# Patient Record
Sex: MALE | Race: WHITE | NOT HISPANIC OR LATINO
[De-identification: names, ages, dates, MRNs, and addresses within clinical notes are randomized per-mention and may not be internally consistent; named-entity substitution may affect disease eponyms.]

---

## 2019-12-05 ENCOUNTER — TRANSCRIPTION ENCOUNTER (OUTPATIENT)
Age: 74
End: 2019-12-05

## 2019-12-06 ENCOUNTER — INPATIENT (INPATIENT)
Facility: HOSPITAL | Age: 74
LOS: 82 days | Discharge: HOME | DRG: 853 | End: 2020-02-27
Attending: SURGERY | Admitting: SURGERY
Payer: MEDICARE

## 2019-12-06 VITALS
SYSTOLIC BLOOD PRESSURE: 75 MMHG | OXYGEN SATURATION: 96 % | TEMPERATURE: 98 F | DIASTOLIC BLOOD PRESSURE: 57 MMHG | HEART RATE: 116 BPM | RESPIRATION RATE: 32 BRPM

## 2019-12-06 DIAGNOSIS — K92.2 GASTROINTESTINAL HEMORRHAGE, UNSPECIFIED: ICD-10-CM

## 2019-12-06 DIAGNOSIS — Z98.890 OTHER SPECIFIED POSTPROCEDURAL STATES: Chronic | ICD-10-CM

## 2019-12-06 DIAGNOSIS — Z90.49 ACQUIRED ABSENCE OF OTHER SPECIFIED PARTS OF DIGESTIVE TRACT: Chronic | ICD-10-CM

## 2019-12-06 LAB
ALBUMIN SERPL ELPH-MCNC: 4.8 G/DL — SIGNIFICANT CHANGE UP (ref 3.3–5)
ALP SERPL-CCNC: 98 U/L — SIGNIFICANT CHANGE UP (ref 40–120)
ALT FLD-CCNC: 16 U/L — SIGNIFICANT CHANGE UP (ref 10–45)
ANION GAP SERPL CALC-SCNC: 18 MMOL/L — HIGH (ref 5–17)
ANION GAP SERPL CALC-SCNC: 33 MMOL/L — HIGH (ref 5–17)
APTT BLD: 30 SEC — SIGNIFICANT CHANGE UP (ref 27.5–36.3)
APTT BLD: 30.5 SEC — SIGNIFICANT CHANGE UP (ref 27.5–36.3)
AST SERPL-CCNC: 27 U/L — SIGNIFICANT CHANGE UP (ref 10–40)
BASE EXCESS BLDV CALC-SCNC: 0.4 MMOL/L — SIGNIFICANT CHANGE UP (ref -2–2)
BASOPHILS # BLD AUTO: 0.03 K/UL — SIGNIFICANT CHANGE UP (ref 0–0.2)
BASOPHILS NFR BLD AUTO: 0.1 % — SIGNIFICANT CHANGE UP (ref 0–2)
BILIRUB SERPL-MCNC: 0.6 MG/DL — SIGNIFICANT CHANGE UP (ref 0.2–1.2)
BLD GP AB SCN SERPL QL: NEGATIVE — SIGNIFICANT CHANGE UP
BLD GP AB SCN SERPL QL: NEGATIVE — SIGNIFICANT CHANGE UP
BUN SERPL-MCNC: 37 MG/DL — HIGH (ref 7–23)
BUN SERPL-MCNC: 41 MG/DL — HIGH (ref 7–23)
CA-I SERPL-SCNC: 1.08 MMOL/L — LOW (ref 1.12–1.3)
CALCIUM SERPL-MCNC: 8.3 MG/DL — LOW (ref 8.4–10.5)
CALCIUM SERPL-MCNC: 9.5 MG/DL — SIGNIFICANT CHANGE UP (ref 8.4–10.5)
CHLORIDE BLDV-SCNC: 94 MMOL/L — LOW (ref 96–108)
CHLORIDE SERPL-SCNC: 101 MMOL/L — SIGNIFICANT CHANGE UP (ref 96–108)
CHLORIDE SERPL-SCNC: 87 MMOL/L — LOW (ref 96–108)
CO2 BLDV-SCNC: 31 MMOL/L — HIGH (ref 22–30)
CO2 SERPL-SCNC: 22 MMOL/L — SIGNIFICANT CHANGE UP (ref 22–31)
CO2 SERPL-SCNC: 22 MMOL/L — SIGNIFICANT CHANGE UP (ref 22–31)
CREAT SERPL-MCNC: 2.36 MG/DL — HIGH (ref 0.5–1.3)
CREAT SERPL-MCNC: 2.74 MG/DL — HIGH (ref 0.5–1.3)
EOSINOPHIL # BLD AUTO: 0.01 K/UL — SIGNIFICANT CHANGE UP (ref 0–0.5)
EOSINOPHIL NFR BLD AUTO: 0 % — SIGNIFICANT CHANGE UP (ref 0–6)
GAS PNL BLDA: SIGNIFICANT CHANGE UP
GAS PNL BLDA: SIGNIFICANT CHANGE UP
GAS PNL BLDV: 135 MMOL/L — SIGNIFICANT CHANGE UP (ref 135–145)
GAS PNL BLDV: SIGNIFICANT CHANGE UP
GAS PNL BLDV: SIGNIFICANT CHANGE UP
GLUCOSE BLDV-MCNC: 208 MG/DL — HIGH (ref 70–99)
GLUCOSE SERPL-MCNC: 198 MG/DL — HIGH (ref 70–99)
GLUCOSE SERPL-MCNC: 75 MG/DL — SIGNIFICANT CHANGE UP (ref 70–99)
HCO3 BLDV-SCNC: 29 MMOL/L — SIGNIFICANT CHANGE UP (ref 21–29)
HCT VFR BLD CALC: 44.5 % — SIGNIFICANT CHANGE UP (ref 39–50)
HCT VFR BLD CALC: 46.2 % — SIGNIFICANT CHANGE UP (ref 39–50)
HCT VFR BLDA CALC: 50 % — SIGNIFICANT CHANGE UP (ref 39–50)
HGB BLD CALC-MCNC: 16.5 G/DL — SIGNIFICANT CHANGE UP (ref 13–17)
HGB BLD-MCNC: 15.1 G/DL — SIGNIFICANT CHANGE UP (ref 13–17)
HGB BLD-MCNC: 15.9 G/DL — SIGNIFICANT CHANGE UP (ref 13–17)
IMM GRANULOCYTES NFR BLD AUTO: 0.8 % — SIGNIFICANT CHANGE UP (ref 0–1.5)
INR BLD: 1.07 RATIO — SIGNIFICANT CHANGE UP (ref 0.88–1.16)
INR BLD: 1.21 RATIO — HIGH (ref 0.88–1.16)
LACTATE BLDV-MCNC: 9.4 MMOL/L — CRITICAL HIGH (ref 0.7–2)
LYMPHOCYTES # BLD AUTO: 1.32 K/UL — SIGNIFICANT CHANGE UP (ref 1–3.3)
LYMPHOCYTES # BLD AUTO: 5.7 % — LOW (ref 13–44)
MAGNESIUM SERPL-MCNC: 1.9 MG/DL — SIGNIFICANT CHANGE UP (ref 1.6–2.6)
MANUAL SMEAR VERIFICATION: SIGNIFICANT CHANGE UP
MCHC RBC-ENTMCNC: 30.3 PG — SIGNIFICANT CHANGE UP (ref 27–34)
MCHC RBC-ENTMCNC: 30.7 PG — SIGNIFICANT CHANGE UP (ref 27–34)
MCHC RBC-ENTMCNC: 33.9 GM/DL — SIGNIFICANT CHANGE UP (ref 32–36)
MCHC RBC-ENTMCNC: 34.4 GM/DL — SIGNIFICANT CHANGE UP (ref 32–36)
MCV RBC AUTO: 89.2 FL — SIGNIFICANT CHANGE UP (ref 80–100)
MCV RBC AUTO: 89.2 FL — SIGNIFICANT CHANGE UP (ref 80–100)
MONOCYTES # BLD AUTO: 1.06 K/UL — HIGH (ref 0–0.9)
MONOCYTES NFR BLD AUTO: 4.6 % — SIGNIFICANT CHANGE UP (ref 2–14)
NEUTROPHILS # BLD AUTO: 20.67 K/UL — HIGH (ref 1.8–7.4)
NEUTROPHILS NFR BLD AUTO: 88.8 % — HIGH (ref 43–77)
NRBC # BLD: 0 /100 WBCS — SIGNIFICANT CHANGE UP (ref 0–0)
NRBC # BLD: 0 /100 WBCS — SIGNIFICANT CHANGE UP (ref 0–0)
PCO2 BLDV: 67 MMHG — HIGH (ref 35–50)
PH BLDV: 7.27 — LOW (ref 7.35–7.45)
PHOSPHATE SERPL-MCNC: 6.2 MG/DL — HIGH (ref 2.5–4.5)
PLAT MORPH BLD: NORMAL — SIGNIFICANT CHANGE UP
PLATELET # BLD AUTO: 230 K/UL — SIGNIFICANT CHANGE UP (ref 150–400)
PLATELET # BLD AUTO: 352 K/UL — SIGNIFICANT CHANGE UP (ref 150–400)
PO2 BLDV: <20 MMHG — LOW (ref 25–45)
POTASSIUM BLDV-SCNC: 4.4 MMOL/L — SIGNIFICANT CHANGE UP (ref 3.5–5.3)
POTASSIUM SERPL-MCNC: 3.4 MMOL/L — LOW (ref 3.5–5.3)
POTASSIUM SERPL-MCNC: 3.5 MMOL/L — SIGNIFICANT CHANGE UP (ref 3.5–5.3)
POTASSIUM SERPL-SCNC: 3.4 MMOL/L — LOW (ref 3.5–5.3)
POTASSIUM SERPL-SCNC: 3.5 MMOL/L — SIGNIFICANT CHANGE UP (ref 3.5–5.3)
PROT SERPL-MCNC: 8.5 G/DL — HIGH (ref 6–8.3)
PROTHROM AB SERPL-ACNC: 12.3 SEC — SIGNIFICANT CHANGE UP (ref 10–12.9)
PROTHROM AB SERPL-ACNC: 14 SEC — HIGH (ref 10–12.9)
RBC # BLD: 4.99 M/UL — SIGNIFICANT CHANGE UP (ref 4.2–5.8)
RBC # BLD: 5.18 M/UL — SIGNIFICANT CHANGE UP (ref 4.2–5.8)
RBC # FLD: 12.5 % — SIGNIFICANT CHANGE UP (ref 10.3–14.5)
RBC # FLD: 12.9 % — SIGNIFICANT CHANGE UP (ref 10.3–14.5)
RBC BLD AUTO: NORMAL — SIGNIFICANT CHANGE UP
RH IG SCN BLD-IMP: POSITIVE — SIGNIFICANT CHANGE UP
RH IG SCN BLD-IMP: POSITIVE — SIGNIFICANT CHANGE UP
SAO2 % BLDV: 13 % — LOW (ref 67–88)
SODIUM SERPL-SCNC: 141 MMOL/L — SIGNIFICANT CHANGE UP (ref 135–145)
SODIUM SERPL-SCNC: 142 MMOL/L — SIGNIFICANT CHANGE UP (ref 135–145)
WBC # BLD: 14.16 K/UL — HIGH (ref 3.8–10.5)
WBC # BLD: 23.28 K/UL — HIGH (ref 3.8–10.5)
WBC # FLD AUTO: 14.16 K/UL — HIGH (ref 3.8–10.5)
WBC # FLD AUTO: 23.28 K/UL — HIGH (ref 3.8–10.5)

## 2019-12-06 PROCEDURE — 99291 CRITICAL CARE FIRST HOUR: CPT | Mod: GC

## 2019-12-06 PROCEDURE — 99292 CRITICAL CARE ADDL 30 MIN: CPT | Mod: GC

## 2019-12-06 PROCEDURE — 71045 X-RAY EXAM CHEST 1 VIEW: CPT | Mod: 26,76

## 2019-12-06 PROCEDURE — 99291 CRITICAL CARE FIRST HOUR: CPT

## 2019-12-06 PROCEDURE — 74176 CT ABD & PELVIS W/O CONTRAST: CPT | Mod: 26

## 2019-12-06 PROCEDURE — 99223 1ST HOSP IP/OBS HIGH 75: CPT | Mod: GC

## 2019-12-06 PROCEDURE — 74018 RADEX ABDOMEN 1 VIEW: CPT | Mod: 26

## 2019-12-06 RX ORDER — POTASSIUM CHLORIDE 20 MEQ
20 PACKET (EA) ORAL
Refills: 0 | Status: COMPLETED | OUTPATIENT
Start: 2019-12-06 | End: 2019-12-07

## 2019-12-06 RX ORDER — HEPARIN SODIUM 5000 [USP'U]/ML
5000 INJECTION INTRAVENOUS; SUBCUTANEOUS EVERY 8 HOURS
Refills: 0 | Status: DISCONTINUED | OUTPATIENT
Start: 2019-12-06 | End: 2019-12-06

## 2019-12-06 RX ORDER — CALCIUM GLUCONATE 100 MG/ML
2 VIAL (ML) INTRAVENOUS ONCE
Refills: 0 | Status: DISCONTINUED | OUTPATIENT
Start: 2019-12-06 | End: 2019-12-06

## 2019-12-06 RX ORDER — CHLORHEXIDINE GLUCONATE 213 G/1000ML
1 SOLUTION TOPICAL
Refills: 0 | Status: DISCONTINUED | OUTPATIENT
Start: 2019-12-06 | End: 2019-12-08

## 2019-12-06 RX ORDER — MAGNESIUM SULFATE 500 MG/ML
2 VIAL (ML) INJECTION ONCE
Refills: 0 | Status: COMPLETED | OUTPATIENT
Start: 2019-12-06 | End: 2019-12-06

## 2019-12-06 RX ORDER — SODIUM CHLORIDE 9 MG/ML
1000 INJECTION INTRAMUSCULAR; INTRAVENOUS; SUBCUTANEOUS
Refills: 0 | Status: DISCONTINUED | OUTPATIENT
Start: 2019-12-06 | End: 2019-12-06

## 2019-12-06 RX ORDER — ONDANSETRON 8 MG/1
4 TABLET, FILM COATED ORAL ONCE
Refills: 0 | Status: COMPLETED | OUTPATIENT
Start: 2019-12-06 | End: 2019-12-06

## 2019-12-06 RX ORDER — VASOPRESSIN 20 [USP'U]/ML
0.03 INJECTION INTRAVENOUS
Qty: 50 | Refills: 0 | Status: DISCONTINUED | OUTPATIENT
Start: 2019-12-06 | End: 2019-12-08

## 2019-12-06 RX ORDER — CHLORHEXIDINE GLUCONATE 213 G/1000ML
15 SOLUTION TOPICAL
Refills: 0 | Status: DISCONTINUED | OUTPATIENT
Start: 2019-12-06 | End: 2019-12-08

## 2019-12-06 RX ORDER — SODIUM CHLORIDE 9 MG/ML
1000 INJECTION INTRAMUSCULAR; INTRAVENOUS; SUBCUTANEOUS ONCE
Refills: 0 | Status: DISCONTINUED | OUTPATIENT
Start: 2019-12-06 | End: 2019-12-06

## 2019-12-06 RX ORDER — IPRATROPIUM/ALBUTEROL SULFATE 18-103MCG
3 AEROSOL WITH ADAPTER (GRAM) INHALATION EVERY 6 HOURS
Refills: 0 | Status: DISCONTINUED | OUTPATIENT
Start: 2019-12-06 | End: 2019-12-08

## 2019-12-06 RX ORDER — PIPERACILLIN AND TAZOBACTAM 4; .5 G/20ML; G/20ML
3.38 INJECTION, POWDER, LYOPHILIZED, FOR SOLUTION INTRAVENOUS EVERY 12 HOURS
Refills: 0 | Status: DISCONTINUED | OUTPATIENT
Start: 2019-12-07 | End: 2019-12-08

## 2019-12-06 RX ORDER — SODIUM CHLORIDE 9 MG/ML
1000 INJECTION, SOLUTION INTRAVENOUS
Refills: 0 | Status: DISCONTINUED | OUTPATIENT
Start: 2019-12-06 | End: 2019-12-06

## 2019-12-06 RX ORDER — SODIUM CHLORIDE 9 MG/ML
1000 INJECTION INTRAMUSCULAR; INTRAVENOUS; SUBCUTANEOUS ONCE
Refills: 0 | Status: COMPLETED | OUTPATIENT
Start: 2019-12-06 | End: 2019-12-06

## 2019-12-06 RX ORDER — SODIUM CHLORIDE 9 MG/ML
500 INJECTION INTRAMUSCULAR; INTRAVENOUS; SUBCUTANEOUS ONCE
Refills: 0 | Status: DISCONTINUED | OUTPATIENT
Start: 2019-12-06 | End: 2019-12-06

## 2019-12-06 RX ORDER — CEFTRIAXONE 500 MG/1
2000 INJECTION, POWDER, FOR SOLUTION INTRAMUSCULAR; INTRAVENOUS ONCE
Refills: 0 | Status: COMPLETED | OUTPATIENT
Start: 2019-12-06 | End: 2019-12-06

## 2019-12-06 RX ORDER — FENTANYL CITRATE 50 UG/ML
1 INJECTION INTRAVENOUS
Qty: 5000 | Refills: 0 | Status: DISCONTINUED | OUTPATIENT
Start: 2019-12-06 | End: 2019-12-08

## 2019-12-06 RX ORDER — OCTREOTIDE ACETATE 200 UG/ML
50 INJECTION, SOLUTION INTRAVENOUS; SUBCUTANEOUS
Qty: 500 | Refills: 0 | Status: DISCONTINUED | OUTPATIENT
Start: 2019-12-06 | End: 2019-12-06

## 2019-12-06 RX ORDER — SODIUM CHLORIDE 9 MG/ML
1000 INJECTION, SOLUTION INTRAVENOUS
Refills: 0 | Status: DISCONTINUED | OUTPATIENT
Start: 2019-12-06 | End: 2019-12-07

## 2019-12-06 RX ORDER — SODIUM CHLORIDE 9 MG/ML
1000 INJECTION, SOLUTION INTRAVENOUS ONCE
Refills: 0 | Status: COMPLETED | OUTPATIENT
Start: 2019-12-06 | End: 2019-12-06

## 2019-12-06 RX ORDER — PANTOPRAZOLE SODIUM 20 MG/1
40 TABLET, DELAYED RELEASE ORAL EVERY 12 HOURS
Refills: 0 | Status: DISCONTINUED | OUTPATIENT
Start: 2019-12-06 | End: 2019-12-08

## 2019-12-06 RX ORDER — DEXMEDETOMIDINE HYDROCHLORIDE IN 0.9% SODIUM CHLORIDE 4 UG/ML
0.2 INJECTION INTRAVENOUS
Qty: 200 | Refills: 0 | Status: DISCONTINUED | OUTPATIENT
Start: 2019-12-06 | End: 2019-12-08

## 2019-12-06 RX ORDER — METOCLOPRAMIDE HCL 10 MG
10 TABLET ORAL ONCE
Refills: 0 | Status: COMPLETED | OUTPATIENT
Start: 2019-12-06 | End: 2019-12-06

## 2019-12-06 RX ORDER — PANTOPRAZOLE SODIUM 20 MG/1
40 TABLET, DELAYED RELEASE ORAL EVERY 24 HOURS
Refills: 0 | Status: DISCONTINUED | OUTPATIENT
Start: 2019-12-06 | End: 2019-12-06

## 2019-12-06 RX ORDER — SODIUM CHLORIDE 9 MG/ML
1500 INJECTION INTRAMUSCULAR; INTRAVENOUS; SUBCUTANEOUS ONCE
Refills: 0 | Status: DISCONTINUED | OUTPATIENT
Start: 2019-12-06 | End: 2019-12-06

## 2019-12-06 RX ORDER — PANTOPRAZOLE SODIUM 20 MG/1
80 TABLET, DELAYED RELEASE ORAL ONCE
Refills: 0 | Status: COMPLETED | OUTPATIENT
Start: 2019-12-06 | End: 2019-12-06

## 2019-12-06 RX ORDER — OCTREOTIDE ACETATE 200 UG/ML
50 INJECTION, SOLUTION INTRAVENOUS; SUBCUTANEOUS ONCE
Refills: 0 | Status: COMPLETED | OUTPATIENT
Start: 2019-12-06 | End: 2019-12-06

## 2019-12-06 RX ORDER — PANTOPRAZOLE SODIUM 20 MG/1
8 TABLET, DELAYED RELEASE ORAL
Qty: 80 | Refills: 0 | Status: DISCONTINUED | OUTPATIENT
Start: 2019-12-06 | End: 2019-12-06

## 2019-12-06 RX ORDER — PIPERACILLIN AND TAZOBACTAM 4; .5 G/20ML; G/20ML
3.38 INJECTION, POWDER, LYOPHILIZED, FOR SOLUTION INTRAVENOUS ONCE
Refills: 0 | Status: COMPLETED | OUTPATIENT
Start: 2019-12-06 | End: 2019-12-06

## 2019-12-06 RX ORDER — ENOXAPARIN SODIUM 100 MG/ML
30 INJECTION SUBCUTANEOUS EVERY 24 HOURS
Refills: 0 | Status: DISCONTINUED | OUTPATIENT
Start: 2019-12-06 | End: 2019-12-08

## 2019-12-06 RX ADMIN — Medication 50 MILLIEQUIVALENT(S): at 23:50

## 2019-12-06 RX ADMIN — OCTREOTIDE ACETATE 50 MICROGRAM(S): 200 INJECTION, SOLUTION INTRAVENOUS; SUBCUTANEOUS at 16:27

## 2019-12-06 RX ADMIN — SODIUM CHLORIDE 1000 MILLILITER(S): 9 INJECTION INTRAMUSCULAR; INTRAVENOUS; SUBCUTANEOUS at 16:58

## 2019-12-06 RX ADMIN — ONDANSETRON 4 MILLIGRAM(S): 8 TABLET, FILM COATED ORAL at 15:39

## 2019-12-06 RX ADMIN — Medication 10 MILLIGRAM(S): at 18:03

## 2019-12-06 RX ADMIN — DEXMEDETOMIDINE HYDROCHLORIDE IN 0.9% SODIUM CHLORIDE 2.71 MICROGRAM(S)/KG/HR: 4 INJECTION INTRAVENOUS at 23:23

## 2019-12-06 RX ADMIN — PIPERACILLIN AND TAZOBACTAM 200 GRAM(S): 4; .5 INJECTION, POWDER, LYOPHILIZED, FOR SOLUTION INTRAVENOUS at 18:08

## 2019-12-06 RX ADMIN — FENTANYL CITRATE 2.71 MICROGRAM(S)/KG/HR: 50 INJECTION INTRAVENOUS at 23:21

## 2019-12-06 RX ADMIN — CEFTRIAXONE 100 MILLIGRAM(S): 500 INJECTION, POWDER, FOR SOLUTION INTRAMUSCULAR; INTRAVENOUS at 16:26

## 2019-12-06 RX ADMIN — SODIUM CHLORIDE 4000 MILLILITER(S): 9 INJECTION INTRAMUSCULAR; INTRAVENOUS; SUBCUTANEOUS at 23:22

## 2019-12-06 RX ADMIN — PANTOPRAZOLE SODIUM 10 MG/HR: 20 TABLET, DELAYED RELEASE ORAL at 16:55

## 2019-12-06 RX ADMIN — SODIUM CHLORIDE 2000 MILLILITER(S): 9 INJECTION, SOLUTION INTRAVENOUS at 17:56

## 2019-12-06 RX ADMIN — VASOPRESSIN 1.8 UNIT(S)/MIN: 20 INJECTION INTRAVENOUS at 23:23

## 2019-12-06 RX ADMIN — PANTOPRAZOLE SODIUM 80 MILLIGRAM(S): 20 TABLET, DELAYED RELEASE ORAL at 15:38

## 2019-12-06 RX ADMIN — Medication 50 GRAM(S): at 23:49

## 2019-12-06 RX ADMIN — OCTREOTIDE ACETATE 10 MICROGRAM(S)/HR: 200 INJECTION, SOLUTION INTRAVENOUS; SUBCUTANEOUS at 16:58

## 2019-12-06 RX ADMIN — ONDANSETRON 4 MILLIGRAM(S): 8 TABLET, FILM COATED ORAL at 15:47

## 2019-12-06 NOTE — CONSULT NOTE ADULT - ASSESSMENT
75 y/o M presenting with septic shock and high grade SBO s/p exploratory laparotomy, lysis of adhesions, decompression of bowel via enterotomy w/ primary repair, and Abthera VAC placement.    PLAN:    Neuro:    Resp:    CV:    GI:    Renal:    Heme:    ID:    Endo:    Disposition:  - Full code  - [facility]    Janelle Bah PA-C     [id number] 73 y/o M presenting with septic shock and high grade SBO s/p exploratory laparotomy, lysis of adhesions, decompression of bowel via enterotomy w/ primary repair, and Abthera VAC placement on 12/6    PLAN:    Neuro: acute pain  - Precedex for sedation while intubated  - Fentanyl drip for analgesia    Resp: acute respiratory failure  - Continue mechanical ventilatory support  - Trend ABG and lactate q4 hours  - Daily CXR while intubated  - Duonebs    CV: septic shock requiring vasopressor support  - Wean Levophed as tolerated for MAP >65  - Vasopressin at 0.03 units/min  - Naeem Trac, CVP, trend lactate  - Critical care echocardiography and fluid resuscitation    GI: SBO s/p exploratory laparotomy, lysis of adhesions, decompression of bowel via enterotomy w/ primary repair, and Abthera VAC placement  - NPO/ NGT  - Protonix for stress ulcer prophylaxis    Renal: CHI   - NS @ 75 ml/hr  - Martinez, strict I&Os    Heme: no acute issues  - Lovenox for VTE prophylaxis  - Venodynes    ID: septic shock 2/2 intra abdominal infection  - Continue Zosyn    Endo: no acute issues  - Monitor glucose on VBMP    Disposition:  - Full code  - Will admit to SICU    Janelle Bah PA-C     [id number]

## 2019-12-06 NOTE — ED ADULT NURSE NOTE - NSIMPLEMENTINTERV_GEN_ALL_ED
Implemented All Fall with Harm Risk Interventions:  Sells to call system. Call bell, personal items and telephone within reach. Instruct patient to call for assistance. Room bathroom lighting operational. Non-slip footwear when patient is off stretcher. Physically safe environment: no spills, clutter or unnecessary equipment. Stretcher in lowest position, wheels locked, appropriate side rails in place. Provide visual cue, wrist band, yellow gown, etc. Monitor gait and stability. Monitor for mental status changes and reorient to person, place, and time. Review medications for side effects contributing to fall risk. Reinforce activity limits and safety measures with patient and family. Provide visual clues: red socks.

## 2019-12-06 NOTE — H&P ADULT - ASSESSMENT
75 y/o with PMHx of COPD and ETOH abuse presenting with high grade SBO and peritoneal signs    - admit to Dr. TING siegel   - f/u  CXR for NGT placement   - NPO  -monitor NGT output  - IVF  - OR for emergent ex. lap     patient seen and examined with attending Dr. Siegel     [facility] [phone]

## 2019-12-06 NOTE — ED PROVIDER NOTE - ATTENDING CONTRIBUTION TO CARE
I, Estrada Martinez, performed a history and physical exam of the patient and discussed their management with the resident and /or advanced care provider. I reviewed the resident and /or ACP's note and agree with the documented findings and plan of care. I was present and available for all procedures.  Patient with upper GI bleed and tense abdomen.  GI called immediately along with 2 units pRBC considering hypotension and recent GI bleed.  Will also evaluate CT and discuss with surgery considering abdominal exam.  Patient improved in ED for CT evaluation.  Patient signed-out to Dr. Aguirre to f/u surgery consult, CT, and GI recommendations.

## 2019-12-06 NOTE — CONSULT NOTE ADULT - ASSESSMENT
Impression:  # Abdominal pain/vomiting: Concern for perforated abdomen. Pt clinically hypotensive, septic, lactate of 10 and without BM for 3 days. Pt possible having vomiting with kartik parikh tear vs perforation as well. Currently appears resuscitated but concern for sepsis given leukocytosis and clinical findings.  # COPD  # Prior alcohol use: liver enzymes wnl, platelets wnl, not concerning for cirrhosis or varices    Recommendation:  - check state CT Abdomen/pelvis  - surgical consult stat for possible OR given clinical findings  - monitor CBC, CMP, INR  - trend lactate  - can trial PPI IV BID  - no role for endoscopy given high risk with concern for perforation and EGD would be contraindicated as it can worsen perforation  - supportive care, consider ICU consult Impression:  # Abdominal pain/vomiting: Concern for intestinal obstruction vs perforated abdomen. Pt clinically hypotensive, septic, lactate of 10 and without BM for 3 days. Pt possible having vomiting with kartik parikh tear vs perforation as well. Currently appears resuscitated but concern for sepsis given leukocytosis and clinical findings.  # COPD  # Prior alcohol use: liver enzymes wnl, platelets wnl, not concerning for cirrhosis or varices    Recommendation:  - check state CT Abdomen/pelvis  - Place NGT to suction  - surgical consult stat for possible OR given clinical findings  - Would start board spectrum antibiotics pending imaging  - monitor CBC, CMP, INR  - trend lactate  - can trial PPI IV BID  - no role for endoscopy given high risk with concern for perforation and EGD would be contraindicated as it can worsen perforation  - supportive care, consider ICU consult

## 2019-12-06 NOTE — ED PROVIDER NOTE - CLINICAL SUMMARY MEDICAL DECISION MAKING FREE TEXT BOX
73yo M h/o COPD, former alcoholic (has not seen doctor in years) p/w coffee ground emesis since yesterday. ill appearing on exam, tachy, tachypneic, hypoxic 2/2 likely variceal bleed given pt's h/o former alcohol abuse. will give emergent blood, ceftriaxone, protonix, labs, GI consult, admission. 75yo M h/o COPD, former alcoholic (has not seen doctor in years) p/w coffee ground emesis since yesterday. ill appearing on exam, tachy, tachypneic, hypoxic 2/2 likely variceal bleed given pt's h/o former alcohol abuse. pt with rigid abdomen, concern for acute abdomen. will c/s surgery. will give emergent blood, ceftriaxone, protonix, labs, GI consult, admission.

## 2019-12-06 NOTE — CONSULT NOTE ADULT - SUBJECTIVE AND OBJECTIVE BOX
HISTORY OF PRESENT ILLNESS:  74 year old male with a past medical history of COPD and EtOH dependence who presented today with abdominal pain, N/V, and poor PO intake for ~2-3 days. Imaging revealed high grade SBO in the RLQ. Labs significant for an CHI w/ Cr 2.74 and lactate of 9.4. He was also notably tachycardic to the 110s and hypotensive w/ SBP in the 70s. He was given 2 L of LR in the ED. Patient was taken to the OR emergently for an exploratory laparotomy HISTORY OF PRESENT ILLNESS:  74 year old male with a past medical history of COPD and EtOH dependence who presented today with abdominal pain, nausea, hematemesis, and poor PO intake for ~2-3 days. Labs significant for an CHI w/ Cr 2.74 and lactate of 9.4. He was also notably tachycardic to the 110s and hypotensive w/ SBP in the 70s. He was given 2 units of PRBCs and 2 L of LR in the ED due to concern for upper GI bleeding. Imaging revealed high grade SBO in the RLQ. Patient was taken to the OR emergently for an exploratory laparotomy, lysis of adhesions, decompression of bowel via enterotomy w/ primary repair, and Abthera VAC placement. Of note, the distal 50% of the bowel appeared dusky but viable. He required vasopressor support with phenylephrine and vasopressin infusions. He received 3000 mL of crystalloid w/ EBL of 10 mL and UOP of 25 mL. Patient was left intubated at the end of the case so SICU was consulted for hemodynamic monitoring.    PAST MEDICAL HISTORY:  - COPD with hypoxia  - ETOH abuse    PAST SURGICAL HISTORY:  - History of lumbosacral spine surgery  - History of prostate surgery  - History of appendectomy    HOME MEDICATIONS: None    ALLERGIES: No Known Allergies    FAMILY HISTORY: Non-contributory    SOCIAL HISTORY: Former EtOH abuse w/ last drink ~3 years ago, current smoker of 1 PPD w/ 30 pack-years, denies illicit substance use    CODE STATUS: Full code    VITAL SIGNS:  T(C): 37.1 (07 Dec 2019 01:00), Max: 37.1 (07 Dec 2019 01:00)  T(F): 98.8 (07 Dec 2019 01:00), Max: 98.8 (07 Dec 2019 01:00)  HR: 104 (07 Dec 2019 02:15) (93 - 121)  BP: 82/54 (07 Dec 2019 00:15) (75/57 - 142/79)  BP(mean): 63 (07 Dec 2019 00:15) (63 - 104)  ABP: 100/55 (07 Dec 2019 02:15) (75/51 - 150/77)  ABP(mean): 71 (07 Dec 2019 02:15) (61 - 107)  RR: 20 (06 Dec 2019 22:30) (15 - 32)  SpO2: 100% (07 Dec 2019 02:15) (93% - 100%)    PHYSICAL EXAMINATION:  General - cachectic, no acute distress  Neuro - sedated, easily arousable, follows commands, no focal deficits  HEENT - normocephalic, PERRL, EOMI, moist mucous membranes  Lungs - clear to auscultation bilaterally  Heart - regular rhythm, tachycardic  Abdomen - soft, nondistended, sawyer-incisional tenderness, Abthera VAC to suction w/ good seal and moderate serosanguineous output   - Martinez to gravity draining clear & yellow urine  Extremities - all four extremities are warm & pink with 2+ pulses, strength 5/5, intact sensation    LABS:                        15.1   14.16 )-----------( 230      ( 06 Dec 2019 22:27 )             44.5     141  |  101  |  41<H>  ----------------------------<  75  3.4<L>   |  22  |  2.36<H>    Ca    8.3<L>      06 Dec 2019 22:27  Phos  6.2  Mg     1.9  TPro  8.5<H>  /  Alb  4.8  /  TBili  0.6  /  DBili  x   /  AST  27  /  ALT  16  /  AlkPhos  98    PT/INR - ( 06 Dec 2019 22:27 )   PT: 14.0 sec;   INR: 1.21 ratio    PTT - ( 06 Dec 2019 22:27 )  PTT:30.5 sec    ABG - ( 06 Dec 2019 22:14 )  pH, Arterial: 7.30    /  pCO2: 52    /  pO2: 334   / HCO3: 25    / Base Excess: -2.0  /  SaO2: 100   /   Lactate: 2.7    IMAGING:  < from: CT Abdomen and Pelvis No Cont (12.06.19 @ 16:48) >  FINDINGS:  - LOWER CHEST: Within normal limits.  - LIVER: Within normal limits.  - BILE DUCTS: Normal caliber.  - GALLBLADDER: Within normal limits.  - SPLEEN: Within normal limits.  - PANCREAS: Within normal limits.  - ADRENALS: Within normal limits.  - KIDNEYS/URETERS: No hydronephrosis. Nonobstructing bilateral renal calculi.  - BLADDER: Within normal limits.  - REPRODUCTIVE ORGANS: Prostatectomy.  - BOWEL: High-grade small bowel obstruction with transition point in the right lower abdomen (series 602, image 34).  - PERITONEUM: No ascites.  - VESSELS: Atherosclerotic changes.  - RETROPERITONEUM/LYMPH NODES: No lymphadenopathy.  - ABDOMINAL WALL: Within normal limits.  - BONES: Degenerative changes.  IMPRESSION:  - High-grade small bowel obstruction with transition point in the right lower quadrant. HISTORY OF PRESENT ILLNESS:  74 year old male with a past medical history of COPD and EtOH dependence who presented today with abdominal pain, nausea, hematemesis, and poor PO intake for ~2-3 days. Labs significant for an CHI w/ Cr 2.74 and lactate of 9.4. He was also notably tachycardic to the 110s and hypotensive w/ SBP in the 70s. He was given 2 units of PRBCs and 2 L of LR in the ED due to concern for upper GI bleeding. Imaging revealed high grade SBO in the RLQ. Patient was taken to the OR emergently for an exploratory laparotomy, lysis of adhesions, decompression of bowel via enterotomy w/ primary repair, and Abthera VAC placement. Of note, the distal 50% of the bowel appeared dusky but viable. He required vasopressor support with phenylephrine and vasopressin infusions. He received 3000 mL of crystalloid w/ EBL of 10 mL and UOP of 25 mL. Patient was left intubated at the end of the case so SICU was consulted for hemodynamic monitoring.    Of note, patient does not seem to have sought medical care in some time.    Unable to obtain HPI, history, or ROS from patient as he is sedated and intubated. History obtained from chart.    PAST MEDICAL HISTORY:  - COPD with hypoxia  - ETOH abuse (sober for 3 years)    PAST SURGICAL HISTORY:  - History of lumbosacral spine surgery  - History of prostate surgery  - History of appendectomy    HOME MEDICATIONS: None    ALLERGIES: No Known Allergies    FAMILY HISTORY: Non-contributory for small bowel obstruction    SOCIAL HISTORY: Former EtOH abuse w/ last drink ~3 years ago, current smoker of 1 PPD w/ 30 pack-years, denies illicit substance use    CODE STATUS: Full code    VITAL SIGNS:  T(C): 37.1 (07 Dec 2019 01:00), Max: 37.1 (07 Dec 2019 01:00)  T(F): 98.8 (07 Dec 2019 01:00), Max: 98.8 (07 Dec 2019 01:00)  HR: 104 (07 Dec 2019 02:15) (93 - 121)  BP: 82/54 (07 Dec 2019 00:15) (75/57 - 142/79)  BP(mean): 63 (07 Dec 2019 00:15) (63 - 104)  ABP: 100/55 (07 Dec 2019 02:15) (75/51 - 150/77)  ABP(mean): 71 (07 Dec 2019 02:15) (61 - 107)  RR: 20 (06 Dec 2019 22:30) (15 - 32)  SpO2: 100% (07 Dec 2019 02:15) (93% - 100%)    PHYSICAL EXAMINATION:  General - cachectic, no acute distress  Neuro - sedated, easily arousable, follows commands, no focal deficits  HEENT - normocephalic, PERRL, EOMI, moist mucous membranes  Lungs - clear to auscultation bilaterally  Heart - regular rhythm, tachycardic  Abdomen - soft, nondistended, sawyer-incisional tenderness, Abthera VAC to suction w/ good seal and moderate serosanguineous output   - Martinez to gravity draining clear & yellow urine  Extremities - all four extremities are warm & pink with 2+ pulses, strength 5/5, intact sensation    LABS:                        15.1   14.16 )-----------( 230      ( 06 Dec 2019 22:27 )             44.5     141  |  101  |  41<H>  ----------------------------<  75  3.4<L>   |  22  |  2.36<H>    Ca    8.3<L>      06 Dec 2019 22:27  Phos  6.2  Mg     1.9  TPro  8.5<H>  /  Alb  4.8  /  TBili  0.6  /  DBili  x   /  AST  27  /  ALT  16  /  AlkPhos  98    PT/INR - ( 06 Dec 2019 22:27 )   PT: 14.0 sec;   INR: 1.21 ratio    PTT - ( 06 Dec 2019 22:27 )  PTT:30.5 sec    ABG - ( 06 Dec 2019 22:14 )  pH, Arterial: 7.30    /  pCO2: 52    /  pO2: 334   / HCO3: 25    / Base Excess: -2.0  /  SaO2: 100   /   Lactate: 2.7    IMAGING:  < from: CT Abdomen and Pelvis No Cont (12.06.19 @ 16:48) >  FINDINGS:  - LOWER CHEST: Within normal limits.  - LIVER: Within normal limits.  - BILE DUCTS: Normal caliber.  - GALLBLADDER: Within normal limits.  - SPLEEN: Within normal limits.  - PANCREAS: Within normal limits.  - ADRENALS: Within normal limits.  - KIDNEYS/URETERS: No hydronephrosis. Nonobstructing bilateral renal calculi.  - BLADDER: Within normal limits.  - REPRODUCTIVE ORGANS: Prostatectomy.  - BOWEL: High-grade small bowel obstruction with transition point in the right lower abdomen (series 602, image 34).  - PERITONEUM: No ascites.  - VESSELS: Atherosclerotic changes.  - RETROPERITONEUM/LYMPH NODES: No lymphadenopathy.  - ABDOMINAL WALL: Within normal limits.  - BONES: Degenerative changes.  IMPRESSION:  - High-grade small bowel obstruction with transition point in the right lower quadrant.

## 2019-12-06 NOTE — CONSULT NOTE ADULT - ATTENDING COMMENTS
Patient seen and examined. Agree with above. Patient's abdomen is rigid and distended. With a high lactate and WBC, concerning for intestinal obstruction/perforation. Would obtain surgical evaluation. Start board spectrum antibiotics and start fluid resuscitation. There is no signs of active GI bleeding, would not pursue endoscopic evaluation at this time. Give PPI daily.

## 2019-12-06 NOTE — H&P ADULT - NSHPPHYSICALEXAM_GEN_ALL_CORE
Physical Exam  T(C): 36.6  HR: 121 (116 - 121)  BP: 121/78 (75/57 - 121/78)  RR: 29 (29 - 32)  SpO2: 100% (96% - 100%)  Tmax: T(C): , Max: 36.6 (12-06-19 @ 16:00)    General: well developed, well nourished, NAD  Neuro: alert and oriented, no focal deficits, moves all extremities spontaneously  HEENT: NCAT, EOMI, anicteric, mucosa moist  Respiratory: airway patent, respirations unlabored  CVS: regular rate and rhythm  Abdomen: rigid, diffusely TTP, distended, +guarding   Extremities: no edema, sensation and movement grossly intact  Skin: warm, dry, appropriate color

## 2019-12-06 NOTE — H&P ADULT - NSHPLABSRESULTS_GEN_ALL_CORE
Labs:                        15.9   23.28 )-----------( 352      ( 06 Dec 2019 15:29 )             46.2     PT/INR - ( 06 Dec 2019 16:46 )   PT: 12.3 sec;   INR: 1.07 ratio         PTT - ( 06 Dec 2019 16:46 )  PTT:30.0 sec  12-06    142  |  87<L>  |  37<H>  ----------------------------<  198<H>  3.5   |  22  |  2.74<H>    Ca    9.5      06 Dec 2019 15:29    TPro  8.5<H>  /  Alb  4.8  /  TBili  0.6  /  DBili  x   /  AST  27  /  ALT  16  /  AlkPhos  98  12-06            Imaging and other studies:  < from: CT Abdomen and Pelvis No Cont (12.06.19 @ 16:48) >      PROCEDURE:   CT of the Abdomen and Pelvis was performed without intravenous contrast.   Intravenous contrast: None.  Oral contrast: None.  Sagittal and coronal reformats were performed.    FINDINGS:    LOWER CHEST: Within normal limits.    LIVER: Within normal limits.  BILE DUCTS: Normal caliber.  GALLBLADDER: Within normal limits.  SPLEEN: Within normal limits.  PANCREAS: Within normal limits.  ADRENALS: Within normal limits.  KIDNEYS/URETERS: No hydronephrosis. Nonobstructing bilateral renal   calculi.    BLADDER: Within normal limits.  REPRODUCTIVE ORGANS: Prostatectomy.    BOWEL: High-grade small bowel obstruction with transition point in the   right lower abdomen (series 602, image 34).  PERITONEUM: No ascites.  VESSELS: Atherosclerotic changes.  RETROPERITONEUM/LYMPH NODES: No lymphadenopathy.  ABDOMINAL WALL: Within normal limits.  BONES: Degenerative changes.    IMPRESSION:     High-grade small bowel obstruction with transition point in the right   lower quadrant.

## 2019-12-06 NOTE — ED PROVIDER NOTE - NS ED ROS FT
Gen: +weak,  No fever  Eyes: No eye irritation or discharge  ENT: No earpain, congestion, sore throat  Resp: No cough or trouble breathing  Cardiovascular: No chest pain or palpitation  Gastroenteric: +coffee ground emesis  : No dysuria  MS: No joint or muscle pain  Skin: No rashes  Neuro: No headache  Remainder negative, except as per the HPI

## 2019-12-06 NOTE — ED PROVIDER NOTE - PHYSICAL EXAMINATION
Vitals: tachy, hypotensive, tachypneic  Gen: pale and weak appearing, frail  Head: NCAT  ENT: sclerae white, anicterus, dry mucous membranes. coffee ground emesis stained on mouth  CV: RRR. Audible S1 and S2.   Resp: poor respiratory effort  Abd: soft, normoactive BS x4, NTND, no rebound, no guarding, no rashes  Musculoskeletal:  No peripheral edema  Skin: no lesions or scars noted  Neurologic: AAOx3  : no CVA tenderness  Psych: normal affect Vitals: tachy, hypotensive, tachypneic  Gen: pale and weak appearing, frail  Head: NCAT  ENT: sclerae white, anicterus, dry mucous membranes. coffee ground emesis stained on mouth  CV: RRR. Audible S1 and S2.   Resp: poor respiratory effort  Abd: rigid abdomen, generalized ttp  Musculoskeletal:  No peripheral edema  Skin: no lesions or scars noted  Neurologic: AAOx3  : no CVA tenderness  Psych: normal affect

## 2019-12-06 NOTE — ED ADULT NURSE NOTE - OBJECTIVE STATEMENT
Pt BIBA from home c/o weakness since yesterday, 2 episodes of syncope and coffee ground emesis since today.  Pt with distant history of alcohol abuse (last drink 7 years ago), denies any hx of GIB, no blood thinners or NSAID use.  Pt is vomiting moderate amount of coffee ground emesis, hypotensive to 70s systolic and tachycardic to 120s. Pt BIBA from home c/o weakness since yesterday, 2 episodes of syncope and coffee ground emesis since today.  Pt with distant history of alcohol abuse (last drink 7 years ago), denies any hx of GIB, no blood thinners or NSAID use.  Pt is vomiting moderate amount of coffee ground emesis, hypotensive to 70s systolic and tachycardic to 120s, reports 2 episodes of syncope (near syncope?) today related to feeling weak and lightheaded, denies hitting his head, hx of COPD but feels that he is more SOB than usual, abd rigid, diffusely ttp, denies cp, black stool, fevers, urinary symptoms.  Was trying to drink water and ginger ale today but could not keep it down.  Current smoker.

## 2019-12-06 NOTE — CONSULT NOTE ADULT - ATTENDING COMMENTS
Pt seen and examined on admission to SICU from OR with residents and PA, agree with above. Chart reviewed, including old records from 2013 admission for EtOH detox, at which time his creatinine was 0.7.    1. High-grade adhesive SBO presenting with likely CHI (see below) and lactic acidosis, with hematemesis and abdominal pain, s/p ex lap, lysis of adhesion, decompression of very dilated small bowel through enterotomy, repair of enterotomy, with finding of distal 50% small bowel duskiness without necrosis or perforation:  - Labs reviewed  - CT report reviewed  - ABThera in place, plan to return to OR on Sunday  - D/w Chief Resident  - Will keep intubated, check CXR in AM  - CXR postop image personally reviewed: TLC, ETT, and NGT in place, lungs clear  - Monitor lactate until clearance  - If acutely decompensates, will need to return to OR sooner  - Check CVP, SVV    2. CHI versus CKD 3 (or combination):  - Last creatinine in [place] from 2013, was 0.7  - Unclear whether patient has since developed CKD or whether this is CHI, although given acute illness with lactic acidosis and hypotension, favor CHI. Unable to determine stage 1 or 2 since baseline Cr unknown.  - Will attempt to find out if patient has seen any other physicians in the past few years for clarification  - Avoid nephrotoxic medications  - Hydration  - Monitor creatinine    3. Respiratory acidosis:  - Minute ventilation increased by increasing respiratory rate  - Repeat ABG improved, maintain vent settings  - Daily CXR    Critical care time: 65 minutes Pt seen and examined on admission to SICU from OR with residents and PA, agree with above. Chart reviewed, including old records from 2013 admission for EtOH detox, at which time his creatinine was 0.7.    1. High-grade adhesive SBO presenting with likely HCI (see below) and lactic acidosis, with hematemesis and abdominal pain, s/p ex lap, lysis of adhesion, decompression of very dilated small bowel through enterotomy, repair of enterotomy, with finding of distal 50% small bowel duskiness without necrosis or perforation; on and off pressors (likely mixed distributive and hypovolemic shock):  - Labs reviewed  - CT report reviewed  - ABThera in place, plan to return to OR on Sunday  - D/w Chief Resident  - Will keep intubated, check CXR in AM  - CXR postop image personally reviewed: TLC, ETT, and NGT in place, lungs clear  - Monitor lactate until clearance  - If acutely decompensates, will need to return to OR sooner  - Check CVP, SVV    2. CHI versus CKD 3 (or combination):  - Last creatinine in [place] from 2013, was 0.7  - Unclear whether patient has since developed CKD or whether this is CHI, although given acute illness with lactic acidosis and hypotension, favor CHI. Unable to determine stage 1 or 2 since baseline Cr unknown.  - Will attempt to find out if patient has seen any other physicians in the past few years for clarification  - Avoid nephrotoxic medications  - Hydration  - Monitor creatinine    3. Respiratory acidosis:  - Minute ventilation increased by increasing respiratory rate  - Repeat ABG improved, maintain vent settings  - Daily CXR    Critical care time: 65 minutes

## 2019-12-06 NOTE — CONSULT NOTE ADULT - SUBJECTIVE AND OBJECTIVE BOX
Chief Complaint:  Patient is a 74y old  Male who presents with a chief complaint of     HPI:    Allergies:  No Known Allergies      Home Medications:    Hospital Medications:  cefTRIAXone   IVPB 2000 milliGRAM(s) IV Intermittent Once  octreotide  Infusion 50 MICROgram(s)/Hr IV Continuous <Continuous>  octreotide  Injectable 50 MICROGram(s) IV Push Once  pantoprazole Infusion 8 mG/Hr IV Continuous <Continuous>  sodium chloride 0.9% Bolus 500 milliLiter(s) IV Bolus once      PMHX/PSHX:  ETOHism  ETOH abuse      Family history:      Social History:     ROS:     General:  No wt loss, fevers, chills, night sweats, fatigue,   Eyes:  Good vision, no reported pain  ENT:  No sore throat, pain, runny nose, dysphagia  CV:  No pain, palpitations, hypo/hypertension  Resp:  No dyspnea, cough, tachypnea, wheezing  GI:  See HPI  :  No pain, bleeding, incontinence, nocturia  Muscle:  No pain, weakness  Neuro:  No weakness, tingling, memory problems  Psych:  No fatigue, insomnia, mood problems, depression  Endocrine:  No polyuria, polydipsia, cold/heat intolerance  Heme:  No petechiae, ecchymosis, easy bruisability  Skin:  No rash, edema      PHYSICAL EXAM:     GENERAL:  Appears stated age, well-groomed, well-nourished, no distress  HEENT:  NC/AT,  conjunctivae clear and pink,  no JVD  CHEST:  Full & symmetric excursion, no increased effort, breath sounds clear  HEART:  Regular rhythm, S1, S2, no murmur/rub/S3/S4, no abdominal bruit, no edema  ABDOMEN:  Soft, non-tender, non-distended, normoactive bowel sounds,  no masses ,  EXTREMITIES:  no cyanosis,clubbing or edema  SKIN:  No rash/erythema/ecchymoses/petechiae/wounds/abscess/warm/dry  NEURO:  Alert, oriented    Vital Signs:  Vital Signs Last 24 Hrs  T(C): --  T(F): --  HR: --  BP: --  BP(mean): --  RR: --  SpO2: --  Daily     Daily     LABS:                        15.9   23.28 )-----------( 352      ( 06 Dec 2019 15:29 )             46.2                     Imaging: Chief Complaint:  Patient is a 74y old  Male who presents with a chief complaint of     HPI: Pt is a 75yo M with PMH COPD (not on home O2) and prior alcoholism (quit years ago) presents with trouble breathing, vomiting, and abdominal pain. Symptoms began 3 days ago when he felt a gas bubble in his stomach and his stomach expanding and feeling hard. He states he tried drinking soda to help with the gas. He had his last BM wednesday which was brown. Denies melena or hematochezia at any time. States he was having increase abdominal stiffness and then began vomiting every 1-2x every hour since yesterday evening. Vomit became coffee ground in appearance. States he feels short of breath additionally. States in the past had issues with swallowing at time but never had symptoms like these. Denies fever or chills, chest pain, headaches.    Allergies:  No Known Allergies      Home Medications:    Hospital Medications:  cefTRIAXone   IVPB 2000 milliGRAM(s) IV Intermittent Once  octreotide  Infusion 50 MICROgram(s)/Hr IV Continuous <Continuous>  octreotide  Injectable 50 MICROGram(s) IV Push Once  pantoprazole Infusion 8 mG/Hr IV Continuous <Continuous>  sodium chloride 0.9% Bolus 500 milliLiter(s) IV Bolus once      PMHX/PSHX:  ETOHism  ETOH abuse      Family history:      Social History:     ROS:     General:  No wt loss, fevers, chills, night sweats, fatigue,   Eyes:  Good vision, no reported pain  ENT:  No sore throat, pain, runny nose, dysphagia  CV:  No pain, palpitations, hypo/hypertension  Resp:  No dyspnea, cough, tachypnea, wheezing  GI:  See HPI  :  No pain, bleeding, incontinence, nocturia  Muscle:  No pain, weakness  Neuro:  No weakness, tingling, memory problems  Psych:  No fatigue, insomnia, mood problems, depression  Endocrine:  No polyuria, polydipsia, cold/heat intolerance  Heme:  No petechiae, ecchymosis, easy bruisability  Skin:  No rash, edema      PHYSICAL EXAM:     GENERAL:  Appears stated age, tachypneic uncomfortable but speaking in full sentences  HEENT:  NC/AT,  conjunctivae clear and pink,  no JVD  CHEST:  tachypneic  HEART: tachycardic  ABDOMEN:  rigid, TTP throughout, distended, hypoactive bowel sounds  EXTREMITIES:  no cyanosis,clubbing or edema  SKIN:  No rash/erythema/ecchymoses/petechiae/wounds/abscess/warm/dry  NEURO:  Alert, oriented x3    Vital Signs:  Vital Signs Last 24 Hrs  T(C): --  T(F): --  HR: --  BP: --  BP(mean): --  RR: --  SpO2: --  Daily     Daily     LABS:                        15.9   23.28 )-----------( 352      ( 06 Dec 2019 15:29 )             46.2                     Imaging: Chief Complaint:  Patient is a 74y old  Male who presents with a chief complaint of     HPI: Pt is a 75yo M with PMH COPD (not on home O2) and prior alcoholism (quit years ago) presents with trouble breathing, vomiting, and abdominal pain. Symptoms began 3 days ago when he felt a gas bubble in his stomach and his stomach expanding and feeling hard. He states he tried drinking soda to help with the gas. He had his last BM wednesday which was brown. He reports continuous diffuse pain that is preventing him from sleeping. Denies melena or hematochezia at any time. States he was having increase abdominal stiffness and then began vomiting every 1-2x every hour since yesterday evening. Vomit became coffee ground in appearance. States he feels short of breath additionally. States in the past had issues with swallowing at time but never had symptoms like these. Denies fever or chills, chest pain, headaches. He denies taking NSAIDs.     Allergies:  No Known Allergies      Home Medications:    Hospital Medications:  cefTRIAXone   IVPB 2000 milliGRAM(s) IV Intermittent Once  octreotide  Infusion 50 MICROgram(s)/Hr IV Continuous <Continuous>  octreotide  Injectable 50 MICROGram(s) IV Push Once  pantoprazole Infusion 8 mG/Hr IV Continuous <Continuous>  sodium chloride 0.9% Bolus 500 milliLiter(s) IV Bolus once      PMHX/PSHX:  ETOHism  Prostate CA  Previous ETOH abuse  COPD      Family history:  No IBD or colon cancer in family    Social History: Previous alcoholic, has not drank in a few years; no illicit drugs or smoking    ROS:     General:  No wt loss, fevers, chills, night sweats, fatigue,   Eyes:  Good vision, no reported pain  ENT:  No sore throat, pain, runny nose, dysphagia  CV:  No pain, palpitations, hypo/hypertension  Resp:  No dyspnea, cough, tachypnea, wheezing  GI:  See HPI  :  No pain, bleeding, incontinence, nocturia  Muscle:  No pain, weakness  Neuro:  No weakness, tingling, memory problems  Psych:  No fatigue, insomnia, mood problems, depression  Endocrine:  No polyuria, polydipsia, cold/heat intolerance  Heme:  No petechiae, ecchymosis, easy bruisability  Skin:  No rash, edema      PHYSICAL EXAM:     GENERAL:  Appears stated age, tachypneic uncomfortable but speaking in full sentences  HEENT:  NC/AT,  conjunctivae clear and pink,  no JVD; No crepitus  CHEST:  tachypneic  HEART: tachycardic  ABDOMEN:  rigid, TTP throughout, distended, hypoactive bowel sounds; no rebound/guarding  EXTREMITIES:  no cyanosis,clubbing or edema  SKIN:  No rash/erythema/ecchymoses/petechiae/wounds/abscess/warm/dry  NEURO:  Alert, oriented x3    Vital Signs:  Vital Signs Last 24 Hrs  T(C): -- 36.6  T(F): --  HR: -- 121  BP: -- 121/78  BP(mean): --  RR: -- 29  SpO2: -- 100% on O2 nasal cannula  Daily     Daily     LABS:                        15.9   23.28 )-----------( 352      ( 06 Dec 2019 15:29 )             46.2     Comprehensive Metabolic Panel (12.06.19 @ 15:29)    Sodium, Serum: 142 mmol/L    Potassium, Serum: 3.5 mmol/L    Chloride, Serum: 87 mmol/L    Carbon Dioxide, Serum: 22 mmol/L    Anion Gap, Serum: 33 mmol/L    Blood Urea Nitrogen, Serum: 37 mg/dL    Creatinine, Serum: 2.74 mg/dL    Glucose, Serum: 198 mg/dL    Calcium, Total Serum: 9.5 mg/dL    Protein Total, Serum: 8.5 g/dL    Albumin, Serum: 4.8 g/dL    Bilirubin Total, Serum: 0.6 mg/dL    Alkaline Phosphatase, Serum: 98 U/L    Aspartate Aminotransferase (AST/SGOT): 27: Mild hemolysis results may be falsely elevated U/L    Alanine Aminotransferase (ALT/SGPT): 16 U/L    eGFR if Non : 22: Interpretative comment  The units for eGFR are mL/min/1.73M2 (normalized body surface area). The  eGFR is calculated from a serum creatinine using the CKD-EPI equation.  Other variables required for calculation are race, age and sex. Among  patients with chronic kidney disease (CKD), the eGFR is useful in  determining the stage of disease according to KDOQI CKD classification.  All eGFR results are reported numerically with the following  interpretation.          GFR                    With                 Without     (ml/min/1.73 m2)    Kidney Damage       Kidney Damage        >= 90                    Stage 1                     Normal        60-89                    Stage 2                     Decreased GFR        30-59     Stage 3                     Stage 3        15-29                    Stage 4                     Stage 4        < 15                      Stage 5                     Stage 5  Each stage of CKD assumes that the associated GFR level has been in  effect for at least 3 months. Determination of stages one and two (with  eGFR > 59 ml/min/m2) requires estimation of kidney damage for at least 3  months as defined by structural or functional abnormalities.  Limitations: All estimates of GFR will be less accurate for patients at  extremes of muscle mass (including but not limited to frail elderly,  critically ill, or cancer patients), those with unusual diets, and those  with conditions associated with reduced secretion or extrarenal  elimination of creatinine. The eGFR equation is not recommended for use  in patients with unstable creatinine levels. mL/min/1.73M2    eGFR if African American: 25 mL/min/1.73M2        Blood Gas Venous - Hemoglobin/Hematocrit (12.06.19 @ 15:29)    Total Hemoglobin, Calculated: 16.5 g/dL    Hematocrit, Calculated: 50 %      Imaging:  CT imaging review, report pending - likely SBO with fluid air level and distended small bowel throughout

## 2019-12-06 NOTE — ED PROVIDER NOTE - OBJECTIVE STATEMENT
75yo M h/o COPD, former alcoholic (has not seen doctor in years) p/w coffee ground emesis since yesterday. Today was weaker and confused, family concerned he he had a stroke so called EMS. has had previous episode of coffee ground emesis but never saw a doctor for it. found by EMS to be tachy, mentating well but unable to obtain BP. Denies dark stools. does not take nsaids, last drink years ago.

## 2019-12-06 NOTE — H&P ADULT - HISTORY OF PRESENT ILLNESS
75 y/o with PMhx of COPD and ETOH abuse presenting with abdominal pain x2 days. Reports that pain felt more like gas pain, and had similar episodes in the past. Felt nauseous and couldn't vomit. Patient forced himself to vomit had hematemesis. Unable to tolerate PO in last day. Reports unable to pass gas or have BM in 3 days. Patient reports that he does not follow up with a PMD regularly, and can't recall last time he saw doctor. Reports he had colonoscopy recently with no significant findings. Last drink was 3 years ago. Reports some difficulty breathing. Denies fevers, chills, chest pain, or urinary changes.

## 2019-12-06 NOTE — H&P ADULT - NSICDXPASTSURGICALHX_GEN_ALL_CORE_FT
PAST SURGICAL HISTORY:  History of appendectomy     History of lumbosacral spine surgery     History of prostate surgery

## 2019-12-07 ENCOUNTER — TRANSCRIPTION ENCOUNTER (OUTPATIENT)
Age: 74
End: 2019-12-07

## 2019-12-07 LAB
ALBUMIN SERPL ELPH-MCNC: 2.8 G/DL — LOW (ref 3.3–5)
ALBUMIN SERPL ELPH-MCNC: 3.3 G/DL — SIGNIFICANT CHANGE UP (ref 3.3–5)
ALP SERPL-CCNC: 31 U/L — LOW (ref 40–120)
ALP SERPL-CCNC: 33 U/L — LOW (ref 40–120)
ALP SERPL-CCNC: 38 U/L — LOW (ref 40–120)
ALP SERPL-CCNC: 43 U/L — SIGNIFICANT CHANGE UP (ref 40–120)
ALT FLD-CCNC: 24 U/L — SIGNIFICANT CHANGE UP (ref 10–45)
ANION GAP SERPL CALC-SCNC: 14 MMOL/L — SIGNIFICANT CHANGE UP (ref 5–17)
ANION GAP SERPL CALC-SCNC: 14 MMOL/L — SIGNIFICANT CHANGE UP (ref 5–17)
ANION GAP SERPL CALC-SCNC: 15 MMOL/L — SIGNIFICANT CHANGE UP (ref 5–17)
ANION GAP SERPL CALC-SCNC: 16 MMOL/L — SIGNIFICANT CHANGE UP (ref 5–17)
ANION GAP SERPL CALC-SCNC: 16 MMOL/L — SIGNIFICANT CHANGE UP (ref 5–17)
ANION GAP SERPL CALC-SCNC: 17 MMOL/L — SIGNIFICANT CHANGE UP (ref 5–17)
APTT BLD: 31.1 SEC — SIGNIFICANT CHANGE UP (ref 27.5–36.3)
AST SERPL-CCNC: 52 U/L — HIGH (ref 10–40)
AST SERPL-CCNC: 56 U/L — HIGH (ref 10–40)
AST SERPL-CCNC: 56 U/L — HIGH (ref 10–40)
AST SERPL-CCNC: 58 U/L — HIGH (ref 10–40)
BILIRUB DIRECT SERPL-MCNC: 0.3 MG/DL — HIGH (ref 0–0.2)
BILIRUB INDIRECT FLD-MCNC: 0.2 MG/DL — SIGNIFICANT CHANGE UP (ref 0.2–1)
BILIRUB INDIRECT FLD-MCNC: 0.3 MG/DL — SIGNIFICANT CHANGE UP (ref 0.2–1)
BILIRUB INDIRECT FLD-MCNC: 0.4 MG/DL — SIGNIFICANT CHANGE UP (ref 0.2–1)
BILIRUB INDIRECT FLD-MCNC: 0.5 MG/DL — SIGNIFICANT CHANGE UP (ref 0.2–1)
BILIRUB SERPL-MCNC: 0.5 MG/DL — SIGNIFICANT CHANGE UP (ref 0.2–1.2)
BILIRUB SERPL-MCNC: 0.6 MG/DL — SIGNIFICANT CHANGE UP (ref 0.2–1.2)
BILIRUB SERPL-MCNC: 0.7 MG/DL — SIGNIFICANT CHANGE UP (ref 0.2–1.2)
BILIRUB SERPL-MCNC: 0.8 MG/DL — SIGNIFICANT CHANGE UP (ref 0.2–1.2)
BUN SERPL-MCNC: 41 MG/DL — HIGH (ref 7–23)
BUN SERPL-MCNC: 42 MG/DL — HIGH (ref 7–23)
BUN SERPL-MCNC: 43 MG/DL — HIGH (ref 7–23)
BUN SERPL-MCNC: 43 MG/DL — HIGH (ref 7–23)
BUN SERPL-MCNC: 44 MG/DL — HIGH (ref 7–23)
BUN SERPL-MCNC: 44 MG/DL — HIGH (ref 7–23)
CALCIUM SERPL-MCNC: 7 MG/DL — LOW (ref 8.4–10.5)
CALCIUM SERPL-MCNC: 7.6 MG/DL — LOW (ref 8.4–10.5)
CALCIUM SERPL-MCNC: 7.7 MG/DL — LOW (ref 8.4–10.5)
CALCIUM SERPL-MCNC: 7.9 MG/DL — LOW (ref 8.4–10.5)
CALCIUM SERPL-MCNC: 8 MG/DL — LOW (ref 8.4–10.5)
CALCIUM SERPL-MCNC: 8.4 MG/DL — SIGNIFICANT CHANGE UP (ref 8.4–10.5)
CHLORIDE SERPL-SCNC: 104 MMOL/L — SIGNIFICANT CHANGE UP (ref 96–108)
CHLORIDE SERPL-SCNC: 105 MMOL/L — SIGNIFICANT CHANGE UP (ref 96–108)
CHLORIDE SERPL-SCNC: 105 MMOL/L — SIGNIFICANT CHANGE UP (ref 96–108)
CHLORIDE SERPL-SCNC: 106 MMOL/L — SIGNIFICANT CHANGE UP (ref 96–108)
CHLORIDE SERPL-SCNC: 106 MMOL/L — SIGNIFICANT CHANGE UP (ref 96–108)
CHLORIDE SERPL-SCNC: 108 MMOL/L — SIGNIFICANT CHANGE UP (ref 96–108)
CO2 SERPL-SCNC: 19 MMOL/L — LOW (ref 22–31)
CO2 SERPL-SCNC: 20 MMOL/L — LOW (ref 22–31)
CO2 SERPL-SCNC: 20 MMOL/L — LOW (ref 22–31)
CREAT SERPL-MCNC: 1.78 MG/DL — HIGH (ref 0.5–1.3)
CREAT SERPL-MCNC: 1.88 MG/DL — HIGH (ref 0.5–1.3)
CREAT SERPL-MCNC: 2.14 MG/DL — HIGH (ref 0.5–1.3)
CREAT SERPL-MCNC: 2.19 MG/DL — HIGH (ref 0.5–1.3)
CREAT SERPL-MCNC: 2.24 MG/DL — HIGH (ref 0.5–1.3)
CREAT SERPL-MCNC: 2.33 MG/DL — HIGH (ref 0.5–1.3)
GAS PNL BLDA: SIGNIFICANT CHANGE UP
GLUCOSE SERPL-MCNC: 101 MG/DL — HIGH (ref 70–99)
GLUCOSE SERPL-MCNC: 103 MG/DL — HIGH (ref 70–99)
GLUCOSE SERPL-MCNC: 79 MG/DL — SIGNIFICANT CHANGE UP (ref 70–99)
GLUCOSE SERPL-MCNC: 79 MG/DL — SIGNIFICANT CHANGE UP (ref 70–99)
GLUCOSE SERPL-MCNC: 97 MG/DL — SIGNIFICANT CHANGE UP (ref 70–99)
GLUCOSE SERPL-MCNC: 99 MG/DL — SIGNIFICANT CHANGE UP (ref 70–99)
HCT VFR BLD CALC: 40.6 % — SIGNIFICANT CHANGE UP (ref 39–50)
HCT VFR BLD CALC: 41.3 % — SIGNIFICANT CHANGE UP (ref 39–50)
HCT VFR BLD CALC: 41.3 % — SIGNIFICANT CHANGE UP (ref 39–50)
HCT VFR BLD CALC: 44.1 % — SIGNIFICANT CHANGE UP (ref 39–50)
HGB BLD-MCNC: 13.8 G/DL — SIGNIFICANT CHANGE UP (ref 13–17)
HGB BLD-MCNC: 14.1 G/DL — SIGNIFICANT CHANGE UP (ref 13–17)
HGB BLD-MCNC: 14.6 G/DL — SIGNIFICANT CHANGE UP (ref 13–17)
HGB BLD-MCNC: 15.1 G/DL — SIGNIFICANT CHANGE UP (ref 13–17)
INR BLD: 1.22 RATIO — HIGH (ref 0.88–1.16)
MAGNESIUM SERPL-MCNC: 2.2 MG/DL — SIGNIFICANT CHANGE UP (ref 1.6–2.6)
MAGNESIUM SERPL-MCNC: 2.3 MG/DL — SIGNIFICANT CHANGE UP (ref 1.6–2.6)
MAGNESIUM SERPL-MCNC: 2.4 MG/DL — SIGNIFICANT CHANGE UP (ref 1.6–2.6)
MAGNESIUM SERPL-MCNC: 2.6 MG/DL — SIGNIFICANT CHANGE UP (ref 1.6–2.6)
MCHC RBC-ENTMCNC: 30.4 PG — SIGNIFICANT CHANGE UP (ref 27–34)
MCHC RBC-ENTMCNC: 30.6 PG — SIGNIFICANT CHANGE UP (ref 27–34)
MCHC RBC-ENTMCNC: 30.8 PG — SIGNIFICANT CHANGE UP (ref 27–34)
MCHC RBC-ENTMCNC: 31.4 PG — SIGNIFICANT CHANGE UP (ref 27–34)
MCHC RBC-ENTMCNC: 33.4 GM/DL — SIGNIFICANT CHANGE UP (ref 32–36)
MCHC RBC-ENTMCNC: 34.2 GM/DL — SIGNIFICANT CHANGE UP (ref 32–36)
MCHC RBC-ENTMCNC: 34.7 GM/DL — SIGNIFICANT CHANGE UP (ref 32–36)
MCHC RBC-ENTMCNC: 35.4 GM/DL — SIGNIFICANT CHANGE UP (ref 32–36)
MCV RBC AUTO: 88.6 FL — SIGNIFICANT CHANGE UP (ref 80–100)
MCV RBC AUTO: 88.8 FL — SIGNIFICANT CHANGE UP (ref 80–100)
MCV RBC AUTO: 89.5 FL — SIGNIFICANT CHANGE UP (ref 80–100)
MCV RBC AUTO: 91 FL — SIGNIFICANT CHANGE UP (ref 80–100)
NRBC # BLD: 0 /100 WBCS — SIGNIFICANT CHANGE UP (ref 0–0)
PHOSPHATE SERPL-MCNC: 3.3 MG/DL — SIGNIFICANT CHANGE UP (ref 2.5–4.5)
PHOSPHATE SERPL-MCNC: 3.6 MG/DL — SIGNIFICANT CHANGE UP (ref 2.5–4.5)
PHOSPHATE SERPL-MCNC: 3.9 MG/DL — SIGNIFICANT CHANGE UP (ref 2.5–4.5)
PHOSPHATE SERPL-MCNC: 4.8 MG/DL — HIGH (ref 2.5–4.5)
PLATELET # BLD AUTO: 185 K/UL — SIGNIFICANT CHANGE UP (ref 150–400)
PLATELET # BLD AUTO: 187 K/UL — SIGNIFICANT CHANGE UP (ref 150–400)
PLATELET # BLD AUTO: 230 K/UL — SIGNIFICANT CHANGE UP (ref 150–400)
PLATELET # BLD AUTO: 233 K/UL — SIGNIFICANT CHANGE UP (ref 150–400)
POTASSIUM SERPL-MCNC: 4.5 MMOL/L — SIGNIFICANT CHANGE UP (ref 3.5–5.3)
POTASSIUM SERPL-MCNC: 4.7 MMOL/L — SIGNIFICANT CHANGE UP (ref 3.5–5.3)
POTASSIUM SERPL-MCNC: 4.9 MMOL/L — SIGNIFICANT CHANGE UP (ref 3.5–5.3)
POTASSIUM SERPL-MCNC: 5.3 MMOL/L — SIGNIFICANT CHANGE UP (ref 3.5–5.3)
POTASSIUM SERPL-MCNC: 5.6 MMOL/L — HIGH (ref 3.5–5.3)
POTASSIUM SERPL-MCNC: 5.9 MMOL/L — HIGH (ref 3.5–5.3)
POTASSIUM SERPL-SCNC: 4.5 MMOL/L — SIGNIFICANT CHANGE UP (ref 3.5–5.3)
POTASSIUM SERPL-SCNC: 4.7 MMOL/L — SIGNIFICANT CHANGE UP (ref 3.5–5.3)
POTASSIUM SERPL-SCNC: 4.9 MMOL/L — SIGNIFICANT CHANGE UP (ref 3.5–5.3)
POTASSIUM SERPL-SCNC: 5.3 MMOL/L — SIGNIFICANT CHANGE UP (ref 3.5–5.3)
POTASSIUM SERPL-SCNC: 5.6 MMOL/L — HIGH (ref 3.5–5.3)
POTASSIUM SERPL-SCNC: 5.9 MMOL/L — HIGH (ref 3.5–5.3)
PROT SERPL-MCNC: 4.5 G/DL — LOW (ref 6–8.3)
PROT SERPL-MCNC: 5.1 G/DL — LOW (ref 6–8.3)
PROT SERPL-MCNC: 5.2 G/DL — LOW (ref 6–8.3)
PROT SERPL-MCNC: 5.3 G/DL — LOW (ref 6–8.3)
PROTHROM AB SERPL-ACNC: 14.1 SEC — HIGH (ref 10–12.9)
RBC # BLD: 4.54 M/UL — SIGNIFICANT CHANGE UP (ref 4.2–5.8)
RBC # BLD: 4.58 M/UL — SIGNIFICANT CHANGE UP (ref 4.2–5.8)
RBC # BLD: 4.65 M/UL — SIGNIFICANT CHANGE UP (ref 4.2–5.8)
RBC # BLD: 4.93 M/UL — SIGNIFICANT CHANGE UP (ref 4.2–5.8)
RBC # FLD: 13.3 % — SIGNIFICANT CHANGE UP (ref 10.3–14.5)
RBC # FLD: 13.6 % — SIGNIFICANT CHANGE UP (ref 10.3–14.5)
RBC # FLD: 13.7 % — SIGNIFICANT CHANGE UP (ref 10.3–14.5)
RBC # FLD: 13.8 % — SIGNIFICANT CHANGE UP (ref 10.3–14.5)
SODIUM SERPL-SCNC: 139 MMOL/L — SIGNIFICANT CHANGE UP (ref 135–145)
SODIUM SERPL-SCNC: 140 MMOL/L — SIGNIFICANT CHANGE UP (ref 135–145)
SODIUM SERPL-SCNC: 141 MMOL/L — SIGNIFICANT CHANGE UP (ref 135–145)
SODIUM SERPL-SCNC: 142 MMOL/L — SIGNIFICANT CHANGE UP (ref 135–145)
WBC # BLD: 11.56 K/UL — HIGH (ref 3.8–10.5)
WBC # BLD: 12.41 K/UL — HIGH (ref 3.8–10.5)
WBC # BLD: 12.78 K/UL — HIGH (ref 3.8–10.5)
WBC # BLD: 12.99 K/UL — HIGH (ref 3.8–10.5)
WBC # FLD AUTO: 11.56 K/UL — HIGH (ref 3.8–10.5)
WBC # FLD AUTO: 12.41 K/UL — HIGH (ref 3.8–10.5)
WBC # FLD AUTO: 12.78 K/UL — HIGH (ref 3.8–10.5)
WBC # FLD AUTO: 12.99 K/UL — HIGH (ref 3.8–10.5)

## 2019-12-07 PROCEDURE — 71045 X-RAY EXAM CHEST 1 VIEW: CPT | Mod: 26

## 2019-12-07 PROCEDURE — 93306 TTE W/DOPPLER COMPLETE: CPT | Mod: 26

## 2019-12-07 PROCEDURE — 99291 CRITICAL CARE FIRST HOUR: CPT

## 2019-12-07 RX ORDER — SODIUM CHLORIDE 9 MG/ML
500 INJECTION INTRAMUSCULAR; INTRAVENOUS; SUBCUTANEOUS ONCE
Refills: 0 | Status: COMPLETED | OUTPATIENT
Start: 2019-12-07 | End: 2019-12-07

## 2019-12-07 RX ORDER — SODIUM CHLORIDE 9 MG/ML
1000 INJECTION INTRAMUSCULAR; INTRAVENOUS; SUBCUTANEOUS
Refills: 0 | Status: DISCONTINUED | OUTPATIENT
Start: 2019-12-07 | End: 2019-12-08

## 2019-12-07 RX ORDER — CALCIUM GLUCONATE 100 MG/ML
2 VIAL (ML) INTRAVENOUS ONCE
Refills: 0 | Status: COMPLETED | OUTPATIENT
Start: 2019-12-07 | End: 2019-12-07

## 2019-12-07 RX ORDER — ALBUMIN HUMAN 25 %
250 VIAL (ML) INTRAVENOUS ONCE
Refills: 0 | Status: COMPLETED | OUTPATIENT
Start: 2019-12-07 | End: 2019-12-07

## 2019-12-07 RX ORDER — SODIUM CHLORIDE 9 MG/ML
1000 INJECTION INTRAMUSCULAR; INTRAVENOUS; SUBCUTANEOUS ONCE
Refills: 0 | Status: COMPLETED | OUTPATIENT
Start: 2019-12-07 | End: 2019-12-07

## 2019-12-07 RX ORDER — MAGNESIUM SULFATE 500 MG/ML
2 VIAL (ML) INJECTION ONCE
Refills: 0 | Status: COMPLETED | OUTPATIENT
Start: 2019-12-07 | End: 2019-12-07

## 2019-12-07 RX ORDER — NOREPINEPHRINE BITARTRATE/D5W 8 MG/250ML
0.05 PLASTIC BAG, INJECTION (ML) INTRAVENOUS
Qty: 8 | Refills: 0 | Status: DISCONTINUED | OUTPATIENT
Start: 2019-12-07 | End: 2019-12-08

## 2019-12-07 RX ORDER — NOREPINEPHRINE BITARTRATE/D5W 8 MG/250ML
0.05 PLASTIC BAG, INJECTION (ML) INTRAVENOUS
Qty: 8 | Refills: 0 | Status: DISCONTINUED | OUTPATIENT
Start: 2019-12-07 | End: 2019-12-07

## 2019-12-07 RX ADMIN — SODIUM CHLORIDE 1000 MILLILITER(S): 9 INJECTION INTRAMUSCULAR; INTRAVENOUS; SUBCUTANEOUS at 12:35

## 2019-12-07 RX ADMIN — SODIUM CHLORIDE 100 MILLILITER(S): 9 INJECTION, SOLUTION INTRAVENOUS at 04:27

## 2019-12-07 RX ADMIN — ENOXAPARIN SODIUM 30 MILLIGRAM(S): 100 INJECTION SUBCUTANEOUS at 05:23

## 2019-12-07 RX ADMIN — Medication 5.08 MICROGRAM(S)/KG/MIN: at 07:21

## 2019-12-07 RX ADMIN — DEXMEDETOMIDINE HYDROCHLORIDE IN 0.9% SODIUM CHLORIDE 2.71 MICROGRAM(S)/KG/HR: 4 INJECTION INTRAVENOUS at 18:27

## 2019-12-07 RX ADMIN — Medication 1 MILLIGRAM(S): at 04:27

## 2019-12-07 RX ADMIN — PIPERACILLIN AND TAZOBACTAM 25 GRAM(S): 4; .5 INJECTION, POWDER, LYOPHILIZED, FOR SOLUTION INTRAVENOUS at 17:37

## 2019-12-07 RX ADMIN — Medication 50 GRAM(S): at 18:28

## 2019-12-07 RX ADMIN — FENTANYL CITRATE 2.71 MICROGRAM(S)/KG/HR: 50 INJECTION INTRAVENOUS at 18:27

## 2019-12-07 RX ADMIN — Medication 50 MILLIEQUIVALENT(S): at 01:11

## 2019-12-07 RX ADMIN — SODIUM CHLORIDE 75 MILLILITER(S): 9 INJECTION INTRAMUSCULAR; INTRAVENOUS; SUBCUTANEOUS at 10:31

## 2019-12-07 RX ADMIN — Medication 750 MILLILITER(S): at 01:11

## 2019-12-07 RX ADMIN — Medication 5.08 MICROGRAM(S)/KG/MIN: at 01:19

## 2019-12-07 RX ADMIN — Medication 3 MILLILITER(S): at 12:01

## 2019-12-07 RX ADMIN — SODIUM CHLORIDE 75 MILLILITER(S): 9 INJECTION INTRAMUSCULAR; INTRAVENOUS; SUBCUTANEOUS at 18:27

## 2019-12-07 RX ADMIN — PIPERACILLIN AND TAZOBACTAM 25 GRAM(S): 4; .5 INJECTION, POWDER, LYOPHILIZED, FOR SOLUTION INTRAVENOUS at 05:23

## 2019-12-07 RX ADMIN — Medication 3 MILLILITER(S): at 17:34

## 2019-12-07 RX ADMIN — Medication 5.08 MICROGRAM(S)/KG/MIN: at 18:26

## 2019-12-07 RX ADMIN — VASOPRESSIN 1.8 UNIT(S)/MIN: 20 INJECTION INTRAVENOUS at 18:27

## 2019-12-07 RX ADMIN — CHLORHEXIDINE GLUCONATE 15 MILLILITER(S): 213 SOLUTION TOPICAL at 15:31

## 2019-12-07 RX ADMIN — CHLORHEXIDINE GLUCONATE 15 MILLILITER(S): 213 SOLUTION TOPICAL at 05:23

## 2019-12-07 RX ADMIN — VASOPRESSIN 1.8 UNIT(S)/MIN: 20 INJECTION INTRAVENOUS at 07:22

## 2019-12-07 RX ADMIN — PANTOPRAZOLE SODIUM 40 MILLIGRAM(S): 20 TABLET, DELAYED RELEASE ORAL at 05:23

## 2019-12-07 RX ADMIN — Medication 200 GRAM(S): at 15:31

## 2019-12-07 RX ADMIN — Medication 3 MILLILITER(S): at 05:05

## 2019-12-07 RX ADMIN — Medication 50 MILLIEQUIVALENT(S): at 03:08

## 2019-12-07 RX ADMIN — CHLORHEXIDINE GLUCONATE 1 APPLICATION(S): 213 SOLUTION TOPICAL at 05:42

## 2019-12-07 RX ADMIN — FENTANYL CITRATE 2.71 MICROGRAM(S)/KG/HR: 50 INJECTION INTRAVENOUS at 07:22

## 2019-12-07 RX ADMIN — Medication 750 MILLILITER(S): at 06:32

## 2019-12-07 RX ADMIN — PANTOPRAZOLE SODIUM 40 MILLIGRAM(S): 20 TABLET, DELAYED RELEASE ORAL at 17:52

## 2019-12-07 RX ADMIN — DEXMEDETOMIDINE HYDROCHLORIDE IN 0.9% SODIUM CHLORIDE 2.71 MICROGRAM(S)/KG/HR: 4 INJECTION INTRAVENOUS at 07:20

## 2019-12-07 RX ADMIN — SODIUM CHLORIDE 3000 MILLILITER(S): 9 INJECTION INTRAMUSCULAR; INTRAVENOUS; SUBCUTANEOUS at 10:31

## 2019-12-07 RX ADMIN — SODIUM CHLORIDE 75 MILLILITER(S): 9 INJECTION INTRAMUSCULAR; INTRAVENOUS; SUBCUTANEOUS at 07:22

## 2019-12-07 RX ADMIN — Medication 3 MILLILITER(S): at 00:50

## 2019-12-07 NOTE — PROGRESS NOTE ADULT - SUBJECTIVE AND OBJECTIVE BOX
HISTORY  74 year old male with a past medical history of COPD and EtOH dependence who presented today with abdominal pain, nausea, hematemesis, and poor PO intake for ~2-3 days. Labs significant for an CHI w/ Cr 2.74 and lactate of 9.4. He was also notably tachycardic to the 110s and hypotensive w/ SBP in the 70s. He was given 2 units of PRBCs and 2 L of LR in the ED due to concern for upper GI bleeding. Imaging revealed high grade SBO in the RLQ. Patient was taken to the OR emergently for an exploratory laparotomy, lysis of adhesions, decompression of bowel via enterotomy w/ primary repair, and Abthera VAC placement. Of note, the distal 50% of the bowel appeared dusky but viable. He required vasopressor support with phenylephrine and vasopressin infusions. He received 3000 mL of crystalloid w/ EBL of 10 mL and UOP of 25 mL. Patient was left intubated at the end of the case so SICU was consulted for hemodynamic monitoring.    Of note, patient does not seem to have sought medical care in some time.    Unable to obtain HPI, history, or ROS from patient as he is sedated and intubated. History obtained from chart.    24 HOUR EVENTS:  -s/p exploratory laparotomy w/ decompression of bowel and abthera vac placement  -continued on pressors overnight, requring levo and vaso. precedex for sedation  -WBC improved  -hyperkalemic after repletion however improved during morning  -lactate improved at 3  -s/p 500cc albumin overnight    SUBJECTIVE/ROS:  [ ] A ten-point review of systems was otherwise negative except as noted.  [ ] Due to altered mental status/intubation, subjective information were not able to be obtained from the patient. History was obtained, to the extent possible, from review of the chart and collateral sources of information.      NEURO  Exam: intubated on precedex, following commands  Meds: dexMEDEtomidine Infusion 0.2 MICROgram(s)/kG/Hr IV Continuous <Continuous>  fentaNYL   Infusion. 1 MICROgram(s)/kG/Hr IV Continuous <Continuous>    [x] Adequacy of sedation and pain control has been assessed and adjusted      RESPIRATORY  RR: 22 (12-07-19 @ 07:00) (15 - 32)  SpO2: 100% (12-07-19 @ 10:00) (93% - 100%)  Wt(kg): --  Exam: unlabored, clear to auscultation bilaterally  Mechanical Ventilation: Mode: AC/ CMV (Assist Control/ Continuous Mandatory Ventilation), RR (machine): 22, RR (patient): 22, TV (machine): 450, FiO2: 40, PEEP: 5, ITime: 1, MAP: 0.9, PIP: 20  ABG - ( 07 Dec 2019 08:37 )  pH: 7.36  /  pCO2: 39    /  pO2: 83    / HCO3: 21    / Base Excess: -3.3  /  SaO2: 97      Lactate: x                [N/A] Extubation Readiness Assessed  Meds: albuterol/ipratropium for Nebulization 3 milliLiter(s) Nebulizer every 6 hours        CARDIOVASCULAR  HR: 92 (12-07-19 @ 10:00) (88 - 121)  BP: 113/58 (12-07-19 @ 07:15) (75/57 - 142/79)  BP(mean): 79 (12-07-19 @ 07:15) (63 - 104)  ABP: 115/59 (12-07-19 @ 10:00) (75/51 - 150/77)  ABP(mean): 78 (12-07-19 @ 10:00) (61 - 107)  Wt(kg): --  CVP(cm H2O): --  VBG - ( 06 Dec 2019 15:29 )  pH: 7.27  /  pCO2: 67    /  pO2: <20   / HCO3: 29    / Base Excess: 0.4   /  SaO2: 13     Lactate: 9.4                Exam: regular rate and rhythm  Cardiac Rhythm: sinus  Perfusion     [x]Adequate   [ ]Inadequate  Mentation   [x]Normal       [ ]Reduced  Extremities  [x]Warm         [ ]Cool  Volume Status [ ]Hypervolemic [x]Euvolemic [ ]Hypovolemic  Meds: norepinephrine Infusion 0.05 MICROgram(s)/kG/Min IV Continuous <Continuous>        GI/NUTRITION  Exam: soft, appropriately tender, nondistended, abtehra vac in place w/ ss output  Diet: NPO  Meds: pantoprazole  Injectable 40 milliGRAM(s) IV Push every 12 hours      GENITOURINARY  I&O's Detail    12-06 @ 07:01  -  12-07 @ 07:00  --------------------------------------------------------  IN:    Albumin 5%  - 250 mL: 500 mL    dexmedetomidine Infusion: 73.1 mL    fentaNYL Infusion.: 24.3 mL    IV PiggyBack: 350 mL    norepinephrine Infusion: 126 mL    sodium chloride 0.9%: 800 mL    Sodium Chloride 0.9% IV Bolus: 1000 mL    sodium chloride 0.9%.: 75 mL    Solution: 50 mL    vasopressin Infusion: 16.2 mL  Total IN: 3014.6 mL    OUT:    Indwelling Catheter - Urethral: 325 mL    Nasoenteral Tube: 200 mL    VAC (Vacuum Assisted Closure) System: 500 mL  Total OUT: 1025 mL    Total NET: 1989.6 mL      12-07 @ 07:01  -  12-07 @ 10:14  --------------------------------------------------------  IN:  Total IN: 0 mL    OUT:    Indwelling Catheter - Urethral: 130 mL  Total OUT: 130 mL    Total NET: -130 mL        Weight (kg): 54.2 (12-06 @ 22:12)  12-07    141  |  104  |  44<H>  ----------------------------<  101<H>  4.9   |  20<L>  |  2.14<H>    Ca    7.6<L>      07 Dec 2019 08:40  Phos  3.9     12-07  Mg     2.4     12-07    TPro  5.2<L>  /  Alb  3.3  /  TBili  0.7  /  DBili  0.3<H>  /  AST  58<H>  /  ALT  24  /  AlkPhos  38<L>  12-07    [ ] Martinez catheter, indication: N/A  Meds: sodium chloride 0.9%. 1000 milliLiter(s) IV Continuous <Continuous>        HEMATOLOGIC  Meds: enoxaparin Injectable 30 milliGRAM(s) SubCutaneous every 24 hours    [x] VTE Prophylaxis                        14.6   12.78 )-----------( 230      ( 07 Dec 2019 08:40 )             41.3     PT/INR - ( 07 Dec 2019 04:39 )   PT: 14.1 sec;   INR: 1.22 ratio         PTT - ( 07 Dec 2019 04:39 )  PTT:31.1 sec  Transfusion     [ ] PRBC   [ ] Platelets   [ ] FFP   [ ] Cryoprecipitate      INFECTIOUS DISEASES  WBC Count: 12.78 K/uL (12-07 @ 08:40)  WBC Count: 11.56 K/uL (12-07 @ 04:39)  WBC Count: 14.16 K/uL (12-06 @ 22:27)  WBC Count: 23.28 K/uL (12-06 @ 15:29)    RECENT CULTURES:    Meds: piperacillin/tazobactam IVPB.. 3.375 Gram(s) IV Intermittent every 12 hours        ENDOCRINE  CAPILLARY BLOOD GLUCOSE        Meds: vasopressin Infusion 0.03 Unit(s)/Min IV Continuous <Continuous>        ACCESS DEVICES:  [ ] Peripheral IV  [x] Central Venous Line	[ ] R	[ ] L	[ ] IJ	[ ] Fem	[ ] SC	Placed:   [ ] Arterial Line		[ ] R	[ ] L	[ ] Fem	[ ] Rad	[ ] Ax	Placed:   [ ] PICC:					[ ] Mediport  [ ] Urinary Catheter, Date Placed:   [x] Necessity of urinary, arterial, and venous catheters discussed    OTHER MEDICATIONS:  chlorhexidine 0.12% Liquid 15 milliLiter(s) Oral Mucosa <User Schedule>  chlorhexidine 2% Cloths 1 Application(s) Topical <User Schedule>      CODE STATUS: full code      IMAGING:

## 2019-12-07 NOTE — PROGRESS NOTE ADULT - ASSESSMENT
73 y/o M presenting with septic shock and high grade SBO s/p exploratory laparotomy, lysis of adhesions, decompression of bowel via enterotomy w/ primary repair, and Abthera VAC placement POD1.     PLAN:    Neuro:  -continue precedex for sedation, currently following commands  -fentanyl for pain    Resp:  -daily am CXR  -continue vent, adjust settings as needed  -hx of COPD, continue duoenbs    CV:  -trend lactate - improved  -continue to wean pressors as tolerated  -strict Is/Os  -U/s today    GI:  -NPO/NGT  -Abthera vac, monitor output  -likely RTOR Sunday 12/8    Renal:  -CHI - Cr 2.74 on presentation, now 2.14  -monitor urine output 30-50 cc/hr overnight  -s/p 500cc albumin overnight  -strict Is/Os    Heme:  -Lovenox renally dosed    ID:  -continue zosyn  -WBC improved from preop    Endo:  -no active issues, will monitor glucose on BMP    Disposition:  - Full code  - SICU    SICU, [id number]

## 2019-12-07 NOTE — PROGRESS NOTE ADULT - SUBJECTIVE AND OBJECTIVE BOX
GENERAL SURGERY DAILY PROGRESS NOTE:    Interval:  24 HOUR EVENTS:  -s/p exploratory laparotomy w/ decompression of bowel and abthera vac placement  -continued on pressors overnight, requring levo and vaso. precedex for sedation  -WBC improved  -hyperkalemic after repletion however improved during morning  -lactate improved at 3  -s/p 500cc albumin overnight    Subjective:  Patient seen and examined this am.     Vital Signs Last 24 Hrs  T(C): 37.1 (07 Dec 2019 07:00), Max: 37.5 (07 Dec 2019 03:00)  T(F): 98.8 (07 Dec 2019 07:00), Max: 99.5 (07 Dec 2019 03:00)  HR: 90 (07 Dec 2019 10:45) (88 - 121)  BP: 113/58 (07 Dec 2019 07:15) (75/57 - 142/79)  BP(mean): 79 (07 Dec 2019 07:15) (63 - 104)  RR: 22 (07 Dec 2019 07:00) (15 - 32)  SpO2: 100% (07 Dec 2019 10:45) (93% - 100%)    Exam:  Gen: NAD, resting in bed  Abd: Soft, ND, NTTP x 4 quadrants, no rebound or guarding. VAC  Ext: No edema, WWP    I&O's Detail    06 Dec 2019 07:01  -  07 Dec 2019 07:00  --------------------------------------------------------  IN:    Albumin 5%  - 250 mL: 500 mL    dexmedetomidine Infusion: 73.1 mL    fentaNYL Infusion.: 24.3 mL    IV PiggyBack: 350 mL    norepinephrine Infusion: 126 mL    sodium chloride 0.9%: 800 mL    Sodium Chloride 0.9% IV Bolus: 1000 mL    sodium chloride 0.9%.: 75 mL    Solution: 50 mL    vasopressin Infusion: 16.2 mL  Total IN: 3014.6 mL    OUT:    Indwelling Catheter - Urethral: 325 mL    Nasoenteral Tube: 200 mL    VAC (Vacuum Assisted Closure) System: 500 mL  Total OUT: 1025 mL    Total NET: 1989.6 mL      07 Dec 2019 07:01  -  07 Dec 2019 11:16  --------------------------------------------------------  IN:    dexmedetomidine Infusion: 36.6 mL    Enteral Tube Flush: 40 mL    fentaNYL Infusion.: 8.1 mL    IV PiggyBack: 50 mL    norepinephrine Infusion: 53.9 mL    Sodium Chloride 0.9% IV Bolus: 500 mL    sodium chloride 0.9%.: 225 mL    vasopressin Infusion: 5.4 mL  Total IN: 919 mL    OUT:    Indwelling Catheter - Urethral: 130 mL  Total OUT: 130 mL    Total NET: 789 mL          Daily Height in cm: 144.78 (06 Dec 2019 22:12)    Daily Weight in k.5 (07 Dec 2019 00:57)    MEDICATIONS  (STANDING):  albuterol/ipratropium for Nebulization 3 milliLiter(s) Nebulizer every 6 hours  chlorhexidine 0.12% Liquid 15 milliLiter(s) Oral Mucosa <User Schedule>  chlorhexidine 2% Cloths 1 Application(s) Topical <User Schedule>  dexMEDEtomidine Infusion 0.2 MICROgram(s)/kG/Hr (2.71 mL/Hr) IV Continuous <Continuous>  enoxaparin Injectable 30 milliGRAM(s) SubCutaneous every 24 hours  fentaNYL   Infusion. 1 MICROgram(s)/kG/Hr (2.71 mL/Hr) IV Continuous <Continuous>  norepinephrine Infusion 0.05 MICROgram(s)/kG/Min (5.081 mL/Hr) IV Continuous <Continuous>  pantoprazole  Injectable 40 milliGRAM(s) IV Push every 12 hours  piperacillin/tazobactam IVPB.. 3.375 Gram(s) IV Intermittent every 12 hours  sodium chloride 0.9%. 1000 milliLiter(s) (75 mL/Hr) IV Continuous <Continuous>  vasopressin Infusion 0.03 Unit(s)/Min (1.8 mL/Hr) IV Continuous <Continuous>    MEDICATIONS  (PRN):      LABS:                        14.6   12 )-----------( 230      ( 07 Dec 2019 08:40 )             41.3     12    141  |  104  |  44<H>  ----------------------------<  101<H>  4.9   |  20<L>  |  2.14<H>    Ca    7.6<L>      07 Dec 2019 08:40  Phos  3.9       Mg     2.4         TPro  5.2<L>  /  Alb  3.3  /  TBili  0.7  /  DBili  0.3<H>  /  AST  58<H>  /  ALT  24  /  AlkPhos  38<L>      PT/INR - ( 07 Dec 2019 04:39 )   PT: 14.1 sec;   INR: 1.22 ratio         PTT - ( 07 Dec 2019 04:39 )  PTT:31.1 sec      YAMILET Appiah, PGY-1  Red Team Surgery  [id number] with any questions

## 2019-12-07 NOTE — CHART NOTE - NSCHARTNOTEFT_GEN_A_CORE
Surgery Preop Note    Patient is a 74y old  Male who presents with a chief complaint of abd. pain (07 Dec 2019 11:16)    Diagnosis: High grade SBO  Procedure: Ex lap wound washout possible bowel resection possible ostomy possible closure  Surgeon: Christal    Pt seen examined on levo vaso     Vital Signs Last 24 Hrs  T(C): 37.2 (07 Dec 2019 15:00), Max: 37.5 (07 Dec 2019 03:00)  T(F): 99 (07 Dec 2019 15:00), Max: 99.5 (07 Dec 2019 03:00)  HR: 90 (07 Dec 2019 20:15) (84 - 106)  BP: 90/54 (07 Dec 2019 15:45) (82/54 - 142/79)  BP(mean): 66 (07 Dec 2019 15:45) (63 - 104)  RR: 22 (07 Dec 2019 20:15) (15 - 26)  SpO2: 98% (07 Dec 2019 20:15) (93% - 100%)    Exam:  Gen: NAD, resting in bed  Resp: Intubated  Abd: Soft, ND, no rebound or guarding. NGT VAC  Ext: No edema, WWP restraints ICDs  : [place]  Neuro: AAOx3, no focal deficits                          14.1   12.41 )-----------( 185      ( 07 Dec 2019 16:55 )             40.6     12-07    142  |  108  |  41<H>  ----------------------------<  79  4.7   |  19<L>  |  1.78<H>    Ca    8.4      07 Dec 2019 16:55  Phos  3.6     12-07  Mg     2.3     12-07    TPro  5.1<L>  /  Alb  2.8<L>  /  TBili  0.5  /  DBili  0.3<H>  /  AST  56<H>  /  ALT  24  /  AlkPhos  33<L>  12-07    PT/INR - ( 07 Dec 2019 04:39 )   PT: 14.1 sec;   INR: 1.22 ratio         PTT - ( 07 Dec 2019 04:39 )  PTT:31.1 sec  ABO Interpretation: O (12-06 @ 17:51)  ABO Interpretation: O (12-06 @ 15:31)      Chest X RAY 12/7 clear  EKG 12/6 sinus tach to 122     MEDICATIONS  (STANDING):  albuterol/ipratropium for Nebulization 3 milliLiter(s) Nebulizer every 6 hours  chlorhexidine 0.12% Liquid 15 milliLiter(s) Oral Mucosa <User Schedule>  chlorhexidine 2% Cloths 1 Application(s) Topical <User Schedule>  dexMEDEtomidine Infusion 0.2 MICROgram(s)/kG/Hr (2.71 mL/Hr) IV Continuous <Continuous>  enoxaparin Injectable 30 milliGRAM(s) SubCutaneous every 24 hours  fentaNYL   Infusion. 1 MICROgram(s)/kG/Hr (2.71 mL/Hr) IV Continuous <Continuous>  norepinephrine Infusion 0.05 MICROgram(s)/kG/Min (5.081 mL/Hr) IV Continuous <Continuous>  pantoprazole  Injectable 40 milliGRAM(s) IV Push every 12 hours  piperacillin/tazobactam IVPB.. 3.375 Gram(s) IV Intermittent every 12 hours  sodium chloride 0.9%. 1000 milliLiter(s) (75 mL/Hr) IV Continuous <Continuous>  vasopressin Infusion 0.03 Unit(s)/Min (1.8 mL/Hr) IV Continuous <Continuous>    MEDICATIONS  (PRN):      Assessment:  74M POD1 sp ex lap MIRYAM abthera placement for high grade SBO preop for ex lap wound washout possible bowel resection possible ostomy possible closure    Plan:  NPO   IVF  Pain Control  DVT prophylaxis  CBC BMP Mg Phos Coags TS as above  PreOp labs ordered  CXR EKG as above  Pt consented consent in chart    YAMILET Appiah, PGY-1  Red Team Surgery  [id number]

## 2019-12-07 NOTE — PATIENT PROFILE ADULT - LAST ORAL INTAKE
Depth Of Biopsy: dermis Lab: [id number] [facility] [address] Billing Type: [employer] Biopsy Type: H and E Bill 75374 For Specimen Handling/Conveyance To Laboratory?: no Curettage Text: The wound bed was treated with curettage after the biopsy was performed. Consent: Written consent was obtained and risks were reviewed including but not limited to scarring, infection, bleeding, scabbing, incomplete removal, nerve damage and allergy to anesthesia. Cryotherapy Text: The wound bed was treated with cryotherapy after the biopsy was performed. Anesthesia Volume In Cc (Will Not Render If 0): 0.5 Wound Care: Bacitracin Dressing: bandage Render Post-Care Instructions In Note?: yes Post-Care Instructions: I reviewed with the patient in detail post-care instructions. Patient is to keep the biopsy site dry overnight, and then apply bacitracin twice daily until healed. Patient may apply hydrogen peroxide soaks to remove any crusting. Additional Anesthesia Volume In Cc (Will Not Render If 0): 0 Anesthesia Type: 1% lidocaine with epinephrine Hemostasis: Drysol Lab Facility: [facility] Notification Instructions: Patient will be notified of biopsy results. However, patient instructed to call the office if not contacted within 2 weeks. Biopsy Method: 15 blade Detail Level: Detailed Electrodesiccation Text: The wound bed was treated with electrodesiccation after the biopsy was performed. Type Of Destruction Used: Electrodesiccation 06-Dec-2019 15:19

## 2019-12-07 NOTE — PROGRESS NOTE ADULT - ASSESSMENT
74M POD1 s/p ex lap, MIRYAM, closure of enterotomy, abthera vac placement    Plan:  - Diet: NPO  - Activity: bed rest  - Labs: f/u post op AM labs  - Monitor K Cr and lactate  - Pain medication  - DVT ppx  - c/w zosyn q12  - CIWA  - abthera  - Prep for OR tomorrow 12/8    Red Team Surg  [id number] 74M POD1 s/p ex lap, MIRYAM, closure of enterotomy, abthera vac placement    Plan:  - Diet: NPO  - Activity: bed rest  - Labs: f/u post op AM labs  - Monitor K Cr and lactate  - Pain medication  - DVT ppx  - c/w zosyn q12  - CIWA  - wean off pressors  - supportive care per SICU  - abthera  - Prep for OR tomorrow 12/8 for washout    Red Team Surg  [id number]

## 2019-12-07 NOTE — PROGRESS NOTE ADULT - ATTENDING COMMENTS
Patient seen and examined and agree with above.   Patient remains hypotensive due to hypovolemia and septic shock.  Current management includes:  1) Acute respiratory insufficiency- continue mechanical ventilation at this time.  Oxygenation and ventilation appropriate  CXR - clear lungs  Will proceed with spontaneous breathing trial when clinically improved  Continue oxygen supplementation with goal SpO2> 92%    2) Hypotension- secondary to hypovolemia  -on levophed 0.17 mcg/kg/min and vasopressin  -MAP goal > 65 mmHg    3) Acute kidney injury - improving  -adequate urine output  -will monitor very closely    4) H/H stable     5) Septic shock secondary to high grade small bowel obstruction - continue zosyn  -will monitor neutrophilic leukocytosis     CC time: 55 minutes

## 2019-12-08 DIAGNOSIS — R00.0 TACHYCARDIA, UNSPECIFIED: ICD-10-CM

## 2019-12-08 DIAGNOSIS — K56.609 UNSPECIFIED INTESTINAL OBSTRUCTION, UNSPECIFIED AS TO PARTIAL VERSUS COMPLETE OBSTRUCTION: ICD-10-CM

## 2019-12-08 LAB
ALBUMIN SERPL ELPH-MCNC: 2.4 G/DL — LOW (ref 3.3–5)
ALBUMIN SERPL ELPH-MCNC: 2.6 G/DL — LOW (ref 3.3–5)
ALBUMIN SERPL ELPH-MCNC: 2.7 G/DL — LOW (ref 3.3–5)
ALP SERPL-CCNC: 32 U/L — LOW (ref 40–120)
ALP SERPL-CCNC: 40 U/L — SIGNIFICANT CHANGE UP (ref 40–120)
ALP SERPL-CCNC: 41 U/L — SIGNIFICANT CHANGE UP (ref 40–120)
ALT FLD-CCNC: 26 U/L — SIGNIFICANT CHANGE UP (ref 10–45)
ALT FLD-CCNC: 26 U/L — SIGNIFICANT CHANGE UP (ref 10–45)
ALT FLD-CCNC: 28 U/L — SIGNIFICANT CHANGE UP (ref 10–45)
ANION GAP SERPL CALC-SCNC: 10 MMOL/L — SIGNIFICANT CHANGE UP (ref 5–17)
ANION GAP SERPL CALC-SCNC: 10 MMOL/L — SIGNIFICANT CHANGE UP (ref 5–17)
ANION GAP SERPL CALC-SCNC: 17 MMOL/L — SIGNIFICANT CHANGE UP (ref 5–17)
APTT BLD: 39.6 SEC — HIGH (ref 27.5–36.3)
APTT BLD: 47.9 SEC — HIGH (ref 27.5–36.3)
AST SERPL-CCNC: 48 U/L — HIGH (ref 10–40)
AST SERPL-CCNC: 51 U/L — HIGH (ref 10–40)
AST SERPL-CCNC: 53 U/L — HIGH (ref 10–40)
BILIRUB DIRECT SERPL-MCNC: 0.3 MG/DL — HIGH (ref 0–0.2)
BILIRUB INDIRECT FLD-MCNC: 0.3 MG/DL — SIGNIFICANT CHANGE UP (ref 0.2–1)
BILIRUB INDIRECT FLD-MCNC: 0.4 MG/DL — SIGNIFICANT CHANGE UP (ref 0.2–1)
BILIRUB INDIRECT FLD-MCNC: 0.4 MG/DL — SIGNIFICANT CHANGE UP (ref 0.2–1)
BILIRUB SERPL-MCNC: 0.6 MG/DL — SIGNIFICANT CHANGE UP (ref 0.2–1.2)
BILIRUB SERPL-MCNC: 0.7 MG/DL — SIGNIFICANT CHANGE UP (ref 0.2–1.2)
BILIRUB SERPL-MCNC: 0.7 MG/DL — SIGNIFICANT CHANGE UP (ref 0.2–1.2)
BLD GP AB SCN SERPL QL: NEGATIVE — SIGNIFICANT CHANGE UP
BUN SERPL-MCNC: 34 MG/DL — HIGH (ref 7–23)
BUN SERPL-MCNC: 36 MG/DL — HIGH (ref 7–23)
BUN SERPL-MCNC: 43 MG/DL — HIGH (ref 7–23)
CALCIUM SERPL-MCNC: 7.2 MG/DL — LOW (ref 8.4–10.5)
CALCIUM SERPL-MCNC: 7.8 MG/DL — LOW (ref 8.4–10.5)
CALCIUM SERPL-MCNC: 7.9 MG/DL — LOW (ref 8.4–10.5)
CHLORIDE SERPL-SCNC: 107 MMOL/L — SIGNIFICANT CHANGE UP (ref 96–108)
CHLORIDE SERPL-SCNC: 109 MMOL/L — HIGH (ref 96–108)
CHLORIDE SERPL-SCNC: 112 MMOL/L — HIGH (ref 96–108)
CK MB BLD-MCNC: 2.2 % — SIGNIFICANT CHANGE UP (ref 0–3.5)
CK MB CFR SERPL CALC: 23.1 NG/ML — HIGH (ref 0–6.7)
CK SERPL-CCNC: 1037 U/L — HIGH (ref 30–200)
CO2 SERPL-SCNC: 16 MMOL/L — LOW (ref 22–31)
CO2 SERPL-SCNC: 20 MMOL/L — LOW (ref 22–31)
CO2 SERPL-SCNC: 21 MMOL/L — LOW (ref 22–31)
CREAT SERPL-MCNC: 1.16 MG/DL — SIGNIFICANT CHANGE UP (ref 0.5–1.3)
CREAT SERPL-MCNC: 1.29 MG/DL — SIGNIFICANT CHANGE UP (ref 0.5–1.3)
CREAT SERPL-MCNC: 1.61 MG/DL — HIGH (ref 0.5–1.3)
GAS PNL BLDA: SIGNIFICANT CHANGE UP
GLUCOSE BLDC GLUCOMTR-MCNC: 110 MG/DL — HIGH (ref 70–99)
GLUCOSE SERPL-MCNC: 148 MG/DL — HIGH (ref 70–99)
GLUCOSE SERPL-MCNC: 152 MG/DL — HIGH (ref 70–99)
GLUCOSE SERPL-MCNC: 69 MG/DL — LOW (ref 70–99)
HCT VFR BLD CALC: 31.7 % — LOW (ref 39–50)
HCT VFR BLD CALC: 35 % — LOW (ref 39–50)
HCT VFR BLD CALC: 39.6 % — SIGNIFICANT CHANGE UP (ref 39–50)
HGB BLD-MCNC: 10.8 G/DL — LOW (ref 13–17)
HGB BLD-MCNC: 11.6 G/DL — LOW (ref 13–17)
HGB BLD-MCNC: 13.1 G/DL — SIGNIFICANT CHANGE UP (ref 13–17)
INR BLD: 1.53 RATIO — HIGH (ref 0.88–1.16)
INR BLD: 2.05 RATIO — HIGH (ref 0.88–1.16)
MAGNESIUM SERPL-MCNC: 3 MG/DL — HIGH (ref 1.6–2.6)
MCHC RBC-ENTMCNC: 30.4 PG — SIGNIFICANT CHANGE UP (ref 27–34)
MCHC RBC-ENTMCNC: 30.4 PG — SIGNIFICANT CHANGE UP (ref 27–34)
MCHC RBC-ENTMCNC: 30.5 PG — SIGNIFICANT CHANGE UP (ref 27–34)
MCHC RBC-ENTMCNC: 33.1 GM/DL — SIGNIFICANT CHANGE UP (ref 32–36)
MCHC RBC-ENTMCNC: 33.1 GM/DL — SIGNIFICANT CHANGE UP (ref 32–36)
MCHC RBC-ENTMCNC: 34.1 GM/DL — SIGNIFICANT CHANGE UP (ref 32–36)
MCV RBC AUTO: 89.3 FL — SIGNIFICANT CHANGE UP (ref 80–100)
MCV RBC AUTO: 91.9 FL — SIGNIFICANT CHANGE UP (ref 80–100)
MCV RBC AUTO: 92.1 FL — SIGNIFICANT CHANGE UP (ref 80–100)
NRBC # BLD: 0 /100 WBCS — SIGNIFICANT CHANGE UP (ref 0–0)
PHOSPHATE SERPL-MCNC: 2.9 MG/DL — SIGNIFICANT CHANGE UP (ref 2.5–4.5)
PHOSPHATE SERPL-MCNC: 3 MG/DL — SIGNIFICANT CHANGE UP (ref 2.5–4.5)
PHOSPHATE SERPL-MCNC: 3.9 MG/DL — SIGNIFICANT CHANGE UP (ref 2.5–4.5)
PLATELET # BLD AUTO: 116 K/UL — LOW (ref 150–400)
PLATELET # BLD AUTO: 127 K/UL — LOW (ref 150–400)
PLATELET # BLD AUTO: 168 K/UL — SIGNIFICANT CHANGE UP (ref 150–400)
POTASSIUM SERPL-MCNC: 3.7 MMOL/L — SIGNIFICANT CHANGE UP (ref 3.5–5.3)
POTASSIUM SERPL-MCNC: 4 MMOL/L — SIGNIFICANT CHANGE UP (ref 3.5–5.3)
POTASSIUM SERPL-MCNC: 4.5 MMOL/L — SIGNIFICANT CHANGE UP (ref 3.5–5.3)
POTASSIUM SERPL-SCNC: 3.7 MMOL/L — SIGNIFICANT CHANGE UP (ref 3.5–5.3)
POTASSIUM SERPL-SCNC: 4 MMOL/L — SIGNIFICANT CHANGE UP (ref 3.5–5.3)
POTASSIUM SERPL-SCNC: 4.5 MMOL/L — SIGNIFICANT CHANGE UP (ref 3.5–5.3)
PROT SERPL-MCNC: 5.1 G/DL — LOW (ref 6–8.3)
PROT SERPL-MCNC: 5.1 G/DL — LOW (ref 6–8.3)
PROT SERPL-MCNC: 5.3 G/DL — LOW (ref 6–8.3)
PROTHROM AB SERPL-ACNC: 17.8 SEC — HIGH (ref 10–12.9)
PROTHROM AB SERPL-ACNC: 23.9 SEC — HIGH (ref 10–12.9)
RBC # BLD: 3.55 M/UL — LOW (ref 4.2–5.8)
RBC # BLD: 3.8 M/UL — LOW (ref 4.2–5.8)
RBC # BLD: 4.31 M/UL — SIGNIFICANT CHANGE UP (ref 4.2–5.8)
RBC # FLD: 14.2 % — SIGNIFICANT CHANGE UP (ref 10.3–14.5)
RBC # FLD: 14.2 % — SIGNIFICANT CHANGE UP (ref 10.3–14.5)
RBC # FLD: 14.6 % — HIGH (ref 10.3–14.5)
RH IG SCN BLD-IMP: POSITIVE — SIGNIFICANT CHANGE UP
SODIUM SERPL-SCNC: 138 MMOL/L — SIGNIFICANT CHANGE UP (ref 135–145)
SODIUM SERPL-SCNC: 142 MMOL/L — SIGNIFICANT CHANGE UP (ref 135–145)
SODIUM SERPL-SCNC: 142 MMOL/L — SIGNIFICANT CHANGE UP (ref 135–145)
TROPONIN T, HIGH SENSITIVITY RESULT: 48 NG/L — SIGNIFICANT CHANGE UP (ref 0–51)
WBC # BLD: 14.21 K/UL — HIGH (ref 3.8–10.5)
WBC # BLD: 7.15 K/UL — SIGNIFICANT CHANGE UP (ref 3.8–10.5)
WBC # BLD: 8.66 K/UL — SIGNIFICANT CHANGE UP (ref 3.8–10.5)
WBC # FLD AUTO: 14.21 K/UL — HIGH (ref 3.8–10.5)
WBC # FLD AUTO: 7.15 K/UL — SIGNIFICANT CHANGE UP (ref 3.8–10.5)
WBC # FLD AUTO: 8.66 K/UL — SIGNIFICANT CHANGE UP (ref 3.8–10.5)

## 2019-12-08 PROCEDURE — 93010 ELECTROCARDIOGRAM REPORT: CPT

## 2019-12-08 PROCEDURE — 71045 X-RAY EXAM CHEST 1 VIEW: CPT | Mod: 26

## 2019-12-08 PROCEDURE — 99291 CRITICAL CARE FIRST HOUR: CPT

## 2019-12-08 RX ORDER — PANTOPRAZOLE SODIUM 20 MG/1
40 TABLET, DELAYED RELEASE ORAL EVERY 12 HOURS
Refills: 0 | Status: DISCONTINUED | OUTPATIENT
Start: 2019-12-08 | End: 2019-12-10

## 2019-12-08 RX ORDER — PHYTONADIONE (VIT K1) 5 MG
10 TABLET ORAL ONCE
Refills: 0 | Status: COMPLETED | OUTPATIENT
Start: 2019-12-08 | End: 2019-12-08

## 2019-12-08 RX ORDER — CALCIUM GLUCONATE 100 MG/ML
2 VIAL (ML) INTRAVENOUS ONCE
Refills: 0 | Status: COMPLETED | OUTPATIENT
Start: 2019-12-08 | End: 2019-12-08

## 2019-12-08 RX ORDER — SODIUM CHLORIDE 9 MG/ML
1000 INJECTION, SOLUTION INTRAVENOUS
Refills: 0 | Status: DISCONTINUED | OUTPATIENT
Start: 2019-12-08 | End: 2019-12-09

## 2019-12-08 RX ORDER — DEXTROSE 50 % IN WATER 50 %
50 SYRINGE (ML) INTRAVENOUS ONCE
Refills: 0 | Status: COMPLETED | OUTPATIENT
Start: 2019-12-08 | End: 2019-12-08

## 2019-12-08 RX ORDER — PANTOPRAZOLE SODIUM 20 MG/1
40 TABLET, DELAYED RELEASE ORAL DAILY
Refills: 0 | Status: DISCONTINUED | OUTPATIENT
Start: 2019-12-08 | End: 2019-12-08

## 2019-12-08 RX ORDER — PHENYLEPHRINE HYDROCHLORIDE 10 MG/ML
0.1 INJECTION INTRAVENOUS
Qty: 40 | Refills: 0 | Status: DISCONTINUED | OUTPATIENT
Start: 2019-12-08 | End: 2019-12-08

## 2019-12-08 RX ORDER — CHLORHEXIDINE GLUCONATE 213 G/1000ML
15 SOLUTION TOPICAL EVERY 12 HOURS
Refills: 0 | Status: DISCONTINUED | OUTPATIENT
Start: 2019-12-08 | End: 2019-12-09

## 2019-12-08 RX ORDER — VASOPRESSIN 20 [USP'U]/ML
0.03 INJECTION INTRAVENOUS
Qty: 50 | Refills: 0 | Status: DISCONTINUED | OUTPATIENT
Start: 2019-12-08 | End: 2019-12-10

## 2019-12-08 RX ORDER — ENOXAPARIN SODIUM 100 MG/ML
40 INJECTION SUBCUTANEOUS EVERY 24 HOURS
Refills: 0 | Status: DISCONTINUED | OUTPATIENT
Start: 2019-12-08 | End: 2019-12-10

## 2019-12-08 RX ORDER — DEXMEDETOMIDINE HYDROCHLORIDE IN 0.9% SODIUM CHLORIDE 4 UG/ML
0.2 INJECTION INTRAVENOUS
Qty: 200 | Refills: 0 | Status: DISCONTINUED | OUTPATIENT
Start: 2019-12-08 | End: 2019-12-10

## 2019-12-08 RX ORDER — SODIUM CHLORIDE 9 MG/ML
1000 INJECTION, SOLUTION INTRAVENOUS ONCE
Refills: 0 | Status: COMPLETED | OUTPATIENT
Start: 2019-12-08 | End: 2019-12-08

## 2019-12-08 RX ORDER — ALBUMIN HUMAN 25 %
250 VIAL (ML) INTRAVENOUS ONCE
Refills: 0 | Status: COMPLETED | OUTPATIENT
Start: 2019-12-08 | End: 2019-12-08

## 2019-12-08 RX ORDER — PHENYLEPHRINE HYDROCHLORIDE 10 MG/ML
0.1 INJECTION INTRAVENOUS
Qty: 40 | Refills: 0 | Status: DISCONTINUED | OUTPATIENT
Start: 2019-12-08 | End: 2019-12-09

## 2019-12-08 RX ORDER — FENTANYL CITRATE 50 UG/ML
1 INJECTION INTRAVENOUS
Qty: 5000 | Refills: 0 | Status: DISCONTINUED | OUTPATIENT
Start: 2019-12-08 | End: 2019-12-10

## 2019-12-08 RX ORDER — SODIUM CHLORIDE 9 MG/ML
1000 INJECTION, SOLUTION INTRAVENOUS
Refills: 0 | Status: DISCONTINUED | OUTPATIENT
Start: 2019-12-08 | End: 2019-12-08

## 2019-12-08 RX ORDER — PHENYLEPHRINE HYDROCHLORIDE 10 MG/ML
0.5 INJECTION INTRAVENOUS
Qty: 40 | Refills: 0 | Status: DISCONTINUED | OUTPATIENT
Start: 2019-12-08 | End: 2019-12-08

## 2019-12-08 RX ORDER — POTASSIUM CHLORIDE 20 MEQ
10 PACKET (EA) ORAL
Refills: 0 | Status: COMPLETED | OUTPATIENT
Start: 2019-12-08 | End: 2019-12-08

## 2019-12-08 RX ORDER — HYDROMORPHONE HYDROCHLORIDE 2 MG/ML
0.5 INJECTION INTRAMUSCULAR; INTRAVENOUS; SUBCUTANEOUS
Refills: 0 | Status: DISCONTINUED | OUTPATIENT
Start: 2019-12-08 | End: 2019-12-09

## 2019-12-08 RX ORDER — PIPERACILLIN AND TAZOBACTAM 4; .5 G/20ML; G/20ML
3.38 INJECTION, POWDER, LYOPHILIZED, FOR SOLUTION INTRAVENOUS EVERY 8 HOURS
Refills: 0 | Status: DISCONTINUED | OUTPATIENT
Start: 2019-12-08 | End: 2019-12-10

## 2019-12-08 RX ADMIN — Medication 3 MILLILITER(S): at 11:53

## 2019-12-08 RX ADMIN — VASOPRESSIN 1.8 UNIT(S)/MIN: 20 INJECTION INTRAVENOUS at 20:24

## 2019-12-08 RX ADMIN — PHENYLEPHRINE HYDROCHLORIDE 2.03 MICROGRAM(S)/KG/MIN: 10 INJECTION INTRAVENOUS at 20:25

## 2019-12-08 RX ADMIN — CHLORHEXIDINE GLUCONATE 15 MILLILITER(S): 213 SOLUTION TOPICAL at 03:00

## 2019-12-08 RX ADMIN — CHLORHEXIDINE GLUCONATE 15 MILLILITER(S): 213 SOLUTION TOPICAL at 05:55

## 2019-12-08 RX ADMIN — SODIUM CHLORIDE 4000 MILLILITER(S): 9 INJECTION, SOLUTION INTRAVENOUS at 05:55

## 2019-12-08 RX ADMIN — PANTOPRAZOLE SODIUM 40 MILLIGRAM(S): 20 TABLET, DELAYED RELEASE ORAL at 06:50

## 2019-12-08 RX ADMIN — Medication 3 MILLILITER(S): at 00:00

## 2019-12-08 RX ADMIN — PIPERACILLIN AND TAZOBACTAM 25 GRAM(S): 4; .5 INJECTION, POWDER, LYOPHILIZED, FOR SOLUTION INTRAVENOUS at 22:35

## 2019-12-08 RX ADMIN — DEXMEDETOMIDINE HYDROCHLORIDE IN 0.9% SODIUM CHLORIDE 2.71 MICROGRAM(S)/KG/HR: 4 INJECTION INTRAVENOUS at 20:25

## 2019-12-08 RX ADMIN — DEXMEDETOMIDINE HYDROCHLORIDE IN 0.9% SODIUM CHLORIDE 2.71 MICROGRAM(S)/KG/HR: 4 INJECTION INTRAVENOUS at 08:28

## 2019-12-08 RX ADMIN — Medication 3 MILLILITER(S): at 06:08

## 2019-12-08 RX ADMIN — Medication 100 MILLIEQUIVALENT(S): at 15:31

## 2019-12-08 RX ADMIN — CHLORHEXIDINE GLUCONATE 15 MILLILITER(S): 213 SOLUTION TOPICAL at 15:15

## 2019-12-08 RX ADMIN — HYDROMORPHONE HYDROCHLORIDE 0.5 MILLIGRAM(S): 2 INJECTION INTRAMUSCULAR; INTRAVENOUS; SUBCUTANEOUS at 21:21

## 2019-12-08 RX ADMIN — Medication 750 MILLILITER(S): at 03:15

## 2019-12-08 RX ADMIN — PHENYLEPHRINE HYDROCHLORIDE 10.16 MICROGRAM(S)/KG/MIN: 10 INJECTION INTRAVENOUS at 08:29

## 2019-12-08 RX ADMIN — FENTANYL CITRATE 2.71 MICROGRAM(S)/KG/HR: 50 INJECTION INTRAVENOUS at 20:25

## 2019-12-08 RX ADMIN — PIPERACILLIN AND TAZOBACTAM 25 GRAM(S): 4; .5 INJECTION, POWDER, LYOPHILIZED, FOR SOLUTION INTRAVENOUS at 06:50

## 2019-12-08 RX ADMIN — HYDROMORPHONE HYDROCHLORIDE 0.5 MILLIGRAM(S): 2 INJECTION INTRAMUSCULAR; INTRAVENOUS; SUBCUTANEOUS at 21:40

## 2019-12-08 RX ADMIN — VASOPRESSIN 1.8 UNIT(S)/MIN: 20 INJECTION INTRAVENOUS at 08:29

## 2019-12-08 RX ADMIN — Medication 102 MILLIGRAM(S): at 08:55

## 2019-12-08 RX ADMIN — FENTANYL CITRATE 2.71 MICROGRAM(S)/KG/HR: 50 INJECTION INTRAVENOUS at 08:29

## 2019-12-08 RX ADMIN — Medication 1 MILLIGRAM(S): at 03:15

## 2019-12-08 RX ADMIN — PANTOPRAZOLE SODIUM 40 MILLIGRAM(S): 20 TABLET, DELAYED RELEASE ORAL at 20:24

## 2019-12-08 RX ADMIN — CHLORHEXIDINE GLUCONATE 1 APPLICATION(S): 213 SOLUTION TOPICAL at 05:55

## 2019-12-08 RX ADMIN — Medication 200 GRAM(S): at 15:31

## 2019-12-08 RX ADMIN — PANTOPRAZOLE SODIUM 40 MILLIGRAM(S): 20 TABLET, DELAYED RELEASE ORAL at 11:44

## 2019-12-08 RX ADMIN — SODIUM CHLORIDE 100 MILLILITER(S): 9 INJECTION, SOLUTION INTRAVENOUS at 20:26

## 2019-12-08 RX ADMIN — Medication 50 MILLILITER(S): at 03:15

## 2019-12-08 RX ADMIN — SODIUM CHLORIDE 75 MILLILITER(S): 9 INJECTION, SOLUTION INTRAVENOUS at 08:29

## 2019-12-08 NOTE — PROGRESS NOTE ADULT - SUBJECTIVE AND OBJECTIVE BOX
GENERAL SURGERY DAILY PROGRESS NOTE:    Interval:  24 HOUR EVENTS:  -pressor requirements labile overnight  -tachycardic to 120s-140s overnight. Towards end of night went down to 80s.   -cardiac workup sent which showed trop of 48  -received total of 1.5L normal saline during day, 250cc 5% albumin overnight and 1L NS overnight  -lactate cleared, most recently 2  -Cr improving  -plan for OR today for possible bowel resection, possible closure    Subjective:  Incomplete note  Patient seen and examined this am.     ICU Vital Signs Last 24 Hrs  T(C): 37.3 (08 Dec 2019 03:00), Max: 37.3 (07 Dec 2019 11:00)  T(F): 99.1 (08 Dec 2019 03:00), Max: 99.1 (07 Dec 2019 11:00)  HR: 83 (08 Dec 2019 07:00) (77 - 143)  BP: 95/55 (08 Dec 2019 03:00) (88/52 - 132/66)  BP(mean): 69 (08 Dec 2019 03:00) (64 - 93)  ABP: 121/53 (08 Dec 2019 07:00) (77/45 - 148/62)  ABP(mean): 79 (08 Dec 2019 07:00) (57 - 94)  RR: 25 (08 Dec 2019 07:00) (22 - 29)  SpO2: 99% (08 Dec 2019 07:00) (96% - 100%)    Exam:  Incomplete note  Gen: NAD, resting in bed  Abd: Soft, ND, NTTP x 4 quadrants, no rebound or guarding. VAC  Ext: No edema, WWP    LABS:                        13.1   14.21 )-----------( 168      ( 08 Dec 2019 02:36 )             39.6     12-08    142  |  109<H>  |  43<H>  ----------------------------<  69<L>  4.5   |  16<L>  |  1.61<H>    Ca    7.8<L>      08 Dec 2019 02:46  Phos  3.9     12-08  Mg     3.0     12-08    TPro  5.3<L>  /  Alb  2.6<L>  /  TBili  0.6  /  DBili  0.3<H>  /  AST  53<H>  /  ALT  26  /  AlkPhos  32<L>  12-08    YAMILET Appiah, PGY-1  Red Team Surgery  [id number] with any questions GENERAL SURGERY DAILY PROGRESS NOTE:    Interval:  24 HOUR EVENTS:  -pressor requirements labile overnight  -tachycardic to 120s-140s overnight. Towards end of night went down to 80s.   -cardiac workup sent which showed trop of 48  -received total of 1.5L normal saline during day, 250cc 5% albumin overnight and 1L NS overnight  -lactate cleared, most recently 2  - Now weaned off pressors.  -Cr improving  -plan for OR today for possible bowel resection, possible closure    Subjective:  No acute events overnight  Patient seen and examined this am.   Intubated    ICU Vital Signs Last 24 Hrs  T(C): 37.3 (08 Dec 2019 07:00), Max: 37.3 (08 Dec 2019 03:00)  T(F): 99.1 (08 Dec 2019 07:00), Max: 99.1 (08 Dec 2019 03:00)  HR: 74 (08 Dec 2019 10:30) (74 - 143)  BP: 99/55 (08 Dec 2019 08:15) (88/52 - 133/67)  BP(mean): 73 (08 Dec 2019 08:15) (64 - 94)  ABP: 110/52 (08 Dec 2019 10:30) (77/45 - 148/62)  ABP(mean): 75 (08 Dec 2019 10:30) (57 - 94)  RR: 22 (08 Dec 2019 10:30) (22 - 29)  SpO2: 99% (08 Dec 2019 10:30) (94% - 100%)      Exam:  Gen: NAD, resting in bed  Abd: Soft, ND, TTP x 4 quadrants, Rebound tenderness. VAC in place. No erythema/induration.   Ext: No edema, WWP    LABS:                        13.1   14.21 )-----------( 168      ( 08 Dec 2019 02:36 )             39.6     12-08    142  |  109<H>  |  43<H>  ----------------------------<  69<L>  4.5   |  16<L>  |  1.61<H>    Ca    7.8<L>      08 Dec 2019 02:46  Phos  3.9     12-08  Mg     3.0     12-08    TPro  5.3<L>  /  Alb  2.6<L>  /  TBili  0.6  /  DBili  0.3<H>  /  AST  53<H>  /  ALT  26  /  AlkPhos  32<L>  12-08    YAMILET Appiah, PGY-1  Red Team Surgery  [id number] with any questions GENERAL SURGERY DAILY PROGRESS NOTE:    Interval:  24 HOUR EVENTS:  -pressor requirements labile overnight  -tachycardic to 120s-140s overnight. Towards end of night went down to 80s.   -cardiac workup sent which showed trop of 48  -received total of 1.5L normal saline during day, 250cc 5% albumin overnight and 1L NS overnight  -lactate cleared, most recently 2  - Now weaned off pressors.  -Cr improving  -plan for OR today for xlap, washout, possible bowel resection, possible closure    Subjective:  No acute events overnight  Patient seen and examined this am.   Intubated    ICU Vital Signs Last 24 Hrs  T(C): 37.3 (08 Dec 2019 07:00), Max: 37.3 (08 Dec 2019 03:00)  T(F): 99.1 (08 Dec 2019 07:00), Max: 99.1 (08 Dec 2019 03:00)  HR: 74 (08 Dec 2019 10:30) (74 - 143)  BP: 99/55 (08 Dec 2019 08:15) (88/52 - 133/67)  BP(mean): 73 (08 Dec 2019 08:15) (64 - 94)  ABP: 110/52 (08 Dec 2019 10:30) (77/45 - 148/62)  ABP(mean): 75 (08 Dec 2019 10:30) (57 - 94)  RR: 22 (08 Dec 2019 10:30) (22 - 29)  SpO2: 99% (08 Dec 2019 10:30) (94% - 100%)      Exam:  Gen: NAD, resting in bed  Abd: Soft, ND, TTP x 4 quadrants, Rebound tenderness. VAC in place. No erythema/induration.   Ext: No edema, WWP    LABS:                        13.1   14.21 )-----------( 168      ( 08 Dec 2019 02:36 )             39.6     12-08    142  |  109<H>  |  43<H>  ----------------------------<  69<L>  4.5   |  16<L>  |  1.61<H>    Ca    7.8<L>      08 Dec 2019 02:46  Phos  3.9     12-08  Mg     3.0     12-08    TPro  5.3<L>  /  Alb  2.6<L>  /  TBili  0.6  /  DBili  0.3<H>  /  AST  53<H>  /  ALT  26  /  AlkPhos  32<L>  12-08    YAMILET Appiah, PGY-1  Red Team Surgery  [id number] with any questions

## 2019-12-08 NOTE — CONSULT NOTE ADULT - PROBLEM SELECTOR RECOMMENDATION 9
POD2 s/p ex lap, MIRYAM, closure of enterotomy, abthera vac placement  plan for RTOR today for washout   Orders per SICU and surgery team

## 2019-12-08 NOTE — PROVIDER CONTACT NOTE (CHANGE IN STATUS NOTIFICATION) - ASSESSMENT
sinus tachy. hypotensive (on norepinephrine and vasopressin) intubated and sedated. afebrile. calm. urine output WDL.

## 2019-12-08 NOTE — PROGRESS NOTE ADULT - ATTENDING COMMENTS
Pt seen and examined with residents and PA, agree with above.    1. Septic shock from adhesive SBO s/p ex lap, MIRYAM, intentional enterotomy for drainage of 1L succus, ABThera, with CHI stage 3 versus CKD of unknown severity (baseline creatinine unknown):  - Went back to OR today for second look  - Patchy areas of ischemia, ABThera replaced, will need to return to OR in several days  - Off juan carlos this morning (switched from levo due to tachycardia), back on postop, continues to require vasopressin as well  - Will continue to monitor hemodynamic status, MAP goal >65  - Urine output acceptable (30-40/hr)  - Arterial lactate slightly elevated to 2.2 postop, will recheck  - SVV 11, CVP 8  - Due to risks associated with fluid overload, including inability to close the abdomen and wean from the vent, will hold off on fluid bolus for now, will try to minimize additional fluids unless there is a change in the clinical picture  - Follow up with surgical team regarding need for antibiotics?  - CHI is resolving, avoid nephrotoxic medications, monitor creatinine daily    2. Coagulopathy:  - Likely due to sepsis and malnutrition  - INR decreased to 1.5 preop after FFP and vitamin K  - Start VTE prophylaxis    3. Severe protein-calorie malnutrition:  - Would not wait full 7 days before considering TPN given underlying malnutrition present on admission  - TPN    Critical care time: 45 minutes

## 2019-12-08 NOTE — CONSULT NOTE ADULT - SUBJECTIVE AND OBJECTIVE BOX
CHIEF COMPLAINT:    HISTORY OF PRESENT ILLNESS: 74 year old male with a past medical history of COPD and EtOH dependence who presented today with abdominal pain, nausea, hematemesis, and poor PO intake for ~2-3 days. Labs significant for an CHI w/ Cr 2.74 and lactate of 9.4. He was also notably tachycardic to the 110s and hypotensive w/ SBP in the 70s. He was given 2 units of PRBCs and 2 L of LR in the ED due to concern for upper GI bleeding. Imaging revealed high grade SBO in the RLQ. Patient was taken to the OR emergently for an exploratory laparotomy, lysis of adhesions, decompression of bowel via enterotomy w/ primary repair, and Abthera VAC placement. Of note, the distal 50% of the bowel appeared dusky but viable. He required vasopressor support with phenylephrine and vasopressin infusions. He received 3000 mL of crystalloid w/ EBL of 10 mL and UOP of 25 mL. Patient was left intubated  and brought to ICU.   Plan for RTOR today 12/8 for washout.       PAST MEDICAL & SURGICAL HISTORY:  COPD with hypoxia  ETOHism  ETOH abuse  History of lumbosacral spine surgery  History of prostate surgery  History of appendectomy          MEDICATIONS:  phenylephrine    Infusion 0.1 MICROgram(s)/kG/Min IV Continuous <Continuous>    piperacillin/tazobactam IVPB.. 3.375 Gram(s) IV Intermittent every 12 hours    albuterol/ipratropium for Nebulization 3 milliLiter(s) Nebulizer every 6 hours    dexMEDEtomidine Infusion 0.2 MICROgram(s)/kG/Hr IV Continuous <Continuous>  fentaNYL   Infusion. 1 MICROgram(s)/kG/Hr IV Continuous <Continuous>    pantoprazole  Injectable 40 milliGRAM(s) IV Push daily    vasopressin Infusion 0.03 Unit(s)/Min IV Continuous <Continuous>    chlorhexidine 0.12% Liquid 15 milliLiter(s) Oral Mucosa <User Schedule>  chlorhexidine 2% Cloths 1 Application(s) Topical <User Schedule>  dextrose 5% + lactated ringers. 1000 milliLiter(s) IV Continuous <Continuous>      FAMILY HISTORY:      SOCIAL HISTORY:    [ ] Non-smoker  [ ] Smoker  [ ] Alcohol    Allergies    No Known Allergies    Intolerances    	    REVIEW OF SYSTEMS:  CONSTITUTIONAL: No fever, weight loss, +fatigue  EYES: No eye pain, visual disturbances, or discharge  ENMT:  No difficulty hearing, tinnitus, vertigo; No sinus or throat pain  NECK: No pain or stiffness  RESPIRATORY: No cough, wheezing, chills or hemoptysis; No Shortness of Breath  CARDIOVASCULAR: No chest pain, palpitations, passing out, dizziness, or leg swelling  GASTROINTESTINAL: +abdominal or epigastric pain. No nausea, vomiting, or hematemesis; No diarrhea or constipation. No melena or hematochezia.  GENITOURINARY: No dysuria, frequency, hematuria, or incontinence  NEUROLOGICAL: No headaches, memory loss, loss of strength, numbness, or tremors  SKIN: No itching, burning, rashes, or lesions   LYMPH Nodes: No enlarged glands  ENDOCRINE: No heat or cold intolerance; No hair loss  MUSCULOSKELETAL: No joint pain or swelling; No muscle, back, or extremity pain  PSYCHIATRIC: No depression, anxiety, mood swings, or difficulty sleeping  HEME/LYMPH: No easy bruising, or bleeding gums  ALLERY AND IMMUNOLOGIC: No hives or eczema	    [ ] All others negative	  [ ] Unable to obtain    PHYSICAL EXAM:  T(C): 37.3 (12-08-19 @ 15:00), Max: 37.3 (12-08-19 @ 03:00)  HR: 70 (12-08-19 @ 16:45) (70 - 143)  BP: 124/69 (12-08-19 @ 16:00) (88/55 - 133/67)  RR: 22 (12-08-19 @ 16:45) (22 - 29)  SpO2: 99% (12-08-19 @ 16:45) (93% - 100%)  Wt(kg): --  I&O's Summary    07 Dec 2019 07:01  -  08 Dec 2019 07:00  --------------------------------------------------------  IN: 4322 mL / OUT: 2205 mL / NET: 2117 mL    08 Dec 2019 07:01  -  08 Dec 2019 17:17  --------------------------------------------------------  IN: 1820.6 mL / OUT: 1170 mL / NET: 650.6 mL        Appearance: NAD  HEENT:   Normal oral mucosa, PERRL, EOMI	  Lymphatic: No lymphadenopathy  Cardiovascular: Normal S1 S2, No JVD, No murmurs, No edema  Respiratory: Lungs clear to auscultation	  Psychiatry: A & O x 3, Mood & affect appropriate  Gastrointestinal:  Soft, ND, TTP x 4 quadrants, Rebound tenderness. VAC in place. No erythema/induration.   Skin: No rashes, No ecchymoses, No cyanosis	  Neurologic: Non-focal  Extremities: Normal range of motion, No clubbing, cyanosis or edema  Vascular: Peripheral pulses palpable 2+ bilaterally    TELEMETRY: 	SR    ECG:  Sinus tach, PVCs, LAFB    < from: Transthoracic Echocardiogram (12.07.19 @ 06:40) >    Patient name: GAURAV WILLIS  YOB: 1945   Age: 74 (M)   MR#: [id number]  Study Date: 12/7/2019  Location: [address] [place], [place] [id number]: Brooklynn Pike RDCS  Study quality: Technically difficult due to p  Referring Physician: Irwin Siegel MD  Blood Pressure: 118/61 mmHg  Height: 175 cm  Weight: 54 kg  BSA: 1.7 m2  ------------------------------------------------------------------------  PROCEDURE: Transthoracic echocardiogram with 2-D, M-Mode  and complete spectral and color flow Doppler.  INDICATION: Shock, unspecified (R57.9)  ------------------------------------------------------------------------  Dimensions:    Normal Values:  LA:     2.3    2.0 - 4.0 cm  Ao:            2.0 - 3.8 cm  SEPTUM: 1.0    0.6 - 1.2 cm  PWT:    0.8 0.6 - 1.1 cm  LVIDd:  4.4    3.0 - 5.6 cm  LVIDs:  3.1    1.8 - 4.0 cm  Derived variables:  LVMI: 77 g/m2  RWT: 0.36  Fractional short: 30 %  ------------------------------------------------------------------------  Observations:  Mitral Valve: Normal mitral valve.  Aortic Valve/Aorta: Aortic valve not well visualized;  appears calcified.  Normal aortic root.  Left Atrium: Normal left atrium.  Left Ventricle: Endocardium not well visualized; at least  mild to moderate left ventricular dysfunction. Grossly  normal left ventricular internal dimensions and wall  thicknesses.  Right Heart: Right atrial enlargement. Right ventricular  enlargement with decreased right ventricular systolic  function. Normal tricuspid valve.  Pericardium/Pleura: Normal pericardium with no pericardial  effusion.  ------------------------------------------------------------------------  Conclusions:  1. Grossly normal left ventricular internal dimensions and  wall thicknesses.  2. Endocardium not well visualized; at least mild to  moderate left ventricular dysfunction.  3. Right atrial enlargement.  4. Right ventricular enlargement with decreased right  ventricular systolic function.  All imaging performed from subcostal views.  No parasternal  or apical imaging.  Doppler exam very limited.  *** No previous Echo exam.  ------------------------------------------------------------------------  Confirmed on  12/7/2019 - 13:45:06 by Jya Robbins M.D.  ------------------------------------------------------------------------    < end of copied text >  	  RADIOLOGY:  < from: CT Abdomen and Pelvis No Cont (12.06.19 @ 16:48) >    EXAM:  CT ABDOMEN AND PELVIS                            PROCEDURE DATE:  12/06/2019            INTERPRETATION:  CLINICAL INFORMATION: Abdominal pain. Upper GI bleed.    COMPARISON: None.    PROCEDURE:   CT of the Abdomen and Pelvis was performed without intravenous contrast.   Intravenous contrast: None.  Oral contrast: None.  Sagittal and coronal reformats were performed.    FINDINGS:    LOWER CHEST: Within normal limits.    LIVER: Within normal limits.  BILE DUCTS: Normal caliber.  GALLBLADDER: Within normal limits.  SPLEEN: Within normal limits.  PANCREAS: Within normal limits.  ADRENALS: Within normal limits.  KIDNEYS/URETERS: No hydronephrosis. Nonobstructing bilateral renal   calculi.    BLADDER: Within normal limits.  REPRODUCTIVE ORGANS: Prostatectomy.    BOWEL: High-grade small bowel obstruction with transition point in the   right lower abdomen (series 602, image 34).  PERITONEUM: No ascites.  VESSELS: Atherosclerotic changes.  RETROPERITONEUM/LYMPH NODES: No lymphadenopathy.  ABDOMINAL WALL: Within normal limits.  BONES: Degenerative changes.    IMPRESSION:     High-grade small bowel obstruction with transition point in the right   lower quadrant.                    NAYA TONG M.D., ATTENDING RADIOLOGIST  This documenthas been electronically signed. Dec  6 2019  5:00PM                < end of copied text >    OTHER: 	  	  LABS:	 	    CARDIAC MARKERS:      Troponin T, High Sensitivity (12.08.19 @ 02:46)    Troponin T, High Sensitivity Result: 48: Rapid upward or downward changes in high-sensitivity troponin levels  suggest acute myocardial injury. Renal impairment may cause sustained  troponin elevations.  Normal: <6 - 14 ng/L  Indeterminate: 15-51 ng/L  Elevated: > 51 ng/L  See [email or web address]/test/TROPTHS on the [employer] for more  information ng/L                                10.8   7.15  )-----------( 116      ( 08 Dec 2019 10:52 )             31.7     12-08    138  |  107  |  36<H>  ----------------------------<  148<H>  3.7   |  21<L>  |  1.29    Ca    7.2<L>      08 Dec 2019 10:52  Phos  2.9     12-08  Mg     3.0     12-08    TPro  5.1<L>  /  Alb  2.7<L>  /  TBili  0.7  /  DBili  0.3<H>  /  AST  51<H>  /  ALT  26  /  AlkPhos  41  12-08    proBNP:   Lipid Profile:   HgA1c:   TSH:

## 2019-12-08 NOTE — PROGRESS NOTE ADULT - SUBJECTIVE AND OBJECTIVE BOX
HISTORY  74 year old male with a past medical history of COPD and EtOH dependence who presented today with abdominal pain, nausea, hematemesis, and poor PO intake for ~2-3 days. Labs significant for an CHI w/ Cr 2.74 and lactate of 9.4. He was also notably tachycardic to the 110s and hypotensive w/ SBP in the 70s. He was given 2 units of PRBCs and 2 L of LR in the ED due to concern for upper GI bleeding. Imaging revealed high grade SBO in the RLQ. Patient was taken to the OR emergently for an exploratory laparotomy, lysis of adhesions, decompression of bowel via enterotomy w/ primary repair, and Abthera VAC placement. Of note, the distal 50% of the bowel appeared dusky but viable. He required vasopressor support with phenylephrine and vasopressin infusions. He received 3000 mL of crystalloid w/ EBL of 10 mL and UOP of 25 mL. Patient was left intubated at the end of the case so SICU was consulted for hemodynamic monitoring.    Of note, patient does not seem to have sought medical care in some time.    Unable to obtain HPI, history, or ROS from patient as he is sedated and intubated. History obtained from chart.    24 HOUR EVENTS:  -pressor requirements labile overnight  -tachycardic to 120  -received total of 1.5L normal saline during day, 250cc 5% albumin overnight  -lactate cleared, most recently 2  -Cr improving  -plan for OR today for possible bowel resection, possible closure    SUBJECTIVE/ROS:  [ ] A ten-point review of systems was otherwise negative except as noted.  [ ] Due to altered mental status/intubation, subjective information were not able to be obtained from the patient. History was obtained, to the extent possible, from review of the chart and collateral sources of information.      NEURO  Exam: intubated, sedated, responding to commands  Meds: dexMEDEtomidine Infusion 0.2 MICROgram(s)/kG/Hr IV Continuous <Continuous>  fentaNYL   Infusion. 1 MICROgram(s)/kG/Hr IV Continuous <Continuous>  LORazepam   Injectable 1 milliGRAM(s) IV Push once    [x] Adequacy of sedation and pain control has been assessed and adjusted      RESPIRATORY  RR: 23 (12-07-19 @ 22:00) (22 - 26)  SpO2: 97% (12-08-19 @ 00:04) (97% - 100%)  Wt(kg): --  Exam: unlabored, clear to auscultation bilaterally  Mechanical Ventilation: Mode: AC/ CMV (Assist Control/ Continuous Mandatory Ventilation), RR (machine): 22, RR (patient): 28, TV (machine): 450, FiO2: 40, PEEP: 5, ITime: 1, MAP: 9, PIP: 16  ABG - ( 07 Dec 2019 18:47 )  pH: 7.35  /  pCO2: 38    /  pO2: 118   / HCO3: 20    / Base Excess: -4.6  /  SaO2: 99      Lactate: x                [N/A] Extubation Readiness Assessed  Meds: albuterol/ipratropium for Nebulization 3 milliLiter(s) Nebulizer every 6 hours        CARDIOVASCULAR  HR: 97 (12-08-19 @ 00:04) (84 - 106)  BP: 90/54 (12-07-19 @ 15:45) (88/52 - 132/66)  BP(mean): 66 (12-07-19 @ 15:45) (64 - 93)  ABP: 102/51 (12-07-19 @ 22:00) (80/47 - 132/62)  ABP(mean): 69 (12-07-19 @ 22:00) (60 - 88)  Wt(kg): --  CVP(cm H2O): --  VBG - ( 06 Dec 2019 15:29 )  pH: 7.27  /  pCO2: 67    /  pO2: <20   / HCO3: 29    / Base Excess: 0.4   /  SaO2: 13     Lactate: 9.4                Exam: regular rate and rhythm  Cardiac Rhythm: sinus  Perfusion     [x]Adequate   [ ]Inadequate  Mentation   [x]Normal       [ ]Reduced  Extremities  [x]Warm         [ ]Cool  Volume Status [ ]Hypervolemic [x]Euvolemic [ ]Hypovolemic  Meds: norepinephrine Infusion 0.05 MICROgram(s)/kG/Min IV Continuous <Continuous>        GI/NUTRITION  Exam: soft, nontender, nondistended, incision C/D/I  Diet: NPO/NGT  Meds: pantoprazole  Injectable 40 milliGRAM(s) IV Push every 12 hours      GENITOURINARY  I&O's Detail    12-06 @ 07:01  -  12-07 @ 07:00  --------------------------------------------------------  IN:    Albumin 5%  - 250 mL: 500 mL    dexmedetomidine Infusion: 73.1 mL    fentaNYL Infusion.: 24.3 mL    IV PiggyBack: 350 mL    norepinephrine Infusion: 126 mL    sodium chloride 0.9%: 800 mL    Sodium Chloride 0.9% IV Bolus: 1000 mL    sodium chloride 0.9%.: 75 mL    Solution: 50 mL    vasopressin Infusion: 16.2 mL  Total IN: 3014.6 mL    OUT:    Indwelling Catheter - Urethral: 325 mL    Nasoenteral Tube: 200 mL    VAC (Vacuum Assisted Closure) System: 500 mL  Total OUT: 1025 mL    Total NET: 1989.6 mL      12-07 @ 07:01  -  12-08 @ 01:32  --------------------------------------------------------  IN:    dexmedetomidine Infusion: 183 mL    Enteral Tube Flush: 80 mL    fentaNYL Infusion.: 40.5 mL    IV PiggyBack: 50 mL    norepinephrine Infusion: 246 mL    Sodium Chloride 0.9% IV Bolus: 1500 mL    sodium chloride 0.9%.: 1125 mL    vasopressin Infusion: 21.6 mL  Total IN: 3246.1 mL    OUT:    Indwelling Catheter - Urethral: 705 mL    Nasoenteral Tube: 350 mL    VAC (Vacuum Assisted Closure) System: 350 mL  Total OUT: 1405 mL    Total NET: 1841.1 mL          12-07    142  |  108  |  41<H>  ----------------------------<  79  4.7   |  19<L>  |  1.78<H>    Ca    8.4      07 Dec 2019 16:55  Phos  3.6     12-07  Mg     2.3     12-07    TPro  5.1<L>  /  Alb  2.8<L>  /  TBili  0.5  /  DBili  0.3<H>  /  AST  56<H>  /  ALT  24  /  AlkPhos  33<L>  12-07    [ ] Martinez catheter, indication: N/A  Meds: sodium chloride 0.9%. 1000 milliLiter(s) IV Continuous <Continuous>        HEMATOLOGIC  Meds: enoxaparin Injectable 30 milliGRAM(s) SubCutaneous every 24 hours    [x] VTE Prophylaxis                        14.1   12.41 )-----------( 185      ( 07 Dec 2019 16:55 )             40.6     PT/INR - ( 07 Dec 2019 04:39 )   PT: 14.1 sec;   INR: 1.22 ratio         PTT - ( 07 Dec 2019 04:39 )  PTT:31.1 sec  Transfusion     [ ] PRBC   [ ] Platelets   [ ] FFP   [ ] Cryoprecipitate      INFECTIOUS DISEASES  WBC Count: 12.41 K/uL (12-07 @ 16:55)  WBC Count: 12.99 K/uL (12-07 @ 13:22)  WBC Count: 12.78 K/uL (12-07 @ 08:40)  WBC Count: 11.56 K/uL (12-07 @ 04:39)    RECENT CULTURES:  Specimen Source: .Blood Blood  Date/Time: 12-06 @ 22:20  Culture Results:   No growth to date.  Gram Stain: --  Organism: --    Meds: piperacillin/tazobactam IVPB.. 3.375 Gram(s) IV Intermittent every 12 hours        ENDOCRINE  CAPILLARY BLOOD GLUCOSE        Meds: vasopressin Infusion 0.03 Unit(s)/Min IV Continuous <Continuous>        ACCESS DEVICES:  [ ] Peripheral IV  [x] Central Venous Line	[ ] R	[ ] L	[ ] IJ	[ ] Fem	[ ] SC	Placed:   [x] Arterial Line		[ ] R	[ ] L	[ ] Fem	[ ] Rad	[ ] Ax	Placed:   [ ] PICC:					[ ] Mediport  [ ] Urinary Catheter, Date Placed:   [x] Necessity of urinary, arterial, and venous catheters discussed    OTHER MEDICATIONS:  chlorhexidine 0.12% Liquid 15 milliLiter(s) Oral Mucosa <User Schedule>  chlorhexidine 2% Cloths 1 Application(s) Topical <User Schedule>      CODE STATUS: full code      IMAGING: HISTORY  74 year old male with a past medical history of COPD and EtOH dependence who presented today with abdominal pain, nausea, hematemesis, and poor PO intake for ~2-3 days. Labs significant for an CHI w/ Cr 2.74 and lactate of 9.4. He was also notably tachycardic to the 110s and hypotensive w/ SBP in the 70s. He was given 2 units of PRBCs and 2 L of LR in the ED due to concern for upper GI bleeding. Imaging revealed high grade SBO in the RLQ. Patient was taken to the OR emergently for an exploratory laparotomy, lysis of adhesions, decompression of bowel via enterotomy w/ primary repair, and Abthera VAC placement. Of note, the distal 50% of the bowel appeared dusky but viable. He required vasopressor support with phenylephrine and vasopressin infusions. He received 3000 mL of crystalloid w/ EBL of 10 mL and UOP of 25 mL. Patient was left intubated at the end of the case so SICU was consulted for hemodynamic monitoring.    Of note, patient does not seem to have sought medical care in some time.    Unable to obtain HPI, history, or ROS from patient as he is sedated and intubated. History obtained from chart.    24 HOUR EVENTS:  -pressor requirements labile overnight  -tachycardic to 120s-140s overnight. Towards end of night went down to 80s.   -cardiac workup sent which showed trop of 48  -received total of 1.5L normal saline during day, 250cc 5% albumin overnight and 1L NS overnight  -lactate cleared, most recently 2  -Cr improving  -plan for OR today for possible bowel resection, possible closure    SUBJECTIVE/ROS:  [ ] A ten-point review of systems was otherwise negative except as noted.  [ ] Due to altered mental status/intubation, subjective information were not able to be obtained from the patient. History was obtained, to the extent possible, from review of the chart and collateral sources of information.      NEURO  Exam: intubated, sedated, responding to commands  Meds: dexMEDEtomidine Infusion 0.2 MICROgram(s)/kG/Hr IV Continuous <Continuous>  fentaNYL   Infusion. 1 MICROgram(s)/kG/Hr IV Continuous <Continuous>  LORazepam   Injectable 1 milliGRAM(s) IV Push once    [x] Adequacy of sedation and pain control has been assessed and adjusted      RESPIRATORY  RR: 23 (12-07-19 @ 22:00) (22 - 26)  SpO2: 97% (12-08-19 @ 00:04) (97% - 100%)  Wt(kg): --  Exam: unlabored, clear to auscultation bilaterally  Mechanical Ventilation: Mode: AC/ CMV (Assist Control/ Continuous Mandatory Ventilation), RR (machine): 22, RR (patient): 28, TV (machine): 450, FiO2: 40, PEEP: 5, ITime: 1, MAP: 9, PIP: 16  ABG - ( 07 Dec 2019 18:47 )  pH: 7.35  /  pCO2: 38    /  pO2: 118   / HCO3: 20    / Base Excess: -4.6  /  SaO2: 99      Lactate: x                [N/A] Extubation Readiness Assessed  Meds: albuterol/ipratropium for Nebulization 3 milliLiter(s) Nebulizer every 6 hours        CARDIOVASCULAR  HR: 97 (12-08-19 @ 00:04) (84 - 106)  BP: 90/54 (12-07-19 @ 15:45) (88/52 - 132/66)  BP(mean): 66 (12-07-19 @ 15:45) (64 - 93)  ABP: 102/51 (12-07-19 @ 22:00) (80/47 - 132/62)  ABP(mean): 69 (12-07-19 @ 22:00) (60 - 88)  Wt(kg): --  CVP(cm H2O): --  VBG - ( 06 Dec 2019 15:29 )  pH: 7.27  /  pCO2: 67    /  pO2: <20   / HCO3: 29    / Base Excess: 0.4   /  SaO2: 13     Lactate: 9.4                Exam: regular rate and rhythm  Cardiac Rhythm: sinus  Perfusion     [x]Adequate   [ ]Inadequate  Mentation   [x]Normal       [ ]Reduced  Extremities  [x]Warm         [ ]Cool  Volume Status [ ]Hypervolemic [x]Euvolemic [ ]Hypovolemic  Meds: norepinephrine Infusion 0.05 MICROgram(s)/kG/Min IV Continuous <Continuous>        GI/NUTRITION  Exam: soft, nontender, nondistended, incision C/D/I  Diet: NPO/NGT  Meds: pantoprazole  Injectable 40 milliGRAM(s) IV Push every 12 hours      GENITOURINARY  I&O's Detail    12-06 @ 07:01  -  12-07 @ 07:00  --------------------------------------------------------  IN:    Albumin 5%  - 250 mL: 500 mL    dexmedetomidine Infusion: 73.1 mL    fentaNYL Infusion.: 24.3 mL    IV PiggyBack: 350 mL    norepinephrine Infusion: 126 mL    sodium chloride 0.9%: 800 mL    Sodium Chloride 0.9% IV Bolus: 1000 mL    sodium chloride 0.9%.: 75 mL    Solution: 50 mL    vasopressin Infusion: 16.2 mL  Total IN: 3014.6 mL    OUT:    Indwelling Catheter - Urethral: 325 mL    Nasoenteral Tube: 200 mL    VAC (Vacuum Assisted Closure) System: 500 mL  Total OUT: 1025 mL    Total NET: 1989.6 mL      12-07 @ 07:01  -  12-08 @ 01:32  --------------------------------------------------------  IN:    dexmedetomidine Infusion: 183 mL    Enteral Tube Flush: 80 mL    fentaNYL Infusion.: 40.5 mL    IV PiggyBack: 50 mL    norepinephrine Infusion: 246 mL    Sodium Chloride 0.9% IV Bolus: 1500 mL    sodium chloride 0.9%.: 1125 mL    vasopressin Infusion: 21.6 mL  Total IN: 3246.1 mL    OUT:    Indwelling Catheter - Urethral: 705 mL    Nasoenteral Tube: 350 mL    VAC (Vacuum Assisted Closure) System: 350 mL  Total OUT: 1405 mL    Total NET: 1841.1 mL          12-07    142  |  108  |  41<H>  ----------------------------<  79  4.7   |  19<L>  |  1.78<H>    Ca    8.4      07 Dec 2019 16:55  Phos  3.6     12-07  Mg     2.3     12-07    TPro  5.1<L>  /  Alb  2.8<L>  /  TBili  0.5  /  DBili  0.3<H>  /  AST  56<H>  /  ALT  24  /  AlkPhos  33<L>  12-07    [ ] Martinez catheter, indication: N/A  Meds: sodium chloride 0.9%. 1000 milliLiter(s) IV Continuous <Continuous>        HEMATOLOGIC  Meds: enoxaparin Injectable 30 milliGRAM(s) SubCutaneous every 24 hours    [x] VTE Prophylaxis                        14.1   12.41 )-----------( 185      ( 07 Dec 2019 16:55 )             40.6     PT/INR - ( 07 Dec 2019 04:39 )   PT: 14.1 sec;   INR: 1.22 ratio         PTT - ( 07 Dec 2019 04:39 )  PTT:31.1 sec  Transfusion     [ ] PRBC   [ ] Platelets   [ ] FFP   [ ] Cryoprecipitate      INFECTIOUS DISEASES  WBC Count: 12.41 K/uL (12-07 @ 16:55)  WBC Count: 12.99 K/uL (12-07 @ 13:22)  WBC Count: 12.78 K/uL (12-07 @ 08:40)  WBC Count: 11.56 K/uL (12-07 @ 04:39)    RECENT CULTURES:  Specimen Source: .Blood Blood  Date/Time: 12-06 @ 22:20  Culture Results:   No growth to date.  Gram Stain: --  Organism: --    Meds: piperacillin/tazobactam IVPB.. 3.375 Gram(s) IV Intermittent every 12 hours        ENDOCRINE  CAPILLARY BLOOD GLUCOSE        Meds: vasopressin Infusion 0.03 Unit(s)/Min IV Continuous <Continuous>        ACCESS DEVICES:  [ ] Peripheral IV  [x] Central Venous Line	[ ] R	[ ] L	[ ] IJ	[ ] Fem	[ ] SC	Placed:   [x] Arterial Line		[ ] R	[ ] L	[ ] Fem	[ ] Rad	[ ] Ax	Placed:   [ ] PICC:					[ ] Mediport  [ ] Urinary Catheter, Date Placed:   [x] Necessity of urinary, arterial, and venous catheters discussed    OTHER MEDICATIONS:  chlorhexidine 0.12% Liquid 15 milliLiter(s) Oral Mucosa <User Schedule>  chlorhexidine 2% Cloths 1 Application(s) Topical <User Schedule>      CODE STATUS: full code      IMAGING:

## 2019-12-08 NOTE — PROGRESS NOTE ADULT - ASSESSMENT
75 y/o M presenting with septic shock and high grade SBO s/p exploratory laparotomy, lysis of adhesions, decompression of bowel via enterotomy w/ primary repair, and Abthera VAC placement on 12/6    PLAN:    Neuro: acute pain  - Precedex for sedation while intubated  - Fentanyl drip for analgesia    Resp: acute respiratory failure  - Continue mechanical ventilatory support  - Trend ABG and lactate q4 hours  - Daily CXR while intubated  - Duonebs    CV: septic shock requiring vasopressor support  - Wean Levophed as tolerated for MAP >65  - Vasopressin at 0.03 units/min  - Naeem Trac, CVP, trend lactate  - given 250cc albumin overnight  - Critical care echocardiography and fluid resuscitation    GI: SBO s/p exploratory laparotomy, lysis of adhesions, decompression of bowel via enterotomy w/ primary repair, and Abthera VAC placement  - NPO/ NGT  - OR today  - Protonix for stress ulcer prophylaxis    Renal: CHI   - NS @ 75 ml/hr  - Martinez, strict I&Os  - s/p 250cc albumin overnight    Heme: no acute issues  - Lovenox for VTE prophylaxis  - Vengeorge    ID: septic shock 2/2 intra abdominal infection  - Continue Zosyn    Endo: no acute issues  - Monitor glucose on BMP    Disposition:  - Full code  - Will admit to SICU    SICU, [id number]

## 2019-12-08 NOTE — PROGRESS NOTE ADULT - ASSESSMENT
74M POD2 s/p ex lap, MIRYAM, closure of enterotomy, abthera vac placement    Plan:  Incomplete note  - Diet: NPO  - Activity: bed rest  - Labs: f/u post op AM labs  - Monitor K Cr and lactate  - Pain medication  - DVT ppx  - c/w zosyn q12  - CIWA  - wean off pressors  - supportive care per SICU  - abthera  - OR today 12/8 for washout    [facility] S[facility]  [id number] 74M POD2 s/p ex lap, MIRYAM, closure of enterotomy, abthera vac placement    Plan:  - Diet: NPO  - Labs: f/u post op AM labs  - Monitor K Cr and lactate  - c/w zosyn q12  - Pain medication  - Pressor support  - DVT ppx  - CIWA  - Activity: bed rest  - supportive care per SICU  - RTOR today 12/8 for washout    [facility] S[facility]  [id number]

## 2019-12-09 ENCOUNTER — TRANSCRIPTION ENCOUNTER (OUTPATIENT)
Age: 74
End: 2019-12-09

## 2019-12-09 LAB
ALBUMIN SERPL ELPH-MCNC: 2.2 G/DL — LOW (ref 3.3–5)
ALBUMIN SERPL ELPH-MCNC: 2.3 G/DL — LOW (ref 3.3–5)
ALP SERPL-CCNC: 38 U/L — LOW (ref 40–120)
ALP SERPL-CCNC: 39 U/L — LOW (ref 40–120)
ALT FLD-CCNC: 26 U/L — SIGNIFICANT CHANGE UP (ref 10–45)
ALT FLD-CCNC: 28 U/L — SIGNIFICANT CHANGE UP (ref 10–45)
ANION GAP SERPL CALC-SCNC: 12 MMOL/L — SIGNIFICANT CHANGE UP (ref 5–17)
ANION GAP SERPL CALC-SCNC: 14 MMOL/L — SIGNIFICANT CHANGE UP (ref 5–17)
AST SERPL-CCNC: 34 U/L — SIGNIFICANT CHANGE UP (ref 10–40)
AST SERPL-CCNC: 40 U/L — SIGNIFICANT CHANGE UP (ref 10–40)
BILIRUB DIRECT SERPL-MCNC: 0.3 MG/DL — HIGH (ref 0–0.2)
BILIRUB DIRECT SERPL-MCNC: 0.4 MG/DL — HIGH (ref 0–0.2)
BILIRUB INDIRECT FLD-MCNC: 0.3 MG/DL — SIGNIFICANT CHANGE UP (ref 0.2–1)
BILIRUB INDIRECT FLD-MCNC: 0.4 MG/DL — SIGNIFICANT CHANGE UP (ref 0.2–1)
BILIRUB SERPL-MCNC: 0.7 MG/DL — SIGNIFICANT CHANGE UP (ref 0.2–1.2)
BILIRUB SERPL-MCNC: 0.7 MG/DL — SIGNIFICANT CHANGE UP (ref 0.2–1.2)
BUN SERPL-MCNC: 36 MG/DL — HIGH (ref 7–23)
BUN SERPL-MCNC: 36 MG/DL — HIGH (ref 7–23)
CALCIUM SERPL-MCNC: 7.1 MG/DL — LOW (ref 8.4–10.5)
CALCIUM SERPL-MCNC: 7.7 MG/DL — LOW (ref 8.4–10.5)
CHLORIDE SERPL-SCNC: 107 MMOL/L — SIGNIFICANT CHANGE UP (ref 96–108)
CHLORIDE SERPL-SCNC: 108 MMOL/L — SIGNIFICANT CHANGE UP (ref 96–108)
CO2 SERPL-SCNC: 20 MMOL/L — LOW (ref 22–31)
CO2 SERPL-SCNC: 22 MMOL/L — SIGNIFICANT CHANGE UP (ref 22–31)
CREAT SERPL-MCNC: 1.02 MG/DL — SIGNIFICANT CHANGE UP (ref 0.5–1.3)
CREAT SERPL-MCNC: 1.03 MG/DL — SIGNIFICANT CHANGE UP (ref 0.5–1.3)
GAS PNL BLDA: SIGNIFICANT CHANGE UP
GLUCOSE BLDC GLUCOMTR-MCNC: 105 MG/DL — HIGH (ref 70–99)
GLUCOSE BLDC GLUCOMTR-MCNC: 106 MG/DL — HIGH (ref 70–99)
GLUCOSE BLDC GLUCOMTR-MCNC: 117 MG/DL — HIGH (ref 70–99)
GLUCOSE BLDC GLUCOMTR-MCNC: 119 MG/DL — HIGH (ref 70–99)
GLUCOSE SERPL-MCNC: 131 MG/DL — HIGH (ref 70–99)
GLUCOSE SERPL-MCNC: 154 MG/DL — HIGH (ref 70–99)
HCT VFR BLD CALC: 31.6 % — LOW (ref 39–50)
HCT VFR BLD CALC: 33.4 % — LOW (ref 39–50)
HGB BLD-MCNC: 10.6 G/DL — LOW (ref 13–17)
HGB BLD-MCNC: 10.9 G/DL — LOW (ref 13–17)
MAGNESIUM SERPL-MCNC: 2.8 MG/DL — HIGH (ref 1.6–2.6)
MAGNESIUM SERPL-MCNC: 2.9 MG/DL — HIGH (ref 1.6–2.6)
MCHC RBC-ENTMCNC: 30.4 PG — SIGNIFICANT CHANGE UP (ref 27–34)
MCHC RBC-ENTMCNC: 30.5 PG — SIGNIFICANT CHANGE UP (ref 27–34)
MCHC RBC-ENTMCNC: 32.6 GM/DL — SIGNIFICANT CHANGE UP (ref 32–36)
MCHC RBC-ENTMCNC: 33.5 GM/DL — SIGNIFICANT CHANGE UP (ref 32–36)
MCV RBC AUTO: 91.1 FL — SIGNIFICANT CHANGE UP (ref 80–100)
MCV RBC AUTO: 93 FL — SIGNIFICANT CHANGE UP (ref 80–100)
NRBC # BLD: 0 /100 WBCS — SIGNIFICANT CHANGE UP (ref 0–0)
NRBC # BLD: 0 /100 WBCS — SIGNIFICANT CHANGE UP (ref 0–0)
PHOSPHATE SERPL-MCNC: 3.3 MG/DL — SIGNIFICANT CHANGE UP (ref 2.5–4.5)
PHOSPHATE SERPL-MCNC: 4.6 MG/DL — HIGH (ref 2.5–4.5)
PLATELET # BLD AUTO: 109 K/UL — LOW (ref 150–400)
PLATELET # BLD AUTO: 110 K/UL — LOW (ref 150–400)
POTASSIUM SERPL-MCNC: 3.9 MMOL/L — SIGNIFICANT CHANGE UP (ref 3.5–5.3)
POTASSIUM SERPL-MCNC: 4 MMOL/L — SIGNIFICANT CHANGE UP (ref 3.5–5.3)
POTASSIUM SERPL-SCNC: 3.9 MMOL/L — SIGNIFICANT CHANGE UP (ref 3.5–5.3)
POTASSIUM SERPL-SCNC: 4 MMOL/L — SIGNIFICANT CHANGE UP (ref 3.5–5.3)
PROT SERPL-MCNC: 5 G/DL — LOW (ref 6–8.3)
PROT SERPL-MCNC: 5.1 G/DL — LOW (ref 6–8.3)
RBC # BLD: 3.47 M/UL — LOW (ref 4.2–5.8)
RBC # BLD: 3.59 M/UL — LOW (ref 4.2–5.8)
RBC # FLD: 14.5 % — SIGNIFICANT CHANGE UP (ref 10.3–14.5)
RBC # FLD: 14.6 % — HIGH (ref 10.3–14.5)
SODIUM SERPL-SCNC: 141 MMOL/L — SIGNIFICANT CHANGE UP (ref 135–145)
SODIUM SERPL-SCNC: 142 MMOL/L — SIGNIFICANT CHANGE UP (ref 135–145)
WBC # BLD: 8.26 K/UL — SIGNIFICANT CHANGE UP (ref 3.8–10.5)
WBC # BLD: 8.28 K/UL — SIGNIFICANT CHANGE UP (ref 3.8–10.5)
WBC # FLD AUTO: 8.26 K/UL — SIGNIFICANT CHANGE UP (ref 3.8–10.5)
WBC # FLD AUTO: 8.28 K/UL — SIGNIFICANT CHANGE UP (ref 3.8–10.5)

## 2019-12-09 PROCEDURE — 99233 SBSQ HOSP IP/OBS HIGH 50: CPT

## 2019-12-09 PROCEDURE — 71045 X-RAY EXAM CHEST 1 VIEW: CPT | Mod: 26

## 2019-12-09 RX ORDER — SODIUM CHLORIDE 9 MG/ML
1000 INJECTION, SOLUTION INTRAVENOUS
Refills: 0 | Status: DISCONTINUED | OUTPATIENT
Start: 2019-12-09 | End: 2019-12-10

## 2019-12-09 RX ORDER — CHLORHEXIDINE GLUCONATE 213 G/1000ML
1 SOLUTION TOPICAL
Refills: 0 | Status: DISCONTINUED | OUTPATIENT
Start: 2019-12-09 | End: 2019-12-10

## 2019-12-09 RX ORDER — HYDROMORPHONE HYDROCHLORIDE 2 MG/ML
0.5 INJECTION INTRAMUSCULAR; INTRAVENOUS; SUBCUTANEOUS
Refills: 0 | Status: DISCONTINUED | OUTPATIENT
Start: 2019-12-09 | End: 2019-12-10

## 2019-12-09 RX ORDER — CHLORHEXIDINE GLUCONATE 213 G/1000ML
15 SOLUTION TOPICAL
Refills: 0 | Status: DISCONTINUED | OUTPATIENT
Start: 2019-12-09 | End: 2019-12-10

## 2019-12-09 RX ADMIN — CHLORHEXIDINE GLUCONATE 15 MILLILITER(S): 213 SOLUTION TOPICAL at 06:00

## 2019-12-09 RX ADMIN — PIPERACILLIN AND TAZOBACTAM 25 GRAM(S): 4; .5 INJECTION, POWDER, LYOPHILIZED, FOR SOLUTION INTRAVENOUS at 13:56

## 2019-12-09 RX ADMIN — PANTOPRAZOLE SODIUM 40 MILLIGRAM(S): 20 TABLET, DELAYED RELEASE ORAL at 05:57

## 2019-12-09 RX ADMIN — SODIUM CHLORIDE 125 MILLILITER(S): 9 INJECTION, SOLUTION INTRAVENOUS at 10:30

## 2019-12-09 RX ADMIN — HYDROMORPHONE HYDROCHLORIDE 0.5 MILLIGRAM(S): 2 INJECTION INTRAMUSCULAR; INTRAVENOUS; SUBCUTANEOUS at 01:39

## 2019-12-09 RX ADMIN — PIPERACILLIN AND TAZOBACTAM 25 GRAM(S): 4; .5 INJECTION, POWDER, LYOPHILIZED, FOR SOLUTION INTRAVENOUS at 05:58

## 2019-12-09 RX ADMIN — PANTOPRAZOLE SODIUM 40 MILLIGRAM(S): 20 TABLET, DELAYED RELEASE ORAL at 18:27

## 2019-12-09 RX ADMIN — HYDROMORPHONE HYDROCHLORIDE 0.5 MILLIGRAM(S): 2 INJECTION INTRAMUSCULAR; INTRAVENOUS; SUBCUTANEOUS at 12:10

## 2019-12-09 RX ADMIN — HYDROMORPHONE HYDROCHLORIDE 0.5 MILLIGRAM(S): 2 INJECTION INTRAMUSCULAR; INTRAVENOUS; SUBCUTANEOUS at 13:57

## 2019-12-09 RX ADMIN — HYDROMORPHONE HYDROCHLORIDE 0.5 MILLIGRAM(S): 2 INJECTION INTRAMUSCULAR; INTRAVENOUS; SUBCUTANEOUS at 06:21

## 2019-12-09 RX ADMIN — PIPERACILLIN AND TAZOBACTAM 25 GRAM(S): 4; .5 INJECTION, POWDER, LYOPHILIZED, FOR SOLUTION INTRAVENOUS at 21:49

## 2019-12-09 RX ADMIN — CHLORHEXIDINE GLUCONATE 15 MILLILITER(S): 213 SOLUTION TOPICAL at 13:57

## 2019-12-09 RX ADMIN — ENOXAPARIN SODIUM 40 MILLIGRAM(S): 100 INJECTION SUBCUTANEOUS at 05:58

## 2019-12-09 RX ADMIN — Medication 250 MILLIMOLE(S): at 05:59

## 2019-12-09 RX ADMIN — Medication 250 MILLIMOLE(S): at 08:48

## 2019-12-09 RX ADMIN — VASOPRESSIN 1.8 UNIT(S)/MIN: 20 INJECTION INTRAVENOUS at 05:59

## 2019-12-09 RX ADMIN — HYDROMORPHONE HYDROCHLORIDE 0.5 MILLIGRAM(S): 2 INJECTION INTRAMUSCULAR; INTRAVENOUS; SUBCUTANEOUS at 14:15

## 2019-12-09 RX ADMIN — HYDROMORPHONE HYDROCHLORIDE 0.5 MILLIGRAM(S): 2 INJECTION INTRAMUSCULAR; INTRAVENOUS; SUBCUTANEOUS at 11:55

## 2019-12-09 RX ADMIN — HYDROMORPHONE HYDROCHLORIDE 0.5 MILLIGRAM(S): 2 INJECTION INTRAMUSCULAR; INTRAVENOUS; SUBCUTANEOUS at 01:55

## 2019-12-09 RX ADMIN — SODIUM CHLORIDE 125 MILLILITER(S): 9 INJECTION, SOLUTION INTRAVENOUS at 18:34

## 2019-12-09 RX ADMIN — CHLORHEXIDINE GLUCONATE 15 MILLILITER(S): 213 SOLUTION TOPICAL at 21:49

## 2019-12-09 RX ADMIN — SODIUM CHLORIDE 100 MILLILITER(S): 9 INJECTION, SOLUTION INTRAVENOUS at 05:59

## 2019-12-09 RX ADMIN — DEXMEDETOMIDINE HYDROCHLORIDE IN 0.9% SODIUM CHLORIDE 2.71 MICROGRAM(S)/KG/HR: 4 INJECTION INTRAVENOUS at 01:46

## 2019-12-09 RX ADMIN — CHLORHEXIDINE GLUCONATE 1 APPLICATION(S): 213 SOLUTION TOPICAL at 06:00

## 2019-12-09 RX ADMIN — HYDROMORPHONE HYDROCHLORIDE 0.5 MILLIGRAM(S): 2 INJECTION INTRAMUSCULAR; INTRAVENOUS; SUBCUTANEOUS at 06:35

## 2019-12-09 NOTE — PROGRESS NOTE ADULT - ATTENDING COMMENTS
75 y/o M presenting with septic shock and high grade SBO s/p exploratory laparotomy, lysis of adhesions, decompression of bowel via enterotomy w/ primary repair, and Abthera VAC placement on 12/6; s/p take down of Abthera, washout and re-application of the Abthera vac on 12/8 sec to patchy ischemic small bowel  Sedated with precedex drip gtt, pain control with fentanyl drip  Vent adjustment  Titrate Vasopressin off, tachycardia resolved with weaning off levophed to Neosynephrine, off Alvaro now  Change IVF LR to plasmalyte sec to hyperchloremia  Continue Zosyn for 7 day course for bowel ischemia  Possible OR in AM for re exlap    Discussed with Dr Siegel

## 2019-12-09 NOTE — PROGRESS NOTE ADULT - ASSESSMENT
ASSESSMENT  73 y/o M presenting with septic shock and high grade SBO s/p exploratory laparotomy, lysis of adhesions, decompression of bowel via enterotomy w/ primary repair, and Abthera VAC placement on 12/6; s/p take down of Abthera, washout and re-application of the Abthera vac on 12/8.     PLAN:    Neuro: acute pain  - Precedex for sedation while intubated  - Fentanyl drip for analgesia    Resp: acute respiratory failure  - Continue mechanical ventilatory support  - Wean mechanical vent, SBT this AM  - Trend ABG and lactate q4 hours  - Daily CXR while intubated  - Duonebs    CV: septic shock requiring vasopressor support  - Wean vasopressin as tolerated for MAP >65  - Naeem Trac, CVP, trend lactate - 1.8 most recently    GI: SBO s/p exploratory laparotomy, lysis of adhesions, decompression of bowel via enterotomy w/ primary repair, and Abthera VAC placement  - NPO/ NGT  - TPN consultation  - Protonix for stress ulcer prophylaxis  - PPI BID  - Plan for RTOR on Tuesday    Renal: CHI   - LR @ 100   - Martinez, strict I&Os    Heme: no acute issues  - Lovenox for VTE prophylaxis  - Venodynes    ID: septic shock 2/2 intra abdominal infection  - Continue Zosyn    Endo: no acute issues  - Monitor glucose on BMP

## 2019-12-09 NOTE — DIETITIAN INITIAL EVALUATION ADULT. - ENTERAL
RD remains available for EN recommendations if needed. If EN is warranted, initiate Vital1.2 at 10ml/hr, increase as tolerated to goal rate 50ml/hour x 24 hours to provide 1200ml formula, 1440cal/day, 90Gm protein/day, 973ml free water; meets 27cal/Kg and 1.7Gm protein/Kg per dosing wt 54.2Kg.

## 2019-12-09 NOTE — OCCUPATIONAL THERAPY INITIAL EVALUATION ADULT - PERTINENT HX OF CURRENT PROBLEM, REHAB EVAL
73 y/o with PMhx of COPD and ETOH abuse p/w abdominal pain x2 days. Reports that pain felt more like gas pain, and had similar episodes in the past. Felt nauseous and couldn't vomit. Pt forced himself to vomit had hematemesis. Unable to tolerate PO in last day. Reports unable to pass gas or have BM in 3 days. See below

## 2019-12-09 NOTE — OCCUPATIONAL THERAPY INITIAL EVALUATION ADULT - LIVES WITH, PROFILE
Pt lives alone in private home with flight, tub in bathroom. Pt I in ADLs and ambulation prior to admission

## 2019-12-09 NOTE — DIETITIAN INITIAL EVALUATION ADULT. - PERTINENT LABORATORY DATA
12-09 @ 02:24: Sodium 141, Potassium 4.0, Chloride 111<H>, Calcium 7.8<L>, Magnesium 2.9<H>, Phosphorus 2.3<L>, BUN 33<H>, Creatinine 1.08, <H>, Alk Phos 38<L>, ALT/SGPT 27, AST/SGOT 44<H>, Total Protein 5.0<L>, Albumin 2.2<L>, Total Bilirubin 0.7, Direct Bilirubin 0.3<H>, Hemoglobin 10.9<L>, Hematocrit 32.8<L>  Phosphorus 3.0, BUN 34<H>, Creatinine 1.16, <H>, Alk Phos 40, ALT/SGPT 28, AST/SGOT 48<H>, Total Protein 5.1<L>, Albumin 2.4<L>, Total Bilirubin 0.7, Direct Bilirubin 0.3<H>, Hemoglobin 11.6<L>, Hematocrit 35.0<L>

## 2019-12-09 NOTE — DIETITIAN INITIAL EVALUATION ADULT. - REASON INDICATOR FOR ASSESSMENT
Nutrition Assessment warranted for length of stay on 8ICU.  Information obtained from: RN, medical record. Pt intubated, sedated. No family present.  Per chart: "75 y/o M presenting with septic shock and high grade SBO s/p exploratory laparotomy, lysis of adhesions, decompression of bowel via enterotomy w/ primary repair, and Abthera VAC placement on 12/6; s/p take down of Abthera, washout and re-application of the Abthera vac on 12/8."

## 2019-12-09 NOTE — CHART NOTE - NSCHARTNOTEFT_GEN_A_CORE
Surgery Preop Note    Patient is a 74y old  Male who presents with a chief complaint of abd. pain (07 Dec 2019 11:16)    Diagnosis: High grade SBO  Procedure: Ex lap wound washout possible ostomy creation possible abthera placement possible abd closure  Surgeon: Christal    Patient seen and examined this am.   Intubated on fentanyl     ICU Vital Signs Last 24 Hrs  T(C): 36.5 (09 Dec 2019 07:00), Max: 37.3 (08 Dec 2019 11:00)  T(F): 97.7 (09 Dec 2019 07:00), Max: 99.1 (08 Dec 2019 11:00)  HR: 58 (09 Dec 2019 08:15) (58 - 82)  BP: 95/54 (09 Dec 2019 07:00) (88/55 - 141/73)  BP(mean): 67 (09 Dec 2019 07:00) (67 - 99)  ABP: 113/53 (09 Dec 2019 08:15) (78/44 - 146/56)  ABP(mean): 74 (09 Dec 2019 08:15) (60 - 94)  RR: 22 (09 Dec 2019 08:15) (22 - 32)  SpO2: 100% (09 Dec 2019 08:15) (93% - 100%)    Exam:  Gen: NAD, resting in bed  Resp: Intubated  Abd: Soft, ND, TTP x 4 quadrants, Rebound tenderness. VAC in place. No erythema/induration. NGT  : Martinez  Ext: No edema, WWP wrist restraints ICDs    LABS:                        10.9   7.17  )-----------( 109      ( 09 Dec 2019 02:24 )             32.8     12-09    141  |  111<H>  |  33<H>  ----------------------------<  133<H>  4.0   |  20<L>  |  1.08    Ca    7.8<L>      09 Dec 2019 02:24  Phos  2.3     12-09  Mg     2.9     12-09    TPro  5.0<L>  /  Alb  2.2<L>  /  TBili  0.7  /  DBili  0.3<H>  /  AST  44<H>  /  ALT  27  /  AlkPhos  38<L>  12-09    PT/INR - ( 09 Dec 2019 02:24 )   PT: 12.8 sec;   INR: 1.11 ratio         PTT - ( 09 Dec 2019 02:24 )  PTT:37.2 sec    ABO Interpretation: O (12-06 @ 17:51)  ABO Interpretation: O (12-06 @ 15:31)      Chest X RAY 12/7 clear  EKG 12/6 sinus tach to 122    Assessment:  74M POD3 sp ex lap MIRYAM abthera placement POD1 ex lap abd washout abthera placement for high grade SBO preop for ex lap wound washout possible ostomy creation possible abthera placement possible abd closure     Plan:  NPO   IVF  Pain Control  DVT prophylaxis  CBC BMP Mg Phos Coags TS as above  PreOp labs ordered  CXR EKG as above  Pt consented consent in chart    YAMILET Appiah, PGY-1  [facility]  [id number]

## 2019-12-09 NOTE — OCCUPATIONAL THERAPY INITIAL EVALUATION ADULT - SIT-TO-STAND BALANCE
Patient states readiness to go home and verbalizes understanding of discharge instructions.   fair minus

## 2019-12-09 NOTE — PROGRESS NOTE ADULT - ASSESSMENT
74M POD3 s/p ex lap, MIRYAM, closure of enterotomy, abthera vac placement POD1 ex lap abd washout abthera placement    Plan:  - Diet: NPO  - Labs: f/u post op AM labs  - Monitor K Cr and lactate  - c/w zosyn  - Pain medication  - Pressor support, wean  - DVT ppx  - CIWA  - Activity: bed rest  - supportive care per SICU  - RTOR tomorrow 12/10   - Will preop consent    [facility] S[facility]  [id number] 74M POD3 s/p ex lap, MIRYAM, closure of enterotomy, abthera vac placement POD1 ex lap abd washout abthera placement    Plan:  - Diet: NPO  - Labs: f/u post op AM labs  - Monitor K Cr and lactate  - c/w zosyn  - Pain medication  - Pressor support, wean  - DVT ppx  - CIWA  - Activity: bed rest  - supportive care per SICU  - TPN per SICU  - RTOR tomorrow 12/10   - Will preop consent    [facility] Team Surg  [id number] 74M POD3 s/p ex lap, MIRYAM, closure of enterotomy, abthera vac placement POD1 ex lap abd washout abthera placement    Plan:  - Diet: NPO  - Labs: f/u post op AM labs  - Monitor K Cr and lactate  - c/w zosyn  - Pain medication  - Pressor support, wean  - DVT ppx  - CIWA  - Activity: bed rest  - supportive care per SICU  - TPN per SICU  - RTOR tomorrow 12/10 for definitive procedure,  - Will preop consent    [facility]  [id number]

## 2019-12-09 NOTE — DIETITIAN INITIAL EVALUATION ADULT. - ENERGY NEEDS
The Michael State Equation (PSU) 2003b was used to calculate resting energy expenditure: 1547 calories (29 gregorio/Kg per dosing wt 54.2Kg)

## 2019-12-09 NOTE — DIETITIAN INITIAL EVALUATION ADULT. - PHYSICAL APPEARANCE
underweight/emaciated/other (specify)/estimated ht: 5 feet 9 inches, admit wt: 119 pounds, BMI: 17.7 Kg/m2, IBW: 160 pounds (+/- 10%), 74% IBW Edema: 2+ right arm, wrist, hand  Skin: no pressure injuries noted per nursing flowsheet  Nutrition Focused Physical Exam: Visual Nutrition Focused Physical Assessment performed: pt noted with severe muscle wasting of temples, clavicle, deltoid, moderate depletion of quadriceps region and calf; severe fat depletion of orbital and buccal region, and rib cage. ht: 5 feet 9 inches, admit wt: 119 pounds, BMI: 17.6 Kg/m2, IBW: 160 pounds (+/- 10%), 74% IBW/underweight/emaciated/other (specify)

## 2019-12-09 NOTE — PROGRESS NOTE ADULT - SUBJECTIVE AND OBJECTIVE BOX
GENERAL SURGERY DAILY PROGRESS NOTE:    Interval:  24 HOUR EVENTS:  - Given 2x FFP and vitamin K 10  - Repeat INR 1.53 and 1.11  - Taken back to OR and underwent wash out, still residual ischemic bowel so decided not to close, remaining 165 cm of viable bowel, 500 IVF, UOP 50, EBL 5  - Brought back intubated, on alvaro and vaso  - Off Alvaro since 10 PM    Subjective:  No acute events overnight  Patient seen and examined this am.   Intubated on fentanyl     ICU Vital Signs Last 24 Hrs  T(C): 36.5 (09 Dec 2019 07:00), Max: 37.3 (08 Dec 2019 11:00)  T(F): 97.7 (09 Dec 2019 07:00), Max: 99.1 (08 Dec 2019 11:00)  HR: 58 (09 Dec 2019 08:15) (58 - 82)  BP: 95/54 (09 Dec 2019 07:00) (88/55 - 141/73)  BP(mean): 67 (09 Dec 2019 07:00) (67 - 99)  ABP: 113/53 (09 Dec 2019 08:15) (78/44 - 146/56)  ABP(mean): 74 (09 Dec 2019 08:15) (60 - 94)  RR: 22 (09 Dec 2019 08:15) (22 - 32)  SpO2: 100% (09 Dec 2019 08:15) (93% - 100%)    Exam:  Gen: NAD, resting in bed  Resp: Intubated  Abd: Soft, ND, TTP x 4 quadrants, Rebound tenderness. VAC in place. No erythema/induration. NGT  : Martinez  Ext: No edema, WWP wrist restraints ICDs    LABS:                        10.9   7.17  )-----------( 109      ( 09 Dec 2019 02:24 )             32.8     12-09    141  |  111<H>  |  33<H>  ----------------------------<  133<H>  4.0   |  20<L>  |  1.08    Ca    7.8<L>      09 Dec 2019 02:24  Phos  2.3     12-09  Mg     2.9     12-09    TPro  5.0<L>  /  Alb  2.2<L>  /  TBili  0.7  /  DBili  0.3<H>  /  AST  44<H>  /  ALT  27  /  AlkPhos  38<L>  12-09    YAMILET Appiah, PGY-1  Red Team Surgery  [id number] with any questions GENERAL SURGERY DAILY PROGRESS NOTE:    Interval:  24 HOUR EVENTS:  - Given 2x FFP and vitamin K 10  - Repeat INR 1.53 and 1.11  - Taken back to OR and underwent wash out, still areas of possible patchy ischemic bowel so decided not to close, remaining proximal 165 cm of viable bowel, 500 IVF, UOP 50, EBL 5  - Brought back intubated, on alvaro and vaso  - Off Alvaro since 10 PM    Subjective:  No acute events overnight  Patient seen and examined this am.   Intubated on fentanyl     ICU Vital Signs Last 24 Hrs  T(C): 36.5 (09 Dec 2019 07:00), Max: 37.3 (08 Dec 2019 11:00)  T(F): 97.7 (09 Dec 2019 07:00), Max: 99.1 (08 Dec 2019 11:00)  HR: 58 (09 Dec 2019 08:15) (58 - 82)  BP: 95/54 (09 Dec 2019 07:00) (88/55 - 141/73)  BP(mean): 67 (09 Dec 2019 07:00) (67 - 99)  ABP: 113/53 (09 Dec 2019 08:15) (78/44 - 146/56)  ABP(mean): 74 (09 Dec 2019 08:15) (60 - 94)  RR: 22 (09 Dec 2019 08:15) (22 - 32)  SpO2: 100% (09 Dec 2019 08:15) (93% - 100%)    Exam:  Gen: NAD, resting in bed  Resp: Intubated  Abd: Soft, ND, TTP x 4 quadrants, Rebound tenderness. VAC in place. No erythema/induration. NGT  : Martinez  Ext: No edema, WWP wrist restraints ICDs    LABS:                        10.9   7.17  )-----------( 109      ( 09 Dec 2019 02:24 )             32.8     12-09    141  |  111<H>  |  33<H>  ----------------------------<  133<H>  4.0   |  20<L>  |  1.08    Ca    7.8<L>      09 Dec 2019 02:24  Phos  2.3     12-09  Mg     2.9     12-09    TPro  5.0<L>  /  Alb  2.2<L>  /  TBili  0.7  /  DBili  0.3<H>  /  AST  44<H>  /  ALT  27  /  AlkPhos  38<L>  12-09    YAMILET Appiah, PGY-1  Red Team Surgery  [id number] with any questions

## 2019-12-09 NOTE — PROGRESS NOTE ADULT - SUBJECTIVE AND OBJECTIVE BOX
SICU DAILY PROGRESS NOTE    74 year old male with a past medical history of COPD and EtOH dependence who presented today with abdominal pain, nausea, hematemesis, and poor PO intake for ~2-3 days. Labs significant for an CHI w/ Cr 2.74 and lactate of 9.4. He was also notably tachycardic to the 110s and hypotensive w/ SBP in the 70s. He was given 2 units of PRBCs and 2 L of LR in the ED due to concern for upper GI bleeding. Imaging revealed high grade SBO in the RLQ. Patient was taken to the OR emergently for an exploratory laparotomy, lysis of adhesions, decompression of bowel via enterotomy w/ primary repair, and Abthera VAC placement. Of note, the distal 50% of the bowel appeared dusky but viable. He required vasopressor support with phenylephrine and vasopressin infusions. He received 3000 mL of crystalloid w/ EBL of 10 mL and UOP of 25 mL. Patient was left intubated at the end of the case so SICU was consulted for hemodynamic monitoring.  Taken to the OR on 12/6 and underwent Ex-lap with MIRYAM, enterotomy for bowel decompression and Abthera VAC placement.   Taken back to the OR on 12/8 and underwent take down of Abthera, washout and re-application of the Abthera vac.     24 HOUR EVENTS:  - Given 2x FFP and vitamin K 10  - Repeat INR 1.53 and 1.11  - Taken back to OR and underwent wash out, still residual ischemic bowel so decided not to close, remaining 165 cm of viable bowel, 500 IVF, UOP 50, EBL 5  - Brought back intubated, on alvaro and vaso  - Off Alvaro since 10 PM    SUBJECTIVE/ROS:  [ ] A ten-point review of systems was otherwise negative except as noted.  [X] Due to altered mental status/intubation, subjective information were not able to be obtained from the patient. History was obtained, to the extent possible, from review of the chart and collateral sources of information.    NEURO  RASS: 0    Exam: sedated but arousable, not following commands  Meds: dexMEDEtomidine Infusion 0.2 MICROgram(s)/kG/Hr IV Continuous <Continuous>  fentaNYL   Infusion. 1 MICROgram(s)/kG/Hr IV Continuous <Continuous>  HYDROmorphone  Injectable 0.5 milliGRAM(s) IV Push every 3 hours PRN Severe Pain (7 - 10)    [x] Adequacy of sedation and pain control has been assessed and adjusted    RESPIRATORY  RR: 22 (12-09-19 @ 04:45) (22 - 32)  SpO2: 100% (12-09-19 @ 04:45) (93% - 100%)  Exam: nonlabored breathing on mechanical ventilation, clear to auscultation bilaterally,  equal chest rise bilaterally   Mechanical Ventilation: Mode: AC/ CMV (Assist Control/ Continuous Mandatory Ventilation), RR (machine): 22, RR (patient): 22, TV (machine): 450, FiO2: 30, PEEP: 5, ITime: 1, MAP: 10, PIP: 21  ABG - ( 09 Dec 2019 02:15 )  pH: 7.41  /  pCO2: 36    /  pO2: 100   / HCO3: 22    / Base Excess: -1.2  /  SaO2: 98        [N/A] Extubation Readiness Assessed    CARDIOVASCULAR  HR: 64 (12-09-19 @ 04:45) (62 - 87)  BP: 112/65 (12-09-19 @ 04:00) (88/55 - 141/73)  BP(mean): 83 (12-09-19 @ 04:00) (67 - 99)  ABP: 130/59 (12-09-19 @ 04:45) (77/45 - 148/62)  ABP(mean): 85 (12-09-19 @ 04:45) (57 - 94)    Exam: regular rate and rhythm  Cardiac Rhythm: sinus  Perfusion     [x]Adequate   [ ]Inadequate  Mentation   [x]Normal       [ ]Reduced  Extremities  [x]Warm         [ ]Cool  Volume Status [ ]Hypervolemic [x]Euvolemic [ ]Hypovolemic  Meds: phenylephrine    Infusion 0.1 MICROgram(s)/kG/Min IV Continuous <Continuous>    GI/NUTRITION  Exam: soft, nontender, nondistended, incision C/D/I  Diet: NPO/NGT  Meds: pantoprazole  Injectable 40 milliGRAM(s) IV Push every 12 hours    GENITOURINARY  I&O's Detail    12-07 @ 07:01  -  12-08 @ 07:00  --------------------------------------------------------  IN:    dexmedetomidine Infusion: 327 mL    dextrose 5% + lactated ringers.: 150 mL    Enteral Tube Flush: 80 mL    fentaNYL Infusion.: 64.8 mL    IV PiggyBack: 75 mL    norepinephrine Infusion: 422 mL    phenylephrine   Infusion: 10 mL    sodium chloride 0.9%: 1650 mL    Sodium Chloride 0.9% IV Bolus: 1500 mL    vasopressin Infusion: 43.2 mL  Total IN: 4322 mL    OUT:    Indwelling Catheter - Urethral: 1255 mL    Nasoenteral Tube: 600 mL    VAC (Vacuum Assisted Closure) System: 350 mL  Total OUT: 2205 mL    Total NET: 2117 mL    12-08 @ 07:01  -  12-09 @ 05:27  --------------------------------------------------------  IN:    dexmedetomidine Infusion: 126.2 mL    dexmedetomidine Infusion: 98.6 mL    dextrose 5% + lactated ringers.: 750 mL    Enteral Tube Flush: 120 mL    fentaNYL Infusion.: 27 mL    fentaNYL Infusion.: 27 mL    IV PiggyBack: 175 mL    lactated ringers.: 900 mL    phenylephrine   Infusion: 18.3 mL    phenylephrine   Infusion: 18.1 mL    phenylephrine   Infusion: 4.8 mL    Plasma: 500 mL    Solution: 250 mL    vasopressin Infusion: 18 mL    vasopressin Infusion: 16.2 mL  Total IN: 3049.2 mL    OUT:    Indwelling Catheter - Urethral: 605 mL    Nasoenteral Tube: 100 mL    VAC (Vacuum Assisted Closure) System: 800 mL  Total OUT: 1505 mL    Total NET: 1544.2 mL    12-09    141  |  111<H>  |  33<H>  ----------------------------<  133<H>  4.0   |  20<L>  |  1.08    Ca    7.8<L>      09 Dec 2019 02:24  Phos  2.3     12-09  Mg     2.9     12-09    TPro  5.0<L>  /  Alb  2.2<L>  /  TBili  0.7  /  DBili  0.3<H>  /  AST  44<H>  /  ALT  27  /  AlkPhos  38<L>  12-09    [X] Martinez catheter, indication: N/A  Meds: lactated ringers. 1000 milliLiter(s) IV Continuous <Continuous>  sodium phosphate IVPB 15 milliMole(s) IV Intermittent once    HEMATOLOGIC  Meds: enoxaparin Injectable 40 milliGRAM(s) SubCutaneous every 24 hours    [x] VTE Prophylaxis                        10.9   7.17  )-----------( 109      ( 09 Dec 2019 02:24 )             32.8     PT/INR - ( 09 Dec 2019 02:24 )   PT: 12.8 sec;   INR: 1.11 ratio         PTT - ( 09 Dec 2019 02:24 )  PTT:37.2 sec  Transfusion     [ ] PRBC   [ ] Platelets   [ ] FFP   [ ] Cryoprecipitate      INFECTIOUS DISEASES  WBC Count: 7.17 K/uL (12-09 @ 02:24)  WBC Count: 8.66 K/uL (12-08 @ 19:41)  WBC Count: 7.15 K/uL (12-08 @ 10:52)    RECENT CULTURES:  Specimen Source: .Blood Blood  Date/Time: 12-06 @ 22:20  Culture Results:   No growth to date.  Meds: piperacillin/tazobactam IVPB.. 3.375 Gram(s) IV Intermittent every 8 hours    ENDOCRINE  CAPILLARY BLOOD GLUCOSE    POCT Blood Glucose.: 110 mg/dL (08 Dec 2019 23:19)    Meds: vasopressin Infusion 0.03 Unit(s)/Min IV Continuous <Continuous>    ACCESS DEVICES:  [X] Peripheral IV  [X] Central Venous Line	[ ] R	[ ] L	[ ] IJ	[ ] Fem	[ ] SC	Placed:   [X] Arterial Line		[ ] R	[ ] L	[ ] Fem	[ ] Rad	[ ] Ax	Placed:   [ ] PICC:					[ ] Mediport  [X] Urinary Catheter, Date Placed:   [x] Necessity of urinary, arterial, and venous catheters discussed    OTHER MEDICATIONS:  chlorhexidine 0.12% Liquid 15 milliLiter(s) Oral Mucosa every 12 hours

## 2019-12-09 NOTE — DIETITIAN INITIAL EVALUATION ADULT. - ADD RECOMMEND
1) Pending extubation and tolerance to clear liquids, advance to Low Fiber diet as tolerated. 2) Add Ensure Enlive when diet is advanced. 3) Pending need for EN, initiate trophic feeds, Vital1.2 @ 10ml/hr. RD remains available for EN recommendations as needed. 1) Pending extubation and tolerance to clear liquids, advance to Low Fiber diet as tolerated. 2) Add Ensure Enlive when diet is advanced. 3) If EN is warranted, see recommendations above.

## 2019-12-09 NOTE — PROGRESS NOTE ADULT - SUBJECTIVE AND OBJECTIVE BOX
Subjective: Patient seen and examined. No new events except as noted.   Remains intubated in ICU   On vasopressin   UO has dropped   awake, attempting  to speak   2 point restraints       REVIEW OF SYSTEMS:  Unable to obtain       MEDICATIONS:  MEDICATIONS  (STANDING):  chlorhexidine 0.12% Liquid 15 milliLiter(s) Oral Mucosa <User Schedule>  chlorhexidine 2% Cloths 1 Application(s) Topical <User Schedule>  dexMEDEtomidine Infusion 0.2 MICROgram(s)/kG/Hr (2.71 mL/Hr) IV Continuous <Continuous>  enoxaparin Injectable 40 milliGRAM(s) SubCutaneous every 24 hours  fentaNYL   Infusion. 1 MICROgram(s)/kG/Hr (2.71 mL/Hr) IV Continuous <Continuous>  lactated ringers. 1000 milliLiter(s) (100 mL/Hr) IV Continuous <Continuous>  pantoprazole  Injectable 40 milliGRAM(s) IV Push every 12 hours  piperacillin/tazobactam IVPB.. 3.375 Gram(s) IV Intermittent every 8 hours  vasopressin Infusion 0.03 Unit(s)/Min (1.8 mL/Hr) IV Continuous <Continuous>      PHYSICAL EXAM:  T(C): 36.5 (12-09-19 @ 07:00), Max: 37.3 (12-08-19 @ 11:00)  HR: 60 (12-09-19 @ 09:15) (58 - 82)  BP: 95/54 (12-09-19 @ 07:00) (88/55 - 141/73)  RR: 22 (12-09-19 @ 09:15) (22 - 32)  SpO2: 100% (12-09-19 @ 09:15) (93% - 100%)  Wt(kg): --  I&O's Summary    08 Dec 2019 07:01  -  09 Dec 2019 07:00  --------------------------------------------------------  IN: 3723.6 mL / OUT: 2110 mL / NET: 1613.6 mL    09 Dec 2019 07:01  -  09 Dec 2019 09:19  --------------------------------------------------------  IN: 528 mL / OUT: 30 mL / NET: 498 mL      Height (cm): 175.3 (12-09 @ 09:00)    Appearance: Intubated, awake   HEENT:   Dry oral mucosa   Lymphatic: No lymphadenopathy  Cardiovascular: Normal S1 S2, No JVD, No murmurs , Peripheral pulses palpable 2+ bilaterally  Respiratory: ventilated   Gastrointestinal:  Appropriately tender, +VAC  Skin: No rashes, No ecchymoses, No cyanosis, warm to touch  Musculoskeletal: Decreased  range of motion and strength  Psychiatry:  mildly sedated   Ext: No edema      LABS:    CARDIAC MARKERS:  CARDIAC MARKERS ( 08 Dec 2019 02:46 )  x     / x     / 1037 U/L / x     / 23.1 ng/mL                                10.9   7.17  )-----------( 109      ( 09 Dec 2019 02:24 )             32.8     12-09    141  |  111<H>  |  33<H>  ----------------------------<  133<H>  4.0   |  20<L>  |  1.08    Ca    7.8<L>      09 Dec 2019 02:24  Phos  2.3     12-09  Mg     2.9     12-09    TPro  5.0<L>  /  Alb  2.2<L>  /  TBili  0.7  /  DBili  0.3<H>  /  AST  44<H>  /  ALT  27  /  AlkPhos  38<L>  12-09    proBNP:   Lipid Profile:   HgA1c:   TSH:           Blood Gas Profile - Arterial (12.09.19 @ 02:15)    pH, Arterial: 7.41    pCO2, Arterial: 36 mmHg    pO2, Arterial: 100 mmHg    HCO3, Arterial: 22 mmol/L    Base Excess, Arterial: -1.2 mmol/L    Oxygen Saturation, Arterial: 98 %    Total CO2, Arterial: 24 mmoL/L    FIO2, Arterial: 30    Blood Gas Source Arterial: Arterial      TELEMETRY: 	 SR   ECG:  	  RADIOLOGY:   < from: Xray Chest 1 View- PORTABLE-Routine (12.08.19 @ 06:56) >    EXAM:  XR CHEST PORTABLE ROUTINE 1V                            PROCEDURE DATE:  12/08/2019            INTERPRETATION:  Indication: Respiratory failure.    Technique: Single portable view of the chest.    Comparison: 12/7/2018    Findings: The cardiac silhouette is normal in size. There is an   endotracheal tube tip above the milo. There is a nasogastric tube with   its tip below the level of this film. There is a right IJ central venous   catheter with tip overlying the superior vena cava. There are no focal   consolidations or pleural effusions.    Impression: Lines and tubes described above. Clear lungs.                    JOSE FORD M.D., ATTENDING RADIOLOGIST  This document has been electronically signed. Dec  8 2019 11:12AM                < end of copied text >    DIAGNOSTIC TESTING:  [ ] Echocardiogram:  [ ]  Catheterization:  [ ] Stress Test:    OTHER:

## 2019-12-09 NOTE — OCCUPATIONAL THERAPY INITIAL EVALUATION ADULT - PRECAUTIONS/LIMITATIONS, REHAB EVAL
Pt reports that he does not follow up with a PMD regularly, and can't recall last time he saw doctor. Reports he had colonoscopy recently with no significant findings. Last drink was 3 years ago. Reports some difficulty breathing. Denies fevers, chills, chest pain, or urinary changes. Pt reports that he does not follow up with a PMD regularly, and can't recall last time he saw doctor. Reports he had colonoscopy recently with no significant findings. Last drink was 3 years ago. Reports some difficulty breathing. Denies fevers, chills, chest pain, or urinary changes. Pt s/p abdominal washout, ex lap fall precautions/Pt reports that he does not follow up with a PMD regularly, and can't recall last time he saw doctor. Reports he had colonoscopy recently with no significant findings. Last drink was 3 years ago. Reports some difficulty breathing. Denies fevers, chills, chest pain, or urinary changes. Pt s/p 12/6 abdominal washout, 12/8 ex lap, 12/10 Pt s/p Colectomy, partial, with mucous fistula and colostomy or ileostomy creation, End ileostomy

## 2019-12-09 NOTE — OCCUPATIONAL THERAPY INITIAL EVALUATION ADULT - GENERAL OBSERVATIONS, REHAB EVAL
Pt received seated in bedside chair, +ICU monitoring, +chest tube, +JOVANNA drain, +condom cath, +ostomy, +o2 via NC, +IVL, +TPN

## 2019-12-09 NOTE — CHART NOTE - NSCHARTNOTEFT_GEN_A_CORE
Upon Nutritional Assessment by the Registered Dietitian your patient was determined to meet criteria / has evidence of the following diagnosis/diagnoses:          [ ]  Mild Protein Calorie Malnutrition        [ ]  Moderate Protein Calorie Malnutrition        [X ] Severe Protein Calorie Malnutrition        [ ] Unspecified Protein Calorie Malnutrition        [X ] Underweight / BMI <19        [ ] Morbid Obesity / BMI > 40      Findings as based on:  [X ] Comprehensive nutrition assessment   [X ] Nutrition Focused Physical Exam  [X ] Other: pt NPO > 5 days, severe muscle and fat depletion, underweight BMI 17.6 Kg/m2      Nutrition Plan/Recommendations:    1) Pending extubation and tolerance to clear liquids, advance to Low Fiber diet as tolerated.   2) Add Ensure Enlive tid when diet is advanced.   3) If EN is warranted, initiate Vital1.2 at 10ml/hr, increase as tolerated to goal rate 50ml/hour x 24 hours to provide 1200ml formula, 1440cal/day, 90Gm protein/day, 973ml free water; meets 27cal/Kg and 1.7Gm protein/Kg per dosing wt 54.2Kg.      PROVIDER Section:     By signing this assessment you are acknowledging and agree with the diagnosis/diagnoses assigned by the Registered Dietitian    Comments:

## 2019-12-09 NOTE — OCCUPATIONAL THERAPY INITIAL EVALUATION ADULT - ADDITIONAL COMMENTS
CT Abdomen/Pelvis: High-grade small bowel obstruction with transition point in the right lower quadrant.

## 2019-12-09 NOTE — DIETITIAN INITIAL EVALUATION ADULT. - OTHER INFO
INFORMATION PTA  Diet PTA: Unable to assess, h/o EtOH abuse and COPD noted in chart  Nutrition status PTA: Pt appears cachectic, malnourished  Nutrition Supplements PTA: folic acid, vit D3 per H&P  Food Allergies: shellfish allergy  Weight History PTA: unavailable  Other Information: Estimated height = 69 inches, (not 57 inches entered in chart; communicated with RN). Estimated BMI = 17.7 Kg/m2    INFORMATION THIS ADMISSION  NGT output x 24-hours: 100ml  VAC output x 24-hours: 1350ml  Last BM: none noted, per H&P, last BM 3 days PTA  Therapeutic Diet Education Provided: not applicable INFORMATION PTA  Diet PTA: Unable to assess, h/o EtOH abuse and COPD noted in chart  Nutrition status PTA: Pt appears cachectic, malnourished  Nutrition Supplements PTA: folic acid, vit D3 per H&P  Food Allergies: shellfish allergy  Weight History PTA: unavailable    INFORMATION THIS ADMISSION  NGT output x 24-hours: 100ml  VAC output x 24-hours: 1350ml  Last BM: none noted, per H&P, last BM 3 days PTA  Therapeutic Diet Education Provided: not applicable

## 2019-12-10 ENCOUNTER — RESULT REVIEW (OUTPATIENT)
Age: 74
End: 2019-12-10

## 2019-12-10 LAB
ALBUMIN SERPL ELPH-MCNC: 2 G/DL — LOW (ref 3.3–5)
ALBUMIN SERPL ELPH-MCNC: 2.1 G/DL — LOW (ref 3.3–5)
ALP SERPL-CCNC: 37 U/L — LOW (ref 40–120)
ALP SERPL-CCNC: 41 U/L — SIGNIFICANT CHANGE UP (ref 40–120)
ALT FLD-CCNC: 25 U/L — SIGNIFICANT CHANGE UP (ref 10–45)
ALT FLD-CCNC: 26 U/L — SIGNIFICANT CHANGE UP (ref 10–45)
ANION GAP SERPL CALC-SCNC: 12 MMOL/L — SIGNIFICANT CHANGE UP (ref 5–17)
ANION GAP SERPL CALC-SCNC: 13 MMOL/L — SIGNIFICANT CHANGE UP (ref 5–17)
APTT BLD: 24.2 SEC — LOW (ref 27.5–36.3)
AST SERPL-CCNC: 31 U/L — SIGNIFICANT CHANGE UP (ref 10–40)
AST SERPL-CCNC: 33 U/L — SIGNIFICANT CHANGE UP (ref 10–40)
BILIRUB DIRECT SERPL-MCNC: 0.3 MG/DL — HIGH (ref 0–0.2)
BILIRUB DIRECT SERPL-MCNC: 0.4 MG/DL — HIGH (ref 0–0.2)
BILIRUB INDIRECT FLD-MCNC: 0.3 MG/DL — SIGNIFICANT CHANGE UP (ref 0.2–1)
BILIRUB INDIRECT FLD-MCNC: 0.4 MG/DL — SIGNIFICANT CHANGE UP (ref 0.2–1)
BILIRUB SERPL-MCNC: 0.7 MG/DL — SIGNIFICANT CHANGE UP (ref 0.2–1.2)
BILIRUB SERPL-MCNC: 0.7 MG/DL — SIGNIFICANT CHANGE UP (ref 0.2–1.2)
BLD GP AB SCN SERPL QL: NEGATIVE — SIGNIFICANT CHANGE UP
BUN SERPL-MCNC: 30 MG/DL — HIGH (ref 7–23)
BUN SERPL-MCNC: 35 MG/DL — HIGH (ref 7–23)
CALCIUM SERPL-MCNC: 7.3 MG/DL — LOW (ref 8.4–10.5)
CALCIUM SERPL-MCNC: 7.4 MG/DL — LOW (ref 8.4–10.5)
CHLORIDE SERPL-SCNC: 108 MMOL/L — SIGNIFICANT CHANGE UP (ref 96–108)
CHLORIDE SERPL-SCNC: 109 MMOL/L — HIGH (ref 96–108)
CO2 SERPL-SCNC: 21 MMOL/L — LOW (ref 22–31)
CO2 SERPL-SCNC: 22 MMOL/L — SIGNIFICANT CHANGE UP (ref 22–31)
CREAT SERPL-MCNC: 0.83 MG/DL — SIGNIFICANT CHANGE UP (ref 0.5–1.3)
CREAT SERPL-MCNC: 0.95 MG/DL — SIGNIFICANT CHANGE UP (ref 0.5–1.3)
GAS PNL BLDA: SIGNIFICANT CHANGE UP
GLUCOSE SERPL-MCNC: 108 MG/DL — HIGH (ref 70–99)
GLUCOSE SERPL-MCNC: 94 MG/DL — SIGNIFICANT CHANGE UP (ref 70–99)
HCT VFR BLD CALC: 32.7 % — LOW (ref 39–50)
HCT VFR BLD CALC: 32.7 % — LOW (ref 39–50)
HGB BLD-MCNC: 10.6 G/DL — LOW (ref 13–17)
HGB BLD-MCNC: 10.7 G/DL — LOW (ref 13–17)
INR BLD: 0.92 RATIO — SIGNIFICANT CHANGE UP (ref 0.88–1.16)
MAGNESIUM SERPL-MCNC: 2.9 MG/DL — HIGH (ref 1.6–2.6)
MAGNESIUM SERPL-MCNC: 3 MG/DL — HIGH (ref 1.6–2.6)
MCHC RBC-ENTMCNC: 30.5 PG — SIGNIFICANT CHANGE UP (ref 27–34)
MCHC RBC-ENTMCNC: 30.9 PG — SIGNIFICANT CHANGE UP (ref 27–34)
MCHC RBC-ENTMCNC: 32.4 GM/DL — SIGNIFICANT CHANGE UP (ref 32–36)
MCHC RBC-ENTMCNC: 32.7 GM/DL — SIGNIFICANT CHANGE UP (ref 32–36)
MCV RBC AUTO: 94 FL — SIGNIFICANT CHANGE UP (ref 80–100)
MCV RBC AUTO: 94.5 FL — SIGNIFICANT CHANGE UP (ref 80–100)
NRBC # BLD: 0 /100 WBCS — SIGNIFICANT CHANGE UP (ref 0–0)
NRBC # BLD: 0 /100 WBCS — SIGNIFICANT CHANGE UP (ref 0–0)
PHOSPHATE SERPL-MCNC: 2.8 MG/DL — SIGNIFICANT CHANGE UP (ref 2.5–4.5)
PHOSPHATE SERPL-MCNC: 2.8 MG/DL — SIGNIFICANT CHANGE UP (ref 2.5–4.5)
PLATELET # BLD AUTO: 101 K/UL — LOW (ref 150–400)
PLATELET # BLD AUTO: 95 K/UL — LOW (ref 150–400)
POTASSIUM SERPL-MCNC: 3.7 MMOL/L — SIGNIFICANT CHANGE UP (ref 3.5–5.3)
POTASSIUM SERPL-MCNC: 3.8 MMOL/L — SIGNIFICANT CHANGE UP (ref 3.5–5.3)
POTASSIUM SERPL-SCNC: 3.7 MMOL/L — SIGNIFICANT CHANGE UP (ref 3.5–5.3)
POTASSIUM SERPL-SCNC: 3.8 MMOL/L — SIGNIFICANT CHANGE UP (ref 3.5–5.3)
PROT SERPL-MCNC: 4.6 G/DL — LOW (ref 6–8.3)
PROT SERPL-MCNC: 5.1 G/DL — LOW (ref 6–8.3)
PROTHROM AB SERPL-ACNC: 10.5 SEC — SIGNIFICANT CHANGE UP (ref 10–12.9)
RBC # BLD: 3.46 M/UL — LOW (ref 4.2–5.8)
RBC # BLD: 3.48 M/UL — LOW (ref 4.2–5.8)
RBC # FLD: 14.5 % — SIGNIFICANT CHANGE UP (ref 10.3–14.5)
RBC # FLD: 14.6 % — HIGH (ref 10.3–14.5)
RH IG SCN BLD-IMP: POSITIVE — SIGNIFICANT CHANGE UP
SODIUM SERPL-SCNC: 142 MMOL/L — SIGNIFICANT CHANGE UP (ref 135–145)
SODIUM SERPL-SCNC: 143 MMOL/L — SIGNIFICANT CHANGE UP (ref 135–145)
WBC # BLD: 7.46 K/UL — SIGNIFICANT CHANGE UP (ref 3.8–10.5)
WBC # BLD: 8.34 K/UL — SIGNIFICANT CHANGE UP (ref 3.8–10.5)
WBC # FLD AUTO: 7.46 K/UL — SIGNIFICANT CHANGE UP (ref 3.8–10.5)
WBC # FLD AUTO: 8.34 K/UL — SIGNIFICANT CHANGE UP (ref 3.8–10.5)

## 2019-12-10 PROCEDURE — 71045 X-RAY EXAM CHEST 1 VIEW: CPT | Mod: 26

## 2019-12-10 PROCEDURE — 88307 TISSUE EXAM BY PATHOLOGIST: CPT | Mod: 26

## 2019-12-10 PROCEDURE — 99233 SBSQ HOSP IP/OBS HIGH 50: CPT

## 2019-12-10 PROCEDURE — 71045 X-RAY EXAM CHEST 1 VIEW: CPT | Mod: 26,77

## 2019-12-10 RX ORDER — VASOPRESSIN 20 [USP'U]/ML
0.03 INJECTION INTRAVENOUS
Qty: 50 | Refills: 0 | Status: DISCONTINUED | OUTPATIENT
Start: 2019-12-10 | End: 2019-12-10

## 2019-12-10 RX ORDER — POTASSIUM CHLORIDE 20 MEQ
20 PACKET (EA) ORAL ONCE
Refills: 0 | Status: COMPLETED | OUTPATIENT
Start: 2019-12-10 | End: 2019-12-10

## 2019-12-10 RX ADMIN — DEXMEDETOMIDINE HYDROCHLORIDE IN 0.9% SODIUM CHLORIDE 2.71 MICROGRAM(S)/KG/HR: 4 INJECTION INTRAVENOUS at 06:44

## 2019-12-10 RX ADMIN — HYDROMORPHONE HYDROCHLORIDE 0.5 MILLIGRAM(S): 2 INJECTION INTRAMUSCULAR; INTRAVENOUS; SUBCUTANEOUS at 15:20

## 2019-12-10 RX ADMIN — PIPERACILLIN AND TAZOBACTAM 25 GRAM(S): 4; .5 INJECTION, POWDER, LYOPHILIZED, FOR SOLUTION INTRAVENOUS at 05:25

## 2019-12-10 RX ADMIN — DEXMEDETOMIDINE HYDROCHLORIDE IN 0.9% SODIUM CHLORIDE 2.71 MICROGRAM(S)/KG/HR: 4 INJECTION INTRAVENOUS at 13:07

## 2019-12-10 RX ADMIN — Medication 1 MILLIGRAM(S): at 10:36

## 2019-12-10 RX ADMIN — SODIUM CHLORIDE 125 MILLILITER(S): 9 INJECTION, SOLUTION INTRAVENOUS at 10:36

## 2019-12-10 RX ADMIN — PIPERACILLIN AND TAZOBACTAM 25 GRAM(S): 4; .5 INJECTION, POWDER, LYOPHILIZED, FOR SOLUTION INTRAVENOUS at 13:06

## 2019-12-10 RX ADMIN — DEXMEDETOMIDINE HYDROCHLORIDE IN 0.9% SODIUM CHLORIDE 2.71 MICROGRAM(S)/KG/HR: 4 INJECTION INTRAVENOUS at 01:44

## 2019-12-10 RX ADMIN — SODIUM CHLORIDE 125 MILLILITER(S): 9 INJECTION, SOLUTION INTRAVENOUS at 01:44

## 2019-12-10 RX ADMIN — PANTOPRAZOLE SODIUM 40 MILLIGRAM(S): 20 TABLET, DELAYED RELEASE ORAL at 05:23

## 2019-12-10 RX ADMIN — CHLORHEXIDINE GLUCONATE 1 APPLICATION(S): 213 SOLUTION TOPICAL at 05:24

## 2019-12-10 RX ADMIN — HYDROMORPHONE HYDROCHLORIDE 0.5 MILLIGRAM(S): 2 INJECTION INTRAMUSCULAR; INTRAVENOUS; SUBCUTANEOUS at 08:26

## 2019-12-10 RX ADMIN — CHLORHEXIDINE GLUCONATE 15 MILLILITER(S): 213 SOLUTION TOPICAL at 05:24

## 2019-12-10 RX ADMIN — VASOPRESSIN 1.8 UNIT(S)/MIN: 20 INJECTION INTRAVENOUS at 01:44

## 2019-12-10 RX ADMIN — CHLORHEXIDINE GLUCONATE 15 MILLILITER(S): 213 SOLUTION TOPICAL at 13:06

## 2019-12-10 RX ADMIN — FENTANYL CITRATE 2.71 MICROGRAM(S)/KG/HR: 50 INJECTION INTRAVENOUS at 01:44

## 2019-12-10 RX ADMIN — HYDROMORPHONE HYDROCHLORIDE 0.5 MILLIGRAM(S): 2 INJECTION INTRAMUSCULAR; INTRAVENOUS; SUBCUTANEOUS at 08:41

## 2019-12-10 RX ADMIN — HYDROMORPHONE HYDROCHLORIDE 0.5 MILLIGRAM(S): 2 INJECTION INTRAMUSCULAR; INTRAVENOUS; SUBCUTANEOUS at 02:00

## 2019-12-10 RX ADMIN — DEXMEDETOMIDINE HYDROCHLORIDE IN 0.9% SODIUM CHLORIDE 2.71 MICROGRAM(S)/KG/HR: 4 INJECTION INTRAVENOUS at 10:36

## 2019-12-10 RX ADMIN — Medication 50 MILLIEQUIVALENT(S): at 18:07

## 2019-12-10 RX ADMIN — PANTOPRAZOLE SODIUM 40 MILLIGRAM(S): 20 TABLET, DELAYED RELEASE ORAL at 17:05

## 2019-12-10 RX ADMIN — HYDROMORPHONE HYDROCHLORIDE 0.5 MILLIGRAM(S): 2 INJECTION INTRAMUSCULAR; INTRAVENOUS; SUBCUTANEOUS at 15:05

## 2019-12-10 RX ADMIN — VASOPRESSIN 1.8 UNIT(S)/MIN: 20 INJECTION INTRAVENOUS at 18:07

## 2019-12-10 RX ADMIN — HYDROMORPHONE HYDROCHLORIDE 0.5 MILLIGRAM(S): 2 INJECTION INTRAMUSCULAR; INTRAVENOUS; SUBCUTANEOUS at 01:43

## 2019-12-10 RX ADMIN — ENOXAPARIN SODIUM 40 MILLIGRAM(S): 100 INJECTION SUBCUTANEOUS at 05:24

## 2019-12-10 NOTE — PROGRESS NOTE ADULT - SUBJECTIVE AND OBJECTIVE BOX
Subjective: Patient seen and examined. No new events except as noted.   Remains intubated in ICU.  Plan to go back to OR today for definitive procedure  off pressors   Bradycardic on Tele in the 50s    REVIEW OF SYSTEMS:  Unable to obtain     MEDICATIONS:  MEDICATIONS  (STANDING):  chlorhexidine 0.12% Liquid 15 milliLiter(s) Oral Mucosa <User Schedule>  chlorhexidine 2% Cloths 1 Application(s) Topical <User Schedule>  dexMEDEtomidine Infusion 0.2 MICROgram(s)/kG/Hr (2.71 mL/Hr) IV Continuous <Continuous>  enoxaparin Injectable 40 milliGRAM(s) SubCutaneous every 24 hours  fentaNYL   Infusion. 1 MICROgram(s)/kG/Hr (2.71 mL/Hr) IV Continuous <Continuous>  multiple electrolytes Injection Type 1 1000 milliLiter(s) (125 mL/Hr) IV Continuous <Continuous>  pantoprazole  Injectable 40 milliGRAM(s) IV Push every 12 hours  piperacillin/tazobactam IVPB.. 3.375 Gram(s) IV Intermittent every 8 hours      PHYSICAL EXAM:  T(C): 36.9 (12-10-19 @ 07:00), Max: 37 (12-09-19 @ 19:00)  HR: 59 (12-10-19 @ 12:01) (58 - 99)  BP: 98/59 (12-10-19 @ 12:00) (77/51 - 115/58)  RR: 23 (12-10-19 @ 12:00) (17 - 31)  SpO2: 96% (12-10-19 @ 12:01) (75% - 100%)  Wt(kg): --  I&O's Summary    09 Dec 2019 07:01  -  10 Dec 2019 07:00  --------------------------------------------------------  IN: 3974.6 mL / OUT: 1520 mL / NET: 2454.6 mL    10 Dec 2019 07:01  -  10 Dec 2019 12:26  --------------------------------------------------------  IN: 832.8 mL / OUT: 215 mL / NET: 617.8 mL        Appearance: Intubated, sedated  HEENT:   Dry oral mucosa   Lymphatic: No lymphadenopathy  Cardiovascular: Normal S1 S2, No JVD, No murmurs , Peripheral pulses palpable 2+ bilaterally  Respiratory: ventilated   Gastrointestinal:  Appropriately tender, +VAC  Skin: No rashes, No ecchymoses, No cyanosis, warm to touch  Musculoskeletal: Decreased  range of motion and strength  Psychiatry:  mildly sedated   Ext: No edema      LABS:    CARDIAC MARKERS:  CARDIAC MARKERS ( 08 Dec 2019 02:46 )  x     / x     / 1037 U/L / x     / 23.1 ng/mL                                10.6   8.34  )-----------( 101      ( 10 Dec 2019 03:33 )             32.7     12-10    142  |  109<H>  |  35<H>  ----------------------------<  108<H>  3.8   |  21<L>  |  0.95    Ca    7.4<L>      10 Dec 2019 03:33  Phos  2.8     12-10  Mg     2.9     12-10    TPro  5.1<L>  /  Alb  2.1<L>  /  TBili  0.7  /  DBili  0.3<H>  /  AST  33  /  ALT  25  /  AlkPhos  41  12-10    proBNP:   Lipid Profile:   HgA1c:   TSH:             TELEMETRY: 	Sinus bradycardia     ECG:  	  RADIOLOGY: < from: Xray Chest 1 View- PORTABLE-Routine (12.10.19 @ 07:05) >    EXAM:  XR CHEST PORTABLE ROUTINE 1V                            PROCEDURE DATE:  12/10/2019            INTERPRETATION:  AP chest x-ray at 0703 hours on 12/10/2019.    Clinical information: Hematemesis, atelectasis.    Comparison: Chest x-ray dated 12/9/2019.    Findings: Endotracheal tube, enteric tube, and right internal jugular   vein central venous catheter persist unchanged.    Heart size is normal. Thoracic aorta is calcified. Pulmonary vascularity   appears normal. No consolidation or pleural effusion is present.    Impression: Clear lungs.      ANNETTA FERRIERA M.D., ATTENDING RADIOLOGIST  This document has been electronically signed. Dec 10 2019  9:28AM           DIAGNOSTIC TESTING:  [ ] Echocardiogram:  [ ]  Catheterization:  [ ] Stress Test:    OTHER:

## 2019-12-10 NOTE — PROGRESS NOTE ADULT - SUBJECTIVE AND OBJECTIVE BOX
GENERAL SURGERY DAILY PROGRESS NOTE:    Interval:  24 HOUR EVENTS:  - IV fluids changed to plasmalyte, running at 125   - Extubation held off, plan to go back to OR today 12/10    Subjective:  No acute events overnight  Patient seen and examined this am.     ICU Vital Signs Last 24 Hrs  T(C): 36.9 (10 Dec 2019 07:00), Max: 37 (09 Dec 2019 19:00)  T(F): 98.4 (10 Dec 2019 07:00), Max: 98.6 (09 Dec 2019 19:00)  HR: 64 (10 Dec 2019 10:00) (58 - 99)  BP: 77/51 (10 Dec 2019 05:00) (77/51 - 115/58)  BP(mean): 60 (10 Dec 2019 05:00) (60 - 81)  ABP: 102/78 (10 Dec 2019 10:00) (71/55 - 129/62)  ABP(mean): 89 (10 Dec 2019 10:00) (57 - 125)  RR: 23 (10 Dec 2019 10:00) (17 - 31)  SpO2: 93% (10 Dec 2019 10:00) (75% - 100%)    Exam:  Gen: NAD, resting in bed  Resp: Intubated  Abd: Soft, ND, VAC in place. No erythema/induration. NGT  : Martinez  Ext: No edema, WWP wrist restraints ICDs    LABS:                        10.6   8.34  )-----------( 101      ( 10 Dec 2019 03:33 )             32.7     12-10    142  |  109<H>  |  35<H>  ----------------------------<  108<H>  3.8   |  21<L>  |  0.95    Ca    7.4<L>      10 Dec 2019 03:33  Phos  2.8     12-10  Mg     2.9     12-10    TPro  5.1<L>  /  Alb  2.1<L>  /  TBili  0.7  /  DBili  0.3<H>  /  AST  33  /  ALT  25  /  AlkPhos  41  12-10    YAMILET Appiah, PGY-1  Red Team Surgery  [id number] with any questions

## 2019-12-10 NOTE — PROGRESS NOTE ADULT - ASSESSMENT
75 y/o M presenting with septic shock and high grade SBO s/p exploratory laparotomy, lysis of adhesions, decompression of bowel via enterotomy w/ primary repair, and Abthera VAC placement on 12/6; s/p take down of Abthera, washout and re-application of the Abthera vac on 12/8.     PLAN:    Neuro: acute pain  - Precedex for sedation while intubated  - Fentanyl drip for analgesia    Resp: acute respiratory failure  - Continue mechanical ventilatory support  - Wean mechanical vent, SBT this AM but not to be extubated till after OR on 12/10   - Trend ABG and lactate q4 hours  - Daily CXR while intubated  - Duonebs    CV: septic shock requiring vasopressor support  - Wean vasopressin as tolerated for MAP >65  - Naeem Trac, CVP, trend lactate - 1.8 most recently    GI: SBO s/p exploratory laparotomy, lysis of adhesions, decompression of bowel via enterotomy w/ primary repair, and Abthera VAC placement  - NPO/ NGT  - TPN consultation  - Protonix for stress ulcer prophylaxis  - PPI BID  - Plan for RTOR today, 12/10    Renal: CHI   - Plasmalyte @125  - Martinez, strict I&Os    Heme: no acute issues  - Lovenox for VTE prophylaxis  - Venodynes    ID: septic shock 2/2 intra abdominal infection  - Continue Zosyn    Endo: no acute issues  - Monitor glucose on BMP

## 2019-12-10 NOTE — PROGRESS NOTE ADULT - PROBLEM SELECTOR PLAN 1
s/p ex lap, MIRYAM, closure of enterotomy, abthera vac placement  s/p washout  plan for RTOR today for definitive procedure

## 2019-12-10 NOTE — PROGRESS NOTE ADULT - SUBJECTIVE AND OBJECTIVE BOX
HISTORY  SICU DAILY PROGRESS NOTE    74 year old male with a past medical history of COPD and EtOH dependence who presented today with abdominal pain, nausea, hematemesis, and poor PO intake for ~2-3 days. Labs significant for an CHI w/ Cr 2.74 and lactate of 9.4. He was also notably tachycardic to the 110s and hypotensive w/ SBP in the 70s. He was given 2 units of PRBCs and 2 L of LR in the ED due to concern for upper GI bleeding. Imaging revealed high grade SBO in the RLQ. Patient was taken to the OR emergently for an exploratory laparotomy, lysis of adhesions, decompression of bowel via enterotomy w/ primary repair, and Abthera VAC placement. Of note, the distal 50% of the bowel appeared dusky but viable. He required vasopressor support with phenylephrine and vasopressin infusions. He received 3000 mL of crystalloid w/ EBL of 10 mL and UOP of 25 mL. Patient was left intubated at the end of the case so SICU was consulted for hemodynamic monitoring.  Taken to the OR on 12/6 and underwent Ex-lap with MIRYAM, enterotomy for bowel decompression and Abthera VAC placement.   Taken back to the OR on 12/8 and underwent take down of Abthera, washout and re-application of the Abthera vac.   Plan to go to OR again on 12/10.       24 HOUR EVENTS:  - IV fluids changed to plasmalyte, running at 125   - Extubation held off, plan to go back to OR tomorrow    SUBJECTIVE/ROS:    [ ] A ten-point review of systems was otherwise negative except as noted.  [x] Due to altered mental status/intubation, subjective information were not able to be obtained from the patient. History was obtained, to the extent possible, from review of the chart and collateral sources of information.      NEURO  Exam: intubated/sedated but alert and able to follow commands   Meds: dexMEDEtomidine Infusion 0.2 MICROgram(s)/kG/Hr IV Continuous <Continuous>  fentaNYL   Infusion. 1 MICROgram(s)/kG/Hr IV Continuous <Continuous>  HYDROmorphone  Injectable 0.5 milliGRAM(s) IV Push every 3 hours PRN Pain    [x] Adequacy of sedation and pain control has been assessed and adjusted      RESPIRATORY  RR: 22 (12-10-19 @ 02:30) (18 - 28)  SpO2: 95% (12-10-19 @ 02:30) (75% - 100%)  Wt(kg): --  Exam: unlabored, clear to auscultation bilaterally  Mechanical Ventilation: Mode: AC/ CMV (Assist Control/ Continuous Mandatory Ventilation), RR (machine): 22, RR (patient): 24, TV (machine): 450, FiO2: 30, PEEP: 5, ITime: 1, MAP: 9, PIP: 18  ABG - ( 09 Dec 2019 02:15 )  pH: 7.41  /  pCO2: 36    /  pO2: 100   / HCO3: 22    / Base Excess: -1.2  /  SaO2: 98      Lactate: x                [ ] Extubation Readiness Assessed  Meds:       CARDIOVASCULAR  HR: 63 (12-10-19 @ 02:30) (58 - 79)  BP: 115/58 (12-10-19 @ 00:30) (95/54 - 121/62)  BP(mean): 81 (12-10-19 @ 00:30) (67 - 86)  ABP: 118/56 (12-10-19 @ 02:30) (77/43 - 134/62)  ABP(mean): 80 (12-10-19 @ 02:30) (57 - 125)  Wt(kg): --  CVP(cm H2O): --      Exam: RRR, s1s2 present   Perfusion     [x]Adequate   [ ]Inadequate  Mentation   [x]Normal       [ ]Reduced  Extremities  [x]Warm         [ ]Cool    Meds:       GI/NUTRITION  Exam: non distended, diffusely tender   Diet: NPO  Meds: pantoprazole  Injectable 40 milliGRAM(s) IV Push every 12 hours      GENITOURINARY  I&O's Detail    12-08 @ 07:01  -  12-09 @ 07:00  --------------------------------------------------------  IN:    dexmedetomidine Infusion: 126.2 mL    dexmedetomidine Infusion: 127.1 mL    dextrose 5% + lactated ringers.: 750 mL    Enteral Tube Flush: 150 mL    fentaNYL Infusion.: 35.1 mL    fentaNYL Infusion.: 27 mL    IV PiggyBack: 225 mL    lactated ringers.: 1200 mL    phenylephrine   Infusion: 18.1 mL    phenylephrine   Infusion: 7.2 mL    phenylephrine   Infusion: 18.3 mL    Plasma: 500 mL    Solution: 500 mL    vasopressin Infusion: 18 mL    vasopressin Infusion: 21.6 mL  Total IN: 3723.6 mL    OUT:    Indwelling Catheter - Urethral: 660 mL    Nasoenteral Tube: 100 mL    VAC (Vacuum Assisted Closure) System: 1350 mL  Total OUT: 2110 mL    Total NET: 1613.6 mL      12-09 @ 07:01  -  12-10 @ 02:58  --------------------------------------------------------  IN:    dexmedetomidine Infusion: 205.6 mL    Enteral Tube Flush: 70 mL    fentaNYL Infusion.: 51.3 mL    IV PiggyBack: 250 mL    lactated ringers.: 300 mL    multiple electrolytes Injection Type 1: 2000 mL    Solution: 250 mL    vasopressin Infusion: 34.2 mL  Total IN: 3161.1 mL    OUT:    Indwelling Catheter - Urethral: 430 mL    Nasoenteral Tube: 100 mL    VAC (Vacuum Assisted Closure) System: 300 mL  Total OUT: 830 mL    Total NET: 2331.1 mL          12-09    142  |  108  |  36<H>  ----------------------------<  131<H>  4.0   |  20<L>  |  1.03    Ca    7.7<L>      09 Dec 2019 18:40  Phos  3.3     12-09  Mg     2.9     12-09    TPro  5.1<L>  /  Alb  2.2<L>  /  TBili  0.7  /  DBili  0.4<H>  /  AST  34  /  ALT  28  /  AlkPhos  39<L>  12-09    [ ] Martinez catheter, indication: N/A  Meds: multiple electrolytes Injection Type 1 1000 milliLiter(s) IV Continuous <Continuous>        HEMATOLOGIC  Meds: enoxaparin Injectable 40 milliGRAM(s) SubCutaneous every 24 hours    [x] VTE Prophylaxis                        10.6   8.26  )-----------( 110      ( 09 Dec 2019 18:40 )             31.6     PT/INR - ( 09 Dec 2019 02:24 )   PT: 12.8 sec;   INR: 1.11 ratio         PTT - ( 09 Dec 2019 02:24 )  PTT:37.2 sec  Transfusion     [ ] PRBC   [ ] Platelets   [ ] FFP   [ ] Cryoprecipitate      INFECTIOUS DISEASES  T(C): 36.8 (12-09-19 @ 23:00), Max: 37 (12-09-19 @ 19:00)  Wt(kg): --  WBC Count: 8.26 K/uL (12-09 @ 18:40)  WBC Count: 8.28 K/uL (12-09 @ 10:50)    Recent Cultures:  Specimen Source: .Blood Blood, 12-06 @ 22:20; Results   No growth to date.; Gram Stain: --; Organism: --    Meds: piperacillin/tazobactam IVPB.. 3.375 Gram(s) IV Intermittent every 8 hours        ENDOCRINE  Capillary Blood Glucose    Meds: vasopressin Infusion 0.03 Unit(s)/Min IV Continuous <Continuous>        ACCESS DEVICES:  [ ] Peripheral IV  [ ] Central Venous Line	[ ] R	[ ] L	[ ] IJ	[ ] Fem	[ ] SC	Placed:   [ ] Arterial Line		[ ] R	[ ] L	[ ] Fem	[ ] Rad	[ ] Ax	Placed:   [ ] PICC:					[ ] Mediport  [ ] Urinary Catheter, Date Placed:   [ ] Necessity of urinary, arterial, and venous catheters discussed    OTHER MEDICATIONS:  chlorhexidine 0.12% Liquid 15 milliLiter(s) Oral Mucosa <User Schedule>  chlorhexidine 2% Cloths 1 Application(s) Topical <User Schedule>

## 2019-12-10 NOTE — PROGRESS NOTE ADULT - ATTENDING COMMENTS
73 y/o M presenting with septic shock and high grade SBO s/p exploratory laparotomy, lysis of adhesions, decompression of bowel via enterotomy w/ primary repair, and Abthera VAC placement on 12/6; s/p take down of Abthera, washout and re-application of the Abthera vac on 12/8 sec to patchy ischemic small bowel  Continue to be sedated with precedex drip gtt, pain control with fentanyl drip  Vent setting appropriate  Off neosynephrine, MAP > 70, can wean off vasopressin Vasopressin  IVF plasmalyte   Continue Zosyn for 7 day course for bowel ischemia  Possible OR today  Discussed with Dr Siegel    Discussed with Dr Siegel

## 2019-12-10 NOTE — PROGRESS NOTE ADULT - ASSESSMENT
74M POD4 s/p ex lap, MIRYAM, closure of enterotomy, abthera vac placement POD2 ex lap abd washout abthera placement    Plan:  - Diet: NPO  - Labs: f/u post op AM labs  - Monitor K Cr and lactate  - c/w zosyn  - Pain medication  - Pressor support, wean  - DVT ppx  - CIWA  - Activity: bed rest  - supportive care per SICU  - TPN per SICU  - RTOR today 12/10 for definitive procedure,    Red Team Surg  [phone]

## 2019-12-11 LAB
ALBUMIN SERPL ELPH-MCNC: 1.9 G/DL — LOW (ref 3.3–5)
ALBUMIN SERPL ELPH-MCNC: 2.2 G/DL — LOW (ref 3.3–5)
ALP SERPL-CCNC: 41 U/L — SIGNIFICANT CHANGE UP (ref 40–120)
ALP SERPL-CCNC: 44 U/L — SIGNIFICANT CHANGE UP (ref 40–120)
ALT FLD-CCNC: 27 U/L — SIGNIFICANT CHANGE UP (ref 10–45)
ALT FLD-CCNC: 30 U/L — SIGNIFICANT CHANGE UP (ref 10–45)
ANION GAP SERPL CALC-SCNC: 16 MMOL/L — SIGNIFICANT CHANGE UP (ref 5–17)
ANION GAP SERPL CALC-SCNC: 18 MMOL/L — HIGH (ref 5–17)
APTT BLD: 22.5 SEC — LOW (ref 27.5–36.3)
AST SERPL-CCNC: 37 U/L — SIGNIFICANT CHANGE UP (ref 10–40)
AST SERPL-CCNC: 39 U/L — SIGNIFICANT CHANGE UP (ref 10–40)
BILIRUB DIRECT SERPL-MCNC: 0.3 MG/DL — HIGH (ref 0–0.2)
BILIRUB DIRECT SERPL-MCNC: 0.4 MG/DL — HIGH (ref 0–0.2)
BILIRUB INDIRECT FLD-MCNC: 0.3 MG/DL — SIGNIFICANT CHANGE UP (ref 0.2–1)
BILIRUB INDIRECT FLD-MCNC: 0.4 MG/DL — SIGNIFICANT CHANGE UP (ref 0.2–1)
BILIRUB SERPL-MCNC: 0.6 MG/DL — SIGNIFICANT CHANGE UP (ref 0.2–1.2)
BILIRUB SERPL-MCNC: 0.8 MG/DL — SIGNIFICANT CHANGE UP (ref 0.2–1.2)
BUN SERPL-MCNC: 22 MG/DL — SIGNIFICANT CHANGE UP (ref 7–23)
BUN SERPL-MCNC: 29 MG/DL — HIGH (ref 7–23)
CALCIUM SERPL-MCNC: 6.9 MG/DL — LOW (ref 8.4–10.5)
CALCIUM SERPL-MCNC: 7.2 MG/DL — LOW (ref 8.4–10.5)
CHLORIDE SERPL-SCNC: 105 MMOL/L — SIGNIFICANT CHANGE UP (ref 96–108)
CHLORIDE SERPL-SCNC: 107 MMOL/L — SIGNIFICANT CHANGE UP (ref 96–108)
CO2 SERPL-SCNC: 19 MMOL/L — LOW (ref 22–31)
CO2 SERPL-SCNC: 20 MMOL/L — LOW (ref 22–31)
CREAT SERPL-MCNC: 0.67 MG/DL — SIGNIFICANT CHANGE UP (ref 0.5–1.3)
CREAT SERPL-MCNC: 0.76 MG/DL — SIGNIFICANT CHANGE UP (ref 0.5–1.3)
CULTURE RESULTS: SIGNIFICANT CHANGE UP
CULTURE RESULTS: SIGNIFICANT CHANGE UP
GAS PNL BLDA: SIGNIFICANT CHANGE UP
GLUCOSE SERPL-MCNC: 105 MG/DL — HIGH (ref 70–99)
GLUCOSE SERPL-MCNC: 92 MG/DL — SIGNIFICANT CHANGE UP (ref 70–99)
HCT VFR BLD CALC: 33.7 % — LOW (ref 39–50)
HCT VFR BLD CALC: 35.3 % — LOW (ref 39–50)
HGB BLD-MCNC: 10.6 G/DL — LOW (ref 13–17)
HGB BLD-MCNC: 11.6 G/DL — LOW (ref 13–17)
INR BLD: 0.91 RATIO — SIGNIFICANT CHANGE UP (ref 0.88–1.16)
MAGNESIUM SERPL-MCNC: 2.7 MG/DL — HIGH (ref 1.6–2.6)
MAGNESIUM SERPL-MCNC: 3 MG/DL — HIGH (ref 1.6–2.6)
MCHC RBC-ENTMCNC: 30 PG — SIGNIFICANT CHANGE UP (ref 27–34)
MCHC RBC-ENTMCNC: 30.4 PG — SIGNIFICANT CHANGE UP (ref 27–34)
MCHC RBC-ENTMCNC: 31.5 GM/DL — LOW (ref 32–36)
MCHC RBC-ENTMCNC: 32.9 GM/DL — SIGNIFICANT CHANGE UP (ref 32–36)
MCV RBC AUTO: 92.4 FL — SIGNIFICANT CHANGE UP (ref 80–100)
MCV RBC AUTO: 95.5 FL — SIGNIFICANT CHANGE UP (ref 80–100)
NRBC # BLD: 0 /100 WBCS — SIGNIFICANT CHANGE UP (ref 0–0)
NRBC # BLD: 0 /100 WBCS — SIGNIFICANT CHANGE UP (ref 0–0)
PHOSPHATE SERPL-MCNC: 2.3 MG/DL — LOW (ref 2.5–4.5)
PHOSPHATE SERPL-MCNC: 3.4 MG/DL — SIGNIFICANT CHANGE UP (ref 2.5–4.5)
PLATELET # BLD AUTO: 126 K/UL — LOW (ref 150–400)
PLATELET # BLD AUTO: 152 K/UL — SIGNIFICANT CHANGE UP (ref 150–400)
POTASSIUM SERPL-MCNC: 3.8 MMOL/L — SIGNIFICANT CHANGE UP (ref 3.5–5.3)
POTASSIUM SERPL-MCNC: 4.1 MMOL/L — SIGNIFICANT CHANGE UP (ref 3.5–5.3)
POTASSIUM SERPL-SCNC: 3.8 MMOL/L — SIGNIFICANT CHANGE UP (ref 3.5–5.3)
POTASSIUM SERPL-SCNC: 4.1 MMOL/L — SIGNIFICANT CHANGE UP (ref 3.5–5.3)
PROT SERPL-MCNC: 4.5 G/DL — LOW (ref 6–8.3)
PROT SERPL-MCNC: 4.9 G/DL — LOW (ref 6–8.3)
PROTHROM AB SERPL-ACNC: 10.4 SEC — SIGNIFICANT CHANGE UP (ref 10–12.9)
RBC # BLD: 3.53 M/UL — LOW (ref 4.2–5.8)
RBC # BLD: 3.82 M/UL — LOW (ref 4.2–5.8)
RBC # FLD: 14.5 % — SIGNIFICANT CHANGE UP (ref 10.3–14.5)
RBC # FLD: 14.6 % — HIGH (ref 10.3–14.5)
SODIUM SERPL-SCNC: 142 MMOL/L — SIGNIFICANT CHANGE UP (ref 135–145)
SODIUM SERPL-SCNC: 143 MMOL/L — SIGNIFICANT CHANGE UP (ref 135–145)
SPECIMEN SOURCE: SIGNIFICANT CHANGE UP
SPECIMEN SOURCE: SIGNIFICANT CHANGE UP
WBC # BLD: 11.89 K/UL — HIGH (ref 3.8–10.5)
WBC # BLD: 14.63 K/UL — HIGH (ref 3.8–10.5)
WBC # FLD AUTO: 11.89 K/UL — HIGH (ref 3.8–10.5)
WBC # FLD AUTO: 14.63 K/UL — HIGH (ref 3.8–10.5)

## 2019-12-11 PROCEDURE — 71045 X-RAY EXAM CHEST 1 VIEW: CPT | Mod: 26,76

## 2019-12-11 PROCEDURE — 93010 ELECTROCARDIOGRAM REPORT: CPT | Mod: 76

## 2019-12-11 PROCEDURE — 99233 SBSQ HOSP IP/OBS HIGH 50: CPT

## 2019-12-11 RX ORDER — POTASSIUM CHLORIDE 20 MEQ
20 PACKET (EA) ORAL ONCE
Refills: 0 | Status: COMPLETED | OUTPATIENT
Start: 2019-12-11 | End: 2019-12-11

## 2019-12-11 RX ORDER — CHLORHEXIDINE GLUCONATE 213 G/1000ML
1 SOLUTION TOPICAL DAILY
Refills: 0 | Status: DISCONTINUED | OUTPATIENT
Start: 2019-12-11 | End: 2019-12-12

## 2019-12-11 RX ORDER — DEXMEDETOMIDINE HYDROCHLORIDE IN 0.9% SODIUM CHLORIDE 4 UG/ML
0.2 INJECTION INTRAVENOUS
Qty: 200 | Refills: 0 | Status: DISCONTINUED | OUTPATIENT
Start: 2019-12-11 | End: 2019-12-11

## 2019-12-11 RX ORDER — SODIUM CHLORIDE 9 MG/ML
1000 INJECTION, SOLUTION INTRAVENOUS
Refills: 0 | Status: DISCONTINUED | OUTPATIENT
Start: 2019-12-11 | End: 2019-12-11

## 2019-12-11 RX ORDER — METOPROLOL TARTRATE 50 MG
5 TABLET ORAL ONCE
Refills: 0 | Status: COMPLETED | OUTPATIENT
Start: 2019-12-11 | End: 2019-12-11

## 2019-12-11 RX ORDER — CALCIUM GLUCONATE 100 MG/ML
2 VIAL (ML) INTRAVENOUS ONCE
Refills: 0 | Status: COMPLETED | OUTPATIENT
Start: 2019-12-11 | End: 2019-12-11

## 2019-12-11 RX ORDER — ENOXAPARIN SODIUM 100 MG/ML
40 INJECTION SUBCUTANEOUS DAILY
Refills: 0 | Status: DISCONTINUED | OUTPATIENT
Start: 2019-12-11 | End: 2019-12-24

## 2019-12-11 RX ORDER — ACETAMINOPHEN 500 MG
1000 TABLET ORAL EVERY 6 HOURS
Refills: 0 | Status: DISCONTINUED | OUTPATIENT
Start: 2019-12-11 | End: 2019-12-16

## 2019-12-11 RX ORDER — NOREPINEPHRINE BITARTRATE/D5W 8 MG/250ML
0.15 PLASTIC BAG, INJECTION (ML) INTRAVENOUS
Qty: 8 | Refills: 0 | Status: DISCONTINUED | OUTPATIENT
Start: 2019-12-11 | End: 2019-12-12

## 2019-12-11 RX ORDER — IPRATROPIUM/ALBUTEROL SULFATE 18-103MCG
3 AEROSOL WITH ADAPTER (GRAM) INHALATION EVERY 6 HOURS
Refills: 0 | Status: DISCONTINUED | OUTPATIENT
Start: 2019-12-11 | End: 2020-01-10

## 2019-12-11 RX ORDER — PANTOPRAZOLE SODIUM 20 MG/1
40 TABLET, DELAYED RELEASE ORAL DAILY
Refills: 0 | Status: DISCONTINUED | OUTPATIENT
Start: 2019-12-11 | End: 2019-12-12

## 2019-12-11 RX ORDER — SODIUM CHLORIDE 9 MG/ML
1000 INJECTION, SOLUTION INTRAVENOUS
Refills: 0 | Status: DISCONTINUED | OUTPATIENT
Start: 2019-12-11 | End: 2019-12-14

## 2019-12-11 RX ORDER — VASOPRESSIN 20 [USP'U]/ML
0.03 INJECTION INTRAVENOUS
Qty: 50 | Refills: 0 | Status: DISCONTINUED | OUTPATIENT
Start: 2019-12-11 | End: 2019-12-12

## 2019-12-11 RX ORDER — KETOROLAC TROMETHAMINE 30 MG/ML
15 SYRINGE (ML) INJECTION EVERY 6 HOURS
Refills: 0 | Status: DISCONTINUED | OUTPATIENT
Start: 2019-12-11 | End: 2019-12-11

## 2019-12-11 RX ORDER — PIPERACILLIN AND TAZOBACTAM 4; .5 G/20ML; G/20ML
3.38 INJECTION, POWDER, LYOPHILIZED, FOR SOLUTION INTRAVENOUS EVERY 8 HOURS
Refills: 0 | Status: DISCONTINUED | OUTPATIENT
Start: 2019-12-11 | End: 2019-12-15

## 2019-12-11 RX ORDER — FENTANYL CITRATE 50 UG/ML
1 INJECTION INTRAVENOUS
Qty: 2500 | Refills: 0 | Status: DISCONTINUED | OUTPATIENT
Start: 2019-12-11 | End: 2019-12-11

## 2019-12-11 RX ORDER — NOREPINEPHRINE BITARTRATE/D5W 8 MG/250ML
1.5 PLASTIC BAG, INJECTION (ML) INTRAVENOUS
Qty: 8 | Refills: 0 | Status: DISCONTINUED | OUTPATIENT
Start: 2019-12-11 | End: 2019-12-11

## 2019-12-11 RX ORDER — CHLORHEXIDINE GLUCONATE 213 G/1000ML
15 SOLUTION TOPICAL EVERY 12 HOURS
Refills: 0 | Status: DISCONTINUED | OUTPATIENT
Start: 2019-12-11 | End: 2019-12-11

## 2019-12-11 RX ORDER — BUDESONIDE, MICRONIZED 100 %
0.5 POWDER (GRAM) MISCELLANEOUS EVERY 12 HOURS
Refills: 0 | Status: DISCONTINUED | OUTPATIENT
Start: 2019-12-11 | End: 2020-01-10

## 2019-12-11 RX ORDER — FENTANYL CITRATE 50 UG/ML
50 INJECTION INTRAVENOUS
Refills: 0 | Status: DISCONTINUED | OUTPATIENT
Start: 2019-12-11 | End: 2019-12-11

## 2019-12-11 RX ORDER — FENTANYL CITRATE 50 UG/ML
25 INJECTION INTRAVENOUS
Refills: 0 | Status: DISCONTINUED | OUTPATIENT
Start: 2019-12-11 | End: 2019-12-11

## 2019-12-11 RX ORDER — METOPROLOL TARTRATE 50 MG
5 TABLET ORAL EVERY 6 HOURS
Refills: 0 | Status: DISCONTINUED | OUTPATIENT
Start: 2019-12-12 | End: 2019-12-12

## 2019-12-11 RX ORDER — VASOPRESSIN 20 [USP'U]/ML
0.03 INJECTION INTRAVENOUS
Qty: 50 | Refills: 0 | Status: DISCONTINUED | OUTPATIENT
Start: 2019-12-11 | End: 2019-12-11

## 2019-12-11 RX ORDER — HYDROMORPHONE HYDROCHLORIDE 2 MG/ML
0.5 INJECTION INTRAMUSCULAR; INTRAVENOUS; SUBCUTANEOUS
Refills: 0 | Status: DISCONTINUED | OUTPATIENT
Start: 2019-12-11 | End: 2019-12-11

## 2019-12-11 RX ADMIN — VASOPRESSIN 1.8 UNIT(S)/MIN: 20 INJECTION INTRAVENOUS at 07:53

## 2019-12-11 RX ADMIN — FENTANYL CITRATE 25 MICROGRAM(S): 50 INJECTION INTRAVENOUS at 06:17

## 2019-12-11 RX ADMIN — PIPERACILLIN AND TAZOBACTAM 25 GRAM(S): 4; .5 INJECTION, POWDER, LYOPHILIZED, FOR SOLUTION INTRAVENOUS at 22:13

## 2019-12-11 RX ADMIN — HYDROMORPHONE HYDROCHLORIDE 0.5 MILLIGRAM(S): 2 INJECTION INTRAMUSCULAR; INTRAVENOUS; SUBCUTANEOUS at 07:39

## 2019-12-11 RX ADMIN — Medication 5 MILLIGRAM(S): at 18:37

## 2019-12-11 RX ADMIN — HYDROMORPHONE HYDROCHLORIDE 0.5 MILLIGRAM(S): 2 INJECTION INTRAMUSCULAR; INTRAVENOUS; SUBCUTANEOUS at 07:55

## 2019-12-11 RX ADMIN — HYDROMORPHONE HYDROCHLORIDE 0.5 MILLIGRAM(S): 2 INJECTION INTRAMUSCULAR; INTRAVENOUS; SUBCUTANEOUS at 14:14

## 2019-12-11 RX ADMIN — CHLORHEXIDINE GLUCONATE 15 MILLILITER(S): 213 SOLUTION TOPICAL at 06:18

## 2019-12-11 RX ADMIN — FENTANYL CITRATE 25 MICROGRAM(S): 50 INJECTION INTRAVENOUS at 06:30

## 2019-12-11 RX ADMIN — SODIUM CHLORIDE 125 MILLILITER(S): 9 INJECTION, SOLUTION INTRAVENOUS at 07:53

## 2019-12-11 RX ADMIN — PANTOPRAZOLE SODIUM 40 MILLIGRAM(S): 20 TABLET, DELAYED RELEASE ORAL at 11:46

## 2019-12-11 RX ADMIN — PIPERACILLIN AND TAZOBACTAM 25 GRAM(S): 4; .5 INJECTION, POWDER, LYOPHILIZED, FOR SOLUTION INTRAVENOUS at 14:15

## 2019-12-11 RX ADMIN — DEXMEDETOMIDINE HYDROCHLORIDE IN 0.9% SODIUM CHLORIDE 2.71 MICROGRAM(S)/KG/HR: 4 INJECTION INTRAVENOUS at 07:53

## 2019-12-11 RX ADMIN — HYDROMORPHONE HYDROCHLORIDE 0.5 MILLIGRAM(S): 2 INJECTION INTRAMUSCULAR; INTRAVENOUS; SUBCUTANEOUS at 14:30

## 2019-12-11 RX ADMIN — Medication 15.24 MICROGRAM(S)/KG/MIN: at 07:53

## 2019-12-11 RX ADMIN — Medication 0.5 MILLIGRAM(S): at 17:53

## 2019-12-11 RX ADMIN — Medication 5 MILLIGRAM(S): at 23:55

## 2019-12-11 RX ADMIN — Medication 200 GRAM(S): at 11:46

## 2019-12-11 RX ADMIN — PIPERACILLIN AND TAZOBACTAM 25 GRAM(S): 4; .5 INJECTION, POWDER, LYOPHILIZED, FOR SOLUTION INTRAVENOUS at 06:17

## 2019-12-11 RX ADMIN — Medication 100 MILLIEQUIVALENT(S): at 06:18

## 2019-12-11 RX ADMIN — ENOXAPARIN SODIUM 40 MILLIGRAM(S): 100 INJECTION SUBCUTANEOUS at 11:46

## 2019-12-11 RX ADMIN — Medication 3 MILLILITER(S): at 17:53

## 2019-12-11 NOTE — PROGRESS NOTE ADULT - ATTENDING COMMENTS
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

## 2019-12-11 NOTE — PROGRESS NOTE ADULT - SUBJECTIVE AND OBJECTIVE BOX
Subjective: Patient seen and examined. No new events except as noted.   remains in ICU   24 HOUR EVENTS:  - Went back to the OR with Dr. GISELE Guerra on 12/10: Resected about 150cm of small bowel, 150cm of small bowel remaining, all pink/healthy/peristalsing. Patchy areas of ischemia on cecum, so proximal ascending colon resected. +End ileostomy, JOVANNA drain placed - travels across lower quadrants and ends in right upper quadrant. EBL was 50ml, given 1L crystalloid intraop, 150 cc urine output.   - Brought back from the OR on same amount of vaso(.03) and levo (.16) as prior to surgery.   - A line from right radial artery removed, A line place in left radial artery.   - Right IJ Triple lumen accidentally pulled out partially in OR, CXR obtained and is out 2 cm, but still functional. Not replaced.     REVIEW OF SYSTEMS:  Unable to obtain         MEDICATIONS:  MEDICATIONS  (STANDING):  chlorhexidine 0.12% Liquid 15 milliLiter(s) Oral Mucosa every 12 hours  chlorhexidine 2% Cloths 1 Application(s) Topical daily  dextrose 5% + lactated ringers. 1000 milliLiter(s) (100 mL/Hr) IV Continuous <Continuous>  enoxaparin Injectable 40 milliGRAM(s) SubCutaneous daily  norepinephrine Infusion 0.15 MICROgram(s)/kG/Min (15.244 mL/Hr) IV Continuous <Continuous>  pantoprazole  Injectable 40 milliGRAM(s) IV Push daily  piperacillin/tazobactam IVPB.. 3.375 Gram(s) IV Intermittent every 8 hours  vasopressin Infusion 0.03 Unit(s)/Min (1.8 mL/Hr) IV Continuous <Continuous>      PHYSICAL EXAM:  T(C): 37.5 (12-11-19 @ 11:00), Max: 37.5 (12-11-19 @ 11:00)  HR: 106 (12-11-19 @ 12:36) (53 - 126)  BP: 91/40 (12-11-19 @ 06:45) (80/51 - 133/63)  RR: 29 (12-11-19 @ 12:30) (18 - 32)  SpO2: 94% (12-11-19 @ 12:36) (93% - 100%)  Wt(kg): --  I&O's Summary    10 Dec 2019 07:01  -  11 Dec 2019 07:00  --------------------------------------------------------  IN: 3470.3 mL / OUT: 1825 mL / NET: 1645.3 mL    11 Dec 2019 07:01  -  11 Dec 2019 12:39  --------------------------------------------------------  IN: 750 mL / OUT: 190 mL / NET: 560 mL        Appearance: Intubated, sedated  HEENT:   Dry oral mucosa   Lymphatic: No lymphadenopathy  Cardiovascular: Normal S1 S2, No JVD, No murmurs , Peripheral pulses palpable 2+ bilaterally  Respiratory: ventilated   Gastrointestinal:  Appropriately tender, +VAC  Skin: No rashes, No ecchymoses, No cyanosis, warm to touch  Musculoskeletal: Decreased  range of motion and strength  Psychiatry:  mildly sedated   Ext: No edema        LABS:    CARDIAC MARKERS:                                10.6   11.89 )-----------( 126      ( 11 Dec 2019 11:58 )             33.7     12-11    143  |  107  |  22  ----------------------------<  105<H>  4.1   |  20<L>  |  0.67    Ca    6.9<L>      11 Dec 2019 11:58  Phos  2.3     12-11  Mg     2.7     12-11    TPro  4.5<L>  /  Alb  1.9<L>  /  TBili  0.6  /  DBili  0.3<H>  /  AST  37  /  ALT  27  /  AlkPhos  41  12-11    proBNP:   Lipid Profile:   HgA1c:   TSH:             TELEMETRY: SR	    ECG:  	  RADIOLOGY:  < from: Xray Chest 1 View- PORTABLE-Urgent (12.11.19 @ 06:54) >    EXAM:  XR CHEST PORTABLE URGENT 1V                            PROCEDURE DATE:  12/11/2019            INTERPRETATION:  A single chest x-ray was obtained on December 11, 2019.    Indication: Intubated.    Impression:     Suboptimal study. Both costophrenic angles are not included in this   study. The heart is normal in size. Mild pulmonary vascular congestion.   Endotracheal tube is in good position. NG tube is in the stomach however   its tip is not seen on the current study. A central line seenon the   right and the tip is either in the brachiocephalic veins or superior vena   cava.                    TRUDI DIETZ M.D., ATTENDING RADIOLOGIST  This document has been electronically signed. Dec 11 2019 12:19PM                < end of copied text >    DIAGNOSTIC TESTING:  [ ] Echocardiogram:  [ ]  Catheterization:  [ ] Stress Test:    OTHER:

## 2019-12-11 NOTE — PROGRESS NOTE ADULT - SUBJECTIVE AND OBJECTIVE BOX
GENERAL SURGERY DAILY PROGRESS NOTE:    Interval:  24 HOUR EVENTS:  - Went back to the OR with Dr. GISELE Guerra on 12/10: Resected about 150cm of small bowel, 150cm of small bowel remaining, all pink/healthy/peristalsing. Patchy areas of ischemia on cecum, so proximal ascending colon resected. +End ileostomy, JOVANNA drain placed - travels across lower quadrants and ends in right upper quadrant. EBL was 50ml, given 1L crystalloid intraop, 150 cc urine output.   - Brought back from the OR on same amount of vaso(.03) and levo (.16) as prior to surgery.   - A line from right radial artery removed, A line place in left radial artery.   - Right IJ Triple lumen accidentally pulled out partially in OR, CXR obtained and is out 2 cm, but still functional. Not replaced.     Subjective:  Patient seen and examined this am.   Can follow commands, squeezes hand when asked  Awake and following with eyes.   Intubated.    OBJECTIVE:  Exam:  Gen: NAD, resting in bed  Resp: Intubated  Abd: NGT. Soft, ND. Markedly decreased tenderness. VAC in place. No erythema/induration.  : Martinez   Ext: No edema, WWP wrist restraints ICDs    T(C): 37.1 (12-11-19 @ 07:00), Max: 37.2 (12-11-19 @ 03:00)  HR: 67 (12-11-19 @ 08:40) (53 - 115)  BP: 91/40 (12-11-19 @ 06:45) (80/51 - 133/63)  RR: 27 (12-11-19 @ 08:15) (18 - 31)  SpO2: 97% (12-11-19 @ 08:40) (93% - 100%)  Wt(kg): --    LABS:                        11.6   14.63 )-----------( 152      ( 11 Dec 2019 00:55 )             35.3     12-11    142  |  105  |  29<H>  ----------------------------<  92  3.8   |  19<L>  |  0.76    Ca    7.2<L>      11 Dec 2019 00:55  Phos  3.4     12-11  Mg     3.0     12-11    TPro  4.9<L>  /  Alb  2.2<L>  /  TBili  0.8  /  DBili  0.4<H>  /  AST  39  /  ALT  30  /  AlkPhos  44  12-11    PT/INR - ( 11 Dec 2019 00:55 )   PT: 10.4 sec;   INR: 0.91 ratio         PTT - ( 11 Dec 2019 00:55 )  PTT:22.5 sec      Is&O's  12-10-19 @ 07:01  -  12-11-19 @ 07:00  --------------------------------------------------------  IN: 3470.3 mL / OUT: 1825 mL / NET: 1645.3 mL    12-11-19 @ 07:01  -  12-11-19 @ 08:44  --------------------------------------------------------  IN: 175.1 mL / OUT: 100 mL / NET: 75.1 mL GENERAL SURGERY DAILY PROGRESS NOTE:    Interval:  24 HOUR EVENTS:  - Went back to the OR with Dr. GISELE Guerra on 12/10: Resected about 150cm of small bowel, 150cm of small bowel remaining, all pink/healthy/peristalsing. Patchy areas of ischemia on cecum, so proximal ascending colon resected. +End ileostomy + mucus fistula, JOVANNA drain placed - travels across lower quadrants and ends in right upper quadrant. EBL was 50ml, given 1L crystalloid intraop, 150 cc urine output.   - Brought back from the OR on same amount of vaso(.03) and levo (.16) as prior to surgery.   - A line from right radial artery removed, A line place in left radial artery.   - Right IJ Triple lumen accidentally pulled out partially in OR, CXR obtained and is out 2 cm, but still functional. Not replaced.     Subjective:  Patient seen and examined this am.   Can follow commands, squeezes hand when asked  Awake and following with eyes.   Intubated.    OBJECTIVE:  Exam:  Gen: NAD, resting in bed  Resp: Intubated  Abd: NGT. Soft, ND. Markedly decreased tenderness. VAC in place. No erythema/induration.  : Martinez   Ext: No edema, WWP wrist restraints ICDs    T(C): 37.1 (12-11-19 @ 07:00), Max: 37.2 (12-11-19 @ 03:00)  HR: 67 (12-11-19 @ 08:40) (53 - 115)  BP: 91/40 (12-11-19 @ 06:45) (80/51 - 133/63)  RR: 27 (12-11-19 @ 08:15) (18 - 31)  SpO2: 97% (12-11-19 @ 08:40) (93% - 100%)  Wt(kg): --    LABS:                        11.6   14.63 )-----------( 152      ( 11 Dec 2019 00:55 )             35.3     12-11    142  |  105  |  29<H>  ----------------------------<  92  3.8   |  19<L>  |  0.76    Ca    7.2<L>      11 Dec 2019 00:55  Phos  3.4     12-11  Mg     3.0     12-11    TPro  4.9<L>  /  Alb  2.2<L>  /  TBili  0.8  /  DBili  0.4<H>  /  AST  39  /  ALT  30  /  AlkPhos  44  12-11    PT/INR - ( 11 Dec 2019 00:55 )   PT: 10.4 sec;   INR: 0.91 ratio         PTT - ( 11 Dec 2019 00:55 )  PTT:22.5 sec      Is&O's  12-10-19 @ 07:01  -  12-11-19 @ 07:00  --------------------------------------------------------  IN: 3470.3 mL / OUT: 1825 mL / NET: 1645.3 mL    12-11-19 @ 07:01  -  12-11-19 @ 08:44  --------------------------------------------------------  IN: 175.1 mL / OUT: 100 mL / NET: 75.1 mL

## 2019-12-11 NOTE — PROGRESS NOTE ADULT - SUBJECTIVE AND OBJECTIVE BOX
HISTORY  74 year old male with a past medical history of COPD and EtOH dependence who presented today with abdominal pain, nausea, hematemesis, and poor PO intake for ~2-3 days. Labs significant for an CHI w/ Cr 2.74 and lactate of 9.4. He was also notably tachycardic to the 110s and hypotensive w/ SBP in the 70s. He was given 2 units of PRBCs and 2 L of LR in the ED due to concern for upper GI bleeding. Imaging revealed high grade SBO in the RLQ. Patient was taken to the OR emergently for an exploratory laparotomy, lysis of adhesions, decompression of bowel via enterotomy w/ primary repair, and Abthera VAC placement. Of note, the distal 50% of the bowel appeared dusky but viable. He required vasopressor support with phenylephrine and vasopressin infusions. He received 3000 mL of crystalloid w/ EBL of 10 mL and UOP of 25 mL. Patient was left intubated at the end of the case so SICU was consulted for hemodynamic monitoring. Taken to the OR on 12/6 and underwent Ex-lap with MIRYAM, enterotomy for bowel decompression and Abthera VAC placement. Taken back to the OR on 12/8 and underwent take down of Abthera, washout and re-application of the Abthera vac. Went back to OR on 12/10 night for colectomy.     24 HOUR EVENTS:  - Went back to the OR with Dr. GISELE Guerra on 12/10: Resected about 150cm of small bowel, 150cm of small bowel remaining, all pink/healthy/peristalsing. Patchy areas of ischemia on cecum, so proximal ascending colon resected. +End ileostomy, JOVANNA drain placed - travels across lower quadrants and ends in right upper quadrant. EBL was 50ml, given 1L crystalloid intraop.     SUBJECTIVE/ROS: Unable to obtain, patient intubated and sedated.   [x] A ten-point review of systems was otherwise negative except as noted.  [ ] Due to altered mental status/intubation, subjective information were not able to be obtained from the patient. History was obtained, to the extent possible, from review of the chart and collateral sources of information.      NEURO  Exam: Patient intubated, sedated and paralyzed   Meds: dexMEDEtomidine Infusion 0.2 MICROgram(s)/kG/Hr IV Continuous <Continuous>  fentaNYL    Injectable 25 MICROGram(s) IV Push every 2 hours PRN Moderate Pain (4 - 6)  fentaNYL    Injectable 50 MICROGram(s) IV Push every 2 hours PRN Severe Pain (7 - 10)    [x] Adequacy of sedation and pain control has been assessed and adjusted      RESPIRATORY  RR: 22 (12-11-19 @ 02:30) (17 - 31)  SpO2: 97% (12-11-19 @ 02:30) (91% - 100%)  Wt(kg): --  Exam: unlabored, clear to auscultation bilaterally  Mechanical Ventilation: Mode: AC/ CMV (Assist Control/ Continuous Mandatory Ventilation), RR (machine): 22, RR (patient): 24, TV (machine): 450, FiO2: 30, PEEP: 5, ITime: 0.9, MAP: 14, PIP: 30  ABG - ( 11 Dec 2019 00:52 )  pH: 7.34  /  pCO2: 39    /  pO2: 82    / HCO3: 21    / Base Excess: -4.3  /  SaO2: 95      Lactate: x                [ ] Extubation Readiness Assessed  Meds:       CARDIOVASCULAR  HR: 75 (12-11-19 @ 02:30) (53 - 99)  BP: 115/47 (12-11-19 @ 02:30) (77/51 - 133/63)  BP(mean): 68 (12-11-19 @ 02:30) (60 - 104)  ABP: 49/43 (12-11-19 @ 02:30) (48/42 - 329/324)  ABP(mean): 45 (12-11-19 @ 02:30) (44 - 325)  Wt(kg): --  CVP(cm H2O): --      Exam:  Cardiac Rhythm:  Perfusion     [ ]Adequate   [ ]Inadequate  Mentation   [ ]Normal       [ ]Reduced  Extremities  [ ]Warm         [ ]Cool  Volume Status [ ]Hypervolemic [ ]Euvolemic [ ]Hypovolemic  Meds: norepinephrine Infusion 0.15 MICROgram(s)/kG/Min IV Continuous <Continuous>        GI/NUTRITION  Exam:  Diet:  Meds: pantoprazole  Injectable 40 milliGRAM(s) IV Push daily      GENITOURINARY  I&O's Detail    12-09 @ 07:01  -  12-10 @ 07:00  --------------------------------------------------------  IN:    dexmedetomidine Infusion: 266.6 mL    Enteral Tube Flush: 150 mL    fentaNYL Infusion.: 64.8 mL    IV PiggyBack: 275 mL    lactated ringers.: 300 mL    multiple electrolytes Injection Type 1multiple electrolytes Injection Type 1: 2625 mL    Solution: 250 mL    vasopressin Infusion: 43.2 mL  Total IN: 3974.6 mL    OUT:    Indwelling Catheter - Urethral: 620 mL    Nasoenteral Tube: 200 mL    VAC (Vacuum Assisted Closure) System: 700 mL  Total OUT: 1520 mL    Total NET: 2454.6 mL      12-10 @ 07:01 - 12-11 @ 02:51  --------------------------------------------------------  IN:    dexmedetomidine Infusion: 16.3 mL    dexmedetomidine Infusion: 89.7 mL    Enteral Tube Flush: 100 mL    fentaNYL Infusion.: 32.4 mL    multiple electrolytes Injection Type 1: 375 mL    multiple electrolytes Injection Type 1multiple electrolytes Injection Type 1: 1625 mL    norepinephrine Infusion: 52.7 mL    Solution: 200 mL    vasopressin Infusion: 5.4 mL    vasopressin Infusion: 5.4 mL    vasopressin Infusion: 5.4 mL  Total IN: 2507.3 mL    OUT:    Indwelling Catheter - Urethral: 775 mL    Nasoenteral Tube: 75 mL    VAC (Vacuum Assisted Closure) System: 450 mL  Total OUT: 1300 mL    Total NET: 1207.3 mL          12-11    142  |  105  |  29<H>  ----------------------------<  92  3.8   |  19<L>  |  0.76    Ca    7.2<L>      11 Dec 2019 00:55  Phos  3.4     12-11  Mg     3.0     12-11    TPro  4.9<L>  /  Alb  2.2<L>  /  TBili  0.8  /  DBili  0.4<H>  /  AST  39  /  ALT  30  /  AlkPhos  44  12-11    [ ] Martinez catheter, indication: N/A  Meds: multiple electrolytes Injection Type 1 1000 milliLiter(s) IV Continuous <Continuous>  potassium chloride  20 mEq/100 mL IVPB 20 milliEquivalent(s) IV Intermittent once        HEMATOLOGIC  Meds: enoxaparin Injectable 40 milliGRAM(s) SubCutaneous daily    [x] VTE Prophylaxis                        11.6   14.63 )-----------( 152      ( 11 Dec 2019 00:55 )             35.3     PT/INR - ( 11 Dec 2019 00:55 )   PT: 10.4 sec;   INR: 0.91 ratio         PTT - ( 11 Dec 2019 00:55 )  PTT:22.5 sec  Transfusion     [ ] PRBC   [ ] Platelets   [ ] FFP   [ ] Cryoprecipitate      INFECTIOUS DISEASES  T(C): 36.3 (12-11-19 @ 00:00), Max: 36.9 (12-10-19 @ 07:00)  Wt(kg): --  WBC Count: 14.63 K/uL (12-11 @ 00:55)  WBC Count: 7.46 K/uL (12-10 @ 16:04)  WBC Count: 8.34 K/uL (12-10 @ 03:33)    Recent Cultures:  Specimen Source: .Blood Blood, 12-06 @ 22:20; Results   No growth to date.; Gram Stain: --; Organism: --    Meds: piperacillin/tazobactam IVPB.. 3.375 Gram(s) IV Intermittent every 8 hours        ENDOCRINE  Capillary Blood Glucose    Meds: vasopressin Infusion 0.03 Unit(s)/Min IV Continuous <Continuous>        ACCESS DEVICES:  [ ] Peripheral IV  [ ] Central Venous Line	[ ] R	[ ] L	[ ] IJ	[ ] Fem	[ ] SC	Placed:   [ ] Arterial Line		[ ] R	[ ] L	[ ] Fem	[ ] Rad	[ ] Ax	Placed:   [ ] PICC:					[ ] Mediport  [ ] Urinary Catheter, Date Placed:   [ ] Necessity of urinary, arterial, and venous catheters discussed    OTHER MEDICATIONS:  chlorhexidine 0.12% Liquid 15 milliLiter(s) Oral Mucosa every 12 hours  chlorhexidine 2% Cloths 1 Application(s) Topical daily      CODE STATUS:     IMAGING: HISTORY  74 year old male with a past medical history of COPD and EtOH dependence who presented today with abdominal pain, nausea, hematemesis, and poor PO intake for ~2-3 days. Labs significant for an CHI w/ Cr 2.74 and lactate of 9.4. He was also notably tachycardic to the 110s and hypotensive w/ SBP in the 70s. He was given 2 units of PRBCs and 2 L of LR in the ED due to concern for upper GI bleeding. Imaging revealed high grade SBO in the RLQ. Patient was taken to the OR emergently for an exploratory laparotomy, lysis of adhesions, decompression of bowel via enterotomy w/ primary repair, and Abthera VAC placement. Of note, the distal 50% of the bowel appeared dusky but viable. He required vasopressor support with phenylephrine and vasopressin infusions. He received 3000 mL of crystalloid w/ EBL of 10 mL and UOP of 25 mL. Patient was left intubated at the end of the case so SICU was consulted for hemodynamic monitoring. Taken to the OR on 12/6 and underwent Ex-lap with MIRYAM, enterotomy for bowel decompression and Abthera VAC placement. Taken back to the OR on 12/8 and underwent take down of Abthera, washout and re-application of the Abthera vac. Went back to OR on 12/10 night for colectomy.     24 HOUR EVENTS:  - Went back to the OR with Dr. GISELE Guerra on 12/10: Resected about 150cm of small bowel, 150cm of small bowel remaining, all pink/healthy/peristalsing. Patchy areas of ischemia on cecum, so proximal ascending colon resected. +End ileostomy, JOVANNA drain placed - travels across lower quadrants and ends in right upper quadrant. EBL was 50ml, given 1L crystalloid intraop, 150 cc urine output.   - Brought back from the OR on same amount of vaso(.03) and levo (.16) as prior to surgery.   - A line from right radial artery removed, A line place in left radial artery.   - Right IJ Triple lumen accidentally pulled out partially in OR, CXR obtained and is out 2 cm, but still functional. Not replaced.     SUBJECTIVE/ROS: Unable to obtain, patient intubated and sedated.   [x] A ten-point review of systems was otherwise negative except as noted.  [ ] Due to altered mental status/intubation, subjective information were not able to be obtained from the patient. History was obtained, to the extent possible, from review of the chart and collateral sources of information.      NEURO  Exam: Patient intubated, sedated and paralyzed   Meds: dexMEDEtomidine Infusion 0.2 MICROgram(s)/kG/Hr IV Continuous <Continuous>  fentaNYL    Injectable 25 MICROGram(s) IV Push every 2 hours PRN Moderate Pain (4 - 6)  fentaNYL    Injectable 50 MICROGram(s) IV Push every 2 hours PRN Severe Pain (7 - 10)    [x] Adequacy of sedation and pain control has been assessed and adjusted      RESPIRATORY  RR: 22 (12-11-19 @ 02:30) (17 - 31)  SpO2: 97% (12-11-19 @ 02:30) (91% - 100%)  Wt(kg): --  Exam: unlabored, clear to auscultation bilaterally  Mechanical Ventilation: Mode: AC/ CMV (Assist Control/ Continuous Mandatory Ventilation), RR (machine): 22, RR (patient): 24, TV (machine): 450, FiO2: 30, PEEP: 5, ITime: 0.9, MAP: 14, PIP: 30  ABG - ( 11 Dec 2019 00:52 )  pH: 7.34  /  pCO2: 39    /  pO2: 82    / HCO3: 21    / Base Excess: -4.3  /  SaO2: 95      Lactate: x                [ ] Extubation Readiness Assessed  Meds:       CARDIOVASCULAR  HR: 75 (12-11-19 @ 02:30) (53 - 99)  BP: 115/47 (12-11-19 @ 02:30) (77/51 - 133/63)  BP(mean): 68 (12-11-19 @ 02:30) (60 - 104)  ABP: 49/43 (12-11-19 @ 02:30) (48/42 - 329/324)  ABP(mean): 45 (12-11-19 @ 02:30) (44 - 325)  Wt(kg): --  CVP(cm H2O): --      Exam:  Cardiac Rhythm:  Perfusion     [ ]Adequate   [ ]Inadequate  Mentation   [ ]Normal       [ ]Reduced  Extremities  [ ]Warm         [ ]Cool  Volume Status [ ]Hypervolemic [ ]Euvolemic [ ]Hypovolemic  Meds: norepinephrine Infusion 0.15 MICROgram(s)/kG/Min IV Continuous <Continuous>        GI/NUTRITION  Exam:  Diet:  Meds: pantoprazole  Injectable 40 milliGRAM(s) IV Push daily      GENITOURINARY  I&O's Detail    12-09 @ 07:01  -  12-10 @ 07:00  --------------------------------------------------------  IN:    dexmedetomidine Infusion: 266.6 mL    Enteral Tube Flush: 150 mL    fentaNYL Infusion.: 64.8 mL    IV PiggyBack: 275 mL    lactated ringers.: 300 mL    multiple electrolytes Injection Type 1multiple electrolytes Injection Type 1: 2625 mL    Solution: 250 mL    vasopressin Infusion: 43.2 mL  Total IN: 3974.6 mL    OUT:    Indwelling Catheter - Urethral: 620 mL    Nasoenteral Tube: 200 mL    VAC (Vacuum Assisted Closure) System: 700 mL  Total OUT: 1520 mL    Total NET: 2454.6 mL      12-10 @ 07:01 - 12-11 @ 02:51  --------------------------------------------------------  IN:    dexmedetomidine Infusion: 16.3 mL    dexmedetomidine Infusion: 89.7 mL    Enteral Tube Flush: 100 mL    fentaNYL Infusion.: 32.4 mL    multiple electrolytes Injection Type 1: 375 mL    multiple electrolytes Injection Type 1multiple electrolytes Injection Type 1: 1625 mL    norepinephrine Infusion: 52.7 mL    Solution: 200 mL    vasopressin Infusion: 5.4 mL    vasopressin Infusion: 5.4 mL    vasopressin Infusion: 5.4 mL  Total IN: 2507.3 mL    OUT:    Indwelling Catheter - Urethral: 775 mL    Nasoenteral Tube: 75 mL    VAC (Vacuum Assisted Closure) System: 450 mL  Total OUT: 1300 mL    Total NET: 1207.3 mL          12-11    142  |  105  |  29<H>  ----------------------------<  92  3.8   |  19<L>  |  0.76    Ca    7.2<L>      11 Dec 2019 00:55  Phos  3.4     12-11  Mg     3.0     12-11    TPro  4.9<L>  /  Alb  2.2<L>  /  TBili  0.8  /  DBili  0.4<H>  /  AST  39  /  ALT  30  /  AlkPhos  44  12-11    [ ] Martinez catheter, indication: N/A  Meds: multiple electrolytes Injection Type 1 1000 milliLiter(s) IV Continuous <Continuous>  potassium chloride  20 mEq/100 mL IVPB 20 milliEquivalent(s) IV Intermittent once        HEMATOLOGIC  Meds: enoxaparin Injectable 40 milliGRAM(s) SubCutaneous daily    [x] VTE Prophylaxis                        11.6   14.63 )-----------( 152      ( 11 Dec 2019 00:55 )             35.3     PT/INR - ( 11 Dec 2019 00:55 )   PT: 10.4 sec;   INR: 0.91 ratio         PTT - ( 11 Dec 2019 00:55 )  PTT:22.5 sec  Transfusion     [ ] PRBC   [ ] Platelets   [ ] FFP   [ ] Cryoprecipitate      INFECTIOUS DISEASES  T(C): 36.3 (12-11-19 @ 00:00), Max: 36.9 (12-10-19 @ 07:00)  Wt(kg): --  WBC Count: 14.63 K/uL (12-11 @ 00:55)  WBC Count: 7.46 K/uL (12-10 @ 16:04)  WBC Count: 8.34 K/uL (12-10 @ 03:33)    Recent Cultures:  Specimen Source: .Blood Blood, 12-06 @ 22:20; Results   No growth to date.; Gram Stain: --; Organism: --    Meds: piperacillin/tazobactam IVPB.. 3.375 Gram(s) IV Intermittent every 8 hours        ENDOCRINE  Capillary Blood Glucose    Meds: vasopressin Infusion 0.03 Unit(s)/Min IV Continuous <Continuous>        ACCESS DEVICES:  [ ] Peripheral IV  [x] Central Venous Line	[x] R	[ ] L	[x] IJ	[ ] Fem	[ ] SC	Placed:   [x] Arterial Line		[ ] R	[x] L	[ ] Fem	[x] Rad	[ ] Ax	Placed: 12/11/19    [ ] PICC:					[ ] Mediport  [ ] Urinary Catheter, Date Placed:   [ ] Necessity of urinary, arterial, and venous catheters discussed    OTHER MEDICATIONS:  chlorhexidine 0.12% Liquid 15 milliLiter(s) Oral Mucosa every 12 hours  chlorhexidine 2% Cloths 1 Application(s) Topical daily      CODE STATUS:     IMAGING: HISTORY  74 year old male with a past medical history of COPD and EtOH dependence who presented today with abdominal pain, nausea, hematemesis, and poor PO intake for ~2-3 days. Labs significant for an CHI w/ Cr 2.74 and lactate of 9.4. He was also notably tachycardic to the 110s and hypotensive w/ SBP in the 70s. He was given 2 units of PRBCs and 2 L of LR in the ED due to concern for upper GI bleeding. Imaging revealed high grade SBO in the RLQ. Patient was taken to the OR emergently for an exploratory laparotomy, lysis of adhesions, decompression of bowel via enterotomy w/ primary repair, and Abthera VAC placement. Of note, the distal 50% of the bowel appeared dusky but viable. He required vasopressor support with phenylephrine and vasopressin infusions. He received 3000 mL of crystalloid w/ EBL of 10 mL and UOP of 25 mL. Patient was left intubated at the end of the case so SICU was consulted for hemodynamic monitoring. Taken to the OR on 12/6 and underwent Ex-lap with MIRYAM, enterotomy for bowel decompression and Abthera VAC placement. Taken back to the OR on 12/8 and underwent take down of Abthera, washout and re-application of the Abthera vac. Went back to OR on 12/10 night for colectomy.     24 HOUR EVENTS:  - Went back to the OR with Dr. GISELE Guerra on 12/10: Resected about 150cm of small bowel, 150cm of small bowel remaining, all pink/healthy/peristalsing. Patchy areas of ischemia on cecum, so proximal ascending colon resected. +End ileostomy, JOVANNA drain placed - travels across lower quadrants and ends in right upper quadrant. EBL was 50ml, given 1L crystalloid intraop, 150 cc urine output.   - Brought back from the OR on same amount of vaso(.03) and levo (.16) as prior to surgery.   - A line from right radial artery removed, A line place in left radial artery.   - Right IJ Triple lumen accidentally pulled out partially in OR, CXR obtained and is out 2 cm, but still functional. Not replaced.     SUBJECTIVE/ROS: Unable to obtain, patient intubated and sedated.   [x] A ten-point review of systems was otherwise negative except as noted.  [ ] Due to altered mental status/intubation, subjective information were not able to be obtained from the patient. History was obtained, to the extent possible, from review of the chart and collateral sources of information.      NEURO  Exam: Patient intubated, sedated and paralyzed   Meds: dexMEDEtomidine Infusion 0.2 MICROgram(s)/kG/Hr IV Continuous <Continuous>  fentaNYL    Injectable 25 MICROGram(s) IV Push every 2 hours PRN Moderate Pain (4 - 6)  fentaNYL    Injectable 50 MICROGram(s) IV Push every 2 hours PRN Severe Pain (7 - 10)    [x] Adequacy of sedation and pain control has been assessed and adjusted      RESPIRATORY  RR: 22 (12-11-19 @ 02:30) (17 - 31)  SpO2: 97% (12-11-19 @ 02:30) (91% - 100%)  Wt(kg): --  Exam: unlabored, clear to auscultation bilaterally  Mechanical Ventilation: Mode: AC/ CMV (Assist Control/ Continuous Mandatory Ventilation), RR (machine): 22, RR (patient): 24, TV (machine): 450, FiO2: 30, PEEP: 5, ITime: 0.9, MAP: 14, PIP: 30  ABG - ( 11 Dec 2019 00:52 )  pH: 7.34  /  pCO2: 39    /  pO2: 82    / HCO3: 21    / Base Excess: -4.3  /  SaO2: 95      Lactate: x                [ ] Extubation Readiness Assessed  Meds:       CARDIOVASCULAR  HR: 75 (12-11-19 @ 02:30) (53 - 99)  BP: 115/47 (12-11-19 @ 02:30) (77/51 - 133/63)  BP(mean): 68 (12-11-19 @ 02:30) (60 - 104)  ABP: 49/43 (12-11-19 @ 02:30) (48/42 - 329/324)  ABP(mean): 45 (12-11-19 @ 02:30) (44 - 325)  Wt(kg): --  CVP(cm H2O): --      Exam:  Cardiac Rhythm:  Perfusion     [ ]Adequate   [ ]Inadequate  Mentation   [ ]Normal       [ ]Reduced  Extremities  [ ]Warm         [ ]Cool  Volume Status [ ]Hypervolemic [ ]Euvolemic [ ]Hypovolemic  Meds: norepinephrine Infusion 0.15 MICROgram(s)/kG/Min IV Continuous <Continuous>        GI/NUTRITION  Exam:  Diet:  Meds: pantoprazole  Injectable 40 milliGRAM(s) IV Push daily      GENITOURINARY  I&O's Detail    12-09 @ 07:01  -  12-10 @ 07:00  --------------------------------------------------------  IN:    dexmedetomidine Infusion: 266.6 mL    Enteral Tube Flush: 150 mL    fentaNYL Infusion.: 64.8 mL    IV PiggyBack: 275 mL    lactated ringers.: 300 mL    multiple electrolytes Injection Type 1multiple electrolytes Injection Type 1: 2625 mL    Solution: 250 mL    vasopressin Infusion: 43.2 mL  Total IN: 3974.6 mL    OUT:    Indwelling Catheter - Urethral: 620 mL    Nasoenteral Tube: 200 mL    VAC (Vacuum Assisted Closure) System: 700 mL  Total OUT: 1520 mL    Total NET: 2454.6 mL      12-10 @ 07:01 - 12-11 @ 02:51  --------------------------------------------------------  IN:    dexmedetomidine Infusion: 16.3 mL    dexmedetomidine Infusion: 89.7 mL    Enteral Tube Flush: 100 mL    fentaNYL Infusion.: 32.4 mL    multiple electrolytes Injection Type 1: 375 mL    multiple electrolytes Injection Type 1multiple electrolytes Injection Type 1: 1625 mL    norepinephrine Infusion: 52.7 mL    Solution: 200 mL    vasopressin Infusion: 5.4 mL    vasopressin Infusion: 5.4 mL    vasopressin Infusion: 5.4 mL  Total IN: 2507.3 mL    OUT:    Indwelling Catheter - Urethral: 775 mL    Nasoenteral Tube: 75 mL    VAC (Vacuum Assisted Closure) System: 450 mL  Total OUT: 1300 mL    Total NET: 1207.3 mL          12-11    142  |  105  |  29<H>  ----------------------------<  92  3.8   |  19<L>  |  0.76    Ca    7.2<L>      11 Dec 2019 00:55  Phos  3.4     12-11  Mg     3.0     12-11    TPro  4.9<L>  /  Alb  2.2<L>  /  TBili  0.8  /  DBili  0.4<H>  /  AST  39  /  ALT  30  /  AlkPhos  44  12-11    [ ] Martinez catheter, indication: N/A  Meds: multiple electrolytes Injection Type 1 1000 milliLiter(s) IV Continuous <Continuous>  potassium chloride  20 mEq/100 mL IVPB 20 milliEquivalent(s) IV Intermittent once        HEMATOLOGIC  Meds: enoxaparin Injectable 40 milliGRAM(s) SubCutaneous daily    [x] VTE Prophylaxis                        11.6   14.63 )-----------( 152      ( 11 Dec 2019 00:55 )             35.3     PT/INR - ( 11 Dec 2019 00:55 )   PT: 10.4 sec;   INR: 0.91 ratio         PTT - ( 11 Dec 2019 00:55 )  PTT:22.5 sec  Transfusion     [ ] PRBC   [ ] Platelets   [ ] FFP   [ ] Cryoprecipitate      INFECTIOUS DISEASES  T(C): 36.3 (12-11-19 @ 00:00), Max: 36.9 (12-10-19 @ 07:00)  Wt(kg): --  WBC Count: 14.63 K/uL (12-11 @ 00:55)  WBC Count: 7.46 K/uL (12-10 @ 16:04)  WBC Count: 8.34 K/uL (12-10 @ 03:33)    Recent Cultures:  Specimen Source: .Blood Blood, 12-06 @ 22:20; Results   No growth to date.; Gram Stain: --; Organism: --    Meds: piperacillin/tazobactam IVPB.. 3.375 Gram(s) IV Intermittent every 8 hours        ENDOCRINE  Capillary Blood Glucose    Meds: vasopressin Infusion 0.03 Unit(s)/Min IV Continuous <Continuous>        ACCESS DEVICES:  [ ] Peripheral IV  [x] Central Venous Line	[x] R	[ ] L	[x] IJ	[ ] Fem	[ ] SC	Placed:   [x] Arterial Line		[ ] R	[x] L	[ ] Fem	[x] Rad	[ ] Ax	Placed: 12/11/19    [ ] PICC:					[ ] Mediport  [ ] Urinary Catheter, Date Placed:   [ ] Necessity of urinary, arterial, and venous catheters discussed    OTHER MEDICATIONS:  chlorhexidine 0.12% Liquid 15 milliLiter(s) Oral Mucosa every 12 hours  chlorhexidine 2% Cloths 1 Application(s) Topical daily

## 2019-12-11 NOTE — PROGRESS NOTE ADULT - ASSESSMENT
75 y/o M presenting with septic shock and high grade SBO s/p exploratory laparotomy, lysis of adhesions, decompression of bowel via enterotomy w/ primary repair, and Abthera VAC placement on 12/6; s/p take down of Abthera, washout and re-application of the Abthera vac on 12/8. Now s/p colectomy/ileostomy on 12/10.     PLAN:    Neuro: patient intubated/sedated but alert to stimuli/acute pain  - Precedex for sedation while intubated  - Dilaudid pushes for analgesia     Resp: acute respiratory failure upon initial presentation  - Brought back from the OR still intubated, continue mechanical ventilatory support  - On minimal vent setting, SBT in the morning to assess for extubation  - Trend ABG and lactate q4 hours   - Daily CXR while intubated  - Vania    CV: septic shock requiring vasopressor support  - Wean levophed first and then vasopressin as tolerated while maintaining MAP >65  - New a line placed in left radial artery   - Naeem Trac, CVP, trend lactate - 1.3 most recently    GI: SBO s/p exploratory laparotomy, lysis of adhesions, decompression of bowel via enterotomy w/ primary repair, and Abthera VAC placement  - NPO/ NGT  - TPN consultation  - Protonix for stress ulcer prophylaxis  - PPI BID  - Plan for RTOR today, 12/10    Renal: CHI   - Plasmalyte @125  - Martinez, strict I&Os    Heme: no acute issues  - Lovenox for VTE prophylaxis  - Venodynes    ID: septic shock 2/2 intra abdominal infection  - Continue Zosyn    Endo: no acute issues  - Monitor glucose on BMP 73 y/o M presenting with septic shock and high grade SBO s/p exploratory laparotomy, lysis of adhesions, decompression of bowel via enterotomy w/ primary repair, and Abthera VAC placement on 12/6; s/p take down of Abthera, washout and re-application of the Abthera vac on 12/8. Now s/p colectomy/ileostomy on 12/10.     PLAN:    Neuro: patient intubated/sedated but alert to stimuli/acute pain  - Precedex for sedation while intubated  - Dilaudid pushes for analgesia     Resp: acute respiratory failure upon initial presentation  - Brought back from the OR still intubated, continue mechanical ventilatory support  - On minimal vent setting, SBT in the morning to assess for extubation  - Trend ABG and lactate q4 hours   - Daily CXR while intubated  - Vania    CV: septic shock requiring vasopressor support  - Wean levophed first and then vasopressin as tolerated while maintaining MAP >65  - New a line placed in left radial artery   - Naeem Trac, CVP, trend lactate - 1.3 most recently    GI: SBO s/p exploratory laparotomy, lysis of adhesions, decompression of bowel via enterotomy w/ primary repair, and Abthera VAC placement. \  - Patient now s/p OR for colectomy and ileostomy. Abdomen closed.   - NPO/ NGT to suction   - Protonix for stress ulcer prophylaxis      Renal: CHI   - Plasmalyte @125  - Martinez, strict I&Os    Heme: no acute issues  - Lovenox for VTE prophylaxis  - Venodynes    ID: septic shock 2/2 intra abdominal infection  - Continue Zosyn    Endo: no acute issues  - Monitor glucose on BMP 75 y/o M presenting with septic shock and high grade SBO s/p exploratory laparotomy, lysis of adhesions, decompression of bowel via enterotomy w/ primary repair, and Abthera VAC placement on 12/6; s/p take down of Abthera, washout and re-application of the Abthera vac on 12/8. Now s/p SBR and end ileostomy on 12/10.     PLAN:    Neuro: patient intubated/sedated but alert to stimuli/acute pain  - Precedex for sedation while intubated  - Dilaudid pushes for analgesia     Resp: acute respiratory failure upon initial presentation  - Brought back from the OR still intubated, continue mechanical ventilatory support  - On minimal vent setting, SBT in the morning to assess for extubation  - Trend ABG and lactate q4 hours   - Daily CXR while intubated  - Vania    CV: septic shock requiring vasopressor support  - Wean levophed first and then vasopressin as tolerated while maintaining MAP >65  - New a line placed in left radial artery   - Naeem Trac, CVP, trend lactate - 1.3 most recently    GI: SBO s/p exploratory laparotomy, lysis of adhesions, decompression of bowel via enterotomy w/ primary repair, and Abthera VAC placement. \  - Patient now s/p OR  for SBR with end ileostomy on 12/10. Abdomen closed.   - NPO/ NGT to suction   - Protonix for stress ulcer prophylaxis      Renal: CHI   - Plasmalyte @125  - Martinez, strict I&Os    Heme: no acute issues  - Lovenox for VTE prophylaxis  - Venodynes    ID: septic shock 2/2 intra abdominal infection  - Continue Zosyn    Endo: no acute issues  - Monitor glucose on BMP

## 2019-12-11 NOTE — CHART NOTE - NSCHARTNOTEFT_GEN_A_CORE
POST-OPERATIVE NOTE    Subjective:  Patient is s/p Ex lap abdominal washout SBR and creation of end ileostomy . Intubated and sedated in SICU. IS responsive when asked questions with nodding. Denies pain n/v. Remains on pressor support. Ileostomy with minimal liquid output. Incisions c/d/i. NGT in place. Martinez with urine    Vital Signs Last 24 Hrs  T(C): 36.3 (11 Dec 2019 00:00), Max: 36.9 (10 Dec 2019 07:00)  T(F): 97.3 (11 Dec 2019 00:00), Max: 98.4 (10 Dec 2019 07:00)  HR: 92 (11 Dec 2019 04:17) (53 - 99)  BP: 115/47 (11 Dec 2019 02:30) (80/51 - 133/63)  BP(mean): 68 (11 Dec 2019 02:30) (60 - 104)  RR: 22 (11 Dec 2019 02:30) (17 - 31)  SpO2: 93% (11 Dec 2019 04:17) (93% - 100%)  I&O's Detail    09 Dec 2019 07:01  -  10 Dec 2019 07:00  --------------------------------------------------------  IN:    dexmedetomidine Infusion: 266.6 mL    Enteral Tube Flush: 150 mL    fentaNYL Infusion.: 64.8 mL    IV PiggyBack: 275 mL    lactated ringers.: 300 mL    multiple electrolytes Injection Type 1multiple electrolytes Injection Type 1: 2625 mL    Solution: 250 mL    vasopressin Infusion: 43.2 mL  Total IN: 3974.6 mL    OUT:    Indwelling Catheter - Urethral: 620 mL    Nasoenteral Tube: 200 mL    VAC (Vacuum Assisted Closure) System: 700 mL  Total OUT: 1520 mL    Total NET: 2454.6 mL      10 Dec 2019 07:01  -  11 Dec 2019 05:03  --------------------------------------------------------  IN:    dexmedetomidine Infusion: 16.3 mL    dexmedetomidine Infusion: 89.7 mL    Enteral Tube Flush: 100 mL    fentaNYL Infusion.: 32.4 mL    multiple electrolytes Injection Type 1: 375 mL    multiple electrolytes Injection Type 1multiple electrolytes Injection Type 1: 1625 mL    norepinephrine Infusion: 52.7 mL    Solution: 200 mL    vasopressin Infusion: 5.4 mL    vasopressin Infusion: 5.4 mL    vasopressin Infusion: 5.4 mL  Total IN: 2507.3 mL    OUT:    Indwelling Catheter - Urethral: 775 mL    Nasoenteral Tube: 75 mL    VAC (Vacuum Assisted Closure) System: 450 mL  Total OUT: 1300 mL    Total NET: 1207.3 mL        piperacillin/tazobactam IVPB.. 3.375  enoxaparin Injectable 40  norepinephrine Infusion 0.15  piperacillin/tazobactam IVPB.. 3.375    PAST MEDICAL & SURGICAL HISTORY:  COPD with hypoxia  ETOHism  ETOH abuse  History of lumbosacral spine surgery  History of prostate surgery  History of appendectomy        Physical Exam:  General: NAD, resting comfortably in bed  Pulmonary: Nonlabored breathing, no respiratory distress  Cardiovascular: NSR  Abdominal: soft, NT/ND, Ilesostomy, NGT  : Martinez  Extremities: WWP      LABS:                        11.6   14.63 )-----------( 152      ( 11 Dec 2019 00:55 )             35.3     12-11    142  |  105  |  29<H>  ----------------------------<  92  3.8   |  19<L>  |  0.76    Ca    7.2<L>      11 Dec 2019 00:55  Phos  3.4     12-11  Mg     3.0     12-11    TPro  4.9<L>  /  Alb  2.2<L>  /  TBili  0.8  /  DBili  0.4<H>  /  AST  39  /  ALT  30  /  AlkPhos  44  12-11    PT/INR - ( 11 Dec 2019 00:55 )   PT: 10.4 sec;   INR: 0.91 ratio         PTT - ( 11 Dec 2019 00:55 )  PTT:22.5 sec  CAPILLARY BLOOD GLUCOSE          Radiology and Additional Studies:    Assessment:  The patient is a 74y Male who is now several hours post-op from an  Ex lap abdominal washout SBR and creation of end ileostomy    Plan:  - Pressor support  - Pain control as needed  - DVT ppx  - F/u AM labs  - Care per SICU

## 2019-12-11 NOTE — PROGRESS NOTE ADULT - ATTENDING COMMENTS
pt seen and examined.  agree with above.  now extubated.  wean off pressors as tolerated.  cont abx  await ostomy fxn.  oob with assistance  pt consult  enterostomal nurse f/u  condition guarded  d/w pt/family at bedside in detail.

## 2019-12-11 NOTE — PROGRESS NOTE ADULT - ASSESSMENT
74M s/p ex lap, MIRYAM, closure of enterotomy, abthera vac placement 12/8 early morning. RTOR for exlap/washout 12/9. s/p RTOR ex lap, resection of 150cm bowel, creation of colostomy.     Plan:  - Diet: NPO  - Labs: f/u post op AM labs  - c/w zosyn  - Pain medication  - Pressor support (vaso, levo), wean  - DVT ppx: Lovenox  - Activity: bed rest  - supportive care per SICU  - TPN per SICU    Red Team Surgery// [phone]

## 2019-12-12 LAB
ALBUMIN SERPL ELPH-MCNC: 1.8 G/DL — LOW (ref 3.3–5)
ALBUMIN SERPL ELPH-MCNC: 2 G/DL — LOW (ref 3.3–5)
ALBUMIN SERPL ELPH-MCNC: 2.6 G/DL — LOW (ref 3.3–5)
ALP SERPL-CCNC: 42 U/L — SIGNIFICANT CHANGE UP (ref 40–120)
ALP SERPL-CCNC: 43 U/L — SIGNIFICANT CHANGE UP (ref 40–120)
ALP SERPL-CCNC: 50 U/L — SIGNIFICANT CHANGE UP (ref 40–120)
ALT FLD-CCNC: 32 U/L — SIGNIFICANT CHANGE UP (ref 10–45)
ALT FLD-CCNC: 32 U/L — SIGNIFICANT CHANGE UP (ref 10–45)
ALT FLD-CCNC: 33 U/L — SIGNIFICANT CHANGE UP (ref 10–45)
ANION GAP SERPL CALC-SCNC: 11 MMOL/L — SIGNIFICANT CHANGE UP (ref 5–17)
ANION GAP SERPL CALC-SCNC: 12 MMOL/L — SIGNIFICANT CHANGE UP (ref 5–17)
ANION GAP SERPL CALC-SCNC: 13 MMOL/L — SIGNIFICANT CHANGE UP (ref 5–17)
APTT BLD: 29.3 SEC — SIGNIFICANT CHANGE UP (ref 27.5–36.3)
AST SERPL-CCNC: 39 U/L — SIGNIFICANT CHANGE UP (ref 10–40)
AST SERPL-CCNC: 44 U/L — HIGH (ref 10–40)
AST SERPL-CCNC: 66 U/L — HIGH (ref 10–40)
BILIRUB DIRECT SERPL-MCNC: 0.2 MG/DL — SIGNIFICANT CHANGE UP (ref 0–0.2)
BILIRUB DIRECT SERPL-MCNC: 0.2 MG/DL — SIGNIFICANT CHANGE UP (ref 0–0.2)
BILIRUB DIRECT SERPL-MCNC: 0.3 MG/DL — HIGH (ref 0–0.2)
BILIRUB INDIRECT FLD-MCNC: 0.4 MG/DL — SIGNIFICANT CHANGE UP (ref 0.2–1)
BILIRUB SERPL-MCNC: 0.6 MG/DL — SIGNIFICANT CHANGE UP (ref 0.2–1.2)
BILIRUB SERPL-MCNC: 0.6 MG/DL — SIGNIFICANT CHANGE UP (ref 0.2–1.2)
BILIRUB SERPL-MCNC: 0.7 MG/DL — SIGNIFICANT CHANGE UP (ref 0.2–1.2)
BUN SERPL-MCNC: 10 MG/DL — SIGNIFICANT CHANGE UP (ref 7–23)
BUN SERPL-MCNC: 14 MG/DL — SIGNIFICANT CHANGE UP (ref 7–23)
BUN SERPL-MCNC: 14 MG/DL — SIGNIFICANT CHANGE UP (ref 7–23)
CALCIUM SERPL-MCNC: 7.5 MG/DL — LOW (ref 8.4–10.5)
CALCIUM SERPL-MCNC: 7.6 MG/DL — LOW (ref 8.4–10.5)
CALCIUM SERPL-MCNC: 7.7 MG/DL — LOW (ref 8.4–10.5)
CHLORIDE SERPL-SCNC: 106 MMOL/L — SIGNIFICANT CHANGE UP (ref 96–108)
CHLORIDE SERPL-SCNC: 107 MMOL/L — SIGNIFICANT CHANGE UP (ref 96–108)
CHLORIDE SERPL-SCNC: 107 MMOL/L — SIGNIFICANT CHANGE UP (ref 96–108)
CO2 SERPL-SCNC: 22 MMOL/L — SIGNIFICANT CHANGE UP (ref 22–31)
CO2 SERPL-SCNC: 24 MMOL/L — SIGNIFICANT CHANGE UP (ref 22–31)
CO2 SERPL-SCNC: 28 MMOL/L — SIGNIFICANT CHANGE UP (ref 22–31)
CREAT SERPL-MCNC: 0.52 MG/DL — SIGNIFICANT CHANGE UP (ref 0.5–1.3)
CREAT SERPL-MCNC: 0.6 MG/DL — SIGNIFICANT CHANGE UP (ref 0.5–1.3)
CREAT SERPL-MCNC: 0.61 MG/DL — SIGNIFICANT CHANGE UP (ref 0.5–1.3)
GAS PNL BLDA: SIGNIFICANT CHANGE UP
GAS PNL BLDA: SIGNIFICANT CHANGE UP
GLUCOSE SERPL-MCNC: 145 MG/DL — HIGH (ref 70–99)
GLUCOSE SERPL-MCNC: 146 MG/DL — HIGH (ref 70–99)
GLUCOSE SERPL-MCNC: 150 MG/DL — HIGH (ref 70–99)
HCT VFR BLD CALC: 31.7 % — LOW (ref 39–50)
HCT VFR BLD CALC: 31.7 % — LOW (ref 39–50)
HCT VFR BLD CALC: 34.8 % — LOW (ref 39–50)
HGB BLD-MCNC: 10.4 G/DL — LOW (ref 13–17)
HGB BLD-MCNC: 10.5 G/DL — LOW (ref 13–17)
HGB BLD-MCNC: 11 G/DL — LOW (ref 13–17)
INR BLD: 1.02 RATIO — SIGNIFICANT CHANGE UP (ref 0.88–1.16)
MAGNESIUM SERPL-MCNC: 2.3 MG/DL — SIGNIFICANT CHANGE UP (ref 1.6–2.6)
MAGNESIUM SERPL-MCNC: 2.4 MG/DL — SIGNIFICANT CHANGE UP (ref 1.6–2.6)
MAGNESIUM SERPL-MCNC: 2.4 MG/DL — SIGNIFICANT CHANGE UP (ref 1.6–2.6)
MCHC RBC-ENTMCNC: 30 PG — SIGNIFICANT CHANGE UP (ref 27–34)
MCHC RBC-ENTMCNC: 30.9 PG — SIGNIFICANT CHANGE UP (ref 27–34)
MCHC RBC-ENTMCNC: 31.1 PG — SIGNIFICANT CHANGE UP (ref 27–34)
MCHC RBC-ENTMCNC: 31.6 GM/DL — LOW (ref 32–36)
MCHC RBC-ENTMCNC: 32.8 GM/DL — SIGNIFICANT CHANGE UP (ref 32–36)
MCHC RBC-ENTMCNC: 33.1 GM/DL — SIGNIFICANT CHANGE UP (ref 32–36)
MCV RBC AUTO: 93.8 FL — SIGNIFICANT CHANGE UP (ref 80–100)
MCV RBC AUTO: 94.1 FL — SIGNIFICANT CHANGE UP (ref 80–100)
MCV RBC AUTO: 94.8 FL — SIGNIFICANT CHANGE UP (ref 80–100)
NRBC # BLD: 0 /100 WBCS — SIGNIFICANT CHANGE UP (ref 0–0)
PHOSPHATE SERPL-MCNC: 1.8 MG/DL — LOW (ref 2.5–4.5)
PHOSPHATE SERPL-MCNC: 2 MG/DL — LOW (ref 2.5–4.5)
PHOSPHATE SERPL-MCNC: 3.2 MG/DL — SIGNIFICANT CHANGE UP (ref 2.5–4.5)
PLATELET # BLD AUTO: 132 K/UL — LOW (ref 150–400)
PLATELET # BLD AUTO: 142 K/UL — LOW (ref 150–400)
PLATELET # BLD AUTO: 154 K/UL — SIGNIFICANT CHANGE UP (ref 150–400)
POTASSIUM SERPL-MCNC: 3.4 MMOL/L — LOW (ref 3.5–5.3)
POTASSIUM SERPL-MCNC: 3.8 MMOL/L — SIGNIFICANT CHANGE UP (ref 3.5–5.3)
POTASSIUM SERPL-MCNC: 5.6 MMOL/L — HIGH (ref 3.5–5.3)
POTASSIUM SERPL-SCNC: 3.4 MMOL/L — LOW (ref 3.5–5.3)
POTASSIUM SERPL-SCNC: 3.8 MMOL/L — SIGNIFICANT CHANGE UP (ref 3.5–5.3)
POTASSIUM SERPL-SCNC: 5.6 MMOL/L — HIGH (ref 3.5–5.3)
PROT SERPL-MCNC: 4.8 G/DL — LOW (ref 6–8.3)
PROT SERPL-MCNC: 5.1 G/DL — LOW (ref 6–8.3)
PROT SERPL-MCNC: 5.2 G/DL — LOW (ref 6–8.3)
PROTHROM AB SERPL-ACNC: 11.6 SEC — SIGNIFICANT CHANGE UP (ref 10–12.9)
RBC # BLD: 3.37 M/UL — LOW (ref 4.2–5.8)
RBC # BLD: 3.38 M/UL — LOW (ref 4.2–5.8)
RBC # BLD: 3.67 M/UL — LOW (ref 4.2–5.8)
RBC # FLD: 14.3 % — SIGNIFICANT CHANGE UP (ref 10.3–14.5)
RBC # FLD: 14.4 % — SIGNIFICANT CHANGE UP (ref 10.3–14.5)
RBC # FLD: 14.7 % — HIGH (ref 10.3–14.5)
SODIUM SERPL-SCNC: 139 MMOL/L — SIGNIFICANT CHANGE UP (ref 135–145)
SODIUM SERPL-SCNC: 144 MMOL/L — SIGNIFICANT CHANGE UP (ref 135–145)
SODIUM SERPL-SCNC: 147 MMOL/L — HIGH (ref 135–145)
WBC # BLD: 12.66 K/UL — HIGH (ref 3.8–10.5)
WBC # BLD: 13.7 K/UL — HIGH (ref 3.8–10.5)
WBC # BLD: 15.09 K/UL — HIGH (ref 3.8–10.5)
WBC # FLD AUTO: 12.66 K/UL — HIGH (ref 3.8–10.5)
WBC # FLD AUTO: 13.7 K/UL — HIGH (ref 3.8–10.5)
WBC # FLD AUTO: 15.09 K/UL — HIGH (ref 3.8–10.5)

## 2019-12-12 PROCEDURE — 71045 X-RAY EXAM CHEST 1 VIEW: CPT | Mod: 26

## 2019-12-12 PROCEDURE — 99233 SBSQ HOSP IP/OBS HIGH 50: CPT

## 2019-12-12 RX ORDER — METOPROLOL TARTRATE 50 MG
5 TABLET ORAL EVERY 4 HOURS
Refills: 0 | Status: DISCONTINUED | OUTPATIENT
Start: 2019-12-13 | End: 2019-12-15

## 2019-12-12 RX ORDER — POTASSIUM PHOSPHATE, MONOBASIC POTASSIUM PHOSPHATE, DIBASIC 236; 224 MG/ML; MG/ML
15 INJECTION, SOLUTION INTRAVENOUS ONCE
Refills: 0 | Status: COMPLETED | OUTPATIENT
Start: 2019-12-12 | End: 2019-12-12

## 2019-12-12 RX ORDER — POTASSIUM CHLORIDE 20 MEQ
20 PACKET (EA) ORAL
Refills: 0 | Status: COMPLETED | OUTPATIENT
Start: 2019-12-12 | End: 2019-12-12

## 2019-12-12 RX ORDER — POTASSIUM CHLORIDE 20 MEQ
20 PACKET (EA) ORAL ONCE
Refills: 0 | Status: COMPLETED | OUTPATIENT
Start: 2019-12-12 | End: 2019-12-12

## 2019-12-12 RX ORDER — ALBUMIN HUMAN 25 %
250 VIAL (ML) INTRAVENOUS ONCE
Refills: 0 | Status: COMPLETED | OUTPATIENT
Start: 2019-12-12 | End: 2019-12-12

## 2019-12-12 RX ORDER — CHLORHEXIDINE GLUCONATE 213 G/1000ML
1 SOLUTION TOPICAL
Refills: 0 | Status: DISCONTINUED | OUTPATIENT
Start: 2019-12-12 | End: 2019-12-20

## 2019-12-12 RX ORDER — FUROSEMIDE 40 MG
20 TABLET ORAL ONCE
Refills: 0 | Status: COMPLETED | OUTPATIENT
Start: 2019-12-12 | End: 2019-12-12

## 2019-12-12 RX ORDER — METOPROLOL TARTRATE 50 MG
5 TABLET ORAL EVERY 6 HOURS
Refills: 0 | Status: DISCONTINUED | OUTPATIENT
Start: 2019-12-12 | End: 2019-12-12

## 2019-12-12 RX ORDER — ACETYLCYSTEINE 200 MG/ML
4 VIAL (ML) MISCELLANEOUS EVERY 6 HOURS
Refills: 0 | Status: COMPLETED | OUTPATIENT
Start: 2019-12-12 | End: 2019-12-15

## 2019-12-12 RX ADMIN — SODIUM CHLORIDE 50 MILLILITER(S): 9 INJECTION, SOLUTION INTRAVENOUS at 17:41

## 2019-12-12 RX ADMIN — PIPERACILLIN AND TAZOBACTAM 25 GRAM(S): 4; .5 INJECTION, POWDER, LYOPHILIZED, FOR SOLUTION INTRAVENOUS at 21:16

## 2019-12-12 RX ADMIN — Medication 5 MILLIGRAM(S): at 05:04

## 2019-12-12 RX ADMIN — Medication 62.5 MILLIMOLE(S): at 09:10

## 2019-12-12 RX ADMIN — Medication 4 MILLILITER(S): at 11:50

## 2019-12-12 RX ADMIN — POTASSIUM PHOSPHATE, MONOBASIC POTASSIUM PHOSPHATE, DIBASIC 62.5 MILLIMOLE(S): 236; 224 INJECTION, SOLUTION INTRAVENOUS at 05:06

## 2019-12-12 RX ADMIN — Medication 4 MILLILITER(S): at 17:14

## 2019-12-12 RX ADMIN — PIPERACILLIN AND TAZOBACTAM 25 GRAM(S): 4; .5 INJECTION, POWDER, LYOPHILIZED, FOR SOLUTION INTRAVENOUS at 05:06

## 2019-12-12 RX ADMIN — Medication 1000 MILLIGRAM(S): at 04:30

## 2019-12-12 RX ADMIN — Medication 5 MILLIGRAM(S): at 11:36

## 2019-12-12 RX ADMIN — Medication 250 MILLIMOLE(S): at 04:05

## 2019-12-12 RX ADMIN — Medication 400 MILLIGRAM(S): at 04:09

## 2019-12-12 RX ADMIN — Medication 3 MILLILITER(S): at 05:20

## 2019-12-12 RX ADMIN — Medication 4 MILLILITER(S): at 05:19

## 2019-12-12 RX ADMIN — SODIUM CHLORIDE 30 MILLILITER(S): 9 INJECTION, SOLUTION INTRAVENOUS at 11:34

## 2019-12-12 RX ADMIN — PIPERACILLIN AND TAZOBACTAM 25 GRAM(S): 4; .5 INJECTION, POWDER, LYOPHILIZED, FOR SOLUTION INTRAVENOUS at 14:02

## 2019-12-12 RX ADMIN — SODIUM CHLORIDE 50 MILLILITER(S): 9 INJECTION, SOLUTION INTRAVENOUS at 21:16

## 2019-12-12 RX ADMIN — Medication 3 MILLILITER(S): at 11:50

## 2019-12-12 RX ADMIN — Medication 5 MILLIGRAM(S): at 17:41

## 2019-12-12 RX ADMIN — Medication 5 MILLIGRAM(S): at 23:45

## 2019-12-12 RX ADMIN — CHLORHEXIDINE GLUCONATE 1 APPLICATION(S): 213 SOLUTION TOPICAL at 05:04

## 2019-12-12 RX ADMIN — Medication 50 MILLIEQUIVALENT(S): at 10:04

## 2019-12-12 RX ADMIN — Medication 0.5 MILLIGRAM(S): at 05:20

## 2019-12-12 RX ADMIN — Medication 20 MILLIGRAM(S): at 11:36

## 2019-12-12 RX ADMIN — SODIUM CHLORIDE 100 MILLILITER(S): 9 INJECTION, SOLUTION INTRAVENOUS at 07:40

## 2019-12-12 RX ADMIN — Medication 3 MILLILITER(S): at 17:13

## 2019-12-12 RX ADMIN — Medication 50 MILLIEQUIVALENT(S): at 14:02

## 2019-12-12 RX ADMIN — Medication 3 MILLILITER(S): at 00:25

## 2019-12-12 RX ADMIN — Medication 50 MILLIEQUIVALENT(S): at 17:41

## 2019-12-12 RX ADMIN — Medication 750 MILLILITER(S): at 05:23

## 2019-12-12 RX ADMIN — Medication 0.5 MILLIGRAM(S): at 17:14

## 2019-12-12 RX ADMIN — Medication 50 MILLIEQUIVALENT(S): at 17:07

## 2019-12-12 RX ADMIN — ENOXAPARIN SODIUM 40 MILLIGRAM(S): 100 INJECTION SUBCUTANEOUS at 11:36

## 2019-12-12 NOTE — PROVIDER CONTACT NOTE (OTHER) - SITUATION
pt had an episode of emesis while BiPAP mask was on. RN was in the room and able to remove mask quickly but it is unclear if pt aspirated

## 2019-12-12 NOTE — PROGRESS NOTE ADULT - ASSESSMENT
74M s/p ex lap, MIRYAM, closure of enterotomy, abthera vac placement 12/8 early morning. RTOR for exlap/washout 12/9. s/p RTOR ex lap, resection of 150cm bowel, creation of colostomy.     Plan:  - Diet: NPO  - Labs: f/u AM labs  - c/w zosyn  - Pain medication  - DVT ppx: Lovenox  - Activity: bed rest  - supportive care per SICU  - TPN per SICU  - Monitor ostomy output    [employer] Team Surgery  [id number] 74M s/p ex lap, MIRYAM, closure of enterotomy, abthera vac placement 12/8 early morning. RTOR for exlap/washout 12/9. s/p RTOR ex lap, resection of 150cm bowel, creation of colostomy.     Plan:  - Diet: NPO  - Labs: f/u AM labs  - c/w zosyn  - Pain medication  - DVT ppx: Lovenox  - Activity: bed rest  - supportive care per SICU  - TPN per SICU  - Monitor ostomy output  - Potential dc rosas    [employer] Team Surgery  [id number]

## 2019-12-12 NOTE — PROGRESS NOTE ADULT - SUBJECTIVE AND OBJECTIVE BOX
Subjective: Patient seen and examined. No new events except as noted.   Remains in ICU   Extubated at 4pm on 12/11  Tachycardic overnight. Found to be in SVT on EKG. Controlled with lopressor push x1    REVIEW OF SYSTEMS:    CONSTITUTIONAL:+ weakness, fevers or chills  EYES/ENT: No visual changes;  No vertigo or throat pain   NECK: No pain or stiffness  RESPIRATORY: No cough, wheezing, hemoptysis; No shortness of breath  CARDIOVASCULAR: No chest pain or palpitations  GASTROINTESTINAL:+ abdominal or epigastric pain. No nausea, vomiting, or hematemesis; No diarrhea or constipation. No melena or hematochezia.  GENITOURINARY: No dysuria, frequency or hematuria  NEUROLOGICAL: No numbness or weakness  SKIN: No itching, burning, rashes, or lesions   All other review of systems is negative unless indicated above.    MEDICATIONS:  MEDICATIONS  (STANDING):  acetylcysteine 20%  Inhalation 4 milliLiter(s) Inhalation every 6 hours  albuterol/ipratropium for Nebulization 3 milliLiter(s) Nebulizer every 6 hours  buDESOnide    Inhalation Suspension 0.5 milliGRAM(s) Inhalation every 12 hours  chlorhexidine 2% Cloths 1 Application(s) Topical <User Schedule>  dextrose 5% + lactated ringers. 1000 milliLiter(s) (30 mL/Hr) IV Continuous <Continuous>  enoxaparin Injectable 40 milliGRAM(s) SubCutaneous daily  furosemide   Injectable 20 milliGRAM(s) IV Push once  metoprolol tartrate Injectable 5 milliGRAM(s) IV Push every 6 hours  piperacillin/tazobactam IVPB.. 3.375 Gram(s) IV Intermittent every 8 hours      PHYSICAL EXAM:  T(C): 37.4 (12-12-19 @ 07:00), Max: 37.4 (12-12-19 @ 07:00)  HR: 107 (12-12-19 @ 11:11) (81 - 139)  BP: 127/59 (12-12-19 @ 10:00) (92/50 - 150/76)  RR: 21 (12-12-19 @ 10:00) (20 - 34)  SpO2: 100% (12-12-19 @ 11:11) (83% - 100%)  Wt(kg): --  I&O's Summary    11 Dec 2019 07:01  -  12 Dec 2019 07:00  --------------------------------------------------------  IN: 3786 mL / OUT: 3530 mL / NET: 256 mL    12 Dec 2019 07:01  -  12 Dec 2019 11:31  --------------------------------------------------------  IN: 325 mL / OUT: 60 mL / NET: 265 mL            Appearance: Extubated, Lethargic   HEENT:   Dry oral mucosa   Lymphatic: No lymphadenopathy  Cardiovascular: Normal S1 S2, No JVD, No murmurs , Peripheral pulses palpable 2+ bilaterally  Respiratory: Decreased bs   Gastrointestinal:  Appropriately tender, +VAC  Skin: No rashes, No ecchymoses, No cyanosis, warm to touch  Musculoskeletal: Decreased  range of motion and strength  Psychiatry:  mildly sedated   Ext: No edema        LABS:    CARDIAC MARKERS:                                10.4   13.70 )-----------( 132      ( 12 Dec 2019 01:27 )             31.7     12-12    144  |  107  |  14  ----------------------------<  150<H>  3.8   |  24  |  0.61    Ca    7.6<L>      12 Dec 2019 01:27  Phos  1.8     12-12  Mg     2.4     12-12    TPro  4.8<L>  /  Alb  2.0<L>  /  TBili  0.6  /  DBili  0.2  /  AST  39  /  ALT  32  /  AlkPhos  43  12-12    proBNP:   Lipid Profile:   HgA1c:   TSH:             TELEMETRY: 	 SR   ECG:  	  RADIOLOGY: < from: Xray Chest 1 View- PORTABLE-Urgent (12.12.19 @ 05:29) >    EXAM:  XR CHEST PORTABLE URGENT 1V                            PROCEDURE DATE:  12/12/2019            INTERPRETATION:  A single chest x-ray was obtained on December 12, 2019.    Indication: NG tube placement.    Impression:    Suboptimal study. The left costophrenic angle is not included in this   study. Right pleural effusion. Bibasilar pneumonia cannot be ruled out   entirely. NG tube is in the stomach. A central line is seen on the right   and the tip is in the superior vena cava. No pneumothorax.                    TRUDI DIETZ M.D., ATTENDING RADIOLOGIST  This document has been electronically signed. Dec 12 2019 10:15AM                < end of copied text >    DIAGNOSTIC TESTING:  [ ] Echocardiogram:  [ ]  Catheterization:  [ ] Stress Test:    OTHER:

## 2019-12-12 NOTE — PROGRESS NOTE ADULT - SUBJECTIVE AND OBJECTIVE BOX
GENERAL SURGERY DAILY PROGRESS NOTE:    Interval:  24 HOUR EVENTS:  - Extubated at 4pm on 12/11.   - Acidotic after extubation, placed on Bipap 12/5 initially without much improvement, increased to 15/5.   - Started on Duonebs and Pulmicort   - Tachycardic overnight. Found to be in SVT on EKG. Controlled with lopressor push x1.   - An episode of emesis reported ovn    Subjective:  Patient seen and examined this am.   Exhibits a cough  Counselled NGT OOB    OBJECTIVE:    ICU Vital Signs Last 24 Hrs  T(C): 37.4 (12 Dec 2019 07:00), Max: 37.5 (11 Dec 2019 11:00)  T(F): 99.3 (12 Dec 2019 07:00), Max: 99.5 (11 Dec 2019 11:00)  HR: 96 (12 Dec 2019 10:00) (81 - 139)  BP: 127/59 (12 Dec 2019 10:00) (92/50 - 150/76)  BP(mean): 85 (12 Dec 2019 10:00) (63 - 107)  ABP: 123/76 (12 Dec 2019 06:00) (77/60 - 201/152)  ABP(mean): 92 (12 Dec 2019 06:00) (58 - 185)  RR: 21 (12 Dec 2019 10:00) (20 - 34)  SpO2: 100% (12 Dec 2019 10:00) (83% - 100%)    Exam:  Gen: NAD, resting in bed  Abd: NGT. Soft, ND. Markedly decreased tenderness. VAC in place. No erythema/induration. Dressing changed  : Martinez clear yellow  Ext: No edema, WWP wrist restraints ICDs    LABS:                        10.4   13.70 )-----------( 132      ( 12 Dec 2019 01:27 )             31.7     12-12    144  |  107  |  14  ----------------------------<  150<H>  3.8   |  24  |  0.61    Ca    7.6<L>      12 Dec 2019 01:27  Phos  1.8     12-12  Mg     2.4     12-12    TPro  4.8<L>  /  Alb  2.0<L>  /  TBili  0.6  /  DBili  0.2  /  AST  39  /  ALT  32  /  AlkPhos  43  12-12

## 2019-12-12 NOTE — PROGRESS NOTE ADULT - ATTENDING COMMENTS
Remains extubated  Off BiPAP, saturating well, hypercapnia resolved  CXR reviewed, has pulmonary vascular congestion, decrease IVF rate, one dose Lasix  Add end tidal to assess for any developing hypercapnia  OOB mobilization

## 2019-12-12 NOTE — PROGRESS NOTE ADULT - SUBJECTIVE AND OBJECTIVE BOX
HISTORY  74 year old male with a past medical history of COPD and EtOH dependence who presented today with abdominal pain, nausea, hematemesis, and poor PO intake for ~2-3 days. Labs significant for an CHI w/ Cr 2.74 and lactate of 9.4. He was also notably tachycardic to the 110s and hypotensive w/ SBP in the 70s. He was given 2 units of PRBCs and 2 L of LR in the ED due to concern for upper GI bleeding. Imaging revealed high grade SBO in the RLQ. Patient was taken to the OR emergently for an exploratory laparotomy, lysis of adhesions, decompression of bowel via enterotomy w/ primary repair, and Abthera VAC placement. Of note, the distal 50% of the bowel appeared dusky but viable. He required vasopressor support with phenylephrine and vasopressin infusions. He received 3000 mL of crystalloid w/ EBL of 10 mL and UOP of 25 mL. Patient was left intubated at the end of the case so SICU was consulted for hemodynamic monitoring. Taken to the OR on 12/6 and underwent Ex-lap with MIRYAM, enterotomy for bowel decompression and Abthera VAC placement. Taken back to the OR on 12/8 and underwent take down of Abthera, washout and re-application of the Abthera vac. Went back to OR on 12/10 night for colectomy.    24 HOUR EVENTS:  - Extubated at 4pm on 12/11.   - Acidotic after extubation, placed on Bipap 12/5 initially without much improvement, increased to 15/5.   - Started on Duonebs and Pulmicort   - Tachycardic overnight. Found to be in SVT on EKG. Controlled with lopressor push x1.     SUBJECTIVE/ROS: On bipap, no issues.   [x] A ten-point review of systems was otherwise negative except as noted.  [ ] Due to altered mental status/intubation, subjective information were not able to be obtained from the patient. History was obtained, to the extent possible, from review of the chart and collateral sources of information.      NEURO  Exam: Alert to stimuli.   Meds: acetaminophen  IVPB .. 1000 milliGRAM(s) IV Intermittent every 6 hours PRN Moderate Pain (4 - 6)  ketorolac   Injectable 15 milliGRAM(s) IV Push every 6 hours PRN Severe Pain (7 - 10)    [x] Adequacy of sedation and pain control has been assessed and adjusted      RESPIRATORY  RR: 31 (12-12-19 @ 02:00) (20 - 34)  SpO2: 100% (12-12-19 @ 02:00) (83% - 100%)  Wt(kg): --  Exam: unlabored, clear to auscultation bilaterally  Mechanical Ventilation: Mode: CPAP with PS, RR (patient): 24, FiO2: 30, PEEP: 5, PS: 0.5, MAP: 6, PIP: 14  ABG - ( 12 Dec 2019 01:27 )  pH: 7.39  /  pCO2: 46    /  pO2: 138   / HCO3: 27    / Base Excess: 2.1   /  SaO2: 99      Lactate: x                [ ] Extubation Readiness Assessed  Meds: acetylcysteine 20%  Inhalation 4 milliLiter(s) Inhalation every 6 hours  albuterol/ipratropium for Nebulization 3 milliLiter(s) Nebulizer every 6 hours  buDESOnide    Inhalation Suspension 0.5 milliGRAM(s) Inhalation every 12 hours        CARDIOVASCULAR  HR: 107 (12-12-19 @ 02:00) (67 - 139)  BP: 108/71 (12-12-19 @ 02:00) (91/40 - 150/76)  BP(mean): 81 (12-12-19 @ 02:00) (58 - 107)  ABP: 143/62 (12-12-19 @ 02:00) (44/38 - 201/152)  ABP(mean): 92 (12-12-19 @ 02:00) (40 - 185)  Wt(kg): --  CVP(cm H2O): --      Exam: RRR, s1s2   Meds: metoprolol tartrate Injectable 5 milliGRAM(s) IV Push every 6 hours  norepinephrine Infusion 0.15 MICROgram(s)/kG/Min IV Continuous <Continuous>        GI/NUTRITION  Exam: Soft, post op   Diet: NPO on BiPAP  Meds: pantoprazole  Injectable 40 milliGRAM(s) IV Push daily      GENITOURINARY  I&O's Detail    12-10 @ 07:01  -  12-11 @ 07:00  --------------------------------------------------------  IN:    dexmedetomidine Infusion: 75.8 mL    dexmedetomidine Infusion: 89.7 mL    Enteral Tube Flush: 150 mL    fentaNYL Infusion.: 32.4 mL    IV PiggyBack: 25 mL    multiple electrolytes Injection Type 1multiple electrolytes Injection Type 1: 1000 mL    multiple electrolytes Injection Type 1multiple electrolytes Injection Type 1: 1625 mL    norepinephrine Infusion: 147.2 mL    Solution: 300 mL    vasopressin Infusion: 5.4 mL    vasopressin Infusion: 5.4 mL    vasopressin Infusion: 12.6 mL    vasopressin Infusion: 1.8 mL  Total IN: 3470.3 mL    OUT:    Bulb: 20 mL    Indwelling Catheter - Urethral: 1030 mL    Nasoenteral Tube: 325 mL    VAC (Vacuum Assisted Closure) System: 450 mL  Total OUT: 1825 mL    Total NET: 1645.3 mL      12-11 @ 07:01  -  12-12 @ 02:29  --------------------------------------------------------  IN:    dexmedetomidine Infusion: 36.5 mL    dextrose 5% + lactated ringers.: 1700 mL    Enteral Tube Flush: 30 mL    IV PiggyBack: 325 mL    multiple electrolytes Injection Type 1multiple electrolytes Injection Type 1: 250 mL    norepinephrine Infusion: 68.2 mL    vasopressin Infusion: 28.8 mL  Total IN: 2438.5 mL    OUT:    Bulb: 480 mL    Ileostomy: 1245 mL    Indwelling Catheter - Urethral: 840 mL  Total OUT: 2565 mL    Total NET: -126.5 mL          12-12    144  |  107  |  14  ----------------------------<  150<H>  3.8   |  24  |  0.61    Ca    7.6<L>      12 Dec 2019 01:27  Phos  1.8     12-12  Mg     2.4     12-12    TPro  4.8<L>  /  Alb  2.0<L>  /  TBili  0.6  /  DBili  0.2  /  AST  39  /  ALT  32  /  AlkPhos  43  12-12    [ ] Martinez catheter, indication: N/A  Meds: dextrose 5% + lactated ringers. 1000 milliLiter(s) IV Continuous <Continuous>  potassium phosphate IVPB 15 milliMole(s) IV Intermittent once  sodium phosphate IVPB 15 milliMole(s) IV Intermittent once        HEMATOLOGIC  Meds: enoxaparin Injectable 40 milliGRAM(s) SubCutaneous daily    [x] VTE Prophylaxis                        10.4   13.70 )-----------( 132      ( 12 Dec 2019 01:27 )             31.7     PT/INR - ( 12 Dec 2019 00:17 )   PT: 11.6 sec;   INR: 1.02 ratio         PTT - ( 12 Dec 2019 00:17 )  PTT:29.3 sec  Transfusion     [ ] PRBC   [ ] Platelets   [ ] FFP   [ ] Cryoprecipitate      INFECTIOUS DISEASES  T(C): 37 (12-11-19 @ 23:00), Max: 37.5 (12-11-19 @ 11:00)  Wt(kg): --  WBC Count: 13.70 K/uL (12-12 @ 01:27)  WBC Count: 12.66 K/uL (12-12 @ 00:17)  WBC Count: 11.89 K/uL (12-11 @ 11:58)    Recent Cultures:  Specimen Source: .Blood Blood, 12-06 @ 22:20; Results   No growth at 5 days.; Gram Stain: --; Organism: --    Meds: piperacillin/tazobactam IVPB.. 3.375 Gram(s) IV Intermittent every 8 hours        ENDOCRINE  Capillary Blood Glucose    Meds: vasopressin Infusion 0.03 Unit(s)/Min IV Continuous <Continuous>        ACCESS DEVICES:  [ ] Peripheral IV  [x] Central Venous Line	[x] R	[ ] L	[x] IJ	[ ] Fem	[ ] SC	Placed:   [x] Arterial Line		[ ] R	[x] L	[ ] Fem	[x] Rad	[ ] Ax	Placed:   [ ] PICC:					[ ] Mediport  [ ] Urinary Catheter, Date Placed:   [ ] Necessity of urinary, arterial, and venous catheters discussed    OTHER MEDICATIONS:  chlorhexidine 2% Cloths 1 Application(s) Topical daily

## 2019-12-12 NOTE — PROGRESS NOTE ADULT - ASSESSMENT
75 y/o M presenting with septic shock and high grade SBO s/p exploratory laparotomy, lysis of adhesions, decompression of bowel via enterotomy w/ primary repair, and Abthera VAC placement on 12/6; s/p take down of Abthera, washout and re-application of the Abthera vac on 12/8. Now s/p SBR and end ileostomy on 12/10.     PLAN:    Neuro: Patient extubated on 12/11, off sedation now.   - Pain control with toradol     Resp: acute respiratory failure upon initial presentation  - Extubated to Bipap, on 15/5. Continue to assess for reintubation.   - Trend ABG and lactate q4 hours   - Daily CXR while intubated  - Vania    CV: septic shock requiring vasopressor support  - On minimal requirements of vaso and levo, maintain MAP >65  - Tachy overnight, in SVT. Controlled with lopressor push.   - Lactate 1.1    GI: SBO s/p exploratory laparotomy, lysis of adhesions, decompression of bowel via enterotomy w/ primary repair, and Abthera VAC placement. \  - Patient now s/p OR  for SBR with end ileostomy on 12/10. Abdomen closed.   - Protonix for stress ulcer prophylaxis      Renal: CHI   - D5/LR @ 100   - Martinez, strict I&Os    Heme: no acute issues  - Lovenox for VTE prophylaxis  - Venodynes    ID: septic shock 2/2 intra abdominal infection  - Continue Zosyn    Endo: no acute issues  - Monitor glucose on BMP

## 2019-12-12 NOTE — CHART NOTE - NSCHARTNOTEFT_GEN_A_CORE
Nutrition Follow Up Note    Patient seen for: malnutrition follow up on 8ICU    Source: RN, medical record    Chart reviewed, events noted. "75 y/o M presenting with septic shock and high grade SBO s/p exploratory laparotomy, lysis of adhesions, decompression of bowel via enterotomy w/ primary repair, and Abthera VAC placement on ; s/p take down of Abthera, washout and re-application of the Abthera vac on . Now s/p SBR and end ileostomy on 12/10."    Nutrition Status: Severe Malnutrition. Pt NPO x 7 days S/P GI surgery x 3; 150 cm small bowel remaining. Pt extubated , with episode of emesis . NGT self-removed .    Diet : NPO    NGT output x 24-hours: 300ml; discontinued     Last emesis:     Ileostomy output x 24-hours: 1515ml    Daily Weight in k.4 (-), Weight in k.6 (), Weight in k.3 (12-10), Weight in k.2 (), Weight in k.1 (), Weight in k.5 (), Weight in k.5 (); weight gain likely reflects fluid shifts/edema.    Drug Dosing Weight  Weight (kg): 54.2 (06 Dec 2019 22:12)  BMI (kg/m2): 17.6 (09 Dec 2019 09:00)    Pertinent Medications: MEDICATIONS  (STANDING):  acetylcysteine 20%  Inhalation 4 milliLiter(s) Inhalation every 6 hours  albuterol/ipratropium for Nebulization 3 milliLiter(s) Nebulizer every 6 hours  buDESOnide    Inhalation Suspension 0.5 milliGRAM(s) Inhalation every 12 hours  chlorhexidine 2% Cloths 1 Application(s) Topical <User Schedule>  dextrose 5% + lactated ringers. 1000 milliLiter(s) (100 mL/Hr) IV Continuous <Continuous>  enoxaparin Injectable 40 milliGRAM(s) SubCutaneous daily  metoprolol tartrate Injectable 5 milliGRAM(s) IV Push every 6 hours  piperacillin/tazobactam IVPB.. 3.375 Gram(s) IV Intermittent every 8 hours  potassium chloride  20 mEq/100 mL IVPB 20 milliEquivalent(s) IV Intermittent once  sodium phosphate IVPB 15 milliMole(s) IV Intermittent once    MEDICATIONS  (PRN):  acetaminophen  IVPB .. 1000 milliGRAM(s) IV Intermittent every 6 hours PRN Moderate Pain (4 - 6)  ketorolac   Injectable 15 milliGRAM(s) IV Push every 6 hours PRN Severe Pain (7 - 10)    LABS:    @ 01:27: Sodium 144, Potassium 3.8, Chloride 107, Calcium 7.6<L>, Magnesium 2.4, Phosphorus 1.8<L>, BUN 14, Creatinine 0.61, <H>, Alk Phos 43, ALT/SGPT 32, AST/SGOT 39, Total Protein 4.8<L>, Albumin 2.0<L>, Total Bilirubin 0.6, Direct Bilirubin 0.2, Hemoglobin 10.4<L>, Hematocrit 31.7<L>   @ 00:17: Sodium 139, Potassium 5.6<H>, Chloride 106, Calcium 7.5<L>, Magnesium 2.4, Phosphorus 2.0<L>, BUN 14, Creatinine 0.52, <H>, Alk Phos 42, ALT/SGPT 32, AST/SGOT 66<H>, Total Protein 5.1<L>, Albumin 1.8<L>, Total Bilirubin 0.6, Direct Bilirubin 0.2, Hemoglobin 10.5<L>, Hematocrit 31.7<L>    Skin per nursing documentation: no pressure injuries noted  Edema: 2+ generalized    Estimated Needs: based on dosing wt 54.2Kg  5525-2595 gregorio/day (25-30cal/Kg)  76-87 Gm protein/day (1.4-1.6Gm/Kg)     Previous Nutrition Diagnosis: Severe malnutrition  Nutrition Diagnosis is: ongoing    New Nutrition Diagnosis: none     Interventions: initiate nutrition via tolerated route as medically feasible    Recommend  1) Pending tolerance to clear liquids, advance to Low Fiber diet  2) When diet is advanced, add 3 servings Ensure Enlive (350cal, 20Gm protein per 8oz serving)   3) If EN is warranted, initiate Vital1.2 at 10ml/hr, increase as tolerated to goal rate 50ml/hour x 24 hours to provide 1200ml formula, 1440cal/day, 90Gm protein/day, 973ml free water; meets 27cal/Kg and 1.7Gm protein/Kg per dosing wt 54.2Kg.    Monitoring and Evaluation:     Continue to monitor nutritional intake, tolerance to diet prescription, weights, labs, skin integrity.    RD remains available upon request and will follow up per protocol.    Sowmya Graham MS RD CDN [facility], Pager # [phone]

## 2019-12-13 LAB
ALBUMIN SERPL ELPH-MCNC: 2.1 G/DL — LOW (ref 3.3–5)
ALBUMIN SERPL ELPH-MCNC: 2.5 G/DL — LOW (ref 3.3–5)
ALP SERPL-CCNC: 48 U/L — SIGNIFICANT CHANGE UP (ref 40–120)
ALP SERPL-CCNC: 57 U/L — SIGNIFICANT CHANGE UP (ref 40–120)
ALT FLD-CCNC: 30 U/L — SIGNIFICANT CHANGE UP (ref 10–45)
ALT FLD-CCNC: 31 U/L — SIGNIFICANT CHANGE UP (ref 10–45)
ANION GAP SERPL CALC-SCNC: 11 MMOL/L — SIGNIFICANT CHANGE UP (ref 5–17)
ANION GAP SERPL CALC-SCNC: 7 MMOL/L — SIGNIFICANT CHANGE UP (ref 5–17)
APTT BLD: 30 SEC — SIGNIFICANT CHANGE UP (ref 27.5–36.3)
AST SERPL-CCNC: 29 U/L — SIGNIFICANT CHANGE UP (ref 10–40)
AST SERPL-CCNC: 30 U/L — SIGNIFICANT CHANGE UP (ref 10–40)
BASE EXCESS BLDV CALC-SCNC: 10.6 MMOL/L — HIGH (ref -2–2)
BILIRUB DIRECT SERPL-MCNC: 0.2 MG/DL — SIGNIFICANT CHANGE UP (ref 0–0.2)
BILIRUB DIRECT SERPL-MCNC: 0.2 MG/DL — SIGNIFICANT CHANGE UP (ref 0–0.2)
BILIRUB INDIRECT FLD-MCNC: 0.4 MG/DL — SIGNIFICANT CHANGE UP (ref 0.2–1)
BILIRUB INDIRECT FLD-MCNC: 0.5 MG/DL — SIGNIFICANT CHANGE UP (ref 0.2–1)
BILIRUB SERPL-MCNC: 0.6 MG/DL — SIGNIFICANT CHANGE UP (ref 0.2–1.2)
BILIRUB SERPL-MCNC: 0.7 MG/DL — SIGNIFICANT CHANGE UP (ref 0.2–1.2)
BUN SERPL-MCNC: 6 MG/DL — LOW (ref 7–23)
BUN SERPL-MCNC: 7 MG/DL — SIGNIFICANT CHANGE UP (ref 7–23)
CA-I SERPL-SCNC: 1.02 MMOL/L — LOW (ref 1.12–1.3)
CALCIUM SERPL-MCNC: 7.6 MG/DL — LOW (ref 8.4–10.5)
CALCIUM SERPL-MCNC: 7.6 MG/DL — LOW (ref 8.4–10.5)
CHLORIDE BLDV-SCNC: 107 MMOL/L — SIGNIFICANT CHANGE UP (ref 96–108)
CHLORIDE SERPL-SCNC: 104 MMOL/L — SIGNIFICANT CHANGE UP (ref 96–108)
CHLORIDE SERPL-SCNC: 108 MMOL/L — SIGNIFICANT CHANGE UP (ref 96–108)
CHOLEST SERPL-MCNC: 62 MG/DL — SIGNIFICANT CHANGE UP (ref 10–199)
CO2 BLDV-SCNC: 37 MMOL/L — HIGH (ref 22–30)
CO2 SERPL-SCNC: 30 MMOL/L — SIGNIFICANT CHANGE UP (ref 22–31)
CO2 SERPL-SCNC: 33 MMOL/L — HIGH (ref 22–31)
CREAT SERPL-MCNC: 0.52 MG/DL — SIGNIFICANT CHANGE UP (ref 0.5–1.3)
CREAT SERPL-MCNC: 0.56 MG/DL — SIGNIFICANT CHANGE UP (ref 0.5–1.3)
GAS PNL BLDV: 140 MMOL/L — SIGNIFICANT CHANGE UP (ref 135–145)
GAS PNL BLDV: SIGNIFICANT CHANGE UP
GLUCOSE BLDC GLUCOMTR-MCNC: 110 MG/DL — HIGH (ref 70–99)
GLUCOSE BLDC GLUCOMTR-MCNC: 92 MG/DL — SIGNIFICANT CHANGE UP (ref 70–99)
GLUCOSE BLDV-MCNC: 113 MG/DL — HIGH (ref 70–99)
GLUCOSE SERPL-MCNC: 109 MG/DL — HIGH (ref 70–99)
GLUCOSE SERPL-MCNC: 116 MG/DL — HIGH (ref 70–99)
HCO3 BLDV-SCNC: 36 MMOL/L — HIGH (ref 21–29)
HCT VFR BLD CALC: 31.6 % — LOW (ref 39–50)
HCT VFR BLD CALC: 35.7 % — LOW (ref 39–50)
HCT VFR BLDA CALC: 35 % — LOW (ref 39–50)
HDLC SERPL-MCNC: 18 MG/DL — LOW
HGB BLD CALC-MCNC: 11.5 G/DL — LOW (ref 13–17)
HGB BLD-MCNC: 10.3 G/DL — LOW (ref 13–17)
HGB BLD-MCNC: 11.9 G/DL — LOW (ref 13–17)
HOROWITZ INDEX BLDV+IHG-RTO: 28 — SIGNIFICANT CHANGE UP
INR BLD: 1.13 RATIO — SIGNIFICANT CHANGE UP (ref 0.88–1.16)
LACTATE BLDV-MCNC: 1.2 MMOL/L — SIGNIFICANT CHANGE UP (ref 0.7–2)
LIPID PNL WITH DIRECT LDL SERPL: 25 MG/DL — SIGNIFICANT CHANGE UP
MAGNESIUM SERPL-MCNC: 1.9 MG/DL — SIGNIFICANT CHANGE UP (ref 1.6–2.6)
MAGNESIUM SERPL-MCNC: 2 MG/DL — SIGNIFICANT CHANGE UP (ref 1.6–2.6)
MCHC RBC-ENTMCNC: 30.7 PG — SIGNIFICANT CHANGE UP (ref 27–34)
MCHC RBC-ENTMCNC: 30.9 PG — SIGNIFICANT CHANGE UP (ref 27–34)
MCHC RBC-ENTMCNC: 32.6 GM/DL — SIGNIFICANT CHANGE UP (ref 32–36)
MCHC RBC-ENTMCNC: 33.3 GM/DL — SIGNIFICANT CHANGE UP (ref 32–36)
MCV RBC AUTO: 92.7 FL — SIGNIFICANT CHANGE UP (ref 80–100)
MCV RBC AUTO: 94 FL — SIGNIFICANT CHANGE UP (ref 80–100)
NRBC # BLD: 0 /100 WBCS — SIGNIFICANT CHANGE UP (ref 0–0)
NRBC # BLD: 0 /100 WBCS — SIGNIFICANT CHANGE UP (ref 0–0)
OTHER CELLS CSF MANUAL: 8 ML/DL — LOW (ref 18–22)
PCO2 BLDV: 49 MMHG — SIGNIFICANT CHANGE UP (ref 35–50)
PH BLDV: 7.47 — HIGH (ref 7.35–7.45)
PHOSPHATE SERPL-MCNC: 1.9 MG/DL — LOW (ref 2.5–4.5)
PHOSPHATE SERPL-MCNC: 3.1 MG/DL — SIGNIFICANT CHANGE UP (ref 2.5–4.5)
PLATELET # BLD AUTO: 147 K/UL — LOW (ref 150–400)
PLATELET # BLD AUTO: 186 K/UL — SIGNIFICANT CHANGE UP (ref 150–400)
PO2 BLDV: 28 MMHG — SIGNIFICANT CHANGE UP (ref 25–45)
POTASSIUM BLDV-SCNC: 3.2 MMOL/L — LOW (ref 3.5–5.3)
POTASSIUM SERPL-MCNC: 3.6 MMOL/L — SIGNIFICANT CHANGE UP (ref 3.5–5.3)
POTASSIUM SERPL-MCNC: 3.6 MMOL/L — SIGNIFICANT CHANGE UP (ref 3.5–5.3)
POTASSIUM SERPL-SCNC: 3.6 MMOL/L — SIGNIFICANT CHANGE UP (ref 3.5–5.3)
POTASSIUM SERPL-SCNC: 3.6 MMOL/L — SIGNIFICANT CHANGE UP (ref 3.5–5.3)
PREALB SERPL-MCNC: 5 MG/DL — LOW (ref 20–40)
PROT SERPL-MCNC: 4.7 G/DL — LOW (ref 6–8.3)
PROT SERPL-MCNC: 5.2 G/DL — LOW (ref 6–8.3)
PROTHROM AB SERPL-ACNC: 13.1 SEC — HIGH (ref 10–12.9)
RBC # BLD: 3.36 M/UL — LOW (ref 4.2–5.8)
RBC # BLD: 3.85 M/UL — LOW (ref 4.2–5.8)
RBC # FLD: 14.3 % — SIGNIFICANT CHANGE UP (ref 10.3–14.5)
RBC # FLD: 14.4 % — SIGNIFICANT CHANGE UP (ref 10.3–14.5)
SAO2 % BLDV: 51 % — LOW (ref 67–88)
SODIUM SERPL-SCNC: 145 MMOL/L — SIGNIFICANT CHANGE UP (ref 135–145)
SODIUM SERPL-SCNC: 148 MMOL/L — HIGH (ref 135–145)
TOTAL CHOLESTEROL/HDL RATIO MEASUREMENT: 3.4 RATIO — SIGNIFICANT CHANGE UP (ref 3.4–9.6)
TRIGL SERPL-MCNC: 93 MG/DL — SIGNIFICANT CHANGE UP (ref 10–149)
WBC # BLD: 15.99 K/UL — HIGH (ref 3.8–10.5)
WBC # BLD: 18.37 K/UL — HIGH (ref 3.8–10.5)
WBC # FLD AUTO: 15.99 K/UL — HIGH (ref 3.8–10.5)
WBC # FLD AUTO: 18.37 K/UL — HIGH (ref 3.8–10.5)

## 2019-12-13 PROCEDURE — 99222 1ST HOSP IP/OBS MODERATE 55: CPT

## 2019-12-13 PROCEDURE — 71045 X-RAY EXAM CHEST 1 VIEW: CPT | Mod: 26

## 2019-12-13 PROCEDURE — 74018 RADEX ABDOMEN 1 VIEW: CPT | Mod: 26

## 2019-12-13 PROCEDURE — 99232 SBSQ HOSP IP/OBS MODERATE 35: CPT

## 2019-12-13 RX ORDER — MAGNESIUM SULFATE 500 MG/ML
1 VIAL (ML) INJECTION ONCE
Refills: 0 | Status: COMPLETED | OUTPATIENT
Start: 2019-12-13 | End: 2019-12-13

## 2019-12-13 RX ORDER — ACETAZOLAMIDE 250 MG/1
250 TABLET ORAL EVERY 12 HOURS
Refills: 0 | Status: DISCONTINUED | OUTPATIENT
Start: 2019-12-13 | End: 2019-12-14

## 2019-12-13 RX ORDER — FUROSEMIDE 40 MG
20 TABLET ORAL ONCE
Refills: 0 | Status: COMPLETED | OUTPATIENT
Start: 2019-12-13 | End: 2019-12-13

## 2019-12-13 RX ORDER — POTASSIUM CHLORIDE 20 MEQ
20 PACKET (EA) ORAL
Refills: 0 | Status: COMPLETED | OUTPATIENT
Start: 2019-12-13 | End: 2019-12-13

## 2019-12-13 RX ORDER — MAGNESIUM SULFATE 500 MG/ML
2 VIAL (ML) INJECTION ONCE
Refills: 0 | Status: COMPLETED | OUTPATIENT
Start: 2019-12-13 | End: 2019-12-13

## 2019-12-13 RX ORDER — ELECTROLYTE SOLUTION,INJ
1 VIAL (ML) INTRAVENOUS
Refills: 0 | Status: DISCONTINUED | OUTPATIENT
Start: 2019-12-13 | End: 2019-12-13

## 2019-12-13 RX ORDER — POTASSIUM PHOSPHATE, MONOBASIC POTASSIUM PHOSPHATE, DIBASIC 236; 224 MG/ML; MG/ML
30 INJECTION, SOLUTION INTRAVENOUS ONCE
Refills: 0 | Status: COMPLETED | OUTPATIENT
Start: 2019-12-13 | End: 2019-12-13

## 2019-12-13 RX ORDER — POTASSIUM CHLORIDE 20 MEQ
10 PACKET (EA) ORAL
Refills: 0 | Status: COMPLETED | OUTPATIENT
Start: 2019-12-13 | End: 2019-12-13

## 2019-12-13 RX ORDER — CALCIUM GLUCONATE 100 MG/ML
2 VIAL (ML) INTRAVENOUS ONCE
Refills: 0 | Status: COMPLETED | OUTPATIENT
Start: 2019-12-13 | End: 2019-12-13

## 2019-12-13 RX ORDER — ACETAZOLAMIDE 250 MG/1
250 TABLET ORAL
Refills: 0 | Status: DISCONTINUED | OUTPATIENT
Start: 2019-12-13 | End: 2019-12-13

## 2019-12-13 RX ORDER — I.V. FAT EMULSION 20 G/100ML
10.4 EMULSION INTRAVENOUS
Qty: 25 | Refills: 0 | Status: DISCONTINUED | OUTPATIENT
Start: 2019-12-13 | End: 2019-12-14

## 2019-12-13 RX ADMIN — I.V. FAT EMULSION 10.4 ML/HR: 20 EMULSION INTRAVENOUS at 17:58

## 2019-12-13 RX ADMIN — POTASSIUM PHOSPHATE, MONOBASIC POTASSIUM PHOSPHATE, DIBASIC 83.33 MILLIMOLE(S): 236; 224 INJECTION, SOLUTION INTRAVENOUS at 06:09

## 2019-12-13 RX ADMIN — Medication 1 EACH: at 17:58

## 2019-12-13 RX ADMIN — PIPERACILLIN AND TAZOBACTAM 25 GRAM(S): 4; .5 INJECTION, POWDER, LYOPHILIZED, FOR SOLUTION INTRAVENOUS at 21:21

## 2019-12-13 RX ADMIN — Medication 5 MILLIGRAM(S): at 12:21

## 2019-12-13 RX ADMIN — SODIUM CHLORIDE 50 MILLILITER(S): 9 INJECTION, SOLUTION INTRAVENOUS at 06:09

## 2019-12-13 RX ADMIN — Medication 100 GRAM(S): at 20:04

## 2019-12-13 RX ADMIN — Medication 3 MILLILITER(S): at 12:34

## 2019-12-13 RX ADMIN — Medication 4 MILLILITER(S): at 12:34

## 2019-12-13 RX ADMIN — Medication 5 MILLIGRAM(S): at 23:41

## 2019-12-13 RX ADMIN — Medication 200 GRAM(S): at 21:21

## 2019-12-13 RX ADMIN — Medication 3 MILLILITER(S): at 17:21

## 2019-12-13 RX ADMIN — Medication 0.5 MILLIGRAM(S): at 17:21

## 2019-12-13 RX ADMIN — Medication 50 MILLIEQUIVALENT(S): at 06:09

## 2019-12-13 RX ADMIN — Medication 5 MILLIGRAM(S): at 08:40

## 2019-12-13 RX ADMIN — Medication 100 MILLIEQUIVALENT(S): at 17:59

## 2019-12-13 RX ADMIN — ENOXAPARIN SODIUM 40 MILLIGRAM(S): 100 INJECTION SUBCUTANEOUS at 12:21

## 2019-12-13 RX ADMIN — PIPERACILLIN AND TAZOBACTAM 25 GRAM(S): 4; .5 INJECTION, POWDER, LYOPHILIZED, FOR SOLUTION INTRAVENOUS at 05:17

## 2019-12-13 RX ADMIN — CHLORHEXIDINE GLUCONATE 1 APPLICATION(S): 213 SOLUTION TOPICAL at 05:17

## 2019-12-13 RX ADMIN — SODIUM CHLORIDE 50 MILLILITER(S): 9 INJECTION, SOLUTION INTRAVENOUS at 20:04

## 2019-12-13 RX ADMIN — Medication 4 MILLILITER(S): at 17:21

## 2019-12-13 RX ADMIN — Medication 100 MILLIEQUIVALENT(S): at 15:41

## 2019-12-13 RX ADMIN — Medication 5 MILLIGRAM(S): at 20:38

## 2019-12-13 RX ADMIN — Medication 100 MILLIEQUIVALENT(S): at 16:42

## 2019-12-13 RX ADMIN — Medication 20 MILLIGRAM(S): at 11:14

## 2019-12-13 RX ADMIN — Medication 5 MILLIGRAM(S): at 16:42

## 2019-12-13 RX ADMIN — PIPERACILLIN AND TAZOBACTAM 25 GRAM(S): 4; .5 INJECTION, POWDER, LYOPHILIZED, FOR SOLUTION INTRAVENOUS at 13:45

## 2019-12-13 RX ADMIN — Medication 3 MILLILITER(S): at 05:49

## 2019-12-13 RX ADMIN — ACETAZOLAMIDE 250 MILLIGRAM(S): 250 TABLET ORAL at 17:58

## 2019-12-13 RX ADMIN — Medication 50 MILLIEQUIVALENT(S): at 08:40

## 2019-12-13 RX ADMIN — Medication 4 MILLILITER(S): at 05:49

## 2019-12-13 RX ADMIN — Medication 50 GRAM(S): at 14:45

## 2019-12-13 RX ADMIN — Medication 0.5 MILLIGRAM(S): at 05:49

## 2019-12-13 RX ADMIN — SODIUM CHLORIDE 50 MILLILITER(S): 9 INJECTION, SOLUTION INTRAVENOUS at 21:21

## 2019-12-13 RX ADMIN — SODIUM CHLORIDE 50 MILLILITER(S): 9 INJECTION, SOLUTION INTRAVENOUS at 05:16

## 2019-12-13 RX ADMIN — Medication 5 MILLIGRAM(S): at 03:11

## 2019-12-13 NOTE — PROGRESS NOTE ADULT - ASSESSMENT
73 y/o M presenting with septic shock and high grade SBO s/p exploratory laparotomy, lysis of adhesions, decompression of bowel via enterotomy w/ primary repair, and Abthera VAC placement on 12/6; s/p take down of Abthera, washout and re-application of the Abthera vac on 12/8. Now s/p SBR and end ileostomy on 12/10.     PLAN:    Neuro: Patient extubated on 12/11, off sedation now.   - Pain control with IV Tylenol    Resp: acute respiratory failure upon initial presentation  - Trend ABG and lactate q12  - Duonebs    CV: septic shock requiring vasopressor support  - Off vasopressor  - Persistently tachycardic to 100~120's, metoprolol frequency increased  - Lactate 1.1    GI: SBO s/p exploratory laparotomy, lysis of adhesions, decompression of bowel via enterotomy w/ primary repair, and Abthera VAC placement. \  - Patient now s/p OR  for SBR with end ileostomy on 12/10. Abdomen closed.   - Monitor NGT output  - Monitor ostomy output  - Protonix for stress ulcer prophylaxis    Renal: CHI   - D5/LR @ 100   - Martinez, strict I&Os    Heme: no acute issues  - Lovenox for VTE prophylaxis  - Venodynes    ID: septic shock 2/2 intra abdominal infection  - Continue Zosyn    Endo: no acute issues  - Monitor glucose on BMP

## 2019-12-13 NOTE — PROGRESS NOTE ADULT - ATTENDING COMMENTS
Awake and alert  CXR b/l effusion congestion, will decrease IVF, one dose Lasix, replace K  Abd XRA has ileus, stoma not functioning, pt mal nourished, TPNB consult called  COPD meds  OOB mobilization

## 2019-12-13 NOTE — PROVIDER CONTACT NOTE (OTHER) - SITUATION
Patient sustaining tachycardia to 140s. Resting comfortably in bed. Normotensive. Denies chest pain, dizziness.

## 2019-12-13 NOTE — PROGRESS NOTE ADULT - SUBJECTIVE AND OBJECTIVE BOX
GENERAL SURGERY DAILY PROGRESS NOTE:    24 HOUR EVENTS:  - discontinued rosas, passed trial of void  - diuresed w/ 20 of lasix  - pt pulled out NGT in the AM, reinserted in PM w/ immediate output of about 600cc  - persistently tachycardic to 100~110's, metoprolol increased from 5mg q8 to q6    Subjective:  Patient seen and examined this am.   Denies pain  Counselled NGT     OBJECTIVE:    ICU Vital Signs Last 24 Hrs  T(C): 36.7 (13 Dec 2019 07:00), Max: 37.7 (13 Dec 2019 03:00)  T(F): 98.1 (13 Dec 2019 07:00), Max: 99.9 (13 Dec 2019 03:00)  HR: 84 (13 Dec 2019 09:00) (81 - 135)  BP: 131/63 (13 Dec 2019 09:00) (96/54 - 153/78)  BP(mean): 90 (13 Dec 2019 09:00) (71 - 110)  RR: 33 (13 Dec 2019 09:00) (14 - 35)  SpO2: 100% (13 Dec 2019 09:00) (77% - 100%)    Exam:  Gen: NAD, sitting in chair  Resp: Mask  Abd: NGT. Soft, ND. Markedly decreased tenderness. VAC in place. No erythema/induration. Dressing changed Ostomy  : Rosas clear yellow  Ext: No edema, WWP wrist restraints ICDs    LABS:                         10.3   15.99 )-----------( 147      ( 13 Dec 2019 01:44 )             31.6     12-13    145  |  108  |  6<L>  ----------------------------<  116<H>  3.6   |  30  |  0.56    Ca    7.6<L>      13 Dec 2019 01:44  Phos  1.9     12-13  Mg     2.0     12-13    TPro  4.7<L>  /  Alb  2.1<L>  /  TBili  0.6  /  DBili  0.2  /  AST  30  /  ALT  30  /  AlkPhos  48  12-13

## 2019-12-13 NOTE — PROGRESS NOTE ADULT - ASSESSMENT
74M s/p ex lap, MIRYAM, closure of enterotomy, abthera vac placement 12/8 early morning. RTOR for exlap/washout 12/9. s/p RTOR ex lap, resection of 150cm bowel, creation of colostomy.     Plan:  - Diet: NPO  - Labs: f/u AM labs  - c/w zosyn  - Pain medication  - DVT ppx: Lovenox  - Activity: OOB  - supportive care per SICU  - TPN per SICU  - Monitor NGT and ostomy output    Red Team Surgery  [id number]

## 2019-12-13 NOTE — CHART NOTE - NSCHARTNOTEFT_GEN_A_CORE
Nutrition Follow Up Note    Patient seen for: TPN Team consult     Source: medical record, TPN Team rounds    Chart reviewed, events noted. "73 y/o M presenting with septic shock and high grade SBO s/p exploratory laparotomy, lysis of adhesions, decompression of bowel via enterotomy w/ primary repair, and Abthera VAC placement on ; s/p take down of Abthera, washout and re-application of the Abthera vac on . Now s/p SBR and end ileostomy on 12/10."    Nutrition Status: Severe Malnutrition. Pt NPO > 7 days S/P GI surgery x 3; 150 cm small bowel remaining. Pt extubated , with episode of emesis . NGT self-removed then replaced on . TPN Team consulted ; PICC placement ordered.    Recommend 1/2 dose TPN on day 1; monitor labs/lytes and advance as tolerated. Trend BG; monitor need for insulin in TPN. Recommend continue D5 until TPN is initiated.    TPN recommendations as appropriate:   120 grams amino acids (800ml 15% a.a.)  210 grams dextrose (300ml 70% dex)  50 grams SMOFlipid (250ml 20%IVFE @ 20.8ml/hr x 12 hours)    Total Calories: 1694 gregorio/day (31 gregorio/Kg per dosing wt 54.2Kg)  Total Protein: 120 Gm/day (2.2 Gm/Kg per dosing wt 54.2Kg)  Micronutrients: 10ml MVI, 3ml MTE-5    Non-Protein Calories: 1214 gregorio/day (22 gregorio/Kg)  Dextrose Infusion Rate: 2.7 mg/Kg/min  Lipid Infusion Rate: 0.92 Gm/Kg/day; 0.08 Gm/Kg/hr     Diet : NPO    NGT output x 24-hours: 1150ml    Last emesis:  (200ml)    Ileostomy output x 24-hours: 350ml    Daily Weight in k.2 (), Weight in k.4 (), Weight in k.6 (), Weight in k.3 (12-10), Weight in k.2 (), Weight in k.1 (), Weight in k.5 (12-08)    IBW 72.7Kg    Drug Dosing Weight  Weight (kg): 54.2 (06 Dec 2019 22:12)  BMI (kg/m2): 17.6 (09 Dec 2019 09:00)    Pertinent Medications: MEDICATIONS  (STANDING):  acetylcysteine 20%  Inhalation 4 milliLiter(s) Inhalation every 6 hours  albuterol/ipratropium for Nebulization 3 milliLiter(s) Nebulizer every 6 hours  buDESOnide    Inhalation Suspension 0.5 milliGRAM(s) Inhalation every 12 hours  chlorhexidine 2% Cloths 1 Application(s) Topical <User Schedule>  dextrose 5% + lactated ringers. 1000 milliLiter(s) (50 mL/Hr) IV Continuous <Continuous>  enoxaparin Injectable 40 milliGRAM(s) SubCutaneous daily  metoprolol tartrate Injectable 5 milliGRAM(s) IV Push every 4 hours  piperacillin/tazobactam IVPB.. 3.375 Gram(s) IV Intermittent every 8 hours    MEDICATIONS  (PRN):  acetaminophen  IVPB .. 1000 milliGRAM(s) IV Intermittent every 6 hours PRN Moderate Pain (4 - 6)    LABS:    @ 07:46:  Prealbumin 5<L>,   @ 01:44: Sodium 145, Potassium 3.6, Chloride 108, Calcium 7.6<L>, Magnesium 2.0, Phosphorus 1.9<L>, BUN 6<L>, Creatinine 0.56, <H>, Alk Phos 48, ALT/SGPT 30, AST/SGOT 30, Total Protein 4.7<L>, Albumin 2.1<L>,  Total Bilirubin 0.6, Direct Bilirubin 0.2, Hemoglobin 10.3<L>, Hematocrit 31.6<L>   @ 12:41: Sodium 147<H>, Potassium 3.4<L>, Chloride 107, Calcium 7.7<L>, Magnesium 2.3, Phosphorus 3.2, BUN 10, Creatinine 0.60, <H>, Alk Phos 50, ALT/SGPT 33, AST/SGOT 44<H>, Total Protein 5.2<L>, Albumin 2.6<L>, Total Bilirubin 0.7, Direct Bilirubin 0.3<H>, Hemoglobin 11.0<L>, Hematocrit 34.8<L>    Triglycerides, Serum: 93 mg/dL (12-13-19 @ 07:45)    Skin per nursing documentation: no pressure injuries noted  Edema: 2+ generalized    Estimated Needs: based on dosing wt 54.2Kg, with consideration for TPN, malnutrition  4472-9198 gregorio/day (25-30cal/Kg)   Gm protein/day (1.8-2.2Gm/Kg)     Previous Nutrition Diagnosis: Severe malnutrition  Nutrition Diagnosis is: ongoing, being addressed with TPN pending PICC placement    New Nutrition Diagnosis: none     Interventions: initiate TPN as medically feasible    Recommend  1) TPN per TPN Team/Nutrition assessment  2) Monitor GI function, ability to advance diet    Monitoring and Evaluation:     Continue to monitor nutritional intake, tolerance to diet prescription, weights, labs, skin integrity.    RD remains available upon request and will follow up per protocol.    Sowmya Graham MS RD CDN [facility], Pager # [phone]. Nutrition Follow Up Note    Patient seen for: TPN Team consult     Source: medical record, TPN Team rounds    Chart reviewed, events noted. "73 y/o M presenting with septic shock and high grade SBO s/p exploratory laparotomy, lysis of adhesions, decompression of bowel via enterotomy w/ primary repair, and Abthera VAC placement on ; s/p take down of Abthera, washout and re-application of the Abthera vac on . Now s/p SBR and end ileostomy on 12/10."    Nutrition Status: Severe Malnutrition. Pt NPO > 7 days S/P GI surgery x 3; 150 cm small bowel remaining. Pt extubated , with episode of emesis . NGT self-removed then replaced on . TPN Team consulted ; PICC placement ordered.    Recommend 1/2 dose TPN on day 1; monitor labs/lytes and advance as tolerated. Trend BG; monitor need for insulin in TPN. Recommend continue D5 until TPN is initiated. Pt at high risk for refeeding syndrome; monitor potassium, magnesium, phosphorus, glucose and fluid balance closely. Replete lytes as appropriate prior to initiating/advancing tube feeds.     TPN recommendations as appropriate:   120 grams amino acids (800ml 15% a.a.)  210 grams dextrose (300ml 70% dex)  50 grams SMOFlipid (250ml 20%IVFE @ 20.8ml/hr x 12 hours)    Total Calories: 1694 gregorio/day (31 gregorio/Kg per dosing wt 54.2Kg)  Total Protein: 120 Gm/day (2.2 Gm/Kg per dosing wt 54.2Kg)  Micronutrients: 10ml MVI, 3ml MTE-5    Non-Protein Calories: 1214 gregorio/day (22 gregorio/Kg)  Dextrose Infusion Rate: 2.7 mg/Kg/min  Lipid Infusion Rate: 0.92 Gm/Kg/day; 0.08 Gm/Kg/hr     Diet : NPO    NGT output x 24-hours: 1150ml    Last emesis:  (200ml)    Ileostomy output x 24-hours: 350ml    Daily Weight in k.2 (-), Weight in k.4 (), Weight in k.6 (-), Weight in k.3 (12-10), Weight in k.2 (-), Weight in k.1 (), Weight in k.5 ()    IBW 72.7Kg    Drug Dosing Weight  Weight (kg): 54.2 (06 Dec 2019 22:12)  BMI (kg/m2): 17.6 (09 Dec 2019 09:00)    Pertinent Medications: MEDICATIONS  (STANDING):  acetylcysteine 20%  Inhalation 4 milliLiter(s) Inhalation every 6 hours  albuterol/ipratropium for Nebulization 3 milliLiter(s) Nebulizer every 6 hours  buDESOnide    Inhalation Suspension 0.5 milliGRAM(s) Inhalation every 12 hours  chlorhexidine 2% Cloths 1 Application(s) Topical <User Schedule>  dextrose 5% + lactated ringers. 1000 milliLiter(s) (50 mL/Hr) IV Continuous <Continuous>  enoxaparin Injectable 40 milliGRAM(s) SubCutaneous daily  metoprolol tartrate Injectable 5 milliGRAM(s) IV Push every 4 hours  piperacillin/tazobactam IVPB.. 3.375 Gram(s) IV Intermittent every 8 hours    MEDICATIONS  (PRN):  acetaminophen  IVPB .. 1000 milliGRAM(s) IV Intermittent every 6 hours PRN Moderate Pain (4 - 6)    LABS:    @ 07:46:  Prealbumin 5<L>,   @ 01:44: Sodium 145, Potassium 3.6, Chloride 108, Calcium 7.6<L>, Magnesium 2.0, Phosphorus 1.9<L>, BUN 6<L>, Creatinine 0.56, <H>, Alk Phos 48, ALT/SGPT 30, AST/SGOT 30, Total Protein 4.7<L>, Albumin 2.1<L>,  Total Bilirubin 0.6, Direct Bilirubin 0.2, Hemoglobin 10.3<L>, Hematocrit 31.6<L>   @ 12:41: Sodium 147<H>, Potassium 3.4<L>, Chloride 107, Calcium 7.7<L>, Magnesium 2.3, Phosphorus 3.2, BUN 10, Creatinine 0.60, <H>, Alk Phos 50, ALT/SGPT 33, AST/SGOT 44<H>, Total Protein 5.2<L>, Albumin 2.6<L>, Total Bilirubin 0.7, Direct Bilirubin 0.3<H>, Hemoglobin 11.0<L>, Hematocrit 34.8<L>    Triglycerides, Serum: 93 mg/dL (12-13-19 @ 07:45)    Skin per nursing documentation: no pressure injuries noted  Edema: 2+ generalized    Estimated Needs: based on dosing wt 54.2Kg, with consideration for TPN, malnutrition  9993-4689 gregorio/day (25-30cal/Kg)   Gm protein/day (1.8-2.2Gm/Kg)     Previous Nutrition Diagnosis: Severe malnutrition  Nutrition Diagnosis is: ongoing, being addressed with TPN pending PICC placement    New Nutrition Diagnosis: none     Interventions: initiate TPN as medically feasible    Recommend  1) TPN per TPN Team/Nutrition assessment  2) Monitor GI function, ability to advance diet    Monitoring and Evaluation:     Continue to monitor nutritional intake, tolerance to diet prescription, weights, labs, skin integrity.    RD remains available upon request and will follow up per protocol.    Sowmya Graham MS RD CDN [facility], Pager # [phone].

## 2019-12-13 NOTE — CONSULT NOTE ADULT - SUBJECTIVE AND OBJECTIVE BOX
NUTRITION SUPPORT / TPN CONSULT NOTE    HPI:  73 y/o with PMhx of COPD and ETOH abuse presenting with abdominal pain x2 days. Reports that pain felt more like gas pain, and had similar episodes in the past. Felt nauseous and couldn't vomit. Patient forced himself to vomit had hematemesis. Unable to tolerate PO in last day. Reports unable to pass gas or have BM in 3 days. Patient reports that he does not follow up with a PMD regularly, and can't recall last time he saw doctor. Reports he had colonoscopy recently with no significant findings. Last drink was 3 years ago. Reports some difficulty breathing. Denies fevers, chills, chest pain, or urinary changes. (06 Dec 2019 18:27)    74 year old male w/ PMH of COPD and EtOH dependence presented to ER w/ abdominal pain, nausea, hematemesis, and poor PO intake for ~2-3 days.  Labs significant for an CHI w/ Cr 2.74 and lactate of 9.4. He was also notably tachycardic to the 110s and hypotensive w/ SBP in the 70s. He was given 2 units of PRBCs and 2 L of LR in the ED due to concern for upper GI bleeding. Imaging revealed high grade SBO in the RLQ. Patient was taken to the OR emergently on 12/6 for an exploratory laparotomy, lysis of adhesions, decompression of bowel via enterotomy w/ primary repair, and Abthera VAC placement. Of note, the distal 50% of the bowel appeared dusky but viable. He required vasopressor support with phenylephrine and vasopressin infusions. He received 3000 mL of crystalloid w/ EBL of 10 mL and UOP of 25 mL. Patient was left intubated at the end of the case so SICU was consulted for hemodynamic monitoring.  Pt was taken back to the OR on 12/8 and underwent take down of Abthera, washout and re-application of the Abthera vac.  Pt doing well in SICU. Extubation was held off as plan to RTOR on 12/10.   On 12/10 Pt was taken again to OR for 2nd look/washout/possible definitive procedure.  Pt underwent a Exploratory laparotomy, Abdominal washout, Rt partial Colectomy, with mucous fistula and end ileostomy creation.  In the OR they resected  about 150cm of small bowel, with 150cm of small bowel remaining, all pink/healthy/peristalsing. They noted patchy areas of ischemia on cecum, so proximal ascending colon resected and created an  End ileostomy and mucous fistula matured through one aperture.   Post op pt was extubated on 12/11.  Pt noted to be acidotic after extubation, placed on Bipap 12/5 initially without much improvement, increased to 15/5, pt then started on Duonebs and Pulmicort.   Pt was tachycardic & found to be in SVT on EKG- controlled with lopressor push x1.       N/V/D,  BM/ Flatus,   NGT,     Papl/ sob/dyspnea/ cp,       F/C/S  TPN consult requested to assist w/ management of pt's nutrition while being NPO for a prolonged period of time.  All questions asked and answered to pt's and family's satisfaction.    Current Diet: Diet, NPO (12-11-19 @ 00:11)    Appetite: [ x ]Poor [  ]Adequate [  ]Good  Caloric intake:  [   ]  Adequate   [ x  ] Inadequate      MEDICATIONS  (STANDING):  acetylcysteine 20%  Inhalation 4 milliLiter(s) Inhalation every 6 hours  albuterol/ipratropium for Nebulization 3 milliLiter(s) Nebulizer every 6 hours  buDESOnide    Inhalation Suspension 0.5 milliGRAM(s) Inhalation every 12 hours  chlorhexidine 2% Cloths 1 Application(s) Topical <User Schedule>  dextrose 5% + lactated ringers. 1000 milliLiter(s) (50 mL/Hr) IV Continuous <Continuous>  enoxaparin Injectable 40 milliGRAM(s) SubCutaneous daily  metoprolol tartrate Injectable 5 milliGRAM(s) IV Push every 4 hours  piperacillin/tazobactam IVPB.. 3.375 Gram(s) IV Intermittent every 8 hours  potassium chloride  20 mEq/100 mL IVPB 20 milliEquivalent(s) IV Intermittent every 2 hours    MEDICATIONS  (PRN):  acetaminophen  IVPB .. 1000 milliGRAM(s) IV Intermittent every 6 hours PRN Moderate Pain (4 - 6)      PAST MEDICAL & SURGICAL HISTORY:  COPD with hypoxia  ETOH abuse  s/p lumbosacral spine surgery  s/p prostate surgery  s/p appendectomy      FAMILY HISTORY:      Social History:  single/ / / / lives alone/ SNF/ assisted living; Denies current smoking, ETOH, drugs    ALLERGIES  No Known Allergies      REVIEW OF SYSTEMS  --------------------------------------------------------------------------------  General/ GI see HPI  all other systems negative    VITALS  T(C): 37.7 (12-13-19 @ 03:00), Max: 37.7 (12-13-19 @ 03:00)  HR: 134 (12-13-19 @ 07:00) (81 - 135)  BP: 127/62 (12-13-19 @ 07:00) (96/54 - 153/78)  RR: 27 (12-13-19 @ 07:00) (14 - 35)  SpO2: 90% (12-13-19 @ 07:00) (77% - 100%)  I&O's Detail    12 Dec 2019 07:01  -  13 Dec 2019 07:00  --------------------------------------------------------  IN:    dextrose 5% + lactated ringers.: 1210 mL    IV PiggyBack: 695.8 mL    Solution: 475 mL  Total IN: 2380.8 mL    OUT:    Bulb: 570 mL    Emesis: 200 mL    Ileostomy: 350 mL    Indwelling Catheter - Urethral: 1260 mL    Nasoenteral Tube: 1150 mL    Voided: 700 mL  Total OUT: 4230 mL    Total NET: -1849.2 mL            PHYSICAL EXAM  --------------------------------------------------------------------------------  	Gen: guarded but stable,   	HEENT: NC/AT, PERRL, supple neck, clear oropharynx, mucosa dry  	Pulm: decreased BS B/L  	CV: RRR, S1S2; no rub  	GI:  softly distended, appropriately tender, VAC              MSK: decreased FROM, no contractures nor deformities  	Vascular: Equally Warm, no clubbing, cyanosis, nor edema  	Neuro: No focal deficits, intact sensation, weakened strength  	Psych: Normal affect and mood  	Skin: Warm, without rashes, good turgor  	      LABS:                        10.3   15.99 )-----------( 147      ( 13 Dec 2019 01:44 )             31.6     12-13    145  |  108  |  6<L>  ----------------------------<  116<H>  3.6   |  30  |  0.56    Ca    7.6<L>      13 Dec 2019 01:44  Phos  1.9     12-13  Mg     2.0     12-13    TPro  4.7<L>  /  Alb  2.1<L>  /  TBili  0.6  /  DBili  0.2  /  AST  30  /  ALT  30  /  AlkPhos  48  12-13    Ionized Calcium Levels:   Blood Gas Arterial - Calcium, Ionized: 1.20 mmoL/L (12.12.19 @ 01:27)    PT/INR - ( 13 Dec 2019 01:44 )   PT: 13.1 sec;   INR: 1.13 ratio    PTT - ( 13 Dec 2019 01:44 )  PTT:30.0 sec    ABG - ( 12 Dec 2019 01:27 )  pH, Arterial: 7.39  pH, Blood: x     /  pCO2: 46    /  pO2: 138   / HCO3: 27    / Base Excess: 2.1   /  SaO2: 99                CULTURES:        RADIOLOGY:    < from: Xray Chest 1 View- PORTABLE-Urgent (12.12.19 @ 05:29) >    Impression:    Suboptimal study. The left costophrenic angle is not included in this   study. Right pleural effusion. Bibasilar pneumonia cannot be ruled out   entirely. NG tube is in the stomach. A central line is seen on the right   and the tip is in the superior vena cava. No pneumothorax.      < from: CT Abdomen and Pelvis No Cont (12.06.19 @ 16:48) >  FINDINGS:    LOWER CHEST: Within normal limits.    LIVER: Within normal limits.  BILE DUCTS: Normal caliber.  GALLBLADDER: Within normal limits.  SPLEEN: Within normal limits.  PANCREAS: Within normal limits.  ADRENALS: Within normal limits.  KIDNEYS/URETERS: No hydronephrosis. Nonobstructing bilateral renal   calculi.    BLADDER: Within normal limits.  REPRODUCTIVE ORGANS: Prostatectomy.    BOWEL: High-grade small bowel obstruction with transition point in the   right lower abdomen (series 602, image 34).  PERITONEUM: No ascites.  VESSELS: Atherosclerotic changes.  RETROPERITONEUM/LYMPH NODES: No lymphadenopathy.  ABDOMINAL WALL: Within normal limits.  BONES: Degenerative changes.    IMPRESSION:     High-grade small bowel obstruction with transition point in the right   lower quadrant. NUTRITION SUPPORT / TPN CONSULT NOTE    HPI:  74 year old male w/ PMH of COPD and EtOH dependence with PSH/o appy, prostate surgery, & spine surgery presented to ER w/ abdominal pain, nausea, hematemesis, and poor PO intake for ~2-3 days.  Labs significant for an CHI w/ Cr 2.74 and lactate of 9.4. He was also notably tachycardic to the 110s and hypotensive w/ SBP in the 70s. He was given 2 units of PRBCs and 2 L of LR in the ED due to concern for upper GI bleeding. Imaging revealed high grade SBO in the RLQ. Patient was taken to the OR emergently on 12/6 for an exploratory laparotomy, lysis of adhesions, decompression of bowel via enterotomy w/ primary repair, and Abthera VAC placement. Of note, the distal 50% of the bowel appeared dusky but viable. He required vasopressor support with phenylephrine and vasopressin infusions. He received 3000 mL of crystalloid w/ EBL of 10 mL and UOP of 25 mL. Patient was left intubated at the end of the case so SICU was consulted for hemodynamic monitoring.  Pt was taken back to the OR on 12/8 and underwent take down of Abthera, washout and re-application of the Abthera vac.  Pt doing well in SICU. Extubation was held off as plan to RTOR on 12/10.   On 12/10 Pt was taken again to OR for 2nd look/washout/possible definitive procedure.  Pt underwent a Exploratory laparotomy, Abdominal washout, Rt partial Colectomy, with mucous fistula and end ileostomy creation.  In the OR they resected  about 150cm of small bowel, with 150cm of small bowel remaining, all pink/healthy/peristalsing. They noted patchy areas of ischemia on cecum, so proximal ascending colon resected and created an  End ileostomy and mucous fistula matured through one aperture.   Post op pt was extubated on 12/11.  Pt noted to be acidotic after extubation, placed on Bipap 12/5 initially without much improvement, increased to 15/5, pt then started on Duonebs and Pulmicort.   Pt was tachycardic & found to be in SVT on EKG- controlled with lopressor push x1.  Pt's ostomy started to function and decreased NGt outpt.   Pt then vomited bilious material.  NGT was replaced and 600cc came out immediately.  Pt has been NPO/IVF w/ NGT since then.  Afebrile, No f/c/s.  No cp/palp/sob/ dyspnea/ cough noted.  TPN consult requested to assist w/ management of pt's nutrition while being NPO for a prolonged period of time.  Pt was doing well s/p All questions asked and answered to pt's satisfaction.    Current Diet: Diet, NPO (12-11-19 @ 00:11)    Appetite: [ x ]Poor [  ]Adequate [  ]Good  Caloric intake:  [   ]  Adequate   [ x  ] Inadequate      MEDICATIONS  (STANDING):  acetylcysteine 20%  Inhalation 4 milliLiter(s) Inhalation every 6 hours  albuterol/ipratropium for Nebulization 3 milliLiter(s) Nebulizer every 6 hours  buDESOnide    Inhalation Suspension 0.5 milliGRAM(s) Inhalation every 12 hours  chlorhexidine 2% Cloths 1 Application(s) Topical <User Schedule>  dextrose 5% + lactated ringers. 1000 milliLiter(s) (50 mL/Hr) IV Continuous <Continuous>  enoxaparin Injectable 40 milliGRAM(s) SubCutaneous daily  metoprolol tartrate Injectable 5 milliGRAM(s) IV Push every 4 hours  piperacillin/tazobactam IVPB.. 3.375 Gram(s) IV Intermittent every 8 hours  potassium chloride  20 mEq/100 mL IVPB 20 milliEquivalent(s) IV Intermittent every 2 hours    MEDICATIONS  (PRN):  acetaminophen  IVPB .. 1000 milliGRAM(s) IV Intermittent every 6 hours PRN Moderate Pain (4 - 6)      PAST MEDICAL & SURGICAL HISTORY:  COPD with hypoxia  ETOH abuse  s/p lumbosacral spine surgery  s/p prostate surgery  s/p appendectomy      FAMILY HISTORY:  no significant      Social History: ; (+)smoking  Denies current ETOH abuse (last drink 3yr ago, Denies drugs      No Known Allergies      REVIEW OF SYSTEMS  --------------------------------------------------------------------------------  General/ GI see HPI  all other systems negative    VITALS  T(C): 37.7 (12-13-19 @ 03:00), Max: 37.7 (12-13-19 @ 03:00)  HR: 134 (12-13-19 @ 07:00) (81 - 135)  BP: 127/62 (12-13-19 @ 07:00) (96/54 - 153/78)  RR: 27 (12-13-19 @ 07:00) (14 - 35)  SpO2: 90% (12-13-19 @ 07:00) (77% - 100%)  I&O's Detail    12 Dec 2019 07:01  -  13 Dec 2019 07:00  --------------------------------------------------------  IN:    dextrose 5% + lactated ringers.: 1210 mL    IV PiggyBack: 695.8 mL    Solution: 475 mL  Total IN: 2380.8 mL    OUT:    Bulb: 570 mL    Emesis: 200 mL    Ileostomy: 350 mL    Indwelling Catheter - Urethral: 1260 mL    Nasoenteral Tube: 1150 mL    Voided: 700 mL  Total OUT: 4230 mL    Total NET: -1849.2 mL            PHYSICAL EXAM  --------------------------------------------------------------------------------  	Gen: guarded but stable,   	HEENT: NC/AT, PERRL, supple neck, clear oropharynx, mucosa dry  	Pulm: decreased BS B/L  	CV: RRR, S1S2; no rub  	GI:  softly distended, appropriately tender, VAC midline incision w/ good seal                        (+)ostomy pink viable- serous/bilious drainage                        LLQ JOVANNA w/ serosanguinous drainage              MSK: FROM, no contractures nor deformities  	Vascular: Equally Warm, no clubbing, cyanosis, nor edema  	Neuro: No focal deficits, intact sensation, weakened strength  	Psych: Normal affect and mood  	Skin: Warm, without rashes, good turgor  	    LABS:                        10.3   15.99 )-----------( 147      ( 13 Dec 2019 01:44 )             31.6     12-13    145  |  108  |  6<L>  ----------------------------<  116<H>  3.6   |  30  |  0.56    Ca    7.6<L>      13 Dec 2019 01:44  Phos  1.9     12-13  Mg     2.0     12-13    TPro  4.7<L>  /  Alb  2.1<L>  /  TBili  0.6  /  DBili  0.2  /  AST  30  /  ALT  30  /  AlkPhos  48  12-13    Ionized Calcium Levels:   Blood Gas Arterial - Calcium, Ionized: 1.20 mmoL/L (12.12.19 @ 01:27)    PT/INR - ( 13 Dec 2019 01:44 )   PT: 13.1 sec;   INR: 1.13 ratio    PTT - ( 13 Dec 2019 01:44 )  PTT:30.0 sec    ABG - ( 12 Dec 2019 01:27 )  pH, Arterial: 7.39  pH, Blood: x     /  pCO2: 46    /  pO2: 138   / HCO3: 27    / Base Excess: 2.1   /  SaO2: 99            CULTURES:        RADIOLOGY:    < from: Xray Chest 1 View- PORTABLE-Urgent (12.12.19 @ 05:29) >    Impression:    Suboptimal study. The left costophrenic angle is not included in this   study. Right pleural effusion. Bibasilar pneumonia cannot be ruled out   entirely. NG tube is in the stomach. A central line is seen on the right   and the tip is in the superior vena cava. No pneumothorax.      < from: CT Abdomen and Pelvis No Cont (12.06.19 @ 16:48) >  FINDINGS:    LOWER CHEST: Within normal limits.    LIVER: Within normal limits.  BILE DUCTS: Normal caliber.  GALLBLADDER: Within normal limits.  SPLEEN: Within normal limits.  PANCREAS: Within normal limits.  ADRENALS: Within normal limits.  KIDNEYS/URETERS: No hydronephrosis. Nonobstructing bilateral renal   calculi.    BLADDER: Within normal limits.  REPRODUCTIVE ORGANS: Prostatectomy.    BOWEL: High-grade small bowel obstruction with transition point in the   right lower abdomen (series 602, image 34).  PERITONEUM: No ascites.  VESSELS: Atherosclerotic changes.  RETROPERITONEUM/LYMPH NODES: No lymphadenopathy.  ABDOMINAL WALL: Within normal limits.  BONES: Degenerative changes.    IMPRESSION:     High-grade small bowel obstruction with transition point in the right   lower quadrant. NUTRITION SUPPORT / TPN CONSULT NOTE    HPI:  74 year old male w/ PMH of COPD and EtOH dependence with PSH/o appy, prostate surgery, & spine surgery presented to ER w/ abdominal pain, nausea, hematemesis, and poor PO intake for ~2-3 days.  Labs significant for an CHI w/ Cr 2.74 and lactate of 9.4. He was also notably tachycardic to the 110s and hypotensive w/ SBP in the 70s. He was given 2 units of PRBCs and 2 L of LR in the ED due to concern for upper GI bleeding. Imaging revealed high grade SBO in the RLQ. Patient was taken to the OR emergently on 12/6 for an exploratory laparotomy, lysis of adhesions, decompression of bowel via enterotomy w/ primary repair, and Abthera VAC placement. Of note, the distal 50% of the bowel appeared dusky but viable. He required vasopressor support with phenylephrine and vasopressin infusions. He received 3000 mL of crystalloid w/ EBL of 10 mL and UOP of 25 mL. Patient was left intubated at the end of the case so SICU was consulted for hemodynamic monitoring.  Pt was taken back to the OR on 12/8 and underwent take down of Abthera, washout and re-application of the Abthera vac.  Pt doing well in SICU. Extubation was held off as plan to RTOR on 12/10.   On 12/10 Pt was taken again to OR for 2nd look/washout/possible definitive procedure.  Pt underwent a Exploratory laparotomy, Abdominal washout, Rt partial Colectomy, with mucous fistula and end ileostomy creation.  In the OR they resected  about 150cm of small bowel, with 150cm of small bowel remaining, all pink/healthy/peristalsing. They noted patchy areas of ischemia on cecum, so proximal ascending colon resected and created an  End ileostomy and mucous fistula matured through one aperture.   Post op pt was extubated on 12/11.  Pt noted to be acidotic after extubation, placed on Bipap 12/5 initially without much improvement, increased to 15/5, pt then started on Duonebs and Pulmicort.   Pt was tachycardic & found to be in SVT on EKG- controlled with lopressor push x1.  Pt's ostomy started to function and decreased NGt outpt.   Pt then vomited bilious material.  NGT was replaced and 600cc came out immediately.  Pt has been NPO/IVF w/ NGT since then.  Afebrile, No f/c/s.  No cp/palp/sob/ dyspnea/ cough noted.  TPN consult requested to assist w/ management of pt's nutrition while being NPO for a prolonged period of time.  Pt was doing well s/p All questions asked and answered to pt's satisfaction.    Current Diet: Diet, NPO (12-11-19 @ 00:11)    Appetite: [ x ]Poor [  ]Adequate [  ]Good  Caloric intake:  [   ]  Adequate   [ x  ] Inadequate      MEDICATIONS  (STANDING):  acetylcysteine 20%  Inhalation 4 milliLiter(s) Inhalation every 6 hours  albuterol/ipratropium for Nebulization 3 milliLiter(s) Nebulizer every 6 hours  buDESOnide    Inhalation Suspension 0.5 milliGRAM(s) Inhalation every 12 hours  chlorhexidine 2% Cloths 1 Application(s) Topical <User Schedule>  dextrose 5% + lactated ringers. 1000 milliLiter(s) (50 mL/Hr) IV Continuous <Continuous>  enoxaparin Injectable 40 milliGRAM(s) SubCutaneous daily  metoprolol tartrate Injectable 5 milliGRAM(s) IV Push every 4 hours  piperacillin/tazobactam IVPB.. 3.375 Gram(s) IV Intermittent every 8 hours  potassium chloride  20 mEq/100 mL IVPB 20 milliEquivalent(s) IV Intermittent every 2 hours    MEDICATIONS  (PRN):  acetaminophen  IVPB .. 1000 milliGRAM(s) IV Intermittent every 6 hours PRN Moderate Pain (4 - 6)      PAST MEDICAL & SURGICAL HISTORY:  COPD with hypoxia  ETOH abuse  s/p lumbosacral spine surgery  s/p prostate surgery  s/p appendectomy      FAMILY HISTORY:  no significant      Social History: ; (+)smoking  Denies current ETOH abuse (last drink 3yr ago, Denies drugs      No Known Allergies      REVIEW OF SYSTEMS  --------------------------------------------------------------------------------  General/ GI see HPI  all other systems negative    VITALS  T(C): 37.7 (12-13-19 @ 03:00), Max: 37.7 (12-13-19 @ 03:00)  HR: 134 (12-13-19 @ 07:00) (81 - 135)  BP: 127/62 (12-13-19 @ 07:00) (96/54 - 153/78)  RR: 27 (12-13-19 @ 07:00) (14 - 35)  SpO2: 90% (12-13-19 @ 07:00) (77% - 100%)  I&O's Detail    12 Dec 2019 07:01  -  13 Dec 2019 07:00  --------------------------------------------------------  IN:    dextrose 5% + lactated ringers.: 1210 mL    IV PiggyBack: 695.8 mL    Solution: 475 mL  Total IN: 2380.8 mL    OUT:    Bulb: 570 mL    Emesis: 200 mL    Ileostomy: 350 mL    Indwelling Catheter - Urethral: 1260 mL    Nasoenteral Tube: 1150 mL    Voided: 700 mL  Total OUT: 4230 mL    Total NET: -1849.2 mL            PHYSICAL EXAM  --------------------------------------------------------------------------------  	Gen: guarded but stable, A&Ox3, NC O2 (+)NGT  	HEENT: NC/AT, PERRL, supple neck, clear oropharynx, mucosa dry  	Pulm: decreased BS B/L  	CV: RRR, S1S2; no rub  	GI:  softly distended, appropriately tender, VAC midline incision w/ good seal                        (+)ostomy pink viable- serous/bilious drainage                        LLQ JOVANNA w/ serosanguinous drainage              MSK: FROM, no contractures nor deformities  	Vascular: Equally Warm, no clubbing, cyanosis, nor edema                        Rt IJ TLC dressing c/d/I   	Neuro: No focal deficits, intact sensation, weakened strength  	Psych: Normal affect and mood  	Skin: Warm, without rashes, good turgor  	    LABS:                        10.3   15.99 )-----------( 147      ( 13 Dec 2019 01:44 )             31.6     12-13    145  |  108  |  6<L>  ----------------------------<  116<H>  3.6   |  30  |  0.56    Ca    7.6<L>      13 Dec 2019 01:44  Phos  1.9     12-13  Mg     2.0     12-13    TPro  4.7<L>  /  Alb  2.1<L>  /  TBili  0.6  /  DBili  0.2  /  AST  30  /  ALT  30  /  AlkPhos  48  12-13    Ionized Calcium Levels:   Blood Gas Arterial - Calcium, Ionized: 1.20 mmoL/L (12.12.19 @ 01:27)    PT/INR - ( 13 Dec 2019 01:44 )   PT: 13.1 sec;   INR: 1.13 ratio    PTT - ( 13 Dec 2019 01:44 )  PTT:30.0 sec    ABG - ( 12 Dec 2019 01:27 )  pH, Arterial: 7.39  pH, Blood: x     /  pCO2: 46    /  pO2: 138   / HCO3: 27    / Base Excess: 2.1   /  SaO2: 99            CULTURES:        RADIOLOGY:    < from: Xray Chest 1 View- PORTABLE-Urgent (12.12.19 @ 05:29) >    Impression:    Suboptimal study. The left costophrenic angle is not included in this   study. Right pleural effusion. Bibasilar pneumonia cannot be ruled out   entirely. NG tube is in the stomach. A central line is seen on the right   and the tip is in the superior vena cava. No pneumothorax.      < from: CT Abdomen and Pelvis No Cont (12.06.19 @ 16:48) >  FINDINGS:    LOWER CHEST: Within normal limits.    LIVER: Within normal limits.  BILE DUCTS: Normal caliber.  GALLBLADDER: Within normal limits.  SPLEEN: Within normal limits.  PANCREAS: Within normal limits.  ADRENALS: Within normal limits.  KIDNEYS/URETERS: No hydronephrosis. Nonobstructing bilateral renal   calculi.    BLADDER: Within normal limits.  REPRODUCTIVE ORGANS: Prostatectomy.    BOWEL: High-grade small bowel obstruction with transition point in the   right lower abdomen (series 602, image 34).  PERITONEUM: No ascites.  VESSELS: Atherosclerotic changes.  RETROPERITONEUM/LYMPH NODES: No lymphadenopathy.  ABDOMINAL WALL: Within normal limits.  BONES: Degenerative changes.    IMPRESSION:     High-grade small bowel obstruction with transition point in the right   lower quadrant.

## 2019-12-13 NOTE — CONSULT NOTE ADULT - ASSESSMENT
A/P:        [  ] Initiate TPN formula:  Carbohydrates:______grams, Amino Acid:______grams, Lipids:_______ grams  for Protein-Calorie Malnutrition    1. Dedicated Central line must be placed for TPN, no TPN will be ordered until line is placed  2. Strict Intake and Output.  3. Weights three times a week  4. Monitor BMP, Mg, Ionized Ca, Phosphorus daily  5. Check Triglycerides daily for first 3 days of TPN, then monthly   6. Pre-albumin weekly.  7. Fingersticks to monitor glucose every 6 hours until stable then may be decreased to twice a day, coverage with ISS - to be ordered by primary team  8. Continue as per Medicine/ Surgery, will follow with you  D/w primary team    Andreina Hubbard PA-C  TPN team, pager [phone]  D/w Ana M Siegel A/P: 74 year old male w/ PMH of COPD and EtOH dependence with PSH/o appy, prostate surgery, & spine surgery  septic shock and high grade SBO s/p exploratory laparotomy, lysis of adhesions, decompression of bowel via enterotomy w/ primary repair, and Abthera VAC placement on 12/6; s/p take down of Abthera, washout and re-application of the Abthera vac on 12/8. Now s/p SBR and end ileostomy on 12/10.   TPN consulted to assist w/ management of pt's nutrition in pt w/ prolonged hospital course who remains NPO w/NGT      TPN to be initiated today in pt w/ Protein-Calorie Malnutrition  Amino Acids 60g, Dextrose 140g, Lipids 25g in 2400mL  Micronutrients: 10ml MVI, 3ml MTE-5  NaCl 40mEq, NaAce 60mEq, NaPhos, 60mMol, KCl 80mEq, Ca 10mEq, Mg 12mEq       TPN recommendations from RD  120 grams amino acids (800ml 15% a.a.)  210 grams dextrose (300ml 70% dex)  50 grams SMOFlipid (250ml 20%IVFE @ 20.8ml/hr x 12 hours)    Total Calories: 1694 gregorio/day (31 gregorio/Kg per dosing wt 54.2Kg)  Total Protein: 120 Gm/day (2.2 Gm/Kg per dosing wt 54.2Kg)  Non-Protein Calories: 1214 gregorio/day (22 gregorio/Kg)  Dextrose Infusion Rate: 2.7 mg/Kg/min  Lipid Infusion Rate: 0.92 Gm/Kg/day; 0.08 Gm/Kg/hr         1. Pt will need a Central line w/dedicated port for only TPN  2. Strict Intake and Output.  3. Weights three times a week  4. Monitor BMP, Mg, Ionized Ca, Phosphorus daily  5. Triglycerides daily for first 3 days of TPN or until stable, then weekly   6. Pre-albumin weekly  7. Hyperglycemia - consider HgA1c       TPN initiated w/  Insulin 5U to manage hyperglycemia      Fingersticks to monitor glucose every 6 hours until stable, may be decreased to twice a day      ISS coverage - to be ordered by primary team  8. HypoPhos & Low K initiate TPN with 60mEq NaPhos & 80mEqKCl to compound concern for Refeeding Syndrome  Continue as per SICU / Surgery, will follow with you, D/w primary team    Andreina Hubbard PA-C  TPN team, pager [phone]  D/w Ana M Siegel

## 2019-12-13 NOTE — PROGRESS NOTE ADULT - SUBJECTIVE AND OBJECTIVE BOX
Subjective: Patient seen and examined. No new events except as noted.     24 HOUR EVENTS:  - discontinued rosas, passed trial of void  - diuresed w/ 20 of lasix  - pt pulled out NGT in the AM, reinserted in PM w/ immediate output of about 600cc  - persistently tachycardic to 100~110's, metoprolol increased from 5mg q8 to q6    REVIEW OF SYSTEMS:    CONSTITUTIONAL:+ weakness, fevers or chills  EYES/ENT: No visual changes;  No vertigo or throat pain   NECK: No pain or stiffness  RESPIRATORY: No cough, wheezing, hemoptysis; No shortness of breath  CARDIOVASCULAR: No chest pain or palpitations  GASTROINTESTINAL:+ abdominal or epigastric pain. No nausea, vomiting, or hematemesis; No diarrhea or constipation. No melena or hematochezia.  GENITOURINARY: No dysuria, frequency or hematuria  NEUROLOGICAL: No numbness or weakness  SKIN: No itching, burning, rashes, or lesions   All other review of systems is negative unless indicated above.          MEDICATIONS:  MEDICATIONS  (STANDING):  acetylcysteine 20%  Inhalation 4 milliLiter(s) Inhalation every 6 hours  albuterol/ipratropium for Nebulization 3 milliLiter(s) Nebulizer every 6 hours  buDESOnide    Inhalation Suspension 0.5 milliGRAM(s) Inhalation every 12 hours  chlorhexidine 2% Cloths 1 Application(s) Topical <User Schedule>  dextrose 5% + lactated ringers. 1000 milliLiter(s) (50 mL/Hr) IV Continuous <Continuous>  enoxaparin Injectable 40 milliGRAM(s) SubCutaneous daily  fat emulsion (Fish Oil and Plant Based) 20% Infusion 10.4 mL/Hr (10.4 mL/Hr) IV Continuous <Continuous>  metoprolol tartrate Injectable 5 milliGRAM(s) IV Push every 4 hours  Parenteral Nutrition - Adult 1 Each (100 mL/Hr) TPN Continuous <Continuous>  piperacillin/tazobactam IVPB.. 3.375 Gram(s) IV Intermittent every 8 hours      PHYSICAL EXAM:  T(C): 37.1 (12-13-19 @ 11:00), Max: 37.7 (12-13-19 @ 03:00)  HR: 87 (12-13-19 @ 13:00) (79 - 135)  BP: 162/77 (12-13-19 @ 13:00) (116/58 - 162/77)  RR: 34 (12-13-19 @ 13:00) (13 - 39)  SpO2: 95% (12-13-19 @ 13:00) (90% - 100%)  Wt(kg): --  I&O's Summary    12 Dec 2019 07:01  -  13 Dec 2019 07:00  --------------------------------------------------------  IN: 2380.8 mL / OUT: 4230 mL / NET: -1849.2 mL    13 Dec 2019 07:01  -  13 Dec 2019 13:46  --------------------------------------------------------  IN: 841.5 mL / OUT: 870 mL / NET: -28.5 mL          Appearance: Lethargic   HEENT:   Dry oral mucosa RIJ line   Lymphatic: No lymphadenopathy  Cardiovascular: Normal S1 S2, No JVD, No murmurs , Peripheral pulses palpable 2+ bilaterally  Respiratory: Decreased bs   Gastrointestinal:  Appropriately tender, +VAC  Skin: No rashes, No ecchymoses, No cyanosis, warm to touch  Musculoskeletal: Decreased  range of motion and strength  Psychiatry:  mildly sedated   Ext: No edema      LABS:    CARDIAC MARKERS:                                10.3   15.99 )-----------( 147      ( 13 Dec 2019 01:44 )             31.6     12-13    145  |  108  |  6<L>  ----------------------------<  116<H>  3.6   |  30  |  0.56    Ca    7.6<L>      13 Dec 2019 01:44  Phos  1.9     12-13  Mg     2.0     12-13    TPro  4.7<L>  /  Alb  2.1<L>  /  TBili  0.6  /  DBili  0.2  /  AST  30  /  ALT  30  /  AlkPhos  48  12-13    proBNP:   Lipid Profile:   HgA1c:   TSH:             TELEMETRY: SR ST	    ECG:  	  RADIOLOGY:   < from: Xray Chest 1 View- PORTABLE-Routine (12.13.19 @ 07:08) >    EXAM:  XR CHEST PORTABLE ROUTINE 1V                            PROCEDURE DATE:  12/13/2019            INTERPRETATION:  A single chest x-ray was obtained on 7/13/2019.    Indication: Redness of breath.    Impression:    The heart is normal in size. Left pleural effusion. Bibasilar pneumonia   and/or atelectasis cannot be ruled out. NG tube is in the stomach. A   central line is seen on the right and the tip is in the superior vena   cava. No pneumothorax. Calcified aortic knob.                    TRUDI DIETZ M.D., ATTENDING RADIOLOGIST  This document has been electronically signed. Dec 13 2019 11:43AM                < end of copied text >    DIAGNOSTIC TESTING:  [ ] Echocardiogram:  [ ]  Catheterization:  [ ] Stress Test:    OTHER:

## 2019-12-13 NOTE — PROGRESS NOTE ADULT - SUBJECTIVE AND OBJECTIVE BOX
SICU DAILY PROGRESS NOTE    74 year old male with a past medical history of COPD and EtOH dependence who presented today with abdominal pain, nausea, hematemesis, and poor PO intake for ~2-3 days. Labs significant for an CHI w/ Cr 2.74 and lactate of 9.4. He was also notably tachycardic to the 110s and hypotensive w/ SBP in the 70s. He was given 2 units of PRBCs and 2 L of LR in the ED due to concern for upper GI bleeding. Imaging revealed high grade SBO in the RLQ. Patient was taken to the OR emergently for an exploratory laparotomy, lysis of adhesions, decompression of bowel via enterotomy w/ primary repair, and Abthera VAC placement. Of note, the distal 50% of the bowel appeared dusky but viable. He required vasopressor support with phenylephrine and vasopressin infusions. He received 3000 mL of crystalloid w/ EBL of 10 mL and UOP of 25 mL. Patient was left intubated at the end of the case so SICU was consulted for hemodynamic monitoring. Taken to the OR on 12/6 and underwent Ex-lap with MIRYAM, enterotomy for bowel decompression and Abthera VAC placement. Taken back to the OR on 12/8 and underwent take down of Abthera, washout and re-application of the Abthera vac. Went back to OR on 12/10 night for colectomy.      24 HOUR EVENTS:  - discontinued rosas, passed trial of void  - diuresed w/ 20 of lasix  - pt pulled out NGT in the AM, reinserted in PM w/ immediate output of about 600cc  - persistently tachycardic to 100~110's, metoprolol increased from 5mg q8 to q6    SUBJECTIVE/ROS:  [x] A ten-point review of systems was otherwise negative except as noted.  [ ] Due to altered mental status/intubation, subjective information were not able to be obtained from the patient. History was obtained, to the extent possible, from review of the chart and collateral sources of information.      NEURO  AOx3  Exam: awake, alert, oriented  Meds: acetaminophen  IVPB .. 1000 milliGRAM(s) IV Intermittent every 6 hours PRN Moderate Pain (4 - 6)    [x] Adequacy of sedation and pain control has been assessed and adjusted      RESPIRATORY  RR: 28 (12-13-19 @ 03:00) (14 - 35)  SpO2: 97% (12-13-19 @ 03:00) (77% - 100%)  Wt(kg): --  Exam: unlabored, clear to auscultation bilaterally  Mechanical Ventilation:   ABG - ( 12 Dec 2019 01:27 )  pH: 7.39  /  pCO2: 46    /  pO2: 138   / HCO3: 27    / Base Excess: 2.1   /  SaO2: 99      Lactate: x                [N/A] Extubation Readiness Assessed  Meds: acetylcysteine 20%  Inhalation 4 milliLiter(s) Inhalation every 6 hours  albuterol/ipratropium for Nebulization 3 milliLiter(s) Nebulizer every 6 hours  buDESOnide    Inhalation Suspension 0.5 milliGRAM(s) Inhalation every 12 hours        CARDIOVASCULAR  HR: 126 (12-13-19 @ 03:00) (81 - 135)  BP: 153/78 (12-13-19 @ 03:00) (92/50 - 153/78)  BP(mean): 110 (12-13-19 @ 03:00) (63 - 110)  ABP: 123/76 (12-12-19 @ 06:00) (111/62 - 123/76)  ABP(mean): 92 (12-12-19 @ 06:00) (76 - 92)  Wt(kg): --  CVP(cm H2O): --  VBG - ( 13 Dec 2019 01:37 )  pH: 7.44  /  pCO2: 52    /  pO2: 48    / HCO3: 35    / Base Excess: 9.7   /  SaO2: 84     Lactate: 0.9                Exam: regular rate and rhythm  Cardiac Rhythm: sinus  Perfusion     [x]Adequate   [ ]Inadequate  Mentation   [x]Normal       [ ]Reduced  Extremities  [x]Warm         [ ]Cool  Volume Status [ ]Hypervolemic [x]Euvolemic [ ]Hypovolemic  Meds: metoprolol tartrate Injectable 5 milliGRAM(s) IV Push every 4 hours        GI/NUTRITION  Exam: soft, nontender, nondistended, incision C/D/I  Diet: NPO    GENITOURINARY  I&O's Detail    12-11 @ 07:01  -  12-12 @ 07:00  --------------------------------------------------------  IN:    Albumin 5%  - 250 mL: 250 mL    dexmedetomidine Infusion: 36.5 mL    dextrose 5% + lactated ringers.: 2200 mL    Enteral Tube Flush: 90 mL    IV PiggyBack: 612.5 mL    multiple electrolytes Injection Type 1multiple electrolytes Injection Type 1: 250 mL    norepinephrine Infusion: 68.2 mL    Solution: 250 mL    vasopressin Infusion: 28.8 mL  Total IN: 3786 mL    OUT:    Bulb: 635 mL    Ileostomy: 1515 mL    Indwelling Catheter - Urethral: 1080 mL    Nasoenteral Tube: 300 mL  Total OUT: 3530 mL    Total NET: 256 mL      12-12 @ 07:01  -  12-13 @ 03:14  --------------------------------------------------------  IN:    dextrose 5% + lactated ringers.: 1010 mL    IV PiggyBack: 512.5 mL    Solution: 400 mL  Total IN: 1922.5 mL    OUT:    Bulb: 370 mL    Emesis: 200 mL    Ileostomy: 150 mL    Indwelling Catheter - Urethral: 1260 mL    Nasoenteral Tube: 650 mL    Voided: 100 mL  Total OUT: 2730 mL    Total NET: -807.5 mL          12-13    145  |  108  |  6<L>  ----------------------------<  116<H>  3.6   |  30  |  0.56    Ca    7.6<L>      13 Dec 2019 01:44  Phos  1.9     12-13  Mg     2.0     12-13    TPro  4.7<L>  /  Alb  2.1<L>  /  TBili  0.6  /  DBili  0.2  /  AST  30  /  ALT  30  /  AlkPhos  48  12-13    [ ] Rosas catheter, indication: N/A  Meds: dextrose 5% + lactated ringers. 1000 milliLiter(s) IV Continuous <Continuous>        HEMATOLOGIC  Meds: enoxaparin Injectable 40 milliGRAM(s) SubCutaneous daily    [x] VTE Prophylaxis                        10.3   15.99 )-----------( 147      ( 13 Dec 2019 01:44 )             31.6     PT/INR - ( 13 Dec 2019 01:44 )   PT: 13.1 sec;   INR: 1.13 ratio         PTT - ( 13 Dec 2019 01:44 )  PTT:30.0 sec  Transfusion     [ ] PRBC   [ ] Platelets   [ ] FFP   [ ] Cryoprecipitate      INFECTIOUS DISEASES  WBC Count: 15.99 K/uL (12-13 @ 01:44)  WBC Count: 15.09 K/uL (12-12 @ 12:41)    RECENT CULTURES:    Meds: piperacillin/tazobactam IVPB.. 3.375 Gram(s) IV Intermittent every 8 hours      ENDOCRINE  CAPILLARY BLOOD GLUCOSE      Meds:       ACCESS DEVICES:  [x] Peripheral IV  [ ] Central Venous Line	[ ] R	[ ] L	[ ] IJ	[ ] Fem	[ ] SC	Placed:   [ ] Arterial Line		[ ] R	[ ] L	[ ] Fem	[ ] Rad	[ ] Ax	Placed:   [ ] PICC:					[ ] Mediport  [ ] Urinary Catheter, Date Placed:   [x] Necessity of urinary, arterial, and venous catheters discussed    OTHER MEDICATIONS:  chlorhexidine 2% Cloths 1 Application(s) Topical <User Schedule>      CODE STATUS: full

## 2019-12-14 LAB
ALBUMIN SERPL ELPH-MCNC: 2.3 G/DL — LOW (ref 3.3–5)
ALBUMIN SERPL ELPH-MCNC: 2.4 G/DL — LOW (ref 3.3–5)
ALP SERPL-CCNC: 52 U/L — SIGNIFICANT CHANGE UP (ref 40–120)
ALP SERPL-CCNC: 54 U/L — SIGNIFICANT CHANGE UP (ref 40–120)
ALT FLD-CCNC: 25 U/L — SIGNIFICANT CHANGE UP (ref 10–45)
ALT FLD-CCNC: 30 U/L — SIGNIFICANT CHANGE UP (ref 10–45)
ANION GAP SERPL CALC-SCNC: 10 MMOL/L — SIGNIFICANT CHANGE UP (ref 5–17)
ANION GAP SERPL CALC-SCNC: 9 MMOL/L — SIGNIFICANT CHANGE UP (ref 5–17)
APTT BLD: 28.4 SEC — SIGNIFICANT CHANGE UP (ref 27.5–36.3)
AST SERPL-CCNC: 22 U/L — SIGNIFICANT CHANGE UP (ref 10–40)
AST SERPL-CCNC: 22 U/L — SIGNIFICANT CHANGE UP (ref 10–40)
BASE EXCESS BLDV CALC-SCNC: 6.6 MMOL/L — HIGH (ref -2–2)
BILIRUB DIRECT SERPL-MCNC: 0.3 MG/DL — HIGH (ref 0–0.2)
BILIRUB DIRECT SERPL-MCNC: 0.3 MG/DL — HIGH (ref 0–0.2)
BILIRUB INDIRECT FLD-MCNC: 0.5 MG/DL — SIGNIFICANT CHANGE UP (ref 0.2–1)
BILIRUB INDIRECT FLD-MCNC: 0.5 MG/DL — SIGNIFICANT CHANGE UP (ref 0.2–1)
BILIRUB SERPL-MCNC: 0.8 MG/DL — SIGNIFICANT CHANGE UP (ref 0.2–1.2)
BILIRUB SERPL-MCNC: 0.8 MG/DL — SIGNIFICANT CHANGE UP (ref 0.2–1.2)
BUN SERPL-MCNC: 6 MG/DL — LOW (ref 7–23)
BUN SERPL-MCNC: 8 MG/DL — SIGNIFICANT CHANGE UP (ref 7–23)
CA-I SERPL-SCNC: 1.12 MMOL/L — SIGNIFICANT CHANGE UP (ref 1.12–1.3)
CALCIUM SERPL-MCNC: 7.6 MG/DL — LOW (ref 8.4–10.5)
CALCIUM SERPL-MCNC: 8 MG/DL — LOW (ref 8.4–10.5)
CHLORIDE BLDV-SCNC: 108 MMOL/L — SIGNIFICANT CHANGE UP (ref 96–108)
CHLORIDE SERPL-SCNC: 104 MMOL/L — SIGNIFICANT CHANGE UP (ref 96–108)
CHLORIDE SERPL-SCNC: 106 MMOL/L — SIGNIFICANT CHANGE UP (ref 96–108)
CO2 BLDV-SCNC: 32 MMOL/L — HIGH (ref 22–30)
CO2 SERPL-SCNC: 27 MMOL/L — SIGNIFICANT CHANGE UP (ref 22–31)
CO2 SERPL-SCNC: 28 MMOL/L — SIGNIFICANT CHANGE UP (ref 22–31)
CREAT SERPL-MCNC: 0.61 MG/DL — SIGNIFICANT CHANGE UP (ref 0.5–1.3)
CREAT SERPL-MCNC: 0.62 MG/DL — SIGNIFICANT CHANGE UP (ref 0.5–1.3)
GAS PNL BLDV: 139 MMOL/L — SIGNIFICANT CHANGE UP (ref 135–145)
GAS PNL BLDV: SIGNIFICANT CHANGE UP
GAS PNL BLDV: SIGNIFICANT CHANGE UP
GLUCOSE BLDC GLUCOMTR-MCNC: 110 MG/DL — HIGH (ref 70–99)
GLUCOSE BLDV-MCNC: 109 MG/DL — HIGH (ref 70–99)
GLUCOSE SERPL-MCNC: 113 MG/DL — HIGH (ref 70–99)
GLUCOSE SERPL-MCNC: 93 MG/DL — SIGNIFICANT CHANGE UP (ref 70–99)
HBA1C BLD-MCNC: 5.6 % — SIGNIFICANT CHANGE UP (ref 4–5.6)
HCO3 BLDV-SCNC: 31 MMOL/L — HIGH (ref 21–29)
HCT VFR BLD CALC: 33.2 % — LOW (ref 39–50)
HCT VFR BLD CALC: 35.7 % — LOW (ref 39–50)
HCT VFR BLDA CALC: 36 % — LOW (ref 39–50)
HGB BLD CALC-MCNC: 11.6 G/DL — LOW (ref 13–17)
HGB BLD-MCNC: 11 G/DL — LOW (ref 13–17)
HGB BLD-MCNC: 11.6 G/DL — LOW (ref 13–17)
HOROWITZ INDEX BLDV+IHG-RTO: 28 — SIGNIFICANT CHANGE UP
INR BLD: 1.09 RATIO — SIGNIFICANT CHANGE UP (ref 0.88–1.16)
LACTATE BLDV-MCNC: 1.1 MMOL/L — SIGNIFICANT CHANGE UP (ref 0.7–2)
MAGNESIUM SERPL-MCNC: 1.9 MG/DL — SIGNIFICANT CHANGE UP (ref 1.6–2.6)
MAGNESIUM SERPL-MCNC: 2.3 MG/DL — SIGNIFICANT CHANGE UP (ref 1.6–2.6)
MCHC RBC-ENTMCNC: 30.6 PG — SIGNIFICANT CHANGE UP (ref 27–34)
MCHC RBC-ENTMCNC: 30.6 PG — SIGNIFICANT CHANGE UP (ref 27–34)
MCHC RBC-ENTMCNC: 32.5 GM/DL — SIGNIFICANT CHANGE UP (ref 32–36)
MCHC RBC-ENTMCNC: 33.1 GM/DL — SIGNIFICANT CHANGE UP (ref 32–36)
MCV RBC AUTO: 92.2 FL — SIGNIFICANT CHANGE UP (ref 80–100)
MCV RBC AUTO: 94.2 FL — SIGNIFICANT CHANGE UP (ref 80–100)
NRBC # BLD: 0 /100 WBCS — SIGNIFICANT CHANGE UP (ref 0–0)
NRBC # BLD: 0 /100 WBCS — SIGNIFICANT CHANGE UP (ref 0–0)
OTHER CELLS CSF MANUAL: 12 ML/DL — LOW (ref 18–22)
PCO2 BLDV: 45 MMHG — SIGNIFICANT CHANGE UP (ref 35–50)
PH BLDV: 7.45 — SIGNIFICANT CHANGE UP (ref 7.35–7.45)
PHOSPHATE SERPL-MCNC: 2.4 MG/DL — LOW (ref 2.5–4.5)
PHOSPHATE SERPL-MCNC: 3.2 MG/DL — SIGNIFICANT CHANGE UP (ref 2.5–4.5)
PLATELET # BLD AUTO: 203 K/UL — SIGNIFICANT CHANGE UP (ref 150–400)
PLATELET # BLD AUTO: 235 K/UL — SIGNIFICANT CHANGE UP (ref 150–400)
PO2 BLDV: 40 MMHG — SIGNIFICANT CHANGE UP (ref 25–45)
POTASSIUM BLDV-SCNC: 3 MMOL/L — LOW (ref 3.5–5.3)
POTASSIUM SERPL-MCNC: 3.3 MMOL/L — LOW (ref 3.5–5.3)
POTASSIUM SERPL-MCNC: 3.5 MMOL/L — SIGNIFICANT CHANGE UP (ref 3.5–5.3)
POTASSIUM SERPL-SCNC: 3.3 MMOL/L — LOW (ref 3.5–5.3)
POTASSIUM SERPL-SCNC: 3.5 MMOL/L — SIGNIFICANT CHANGE UP (ref 3.5–5.3)
PROT SERPL-MCNC: 5 G/DL — LOW (ref 6–8.3)
PROT SERPL-MCNC: 5.1 G/DL — LOW (ref 6–8.3)
PROTHROM AB SERPL-ACNC: 12.5 SEC — SIGNIFICANT CHANGE UP (ref 10–12.9)
RBC # BLD: 3.6 M/UL — LOW (ref 4.2–5.8)
RBC # BLD: 3.79 M/UL — LOW (ref 4.2–5.8)
RBC # FLD: 14 % — SIGNIFICANT CHANGE UP (ref 10.3–14.5)
RBC # FLD: 14.3 % — SIGNIFICANT CHANGE UP (ref 10.3–14.5)
SAO2 % BLDV: 74 % — SIGNIFICANT CHANGE UP (ref 67–88)
SODIUM SERPL-SCNC: 142 MMOL/L — SIGNIFICANT CHANGE UP (ref 135–145)
SODIUM SERPL-SCNC: 142 MMOL/L — SIGNIFICANT CHANGE UP (ref 135–145)
WBC # BLD: 14.6 K/UL — HIGH (ref 3.8–10.5)
WBC # BLD: 16.08 K/UL — HIGH (ref 3.8–10.5)
WBC # FLD AUTO: 14.6 K/UL — HIGH (ref 3.8–10.5)
WBC # FLD AUTO: 16.08 K/UL — HIGH (ref 3.8–10.5)

## 2019-12-14 PROCEDURE — 99233 SBSQ HOSP IP/OBS HIGH 50: CPT

## 2019-12-14 PROCEDURE — 71045 X-RAY EXAM CHEST 1 VIEW: CPT | Mod: 26,76

## 2019-12-14 PROCEDURE — 99232 SBSQ HOSP IP/OBS MODERATE 35: CPT

## 2019-12-14 RX ORDER — ACETAZOLAMIDE 250 MG/1
250 TABLET ORAL ONCE
Refills: 0 | Status: COMPLETED | OUTPATIENT
Start: 2019-12-14 | End: 2019-12-14

## 2019-12-14 RX ORDER — FUROSEMIDE 40 MG
20 TABLET ORAL ONCE
Refills: 0 | Status: COMPLETED | OUTPATIENT
Start: 2019-12-14 | End: 2019-12-14

## 2019-12-14 RX ORDER — CHLORHEXIDINE GLUCONATE 213 G/1000ML
1 SOLUTION TOPICAL
Refills: 0 | Status: DISCONTINUED | OUTPATIENT
Start: 2019-12-14 | End: 2019-12-19

## 2019-12-14 RX ORDER — INSULIN LISPRO 100/ML
VIAL (ML) SUBCUTANEOUS EVERY 4 HOURS
Refills: 0 | Status: DISCONTINUED | OUTPATIENT
Start: 2019-12-14 | End: 2019-12-18

## 2019-12-14 RX ORDER — POTASSIUM PHOSPHATE, MONOBASIC POTASSIUM PHOSPHATE, DIBASIC 236; 224 MG/ML; MG/ML
15 INJECTION, SOLUTION INTRAVENOUS ONCE
Refills: 0 | Status: COMPLETED | OUTPATIENT
Start: 2019-12-14 | End: 2019-12-14

## 2019-12-14 RX ORDER — POTASSIUM CHLORIDE 20 MEQ
20 PACKET (EA) ORAL
Refills: 0 | Status: COMPLETED | OUTPATIENT
Start: 2019-12-14 | End: 2019-12-14

## 2019-12-14 RX ORDER — MAGNESIUM SULFATE 500 MG/ML
1 VIAL (ML) INJECTION ONCE
Refills: 0 | Status: COMPLETED | OUTPATIENT
Start: 2019-12-14 | End: 2019-12-14

## 2019-12-14 RX ORDER — SODIUM CHLORIDE 9 MG/ML
1000 INJECTION, SOLUTION INTRAVENOUS
Refills: 0 | Status: DISCONTINUED | OUTPATIENT
Start: 2019-12-14 | End: 2019-12-14

## 2019-12-14 RX ORDER — POTASSIUM CHLORIDE 20 MEQ
10 PACKET (EA) ORAL
Refills: 0 | Status: COMPLETED | OUTPATIENT
Start: 2019-12-14 | End: 2019-12-14

## 2019-12-14 RX ORDER — ELECTROLYTE SOLUTION,INJ
1 VIAL (ML) INTRAVENOUS
Refills: 0 | Status: DISCONTINUED | OUTPATIENT
Start: 2019-12-14 | End: 2019-12-14

## 2019-12-14 RX ORDER — SODIUM CHLORIDE 9 MG/ML
10 INJECTION INTRAMUSCULAR; INTRAVENOUS; SUBCUTANEOUS
Refills: 0 | Status: DISCONTINUED | OUTPATIENT
Start: 2019-12-14 | End: 2019-12-19

## 2019-12-14 RX ORDER — I.V. FAT EMULSION 20 G/100ML
10.4 EMULSION INTRAVENOUS
Qty: 25 | Refills: 0 | Status: DISCONTINUED | OUTPATIENT
Start: 2019-12-14 | End: 2019-12-15

## 2019-12-14 RX ADMIN — Medication 100 MILLIEQUIVALENT(S): at 09:42

## 2019-12-14 RX ADMIN — Medication 50 MILLIEQUIVALENT(S): at 18:39

## 2019-12-14 RX ADMIN — Medication 1 EACH: at 19:21

## 2019-12-14 RX ADMIN — Medication 5 MILLIGRAM(S): at 16:19

## 2019-12-14 RX ADMIN — Medication 5 MILLIGRAM(S): at 08:10

## 2019-12-14 RX ADMIN — ACETAZOLAMIDE 250 MILLIGRAM(S): 250 TABLET ORAL at 05:02

## 2019-12-14 RX ADMIN — Medication 5 MILLIGRAM(S): at 19:22

## 2019-12-14 RX ADMIN — Medication 100 MILLIEQUIVALENT(S): at 03:53

## 2019-12-14 RX ADMIN — PIPERACILLIN AND TAZOBACTAM 25 GRAM(S): 4; .5 INJECTION, POWDER, LYOPHILIZED, FOR SOLUTION INTRAVENOUS at 05:01

## 2019-12-14 RX ADMIN — CHLORHEXIDINE GLUCONATE 1 APPLICATION(S): 213 SOLUTION TOPICAL at 05:01

## 2019-12-14 RX ADMIN — Medication 20 MILLIGRAM(S): at 17:08

## 2019-12-14 RX ADMIN — Medication 100 MILLIEQUIVALENT(S): at 12:12

## 2019-12-14 RX ADMIN — PIPERACILLIN AND TAZOBACTAM 25 GRAM(S): 4; .5 INJECTION, POWDER, LYOPHILIZED, FOR SOLUTION INTRAVENOUS at 21:40

## 2019-12-14 RX ADMIN — POTASSIUM PHOSPHATE, MONOBASIC POTASSIUM PHOSPHATE, DIBASIC 62.5 MILLIMOLE(S): 236; 224 INJECTION, SOLUTION INTRAVENOUS at 14:34

## 2019-12-14 RX ADMIN — SODIUM CHLORIDE 50 MILLILITER(S): 9 INJECTION, SOLUTION INTRAVENOUS at 08:10

## 2019-12-14 RX ADMIN — Medication 4 MILLILITER(S): at 11:33

## 2019-12-14 RX ADMIN — Medication 100 GRAM(S): at 18:39

## 2019-12-14 RX ADMIN — Medication 5 MILLIGRAM(S): at 03:24

## 2019-12-14 RX ADMIN — SODIUM CHLORIDE 50 MILLILITER(S): 9 INJECTION, SOLUTION INTRAVENOUS at 05:01

## 2019-12-14 RX ADMIN — Medication 1 EACH: at 17:07

## 2019-12-14 RX ADMIN — Medication 100 MILLIEQUIVALENT(S): at 05:01

## 2019-12-14 RX ADMIN — SODIUM CHLORIDE 30 MILLILITER(S): 9 INJECTION, SOLUTION INTRAVENOUS at 16:19

## 2019-12-14 RX ADMIN — Medication 3 MILLILITER(S): at 11:33

## 2019-12-14 RX ADMIN — Medication 5 MILLIGRAM(S): at 12:12

## 2019-12-14 RX ADMIN — ENOXAPARIN SODIUM 40 MILLIGRAM(S): 100 INJECTION SUBCUTANEOUS at 17:08

## 2019-12-14 RX ADMIN — Medication 50 MILLIEQUIVALENT(S): at 17:08

## 2019-12-14 RX ADMIN — Medication 0.5 MILLIGRAM(S): at 17:22

## 2019-12-14 RX ADMIN — I.V. FAT EMULSION 10.4 ML/HR: 20 EMULSION INTRAVENOUS at 17:07

## 2019-12-14 RX ADMIN — Medication 100 MILLIEQUIVALENT(S): at 03:24

## 2019-12-14 RX ADMIN — Medication 4 MILLILITER(S): at 17:22

## 2019-12-14 RX ADMIN — I.V. FAT EMULSION 10.4 ML/HR: 20 EMULSION INTRAVENOUS at 19:21

## 2019-12-14 RX ADMIN — POTASSIUM PHOSPHATE, MONOBASIC POTASSIUM PHOSPHATE, DIBASIC 62.5 MILLIMOLE(S): 236; 224 INJECTION, SOLUTION INTRAVENOUS at 03:24

## 2019-12-14 RX ADMIN — PIPERACILLIN AND TAZOBACTAM 25 GRAM(S): 4; .5 INJECTION, POWDER, LYOPHILIZED, FOR SOLUTION INTRAVENOUS at 14:34

## 2019-12-14 RX ADMIN — SODIUM CHLORIDE 10 MILLILITER(S): 9 INJECTION, SOLUTION INTRAVENOUS at 19:22

## 2019-12-14 RX ADMIN — SODIUM CHLORIDE 30 MILLILITER(S): 9 INJECTION, SOLUTION INTRAVENOUS at 09:42

## 2019-12-14 RX ADMIN — Medication 4 MILLILITER(S): at 00:11

## 2019-12-14 RX ADMIN — Medication 20 MILLIGRAM(S): at 09:41

## 2019-12-14 RX ADMIN — Medication 3 MILLILITER(S): at 00:11

## 2019-12-14 RX ADMIN — ACETAZOLAMIDE 250 MILLIGRAM(S): 250 TABLET ORAL at 17:08

## 2019-12-14 RX ADMIN — Medication 3 MILLILITER(S): at 17:22

## 2019-12-14 NOTE — PROGRESS NOTE ADULT - SUBJECTIVE AND OBJECTIVE BOX
[facility] NUTRITION SUPPORT--  Attending/ PA FOLLOW UP NOTE      24 hour events/subjective: Denies Palpitations, chest pain, shortness of breath. Denies nausea nor vomiting nor abdominal pain.        PAST HISTORY  --------------------------------------------------------------------------------  No significant changes to PMH, PSH, FHx, SHx, unless otherwise noted    ALLERGIES & MEDICATIONS  --------------------------------------------------------------------------------  Allergies    No Known Allergies    Intolerances      Standing Inpatient Medications  acetaZOLAMIDE Injectable 250 milliGRAM(s) IV Push once  acetylcysteine 20%  Inhalation 4 milliLiter(s) Inhalation every 6 hours  albuterol/ipratropium for Nebulization 3 milliLiter(s) Nebulizer every 6 hours  buDESOnide    Inhalation Suspension 0.5 milliGRAM(s) Inhalation every 12 hours  chlorhexidine 2% Cloths 1 Application(s) Topical <User Schedule>  dextrose 5% + lactated ringers. 1000 milliLiter(s) IV Continuous <Continuous>  enoxaparin Injectable 40 milliGRAM(s) SubCutaneous daily  fat emulsion (Fish Oil and Plant Based) 20% Infusion 10.4 mL/Hr IV Continuous <Continuous>  furosemide   Injectable 20 milliGRAM(s) IV Push once  furosemide   Injectable 20 milliGRAM(s) IV Push once  metoprolol tartrate Injectable 5 milliGRAM(s) IV Push every 4 hours  Parenteral Nutrition - Adult 1 Each TPN Continuous <Continuous>  piperacillin/tazobactam IVPB.. 3.375 Gram(s) IV Intermittent every 8 hours  potassium chloride  10 mEq/100 mL IVPB 10 milliEquivalent(s) IV Intermittent every 1 hour  potassium phosphate IVPB 15 milliMole(s) IV Intermittent once    PRN Inpatient Medications  acetaminophen  IVPB .. 1000 milliGRAM(s) IV Intermittent every 6 hours PRN      REVIEW OF SYSTEMS  --------------------------------------------------------------------------------  Gen: as per HPI  Skin: No rashes  Head/Eyes/Ears/Mouth: No headache;No sore throat  Respiratory: No dyspnea, cough,   CV: No chest pain, PND, orthopnea  GI: as per HPI  : No increased frequency, dysuria, hematuria, nocturia  MSK: No joint pain/swelling; no back pain; no edema  Neuro: No dizziness/lightheadedness, weakness, seizures, numbness, tingling  Psych: No significant nervousness, anxiety, stress, depression    All other systems were reviewed and are negative, except as noted.      LABS/STUDIES  --------------------------------------------------------------------------------              11.6   16.08 >-----------<  203      [12-14-19 @ 02:00]              35.7     142  |  104  |  6   ----------------------------<  113      [12-14-19 @ 02:00]  3.3   |  28  |  0.62        Ca     8.0     [12-14-19 @ 02:00]      Mg     2.3     [12-14-19 @ 02:00]      Phos  2.4     [12-14-19 @ 02:00]    TPro  5.0  /  Alb  2.3  /  TBili  0.8  /  DBili  0.3  /  AST  22  /  ALT  30  /  AlkPhos  52  [12-14-19 @ 02:00]    PT/INR: PT 12.5 , INR 1.09       [12-14-19 @ 02:00]  PTT: 28.4       [12-14-19 @ 02:00]      Blood Gas Calcium, Ionized - Venous: 1.12 mmoL/L (12-14-19 @ 01:43)  Blood Gas Calcium, Ionized - Venous: 1.02 mmoL/L (12-13-19 @ 20:11)  Blood Gas Calcium, Ionized - Venous: 1.12 mmoL/L (12-13-19 @ 01:37)    Glucose, Serum: 113 mg/dL (12-14-19 @ 02:00)  Glucose, Serum: 109 mg/dL (12-13-19 @ 13:55)    Prealbumin, Serum: 5 mg/dL (12-13-19 @ 07:46)    Triglycerides      12-13-19 @ 07:01  -  12-14-19 @ 07:00  --------------------------------------------------------  IN: 3091.5 mL / OUT: 5920 mL / NET: -2828.5 mL    12-14-19 @ 07:01  -  12-14-19 @ 09:39  --------------------------------------------------------  IN: 100 mL / OUT: 1280 mL / NET: -1180 mL        VITALS/PHYSICAL EXAM  --------------------------------------------------------------------------------  T(C): 37.4 (12-14-19 @ 07:00), Max: 37.4 (12-14-19 @ 07:00)  HR: 89 (12-14-19 @ 08:00) (75 - 95)  BP: 141/76 (12-14-19 @ 08:00) (119/59 - 167/66)  RR: 33 (12-14-19 @ 08:00) (13 - 39)  SpO2: 96% (12-14-19 @ 08:00) (90% - 100%)  Wt(kg): --    PHYSICAL EXAM  --------------------------------------------------------------------------------  	Gen: guarded but stable, A&Ox3, NC O2 (+)NGT  	HEENT: NC/AT, PERRL,              Neck: trachea midline  	Chest: non labored breathing. Rt IJ TLC dressing c/d/I   	Abd: softly distended, appropriately tender, VAC midline incision w/ good seal                        (+)ostomy pink viable- serous/bilious drainage                        LLQ JOVANNA w/ serosanguinous drainage              Extrem: WWP  	Skin: Warm, without rashes, good turgor [facility] NUTRITION SUPPORT--  Attending/ PA FOLLOW UP NOTE      24 hour events/subjective: Unable to start TPN yesterday even though ordered. Line was not placed. Pt denies nausea nor vomiting nor abdominal pain. Plan for PICC later today.        PAST HISTORY  --------------------------------------------------------------------------------  No significant changes to PMH, PSH, FHx, SHx, unless otherwise noted    ALLERGIES & MEDICATIONS  --------------------------------------------------------------------------------  Allergies    No Known Allergies    Intolerances      Standing Inpatient Medications  acetaZOLAMIDE Injectable 250 milliGRAM(s) IV Push once  acetylcysteine 20%  Inhalation 4 milliLiter(s) Inhalation every 6 hours  albuterol/ipratropium for Nebulization 3 milliLiter(s) Nebulizer every 6 hours  buDESOnide    Inhalation Suspension 0.5 milliGRAM(s) Inhalation every 12 hours  chlorhexidine 2% Cloths 1 Application(s) Topical <User Schedule>  dextrose 5% + lactated ringers. 1000 milliLiter(s) IV Continuous <Continuous>  enoxaparin Injectable 40 milliGRAM(s) SubCutaneous daily  fat emulsion (Fish Oil and Plant Based) 20% Infusion 10.4 mL/Hr IV Continuous <Continuous>  furosemide   Injectable 20 milliGRAM(s) IV Push once  furosemide   Injectable 20 milliGRAM(s) IV Push once  metoprolol tartrate Injectable 5 milliGRAM(s) IV Push every 4 hours  Parenteral Nutrition - Adult 1 Each TPN Continuous <Continuous>  piperacillin/tazobactam IVPB.. 3.375 Gram(s) IV Intermittent every 8 hours  potassium chloride  10 mEq/100 mL IVPB 10 milliEquivalent(s) IV Intermittent every 1 hour  potassium phosphate IVPB 15 milliMole(s) IV Intermittent once    PRN Inpatient Medications  acetaminophen  IVPB .. 1000 milliGRAM(s) IV Intermittent every 6 hours PRN      REVIEW OF SYSTEMS  --------------------------------------------------------------------------------  Gen: as per HPI  Skin: No rashes  Head/Eyes/Ears/Mouth: No headache;No sore throat  Respiratory: No dyspnea, cough,   CV: No chest pain, PND, orthopnea  GI: as per HPI  : No increased frequency, dysuria, hematuria, nocturia  MSK: No joint pain/swelling; no back pain; no edema  Neuro: No dizziness/lightheadedness, weakness, seizures, numbness, tingling  Psych: No significant nervousness, anxiety, stress, depression    All other systems were reviewed and are negative, except as noted.      LABS/STUDIES  --------------------------------------------------------------------------------              11.6   16.08 >-----------<  203      [12-14-19 @ 02:00]              35.7     142  |  104  |  6   ----------------------------<  113      [12-14-19 @ 02:00]  3.3   |  28  |  0.62        Ca     8.0     [12-14-19 @ 02:00]      Mg     2.3     [12-14-19 @ 02:00]      Phos  2.4     [12-14-19 @ 02:00]    TPro  5.0  /  Alb  2.3  /  TBili  0.8  /  DBili  0.3  /  AST  22  /  ALT  30  /  AlkPhos  52  [12-14-19 @ 02:00]    PT/INR: PT 12.5 , INR 1.09       [12-14-19 @ 02:00]  PTT: 28.4       [12-14-19 @ 02:00]      Blood Gas Calcium, Ionized - Venous: 1.12 mmoL/L (12-14-19 @ 01:43)  Blood Gas Calcium, Ionized - Venous: 1.02 mmoL/L (12-13-19 @ 20:11)  Blood Gas Calcium, Ionized - Venous: 1.12 mmoL/L (12-13-19 @ 01:37)    Glucose, Serum: 113 mg/dL (12-14-19 @ 02:00)  Glucose, Serum: 109 mg/dL (12-13-19 @ 13:55)    Prealbumin, Serum: 5 mg/dL (12-13-19 @ 07:46)    Triglycerides      12-13-19 @ 07:01  -  12-14-19 @ 07:00  --------------------------------------------------------  IN: 3091.5 mL / OUT: 5920 mL / NET: -2828.5 mL    12-14-19 @ 07:01  -  12-14-19 @ 09:39  --------------------------------------------------------  IN: 100 mL / OUT: 1280 mL / NET: -1180 mL        VITALS/PHYSICAL EXAM  --------------------------------------------------------------------------------  T(C): 37.4 (12-14-19 @ 07:00), Max: 37.4 (12-14-19 @ 07:00)  HR: 89 (12-14-19 @ 08:00) (75 - 95)  BP: 141/76 (12-14-19 @ 08:00) (119/59 - 167/66)  RR: 33 (12-14-19 @ 08:00) (13 - 39)  SpO2: 96% (12-14-19 @ 08:00) (90% - 100%)  Wt(kg): --    PHYSICAL EXAM  --------------------------------------------------------------------------------  	Gen: guarded but stable, A&Ox3, NC O2 (+)NGT  	HEENT: NC/AT, PERRL,              Neck: trachea midline  	Chest: non labored breathing. Rt IJ TLC dressing c/d/I   	Abd: softly distended, appropriately tender, VAC midline incision w/ good seal                        (+)ostomy pink viable- serous/bilious drainage                        LLQ JOVANNA w/ serosanguinous drainage              Extrem: WWP  	Skin: Warm, without rashes, good turgor [facility] NUTRITION SUPPORT--  Attending/ PA FOLLOW UP NOTE      24 hour events/subjective: TPN started yesterday, tolerated without issues, pt denied any palpitations, CP nor SOB. Pt denies any nausea, vomiting or abdominal pain. Plan for NGT out later this AM then sips. Pt only received a few hours of trickle TFs yesterday.        PAST HISTORY  --------------------------------------------------------------------------------  No significant changes to PMH, PSH, FHx, SHx, unless otherwise noted    ALLERGIES & MEDICATIONS  --------------------------------------------------------------------------------  Allergies    No Known Allergies    Intolerances      Standing Inpatient Medications  acetaZOLAMIDE Injectable 250 milliGRAM(s) IV Push once  acetylcysteine 20%  Inhalation 4 milliLiter(s) Inhalation every 6 hours  albuterol/ipratropium for Nebulization 3 milliLiter(s) Nebulizer every 6 hours  buDESOnide    Inhalation Suspension 0.5 milliGRAM(s) Inhalation every 12 hours  chlorhexidine 2% Cloths 1 Application(s) Topical <User Schedule>  dextrose 5% + lactated ringers. 1000 milliLiter(s) IV Continuous <Continuous>  enoxaparin Injectable 40 milliGRAM(s) SubCutaneous daily  fat emulsion (Fish Oil and Plant Based) 20% Infusion 10.4 mL/Hr IV Continuous <Continuous>  furosemide   Injectable 20 milliGRAM(s) IV Push once  furosemide   Injectable 20 milliGRAM(s) IV Push once  metoprolol tartrate Injectable 5 milliGRAM(s) IV Push every 4 hours  Parenteral Nutrition - Adult 1 Each TPN Continuous <Continuous>  piperacillin/tazobactam IVPB.. 3.375 Gram(s) IV Intermittent every 8 hours  potassium chloride  10 mEq/100 mL IVPB 10 milliEquivalent(s) IV Intermittent every 1 hour  potassium phosphate IVPB 15 milliMole(s) IV Intermittent once    PRN Inpatient Medications  acetaminophen  IVPB .. 1000 milliGRAM(s) IV Intermittent every 6 hours PRN      REVIEW OF SYSTEMS  --------------------------------------------------------------------------------  Gen: as per HPI  Skin: No rashes  Head/Eyes/Ears/Mouth: No headache;No sore throat  Respiratory: No dyspnea, cough,   CV: No chest pain, PND, orthopnea  GI: as per HPI  : No increased frequency, dysuria, hematuria, nocturia  MSK: No joint pain/swelling; no back pain; no edema  Neuro: No dizziness/lightheadedness, weakness, seizures, numbness, tingling  Psych: No significant nervousness, anxiety, stress, depression    All other systems were reviewed and are negative, except as noted.      LABS/STUDIES  --------------------------------------------------------------------------------              11.6   16.08 >-----------<  203      [12-14-19 @ 02:00]              35.7     142  |  104  |  6   ----------------------------<  113      [12-14-19 @ 02:00]  3.3   |  28  |  0.62        Ca     8.0     [12-14-19 @ 02:00]      Mg     2.3     [12-14-19 @ 02:00]      Phos  2.4     [12-14-19 @ 02:00]    TPro  5.0  /  Alb  2.3  /  TBili  0.8  /  DBili  0.3  /  AST  22  /  ALT  30  /  AlkPhos  52  [12-14-19 @ 02:00]    PT/INR: PT 12.5 , INR 1.09       [12-14-19 @ 02:00]  PTT: 28.4       [12-14-19 @ 02:00]      Blood Gas Calcium, Ionized - Venous: 1.12 mmoL/L (12-14-19 @ 01:43)  Blood Gas Calcium, Ionized - Venous: 1.02 mmoL/L (12-13-19 @ 20:11)  Blood Gas Calcium, Ionized - Venous: 1.12 mmoL/L (12-13-19 @ 01:37)    Glucose, Serum: 113 mg/dL (12-14-19 @ 02:00)  Glucose, Serum: 109 mg/dL (12-13-19 @ 13:55)    Prealbumin, Serum: 5 mg/dL (12-13-19 @ 07:46)    Triglycerides      12-13-19 @ 07:01  -  12-14-19 @ 07:00  --------------------------------------------------------  IN: 3091.5 mL / OUT: 5920 mL / NET: -2828.5 mL    12-14-19 @ 07:01  -  12-14-19 @ 09:39  --------------------------------------------------------  IN: 100 mL / OUT: 1280 mL / NET: -1180 mL        VITALS/PHYSICAL EXAM  --------------------------------------------------------------------------------  T(C): 37.4 (12-14-19 @ 07:00), Max: 37.4 (12-14-19 @ 07:00)  HR: 89 (12-14-19 @ 08:00) (75 - 95)  BP: 141/76 (12-14-19 @ 08:00) (119/59 - 167/66)  RR: 33 (12-14-19 @ 08:00) (13 - 39)  SpO2: 96% (12-14-19 @ 08:00) (90% - 100%)  Wt(kg): --    PHYSICAL EXAM  --------------------------------------------------------------------------------  	Gen: guarded but stable, A&Ox3, NC O2 (+)NGT  	HEENT: NC/AT, PERRL,              Neck: trachea midline. Rt IJ  removed dressing c/d/I  	Chest: non labored breathing.   	Abd: softly distended, appropriately tender, midline incisions clean and dry                       (+)ostomy pink viable- mustard yellow drainage, no flatus                        LLQ JOVANNA w/ serosanguinous drainage              Extrem: WWP  	Skin: Warm, without rashes, good turgor [facility] NUTRITION SUPPORT--  Attending/ PA FOLLOW UP NOTE      24 hour events/subjective: TPN started yesterday, tolerated without issues, pt denied any palpitations, CP nor SOB. Pt denies any nausea, vomiting or abdominal pain. Plan for trickle TF later today.        PAST HISTORY  --------------------------------------------------------------------------------  No significant changes to PMH, PSH, FHx, SHx, unless otherwise noted    ALLERGIES & MEDICATIONS  --------------------------------------------------------------------------------  Allergies    No Known Allergies    Intolerances      Standing Inpatient Medications  acetaZOLAMIDE Injectable 250 milliGRAM(s) IV Push once  acetylcysteine 20%  Inhalation 4 milliLiter(s) Inhalation every 6 hours  albuterol/ipratropium for Nebulization 3 milliLiter(s) Nebulizer every 6 hours  buDESOnide    Inhalation Suspension 0.5 milliGRAM(s) Inhalation every 12 hours  chlorhexidine 2% Cloths 1 Application(s) Topical <User Schedule>  dextrose 5% + lactated ringers. 1000 milliLiter(s) IV Continuous <Continuous>  enoxaparin Injectable 40 milliGRAM(s) SubCutaneous daily  fat emulsion (Fish Oil and Plant Based) 20% Infusion 10.4 mL/Hr IV Continuous <Continuous>  furosemide   Injectable 20 milliGRAM(s) IV Push once  furosemide   Injectable 20 milliGRAM(s) IV Push once  metoprolol tartrate Injectable 5 milliGRAM(s) IV Push every 4 hours  Parenteral Nutrition - Adult 1 Each TPN Continuous <Continuous>  piperacillin/tazobactam IVPB.. 3.375 Gram(s) IV Intermittent every 8 hours  potassium chloride  10 mEq/100 mL IVPB 10 milliEquivalent(s) IV Intermittent every 1 hour  potassium phosphate IVPB 15 milliMole(s) IV Intermittent once    PRN Inpatient Medications  acetaminophen  IVPB .. 1000 milliGRAM(s) IV Intermittent every 6 hours PRN      REVIEW OF SYSTEMS  --------------------------------------------------------------------------------  Gen: as per HPI  Skin: No rashes  Head/Eyes/Ears/Mouth: No headache;No sore throat  Respiratory: No dyspnea, cough,   CV: No chest pain, PND, orthopnea  GI: as per HPI  : No increased frequency, dysuria, hematuria, nocturia  MSK: No joint pain/swelling; no back pain; no edema  Neuro: No dizziness/lightheadedness, weakness, seizures, numbness, tingling  Psych: No significant nervousness, anxiety, stress, depression    All other systems were reviewed and are negative, except as noted.      LABS/STUDIES  --------------------------------------------------------------------------------              11.6   16.08 >-----------<  203      [12-14-19 @ 02:00]              35.7     142  |  104  |  6   ----------------------------<  113      [12-14-19 @ 02:00]  3.3   |  28  |  0.62        Ca     8.0     [12-14-19 @ 02:00]      Mg     2.3     [12-14-19 @ 02:00]      Phos  2.4     [12-14-19 @ 02:00]    TPro  5.0  /  Alb  2.3  /  TBili  0.8  /  DBili  0.3  /  AST  22  /  ALT  30  /  AlkPhos  52  [12-14-19 @ 02:00]    PT/INR: PT 12.5 , INR 1.09       [12-14-19 @ 02:00]  PTT: 28.4       [12-14-19 @ 02:00]      Blood Gas Calcium, Ionized - Venous: 1.12 mmoL/L (12-14-19 @ 01:43)  Blood Gas Calcium, Ionized - Venous: 1.02 mmoL/L (12-13-19 @ 20:11)  Blood Gas Calcium, Ionized - Venous: 1.12 mmoL/L (12-13-19 @ 01:37)    Glucose, Serum: 113 mg/dL (12-14-19 @ 02:00)  Glucose, Serum: 109 mg/dL (12-13-19 @ 13:55)    Prealbumin, Serum: 5 mg/dL (12-13-19 @ 07:46)    Triglycerides      12-13-19 @ 07:01  -  12-14-19 @ 07:00  --------------------------------------------------------  IN: 3091.5 mL / OUT: 5920 mL / NET: -2828.5 mL    12-14-19 @ 07:01  -  12-14-19 @ 09:39  --------------------------------------------------------  IN: 100 mL / OUT: 1280 mL / NET: -1180 mL        VITALS/PHYSICAL EXAM  --------------------------------------------------------------------------------  T(C): 37.4 (12-14-19 @ 07:00), Max: 37.4 (12-14-19 @ 07:00)  HR: 89 (12-14-19 @ 08:00) (75 - 95)  BP: 141/76 (12-14-19 @ 08:00) (119/59 - 167/66)  RR: 33 (12-14-19 @ 08:00) (13 - 39)  SpO2: 96% (12-14-19 @ 08:00) (90% - 100%)  Wt(kg): --    PHYSICAL EXAM  --------------------------------------------------------------------------------  	Gen: guarded but stable, A&Ox3, NC O2 (+)NGT  	HEENT: NC/AT, PERRL,              Neck: trachea midline  	Chest: non labored breathing.   	Abd: softly distended, appropriately tender, midline incisions clean and dry                       (+)ostomy pink viable- mustard yellow drainage, no flatus                        LLQ JOVANNA w/ serosanguinous drainage              Extrem: WWP  	Skin: Warm, without rashes, good turgor [facility] NUTRITION SUPPORT--  Attending/ PA FOLLOW UP NOTE      24 hour events/subjective: TPN started yesterday, tolerated without issues, pt denied any palpitations, CP nor SOB. Pt denies any nausea, vomiting or abdominal pain. Plan for trickle TF later today.        PAST HISTORY  --------------------------------------------------------------------------------  No significant changes to PMH, PSH, FHx, SHx, unless otherwise noted    ALLERGIES & MEDICATIONS  --------------------------------------------------------------------------------  Allergies    No Known Allergies    Intolerances      Standing Inpatient Medications  acetaZOLAMIDE Injectable 250 milliGRAM(s) IV Push once  acetylcysteine 20%  Inhalation 4 milliLiter(s) Inhalation every 6 hours  albuterol/ipratropium for Nebulization 3 milliLiter(s) Nebulizer every 6 hours  buDESOnide    Inhalation Suspension 0.5 milliGRAM(s) Inhalation every 12 hours  chlorhexidine 2% Cloths 1 Application(s) Topical <User Schedule>  dextrose 5% + lactated ringers. 1000 milliLiter(s) IV Continuous <Continuous>  enoxaparin Injectable 40 milliGRAM(s) SubCutaneous daily  fat emulsion (Fish Oil and Plant Based) 20% Infusion 10.4 mL/Hr IV Continuous <Continuous>  furosemide   Injectable 20 milliGRAM(s) IV Push once  furosemide   Injectable 20 milliGRAM(s) IV Push once  metoprolol tartrate Injectable 5 milliGRAM(s) IV Push every 4 hours  Parenteral Nutrition - Adult 1 Each TPN Continuous <Continuous>  piperacillin/tazobactam IVPB.. 3.375 Gram(s) IV Intermittent every 8 hours  potassium chloride  10 mEq/100 mL IVPB 10 milliEquivalent(s) IV Intermittent every 1 hour  potassium phosphate IVPB 15 milliMole(s) IV Intermittent once    PRN Inpatient Medications  acetaminophen  IVPB .. 1000 milliGRAM(s) IV Intermittent every 6 hours PRN      REVIEW OF SYSTEMS  --------------------------------------------------------------------------------  Gen: as per HPI  Skin: No rashes  Head/Eyes/Ears/Mouth: No headache;No sore throat  Respiratory: No dyspnea, cough,   CV: No chest pain, PND, orthopnea  GI: as per HPI  : No increased frequency, dysuria, hematuria, nocturia  MSK: No joint pain/swelling; no back pain; no edema  Neuro: No dizziness/lightheadedness, weakness, seizures, numbness, tingling  Psych: No significant nervousness, anxiety, stress, depression    All other systems were reviewed and are negative, except as noted.      LABS/STUDIES  --------------------------------------------------------------------------------              11.6   16.08 >-----------<  203      [12-14-19 @ 02:00]              35.7     142  |  104  |  6   ----------------------------<  113      [12-14-19 @ 02:00]  3.3   |  28  |  0.62        Ca     8.0     [12-14-19 @ 02:00]      Mg     2.3     [12-14-19 @ 02:00]      Phos  2.4     [12-14-19 @ 02:00]    TPro  5.0  /  Alb  2.3  /  TBili  0.8  /  DBili  0.3  /  AST  22  /  ALT  30  /  AlkPhos  52  [12-14-19 @ 02:00]    PT/INR: PT 12.5 , INR 1.09       [12-14-19 @ 02:00]  PTT: 28.4       [12-14-19 @ 02:00]      Blood Gas Calcium, Ionized - Venous: 1.12 mmoL/L (12-14-19 @ 01:43)  Blood Gas Calcium, Ionized - Venous: 1.02 mmoL/L (12-13-19 @ 20:11)  Blood Gas Calcium, Ionized - Venous: 1.12 mmoL/L (12-13-19 @ 01:37)    Glucose, Serum: 113 mg/dL (12-14-19 @ 02:00)  Glucose, Serum: 109 mg/dL (12-13-19 @ 13:55)    Prealbumin, Serum: 5 mg/dL (12-13-19 @ 07:46)    Triglycerides      12-13-19 @ 07:01  -  12-14-19 @ 07:00  --------------------------------------------------------  IN: 3091.5 mL / OUT: 5920 mL / NET: -2828.5 mL    12-14-19 @ 07:01  -  12-14-19 @ 09:39  --------------------------------------------------------  IN: 100 mL / OUT: 1280 mL / NET: -1180 mL        VITALS/PHYSICAL EXAM  --------------------------------------------------------------------------------  T(C): 37.4 (12-14-19 @ 07:00), Max: 37.4 (12-14-19 @ 07:00)  HR: 89 (12-14-19 @ 08:00) (75 - 95)  BP: 141/76 (12-14-19 @ 08:00) (119/59 - 167/66)  RR: 33 (12-14-19 @ 08:00) (13 - 39)  SpO2: 96% (12-14-19 @ 08:00) (90% - 100%)  Wt(kg): --    PHYSICAL EXAM  --------------------------------------------------------------------------------  	Gen: guarded but stable, A&Ox3, NC O2 (+)NGT  	HEENT: NC/AT, PERRL,              Neck: trachea midline  	Chest: non labored breathing.   	Abd: softly distended, appropriately tender, midline incisions clean and dry                       (+)ostomy pink viable- mustard yellow drainage, no flatus                        LLQ JOVANNA w/ serosanguinous drainage              Extrem: WWP  	Skin: Warm, without rashes, good turgor  .  .  .  .

## 2019-12-14 NOTE — PROGRESS NOTE ADULT - SUBJECTIVE AND OBJECTIVE BOX
HISTORY    74 year old male with a past medical history of COPD and EtOH dependence who presented today with abdominal pain, nausea, hematemesis, and poor PO intake for ~2-3 days. Labs significant for an CHI w/ Cr 2.74 and lactate of 9.4. He was also notably tachycardic to the 110s and hypotensive w/ SBP in the 70s. He was given 2 units of PRBCs and 2 L of LR in the ED due to concern for upper GI bleeding. Imaging revealed high grade SBO in the RLQ. Patient was taken to the OR emergently for an exploratory laparotomy, lysis of adhesions, decompression of bowel via enterotomy w/ primary repair, and Abthera VAC placement. Of note, the distal 50% of the bowel appeared dusky but viable. He required vasopressor support with phenylephrine and vasopressin infusions. He received 3000 mL of crystalloid w/ EBL of 10 mL and UOP of 25 mL. Patient was left intubated at the end of the case so SICU was consulted for hemodynamic monitoring. Taken to the OR on 12/6 and underwent Ex-lap with MIRYAM, enterotomy for bowel decompression and Abthera VAC placement. Taken back to the OR on 12/8 and underwent take down of Abthera, washout and re-application of the Abthera vac. Went back to OR on 12/10 night for colectomy.    24 HOUR EVENTS:  - Central line was DC'ed, now has two peripheral IVs  - PICC line to be placed tomorrow for TPN   - Got Diamox x1 during the day     SUBJECTIVE/ROS: Endorsing some pain at the site of previous central line, no other complaints at this time,   [x] A ten-point review of systems was otherwise negative except as noted.  [ ] Due to altered mental status/intubation, subjective information were not able to be obtained from the patient. History was obtained, to the extent possible, from review of the chart and collateral sources of information.      NEURO  RASS:     GCS:     CAM ICU:  Exam: A&Ox3, at baseline mentation   Meds: acetaminophen  IVPB .. 1000 milliGRAM(s) IV Intermittent every 6 hours PRN Moderate Pain (4 - 6)    [x] Adequacy of sedation and pain control has been assessed and adjusted      RESPIRATORY  RR: 28 (12-14-19 @ 00:00) (13 - 39)  SpO2: 96% (12-14-19 @ 00:15) (90% - 100%)  Wt(kg): --  Exam: unlabored, clear to auscultation bilaterally  Mechanical Ventilation:   ABG - ( 12 Dec 2019 01:27 )  pH: 7.39  /  pCO2: 46    /  pO2: 138   / HCO3: 27    / Base Excess: 2.1   /  SaO2: 99      Lactate: x                [ ] Extubation Readiness Assessed  Meds: acetylcysteine 20%  Inhalation 4 milliLiter(s) Inhalation every 6 hours  albuterol/ipratropium for Nebulization 3 milliLiter(s) Nebulizer every 6 hours  buDESOnide    Inhalation Suspension 0.5 milliGRAM(s) Inhalation every 12 hours        CARDIOVASCULAR  HR: 75 (12-14-19 @ 00:15) (75 - 134)  BP: 147/65 (12-14-19 @ 00:00) (119/59 - 167/66)  BP(mean): 93 (12-14-19 @ 00:00) (84 - 110)  ABP: --  ABP(mean): --  Wt(kg): --  CVP(cm H2O): --  VBG - ( 13 Dec 2019 20:11 )  pH: 7.47  /  pCO2: 49    /  pO2: 28    / HCO3: 36    / Base Excess: 10.6  /  SaO2: 51     Lactate: 1.2                Exam: RRR, s1s2   Perfusion     [x]Adequate   [ ]Inadequate  Mentation   [x]Normal       [ ]Reduced  Extremities  [x]Warm         [ ]Cool  Volume Status [ ]Hypervolemic [x]Euvolemic [ ]Hypovolemic  Meds: acetaZOLAMIDE Injectable 250 milliGRAM(s) IV Push every 12 hours  metoprolol tartrate Injectable 5 milliGRAM(s) IV Push every 4 hours        GI/NUTRITION  Exam: Some ostomy output, rest to the abd soft non tender    GENITOURINARY  I&O's Detail    12-12 @ 07:01  -  12-13 @ 07:00  --------------------------------------------------------  IN:    dextrose 5% + lactated ringers.: 1210 mL    IV PiggyBack: 695.8 mL    Solution: 475 mL  Total IN: 2380.8 mL    OUT:    Bulb: 570 mL    Emesis: 200 mL    Ileostomy: 350 mL    Indwelling Catheter - Urethral: 1260 mL    Nasoenteral Tube: 1150 mL    Voided: 700 mL  Total OUT: 4230 mL    Total NET: -1849.2 mL      12-13 @ 07:01  -  12-14 @ 00:36  --------------------------------------------------------  IN:    dextrose 5% + lactated ringers.: 850 mL    IV PiggyBack: 716.5 mL    Solution: 350 mL    Solution: 200 mL  Total IN: 2116.5 mL    OUT:    Bulb: 270 mL    Ileostomy: 650 mL    Incontinent per Condom Catheter: 2400 mL    Nasoenteral Tube: 400 mL  Total OUT: 3720 mL    Total NET: -1603.5 mL          12-13    148<H>  |  104  |  7   ----------------------------<  109<H>  3.6   |  33<H>  |  0.52    Ca    7.6<L>      13 Dec 2019 13:55  Phos  3.1     12-13  Mg     1.9     12-13    TPro  5.2<L>  /  Alb  2.5<L>  /  TBili  0.7  /  DBili  0.2  /  AST  29  /  ALT  31  /  AlkPhos  57  12-13    [ ] Martinez catheter, indication: N/A  Meds: dextrose 5% + lactated ringers. 1000 milliLiter(s) IV Continuous <Continuous>  fat emulsion (Fish Oil and Plant Based) 20% Infusion 10.4 mL/Hr IV Continuous <Continuous>  Parenteral Nutrition - Adult 1 Each TPN Continuous <Continuous>        HEMATOLOGIC  Meds: enoxaparin Injectable 40 milliGRAM(s) SubCutaneous daily    [x] VTE Prophylaxis                        11.9   18.37 )-----------( 186      ( 13 Dec 2019 13:55 )             35.7     PT/INR - ( 13 Dec 2019 01:44 )   PT: 13.1 sec;   INR: 1.13 ratio         PTT - ( 13 Dec 2019 01:44 )  PTT:30.0 sec  Transfusion     [ ] PRBC   [ ] Platelets   [ ] FFP   [ ] Cryoprecipitate      INFECTIOUS DISEASES  T(C): 37 (12-13-19 @ 23:00), Max: 37.7 (12-13-19 @ 03:00)  Wt(kg): --  WBC Count: 18.37 K/uL (12-13 @ 13:55)  WBC Count: 15.99 K/uL (12-13 @ 01:44)    Recent Cultures:    Meds: piperacillin/tazobactam IVPB.. 3.375 Gram(s) IV Intermittent every 8 hours        ENDOCRINE  Capillary Blood Glucose    Meds:       ACCESS DEVICES:  [ ] Peripheral IV  [ ] Central Venous Line	[ ] R	[ ] L	[ ] IJ	[ ] Fem	[ ] SC	Placed:   [x] Arterial Line		[ ] R	[x] L	[ ] Fem	[x] Rad	[ ] Ax	Placed:   [ ] PICC:					[ ] Mediport  [ ] Urinary Catheter, Date Placed:   [ ] Necessity of urinary, arterial, and venous catheters discussed    OTHER MEDICATIONS:  chlorhexidine 2% Cloths 1 Application(s) Topical <User Schedule>

## 2019-12-14 NOTE — PROGRESS NOTE ADULT - SUBJECTIVE AND OBJECTIVE BOX
GENERAL SURGERY DAILY PROGRESS NOTE:    24 HOUR EVENTS:  - Central line was DC'ed, now has two peripheral IVs  - PICC line to be placed tomorrow for TPN   - Got Diamox x1 during the day     Subjective:  Patient seen and examined this am.   On room air  Denies pain  OOB in chair  Ostomy with gas and stool in bag  Counselled NGT     OBJECTIVE:    Exam:  Gen: NAD, sitting in chair  Resp: Mask  Abd: NGT. Soft, ND. Markedly decreased tenderness. VAC in place. No erythema/induration. Dressing changed Ostomy  : Martinez clear yellow  Ext: No edema, WWP wrist restraints ICDs    T(C): 37 (12-14-19 @ 11:00), Max: 37.4 (12-14-19 @ 07:00)  HR: 86 (12-14-19 @ 14:00) (74 - 94)  BP: 117/63 (12-14-19 @ 14:00) (110/55 - 167/66)  RR: 29 (12-14-19 @ 14:00) (22 - 36)  SpO2: 94% (12-14-19 @ 14:00) (90% - 98%)  Wt(kg): --    LABS:                        11.6   16.08 )-----------( 203      ( 14 Dec 2019 02:00 )             35.7     12-14    142  |  104  |  6<L>  ----------------------------<  113<H>  3.3<L>   |  28  |  0.62    Ca    8.0<L>      14 Dec 2019 02:00  Phos  2.4     12-14  Mg     2.3     12-14    TPro  5.0<L>  /  Alb  2.3<L>  /  TBili  0.8  /  DBili  0.3<H>  /  AST  22  /  ALT  30  /  AlkPhos  52  12-14    PT/INR - ( 14 Dec 2019 02:00 )   PT: 12.5 sec;   INR: 1.09 ratio         PTT - ( 14 Dec 2019 02:00 )  PTT:28.4 sec        CAPILLARY BLOOD GLUCOSE      POCT Blood Glucose.: 110 mg/dL (14 Dec 2019 05:48)  POCT Blood Glucose.: 92 mg/dL (13 Dec 2019 23:23)  POCT Blood Glucose.: 110 mg/dL (13 Dec 2019 18:27)      Is&O's    12-13-19 @ 07:01  -  12-14-19 @ 07:00  --------------------------------------------------------  IN: 3091.5 mL / OUT: 5920 mL / NET: -2828.5 mL    12-14-19 @ 07:01  -  12-14-19 @ 14:10  --------------------------------------------------------  IN: 460 mL / OUT: 2520 mL / NET: -2060 mL

## 2019-12-14 NOTE — PROGRESS NOTE ADULT - ATTENDING COMMENTS
Patient seen and examined  He is awake, and alert  His abdomen is soft, minimal distension, appropriately tender  Ileostomy edematous, and viable, with >1000mL of output over 24 hours    - Appreciate continued SICU care  - Trickle feeds through NGT

## 2019-12-14 NOTE — PROGRESS NOTE ADULT - PROBLEM SELECTOR PLAN 1
s/p ex lap, MIRYAM, closure of enterotomy, abthera vac placement  s/p washout  For PICC line placement   Stable

## 2019-12-14 NOTE — PROGRESS NOTE ADULT - ATTENDING COMMENTS
Awake and alert  Resolving effusion congestion, continue diuresis with Lasix and addition of Diamox for metabolic alkalosis, replace K agressively, will repeak lab to assess  PICC line, TPN   Good ileostomy output, surgery agree for trophic feed via NGT with checking frequent residual  COPD meds  OOB mobilization

## 2019-12-14 NOTE — PROGRESS NOTE ADULT - ASSESSMENT
75 y/o M presenting with septic shock and high grade SBO s/p exploratory laparotomy, lysis of adhesions, decompression of bowel via enterotomy w/ primary repair, and Abthera VAC placement on 12/6; s/p take down of Abthera, washout and re-application of the Abthera vac on 12/8. Now s/p SBR and end ileostomy on 12/10.     PLAN:    Neuro: Patient extubated on 12/11, off sedation now.   - Pain control with IV Tylenol    Resp: acute respiratory failure upon initial presentation  - Trend ABG and lactate q12  - Duonebs    CV: septic shock requiring vasopressor support  - Off vasopressor  - Persistently tachycardic to 100~120's, metoprolol frequency increased  - Lactate 1.1    GI: SBO s/p exploratory laparotomy, lysis of adhesions, decompression of bowel via enterotomy w/ primary repair, and Abthera VAC placement. \  - Patient now s/p OR  for SBR with end ileostomy on 12/10. Abdomen closed.   - Monitor NGT output, not tolerating feeds at this time   - Plan for TPN through the PICC line tomorrow   - Monitor ostomy output  - Protonix for stress ulcer prophylaxis    Renal: CHI   - D5/LR @ 100, DC after starting tube feeds    - Martinez, strict I&Os    Heme: no acute issues  - Lovenox for VTE prophylaxis  - Venodynes    ID: septic shock 2/2 intra abdominal infection  - Continue Zosyn    Endo: no acute issues  - Monitor glucose on BMP

## 2019-12-14 NOTE — PROGRESS NOTE ADULT - SUBJECTIVE AND OBJECTIVE BOX
Subjective: Patient seen and examined. No new events except as noted.   remains in ICU   Central line was DC'ed  PICC line to be placed tomorrow for TPN       REVIEW OF SYSTEMS:    CONSTITUTIONAL: + weakness, fevers or chills  EYES/ENT: No visual changes;  No vertigo or throat pain   NECK: No pain or stiffness  RESPIRATORY: No cough, wheezing, hemoptysis; No shortness of breath  CARDIOVASCULAR: No chest pain or palpitations  GASTROINTESTINAL: No abdominal or epigastric pain. No nausea, vomiting, or hematemesis; No diarrhea or constipation. No melena or hematochezia.  GENITOURINARY: No dysuria, frequency or hematuria  NEUROLOGICAL: No numbness or weakness  SKIN: No itching, burning, rashes, or lesions   All other review of systems is negative unless indicated above.    MEDICATIONS:  MEDICATIONS  (STANDING):  acetylcysteine 20%  Inhalation 4 milliLiter(s) Inhalation every 6 hours  albuterol/ipratropium for Nebulization 3 milliLiter(s) Nebulizer every 6 hours  buDESOnide    Inhalation Suspension 0.5 milliGRAM(s) Inhalation every 12 hours  chlorhexidine 2% Cloths 1 Application(s) Topical <User Schedule>  chlorhexidine 4% Liquid 1 Application(s) Topical <User Schedule>  dextrose 5% + lactated ringers. 1000 milliLiter(s) (10 mL/Hr) IV Continuous <Continuous>  enoxaparin Injectable 40 milliGRAM(s) SubCutaneous daily  fat emulsion (Fish Oil and Plant Based) 20% Infusion 10.4 mL/Hr (10.4 mL/Hr) IV Continuous <Continuous>  metoprolol tartrate Injectable 5 milliGRAM(s) IV Push every 4 hours  Parenteral Nutrition - Adult 1 Each (100 mL/Hr) TPN Continuous <Continuous>  Parenteral Nutrition - Adult 1 Each (100 mL/Hr) TPN Continuous <Continuous>  piperacillin/tazobactam IVPB.. 3.375 Gram(s) IV Intermittent every 8 hours      PHYSICAL EXAM:  T(C): 37.2 (12-14-19 @ 15:00), Max: 37.4 (12-14-19 @ 07:00)  HR: 95 (12-14-19 @ 18:00) (74 - 95)  BP: 144/67 (12-14-19 @ 18:00) (110/55 - 167/66)  RR: 22 (12-14-19 @ 18:00) (22 - 33)  SpO2: 98% (12-14-19 @ 18:00) (92% - 98%)  Wt(kg): --  I&O's Summary    13 Dec 2019 07:01  -  14 Dec 2019 07:00  --------------------------------------------------------  IN: 3091.5 mL / OUT: 5920 mL / NET: -2828.5 mL    14 Dec 2019 07:01  -  14 Dec 2019 19:42  --------------------------------------------------------  IN: 1610.8 mL / OUT: 5110 mL / NET: -3499.2 mL          Appearance: Lethargic   HEENT:   Dry oral mucosa RIJ line   Lymphatic: No lymphadenopathy  Cardiovascular: Normal S1 S2, No JVD, No murmurs , Peripheral pulses palpable 2+ bilaterally  Respiratory: Decreased bs   Gastrointestinal:  Appropriately tender, +VAC  Skin: No rashes, No ecchymoses, No cyanosis, warm to touch  Musculoskeletal: Decreased  range of motion and strength  Psychiatry:  mildly sedated   Ext: No edema      LABS:    CARDIAC MARKERS:                                11.0   14.60 )-----------( 235      ( 14 Dec 2019 14:53 )             33.2     12-14    142  |  106  |  8   ----------------------------<  93  3.5   |  27  |  0.61    Ca    7.6<L>      14 Dec 2019 14:53  Phos  3.2     12-14  Mg     1.9     12-14    TPro  5.1<L>  /  Alb  2.4<L>  /  TBili  0.8  /  DBili  0.3<H>  /  AST  22  /  ALT  25  /  AlkPhos  54  12-14    proBNP:   Lipid Profile:   HgA1c: Hemoglobin A1C, Whole Blood: 5.6 % (12-14 @ 05:37)                TELEMETRY: 	  SR  ECG:  	  RADIOLOGY:  < from: Xray Chest 1 View- PORTABLE-Urgent (12.14.19 @ 14:28) >    EXAM:  XR CHEST PORTABLE URGENT 1V                            PROCEDURE DATE:  12/14/2019            INTERPRETATION:  A single chest x-ray was obtained on December 14, 2019.    Indication: Status post PICC line placement.    Impression:    The heart is normal in size. Small left pleural effusion. The right lung   appears to be clear. A right PICC line is in place and the tip is in   superior vena cava. No pneumothorax. NG tube is in the stomach.                    TRUDI DIETZ M.D., ATTENDING RADIOLOGIST  This document has been electronically signed. Dec 14 2019  2:34PM                < end of copied text >    DIAGNOSTIC TESTING:  [ ] Echocardiogram:  [ ]  Catheterization:  [ ] Stress Test:    OTHER:

## 2019-12-14 NOTE — PROGRESS NOTE ADULT - ASSESSMENT
A/P: 74 year old male w/ PMH of COPD and EtOH dependence with PSH/o appy, prostate surgery, & spine surgery  septic shock and high grade SBO s/p exploratory laparotomy, lysis of adhesions, decompression of bowel via enterotomy w/ primary repair, and Abthera VAC placement on 12/6; s/p take down of Abthera, washout and re-application of the Abthera vac on 12/8. Now s/p SBR and end ileostomy on 12/10.   TPN consulted to assist w/ management of pt's nutrition in pt w/ prolonged hospital course who remains NPO w/NGT      TPN to be initiated today in pt w/ Protein-Calorie Malnutrition  Amino Acids 60g, Dextrose 140g, Lipids 25g in 2400mL  Micronutrients: 10ml MVI, 3ml MTE-5  NaCl 40mEq, NaAce 60mEq, NaPhos, 60mMol, KCl 80mEq, Ca 10mEq, Mg 12mEq       TPN recommendations from RD  120 grams amino acids (800ml 15% a.a.)  210 grams dextrose (300ml 70% dex)  50 grams SMOFlipid (250ml 20%IVFE @ 20.8ml/hr x 12 hours)    Total Calories: 1694 gregorio/day (31 gregorio/Kg per dosing wt 54.2Kg)  Total Protein: 120 Gm/day (2.2 Gm/Kg per dosing wt 54.2Kg)  Non-Protein Calories: 1214 gregorio/day (22 gregorio/Kg)  Dextrose Infusion Rate: 2.7 mg/Kg/min  Lipid Infusion Rate: 0.92 Gm/Kg/day; 0.08 Gm/Kg/hr         1. Pt will need a Central line w/dedicated port for only TPN  2. Strict Intake and Output.  3. Weights three times a week  4. Monitor BMP, Mg, Ionized Ca, Phosphorus daily  5. Triglycerides daily for first 3 days of TPN or until stable, then weekly   6. Pre-albumin weekly  7. Hyperglycemia - consider HgA1c       TPN initiated w/  Insulin 5U to manage hyperglycemia      Fingersticks to monitor glucose every 6 hours until stable, may be decreased to twice a day      ISS coverage - to be ordered by primary team  8. HypoPhos & Low K initiate TPN with 60mEq NaPhos & 80mEqKCl to compound concern for Refeeding Syndrome  Continue as per SICU / Surgery, will follow with you, D/w primary team      TPN team, pager [phone]  D/w Ana M Siegel A/P: 74 year old male w/ PMH of COPD and EtOH dependence with PSH/o appy, prostate surgery, & spine surgery  septic shock and high grade SBO s/p exploratory laparotomy, lysis of adhesions, decompression of bowel via enterotomy w/ primary repair, and Abthera VAC placement on 12/6; s/p take down of Abthera, washout and re-application of the Abthera vac on 12/8. Now s/p SBR and end ileostomy on 12/10.   TPN consulted to assist w/ management of pt's nutrition in pt w/ prolonged hospital course who remains NPO w/NGT        1. TPN to be initiated today at 1/2 goal with lipids if PICC placed.  2. Strict Intake and Output.  3. Weights three times a week  4. Monitor BMP, Mg, Ionized Ca, Phosphorus daily  5. Triglycerides daily for first 3 days of TPN or until stable, then weekly   6. Pre-albumin weekly  7. Hyperglycemia - consider HgA1c  TPN initiated w/  Insulin 5U to manage hyperglycemia      Fingersticks to monitor glucose every 6 hours until stable, may be decreased to twice a day      ISS coverage - to be ordered by primary team  8. HypoPhos & Low K initiate TPN with 60mEq NaPhos & 80mEqKCl to compound concern for Refeeding Syndrome  Continue as per SICU / Surgery, will follow with you, D/w primary team, PGy-2  Marlene Mcmullen [id number]      TPN team, pager [phone]  D/w Ana M Siegel A/P: 74 year old male w/ PMH of COPD and EtOH dependence with PSH/o appy, prostate surgery, & spine surgery  septic shock and high grade SBO s/p exploratory laparotomy, lysis of adhesions, decompression of bowel via enterotomy w/ primary repair, and Abthera VAC placement on 12/6; s/p take down of Abthera, washout and re-application of the Abthera vac on 12/8. Now s/p SBR and end ileostomy on 12/10.   TPN consulted to assist w/ management of pt's nutrition in pt w/ prolonged hospital course who remains NPO w/NGT. NGT planned for removal later this AM and will start sips.        1. TPN to goal today, lipids to goal. Pt to start sips later today, after NGT is removed.  Will continue TPN at goal.  2. Strict Intake and Output.  3. Weights three times a week  4. Monitor BMP, Mg, Ionized Ca, Phosphorus daily  5. Triglycerides daily for first 3 days of TPN or until stable, then weekly   6. Pre-albumin weekly  7. Hyperglycemia - consider HgA1c . Keep Insulin at 5U, will check FS to see if necessary to increase tomorrow.  8. HypoPhos - improved  9. Hypokalemia- increase K to 120Meq.      Continue as per SICU / Surgery, will follow with you, D/w primary team, Simran BERMUDEZ.    TPN team, pager [phone]  D/w Ana M Siegel A/P: 74 year old male w/ PMH of COPD and EtOH dependence with PSH/o appy, prostate surgery, & spine surgery  septic shock and high grade SBO s/p exploratory laparotomy, lysis of adhesions, decompression of bowel via enterotomy w/ primary repair, and Abthera VAC placement on 12/6; s/p take down of Abthera, washout and re-application of the Abthera vac on 12/8. Now s/p SBR and end ileostomy on 12/10.   TPN consulted to assist w/ management of pt's nutrition in pt w/ prolonged hospital course who remains NPO w/NGT.     1. TPN at 1/2 today with 1/2 lipids, once PICC placed  2. Strict Intake and Output.  3. Weights three times a week  4. Monitor BMP, Mg, Ionized Ca, Phosphorus daily  5. Triglycerides daily for first 3 days of TPN or until stable, then weekly   6. Pre-albumin weekly  7. Hyperglycemia - consider HgA1c . Keep Insulin at 5U, will check FS to see if necessary to increase tomorrow.  8. HypoPhos - Na Phos at 60mmol  9. Hypokalemia- increase K at 80 Meq.      Continue as per SICU / Surgery, will follow with you,     TPN team, pager [phone]  D/w Ana M Chacko and MU Siegel A/P: 74 year old male w/ PMH of COPD and EtOH dependence with PSH/o appy, prostate surgery, & spine surgery  septic shock and high grade SBO s/p exploratory laparotomy, lysis of adhesions, decompression of bowel via enterotomy w/ primary repair, and Abthera VAC placement on 12/6; s/p take down of Abthera, washout and re-application of the Abthera vac on 12/8. Now s/p SBR and end ileostomy on 12/10.  TPN consulted to assist w/ management of pt's nutrition in pt w/ prolonged hospital course who remains NPO w/NGT.     1. TPN at 1/2 today with 1/2 lipids, once PICC placed  2. Strict Intake and Output.  3. Weights three times a week  4. Monitor BMP, Mg, Ionized Ca, Phosphorus daily  5. Triglycerides daily for first 3 days of TPN or until stable, then weekly   6. Pre-albumin weekly  7. Hyperglycemia - consider HgA1c . Keep Insulin at 5U, will check FS to see if necessary to increase tomorrow.  8. HypoPhos - Na Phos at 60mmol  9. Hypokalemia- increase K at 80 Meq.      Continue as per SICU / Surgery, will follow with you,     TPN team, pager [phone]  D/w Ana M Chacko and MU Siegel

## 2019-12-15 LAB
ALBUMIN SERPL ELPH-MCNC: 2.4 G/DL — LOW (ref 3.3–5)
ALBUMIN SERPL ELPH-MCNC: 3 G/DL — LOW (ref 3.3–5)
ALP SERPL-CCNC: 51 U/L — SIGNIFICANT CHANGE UP (ref 40–120)
ALP SERPL-CCNC: 65 U/L — SIGNIFICANT CHANGE UP (ref 40–120)
ALT FLD-CCNC: 24 U/L — SIGNIFICANT CHANGE UP (ref 10–45)
ALT FLD-CCNC: 30 U/L — SIGNIFICANT CHANGE UP (ref 10–45)
ANION GAP SERPL CALC-SCNC: 10 MMOL/L — SIGNIFICANT CHANGE UP (ref 5–17)
ANION GAP SERPL CALC-SCNC: 12 MMOL/L — SIGNIFICANT CHANGE UP (ref 5–17)
ANION GAP SERPL CALC-SCNC: 13 MMOL/L — SIGNIFICANT CHANGE UP (ref 5–17)
ANION GAP SERPL CALC-SCNC: 16 MMOL/L — SIGNIFICANT CHANGE UP (ref 5–17)
APTT BLD: 29.4 SEC — SIGNIFICANT CHANGE UP (ref 27.5–36.3)
AST SERPL-CCNC: 18 U/L — SIGNIFICANT CHANGE UP (ref 10–40)
AST SERPL-CCNC: 23 U/L — SIGNIFICANT CHANGE UP (ref 10–40)
BILIRUB DIRECT SERPL-MCNC: 0.3 MG/DL — HIGH (ref 0–0.2)
BILIRUB DIRECT SERPL-MCNC: 0.4 MG/DL — HIGH (ref 0–0.2)
BILIRUB INDIRECT FLD-MCNC: 0.4 MG/DL — SIGNIFICANT CHANGE UP (ref 0.2–1)
BILIRUB INDIRECT FLD-MCNC: 0.5 MG/DL — SIGNIFICANT CHANGE UP (ref 0.2–1)
BILIRUB SERPL-MCNC: 0.7 MG/DL — SIGNIFICANT CHANGE UP (ref 0.2–1.2)
BILIRUB SERPL-MCNC: 0.9 MG/DL — SIGNIFICANT CHANGE UP (ref 0.2–1.2)
BUN SERPL-MCNC: 10 MG/DL — SIGNIFICANT CHANGE UP (ref 7–23)
BUN SERPL-MCNC: 13 MG/DL — SIGNIFICANT CHANGE UP (ref 7–23)
BUN SERPL-MCNC: 15 MG/DL — SIGNIFICANT CHANGE UP (ref 7–23)
BUN SERPL-MCNC: 9 MG/DL — SIGNIFICANT CHANGE UP (ref 7–23)
CALCIUM SERPL-MCNC: 7.7 MG/DL — LOW (ref 8.4–10.5)
CALCIUM SERPL-MCNC: 8.4 MG/DL — SIGNIFICANT CHANGE UP (ref 8.4–10.5)
CALCIUM SERPL-MCNC: 8.4 MG/DL — SIGNIFICANT CHANGE UP (ref 8.4–10.5)
CALCIUM SERPL-MCNC: 8.5 MG/DL — SIGNIFICANT CHANGE UP (ref 8.4–10.5)
CHLORIDE SERPL-SCNC: 101 MMOL/L — SIGNIFICANT CHANGE UP (ref 96–108)
CHLORIDE SERPL-SCNC: 102 MMOL/L — SIGNIFICANT CHANGE UP (ref 96–108)
CHLORIDE SERPL-SCNC: 104 MMOL/L — SIGNIFICANT CHANGE UP (ref 96–108)
CHLORIDE SERPL-SCNC: 94 MMOL/L — LOW (ref 96–108)
CK MB BLD-MCNC: 3.7 % — HIGH (ref 0–3.5)
CK MB CFR SERPL CALC: 3.1 NG/ML — SIGNIFICANT CHANGE UP (ref 0–6.7)
CK SERPL-CCNC: 83 U/L — SIGNIFICANT CHANGE UP (ref 30–200)
CO2 SERPL-SCNC: 22 MMOL/L — SIGNIFICANT CHANGE UP (ref 22–31)
CO2 SERPL-SCNC: 25 MMOL/L — SIGNIFICANT CHANGE UP (ref 22–31)
CO2 SERPL-SCNC: 25 MMOL/L — SIGNIFICANT CHANGE UP (ref 22–31)
CO2 SERPL-SCNC: 26 MMOL/L — SIGNIFICANT CHANGE UP (ref 22–31)
CREAT SERPL-MCNC: 0.63 MG/DL — SIGNIFICANT CHANGE UP (ref 0.5–1.3)
CREAT SERPL-MCNC: 0.67 MG/DL — SIGNIFICANT CHANGE UP (ref 0.5–1.3)
CREAT SERPL-MCNC: 0.73 MG/DL — SIGNIFICANT CHANGE UP (ref 0.5–1.3)
CREAT SERPL-MCNC: 0.79 MG/DL — SIGNIFICANT CHANGE UP (ref 0.5–1.3)
GAS PNL BLDA: SIGNIFICANT CHANGE UP
GLUCOSE BLDC GLUCOMTR-MCNC: 109 MG/DL — HIGH (ref 70–99)
GLUCOSE BLDC GLUCOMTR-MCNC: 119 MG/DL — HIGH (ref 70–99)
GLUCOSE BLDC GLUCOMTR-MCNC: 120 MG/DL — HIGH (ref 70–99)
GLUCOSE BLDC GLUCOMTR-MCNC: 134 MG/DL — HIGH (ref 70–99)
GLUCOSE BLDC GLUCOMTR-MCNC: 135 MG/DL — HIGH (ref 70–99)
GLUCOSE BLDC GLUCOMTR-MCNC: 145 MG/DL — HIGH (ref 70–99)
GLUCOSE BLDC GLUCOMTR-MCNC: 153 MG/DL — HIGH (ref 70–99)
GLUCOSE SERPL-MCNC: 114 MG/DL — HIGH (ref 70–99)
GLUCOSE SERPL-MCNC: 141 MG/DL — HIGH (ref 70–99)
GLUCOSE SERPL-MCNC: 143 MG/DL — HIGH (ref 70–99)
GLUCOSE SERPL-MCNC: 579 MG/DL — CRITICAL HIGH (ref 70–99)
HCT VFR BLD CALC: 34.4 % — LOW (ref 39–50)
HCT VFR BLD CALC: 42.2 % — SIGNIFICANT CHANGE UP (ref 39–50)
HGB BLD-MCNC: 10.9 G/DL — LOW (ref 13–17)
HGB BLD-MCNC: 13.4 G/DL — SIGNIFICANT CHANGE UP (ref 13–17)
INR BLD: 1.11 RATIO — SIGNIFICANT CHANGE UP (ref 0.88–1.16)
MAGNESIUM SERPL-MCNC: 2 MG/DL — SIGNIFICANT CHANGE UP (ref 1.6–2.6)
MAGNESIUM SERPL-MCNC: 2.6 MG/DL — SIGNIFICANT CHANGE UP (ref 1.6–2.6)
MCHC RBC-ENTMCNC: 29.5 PG — SIGNIFICANT CHANGE UP (ref 27–34)
MCHC RBC-ENTMCNC: 29.7 PG — SIGNIFICANT CHANGE UP (ref 27–34)
MCHC RBC-ENTMCNC: 31.7 GM/DL — LOW (ref 32–36)
MCHC RBC-ENTMCNC: 31.8 GM/DL — LOW (ref 32–36)
MCV RBC AUTO: 93 FL — SIGNIFICANT CHANGE UP (ref 80–100)
MCV RBC AUTO: 93.7 FL — SIGNIFICANT CHANGE UP (ref 80–100)
NRBC # BLD: 0 /100 WBCS — SIGNIFICANT CHANGE UP (ref 0–0)
NRBC # BLD: 0 /100 WBCS — SIGNIFICANT CHANGE UP (ref 0–0)
PHOSPHATE SERPL-MCNC: 14.7 MG/DL — HIGH (ref 2.5–4.5)
PHOSPHATE SERPL-MCNC: 3.9 MG/DL — SIGNIFICANT CHANGE UP (ref 2.5–4.5)
PHOSPHATE SERPL-MCNC: 4.3 MG/DL — SIGNIFICANT CHANGE UP (ref 2.5–4.5)
PHOSPHATE SERPL-MCNC: 6.6 MG/DL — HIGH (ref 2.5–4.5)
PLATELET # BLD AUTO: 248 K/UL — SIGNIFICANT CHANGE UP (ref 150–400)
PLATELET # BLD AUTO: 333 K/UL — SIGNIFICANT CHANGE UP (ref 150–400)
POTASSIUM SERPL-MCNC: 3.4 MMOL/L — LOW (ref 3.5–5.3)
POTASSIUM SERPL-MCNC: 3.8 MMOL/L — SIGNIFICANT CHANGE UP (ref 3.5–5.3)
POTASSIUM SERPL-MCNC: 4.2 MMOL/L — SIGNIFICANT CHANGE UP (ref 3.5–5.3)
POTASSIUM SERPL-MCNC: 7.5 MMOL/L — CRITICAL HIGH (ref 3.5–5.3)
POTASSIUM SERPL-SCNC: 3.4 MMOL/L — LOW (ref 3.5–5.3)
POTASSIUM SERPL-SCNC: 3.8 MMOL/L — SIGNIFICANT CHANGE UP (ref 3.5–5.3)
POTASSIUM SERPL-SCNC: 4.2 MMOL/L — SIGNIFICANT CHANGE UP (ref 3.5–5.3)
POTASSIUM SERPL-SCNC: 7.5 MMOL/L — CRITICAL HIGH (ref 3.5–5.3)
PROT SERPL-MCNC: 5.1 G/DL — LOW (ref 6–8.3)
PROT SERPL-MCNC: 6.3 G/DL — SIGNIFICANT CHANGE UP (ref 6–8.3)
PROTHROM AB SERPL-ACNC: 12.8 SEC — SIGNIFICANT CHANGE UP (ref 10–12.9)
RBC # BLD: 3.67 M/UL — LOW (ref 4.2–5.8)
RBC # BLD: 4.54 M/UL — SIGNIFICANT CHANGE UP (ref 4.2–5.8)
RBC # FLD: 13.7 % — SIGNIFICANT CHANGE UP (ref 10.3–14.5)
RBC # FLD: 13.9 % — SIGNIFICANT CHANGE UP (ref 10.3–14.5)
SODIUM SERPL-SCNC: 132 MMOL/L — LOW (ref 135–145)
SODIUM SERPL-SCNC: 139 MMOL/L — SIGNIFICANT CHANGE UP (ref 135–145)
SODIUM SERPL-SCNC: 139 MMOL/L — SIGNIFICANT CHANGE UP (ref 135–145)
SODIUM SERPL-SCNC: 140 MMOL/L — SIGNIFICANT CHANGE UP (ref 135–145)
TRIGL SERPL-MCNC: 122 MG/DL — SIGNIFICANT CHANGE UP (ref 10–149)
TROPONIN T, HIGH SENSITIVITY RESULT: 27 NG/L — SIGNIFICANT CHANGE UP (ref 0–51)
WBC # BLD: 14.3 K/UL — HIGH (ref 3.8–10.5)
WBC # BLD: 15.23 K/UL — HIGH (ref 3.8–10.5)
WBC # FLD AUTO: 14.3 K/UL — HIGH (ref 3.8–10.5)
WBC # FLD AUTO: 15.23 K/UL — HIGH (ref 3.8–10.5)

## 2019-12-15 PROCEDURE — 99291 CRITICAL CARE FIRST HOUR: CPT | Mod: 25

## 2019-12-15 PROCEDURE — 99232 SBSQ HOSP IP/OBS MODERATE 35: CPT

## 2019-12-15 PROCEDURE — 74018 RADEX ABDOMEN 1 VIEW: CPT | Mod: 26

## 2019-12-15 PROCEDURE — 71045 X-RAY EXAM CHEST 1 VIEW: CPT | Mod: 26,77

## 2019-12-15 PROCEDURE — 71045 X-RAY EXAM CHEST 1 VIEW: CPT | Mod: 26

## 2019-12-15 PROCEDURE — 32551 INSERTION OF CHEST TUBE: CPT

## 2019-12-15 RX ORDER — AMIODARONE HYDROCHLORIDE 400 MG/1
1 TABLET ORAL
Qty: 900 | Refills: 0 | Status: DISCONTINUED | OUTPATIENT
Start: 2019-12-15 | End: 2019-12-16

## 2019-12-15 RX ORDER — FUROSEMIDE 40 MG
20 TABLET ORAL ONCE
Refills: 0 | Status: COMPLETED | OUTPATIENT
Start: 2019-12-15 | End: 2019-12-15

## 2019-12-15 RX ORDER — POTASSIUM CHLORIDE 20 MEQ
20 PACKET (EA) ORAL ONCE
Refills: 0 | Status: COMPLETED | OUTPATIENT
Start: 2019-12-15 | End: 2019-12-15

## 2019-12-15 RX ORDER — HALOPERIDOL DECANOATE 100 MG/ML
2.5 INJECTION INTRAMUSCULAR ONCE
Refills: 0 | Status: COMPLETED | OUTPATIENT
Start: 2019-12-15 | End: 2019-12-15

## 2019-12-15 RX ORDER — I.V. FAT EMULSION 20 G/100ML
20.8 EMULSION INTRAVENOUS
Qty: 50 | Refills: 0 | Status: DISCONTINUED | OUTPATIENT
Start: 2019-12-15 | End: 2019-12-15

## 2019-12-15 RX ORDER — ALBUMIN HUMAN 25 %
250 VIAL (ML) INTRAVENOUS ONCE
Refills: 0 | Status: COMPLETED | OUTPATIENT
Start: 2019-12-15 | End: 2019-12-15

## 2019-12-15 RX ORDER — ELECTROLYTE SOLUTION,INJ
1 VIAL (ML) INTRAVENOUS
Refills: 0 | Status: DISCONTINUED | OUTPATIENT
Start: 2019-12-15 | End: 2019-12-15

## 2019-12-15 RX ORDER — I.V. FAT EMULSION 20 G/100ML
20.8 EMULSION INTRAVENOUS
Qty: 50 | Refills: 0 | Status: DISCONTINUED | OUTPATIENT
Start: 2019-12-15 | End: 2019-12-16

## 2019-12-15 RX ORDER — FENTANYL CITRATE 50 UG/ML
50 INJECTION INTRAVENOUS ONCE
Refills: 0 | Status: DISCONTINUED | OUTPATIENT
Start: 2019-12-15 | End: 2019-12-15

## 2019-12-15 RX ORDER — POTASSIUM CHLORIDE 20 MEQ
20 PACKET (EA) ORAL
Refills: 0 | Status: COMPLETED | OUTPATIENT
Start: 2019-12-15 | End: 2019-12-15

## 2019-12-15 RX ORDER — PHENYLEPHRINE HYDROCHLORIDE 10 MG/ML
0.5 INJECTION INTRAVENOUS
Qty: 40 | Refills: 0 | Status: DISCONTINUED | OUTPATIENT
Start: 2019-12-15 | End: 2019-12-16

## 2019-12-15 RX ORDER — AMIODARONE HYDROCHLORIDE 400 MG/1
150 TABLET ORAL ONCE
Refills: 0 | Status: COMPLETED | OUTPATIENT
Start: 2019-12-15 | End: 2019-12-15

## 2019-12-15 RX ADMIN — Medication 3 MILLILITER(S): at 11:10

## 2019-12-15 RX ADMIN — Medication 5 MILLIGRAM(S): at 00:09

## 2019-12-15 RX ADMIN — HALOPERIDOL DECANOATE 2.5 MILLIGRAM(S): 100 INJECTION INTRAMUSCULAR at 21:21

## 2019-12-15 RX ADMIN — Medication 5 MILLIGRAM(S): at 08:18

## 2019-12-15 RX ADMIN — Medication 400 MILLIGRAM(S): at 22:48

## 2019-12-15 RX ADMIN — ENOXAPARIN SODIUM 40 MILLIGRAM(S): 100 INJECTION SUBCUTANEOUS at 13:49

## 2019-12-15 RX ADMIN — Medication 5 MILLIGRAM(S): at 16:29

## 2019-12-15 RX ADMIN — I.V. FAT EMULSION 20.8 ML/HR: 20 EMULSION INTRAVENOUS at 19:54

## 2019-12-15 RX ADMIN — AMIODARONE HYDROCHLORIDE 600 MILLIGRAM(S): 400 TABLET ORAL at 21:30

## 2019-12-15 RX ADMIN — Medication 4 MILLILITER(S): at 01:03

## 2019-12-15 RX ADMIN — CHLORHEXIDINE GLUCONATE 1 APPLICATION(S): 213 SOLUTION TOPICAL at 05:57

## 2019-12-15 RX ADMIN — Medication 1 EACH: at 19:54

## 2019-12-15 RX ADMIN — Medication 3 MILLILITER(S): at 23:49

## 2019-12-15 RX ADMIN — Medication 125 MILLILITER(S): at 21:42

## 2019-12-15 RX ADMIN — AMIODARONE HYDROCHLORIDE 33.33 MG/MIN: 400 TABLET ORAL at 22:44

## 2019-12-15 RX ADMIN — PHENYLEPHRINE HYDROCHLORIDE 10.16 MICROGRAM(S)/KG/MIN: 10 INJECTION INTRAVENOUS at 19:55

## 2019-12-15 RX ADMIN — Medication 1 EACH: at 17:45

## 2019-12-15 RX ADMIN — Medication 3 MILLILITER(S): at 01:03

## 2019-12-15 RX ADMIN — Medication 0.5 MILLIGRAM(S): at 17:36

## 2019-12-15 RX ADMIN — Medication 5 MILLIGRAM(S): at 04:25

## 2019-12-15 RX ADMIN — CHLORHEXIDINE GLUCONATE 1 APPLICATION(S): 213 SOLUTION TOPICAL at 05:58

## 2019-12-15 RX ADMIN — Medication 20 MILLIGRAM(S): at 15:55

## 2019-12-15 RX ADMIN — Medication 3 MILLILITER(S): at 05:21

## 2019-12-15 RX ADMIN — Medication 100 MILLIEQUIVALENT(S): at 04:25

## 2019-12-15 RX ADMIN — Medication 3 MILLILITER(S): at 17:36

## 2019-12-15 RX ADMIN — Medication 100 MILLIEQUIVALENT(S): at 01:23

## 2019-12-15 RX ADMIN — Medication 0.5 MILLIGRAM(S): at 05:21

## 2019-12-15 RX ADMIN — I.V. FAT EMULSION 20.8 ML/HR: 20 EMULSION INTRAVENOUS at 17:44

## 2019-12-15 RX ADMIN — PHENYLEPHRINE HYDROCHLORIDE 10.16 MICROGRAM(S)/KG/MIN: 10 INJECTION INTRAVENOUS at 17:20

## 2019-12-15 RX ADMIN — Medication 1000 MILLIGRAM(S): at 23:03

## 2019-12-15 RX ADMIN — FENTANYL CITRATE 50 MICROGRAM(S): 50 INJECTION INTRAVENOUS at 19:54

## 2019-12-15 RX ADMIN — Medication 20 MILLIEQUIVALENT(S): at 13:52

## 2019-12-15 RX ADMIN — PIPERACILLIN AND TAZOBACTAM 25 GRAM(S): 4; .5 INJECTION, POWDER, LYOPHILIZED, FOR SOLUTION INTRAVENOUS at 05:57

## 2019-12-15 RX ADMIN — Medication 20 MILLIGRAM(S): at 10:25

## 2019-12-15 RX ADMIN — Medication 1: at 21:35

## 2019-12-15 NOTE — PROGRESS NOTE ADULT - SUBJECTIVE AND OBJECTIVE BOX
[facility] NUTRITION SUPPORT--  Attending/ PA FOLLOW UP NOTE      24 hour events/subjective: Denies Palpitations, chest pain, shortness of breath. Denies nausea nor vomiting nor abdominal pain.        PAST HISTORY  --------------------------------------------------------------------------------  No significant changes to PMH, PSH, FHx, SHx, unless otherwise noted    ALLERGIES & MEDICATIONS  --------------------------------------------------------------------------------  Allergies    No Known Allergies    Intolerances      Standing Inpatient Medications  albuterol/ipratropium for Nebulization 3 milliLiter(s) Nebulizer every 6 hours  buDESOnide    Inhalation Suspension 0.5 milliGRAM(s) Inhalation every 12 hours  chlorhexidine 2% Cloths 1 Application(s) Topical <User Schedule>  chlorhexidine 4% Liquid 1 Application(s) Topical <User Schedule>  enoxaparin Injectable 40 milliGRAM(s) SubCutaneous daily  fat emulsion (Fish Oil and Plant Based) 20% Infusion 10.4 mL/Hr IV Continuous <Continuous>  furosemide   Injectable 20 milliGRAM(s) IV Push once  insulin lispro (HumaLOG) corrective regimen sliding scale   SubCutaneous every 4 hours  metoprolol tartrate Injectable 5 milliGRAM(s) IV Push every 4 hours  Parenteral Nutrition - Adult 1 Each TPN Continuous <Continuous>    PRN Inpatient Medications  acetaminophen  IVPB .. 1000 milliGRAM(s) IV Intermittent every 6 hours PRN  sodium chloride 0.9% lock flush 10 milliLiter(s) IV Push every 1 hour PRN      REVIEW OF SYSTEMS  --------------------------------------------------------------------------------  Gen: as per HPI  Skin: No rashes  Head/Eyes/Ears/Mouth: No headache;No sore throat  Respiratory: No dyspnea, cough,   CV: No chest pain, PND, orthopnea  GI: as per HPI  : No increased frequency, dysuria, hematuria, nocturia  MSK: No joint pain/swelling; no back pain; no edema  Neuro: No dizziness/lightheadedness, weakness, seizures, numbness, tingling  Psych: No significant nervousness, anxiety, stress, depression    All other systems were reviewed and are negative, except as noted.      LABS/STUDIES  --------------------------------------------------------------------------------              10.9   14.30 >-----------<  248      [12-15-19 @ 00:26]              34.4     140  |  104  |  9   ----------------------------<  143      [12-15-19 @ 00:26]  3.4   |  26  |  0.63        Ca     7.7     [12-15-19 @ 00:26]      Mg     2.0     [12-15-19 @ 00:26]      Phos  3.9     [12-15-19 @ 00:26]    TPro  5.1  /  Alb  2.4  /  TBili  0.7  /  DBili  0.3  /  AST  18  /  ALT  24  /  AlkPhos  51  [12-15-19 @ 00:26]    PT/INR: PT 12.8 , INR 1.11       [12-15-19 @ 00:26]  PTT: 29.4       [12-15-19 @ 00:26]      Blood Gas Calcium, Ionized - Venous: 1.12 mmoL/L (12-14-19 @ 01:43)  Blood Gas Calcium, Ionized - Venous: 1.02 mmoL/L (12-13-19 @ 20:11)    Glucose, Serum: 143 mg/dL (12-15-19 @ 00:26)  Glucose, Serum: 93 mg/dL (12-14-19 @ 14:53)    PrealbuminPrealbumin, Serum: 5 mg/dL (12-13-19 @ 07:46)    Triglycerides      12-14-19 @ 07:01  -  12-15-19 @ 07:00  --------------------------------------------------------  IN: 3605.2 mL / OUT: 7360 mL / NET: -3754.8 mL        VITALS/PHYSICAL EXAM  --------------------------------------------------------------------------------  T(C): 37.7 (12-15-19 @ 08:00), Max: 37.7 (12-15-19 @ 03:00)  HR: 85 (12-15-19 @ 08:20) (75 - 101)  BP: 111/59 (12-15-19 @ 08:20) (103/54 - 160/78)  RR: 26 (12-15-19 @ 08:20) (22 - 30)  SpO2: 97% (12-15-19 @ 08:20) (90% - 100%)  Wt(kg): --    PHYSICAL EXAM  --------------------------------------------------------------------------------  	Gen: guarded but stable, A&Ox3, NC O2 (+)NGT  	HEENT: NC/AT, PERRL,              Neck: trachea midline  	Chest: non labored breathing. Rt IJ TLC dressing c/d/I   	Abd: softly distended, appropriately tender, VAC midline incision w/ good seal                        (+)ostomy pink viable- serous/bilious drainage                        LLQ JOVANNA w/ serosanguinous drainage              Extrem: WWP  	Skin: Warm, without rashes, good turgor  	  .  .  .  . [facility] NUTRITION SUPPORT--  Attending/ PA FOLLOW UP NOTE      24 hour events/subjective: PICC placed yesterday, pt started on TPN 12/14. Pt tolerating without issues,  denies palpitations, chest pain, shortness of breath. Pt tolerated a few hours of trickle feeds with noted Ostomy function, however NGT coming out this AM, and TFs have already been stopped.  Pt due to start sips once NGT removed later this AM. Denies nausea nor vomiting nor abdominal pain.        PAST HISTORY  --------------------------------------------------------------------------------  No significant changes to PMH, PSH, FHx, SHx, unless otherwise noted    ALLERGIES & MEDICATIONS  --------------------------------------------------------------------------------  Allergies    No Known Allergies    Intolerances      Standing Inpatient Medications  albuterol/ipratropium for Nebulization 3 milliLiter(s) Nebulizer every 6 hours  buDESOnide    Inhalation Suspension 0.5 milliGRAM(s) Inhalation every 12 hours  chlorhexidine 2% Cloths 1 Application(s) Topical <User Schedule>  chlorhexidine 4% Liquid 1 Application(s) Topical <User Schedule>  enoxaparin Injectable 40 milliGRAM(s) SubCutaneous daily  fat emulsion (Fish Oil and Plant Based) 20% Infusion 10.4 mL/Hr IV Continuous <Continuous>  furosemide   Injectable 20 milliGRAM(s) IV Push once  insulin lispro (HumaLOG) corrective regimen sliding scale   SubCutaneous every 4 hours  metoprolol tartrate Injectable 5 milliGRAM(s) IV Push every 4 hours  Parenteral Nutrition - Adult 1 Each TPN Continuous <Continuous>    PRN Inpatient Medications  acetaminophen  IVPB .. 1000 milliGRAM(s) IV Intermittent every 6 hours PRN  sodium chloride 0.9% lock flush 10 milliLiter(s) IV Push every 1 hour PRN      REVIEW OF SYSTEMS  --------------------------------------------------------------------------------  Gen: as per HPI  Skin: No rashes  Head/Eyes/Ears/Mouth: No headache;No sore throat  Respiratory: No dyspnea, cough,   CV: No chest pain, PND, orthopnea  GI: as per HPI  : No increased frequency, dysuria, hematuria, nocturia  MSK: No joint pain/swelling; no back pain; no edema  Neuro: No dizziness/lightheadedness, weakness, seizures, numbness, tingling  Psych: No significant nervousness, anxiety, stress, depression    All other systems were reviewed and are negative, except as noted.      LABS/STUDIES  --------------------------------------------------------------------------------              10.9   14.30 >-----------<  248      [12-15-19 @ 00:26]              34.4     140  |  104  |  9   ----------------------------<  143      [12-15-19 @ 00:26]  3.4   |  26  |  0.63        Ca     7.7     [12-15-19 @ 00:26]      Mg     2.0     [12-15-19 @ 00:26]      Phos  3.9     [12-15-19 @ 00:26]    TPro  5.1  /  Alb  2.4  /  TBili  0.7  /  DBili  0.3  /  AST  18  /  ALT  24  /  AlkPhos  51  [12-15-19 @ 00:26]    PT/INR: PT 12.8 , INR 1.11       [12-15-19 @ 00:26]  PTT: 29.4       [12-15-19 @ 00:26]      Blood Gas Calcium, Ionized - Venous: 1.12 mmoL/L (12-14-19 @ 01:43)  Blood Gas Calcium, Ionized - Venous: 1.02 mmoL/L (12-13-19 @ 20:11)    Glucose, Serum: 143 mg/dL (12-15-19 @ 00:26)  Glucose, Serum: 93 mg/dL (12-14-19 @ 14:53)    Prealbumin, Serum: 5 mg/dL (12-13-19 @ 07:46)          12-14-19 @ 07:01  -  12-15-19 @ 07:00  --------------------------------------------------------  IN: 3605.2 mL / OUT: 7360 mL / NET: -3754.8 mL        VITALS/PHYSICAL EXAM  --------------------------------------------------------------------------------  T(C): 37.7 (12-15-19 @ 08:00), Max: 37.7 (12-15-19 @ 03:00)  HR: 85 (12-15-19 @ 08:20) (75 - 101)  BP: 111/59 (12-15-19 @ 08:20) (103/54 - 160/78)  RR: 26 (12-15-19 @ 08:20) (22 - 30)  SpO2: 97% (12-15-19 @ 08:20) (90% - 100%)  Wt(kg): --    PHYSICAL EXAM  --------------------------------------------------------------------------------  	Gen: guarded but stable, A&Ox3, NC O2 (+)NGT  	HEENT: NC/AT, PERRL,              Neck: trachea midline  	Chest: non labored breathing.   	Abd: softly distended, appropriately tender, abd midline incision CDI                       (+)ostomy pink viable- serous/bilious drainage                        LLQ JOVANNA w/ serosanguinous drainage              Extrem: WWP. RUE PICC in place dressing CDI  	Skin: Warm, without rashes, good turgor  	  .  .  .  .

## 2019-12-15 NOTE — PROGRESS NOTE ADULT - ASSESSMENT
74M s/p ex lap, MIRYAM, closure of enterotomy, abthera vac placement 12/8 early morning. RTOR for exlap/washout 12/9. s/p RTOR ex lap, resection of 150cm bowel, creation of colostomy.     Plan:  - Diet: NPO w/ tube feeds at trickle rate  - c/w zosyn  - Pain medication: IV tylenol  - DVT ppx: Lovenox  - Activity: OOB  - supportive care per SICU  - TPN per SICU  - Monitor NGT and ostomy output. Will discuss with SICU possibility of clamp trial or SBFT to assess for ability to start PO feeds.    Red Team Surgery  [id number]

## 2019-12-15 NOTE — PROGRESS NOTE ADULT - ASSESSMENT
75 y/o M presenting with septic shock and high grade SBO s/p exploratory laparotomy, lysis of adhesions, decompression of bowel via enterotomy w/ primary repair, and Abthera VAC placement on 12/6; s/p take down of Abthera, washout and re-application of the Abthera vac on 12/8. Now s/p SBR and end ileostomy on 12/10.     PLAN:    Neuro: A&Ox3   - off sedation and standing pain meds.     Resp: acute respiratory failure upon initial presentation, likely undiagnosed COPD at baseline   - Now doing well on RA.   - Continue Duonebs    CV: septic shock requiring vasopressor support, now hemodynamically stable   - Off pressors   - metoprolol 5mg q4 for rate control     GI: SBO s/p exploratory laparotomy, lysis of adhesions, decompression of bowel via enterotomy w/ primary repair, and Abthera VAC placement and removal with 150 cm of small bowel removed and end ileostomy.   - Tolerating trickle feeds at 20 through ng tube.   - Started on TPN through PICC line  - Monitor ostomy output  - Protonix for stress ulcer prophylaxis    Renal: CHI, resolved   - Heavy diuresis during the day yesterday with Diamox 250 x2 and lasix 20mg x2 for pleural effusions on CXR.   - Patient now 3L negative, and euvolemic on exam. DC diuresis.   - Replete electrolytes as needed.   - Martinez, strict I&Os    Heme: no acute issues  - Lovenox for VTE prophylaxis  - SCDs     ID: septic shock 2/2 intra abdominal infection  - Continue Zosyn per surgery team     Endo: no acute issues  - Started on sliding scale with initiation of TPN, q4 fingersticks.

## 2019-12-15 NOTE — PROGRESS NOTE ADULT - ATTENDING COMMENTS
Awake and alert  Resolving effusion congestion, one dose Lasix, replace K aggressively, metabolic alkalosis resolved  TPN, adjustment of Insulin and K in TPN  No residual on trophic feed, will DC NGT, clears  COPD meds  OOB mobilization  DC all abx today Awake and alert  Resolving effusion congestion, one dose Lasix, replace K aggressively, metabolic alkalosis resolved  TPN, adjustment of Insulin and K in TPN  No residual on trophic feed, will DC NGT, clears  COPD meds  OOB mobilization  DC all abx today    Addendum  Became hypoxemic tachypneic with cold clammy skin, mentating well  ABG consistent with acute hypercapnic resp failure, placed on BiPAP, will start IV solumedrol to treat possible COPD exacerbation  Cardiac enzymes sent to r/o MI  Became hypotensive once received small dose Lasix for pulmonary vascular congestion, will Rx with vasopressor support with neosynephrine  Duplex of LE, pt on pharmacologic DVT prophylaxis with Lovenox, PE unlikely  Wife updated

## 2019-12-15 NOTE — PROCEDURAL SAFETY CHECKLIST WITH OR WITHOUT SEDATION - NSPOSTCOMMENTFT_GEN_ALL_CORE
Patient safety maintained throughout the case. MD Smith and MD wilson and GONZALO Lewis at bedside throughou

## 2019-12-15 NOTE — PROGRESS NOTE ADULT - PROBLEM SELECTOR PLAN 1
s/p ex lap, MIRYAM, closure of enterotomy, abthera vac placement  s/p washout  s/p PICC line placement . TPN started   Stable

## 2019-12-15 NOTE — PROGRESS NOTE ADULT - SUBJECTIVE AND OBJECTIVE BOX
Subjective: Patient seen and examined. No new events except as noted.   remains in ICU   feels ok   s/p PICC line. TPN started   no cp or sob     REVIEW OF SYSTEMS:    CONSTITUTIONAL:+ weakness, fevers or chills  EYES/ENT: No visual changes;  No vertigo or throat pain   NECK: No pain or stiffness  RESPIRATORY: No cough, wheezing, hemoptysis; No shortness of breath  CARDIOVASCULAR: No chest pain or palpitations  GASTROINTESTINAL: + abdominal or epigastric pain. No nausea, vomiting, or hematemesis; No diarrhea or constipation. No melena or hematochezia.  GENITOURINARY: No dysuria, frequency or hematuria  NEUROLOGICAL: No numbness or weakness  SKIN: No itching, burning, rashes, or lesions   All other review of systems is negative unless indicated above.    MEDICATIONS:  MEDICATIONS  (STANDING):  albuterol/ipratropium for Nebulization 3 milliLiter(s) Nebulizer every 6 hours  buDESOnide    Inhalation Suspension 0.5 milliGRAM(s) Inhalation every 12 hours  chlorhexidine 2% Cloths 1 Application(s) Topical <User Schedule>  chlorhexidine 4% Liquid 1 Application(s) Topical <User Schedule>  enoxaparin Injectable 40 milliGRAM(s) SubCutaneous daily  fat emulsion (Fish Oil and Plant Based) 20% Infusion 10.4 mL/Hr (10.4 mL/Hr) IV Continuous <Continuous>  insulin lispro (HumaLOG) corrective regimen sliding scale   SubCutaneous every 4 hours  metoprolol tartrate Injectable 5 milliGRAM(s) IV Push every 4 hours  Parenteral Nutrition - Adult 1 Each (100 mL/Hr) TPN Continuous <Continuous>      PHYSICAL EXAM:  T(C): 37.7 (12-15-19 @ 08:00), Max: 37.7 (12-15-19 @ 03:00)  HR: 76 (12-15-19 @ 10:00) (75 - 101)  BP: 120/57 (12-15-19 @ 10:00) (103/54 - 160/78)  RR: 34 (12-15-19 @ 10:00) (22 - 38)  SpO2: 99% (12-15-19 @ 10:00) (90% - 100%)  Wt(kg): --  I&O's Summary    14 Dec 2019 07:01  -  15 Dec 2019 07:00  --------------------------------------------------------  IN: 3605.2 mL / OUT: 7360 mL / NET: -3754.8 mL    15 Dec 2019 07:01  -  15 Dec 2019 10:37  --------------------------------------------------------  IN: 300 mL / OUT: 50 mL / NET: 250 mL          Appearance: Lethargic   HEENT:   Dry oral mucosa RIJ line   Lymphatic: No lymphadenopathy +NGT   Cardiovascular: Normal S1 S2, No JVD, No murmurs , Peripheral pulses palpable 2+ bilaterally  Respiratory: Decreased bs   Gastrointestinal:  Appropriately tender, +VAC  Skin: No rashes, No ecchymoses, No cyanosis, warm to touch  Musculoskeletal: Decreased  range of motion and strength  Psychiatry:  mildly sedated   Ext: No edema +PICC line   +rosas     LABS:    CARDIAC MARKERS:                                10.9   14.30 )-----------( 248      ( 15 Dec 2019 00:26 )             34.4     12-15    140  |  104  |  9   ----------------------------<  143<H>  3.4<L>   |  26  |  0.63    Ca    7.7<L>      15 Dec 2019 00:26  Phos  3.9     12-15  Mg     2.0     12-15    TPro  5.1<L>  /  Alb  2.4<L>  /  TBili  0.7  /  DBili  0.3<H>  /  AST  18  /  ALT  24  /  AlkPhos  51  12-15    proBNP:   Lipid Profile:   HgA1c:   TSH:             TELEMETRY: 	SR    ECG:  	  RADIOLOGY:  < from: Xray Chest 1 View- PORTABLE-Routine (12.15.19 @ 07:20) >    EXAM:  XR CHEST PORTABLE ROUTINE 1V                            PROCEDURE DATE:  12/15/2019            INTERPRETATION:  CLINICAL INFORMATION: Pleural effusions.    EXAM: AP chest radiograph.    COMPARISON: Chest radiograph from 12/14/2019    FINDINGS AND   IMPRESSION:  Enteric tube tip below diaphragm. Right-sided PICC with tip in SVC.  Heart size is not enlarged. Aortic knob calcifications.  Bilateral lower lobe opacities, which could represent pneumonia and/or   atelectasis. Trace right pleural effusion. Left costophrenic angle is not   included in this study. No pneumothorax.  Degenerative changes of the spine.                ANNETTA ASIF M.D., RADIOLOGY RESIDENT  This document has been electronically signed.  JOSE FORD M.D., ATTENDING RADIOLOGIST  This document has been electronically signed. Dec 15 2019 10:23AM                < end of copied text >    DIAGNOSTIC TESTING:  [ ] Echocardiogram:  [ ]  Catheterization:  [ ] Stress Test:    OTHER:

## 2019-12-15 NOTE — PROGRESS NOTE ADULT - ATTENDING COMMENTS
Patient seen and examined  Significant ileostomy output overnight 2500mL    On exam: awake, alert  Breathing comfortably  Abd is soft, not distended, appropriately tender  Ostomy pink, edematous, viable with enteric output    - Appreciate continued SICU care  - Clear liquid diet  - Continue TPN

## 2019-12-15 NOTE — PROGRESS NOTE ADULT - ASSESSMENT
74 year old male w/ PMH of COPD and EtOH dependence with PSH/o appy, prostate surgery, & spine surgery  septic shock and high grade SBO s/p exploratory laparotomy, lysis of adhesions, decompression of bowel via enterotomy w/ primary repair, and Abthera VAC placement on 12/6; s/p take down of Abthera, washout and re-application of the Abthera vac on 12/8. Now s/p SBR and end ileostomy on 12/10.   TPN consulted to assist w/ management of pt's nutrition in pt w/ prolonged hospital course who remains NPO w/NGT        1. TPN to goal today with lipids  2. Strict Intake and Output.  3. Weights three times a week  4. Monitor BMP, Mg, Ionized Ca, Phosphorus daily  5. Triglycerides daily for first 3 days of TPN or until stable, then weekly   6. Pre-albumin weekly  7. Hyperglycemia - consider HgA1c  TPN initiated w/  Insulin 5U to manage hyperglycemia      Fingersticks to monitor glucose every 6 hours until stable, may be decreased to twice a day      ISS coverage - to be ordered by primary team  8. HypoPhos & Low K initiate TPN with 60mEq NaPhos & 80mEqKCl to compound concern for Refeeding Syndrome  Continue as per SICU / Surgery, will follow with you, D/w primary team, PGy-2  Marlene Mcmullen [phone]      TPN team, pager [phone]  D/w Ana M Siegel 74 year old male w/ PMH of COPD and EtOH dependence with PSH/o appy, prostate surgery, & spine surgery  septic shock and high grade SBO s/p exploratory laparotomy, lysis of adhesions, decompression of bowel via enterotomy w/ primary repair, and Abthera VAC placement on 12/6; s/p take down of Abthera, washout and re-application of the Abthera vac on 12/8. Now s/p SBR and end ileostomy on 12/10.  TPN consulted to assist w/ management of pt's nutrition in pt w/ prolonged hospital course who remained NPO w/NGT, NGT coming out today and pt being advanced to sips. Pt started TPN on 12/14.    - TPN to goal today with lipids  - Strict Intake and Output.  - Weights three times a week  - Monitor BMP, Mg, Ionized Ca, Phosphorus daily  - Triglycerides daily for first 3 days of TPN or until stable, then weekly   - Pre-albumin weekly  - Hyperglycemia- consider HgA1c  TPN initiated w/  Insulin 5U to manage hyperglycemia, Fingersticks to monitor glucose every 6 hours until stable, may be decreased to twice a day. ISS coverage - to be ordered by primary team.  - HypoPhos- improved  - hypokalemia- improved.    Continue as per SICU / Surgery, will follow with you.  D/w primary team, PGy-2  Marlene Mcmullen [phone]      TPN team, pager [phone]  D/w Ana M Siegel 74 year old male w/ PMH of COPD and EtOH dependence with PSH/o appy, prostate surgery, & spine surgery  septic shock and high grade SBO s/p exploratory laparotomy, lysis of adhesions, decompression of bowel via enterotomy w/ primary repair, and Abthera VAC placement on 12/6; s/p take down of Abthera, washout and re-application of the Abthera vac on 12/8. Now s/p SBR and end ileostomy on 12/10.  TPN consulted to assist w/ management of pt's nutrition in pt w/ prolonged hospital course who remained NPO w/NGT, NGT coming out today and pt being advanced to sips. Pt started TPN on 12/14.    - TPN to goal today with lipids  - Strict Intake and Output.  - Weights three times a week  - Monitor BMP, Mg, Ionized Ca, Phosphorus daily  - Triglycerides daily for first 3 days of TPN or until stable, then weekly   - Pre-albumin weekly  - Hyperglycemia- consider HgA1c  TPN initiated w/  Insulin 5U to manage hyperglycemia, Fingersticks to monitor glucose every 6 hours until stable, may be decreased to twice a day. ISS coverage - to be ordered by primary team.  - HypoPhos- improved  - hypokalemia- increased K to 120Meq    Continue as per SICU / Surgery, will follow with you.  D/w primary team,    TPN team, pager [phone]  D/w Ana M Chacko and MU Siegel

## 2019-12-15 NOTE — PROGRESS NOTE ADULT - SUBJECTIVE AND OBJECTIVE BOX
HISTORY    74 year old male with a past medical history of COPD and EtOH dependence who presented today with abdominal pain, nausea, hematemesis, and poor PO intake for ~2-3 days. Labs significant for an CHI w/ Cr 2.74 and lactate of 9.4. He was also notably tachycardic to the 110s and hypotensive w/ SBP in the 70s. He was given 2 units of PRBCs and 2 L of LR in the ED due to concern for upper GI bleeding. Imaging revealed high grade SBO in the RLQ. Patient was taken to the OR emergently for an exploratory laparotomy, lysis of adhesions, decompression of bowel via enterotomy w/ primary repair, and Abthera VAC placement. Of note, the distal 50% of the bowel appeared dusky but viable. He required vasopressor support with phenylephrine and vasopressin infusions. He received 3000 mL of crystalloid w/ EBL of 10 mL and UOP of 25 mL. Patient was left intubated at the end of the case so SICU was consulted for hemodynamic monitoring. Taken to the OR on 12/6 and underwent Ex-lap with MIRYAM, enterotomy for bowel decompression and Abthera VAC placement. Taken back to the OR on 12/8 and underwent take down of Abthera, washout and re-application of the Abthera vac. Went back to OR on 12/10 night for colectomy.    24 HOUR EVENTS:  - PICC line placed, TPN started and fluids DC'ed. Started on sliding scale with q4 finger stick.   - Started on trickle feeds at 10 initially tolerating well, upped to 20.   - Diuresed with diamox 250 (x2) and lasix 20 (x2), -3L in last 24 hours.   - hypokalemic, potassium repleted     SUBJECTIVE/ROS: Resting comfortably, no complaints.   [x] A ten-point review of systems was otherwise negative except as noted.  [ ] Due to altered mental status/intubation, subjective information were not able to be obtained from the patient. History was obtained, to the extent possible, from review of the chart and collateral sources of information.      NEURO  Exam: A&Ox3   Meds: acetaminophen  IVPB .. 1000 milliGRAM(s) IV Intermittent every 6 hours PRN Moderate Pain (4 - 6)    [x] Adequacy of sedation and pain control has been assessed and adjusted      RESPIRATORY  RR: 26 (12-15-19 @ 00:00) (22 - 33)  SpO2: 99% (12-15-19 @ 00:00) (92% - 100%)  Wt(kg): --  Exam: unlabored, clear to auscultation bilaterally  Mechanical Ventilation:     [ ] Extubation Readiness Assessed  Meds: acetylcysteine 20%  Inhalation 4 milliLiter(s) Inhalation every 6 hours  albuterol/ipratropium for Nebulization 3 milliLiter(s) Nebulizer every 6 hours  buDESOnide    Inhalation Suspension 0.5 milliGRAM(s) Inhalation every 12 hours        CARDIOVASCULAR  HR: 96 (12-15-19 @ 00:00) (74 - 100)  BP: 109/60 (12-15-19 @ 00:00) (103/54 - 160/78)  BP(mean): 78 (12-15-19 @ 00:00) (74 - 113)  ABP: --  ABP(mean): --  Wt(kg): --  CVP(cm H2O): --  VBG - ( 14 Dec 2019 01:43 )  pH: 7.45  /  pCO2: 45    /  pO2: 40    / HCO3: 31    / Base Excess: 6.6   /  SaO2: 74     Lactate: 1.1                Exam: RRR, s1s2   Perfusion     [x]Adequate   [ ]Inadequate  Mentation   [x]Normal       [ ]Reduced  Extremities  [x]Warm         [ ]Cool  Volume Status [ ]Hypervolemic [x]Euvolemic [ ]Hypovolemic  Meds: metoprolol tartrate Injectable 5 milliGRAM(s) IV Push every 4 hours        GI/NUTRITION  Exam: soft non tender, good ostomy output   Diet: Trickle feeds at 20     GENITOURINARY  I&O's Detail    12-13 @ 07:01  -  12-14 @ 07:00  --------------------------------------------------------  IN:    dextrose 5% + lactated ringers.: 1200 mL    IV PiggyBack: 716.5 mL    Solution: 575 mL    Solution: 600 mL  Total IN: 3091.5 mL    OUT:    Bulb: 520 mL    Ileostomy: 1250 mL    Incontinent per Condom Catheter: 3500 mL    Nasoenteral Tube: 650 mL  Total OUT: 5920 mL    Total NET: -2828.5 mL      12-14 @ 07:01  -  12-15 @ 00:57  --------------------------------------------------------  IN:    dextrose 5% + lactated ringers.: 100 mL    dextrose 5% + lactated ringers.: 300 mL    dextrose 5% + lactated ringers.: 40 mL    Enteral Tube Flush: 20 mL    fat emulsion (Fish Oil and Plant Based) 20% Infusion: 72.8 mL    IV PiggyBack: 350 mL    ns in tub fed  [id number]: 220 mL    Solution: 100 mL    Solution: 400 mL    TPN (Total Parenteral Nutrition): 700 mL  Total IN: 2302.8 mL    OUT:    Bulb: 310 mL    Ileostomy: 1800 mL    Incontinent per Condom Catheter: 3500 mL    Nasoenteral Tube: 250 mL  Total OUT: 5860 mL    Total NET: -3557.2 mL          12-14    142  |  106  |  8   ----------------------------<  93  3.5   |  27  |  0.61    Ca    7.6<L>      14 Dec 2019 14:53  Phos  3.2     12-14  Mg     1.9     12-14    TPro  5.1<L>  /  Alb  2.4<L>  /  TBili  0.8  /  DBili  0.3<H>  /  AST  22  /  ALT  25  /  AlkPhos  54  12-14    [ ] Martinez catheter, indication: N/A  Meds: fat emulsion (Fish Oil and Plant Based) 20% Infusion 10.4 mL/Hr IV Continuous <Continuous>  Parenteral Nutrition - Adult 1 Each TPN Continuous <Continuous>  sodium chloride 0.9% lock flush 10 milliLiter(s) IV Push every 1 hour PRN Pre/post blood products, medications, blood draw, and to maintain line patency        HEMATOLOGIC  Meds: enoxaparin Injectable 40 milliGRAM(s) SubCutaneous daily    [x] VTE Prophylaxis                        10.9   14.30 )-----------( 248      ( 15 Dec 2019 00:26 )             34.4     PT/INR - ( 15 Dec 2019 00:26 )   PT: 12.8 sec;   INR: 1.11 ratio         PTT - ( 15 Dec 2019 00:26 )  PTT:29.4 sec  Transfusion     [ ] PRBC   [ ] Platelets   [ ] FFP   [ ] Cryoprecipitate      INFECTIOUS DISEASES  T(C): 37.1 (12-14-19 @ 23:00), Max: 37.4 (12-14-19 @ 07:00)  Wt(kg): --  WBC Count: 14.30 K/uL (12-15 @ 00:26)  WBC Count: 14.60 K/uL (12-14 @ 14:53)  WBC Count: 16.08 K/uL (12-14 @ 02:00)    Recent Cultures:    Meds: piperacillin/tazobactam IVPB.. 3.375 Gram(s) IV Intermittent every 8 hours        ENDOCRINE  Capillary Blood Glucose    Meds: insulin lispro (HumaLOG) corrective regimen sliding scale   SubCutaneous every 4 hours        ACCESS DEVICES:  [x] Peripheral IV  [ ] Central Venous Line	[ ] R	[ ] L	[ ] IJ	[ ] Fem	[ ] SC	Placed:   [ ] Arterial Line		[ ] R	[ ] L	[ ] Fem	[ ] Rad	[ ] Ax	Placed:   [x] PICC:	 placed on 12/14				[ ] Mediport  [ ] Urinary Catheter, Date Placed:   [ ] Necessity of urinary, arterial, and venous catheters discussed    OTHER MEDICATIONS:  chlorhexidine 2% Cloths 1 Application(s) Topical <User Schedule>  chlorhexidine 4% Liquid 1 Application(s) Topical <User Schedule>

## 2019-12-15 NOTE — PHYSICAL THERAPY INITIAL EVALUATION ADULT - GENERAL OBSERVATIONS, REHAB EVAL
Pt found semi supine +tele +pulse ox +BP cuff +TPN +IVs +ostomy +condom cath +b/l LE compression cuffs. Pt found semi supine +tele +pulse ox +BP cuff +TPN +IVs +JOVANNA drain +ostomy +condom cath +L pigtail +b/l LE compression cuffs.

## 2019-12-15 NOTE — PROGRESS NOTE ADULT - SUBJECTIVE AND OBJECTIVE BOX
POD #:     No acute events overnight    SUBJECTIVE:  Reports pain  Denies nausea, vomiting, diarrhea  Is passing gas and having bowel movements  Tolerating diet  Ambulating independently    OBJECTIVE:  Vital Signs Last 24 Hrs  T(C): 37.7 (15 Dec 2019 03:00), Max: 37.7 (15 Dec 2019 03:00)  T(F): 99.9 (15 Dec 2019 03:00), Max: 99.9 (15 Dec 2019 03:00)  HR: 97 (15 Dec 2019 06:00) (74 - 101)  BP: 126/97 (15 Dec 2019 06:00) (103/54 - 160/78)  BP(mean): 107 (15 Dec 2019 06:00) (74 - 113)  RR: 25 (15 Dec 2019 06:00) (22 - 33)  SpO2: 90% (15 Dec 2019 06:00) (90% - 100%)      Physical Examination:  GEN: NAD, resting quietly  NEURO: AAOx3, CN II-XII grossly intact, no focal deficits  PULM: symmetric chest rise bilaterally, no increased WOB  ABD: soft, nontender, nondistended, incision CDI  EXTR: no cyanosis or edema, moving all extremities    LABS:                        10.9   14.30 )-----------( 248      ( 15 Dec 2019 00:26 )             34.4       12-15    140  |  104  |  9   ----------------------------<  143<H>  3.4<L>   |  26  |  0.63    Ca    7.7<L>      15 Dec 2019 00:26  Phos  3.9     12-15  Mg     2.0     12-15    TPro  5.1<L>  /  Alb  2.4<L>  /  TBili  0.7  /  DBili  0.3<H>  /  AST  18  /  ALT  24  /  AlkPhos  51  12-15        IMAGING:  [] No acute events overnight    SUBJECTIVE/24 HOUR EVENTS:  - PICC line placed, TPN started and fluids DC'ed. Started on sliding scale with q4 finger stick.   - Started on trickle feeds at 10 initially tolerating well, upped to 20.   - Diuresed with diamox 250 (x2) and lasix 20 (x2), -3L in last 24 hours.   - hypokalemic, potassium repleted   - patient reports feeling well this AM, stronger every day. He is having ostomy function but is continuing to feel some gas pain.       OBJECTIVE:  Vital Signs Last 24 Hrs  T(C): 37.7 (15 Dec 2019 03:00), Max: 37.7 (15 Dec 2019 03:00)  T(F): 99.9 (15 Dec 2019 03:00), Max: 99.9 (15 Dec 2019 03:00)  HR: 97 (15 Dec 2019 06:00) (74 - 101)  BP: 126/97 (15 Dec 2019 06:00) (103/54 - 160/78)  BP(mean): 107 (15 Dec 2019 06:00) (74 - 113)  RR: 25 (15 Dec 2019 06:00) (22 - 33)  SpO2: 90% (15 Dec 2019 06:00) (90% - 100%)      Physical Examination:  GEN: NAD, resting quietly  NEURO: AAOx3, CN II-XII grossly intact, no focal deficits  PULM: symmetric chest rise bilaterally, no increased WOB  ABD: soft, nontender, nondistended, incision CDI  EXTR: no cyanosis or edema, moving all extremities    LABS:                        10.9   14.30 )-----------( 248      ( 15 Dec 2019 00:26 )             34.4       12-15    140  |  104  |  9   ----------------------------<  143<H>  3.4<L>   |  26  |  0.63    Ca    7.7<L>      15 Dec 2019 00:26  Phos  3.9     12-15  Mg     2.0     12-15    TPro  5.1<L>  /  Alb  2.4<L>  /  TBili  0.7  /  DBili  0.3<H>  /  AST  18  /  ALT  24  /  AlkPhos  51  12-15        IMAGING:  []

## 2019-12-16 LAB
ALBUMIN SERPL ELPH-MCNC: 2.8 G/DL — LOW (ref 3.3–5)
ALP SERPL-CCNC: 55 U/L — SIGNIFICANT CHANGE UP (ref 40–120)
ALT FLD-CCNC: 24 U/L — SIGNIFICANT CHANGE UP (ref 10–45)
ANION GAP SERPL CALC-SCNC: 10 MMOL/L — SIGNIFICANT CHANGE UP (ref 5–17)
ANION GAP SERPL CALC-SCNC: 13 MMOL/L — SIGNIFICANT CHANGE UP (ref 5–17)
AST SERPL-CCNC: 16 U/L — SIGNIFICANT CHANGE UP (ref 10–40)
BILIRUB DIRECT SERPL-MCNC: 0.3 MG/DL — HIGH (ref 0–0.2)
BILIRUB INDIRECT FLD-MCNC: 0.4 MG/DL — SIGNIFICANT CHANGE UP (ref 0.2–1)
BILIRUB SERPL-MCNC: 0.7 MG/DL — SIGNIFICANT CHANGE UP (ref 0.2–1.2)
BUN SERPL-MCNC: 19 MG/DL — SIGNIFICANT CHANGE UP (ref 7–23)
BUN SERPL-MCNC: 25 MG/DL — HIGH (ref 7–23)
CALCIUM SERPL-MCNC: 8.3 MG/DL — LOW (ref 8.4–10.5)
CALCIUM SERPL-MCNC: 8.8 MG/DL — SIGNIFICANT CHANGE UP (ref 8.4–10.5)
CHLORIDE SERPL-SCNC: 101 MMOL/L — SIGNIFICANT CHANGE UP (ref 96–108)
CHLORIDE SERPL-SCNC: 95 MMOL/L — LOW (ref 96–108)
CO2 SERPL-SCNC: 25 MMOL/L — SIGNIFICANT CHANGE UP (ref 22–31)
CO2 SERPL-SCNC: 29 MMOL/L — SIGNIFICANT CHANGE UP (ref 22–31)
CREAT SERPL-MCNC: 0.59 MG/DL — SIGNIFICANT CHANGE UP (ref 0.5–1.3)
CREAT SERPL-MCNC: 0.6 MG/DL — SIGNIFICANT CHANGE UP (ref 0.5–1.3)
GLUCOSE BLDC GLUCOMTR-MCNC: 116 MG/DL — HIGH (ref 70–99)
GLUCOSE BLDC GLUCOMTR-MCNC: 125 MG/DL — HIGH (ref 70–99)
GLUCOSE BLDC GLUCOMTR-MCNC: 126 MG/DL — HIGH (ref 70–99)
GLUCOSE BLDC GLUCOMTR-MCNC: 160 MG/DL — HIGH (ref 70–99)
GLUCOSE BLDC GLUCOMTR-MCNC: 191 MG/DL — HIGH (ref 70–99)
GLUCOSE BLDC GLUCOMTR-MCNC: 221 MG/DL — HIGH (ref 70–99)
GLUCOSE SERPL-MCNC: 132 MG/DL — HIGH (ref 70–99)
GLUCOSE SERPL-MCNC: 181 MG/DL — HIGH (ref 70–99)
HCT VFR BLD CALC: 32.2 % — LOW (ref 39–50)
HGB BLD-MCNC: 10.4 G/DL — LOW (ref 13–17)
MAGNESIUM SERPL-MCNC: 1.9 MG/DL — SIGNIFICANT CHANGE UP (ref 1.6–2.6)
MAGNESIUM SERPL-MCNC: 2.5 MG/DL — SIGNIFICANT CHANGE UP (ref 1.6–2.6)
MCHC RBC-ENTMCNC: 30.3 PG — SIGNIFICANT CHANGE UP (ref 27–34)
MCHC RBC-ENTMCNC: 32.3 GM/DL — SIGNIFICANT CHANGE UP (ref 32–36)
MCV RBC AUTO: 93.9 FL — SIGNIFICANT CHANGE UP (ref 80–100)
NRBC # BLD: 0 /100 WBCS — SIGNIFICANT CHANGE UP (ref 0–0)
PHOSPHATE SERPL-MCNC: 4 MG/DL — SIGNIFICANT CHANGE UP (ref 2.5–4.5)
PHOSPHATE SERPL-MCNC: 4 MG/DL — SIGNIFICANT CHANGE UP (ref 2.5–4.5)
PLATELET # BLD AUTO: 311 K/UL — SIGNIFICANT CHANGE UP (ref 150–400)
POTASSIUM SERPL-MCNC: 4.2 MMOL/L — SIGNIFICANT CHANGE UP (ref 3.5–5.3)
POTASSIUM SERPL-MCNC: 4.3 MMOL/L — SIGNIFICANT CHANGE UP (ref 3.5–5.3)
POTASSIUM SERPL-SCNC: 4.2 MMOL/L — SIGNIFICANT CHANGE UP (ref 3.5–5.3)
POTASSIUM SERPL-SCNC: 4.3 MMOL/L — SIGNIFICANT CHANGE UP (ref 3.5–5.3)
PROT SERPL-MCNC: 5.6 G/DL — LOW (ref 6–8.3)
RBC # BLD: 3.43 M/UL — LOW (ref 4.2–5.8)
RBC # FLD: 13.8 % — SIGNIFICANT CHANGE UP (ref 10.3–14.5)
SODIUM SERPL-SCNC: 136 MMOL/L — SIGNIFICANT CHANGE UP (ref 135–145)
SODIUM SERPL-SCNC: 137 MMOL/L — SIGNIFICANT CHANGE UP (ref 135–145)
SURGICAL PATHOLOGY STUDY: SIGNIFICANT CHANGE UP
TRIGL SERPL-MCNC: 107 MG/DL — SIGNIFICANT CHANGE UP (ref 10–149)
WBC # BLD: 19.99 K/UL — HIGH (ref 3.8–10.5)
WBC # FLD AUTO: 19.99 K/UL — HIGH (ref 3.8–10.5)

## 2019-12-16 PROCEDURE — 99232 SBSQ HOSP IP/OBS MODERATE 35: CPT | Mod: GC

## 2019-12-16 PROCEDURE — 99232 SBSQ HOSP IP/OBS MODERATE 35: CPT

## 2019-12-16 PROCEDURE — 71045 X-RAY EXAM CHEST 1 VIEW: CPT | Mod: 26

## 2019-12-16 RX ORDER — MAGNESIUM SULFATE 500 MG/ML
1 VIAL (ML) INJECTION ONCE
Refills: 0 | Status: COMPLETED | OUTPATIENT
Start: 2019-12-16 | End: 2019-12-16

## 2019-12-16 RX ORDER — I.V. FAT EMULSION 20 G/100ML
20.8 EMULSION INTRAVENOUS
Qty: 50 | Refills: 0 | Status: DISCONTINUED | OUTPATIENT
Start: 2019-12-16 | End: 2019-12-17

## 2019-12-16 RX ORDER — AMIODARONE HYDROCHLORIDE 400 MG/1
0.5 TABLET ORAL
Qty: 900 | Refills: 0 | Status: DISCONTINUED | OUTPATIENT
Start: 2019-12-16 | End: 2019-12-17

## 2019-12-16 RX ORDER — ELECTROLYTE SOLUTION,INJ
1 VIAL (ML) INTRAVENOUS
Refills: 0 | Status: DISCONTINUED | OUTPATIENT
Start: 2019-12-16 | End: 2019-12-16

## 2019-12-16 RX ORDER — AMIODARONE HYDROCHLORIDE 400 MG/1
0.5 TABLET ORAL
Qty: 900 | Refills: 0 | Status: DISCONTINUED | OUTPATIENT
Start: 2019-12-16 | End: 2019-12-16

## 2019-12-16 RX ORDER — ONDANSETRON 8 MG/1
4 TABLET, FILM COATED ORAL ONCE
Refills: 0 | Status: DISCONTINUED | OUTPATIENT
Start: 2019-12-15 | End: 2019-12-16

## 2019-12-16 RX ORDER — ONDANSETRON 8 MG/1
4 TABLET, FILM COATED ORAL EVERY 6 HOURS
Refills: 0 | Status: DISCONTINUED | OUTPATIENT
Start: 2019-12-16 | End: 2020-01-10

## 2019-12-16 RX ORDER — METOPROLOL TARTRATE 50 MG
12.5 TABLET ORAL EVERY 12 HOURS
Refills: 0 | Status: DISCONTINUED | OUTPATIENT
Start: 2019-12-16 | End: 2019-12-17

## 2019-12-16 RX ADMIN — Medication 3 MILLILITER(S): at 23:15

## 2019-12-16 RX ADMIN — Medication 0.5 MILLIGRAM(S): at 17:44

## 2019-12-16 RX ADMIN — Medication 0.5 MILLIGRAM(S): at 05:14

## 2019-12-16 RX ADMIN — Medication 1: at 05:39

## 2019-12-16 RX ADMIN — Medication 1 EACH: at 17:15

## 2019-12-16 RX ADMIN — CHLORHEXIDINE GLUCONATE 1 APPLICATION(S): 213 SOLUTION TOPICAL at 05:40

## 2019-12-16 RX ADMIN — Medication 3 MILLILITER(S): at 11:49

## 2019-12-16 RX ADMIN — Medication 3 MILLILITER(S): at 17:44

## 2019-12-16 RX ADMIN — Medication 1 EACH: at 22:01

## 2019-12-16 RX ADMIN — I.V. FAT EMULSION 20.8 ML/HR: 20 EMULSION INTRAVENOUS at 22:01

## 2019-12-16 RX ADMIN — Medication 1: at 03:34

## 2019-12-16 RX ADMIN — CHLORHEXIDINE GLUCONATE 1 APPLICATION(S): 213 SOLUTION TOPICAL at 05:39

## 2019-12-16 RX ADMIN — I.V. FAT EMULSION 20.8 ML/HR: 20 EMULSION INTRAVENOUS at 17:15

## 2019-12-16 RX ADMIN — Medication 3 MILLILITER(S): at 05:14

## 2019-12-16 RX ADMIN — AMIODARONE HYDROCHLORIDE 16.67 MG/MIN: 400 TABLET ORAL at 22:00

## 2019-12-16 RX ADMIN — Medication 100 GRAM(S): at 05:39

## 2019-12-16 RX ADMIN — ENOXAPARIN SODIUM 40 MILLIGRAM(S): 100 INJECTION SUBCUTANEOUS at 12:09

## 2019-12-16 NOTE — PROGRESS NOTE ADULT - ASSESSMENT
· Assessment	  74M s/p ex lap, MIRYAM, closure of enterotomy, abthera vac placement 12/8 early morning. RTOR for exlap/washout 12/9. s/p RTOR ex lap, resection of 150cm bowel, creation of colostomy.     Plan:  - Diet: LRD w/ tube feeds at trickle rate  - c/w zosyn  - Pain medication: IV tylenol  - DVT ppx: Lovenox  - Activity: OOB  - supportive care per SICU  - TPN per SICU  - Monitor NGT and ostomy output. Will discuss with SICU possibility of clamp trial or SBFT to assess for ability to start PO feeds.    Red Team Surgery  [id number]

## 2019-12-16 NOTE — PROGRESS NOTE ADULT - ATTENDING COMMENTS
Patient seen and examined  He had a left pigtail catheter placed yesterday for pneumothorax  He is comfortable  Denies any nausea, or vomiting  Denies any chest pain or shortness of breath at this time    On exam: he is awake, alert and oriented currently on Amiodarone gtt  He is breathing comfortably with supplemental oxygen via NC  Left pigtail catheter in place to suction  Abd is soft, not distended, appropriately tender at incision site  Staple line is clean ,dry, and intact without erythema  Ileostomy is pink, viable, edematous with output 1550mL over 24 hours which is decreased from the day prior    - Appreciate continued SICU care  - Regular diet

## 2019-12-16 NOTE — PROGRESS NOTE ADULT - ATTENDING COMMENTS
ON: on bipap developed PTX left sided pigtail cath placed with immediate improvement. diuresis yesterday, continued COPD meds.   today, ileostomy output of 2.4 liters.  net neg 1.4 liters  CT 55cc output  JOVANNA serous 190 cc  staples intact  on TPN and started on feeds  lactate cleared to 1, off pressors   on amiodarone  zosyn stopped yesterday  afebrile, however leukocytosis up to 19, Bcx completed yesterday. leukocytosis thought to be due to resp distress yesterday  AM CXR stable, left lung expanded fully  alb5% yesterday as bolus    - given improved output of ostomy and abd Xray, will slowly advance diet  - will hold of restart antibiotics  - cont TPN  - chest tube to remain for today  - net goal of even, hold of diuresis for now  - ambulate, PT/OT  - remains in SICU

## 2019-12-16 NOTE — PROGRESS NOTE ADULT - SUBJECTIVE AND OBJECTIVE BOX
[facility] NUTRITION SUPPORT / TPN -- FOLLOW UP NOTE  --------------------------------------------------------------------------------    24 hour events/subjective:    74y Male with a past medical history of COPD and EtOH dependence who presented 12/6 with abdominal pain, nausea, hematemesis, and poor PO intake for ~2-3 days. Labs significant for an CHI w/ Cr 2.74 and lactate of 9.4. He was also notably tachycardic to the 110s and hypotensive w/ SBP in the 70s. He was given 2 units of PRBCs and 2 L of LR in the ED due to concern for upper GI bleeding. Imaging revealed high grade SBO in the RLQ. Patient was taken to the OR emergently for an exploratory laparotomy, lysis of adhesions, decompression of bowel via enterotomy w/ primary repair, and Abthera VAC placement. Of note, the distal 50% of the bowel appeared dusky but viable. He required vasopressor support with phenylephrine and vasopressin infusions. He received 3000 mL of crystalloid w/ EBL of 10 mL and UOP of 25 mL. Patient was left intubated at the end of the case so SICU was consulted for hemodynamic monitoring. Taken back to the OR on 12/8 and underwent take down of Abthera, washout and re-application of the Abthera vac. Went back to OR on 12/10 night for colectomy with end ileostomy/ mucous fistula.    Lt PTX while on Bipap- now w/pigtail catheter placed improved oxygenation, comfort & aeration on 2L NC  Haldol x 1 given overnight for psychomotor agitation  Tachycardia, given amiodarone bolus with amiodarone infusion  Leukocytosis- bld cx sent  improved lactate & off pressors  (+) ostomy with liquid stool no gas noted-  ileostomy output of 2.4 liters.  no n/v after removal of NGT   CLD and possible advance  No f/c/s  no cp/ palp/ sob/ dyspnea/ abdominal pain        Diet:  Diet, Regular (12-16-19 @ 10:06)      Appetite: [  ]Poor [  ]Adequate [  ]Good  Caloric intake:  [   ]  Adequate   [   ] Inadequate    ROS: General/ GI see HPI  all other systems negative  pt unable to offer    ALLERGIES & MEDICATIONS  --------------------------------------------------------------------------------  No Known Allergies      STANDING INPATIENT MEDICATIONS    albuterol/ipratropium for Nebulization 3 milliLiter(s) Nebulizer every 6 hours  aMIOdarone Infusion 0.5 mG/Min IV Continuous <Continuous>  buDESOnide    Inhalation Suspension 0.5 milliGRAM(s) Inhalation every 12 hours  chlorhexidine 2% Cloths 1 Application(s) Topical <User Schedule>  chlorhexidine 4% Liquid 1 Application(s) Topical <User Schedule>  enoxaparin Injectable 40 milliGRAM(s) SubCutaneous daily  fat emulsion (Fish Oil and Plant Based) 20% Infusion 20.8 mL/Hr IV Continuous <Continuous>  insulin lispro (HumaLOG) corrective regimen sliding scale   SubCutaneous every 4 hours  Parenteral Nutrition - Adult 1 Each TPN Continuous <Continuous>  Parenteral Nutrition - Adult 1 Each TPN Continuous <Continuous>      PRN INPATIENT MEDICATION  ondansetron Injectable 4 milliGRAM(s) IV Push every 6 hours PRN  sodium chloride 0.9% lock flush 10 milliLiter(s) IV Push every 1 hour PRN        VITALS/PHYSICAL EXAM  --------------------------------------------------------------------------------  T(C): 36 (12-16-19 @ 11:00), Max: 37.2 (12-16-19 @ 03:00)  HR: 87 (12-16-19 @ 15:00) (80 - 138)  BP: 97/53 (12-16-19 @ 15:00) (63/40 - 143/68)  RR: 46 (12-16-19 @ 15:00) (19 - 47)  SpO2: 98% (12-16-19 @ 15:00) (94% - 100%)  Wt(kg): --        12-15-19 @ 07:01  -  12-16-19 @ 07:00  --------------------------------------------------------  IN: 3711.3 mL / OUT: 5145 mL / NET: -1433.7 mL    12-16-19 @ 07:01  -  12-16-19 @ 16:05  --------------------------------------------------------  IN: 1530.3 mL / OUT: 1670 mL / NET: -139.7 mL    PHYSICAL EXAM  --------------------------------------------------------------------------------  	Gen: guarded but stable, A&Ox3, NC O2   	HEENT: NC/AT, PERRL, supple neck, clear oropharynx, mucosa dry  	Pulm: decreased BS B/L, Lt sided pigtail  	GI: no BS, softly distended, appropriately tender,                    midline incision w/staples c/d/i w/o s/sx infection                   (+)ostomy pink viable- bilious liquid stool like material                   LLQ JOVANNA w/ serosanguinous drainage              MSK: FROM, no contractures nor deformities  	Vascular: Equally Warm, no clubbing, cyanosis, nor edema                        Rt PICC dressing c/d/I   	Neuro: No focal deficits, intact sensation, weakened strength  	Psych: Normal affect and mood  	Skin: Warm, without rashes, good turgor        LABS/ CULTURES/ RADIOLOGY:              10.4   19.99 >-----------<  311      [12-16-19 @ 00:35]              32.2     137  |  95  |  25  ----------------------------<  132      [12-16-19 @ 12:31]  4.2   |  29  |  0.59        Ca     8.8     [12-16-19 @ 12:31]      Mg     2.5     [12-16-19 @ 12:31]      Phos  4.0     [12-16-19 @ 12:31]    TPro  5.6  /  Alb  2.8  /  TBili  0.7  /  DBili  0.3  /  AST  16  /  ALT  24  /  AlkPhos  55  [12-16-19 @ 00:35]    PT/INR: PT 12.8 , INR 1.11       [12-15-19 @ 00:26]  PTT: 29.4       [12-15-19 @ 00:26]    CK 83      [12-15-19 @ 17:40]    Blood Gas Arterial - Calcium, Ionized: 1.10 mmoL/L (12-15-19 @ 22:22)  Blood Gas Arterial - Calcium, Ionized: 1.18 mmoL/L (12-15-19 @ 20:53)      CAPILLARY BLOOD GLUCOSE  POCT Blood Glucose.: 126 mg/dL (16 Dec 2019 15:50)  POCT Blood Glucose.: 160 mg/dL (16 Dec 2019 05:38)  POCT Blood Glucose.: 191 mg/dL (16 Dec 2019 03:33)  POCT Blood Glucose.: 221 mg/dL (16 Dec 2019 03:31)  POCT Blood Glucose.: 153 mg/dL (15 Dec 2019 21:33)  POCT Blood Glucose.: 109 mg/dL (15 Dec 2019 17:52)    Prealbumin, Serum: 5 mg/dL (12-13-19 @ 07:46)    Triglycerides, Serum: 107 mg/dL (12.16.19 @ 00:35)  Triglycerides, Serum: 122 mg/dL (12.15.19 @ 00:26)  Triglycerides, Serum: 93 mg/dL (12.13.19 @ 07:45)      Blood Gas Arterial, Lactate: 1.0 mmol/L (12.15.19 @ 22:22)    Hemoglobin A1C, Whole Blood (12.14.19 @ 05:37)    Hemoglobin A1C, Whole Blood: 5.6: Method: Immunoassay       Reference Range                4.0-5.6%       High risk (prediabetic)        5.7-6.4%       Diabetic, diagnostic             >=6.5%       ADA diabetic treatment goal       <7.0%  The Hemoglobin A1c testing is NGSP-certified.Reference ranges are based  upon the 2010 recommendations of  the American Diabetes Association.  Interpretation may vary for children  and adolescents. %         < from: Xray Chest 1 View- PORTABLE-Routine (12.16.19 @ 07:33) >  FINDINGS:     Right PICC line with the tip in the SVC. Left chest tube is unchanged in   position. The lungs are clear. There is residual small left   hydropneumothorax. Cardiac size within normal limits.    IMPRESSION:   Small left hydropneumothorax.    < end of copied text >

## 2019-12-16 NOTE — PROGRESS NOTE ADULT - SUBJECTIVE AND OBJECTIVE BOX
Subjective: Patient seen and examined. No new events except as noted.   24 HOUR EVENTS:  - Left sided spontaneous Pneumothorax, left sided pigtail catheter placed and noted improvement in oxygenation, patient comfort and aeration on CXR.  - Weaned off bipap with improved p02.  - Solumedrol discontinued, presumed COPD exacerbation, but in setting of pneumothorax with rapid improvement, not likely.   - Haldol x 1 given overnight for psychomotor agitation  - Tachycardia, given amiodarone bolus with amiodarone infusion    REVIEW OF SYSTEMS:    CONSTITUTIONAL: + weakness, fevers or chills  EYES/ENT: No visual changes;  No vertigo or throat pain   NECK: No pain or stiffness  RESPIRATORY: No cough, wheezing, hemoptysis; No shortness of breath  CARDIOVASCULAR: No chest pain or palpitations  GASTROINTESTINAL: No abdominal or epigastric pain. No nausea, vomiting, or hematemesis; No diarrhea or constipation. No melena or hematochezia.  GENITOURINARY: No dysuria, frequency or hematuria  NEUROLOGICAL: No numbness or weakness  SKIN: No itching, burning, rashes, or lesions   All other review of systems is negative unless indicated above.    MEDICATIONS:  MEDICATIONS  (STANDING):  albuterol/ipratropium for Nebulization 3 milliLiter(s) Nebulizer every 6 hours  aMIOdarone Infusion 0.5 mG/Min (16.667 mL/Hr) IV Continuous <Continuous>  buDESOnide    Inhalation Suspension 0.5 milliGRAM(s) Inhalation every 12 hours  chlorhexidine 2% Cloths 1 Application(s) Topical <User Schedule>  chlorhexidine 4% Liquid 1 Application(s) Topical <User Schedule>  enoxaparin Injectable 40 milliGRAM(s) SubCutaneous daily  fat emulsion (Fish Oil and Plant Based) 20% Infusion 20.8 mL/Hr (20.8 mL/Hr) IV Continuous <Continuous>  insulin lispro (HumaLOG) corrective regimen sliding scale   SubCutaneous every 4 hours  Parenteral Nutrition - Adult 1 Each (100 mL/Hr) TPN Continuous <Continuous>  Parenteral Nutrition - Adult 1 Each (100 mL/Hr) TPN Continuous <Continuous>      PHYSICAL EXAM:  T(C): 37.2 (12-16-19 @ 03:00), Max: 37.2 (12-16-19 @ 03:00)  HR: 88 (12-16-19 @ 10:00) (83 - 138)  BP: 109/56 (12-16-19 @ 10:00) (63/40 - 171/87)  RR: 30 (12-16-19 @ 10:00) (19 - 48)  SpO2: 98% (12-16-19 @ 10:00) (86% - 100%)  Wt(kg): --  I&O's Summary    15 Dec 2019 07:01  -  16 Dec 2019 07:00  --------------------------------------------------------  IN: 3711.3 mL / OUT: 5145 mL / NET: -1433.7 mL    16 Dec 2019 07:01  -  16 Dec 2019 11:23  --------------------------------------------------------  IN: 350.1 mL / OUT: 45 mL / NET: 305.1 mL          Appearance: Lethargic   HEENT:   Dry oral mucosa RIJ line   Lymphatic: No lymphadenopathy   Cardiovascular: Normal S1 S2, No JVD, No murmurs , Peripheral pulses palpable 2+ bilaterally  Respiratory: Decreased bs   Gastrointestinal:  Appropriately tender, +VAC  Skin: No rashes, No ecchymoses, No cyanosis, warm to touch  Musculoskeletal: Decreased  range of motion and strength  Psychiatry:  mildly sedated   Ext: No edema +PICC line   +rosas         LABS:    CARDIAC MARKERS:  CARDIAC MARKERS ( 15 Dec 2019 17:40 )  x     / x     / 83 U/L / x     / 3.1 ng/mL                                10.4   19.99 )-----------( 311      ( 16 Dec 2019 00:35 )             32.2     12-16    136  |  101  |  19  ----------------------------<  181<H>  4.3   |  25  |  0.60    Ca    8.3<L>      16 Dec 2019 00:35  Phos  4.0     12-16  Mg     1.9     12-16    TPro  5.6<L>  /  Alb  2.8<L>  /  TBili  0.7  /  DBili  0.3<H>  /  AST  16  /  ALT  24  /  AlkPhos  55  12-16    proBNP:   Lipid Profile:   HgA1c:   TSH:             TELEMETRY: 	  SR  ECG:  	  RADIOLOGY: < from: Xray Chest 1 View- PORTABLE-Routine (12.16.19 @ 07:33) >    EXAM:  XR CHEST PORTABLE ROUTINE 1V                            PROCEDURE DATE:  12/16/2019            INTERPRETATION:  CLINICAL INFORMATION: Follow-up pneumothorax.    TECHNIQUE: AP view of the chest 12/16/2019 7:29 AM.    COMPARISON: Chest radiograph 12/15/2019 7:55 PM.     FINDINGS:     Right PICC line with the tip in the SVC. Left chest tube is unchanged in   position. The lungs are clear. There is residual small left   hydropneumothorax. Cardiac size within normal limits.    IMPRESSION:   Small left hydropneumothorax.                GRICEL HERNÁNDEZ M.D., RADIOLOGY RESIDENT  This document has been electronically signed.  TRUDI DIETZ M.D., ATTENDING RADIOLOGIST  This document has been electronically signed. Dec 16 2019 10:44AM                < end of copied text >    DIAGNOSTIC TESTING:  [ ] Echocardiogram:  [ ]  Catheterization:  [ ] Stress Test:    OTHER:

## 2019-12-16 NOTE — PROGRESS NOTE ADULT - SUBJECTIVE AND OBJECTIVE BOX
HISTORY  74y Male with a past medical history of COPD and EtOH dependence who presented 12/6 with abdominal pain, nausea, hematemesis, and poor PO intake for ~2-3 days. Labs significant for an CHI w/ Cr 2.74 and lactate of 9.4. He was also notably tachycardic to the 110s and hypotensive w/ SBP in the 70s. He was given 2 units of PRBCs and 2 L of LR in the ED due to concern for upper GI bleeding. Imaging revealed high grade SBO in the RLQ. Patient was taken to the OR emergently for an exploratory laparotomy, lysis of adhesions, decompression of bowel via enterotomy w/ primary repair, and Abthera VAC placement. Of note, the distal 50% of the bowel appeared dusky but viable. He required vasopressor support with phenylephrine and vasopressin infusions. He received 3000 mL of crystalloid w/ EBL of 10 mL and UOP of 25 mL. Patient was left intubated at the end of the case so SICU was consulted for hemodynamic monitoring. Taken back to the OR on 12/8 and underwent take down of Abthera, washout and re-application of the Abthera vac. Went back to OR on 12/10 night for colectomy.    24 HOUR EVENTS:  - Left sided spontaneous Pneumothorax, left sided pigtail catheter placed and noted improvement in oxygenation, patient comfort and aeration on CXR.  - Weaned off bipap with improved p02.  - Solumedrol discontinued, presumed COPD exacerbation, but in setting of pneumothorax with rapid improvement, not likely.   - Haldol x 1 given overnight for psychomotor agitation  - Tachycardia, given amiodarone bolus with amiodarone infusion    SUBJECTIVE/ROS:  [x] A ten-point review of systems was otherwise negative except as noted.  [ ] Due to altered mental status/intubation, subjective information were not able to be obtained from the patient. History was obtained, to the extent possible, from review of the chart and collateral sources of information.      NEURO  RASS:     GCS:     CAM ICU:  Exam: A and O x 2, WILEY, FC  Meds: acetaminophen  IVPB .. 1000 milliGRAM(s) IV Intermittent every 6 hours PRN Moderate Pain (4 - 6)  ondansetron Injectable 4 milliGRAM(s) IV Push once  ondansetron Injectable 4 milliGRAM(s) IV Push every 6 hours PRN Nausea and/or Vomiting    [x] Adequacy of sedation and pain control has been assessed and adjusted      RESPIRATORY  RR: 23 (12-16-19 @ 02:00) (19 - 48)  SpO2: 100% (12-16-19 @ 02:00) (86% - 100%)  Wt(kg): --  Exam: unlabored, clear to auscultation bilaterally though overall diminished on the left  Mechanical Ventilation: N/A  ABG - ( 15 Dec 2019 22:22 )  pH: 7.42  /  pCO2: 42    /  pO2: 118   / HCO3: 27    / Base Excess: 2.4   /  SaO2: 99      Lactate: x                [ ] Extubation Readiness Assessed  Meds: albuterol/ipratropium for Nebulization 3 milliLiter(s) Nebulizer every 6 hours  buDESOnide    Inhalation Suspension 0.5 milliGRAM(s) Inhalation every 12 hours        CARDIOVASCULAR  HR: 90 (12-16-19 @ 02:00) (75 - 138)  BP: 99/52 (12-16-19 @ 02:00) (63/40 - 171/87)  BP(mean): 74 (12-16-19 @ 02:00) (48 - 121)  ABP: --  ABP(mean): --  Wt(kg): --  CVP(cm H2O): --      Exam: regular rate and rhythm, normal s1, s2  Cardiac Rhythm: Sinus tachycardia <> NSR  Perfusion     [x]Adequate   [ ]Inadequate  Mentation   [x]Normal       [ ]Reduced  Extremities  [x]Warm         [ ]Cool  Volume Status [ ]Hypervolemic [x]Euvolemic [ ]Hypovolemic  Meds: aMIOdarone Infusion 1 mG/Min IV Continuous <Continuous>  phenylephrine    Infusion 0.5 MICROgram(s)/kG/Min IV Continuous <Continuous>        GI/NUTRITION  Exam: Soft, NT, ND. Ostomy pink with output. Midline staples D/C/I  Diet: Clears  Meds:     GENITOURINARY  I&O's Detail    12-14 @ 07:01  -  12-15 @ 07:00  --------------------------------------------------------  IN:    dextrose 5% + lactated ringers.: 100 mL    dextrose 5% + lactated ringers.: 300 mL    dextrose 5% + lactated ringers.: 40 mL    Enteral Tube Flush: 20 mL    fat emulsion (Fish Oil and Plant Based) 20% Infusion: 135.2 mL    IV PiggyBack: 550 mL    ns in tub fed  [id number]: 360 mL    Solution: 100 mL    Solution: 600 mL    TPN (Total Parenteral Nutrition): 1400 mL  Total IN: 3605.2 mL    OUT:    Bulb: 410 mL    Ileostomy: 2500 mL    Incontinent per Condom Catheter: 4200 mL    Nasoenteral Tube: 250 mL  Total OUT: 7360 mL    Total NET: -3754.8 mL      12-15 @ 07:01  -  12-16 @ 02:25  --------------------------------------------------------  IN:    Albumin 5%  - 250 mL: 250 mL    amiodarone Infusion: 133.2 mL    fat emulsion (Fish Oil and Plant Based) 20% Infusion: 208 mL    IV PiggyBack: 100 mL    ns in tub fed  [id number]: 60 mL    phenylephrine   Infusion: 15.6 mL    Solution: 100 mL    Solution: 100 mL    TPN (Total Parenteral Nutrition): 1900 mL  Total IN: 2866.8 mL    OUT:    Bulb: 100 mL    Ileostomy: 1550 mL    Incontinent per Condom Catheter: 2050 mL  Total OUT: 3700 mL    Total NET: -833.2 mL          12-16    136  |  101  |  19  ----------------------------<  181<H>  4.3   |  25  |  0.60    Ca    8.3<L>      16 Dec 2019 00:35  Phos  4.0     12-16  Mg     1.9     12-16    TPro  5.6<L>  /  Alb  2.8<L>  /  TBili  0.7  /  DBili  0.3<H>  /  AST  16  /  ALT  24  /  AlkPhos  55  12-16    [ ] Martinez catheter, indication: N/A  Meds: fat emulsion (Fish Oil and Plant Based) 20% Infusion 20.8 mL/Hr IV Continuous <Continuous>  Parenteral Nutrition - Adult 1 Each TPN Continuous <Continuous>  sodium chloride 0.9% lock flush 10 milliLiter(s) IV Push every 1 hour PRN Pre/post blood products, medications, blood draw, and to maintain line patency        HEMATOLOGIC  Meds: enoxaparin Injectable 40 milliGRAM(s) SubCutaneous daily    [x] VTE Prophylaxis                        10.4   19.99 )-----------( 311      ( 16 Dec 2019 00:35 )             32.2     PT/INR - ( 15 Dec 2019 00:26 )   PT: 12.8 sec;   INR: 1.11 ratio         PTT - ( 15 Dec 2019 00:26 )  PTT:29.4 sec  Transfusion     [ ] PRBC   [ ] Platelets   [ ] FFP   [ ] Cryoprecipitate      INFECTIOUS DISEASES  T(C): 36.9 (12-15-19 @ 23:00), Max: 37.7 (12-15-19 @ 03:00)  Wt(kg): --  WBC Count: 19.99 K/uL (12-16 @ 00:35)  WBC Count: 15.23 K/uL (12-15 @ 12:42)    Recent Cultures:    Meds:       ENDOCRINE  Capillary Blood Glucose    Meds: insulin lispro (HumaLOG) corrective regimen sliding scale   SubCutaneous every 4 hours        ACCESS DEVICES:  [ ] Peripheral IV  [ ] Central Venous Line	[ ] R	[ ] L	[ ] IJ	[ ] Fem	[ ] SC	Placed:   [ ] Arterial Line		[ ] R	[ ] L	[ ] Fem	[ ] Rad	[ ] Ax	Placed:   [x] PICC:	 RUE 12/14				[ ] Mediport  [ ] Urinary Catheter, Date Placed:   [x] Necessity of urinary, arterial, and venous catheters discussed    OTHER MEDICATIONS:  chlorhexidine 2% Cloths 1 Application(s) Topical <User Schedule>  chlorhexidine 4% Liquid 1 Application(s) Topical <User Schedule>      CODE STATUS:     IMAGING:

## 2019-12-16 NOTE — PROGRESS NOTE ADULT - ASSESSMENT
75 y/o M presenting with septic shock and high grade SBO s/p exploratory laparotomy, lysis of adhesions, decompression of bowel via enterotomy w/ primary repair, and Abthera VAC placement on 12/6; s/p take down of Abthera, washout and re-application of the Abthera vac on 12/8. Now s/p SBR and end ileostomy on 12/10 acute respiratory distress now improving, Pneumothorax, hyperglycemia, delirium.    PLAN:    Neuro: A&Ox3   - off sedation and standing pain meds.   - Haldol x 1 overnight for delirium    Resp: acute respiratory failure upon initial presentation, likely undiagnosed COPD at baseline   - Now doing well on NC, weaned off Bi PAP  - Continue Duonebs  - Left sided pigtail to suction    CV: septic shock requiring vasopressor support, now hemodynamically stable   - Neosynephrine at low dose, wean to off.  - metoprolol 5mg q4 for rate control     GI: SBO s/p exploratory laparotomy, lysis of adhesions, decompression of bowel via enterotomy w/ primary repair, and Abthera VAC placement and removal with 150 cm of small bowel removed and end ileostomy.   - Started on TPN through PICC line  - Monitor ostomy output, tolerating clears  - Protonix for stress ulcer prophylaxis    Renal: CHI, resolved   - Replete electrolytes as needed.   - Voiding, no rosas    Heme: no acute issues  - Lovenox for VTE prophylaxis  - SCDs     ID: septic shock 2/2 intra abdominal infection  - Continue Zosyn per surgery team     Endo: no acute issues  - Started on sliding scale with initiation of TPN, q4 fingersticks.     Dispo: SICU level of care

## 2019-12-16 NOTE — CHART NOTE - NSCHARTNOTEFT_GEN_A_CORE
Nutrition Follow Up Note    Patient seen for: malnutrition/TPN follow up on 8ICU    Source: medical record, TPN Team rounds    Chart reviewed, events noted. "73 y/o M presenting with septic shock and high grade SBO s/p exploratory laparotomy, lysis of adhesions, decompression of bowel via enterotomy w/ primary repair, and Abthera VAC placement on ; s/p take down of Abthera, washout and re-application of the Abthera vac on . Now s/p SBR and end ileostomy on 12/10."    Nutrition Status: Severe Malnutrition. Pt NPO > 7 days S/P GI surgery x 3; 150 cm small bowel remaining. Pt extubated , with episode of emesis . NGT self-removed then replaced on . TPN Team consulted ; TPN initiated . Pt now advanced to Regular diet on , requests Ensure Enlive supplement. Pt noted with hyperglycemia; insulin in TPN increased to 10 units.    Diet (): Regular    Parenteral Nutrition: TPN (ordered 12/15) infusing at 100ml/hr (120Gm amino acids, 210Gm dextrose, 50Gm SMOF lipids) to provide 1694cal/day (31cal/Kg and 2.2Gm protein/Kg per dosing wt 54.2Kg); with 10ml MVI, 3ml MTE-5.     Non-Protein Calories: 1214 gregorio/day (22 gregorio/Kg)  Dextrose Infusion Rate: 2.7 mg/Kg/min  Lipid Infusion Rate: 0.92 Gm/Kg/day; 0.08 Gm/Kg/hr     Ileostomy output: 2400ml    Daily Weight in k.2 (12-13), Weight in k.4 (12-12), Weight in k.6 (12-11), Weight in k.3 (12-10)    Drug Dosing Weight  Weight (kg): 54.2 (06 Dec 2019 22:12)  BMI (kg/m2): 17.6 (09 Dec 2019 09:00)    Pertinent Medications: MEDICATIONS  (STANDING):  albuterol/ipratropium for Nebulization 3 milliLiter(s) Nebulizer every 6 hours  aMIOdarone Infusion 0.5 mG/Min (16.667 mL/Hr) IV Continuous <Continuous>  buDESOnide    Inhalation Suspension 0.5 milliGRAM(s) Inhalation every 12 hours  chlorhexidine 2% Cloths 1 Application(s) Topical <User Schedule>  chlorhexidine 4% Liquid 1 Application(s) Topical <User Schedule>  enoxaparin Injectable 40 milliGRAM(s) SubCutaneous daily  fat emulsion (Fish Oil and Plant Based) 20% Infusion 20.8 mL/Hr (20.8 mL/Hr) IV Continuous <Continuous>  insulin lispro (HumaLOG) corrective regimen sliding scale   SubCutaneous every 4 hours  Parenteral Nutrition - Adult 1 Each (100 mL/Hr) TPN Continuous <Continuous>  Parenteral Nutrition - Adult 1 Each (100 mL/Hr) TPN Continuous <Continuous>    MEDICATIONS  (PRN):  ondansetron Injectable 4 milliGRAM(s) IV Push every 6 hours PRN Nausea and/or Vomiting  sodium chloride 0.9% lock flush 10 milliLiter(s) IV Push every 1 hour PRN Pre/post blood products, medications, blood draw, and to maintain line patency    LABS:    @ 12:31: Sodium 137, Potassium 4.2, Chloride 95<L>, Calcium 8.8, Magnesium 2.5, Phosphorus 4.0, BUN 25<H>, Creatinine 0.59, <H>,    @ 00:35: Sodium 136, Potassium 4.3, Chloride 101, Calcium 8.3<L>, Magnesium 1.9, Phosphorus 4.0, BUN 19, Creatinine 0.60, <H>, Alk Phos 55, ALT/SGPT 24, AST/SGOT 16, , Total Protein 5.6<L>, Albumin 2.8<L>, Total Bilirubin 0.7, Direct Bilirubin 0.3<H>, Hemoglobin 10.4<L>, Hematocrit 32.2<L>    Triglycerides, Serum: 107 mg/dL (19 @ 00:35)  Triglycerides, Serum: 122 mg/dL (12-15-19 @ 00:26)  Triglycerides, Serum: 93 mg/dL (19 @ 07:45)    POCT Blood Glucose.: 160 mg/dL (16 Dec 2019 05:38)  POCT Blood Glucose.: 191 mg/dL (16 Dec 2019 03:33)  POCT Blood Glucose.: 221 mg/dL (16 Dec 2019 03:31)  POCT Blood Glucose.: 153 mg/dL (15 Dec 2019 21:33)  POCT Blood Glucose.: 109 mg/dL (15 Dec 2019 17:52)    Skin per nursing documentation: no pressure injuries noted  Edema: 1+ generalized    Estimated Needs: based on dosing wt 54.2Kg, with consideration for TPN, malnutrition  8125-6852 gregorio/day (25-30cal/Kg)   Gm protein/day (1.8-2.2Gm/Kg)     Previous Nutrition Diagnosis: Severe malnutrition  Nutrition Diagnosis is: ongoing, being addressed with TPN and diet adancement    New Nutrition Diagnosis: none     Interventions: monitor ability to reduce/discontinue TPN when po intake meets >75% of needs    Recommend  1) TPN per TPN Team/Nutrition assessment  2) Continue Regular diet  3) Add 3 servings chocolate Ensure Enlive (350cal, 20Gm protein per 8oz serving)     Monitoring and Evaluation:     Continue to monitor nutritional intake, tolerance to diet prescription, weights, labs, skin integrity.    RD remains available upon request and will follow up per protocol.    Sowmya Graham MS RD CDN [facility], Pager # [phone].

## 2019-12-16 NOTE — PROGRESS NOTE ADULT - ASSESSMENT
A/P: 74 year old male w/ PMH of COPD and EtOH dependence with PSH/o appy, prostate surgery, & spine surgery  septic shock and high grade SBO s/p exploratory laparotomy, lysis of adhesions, decompression of bowel via enterotomy w/ primary repair, and Abthera VAC placement on 12/6; s/p take down of Abthera, washout and re-application of the Abthera vac on 12/8. Now s/p SBR and end ileostomy on 12/10.   TPN consulted to assist w/ management of pt's nutrition in pt w/ prolonged hospital course who remains NPO w/NGT      TPN at full strength in pt w/ Protein-Calorie Malnutrition-      Pt with improved ostomy function & CLD tolerating - advance to LRD as tolerated    TPN recommendations from RD  120 grams amino acids (800ml 15% a.a.)  210 grams dextrose (300ml 70% dex)  50 grams SMOFlipid (250ml 20%IVFE @ 20.8ml/hr x 12 hours)      Total Calories: 1694 gregorio/day (31 gregorio/Kg per dosing wt 54.2Kg)  Total Protein: 120 Gm/day (2.2 Gm/Kg per dosing wt 54.2Kg)  Non-Protein Calories: 1214 gregorio/day (22 gregorio/Kg)  Dextrose Infusion Rate: 2.7 mg/Kg/min  Lipid Infusion Rate: 0.92 Gm/Kg/day; 0.08 Gm/Kg/hr   Micronutrients: 10ml MVI, 3ml MTE-5      PICC w/dedicated port for only TPN - maintenance as per protocol  Strict Intake and Output.  Weights three times a week  Monitor BMP, Mg, Ionized Ca, Phosphorus daily  Continue to monitor Triglycerides daily for first 3 days of TPN and can then monitor weekly when stable  Pre-albumin weekly  Hyperglycemia - HgA1c noted      TPN initiated w/  Insulin 5U and increased to 10U for elevated FS      Fingersticks to monitor glucose every 6 hours until stable, may be decreased to twice a day      ISS coverage - to be ordered by primary team  HypoK improving with KCl increased to 120mEq   Continue as per SICU / Surgery, will follow with you, D/w primary team    Andreina Hubbard PA-C  TPN team, pager [phone]  D/w Ana M Siegel

## 2019-12-16 NOTE — PROGRESS NOTE ADULT - SUBJECTIVE AND OBJECTIVE BOX
Surgery Progress Note  Patient is a 74y old  Male who presents with a chief complaint of abd. pain (16 Dec 2019 02:24)      SUBJECTIVE: Patient seen and examined at bedside with surgical team, patient without complaints. Moving OOB to chair. Has force expiratory air leak.     24 Hour Events Per SICU:  - Left sided spontaneous Pneumothorax, left sided pigtail catheter placed and noted improvement in oxygenation, patient comfort and aeration on CXR.  - Weaned off bipap with improved p02.  - Solumedrol discontinued, presumed COPD exacerbation, but in setting of pneumothorax with rapid improvement, not likely.   - Haldol x 1 given overnight for psychomotor agitation  - Tachycardia, given amiodarone bolus with amiodarone infusion    Vital Signs Last 24 Hrs  T(C): 37.2 (16 Dec 2019 03:00), Max: 37.7 (15 Dec 2019 08:00)  T(F): 99 (16 Dec 2019 03:00), Max: 99.9 (15 Dec 2019 08:00)  HR: 102 (16 Dec 2019 07:00) (76 - 138)  BP: 96/54 (16 Dec 2019 07:00) (63/40 - 171/87)  BP(mean): 71 (16 Dec 2019 07:00) (48 - 121)  RR: 25 (16 Dec 2019 07:00) (19 - 48)  SpO2: 97% (16 Dec 2019 07:00) (86% - 100%)    Physical Exam  Constitutional: NAD  Respiratory: left sided pigtail catheter in place, tachypnea on RA  Abd: Soft, NT, ND. Ostomy pink with output. Midline staples in place, midline incision site is c/d/i   Ext: moving all 4 ext spontaneously     I&O's Detail    15 Dec 2019 07:01  -  16 Dec 2019 07:00  --------------------------------------------------------  IN:    Albumin 5%  - 250 mL: 250 mL    amiodarone Infusion: 199.8 mL    amiodarone Infusion: 50.1 mL    fat emulsion (Fish Oil and Plant Based) 20% Infusion: 291.2 mL    IV PiggyBack: 200 mL    ns in tub fed  [id number]: 60 mL    phenylephrine   Infusion: 60.2 mL    Solution: 100 mL    Solution: 100 mL    TPN (Total Parenteral Nutrition): 2400 mL  Total IN: 3711.3 mL    OUT:    Bulb: 190 mL    Chest Tube: 55 mL    Ileostomy: 2400 mL    Incontinent per Condom Catheter: 2500 mL  Total OUT: 5145 mL    Total NET: -1433.7 mL      MEDICATIONS  (STANDING):  albuterol/ipratropium for Nebulization 3 milliLiter(s) Nebulizer every 6 hours  aMIOdarone Infusion 0.5 mG/Min (16.667 mL/Hr) IV Continuous <Continuous>  buDESOnide    Inhalation Suspension 0.5 milliGRAM(s) Inhalation every 12 hours  chlorhexidine 2% Cloths 1 Application(s) Topical <User Schedule>  chlorhexidine 4% Liquid 1 Application(s) Topical <User Schedule>  enoxaparin Injectable 40 milliGRAM(s) SubCutaneous daily  fat emulsion (Fish Oil and Plant Based) 20% Infusion 20.8 mL/Hr (20.8 mL/Hr) IV Continuous <Continuous>  insulin lispro (HumaLOG) corrective regimen sliding scale   SubCutaneous every 4 hours  ondansetron Injectable 4 milliGRAM(s) IV Push once  Parenteral Nutrition - Adult 1 Each (100 mL/Hr) TPN Continuous <Continuous>  phenylephrine    Infusion 0.5 MICROgram(s)/kG/Min (10.162 mL/Hr) IV Continuous <Continuous>    MEDICATIONS  (PRN):  acetaminophen  IVPB .. 1000 milliGRAM(s) IV Intermittent every 6 hours PRN Moderate Pain (4 - 6)  ondansetron Injectable 4 milliGRAM(s) IV Push every 6 hours PRN Nausea and/or Vomiting  sodium chloride 0.9% lock flush 10 milliLiter(s) IV Push every 1 hour PRN Pre/post blood products, medications, blood draw, and to maintain line patency      LABS:                        10.4   19.99 )-----------( 311      ( 16 Dec 2019 00:35 )             32.2     12-16    136  |  101  |  19  ----------------------------<  181<H>  4.3   |  25  |  0.60    Ca    8.3<L>      16 Dec 2019 00:35  Phos  4.0     12-16  Mg     1.9     12-16    TPro  5.6<L>  /  Alb  2.8<L>  /  TBili  0.7  /  DBili  0.3<H>  /  AST  16  /  ALT  24  /  AlkPhos  55  12-16    PT/INR - ( 15 Dec 2019 00:26 )   PT: 12.8 sec;   INR: 1.11 ratio         PTT - ( 15 Dec 2019 00:26 )  PTT:29.4 sec  LIVER FUNCTIONS - ( 16 Dec 2019 00:35 )  Alb: 2.8 g/dL / Pro: 5.6 g/dL / ALK PHOS: 55 U/L / ALT: 24 U/L / AST: 16 U/L / GGT: x

## 2019-12-17 LAB
ALBUMIN SERPL ELPH-MCNC: 2.9 G/DL — LOW (ref 3.3–5)
ALP SERPL-CCNC: 65 U/L — SIGNIFICANT CHANGE UP (ref 40–120)
ALT FLD-CCNC: 21 U/L — SIGNIFICANT CHANGE UP (ref 10–45)
ANION GAP SERPL CALC-SCNC: 12 MMOL/L — SIGNIFICANT CHANGE UP (ref 5–17)
ANION GAP SERPL CALC-SCNC: 17 MMOL/L — SIGNIFICANT CHANGE UP (ref 5–17)
AST SERPL-CCNC: 19 U/L — SIGNIFICANT CHANGE UP (ref 10–40)
BILIRUB DIRECT SERPL-MCNC: 0.2 MG/DL — SIGNIFICANT CHANGE UP (ref 0–0.2)
BILIRUB INDIRECT FLD-MCNC: 0.4 MG/DL — SIGNIFICANT CHANGE UP (ref 0.2–1)
BILIRUB SERPL-MCNC: 0.6 MG/DL — SIGNIFICANT CHANGE UP (ref 0.2–1.2)
BUN SERPL-MCNC: 34 MG/DL — HIGH (ref 7–23)
BUN SERPL-MCNC: 44 MG/DL — HIGH (ref 7–23)
CALCIUM SERPL-MCNC: 8.4 MG/DL — SIGNIFICANT CHANGE UP (ref 8.4–10.5)
CALCIUM SERPL-MCNC: 8.9 MG/DL — SIGNIFICANT CHANGE UP (ref 8.4–10.5)
CHLORIDE SERPL-SCNC: 88 MMOL/L — LOW (ref 96–108)
CHLORIDE SERPL-SCNC: 91 MMOL/L — LOW (ref 96–108)
CO2 SERPL-SCNC: 31 MMOL/L — SIGNIFICANT CHANGE UP (ref 22–31)
CO2 SERPL-SCNC: 32 MMOL/L — HIGH (ref 22–31)
CREAT SERPL-MCNC: 0.66 MG/DL — SIGNIFICANT CHANGE UP (ref 0.5–1.3)
CREAT SERPL-MCNC: 0.69 MG/DL — SIGNIFICANT CHANGE UP (ref 0.5–1.3)
GLUCOSE BLDC GLUCOMTR-MCNC: 113 MG/DL — HIGH (ref 70–99)
GLUCOSE BLDC GLUCOMTR-MCNC: 113 MG/DL — HIGH (ref 70–99)
GLUCOSE BLDC GLUCOMTR-MCNC: 120 MG/DL — HIGH (ref 70–99)
GLUCOSE BLDC GLUCOMTR-MCNC: 122 MG/DL — HIGH (ref 70–99)
GLUCOSE BLDC GLUCOMTR-MCNC: 137 MG/DL — HIGH (ref 70–99)
GLUCOSE SERPL-MCNC: 123 MG/DL — HIGH (ref 70–99)
GLUCOSE SERPL-MCNC: 127 MG/DL — HIGH (ref 70–99)
HCT VFR BLD CALC: 32.8 % — LOW (ref 39–50)
HGB BLD-MCNC: 11 G/DL — LOW (ref 13–17)
MAGNESIUM SERPL-MCNC: 2.3 MG/DL — SIGNIFICANT CHANGE UP (ref 1.6–2.6)
MAGNESIUM SERPL-MCNC: 2.5 MG/DL — SIGNIFICANT CHANGE UP (ref 1.6–2.6)
MCHC RBC-ENTMCNC: 30.5 PG — SIGNIFICANT CHANGE UP (ref 27–34)
MCHC RBC-ENTMCNC: 33.5 GM/DL — SIGNIFICANT CHANGE UP (ref 32–36)
MCV RBC AUTO: 90.9 FL — SIGNIFICANT CHANGE UP (ref 80–100)
NRBC # BLD: 0 /100 WBCS — SIGNIFICANT CHANGE UP (ref 0–0)
PHOSPHATE SERPL-MCNC: 4.1 MG/DL — SIGNIFICANT CHANGE UP (ref 2.5–4.5)
PHOSPHATE SERPL-MCNC: 5.7 MG/DL — HIGH (ref 2.5–4.5)
PLATELET # BLD AUTO: 375 K/UL — SIGNIFICANT CHANGE UP (ref 150–400)
POTASSIUM SERPL-MCNC: 4 MMOL/L — SIGNIFICANT CHANGE UP (ref 3.5–5.3)
POTASSIUM SERPL-MCNC: 4.2 MMOL/L — SIGNIFICANT CHANGE UP (ref 3.5–5.3)
POTASSIUM SERPL-SCNC: 4 MMOL/L — SIGNIFICANT CHANGE UP (ref 3.5–5.3)
POTASSIUM SERPL-SCNC: 4.2 MMOL/L — SIGNIFICANT CHANGE UP (ref 3.5–5.3)
PROT SERPL-MCNC: 5.9 G/DL — LOW (ref 6–8.3)
RBC # BLD: 3.61 M/UL — LOW (ref 4.2–5.8)
RBC # FLD: 13.9 % — SIGNIFICANT CHANGE UP (ref 10.3–14.5)
SODIUM SERPL-SCNC: 134 MMOL/L — LOW (ref 135–145)
SODIUM SERPL-SCNC: 137 MMOL/L — SIGNIFICANT CHANGE UP (ref 135–145)
TRIGL SERPL-MCNC: 131 MG/DL — SIGNIFICANT CHANGE UP (ref 10–149)
WBC # BLD: 18.85 K/UL — HIGH (ref 3.8–10.5)
WBC # FLD AUTO: 18.85 K/UL — HIGH (ref 3.8–10.5)

## 2019-12-17 PROCEDURE — 93010 ELECTROCARDIOGRAM REPORT: CPT

## 2019-12-17 PROCEDURE — 99232 SBSQ HOSP IP/OBS MODERATE 35: CPT

## 2019-12-17 PROCEDURE — 99232 SBSQ HOSP IP/OBS MODERATE 35: CPT | Mod: GC

## 2019-12-17 PROCEDURE — 71045 X-RAY EXAM CHEST 1 VIEW: CPT | Mod: 26

## 2019-12-17 RX ORDER — SODIUM CHLORIDE 9 MG/ML
1000 INJECTION, SOLUTION INTRAVENOUS
Refills: 0 | Status: DISCONTINUED | OUTPATIENT
Start: 2019-12-17 | End: 2019-12-18

## 2019-12-17 RX ORDER — METOPROLOL TARTRATE 50 MG
12.5 TABLET ORAL EVERY 12 HOURS
Refills: 0 | Status: DISCONTINUED | OUTPATIENT
Start: 2019-12-17 | End: 2019-12-17

## 2019-12-17 RX ORDER — ELECTROLYTE SOLUTION,INJ
1 VIAL (ML) INTRAVENOUS
Refills: 0 | Status: DISCONTINUED | OUTPATIENT
Start: 2019-12-17 | End: 2019-12-17

## 2019-12-17 RX ORDER — METOPROLOL TARTRATE 50 MG
12.5 TABLET ORAL EVERY 6 HOURS
Refills: 0 | Status: DISCONTINUED | OUTPATIENT
Start: 2019-12-17 | End: 2019-12-19

## 2019-12-17 RX ORDER — I.V. FAT EMULSION 20 G/100ML
20.8 EMULSION INTRAVENOUS
Qty: 50 | Refills: 0 | Status: DISCONTINUED | OUTPATIENT
Start: 2019-12-17 | End: 2019-12-18

## 2019-12-17 RX ORDER — METOPROLOL TARTRATE 50 MG
5 TABLET ORAL ONCE
Refills: 0 | Status: COMPLETED | OUTPATIENT
Start: 2019-12-17 | End: 2019-12-17

## 2019-12-17 RX ORDER — METOPROLOL TARTRATE 50 MG
25 TABLET ORAL EVERY 12 HOURS
Refills: 0 | Status: DISCONTINUED | OUTPATIENT
Start: 2019-12-17 | End: 2019-12-17

## 2019-12-17 RX ORDER — SODIUM CHLORIDE 9 MG/ML
500 INJECTION, SOLUTION INTRAVENOUS ONCE
Refills: 0 | Status: COMPLETED | OUTPATIENT
Start: 2019-12-17 | End: 2019-12-17

## 2019-12-17 RX ADMIN — Medication 3 MILLILITER(S): at 05:45

## 2019-12-17 RX ADMIN — Medication 3 MILLILITER(S): at 18:43

## 2019-12-17 RX ADMIN — CHLORHEXIDINE GLUCONATE 1 APPLICATION(S): 213 SOLUTION TOPICAL at 05:21

## 2019-12-17 RX ADMIN — Medication 1 EACH: at 22:19

## 2019-12-17 RX ADMIN — Medication 0.5 MILLIGRAM(S): at 05:45

## 2019-12-17 RX ADMIN — SODIUM CHLORIDE 30 MILLILITER(S): 9 INJECTION, SOLUTION INTRAVENOUS at 22:18

## 2019-12-17 RX ADMIN — Medication 0.5 MILLIGRAM(S): at 19:44

## 2019-12-17 RX ADMIN — Medication 3 MILLILITER(S): at 12:55

## 2019-12-17 RX ADMIN — SODIUM CHLORIDE 2000 MILLILITER(S): 9 INJECTION, SOLUTION INTRAVENOUS at 22:18

## 2019-12-17 RX ADMIN — ENOXAPARIN SODIUM 40 MILLIGRAM(S): 100 INJECTION SUBCUTANEOUS at 12:29

## 2019-12-17 RX ADMIN — I.V. FAT EMULSION 20.8 ML/HR: 20 EMULSION INTRAVENOUS at 22:19

## 2019-12-17 RX ADMIN — Medication 5 MILLIGRAM(S): at 06:57

## 2019-12-17 NOTE — PROGRESS NOTE ADULT - ATTENDING COMMENTS
ON: metop added doses for HTN, neg 2.4 liter neg mainly for 4.1 liter of ostomy  continues to have air leak and AM CXR same pneumohemothorax      - monitor HR, metop adjustment as needed  - monitor ostomy output, to give IV fluids if volume depleted  - TPN for one more day  - AM CXR  - OOB and ambulate as tolerated  - remains in SICU  - full code

## 2019-12-17 NOTE — PROGRESS NOTE ADULT - PROBLEM SELECTOR PLAN 1
s/p ex lap, MIRYAM, closure of enterotomy, abthera vac placement  s/p washout  s/p PICC line placement . On TPN    Stable

## 2019-12-17 NOTE — PROGRESS NOTE ADULT - ASSESSMENT
· Assessment	  74M s/p ex lap, MIRYAM, closure of enterotomy, abthera vac placement 12/8 early morning. RTOR for exlap/washout 12/9. s/p RTOR ex lap, resection of 150cm bowel, creation of colostomy. Course c/b spontaneous PTX s/p pigtail 12/15, pleuravac with known air leak.     Plan:  - Diet: LRD  - Pain medication: IV tylenol  - DVT ppx: Lovenox  - Activity: OOB  - supportive care per SICU  - TPN per SICU    Red Team Surgery  [id number] · Assessment	  74M s/p ex lap, MIRYAM, closure of enterotomy, abthera vac placement 12/8 early morning. RTOR for exlap/washout 12/9. s/p RTOR ex lap, resection of 150cm bowel, creation of colostomy. Course c/b spontaneous PTX s/p pigtail 12/15, pleuravac with known air leak.     Plan:  - Diet: LRD  - monitor ostomy output may need to slow down with lomotil  - Pain medication: IV tylenol  - DVT ppx: Lovenox  - Activity: OOB  - supportive care per SICU  - TPN per SICU    Red Team Surgery  [id number]

## 2019-12-17 NOTE — PROGRESS NOTE ADULT - SUBJECTIVE AND OBJECTIVE BOX
[facility] NUTRITION SUPPORT / TPN -- FOLLOW UP NOTE  --------------------------------------------------------------------------------    24 hour events/subjective:  74y Male with a past medical history of COPD and EtOH dependence who presented 12/6 with abdominal pain, nausea, hematemesis, and poor PO intake for ~2-3 days. Labs significant for an CHI w/ Cr 2.74 and lactate of 9.4. He was also notably tachycardic to the 110s and hypotensive w/ SBP in the 70s. He was given 2 units of PRBCs and 2 L of LR in the ED due to concern for upper GI bleeding. Imaging revealed high grade SBO in the RLQ. Patient was taken to the OR emergently for an exploratory laparotomy, lysis of adhesions, decompression of bowel via enterotomy w/ primary repair, and Abthera VAC placement. Of note, the distal 50% of the bowel appeared dusky but viable. He required vasopressor support with phenylephrine and vasopressin infusions. He received 3000 mL of crystalloid w/ EBL of 10 mL and UOP of 25 mL. Patient was left intubated at the end of the case so SICU was consulted for hemodynamic monitoring. Taken back to the OR on 12/8 and underwent take down of Abthera, washout and re-application of the Abthera vac. Went back to OR on 12/10 night for colectomy with end ileostomy/ mucous fistula.    Pt w/ Lt PTX - 12/15 - pigtail cath placed- pt doing well on RA      continues to have air leak and AM CXR shows PTX      no c/o sob/ palp/ dyspnea/ cough/ cp    Tolerating diet  no n/v   no abdominal pain  High ostomy output  monitor HR, metop adjustment as needed  OOB to chair      Diet:  Diet, Regular:   Supplement Feeding Modality:  Oral  Ensure Clear Cans or Servings Per Day:  1       Frequency:  Three Times a day (12-16-19 @ 16:14)      Appetite: [  ]Poor [ x ]Adequate [  ]Good  Caloric intake:  [  x ]  Adequate   [   ] Inadequate    ROS: General/ GI see HPI  all other systems negative      ALLERGIES & MEDICATIONS  --------------------------------------------------------------------------------  No Known Allergies    STANDING INPATIENT MEDICATIONS    albuterol/ipratropium for Nebulization 3 milliLiter(s) Nebulizer every 6 hours  buDESOnide    Inhalation Suspension 0.5 milliGRAM(s) Inhalation every 12 hours  chlorhexidine 2% Cloths 1 Application(s) Topical <User Schedule>  chlorhexidine 4% Liquid 1 Application(s) Topical <User Schedule>  enoxaparin Injectable 40 milliGRAM(s) SubCutaneous daily  fat emulsion (Fish Oil and Plant Based) 20% Infusion 20.8 mL/Hr IV Continuous <Continuous>  insulin lispro (HumaLOG) corrective regimen sliding scale   SubCutaneous every 4 hours  metoprolol tartrate 25 milliGRAM(s) Oral every 12 hours  Parenteral Nutrition - Adult 1 Each TPN Continuous <Continuous>  Parenteral Nutrition - Adult 1 Each TPN Continuous <Continuous>      PRN INPATIENT MEDICATION  ondansetron Injectable 4 milliGRAM(s) IV Push every 6 hours PRN  sodium chloride 0.9% lock flush 10 milliLiter(s) IV Push every 1 hour PRN        VITALS/PHYSICAL EXAM  --------------------------------------------------------------------------------  T(C): 36.9 (12-17-19 @ 11:00), Max: 36.9 (12-16-19 @ 15:00)  HR: 89 (12-17-19 @ 12:55) (82 - 109)  BP: 112/63 (12-17-19 @ 12:00) (85/50 - 112/63)  RR: 33 (12-17-19 @ 12:00) (24 - 46)  SpO2: 100% (12-17-19 @ 12:55) (95% - 100%)  Wt(kg): --        12-16-19 @ 07:01  -  12-17-19 @ 07:00  --------------------------------------------------------  IN: 3412.4 mL / OUT: 6690 mL / NET: -3277.6 mL    12-17-19 @ 07:01  -  12-17-19 @ 13:23  --------------------------------------------------------  IN: 500 mL / OUT: 0 mL / NET: 500 mL      PHYSICAL EXAM  --------------------------------------------------------------------------------  	Gen: guarded but stable, A&Ox3, NC O2   	HEENT: NC/AT, PERRL, supple neck, clear oropharynx, mucosa dry  	Pulm: decreased BS B/L, Lt sided pigtail  	GI: (+) BS, softly distended, non tender,                    midline incision w/staples c/d/i w/o s/sx infection                   (+)ostomy pink viable- thick bilious liquid stool like material                   LLQ JOVANNA w/ serosanguinous drainage              MSK: FROM, no contractures nor deformities  	Vascular: Equally Warm, no clubbing, cyanosis, nor edema                        Rt PICC dressing c/d/I   	Neuro: No focal deficits, intact sensation, weakened strength  	Psych: Normal affect and mood  	Skin: Warm, without rashes, good turgor      LABS/ CULTURES/ RADIOLOGY:              11.0   18.85 >-----------<  375      [12-17-19 @ 00:47]              32.8     134  |  91  |  34  ----------------------------<  123      [12-17-19 @ 00:47]  4.0   |  31  |  0.66        Ca     8.4     [12-17-19 @ 00:47]      Mg     2.3     [12-17-19 @ 00:47]      Phos  4.1     [12-17-19 @ 00:47]    TPro  5.9  /  Alb  2.9  /  TBili  0.6  /  DBili  0.2  /  AST  19  /  ALT  21  /  AlkPhos  65  [12-17-19 @ 00:47]    CK 83      [12-15-19 @ 17:40]    Blood Gas Arterial - Calcium, Ionized: 1.10 mmoL/L (12-15-19 @ 22:22)  Blood Gas Arterial - Calcium, Ionized: 1.18 mmoL/L (12-15-19 @ 20:53)    CAPILLARY BLOOD GLUCOSE  POCT Blood Glucose.: 113 mg/dL (17 Dec 2019 12:33)  POCT Blood Glucose.: 113 mg/dL (17 Dec 2019 05:20)  POCT Blood Glucose.: 122 mg/dL (17 Dec 2019 02:40)  POCT Blood Glucose.: 116 mg/dL (16 Dec 2019 21:58)  POCT Blood Glucose.: 125 mg/dL (16 Dec 2019 17:28)  POCT Blood Glucose.: 126 mg/dL (16 Dec 2019 15:50)    Prealbumin, Serum: 5 mg/dL (12-13-19 @ 07:46)    Triglycerides, Serum: 131 mg/dL (12.17.19 @ 00:47)  Triglycerides, Serum: 107 mg/dL (12.16.19 @ 00:35)  Triglycerides, Serum: 122 mg/dL (12.15.19 @ 00:26)  Triglycerides, Serum: 93 mg/dL (12.13.19 @ 07:45)    < from: Xray Chest 1 View- PORTABLE-Routine (12.17.19 @ 06:58) >  Impression:    The heart is normal in size. A left pigtail catheter is present and a   small  left hydropneumothorax not be ruled out entirely. The right lung   appears to be clear. A PICC line catheter is seen on the right and the   tip is in the superior vena cava.    < end of copied text >

## 2019-12-17 NOTE — PROGRESS NOTE ADULT - SUBJECTIVE AND OBJECTIVE BOX
GENERAL SURGERY PROGRESS NOTE    SUBJECTIVE  Patient seen and examined. No acute events overnight. Reports tolerating diet without nausea, vomiting, passing flatus, having bowel movements, voiding without issues, have been ambulating and out of bed. Denies fever, chills, SOB, chest pain.         OBJECTIVE    PHYSICAL EXAM  General: Appears well, NAD  CHEST: breathing comfortably  CV: appears well perfused  Abdomen: soft, nontender, nondistended, no rebound or guarding  Extremities: Grossly symmetric    T(C): 36.9 (12-17-19 @ 11:00), Max: 36.9 (12-16-19 @ 15:00)  HR: 104 (12-17-19 @ 14:00) (82 - 109)  BP: 103/57 (12-17-19 @ 14:00) (86/51 - 112/63)  RR: 34 (12-17-19 @ 14:00) (24 - 46)  SpO2: 97% (12-17-19 @ 14:00) (95% - 100%)    12-16-19 @ 07:01  -  12-17-19 @ 07:00  --------------------------------------------------------  IN: 3412.4 mL / OUT: 6690 mL / NET: -3277.6 mL    12-17-19 @ 07:01  -  12-17-19 @ 14:19  --------------------------------------------------------  IN: 700 mL / OUT: 30 mL / NET: 670 mL        MEDICATIONS  albuterol/ipratropium for Nebulization 3 milliLiter(s) Nebulizer every 6 hours  buDESOnide    Inhalation Suspension 0.5 milliGRAM(s) Inhalation every 12 hours  chlorhexidine 2% Cloths 1 Application(s) Topical <User Schedule>  chlorhexidine 4% Liquid 1 Application(s) Topical <User Schedule>  enoxaparin Injectable 40 milliGRAM(s) SubCutaneous daily  fat emulsion (Fish Oil and Plant Based) 20% Infusion 20.8 mL/Hr IV Continuous <Continuous>  insulin lispro (HumaLOG) corrective regimen sliding scale   SubCutaneous every 4 hours  metoprolol tartrate 25 milliGRAM(s) Oral every 12 hours  ondansetron Injectable 4 milliGRAM(s) IV Push every 6 hours PRN  Parenteral Nutrition - Adult 1 Each TPN Continuous <Continuous>  Parenteral Nutrition - Adult 1 Each TPN Continuous <Continuous>  sodium chloride 0.9% lock flush 10 milliLiter(s) IV Push every 1 hour PRN      LABS                        11.0   18.85 )-----------( 375      ( 17 Dec 2019 00:47 )             32.8     12-17    137  |  88<L>  |  44<H>  ----------------------------<  127<H>  4.2   |  32<H>  |  0.69    Ca    8.9      17 Dec 2019 12:59  Phos  5.7     12-17  Mg     2.5     12-17    TPro  5.9<L>  /  Alb  2.9<L>  /  TBili  0.6  /  DBili  0.2  /  AST  19  /  ALT  21  /  AlkPhos  65  12-17          RADIOLOGY & ADDITIONAL STUDIES GENERAL SURGERY PROGRESS NOTE    SUBJECTIVE  Patient seen and examined. No acute events overnight. Reports tolerating diet without nausea, vomiting, +/+ ostomy, CT with expiratory air leak. Denies fever, chills, SOB, chest pain.         OBJECTIVE    PHYSICAL EXAM  General: Appears well, NAD  CHEST: breathing comfortably, pigtail +air leak  CV: appears well perfused  Abdomen: soft, nontender, nondistended, no rebound or guarding, +/+ ostomy,   Extremities: Grossly symmetric    T(C): 36.9 (12-17-19 @ 11:00), Max: 36.9 (12-16-19 @ 15:00)  HR: 104 (12-17-19 @ 14:00) (82 - 109)  BP: 103/57 (12-17-19 @ 14:00) (86/51 - 112/63)  RR: 34 (12-17-19 @ 14:00) (24 - 46)  SpO2: 97% (12-17-19 @ 14:00) (95% - 100%)    12-16-19 @ 07:01  -  12-17-19 @ 07:00  --------------------------------------------------------  IN: 3412.4 mL / OUT: 6690 mL / NET: -3277.6 mL    12-17-19 @ 07:01  -  12-17-19 @ 14:19  --------------------------------------------------------  IN: 700 mL / OUT: 30 mL / NET: 670 mL        MEDICATIONS  albuterol/ipratropium for Nebulization 3 milliLiter(s) Nebulizer every 6 hours  buDESOnide    Inhalation Suspension 0.5 milliGRAM(s) Inhalation every 12 hours  chlorhexidine 2% Cloths 1 Application(s) Topical <User Schedule>  chlorhexidine 4% Liquid 1 Application(s) Topical <User Schedule>  enoxaparin Injectable 40 milliGRAM(s) SubCutaneous daily  fat emulsion (Fish Oil and Plant Based) 20% Infusion 20.8 mL/Hr IV Continuous <Continuous>  insulin lispro (HumaLOG) corrective regimen sliding scale   SubCutaneous every 4 hours  metoprolol tartrate 25 milliGRAM(s) Oral every 12 hours  ondansetron Injectable 4 milliGRAM(s) IV Push every 6 hours PRN  Parenteral Nutrition - Adult 1 Each TPN Continuous <Continuous>  Parenteral Nutrition - Adult 1 Each TPN Continuous <Continuous>  sodium chloride 0.9% lock flush 10 milliLiter(s) IV Push every 1 hour PRN      LABS                        11.0   18.85 )-----------( 375      ( 17 Dec 2019 00:47 )             32.8     12-17    137  |  88<L>  |  44<H>  ----------------------------<  127<H>  4.2   |  32<H>  |  0.69    Ca    8.9      17 Dec 2019 12:59  Phos  5.7     12-17  Mg     2.5     12-17    TPro  5.9<L>  /  Alb  2.9<L>  /  TBili  0.6  /  DBili  0.2  /  AST  19  /  ALT  21  /  AlkPhos  65  12-17          RADIOLOGY & ADDITIONAL STUDIES

## 2019-12-17 NOTE — PROGRESS NOTE ADULT - ASSESSMENT
73 y/o M presenting with septic shock and high grade SBO s/p exploratory laparotomy, lysis of adhesions, decompression of bowel via enterotomy w/ primary repair, and Abthera VAC placement on 12/6; s/p take down of Abthera, washout and re-application of the Abthera vac on 12/8. Now s/p SBR and end ileostomy on 12/10 acute respiratory distress now improving, Pneumothorax, hyperglycemia, delirium.    PLAN:    Neuro: A&Ox3   - off sedation and standing pain meds.     Resp: acute respiratory failure upon initial presentation, COPD at baseline, spontaneous left sided pneumothorax s/p pig-tail catheter placement.   - Now doing well on NC, weaned off Bi PAP  - Continue Duonebs  - Left sided pigtail to suction    CV: septic shock requiring vasopressor support, now hemodynamically stable   - metoprolol 5mg q4 for rate control     GI: SBO s/p exploratory laparotomy, lysis of adhesions, decompression of bowel via enterotomy w/ primary repair, and Abthera VAC placement and removal with 150 cm of small bowel removed and end ileostomy.   - Started on TPN through PICC line  - Monitor ostomy output, tolerating clears  - Protonix for stress ulcer prophylaxis    Renal: CHI, resolved   - Replete electrolytes as needed.   - Voiding, no rosas    Heme: no acute issues  - Lovenox for VTE prophylaxis  - SCDs     ID: No active issues       Endo: no acute issues  - Started on sliding scale with initiation of TPN, q4 fingersticks.     Dispo: SICU level of care    - Buster Bishop PA-C

## 2019-12-17 NOTE — PROGRESS NOTE ADULT - SUBJECTIVE AND OBJECTIVE BOX
HISTORY  74y Male past medical history of COPD and EtOH dependence who presented 12/6 with abdominal pain, nausea, hematemesis, and poor PO intake for ~2-3 days. Labs significant for an CHI w/ Cr 2.74 and lactate of 9.4. He was also notably tachycardic to the 110s and hypotensive w/ SBP in the 70s. He was given 2 units of PRBCs and 2 L of LR in the ED due to concern for upper GI bleeding. Imaging revealed high grade SBO in the RLQ. Patient was taken to the OR emergently for an exploratory laparotomy, lysis of adhesions, decompression of bowel via enterotomy w/ primary repair, and Abthera VAC placement. Of note, the distal 50% of the bowel appeared dusky but viable. He required vasopressor support with phenylephrine and vasopressin infusions. He received 3000 mL of crystalloid w/ EBL of 10 mL and UOP of 25 mL. Patient was left intubated at the end of the case so SICU was consulted for hemodynamic monitoring. Taken back to the OR on 12/8 and underwent take down of Abthera, washout and re-application of the Abthera vac. Went back to OR on 12/10 night for colectomy.      24 HOUR EVENTS:  - Amiodarone infusion discontinued  - Pig-tail remains to suction  - 12.5 BID lopressor started     SUBJECTIVE/ROS:  [ ] A ten-point review of systems was otherwise negative except as noted.  [ ] Due to altered mental status/intubation, subjective information were not able to be obtained from the patient. History was obtained, to the extent possible, from review of the chart and collateral sources of information.      NEURO  RASS:     GCS:     CAM ICU:  Exam: awake, alert, oriented  Meds: ondansetron Injectable 4 milliGRAM(s) IV Push every 6 hours PRN Nausea and/or Vomiting    [x] Adequacy of sedation and pain control has been assessed and adjusted      RESPIRATORY  RR: 25 (12-17-19 @ 01:00) (23 - 46)  SpO2: 96% (12-17-19 @ 01:00) (95% - 100%)  Wt(kg): --  Exam: unlabored, clear to auscultation bilaterally  Mechanical Ventilation:   ABG - ( 15 Dec 2019 22:22 )  pH: 7.42  /  pCO2: 42    /  pO2: 118   / HCO3: 27    / Base Excess: 2.4   /  SaO2: 99      Lactate: x                [N/A] Extubation Readiness Assessed  Meds: albuterol/ipratropium for Nebulization 3 milliLiter(s) Nebulizer every 6 hours  buDESOnide    Inhalation Suspension 0.5 milliGRAM(s) Inhalation every 12 hours        CARDIOVASCULAR  HR: 96 (12-17-19 @ 01:00) (80 - 117)  BP: 94/53 (12-17-19 @ 01:00) (85/50 - 132/60)  BP(mean): 69 (12-17-19 @ 01:00) (63 - 86)  ABP: --  ABP(mean): --  Wt(kg): --  CVP(cm H2O): --      Exam: regular rate and rhythm  Cardiac Rhythm: sinus  Perfusion     [x]Adequate   [ ]Inadequate  Mentation   [x]Normal       [ ]Reduced  Extremities  [x]Warm         [ ]Cool  Volume Status [ ]Hypervolemic [x]Euvolemic [ ]Hypovolemic  Meds: metoprolol tartrate 12.5 milliGRAM(s) Oral every 12 hours        GI/NUTRITION  Exam: soft, nontender, nondistended, incision C/D/I  Diet:  Meds:     GENITOURINARY  I&O's Detail    12-15 @ 07:01  -  12-16 @ 07:00  --------------------------------------------------------  IN:    Albumin 5%  - 250 mL: 250 mL    amiodarone Infusion: 199.8 mL    amiodarone Infusion: 50.1 mL    fat emulsion (Fish Oil and Plant Based) 20% Infusion: 291.2 mL    IV PiggyBack: 200 mL    ns in tub fed  [id number]: 60 mL    phenylephrine   Infusion: 60.2 mL    Solution: 100 mL    Solution: 100 mL    TPN (Total Parenteral Nutrition): 2400 mL  Total IN: 3711.3 mL    OUT:    Bulb: 190 mL    Chest Tube: 55 mL    Ileostomy: 2400 mL    Incontinent per Condom Catheter: 2500 mL  Total OUT: 5145 mL    Total NET: -1433.7 mL      12-16 @ 07:01  -  12-17 @ 01:52  --------------------------------------------------------  IN:    amiodarone Infusion: 183.7 mL    amiodarone Infusion: 83.5 mL    fat emulsion (Fish Oil and Plant Based) 20% Infusion: 161.2 mL    Oral Fluid: 480 mL    TPN (Total Parenteral Nutrition): 1800 mL  Total IN: 2708.4 mL    OUT:    Bulb: 45 mL    Chest Tube: 65 mL    Ileostomy: 3550 mL    Incontinent per Condom Catheter: 1300 mL  Total OUT: 4960 mL    Total NET: -2251.6 mL          12-17    134<L>  |  91<L>  |  34<H>  ----------------------------<  123<H>  4.0   |  31  |  0.66    Ca    8.4      17 Dec 2019 00:47  Phos  4.1     12-17  Mg     2.3     12-17    TPro  5.9<L>  /  Alb  2.9<L>  /  TBili  0.6  /  DBili  0.2  /  AST  19  /  ALT  21  /  AlkPhos  65  12-17    [ ] Martinez catheter, indication: N/A  Meds: fat emulsion (Fish Oil and Plant Based) 20% Infusion 20.8 mL/Hr IV Continuous <Continuous>  Parenteral Nutrition - Adult 1 Each TPN Continuous <Continuous>  sodium chloride 0.9% lock flush 10 milliLiter(s) IV Push every 1 hour PRN Pre/post blood products, medications, blood draw, and to maintain line patency        HEMATOLOGIC  Meds: enoxaparin Injectable 40 milliGRAM(s) SubCutaneous daily    [x] VTE Prophylaxis                        11.0   18.85 )-----------( 375      ( 17 Dec 2019 00:47 )             32.8       Transfusion     [ ] PRBC   [ ] Platelets   [ ] FFP   [ ] Cryoprecipitate      INFECTIOUS DISEASES  WBC Count: 18.85 K/uL (12-17 @ 00:47)    RECENT CULTURES:    Meds:       ENDOCRINE  CAPILLARY BLOOD GLUCOSE      POCT Blood Glucose.: 116 mg/dL (16 Dec 2019 21:58)  POCT Blood Glucose.: 125 mg/dL (16 Dec 2019 17:28)  POCT Blood Glucose.: 126 mg/dL (16 Dec 2019 15:50)  POCT Blood Glucose.: 160 mg/dL (16 Dec 2019 05:38)  POCT Blood Glucose.: 191 mg/dL (16 Dec 2019 03:33)  POCT Blood Glucose.: 221 mg/dL (16 Dec 2019 03:31)    Meds: insulin lispro (HumaLOG) corrective regimen sliding scale   SubCutaneous every 4 hours        ACCESS DEVICES:  [ ] Peripheral IV  [ ] Central Venous Line	[ ] R	[ ] L	[ ] IJ	[ ] Fem	[ ] SC	Placed:   [ ] Arterial Line		[ ] R	[ ] L	[ ] Fem	[ ] Rad	[ ] Ax	Placed:   [ ] PICC:					[ ] Mediport  [ ] Urinary Catheter, Date Placed:   [x] Necessity of urinary, arterial, and venous catheters discussed    OTHER MEDICATIONS:  chlorhexidine 2% Cloths 1 Application(s) Topical <User Schedule>  chlorhexidine 4% Liquid 1 Application(s) Topical <User Schedule>      CODE STATUS:      IMAGING: HISTORY  74y Male past medical history of COPD and EtOH dependence who presented 12/6 with abdominal pain, nausea, hematemesis, and poor PO intake for ~2-3 days. Labs significant for an CHI w/ Cr 2.74 and lactate of 9.4. He was also notably tachycardic to the 110s and hypotensive w/ SBP in the 70s. He was given 2 units of PRBCs and 2 L of LR in the ED due to concern for upper GI bleeding. Imaging revealed high grade SBO in the RLQ. Patient was taken to the OR emergently for an exploratory laparotomy, lysis of adhesions, decompression of bowel via enterotomy w/ primary repair, and Abthera VAC placement. Of note, the distal 50% of the bowel appeared dusky but viable. He required vasopressor support with phenylephrine and vasopressin infusions. He received 3000 mL of crystalloid w/ EBL of 10 mL and UOP of 25 mL. Patient was left intubated at the end of the case so SICU was consulted for hemodynamic monitoring. Taken back to the OR on 12/8 and underwent take down of Abthera, washout and re-application of the Abthera vac. Went back to OR on 12/10 night for colectomy.      24 HOUR EVENTS:  - Amiodarone infusion discontinued  - Pig-tail remains to suction  - 12.5 BID lopressor started     SUBJECTIVE/ROS:  [ ] A ten-point review of systems was otherwise negative except as noted.  [ ] Due to altered mental status/intubation, subjective information were not able to be obtained from the patient. History was obtained, to the extent possible, from review of the chart and collateral sources of information.      NEURO  Exam: awake, alert, oriented  Meds: ondansetron Injectable 4 milliGRAM(s) IV Push every 6 hours PRN Nausea and/or Vomiting    [x] Adequacy of sedation and pain control has been assessed and adjusted      RESPIRATORY  RR: 25 (12-17-19 @ 01:00) (23 - 46)  SpO2: 96% (12-17-19 @ 01:00) (95% - 100%)  Exam: unlabored, clear to auscultation bilaterally  Mechanical Ventilation: none  ABG - ( 15 Dec 2019 22:22 )  pH: 7.42  /  pCO2: 42    /  pO2: 118   / HCO3: 27    / Base Excess: 2.4   /  SaO2: 99      Lactate: x      [N/A] Extubation Readiness Assessed  Meds: albuterol/ipratropium for Nebulization 3 milliLiter(s) Nebulizer every 6 hours  buDESOnide    Inhalation Suspension 0.5 milliGRAM(s) Inhalation every 12 hours        CARDIOVASCULAR  HR: 96 (12-17-19 @ 01:00) (80 - 117)  BP: 94/53 (12-17-19 @ 01:00) (85/50 - 132/60)  BP(mean): 69 (12-17-19 @ 01:00) (63 - 86)  Exam: regular rate and rhythm  Cardiac Rhythm: sinus  Perfusion     [x]Adequate   [ ]Inadequate  Mentation   [x]Normal       [ ]Reduced  Extremities  [x]Warm         [ ]Cool  Volume Status [ ]Hypervolemic [x]Euvolemic [ ]Hypovolemic  Meds: metoprolol tartrate 12.5 milliGRAM(s) Oral every 12 hours        GI/NUTRITION  Exam: soft, nontender, nondistended  Diet: Regular diet   Meds: none    GENITOURINARY  I&O's Detail    12-15 @ 07:01  -  12-16 @ 07:00  --------------------------------------------------------  IN:    Albumin 5%  - 250 mL: 250 mL    amiodarone Infusion: 199.8 mL    amiodarone Infusion: 50.1 mL    fat emulsion (Fish Oil and Plant Based) 20% Infusion: 291.2 mL    IV PiggyBack: 200 mL    ns in tub fed  [id number]: 60 mL    phenylephrine   Infusion: 60.2 mL    Solution: 100 mL    Solution: 100 mL    TPN (Total Parenteral Nutrition): 2400 mL  Total IN: 3711.3 mL    OUT:    Bulb: 190 mL    Chest Tube: 55 mL    Ileostomy: 2400 mL    Incontinent per Condom Catheter: 2500 mL  Total OUT: 5145 mL    Total NET: -1433.7 mL      12-16 @ 07:01  -  12-17 @ 01:52  --------------------------------------------------------  IN:    amiodarone Infusion: 183.7 mL    amiodarone Infusion: 83.5 mL    fat emulsion (Fish Oil and Plant Based) 20% Infusion: 161.2 mL    Oral Fluid: 480 mL    TPN (Total Parenteral Nutrition): 1800 mL  Total IN: 2708.4 mL    OUT:    Bulb: 45 mL    Chest Tube: 65 mL    Ileostomy: 3550 mL    Incontinent per Condom Catheter: 1300 mL  Total OUT: 4960 mL    Total NET: -2251.6 mL          12-17    134<L>  |  91<L>  |  34<H>  ----------------------------<  123<H>  4.0   |  31  |  0.66    Ca    8.4      17 Dec 2019 00:47  Phos  4.1     12-17  Mg     2.3     12-17    TPro  5.9<L>  /  Alb  2.9<L>  /  TBili  0.6  /  DBili  0.2  /  AST  19  /  ALT  21  /  AlkPhos  65  12-17    [ ] Martinez catheter, indication: N/A  Meds: fat emulsion (Fish Oil and Plant Based) 20% Infusion 20.8 mL/Hr IV Continuous <Continuous>  Parenteral Nutrition - Adult 1 Each TPN Continuous <Continuous>  sodium chloride 0.9% lock flush 10 milliLiter(s) IV Push every 1 hour PRN Pre/post blood products, medications, blood draw, and to maintain line patency        HEMATOLOGIC  Meds: enoxaparin Injectable 40 milliGRAM(s) SubCutaneous daily    [x] VTE Prophylaxis                        11.0   18.85 )-----------( 375      ( 17 Dec 2019 00:47 )             32.8       Transfusion     [ ] PRBC   [ ] Platelets   [ ] FFP   [ ] Cryoprecipitate      INFECTIOUS DISEASES  WBC Count: 18.85 K/uL (12-17 @ 00:47)    RECENT CULTURES: none    Meds: none      ENDOCRINE  CAPILLARY BLOOD GLUCOSE      POCT Blood Glucose.: 116 mg/dL (16 Dec 2019 21:58)  POCT Blood Glucose.: 125 mg/dL (16 Dec 2019 17:28)  POCT Blood Glucose.: 126 mg/dL (16 Dec 2019 15:50)  POCT Blood Glucose.: 160 mg/dL (16 Dec 2019 05:38)  POCT Blood Glucose.: 191 mg/dL (16 Dec 2019 03:33)  POCT Blood Glucose.: 221 mg/dL (16 Dec 2019 03:31)    Meds: insulin lispro (HumaLOG) corrective regimen sliding scale   SubCutaneous every 4 hours        ACCESS DEVICES:  [x] Peripheral IV  [ ] Central Venous Line	[ ] R	[ ] L	[ ] IJ	[ ] Fem	[ ] SC	Placed:   [ ] Arterial Line		[ ] R	[ ] L	[ ] Fem	[ ] Rad	[ ] Ax	Placed:   [ ] PICC:					[ ] Mediport  [ ] Urinary Catheter, Date Placed:   [x] Necessity of urinary, arterial, and venous catheters discussed    OTHER MEDICATIONS:  chlorhexidine 2% Cloths 1 Application(s) Topical <User Schedule>  chlorhexidine 4% Liquid 1 Application(s) Topical <User Schedule>      CODE STATUS: full code      IMAGING: < from: CT Abdomen and Pelvis No Cont (12.06.19 @ 16:48) >  FINDINGS:    LOWER CHEST: Within normal limits.    LIVER: Within normal limits.  BILE DUCTS: Normal caliber.  GALLBLADDER: Within normal limits.  SPLEEN: Within normal limits.  PANCREAS: Within normal limits.  ADRENALS: Within normal limits.  KIDNEYS/URETERS: No hydronephrosis. Nonobstructing bilateral renal   calculi.    BLADDER: Within normal limits.  REPRODUCTIVE ORGANS: Prostatectomy.    BOWEL: High-grade small bowel obstruction with transition point in the   right lower abdomen (series 602, image 34).  PERITONEUM: No ascites.  VESSELS: Atherosclerotic changes.  RETROPERITONEUM/LYMPH NODES: No lymphadenopathy.  ABDOMINAL WALL: Within normal limits.  BONES: Degenerative changes.    IMPRESSION:     High-grade small bowel obstruction with transition point in the right   lower quadrant.            < end of copied text >

## 2019-12-17 NOTE — PROGRESS NOTE ADULT - ASSESSMENT
A/P: 74 year old male w/ PMH of COPD and EtOH dependence with PSH/o appy, prostate surgery, & spine surgery  septic shock and high grade SBO s/p exploratory laparotomy, lysis of adhesions, decompression of bowel via enterotomy w/ primary repair, and Abthera VAC placement on 12/6; s/p take down of Abthera, washout and re-application of the Abthera vac on 12/8. Now s/p SBR and end ileostomy on 12/10.   TPN consulted to assist w/ management of pt's nutrition in pt w/ prolonged hospital course now tolerating diet but has high Ileostomy output       TPN at full strength in pt w/ severe Protein-Calorie Malnutrition-      diet advanced  &  tolerating LRD - high ostomy output noted    TPN recommendations from RD  120 grams amino acids (800ml 15% a.a.)  210 grams dextrose (300ml 70% dex)  50 grams SMOFlipid (250ml 20%IVFE @ 20.8ml/hr x 12 hours)      Total Calories: 1694 gregorio/day (31 gregorio/Kg per dosing wt 54.2Kg)  Total Protein: 120 Gm/day (2.2 Gm/Kg per dosing wt 54.2Kg)  Non-Protein Calories: 1214 gregorio/day (22 gregoroi/Kg)  Dextrose Infusion Rate: 2.7 mg/Kg/min  Lipid Infusion Rate: 0.92 Gm/Kg/day; 0.08 Gm/Kg/hr   Micronutrients: 10ml MVI, 3ml MTE-5      PICC w/dedicated port for only TPN - maintenance as per protocol  Strict Intake and Output- high ileostomy output -     will continue to monitor- prior to initiating medications to decrease diarrhea/ increase fluid reabsorption     diet just advanced - and output may slow down     TPN to help manage fluid losses while providing needed nutrition in this malnourished pt  Weights three times a week  Monitor BMP, Mg, Ionized Ca, Phosphorus daily  Continue to monitor Triglycerides daily for first 3 days of TPN and can then monitor weekly when stable  Pre-albumin weekly  Hyperglycemia - stable with 10U Insulin for elevated FS      HgA1c noted      Fingersticks to monitor glucose every 6 hours until stable, may be decreased to twice a day      ISS coverage - to be ordered by primary team  HypoK improving with KCl increased to 120mEq   Continue as per SICU / Surgery, will follow with you, D/w primary team    Andreina Hubbard PA-C  TPN team, pager [phone]  D/w Ana M Siegel

## 2019-12-17 NOTE — PROGRESS NOTE ADULT - SUBJECTIVE AND OBJECTIVE BOX
Subjective: Patient seen and examined. No new events except as noted.   remains in ICU   feels ok   24 HOUR EVENTS:  - Amiodarone infusion discontinued  - Pig-tail remains to suction  - 12.5 BID lopressor started     REVIEW OF SYSTEMS:    CONSTITUTIONAL:+ weakness, fevers or chills  EYES/ENT: No visual changes;  No vertigo or throat pain   NECK: No pain or stiffness  RESPIRATORY: No cough, wheezing, hemoptysis; No shortness of breath  CARDIOVASCULAR: No chest pain or palpitations  GASTROINTESTINAL: No abdominal or epigastric pain. No nausea, vomiting, or hematemesis; No diarrhea or constipation. No melena or hematochezia.  GENITOURINARY: No dysuria, frequency or hematuria  NEUROLOGICAL: No numbness or weakness  SKIN: No itching, burning, rashes, or lesions   All other review of systems is negative unless indicated above.    MEDICATIONS:  MEDICATIONS  (STANDING):  albuterol/ipratropium for Nebulization 3 milliLiter(s) Nebulizer every 6 hours  buDESOnide    Inhalation Suspension 0.5 milliGRAM(s) Inhalation every 12 hours  chlorhexidine 2% Cloths 1 Application(s) Topical <User Schedule>  chlorhexidine 4% Liquid 1 Application(s) Topical <User Schedule>  enoxaparin Injectable 40 milliGRAM(s) SubCutaneous daily  fat emulsion (Fish Oil and Plant Based) 20% Infusion 20.8 mL/Hr (20.8 mL/Hr) IV Continuous <Continuous>  insulin lispro (HumaLOG) corrective regimen sliding scale   SubCutaneous every 4 hours  metoprolol tartrate 25 milliGRAM(s) Oral every 12 hours  Parenteral Nutrition - Adult 1 Each (100 mL/Hr) TPN Continuous <Continuous>  Parenteral Nutrition - Adult 1 Each (100 mL/Hr) TPN Continuous <Continuous>      PHYSICAL EXAM:  T(C): 36.9 (12-17-19 @ 11:00), Max: 36.9 (12-16-19 @ 15:00)  HR: 99 (12-17-19 @ 11:00) (80 - 107)  BP: 87/56 (12-17-19 @ 11:00) (85/50 - 106/57)  RR: 27 (12-17-19 @ 11:00) (24 - 46)  SpO2: 100% (12-17-19 @ 11:00) (95% - 100%)  Wt(kg): --  I&O's Summary    16 Dec 2019 07:01  -  17 Dec 2019 07:00  --------------------------------------------------------  IN: 3412.4 mL / OUT: 6690 mL / NET: -3277.6 mL    17 Dec 2019 07:01  -  17 Dec 2019 11:43  --------------------------------------------------------  IN: 400 mL / OUT: 0 mL / NET: 400 mL          Appearance: Lethargic   HEENT:   Dry oral mucosa RIJ line   Lymphatic: No lymphadenopathy   Cardiovascular: Normal S1 S2, No JVD, No murmurs , Peripheral pulses palpable 2+ bilaterally  Respiratory: Decreased bs   Gastrointestinal:  Soft, NT, ND. Ostomy pink with output. Midline staples in place, midline incision site is c/d/i   Skin: No rashes, No ecchymoses, No cyanosis, warm to touch  Musculoskeletal: Decreased  range of motion and strength  Psychiatry:  mildly sedated   Ext: No edema +PICC line   +rosas       LABS:    CARDIAC MARKERS:  CARDIAC MARKERS ( 15 Dec 2019 17:40 )  x     / x     / 83 U/L / x     / 3.1 ng/mL                                11.0   18.85 )-----------( 375      ( 17 Dec 2019 00:47 )             32.8     12-17    134<L>  |  91<L>  |  34<H>  ----------------------------<  123<H>  4.0   |  31  |  0.66    Ca    8.4      17 Dec 2019 00:47  Phos  4.1     12-17  Mg     2.3     12-17    TPro  5.9<L>  /  Alb  2.9<L>  /  TBili  0.6  /  DBili  0.2  /  AST  19  /  ALT  21  /  AlkPhos  65  12-17    proBNP:   Lipid Profile:   HgA1c:   TSH:             TELEMETRY: 	SR    ECG:  	  RADIOLOGY:  < from: Xray Chest 1 View- PORTABLE-Routine (12.17.19 @ 06:58) >    EXAM:  XR CHEST PORTABLE ROUTINE 1V                            PROCEDURE DATE:  12/17/2019            INTERPRETATION:  A single chest x-ray was obtained on December 17, 2019.    Indication: Position of left pigtail catheter.    Impression:    The heart is normal in size. A left pigtail catheter is present and a   small  left hydropneumothorax not be ruled out entirely. The right lung   appears to be clear. A PICC line catheter is seen on the right and the   tip is in the superior vena cava.                    TRUDI DIETZ M.D., ATTENDING RADIOLOGIST  This document has been electronically signed. Dec 17 2019  9:12AM                < end of copied text >    DIAGNOSTIC TESTING:  [ ] Echocardiogram:  [ ]  Catheterization:  [ ] Stress Test:    OTHER:

## 2019-12-18 LAB
ALBUMIN SERPL ELPH-MCNC: 2.8 G/DL — LOW (ref 3.3–5)
ALP SERPL-CCNC: 86 U/L — SIGNIFICANT CHANGE UP (ref 40–120)
ALT FLD-CCNC: 23 U/L — SIGNIFICANT CHANGE UP (ref 10–45)
ANION GAP SERPL CALC-SCNC: 10 MMOL/L — SIGNIFICANT CHANGE UP (ref 5–17)
ANION GAP SERPL CALC-SCNC: 11 MMOL/L — SIGNIFICANT CHANGE UP (ref 5–17)
AST SERPL-CCNC: 21 U/L — SIGNIFICANT CHANGE UP (ref 10–40)
BILIRUB DIRECT SERPL-MCNC: 0.4 MG/DL — HIGH (ref 0–0.2)
BILIRUB INDIRECT FLD-MCNC: 0.5 MG/DL — SIGNIFICANT CHANGE UP (ref 0.2–1)
BILIRUB SERPL-MCNC: 0.9 MG/DL — SIGNIFICANT CHANGE UP (ref 0.2–1.2)
BUN SERPL-MCNC: 52 MG/DL — HIGH (ref 7–23)
BUN SERPL-MCNC: 59 MG/DL — HIGH (ref 7–23)
CALCIUM SERPL-MCNC: 8.1 MG/DL — LOW (ref 8.4–10.5)
CALCIUM SERPL-MCNC: 8.2 MG/DL — LOW (ref 8.4–10.5)
CHLORIDE SERPL-SCNC: 84 MMOL/L — LOW (ref 96–108)
CHLORIDE SERPL-SCNC: 87 MMOL/L — LOW (ref 96–108)
CO2 SERPL-SCNC: 34 MMOL/L — HIGH (ref 22–31)
CO2 SERPL-SCNC: 34 MMOL/L — HIGH (ref 22–31)
CREAT SERPL-MCNC: 0.67 MG/DL — SIGNIFICANT CHANGE UP (ref 0.5–1.3)
CREAT SERPL-MCNC: 0.84 MG/DL — SIGNIFICANT CHANGE UP (ref 0.5–1.3)
GLUCOSE BLDC GLUCOMTR-MCNC: 112 MG/DL — HIGH (ref 70–99)
GLUCOSE BLDC GLUCOMTR-MCNC: 132 MG/DL — HIGH (ref 70–99)
GLUCOSE BLDC GLUCOMTR-MCNC: 147 MG/DL — HIGH (ref 70–99)
GLUCOSE SERPL-MCNC: 134 MG/DL — HIGH (ref 70–99)
GLUCOSE SERPL-MCNC: 139 MG/DL — HIGH (ref 70–99)
HCT VFR BLD CALC: 32.9 % — LOW (ref 39–50)
HGB BLD-MCNC: 11.1 G/DL — LOW (ref 13–17)
MAGNESIUM SERPL-MCNC: 2.4 MG/DL — SIGNIFICANT CHANGE UP (ref 1.6–2.6)
MAGNESIUM SERPL-MCNC: 2.5 MG/DL — SIGNIFICANT CHANGE UP (ref 1.6–2.6)
MCHC RBC-ENTMCNC: 30.2 PG — SIGNIFICANT CHANGE UP (ref 27–34)
MCHC RBC-ENTMCNC: 33.7 GM/DL — SIGNIFICANT CHANGE UP (ref 32–36)
MCV RBC AUTO: 89.4 FL — SIGNIFICANT CHANGE UP (ref 80–100)
NRBC # BLD: 0 /100 WBCS — SIGNIFICANT CHANGE UP (ref 0–0)
PHOSPHATE SERPL-MCNC: 4.7 MG/DL — HIGH (ref 2.5–4.5)
PHOSPHATE SERPL-MCNC: 5.2 MG/DL — HIGH (ref 2.5–4.5)
PLATELET # BLD AUTO: 429 K/UL — HIGH (ref 150–400)
POTASSIUM SERPL-MCNC: 3.6 MMOL/L — SIGNIFICANT CHANGE UP (ref 3.5–5.3)
POTASSIUM SERPL-MCNC: 4.3 MMOL/L — SIGNIFICANT CHANGE UP (ref 3.5–5.3)
POTASSIUM SERPL-SCNC: 3.6 MMOL/L — SIGNIFICANT CHANGE UP (ref 3.5–5.3)
POTASSIUM SERPL-SCNC: 4.3 MMOL/L — SIGNIFICANT CHANGE UP (ref 3.5–5.3)
PROT SERPL-MCNC: 6.1 G/DL — SIGNIFICANT CHANGE UP (ref 6–8.3)
RBC # BLD: 3.68 M/UL — LOW (ref 4.2–5.8)
RBC # FLD: 13.6 % — SIGNIFICANT CHANGE UP (ref 10.3–14.5)
SODIUM SERPL-SCNC: 128 MMOL/L — LOW (ref 135–145)
SODIUM SERPL-SCNC: 132 MMOL/L — LOW (ref 135–145)
WBC # BLD: 19.29 K/UL — HIGH (ref 3.8–10.5)
WBC # FLD AUTO: 19.29 K/UL — HIGH (ref 3.8–10.5)

## 2019-12-18 PROCEDURE — 99232 SBSQ HOSP IP/OBS MODERATE 35: CPT | Mod: GC

## 2019-12-18 PROCEDURE — 71045 X-RAY EXAM CHEST 1 VIEW: CPT | Mod: 26

## 2019-12-18 PROCEDURE — 99232 SBSQ HOSP IP/OBS MODERATE 35: CPT

## 2019-12-18 PROCEDURE — 71045 X-RAY EXAM CHEST 1 VIEW: CPT | Mod: 26,77,76

## 2019-12-18 RX ORDER — ELECTROLYTE SOLUTION,INJ
1 VIAL (ML) INTRAVENOUS
Refills: 0 | Status: DISCONTINUED | OUTPATIENT
Start: 2019-12-18 | End: 2019-12-18

## 2019-12-18 RX ORDER — POTASSIUM CHLORIDE 20 MEQ
20 PACKET (EA) ORAL
Refills: 0 | Status: DISCONTINUED | OUTPATIENT
Start: 2019-12-18 | End: 2019-12-18

## 2019-12-18 RX ORDER — SODIUM CHLORIDE 9 MG/ML
1000 INJECTION INTRAMUSCULAR; INTRAVENOUS; SUBCUTANEOUS
Refills: 0 | Status: DISCONTINUED | OUTPATIENT
Start: 2019-12-18 | End: 2019-12-19

## 2019-12-18 RX ORDER — I.V. FAT EMULSION 20 G/100ML
10.3 EMULSION INTRAVENOUS
Qty: 25 | Refills: 0 | Status: DISCONTINUED | OUTPATIENT
Start: 2019-12-18 | End: 2019-12-19

## 2019-12-18 RX ORDER — INSULIN LISPRO 100/ML
VIAL (ML) SUBCUTANEOUS EVERY 6 HOURS
Refills: 0 | Status: DISCONTINUED | OUTPATIENT
Start: 2019-12-18 | End: 2019-12-24

## 2019-12-18 RX ORDER — DIPHENOXYLATE HCL/ATROPINE 2.5-.025MG
1 TABLET ORAL EVERY 6 HOURS
Refills: 0 | Status: DISCONTINUED | OUTPATIENT
Start: 2019-12-18 | End: 2019-12-19

## 2019-12-18 RX ORDER — POTASSIUM CHLORIDE 20 MEQ
20 PACKET (EA) ORAL
Refills: 0 | Status: COMPLETED | OUTPATIENT
Start: 2019-12-18 | End: 2019-12-18

## 2019-12-18 RX ADMIN — Medication 50 MILLIEQUIVALENT(S): at 01:54

## 2019-12-18 RX ADMIN — Medication 0.5 MILLIGRAM(S): at 17:02

## 2019-12-18 RX ADMIN — Medication 3 MILLILITER(S): at 00:39

## 2019-12-18 RX ADMIN — Medication 1 TABLET(S): at 12:07

## 2019-12-18 RX ADMIN — I.V. FAT EMULSION 10.3 ML/HR: 20 EMULSION INTRAVENOUS at 17:09

## 2019-12-18 RX ADMIN — CHLORHEXIDINE GLUCONATE 1 APPLICATION(S): 213 SOLUTION TOPICAL at 05:56

## 2019-12-18 RX ADMIN — Medication 3 MILLILITER(S): at 17:02

## 2019-12-18 RX ADMIN — Medication 3 MILLILITER(S): at 11:25

## 2019-12-18 RX ADMIN — Medication 1 TABLET(S): at 17:28

## 2019-12-18 RX ADMIN — Medication 1 EACH: at 17:09

## 2019-12-18 RX ADMIN — Medication 50 MILLIEQUIVALENT(S): at 03:45

## 2019-12-18 RX ADMIN — ENOXAPARIN SODIUM 40 MILLIGRAM(S): 100 INJECTION SUBCUTANEOUS at 12:00

## 2019-12-18 RX ADMIN — Medication 50 MILLIEQUIVALENT(S): at 01:22

## 2019-12-18 RX ADMIN — Medication 3 MILLILITER(S): at 05:43

## 2019-12-18 RX ADMIN — Medication 12.5 MILLIGRAM(S): at 05:55

## 2019-12-18 RX ADMIN — Medication 1 EACH: at 19:53

## 2019-12-18 RX ADMIN — Medication 0.5 MILLIGRAM(S): at 05:43

## 2019-12-18 RX ADMIN — I.V. FAT EMULSION 10.3 ML/HR: 20 EMULSION INTRAVENOUS at 19:53

## 2019-12-18 RX ADMIN — Medication 12.5 MILLIGRAM(S): at 18:46

## 2019-12-18 RX ADMIN — CHLORHEXIDINE GLUCONATE 1 APPLICATION(S): 213 SOLUTION TOPICAL at 05:55

## 2019-12-18 NOTE — PROGRESS NOTE ADULT - ATTENDING COMMENTS
pt seen and examined.  agree with note above.  pt resting comfortably in bed, nad  tolerating diet, ostomy still with elevated output greater than target 1-1.5 liters/day  cont antidiarrheal agent to slow motility  f/u labs in am  if wbc remains elevated consider ct C/A/P in am  pigtail management per SICU   continue supportive care per SICU  d/w pt and SICU team in detail   pt remains in guarded condition.

## 2019-12-18 NOTE — PROGRESS NOTE ADULT - SUBJECTIVE AND OBJECTIVE BOX
Subjective: Patient seen and examined. No new events except as noted.   remains in ICU   feels ok   no cp or sob   24 HOUR EVENTS:  - Received 500ml bolus of plasma-lyte in the evening to replete high ostomy output. Plasma-lyte @ 30ml/hr started as well.   - Lopressor adjusted to 12.5 q6 hrs    REVIEW OF SYSTEMS:    CONSTITUTIONAL: + weakness, fevers or chills  EYES/ENT: No visual changes;  No vertigo or throat pain   NECK: No pain or stiffness  RESPIRATORY: No cough, wheezing, hemoptysis; No shortness of breath  CARDIOVASCULAR: No chest pain or palpitations  GASTROINTESTINAL: No abdominal or epigastric pain. No nausea, vomiting, or hematemesis; No diarrhea or constipation. No melena or hematochezia.  GENITOURINARY: No dysuria, frequency or hematuria  NEUROLOGICAL: No numbness or weakness  SKIN: No itching, burning, rashes, or lesions   All other review of systems is negative unless indicated above.    MEDICATIONS:  MEDICATIONS  (STANDING):  albuterol/ipratropium for Nebulization 3 milliLiter(s) Nebulizer every 6 hours  buDESOnide    Inhalation Suspension 0.5 milliGRAM(s) Inhalation every 12 hours  chlorhexidine 2% Cloths 1 Application(s) Topical <User Schedule>  chlorhexidine 4% Liquid 1 Application(s) Topical <User Schedule>  enoxaparin Injectable 40 milliGRAM(s) SubCutaneous daily  fat emulsion (Fish Oil and Plant Based) 20% Infusion 20.8 mL/Hr (20.8 mL/Hr) IV Continuous <Continuous>  insulin lispro (HumaLOG) corrective regimen sliding scale   SubCutaneous every 6 hours  metoprolol tartrate 12.5 milliGRAM(s) Oral every 6 hours  multiple electrolytes Injection Type 1 1000 milliLiter(s) (30 mL/Hr) IV Continuous <Continuous>  Parenteral Nutrition - Adult 1 Each (100 mL/Hr) TPN Continuous <Continuous>      PHYSICAL EXAM:  T(C): 37.2 (12-18-19 @ 11:00), Max: 37.2 (12-17-19 @ 23:00)  HR: 100 (12-18-19 @ 11:00) (89 - 116)  BP: 93/54 (12-18-19 @ 11:00) (86/57 - 113/58)  RR: 36 (12-18-19 @ 11:00) (22 - 42)  SpO2: 95% (12-18-19 @ 11:00) (93% - 100%)  Wt(kg): --  I&O's Summary    17 Dec 2019 07:01  -  18 Dec 2019 07:00  --------------------------------------------------------  IN: 3661.2 mL / OUT: 3375 mL / NET: 286.2 mL    18 Dec 2019 07:01  -  18 Dec 2019 11:12  --------------------------------------------------------  IN: 520 mL / OUT: 750 mL / NET: -230 mL      Appearance: Lethargic   HEENT:   Dry oral mucosa RIJ line   Lymphatic: No lymphadenopathy   Cardiovascular: Normal S1 S2, No JVD, No murmurs , Peripheral pulses palpable 2+ bilaterally  Respiratory: Decreased bs   Gastrointestinal:  Soft, NT, ND. Ostomy pink with output. Midline staples in place, midline incision site is c/d/i   Skin: No rashes, No ecchymoses, No cyanosis, warm to touch  Musculoskeletal: Decreased  range of motion and strength  Psychiatry:  mildly sedated   Ext: No edema +PICC line   +rosas       LABS:    CARDIAC MARKERS:  CARDIAC MARKERS ( 15 Dec 2019 17:40 )  x     / x     / 83 U/L / x     / 3.1 ng/mL                                11.1   19.29 )-----------( 429      ( 18 Dec 2019 00:24 )             32.9     12-18    132<L>  |  87<L>  |  52<H>  ----------------------------<  134<H>  3.6   |  34<H>  |  0.67    Ca    8.2<L>      18 Dec 2019 00:24  Phos  4.7     12-18  Mg     2.4     12-18    TPro  6.1  /  Alb  2.8<L>  /  TBili  0.9  /  DBili  0.4<H>  /  AST  21  /  ALT  23  /  AlkPhos  86  12-18    proBNP:   Lipid Profile:   HgA1c:   TSH:             TELEMETRY: 	 SR   ECG:  	  RADIOLOGY:   < from: Xray Chest 1 View- PORTABLE-Routine (12.18.19 @ 06:56) >    EXAM:  XR CHEST PORTABLE ROUTINE 1V                            PROCEDURE DATE:  12/18/2019            INTERPRETATION:  CLINICAL INFORMATION: Follow-up left chest tube.    TECHNIQUE: AP view of the chest 12/18/2019 6:52 AM.    COMPARISON: Chest radiograph 12/17/2019 6:53 AM.    FINDINGS:     Right PICC line with the tip in the SVC. Left chest tube is unchanged in   position.  The lungs are clear. There is a small left pneumothorax. There are no   pleural effusions.  Cardiac size is within normallimits.  The visualized osseous structures demonstrate no acute abnormality.    IMPRESSION:   Small left pneumothorax.                GRICEL HERNÁNDEZ M.D., RADIOLOGY RESIDENT  This document has been electronically signed.  TRUDI DIETZ M.D., ATTENDING RADIOLOGIST  This document has been electronically signed. Dec 18 2019 10:23AM                < end of copied text >    DIAGNOSTIC TESTING:  [ ] Echocardiogram:  [ ]  Catheterization:  [ ] Stress Test:    OTHER:

## 2019-12-18 NOTE — PROGRESS NOTE ADULT - ASSESSMENT
75 y/o M presenting with septic shock and high grade SBO s/p exploratory laparotomy, lysis of adhesions, decompression of bowel via enterotomy w/ primary repair, and Abthera VAC placement on 12/6; s/p take down of Abthera, washout and re-application of the Abthera vac on 12/8. Now s/p SBR and end ileostomy on 12/10 acute respiratory distress now improving, Pneumothorax, hyperglycemia, delirium.    PLAN:    Neuro: A&Ox3   - off sedation and standing pain meds.     Resp: acute respiratory failure upon initial presentation, COPD at baseline, spontaneous left sided pneumothorax s/p pig-tail catheter placement.   - Now doing well on NC, weaned off Bi PAP  - Continue Duonebs  - Left sided pigtail to suction    CV: septic shock requiring vasopressor support, now hemodynamically stable   - metoprolol 12.5 q6 hrs     GI: SBO s/p exploratory laparotomy, lysis of adhesions, decompression of bowel via enterotomy w/ primary repair, and Abthera VAC placement and removal with 150 cm of small bowel removed and end ileostomy.   - Started on TPN through PICC line  - Monitor ostomy output, tolerating clears  - Protonix for stress ulcer prophylaxis    Renal: CHI, resolved   - Replete electrolytes as needed.   - Voiding, no rosas    Heme: no acute issues  - Lovenox for VTE prophylaxis  - SCDs     ID: No active issues       Endo: no acute issues  - Started on sliding scale with initiation of TPN, q6 fingersticks     Dispo: SICU level of care    - Buster Bishop PA-C

## 2019-12-18 NOTE — PROGRESS NOTE ADULT - ATTENDING COMMENTS
ON:  ileostomy 2.4 lit, net pos after IV bolus as well as maintenance to compensate for output  chest tube air leak persists, output only 10cc    - start antimotility  - cont IV fluids to keep even  - cont TPN  - trial of CT to suction, repeat XRay in 4 hours  - cont resp toilet  - cont antibiotics to complete course  - remains in SICU

## 2019-12-18 NOTE — PROGRESS NOTE ADULT - SUBJECTIVE AND OBJECTIVE BOX
RED SURGERY DAILY PROGRESS NOTE:       SUBJECTIVE/ROS: Patient seen and examined at bedside.  Patient states he feels well, and reports his breathing is much better.  He is tolerating a regular diet but having increased ileostomy output (24oocc/24hr).       24 HOUR EVENTS:  - Received 500ml bolus of plasma-lyte in the evening to replete high ostomy output. Plasma-lyte @ 30ml/hr started as well.   - Lopressor adjusted to 12.5 q6 hrs     MEDICATIONS  (STANDING):  albuterol/ipratropium for Nebulization 3 milliLiter(s) Nebulizer every 6 hours  buDESOnide    Inhalation Suspension 0.5 milliGRAM(s) Inhalation every 12 hours  chlorhexidine 2% Cloths 1 Application(s) Topical <User Schedule>  chlorhexidine 4% Liquid 1 Application(s) Topical <User Schedule>  enoxaparin Injectable 40 milliGRAM(s) SubCutaneous daily  fat emulsion (Fish Oil and Plant Based) 20% Infusion 20.8 mL/Hr (20.8 mL/Hr) IV Continuous <Continuous>  insulin lispro (HumaLOG) corrective regimen sliding scale   SubCutaneous every 6 hours  metoprolol tartrate 12.5 milliGRAM(s) Oral every 6 hours  multiple electrolytes Injection Type 1 1000 milliLiter(s) (30 mL/Hr) IV Continuous <Continuous>  Parenteral Nutrition - Adult 1 Each (100 mL/Hr) TPN Continuous <Continuous>    MEDICATIONS  (PRN):  ondansetron Injectable 4 milliGRAM(s) IV Push every 6 hours PRN Nausea and/or Vomiting  sodium chloride 0.9% lock flush 10 milliLiter(s) IV Push every 1 hour PRN Pre/post blood products, medications, blood draw, and to maintain line patency      OBJECTIVE:    Vital Signs Last 24 Hrs  T(C): 36.6 (18 Dec 2019 07:00), Max: 37.2 (17 Dec 2019 23:00)  T(F): 97.9 (18 Dec 2019 07:00), Max: 99 (17 Dec 2019 23:00)  HR: 108 (18 Dec 2019 09:00) (89 - 116)  BP: 95/58 (18 Dec 2019 09:00) (86/57 - 113/58)  BP(mean): 71 (18 Dec 2019 09:00) (64 - 81)  RR: 38 (18 Dec 2019 09:00) (22 - 42)  SpO2: 96% (18 Dec 2019 09:00) (93% - 100%)        I&O's Detail    17 Dec 2019 07:01  -  18 Dec 2019 07:00  --------------------------------------------------------  IN:    fat emulsion (Fish Oil and Plant Based) 20% Infusion: 291.2 mL    IV PiggyBack: 200 mL    multiple electrolytes Injection Type 1: 270 mL    multiple electrolytes Injection Type 1 Bolus: 500 mL    TPN (Total Parenteral Nutrition): 2400 mL  Total IN: 3661.2 mL    OUT:    Bulb: 65 mL    Chest Tube: 10 mL    Ileostomy: 2400 mL    Incontinent per Condom Catheter: 900 mL  Total OUT: 3375 mL    Total NET: 286.2 mL      18 Dec 2019 07:01  -  18 Dec 2019 09:59  --------------------------------------------------------  IN:    multiple electrolytes Injection Type 1: 60 mL    TPN (Total Parenteral Nutrition): 200 mL  Total IN: 260 mL    OUT:    Ileostomy: 750 mL  Total OUT: 750 mL    Total NET: -490 mL          Daily     Daily Weight in k.3 (18 Dec 2019 00:00)    LABS:                        11.1   19.29 )-----------( 429      ( 18 Dec 2019 00:24 )             32.9     18    132<L>  |  87<L>  |  52<H>  ----------------------------<  134<H>  3.6   |  34<H>  |  0.67    Ca    8.2<L>      18 Dec 2019 00:24  Phos  4.7     18  Mg     2.4         TPro  6.1  /  Alb  2.8<L>  /  TBili  0.9  /  DBili  0.4<H>  /  AST  21  /  ALT  23  /  AlkPhos  86  18                  PHYSICAL EXAM:  General: Appears well, NAD  CHEST: breathing comfortably, pigtail in place, small air leak noted  CV: appears well perfused  Abdomen: soft, nontender, nondistended, no rebound or guarding, +/+ ostomy, Brayden in place with s/s drainage  Extremities: Grossly symmetric

## 2019-12-18 NOTE — PROGRESS NOTE ADULT - SUBJECTIVE AND OBJECTIVE BOX
[facility] NUTRITION SUPPORT / TPN -- FOLLOW UP NOTE  --------------------------------------------------------------------------------    24 hour events/subjective:  74y Male with a past medical history of COPD and EtOH dependence who presented 12/6 with abdominal pain, nausea, hematemesis, and poor PO intake for ~2-3 days. Labs significant for an CHI w/ Cr 2.74 and lactate of 9.4. He was also notably tachycardic to the 110s and hypotensive w/ SBP in the 70s. He was given 2 units of PRBCs and 2 L of LR in the ED due to concern for upper GI bleeding. Imaging revealed high grade SBO in the RLQ. Patient was taken to the OR emergently for an exploratory laparotomy, lysis of adhesions, decompression of bowel via enterotomy w/ primary repair, and Abthera VAC placement. Of note, the distal 50% of the bowel appeared dusky but viable. He required vasopressor support with phenylephrine and vasopressin infusions. He received 3000 mL of crystalloid w/ EBL of 10 mL and UOP of 25 mL. Patient was left intubated at the end of the case so SICU was consulted for hemodynamic monitoring. Taken back to the OR on 12/8 and underwent take down of Abthera, washout and re-application of the Abthera vac. Went back to OR on 12/10 night for colectomy with end ileostomy/ mucous fistula.    Pt w/ Lt PTX - 12/15 - pigtail cath placed- pt doing well on RA      continues to have air leak       no c/o sob/ palp/ dyspnea/ cough/ cp      Trial of CT to suction, repeat XRay in 4 hours  start antimotility  Tolerating diet  no n/v   no abdominal pain  High ostomy output improving down to 2.4L with more formed stool  monitor HR, metop adjustment as needed  cont antibiotics to complete course        Diet:  Diet, Regular:   Supplement Feeding Modality:  Oral  Ensure Clear Cans or Servings Per Day:  1       Frequency:  Three Times a day (12-16-19 @ 16:14)      Appetite: [  ]Poor [ x ]Adequate [  ]Good  Caloric intake:  [ x  ]  Adequate   [   ] Inadequate    ROS: General/ GI see HPI  all other systems negative      ALLERGIES & MEDICATIONS  --------------------------------------------------------------------------------  No Known Allergies      STANDING INPATIENT MEDICATIONS    albuterol/ipratropium for Nebulization 3 milliLiter(s) Nebulizer every 6 hours  buDESOnide    Inhalation Suspension 0.5 milliGRAM(s) Inhalation every 12 hours  chlorhexidine 2% Cloths 1 Application(s) Topical <User Schedule>  chlorhexidine 4% Liquid 1 Application(s) Topical <User Schedule>  diphenoxylate/atropine 1 Tablet(s) Oral every 6 hours  enoxaparin Injectable 40 milliGRAM(s) SubCutaneous daily  fat emulsion (Fish Oil and Plant Based) 20% Infusion 10.3 mL/Hr IV Continuous <Continuous>  insulin lispro (HumaLOG) corrective regimen sliding scale   SubCutaneous every 6 hours  metoprolol tartrate 12.5 milliGRAM(s) Oral every 6 hours  multiple electrolytes Injection Type 1 1000 milliLiter(s) IV Continuous <Continuous>  Parenteral Nutrition - Adult 1 Each TPN Continuous <Continuous>  Parenteral Nutrition - Adult 1 Each TPN Continuous <Continuous>      PRN INPATIENT MEDICATION  ondansetron Injectable 4 milliGRAM(s) IV Push every 6 hours PRN  sodium chloride 0.9% lock flush 10 milliLiter(s) IV Push every 1 hour PRN        VITALS/PHYSICAL EXAM  --------------------------------------------------------------------------------  T(C): 37.2 (12-18-19 @ 15:00), Max: 37.2 (12-17-19 @ 23:00)  HR: 105 (12-18-19 @ 17:06) (94 - 116)  BP: 96/59 (12-18-19 @ 17:00) (85/54 - 113/58)  RR: 38 (12-18-19 @ 17:00) (22 - 39)  SpO2: 100% (12-18-19 @ 17:06) (93% - 100%)  Wt(kg): --        12-17-19 @ 07:01  -  12-18-19 @ 07:00  --------------------------------------------------------  IN: 3661.2 mL / OUT: 3375 mL / NET: 286.2 mL    12-18-19 @ 07:01  -  12-18-19 @ 17:28  --------------------------------------------------------  IN: 1310.3 mL / OUT: 1670 mL / NET: -359.7 mL    PHYSICAL EXAM  --------------------------------------------------------------------------------  	Gen: guarded but stable, A&Ox3, NC O2   	HEENT: NC/AT, PERRL, supple neck, clear oropharynx, mucosa dry  	Pulm: decreased BS B/L, Lt sided pigtail  	GI: (+) BS, softly distended, non tender,                    midline incision w/staples c/d/i w/o s/sx infection                   (+)ostomy pink viable- thick bilious liquid stool like material                   LLQ JOVANNA w/ serosanguinous drainage              MSK: FROM, no contractures nor deformities  	Vascular: Equally Warm, no clubbing, cyanosis, nor edema                        Rt PICC dressing c/d/I   	Neuro: No focal deficits, intact sensation, weakened strength  	Psych: Normal affect and mood  	Skin: Warm, without rashes, good turgor        LABS/ CULTURES/ RADIOLOGY:              11.1   19.29 >-----------<  429      [12-18-19 @ 00:24]              32.9     128  |  84  |  59  ----------------------------<  139      [12-18-19 @ 12:10]  4.3   |  34  |  0.84        Ca     8.1     [12-18-19 @ 12:10]      Mg     2.5     [12-18-19 @ 12:10]      Phos  5.2     [12-18-19 @ 12:10]    TPro  6.1  /  Alb  2.8  /  TBili  0.9  /  DBili  0.4  /  AST  21  /  ALT  23  /  AlkPhos  86  [12-18-19 @ 00:24]    CAPILLARY BLOOD GLUCOSE  POCT Blood Glucose.: 147 mg/dL (18 Dec 2019 06:01)  POCT Blood Glucose.: 137 mg/dL (17 Dec 2019 22:15)    Prealbumin, Serum: 5 mg/dL (12-13-19 @ 07:46)    Triglycerides, Serum: 131 mg/dL (12.17.19 @ 00:47)  Triglycerides, Serum: 131 mg/dL (12.17.19 @ 00:47)  Triglycerides, Serum: 107 mg/dL (12.16.19 @ 00:35)  Triglycerides, Serum: 122 mg/dL (12.15.19 @ 00:26)  Triglycerides, Serum: 93 mg/dL (12.13.19 @ 07:45)

## 2019-12-18 NOTE — PROGRESS NOTE ADULT - ASSESSMENT
A/P: 74 year old male w/ PMH of COPD and EtOH dependence with PSH/o appy, prostate surgery, & spine surgery  septic shock and high grade SBO s/p exploratory laparotomy, lysis of adhesions, decompression of bowel via enterotomy w/ primary repair, and Abthera VAC placement on 12/6; s/p take down of Abthera, washout and re-application of the Abthera vac on 12/8. Now s/p SBR and end ileostomy on 12/10.   TPN consulted to assist w/ management of pt's nutrition in pt w/ prolonged hospital course now tolerating diet but has high Ileostomy output       TPN at full strength to decrease by 1/2 as pt tolerating diet and has decreased ostomy output  pt w/ severe Protein-Calorie Malnutrition-      TPN GOAL:  120 grams amino acids (800ml 15% a.a.)--> 60g AA today  210 grams dextrose (300ml 70% dex)--> 105g Dextrose today  50 grams SMOFlipid (250ml 20%IVFE @ 20.8ml/hr x 12 hours)--> 25g Lipids today  in 1200mL volume  Micronutrients: 10ml MVI, 3ml MTE-5      PICC w/dedicated port for only TPN - maintenance as per protocol  Strict Intake and Output- high ileostomy output -     will continue to monitor- prior to initiating medications to decrease diarrhea/ increase fluid reabsorption     diet advanced - and output slowing  down     TPN to help manage fluid losses while providing needed nutrition in this malnourished pt  Weights three times a week  Monitor BMP, Mg, Ionized Ca, Phosphorus daily  Continue to monitor Triglycerides weekly and Pre-albumin weekly  Hyperglycemia - stable with 10U - decreased dextrose so cut Insulin to 5U  for elevated FS      HgA1c noted      Fingersticks to monitor glucose every 6 hours until stable, may be decreased to twice a day      ISS coverage - to be ordered by primary team  HypoK improving   Continue as per SICU / Surgery, will follow with you, D/w primary team    Andreina Hubbard PA-C  TPN team, pager [phone]  D/w Ana M Siegel

## 2019-12-18 NOTE — PROGRESS NOTE ADULT - SUBJECTIVE AND OBJECTIVE BOX
HISTORY  74y Male past medical history of COPD and EtOH dependence who presented 12/6 with abdominal pain, nausea, hematemesis, and poor PO intake for ~2-3 days. Labs significant for an CHI w/ Cr 2.74 and lactate of 9.4. He was also notably tachycardic to the 110s and hypotensive w/ SBP in the 70s. He was given 2 units of PRBCs and 2 L of LR in the ED due to concern for upper GI bleeding. Imaging revealed high grade SBO in the RLQ. Patient was taken to the OR emergently for an exploratory laparotomy, lysis of adhesions, decompression of bowel via enterotomy w/ primary repair, and Abthera VAC placement. Of note, the distal 50% of the bowel appeared dusky but viable. He required vasopressor support with phenylephrine and vasopressin infusions. He received 3000 mL of crystalloid w/ EBL of 10 mL and UOP of 25 mL. Patient was left intubated at the end of the case so SICU was consulted for hemodynamic monitoring. Taken back to the OR on 12/8 and underwent take down of Abthera, washout and re-application of the Abthera vac. Went back to OR on 12/10 night for colectomy.        24 HOUR EVENTS:  - Received 500ml bolus of plasma-lyte in the evening to replete high ostomy output. Plasma-lyte @ 30ml/hr started as well.   - Lopressor adjusted to 12.5 q6 hrs     SUBJECTIVE/ROS:  [ ] A ten-point review of systems was otherwise negative except as noted.  [ ] Due to altered mental status/intubation, subjective information were not able to be obtained from the patient. History was obtained, to the extent possible, from review of the chart and collateral sources of information.      NEURO  Exam: awake, alert, oriented  Meds: ondansetron Injectable 4 milliGRAM(s) IV Push every 6 hours PRN Nausea and/or Vomiting    [x] Adequacy of sedation and pain control has been assessed and adjusted      RESPIRATORY  RR: 24 (12-18-19 @ 01:00) (22 - 42)  SpO2: 100% (12-18-19 @ 01:00) (93% - 100%)  Exam: unlabored, clear to auscultation bilaterally  Mechanical Ventilation: none  [N/A] Extubation Readiness Assessed  Meds: albuterol/ipratropium for Nebulization 3 milliLiter(s) Nebulizer every 6 hours  buDESOnide    Inhalation Suspension 0.5 milliGRAM(s) Inhalation every 12 hours        CARDIOVASCULAR  HR: 99 (12-18-19 @ 01:00) (86 - 110)  BP: 91/55 (12-18-19 @ 01:00) (86/51 - 112/63)  BP(mean): 68 (12-18-19 @ 01:00) (64 - 81)  Exam: regular rate and rhythm  Cardiac Rhythm: sinus  Perfusion     [x]Adequate   [ ]Inadequate  Mentation   [x]Normal       [ ]Reduced  Extremities  [x]Warm         [ ]Cool  Volume Status [ ]Hypervolemic [x]Euvolemic [ ]Hypovolemic  Meds: metoprolol tartrate 12.5 milliGRAM(s) Oral every 6 hours        GI/NUTRITION  Exam: soft, nontender, nondistended  Diet: Regular diet   Meds: none    GENITOURINARY  I&O's Detail    12-16 @ 07:01  -  12-17 @ 07:00  --------------------------------------------------------  IN:    amiodarone Infusion: 183.7 mL    amiodarone Infusion: 83.5 mL    fat emulsion (Fish Oil and Plant Based) 20% Infusion: 265.2 mL    Oral Fluid: 480 mL    TPN (Total Parenteral Nutrition): 2400 mL  Total IN: 3412.4 mL    OUT:    Bulb: 75 mL    Chest Tube: 65 mL    Ileostomy: 4950 mL    Incontinent per Condom Catheter: 1600 mL  Total OUT: 6690 mL    Total NET: -3277.6 mL      12-17 @ 07:01  -  12-18 @ 01:06  --------------------------------------------------------  IN:    fat emulsion (Fish Oil and Plant Based) 20% Infusion: 187.2 mL    multiple electrolytes Injection Type 1: 90 mL    multiple electrolytes Injection Type 1 Bolus: 500 mL    TPN (Total Parenteral Nutrition): 1800 mL  Total IN: 2577.2 mL    OUT:    Bulb: 40 mL    Chest Tube: 10 mL    Ileostomy: 1500 mL    Incontinent per Condom Catheter: 500 mL  Total OUT: 2050 mL    Total NET: 527.2 mL          12-18    132<L>  |  87<L>  |  52<H>  ----------------------------<  134<H>  3.6   |  34<H>  |  0.67    Ca    8.2<L>      18 Dec 2019 00:24  Phos  4.7     12-18  Mg     2.4     12-18    TPro  6.1  /  Alb  2.8<L>  /  TBili  0.9  /  DBili  0.4<H>  /  AST  21  /  ALT  23  /  AlkPhos  86  12-18    [ ] Martinez catheter, indication: N/A  Meds: fat emulsion (Fish Oil and Plant Based) 20% Infusion 20.8 mL/Hr IV Continuous <Continuous>  multiple electrolytes Injection Type 1 1000 milliLiter(s) IV Continuous <Continuous>  Parenteral Nutrition - Adult 1 Each TPN Continuous <Continuous>  sodium chloride 0.9% lock flush 10 milliLiter(s) IV Push every 1 hour PRN Pre/post blood products, medications, blood draw, and to maintain line patency        HEMATOLOGIC  Meds: enoxaparin Injectable 40 milliGRAM(s) SubCutaneous daily    [x] VTE Prophylaxis                        11.1   19.29 )-----------( 429      ( 18 Dec 2019 00:24 )             32.9       Transfusion     [ ] PRBC   [ ] Platelets   [ ] FFP   [ ] Cryoprecipitate      INFECTIOUS DISEASES  WBC Count: 19.29 K/uL (12-18 @ 00:24)    RECENT CULTURES: none    Meds: none      ENDOCRINE  CAPILLARY BLOOD GLUCOSE      POCT Blood Glucose.: 137 mg/dL (17 Dec 2019 22:15)  POCT Blood Glucose.: 120 mg/dL (17 Dec 2019 17:16)  POCT Blood Glucose.: 113 mg/dL (17 Dec 2019 12:33)  POCT Blood Glucose.: 113 mg/dL (17 Dec 2019 05:20)  POCT Blood Glucose.: 122 mg/dL (17 Dec 2019 02:40)    Meds: insulin lispro (HumaLOG) corrective regimen sliding scale   SubCutaneous every 6 hours        ACCESS DEVICES:  [x] Peripheral IV  [ ] Central Venous Line	[ ] R	[ ] L	[ ] IJ	[ ] Fem	[ ] SC	Placed:   [ ] Arterial Line		[ ] R	[ ] L	[ ] Fem	[ ] Rad	[ ] Ax	Placed:   [ ] PICC:					[ ] Mediport  [ ] Urinary Catheter, Date Placed:   [x] Necessity of urinary, arterial, and venous catheters discussed    OTHER MEDICATIONS:  chlorhexidine 2% Cloths 1 Application(s) Topical <User Schedule>  chlorhexidine 4% Liquid 1 Application(s) Topical <User Schedule>      CODE STATUS: full code       IMAGING: < from: CT Abdomen and Pelvis No Cont (12.06.19 @ 16:48) >  FINDINGS:    LOWER CHEST: Within normal limits.    LIVER: Within normal limits.  BILE DUCTS: Normal caliber.  GALLBLADDER: Within normal limits.  SPLEEN: Within normal limits.  PANCREAS: Within normal limits.  ADRENALS: Within normal limits.  KIDNEYS/URETERS: No hydronephrosis. Nonobstructing bilateral renal   calculi.    BLADDER: Within normal limits.  REPRODUCTIVE ORGANS: Prostatectomy.    BOWEL: High-grade small bowel obstruction with transition point in the   right lower abdomen (series 602, image 34).  PERITONEUM: No ascites.  VESSELS: Atherosclerotic changes.  RETROPERITONEUM/LYMPH NODES: No lymphadenopathy.  ABDOMINAL WALL: Within normal limits.  BONES: Degenerative changes.    IMPRESSION:     High-grade small bowel obstruction with transition point in the right   lower quadrant.          < end of copied text >

## 2019-12-18 NOTE — PROGRESS NOTE ADULT - ASSESSMENT
74M s/p ex lap, MIRYAM, closure of enterotomy, abthera vac placement 12/8 early morning. RTOR for exlap/washout 12/9. s/p RTOR ex lap, resection of 150cm bowel, creation of colostomy. Course c/b spontaneous PTX s/p pigtail 12/15, pleuravac with known air leak.     Plan:    - Diet: LRD  - Please start imodium for increased ileostomy output  - Pain medication: IV tylenol  - Monitor HR  - Pigtail to suction  - DVT ppx: Lovenox  - Activity: OOB  - supportive care per SICU  - TPN per nutrition team    Red Team Surgery  [id number]

## 2019-12-19 LAB
ALBUMIN SERPL ELPH-MCNC: 2.8 G/DL — LOW (ref 3.3–5)
ALP SERPL-CCNC: 102 U/L — SIGNIFICANT CHANGE UP (ref 40–120)
ALT FLD-CCNC: 30 U/L — SIGNIFICANT CHANGE UP (ref 10–45)
ANION GAP SERPL CALC-SCNC: 12 MMOL/L — SIGNIFICANT CHANGE UP (ref 5–17)
ANION GAP SERPL CALC-SCNC: 13 MMOL/L — SIGNIFICANT CHANGE UP (ref 5–17)
AST SERPL-CCNC: 28 U/L — SIGNIFICANT CHANGE UP (ref 10–40)
BILIRUB DIRECT SERPL-MCNC: 0.9 MG/DL — HIGH (ref 0–0.2)
BILIRUB INDIRECT FLD-MCNC: 0.5 MG/DL — SIGNIFICANT CHANGE UP (ref 0.2–1)
BILIRUB SERPL-MCNC: 1.4 MG/DL — HIGH (ref 0.2–1.2)
BUN SERPL-MCNC: 65 MG/DL — HIGH (ref 7–23)
BUN SERPL-MCNC: 69 MG/DL — HIGH (ref 7–23)
CALCIUM SERPL-MCNC: 8.4 MG/DL — SIGNIFICANT CHANGE UP (ref 8.4–10.5)
CALCIUM SERPL-MCNC: 8.6 MG/DL — SIGNIFICANT CHANGE UP (ref 8.4–10.5)
CHLORIDE SERPL-SCNC: 83 MMOL/L — LOW (ref 96–108)
CHLORIDE SERPL-SCNC: 85 MMOL/L — LOW (ref 96–108)
CO2 SERPL-SCNC: 37 MMOL/L — HIGH (ref 22–31)
CO2 SERPL-SCNC: 37 MMOL/L — HIGH (ref 22–31)
CREAT SERPL-MCNC: 0.91 MG/DL — SIGNIFICANT CHANGE UP (ref 0.5–1.3)
CREAT SERPL-MCNC: 0.93 MG/DL — SIGNIFICANT CHANGE UP (ref 0.5–1.3)
GLUCOSE BLDC GLUCOMTR-MCNC: 106 MG/DL — HIGH (ref 70–99)
GLUCOSE BLDC GLUCOMTR-MCNC: 121 MG/DL — HIGH (ref 70–99)
GLUCOSE BLDC GLUCOMTR-MCNC: 139 MG/DL — HIGH (ref 70–99)
GLUCOSE BLDC GLUCOMTR-MCNC: 139 MG/DL — HIGH (ref 70–99)
GLUCOSE SERPL-MCNC: 117 MG/DL — HIGH (ref 70–99)
GLUCOSE SERPL-MCNC: 150 MG/DL — HIGH (ref 70–99)
HCT VFR BLD CALC: 33.9 % — LOW (ref 39–50)
HGB BLD-MCNC: 11 G/DL — LOW (ref 13–17)
MAGNESIUM SERPL-MCNC: 2.7 MG/DL — HIGH (ref 1.6–2.6)
MAGNESIUM SERPL-MCNC: 2.9 MG/DL — HIGH (ref 1.6–2.6)
MCHC RBC-ENTMCNC: 29.5 PG — SIGNIFICANT CHANGE UP (ref 27–34)
MCHC RBC-ENTMCNC: 32.4 GM/DL — SIGNIFICANT CHANGE UP (ref 32–36)
MCV RBC AUTO: 90.9 FL — SIGNIFICANT CHANGE UP (ref 80–100)
NRBC # BLD: 0 /100 WBCS — SIGNIFICANT CHANGE UP (ref 0–0)
PHOSPHATE SERPL-MCNC: 4.3 MG/DL — SIGNIFICANT CHANGE UP (ref 2.5–4.5)
PHOSPHATE SERPL-MCNC: 4.4 MG/DL — SIGNIFICANT CHANGE UP (ref 2.5–4.5)
PLATELET # BLD AUTO: 479 K/UL — HIGH (ref 150–400)
POTASSIUM SERPL-MCNC: 4.1 MMOL/L — SIGNIFICANT CHANGE UP (ref 3.5–5.3)
POTASSIUM SERPL-MCNC: 4.1 MMOL/L — SIGNIFICANT CHANGE UP (ref 3.5–5.3)
POTASSIUM SERPL-SCNC: 4.1 MMOL/L — SIGNIFICANT CHANGE UP (ref 3.5–5.3)
POTASSIUM SERPL-SCNC: 4.1 MMOL/L — SIGNIFICANT CHANGE UP (ref 3.5–5.3)
PROT SERPL-MCNC: 6.1 G/DL — SIGNIFICANT CHANGE UP (ref 6–8.3)
RBC # BLD: 3.73 M/UL — LOW (ref 4.2–5.8)
RBC # FLD: 13.6 % — SIGNIFICANT CHANGE UP (ref 10.3–14.5)
SODIUM SERPL-SCNC: 132 MMOL/L — LOW (ref 135–145)
SODIUM SERPL-SCNC: 135 MMOL/L — SIGNIFICANT CHANGE UP (ref 135–145)
WBC # BLD: 17.32 K/UL — HIGH (ref 3.8–10.5)
WBC # FLD AUTO: 17.32 K/UL — HIGH (ref 3.8–10.5)

## 2019-12-19 PROCEDURE — 99233 SBSQ HOSP IP/OBS HIGH 50: CPT | Mod: GC

## 2019-12-19 PROCEDURE — 99232 SBSQ HOSP IP/OBS MODERATE 35: CPT

## 2019-12-19 RX ORDER — LOPERAMIDE HCL 2 MG
4 TABLET ORAL EVERY 6 HOURS
Refills: 0 | Status: DISCONTINUED | OUTPATIENT
Start: 2019-12-19 | End: 2019-12-21

## 2019-12-19 RX ORDER — ELECTROLYTE SOLUTION,INJ
1 VIAL (ML) INTRAVENOUS
Refills: 0 | Status: DISCONTINUED | OUTPATIENT
Start: 2019-12-19 | End: 2019-12-19

## 2019-12-19 RX ORDER — I.V. FAT EMULSION 20 G/100ML
10.3 EMULSION INTRAVENOUS
Qty: 25 | Refills: 0 | Status: DISCONTINUED | OUTPATIENT
Start: 2019-12-19 | End: 2019-12-20

## 2019-12-19 RX ORDER — SODIUM CHLORIDE 9 MG/ML
1000 INJECTION INTRAMUSCULAR; INTRAVENOUS; SUBCUTANEOUS
Refills: 0 | Status: DISCONTINUED | OUTPATIENT
Start: 2019-12-19 | End: 2019-12-19

## 2019-12-19 RX ORDER — METOPROLOL TARTRATE 50 MG
12.5 TABLET ORAL EVERY 6 HOURS
Refills: 0 | Status: DISCONTINUED | OUTPATIENT
Start: 2019-12-19 | End: 2019-12-29

## 2019-12-19 RX ORDER — SODIUM CHLORIDE 9 MG/ML
500 INJECTION INTRAMUSCULAR; INTRAVENOUS; SUBCUTANEOUS ONCE
Refills: 0 | Status: COMPLETED | OUTPATIENT
Start: 2019-12-19 | End: 2019-12-19

## 2019-12-19 RX ORDER — DIPHENOXYLATE HCL/ATROPINE 2.5-.025MG
2 TABLET ORAL EVERY 6 HOURS
Refills: 0 | Status: DISCONTINUED | OUTPATIENT
Start: 2019-12-19 | End: 2019-12-21

## 2019-12-19 RX ORDER — SODIUM CHLORIDE 9 MG/ML
1000 INJECTION INTRAMUSCULAR; INTRAVENOUS; SUBCUTANEOUS
Refills: 0 | Status: DISCONTINUED | OUTPATIENT
Start: 2019-12-19 | End: 2019-12-23

## 2019-12-19 RX ADMIN — Medication 3 MILLILITER(S): at 00:27

## 2019-12-19 RX ADMIN — Medication 0.5 MILLIGRAM(S): at 17:48

## 2019-12-19 RX ADMIN — Medication 2 TABLET(S): at 17:12

## 2019-12-19 RX ADMIN — Medication 1 EACH: at 17:41

## 2019-12-19 RX ADMIN — Medication 1 TABLET(S): at 06:37

## 2019-12-19 RX ADMIN — Medication 3 MILLILITER(S): at 17:47

## 2019-12-19 RX ADMIN — Medication 4 MILLIGRAM(S): at 17:13

## 2019-12-19 RX ADMIN — Medication 3 MILLILITER(S): at 11:47

## 2019-12-19 RX ADMIN — SODIUM CHLORIDE 3000 MILLILITER(S): 9 INJECTION INTRAMUSCULAR; INTRAVENOUS; SUBCUTANEOUS at 10:11

## 2019-12-19 RX ADMIN — Medication 0.5 MILLIGRAM(S): at 05:33

## 2019-12-19 RX ADMIN — CHLORHEXIDINE GLUCONATE 1 APPLICATION(S): 213 SOLUTION TOPICAL at 06:37

## 2019-12-19 RX ADMIN — CHLORHEXIDINE GLUCONATE 1 APPLICATION(S): 213 SOLUTION TOPICAL at 06:38

## 2019-12-19 RX ADMIN — Medication 4 MILLIGRAM(S): at 12:02

## 2019-12-19 RX ADMIN — Medication 3 MILLILITER(S): at 05:33

## 2019-12-19 RX ADMIN — I.V. FAT EMULSION 10.3 ML/HR: 20 EMULSION INTRAVENOUS at 22:52

## 2019-12-19 RX ADMIN — Medication 2 TABLET(S): at 23:00

## 2019-12-19 RX ADMIN — Medication 4 MILLIGRAM(S): at 23:00

## 2019-12-19 RX ADMIN — ENOXAPARIN SODIUM 40 MILLIGRAM(S): 100 INJECTION SUBCUTANEOUS at 11:15

## 2019-12-19 RX ADMIN — Medication 1 EACH: at 22:52

## 2019-12-19 RX ADMIN — Medication 2 TABLET(S): at 11:15

## 2019-12-19 RX ADMIN — I.V. FAT EMULSION 10.3 ML/HR: 20 EMULSION INTRAVENOUS at 17:41

## 2019-12-19 NOTE — PROGRESS NOTE ADULT - SUBJECTIVE AND OBJECTIVE BOX
RED SURGERY DAILY PROGRESS NOTE:       SUBJECTIVE/ROS: Patient seen and examined at bedside.  Patient denies any pain at this time.  He is tolerating PO, still with increased ostomy output.       24 HR EVENTS:  - Chest pigtail placed to water seal  - Lomotil 1 mg q6h initiated      MEDICATIONS  (STANDING):  albuterol/ipratropium for Nebulization 3 milliLiter(s) Nebulizer every 6 hours  buDESOnide    Inhalation Suspension 0.5 milliGRAM(s) Inhalation every 12 hours  chlorhexidine 2% Cloths 1 Application(s) Topical <User Schedule>  diphenoxylate/atropine 2 Tablet(s) Oral every 6 hours  enoxaparin Injectable 40 milliGRAM(s) SubCutaneous daily  fat emulsion (Fish Oil and Plant Based) 20% Infusion 10.3 mL/Hr (10.3 mL/Hr) IV Continuous <Continuous>  insulin lispro (HumaLOG) corrective regimen sliding scale   SubCutaneous every 6 hours  loperamide 4 milliGRAM(s) Oral every 6 hours  metoprolol tartrate 12.5 milliGRAM(s) Oral every 6 hours  Parenteral Nutrition - Adult 1 Each (50 mL/Hr) TPN Continuous <Continuous>  Parenteral Nutrition - Adult 1 Each (50 mL/Hr) TPN Continuous <Continuous>  sodium chloride 0.9%. 1000 milliLiter(s) (10 mL/Hr) IV Continuous <Continuous>    MEDICATIONS  (PRN):  ondansetron Injectable 4 milliGRAM(s) IV Push every 6 hours PRN Nausea and/or Vomiting      OBJECTIVE:    Vital Signs Last 24 Hrs  T(C): 36.6 (19 Dec 2019 11:00), Max: 37.6 (18 Dec 2019 23:00)  T(F): 97.9 (19 Dec 2019 11:00), Max: 99.7 (18 Dec 2019 23:00)  HR: 95 (19 Dec 2019 12:00) (89 - 116)  BP: 88/53 (19 Dec 2019 12:00) (84/52 - 122/63)  BP(mean): 64 (19 Dec 2019 12:00) (60 - 86)  RR: 29 (19 Dec 2019 12:00) (20 - 74)  SpO2: 98% (19 Dec 2019 12:00) (92% - 100%)        I&O's Detail    18 Dec 2019 07:01  -  19 Dec 2019 07:00  --------------------------------------------------------  IN:    fat emulsion (Fish Oil and Plant Based) 20% Infusion: 133.9 mL    multiple electrolytes Injection Type 1multiple electrolytes Injection Type 1: 330 mL    sodium chloride 0.9%: 1500 mL    TPN (Total Parenteral Nutrition): 1800 mL  Total IN: 3763.9 mL    OUT:    Chest Tube: 20 mL    Ileostomy: 3750 mL    Incontinent per Condom Catheter: 1050 mL  Total OUT: 4820 mL    Total NET: -1056.1 mL      19 Dec 2019 07:01  -  19 Dec 2019 12:15  --------------------------------------------------------  IN:    TPN (Total Parenteral Nutrition): 200 mL  Total IN: 200 mL    OUT:  Total OUT: 0 mL    Total NET: 200 mL          Daily     Daily Weight in k.7 (19 Dec 2019 01:00)    LABS:                        11.0   17.32 )-----------( 479      ( 19 Dec 2019 00:34 )             33.9         135  |  85<L>  |  69<H>  ----------------------------<  150<H>  4.1   |  37<H>  |  0.93    Ca    8.6      19 Dec 2019 11:39  Phos  4.4       Mg     2.9         TPro  6.1  /  Alb  2.8<L>  /  TBili  1.4<H>  /  DBili  0.9<H>  /  AST  28  /  ALT  30  /  AlkPhos  102                    PHYSICAL EXAM:  General: Appears well, NAD  CHEST: breathing comfortably, pigtail in place, small air leak noted  CV: appears well perfused  Abdomen: soft, nontender, nondistended, no rebound or guarding, +/+ ostomy, Brayden in place with s/s drainage  Extremities: Grossly symmetric

## 2019-12-19 NOTE — PROGRESS NOTE ADULT - ASSESSMENT
74M s/p ex lap, MIRYAM, closure of enterotomy, abthera vac placement 12/8 early morning. RTOR for exlap/washout 12/9. s/p RTOR ex lap, resection of 150cm bowel, creation of colostomy. Course c/b spontaneous PTX s/p pigtail 12/15, pleuravac with known air leak.     Plan:  - Diet: LRD  - imodium for increased ileostomy output  - Pain medication: IV tylenol  - Monitor HR  - Pigtail to water seal  - DVT ppx: Lovenox  - Activity: OOB  - supportive care per SICU  - TPN per nutrition team    Red Team Surgery  [id number]

## 2019-12-19 NOTE — PROGRESS NOTE ADULT - SUBJECTIVE AND OBJECTIVE BOX
SICU DAILY PROGRESS NOTE    74y Male past medical history of COPD and EtOH dependence who presented 12/6 with abdominal pain, nausea, hematemesis, and poor PO intake for ~2-3 days. Labs significant for an CHI w/ Cr 2.74 and lactate of 9.4. He was also notably tachycardic to the 110s and hypotensive w/ SBP in the 70s. He was given 2 units of PRBCs and 2 L of LR in the ED due to concern for upper GI bleeding. Imaging revealed high grade SBO in the RLQ. Patient was taken to the OR emergently for an exploratory laparotomy, lysis of adhesions, decompression of bowel via enterotomy w/ primary repair, and Abthera VAC placement. Of note, the distal 50% of the bowel appeared dusky but viable. He required vasopressor support with phenylephrine and vasopressin infusions. He received 3000 mL of crystalloid w/ EBL of 10 mL and UOP of 25 mL. Patient was left intubated at the end of the case so SICU was consulted for hemodynamic monitoring. Taken back to the OR on 12/8 and underwent take down of Abthera, washout and re-application of the Abthera vac. Went back to OR on 12/10 night for colectomy.    24 HOUR EVENTS:  - Chest pigtail placed to water seal  - Lomotil 1 mg q6h initiated    SUBJECTIVE/ROS:  [ ] A ten-point review of systems was otherwise negative except as noted.  [ ] Due to altered mental status/intubation, subjective information were not able to be obtained from the patient. History was obtained, to the extent possible, from review of the chart and collateral sources of information.      NEURO  RASS:     GCS:     CAM ICU:  Exam: awake, alert, oriented  Meds: ondansetron Injectable 4 milliGRAM(s) IV Push every 6 hours PRN Nausea and/or Vomiting    [x] Adequacy of sedation and pain control has been assessed and adjusted      RESPIRATORY  RR: 25 (12-19-19 @ 03:00) (20 - 74)  SpO2: 97% (12-19-19 @ 03:00) (92% - 100%)  Wt(kg): --  Exam: unlabored, clear to auscultation bilaterally  Mechanical Ventilation:     [N/A] Extubation Readiness Assessed  Meds: albuterol/ipratropium for Nebulization 3 milliLiter(s) Nebulizer every 6 hours  buDESOnide    Inhalation Suspension 0.5 milliGRAM(s) Inhalation every 12 hours        CARDIOVASCULAR  HR: 94 (12-19-19 @ 03:00) (92 - 116)  BP: 110/59 (12-19-19 @ 03:00) (85/54 - 122/63)  BP(mean): 80 (12-19-19 @ 03:00) (64 - 86)  ABP: --  ABP(mean): --  Wt(kg): --  CVP(cm H2O): --      Exam: regular rate and rhythm  Cardiac Rhythm: sinus  Perfusion     [x]Adequate   [ ]Inadequate  Mentation   [x]Normal       [ ]Reduced  Extremities  [x]Warm         [ ]Cool  Volume Status [ ]Hypervolemic [x]Euvolemic [ ]Hypovolemic  Meds: metoprolol tartrate 12.5 milliGRAM(s) Oral every 6 hours        GI/NUTRITION  Exam: soft, nontender, nondistended, ileostomy viable with output  Diet: Regular w/ Ensure  Meds: diphenoxylate/atropine 1 Tablet(s) Oral every 6 hours      GENITOURINARY  I&O's Detail    12-17 @ 07:01  -  12-18 @ 07:00  --------------------------------------------------------  IN:    fat emulsion (Fish Oil and Plant Based) 20% Infusion: 291.2 mL    IV PiggyBack: 200 mL    multiple electrolytes Injection Type 1 Bolus: 500 mL    multiple electrolytes Injection Type 1multiple electrolytes Injection Type 1: 270 mL    TPN (Total Parenteral Nutrition): 2400 mL  Total IN: 3661.2 mL    OUT:    Bulb: 65 mL    Chest Tube: 10 mL    Ileostomy: 2400 mL    Incontinent per Condom Catheter: 900 mL  Total OUT: 3375 mL    Total NET: 286.2 mL      12-18 @ 07:01  -  12-19 @ 03:52  --------------------------------------------------------  IN:    fat emulsion (Fish Oil and Plant Based) 20% Infusion: 103 mL    multiple electrolytes Injection Type 1multiple electrolytes Injection Type 1: 330 mL    sodium chloride 0.9%.: 900 mL    TPN (Total Parenteral Nutrition): 1600 mL  Total IN: 2933 mL    OUT:    Chest Tube: 20 mL    Ileostomy: 2550 mL    Incontinent per Condom Catheter: 300 mL  Total OUT: 2870 mL    Total NET: 63 mL          12-19    132<L>  |  83<L>  |  65<H>  ----------------------------<  117<H>  4.1   |  37<H>  |  0.91    Ca    8.4      19 Dec 2019 00:34  Phos  4.3     12-19  Mg     2.7     12-19    TPro  6.1  /  Alb  2.8<L>  /  TBili  1.4<H>  /  DBili  0.9<H>  /  AST  28  /  ALT  30  /  AlkPhos  102  12-19    [ ] Martinez catheter, indication: N/A  Meds: fat emulsion (Fish Oil and Plant Based) 20% Infusion 10.3 mL/Hr IV Continuous <Continuous>  Parenteral Nutrition - Adult 1 Each TPN Continuous <Continuous>  sodium chloride 0.9% lock flush 10 milliLiter(s) IV Push every 1 hour PRN Pre/post blood products, medications, blood draw, and to maintain line patency  sodium chloride 0.9%. 1000 milliLiter(s) IV Continuous <Continuous>        HEMATOLOGIC  Meds: enoxaparin Injectable 40 milliGRAM(s) SubCutaneous daily    [x] VTE Prophylaxis                        11.0   17.32 )-----------( 479      ( 19 Dec 2019 00:34 )             33.9       Transfusion     [ ] PRBC   [ ] Platelets   [ ] FFP   [ ] Cryoprecipitate      INFECTIOUS DISEASES  WBC Count: 17.32 K/uL (12-19 @ 00:34)    RECENT CULTURES:    Meds:       ENDOCRINE  CAPILLARY BLOOD GLUCOSE      POCT Blood Glucose.: 112 mg/dL (18 Dec 2019 22:06)  POCT Blood Glucose.: 132 mg/dL (18 Dec 2019 17:33)  POCT Blood Glucose.: 147 mg/dL (18 Dec 2019 06:01)    Meds: insulin lispro (HumaLOG) corrective regimen sliding scale   SubCutaneous every 6 hours        ACCESS DEVICES:  [ ] Peripheral IV  [ ] Central Venous Line	[ ] R	[ ] L	[ ] IJ	[ ] Fem	[ ] SC	Placed:   [ ] Arterial Line		[ ] R	[ ] L	[ ] Fem	[ ] Rad	[ ] Ax	Placed:   [ ] PICC:					[ ] Mediport  [ ] Urinary Catheter, Date Placed:   [x] Necessity of urinary, arterial, and venous catheters discussed    OTHER MEDICATIONS:  chlorhexidine 2% Cloths 1 Application(s) Topical <User Schedule>  chlorhexidine 4% Liquid 1 Application(s) Topical <User Schedule>      CODE STATUS:      IMAGING:

## 2019-12-19 NOTE — PROGRESS NOTE ADULT - ATTENDING COMMENTS
ON: had 3.7 liter ostomy output, repeated half to one  mid night CXR questionable increased PTX  Cr increased to 0.91  improved hyponatremia with NS replacement  hyperbilirubinemia likely in setting of TPN    - AM CXR  - increase lomotil, add imodium  - 500 cc NS for hyponatremia and element of mild CHI  - metop q6 with goal of sys   - cont half strength TPN  - replace 1 to 1 with NS  - cont VTE PPX  - cons ins sliding scale  - remains in SICU

## 2019-12-19 NOTE — PROGRESS NOTE ADULT - ASSESSMENT
75 y/o M presenting with septic shock and high grade SBO s/p exploratory laparotomy, lysis of adhesions, decompression of bowel via enterotomy w/ primary repair, and Abthera VAC placement on 12/6; s/p take down of Abthera, washout and re-application of the Abthera vac on 12/8. Now s/p SBR and end ileostomy on 12/10 acute respiratory distress now improving, Pneumothorax, hyperglycemia, delirium.    PLAN:    Neuro: A&Ox3   - off sedation and standing pain meds.     Resp: acute respiratory failure upon initial presentation, COPD at baseline, spontaneous left sided pneumothorax s/p pig-tail catheter placement.   - Continue to monitor on RA  - Continue Duonebs, budesonide  - Left sided pigtail to water seal, f/u AM CXR    CV: septic shock requiring vasopressor support, now hemodynamically stable   - metoprolol 12.5 q6 hrs     GI: SBO s/p exploratory laparotomy, lysis of adhesions, decompression of bowel via enterotomy w/ primary repair, and Abthera VAC placement and removal with 150 cm of small bowel removed and end ileostomy.   - Regular diet w/ Ensure and TPN (calorie count)  - Monitor ostomy output, 0.5 : 1 repletions, lomotil 1 mg q6h    Renal: CHI, resolved   - Replete electrolytes as needed  - Monitor I/Os    Heme: no acute issues  - Lovenox for VTE prophylaxis  - SCDs     ID: No active issues   - Trend WBC, monitor fever curve    Endo: no acute issues  - Started on sliding scale with initiation of TPN, q6 fingersticks

## 2019-12-19 NOTE — PROGRESS NOTE ADULT - ASSESSMENT
A/P: 74 year old male w/ PMH of COPD and EtOH dependence with PSH/o appy, prostate surgery, & spine surgery  septic shock and high grade SBO s/p exploratory laparotomy, lysis of adhesions, decompression of bowel via enterotomy w/ primary repair, and Abthera VAC placement on 12/6; s/p take down of Abthera, washout and re-application of the Abthera vac on 12/8. Now s/p SBR and end ileostomy on 12/10.   TPN consulted to assist w/ management of pt's nutrition in pt w/ prolonged hospital course now tolerating diet but has high Ileostomy output     TPN at full strength to decrease by 1/2 as pt tolerating diet and has decreased ostomy output  pt w/ severe Protein-Calorie Malnutrition-  This afternoon it was noted that pt was coughing when eating --> aspirating PO and made NPO      D/w team too late to change for tonight, but will return to full strength TPN tomorrow    TPN GOAL:  120 grams amino acids (800ml 15% a.a.)--> 60g AA today  210 grams dextrose (300ml 70% dex)--> 105g Dextrose today  50 grams SMOFlipid (250ml 20%IVFE @ 20.8ml/hr x 12 hours)--> 25g Lipids today  in 1200mL volume  Micronutrients: 10ml MVI, 3ml MTE-5    Possible Contraction (metabolic) Alkolosis - with HypoNa & ELevated Bicarb- continue to monitor &         replete as per SICU        Continue to monitor for CHI -BUN/ Crt increasing        HypoK improving with increased KCl in TPN  PICC w/dedicated port for only TPN - maintenance as per protocol  Strict Intake and Output- high ileostomy output - replete with NS as per SICU continue to monitor  Weights three times a week  Monitor BMP, Mg, Ionized Ca, Phosphorus daily  Continue to monitor Triglycerides weekly and Pre-albumin weekly  Hyperglycemia - stable with 5U  for elevated FS      Fingersticks to monitor glucose every 6 hours until stable, may be decreased to twice a day      ISS coverage - to be ordered by primary team  Continue as per SICU / Surgery, will follow with you, D/w primary team    Andreina Hubbard PA-C  TPN team, pager [phone]  D/w Ana M Siegel

## 2019-12-19 NOTE — PROGRESS NOTE ADULT - SUBJECTIVE AND OBJECTIVE BOX
[facility] NUTRITION SUPPORT / TPN -- FOLLOW UP NOTE  --------------------------------------------------------------------------------    24 hour events/subjective:  74y Male with a past medical history of COPD and EtOH dependence who presented 12/6 with abdominal pain, nausea, hematemesis, and poor PO intake for ~2-3 days. Labs significant for an CHI w/ Cr 2.74 and lactate of 9.4. He was also notably tachycardic to the 110s and hypotensive w/ SBP in the 70s. He was given 2 units of PRBCs and 2 L of LR in the ED due to concern for upper GI bleeding. Imaging revealed high grade SBO in the RLQ. Patient was taken to the OR emergently for an exploratory laparotomy, lysis of adhesions, decompression of bowel via enterotomy w/ primary repair, and Abthera VAC placement. Of note, the distal 50% of the bowel appeared dusky but viable. He required vasopressor support with phenylephrine and vasopressin infusions. He received 3000 mL of crystalloid w/ EBL of 10 mL and UOP of 25 mL. Patient was left intubated at the end of the case so SICU was consulted for hemodynamic monitoring. Taken back to the OR on 12/8 and underwent take down of Abthera, washout and re-application of the Abthera vac. Went back to OR on 12/10 night for colectomy with end ileostomy/ mucous fistula.    Pt w/ Lt PTX - 12/15 - pigtail cath placed- pt doing well on RA      continue mngt as per SICU  RN noted pt coughing while eating- pt aspirating   made NPO- for further work up  no n/v   no abdominal pain  High ostomy output increased to 3.7L - SICU to replace volume      at time of rounds pt was tolerating diet and imodium & lomotil were initiated hoping to decr output      now pt NPO and pt to return to Full strength TPN tomorrow  monitor HR, metop adjustment as needed  cont antibiotics to complete course      Diet:  Diet, NPO:   Except Medications (12-19-19 @ 14:51)      Appetite: [ ]Poor [x  ]Adequate [  ]Good  Caloric intake:  [  x ]  Adequate   [   ] Inadequate    ROS: General/ GI see HPI  all other systems negative      ALLERGIES & MEDICATIONS  --------------------------------------------------------------------------------  No Known Allergies    STANDING INPATIENT MEDICATIONS    albuterol/ipratropium for Nebulization 3 milliLiter(s) Nebulizer every 6 hours  buDESOnide    Inhalation Suspension 0.5 milliGRAM(s) Inhalation every 12 hours  chlorhexidine 2% Cloths 1 Application(s) Topical <User Schedule>  diphenoxylate/atropine 2 Tablet(s) Oral every 6 hours  enoxaparin Injectable 40 milliGRAM(s) SubCutaneous daily  fat emulsion (Fish Oil and Plant Based) 20% Infusion 10.3 mL/Hr IV Continuous <Continuous>  insulin lispro (HumaLOG) corrective regimen sliding scale   SubCutaneous every 6 hours  loperamide 4 milliGRAM(s) Oral every 6 hours  metoprolol tartrate 12.5 milliGRAM(s) Oral every 6 hours  Parenteral Nutrition - Adult 1 Each TPN Continuous <Continuous>  Parenteral Nutrition - Adult 1 Each TPN Continuous <Continuous>  sodium chloride 0.9%. 1000 milliLiter(s) IV Continuous <Continuous>      PRN INPATIENT MEDICATION  ondansetron Injectable 4 milliGRAM(s) IV Push every 6 hours PRN        VITALS/PHYSICAL EXAM  --------------------------------------------------------------------------------  T(C): 37.1 (12-19-19 @ 15:00), Max: 37.6 (12-18-19 @ 23:00)  HR: 96 (12-19-19 @ 17:00) (89 - 116)  BP: 91/52 (12-19-19 @ 17:00) (82/53 - 122/63)  RR: 21 (12-19-19 @ 17:00) (20 - 74)  SpO2: 95% (12-19-19 @ 17:00) (92% - 99%)  Wt(kg): --        12-18-19 @ 07:01  -  12-19-19 @ 07:00  --------------------------------------------------------  IN: 3763.9 mL / OUT: 4820 mL / NET: -1056.1 mL    12-19-19 @ 07:01  -  12-19-19 @ 17:38  --------------------------------------------------------  IN: 825 mL / OUT: 1070 mL / NET: -245 mL    PHYSICAL EXAM  --------------------------------------------------------------------------------  	Gen: guarded but stable, A&Ox3, NC O2   	HEENT: NC/AT, PERRL, supple neck, clear oropharynx, mucosa moist  	Pulm: decreased BS B/L, Lt sided pigtail  	GI: (+) BS, softly distended, non tender,                    midline incision w/staples c/d/i w/o s/sx infection                   (+)ostomy pink viable- thick bilious liquid stool like material                   LLQ JOVANNA w/ serosanguinous drainage              MSK: FROM, no contractures nor deformities  	Vascular: Equally Warm, no clubbing, cyanosis, nor edema                        Rt PICC dressing c/d/I   	Neuro: No focal deficits, intact sensation, weakened strength  	Psych: Normal affect and mood  	Skin: Warm, without rashes, good turgor          LABS/ CULTURES/ RADIOLOGY:              11.0   17.32 >-----------<  479      [12-19-19 @ 00:34]              33.9     135  |  85  |  69  ----------------------------<  150      [12-19-19 @ 11:39]  4.1   |  37  |  0.93        Ca     8.6     [12-19-19 @ 11:39]      Mg     2.9     [12-19-19 @ 11:39]      Phos  4.4     [12-19-19 @ 11:39]    TPro  6.1  /  Alb  2.8  /  TBili  1.4  /  DBili  0.9  /  AST  28  /  ALT  30  /  AlkPhos  102  [12-19-19 @ 00:34]    CAPILLARY BLOOD GLUCOSE  POCT Blood Glucose.: 139 mg/dL (19 Dec 2019 17:11)  POCT Blood Glucose.: 139 mg/dL (19 Dec 2019 11:26)  POCT Blood Glucose.: 106 mg/dL (19 Dec 2019 06:36)  POCT Blood Glucose.: 112 mg/dL (18 Dec 2019 22:06)    Prealbumin, Serum: 5 mg/dL (12-13-19 @ 07:46)    Triglycerides, Serum: 131 mg/dL (12.17.19 @ 00:47)  Triglycerides, Serum: 107 mg/dL (12.16.19 @ 00:35)  Triglycerides, Serum: 122 mg/dL (12.15.19 @ 00:26)

## 2019-12-19 NOTE — PROGRESS NOTE ADULT - SUBJECTIVE AND OBJECTIVE BOX
Subjective: Patient seen and examined. No new events except as noted.   remains in ICU   sitting in chair   feels ok   no cp or sob       REVIEW OF SYSTEMS:    CONSTITUTIONAL:+ weakness, fevers or chills  EYES/ENT: No visual changes;  No vertigo or throat pain   NECK: No pain or stiffness  RESPIRATORY: No cough, wheezing, hemoptysis; No shortness of breath  CARDIOVASCULAR: No chest pain or palpitations  GASTROINTESTINAL: No abdominal or epigastric pain. No nausea, vomiting, or hematemesis; No diarrhea or constipation. No melena or hematochezia.  GENITOURINARY: No dysuria, frequency or hematuria  NEUROLOGICAL: No numbness or weakness  SKIN: No itching, burning, rashes, or lesions   All other review of systems is negative unless indicated above.    MEDICATIONS:  MEDICATIONS  (STANDING):  albuterol/ipratropium for Nebulization 3 milliLiter(s) Nebulizer every 6 hours  buDESOnide    Inhalation Suspension 0.5 milliGRAM(s) Inhalation every 12 hours  chlorhexidine 2% Cloths 1 Application(s) Topical <User Schedule>  diphenoxylate/atropine 2 Tablet(s) Oral every 6 hours  enoxaparin Injectable 40 milliGRAM(s) SubCutaneous daily  fat emulsion (Fish Oil and Plant Based) 20% Infusion 10.3 mL/Hr (10.3 mL/Hr) IV Continuous <Continuous>  insulin lispro (HumaLOG) corrective regimen sliding scale   SubCutaneous every 6 hours  loperamide 4 milliGRAM(s) Oral every 6 hours  metoprolol tartrate 12.5 milliGRAM(s) Oral every 6 hours  Parenteral Nutrition - Adult 1 Each (50 mL/Hr) TPN Continuous <Continuous>  Parenteral Nutrition - Adult 1 Each (50 mL/Hr) TPN Continuous <Continuous>  sodium chloride 0.9%. 1000 milliLiter(s) (10 mL/Hr) IV Continuous <Continuous>      PHYSICAL EXAM:  T(C): 36.6 (12-19-19 @ 11:00), Max: 37.6 (12-18-19 @ 23:00)  HR: 89 (12-19-19 @ 11:48) (89 - 116)  BP: 97/57 (12-19-19 @ 11:00) (84/52 - 122/63)  RR: 36 (12-19-19 @ 11:00) (20 - 74)  SpO2: 97% (12-19-19 @ 11:48) (92% - 100%)  Wt(kg): --  I&O's Summary    18 Dec 2019 07:01  -  19 Dec 2019 07:00  --------------------------------------------------------  IN: 3763.9 mL / OUT: 4820 mL / NET: -1056.1 mL    19 Dec 2019 07:01  -  19 Dec 2019 11:57  --------------------------------------------------------  IN: 200 mL / OUT: 0 mL / NET: 200 mL          Appearance: Lethargic   HEENT:   Dry oral mucosa RIJ line   Lymphatic: No lymphadenopathy   Cardiovascular: Normal S1 S2, No JVD, No murmurs , Peripheral pulses palpable 2+ bilaterally  Respiratory: Decreased bs   Gastrointestinal:  Soft, NT, ND. Ostomy pink with output. Midline staples in place, midline incision site is c/d/i   Skin: No rashes, No ecchymoses, No cyanosis, warm to touch  Musculoskeletal: Decreased  range of motion and strength  Psychiatry:  mildly sedated   Ext: No edema +PICC line   +rosas         LABS:    CARDIAC MARKERS:                                11.0   17.32 )-----------( 479      ( 19 Dec 2019 00:34 )             33.9     12-19    132<L>  |  83<L>  |  65<H>  ----------------------------<  117<H>  4.1   |  37<H>  |  0.91    Ca    8.4      19 Dec 2019 00:34  Phos  4.3     12-19  Mg     2.7     12-19    TPro  6.1  /  Alb  2.8<L>  /  TBili  1.4<H>  /  DBili  0.9<H>  /  AST  28  /  ALT  30  /  AlkPhos  102  12-19    proBNP:   Lipid Profile:   HgA1c:   TSH:             TELEMETRY: SR, PACs	    ECG:  	   RADIOLOGY:   < from: Xray Chest 1 View- PORTABLE-Urgent (12.18.19 @ 23:45) >    EXAM:  XR CHEST PORTABLE URGENT 1V                            PROCEDURE DATE:  12/18/2019            INTERPRETATION:  Chest one view    HISTORY: Left pneumothorax    Comparison: Earlier the same day    Radiographic examination shows the heart to benormal in size. Left pigtail catheter remains present. Small left pneumothorax is present in the lung apex and along the left lateral lung. Right PICC line is again identified.     IMPRESSION: Small left pneumothorax.       Thank you for this referral.                    AMBROCIO OSBORNE M.D., ATTENDING RADIOLOGIST  This document has been electronically signed. Dec 19 2019 10:53AM                < end of copied text >    DIAGNOSTIC TESTING:  [ ] Echocardiogram:  [ ]  Catheterization:  [ ] Stress Test:    OTHER:

## 2019-12-20 LAB
ALBUMIN SERPL ELPH-MCNC: 2.2 G/DL — LOW (ref 3.3–5)
ALP SERPL-CCNC: 107 U/L — SIGNIFICANT CHANGE UP (ref 40–120)
ALT FLD-CCNC: 27 U/L — SIGNIFICANT CHANGE UP (ref 10–45)
ANION GAP SERPL CALC-SCNC: 10 MMOL/L — SIGNIFICANT CHANGE UP (ref 5–17)
ANION GAP SERPL CALC-SCNC: 12 MMOL/L — SIGNIFICANT CHANGE UP (ref 5–17)
AST SERPL-CCNC: 22 U/L — SIGNIFICANT CHANGE UP (ref 10–40)
BILIRUB DIRECT SERPL-MCNC: 0.7 MG/DL — HIGH (ref 0–0.2)
BILIRUB INDIRECT FLD-MCNC: 0.4 MG/DL — SIGNIFICANT CHANGE UP (ref 0.2–1)
BILIRUB SERPL-MCNC: 1.1 MG/DL — SIGNIFICANT CHANGE UP (ref 0.2–1.2)
BUN SERPL-MCNC: 33 MG/DL — HIGH (ref 7–23)
BUN SERPL-MCNC: 54 MG/DL — HIGH (ref 7–23)
CALCIUM SERPL-MCNC: 7.9 MG/DL — LOW (ref 8.4–10.5)
CALCIUM SERPL-MCNC: 8.2 MG/DL — LOW (ref 8.4–10.5)
CHLORIDE SERPL-SCNC: 88 MMOL/L — LOW (ref 96–108)
CHLORIDE SERPL-SCNC: 91 MMOL/L — LOW (ref 96–108)
CO2 SERPL-SCNC: 33 MMOL/L — HIGH (ref 22–31)
CO2 SERPL-SCNC: 33 MMOL/L — HIGH (ref 22–31)
CREAT SERPL-MCNC: 0.69 MG/DL — SIGNIFICANT CHANGE UP (ref 0.5–1.3)
CREAT SERPL-MCNC: 0.83 MG/DL — SIGNIFICANT CHANGE UP (ref 0.5–1.3)
GLUCOSE BLDC GLUCOMTR-MCNC: 102 MG/DL — HIGH (ref 70–99)
GLUCOSE BLDC GLUCOMTR-MCNC: 106 MG/DL — HIGH (ref 70–99)
GLUCOSE BLDC GLUCOMTR-MCNC: 95 MG/DL — SIGNIFICANT CHANGE UP (ref 70–99)
GLUCOSE SERPL-MCNC: 115 MG/DL — HIGH (ref 70–99)
GLUCOSE SERPL-MCNC: 137 MG/DL — HIGH (ref 70–99)
HCT VFR BLD CALC: 29.4 % — LOW (ref 39–50)
HGB BLD-MCNC: 9.8 G/DL — LOW (ref 13–17)
MAGNESIUM SERPL-MCNC: 2.4 MG/DL — SIGNIFICANT CHANGE UP (ref 1.6–2.6)
MAGNESIUM SERPL-MCNC: 2.7 MG/DL — HIGH (ref 1.6–2.6)
MCHC RBC-ENTMCNC: 30.4 PG — SIGNIFICANT CHANGE UP (ref 27–34)
MCHC RBC-ENTMCNC: 33.3 GM/DL — SIGNIFICANT CHANGE UP (ref 32–36)
MCV RBC AUTO: 91.3 FL — SIGNIFICANT CHANGE UP (ref 80–100)
NRBC # BLD: 0 /100 WBCS — SIGNIFICANT CHANGE UP (ref 0–0)
PHOSPHATE SERPL-MCNC: 2.7 MG/DL — SIGNIFICANT CHANGE UP (ref 2.5–4.5)
PHOSPHATE SERPL-MCNC: 3 MG/DL — SIGNIFICANT CHANGE UP (ref 2.5–4.5)
PLATELET # BLD AUTO: 451 K/UL — HIGH (ref 150–400)
POTASSIUM SERPL-MCNC: 3.7 MMOL/L — SIGNIFICANT CHANGE UP (ref 3.5–5.3)
POTASSIUM SERPL-MCNC: 4 MMOL/L — SIGNIFICANT CHANGE UP (ref 3.5–5.3)
POTASSIUM SERPL-SCNC: 3.7 MMOL/L — SIGNIFICANT CHANGE UP (ref 3.5–5.3)
POTASSIUM SERPL-SCNC: 4 MMOL/L — SIGNIFICANT CHANGE UP (ref 3.5–5.3)
PREALB SERPL-MCNC: 15 MG/DL — LOW (ref 20–40)
PROT SERPL-MCNC: 5.8 G/DL — LOW (ref 6–8.3)
RBC # BLD: 3.22 M/UL — LOW (ref 4.2–5.8)
RBC # FLD: 13.5 % — SIGNIFICANT CHANGE UP (ref 10.3–14.5)
SODIUM SERPL-SCNC: 133 MMOL/L — LOW (ref 135–145)
SODIUM SERPL-SCNC: 134 MMOL/L — LOW (ref 135–145)
TRIGL SERPL-MCNC: 107 MG/DL — SIGNIFICANT CHANGE UP (ref 10–149)
WBC # BLD: 15.17 K/UL — HIGH (ref 3.8–10.5)
WBC # FLD AUTO: 15.17 K/UL — HIGH (ref 3.8–10.5)

## 2019-12-20 PROCEDURE — 71045 X-RAY EXAM CHEST 1 VIEW: CPT | Mod: 26,77

## 2019-12-20 PROCEDURE — 99233 SBSQ HOSP IP/OBS HIGH 50: CPT | Mod: GC

## 2019-12-20 PROCEDURE — 71045 X-RAY EXAM CHEST 1 VIEW: CPT | Mod: 26

## 2019-12-20 PROCEDURE — 99232 SBSQ HOSP IP/OBS MODERATE 35: CPT

## 2019-12-20 RX ORDER — ACETAMINOPHEN 500 MG
750 TABLET ORAL ONCE
Refills: 0 | Status: DISCONTINUED | OUTPATIENT
Start: 2019-12-20 | End: 2019-12-27

## 2019-12-20 RX ORDER — POTASSIUM CHLORIDE 20 MEQ
10 PACKET (EA) ORAL
Refills: 0 | Status: DISCONTINUED | OUTPATIENT
Start: 2019-12-20 | End: 2019-12-20

## 2019-12-20 RX ORDER — MORPHINE 10 MG/ML
6 SOLUTION ORAL
Refills: 0 | Status: DISCONTINUED | OUTPATIENT
Start: 2019-12-20 | End: 2019-12-21

## 2019-12-20 RX ORDER — CHLORHEXIDINE GLUCONATE 213 G/1000ML
1 SOLUTION TOPICAL
Refills: 0 | Status: DISCONTINUED | OUTPATIENT
Start: 2019-12-20 | End: 2020-01-10

## 2019-12-20 RX ORDER — I.V. FAT EMULSION 20 G/100ML
20.8 EMULSION INTRAVENOUS
Qty: 50 | Refills: 0 | Status: DISCONTINUED | OUTPATIENT
Start: 2019-12-20 | End: 2019-12-21

## 2019-12-20 RX ORDER — ELECTROLYTE SOLUTION,INJ
1 VIAL (ML) INTRAVENOUS
Refills: 0 | Status: DISCONTINUED | OUTPATIENT
Start: 2019-12-20 | End: 2019-12-20

## 2019-12-20 RX ORDER — POTASSIUM CHLORIDE 20 MEQ
20 PACKET (EA) ORAL
Refills: 0 | Status: COMPLETED | OUTPATIENT
Start: 2019-12-20 | End: 2019-12-20

## 2019-12-20 RX ADMIN — Medication 4 MILLIGRAM(S): at 05:28

## 2019-12-20 RX ADMIN — CHLORHEXIDINE GLUCONATE 1 APPLICATION(S): 213 SOLUTION TOPICAL at 05:28

## 2019-12-20 RX ADMIN — Medication 12.5 MILLIGRAM(S): at 18:14

## 2019-12-20 RX ADMIN — Medication 2 TABLET(S): at 05:28

## 2019-12-20 RX ADMIN — Medication 50 MILLIEQUIVALENT(S): at 04:33

## 2019-12-20 RX ADMIN — MORPHINE 6 MILLIGRAM(S): 10 SOLUTION ORAL at 12:32

## 2019-12-20 RX ADMIN — ENOXAPARIN SODIUM 40 MILLIGRAM(S): 100 INJECTION SUBCUTANEOUS at 12:32

## 2019-12-20 RX ADMIN — Medication 2 TABLET(S): at 18:14

## 2019-12-20 RX ADMIN — Medication 3 MILLILITER(S): at 00:38

## 2019-12-20 RX ADMIN — Medication 2 TABLET(S): at 23:38

## 2019-12-20 RX ADMIN — Medication 50 MILLIEQUIVALENT(S): at 05:45

## 2019-12-20 RX ADMIN — MORPHINE 6 MILLIGRAM(S): 10 SOLUTION ORAL at 23:38

## 2019-12-20 RX ADMIN — Medication 0.5 MILLIGRAM(S): at 17:22

## 2019-12-20 RX ADMIN — Medication 2 TABLET(S): at 12:32

## 2019-12-20 RX ADMIN — Medication 3 MILLILITER(S): at 17:22

## 2019-12-20 RX ADMIN — Medication 1 EACH: at 18:15

## 2019-12-20 RX ADMIN — Medication 4 MILLIGRAM(S): at 12:33

## 2019-12-20 RX ADMIN — Medication 4 MILLIGRAM(S): at 18:14

## 2019-12-20 RX ADMIN — Medication 12.5 MILLIGRAM(S): at 13:59

## 2019-12-20 RX ADMIN — Medication 4 MILLIGRAM(S): at 23:38

## 2019-12-20 RX ADMIN — MORPHINE 6 MILLIGRAM(S): 10 SOLUTION ORAL at 18:14

## 2019-12-20 RX ADMIN — I.V. FAT EMULSION 20.8 ML/HR: 20 EMULSION INTRAVENOUS at 18:15

## 2019-12-20 RX ADMIN — Medication 3 MILLILITER(S): at 11:30

## 2019-12-20 NOTE — PROGRESS NOTE ADULT - ASSESSMENT
75 y/o M presenting with septic shock and high grade SBO s/p exploratory laparotomy, lysis of adhesions, decompression of bowel via enterotomy w/ primary repair, and Abthera VAC placement on 12/6; s/p take down of Abthera, washout and re-application of the Abthera vac on 12/8. Now s/p SBR and end ileostomy on 12/10 acute respiratory distress now improving, Pneumothorax, hyperglycemia, delirium.    PLAN:    Neuro: A&Ox3   - off sedation and standing pain meds    Resp: acute respiratory failure upon initial presentation, COPD at baseline, spontaneous left sided pneumothorax s/p pig-tail catheter placement.   - Continue to monitor on RA  - Continue Duonebs, budesonide  - Left sided pigtail to water seal, f/u AM CXR    CV: septic shock requiring vasopressor support, now hemodynamically stable   - metoprolol 12.5 q6 hrs    GI: SBO s/p exploratory laparotomy, lysis of adhesions, decompression of bowel via enterotomy w/ primary repair, and Abthera VAC placement and removal with 150 cm of small bowel removed and end ileostomy.   - Regular diet w/ Ensure and TPN (calorie count)  - Monitor ostomy output, 1 : 1 repletions, lomotil 2 tabs q6hrs & Imodium 4 mg q6hrs    Renal: CHI, resolved   - Replete electrolytes as needed  - Monitor I/Os    Heme: no acute issues  - Lovenox for VTE prophylaxis  - SCDs     ID: No active issues   - Trend WBC, monitor fever curve    Endo: no acute issues  - Started on sliding scale with initiation of TPN, q6 fingersticks

## 2019-12-20 NOTE — PROGRESS NOTE ADULT - SUBJECTIVE AND OBJECTIVE BOX
HISTORY:  74y Male past medical history of COPD and EtOH dependence who presented 12/6 with abdominal pain, nausea, hematemesis, and poor PO intake for ~2-3 days. Labs significant for an CHI w/ Cr 2.74 and lactate of 9.4. He was also notably tachycardic to the 110s and hypotensive w/ SBP in the 70s. He was given 2 units of PRBCs and 2 L of LR in the ED due to concern for upper GI bleeding. Imaging revealed high grade SBO in the RLQ. Patient was taken to the OR emergently for an exploratory laparotomy, lysis of adhesions, decompression of bowel via enterotomy w/ primary repair, and Abthera VAC placement. Of note, the distal 50% of the bowel appeared dusky but viable. He required vasopressor support with phenylephrine and vasopressin infusions. He received 3000 mL of crystalloid w/ EBL of 10 mL and UOP of 25 mL. Patient was left intubated at the end of the case so SICU was consulted for hemodynamic monitoring. Taken back to the OR on 12/8 and underwent take down of Abthera, washout and re-application of the Abthera vac. Went back to OR on 12/10 night for colectomy.    24 HOUR EVENTS:  - Increased Lomotil from 1 tab q6hrs to 2 tabs PO q6hrs and added Imodium 4 mg PO q6hrs  - 4750 mL of ileostomy output over 24 hours  - Changed ostomy output repletions w/ NS from 0.5:1 to 1:1, required additional 500 mL bolus of NS for hypotension w/ SBP in the 90s  - Made NPO due to concern for aspiration    SUBJECTIVE/ROS:  [x] A ten-point review of systems was otherwise negative except as noted.  [ ] Due to altered mental status/intubation, subjective information were not able to be obtained from the patient. History was obtained, to the extent possible, from review of the chart and collateral sources of information.    NEURO  Exam: awake, alert, oriented x4, no acute distress, no focal deficits  Meds: acetaminophen  IVPB .. 750 milliGRAM(s) IV Intermittent once PRN Mild Pain (1 - 3)  [x] Adequacy of sedation and pain control has been assessed and adjusted    RESPIRATORY  RR: 24 (12-20-19 @ 06:00) (18 - 37)  SpO2: 96% (12-20-19 @ 06:00) (91% - 100%)  Exam: clear to auscultation bilaterally  Mechanical Ventilation: no  [N/A] Extubation Readiness Assessed  Meds:  - albuterol/ipratropium for Nebulization 3 milliLiter(s) Nebulizer every 6 hours  - buDESOnide    Inhalation Suspension 0.5 milliGRAM(s) Inhalation every 12 hours    CARDIOVASCULAR  HR: 86 (12-20-19 @ 06:00) (84 - 109)  BP: 93/54 (12-20-19 @ 06:00) (82/53 - 101/54)  BP(mean): 69 (12-20-19 @ 06:00) (60 - 73)  Exam: regular rate and rhythm, S1S2  Cardiac Rhythm: sinus  Perfusion    [x]Adequate    [ ]Inadequate  Mentation   [x]Normal       [ ]Reduced  Extremities  [x]Warm         [ ]Cool  Volume Status [ ]Hypervolemic [x]Euvolemic [ ]Hypovolemic  Meds: metoprolol tartrate 12.5 milliGRAM(s) Oral every 6 hours    GI/NUTRITION  Exam: soft, nondistended, mild sawyer-incisional tenderness, ileostomy pink & viable with bilious output  Diet: dysphagia 2  Meds:  - diphenoxylate/atropine 2 Tablet(s) Oral every 6 hours  - fat emulsion (Fish Oil and Plant Based) 20% Infusion 10.3 mL/Hr IV Continuous <Continuous>  - loperamide 4 milliGRAM(s) Oral every 6 hours  - ondansetron Injectable 4 milliGRAM(s) IV Push every 6 hours PRN Nausea and/or Vomiting  - Parenteral Nutrition - Adult 1 Each TPN Continuous <Continuous>    GENITOURINARY  I&O's Detail  12-19 @ 07:01  -  12-20 @ 06:52  --------------------------------------------------------  IN:    fat emulsion (Fish Oil and Plant Based) 20% Infusion: 154.5 mL    IV PiggyBack: 200 mL    sodium chloride 0.9%: 325 mL    sodium chloride 0.9%.: 4100 mL    TPN (Total Parenteral Nutrition): 1200 mL  Total IN: 5979.5 mL    OUT:    Bulb: 20 mL    Ileostomy: 4750 mL    Incontinent per Condom Catheter: 1050 mL  Total OUT: 5820 mL    Total NET: 159.5 mL    133<L>  |  88<L>  |  54<H>  ----------------------------<  115<H>  3.7   |  33<H>  |  0.83    Ca    8.2<L>      20 Dec 2019 01:04  Phos  3.0  Mg     2.7  TPro  5.8<L>  /  Alb  2.2<L>  /  TBili  1.1  /  DBili  0.7<H>  /  AST  22  /  ALT  27  /  AlkPhos  107    [ ] Mratinez catheter, indication: N/A  Meds: sodium chloride 0.9% for 1:1 repletions of ileostomy output    HEMATOLOGIC  Meds: enoxaparin Injectable 40 milliGRAM(s) SubCutaneous daily  [x] VTE Prophylaxis                        9.8    15.17 )-----------( 451      ( 20 Dec 2019 00:57 )             29.4     INFECTIOUS DISEASES  T(C): 36.8 (12-20-19 @ 03:00), Max: 37.2 (12-19-19 @ 19:00)  WBC Count: 15.17 K/uL (12-20 @ 00:57)  Recent Cultures: none  Meds: none    ENDOCRINE  Capillary Blood Glucose:  - 102 mg/dL (20 Dec 2019 05:38)  - 121 mg/dL (19 Dec 2019 22:59)  - 139 mg/dL (19 Dec 2019 17:11)  - 139 mg/dL (19 Dec 2019 11:26)  Meds: insulin lispro (HumaLOG) corrective regimen sliding scale   SubCutaneous every 6 hours    ACCESS DEVICES:  [x] Peripheral IV  [ ] Central Venous Line	[ ] R	[ ] L	[ ] IJ	[ ] Fem	[ ] SC	Placed:   [ ] Arterial Line		[ ] R	[ ] L	[ ] Fem	[ ] Rad	[ ] Ax	Placed:   [x] PICC:	RUE PICC placed on 12/14			[ ] Mediport  [ ] Urinary Catheter, Date Placed:   [x] Necessity of urinary, arterial, and venous catheters discussed    OTHER MEDICATIONS: chlorhexidine 2% Cloths 1 Application(s) Topical <User Schedule>    CODE STATUS: Full code    IMAGING:

## 2019-12-20 NOTE — CHART NOTE - NSCHARTNOTEFT_GEN_A_CORE
Nutrition Follow Up Note  Patient seen for: Nutrition follow up    Chart reviewed, events noted. "73 y/o M presenting with septic shock and high grade SBO s/p exploratory laparotomy, lysis of adhesions, decompression of bowel via enterotomy w/ primary repair, and Abthera VAC placement on ; s/p take down of Abthera, washout and re-application of the Abthera vac on . Now s/p SBR and end ileostomy on 12/10." Hospital course c/b high ostomy output. See below. Started on imodium as ordered. TPN changed to 1/2 strength on .     Source: TPN team, RN, patient    Diet: Dysphagia 2 mechanical soft diet with nectar consistency fluids.   Per TPN team, pt noted with coughing while eating with concern for aspiration; diet ultimately changed from Regular with Ensure Enlive three times daily (ordered on ).     Patient reports:     PO intake :     Source for PO intake:     Enteral /Parenteral Nutrition:       Daily Weight in k.1 (-20), Weight in k.7 (-), Weight in k.3 (-18), Weight in k.1 (-17)  % Weight Change    Pertinent Medications: MEDICATIONS  (STANDING):  albuterol/ipratropium for Nebulization 3 milliLiter(s) Nebulizer every 6 hours  buDESOnide    Inhalation Suspension 0.5 milliGRAM(s) Inhalation every 12 hours  chlorhexidine 2% Cloths 1 Application(s) Topical <User Schedule>  diphenoxylate/atropine 2 Tablet(s) Oral every 6 hours  enoxaparin Injectable 40 milliGRAM(s) SubCutaneous daily  fat emulsion (Fish Oil and Plant Based) 20% Infusion 20.8 mL/Hr (20.8 mL/Hr) IV Continuous <Continuous>  insulin lispro (HumaLOG) corrective regimen sliding scale   SubCutaneous every 6 hours  loperamide 4 milliGRAM(s) Oral every 6 hours  metoprolol tartrate 12.5 milliGRAM(s) Oral every 6 hours  opium Tincture 6 milliGRAM(s) Oral four times a day  Parenteral Nutrition - Adult 1 Each (100 mL/Hr) TPN Continuous <Continuous>  Parenteral Nutrition - Adult 1 Each (50 mL/Hr) TPN Continuous <Continuous>  sodium chloride 0.9%. 1000 milliLiter(s) (10 mL/Hr) IV Continuous <Continuous>    MEDICATIONS  (PRN):  acetaminophen  IVPB .. 750 milliGRAM(s) IV Intermittent once PRN Mild Pain (1 - 3)  ondansetron Injectable 4 milliGRAM(s) IV Push every 6 hours PRN Nausea and/or Vomiting    Pertinent Labs:  @ 01:04: Na 133<L>, BUN 54<H>, Cr 0.83, <H>, K+ 3.7, Phos 3.0, Mg 2.7<H>, Alk Phos 107, ALT/SGPT 27, AST/SGOT 22, HbA1c --    Finger Sticks:  POCT Blood Glucose.: 106 mg/dL ( @ 13:04)  POCT Blood Glucose.: 102 mg/dL ( @ 05:38)  POCT Blood Glucose.: 121 mg/dL ( @ 22:59)  POCT Blood Glucose.: 139 mg/dL ( @ 17:11)      Skin per nursing documentation:   Edema:    Estimated Needs:   [ ] no change since previous assessment  [ ] recalculated:     Previous Nutrition Diagnosis:   Nutrition Diagnosis is:    New Nutrition Diagnosis:  Related to:    As evidenced by:      Interventions:     Recommend  1)    Monitoring and Evaluation:     Continue to monitor Nutritional intake, Tolerance to diet prescription, weights, labs, skin integrity    RD remains available upon request and will follow up per protocol Nutrition Follow Up Note  Patient seen for: Nutrition follow up    Chart reviewed, events noted. "73 y/o M presenting with septic shock and high grade SBO s/p exploratory laparotomy, lysis of adhesions, decompression of bowel via enterotomy w/ primary repair, and Abthera VAC placement on ; s/p take down of Abthera, washout and re-application of the Abthera vac on . Now s/p SBR and end ileostomy on 12/10." Hospital course c/b high ostomy output. See below. Started on imodium as ordered. TPN changed to 1/2 strength on .     Source: TPN team, RN, patient    Nutrition Status: Severe Malnutrition. S/P GI surgery x 3; 150 cm small bowel remaining. Pt extubated , with episode of emesis . NGT self-removed then replaced on . TPN Team consulted ; TPN initiated . TPN changed to 1/2 strength on , now with plan to resume at full-strength this PM (). Pt noted with hx of hyperglycemia; insulin in TPN (10 units).     Diet: Dysphagia 2 mechanical soft diet with nectar consistency fluids.   Per TPN team, pt noted with coughing while eating with concern for aspiration; diet ultimately changed from Regular () to current consistency ().     Per discussion with pt, reports consuming "most" of breakfast tray this AM (). Endorsed consistency change and appeared aware of need for thickened liquids as ordered at this time. Pt amenable to receive health shakes three times daily (chocolate). Denies abdominal pain, nausea/vomiting at this time.     Parental Nutrition: presumed TPN (ordered ): To infuse at 100ml/hr (120Gm amino acids, 210Gm dextrose, 50Gm SMOF lipids) to provide: 1694kcal/day (31kcal/Kg and 2.2Gm protein/Kg per dosing wt 54.2Kg); with 10ml MVI and 3ml MTE-5.     Non-Protein Calories: 1214 gregorio/day (22 gregorio/Kg)  Dextrose Infusion Rate: 2.7 mg/Kg/min  Lipid Infusion Rate: 0.92 Gm/Kg/day; 0.08 Gm/Kg/hr     Ostomy output (per nursing flow sheet):  (): 3150ml   (): 4600ml  (): 3540ml  (): 2900ml     Daily Weight in k.1 (-), Weight in k.7 (-), Weight in k.3 (-), Weight in k.1 (-)  % Weight Change    Pertinent Medications: MEDICATIONS  (STANDING):  albuterol/ipratropium for Nebulization 3 milliLiter(s) Nebulizer every 6 hours  buDESOnide    Inhalation Suspension 0.5 milliGRAM(s) Inhalation every 12 hours  chlorhexidine 2% Cloths 1 Application(s) Topical <User Schedule>  diphenoxylate/atropine 2 Tablet(s) Oral every 6 hours  enoxaparin Injectable 40 milliGRAM(s) SubCutaneous daily  fat emulsion (Fish Oil and Plant Based) 20% Infusion 20.8 mL/Hr (20.8 mL/Hr) IV Continuous <Continuous>  insulin lispro (HumaLOG) corrective regimen sliding scale   SubCutaneous every 6 hours  loperamide 4 milliGRAM(s) Oral every 6 hours  metoprolol tartrate 12.5 milliGRAM(s) Oral every 6 hours  opium Tincture 6 milliGRAM(s) Oral four times a day  Parenteral Nutrition - Adult 1 Each (100 mL/Hr) TPN Continuous <Continuous>  Parenteral Nutrition - Adult 1 Each (50 mL/Hr) TPN Continuous <Continuous>  sodium chloride 0.9%. 1000 milliLiter(s) (10 mL/Hr) IV Continuous <Continuous>    MEDICATIONS  (PRN):  acetaminophen  IVPB .. 750 milliGRAM(s) IV Intermittent once PRN Mild Pain (1 - 3)  ondansetron Injectable 4 milliGRAM(s) IV Push every 6 hours PRN Nausea and/or Vomiting    Pertinent Labs:  @ 01:04: Na 133<L>, BUN 54<H>, Cr 0.83, <H>, K+ 3.7, Phos 3.0, Mg 2.7<H>, Alk Phos 107, ALT/SGPT 27, AST/SGOT 22, HbA1c --    Finger Sticks:  POCT Blood Glucose.: 106 mg/dL ( @ 13:04)  POCT Blood Glucose.: 102 mg/dL ( @ 05:38)  POCT Blood Glucose.: 121 mg/dL ( @ 22:59)  POCT Blood Glucose.: 139 mg/dL ( @ 17:11)      Skin per nursing documentation: surgical incision midline abdominal  Edema: 1+ generalized;  dependent    Estimated Needs:   [x] no change since previous assessment  based on dosing wt 54.2Kg, with consideration for TPN, malnutrition  9525-0133 gregorio/day (25-30cal/Kg)   Gm protein/day (1.8-2.2Gm/Kg  [ ] recalculated:     Previous Nutrition Diagnosis: severe malnutrition   Nutrition Diagnosis is: ongoing, being addressed with TPN and PO diet    Interventions:   1) TPN per TPN Team/Nutrition assessment  2) PO diet as per discretion of medical team; consistency as per medical team, SLP.   3) RD to add Mighty Shakes (chocolate) TID (+200kcal, +7g protein/serving).   Recommend  1)    Monitoring and Evaluation:     Continue to monitor Nutritional intake, Tolerance to diet prescription, weights, labs, skin integrity    RD remains available upon request and will follow up per protocol Nutrition Follow Up Note  Patient seen for: Nutrition follow up    Chart reviewed, events noted. "75 y/o M presenting with septic shock and high grade SBO s/p exploratory laparotomy, lysis of adhesions, decompression of bowel via enterotomy w/ primary repair, and Abthera VAC placement on ; s/p take down of Abthera, washout and re-application of the Abthera vac on . Now s/p SBR and end ileostomy on 12/10." Hospital course c/b high ostomy output. See below. Started on imodium as ordered. TPN changed to 1/2 strength on .     Source: TPN team, RN, patient    Nutrition Status: Severe Malnutrition. S/P GI surgery x 3; 150 cm small bowel remaining. Pt extubated , with episode of emesis . NGT self-removed then replaced on . TPN Team consulted ; TPN initiated . TPN changed to 1/2 strength on , now with plan to resume at full-strength this PM (). Pt noted with hx of hyperglycemia; insulin in TPN (10 units).     Diet: Dysphagia 2 mechanical soft diet with nectar consistency fluids.   Per TPN team, pt noted with coughing while eating with concern for aspiration; diet ultimately changed from Regular () to current consistency ().     Per discussion with pt, reports consuming "most" of breakfast tray this AM (). Endorsed consistency change and appeared aware of need for thickened liquids as ordered at this time. Pt amenable to receive health shakes three times daily (chocolate). Denies abdominal pain, nausea/vomiting at this time.     Parental Nutrition: presumed TPN (ordered ): To infuse at 100ml/hr (120Gm amino acids, 210Gm dextrose, 50Gm SMOF lipids) to provide: 1694kcal/day (31kcal/Kg and 2.2Gm protein/Kg per dosing wt 54.2Kg); with 10ml MVI and 3ml MTE-5.     Non-Protein Calories: 1214 gregorio/day (22 gregorio/Kg)  Dextrose Infusion Rate: 2.7 mg/Kg/min  Lipid Infusion Rate: 0.92 Gm/Kg/day; 0.08 Gm/Kg/hr     Ostomy output (per nursing flow sheet):  (): 3150ml   (): 4600ml  (): 3540ml  (): 2900ml     Daily Weight in k.1 (-), Weight in k.7 (-), Weight in k.3 (-), Weight in k.1 (-)  % Weight Change    Pertinent Medications: MEDICATIONS  (STANDING):  albuterol/ipratropium for Nebulization 3 milliLiter(s) Nebulizer every 6 hours  buDESOnide    Inhalation Suspension 0.5 milliGRAM(s) Inhalation every 12 hours  chlorhexidine 2% Cloths 1 Application(s) Topical <User Schedule>  diphenoxylate/atropine 2 Tablet(s) Oral every 6 hours  enoxaparin Injectable 40 milliGRAM(s) SubCutaneous daily  fat emulsion (Fish Oil and Plant Based) 20% Infusion 20.8 mL/Hr (20.8 mL/Hr) IV Continuous <Continuous>  insulin lispro (HumaLOG) corrective regimen sliding scale   SubCutaneous every 6 hours  loperamide 4 milliGRAM(s) Oral every 6 hours  metoprolol tartrate 12.5 milliGRAM(s) Oral every 6 hours  opium Tincture 6 milliGRAM(s) Oral four times a day  Parenteral Nutrition - Adult 1 Each (100 mL/Hr) TPN Continuous <Continuous>  Parenteral Nutrition - Adult 1 Each (50 mL/Hr) TPN Continuous <Continuous>  sodium chloride 0.9%. 1000 milliLiter(s) (10 mL/Hr) IV Continuous <Continuous>    MEDICATIONS  (PRN):  acetaminophen  IVPB .. 750 milliGRAM(s) IV Intermittent once PRN Mild Pain (1 - 3)  ondansetron Injectable 4 milliGRAM(s) IV Push every 6 hours PRN Nausea and/or Vomiting    Pertinent Labs:  @ 01:04: Na 133<L>, BUN 54<H>, Cr 0.83, <H>, K+ 3.7, Phos 3.0, Mg 2.7<H>, Alk Phos 107, ALT/SGPT 27, AST/SGOT 22, HbA1c --    Finger Sticks:  POCT Blood Glucose.: 106 mg/dL ( @ 13:04)  POCT Blood Glucose.: 102 mg/dL ( @ 05:38)  POCT Blood Glucose.: 121 mg/dL ( @ 22:59)  POCT Blood Glucose.: 139 mg/dL ( @ 17:11)      Skin per nursing documentation: surgical incision midline abdominal  Edema: 1+ generalized;  dependent    Estimated Needs:   [x] no change since previous assessment  based on dosing wt 54.2Kg, with consideration for TPN, malnutrition  7386-7715 gregorio/day (25-30cal/Kg)   Gm protein/day (1.8-2.2Gm/Kg  [ ] recalculated:     Previous Nutrition Diagnosis: severe malnutrition   Nutrition Diagnosis is: ongoing, being addressed with TPN and PO diet    Interventions:   1) TPN per TPN Team/Nutrition assessment  2) PO diet as per discretion of medical team; consistency as per medical team, SLP.   3) RD to add Mighty Shakes (chocolate) TID (+200kcal, +7g protein/serving).     Recommend    Monitoring and Evaluation:     Continue to monitor Nutritional intake, Tolerance to diet prescription, weights, labs, skin integrity    RD remains available upon request and will follow up per protocol

## 2019-12-20 NOTE — PROGRESS NOTE ADULT - ASSESSMENT
A/P: 74 year old male w/ PMH of COPD and EtOH dependence with PSH/o appy, prostate surgery, & spine surgery  septic shock and high grade SBO s/p exploratory laparotomy, lysis of adhesions, decompression of bowel via enterotomy w/ primary repair, and Abthera VAC placement on 12/6; s/p take down of Abthera, washout and re-application of the Abthera vac on 12/8. Now s/p SBR and end ileostomy on 12/10.   TPN consulted to assist w/ management of pt's nutrition in pt w/ prolonged hospital course now tolerating diet but has high Ileostomy output     TPN at full strength today  pt w/ severe Protein-Calorie Malnutrition-  Pt on dysphagia diet, but plan only to allow to eat to do 'bedside' swallow study  Awaiting official speech and swallow     TPN GOAL:  120 grams amino acids (800ml 15% a.a.)  210 grams dextrose (300ml 70% dex)  50 grams SMOFlipid (250ml 20%IVFE @ 20.8ml/hr x 12 hours)  in 2400mL volume  Micronutrients: 10ml MVI, 3ml MTE-5      HypoNa & ELevated Bicarb- continue to monitor- improving with increased hydration         replete as per SICU        Continue to monitor for CHI -BUN/ Crt - improving        HypoK improving with increased KCl in TPN  PICC w/dedicated port for only TPN - maintenance as per protocol  Strict Intake and Output- high ileostomy output - replete with NS as per SICU continue to monitor      pt now on lomotil, imodium, and tincture of opium  Weights three times a week  Monitor BMP, Mg, Ionized Ca, Phosphorus daily  Continue to monitor Triglycerides weekly and Pre-albumin weekly  Hyperglycemia - Insulin increased to 10U  for elevated FS as now back to full strength TPN      Fingersticks to monitor glucose every 6 hours until stable, may be decreased to twice a day      ISS coverage - to be ordered by primary team  Continue as per SICU / Surgery, will follow with you, D/w primary team    Andreina Hubbard PA-C  TPN team, pager [phone]  D/w Ana M Siegel

## 2019-12-20 NOTE — PROGRESS NOTE ADULT - ATTENDING COMMENTS
pt seen and examined.  agree with note above.  f/u CT surgery consult for persistent air leak.  cont tpn, replacement fluid for ongoing high output  please make sure to give antidiarrheal agents preferably 30 min prior to meals  strict i/os  oob with PT  continue supportive care per SICU  patient remains in guarded condition

## 2019-12-20 NOTE — PROGRESS NOTE ADULT - SUBJECTIVE AND OBJECTIVE BOX
Surgery Progress Note  Patient is a 74y old  Male who presents with a chief complaint of abd. pain (20 Dec 2019 10:44)      SUBJECTIVE: Patient seen and examined at bedside with surgical team.     24 HOUR EVENTS:  - Increased Lomotil from 1 tab q6hrs to 2 tabs PO q6hrs and added Imodium 4 mg PO q6hrs  - 4750 mL of ileostomy output over 24 hours  - Changed ostomy output repletions w/ NS from 0.5:1 to 1:1, required additional 500 mL bolus of NS for hypotension w/ SBP in the 90s  - Made NPO due to concern for aspiration    Vital Signs Last 24 Hrs  T(C): 37 (20 Dec 2019 11:00), Max: 37.2 (19 Dec 2019 19:00)  T(F): 98.6 (20 Dec 2019 11:00), Max: 99 (19 Dec 2019 19:00)  HR: 97 (20 Dec 2019 13:00) (81 - 99)  BP: 118/57 (20 Dec 2019 13:00) (80/48 - 131/60)  BP(mean): 81 (20 Dec 2019 13:00) (59 - 87)  RR: 42 (20 Dec 2019 13:00) (18 - 42)  SpO2: 91% (20 Dec 2019 13:00) (91% - 100%)    Physical Exam  Constitutional: NAD  Respiratory: pig tail in pig with very mild forced expiratory leak   Cardio: normotensive   Abd: ostomy is pink with gas and stool in bag     I&O's Detail    19 Dec 2019 07:01  -  20 Dec 2019 07:00  --------------------------------------------------------  IN:    fat emulsion (Fish Oil and Plant Based) 20% Infusion: 144.2 mL    IV PiggyBack: 200 mL    sodium chloride 0.9%: 325 mL    sodium chloride 0.9%.: 4100 mL    TPN (Total Parenteral Nutrition): 1200 mL  Total IN: 5969.2 mL    OUT:    Bulb: 20 mL    Ileostomy: 4750 mL    Incontinent per Condom Catheter: 1050 mL  Total OUT: 5820 mL    Total NET: 149.2 mL      20 Dec 2019 07:01  -  20 Dec 2019 15:48  --------------------------------------------------------  IN:    TPN (Total Parenteral Nutrition): 300 mL  Total IN: 300 mL    OUT:    Ileostomy: 1800 mL  Total OUT: 1800 mL    Total NET: -1500 mL      MEDICATIONS  (STANDING):  albuterol/ipratropium for Nebulization 3 milliLiter(s) Nebulizer every 6 hours  buDESOnide    Inhalation Suspension 0.5 milliGRAM(s) Inhalation every 12 hours  chlorhexidine 2% Cloths 1 Application(s) Topical <User Schedule>  diphenoxylate/atropine 2 Tablet(s) Oral every 6 hours  enoxaparin Injectable 40 milliGRAM(s) SubCutaneous daily  fat emulsion (Fish Oil and Plant Based) 20% Infusion 20.8 mL/Hr (20.8 mL/Hr) IV Continuous <Continuous>  insulin lispro (HumaLOG) corrective regimen sliding scale   SubCutaneous every 6 hours  loperamide 4 milliGRAM(s) Oral every 6 hours  metoprolol tartrate 12.5 milliGRAM(s) Oral every 6 hours  opium Tincture 6 milliGRAM(s) Oral four times a day  Parenteral Nutrition - Adult 1 Each (100 mL/Hr) TPN Continuous <Continuous>  Parenteral Nutrition - Adult 1 Each (50 mL/Hr) TPN Continuous <Continuous>  sodium chloride 0.9%. 1000 milliLiter(s) (10 mL/Hr) IV Continuous <Continuous>    MEDICATIONS  (PRN):  acetaminophen  IVPB .. 750 milliGRAM(s) IV Intermittent once PRN Mild Pain (1 - 3)  ondansetron Injectable 4 milliGRAM(s) IV Push every 6 hours PRN Nausea and/or Vomiting      LABS:                        9.8    15.17 )-----------( 451      ( 20 Dec 2019 00:57 )             29.4     12-20    133<L>  |  88<L>  |  54<H>  ----------------------------<  115<H>  3.7   |  33<H>  |  0.83    Ca    8.2<L>      20 Dec 2019 01:04  Phos  3.0     12-20  Mg     2.7     12-20    TPro  5.8<L>  /  Alb  2.2<L>  /  TBili  1.1  /  DBili  0.7<H>  /  AST  22  /  ALT  27  /  AlkPhos  107  12-20      LIVER FUNCTIONS - ( 20 Dec 2019 01:04 )  Alb: 2.2 g/dL / Pro: 5.8 g/dL / ALK PHOS: 107 U/L / ALT: 27 U/L / AST: 22 U/L / GGT: x

## 2019-12-20 NOTE — PROGRESS NOTE ADULT - SUBJECTIVE AND OBJECTIVE BOX
[facility] NUTRITION SUPPORT / TPN -- FOLLOW UP NOTE  --------------------------------------------------------------------------------    24 hour events/subjective:  74y Male with a past medical history of COPD and EtOH dependence who presented 12/6 with abdominal pain, nausea, hematemesis, and poor PO intake for ~2-3 days. Labs significant for an CHI w/ Cr 2.74 and lactate of 9.4. He was also notably tachycardic to the 110s and hypotensive w/ SBP in the 70s. He was given 2 units of PRBCs and 2 L of LR in the ED due to concern for upper GI bleeding. Imaging revealed high grade SBO in the RLQ. Patient was taken to the OR emergently for an exploratory laparotomy, lysis of adhesions, decompression of bowel via enterotomy w/ primary repair, and Abthera VAC placement. Of note, the distal 50% of the bowel appeared dusky but viable. He required vasopressor support with phenylephrine and vasopressin infusions. He received 3000 mL of crystalloid w/ EBL of 10 mL and UOP of 25 mL. Patient was left intubated at the end of the case so SICU was consulted for hemodynamic monitoring. Taken back to the OR on 12/8 and underwent take down of Abthera, washout and re-application of the Abthera vac. Went back to OR on 12/10 night for colectomy with end ileostomy/ mucous fistula.    Pt w/ Lt PTX - 12/15 - pigtail cath placed- pt doing well on RA      continue mngt as per SICU      consult thoracic for possible pleurodesis  no n/v   no abdominal pain  High ostomy output increased to 4.7L - SICU to replace volume prn       lomotil and immodium w/ tincture of opium added  limited pt diet to when he's observed to so bedside swallow study-      awaiting official eval concern for aspiration  Resume TPN  monitor HR, metop adjustment as needed  cont antibiotics to complete course       leukocytosis improving off abx    Diet:  Diet, Dysphagia 2 Mechanical Soft-Nectar Consistency Fluid (12-20-19 @ 06:05)      Appetite: [  ]Poor [ x ]Adequate [  ]Good  Caloric intake:  [  x ]  Adequate   [   ] Inadequate    ROS: General/ GI see HPI  all other systems negative      ALLERGIES & MEDICATIONS  --------------------------------------------------------------------------------  No Known Allergies    STANDING INPATIENT MEDICATIONS    albuterol/ipratropium for Nebulization 3 milliLiter(s) Nebulizer every 6 hours  buDESOnide    Inhalation Suspension 0.5 milliGRAM(s) Inhalation every 12 hours  chlorhexidine 2% Cloths 1 Application(s) Topical <User Schedule>  diphenoxylate/atropine 2 Tablet(s) Oral every 6 hours  enoxaparin Injectable 40 milliGRAM(s) SubCutaneous daily  fat emulsion (Fish Oil and Plant Based) 20% Infusion 20.8 mL/Hr IV Continuous <Continuous>  insulin lispro (HumaLOG) corrective regimen sliding scale   SubCutaneous every 6 hours  loperamide 4 milliGRAM(s) Oral every 6 hours  metoprolol tartrate 12.5 milliGRAM(s) Oral every 6 hours  opium Tincture 6 milliGRAM(s) Oral four times a day  Parenteral Nutrition - Adult 1 Each TPN Continuous <Continuous>  Parenteral Nutrition - Adult 1 Each TPN Continuous <Continuous>  sodium chloride 0.9%. 1000 milliLiter(s) IV Continuous <Continuous>      PRN INPATIENT MEDICATION  acetaminophen  IVPB .. 750 milliGRAM(s) IV Intermittent once PRN  ondansetron Injectable 4 milliGRAM(s) IV Push every 6 hours PRN        VITALS/PHYSICAL EXAM  --------------------------------------------------------------------------------  T(C): 37 (12-20-19 @ 11:00), Max: 37.2 (12-19-19 @ 19:00)  HR: 93 (12-20-19 @ 16:00) (81 - 98)  BP: 93/52 (12-20-19 @ 16:00) (80/48 - 131/60)  RR: 28 (12-20-19 @ 16:00) (18 - 42)  SpO2: 96% (12-20-19 @ 16:00) (91% - 100%)  Wt(kg): --        12-19-19 @ 07:01  -  12-20-19 @ 07:00  --------------------------------------------------------  IN: 5969.2 mL / OUT: 5820 mL / NET: 149.2 mL    12-20-19 @ 07:01  -  12-20-19 @ 17:23  --------------------------------------------------------  IN: 450 mL / OUT: 1800 mL / NET: -1350 mL        PHYSICAL EXAM  --------------------------------------------------------------------------------  	Gen: guarded but stable, A&Ox3,   	HEENT: NC/AT, PERRL, supple neck, clear oropharynx, mucosa moist  	Pulm: decreased BS B/L, Lt sided pigtail  	GI: (+) BS, softly distended, non tender,                    midline incision w/staples c/d/i w/o s/sx infection                   (+)ostomy pink viable- thick bilious liquid stool like material                   LLQ JOVANNA w/ serosanguinous drainage              MSK: FROM, no contractures nor deformities  	Vascular: Equally Warm, no clubbing, cyanosis, nor edema                        Rt PICC dressing c/d/I   	Neuro: No focal deficits, intact sensation, weakened strength  	Psych: Normal affect and mood  	Skin: Warm, without rashes, good turgor    LABS/ CULTURES/ RADIOLOGY:              9.8    15.17 >-----------<  451      [12-20-19 @ 00:57]              29.4     133  |  88  |  54  ----------------------------<  115      [12-20-19 @ 01:04]  3.7   |  33  |  0.83        Ca     8.2     [12-20-19 @ 01:04]      Mg     2.7     [12-20-19 @ 01:04]      Phos  3.0     [12-20-19 @ 01:04]    TPro  5.8  /  Alb  2.2  /  TBili  1.1  /  DBili  0.7  /  AST  22  /  ALT  27  /  AlkPhos  107  [12-20-19 @ 01:04]    CAPILLARY BLOOD GLUCOSE  POCT Blood Glucose.: 106 mg/dL (20 Dec 2019 13:04)  POCT Blood Glucose.: 102 mg/dL (20 Dec 2019 05:38)  POCT Blood Glucose.: 121 mg/dL (19 Dec 2019 22:59)    Prealbumin, Serum: 15 mg/dL (12-20-19 @ 02:49)  Prealbumin, Serum: 5 mg/dL (12-13-19 @ 07:46)      Triglycerides, Serum: 107 mg/dL (12.20.19 @ 01:04)  Triglycerides, Serum: 131 mg/dL (12.17.19 @ 00:47)  Triglycerides, Serum: 107 mg/dL (12.16.19 @ 00:35)  Triglycerides, Serum: 122 mg/dL (12.15.19 @ 00:26)      < from: Xray Chest 1 View- PORTABLE-Routine (12.20.19 @ 07:05) >  FINDINGS:     The left costophrenic angle is excluded from the radiograph.  Right PICC line with tip overlying the SVC. Left chest tube is unchanged in position.   There is a small left pneumothorax, unchanged. The lungs are clear.  Heart size is within normal limits.    IMPRESSION:   Trace left pneumothorax is smaller in size since 12/20/2019..    < end of copied text >

## 2019-12-20 NOTE — PROGRESS NOTE ADULT - ATTENDING COMMENTS
ON:   increased antimotility  ileostomy 4.7 liters, net even after bolus fluids (alb 5%)  AM CXR stable  leukocytosis improving    - dc chest tube and repeat CXR 4hrs  - scale up the TPN to full dose  - add opium tincture  - close monitoring of ostomy output and balance  - cont with replacement of 1 to 1 NS  - cont w SSI   - off antibiotics  - remains in SICU for high output ON:   increased antimotility  ileostomy 4.7 liters, net even after bolus fluids (alb 5%)  AM CXR stable  leukocytosis improving    - clamp chest tube and repeat CXR 6hrs (has persistent airleak) if fail, will consult thoracic for possible pleurodesis  - scale up the TPN to full dose  - add opium tincture  - close monitoring of ostomy output and balance  - cont with replacement of 1 to 1 NS  - cont w SSI   - off antibiotics  - remains in SICU for high output

## 2019-12-20 NOTE — PROGRESS NOTE ADULT - SUBJECTIVE AND OBJECTIVE BOX
Subjective: Patient seen and examined. No new events except as noted.   remains in ICU   feels ok   no cp or sob   24 HOUR EVENTS:  - Increased Lomotil from 1 tab q6hrs to 2 tabs PO q6hrs and added Imodium 4 mg PO q6hrs  - 4750 mL of ileostomy output over 24 hours  - Changed ostomy output repletions w/ NS from 0.5:1 to 1:1, required additional 500 mL bolus of NS for hypotension w/ SBP in the 90s  - Made NPO due to concern for aspiration      REVIEW OF SYSTEMS:    CONSTITUTIONAL: + weakness, fevers or chills  EYES/ENT: No visual changes;  No vertigo or throat pain   NECK: No pain or stiffness  RESPIRATORY: No cough, wheezing, hemoptysis; No shortness of breath  CARDIOVASCULAR: No chest pain or palpitations  GASTROINTESTINAL: +abdominal or epigastric pain. No nausea, vomiting, or hematemesis; No diarrhea or constipation. No melena or hematochezia.  GENITOURINARY: No dysuria, frequency or hematuria  NEUROLOGICAL: No numbness or weakness  SKIN: No itching, burning, rashes, or lesions   All other review of systems is negative unless indicated above.    MEDICATIONS:  MEDICATIONS  (STANDING):  albuterol/ipratropium for Nebulization 3 milliLiter(s) Nebulizer every 6 hours  buDESOnide    Inhalation Suspension 0.5 milliGRAM(s) Inhalation every 12 hours  chlorhexidine 2% Cloths 1 Application(s) Topical <User Schedule>  diphenoxylate/atropine 2 Tablet(s) Oral every 6 hours  enoxaparin Injectable 40 milliGRAM(s) SubCutaneous daily  fat emulsion (Fish Oil and Plant Based) 20% Infusion 10.3 mL/Hr (10.3 mL/Hr) IV Continuous <Continuous>  insulin lispro (HumaLOG) corrective regimen sliding scale   SubCutaneous every 6 hours  loperamide 4 milliGRAM(s) Oral every 6 hours  metoprolol tartrate 12.5 milliGRAM(s) Oral every 6 hours  opium Tincture 6 milliGRAM(s) Oral four times a day  Parenteral Nutrition - Adult 1 Each (50 mL/Hr) TPN Continuous <Continuous>  sodium chloride 0.9%. 1000 milliLiter(s) (10 mL/Hr) IV Continuous <Continuous>      PHYSICAL EXAM:  T(C): 37.1 (12-20-19 @ 07:00), Max: 37.2 (12-19-19 @ 19:00)  HR: 90 (12-20-19 @ 10:00) (81 - 101)  BP: 84/46 (12-20-19 @ 10:00) (80/48 - 102/52)  RR: 32 (12-20-19 @ 10:00) (18 - 40)  SpO2: 95% (12-20-19 @ 10:00) (91% - 100%)  Wt(kg): --  I&O's Summary    19 Dec 2019 07:01  -  20 Dec 2019 07:00  --------------------------------------------------------  IN: 5969.2 mL / OUT: 5820 mL / NET: 149.2 mL        Appearance: Lethargic   HEENT:   Dry oral mucosa RIJ line   Lymphatic: No lymphadenopathy   Cardiovascular: Normal S1 S2, No JVD, No murmurs , Peripheral pulses palpable 2+ bilaterally  Respiratory: Decreased bs   Gastrointestinal:  Soft, NT, ND. Ostomy pink with output. Midline staples in place, midline incision site is c/d/i   Skin: No rashes, No ecchymoses, No cyanosis, warm to touch  Musculoskeletal: Decreased  range of motion and strength  Psychiatry:  mildly sedated   Ext: No edema +PICC line   +rosas   +rectal tube     LABS:    CARDIAC MARKERS:                                9.8    15.17 )-----------( 451      ( 20 Dec 2019 00:57 )             29.4     12-20    133<L>  |  88<L>  |  54<H>  ----------------------------<  115<H>  3.7   |  33<H>  |  0.83    Ca    8.2<L>      20 Dec 2019 01:04  Phos  3.0     12-20  Mg     2.7     12-20    TPro  5.8<L>  /  Alb  2.2<L>  /  TBili  1.1  /  DBili  0.7<H>  /  AST  22  /  ALT  27  /  AlkPhos  107  12-20    proBNP:   Lipid Profile:   HgA1c:   TSH:             TELEMETRY: 	  SR  ECG:  	  RADIOLOGY:   DIAGNOSTIC TESTING:  [ ] Echocardiogram:  [ ]  Catheterization:  [ ] Stress Test:    OTHER:

## 2019-12-20 NOTE — PROGRESS NOTE ADULT - ASSESSMENT
Assessment	  74M s/p ex lap, MIRYAM, closure of enterotomy, abthera vac placement 12/8 early morning. RTOR for exlap/washout 12/9. s/p RTOR ex lap, resection of 150cm bowel, creation of colostomy. Course c/b spontaneous PTX s/p pigtail 12/15, pleuravac with known air leak.     Plan:  - Diet: LRD  - imodium and lomotil for increased ileostomy output  - Pain medication: IV tylenol  - Monitor HR  - Pigtail to water seal  - DVT ppx: Lovenox  - Activity: OOB  - supportive care per SICU  - TPN per nutrition team    Red Team Surgery  [id number]

## 2019-12-21 DIAGNOSIS — J93.9 PNEUMOTHORAX, UNSPECIFIED: ICD-10-CM

## 2019-12-21 LAB
ALBUMIN SERPL ELPH-MCNC: 2.2 G/DL — LOW (ref 3.3–5)
ALP SERPL-CCNC: 123 U/L — HIGH (ref 40–120)
ALT FLD-CCNC: 27 U/L — SIGNIFICANT CHANGE UP (ref 10–45)
ANION GAP SERPL CALC-SCNC: 8 MMOL/L — SIGNIFICANT CHANGE UP (ref 5–17)
ANION GAP SERPL CALC-SCNC: 8 MMOL/L — SIGNIFICANT CHANGE UP (ref 5–17)
AST SERPL-CCNC: 20 U/L — SIGNIFICANT CHANGE UP (ref 10–40)
BASE EXCESS BLDV CALC-SCNC: 7.6 MMOL/L — HIGH (ref -2–2)
BILIRUB DIRECT SERPL-MCNC: 0.4 MG/DL — HIGH (ref 0–0.2)
BILIRUB INDIRECT FLD-MCNC: 0.4 MG/DL — SIGNIFICANT CHANGE UP (ref 0.2–1)
BILIRUB SERPL-MCNC: 0.8 MG/DL — SIGNIFICANT CHANGE UP (ref 0.2–1.2)
BUN SERPL-MCNC: 24 MG/DL — HIGH (ref 7–23)
BUN SERPL-MCNC: 31 MG/DL — HIGH (ref 7–23)
CA-I SERPL-SCNC: 1.1 MMOL/L — LOW (ref 1.12–1.3)
CALCIUM SERPL-MCNC: 7.8 MG/DL — LOW (ref 8.4–10.5)
CALCIUM SERPL-MCNC: 8.2 MG/DL — LOW (ref 8.4–10.5)
CHLORIDE BLDV-SCNC: 99 MMOL/L — SIGNIFICANT CHANGE UP (ref 96–108)
CHLORIDE SERPL-SCNC: 90 MMOL/L — LOW (ref 96–108)
CHLORIDE SERPL-SCNC: 96 MMOL/L — SIGNIFICANT CHANGE UP (ref 96–108)
CO2 BLDV-SCNC: 34 MMOL/L — HIGH (ref 22–30)
CO2 SERPL-SCNC: 31 MMOL/L — SIGNIFICANT CHANGE UP (ref 22–31)
CO2 SERPL-SCNC: 32 MMOL/L — HIGH (ref 22–31)
CREAT ?TM UR-MCNC: 68 MG/DL — SIGNIFICANT CHANGE UP
CREAT SERPL-MCNC: 0.61 MG/DL — SIGNIFICANT CHANGE UP (ref 0.5–1.3)
CREAT SERPL-MCNC: 0.72 MG/DL — SIGNIFICANT CHANGE UP (ref 0.5–1.3)
GAS PNL BLDV: 134 MMOL/L — LOW (ref 135–145)
GAS PNL BLDV: SIGNIFICANT CHANGE UP
GAS PNL BLDV: SIGNIFICANT CHANGE UP
GLUCOSE BLDC GLUCOMTR-MCNC: 120 MG/DL — HIGH (ref 70–99)
GLUCOSE BLDC GLUCOMTR-MCNC: 129 MG/DL — HIGH (ref 70–99)
GLUCOSE BLDC GLUCOMTR-MCNC: 78 MG/DL — SIGNIFICANT CHANGE UP (ref 70–99)
GLUCOSE BLDC GLUCOMTR-MCNC: 95 MG/DL — SIGNIFICANT CHANGE UP (ref 70–99)
GLUCOSE BLDV-MCNC: 127 MG/DL — HIGH (ref 70–99)
GLUCOSE SERPL-MCNC: 105 MG/DL — HIGH (ref 70–99)
GLUCOSE SERPL-MCNC: 135 MG/DL — HIGH (ref 70–99)
HCO3 BLDV-SCNC: 32 MMOL/L — HIGH (ref 21–29)
HCT VFR BLD CALC: 26.9 % — LOW (ref 39–50)
HCT VFR BLDA CALC: 28 % — LOW (ref 39–50)
HGB BLD CALC-MCNC: 9.2 G/DL — LOW (ref 13–17)
HGB BLD-MCNC: 8.9 G/DL — LOW (ref 13–17)
HOROWITZ INDEX BLDV+IHG-RTO: 21 — SIGNIFICANT CHANGE UP
LACTATE BLDV-MCNC: 0.7 MMOL/L — SIGNIFICANT CHANGE UP (ref 0.7–2)
MAGNESIUM SERPL-MCNC: 2.2 MG/DL — SIGNIFICANT CHANGE UP (ref 1.6–2.6)
MAGNESIUM SERPL-MCNC: 2.4 MG/DL — SIGNIFICANT CHANGE UP (ref 1.6–2.6)
MCHC RBC-ENTMCNC: 30.8 PG — SIGNIFICANT CHANGE UP (ref 27–34)
MCHC RBC-ENTMCNC: 33.1 GM/DL — SIGNIFICANT CHANGE UP (ref 32–36)
MCV RBC AUTO: 93.1 FL — SIGNIFICANT CHANGE UP (ref 80–100)
NRBC # BLD: 0 /100 WBCS — SIGNIFICANT CHANGE UP (ref 0–0)
OSMOLALITY UR: 647 MOS/KG — SIGNIFICANT CHANGE UP (ref 300–900)
OTHER CELLS CSF MANUAL: 10 ML/DL — LOW (ref 18–22)
PCO2 BLDV: 49 MMHG — SIGNIFICANT CHANGE UP (ref 35–50)
PH BLDV: 7.43 — SIGNIFICANT CHANGE UP (ref 7.35–7.45)
PHOSPHATE SERPL-MCNC: 3.2 MG/DL — SIGNIFICANT CHANGE UP (ref 2.5–4.5)
PHOSPHATE SERPL-MCNC: 3.3 MG/DL — SIGNIFICANT CHANGE UP (ref 2.5–4.5)
PLATELET # BLD AUTO: 417 K/UL — HIGH (ref 150–400)
PO2 BLDV: 46 MMHG — HIGH (ref 25–45)
POTASSIUM BLDV-SCNC: 4 MMOL/L — SIGNIFICANT CHANGE UP (ref 3.5–5.3)
POTASSIUM SERPL-MCNC: 3.8 MMOL/L — SIGNIFICANT CHANGE UP (ref 3.5–5.3)
POTASSIUM SERPL-MCNC: 4.2 MMOL/L — SIGNIFICANT CHANGE UP (ref 3.5–5.3)
POTASSIUM SERPL-SCNC: 3.8 MMOL/L — SIGNIFICANT CHANGE UP (ref 3.5–5.3)
POTASSIUM SERPL-SCNC: 4.2 MMOL/L — SIGNIFICANT CHANGE UP (ref 3.5–5.3)
PROT SERPL-MCNC: 5.7 G/DL — LOW (ref 6–8.3)
RBC # BLD: 2.89 M/UL — LOW (ref 4.2–5.8)
RBC # FLD: 13.4 % — SIGNIFICANT CHANGE UP (ref 10.3–14.5)
SAO2 % BLDV: 75 % — SIGNIFICANT CHANGE UP (ref 67–88)
SODIUM SERPL-SCNC: 130 MMOL/L — LOW (ref 135–145)
SODIUM SERPL-SCNC: 135 MMOL/L — SIGNIFICANT CHANGE UP (ref 135–145)
SODIUM UR-SCNC: 57 MMOL/L — SIGNIFICANT CHANGE UP
WBC # BLD: 9.97 K/UL — SIGNIFICANT CHANGE UP (ref 3.8–10.5)
WBC # FLD AUTO: 9.97 K/UL — SIGNIFICANT CHANGE UP (ref 3.8–10.5)

## 2019-12-21 PROCEDURE — 71045 X-RAY EXAM CHEST 1 VIEW: CPT | Mod: 26

## 2019-12-21 PROCEDURE — 71250 CT THORAX DX C-: CPT | Mod: 26

## 2019-12-21 PROCEDURE — 99232 SBSQ HOSP IP/OBS MODERATE 35: CPT

## 2019-12-21 PROCEDURE — 99233 SBSQ HOSP IP/OBS HIGH 50: CPT | Mod: GC

## 2019-12-21 RX ORDER — MORPHINE 10 MG/ML
6 SOLUTION ORAL
Refills: 0 | Status: DISCONTINUED | OUTPATIENT
Start: 2019-12-21 | End: 2019-12-25

## 2019-12-21 RX ORDER — I.V. FAT EMULSION 20 G/100ML
20.8 EMULSION INTRAVENOUS
Qty: 50 | Refills: 0 | Status: DISCONTINUED | OUTPATIENT
Start: 2019-12-21 | End: 2019-12-22

## 2019-12-21 RX ORDER — ELECTROLYTE SOLUTION,INJ
1 VIAL (ML) INTRAVENOUS
Refills: 0 | Status: DISCONTINUED | OUTPATIENT
Start: 2019-12-21 | End: 2019-12-21

## 2019-12-21 RX ORDER — POTASSIUM CHLORIDE 20 MEQ
20 PACKET (EA) ORAL ONCE
Refills: 0 | Status: COMPLETED | OUTPATIENT
Start: 2019-12-21 | End: 2019-12-21

## 2019-12-21 RX ORDER — LOPERAMIDE HCL 2 MG
4 TABLET ORAL
Refills: 0 | Status: DISCONTINUED | OUTPATIENT
Start: 2019-12-21 | End: 2019-12-21

## 2019-12-21 RX ORDER — LOPERAMIDE HCL 2 MG
8 TABLET ORAL
Refills: 0 | Status: DISCONTINUED | OUTPATIENT
Start: 2019-12-21 | End: 2019-12-22

## 2019-12-21 RX ORDER — SODIUM CHLORIDE 9 MG/ML
2 INJECTION INTRAMUSCULAR; INTRAVENOUS; SUBCUTANEOUS EVERY 12 HOURS
Refills: 0 | Status: DISCONTINUED | OUTPATIENT
Start: 2019-12-21 | End: 2019-12-22

## 2019-12-21 RX ORDER — DIPHENOXYLATE HCL/ATROPINE 2.5-.025MG
2 TABLET ORAL
Refills: 0 | Status: DISCONTINUED | OUTPATIENT
Start: 2019-12-21 | End: 2019-12-25

## 2019-12-21 RX ORDER — DEXTROSE 50 % IN WATER 50 %
25 SYRINGE (ML) INTRAVENOUS ONCE
Refills: 0 | Status: COMPLETED | OUTPATIENT
Start: 2019-12-21 | End: 2019-12-21

## 2019-12-21 RX ADMIN — Medication 2 TABLET(S): at 18:04

## 2019-12-21 RX ADMIN — Medication 3 MILLILITER(S): at 05:14

## 2019-12-21 RX ADMIN — MORPHINE 6 MILLIGRAM(S): 10 SOLUTION ORAL at 12:38

## 2019-12-21 RX ADMIN — MORPHINE 6 MILLIGRAM(S): 10 SOLUTION ORAL at 21:40

## 2019-12-21 RX ADMIN — Medication 12.5 MILLIGRAM(S): at 18:04

## 2019-12-21 RX ADMIN — Medication 2 TABLET(S): at 08:17

## 2019-12-21 RX ADMIN — Medication 0.5 MILLIGRAM(S): at 17:06

## 2019-12-21 RX ADMIN — Medication 25 MILLILITER(S): at 06:10

## 2019-12-21 RX ADMIN — Medication 4 MILLIGRAM(S): at 12:43

## 2019-12-21 RX ADMIN — Medication 2 TABLET(S): at 21:40

## 2019-12-21 RX ADMIN — Medication 2 TABLET(S): at 12:43

## 2019-12-21 RX ADMIN — Medication 3 MILLILITER(S): at 11:26

## 2019-12-21 RX ADMIN — SODIUM CHLORIDE 2 GRAM(S): 9 INJECTION INTRAMUSCULAR; INTRAVENOUS; SUBCUTANEOUS at 18:05

## 2019-12-21 RX ADMIN — SODIUM CHLORIDE 2 GRAM(S): 9 INJECTION INTRAMUSCULAR; INTRAVENOUS; SUBCUTANEOUS at 12:38

## 2019-12-21 RX ADMIN — ENOXAPARIN SODIUM 40 MILLIGRAM(S): 100 INJECTION SUBCUTANEOUS at 12:38

## 2019-12-21 RX ADMIN — SODIUM CHLORIDE 10 MILLILITER(S): 9 INJECTION INTRAMUSCULAR; INTRAVENOUS; SUBCUTANEOUS at 06:10

## 2019-12-21 RX ADMIN — Medication 0.5 MILLIGRAM(S): at 05:14

## 2019-12-21 RX ADMIN — Medication 12.5 MILLIGRAM(S): at 12:38

## 2019-12-21 RX ADMIN — Medication 50 MILLIEQUIVALENT(S): at 03:15

## 2019-12-21 RX ADMIN — Medication 8 MILLIGRAM(S): at 22:57

## 2019-12-21 RX ADMIN — MORPHINE 6 MILLIGRAM(S): 10 SOLUTION ORAL at 08:17

## 2019-12-21 RX ADMIN — Medication 4 MILLIGRAM(S): at 08:17

## 2019-12-21 RX ADMIN — Medication 3 MILLILITER(S): at 17:06

## 2019-12-21 RX ADMIN — MORPHINE 6 MILLIGRAM(S): 10 SOLUTION ORAL at 18:05

## 2019-12-21 RX ADMIN — Medication 4 MILLIGRAM(S): at 18:04

## 2019-12-21 NOTE — SWALLOW BEDSIDE ASSESSMENT ADULT - SWALLOW EVAL: RECOMMENDED FEEDING/EATING TECHNIQUES
maintain upright posture during/after eating for 30 mins/oral hygiene/small sips/bites/EAT SLOWLY/crush medication (when feasible)/no straws/alternate food with liquid/position upright (90 degrees)

## 2019-12-21 NOTE — PROGRESS NOTE ADULT - SUBJECTIVE AND OBJECTIVE BOX
[facility] NUTRITION SUPPORT / TPN -- FOLLOW UP NOTE  --------------------------------------------------------------------------------    24 hour events/subjective:  Hyponatremic overnight.  Hypoglycemic to 78 - given amp of D50.  Tincture of opium added.  L pigtail placed back to suction.  Pt tolerating some of his meals (dysphagia diet) and remains on full dose TPN.    Diet:  Diet, Dysphagia 2 Mechanical Soft-Nectar Consistency Fluid (12-20-19 @ 06:05)      PAST HISTORY  --------------------------------------------------------------------------------  No significant changes to PMH, PSH, FHx, SHx, unless otherwise noted    ALLERGIES & MEDICATIONS  --------------------------------------------------------------------------------  Allergies    No Known Allergies    Intolerances      Standing Inpatient Medications  albuterol/ipratropium for Nebulization 3 milliLiter(s) Nebulizer every 6 hours  buDESOnide    Inhalation Suspension 0.5 milliGRAM(s) Inhalation every 12 hours  chlorhexidine 2% Cloths 1 Application(s) Topical <User Schedule>  diphenoxylate/atropine 2 Tablet(s) Oral <User Schedule>  enoxaparin Injectable 40 milliGRAM(s) SubCutaneous daily  fat emulsion (Fish Oil and Plant Based) 20% Infusion 20.8 mL/Hr IV Continuous <Continuous>  insulin lispro (HumaLOG) corrective regimen sliding scale   SubCutaneous every 6 hours  loperamide 4 milliGRAM(s) Oral <User Schedule>  metoprolol tartrate 12.5 milliGRAM(s) Oral every 6 hours  opium Tincture 6 milliGRAM(s) Oral <User Schedule>  Parenteral Nutrition - Adult 1 Each TPN Continuous <Continuous>  Parenteral Nutrition - Adult 1 Each TPN Continuous <Continuous>  sodium chloride 2 Gram(s) Oral every 12 hours  sodium chloride 0.9%. 1000 milliLiter(s) IV Continuous <Continuous>    PRN Inpatient Medications  acetaminophen  IVPB .. 750 milliGRAM(s) IV Intermittent once PRN  ondansetron Injectable 4 milliGRAM(s) IV Push every 6 hours PRN      VITALS/PHYSICAL EXAM  --------------------------------------------------------------------------------  T(C): 36.7 (12-21-19 @ 11:00), Max: 37.4 (12-20-19 @ 23:00)  HR: 86 (12-21-19 @ 11:27) (77 - 97)  BP: 104/51 (12-21-19 @ 11:00) (76/44 - 131/60)  RR: 23 (12-21-19 @ 11:00) (19 - 42)  SpO2: 99% (12-21-19 @ 11:27) (91% - 100%)  Wt(kg): --        12-20-19 @ 07:01  -  12-21-19 @ 07:00  --------------------------------------------------------  IN: 6791.2 mL / OUT: 6540 mL / NET: 251.2 mL      Physical Exam:    Gen:  A&Ox3, sitting in bed eating breakfast           HEENT: NC/AT, PERRL, supple neck, clear oropharynx, mucosa moist  	Pulm: decreased BS B/L, Lt sided pigtail to suction  	Abd: (+) BS, softly distended, non tender,                    midline incision c/d/i                    ostomy with gas and stool, connected to rosas bag                   LLQ JOVANNA w/ serosanguinous drainage                 PICC dressing c/d/I   	Skin: Warm, without rashes, good turgor        LABS/STUDIES  --------------------------------------------------------------------------------              8.9    9.97  >-----------<  417      [12-21-19 @ 01:00]              26.9     130  |  90  |  31  ----------------------------<  105      [12-21-19 @ 01:00]  3.8   |  32  |  0.72        Ca     8.2     [12-21-19 @ 01:00]      Mg     2.4     [12-21-19 @ 01:00]      Phos  3.2     [12-21-19 @ 01:00]    TPro  5.7  /  Alb  2.2  /  TBili  0.8  /  DBili  0.4  /  AST  20  /  ALT  27  /  AlkPhos  123  [12-21-19 @ 01:00]          Ca ionized  Creatinine Trend:  POC glucoseGlucose, Serum: 105 mg/dL (12-21-19 @ 01:00)  Glucose, Serum: 137 mg/dL (12-20-19 @ 20:57)  CAPILLARY BLOOD GLUCOSE      POCT Blood Glucose.: 95 mg/dL (21 Dec 2019 06:35)  POCT Blood Glucose.: 78 mg/dL (21 Dec 2019 05:51)  POCT Blood Glucose.: 95 mg/dL (20 Dec 2019 19:02)  POCT Blood Glucose.: 106 mg/dL (20 Dec 2019 13:04)    PrealbuminPrealbumin, Serum: 15 mg/dL (12-20-19 @ 02:49)  Prealbumin, Serum: 5 mg/dL (12-13-19 @ 07:46)    Triglycerides

## 2019-12-21 NOTE — PROGRESS NOTE ADULT - ASSESSMENT
75 y/o M presenting with septic shock and high grade SBO s/p exploratory laparotomy, lysis of adhesions, decompression of bowel via enterotomy w/ primary repair, and Abthera VAC placement on 12/6; s/p take down of Abthera, washout and re-application of the Abthera vac on 12/8. Now s/p SBR and end ileostomy on 12/10 acute respiratory distress now improving, Pneumothorax, hyperglycemia, delirium.    PLAN:    Neuro: A&Ox3   - off sedation and standing pain meds    Resp: acute respiratory failure upon initial presentation, COPD at baseline, spontaneous left sided pneumothorax s/p pig-tail catheter placement.   - Continue to monitor on RA  - Continue Duonebs, budesonide  - Left sided pigtail to suction, thoracic consult today for possible pleurodesis    CV: septic shock requiring vasopressor support, now hemodynamically stable   - metoprolol 12.5 q6 hrs    GI: SBO s/p exploratory laparotomy, lysis of adhesions, decompression of bowel via enterotomy w/ primary repair, and Abthera VAC placement and removal with 150 cm of small bowel removed and end ileostomy.   - Regular diet w/ Ensure and TPN (calorie count)  - Monitor ostomy output, 1 : 1 repletions, lomotil 2 tabs q6hrs & Imodium 4 mg q6hrs & tincture of opium 6 mg q6hrs    Renal: CHI, resolved   - Replete electrolytes as needed  - Monitor I/Os    Heme: no acute issues  - Lovenox for VTE prophylaxis  - SCDs     ID: No active issues   - Trend WBC, monitor fever curve    Endo: no acute issues  - Started on sliding scale with initiation of TPN, q6 fingersticks    [id number]   /    [id number]

## 2019-12-21 NOTE — SWALLOW BEDSIDE ASSESSMENT ADULT - PHARYNGEAL PHASE
palpable hyolaryngeal elevation/excursion; no overt s/s laryngeal penetration/aspiration Multiple swallows/audible swallow, reduced coordination for respiration and deglutition; slight wet vocal quality post trials palpable hyolaryngeal elevation/excursion

## 2019-12-21 NOTE — SWALLOW BEDSIDE ASSESSMENT ADULT - ASR SWALLOW ASPIRATION MONITOR
upper respiratory infection/change of breathing pattern/gurgly voice/cough/fever/throat clearing/Monitor for s/s aspiration/laryngeal penetration. If noted:  D/C p.o. intake, provide non-oral nutrition/hydration/meds, and contact this service @ [phone]/pneumonia

## 2019-12-21 NOTE — PROGRESS NOTE ADULT - ATTENDING COMMENTS
ON:  increased antimotility agents  hypoglycemic briefly, improved after D50  hyponatremia now  5 liter ostomy output, good urine output, even 250 positive  TPN was planned to be full dose but was given half dose yesterday  AM CXR no pneumothorax    - to discuss with pharmacy regarding tincture of opium  - cont dysphagia diet   - full dose TPN today  - salt tabs  - daily CXR  - consult thoracic for possible pleurodesis  - on metop with hold parameters  - Q12hr BMP  - remains in SICU

## 2019-12-21 NOTE — SWALLOW BEDSIDE ASSESSMENT ADULT - SLP PERTINENT HISTORY OF CURRENT PROBLEM
74y Male PMHx of COPD and EtOH dependence who presented 12/6 with abdominal pain, nausea, hematemesis, poor PO intake for ~2-3 days. Labs significant for CHI w/ Cr 2.74 and lactate of 9.4. Also tachycardic to 110s, hypotensive w/ SBP in 70s. He was given 2 units of PRBCs and 2 L of LR in ED due to concern for upper GI bleeding. Imaging revealed high grade SBO in RLQ. Pt was taken to OR emergently for  exploratory laparotomy, lysis of adhesions, decompression of bowel via enterotomy w/ primary repair, Abthera VAC placement. Required vasopressor support, remained intubated, admitted to SICU for hemodynamic monitoring. Back to OR 12/8 for take down of Abthera, washout and re-application of Abthera vac. Back to OR 12/10 night for end ileostomy. acute respiratory failure upon initial presentation, now improving, spontaneous left sided Pneumothorax s/p pig-tail catheter placement. hyperglycemia, delirium. septic shock requiring vasopressor support, now hemodynamically stable. CHI, resolved.

## 2019-12-21 NOTE — SWALLOW BEDSIDE ASSESSMENT ADULT - SWALLOW EVAL: DIAGNOSIS
Pt presents with evidence of 1) oropharyngeal dysphagia notable for impaired oral management of chewables, reduced coordination of respiration for deglutition, and s/s laryngeal penetration/aspiration with thin liquids, 2) dysphonia c/w recent prolonged intubation.

## 2019-12-21 NOTE — CONSULT NOTE ADULT - ASSESSMENT
75 y/o M presenting with septic shock and high grade SBO s/p exploratory laparotomy, lysis of adhesions, decompression of bowel via enterotomy w/ primary repair, and Abthera VAC placement on 12/6; s/p take down of Abthera, washout and re-application of the Abthera vac on 12/8. Now s/p SBR and end ileostomy on 12/10 acute respiratory distress now improving, Pneumothorax, hyperglycemia, delirium. Now still with persistent pneumothorax when chest pigtail clamped    - Please obtain non-con CT chest to evaluate lung parenchyma  - Dr. Marte to evaluate pt tomorrow with further recs    DAIANA Myers, PGY-2  Thoracic Surgery  Call [phone] with questions

## 2019-12-21 NOTE — PROGRESS NOTE ADULT - SUBJECTIVE AND OBJECTIVE BOX
Subjective: Patient seen and examined. No new events except as noted.   Remains in ICU   resting comfortably   no cp or sob   still with persistent pneumothorax when chest pigtail clamped          REVIEW OF SYSTEMS:    CONSTITUTIONAL:+ weakness, fevers or chills  EYES/ENT: No visual changes;  No vertigo or throat pain   NECK: No pain or stiffness  RESPIRATORY: No cough, wheezing, hemoptysis; No shortness of breath  CARDIOVASCULAR: No chest pain or palpitations  GASTROINTESTINAL: No abdominal or epigastric pain. No nausea, vomiting, or hematemesis; No diarrhea or constipation. No melena or hematochezia.  GENITOURINARY: No dysuria, frequency or hematuria  NEUROLOGICAL: No numbness or weakness  SKIN: No itching, burning, rashes, or lesions   All other review of systems is negative unless indicated above.    MEDICATIONS:  MEDICATIONS  (STANDING):  albuterol/ipratropium for Nebulization 3 milliLiter(s) Nebulizer every 6 hours  buDESOnide    Inhalation Suspension 0.5 milliGRAM(s) Inhalation every 12 hours  chlorhexidine 2% Cloths 1 Application(s) Topical <User Schedule>  diphenoxylate/atropine 2 Tablet(s) Oral <User Schedule>  enoxaparin Injectable 40 milliGRAM(s) SubCutaneous daily  fat emulsion (Fish Oil and Plant Based) 20% Infusion 20.8 mL/Hr (20.8 mL/Hr) IV Continuous <Continuous>  insulin lispro (HumaLOG) corrective regimen sliding scale   SubCutaneous every 6 hours  loperamide 8 milliGRAM(s) Oral <User Schedule>  metoprolol tartrate 12.5 milliGRAM(s) Oral every 6 hours  opium Tincture 6 milliGRAM(s) Oral <User Schedule>  Parenteral Nutrition - Adult 1 Each (100 mL/Hr) TPN Continuous <Continuous>  sodium chloride 2 Gram(s) Oral every 12 hours  sodium chloride 0.9%. 1000 milliLiter(s) (10 mL/Hr) IV Continuous <Continuous>      PHYSICAL EXAM:  T(C): 37.1 (12-21-19 @ 19:00), Max: 37.4 (12-20-19 @ 23:00)  HR: 97 (12-21-19 @ 19:00) (77 - 103)  BP: 89/53 (12-21-19 @ 19:00) (76/44 - 116/56)  RR: 22 (12-21-19 @ 19:00) (19 - 36)  SpO2: 98% (12-21-19 @ 19:00) (95% - 100%)  Wt(kg): --  I&O's Summary    20 Dec 2019 07:01  -  21 Dec 2019 07:00  --------------------------------------------------------  IN: 6791.2 mL / OUT: 6540 mL / NET: 251.2 mL    21 Dec 2019 07:01  -  21 Dec 2019 19:31  --------------------------------------------------------  IN: 3962.4 mL / OUT: 3050 mL / NET: 912.4 mL            Appearance: NAD   HEENT:   Dry oral mucosa RIJ line   Lymphatic: No lymphadenopathy   Cardiovascular: Normal S1 S2, No JVD, No murmurs , Peripheral pulses palpable 2+ bilaterally  Respiratory: Decreased bs   Gastrointestinal:  Soft, NT, ND. Ostomy pink with output. Midline staples in place, midline incision site is c/d/i   Skin: No rashes, No ecchymoses, No cyanosis, warm to touch  Musculoskeletal: Decreased  range of motion and strength  Psychiatry:  mildly sedated   Ext: No edema +PICC line   +rosas   +rectal tube       LABS:    CARDIAC MARKERS:                                8.9    9.97  )-----------( 417      ( 21 Dec 2019 01:00 )             26.9     12-21    135  |  96  |  24<H>  ----------------------------<  135<H>  4.2   |  31  |  0.61    Ca    7.8<L>      21 Dec 2019 13:44  Phos  3.3     12-21  Mg     2.2     12-21    TPro  5.7<L>  /  Alb  2.2<L>  /  TBili  0.8  /  DBili  0.4<H>  /  AST  20  /  ALT  27  /  AlkPhos  123<H>  12-21    proBNP:   Lipid Profile:   HgA1c:   TSH:             TELEMETRY: SR	    ECG:  	  RADIOLOGY:   DIAGNOSTIC TESTING:  [ ] Echocardiogram:  [ ]  Catheterization:  [ ] Stress Test:    OTHER:

## 2019-12-21 NOTE — PROGRESS NOTE ADULT - ASSESSMENT
Assessment	  74M s/p ex lap, MIRYAM, closure of enterotomy, abthera vac placement 12/8 early morning. RTOR for exlap/washout 12/9. s/p RTOR ex lap, resection of 150cm bowel, creation of colostomy. Course c/b spontaneous PTX s/p pigtail 12/15, pleuravac with known air leak.     Plan:  - Diet: LRD  - imodium and lomotil and  tincture for increased ileostomy output  - Pain medication: IV tylenol  - Monitor HR  - Pigtail to suction  - DVT ppx: Lovenox  - Activity: OOB  - supportive care per SICU  - TPN per nutrition team     Red Team Surgery  [id number]

## 2019-12-21 NOTE — PROGRESS NOTE ADULT - SUBJECTIVE AND OBJECTIVE BOX
HISTORY:  74y Male past medical history of COPD and EtOH dependence who presented 12/6 with abdominal pain, nausea, hematemesis, and poor PO intake for ~2-3 days. Labs significant for an CHI w/ Cr 2.74 and lactate of 9.4. He was also notably tachycardic to the 110s and hypotensive w/ SBP in the 70s. He was given 2 units of PRBCs and 2 L of LR in the ED due to concern for upper GI bleeding. Imaging revealed high grade SBO in the RLQ. Patient was taken to the OR emergently for an exploratory laparotomy, lysis of adhesions, decompression of bowel via enterotomy w/ primary repair, and Abthera VAC placement. Of note, the distal 50% of the bowel appeared dusky but viable. He required vasopressor support with phenylephrine and vasopressin infusions. He received 3000 mL of crystalloid w/ EBL of 10 mL and UOP of 25 mL. Patient was left intubated at the end of the case so SICU was consulted for hemodynamic monitoring. Taken back to the OR on 12/8 and underwent take down of Abthera, washout and re-application of the Abthera vac. Went back to OR on 12/10 night for colectomy.    24 HOUR EVENTS:  - Added tincture of opium 6 mg q6hrs  - Clamped left pigtail catheter and left pneumothorax enlarged so pigtail placed back to suction, follow-up CXR w/ no pneumothorax  - Hyponatremic overnight to 130  - Hypoglycemic to 78 so given 1/2 amp of D50  -     SUBJECTIVE/ROS:  [x] A ten-point review of systems was otherwise negative except as noted.  [ ] Due to altered mental status/intubation, subjective information were not able to be obtained from the patient. History was obtained, to the extent possible, from review of the chart and collateral sources of information.    NEURO  Exam: awake, alert, oriented x4, no acute distress, no focal deficits  Meds: acetaminophen  IVPB .. 750 milliGRAM(s) IV Intermittent once PRN Mild Pain (1 - 3)  [x] Adequacy of sedation and pain control has been assessed and adjusted    RESPIRATORY  RR: 25 (12-21-19 @ 07:00) (18 - 42)  SpO2: 99% (12-21-19 @ 07:00) (91% - 100%)  Exam: clear to auscultation bilaterally  Mechanical Ventilation: no  [N/A] Extubation Readiness Assessed  Meds:  - albuterol/ipratropium for Nebulization 3 milliLiter(s) Nebulizer every 6 hours  - buDESOnide    Inhalation Suspension 0.5 milliGRAM(s) Inhalation every 12 hours    CARDIOVASCULAR  HR: 83 (12-21-19 @ 07:00) (77 - 97)  BP: 103/50 (12-21-19 @ 07:00) (80/48 - 131/60)  BP(mean): 72 (12-21-19 @ 07:00) (59 - 87)  Exam: regular rate and rhythm, S1S2  Cardiac Rhythm: sinus  Perfusion    [x]Adequate    [ ]Inadequate  Mentation   [x]Normal       [ ]Reduced  Extremities  [x]Warm         [ ]Cool  Volume Status [ ]Hypervolemic [x]Euvolemic [ ]Hypovolemic  Meds: metoprolol tartrate 12.5 milliGRAM(s) Oral every 6 hours    GI/NUTRITION  Exam: soft, nondistended, mild sawyer-incisional tenderness, ileostomy pink & viable with bilious output  Diet: dysphagia 2  Meds:  - diphenoxylate/atropine 2 Tablet(s) Oral every 6 hours  - fat emulsion (Fish Oil and Plant Based) 20% Infusion 10.3 mL/Hr IV Continuous <Continuous>  - loperamide 4 milliGRAM(s) Oral every 6 hours  - ondansetron Injectable 4 milliGRAM(s) IV Push every 6 hours PRN Nausea and/or Vomiting  - opium Tincture 6 milliGRAM(s) Oral <User Schedule>  - Parenteral Nutrition - Adult 1 Each TPN Continuous <Continuous>    GENITOURINARY  I&O's Detail    12-20 @ 07:01  -  12-21 @ 07:00  --------------------------------------------------------  IN:    fat emulsion (Fish Oil and Plant Based) 20% Infusion: 291.2 mL    IV PiggyBack: 100 mL    sodium chloride 0.9%.: 5200 mL    TPN (Total Parenteral Nutrition): 1200 mL  Total IN: 6791.2 mL    OUT:    Chest Tube: 40 mL    Ileostomy: 5000 mL    Incontinent per Condom Catheter: 1500 mL  Total OUT: 6540 mL    Total NET: 251.2 mL    130<L>  |  90<L>  |  31<H>  ----------------------------<  105<H>  3.8   |  32<H>  |  0.72    Ca    8.2<L>      21 Dec 2019 01:00  Phos  3.2  Mg     2.4  TPro  5.7<L>  /  Alb  2.2<L>  /  TBili  0.8  /  DBili  0.4<H>  /  AST  20  /  ALT  27  /  AlkPhos  123<H>    [ ] Martinez catheter, indication: N/A  Meds: sodium chloride 0.9% for 1:1 repletions of ileostomy output    HEMATOLOGIC  Meds: enoxaparin Injectable 40 milliGRAM(s) SubCutaneous daily  [x] VTE Prophylaxis                        8.9    9.97  )-----------( 417      ( 21 Dec 2019 01:00 )             26.9     INFECTIOUS DISEASES  T(C): 37.2 (12-21-19 @ 03:00), Max: 37.4 (12-20-19 @ 23:00)  WBC Count: 9.97 K/uL (12-21 @ 01:00)  Recent Cultures: none  Meds: none    ENDOCRINE  Capillary Blood Glucose:  - 95 mg/dL (21 Dec 2019 06:35)  - 78 mg/dL (21 Dec 2019 05:51)  - 95 mg/dL (20 Dec 2019 19:02)  - 106 mg/dL (20 Dec 2019 13:04)  Meds: insulin lispro (HumaLOG) corrective regimen sliding scale   SubCutaneous every 6 hours    ACCESS DEVICES:  [x] Peripheral IV  [ ] Central Venous Line	[ ] R	[ ] L	[ ] IJ	[ ] Fem	[ ] SC	Placed:   [ ] Arterial Line		[ ] R	[ ] L	[ ] Fem	[ ] Rad	[ ] Ax	Placed:   [x] PICC:	[place] PICC placed on 12/14			[ ] Mediport  [ ] Urinary Catheter, Date Placed:   [x] Necessity of urinary, arterial, and venous catheters discussed    OTHER MEDICATIONS: chlorhexidine 2% Cloths 1 Application(s) Topical <User Schedule>    CODE STATUS: Full code    IMAGING:

## 2019-12-21 NOTE — SWALLOW BEDSIDE ASSESSMENT ADULT - SLP GENERAL OBSERVATIONS
Pt seen at bedside, awake and alert, cooperative, verbally responsive, oriented. Pt following directions for the purposes of the exam. Pt with known h/o COPD with fluctuating RR of 20s up to low 30s, which he states feels close to his baseline.

## 2019-12-21 NOTE — SWALLOW BEDSIDE ASSESSMENT ADULT - COMMENTS
Nutrition consulted for TPN to assist w/ management of pt's nutrition in pt w/ prolonged hospital course now tolerating diet but has high Ileostomy output Pt with more labored breathing during chewing, reports, that is fatiguing and more difficult to coordinate breathing when chewing.

## 2019-12-21 NOTE — PROGRESS NOTE ADULT - SUBJECTIVE AND OBJECTIVE BOX
Surgery Progress Note  Patient is a 74y old  Male who presents with a chief complaint of abd. pain (21 Dec 2019 07:30)      SUBJECTIVE: Patient seen and examined at bedside with surgical team.   24 HOUR EVENTS:  - Added tincture of opium 6 mg q6hrs  - Clamped left pigtail catheter and left pneumothorax enlarged so pigtail placed back to suction, follow-up CXR w/ no pneumothorax  - Hyponatremic overnight to 130  - Hypoglycemic to 78 so given 1/2 amp of D50    Vital Signs Last 24 Hrs  T(C): 36.7 (21 Dec 2019 11:00), Max: 37.4 (20 Dec 2019 23:00)  T(F): 98.1 (21 Dec 2019 11:00), Max: 99.3 (20 Dec 2019 23:00)  HR: 84 (21 Dec 2019 11:00) (77 - 97)  BP: 104/51 (21 Dec 2019 11:00) (76/44 - 131/60)  BP(mean): 74 (21 Dec 2019 11:00) (55 - 87)  RR: 23 (21 Dec 2019 11:00) (19 - 42)  SpO2: 98% (21 Dec 2019 11:00) (91% - 100%)    Physical Exam  Constitutional: NAD  Respiratory: breathing comfortably on RA  Abd:  soft, nondistended, mild sawyer-incisional tenderness, ileostomy pink & viable with bilious  Ext: moving all 4 ext spontaneously     I&O's Detail    20 Dec 2019 07:01  -  21 Dec 2019 07:00  --------------------------------------------------------  IN:    fat emulsion (Fish Oil and Plant Based) 20% Infusion: 291.2 mL    IV PiggyBack: 100 mL    sodium chloride 0.9%.: 5200 mL    TPN (Total Parenteral Nutrition): 1200 mL  Total IN: 6791.2 mL    OUT:    Chest Tube: 40 mL    Ileostomy: 5000 mL    Incontinent per Condom Catheter: 1500 mL  Total OUT: 6540 mL    Total NET: 251.2 mL      MEDICATIONS  (STANDING):  albuterol/ipratropium for Nebulization 3 milliLiter(s) Nebulizer every 6 hours  buDESOnide    Inhalation Suspension 0.5 milliGRAM(s) Inhalation every 12 hours  chlorhexidine 2% Cloths 1 Application(s) Topical <User Schedule>  diphenoxylate/atropine 2 Tablet(s) Oral <User Schedule>  enoxaparin Injectable 40 milliGRAM(s) SubCutaneous daily  fat emulsion (Fish Oil and Plant Based) 20% Infusion 20.8 mL/Hr (20.8 mL/Hr) IV Continuous <Continuous>  insulin lispro (HumaLOG) corrective regimen sliding scale   SubCutaneous every 6 hours  loperamide 4 milliGRAM(s) Oral <User Schedule>  metoprolol tartrate 12.5 milliGRAM(s) Oral every 6 hours  opium Tincture 6 milliGRAM(s) Oral <User Schedule>  Parenteral Nutrition - Adult 1 Each (100 mL/Hr) TPN Continuous <Continuous>  Parenteral Nutrition - Adult 1 Each (100 mL/Hr) TPN Continuous <Continuous>  sodium chloride 2 Gram(s) Oral every 12 hours  sodium chloride 0.9%. 1000 milliLiter(s) (10 mL/Hr) IV Continuous <Continuous>    MEDICATIONS  (PRN):  acetaminophen  IVPB .. 750 milliGRAM(s) IV Intermittent once PRN Mild Pain (1 - 3)  ondansetron Injectable 4 milliGRAM(s) IV Push every 6 hours PRN Nausea and/or Vomiting      LABS:                        8.9    9.97  )-----------( 417      ( 21 Dec 2019 01:00 )             26.9     12-21    130<L>  |  90<L>  |  31<H>  ----------------------------<  105<H>  3.8   |  32<H>  |  0.72    Ca    8.2<L>      21 Dec 2019 01:00  Phos  3.2     12-21  Mg     2.4     12-21    TPro  5.7<L>  /  Alb  2.2<L>  /  TBili  0.8  /  DBili  0.4<H>  /  AST  20  /  ALT  27  /  AlkPhos  123<H>  12-21      LIVER FUNCTIONS - ( 21 Dec 2019 01:00 )  Alb: 2.2 g/dL / Pro: 5.7 g/dL / ALK PHOS: 123 U/L / ALT: 27 U/L / AST: 20 U/L / GGT: x

## 2019-12-21 NOTE — SWALLOW BEDSIDE ASSESSMENT ADULT - ORAL PHASE
Delayed oral transit time suspect premature spillage Delayed oral transit time/Decreased anterior-posterior movement of the bolus

## 2019-12-21 NOTE — CONSULT NOTE ADULT - SUBJECTIVE AND OBJECTIVE BOX
General Surgery Consult  Consulting surgical team: Thoracic Surgery  Consulting attending: Juan M Marte    HPI:  74y Male past medical history of COPD and EtOH dependence who presented 12/6 with abdominal pain, nausea, hematemesis, and poor PO intake for ~2-3 days. Labs significant for an CHI w/ Cr 2.74 and lactate of 9.4. He was also notably tachycardic to the 110s and hypotensive w/ SBP in the 70s. He was given 2 units of PRBCs and 2 L of LR in the ED due to concern for upper GI bleeding. Imaging revealed high grade SBO in the RLQ. Patient was taken to the OR emergently for an exploratory laparotomy, lysis of adhesions, decompression of bowel via enterotomy w/ primary repair, and Abthera VAC placement. Of note, the distal 50% of the bowel appeared dusky but viable. He required vasopressor support with phenylephrine and vasopressin infusions. He received 3000 mL of crystalloid w/ EBL of 10 mL and UOP of 25 mL. Patient was left intubated at the end of the case so SICU was consulted for hemodynamic monitoring. Taken back to the OR on 12/8 and underwent take down of Abthera, washout and re-application of the Abthera vac. Went back to OR on 12/10 night for SBR and partial ascending colon resection with end ileostomy.     12/15 acutely hypoxic, placed on BIPAP. Hours later, CXR showing PTX. Chest pigtail catheter placed  12/18 chest pigtail catheter placed on water seal, slight increase in pneumothorax  12/19 continues on water seal  12/20 chest pigtail clamped, f/u CXR demonstrating pneumothorax, pigtail placed back on suction  12/21 thoracic surgery consulted for further recommendations      PAST MEDICAL HISTORY:  COPD with hypoxia  ETOHism  ETOH abuse      PAST SURGICAL HISTORY:  History of lumbosacral spine surgery  History of prostate surgery  History of appendectomy  S/P appendectomy      MEDICATIONS:  acetaminophen  IVPB .. 750 milliGRAM(s) IV Intermittent once PRN  albuterol/ipratropium for Nebulization 3 milliLiter(s) Nebulizer every 6 hours  buDESOnide    Inhalation Suspension 0.5 milliGRAM(s) Inhalation every 12 hours  chlorhexidine 2% Cloths 1 Application(s) Topical <User Schedule>  diphenoxylate/atropine 2 Tablet(s) Oral <User Schedule>  enoxaparin Injectable 40 milliGRAM(s) SubCutaneous daily  fat emulsion (Fish Oil and Plant Based) 20% Infusion 20.8 mL/Hr IV Continuous <Continuous>  insulin lispro (HumaLOG) corrective regimen sliding scale   SubCutaneous every 6 hours  loperamide 4 milliGRAM(s) Oral <User Schedule>  metoprolol tartrate 12.5 milliGRAM(s) Oral every 6 hours  ondansetron Injectable 4 milliGRAM(s) IV Push every 6 hours PRN  opium Tincture 6 milliGRAM(s) Oral <User Schedule>  Parenteral Nutrition - Adult 1 Each TPN Continuous <Continuous>  Parenteral Nutrition - Adult 1 Each TPN Continuous <Continuous>  sodium chloride 2 Gram(s) Oral every 12 hours  sodium chloride 0.9%. 1000 milliLiter(s) IV Continuous <Continuous>      ALLERGIES:  No Known Allergies      VITALS & I/Os:  Vital Signs Last 24 Hrs  T(C): 36.7 (21 Dec 2019 11:00), Max: 37.4 (20 Dec 2019 23:00)  T(F): 98.1 (21 Dec 2019 11:00), Max: 99.3 (20 Dec 2019 23:00)  HR: 92 (21 Dec 2019 13:00) (77 - 100)  BP: 93/55 (21 Dec 2019 13:00) (76/44 - 110/59)  BP(mean): 70 (21 Dec 2019 13:00) (55 - 80)  RR: 32 (21 Dec 2019 13:00) (19 - 32)  SpO2: 98% (21 Dec 2019 13:00) (95% - 100%)    I&O's Summary    20 Dec 2019 07:01  -  21 Dec 2019 07:00  --------------------------------------------------------  IN: 6791.2 mL / OUT: 6540 mL / NET: 251.2 mL    21 Dec 2019 07:01  -  21 Dec 2019 15:51  --------------------------------------------------------  IN: 1430 mL / OUT: 1000 mL / NET: 430 mL        PHYSICAL EXAM:  General: No acute distress  Respiratory: Nonlabored, chest pigtail in place with air leak  Cardiovascular: RRR  Abdominal: Soft, nondistended, nontender, ileostomy functioning  Extremities: Warm    LABS:                        8.9    9.97  )-----------( 417      ( 21 Dec 2019 01:00 )             26.9     12-21    135  |  96  |  24<H>  ----------------------------<  135<H>  4.2   |  31  |  0.61    Ca    7.8<L>      21 Dec 2019 13:44  Phos  3.3     12-21  Mg     2.2     12-21    TPro  5.7<L>  /  Alb  2.2<L>  /  TBili  0.8  /  DBili  0.4<H>  /  AST  20  /  ALT  27  /  AlkPhos  123<H>  12-21    Lactate:  12-21 @ 13:33  0.7    IMAGING:  CT chest pending

## 2019-12-21 NOTE — PROGRESS NOTE ADULT - ASSESSMENT
A/P: 74 year old male w/ PMH of COPD and EtOH dependence with PSH/o appy, prostate surgery, & spine surgery  septic shock and high grade SBO s/p exploratory laparotomy, lysis of adhesions, decompression of bowel via enterotomy w/ primary repair, and Abthera VAC placement on 12/6; s/p take down of Abthera, washout and re-application of the Abthera vac on 12/8, s/p SBR and end ileostomy on 12/10.   TPN consulted to assist w/ management of pt's nutrition in pt w/ prolonged hospital course now tolerating diet but has high Ileostomy output   -cont TPN at goal:  210g dextrose, 120g AA, 50g lipid in 2400cc total volume  -hyponatremia - salt tabs added by ICU team today.  Cont NaPhos 60 mmol, NaAce 60 mmol, NaCl 40meq  -please obtain urine lytes to help evaluate hyponatremia  -elevated bicarb - please obtain ABG or VBG to help evaluate alkalosis  -hypoglycemia - insulin removed from today's formula.  Please continue to check FS and cover with ISS  -daily BMP, Mg, Phos  -continue to monitor ostomy output and provide anti-motility agents    MARTINA King PA-C   TPN pager [phone], spectra [phone], discussed with Dr. Chacko and Dr. MELISSA Siegel

## 2019-12-22 DIAGNOSIS — J93.9 PNEUMOTHORAX, UNSPECIFIED: ICD-10-CM

## 2019-12-22 LAB
ALBUMIN SERPL ELPH-MCNC: 2 G/DL — LOW (ref 3.3–5)
ALP SERPL-CCNC: 114 U/L — SIGNIFICANT CHANGE UP (ref 40–120)
ALT FLD-CCNC: 27 U/L — SIGNIFICANT CHANGE UP (ref 10–45)
ANION GAP SERPL CALC-SCNC: 6 MMOL/L — SIGNIFICANT CHANGE UP (ref 5–17)
ANION GAP SERPL CALC-SCNC: 8 MMOL/L — SIGNIFICANT CHANGE UP (ref 5–17)
AST SERPL-CCNC: 18 U/L — SIGNIFICANT CHANGE UP (ref 10–40)
BILIRUB DIRECT SERPL-MCNC: 0.3 MG/DL — HIGH (ref 0–0.2)
BILIRUB INDIRECT FLD-MCNC: 0.3 MG/DL — SIGNIFICANT CHANGE UP (ref 0.2–1)
BILIRUB SERPL-MCNC: 0.6 MG/DL — SIGNIFICANT CHANGE UP (ref 0.2–1.2)
BUN SERPL-MCNC: 22 MG/DL — SIGNIFICANT CHANGE UP (ref 7–23)
BUN SERPL-MCNC: 23 MG/DL — SIGNIFICANT CHANGE UP (ref 7–23)
CALCIUM SERPL-MCNC: 7.8 MG/DL — LOW (ref 8.4–10.5)
CALCIUM SERPL-MCNC: 7.9 MG/DL — LOW (ref 8.4–10.5)
CHLORIDE SERPL-SCNC: 102 MMOL/L — SIGNIFICANT CHANGE UP (ref 96–108)
CHLORIDE SERPL-SCNC: 99 MMOL/L — SIGNIFICANT CHANGE UP (ref 96–108)
CO2 SERPL-SCNC: 28 MMOL/L — SIGNIFICANT CHANGE UP (ref 22–31)
CO2 SERPL-SCNC: 29 MMOL/L — SIGNIFICANT CHANGE UP (ref 22–31)
CREAT SERPL-MCNC: 0.52 MG/DL — SIGNIFICANT CHANGE UP (ref 0.5–1.3)
CREAT SERPL-MCNC: 0.58 MG/DL — SIGNIFICANT CHANGE UP (ref 0.5–1.3)
GLUCOSE BLDC GLUCOMTR-MCNC: 116 MG/DL — HIGH (ref 70–99)
GLUCOSE BLDC GLUCOMTR-MCNC: 126 MG/DL — HIGH (ref 70–99)
GLUCOSE BLDC GLUCOMTR-MCNC: 129 MG/DL — HIGH (ref 70–99)
GLUCOSE SERPL-MCNC: 137 MG/DL — HIGH (ref 70–99)
GLUCOSE SERPL-MCNC: 138 MG/DL — HIGH (ref 70–99)
HCT VFR BLD CALC: 24.8 % — LOW (ref 39–50)
HGB BLD-MCNC: 8.1 G/DL — LOW (ref 13–17)
MAGNESIUM SERPL-MCNC: 1.9 MG/DL — SIGNIFICANT CHANGE UP (ref 1.6–2.6)
MAGNESIUM SERPL-MCNC: 2 MG/DL — SIGNIFICANT CHANGE UP (ref 1.6–2.6)
MCHC RBC-ENTMCNC: 30.6 PG — SIGNIFICANT CHANGE UP (ref 27–34)
MCHC RBC-ENTMCNC: 32.7 GM/DL — SIGNIFICANT CHANGE UP (ref 32–36)
MCV RBC AUTO: 93.6 FL — SIGNIFICANT CHANGE UP (ref 80–100)
NRBC # BLD: 0 /100 WBCS — SIGNIFICANT CHANGE UP (ref 0–0)
OSMOLALITY SERPL: 288 MOSMOL/KG — SIGNIFICANT CHANGE UP (ref 280–301)
PHOSPHATE SERPL-MCNC: 3 MG/DL — SIGNIFICANT CHANGE UP (ref 2.5–4.5)
PHOSPHATE SERPL-MCNC: 3.1 MG/DL — SIGNIFICANT CHANGE UP (ref 2.5–4.5)
PLATELET # BLD AUTO: 444 K/UL — HIGH (ref 150–400)
POTASSIUM SERPL-MCNC: 4.4 MMOL/L — SIGNIFICANT CHANGE UP (ref 3.5–5.3)
POTASSIUM SERPL-MCNC: 4.4 MMOL/L — SIGNIFICANT CHANGE UP (ref 3.5–5.3)
POTASSIUM SERPL-SCNC: 4.4 MMOL/L — SIGNIFICANT CHANGE UP (ref 3.5–5.3)
POTASSIUM SERPL-SCNC: 4.4 MMOL/L — SIGNIFICANT CHANGE UP (ref 3.5–5.3)
PROT SERPL-MCNC: 5.4 G/DL — LOW (ref 6–8.3)
RBC # BLD: 2.65 M/UL — LOW (ref 4.2–5.8)
RBC # FLD: 13.4 % — SIGNIFICANT CHANGE UP (ref 10.3–14.5)
SODIUM SERPL-SCNC: 134 MMOL/L — LOW (ref 135–145)
SODIUM SERPL-SCNC: 138 MMOL/L — SIGNIFICANT CHANGE UP (ref 135–145)
WBC # BLD: 9.42 K/UL — SIGNIFICANT CHANGE UP (ref 3.8–10.5)
WBC # FLD AUTO: 9.42 K/UL — SIGNIFICANT CHANGE UP (ref 3.8–10.5)

## 2019-12-22 PROCEDURE — 71045 X-RAY EXAM CHEST 1 VIEW: CPT | Mod: 26,77

## 2019-12-22 PROCEDURE — 99232 SBSQ HOSP IP/OBS MODERATE 35: CPT

## 2019-12-22 PROCEDURE — 71045 X-RAY EXAM CHEST 1 VIEW: CPT | Mod: 26

## 2019-12-22 PROCEDURE — 99233 SBSQ HOSP IP/OBS HIGH 50: CPT | Mod: GC

## 2019-12-22 RX ORDER — MAGNESIUM SULFATE 500 MG/ML
2 VIAL (ML) INJECTION ONCE
Refills: 0 | Status: COMPLETED | OUTPATIENT
Start: 2019-12-22 | End: 2019-12-22

## 2019-12-22 RX ORDER — I.V. FAT EMULSION 20 G/100ML
20.8 EMULSION INTRAVENOUS
Qty: 50 | Refills: 0 | Status: DISCONTINUED | OUTPATIENT
Start: 2019-12-22 | End: 2019-12-23

## 2019-12-22 RX ORDER — ELECTROLYTE SOLUTION,INJ
1 VIAL (ML) INTRAVENOUS
Refills: 0 | Status: DISCONTINUED | OUTPATIENT
Start: 2019-12-22 | End: 2019-12-22

## 2019-12-22 RX ORDER — LOPERAMIDE HCL 2 MG
4 TABLET ORAL
Refills: 0 | Status: DISCONTINUED | OUTPATIENT
Start: 2019-12-22 | End: 2019-12-24

## 2019-12-22 RX ADMIN — Medication 1 EACH: at 17:25

## 2019-12-22 RX ADMIN — Medication 8 MILLIGRAM(S): at 22:28

## 2019-12-22 RX ADMIN — SODIUM CHLORIDE 2 GRAM(S): 9 INJECTION INTRAMUSCULAR; INTRAVENOUS; SUBCUTANEOUS at 17:26

## 2019-12-22 RX ADMIN — SODIUM CHLORIDE 10 MILLILITER(S): 9 INJECTION INTRAMUSCULAR; INTRAVENOUS; SUBCUTANEOUS at 06:16

## 2019-12-22 RX ADMIN — I.V. FAT EMULSION 20.8 ML/HR: 20 EMULSION INTRAVENOUS at 17:25

## 2019-12-22 RX ADMIN — Medication 8 MILLIGRAM(S): at 08:58

## 2019-12-22 RX ADMIN — Medication 8 MILLIGRAM(S): at 12:35

## 2019-12-22 RX ADMIN — MORPHINE 6 MILLIGRAM(S): 10 SOLUTION ORAL at 22:28

## 2019-12-22 RX ADMIN — I.V. FAT EMULSION 20.8 ML/HR: 20 EMULSION INTRAVENOUS at 22:29

## 2019-12-22 RX ADMIN — Medication 0.5 MILLIGRAM(S): at 05:10

## 2019-12-22 RX ADMIN — MORPHINE 6 MILLIGRAM(S): 10 SOLUTION ORAL at 12:34

## 2019-12-22 RX ADMIN — Medication 2 TABLET(S): at 17:26

## 2019-12-22 RX ADMIN — Medication 50 GRAM(S): at 17:25

## 2019-12-22 RX ADMIN — Medication 3 MILLILITER(S): at 00:22

## 2019-12-22 RX ADMIN — Medication 12.5 MILLIGRAM(S): at 06:16

## 2019-12-22 RX ADMIN — Medication 2 TABLET(S): at 22:28

## 2019-12-22 RX ADMIN — MORPHINE 6 MILLIGRAM(S): 10 SOLUTION ORAL at 08:58

## 2019-12-22 RX ADMIN — MORPHINE 6 MILLIGRAM(S): 10 SOLUTION ORAL at 17:25

## 2019-12-22 RX ADMIN — Medication 0.5 MILLIGRAM(S): at 17:29

## 2019-12-22 RX ADMIN — SODIUM CHLORIDE 2 GRAM(S): 9 INJECTION INTRAMUSCULAR; INTRAVENOUS; SUBCUTANEOUS at 06:16

## 2019-12-22 RX ADMIN — Medication 3 MILLILITER(S): at 17:29

## 2019-12-22 RX ADMIN — Medication 3 MILLILITER(S): at 05:10

## 2019-12-22 RX ADMIN — Medication 8 MILLIGRAM(S): at 17:26

## 2019-12-22 RX ADMIN — Medication 2 TABLET(S): at 12:35

## 2019-12-22 RX ADMIN — Medication 12.5 MILLIGRAM(S): at 18:09

## 2019-12-22 RX ADMIN — ENOXAPARIN SODIUM 40 MILLIGRAM(S): 100 INJECTION SUBCUTANEOUS at 12:35

## 2019-12-22 RX ADMIN — SODIUM CHLORIDE 10 MILLILITER(S): 9 INJECTION INTRAMUSCULAR; INTRAVENOUS; SUBCUTANEOUS at 22:28

## 2019-12-22 RX ADMIN — Medication 3 MILLILITER(S): at 23:39

## 2019-12-22 RX ADMIN — Medication 1 EACH: at 22:29

## 2019-12-22 RX ADMIN — Medication 2 TABLET(S): at 08:58

## 2019-12-22 RX ADMIN — CHLORHEXIDINE GLUCONATE 1 APPLICATION(S): 213 SOLUTION TOPICAL at 06:15

## 2019-12-22 NOTE — PROGRESS NOTE ADULT - SUBJECTIVE AND OBJECTIVE BOX
Surgery Progress Note  Patient is a 74y old  Male who presents with a chief complaint of abd. pain (22 Dec 2019 00:30)      SUBJECTIVE: Patient seen and examined at bedside with surgical team.     24 HOUR EVENTS:  - Loperamide dose increased to 8mg. q6 hrs  - CT scan non-non of chest obtained as per thoracic to assess persistent air leak.     Vital Signs Last 24 Hrs  T(C): 37.1 (22 Dec 2019 03:00), Max: 37.3 (21 Dec 2019 23:00)  T(F): 98.8 (22 Dec 2019 03:00), Max: 99.1 (21 Dec 2019 23:00)  HR: 98 (22 Dec 2019 07:00) (83 - 103)  BP: 124/62 (22 Dec 2019 07:00) (87/51 - 139/63)  BP(mean): 87 (22 Dec 2019 07:00) (64 - 90)  RR: 33 (22 Dec 2019 07:00) (18 - 36)  SpO2: 99% (22 Dec 2019 07:00) (96% - 100%)    Physical Exam  Constitutional: NAD  Respiratory: increased WOB noted on RA - accessory neck muscle use   Abd: soft, NT, ND, ostomy is pink with stool in bag   Ext: moving all 4 limbs spontaneously     I&O's Detail    21 Dec 2019 07:01  -  22 Dec 2019 07:00  --------------------------------------------------------  IN:    fat emulsion (Fish Oil and Plant Based) 20% Infusion: 270.4 mL    sodium chloride 0.9%.: 3899 mL    TPN (Total Parenteral Nutrition): 2400 mL  Total IN: 6569.4 mL    OUT:    Ileostomy: 4500 mL    Incontinent per Condom Catheter: 1200 mL  Total OUT: 5700 mL    Total NET: 869.4 mL      MEDICATIONS  (STANDING):  albuterol/ipratropium for Nebulization 3 milliLiter(s) Nebulizer every 6 hours  buDESOnide    Inhalation Suspension 0.5 milliGRAM(s) Inhalation every 12 hours  chlorhexidine 2% Cloths 1 Application(s) Topical <User Schedule>  diphenoxylate/atropine 2 Tablet(s) Oral <User Schedule>  enoxaparin Injectable 40 milliGRAM(s) SubCutaneous daily  fat emulsion (Fish Oil and Plant Based) 20% Infusion 20.8 mL/Hr (20.8 mL/Hr) IV Continuous <Continuous>  insulin lispro (HumaLOG) corrective regimen sliding scale   SubCutaneous every 6 hours  loperamide 8 milliGRAM(s) Oral <User Schedule>  metoprolol tartrate 12.5 milliGRAM(s) Oral every 6 hours  opium Tincture 6 milliGRAM(s) Oral <User Schedule>  Parenteral Nutrition - Adult 1 Each (100 mL/Hr) TPN Continuous <Continuous>  sodium chloride 2 Gram(s) Oral every 12 hours  sodium chloride 0.9%. 1000 milliLiter(s) (10 mL/Hr) IV Continuous <Continuous>    MEDICATIONS  (PRN):  acetaminophen  IVPB .. 750 milliGRAM(s) IV Intermittent once PRN Mild Pain (1 - 3)  ondansetron Injectable 4 milliGRAM(s) IV Push every 6 hours PRN Nausea and/or Vomiting      LABS:                        8.1    9.42  )-----------( 444      ( 22 Dec 2019 00:35 )             24.8     12-22    134<L>  |  99  |  23  ----------------------------<  137<H>  4.4   |  29  |  0.58    Ca    7.8<L>      22 Dec 2019 00:35  Phos  3.1     12-22  Mg     2.0     12-22    TPro  5.4<L>  /  Alb  2.0<L>  /  TBili  0.6  /  DBili  0.3<H>  /  AST  18  /  ALT  27  /  AlkPhos  114  12-22      LIVER FUNCTIONS - ( 22 Dec 2019 00:35 )  Alb: 2.0 g/dL / Pro: 5.4 g/dL / ALK PHOS: 114 U/L / ALT: 27 U/L / AST: 18 U/L / GGT: x

## 2019-12-22 NOTE — PROGRESS NOTE ADULT - ASSESSMENT
Plan   - evaluated by speech and swallow who suggested dysphagia 1 necator diet and fibro-optic swallow study (monday)   - continue to increase imodium and Lomotil until maxed out or ileostomy output is within normal limits  - c/w tincture   - c/w 1:1 ostomy repletions

## 2019-12-22 NOTE — PROGRESS NOTE ADULT - SUBJECTIVE AND OBJECTIVE BOX
Subjective: Pt states" " Denies any CP, SOB, palpitations. No acute events overnight.    Vital Signs:  Vital Signs Last 24 Hrs  T(C): 37.3 (12-22-19 @ 07:00), Max: 37.3 (12-21-19 @ 23:00)  T(F): 99.1 (12-22-19 @ 07:00), Max: 99.1 (12-21-19 @ 23:00)  HR: 99 (12-22-19 @ 11:00) (83 - 103)  BP: 98/50 (12-22-19 @ 11:00) (87/51 - 139/63)  RR: 33 (12-22-19 @ 11:00) (18 - 44)  SpO2: 99% (12-22-19 @ 11:00) (96% - 100%) on (O2)        Relevant labs, radiology and Medications reviewed                        8.1    9.42  )-----------( 444      ( 22 Dec 2019 00:35 )             24.8     12-22    134<L>  |  99  |  23  ----------------------------<  137<H>  4.4   |  29  |  0.58    Ca    7.8<L>      22 Dec 2019 00:35  Phos  3.1     12-22  Mg     2.0     12-22    TPro  5.4<L>  /  Alb  2.0<L>  /  TBili  0.6  /  DBili  0.3<H>  /  AST  18  /  ALT  27  /  AlkPhos  114  12-22      MEDICATIONS  (STANDING):  albuterol/ipratropium for Nebulization 3 milliLiter(s) Nebulizer every 6 hours  buDESOnide    Inhalation Suspension 0.5 milliGRAM(s) Inhalation every 12 hours  chlorhexidine 2% Cloths 1 Application(s) Topical <User Schedule>  diphenoxylate/atropine 2 Tablet(s) Oral <User Schedule>  enoxaparin Injectable 40 milliGRAM(s) SubCutaneous daily  fat emulsion (Fish Oil and Plant Based) 20% Infusion 20.8 mL/Hr (20.8 mL/Hr) IV Continuous <Continuous>  insulin lispro (HumaLOG) corrective regimen sliding scale   SubCutaneous every 6 hours  loperamide 8 milliGRAM(s) Oral <User Schedule>  metoprolol tartrate 12.5 milliGRAM(s) Oral every 6 hours  opium Tincture 6 milliGRAM(s) Oral <User Schedule>  Parenteral Nutrition - Adult 1 Each (100 mL/Hr) TPN Continuous <Continuous>  Parenteral Nutrition - Adult 1 Each (100 mL/Hr) TPN Continuous <Continuous>  sodium chloride 2 Gram(s) Oral every 12 hours  sodium chloride 0.9%. 1000 milliLiter(s) (10 mL/Hr) IV Continuous <Continuous>    MEDICATIONS  (PRN):  acetaminophen  IVPB .. 750 milliGRAM(s) IV Intermittent once PRN Mild Pain (1 - 3)  ondansetron Injectable 4 milliGRAM(s) IV Push every 6 hours PRN Nausea and/or Vomiting      I&O's Summary    21 Dec 2019 07:01  -  22 Dec 2019 07:00  --------------------------------------------------------  IN: 6569.4 mL / OUT: 5700 mL / NET: 869.4 mL        IMAGING: reviewed    CXR:    CT Chest:    PAST MEDICAL & SURGICAL HISTORY:  COPD with hypoxia  ETOHism  ETOH abuse  History of lumbosacral spine surgery  History of prostate surgery  History of appendectomy       Physical Exam:  Neurology: A&Ox3, nonfocal, WILEY x 4, NAD  Respiratory: B/L BS CTA, diminished at bases, No wheezing, rales, rhonchi  CV: RRR, S1S2

## 2019-12-22 NOTE — PROGRESS NOTE ADULT - ASSESSMENT
73 y/o M presenting with septic shock and high grade SBO s/p exploratory laparotomy, lysis of adhesions, decompression of bowel via enterotomy w/ primary repair, and Abthera VAC placement on 12/6; s/p take down of Abthera, washout and re-application of the Abthera vac on 12/8. Now s/p SBR and end ileostomy on 12/10 acute respiratory distress now improving, Pneumothorax, hyperglycemia, delirium. Now still with persistent pneumothorax when chest pigtail clamped.

## 2019-12-22 NOTE — PROGRESS NOTE ADULT - SUBJECTIVE AND OBJECTIVE BOX
HISTORY  74y Male COPD and EtOH dependence who presented 12/6 with abdominal pain, nausea, hematemesis, and poor PO intake for ~2-3 days. Labs significant for an CHI w/ Cr 2.74 and lactate of 9.4. He was also notably tachycardic to the 110s and hypotensive w/ SBP in the 70s. He was given 2 units of PRBCs and 2 L of LR in the ED due to concern for upper GI bleeding. Imaging revealed high grade SBO in the RLQ. Patient was taken to the OR emergently for an exploratory laparotomy, lysis of adhesions, decompression of bowel via enterotomy w/ primary repair, and Abthera VAC placement. Of note, the distal 50% of the bowel appeared dusky but viable. He required vasopressor support with phenylephrine and vasopressin infusions. He received 3000 mL of crystalloid w/ EBL of 10 mL and UOP of 25 mL. Patient was left intubated at the end of the case so SICU was consulted for hemodynamic monitoring. Taken back to the OR on 12/8 and underwent take down of Abthera, washout and re-application of the Abthera vac. Went back to OR on 12/10 night for colectomy.        24 HOUR EVENTS:  - Loperamide dose increased to 8mg. q6 hrs  - CT scan non-non of chest obtained as per thoracic to asess persistent air leak.     SUBJECTIVE/ROS:  [ ] A ten-point review of systems was otherwise negative except as noted.  [ ] Due to altered mental status/intubation, subjective information were not able to be obtained from the patient. History was obtained, to the extent possible, from review of the chart and collateral sources of information.      NEURO  RASS:     GCS:     [facility] ICU:  Exam: awake, alert, oriented  Meds: acetaminophen  IVPB .. 750 milliGRAM(s) IV Intermittent once PRN Mild Pain (1 - 3)  ondansetron Injectable 4 milliGRAM(s) IV Push every 6 hours PRN Nausea and/or Vomiting  opium Tincture 6 milliGRAM(s) Oral <User Schedule>    [x] Adequacy of sedation and pain control has been assessed and adjusted      RESPIRATORY  RR: 23 (12-22-19 @ 00:00) (18 - 36)  SpO2: 100% (12-22-19 @ 00:24) (95% - 100%)  Wt(kg): --  Exam: unlabored, clear to auscultation bilaterally  Mechanical Ventilation:     [N/A] Extubation Readiness Assessed  Meds: albuterol/ipratropium for Nebulization 3 milliLiter(s) Nebulizer every 6 hours  buDESOnide    Inhalation Suspension 0.5 milliGRAM(s) Inhalation every 12 hours        CARDIOVASCULAR  HR: 88 (12-22-19 @ 00:24) (77 - 103)  BP: 95/50 (12-22-19 @ 00:00) (76/44 - 116/56)  BP(mean): 69 (12-22-19 @ 00:00) (55 - 80)  ABP: --  ABP(mean): --  Wt(kg): --  CVP(cm H2O): --  VBG - ( 21 Dec 2019 13:33 )  pH: 7.43  /  pCO2: 49    /  pO2: 46    / HCO3: 32    / Base Excess: 7.6   /  SaO2: 75     Lactate: 0.7                Exam: regular rate and rhythm  Cardiac Rhythm: sinus  Perfusion     [x]Adequate   [ ]Inadequate  Mentation   [x]Normal       [ ]Reduced  Extremities  [x]Warm         [ ]Cool  Volume Status [ ]Hypervolemic [x]Euvolemic [ ]Hypovolemic  Meds: metoprolol tartrate 12.5 milliGRAM(s) Oral every 6 hours        GI/NUTRITION  Exam: soft, nontender, nondistended, incision C/D/I  Diet:  Meds: diphenoxylate/atropine 2 Tablet(s) Oral <User Schedule>  loperamide 8 milliGRAM(s) Oral <User Schedule>      GENITOURINARY  I&O's Detail    12-20 @ 07:01  -  12-21 @ 07:00  --------------------------------------------------------  IN:    fat emulsion (Fish Oil and Plant Based) 20% Infusion: 291.2 mL    IV PiggyBack: 100 mL    sodium chloride 0.9%.: 5200 mL    TPN (Total Parenteral Nutrition): 1200 mL  Total IN: 6791.2 mL    OUT:    Chest Tube: 40 mL    Ileostomy: 5000 mL    Incontinent per Condom Catheter: 1500 mL  Total OUT: 6540 mL    Total NET: 251.2 mL      12-21 @ 07:01  -  12-22 @ 00:30  --------------------------------------------------------  IN:    fat emulsion (Fish Oil and Plant Based) 20% Infusion: 166.4 mL    sodium chloride 0.9%.: 2750 mL    TPN (Total Parenteral Nutrition): 1700 mL  Total IN: 4616.4 mL    OUT:    Ileostomy: 2600 mL    Incontinent per Condom Catheter: 450 mL  Total OUT: 3050 mL    Total NET: 1566.4 mL          12-21    135  |  96  |  24<H>  ----------------------------<  135<H>  4.2   |  31  |  0.61    Ca    7.8<L>      21 Dec 2019 13:44  Phos  3.3     12-21  Mg     2.2     12-21    TPro  5.7<L>  /  Alb  2.2<L>  /  TBili  0.8  /  DBili  0.4<H>  /  AST  20  /  ALT  27  /  AlkPhos  123<H>  12-21    [ ] Martinez catheter, indication: N/A  Meds: fat emulsion (Fish Oil and Plant Based) 20% Infusion 20.8 mL/Hr IV Continuous <Continuous>  Parenteral Nutrition - Adult 1 Each TPN Continuous <Continuous>  sodium chloride 2 Gram(s) Oral every 12 hours  sodium chloride 0.9%. 1000 milliLiter(s) IV Continuous <Continuous>        HEMATOLOGIC  Meds: enoxaparin Injectable 40 milliGRAM(s) SubCutaneous daily    [x] VTE Prophylaxis                        8.9    9.97  )-----------( 417      ( 21 Dec 2019 01:00 )             26.9       Transfusion     [ ] PRBC   [ ] Platelets   [ ] FFP   [ ] Cryoprecipitate      INFECTIOUS DISEASES  WBC Count: 9.97 K/uL (12-21 @ 01:00)    RECENT CULTURES:    Meds:       ENDOCRINE  CAPILLARY BLOOD GLUCOSE      POCT Blood Glucose.: 129 mg/dL (21 Dec 2019 18:01)  POCT Blood Glucose.: 120 mg/dL (21 Dec 2019 12:50)  POCT Blood Glucose.: 95 mg/dL (21 Dec 2019 06:35)  POCT Blood Glucose.: 78 mg/dL (21 Dec 2019 05:51)    Meds: insulin lispro (HumaLOG) corrective regimen sliding scale   SubCutaneous every 6 hours        ACCESS DEVICES:  [ ] Peripheral IV  [ ] Central Venous Line	[ ] R	[ ] L	[ ] IJ	[ ] Fem	[ ] SC	Placed:   [ ] Arterial Line		[ ] R	[ ] L	[ ] Fem	[ ] Rad	[ ] Ax	Placed:   [ ] PICC:					[ ] Mediport  [ ] Urinary Catheter, Date Placed:   [x] Necessity of urinary, arterial, and venous catheters discussed    OTHER MEDICATIONS:  chlorhexidine 2% Cloths 1 Application(s) Topical <User Schedule>      CODE STATUS:      IMAGING: HISTORY  74y Male COPD and EtOH dependence who presented 12/6 with abdominal pain, nausea, hematemesis, and poor PO intake for ~2-3 days. Labs significant for an CHI w/ Cr 2.74 and lactate of 9.4. He was also notably tachycardic to the 110s and hypotensive w/ SBP in the 70s. He was given 2 units of PRBCs and 2 L of LR in the ED due to concern for upper GI bleeding. Imaging revealed high grade SBO in the RLQ. Patient was taken to the OR emergently for an exploratory laparotomy, lysis of adhesions, decompression of bowel via enterotomy w/ primary repair, and Abthera VAC placement. Of note, the distal 50% of the bowel appeared dusky but viable. He required vasopressor support with phenylephrine and vasopressin infusions. He received 3000 mL of crystalloid w/ EBL of 10 mL and UOP of 25 mL. Patient was left intubated at the end of the case so SICU was consulted for hemodynamic monitoring. Taken back to the OR on 12/8 and underwent take down of Abthera, washout and re-application of the Abthera vac. Went back to OR on 12/10 night for colectomy.        24 HOUR EVENTS:  - Loperamide dose increased to 8mg. q6 hrs  - CT scan non-non of chest obtained as per thoracic to asess persistent air leak.     SUBJECTIVE/ROS:  [ ] A ten-point review of systems was otherwise negative except as noted.  [ ] Due to altered mental status/intubation, subjective information were not able to be obtained from the patient. History was obtained, to the extent possible, from review of the chart and collateral sources of information.      NEURO  Exam: awake, alert, oriented  Meds: acetaminophen  IVPB .. 750 milliGRAM(s) IV Intermittent once PRN Mild Pain (1 - 3)  ondansetron Injectable 4 milliGRAM(s) IV Push every 6 hours PRN Nausea and/or Vomiting  opium Tincture 6 milliGRAM(s) Oral <User Schedule>    [x] Adequacy of sedation and pain control has been assessed and adjusted      RESPIRATORY  RR: 23 (12-22-19 @ 00:00) (18 - 36)  SpO2: 100% (12-22-19 @ 00:24) (95% - 100%)  Exam: unlabored, clear to auscultation bilaterally  Mechanical Ventilation: none  [N/A] Extubation Readiness Assessed  Meds: albuterol/ipratropium for Nebulization 3 milliLiter(s) Nebulizer every 6 hours  buDESOnide    Inhalation Suspension 0.5 milliGRAM(s) Inhalation every 12 hours        CARDIOVASCULAR  HR: 88 (12-22-19 @ 00:24) (77 - 103)  BP: 95/50 (12-22-19 @ 00:00) (76/44 - 116/56)  BP(mean): 69 (12-22-19 @ 00:00) (55 - 80)  VBG - ( 21 Dec 2019 13:33 )  pH: 7.43  /  pCO2: 49    /  pO2: 46    / HCO3: 32    / Base Excess: 7.6   /  SaO2: 75     Lactate: 0.7    Exam: regular rate and rhythm  Cardiac Rhythm: sinus  Perfusion     [x]Adequate   [ ]Inadequate  Mentation   [x]Normal       [ ]Reduced  Extremities  [x]Warm         [ ]Cool  Volume Status [ ]Hypervolemic [x]Euvolemic [ ]Hypovolemic  Meds: metoprolol tartrate 12.5 milliGRAM(s) Oral every 6 hours        GI/NUTRITION  Exam: soft, nontender, nondistended, incision C/D/I  Diet: dsyphagia diet   Meds: diphenoxylate/atropine 2 Tablet(s) Oral <User Schedule>  loperamide 8 milliGRAM(s) Oral <User Schedule>      GENITOURINARY  I&O's Detail    12-20 @ 07:01  -  12-21 @ 07:00  --------------------------------------------------------  IN:    fat emulsion (Fish Oil and Plant Based) 20% Infusion: 291.2 mL    IV PiggyBack: 100 mL    sodium chloride 0.9%.: 5200 mL    TPN (Total Parenteral Nutrition): 1200 mL  Total IN: 6791.2 mL    OUT:    Chest Tube: 40 mL    Ileostomy: 5000 mL    Incontinent per Condom Catheter: 1500 mL  Total OUT: 6540 mL    Total NET: 251.2 mL      12-21 @ 07:01  -  12-22 @ 00:30  --------------------------------------------------------  IN:    fat emulsion (Fish Oil and Plant Based) 20% Infusion: 166.4 mL    sodium chloride 0.9%.: 2750 mL    TPN (Total Parenteral Nutrition): 1700 mL  Total IN: 4616.4 mL    OUT:    Ileostomy: 2600 mL    Incontinent per Condom Catheter: 450 mL  Total OUT: 3050 mL    Total NET: 1566.4 mL          12-21    135  |  96  |  24<H>  ----------------------------<  135<H>  4.2   |  31  |  0.61    Ca    7.8<L>      21 Dec 2019 13:44  Phos  3.3     12-21  Mg     2.2     12-21    TPro  5.7<L>  /  Alb  2.2<L>  /  TBili  0.8  /  DBili  0.4<H>  /  AST  20  /  ALT  27  /  AlkPhos  123<H>  12-21    [ ] Martinez catheter, indication: N/A  Meds: fat emulsion (Fish Oil and Plant Based) 20% Infusion 20.8 mL/Hr IV Continuous <Continuous>  Parenteral Nutrition - Adult 1 Each TPN Continuous <Continuous>  sodium chloride 2 Gram(s) Oral every 12 hours  sodium chloride 0.9%. 1000 milliLiter(s) IV Continuous <Continuous>        HEMATOLOGIC  Meds: enoxaparin Injectable 40 milliGRAM(s) SubCutaneous daily    [x] VTE Prophylaxis                        8.9    9.97  )-----------( 417      ( 21 Dec 2019 01:00 )             26.9       Transfusion     [ ] PRBC   [ ] Platelets   [ ] FFP   [ ] Cryoprecipitate      INFECTIOUS DISEASES  WBC Count: 9.97 K/uL (12-21 @ 01:00)    RECENT CULTURES: none    Meds: none      ENDOCRINE  CAPILLARY BLOOD GLUCOSE      POCT Blood Glucose.: 129 mg/dL (21 Dec 2019 18:01)  POCT Blood Glucose.: 120 mg/dL (21 Dec 2019 12:50)  POCT Blood Glucose.: 95 mg/dL (21 Dec 2019 06:35)  POCT Blood Glucose.: 78 mg/dL (21 Dec 2019 05:51)    Meds: insulin lispro (HumaLOG) corrective regimen sliding scale   SubCutaneous every 6 hours        ACCESS DEVICES:  [x] Peripheral IV  [ ] Central Venous Line	[ ] R	[ ] L	[ ] IJ	[ ] Fem	[ ] SC	Placed:   [ ] Arterial Line		[ ] R	[ ] L	[ ] Fem	[ ] Rad	[ ] Ax	Placed:   [ ] PICC:					[ ] Mediport  [ ] Urinary Catheter, Date Placed:   [x] Necessity of urinary, arterial, and venous catheters discussed    OTHER MEDICATIONS:  chlorhexidine 2% Cloths 1 Application(s) Topical <User Schedule>      CODE STATUS: full code       IMAGING: < from: CT Abdomen and Pelvis No Cont (12.06.19 @ 16:48) >  FINDINGS:    LOWER CHEST: Within normal limits.    LIVER: Within normal limits.  BILE DUCTS: Normal caliber.  GALLBLADDER: Within normal limits.  SPLEEN: Within normal limits.  PANCREAS: Within normal limits.  ADRENALS: Within normal limits.  KIDNEYS/URETERS: No hydronephrosis. Nonobstructing bilateral renal   calculi.    BLADDER: Within normal limits.  REPRODUCTIVE ORGANS: Prostatectomy.    BOWEL: High-grade small bowel obstruction with transition point in the   right lower abdomen (series 602, image 34).  PERITONEUM: No ascites.  VESSELS: Atherosclerotic changes.  RETROPERITONEUM/LYMPH NODES: No lymphadenopathy.  ABDOMINAL WALL: Within normal limits.  BONES: Degenerative changes.    IMPRESSION:     High-grade small bowel obstruction with transition point in the right   lower quadrant.      < end of copied text >

## 2019-12-22 NOTE — PROGRESS NOTE ADULT - ATTENDING COMMENTS
pt seen and examined.  agree with note above.  still with extremely high ostomy output requiring aggressive replacement - not stable for tx to floor.  maximize antidiarrheal rx to slow motility  oob with PT  f/u CTSx re left ptx  continue supportive care per SICU  d/w pt/family/Sicu team in detail.

## 2019-12-22 NOTE — PROGRESS NOTE ADULT - ASSESSMENT
A/P: 74 year old male w/ PMH of COPD and EtOH dependence with PSH/o appy, prostate surgery, & spine surgery  septic shock and high grade SBO s/p exploratory laparotomy, lysis of adhesions, decompression of bowel via enterotomy w/ primary repair, and Abthera VAC placement on 12/6; s/p take down of Abthera, washout and re-application of the Abthera vac on 12/8, s/p SBR and end ileostomy on 12/10.   TPN consulted to assist w/ management of pt's nutrition in pt w/ prolonged hospital course now tolerating diet but has high Ileostomy output   -cont TPN at goal:  210g dextrose, 120g AA, 50g lipid in 2400cc total volume  -hyponatremia improving.  Cont NaPhos 60 mmol, NaAce 60 mmol, NaCl 40meq  -hypoglycemia - insulin removed from formula.  Please continue to check FS and cover with ISS  -daily BMP, Mg, Phos  -continue to monitor ostomy output and provide anti-motility agents    MARTINA King PA-C   TPN pager [phone], spectra [phone]  discussed with Dr. Chacko and Dr. MELISSA Siegel

## 2019-12-22 NOTE — PROGRESS NOTE ADULT - SUBJECTIVE AND OBJECTIVE BOX
[facility] NUTRITION SUPPORT / TPN -- FOLLOW UP NOTE  --------------------------------------------------------------------------------    24 hour events/subjective:  No acute overnight events.  Loperamide was increased to 8mg q8 hours.  Tolerating about half of each meal without N/V.  Denies SOB/CP/dizziness/palpitations.    Diet:  Diet, Dysphagia 1 Pureed-Nectar Consistency Fluid (12-21-19 @ 15:44)      PAST HISTORY  --------------------------------------------------------------------------------  No significant changes to PMH, PSH, FHx, SHx, unless otherwise noted    ALLERGIES & MEDICATIONS  --------------------------------------------------------------------------------  Allergies    No Known Allergies    Intolerances      Standing Inpatient Medications  albuterol/ipratropium for Nebulization 3 milliLiter(s) Nebulizer every 6 hours  buDESOnide    Inhalation Suspension 0.5 milliGRAM(s) Inhalation every 12 hours  chlorhexidine 2% Cloths 1 Application(s) Topical <User Schedule>  diphenoxylate/atropine 2 Tablet(s) Oral <User Schedule>  enoxaparin Injectable 40 milliGRAM(s) SubCutaneous daily  fat emulsion (Fish Oil and Plant Based) 20% Infusion 20.8 mL/Hr IV Continuous <Continuous>  insulin lispro (HumaLOG) corrective regimen sliding scale   SubCutaneous every 6 hours  loperamide 8 milliGRAM(s) Oral <User Schedule>  metoprolol tartrate 12.5 milliGRAM(s) Oral every 6 hours  opium Tincture 6 milliGRAM(s) Oral <User Schedule>  Parenteral Nutrition - Adult 1 Each TPN Continuous <Continuous>  Parenteral Nutrition - Adult 1 Each TPN Continuous <Continuous>  sodium chloride 2 Gram(s) Oral every 12 hours  sodium chloride 0.9%. 1000 milliLiter(s) IV Continuous <Continuous>    PRN Inpatient Medications  acetaminophen  IVPB .. 750 milliGRAM(s) IV Intermittent once PRN  ondansetron Injectable 4 milliGRAM(s) IV Push every 6 hours PRN      VITALS/PHYSICAL EXAM  --------------------------------------------------------------------------------  T(C): 37.1 (12-22-19 @ 03:00), Max: 37.3 (12-21-19 @ 23:00)  HR: 99 (12-22-19 @ 11:00) (83 - 103)  BP: 98/50 (12-22-19 @ 11:00) (87/51 - 139/63)  RR: 33 (12-22-19 @ 11:00) (18 - 44)  SpO2: 99% (12-22-19 @ 11:00) (96% - 100%)  Wt(kg): --        12-21-19 @ 07:01  -  12-22-19 @ 07:00  --------------------------------------------------------  IN: 6569.4 mL / OUT: 5700 mL / NET: 869.4 mL      Physical Exam:  	    Gen:  A&Ox3, sitting in bed            HEENT: NC/AT, PERRL, supple neck, clear oropharynx, mucosa moist  	Pulm: decreased BS B/L, Lt sided pigtail to suction  	Abd: (+) BS, softly distended, non tender,                    midline incision c/d/i                    ostomy with gas and stool, connected to rosas bag                   LLQ JOVANNA w/ serosanguinous drainage                 PICC dressing c/d/I   	Skin: Warm, without rashes, good turgor          LABS/STUDIES  --------------------------------------------------------------------------------              8.1    9.42  >-----------<  444      [12-22-19 @ 00:35]              24.8     134  |  99  |  23  ----------------------------<  137      [12-22-19 @ 00:35]  4.4   |  29  |  0.58        Ca     7.8     [12-22-19 @ 00:35]      Mg     2.0     [12-22-19 @ 00:35]      Phos  3.1     [12-22-19 @ 00:35]    TPro  5.4  /  Alb  2.0  /  TBili  0.6  /  DBili  0.3  /  AST  18  /  ALT  27  /  AlkPhos  114  [12-22-19 @ 00:35]        Serum Osmolality 288      [12-22-19 @ 00:35]    Ca ionizedBlood Gas Calcium, Ionized - Venous: 1.10 mmoL/L (12-21-19 @ 13:33)    Creatinine Trend:  POC glucoseGlucose, Serum: 137 mg/dL (12-22-19 @ 00:35)  Glucose, Serum: 135 mg/dL (12-21-19 @ 13:44)  CAPILLARY BLOOD GLUCOSE      POCT Blood Glucose.: 116 mg/dL (22 Dec 2019 06:26)  POCT Blood Glucose.: 129 mg/dL (21 Dec 2019 18:01)  POCT Blood Glucose.: 120 mg/dL (21 Dec 2019 12:50)    PrealbuminPrealbumin, Serum: 15 mg/dL (12-20-19 @ 02:49)  Prealbumin, Serum: 5 mg/dL (12-13-19 @ 07:46)    Triglycerides

## 2019-12-22 NOTE — PROGRESS NOTE ADULT - PROBLEM SELECTOR PLAN 1
As D/w Dr. Marte: CT imaging reviewed still moderate fluid to left lung and recommends asking IR to tap for drainage.  Will continue to follow

## 2019-12-22 NOTE — PROGRESS NOTE ADULT - SUBJECTIVE AND OBJECTIVE BOX
Subjective: Patient seen and examined. No new events except as noted.   remains in ICU   no cp or sob     REVIEW OF SYSTEMS:    CONSTITUTIONAL:+ weakness, fevers or chills  EYES/ENT: No visual changes;  No vertigo or throat pain   NECK: No pain or stiffness  RESPIRATORY: No cough, wheezing, hemoptysis; No shortness of breath  CARDIOVASCULAR: No chest pain or palpitations  GASTROINTESTINAL: No abdominal or epigastric pain. No nausea, vomiting, or hematemesis; No diarrhea or constipation. No melena or hematochezia.  GENITOURINARY: No dysuria, frequency or hematuria  NEUROLOGICAL: No numbness or weakness  SKIN: No itching, burning, rashes, or lesions   All other review of systems is negative unless indicated above.    MEDICATIONS:  MEDICATIONS  (STANDING):  albuterol/ipratropium for Nebulization 3 milliLiter(s) Nebulizer every 6 hours  buDESOnide    Inhalation Suspension 0.5 milliGRAM(s) Inhalation every 12 hours  chlorhexidine 2% Cloths 1 Application(s) Topical <User Schedule>  diphenoxylate/atropine 2 Tablet(s) Oral <User Schedule>  enoxaparin Injectable 40 milliGRAM(s) SubCutaneous daily  fat emulsion (Fish Oil and Plant Based) 20% Infusion 20.8 mL/Hr (20.8 mL/Hr) IV Continuous <Continuous>  insulin lispro (HumaLOG) corrective regimen sliding scale   SubCutaneous every 6 hours  loperamide 8 milliGRAM(s) Oral <User Schedule>  metoprolol tartrate 12.5 milliGRAM(s) Oral every 6 hours  opium Tincture 6 milliGRAM(s) Oral <User Schedule>  Parenteral Nutrition - Adult 1 Each (100 mL/Hr) TPN Continuous <Continuous>  Parenteral Nutrition - Adult 1 Each (100 mL/Hr) TPN Continuous <Continuous>  sodium chloride 2 Gram(s) Oral every 12 hours  sodium chloride 0.9%. 1000 milliLiter(s) (10 mL/Hr) IV Continuous <Continuous>      PHYSICAL EXAM:  T(C): 37.3 (12-22-19 @ 07:00), Max: 37.3 (12-21-19 @ 23:00)  HR: 99 (12-22-19 @ 11:00) (83 - 103)  BP: 98/50 (12-22-19 @ 11:00) (87/51 - 139/63)  RR: 33 (12-22-19 @ 11:00) (18 - 44)  SpO2: 99% (12-22-19 @ 11:00) (96% - 100%)  Wt(kg): --  I&O's Summary    21 Dec 2019 07:01  -  22 Dec 2019 07:00  --------------------------------------------------------  IN: 6569.4 mL / OUT: 5700 mL / NET: 869.4 mL          Appearance: NAD   HEENT:   Dry oral mucosa RIJ line   Lymphatic: No lymphadenopathy   Cardiovascular: Normal S1 S2, No JVD, No murmurs , Peripheral pulses palpable 2+ bilaterally  Respiratory: Decreased bs   Gastrointestinal:  Soft, NT, ND. Ostomy pink with output. Midline staples in place, midline incision site is c/d/i   Skin: No rashes, No ecchymoses, No cyanosis, warm to touch  Musculoskeletal: Decreased  range of motion and strength  Psychiatry:  mildly sedated   Ext: No edema +PICC line   +rosas   +rectal tube       LABS:    CARDIAC MARKERS:                                8.1    9.42  )-----------( 444      ( 22 Dec 2019 00:35 )             24.8     12-22    134<L>  |  99  |  23  ----------------------------<  137<H>  4.4   |  29  |  0.58    Ca    7.8<L>      22 Dec 2019 00:35  Phos  3.1     12-22  Mg     2.0     12-22    TPro  5.4<L>  /  Alb  2.0<L>  /  TBili  0.6  /  DBili  0.3<H>  /  AST  18  /  ALT  27  /  AlkPhos  114  12-22    proBNP:   Lipid Profile:   HgA1c:   TSH:             TELEMETRY: SR	    ECG:  	  RADIOLOGY: < from: CT Chest No Cont (12.21.19 @ 19:39) >    EXAM:  CT CHEST                            PROCEDURE DATE:  12/21/2019            INTERPRETATION:  CLINICAL INFORMATION: Persistent pneumothorax, evaluate.Status post enterotomy for small bowel obstruction, small bowel resection,  colectomy, and end ileostomy.    COMPARISON: Chest x-ray from 12/21/2019..    PROCEDURE:   CT of the Chest was performed without intravenous contrast.  Sagittal and coronal reformats were performed.    FINDINGS:    LUNGS AND AIRWAYS: Debris in the trachea. Patchy airspace opacities in the right lower lobe. Partial compressive atelectasis of the left lower lobe, and mild subsegmental atelectasis of the right lower lobe. Emphysema.     PLEURA: Left-sided chest tube in place. Trace left apical pneumothorax. Moderate left and small right pleural effusions.     MEDIASTINUM AND MIKI: No lymphadenopathy.    VESSELS: Atherosclerotic calcifications.    HEART: Heart size is normal. No pericardial effusion.    CHEST WALL AND LOWER NECK: Within normal limits.    VISUALIZED UPPER ABDOMEN: Within normal limits.    BONES: Degenerative changes.     IMPRESSION:   Left-sided chest tube in place with trace left apical pneumothorax.   Patchy airspace opacities in the right lower lobe, differential favors distal mucoid impacted airways versus pneumonia.  Moderate left and small right pleural effusions with mild subsegmental atelectasis of the right lower lobe.                    LATONIA GARCIA M.D., RADIOLOGY RESIDENT  This document has been electronically signed.  BAKARI GREENWOOD M.D., ATTENDING RADIOLOGIST  This document has been electronically signed. Dec 22 2019 10:25AM                < end of copied text >    DIAGNOSTIC TESTING:  [ ] Echocardiogram:  [ ]  Catheterization:  [ ] Stress Test:    OTHER:

## 2019-12-22 NOTE — PROGRESS NOTE ADULT - ATTENDING COMMENTS
ON:  4.5 liter ostomy , positive 800 cc with 1 to 1 NS repletion, concern for short [place] functional vs inadequate therapy for stan output  increased antimotility  CT non con chest per thoracic request  remained to suction for chest tube  AM CXR, no large PTX    - cont dysphagia diet  - cont 1 to 1 repletion with NS  - Q12 hr labs  - cont vte PPX  - off antibiotics  - goal even to slightly positive given high output ostomy  - place chest tube to water seal  - repeat CXR 6hrs  - PT OT  - TPN to cont  - remains in SICU for management of fluid loss

## 2019-12-22 NOTE — PROGRESS NOTE ADULT - ASSESSMENT
75 y/o M presenting with septic shock and high grade SBO s/p exploratory laparotomy, lysis of adhesions, decompression of bowel via enterotomy w/ primary repair, and Abthera VAC placement on 12/6; s/p take down of Abthera, washout and re-application of the Abthera vac on 12/8. Now s/p SBR and end ileostomy on 12/10 acute respiratory distress now improving, Pneumothorax, hyperglycemia, delirium.    PLAN:    Neuro: A&Ox3   - off sedation and standing pain meds    Resp: acute respiratory failure upon initial presentation, COPD at baseline, spontaneous left sided pneumothorax s/p pig-tail catheter placement.   - Continue to monitor on RA  - Continue Duonebs, budesonide  - Left sided pigtail to suction, thoracic surgery consulted, f/u on CT scan and thoracic surgery recs    CV: septic shock requiring vasopressor support, now hemodynamically stable   - metoprolol 12.5 q6 hrs    GI: SBO s/p exploratory laparotomy, lysis of adhesions, decompression of bowel via enterotomy w/ primary repair, and Abthera VAC placement and removal with 150 cm of small bowel removed and end ileostomy.   - Regular diet w/ Ensure and TPN (calorie count)  - Monitor ostomy output, 1 : 1 repletions, lomotil 2 tabs q6hrs & Imodium 8 mg q6hrs & tincture of opium 6 mg q6hrs    Renal: CHI, resolved   - Replete electrolytes as needed  - Monitor I/Os    Heme: no acute issues  - Lovenox for VTE prophylaxis  - SCDs     ID: No active issues   - Trend WBC, monitor fever curve    Endo: no acute issues  - Started on sliding scale with initiation of TPN, q6 fingersticks    Dispo: SICU full code    - Buster Bishop PA-C

## 2019-12-23 DIAGNOSIS — R21 RASH AND OTHER NONSPECIFIC SKIN ERUPTION: ICD-10-CM

## 2019-12-23 LAB
ALBUMIN SERPL ELPH-MCNC: 2 G/DL — LOW (ref 3.3–5)
ALP SERPL-CCNC: 124 U/L — HIGH (ref 40–120)
ALT FLD-CCNC: 37 U/L — SIGNIFICANT CHANGE UP (ref 10–45)
ANION GAP SERPL CALC-SCNC: 8 MMOL/L — SIGNIFICANT CHANGE UP (ref 5–17)
ANION GAP SERPL CALC-SCNC: 8 MMOL/L — SIGNIFICANT CHANGE UP (ref 5–17)
APTT BLD: 29.3 SEC — SIGNIFICANT CHANGE UP (ref 27.5–36.3)
AST SERPL-CCNC: 26 U/L — SIGNIFICANT CHANGE UP (ref 10–40)
BILIRUB DIRECT SERPL-MCNC: 0.2 MG/DL — SIGNIFICANT CHANGE UP (ref 0–0.2)
BILIRUB INDIRECT FLD-MCNC: 0.2 MG/DL — SIGNIFICANT CHANGE UP (ref 0.2–1)
BILIRUB SERPL-MCNC: 0.4 MG/DL — SIGNIFICANT CHANGE UP (ref 0.2–1.2)
BUN SERPL-MCNC: 18 MG/DL — SIGNIFICANT CHANGE UP (ref 7–23)
BUN SERPL-MCNC: 19 MG/DL — SIGNIFICANT CHANGE UP (ref 7–23)
CALCIUM SERPL-MCNC: 7.7 MG/DL — LOW (ref 8.4–10.5)
CALCIUM SERPL-MCNC: 7.9 MG/DL — LOW (ref 8.4–10.5)
CHLORIDE SERPL-SCNC: 102 MMOL/L — SIGNIFICANT CHANGE UP (ref 96–108)
CHLORIDE SERPL-SCNC: 104 MMOL/L — SIGNIFICANT CHANGE UP (ref 96–108)
CO2 SERPL-SCNC: 24 MMOL/L — SIGNIFICANT CHANGE UP (ref 22–31)
CO2 SERPL-SCNC: 26 MMOL/L — SIGNIFICANT CHANGE UP (ref 22–31)
CREAT SERPL-MCNC: 0.48 MG/DL — LOW (ref 0.5–1.3)
CREAT SERPL-MCNC: 0.52 MG/DL — SIGNIFICANT CHANGE UP (ref 0.5–1.3)
GLUCOSE BLDC GLUCOMTR-MCNC: 101 MG/DL — HIGH (ref 70–99)
GLUCOSE BLDC GLUCOMTR-MCNC: 104 MG/DL — HIGH (ref 70–99)
GLUCOSE BLDC GLUCOMTR-MCNC: 150 MG/DL — HIGH (ref 70–99)
GLUCOSE BLDC GLUCOMTR-MCNC: 36 MG/DL — CRITICAL LOW (ref 70–99)
GLUCOSE BLDC GLUCOMTR-MCNC: 95 MG/DL — SIGNIFICANT CHANGE UP (ref 70–99)
GLUCOSE SERPL-MCNC: 128 MG/DL — HIGH (ref 70–99)
GLUCOSE SERPL-MCNC: 93 MG/DL — SIGNIFICANT CHANGE UP (ref 70–99)
HCT VFR BLD CALC: 25 % — LOW (ref 39–50)
HGB BLD-MCNC: 8.1 G/DL — LOW (ref 13–17)
INR BLD: 1.08 RATIO — SIGNIFICANT CHANGE UP (ref 0.88–1.16)
MAGNESIUM SERPL-MCNC: 2 MG/DL — SIGNIFICANT CHANGE UP (ref 1.6–2.6)
MAGNESIUM SERPL-MCNC: 2.2 MG/DL — SIGNIFICANT CHANGE UP (ref 1.6–2.6)
MCHC RBC-ENTMCNC: 30.2 PG — SIGNIFICANT CHANGE UP (ref 27–34)
MCHC RBC-ENTMCNC: 32.4 GM/DL — SIGNIFICANT CHANGE UP (ref 32–36)
MCV RBC AUTO: 93.3 FL — SIGNIFICANT CHANGE UP (ref 80–100)
NRBC # BLD: 0 /100 WBCS — SIGNIFICANT CHANGE UP (ref 0–0)
PHOSPHATE SERPL-MCNC: 3 MG/DL — SIGNIFICANT CHANGE UP (ref 2.5–4.5)
PHOSPHATE SERPL-MCNC: 3.5 MG/DL — SIGNIFICANT CHANGE UP (ref 2.5–4.5)
PLATELET # BLD AUTO: 411 K/UL — HIGH (ref 150–400)
POTASSIUM SERPL-MCNC: 4.7 MMOL/L — SIGNIFICANT CHANGE UP (ref 3.5–5.3)
POTASSIUM SERPL-MCNC: 4.8 MMOL/L — SIGNIFICANT CHANGE UP (ref 3.5–5.3)
POTASSIUM SERPL-SCNC: 4.7 MMOL/L — SIGNIFICANT CHANGE UP (ref 3.5–5.3)
POTASSIUM SERPL-SCNC: 4.8 MMOL/L — SIGNIFICANT CHANGE UP (ref 3.5–5.3)
PREALB SERPL-MCNC: 14 MG/DL — LOW (ref 20–40)
PROT SERPL-MCNC: 5.4 G/DL — LOW (ref 6–8.3)
PROTHROM AB SERPL-ACNC: 12.4 SEC — SIGNIFICANT CHANGE UP (ref 10–12.9)
RBC # BLD: 2.68 M/UL — LOW (ref 4.2–5.8)
RBC # FLD: 13.5 % — SIGNIFICANT CHANGE UP (ref 10.3–14.5)
SODIUM SERPL-SCNC: 136 MMOL/L — SIGNIFICANT CHANGE UP (ref 135–145)
SODIUM SERPL-SCNC: 136 MMOL/L — SIGNIFICANT CHANGE UP (ref 135–145)
TRIGL SERPL-MCNC: 73 MG/DL — SIGNIFICANT CHANGE UP (ref 10–149)
WBC # BLD: 9.44 K/UL — SIGNIFICANT CHANGE UP (ref 3.8–10.5)
WBC # FLD AUTO: 9.44 K/UL — SIGNIFICANT CHANGE UP (ref 3.8–10.5)

## 2019-12-23 PROCEDURE — 71045 X-RAY EXAM CHEST 1 VIEW: CPT | Mod: 26

## 2019-12-23 PROCEDURE — 99232 SBSQ HOSP IP/OBS MODERATE 35: CPT

## 2019-12-23 RX ORDER — ACYCLOVIR SODIUM 500 MG
400 VIAL (EA) INTRAVENOUS THREE TIMES A DAY
Refills: 0 | Status: COMPLETED | OUTPATIENT
Start: 2019-12-23 | End: 2020-01-02

## 2019-12-23 RX ORDER — ELECTROLYTE SOLUTION,INJ
1 VIAL (ML) INTRAVENOUS
Refills: 0 | Status: DISCONTINUED | OUTPATIENT
Start: 2019-12-23 | End: 2019-12-23

## 2019-12-23 RX ORDER — PANTOPRAZOLE SODIUM 20 MG/1
40 TABLET, DELAYED RELEASE ORAL ONCE
Refills: 0 | Status: COMPLETED | OUTPATIENT
Start: 2019-12-23 | End: 2019-12-23

## 2019-12-23 RX ORDER — I.V. FAT EMULSION 20 G/100ML
20.8 EMULSION INTRAVENOUS
Qty: 50 | Refills: 0 | Status: DISCONTINUED | OUTPATIENT
Start: 2019-12-23 | End: 2019-12-24

## 2019-12-23 RX ORDER — SODIUM CHLORIDE 9 MG/ML
1000 INJECTION, SOLUTION INTRAVENOUS
Refills: 0 | Status: DISCONTINUED | OUTPATIENT
Start: 2019-12-23 | End: 2019-12-24

## 2019-12-23 RX ADMIN — Medication 12.5 MILLIGRAM(S): at 17:45

## 2019-12-23 RX ADMIN — SODIUM CHLORIDE 10 MILLILITER(S): 9 INJECTION, SOLUTION INTRAVENOUS at 08:01

## 2019-12-23 RX ADMIN — Medication 2 TABLET(S): at 21:25

## 2019-12-23 RX ADMIN — MORPHINE 6 MILLIGRAM(S): 10 SOLUTION ORAL at 17:45

## 2019-12-23 RX ADMIN — Medication 2 TABLET(S): at 17:45

## 2019-12-23 RX ADMIN — MORPHINE 6 MILLIGRAM(S): 10 SOLUTION ORAL at 11:26

## 2019-12-23 RX ADMIN — Medication 4 MILLIGRAM(S): at 11:27

## 2019-12-23 RX ADMIN — SODIUM CHLORIDE 10 MILLILITER(S): 9 INJECTION, SOLUTION INTRAVENOUS at 21:25

## 2019-12-23 RX ADMIN — I.V. FAT EMULSION 20.8 ML/HR: 20 EMULSION INTRAVENOUS at 17:45

## 2019-12-23 RX ADMIN — SODIUM CHLORIDE 10 MILLILITER(S): 9 INJECTION, SOLUTION INTRAVENOUS at 06:00

## 2019-12-23 RX ADMIN — Medication 400 MILLIGRAM(S): at 21:25

## 2019-12-23 RX ADMIN — Medication 4 MILLIGRAM(S): at 21:25

## 2019-12-23 RX ADMIN — Medication 12.5 MILLIGRAM(S): at 06:00

## 2019-12-23 RX ADMIN — Medication 4 MILLIGRAM(S): at 08:01

## 2019-12-23 RX ADMIN — Medication 2 TABLET(S): at 08:01

## 2019-12-23 RX ADMIN — CHLORHEXIDINE GLUCONATE 1 APPLICATION(S): 213 SOLUTION TOPICAL at 06:00

## 2019-12-23 RX ADMIN — Medication 1 EACH: at 17:45

## 2019-12-23 RX ADMIN — Medication 4 MILLIGRAM(S): at 17:45

## 2019-12-23 RX ADMIN — Medication 3 MILLILITER(S): at 06:10

## 2019-12-23 RX ADMIN — Medication 400 MILLIGRAM(S): at 13:51

## 2019-12-23 RX ADMIN — Medication 3 MILLILITER(S): at 17:54

## 2019-12-23 RX ADMIN — Medication 0.5 MILLIGRAM(S): at 06:10

## 2019-12-23 RX ADMIN — MORPHINE 6 MILLIGRAM(S): 10 SOLUTION ORAL at 08:01

## 2019-12-23 RX ADMIN — Medication 3 MILLILITER(S): at 12:14

## 2019-12-23 RX ADMIN — Medication 2 TABLET(S): at 11:26

## 2019-12-23 RX ADMIN — ENOXAPARIN SODIUM 40 MILLIGRAM(S): 100 INJECTION SUBCUTANEOUS at 11:26

## 2019-12-23 RX ADMIN — MORPHINE 6 MILLIGRAM(S): 10 SOLUTION ORAL at 21:25

## 2019-12-23 RX ADMIN — Medication 12.5 MILLIGRAM(S): at 02:04

## 2019-12-23 RX ADMIN — Medication 0.5 MILLIGRAM(S): at 17:54

## 2019-12-23 RX ADMIN — PANTOPRAZOLE SODIUM 40 MILLIGRAM(S): 20 TABLET, DELAYED RELEASE ORAL at 13:51

## 2019-12-23 NOTE — PROGRESS NOTE ADULT - ASSESSMENT
A/P: 74 year old male w/ PMH of COPD and EtOH dependence with PSH/o appy, prostate surgery, & spine surgery  septic shock and high grade SBO s/p exploratory laparotomy, lysis of adhesions, decompression of bowel via enterotomy w/ primary repair, and Abthera VAC placement on 12/6; s/p take down of Abthera, washout and re-application of the Abthera vac on 12/8. Now s/p SBR and end ileostomy on 12/10.   TPN consulted to assist w/ management of pt's nutrition in pt w/ prolonged hospital course now tolerating diet but has high Ileostomy output     Continue TPN at full strength  pt w/ severe Protein-Calorie Malnutrition-  Pt on dysphagia diet, awaiting complete eval by speech & swallow     TPN GOAL:  120 grams amino acids (800ml 15% a.a.)  210 grams dextrose (300ml 70% dex)  50 grams SMOFlipid (250ml 20%IVFE @ 20.8ml/hr x 12 hours)  in 2400mL volume  Micronutrients: 10ml MVI, 3ml MTE-5      HypoNa & ELevated Bicarb- improving & will continue to monitor-  High Ileostomy output- On TPN and replete as per SICU        Continue to monitor for CHI -BUN/ Crt - improving  HypoK improving with increased KCl in TPN  PICC w/dedicated port for only TPN - maintenance as per protocol  Strict Intake and Output- high ileostomy output - replete with NS as per SICU continue to monitor      pt now on lomotil, imodium, and tincture of opium  Weights three times a week  Monitor BMP, Mg, Ionized Ca, Phosphorus daily  Continue to monitor Triglycerides weekly and Pre-albumin weekly  Hyperglycemia - improved - Insulin removed from TPN      Fingersticks to monitor glucose every 6 hours until stable, may be decreased to twice a day      ISS coverage - to be ordered by primary team  Continue as per SICU / Surgery, will follow with you, D/w primary team    Andreina Hubbard PA-C  TPN team, pager [phone]  D/w Ana M Siegel

## 2019-12-23 NOTE — SWALLOW FEES ASSESSMENT ADULT - DIAGNOSTIC IMPRESSIONS
SLP attempted to see Pt for FEES exam today however Pt is currently NPO for a procedure.  Therefore, per d/w Dr. Chapa, FEES to be rescheduled for tomorrow in conjunction with Pulmonologist.

## 2019-12-23 NOTE — PROGRESS NOTE ADULT - SUBJECTIVE AND OBJECTIVE BOX
Subjective " I feel ok"    Vital Signs Last 24 Hrs  T(C): 37.3 (19 @ 07:00), Max: 37.7 (19 @ 15:00)  T(F): 99.1 (19 @ 07:00), Max: 99.9 (19 @ 15:00)  HR: 93 (19 @ 10:00) (86 - 104)  BP: 96/55 (19 @ 10:00) (94/50 - 166/74)  RR: 32 (19 @ 10:00) (19 - 36)  SpO2: 99% (19 @ 10:00) (91% - 100%)           136  |  104  |  19  ----------------------------<  128<H>  4.8   |  24  |  0.52    Ca    7.7<L>      23 Dec 2019 00:50  Phos  3.0       Mg     2.2         TPro  5.4<L>  /  Alb  2.0<L>  /  TBili  0.4  /  DBili  0.2  /  AST  26  /  ALT  37  /  AlkPhos  124<H>   @ 07:  -   @ 07:00  --------------------------------------------------------  IN: 7838.8 mL / OUT: 6000 mL / NET: 1838.8 mL     @ 07:01  -   @ 11:36  --------------------------------------------------------  IN: 330 mL / OUT: 0 mL / NET: 330 mL    Daily Weight in k.6 (23 Dec 2019 00:21      POCT Blood Glucose.: 104 mg/dL (23 Dec 2019 11:29)  POCT Blood Glucose.: 95 mg/dL (23 Dec 2019 05:55  )MEDICATIONS  (STANDING):  acyclovir   Oral Tab/Cap 400 milliGRAM(s) Oral three times a day  albuterol/ipratropium for Nebulization 3 milliLiter(s) Nebulizer every 6 hours  buDESOnide    Inhalation Suspension 0.5 milliGRAM(s) Inhalation every 12 hours  chlorhexidine 2% Cloths 1 Application(s) Topical <User Schedule>  diphenoxylate/atropine 2 Tablet(s) Oral <User Schedule>  enoxaparin Injectable 40 milliGRAM(s) SubCutaneous daily  fat emulsion (Fish Oil and Plant Based) 20% Infusion 20.8 mL/Hr (20.8 mL/Hr) IV Continuous <Continuous>  insulin lispro (HumaLOG) corrective regimen sliding scale   SubCutaneous every 6 hours  lactated ringers. 1000 milliLiter(s) (10 mL/Hr) IV Continuous <Continuous>  loperamide 4 milliGRAM(s) Oral <User Schedule>  metoprolol tartrate 12.5 milliGRAM(s) Oral every 6 hours  opium Tincture 6 milliGRAM(s) Oral <User Schedule>  pantoprazole  Injectable 40 milliGRAM(s) IV Push once  Parenteral Nutrition - Adult 1 Each (100 mL/Hr) TPN Continuous <Continuous>    MEDICATIONS  (PRN):  acetaminophen  IVPB .. 750 milliGRAM(s) IV Intermittent once PRN Mild Pain (1 - 3)  ondansetron Injectable 4 milliGRAM(s) IV Push every 6 hours PRN Nausea and/or Vomiting        Drains:     MS    bulb 0/0                L Pleural  [ x ]  Drainage:  0/0        illeostomy 500  PHYSICAL EXAM  Neurology: alert and oriented x 3, nonfocal, no gross deficits  CV : S1S2 rrr  Lungs:  b/l breath sounds on room air  left pleural tube in lace to water-seal     Abdomen: soft, nontender, nondistended,staple illeostomy    :  voidscondom cath              Extremities:  b/lle warm well perfused no calf tenderness                                           Discussed with Cardiothoracic Team at AM rounds.

## 2019-12-23 NOTE — PROGRESS NOTE ADULT - ASSESSMENT
75 y/o M presenting with septic shock and high grade SBO s/p exploratory laparotomy, lysis of adhesions, decompression of bowel via enterotomy w/ primary repair, and Abthera VAC placement on 12/6; s/p take down of Abthera, washout and re-application of the Abthera vac on 12/8. Now s/p SBR and end ileostomy on 12/10 acute respiratory distress now improving, Pneumothorax, hyperglycemia, delirium. Now still with persistent pneumothorax when chest pigtail clamped.   12/23 VSS on room air recommending IR drainage of left chest

## 2019-12-23 NOTE — PROGRESS NOTE ADULT - ATTENDING COMMENTS
pt seen and examined.  agree with note above.  still with extremely high ostomy output requiring aggressive replacement - not stable for tx to floor.  maximize antidiarrheal rx to slow motility  oob with PT  f/u CTSx/IR re left ptx  continue supportive care per SICU  d/w pt/Sicu team in detail.

## 2019-12-23 NOTE — PROGRESS NOTE ADULT - PROBLEM SELECTOR PLAN 1
As D/w Dr. Marte: CT imaging reviewed still moderate fluid to left lung and recommends asking IR to tap for drainage.  Will continue to follow  Care as per primary team

## 2019-12-23 NOTE — PROGRESS NOTE ADULT - ASSESSMENT
75 y/o M presenting with septic shock and high grade SBO s/p exploratory laparotomy, lysis of adhesions, decompression of bowel via enterotomy w/ primary repair, and Abthera VAC placement on 12/6; s/p take down of Abthera, washout and re-application of the Abthera vac on 12/8. Now s/p SBR and end ileostomy on 12/10 acute respiratory distress now improving, Pneumothorax, hyperglycemia, delirium.    PLAN:    Neuro: A&Ox3   - Off sedation and standing pain meds    Resp: acute respiratory failure upon initial presentation, COPD at baseline, spontaneous left sided pneumothorax s/p pig-tail catheter placement.   - Continue to monitor on RA  - Continue Duonebs, budesonide  - Left sided pigtail to suction, thoracic surgery recommending IR L chest tap   - Consult IR for L chest tap  - F/u AM CXR for evaluation of PTX    CV: septic shock requiring vasopressor support, now hemodynamically stable   - Metoprolol 12.5 q6 hrs    GI: SBO s/p exploratory laparotomy, lysis of adhesions, decompression of bowel via enterotomy w/ primary repair, and Abthera VAC placement and removal with 150 cm of small bowel removed and end ileostomy.   - Regular diet w/ Ensure and TPN (calorie count) / NPO at MN for possible IR chest drainage  - Monitor ostomy output, LR 1 : 1 repletions, lomotil 2 tabs q6hrs & Imodium 4 mg q6hrs & tincture of opium 6 mg q6hrs    Renal: CHI, resolved   - Replete electrolytes as needed  - Monitor I/Os    Heme: no acute issues  - Lovenox for VTE prophylaxis  - SCDs     ID: No active issues   - Trend WBC, monitor fever curve    Endo: no acute issues  - SSI, q6 fingersticks    Dispo: SICU full code

## 2019-12-23 NOTE — PROGRESS NOTE ADULT - SUBJECTIVE AND OBJECTIVE BOX
GENERAL SURGERY PROGRESS NOTE    SUBJECTIVE  Patient seen and examined. high ileostomy output  imodium 4mg q6, opium tincture, lomotil  CT on waterseal  CTS 12/22 recs: IR tap for L lower lobe fluid  Reports tolerating diet without nausea, vomiting  Denies fever, chills, SOB, chest pain.   JOVANNA low output      OBJECTIVE    PHYSICAL EXAM  General: Appears well, NAD  CHEST: breathing comfortably, CT SS waterseal  CV: appears well perfused  Abdomen: soft, nontender, nondistended, no rebound or guarding, JOVANNA SS, ostomy +/+, incision c/d/i  Extremities: Grossly symmetric    T(C): 37.3 (12-23-19 @ 07:00), Max: 37.7 (12-22-19 @ 15:00)  HR: 100 (12-23-19 @ 07:00) (86 - 104)  BP: 155/74 (12-23-19 @ 07:00) (93/53 - 166/74)  RR: 31 (12-23-19 @ 07:00) (19 - 36)  SpO2: 96% (12-23-19 @ 07:00) (91% - 100%)    12-22-19 @ 07:01  -  12-23-19 @ 07:00  --------------------------------------------------------  IN: 7838.8 mL / OUT: 6000 mL / NET: 1838.8 mL        MEDICATIONS  acetaminophen  IVPB .. 750 milliGRAM(s) IV Intermittent once PRN  albuterol/ipratropium for Nebulization 3 milliLiter(s) Nebulizer every 6 hours  buDESOnide    Inhalation Suspension 0.5 milliGRAM(s) Inhalation every 12 hours  chlorhexidine 2% Cloths 1 Application(s) Topical <User Schedule>  diphenoxylate/atropine 2 Tablet(s) Oral <User Schedule>  enoxaparin Injectable 40 milliGRAM(s) SubCutaneous daily  fat emulsion (Fish Oil and Plant Based) 20% Infusion 20.8 mL/Hr IV Continuous <Continuous>  insulin lispro (HumaLOG) corrective regimen sliding scale   SubCutaneous every 6 hours  lactated ringers. 1000 milliLiter(s) IV Continuous <Continuous>  loperamide 4 milliGRAM(s) Oral <User Schedule>  metoprolol tartrate 12.5 milliGRAM(s) Oral every 6 hours  ondansetron Injectable 4 milliGRAM(s) IV Push every 6 hours PRN  opium Tincture 6 milliGRAM(s) Oral <User Schedule>  Parenteral Nutrition - Adult 1 Each TPN Continuous <Continuous>      LABS                        8.1    9.44  )-----------( 411      ( 23 Dec 2019 00:50 )             25.0     12-23    136  |  104  |  19  ----------------------------<  128<H>  4.8   |  24  |  0.52    Ca    7.7<L>      23 Dec 2019 00:50  Phos  3.0     12-23  Mg     2.2     12-23    TPro  5.4<L>  /  Alb  2.0<L>  /  TBili  0.4  /  DBili  0.2  /  AST  26  /  ALT  37  /  AlkPhos  124<H>  12-23    PT/INR - ( 23 Dec 2019 02:31 )   PT: 12.4 sec;   INR: 1.08 ratio         PTT - ( 23 Dec 2019 00:50 )  PTT:29.3 sec      RADIOLOGY & ADDITIONAL STUDIES

## 2019-12-23 NOTE — PROGRESS NOTE ADULT - SUBJECTIVE AND OBJECTIVE BOX
[facility] NUTRITION SUPPORT / TPN -- FOLLOW UP NOTE  --------------------------------------------------------------------------------    24 hour events/subjective:  74y Male with a past medical history of COPD and EtOH dependence who presented 12/6 with abdominal pain, nausea, hematemesis, and poor PO intake for ~2-3 days. Labs significant for an CHI w/ Cr 2.74 and lactate of 9.4. He was also notably tachycardic to the 110s and hypotensive w/ SBP in the 70s. He was given 2 units of PRBCs and 2 L of LR in the ED due to concern for upper GI bleeding. Imaging revealed high grade SBO in the RLQ. Patient was taken to the OR emergently for an exploratory laparotomy, lysis of adhesions, decompression of bowel via enterotomy w/ primary repair, and Abthera VAC placement. Of note, the distal 50% of the bowel appeared dusky but viable. He required vasopressor support with phenylephrine and vasopressin infusions. He received 3000 mL of crystalloid w/ EBL of 10 mL and UOP of 25 mL. Patient was left intubated at the end of the case so SICU was consulted for hemodynamic monitoring. Taken back to the OR on 12/8 and underwent take down of Abthera, washout and re-application of the Abthera vac. Went back to OR on 12/10 night for colectomy with end ileostomy/ mucous fistula.    Pt w/ Lt PTX - 12/15 - pigtail cath placed- Chest tube on waterseal with no evidence of leak      CT surgery recommending IR L chest tap   no n/v   no abdominal pain  High ostomy output - SICU to replace volume prn       Pt on lomotil, Imodium, & tincture of opium added  Awaiting Speech & Swallow eval  No f/c/s  no cp/palp/ sob/dyspnea/ cough/ abdominal pain  Tolerating TPN  monitor HR, metop adjustment as needed  cont antibiotics to complete course       leukocytosis improving off abx        Diet:  Diet, Dysphagia 1 Pureed-Nectar Consistency Fluid (12-21-19 @ 15:44)  Diet, NPO after Midnight:      NPO Start Date: 22-Dec-2019,   NPO Start Time: 23:59 (12-22-19 @ 19:00)      Appetite: [x  ]Poor [  ]Adequate [  ]Good  Caloric intake:  [   ]  Adequate   [ x] Inadequate    ROS: General/ GI see HPI  all other systems negative      ALLERGIES & MEDICATIONS  --------------------------------------------------------------------------------  No Known Allergies    STANDING INPATIENT MEDICATIONS    acyclovir   Oral Tab/Cap 400 milliGRAM(s) Oral three times a day  albuterol/ipratropium for Nebulization 3 milliLiter(s) Nebulizer every 6 hours  buDESOnide    Inhalation Suspension 0.5 milliGRAM(s) Inhalation every 12 hours  chlorhexidine 2% Cloths 1 Application(s) Topical <User Schedule>  diphenoxylate/atropine 2 Tablet(s) Oral <User Schedule>  enoxaparin Injectable 40 milliGRAM(s) SubCutaneous daily  fat emulsion (Fish Oil and Plant Based) 20% Infusion 20.8 mL/Hr IV Continuous <Continuous>  insulin lispro (HumaLOG) corrective regimen sliding scale   SubCutaneous every 6 hours  lactated ringers. 1000 milliLiter(s) IV Continuous <Continuous>  loperamide 4 milliGRAM(s) Oral <User Schedule>  metoprolol tartrate 12.5 milliGRAM(s) Oral every 6 hours  opium Tincture 6 milliGRAM(s) Oral <User Schedule>  Parenteral Nutrition - Adult 1 Each TPN Continuous <Continuous>  Parenteral Nutrition - Adult 1 Each TPN Continuous <Continuous>      PRN INPATIENT MEDICATION  acetaminophen  IVPB .. 750 milliGRAM(s) IV Intermittent once PRN  ondansetron Injectable 4 milliGRAM(s) IV Push every 6 hours PRN        VITALS/PHYSICAL EXAM  --------------------------------------------------------------------------------  T(C): 37.2 (12-23-19 @ 11:00), Max: 37.7 (12-22-19 @ 15:00)  HR: 90 (12-23-19 @ 13:00) (86 - 909)  BP: 88/53 (12-23-19 @ 13:00) (88/53 - 166/74)  RR: 21 (12-23-19 @ 13:00) (19 - 34)  SpO2: 97% (12-23-19 @ 13:00) (91% - 100%)  Wt(kg): --        12-22-19 @ 07:01  -  12-23-19 @ 07:00  --------------------------------------------------------  IN: 7838.8 mL / OUT: 6000 mL / NET: 1838.8 mL    12-23-19 @ 07:01  -  12-23-19 @ 14:54  --------------------------------------------------------  IN: 1250 mL / OUT: 1200 mL / NET: 50 mL      PHYSICAL EXAM  --------------------------------------------------------------------------------  	Gen: guarded but stable, A&Ox3  	HEENT: NC/AT, PERRL, supple neck, clear oropharynx, painful oral blisters  	Pulm: decreased BS B/L, Lt sided pigtail  	GI: (+) BS, softly distended, non tender,                    midline incision w/staples c/d/i w/o s/sx infection                   (+)ostomy pink viable- thick bilious liquid stool like material                   LLQ JOVANNA w/ serosanguinous drainage              MSK: FROM, no contractures nor deformities  	Vascular: Equally Warm, no clubbing, cyanosis, nor edema                        Rt PICC dressing c/d/I   	Neuro: No focal deficits, intact sensation, weakened strength  	Psych: Normal affect and mood  	Skin: Warm, without rashes, good turgor        LABS/ CULTURES/ RADIOLOGY:              8.1    9.44  >-----------<  411      [12-23-19 @ 00:50]              25.0     136  |  102  |  18  ----------------------------<  93      [12-23-19 @ 12:20]  4.7   |  26  |  0.48        Ca     7.9     [12-23-19 @ 12:20]      Mg     2.0     [12-23-19 @ 12:20]      Phos  3.5     [12-23-19 @ 12:20]    Blood Gas Calcium, Ionized - Venous: 1.10 mmoL/L (12.21.19 @ 13:33)  Blood Gas Arterial - Calcium, Ionized: 1.10 mmoL/L (12.15.19 @ 22:22)      TPro  5.4  /  Alb  2.0  /  TBili  0.4  /  DBili  0.2  /  AST  26  /  ALT  37  /  AlkPhos  124  [12-23-19 @ 00:50]    PT/INR: PT 12.4 , INR 1.08       [12-23-19 @ 02:31]  PTT: 29.3       [12-23-19 @ 00:50]    Serum Osmolality 288      [12-22-19 @ 00:35]    CAPILLARY BLOOD GLUCOSE  POCT Blood Glucose.: 104 mg/dL (23 Dec 2019 11:29)  POCT Blood Glucose.: 95 mg/dL (23 Dec 2019 05:55)  POCT Blood Glucose.: 36 mg/dL (23 Dec 2019 05:54)  POCT Blood Glucose.: 129 mg/dL (22 Dec 2019 18:25)    Prealbumin, Serum: 14 mg/dL (12-23-19 @ 06:28)  Prealbumin, Serum: 15 mg/dL (12-20-19 @ 02:49)  Prealbumin, Serum: 5 mg/dL (12-13-19 @ 07:46)    Triglycerides, Serum: 73 mg/dL (12.23.19 @ 00:50)  Triglycerides, Serum: 107 mg/dL (12.20.19 @ 01:04)  Triglycerides, Serum: 131 mg/dL (12.17.19 @ 00:47)      < from: Xray Chest 1 View- PORTABLE-Routine (12.23.19 @ 07:07) >  FINDINGS:    Right-sided PICC tip remains in the SVC. Left-sided chest tube remains.    The heart size is normal.    Left basilar hazy opacity representing effusion with atelectasis as seen in previous CT chest dated 12/21/2019. No pneumothorax.    IMPRESSION:    Left basilar hazy opacity representing effusion with atelectasis as seen in previous CT chest dated 12/21/2019.

## 2019-12-23 NOTE — PROGRESS NOTE ADULT - ASSESSMENT
Body Location Override (Optional - Billing Will Still Be Based On Selected Body Map Location If Applicable): Right Superior Preauricular Cheek Detail Level: Detailed 74M s/p ex lap, MIRYAM, closure of enterotomy, abthera vac placement 12/8 early morning. RTOR for exlap/washout 12/9. s/p RTOR ex lap, resection of 150cm bowel, creation of colostomy. Course c/b spontaneous PTX s/p pigtail 12/15, pleuravac with known air leak.     Plan   - evaluated by speech and swallow who suggested dysphagia 1 necator diet and FEES today   - c/w imodium, lomotil, and opium tincture, monitor ostomy output   - c/w 1:1 ostomy repletions    - TPN  - d/c JOVANNA drain today  - appreciate excellent SICU care    RED SURGERY  [id number] Add [id number] Cpt? (Important Note: In 2017 The Use Of 52981 Is Being Tracked By Cms To Determine Future Global Period Reimbursement For Global Periods): yes

## 2019-12-23 NOTE — PROGRESS NOTE ADULT - ATTENDING COMMENTS
Pt seen and examined with residents and PA, agree with above.    1. High-output ileostomy due to short gut syndrome:  - Continue lomotil, imodium, and tincture of opium  - Ostomy output decreased when NPO  - Continue TPN  - PPI to decrease gastric secretions  - Continue 1/1 replacements of ostomy output to prevent hypovolemia and CHI    2. L PTX s/p pleural pigtail catheter, air leak resolved, now with L pleural effusion with LLL consolidation on my personal review of CT chest and today's CXR:  - Appreciate Thoracic input, will try to arrange IR for image-guided drain placement to re-expand LLL    3. Oral HSV lesions:  - Acyclovir

## 2019-12-23 NOTE — PROGRESS NOTE ADULT - SUBJECTIVE AND OBJECTIVE BOX
Subjective: Patient seen and examined. No new events except as noted.   Remains in ICU   No cp or sob   Lip is hurting and dry       REVIEW OF SYSTEMS:    CONSTITUTIONAL: +weakness, fevers or chills  EYES/ENT: No visual changes;  No vertigo or throat pain   NECK: No pain or stiffness  RESPIRATORY: No cough, wheezing, hemoptysis; No shortness of breath  CARDIOVASCULAR: No chest pain or palpitations  GASTROINTESTINAL: No abdominal or epigastric pain. No nausea, vomiting, or hematemesis; No diarrhea or constipation. No melena or hematochezia.  GENITOURINARY: No dysuria, frequency or hematuria  NEUROLOGICAL: No numbness or weakness  SKIN:+ itching, burning, rashes, or lesions   All other review of systems is negative unless indicated above.    MEDICATIONS:  MEDICATIONS  (STANDING):  acyclovir   Oral Tab/Cap 400 milliGRAM(s) Oral three times a day  albuterol/ipratropium for Nebulization 3 milliLiter(s) Nebulizer every 6 hours  buDESOnide    Inhalation Suspension 0.5 milliGRAM(s) Inhalation every 12 hours  chlorhexidine 2% Cloths 1 Application(s) Topical <User Schedule>  diphenoxylate/atropine 2 Tablet(s) Oral <User Schedule>  enoxaparin Injectable 40 milliGRAM(s) SubCutaneous daily  fat emulsion (Fish Oil and Plant Based) 20% Infusion 20.8 mL/Hr (20.8 mL/Hr) IV Continuous <Continuous>  insulin lispro (HumaLOG) corrective regimen sliding scale   SubCutaneous every 6 hours  lactated ringers. 1000 milliLiter(s) (10 mL/Hr) IV Continuous <Continuous>  loperamide 4 milliGRAM(s) Oral <User Schedule>  metoprolol tartrate 12.5 milliGRAM(s) Oral every 6 hours  opium Tincture 6 milliGRAM(s) Oral <User Schedule>  pantoprazole  Injectable 40 milliGRAM(s) IV Push once  Parenteral Nutrition - Adult 1 Each (100 mL/Hr) TPN Continuous <Continuous>      PHYSICAL EXAM:  T(C): 37.2 (12-23-19 @ 11:00), Max: 37.7 (12-22-19 @ 15:00)  HR: 96 (12-23-19 @ 11:00) (86 - 104)  BP: 104/55 (12-23-19 @ 11:00) (94/50 - 166/74)  RR: 30 (12-23-19 @ 11:00) (19 - 36)  SpO2: 99% (12-23-19 @ 11:00) (91% - 100%)  Wt(kg): --  I&O's Summary    22 Dec 2019 07:01  -  23 Dec 2019 07:00  --------------------------------------------------------  IN: 7838.8 mL / OUT: 6000 mL / NET: 1838.8 mL    23 Dec 2019 07:01  -  23 Dec 2019 11:44  --------------------------------------------------------  IN: 440 mL / OUT: 1200 mL / NET: -760 mL            Appearance: NAD   HEENT:   Dry oral mucosa, crusted lips   Lymphatic: No lymphadenopathy   Cardiovascular: Normal S1 S2, No JVD, No murmurs , Peripheral pulses palpable 2+ bilaterally  Respiratory: Decreased bs   Gastrointestinal:  Soft, NT, ND. Ostomy pink with output. Midline staples in place, midline incision site is c/d/i   Skin: No rashes, No ecchymoses, No cyanosis, warm to touch  Musculoskeletal: Decreased  range of motion and strength  Psychiatry:  mildly sedated   Ext: No edema +PICC line   +rosas   +rectal tube       LABS:    CARDIAC MARKERS:                                8.1    9.44  )-----------( 411      ( 23 Dec 2019 00:50 )             25.0     12-23    136  |  104  |  19  ----------------------------<  128<H>  4.8   |  24  |  0.52    Ca    7.7<L>      23 Dec 2019 00:50  Phos  3.0     12-23  Mg     2.2     12-23    TPro  5.4<L>  /  Alb  2.0<L>  /  TBili  0.4  /  DBili  0.2  /  AST  26  /  ALT  37  /  AlkPhos  124<H>  12-23    proBNP:   Lipid Profile:   HgA1c:   TSH:             TELEMETRY: 	 SR   ECG:  	  RADIOLOGY: < from: Xray Chest 1 View- PORTABLE-Routine (12.23.19 @ 07:07) >    EXAM:  XR CHEST PORTABLE ROUTINE 1V                            PROCEDURE DATE:  12/23/2019            INTERPRETATION:  CLINICAL INFORMATION: Left chest tube placement.    EXAM: AP portable chest     COMPARISON: Chest radiograph from 12/22/2019.    FINDINGS:    Right-sided PICC tip remains in the SVC. Left-sided chest tube remains.    The heart size is normal.    Left basilar hazy opacity representing effusion with atelectasis as seen in previous CT chest dated 12/21/2019. No pneumothorax.    IMPRESSION:    Left basilar hazy opacity representing effusion with atelectasis as seen in previous CT chest dated 12/21/2019.                     CUCO STEINBERG M.D., RADIOLOGY RESIDENT  This document has been electronically signed.  TRUDI DIETZ M.D., ATTENDING RADIOLOGIST  This document has been electronically signed. Dec 23 2019  9:31AM                < end of copied text >    DIAGNOSTIC TESTING:  [ ] Echocardiogram:  [ ]  Catheterization:  [ ] Stress Test:    OTHER:

## 2019-12-23 NOTE — PROGRESS NOTE ADULT - SUBJECTIVE AND OBJECTIVE BOX
HISTORY  74y Male COPD and EtOH dependence who presented 12/6 with abdominal pain, nausea, hematemesis, and poor PO intake for ~2-3 days. Labs significant for an CHI w/ Cr 2.74 and lactate of 9.4. He was also notably tachycardic to the 110s and hypotensive w/ SBP in the 70s. He was given 2 units of PRBCs and 2 L of LR in the ED due to concern for upper GI bleeding. Imaging revealed high grade SBO in the RLQ. Patient was taken to the OR emergently for an exploratory laparotomy, lysis of adhesions, decompression of bowel via enterotomy w/ primary repair, and Abthera VAC placement. Of note, the distal 50% of the bowel appeared dusky but viable. He required vasopressor support with phenylephrine and vasopressin infusions. He received 3000 mL of crystalloid w/ EBL of 10 mL and UOP of 25 mL. Patient was left intubated at the end of the case so SICU was consulted for hemodynamic monitoring. Taken back to the OR on 12/8 and underwent take down of Abthera, washout and re-application of the Abthera vac. Went back to OR on 12/10 night for colectomy.        24 HOUR EVENTS:  - Continues to have high ileostomy output  - Loperamide dose currently 4mg Q6  - Hyponatremia improved, fluid replacements changed from NS to LR  - Chest tube on waterseal with no evidence of leak  - CT surgery recommending IR L chest tap     SUBJECTIVE/ROS:  [x] A ten-point review of systems was otherwise negative except as noted.  [ ] Due to altered mental status/intubation, subjective information were not able to be obtained from the patient. History was obtained, to the extent possible, from review of the chart and collateral sources of information.      NEURO  Exam: awake, alert, oriented  Meds: acetaminophen  IVPB .. 750 milliGRAM(s) IV Intermittent once PRN Mild Pain (1 - 3)  ondansetron Injectable 4 milliGRAM(s) IV Push every 6 hours PRN Nausea and/or Vomiting  opium Tincture 6 milliGRAM(s) Oral <User Schedule>    [x] Adequacy of sedation and pain control has been assessed and adjusted      RESPIRATORY  RR: 23 (12-23-19 @ 00:00) (21 - 44)  SpO2: 97% (12-23-19 @ 00:00) (96% - 100%)  Wt(kg): --  Exam: unlabored, clear to auscultation bilaterally      Meds: albuterol/ipratropium for Nebulization 3 milliLiter(s) Nebulizer every 6 hours  buDESOnide    Inhalation Suspension 0.5 milliGRAM(s) Inhalation every 12 hours        CARDIOVASCULAR  HR: 92 (12-23-19 @ 00:00) (88 - 104)  BP: 102/55 (12-23-19 @ 00:00) (90/54 - 139/63)  BP(mean): 74 (12-23-19 @ 00:00) (66 - 90)  ABP: --  ABP(mean): --  Wt(kg): --  CVP(cm H2O): --  VBG - ( 21 Dec 2019 13:33 )  pH: 7.43  /  pCO2: 49    /  pO2: 46    / HCO3: 32    / Base Excess: 7.6   /  SaO2: 75     Lactate: 0.7                Exam: regular rate and rhythm  Cardiac Rhythm: sinus  Perfusion     [x]Adequate   [ ]Inadequate  Mentation   [x]Normal       [ ]Reduced  Extremities  [x]Warm         [ ]Cool  Volume Status [ ]Hypervolemic [x]Euvolemic [ ]Hypovolemic  Meds: metoprolol tartrate 12.5 milliGRAM(s) Oral every 6 hours        GI/NUTRITION  Exam: soft, nontender, nondistended, incision C/D/I  Diet: Dysphagia 1 diet and TPN  Meds: diphenoxylate/atropine 2 Tablet(s) Oral <User Schedule>  loperamide 4 milliGRAM(s) Oral <User Schedule>      GENITOURINARY  I&O's Detail    12-21 @ 07:01  -  12-22 @ 07:00  --------------------------------------------------------  IN:    fat emulsion (Fish Oil and Plant Based) 20% Infusion: 270.4 mL    sodium chloride 0.9%: 3899 mL    TPN (Total Parenteral Nutrition): 2400 mL  Total IN: 6569.4 mL    OUT:    Ileostomy: 4500 mL    Incontinent per Condom Catheter: 1200 mL  Total OUT: 5700 mL    Total NET: 869.4 mL      12-22 @ 07:01  -  12-23 @ 01:46  --------------------------------------------------------  IN:    fat emulsion (Fish Oil and Plant Based) 20% Infusion: 124.8 mL    Oral Fluid: 1060 mL    sodium chloride 0.9%: 3490 mL    TPN (Total Parenteral Nutrition): 1700 mL  Total IN: 6374.8 mL    OUT:    Ileostomy: 3700 mL    Incontinent per Condom Catheter: 1000 mL  Total OUT: 4700 mL    Total NET: 1674.8 mL          12-23    136  |  104  |  19  ----------------------------<  128<H>  4.8   |  24  |  0.52    Ca    7.7<L>      23 Dec 2019 00:50  Phos  3.0     12-23  Mg     2.2     12-23    TPro  5.4<L>  /  Alb  2.0<L>  /  TBili  0.4  /  DBili  0.2  /  AST  26  /  ALT  37  /  AlkPhos  124<H>  12-23    [ ] Martinez catheter, indication: N/A  Meds: fat emulsion (Fish Oil and Plant Based) 20% Infusion 20.8 mL/Hr IV Continuous <Continuous>  lactated ringers. 1000 milliLiter(s) IV Continuous <Continuous>  Parenteral Nutrition - Adult 1 Each TPN Continuous <Continuous>        HEMATOLOGIC  Meds: enoxaparin Injectable 40 milliGRAM(s) SubCutaneous daily    [x] VTE Prophylaxis                        8.1    9.44  )-----------( 411      ( 23 Dec 2019 00:50 )             25.0     PTT - ( 23 Dec 2019 00:50 )  PTT:29.3 sec  Transfusion     [ ] PRBC   [ ] Platelets   [ ] FFP   [ ] Cryoprecipitate      INFECTIOUS DISEASES  WBC Count: 9.44 K/uL (12-23 @ 00:50)    RECENT CULTURES:    Meds:       ENDOCRINE  CAPILLARY BLOOD GLUCOSE      POCT Blood Glucose.: 129 mg/dL (22 Dec 2019 18:25)  POCT Blood Glucose.: 126 mg/dL (22 Dec 2019 12:33)  POCT Blood Glucose.: 116 mg/dL (22 Dec 2019 06:26)    Meds: insulin lispro (HumaLOG) corrective regimen sliding scale   SubCutaneous every 6 hours        ACCESS DEVICES:  [x Peripheral IV  [ ] Central Venous Line	[ ] R	[ ] L	[ ] IJ	[ ] Fem	[ ] SC	Placed:   [ ] Arterial Line		[ ] R	[ ] L	[ ] Fem	[ ] Rad	[ ] Ax	Placed:   [ ] PICC:					[ ] Mediport  [ ] Urinary Catheter, Date Placed:   [x] Necessity of urinary, arterial, and venous catheters discussed    OTHER MEDICATIONS:  chlorhexidine 2% Cloths 1 Application(s) Topical <User Schedule>      CODE STATUS:      IMAGING:      EXAM:  CT CHEST                            PROCEDURE DATE:  12/21/2019            INTERPRETATION:  CLINICAL INFORMATION: Persistent pneumothorax, evaluate.Status post enterotomy for small bowel obstruction, small bowel resection,  colectomy, and end ileostomy.    COMPARISON: Chest x-ray from 12/21/2019..    PROCEDURE:   CT of the Chest was performed without intravenous contrast.  Sagittal and coronal reformats were performed.    FINDINGS:    LUNGS AND AIRWAYS: Debris in the trachea. Patchy airspace opacities in the right lower lobe. Partial compressive atelectasis of the left lower lobe, and mild subsegmental atelectasis of the right lower lobe. Emphysema.     PLEURA: Left-sided chest tube in place. Trace left apical pneumothorax. Moderate left and small right pleural effusions.     MEDIASTINUM AND MIKI: No lymphadenopathy.    VESSELS: Atherosclerotic calcifications.    HEART: Heart size is normal. No pericardial effusion.    CHEST WALL AND LOWER NECK: Within normal limits.    VISUALIZED UPPER ABDOMEN: Within normal limits.    BONES: Degenerative changes.     IMPRESSION:   Left-sided chest tube in place with trace left apical pneumothorax.   Patchy airspace opacities in the right lower lobe, differential favors distal mucoid impacted airways versus pneumonia.  Moderate left and small right pleural effusions with mild subsegmental atelectasis of the right lower lobe.                    LATONIA GARCIA M.D., RADIOLOGY RESIDENT  This document has been electronically signed.  BAKARI GREENWOOD M.D., ATTENDING RADIOLOGIST  This document has been electronically signed. Dec 22 2019 10:25AM HISTORY  74y Male COPD and EtOH dependence who presented 12/6 with abdominal pain, nausea, hematemesis, and poor PO intake for ~2-3 days. Labs significant for an CHI w/ Cr 2.74 and lactate of 9.4. He was also notably tachycardic to the 110s and hypotensive w/ SBP in the 70s. He was given 2 units of PRBCs and 2 L of LR in the ED due to concern for upper GI bleeding. Imaging revealed high grade SBO in the RLQ. Patient was taken to the OR emergently for an exploratory laparotomy, lysis of adhesions, decompression of bowel via enterotomy w/ primary repair, and Abthera VAC placement. Of note, the distal 50% of the bowel appeared dusky but viable. He required vasopressor support with phenylephrine and vasopressin infusions. He received 3000 mL of crystalloid w/ EBL of 10 mL and UOP of 25 mL. Patient was left intubated at the end of the case so SICU was consulted for hemodynamic monitoring. Taken back to the OR on 12/8 and underwent take down of Abthera, washout and re-application of the Abthera vac. Went back to OR on 12/10 night for colectomy.        24 HOUR EVENTS:  - Continues to have high ileostomy output  - Loperamide dose currently 4mg Q6  - Hyponatremia improved, fluid replacements changed from NS to LR  - Chest tube on waterseal with no evidence of leak  - CT surgery recommending IR L chest tap     SUBJECTIVE/ROS:  [x] A ten-point review of systems was otherwise negative except as noted.  [ ] Due to altered mental status/intubation, subjective information were not able to be obtained from the patient. History was obtained, to the extent possible, from review of the chart and collateral sources of information.    GENERAL: no distress    NEURO  Exam: awake, alert, oriented  Meds: acetaminophen  IVPB .. 750 milliGRAM(s) IV Intermittent once PRN Mild Pain (1 - 3)  ondansetron Injectable 4 milliGRAM(s) IV Push every 6 hours PRN Nausea and/or Vomiting  opium Tincture 6 milliGRAM(s) Oral <User Schedule>    [x] Adequacy of sedation and pain control has been assessed and adjusted      RESPIRATORY  RR: 23 (12-23-19 @ 00:00) (21 - 44)  SpO2: 97% (12-23-19 @ 00:00) (96% - 100%)  Wt(kg): --  Exam: unlabored, clear to auscultation bilaterally      Meds: albuterol/ipratropium for Nebulization 3 milliLiter(s) Nebulizer every 6 hours  buDESOnide    Inhalation Suspension 0.5 milliGRAM(s) Inhalation every 12 hours        CARDIOVASCULAR  HR: 92 (12-23-19 @ 00:00) (88 - 104)  BP: 102/55 (12-23-19 @ 00:00) (90/54 - 139/63)  BP(mean): 74 (12-23-19 @ 00:00) (66 - 90)  ABP: --  ABP(mean): --  Wt(kg): --  CVP(cm H2O): --  VBG - ( 21 Dec 2019 13:33 )  pH: 7.43  /  pCO2: 49    /  pO2: 46    / HCO3: 32    / Base Excess: 7.6   /  SaO2: 75     Lactate: 0.7      Exam: regular rate and rhythm  Cardiac Rhythm: sinus  Perfusion     [x]Adequate   [ ]Inadequate  Mentation   [x]Normal       [ ]Reduced  Extremities  [x]Warm         [ ]Cool  Volume Status [ ]Hypervolemic [x]Euvolemic [ ]Hypovolemic  Meds: metoprolol tartrate 12.5 milliGRAM(s) Oral every 6 hours        GI/NUTRITION  Exam: soft, nontender, nondistended, incision C/D/I  Diet: Dysphagia 1 diet and TPN  Meds: diphenoxylate/atropine 2 Tablet(s) Oral <User Schedule>  loperamide 4 milliGRAM(s) Oral <User Schedule>      GENITOURINARY  I&O's Detail    12-21 @ 07:01  -  12-22 @ 07:00  --------------------------------------------------------  IN:    fat emulsion (Fish Oil and Plant Based) 20% Infusion: 270.4 mL    sodium chloride 0.9%: 3899 mL    TPN (Total Parenteral Nutrition): 2400 mL  Total IN: 6569.4 mL    OUT:    Ileostomy: 4500 mL    Incontinent per Condom Catheter: 1200 mL  Total OUT: 5700 mL    Total NET: 869.4 mL      12-22 @ 07:01  -  12-23 @ 01:46  --------------------------------------------------------  IN:    fat emulsion (Fish Oil and Plant Based) 20% Infusion: 124.8 mL    Oral Fluid: 1060 mL    sodium chloride 0.9%: 3490 mL    TPN (Total Parenteral Nutrition): 1700 mL  Total IN: 6374.8 mL    OUT:    Ileostomy: 3700 mL    Incontinent per Condom Catheter: 1000 mL  Total OUT: 4700 mL    Total NET: 1674.8 mL          12-23    136  |  104  |  19  ----------------------------<  128<H>  4.8   |  24  |  0.52    Ca    7.7<L>      23 Dec 2019 00:50  Phos  3.0     12-23  Mg     2.2     12-23    TPro  5.4<L>  /  Alb  2.0<L>  /  TBili  0.4  /  DBili  0.2  /  AST  26  /  ALT  37  /  AlkPhos  124<H>  12-23    [ ] Martinez catheter, indication: N/A  Meds: fat emulsion (Fish Oil and Plant Based) 20% Infusion 20.8 mL/Hr IV Continuous <Continuous>  lactated ringers. 1000 milliLiter(s) IV Continuous <Continuous>  Parenteral Nutrition - Adult 1 Each TPN Continuous <Continuous>        HEMATOLOGIC  Meds: enoxaparin Injectable 40 milliGRAM(s) SubCutaneous daily    [x] VTE Prophylaxis                        8.1    9.44  )-----------( 411      ( 23 Dec 2019 00:50 )             25.0     PTT - ( 23 Dec 2019 00:50 )  PTT:29.3 sec  Transfusion     [ ] PRBC   [ ] Platelets   [ ] FFP   [ ] Cryoprecipitate      INFECTIOUS DISEASES  WBC Count: 9.44 K/uL (12-23 @ 00:50)    RECENT CULTURES:    Meds:       ENDOCRINE  CAPILLARY BLOOD GLUCOSE      POCT Blood Glucose.: 129 mg/dL (22 Dec 2019 18:25)  POCT Blood Glucose.: 126 mg/dL (22 Dec 2019 12:33)  POCT Blood Glucose.: 116 mg/dL (22 Dec 2019 06:26)    Meds: insulin lispro (HumaLOG) corrective regimen sliding scale   SubCutaneous every 6 hours        ACCESS DEVICES:  [x Peripheral IV  [ ] Central Venous Line	[ ] R	[ ] L	[ ] IJ	[ ] Fem	[ ] SC	Placed:   [ ] Arterial Line		[ ] R	[ ] L	[ ] Fem	[ ] Rad	[ ] Ax	Placed:   [ ] PICC:					[ ] Mediport  [ ] Urinary Catheter, Date Placed:   [x] Necessity of urinary, arterial, and venous catheters discussed    OTHER MEDICATIONS:  chlorhexidine 2% Cloths 1 Application(s) Topical <User Schedule>      CODE STATUS:      IMAGING:      EXAM:  CT CHEST                            PROCEDURE DATE:  12/21/2019            INTERPRETATION:  CLINICAL INFORMATION: Persistent pneumothorax, evaluate.Status post enterotomy for small bowel obstruction, small bowel resection,  colectomy, and end ileostomy.    COMPARISON: Chest x-ray from 12/21/2019..    PROCEDURE:   CT of the Chest was performed without intravenous contrast.  Sagittal and coronal reformats were performed.    FINDINGS:    LUNGS AND AIRWAYS: Debris in the trachea. Patchy airspace opacities in the right lower lobe. Partial compressive atelectasis of the left lower lobe, and mild subsegmental atelectasis of the right lower lobe. Emphysema.     PLEURA: Left-sided chest tube in place. Trace left apical pneumothorax. Moderate left and small right pleural effusions.     MEDIASTINUM AND MIKI: No lymphadenopathy.    VESSELS: Atherosclerotic calcifications.    HEART: Heart size is normal. No pericardial effusion.    CHEST WALL AND LOWER NECK: Within normal limits.    VISUALIZED UPPER ABDOMEN: Within normal limits.    BONES: Degenerative changes.     IMPRESSION:   Left-sided chest tube in place with trace left apical pneumothorax.   Patchy airspace opacities in the right lower lobe, differential favors distal mucoid impacted airways versus pneumonia.  Moderate left and small right pleural effusions with mild subsegmental atelectasis of the right lower lobe.                    LATONIA GARCIA M.D., RADIOLOGY RESIDENT  This document has been electronically signed.  BAKARI GREENWOOD M.D., ATTENDING RADIOLOGIST  This document has been electronically signed. Dec 22 2019 10:25AM

## 2019-12-24 LAB
ANION GAP SERPL CALC-SCNC: 8 MMOL/L — SIGNIFICANT CHANGE UP (ref 5–17)
ANION GAP SERPL CALC-SCNC: 8 MMOL/L — SIGNIFICANT CHANGE UP (ref 5–17)
BUN SERPL-MCNC: 17 MG/DL — SIGNIFICANT CHANGE UP (ref 7–23)
BUN SERPL-MCNC: 18 MG/DL — SIGNIFICANT CHANGE UP (ref 7–23)
CALCIUM SERPL-MCNC: 7.9 MG/DL — LOW (ref 8.4–10.5)
CALCIUM SERPL-MCNC: 8 MG/DL — LOW (ref 8.4–10.5)
CHLORIDE SERPL-SCNC: 100 MMOL/L — SIGNIFICANT CHANGE UP (ref 96–108)
CHLORIDE SERPL-SCNC: 98 MMOL/L — SIGNIFICANT CHANGE UP (ref 96–108)
CO2 SERPL-SCNC: 25 MMOL/L — SIGNIFICANT CHANGE UP (ref 22–31)
CO2 SERPL-SCNC: 26 MMOL/L — SIGNIFICANT CHANGE UP (ref 22–31)
CREAT SERPL-MCNC: 0.5 MG/DL — SIGNIFICANT CHANGE UP (ref 0.5–1.3)
CREAT SERPL-MCNC: 0.55 MG/DL — SIGNIFICANT CHANGE UP (ref 0.5–1.3)
GLUCOSE BLDC GLUCOMTR-MCNC: 108 MG/DL — HIGH (ref 70–99)
GLUCOSE BLDC GLUCOMTR-MCNC: 114 MG/DL — HIGH (ref 70–99)
GLUCOSE BLDC GLUCOMTR-MCNC: 118 MG/DL — HIGH (ref 70–99)
GLUCOSE BLDC GLUCOMTR-MCNC: 118 MG/DL — HIGH (ref 70–99)
GLUCOSE SERPL-MCNC: 109 MG/DL — HIGH (ref 70–99)
GLUCOSE SERPL-MCNC: 122 MG/DL — HIGH (ref 70–99)
HCT VFR BLD CALC: 25.3 % — LOW (ref 39–50)
HGB BLD-MCNC: 8.1 G/DL — LOW (ref 13–17)
MAGNESIUM SERPL-MCNC: 1.8 MG/DL — SIGNIFICANT CHANGE UP (ref 1.6–2.6)
MAGNESIUM SERPL-MCNC: 1.9 MG/DL — SIGNIFICANT CHANGE UP (ref 1.6–2.6)
MCHC RBC-ENTMCNC: 30 PG — SIGNIFICANT CHANGE UP (ref 27–34)
MCHC RBC-ENTMCNC: 32 GM/DL — SIGNIFICANT CHANGE UP (ref 32–36)
MCV RBC AUTO: 93.7 FL — SIGNIFICANT CHANGE UP (ref 80–100)
NRBC # BLD: 0 /100 WBCS — SIGNIFICANT CHANGE UP (ref 0–0)
PHOSPHATE SERPL-MCNC: 3.7 MG/DL — SIGNIFICANT CHANGE UP (ref 2.5–4.5)
PHOSPHATE SERPL-MCNC: 4.1 MG/DL — SIGNIFICANT CHANGE UP (ref 2.5–4.5)
PLATELET # BLD AUTO: 412 K/UL — HIGH (ref 150–400)
POTASSIUM SERPL-MCNC: 4.7 MMOL/L — SIGNIFICANT CHANGE UP (ref 3.5–5.3)
POTASSIUM SERPL-MCNC: 4.8 MMOL/L — SIGNIFICANT CHANGE UP (ref 3.5–5.3)
POTASSIUM SERPL-SCNC: 4.7 MMOL/L — SIGNIFICANT CHANGE UP (ref 3.5–5.3)
POTASSIUM SERPL-SCNC: 4.8 MMOL/L — SIGNIFICANT CHANGE UP (ref 3.5–5.3)
PREALB SERPL-MCNC: 14 MG/DL — LOW (ref 20–40)
RBC # BLD: 2.7 M/UL — LOW (ref 4.2–5.8)
RBC # FLD: 13.5 % — SIGNIFICANT CHANGE UP (ref 10.3–14.5)
SODIUM SERPL-SCNC: 132 MMOL/L — LOW (ref 135–145)
SODIUM SERPL-SCNC: 133 MMOL/L — LOW (ref 135–145)
TRIGL SERPL-MCNC: 69 MG/DL — SIGNIFICANT CHANGE UP (ref 10–149)
WBC # BLD: 9.4 K/UL — SIGNIFICANT CHANGE UP (ref 3.8–10.5)
WBC # FLD AUTO: 9.4 K/UL — SIGNIFICANT CHANGE UP (ref 3.8–10.5)

## 2019-12-24 PROCEDURE — 93010 ELECTROCARDIOGRAM REPORT: CPT

## 2019-12-24 PROCEDURE — 99232 SBSQ HOSP IP/OBS MODERATE 35: CPT

## 2019-12-24 PROCEDURE — 99231 SBSQ HOSP IP/OBS SF/LOW 25: CPT

## 2019-12-24 PROCEDURE — 71045 X-RAY EXAM CHEST 1 VIEW: CPT | Mod: 26

## 2019-12-24 PROCEDURE — 71045 X-RAY EXAM CHEST 1 VIEW: CPT | Mod: 26,77

## 2019-12-24 RX ORDER — INSULIN LISPRO 100/ML
VIAL (ML) SUBCUTANEOUS EVERY 6 HOURS
Refills: 0 | Status: DISCONTINUED | OUTPATIENT
Start: 2019-12-24 | End: 2019-12-26

## 2019-12-24 RX ORDER — MAGNESIUM SULFATE 500 MG/ML
2 VIAL (ML) INJECTION ONCE
Refills: 0 | Status: COMPLETED | OUTPATIENT
Start: 2019-12-24 | End: 2019-12-24

## 2019-12-24 RX ORDER — SODIUM CHLORIDE 9 MG/ML
1000 INJECTION INTRAMUSCULAR; INTRAVENOUS; SUBCUTANEOUS
Refills: 0 | Status: DISCONTINUED | OUTPATIENT
Start: 2019-12-24 | End: 2019-12-26

## 2019-12-24 RX ORDER — ELECTROLYTE SOLUTION,INJ
1 VIAL (ML) INTRAVENOUS
Refills: 0 | Status: DISCONTINUED | OUTPATIENT
Start: 2019-12-24 | End: 2019-12-24

## 2019-12-24 RX ORDER — OCTREOTIDE ACETATE 200 UG/ML
100 INJECTION, SOLUTION INTRAVENOUS; SUBCUTANEOUS THREE TIMES A DAY
Refills: 0 | Status: DISCONTINUED | OUTPATIENT
Start: 2019-12-24 | End: 2020-01-10

## 2019-12-24 RX ORDER — INSULIN LISPRO 100/ML
VIAL (ML) SUBCUTANEOUS AT BEDTIME
Refills: 0 | Status: DISCONTINUED | OUTPATIENT
Start: 2019-12-24 | End: 2019-12-25

## 2019-12-24 RX ORDER — I.V. FAT EMULSION 20 G/100ML
20.8 EMULSION INTRAVENOUS
Qty: 50 | Refills: 0 | Status: DISCONTINUED | OUTPATIENT
Start: 2019-12-24 | End: 2019-12-25

## 2019-12-24 RX ORDER — PANTOPRAZOLE SODIUM 20 MG/1
40 TABLET, DELAYED RELEASE ORAL DAILY
Refills: 0 | Status: DISCONTINUED | OUTPATIENT
Start: 2019-12-24 | End: 2020-01-10

## 2019-12-24 RX ORDER — LOPERAMIDE HCL 2 MG
8 TABLET ORAL
Refills: 0 | Status: DISCONTINUED | OUTPATIENT
Start: 2019-12-24 | End: 2019-12-25

## 2019-12-24 RX ADMIN — Medication 8 MILLIGRAM(S): at 17:19

## 2019-12-24 RX ADMIN — Medication 2 TABLET(S): at 12:24

## 2019-12-24 RX ADMIN — Medication 50 GRAM(S): at 14:31

## 2019-12-24 RX ADMIN — OCTREOTIDE ACETATE 100 MICROGRAM(S): 200 INJECTION, SOLUTION INTRAVENOUS; SUBCUTANEOUS at 22:48

## 2019-12-24 RX ADMIN — Medication 3 MILLILITER(S): at 17:29

## 2019-12-24 RX ADMIN — SODIUM CHLORIDE 10 MILLILITER(S): 9 INJECTION, SOLUTION INTRAVENOUS at 05:03

## 2019-12-24 RX ADMIN — SODIUM CHLORIDE 10 MILLILITER(S): 9 INJECTION, SOLUTION INTRAVENOUS at 08:14

## 2019-12-24 RX ADMIN — Medication 400 MILLIGRAM(S): at 22:48

## 2019-12-24 RX ADMIN — MORPHINE 6 MILLIGRAM(S): 10 SOLUTION ORAL at 22:48

## 2019-12-24 RX ADMIN — Medication 4 MILLIGRAM(S): at 12:24

## 2019-12-24 RX ADMIN — Medication 2 TABLET(S): at 22:48

## 2019-12-24 RX ADMIN — Medication 400 MILLIGRAM(S): at 05:04

## 2019-12-24 RX ADMIN — Medication 0.5 MILLIGRAM(S): at 05:52

## 2019-12-24 RX ADMIN — MORPHINE 6 MILLIGRAM(S): 10 SOLUTION ORAL at 12:24

## 2019-12-24 RX ADMIN — Medication 50 GRAM(S): at 05:03

## 2019-12-24 RX ADMIN — MORPHINE 6 MILLIGRAM(S): 10 SOLUTION ORAL at 08:14

## 2019-12-24 RX ADMIN — Medication 4 MILLIGRAM(S): at 08:14

## 2019-12-24 RX ADMIN — Medication 3 MILLILITER(S): at 05:50

## 2019-12-24 RX ADMIN — Medication 3 MILLILITER(S): at 11:18

## 2019-12-24 RX ADMIN — Medication 2 TABLET(S): at 17:24

## 2019-12-24 RX ADMIN — CHLORHEXIDINE GLUCONATE 1 APPLICATION(S): 213 SOLUTION TOPICAL at 05:05

## 2019-12-24 RX ADMIN — Medication 3 MILLILITER(S): at 01:02

## 2019-12-24 RX ADMIN — MORPHINE 6 MILLIGRAM(S): 10 SOLUTION ORAL at 17:53

## 2019-12-24 RX ADMIN — Medication 0.5 MILLIGRAM(S): at 17:29

## 2019-12-24 RX ADMIN — I.V. FAT EMULSION 20.8 ML/HR: 20 EMULSION INTRAVENOUS at 17:19

## 2019-12-24 RX ADMIN — PANTOPRAZOLE SODIUM 40 MILLIGRAM(S): 20 TABLET, DELAYED RELEASE ORAL at 12:24

## 2019-12-24 RX ADMIN — Medication 2 TABLET(S): at 08:14

## 2019-12-24 RX ADMIN — Medication 1 EACH: at 17:19

## 2019-12-24 RX ADMIN — Medication 400 MILLIGRAM(S): at 12:24

## 2019-12-24 RX ADMIN — Medication 8 MILLIGRAM(S): at 22:48

## 2019-12-24 NOTE — PROGRESS NOTE ADULT - PROBLEM SELECTOR PLAN 1
Dr Mayes CT imaging reviewed still moderate fluid to left lung and recommends asking IR to tap for drainage.  Will continue to follow  Care as per primary team

## 2019-12-24 NOTE — PROGRESS NOTE ADULT - SUBJECTIVE AND OBJECTIVE BOX
Subjective: Patient seen and examined. No new events except as noted.   remains in ICU   resting comfortably         REVIEW OF SYSTEMS:    CONSTITUTIONAL:+ weakness, fevers or chills  EYES/ENT: No visual changes;  No vertigo or throat pain   NECK: No pain or stiffness  RESPIRATORY: No cough, wheezing, hemoptysis; No shortness of breath  CARDIOVASCULAR: No chest pain or palpitations  GASTROINTESTINAL: No abdominal or epigastric pain. No nausea, vomiting, or hematemesis; No diarrhea or constipation. No melena or hematochezia.  GENITOURINARY: No dysuria, frequency or hematuria  NEUROLOGICAL: No numbness or weakness  SKIN: No itching, burning, rashes, or lesions   All other review of systems is negative unless indicated above.    MEDICATIONS:  MEDICATIONS  (STANDING):  acyclovir   Oral Tab/Cap 400 milliGRAM(s) Oral three times a day  albuterol/ipratropium for Nebulization 3 milliLiter(s) Nebulizer every 6 hours  buDESOnide    Inhalation Suspension 0.5 milliGRAM(s) Inhalation every 12 hours  chlorhexidine 2% Cloths 1 Application(s) Topical <User Schedule>  diphenoxylate/atropine 2 Tablet(s) Oral <User Schedule>  fat emulsion (Fish Oil and Plant Based) 20% Infusion 20.8 mL/Hr (20.8 mL/Hr) IV Continuous <Continuous>  insulin lispro (HumaLOG) corrective regimen sliding scale   SubCutaneous every 6 hours  insulin lispro (HumaLOG) corrective regimen sliding scale   SubCutaneous at bedtime  loperamide 4 milliGRAM(s) Oral <User Schedule>  metoprolol tartrate 12.5 milliGRAM(s) Oral every 6 hours  opium Tincture 6 milliGRAM(s) Oral <User Schedule>  pantoprazole  Injectable 40 milliGRAM(s) IV Push daily  Parenteral Nutrition - Adult 1 Each (100 mL/Hr) TPN Continuous <Continuous>  sodium chloride 0.9%. 1000 milliLiter(s) (10 mL/Hr) IV Continuous <Continuous>      PHYSICAL EXAM:  T(C): 37.4 (12-24-19 @ 07:00), Max: 37.4 (12-24-19 @ 07:00)  HR: 101 (12-24-19 @ 11:20) (90 - 109)  BP: 89/54 (12-24-19 @ 11:00) (88/53 - 136/69)  RR: 23 (12-24-19 @ 11:00) (21 - 41)  SpO2: 98% (12-24-19 @ 11:20) (94% - 100%)  Wt(kg): --  I&O's Summary    23 Dec 2019 07:01  -  24 Dec 2019 07:00  --------------------------------------------------------  IN: 5228.8 mL / OUT: 4400 mL / NET: 828.8 mL    24 Dec 2019 07:01  -  24 Dec 2019 12:03  --------------------------------------------------------  IN: 440 mL / OUT: 0 mL / NET: 440 mL            Appearance: NAD   HEENT:   Dry oral mucosa, crusted lips   Lymphatic: No lymphadenopathy   Cardiovascular: Normal S1 S2, No JVD, No murmurs , Peripheral pulses palpable 2+ bilaterally  Respiratory: Decreased bs   Gastrointestinal:  Soft, NT, ND. Ostomy pink with output. Midline staples in place, midline incision site is c/d/i   Skin: No rashes, No ecchymoses, No cyanosis, warm to touch  Musculoskeletal: Decreased  range of motion and strength  Psychiatry:  mildly sedated   Ext: No edema +PICC line   +rosas   +rectal tube           LABS:    CARDIAC MARKERS:                                8.1    9.40  )-----------( 412      ( 24 Dec 2019 01:10 )             25.3     12-24    133<L>  |  100  |  17  ----------------------------<  122<H>  4.8   |  25  |  0.55    Ca    8.0<L>      24 Dec 2019 01:10  Phos  3.7     12-24  Mg     1.8     12-24    TPro  5.4<L>  /  Alb  2.0<L>  /  TBili  0.4  /  DBili  0.2  /  AST  26  /  ALT  37  /  AlkPhos  124<H>  12-23    proBNP:   Lipid Profile:   HgA1c:   TSH:             TELEMETRY: 	SR    ECG:  	  RADIOLOGY: < from: CT Chest No Cont (12.21.19 @ 19:39) >    EXAM:  CT CHEST                            PROCEDURE DATE:  12/21/2019            INTERPRETATION:  CLINICAL INFORMATION: Persistent pneumothorax, evaluate.Status post enterotomy for small bowel obstruction, small bowel resection,  colectomy, and end ileostomy.    COMPARISON: Chest x-ray from 12/21/2019..    PROCEDURE:   CT of the Chest was performed without intravenous contrast.  Sagittal and coronal reformats were performed.    FINDINGS:    LUNGS AND AIRWAYS: Debris in the trachea. Patchy airspace opacities in the right lower lobe. Partial compressive atelectasis of the left lower lobe, and mild subsegmental atelectasis of the right lower lobe. Emphysema.     PLEURA: Left-sided chest tube in place. Trace left apical pneumothorax. Moderate left and small right pleural effusions.     MEDIASTINUM AND MIKI: No lymphadenopathy.    VESSELS: Atherosclerotic calcifications.    HEART: Heart size is normal. No pericardial effusion.    CHEST WALL AND LOWER NECK: Within normal limits.    VISUALIZED UPPER ABDOMEN: Within normal limits.    BONES: Degenerative changes.     IMPRESSION:   Left-sided chest tube in place with trace left apical pneumothorax.   Patchy airspace opacities in the right lower lobe, differential favors distal mucoid impacted airways versus pneumonia.  Moderate left and small right pleural effusions with mild subsegmental atelectasis of the right lower lobe.                    LATONIA GARCIA M.D., RADIOLOGY RESIDENT  This document has been electronically signed.  BAKARI GREENWOOD M.D., ATTENDING RADIOLOGIST  This document has been electronically signed. Dec 22 2019 10:25AM                < end of copied text >    DIAGNOSTIC TESTING:  [ ] Echocardiogram:  [ ]  Catheterization:  [ ] Stress Test:    OTHER:

## 2019-12-24 NOTE — PROGRESS NOTE ADULT - ASSESSMENT
73 y/o M presenting with septic shock and high grade SBO s/p exploratory laparotomy, lysis of adhesions, decompression of bowel via enterotomy w/ primary repair, and Abthera VAC placement on 12/6; s/p take down of Abthera, washout and re-application of the Abthera vac on 12/8. Now s/p SBR and end ileostomy on 12/10 acute respiratory distress now improving, Pneumothorax, hyperglycemia, delirium.    PLAN:    Neuro: A&Ox3   - tylenol PRN for pain control     Resp: acute respiratory failure upon initial presentation, COPD at baseline, spontaneous left sided pneumothorax s/p pig-tail catheter placement.   - Continue to monitor on RA  - Continue Duonebs, budesonide  - Left sided pigtail to suction, thoracic surgery recommending IR L chest tap   - Consult IR for L chest tap  - F/u AM CXR for evaluation of PTX  - f.u w/ thoracic if okay to remove left pigtail     CV: septic shock requiring vasopressor support, now hemodynamically stable   - Metoprolol 12.5 q6 hrs    GI: SBO s/p exploratory laparotomy, lysis of adhesions, decompression of bowel via enterotomy w/ primary repair, and Abthera VAC placement and removal with 150 cm of small bowel removed and end ileostomy.   - Regular diet w/ Ensure and TPN (calorie count) / NPO at MN for possible IR chest drainage  - Monitor ostomy output, LR 1 : 1 repletions, lomotil 2 tabs q6hrs & Imodium 4 mg q6hrs & tincture of opium 6 mg q6hrs    Renal: CHI, resolved   - Replete electrolytes as needed  - Monitor I/Os    Heme: no acute issues  - Lovenox for VTE prophylaxis  - SCDs     ID: No active issues   - Trend WBC, monitor fever curve    Endo: no acute issues  - SSI, q6 fingersticks    Dispo: SICU full code

## 2019-12-24 NOTE — PROGRESS NOTE ADULT - SUBJECTIVE AND OBJECTIVE BOX
[facility] NUTRITION SUPPORT / TPN -- FOLLOW UP NOTE  --------------------------------------------------------------------------------    24 hour events/subjective:  74y Male with a past medical history of COPD and EtOH dependence who presented 12/6 with abdominal pain, nausea, hematemesis, and poor PO intake for ~2-3 days. Labs significant for an CHI w/ Cr 2.74 and lactate of 9.4. He was also notably tachycardic to the 110s and hypotensive w/ SBP in the 70s. He was given 2 units of PRBCs and 2 L of LR in the ED due to concern for upper GI bleeding. Imaging revealed high grade SBO in the RLQ. Patient was taken to the OR emergently for an exploratory laparotomy, lysis of adhesions, decompression of bowel via enterotomy w/ primary repair, and Abthera VAC placement. Of note, the distal 50% of the bowel appeared dusky but viable. He required vasopressor support with phenylephrine and vasopressin infusions. He received 3000 mL of crystalloid w/ EBL of 10 mL and UOP of 25 mL. Patient was left intubated at the end of the case so SICU was consulted for hemodynamic monitoring. Taken back to the OR on 12/8 and underwent take down of Abthera, washout and re-application of the Abthera vac. Went back to OR on 12/10 night for colectomy with end ileostomy/ mucous fistula.    Pt w/ Lt PTX - had persistent air leak  12/24 No evidence of air leak to left chest tube  Tube clamped, w/ Repeat chest cxr in 4 hours  Anticipate chest tube removal if lung remains expanded  To follow up w/ IR for drainage of loculated left effusion once initial tube removed.    Pt w/o pain  Blisters on lips less painful/ dry  no n/v   Pt NPO on TPN w/ High ostomy output -but lessening- 2.35L  SICU to replace volume prn       Increased doses of lomotil, Imodium, & tincture of opium   Awaiting Speech & Swallow eval/ FEES  No f/c/s  no cp/palp/ sob/dyspnea/ cough/ abdominal pain  monitor HR, metop adjustment as needed        Diet:  Diet, Dysphagia 1 Pureed-Nectar Consistency Fluid (12-21-19 @ 15:44)  Diet, NPO:   NPO for Procedure/Test     NPO Start Date: 24-Dec-2019,   NPO Start Time: 10:30  Except Medications (12-24-19 @ 10:31)      Appetite: [ x ]Poor [  ]Adequate [  ]Good  Caloric intake:  [ x  ]  Adequate   [   ] Inadequate    ROS: General/ GI see HPI  all other systems negative      ALLERGIES & MEDICATIONS  --------------------------------------------------------------------------------  No Known Allergies      STANDING INPATIENT MEDICATIONS    acyclovir   Oral Tab/Cap 400 milliGRAM(s) Oral three times a day  albuterol/ipratropium for Nebulization 3 milliLiter(s) Nebulizer every 6 hours  buDESOnide    Inhalation Suspension 0.5 milliGRAM(s) Inhalation every 12 hours  chlorhexidine 2% Cloths 1 Application(s) Topical <User Schedule>  diphenoxylate/atropine 2 Tablet(s) Oral <User Schedule>  fat emulsion (Fish Oil and Plant Based) 20% Infusion 20.8 mL/Hr IV Continuous <Continuous>  insulin lispro (HumaLOG) corrective regimen sliding scale   SubCutaneous every 6 hours  insulin lispro (HumaLOG) corrective regimen sliding scale   SubCutaneous at bedtime  loperamide 8 milliGRAM(s) Oral <User Schedule>  metoprolol tartrate 12.5 milliGRAM(s) Oral every 6 hours  octreotide  Injectable 100 MICROGram(s) SubCutaneous three times a day  opium Tincture 6 milliGRAM(s) Oral <User Schedule>  pantoprazole  Injectable 40 milliGRAM(s) IV Push daily  Parenteral Nutrition - Adult 1 Each TPN Continuous <Continuous>  Parenteral Nutrition - Adult 1 Each TPN Continuous <Continuous>  sodium chloride 0.9%. 1000 milliLiter(s) IV Continuous <Continuous>      PRN INPATIENT MEDICATION  acetaminophen  IVPB .. 750 milliGRAM(s) IV Intermittent once PRN  ondansetron Injectable 4 milliGRAM(s) IV Push every 6 hours PRN        VITALS/PHYSICAL EXAM  --------------------------------------------------------------------------------  T(C): 37.5 (12-24-19 @ 11:00), Max: 37.5 (12-24-19 @ 11:00)  HR: 96 (12-24-19 @ 14:00) (94 - 109)  BP: 96/53 (12-24-19 @ 14:00) (87/53 - 136/69)  RR: 24 (12-24-19 @ 14:00) (21 - 41)  SpO2: 96% (12-24-19 @ 14:00) (93% - 100%)  Wt(kg): --        12-23-19 @ 07:01  -  12-24-19 @ 07:00  --------------------------------------------------------  IN: 5228.8 mL / OUT: 4400 mL / NET: 828.8 mL    12-24-19 @ 07:01  -  12-24-19 @ 15:17  --------------------------------------------------------  IN: 1460 mL / OUT: 1250 mL / NET: 210 mL    PHYSICAL EXAM  --------------------------------------------------------------------------------  	Gen: guarded but stable, A&Ox3  	HEENT: NC/AT, PERRL, supple neck, clear oropharynx, dried ruptured blisters  	Pulm: decreased BS B/L, Lt sided pigtail  	GI: (+) BS, softly distended, non tender,                    midline incision w/staples c/d/i w/o s/sx infection                   (+)ostomy pink viable- thick bilious liquid stool like material                   LLQ JOVANNA w/ serosanguinous drainage              MSK: FROM, no contractures nor deformities  	Vascular: Equally Warm, no clubbing, cyanosis, nor edema                        Rt PICC dressing c/d/I   	Neuro: No focal deficits, intact sensation, weakened strength  	Psych: Normal affect and mood  	Skin: Warm, without rashes, good turgor          LABS/ CULTURES/ RADIOLOGY:              8.1    9.40  >-----------<  412      [12-24-19 @ 01:10]              25.3     132  |  98  |  18  ----------------------------<  109      [12-24-19 @ 13:16]  4.7   |  26  |  0.50        Ca     7.9     [12-24-19 @ 13:16]      Mg     1.9     [12-24-19 @ 13:16]      Phos  4.1     [12-24-19 @ 13:16]    TPro  5.4  /  Alb  2.0  /  TBili  0.4  /  DBili  0.2  /  AST  26  /  ALT  37  /  AlkPhos  124  [12-23-19 @ 00:50]    PT/INR: PT 12.4 , INR 1.08       [12-23-19 @ 02:31]  PTT: 29.3       [12-23-19 @ 00:50]      CAPILLARY BLOOD GLUCOSE  POCT Blood Glucose.: 118 mg/dL (24 Dec 2019 12:31)  POCT Blood Glucose.: 108 mg/dL (24 Dec 2019 05:06)  POCT Blood Glucose.: 150 mg/dL (23 Dec 2019 23:27)  POCT Blood Glucose.: 101 mg/dL (23 Dec 2019 17:57)    Prealbumin, Serum: 14 mg/dL (12-24-19 @ 02:35)  Prealbumin, Serum: 14 mg/dL (12-23-19 @ 06:28)  Prealbumin, Serum: 15 mg/dL (12-20-19 @ 02:49)  Prealbumin, Serum: 5 mg/dL (12-13-19 @ 07:46)    Triglycerides, Serum: 69 mg/dL (12.24.19 @ 01:10)  Triglycerides, Serum: 73 mg/dL (12.23.19 @ 00:50)  Triglycerides, Serum: 107 mg/dL (12.20.19 @ 01:04)  Triglycerides, Serum: 131 mg/dL (12.17.19 @ 00:47)        < from: Xray Chest 1 View- PORTABLE-Routine (12.24.19 @ 14:22) >  INTERPRETATION:  A single chest x-ray was obtained on December 24, 2019.    Impression:    Follow-up left pneumothorax.    Impression:    The heart is normal in size. Right lower lobe pneumonia. Left lower lobe pneumonia. Small left pleural effusion. Pigtail catheter is seen left hemithorax. No pneumothorax. A PICC catheter is seen on the right and the tip is in the superior vena cava.    < end of copied text >

## 2019-12-24 NOTE — SWALLOW FEES ASSESSMENT ADULT - DIAGNOSTIC IMPRESSIONS
Chart reviewed and case d/w MD Flores. FEES to be deferred at this time. As per team, patient is tentatively going to IR for procedure and requires NPO. This service will follow up as appropriate. FEES tentatively re-scheduled for 12/26.

## 2019-12-24 NOTE — PROGRESS NOTE ADULT - SUBJECTIVE AND OBJECTIVE BOX
GENERAL SURGERY PROGRESS NOTE    SUBJECTIVE  Patient seen and examined. No acute events overnight. Reports tolerating diet without nausea, vomiting, +/+ ostomy, voiding without issues. Denies fever, chills, SOB, chest pain.     JOVANNA removed 12/23  IR consulted to tap L chest per CT surgery, IR declined. CT surgery aware. Plan pending.    OBJECTIVE    PHYSICAL EXAM  General: Appears well, NAD  CHEST: breathing comfortably, CT to waterseal SS drainage  CV: appears well perfused  Abdomen: soft, nontender, nondistended, no rebound or guarding, midline incision c/d/i, L JOVANNA site s/p removal with minimal drainage dressing c/d/i, ostomy +/+  Extremities: Grossly symmetric    T(C): 37.4 (12-24-19 @ 07:00), Max: 37.4 (12-24-19 @ 07:00)  HR: 109 (12-24-19 @ 09:00) (90 - 109)  BP: 116/60 (12-24-19 @ 09:00) (88/53 - 136/69)  RR: 41 (12-24-19 @ 09:00) (21 - 41)  SpO2: 96% (12-24-19 @ 09:00) (94% - 100%)    12-23-19 @ 07:01  -  12-24-19 @ 07:00  --------------------------------------------------------  IN: 5228.8 mL / OUT: 4400 mL / NET: 828.8 mL    12-24-19 @ 07:01  -  12-24-19 @ 09:54  --------------------------------------------------------  IN: 220 mL / OUT: 0 mL / NET: 220 mL        MEDICATIONS  acetaminophen  IVPB .. 750 milliGRAM(s) IV Intermittent once PRN  acyclovir   Oral Tab/Cap 400 milliGRAM(s) Oral three times a day  albuterol/ipratropium for Nebulization 3 milliLiter(s) Nebulizer every 6 hours  buDESOnide    Inhalation Suspension 0.5 milliGRAM(s) Inhalation every 12 hours  chlorhexidine 2% Cloths 1 Application(s) Topical <User Schedule>  diphenoxylate/atropine 2 Tablet(s) Oral <User Schedule>  enoxaparin Injectable 40 milliGRAM(s) SubCutaneous daily  fat emulsion (Fish Oil and Plant Based) 20% Infusion 20.8 mL/Hr IV Continuous <Continuous>  insulin lispro (HumaLOG) corrective regimen sliding scale   SubCutaneous every 6 hours  insulin lispro (HumaLOG) corrective regimen sliding scale   SubCutaneous at bedtime  lactated ringers. 1000 milliLiter(s) IV Continuous <Continuous>  loperamide 4 milliGRAM(s) Oral <User Schedule>  metoprolol tartrate 12.5 milliGRAM(s) Oral every 6 hours  ondansetron Injectable 4 milliGRAM(s) IV Push every 6 hours PRN  opium Tincture 6 milliGRAM(s) Oral <User Schedule>  Parenteral Nutrition - Adult 1 Each TPN Continuous <Continuous>      LABS                        8.1    9.40  )-----------( 412      ( 24 Dec 2019 01:10 )             25.3     12-24    133<L>  |  100  |  17  ----------------------------<  122<H>  4.8   |  25  |  0.55    Ca    8.0<L>      24 Dec 2019 01:10  Phos  3.7     12-24  Mg     1.8     12-24    TPro  5.4<L>  /  Alb  2.0<L>  /  TBili  0.4  /  DBili  0.2  /  AST  26  /  ALT  37  /  AlkPhos  124<H>  12-23    PT/INR - ( 23 Dec 2019 02:31 )   PT: 12.4 sec;   INR: 1.08 ratio         PTT - ( 23 Dec 2019 00:50 )  PTT:29.3 sec      RADIOLOGY & ADDITIONAL STUDIES

## 2019-12-24 NOTE — PROGRESS NOTE ADULT - SUBJECTIVE AND OBJECTIVE BOX
SICU DAILY PROGRESS NOTE    74y Male COPD and EtOH dependence who presented 12/6 with abdominal pain, nausea, hematemesis, and poor PO intake for ~2-3 days. Labs significant for an CHI w/ Cr 2.74 and lactate of 9.4. He was also notably tachycardic to the 110s and hypotensive w/ SBP in the 70s. He was given 2 units of PRBCs and 2 L of LR in the ED due to concern for upper GI bleeding. Imaging revealed high grade SBO in the RLQ. Patient was taken to the OR emergently for an exploratory laparotomy, lysis of adhesions, decompression of bowel via enterotomy w/ primary repair, and Abthera VAC placement. Of note, the distal 50% of the bowel appeared dusky but viable. He required vasopressor support with phenylephrine and vasopressin infusions. He received 3000 mL of crystalloid w/ EBL of 10 mL and UOP of 25 mL. Patient was left intubated at the end of the case so SICU was consulted for hemodynamic monitoring. Taken back to the OR on 12/8 and underwent take down of Abthera, washout and re-application of the Abthera vac. Went back to OR on 12/10 night for colectomy.    24 HOUR EVENTS:  - started on acyclovir for concern for herpetic rash around mouth   - ileostomy output downtrending however patient NPO   - possible drainage of left pleural effusion     SUBJECTIVE/ROS:  [x ] A ten-point review of systems was otherwise negative except as noted.  [ ] Due to altered mental status/intubation, subjective information were not able to be obtained from the patient. History was obtained, to the extent possible, from review of the chart and collateral sources of information.      NEURO  Exam: awake, alert, oriented  Meds: acetaminophen  IVPB .. 750 milliGRAM(s) IV Intermittent once PRN Mild Pain (1 - 3)  ondansetron Injectable 4 milliGRAM(s) IV Push every 6 hours PRN Nausea and/or Vomiting  opium Tincture 6 milliGRAM(s) Oral <User Schedule>    [x] Adequacy of sedation and pain control has been assessed and adjusted      RESPIRATORY  RR: 29 (12-24-19 @ 02:00) (19 - 35)  SpO2: 96% (12-24-19 @ 02:00) (96% - 100%)  Wt(kg): --  Exam: unlabored, clear to auscultation bilaterally  Mechanical Ventilation:     [N/A] Extubation Readiness Assessed  Meds: albuterol/ipratropium for Nebulization 3 milliLiter(s) Nebulizer every 6 hours  buDESOnide    Inhalation Suspension 0.5 milliGRAM(s) Inhalation every 12 hours        CARDIOVASCULAR  HR: 107 (12-24-19 @ 02:00) (86 - 107)  BP: 104/61 (12-24-19 @ 02:00) (88/53 - 155/74)  BP(mean): 77 (12-24-19 @ 02:00) (64 - 107)  ABP: --  ABP(mean): --  Wt(kg): --  CVP(cm H2O): --      Exam: regular rate and rhythm  Cardiac Rhythm: sinus  Perfusion     [x]Adequate   [ ]Inadequate  Mentation   [x]Normal       [ ]Reduced  Extremities  [x]Warm         [ ]Cool  Volume Status [ ]Hypervolemic [x]Euvolemic [ ]Hypovolemic  Meds: metoprolol tartrate 12.5 milliGRAM(s) Oral every 6 hours        GI/NUTRITION  Exam: soft, nontender, nondistended, incision c/d/i, osotmy pink/viable   Diet: NPO   Meds: diphenoxylate/atropine 2 Tablet(s) Oral <User Schedule>  loperamide 4 milliGRAM(s) Oral <User Schedule>      GENITOURINARY  I&O's Detail    12-22 @ 07:01  -  12-23 @ 07:00  --------------------------------------------------------  IN:    fat emulsion (Fish Oil and Plant Based) 20% Infusion: 228.8 mL    lactated ringers.: 660 mL    Oral Fluid: 1060 mL    sodium chloride 0.9%: 3490 mL    TPN (Total Parenteral Nutrition): 2400 mL  Total IN: 7838.8 mL    OUT:    Ileostomy: 4300 mL    Incontinent per Condom Catheter: 1700 mL  Total OUT: 6000 mL    Total NET: 1838.8 mL      12-23 @ 07:01  -  12-24 @ 03:00  --------------------------------------------------------  IN:    fat emulsion (Fish Oil and Plant Based) 20% Infusion: 166.4 mL    lactated ringers.: 2010 mL    TPN (Total Parenteral Nutrition): 1900 mL  Total IN: 4076.4 mL    OUT:    Ileostomy: 1850 mL    Incontinent per Condom Catheter: 600 mL    Voided: 450 mL  Total OUT: 2900 mL    Total NET: 1176.4 mL          12-24    133<L>  |  100  |  17  ----------------------------<  122<H>  4.8   |  25  |  0.55    Ca    8.0<L>      24 Dec 2019 01:10  Phos  3.7     12-24  Mg     1.8     12-24    TPro  5.4<L>  /  Alb  2.0<L>  /  TBili  0.4  /  DBili  0.2  /  AST  26  /  ALT  37  /  AlkPhos  124<H>  12-23    [ ] Martinez catheter, indication: N/A  Meds: fat emulsion (Fish Oil and Plant Based) 20% Infusion 20.8 mL/Hr IV Continuous <Continuous>  lactated ringers. 1000 milliLiter(s) IV Continuous <Continuous>  Parenteral Nutrition - Adult 1 Each TPN Continuous <Continuous>        HEMATOLOGIC  Meds: enoxaparin Injectable 40 milliGRAM(s) SubCutaneous daily    [x] VTE Prophylaxis                        8.1    9.40  )-----------( 412      ( 24 Dec 2019 01:10 )             25.3     PT/INR - ( 23 Dec 2019 02:31 )   PT: 12.4 sec;   INR: 1.08 ratio         PTT - ( 23 Dec 2019 00:50 )  PTT:29.3 sec  Transfusion     [ ] PRBC   [ ] Platelets   [ ] FFP   [ ] Cryoprecipitate      INFECTIOUS DISEASES  WBC Count: 9.40 K/uL (12-24 @ 01:10)    RECENT CULTURES:    Meds: acyclovir   Oral Tab/Cap 400 milliGRAM(s) Oral three times a day        ENDOCRINE  CAPILLARY BLOOD GLUCOSE      POCT Blood Glucose.: 150 mg/dL (23 Dec 2019 23:27)  POCT Blood Glucose.: 101 mg/dL (23 Dec 2019 17:57)  POCT Blood Glucose.: 104 mg/dL (23 Dec 2019 11:29)  POCT Blood Glucose.: 95 mg/dL (23 Dec 2019 05:55)  POCT Blood Glucose.: 36 mg/dL (23 Dec 2019 05:54)    Meds: insulin lispro (HumaLOG) corrective regimen sliding scale   SubCutaneous every 6 hours        ACCESS DEVICES:  [ ] Peripheral IV  [ ] Central Venous Line	[ ] R	[ ] L	[ ] IJ	[ ] Fem	[ ] SC	Placed:   [ ] Arterial Line		[ ] R	[ ] L	[ ] Fem	[ ] Rad	[ ] Ax	Placed:   [ ] PICC:					[ ] Mediport  [ ] Urinary Catheter, Date Placed:   [x] Necessity of urinary, arterial, and venous catheters discussed    OTHER MEDICATIONS:  chlorhexidine 2% Cloths 1 Application(s) Topical <User Schedule>

## 2019-12-24 NOTE — PROGRESS NOTE ADULT - SUBJECTIVE AND OBJECTIVE BOX
VITAL SIGNS    Telemetry:  ST 90's  Vital Signs Last 24 Hrs  T(C): 37.4 (19 @ 07:00), Max: 37.4 (19 @ 07:00)  T(F): 99.3 (19 @ 07:00), Max: 99.3 (19 @ 07:00)  HR: 109 (19 @ 09:00) (90 - 109)  BP: 116/60 (19 @ 09:00) (88/53 - 136/69)  RR: 41 (19 @ 09:00) (21 - 41)  SpO2: 96% (19 @ 09:00) (94% - 100%)             @ 07:01  -   @ 07:00  --------------------------------------------------------  IN: 5228.8 mL / OUT: 4400 mL / NET: 828.8 mL     @ 07:01  -   @ 11:16  --------------------------------------------------------  IN: 220 mL / OUT: 0 mL / NET: 220 mL       Daily     Daily Weight in k.4 (24 Dec 2019 00:29)  Admit Wt: Drug Dosing Weight  Height (cm): 175.3 (09 Dec 2019 09:00)  Weight (kg): 54.2 (06 Dec 2019 22:12)  BMI (kg/m2): 17.6 (09 Dec 2019 09:00)  BSA (m2): 1.66 (09 Dec 2019 09:00)      CAPILLARY BLOOD GLUCOSE      POCT Blood Glucose.: 108 mg/dL (24 Dec 2019 05:06)  POCT Blood Glucose.: 150 mg/dL (23 Dec 2019 23:27)  POCT Blood Glucose.: 101 mg/dL (23 Dec 2019 17:57)  POCT Blood Glucose.: 104 mg/dL (23 Dec 2019 11:29)          MEDICATIONS  acetaminophen  IVPB .. 750 milliGRAM(s) IV Intermittent once PRN  acyclovir   Oral Tab/Cap 400 milliGRAM(s) Oral three times a day  albuterol/ipratropium for Nebulization 3 milliLiter(s) Nebulizer every 6 hours  buDESOnide    Inhalation Suspension 0.5 milliGRAM(s) Inhalation every 12 hours  chlorhexidine 2% Cloths 1 Application(s) Topical <User Schedule>  diphenoxylate/atropine 2 Tablet(s) Oral <User Schedule>  fat emulsion (Fish Oil and Plant Based) 20% Infusion 20.8 mL/Hr IV Continuous <Continuous>  insulin lispro (HumaLOG) corrective regimen sliding scale   SubCutaneous every 6 hours  insulin lispro (HumaLOG) corrective regimen sliding scale   SubCutaneous at bedtime  loperamide 4 milliGRAM(s) Oral <User Schedule>  metoprolol tartrate 12.5 milliGRAM(s) Oral every 6 hours  ondansetron Injectable 4 milliGRAM(s) IV Push every 6 hours PRN  opium Tincture 6 milliGRAM(s) Oral <User Schedule>  pantoprazole  Injectable 40 milliGRAM(s) IV Push daily  Parenteral Nutrition - Adult 1 Each TPN Continuous <Continuous>  sodium chloride 0.9%. 1000 milliLiter(s) IV Continuous <Continuous>      >>> <<<  PHYSICAL EXAM  Subjective: NAD  Neurology: alert and oriented x 3, nonfocal, no gross deficits  CV : s1s2  Lungs: Left chest tube to water seal, no airleak  Abdomen: soft, NT,ND, ( -)BM  :  alison  Extremities:   -c/c/e    LABS  12-24    133<L>  |  100  |  17  ----------------------------<  122<H>  4.8   |  25  |  0.55    Ca    8.0<L>      24 Dec 2019 01:10  Phos  3.7     12-24  Mg     1.8     12-24    TPro  5.4<L>  /  Alb  2.0<L>  /  TBili  0.4  /  DBili  0.2  /  AST  26  /  ALT  37  /  AlkPhos  124<H>                                   8.1    9.40  )-----------( 412      ( 24 Dec 2019 01:10 )             25.3          PT/INR - ( 23 Dec 2019 02:31 )   PT: 12.4 sec;   INR: 1.08 ratio         PTT - ( 23 Dec 2019 00:50 )  PTT:29.3 sec       PAST MEDICAL & SURGICAL HISTORY:  COPD with hypoxia  ETOHism  ETOH abuse  History of lumbosacral spine surgery  History of prostate surgery  History of appendectomy

## 2019-12-24 NOTE — CHART NOTE - NSCHARTNOTEFT_GEN_A_CORE
Nutrition Follow Up Note  Patient seen for: TPN follow up    Chart reviewed, events noted. "75 y/o M presenting with septic shock and high grade SBO s/p exploratory laparotomy, lysis of adhesions, decompression of bowel via enterotomy w/ primary repair, and Abthera VAC placement on ; s/p take down of Abthera, washout and re-application of the Abthera vac on . Now s/p SBR and end ileostomy on 12/10." Hospital course c/b high ostomy output. Continues on anti-motility agents (Lomotil, Imodium and Tincture of Opium) as ordered. Pt seen by SLP for swallow evaluation on ; recommended dysphagia 1 puree diet with nectar thick liquids. Was scheduled for FEES on  and , however deferred due to NPO status. Noted with pneumothorax; IR consulted for drainage.     Source: TPN team, SICU medical rounds, pt, chart review    Nutrition Status: Severe malnutrition. S/P GI surgery x 3 with 150cm small bowel remaining. TPN initiated on . Infusing at full-strength at this time. Insulin now removed from TPN bag due to improvement in hyperglycemia.     Diet: Pt now NPO (since ). Was previously receiving: Dysphagia 1 Pureed-Nectar Consistency Fluid (); Dysphagia 2 Mechanical Soft-Nectar Consistency Fluid (-).     Parenteral Nutrition: (ordered ): To infuse at 100ml/hr (120Gm amino acids, 210Gm dextrose, 50Gm SMOF lipids). To provide: 1694kcal (31Kcal/Kg and 2.2Gm protein/Kg dosing wt 54.2Kg); with 10ml MVI and 3ml MTE-5.     Non-Protein Calories: 1214kcal/day (22kcal/Kg)  Dextrose Infusion Rate: 2.7 mg/Kg/min  Lipid Infusion Rate: 0.92 Gm/Kg/day; .08 Gm/Kg/hr    Ostomy Output (per nursing flow sheet):   (): 1750ml  (): 2530ml  (): 5100ml   (): 4200ml     Daily Weight in k.4 (-), Weight in k.6 (12-23), Weight in k.3 (12-22), Weight in k.9 (12-21), Weight in k.1 (12-20), Weight in k.7 (12-19), Weight in k.3 (12-18)  % Weight Change    Pertinent Medications: MEDICATIONS  (STANDING):  acyclovir   Oral Tab/Cap 400 milliGRAM(s) Oral three times a day  albuterol/ipratropium for Nebulization 3 milliLiter(s) Nebulizer every 6 hours  buDESOnide    Inhalation Suspension 0.5 milliGRAM(s) Inhalation every 12 hours  chlorhexidine 2% Cloths 1 Application(s) Topical <User Schedule>  diphenoxylate/atropine 2 Tablet(s) Oral <User Schedule>  fat emulsion (Fish Oil and Plant Based) 20% Infusion 20.8 mL/Hr (20.8 mL/Hr) IV Continuous <Continuous>  insulin lispro (HumaLOG) corrective regimen sliding scale   SubCutaneous every 6 hours  insulin lispro (HumaLOG) corrective regimen sliding scale   SubCutaneous at bedtime  loperamide 4 milliGRAM(s) Oral <User Schedule>  magnesium sulfate  IVPB 2 Gram(s) IV Intermittent once  metoprolol tartrate 12.5 milliGRAM(s) Oral every 6 hours  opium Tincture 6 milliGRAM(s) Oral <User Schedule>  pantoprazole  Injectable 40 milliGRAM(s) IV Push daily  Parenteral Nutrition - Adult 1 Each (100 mL/Hr) TPN Continuous <Continuous>  Parenteral Nutrition - Adult 1 Each (100 mL/Hr) TPN Continuous <Continuous>  sodium chloride 0.9%. 1000 milliLiter(s) (10 mL/Hr) IV Continuous <Continuous>    MEDICATIONS  (PRN):  acetaminophen  IVPB .. 750 milliGRAM(s) IV Intermittent once PRN Mild Pain (1 - 3)  ondansetron Injectable 4 milliGRAM(s) IV Push every 6 hours PRN Nausea and/or Vomiting    Pertinent Labs:  @ 13:16: Na 132<L>, BUN 18, Cr 0.50, <H>, K+ 4.7, Phos 4.1, Mg 1.9, Alk Phos --, ALT/SGPT --, AST/SGOT --, HbA1c --   @ 01:10: Na 133<L>, BUN 17, Cr 0.55, <H>, K+ 4.8, Phos 3.7, Mg 1.8, Alk Phos --, ALT/SGPT --, AST/SGOT --, HbA1c --    Finger Sticks:  POCT Blood Glucose.: 118 mg/dL ( @ 12:31)  POCT Blood Glucose.: 108 mg/dL ( @ 05:06)  POCT Blood Glucose.: 150 mg/dL ( @ 23:27)  POCT Blood Glucose.: 101 mg/dL ( @ 17:57)      Skin per nursing documentation: surgical incision midline abdomen   Edema: none noted    Estimated Needs:   [x] no change since previous assessment  based on dosing wt 54.2Kg, with consideration for TPN, malnutrition  1503-4409 gregorio/day (25-30cal/Kg)   Gm protein/day (1.8-2.2Gm/Kg)  [ ] recalculated:     Previous Nutrition Diagnosis: severe malnutrition  Nutrition Diagnosis is: remains appropriate with provision of TPN    Interventions:     Recommend  1) TPN per TPN Team/Nutrition assessment  2) PO diet as per discretion of medical team; consistency as per medical team, SLP.     Monitoring and Evaluation:     Continue to monitor Nutritional intake, Tolerance to diet prescription, weights, labs, skin integrity    RD remains available upon request and will follow up per protocol

## 2019-12-24 NOTE — PROGRESS NOTE ADULT - ASSESSMENT
74M s/p ex lap, MIRYAM, closure of enterotomy, abthera vac placement 12/8 early morning. RTOR for exlap/washout 12/9. s/p RTOR ex lap, resection of 150cm bowel, creation of colostomy. Course c/b spontaneous PTX s/p pigtail 12/15, pleuravac.    Plan   - evaluated by speech and swallow who suggested dysphagia 1 necator diet and FEES when not NPO  - appreciate CT surgery consult  - c/w chest tube to waterseal  - c/w imodium, lomotil, and opium tincture, monitor ostomy output   - c/w 1:1 ostomy repletions    - TPN  - appreciate excellent SICU care    [facility]  [id number]

## 2019-12-24 NOTE — PROGRESS NOTE ADULT - ASSESSMENT
75 y/o M presenting with septic shock and high grade SBO s/p exploratory laparotomy, lysis of adhesions, decompression of bowel via enterotomy w/ primary repair, and Abthera VAC placement on 12/6; s/p take down of Abthera, washout and re-application of the Abthera vac on 12/8. Now s/p SBR and end ileostomy on 12/10 acute respiratory distress now improving, Pneumothorax, hyperglycemia, delirium. Now still with persistent pneumothorax when chest pigtail clamped.   12/23 VSS on room air recommending IR drainage of left chest  12/24 No evidence of air leak to left chest tube. Tube clamped. Repeat chest cxr in 4 hours. Anticipate chest tube removal if lung remains expanded. Drainage of loculated left effusion discussed with IR. IR to reconsult for drainage once initial tube removed. Discussed plan with Dr Mayes and IR Fellow

## 2019-12-24 NOTE — PROGRESS NOTE ADULT - ATTENDING COMMENTS
Pt seen and examined with residents and PA, agree with above.    1. High-output ileostomy due to short gut syndrome:  - Continue lomotil, imodium, and tincture of opium: will increase dose of lomotil and imodium (usual dose parameters can be exceeded per expert guidelines in short gut syndrome for lomotil and imodium)  - Continue TPN  - PPI to decrease gastric secretions  - Continue 1/1 replacements of ostomy output to prevent hypovolemia and CHI  - Will discuss possibility of octreotide and Gadex with Dr. Siegel    2. L PTX s/p pleural pigtail catheter, air leak resolved, now with L pleural effusion with LLL consolidation on my personal review of CT chest and today's CXR:  - Thoracic to discuss with IR    3. Oral lesions:  - Acyclovir for possible HSV    4. Mild hyponatremia:  - Change repletion fluid to NS, recheck BMP in AM

## 2019-12-24 NOTE — PROGRESS NOTE ADULT - ASSESSMENT
A/P: 74 year old male w/ PMH of COPD and EtOH dependence with PSH/o appy, prostate surgery, & spine surgery  septic shock and high grade SBO s/p exploratory laparotomy, lysis of adhesions, decompression of bowel via enterotomy w/ primary repair, and Abthera VAC placement on 12/6; s/p take down of Abthera, washout and re-application of the Abthera vac on 12/8. Now s/p SBR and end ileostomy on 12/10.   TPN consulted to assist w/ management of pt's nutrition in pt w/ prolonged hospital course now tolerating diet but has high Ileostomy output     Continue TPN at full strength  pt w/ severe Protein-Calorie Malnutrition-  NPO awaiting FEES with Speech Tx    TPN GOAL:  120 grams amino acids (800ml 15% a.a.)  210 grams dextrose (300ml 70% dex)  50 grams SMOFlipid (250ml 20%IVFE @ 20.8ml/hr x 12 hours)  in 2400mL volume  Micronutrients: 10ml MVI, 3ml MTE-5      HypoNa & ELevated Bicarb- improving & will continue to monitor-  HypoK improving with increased KCl in TPN  PICC w/dedicated port for only TPN - maintenance as per protocol  Strict Intake and Output- high ileostomy output -On TPN and replete as per SICU       replete with NS as per SICU continue to monitor       pt now on lomotil, imodium, and tincture of opium       Continue to monitor for CHI -BUN/ Crt - improving  Weights three times a week  Monitor BMP, Mg, Ionized Ca, Phosphorus daily  Continue to monitor Triglycerides weekly and Pre-albumin weekly  Hyperglycemia - improved - Insulin removed from TPN      Fingersticks to monitor glucose every 6 hours until stable, may be decreased to twice a day      ISS coverage - to be ordered by primary team  For IR drainage of Loculated Lt chest pleural effusion  Continue as per SICU / Surgery, will follow with you, D/w primary team    Andreina Hubbard PA-C  TPN team, pager [phone]  D/w Ana M Siegel

## 2019-12-25 LAB
ALBUMIN SERPL ELPH-MCNC: 2 G/DL — LOW (ref 3.3–5)
ALP SERPL-CCNC: 124 U/L — HIGH (ref 40–120)
ALT FLD-CCNC: 36 U/L — SIGNIFICANT CHANGE UP (ref 10–45)
ANION GAP SERPL CALC-SCNC: 8 MMOL/L — SIGNIFICANT CHANGE UP (ref 5–17)
ANION GAP SERPL CALC-SCNC: 9 MMOL/L — SIGNIFICANT CHANGE UP (ref 5–17)
AST SERPL-CCNC: 25 U/L — SIGNIFICANT CHANGE UP (ref 10–40)
BILIRUB DIRECT SERPL-MCNC: 0.2 MG/DL — SIGNIFICANT CHANGE UP (ref 0–0.2)
BILIRUB INDIRECT FLD-MCNC: 0.1 MG/DL — LOW (ref 0.2–1)
BILIRUB SERPL-MCNC: 0.3 MG/DL — SIGNIFICANT CHANGE UP (ref 0.2–1.2)
BUN SERPL-MCNC: 17 MG/DL — SIGNIFICANT CHANGE UP (ref 7–23)
BUN SERPL-MCNC: 18 MG/DL — SIGNIFICANT CHANGE UP (ref 7–23)
CALCIUM SERPL-MCNC: 7.5 MG/DL — LOW (ref 8.4–10.5)
CALCIUM SERPL-MCNC: 7.6 MG/DL — LOW (ref 8.4–10.5)
CHLORIDE SERPL-SCNC: 100 MMOL/L — SIGNIFICANT CHANGE UP (ref 96–108)
CHLORIDE SERPL-SCNC: 101 MMOL/L — SIGNIFICANT CHANGE UP (ref 96–108)
CO2 SERPL-SCNC: 23 MMOL/L — SIGNIFICANT CHANGE UP (ref 22–31)
CO2 SERPL-SCNC: 25 MMOL/L — SIGNIFICANT CHANGE UP (ref 22–31)
CREAT SERPL-MCNC: 0.48 MG/DL — LOW (ref 0.5–1.3)
CREAT SERPL-MCNC: 0.48 MG/DL — LOW (ref 0.5–1.3)
GLUCOSE BLDC GLUCOMTR-MCNC: 108 MG/DL — HIGH (ref 70–99)
GLUCOSE BLDC GLUCOMTR-MCNC: 130 MG/DL — HIGH (ref 70–99)
GLUCOSE BLDC GLUCOMTR-MCNC: 134 MG/DL — HIGH (ref 70–99)
GLUCOSE BLDC GLUCOMTR-MCNC: 147 MG/DL — HIGH (ref 70–99)
GLUCOSE SERPL-MCNC: 116 MG/DL — HIGH (ref 70–99)
GLUCOSE SERPL-MCNC: 180 MG/DL — HIGH (ref 70–99)
HCT VFR BLD CALC: 23.9 % — LOW (ref 39–50)
HGB BLD-MCNC: 7.8 G/DL — LOW (ref 13–17)
MAGNESIUM SERPL-MCNC: 2 MG/DL — SIGNIFICANT CHANGE UP (ref 1.6–2.6)
MAGNESIUM SERPL-MCNC: 2.2 MG/DL — SIGNIFICANT CHANGE UP (ref 1.6–2.6)
MCHC RBC-ENTMCNC: 31 PG — SIGNIFICANT CHANGE UP (ref 27–34)
MCHC RBC-ENTMCNC: 32.6 GM/DL — SIGNIFICANT CHANGE UP (ref 32–36)
MCV RBC AUTO: 94.8 FL — SIGNIFICANT CHANGE UP (ref 80–100)
NRBC # BLD: 0 /100 WBCS — SIGNIFICANT CHANGE UP (ref 0–0)
PHOSPHATE SERPL-MCNC: 3.7 MG/DL — SIGNIFICANT CHANGE UP (ref 2.5–4.5)
PHOSPHATE SERPL-MCNC: 4.2 MG/DL — SIGNIFICANT CHANGE UP (ref 2.5–4.5)
PLATELET # BLD AUTO: 384 K/UL — SIGNIFICANT CHANGE UP (ref 150–400)
POTASSIUM SERPL-MCNC: 4.7 MMOL/L — SIGNIFICANT CHANGE UP (ref 3.5–5.3)
POTASSIUM SERPL-MCNC: 5.4 MMOL/L — HIGH (ref 3.5–5.3)
POTASSIUM SERPL-SCNC: 4.7 MMOL/L — SIGNIFICANT CHANGE UP (ref 3.5–5.3)
POTASSIUM SERPL-SCNC: 5.4 MMOL/L — HIGH (ref 3.5–5.3)
PREALB SERPL-MCNC: 13 MG/DL — LOW (ref 20–40)
PROT SERPL-MCNC: 5.3 G/DL — LOW (ref 6–8.3)
RBC # BLD: 2.52 M/UL — LOW (ref 4.2–5.8)
RBC # FLD: 13.5 % — SIGNIFICANT CHANGE UP (ref 10.3–14.5)
SODIUM SERPL-SCNC: 132 MMOL/L — LOW (ref 135–145)
SODIUM SERPL-SCNC: 134 MMOL/L — LOW (ref 135–145)
TRIGL SERPL-MCNC: 75 MG/DL — SIGNIFICANT CHANGE UP (ref 10–149)
WBC # BLD: 9.4 K/UL — SIGNIFICANT CHANGE UP (ref 3.8–10.5)
WBC # FLD AUTO: 9.4 K/UL — SIGNIFICANT CHANGE UP (ref 3.8–10.5)

## 2019-12-25 PROCEDURE — 71045 X-RAY EXAM CHEST 1 VIEW: CPT | Mod: 26

## 2019-12-25 PROCEDURE — 99232 SBSQ HOSP IP/OBS MODERATE 35: CPT

## 2019-12-25 PROCEDURE — 99231 SBSQ HOSP IP/OBS SF/LOW 25: CPT

## 2019-12-25 RX ORDER — LOPERAMIDE HCL 2 MG
8 TABLET ORAL
Refills: 0 | Status: DISCONTINUED | OUTPATIENT
Start: 2019-12-25 | End: 2019-12-26

## 2019-12-25 RX ORDER — ELECTROLYTE SOLUTION,INJ
1 VIAL (ML) INTRAVENOUS
Refills: 0 | Status: DISCONTINUED | OUTPATIENT
Start: 2019-12-25 | End: 2019-12-25

## 2019-12-25 RX ORDER — DIPHENOXYLATE HCL/ATROPINE 2.5-.025MG
2 TABLET ORAL
Refills: 0 | Status: DISCONTINUED | OUTPATIENT
Start: 2019-12-25 | End: 2019-12-30

## 2019-12-25 RX ORDER — SODIUM CHLORIDE 9 MG/ML
500 INJECTION INTRAMUSCULAR; INTRAVENOUS; SUBCUTANEOUS ONCE
Refills: 0 | Status: COMPLETED | OUTPATIENT
Start: 2019-12-25 | End: 2019-12-25

## 2019-12-25 RX ORDER — ENOXAPARIN SODIUM 100 MG/ML
40 INJECTION SUBCUTANEOUS DAILY
Refills: 0 | Status: DISCONTINUED | OUTPATIENT
Start: 2019-12-25 | End: 2019-12-29

## 2019-12-25 RX ORDER — MORPHINE 10 MG/ML
6 SOLUTION ORAL
Refills: 0 | Status: DISCONTINUED | OUTPATIENT
Start: 2019-12-25 | End: 2019-12-30

## 2019-12-25 RX ORDER — I.V. FAT EMULSION 20 G/100ML
20.8 EMULSION INTRAVENOUS
Qty: 50 | Refills: 0 | Status: DISCONTINUED | OUTPATIENT
Start: 2019-12-25 | End: 2019-12-26

## 2019-12-25 RX ADMIN — SODIUM CHLORIDE 500 MILLILITER(S): 9 INJECTION INTRAMUSCULAR; INTRAVENOUS; SUBCUTANEOUS at 15:45

## 2019-12-25 RX ADMIN — SODIUM CHLORIDE 10 MILLILITER(S): 9 INJECTION INTRAMUSCULAR; INTRAVENOUS; SUBCUTANEOUS at 06:00

## 2019-12-25 RX ADMIN — Medication 8 MILLIGRAM(S): at 14:34

## 2019-12-25 RX ADMIN — OCTREOTIDE ACETATE 100 MICROGRAM(S): 200 INJECTION, SOLUTION INTRAVENOUS; SUBCUTANEOUS at 22:56

## 2019-12-25 RX ADMIN — MORPHINE 6 MILLIGRAM(S): 10 SOLUTION ORAL at 07:38

## 2019-12-25 RX ADMIN — Medication 3 MILLILITER(S): at 05:14

## 2019-12-25 RX ADMIN — OCTREOTIDE ACETATE 100 MICROGRAM(S): 200 INJECTION, SOLUTION INTRAVENOUS; SUBCUTANEOUS at 06:04

## 2019-12-25 RX ADMIN — MORPHINE 6 MILLIGRAM(S): 10 SOLUTION ORAL at 17:38

## 2019-12-25 RX ADMIN — Medication 2 TABLET(S): at 07:38

## 2019-12-25 RX ADMIN — OCTREOTIDE ACETATE 100 MICROGRAM(S): 200 INJECTION, SOLUTION INTRAVENOUS; SUBCUTANEOUS at 14:38

## 2019-12-25 RX ADMIN — Medication 12.5 MILLIGRAM(S): at 05:19

## 2019-12-25 RX ADMIN — Medication 8 MILLIGRAM(S): at 11:46

## 2019-12-25 RX ADMIN — MORPHINE 6 MILLIGRAM(S): 10 SOLUTION ORAL at 11:46

## 2019-12-25 RX ADMIN — Medication 2 TABLET(S): at 14:36

## 2019-12-25 RX ADMIN — Medication 3 MILLILITER(S): at 11:17

## 2019-12-25 RX ADMIN — Medication 8 MILLIGRAM(S): at 22:57

## 2019-12-25 RX ADMIN — SODIUM CHLORIDE 10 MILLILITER(S): 9 INJECTION INTRAMUSCULAR; INTRAVENOUS; SUBCUTANEOUS at 22:58

## 2019-12-25 RX ADMIN — PANTOPRAZOLE SODIUM 40 MILLIGRAM(S): 20 TABLET, DELAYED RELEASE ORAL at 11:47

## 2019-12-25 RX ADMIN — Medication 1 EACH: at 16:39

## 2019-12-25 RX ADMIN — ENOXAPARIN SODIUM 40 MILLIGRAM(S): 100 INJECTION SUBCUTANEOUS at 17:38

## 2019-12-25 RX ADMIN — Medication 0.5 MILLIGRAM(S): at 05:14

## 2019-12-25 RX ADMIN — CHLORHEXIDINE GLUCONATE 1 APPLICATION(S): 213 SOLUTION TOPICAL at 05:18

## 2019-12-25 RX ADMIN — Medication 12.5 MILLIGRAM(S): at 01:24

## 2019-12-25 RX ADMIN — Medication 400 MILLIGRAM(S): at 14:36

## 2019-12-25 RX ADMIN — Medication 2 TABLET(S): at 17:38

## 2019-12-25 RX ADMIN — MORPHINE 6 MILLIGRAM(S): 10 SOLUTION ORAL at 22:56

## 2019-12-25 RX ADMIN — Medication 400 MILLIGRAM(S): at 22:57

## 2019-12-25 RX ADMIN — MORPHINE 6 MILLIGRAM(S): 10 SOLUTION ORAL at 14:37

## 2019-12-25 RX ADMIN — Medication 8 MILLIGRAM(S): at 17:38

## 2019-12-25 RX ADMIN — Medication 2 TABLET(S): at 11:47

## 2019-12-25 RX ADMIN — SODIUM CHLORIDE 10 MILLILITER(S): 9 INJECTION INTRAMUSCULAR; INTRAVENOUS; SUBCUTANEOUS at 00:30

## 2019-12-25 RX ADMIN — Medication 2 TABLET(S): at 22:57

## 2019-12-25 RX ADMIN — I.V. FAT EMULSION 20.8 ML/HR: 20 EMULSION INTRAVENOUS at 16:39

## 2019-12-25 RX ADMIN — Medication 400 MILLIGRAM(S): at 05:18

## 2019-12-25 RX ADMIN — Medication 3 MILLILITER(S): at 00:38

## 2019-12-25 RX ADMIN — Medication 8 MILLIGRAM(S): at 07:38

## 2019-12-25 NOTE — PROGRESS NOTE ADULT - SUBJECTIVE AND OBJECTIVE BOX
VITAL SIGN    Vital Signs Last 24 Hrs  T(C): 37.5 (19 @ 07:00), Max: 37.5 (19 @ 07:00)  T(F): 99.5 (19 @ 07:00), Max: 99.5 (19 @ 07:00)  HR: 95 (19 @ 13:00) (85 - 105)  BP: 88/51 (19 @ 13:00) (76/52 - 136/64)  RR: 25 (19 @ 13:00) (17 - 35)  SpO2: 99% (19 @ 13:00) (79% - 100%)                   Daily     Daily Weight in k.3 (25 Dec 2019 04:00)      Bilirubin Direct, Serum: 0.2 mg/dL ( @ 02:29)  Bilirubin Total, Serum: 0.3 mg/dL ( @ 02:29)    CAPILLARY BLOOD GLUCOSE      POCT Blood Glucose.: 134 mg/dL (25 Dec 2019 12:04)  POCT Blood Glucose.: 130 mg/dL (25 Dec 2019 05:34)  POCT Blood Glucose.: 118 mg/dL (24 Dec 2019 22:56)  POCT Blood Glucose.: 114 mg/dL (24 Dec 2019 17:21)                          PHYSICAL EXAM  s   some  sob  no fever or chills"  Neurology: alert and oriented x 3, moves all extremities with no defecits  CV :  RRR  Lungs:   CTA B/L  Abdomen: soft, nontender, nondistended, positive bowel sounds,

## 2019-12-25 NOTE — PROGRESS NOTE ADULT - SUBJECTIVE AND OBJECTIVE BOX
GENERAL SURGERY PROGRESS NOTE    SUBJECTIVE  Patient seen and examined. L chest pigtail removed 12/24, denies SOB, on 3L NC this AM. NPO/IVF without nausea, vomiting, +/+ ostomy, sitting in chair. Denies fever, chills, SOB, chest pain.     ostomy output decreased while NPO    OBJECTIVE    PHYSICAL EXAM  General: Appears well, NAD  CHEST: breathing comfortably on 3L NC  CV: appears well perfused  Abdomen: soft, nontender, nondistended, incision c/d/i, ostomy +/+  Extremities: Grossly symmetric    T(C): 37.5 (12-25-19 @ 07:00), Max: 37.5 (12-25-19 @ 07:00)  HR: 87 (12-25-19 @ 11:19) (85 - 105)  BP: 78/52 (12-25-19 @ 10:00) (78/52 - 136/64)  RR: 18 (12-25-19 @ 10:00) (17 - 35)  SpO2: 100% (12-25-19 @ 11:19) (79% - 100%)    12-24-19 @ 07:01  -  12-25-19 @ 07:00  --------------------------------------------------------  IN: 4878.8 mL / OUT: 4350 mL / NET: 528.8 mL    12-25-19 @ 07:01  -  12-25-19 @ 12:51  --------------------------------------------------------  IN: 440 mL / OUT: 0 mL / NET: 440 mL        MEDICATIONS  acetaminophen  IVPB .. 750 milliGRAM(s) IV Intermittent once PRN  acyclovir   Oral Tab/Cap 400 milliGRAM(s) Oral three times a day  albuterol/ipratropium for Nebulization 3 milliLiter(s) Nebulizer every 6 hours  buDESOnide    Inhalation Suspension 0.5 milliGRAM(s) Inhalation every 12 hours  chlorhexidine 2% Cloths 1 Application(s) Topical <User Schedule>  diphenoxylate/atropine 2 Tablet(s) Oral <User Schedule>  fat emulsion (Fish Oil and Plant Based) 20% Infusion 20.8 mL/Hr IV Continuous <Continuous>  insulin lispro (HumaLOG) corrective regimen sliding scale   SubCutaneous every 6 hours  loperamide 8 milliGRAM(s) Oral <User Schedule>  metoprolol tartrate 12.5 milliGRAM(s) Oral every 6 hours  octreotide  Injectable 100 MICROGram(s) SubCutaneous three times a day  ondansetron Injectable 4 milliGRAM(s) IV Push every 6 hours PRN  opium Tincture 6 milliGRAM(s) Oral <User Schedule>  pantoprazole  Injectable 40 milliGRAM(s) IV Push daily  Parenteral Nutrition - Adult 1 Each TPN Continuous <Continuous>  Parenteral Nutrition - Adult 1 Each TPN Continuous <Continuous>  sodium chloride 0.9%. 1000 milliLiter(s) IV Continuous <Continuous>      LABS                        7.8    9.40  )-----------( 384      ( 25 Dec 2019 02:29 )             23.9     12-25    132<L>  |  101  |  17  ----------------------------<  180<H>  4.7   |  23  |  0.48<L>    Ca    7.5<L>      25 Dec 2019 02:29  Phos  4.2     12-25  Mg     2.2     12-25    TPro  5.3<L>  /  Alb  2.0<L>  /  TBili  0.3  /  DBili  0.2  /  AST  25  /  ALT  36  /  AlkPhos  124<H>  12-25          RADIOLOGY & ADDITIONAL STUDIES

## 2019-12-25 NOTE — PROGRESS NOTE ADULT - SUBJECTIVE AND OBJECTIVE BOX
HISTORY  74y Male presented 12/6 with abdominal pain, nausea, hematemesis, and poor PO intake for ~2-3 days. Labs significant for an CHI w/ Cr 2.74 and lactate of 9.4. He was also notably tachycardic to the 110s and hypotensive w/ SBP in the 70s. He was given 2 units of PRBCs and 2 L of LR in the ED due to concern for upper GI bleeding. Imaging revealed high grade SBO in the RLQ. Patient was taken to the OR emergently for an exploratory laparotomy, lysis of adhesions, decompression of bowel via enterotomy w/ primary repair, and Abthera VAC placement. Of note, the distal 50% of the bowel appeared dusky but viable. He required vasopressor support with phenylephrine and vasopressin infusions. He received 3000 mL of crystalloid w/ EBL of 10 mL and UOP of 25 mL. Patient was left intubated at the end of the case so SICU was consulted for hemodynamic monitoring. Taken back to the OR on 12/8 and underwent take down of Abthera, washout and re-application of the Abthera vac. Went back to OR on 12/10 night for colectomy.        24 HOUR EVENTS:  - Pig-tail catheter discontinued by thoracic surgery.  - Keeping patient NPO for 24 hrs to reasess ostomy output    SUBJECTIVE/ROS:  [ ] A ten-point review of systems was otherwise negative except as noted.  [ ] Due to altered mental status/intubation, subjective information were not able to be obtained from the patient. History was obtained, to the extent possible, from review of the chart and collateral sources of information.      NEURO  Exam: awake, alert, oriented  Meds: acetaminophen  IVPB .. 750 milliGRAM(s) IV Intermittent once PRN Mild Pain (1 - 3)  ondansetron Injectable 4 milliGRAM(s) IV Push every 6 hours PRN Nausea and/or Vomiting  opium Tincture 6 milliGRAM(s) Oral <User Schedule>    [x] Adequacy of sedation and pain control has been assessed and adjusted      RESPIRATORY  RR: 22 (12-25-19 @ 06:00) (20 - 41)  SpO2: 94% (12-25-19 @ 06:00) (79% - 100%)  Exam: unlabored, clear to auscultation bilaterally  Mechanical Ventilation: none  [N/A] Extubation Readiness Assessed  Meds: albuterol/ipratropium for Nebulization 3 milliLiter(s) Nebulizer every 6 hours  buDESOnide    Inhalation Suspension 0.5 milliGRAM(s) Inhalation every 12 hours        CARDIOVASCULAR  HR: 86 (12-25-19 @ 06:00) (86 - 109)  BP: 90/51 (12-25-19 @ 06:00) (84/49 - 136/64)  BP(mean): 65 (12-25-19 @ 06:00) (61 - 93)  Exam: regular rate and rhythm  Cardiac Rhythm: sinus  Perfusion     [x]Adequate   [ ]Inadequate  Mentation   [x]Normal       [ ]Reduced  Extremities  [x]Warm         [ ]Cool  Volume Status [ ]Hypervolemic [x]Euvolemic [ ]Hypovolemic  Meds: metoprolol tartrate 12.5 milliGRAM(s) Oral every 6 hours        GI/NUTRITION  Exam: soft, nontender, nondistended, incision C/D/I  Diet: NPO   Meds: diphenoxylate/atropine 2 Tablet(s) Oral <User Schedule>  loperamide 8 milliGRAM(s) Oral <User Schedule>  pantoprazole  Injectable 40 milliGRAM(s) IV Push daily      GENITOURINARY  I&O's Detail    12-24 @ 07:01  -  12-25 @ 07:00  --------------------------------------------------------  IN:    fat emulsion (Fish Oil and Plant Based) 20% Infusion: 228.8 mL    IV PiggyBack: 50 mL    lactated ringers.: 30 mL    sodium chloride 0.9%.: 2170 mL    TPN (Total Parenteral Nutrition): 2400 mL  Total IN: 4878.8 mL    OUT:    Ileostomy: 2000 mL    Incontinent per Condom Catheter: 2350 mL  Total OUT: 4350 mL    Total NET: 528.8 mL          12-25    132<L>  |  101  |  17  ----------------------------<  180<H>  4.7   |  23  |  0.48<L>    Ca    7.5<L>      25 Dec 2019 02:29  Phos  4.2     12-25  Mg     2.2     12-25    TPro  5.3<L>  /  Alb  2.0<L>  /  TBili  0.3  /  DBili  0.2  /  AST  25  /  ALT  36  /  AlkPhos  124<H>  12-25    [ ] Martinez catheter, indication: N/A  Meds: fat emulsion (Fish Oil and Plant Based) 20% Infusion 20.8 mL/Hr IV Continuous <Continuous>  Parenteral Nutrition - Adult 1 Each TPN Continuous <Continuous>  sodium chloride 0.9%. 1000 milliLiter(s) IV Continuous <Continuous>        HEMATOLOGIC  Meds:   [x] VTE Prophylaxis                        7.8    9.40  )-----------( 384      ( 25 Dec 2019 02:29 )             23.9       Transfusion     [ ] PRBC   [ ] Platelets   [ ] FFP   [ ] Cryoprecipitate      INFECTIOUS DISEASES  WBC Count: 9.40 K/uL (12-25 @ 02:29)    RECENT CULTURES:    Meds: acyclovir   Oral Tab/Cap 400 milliGRAM(s) Oral three times a day        ENDOCRINE  CAPILLARY BLOOD GLUCOSE      POCT Blood Glucose.: 130 mg/dL (25 Dec 2019 05:34)  POCT Blood Glucose.: 118 mg/dL (24 Dec 2019 22:56)  POCT Blood Glucose.: 114 mg/dL (24 Dec 2019 17:21)  POCT Blood Glucose.: 118 mg/dL (24 Dec 2019 12:31)    Meds: insulin lispro (HumaLOG) corrective regimen sliding scale   SubCutaneous every 6 hours  octreotide  Injectable 100 MICROGram(s) SubCutaneous three times a day        ACCESS DEVICES:  [x] Peripheral IV  [ ] Central Venous Line	[ ] R	[ ] L	[ ] IJ	[ ] Fem	[ ] SC	Placed:   [ ] Arterial Line		[ ] R	[ ] L	[ ] Fem	[ ] Rad	[ ] Ax	Placed:   [ ] PICC:					[ ] Mediport  [ ] Urinary Catheter, Date Placed:   [x] Necessity of urinary, arterial, and venous catheters discussed    OTHER MEDICATIONS:  chlorhexidine 2% Cloths 1 Application(s) Topical <User Schedule>      CODE STATUS: full code       IMAGING: < from: CT Chest No Cont (12.21.19 @ 19:39) >      < end of copied text >

## 2019-12-25 NOTE — PROGRESS NOTE ADULT - ATTENDING COMMENTS
Pt seen and examined with residents and PA on 12/25, agree with above.    1. 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 L PTX s/p pleural pigtail catheter, air leak resolved, now with L pleural effusion with LLL consolidation on my personal review of CT chest and today's CXR:  - Thoracic to discuss with IR    3. Oral lesions:  - Acyclovir for possible HSV    4. Mild hyponatremia:  - Stable

## 2019-12-25 NOTE — PROGRESS NOTE ADULT - ASSESSMENT
A/P: 74 year old male w/ PMH of COPD and EtOH dependence with PSH/o appy, prostate surgery, & spine surgery  septic shock and high grade SBO s/p exploratory laparotomy, lysis of adhesions, decompression of bowel via enterotomy w/ primary repair, and Abthera VAC placement on 12/6; s/p take down of Abthera, washout and re-application of the Abthera vac on 12/8, s/p SBR and end ileostomy on 12/10.   TPN consulted to assist w/ management of pt's nutrition in pt w/ prolonged hospital course now tolerating diet but has high Ileostomy output   -cont TPN at goal:  210g dextrose, 120g AA, 50g lipid in 2400cc total volume  -hyponatremia improving.  NaCl increased to 80 meq today  -uptrending Phos - NaPhos decreased to 45mmol today  -hypoglycemia - insulin removed from formula.  Please continue to check FS and cover with ISS  -daily BMP, Mg, Phos  -would check electrolytes from ostomy output if possible  -continue to monitor ostomy output and provide anti-motility agents    MARTINA King PA-C   TPN pager [phone], spectra [phone]  discussed with Dr. Chacko and Dr. MELISSA Siegel

## 2019-12-25 NOTE — PROGRESS NOTE ADULT - ASSESSMENT
74M s/p ex lap, MIRYAM, closure of enterotomy, abthera vac placement 12/8 early morning. RTOR for exlap/washout 12/9. s/p RTOR ex lap, resection of 150cm bowel, creation of colostomy. Course c/b spontaneous PTX s/p pigtail 12/15, pleuravac, pigtail removed 12/24.    Plan   - evaluated by speech and swallow who suggested dysphagia 1 necator diet and FEES when not NPO  - appreciate CT surgery consult  - c/w imodium, lomotil, and opium tincture, monitor ostomy output   - c/w 1:1 ostomy repletions    - TPN  - appreciate excellent SICU care    RED SURGERY  [id number] 74M s/p ex lap, MIRYAM, closure of enterotomy, abthera vac placement 12/8 early morning. RTOR for exlap/washout 12/9. s/p RTOR ex lap, resection of 150cm bowel, creation of colostomy. Course c/b spontaneous PTX s/p pigtail 12/15, pleuravac, pigtail removed 12/24.    Plan   - evaluated by speech and swallow who suggested dysphagia 1 necator diet and FEES when not NPO  - appreciate CT surgery consult f/u possible IR drainage   - c/w imodium, lomotil, and opium tincture, monitor ostomy output   - c/w 1:1 ostomy repletions    - TPN  - appreciate excellent SICU care    RED SURGERY  [id number]

## 2019-12-25 NOTE — PROGRESS NOTE ADULT - SUBJECTIVE AND OBJECTIVE BOX
[facility] NUTRITION SUPPORT / TPN -- FOLLOW UP NOTE  --------------------------------------------------------------------------------    24 hour events/subjective:  L sided pigtail removed.  Started on octreotide.  Made NPO to assess ostomy output without eating.  Denies N/V.  Pain controlled.  Eager to eat yet understands why he is currently NPO.    Diet:  Diet, NPO (12-24-19 @ 18:32)      PAST HISTORY  --------------------------------------------------------------------------------  No significant changes to PMH, PSH, FHx, SHx, unless otherwise noted    ALLERGIES & MEDICATIONS  --------------------------------------------------------------------------------  Allergies    No Known Allergies    Intolerances      Standing Inpatient Medications  acyclovir   Oral Tab/Cap 400 milliGRAM(s) Oral three times a day  albuterol/ipratropium for Nebulization 3 milliLiter(s) Nebulizer every 6 hours  buDESOnide    Inhalation Suspension 0.5 milliGRAM(s) Inhalation every 12 hours  chlorhexidine 2% Cloths 1 Application(s) Topical <User Schedule>  diphenoxylate/atropine 2 Tablet(s) Oral <User Schedule>  fat emulsion (Fish Oil and Plant Based) 20% Infusion 20.8 mL/Hr IV Continuous <Continuous>  insulin lispro (HumaLOG) corrective regimen sliding scale   SubCutaneous every 6 hours  loperamide 8 milliGRAM(s) Oral <User Schedule>  metoprolol tartrate 12.5 milliGRAM(s) Oral every 6 hours  octreotide  Injectable 100 MICROGram(s) SubCutaneous three times a day  opium Tincture 6 milliGRAM(s) Oral <User Schedule>  pantoprazole  Injectable 40 milliGRAM(s) IV Push daily  Parenteral Nutrition - Adult 1 Each TPN Continuous <Continuous>  Parenteral Nutrition - Adult 1 Each TPN Continuous <Continuous>  sodium chloride 0.9%. 1000 milliLiter(s) IV Continuous <Continuous>    PRN Inpatient Medications  acetaminophen  IVPB .. 750 milliGRAM(s) IV Intermittent once PRN  ondansetron Injectable 4 milliGRAM(s) IV Push every 6 hours PRN      VITALS/PHYSICAL EXAM  --------------------------------------------------------------------------------  T(C): 37.5 (12-25-19 @ 07:00), Max: 37.5 (12-25-19 @ 07:00)  HR: 87 (12-25-19 @ 11:19) (85 - 105)  BP: 78/52 (12-25-19 @ 10:00) (78/52 - 136/64)  RR: 18 (12-25-19 @ 10:00) (17 - 35)  SpO2: 100% (12-25-19 @ 11:19) (79% - 100%)  Wt(kg): --        12-24-19 @ 07:01  -  12-25-19 @ 07:00  --------------------------------------------------------  IN: 4878.8 mL / OUT: 4350 mL / NET: 528.8 mL    12-25-19 @ 07:01  -  12-25-19 @ 11:31  --------------------------------------------------------  IN: 440 mL / OUT: 0 mL / NET: 440 mL      Physical Exam:  	    Gen:  A&Ox3, lying comfortably in bed            HEENT: NC/AT, PERRL, supple neck, clear oropharynx, mucosa moist  	Pulm: decreased BS B/L  	Abd: (+) BS, softly distended, non tender,                    midline incision c/d/i                    ostomy with gas and stool, connected to rosas bag                 PICC dressing c/d/I   	Skin: Warm, without rashes, good turgor          LABS/STUDIES  --------------------------------------------------------------------------------              7.8    9.40  >-----------<  384      [12-25-19 @ 02:29]              23.9     132  |  101  |  17  ----------------------------<  180      [12-25-19 @ 02:29]  4.7   |  23  |  0.48        Ca     7.5     [12-25-19 @ 02:29]      Mg     2.2     [12-25-19 @ 02:29]      Phos  4.2     [12-25-19 @ 02:29]    TPro  5.3  /  Alb  2.0  /  TBili  0.3  /  DBili  0.2  /  AST  25  /  ALT  36  /  AlkPhos  124  [12-25-19 @ 02:29]          Ca ionized  Creatinine Trend:  POC glucoseGlucose, Serum: 180 mg/dL (12-25-19 @ 02:29)  Glucose, Serum: 109 mg/dL (12-24-19 @ 13:16)  CAPILLARY BLOOD GLUCOSE      POCT Blood Glucose.: 130 mg/dL (25 Dec 2019 05:34)  POCT Blood Glucose.: 118 mg/dL (24 Dec 2019 22:56)  POCT Blood Glucose.: 114 mg/dL (24 Dec 2019 17:21)  POCT Blood Glucose.: 118 mg/dL (24 Dec 2019 12:31)    PrealbuminPrealbumin, Serum: 13 mg/dL (12-25-19 @ 04:24)  Prealbumin, Serum: 14 mg/dL (12-24-19 @ 02:35)  Prealbumin, Serum: 14 mg/dL (12-23-19 @ 06:28)  Prealbumin, Serum: 15 mg/dL (12-20-19 @ 02:49)  Prealbumin, Serum: 5 mg/dL (12-13-19 @ 07:46)    Triglycerides

## 2019-12-25 NOTE — PROGRESS NOTE ADULT - SUBJECTIVE AND OBJECTIVE BOX
Subjective: Patient seen and examined. No new events except as noted.   Remains in ICU   Feels ok   24 HOUR EVENTS:  - Pig-tail catheter discontinued by thoracic surgery.  - Keeping patient NPO for 24 hrs to reasess ostomy output    REVIEW OF SYSTEMS:    CONSTITUTIONAL:+ weakness, fevers or chills  EYES/ENT: No visual changes;  No vertigo or throat pain   NECK: No pain or stiffness  RESPIRATORY: No cough, wheezing, hemoptysis; No shortness of breath  CARDIOVASCULAR: No chest pain or palpitations  GASTROINTESTINAL: No abdominal or epigastric pain. No nausea, vomiting, or hematemesis; No diarrhea or constipation. No melena or hematochezia.  GENITOURINARY: No dysuria, frequency or hematuria  NEUROLOGICAL: No numbness or weakness  SKIN: No itching, burning, rashes, or lesions   All other review of systems is negative unless indicated above.    MEDICATIONS:  MEDICATIONS  (STANDING):  acyclovir   Oral Tab/Cap 400 milliGRAM(s) Oral three times a day  albuterol/ipratropium for Nebulization 3 milliLiter(s) Nebulizer every 6 hours  buDESOnide    Inhalation Suspension 0.5 milliGRAM(s) Inhalation every 12 hours  chlorhexidine 2% Cloths 1 Application(s) Topical <User Schedule>  diphenoxylate/atropine 2 Tablet(s) Oral <User Schedule>  fat emulsion (Fish Oil and Plant Based) 20% Infusion 20.8 mL/Hr (20.8 mL/Hr) IV Continuous <Continuous>  insulin lispro (HumaLOG) corrective regimen sliding scale   SubCutaneous every 6 hours  loperamide 8 milliGRAM(s) Oral <User Schedule>  metoprolol tartrate 12.5 milliGRAM(s) Oral every 6 hours  octreotide  Injectable 100 MICROGram(s) SubCutaneous three times a day  opium Tincture 6 milliGRAM(s) Oral <User Schedule>  pantoprazole  Injectable 40 milliGRAM(s) IV Push daily  Parenteral Nutrition - Adult 1 Each (100 mL/Hr) TPN Continuous <Continuous>  Parenteral Nutrition - Adult 1 Each (100 mL/Hr) TPN Continuous <Continuous>  sodium chloride 0.9%. 1000 milliLiter(s) (10 mL/Hr) IV Continuous <Continuous>      PHYSICAL EXAM:  T(C): 37.5 (12-25-19 @ 07:00), Max: 37.5 (12-25-19 @ 07:00)  HR: 87 (12-25-19 @ 11:19) (85 - 105)  BP: 78/52 (12-25-19 @ 10:00) (78/52 - 136/64)  RR: 18 (12-25-19 @ 10:00) (17 - 35)  SpO2: 100% (12-25-19 @ 11:19) (79% - 100%)  Wt(kg): --  I&O's Summary    24 Dec 2019 07:01  -  25 Dec 2019 07:00  --------------------------------------------------------  IN: 4878.8 mL / OUT: 4350 mL / NET: 528.8 mL    25 Dec 2019 07:01  -  25 Dec 2019 12:26  --------------------------------------------------------  IN: 440 mL / OUT: 0 mL / NET: 440 mL            Appearance: NAD   HEENT:   Dry oral mucosa, crusted lips   Lymphatic: No lymphadenopathy   Cardiovascular: Normal S1 S2, No JVD, No murmurs , Peripheral pulses palpable 2+ bilaterally  Respiratory: Decreased bs   Gastrointestinal:  Soft, NT, ND. Ostomy pink with output. Midline staples in place, midline incision site is c/d/i   Skin: No rashes, No ecchymoses, No cyanosis, warm to touch  Musculoskeletal: Decreased  range of motion and strength  Psychiatry:  mildly sedated   Ext: No edema +PICC line   +rosas   +rectal tube       LABS:    CARDIAC MARKERS:                                7.8    9.40  )-----------( 384      ( 25 Dec 2019 02:29 )             23.9     12-25    132<L>  |  101  |  17  ----------------------------<  180<H>  4.7   |  23  |  0.48<L>    Ca    7.5<L>      25 Dec 2019 02:29  Phos  4.2     12-25  Mg     2.2     12-25    TPro  5.3<L>  /  Alb  2.0<L>  /  TBili  0.3  /  DBili  0.2  /  AST  25  /  ALT  36  /  AlkPhos  124<H>  12-25    proBNP:   Lipid Profile:   HgA1c:   TSH:             TELEMETRY: 	SR    ECG:  	  RADIOLOGY:   < from: Xray Chest 1 View- PORTABLE-Routine (12.25.19 @ 06:54) >    EXAM:  XR CHEST PORTABLE ROUTINE 1V                            PROCEDURE DATE:  12/25/2019            INTERPRETATION:  CLINICAL INFORMATION: Follow up left pleural effusion.    TECHNIQUE: Portable AP radiograph of the chest.    COMPARISON: PortableAP chest radiograph from 12/24/2019.    FINDINGS/  IMPRESSION:    Right upper extremity PICC with tip in the SVC.    Slightly increased left pleural effusion. Redemonstrated patchy right lower lung opacities may represent pneumonia. No pneumothorax.    Heart size cannot be accurately evaluated on this projection.                 MORGAN SANTIZO M.D., RESIDENT RADIOLOGIST  This document has been electronically signed.  RISHI TRIPLETT M.D. ATTENDING RADIOLOGIST  This document has been electronically signed. Dec 25 2019  9:36AM                < end of copied text >    DIAGNOSTIC TESTING:  [ ] Echocardiogram:  [ ]  Catheterization:  [ ] Stress Test:    OTHER:

## 2019-12-25 NOTE — PROGRESS NOTE ADULT - ASSESSMENT
75 y/o M presenting with septic shock and high grade SBO s/p exploratory laparotomy, lysis of adhesions, decompression of bowel via enterotomy w/ primary repair, and Abthera VAC placement on 12/6; s/p take down of Abthera, washout and re-application of the Abthera vac on 12/8. Now s/p SBR and end ileostomy on 12/10 acute respiratory distress now improving, Pneumothorax, hyperglycemia, delirium.    PLAN:    Neuro: A&Ox3   - tylenol PRN for pain control     Resp: acute respiratory failure upon initial presentation, COPD at baseline, spontaneous left sided pneumothorax s/p pig-tail catheter placement.   - Continue to monitor on RA  - Continue Duonebs, budesonide  - Consult IR for L chest tap  - F/u AM CXR for evaluation of PTX    CV: septic shock requiring vasopressor support, now hemodynamically stable   - Metoprolol 12.5 q6 hrs    GI: SBO s/p exploratory laparotomy, lysis of adhesions, decompression of bowel via enterotomy w/ primary repair, and Abthera VAC placement and removal with 150 cm of small bowel removed and end ileostomy.   - NPO with TPN (calorie count) /  - Monitor ostomy output, LR 1 : 1 repletions, lomotil 2 tabs q6hrs & Imodium 4 mg q6hrs & tincture of opium 6 mg q6hrs    Renal: CHI, resolved   - Replete electrolytes as needed  - Monitor I/Os    Heme: no acute issues  - Lovenox for VTE prophylaxis  - SCDs     ID: No active issues   - Trend WBC, monitor fever curve    Endo: no acute issues  - SSI, q6 fingersticks    Dispo: SICU full code    - Buster Bishop PA-C

## 2019-12-25 NOTE — PROGRESS NOTE ADULT - ASSESSMENT
75 y/o M presenting with septic shock and high grade SBO s/p exploratory laparotomy, lysis of adhesions, decompression of bowel via enterotomy w/ primary repair, and Abthera VAC placement on 12/6; s/p take down of Abthera, washout and re-application of the Abthera vac on 12/8. Now s/p SBR and end ileostomy on 12/10 acute respiratory distress now improving, Pneumothorax, hyperglycemia, delirium. Now still with persistent pneumothorax when chest pigtail clamped.   12/23 VSS on room air recommending IR drainage of left chest  12/24 No evidence of air leak to left chest tube. Tube clamped. Repeat chest cxr in 4 hours. Anticipate chest tube removal if lung remains expanded. Drainage of loculated left effusion discussed with IR. IR to reconsult for drainage once initial tube removed. Discussed plan with Dr Mayes and IR Fellow  12/24  On NC ,    VSSchest xray sm lt pl eff

## 2019-12-26 LAB
ALBUMIN SERPL ELPH-MCNC: 2.3 G/DL — LOW (ref 3.3–5)
ALP SERPL-CCNC: 130 U/L — HIGH (ref 40–120)
ALT FLD-CCNC: 34 U/L — SIGNIFICANT CHANGE UP (ref 10–45)
ANION GAP SERPL CALC-SCNC: 5 MMOL/L — SIGNIFICANT CHANGE UP (ref 5–17)
AST SERPL-CCNC: 19 U/L — SIGNIFICANT CHANGE UP (ref 10–40)
BILIRUB DIRECT SERPL-MCNC: 0.2 MG/DL — SIGNIFICANT CHANGE UP (ref 0–0.2)
BILIRUB INDIRECT FLD-MCNC: 0.1 MG/DL — LOW (ref 0.2–1)
BILIRUB SERPL-MCNC: 0.3 MG/DL — SIGNIFICANT CHANGE UP (ref 0.2–1.2)
BUN SERPL-MCNC: 17 MG/DL — SIGNIFICANT CHANGE UP (ref 7–23)
CALCIUM SERPL-MCNC: 8.2 MG/DL — LOW (ref 8.4–10.5)
CHLORIDE SERPL-SCNC: 100 MMOL/L — SIGNIFICANT CHANGE UP (ref 96–108)
CO2 SERPL-SCNC: 28 MMOL/L — SIGNIFICANT CHANGE UP (ref 22–31)
CREAT SERPL-MCNC: 0.54 MG/DL — SIGNIFICANT CHANGE UP (ref 0.5–1.3)
GLUCOSE BLDC GLUCOMTR-MCNC: 124 MG/DL — HIGH (ref 70–99)
GLUCOSE BLDC GLUCOMTR-MCNC: 132 MG/DL — HIGH (ref 70–99)
GLUCOSE BLDC GLUCOMTR-MCNC: 149 MG/DL — HIGH (ref 70–99)
GLUCOSE BLDC GLUCOMTR-MCNC: 150 MG/DL — HIGH (ref 70–99)
GLUCOSE SERPL-MCNC: 138 MG/DL — HIGH (ref 70–99)
HCT VFR BLD CALC: 26.8 % — LOW (ref 39–50)
HGB BLD-MCNC: 8.4 G/DL — LOW (ref 13–17)
MAGNESIUM SERPL-MCNC: 1.9 MG/DL — SIGNIFICANT CHANGE UP (ref 1.6–2.6)
MCHC RBC-ENTMCNC: 29.8 PG — SIGNIFICANT CHANGE UP (ref 27–34)
MCHC RBC-ENTMCNC: 31.3 GM/DL — LOW (ref 32–36)
MCV RBC AUTO: 95 FL — SIGNIFICANT CHANGE UP (ref 80–100)
NRBC # BLD: 0 /100 WBCS — SIGNIFICANT CHANGE UP (ref 0–0)
PHOSPHATE SERPL-MCNC: 3.8 MG/DL — SIGNIFICANT CHANGE UP (ref 2.5–4.5)
PLATELET # BLD AUTO: 410 K/UL — HIGH (ref 150–400)
POTASSIUM SERPL-MCNC: 5.3 MMOL/L — SIGNIFICANT CHANGE UP (ref 3.5–5.3)
POTASSIUM SERPL-SCNC: 5.3 MMOL/L — SIGNIFICANT CHANGE UP (ref 3.5–5.3)
PROT SERPL-MCNC: 6.1 G/DL — SIGNIFICANT CHANGE UP (ref 6–8.3)
RBC # BLD: 2.82 M/UL — LOW (ref 4.2–5.8)
RBC # FLD: 13.3 % — SIGNIFICANT CHANGE UP (ref 10.3–14.5)
SODIUM SERPL-SCNC: 133 MMOL/L — LOW (ref 135–145)
WBC # BLD: 9.9 K/UL — SIGNIFICANT CHANGE UP (ref 3.8–10.5)
WBC # FLD AUTO: 9.9 K/UL — SIGNIFICANT CHANGE UP (ref 3.8–10.5)

## 2019-12-26 PROCEDURE — 99232 SBSQ HOSP IP/OBS MODERATE 35: CPT

## 2019-12-26 PROCEDURE — 99231 SBSQ HOSP IP/OBS SF/LOW 25: CPT

## 2019-12-26 RX ORDER — INSULIN LISPRO 100/ML
VIAL (ML) SUBCUTANEOUS AT BEDTIME
Refills: 0 | Status: DISCONTINUED | OUTPATIENT
Start: 2019-12-26 | End: 2020-01-10

## 2019-12-26 RX ORDER — LOPERAMIDE HCL 2 MG
16 TABLET ORAL
Refills: 0 | Status: DISCONTINUED | OUTPATIENT
Start: 2019-12-26 | End: 2020-01-10

## 2019-12-26 RX ORDER — MAGNESIUM SULFATE 500 MG/ML
2 VIAL (ML) INJECTION ONCE
Refills: 0 | Status: COMPLETED | OUTPATIENT
Start: 2019-12-26 | End: 2019-12-26

## 2019-12-26 RX ORDER — I.V. FAT EMULSION 20 G/100ML
20.8 EMULSION INTRAVENOUS
Qty: 50 | Refills: 0 | Status: DISCONTINUED | OUTPATIENT
Start: 2019-12-26 | End: 2019-12-27

## 2019-12-26 RX ORDER — ELECTROLYTE SOLUTION,INJ
1 VIAL (ML) INTRAVENOUS
Refills: 0 | Status: DISCONTINUED | OUTPATIENT
Start: 2019-12-26 | End: 2019-12-26

## 2019-12-26 RX ORDER — SODIUM CHLORIDE 9 MG/ML
1000 INJECTION INTRAMUSCULAR; INTRAVENOUS; SUBCUTANEOUS
Refills: 0 | Status: DISCONTINUED | OUTPATIENT
Start: 2019-12-26 | End: 2019-12-27

## 2019-12-26 RX ORDER — INSULIN LISPRO 100/ML
VIAL (ML) SUBCUTANEOUS
Refills: 0 | Status: DISCONTINUED | OUTPATIENT
Start: 2019-12-26 | End: 2020-01-10

## 2019-12-26 RX ADMIN — OCTREOTIDE ACETATE 100 MICROGRAM(S): 200 INJECTION, SOLUTION INTRAVENOUS; SUBCUTANEOUS at 15:00

## 2019-12-26 RX ADMIN — Medication 400 MILLIGRAM(S): at 22:07

## 2019-12-26 RX ADMIN — MORPHINE 6 MILLIGRAM(S): 10 SOLUTION ORAL at 12:20

## 2019-12-26 RX ADMIN — PANTOPRAZOLE SODIUM 40 MILLIGRAM(S): 20 TABLET, DELAYED RELEASE ORAL at 12:16

## 2019-12-26 RX ADMIN — MORPHINE 6 MILLIGRAM(S): 10 SOLUTION ORAL at 22:07

## 2019-12-26 RX ADMIN — Medication 3 MILLILITER(S): at 17:38

## 2019-12-26 RX ADMIN — Medication 0.5 MILLIGRAM(S): at 17:38

## 2019-12-26 RX ADMIN — Medication 400 MILLIGRAM(S): at 05:38

## 2019-12-26 RX ADMIN — Medication 8 MILLIGRAM(S): at 09:01

## 2019-12-26 RX ADMIN — Medication 16 MILLIGRAM(S): at 22:08

## 2019-12-26 RX ADMIN — Medication 2 TABLET(S): at 17:04

## 2019-12-26 RX ADMIN — Medication 16 MILLIGRAM(S): at 12:07

## 2019-12-26 RX ADMIN — Medication 50 GRAM(S): at 04:00

## 2019-12-26 RX ADMIN — Medication 0.5 MILLIGRAM(S): at 04:20

## 2019-12-26 RX ADMIN — SODIUM CHLORIDE 10 MILLILITER(S): 9 INJECTION INTRAMUSCULAR; INTRAVENOUS; SUBCUTANEOUS at 05:39

## 2019-12-26 RX ADMIN — Medication 3 MILLILITER(S): at 04:20

## 2019-12-26 RX ADMIN — Medication 400 MILLIGRAM(S): at 15:00

## 2019-12-26 RX ADMIN — Medication 3 MILLILITER(S): at 12:53

## 2019-12-26 RX ADMIN — MORPHINE 6 MILLIGRAM(S): 10 SOLUTION ORAL at 17:04

## 2019-12-26 RX ADMIN — Medication 12.5 MILLIGRAM(S): at 00:19

## 2019-12-26 RX ADMIN — Medication 2 TABLET(S): at 12:20

## 2019-12-26 RX ADMIN — MORPHINE 6 MILLIGRAM(S): 10 SOLUTION ORAL at 09:01

## 2019-12-26 RX ADMIN — OCTREOTIDE ACETATE 100 MICROGRAM(S): 200 INJECTION, SOLUTION INTRAVENOUS; SUBCUTANEOUS at 05:39

## 2019-12-26 RX ADMIN — CHLORHEXIDINE GLUCONATE 1 APPLICATION(S): 213 SOLUTION TOPICAL at 05:39

## 2019-12-26 RX ADMIN — Medication 1 EACH: at 17:08

## 2019-12-26 RX ADMIN — ENOXAPARIN SODIUM 40 MILLIGRAM(S): 100 INJECTION SUBCUTANEOUS at 12:06

## 2019-12-26 RX ADMIN — Medication 12.5 MILLIGRAM(S): at 05:38

## 2019-12-26 RX ADMIN — Medication 12.5 MILLIGRAM(S): at 23:25

## 2019-12-26 RX ADMIN — SODIUM CHLORIDE 10 MILLILITER(S): 9 INJECTION INTRAMUSCULAR; INTRAVENOUS; SUBCUTANEOUS at 22:10

## 2019-12-26 RX ADMIN — Medication 12.5 MILLIGRAM(S): at 12:15

## 2019-12-26 RX ADMIN — OCTREOTIDE ACETATE 100 MICROGRAM(S): 200 INJECTION, SOLUTION INTRAVENOUS; SUBCUTANEOUS at 22:08

## 2019-12-26 RX ADMIN — Medication 2 TABLET(S): at 22:07

## 2019-12-26 RX ADMIN — Medication 16 MILLIGRAM(S): at 17:04

## 2019-12-26 RX ADMIN — I.V. FAT EMULSION 20.8 ML/HR: 20 EMULSION INTRAVENOUS at 17:07

## 2019-12-26 RX ADMIN — Medication 2 TABLET(S): at 09:01

## 2019-12-26 NOTE — PROGRESS NOTE ADULT - ASSESSMENT
A/P: 74 year old male w/ PMH of COPD and EtOH dependence with PSH/o appy, prostate surgery, & spine surgery  septic shock and high grade SBO s/p exploratory laparotomy, lysis of adhesions, decompression of bowel via enterotomy w/ primary repair, and Abthera VAC placement on 12/6; s/p take down of Abthera, washout and re-application of the Abthera vac on 12/8. Now s/p SBR and end ileostomy on 12/10.   TPN consulted to assist w/ management of pt's nutrition in pt w/ prolonged hospital course now tolerating diet but has high Ileostomy output     Continue TPN at full strength  pt w/ severe Protein-Calorie Malnutrition-  Started on Dysphagia 1 Pureed-Nectar Consistency Fluid     TPN GOAL:  120 grams amino acids (800ml 15% a.a.)  210 grams dextrose (300ml 70% dex)  50 grams SMOFlipid (250ml 20%IVFE @ 20.8ml/hr x 12 hours)  in 2400mL volume  Micronutrients: 10ml MVI, 3ml MTE-5    HypoNa & ELevated Bicarb- improving & will continue to monitor-         NaCl increased to 80mEq  Elevated K - decrease KCl from 120 to 60 mEq KCl in TPN          possibly due to Octreotide  HypoPhos resolving, now with rising Phos-  improving - with decrease of NaPHos to 45mMol   PICC w/dedicated port for only TPN - maintenance as per protocol  Strict Intake and Output- high ileostomy output -On TPN and replete as per SICU       replete with NS as per SICU continue to monitor       pt now on lomotil, imodium, tincture of opium, & Octreotide   Weights three times a week  Monitor BMP, Mg, Ionized Ca, Phosphorus daily  Continue to monitor Triglycerides weekly and Pre-albumin weekly  Hyperglycemia - improved - Insulin removed from TPN      Fingersticks to monitor glucose every 6 hours until stable, may be decreased to twice a day      ISS coverage - to be ordered by primary team  For IR drainage of Loculated Lt chest pleural effusion  Continue as per SICU / Surgery, will follow with you, D/w primary team    Andreina Hubbard PA-C  TPN team, pager [phone]  D/w Ana M Siegel A/P: 74 year old male w/ PMH of COPD and EtOH dependence with PSH/o appy, prostate surgery, & spine surgery  septic shock and high grade SBO s/p exploratory laparotomy, lysis of adhesions, decompression of bowel via enterotomy w/ primary repair, and Abthera VAC placement on 12/6; s/p take down of Abthera, washout and re-application of the Abthera vac on 12/8. Now s/p SBR and end ileostomy on 12/10.   TPN consulted to assist w/ management of pt's nutrition in pt w/ prolonged hospital course now tolerating diet but has high Ileostomy output     Continue TPN at full strength  pt w/ severe Protein-Calorie Malnutrition-  Started on Dysphagia 1 Pureed-Nectar Consistency Fluid     TPN GOAL:  120 grams amino acids (800ml 15% a.a.)  210 grams dextrose (300ml 70% dex)  50 grams SMOFlipid (250ml 20%IVFE @ 20.8ml/hr x 12 hours)  in 2400mL volume  Micronutrients: 10ml MVI, 3ml MTE-5    HypoNa & ELevated Bicarb- improving & will continue to monitor-         NaCl increased to 80mEq  Elevated K - decrease KCl from 120 to 60 mEq KCl in TPN          possibly due to Octreotide  HypoPhos resolving, now with rising Phos-  improving - with decrease of NaPHos to 45mMol   PICC w/dedicated port for only TPN - maintenance as per protocol  Strict Intake and Output- high ileostomy output -On TPN and replete as per SICU       replete with NS as per SICU continue to monitor       pt now on lomotil, imodium, tincture of opium, & Octreotide   Consider sending ostomy output for electrolyte evaluation 	  Weights three times a week  Monitor BMP, Mg, Ionized Ca, Phosphorus daily  Continue to monitor Triglycerides weekly and Pre-albumin weekly  Hyperglycemia - improved - Insulin removed from TPN      Fingersticks to monitor glucose every 6 hours until stable, may be decreased to twice a day      ISS coverage - to be ordered by primary team  For IR drainage of Loculated Lt chest pleural effusion  Continue as per SICU / Surgery, will follow with you, D/w primary team    Andreina Hubbard PA-C  TPN team, pager [phone]  D/w Ana M Siegel

## 2019-12-26 NOTE — PROGRESS NOTE ADULT - ASSESSMENT
73 y/o M presenting with septic shock and high grade SBO s/p exploratory laparotomy, lysis of adhesions, decompression of bowel via enterotomy w/ primary repair, and Abthera VAC placement on 12/6; s/p take down of Abthera, washout and re-application of the Abthera vac on 12/8. Now s/p SBR and end ileostomy on 12/10 acute respiratory distress now improving, Pneumothorax, hyperglycemia, delirium.    PLAN:    Neuro: A&Ox3   - tylenol PRN for pain control     Resp: acute respiratory failure upon initial presentation, COPD at baseline, spontaneous left sided pneumothorax s/p pig-tail catheter placement.   - Continue to monitor on RA  - Continue Duonebs, budesonide  - Consult IR for L chest tap as per thoracic  - F/u AM CXR for evaluation of PTX    CV: septic shock requiring vasopressor support, now hemodynamically stable   - Metoprolol 12.5 q6 hrs    GI: SBO s/p exploratory laparotomy, lysis of adhesions, decompression of bowel via enterotomy w/ primary repair, and Abthera VAC placement and removal with 150 cm of small bowel removed and end ileostomy.   - Regular diet with TPN (calorie count)  - Monitor ostomy output, LR 1 : 1 repletions, lomotil 2 tabs q6hrs & Imodium 4 mg q6hrs & tincture of opium 6 mg q6hrs    Renal: CHI, resolved   - Replete electrolytes as needed  - Monitor I/Os    Heme: no acute issues  - Lovenox for VTE prophylaxis  - SCDs     ID: No active issues   - Trend WBC, monitor fever curve    Endo: no acute issues  - SSI, q6 fingersticks    Dispo: SICU full code    Janelle Bah PA-C     [id number]

## 2019-12-26 NOTE — PROGRESS NOTE ADULT - SUBJECTIVE AND OBJECTIVE BOX
HISTORY:  74y Male presented 12/6 with abdominal pain, nausea, hematemesis, and poor PO intake for ~2-3 days. Labs significant for an CHI w/ Cr 2.74 and lactate of 9.4. He was also notably tachycardic to the 110s and hypotensive w/ SBP in the 70s. He was given 2 units of PRBCs and 2 L of LR in the ED due to concern for upper GI bleeding. Imaging revealed high grade SBO in the RLQ. Patient was taken to the OR emergently for an exploratory laparotomy, lysis of adhesions, decompression of bowel via enterotomy w/ primary repair, and Abthera VAC placement. Of note, the distal 50% of the bowel appeared dusky but viable. He required vasopressor support with phenylephrine and vasopressin infusions. He received 3000 mL of crystalloid w/ EBL of 10 mL and UOP of 25 mL. Patient was left intubated at the end of the case so SICU was consulted for hemodynamic monitoring. Taken back to the OR on 12/8 and underwent take down of Abthera, washout and re-application of the Abthera vac. Went back to OR on 12/10 night for colectomy. Post-op course complicated by short gut syndrome    24 HOUR EVENTS:  - Advanced to regular diet  - 500 mL bolus in the morning for SBP in the 70s with good response  - Potassium noted to be 5.4 in the afternoon, did not treat and potassium on repeat electrolytes was 5.3    SUBJECTIVE/ROS:  [x] A ten-point review of systems was otherwise negative except as noted.  [ ] Due to altered mental status/intubation, subjective information were not able to be obtained from the patient. History was obtained, to the extent possible, from review of the chart and collateral sources of information.    NEURO  Exam: awake, alert, oriented x4, no acute distress, no focal deficits  Meds: acetaminophen  IVPB .. 750 milliGRAM(s) IV Intermittent once PRN Mild Pain (1 - 3)  [x] Adequacy of sedation and pain control has been assessed and adjusted    RESPIRATORY  RR: 20 (12-26-19 @ 07:00) (17 - 34)  SpO2: 99% (12-26-19 @ 07:00) (91% - 100%)  Exam: clear to auscultation bilaterally  Mechanical Ventilation: no  [N/A] Extubation Readiness Assessed  Meds:  - albuterol/ipratropium for Nebulization 3 milliLiter(s) Nebulizer every 6 hours  - buDESOnide    Inhalation Suspension 0.5 milliGRAM(s) Inhalation every 12 hours    CARDIOVASCULAR  HR: 81 (12-26-19 @ 07:00) (81 - 104)  BP: 103/57 (12-26-19 @ 07:00) (74/43 - 162/79)  BP(mean): 74 (12-26-19 @ 07:00) (54 - 113)  Exam: regular rate and rhythm  Cardiac Rhythm: sinus  Perfusion     [x]Adequate   [ ]Inadequate  Mentation   [x]Normal       [ ]Reduced  Extremities  [x]Warm         [ ]Cool  Volume Status [ ]Hypervolemic [x]Euvolemic [ ]Hypovolemic  Meds: metoprolol tartrate 12.5 milliGRAM(s) Oral every 6 hours    GI/NUTRITION  Exam: soft, nontender, nondistended, incision C/D/I  Diet: dysphagia 1  Meds:  - diphenoxylate/atropine 2 Tablet(s) Oral <User Schedule>  - fat emulsion (Fish Oil and Plant Based) 20% Infusion 20.8 mL/Hr IV Continuous <Continuous>  - loperamide 8 milliGRAM(s) Oral <User Schedule>  - octreotide  Injectable 100 MICROGram(s) SubCutaneous three times a day  - ondansetron Injectable 4 milliGRAM(s) IV Push every 6 hours PRN Nausea and/or Vomiting  - opium Tincture 6 milliGRAM(s) Oral <User Schedule>  - pantoprazole  Injectable 40 milliGRAM(s) IV Push daily  - Parenteral Nutrition - Adult 1 Each TPN Continuous <Continuous>    GENITOURINARY  I&O's Detail    12-25 @ 07:01  -  12-26 @ 07:00  --------------------------------------------------------  IN:    fat emulsion (Fish Oil and Plant Based) 20% Infusion: 270.4 mL    Sodium Chloride 0.9% IV Bolus: 500 mL    sodium chloride 0.9%.: 230 mL    Solution: 1950 mL    Solution: 50 mL    TPN (Total Parenteral Nutrition): 2400 mL  Total IN: 5400.4 mL    OUT:    Ileostomy: 1950 mL    Incontinent per Condom Catheter: 1901 mL    Voided: 1250 mL  Total OUT: 5101 mL    Total NET: 299.4 mL    133<L>  |  100  |  17  ----------------------------<  138<H>  5.3   |  28  |  0.54    Ca    8.2<L>      26 Dec 2019 02:23  Phos  3.8  Mg     1.9  TPro  6.1  /  Alb  2.3<L>  /  TBili  0.3  /  DBili  0.2  /  AST  19  /  ALT  34  /  AlkPhos  130<H>    [ ] Martinez catheter, indication: N/A  Meds: sodium chloride 0.9% with 1:1 ostomy repletions    HEMATOLOGIC  Meds: enoxaparin Injectable 40 milliGRAM(s) SubCutaneous daily  [x] VTE Prophylaxis                        8.4    9.90  )-----------( 410      ( 26 Dec 2019 02:23 )             26.8     INFECTIOUS DISEASES  T(C): 37.3 (12-26-19 @ 03:00), Max: 37.5 (12-25-19 @ 11:00)  WBC Count: 9.90 K/uL (12-26 @ 02:23)  Recent Cultures: none  Meds: acyclovir   Oral Tab/Cap 400 milliGRAM(s) Oral three times a day    ENDOCRINE  Capillary Blood Glucose:  - 150 mg/dL (26 Dec 2019 05:35)  - 147 mg/dL (25 Dec 2019 23:04)  - 108 mg/dL (25 Dec 2019 17:37)  - 134 mg/dL (25 Dec 2019 12:04)  Meds: insulin lispro (HumaLOG) corrective regimen sliding scale   SubCutaneous every 6 hours    ACCESS DEVICES:  [x] Peripheral IV  [ ] Central Venous Line	[ ] R	[ ] L	[ ] IJ	[ ] Fem	[ ] SC	Placed:   [ ] Arterial Line		[ ] R	[ ] L	[ ] Fem	[ ] Rad	[ ] Ax	Placed:   [x] PICC:	RUE placed on 12/14			[ ] Mediport  [ ] Urinary Catheter, Date Placed:   [x] Necessity of urinary, arterial, and venous catheters discussed    OTHER MEDICATIONS: chlorhexidine 2% Cloths 1 Application(s) Topical <User Schedule>    CODE STATUS: Full code    IMAGING: HISTORY:  74y Male presented 12/6 with abdominal pain, nausea, hematemesis, and poor PO intake for ~2-3 days. Labs significant for an CHI w/ Cr 2.74 and lactate of 9.4. He was also notably tachycardic to the 110s and hypotensive w/ SBP in the 70s. He was given 2 units of PRBCs and 2 L of LR in the ED due to concern for upper GI bleeding. Imaging revealed high grade SBO in the RLQ. Patient was taken to the OR emergently for an exploratory laparotomy, lysis of adhesions, decompression of bowel via enterotomy w/ primary repair, and Abthera VAC placement. Of note, the distal 50% of the bowel appeared dusky but viable. He required vasopressor support with phenylephrine and vasopressin infusions. He received 3000 mL of crystalloid w/ EBL of 10 mL and UOP of 25 mL. Patient was left intubated at the end of the case so SICU was consulted for hemodynamic monitoring. Taken back to the OR on 12/8 and underwent take down of Abthera, washout and re-application of the Abthera vac. Went back to OR on 12/10 night for colectomy. Post-op course complicated by short gut syndrome    24 HOUR EVENTS:  - Advanced to regular diet  - 500 mL bolus in the morning for SBP in the 70s with good response  - Potassium noted to be 5.4 in the afternoon, did not treat and potassium on repeat electrolytes was 5.3    SUBJECTIVE/ROS:  [x] A ten-point review of systems was otherwise negative except as noted.  [ ] Due to altered mental status/intubation, subjective information were not able to be obtained from the patient. History was obtained, to the extent possible, from review of the chart and collateral sources of information.    GENERAL: no distress, cachectic    NEURO  Exam: awake, alert, oriented x4, no acute distress, no focal deficits  Meds: acetaminophen  IVPB .. 750 milliGRAM(s) IV Intermittent once PRN Mild Pain (1 - 3)  [x] Adequacy of sedation and pain control has been assessed and adjusted    RESPIRATORY  RR: 20 (12-26-19 @ 07:00) (17 - 34)  SpO2: 99% (12-26-19 @ 07:00) (91% - 100%)  Exam: clear to auscultation bilaterally  Mechanical Ventilation: no  [N/A] Extubation Readiness Assessed  Meds:  - albuterol/ipratropium for Nebulization 3 milliLiter(s) Nebulizer every 6 hours  - buDESOnide    Inhalation Suspension 0.5 milliGRAM(s) Inhalation every 12 hours    CARDIOVASCULAR  HR: 81 (12-26-19 @ 07:00) (81 - 104)  BP: 103/57 (12-26-19 @ 07:00) (74/43 - 162/79)  BP(mean): 74 (12-26-19 @ 07:00) (54 - 113)  Exam: regular rate and rhythm  Cardiac Rhythm: sinus  Perfusion     [x]Adequate   [ ]Inadequate  Mentation   [x]Normal       [ ]Reduced  Extremities  [x]Warm         [ ]Cool  Volume Status [ ]Hypervolemic [x]Euvolemic [ ]Hypovolemic  Meds: metoprolol tartrate 12.5 milliGRAM(s) Oral every 6 hours    GI/NUTRITION  Exam: soft, nontender, nondistended, incision C/D/I  Diet: dysphagia 1  Meds:  - diphenoxylate/atropine 2 Tablet(s) Oral <User Schedule>  - fat emulsion (Fish Oil and Plant Based) 20% Infusion 20.8 mL/Hr IV Continuous <Continuous>  - loperamide 8 milliGRAM(s) Oral <User Schedule>  - octreotide  Injectable 100 MICROGram(s) SubCutaneous three times a day  - ondansetron Injectable 4 milliGRAM(s) IV Push every 6 hours PRN Nausea and/or Vomiting  - opium Tincture 6 milliGRAM(s) Oral <User Schedule>  - pantoprazole  Injectable 40 milliGRAM(s) IV Push daily  - Parenteral Nutrition - Adult 1 Each TPN Continuous <Continuous>    GENITOURINARY  I&O's Detail    12-25 @ 07:01  -  12-26 @ 07:00  --------------------------------------------------------  IN:    fat emulsion (Fish Oil and Plant Based) 20% Infusion: 270.4 mL    Sodium Chloride 0.9% IV Bolus: 500 mL    sodium chloride 0.9%.: 230 mL    Solution: 1950 mL    Solution: 50 mL    TPN (Total Parenteral Nutrition): 2400 mL  Total IN: 5400.4 mL    OUT:    Ileostomy: 1950 mL    Incontinent per Condom Catheter: 1901 mL    Voided: 1250 mL  Total OUT: 5101 mL    Total NET: 299.4 mL    133<L>  |  100  |  17  ----------------------------<  138<H>  5.3   |  28  |  0.54    Ca    8.2<L>      26 Dec 2019 02:23  Phos  3.8  Mg     1.9  TPro  6.1  /  Alb  2.3<L>  /  TBili  0.3  /  DBili  0.2  /  AST  19  /  ALT  34  /  AlkPhos  130<H>    [ ] Martinez catheter, indication: N/A  Meds: sodium chloride 0.9% with 1:1 ostomy repletions    HEMATOLOGIC  Meds: enoxaparin Injectable 40 milliGRAM(s) SubCutaneous daily  [x] VTE Prophylaxis                        8.4    9.90  )-----------( 410      ( 26 Dec 2019 02:23 )             26.8     INFECTIOUS DISEASES  T(C): 37.3 (12-26-19 @ 03:00), Max: 37.5 (12-25-19 @ 11:00)  WBC Count: 9.90 K/uL (12-26 @ 02:23)  Recent Cultures: none  Meds: acyclovir   Oral Tab/Cap 400 milliGRAM(s) Oral three times a day    ENDOCRINE  Capillary Blood Glucose:  - 150 mg/dL (26 Dec 2019 05:35)  - 147 mg/dL (25 Dec 2019 23:04)  - 108 mg/dL (25 Dec 2019 17:37)  - 134 mg/dL (25 Dec 2019 12:04)  Meds: insulin lispro (HumaLOG) corrective regimen sliding scale   SubCutaneous every 6 hours    ACCESS DEVICES:  [x] Peripheral IV  [ ] Central Venous Line	[ ] R	[ ] L	[ ] IJ	[ ] Fem	[ ] SC	Placed:   [ ] Arterial Line		[ ] R	[ ] L	[ ] Fem	[ ] Rad	[ ] Ax	Placed:   [x] PICC:	RUE placed on 12/14			[ ] Mediport  [ ] Urinary Catheter, Date Placed:   [x] Necessity of urinary, arterial, and venous catheters discussed    OTHER MEDICATIONS: chlorhexidine 2% Cloths 1 Application(s) Topical <User Schedule>    CODE STATUS: Full code    IMAGING:

## 2019-12-26 NOTE — PROGRESS NOTE ADULT - SUBJECTIVE AND OBJECTIVE BOX
Surgery Progress Note  Patient is a 74y old  Male who presents with a chief complaint of abd. pain (26 Dec 2019 11:19)      SUBJECTIVE: Patient seen and examined at bedside with surgical team.  Denies nausea, vomiting, and abdominal pain   Continue to require NC      24 HOUR EVENTS:  - Advanced to regular diet  - 500 mL bolus in the morning for SBP in the 70s with good response  - Potassium noted to be 5.4 in the afternoon, did not treat and potassium on repeat electrolytes was 5.3    Vital Signs Last 24 Hrs  T(C): 36.9 (26 Dec 2019 08:00), Max: 37.5 (25 Dec 2019 15:00)  T(F): 98.4 (26 Dec 2019 08:00), Max: 99.5 (25 Dec 2019 15:00)  HR: 83 (26 Dec 2019 11:00) (80 - 104)  BP: 83/53 (26 Dec 2019 11:00) (74/43 - 162/79)  BP(mean): 63 (26 Dec 2019 11:00) (54 - 113)  RR: 21 (26 Dec 2019 11:00) (20 - 35)  SpO2: 99% (26 Dec 2019 11:00) (91% - 100%)    Physical Exam  Constitutional: NAD  Respiratory: breathing comfortably on NC   Abd: soft, nontender, nondistended, incision c/d/i, ostomy +/+  Ext: moving all 4 ext spontaneously     I&O's Detail    25 Dec 2019 07:01  -  26 Dec 2019 07:00  --------------------------------------------------------  IN:    fat emulsion (Fish Oil and Plant Based) 20% Infusion: 270.4 mL    sodium chloride 0.9%: 230 mL    Sodium Chloride 0.9% IV Bolus: 500 mL    Solution: 1950 mL    Solution: 50 mL    TPN (Total Parenteral Nutrition): 2400 mL  Total IN: 5400.4 mL    OUT:    Ileostomy: 1950 mL    Incontinent per Condom Catheter: 1901 mL    Voided: 1250 mL  Total OUT: 5101 mL    Total NET: 299.4 mL      26 Dec 2019 07:01  -  26 Dec 2019 12:03  --------------------------------------------------------  IN:    sodium chloride 0.9%: 40 mL    TPN (Total Parenteral Nutrition): 400 mL  Total IN: 440 mL    OUT:    Ileostomy: 750 mL    Incontinent per Condom Catheter: 700 mL  Total OUT: 1450 mL    Total NET: -1010 mL      MEDICATIONS  (STANDING):  acyclovir   Oral Tab/Cap 400 milliGRAM(s) Oral three times a day  albuterol/ipratropium for Nebulization 3 milliLiter(s) Nebulizer every 6 hours  buDESOnide    Inhalation Suspension 0.5 milliGRAM(s) Inhalation every 12 hours  chlorhexidine 2% Cloths 1 Application(s) Topical <User Schedule>  diphenoxylate/atropine 2 Tablet(s) Oral <User Schedule>  enoxaparin Injectable 40 milliGRAM(s) SubCutaneous daily  fat emulsion (Fish Oil and Plant Based) 20% Infusion 20.8 mL/Hr (20.8 mL/Hr) IV Continuous <Continuous>  insulin lispro (HumaLOG) corrective regimen sliding scale   SubCutaneous every 6 hours  loperamide 16 milliGRAM(s) Oral <User Schedule>  metoprolol tartrate 12.5 milliGRAM(s) Oral every 6 hours  octreotide  Injectable 100 MICROGram(s) SubCutaneous three times a day  opium Tincture 6 milliGRAM(s) Oral <User Schedule>  pantoprazole  Injectable 40 milliGRAM(s) IV Push daily  Parenteral Nutrition - Adult 1 Each (100 mL/Hr) TPN Continuous <Continuous>  sodium chloride 0.9%. 1000 milliLiter(s) (10 mL/Hr) IV Continuous <Continuous>    MEDICATIONS  (PRN):  acetaminophen  IVPB .. 750 milliGRAM(s) IV Intermittent once PRN Mild Pain (1 - 3)  ondansetron Injectable 4 milliGRAM(s) IV Push every 6 hours PRN Nausea and/or Vomiting      LABS:                        8.4    9.90  )-----------( 410      ( 26 Dec 2019 02:23 )             26.8     12-26    133<L>  |  100  |  17  ----------------------------<  138<H>  5.3   |  28  |  0.54    Ca    8.2<L>      26 Dec 2019 02:23  Phos  3.8     12-26  Mg     1.9     12-26    TPro  6.1  /  Alb  2.3<L>  /  TBili  0.3  /  DBili  0.2  /  AST  19  /  ALT  34  /  AlkPhos  130<H>  12-26      LIVER FUNCTIONS - ( 26 Dec 2019 02:23 )  Alb: 2.3 g/dL / Pro: 6.1 g/dL / ALK PHOS: 130 U/L / ALT: 34 U/L / AST: 19 U/L / GGT: x Surgery Progress Note  Patient is a 74y old  Male who presents with a chief complaint of abd. pain (26 Dec 2019 11:19)      SUBJECTIVE: Patient seen and examined at bedside with surgical team.  Tachycardic overnight   Denies nausea, vomiting, and abdominal pain   Continues to require NC      24 HOUR EVENTS:  - Advanced to regular diet  - 500 mL bolus in the morning for SBP in the 70s with good response  - Potassium noted to be 5.4 in the afternoon, did not treat and potassium on repeat electrolytes was 5.3    Vital Signs Last 24 Hrs  T(C): 36.9 (26 Dec 2019 08:00), Max: 37.5 (25 Dec 2019 15:00)  T(F): 98.4 (26 Dec 2019 08:00), Max: 99.5 (25 Dec 2019 15:00)  HR: 83 (26 Dec 2019 11:00) (80 - 104)  BP: 83/53 (26 Dec 2019 11:00) (74/43 - 162/79)  BP(mean): 63 (26 Dec 2019 11:00) (54 - 113)  RR: 21 (26 Dec 2019 11:00) (20 - 35)  SpO2: 99% (26 Dec 2019 11:00) (91% - 100%)    Physical Exam  Constitutional: NAD  Respiratory: breathing comfortably on NC   Abd: soft, nontender, nondistended, incision c/d/i, ostomy +/+  Ext: moving all 4 ext spontaneously     I&O's Detail    25 Dec 2019 07:01  -  26 Dec 2019 07:00  --------------------------------------------------------  IN:    fat emulsion (Fish Oil and Plant Based) 20% Infusion: 270.4 mL    sodium chloride 0.9%: 230 mL    Sodium Chloride 0.9% IV Bolus: 500 mL    Solution: 1950 mL    Solution: 50 mL    TPN (Total Parenteral Nutrition): 2400 mL  Total IN: 5400.4 mL    OUT:    Ileostomy: 1950 mL    Incontinent per Condom Catheter: 1901 mL    Voided: 1250 mL  Total OUT: 5101 mL    Total NET: 299.4 mL      26 Dec 2019 07:01  -  26 Dec 2019 12:03  --------------------------------------------------------  IN:    sodium chloride 0.9%: 40 mL    TPN (Total Parenteral Nutrition): 400 mL  Total IN: 440 mL    OUT:    Ileostomy: 750 mL    Incontinent per Condom Catheter: 700 mL  Total OUT: 1450 mL    Total NET: -1010 mL      MEDICATIONS  (STANDING):  acyclovir   Oral Tab/Cap 400 milliGRAM(s) Oral three times a day  albuterol/ipratropium for Nebulization 3 milliLiter(s) Nebulizer every 6 hours  buDESOnide    Inhalation Suspension 0.5 milliGRAM(s) Inhalation every 12 hours  chlorhexidine 2% Cloths 1 Application(s) Topical <User Schedule>  diphenoxylate/atropine 2 Tablet(s) Oral <User Schedule>  enoxaparin Injectable 40 milliGRAM(s) SubCutaneous daily  fat emulsion (Fish Oil and Plant Based) 20% Infusion 20.8 mL/Hr (20.8 mL/Hr) IV Continuous <Continuous>  insulin lispro (HumaLOG) corrective regimen sliding scale   SubCutaneous every 6 hours  loperamide 16 milliGRAM(s) Oral <User Schedule>  metoprolol tartrate 12.5 milliGRAM(s) Oral every 6 hours  octreotide  Injectable 100 MICROGram(s) SubCutaneous three times a day  opium Tincture 6 milliGRAM(s) Oral <User Schedule>  pantoprazole  Injectable 40 milliGRAM(s) IV Push daily  Parenteral Nutrition - Adult 1 Each (100 mL/Hr) TPN Continuous <Continuous>  sodium chloride 0.9%. 1000 milliLiter(s) (10 mL/Hr) IV Continuous <Continuous>    MEDICATIONS  (PRN):  acetaminophen  IVPB .. 750 milliGRAM(s) IV Intermittent once PRN Mild Pain (1 - 3)  ondansetron Injectable 4 milliGRAM(s) IV Push every 6 hours PRN Nausea and/or Vomiting      LABS:                        8.4    9.90  )-----------( 410      ( 26 Dec 2019 02:23 )             26.8     12-26    133<L>  |  100  |  17  ----------------------------<  138<H>  5.3   |  28  |  0.54    Ca    8.2<L>      26 Dec 2019 02:23  Phos  3.8     12-26  Mg     1.9     12-26    TPro  6.1  /  Alb  2.3<L>  /  TBili  0.3  /  DBili  0.2  /  AST  19  /  ALT  34  /  AlkPhos  130<H>  12-26      LIVER FUNCTIONS - ( 26 Dec 2019 02:23 )  Alb: 2.3 g/dL / Pro: 6.1 g/dL / ALK PHOS: 130 U/L / ALT: 34 U/L / AST: 19 U/L / GGT: x

## 2019-12-26 NOTE — PROGRESS NOTE ADULT - ASSESSMENT
73 y/o M presenting with septic shock and high grade SBO s/p exploratory laparotomy, lysis of adhesions, decompression of bowel via enterotomy w/ primary repair, and Abthera VAC placement on 12/6; s/p take down of Abthera, washout and re-application of the Abthera vac on 12/8. Now s/p SBR and end ileostomy on 12/10 acute respiratory distress now improving, Pneumothorax, hyperglycemia, delirium. Now still with persistent pneumothorax when chest pigtail clamped.   12/23 VSS on room air recommending IR drainage of left chest  12/24 No evidence of air leak to left chest tube. Tube clamped. Repeat chest cxr in 4 hours. Anticipate chest tube removal if lung remains expanded. Drainage of loculated left effusion discussed with IR. IR to reconsult for drainage once initial tube removed. Discussed plan with Dr Mayes and IR Fellow  12/24  On NC ,    VSSchest xray sm lt pl eff  12/26    2l NC   co  sob,  lt side diminshed

## 2019-12-26 NOTE — PROGRESS NOTE ADULT - ATTENDING COMMENTS
Pt seen and examined with residents and PA, agree with above.    1. High-output ileostomy due to short gut syndrome:  - Continue lomotil, imodium, and tincture of opium: increased dose of lomotil and imodium (usual dose parameters can be exceeded per expert guidelines in short gut syndrome for lomotil and imodium)  - Continue TPN  - PPI to decrease gastric secretions  - Decrease fluid replacements given high urine output  - Octreotide started, may take 5-7 days to see effect    2. L PTX s/p pleural pigtail catheter, air leak resolved, now with L pleural effusion with LLL consolidation on my personal review of CT chest and today's CXR:  - Thoracic to discuss with IR    3. Oral lesions:  - Acyclovir for possible HSV    4. Mild hyponatremia:  - Stable, check BMP daily

## 2019-12-26 NOTE — PROGRESS NOTE ADULT - SUBJECTIVE AND OBJECTIVE BOX
VITAL SIGNS      Vital Signs Last 24 Hrs  T(C): 36.9 (19 @ 08:00), Max: 37.5 (19 @ 15:00)  T(F): 98.4 (19 @ 08:00), Max: 99.5 (19 @ 15:00)  HR: 86 (19 @ 13:00) (80 - 104)  BP: 83/53 (19 @ 11:00) (83/53 - 162/79)  RR: 21 (19 @ 11:00) (20 - 35)  SpO2: 100% (19 @ 13:00) (91% - 100%)                   Daily     Daily Weight in k.6 (26 Dec 2019 00:18)      Bilirubin Direct, Serum: 0.2 mg/dL ( @ 02:23)  Bilirubin Total, Serum: 0.3 mg/dL ( @ 02:23)    CAPILLARY BLOOD GLUCOSE      POCT Blood Glucose.: 124 mg/dL (26 Dec 2019 12:03)  POCT Blood Glucose.: 150 mg/dL (26 Dec 2019 05:35)  POCT Blood Glucose.: 147 mg/dL (25 Dec 2019 23:04)  POCT Blood Glucose.: 108 mg/dL (25 Dec 2019 17:37)                        PHYSICAL EXAM    Neurology: alert and oriented x 3, moves all extremities with no defecits  CV :  RRR    Lungs: lt side diminshed  Abdomen: soft, nontender, nondistended, positive bowel sounds, + ostomy  Extremities:       rosas   cachetic

## 2019-12-26 NOTE — PROGRESS NOTE ADULT - SUBJECTIVE AND OBJECTIVE BOX
[facility] NUTRITION SUPPORT / TPN -- FOLLOW UP NOTE  --------------------------------------------------------------------------------    24 hour events/subjective:  74y Male with a past medical history of COPD and EtOH dependence who presented 12/6 with abdominal pain, nausea, hematemesis, and poor PO intake for ~2-3 days. Labs significant for an CHI w/ Cr 2.74 and lactate of 9.4. He was also notably tachycardic to the 110s and hypotensive w/ SBP in the 70s. He was given 2 units of PRBCs and 2 L of LR in the ED due to concern for upper GI bleeding. Imaging revealed high grade SBO in the RLQ. Patient was taken to the OR emergently for an exploratory laparotomy, lysis of adhesions, decompression of bowel via enterotomy w/ primary repair, and Abthera VAC placement. Of note, the distal 50% of the bowel appeared dusky but viable. He required vasopressor support with phenylephrine and vasopressin infusions. He received 3000 mL of crystalloid w/ EBL of 10 mL and UOP of 25 mL. Patient was left intubated at the end of the case so SICU was consulted for hemodynamic monitoring. Taken back to the OR on 12/8 and underwent take down of Abthera, washout and re-application of the Abthera vac. Went back to OR on 12/10 night for colectomy with end ileostomy/ mucous fistula.    Pt w/ Lt PTX - w/ resolution of air leak         CTS to follow up w/ IR re drainage of loculated left effusion  Pt w/o pain  Blisters on lips less painful/ dry  no n/v   Trial of dysphagia diet - see if helps with w/ High ostomy output -        Increased doses of lomotil, Imodium, & tincture of opium        Continue to monitor for aspiration- defer FEES  No f/c/s  no cp/palp/ sob/dyspnea/ cough/ abdominal pain      Diet:  Diet, Dysphagia 1 Pureed-Nectar Consistency Fluid (12-25-19 @ 15:39)      Appetite: [  ]Poor [ x ]Adequate [  ]Good  Caloric intake:  [ x  ]  Adequate   [   ] Inadequate    ROS: General/ GI see HPI  all other systems negative      ALLERGIES & MEDICATIONS  --------------------------------------------------------------------------------  No Known Allergies      STANDING INPATIENT MEDICATIONS    acyclovir   Oral Tab/Cap 400 milliGRAM(s) Oral three times a day  albuterol/ipratropium for Nebulization 3 milliLiter(s) Nebulizer every 6 hours  buDESOnide    Inhalation Suspension 0.5 milliGRAM(s) Inhalation every 12 hours  chlorhexidine 2% Cloths 1 Application(s) Topical <User Schedule>  diphenoxylate/atropine 2 Tablet(s) Oral <User Schedule>  enoxaparin Injectable 40 milliGRAM(s) SubCutaneous daily  fat emulsion (Fish Oil and Plant Based) 20% Infusion 20.8 mL/Hr IV Continuous <Continuous>  insulin lispro (HumaLOG) corrective regimen sliding scale   SubCutaneous every 6 hours  loperamide 16 milliGRAM(s) Oral <User Schedule>  metoprolol tartrate 12.5 milliGRAM(s) Oral every 6 hours  octreotide  Injectable 100 MICROGram(s) SubCutaneous three times a day  opium Tincture 6 milliGRAM(s) Oral <User Schedule>  pantoprazole  Injectable 40 milliGRAM(s) IV Push daily  Parenteral Nutrition - Adult 1 Each TPN Continuous <Continuous>  Parenteral Nutrition - Adult 1 Each TPN Continuous <Continuous>  sodium chloride 0.9%. 1000 milliLiter(s) IV Continuous <Continuous>      PRN INPATIENT MEDICATION  acetaminophen  IVPB .. 750 milliGRAM(s) IV Intermittent once PRN  ondansetron Injectable 4 milliGRAM(s) IV Push every 6 hours PRN        VITALS/PHYSICAL EXAM  --------------------------------------------------------------------------------  T(C): 36.9 (12-26-19 @ 14:00), Max: 37.4 (12-25-19 @ 19:00)  HR: 80 (12-26-19 @ 17:39) (80 - 104)  BP: 90/50 (12-26-19 @ 17:00) (83/53 - 162/79)  RR: 23 (12-26-19 @ 17:00) (20 - 35)  SpO2: 100% (12-26-19 @ 17:39) (86% - 100%)  Wt(kg): --        12-25-19 @ 07:01  -  12-26-19 @ 07:00  --------------------------------------------------------  IN: 5400.4 mL / OUT: 5101 mL / NET: 299.4 mL    12-26-19 @ 07:01  -  12-26-19 @ 17:46  --------------------------------------------------------  IN: 2005.8 mL / OUT: 1900 mL / NET: 105.8 mL      PHYSICAL EXAM  --------------------------------------------------------------------------------  	Gen: guarded but stable, A&Ox3  	HEENT: NC/AT, PERRL, supple neck, clear oropharynx, dried ruptured blisters  	GI: (+) BS, softly distended, non tender,                    midline incision w/staples c/d/i w/o s/sx infection                   (+)ostomy pink viable- thick bilious liquid stool like material              MSK: FROM, no contractures nor deformities  	Vascular: Equally Warm, no clubbing, cyanosis, nor edema                        Rt PICC dressing c/d/I   	Neuro: No focal deficits, intact sensation, weakened strength  	Psych: Normal affect and mood  	Skin: Warm, without rashes, good turgor      LABS/ CULTURES/ RADIOLOGY:              8.4    9.90  >-----------<  410      [12-26-19 @ 02:23]              26.8     133  |  100  |  17  ----------------------------<  138      [12-26-19 @ 02:23]  5.3   |  28  |  0.54        Ca     8.2     [12-26-19 @ 02:23]      Mg     1.9     [12-26-19 @ 02:23]      Phos  3.8     [12-26-19 @ 02:23]    TPro  6.1  /  Alb  2.3  /  TBili  0.3  /  DBili  0.2  /  AST  19  /  ALT  34  /  AlkPhos  130  [12-26-19 @ 02:23]    Blood Gas Calcium, Ionized - Venous: 1.10 mmoL/L (12.21.19 @ 13:33)      CAPILLARY BLOOD GLUCOSE  POCT Blood Glucose.: 132 mg/dL (26 Dec 2019 17:00)  POCT Blood Glucose.: 124 mg/dL (26 Dec 2019 12:03)  POCT Blood Glucose.: 150 mg/dL (26 Dec 2019 05:35)  POCT Blood Glucose.: 147 mg/dL (25 Dec 2019 23:04)    Prealbumin, Serum: 13 mg/dL (12-25-19 @ 04:24)  Prealbumin, Serum: 14 mg/dL (12-24-19 @ 02:35)  Prealbumin, Serum: 14 mg/dL (12-23-19 @ 06:28)  Prealbumin, Serum: 15 mg/dL (12-20-19 @ 02:49)  Prealbumin, Serum: 5 mg/dL (12-13-19 @ 07:46)      Triglycerides, Serum: 75 mg/dL (12.25.19 @ 02:29)  Triglycerides, Serum: 69 mg/dL (12.24.19 @ 01:10)  Triglycerides, Serum: 73 mg/dL (12.23.19 @ 00:50)

## 2019-12-26 NOTE — PROGRESS NOTE ADULT - ASSESSMENT
Assessment	  74M s/p ex lap, MIRYAM, closure of enterotomy, abthera vac placement 12/8 early morning. RTOR for exlap/washout 12/9. s/p RTOR ex lap, resection of 150cm bowel, creation of colostomy. Course c/b spontaneous PTX s/p pigtail 12/15, pleuravac, pigtail removed 12/24.    Plan   - evaluated by speech and swallow who suggested dysphagia 1 necator diet and FEES when not NPO  - f/u CT surgery consult with IR for possible drainage   - c/w imodium, lomotil, and opium tincture, monitor ostomy output   - c/w 1:1 ostomy repletions    - TPN  - appreciate excellent SICU care    RED SURGERY  [id number] Assessment	  74M s/p ex lap, MIRYAM, closure of enterotomy, abthera vac placement 12/8 early morning. RTOR for exlap/washout 12/9. s/p RTOR ex lap, resection of 150cm bowel, creation of colostomy. Course c/b spontaneous PTX s/p pigtail 12/15, pleuravac, pigtail removed 12/24.    Plan   - continue to monitor HR and ileostomy op   - evaluated by speech and swallow who suggested dysphagia 1 necator diet and FEES when not NPO  - f/u CT surgery consult with IR for possible drainage   - c/w imodium, lomotil, and opium tincture, monitor ostomy output   - c/w 1:1 ostomy repletions    - TPN  - appreciate excellent SICU care    RED SURGERY  [id number]

## 2019-12-26 NOTE — PROGRESS NOTE ADULT - SUBJECTIVE AND OBJECTIVE BOX
Subjective: Patient seen and examined. No new events except as noted.   remains in ICU   feels ok   no cp or sob   24 HOUR EVENTS:  - Advanced to regular diet  - 500 mL bolus in the morning for SBP in the 70s with good response  - Potassium noted to be 5.4 in the afternoon, did not treat and potassium on repeat electrolytes was 5.3    REVIEW OF SYSTEMS:    CONSTITUTIONAL: + weakness, fevers or chills  EYES/ENT: No visual changes;  No vertigo or throat pain   NECK: No pain or stiffness  RESPIRATORY: No cough, wheezing, hemoptysis; No shortness of breath  CARDIOVASCULAR: No chest pain or palpitations  GASTROINTESTINAL: No abdominal or epigastric pain. No nausea, vomiting, or hematemesis; No diarrhea or constipation. No melena or hematochezia.  GENITOURINARY: No dysuria, frequency or hematuria  NEUROLOGICAL: No numbness or weakness  SKIN: No itching, burning, rashes, or lesions   All other review of systems is negative unless indicated above.    MEDICATIONS:  MEDICATIONS  (STANDING):  acyclovir   Oral Tab/Cap 400 milliGRAM(s) Oral three times a day  albuterol/ipratropium for Nebulization 3 milliLiter(s) Nebulizer every 6 hours  buDESOnide    Inhalation Suspension 0.5 milliGRAM(s) Inhalation every 12 hours  chlorhexidine 2% Cloths 1 Application(s) Topical <User Schedule>  diphenoxylate/atropine 2 Tablet(s) Oral <User Schedule>  enoxaparin Injectable 40 milliGRAM(s) SubCutaneous daily  fat emulsion (Fish Oil and Plant Based) 20% Infusion 20.8 mL/Hr (20.8 mL/Hr) IV Continuous <Continuous>  insulin lispro (HumaLOG) corrective regimen sliding scale   SubCutaneous every 6 hours  metoprolol tartrate 12.5 milliGRAM(s) Oral every 6 hours  octreotide  Injectable 100 MICROGram(s) SubCutaneous three times a day  opium Tincture 6 milliGRAM(s) Oral <User Schedule>  pantoprazole  Injectable 40 milliGRAM(s) IV Push daily  Parenteral Nutrition - Adult 1 Each (100 mL/Hr) TPN Continuous <Continuous>  sodium chloride 0.9%. 1000 milliLiter(s) (10 mL/Hr) IV Continuous <Continuous>      PHYSICAL EXAM:  T(C): 36.9 (12-26-19 @ 08:00), Max: 37.5 (12-25-19 @ 15:00)  HR: 83 (12-26-19 @ 11:00) (80 - 104)  BP: 83/53 (12-26-19 @ 11:00) (74/43 - 162/79)  RR: 21 (12-26-19 @ 11:00) (20 - 35)  SpO2: 99% (12-26-19 @ 11:00) (91% - 100%)  Wt(kg): --  I&O's Summary    25 Dec 2019 07:01  -  26 Dec 2019 07:00  --------------------------------------------------------  IN: 5400.4 mL / OUT: 5101 mL / NET: 299.4 mL    26 Dec 2019 07:01  -  26 Dec 2019 11:20  --------------------------------------------------------  IN: 440 mL / OUT: 1450 mL / NET: -1010 mL          Appearance: NAD   HEENT:   Dry oral mucosa, crusted lips   Lymphatic: No lymphadenopathy   Cardiovascular: Normal S1 S2, No JVD, No murmurs , Peripheral pulses palpable 2+ bilaterally  Respiratory: Decreased bs   Gastrointestinal:  Soft, NT, ND. Ostomy pink with output. Midline staples in place, midline incision site is c/d/i   Skin: No rashes, No ecchymoses, No cyanosis, warm to touch  Musculoskeletal: Decreased  range of motion and strength  Psychiatry:  mildly sedated   Ext: No edema +PICC line   +rosas   +rectal tube     LABS:    CARDIAC MARKERS:                                8.4    9.90  )-----------( 410      ( 26 Dec 2019 02:23 )             26.8     12-26    133<L>  |  100  |  17  ----------------------------<  138<H>  5.3   |  28  |  0.54    Ca    8.2<L>      26 Dec 2019 02:23  Phos  3.8     12-26  Mg     1.9     12-26    TPro  6.1  /  Alb  2.3<L>  /  TBili  0.3  /  DBili  0.2  /  AST  19  /  ALT  34  /  AlkPhos  130<H>  12-26    proBNP:   Lipid Profile:   HgA1c:   TSH:             TELEMETRY: 	SR    ECG:  	  RADIOLOGY:   DIAGNOSTIC TESTING:  [ ] Echocardiogram:  [ ]  Catheterization:  [ ] Stress Test:    OTHER:

## 2019-12-26 NOTE — PROGRESS NOTE ADULT - PROBLEM SELECTOR PLAN 1
moderate fluid to left lung and recommend   IR to tap for drainage.  call made to IR this am  Will continue to follow  Care as per primary team

## 2019-12-27 LAB
ALBUMIN SERPL ELPH-MCNC: 2.1 G/DL — LOW (ref 3.3–5)
ALP SERPL-CCNC: 106 U/L — SIGNIFICANT CHANGE UP (ref 40–120)
ALT FLD-CCNC: 25 U/L — SIGNIFICANT CHANGE UP (ref 10–45)
ANION GAP SERPL CALC-SCNC: 8 MMOL/L — SIGNIFICANT CHANGE UP (ref 5–17)
AST SERPL-CCNC: 13 U/L — SIGNIFICANT CHANGE UP (ref 10–40)
BILIRUB DIRECT SERPL-MCNC: 0.2 MG/DL — SIGNIFICANT CHANGE UP (ref 0–0.2)
BILIRUB INDIRECT FLD-MCNC: 0.1 MG/DL — LOW (ref 0.2–1)
BILIRUB SERPL-MCNC: 0.3 MG/DL — SIGNIFICANT CHANGE UP (ref 0.2–1.2)
BUN SERPL-MCNC: 15 MG/DL — SIGNIFICANT CHANGE UP (ref 7–23)
CALCIUM SERPL-MCNC: 7.5 MG/DL — LOW (ref 8.4–10.5)
CHLORIDE SERPL-SCNC: 103 MMOL/L — SIGNIFICANT CHANGE UP (ref 96–108)
CO2 SERPL-SCNC: 24 MMOL/L — SIGNIFICANT CHANGE UP (ref 22–31)
CREAT SERPL-MCNC: 0.48 MG/DL — LOW (ref 0.5–1.3)
GLUCOSE BLDC GLUCOMTR-MCNC: 118 MG/DL — HIGH (ref 70–99)
GLUCOSE BLDC GLUCOMTR-MCNC: 131 MG/DL — HIGH (ref 70–99)
GLUCOSE BLDC GLUCOMTR-MCNC: 132 MG/DL — HIGH (ref 70–99)
GLUCOSE BLDC GLUCOMTR-MCNC: 145 MG/DL — HIGH (ref 70–99)
GLUCOSE SERPL-MCNC: 152 MG/DL — HIGH (ref 70–99)
HCT VFR BLD CALC: 23.1 % — LOW (ref 39–50)
HGB BLD-MCNC: 7.3 G/DL — LOW (ref 13–17)
MAGNESIUM SERPL-MCNC: 1.7 MG/DL — SIGNIFICANT CHANGE UP (ref 1.6–2.6)
MCHC RBC-ENTMCNC: 29.9 PG — SIGNIFICANT CHANGE UP (ref 27–34)
MCHC RBC-ENTMCNC: 31.6 GM/DL — LOW (ref 32–36)
MCV RBC AUTO: 94.7 FL — SIGNIFICANT CHANGE UP (ref 80–100)
NRBC # BLD: 0 /100 WBCS — SIGNIFICANT CHANGE UP (ref 0–0)
PHOSPHATE SERPL-MCNC: 3 MG/DL — SIGNIFICANT CHANGE UP (ref 2.5–4.5)
PLATELET # BLD AUTO: 363 K/UL — SIGNIFICANT CHANGE UP (ref 150–400)
POTASSIUM SERPL-MCNC: 4.4 MMOL/L — SIGNIFICANT CHANGE UP (ref 3.5–5.3)
POTASSIUM SERPL-SCNC: 4.4 MMOL/L — SIGNIFICANT CHANGE UP (ref 3.5–5.3)
PROT SERPL-MCNC: 5.4 G/DL — LOW (ref 6–8.3)
RBC # BLD: 2.44 M/UL — LOW (ref 4.2–5.8)
RBC # FLD: 13.4 % — SIGNIFICANT CHANGE UP (ref 10.3–14.5)
SODIUM SERPL-SCNC: 135 MMOL/L — SIGNIFICANT CHANGE UP (ref 135–145)
WBC # BLD: 9.23 K/UL — SIGNIFICANT CHANGE UP (ref 3.8–10.5)
WBC # FLD AUTO: 9.23 K/UL — SIGNIFICANT CHANGE UP (ref 3.8–10.5)

## 2019-12-27 PROCEDURE — 99231 SBSQ HOSP IP/OBS SF/LOW 25: CPT

## 2019-12-27 PROCEDURE — 99232 SBSQ HOSP IP/OBS MODERATE 35: CPT

## 2019-12-27 PROCEDURE — 71045 X-RAY EXAM CHEST 1 VIEW: CPT | Mod: 26

## 2019-12-27 RX ORDER — ELECTROLYTE SOLUTION,INJ
1 VIAL (ML) INTRAVENOUS
Refills: 0 | Status: DISCONTINUED | OUTPATIENT
Start: 2019-12-27 | End: 2019-12-27

## 2019-12-27 RX ORDER — I.V. FAT EMULSION 20 G/100ML
20.8 EMULSION INTRAVENOUS
Qty: 50 | Refills: 0 | Status: DISCONTINUED | OUTPATIENT
Start: 2019-12-27 | End: 2019-12-28

## 2019-12-27 RX ORDER — MAGNESIUM SULFATE 500 MG/ML
2 VIAL (ML) INJECTION ONCE
Refills: 0 | Status: COMPLETED | OUTPATIENT
Start: 2019-12-27 | End: 2019-12-27

## 2019-12-27 RX ADMIN — Medication 400 MILLIGRAM(S): at 22:02

## 2019-12-27 RX ADMIN — CHLORHEXIDINE GLUCONATE 1 APPLICATION(S): 213 SOLUTION TOPICAL at 05:25

## 2019-12-27 RX ADMIN — MORPHINE 6 MILLIGRAM(S): 10 SOLUTION ORAL at 17:23

## 2019-12-27 RX ADMIN — MORPHINE 6 MILLIGRAM(S): 10 SOLUTION ORAL at 22:01

## 2019-12-27 RX ADMIN — Medication 12.5 MILLIGRAM(S): at 17:24

## 2019-12-27 RX ADMIN — Medication 3 MILLILITER(S): at 05:06

## 2019-12-27 RX ADMIN — Medication 0.5 MILLIGRAM(S): at 17:44

## 2019-12-27 RX ADMIN — OCTREOTIDE ACETATE 100 MICROGRAM(S): 200 INJECTION, SOLUTION INTRAVENOUS; SUBCUTANEOUS at 15:18

## 2019-12-27 RX ADMIN — SODIUM CHLORIDE 10 MILLILITER(S): 9 INJECTION INTRAMUSCULAR; INTRAVENOUS; SUBCUTANEOUS at 05:25

## 2019-12-27 RX ADMIN — PANTOPRAZOLE SODIUM 40 MILLIGRAM(S): 20 TABLET, DELAYED RELEASE ORAL at 12:58

## 2019-12-27 RX ADMIN — Medication 12.5 MILLIGRAM(S): at 23:09

## 2019-12-27 RX ADMIN — Medication 16 MILLIGRAM(S): at 22:02

## 2019-12-27 RX ADMIN — Medication 50 GRAM(S): at 03:38

## 2019-12-27 RX ADMIN — Medication 12.5 MILLIGRAM(S): at 12:58

## 2019-12-27 RX ADMIN — Medication 1 EACH: at 22:03

## 2019-12-27 RX ADMIN — Medication 2 TABLET(S): at 12:59

## 2019-12-27 RX ADMIN — Medication 0.5 MILLIGRAM(S): at 05:06

## 2019-12-27 RX ADMIN — Medication 3 MILLILITER(S): at 00:29

## 2019-12-27 RX ADMIN — MORPHINE 6 MILLIGRAM(S): 10 SOLUTION ORAL at 13:00

## 2019-12-27 RX ADMIN — Medication 3 MILLILITER(S): at 17:44

## 2019-12-27 RX ADMIN — MORPHINE 6 MILLIGRAM(S): 10 SOLUTION ORAL at 08:33

## 2019-12-27 RX ADMIN — OCTREOTIDE ACETATE 100 MICROGRAM(S): 200 INJECTION, SOLUTION INTRAVENOUS; SUBCUTANEOUS at 05:24

## 2019-12-27 RX ADMIN — ENOXAPARIN SODIUM 40 MILLIGRAM(S): 100 INJECTION SUBCUTANEOUS at 12:58

## 2019-12-27 RX ADMIN — Medication 16 MILLIGRAM(S): at 08:34

## 2019-12-27 RX ADMIN — Medication 2 TABLET(S): at 08:34

## 2019-12-27 RX ADMIN — OCTREOTIDE ACETATE 100 MICROGRAM(S): 200 INJECTION, SOLUTION INTRAVENOUS; SUBCUTANEOUS at 22:02

## 2019-12-27 RX ADMIN — Medication 12.5 MILLIGRAM(S): at 05:25

## 2019-12-27 RX ADMIN — Medication 400 MILLIGRAM(S): at 05:25

## 2019-12-27 RX ADMIN — Medication 400 MILLIGRAM(S): at 15:17

## 2019-12-27 RX ADMIN — Medication 2 TABLET(S): at 17:24

## 2019-12-27 RX ADMIN — Medication 3 MILLILITER(S): at 11:18

## 2019-12-27 RX ADMIN — Medication 16 MILLIGRAM(S): at 17:24

## 2019-12-27 RX ADMIN — Medication 2 TABLET(S): at 22:02

## 2019-12-27 RX ADMIN — Medication 16 MILLIGRAM(S): at 12:59

## 2019-12-27 NOTE — PROGRESS NOTE ADULT - PROBLEM SELECTOR PLAN 1
moderate fluid to left lung and recommend   IR to tap for drainage.  call made to IR this am  Will continue to follow  Care as per primary team 12/24 left chest tube for ptx dc'd  Left loculated pleural effusion and recommend IR to place pigtail for drainage.    Will continue to follow  Care as per primary team 12/24 left chest tube for ptx dc'd  Left loculated pleural effusion and recommending IR to place pigtail for drainage.    Will continue to follow  Care as per primary team

## 2019-12-27 NOTE — PROGRESS NOTE ADULT - SUBJECTIVE AND OBJECTIVE BOX
HISTORY  74y Male presented 12/6 with abdominal pain, nausea, hematemesis, and poor PO intake for ~2-3 days. Labs significant for an CHI w/ Cr 2.74 and lactate of 9.4. He was also notably tachycardic to the 110s and hypotensive w/ SBP in the 70s. He was given 2 units of PRBCs and 2 L of LR in the ED due to concern for upper GI bleeding. Imaging revealed high grade SBO in the RLQ. Patient was taken to the OR emergently for an exploratory laparotomy, lysis of adhesions, decompression of bowel via enterotomy w/ primary repair, and Abthera VAC placement. Of note, the distal 50% of the bowel appeared dusky but viable. He required vasopressor support with phenylephrine and vasopressin infusions. He received 3000 mL of crystalloid w/ EBL of 10 mL and UOP of 25 mL. Patient was left intubated at the end of the case so SICU was consulted for hemodynamic monitoring. Taken back to the OR on 12/8 and underwent take down of Abthera, washout and re-application of the Abthera vac. Went back to OR on 12/10 night for colectomy. Post-op course complicated by short gut syndrome      24 HOUR EVENTS:  - changed ostomy output repletion to 0.5:1 with NS Q6h  - pending thoracic/IR decision    SUBJECTIVE/ROS:  [x] A ten-point review of systems was otherwise negative except as noted.  [ ] Due to altered mental status/intubation, subjective information were not able to be obtained from the patient. History was obtained, to the extent possible, from review of the chart and collateral sources of information.      NEURO    Exam: awake, alert, oriented x4, no acute distress, no focal deficits  Meds: acetaminophen  IVPB .. 750 milliGRAM(s) IV Intermittent once PRN Mild Pain (1 - 3)  ondansetron Injectable 4 milliGRAM(s) IV Push every 6 hours PRN Nausea and/or Vomiting  opium Tincture 6 milliGRAM(s) Oral <User Schedule>    [x] Adequacy of sedation and pain control has been assessed and adjusted      RESPIRATORY  RR: 27 (12-27-19 @ 01:00) (20 - 35)  SpO2: 97% (12-27-19 @ 01:00) (86% - 100%)  Wt(kg): --  Exam: unlabored, clear to auscultation bilaterally  Mechanical Ventilation:     [ ] Extubation Readiness Assessed  Meds: albuterol/ipratropium for Nebulization 3 milliLiter(s) Nebulizer every 6 hours  buDESOnide    Inhalation Suspension 0.5 milliGRAM(s) Inhalation every 12 hours        CARDIOVASCULAR  HR: 91 (12-27-19 @ 01:00) (80 - 104)  BP: 111/59 (12-27-19 @ 01:00) (83/53 - 151/74)  BP(mean): 80 (12-27-19 @ 01:00) (63 - 105)  ABP: --  ABP(mean): --  Wt(kg): --  CVP(cm H2O): --  Exam: regular rate and rhythm  Cardiac Rhythm: sinus  Perfusion     [x]Adequate   [ ]Inadequate  Mentation   [x]Normal       [ ]Reduced  Extremities  [x]Warm         [ ]Cool  Volume Status [ ]Hypervolemic [x]Euvolemic [ ]Hypovolemic  Meds: metoprolol tartrate 12.5 milliGRAM(s) Oral every 6 hours        GI/NUTRITION  Exam: soft, nontender, nondistended, incision C/D/I  Diet: dysphagia 1  Meds: diphenoxylate/atropine 2 Tablet(s) Oral <User Schedule>  loperamide 16 milliGRAM(s) Oral <User Schedule>  pantoprazole  Injectable 40 milliGRAM(s) IV Push daily      GENITOURINARY  I&O's Detail    12-25 @ 07:01  -  12-26 @ 07:00  --------------------------------------------------------  IN:    fat emulsion (Fish Oil and Plant Based) 20% Infusion: 270.4 mL    sodium chloride 0.9%: 230 mL    Sodium Chloride 0.9% IV Bolus: 500 mL    Solution: 1950 mL    Solution: 50 mL    TPN (Total Parenteral Nutrition): 2400 mL  Total IN: 5400.4 mL    OUT:    Ileostomy: 1950 mL    Incontinent per Condom Catheter: 1901 mL    Voided: 1250 mL  Total OUT: 5101 mL    Total NET: 299.4 mL      12-26 @ 07:01  -  12-27 @ 02:00  --------------------------------------------------------  IN:    fat emulsion (Fish Oil and Plant Based) 20% Infusion: 145.6 mL    Oral Fluid: 250 mL    sodium chloride 0.9%: 40 mL    sodium chloride 0.9%.: 130 mL    Solution: 1300 mL    TPN (Total Parenteral Nutrition): 1700 mL  Total IN: 3565.6 mL    OUT:    Ileostomy: 1800 mL    Incontinent per Condom Catheter: 1850 mL  Total OUT: 3650 mL    Total NET: -84.4 mL          12-27    135  |  103  |  15  ----------------------------<  152<H>  4.4   |  24  |  0.48<L>    Ca    7.5<L>      27 Dec 2019 00:29  Phos  3.0     12-27  Mg     1.7     12-27    TPro  5.4<L>  /  Alb  2.1<L>  /  TBili  0.3  /  DBili  0.2  /  AST  13  /  ALT  25  /  AlkPhos  106  12-27    [ ] Martinez catheter, indication: N/A  Meds: fat emulsion (Fish Oil and Plant Based) 20% Infusion 20.8 mL/Hr IV Continuous <Continuous>  Parenteral Nutrition - Adult 1 Each TPN Continuous <Continuous>  sodium chloride 0.9%. 1000 milliLiter(s) IV Continuous <Continuous>        HEMATOLOGIC  Meds: enoxaparin Injectable 40 milliGRAM(s) SubCutaneous daily    [x] VTE Prophylaxis                        7.3    9.23  )-----------( 363      ( 27 Dec 2019 00:29 )             23.1       Transfusion     [ ] PRBC   [ ] Platelets   [ ] FFP   [ ] Cryoprecipitate      INFECTIOUS DISEASES  T(C): 37.3 (12-26-19 @ 23:00), Max: 37.3 (12-26-19 @ 03:00)  Wt(kg): --  WBC Count: 9.23 K/uL (12-27 @ 00:29)  WBC Count: 9.90 K/uL (12-26 @ 02:23)    Recent Cultures:    Meds: acyclovir   Oral Tab/Cap 400 milliGRAM(s) Oral three times a day        ENDOCRINE  Capillary Blood Glucose    Meds: insulin lispro (HumaLOG) corrective regimen sliding scale   SubCutaneous three times a day before meals  insulin lispro (HumaLOG) corrective regimen sliding scale   SubCutaneous at bedtime  octreotide  Injectable 100 MICROGram(s) SubCutaneous three times a day        ACCESS DEVICES:  [x] Peripheral IV  [ ] Central Venous Line	[ ] R	[ ] L	[ ] IJ	[ ] Fem	[ ] SC	Placed:   [ ] Arterial Line		[ ] R	[ ] L	[ ] Fem	[ ] Rad	[ ] Ax	Placed:   [x] PICC:	RUE placed on 12/14			[ ] Mediport  [ ] Urinary Catheter, Date Placed:   [x] Necessity of urinary, arterial, and venous catheters discussed      OTHER MEDICATIONS:  chlorhexidine 2% Cloths 1 Application(s) Topical <User Schedule>      CODE STATUS:     IMAGING: HISTORY  74y Male presented 12/6 with abdominal pain, nausea, hematemesis, and poor PO intake for ~2-3 days. Labs significant for an CHI w/ Cr 2.74 and lactate of 9.4. He was also notably tachycardic to the 110s and hypotensive w/ SBP in the 70s. He was given 2 units of PRBCs and 2 L of LR in the ED due to concern for upper GI bleeding. Imaging revealed high grade SBO in the RLQ. Patient was taken to the OR emergently for an exploratory laparotomy, lysis of adhesions, decompression of bowel via enterotomy w/ primary repair, and Abthera VAC placement. Of note, the distal 50% of the bowel appeared dusky but viable. He required vasopressor support with phenylephrine and vasopressin infusions. He received 3000 mL of crystalloid w/ EBL of 10 mL and UOP of 25 mL. Patient was left intubated at the end of the case so SICU was consulted for hemodynamic monitoring. Taken back to the OR on 12/8 and underwent take down of Abthera, washout and re-application of the Abthera vac. Went back to OR on 12/10 night for colectomy. Post-op course complicated by short gut syndrome      24 HOUR EVENTS:  - changed ostomy output repletion to 0.5:1 with NS Q6h  - pending thoracic/IR decision    SUBJECTIVE/ROS:  [x] A ten-point review of systems was otherwise negative except as noted.  [ ] Due to altered mental status/intubation, subjective information were not able to be obtained from the patient. History was obtained, to the extent possible, from review of the chart and collateral sources of information.    GENERAL: no distress    NEURO    Exam: awake, alert, oriented x4, no acute distress, no focal deficits  Meds: acetaminophen  IVPB .. 750 milliGRAM(s) IV Intermittent once PRN Mild Pain (1 - 3)  ondansetron Injectable 4 milliGRAM(s) IV Push every 6 hours PRN Nausea and/or Vomiting  opium Tincture 6 milliGRAM(s) Oral <User Schedule>    [x] Adequacy of sedation and pain control has been assessed and adjusted      RESPIRATORY  RR: 27 (12-27-19 @ 01:00) (20 - 35)  SpO2: 97% (12-27-19 @ 01:00) (86% - 100%)  Wt(kg): --  Exam: unlabored, clear to auscultation bilaterally  Mechanical Ventilation:     [ ] Extubation Readiness Assessed  Meds: albuterol/ipratropium for Nebulization 3 milliLiter(s) Nebulizer every 6 hours  buDESOnide    Inhalation Suspension 0.5 milliGRAM(s) Inhalation every 12 hours        CARDIOVASCULAR  HR: 91 (12-27-19 @ 01:00) (80 - 104)  BP: 111/59 (12-27-19 @ 01:00) (83/53 - 151/74)  BP(mean): 80 (12-27-19 @ 01:00) (63 - 105)  ABP: --  ABP(mean): --  Wt(kg): --  CVP(cm H2O): --  Exam: regular rate and rhythm  Cardiac Rhythm: sinus  Perfusion     [x]Adequate   [ ]Inadequate  Mentation   [x]Normal       [ ]Reduced  Extremities  [x]Warm         [ ]Cool  Volume Status [ ]Hypervolemic [x]Euvolemic [ ]Hypovolemic  Meds: metoprolol tartrate 12.5 milliGRAM(s) Oral every 6 hours        GI/NUTRITION  Exam: soft, nontender, nondistended, incision C/D/I  Diet: dysphagia 1  Meds: diphenoxylate/atropine 2 Tablet(s) Oral <User Schedule>  loperamide 16 milliGRAM(s) Oral <User Schedule>  pantoprazole  Injectable 40 milliGRAM(s) IV Push daily      GENITOURINARY  I&O's Detail    12-25 @ 07:01  -  12-26 @ 07:00  --------------------------------------------------------  IN:    fat emulsion (Fish Oil and Plant Based) 20% Infusion: 270.4 mL    sodium chloride 0.9%: 230 mL    Sodium Chloride 0.9% IV Bolus: 500 mL    Solution: 1950 mL    Solution: 50 mL    TPN (Total Parenteral Nutrition): 2400 mL  Total IN: 5400.4 mL    OUT:    Ileostomy: 1950 mL    Incontinent per Condom Catheter: 1901 mL    Voided: 1250 mL  Total OUT: 5101 mL    Total NET: 299.4 mL      12-26 @ 07:01 - 12-27 @ 02:00  --------------------------------------------------------  IN:    fat emulsion (Fish Oil and Plant Based) 20% Infusion: 145.6 mL    Oral Fluid: 250 mL    sodium chloride 0.9%: 40 mL    sodium chloride 0.9%.: 130 mL    Solution: 1300 mL    TPN (Total Parenteral Nutrition): 1700 mL  Total IN: 3565.6 mL    OUT:    Ileostomy: 1800 mL    Incontinent per Condom Catheter: 1850 mL  Total OUT: 3650 mL    Total NET: -84.4 mL          12-27    135  |  103  |  15  ----------------------------<  152<H>  4.4   |  24  |  0.48<L>    Ca    7.5<L>      27 Dec 2019 00:29  Phos  3.0     12-27  Mg     1.7     12-27    TPro  5.4<L>  /  Alb  2.1<L>  /  TBili  0.3  /  DBili  0.2  /  AST  13  /  ALT  25  /  AlkPhos  106  12-27    [ ] Martinez catheter, indication: N/A  Meds: fat emulsion (Fish Oil and Plant Based) 20% Infusion 20.8 mL/Hr IV Continuous <Continuous>  Parenteral Nutrition - Adult 1 Each TPN Continuous <Continuous>  sodium chloride 0.9%. 1000 milliLiter(s) IV Continuous <Continuous>        HEMATOLOGIC  Meds: enoxaparin Injectable 40 milliGRAM(s) SubCutaneous daily    [x] VTE Prophylaxis                        7.3    9.23  )-----------( 363      ( 27 Dec 2019 00:29 )             23.1       Transfusion     [ ] PRBC   [ ] Platelets   [ ] FFP   [ ] Cryoprecipitate      INFECTIOUS DISEASES  T(C): 37.3 (12-26-19 @ 23:00), Max: 37.3 (12-26-19 @ 03:00)  Wt(kg): --  WBC Count: 9.23 K/uL (12-27 @ 00:29)  WBC Count: 9.90 K/uL (12-26 @ 02:23)    Recent Cultures:    Meds: acyclovir   Oral Tab/Cap 400 milliGRAM(s) Oral three times a day        ENDOCRINE  Capillary Blood Glucose    Meds: insulin lispro (HumaLOG) corrective regimen sliding scale   SubCutaneous three times a day before meals  insulin lispro (HumaLOG) corrective regimen sliding scale   SubCutaneous at bedtime  octreotide  Injectable 100 MICROGram(s) SubCutaneous three times a day        ACCESS DEVICES:  [x] Peripheral IV  [ ] Central Venous Line	[ ] R	[ ] L	[ ] IJ	[ ] Fem	[ ] SC	Placed:   [ ] Arterial Line		[ ] R	[ ] L	[ ] Fem	[ ] Rad	[ ] Ax	Placed:   [x] PICC:	RUE placed on 12/14			[ ] Mediport  [ ] Urinary Catheter, Date Placed:   [x] Necessity of urinary, arterial, and venous catheters discussed      OTHER MEDICATIONS:  chlorhexidine 2% Cloths 1 Application(s) Topical <User Schedule>      CODE STATUS:     IMAGING:

## 2019-12-27 NOTE — PROGRESS NOTE ADULT - SUBJECTIVE AND OBJECTIVE BOX
Patient is a 74y old  Male who presents with a chief complaint of abd. pain (27 Dec 2019 02:00)      Vital Signs Last 24 Hrs  T(C): 37.1 (19 @ 07:00), Max: 37.3 (19 @ 23:00)  T(F): 98.8 (19 @ 07:00), Max: 99.1 (19 @ 23:00)  HR: 86 (19 @ 08:00) (80 - 96)  BP: 114/60 (19 @ 08:00) (83/53 - 129/58)  RR: 27 (19 @ 08:00) (19 - 35)  SpO2: 95% (19 @ 08:00) (86% - 100%)              19 @ 07:01  -  19 @ 07:00  --------------------------------------------------------  IN: 5318.8 mL / OUT: 5000 mL / NET: 318.8 mL        Daily Weight in k.3 (27 Dec 2019 00:51)                          7.3    9.23  )-----------( 363      ( 27 Dec 2019 00:29 )             23.1       135  |  103  |  15  ----------------------------<  152<H>  4.4   |  24  |  0.48<L>        PHYSICAL EXAM  Neurology: A&Ox3, NAD  CV : RRR+S1S2  Lungs: Respirations non-labored, B/L BS CTA  Abdomen: Soft, NT/ND, +BSx4Q  Extremities: B/L LE warm, no edema, +PP             MEDICATIONS  acyclovir   Oral Tab/Cap 400 milliGRAM(s) Oral three times a day  albuterol/ipratropium for Nebulization 3 milliLiter(s) Nebulizer every 6 hours  buDESOnide    Inhalation Suspension 0.5 milliGRAM(s) Inhalation every 12 hours  chlorhexidine 2% Cloths 1 Application(s) Topical <User Schedule>  diphenoxylate/atropine 2 Tablet(s) Oral <User Schedule>  enoxaparin Injectable 40 milliGRAM(s) SubCutaneous daily  fat emulsion (Fish Oil and Plant Based) 20% Infusion 20.8 mL/Hr IV Continuous <Continuous>  insulin lispro (HumaLOG) corrective regimen sliding scale   SubCutaneous three times a day before meals  insulin lispro (HumaLOG) corrective regimen sliding scale   SubCutaneous at bedtime  loperamide 16 milliGRAM(s) Oral <User Schedule>  metoprolol tartrate 12.5 milliGRAM(s) Oral every 6 hours  octreotide  Injectable 100 MICROGram(s) SubCutaneous three times a day  ondansetron Injectable 4 milliGRAM(s) IV Push every 6 hours PRN  opium Tincture 6 milliGRAM(s) Oral <User Schedule>  pantoprazole  Injectable 40 milliGRAM(s) IV Push daily  Parenteral Nutrition - Adult 1 Each TPN Continuous <Continuous>  sodium chloride 0.9%. 1000 milliLiter(s) IV Continuous <Continuous> Patient is a 74y old  Male who presents with a chief complaint of abd. pain (27 Dec 2019 02:00)      Vital Signs Last 24 Hrs  T(C): 37.1 (19 @ 07:00), Max: 37.3 (19 @ 23:00)  T(F): 98.8 (19 @ 07:00), Max: 99.1 (19 @ 23:00)  HR: 86 (19 @ 08:00) (80 - 96)  BP: 114/60 (19 @ 08:00) (83/53 - 129/58)  RR: 27 (19 @ 08:00) (19 - 35)  SpO2: 95% (19 @ 08:00) (86% - 100%)              19 @ 07:01  -  19 @ 07:00  --------------------------------------------------------  IN: 5318.8 mL / OUT: 5000 mL / NET: 318.8 mL        Daily Weight in k.3 (27 Dec 2019 00:51)                          7.3    9.23  )-----------( 363      ( 27 Dec 2019 00:29 )             23.1       135  |  103  |  15  ----------------------------<  152<H>  4.4   |  24  |  0.48<L>        PHYSICAL EXAM  Neurology: A&Ox3, NAD  CV : RRR+S1S2  Lungs: Respirations non-labored, B/L BS clear, diminished at bases  Abdomen: Soft, NT/ND, +BSx4Q +midline ABD incision with staples CDI PETROS,                  Ostomy pink with output  Extremities: B/L LE warm, no edema, +PP       +PICC      MEDICATIONS  acyclovir   Oral Tab/Cap 400 milliGRAM(s) Oral three times a day  albuterol/ipratropium for Nebulization 3 milliLiter(s) Nebulizer every 6 hours  buDESOnide    Inhalation Suspension 0.5 milliGRAM(s) Inhalation every 12 hours  chlorhexidine 2% Cloths 1 Application(s) Topical <User Schedule>  diphenoxylate/atropine 2 Tablet(s) Oral <User Schedule>  enoxaparin Injectable 40 milliGRAM(s) SubCutaneous daily  fat emulsion (Fish Oil and Plant Based) 20% Infusion 20.8 mL/Hr IV Continuous <Continuous>  insulin lispro (HumaLOG) corrective regimen sliding scale   SubCutaneous three times a day before meals  insulin lispro (HumaLOG) corrective regimen sliding scale   SubCutaneous at bedtime  loperamide 16 milliGRAM(s) Oral <User Schedule>  metoprolol tartrate 12.5 milliGRAM(s) Oral every 6 hours  octreotide  Injectable 100 MICROGram(s) SubCutaneous three times a day  ondansetron Injectable 4 milliGRAM(s) IV Push every 6 hours PRN  opium Tincture 6 milliGRAM(s) Oral <User Schedule>  pantoprazole  Injectable 40 milliGRAM(s) IV Push daily  Parenteral Nutrition - Adult 1 Each TPN Continuous <Continuous>  sodium chloride 0.9%. 1000 milliLiter(s) IV Continuous <Continuous>

## 2019-12-27 NOTE — PROGRESS NOTE ADULT - SUBJECTIVE AND OBJECTIVE BOX
Subjective: Patient seen and examined. No new events except as noted.   remains in ICU   resting comfortably    REVIEW OF SYSTEMS:    CONSTITUTIONAL:+ weakness, fevers or chills  EYES/ENT: No visual changes;  No vertigo or throat pain   NECK: No pain or stiffness  RESPIRATORY: No cough, wheezing, hemoptysis; No shortness of breath  CARDIOVASCULAR: No chest pain or palpitations  GASTROINTESTINAL: No abdominal or epigastric pain. No nausea, vomiting, or hematemesis; No diarrhea or constipation. No melena or hematochezia.  GENITOURINARY: No dysuria, frequency or hematuria  NEUROLOGICAL: No numbness or weakness  SKIN: No itching, burning, rashes, or lesions   All other review of systems is negative unless indicated above.    MEDICATIONS:  MEDICATIONS  (STANDING):  acyclovir   Oral Tab/Cap 400 milliGRAM(s) Oral three times a day  albuterol/ipratropium for Nebulization 3 milliLiter(s) Nebulizer every 6 hours  buDESOnide    Inhalation Suspension 0.5 milliGRAM(s) Inhalation every 12 hours  chlorhexidine 2% Cloths 1 Application(s) Topical <User Schedule>  diphenoxylate/atropine 2 Tablet(s) Oral <User Schedule>  enoxaparin Injectable 40 milliGRAM(s) SubCutaneous daily  fat emulsion (Fish Oil and Plant Based) 20% Infusion 20.8 mL/Hr (20.8 mL/Hr) IV Continuous <Continuous>  insulin lispro (HumaLOG) corrective regimen sliding scale   SubCutaneous three times a day before meals  insulin lispro (HumaLOG) corrective regimen sliding scale   SubCutaneous at bedtime  loperamide 16 milliGRAM(s) Oral <User Schedule>  metoprolol tartrate 12.5 milliGRAM(s) Oral every 6 hours  octreotide  Injectable 100 MICROGram(s) SubCutaneous three times a day  opium Tincture 6 milliGRAM(s) Oral <User Schedule>  pantoprazole  Injectable 40 milliGRAM(s) IV Push daily  Parenteral Nutrition - Adult 1 Each (100 mL/Hr) TPN Continuous <Continuous>  sodium chloride 0.9%. 1000 milliLiter(s) (10 mL/Hr) IV Continuous <Continuous>      PHYSICAL EXAM:  T(C): 37.1 (12-27-19 @ 07:00), Max: 37.3 (12-26-19 @ 23:00)  HR: 89 (12-27-19 @ 09:00) (80 - 96)  BP: 142/66 (12-27-19 @ 09:00) (83/53 - 142/66)  RR: 27 (12-27-19 @ 09:00) (19 - 34)  SpO2: 95% (12-27-19 @ 09:00) (86% - 100%)  Wt(kg): --  I&O's Summary    26 Dec 2019 07:01  -  27 Dec 2019 07:00  --------------------------------------------------------  IN: 5318.8 mL / OUT: 5000 mL / NET: 318.8 mL            Appearance: NAD   HEENT:   Dry oral mucosa, crusted lips   Lymphatic: No lymphadenopathy   Cardiovascular: Normal S1 S2, No JVD, No murmurs , Peripheral pulses palpable 2+ bilaterally  Respiratory: Decreased bs   Gastrointestinal:  Soft, NT, ND. Ostomy pink with output. Midline staples in place, midline incision site is c/d/i   Skin: No rashes, No ecchymoses, No cyanosis, warm to touch  Musculoskeletal: Decreased  range of motion and strength  Psychiatry:  mildly sedated   Ext: No edema +PICC line   +rosas   +rectal tube     LABS:    CARDIAC MARKERS:                                7.3    9.23  )-----------( 363      ( 27 Dec 2019 00:29 )             23.1     12-27    135  |  103  |  15  ----------------------------<  152<H>  4.4   |  24  |  0.48<L>    Ca    7.5<L>      27 Dec 2019 00:29  Phos  3.0     12-27  Mg     1.7     12-27    TPro  5.4<L>  /  Alb  2.1<L>  /  TBili  0.3  /  DBili  0.2  /  AST  13  /  ALT  25  /  AlkPhos  106  12-27    proBNP:   Lipid Profile:   HgA1c:   TSH:             TELEMETRY: 	 SR   ECG:  	  RADIOLOGY:  < from: Xray Chest 1 View- PORTABLE-Urgent (12.27.19 @ 07:38) >    EXAM:  XR CHEST PORTABLE URGENT 1V                            PROCEDURE DATE:  12/27/2019            INTERPRETATION:  CLINICAL INFORMATION: Left pleural effusion..    A single portable view of the chest was obtained.     Comparison: 12/25/2019.     The mediastinal cardiac silhouette is unremarkable. Right PICC line unchanged.    Small left effusion unchanged. Trace right effusion.    No acute osseous finding.     IMPRESSION:    As above.                    CALLY TEMPLE M.D., ATTENDING RADIOLOGIST  This document has been electronically signed. Dec 27 2019  8:40AM                < end of copied text >    DIAGNOSTIC TESTING:  [ ] Echocardiogram:  [ ]  Catheterization:  [ ] Stress Test:    OTHER:

## 2019-12-27 NOTE — PROGRESS NOTE ADULT - ATTENDING COMMENTS
Pt seen and examined with residents and PA, agree with above.    1. High-output ileostomy due to short gut syndrome:  - Continue lomotil, imodium, and tincture of opium: increased dose of lomotil and imodium (usual dose parameters can be exceeded per expert guidelines in short gut syndrome for lomotil and imodium)  - Continue TPN  - PPI to decrease gastric secretions  - D/c fluid replacements given high urine output  - Octreotide started, may take 5-7 days to see effect    2. L PTX s/p pleural pigtail catheter, air leak resolved, now with L pleural effusion with LLL consolidation on my personal review of CT chest and today's CXR:  - Thoracic to discuss with IR    3. Oral lesions:  - Acyclovir for possible HSV    4. Mild hyponatremia:  - Stable, check BMP daily    5. Dysphagia:   - Follow up FEES

## 2019-12-27 NOTE — PROGRESS NOTE ADULT - ASSESSMENT
Assessment	  74M s/p ex lap, MIRYAM, closure of enterotomy, abthera vac placement 12/8 early morning. RTOR for exlap/washout 12/9. s/p RTOR ex lap, resection of 150cm bowel, creation of colostomy. Course c/b spontaneous PTX s/p pigtail 12/15, pleuravac, pigtail removed 12/24.    Plan   - continue to monitor HR and ileostomy op   - evaluated by speech and swallow who suggested dysphagia 1 necator diet and FEES when not NPO  - f/u CT surgery consult with IR for possible drainage   - c/w imodium, lomotil, and opium tincture, monitor ostomy output   - c/w 0.5:1 ostomy repletions    - TPN  - appreciate excellent SICU care    [facility]  [id number]

## 2019-12-27 NOTE — PROGRESS NOTE ADULT - ASSESSMENT
A/P: 74 year old male w/ PMH of COPD and EtOH dependence with PSH/o appy, prostate surgery, & spine surgery  septic shock and high grade SBO s/p exploratory laparotomy, lysis of adhesions, decompression of bowel via enterotomy w/ primary repair, and Abthera VAC placement on 12/6; s/p take down of Abthera, washout and re-application of the Abthera vac on 12/8. Now s/p SBR and end ileostomy on 12/10.   TPN consulted to assist w/ management of pt's nutrition in pt w/ prolonged hospital course now tolerating diet but has high Ileostomy output     Continue TPN at full strength in pt w/ severe Protein-Calorie Malnutrition- and high ostomy output  Started on Dysphagia 1 Pureed-Nectar Consistency Fluid     TPN GOAL:  120 grams amino acids (800ml 15% a.a.)  210 grams dextrose (300ml 70% dex)  50 grams SMOFlipid (250ml 20%IVFE @ 20.8ml/hr x 12 hours)  in 2400mL volume  Micronutrients: 10ml MVI, 3ml MTE-5    HypoNa & ELevated Bicarb- improving & will continue to monitor-         NaCl increased to 80mEq  Elevated K - improving w/ decreased KCl at 60 mEq KCl in TPN          possibly due to Octreotide  HypoPhos resolving, now with rising Phos-  improving - with decrease of NaPHos to 45mMol   Strict Intake and Output- high ileostomy output -continue with       SICU to replete prn - pt started on Octreotide - will take 5-7d to see full benefit       pt now on increased lomotil, imodium, tincture of opium, & Octreotide   Consider sending ostomy output for electrolyte evaluation or fecal fat testing to see what pt is absorbing  Hyperglycemia - improved - Insulin removed from TPN      Fingersticks & ISS coverage - to be ordered by primary team  For IR drainage of Loculated Lt chest pleural effusion  PICC w/dedicated port for only TPN - maintenance as per protocol  Weights three times a week  Monitor BMP, Mg, Ionized Ca, Phosphorus daily  Continue to monitor Triglycerides weekly and Pre-albumin weekly  Continue as per SICU / Surgery, will follow with you, D/w primary team    Andreina Hubbard PA-C  TPN team, pager [phone]  D/w Ana M Siegel

## 2019-12-27 NOTE — PROGRESS NOTE ADULT - ASSESSMENT
75 y/o M presenting with septic shock and high grade SBO s/p exploratory laparotomy, lysis of adhesions, decompression of bowel via enterotomy w/ primary repair, and Abthera VAC placement on 12/6; s/p take down of Abthera, washout and re-application of the Abthera vac on 12/8. Now s/p SBR and end ileostomy on 12/10 acute respiratory distress now improving, Pneumothorax, hyperglycemia, delirium. Now still with persistent pneumothorax when chest pigtail clamped.   12/23 VSS on room air recommending IR drainage of left chest  12/24 No evidence of air leak to left chest tube. Tube clamped. Repeat chest cxr in 4 hours. Anticipate chest tube removal if lung remains expanded. Drainage of loculated left effusion discussed with IR. IR to reconsult for drainage once initial tube removed. Discussed plan with Dr Mayes and IR Fellow  12/24  On NC ,    VSSchest xray sm lt pl eff  12/26    2l NC   co  sob,  lt side diminshed  12/27 VSS, IR consulted for left pleural effusion. 73 y/o M presenting with septic shock and high grade SBO s/p exploratory laparotomy, lysis of adhesions, decompression of bowel via enterotomy w/ primary repair, and Abthera VAC placement on 12/6; s/p take down of Abthera, washout and re-application of the Abthera vac on 12/8. Now s/p SBR and end ileostomy on 12/10 acute respiratory distress now improving, Pneumothorax, hyperglycemia, delirium. Now still with persistent pneumothorax when chest pigtail clamped.   12/23 VSS on room air recommending IR drainage of left chest  12/24 No evidence of air leak to left chest tube. Tube clamped. Repeat chest cxr in 4 hours. Anticipate chest tube removal if lung remains expanded. Drainage of loculated left effusion discussed with IR. IR to reconsult for drainage once initial tube removed. Discussed plan with Dr Mayes and IR Fellow  12/24  On NC ,    VSSchest xray sm lt pl eff, left chest tube dc'd.  12/26    2l NC   co  sob,  lt side diminshed  12/27 VSS, CXR with unchanged loculated left pleural effusion- IR consulted for pigtail placement, states effusion can be drained via thoracentesis, pigtail placement not indicated at this time. 75 y/o M presenting with septic shock and high grade SBO s/p exploratory laparotomy, lysis of adhesions, decompression of bowel via enterotomy w/ primary repair, and Abthera VAC placement on 12/6; s/p take down of Abthera, washout and re-application of the Abthera vac on 12/8. Now s/p SBR and end ileostomy on 12/10 acute respiratory distress now improving, Pneumothorax, hyperglycemia, delirium. Now still with persistent pneumothorax when chest pigtail clamped.   12/23 VSS on room air recommending IR drainage of left chest  12/24 No evidence of air leak to left chest tube. Tube clamped. Repeat chest cxr in 4 hours. Anticipate chest tube removal if lung remains expanded. Drainage of loculated left effusion discussed with IR. IR to reconsult for drainage once initial tube removed. Discussed plan with Dr Mayes and IR Fellow  12/24  On NC ,    VSSchest xray sm lt pl eff, left chest tube dc'd.  12/26    2l NC   co  sob,  lt side diminshed  12/27 VSS, CXR with unchanged loculated left pleural effusion- IR consulted for pigtail placement, states effusion can be drained via thoracentesis, pigtail placement not indicated at this time - Dr. Mayes to speak with IR attending.

## 2019-12-27 NOTE — PROGRESS NOTE ADULT - ASSESSMENT
75 y/o M presenting with septic shock and high grade SBO s/p exploratory laparotomy, lysis of adhesions, decompression of bowel via enterotomy w/ primary repair, and Abthera VAC placement on 12/6; s/p take down of Abthera, washout and re-application of the Abthera vac on 12/8. Now s/p SBR and end ileostomy on 12/10 acute respiratory distress now improving, Pneumothorax, hyperglycemia, delirium.    PLAN:    Neuro: A&Ox3   - Tylenol if needed for pain    Resp: acute respiratory failure upon initial presentation, COPD at baseline, spontaneous left sided pneumothorax s/p pig-tail catheter placement.   - Continue to monitor on RA  - Continue Duonebs, budesonide  - Consult IR for L chest tap as per thoracic - pending decision making  - F/u AM CXR for evaluation of PTX    CV: septic shock requiring vasopressor support, now hemodynamically stable   - Metoprolol 12.5 q6 hrs    GI: SBO s/p exploratory laparotomy, lysis of adhesions, decompression of bowel via enterotomy w/ primary repair, and Abthera VAC placement and removal with 150 cm of small bowel removed and end ileostomy.   - Regular diet with TPN (calorie count)  - Monitor ostomy output, LR 0.5 : 1 repletions, lomotil 2 tabs q6hrs & Imodium 16 mg q6hrs & tincture of opium 6 mg q6hrs    Renal: CHI, resolved   - Replete electrolytes as needed  - Monitor I/Os    Heme: no acute issues  - Lovenox for VTE prophylaxis  - SCDs     ID: No active issues   - Trend WBC, monitor fever curve    Endo: no acute issues  - SSI, q6 fingersticks    Dispo: SICU full code

## 2019-12-27 NOTE — SWALLOW FEES ASSESSMENT ADULT - DIAGNOSTIC IMPRESSIONS
Order received for FEES. Initially discontinued, now requested by team. Per d/w MD Myers, patient is pending possible IR procedure today. Will plan for FEES to be completed 12/30 in conjunction with Pulmonary or ENT.

## 2019-12-27 NOTE — PROGRESS NOTE ADULT - SUBJECTIVE AND OBJECTIVE BOX
Surgery Progress Note  Patient is a 74y old  Male who presents with a chief complaint of abd. pain (27 Dec 2019 10:25)    SUBJECTIVE: Patient seen and examined at bedside with surgical team  Patient no longer on NC. But state he is having trouble breathing. \    Vital Signs Last 24 Hrs  T(C): 37.1 (27 Dec 2019 07:00), Max: 37.3 (26 Dec 2019 23:00)  T(F): 98.8 (27 Dec 2019 07:00), Max: 99.1 (26 Dec 2019 23:00)  HR: 90 (27 Dec 2019 11:22) (80 - 96)  BP: 96/51 (27 Dec 2019 10:00) (89/51 - 142/66)  BP(mean): 68 (27 Dec 2019 10:00) (63 - 95)  RR: 21 (27 Dec 2019 10:00) (19 - 34)  SpO2: 100% (27 Dec 2019 11:22) (86% - 100%)    24 HOUR EVENTS:  - changed ostomy output repletion to 0.5:1 with NS Q6h  - pending thoracic/IR decision    Physical Exam  Constitutional: NAD  Respiratory: breathing comfortably on RA  Abd: soft, non-tender, ND, well approximated midline abdominal incision with staples in place  skin: stable midline erythema around staple sites   Ext: moving all 4 ext spontaneously     I&O's Detail    26 Dec 2019 07:01  -  27 Dec 2019 07:00  --------------------------------------------------------  IN:    fat emulsion (Fish Oil and Plant Based) 20% Infusion: 228.8 mL    Oral Fluid: 750 mL    sodium chloride 0.9%: 40 mL    sodium chloride 0.9%.: 200 mL    Solution: 50 mL    Solution: 1650 mL    TPN (Total Parenteral Nutrition): 2400 mL  Total IN: 5318.8 mL    OUT:    Ileostomy: 2500 mL    Incontinent per Condom Catheter: 2500 mL  Total OUT: 5000 mL    Total NET: 318.8 mL      27 Dec 2019 07:01  -  27 Dec 2019 11:39  --------------------------------------------------------  IN:    TPN (Total Parenteral Nutrition): 400 mL  Total IN: 400 mL    OUT:  Total OUT: 0 mL    Total NET: 400 mL      MEDICATIONS  (STANDING):  acyclovir   Oral Tab/Cap 400 milliGRAM(s) Oral three times a day  albuterol/ipratropium for Nebulization 3 milliLiter(s) Nebulizer every 6 hours  buDESOnide    Inhalation Suspension 0.5 milliGRAM(s) Inhalation every 12 hours  chlorhexidine 2% Cloths 1 Application(s) Topical <User Schedule>  diphenoxylate/atropine 2 Tablet(s) Oral <User Schedule>  enoxaparin Injectable 40 milliGRAM(s) SubCutaneous daily  fat emulsion (Fish Oil and Plant Based) 20% Infusion 20.8 mL/Hr (20.8 mL/Hr) IV Continuous <Continuous>  insulin lispro (HumaLOG) corrective regimen sliding scale   SubCutaneous three times a day before meals  insulin lispro (HumaLOG) corrective regimen sliding scale   SubCutaneous at bedtime  loperamide 16 milliGRAM(s) Oral <User Schedule>  metoprolol tartrate 12.5 milliGRAM(s) Oral every 6 hours  octreotide  Injectable 100 MICROGram(s) SubCutaneous three times a day  opium Tincture 6 milliGRAM(s) Oral <User Schedule>  pantoprazole  Injectable 40 milliGRAM(s) IV Push daily  Parenteral Nutrition - Adult 1 Each (100 mL/Hr) TPN Continuous <Continuous>  Parenteral Nutrition - Adult 1 Each (100 mL/Hr) TPN Continuous <Continuous>  sodium chloride 0.9%. 1000 milliLiter(s) (10 mL/Hr) IV Continuous <Continuous>    MEDICATIONS  (PRN):  ondansetron Injectable 4 milliGRAM(s) IV Push every 6 hours PRN Nausea and/or Vomiting      LABS:                        7.3    9.23  )-----------( 363      ( 27 Dec 2019 00:29 )             23.1     12-27    135  |  103  |  15  ----------------------------<  152<H>  4.4   |  24  |  0.48<L>    Ca    7.5<L>      27 Dec 2019 00:29  Phos  3.0     12-27  Mg     1.7     12-27    TPro  5.4<L>  /  Alb  2.1<L>  /  TBili  0.3  /  DBili  0.2  /  AST  13  /  ALT  25  /  AlkPhos  106  12-27      LIVER FUNCTIONS - ( 27 Dec 2019 00:29 )  Alb: 2.1 g/dL / Pro: 5.4 g/dL / ALK PHOS: 106 U/L / ALT: 25 U/L / AST: 13 U/L / GGT: x

## 2019-12-27 NOTE — PROGRESS NOTE ADULT - SUBJECTIVE AND OBJECTIVE BOX
[facility] NUTRITION SUPPORT / TPN -- FOLLOW UP NOTE  --------------------------------------------------------------------------------    24 hour events/subjective:  74y Male with a past medical history of COPD and EtOH dependence who presented 12/6 with abdominal pain, nausea, hematemesis, and poor PO intake for ~2-3 days. Labs significant for an CHI w/ Cr 2.74 and lactate of 9.4. He was also notably tachycardic to the 110s and hypotensive w/ SBP in the 70s. He was given 2 units of PRBCs and 2 L of LR in the ED due to concern for upper GI bleeding. Imaging revealed high grade SBO in the RLQ. Patient was taken to the OR emergently for an exploratory laparotomy, lysis of adhesions, decompression of bowel via enterotomy w/ primary repair, and Abthera VAC placement. Of note, the distal 50% of the bowel appeared dusky but viable. He required vasopressor support with phenylephrine and vasopressin infusions. He received 3000 mL of crystalloid w/ EBL of 10 mL and UOP of 25 mL. Patient was left intubated at the end of the case so SICU was consulted for hemodynamic monitoring. Taken back to the OR on 12/8 and underwent take down of Abthera, washout and re-application of the Abthera vac. Went back to OR on 12/10 night for colectomy with end ileostomy/ mucous fistula.    Pt w/ Lt PTX - w/ resolution of air leak         CTS to follow up w/ IR re drainage of loculated left effusion  Pt w/o pain  Blisters on lips less bothersome- peeling  no n/v   Trial of dysphagia diet - see if helps with w/ High ostomy output -        Increased doses of lomotil, Imodium, & tincture of opium        added Octreotide but need 5-7d to see improvement       PPI to help decrease gastric secretions   Continue to monitor for aspiration- f/u FEES  No f/c/s  no cp/palp/ sob/dyspnea/ cough/ abdominal pain      Diet:  Diet, Dysphagia 1 Pureed-Nectar Consistency Fluid (12-25-19 @ 15:39)      Appetite: [  ]Poor [x  ]Adequate [  ]Good  Caloric intake:  [  x ]  Adequate   [   ] Inadequate    ROS: General/ GI see HPI  all other systems negative      ALLERGIES & MEDICATIONS  --------------------------------------------------------------------------------  No Known Allergies      STANDING INPATIENT MEDICATIONS    acyclovir   Oral Tab/Cap 400 milliGRAM(s) Oral three times a day  albuterol/ipratropium for Nebulization 3 milliLiter(s) Nebulizer every 6 hours  buDESOnide    Inhalation Suspension 0.5 milliGRAM(s) Inhalation every 12 hours  chlorhexidine 2% Cloths 1 Application(s) Topical <User Schedule>  diphenoxylate/atropine 2 Tablet(s) Oral <User Schedule>  enoxaparin Injectable 40 milliGRAM(s) SubCutaneous daily  fat emulsion (Fish Oil and Plant Based) 20% Infusion 20.8 mL/Hr IV Continuous <Continuous>  insulin lispro (HumaLOG) corrective regimen sliding scale   SubCutaneous three times a day before meals  insulin lispro (HumaLOG) corrective regimen sliding scale   SubCutaneous at bedtime  loperamide 16 milliGRAM(s) Oral <User Schedule>  metoprolol tartrate 12.5 milliGRAM(s) Oral every 6 hours  octreotide  Injectable 100 MICROGram(s) SubCutaneous three times a day  opium Tincture 6 milliGRAM(s) Oral <User Schedule>  pantoprazole  Injectable 40 milliGRAM(s) IV Push daily  Parenteral Nutrition - Adult 1 Each TPN Continuous <Continuous>  Parenteral Nutrition - Adult 1 Each TPN Continuous <Continuous>      PRN INPATIENT MEDICATION  ondansetron Injectable 4 milliGRAM(s) IV Push every 6 hours PRN        VITALS/PHYSICAL EXAM  --------------------------------------------------------------------------------  T(C): 37 (12-27-19 @ 11:00), Max: 37.3 (12-26-19 @ 23:00)  HR: 95 (12-27-19 @ 12:00) (80 - 96)  BP: 101/55 (12-27-19 @ 12:00) (90/50 - 142/66)  RR: 25 (12-27-19 @ 12:00) (19 - 34)  SpO2: 94% (12-27-19 @ 12:00) (93% - 100%)  Wt(kg): --        12-26-19 @ 07:01  -  12-27-19 @ 07:00  --------------------------------------------------------  IN: 5318.8 mL / OUT: 5000 mL / NET: 318.8 mL    12-27-19 @ 07:01  -  12-27-19 @ 15:17  --------------------------------------------------------  IN: 400 mL / OUT: 0 mL / NET: 400 mL    PHYSICAL EXAM  --------------------------------------------------------------------------------  	Gen: guarded but stable, A&Ox3  	HEENT: NC/AT, PERRL, supple neck, clear oropharynx, dried ruptured blisters  	GI: (+) BS, softly distended, non tender,                    midline incision w/staples c/d/i w/o s/sx infection                   (+)ostomy pink viable- thick bilious liquid stool like material              MSK: FROM, no contractures nor deformities  	Vascular: Equally Warm, no clubbing, cyanosis, nor edema                        Rt PICC dressing c/d/I   	Neuro: No focal deficits, intact sensation, weakened strength  	Psych: Normal affect and mood  	Skin: Warm, without rashes, good turgor        LABS/ CULTURES/ RADIOLOGY:              7.3    9.23  >-----------<  363      [12-27-19 @ 00:29]              23.1     135  |  103  |  15  ----------------------------<  152      [12-27-19 @ 00:29]  4.4   |  24  |  0.48        Ca     7.5     [12-27-19 @ 00:29]      Mg     1.7     [12-27-19 @ 00:29]      Phos  3.0     [12-27-19 @ 00:29]    TPro  5.4  /  Alb  2.1  /  TBili  0.3  /  DBili  0.2  /  AST  13  /  ALT  25  /  AlkPhos  106  [12-27-19 @ 00:29]      CAPILLARY BLOOD GLUCOSE  POCT Blood Glucose.: 131 mg/dL (27 Dec 2019 11:54)  POCT Blood Glucose.: 145 mg/dL (27 Dec 2019 08:30)  POCT Blood Glucose.: 149 mg/dL (26 Dec 2019 22:06)  POCT Blood Glucose.: 132 mg/dL (26 Dec 2019 17:00)    Prealbumin, Serum: 13 mg/dL (12-25-19 @ 04:24)  Prealbumin, Serum: 14 mg/dL (12-24-19 @ 02:35)  Prealbumin, Serum: 14 mg/dL (12-23-19 @ 06:28)  Prealbumin, Serum: 15 mg/dL (12-20-19 @ 02:49)  Prealbumin, Serum: 5 mg/dL (12-13-19 @ 07:46)    Triglycerides, Serum: 75 mg/dL (12.25.19 @ 02:29)  Triglycerides, Serum: 69 mg/dL (12.24.19 @ 01:10)  Triglycerides, Serum: 73 mg/dL (12.23.19 @ 00:50)

## 2019-12-28 LAB
ALBUMIN SERPL ELPH-MCNC: 2.2 G/DL — LOW (ref 3.3–5)
ALP SERPL-CCNC: 116 U/L — SIGNIFICANT CHANGE UP (ref 40–120)
ALT FLD-CCNC: 26 U/L — SIGNIFICANT CHANGE UP (ref 10–45)
ANION GAP SERPL CALC-SCNC: 8 MMOL/L — SIGNIFICANT CHANGE UP (ref 5–17)
AST SERPL-CCNC: 18 U/L — SIGNIFICANT CHANGE UP (ref 10–40)
BILIRUB DIRECT SERPL-MCNC: 0.2 MG/DL — SIGNIFICANT CHANGE UP (ref 0–0.2)
BILIRUB INDIRECT FLD-MCNC: 0.1 MG/DL — LOW (ref 0.2–1)
BILIRUB SERPL-MCNC: 0.3 MG/DL — SIGNIFICANT CHANGE UP (ref 0.2–1.2)
BUN SERPL-MCNC: 16 MG/DL — SIGNIFICANT CHANGE UP (ref 7–23)
CALCIUM SERPL-MCNC: 8 MG/DL — LOW (ref 8.4–10.5)
CHLORIDE SERPL-SCNC: 102 MMOL/L — SIGNIFICANT CHANGE UP (ref 96–108)
CO2 SERPL-SCNC: 27 MMOL/L — SIGNIFICANT CHANGE UP (ref 22–31)
CREAT SERPL-MCNC: 0.49 MG/DL — LOW (ref 0.5–1.3)
GLUCOSE BLDC GLUCOMTR-MCNC: 113 MG/DL — HIGH (ref 70–99)
GLUCOSE BLDC GLUCOMTR-MCNC: 113 MG/DL — HIGH (ref 70–99)
GLUCOSE BLDC GLUCOMTR-MCNC: 118 MG/DL — HIGH (ref 70–99)
GLUCOSE BLDC GLUCOMTR-MCNC: 85 MG/DL — SIGNIFICANT CHANGE UP (ref 70–99)
GLUCOSE SERPL-MCNC: 135 MG/DL — HIGH (ref 70–99)
HCT VFR BLD CALC: 25.6 % — LOW (ref 39–50)
HGB BLD-MCNC: 7.9 G/DL — LOW (ref 13–17)
MAGNESIUM SERPL-MCNC: 1.7 MG/DL — SIGNIFICANT CHANGE UP (ref 1.6–2.6)
MCHC RBC-ENTMCNC: 29.3 PG — SIGNIFICANT CHANGE UP (ref 27–34)
MCHC RBC-ENTMCNC: 30.9 GM/DL — LOW (ref 32–36)
MCV RBC AUTO: 94.8 FL — SIGNIFICANT CHANGE UP (ref 80–100)
NRBC # BLD: 0 /100 WBCS — SIGNIFICANT CHANGE UP (ref 0–0)
OSMOLALITY UR: 396 MOS/KG — SIGNIFICANT CHANGE UP (ref 300–900)
PHOSPHATE SERPL-MCNC: 3.3 MG/DL — SIGNIFICANT CHANGE UP (ref 2.5–4.5)
PLATELET # BLD AUTO: 374 K/UL — SIGNIFICANT CHANGE UP (ref 150–400)
POTASSIUM SERPL-MCNC: 4.4 MMOL/L — SIGNIFICANT CHANGE UP (ref 3.5–5.3)
POTASSIUM SERPL-SCNC: 4.4 MMOL/L — SIGNIFICANT CHANGE UP (ref 3.5–5.3)
PROT SERPL-MCNC: 5.9 G/DL — LOW (ref 6–8.3)
RBC # BLD: 2.7 M/UL — LOW (ref 4.2–5.8)
RBC # FLD: 13.6 % — SIGNIFICANT CHANGE UP (ref 10.3–14.5)
SODIUM SERPL-SCNC: 137 MMOL/L — SIGNIFICANT CHANGE UP (ref 135–145)
SODIUM UR-SCNC: 82 MMOL/L — SIGNIFICANT CHANGE UP
WBC # BLD: 11.85 K/UL — HIGH (ref 3.8–10.5)
WBC # FLD AUTO: 11.85 K/UL — HIGH (ref 3.8–10.5)

## 2019-12-28 PROCEDURE — 99232 SBSQ HOSP IP/OBS MODERATE 35: CPT

## 2019-12-28 PROCEDURE — 71045 X-RAY EXAM CHEST 1 VIEW: CPT | Mod: 26

## 2019-12-28 PROCEDURE — 99231 SBSQ HOSP IP/OBS SF/LOW 25: CPT

## 2019-12-28 RX ORDER — CEFAZOLIN SODIUM 1 G
1000 VIAL (EA) INJECTION EVERY 8 HOURS
Refills: 0 | Status: COMPLETED | OUTPATIENT
Start: 2019-12-28 | End: 2020-01-02

## 2019-12-28 RX ORDER — I.V. FAT EMULSION 20 G/100ML
20.8 EMULSION INTRAVENOUS
Qty: 50 | Refills: 0 | Status: DISCONTINUED | OUTPATIENT
Start: 2019-12-28 | End: 2019-12-29

## 2019-12-28 RX ORDER — ELECTROLYTE SOLUTION,INJ
1 VIAL (ML) INTRAVENOUS
Refills: 0 | Status: DISCONTINUED | OUTPATIENT
Start: 2019-12-28 | End: 2019-12-28

## 2019-12-28 RX ORDER — CEFAZOLIN SODIUM 1 G
VIAL (EA) INJECTION
Refills: 0 | Status: COMPLETED | OUTPATIENT
Start: 2019-12-28 | End: 2020-01-02

## 2019-12-28 RX ORDER — CEFAZOLIN SODIUM 1 G
1000 VIAL (EA) INJECTION ONCE
Refills: 0 | Status: COMPLETED | OUTPATIENT
Start: 2019-12-28 | End: 2019-12-28

## 2019-12-28 RX ORDER — MAGNESIUM SULFATE 500 MG/ML
2 VIAL (ML) INJECTION ONCE
Refills: 0 | Status: COMPLETED | OUTPATIENT
Start: 2019-12-28 | End: 2019-12-28

## 2019-12-28 RX ORDER — SODIUM CHLORIDE 9 MG/ML
500 INJECTION, SOLUTION INTRAVENOUS ONCE
Refills: 0 | Status: COMPLETED | OUTPATIENT
Start: 2019-12-28 | End: 2019-12-28

## 2019-12-28 RX ADMIN — Medication 16 MILLIGRAM(S): at 17:35

## 2019-12-28 RX ADMIN — OCTREOTIDE ACETATE 100 MICROGRAM(S): 200 INJECTION, SOLUTION INTRAVENOUS; SUBCUTANEOUS at 23:12

## 2019-12-28 RX ADMIN — Medication 3 MILLILITER(S): at 17:41

## 2019-12-28 RX ADMIN — Medication 400 MILLIGRAM(S): at 05:19

## 2019-12-28 RX ADMIN — Medication 16 MILLIGRAM(S): at 22:03

## 2019-12-28 RX ADMIN — Medication 2 TABLET(S): at 22:03

## 2019-12-28 RX ADMIN — ENOXAPARIN SODIUM 40 MILLIGRAM(S): 100 INJECTION SUBCUTANEOUS at 12:54

## 2019-12-28 RX ADMIN — Medication 3 MILLILITER(S): at 00:51

## 2019-12-28 RX ADMIN — Medication 400 MILLIGRAM(S): at 22:03

## 2019-12-28 RX ADMIN — Medication 16 MILLIGRAM(S): at 11:48

## 2019-12-28 RX ADMIN — Medication 50 GRAM(S): at 02:17

## 2019-12-28 RX ADMIN — Medication 100 MILLIGRAM(S): at 22:02

## 2019-12-28 RX ADMIN — MORPHINE 6 MILLIGRAM(S): 10 SOLUTION ORAL at 12:55

## 2019-12-28 RX ADMIN — Medication 12.5 MILLIGRAM(S): at 12:56

## 2019-12-28 RX ADMIN — OCTREOTIDE ACETATE 100 MICROGRAM(S): 200 INJECTION, SOLUTION INTRAVENOUS; SUBCUTANEOUS at 14:32

## 2019-12-28 RX ADMIN — MORPHINE 6 MILLIGRAM(S): 10 SOLUTION ORAL at 11:48

## 2019-12-28 RX ADMIN — Medication 12.5 MILLIGRAM(S): at 17:34

## 2019-12-28 RX ADMIN — Medication 1 EACH: at 22:03

## 2019-12-28 RX ADMIN — Medication 0.5 MILLIGRAM(S): at 17:40

## 2019-12-28 RX ADMIN — Medication 16 MILLIGRAM(S): at 12:55

## 2019-12-28 RX ADMIN — Medication 400 MILLIGRAM(S): at 14:33

## 2019-12-28 RX ADMIN — Medication 2 TABLET(S): at 17:34

## 2019-12-28 RX ADMIN — OCTREOTIDE ACETATE 100 MICROGRAM(S): 200 INJECTION, SOLUTION INTRAVENOUS; SUBCUTANEOUS at 05:19

## 2019-12-28 RX ADMIN — Medication 2 TABLET(S): at 12:54

## 2019-12-28 RX ADMIN — Medication 0.5 MILLIGRAM(S): at 05:35

## 2019-12-28 RX ADMIN — I.V. FAT EMULSION 20.8 ML/HR: 20 EMULSION INTRAVENOUS at 22:02

## 2019-12-28 RX ADMIN — MORPHINE 6 MILLIGRAM(S): 10 SOLUTION ORAL at 22:03

## 2019-12-28 RX ADMIN — MORPHINE 6 MILLIGRAM(S): 10 SOLUTION ORAL at 17:35

## 2019-12-28 RX ADMIN — PANTOPRAZOLE SODIUM 40 MILLIGRAM(S): 20 TABLET, DELAYED RELEASE ORAL at 12:54

## 2019-12-28 RX ADMIN — Medication 2 TABLET(S): at 11:49

## 2019-12-28 RX ADMIN — Medication 12.5 MILLIGRAM(S): at 05:19

## 2019-12-28 RX ADMIN — SODIUM CHLORIDE 3000 MILLILITER(S): 9 INJECTION, SOLUTION INTRAVENOUS at 20:05

## 2019-12-28 RX ADMIN — Medication 3 MILLILITER(S): at 05:35

## 2019-12-28 RX ADMIN — Medication 100 MILLIGRAM(S): at 12:54

## 2019-12-28 RX ADMIN — Medication 3 MILLILITER(S): at 11:12

## 2019-12-28 RX ADMIN — Medication 12.5 MILLIGRAM(S): at 23:51

## 2019-12-28 RX ADMIN — CHLORHEXIDINE GLUCONATE 1 APPLICATION(S): 213 SOLUTION TOPICAL at 05:19

## 2019-12-28 NOTE — PROGRESS NOTE ADULT - SUBJECTIVE AND OBJECTIVE BOX
HISTORY  74y Male presented 12/6 with abdominal pain, nausea, hematemesis, and poor PO intake for ~2-3 days. Labs significant for an CHI w/ Cr 2.74 and lactate of 9.4. He was also notably tachycardic to the 110s and hypotensive w/ SBP in the 70s. He was given 2 units of PRBCs and 2 L of LR in the ED due to concern for upper GI bleeding. Imaging revealed high grade SBO in the RLQ. Patient was taken to the OR emergently for an exploratory laparotomy, lysis of adhesions, decompression of bowel via enterotomy w/ primary repair, and Abthera VAC placement. Of note, the distal 50% of the bowel appeared dusky but viable. He required vasopressor support with phenylephrine and vasopressin infusions. He received 3000 mL of crystalloid w/ EBL of 10 mL and UOP of 25 mL. Patient was left intubated at the end of the case so SICU was consulted for hemodynamic monitoring. Taken back to the OR on 12/8 and underwent take down of Abthera, washout and re-application of the Abthera vac. Went back to OR on 12/10 night for colectomy. Post-op course complicated by short gut syndrome    24 HOUR EVENTS:  - Discontinued ostomy output repletion   - IR plan to do procedure on Monday      SUBJECTIVE/ROS:  [x] A ten-point review of systems was otherwise negative except as noted.  [ ] Due to altered mental status/intubation, subjective information were not able to be obtained from the patient. History was obtained, to the extent possible, from review of the chart and collateral sources of information.      NEURO  Exam: awake, alert, oriented x4, no acute distress, no focal deficits  Meds: ondansetron Injectable 4 milliGRAM(s) IV Push every 6 hours PRN Nausea and/or Vomiting  opium Tincture 6 milliGRAM(s) Oral <User Schedule>    [x] Adequacy of sedation and pain control has been assessed and adjusted      RESPIRATORY  RR: 39 (12-28-19 @ 00:00) (19 - 39)  SpO2: 93% (12-28-19 @ 00:52) (90% - 100%)  Wt(kg): --  Exam: unlabored, clear to auscultation bilaterally  Mechanical Ventilation:     [ ] Extubation Readiness Assessed  Meds: albuterol/ipratropium for Nebulization 3 milliLiter(s) Nebulizer every 6 hours  buDESOnide    Inhalation Suspension 0.5 milliGRAM(s) Inhalation every 12 hours        CARDIOVASCULAR  HR: 90 (12-28-19 @ 00:52) (83 - 100)  BP: 146/77 (12-28-19 @ 00:00) (90/53 - 146/77)  BP(mean): 105 (12-28-19 @ 00:00) (67 - 105)  ABP: --  ABP(mean): --  Wt(kg): --  CVP(cm H2O): --      Exam: regular rate and rhythm  Cardiac Rhythm: sinus  Perfusion     [x]Adequate   [ ]Inadequate  Mentation   [x]Normal       [ ]Reduced  Extremities  [x]Warm         [ ]Cool  Volume Status [ ]Hypervolemic [x]Euvolemic [ ]Hypovolemic  Meds: metoprolol tartrate 12.5 milliGRAM(s) Oral every 6 hours        GI/NUTRITION  Exam: soft, nontender, nondistended  Diet: dysphagia 1  Meds: diphenoxylate/atropine 2 Tablet(s) Oral <User Schedule>  loperamide 16 milliGRAM(s) Oral <User Schedule>  pantoprazole  Injectable 40 milliGRAM(s) IV Push daily      GENITOURINARY  I&O's Detail    12-26 @ 07:01  -  12-27 @ 07:00  --------------------------------------------------------  IN:    fat emulsion (Fish Oil and Plant Based) 20% Infusion: 228.8 mL    Oral Fluid: 750 mL    sodium chloride 0.9%: 200 mL    sodium chloride 0.9%: 40 mL    Solution: 50 mL    Solution: 1650 mL    TPN (Total Parenteral Nutrition): 2400 mL  Total IN: 5318.8 mL    OUT:    Ileostomy: 2500 mL    Incontinent per Condom Catheter: 2500 mL  Total OUT: 5000 mL    Total NET: 318.8 mL      12-27 @ 07:01  -  12-28 @ 01:03  --------------------------------------------------------  IN:    fat emulsion (Fish Oil and Plant Based) 20% Infusion: 166.4 mL    TPN (Total Parenteral Nutrition): 1899 mL  Total IN: 2065.4 mL    OUT:    Ileostomy: 2200 mL    Incontinent per Condom Catheter: 1200 mL    Voided: 400 mL  Total OUT: 3800 mL    Total NET: -1734.6 mL          12-28    137  |  102  |  16  ----------------------------<  135<H>  4.4   |  27  |  0.49<L>    Ca    8.0<L>      28 Dec 2019 00:12  Phos  3.3     12-28  Mg     1.7     12-28    TPro  5.9<L>  /  Alb  2.2<L>  /  TBili  0.3  /  DBili  0.2  /  AST  18  /  ALT  26  /  AlkPhos  116  12-28    [ ] Martinez catheter, indication: N/A  Meds: fat emulsion (Fish Oil and Plant Based) 20% Infusion 20.8 mL/Hr IV Continuous <Continuous>  Parenteral Nutrition - Adult 1 Each TPN Continuous <Continuous>        HEMATOLOGIC  Meds: enoxaparin Injectable 40 milliGRAM(s) SubCutaneous daily    [x] VTE Prophylaxis                        7.9    11.85 )-----------( 374      ( 28 Dec 2019 00:12 )             25.6       Transfusion     [ ] PRBC   [ ] Platelets   [ ] FFP   [ ] Cryoprecipitate      INFECTIOUS DISEASES  T(C): 36.7 (12-27-19 @ 19:00), Max: 37.2 (12-27-19 @ 03:00)  Wt(kg): --  WBC Count: 11.85 K/uL (12-28 @ 00:12)    Recent Cultures:    Meds: acyclovir   Oral Tab/Cap 400 milliGRAM(s) Oral three times a day        ENDOCRINE  Capillary Blood Glucose    Meds: insulin lispro (HumaLOG) corrective regimen sliding scale   SubCutaneous three times a day before meals  insulin lispro (HumaLOG) corrective regimen sliding scale   SubCutaneous at bedtime  octreotide  Injectable 100 MICROGram(s) SubCutaneous three times a day        ACCESS DEVICES:  [x] Peripheral IV  [ ] Central Venous Line	[ ] R	[ ] L	[ ] IJ	[ ] Fem	[ ] SC	Placed:   [ ] Arterial Line		[ ] R	[ ] L	[ ] Fem	[ ] Rad	[ ] Ax	Placed:   [x] PICC:	RUE placed on 12/14			[ ] Mediport  [ ] Urinary Catheter, Date Placed:   [x] Necessity of urinary, arterial, and venous catheters discussed    OTHER MEDICATIONS:  chlorhexidine 2% Cloths 1 Application(s) Topical <User Schedule>      CODE STATUS:     IMAGING:

## 2019-12-28 NOTE — PROGRESS NOTE ADULT - ASSESSMENT
74 year old male w/ PMH of COPD and EtOH dependence with PSH/o appy, prostate surgery, & spine surgery  septic shock and high grade SBO s/p exploratory laparotomy, lysis of adhesions, decompression of bowel via enterotomy w/ primary repair, and Abthera VAC placement on 12/6; s/p take down of Abthera, washout and re-application of the Abthera vac on 12/8. Now s/p SBR and end ileostomy on 12/10.   TPN consulted to assist w/ management of pt's nutrition in pt w/ prolonged hospital course now tolerating diet but has high Ileostomy output.    - Continue TPN at  goal in 2.4L in pt w/ severe Protein-Calorie Malnutrition w/ high ostomy output. Pt started on Dysphagia 1 Pureed-Nectar Consistency Fluid   - HypoNa & Elevated Bicarb- improving & will continue to monitor-  NaCl increased to 80mEq  - Hyperkalemia - improving w/ decreased KCl at 60 mEq KCl in TPN,possibly due to Octreotide  - HypoPhos resolving, now with rising Phos-  improving - with decrease of NaPHos to 45mMol   - Strict Intake and Output- high ileostomy output -continue with SICU to replete prn - pt started on Octreotide - will take 5-7d to see full benefit. Pt now on increased lomotil, imodium, tincture of opium, & Octreotide   - Consider sending ostomy output for electrolyte evaluation for fecal fat testing to see what pt is absorbing  - Hyperglycemia - improved - Insulin removed from TPN. Fingersticks & ISS coverage - to be ordered by primary team  - For IR drainage of Loculated Lt chest pleural effusion  PICC w/dedicated port for only TPN - maintenance as per protocol  - Weights three times a week  - Monitor BMP, Mg, Ionized Ca, Phosphorus daily  - Continue to monitor Triglycerides weekly and Pre-albumin weekly.    Continue as per SICU / Surgery, will follow with you, D/w primary team  TPN team, pager [phone]  D/w Ana M Siegel 74 year old male w/ PMH of COPD and EtOH dependence with PSH/o appy, prostate surgery, & spine surgery  septic shock and high grade SBO s/p exploratory laparotomy, lysis of adhesions, decompression of bowel via enterotomy w/ primary repair, and Abthera VAC placement on 12/6; s/p take down of Abthera, washout and re-application of the Abthera vac on 12/8. Now s/p SBR and end ileostomy on 12/10.   TPN consulted to assist w/ management of pt's nutrition in pt w/ prolonged hospital course now tolerating diet but has high Ileostomy output.    - Continue TPN at  goal in 2.4L in pt w/ severe Protein-Calorie Malnutrition w/ high ostomy output. Pt started on Dysphagia 1 Pureed-Nectar Consistency Fluid and tolerating  - HypoNa & Elevated Bicarb- improving & will continue to monitor-  NaCl @ 80mEq  - Hyperkalemia - improving w/ decreased KCl at 60 mEq KCl in TPN, possibly due to Octreotide  - HypoPhos resolving, now with rising Phos-  improving - with decrease of NaPHos to 45mMol   - Strict Intake and Output- high ileostomy output -as per SICU to replete prn - pt started on Octreotide - will take 5-7d to see full benefit. Pt now on increased lomotil, imodium, tincture of opium, & Octreotide   - Consider sending ostomy output for electrolyte evaluation for fecal fat testing to see what pt is absorbing  - Hyperglycemia - improved - Insulin removed from TPN. Fingersticks & ISS coverage - to be ordered by primary team  - For IR drainage MOnday 12/30 of Loculated Lt chest pleural effusion  PICC w/dedicated port for only TPN - maintenance as per protocol  - Weights three times a week  - Monitor BMP, Mg, Ionized Ca, Phosphorus daily  - Continue to monitor Triglycerides weekly and Pre-albumin weekly.    Continue as per SICU / Surgery, will follow with you, D/w primary team  TPN team, pager [phone]  D/w Ana M Siegel 74 year old male w/ PMH of COPD and EtOH dependence with PSH/o appy, prostate surgery, & spine surgery  septic shock and high grade SBO s/p exploratory laparotomy, lysis of adhesions, decompression of bowel via enterotomy w/ primary repair, and Abthera VAC placement on 12/6; s/p take down of Abthera, washout and re-application of the Abthera vac on 12/8. Now s/p SBR and end ileostomy on 12/10.   TPN consulted to assist w/ management of pt's nutrition in pt w/ prolonged hospital course now tolerating diet but has high Ileostomy output.    - Continue TPN at  goal in 2.4L in pt w/ severe Protein-Calorie Malnutrition w/ high ostomy output. Pt started on Dysphagia 1 Pureed-Nectar Consistency Fluid and tolerating  - HypoNa & Elevated Bicarb- improving & will continue to monitor-  NaCl @ 80mEq  - Hyperkalemia - improving w/ decreased KCl at 60 mEq KCl in TPN, possibly due to Octreotide  - HypoPhos resolving, now with rising Phos-  improving - with decrease of NaPHos to 45mMol   - Strict Intake and Output- high ileostomy output -as per SICU to replete prn - pt started on Octreotide - will take 5-7d to see full benefit. Pt now on increased lomotil, imodium, tincture of opium, & Octreotide   - Send ostomy output for electrolyte evaluation for fecal fat testing to see what pt is absorbing  - Hyperglycemia - improved - Insulin removed from TPN. Fingersticks & ISS coverage - to be ordered by primary team  - For IR drainage MOnday 12/30 of Loculated Lt chest pleural effusion  PICC w/dedicated port for only TPN - maintenance as per protocol  - Weights three times a week  - Monitor BMP, Mg, Ionized Ca, Phosphorus daily  - Continue to monitor Triglycerides weekly and Pre-albumin weekly.    Continue as per SICU / Surgery, will follow with you, D/w primary team  TPN team, pager [phone]  D/w Ana M Siegel

## 2019-12-28 NOTE — PROGRESS NOTE ADULT - ASSESSMENT
Assessment	  74M s/p ex lap, MIRYAM, closure of enterotomy, abthera vac placement 12/8 early morning. RTOR for exlap/washout 12/9. s/p RTOR ex lap, resection of 150cm bowel, creation of colostomy. Course c/b spontaneous PTX s/p pigtail 12/15, pleuravac, pigtail removed 12/24.    Plan   - evaluated by speech and swallow who suggested dysphagia 1 necator diet, FEES planned 12/30  - f/u CT surgery consult with IR for drainage 12/30  - c/w imodium, lomotil, and opium tincture, monitor ostomy output    - TPN  - appreciate excellent SICU care    [facility]  [id number]

## 2019-12-28 NOTE — PROGRESS NOTE ADULT - SUBJECTIVE AND OBJECTIVE BOX
GENERAL SURGERY PROGRESS NOTE    SUBJECTIVE  Patient seen and examined. No acute events overnight. Reports tolerating diet without nausea, vomiting, +/+ ostomy. Denies fever, chills, SOB, chest pain.     per CTS, IR to tap L chest 12/30  s/s plans for FEES 12/30    OBJECTIVE    PHYSICAL EXAM  General: Appears well, NAD  CHEST: breathing comfortably  CV: appears well perfused  Abdomen: soft, nontender, nondistended, no rebound or guarding, +/+ ostomy, incision erythema unchanged with no induration or fluctuance  Extremities: Grossly symmetric    T(C): 37.3 (12-28-19 @ 07:00), Max: 37.3 (12-28-19 @ 07:00)  HR: 96 (12-28-19 @ 11:13) (80 - 100)  BP: 135/63 (12-28-19 @ 10:00) (92/54 - 147/70)  RR: 29 (12-28-19 @ 10:00) (21 - 39)  SpO2: 98% (12-28-19 @ 11:13) (90% - 100%)    12-27-19 @ 07:01  -  12-28-19 @ 07:00  --------------------------------------------------------  IN: 2798.6 mL / OUT: 5650 mL / NET: -2851.4 mL    12-28-19 @ 07:01  -  12-28-19 @ 11:31  --------------------------------------------------------  IN: 300 mL / OUT: 600 mL / NET: -300 mL        MEDICATIONS  acyclovir   Oral Tab/Cap 400 milliGRAM(s) Oral three times a day  albuterol/ipratropium for Nebulization 3 milliLiter(s) Nebulizer every 6 hours  buDESOnide    Inhalation Suspension 0.5 milliGRAM(s) Inhalation every 12 hours  ceFAZolin   IVPB      chlorhexidine 2% Cloths 1 Application(s) Topical <User Schedule>  diphenoxylate/atropine 2 Tablet(s) Oral <User Schedule>  enoxaparin Injectable 40 milliGRAM(s) SubCutaneous daily  fat emulsion (Fish Oil and Plant Based) 20% Infusion 20.8 mL/Hr IV Continuous <Continuous>  insulin lispro (HumaLOG) corrective regimen sliding scale   SubCutaneous three times a day before meals  insulin lispro (HumaLOG) corrective regimen sliding scale   SubCutaneous at bedtime  loperamide 16 milliGRAM(s) Oral <User Schedule>  metoprolol tartrate 12.5 milliGRAM(s) Oral every 6 hours  octreotide  Injectable 100 MICROGram(s) SubCutaneous three times a day  ondansetron Injectable 4 milliGRAM(s) IV Push every 6 hours PRN  opium Tincture 6 milliGRAM(s) Oral <User Schedule>  pantoprazole  Injectable 40 milliGRAM(s) IV Push daily  Parenteral Nutrition - Adult 1 Each TPN Continuous <Continuous>  Parenteral Nutrition - Adult 1 Each TPN Continuous <Continuous>      LABS                        7.9    11.85 )-----------( 374      ( 28 Dec 2019 00:12 )             25.6     12-28    137  |  102  |  16  ----------------------------<  135<H>  4.4   |  27  |  0.49<L>    Ca    8.0<L>      28 Dec 2019 00:12  Phos  3.3     12-28  Mg     1.7     12-28    TPro  5.9<L>  /  Alb  2.2<L>  /  TBili  0.3  /  DBili  0.2  /  AST  18  /  ALT  26  /  AlkPhos  116  12-28          RADIOLOGY & ADDITIONAL STUDIES

## 2019-12-28 NOTE — PROGRESS NOTE ADULT - ATTENDING COMMENTS
Patient seen  and examined  States he is doing better  Denies nausea or vomiting  States he feels like he has difficulty catching his breath, but 93% on room air  Denies any chest pain    On exam: awake, alert and oriented  Appears to be breathing comfortably on room air, O2 sat is 93%  Abd is soft, not distended, midline incision with some fibrinous exudate  Ileostomy is pink, viable, and functioning >3000mL over last 24 hours    - Appreciate continued SICU care  - Continue Lomotil, loperamide, and tincture of opium  - Continue TPN  - FU CT surgery

## 2019-12-28 NOTE — PROGRESS NOTE ADULT - ASSESSMENT
75 y/o M presenting with septic shock and high grade SBO s/p exploratory laparotomy, lysis of adhesions, decompression of bowel via enterotomy w/ primary repair, and Abthera VAC placement on 12/6; s/p take down of Abthera, washout and re-application of the Abthera vac on 12/8. Now s/p SBR and end ileostomy on 12/10 acute respiratory distress now improving, Pneumothorax, hyperglycemia, delirium.    PLAN:    Neuro: A&Ox3   - Tylenol if needed for pain    Resp: acute respiratory failure upon initial presentation, COPD at baseline, spontaneous left sided pneumothorax s/p pig-tail catheter placement.   - Continue to monitor on RA  - Continue Duonebs, budesonide  - Consult IR for L chest tap as per thoracic - possibly on Monday  - F/u AM CXR for evaluation of PTX    CV: septic shock requiring vasopressor support, now hemodynamically stable   - Metoprolol 12.5 q6 hrs    GI: SBO s/p exploratory laparotomy, lysis of adhesions, decompression of bowel via enterotomy w/ primary repair, and Abthera VAC placement and removal with 150 cm of small bowel removed and end ileostomy.   - Regular diet with TPN (calorie count)  - Monitor ostomy output, lomotil 2 tabs q6hrs & Imodium 16 mg q6hrs & tincture of opium 6 mg q6hrs, octreotide 100mcg SQ TID    Renal: CHI, resolved   - Replete electrolytes as needed  - Monitor I/Os    Heme: no acute issues  - Lovenox for VTE prophylaxis  - SCDs     ID: No active issues   - Trend WBC, monitor fever curve    Endo: no acute issues  - SSI, q6 fingersticks    Dispo: SICU full code

## 2019-12-28 NOTE — PROGRESS NOTE ADULT - SUBJECTIVE AND OBJECTIVE BOX
[facility] NUTRITION SUPPORT--  Attending/ PA FOLLOW UP NOTE      24 hour events/subjective: Denies Palpitations, chest pain, shortness of breath. Denies nausea nor vomiting nor abdominal pain.      24 HOUR EVENTS:  - Discontinued ostomy output repletion   - IR plan to do procedure on Monday    PAST HISTORY  --------------------------------------------------------------------------------  No significant changes to PMH, PSH, FHx, SHx, unless otherwise noted    ALLERGIES & MEDICATIONS  --------------------------------------------------------------------------------  Allergies    No Known Allergies    Intolerances      Standing Inpatient Medications  acyclovir   Oral Tab/Cap 400 milliGRAM(s) Oral three times a day  albuterol/ipratropium for Nebulization 3 milliLiter(s) Nebulizer every 6 hours  buDESOnide    Inhalation Suspension 0.5 milliGRAM(s) Inhalation every 12 hours  chlorhexidine 2% Cloths 1 Application(s) Topical <User Schedule>  diphenoxylate/atropine 2 Tablet(s) Oral <User Schedule>  enoxaparin Injectable 40 milliGRAM(s) SubCutaneous daily  fat emulsion (Fish Oil and Plant Based) 20% Infusion 20.8 mL/Hr IV Continuous <Continuous>  insulin lispro (HumaLOG) corrective regimen sliding scale   SubCutaneous three times a day before meals  insulin lispro (HumaLOG) corrective regimen sliding scale   SubCutaneous at bedtime  loperamide 16 milliGRAM(s) Oral <User Schedule>  metoprolol tartrate 12.5 milliGRAM(s) Oral every 6 hours  octreotide  Injectable 100 MICROGram(s) SubCutaneous three times a day  opium Tincture 6 milliGRAM(s) Oral <User Schedule>  pantoprazole  Injectable 40 milliGRAM(s) IV Push daily  Parenteral Nutrition - Adult 1 Each TPN Continuous <Continuous>    PRN Inpatient Medications  ondansetron Injectable 4 milliGRAM(s) IV Push every 6 hours PRN      REVIEW OF SYSTEMS  --------------------------------------------------------------------------------  Gen: as per HPI  Skin: No rashes  Head/Eyes/Ears/Mouth: No headache;No sore throat  Respiratory: No dyspnea, cough,   CV: No chest pain, PND, orthopnea  GI: as per HPI  : No increased frequency, dysuria, hematuria, nocturia  MSK: No joint pain/swelling; no back pain; no edema  Neuro: No dizziness/lightheadedness, weakness, seizures, numbness, tingling  Psych: No significant nervousness, anxiety, stress, depression    All other systems were reviewed and are negative, except as noted.      LABS/STUDIES  --------------------------------------------------------------------------------              7.9    11.85 >-----------<  374      [12-28-19 @ 00:12]              25.6     137  |  102  |  16  ----------------------------<  135      [12-28-19 @ 00:12]  4.4   |  27  |  0.49        Ca     8.0     [12-28-19 @ 00:12]      Mg     1.7     [12-28-19 @ 00:12]      Phos  3.3     [12-28-19 @ 00:12]    TPro  5.9  /  Alb  2.2  /  TBili  0.3  /  DBili  0.2  /  AST  18  /  ALT  26  /  AlkPhos  116  [12-28-19 @ 00:12]            Glucose, Serum: 135 mg/dL (12-28-19 @ 00:12)    Prealbumin, Serum: 13 mg/dL (12-25-19 @ 04:24)  Prealbumin, Serum: 14 mg/dL (12-24-19 @ 02:35)  Prealbumin, Serum: 14 mg/dL (12-23-19 @ 06:28)  Prealbumin, Serum: 15 mg/dL (12-20-19 @ 02:49)  Prealbumin, Serum: 5 mg/dL (12-13-19 @ 07:46)        12-27-19 @ 07:01  -  12-28-19 @ 07:00  --------------------------------------------------------  IN: 2798.6 mL / OUT: 5650 mL / NET: -2851.4 mL        VITALS/PHYSICAL EXAM  --------------------------------------------------------------------------------  T(C): 37.3 (12-28-19 @ 07:00), Max: 37.3 (12-28-19 @ 07:00)  HR: 83 (12-28-19 @ 08:00) (80 - 100)  BP: 105/59 (12-28-19 @ 08:00) (90/53 - 146/77)  RR: 22 (12-28-19 @ 08:00) (21 - 39)  SpO2: 94% (12-28-19 @ 08:00) (90% - 100%)  Wt(kg): --    Physical Exam:    	Gen: guarded but stable, A&Ox3  	HEENT: NC/AT, PERRL, supple neck, clear oropharynx, dried ruptured blisters  	GI: (+) BS, softly distended, non tender,                    midline incision w/staples c/d/i w/o s/sx infection                   (+)ostomy pink viable- thick bilious liquid stool like material              MSK: FROM, no contractures nor deformities  	Vascular: Equally Warm, no clubbing, cyanosis, nor edema                        Rt PICC dressing c/d/I   	Neuro: No focal deficits, intact sensation, weakened strength  	Psych: Normal affect and mood  	Skin: Warm, without rashes, good turgor [facility] NUTRITION SUPPORT--  Attending/ PA FOLLOW UP NOTE      24 hour events/subjective: Pt reports eating 100% of all his meal trays. Denies nausea nor vomiting nor abdominal pain. Denies Palpitations, chest pain, shortness of breath.     24 HOUR EVENTS:  - Discontinued ostomy output repletion   - IR plan to do procedure on Monday    PAST HISTORY  --------------------------------------------------------------------------------  No significant changes to PMH, PSH, FHx, SHx, unless otherwise noted    ALLERGIES & MEDICATIONS  --------------------------------------------------------------------------------  Allergies    No Known Allergies    Intolerances      Standing Inpatient Medications  acyclovir   Oral Tab/Cap 400 milliGRAM(s) Oral three times a day  albuterol/ipratropium for Nebulization 3 milliLiter(s) Nebulizer every 6 hours  buDESOnide    Inhalation Suspension 0.5 milliGRAM(s) Inhalation every 12 hours  chlorhexidine 2% Cloths 1 Application(s) Topical <User Schedule>  diphenoxylate/atropine 2 Tablet(s) Oral <User Schedule>  enoxaparin Injectable 40 milliGRAM(s) SubCutaneous daily  fat emulsion (Fish Oil and Plant Based) 20% Infusion 20.8 mL/Hr IV Continuous <Continuous>  insulin lispro (HumaLOG) corrective regimen sliding scale   SubCutaneous three times a day before meals  insulin lispro (HumaLOG) corrective regimen sliding scale   SubCutaneous at bedtime  loperamide 16 milliGRAM(s) Oral <User Schedule>  metoprolol tartrate 12.5 milliGRAM(s) Oral every 6 hours  octreotide  Injectable 100 MICROGram(s) SubCutaneous three times a day  opium Tincture 6 milliGRAM(s) Oral <User Schedule>  pantoprazole  Injectable 40 milliGRAM(s) IV Push daily  Parenteral Nutrition - Adult 1 Each TPN Continuous <Continuous>    PRN Inpatient Medications  ondansetron Injectable 4 milliGRAM(s) IV Push every 6 hours PRN      REVIEW OF SYSTEMS  --------------------------------------------------------------------------------  Gen: as per HPI  Skin: No rashes  Head/Eyes/Ears/Mouth: No headache;No sore throat  Respiratory: No dyspnea, cough,   CV: No chest pain, PND, orthopnea  GI: as per HPI  : No increased frequency, dysuria, hematuria, nocturia  MSK: No joint pain/swelling; no back pain; no edema  Neuro: No dizziness/lightheadedness, weakness, seizures, numbness, tingling  Psych: No significant nervousness, anxiety, stress, depression    All other systems were reviewed and are negative, except as noted.      LABS/STUDIES  --------------------------------------------------------------------------------              7.9    11.85 >-----------<  374      [12-28-19 @ 00:12]              25.6     137  |  102  |  16  ----------------------------<  135      [12-28-19 @ 00:12]  4.4   |  27  |  0.49        Ca     8.0     [12-28-19 @ 00:12]      Mg     1.7     [12-28-19 @ 00:12]      Phos  3.3     [12-28-19 @ 00:12]    TPro  5.9  /  Alb  2.2  /  TBili  0.3  /  DBili  0.2  /  AST  18  /  ALT  26  /  AlkPhos  116  [12-28-19 @ 00:12]            Glucose, Serum: 135 mg/dL (12-28-19 @ 00:12)    Prealbumin, Serum: 13 mg/dL (12-25-19 @ 04:24)  Prealbumin, Serum: 14 mg/dL (12-24-19 @ 02:35)  Prealbumin, Serum: 14 mg/dL (12-23-19 @ 06:28)  Prealbumin, Serum: 15 mg/dL (12-20-19 @ 02:49)  Prealbumin, Serum: 5 mg/dL (12-13-19 @ 07:46)        12-27-19 @ 07:01  -  12-28-19 @ 07:00  --------------------------------------------------------  IN: 2798.6 mL / OUT: 5650 mL / NET: -2851.4 mL        VITALS/PHYSICAL EXAM  --------------------------------------------------------------------------------  T(C): 37.3 (12-28-19 @ 07:00), Max: 37.3 (12-28-19 @ 07:00)  HR: 83 (12-28-19 @ 08:00) (80 - 100)  BP: 105/59 (12-28-19 @ 08:00) (90/53 - 146/77)  RR: 22 (12-28-19 @ 08:00) (21 - 39)  SpO2: 94% (12-28-19 @ 08:00) (90% - 100%)  Wt(kg): --    Physical Exam:    	Gen: guarded but stable, A&Ox3  	HEENT: NC/AT, PERRL, supple neck, clear oropharynx, dried ruptured blisters  	GI:  softly distended, non tender,                    midline incision w/staples yellowish exudate and erythemaous along midline open to air, non      smelly, (+)ostomy pink viable- thick bilious liquid stool like material, flatus present              MSK: WWP no edema noted in upper nor lower extrem                        Rt PICC dressing c/d/I   	Neuro: No focal deficits, intact sensation, weakened strength  	Psych: Normal affect and mood  	Skin: Warm, without rashes, good turgor [facility] NUTRITION SUPPORT--  Attending/ PA FOLLOW UP NOTE      24 hour events/subjective: Pt reports eating 100% of all his meal trays. Denies nausea nor vomiting nor abdominal pain. Denies Palpitations, chest pain, shortness of breath.     24 HOUR EVENTS:  - Discontinued ostomy output repletion   - IR plan to do procedure on Monday    PAST HISTORY  --------------------------------------------------------------------------------  No significant changes to PMH, PSH, FHx, SHx, unless otherwise noted    ALLERGIES & MEDICATIONS  --------------------------------------------------------------------------------  Allergies    No Known Allergies    Intolerances      Standing Inpatient Medications  acyclovir   Oral Tab/Cap 400 milliGRAM(s) Oral three times a day  albuterol/ipratropium for Nebulization 3 milliLiter(s) Nebulizer every 6 hours  buDESOnide    Inhalation Suspension 0.5 milliGRAM(s) Inhalation every 12 hours  chlorhexidine 2% Cloths 1 Application(s) Topical <User Schedule>  diphenoxylate/atropine 2 Tablet(s) Oral <User Schedule>  enoxaparin Injectable 40 milliGRAM(s) SubCutaneous daily  fat emulsion (Fish Oil and Plant Based) 20% Infusion 20.8 mL/Hr IV Continuous <Continuous>  insulin lispro (HumaLOG) corrective regimen sliding scale   SubCutaneous three times a day before meals  insulin lispro (HumaLOG) corrective regimen sliding scale   SubCutaneous at bedtime  loperamide 16 milliGRAM(s) Oral <User Schedule>  metoprolol tartrate 12.5 milliGRAM(s) Oral every 6 hours  octreotide  Injectable 100 MICROGram(s) SubCutaneous three times a day  opium Tincture 6 milliGRAM(s) Oral <User Schedule>  pantoprazole  Injectable 40 milliGRAM(s) IV Push daily  Parenteral Nutrition - Adult 1 Each TPN Continuous <Continuous>    PRN Inpatient Medications  ondansetron Injectable 4 milliGRAM(s) IV Push every 6 hours PRN      REVIEW OF SYSTEMS  --------------------------------------------------------------------------------  Gen: as per HPI  Skin: No rashes  Head/Eyes/Ears/Mouth: No headache;No sore throat  Respiratory: No dyspnea, cough,   CV: No chest pain, PND, orthopnea  GI: as per HPI  : No increased frequency, dysuria, hematuria, nocturia  MSK: No joint pain/swelling; no back pain; no edema  Neuro: No dizziness/lightheadedness, weakness, seizures, numbness, tingling  Psych: No significant nervousness, anxiety, stress, depression    All other systems were reviewed and are negative, except as noted.      LABS/STUDIES  --------------------------------------------------------------------------------              7.9    11.85 >-----------<  374      [12-28-19 @ 00:12]              25.6     137  |  102  |  16  ----------------------------<  135      [12-28-19 @ 00:12]  4.4   |  27  |  0.49        Ca     8.0     [12-28-19 @ 00:12]      Mg     1.7     [12-28-19 @ 00:12]      Phos  3.3     [12-28-19 @ 00:12]    TPro  5.9  /  Alb  2.2  /  TBili  0.3  /  DBili  0.2  /  AST  18  /  ALT  26  /  AlkPhos  116  [12-28-19 @ 00:12]            Glucose, Serum: 135 mg/dL (12-28-19 @ 00:12)    Prealbumin, Serum: 13 mg/dL (12-25-19 @ 04:24)  Prealbumin, Serum: 14 mg/dL (12-24-19 @ 02:35)  Prealbumin, Serum: 14 mg/dL (12-23-19 @ 06:28)  Prealbumin, Serum: 15 mg/dL (12-20-19 @ 02:49)  Prealbumin, Serum: 5 mg/dL (12-13-19 @ 07:46)        12-27-19 @ 07:01  -  12-28-19 @ 07:00  --------------------------------------------------------  IN: 2798.6 mL / OUT: 5650 mL / NET: -2851.4 mL        VITALS/PHYSICAL EXAM  --------------------------------------------------------------------------------  T(C): 37.3 (12-28-19 @ 07:00), Max: 37.3 (12-28-19 @ 07:00)  HR: 83 (12-28-19 @ 08:00) (80 - 100)  BP: 105/59 (12-28-19 @ 08:00) (90/53 - 146/77)  RR: 22 (12-28-19 @ 08:00) (21 - 39)  SpO2: 94% (12-28-19 @ 08:00) (90% - 100%)  Wt(kg): --    Physical Exam:    	Gen: guarded but stable, A&Ox3  	HEENT: NC/AT, PERRL,               Neck: Trachea midline              Abd:  softly distended, non tender,                    midline incision w/staples yellowish exudate and erythematous along midline open to air, non      smelly, (+)ostomy pink viable- thick bilious liquid stool like material, flatus present              MSK: WWP no edema noted in upper nor lower extrem                        Rt PICC dressing c/d/I   	Neuro: No focal deficits, intact sensation, weakened strength  	Psych: Normal affect and mood  	Skin: Warm, without rashes, good turgor

## 2019-12-28 NOTE — PROGRESS NOTE ADULT - ATTENDING COMMENTS
Pt seen and examined with residents and PA, agree with above.    1. 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 L PTX s/p pleural pigtail catheter, air leak resolved, now with L pleural effusion with LLL consolidation on my personal review of CT chest and today's CXR:  - IR for drainage on Monday    3. Oral lesions:  - Acyclovir for possible HSV    4. Mild hyponatremia:  - Resolved    5. Dysphagia:   - Follow up FEES on Monday    6. Mild cellulitis around abdominal wound:  - Ancef for five days

## 2019-12-29 LAB
ALBUMIN SERPL ELPH-MCNC: 2.2 G/DL — LOW (ref 3.3–5)
ALP SERPL-CCNC: 115 U/L — SIGNIFICANT CHANGE UP (ref 40–120)
ALT FLD-CCNC: 27 U/L — SIGNIFICANT CHANGE UP (ref 10–45)
ANION GAP SERPL CALC-SCNC: 6 MMOL/L — SIGNIFICANT CHANGE UP (ref 5–17)
ANION GAP SERPL CALC-SCNC: 9 MMOL/L — SIGNIFICANT CHANGE UP (ref 5–17)
AST SERPL-CCNC: 15 U/L — SIGNIFICANT CHANGE UP (ref 10–40)
BILIRUB DIRECT SERPL-MCNC: 0.2 MG/DL — SIGNIFICANT CHANGE UP (ref 0–0.2)
BILIRUB INDIRECT FLD-MCNC: 0.2 MG/DL — SIGNIFICANT CHANGE UP (ref 0.2–1)
BILIRUB SERPL-MCNC: 0.4 MG/DL — SIGNIFICANT CHANGE UP (ref 0.2–1.2)
BUN SERPL-MCNC: 16 MG/DL — SIGNIFICANT CHANGE UP (ref 7–23)
BUN SERPL-MCNC: 16 MG/DL — SIGNIFICANT CHANGE UP (ref 7–23)
CALCIUM SERPL-MCNC: 7.7 MG/DL — LOW (ref 8.4–10.5)
CALCIUM SERPL-MCNC: 7.9 MG/DL — LOW (ref 8.4–10.5)
CHLORIDE SERPL-SCNC: 96 MMOL/L — SIGNIFICANT CHANGE UP (ref 96–108)
CHLORIDE SERPL-SCNC: 97 MMOL/L — SIGNIFICANT CHANGE UP (ref 96–108)
CO2 SERPL-SCNC: 26 MMOL/L — SIGNIFICANT CHANGE UP (ref 22–31)
CO2 SERPL-SCNC: 31 MMOL/L — SIGNIFICANT CHANGE UP (ref 22–31)
CREAT SERPL-MCNC: 0.45 MG/DL — LOW (ref 0.5–1.3)
CREAT SERPL-MCNC: 0.47 MG/DL — LOW (ref 0.5–1.3)
GLUCOSE BLDC GLUCOMTR-MCNC: 125 MG/DL — HIGH (ref 70–99)
GLUCOSE BLDC GLUCOMTR-MCNC: 127 MG/DL — HIGH (ref 70–99)
GLUCOSE BLDC GLUCOMTR-MCNC: 128 MG/DL — HIGH (ref 70–99)
GLUCOSE BLDC GLUCOMTR-MCNC: 172 MG/DL — HIGH (ref 70–99)
GLUCOSE SERPL-MCNC: 115 MG/DL — HIGH (ref 70–99)
GLUCOSE SERPL-MCNC: 123 MG/DL — HIGH (ref 70–99)
HCT VFR BLD CALC: 26.3 % — LOW (ref 39–50)
HGB BLD-MCNC: 8.3 G/DL — LOW (ref 13–17)
MAGNESIUM SERPL-MCNC: 1.8 MG/DL — SIGNIFICANT CHANGE UP (ref 1.6–2.6)
MAGNESIUM SERPL-MCNC: 2 MG/DL — SIGNIFICANT CHANGE UP (ref 1.6–2.6)
MCHC RBC-ENTMCNC: 29.9 PG — SIGNIFICANT CHANGE UP (ref 27–34)
MCHC RBC-ENTMCNC: 31.6 GM/DL — LOW (ref 32–36)
MCV RBC AUTO: 94.6 FL — SIGNIFICANT CHANGE UP (ref 80–100)
NRBC # BLD: 0 /100 WBCS — SIGNIFICANT CHANGE UP (ref 0–0)
OSMOLALITY SERPL: 285 MOSMOL/KG — SIGNIFICANT CHANGE UP (ref 280–301)
OSMOLALITY UR: 436 MOS/KG — SIGNIFICANT CHANGE UP (ref 300–900)
PHOSPHATE SERPL-MCNC: 3.3 MG/DL — SIGNIFICANT CHANGE UP (ref 2.5–4.5)
PHOSPHATE SERPL-MCNC: 4 MG/DL — SIGNIFICANT CHANGE UP (ref 2.5–4.5)
PLATELET # BLD AUTO: 384 K/UL — SIGNIFICANT CHANGE UP (ref 150–400)
POTASSIUM SERPL-MCNC: 4.1 MMOL/L — SIGNIFICANT CHANGE UP (ref 3.5–5.3)
POTASSIUM SERPL-MCNC: 4.2 MMOL/L — SIGNIFICANT CHANGE UP (ref 3.5–5.3)
POTASSIUM SERPL-SCNC: 4.1 MMOL/L — SIGNIFICANT CHANGE UP (ref 3.5–5.3)
POTASSIUM SERPL-SCNC: 4.2 MMOL/L — SIGNIFICANT CHANGE UP (ref 3.5–5.3)
PROT SERPL-MCNC: 5.8 G/DL — LOW (ref 6–8.3)
RBC # BLD: 2.78 M/UL — LOW (ref 4.2–5.8)
RBC # FLD: 13.6 % — SIGNIFICANT CHANGE UP (ref 10.3–14.5)
SODIUM SERPL-SCNC: 131 MMOL/L — LOW (ref 135–145)
SODIUM SERPL-SCNC: 134 MMOL/L — LOW (ref 135–145)
SODIUM UR-SCNC: 75 MMOL/L — SIGNIFICANT CHANGE UP
WBC # BLD: 14.66 K/UL — HIGH (ref 3.8–10.5)
WBC # FLD AUTO: 14.66 K/UL — HIGH (ref 3.8–10.5)

## 2019-12-29 PROCEDURE — 99232 SBSQ HOSP IP/OBS MODERATE 35: CPT

## 2019-12-29 RX ORDER — SODIUM CHLORIDE 9 MG/ML
500 INJECTION, SOLUTION INTRAVENOUS ONCE
Refills: 0 | Status: COMPLETED | OUTPATIENT
Start: 2019-12-29 | End: 2019-12-29

## 2019-12-29 RX ORDER — ELECTROLYTE SOLUTION,INJ
1 VIAL (ML) INTRAVENOUS
Refills: 0 | Status: DISCONTINUED | OUTPATIENT
Start: 2019-12-29 | End: 2019-12-29

## 2019-12-29 RX ORDER — SODIUM CHLORIDE 9 MG/ML
1000 INJECTION INTRAMUSCULAR; INTRAVENOUS; SUBCUTANEOUS
Refills: 0 | Status: DISCONTINUED | OUTPATIENT
Start: 2019-12-29 | End: 2019-12-31

## 2019-12-29 RX ORDER — DIPHENHYDRAMINE HCL 50 MG
50 CAPSULE ORAL ONCE
Refills: 0 | Status: COMPLETED | OUTPATIENT
Start: 2019-12-30 | End: 2019-12-30

## 2019-12-29 RX ORDER — I.V. FAT EMULSION 20 G/100ML
20.8 EMULSION INTRAVENOUS
Qty: 50 | Refills: 0 | Status: DISCONTINUED | OUTPATIENT
Start: 2019-12-29 | End: 2019-12-30

## 2019-12-29 RX ORDER — MAGNESIUM SULFATE 500 MG/ML
2 VIAL (ML) INJECTION ONCE
Refills: 0 | Status: COMPLETED | OUTPATIENT
Start: 2019-12-29 | End: 2019-12-29

## 2019-12-29 RX ADMIN — Medication 100 MILLIGRAM(S): at 05:00

## 2019-12-29 RX ADMIN — Medication 16 MILLIGRAM(S): at 14:38

## 2019-12-29 RX ADMIN — Medication 16 MILLIGRAM(S): at 23:14

## 2019-12-29 RX ADMIN — Medication 2 TABLET(S): at 17:45

## 2019-12-29 RX ADMIN — SODIUM CHLORIDE 10 MILLILITER(S): 9 INJECTION INTRAMUSCULAR; INTRAVENOUS; SUBCUTANEOUS at 23:06

## 2019-12-29 RX ADMIN — ENOXAPARIN SODIUM 40 MILLIGRAM(S): 100 INJECTION SUBCUTANEOUS at 12:16

## 2019-12-29 RX ADMIN — Medication 16 MILLIGRAM(S): at 08:31

## 2019-12-29 RX ADMIN — SODIUM CHLORIDE 2000 MILLILITER(S): 9 INJECTION, SOLUTION INTRAVENOUS at 06:34

## 2019-12-29 RX ADMIN — CHLORHEXIDINE GLUCONATE 1 APPLICATION(S): 213 SOLUTION TOPICAL at 05:00

## 2019-12-29 RX ADMIN — PANTOPRAZOLE SODIUM 40 MILLIGRAM(S): 20 TABLET, DELAYED RELEASE ORAL at 12:16

## 2019-12-29 RX ADMIN — Medication 400 MILLIGRAM(S): at 23:08

## 2019-12-29 RX ADMIN — Medication 2 TABLET(S): at 08:32

## 2019-12-29 RX ADMIN — MORPHINE 6 MILLIGRAM(S): 10 SOLUTION ORAL at 17:45

## 2019-12-29 RX ADMIN — Medication 1: at 06:29

## 2019-12-29 RX ADMIN — OCTREOTIDE ACETATE 100 MICROGRAM(S): 200 INJECTION, SOLUTION INTRAVENOUS; SUBCUTANEOUS at 14:38

## 2019-12-29 RX ADMIN — OCTREOTIDE ACETATE 100 MICROGRAM(S): 200 INJECTION, SOLUTION INTRAVENOUS; SUBCUTANEOUS at 05:00

## 2019-12-29 RX ADMIN — Medication 3 MILLILITER(S): at 00:36

## 2019-12-29 RX ADMIN — Medication 2 TABLET(S): at 12:16

## 2019-12-29 RX ADMIN — Medication 0.5 MILLIGRAM(S): at 05:06

## 2019-12-29 RX ADMIN — Medication 3 MILLILITER(S): at 11:13

## 2019-12-29 RX ADMIN — Medication 400 MILLIGRAM(S): at 05:00

## 2019-12-29 RX ADMIN — MORPHINE 6 MILLIGRAM(S): 10 SOLUTION ORAL at 12:16

## 2019-12-29 RX ADMIN — Medication 100 MILLIGRAM(S): at 23:06

## 2019-12-29 RX ADMIN — MORPHINE 6 MILLIGRAM(S): 10 SOLUTION ORAL at 23:12

## 2019-12-29 RX ADMIN — Medication 16 MILLIGRAM(S): at 17:45

## 2019-12-29 RX ADMIN — MORPHINE 6 MILLIGRAM(S): 10 SOLUTION ORAL at 08:32

## 2019-12-29 RX ADMIN — Medication 400 MILLIGRAM(S): at 14:38

## 2019-12-29 RX ADMIN — OCTREOTIDE ACETATE 100 MICROGRAM(S): 200 INJECTION, SOLUTION INTRAVENOUS; SUBCUTANEOUS at 23:11

## 2019-12-29 RX ADMIN — Medication 12.5 MILLIGRAM(S): at 05:00

## 2019-12-29 RX ADMIN — Medication 3 MILLILITER(S): at 05:06

## 2019-12-29 RX ADMIN — Medication 50 GRAM(S): at 01:32

## 2019-12-29 RX ADMIN — Medication 100 MILLIGRAM(S): at 14:39

## 2019-12-29 RX ADMIN — Medication 32 MILLIGRAM(S): at 18:02

## 2019-12-29 RX ADMIN — Medication 2 TABLET(S): at 23:11

## 2019-12-29 NOTE — PROGRESS NOTE ADULT - SUBJECTIVE AND OBJECTIVE BOX
Surgery Progress Note  Patient is a 74y old  Male who presents with a chief complaint of abd. pain (29 Dec 2019 01:04)      SUBJECTIVE: Patient seen and examined at bedside with surgical team, patient without complaints.     24 HOUR EVENTS:  - Restarted Ancef x 5 days for erythematous midline wound   - 500ml of fluid administered as a bolus for hypotension to 80s to good effect.     Vital Signs Last 24 Hrs  T(C): 37 (29 Dec 2019 03:00), Max: 37.1 (28 Dec 2019 11:00)  T(F): 98.6 (29 Dec 2019 03:00), Max: 98.8 (28 Dec 2019 11:00)  HR: 90 (29 Dec 2019 07:00) (83 - 108)  BP: 105/53 (29 Dec 2019 07:00) (82/47 - 148/72)  BP(mean): 77 (29 Dec 2019 07:00) (59 - 102)  RR: 22 (29 Dec 2019 07:00) (22 - 40)  SpO2: 79% (29 Dec 2019 07:00) (79% - 100%)    Physical Exam  Constitutional: NAD  Respiratory: breathing comfortably on RA  Abd:   Ext: moving all 4 ext spontaneously     I&O's Detail    28 Dec 2019 07:01  -  29 Dec 2019 07:00  --------------------------------------------------------  IN:    fat emulsion (Fish Oil and Plant Based) 20% Infusion: 291.2 mL    Solution: 1250 mL    TPN (Total Parenteral Nutrition): 2400 mL  Total IN: 3941.2 mL    OUT:    Ileostomy: 2550 mL    Voided: 2300 mL  Total OUT: 4850 mL    Total NET: -908.8 mL      MEDICATIONS  (STANDING):  acyclovir   Oral Tab/Cap 400 milliGRAM(s) Oral three times a day  albuterol/ipratropium for Nebulization 3 milliLiter(s) Nebulizer every 6 hours  buDESOnide    Inhalation Suspension 0.5 milliGRAM(s) Inhalation every 12 hours  ceFAZolin   IVPB      ceFAZolin   IVPB 1000 milliGRAM(s) IV Intermittent every 8 hours  chlorhexidine 2% Cloths 1 Application(s) Topical <User Schedule>  diphenoxylate/atropine 2 Tablet(s) Oral <User Schedule>  enoxaparin Injectable 40 milliGRAM(s) SubCutaneous daily  fat emulsion (Fish Oil and Plant Based) 20% Infusion 20.8 mL/Hr (20.8 mL/Hr) IV Continuous <Continuous>  insulin lispro (HumaLOG) corrective regimen sliding scale   SubCutaneous three times a day before meals  insulin lispro (HumaLOG) corrective regimen sliding scale   SubCutaneous at bedtime  loperamide 16 milliGRAM(s) Oral <User Schedule>  metoprolol tartrate 12.5 milliGRAM(s) Oral every 6 hours  octreotide  Injectable 100 MICROGram(s) SubCutaneous three times a day  opium Tincture 6 milliGRAM(s) Oral <User Schedule>  pantoprazole  Injectable 40 milliGRAM(s) IV Push daily  Parenteral Nutrition - Adult 1 Each (100 mL/Hr) TPN Continuous <Continuous>    MEDICATIONS  (PRN):  ondansetron Injectable 4 milliGRAM(s) IV Push every 6 hours PRN Nausea and/or Vomiting      LABS:                        8.3    14.66 )-----------( 384      ( 29 Dec 2019 00:00 )             26.3     12-29    131<L>  |  96  |  16  ----------------------------<  115<H>  4.1   |  26  |  0.47<L>    Ca    7.7<L>      29 Dec 2019 00:00  Phos  3.3     12-29  Mg     1.8     12-29    TPro  5.8<L>  /  Alb  2.2<L>  /  TBili  0.4  /  DBili  0.2  /  AST  15  /  ALT  27  /  AlkPhos  115  12-29      LIVER FUNCTIONS - ( 29 Dec 2019 00:00 )  Alb: 2.2 g/dL / Pro: 5.8 g/dL / ALK PHOS: 115 U/L / ALT: 27 U/L / AST: 15 U/L / GGT: x

## 2019-12-29 NOTE — PROGRESS NOTE ADULT - ASSESSMENT
Assessment   75 y/o M presenting with septic shock and high grade SBO s/p exploratory laparotomy, lysis of adhesions, decompression of bowel via enterotomy w/ primary repair, and Abthera VAC placement on 12/6; s/p take down of Abthera, washout and re-application of the Abthera vac on 12/8. Now s/p SBR and end ileostomy on 12/10 acute respiratory distress now improving, Pneumothorax, hyperglycemia, delirium.    Plan   - IR drainage Monday   - FEE monday   - c/w abx for wound infection   - continued care per SICU

## 2019-12-29 NOTE — PROGRESS NOTE ADULT - SUBJECTIVE AND OBJECTIVE BOX
[facility] NUTRITION SUPPORT--  Attending/ PA FOLLOW UP NOTE      24 hour events/subjective: Denies Palpitations, chest pain, shortness of breath. Denies nausea nor vomiting nor abdominal pain.    24 HOUR EVENTS:  - Restarted Ancef x 5 days for erythematous midline wound   - 500ml of fluid administered as a bolus for hypotension to 80s to good effect.     PAST HISTORY  --------------------------------------------------------------------------------  No significant changes to PMH, PSH, FHx, SHx, unless otherwise noted    ALLERGIES & MEDICATIONS  --------------------------------------------------------------------------------  Allergies    No Known Allergies    Intolerances      Standing Inpatient Medications  acyclovir   Oral Tab/Cap 400 milliGRAM(s) Oral three times a day  albuterol/ipratropium for Nebulization 3 milliLiter(s) Nebulizer every 6 hours  buDESOnide    Inhalation Suspension 0.5 milliGRAM(s) Inhalation every 12 hours  ceFAZolin   IVPB      ceFAZolin   IVPB 1000 milliGRAM(s) IV Intermittent every 8 hours  chlorhexidine 2% Cloths 1 Application(s) Topical <User Schedule>  diphenoxylate/atropine 2 Tablet(s) Oral <User Schedule>  enoxaparin Injectable 40 milliGRAM(s) SubCutaneous daily  fat emulsion (Fish Oil and Plant Based) 20% Infusion 20.8 mL/Hr IV Continuous <Continuous>  insulin lispro (HumaLOG) corrective regimen sliding scale   SubCutaneous three times a day before meals  insulin lispro (HumaLOG) corrective regimen sliding scale   SubCutaneous at bedtime  loperamide 16 milliGRAM(s) Oral <User Schedule>  metoprolol tartrate 12.5 milliGRAM(s) Oral every 6 hours  octreotide  Injectable 100 MICROGram(s) SubCutaneous three times a day  opium Tincture 6 milliGRAM(s) Oral <User Schedule>  pantoprazole  Injectable 40 milliGRAM(s) IV Push daily  Parenteral Nutrition - Adult 1 Each TPN Continuous <Continuous>    PRN Inpatient Medications  ondansetron Injectable 4 milliGRAM(s) IV Push every 6 hours PRN      REVIEW OF SYSTEMS  --------------------------------------------------------------------------------  Gen: as per HPI  Skin: No rashes  Head/Eyes/Ears/Mouth: No headache;No sore throat  Respiratory: No dyspnea, cough,   CV: No chest pain, PND, orthopnea  GI: as per HPI  : No increased frequency, dysuria, hematuria, nocturia  MSK: No joint pain/swelling; no back pain; no edema  Neuro: No dizziness/lightheadedness, weakness, seizures, numbness, tingling  Psych: No significant nervousness, anxiety, stress, depression    All other systems were reviewed and are negative, except as noted.      LABS/STUDIES  --------------------------------------------------------------------------------              8.3    14.66 >-----------<  384      [12-29-19 @ 00:00]              26.3     131  |  96  |  16  ----------------------------<  115      [12-29-19 @ 00:00]  4.1   |  26  |  0.47        Ca     7.7     [12-29-19 @ 00:00]      Mg     1.8     [12-29-19 @ 00:00]      Phos  3.3     [12-29-19 @ 00:00]    TPro  5.8  /  Alb  2.2  /  TBili  0.4  /  DBili  0.2  /  AST  15  /  ALT  27  /  AlkPhos  115  [12-29-19 @ 00:00]            Glucose, Serum: 115 mg/dL (12-29-19 @ 00:00)    PrealbuminPrealbumin, Serum: 13 mg/dL (12-25-19 @ 04:24)  Prealbumin, Serum: 14 mg/dL (12-24-19 @ 02:35)  Prealbumin, Serum: 14 mg/dL (12-23-19 @ 06:28)  Prealbumin, Serum: 15 mg/dL (12-20-19 @ 02:49)  Prealbumin, Serum: 5 mg/dL (12-13-19 @ 07:46)    Triglycerides      12-28-19 @ 07:01  -  12-29-19 @ 07:00  --------------------------------------------------------  IN: 3941.2 mL / OUT: 4850 mL / NET: -908.8 mL        VITALS/PHYSICAL EXAM  --------------------------------------------------------------------------------  T(C): 37 (12-29-19 @ 03:00), Max: 37.1 (12-28-19 @ 11:00)  HR: 90 (12-29-19 @ 07:00) (83 - 108)  BP: 105/53 (12-29-19 @ 07:00) (82/47 - 148/72)  RR: 22 (12-29-19 @ 07:00) (22 - 40)  SpO2: 79% (12-29-19 @ 07:00) (79% - 100%)  Wt(kg): --    Physical Exam:    	Gen: guarded but stable, A&Ox3  	HEENT: NC/AT, PERRL, supple neck, clear oropharynx, dried ruptured blisters  	GI:  softly distended, non tender,                    midline incision w/staples yellowish exudate and erythematous along midline open to air, non      smelly, (+)ostomy pink viable- thick bilious liquid stool like material, flatus present              MSK: WWP no edema noted in upper nor lower extrem                        Rt PICC dressing c/d/I   	Neuro: No focal deficits, intact sensation, weakened strength  	Psych: Normal affect and mood  	Skin: Warm, without rashes, good turgor    .  .  .  . [facility] NUTRITION SUPPORT--  Attending/ PA FOLLOW UP NOTE    24 hour events/subjective: Pt on TPN and toleratingDenies Palpitations, chest pain, shortness of breath. Denies nausea nor vomiting nor abdominal pain.    24 HOUR EVENTS:  - Restarted Ancef x 5 days for erythematous midline wound   - 500ml of fluid administered as a bolus for hypotension to 80s to good effect.     PAST HISTORY  --------------------------------------------------------------------------------  No significant changes to PMH, PSH, FHx, SHx, unless otherwise noted    ALLERGIES & MEDICATIONS  --------------------------------------------------------------------------------  Allergies    No Known Allergies    Intolerances      Standing Inpatient Medications  acyclovir   Oral Tab/Cap 400 milliGRAM(s) Oral three times a day  albuterol/ipratropium for Nebulization 3 milliLiter(s) Nebulizer every 6 hours  buDESOnide    Inhalation Suspension 0.5 milliGRAM(s) Inhalation every 12 hours  ceFAZolin   IVPB      ceFAZolin   IVPB 1000 milliGRAM(s) IV Intermittent every 8 hours  chlorhexidine 2% Cloths 1 Application(s) Topical <User Schedule>  diphenoxylate/atropine 2 Tablet(s) Oral <User Schedule>  enoxaparin Injectable 40 milliGRAM(s) SubCutaneous daily  fat emulsion (Fish Oil and Plant Based) 20% Infusion 20.8 mL/Hr IV Continuous <Continuous>  insulin lispro (HumaLOG) corrective regimen sliding scale   SubCutaneous three times a day before meals  insulin lispro (HumaLOG) corrective regimen sliding scale   SubCutaneous at bedtime  loperamide 16 milliGRAM(s) Oral <User Schedule>  metoprolol tartrate 12.5 milliGRAM(s) Oral every 6 hours  octreotide  Injectable 100 MICROGram(s) SubCutaneous three times a day  opium Tincture 6 milliGRAM(s) Oral <User Schedule>  pantoprazole  Injectable 40 milliGRAM(s) IV Push daily  Parenteral Nutrition - Adult 1 Each TPN Continuous <Continuous>    PRN Inpatient Medications  ondansetron Injectable 4 milliGRAM(s) IV Push every 6 hours PRN      REVIEW OF SYSTEMS  --------------------------------------------------------------------------------  Gen: as per HPI  Skin: No rashes  Head/Eyes/Ears/Mouth: No headache;No sore throat  Respiratory: No dyspnea, cough,   CV: No chest pain, PND, orthopnea  GI: as per HPI  : No increased frequency, dysuria, hematuria, nocturia  MSK: No joint pain/swelling; no back pain; no edema  Neuro: No dizziness/lightheadedness, weakness, seizures, numbness, tingling  Psych: No significant nervousness, anxiety, stress, depression    All other systems were reviewed and are negative, except as noted.      LABS/STUDIES  --------------------------------------------------------------------------------              8.3    14.66 >-----------<  384      [12-29-19 @ 00:00]              26.3     131  |  96  |  16  ----------------------------<  115      [12-29-19 @ 00:00]  4.1   |  26  |  0.47        Ca     7.7     [12-29-19 @ 00:00]      Mg     1.8     [12-29-19 @ 00:00]      Phos  3.3     [12-29-19 @ 00:00]    TPro  5.8  /  Alb  2.2  /  TBili  0.4  /  DBili  0.2  /  AST  15  /  ALT  27  /  AlkPhos  115  [12-29-19 @ 00:00]            Glucose, Serum: 115 mg/dL (12-29-19 @ 00:00)    PrealbuminPrealbumin, Serum: 13 mg/dL (12-25-19 @ 04:24)  Prealbumin, Serum: 14 mg/dL (12-24-19 @ 02:35)  Prealbumin, Serum: 14 mg/dL (12-23-19 @ 06:28)  Prealbumin, Serum: 15 mg/dL (12-20-19 @ 02:49)  Prealbumin, Serum: 5 mg/dL (12-13-19 @ 07:46)    Triglycerides      12-28-19 @ 07:01  -  12-29-19 @ 07:00  --------------------------------------------------------  IN: 3941.2 mL / OUT: 4850 mL / NET: -908.8 mL        VITALS/PHYSICAL EXAM  --------------------------------------------------------------------------------  T(C): 37 (12-29-19 @ 03:00), Max: 37.1 (12-28-19 @ 11:00)  HR: 90 (12-29-19 @ 07:00) (83 - 108)  BP: 105/53 (12-29-19 @ 07:00) (82/47 - 148/72)  RR: 22 (12-29-19 @ 07:00) (22 - 40)  SpO2: 79% (12-29-19 @ 07:00) (79% - 100%)  Wt(kg): --    Physical Exam:    	Gen: guarded but stable, A&Ox3  	HEENT: NC/AT, PERRL, supple neck, clear oropharynx, dried ruptured blisters  	GI:  softly distended, non tender, midline incision w/staples yellowish exudate and erythematous along midline open to air, non smelly, (+)ostomy pink viable- thick bilious liquid stool like material, flatus present.              MSK: WWP no edema noted in upper nor lower extrem.  Rt PICC dressing c/d/I   	Neuro: No focal deficits, intact sensation, weakened strength  	Psych: Normal affect and mood  	Skin: Warm, without rashes, good turgor    .  .  .  . [facility] NUTRITION SUPPORT--  Attending/ PA FOLLOW UP NOTE    24 hour events/subjective: Pt on TPN and tolerating his diet without issues. Denies palpitations, chest pain, shortness of breath. Denies nausea nor vomiting nor abdominal pain. Denies f/c/NS.     24 HOUR EVENTS:  - Restarted Ancef x 5 days for erythematous midline wound   - 500ml of fluid administered as a bolus for hypotension to 80s to good effect.     PAST HISTORY  --------------------------------------------------------------------------------  No significant changes to PMH, PSH, FHx, SHx, unless otherwise noted    ALLERGIES & MEDICATIONS  --------------------------------------------------------------------------------  Allergies    No Known Allergies    Intolerances      Standing Inpatient Medications  acyclovir   Oral Tab/Cap 400 milliGRAM(s) Oral three times a day  albuterol/ipratropium for Nebulization 3 milliLiter(s) Nebulizer every 6 hours  buDESOnide    Inhalation Suspension 0.5 milliGRAM(s) Inhalation every 12 hours  ceFAZolin   IVPB      ceFAZolin   IVPB 1000 milliGRAM(s) IV Intermittent every 8 hours  chlorhexidine 2% Cloths 1 Application(s) Topical <User Schedule>  diphenoxylate/atropine 2 Tablet(s) Oral <User Schedule>  enoxaparin Injectable 40 milliGRAM(s) SubCutaneous daily  fat emulsion (Fish Oil and Plant Based) 20% Infusion 20.8 mL/Hr IV Continuous <Continuous>  insulin lispro (HumaLOG) corrective regimen sliding scale   SubCutaneous three times a day before meals  insulin lispro (HumaLOG) corrective regimen sliding scale   SubCutaneous at bedtime  loperamide 16 milliGRAM(s) Oral <User Schedule>  metoprolol tartrate 12.5 milliGRAM(s) Oral every 6 hours  octreotide  Injectable 100 MICROGram(s) SubCutaneous three times a day  opium Tincture 6 milliGRAM(s) Oral <User Schedule>  pantoprazole  Injectable 40 milliGRAM(s) IV Push daily  Parenteral Nutrition - Adult 1 Each TPN Continuous <Continuous>    PRN Inpatient Medications  ondansetron Injectable 4 milliGRAM(s) IV Push every 6 hours PRN      REVIEW OF SYSTEMS  --------------------------------------------------------------------------------  Gen: as per HPI  Skin: No rashes  Head/Eyes/Ears/Mouth: No headache;No sore throat  Respiratory: No dyspnea, cough,   CV: No chest pain, PND, orthopnea  GI: as per HPI  : No increased frequency, dysuria, hematuria, nocturia  MSK: No joint pain/swelling; no back pain; no edema  Neuro: No dizziness/lightheadedness, weakness, seizures, numbness, tingling  Psych: No significant nervousness, anxiety, stress, depression    All other systems were reviewed and are negative, except as noted.      LABS/STUDIES  --------------------------------------------------------------------------------              8.3    14.66 >-----------<  384      [12-29-19 @ 00:00]              26.3     131  |  96  |  16  ----------------------------<  115      [12-29-19 @ 00:00]  4.1   |  26  |  0.47        Ca     7.7     [12-29-19 @ 00:00]      Mg     1.8     [12-29-19 @ 00:00]      Phos  3.3     [12-29-19 @ 00:00]    TPro  5.8  /  Alb  2.2  /  TBili  0.4  /  DBili  0.2  /  AST  15  /  ALT  27  /  AlkPhos  115  [12-29-19 @ 00:00]            Glucose, Serum: 115 mg/dL (12-29-19 @ 00:00)    PrealbuminPrealbumin, Serum: 13 mg/dL (12-25-19 @ 04:24)  Prealbumin, Serum: 14 mg/dL (12-24-19 @ 02:35)  Prealbumin, Serum: 14 mg/dL (12-23-19 @ 06:28)  Prealbumin, Serum: 15 mg/dL (12-20-19 @ 02:49)  Prealbumin, Serum: 5 mg/dL (12-13-19 @ 07:46)    Triglycerides      12-28-19 @ 07:01  -  12-29-19 @ 07:00  --------------------------------------------------------  IN: 3941.2 mL / OUT: 4850 mL / NET: -908.8 mL        VITALS/PHYSICAL EXAM  --------------------------------------------------------------------------------  T(C): 37 (12-29-19 @ 03:00), Max: 37.1 (12-28-19 @ 11:00)  HR: 90 (12-29-19 @ 07:00) (83 - 108)  BP: 105/53 (12-29-19 @ 07:00) (82/47 - 148/72)  RR: 22 (12-29-19 @ 07:00) (22 - 40)  SpO2: 79% (12-29-19 @ 07:00) (79% - 100%)  Wt(kg): --    Physical Exam:    	Gen: guarded but stable, A&Ox3  	HEENT: NC/AT, PERRL, supple neck, clear oropharynx, dried ruptured blisters  	GI:  softly distended, non tender, midline incision w/staples yellowish exudate and erythematous along midline open to air, non smelly, (+)ostomy pink viable- thick bilious liquid stool like material, flatus present.              MSK: WWP no edema noted in upper nor lower extrem.  Rt PICC dressing c/d/I   	Neuro: No focal deficits, intact sensation, weakened strength  	Psych: Normal affect and mood  	Skin: Warm, without rashes, good turgor    .  .  .  .

## 2019-12-29 NOTE — PROGRESS NOTE ADULT - SUBJECTIVE AND OBJECTIVE BOX
HISTORY  74y Male presented 12/6 with abdominal pain, nausea, hematemesis, and poor PO intake for ~2-3 days. Labs significant for an CHI w/ Cr 2.74 and lactate of 9.4. He was also notably tachycardic to the 110s and hypotensive w/ SBP in the 70s. He was given 2 units of PRBCs and 2 L of LR in the ED due to concern for upper GI bleeding. Imaging revealed high grade SBO in the RLQ. Patient was taken to the OR emergently for an exploratory laparotomy, lysis of adhesions, decompression of bowel via enterotomy w/ primary repair, and Abthera VAC placement. Of note, the distal 50% of the bowel appeared dusky but viable. He required vasopressor support with phenylephrine and vasopressin infusions. He received 3000 mL of crystalloid w/ EBL of 10 mL and UOP of 25 mL. Patient was left intubated at the end of the case so SICU was consulted for hemodynamic monitoring. Taken back to the OR on 12/8 and underwent take down of Abthera, washout and re-application of the Abthera vac. Went back to OR on 12/10 night for colectomy. Post-op course complicated by short gut syndrome      24 HOUR EVENTS:  - Restarted Ancef x 5 days for erythematous midline wound   - 500ml of fluid administered as a bolus for hypotension to 80s to good effect.     SUBJECTIVE/ROS:  [ ] A ten-point review of systems was otherwise negative except as noted.  [ ] Due to altered mental status/intubation, subjective information were not able to be obtained from the patient. History was obtained, to the extent possible, from review of the chart and collateral sources of information.      NEURO  Exam: awake, alert, oriented  Meds: ondansetron Injectable 4 milliGRAM(s) IV Push every 6 hours PRN Nausea and/or Vomiting  opium Tincture 6 milliGRAM(s) Oral <User Schedule>    [x] Adequacy of sedation and pain control has been assessed and adjusted      RESPIRATORY  RR: 26 (12-29-19 @ 00:00) (21 - 38)  SpO2: 96% (12-29-19 @ 00:37) (90% - 100%)  Exam: unlabored, clear to auscultation bilaterally  Mechanical Ventilation: none  [N/A] Extubation Readiness Assessed  Meds: albuterol/ipratropium for Nebulization 3 milliLiter(s) Nebulizer every 6 hours  buDESOnide    Inhalation Suspension 0.5 milliGRAM(s) Inhalation every 12 hours        CARDIOVASCULAR  HR: 99 (12-29-19 @ 00:37) (80 - 103)  BP: 148/72 (12-29-19 @ 00:00) (82/47 - 148/72)  BP(mean): 102 (12-29-19 @ 00:00) (59 - 102)  Exam: regular rate and rhythm  Cardiac Rhythm: sinus  Perfusion     [x]Adequate   [ ]Inadequate  Mentation   [x]Normal       [ ]Reduced  Extremities  [x]Warm         [ ]Cool  Volume Status [ ]Hypervolemic [x]Euvolemic [ ]Hypovolemic  Meds: metoprolol tartrate 12.5 milliGRAM(s) Oral every 6 hours        GI/NUTRITION  Exam: soft, nontender, nondistended  Diet: dsyphagia diet   Meds: diphenoxylate/atropine 2 Tablet(s) Oral <User Schedule>  loperamide 16 milliGRAM(s) Oral <User Schedule>  pantoprazole  Injectable 40 milliGRAM(s) IV Push daily      GENITOURINARY  I&O's Detail    12-27 @ 07:01  -  12-28 @ 07:00  --------------------------------------------------------  IN:    fat emulsion (Fish Oil and Plant Based) 20% Infusion: 249.6 mL    Solution: 50 mL    TPN (Total Parenteral Nutrition): 2499 mL  Total IN: 2798.6 mL    OUT:    Ileostomy: 3050 mL    Incontinent per Condom Catheter: 1200 mL    Voided: 1400 mL  Total OUT: 5650 mL    Total NET: -2851.4 mL      12-28 @ 07:01  -  12-29 @ 01:04  --------------------------------------------------------  IN:    fat emulsion (Fish Oil and Plant Based) 20% Infusion: 187.2 mL    Solution: 600 mL    TPN (Total Parenteral Nutrition): 1700 mL  Total IN: 2487.2 mL    OUT:    Ileostomy: 2150 mL    Voided: 1600 mL  Total OUT: 3750 mL    Total NET: -1262.8 mL          12-29    131<L>  |  96  |  16  ----------------------------<  115<H>  4.1   |  26  |  0.47<L>    Ca    7.7<L>      29 Dec 2019 00:00  Phos  3.3     12-29  Mg     1.8     12-29    TPro  5.8<L>  /  Alb  2.2<L>  /  TBili  0.4  /  DBili  0.2  /  AST  15  /  ALT  27  /  AlkPhos  115  12-29    [ ] Martinez catheter, indication: N/A  Meds: fat emulsion (Fish Oil and Plant Based) 20% Infusion 20.8 mL/Hr IV Continuous <Continuous>  magnesium sulfate  IVPB 2 Gram(s) IV Intermittent once  Parenteral Nutrition - Adult 1 Each TPN Continuous <Continuous>        HEMATOLOGIC  Meds: enoxaparin Injectable 40 milliGRAM(s) SubCutaneous daily    [x] VTE Prophylaxis                        8.3    14.66 )-----------( 384      ( 29 Dec 2019 00:00 )             26.3       Transfusion     [ ] PRBC   [ ] Platelets   [ ] FFP   [ ] Cryoprecipitate      INFECTIOUS DISEASES  WBC Count: 14.66 K/uL (12-29 @ 00:00)    RECENT CULTURES:    Meds: acyclovir   Oral Tab/Cap 400 milliGRAM(s) Oral three times a day  ceFAZolin   IVPB      ceFAZolin   IVPB 1000 milliGRAM(s) IV Intermittent every 8 hours        ENDOCRINE  CAPILLARY BLOOD GLUCOSE      POCT Blood Glucose.: 113 mg/dL (28 Dec 2019 22:01)  POCT Blood Glucose.: 113 mg/dL (28 Dec 2019 16:44)  POCT Blood Glucose.: 118 mg/dL (28 Dec 2019 11:52)  POCT Blood Glucose.: 85 mg/dL (28 Dec 2019 06:22)    Meds: insulin lispro (HumaLOG) corrective regimen sliding scale   SubCutaneous three times a day before meals  insulin lispro (HumaLOG) corrective regimen sliding scale   SubCutaneous at bedtime  octreotide  Injectable 100 MICROGram(s) SubCutaneous three times a day        ACCESS DEVICES:  [x] Peripheral IV  [ ] Central Venous Line	[ ] R	[ ] L	[ ] IJ	[ ] Fem	[ ] SC	Placed:   [ ] Arterial Line		[ ] R	[ ] L	[ ] Fem	[ ] Rad	[ ] Ax	Placed:   [ ] PICC:					[ ] Mediport  [ ] Urinary Catheter, Date Placed:   [x] Necessity of urinary, arterial, and venous catheters discussed    OTHER MEDICATIONS:  chlorhexidine 2% Cloths 1 Application(s) Topical <User Schedule>      CODE STATUS: full code HISTORY  74y Male presented 12/6 with abdominal pain, nausea, hematemesis, and poor PO intake for ~2-3 days. Labs significant for an CHI w/ Cr 2.74 and lactate of 9.4. He was also notably tachycardic to the 110s and hypotensive w/ SBP in the 70s. He was given 2 units of PRBCs and 2 L of LR in the ED due to concern for upper GI bleeding. Imaging revealed high grade SBO in the RLQ. Patient was taken to the OR emergently for an exploratory laparotomy, lysis of adhesions, decompression of bowel via enterotomy w/ primary repair, and Abthera VAC placement. Of note, the distal 50% of the bowel appeared dusky but viable. He required vasopressor support with phenylephrine and vasopressin infusions. He received 3000 mL of crystalloid w/ EBL of 10 mL and UOP of 25 mL. Patient was left intubated at the end of the case so SICU was consulted for hemodynamic monitoring. Taken back to the OR on 12/8 and underwent take down of Abthera, washout and re-application of the Abthera vac. Went back to OR on 12/10 night for colectomy. Post-op course complicated by short gut syndrome      24 HOUR EVENTS:  - Restarted Ancef x 5 days for erythematous midline wound   - 500ml of fluid administered as a bolus for hypotension to 80s to good effect.     SUBJECTIVE/ROS:  [ x] A ten-point review of systems was otherwise negative except as noted.  [ ] Due to altered mental status/intubation, subjective information were not able to be obtained from the patient. History was obtained, to the extent possible, from review of the chart and collateral sources of information.    GENERAL: no distress    NEURO  Exam: awake, alert, oriented  Meds: ondansetron Injectable 4 milliGRAM(s) IV Push every 6 hours PRN Nausea and/or Vomiting  opium Tincture 6 milliGRAM(s) Oral <User Schedule>    [x] Adequacy of sedation and pain control has been assessed and adjusted      RESPIRATORY  RR: 26 (12-29-19 @ 00:00) (21 - 38)  SpO2: 96% (12-29-19 @ 00:37) (90% - 100%)  Exam: unlabored, clear to auscultation bilaterally  Mechanical Ventilation: none  [N/A] Extubation Readiness Assessed  Meds: albuterol/ipratropium for Nebulization 3 milliLiter(s) Nebulizer every 6 hours  buDESOnide    Inhalation Suspension 0.5 milliGRAM(s) Inhalation every 12 hours        CARDIOVASCULAR  HR: 99 (12-29-19 @ 00:37) (80 - 103)  BP: 148/72 (12-29-19 @ 00:00) (82/47 - 148/72)  BP(mean): 102 (12-29-19 @ 00:00) (59 - 102)  Exam: regular rate and rhythm  Cardiac Rhythm: sinus  Perfusion     [x]Adequate   [ ]Inadequate  Mentation   [x]Normal       [ ]Reduced  Extremities  [x]Warm         [ ]Cool  Volume Status [ ]Hypervolemic [x]Euvolemic [ ]Hypovolemic  Meds: metoprolol tartrate 12.5 milliGRAM(s) Oral every 6 hours        GI/NUTRITION  Exam: soft, nontender, nondistended  Diet: dsyphagia diet   Meds: diphenoxylate/atropine 2 Tablet(s) Oral <User Schedule>  loperamide 16 milliGRAM(s) Oral <User Schedule>  pantoprazole  Injectable 40 milliGRAM(s) IV Push daily      GENITOURINARY  I&O's Detail    12-27 @ 07:01  -  12-28 @ 07:00  --------------------------------------------------------  IN:    fat emulsion (Fish Oil and Plant Based) 20% Infusion: 249.6 mL    Solution: 50 mL    TPN (Total Parenteral Nutrition): 2499 mL  Total IN: 2798.6 mL    OUT:    Ileostomy: 3050 mL    Incontinent per Condom Catheter: 1200 mL    Voided: 1400 mL  Total OUT: 5650 mL    Total NET: -2851.4 mL      12-28 @ 07:01  -  12-29 @ 01:04  --------------------------------------------------------  IN:    fat emulsion (Fish Oil and Plant Based) 20% Infusion: 187.2 mL    Solution: 600 mL    TPN (Total Parenteral Nutrition): 1700 mL  Total IN: 2487.2 mL    OUT:    Ileostomy: 2150 mL    Voided: 1600 mL  Total OUT: 3750 mL    Total NET: -1262.8 mL          12-29    131<L>  |  96  |  16  ----------------------------<  115<H>  4.1   |  26  |  0.47<L>    Ca    7.7<L>      29 Dec 2019 00:00  Phos  3.3     12-29  Mg     1.8     12-29    TPro  5.8<L>  /  Alb  2.2<L>  /  TBili  0.4  /  DBili  0.2  /  AST  15  /  ALT  27  /  AlkPhos  115  12-29    [ ] Martinez catheter, indication: N/A  Meds: fat emulsion (Fish Oil and Plant Based) 20% Infusion 20.8 mL/Hr IV Continuous <Continuous>  magnesium sulfate  IVPB 2 Gram(s) IV Intermittent once  Parenteral Nutrition - Adult 1 Each TPN Continuous <Continuous>        HEMATOLOGIC  Meds: enoxaparin Injectable 40 milliGRAM(s) SubCutaneous daily    [x] VTE Prophylaxis                        8.3    14.66 )-----------( 384      ( 29 Dec 2019 00:00 )             26.3       Transfusion     [ ] PRBC   [ ] Platelets   [ ] FFP   [ ] Cryoprecipitate      INFECTIOUS DISEASES  WBC Count: 14.66 K/uL (12-29 @ 00:00)    RECENT CULTURES:    Meds: acyclovir   Oral Tab/Cap 400 milliGRAM(s) Oral three times a day  ceFAZolin   IVPB      ceFAZolin   IVPB 1000 milliGRAM(s) IV Intermittent every 8 hours        ENDOCRINE  CAPILLARY BLOOD GLUCOSE      POCT Blood Glucose.: 113 mg/dL (28 Dec 2019 22:01)  POCT Blood Glucose.: 113 mg/dL (28 Dec 2019 16:44)  POCT Blood Glucose.: 118 mg/dL (28 Dec 2019 11:52)  POCT Blood Glucose.: 85 mg/dL (28 Dec 2019 06:22)    Meds: insulin lispro (HumaLOG) corrective regimen sliding scale   SubCutaneous three times a day before meals  insulin lispro (HumaLOG) corrective regimen sliding scale   SubCutaneous at bedtime  octreotide  Injectable 100 MICROGram(s) SubCutaneous three times a day        ACCESS DEVICES:  [x] Peripheral IV  [ ] Central Venous Line	[ ] R	[ ] L	[ ] IJ	[ ] Fem	[ ] SC	Placed:   [ ] Arterial Line		[ ] R	[ ] L	[ ] Fem	[ ] Rad	[ ] Ax	Placed:   [ ] PICC:					[ ] Mediport  [ ] Urinary Catheter, Date Placed:   [x] Necessity of urinary, arterial, and venous catheters discussed    OTHER MEDICATIONS:  chlorhexidine 2% Cloths 1 Application(s) Topical <User Schedule>      CODE STATUS: full code

## 2019-12-29 NOTE — PROGRESS NOTE ADULT - ATTENDING COMMENTS
Patient seen and examined  He states he feels better, states his breathing has slightly improved  He denies any nausea or vomiting  Denies any abdominal pain    On exam: awake, alert and oriented  Breathing comfortably on room air, O2 sat 93%  Abd is soft, not tender and not distended  Some exudate, and scant erythema, no fluctuance  Ileostomy is pink, viable and 2550mL of output over 24 hours    - Appreciate continued SICU care  - Continue TPN  - Continue loperamide, Lomotil, tincture of opium, and octreotide  - CT chest/abd/pelvis

## 2019-12-29 NOTE — PROGRESS NOTE ADULT - ATTENDING COMMENTS
Pt seen and examined with residents and PA on 12/29, agree with above.    1. High-output ileostomy due to short gut syndrome:  - Continue lomotil, imodium, and tincture of opium: increased dose of lomotil and imodium (usual dose parameters can be exceeded per expert guidelines in short gut syndrome for lomotil and imodium)  - Continue TPN  - PPI to decrease gastric secretions  - Octreotide started, may take 5-7 days to see effect  - Pt has had a few episodes of hypotension which resolved with fluids, so will restart fluid replacements (1/2 cc/ cc ileostomy loss)    2. L PTX s/p pleural pigtail catheter, air leak resolved, now with L pleural effusion with ongoing LLL consolidation on my personal review of today's CXR:  - IR for drainage on Monday    3. Oral lesions:  - Acyclovir for possible HSV for 10 days    4. Mild hyponatremia:  - Resolved and recurred  - Likely hypovolemic hyponatremia or SIADH  - Urine output remains high, so favor SIADH    5. Dysphagia:   - Follow up FEES on Monday    6. Mild cellulitis around abdominal wound and increasing leukocytosis:  - Ancef for five days  - CT c/a/p  - Pt is allergic to IV contrast, so will give contrast allergy protocol since rim enhancement of any fluid collections may determine management    - D/w Dr. Smith Pt seen and examined with residents and PA on 12/29, agree with above.    1. High-output ileostomy due to short gut syndrome:  - Continue lomotil, imodium, and tincture of opium: increased dose of lomotil and imodium (usual dose parameters can be exceeded per expert guidelines in short gut syndrome for lomotil and imodium)  - Continue TPN  - PPI to decrease gastric secretions  - Octreotide started, may take 5-7 days to see effect  - Pt has had a few episodes of hypotension which resolved with fluids, so will restart fluid replacements (1/2 cc/ cc ileostomy loss)    2. L PTX s/p pleural pigtail catheter, air leak resolved, now with L pleural effusion with ongoing LLL consolidation on my personal review of today's CXR:  - IR for drainage on Monday    3. Oral lesions:  - Acyclovir for possible HSV for 10 days    4. Mild hyponatremia:  - Resolved and recurred  - Likely hypovolemic hyponatremia  - Pt has had hypotensive episodes despite high urine output, responsive to fluid boluses, so doubt hypervolemic or euvolemic hyponatremia  - Urine output remains high, suggestive of a renal cause    5. Dysphagia:   - Follow up FEES on Monday    6. Mild cellulitis around abdominal wound and increasing leukocytosis:  - Ancef for five days  - CT c/a/p  - Pt is allergic to IV contrast, so will give contrast allergy protocol since rim enhancement of any fluid collections may determine management    - D/w Dr. Smith

## 2019-12-29 NOTE — PROGRESS NOTE ADULT - ASSESSMENT
74 year old male w/ PMH of COPD and EtOH dependence with PSH/o appy, prostate surgery, & spine surgery  septic shock and high grade SBO s/p exploratory laparotomy, lysis of adhesions, decompression of bowel via enterotomy w/ primary repair, and Abthera VAC placement on 12/6; s/p take down of Abthera, washout and re-application of the Abthera vac on 12/8. Now s/p SBR and end ileostomy on 12/10.   TPN consulted to assist w/ management of pt's nutrition in pt w/ prolonged hospital course now tolerating diet but has high Ileostomy output.    - Continue TPN at  goal in 2.4L in pt w/ severe Protein-Calorie Malnutrition w/ high ostomy output. Pt started on Dysphagia 1 Pureed-Nectar Consistency Fluid and tolerating  - HypoNa & Elevated Bicarb- improving & will continue to monitor-  NaCl @ 80mEq  - Hyperkalemia - improving w/ decreased KCl at 60 mEq KCl in TPN, possibly due to Octreotide  - HypoPhos resolving, now with rising Phos-  improving - with decrease of NaPHos to 45mMol   - Strict Intake and Output- high ileostomy output -as per SICU to replete prn - pt started on Octreotide - will take 5-7d to see full benefit. Pt now on increased lomotil, imodium, tincture of opium, & Octreotide   - Consider sending ostomy output for electrolyte evaluation for fecal fat testing to see what pt is absorbing  - Hyperglycemia - improved - Insulin removed from TPN. Fingersticks & ISS coverage - to be ordered by primary team  - For IR drainage MOnday 12/30 of Loculated Lt chest pleural effusion  PICC w/dedicated port for only TPN - maintenance as per protocol  - Weights three times a week  - Monitor BMP, Mg, Ionized Ca, Phosphorus daily  - Continue to monitor Triglycerides weekly and Pre-albumin weekly.    Continue as per SICU / Surgery, will follow with you, D/w primary team  TPN team, pager [phone]  D/w Ana M Siegel 74 year old male w/ PMH of COPD and EtOH dependence with PSH/o appy, prostate surgery, & spine surgery  septic shock and high grade SBO s/p exploratory laparotomy, lysis of adhesions, decompression of bowel via enterotomy w/ primary repair, and Abthera VAC placement on 12/6; s/p take down of Abthera, washout and re-application of the Abthera vac on 12/8. Now s/p SBR and end ileostomy on 12/10.   TPN consulted to assist w/ management of pt's nutrition in pt w/ prolonged hospital course now tolerating diet but has high Ileostomy output.    - Continue TPN at  goal in 2.4L in pt w/ severe Protein-Calorie Malnutrition w/ high ostomy output. Pt started on Dysphagia 1 Pureed-Nectar Consistency Fluid and tolerating  - HypoNa & Elevated Bicarb- improving & will continue to monitor-  NaCl @ 80mEq  - Hyperkalemia - improving w/ decreased KCl at 60 mEq KCl in TPN, possibly due to Octreotide  - HypoPhos resolving, now with rising Phos-  improving - with decrease of NaPHos to 45mMol   - Strict Intake and Output- high ileostomy output -as per SICU to replete prn - pt started on Octreotide - will take 5-7d to see full benefit. Pt now on increased lomotil, imodium, tincture of opium, & Octreotide   - Consider sending ostomy output for electrolyte evaluation for fecal fat testing to see what pt is absorbing, asked SICU team to order fecal electrolytes content, and fat content.  - Hyperglycemia - improved - Insulin removed from TPN. Fingersticks & ISS coverage - to be ordered by primary team  - WBC elevated, continue to trend, pt afebrile, pt on Ancef for midline incision around erythema and exudate.  - For IR drainage Monday 12/30 of Loculated Lt chest pleural effusion  PICC w/dedicated port for only TPN - maintenance as per protocol  - Weights three times a week  - Monitor BMP, Mg, Ionized Ca, Phosphorus daily  - Continue to monitor Triglycerides weekly and Pre-albumin weekly.    Continue as per SICU / Surgery, will follow with you, D/w primary team    TPN team, pager [phone]  D/w Ana M Siegel 74 year old male w/ PMH of COPD and EtOH dependence with PSH/o appy, prostate surgery, & spine surgery  septic shock and high grade SBO s/p exploratory laparotomy, lysis of adhesions, decompression of bowel via enterotomy w/ primary repair, and Abthera VAC placement on 12/6; s/p take down of Abthera, washout and re-application of the Abthera vac on 12/8. Now s/p SBR and end ileostomy on 12/10.   TPN consulted to assist w/ management of pt's nutrition in pt w/ prolonged hospital course now tolerating diet but continues to have sigmificant Ileostomy output.    - Continue TPN at  goal in 1.8 in pt w/ severe Protein-Calorie Malnutrition w/ high ostomy output. Pt started on Dysphagia 1 Pureed-Nectar Consistency Fluid and tolerating  - HypoNa, fluid restricted to 1.8l checked with pharmacy. total [Na] kept the same at 200  - Hyperkalemia - improving w/ decreased KCl at 60 mEq KCl in TPN, possibly due to Octreotide  - HypoPhos improving- with NaPHos to 45mMol   - Strict Intake and Output- high ileostomy output -as per SICU to replete prn, and dose adjust Octreotide, lomotil, imodium, and tincture of opium.  - Requested of SICU team to send ostomy output for electrolyte evaluation for fecal fat testing to see what pt is absorbing, fecal electrolytes content, and fat content.  - Hyperglycemia -insulin put back in TPN bag at 5U. May also be related to recent possible wound infection.  - WBC elevated, continue to trend, pt afebrile, pt placed on Ancef yesterday for midline incision around erythema and exudate.  - For IR drainage Monday 12/30 of Loculated Lt chest pleural effusion  - PICC w/dedicated port for only TPN - maintenance as per protocol  - Weights three times a week  - Monitor BMP, Mg, Ionized Ca, Phosphorus daily  - Continue to monitor Triglycerides weekly and Pre-albumin weekly.    Continue as per SICU / Surgery, will follow with you, D/w primary team    TPN team, pager [phone]  D/w Ana M Siegel

## 2019-12-29 NOTE — PROGRESS NOTE ADULT - SUBJECTIVE AND OBJECTIVE BOX
Subjective: Patient seen and examined. No new events except as noted.   remains in ICU   no cp or sob   was hypotensive s/p IV bolus with good response  started on IV abx for arm cellulitis     REVIEW OF SYSTEMS:    CONSTITUTIONAL:+ weakness, fevers or chills  EYES/ENT: No visual changes;  No vertigo or throat pain   NECK: No pain or stiffness  RESPIRATORY: No cough, wheezing, hemoptysis; No shortness of breath  CARDIOVASCULAR: No chest pain or palpitations  GASTROINTESTINAL: No abdominal or epigastric pain. No nausea, vomiting, or hematemesis; No diarrhea or constipation. No melena or hematochezia.  GENITOURINARY: No dysuria, frequency or hematuria  NEUROLOGICAL: No numbness or weakness  SKIN: No itching, burning, rashes, or lesions   All other review of systems is negative unless indicated above.    MEDICATIONS:  MEDICATIONS  (STANDING):  acyclovir   Oral Tab/Cap 400 milliGRAM(s) Oral three times a day  albuterol/ipratropium for Nebulization 3 milliLiter(s) Nebulizer every 6 hours  buDESOnide    Inhalation Suspension 0.5 milliGRAM(s) Inhalation every 12 hours  ceFAZolin   IVPB      ceFAZolin   IVPB 1000 milliGRAM(s) IV Intermittent every 8 hours  chlorhexidine 2% Cloths 1 Application(s) Topical <User Schedule>  diphenoxylate/atropine 2 Tablet(s) Oral <User Schedule>  enoxaparin Injectable 40 milliGRAM(s) SubCutaneous daily  fat emulsion (Fish Oil and Plant Based) 20% Infusion 20.8 mL/Hr (20.8 mL/Hr) IV Continuous <Continuous>  insulin lispro (HumaLOG) corrective regimen sliding scale   SubCutaneous three times a day before meals  insulin lispro (HumaLOG) corrective regimen sliding scale   SubCutaneous at bedtime  loperamide 16 milliGRAM(s) Oral <User Schedule>  octreotide  Injectable 100 MICROGram(s) SubCutaneous three times a day  opium Tincture 6 milliGRAM(s) Oral <User Schedule>  pantoprazole  Injectable 40 milliGRAM(s) IV Push daily  Parenteral Nutrition - Adult 1 Each (100 mL/Hr) TPN Continuous <Continuous>  Parenteral Nutrition - Adult 1 Each (75 mL/Hr) TPN Continuous <Continuous>  sodium chloride 0.9%. 1000 milliLiter(s) (10 mL/Hr) IV Continuous <Continuous>      PHYSICAL EXAM:  T(C): 37.1 (12-29-19 @ 07:00), Max: 37.1 (12-29-19 @ 07:00)  HR: 90 (12-29-19 @ 11:13) (81 - 108)  BP: 82/52 (12-29-19 @ 11:00) (82/47 - 148/72)  RR: 22 (12-29-19 @ 11:00) (22 - 40)  SpO2: 96% (12-29-19 @ 11:13) (79% - 100%)  Wt(kg): --  I&O's Summary    28 Dec 2019 07:01  -  29 Dec 2019 07:00  --------------------------------------------------------  IN: 3941.2 mL / OUT: 4850 mL / NET: -908.8 mL    29 Dec 2019 07:01  -  29 Dec 2019 11:51  --------------------------------------------------------  IN: 400 mL / OUT: 0 mL / NET: 400 mL            Appearance: NAD   HEENT:   Dry oral mucosa, crusted lips   Lymphatic: No lymphadenopathy   Cardiovascular: Normal S1 S2, No JVD, No murmurs , Peripheral pulses palpable 2+ bilaterally  Respiratory: Decreased bs   Gastrointestinal:  Soft, NT, ND. Ostomy pink with output. Midline staples in place, midline incision site is c/d/i   Skin: No rashes, No ecchymoses, No cyanosis, warm to touch  Musculoskeletal: Decreased  range of motion and strength  Psychiatry:  mildly sedated   Ext: No edema +PICC line   +rosas   +rectal tube       LABS:    CARDIAC MARKERS:                                8.3    14.66 )-----------( 384      ( 29 Dec 2019 00:00 )             26.3     12-29    131<L>  |  96  |  16  ----------------------------<  115<H>  4.1   |  26  |  0.47<L>    Ca    7.7<L>      29 Dec 2019 00:00  Phos  3.3     12-29  Mg     1.8     12-29    TPro  5.8<L>  /  Alb  2.2<L>  /  TBili  0.4  /  DBili  0.2  /  AST  15  /  ALT  27  /  AlkPhos  115  12-29    proBNP:   Lipid Profile:   HgA1c:   TSH:             TELEMETRY: SR	    ECG:  	  RADIOLOGY:   < from: Xray Chest 1 View- PORTABLE-Routine (12.28.19 @ 07:17) >    EXAM:  XR CHEST PORTABLE ROUTINE 1V                            PROCEDURE DATE:  12/28/2019            INTERPRETATION:  CLINICAL INFORMATION: Evaluate left pleural effusion    COMPARISON:  Chest radiograph from 12/27/2019    TECHNIQUE:   Frontal view of the chest    FINDINGS:     Right PICC line with tip in the SVC. The heart is normal in size. Unchanged small left pleural effusion. Unchanged trace right pleural effusion.      IMPRESSION:  Unchanged small left pleural effusion and unchanged trace right pleural effusion.                HELENA KOCH M.D., RADIOLOGY RESIDENT  This document has been electronically signed.  MARGOTH BOOGIE M.D., ATTENDING RADIOLOGIST  This document has been electronically signed. Dec 28 2019 12:59PM                < end of copied text >    DIAGNOSTIC TESTING:  [ ] Echocardiogram:  [ ]  Catheterization:  [ ] Stress Test:    OTHER:

## 2019-12-30 ENCOUNTER — RESULT REVIEW (OUTPATIENT)
Age: 74
End: 2019-12-30

## 2019-12-30 LAB
ALBUMIN FLD-MCNC: 1.1 G/DL — SIGNIFICANT CHANGE UP
ALBUMIN SERPL ELPH-MCNC: 2.2 G/DL — LOW (ref 3.3–5)
ALBUMIN SERPL ELPH-MCNC: 2.4 G/DL — LOW (ref 3.3–5)
ALP SERPL-CCNC: 100 U/L — SIGNIFICANT CHANGE UP (ref 40–120)
ALP SERPL-CCNC: 109 U/L — SIGNIFICANT CHANGE UP (ref 40–120)
ALT FLD-CCNC: 17 U/L — SIGNIFICANT CHANGE UP (ref 10–45)
ALT FLD-CCNC: 20 U/L — SIGNIFICANT CHANGE UP (ref 10–45)
ANION GAP SERPL CALC-SCNC: 7 MMOL/L — SIGNIFICANT CHANGE UP (ref 5–17)
ANION GAP SERPL CALC-SCNC: 8 MMOL/L — SIGNIFICANT CHANGE UP (ref 5–17)
AST SERPL-CCNC: 15 U/L — SIGNIFICANT CHANGE UP (ref 10–40)
AST SERPL-CCNC: 15 U/L — SIGNIFICANT CHANGE UP (ref 10–40)
B PERT IGG+IGM PNL SER: ABNORMAL
BILIRUB DIRECT SERPL-MCNC: 0.2 MG/DL — SIGNIFICANT CHANGE UP (ref 0–0.2)
BILIRUB DIRECT SERPL-MCNC: 0.2 MG/DL — SIGNIFICANT CHANGE UP (ref 0–0.2)
BILIRUB INDIRECT FLD-MCNC: 0.2 MG/DL — SIGNIFICANT CHANGE UP (ref 0.2–1)
BILIRUB INDIRECT FLD-MCNC: 0.2 MG/DL — SIGNIFICANT CHANGE UP (ref 0.2–1)
BILIRUB SERPL-MCNC: 0.4 MG/DL — SIGNIFICANT CHANGE UP (ref 0.2–1.2)
BILIRUB SERPL-MCNC: 0.4 MG/DL — SIGNIFICANT CHANGE UP (ref 0.2–1.2)
BLD GP AB SCN SERPL QL: NEGATIVE — SIGNIFICANT CHANGE UP
BUN SERPL-MCNC: 17 MG/DL — SIGNIFICANT CHANGE UP (ref 7–23)
BUN SERPL-MCNC: 22 MG/DL — SIGNIFICANT CHANGE UP (ref 7–23)
CALCIUM SERPL-MCNC: 7.7 MG/DL — LOW (ref 8.4–10.5)
CALCIUM SERPL-MCNC: 8.1 MG/DL — LOW (ref 8.4–10.5)
CHLORIDE SERPL-SCNC: 97 MMOL/L — SIGNIFICANT CHANGE UP (ref 96–108)
CHLORIDE SERPL-SCNC: 98 MMOL/L — SIGNIFICANT CHANGE UP (ref 96–108)
CO2 SERPL-SCNC: 28 MMOL/L — SIGNIFICANT CHANGE UP (ref 22–31)
CO2 SERPL-SCNC: 30 MMOL/L — SIGNIFICANT CHANGE UP (ref 22–31)
COLOR FLD: SIGNIFICANT CHANGE UP
CREAT SERPL-MCNC: 0.45 MG/DL — LOW (ref 0.5–1.3)
CREAT SERPL-MCNC: 0.48 MG/DL — LOW (ref 0.5–1.3)
FLUID INTAKE SUBSTANCE CLASS: SIGNIFICANT CHANGE UP
FLUID SEGMENTED GRANULOCYTES: 32 % — SIGNIFICANT CHANGE UP
GLUCOSE BLDC GLUCOMTR-MCNC: 139 MG/DL — HIGH (ref 70–99)
GLUCOSE BLDC GLUCOMTR-MCNC: 145 MG/DL — HIGH (ref 70–99)
GLUCOSE BLDC GLUCOMTR-MCNC: 146 MG/DL — HIGH (ref 70–99)
GLUCOSE BLDC GLUCOMTR-MCNC: 185 MG/DL — HIGH (ref 70–99)
GLUCOSE FLD-MCNC: 148 MG/DL — SIGNIFICANT CHANGE UP
GLUCOSE SERPL-MCNC: 135 MG/DL — HIGH (ref 70–99)
GLUCOSE SERPL-MCNC: 162 MG/DL — HIGH (ref 70–99)
GRAM STN FLD: SIGNIFICANT CHANGE UP
HCT VFR BLD CALC: 23.7 % — LOW (ref 39–50)
HCT VFR BLD CALC: 25.3 % — LOW (ref 39–50)
HGB BLD-MCNC: 7.5 G/DL — LOW (ref 13–17)
HGB BLD-MCNC: 8.1 G/DL — LOW (ref 13–17)
LDH SERPL L TO P-CCNC: 167 U/L — SIGNIFICANT CHANGE UP
LYMPHOCYTES # FLD: 54 % — SIGNIFICANT CHANGE UP
MAGNESIUM SERPL-MCNC: 2 MG/DL — SIGNIFICANT CHANGE UP (ref 1.6–2.6)
MAGNESIUM SERPL-MCNC: 2 MG/DL — SIGNIFICANT CHANGE UP (ref 1.6–2.6)
MCHC RBC-ENTMCNC: 30.1 PG — SIGNIFICANT CHANGE UP (ref 27–34)
MCHC RBC-ENTMCNC: 30.1 PG — SIGNIFICANT CHANGE UP (ref 27–34)
MCHC RBC-ENTMCNC: 31.6 GM/DL — LOW (ref 32–36)
MCHC RBC-ENTMCNC: 32 GM/DL — SIGNIFICANT CHANGE UP (ref 32–36)
MCV RBC AUTO: 94.1 FL — SIGNIFICANT CHANGE UP (ref 80–100)
MCV RBC AUTO: 95.2 FL — SIGNIFICANT CHANGE UP (ref 80–100)
MESOTHL CELL # FLD: 7 % — SIGNIFICANT CHANGE UP
MONOS+MACROS # FLD: 7 % — SIGNIFICANT CHANGE UP
NRBC # BLD: 0 /100 WBCS — SIGNIFICANT CHANGE UP (ref 0–0)
NRBC # BLD: 0 /100 WBCS — SIGNIFICANT CHANGE UP (ref 0–0)
PH FLD: 7.86 — SIGNIFICANT CHANGE UP
PHOSPHATE SERPL-MCNC: 3.7 MG/DL — SIGNIFICANT CHANGE UP (ref 2.5–4.5)
PHOSPHATE SERPL-MCNC: 4 MG/DL — SIGNIFICANT CHANGE UP (ref 2.5–4.5)
PLATELET # BLD AUTO: 365 K/UL — SIGNIFICANT CHANGE UP (ref 150–400)
PLATELET # BLD AUTO: 366 K/UL — SIGNIFICANT CHANGE UP (ref 150–400)
POTASSIUM SERPL-MCNC: 4.7 MMOL/L — SIGNIFICANT CHANGE UP (ref 3.5–5.3)
POTASSIUM SERPL-MCNC: 5.1 MMOL/L — SIGNIFICANT CHANGE UP (ref 3.5–5.3)
POTASSIUM SERPL-SCNC: 4.7 MMOL/L — SIGNIFICANT CHANGE UP (ref 3.5–5.3)
POTASSIUM SERPL-SCNC: 5.1 MMOL/L — SIGNIFICANT CHANGE UP (ref 3.5–5.3)
PREALB SERPL-MCNC: 16 MG/DL — LOW (ref 20–40)
PROT FLD-MCNC: 2.7 G/DL — SIGNIFICANT CHANGE UP
PROT SERPL-MCNC: 5.7 G/DL — LOW (ref 6–8.3)
PROT SERPL-MCNC: 6 G/DL — SIGNIFICANT CHANGE UP (ref 6–8.3)
RBC # BLD: 2.49 M/UL — LOW (ref 4.2–5.8)
RBC # BLD: 2.69 M/UL — LOW (ref 4.2–5.8)
RBC # FLD: 13.8 % — SIGNIFICANT CHANGE UP (ref 10.3–14.5)
RBC # FLD: 13.8 % — SIGNIFICANT CHANGE UP (ref 10.3–14.5)
RCV VOL RI: 1800 /UL — HIGH (ref 0–0)
RH IG SCN BLD-IMP: POSITIVE — SIGNIFICANT CHANGE UP
SODIUM SERPL-SCNC: 134 MMOL/L — LOW (ref 135–145)
SODIUM SERPL-SCNC: 134 MMOL/L — LOW (ref 135–145)
SPECIMEN SOURCE FLD: SIGNIFICANT CHANGE UP
SPECIMEN SOURCE FLD: SIGNIFICANT CHANGE UP
SPECIMEN SOURCE: SIGNIFICANT CHANGE UP
TOTAL NUCLEATED CELL COUNT, BODY FLUID: 340 /UL — SIGNIFICANT CHANGE UP
TRIGL SERPL-MCNC: 51 MG/DL — SIGNIFICANT CHANGE UP (ref 10–149)
TROPONIN T, HIGH SENSITIVITY RESULT: 63 NG/L — HIGH (ref 0–51)
TUBE TYPE: SIGNIFICANT CHANGE UP
WBC # BLD: 16.53 K/UL — HIGH (ref 3.8–10.5)
WBC # BLD: 17.4 K/UL — HIGH (ref 3.8–10.5)
WBC # FLD AUTO: 16.53 K/UL — HIGH (ref 3.8–10.5)
WBC # FLD AUTO: 17.4 K/UL — HIGH (ref 3.8–10.5)

## 2019-12-30 PROCEDURE — 99232 SBSQ HOSP IP/OBS MODERATE 35: CPT

## 2019-12-30 PROCEDURE — 32557 INSERT CATH PLEURA W/ IMAGE: CPT | Mod: LT

## 2019-12-30 PROCEDURE — 74177 CT ABD & PELVIS W/CONTRAST: CPT | Mod: 26

## 2019-12-30 PROCEDURE — 88112 CYTOPATH CELL ENHANCE TECH: CPT | Mod: 26

## 2019-12-30 PROCEDURE — 88305 TISSUE EXAM BY PATHOLOGIST: CPT | Mod: 26

## 2019-12-30 PROCEDURE — 71260 CT THORAX DX C+: CPT | Mod: 26

## 2019-12-30 PROCEDURE — 99291 CRITICAL CARE FIRST HOUR: CPT

## 2019-12-30 PROCEDURE — 71045 X-RAY EXAM CHEST 1 VIEW: CPT | Mod: 26

## 2019-12-30 RX ORDER — SODIUM CHLORIDE 9 MG/ML
500 INJECTION INTRAMUSCULAR; INTRAVENOUS; SUBCUTANEOUS ONCE
Refills: 0 | Status: COMPLETED | OUTPATIENT
Start: 2019-12-30 | End: 2019-12-30

## 2019-12-30 RX ORDER — SODIUM CHLORIDE 9 MG/ML
250 INJECTION INTRAMUSCULAR; INTRAVENOUS; SUBCUTANEOUS ONCE
Refills: 0 | Status: DISCONTINUED | OUTPATIENT
Start: 2019-12-30 | End: 2019-12-30

## 2019-12-30 RX ORDER — I.V. FAT EMULSION 20 G/100ML
20.8 EMULSION INTRAVENOUS
Qty: 50 | Refills: 0 | Status: DISCONTINUED | OUTPATIENT
Start: 2019-12-30 | End: 2019-12-31

## 2019-12-30 RX ORDER — METOPROLOL TARTRATE 50 MG
12.5 TABLET ORAL EVERY 12 HOURS
Refills: 0 | Status: DISCONTINUED | OUTPATIENT
Start: 2019-12-30 | End: 2019-12-30

## 2019-12-30 RX ORDER — DIPHENOXYLATE HCL/ATROPINE 2.5-.025MG
2 TABLET ORAL
Refills: 0 | Status: DISCONTINUED | OUTPATIENT
Start: 2019-12-30 | End: 2020-01-05

## 2019-12-30 RX ORDER — MORPHINE 10 MG/ML
6 SOLUTION ORAL
Refills: 0 | Status: DISCONTINUED | OUTPATIENT
Start: 2019-12-30 | End: 2020-01-05

## 2019-12-30 RX ORDER — METOPROLOL TARTRATE 50 MG
12.5 TABLET ORAL ONCE
Refills: 0 | Status: COMPLETED | OUTPATIENT
Start: 2019-12-30 | End: 2019-12-30

## 2019-12-30 RX ORDER — ELECTROLYTE SOLUTION,INJ
1 VIAL (ML) INTRAVENOUS
Refills: 0 | Status: DISCONTINUED | OUTPATIENT
Start: 2019-12-30 | End: 2019-12-30

## 2019-12-30 RX ORDER — ALBUMIN HUMAN 25 %
500 VIAL (ML) INTRAVENOUS ONCE
Refills: 0 | Status: COMPLETED | OUTPATIENT
Start: 2019-12-30 | End: 2019-12-30

## 2019-12-30 RX ORDER — ACETAMINOPHEN 500 MG
650 TABLET ORAL ONCE
Refills: 0 | Status: COMPLETED | OUTPATIENT
Start: 2019-12-30 | End: 2019-12-30

## 2019-12-30 RX ADMIN — Medication 16 MILLIGRAM(S): at 08:36

## 2019-12-30 RX ADMIN — Medication 3 MILLILITER(S): at 11:11

## 2019-12-30 RX ADMIN — Medication 0.5 MILLIGRAM(S): at 17:24

## 2019-12-30 RX ADMIN — SODIUM CHLORIDE 10 MILLILITER(S): 9 INJECTION INTRAMUSCULAR; INTRAVENOUS; SUBCUTANEOUS at 18:40

## 2019-12-30 RX ADMIN — MORPHINE 6 MILLIGRAM(S): 10 SOLUTION ORAL at 08:35

## 2019-12-30 RX ADMIN — Medication 1: at 04:57

## 2019-12-30 RX ADMIN — Medication 16 MILLIGRAM(S): at 18:10

## 2019-12-30 RX ADMIN — Medication 400 MILLIGRAM(S): at 04:40

## 2019-12-30 RX ADMIN — Medication 100 MILLIGRAM(S): at 04:56

## 2019-12-30 RX ADMIN — OCTREOTIDE ACETATE 100 MICROGRAM(S): 200 INJECTION, SOLUTION INTRAVENOUS; SUBCUTANEOUS at 16:31

## 2019-12-30 RX ADMIN — SODIUM CHLORIDE 10 MILLILITER(S): 9 INJECTION INTRAMUSCULAR; INTRAVENOUS; SUBCUTANEOUS at 22:19

## 2019-12-30 RX ADMIN — Medication 16 MILLIGRAM(S): at 13:00

## 2019-12-30 RX ADMIN — Medication 400 MILLIGRAM(S): at 22:23

## 2019-12-30 RX ADMIN — Medication 2 TABLET(S): at 13:00

## 2019-12-30 RX ADMIN — Medication 50 MILLIGRAM(S): at 04:40

## 2019-12-30 RX ADMIN — Medication 1 EACH: at 19:09

## 2019-12-30 RX ADMIN — Medication 100 MILLIGRAM(S): at 16:31

## 2019-12-30 RX ADMIN — SODIUM CHLORIDE 3000 MILLILITER(S): 9 INJECTION INTRAMUSCULAR; INTRAVENOUS; SUBCUTANEOUS at 20:00

## 2019-12-30 RX ADMIN — Medication 100 MILLIGRAM(S): at 22:21

## 2019-12-30 RX ADMIN — Medication 32 MILLIGRAM(S): at 04:40

## 2019-12-30 RX ADMIN — Medication 16 MILLIGRAM(S): at 22:24

## 2019-12-30 RX ADMIN — OCTREOTIDE ACETATE 100 MICROGRAM(S): 200 INJECTION, SOLUTION INTRAVENOUS; SUBCUTANEOUS at 04:41

## 2019-12-30 RX ADMIN — Medication 650 MILLIGRAM(S): at 22:56

## 2019-12-30 RX ADMIN — Medication 2 TABLET(S): at 18:10

## 2019-12-30 RX ADMIN — I.V. FAT EMULSION 20.8 ML/HR: 20 EMULSION INTRAVENOUS at 18:10

## 2019-12-30 RX ADMIN — PANTOPRAZOLE SODIUM 40 MILLIGRAM(S): 20 TABLET, DELAYED RELEASE ORAL at 11:04

## 2019-12-30 RX ADMIN — Medication 2 TABLET(S): at 22:42

## 2019-12-30 RX ADMIN — Medication 400 MILLIGRAM(S): at 16:31

## 2019-12-30 RX ADMIN — SODIUM CHLORIDE 10 MILLILITER(S): 9 INJECTION INTRAMUSCULAR; INTRAVENOUS; SUBCUTANEOUS at 11:04

## 2019-12-30 RX ADMIN — Medication 2 TABLET(S): at 08:36

## 2019-12-30 RX ADMIN — MORPHINE 6 MILLIGRAM(S): 10 SOLUTION ORAL at 13:00

## 2019-12-30 RX ADMIN — Medication 12.5 MILLIGRAM(S): at 12:32

## 2019-12-30 RX ADMIN — OCTREOTIDE ACETATE 100 MICROGRAM(S): 200 INJECTION, SOLUTION INTRAVENOUS; SUBCUTANEOUS at 22:23

## 2019-12-30 RX ADMIN — Medication 250 MILLILITER(S): at 20:00

## 2019-12-30 RX ADMIN — CHLORHEXIDINE GLUCONATE 1 APPLICATION(S): 213 SOLUTION TOPICAL at 04:41

## 2019-12-30 RX ADMIN — SODIUM CHLORIDE 3000 MILLILITER(S): 9 INJECTION INTRAMUSCULAR; INTRAVENOUS; SUBCUTANEOUS at 18:47

## 2019-12-30 RX ADMIN — Medication 3 MILLILITER(S): at 00:24

## 2019-12-30 RX ADMIN — MORPHINE 6 MILLIGRAM(S): 10 SOLUTION ORAL at 22:42

## 2019-12-30 RX ADMIN — Medication 3 MILLILITER(S): at 17:24

## 2019-12-30 RX ADMIN — Medication 260 MILLIGRAM(S): at 22:41

## 2019-12-30 RX ADMIN — MORPHINE 6 MILLIGRAM(S): 10 SOLUTION ORAL at 18:10

## 2019-12-30 NOTE — PROGRESS NOTE ADULT - PROBLEM SELECTOR PLAN 1
12/24 left chest tube for ptx dc'd  Left  pleural effusion and PTX  for  IR to place pigtail for drainage.    Will continue to follow  Care as per primary team

## 2019-12-30 NOTE — PROGRESS NOTE ADULT - SUBJECTIVE AND OBJECTIVE BOX
HISTORY  74y Male presented 12/6 with abdominal pain, nausea, hematemesis, and poor PO intake for ~2-3 days. Labs significant for an CHI w/ Cr 2.74 and lactate of 9.4. He was also notably tachycardic to the 110s and hypotensive w/ SBP in the 70s. He was given 2 units of PRBCs and 2 L of LR in the ED due to concern for upper GI bleeding. Imaging revealed high grade SBO in the RLQ. Patient was taken to the OR emergently for an exploratory laparotomy, lysis of adhesions, decompression of bowel via enterotomy w/ primary repair, and Abthera VAC placement. Of note, the distal 50% of the bowel appeared dusky but viable. He required vasopressor support with phenylephrine and vasopressin infusions. He received 3000 mL of crystalloid w/ EBL of 10 mL and UOP of 25 mL. Patient was left intubated at the end of the case so SICU was consulted for hemodynamic monitoring. Taken back to the OR on 12/8 and underwent take down of Abthera, washout and re-application of the Abthera vac. Went back to OR on 12/10 night for colectomy. Post-op course complicated by short gut syndrome      24 HOUR EVENTS:  - Restarted 05./1 repletions     SUBJECTIVE/ROS:  [ ] A ten-point review of systems was otherwise negative except as noted.  [ ] Due to altered mental status/intubation, subjective information were not able to be obtained from the patient. History was obtained, to the extent possible, from review of the chart and collateral sources of information.      NEURO  RASS:     GCS:     CAM ICU:  Exam: awake, alert, oriented  Meds: diphenhydrAMINE 50 milliGRAM(s) Oral once  ondansetron Injectable 4 milliGRAM(s) IV Push every 6 hours PRN Nausea and/or Vomiting  opium Tincture 6 milliGRAM(s) Oral <User Schedule>    [x] Adequacy of sedation and pain control has been assessed and adjusted      RESPIRATORY  RR: 23 (12-30-19 @ 01:00) (20 - 44)  SpO2: 96% (12-30-19 @ 01:00) (79% - 99%)  Wt(kg): --  Exam: unlabored, clear to auscultation bilaterally  Mechanical Ventilation:     [N/A] Extubation Readiness Assessed  Meds: albuterol/ipratropium for Nebulization 3 milliLiter(s) Nebulizer every 6 hours  buDESOnide    Inhalation Suspension 0.5 milliGRAM(s) Inhalation every 12 hours        CARDIOVASCULAR  HR: 96 (12-30-19 @ 01:00) (81 - 108)  BP: 107/74 (12-30-19 @ 01:00) (72/46 - 158/72)  BP(mean): 84 (12-30-19 @ 01:00) (54 - 104)  ABP: --  ABP(mean): --  Wt(kg): --  CVP(cm H2O): --      Exam: regular rate and rhythm  Cardiac Rhythm: sinus  Perfusion     [x]Adequate   [ ]Inadequate  Mentation   [x]Normal       [ ]Reduced  Extremities  [x]Warm         [ ]Cool  Volume Status [ ]Hypervolemic [x]Euvolemic [ ]Hypovolemic  Meds:       GI/NUTRITION  Exam: soft, nontender, nondistended, incision C/D/I  Diet:  Meds: diphenoxylate/atropine 2 Tablet(s) Oral <User Schedule>  loperamide 16 milliGRAM(s) Oral <User Schedule>  pantoprazole  Injectable 40 milliGRAM(s) IV Push daily      GENITOURINARY  I&O's Detail    12-28 @ 07:01  -  12-29 @ 07:00  --------------------------------------------------------  IN:    fat emulsion (Fish Oil and Plant Based) 20% Infusion: 291.2 mL    Solution: 1250 mL    TPN (Total Parenteral Nutrition): 2400 mL  Total IN: 3941.2 mL    OUT:    Ileostomy: 2550 mL    Voided: 2300 mL  Total OUT: 4850 mL    Total NET: -908.8 mL      12-29 @ 07:01  -  12-30 @ 01:14  --------------------------------------------------------  IN:    fat emulsion (Fish Oil and Plant Based) 20% Infusion: 187.3 mL    sodium chloride 0.9%.: 800 mL    Solution: 50 mL    TPN (Total Parenteral Nutrition): 1650 mL  Total IN: 2687.3 mL    OUT:    Ileostomy: 1600 mL    Incontinent per Condom Catheter: 1900 mL  Total OUT: 3500 mL    Total NET: -812.7 mL          12-29    134<L>  |  97  |  16  ----------------------------<  123<H>  4.2   |  31  |  0.45<L>    Ca    7.9<L>      29 Dec 2019 12:45  Phos  4.0     12-29  Mg     2.0     12-29    TPro  5.8<L>  /  Alb  2.2<L>  /  TBili  0.4  /  DBili  0.2  /  AST  15  /  ALT  27  /  AlkPhos  115  12-29    [ ] Martinez catheter, indication: N/A  Meds: fat emulsion (Fish Oil and Plant Based) 20% Infusion 20.8 mL/Hr IV Continuous <Continuous>  Parenteral Nutrition - Adult 1 Each TPN Continuous <Continuous>  sodium chloride 0.9%. 1000 milliLiter(s) IV Continuous <Continuous>        HEMATOLOGIC  Meds:   [x] VTE Prophylaxis                        8.3    14.66 )-----------( 384      ( 29 Dec 2019 00:00 )             26.3       Transfusion     [ ] PRBC   [ ] Platelets   [ ] FFP   [ ] Cryoprecipitate      INFECTIOUS DISEASES    RECENT CULTURES:    Meds: acyclovir   Oral Tab/Cap 400 milliGRAM(s) Oral three times a day  ceFAZolin   IVPB      ceFAZolin   IVPB 1000 milliGRAM(s) IV Intermittent every 8 hours        ENDOCRINE  CAPILLARY BLOOD GLUCOSE      POCT Blood Glucose.: 127 mg/dL (29 Dec 2019 21:59)  POCT Blood Glucose.: 125 mg/dL (29 Dec 2019 17:53)  POCT Blood Glucose.: 128 mg/dL (29 Dec 2019 12:12)  POCT Blood Glucose.: 172 mg/dL (29 Dec 2019 06:27)    Meds: insulin lispro (HumaLOG) corrective regimen sliding scale   SubCutaneous three times a day before meals  insulin lispro (HumaLOG) corrective regimen sliding scale   SubCutaneous at bedtime  methylPREDNISolone 32 milliGRAM(s) Oral once  octreotide  Injectable 100 MICROGram(s) SubCutaneous three times a day        ACCESS DEVICES:  [ ] Peripheral IV  [ ] Central Venous Line	[ ] R	[ ] L	[ ] IJ	[ ] Fem	[ ] SC	Placed:   [ ] Arterial Line		[ ] R	[ ] L	[ ] Fem	[ ] Rad	[ ] Ax	Placed:   [ ] PICC:					[ ] Mediport  [ ] Urinary Catheter, Date Placed:   [x] Necessity of urinary, arterial, and venous catheters discussed    OTHER MEDICATIONS:  chlorhexidine 2% Cloths 1 Application(s) Topical <User Schedule>      CODE STATUS:      IMAGING: HISTORY  74y Male presented 12/6 with abdominal pain, nausea, hematemesis, and poor PO intake for ~2-3 days. Labs significant for an CHI w/ Cr 2.74 and lactate of 9.4. He was also notably tachycardic to the 110s and hypotensive w/ SBP in the 70s. He was given 2 units of PRBCs and 2 L of LR in the ED due to concern for upper GI bleeding. Imaging revealed high grade SBO in the RLQ. Patient was taken to the OR emergently for an exploratory laparotomy, lysis of adhesions, decompression of bowel via enterotomy w/ primary repair, and Abthera VAC placement. Of note, the distal 50% of the bowel appeared dusky but viable. He required vasopressor support with phenylephrine and vasopressin infusions. He received 3000 mL of crystalloid w/ EBL of 10 mL and UOP of 25 mL. Patient was left intubated at the end of the case so SICU was consulted for hemodynamic monitoring. Taken back to the OR on 12/8 and underwent take down of Abthera, washout and re-application of the Abthera vac. Went back to OR on 12/10 night for colectomy. Post-op course complicated by short gut syndrome      24 HOUR EVENTS:  - Restarted 05./1 repletions   - Beta blocker held      SUBJECTIVE/ROS:  [ ] A ten-point review of systems was otherwise negative except as noted.  [ ] Due to altered mental status/intubation, subjective information were not able to be obtained from the patient. History was obtained, to the extent possible, from review of the chart and collateral sources of information.      NEURO  Exam: awake, alert, oriented  Meds: diphenhydrAMINE 50 milliGRAM(s) Oral once  ondansetron Injectable 4 milliGRAM(s) IV Push every 6 hours PRN Nausea and/or Vomiting  opium Tincture 6 milliGRAM(s) Oral <User Schedule>    [x] Adequacy of sedation and pain control has been assessed and adjusted      RESPIRATORY  RR: 23 (12-30-19 @ 01:00) (20 - 44)  SpO2: 96% (12-30-19 @ 01:00) (79% - 99%)  Exam: unlabored, clear to auscultation bilaterally  Mechanical Ventilation: none  [N/A] Extubation Readiness Assessed  Meds: albuterol/ipratropium for Nebulization 3 milliLiter(s) Nebulizer every 6 hours  buDESOnide    Inhalation Suspension 0.5 milliGRAM(s) Inhalation every 12 hours        CARDIOVASCULAR  HR: 96 (12-30-19 @ 01:00) (81 - 108)  BP: 107/74 (12-30-19 @ 01:00) (72/46 - 158/72)  BP(mean): 84 (12-30-19 @ 01:00) (54 - 104)  Exam: regular rate and rhythm  Cardiac Rhythm: sinus  Perfusion     [x]Adequate   [ ]Inadequate  Mentation   [x]Normal       [ ]Reduced  Extremities  [x]Warm         [ ]Cool  Volume Status [ ]Hypervolemic [x]Euvolemic [ ]Hypovolemic  Meds: none       GI/NUTRITION  Exam: soft, nontender, nondistended  Diet: Dsyphagia diet   Meds: diphenoxylate/atropine 2 Tablet(s) Oral <User Schedule>  loperamide 16 milliGRAM(s) Oral <User Schedule>  pantoprazole  Injectable 40 milliGRAM(s) IV Push daily      GENITOURINARY  I&O's Detail    12-28 @ 07:01  -  12-29 @ 07:00  --------------------------------------------------------  IN:    fat emulsion (Fish Oil and Plant Based) 20% Infusion: 291.2 mL    Solution: 1250 mL    TPN (Total Parenteral Nutrition): 2400 mL  Total IN: 3941.2 mL    OUT:    Ileostomy: 2550 mL    Voided: 2300 mL  Total OUT: 4850 mL    Total NET: -908.8 mL      12-29 @ 07:01  -  12-30 @ 01:14  --------------------------------------------------------  IN:    fat emulsion (Fish Oil and Plant Based) 20% Infusion: 187.3 mL    sodium chloride 0.9%.: 800 mL    Solution: 50 mL    TPN (Total Parenteral Nutrition): 1650 mL  Total IN: 2687.3 mL    OUT:    Ileostomy: 1600 mL    Incontinent per Condom Catheter: 1900 mL  Total OUT: 3500 mL    Total NET: -812.7 mL          12-29    134<L>  |  97  |  16  ----------------------------<  123<H>  4.2   |  31  |  0.45<L>    Ca    7.9<L>      29 Dec 2019 12:45  Phos  4.0     12-29  Mg     2.0     12-29    TPro  5.8<L>  /  Alb  2.2<L>  /  TBili  0.4  /  DBili  0.2  /  AST  15  /  ALT  27  /  AlkPhos  115  12-29    [ ] Martinez catheter, indication: none   Meds: fat emulsion (Fish Oil and Plant Based) 20% Infusion 20.8 mL/Hr IV Continuous <Continuous>  Parenteral Nutrition - Adult 1 Each TPN Continuous <Continuous>  sodium chloride 0.9%. 1000 milliLiter(s) IV Continuous <Continuous>        HEMATOLOGIC  Meds:   [x] VTE Prophylaxis                        8.3    14.66 )-----------( 384      ( 29 Dec 2019 00:00 )             26.3       Transfusion     [ ] PRBC   [ ] Platelets   [ ] FFP   [ ] Cryoprecipitate      INFECTIOUS DISEASES    RECENT CULTURES:    Meds: acyclovir   Oral Tab/Cap 400 milliGRAM(s) Oral three times a day  ceFAZolin   IVPB      ceFAZolin   IVPB 1000 milliGRAM(s) IV Intermittent every 8 hours        ENDOCRINE  CAPILLARY BLOOD GLUCOSE      POCT Blood Glucose.: 127 mg/dL (29 Dec 2019 21:59)  POCT Blood Glucose.: 125 mg/dL (29 Dec 2019 17:53)  POCT Blood Glucose.: 128 mg/dL (29 Dec 2019 12:12)  POCT Blood Glucose.: 172 mg/dL (29 Dec 2019 06:27)    Meds: insulin lispro (HumaLOG) corrective regimen sliding scale   SubCutaneous three times a day before meals  insulin lispro (HumaLOG) corrective regimen sliding scale   SubCutaneous at bedtime  methylPREDNISolone 32 milliGRAM(s) Oral once  octreotide  Injectable 100 MICROGram(s) SubCutaneous three times a day        ACCESS DEVICES:  [x] Peripheral IV  [ ] Central Venous Line	[ ] R	[ ] L	[ ] IJ	[ ] Fem	[ ] SC	Placed:   [ ] Arterial Line		[ ] R	[ ] L	[ ] Fem	[ ] Rad	[ ] Ax	Placed:   [ ] PICC:					[ ] Mediport  [ ] Urinary Catheter, Date Placed:   [x] Necessity of urinary, arterial, and venous catheters discussed    OTHER MEDICATIONS:  chlorhexidine 2% Cloths 1 Application(s) Topical <User Schedule>      CODE STATUS: full code       IMAGING: < from: CT Chest No Cont (12.21.19 @ 19:39) >  IMPRESSION:   Left-sided chest tube in place with trace left apical pneumothorax.   Patchy airspace opacities in the right lower lobe, differential favors distal mucoid impacted airways versus pneumonia.  Moderate left and small right pleural effusions with mild subsegmental atelectasis of the right lower lobe.      < end of copied text >

## 2019-12-30 NOTE — PROGRESS NOTE ADULT - SUBJECTIVE AND OBJECTIVE BOX
GENERAL SURGERY PROGRESS NOTE    SUBJECTIVE  Patient seen and examined. No acute events overnight. Reports tolerating diet without nausea, vomiting, passing flatus, having bowel movements, voiding without issues, have been ambulating and out of bed. Denies fever, chills, SOB, chest pain.         OBJECTIVE    PHYSICAL EXAM  General: Appears well, NAD  CHEST: breathing comfortably  CV: appears well perfused  Abdomen: soft, nontender, nondistended, no rebound or guarding  Extremities: Grossly symmetric    T(C): 37.1 (12-30-19 @ 03:00), Max: 37.1 (12-29-19 @ 23:00)  HR: 97 (12-30-19 @ 06:07) (81 - 97)  BP: 90/53 (12-30-19 @ 06:07) (72/46 - 158/72)  RR: 28 (12-30-19 @ 06:07) (17 - 44)  SpO2: 96% (12-30-19 @ 06:07) (89% - 99%)    12-29-19 @ 07:01  -  12-30-19 @ 07:00  --------------------------------------------------------  IN: 3254.7 mL / OUT: 3880 mL / NET: -625.3 mL        MEDICATIONS  acyclovir   Oral Tab/Cap 400 milliGRAM(s) Oral three times a day  albuterol/ipratropium for Nebulization 3 milliLiter(s) Nebulizer every 6 hours  buDESOnide    Inhalation Suspension 0.5 milliGRAM(s) Inhalation every 12 hours  ceFAZolin   IVPB      ceFAZolin   IVPB 1000 milliGRAM(s) IV Intermittent every 8 hours  chlorhexidine 2% Cloths 1 Application(s) Topical <User Schedule>  diphenoxylate/atropine 2 Tablet(s) Oral <User Schedule>  fat emulsion (Fish Oil and Plant Based) 20% Infusion 20.8 mL/Hr IV Continuous <Continuous>  insulin lispro (HumaLOG) corrective regimen sliding scale   SubCutaneous three times a day before meals  insulin lispro (HumaLOG) corrective regimen sliding scale   SubCutaneous at bedtime  loperamide 16 milliGRAM(s) Oral <User Schedule>  octreotide  Injectable 100 MICROGram(s) SubCutaneous three times a day  ondansetron Injectable 4 milliGRAM(s) IV Push every 6 hours PRN  opium Tincture 6 milliGRAM(s) Oral <User Schedule>  pantoprazole  Injectable 40 milliGRAM(s) IV Push daily  Parenteral Nutrition - Adult 1 Each TPN Continuous <Continuous>  sodium chloride 0.9%. 1000 milliLiter(s) IV Continuous <Continuous>      LABS                        8.1    16.53 )-----------( 365      ( 30 Dec 2019 01:08 )             25.3     12-30    134<L>  |  98  |  17  ----------------------------<  162<H>  5.1   |  28  |  0.45<L>    Ca    7.7<L>      30 Dec 2019 01:08  Phos  3.7     12-30  Mg     2.0     12-30    TPro  6.0  /  Alb  2.4<L>  /  TBili  0.4  /  DBili  0.2  /  AST  15  /  ALT  20  /  AlkPhos  109  12-30          RADIOLOGY & ADDITIONAL STUDIES GENERAL SURGERY PROGRESS NOTE    SUBJECTIVE  Patient seen and examined. No acute events overnight. Reports tolerating diet without nausea, vomiting, +/+ ostomy.  On 2L O2 NC this AM  Denies fever, chills, SOB, chest pain.     ileostomy 1680cc      OBJECTIVE    PHYSICAL EXAM  General: Appears well, NAD  CHEST: breathing comfortably  CV: appears well perfused  Abdomen: soft, nontender, nondistended, no rebound or guarding, midline incision dressing c/d/i, ostomy +/+  Extremities: Grossly symmetric    T(C): 37.1 (12-30-19 @ 03:00), Max: 37.1 (12-29-19 @ 23:00)  HR: 97 (12-30-19 @ 06:07) (81 - 97)  BP: 90/53 (12-30-19 @ 06:07) (72/46 - 158/72)  RR: 28 (12-30-19 @ 06:07) (17 - 44)  SpO2: 96% (12-30-19 @ 06:07) (89% - 99%)    12-29-19 @ 07:01  -  12-30-19 @ 07:00  --------------------------------------------------------  IN: 3254.7 mL / OUT: 3880 mL / NET: -625.3 mL        MEDICATIONS  acyclovir   Oral Tab/Cap 400 milliGRAM(s) Oral three times a day  albuterol/ipratropium for Nebulization 3 milliLiter(s) Nebulizer every 6 hours  buDESOnide    Inhalation Suspension 0.5 milliGRAM(s) Inhalation every 12 hours  ceFAZolin   IVPB      ceFAZolin   IVPB 1000 milliGRAM(s) IV Intermittent every 8 hours  chlorhexidine 2% Cloths 1 Application(s) Topical <User Schedule>  diphenoxylate/atropine 2 Tablet(s) Oral <User Schedule>  fat emulsion (Fish Oil and Plant Based) 20% Infusion 20.8 mL/Hr IV Continuous <Continuous>  insulin lispro (HumaLOG) corrective regimen sliding scale   SubCutaneous three times a day before meals  insulin lispro (HumaLOG) corrective regimen sliding scale   SubCutaneous at bedtime  loperamide 16 milliGRAM(s) Oral <User Schedule>  octreotide  Injectable 100 MICROGram(s) SubCutaneous three times a day  ondansetron Injectable 4 milliGRAM(s) IV Push every 6 hours PRN  opium Tincture 6 milliGRAM(s) Oral <User Schedule>  pantoprazole  Injectable 40 milliGRAM(s) IV Push daily  Parenteral Nutrition - Adult 1 Each TPN Continuous <Continuous>  sodium chloride 0.9%. 1000 milliLiter(s) IV Continuous <Continuous>      LABS                        8.1    16.53 )-----------( 365      ( 30 Dec 2019 01:08 )             25.3     12-30    134<L>  |  98  |  17  ----------------------------<  162<H>  5.1   |  28  |  0.45<L>    Ca    7.7<L>      30 Dec 2019 01:08  Phos  3.7     12-30  Mg     2.0     12-30    TPro  6.0  /  Alb  2.4<L>  /  TBili  0.4  /  DBili  0.2  /  AST  15  /  ALT  20  /  AlkPhos  109  12-30          RADIOLOGY & ADDITIONAL STUDIES

## 2019-12-30 NOTE — PROGRESS NOTE ADULT - SUBJECTIVE AND OBJECTIVE BOX
Interventional Radiology Pre-Procedure Note    This is a 74y Male with a persistent left PTX and pleural effusion presents to IR for a chest tube insertion.     Procedure: Left chest tube insertion.     Diagnosis/Indication: Patient is a 74y old  Male who presents with a chief complaint of abd. pain (30 Dec 2019 12:54)      PAST MEDICAL & SURGICAL HISTORY:  COPD with hypoxia  ETOHism  ETOH abuse  History of lumbosacral spine surgery  History of prostate surgery  History of appendectomy       Male    Allergies: IV Contrast (Hives)    LABS:  CBC Full  -  ( 30 Dec 2019 01:08 )  WBC Count : 16.53 K/uL  RBC Count : 2.69 M/uL  Hemoglobin : 8.1 g/dL  Hematocrit : 25.3 %  Platelet Count - Automated : 365 K/uL  Mean Cell Volume : 94.1 fl  Mean Cell Hemoglobin : 30.1 pg  Mean Cell Hemoglobin Concentration : 32.0 gm/dL    12-30    134<L>  |  98  |  17  ----------------------------<  162<H>  5.1   |  28  |  0.45<L>    Ca    7.7<L>      30 Dec 2019 01:08  Phos  3.7     12-30  Mg     2.0     12-30    TPro  6.0  /  Alb  2.4<L>  /  TBili  0.4  /  DBili  0.2  /  AST  15  /  ALT  20  /  AlkPhos  109  12-30      Plan:  -Left chest tube insertion for a persistent PTX and pleural effusion  -Patient allergic to IV contrast (Hives)  -Procedure/ risks/ benefits were explained, informed consent obtained from patient, verbalizes understanding. Interventional Radiology Pre-Procedure Note    This is a 74y Male with a persistent left pleural effusion presents to IR for a chest tube insertion.     Procedure: Left chest tube insertion.     Diagnosis/Indication: Patient is a 74y old  Male who presents with a chief complaint of abd. pain (30 Dec 2019 12:54)      PAST MEDICAL & SURGICAL HISTORY:  COPD with hypoxia  ETOHism  ETOH abuse  History of lumbosacral spine surgery  History of prostate surgery  History of appendectomy       Male    Allergies: IV Contrast (Hives)    LABS:  CBC Full  -  ( 30 Dec 2019 01:08 )  WBC Count : 16.53 K/uL  RBC Count : 2.69 M/uL  Hemoglobin : 8.1 g/dL  Hematocrit : 25.3 %  Platelet Count - Automated : 365 K/uL  Mean Cell Volume : 94.1 fl  Mean Cell Hemoglobin : 30.1 pg  Mean Cell Hemoglobin Concentration : 32.0 gm/dL    12-30    134<L>  |  98  |  17  ----------------------------<  162<H>  5.1   |  28  |  0.45<L>    Ca    7.7<L>      30 Dec 2019 01:08  Phos  3.7     12-30  Mg     2.0     12-30    TPro  6.0  /  Alb  2.4<L>  /  TBili  0.4  /  DBili  0.2  /  AST  15  /  ALT  20  /  AlkPhos  109  12-30      Plan:  -Left chest tube insertion for a persistent pleural effusion  -Patient allergic to IV contrast (Hives)  -Procedure/ risks/ benefits were explained, informed consent obtained from patient, verbalizes understanding.

## 2019-12-30 NOTE — PROGRESS NOTE ADULT - ASSESSMENT
73 y/o M presenting with septic shock and high grade SBO s/p exploratory laparotomy, lysis of adhesions, decompression of bowel via enterotomy w/ primary repair, and Abthera VAC placement on 12/6; s/p take down of Abthera, washout and re-application of the Abthera vac on 12/8. Now s/p SBR and end ileostomy on 12/10 acute respiratory distress now improving, Pneumothorax, hyperglycemia, delirium.    PLAN:    Neuro: A&Ox3   - Tylenol if needed for pain    Resp: acute respiratory failure upon initial presentation, COPD at baseline, spontaneous left sided pneumothorax s/p pig-tail catheter placement.   - Continue to monitor on RA  - Continue Duonebs, budesonide  - Patient to go down  for left chest tube today with IR   - F/u AM CXR for evaluation of PTX    CV: septic shock requiring vasopressor support, now hemodynamically stable   - Continuing to hold metoprolol in setting of hypotension     GI: SBO s/p exploratory laparotomy, lysis of adhesions, decompression of bowel via enterotomy w/ primary repair, and Abthera VAC placement and removal with 150 cm of small bowel removed and end ileostomy.   - Regular diet with TPN (calorie count)  - Monitor ostomy output, lomotil 2 tabs q6hrs & Imodium 16 mg q6hrs & tincture of opium 6 mg q6hrs, octreotide 100mcg SQ TID  - Continue 0.5/1 repletions with ostomy     Renal: CHI, resolved   - Replete electrolytes as needed  - Monitor I/Os    Heme: no acute issues  - Lovenox for VTE prophylaxis  - SCDs     ID: No active issues   - Trend WBC, monitor fever curve    Endo: no acute issues  - SSI, q6 fingersticks    Dispo: SICU full code

## 2019-12-30 NOTE — PROGRESS NOTE ADULT - ASSESSMENT
Assessment   75 y/o M presenting with septic shock and high grade SBO s/p exploratory laparotomy, lysis of adhesions, decompression of bowel via enterotomy w/ primary repair, and Abthera VAC placement on 12/6; s/p take down of Abthera, washout and re-application of the Abthera vac on 12/8. Now s/p SBR and end ileostomy on 12/10 acute respiratory distress now improving, Pneumothorax, hyperglycemia, delirium.    Recommendations:  - IR drainage today L chest  - FEES monday   - c/w abx for wound infection  - continued care per SICU    RED SURGERY  [id number] Assessment   73 y/o M presenting with septic shock and high grade SBO s/p exploratory laparotomy, lysis of adhesions, decompression of bowel via enterotomy w/ primary repair, and Abthera VAC placement on 12/6; s/p take down of Abthera, washout and re-application of the Abthera vac on 12/8. Now s/p SBR and end ileostomy on 12/10 acute respiratory distress now improving, Pneumothorax, hyperglycemia, delirium.    Recommendations:  - IR drainage today L chest  - FEES monday   - c/w abx for wound infection  -f/u CT C/A/P  - continued care per SICU    RED SURGERY  [id number]

## 2019-12-30 NOTE — PROGRESS NOTE ADULT - SUBJECTIVE AND OBJECTIVE BOX
Subjective " hello I am feeling better today"    Vital Signs Last 24 Hrs  T(C): 37.4 (19 @ 11:00), Max: 37.4 (19 @ 11:00)  T(F): 99.3 (19 @ 11:00), Max: 99.3 (19 @ 11:00)  HR: 96 (19 @ 12:30) (82 - 97)  BP: 124/58 (19 @ 12:30) (72/46 - 158/72)  RR: 31 (19 @ 12:30) (17 - 44)  SpO2: 89% (19 @ 12:30) (89% - 100%)            @ 07:01  -   @ 07:00  --------------------------------------------------------  IN: 3339.7 mL / OUT: 3880 mL / NET: -540.3 mL     @ 07:01  -   @ 12:57  --------------------------------------------------------  IN: 515 mL / OUT: 1000 mL / NET: -485 mL    ileostomy 1680cc    Daily Weight in k.3 (30 Dec 2019 01:00)      POCT Blood Glucose.: 146 mg/dL (30 Dec 2019 10:03)  POCT Blood Glucose.: 185 mg/dL (30 Dec 2019 04:55)  POCT Blood Glucose.: 127 mg/dL (29 Dec 2019 21:59)  POCT Blood Glucose.: 125 mg/dL (29 Dec 2019 17:53)  MEDICATIONS  (STANDING):  acyclovir   Oral Tab/Cap 400 milliGRAM(s) Oral three times a day  albuterol/ipratropium for Nebulization 3 milliLiter(s) Nebulizer every 6 hours  buDESOnide    Inhalation Suspension 0.5 milliGRAM(s) Inhalation every 12 hours  ceFAZolin   IVPB      ceFAZolin   IVPB 1000 milliGRAM(s) IV Intermittent every 8 hours  chlorhexidine 2% Cloths 1 Application(s) Topical <User Schedule>  diphenoxylate/atropine 2 Tablet(s) Oral <User Schedule>  fat emulsion (Fish Oil and Plant Based) 20% Infusion 20.8 mL/Hr (20.8 mL/Hr) IV Continuous <Continuous>  insulin lispro (HumaLOG) corrective regimen sliding scale   SubCutaneous three times a day before meals  insulin lispro (HumaLOG) corrective regimen sliding scale   SubCutaneous at bedtime  loperamide 16 milliGRAM(s) Oral <User Schedule>  metoprolol tartrate 12.5 milliGRAM(s) Oral every 12 hours  octreotide  Injectable 100 MICROGram(s) SubCutaneous three times a day  opium Tincture 6 milliGRAM(s) Oral <User Schedule>  pantoprazole  Injectable 40 milliGRAM(s) IV Push daily  Parenteral Nutrition - Adult 1 Each (75 mL/Hr) TPN Continuous <Continuous>  Parenteral Nutrition - Adult 1 Each (75 mL/Hr) TPN Continuous <Continuous>  sodium chloride 0.9%. 1000 milliLiter(s) (10 mL/Hr) IV Continuous <Continuous>    MEDICATIONS  (PRN):  ondansetron Injectable 4 milliGRAM(s) IV Push every 6 hours PRN Nausea and/or Vomiting          Drains:   ileostomy 1680cc  :       PHYSICAL EXAM  Neurology: alert and oriented x 3, nonfocal, no gross deficits    CV : s1 S2  Lungs: B/l breath sounds   Abdomen: soft, nontender, nondistended, illeostomy    : voids            Extremities:    equal strength throughout   TPN via centralline                                      Physical Therapy Rec:   Home  [  ]   Home w/ PT  [  ]  Rehab  [  ]    Discussed with Cardiothoracic Team at AM rounds. Subjective " hello I am feeling better today"    Vital Signs Last 24 Hrs  T(C): 37.4 (19 @ 11:00), Max: 37.4 (19 @ 11:00)  T(F): 99.3 (19 @ 11:00), Max: 99.3 (19 @ 11:00)  HR: 96 (19 @ 12:30) (82 - 97)  BP: 124/58 (19 @ 12:30) (72/46 - 158/72)  RR: 31 (19 @ 12:30) (17 - 44)  SpO2: 89% (19 @ 12:30) (89% - 100%)            @ 07:01  -   @ 07:00  --------------------------------------------------------  IN: 3339.7 mL / OUT: 3880 mL / NET: -540.3 mL     @ 07:01  -   @ 12:57  --------------------------------------------------------  IN: 515 mL / OUT: 1000 mL / NET: -485 mL    ileostomy 1680cc    Daily Weight in k.3 (30 Dec 2019 01:00)      POCT Blood Glucose.: 146 mg/dL (30 Dec 2019 10:03)  POCT Blood Glucose.: 185 mg/dL (30 Dec 2019 04:55)  POCT Blood Glucose.: 127 mg/dL (29 Dec 2019 21:59)  POCT Blood Glucose.: 125 mg/dL (29 Dec 2019 17:53)  MEDICATIONS  (STANDING):  acyclovir   Oral Tab/Cap 400 milliGRAM(s) Oral three times a day  albuterol/ipratropium for Nebulization 3 milliLiter(s) Nebulizer every 6 hours  buDESOnide    Inhalation Suspension 0.5 milliGRAM(s) Inhalation every 12 hours  ceFAZolin   IVPB      ceFAZolin   IVPB 1000 milliGRAM(s) IV Intermittent every 8 hours  chlorhexidine 2% Cloths 1 Application(s) Topical <User Schedule>  diphenoxylate/atropine 2 Tablet(s) Oral <User Schedule>  fat emulsion (Fish Oil and Plant Based) 20% Infusion 20.8 mL/Hr (20.8 mL/Hr) IV Continuous <Continuous>  insulin lispro (HumaLOG) corrective regimen sliding scale   SubCutaneous three times a day before meals  insulin lispro (HumaLOG) corrective regimen sliding scale   SubCutaneous at bedtime  loperamide 16 milliGRAM(s) Oral <User Schedule>  metoprolol tartrate 12.5 milliGRAM(s) Oral every 12 hours  octreotide  Injectable 100 MICROGram(s) SubCutaneous three times a day  opium Tincture 6 milliGRAM(s) Oral <User Schedule>  pantoprazole  Injectable 40 milliGRAM(s) IV Push daily  Parenteral Nutrition - Adult 1 Each (75 mL/Hr) TPN Continuous <Continuous>  Parenteral Nutrition - Adult 1 Each (75 mL/Hr) TPN Continuous <Continuous>  sodium chloride 0.9%. 1000 milliLiter(s) (10 mL/Hr) IV Continuous <Continuous>    MEDICATIONS  (PRN):  ondansetron Injectable 4 milliGRAM(s) IV Push every 6 hours PRN Nausea and/or Vomiting          Drains:   ileostomy 1680cc  :       PHYSICAL EXAM  Neurology: alert and oriented x 3, nonfocal, no gross deficits    CV : s1 S2  Lungs: B/l breath sounds   Abdomen: soft, nontender, nondistended, illeostomy    : voids            Extremities:    equal strength throughout   TPN via centralline                                          Discussed with thoracic Team at AM rounds.

## 2019-12-30 NOTE — PROGRESS NOTE ADULT - ASSESSMENT
73 y/o M presenting with septic shock and high grade SBO s/p exploratory laparotomy, lysis of adhesions, decompression of bowel via enterotomy w/ primary repair, and Abthera VAC placement on 12/6; s/p take down of Abthera, washout and re-application of the Abthera vac on 12/8. Now s/p SBR and end ileostomy on 12/10 acute respiratory distress now improving, Pneumothorax, hyperglycemia, delirium. Now still with persistent pneumothorax when chest pigtail clamped.   12/23 VSS on room air recommending IR drainage of left chest  12/24 No evidence of air leak to left chest tube. Tube clamped. Repeat chest cxr in 4 hours. Anticipate chest tube removal if lung remains expanded. Drainage of loculated left effusion discussed with IR. IR to reconsult for drainage once initial tube removed. Discussed plan with Dr Mayes and IR Fellow  12/24  On NC ,    VSSchest xray sm lt pl eff, left chest tube dc'd.  12/26    2l NC   co  sob,  lt side diminshed  12/27 VSS, CXR with unchanged loculated left pleural effusion- IR consulted for pigtail placement, states effusion can be drained via thoracentesis, pigtail placement not indicated at this time - Dr. Mayes to speak with IR attending.   12/30 Patient with persistent PTX  and left pleural effusion now for IR drainage with Dr Elkins.

## 2019-12-30 NOTE — PROGRESS NOTE ADULT - SUBJECTIVE AND OBJECTIVE BOX
Interventional Radiology Brief- Operative Note    Procedure: Left pleural chest tube    Operators: Violette Barker    Anesthesia (type): Lidocaine    Contrast: None    EBL: Minimal    Findings/Follow up Plan of Care: Limited US of the left chest showed a moderate sized pleural effusion    Specimens Removed: 70 cc of yellow pleural fluid    Implants: 10 F Left pleural drain    Complications: Patient had a brief episode of hypotension that resolved with 250 cc of NS. Dr. Chapa of SICU notified.     Condition/Disposition: Stable    Please call Interventional Radiology x [phone] with any questions, concerns, or issues.

## 2019-12-30 NOTE — PROGRESS NOTE ADULT - ASSESSMENT
A/P: 74 year old male w/ PMH of COPD and EtOH dependence with PSH/o appy, prostate surgery, & spine surgery  septic shock and high grade SBO s/p exploratory laparotomy, lysis of adhesions, decompression of bowel via enterotomy w/ primary repair, and Abthera VAC placement on 12/6; s/p take down of Abthera, washout and re-application of the Abthera vac on 12/8. Now s/p SBR and end ileostomy on 12/10.   TPN consulted to assist w/ management of pt's nutrition in pt w/ prolonged hospital course now tolerating diet but has high Ileostomy output     Continue TPN at full strength in pt w/ severe Protein-Calorie Malnutrition- improving high ostomy output  Tolerating Dysphagia 1 Pureed-Nectar Consistency Fluid     TPN GOAL:  120 grams amino acids (800ml 15% a.a.)  210 grams dextrose (300ml 70% dex)  50 grams SMOFlipid (250ml 20%IVFE @ 20.8ml/hr x 12 hours)  in 1800mL volume  Micronutrients: 10ml MVI, 3ml MTE-5    HypoNa  improving & will continue to monitor-  Elevated K - improving w/ decreased KCl at 60 mEq KCl in TPN          possibly due to Octreotide  HypoPhos resolving, now with rising Phos-  improving - continue NaPhos of 45mMol   Strict Intake and Output- high ileostomy output -       SICU to replete prn - pt has had HypoTn that improved w/ IVF       pt started on Octreotide - will take 5-7d to see full benefit       pt now on increased lomotil, imodium, tincture of opium, & Octreotide   Consider sending ostomy output for electrolyte evaluation or fecal fat testing to see what pt is absorbing        to assess ongoing needs in pt with concerns for Short Gut Syndrome  Hyperglycemia - improving - 5U Insulin added to TPN      Fingersticks & ISS coverage - to be ordered by primary team  Pt for IR drainage of Loculated Lt chest pleural effusion  PICC w/dedicated port for only TPN - maintenance as per protocol  Weights three times a week  Monitor BMP, Mg, Ionized Ca, Phosphorus daily  Continue to monitor Triglycerides weekly and Pre-albumin weekly  Continue as per SICU / Surgery, will follow with you, D/w primary team    Andreina Hubbard PA-C  TPN team, pager [phone]  D/w Ana M Siegel A/P: 74 year old male w/ PMH of COPD and EtOH dependence with PSH/o appy, prostate surgery, & spine surgery  septic shock and high grade SBO s/p exploratory laparotomy, lysis of adhesions, decompression of bowel via enterotomy w/ primary repair, and Abthera VAC placement on 12/6; s/p take down of Abthera, washout and re-application of the Abthera vac on 12/8. Now s/p SBR and end ileostomy on 12/10.   TPN consulted to assist w/ management of pt's nutrition in pt w/ prolonged hospital course now tolerating diet but has high Ileostomy output     Continue TPN at full strength in pt w/ severe Protein-Calorie Malnutrition- improving high ostomy output  Tolerating Dysphagia 1 Pureed-Nectar Consistency Fluid     TPN GOAL:  120 grams amino acids (800ml 15% a.a.)  210 grams dextrose (300ml 70% dex)  50 grams SMOFlipid (250ml 20%IVFE @ 20.8ml/hr x 12 hours)  in 1800mL volume  Micronutrients: 10ml MVI, 3ml MTE-5    HypoNa  improving & will continue to monitor-  Elevated K -decreased to  KCl at 40 mEq KCl in TPN          possibly due to Octreotide  HypoPhos resolving, now with rising Phos-  improving - continue NaPhos of 45mMol   Strict Intake and Output- high ileostomy output -       SICU to replete prn - pt has had HypoTn that improved w/ IVF       pt started on Octreotide - will take 5-7d to see full benefit       pt now on increased lomotil, imodium, tincture of opium, & Octreotide   Consider sending ostomy output for electrolyte evaluation or fecal fat testing to see what pt is absorbing        to assess ongoing needs in pt with concerns for Short Gut Syndrome  Hyperglycemia - improving - 5U Insulin added to TPN      Fingersticks & ISS coverage - to be ordered by primary team  Pt for IR drainage of Loculated Lt chest pleural effusion  PICC w/dedicated port for only TPN - maintenance as per protocol  Weights three times a week  Monitor BMP, Mg, Ionized Ca, Phosphorus daily  Continue to monitor Triglycerides weekly and Pre-albumin weekly  Continue as per SICU / Surgery, will follow with you, D/w primary team    Andreina Hubbard PA-C  TPN team, pager [phone]  D/w Ana M Siegel

## 2019-12-30 NOTE — PROGRESS NOTE ADULT - ATTENDING COMMENTS
Pt seen and examined.  Chart reviewed.  Resident note confirmed.  Pt is a 74 year old male with a medical history signficant for CAD/ETOH abuse who presented to [facility] with septic shock. Work up revealed a high grade SBO with a suggestion of intestinal necrosis. Pt underwent expl lap/intestinal resection/2nd look lap/ileostomy. Ileostomy output remains high. Pt continues on volume resusc and multimodal tx for high ileost output.     PMH/PSH/MEDS/ALL/SH/FH/ROS:  Unchanged from H&P above  Vitals/PE/Labs/Radiographic data:  Reviewed     A/P  Neuro:  Continue Pain control  	Continue supportive care		    CVS:	Alcoholic cardiomyopathy  	EF 30%  	Monitor vitals    Pulm:  	Atelectasis  	S/p ptx/pigtail   	Continue ISP    GI:	Intestinal necrosis, s/p rsn/ileostomy  	CT abd reviewed. It reveals a seroma  	Continue aspiration precaution diet  	Continue nocturnal tube feeds  	FEES today      :  	S/p CHI  	Monitor I’s and O’s    Heme:   acute blood loss anemia  	Monitor H/h    ID: 	Leukocytosis  	Culture for fever

## 2019-12-30 NOTE — PROGRESS NOTE ADULT - SUBJECTIVE AND OBJECTIVE BOX
[facility] NUTRITION SUPPORT / TPN -- FOLLOW UP NOTE  --------------------------------------------------------------------------------    24 hour events/subjective:  Pt seen prior to going to IR-   CT of chest / abd/ pelvis from this am noted- for IR drainage today  Ancef started for concern re cellulitis periincisional  NPO/ TPN  Pt denies any pain/ sob/ dyspnea/ cough/ palp  lips not painful - less peeling  no n/v   Tolerating Dysphagia Diet  Decreased Ileostomy output noted- 1600mL for last 24h       Continue Octreotide, lomotil, Imodium, & tincture of opium              Octreotide but need 5-7d to see improvement       PPI to help decrease gastric secretions  Hypotension improved w/ increased IVF (ileostomy fluid repletion restarted)  Continue to monitor for aspiration- f/u plan for FEES  No f/c/s      Diet:  Diet, Dysphagia 1 Pureed-Nectar Consistency Fluid (12-25-19 @ 15:39)  Diet, NPO after Midnight:      NPO Start Date: 29-Dec-2019,   NPO Start Time: 23:59  Except Medications (12-30-19 @ 08:34)      Appetite: [  ]Poor [ x ]Adequate [  ]Good  Caloric intake:  [   x]  Adequate   [   ] Inadequate    ROS: General/ GI see HPI  all other systems negative      ALLERGIES & MEDICATIONS  --------------------------------------------------------------------------------  ALLERGIES  IV Contrast (Hives)    STANDING INPATIENT MEDICATIONS    acyclovir   Oral Tab/Cap 400 milliGRAM(s) Oral three times a day  albuterol/ipratropium for Nebulization 3 milliLiter(s) Nebulizer every 6 hours  buDESOnide    Inhalation Suspension 0.5 milliGRAM(s) Inhalation every 12 hours  ceFAZolin   IVPB      ceFAZolin   IVPB 1000 milliGRAM(s) IV Intermittent every 8 hours  chlorhexidine 2% Cloths 1 Application(s) Topical <User Schedule>  diphenoxylate/atropine 2 Tablet(s) Oral <User Schedule>  fat emulsion (Fish Oil and Plant Based) 20% Infusion 20.8 mL/Hr IV Continuous <Continuous>  insulin lispro (HumaLOG) corrective regimen sliding scale   SubCutaneous three times a day before meals  insulin lispro (HumaLOG) corrective regimen sliding scale   SubCutaneous at bedtime  loperamide 16 milliGRAM(s) Oral <User Schedule>  metoprolol tartrate 12.5 milliGRAM(s) Oral every 12 hours  octreotide  Injectable 100 MICROGram(s) SubCutaneous three times a day  opium Tincture 6 milliGRAM(s) Oral <User Schedule>  pantoprazole  Injectable 40 milliGRAM(s) IV Push daily  Parenteral Nutrition - Adult 1 Each TPN Continuous <Continuous>  Parenteral Nutrition - Adult 1 Each TPN Continuous <Continuous>  sodium chloride 0.9%. 1000 milliLiter(s) IV Continuous <Continuous>      PRN INPATIENT MEDICATION  ondansetron Injectable 4 milliGRAM(s) IV Push every 6 hours PRN        VITALS/PHYSICAL EXAM  --------------------------------------------------------------------------------  T(C): 37.4 (12-30-19 @ 11:00), Max: 37.4 (12-30-19 @ 11:00)  HR: 91 (12-30-19 @ 13:00) (82 - 97)  BP: 102/56 (12-30-19 @ 13:00) (90/50 - 158/72)  RR: 22 (12-30-19 @ 13:00) (17 - 31)  SpO2: 94% (12-30-19 @ 13:00) (89% - 100%)  Wt(kg): --        12-29-19 @ 07:01  -  12-30-19 @ 07:00  --------------------------------------------------------  IN: 3339.7 mL / OUT: 3880 mL / NET: -540.3 mL    12-30-19 @ 07:01  -  12-30-19 @ 14:42  --------------------------------------------------------  IN: 600 mL / OUT: 1000 mL / NET: -400 mL      PHYSICAL EXAM  --------------------------------------------------------------------------------  	Gen: guarded but stable, A&Ox3  	HEENT: NC/AT, PERRL, supple neck, clear oropharynx, dried ruptured blisters  	GI: (+) BS, softly distended, non tender,                    midline incision w/staples c/d/i w/o s/sx infection                   (+)ostomy pink viable- thick bilious liquid stool like material              MSK: FROM, no contractures nor deformities  	Vascular: Equally Warm, no clubbing, cyanosis, nor edema                        Rt PICC dressing c/d/I   	Neuro: No focal deficits, intact sensation, weakened strength  	Psych: Normal affect and mood  	Skin: Warm, without rashes, good turgor      LABS/ CULTURES/ RADIOLOGY:              8.1    16.53 >-----------<  365      [12-30-19 @ 01:08]              25.3     134  |  98  |  17  ----------------------------<  162      [12-30-19 @ 01:08]  5.1   |  28  |  0.45        Ca     7.7     [12-30-19 @ 01:08]      Mg     2.0     [12-30-19 @ 01:08]      Phos  3.7     [12-30-19 @ 01:08]    TPro  6.0  /  Alb  2.4  /  TBili  0.4  /  DBili  0.2  /  AST  15  /  ALT  20  /  AlkPhos  109  [12-30-19 @ 01:08]        Serum Osmolality 285      [12-29-19 @ 12:45]    CAPILLARY BLOOD GLUCOS    POCT Blood Glucose.: 146 mg/dL (30 Dec 2019 10:03)  POCT Blood Glucose.: 185 mg/dL (30 Dec 2019 04:55)  POCT Blood Glucose.: 127 mg/dL (29 Dec 2019 21:59)  POCT Blood Glucose.: 125 mg/dL (29 Dec 2019 17:53)    Prealbumin, Serum: 16 mg/dL (12-30-19 @ 07:53)  Prealbumin, Serum: 13 mg/dL (12-25-19 @ 04:24)  Prealbumin, Serum: 14 mg/dL (12-24-19 @ 02:35)  Prealbumin, Serum: 14 mg/dL (12-23-19 @ 06:28)  Prealbumin, Serum: 15 mg/dL (12-20-19 @ 02:49)      Triglycerides, Serum: 51 mg/dL (12.30.19 @ 01:08)  Triglycerides, Serum: 75 mg/dL (12.25.19 @ 02:29)  Triglycerides, Serum: 69 mg/dL (12.24.19 @ 01:10)  Triglycerides, Serum: 73 mg/dL (12.23.19 @ 00:50)    < from: CT Abdomen and Pelvis w/ IV Cont (12.30.19 @ 06:05) >  FINDINGS:    CHEST:     LUNGS AND LARGE AIRWAYS: Secretions in the right mainstem bronchus, bronchus intermedius, and bilateral lower lobe bronchi. Unchanged patchy right lower lobe tree-in-bud and nodular opacities. Severe emphysema. Compressive atelectasis in the lower lobes bilaterally.  PLEURA: Moderate left and small to moderate right pleural effusions, slightly increased in size from prior study. Interval resolution of the left pneumothorax. Previously noted left pigtail catheter has been removed.  VESSELS: Right upper extremity PICC line with the tip in the SVC.Atherosclerotic calcifications upper of the aorta and coronary arteries.  HEART: Heart size is normal. No pericardial effusion.  MEDIASTINUM AND MIKI: No lymphadenopathy.  CHEST WALL AND LOWER NECK: Within normal limits.    ABDOMEN AND PELVIS:    LIVER: Subcentimeter hypodense focus in the right hepatic lobe, too small to characterize.  BILE DUCTS: Normal caliber.  GALLBLADDER: Within normal limits.  SPLEEN: Within normal limits.  PANCREAS: Within normal limits.  ADRENALS: Within normal limits.  KIDNEYS/URETERS: Within normal limits.    BLADDER: Distended.  REPRODUCTIVE ORGANS: Prostatectomy.    BOWEL: Right lower quadrant ileostomy. No bowel obstruction.   PERITONEUM: No ascites. Multiple nonspecific peritoneal calcifications  VESSELS: Atherosclerotic changes.  RETROPERITONEUM/LYMPH NODES: No lymphadenopathy.    ABDOMINAL WALL: Anterior abdominal postsurgical changes with associated midline fluid collection measuring 0.8 x 2.0 x 6.6 cm (AP by transverse by craniocaudal).  BONES: Degenerative changes.    IMPRESSION:     Small midline anterior abdominal wall fluid collection may represent postoperative seroma, hematoma, or less likely abscess.    Interval resolution of the left pneumothorax.    Bilateral pleural effusions, left greaterthan right, slightly increased in size compared to the prior study.    Unchanged right lower lobe tree-in-bud/nodular opacities with secretions in the bilateral lower lobe bronchi. Findings may represent pneumonia versus distal mucoid impacted airways.    < end of copied text >

## 2019-12-30 NOTE — PROGRESS NOTE ADULT - SUBJECTIVE AND OBJECTIVE BOX
Subjective: Patient seen and examined. No new events except as noted.   remains in ICU   feels ok   no cp or sob   s/p CT this am     24 HOUR EVENTS:  - Restarted 05./1 repletions   - Beta blocker held      REVIEW OF SYSTEMS:    CONSTITUTIONAL: +weakness, fevers or chills  EYES/ENT: No visual changes;  No vertigo or throat pain   NECK: No pain or stiffness  RESPIRATORY: No cough, wheezing, hemoptysis; No shortness of breath  CARDIOVASCULAR: No chest pain or palpitations  GASTROINTESTINAL: No abdominal or epigastric pain. No nausea, vomiting, or hematemesis; No diarrhea or constipation. No melena or hematochezia.  GENITOURINARY: No dysuria, frequency or hematuria  NEUROLOGICAL: No numbness or weakness  SKIN: No itching, burning, rashes, or lesions   All other review of systems is negative unless indicated above.    MEDICATIONS:  MEDICATIONS  (STANDING):  acyclovir   Oral Tab/Cap 400 milliGRAM(s) Oral three times a day  albuterol/ipratropium for Nebulization 3 milliLiter(s) Nebulizer every 6 hours  buDESOnide    Inhalation Suspension 0.5 milliGRAM(s) Inhalation every 12 hours  ceFAZolin   IVPB      ceFAZolin   IVPB 1000 milliGRAM(s) IV Intermittent every 8 hours  chlorhexidine 2% Cloths 1 Application(s) Topical <User Schedule>  diphenoxylate/atropine 2 Tablet(s) Oral <User Schedule>  fat emulsion (Fish Oil and Plant Based) 20% Infusion 20.8 mL/Hr (20.8 mL/Hr) IV Continuous <Continuous>  insulin lispro (HumaLOG) corrective regimen sliding scale   SubCutaneous three times a day before meals  insulin lispro (HumaLOG) corrective regimen sliding scale   SubCutaneous at bedtime  loperamide 16 milliGRAM(s) Oral <User Schedule>  octreotide  Injectable 100 MICROGram(s) SubCutaneous three times a day  opium Tincture 6 milliGRAM(s) Oral <User Schedule>  pantoprazole  Injectable 40 milliGRAM(s) IV Push daily  Parenteral Nutrition - Adult 1 Each (75 mL/Hr) TPN Continuous <Continuous>  Parenteral Nutrition - Adult 1 Each (75 mL/Hr) TPN Continuous <Continuous>  sodium chloride 0.9%. 1000 milliLiter(s) (10 mL/Hr) IV Continuous <Continuous>      PHYSICAL EXAM:  T(C): 37.4 (12-30-19 @ 11:00), Max: 37.4 (12-30-19 @ 11:00)  HR: 90 (12-30-19 @ 11:12) (82 - 97)  BP: 124/63 (12-30-19 @ 11:00) (72/46 - 158/72)  RR: 21 (12-30-19 @ 11:00) (17 - 44)  SpO2: 95% (12-30-19 @ 11:12) (92% - 100%)  Wt(kg): --  I&O's Summary    29 Dec 2019 07:01  -  30 Dec 2019 07:00  --------------------------------------------------------  IN: 3339.7 mL / OUT: 3880 mL / NET: -540.3 mL    30 Dec 2019 07:01  -  30 Dec 2019 11:48  --------------------------------------------------------  IN: 430 mL / OUT: 700 mL / NET: -270 mL          Appearance: NAD   HEENT:   Dry oral mucosa, crusted lips   Lymphatic: No lymphadenopathy   Cardiovascular: Normal S1 S2, No JVD, No murmurs , Peripheral pulses palpable 2+ bilaterally  Respiratory: Decreased bs   Gastrointestinal:  Soft, NT, ND. Ostomy pink with output. Midline staples in place, midline incision site is c/d/i   Skin: No rashes, No ecchymoses, No cyanosis, warm to touch  Musculoskeletal: Decreased  range of motion and strength  Psychiatry:  mildly sedated   Ext: No edema +PICC line   +rosas   +rectal tube     LABS:    CARDIAC MARKERS:                                8.1    16.53 )-----------( 365      ( 30 Dec 2019 01:08 )             25.3     12-30    134<L>  |  98  |  17  ----------------------------<  162<H>  5.1   |  28  |  0.45<L>    Ca    7.7<L>      30 Dec 2019 01:08  Phos  3.7     12-30  Mg     2.0     12-30    TPro  6.0  /  Alb  2.4<L>  /  TBili  0.4  /  DBili  0.2  /  AST  15  /  ALT  20  /  AlkPhos  109  12-30    proBNP:   Lipid Profile:   HgA1c:   TSH:             TELEMETRY: 	SR    ECG:  	  RADIOLOGY:   < from: CT Abdomen and Pelvis w/ IV Cont (12.30.19 @ 06:05) >    EXAM:  CT ABDOMEN AND PELVIS IC                          EXAM:  CT CHEST IC                            PROCEDURE DATE:  12/30/2019            INTERPRETATION:  CLINICAL INFORMATION: Leukocytosis and wound erythema. Evaluate for source of infection. Follow-up evaluation of pneumothorax after pigtail placement.   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    COMPARISON: Chest CT from 12/21/2019. CT abdomen pelvis from 12/6/2019.    PROCEDURE:   CT of the Chest, Abdomen and Pelvis was performed with intravenous contrast.   Intravenous contrast: 90 ml Omnipaque 350. 10 ml discarded.  Oral contrast: None.  Sagittal and coronal reformats were performed.    FINDINGS:    CHEST:     LUNGS AND LARGE AIRWAYS: Secretions in the right mainstem bronchus, bronchus intermedius, and bilateral lower lobe bronchi. Unchanged patchy right lower lobe tree-in-bud and nodular opacities. Severe emphysema. Compressive atelectasis in the lower lobes bilaterally.  PLEURA: Moderate left and small to moderate right pleural effusions, slightly increased in size from prior study. Interval resolution of the left pneumothorax. Previously noted left pigtail catheter has been removed.  VESSELS: Right upper extremity PICC line with the tip in the SVC.Atherosclerotic calcifications upper of the aorta and coronary arteries.  HEART: Heart size is normal. No pericardial effusion.  MEDIASTINUM AND MIKI: No lymphadenopathy.  CHEST WALL AND LOWER NECK: Within normal limits.    ABDOMEN AND PELVIS:    LIVER: Subcentimeter hypodense focus in the right hepatic lobe, too small to characterize.  BILE DUCTS: Normal caliber.  GALLBLADDER: Within normal limits.  SPLEEN: Within normal limits.  PANCREAS: Within normal limits.  ADRENALS: Within normal limits.  KIDNEYS/URETERS: Within normal limits.    BLADDER: Distended.  REPRODUCTIVE ORGANS: Prostatectomy.    BOWEL: Right lower quadrant ileostomy. No bowel obstruction.   PERITONEUM: No ascites. Multiple nonspecific peritoneal calcifications  VESSELS: Atherosclerotic changes.  RETROPERITONEUM/LYMPH NODES: No lymphadenopathy.    ABDOMINAL WALL: Anterior abdominal postsurgical changes with associated midline fluid collection measuring 0.8 x 2.0 x 6.6 cm (AP by transverse by craniocaudal).  BONES: Degenerative changes.    IMPRESSION:     Small midline anterior abdominal wall fluid collection may represent postoperative seroma, hematoma, or less likely abscess.    Interval resolution of the left pneumothorax.    Bilateral pleural effusions, left greaterthan right, slightly increased in size compared to the prior study.    Unchanged right lower lobe tree-in-bud/nodular opacities with secretions in the bilateral lower lobe bronchi. Findings may represent pneumonia versus distal mucoid impacted airways.                AMBROCIO ROBIN M.D., RADIOLOGY RESIDENT  This document has been electronically signed.  BINU DUENAS M.D., ATTENDING RADIOLOGIST  This document has been electronically signed. Dec 30 2019 10:59AM                < end of copied text >    DIAGNOSTIC TESTING:  [ ] Echocardiogram:  [ ]  Catheterization:  [ ] Stress Test:    OTHER:

## 2019-12-31 LAB
ALBUMIN SERPL ELPH-MCNC: 2.8 G/DL — LOW (ref 3.3–5)
ALP SERPL-CCNC: 80 U/L — SIGNIFICANT CHANGE UP (ref 40–120)
ALT FLD-CCNC: 14 U/L — SIGNIFICANT CHANGE UP (ref 10–45)
ANION GAP SERPL CALC-SCNC: 9 MMOL/L — SIGNIFICANT CHANGE UP (ref 5–17)
AST SERPL-CCNC: 9 U/L — LOW (ref 10–40)
BILIRUB DIRECT SERPL-MCNC: 0.2 MG/DL — SIGNIFICANT CHANGE UP (ref 0–0.2)
BILIRUB INDIRECT FLD-MCNC: 0.1 MG/DL — LOW (ref 0.2–1)
BILIRUB SERPL-MCNC: 0.3 MG/DL — SIGNIFICANT CHANGE UP (ref 0.2–1.2)
BUN SERPL-MCNC: 21 MG/DL — SIGNIFICANT CHANGE UP (ref 7–23)
CALCIUM SERPL-MCNC: 7.7 MG/DL — LOW (ref 8.4–10.5)
CHLORIDE SERPL-SCNC: 98 MMOL/L — SIGNIFICANT CHANGE UP (ref 96–108)
CO2 SERPL-SCNC: 28 MMOL/L — SIGNIFICANT CHANGE UP (ref 22–31)
CREAT SERPL-MCNC: 0.51 MG/DL — SIGNIFICANT CHANGE UP (ref 0.5–1.3)
GAS PNL BLDV: SIGNIFICANT CHANGE UP
GLUCOSE BLDC GLUCOMTR-MCNC: 105 MG/DL — HIGH (ref 70–99)
GLUCOSE BLDC GLUCOMTR-MCNC: 109 MG/DL — HIGH (ref 70–99)
GLUCOSE BLDC GLUCOMTR-MCNC: 113 MG/DL — HIGH (ref 70–99)
GLUCOSE BLDC GLUCOMTR-MCNC: 134 MG/DL — HIGH (ref 70–99)
GLUCOSE SERPL-MCNC: 113 MG/DL — HIGH (ref 70–99)
HCT VFR BLD CALC: 20.6 % — CRITICAL LOW (ref 39–50)
HCT VFR BLD CALC: 26.1 % — LOW (ref 39–50)
HGB BLD-MCNC: 6.5 G/DL — CRITICAL LOW (ref 13–17)
HGB BLD-MCNC: 8.1 G/DL — LOW (ref 13–17)
MAGNESIUM SERPL-MCNC: 1.9 MG/DL — SIGNIFICANT CHANGE UP (ref 1.6–2.6)
MCHC RBC-ENTMCNC: 29.6 PG — SIGNIFICANT CHANGE UP (ref 27–34)
MCHC RBC-ENTMCNC: 30.2 PG — SIGNIFICANT CHANGE UP (ref 27–34)
MCHC RBC-ENTMCNC: 31 GM/DL — LOW (ref 32–36)
MCHC RBC-ENTMCNC: 31.6 GM/DL — LOW (ref 32–36)
MCV RBC AUTO: 95.3 FL — SIGNIFICANT CHANGE UP (ref 80–100)
MCV RBC AUTO: 95.8 FL — SIGNIFICANT CHANGE UP (ref 80–100)
NRBC # BLD: 0 /100 WBCS — SIGNIFICANT CHANGE UP (ref 0–0)
NRBC # BLD: 0 /100 WBCS — SIGNIFICANT CHANGE UP (ref 0–0)
PHOSPHATE SERPL-MCNC: 3.6 MG/DL — SIGNIFICANT CHANGE UP (ref 2.5–4.5)
PLATELET # BLD AUTO: 290 K/UL — SIGNIFICANT CHANGE UP (ref 150–400)
PLATELET # BLD AUTO: 314 K/UL — SIGNIFICANT CHANGE UP (ref 150–400)
POTASSIUM SERPL-MCNC: 3.9 MMOL/L — SIGNIFICANT CHANGE UP (ref 3.5–5.3)
POTASSIUM SERPL-SCNC: 3.9 MMOL/L — SIGNIFICANT CHANGE UP (ref 3.5–5.3)
PREALB SERPL-MCNC: 15 MG/DL — LOW (ref 20–40)
PROT SERPL-MCNC: 5.6 G/DL — LOW (ref 6–8.3)
RBC # BLD: 2.15 M/UL — LOW (ref 4.2–5.8)
RBC # BLD: 2.74 M/UL — LOW (ref 4.2–5.8)
RBC # FLD: 14.1 % — SIGNIFICANT CHANGE UP (ref 10.3–14.5)
RBC # FLD: 14.5 % — SIGNIFICANT CHANGE UP (ref 10.3–14.5)
SODIUM SERPL-SCNC: 135 MMOL/L — SIGNIFICANT CHANGE UP (ref 135–145)
TRIGL SERPL-MCNC: 39 MG/DL — SIGNIFICANT CHANGE UP (ref 10–149)
TROPONIN T, HIGH SENSITIVITY RESULT: 64 NG/L — HIGH (ref 0–51)
TROPONIN T, HIGH SENSITIVITY RESULT: 74 NG/L — HIGH (ref 0–51)
TROPONIN T, HIGH SENSITIVITY RESULT: 79 NG/L — HIGH (ref 0–51)
WBC # BLD: 13.97 K/UL — HIGH (ref 3.8–10.5)
WBC # BLD: 16.51 K/UL — HIGH (ref 3.8–10.5)
WBC # FLD AUTO: 13.97 K/UL — HIGH (ref 3.8–10.5)
WBC # FLD AUTO: 16.51 K/UL — HIGH (ref 3.8–10.5)

## 2019-12-31 PROCEDURE — 93308 TTE F-UP OR LMTD: CPT | Mod: 26

## 2019-12-31 PROCEDURE — 71045 X-RAY EXAM CHEST 1 VIEW: CPT | Mod: 26

## 2019-12-31 PROCEDURE — 99232 SBSQ HOSP IP/OBS MODERATE 35: CPT

## 2019-12-31 PROCEDURE — 99231 SBSQ HOSP IP/OBS SF/LOW 25: CPT

## 2019-12-31 PROCEDURE — 99291 CRITICAL CARE FIRST HOUR: CPT

## 2019-12-31 PROCEDURE — 93010 ELECTROCARDIOGRAM REPORT: CPT

## 2019-12-31 PROCEDURE — 93321 DOPPLER ECHO F-UP/LMTD STD: CPT | Mod: 26

## 2019-12-31 RX ORDER — I.V. FAT EMULSION 20 G/100ML
20.8 EMULSION INTRAVENOUS
Qty: 50 | Refills: 0 | Status: DISCONTINUED | OUTPATIENT
Start: 2019-12-31 | End: 2020-01-01

## 2019-12-31 RX ORDER — SODIUM CHLORIDE 9 MG/ML
1000 INJECTION INTRAMUSCULAR; INTRAVENOUS; SUBCUTANEOUS
Refills: 0 | Status: DISCONTINUED | OUTPATIENT
Start: 2019-12-31 | End: 2020-01-01

## 2019-12-31 RX ORDER — ELECTROLYTE SOLUTION,INJ
1 VIAL (ML) INTRAVENOUS
Refills: 0 | Status: DISCONTINUED | OUTPATIENT
Start: 2019-12-31 | End: 2019-12-31

## 2019-12-31 RX ORDER — ENOXAPARIN SODIUM 100 MG/ML
40 INJECTION SUBCUTANEOUS DAILY
Refills: 0 | Status: DISCONTINUED | OUTPATIENT
Start: 2019-12-31 | End: 2020-01-08

## 2019-12-31 RX ORDER — OXYCODONE HYDROCHLORIDE 5 MG/1
2.5 TABLET ORAL ONCE
Refills: 0 | Status: DISCONTINUED | OUTPATIENT
Start: 2019-12-31 | End: 2019-12-31

## 2019-12-31 RX ORDER — ALBUMIN HUMAN 25 %
500 VIAL (ML) INTRAVENOUS ONCE
Refills: 0 | Status: COMPLETED | OUTPATIENT
Start: 2019-12-31 | End: 2019-12-31

## 2019-12-31 RX ORDER — I.V. FAT EMULSION 20 G/100ML
20.8 EMULSION INTRAVENOUS
Qty: 50 | Refills: 0 | Status: DISCONTINUED | OUTPATIENT
Start: 2019-12-31 | End: 2019-12-31

## 2019-12-31 RX ADMIN — OCTREOTIDE ACETATE 100 MICROGRAM(S): 200 INJECTION, SOLUTION INTRAVENOUS; SUBCUTANEOUS at 22:17

## 2019-12-31 RX ADMIN — SODIUM CHLORIDE 10 MILLILITER(S): 9 INJECTION INTRAMUSCULAR; INTRAVENOUS; SUBCUTANEOUS at 22:16

## 2019-12-31 RX ADMIN — Medication 400 MILLIGRAM(S): at 05:22

## 2019-12-31 RX ADMIN — Medication 2 TABLET(S): at 12:53

## 2019-12-31 RX ADMIN — I.V. FAT EMULSION 20.8 ML/HR: 20 EMULSION INTRAVENOUS at 17:11

## 2019-12-31 RX ADMIN — Medication 100 MILLIGRAM(S): at 13:19

## 2019-12-31 RX ADMIN — Medication 100 MILLIGRAM(S): at 05:23

## 2019-12-31 RX ADMIN — OXYCODONE HYDROCHLORIDE 2.5 MILLIGRAM(S): 5 TABLET ORAL at 05:52

## 2019-12-31 RX ADMIN — SODIUM CHLORIDE 10 MILLILITER(S): 9 INJECTION INTRAMUSCULAR; INTRAVENOUS; SUBCUTANEOUS at 07:50

## 2019-12-31 RX ADMIN — Medication 400 MILLIGRAM(S): at 22:16

## 2019-12-31 RX ADMIN — Medication 16 MILLIGRAM(S): at 12:53

## 2019-12-31 RX ADMIN — MORPHINE 6 MILLIGRAM(S): 10 SOLUTION ORAL at 17:09

## 2019-12-31 RX ADMIN — Medication 0.5 MILLIGRAM(S): at 17:54

## 2019-12-31 RX ADMIN — OXYCODONE HYDROCHLORIDE 2.5 MILLIGRAM(S): 5 TABLET ORAL at 05:22

## 2019-12-31 RX ADMIN — Medication 3 MILLILITER(S): at 17:54

## 2019-12-31 RX ADMIN — OCTREOTIDE ACETATE 100 MICROGRAM(S): 200 INJECTION, SOLUTION INTRAVENOUS; SUBCUTANEOUS at 05:23

## 2019-12-31 RX ADMIN — PANTOPRAZOLE SODIUM 40 MILLIGRAM(S): 20 TABLET, DELAYED RELEASE ORAL at 12:53

## 2019-12-31 RX ADMIN — Medication 3 MILLILITER(S): at 05:44

## 2019-12-31 RX ADMIN — Medication 250 MILLILITER(S): at 00:52

## 2019-12-31 RX ADMIN — Medication 1 EACH: at 17:11

## 2019-12-31 RX ADMIN — Medication 3 MILLILITER(S): at 12:20

## 2019-12-31 RX ADMIN — Medication 2 TABLET(S): at 22:16

## 2019-12-31 RX ADMIN — Medication 16 MILLIGRAM(S): at 17:10

## 2019-12-31 RX ADMIN — Medication 0.5 MILLIGRAM(S): at 05:44

## 2019-12-31 RX ADMIN — Medication 2 TABLET(S): at 17:09

## 2019-12-31 RX ADMIN — CHLORHEXIDINE GLUCONATE 1 APPLICATION(S): 213 SOLUTION TOPICAL at 05:23

## 2019-12-31 RX ADMIN — ENOXAPARIN SODIUM 40 MILLIGRAM(S): 100 INJECTION SUBCUTANEOUS at 12:53

## 2019-12-31 RX ADMIN — Medication 16 MILLIGRAM(S): at 07:43

## 2019-12-31 RX ADMIN — SODIUM CHLORIDE 10 MILLILITER(S): 9 INJECTION INTRAMUSCULAR; INTRAVENOUS; SUBCUTANEOUS at 17:10

## 2019-12-31 RX ADMIN — MORPHINE 6 MILLIGRAM(S): 10 SOLUTION ORAL at 22:16

## 2019-12-31 RX ADMIN — Medication 16 MILLIGRAM(S): at 23:26

## 2019-12-31 RX ADMIN — MORPHINE 6 MILLIGRAM(S): 10 SOLUTION ORAL at 12:53

## 2019-12-31 RX ADMIN — Medication 100 MILLIGRAM(S): at 22:16

## 2019-12-31 RX ADMIN — Medication 400 MILLIGRAM(S): at 13:14

## 2019-12-31 RX ADMIN — Medication 2 TABLET(S): at 07:43

## 2019-12-31 RX ADMIN — MORPHINE 6 MILLIGRAM(S): 10 SOLUTION ORAL at 07:43

## 2019-12-31 RX ADMIN — OCTREOTIDE ACETATE 100 MICROGRAM(S): 200 INJECTION, SOLUTION INTRAVENOUS; SUBCUTANEOUS at 13:14

## 2019-12-31 RX ADMIN — SODIUM CHLORIDE 10 MILLILITER(S): 9 INJECTION INTRAMUSCULAR; INTRAVENOUS; SUBCUTANEOUS at 11:28

## 2019-12-31 NOTE — PROGRESS NOTE ADULT - SUBJECTIVE AND OBJECTIVE BOX
Interventional Radiology    S/P Left pigtail chest tube, POD # 1.  Pt admits to shortness of breath.  He denies general chest pain but admits to pain around the entry site of the chest tube.     Vital Signs Last 24 Hrs  T(C): 36.6 (31 Dec 2019 11:00), Max: 36.9 (30 Dec 2019 23:00)  T(F): 97.9 (31 Dec 2019 11:00), Max: 98.4 (30 Dec 2019 23:00)  HR: 84 (31 Dec 2019 11:05) (72 - 96)  BP: 139/63 (31 Dec 2019 11:05) (71/44 - 139/63)  BP(mean): 91 (31 Dec 2019 11:05) (54 - 95)  RR: 27 (31 Dec 2019 11:05) (13 - 35)  SpO2: 94% (31 Dec 2019 11:05) (77% - 100%)    General Appearance: NAD, awake, alert, seen sitting in chair in his room.      Procedure Site (Left chest tube): catheter intact and draining to pleur-evac with wall suction.  no air leak noted.  d/w covering nurse to change dressing around the chest tube site.     I&O's Detail    OUT:    Left pigtail Chest Tube: 12/31/19 7AM - 2240 mL     LABS:                        8.1    13.97 )-----------( 314      ( 31 Dec 2019 06:46 )             26.1     12-31    135  |  98  |  21  ----------------------------<  113<H>  3.9   |  28  |  0.51    Ca    7.7<L>      31 Dec 2019 00:56  Phos  3.6     12-31  Mg     1.9     12-31    TPro  5.6<L>  /  Alb  2.8<L>  /  TBili  0.3  /  DBili  0.2  /  AST  9<L>  /  ALT  14  /  AlkPhos  80  12-31    Culture - Body Fluid with Gram Stain (12.30.19 @ 19:16)    Gram Stain:   No polymorphonuclear cells seen  No organisms seen  by cytocentrifuge    Specimen Source: .Body Fluid Pleural Fluid    < from: Xray Chest 1 View- PORTABLE-Urgent (12.30.19 @ 20:44) >  IMPRESSION:   Interval placement of a left chest tube with resolution of left pleural effusion.   Trace right pleural effusion.   No pneumothorax.    < end of copied text >        A/P  74y Male with h/o left pleural effusion S/P left pigtail chest tube placement on 12/30/19    Continue care as per SICU/primary team.  Monitor and record outputs from the Left pigtail chest tube.  Serial CXR while the pigtail chest tube is in.     Will discuss with IR attending Dr. James.    Wicho Leonardo, ESTEBAN-C  [facility] [id number]  Ext [phone]

## 2019-12-31 NOTE — CHART NOTE - NSCHARTNOTEFT_GEN_A_CORE
Nutrition Follow Up Note  Patient seen for: TPN/Malnutrition follow up    Chart reviewed, events noted. "73 y/o M presenting with septic shock and high grade SBO s/p exploratory laparotomy, lysis of adhesions, decompression of bowel via enterotomy w/ primary repair, and Abthera VAC placement on ; s/p take down of Abthera, washout and re-application of the Abthera vac on . Now s/p SBR and end ileostomy on 12/10.". Interim events: Pt s/p chest tube placement by IR for drainage of left pleural effusion on . Received 1L crystalloid and 1L of albumin for intermittent hypotension. Pt continues on antibiotics for wound infection. Pt s/p FEES ; full report with recommendations pending. Pt continues on anti-motility agents (Lomotil, Opium Tincture, Imodium and Octreotide) in context of hx of high ostomy output.     Source: RN, comprehensive chart review, TPN rounds     Diet: Dysphagia 1 Pureed-Nectar Consistency Fluid    Per discussion with pt, endorsed fair-good PO intake of pureed diet when applicable (pt reports dislike of pureed consistency, however able to acknowledge risks/benefits r/t safety purposes and risk for aspiration). Pt s/p FEES (), pending results. Pt denies nausea, vomiting and abdominal pain at time of RD visit. TPN to continue at full-strength. Ostomy output appears to be improving (see below).     Total Volume of TPN: 1.8L at 75ml/hr. Presumed TPN (ordered ): (120Gm amino acids, 210Gm dextrose, 50Gm SMOF lipids). To provide: 1694kcal (31Kcal/Kg and 2.2Gm protein/Kg dosing wt 54.2Kg); with 10ml MVI and 3ml MTE-5. NO insulin provided in TPN bag at this time.     Non-Protein Calories: 1214kcal/day (22kcal/Kg)  Dextrose Infusion Rate: 2.7 mg/Kg/min  Lipid Infusion Rate: 0.92 Gm/Kg/day; .08 Gm/Kg/hr    Ostomy output (per nursing flow sheet):  (): 1200ml (thus far)  (): 580ml   (): 2650ml   (): 2350ml     Chest tube output (per nursing flow sheet):  (): 640ml   (): 1600ml     Daily Weight in k.3 (-), Weight in k.3 (-), Weight in k.7 (-), Weight in k.3 (-), Weight in k.3 (-), Weight in k.6 (-), Weight in k.3 (-)  % Weight Change    Pertinent Medications: MEDICATIONS  (STANDING):  acyclovir   Oral Tab/Cap 400 milliGRAM(s) Oral three times a day  albuterol/ipratropium for Nebulization 3 milliLiter(s) Nebulizer every 6 hours  buDESOnide    Inhalation Suspension 0.5 milliGRAM(s) Inhalation every 12 hours  ceFAZolin   IVPB      ceFAZolin   IVPB 1000 milliGRAM(s) IV Intermittent every 8 hours  chlorhexidine 2% Cloths 1 Application(s) Topical <User Schedule>  diphenoxylate/atropine 2 Tablet(s) Oral <User Schedule>  enoxaparin Injectable 40 milliGRAM(s) SubCutaneous daily  fat emulsion (Fish Oil and Plant Based) 20% Infusion 20.8 mL/Hr (20.8 mL/Hr) IV Continuous <Continuous>  insulin lispro (HumaLOG) corrective regimen sliding scale   SubCutaneous three times a day before meals  insulin lispro (HumaLOG) corrective regimen sliding scale   SubCutaneous at bedtime  loperamide 16 milliGRAM(s) Oral <User Schedule>  octreotide  Injectable 100 MICROGram(s) SubCutaneous three times a day  opium Tincture 6 milliGRAM(s) Oral <User Schedule>  pantoprazole  Injectable 40 milliGRAM(s) IV Push daily  Parenteral Nutrition - Adult 1 Each (75 mL/Hr) TPN Continuous <Continuous>  Parenteral Nutrition - Adult 1 Each (75 mL/Hr) TPN Continuous <Continuous>  sodium chloride 0.9%. 1000 milliLiter(s) (10 mL/Hr) IV Continuous <Continuous>    MEDICATIONS  (PRN):  ondansetron Injectable 4 milliGRAM(s) IV Push every 6 hours PRN Nausea and/or Vomiting    Pertinent Labs:  @ 00:56: Na 135, BUN 21, Cr 0.51, <H>, K+ 3.9, Phos 3.6, Mg 1.9, Alk Phos 80, ALT/SGPT 14, AST/SGOT 9<L>, HbA1c --   @ 18:26: Na 134<L>, BUN 22, Cr 0.48<L>, <H>, K+ 4.7, Phos 4.0, Mg 2.0, Alk Phos 100, ALT/SGPT 17, AST/SGOT 15, HbA1c --    Finger Sticks:  POCT Blood Glucose.: 109 mg/dL ( @ 07:42)  POCT Blood Glucose.: 139 mg/dL ( @ 22:35)  POCT Blood Glucose.: 145 mg/dL ( @ 16:58)      Skin per nursing documentation: surgical incision midline abdomen  Edema: 1+ generalized     Estimated Needs:   [x] no change since previous assessment  [ ] recalculated:     Previous Nutrition Diagnosis: severe malnutrition  Nutrition Diagnosis is: remains appropriate with provision of TPN    Interventions:     Recommend  1) TPN per TPN Team/Nutrition assessment  2) PO diet as per discretion of medical team; consistency as per medical team, SLP. Consider oral supplement (Ensure Enlive vs Ensure Pudding; pending appropriate diet/fluid consistency).     Monitoring and Evaluation:     Continue to monitor Nutritional intake, Tolerance to diet prescription, weights, labs, skin integrity    RD remains available upon request and will follow up per protocol Nutrition Follow Up Note  Patient seen for: TPN/Malnutrition follow up    Chart reviewed, events noted. "75 y/o M presenting with septic shock and high grade SBO s/p exploratory laparotomy, lysis of adhesions, decompression of bowel via enterotomy w/ primary repair, and Abthera VAC placement on ; s/p take down of Abthera, washout and re-application of the Abthera vac on . Now s/p SBR and end ileostomy on 12/10.". Interim events: Pt s/p chest tube placement by IR for drainage of left pleural effusion on . Received 1L crystalloid and 1L of albumin for intermittent hypotension. Pt continues on antibiotics for wound infection. Pt s/p FEES ; full report with recommendations pending. Pt continues on anti-motility agents (Lomotil, Opium Tincture, Imodium and Octreotide) in context of hx of high ostomy output.     Source: RN, comprehensive chart review, TPN rounds     Diet: Dysphagia 1 Pureed-Nectar Consistency Fluid    Per discussion with pt, endorsed fair-good PO intake of pureed diet when applicable (pt reports dislike of pureed consistency, however able to acknowledge risks/benefits r/t safety purposes and risk for aspiration). Pt pending FEES (). Pt denies nausea, vomiting and abdominal pain at time of RD visit. TPN to continue at full-strength. Ostomy output appears to be improving (see below).     Total Volume of TPN: 1.8L at 75ml/hr. Presumed TPN (ordered ): (120Gm amino acids, 210Gm dextrose, 50Gm SMOF lipids). To provide: 1694kcal (31Kcal/Kg and 2.2Gm protein/Kg dosing wt 54.2Kg); with 10ml MVI and 3ml MTE-5. NO insulin provided in TPN bag at this time.     Non-Protein Calories: 1214kcal/day (22kcal/Kg)  Dextrose Infusion Rate: 2.7 mg/Kg/min  Lipid Infusion Rate: 0.92 Gm/Kg/day; .08 Gm/Kg/hr    Ostomy output (per nursing flow sheet):  (): 1200ml (thus far)  (): 580ml   (): 2650ml   (): 2350ml     Chest tube output (per nursing flow sheet):  (): 640ml   (): 1600ml     Daily Weight in k.3 (-), Weight in k.3 (-30), Weight in k.7 (-), Weight in k.3 (-), Weight in k.3 (-27), Weight in k.6 (-), Weight in k.3 (-25)  % Weight Change    Pertinent Medications: MEDICATIONS  (STANDING):  acyclovir   Oral Tab/Cap 400 milliGRAM(s) Oral three times a day  albuterol/ipratropium for Nebulization 3 milliLiter(s) Nebulizer every 6 hours  buDESOnide    Inhalation Suspension 0.5 milliGRAM(s) Inhalation every 12 hours  ceFAZolin   IVPB      ceFAZolin   IVPB 1000 milliGRAM(s) IV Intermittent every 8 hours  chlorhexidine 2% Cloths 1 Application(s) Topical <User Schedule>  diphenoxylate/atropine 2 Tablet(s) Oral <User Schedule>  enoxaparin Injectable 40 milliGRAM(s) SubCutaneous daily  fat emulsion (Fish Oil and Plant Based) 20% Infusion 20.8 mL/Hr (20.8 mL/Hr) IV Continuous <Continuous>  insulin lispro (HumaLOG) corrective regimen sliding scale   SubCutaneous three times a day before meals  insulin lispro (HumaLOG) corrective regimen sliding scale   SubCutaneous at bedtime  loperamide 16 milliGRAM(s) Oral <User Schedule>  octreotide  Injectable 100 MICROGram(s) SubCutaneous three times a day  opium Tincture 6 milliGRAM(s) Oral <User Schedule>  pantoprazole  Injectable 40 milliGRAM(s) IV Push daily  Parenteral Nutrition - Adult 1 Each (75 mL/Hr) TPN Continuous <Continuous>  Parenteral Nutrition - Adult 1 Each (75 mL/Hr) TPN Continuous <Continuous>  sodium chloride 0.9%. 1000 milliLiter(s) (10 mL/Hr) IV Continuous <Continuous>    MEDICATIONS  (PRN):  ondansetron Injectable 4 milliGRAM(s) IV Push every 6 hours PRN Nausea and/or Vomiting    Pertinent Labs:  @ 00:56: Na 135, BUN 21, Cr 0.51, <H>, K+ 3.9, Phos 3.6, Mg 1.9, Alk Phos 80, ALT/SGPT 14, AST/SGOT 9<L>, HbA1c --   @ 18:26: Na 134<L>, BUN 22, Cr 0.48<L>, <H>, K+ 4.7, Phos 4.0, Mg 2.0, Alk Phos 100, ALT/SGPT 17, AST/SGOT 15, HbA1c --    Finger Sticks:  POCT Blood Glucose.: 109 mg/dL ( @ 07:42)  POCT Blood Glucose.: 139 mg/dL ( @ 22:35)  POCT Blood Glucose.: 145 mg/dL ( @ 16:58)      Skin per nursing documentation: surgical incision midline abdomen  Edema: 1+ generalized     Estimated Needs:   [x] no change since previous assessment  [ ] recalculated:     Previous Nutrition Diagnosis: severe malnutrition  Nutrition Diagnosis is: remains appropriate with provision of TPN    Interventions:     Recommend  1) TPN per TPN Team/Nutrition assessment  2) PO diet as per discretion of medical team; consistency as per medical team, SLP. Consider oral supplement (Ensure Enlive vs Ensure Pudding; pending appropriate diet/fluid consistency).     Monitoring and Evaluation:     Continue to monitor Nutritional intake, Tolerance to diet prescription, weights, labs, skin integrity    RD remains available upon request and will follow up per protocol

## 2019-12-31 NOTE — PROGRESS NOTE ADULT - SUBJECTIVE AND OBJECTIVE BOX
Interventional Radiology Follow- Up Note      74y Male s/p left chest tube insertion on 12/30/19 in Interventional Radiology with Dr. James.     Patient seen and examined @ bedside. High output of 2240 cc output. Cx results negative.   No complaints offered.    Vitals: T(F): 98.1 (12-31-19 @ 03:00), Max: 99.3 (12-30-19 @ 11:00)  HR: 72 (12-31-19 @ 07:00) (72 - 96)  BP: 87/52 (12-31-19 @ 07:00) (71/44 - 136/60)  RR: 27 (12-31-19 @ 07:00) (13 - 35)  SpO2: 100% (12-31-19 @ 07:00) (77% - 100%)  Wt(kg): --    LABS:                        8.1    13.97 )-----------( 314      ( 31 Dec 2019 06:46 )             26.1     12-31    135  |  98  |  21  ----------------------------<  113<H>  3.9   |  28  |  0.51    Ca    7.7<L>      31 Dec 2019 00:56  Phos  3.6     12-31  Mg     1.9     12-31    TPro  5.6<L>  /  Alb  2.8<L>  /  TBili  0.3  /  DBili  0.2  /  AST  9<L>  /  ALT  14  /  AlkPhos  80  12-31      I&O's Detail    30 Dec 2019 07:01  -  31 Dec 2019 07:00  --------------------------------------------------------  IN:    Albumin 5%  - 250 mL: 1000 mL    fat emulsion (Fish Oil and Plant Based) 20% Infusion: 249.6 mL    Packed Red Blood Cells: 360 mL    Sodium Chloride 0.9% IV Bolus: 1000 mL    sodium chloride 0.9%.: 700 mL    Solution: 115 mL    Solution: 50 mL    TPN (Total Parenteral Nutrition): 1800 mL  Total IN: 5274.6 mL    OUT:    Chest Tube: 2240 mL    Ileostomy: 1000 mL    Voided: 1750 mL  Total OUT: 4990 mL    Total NET: 284.6 mL    PHYSICAL EXAM:  General: Nontoxic, in NAD  Neuro:  Alert & oriented   Extremities: no pedal edema    Impression: 74yM s/p left chest tube insertion on 12/30/19 in Interventional Radiology with Dr. James.    PMH COPD with hypoxia  ETOHism  ETOH abuse    Plan:  -Continue to monitor chest tube output  -Trend vitals, labs  -will discuss with IR attending     Please call IR at extension [phone] with any questions, concerns, or issues regarding above.

## 2019-12-31 NOTE — PROGRESS NOTE ADULT - SUBJECTIVE AND OBJECTIVE BOX
Patient is a 74y old  Male who presents with a chief complaint of abd. pain (31 Dec 2019 09:43)      SUBJECTIVE: "I am feeling okay, I am getting up to the chair "    Vital Signs Last 24 Hrs  T(C): 36.9 (19 @ 07:00), Max: 36.9 (19 @ 23:00)  T(F): 98.4 (19 @ 07:00), Max: 98.4 (19 @ 23:00)  HR: 80 (19 @ 10:00) (72 - 96)  BP: 111/57 (19 @ 10:00) (71/44 - 136/60)  RR: 29 (19 @ 10:00) (13 - 35)  SpO2: 95% (19 @ 10:00) (77% - 100%)                19 @ 07:01  -  19 @ 07:00  --------------------------------------------------------  IN: 5274.6 mL / OUT: 4990 mL / NET: 284.6 mL    19 @ 07:01  -  19 @ 11:05  --------------------------------------------------------  IN: 255 mL / OUT: 200 mL / NET: 55 mL        Daily     Daily Weight in k.3 (31 Dec 2019 00:55)                          8.1    13.97 )-----------( 314      ( 31 Dec 2019 06:46 )             26.1         135  |  98  |  21  ----------------------------<  113<H>  3.9   |  28  |  0.51    Ca    7.7<L>      31 Dec 2019 00:56  Phos  3.6       Mg     1.9         TPro  5.6<L>  /  Alb  2.8<L>  /  TBili  0.3  /  DBili  0.2  /  AST  9<L>  /  ALT  14  /  AlkPhos  80            PHYSICAL EXAM  Neurology: A&Ox3, NAD, no gross deficits  CV : RRR+S1S2  Lungs: Respirations non-labored, B/L BS + 2L NC O2 O2 sat 95%  Abdomen: Soft, NT/ND, +BSx4Q + ileostomy +TPN  Extremities: B/L LE edema, negative calf tenderness, +PP           CHEST TUBES:  Left CT pigtail    [x  ]LWS  [  ]H2O seal drain 950 ml overnight/ 2 Liters total 24/hrs                MEDICATIONS  acyclovir   Oral Tab/Cap 400 milliGRAM(s) Oral three times a day  albuterol/ipratropium for Nebulization 3 milliLiter(s) Nebulizer every 6 hours  buDESOnide    Inhalation Suspension 0.5 milliGRAM(s) Inhalation every 12 hours  ceFAZolin   IVPB      ceFAZolin   IVPB 1000 milliGRAM(s) IV Intermittent every 8 hours  chlorhexidine 2% Cloths 1 Application(s) Topical <User Schedule>  diphenoxylate/atropine 2 Tablet(s) Oral <User Schedule>  enoxaparin Injectable 40 milliGRAM(s) SubCutaneous daily  fat emulsion (Fish Oil and Plant Based) 20% Infusion 20.8 mL/Hr IV Continuous <Continuous>  insulin lispro (HumaLOG) corrective regimen sliding scale   SubCutaneous three times a day before meals  insulin lispro (HumaLOG) corrective regimen sliding scale   SubCutaneous at bedtime  loperamide 16 milliGRAM(s) Oral <User Schedule>  octreotide  Injectable 100 MICROGram(s) SubCutaneous three times a day  ondansetron Injectable 4 milliGRAM(s) IV Push every 6 hours PRN  opium Tincture 6 milliGRAM(s) Oral <User Schedule>  pantoprazole  Injectable 40 milliGRAM(s) IV Push daily  Parenteral Nutrition - Adult 1 Each TPN Continuous <Continuous>  sodium chloride 0.9%. 1000 milliLiter(s) IV Continuous <Continuous>

## 2019-12-31 NOTE — PROGRESS NOTE ADULT - ASSESSMENT
73 y/o M presenting with septic shock and high grade SBO s/p exploratory laparotomy, lysis of adhesions, decompression of bowel via enterotomy w/ primary repair, and Abthera VAC placement on 12/6; s/p take down of Abthera, washout and re-application of the Abthera vac on 12/8. Now s/p SBR and end ileostomy on 12/10 acute respiratory distress now improving, Pneumothorax, hyperglycemia, delirium. Now still with persistent pneumothorax when chest pigtail clamped.   12/23 VSS on room air recommending IR drainage of left chest  12/24 No evidence of air leak to left chest tube. Tube clamped. Repeat chest cxr in 4 hours. Anticipate chest tube removal if lung remains expanded. Drainage of loculated left effusion discussed with IR. IR to reconsult for drainage once initial tube removed. Discussed plan with Dr Mayes and IR Fellow  12/24  On NC ,    VSSchest xray sm lt pl eff, left chest tube dc'd.  12/26    2l NC   co  sob,  lt side diminshed  12/27 VSS, CXR with unchanged loculated left pleural effusion- IR consulted for pigtail placement, states effusion can be drained via thoracentesis, pigtail placement not indicated at this time - Dr. Mayes to speak with IR attending.   12/30 Patient with persistent PTX  and left pleural effusion now for IR drainage with Dr Elkins.   12/31 HD stable, L PTC w/ high output, serosanguinous drainage, 990 ml overnight/ 2200 in 24 hrs. Tolerating 2L NC supplemental O2, Cyto pending, continue monitor drainage output.

## 2019-12-31 NOTE — PROGRESS NOTE ADULT - SUBJECTIVE AND OBJECTIVE BOX
GENERAL SURGERY PROGRESS NOTE    SUBJECTIVE  Patient seen and examined.   s/p IR L chest tube placement 12/30, hypotensive s/p albumin 250cc 5%. 70cc pleural fluid drained, 1.25L overnight drained.  Reports tolerating diet without nausea, vomiting, +/+ ostomy. Denies fever, chills, SOB, chest pain.         OBJECTIVE    PHYSICAL EXAM  General: NAD  CHEST: receiving duoneb treatment, labored breathing, L chest tube in place with forced expiratory leak  CV: appears well perfused  Abdomen: soft, nontender, nondistended, no rebound or guarding, ostomy +/+, midline wound pink and less erythematous compared to 12/28 with no purulent drainage or odor  Extremities: Grossly symmetric    T(C): 36.9 (12-31-19 @ 07:00), Max: 37.4 (12-30-19 @ 11:00)  HR: 74 (12-31-19 @ 09:00) (72 - 96)  BP: 103/55 (12-31-19 @ 09:00) (71/44 - 136/60)  RR: 32 (12-31-19 @ 09:00) (13 - 35)  SpO2: 96% (12-31-19 @ 09:00) (77% - 100%)    12-30-19 @ 07:01  -  12-31-19 @ 07:00  --------------------------------------------------------  IN: 5274.6 mL / OUT: 4990 mL / NET: 284.6 mL    12-31-19 @ 07:01  -  12-31-19 @ 09:51  --------------------------------------------------------  IN: 170 mL / OUT: 100 mL / NET: 70 mL        MEDICATIONS  acyclovir   Oral Tab/Cap 400 milliGRAM(s) Oral three times a day  albuterol/ipratropium for Nebulization 3 milliLiter(s) Nebulizer every 6 hours  buDESOnide    Inhalation Suspension 0.5 milliGRAM(s) Inhalation every 12 hours  ceFAZolin   IVPB      ceFAZolin   IVPB 1000 milliGRAM(s) IV Intermittent every 8 hours  chlorhexidine 2% Cloths 1 Application(s) Topical <User Schedule>  diphenoxylate/atropine 2 Tablet(s) Oral <User Schedule>  enoxaparin Injectable 40 milliGRAM(s) SubCutaneous daily  fat emulsion (Fish Oil and Plant Based) 20% Infusion 20.8 mL/Hr IV Continuous <Continuous>  insulin lispro (HumaLOG) corrective regimen sliding scale   SubCutaneous three times a day before meals  insulin lispro (HumaLOG) corrective regimen sliding scale   SubCutaneous at bedtime  loperamide 16 milliGRAM(s) Oral <User Schedule>  octreotide  Injectable 100 MICROGram(s) SubCutaneous three times a day  ondansetron Injectable 4 milliGRAM(s) IV Push every 6 hours PRN  opium Tincture 6 milliGRAM(s) Oral <User Schedule>  pantoprazole  Injectable 40 milliGRAM(s) IV Push daily  Parenteral Nutrition - Adult 1 Each TPN Continuous <Continuous>  sodium chloride 0.9%. 1000 milliLiter(s) IV Continuous <Continuous>      LABS                        8.1    13.97 )-----------( 314      ( 31 Dec 2019 06:46 )             26.1     12-31    135  |  98  |  21  ----------------------------<  113<H>  3.9   |  28  |  0.51    Ca    7.7<L>      31 Dec 2019 00:56  Phos  3.6     12-31  Mg     1.9     12-31    TPro  5.6<L>  /  Alb  2.8<L>  /  TBili  0.3  /  DBili  0.2  /  AST  9<L>  /  ALT  14  /  AlkPhos  80  12-31          RADIOLOGY & ADDITIONAL STUDIES GENERAL SURGERY PROGRESS NOTE    SUBJECTIVE  Patient seen and examined.   s/p IR L chest tube placement 12/30, hypotensive s/p albumin 250cc 5%, PRBC x 1u, crystalloid. 70cc pleural fluid drained, 1.25L overnight.  Reports tolerating diet without nausea, vomiting, +/+ ostomy. Denies fever, chills, SOB, chest pain.         OBJECTIVE    PHYSICAL EXAM  General: NAD  CHEST: receiving duoneb treatment, labored breathing, L chest tube in place with forced expiratory leak  CV: appears well perfused  Abdomen: soft, nontender, nondistended, no rebound or guarding, ostomy +/+, midline wound pink and less erythematous compared to 12/28 with no purulent drainage or odor  Extremities: Grossly symmetric    T(C): 36.9 (12-31-19 @ 07:00), Max: 37.4 (12-30-19 @ 11:00)  HR: 74 (12-31-19 @ 09:00) (72 - 96)  BP: 103/55 (12-31-19 @ 09:00) (71/44 - 136/60)  RR: 32 (12-31-19 @ 09:00) (13 - 35)  SpO2: 96% (12-31-19 @ 09:00) (77% - 100%)    12-30-19 @ 07:01  -  12-31-19 @ 07:00  --------------------------------------------------------  IN: 5274.6 mL / OUT: 4990 mL / NET: 284.6 mL    12-31-19 @ 07:01  -  12-31-19 @ 09:51  --------------------------------------------------------  IN: 170 mL / OUT: 100 mL / NET: 70 mL        MEDICATIONS  acyclovir   Oral Tab/Cap 400 milliGRAM(s) Oral three times a day  albuterol/ipratropium for Nebulization 3 milliLiter(s) Nebulizer every 6 hours  buDESOnide    Inhalation Suspension 0.5 milliGRAM(s) Inhalation every 12 hours  ceFAZolin   IVPB      ceFAZolin   IVPB 1000 milliGRAM(s) IV Intermittent every 8 hours  chlorhexidine 2% Cloths 1 Application(s) Topical <User Schedule>  diphenoxylate/atropine 2 Tablet(s) Oral <User Schedule>  enoxaparin Injectable 40 milliGRAM(s) SubCutaneous daily  fat emulsion (Fish Oil and Plant Based) 20% Infusion 20.8 mL/Hr IV Continuous <Continuous>  insulin lispro (HumaLOG) corrective regimen sliding scale   SubCutaneous three times a day before meals  insulin lispro (HumaLOG) corrective regimen sliding scale   SubCutaneous at bedtime  loperamide 16 milliGRAM(s) Oral <User Schedule>  octreotide  Injectable 100 MICROGram(s) SubCutaneous three times a day  ondansetron Injectable 4 milliGRAM(s) IV Push every 6 hours PRN  opium Tincture 6 milliGRAM(s) Oral <User Schedule>  pantoprazole  Injectable 40 milliGRAM(s) IV Push daily  Parenteral Nutrition - Adult 1 Each TPN Continuous <Continuous>  sodium chloride 0.9%. 1000 milliLiter(s) IV Continuous <Continuous>      LABS                        8.1    13.97 )-----------( 314      ( 31 Dec 2019 06:46 )             26.1     12-31    135  |  98  |  21  ----------------------------<  113<H>  3.9   |  28  |  0.51    Ca    7.7<L>      31 Dec 2019 00:56  Phos  3.6     12-31  Mg     1.9     12-31    TPro  5.6<L>  /  Alb  2.8<L>  /  TBili  0.3  /  DBili  0.2  /  AST  9<L>  /  ALT  14  /  AlkPhos  80  12-31          RADIOLOGY & ADDITIONAL STUDIES

## 2019-12-31 NOTE — PROGRESS NOTE ADULT - ASSESSMENT
A/P: 74 year old male w/ PMH of COPD and EtOH dependence with PSH/o appy, prostate surgery, & spine surgery  septic shock and high grade SBO s/p exploratory laparotomy, lysis of adhesions, decompression of bowel via enterotomy w/ primary repair, and Abthera VAC placement on 12/6; s/p take down of Abthera, washout and re-application of the Abthera vac on 12/8. Now s/p SBR and end ileostomy on 12/10.   TPN consulted to assist w/ management of pt's nutrition in pt w/ prolonged hospital course now tolerating diet but has high Ileostomy output     Continue TPN at full strength in pt w/ severe Protein-Calorie Malnutrition- improving high ostomy output  Tolerating Dysphagia 1 Pureed-Nectar Consistency Fluid     TPN GOAL:  120 grams amino acids (800ml 15% a.a.)  210 grams dextrose (300ml 70% dex)  50 grams SMOFlipid (250ml 20%IVFE @ 20.8ml/hr x 12 hours)  in 1800mL volume  Micronutrients: 10ml MVI, 3ml MTE-5    HypoNa  improving & will continue to monitor-  Elevated K -improving w/decreased KCl of 40 mEq KCl in TPN          possibly due to Octreotide  HypoPhos resolving, continue NaPhos of 45mMol   Strict Intake and Output-       high ileostomy output - greatly improved       pt now on increased lomotil, imodium, tincture of opium, & Octreotide        HypoTn that improved w/ IVF after high drainage from Lt chest effusion  Consider sending ostomy output for electrolyte evaluation or fecal fat testing to see what pt is absorbing        to assess ongoing needs in pt with concerns for Short Gut Syndrome  Hyperglycemia - improving - Insulin removed from TPN      Fingersticks & ISS coverage - to be ordered by primary team  PICC w/dedicated port for only TPN - maintenance as per protocol  Weights three times a week  Monitor BMP, Mg, Ionized Ca, Phosphorus daily  Continue to monitor Triglycerides and Pre-albumin weekly  Continue as per SICU / Surgery, will follow with you, D/w primary team    Andreina Hubbard PA-C  TPN team, pager [phone]  D/w Ana M Siegel A/P: 74 year old male w/ PMH of COPD and EtOH dependence with PSH/o appy, prostate surgery, & spine surgery  septic shock and high grade SBO s/p exploratory laparotomy, lysis of adhesions, decompression of bowel via enterotomy w/ primary repair, and Abthera VAC placement on 12/6; s/p take down of Abthera, washout and re-application of the Abthera vac on 12/8. Now s/p SBR and end ileostomy on 12/10.   TPN consulted to assist w/ management of pt's nutrition in pt w/ prolonged hospital course now tolerating diet but has high Ileostomy output     Continue TPN at full strength in pt w/ severe Protein-Calorie Malnutrition- improving high ostomy output  Tolerating Dysphagia 1 Pureed-Nectar Consistency Fluid     TPN GOAL:  120 grams amino acids (800ml 15% a.a.)  210 grams dextrose (300ml 70% dex)  50 grams SMOFlipid (250ml 20%IVFE @ 20.8ml/hr x 12 hours)  in 1800mL volume  Micronutrients: 10ml MVI, 3ml MTE-5    HypoNa  improving & will continue to monitor-maintaining K levels will help improve Na  Elevated K -improving w/decreased KCl of 40 mEq KCl in TPN          possibly due to Octreotide  HypoPhos resolving, continue NaPhos of 45mMol   Strict Intake and Output-       high ileostomy output - greatly improved       pt now on increased lomotil, imodium, tincture of opium, & Octreotide        HypoTn that improved w/ IVF after high drainage from Lt chest effusion  Consider sending ostomy output for electrolyte evaluation or fecal fat testing to see what pt is absorbing        to assess ongoing needs in pt with concerns for Short Gut Syndrome  Hyperglycemia - improving - Insulin removed from TPN      Fingersticks & ISS coverage - to be ordered by primary team  PICC w/dedicated port for only TPN - maintenance as per protocol  Weights three times a week  Monitor BMP, Mg, Ionized Ca, Phosphorus daily  Continue to monitor Triglycerides and Pre-albumin weekly  Continue as per SICU / Surgery, will follow with you, D/w primary team    Andreina Hubbard PA-C  TPN team, pager [phone]  D/w Ana M Siegel A/P: 74 year old male w/ PMH of COPD and EtOH dependence with PSH/o appy, prostate surgery, & spine surgery  septic shock and high grade SBO s/p exploratory laparotomy, lysis of adhesions, decompression of bowel via enterotomy w/ primary repair, and Abthera VAC placement on 12/6; s/p take down of Abthera, washout and re-application of the Abthera vac on 12/8. Now s/p SBR and end ileostomy on 12/10.   TPN consulted to assist w/ management of pt's nutrition in pt w/ prolonged hospital course now tolerating diet but has high Ileostomy output     Continue TPN at full strength in pt w/ severe Protein-Calorie Malnutrition- improving high ostomy output  Tolerating Dysphagia 1 Pureed-Nectar Consistency Fluid     TPN GOAL:  120 grams amino acids (800ml 15% a.a.)  210 grams dextrose (300ml 70% dex)  50 grams SMOFlipid (250ml 20%IVFE @ 20.8ml/hr x 12 hours)  in 1800mL volume  Micronutrients: 10ml MVI, 3ml MTE-5    HypoNa  improving & will continue to monitor-maintaining K levels will help improve Na  Elevated K -improving w/decreased KCl of 40 mEq KCl in TPN          possibly due to Octreotide  HypoPhos resolving, continue NaPhos of 45mMol   Strict Intake and Output-       high ileostomy output - greatly improved       pt now on increased lomotil, imodium, tincture of opium, & Octreotide        HypoTn that improved w/ IVF after high drainage from Lt chest effusion  Consider sending ostomy output for electrolyte evaluation or fecal fat testing to see what pt is absorbing        to assess ongoing needs in pt with concerns for Short Gut Syndrome  Hyperglycemia - improving - Insulin removed from TPN      Fingersticks & ISS coverage - to be ordered by primary team  Ancef for incision cellulitis improving  PICC w/dedicated port for only TPN - maintenance as per protocol  Weights three times a week  Monitor BMP, Mg, Ionized Ca, Phosphorus daily  Continue to monitor Triglycerides and Pre-albumin weekly  Continue as per SICU / Surgery, will follow with you, D/w primary team    Andreina Hubbard PA-C  TPN team, pager [phone]  D/w Ana M Siegel

## 2019-12-31 NOTE — SWALLOW FEES ASSESSMENT ADULT - COMMENTS
Pt seen for bedside swallow evaluation on 12/21 with recommendations for dysphagia 1 diet and nectar thickened liquids.  FEES was also recommended at that time to further assess swallow mechanism, but was deferred multiple times due to pt’s NPO status for tentative procedures.  12/24: started on acyclovir for concern for herpetic rash around mouth; pigtail removed. 12/27: CXR with unchanged loculated left pleural effusion -> s/p left chest tube insertion on 12/30/19 in Interventional Radiology. 12/30: pt hypotensive 79/41 -> bolus given.

## 2019-12-31 NOTE — PROGRESS NOTE ADULT - PROBLEM SELECTOR PLAN 1
12/24 left chest tube for ptx dc'd  Left  pleural effusion and PTX  for  IR to place pigtail for drainage.    12/30 s/p IR guided drainage of L pleural effusion  L PTC in place - to Low wall suction  strict Is and Os, monitor chest tube output  Daily serial CXR while chest tube in place  Cytology specimen pending   thoracic surgery will continue to follow  Care as per primary team

## 2019-12-31 NOTE — PROGRESS NOTE ADULT - ATTENDING COMMENTS
Pt seen and examined.  Chart reviewed.  Resident note confirmed.  Pt is a 74 year old male with a medical history signficant for CAD/ETOH abuse who presented to [facility] with septic shock. Work up revealed a high grade SBO with a suggestion of intestinal necrosis. Pt underwent expl lap/intestinal resection/2nd look lap/ileostomy. Ileostomy output improved with multimodal therapy. Pt continues on volume resusc and multimodal tx for high ileost output. Pt received 1U PRBC overnight.    PMH/PSH/MEDS/ALL/SH/FH/ROS:  Unchanged from H&P above  Vitals/PE/Labs/Radiographic data:  Reviewed     A/P  Neuro:  Continue Pain control  	Continue supportive care		    CVS:	Alcoholic cardiomyopathy  	Troponin elevated  	Repeat Echocardiogram  	Cardiology consult  	Monitor vitals    Pulm:  	Atelectasis  	S/p ptx/pigtail   	Continue ISP    GI:	Intestinal necrosis, s/p rsn/ileostomy  	CT abd reviewed. It reveals a seroma  	Continue aspiration precaution diet  	Continue nocturnal tube feeds  	FEES today    :  	S/p CHI  	Monitor I’s and O’s    Heme:   acute blood loss anemia  	Monitor H/h    ID: 	Leukocytosis  	Culture for fever   	cellulitis, continue ancef until 1/2.

## 2019-12-31 NOTE — PROGRESS NOTE ADULT - SUBJECTIVE AND OBJECTIVE BOX
[facility] NUTRITION SUPPORT / TPN -- FOLLOW UP NOTE  --------------------------------------------------------------------------------    24 hour events/subjective:  Pt had Lt chest Pigtail placed ->2240mL  Pt refused FEES today- overwhelmed today- planned for Thursday 1/2  Ancef for incisional cellulitis  NPO/ TPN- dysphagia to restart  some SOB,  (+) O2NC improved  Pt denies any pain/ dyspnea/ cough/ palp  no n/v   Tolerating Dysphagia Diet  Decreased Ileostomy output noted- 1L for last 24h       Continue Octreotide, lomotil, Imodium, & tincture of opium        PPI to help decrease gastric secretions  Hypotension after pleural effusion improved w/ fluid repletion   No f/c/s      Diet:  Diet, Dysphagia 1 Pureed-Nectar Consistency Fluid (12-25-19 @ 15:39)      Appetite: [  ]Poor [ x ]Adequate [  ]Good  Caloric intake:  [x   ]  Adequate   [   ] Inadequate    ROS: General/ GI see HPI  all other systems negative      ALLERGIES & MEDICATIONS  --------------------------------------------------------------------------------  ALLERGIES  IV Contrast (Hives)      STANDING INPATIENT MEDICATIONS    acyclovir   Oral Tab/Cap 400 milliGRAM(s) Oral three times a day  albuterol/ipratropium for Nebulization 3 milliLiter(s) Nebulizer every 6 hours  buDESOnide    Inhalation Suspension 0.5 milliGRAM(s) Inhalation every 12 hours  ceFAZolin   IVPB 1000 milliGRAM(s) IV Intermittent every 8 hours  chlorhexidine 2% Cloths 1 Application(s) Topical <User Schedule>  diphenoxylate/atropine 2 Tablet(s) Oral <User Schedule>  enoxaparin Injectable 40 milliGRAM(s) SubCutaneous daily  fat emulsion (Fish Oil and Plant Based) 20% Infusion 20.8 mL/Hr IV Continuous <Continuous>  insulin lispro (HumaLOG) corrective regimen sliding scale   SubCutaneous three times a day before meals  insulin lispro (HumaLOG) corrective regimen sliding scale   SubCutaneous at bedtime  loperamide 16 milliGRAM(s) Oral <User Schedule>  octreotide  Injectable 100 MICROGram(s) SubCutaneous three times a day  opium Tincture 6 milliGRAM(s) Oral <User Schedule>  pantoprazole  Injectable 40 milliGRAM(s) IV Push daily  Parenteral Nutrition - Adult 1 Each TPN Continuous <Continuous>  Parenteral Nutrition - Adult 1 Each TPN Continuous <Continuous>  sodium chloride 0.9%. 1000 milliLiter(s) IV Continuous <Continuous>      PRN INPATIENT MEDICATION  ondansetron Injectable 4 milliGRAM(s) IV Push every 6 hours PRN        VITALS/PHYSICAL EXAM  --------------------------------------------------------------------------------  T(C): 36.6 (12-31-19 @ 11:00), Max: 36.9 (12-30-19 @ 23:00)  HR: 80 (12-31-19 @ 13:00) (72 - 95)  BP: 122/58 (12-31-19 @ 13:00) (71/44 - 139/63)  RR: 22 (12-31-19 @ 13:00) (13 - 33)  SpO2: 98% (12-31-19 @ 13:00) (77% - 100%)  Wt(kg): --        12-30-19 @ 07:01  -  12-31-19 @ 07:00  --------------------------------------------------------  IN: 5274.6 mL / OUT: 4990 mL / NET: 284.6 mL    12-31-19 @ 07:01  -  12-31-19 @ 14:25  --------------------------------------------------------  IN: 700 mL / OUT: 1200 mL / NET: -500 mL      PHYSICAL EXAM  --------------------------------------------------------------------------------  	Gen: guarded but stable, A&Ox3  	HEENT: NC/AT, PERRL, supple neck, clear oropharynx, dried ruptured blisters  	GI: (+) BS, softly distended, non tender,                    midline incision w/staples c/d/i w/o s/sx infection                   (+)ostomy pink viable- thick bilious liquid stool like material              MSK: FROM, no contractures nor deformities  	Vascular: Equally Warm, no clubbing, cyanosis, nor edema                        Rt PICC dressing c/d/I   	Neuro: No focal deficits, intact sensation, weakened strength  	Psych: Normal affect and mood  	Skin: Warm, without rashes, good turgor        LABS/ CULTURES/ RADIOLOGY:              8.1    13.97 >-----------<  314      [12-31-19 @ 06:46]              26.1     135  |  98  |  21  ----------------------------<  113      [12-31-19 @ 00:56]  3.9   |  28  |  0.51        Ca     7.7     [12-31-19 @ 00:56]      Mg     1.9     [12-31-19 @ 00:56]      Phos  3.6     [12-31-19 @ 00:56]    TPro  5.6  /  Alb  2.8  /  TBili  0.3  /  DBili  0.2  /  AST  9   /  ALT  14  /  AlkPhos  80  [12-31-19 @ 00:56]    Blood Gas Calcium, Ionized - Venous: 1.13 mmoL/L (12-31-19 @ 02:30)      Glucose, Serum: 113 mg/dL (12-31-19 @ 00:56)  Glucose, Serum: 135 mg/dL (12-30-19 @ 18:26)      CAPILLARY BLOOD GLUCOSE  POCT Blood Glucose.: 105 mg/dL (31 Dec 2019 12:52)  POCT Blood Glucose.: 109 mg/dL (31 Dec 2019 07:42)  POCT Blood Glucose.: 139 mg/dL (30 Dec 2019 22:35)  POCT Blood Glucose.: 145 mg/dL (30 Dec 2019 16:58)    Prealbumin, Serum: 15 mg/dL (12-31-19 @ 03:11)  Prealbumin, Serum: 16 mg/dL (12-30-19 @ 07:53)  Prealbumin, Serum: 13 mg/dL (12-25-19 @ 04:24)  Prealbumin, Serum: 14 mg/dL (12-24-19 @ 02:35)  Prealbumin, Serum: 14 mg/dL (12-23-19 @ 06:28)      Triglycerides, Serum: 39 mg/dL (12.31.19 @ 00:56)  Triglycerides, Serum: 51 mg/dL (12.30.19 @ 01:08)  Triglycerides, Serum: 75 mg/dL (12.25.19 @ 02:29) [facility] NUTRITION SUPPORT / TPN -- FOLLOW UP NOTE  --------------------------------------------------------------------------------    24 hour events/subjective:  Pt had Lt chest Pigtail placed ->2240mL  Pt refused FEES today- overwhelmed today- planned for Thursday 1/2  Ancef for incisional cellulitis  NPO/ TPN- dysphagia to restart  some SOB,  w/O2NC improved  Pt denies any pain/ dyspnea/ cough/ palp  no n/v   Tolerating Dysphagia Diet  Decreased Ileostomy output noted- 1L for last 24h       Continue Octreotide, lomotil, Imodium, & tincture of opium        PPI to help decrease gastric secretions  Hypotension after pleural effusion improved w/ fluid repletion   No f/c/s      Diet:  Diet, Dysphagia 1 Pureed-Nectar Consistency Fluid (12-25-19 @ 15:39)      Appetite: [  ]Poor [ x ]Adequate [  ]Good  Caloric intake:  [x   ]  Adequate   [   ] Inadequate    ROS: General/ GI see HPI  all other systems negative      ALLERGIES & MEDICATIONS  --------------------------------------------------------------------------------  ALLERGIES  IV Contrast (Hives)      STANDING INPATIENT MEDICATIONS    acyclovir   Oral Tab/Cap 400 milliGRAM(s) Oral three times a day  albuterol/ipratropium for Nebulization 3 milliLiter(s) Nebulizer every 6 hours  buDESOnide    Inhalation Suspension 0.5 milliGRAM(s) Inhalation every 12 hours  ceFAZolin   IVPB 1000 milliGRAM(s) IV Intermittent every 8 hours  chlorhexidine 2% Cloths 1 Application(s) Topical <User Schedule>  diphenoxylate/atropine 2 Tablet(s) Oral <User Schedule>  enoxaparin Injectable 40 milliGRAM(s) SubCutaneous daily  fat emulsion (Fish Oil and Plant Based) 20% Infusion 20.8 mL/Hr IV Continuous <Continuous>  insulin lispro (HumaLOG) corrective regimen sliding scale   SubCutaneous three times a day before meals  insulin lispro (HumaLOG) corrective regimen sliding scale   SubCutaneous at bedtime  loperamide 16 milliGRAM(s) Oral <User Schedule>  octreotide  Injectable 100 MICROGram(s) SubCutaneous three times a day  opium Tincture 6 milliGRAM(s) Oral <User Schedule>  pantoprazole  Injectable 40 milliGRAM(s) IV Push daily  Parenteral Nutrition - Adult 1 Each TPN Continuous <Continuous>  Parenteral Nutrition - Adult 1 Each TPN Continuous <Continuous>  sodium chloride 0.9%. 1000 milliLiter(s) IV Continuous <Continuous>      PRN INPATIENT MEDICATION  ondansetron Injectable 4 milliGRAM(s) IV Push every 6 hours PRN        VITALS/PHYSICAL EXAM  --------------------------------------------------------------------------------  T(C): 36.6 (12-31-19 @ 11:00), Max: 36.9 (12-30-19 @ 23:00)  HR: 80 (12-31-19 @ 13:00) (72 - 95)  BP: 122/58 (12-31-19 @ 13:00) (71/44 - 139/63)  RR: 22 (12-31-19 @ 13:00) (13 - 33)  SpO2: 98% (12-31-19 @ 13:00) (77% - 100%)  Wt(kg): --        12-30-19 @ 07:01  -  12-31-19 @ 07:00  --------------------------------------------------------  IN: 5274.6 mL / OUT: 4990 mL / NET: 284.6 mL    12-31-19 @ 07:01  -  12-31-19 @ 14:25  --------------------------------------------------------  IN: 700 mL / OUT: 1200 mL / NET: -500 mL      PHYSICAL EXAM  --------------------------------------------------------------------------------  	Gen: guarded but stable, A&Ox3, NC O2  	HEENT: NC/AT, PERRL, supple neck, clear oropharynx, dried ruptured blisters  	GI: (+) BS, softly distended, non tender                   midline incision w/staples c/d/i, pale pink, blanchable w/o drainage or tenderness                   (+)ostomy pink viable- thick bilious liquid stool like material              MSK: FROM, no contractures nor deformities  	Vascular: Equally Warm, no clubbing, cyanosis, nor edema                        Rt PICC dressing c/d/I   	Neuro: No focal deficits, intact sensation, weakened strength  	Psych: Normal affect and mood  	Skin: Warm, without rashes, good turgor        LABS/ CULTURES/ RADIOLOGY:              8.1    13.97 >-----------<  314      [12-31-19 @ 06:46]              26.1     135  |  98  |  21  ----------------------------<  113      [12-31-19 @ 00:56]  3.9   |  28  |  0.51        Ca     7.7     [12-31-19 @ 00:56]      Mg     1.9     [12-31-19 @ 00:56]      Phos  3.6     [12-31-19 @ 00:56]    TPro  5.6  /  Alb  2.8  /  TBili  0.3  /  DBili  0.2  /  AST  9   /  ALT  14  /  AlkPhos  80  [12-31-19 @ 00:56]    Blood Gas Calcium, Ionized - Venous: 1.13 mmoL/L (12-31-19 @ 02:30)      Glucose, Serum: 113 mg/dL (12-31-19 @ 00:56)  Glucose, Serum: 135 mg/dL (12-30-19 @ 18:26)      CAPILLARY BLOOD GLUCOSE  POCT Blood Glucose.: 105 mg/dL (31 Dec 2019 12:52)  POCT Blood Glucose.: 109 mg/dL (31 Dec 2019 07:42)  POCT Blood Glucose.: 139 mg/dL (30 Dec 2019 22:35)  POCT Blood Glucose.: 145 mg/dL (30 Dec 2019 16:58)    Prealbumin, Serum: 15 mg/dL (12-31-19 @ 03:11)  Prealbumin, Serum: 16 mg/dL (12-30-19 @ 07:53)  Prealbumin, Serum: 13 mg/dL (12-25-19 @ 04:24)  Prealbumin, Serum: 14 mg/dL (12-24-19 @ 02:35)  Prealbumin, Serum: 14 mg/dL (12-23-19 @ 06:28)      Triglycerides, Serum: 39 mg/dL (12.31.19 @ 00:56)  Triglycerides, Serum: 51 mg/dL (12.30.19 @ 01:08)  Triglycerides, Serum: 75 mg/dL (12.25.19 @ 02:29)

## 2019-12-31 NOTE — PROGRESS NOTE ADULT - ASSESSMENT
75 y/o M presenting with septic shock and high grade SBO s/p exploratory laparotomy, lysis of adhesions, decompression of bowel via enterotomy w/ primary repair, and Abthera VAC placement on 12/6; s/p take down of Abthera, washout and re-application of the Abthera vac on 12/8. Now s/p SBR and end ileostomy on 12/10 acute respiratory distress now improving, Pneumothorax, hyperglycemia, delirium.    PLAN:    Neuro: A&Ox3   - Tylenol if needed for pain    Resp: acute respiratory failure upon initial presentation, COPD at baseline, spontaneous left sided pneumothorax s/p pig-tail catheter placement.   - Continue to monitor on RA  - Continue Duonebs, budesonide  - Continue left sided pig-tail to suction     CV: septic shock requiring vasopressor support, now with intermittent hypotension   - Continuing to hold metoprolol in setting of hypotension     GI: SBO s/p exploratory laparotomy, lysis of adhesions, decompression of bowel via enterotomy w/ primary repair, and Abthera VAC placement and removal with 150 cm of small bowel removed and end ileostomy.   - Regular diet with TPN (calorie count)  - Monitor ostomy output, lomotil 2 tabs q6hrs & Imodium 16 mg q6hrs & tincture of opium 6 mg q6hrs, octreotide 100mcg SQ TID  - Continue 0.5/1 repletions with ostomy     Renal: CHI, resolved   - Replete electrolytes as needed  - Monitor I/Os    Heme: no acute issues  - Lovenox for VTE prophylaxis  - SCDs     ID: No active issues   - Trend WBC, monitor fever curve    Endo: no acute issues  - SSI, q6 fingersticks    Dispo: SICU full code    - Buster Bishop PA-C

## 2019-12-31 NOTE — PROGRESS NOTE ADULT - SUBJECTIVE AND OBJECTIVE BOX
Subjective: Patient seen and examined. No new events except as noted.   remains in ICU   24 HOUR EVENTS:  - Went down to IR for left sided pig-tail catheter placement. 70ml of yellow pleural fluid drained. During procedure, patient had a hypotensive episode that was treated with 250ml of 5% albumin. 13--ml of pleural fluid drained from pig-tail catheter.  - Received total of 1 liter of crystalloid and 1 liter of albumin for intermittent hypotension.         REVIEW OF SYSTEMS:    CONSTITUTIONAL:+ weakness, fevers or chills  EYES/ENT: No visual changes;  No vertigo or throat pain   NECK: No pain or stiffness  RESPIRATORY: No cough, wheezing, hemoptysis; No shortness of breath  CARDIOVASCULAR: No chest pain or palpitations  GASTROINTESTINAL: No abdominal or epigastric pain. No nausea, vomiting, or hematemesis; No diarrhea or constipation. No melena or hematochezia.  GENITOURINARY: No dysuria, frequency or hematuria  NEUROLOGICAL: No numbness or weakness  SKIN: No itching, burning, rashes, or lesions   All other review of systems is negative unless indicated above.    MEDICATIONS:  MEDICATIONS  (STANDING):  acyclovir   Oral Tab/Cap 400 milliGRAM(s) Oral three times a day  albuterol/ipratropium for Nebulization 3 milliLiter(s) Nebulizer every 6 hours  buDESOnide    Inhalation Suspension 0.5 milliGRAM(s) Inhalation every 12 hours  ceFAZolin   IVPB      ceFAZolin   IVPB 1000 milliGRAM(s) IV Intermittent every 8 hours  chlorhexidine 2% Cloths 1 Application(s) Topical <User Schedule>  diphenoxylate/atropine 2 Tablet(s) Oral <User Schedule>  enoxaparin Injectable 40 milliGRAM(s) SubCutaneous daily  fat emulsion (Fish Oil and Plant Based) 20% Infusion 20.8 mL/Hr (20.8 mL/Hr) IV Continuous <Continuous>  insulin lispro (HumaLOG) corrective regimen sliding scale   SubCutaneous three times a day before meals  insulin lispro (HumaLOG) corrective regimen sliding scale   SubCutaneous at bedtime  loperamide 16 milliGRAM(s) Oral <User Schedule>  octreotide  Injectable 100 MICROGram(s) SubCutaneous three times a day  opium Tincture 6 milliGRAM(s) Oral <User Schedule>  pantoprazole  Injectable 40 milliGRAM(s) IV Push daily  Parenteral Nutrition - Adult 1 Each (75 mL/Hr) TPN Continuous <Continuous>  Parenteral Nutrition - Adult 1 Each (75 mL/Hr) TPN Continuous <Continuous>  sodium chloride 0.9%. 1000 milliLiter(s) (10 mL/Hr) IV Continuous <Continuous>      PHYSICAL EXAM:  T(C): 36.6 (12-31-19 @ 11:00), Max: 36.9 (12-30-19 @ 23:00)  HR: 76 (12-31-19 @ 12:20) (72 - 95)  BP: 139/63 (12-31-19 @ 11:05) (71/44 - 139/63)  RR: 27 (12-31-19 @ 11:05) (13 - 35)  SpO2: 98% (12-31-19 @ 12:20) (77% - 100%)  Wt(kg): --  I&O's Summary    30 Dec 2019 07:01  -  31 Dec 2019 07:00  --------------------------------------------------------  IN: 5274.6 mL / OUT: 4990 mL / NET: 284.6 mL    31 Dec 2019 07:01  -  31 Dec 2019 12:47  --------------------------------------------------------  IN: 530 mL / OUT: 600 mL / NET: -70 mL                Appearance: NAD   HEENT:   Dry oral mucosa, crusted lips   Lymphatic: No lymphadenopathy   Cardiovascular: Normal S1 S2, No JVD, No murmurs , Peripheral pulses palpable 2+ bilaterally  Respiratory: Decreased bs   Gastrointestinal:  Soft, NT, ND. Ostomy pink with output. Midline staples in place, midline incision site is c/d/i   Skin: No rashes, No ecchymoses, No cyanosis, warm to touch  Musculoskeletal: Decreased  range of motion and strength  Psychiatry:  mildly sedated   Ext: No edema +PICC line   +rosas   +rectal tube       LABS:    CARDIAC MARKERS:                                8.1    13.97 )-----------( 314      ( 31 Dec 2019 06:46 )             26.1     12-31    135  |  98  |  21  ----------------------------<  113<H>  3.9   |  28  |  0.51    Ca    7.7<L>      31 Dec 2019 00:56  Phos  3.6     12-31  Mg     1.9     12-31    TPro  5.6<L>  /  Alb  2.8<L>  /  TBili  0.3  /  DBili  0.2  /  AST  9<L>  /  ALT  14  /  AlkPhos  80  12-31    proBNP:   Lipid Profile:   HgA1c:   TSH:             TELEMETRY: 	SR    ECG:  	  RADIOLOGY:   < from: Xray Chest 1 View- PORTABLE-Routine (12.31.19 @ 06:40) >    EXAM:  XR CHEST PORTABLE ROUTINE 1V                            PROCEDURE DATE:  12/31/2019            INTERPRETATION:  CLINICAL INFORMATION: Left pleural effusion.    EXAM: Frontal chest radiograph dated 12/31/2019.    COMPARISON: Chest radiograph from 12/30/2019.    FINDINGS:  Right PICC line terminates in the SVC. Left chest tube.  Small right pleural effusion. Trace left pneumothorax.  The cardiomediastinal silhouette is within normal limits.    IMPRESSION:   Trace left pneumothorax with chest tube in place.  Small right pleural effusion.                JS PATEL M.D., RADIOLOGY RESIDENT  This document has been electronically signed.  ANNETTA FERREIRA M.D., ATTENDING RADIOLOGIST  This document has been electronically signed. Dec 31 2019 11:50AM                < end of copied text >    DIAGNOSTIC TESTING:  [ ] Echocardiogram:  [ ]  Catheterization:  [ ] Stress Test:    OTHER:

## 2019-12-31 NOTE — PROGRESS NOTE ADULT - ASSESSMENT
Assessment   73 y/o M presenting with septic shock and high grade SBO s/p exploratory laparotomy, lysis of adhesions, decompression of bowel via enterotomy w/ primary repair, and Abthera VAC placement on 12/6; s/p take down of Abthera, washout and re-application of the Abthera vac on 12/8. Now s/p SBR and end ileostomy on 12/10 acute respiratory distress now improving, Pneumothorax, hyperglycemia, delirium. s/p L chest tube placement by IR 12/30 for pleural effusion.    Recommendations:  - f/u FEES today  - monitor chest tube output  - c/w abx for wound infection  -f/u CT C/A/P  - continued care per SICU    RED SURGERY  [id number] Assessment   75 y/o M presenting with septic shock and high grade SBO s/p exploratory laparotomy, lysis of adhesions, decompression of bowel via enterotomy w/ primary repair, and Abthera VAC placement on 12/6; s/p take down of Abthera, washout and re-application of the Abthera vac on 12/8. Now s/p SBR and end ileostomy on 12/10 acute respiratory distress now improving, Pneumothorax, hyperglycemia, delirium. s/p L chest tube placement by IR 12/30 for pleural effusion.    Recommendations:  - f/u FEES today  - monitor chest tube output  - c/w abx for wound infection  - f/u CT C/A/P  - cont tpn, monitor ostomy output  - continue excellent supportive care per SICU    RED SURGERY  [id number]

## 2019-12-31 NOTE — PROGRESS NOTE ADULT - SUBJECTIVE AND OBJECTIVE BOX
HISTORY  74y Male presented with abdominal pain, nausea, hematemesis, and poor PO intake for ~2-3 days. Labs significant for an CHI w/ Cr 2.74 and lactate of 9.4. He was also notably tachycardic to the 110s and hypotensive w/ SBP in the 70s. He was given 2 units of PRBCs and 2 L of LR in the ED due to concern for upper GI bleeding. Imaging revealed high grade SBO in the RLQ. Patient was taken to the OR emergently for an exploratory laparotomy, lysis of adhesions, decompression of bowel via enterotomy w/ primary repair, and Abthera VAC placement. Of note, the distal 50% of the bowel appeared dusky but viable. He required vasopressor support with phenylephrine and vasopressin infusions. He received 3000 mL of crystalloid w/ EBL of 10 mL and UOP of 25 mL. Patient was left intubated at the end of the case so SICU was consulted for hemodynamic monitoring. Taken back to the OR on 12/8 and underwent take down of Abthera, washout and re-application of the Abthera vac. Went back to OR on 12/10 night for colectomy. Post-op course complicated by short gut syndrome      24 HOUR EVENTS:    SUBJECTIVE/ROS:  [ ] A ten-point review of systems was otherwise negative except as noted.  [ ] Due to altered mental status/intubation, subjective information were not able to be obtained from the patient. History was obtained, to the extent possible, from review of the chart and collateral sources of information.      NEURO  RASS:     GCS:     CAM ICU:  Exam: awake, alert, oriented  Meds: ondansetron Injectable 4 milliGRAM(s) IV Push every 6 hours PRN Nausea and/or Vomiting  opium Tincture 6 milliGRAM(s) Oral <User Schedule>    [x] Adequacy of sedation and pain control has been assessed and adjusted      RESPIRATORY  RR: 17 (12-31-19 @ 00:00) (16 - 35)  SpO2: 100% (12-31-19 @ 00:00) (77% - 100%)  Wt(kg): --  Exam: unlabored, clear to auscultation bilaterally  Mechanical Ventilation:     [N/A] Extubation Readiness Assessed  Meds: albuterol/ipratropium for Nebulization 3 milliLiter(s) Nebulizer every 6 hours  buDESOnide    Inhalation Suspension 0.5 milliGRAM(s) Inhalation every 12 hours        CARDIOVASCULAR  HR: 79 (12-31-19 @ 00:00) (76 - 97)  BP: 71/44 (12-31-19 @ 00:00) (71/44 - 136/60)  BP(mean): 54 (12-31-19 @ 00:00) (54 - 88)  ABP: --  ABP(mean): --  Wt(kg): --  CVP(cm H2O): --      Exam: regular rate and rhythm  Cardiac Rhythm: sinus  Perfusion     [x]Adequate   [ ]Inadequate  Mentation   [x]Normal       [ ]Reduced  Extremities  [x]Warm         [ ]Cool  Volume Status [ ]Hypervolemic [x]Euvolemic [ ]Hypovolemic  Meds:       GI/NUTRITION  Exam: soft, nontender, nondistended, incision C/D/I  Diet:  Meds: diphenoxylate/atropine 2 Tablet(s) Oral <User Schedule>  loperamide 16 milliGRAM(s) Oral <User Schedule>  pantoprazole  Injectable 40 milliGRAM(s) IV Push daily      GENITOURINARY  I&O's Detail    12-29 @ 07:01  -  12-30 @ 07:00  --------------------------------------------------------  IN:    fat emulsion (Fish Oil and Plant Based) 20% Infusion: 249.7 mL    sodium chloride 0.9%.: 890 mL    Solution: 100 mL    TPN (Total Parenteral Nutrition): 2100 mL  Total IN: 3339.7 mL    OUT:    Ileostomy: 1680 mL    Incontinent per Condom Catheter: 2200 mL  Total OUT: 3880 mL    Total NET: -540.3 mL      12-30 @ 07:01  -  12-31 @ 00:49  --------------------------------------------------------  IN:    fat emulsion (Fish Oil and Plant Based) 20% Infusion: 104 mL    Sodium Chloride 0.9% IV Bolus: 1000 mL    sodium chloride 0.9%.: 380 mL    Solution: 65 mL    Solution: 50 mL    TPN (Total Parenteral Nutrition): 1200 mL  Total IN: 2799 mL    OUT:    Chest Tube: 1600 mL    Ileostomy: 500 mL    Voided: 1450 mL  Total OUT: 3550 mL    Total NET: -751 mL          12-30    134<L>  |  97  |  22  ----------------------------<  135<H>  4.7   |  30  |  0.48<L>    Ca    8.1<L>      30 Dec 2019 18:26  Phos  4.0     12-30  Mg     2.0     12-30    TPro  5.7<L>  /  Alb  2.2<L>  /  TBili  0.4  /  DBili  0.2  /  AST  15  /  ALT  17  /  AlkPhos  100  12-30    [ ] Martinez catheter, indication: N/A  Meds: albumin human  5% IVPB 500 milliLiter(s) IV Intermittent once  fat emulsion (Fish Oil and Plant Based) 20% Infusion 20.8 mL/Hr IV Continuous <Continuous>  Parenteral Nutrition - Adult 1 Each TPN Continuous <Continuous>  sodium chloride 0.9%. 1000 milliLiter(s) IV Continuous <Continuous>        HEMATOLOGIC  Meds:   [x] VTE Prophylaxis                        7.5    17.40 )-----------( 366      ( 30 Dec 2019 18:26 )             23.7       Transfusion     [ ] PRBC   [ ] Platelets   [ ] FFP   [ ] Cryoprecipitate      INFECTIOUS DISEASES  WBC Count: 17.40 K/uL (12-30 @ 18:26)  WBC Count: 16.53 K/uL (12-30 @ 01:08)    RECENT CULTURES:  Specimen Source: .Body Fluid Pleural Fluid  Date/Time: 12-30 @ 19:16  Culture Results: --  Gram Stain:   No polymorphonuclear cells seen  No organisms seen  by cytocentrifuge  Organism: --    Meds: acyclovir   Oral Tab/Cap 400 milliGRAM(s) Oral three times a day  ceFAZolin   IVPB      ceFAZolin   IVPB 1000 milliGRAM(s) IV Intermittent every 8 hours        ENDOCRINE  CAPILLARY BLOOD GLUCOSE      POCT Blood Glucose.: 139 mg/dL (30 Dec 2019 22:35)  POCT Blood Glucose.: 145 mg/dL (30 Dec 2019 16:58)  POCT Blood Glucose.: 146 mg/dL (30 Dec 2019 10:03)  POCT Blood Glucose.: 185 mg/dL (30 Dec 2019 04:55)    Meds: insulin lispro (HumaLOG) corrective regimen sliding scale   SubCutaneous three times a day before meals  insulin lispro (HumaLOG) corrective regimen sliding scale   SubCutaneous at bedtime  octreotide  Injectable 100 MICROGram(s) SubCutaneous three times a day        ACCESS DEVICES:  [ ] Peripheral IV  [ ] Central Venous Line	[ ] R	[ ] L	[ ] IJ	[ ] Fem	[ ] SC	Placed:   [ ] Arterial Line		[ ] R	[ ] L	[ ] Fem	[ ] Rad	[ ] Ax	Placed:   [ ] PICC:					[ ] Mediport  [ ] Urinary Catheter, Date Placed:   [x] Necessity of urinary, arterial, and venous catheters discussed    OTHER MEDICATIONS:  chlorhexidine 2% Cloths 1 Application(s) Topical <User Schedule>      CODE STATUS:      IMAGING: HISTORY  74y Male presented with abdominal pain, nausea, hematemesis, and poor PO intake for ~2-3 days. Labs significant for an CHI w/ Cr 2.74 and lactate of 9.4. He was also notably tachycardic to the 110s and hypotensive w/ SBP in the 70s. He was given 2 units of PRBCs and 2 L of LR in the ED due to concern for upper GI bleeding. Imaging revealed high grade SBO in the RLQ. Patient was taken to the OR emergently for an exploratory laparotomy, lysis of adhesions, decompression of bowel via enterotomy w/ primary repair, and Abthera VAC placement. Of note, the distal 50% of the bowel appeared dusky but viable. He required vasopressor support with phenylephrine and vasopressin infusions. He received 3000 mL of crystalloid w/ EBL of 10 mL and UOP of 25 mL. Patient was left intubated at the end of the case so SICU was consulted for hemodynamic monitoring. Taken back to the OR on 12/8 and underwent take down of Abthera, washout and re-application of the Abthera vac. Went back to OR on 12/10 night for colectomy. Post-op course complicated by short gut syndrome      24 HOUR EVENTS:  - Went down to IR for left sided pig-tail catheter placement. 70ml of yellow pleural fluid drained. During procedure, patient had a hypotensive episode that was treated with 250ml of 5% albumin. 13--ml of pleural fluid drained from pig-tail catheter.  - Received total of 1 liter of crystalloid and 1 liter of albumin for intermittent hypotension.     SUBJECTIVE/ROS:  [ ] A ten-point review of systems was otherwise negative except as noted.  [ ] Due to altered mental status/intubation, subjective information were not able to be obtained from the patient. History was obtained, to the extent possible, from review of the chart and collateral sources of information.      NEURO  Exam: awake, alert, oriented  Meds: ondansetron Injectable 4 milliGRAM(s) IV Push every 6 hours PRN Nausea and/or Vomiting  opium Tincture 6 milliGRAM(s) Oral <User Schedule>    [x] Adequacy of sedation and pain control has been assessed and adjusted      RESPIRATORY  RR: 17 (12-31-19 @ 00:00) (16 - 35)  SpO2: 100% (12-31-19 @ 00:00) (77% - 100%)  Exam: unlabored, clear to auscultation bilaterally  Mechanical Ventilation: none  [N/A] Extubation Readiness Assessed  Meds: albuterol/ipratropium for Nebulization 3 milliLiter(s) Nebulizer every 6 hours  buDESOnide    Inhalation Suspension 0.5 milliGRAM(s) Inhalation every 12 hours        CARDIOVASCULAR  HR: 79 (12-31-19 @ 00:00) (76 - 97)  BP: 71/44 (12-31-19 @ 00:00) (71/44 - 136/60)  BP(mean): 54 (12-31-19 @ 00:00) (54 - 88)  Exam: regular rate and rhythm  Cardiac Rhythm: sinus  Perfusion     [x]Adequate   [ ]Inadequate  Mentation   [x]Normal       [ ]Reduced  Extremities  [x]Warm         [ ]Cool  Volume Status [ ]Hypervolemic [x]Euvolemic [ ]Hypovolemic  Meds: none       GI/NUTRITION  Exam: soft, nontender, nondistended  Diet: regular diet   Meds: diphenoxylate/atropine 2 Tablet(s) Oral <User Schedule>  loperamide 16 milliGRAM(s) Oral <User Schedule>  pantoprazole  Injectable 40 milliGRAM(s) IV Push daily      GENITOURINARY  I&O's Detail    12-29 @ 07:01  -  12-30 @ 07:00  --------------------------------------------------------  IN:    fat emulsion (Fish Oil and Plant Based) 20% Infusion: 249.7 mL    sodium chloride 0.9%.: 890 mL    Solution: 100 mL    TPN (Total Parenteral Nutrition): 2100 mL  Total IN: 3339.7 mL    OUT:    Ileostomy: 1680 mL    Incontinent per Condom Catheter: 2200 mL  Total OUT: 3880 mL    Total NET: -540.3 mL      12-30 @ 07:01  -  12-31 @ 00:49  --------------------------------------------------------  IN:    fat emulsion (Fish Oil and Plant Based) 20% Infusion: 104 mL    Sodium Chloride 0.9% IV Bolus: 1000 mL    sodium chloride 0.9%.: 380 mL    Solution: 65 mL    Solution: 50 mL    TPN (Total Parenteral Nutrition): 1200 mL  Total IN: 2799 mL    OUT:    Chest Tube: 1600 mL    Ileostomy: 500 mL    Voided: 1450 mL  Total OUT: 3550 mL    Total NET: -751 mL          12-30    134<L>  |  97  |  22  ----------------------------<  135<H>  4.7   |  30  |  0.48<L>    Ca    8.1<L>      30 Dec 2019 18:26  Phos  4.0     12-30  Mg     2.0     12-30    TPro  5.7<L>  /  Alb  2.2<L>  /  TBili  0.4  /  DBili  0.2  /  AST  15  /  ALT  17  /  AlkPhos  100  12-30    [ ] Martinez catheter, indication: N/A  Meds: albumin human  5% IVPB 500 milliLiter(s) IV Intermittent once  fat emulsion (Fish Oil and Plant Based) 20% Infusion 20.8 mL/Hr IV Continuous <Continuous>  Parenteral Nutrition - Adult 1 Each TPN Continuous <Continuous>  sodium chloride 0.9%. 1000 milliLiter(s) IV Continuous <Continuous>        HEMATOLOGIC  Meds: none   [x] VTE Prophylaxis                        7.5    17.40 )-----------( 366      ( 30 Dec 2019 18:26 )             23.7       Transfusion     [ ] PRBC   [ ] Platelets   [ ] FFP   [ ] Cryoprecipitate      INFECTIOUS DISEASES  WBC Count: 17.40 K/uL (12-30 @ 18:26)  WBC Count: 16.53 K/uL (12-30 @ 01:08)    RECENT CULTURES:  Specimen Source: .Body Fluid Pleural Fluid  Date/Time: 12-30 @ 19:16  Culture Results: --  Gram Stain:   No polymorphonuclear cells seen  No organisms seen  by cytocentrifuge  Organism: --    Meds: acyclovir   Oral Tab/Cap 400 milliGRAM(s) Oral three times a day  ceFAZolin   IVPB      ceFAZolin   IVPB 1000 milliGRAM(s) IV Intermittent every 8 hours        ENDOCRINE  CAPILLARY BLOOD GLUCOSE      POCT Blood Glucose.: 139 mg/dL (30 Dec 2019 22:35)  POCT Blood Glucose.: 145 mg/dL (30 Dec 2019 16:58)  POCT Blood Glucose.: 146 mg/dL (30 Dec 2019 10:03)  POCT Blood Glucose.: 185 mg/dL (30 Dec 2019 04:55)    Meds: insulin lispro (HumaLOG) corrective regimen sliding scale   SubCutaneous three times a day before meals  insulin lispro (HumaLOG) corrective regimen sliding scale   SubCutaneous at bedtime  octreotide  Injectable 100 MICROGram(s) SubCutaneous three times a day        ACCESS DEVICES:  [x] Peripheral IV  [ ] Central Venous Line	[ ] R	[ ] L	[ ] IJ	[ ] Fem	[ ] SC	Placed:   [ ] Arterial Line		[ ] R	[ ] L	[ ] Fem	[ ] Rad	[ ] Ax	Placed:   [ ] PICC:					[ ] Mediport  [ ] Urinary Catheter, Date Placed:   [x] Necessity of urinary, arterial, and venous catheters discussed    OTHER MEDICATIONS:  chlorhexidine 2% Cloths 1 Application(s) Topical <User Schedule>      CODE STATUS: full code       IMAGING: < from: CT Abdomen and Pelvis w/ IV Cont (12.30.19 @ 06:05) >  IMPRESSION:     Small midline anterior abdominal wall fluid collection may represent postoperative seroma, hematoma, or less likely abscess.    Interval resolution of the left pneumothorax.    Bilateral pleural effusions, left greaterthan right, slightly increased in size compared to the prior study.    Unchanged right lower lobe tree-in-bud/nodular opacities with secretions in the bilateral lower lobe bronchi. Findings may represent pneumonia versus distal mucoid impacted airways.            < end of copied text >

## 2019-12-31 NOTE — SWALLOW FEES ASSESSMENT ADULT - DIAGNOSTIC IMPRESSIONS
Attempted to see pt for FEES today.  Upon encounter pt upright in chair, +O2/NC, A&Ox4, wife present.  Explained exam and rationale to pt/wife who verbalized understanding, however, pt refused to proceed with FEES today stating he felt overwhelmed with all the information.  Pt also stated that he typically wears dentures when consuming solid foods.  Reported he has dentures here with him and wife will provide dental paste on day of exam.  Pt informed that FEES will tentatively be scheduled for Thursday 1/2 -> pt and wife in agreement with POC.  Discussed case with SICU MDs who would prefer pt have FEES as opposed to MBS due to concern re: ostomy output.  This service to f/u on 1/2 and reattempt FEES.

## 2020-01-01 LAB
ALBUMIN SERPL ELPH-MCNC: 2.4 G/DL — LOW (ref 3.3–5)
ALP SERPL-CCNC: 76 U/L — SIGNIFICANT CHANGE UP (ref 40–120)
ALT FLD-CCNC: 14 U/L — SIGNIFICANT CHANGE UP (ref 10–45)
ANION GAP SERPL CALC-SCNC: 6 MMOL/L — SIGNIFICANT CHANGE UP (ref 5–17)
AST SERPL-CCNC: 14 U/L — SIGNIFICANT CHANGE UP (ref 10–40)
BILIRUB DIRECT SERPL-MCNC: 0.2 MG/DL — SIGNIFICANT CHANGE UP (ref 0–0.2)
BILIRUB INDIRECT FLD-MCNC: 0.2 MG/DL — SIGNIFICANT CHANGE UP (ref 0.2–1)
BILIRUB SERPL-MCNC: 0.4 MG/DL — SIGNIFICANT CHANGE UP (ref 0.2–1.2)
BUN SERPL-MCNC: 21 MG/DL — SIGNIFICANT CHANGE UP (ref 7–23)
CALCIUM SERPL-MCNC: 7.8 MG/DL — LOW (ref 8.4–10.5)
CHLORIDE SERPL-SCNC: 97 MMOL/L — SIGNIFICANT CHANGE UP (ref 96–108)
CO2 SERPL-SCNC: 30 MMOL/L — SIGNIFICANT CHANGE UP (ref 22–31)
CREAT SERPL-MCNC: 0.48 MG/DL — LOW (ref 0.5–1.3)
GLUCOSE BLDC GLUCOMTR-MCNC: 103 MG/DL — HIGH (ref 70–99)
GLUCOSE BLDC GLUCOMTR-MCNC: 125 MG/DL — HIGH (ref 70–99)
GLUCOSE BLDC GLUCOMTR-MCNC: 130 MG/DL — HIGH (ref 70–99)
GLUCOSE BLDC GLUCOMTR-MCNC: 142 MG/DL — HIGH (ref 70–99)
GLUCOSE SERPL-MCNC: 138 MG/DL — HIGH (ref 70–99)
HCT VFR BLD CALC: 28.2 % — LOW (ref 39–50)
HGB BLD-MCNC: 8.9 G/DL — LOW (ref 13–17)
MAGNESIUM SERPL-MCNC: 1.7 MG/DL — SIGNIFICANT CHANGE UP (ref 1.6–2.6)
MCHC RBC-ENTMCNC: 29.9 PG — SIGNIFICANT CHANGE UP (ref 27–34)
MCHC RBC-ENTMCNC: 31.6 GM/DL — LOW (ref 32–36)
MCV RBC AUTO: 94.6 FL — SIGNIFICANT CHANGE UP (ref 80–100)
NRBC # BLD: 0 /100 WBCS — SIGNIFICANT CHANGE UP (ref 0–0)
PHOSPHATE SERPL-MCNC: 2.9 MG/DL — SIGNIFICANT CHANGE UP (ref 2.5–4.5)
PLATELET # BLD AUTO: 327 K/UL — SIGNIFICANT CHANGE UP (ref 150–400)
POTASSIUM SERPL-MCNC: 4 MMOL/L — SIGNIFICANT CHANGE UP (ref 3.5–5.3)
POTASSIUM SERPL-SCNC: 4 MMOL/L — SIGNIFICANT CHANGE UP (ref 3.5–5.3)
PREALB SERPL-MCNC: 16 MG/DL — LOW (ref 20–40)
PROT SERPL-MCNC: 5.3 G/DL — LOW (ref 6–8.3)
RBC # BLD: 2.98 M/UL — LOW (ref 4.2–5.8)
RBC # FLD: 14.9 % — HIGH (ref 10.3–14.5)
SODIUM SERPL-SCNC: 133 MMOL/L — LOW (ref 135–145)
TRIGL SERPL-MCNC: 56 MG/DL — SIGNIFICANT CHANGE UP (ref 10–149)
WBC # BLD: 15.84 K/UL — HIGH (ref 3.8–10.5)
WBC # FLD AUTO: 15.84 K/UL — HIGH (ref 3.8–10.5)

## 2020-01-01 PROCEDURE — 71045 X-RAY EXAM CHEST 1 VIEW: CPT | Mod: 26

## 2020-01-01 PROCEDURE — 99232 SBSQ HOSP IP/OBS MODERATE 35: CPT

## 2020-01-01 RX ORDER — MAGNESIUM SULFATE 500 MG/ML
2 VIAL (ML) INJECTION ONCE
Refills: 0 | Status: COMPLETED | OUTPATIENT
Start: 2020-01-01 | End: 2020-01-01

## 2020-01-01 RX ORDER — SODIUM CHLORIDE 9 MG/ML
1000 INJECTION INTRAMUSCULAR; INTRAVENOUS; SUBCUTANEOUS
Refills: 0 | Status: DISCONTINUED | OUTPATIENT
Start: 2020-01-01 | End: 2020-01-02

## 2020-01-01 RX ORDER — I.V. FAT EMULSION 20 G/100ML
20.8 EMULSION INTRAVENOUS
Qty: 50 | Refills: 0 | Status: DISCONTINUED | OUTPATIENT
Start: 2020-01-01 | End: 2020-01-02

## 2020-01-01 RX ORDER — ELECTROLYTE SOLUTION,INJ
1 VIAL (ML) INTRAVENOUS
Refills: 0 | Status: DISCONTINUED | OUTPATIENT
Start: 2020-01-01 | End: 2020-01-02

## 2020-01-01 RX ADMIN — Medication 16 MILLIGRAM(S): at 12:39

## 2020-01-01 RX ADMIN — Medication 16 MILLIGRAM(S): at 17:07

## 2020-01-01 RX ADMIN — Medication 3 MILLILITER(S): at 12:35

## 2020-01-01 RX ADMIN — Medication 1 EACH: at 17:25

## 2020-01-01 RX ADMIN — Medication 16 MILLIGRAM(S): at 08:50

## 2020-01-01 RX ADMIN — Medication 100 MILLIGRAM(S): at 22:18

## 2020-01-01 RX ADMIN — Medication 3 MILLILITER(S): at 23:45

## 2020-01-01 RX ADMIN — Medication 2 TABLET(S): at 17:12

## 2020-01-01 RX ADMIN — Medication 0.5 MILLIGRAM(S): at 06:29

## 2020-01-01 RX ADMIN — Medication 1 EACH: at 17:26

## 2020-01-01 RX ADMIN — Medication 400 MILLIGRAM(S): at 13:53

## 2020-01-01 RX ADMIN — Medication 3 MILLILITER(S): at 17:29

## 2020-01-01 RX ADMIN — Medication 2 TABLET(S): at 12:39

## 2020-01-01 RX ADMIN — Medication 400 MILLIGRAM(S): at 05:23

## 2020-01-01 RX ADMIN — Medication 2 TABLET(S): at 22:17

## 2020-01-01 RX ADMIN — SODIUM CHLORIDE 10 MILLILITER(S): 9 INJECTION INTRAMUSCULAR; INTRAVENOUS; SUBCUTANEOUS at 12:40

## 2020-01-01 RX ADMIN — MORPHINE 6 MILLIGRAM(S): 10 SOLUTION ORAL at 22:17

## 2020-01-01 RX ADMIN — Medication 100 MILLIGRAM(S): at 13:53

## 2020-01-01 RX ADMIN — MORPHINE 6 MILLIGRAM(S): 10 SOLUTION ORAL at 12:39

## 2020-01-01 RX ADMIN — MORPHINE 6 MILLIGRAM(S): 10 SOLUTION ORAL at 08:32

## 2020-01-01 RX ADMIN — CHLORHEXIDINE GLUCONATE 1 APPLICATION(S): 213 SOLUTION TOPICAL at 06:55

## 2020-01-01 RX ADMIN — Medication 50 GRAM(S): at 05:23

## 2020-01-01 RX ADMIN — PANTOPRAZOLE SODIUM 40 MILLIGRAM(S): 20 TABLET, DELAYED RELEASE ORAL at 11:05

## 2020-01-01 RX ADMIN — SODIUM CHLORIDE 10 MILLILITER(S): 9 INJECTION INTRAMUSCULAR; INTRAVENOUS; SUBCUTANEOUS at 22:17

## 2020-01-01 RX ADMIN — OCTREOTIDE ACETATE 100 MICROGRAM(S): 200 INJECTION, SOLUTION INTRAVENOUS; SUBCUTANEOUS at 22:18

## 2020-01-01 RX ADMIN — Medication 400 MILLIGRAM(S): at 22:18

## 2020-01-01 RX ADMIN — OCTREOTIDE ACETATE 100 MICROGRAM(S): 200 INJECTION, SOLUTION INTRAVENOUS; SUBCUTANEOUS at 05:23

## 2020-01-01 RX ADMIN — Medication 100 MILLIGRAM(S): at 05:23

## 2020-01-01 RX ADMIN — Medication 0.5 MILLIGRAM(S): at 17:29

## 2020-01-01 RX ADMIN — Medication 16 MILLIGRAM(S): at 22:18

## 2020-01-01 RX ADMIN — OCTREOTIDE ACETATE 100 MICROGRAM(S): 200 INJECTION, SOLUTION INTRAVENOUS; SUBCUTANEOUS at 13:52

## 2020-01-01 RX ADMIN — Medication 2 TABLET(S): at 08:27

## 2020-01-01 RX ADMIN — ENOXAPARIN SODIUM 40 MILLIGRAM(S): 100 INJECTION SUBCUTANEOUS at 11:05

## 2020-01-01 RX ADMIN — Medication 3 MILLILITER(S): at 06:29

## 2020-01-01 RX ADMIN — MORPHINE 6 MILLIGRAM(S): 10 SOLUTION ORAL at 17:09

## 2020-01-01 NOTE — PROGRESS NOTE ADULT - SUBJECTIVE AND OBJECTIVE BOX
Subjective: Pt states" " Denies any CP, SOB, palpitations. No acute events overnight.    Vital Signs:  Vital Signs Last 24 Hrs  T(C): 36.9 (01-01-20 @ 12:00), Max: 37.1 (12-31-19 @ 23:00)  T(F): 98.4 (01-01-20 @ 12:00), Max: 98.8 (12-31-19 @ 23:00)  HR: 82 (01-01-20 @ 12:00) (75 - 93)  BP: 100/60 (01-01-20 @ 12:00) (85/50 - 163/84)  RR: 25 (01-01-20 @ 12:00) (16 - 33)  SpO2: 96% (01-01-20 @ 12:00) (91% - 100%) on (O2)        Relevant labs, radiology and Medications reviewed                        8.9    15.84 )-----------( 327      ( 01 Jan 2020 00:42 )             28.2     01-01    133<L>  |  97  |  21  ----------------------------<  138<H>  4.0   |  30  |  0.48<L>    Ca    7.8<L>      01 Jan 2020 00:42  Phos  2.9     01-01  Mg     1.7     01-01    TPro  5.3<L>  /  Alb  2.4<L>  /  TBili  0.4  /  DBili  0.2  /  AST  14  /  ALT  14  /  AlkPhos  76  01-01      MEDICATIONS  (STANDING):  acyclovir   Oral Tab/Cap 400 milliGRAM(s) Oral three times a day  albuterol/ipratropium for Nebulization 3 milliLiter(s) Nebulizer every 6 hours  buDESOnide    Inhalation Suspension 0.5 milliGRAM(s) Inhalation every 12 hours  ceFAZolin   IVPB      ceFAZolin   IVPB 1000 milliGRAM(s) IV Intermittent every 8 hours  chlorhexidine 2% Cloths 1 Application(s) Topical <User Schedule>  diphenoxylate/atropine 2 Tablet(s) Oral <User Schedule>  enoxaparin Injectable 40 milliGRAM(s) SubCutaneous daily  fat emulsion (Fish Oil and Plant Based) 20% Infusion 20.8 mL/Hr (20.8 mL/Hr) IV Continuous <Continuous>  insulin lispro (HumaLOG) corrective regimen sliding scale   SubCutaneous three times a day before meals  insulin lispro (HumaLOG) corrective regimen sliding scale   SubCutaneous at bedtime  loperamide 16 milliGRAM(s) Oral <User Schedule>  octreotide  Injectable 100 MICROGram(s) SubCutaneous three times a day  opium Tincture 6 milliGRAM(s) Oral <User Schedule>  pantoprazole  Injectable 40 milliGRAM(s) IV Push daily  Parenteral Nutrition - Adult 1 Each (75 mL/Hr) TPN Continuous <Continuous>  Parenteral Nutrition - Adult 1 Each (75 mL/Hr) TPN Continuous <Continuous>  sodium chloride 0.9%. 1000 milliLiter(s) (10 mL/Hr) IV Continuous <Continuous>    MEDICATIONS  (PRN):  ondansetron Injectable 4 milliGRAM(s) IV Push every 6 hours PRN Nausea and/or Vomiting      I&O's Summary    31 Dec 2019 07:01  -  01 Jan 2020 07:00  --------------------------------------------------------  IN: 4450.4 mL / OUT: 4180 mL / NET: 270.4 mL    01 Jan 2020 07:01  -  01 Jan 2020 12:12  --------------------------------------------------------  IN: 815 mL / OUT: 800 mL / NET: 15 mL        IMAGING: reviewed    CXR:    CT Chest:    PAST MEDICAL & SURGICAL HISTORY:  COPD with hypoxia  ETOHism  ETOH abuse  History of lumbosacral spine surgery  History of prostate surgery  History of appendectomy       Physical Exam:  Neurology: A&Ox3, nonfocal, WILEY x 4, NAD  Respiratory: B/L BS CTA, diminished at bases, No wheezing, rales, rhonchi  CV: RRR, S1S2

## 2020-01-01 NOTE — PROGRESS NOTE ADULT - ASSESSMENT
74 year old male w/ PMH of COPD and EtOH dependence with PSH/o appy, prostate surgery, & spine surgery  septic shock and high grade SBO s/p exploratory laparotomy, lysis of adhesions, decompression of bowel via enterotomy w/ primary repair, and Abthera VAC placement on 12/6; s/p take down of Abthera, washout and re-application of the Abthera vac on 12/8. Now s/p SBR and end ileostomy on 12/10.   TPN consulted to assist w/ management of pt's nutrition in pt w/ prolonged hospital course now tolerating diet but has high Ileostomy output     Continue TPN at goal for pt w/ severe Protein-Calorie Malnutrition- improving high ostomy output  Tolerating Dysphagia 1 Pureed-Nectar Consistency Fluid     - Continue TPN at  goal in 1.8 in pt w/ severe Protein-Calorie Malnutrition w/ high ostomy output. Pt started on Dysphagia 1 Pureed-Nectar Consistency Fluid and tolerating  - HypoNa, fluid restricted to 1.8l checked with pharmacy. total [Na] kept the same at 200  - Hyperkalemia - improving w/ decreased KCl at 60 mEq KCl in TPN, possibly due to Octreotide  - HypoPhos improving- with NaPHos to 45mMol   - Strict Intake and Output- high ileostomy output -as per SICU to replete prn, and dose adjust Octreotide, lomotil, imodium, and tincture of opium.  - Requested of SICU team to send ostomy output for electrolyte evaluation for fecal fat testing to see what pt is absorbing, fecal electrolytes content, and fat content.  - Hyperglycemia -insulin put back in TPN bag at 5U. May also be related to recent possible wound infection.  - WBC elevated, continue to trend, pt afebrile, pt placed on Ancef yesterday for midline incision around erythema and exudate.  - For IR drainage Monday 12/30 of Loculated Lt chest pleural effusion  - PICC w/dedicated port for only TPN - maintenance as per protocol  - Weights three times a week  - Monitor BMP, Mg, Ionized Ca, Phosphorus daily  - Continue to monitor Triglycerides weekly and Pre-albumin weekly.    Continue as per SICU / Surgery, will follow with you, D/w primary team    TPN team, pager [phone]  D/w Ana M Siegel 74 year old male w/ PMH of COPD and EtOH dependence with PSH/o appy, prostate surgery, & spine surgery  septic shock and high grade SBO s/p exploratory laparotomy, lysis of adhesions, decompression of bowel via enterotomy w/ primary repair, and Abthera VAC placement on 12/6; s/p take down of Abthera, washout and re-application of the Abthera vac on 12/8. Now s/p SBR and end ileostomy on 12/10.   TPN consulted to assist w/ management of pt's nutrition in pt w/ prolonged hospital course now tolerating diet but has high Ileostomy output     Continue TPN at goal for pt w/ severe Protein-Calorie Malnutrition- improving high ostomy output  Tolerating Dysphagia 1 Pureed-Nectar Consistency Fluid     - Continue TPN at  goal in 1.8 in pt w/ severe Protein-Calorie Malnutrition w/ high ostomy output. Pt started on Dysphagia 1 Pureed-Nectar Consistency Fluid and tolerating  - HypoNa, fluid restricted to 1.8l Total [Na] kept the same at 200  - Hyperkalemia - improving w/ decreased KCl at 40 mEq KCl in TPN, possibly due to Octreotide  - HypoPhos improving- with NaPHos to 45mMol   - Strict Intake and Output- high ileostomy output -as per SICU to replete prn, and dose adjust Octreotide, lomotil, imodium, and tincture of opium.  - Requested of SICU team to send ostomy output for electrolyte evaluation for fecal fat testing to see what pt is absorbing, fecal electrolytes content, and fat content.  - Hyperglycemia - improved, Insulin at 0.  - WBC elevated, continue to trend, pt afebrile, pt placed on Ancef yesterday for midline incision around erythema and exudate.  - s/p  IR drainage Monday 12/30 of Loculated Lt chest pleural effusion. f/u cytology  - PICC w/dedicated port for only TPN - maintenance as per protocol  - Weights three times a week  - Monitor BMP, Mg, Ionized Ca, Phosphorus daily  - Continue to monitor Triglycerides weekly and Pre-albumin weekly.    Continue as per SICU / Surgery, will follow with you, D/w primary team    TPN team, pager [phone]  D/w Ana M Siegel

## 2020-01-01 NOTE — PROGRESS NOTE ADULT - ASSESSMENT
75 y/o M presenting with septic shock and high grade SBO s/p exploratory laparotomy, lysis of adhesions, decompression of bowel via enterotomy w/ primary repair, and Abthera VAC placement on 12/6; s/p take down of Abthera, washout and re-application of the Abthera vac on 12/8. Now s/p SBR and end ileostomy on 12/10 acute respiratory distress now improving, Pneumothorax, hyperglycemia, delirium. Now still with persistent pneumothorax when chest pigtail clamped.   12/23 VSS on room air recommending IR drainage of left chest  12/24 No evidence of air leak to left chest tube. Tube clamped. Repeat chest cxr in 4 hours. Anticipate chest tube removal if lung remains expanded. Drainage of loculated left effusion discussed with IR. IR to reconsult for drainage once initial tube removed. Discussed plan with Dr Mayes and IR Fellow  12/24  On NC ,    VSSchest xray sm lt pl eff, left chest tube dc'd.  12/26    2l NC   co  sob,  lt side diminshed  12/27 VSS, CXR with unchanged loculated left pleural effusion- IR consulted for pigtail placement, states effusion can be drained via thoracentesis, pigtail placement not indicated at this time - Dr. Mayes to speak with IR attending.   12/30 Patient with persistent PTX  and left pleural effusion now for IR drainage with Dr Elkins.   12/31 HD stable, L PTC w/ high output, serosanguinous drainage, 990 ml overnight/ 2200 in 24 hrs. Tolerating 2L NC supplemental O2, Cyto pending, continue monitor drainage output.   1/1: LPTC still with High output-400/24h. Continue with pigtail cath drain.

## 2020-01-01 NOTE — PROGRESS NOTE ADULT - SUBJECTIVE AND OBJECTIVE BOX
Surgery Progress Note  Patient is a 74y old  Male who presents with a chief complaint of abd. pain (01 Jan 2020 08:35)      SUBJECTIVE: Patient seen and examined at bedside with surgical team  State breathing has not improved since pigtail placement.   On 2L NC and continue to have pursed lip breathing     24hr Events Per SICU   - Given troponin elevation, TTE was done: EF: 50-55% with no acute cardiac pathology noted  - Tolerated Dysphagia diet and thickened non-alcoholic apple cider in company of staff during midnight celebratory countdown.   - Ostomy output increased now that patient is consuming food 2500/24hr     Vital Signs Last 24 Hrs  T(C): 37 (01 Jan 2020 07:00), Max: 37.1 (31 Dec 2019 23:00)  T(F): 98.6 (01 Jan 2020 07:00), Max: 98.8 (31 Dec 2019 23:00)  HR: 80 (01 Jan 2020 07:00) (75 - 91)  BP: 153/74 (01 Jan 2020 07:00) (85/50 - 154/74)  BP(mean): 106 (01 Jan 2020 07:00) (62 - 113)  RR: 21 (01 Jan 2020 07:00) (16 - 33)  SpO2: 96% (01 Jan 2020 07:00) (93% - 100%)    Physical Exam  Constitutional: NAD  Respiratory: on 2L NC, stating 94%, increased work of breathing noted   Abd: soft, NT, ND, ostomy is pink, midline wound with non-expanding surrounding erythema   Ext: moving all 4 ext spontaneously     I&O's Detail    31 Dec 2019 07:01  -  01 Jan 2020 07:00  --------------------------------------------------------  IN:    fat emulsion (Fish Oil and Plant Based) 20% Infusion: 670.4 mL    Oral Fluid: 120 mL    sodium chloride 0.9%: 700 mL    sodium chloride 0.9%.: 1010 mL    Solution: 50 mL    Solution: 50 mL    Solution: 50 mL    TPN (Total Parenteral Nutrition): 1800 mL  Total IN: 4450.4 mL    OUT:    Chest Tube: 400 mL    Ileostomy: 2500 mL    Voided: 1280 mL  Total OUT: 4180 mL    Total NET: 270.4 mL      MEDICATIONS  (STANDING):  acyclovir   Oral Tab/Cap 400 milliGRAM(s) Oral three times a day  albuterol/ipratropium for Nebulization 3 milliLiter(s) Nebulizer every 6 hours  buDESOnide    Inhalation Suspension 0.5 milliGRAM(s) Inhalation every 12 hours  ceFAZolin   IVPB      ceFAZolin   IVPB 1000 milliGRAM(s) IV Intermittent every 8 hours  chlorhexidine 2% Cloths 1 Application(s) Topical <User Schedule>  diphenoxylate/atropine 2 Tablet(s) Oral <User Schedule>  enoxaparin Injectable 40 milliGRAM(s) SubCutaneous daily  insulin lispro (HumaLOG) corrective regimen sliding scale   SubCutaneous three times a day before meals  insulin lispro (HumaLOG) corrective regimen sliding scale   SubCutaneous at bedtime  loperamide 16 milliGRAM(s) Oral <User Schedule>  octreotide  Injectable 100 MICROGram(s) SubCutaneous three times a day  opium Tincture 6 milliGRAM(s) Oral <User Schedule>  pantoprazole  Injectable 40 milliGRAM(s) IV Push daily  Parenteral Nutrition - Adult 1 Each (75 mL/Hr) TPN Continuous <Continuous>  sodium chloride 0.9%. 1000 milliLiter(s) (10 mL/Hr) IV Continuous <Continuous>    MEDICATIONS  (PRN):  ondansetron Injectable 4 milliGRAM(s) IV Push every 6 hours PRN Nausea and/or Vomiting      LABS:                        8.9    15.84 )-----------( 327      ( 01 Jan 2020 00:42 )             28.2     01-01    133<L>  |  97  |  21  ----------------------------<  138<H>  4.0   |  30  |  0.48<L>    Ca    7.8<L>      01 Jan 2020 00:42  Phos  2.9     01-01  Mg     1.7     01-01    TPro  5.3<L>  /  Alb  2.4<L>  /  TBili  0.4  /  DBili  0.2  /  AST  14  /  ALT  14  /  AlkPhos  76  01-01      LIVER FUNCTIONS - ( 01 Jan 2020 00:42 )  Alb: 2.4 g/dL / Pro: 5.3 g/dL / ALK PHOS: 76 U/L / ALT: 14 U/L / AST: 14 U/L / GGT: x

## 2020-01-01 NOTE — PROGRESS NOTE ADULT - SUBJECTIVE AND OBJECTIVE BOX
[facility] NUTRITION SUPPORT--  Attending/ PA FOLLOW UP NOTE      24 hour events/subjective: Denies Palpitations, chest pain, shortness of breath. Denies nausea nor vomiting nor abdominal pain.    - Given troponin elevation, TTE was done: EF: 50-55% with no acute cardiac pathology noted  - Tolerated Dysphagia diet and thickened non-alcoholic apple cider in company of staff during midnight celebratory countdown.   - Ostomy output increased now that patient is consuming food    PAST HISTORY  --------------------------------------------------------------------------------  No significant changes to PMH, PSH, FHx, SHx, unless otherwise noted    ALLERGIES & MEDICATIONS  --------------------------------------------------------------------------------  Allergies    IV Contrast (Hives)    Intolerances      Standing Inpatient Medications  acyclovir   Oral Tab/Cap 400 milliGRAM(s) Oral three times a day  albuterol/ipratropium for Nebulization 3 milliLiter(s) Nebulizer every 6 hours  buDESOnide    Inhalation Suspension 0.5 milliGRAM(s) Inhalation every 12 hours  ceFAZolin   IVPB      ceFAZolin   IVPB 1000 milliGRAM(s) IV Intermittent every 8 hours  chlorhexidine 2% Cloths 1 Application(s) Topical <User Schedule>  diphenoxylate/atropine 2 Tablet(s) Oral <User Schedule>  enoxaparin Injectable 40 milliGRAM(s) SubCutaneous daily  fat emulsion (Fish Oil and Plant Based) 20% Infusion 20.8 mL/Hr IV Continuous <Continuous>  insulin lispro (HumaLOG) corrective regimen sliding scale   SubCutaneous three times a day before meals  insulin lispro (HumaLOG) corrective regimen sliding scale   SubCutaneous at bedtime  loperamide 16 milliGRAM(s) Oral <User Schedule>  octreotide  Injectable 100 MICROGram(s) SubCutaneous three times a day  opium Tincture 6 milliGRAM(s) Oral <User Schedule>  pantoprazole  Injectable 40 milliGRAM(s) IV Push daily  Parenteral Nutrition - Adult 1 Each TPN Continuous <Continuous>  sodium chloride 0.9%. 1000 milliLiter(s) IV Continuous <Continuous>    PRN Inpatient Medications  ondansetron Injectable 4 milliGRAM(s) IV Push every 6 hours PRN      REVIEW OF SYSTEMS  --------------------------------------------------------------------------------  Gen: as per HPI  Skin: No rashes  Head/Eyes/Ears/Mouth: No headache;No sore throat  Respiratory: No dyspnea, cough,   CV: No chest pain, PND, orthopnea  GI: as per HPI  : No increased frequency, dysuria, hematuria, nocturia  MSK: No joint pain/swelling; no back pain; no edema  Neuro: No dizziness/lightheadedness, weakness, seizures, numbness, tingling  Psych: No significant nervousness, anxiety, stress, depression    All other systems were reviewed and are negative, except as noted.      LABS/STUDIES  --------------------------------------------------------------------------------              8.9    15.84 >-----------<  327      [01-01-20 @ 00:42]              28.2     133  |  97  |  21  ----------------------------<  138      [01-01-20 @ 00:42]  4.0   |  30  |  0.48        Ca     7.8     [01-01-20 @ 00:42]      Mg     1.7     [01-01-20 @ 00:42]      Phos  2.9     [01-01-20 @ 00:42]    TPro  5.3  /  Alb  2.4  /  TBili  0.4  /  DBili  0.2  /  AST  14  /  ALT  14  /  AlkPhos  76  [01-01-20 @ 00:42]          Blood Gas Calcium, Ionized - Venous: 1.13 mmoL/L (12-31-19 @ 02:30)    Glucose, Serum: 138 mg/dL (01-01-20 @ 00:42)    Prealbumin, Serum: 16 mg/dL (01-01-20 @ 02:48)  Prealbumin, Serum: 15 mg/dL (12-31-19 @ 03:11)  Prealbumin, Serum: 16 mg/dL (12-30-19 @ 07:53)  Prealbumin, Serum: 13 mg/dL (12-25-19 @ 04:24)  Prealbumin, Serum: 14 mg/dL (12-24-19 @ 02:35)    12-31-19 @ 07:01  -  01-01-20 @ 07:00  --------------------------------------------------------  IN: 4450.4 mL / OUT: 4180 mL / NET: 270.4 mL        VITALS/PHYSICAL EXAM  --------------------------------------------------------------------------------  T(C): 37 (01-01-20 @ 07:00), Max: 37.1 (12-31-19 @ 23:00)  HR: 80 (01-01-20 @ 07:00) (74 - 91)  BP: 153/74 (01-01-20 @ 07:00) (85/50 - 154/74)  RR: 21 (01-01-20 @ 07:00) (16 - 33)  SpO2: 96% (01-01-20 @ 07:00) (93% - 100%)  Wt(kg): --    Physical Exam:    	Gen: guarded but stable, A&Ox3  	HEENT: NC/AT, PERRL,                Neck: trachea midline no JVD               Chest: non labored breathing  	 Abd: softly distended, non tender, midline incision w/staples yellowish exudate and erythematous along midline open to air, non smelly, (+)ostomy pink viable- thick bilious liquid stool like material, flatus present.              MSK: WWP no edema noted in upper nor lower extrem.  Rt PICC dressing c/d/I   	Neuro: No focal deficits, intact sensation, weakened strength  	Psych: Normal affect and mood, pleasant  	Skin: Warm, without rashes, good turgor [facility] NUTRITION SUPPORT--  Attending/ PA FOLLOW UP NOTE      24 hour events/subjective: Pt reports all his trays, without issues, and tolerating TPN: Denies Palpitations, chest pain, shortness of breath. Denies nausea nor vomiting nor abdominal pain. Denies f/c/NS.    - Given troponin elevation, TTE was done: EF: 50-55% with no acute cardiac pathology noted  - Tolerated Dysphagia diet and thickened non-alcoholic apple cider in company of staff during midnight celebratory countdown.   - Ostomy output increased now that patient is consuming food    PAST HISTORY  --------------------------------------------------------------------------------  No significant changes to PMH, PSH, FHx, SHx, unless otherwise noted    ALLERGIES & MEDICATIONS  --------------------------------------------------------------------------------  Allergies    IV Contrast (Hives)    Intolerances      Standing Inpatient Medications  acyclovir   Oral Tab/Cap 400 milliGRAM(s) Oral three times a day  albuterol/ipratropium for Nebulization 3 milliLiter(s) Nebulizer every 6 hours  buDESOnide    Inhalation Suspension 0.5 milliGRAM(s) Inhalation every 12 hours  ceFAZolin   IVPB      ceFAZolin   IVPB 1000 milliGRAM(s) IV Intermittent every 8 hours  chlorhexidine 2% Cloths 1 Application(s) Topical <User Schedule>  diphenoxylate/atropine 2 Tablet(s) Oral <User Schedule>  enoxaparin Injectable 40 milliGRAM(s) SubCutaneous daily  fat emulsion (Fish Oil and Plant Based) 20% Infusion 20.8 mL/Hr IV Continuous <Continuous>  insulin lispro (HumaLOG) corrective regimen sliding scale   SubCutaneous three times a day before meals  insulin lispro (HumaLOG) corrective regimen sliding scale   SubCutaneous at bedtime  loperamide 16 milliGRAM(s) Oral <User Schedule>  octreotide  Injectable 100 MICROGram(s) SubCutaneous three times a day  opium Tincture 6 milliGRAM(s) Oral <User Schedule>  pantoprazole  Injectable 40 milliGRAM(s) IV Push daily  Parenteral Nutrition - Adult 1 Each TPN Continuous <Continuous>  sodium chloride 0.9%. 1000 milliLiter(s) IV Continuous <Continuous>    PRN Inpatient Medications  ondansetron Injectable 4 milliGRAM(s) IV Push every 6 hours PRN      REVIEW OF SYSTEMS  --------------------------------------------------------------------------------  Gen: as per HPI  Skin: No rashes  Head/Eyes/Ears/Mouth: No headache;No sore throat  Respiratory: No dyspnea, cough,   CV: No chest pain, PND, orthopnea  GI: as per HPI  : No increased frequency, dysuria, hematuria, nocturia  MSK: No joint pain/swelling; no back pain; no edema  Neuro: No dizziness/lightheadedness, weakness, seizures, numbness, tingling  Psych: No significant nervousness, anxiety, stress, depression    All other systems were reviewed and are negative, except as noted.      LABS/STUDIES  --------------------------------------------------------------------------------              8.9    15.84 >-----------<  327      [01-01-20 @ 00:42]              28.2     133  |  97  |  21  ----------------------------<  138      [01-01-20 @ 00:42]  4.0   |  30  |  0.48        Ca     7.8     [01-01-20 @ 00:42]      Mg     1.7     [01-01-20 @ 00:42]      Phos  2.9     [01-01-20 @ 00:42]    TPro  5.3  /  Alb  2.4  /  TBili  0.4  /  DBili  0.2  /  AST  14  /  ALT  14  /  AlkPhos  76  [01-01-20 @ 00:42]          Blood Gas Calcium, Ionized - Venous: 1.13 mmoL/L (12-31-19 @ 02:30)    Glucose, Serum: 138 mg/dL (01-01-20 @ 00:42)    Prealbumin, Serum: 16 mg/dL (01-01-20 @ 02:48)  Prealbumin, Serum: 15 mg/dL (12-31-19 @ 03:11)  Prealbumin, Serum: 16 mg/dL (12-30-19 @ 07:53)  Prealbumin, Serum: 13 mg/dL (12-25-19 @ 04:24)  Prealbumin, Serum: 14 mg/dL (12-24-19 @ 02:35)    12-31-19 @ 07:01  -  01-01-20 @ 07:00  --------------------------------------------------------  IN: 4450.4 mL / OUT: 4180 mL / NET: 270.4 mL        VITALS/PHYSICAL EXAM  --------------------------------------------------------------------------------  T(C): 37 (01-01-20 @ 07:00), Max: 37.1 (12-31-19 @ 23:00)  HR: 80 (01-01-20 @ 07:00) (74 - 91)  BP: 153/74 (01-01-20 @ 07:00) (85/50 - 154/74)  RR: 21 (01-01-20 @ 07:00) (16 - 33)  SpO2: 96% (01-01-20 @ 07:00) (93% - 100%)  Wt(kg): --    Physical Exam:    	Gen: guarded but stable, A&Ox3  	HEENT: NC/AT, PERRL,                Neck: trachea midline no JVD               Chest: non labored breathing  	 Abd: softly distended, non tender, midline incision w/staples yellowish exudate and erythematous along midline open to air, non smelly, (+)ostomy pink viable- thick bilious liquid stool like material, flatus present.              MSK: WWP no edema noted in upper nor lower extrem.  Rt PICC dressing c/d/I   	Neuro: No focal deficits, intact sensation, weakened strength  	Psych: Normal affect and mood, pleasant  	Skin: Warm, without rashes, good turgor

## 2020-01-01 NOTE — PROGRESS NOTE ADULT - ASSESSMENT
75 y/o M presenting with septic shock and high grade SBO s/p exploratory laparotomy, lysis of adhesions, decompression of bowel via enterotomy w/ primary repair, and Abthera VAC placement on 12/6; s/p take down of Abthera, washout and re-application of the Abthera vac on 12/8. Now s/p SBR and end ileostomy on 12/10 acute respiratory distress now improving, Pneumothorax, hyperglycemia, delirium.    PLAN:    Neuro: A&Ox3   - Tylenol if needed for pain    Resp: acute respiratory failure upon initial presentation, COPD at baseline, spontaneous left sided pneumothorax s/p pig-tail catheter placement.   - Continue to monitor on RA  - Continue Duonebs, budesonide  - Continue left sided pig-tail to suction     CV: septic shock requiring vasopressor support, now with intermittent hypotension   - Continuing to hold metoprolol in setting of hypotension     GI: SBO s/p exploratory laparotomy, lysis of adhesions, decompression of bowel via enterotomy w/ primary repair, and Abthera VAC placement and removal with 150 cm of small bowel removed and end ileostomy.   - Regular diet with TPN (calorie count)  - Monitor ostomy output, lomotil 2 tabs q6hrs & Imodium 16 mg q6hrs & tincture of opium 6 mg q6hrs, octreotide 100mcg SQ TID  - Continue 1:1 repletions with ostomy     Renal: CHI, resolved   - Replete electrolytes as needed  - Monitor I/Os    Heme: no acute issues  - Lovenox for VTE prophylaxis  - SCDs     ID: No active issues   - Trend WBC, monitor fever curve    Endo: no acute issues  - SSI, q6 fingersticks    Dispo: SICU full code

## 2020-01-01 NOTE — PROGRESS NOTE ADULT - ASSESSMENT
Assessment   73 y/o M presenting with septic shock and high grade SBO s/p exploratory laparotomy, lysis of adhesions, decompression of bowel via enterotomy w/ primary repair, and Abthera VAC placement on 12/6; s/p take down of Abthera, washout and re-application of the Abthera vac on 12/8. Now s/p SBR and end ileostomy on 12/10 acute respiratory distress now improving, Pneumothorax, hyperglycemia, delirium. s/p L chest tube placement by IR 12/30 for pleural effusion.    Recommendations:  - monitor chest tube output   - c/w abx for wound infection  - cont tpn, monitor ostomy output  - no surgical intervention at this time for high ostomy output - too early for reconnection.   - continue excellent supportive care per SICU    RED SURGERY  [id number]

## 2020-01-01 NOTE — PROGRESS NOTE ADULT - SUBJECTIVE AND OBJECTIVE BOX
Subjective: Patient seen and examined. No new events except as noted.   remains in ICU   Chest tube remains   no cp or sob   24 HOUR EVENTS:  - Given troponin elevation, TTE was done: EF: 50-55% with no acute cardiac pathology noted  - Tolerated Dysphagia diet and thickened non-alcoholic apple cider in company of staff during midnight celebratory countdown.   - Ostomy output increased now that patient is consuming food    REVIEW OF SYSTEMS:    + weakness, fevers or chills  EYES/ENT: No visual changes;  No vertigo or throat pain   NECK: No pain or stiffness  RESPIRATORY: No cough, wheezing, hemoptysis; No shortness of breath  CARDIOVASCULAR: No chest pain or palpitations  GASTROINTESTINAL: No abdominal or epigastric pain. No nausea, vomiting, or hematemesis; No diarrhea or constipation. No melena or hematochezia.  GENITOURINARY: No dysuria, frequency or hematuria  NEUROLOGICAL: No numbness or weakness  SKIN: No itching, burning, rashes, or lesions   All other review of systems is negative unless indicated above.    MEDICATIONS:  MEDICATIONS  (STANDING):  acyclovir   Oral Tab/Cap 400 milliGRAM(s) Oral three times a day  albuterol/ipratropium for Nebulization 3 milliLiter(s) Nebulizer every 6 hours  buDESOnide    Inhalation Suspension 0.5 milliGRAM(s) Inhalation every 12 hours  ceFAZolin   IVPB      ceFAZolin   IVPB 1000 milliGRAM(s) IV Intermittent every 8 hours  chlorhexidine 2% Cloths 1 Application(s) Topical <User Schedule>  diphenoxylate/atropine 2 Tablet(s) Oral <User Schedule>  enoxaparin Injectable 40 milliGRAM(s) SubCutaneous daily  insulin lispro (HumaLOG) corrective regimen sliding scale   SubCutaneous three times a day before meals  insulin lispro (HumaLOG) corrective regimen sliding scale   SubCutaneous at bedtime  loperamide 16 milliGRAM(s) Oral <User Schedule>  octreotide  Injectable 100 MICROGram(s) SubCutaneous three times a day  opium Tincture 6 milliGRAM(s) Oral <User Schedule>  pantoprazole  Injectable 40 milliGRAM(s) IV Push daily  Parenteral Nutrition - Adult 1 Each (75 mL/Hr) TPN Continuous <Continuous>  sodium chloride 0.9%. 1000 milliLiter(s) (10 mL/Hr) IV Continuous <Continuous>      PHYSICAL EXAM:  T(C): 37 (01-01-20 @ 07:00), Max: 37.1 (12-31-19 @ 23:00)  HR: 80 (01-01-20 @ 07:00) (75 - 91)  BP: 153/74 (01-01-20 @ 07:00) (85/50 - 154/74)  RR: 21 (01-01-20 @ 07:00) (16 - 33)  SpO2: 96% (01-01-20 @ 07:00) (93% - 100%)  Wt(kg): --  I&O's Summary    31 Dec 2019 07:01  -  01 Jan 2020 07:00  --------------------------------------------------------  IN: 4450.4 mL / OUT: 4180 mL / NET: 270.4 mL              Appearance: NAD   HEENT:   Dry oral mucosa, crusted lips   Lymphatic: No lymphadenopathy   Cardiovascular: Normal S1 S2, No JVD, No murmurs , Peripheral pulses palpable 2+ bilaterally  Respiratory: Decreased bs   Gastrointestinal:  Soft, NT, ND. Ostomy pink with output. Midline staples in place, midline incision site is c/d/i   Skin: No rashes, No ecchymoses, No cyanosis, warm to touch  Musculoskeletal: Decreased  range of motion and strength  Psychiatry:  mildly sedated   Ext: No edema +PICC line   +rosas   +rectal tube       LABS:    CARDIAC MARKERS:                                8.9    15.84 )-----------( 327      ( 01 Jan 2020 00:42 )             28.2     01-01    133<L>  |  97  |  21  ----------------------------<  138<H>  4.0   |  30  |  0.48<L>    Ca    7.8<L>      01 Jan 2020 00:42  Phos  2.9     01-01  Mg     1.7     01-01    TPro  5.3<L>  /  Alb  2.4<L>  /  TBili  0.4  /  DBili  0.2  /  AST  14  /  ALT  14  /  AlkPhos  76  01-01    proBNP:   Lipid Profile:   HgA1c:   TSH:             TELEMETRY: 	SR    ECG:  	  RADIOLOGY:   DIAGNOSTIC TESTING:  [ ] Echocardiogram:  < from: Limited Transthoracic Echo (12.31.19 @ 12:00) >    Patient name: GAURAV WILLIS  YOB: 1945   Age: 74 (M)   MR#: [id number]  Study Date: 12/31/2019  Location: [address] [place], [place] [id number]: Zahraa Patel [facility]  Study quality: Technically difficult  Referring Physician: Irwin Siegel MD  Blood Pressure: 94/50 mmHg  Height: 175 cm  Weight: 54 kg  BSA: 1.7 m2  ------------------------------------------------------------------------  PROCEDURE: Limited transthoracic echocardiogram with 2-D.  M-Mode and spectral and color flow Doppler.  INDICATION: Cardiomyopathy, unspecified (I42.9)  ------------------------------------------------------------------------  Dimensions:    Normal Values:  LA:     2.9    2.0 - 4.0 cm  Ao:     2.9    2.0 - 3.8 cm  SEPTUM: 0.6    0.6 - 1.2 cm  PWT:    0.7    0.6 - 1.1 cm  LVIDd:  5.1    3.0 - 5.6 cm  LVIDs:  3.3    1.8 - 4.0 cm  Derived variables:  LVMI: 65 g/m2  RWT: 0.27  Fractional short: 35 %  EF (Visual Estimate): 50-55 %  ------------------------------------------------------------------------  Observations:  Mitral Valve: Mitral annular calcification.  Aortic Valve/Aorta: Aortic valve not well visualized;  appears calcified with normal opening.  Aortic Root: 2.9 cm.  Left Atrium: Normal left atrium.  Left Ventricle: Endocardium not well visualized; grossly  normal left ventricular systolic function. Cannot exclude  small segmental wall motion abnormalities.  Normal left  ventricular internal dimensions and wall thicknesses.  Right Heart: Right atrium not well visualized, probably  normal. Normal right ventricular size and function.  Tricuspid valve not well visualized. Minimal tricuspid  regurgitation. Pulmonic valve not well visualized.  Pericardium/Pleura: Normal pericardium with no pericardial  effusion.  Hemodynamic: Estimated rightatrial pressure is 8 mm Hg.  ------------------------------------------------------------------------  Conclusions:  1. Mitral annular calcification.  2. Aortic valve not well visualized; appears calcified with  normal opening.  3. Endocardium not well visualized; grossly normal left  ventricular systolic function. Cannot exclude small  segmental wall motion abnormalities.  4. Normal right ventricular size and function.  ------------------------------------------------------------------------  Confirmed on  12/31/2019 - 14:39:18 by Cali Richmond MD  ------------------------------------------------------------------------    < end of copied text >    [ ]  Catheterization:  [ ] Stress Test:    OTHER:

## 2020-01-02 DIAGNOSIS — J90 PLEURAL EFFUSION, NOT ELSEWHERE CLASSIFIED: ICD-10-CM

## 2020-01-02 LAB
ALBUMIN SERPL ELPH-MCNC: 2.1 G/DL — LOW (ref 3.3–5)
ALP SERPL-CCNC: 73 U/L — SIGNIFICANT CHANGE UP (ref 40–120)
ALT FLD-CCNC: 11 U/L — SIGNIFICANT CHANGE UP (ref 10–45)
ANION GAP SERPL CALC-SCNC: 9 MMOL/L — SIGNIFICANT CHANGE UP (ref 5–17)
AST SERPL-CCNC: 10 U/L — SIGNIFICANT CHANGE UP (ref 10–40)
BILIRUB DIRECT SERPL-MCNC: 0.2 MG/DL — SIGNIFICANT CHANGE UP (ref 0–0.2)
BILIRUB INDIRECT FLD-MCNC: 0.2 MG/DL — SIGNIFICANT CHANGE UP (ref 0.2–1)
BILIRUB SERPL-MCNC: 0.4 MG/DL — SIGNIFICANT CHANGE UP (ref 0.2–1.2)
BUN SERPL-MCNC: 16 MG/DL — SIGNIFICANT CHANGE UP (ref 7–23)
CALCIUM SERPL-MCNC: 7.2 MG/DL — LOW (ref 8.4–10.5)
CHLORIDE SERPL-SCNC: 103 MMOL/L — SIGNIFICANT CHANGE UP (ref 96–108)
CO2 SERPL-SCNC: 26 MMOL/L — SIGNIFICANT CHANGE UP (ref 22–31)
CREAT SERPL-MCNC: 0.44 MG/DL — LOW (ref 0.5–1.3)
GLUCOSE BLDC GLUCOMTR-MCNC: 115 MG/DL — HIGH (ref 70–99)
GLUCOSE BLDC GLUCOMTR-MCNC: 119 MG/DL — HIGH (ref 70–99)
GLUCOSE BLDC GLUCOMTR-MCNC: 122 MG/DL — HIGH (ref 70–99)
GLUCOSE BLDC GLUCOMTR-MCNC: 131 MG/DL — HIGH (ref 70–99)
GLUCOSE SERPL-MCNC: 150 MG/DL — HIGH (ref 70–99)
HCT VFR BLD CALC: 25.6 % — LOW (ref 39–50)
HGB BLD-MCNC: 8.1 G/DL — LOW (ref 13–17)
MAGNESIUM SERPL-MCNC: 1.8 MG/DL — SIGNIFICANT CHANGE UP (ref 1.6–2.6)
MCHC RBC-ENTMCNC: 30 PG — SIGNIFICANT CHANGE UP (ref 27–34)
MCHC RBC-ENTMCNC: 31.6 GM/DL — LOW (ref 32–36)
MCV RBC AUTO: 94.8 FL — SIGNIFICANT CHANGE UP (ref 80–100)
NON-GYNECOLOGICAL CYTOLOGY STUDY: SIGNIFICANT CHANGE UP
NRBC # BLD: 0 /100 WBCS — SIGNIFICANT CHANGE UP (ref 0–0)
PHOSPHATE SERPL-MCNC: 2.8 MG/DL — SIGNIFICANT CHANGE UP (ref 2.5–4.5)
PLATELET # BLD AUTO: 310 K/UL — SIGNIFICANT CHANGE UP (ref 150–400)
POTASSIUM SERPL-MCNC: 3.9 MMOL/L — SIGNIFICANT CHANGE UP (ref 3.5–5.3)
POTASSIUM SERPL-SCNC: 3.9 MMOL/L — SIGNIFICANT CHANGE UP (ref 3.5–5.3)
PROT SERPL-MCNC: 4.9 G/DL — LOW (ref 6–8.3)
RBC # BLD: 2.7 M/UL — LOW (ref 4.2–5.8)
RBC # FLD: 14.9 % — HIGH (ref 10.3–14.5)
SODIUM SERPL-SCNC: 138 MMOL/L — SIGNIFICANT CHANGE UP (ref 135–145)
WBC # BLD: 15.89 K/UL — HIGH (ref 3.8–10.5)
WBC # FLD AUTO: 15.89 K/UL — HIGH (ref 3.8–10.5)

## 2020-01-02 PROCEDURE — 99231 SBSQ HOSP IP/OBS SF/LOW 25: CPT

## 2020-01-02 PROCEDURE — 71045 X-RAY EXAM CHEST 1 VIEW: CPT | Mod: 26

## 2020-01-02 PROCEDURE — 99232 SBSQ HOSP IP/OBS MODERATE 35: CPT

## 2020-01-02 RX ORDER — MAGNESIUM SULFATE 500 MG/ML
2 VIAL (ML) INJECTION ONCE
Refills: 0 | Status: COMPLETED | OUTPATIENT
Start: 2020-01-02 | End: 2020-01-02

## 2020-01-02 RX ORDER — SODIUM CHLORIDE 9 MG/ML
1000 INJECTION INTRAMUSCULAR; INTRAVENOUS; SUBCUTANEOUS
Refills: 0 | Status: DISCONTINUED | OUTPATIENT
Start: 2020-01-02 | End: 2020-01-04

## 2020-01-02 RX ORDER — ELECTROLYTE SOLUTION,INJ
1 VIAL (ML) INTRAVENOUS
Refills: 0 | Status: DISCONTINUED | OUTPATIENT
Start: 2020-01-02 | End: 2020-01-03

## 2020-01-02 RX ORDER — I.V. FAT EMULSION 20 G/100ML
20.8 EMULSION INTRAVENOUS
Qty: 50 | Refills: 0 | Status: DISCONTINUED | OUTPATIENT
Start: 2020-01-02 | End: 2020-01-03

## 2020-01-02 RX ORDER — POTASSIUM CHLORIDE 20 MEQ
20 PACKET (EA) ORAL ONCE
Refills: 0 | Status: COMPLETED | OUTPATIENT
Start: 2020-01-02 | End: 2020-01-02

## 2020-01-02 RX ORDER — POTASSIUM PHOSPHATE, MONOBASIC POTASSIUM PHOSPHATE, DIBASIC 236; 224 MG/ML; MG/ML
15 INJECTION, SOLUTION INTRAVENOUS ONCE
Refills: 0 | Status: COMPLETED | OUTPATIENT
Start: 2020-01-02 | End: 2020-01-02

## 2020-01-02 RX ADMIN — Medication 100 MILLIGRAM(S): at 05:00

## 2020-01-02 RX ADMIN — Medication 400 MILLIGRAM(S): at 05:01

## 2020-01-02 RX ADMIN — Medication 1 EACH: at 17:26

## 2020-01-02 RX ADMIN — MORPHINE 6 MILLIGRAM(S): 10 SOLUTION ORAL at 22:06

## 2020-01-02 RX ADMIN — Medication 2 TABLET(S): at 18:00

## 2020-01-02 RX ADMIN — Medication 100 MILLIGRAM(S): at 13:16

## 2020-01-02 RX ADMIN — SODIUM CHLORIDE 10 MILLILITER(S): 9 INJECTION INTRAMUSCULAR; INTRAVENOUS; SUBCUTANEOUS at 22:06

## 2020-01-02 RX ADMIN — Medication 16 MILLIGRAM(S): at 22:07

## 2020-01-02 RX ADMIN — Medication 50 GRAM(S): at 03:13

## 2020-01-02 RX ADMIN — MORPHINE 6 MILLIGRAM(S): 10 SOLUTION ORAL at 12:13

## 2020-01-02 RX ADMIN — Medication 16 MILLIGRAM(S): at 08:45

## 2020-01-02 RX ADMIN — OCTREOTIDE ACETATE 100 MICROGRAM(S): 200 INJECTION, SOLUTION INTRAVENOUS; SUBCUTANEOUS at 05:01

## 2020-01-02 RX ADMIN — Medication 3 MILLILITER(S): at 18:40

## 2020-01-02 RX ADMIN — POTASSIUM PHOSPHATE, MONOBASIC POTASSIUM PHOSPHATE, DIBASIC 62.5 MILLIMOLE(S): 236; 224 INJECTION, SOLUTION INTRAVENOUS at 03:13

## 2020-01-02 RX ADMIN — Medication 3 MILLILITER(S): at 05:08

## 2020-01-02 RX ADMIN — PANTOPRAZOLE SODIUM 40 MILLIGRAM(S): 20 TABLET, DELAYED RELEASE ORAL at 12:13

## 2020-01-02 RX ADMIN — SODIUM CHLORIDE 10 MILLILITER(S): 9 INJECTION INTRAMUSCULAR; INTRAVENOUS; SUBCUTANEOUS at 05:01

## 2020-01-02 RX ADMIN — Medication 2 TABLET(S): at 12:18

## 2020-01-02 RX ADMIN — OCTREOTIDE ACETATE 100 MICROGRAM(S): 200 INJECTION, SOLUTION INTRAVENOUS; SUBCUTANEOUS at 22:06

## 2020-01-02 RX ADMIN — Medication 2 TABLET(S): at 22:06

## 2020-01-02 RX ADMIN — MORPHINE 6 MILLIGRAM(S): 10 SOLUTION ORAL at 08:44

## 2020-01-02 RX ADMIN — Medication 16 MILLIGRAM(S): at 12:12

## 2020-01-02 RX ADMIN — CHLORHEXIDINE GLUCONATE 1 APPLICATION(S): 213 SOLUTION TOPICAL at 05:00

## 2020-01-02 RX ADMIN — ENOXAPARIN SODIUM 40 MILLIGRAM(S): 100 INJECTION SUBCUTANEOUS at 12:13

## 2020-01-02 RX ADMIN — Medication 0.5 MILLIGRAM(S): at 18:40

## 2020-01-02 RX ADMIN — Medication 2 TABLET(S): at 08:45

## 2020-01-02 RX ADMIN — SODIUM CHLORIDE 10 MILLILITER(S): 9 INJECTION INTRAMUSCULAR; INTRAVENOUS; SUBCUTANEOUS at 08:45

## 2020-01-02 RX ADMIN — Medication 16 MILLIGRAM(S): at 17:26

## 2020-01-02 RX ADMIN — Medication 20 MILLIEQUIVALENT(S): at 05:00

## 2020-01-02 RX ADMIN — OCTREOTIDE ACETATE 100 MICROGRAM(S): 200 INJECTION, SOLUTION INTRAVENOUS; SUBCUTANEOUS at 14:47

## 2020-01-02 RX ADMIN — MORPHINE 6 MILLIGRAM(S): 10 SOLUTION ORAL at 17:26

## 2020-01-02 RX ADMIN — I.V. FAT EMULSION 20.8 ML/HR: 20 EMULSION INTRAVENOUS at 17:27

## 2020-01-02 RX ADMIN — Medication 0.5 MILLIGRAM(S): at 05:08

## 2020-01-02 RX ADMIN — Medication 1 EACH: at 08:45

## 2020-01-02 NOTE — SWALLOW FEES ASSESSMENT ADULT - RECOMMENDED CONSISTENCY
Continue Dysphagia I with nectar thickened liquids.  MD/team Please enter the following as notes to RN: 1) Full supervision with meals, 2) Crush meds or provide via alternate source, 3) Small single bites and sips at slow rate, 4) Encourage successive swallows for oral and pharyngeal clearance, 5) Alternate food and liquids to aid in oral and pharyngeal clearance, 6) Aspiration precautions. Monitor for s/s aspiration/laryngeal penetration. If noted:  D/C p.o. intake, provide non-oral nutrition/hydration/meds Continue Dysphagia I with nectar thickened liquids. SMALL SINGLE SIPS  MD/team Please enter the following as notes to RN: 1) Full supervision with meals, 2) Crush meds or provide via alternate source, 3) Small single bites and sips at slow rate, 4) Encourage successive swallows for oral and pharyngeal clearance, 5) Alternate food and liquids to aid in oral and pharyngeal clearance, 6) Aspiration precautions. Monitor for s/s aspiration/laryngeal penetration. If noted:  D/C p.o. intake, provide non-oral nutrition/hydration/meds

## 2020-01-02 NOTE — SWALLOW FEES ASSESSMENT ADULT - SPECIFY REASON(S)
to assess the swallow mechanism; r/o aspiration
to further assess the swallow mechanism; r/o aspiration
to assess the swallow mechanism; r/o aspiration
to subjectively assess swallow safety/function; r/o dysphagia.

## 2020-01-02 NOTE — PROGRESS NOTE ADULT - PROBLEM SELECTOR PLAN 1
sp  IR placed lt pigtail sp  IR placed lt pigtail  keep lws  daily chest xray while pl tube in place  will follow

## 2020-01-02 NOTE — SWALLOW FEES ASSESSMENT ADULT - DIAGNOSTIC IMPRESSIONS
Pt presents with an oropharyngeal dysphagia notable for prolonged oral management for soft chewables, pharyngeal retention post swallow, and laryngeal penetration of thin liquids, and soft chewables. Pt with single episode of laryngeal penetration with nectar-thick liquids with trace laryngeal residue noted after consecutive drinks when used as a liquid wash post chewables. No laryngeal penetration visualized with single sips of nectar-thick liquids. Pt presents with an oropharyngeal dysphagia notable for prolonged oral management for soft chewables, and pharyngeal retention post swallow. Pt with single episode of laryngeal penetration with nectar-thick liquids with trace laryngeal residue noted after consecutive drinks when used as a liquid wash post chewables. No laryngeal penetration visualized with single sips of nectar-thick liquids. Pt presents with an oropharyngeal dysphagia notable for prolonged oral management for soft chewables, uncontrolled A-P spillover of thin liquids, and pharyngeal retention post swallow. Pt with single episode of laryngeal penetration with nectar-thick liquids with trace laryngeal residue noted after consecutive drinks when used as a liquid wash post chewables. No laryngeal penetration visualized with single sips of nectar-thick liquids. And no laryngeal penetration/aspiration noted across other trials. Pt with baseline cough noted in the absence of visualized laryngeal penetration or aspiration. Given Pt with mildly increased respiratory rate and effort, and somewhat more incoordinated swallow sequence with thin liquids than with nectar-thick liquids, would suggest more conservative management of liquids pending improvement in overall status, and monitor for upgrade by SLP at that time.

## 2020-01-02 NOTE — PROGRESS NOTE ADULT - ASSESSMENT
73 y/o M presenting with septic shock and high grade SBO s/p exploratory laparotomy, lysis of adhesions, decompression of bowel via enterotomy w/ primary repair, and Abthera VAC placement on 12/6; s/p take down of Abthera, washout and re-application of the Abthera vac on 12/8. Now s/p SBR and end ileostomy on 12/10 acute respiratory distress now improving, Pneumothorax, hyperglycemia, delirium. s/p L chest tube placement by IR 12/30 for pleural effusion.    Recommendations:  - c/w dysphagia diet, FEES today  - monitor chest tube output   - c/w abx for wound infection  - cont tpn, monitor ostomy output  - no surgical intervention at this time for high ostomy output - too early for reconnection. Continue repletions.  - continue excellent supportive care per SICU    RED SURGERY  [id number]

## 2020-01-02 NOTE — PROGRESS NOTE ADULT - SUBJECTIVE AND OBJECTIVE BOX
Subjective: Patient seen and examined. No new events except as noted.   Remains in ICU  resting comfortably   no cp or so b       REVIEW OF SYSTEMS:    CONSTITUTIONAL: + weakness, fevers or chills  EYES/ENT: No visual changes;  No vertigo or throat pain   NECK: No pain or stiffness  RESPIRATORY: No cough, wheezing, hemoptysis; No shortness of breath  CARDIOVASCULAR: No chest pain or palpitations  GASTROINTESTINAL: No abdominal or epigastric pain. No nausea, vomiting, or hematemesis; No diarrhea or constipation. No melena or hematochezia.  GENITOURINARY: No dysuria, frequency or hematuria  NEUROLOGICAL: No numbness or weakness  SKIN: No itching, burning, rashes, or lesions   All other review of systems is negative unless indicated above.    MEDICATIONS:  MEDICATIONS  (STANDING):  albuterol/ipratropium for Nebulization 3 milliLiter(s) Nebulizer every 6 hours  buDESOnide    Inhalation Suspension 0.5 milliGRAM(s) Inhalation every 12 hours  ceFAZolin   IVPB      ceFAZolin   IVPB 1000 milliGRAM(s) IV Intermittent every 8 hours  chlorhexidine 2% Cloths 1 Application(s) Topical <User Schedule>  diphenoxylate/atropine 2 Tablet(s) Oral <User Schedule>  enoxaparin Injectable 40 milliGRAM(s) SubCutaneous daily  fat emulsion (Fish Oil and Plant Based) 20% Infusion 20.8 mL/Hr (20.8 mL/Hr) IV Continuous <Continuous>  insulin lispro (HumaLOG) corrective regimen sliding scale   SubCutaneous three times a day before meals  insulin lispro (HumaLOG) corrective regimen sliding scale   SubCutaneous at bedtime  loperamide 16 milliGRAM(s) Oral <User Schedule>  octreotide  Injectable 100 MICROGram(s) SubCutaneous three times a day  opium Tincture 6 milliGRAM(s) Oral <User Schedule>  pantoprazole  Injectable 40 milliGRAM(s) IV Push daily  Parenteral Nutrition - Adult 1 Each (75 mL/Hr) TPN Continuous <Continuous>  Parenteral Nutrition - Adult 1 Each (75 mL/Hr) TPN Continuous <Continuous>  sodium chloride 0.9%. 1000 milliLiter(s) (10 mL/Hr) IV Continuous <Continuous>      PHYSICAL EXAM:  T(C): 37.4 (01-02-20 @ 11:00), Max: 37.4 (01-01-20 @ 19:00)  HR: 93 (01-02-20 @ 11:00) (77 - 121)  BP: 126/66 (01-02-20 @ 11:00) (86/52 - 136/63)  RR: 24 (01-02-20 @ 11:00) (18 - 42)  SpO2: 92% (01-02-20 @ 11:00) (91% - 100%)  Wt(kg): --  I&O's Summary    01 Jan 2020 07:01  -  02 Jan 2020 07:00  --------------------------------------------------------  IN: 4430.4 mL / OUT: 3400 mL / NET: 1030.4 mL    02 Jan 2020 07:01  -  02 Jan 2020 12:07  --------------------------------------------------------  IN: 600 mL / OUT: 900 mL / NET: -300 mL            Appearance: NAD   HEENT:   Dry oral mucosa, crusted lips   Lymphatic: No lymphadenopathy   Cardiovascular: Normal S1 S2, No JVD, No murmurs , Peripheral pulses palpable 2+ bilaterally  Respiratory: Decreased bs   Gastrointestinal:  Soft, NT, ND. Ostomy pink with output. Midline staples in place, midline incision site is c/d/i   Skin: No rashes, No ecchymoses, No cyanosis, warm to touch  Musculoskeletal: Decreased  range of motion and strength  Psychiatry:  mildly sedated   Ext: No edema +PICC line   +rosas   +rectal tube           LABS:    CARDIAC MARKERS:                                8.1    15.89 )-----------( 310      ( 02 Jan 2020 01:19 )             25.6     01-02    138  |  103  |  16  ----------------------------<  150<H>  3.9   |  26  |  0.44<L>    Ca    7.2<L>      02 Jan 2020 01:19  Phos  2.8     01-02  Mg     1.8     01-02    TPro  4.9<L>  /  Alb  2.1<L>  /  TBili  0.4  /  DBili  0.2  /  AST  10  /  ALT  11  /  AlkPhos  73  01-02    proBNP:   Lipid Profile:   HgA1c:   TSH:             TELEMETRY: SR	    ECG:  	  RADIOLOGY:   < from: Xray Chest 1 View- PORTABLE-Routine (01.02.20 @ 06:55) >    EXAM:  XR CHEST PORTABLE ROUTINE 1V                            PROCEDURE DATE:  01/02/2020            INTERPRETATION:  CLINICAL INFORMATION: Status post small bowel resection. Concern for pneumothorax.    EXAM: Frontal chest radiograph dated 1/2/2020.    COMPARISON: Chest radiograph from 1/1/2020.    FINDINGS:  Right PICC line terminates in the SVC.  Small bilateral pleural effusions. No pneumothorax.  The cardiomediastinal silhouette is within normal limits.    IMPRESSION:   Small bilateral pleural effusions. No pneumothorax.                JS PATEL M.D., RADIOLOGY RESIDENT  This document has been electronically signed.  ANNETTA FERREIRA M.D., ATTENDING RADIOLOGIST  This document has been electronically signed. Jan 2 2020 11:21AM               < end of copied text >    DIAGNOSTIC TESTING:  [ ] Echocardiogram:  [ ]  Catheterization:  [ ] Stress Test:    OTHER:

## 2020-01-02 NOTE — SWALLOW FEES ASSESSMENT ADULT - ROSENBEK'S PENETRATION ASPIRATION SCALE
(1) no aspiration, material does not enter airway (3) material remains above the vocal cords, visible residue remains (penetration)

## 2020-01-02 NOTE — SWALLOW FEES ASSESSMENT ADULT - PRELIMINARY ENDOSCOPIC EXAMINATIONS
Erythema posterior to L vocal vold on laryngeal surface of L arytenoid; ? bilateral ulcerations vs. other noted on posterior vocal folds

## 2020-01-02 NOTE — SWALLOW FEES ASSESSMENT ADULT - SLP GENERAL OBSERVATIONS
Pt seen for FEES in conjunction with pulmonary. Pt awake and alert, verbally responsive and appropriate, following directions for the purposes of the exam.

## 2020-01-02 NOTE — SWALLOW FEES ASSESSMENT ADULT - ADDITIONAL RECOMMENDATIONS
Maintain good oral hygiene.   This service will continue to follow. Suggest MBS prior to discharge to assess for possible upgrade pending improvement in functional status. Maintain good oral hygiene.   This service will continue to follow. Suggest consider MBS prior to discharge to assess for possible upgrade pending improvement in functional status, if cleared for barium intake at that time.

## 2020-01-02 NOTE — PROGRESS NOTE ADULT - SUBJECTIVE AND OBJECTIVE BOX
Interventional Radiology Follow- Up Note      74y Male s/p left CT placement on 12/30 in Interventional Radiology with Dr James.     Patient seen and examined @ bedside.     Vitals: T(F): 99.3 (01-02-20 @ 11:00), Max: 99.3 (01-01-20 @ 19:00)  HR: 93 (01-02-20 @ 11:00) (77 - 121)  BP: 126/66 (01-02-20 @ 11:00) (86/52 - 136/63)  RR: 24 (01-02-20 @ 11:00) (18 - 42)  SpO2: 92% (01-02-20 @ 11:00) (91% - 100%)  Wt(kg): --    LABS:                        8.1    15.89 )-----------( 310      ( 02 Jan 2020 01:19 )             25.6     01-02    138  |  103  |  16  ----------------------------<  150<H>  3.9   |  26  |  0.44<L>    Ca    7.2<L>      02 Jan 2020 01:19  Phos  2.8     01-02  Mg     1.8     01-02    TPro  4.9<L>  /  Alb  2.1<L>  /  TBili  0.4  /  DBili  0.2  /  AST  10  /  ALT  11  /  AlkPhos  73  01-02        EXAM: XR CHEST PORTABLE ROUTINE 1V       PROCEDURE DATE: 01/02/2020           INTERPRETATION: CLINICAL INFORMATION: Status post small bowel resection.   Concern for pneumothorax.     EXAM: Frontal chest radiograph dated 1/2/2020.     COMPARISON: Chest radiograph from 1/1/2020.     FINDINGS:   Right PICC line terminates in the SVC.   Small bilateral pleural effusions. No pneumothorax.   The cardiomediastinal silhouette is within normal limits.     IMPRESSION:   Small bilateral pleural effusions. No pneumothorax.     I&O's Detail    01 Jan 2020 07:01  -  02 Jan 2020 07:00  --------------------------------------------------------  IN:    fat emulsion (Fish Oil and Plant Based) 20% Infusion: 270.4 mL    Oral Fluid: 200 mL    sodium chloride 0.9%: 40 mL    sodium chloride 0.9%: 710 mL    sodium chloride 0.9%.: 10 mL    Solution: 50 mL    Solution: 850 mL    Solution: 500 mL    TPN (Total Parenteral Nutrition): 1800 mL  Total IN: 4430.4 mL    OUT:    Chest Tube: 500 mL    Ileostomy: 1450 mL    Voided: 1450 mL  Total OUT: 3400 mL    Total NET: 1030.4 mL      02 Jan 2020 07:01  -  02 Jan 2020 12:06  --------------------------------------------------------  IN:    Oral Fluid: 120 mL    sodium chloride 0.9%.: 180 mL    TPN (Total Parenteral Nutrition): 300 mL  Total IN: 600 mL    OUT:    Ileostomy: 450 mL    Voided: 450 mL  Total OUT: 900 mL    Total NET: -300 mL      PHYSICAL EXAM:  General: Nontoxic, in NAD  Neuro:  Alert & oriented x 3  Lung:  respirations nonlabored, good inspiratory effort  Extremities: no pedal edema or calf tenderness noted         Impression: 75 y/o M presenting with septic shock and high grade SBO s/p exploratory laparotomy, lysis of adhesions, decompression of bowel via enterotomy w/ primary repair, and Abthera VAC placement on 12/6; s/p take down of Abthera, washout and re-application of the Abthera vac on 12/8. Now s/p SBR and end ileostomy on 12/10 acute respiratory distress now improving,  Pt s/p left CT on 12/30.       Plan:  -continue to monitor output    -trend vitals, labs  -f/u final cx   -daily CXR  -further medical care per SICU team  -will discuss with IR attending     Please call IR at extension [phone] with any questions, concerns, or issues regarding above. Interventional Radiology Follow- Up Note      74y Male s/p left CT placement on 12/30 in Interventional Radiology with Dr James.     Patient seen and examined @ bedside.  CT continues to drain moderate to large amount, 500ml/24hr.      Vitals: T(F): 99.3 (01-02-20 @ 11:00), Max: 99.3 (01-01-20 @ 19:00)  HR: 93 (01-02-20 @ 11:00) (77 - 121)  BP: 126/66 (01-02-20 @ 11:00) (86/52 - 136/63)  RR: 24 (01-02-20 @ 11:00) (18 - 42)  SpO2: 92% (01-02-20 @ 11:00) (91% - 100%)  Wt(kg): --    LABS:                        8.1    15.89 )-----------( 310      ( 02 Jan 2020 01:19 )             25.6     01-02    138  |  103  |  16  ----------------------------<  150<H>  3.9   |  26  |  0.44<L>    Ca    7.2<L>      02 Jan 2020 01:19  Phos  2.8     01-02  Mg     1.8     01-02    TPro  4.9<L>  /  Alb  2.1<L>  /  TBili  0.4  /  DBili  0.2  /  AST  10  /  ALT  11  /  AlkPhos  73  01-02    EXAM: XR CHEST PORTABLE ROUTINE 1V   PROCEDURE DATE: 01/02/2020   INTERPRETATION: CLINICAL INFORMATION: Status post small bowel resection.   Concern for pneumothorax.     EXAM: Frontal chest radiograph dated 1/2/2020.     COMPARISON: Chest radiograph from 1/1/2020.     FINDINGS:   Right PICC line terminates in the SVC.   Small bilateral pleural effusions. No pneumothorax.   The cardiomediastinal silhouette is within normal limits.     IMPRESSION:   Small bilateral pleural effusions. No pneumothorax.     I&O's Detail    01 Jan 2020 07:01  -  02 Jan 2020 07:00  --------------------------------------------------------  IN:    fat emulsion (Fish Oil and Plant Based) 20% Infusion: 270.4 mL    Oral Fluid: 200 mL    sodium chloride 0.9%: 40 mL    sodium chloride 0.9%: 710 mL    sodium chloride 0.9%.: 10 mL    Solution: 50 mL    Solution: 850 mL    Solution: 500 mL    TPN (Total Parenteral Nutrition): 1800 mL  Total IN: 4430.4 mL    OUT:    Chest Tube: 500 mL    Ileostomy: 1450 mL    Voided: 1450 mL  Total OUT: 3400 mL    Total NET: 1030.4 mL      02 Jan 2020 07:01  -  02 Jan 2020 12:06  --------------------------------------------------------  IN:    Oral Fluid: 120 mL    sodium chloride 0.9%.: 180 mL    TPN (Total Parenteral Nutrition): 300 mL  Total IN: 600 mL    OUT:    Ileostomy: 450 mL    Voided: 450 mL  Total OUT: 900 mL    Total NET: -300 mL      PHYSICAL EXAM:  General: Nontoxic, in NAD  Neuro:  Alert & oriented x 3  Lung:  respirations nonlabored, good inspiratory effort, +productive cough, CT to LWS, dsg c/d/i, no SQ emphysema or air leak, serosanguinous fluid in Pleuravac  Extremities: no pedal edema or calf tenderness noted         Impression: 75 y/o M presenting with septic shock and high grade SBO s/p exploratory laparotomy, lysis of adhesions, decompression of bowel via enterotomy w/ primary repair, and Abthera VAC placement on 12/6; s/p take down of Abthera, washout and re-application of the Abthera vac on 12/8. Now s/p SBR and end ileostomy on 12/10 acute respiratory distress now improving. Pt s/p left CT on 12/30 in IR, now with continued drainage.       Plan:  -continue to monitor output    -trend vitals, labs  -f/u final cx   -daily CXR  -further medical care per SICU team  -will discuss with IR attending     Please call IR at extension [phone] with any questions, concerns, or issues regarding above. Interventional Radiology Follow- Up Note      74y Male s/p left CT placement on 12/30 in Interventional Radiology with Dr James.     Patient seen and examined @ bedside.  CT continues to drain moderate to large amount, 500ml/24hr.    Denies dyspnea, chest pain,  Currently on 3 L NC.    Vitals: T(F): 99.3 (01-02-20 @ 11:00), Max: 99.3 (01-01-20 @ 19:00)  HR: 93 (01-02-20 @ 11:00) (77 - 121)  BP: 126/66 (01-02-20 @ 11:00) (86/52 - 136/63)  RR: 24 (01-02-20 @ 11:00) (18 - 42)  SpO2: 92% (01-02-20 @ 11:00) (91% - 100%)  Wt(kg): --    LABS:                        8.1    15.89 )-----------( 310      ( 02 Jan 2020 01:19 )             25.6     01-02    138  |  103  |  16  ----------------------------<  150<H>  3.9   |  26  |  0.44<L>    Ca    7.2<L>      02 Jan 2020 01:19  Phos  2.8     01-02  Mg     1.8     01-02    TPro  4.9<L>  /  Alb  2.1<L>  /  TBili  0.4  /  DBili  0.2  /  AST  10  /  ALT  11  /  AlkPhos  73  01-02    EXAM: XR CHEST PORTABLE ROUTINE 1V   PROCEDURE DATE: 01/02/2020   INTERPRETATION: CLINICAL INFORMATION: Status post small bowel resection.   Concern for pneumothorax.     EXAM: Frontal chest radiograph dated 1/2/2020.     COMPARISON: Chest radiograph from 1/1/2020.     FINDINGS:   Right PICC line terminates in the SVC.   Small bilateral pleural effusions. No pneumothorax.   The cardiomediastinal silhouette is within normal limits.     IMPRESSION:   Small bilateral pleural effusions. No pneumothorax.     I&O's Detail    01 Jan 2020 07:01  -  02 Jan 2020 07:00  --------------------------------------------------------  IN:    fat emulsion (Fish Oil and Plant Based) 20% Infusion: 270.4 mL    Oral Fluid: 200 mL    sodium chloride 0.9%: 40 mL    sodium chloride 0.9%: 710 mL    sodium chloride 0.9%.: 10 mL    Solution: 50 mL    Solution: 850 mL    Solution: 500 mL    TPN (Total Parenteral Nutrition): 1800 mL  Total IN: 4430.4 mL    OUT:    Chest Tube: 500 mL    Ileostomy: 1450 mL    Voided: 1450 mL  Total OUT: 3400 mL    Total NET: 1030.4 mL      02 Jan 2020 07:01  -  02 Jan 2020 12:06  --------------------------------------------------------  IN:    Oral Fluid: 120 mL    sodium chloride 0.9%.: 180 mL    TPN (Total Parenteral Nutrition): 300 mL  Total IN: 600 mL    OUT:    Ileostomy: 450 mL    Voided: 450 mL  Total OUT: 900 mL    Total NET: -300 mL      PHYSICAL EXAM:  General: Nontoxic, in NAD  Neuro:  Alert & oriented x 3  Lung:  respirations nonlabored, good inspiratory effort, +productive cough, CT to LWS, dsg c/d/i, no SQ emphysema or air leak, serosanguinous fluid in Pleuravac  Extremities: no pedal edema or calf tenderness noted         Impression: 75 y/o M presenting with septic shock and high grade SBO s/p exploratory laparotomy, lysis of adhesions, decompression of bowel via enterotomy w/ primary repair, and Abthera VAC placement on 12/6; s/p take down of Abthera, washout and re-application of the Abthera vac on 12/8. Now s/p SBR and end ileostomy on 12/10 acute respiratory distress now improving. Pt s/p left CT on 12/30 in IR, now with continued drainage.       Plan:  -continue to monitor output    -trend vitals, labs  -f/u final cx   -daily CXR  -further medical care per SICU team  -will discuss with IR attending     Please call IR at extension [phone] with any questions, concerns, or issues regarding above.

## 2020-01-02 NOTE — SWALLOW FEES ASSESSMENT ADULT - LARYNGEAL PENETRATION DURING SWALLOW - SILENT
Trace/single episode, shallow, suspected over laryngeal surface of aryepiglottic fold during liquid wash post chewables.

## 2020-01-02 NOTE — SWALLOW FEES ASSESSMENT ADULT - COMMENTS
Hosp Course cont'd:  Pt seen for bedside swallow evaluation on 12/21 with recommendations for dysphagia 1 diet and nectar thickened liquids.  FEES was also recommended at that time to further assess swallow mechanism, but was deferred multiple times due to pt’s NPO status for tentative procedures.  12/24: started on acyclovir for concern for herpetic rash around mouth; pigtail removed. 12/27: CXR with unchanged loculated left pleural effusion -> s/p left chest tube insertion on 12/30/19 in Interventional Radiology. 12/30: pt hypotensive 79/41 -> bolus given. Pt with episode of brief, self-limiting epistaxis upon insertion of scope; mild bleeding into pharynx observed during early PO trials, appeared to have resolved within a few minutes, prior to end of study; no anterior spillage of blood observed. Pulm team aware and 8ICU team notified. Pt with episode of brief, self-limiting epistaxis upon insertion of scope; mild bleeding into pharynx observed during early PO trials, appeared to have resolved within a few minutes, prior to end of study; no anterior spillage of blood observed. Pulm team aware and 8ICU team notified.    Pt with increased RR during study (high 20s/low 30s), with mildly increased respiratory effort, but no c/o distress when asked. Suspect components of anxiety and discomfort due to presence of scope, in conjunction with baseline respiratory status (h/o COPD, and Chest tube in place for prior L pneumothorax and L pleural effusion).

## 2020-01-02 NOTE — SWALLOW FEES ASSESSMENT ADULT - RESIDUE OF POSTERIOR PHARYNGEAL WALL
stranding between superior laryngeal surface of epiglottis and posterior pharyngeal wall/Trace Trace/stranding between superior laryngeal surface of the epiglottis and posterior pharyngeal wall stranding between superior laryngeal surface of the epiglottis and posterior pharyngeal wall/Trace

## 2020-01-02 NOTE — PROGRESS NOTE ADULT - ASSESSMENT
75 y/o M presenting with septic shock and high grade SBO s/p exploratory laparotomy, lysis of adhesions, decompression of bowel via enterotomy w/ primary repair, and Abthera VAC placement on 12/6; s/p take down of Abthera, washout and re-application of the Abthera vac on 12/8. Now s/p SBR and end ileostomy on 12/10 acute respiratory distress now improving, Pneumothorax, hyperglycemia, delirium. Now still with persistent pneumothorax when chest pigtail clamped.   12/23 VSS on room air recommending IR drainage of left chest  12/24 No evidence of air leak to left chest tube. Tube clamped. Repeat chest cxr in 4 hours. Anticipate chest tube removal if lung remains expanded. Drainage of loculated left effusion discussed with IR. IR to reconsult for drainage once initial tube removed. Discussed plan with Dr Mayes and IR Fellow  12/24  On NC ,    VSSchest xray sm lt pl eff, left chest tube dc'd.  12/26    2l NC   co  sob,  lt side diminshed  12/27 VSS, CXR with unchanged loculated left pleural effusion- IR consulted for pigtail placement, states effusion can be drained via thoracentesis, pigtail placement not indicated at this time - Dr. Mayes to speak with IR attending.   12/30 Patient with persistent PTX  and left pleural effusion now for IR drainage with Dr Elkins.   12/31 HD stable, L PTC w/ high output, serosanguinous drainage, 990 ml overnight/ 2200 in 24 hrs. Tolerating 2L NC supplemental O2, Cyto pending, continue monitor drainage output.   1/1: LPTC still with High output-400/24h. Continue with pigtail cath drain.  1/ 2 73 y/o M presenting with septic shock and high grade SBO s/p exploratory laparotomy, lysis of adhesions, decompression of bowel via enterotomy w/ primary repair, and Abthera VAC placement on 12/6; s/p take down of Abthera, washout and re-application of the Abthera vac on 12/8. Now s/p SBR and end ileostomy on 12/10 acute respiratory distress now improving, Pneumothorax, hyperglycemia, delirium. Now still with persistent pneumothorax when chest pigtail clamped.   12/23 VSS on room air recommending IR drainage of left chest  12/24 No evidence of air leak to left chest tube. Tube clamped. Repeat chest cxr in 4 hours. Anticipate chest tube removal if lung remains expanded. Drainage of loculated left effusion discussed with IR. IR to reconsult for drainage once initial tube removed. Discussed plan with Dr Mayes and IR Fellow  12/24  On NC ,    VSSchest xray sm lt pl eff, left chest tube dc'd.  12/26    2l NC   co  sob,  lt side diminshed  12/27 VSS, CXR with unchanged loculated left pleural effusion- IR consulted for pigtail placement, states effusion can be drained via thoracentesis, pigtail placement not indicated at this time - Dr. Mayes to speak with IR attending.   12/30 Patient with persistent PTX  and left pleural effusion now for IR drainage with Dr Elkins.   12/31 HD stable, L PTC w/ high output, serosanguinous drainage, 990 ml overnight/ 2200 in 24 hrs. Tolerating 2L NC supplemental O2, Cyto pending, continue monitor drainage output.   1/1: LPTC still with High output-400/24h. Continue with pigtail cath drain.  1/ 2     lt pigtail draining  on lws

## 2020-01-02 NOTE — PROGRESS NOTE ADULT - SUBJECTIVE AND OBJECTIVE BOX
RED SURGERY DAILY PROGRESS NOTE      SUBJECTIVE/ROS: Patient seen and examined at bedside.  Patients pain is controlled, reports adequate respiratory status.  He has been tolerating a regular diet without n/v.  Ostomy output 1450cc over 24h, 1:1 repletions given.  L. chest tube with 500cc output/24h.     24 HOUR EVENTS  - had one episode of hypotension when ooB to chair, self resolved once patient back in bed  - repletions 1cc:1cc switched to Q4     MEDICATIONS  (STANDING):  albuterol/ipratropium for Nebulization 3 milliLiter(s) Nebulizer every 6 hours  buDESOnide    Inhalation Suspension 0.5 milliGRAM(s) Inhalation every 12 hours  ceFAZolin   IVPB      ceFAZolin   IVPB 1000 milliGRAM(s) IV Intermittent every 8 hours  chlorhexidine 2% Cloths 1 Application(s) Topical <User Schedule>  diphenoxylate/atropine 2 Tablet(s) Oral <User Schedule>  enoxaparin Injectable 40 milliGRAM(s) SubCutaneous daily  fat emulsion (Fish Oil and Plant Based) 20% Infusion 20.8 mL/Hr (20.8 mL/Hr) IV Continuous <Continuous>  insulin lispro (HumaLOG) corrective regimen sliding scale   SubCutaneous three times a day before meals  insulin lispro (HumaLOG) corrective regimen sliding scale   SubCutaneous at bedtime  loperamide 16 milliGRAM(s) Oral <User Schedule>  octreotide  Injectable 100 MICROGram(s) SubCutaneous three times a day  opium Tincture 6 milliGRAM(s) Oral <User Schedule>  pantoprazole  Injectable 40 milliGRAM(s) IV Push daily  Parenteral Nutrition - Adult 1 Each (75 mL/Hr) TPN Continuous <Continuous>  sodium chloride 0.9%. 1000 milliLiter(s) (10 mL/Hr) IV Continuous <Continuous>    MEDICATIONS  (PRN):  ondansetron Injectable 4 milliGRAM(s) IV Push every 6 hours PRN Nausea and/or Vomiting      OBJECTIVE:    Vital Signs Last 24 Hrs  T(C): 36.4 (2020 07:00), Max: 37.4 (2020 19:00)  T(F): 97.5 (2020 07:00), Max: 99.3 (2020 19:00)  HR: 86 (2020 07:00) (77 - 121)  BP: 103/55 (2020 07:00) (86/52 - 163/84)  BP(mean): 73 (2020 07:00) (65 - 115)  RR: 22 (2020 07:00) (18 - 42)  SpO2: 94% (2020 07:00) (91% - 100%)        I&O's Detail    2020 07:01  -  2020 07:00  --------------------------------------------------------  IN:    fat emulsion (Fish Oil and Plant Based) 20% Infusion: 270.4 mL    Oral Fluid: 200 mL    sodium chloride 0.9%: 40 mL    sodium chloride 0.9%: 710 mL    Solution: 850 mL    Solution: 500 mL    Solution: 50 mL    TPN (Total Parenteral Nutrition): 1800 mL  Total IN: 4420.4 mL    OUT:    Chest Tube: 500 mL    Ileostomy: 1450 mL    Voided: 1300 mL  Total OUT: 3250 mL    Total NET: 1170.4 mL          Daily     Daily Weight in k.7 (2020 01:00)    LABS:                        8.1    15.89 )-----------( 310      ( 2020 01:19 )             25.6         138  |  103  |  16  ----------------------------<  150<H>  3.9   |  26  |  0.44<L>    Ca    7.2<L>      2020 01:19  Phos  2.8       Mg     1.8         TPro  4.9<L>  /  Alb  2.1<L>  /  TBili  0.4  /  DBili  0.2  /  AST  10  /  ALT  11  /  AlkPhos  73                    PHYSICAL EXAM:  Constitutional: NAD  Respiratory: on 2L NC, stating 94%, minimal increased work of breathing noted.  L chest tube in place, negative for air leak.  Abd: soft, NT, ND, ostomy is pink, midline wound with non-expanding surrounding erythema   Ext: moving all 4 ext spontaneously

## 2020-01-02 NOTE — SWALLOW FEES ASSESSMENT ADULT - NS SWALLOW FEES REC ASPIR MON
cough/upper respiratory infection/Monitor for s/s aspiration/laryngeal penetration. If noted:  D/C p.o. intake, provide non-oral nutrition/hydration/meds, and contact this service @ [phone]/change of breathing pattern/gurgly voice/fever/pneumonia/throat clearing

## 2020-01-02 NOTE — PROGRESS NOTE ADULT - SUBJECTIVE AND OBJECTIVE BOX
[facility] NUTRITION SUPPORT / TPN -- FOLLOW UP NOTE  --------------------------------------------------------------------------------    24 hour events/subjective:    Pt had Lt chest Pigtail placed ->2240mL/24h now 500mL/ 24hr  Pt for FEES today  Ancef for incisional cellulitis -stopping today  Tolerating TPN & eating dysphagia diet - eating well when allowed to eat  SOB improved w/ NC O2  Pt denies any pain/ dyspnea/ cough/ palp  no n/v   Decreased Ileostomy output noted- 1,450mL for last 24h       Continue Octreotide, lomotil, Imodium, & tincture of opium        PPI to help decrease gastric secretions  Hypotension after pleural effusion improved w/ fluid repletion      Troponin elevation -  TTE done & noted  No f/c/s      Diet:  Diet, Dysphagia 1 Pureed-Nectar Consistency Fluid (12-25-19 @ 15:39)      Appetite: [  ]Poor [  x]Adequate [  ]Good  Caloric intake:  [ x  ]  Adequate   [   ] Inadequate    ROS: General/ GI see HPI  all other systems negative      ALLERGIES & MEDICATIONS  --------------------------------------------------------------------------------  ALLERGIES  IV Contrast (Hives)    STANDING INPATIENT MEDICATIONS    albuterol/ipratropium for Nebulization 3 milliLiter(s) Nebulizer every 6 hours  buDESOnide    Inhalation Suspension 0.5 milliGRAM(s) Inhalation every 12 hours  chlorhexidine 2% Cloths 1 Application(s) Topical <User Schedule>  diphenoxylate/atropine 2 Tablet(s) Oral <User Schedule>  enoxaparin Injectable 40 milliGRAM(s) SubCutaneous daily  fat emulsion (Fish Oil and Plant Based) 20% Infusion 20.8 mL/Hr IV Continuous <Continuous>  insulin lispro (HumaLOG) corrective regimen sliding scale   SubCutaneous three times a day before meals  insulin lispro (HumaLOG) corrective regimen sliding scale   SubCutaneous at bedtime  loperamide 16 milliGRAM(s) Oral <User Schedule>  octreotide  Injectable 100 MICROGram(s) SubCutaneous three times a day  opium Tincture 6 milliGRAM(s) Oral <User Schedule>  pantoprazole  Injectable 40 milliGRAM(s) IV Push daily  Parenteral Nutrition - Adult 1 Each TPN Continuous <Continuous>  Parenteral Nutrition - Adult 1 Each TPN Continuous <Continuous>  sodium chloride 0.9%. 1000 milliLiter(s) IV Continuous <Continuous>      PRN INPATIENT MEDICATION  ondansetron Injectable 4 milliGRAM(s) IV Push every 6 hours PRN        VITALS/PHYSICAL EXAM  --------------------------------------------------------------------------------  T(C): 37.4 (01-02-20 @ 11:00), Max: 37.4 (01-01-20 @ 19:00)  HR: 89 (01-02-20 @ 14:00) (78 - 99)  BP: 89/51 (01-02-20 @ 14:00) (86/52 - 136/63)  RR: 29 (01-02-20 @ 14:00) (18 - 42)  SpO2: 95% (01-02-20 @ 14:00) (91% - 100%)  Wt(kg): --        01-01-20 @ 07:01  -  01-02-20 @ 07:00  --------------------------------------------------------  IN: 4430.4 mL / OUT: 3400 mL / NET: 1030.4 mL    01-02-20 @ 07:01  -  01-02-20 @ 15:03  --------------------------------------------------------  IN: 2505 mL / OUT: 2200 mL / NET: 305 mL    PHYSICAL EXAM  --------------------------------------------------------------------------------  	Gen: guarded but stable, A&Ox3, NC O2  	HEENT: NC/AT, PERRL, supple neck, clear oropharynx, dried ruptured blisters  	GI: (+) BS, softly distended, non tender                   midline incision w/staples c/d/i, pale pink, blanchable w/o drainage or tenderness                   (+)ostomy pink viable- thick bilious liquid stool like material              MSK: FROM, no contractures nor deformities  	Vascular: Equally Warm, no clubbing, cyanosis, nor edema                        Rt PICC dressing c/d/I   	Neuro: No focal deficits, intact sensation, weakened strength  	Psych: Normal affect and mood  	Skin: Warm, without rashes, good turgor        LABS/ CULTURES/ RADIOLOGY:              8.1    15.89 >-----------<  310      [01-02-20 @ 01:19]              25.6     138  |  103  |  16  ----------------------------<  150      [01-02-20 @ 01:19]  3.9   |  26  |  0.44        Ca     7.2     [01-02-20 @ 01:19]      Mg     1.8     [01-02-20 @ 01:19]      Phos  2.8     [01-02-20 @ 01:19]    TPro  4.9  /  Alb  2.1  /  TBili  0.4  /  DBili  0.2  /  AST  10  /  ALT  11  /  AlkPhos  73  [01-02-20 @ 01:19]        CAPILLARY BLOOD GLUCOSE  POCT Blood Glucose.: 115 mg/dL (02 Jan 2020 10:58)  POCT Blood Glucose.: 119 mg/dL (02 Jan 2020 08:04)  POCT Blood Glucose.: 125 mg/dL (01 Jan 2020 22:16)  POCT Blood Glucose.: 103 mg/dL (01 Jan 2020 16:21)    Prealbumin, Serum: 16 mg/dL (01-01-20 @ 02:48)  Prealbumin, Serum: 15 mg/dL (12-31-19 @ 03:11)  Prealbumin, Serum: 16 mg/dL (12-30-19 @ 07:53)  Prealbumin, Serum: 13 mg/dL (12-25-19 @ 04:24)  Prealbumin, Serum: 14 mg/dL (12-24-19 @ 02:35)    Triglycerides, Serum: 56 mg/dL (01.01.20 @ 00:42)  Triglycerides, Serum: 39 mg/dL (12.31.19 @ 00:56)  Triglycerides, Serum: 51 mg/dL (12.30.19 @ 01:08)  Triglycerides, Serum: 75 mg/dL (12.25.19 @ 02:29)    < from: Xray Chest 1 View- PORTABLE-Routine (01.02.20 @ 06:55) >  FINDINGS:  Right PICC line terminates in the SVC.  Small bilateral pleural effusions. No pneumothorax.  The cardiomediastinal silhouette is within normal limits.    IMPRESSION:   Small bilateral pleural effusions. No pneumothorax.    < end of copied text > [facility] NUTRITION SUPPORT / TPN -- FOLLOW UP NOTE  --------------------------------------------------------------------------------    24 hour events/subjective:    Pt had Lt chest Pigtail placed ->2240mL/24h now 500mL/ 24hr  Pt for FEES today  Ancef for incisional cellulitis -stopping today  Tolerating TPN & eating dysphagia diet - eating well when allowed to eat  SOB improved w/ NC O2  Pt denies any pain/ dyspnea/ cough/ palp  no n/v   Decreased Ileostomy output noted- 1,450mL for last 24h       Continue Octreotide, lomotil, Imodium, & tincture of opium        PPI to help decrease gastric secretions  Hypotension after pleural effusion improved w/ fluid repletion      Troponin elevation -  TTE done & noted  No f/c/s      Diet:  Diet, Dysphagia 1 Pureed-Nectar Consistency Fluid (12-25-19 @ 15:39)      Appetite: [  ]Poor [  x]Adequate [  ]Good  Caloric intake:  [ x  ]  Adequate   [   ] Inadequate    ROS: General/ GI see HPI  all other systems negative      ALLERGIES & MEDICATIONS  --------------------------------------------------------------------------------  ALLERGIES  IV Contrast (Hives)    STANDING INPATIENT MEDICATIONS    albuterol/ipratropium for Nebulization 3 milliLiter(s) Nebulizer every 6 hours  buDESOnide    Inhalation Suspension 0.5 milliGRAM(s) Inhalation every 12 hours  chlorhexidine 2% Cloths 1 Application(s) Topical <User Schedule>  diphenoxylate/atropine 2 Tablet(s) Oral <User Schedule>  enoxaparin Injectable 40 milliGRAM(s) SubCutaneous daily  fat emulsion (Fish Oil and Plant Based) 20% Infusion 20.8 mL/Hr IV Continuous <Continuous>  insulin lispro (HumaLOG) corrective regimen sliding scale   SubCutaneous three times a day before meals  insulin lispro (HumaLOG) corrective regimen sliding scale   SubCutaneous at bedtime  loperamide 16 milliGRAM(s) Oral <User Schedule>  octreotide  Injectable 100 MICROGram(s) SubCutaneous three times a day  opium Tincture 6 milliGRAM(s) Oral <User Schedule>  pantoprazole  Injectable 40 milliGRAM(s) IV Push daily  Parenteral Nutrition - Adult 1 Each TPN Continuous <Continuous>  Parenteral Nutrition - Adult 1 Each TPN Continuous <Continuous>  sodium chloride 0.9%. 1000 milliLiter(s) IV Continuous <Continuous>      PRN INPATIENT MEDICATION  ondansetron Injectable 4 milliGRAM(s) IV Push every 6 hours PRN        VITALS/PHYSICAL EXAM  --------------------------------------------------------------------------------  T(C): 37.4 (01-02-20 @ 11:00), Max: 37.4 (01-01-20 @ 19:00)  HR: 89 (01-02-20 @ 14:00) (78 - 99)  BP: 89/51 (01-02-20 @ 14:00) (86/52 - 136/63)  RR: 29 (01-02-20 @ 14:00) (18 - 42)  SpO2: 95% (01-02-20 @ 14:00) (91% - 100%)  Wt(kg): --        01-01-20 @ 07:01  -  01-02-20 @ 07:00  --------------------------------------------------------  IN: 4430.4 mL / OUT: 3400 mL / NET: 1030.4 mL    01-02-20 @ 07:01  -  01-02-20 @ 15:03  --------------------------------------------------------  IN: 2505 mL / OUT: 2200 mL / NET: 305 mL    PHYSICAL EXAM  --------------------------------------------------------------------------------  	Gen: guarded but stable, A&Ox3, NC O2  	HEENT: NC/AT, PERRL, supple neck, clear oropharynx, dried ruptured blisters  	GI: (+) BS, softly distended, non tender                   midline incision w/staples w/ scant serosanguinous dc from small area of skin dehiscence                        pale pink, blanchable w/o tenderness, fluctuance,  nor odor                   (+)ostomy pink viable- thick bilious liquid stool like material              MSK: FROM, no contractures nor deformities  	Vascular: Equally Warm, no clubbing, cyanosis, nor edema                        Rt PICC dressing c/d/I   	Neuro: No focal deficits, intact sensation, weakened strength  	Psych: Normal affect and mood  	Skin: Warm, without rashes, good turgor        LABS/ CULTURES/ RADIOLOGY:              8.1    15.89 >-----------<  310      [01-02-20 @ 01:19]              25.6     138  |  103  |  16  ----------------------------<  150      [01-02-20 @ 01:19]  3.9   |  26  |  0.44        Ca     7.2     [01-02-20 @ 01:19]      Mg     1.8     [01-02-20 @ 01:19]      Phos  2.8     [01-02-20 @ 01:19]    TPro  4.9  /  Alb  2.1  /  TBili  0.4  /  DBili  0.2  /  AST  10  /  ALT  11  /  AlkPhos  73  [01-02-20 @ 01:19]        CAPILLARY BLOOD GLUCOSE  POCT Blood Glucose.: 115 mg/dL (02 Jan 2020 10:58)  POCT Blood Glucose.: 119 mg/dL (02 Jan 2020 08:04)  POCT Blood Glucose.: 125 mg/dL (01 Jan 2020 22:16)  POCT Blood Glucose.: 103 mg/dL (01 Jan 2020 16:21)    Prealbumin, Serum: 16 mg/dL (01-01-20 @ 02:48)  Prealbumin, Serum: 15 mg/dL (12-31-19 @ 03:11)  Prealbumin, Serum: 16 mg/dL (12-30-19 @ 07:53)  Prealbumin, Serum: 13 mg/dL (12-25-19 @ 04:24)  Prealbumin, Serum: 14 mg/dL (12-24-19 @ 02:35)    Triglycerides, Serum: 56 mg/dL (01.01.20 @ 00:42)  Triglycerides, Serum: 39 mg/dL (12.31.19 @ 00:56)  Triglycerides, Serum: 51 mg/dL (12.30.19 @ 01:08)  Triglycerides, Serum: 75 mg/dL (12.25.19 @ 02:29)    < from: Xray Chest 1 View- PORTABLE-Routine (01.02.20 @ 06:55) >  FINDINGS:  Right PICC line terminates in the SVC.  Small bilateral pleural effusions. No pneumothorax.  The cardiomediastinal silhouette is within normal limits.    IMPRESSION:   Small bilateral pleural effusions. No pneumothorax.    < end of copied text >

## 2020-01-02 NOTE — SWALLOW FEES ASSESSMENT ADULT - RESIDUE IN LARYNGEAL VESTIBULE/VENTRICAL
trace residue on the superior laryngeal surface of the epiglottis trace residue on the superior laryngeal surface of the epiglottis/Trace Trace/along superior laryngeal surface of epiglottis Trace/along superior laryngeal surface of epiglottis; along laryngeal surface of left aryepiglottic fold after liquid wash post chewables along superior laryngeal surface of epiglottis/Trace

## 2020-01-02 NOTE — PROGRESS NOTE ADULT - ASSESSMENT
73 y/o M presenting with septic shock and high grade SBO s/p exploratory laparotomy, lysis of adhesions, decompression of bowel via enterotomy w/ primary repair, and Abthera VAC placement on 12/6; s/p take down of Abthera, washout and re-application of the Abthera vac on 12/8. Now s/p SBR and end ileostomy on 12/10 acute respiratory distress now improving, Pneumothorax, hyperglycemia, delirium.    PLAN:    Neuro: A&Ox3   - Tylenol if needed for pain    Resp: acute respiratory failure upon initial presentation, COPD at baseline, spontaneous left sided pneumothorax s/p pig-tail catheter placement.   - Continue to monitor on RA  - Continue Duonebs, budesonide  - Continue left sided pig-tail to suction     CV: septic shock requiring vasopressor support, now with intermittent hypotension   - Continuing to hold metoprolol in setting of hypotension     GI: SBO s/p exploratory laparotomy, lysis of adhesions, decompression of bowel via enterotomy w/ primary repair, and Abthera VAC placement and removal with 150 cm of small bowel removed and end ileostomy.   - dysphadia diet with TPN (calorie count)  - Monitor ostomy output, lomotil 2 tabs q6hrs & Imodium 16 mg q6hrs & tincture of opium 6 mg q6hrs, octreotide 100mcg SQ TID  - Continue 1:1 repletions with ostomy   - plan for FEES today     Renal: CHI, resolved   - Replete electrolytes as needed  - Monitor I/Os    Heme: no acute issues  - Lovenox for VTE prophylaxis  - SCDs     ID: No active issues   - Trend WBC, monitor fever curve    Endo: no acute issues  - SSI, q6 fingersticks    Dispo: SICU full code

## 2020-01-02 NOTE — SWALLOW FEES ASSESSMENT ADULT - ORAL PHASE
Grossly functional grossly functional grossly functional bolus reception prolonged inefficient mastication

## 2020-01-02 NOTE — PROGRESS NOTE ADULT - SUBJECTIVE AND OBJECTIVE BOX
VITAL SIGNS    Telemetry:      Vital Signs Last 24 Hrs  T(C): 36.4 (20 @ 07:00), Max: 37.4 (20 @ 19:00)  T(F): 97.5 (20 @ 07:00), Max: 99.3 (20 @ 19:00)  HR: 80 (20 @ 09:00) (77 - 121)  BP: 100/57 (20 @ 09:00) (86/52 - 136/63)  RR: 19 (20 @ 09:00) (18 - 42)  SpO2: 100% (20 @ 09:00) (91% - 100%)                   Daily     Daily Weight in k.7 (2020 01:00)      Bilirubin Direct, Serum: 0.2 mg/dL ( @ 01:19)  Bilirubin Total, Serum: 0.4 mg/dL ( @ 01:19)    CAPILLARY BLOOD GLUCOSE      POCT Blood Glucose.: 119 mg/dL (2020 08:04)  POCT Blood Glucose.: 125 mg/dL (2020 22:16)  POCT Blood Glucose.: 103 mg/dL (2020 16:21)  POCT Blood Glucose.: 130 mg/dL (2020 11:55)        Lt side IR placed pigtail for Lt pl eff                    PHYSICAL EXAM    Neurology: alert and oriented x 3, moves all extremities with no defecits  CV :  RRR    Lungs:  Abdomen: soft, nontender, nondistended, positive bowel sounds, last bowel movement   Extremities: VITAL SIGNS      Vital Signs Last 24 Hrs  T(C): 36.4 (20 @ 07:00), Max: 37.4 (20 @ 19:00)  T(F): 97.5 (20 @ 07:00), Max: 99.3 (20 @ 19:00)  HR: 80 (20 @ 09:00) (77 - 121)  BP: 100/57 (20 @ 09:00) (86/52 - 136/63)  RR: 19 (20 @ 09:00) (18 - 42)  SpO2: 100% (20 @ 09:00) (91% - 100%)                   Daily     Daily Weight in k.7 (2020 01:00)      Bilirubin Direct, Serum: 0.2 mg/dL ( @ 01:19)  Bilirubin Total, Serum: 0.4 mg/dL ( @ 01:19)    CAPILLARY BLOOD GLUCOSE      POCT Blood Glucose.: 119 mg/dL (2020 08:04)  POCT Blood Glucose.: 125 mg/dL (2020 22:16)  POCT Blood Glucose.: 103 mg/dL (2020 16:21)  POCT Blood Glucose.: 130 mg/dL (2020 11:55)        Lt side IR placed pigtail for Lt pl eff                    PHYSICAL EXAM    Neurology: alert and oriented x 3, moves all extremities with no defecits  CV :  RRR    Lungs:lt side diminshed throughout  Abdomen: soft, nontender, nondistended, positive bowel sounds, last bowel movement   Extremities:     no edema

## 2020-01-02 NOTE — PROGRESS NOTE ADULT - ASSESSMENT
A/P: 74 year old male w/ PMH of COPD and EtOH dependence with PSH/o appy, prostate surgery, & spine surgery  septic shock and high grade SBO s/p exploratory laparotomy, lysis of adhesions, decompression of bowel via enterotomy w/ primary repair, and Abthera VAC placement on 12/6; s/p take down of Abthera, washout and re-application of the Abthera vac on 12/8. Now s/p SBR and end ileostomy on 12/10.   TPN consulted to assist w/ management of pt's nutrition in pt w/ prolonged hospital course now tolerating diet but has high Ileostomy output     Continue TPN at full strength in pt w/ severe Protein-Calorie Malnutrition      - improving high ostomy output      - Tolerating Dysphagia 1 Pureed-Nectar Consistency Fluid       - Plan to continue TPN as pt alternating from eating /NPO status for testing                inconsistent measure of ileostomy output mngt  Pt tolerating TPN at GOAL:  120 grams amino acids (800ml 15% a.a.)  210 grams dextrose (300ml 70% dex)  50 grams SMOFlipid (250ml 20%IVFE @ 20.8ml/hr x 12 hours)  in 1800mL volume  Micronutrients: 10ml MVI, 3ml MTE-5    HypoNa  improving & will continue to monitor-maintaining K levels will help improve Na  Elevated K -improving, will keep KCl of 40 mEq KCl in TPN          possibly due to Octreotide  HypoPhos resolving, continue NaPhos of 45mMol   Strict Intake and Output-       high ileostomy output - greatly improved, continue to monitor as pt eating more consistently        pt now on increased lomotil, imodium, tincture of opium, & Octreotide   Elevated troponins- TTE noted,  HypoTn that improved w/ IVF after high drainage from Lt chest effusion  Consider sending ostomy output for electrolyte evaluation or fecal fat testing to see what pt is absorbing        to assess ongoing needs in pt with concerns for Short Gut Syndrome  Hyperglycemia - improving - Insulin removed from TPN      Fingersticks & ISS coverage - to be ordered by primary team  Leukocytosis- Ancef for improving cellulitis of incision   IR placed pigtail Lt chest effusion- still high drainage, continue to monitor as per SICU/ IR/ CTS  PICC w/dedicated port for only TPN - maintenance as per protocol  Weights three times a week  Monitor BMP, Mg, Ionized Ca, Phosphorus daily  Continue to monitor Triglycerides and Pre-albumin weekly  Continue as per SICU / Surgery, will follow with you, D/w primary team    Andreina Hubbard PA-C  TPN team, pager [phone]  D/w Ana M Siegel

## 2020-01-02 NOTE — SWALLOW FEES ASSESSMENT ADULT - RECOMMENDED FEEDING/EATING TECHNIQUES
maintain upright posture during/after eating for 30 mins/alternate food with liquid/crush medication (when feasible)/small sips/bites/oral hygiene/position upright (90 degrees)

## 2020-01-02 NOTE — PROGRESS NOTE ADULT - SUBJECTIVE AND OBJECTIVE BOX
SICU DAILY PROGRESS NOTE    74y Male presented with abdominal pain, nausea, hematemesis, and poor PO intake for ~2-3 days. Labs significant for an CHI w/ Cr 2.74 and lactate of 9.4. He was also notably tachycardic to the 110s and hypotensive w/ SBP in the 70s. He was given 2 units of PRBCs and 2 L of LR in the ED due to concern for upper GI bleeding. Imaging revealed high grade SBO in the RLQ. Patient was taken to the OR emergently for an exploratory laparotomy, lysis of adhesions, decompression of bowel via enterotomy w/ primary repair, and Abthera VAC placement. Of note, the distal 50% of the bowel appeared dusky but viable. He required vasopressor support with phenylephrine and vasopressin infusions. He received 3000 mL of crystalloid w/ EBL of 10 mL and UOP of 25 mL. Patient was left intubated at the end of the case so SICU was consulted for hemodynamic monitoring. Taken back to the OR on 12/8 and underwent take down of Abthera, washout and re-application of the Abthera vac. Went back to OR on 12/10 night for colectomy. Post-op course complicated by short gut syndrome      24 HOUR EVENTS:  - had one episode of hypotension when ooB to chair, self resolved once patient back in bed  - repletions 1cc:1cc switched to Q4     SUBJECTIVE/ROS:  [ x] A ten-point review of systems was otherwise negative except as noted.  [ ] Due to altered mental status/intubation, subjective information were not able to be obtained from the patient. History was obtained, to the extent possible, from review of the chart and collateral sources of information.      NEURO  Exam: awake, alert, oriented  Meds: ondansetron Injectable 4 milliGRAM(s) IV Push every 6 hours PRN Nausea and/or Vomiting  opium Tincture 6 milliGRAM(s) Oral <User Schedule>    [x] Adequacy of sedation and pain control has been assessed and adjusted      RESPIRATORY  RR: 30 (01-02-20 @ 04:00) (18 - 42)  SpO2: 91% (01-02-20 @ 04:00) (91% - 100%)  Wt(kg): --  Exam: unlabored, clear to auscultation bilaterally  Mechanical Ventilation:     [N/A] Extubation Readiness Assessed  Meds: albuterol/ipratropium for Nebulization 3 milliLiter(s) Nebulizer every 6 hours  buDESOnide    Inhalation Suspension 0.5 milliGRAM(s) Inhalation every 12 hours        CARDIOVASCULAR  HR: 92 (01-02-20 @ 04:00) (77 - 121)  BP: 123/65 (01-02-20 @ 04:00) (86/52 - 163/84)  BP(mean): 86 (01-02-20 @ 04:00) (65 - 115)  ABP: --  ABP(mean): --  Wt(kg): --  CVP(cm H2O): --      Exam: regular rate and rhythm  Cardiac Rhythm: sinus  Perfusion     [x]Adequate   [ ]Inadequate  Mentation   [x]Normal       [ ]Reduced  Extremities  [x]Warm         [ ]Cool  Volume Status [ ]Hypervolemic [x]Euvolemic [ ]Hypovolemic  Meds:       GI/NUTRITION  Exam: soft, nontender, nondistended, incision erythema stable, ostomy pink/viable   Diet: dysphagia diet   Meds: diphenoxylate/atropine 2 Tablet(s) Oral <User Schedule>  loperamide 16 milliGRAM(s) Oral <User Schedule>  pantoprazole  Injectable 40 milliGRAM(s) IV Push daily      GENITOURINARY  I&O's Detail    12-31 @ 07:01 - 01-01 @ 07:00  --------------------------------------------------------  IN:    fat emulsion (Fish Oil and Plant Based) 20% Infusion: 670.4 mL    Oral Fluid: 120 mL    sodium chloride 0.9%: 700 mL    sodium chloride 0.9%: 1010 mL    Solution: 50 mL    Solution: 50 mL    Solution: 50 mL    TPN (Total Parenteral Nutrition): 1800 mL  Total IN: 4450.4 mL    OUT:    Chest Tube: 400 mL    Ileostomy: 2500 mL    Voided: 1280 mL  Total OUT: 4180 mL    Total NET: 270.4 mL      01-01 @ 07:01  -  01-02 @ 04:28  --------------------------------------------------------  IN:    fat emulsion (Fish Oil and Plant Based) 20% Infusion: 249.6 mL    Oral Fluid: 200 mL    sodium chloride 0.9%: 40 mL    sodium chloride 0.9%: 710 mL    Solution: 312.5 mL    Solution: 700 mL    TPN (Total Parenteral Nutrition): 1575 mL  Total IN: 3787.1 mL    OUT:    Chest Tube: 300 mL    Ileostomy: 1300 mL    Voided: 1100 mL  Total OUT: 2700 mL    Total NET: 1087.1 mL          01-02    138  |  103  |  16  ----------------------------<  150<H>  3.9   |  26  |  0.44<L>    Ca    7.2<L>      02 Jan 2020 01:19  Phos  2.8     01-02  Mg     1.8     01-02    TPro  4.9<L>  /  Alb  2.1<L>  /  TBili  0.4  /  DBili  0.2  /  AST  10  /  ALT  11  /  AlkPhos  73  01-02    [ ] Martinez catheter, indication: N/A  Meds: fat emulsion (Fish Oil and Plant Based) 20% Infusion 20.8 mL/Hr IV Continuous <Continuous>  Parenteral Nutrition - Adult 1 Each TPN Continuous <Continuous>  potassium chloride    Tablet ER 20 milliEquivalent(s) Oral once  sodium chloride 0.9%. 1000 milliLiter(s) IV Continuous <Continuous>        HEMATOLOGIC  Meds: enoxaparin Injectable 40 milliGRAM(s) SubCutaneous daily    [x] VTE Prophylaxis                        8.1    15.89 )-----------( 310      ( 02 Jan 2020 01:19 )             25.6       Transfusion     [ ] PRBC   [ ] Platelets   [ ] FFP   [ ] Cryoprecipitate      INFECTIOUS DISEASES  WBC Count: 15.89 K/uL (01-02 @ 01:19)    RECENT CULTURES:  Specimen Source: .Blood Blood  Date/Time: 12-30 @ 22:09  Culture Results:   No growth to date.  Gram Stain: --  Organism: --  Specimen Source: .Body Fluid Pleural Fluid  Date/Time: 12-30 @ 19:16  Culture Results:   No growth  Gram Stain:   No polymorphonuclear cells seen  No organisms seen  by cytocentrifuge  Organism: --    Meds: acyclovir   Oral Tab/Cap 400 milliGRAM(s) Oral three times a day  ceFAZolin   IVPB      ceFAZolin   IVPB 1000 milliGRAM(s) IV Intermittent every 8 hours        ENDOCRINE  CAPILLARY BLOOD GLUCOSE      POCT Blood Glucose.: 125 mg/dL (01 Jan 2020 22:16)  POCT Blood Glucose.: 103 mg/dL (01 Jan 2020 16:21)  POCT Blood Glucose.: 130 mg/dL (01 Jan 2020 11:55)  POCT Blood Glucose.: 142 mg/dL (01 Jan 2020 08:08)    Meds: insulin lispro (HumaLOG) corrective regimen sliding scale   SubCutaneous three times a day before meals  insulin lispro (HumaLOG) corrective regimen sliding scale   SubCutaneous at bedtime  octreotide  Injectable 100 MICROGram(s) SubCutaneous three times a day        ACCESS DEVICES:  [ ] Peripheral IV  [ ] Central Venous Line	[ ] R	[ ] L	[ ] IJ	[ ] Fem	[ ] SC	Placed:   [ ] Arterial Line		[ ] R	[ ] L	[ ] Fem	[ ] Rad	[ ] Ax	Placed:   [ ] PICC:					[ ] Mediport  [ ] Urinary Catheter, Date Placed:   [x] Necessity of urinary, arterial, and venous catheters discussed    OTHER MEDICATIONS:  chlorhexidine 2% Cloths 1 Application(s) Topical <User Schedule>      CODE STATUS:    full code

## 2020-01-03 LAB
ALBUMIN SERPL ELPH-MCNC: 2 G/DL — LOW (ref 3.3–5)
ALP SERPL-CCNC: 69 U/L — SIGNIFICANT CHANGE UP (ref 40–120)
ALT FLD-CCNC: 11 U/L — SIGNIFICANT CHANGE UP (ref 10–45)
ANION GAP SERPL CALC-SCNC: 8 MMOL/L — SIGNIFICANT CHANGE UP (ref 5–17)
AST SERPL-CCNC: 11 U/L — SIGNIFICANT CHANGE UP (ref 10–40)
BILIRUB DIRECT SERPL-MCNC: 0.2 MG/DL — SIGNIFICANT CHANGE UP (ref 0–0.2)
BILIRUB INDIRECT FLD-MCNC: 0.2 MG/DL — SIGNIFICANT CHANGE UP (ref 0.2–1)
BILIRUB SERPL-MCNC: 0.4 MG/DL — SIGNIFICANT CHANGE UP (ref 0.2–1.2)
BUN SERPL-MCNC: 16 MG/DL — SIGNIFICANT CHANGE UP (ref 7–23)
CALCIUM SERPL-MCNC: 7.1 MG/DL — LOW (ref 8.4–10.5)
CHLORIDE SERPL-SCNC: 104 MMOL/L — SIGNIFICANT CHANGE UP (ref 96–108)
CO2 SERPL-SCNC: 26 MMOL/L — SIGNIFICANT CHANGE UP (ref 22–31)
CREAT SERPL-MCNC: 0.45 MG/DL — LOW (ref 0.5–1.3)
GLUCOSE BLDC GLUCOMTR-MCNC: 117 MG/DL — HIGH (ref 70–99)
GLUCOSE BLDC GLUCOMTR-MCNC: 120 MG/DL — HIGH (ref 70–99)
GLUCOSE BLDC GLUCOMTR-MCNC: 130 MG/DL — HIGH (ref 70–99)
GLUCOSE BLDC GLUCOMTR-MCNC: 134 MG/DL — HIGH (ref 70–99)
GLUCOSE SERPL-MCNC: 139 MG/DL — HIGH (ref 70–99)
HCT VFR BLD CALC: 25.5 % — LOW (ref 39–50)
HGB BLD-MCNC: 7.7 G/DL — LOW (ref 13–17)
MAGNESIUM SERPL-MCNC: 1.9 MG/DL — SIGNIFICANT CHANGE UP (ref 1.6–2.6)
MCHC RBC-ENTMCNC: 29.5 PG — SIGNIFICANT CHANGE UP (ref 27–34)
MCHC RBC-ENTMCNC: 30.2 GM/DL — LOW (ref 32–36)
MCV RBC AUTO: 97.7 FL — SIGNIFICANT CHANGE UP (ref 80–100)
NRBC # BLD: 0 /100 WBCS — SIGNIFICANT CHANGE UP (ref 0–0)
PHOSPHATE SERPL-MCNC: 2.7 MG/DL — SIGNIFICANT CHANGE UP (ref 2.5–4.5)
PLATELET # BLD AUTO: 283 K/UL — SIGNIFICANT CHANGE UP (ref 150–400)
POTASSIUM SERPL-MCNC: 4.1 MMOL/L — SIGNIFICANT CHANGE UP (ref 3.5–5.3)
POTASSIUM SERPL-SCNC: 4.1 MMOL/L — SIGNIFICANT CHANGE UP (ref 3.5–5.3)
PROT SERPL-MCNC: 4.9 G/DL — LOW (ref 6–8.3)
RBC # BLD: 2.61 M/UL — LOW (ref 4.2–5.8)
RBC # FLD: 14.9 % — HIGH (ref 10.3–14.5)
SODIUM SERPL-SCNC: 138 MMOL/L — SIGNIFICANT CHANGE UP (ref 135–145)
WBC # BLD: 14.5 K/UL — HIGH (ref 3.8–10.5)
WBC # FLD AUTO: 14.5 K/UL — HIGH (ref 3.8–10.5)

## 2020-01-03 PROCEDURE — 99231 SBSQ HOSP IP/OBS SF/LOW 25: CPT

## 2020-01-03 PROCEDURE — 99232 SBSQ HOSP IP/OBS MODERATE 35: CPT

## 2020-01-03 PROCEDURE — 71045 X-RAY EXAM CHEST 1 VIEW: CPT | Mod: 26

## 2020-01-03 RX ORDER — ELECTROLYTE SOLUTION,INJ
1 VIAL (ML) INTRAVENOUS
Refills: 0 | Status: DISCONTINUED | OUTPATIENT
Start: 2020-01-03 | End: 2020-01-03

## 2020-01-03 RX ORDER — I.V. FAT EMULSION 20 G/100ML
20.8 EMULSION INTRAVENOUS
Qty: 50 | Refills: 0 | Status: DISCONTINUED | OUTPATIENT
Start: 2020-01-03 | End: 2020-01-04

## 2020-01-03 RX ORDER — MAGNESIUM SULFATE 500 MG/ML
2 VIAL (ML) INJECTION ONCE
Refills: 0 | Status: COMPLETED | OUTPATIENT
Start: 2020-01-03 | End: 2020-01-03

## 2020-01-03 RX ADMIN — Medication 1 EACH: at 07:34

## 2020-01-03 RX ADMIN — Medication 0.5 MILLIGRAM(S): at 05:19

## 2020-01-03 RX ADMIN — SODIUM CHLORIDE 10 MILLILITER(S): 9 INJECTION INTRAMUSCULAR; INTRAVENOUS; SUBCUTANEOUS at 06:03

## 2020-01-03 RX ADMIN — I.V. FAT EMULSION 20.8 ML/HR: 20 EMULSION INTRAVENOUS at 17:15

## 2020-01-03 RX ADMIN — Medication 2 TABLET(S): at 07:35

## 2020-01-03 RX ADMIN — Medication 2 TABLET(S): at 17:32

## 2020-01-03 RX ADMIN — Medication 3 MILLILITER(S): at 18:12

## 2020-01-03 RX ADMIN — Medication 3 MILLILITER(S): at 05:19

## 2020-01-03 RX ADMIN — MORPHINE 6 MILLIGRAM(S): 10 SOLUTION ORAL at 12:40

## 2020-01-03 RX ADMIN — CHLORHEXIDINE GLUCONATE 1 APPLICATION(S): 213 SOLUTION TOPICAL at 06:03

## 2020-01-03 RX ADMIN — Medication 16 MILLIGRAM(S): at 12:40

## 2020-01-03 RX ADMIN — Medication 250 MILLIMOLE(S): at 03:14

## 2020-01-03 RX ADMIN — MORPHINE 6 MILLIGRAM(S): 10 SOLUTION ORAL at 07:35

## 2020-01-03 RX ADMIN — OCTREOTIDE ACETATE 100 MICROGRAM(S): 200 INJECTION, SOLUTION INTRAVENOUS; SUBCUTANEOUS at 06:03

## 2020-01-03 RX ADMIN — PANTOPRAZOLE SODIUM 40 MILLIGRAM(S): 20 TABLET, DELAYED RELEASE ORAL at 11:03

## 2020-01-03 RX ADMIN — Medication 50 GRAM(S): at 04:21

## 2020-01-03 RX ADMIN — MORPHINE 6 MILLIGRAM(S): 10 SOLUTION ORAL at 22:38

## 2020-01-03 RX ADMIN — Medication 3 MILLILITER(S): at 00:18

## 2020-01-03 RX ADMIN — MORPHINE 6 MILLIGRAM(S): 10 SOLUTION ORAL at 17:32

## 2020-01-03 RX ADMIN — SODIUM CHLORIDE 10 MILLILITER(S): 9 INJECTION INTRAMUSCULAR; INTRAVENOUS; SUBCUTANEOUS at 17:15

## 2020-01-03 RX ADMIN — Medication 16 MILLIGRAM(S): at 17:16

## 2020-01-03 RX ADMIN — Medication 1 EACH: at 17:15

## 2020-01-03 RX ADMIN — Medication 2 TABLET(S): at 12:40

## 2020-01-03 RX ADMIN — Medication 0.5 MILLIGRAM(S): at 18:12

## 2020-01-03 RX ADMIN — SODIUM CHLORIDE 10 MILLILITER(S): 9 INJECTION INTRAMUSCULAR; INTRAVENOUS; SUBCUTANEOUS at 07:35

## 2020-01-03 RX ADMIN — ENOXAPARIN SODIUM 40 MILLIGRAM(S): 100 INJECTION SUBCUTANEOUS at 11:03

## 2020-01-03 RX ADMIN — OCTREOTIDE ACETATE 100 MICROGRAM(S): 200 INJECTION, SOLUTION INTRAVENOUS; SUBCUTANEOUS at 15:18

## 2020-01-03 RX ADMIN — Medication 2 TABLET(S): at 22:38

## 2020-01-03 RX ADMIN — Medication 16 MILLIGRAM(S): at 22:40

## 2020-01-03 RX ADMIN — OCTREOTIDE ACETATE 100 MICROGRAM(S): 200 INJECTION, SOLUTION INTRAVENOUS; SUBCUTANEOUS at 22:40

## 2020-01-03 RX ADMIN — Medication 16 MILLIGRAM(S): at 07:35

## 2020-01-03 NOTE — PROGRESS NOTE ADULT - SUBJECTIVE AND OBJECTIVE BOX
Surgery Progress Note  Patient is a 74y old  Male who presents with a chief complaint of abd. pain (03 Jan 2020 03:13)      SUBJECTIVE: Patient seen and examined at bedside with surgical team.   States breathing is back to baseline. Tolerating diet.  Continues to have high ostomy op 2300/24hr     24Hr Events Per SICU   - Underwent FEES, will remain on dysphagia 1 diet  - Ostomy output thickening up    Vital Signs Last 24 Hrs  T(C): 36.9 (03 Jan 2020 07:00), Max: 37.4 (02 Jan 2020 11:00)  T(F): 98.4 (03 Jan 2020 07:00), Max: 99.3 (02 Jan 2020 11:00)  HR: 82 (03 Jan 2020 07:00) (79 - 96)  BP: 102/57 (03 Jan 2020 07:00) (89/51 - 142/110)  BP(mean): 74 (03 Jan 2020 07:00) (65 - 123)  RR: 19 (03 Jan 2020 07:00) (14 - 44)  SpO2: 97% (03 Jan 2020 07:00) (91% - 100%)    Physical Exam  Constitutional: NAD  Respiratory: breathing comfortably on 2L NC. Left sided CT in place    Abd: soft, NT, ND, midline incision is well approximated with non-expanding surrounding erythema   Ext: moving all 4 ext spontaneously     I&O's Detail    02 Jan 2020 07:01  -  03 Jan 2020 07:00  --------------------------------------------------------  IN:    fat emulsion (Fish Oil and Plant Based) 20% Infusion: 249.6 mL    Oral Fluid: 320 mL    sodium chloride 0.9%.: 180 mL    Solution: 3500 mL    Solution: 350 mL    TPN (Total Parenteral Nutrition): 1725 mL  Total IN: 6324.6 mL    OUT:    Chest Tube: 800 mL    Ileostomy: 3500 mL    Voided: 1500 mL  Total OUT: 5800 mL    Total NET: 524.6 mL      MEDICATIONS  (STANDING):  albuterol/ipratropium for Nebulization 3 milliLiter(s) Nebulizer every 6 hours  buDESOnide    Inhalation Suspension 0.5 milliGRAM(s) Inhalation every 12 hours  chlorhexidine 2% Cloths 1 Application(s) Topical <User Schedule>  diphenoxylate/atropine 2 Tablet(s) Oral <User Schedule>  enoxaparin Injectable 40 milliGRAM(s) SubCutaneous daily  fat emulsion (Fish Oil and Plant Based) 20% Infusion 20.8 mL/Hr (20.8 mL/Hr) IV Continuous <Continuous>  insulin lispro (HumaLOG) corrective regimen sliding scale   SubCutaneous three times a day before meals  insulin lispro (HumaLOG) corrective regimen sliding scale   SubCutaneous at bedtime  loperamide 16 milliGRAM(s) Oral <User Schedule>  octreotide  Injectable 100 MICROGram(s) SubCutaneous three times a day  opium Tincture 6 milliGRAM(s) Oral <User Schedule>  pantoprazole  Injectable 40 milliGRAM(s) IV Push daily  Parenteral Nutrition - Adult 1 Each (75 mL/Hr) TPN Continuous <Continuous>  sodium chloride 0.9%. 1000 milliLiter(s) (10 mL/Hr) IV Continuous <Continuous>    MEDICATIONS  (PRN):  ondansetron Injectable 4 milliGRAM(s) IV Push every 6 hours PRN Nausea and/or Vomiting      LABS:                        7.7    14.50 )-----------( 283      ( 03 Jan 2020 00:40 )             25.5     01-03    138  |  104  |  16  ----------------------------<  139<H>  4.1   |  26  |  0.45<L>    Ca    7.1<L>      03 Jan 2020 00:43  Phos  2.7     01-03  Mg     1.9     01-03    TPro  4.9<L>  /  Alb  2.0<L>  /  TBili  0.4  /  DBili  0.2  /  AST  11  /  ALT  11  /  AlkPhos  69  01-03      LIVER FUNCTIONS - ( 03 Jan 2020 00:43 )  Alb: 2.0 g/dL / Pro: 4.9 g/dL / ALK PHOS: 69 U/L / ALT: 11 U/L / AST: 11 U/L / GGT: x

## 2020-01-03 NOTE — PROGRESS NOTE ADULT - SUBJECTIVE AND OBJECTIVE BOX
SICU DAILY PROGRESS NOTE    74y Male presented with abdominal pain, nausea, hematemesis, and poor PO intake for ~2-3 days. Labs significant for an CHI w/ Cr 2.74 and lactate of 9.4. He was also notably tachycardic to the 110s and hypotensive w/ SBP in the 70s. He was given 2 units of PRBCs and 2 L of LR in the ED due to concern for upper GI bleeding. Imaging revealed high grade SBO in the RLQ. Patient was taken to the OR emergently for an exploratory laparotomy, lysis of adhesions, decompression of bowel via enterotomy w/ primary repair, and Abthera VAC placement. Of note, the distal 50% of the bowel appeared dusky but viable. He required vasopressor support with phenylephrine and vasopressin infusions. He received 3000 mL of crystalloid w/ EBL of 10 mL and UOP of 25 mL. Patient was left intubated at the end of the case so SICU was consulted for hemodynamic monitoring. Taken back to the OR on 12/8 and underwent take down of Abthera, washout and re-application of the Abthera vac. Went back to OR on 12/10 night for colectomy. Post-op course complicated by short gut syndrome      24 HOUR EVENTS:  - Underwent FEES, will remain on dysphagia 1 diet  - Ostomy output thickening up    SUBJECTIVE/ROS:  [ ] A ten-point review of systems was otherwise negative except as noted.  [ ] Due to altered mental status/intubation, subjective information were not able to be obtained from the patient. History was obtained, to the extent possible, from review of the chart and collateral sources of information.      NEURO  RASS:     GCS:     CAM ICU:  Exam: awake, alert, oriented  Meds: ondansetron Injectable 4 milliGRAM(s) IV Push every 6 hours PRN Nausea and/or Vomiting  opium Tincture 6 milliGRAM(s) Oral <User Schedule>    [x] Adequacy of sedation and pain control has been assessed and adjusted      RESPIRATORY  RR: 14 (01-03-20 @ 03:00) (14 - 44)  SpO2: 100% (01-03-20 @ 03:00) (91% - 100%)  Wt(kg): --  Exam: unlabored, equal chest rise, chest pigtail in place  Mechanical Ventilation:     [N/A] Extubation Readiness Assessed  Meds: albuterol/ipratropium for Nebulization 3 milliLiter(s) Nebulizer every 6 hours  buDESOnide    Inhalation Suspension 0.5 milliGRAM(s) Inhalation every 12 hours        CARDIOVASCULAR  HR: 84 (01-03-20 @ 03:00) (8 - 94)  BP: 108/55 (01-03-20 @ 03:00) (89/51 - 142/110)  BP(mean): 75 (01-03-20 @ 03:00) (65 - 123)  ABP: --  ABP(mean): --  Wt(kg): --  CVP(cm H2O): --      Exam: regular rate and rhythm  Cardiac Rhythm: sinus  Perfusion     [x]Adequate   [ ]Inadequate  Mentation   [x]Normal       [ ]Reduced  Extremities  [x]Warm         [ ]Cool  Volume Status [ ]Hypervolemic [x]Euvolemic [ ]Hypovolemic  Meds:       GI/NUTRITION  Exam: soft, nontender, nondistended, incision w/ mild erythema otherwise dry/intact  Diet: dysphagia 1 w/ TPN  Meds: diphenoxylate/atropine 2 Tablet(s) Oral <User Schedule>  loperamide 16 milliGRAM(s) Oral <User Schedule>  pantoprazole  Injectable 40 milliGRAM(s) IV Push daily      GENITOURINARY  I&O's Detail    01-01 @ 07:01  -  01-02 @ 07:00  --------------------------------------------------------  IN:    fat emulsion (Fish Oil and Plant Based) 20% Infusion: 270.4 mL    Oral Fluid: 200 mL    sodium chloride 0.9%: 40 mL    sodium chloride 0.9%: 710 mL    sodium chloride 0.9%.: 10 mL    Solution: 50 mL    Solution: 850 mL    Solution: 500 mL    TPN (Total Parenteral Nutrition): 1800 mL  Total IN: 4430.4 mL    OUT:    Chest Tube: 500 mL    Ileostomy: 1450 mL    Voided: 1450 mL  Total OUT: 3400 mL    Total NET: 1030.4 mL      01-02 @ 07:01  -  01-03 @ 03:13  --------------------------------------------------------  IN:    fat emulsion (Fish Oil and Plant Based) 20% Infusion: 166.4 mL    Oral Fluid: 120 mL    sodium chloride 0.9%.: 130 mL    Solution: 50 mL    Solution: 2850 mL    TPN (Total Parenteral Nutrition): 1275 mL  Total IN: 4591.4 mL    OUT:    Chest Tube: 600 mL    Ileostomy: 3000 mL    Voided: 1050 mL  Total OUT: 4650 mL    Total NET: -58.6 mL          01-03    138  |  104  |  16  ----------------------------<  139<H>  4.1   |  26  |  0.45<L>    Ca    7.1<L>      03 Jan 2020 00:43  Phos  2.7     01-03  Mg     1.9     01-03    TPro  4.9<L>  /  Alb  2.0<L>  /  TBili  0.4  /  DBili  0.2  /  AST  11  /  ALT  11  /  AlkPhos  69  01-03    [ ] Martinez catheter, indication: N/A  Meds: fat emulsion (Fish Oil and Plant Based) 20% Infusion 20.8 mL/Hr IV Continuous <Continuous>  magnesium sulfate  IVPB 2 Gram(s) IV Intermittent once  Parenteral Nutrition - Adult 1 Each TPN Continuous <Continuous>  sodium chloride 0.9%. 1000 milliLiter(s) IV Continuous <Continuous>  sodium phosphate IVPB 15 milliMole(s) IV Intermittent once        HEMATOLOGIC  Meds: enoxaparin Injectable 40 milliGRAM(s) SubCutaneous daily    [x] VTE Prophylaxis                        7.7    14.50 )-----------( 283      ( 03 Jan 2020 00:40 )             25.5       Transfusion     [ ] PRBC   [ ] Platelets   [ ] FFP   [ ] Cryoprecipitate      INFECTIOUS DISEASES  WBC Count: 14.50 K/uL (01-03 @ 00:40)    RECENT CULTURES:  Specimen Source: .Blood Blood  Date/Time: 12-30 @ 22:09  Culture Results:   No growth to date.  Gram Stain: --  Organism: --  Specimen Source: .Body Fluid Pleural Fluid  Date/Time: 12-30 @ 19:16  Culture Results:   No growth  Gram Stain:   No polymorphonuclear cells seen  No organisms seen  by cytocentrifuge  Organism: --    Meds:       ENDOCRINE  CAPILLARY BLOOD GLUCOSE      POCT Blood Glucose.: 122 mg/dL (02 Jan 2020 22:04)  POCT Blood Glucose.: 131 mg/dL (02 Jan 2020 16:03)  POCT Blood Glucose.: 115 mg/dL (02 Jan 2020 10:58)  POCT Blood Glucose.: 119 mg/dL (02 Jan 2020 08:04)    Meds: insulin lispro (HumaLOG) corrective regimen sliding scale   SubCutaneous three times a day before meals  insulin lispro (HumaLOG) corrective regimen sliding scale   SubCutaneous at bedtime  octreotide  Injectable 100 MICROGram(s) SubCutaneous three times a day        ACCESS DEVICES:  [ ] Peripheral IV  [ ] Central Venous Line	[ ] R	[ ] L	[ ] IJ	[ ] Fem	[ ] SC	Placed:   [ ] Arterial Line		[ ] R	[ ] L	[ ] Fem	[ ] Rad	[ ] Ax	Placed:   [ ] PICC:					[ ] Mediport  [ ] Urinary Catheter, Date Placed:   [x] Necessity of urinary, arterial, and venous catheters discussed    OTHER MEDICATIONS:  chlorhexidine 2% Cloths 1 Application(s) Topical <User Schedule>      CODE STATUS:      IMAGING:

## 2020-01-03 NOTE — PROGRESS NOTE ADULT - SUBJECTIVE AND OBJECTIVE BOX
Patient is a 74y old  Male who presents with a chief complaint of abd. pain (2020 09:26)      SUBJECTIVE: "I feel okay this morning, I am breathing better "    Vital Signs Last 24 Hrs  T(C): 36.9 (20 @ 07:00), Max: 37.2 (20 @ 15:00)  T(F): 98.4 (20 @ 07:00), Max: 99 (20 @ 15:00)  HR: 94 (20 @ 10:00) (79 - 96)  BP: 121/74 (20 @ 10:00) (89/51 - 142/110)  RR: 28 (20 @ 10:00) (14 - 44)  SpO2: 98% (20 @ 10:00) (91% - 100%)                20 @ 07:01  -  20 @ 07:00  --------------------------------------------------------  IN: 6384.6 mL / OUT: 5800 mL / NET: 584.6 mL    20 @ 07:01  -  20 @ 11:01  --------------------------------------------------------  IN: 665 mL / OUT: 550 mL / NET: 115 mL        Daily     Daily Weight in k.3 (2020 00:56)                          7.7    14.50 )-----------( 283      ( 2020 00:40 )             25.5         138  |  104  |  16  ----------------------------<  139<H>  4.1   |  26  |  0.45<L>    Ca    7.1<L>      2020 00:43  Phos  2.7       Mg     1.9     -    TPro  4.9<L>  /  Alb  2.0<L>  /  TBili  0.4  /  DBili  0.2  /  AST  11  /  ALT  11  /  AlkPhos  69            PHYSICAL EXAM  Neurology: A&Ox3, NAD, no gross deficits  CV : RRR+S1S2  Lungs: Respirations non-labored, B/L BS  Abdomen: Soft, NT/ND, +BSx4Q +ileostomy + TPN  Extremities: B/L LE edema, negative calf tenderness, +PP           CHEST TUBES:  Left CT     [ x ]LWS  [  ]H2O seal pigtail cath 600 ml output overnight, serous, 800 ml in 24 hours                 MEDICATIONS  albuterol/ipratropium for Nebulization 3 milliLiter(s) Nebulizer every 6 hours  buDESOnide    Inhalation Suspension 0.5 milliGRAM(s) Inhalation every 12 hours  chlorhexidine 2% Cloths 1 Application(s) Topical <User Schedule>  diphenoxylate/atropine 2 Tablet(s) Oral <User Schedule>  enoxaparin Injectable 40 milliGRAM(s) SubCutaneous daily  fat emulsion (Fish Oil and Plant Based) 20% Infusion 20.8 mL/Hr IV Continuous <Continuous>  insulin lispro (HumaLOG) corrective regimen sliding scale   SubCutaneous three times a day before meals  insulin lispro (HumaLOG) corrective regimen sliding scale   SubCutaneous at bedtime  loperamide 16 milliGRAM(s) Oral <User Schedule>  octreotide  Injectable 100 MICROGram(s) SubCutaneous three times a day  ondansetron Injectable 4 milliGRAM(s) IV Push every 6 hours PRN  opium Tincture 6 milliGRAM(s) Oral <User Schedule>  pantoprazole  Injectable 40 milliGRAM(s) IV Push daily  Parenteral Nutrition - Adult 1 Each TPN Continuous <Continuous>  sodium chloride 0.9%. 1000 milliLiter(s) IV Continuous <Continuous>

## 2020-01-03 NOTE — PROGRESS NOTE ADULT - SUBJECTIVE AND OBJECTIVE BOX
[facility] NUTRITION SUPPORT / TPN -- FOLLOW UP NOTE  --------------------------------------------------------------------------------    24 hour events/subjective:          Diet:  Diet, Dysphagia 1 Pureed-Nectar Consistency Fluid (12-25-19 @ 15:39)      Appetite: [  ]Poor [ x ]Adequate [  ]Good  Caloric intake:  [x   ]  Adequate   [   ] Inadequate    ROS: General/ GI see HPI  all other systems negative      ALLERGIES & MEDICATIONS  --------------------------------------------------------------------------------  ALLERGIES  IV Contrast (Hives)    STANDING INPATIENT MEDICATIONS    albuterol/ipratropium for Nebulization 3 milliLiter(s) Nebulizer every 6 hours  buDESOnide    Inhalation Suspension 0.5 milliGRAM(s) Inhalation every 12 hours  chlorhexidine 2% Cloths 1 Application(s) Topical <User Schedule>  diphenoxylate/atropine 2 Tablet(s) Oral <User Schedule>  enoxaparin Injectable 40 milliGRAM(s) SubCutaneous daily  fat emulsion (Fish Oil and Plant Based) 20% Infusion 20.8 mL/Hr IV Continuous <Continuous>  insulin lispro (HumaLOG) corrective regimen sliding scale   SubCutaneous three times a day before meals  insulin lispro (HumaLOG) corrective regimen sliding scale   SubCutaneous at bedtime  loperamide 16 milliGRAM(s) Oral <User Schedule>  octreotide  Injectable 100 MICROGram(s) SubCutaneous three times a day  opium Tincture 6 milliGRAM(s) Oral <User Schedule>  pantoprazole  Injectable 40 milliGRAM(s) IV Push daily  Parenteral Nutrition - Adult 1 Each TPN Continuous <Continuous>  sodium chloride 0.9%. 1000 milliLiter(s) IV Continuous <Continuous>      PRN INPATIENT MEDICATION  ondansetron Injectable 4 milliGRAM(s) IV Push every 6 hours PRN        VITALS/PHYSICAL EXAM  --------------------------------------------------------------------------------  T(C): 36.9 (01-03-20 @ 07:00), Max: 37.4 (01-02-20 @ 11:00)  HR: 89 (01-03-20 @ 08:00) (79 - 96)  BP: 125/62 (01-03-20 @ 08:00) (89/51 - 142/110)  RR: 29 (01-03-20 @ 08:00) (14 - 44)  SpO2: 97% (01-03-20 @ 08:00) (91% - 100%)  Wt(kg): --        01-02-20 @ 07:01  -  01-03-20 @ 07:00  --------------------------------------------------------  IN: 6384.6 mL / OUT: 5800 mL / NET: 584.6 mL    01-03-20 @ 07:01  -  01-03-20 @ 09:26  --------------------------------------------------------  IN: 0 mL / OUT: 50 mL / NET: -50 mL    PHYSICAL EXAM  --------------------------------------------------------------------------------  	Gen: guarded but stable, A&Ox3, NC O2  	HEENT: NC/AT, PERRL, supple neck, clear oropharynx, dried ruptured blisters  	GI: (+) BS, softly distended, non tender                   midline incision w/staples w/ scant serosanguinous dc from small area of skin dehiscence                    pale pink, blanchable w/o tenderness, fluctuance,  nor odor                   (+)ostomy pink viable- thick bilious liquid stool like material              MSK: FROM, no contractures nor deformities  	Vascular: Equally Warm, no clubbing, cyanosis, nor edema                        Rt PICC dressing c/d/I   	Neuro: No focal deficits, intact sensation, weakened strength  	Psych: Normal affect and mood  	Skin: Warm, without rashes, good turgor        LABS/ CULTURES/ RADIOLOGY:              7.7    14.50 >-----------<  283      [01-03-20 @ 00:40]              25.5     138  |  104  |  16  ----------------------------<  139      [01-03-20 @ 00:43]  4.1   |  26  |  0.45        Ca     7.1     [01-03-20 @ 00:43]      Mg     1.9     [01-03-20 @ 00:43]      Phos  2.7     [01-03-20 @ 00:43]    TPro  4.9  /  Alb  2.0  /  TBili  0.4  /  DBili  0.2  /  AST  11  /  ALT  11  /  AlkPhos  69  [01-03-20 @ 00:43]    CAPILLARY BLOOD GLUCOSE  POCT Blood Glucose.: 130 mg/dL (03 Jan 2020 07:59)  POCT Blood Glucose.: 122 mg/dL (02 Jan 2020 22:04)  POCT Blood Glucose.: 131 mg/dL (02 Jan 2020 16:03)  POCT Blood Glucose.: 115 mg/dL (02 Jan 2020 10:58)    Prealbumin, Serum: 16 mg/dL (01-01-20 @ 02:48)  Prealbumin, Serum: 15 mg/dL (12-31-19 @ 03:11)  Prealbumin, Serum: 16 mg/dL (12-30-19 @ 07:53)  Prealbumin, Serum: 13 mg/dL (12-25-19 @ 04:24)  Prealbumin, Serum: 14 mg/dL (12-24-19 @ 02:35)    Triglycerides, Serum: 56 mg/dL (01.01.20 @ 00:42)  Triglycerides, Serum: 39 mg/dL (12.31.19 @ 00:56)  Triglycerides, Serum: 51 mg/dL (12.30.19 @ 01:08)  Triglycerides, Serum: 75 mg/dL (12.25.19 @ 02:29)      Culture - Acid Fast (12.31.19 @ 03:58)    AFB Specimen Processing: Concentration    Acid-Fast Smear: Negative: Performed At:  [facility] [id number]  Paresh Roche MD Ph:[phone]    Culture - Body Fluid with Gram Stain (12.30.19 @ 19:16)    Gram Stain:   No polymorphonuclear cells seen  No organisms seen  by cytocentrifuge    Specimen Source: .Body Fluid Pleural Fluid    Culture Results:   No growth    Culture - Fungal, Body Fluid (12.30.19 @ 19:16)    Specimen Source: .Body Fluid Pleural Fluid    Culture Results:   Testing in progress [facility] NUTRITION SUPPORT / TPN -- FOLLOW UP NOTE  --------------------------------------------------------------------------------    24 hour events/subjective:    finished Ancef for incisional cellulitis - continue to monitor  Tolerating TPN & eating dysphagia diet - eating well       Continue Octreotide, lomotil, Imodium, & tincture of opium        PPI to help decrease gastric secretions       high ostomy out put       repletion as per SICU  SOB improved w/ NC O2  Pt denies any pain/ dyspnea/ cough/ palp  no n/v   No f/c/s    Diet:  Diet, Dysphagia 1 Pureed-Nectar Consistency Fluid (12-25-19 @ 15:39)      Appetite: [  ]Poor [ x ]Adequate [  ]Good  Caloric intake:  [x   ]  Adequate   [   ] Inadequate    ROS: General/ GI see HPI  all other systems negative      ALLERGIES & MEDICATIONS  --------------------------------------------------------------------------------  ALLERGIES  IV Contrast (Hives)    STANDING INPATIENT MEDICATIONS    albuterol/ipratropium for Nebulization 3 milliLiter(s) Nebulizer every 6 hours  buDESOnide    Inhalation Suspension 0.5 milliGRAM(s) Inhalation every 12 hours  chlorhexidine 2% Cloths 1 Application(s) Topical <User Schedule>  diphenoxylate/atropine 2 Tablet(s) Oral <User Schedule>  enoxaparin Injectable 40 milliGRAM(s) SubCutaneous daily  fat emulsion (Fish Oil and Plant Based) 20% Infusion 20.8 mL/Hr IV Continuous <Continuous>  insulin lispro (HumaLOG) corrective regimen sliding scale   SubCutaneous three times a day before meals  insulin lispro (HumaLOG) corrective regimen sliding scale   SubCutaneous at bedtime  loperamide 16 milliGRAM(s) Oral <User Schedule>  octreotide  Injectable 100 MICROGram(s) SubCutaneous three times a day  opium Tincture 6 milliGRAM(s) Oral <User Schedule>  pantoprazole  Injectable 40 milliGRAM(s) IV Push daily  Parenteral Nutrition - Adult 1 Each TPN Continuous <Continuous>  sodium chloride 0.9%. 1000 milliLiter(s) IV Continuous <Continuous>      PRN INPATIENT MEDICATION  ondansetron Injectable 4 milliGRAM(s) IV Push every 6 hours PRN        VITALS/PHYSICAL EXAM  --------------------------------------------------------------------------------  T(C): 36.9 (01-03-20 @ 07:00), Max: 37.4 (01-02-20 @ 11:00)  HR: 89 (01-03-20 @ 08:00) (79 - 96)  BP: 125/62 (01-03-20 @ 08:00) (89/51 - 142/110)  RR: 29 (01-03-20 @ 08:00) (14 - 44)  SpO2: 97% (01-03-20 @ 08:00) (91% - 100%)  Wt(kg): --        01-02-20 @ 07:01  -  01-03-20 @ 07:00  --------------------------------------------------------  IN: 6384.6 mL / OUT: 5800 mL / NET: 584.6 mL    01-03-20 @ 07:01  -  01-03-20 @ 09:26  --------------------------------------------------------  IN: 0 mL / OUT: 50 mL / NET: -50 mL    PHYSICAL EXAM  --------------------------------------------------------------------------------  	Gen: guarded but stable, A&Ox3, NC O2  	HEENT: NC/AT, PERRL, supple neck, clear oropharynx, dried ruptured blisters  	GI: (+) BS, softly distended, non tender                   midline incision dressing  c/d/i w/o drainage                   (+)ostomy pink viable- thick bilious liquid stool like material              MSK: FROM, no contractures nor deformities  	Vascular: Equally Warm, no clubbing, cyanosis, nor edema                        Rt PICC dressing c/d/I   	Neuro: No focal deficits, intact sensation, weakened strength  	Psych: Normal affect and mood  	Skin: Warm, without rashes, good turgor        LABS/ CULTURES/ RADIOLOGY:              7.7    14.50 >-----------<  283      [01-03-20 @ 00:40]              25.5     138  |  104  |  16  ----------------------------<  139      [01-03-20 @ 00:43]  4.1   |  26  |  0.45        Ca     7.1     [01-03-20 @ 00:43]      Mg     1.9     [01-03-20 @ 00:43]      Phos  2.7     [01-03-20 @ 00:43]    TPro  4.9  /  Alb  2.0  /  TBili  0.4  /  DBili  0.2  /  AST  11  /  ALT  11  /  AlkPhos  69  [01-03-20 @ 00:43]    CAPILLARY BLOOD GLUCOSE  POCT Blood Glucose.: 130 mg/dL (03 Jan 2020 07:59)  POCT Blood Glucose.: 122 mg/dL (02 Jan 2020 22:04)  POCT Blood Glucose.: 131 mg/dL (02 Jan 2020 16:03)  POCT Blood Glucose.: 115 mg/dL (02 Jan 2020 10:58)    Prealbumin, Serum: 16 mg/dL (01-01-20 @ 02:48)  Prealbumin, Serum: 15 mg/dL (12-31-19 @ 03:11)  Prealbumin, Serum: 16 mg/dL (12-30-19 @ 07:53)  Prealbumin, Serum: 13 mg/dL (12-25-19 @ 04:24)  Prealbumin, Serum: 14 mg/dL (12-24-19 @ 02:35)    Triglycerides, Serum: 56 mg/dL (01.01.20 @ 00:42)  Triglycerides, Serum: 39 mg/dL (12.31.19 @ 00:56)  Triglycerides, Serum: 51 mg/dL (12.30.19 @ 01:08)  Triglycerides, Serum: 75 mg/dL (12.25.19 @ 02:29)      Culture - Acid Fast (12.31.19 @ 03:58)    AFB Specimen Processing: Concentration    Acid-Fast Smear: Negative: Performed At: [address] [id number]  Paresh Roche MD Ph:[phone]    Culture - Body Fluid with Gram Stain (12.30.19 @ 19:16)    Gram Stain:   No polymorphonuclear cells seen  No organisms seen  by cytocentrifuge    Specimen Source: .Body Fluid Pleural Fluid    Culture Results:   No growth    Culture - Fungal, Body Fluid (12.30.19 @ 19:16)    Specimen Source: .Body Fluid Pleural Fluid    Culture Results:   Testing in progress

## 2020-01-03 NOTE — PROGRESS NOTE ADULT - ASSESSMENT
73 y/o M presenting with septic shock and high grade SBO s/p exploratory laparotomy, lysis of adhesions, decompression of bowel via enterotomy w/ primary repair, and Abthera VAC placement on 12/6; s/p take down of Abthera, washout and re-application of the Abthera vac on 12/8. Now s/p SBR and end ileostomy on 12/10 acute respiratory distress now improving, Pneumothorax, hyperglycemia, delirium. Now still with persistent pneumothorax when chest pigtail clamped.   12/23 VSS on room air recommending IR drainage of left chest  12/24 No evidence of air leak to left chest tube. Tube clamped. Repeat chest cxr in 4 hours. Anticipate chest tube removal if lung remains expanded. Drainage of loculated left effusion discussed with IR. IR to reconsult for drainage once initial tube removed. Discussed plan with Dr Mayes and IR Fellow  12/24  On NC ,    VSSchest xray sm lt pl eff, left chest tube dc'd.  12/26    2l NC   co  sob,  lt side diminshed  12/27 VSS, CXR with unchanged loculated left pleural effusion- IR consulted for pigtail placement, states effusion can be drained via thoracentesis, pigtail placement not indicated at this time - Dr. Mayes to speak with IR attending.   12/30 Patient with persistent PTX  and left pleural effusion now for IR drainage with Dr Elkins.   12/31 HD stable, L PTC w/ high output, serosanguinous drainage, 990 ml overnight/ 2200 in 24 hrs. Tolerating 2L NC supplemental O2, Cyto pending, continue monitor drainage output.   1/1: LPTC still with High output-400/24h. Continue with pigtail cath drain.  1/ 2     lt pigtail draining  on lws  1/3 HD stable, left pigtail catheter continues to drain. Incentive spirometer encouraged, OOB and mobility, continue pulmonary toileting. Maintain chest tube to suction and monitor output.

## 2020-01-03 NOTE — PROGRESS NOTE ADULT - SUBJECTIVE AND OBJECTIVE BOX
Subjective: Patient seen and examined. No new events except as noted.   Remains in ICU   hypotensive but responding to fluids   no cp or sob       REVIEW OF SYSTEMS:    CONSTITUTIONAL: + weakness, fevers or chills  EYES/ENT: No visual changes;  No vertigo or throat pain   NECK: No pain or stiffness  RESPIRATORY: No cough, wheezing, hemoptysis; No shortness of breath  CARDIOVASCULAR: No chest pain or palpitations  GASTROINTESTINAL: No abdominal or epigastric pain. No nausea, vomiting, or hematemesis; No diarrhea or constipation. No melena or hematochezia.  GENITOURINARY: No dysuria, frequency or hematuria  NEUROLOGICAL: No numbness or weakness  SKIN: No itching, burning, rashes, or lesions   All other review of systems is negative unless indicated above.    MEDICATIONS:  MEDICATIONS  (STANDING):  albuterol/ipratropium for Nebulization 3 milliLiter(s) Nebulizer every 6 hours  buDESOnide    Inhalation Suspension 0.5 milliGRAM(s) Inhalation every 12 hours  chlorhexidine 2% Cloths 1 Application(s) Topical <User Schedule>  diphenoxylate/atropine 2 Tablet(s) Oral <User Schedule>  enoxaparin Injectable 40 milliGRAM(s) SubCutaneous daily  fat emulsion (Fish Oil and Plant Based) 20% Infusion 20.8 mL/Hr (20.8 mL/Hr) IV Continuous <Continuous>  insulin lispro (HumaLOG) corrective regimen sliding scale   SubCutaneous three times a day before meals  insulin lispro (HumaLOG) corrective regimen sliding scale   SubCutaneous at bedtime  loperamide 16 milliGRAM(s) Oral <User Schedule>  octreotide  Injectable 100 MICROGram(s) SubCutaneous three times a day  opium Tincture 6 milliGRAM(s) Oral <User Schedule>  pantoprazole  Injectable 40 milliGRAM(s) IV Push daily  Parenteral Nutrition - Adult 1 Each (75 mL/Hr) TPN Continuous <Continuous>  Parenteral Nutrition - Adult 1 Each (75 mL/Hr) TPN Continuous <Continuous>  sodium chloride 0.9%. 1000 milliLiter(s) (10 mL/Hr) IV Continuous <Continuous>      PHYSICAL EXAM:  T(C): 37.3 (01-03-20 @ 11:00), Max: 37.3 (01-03-20 @ 11:00)  HR: 93 (01-03-20 @ 13:00) (79 - 96)  BP: 116/57 (01-03-20 @ 13:00) (89/51 - 142/110)  RR: 26 (01-03-20 @ 13:00) (14 - 44)  SpO2: 94% (01-03-20 @ 13:00) (91% - 100%)  Wt(kg): --  I&O's Summary    02 Jan 2020 07:01  -  03 Jan 2020 07:00  --------------------------------------------------------  IN: 6384.6 mL / OUT: 5800 mL / NET: 584.6 mL    03 Jan 2020 07:01  -  03 Jan 2020 13:25  --------------------------------------------------------  IN: 1040 mL / OUT: 1250 mL / NET: -210 mL            Appearance: NAD   HEENT:   Dry oral mucosa, crusted lips   Lymphatic: No lymphadenopathy   Cardiovascular: Normal S1 S2, No JVD, No murmurs , Peripheral pulses palpable 2+ bilaterally  Respiratory: Decreased bs   Gastrointestinal:  Soft, NT, ND. Ostomy pink with output. Midline staples in place, midline incision site is c/d/i   Skin: No rashes, No ecchymoses, No cyanosis, warm to touch  Musculoskeletal: Decreased  range of motion and strength  Psychiatry:  mildly sedated   Ext: No edema +PICC line   +rosas   +rectal tube           LABS:    CARDIAC MARKERS:                                7.7    14.50 )-----------( 283      ( 03 Jan 2020 00:40 )             25.5     01-03    138  |  104  |  16  ----------------------------<  139<H>  4.1   |  26  |  0.45<L>    Ca    7.1<L>      03 Jan 2020 00:43  Phos  2.7     01-03  Mg     1.9     01-03    TPro  4.9<L>  /  Alb  2.0<L>  /  TBili  0.4  /  DBili  0.2  /  AST  11  /  ALT  11  /  AlkPhos  69  01-03    proBNP:   Lipid Profile:   HgA1c:   TSH:             TELEMETRY: 	 SR   ECG:  	  RADIOLOGY:  < from: Xray Chest 1 View- PORTABLE-Routine (01.03.20 @ 07:00) >    EXAM:  XR CHEST PORTABLE ROUTINE 1V                            PROCEDURE DATE:  01/03/2020            INTERPRETATION:  CLINICAL INFORMATION: Left pigtail catheter.    EXAM: Frontal chest radiograph dated 1/3/2020.    COMPARISON: Chest radiograph from 1/2/2020.    FINDINGS:  Right PICC terminates in the SVC. Left chest tube in unchanged position.  Unchanged small bilateral pleural effusions.  The cardiomediastinal silhouette is within normal limits.  Patchy right basilar opacity.    IMPRESSION:   Unchanged small bilateral pleural effusions.  Right basilar opacity suspicious for pneumonia.                JS PATEL M.D., RADIOLOGY RESIDENT  This document has been electronically signed.  ANNETTA FERREIRA M.D., ATTENDING RADIOLOGIST  This document has been electronically signed. Isma  3 2020  9:52AM                < end of copied text >    DIAGNOSTIC TESTING:  [ ] Echocardiogram:  [ ]  Catheterization:  [ ] Stress Test:    OTHER:

## 2020-01-03 NOTE — PROGRESS NOTE ADULT - ASSESSMENT
A/P: 74 year old male w/ PMH of COPD and EtOH dependence with PSH/o appy, prostate surgery, & spine surgery  septic shock and high grade SBO s/p exploratory laparotomy, lysis of adhesions, decompression of bowel via enterotomy w/ primary repair, and Abthera VAC placement on 12/6; s/p take down of Abthera, washout and re-application of the Abthera vac on 12/8. Now s/p SBR and end ileostomy on 12/10.   TPN consulted to assist w/ management of pt's nutrition in pt w/ prolonged hospital course now tolerating diet but has high Ileostomy output     Continue TPN at full strength in pt w/ severe Protein-Calorie Malnutrition      -  high ostomy output- pt eating more regularly- continue to monitor      - Tolerating Dysphagia 1 Pureed-Nectar Consistency Fluid       - Plan to continue TPN & octreotide, lomotil, tincture of opium, & imodium  Pt tolerating TPN at GOAL:  120 grams amino acids (800ml 15% a.a.)  210 grams dextrose (300ml 70% dex)  50 grams SMOFlipid (250ml 20%IVFE @ 20.8ml/hr x 12 hours)  in 1800mL volume  Micronutrients: 10ml MVI, 3ml MTE-5    HypoNa  improving & will continue to monitor-maintaining K levels will help improve Na  Elevated K -improving, will keep KCl of 40 mEq KCl in TPN          possibly due to Octreotide  HypoPhos resolving, continue NaPhos of 45mMol   Strict Intake and Output-       high ileostomy output - greatly improved, continue to monitor as pt eating more consistently        pt now on increased lomotil, imodium, tincture of opium, & Octreotide   Consider sending ostomy output for electrolyte evaluation or fecal fat testing to see what pt is absorbing        to assess ongoing needs in pt with concerns for Short Gut Syndrome as pt has high ostomy output  Hyperglycemia - improving - Insulin removed from TPN      Fingersticks & ISS coverage - to be ordered by primary team  Ancef course for cellulitis of incision - continue to monitor improvement  IR placed pigtail Lt chest effusion- still high drainage, continue to monitor as per SICU/ IR/ CTS  PICC w/dedicated port for only TPN - maintenance as per protocol  Weights three times a week  Monitor BMP, Mg, Ionized Ca, Phosphorus daily  Continue to monitor Triglycerides and Pre-albumin weekly  Continue as per SICU / Surgery, will follow with you, D/w primary team    Andreina Hubbard PA-C  TPN team, pager [phone]  D/w Ana M Siegel

## 2020-01-03 NOTE — PROGRESS NOTE ADULT - PROBLEM SELECTOR PLAN 1
sp  IR placed lt pigtail  keep lws  daily chest xray while pl tube in place  continue monitor chest tube output  incentive spx  pulmonary toileting  will follow sp  IR placed lt pigtail  keep lws  daily chest xray while pl tube in place  continue monitor chest tube output  incentive spx  pulmonary toileting  oob and mobilize  analgesia for back pain  will follow

## 2020-01-03 NOTE — PROGRESS NOTE ADULT - SUBJECTIVE AND OBJECTIVE BOX
Interventional Radiology    S/P left pigtail chest tube on 12/30/19.  Pt denies chest pain or shortness of breath currently.    Vital Signs Last 24 Hrs  T(C): 37.3 (03 Jan 2020 11:00), Max: 37.3 (03 Jan 2020 11:00)  T(F): 99.1 (03 Jan 2020 11:00), Max: 99.1 (03 Jan 2020 11:00)  HR: 91 (03 Jan 2020 11:00) (79 - 96)  BP: 99/59 (03 Jan 2020 11:00) (89/51 - 142/110)  BP(mean): 75 (03 Jan 2020 11:00) (65 - 123)  RR: 31 (03 Jan 2020 11:00) (14 - 44)  SpO2: 96% (03 Jan 2020 11:00) (91% - 100%)    General Appearance: Awake, NAD, seen at bedside resting comfortably. on nasal cannula    Procedure Site (Left chest): catheter is intact and draining to pleur-evac with wall suction on.  discussed with covering nurse to replace dressing.    I&O's Detail    OUT:    Chest Tube: 1/3/20 7AM 800 mL     LABS:                        7.7    14.50 )-----------( 283      ( 03 Jan 2020 00:40 )             25.5     01-03    138  |  104  |  16  ----------------------------<  139<H>  4.1   |  26  |  0.45<L>    Ca    7.1<L>      03 Jan 2020 00:43  Phos  2.7     01-03  Mg     1.9     01-03    TPro  4.9<L>  /  Alb  2.0<L>  /  TBili  0.4  /  DBili  0.2  /  AST  11  /  ALT  11  /  AlkPhos  69  01-03    < from: Xray Chest 1 View- PORTABLE-Routine (01.03.20 @ 07:00) >  IMPRESSION:   Unchanged small bilateral pleural effusions.  Right basilar opacity suspicious for pneumonia.    < end of copied text >    A/P  74y Male with h/o left pleural effusion S/P left pigtail chest tube placement on 12/30/19    Continue global medical management as per SICU/primary team.  Monitor and record outputs from the Left pigtail chest tube.  Serial CXR while the pigtail chest tube is in.     Case discussed with IR attending Dr. James.    Wicho Leonardo, RPA-C  [facility] [id number]  Ext [phone]

## 2020-01-03 NOTE — PROGRESS NOTE ADULT - ATTENDING COMMENTS
Patient seen and examined  Awake, alert, oriented  Still with intermittent hypotensive episodes  Chest tube = 800cc / 24 hours  Ileostomy = 3500cc / 24 hours    - Receiving fluid replacements for chest / bowel losses.  Hypotension likely secondary to intermittent hypovolemia.  - May eventually need pleurodesis if chest tube continues to have high outputs  - Continue antiperistaltic agents, hopefully to decrease ileostomy tube output.

## 2020-01-03 NOTE — PROGRESS NOTE ADULT - ASSESSMENT
Assessment	  73 y/o M presenting with septic shock and high grade SBO s/p exploratory laparotomy, lysis of adhesions, decompression of bowel via enterotomy w/ primary repair, and Abthera VAC placement on 12/6; s/p take down of Abthera, washout and re-application of the Abthera vac on 12/8. Now s/p SBR and end ileostomy on 12/10 acute respiratory distress now improving, Pneumothorax, hyperglycemia, delirium. s/p L chest tube placement by IR 12/30 for pleural effusion.     Recommendations:  - c/w dysphagia diet per speech and swallow   - monitor chest tube output   - c/w abx for wound infection  - cont tpn, monitor ostomy output  - no surgical intervention at this time for high ostomy output - too early for reconnection. Continue repletions.  - continue excellent supportive care per SICU    RED SURGERY  [id number]

## 2020-01-04 LAB
ANION GAP SERPL CALC-SCNC: 8 MMOL/L — SIGNIFICANT CHANGE UP (ref 5–17)
BUN SERPL-MCNC: 15 MG/DL — SIGNIFICANT CHANGE UP (ref 7–23)
CALCIUM SERPL-MCNC: 7.5 MG/DL — LOW (ref 8.4–10.5)
CHLORIDE SERPL-SCNC: 102 MMOL/L — SIGNIFICANT CHANGE UP (ref 96–108)
CO2 SERPL-SCNC: 28 MMOL/L — SIGNIFICANT CHANGE UP (ref 22–31)
CREAT SERPL-MCNC: 0.43 MG/DL — LOW (ref 0.5–1.3)
CULTURE RESULTS: SIGNIFICANT CHANGE UP
GLUCOSE BLDC GLUCOMTR-MCNC: 116 MG/DL — HIGH (ref 70–99)
GLUCOSE BLDC GLUCOMTR-MCNC: 134 MG/DL — HIGH (ref 70–99)
GLUCOSE BLDC GLUCOMTR-MCNC: 144 MG/DL — HIGH (ref 70–99)
GLUCOSE SERPL-MCNC: 127 MG/DL — HIGH (ref 70–99)
HCT VFR BLD CALC: 26.5 % — LOW (ref 39–50)
HGB BLD-MCNC: 8.1 G/DL — LOW (ref 13–17)
MAGNESIUM SERPL-MCNC: 1.8 MG/DL — SIGNIFICANT CHANGE UP (ref 1.6–2.6)
MCHC RBC-ENTMCNC: 29.7 PG — SIGNIFICANT CHANGE UP (ref 27–34)
MCHC RBC-ENTMCNC: 30.6 GM/DL — LOW (ref 32–36)
MCV RBC AUTO: 97.1 FL — SIGNIFICANT CHANGE UP (ref 80–100)
NRBC # BLD: 0 /100 WBCS — SIGNIFICANT CHANGE UP (ref 0–0)
PHOSPHATE SERPL-MCNC: 2.7 MG/DL — SIGNIFICANT CHANGE UP (ref 2.5–4.5)
PLATELET # BLD AUTO: 292 K/UL — SIGNIFICANT CHANGE UP (ref 150–400)
POTASSIUM SERPL-MCNC: 4.2 MMOL/L — SIGNIFICANT CHANGE UP (ref 3.5–5.3)
POTASSIUM SERPL-SCNC: 4.2 MMOL/L — SIGNIFICANT CHANGE UP (ref 3.5–5.3)
RBC # BLD: 2.73 M/UL — LOW (ref 4.2–5.8)
RBC # FLD: 15.2 % — HIGH (ref 10.3–14.5)
SODIUM SERPL-SCNC: 138 MMOL/L — SIGNIFICANT CHANGE UP (ref 135–145)
SPECIMEN SOURCE: SIGNIFICANT CHANGE UP
WBC # BLD: 15.19 K/UL — HIGH (ref 3.8–10.5)
WBC # FLD AUTO: 15.19 K/UL — HIGH (ref 3.8–10.5)

## 2020-01-04 PROCEDURE — 99232 SBSQ HOSP IP/OBS MODERATE 35: CPT

## 2020-01-04 PROCEDURE — 71045 X-RAY EXAM CHEST 1 VIEW: CPT | Mod: 26

## 2020-01-04 RX ORDER — SODIUM CHLORIDE 9 MG/ML
1000 INJECTION INTRAMUSCULAR; INTRAVENOUS; SUBCUTANEOUS
Refills: 0 | Status: DISCONTINUED | OUTPATIENT
Start: 2020-01-04 | End: 2020-01-05

## 2020-01-04 RX ORDER — MAGNESIUM SULFATE 500 MG/ML
2 VIAL (ML) INJECTION ONCE
Refills: 0 | Status: COMPLETED | OUTPATIENT
Start: 2020-01-04 | End: 2020-01-04

## 2020-01-04 RX ORDER — I.V. FAT EMULSION 20 G/100ML
20.8 EMULSION INTRAVENOUS
Qty: 50 | Refills: 0 | Status: DISCONTINUED | OUTPATIENT
Start: 2020-01-04 | End: 2020-01-05

## 2020-01-04 RX ORDER — ELECTROLYTE SOLUTION,INJ
1 VIAL (ML) INTRAVENOUS
Refills: 0 | Status: DISCONTINUED | OUTPATIENT
Start: 2020-01-04 | End: 2020-01-04

## 2020-01-04 RX ADMIN — Medication 16 MILLIGRAM(S): at 17:12

## 2020-01-04 RX ADMIN — Medication 3 MILLILITER(S): at 11:50

## 2020-01-04 RX ADMIN — Medication 3 MILLILITER(S): at 05:36

## 2020-01-04 RX ADMIN — OCTREOTIDE ACETATE 100 MICROGRAM(S): 200 INJECTION, SOLUTION INTRAVENOUS; SUBCUTANEOUS at 13:23

## 2020-01-04 RX ADMIN — I.V. FAT EMULSION 20.8 ML/HR: 20 EMULSION INTRAVENOUS at 17:20

## 2020-01-04 RX ADMIN — Medication 16 MILLIGRAM(S): at 22:03

## 2020-01-04 RX ADMIN — Medication 2 TABLET(S): at 17:29

## 2020-01-04 RX ADMIN — Medication 50 GRAM(S): at 06:56

## 2020-01-04 RX ADMIN — Medication 2 TABLET(S): at 22:03

## 2020-01-04 RX ADMIN — MORPHINE 6 MILLIGRAM(S): 10 SOLUTION ORAL at 08:25

## 2020-01-04 RX ADMIN — MORPHINE 6 MILLIGRAM(S): 10 SOLUTION ORAL at 17:12

## 2020-01-04 RX ADMIN — Medication 0.5 MILLIGRAM(S): at 05:38

## 2020-01-04 RX ADMIN — Medication 2 TABLET(S): at 13:23

## 2020-01-04 RX ADMIN — CHLORHEXIDINE GLUCONATE 1 APPLICATION(S): 213 SOLUTION TOPICAL at 06:27

## 2020-01-04 RX ADMIN — Medication 16 MILLIGRAM(S): at 08:26

## 2020-01-04 RX ADMIN — SODIUM CHLORIDE 10 MILLILITER(S): 9 INJECTION INTRAMUSCULAR; INTRAVENOUS; SUBCUTANEOUS at 19:15

## 2020-01-04 RX ADMIN — Medication 1 EACH: at 17:20

## 2020-01-04 RX ADMIN — Medication 0.5 MILLIGRAM(S): at 17:23

## 2020-01-04 RX ADMIN — Medication 2 TABLET(S): at 08:25

## 2020-01-04 RX ADMIN — Medication 250 MILLIMOLE(S): at 08:26

## 2020-01-04 RX ADMIN — OCTREOTIDE ACETATE 100 MICROGRAM(S): 200 INJECTION, SOLUTION INTRAVENOUS; SUBCUTANEOUS at 06:27

## 2020-01-04 RX ADMIN — ENOXAPARIN SODIUM 40 MILLIGRAM(S): 100 INJECTION SUBCUTANEOUS at 11:32

## 2020-01-04 RX ADMIN — PANTOPRAZOLE SODIUM 40 MILLIGRAM(S): 20 TABLET, DELAYED RELEASE ORAL at 11:32

## 2020-01-04 RX ADMIN — MORPHINE 6 MILLIGRAM(S): 10 SOLUTION ORAL at 13:24

## 2020-01-04 RX ADMIN — Medication 16 MILLIGRAM(S): at 13:24

## 2020-01-04 RX ADMIN — Medication 3 MILLILITER(S): at 17:23

## 2020-01-04 RX ADMIN — Medication 3 MILLILITER(S): at 00:18

## 2020-01-04 RX ADMIN — MORPHINE 6 MILLIGRAM(S): 10 SOLUTION ORAL at 22:03

## 2020-01-04 RX ADMIN — OCTREOTIDE ACETATE 100 MICROGRAM(S): 200 INJECTION, SOLUTION INTRAVENOUS; SUBCUTANEOUS at 22:02

## 2020-01-04 NOTE — PROGRESS NOTE ADULT - SUBJECTIVE AND OBJECTIVE BOX
Subjective: Patient seen and examined. No new events except as noted.   remains in iCU   No cp or sob       REVIEW OF SYSTEMS:    CONSTITUTIONAL: + weakness, fevers or chills  EYES/ENT: No visual changes;  No vertigo or throat pain   NECK: No pain or stiffness  RESPIRATORY: No cough, wheezing, hemoptysis; No shortness of breath  CARDIOVASCULAR: No chest pain or palpitations  GASTROINTESTINAL: No abdominal or epigastric pain. No nausea, vomiting, or hematemesis; No diarrhea or constipation. No melena or hematochezia.  GENITOURINARY: No dysuria, frequency or hematuria  NEUROLOGICAL: No numbness or weakness  SKIN: No itching, burning, rashes, or lesions   All other review of systems is negative unless indicated above.    MEDICATIONS:  MEDICATIONS  (STANDING):  albuterol/ipratropium for Nebulization 3 milliLiter(s) Nebulizer every 6 hours  buDESOnide    Inhalation Suspension 0.5 milliGRAM(s) Inhalation every 12 hours  chlorhexidine 2% Cloths 1 Application(s) Topical <User Schedule>  diphenoxylate/atropine 2 Tablet(s) Oral <User Schedule>  enoxaparin Injectable 40 milliGRAM(s) SubCutaneous daily  fat emulsion (Fish Oil and Plant Based) 20% Infusion 20.8 mL/Hr (20.8 mL/Hr) IV Continuous <Continuous>  insulin lispro (HumaLOG) corrective regimen sliding scale   SubCutaneous three times a day before meals  insulin lispro (HumaLOG) corrective regimen sliding scale   SubCutaneous at bedtime  loperamide 16 milliGRAM(s) Oral <User Schedule>  octreotide  Injectable 100 MICROGram(s) SubCutaneous three times a day  opium Tincture 6 milliGRAM(s) Oral <User Schedule>  pantoprazole  Injectable 40 milliGRAM(s) IV Push daily  Parenteral Nutrition - Adult 1 Each (75 mL/Hr) TPN Continuous <Continuous>  sodium chloride 0.9%. 1000 milliLiter(s) (10 mL/Hr) IV Continuous <Continuous>      PHYSICAL EXAM:  T(C): 36.9 (01-04-20 @ 19:00), Max: 37.2 (01-04-20 @ 03:00)  HR: 93 (01-04-20 @ 20:00) (83 - 102)  BP: 123/63 (01-04-20 @ 20:00) (111/55 - 166/73)  RR: 27 (01-04-20 @ 20:00) (16 - 36)  SpO2: 94% (01-04-20 @ 20:00) (86% - 100%)  Wt(kg): --  I&O's Summary    03 Jan 2020 07:01  -  04 Jan 2020 07:00  --------------------------------------------------------  IN: 5849.6 mL / OUT: 6000 mL / NET: -150.4 mL    04 Jan 2020 07:01  -  04 Jan 2020 20:23  --------------------------------------------------------  IN: 4177 mL / OUT: 3480 mL / NET: 697 mL              Appearance: NAD   HEENT:   Dry oral mucosa, crusted lips   Lymphatic: No lymphadenopathy   Cardiovascular: Normal S1 S2, No JVD, No murmurs , Peripheral pulses palpable 2+ bilaterally  Respiratory: Decreased bs   Gastrointestinal:  Soft, NT, ND. Ostomy pink with output. Midline staples in place, midline incision site is c/d/i   Skin: No rashes, No ecchymoses, No cyanosis, warm to touch  Musculoskeletal: Decreased  range of motion and strength  Psychiatry:  mildly sedated   Ext: No edema +PICC line   +rosas   +rectal tube           LABS:    CARDIAC MARKERS:                                8.1    15.19 )-----------( 292      ( 04 Jan 2020 06:03 )             26.5     01-04    138  |  102  |  15  ----------------------------<  127<H>  4.2   |  28  |  0.43<L>    Ca    7.5<L>      04 Jan 2020 06:03  Phos  2.7     01-04  Mg     1.8     01-04    TPro  4.9<L>  /  Alb  2.0<L>  /  TBili  0.4  /  DBili  0.2  /  AST  11  /  ALT  11  /  AlkPhos  69  01-03    proBNP:   Lipid Profile:   HgA1c:   TSH:             TELEMETRY: SR	    ECG:  	  RADIOLOGY:   < from: Xray Chest 1 View- PORTABLE-Routine (01.04.20 @ 07:07) >    EXAM:  XR CHEST PORTABLE ROUTINE 1V                            PROCEDURE DATE:  01/04/2020            INTERPRETATION:  CLINICAL INFORMATION: Status post pigtail catheter placement for pleural effusion    EXAM: Chest X-ray, AP View    COMPARISON: Chest x-ray 1/3/2020, CT chest 12/30/2019    FINDINGS:  Right-sided PICC line with the tip in the SVC.    Left-sided chest tube is unchanged in position. There is no pneumothorax. Bilateral pleural effusions with adjacent atelectasis.     Heart size cannot be accurately assessed in this projection.    No acute osseous findings.      IMPRESSION:   Bilateral pleural effusions with adjacent atelectasis. Patchy opacity in the right lower lung field, may represent pneumonia.                MERY LYNN M.D., RADIOLOGY RESIDENT  This document has been electronically signed.  CALLY PANDEY M.D., ATTENDING RADIOLOGIST  This document has been electronically signed. Jan 4 2020  2:16PM                < end of copied text >  DIAGNOSTIC TESTING:  [ ] Echocardiogram:  [ ]  Catheterization:  [ ] Stress Test:    OTHER:

## 2020-01-04 NOTE — PROGRESS NOTE ADULT - SUBJECTIVE AND OBJECTIVE BOX
Subjective: Pt states" " Denies any CP, SOB, palpitations. No acute events overnight.    Vital Signs:  Vital Signs Last 24 Hrs  T(C): 37.2 (01-04-20 @ 07:00), Max: 37.2 (01-03-20 @ 19:00)  T(F): 99 (01-04-20 @ 07:00), Max: 99 (01-03-20 @ 19:00)  HR: 89 (01-04-20 @ 09:00) (79 - 134)  BP: 158/74 (01-04-20 @ 09:00) (102/54 - 166/73)  RR: 24 (01-04-20 @ 09:00) (16 - 36)  SpO2: 96% (01-04-20 @ 09:00) (87% - 99%) on (O2)        Relevant labs, radiology and Medications reviewed                        8.1    15.19 )-----------( 292      ( 04 Jan 2020 06:03 )             26.5     01-04    138  |  102  |  15  ----------------------------<  127<H>  4.2   |  28  |  0.43<L>    Ca    7.5<L>      04 Jan 2020 06:03  Phos  2.7     01-04  Mg     1.8     01-04    TPro  4.9<L>  /  Alb  2.0<L>  /  TBili  0.4  /  DBili  0.2  /  AST  11  /  ALT  11  /  AlkPhos  69  01-03      MEDICATIONS  (STANDING):  albuterol/ipratropium for Nebulization 3 milliLiter(s) Nebulizer every 6 hours  buDESOnide    Inhalation Suspension 0.5 milliGRAM(s) Inhalation every 12 hours  chlorhexidine 2% Cloths 1 Application(s) Topical <User Schedule>  diphenoxylate/atropine 2 Tablet(s) Oral <User Schedule>  enoxaparin Injectable 40 milliGRAM(s) SubCutaneous daily  insulin lispro (HumaLOG) corrective regimen sliding scale   SubCutaneous three times a day before meals  insulin lispro (HumaLOG) corrective regimen sliding scale   SubCutaneous at bedtime  loperamide 16 milliGRAM(s) Oral <User Schedule>  octreotide  Injectable 100 MICROGram(s) SubCutaneous three times a day  opium Tincture 6 milliGRAM(s) Oral <User Schedule>  pantoprazole  Injectable 40 milliGRAM(s) IV Push daily  Parenteral Nutrition - Adult 1 Each (75 mL/Hr) TPN Continuous <Continuous>  sodium chloride 0.9%. 1000 milliLiter(s) (10 mL/Hr) IV Continuous <Continuous>    MEDICATIONS  (PRN):  ondansetron Injectable 4 milliGRAM(s) IV Push every 6 hours PRN Nausea and/or Vomiting      I&O's Summary    03 Jan 2020 07:01  -  04 Jan 2020 07:00  --------------------------------------------------------  IN: 5849.6 mL / OUT: 6000 mL / NET: -150.4 mL    04 Jan 2020 07:01  -  04 Jan 2020 11:25  --------------------------------------------------------  IN: 282.5 mL / OUT: 425 mL / NET: -142.5 mL        IMAGING    CXR:    CT Chest:    PAST MEDICAL & SURGICAL HISTORY:  COPD with hypoxia  ETOHism  ETOH abuse  History of lumbosacral spine surgery  History of prostate surgery  History of appendectomy       Physical Exam:  Neurology: A&Ox3, nonfocal, WILEY x 4, NAD  Respiratory: B/L BS CTA, diminished at bases, No wheezing, rales, rhonchi  CV: RRR, S1S2

## 2020-01-04 NOTE — PROGRESS NOTE ADULT - SUBJECTIVE AND OBJECTIVE BOX
HISTORY:  73 y/o male with a past medical history of COPD and EtOH dependence who presented on 12/6/2019 with abdominal pain, nausea, hematemesis, and poor PO intake for ~2-3 days. In the ED, he was found to be hypotensive & tachycardic. Labs revealed an CHI and lactic acidosis. Imaging revealed a high grade SBO in the RLQ on CT scan. Patient was taken to the OR emergently for an exploratory laparotomy, lysis of adhesions, decompression of bowel via enterotomy w/ primary repair, and Abthera VAC placement. The distal 50% of the bowel appeared dusky but viable at that time. He was requiring vasopressor support and was left intubated at the end of the case so patient was admitted to SICU post-operatively. He was taken back to the OR on 12/8/2019 for re-exploration. The proximal 165 cm of small bowel appeared pink & viable but beyond that had patchy areas of ischemia. Decision was made to give the bowel more time to demarcate before resecting in order to preserve as much small bowel as possible so Abthera VAC was replaced. He returned to the OR on 12/10/2019 for re-exploration, small bowel resection of 150 cm (150 cm remaining), ileocecetomy, end ileostomy, mucous fistula, and abdominal closure.

## 2020-01-04 NOTE — PROGRESS NOTE ADULT - ASSESSMENT
Assessment	  75 y/o M presenting with septic shock and high grade SBO s/p exploratory laparotomy, lysis of adhesions, decompression of bowel via enterotomy w/ primary repair, and Abthera VAC placement on 12/6; s/p take down of Abthera, washout and re-application of the Abthera vac on 12/8. Now s/p SBR and end ileostomy on 12/10 acute respiratory distress now improving, Pneumothorax, hyperglycemia, delirium. s/p L chest tube placement by IR 12/30 for pleural effusion.     Recommendations:  - c/w dysphagia diet per speech and swallow   - monitor chest tube output   - c/w abx for wound infection  - cont tpn, monitor ostomy output  - no surgical intervention at this time for high ostomy output - too early for reconnection. Continue repletions.  - continue excellent supportive care per SICU    RED SURGERY  [id number] Assessment	  73 y/o M presenting with septic shock and high grade SBO s/p exploratory laparotomy, lysis of adhesions, decompression of bowel via enterotomy w/ primary repair, and Abthera VAC placement on 12/6; s/p take down of Abthera, washout and re-application of the Abthera vac on 12/8. 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 s/p L chest tube placement by IR 12/30 for pleural effusion.     Recommendations:  - c/w dysphagia diet per speech and swallow   - monitor chest tube output - f/u CTSx for possible pleurodesis for ongoing output   - c/w abx for wound infection  - cont tpn, monitor ostomy output  - no surgical intervention at this time for high ostomy output - too early for reconnection. Continue repletions.  - continue excellent supportive care per SICU    RED SURGERY  [id number]

## 2020-01-04 NOTE — PROGRESS NOTE ADULT - SUBJECTIVE AND OBJECTIVE BOX
[facility] NUTRITION SUPPORT--  Attending/ PA FOLLOW UP NOTE      24 hour events/subjective: Denies Palpitations, chest pain, shortness of breath. Denies nausea nor vomiting nor abdominal pain.    Most recently, left pleural effusion s/p pigtail catheter w/ IR (12/30), dysphagia, and oral HSV lesions.    24 HOUR EVENTS:  - Calorie count initiated   - No acute events O/N.    PAST HISTORY  --------------------------------------------------------------------------------  No significant changes to PMH, PSH, FHx, SHx, unless otherwise noted    ALLERGIES & MEDICATIONS  --------------------------------------------------------------------------------  Allergies    IV Contrast (Hives)    Intolerances      Standing Inpatient Medications  albuterol/ipratropium for Nebulization 3 milliLiter(s) Nebulizer every 6 hours  buDESOnide    Inhalation Suspension 0.5 milliGRAM(s) Inhalation every 12 hours  chlorhexidine 2% Cloths 1 Application(s) Topical <User Schedule>  diphenoxylate/atropine 2 Tablet(s) Oral <User Schedule>  enoxaparin Injectable 40 milliGRAM(s) SubCutaneous daily  fat emulsion (Fish Oil and Plant Based) 20% Infusion 20.8 mL/Hr IV Continuous <Continuous>  insulin lispro (HumaLOG) corrective regimen sliding scale   SubCutaneous three times a day before meals  insulin lispro (HumaLOG) corrective regimen sliding scale   SubCutaneous at bedtime  loperamide 16 milliGRAM(s) Oral <User Schedule>  octreotide  Injectable 100 MICROGram(s) SubCutaneous three times a day  opium Tincture 6 milliGRAM(s) Oral <User Schedule>  pantoprazole  Injectable 40 milliGRAM(s) IV Push daily  Parenteral Nutrition - Adult 1 Each TPN Continuous <Continuous>  sodium chloride 0.9%. 1000 milliLiter(s) IV Continuous <Continuous>    PRN Inpatient Medications  ondansetron Injectable 4 milliGRAM(s) IV Push every 6 hours PRN      REVIEW OF SYSTEMS  --------------------------------------------------------------------------------  Gen: as per HPI  Skin: No rashes  Head/Eyes/Ears/Mouth: No headache;No sore throat  Respiratory: No dyspnea, cough,   CV: No chest pain, PND, orthopnea  GI: as per HPI  : No increased frequency, dysuria, hematuria, nocturia  MSK: No joint pain/swelling; no back pain; no edema  Neuro: No dizziness/lightheadedness, weakness, seizures, numbness, tingling  Psych: No significant nervousness, anxiety, stress, depression    All other systems were reviewed and are negative, except as noted.      LABS/STUDIES  --------------------------------------------------------------------------------              8.1    15.19 >-----------<  292      [01-04-20 @ 06:03]              26.5     138  |  102  |  15  ----------------------------<  127      [01-04-20 @ 06:03]  4.2   |  28  |  0.43        Ca     7.5     [01-04-20 @ 06:03]      Mg     1.8     [01-04-20 @ 06:03]      Phos  2.7     [01-04-20 @ 06:03]    TPro  4.9  /  Alb  2.0  /  TBili  0.4  /  DBili  0.2  /  AST  11  /  ALT  11  /  AlkPhos  69  [01-03-20 @ 00:43]            Glucose, Serum: 127 mg/dL (01-04-20 @ 06:03)    PrealbuminPrealbumin, Serum: 16 mg/dL (01-01-20 @ 02:48)  Prealbumin, Serum: 15 mg/dL (12-31-19 @ 03:11)  Prealbumin, Serum: 16 mg/dL (12-30-19 @ 07:53)  Prealbumin, Serum: 13 mg/dL (12-25-19 @ 04:24)  Prealbumin, Serum: 14 mg/dL (12-24-19 @ 02:35)    Triglycerides      01-03-20 @ 07:01  -  01-04-20 @ 07:00  --------------------------------------------------------  IN: 5849.6 mL / OUT: 6000 mL / NET: -150.4 mL    01-04-20 @ 07:01  -  01-04-20 @ 09:29  --------------------------------------------------------  IN: 282.5 mL / OUT: 425 mL / NET: -142.5 mL        VITALS/PHYSICAL EXAM  --------------------------------------------------------------------------------  T(C): 37.2 (01-04-20 @ 07:00), Max: 37.3 (01-03-20 @ 11:00)  HR: 89 (01-04-20 @ 09:00) (79 - 134)  BP: 158/74 (01-04-20 @ 09:00) (99/59 - 166/73)  RR: 24 (01-04-20 @ 09:00) (16 - 36)  SpO2: 96% (01-04-20 @ 09:00) (87% - 99%)  Wt(kg): --    Physical Exam:  	Gen: NAD, well-appearing  	HEENT: PERRL, midline trach          Chest: non labored breathing, equal chest expansion b/l  	Abd: +BS, soft, nontender/nondistended  	UE: Warm, FROM, intact strength; no edema; no asterixis  	LE: Warm, FROM, intact strength; no edema  	Neuro: No focal deficits, intact gait  	Psych: Normal affect and mood  	Skin: Warm, without rashes [facility] NUTRITION SUPPORT--  Attending/ PA FOLLOW UP NOTE      24 hour events/subjective: No acute events O/N. Pt on TPN since 12/14, and tolerating without issues- denies palpitations, chest pain, shortness of breath. Denies nausea nor vomiting nor abdominal pain. Calorie count initiated. Most recently, left pleural effusion s/p pigtail catheter w/ IR (12/30), dysphagia, and oral HSV lesions.      PAST HISTORY  --------------------------------------------------------------------------------  No significant changes to PMH, PSH, FHx, SHx, unless otherwise noted    ALLERGIES & MEDICATIONS  --------------------------------------------------------------------------------  Allergies    IV Contrast (Hives)    Intolerances      Standing Inpatient Medications  albuterol/ipratropium for Nebulization 3 milliLiter(s) Nebulizer every 6 hours  buDESOnide    Inhalation Suspension 0.5 milliGRAM(s) Inhalation every 12 hours  chlorhexidine 2% Cloths 1 Application(s) Topical <User Schedule>  diphenoxylate/atropine 2 Tablet(s) Oral <User Schedule>  enoxaparin Injectable 40 milliGRAM(s) SubCutaneous daily  fat emulsion (Fish Oil and Plant Based) 20% Infusion 20.8 mL/Hr IV Continuous <Continuous>  insulin lispro (HumaLOG) corrective regimen sliding scale   SubCutaneous three times a day before meals  insulin lispro (HumaLOG) corrective regimen sliding scale   SubCutaneous at bedtime  loperamide 16 milliGRAM(s) Oral <User Schedule>  octreotide  Injectable 100 MICROGram(s) SubCutaneous three times a day  opium Tincture 6 milliGRAM(s) Oral <User Schedule>  pantoprazole  Injectable 40 milliGRAM(s) IV Push daily  Parenteral Nutrition - Adult 1 Each TPN Continuous <Continuous>  sodium chloride 0.9%. 1000 milliLiter(s) IV Continuous <Continuous>    PRN Inpatient Medications  ondansetron Injectable 4 milliGRAM(s) IV Push every 6 hours PRN      REVIEW OF SYSTEMS  --------------------------------------------------------------------------------  Gen: as per HPI  Skin: No rashes  Head/Eyes/Ears/Mouth: No headache;No sore throat  Respiratory: No dyspnea, cough,   CV: No chest pain, PND, orthopnea  GI: as per HPI  : No increased frequency, dysuria, hematuria, nocturia  MSK: No joint pain/swelling; no back pain; no edema  Neuro: No dizziness/lightheadedness, weakness, seizures, numbness, tingling  Psych: No significant nervousness, anxiety, stress, depression    All other systems were reviewed and are negative, except as noted.      LABS/STUDIES  --------------------------------------------------------------------------------              8.1    15.19 >-----------<  292      [01-04-20 @ 06:03]              26.5     138  |  102  |  15  ----------------------------<  127      [01-04-20 @ 06:03]  4.2   |  28  |  0.43        Ca     7.5     [01-04-20 @ 06:03]      Mg     1.8     [01-04-20 @ 06:03]      Phos  2.7     [01-04-20 @ 06:03]    TPro  4.9  /  Alb  2.0  /  TBili  0.4  /  DBili  0.2  /  AST  11  /  ALT  11  /  AlkPhos  69  [01-03-20 @ 00:43]            Glucose, Serum: 127 mg/dL (01-04-20 @ 06:03)    PrealbuminPrealbumin, Serum: 16 mg/dL (01-01-20 @ 02:48)  Prealbumin, Serum: 15 mg/dL (12-31-19 @ 03:11)  Prealbumin, Serum: 16 mg/dL (12-30-19 @ 07:53)  Prealbumin, Serum: 13 mg/dL (12-25-19 @ 04:24)  Prealbumin, Serum: 14 mg/dL (12-24-19 @ 02:35)    Triglycerides      01-03-20 @ 07:01  -  01-04-20 @ 07:00  --------------------------------------------------------  IN: 5849.6 mL / OUT: 6000 mL / NET: -150.4 mL    01-04-20 @ 07:01  -  01-04-20 @ 09:29  --------------------------------------------------------  IN: 282.5 mL / OUT: 425 mL / NET: -142.5 mL        VITALS/PHYSICAL EXAM  --------------------------------------------------------------------------------  T(C): 37.2 (01-04-20 @ 07:00), Max: 37.3 (01-03-20 @ 11:00)  HR: 89 (01-04-20 @ 09:00) (79 - 134)  BP: 158/74 (01-04-20 @ 09:00) (99/59 - 166/73)  RR: 24 (01-04-20 @ 09:00) (16 - 36)  SpO2: 96% (01-04-20 @ 09:00) (87% - 99%)  Wt(kg): --      Physical Exam:    Constitutional/Gen: NAD, well-appearing  HEENT: PERRL,  Neck: trach midline  Chest: non labored breathing, equal chest expansion b/l. left sided chest tube in place with serosanguineous drainage   Abd: soft, nontender, nondistended, ostomy pink with stool in bag    Ext: moving all 4 limbs spontaneous, WWP no edema  .  .  .

## 2020-01-04 NOTE — PROGRESS NOTE ADULT - ASSESSMENT
73 y/o M presenting with septic shock and high grade SBO s/p exploratory laparotomy, lysis of adhesions, decompression of bowel via enterotomy w/ primary repair, and Abthera VAC placement on 12/6; s/p take down of Abthera, washout and re-application of the Abthera vac on 12/8. Now s/p SBR and end ileostomy on 12/10 acute respiratory distress now improving, Pneumothorax, hyperglycemia, delirium. Now still with persistent pneumothorax when chest pigtail clamped.   12/23 VSS on room air recommending IR drainage of left chest  12/24 No evidence of air leak to left chest tube. Tube clamped. Repeat chest cxr in 4 hours. Anticipate chest tube removal if lung remains expanded. Drainage of loculated left effusion discussed with IR. IR to reconsult for drainage once initial tube removed. Discussed plan with Dr Mayes and IR Fellow  12/24  On NC ,    VSSchest xray sm lt pl eff, left chest tube dc'd.  12/26    2l NC   co  sob,  lt side diminshed  12/27 VSS, CXR with unchanged loculated left pleural effusion- IR consulted for pigtail placement, states effusion can be drained via thoracentesis, pigtail placement not indicated at this time - Dr. Mayes to speak with IR attending.   12/30 Patient with persistent PTX  and left pleural effusion now for IR drainage with Dr Elkins.   12/31 HD stable, L PTC w/ high output, serosanguinous drainage, 990 ml overnight/ 2200 in 24 hrs. Tolerating 2L NC supplemental O2, Cyto pending, continue monitor drainage output.   1/1: LPTC still with High output-400/24h. Continue with pigtail cath drain.  1/ 2     lt pigtail draining  on lws  1/3 HD stable, left pigtail catheter continues to drain. Incentive spirometer encouraged, OOB and mobility, continue pulmonary toileting. Maintain chest tube to suction and monitor output.   1/4: Chest tube milked to prevent clogging of tube, Continues to drain with high out put.

## 2020-01-04 NOTE — PROGRESS NOTE ADULT - ASSESSMENT
74 year old male w/ PMH of COPD and EtOH dependence with PSH/o appy, prostate surgery, & spine surgery  septic shock and high grade SBO s/p exploratory laparotomy, lysis of adhesions, decompression of bowel via enterotomy w/ primary repair, and Abthera VAC placement on 12/6; s/p take down of Abthera, washout and re-application of the Abthera vac on 12/8. Now s/p SBR and end ileostomy on 12/10.   TPN consulted to assist w/ management of pt's nutrition in pt w/ prolonged hospital course now tolerating diet but has high Ileostomy output     Continue TPN at full strength in pt w/ severe Protein-Calorie Malnutrition      -  high ostomy output- pt eating more regularly- continue to monitor      - Tolerating Dysphagia 1 Pureed-Nectar Consistency Fluid       - Plan to continue TPN & octreotide, lomotil, tincture of opium, & imodium  HypoNa  improving & will continue to monitor-maintaining K levels will help improve Na  Elevated K -improving, will keep KCl of 40 mEq KCl in TPN          possibly due to Octreotide  HypoPhos resolving, continue NaPhos of 45mMol   Strict Intake and Output-       high ileostomy output - greatly improved, continue to monitor as pt eating more consistently        pt now on increased lomotil, imodium, tincture of opium, & Octreotide   f/u fecal fat testing, 72hr fecal studies to see what pt is absorbing        to assess ongoing needs in pt with concerns for Short Gut Syndrome as pt has high ostomy output  Hyperglycemia - improving - Insulin removed from TPN      Fingersticks & ISS coverage - to be ordered by primary team  Ancef course for cellulitis of incision - continue to monitor improvement  IR placed pigtail Lt chest effusion- still high drainage, continue to monitor as per SICU/ IR/ CTS  PICC w/dedicated port for only TPN - maintenance as per protocol  Weights three times a week  Monitor BMP, Mg, Ionized Ca, Phosphorus daily  Continue to monitor Triglycerides and Pre-albumin weekly  Continue as per SICU / Surgery, will follow with you, D/w primary team      TPN team, pager [phone]  D/w Ana M Siegel 74 year old male w/ PMH of COPD and EtOH dependence with PSH/o appy, prostate surgery, & spine surgery  septic shock and high grade SBO s/p exploratory laparotomy, lysis of adhesions, decompression of bowel via enterotomy w/ primary repair, and Abthera VAC placement on 12/6; s/p take down of Abthera, washout and re-application of the Abthera vac on 12/8. Now s/p SBR and end ileostomy on 12/10.   TPN consulted to assist w/ management of pt's nutrition in pt w/ prolonged hospital course now tolerating diet but has high Ileostomy output     - Continue TPN at full strength in pt w/ severe Protein-Calorie Malnutrition. Plan to continue TPN.  - HypoNa  improving & will continue to monitor-maintaining K levels will help improve Na  - Elevated K -improving, will keep KCl of 40 mEq KCl in TPN, possibly due to Octreotide  - HypoPhos resolving, continue NaPhos of 45mMol   - Strict Intake and Output- high ileostomy output - greatly improved, continue to monitor as pt eating more consistently. Pt on increased lomotil, imodium, tincture of opium, & Octreotide   - f/u fecal fat testing, 72hr fecal studies to see what pt is absorbing to assess ongoing needs in pt with concerns for Short Gut Syndrome in setting of high ostomy output  - Hyperglycemia - improving - Insulin removed from TPN, FS & ISS coverage - to be ordered by primary team  - Ancef course for cellulitis of incision - continue to monitor improvement  - IR placed pigtail Lt chest effusion- still high drainage, continue to monitor as per SICU/ IR/ CTS  - PICC w/dedicated port for only TPN - maintenance as per protocol  - Weights three times a week  - Monitor BMP, Mg, Ionized Ca, Phosphorus daily  - Continue to monitor Triglycerides and Pre-albumin weekly  - Continue as per SICU / Surgery, will follow with you, D/w primary team      TPN team, pager [phone]  D/w Ana M Siegel

## 2020-01-04 NOTE — PROGRESS NOTE ADULT - ASSESSMENT
73 y/o M presenting with septic shock and high grade SBO s/p exploratory laparotomy, lysis of adhesions, decompression of bowel via enterotomy w/ primary repair, and Abthera VAC placement on 12/6; s/p take down of Abthera, washout and re-application of the Abthera vac on 12/8. Now s/p SBR and end ileostomy on 12/10 acute respiratory distress now improving, Pneumothorax, hyperglycemia, delirium.    PLAN:    Neuro: A&Ox3   - Tylenol if needed for pain    Resp: acute respiratory failure upon initial presentation, COPD at baseline, spontaneous left sided pneumothorax s/p pig-tail catheter placement.   - Continue to monitor on RA  - Continue Duonebs, budesonide  - Continue left sided pig-tail to suction   - Appreciate thoracic input  - OOB and PT as tolerated    CV: septic shock requiring vasopressor support, now with intermittent hypotension   - Continuing to hold metoprolol in setting of hypotension     GI: SBO s/p exploratory laparotomy, lysis of adhesions, decompression of bowel via enterotomy w/ primary repair, and Abthera VAC placement and removal with 150 cm of small bowel removed and end ileostomy.   - dysphagia 1 diet with TPN (calorie count)  - MBS prior to d/c as per S+S  - Monitor ostomy output, lomotil 2 tabs q6hrs & Imodium 16 mg q6hrs & tincture of opium 6 mg q6hrs, octreotide 100mcg SQ TID  - Continue 1:1 repletions with ostomy   - Protonix 40 daily    Renal: CHI, resolved   - Replete electrolytes as needed  - Monitor I/Os    Heme: no acute issues  - Lovenox for VTE prophylaxis  - SCDs     ID: No active issues   - Trend WBC, monitor fever curve    Endo: no acute issues  - SSI, q6 fingersticks    Dispo: SICU full code

## 2020-01-04 NOTE — PROGRESS NOTE ADULT - ATTENDING COMMENTS
Patient seen and examined on 1/4/20 AM SICU rounds  awake, alert  hemodynamically stable  oxygenating well  Still with high output ileostomy (averaging greater than 3L daily) requiring fluid replacements every 4-6 hours to avoid hypovolemia  Maximized on antiperistaltic agents

## 2020-01-04 NOTE — PROGRESS NOTE ADULT - PROBLEM SELECTOR PLAN 1
sp  IR placed lt pigtail  keep lws  daily chest xray while pl tube in place  continue monitor chest tube output  incentive spx  pulmonary toileting  oob and mobilize  analgesia for back pain  will follow

## 2020-01-04 NOTE — PROGRESS NOTE ADULT - ATTENDING COMMENTS
pt seen and examined.  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

## 2020-01-04 NOTE — PROGRESS NOTE ADULT - SUBJECTIVE AND OBJECTIVE BOX
Surgery Progress Note  Patient is a 74y old  Male who presents with a chief complaint of abd. pain (04 Jan 2020 09:29)      SUBJECTIVE: Patient seen and examined at bedside with surgical team.  OOB and sitting in a chair.   Breathing still at baseline per patient. Tolerated dysphasia diet.   Denies nausea, vomiting, abd abdominal pain.   Continues to have high ostomy op 3350/24hrs     24 HOUR EVENTS:  - Calorie count initiated   - No acute events    Vital Signs Last 24 Hrs  T(C): 37.2 (04 Jan 2020 07:00), Max: 37.3 (03 Jan 2020 11:00)  T(F): 99 (04 Jan 2020 07:00), Max: 99.1 (03 Jan 2020 11:00)  HR: 89 (04 Jan 2020 09:00) (79 - 134)  BP: 158/74 (04 Jan 2020 09:00) (99/59 - 166/73)  BP(mean): 106 (04 Jan 2020 09:00) (74 - 113)  RR: 24 (04 Jan 2020 09:00) (16 - 36)  SpO2: 96% (04 Jan 2020 09:00) (87% - 99%)    Physical Exam  Constitutional: NAD  Respiratory: breathing comfortably on RA  Chest: left sided chest tube in place with serosanguineous drainage   Abd: soft, nontender, nondistended, ostomy pink with stool in bag    Ext: moving all 4 limbs spontaneous     I&O's Detail    03 Jan 2020 07:01  -  04 Jan 2020 07:00  --------------------------------------------------------  IN:    fat emulsion (Fish Oil and Plant Based) 20% Infusion: 249.6 mL    Oral Fluid: 240 mL    sodium chloride 0.9%: 210 mL    Solution: 3350 mL    TPN (Total Parenteral Nutrition): 1800 mL  Total IN: 5849.6 mL    OUT:    Chest Tube: 400 mL    Ileostomy: 3350 mL    Voided: 2250 mL  Total OUT: 6000 mL    Total NET: -150.4 mL      04 Jan 2020 07:01  -  04 Jan 2020 10:07  --------------------------------------------------------  IN:    sodium chloride 0.9%: 20 mL    Solution: 112.5 mL    TPN (Total Parenteral Nutrition): 150 mL  Total IN: 282.5 mL    OUT:    Voided: 425 mL  Total OUT: 425 mL    Total NET: -142.5 mL      MEDICATIONS  (STANDING):  albuterol/ipratropium for Nebulization 3 milliLiter(s) Nebulizer every 6 hours  buDESOnide    Inhalation Suspension 0.5 milliGRAM(s) Inhalation every 12 hours  chlorhexidine 2% Cloths 1 Application(s) Topical <User Schedule>  diphenoxylate/atropine 2 Tablet(s) Oral <User Schedule>  enoxaparin Injectable 40 milliGRAM(s) SubCutaneous daily  insulin lispro (HumaLOG) corrective regimen sliding scale   SubCutaneous three times a day before meals  insulin lispro (HumaLOG) corrective regimen sliding scale   SubCutaneous at bedtime  loperamide 16 milliGRAM(s) Oral <User Schedule>  octreotide  Injectable 100 MICROGram(s) SubCutaneous three times a day  opium Tincture 6 milliGRAM(s) Oral <User Schedule>  pantoprazole  Injectable 40 milliGRAM(s) IV Push daily  Parenteral Nutrition - Adult 1 Each (75 mL/Hr) TPN Continuous <Continuous>  sodium chloride 0.9%. 1000 milliLiter(s) (10 mL/Hr) IV Continuous <Continuous>    MEDICATIONS  (PRN):  ondansetron Injectable 4 milliGRAM(s) IV Push every 6 hours PRN Nausea and/or Vomiting      LABS:                        8.1    15.19 )-----------( 292      ( 04 Jan 2020 06:03 )             26.5     01-04    138  |  102  |  15  ----------------------------<  127<H>  4.2   |  28  |  0.43<L>    Ca    7.5<L>      04 Jan 2020 06:03  Phos  2.7     01-04  Mg     1.8     01-04    TPro  4.9<L>  /  Alb  2.0<L>  /  TBili  0.4  /  DBili  0.2  /  AST  11  /  ALT  11  /  AlkPhos  69  01-03      LIVER FUNCTIONS - ( 03 Jan 2020 00:43 )  Alb: 2.0 g/dL / Pro: 4.9 g/dL / ALK PHOS: 69 U/L / ALT: 11 U/L / AST: 11 U/L / GGT: x

## 2020-01-04 NOTE — PROGRESS NOTE ADULT - SUBJECTIVE AND OBJECTIVE BOX
SICU DAILY PROGRESS NOTE    74y Male presented with abdominal pain, nausea, hematemesis, and poor PO intake for ~2-3 days. Labs significant for an CHI w/ Cr 2.74 and lactate of 9.4. He was also notably tachycardic to the 110s and hypotensive w/ SBP in the 70s. He was given 2 units of PRBCs and 2 L of LR in the ED due to concern for upper GI bleeding. Imaging revealed high grade SBO in the RLQ. Patient was taken to the OR emergently for an exploratory laparotomy, lysis of adhesions, decompression of bowel via enterotomy w/ primary repair, and Abthera VAC placement. Of note, the distal 50% of the bowel appeared dusky but viable. He required vasopressor support with phenylephrine and vasopressin infusions. He received 3000 mL of crystalloid w/ EBL of 10 mL and UOP of 25 mL. Patient was left intubated at the end of the case so SICU was consulted for hemodynamic monitoring. Taken back to the OR on 12/8 and underwent take down of Abthera, washout and re-application of the Abthera vac. Went back to OR on 12/10 night for colectomy. Post-op course complicated by short gut syndrome      24 HOUR EVENTS:  - Calorie count initiated   - No acute events    SUBJECTIVE/ROS:  [x] A ten-point review of systems was otherwise negative except as noted.  [ ] Due to altered mental status/intubation, subjective information were not able to be obtained from the patient. History was obtained, to the extent possible, from review of the chart and collateral sources of information.    NEURO  Exam: awake, alert, oriented  Meds: ondansetron Injectable 4 milliGRAM(s) IV Push every 6 hours PRN Nausea and/or Vomiting  opium Tincture 6 milliGRAM(s) Oral <User Schedule>    [x] Adequacy of sedation and pain control has been assessed and adjusted      RESPIRATORY  RR: 31 (01-04-20 @ 00:00) (14 - 36)  SpO2: 92% (01-04-20 @ 00:18) (87% - 100%)  Wt(kg): --  Exam: unlabored, clear to auscultation bilaterally, L chest tube in place to low wall suction      Meds: albuterol/ipratropium for Nebulization 3 milliLiter(s) Nebulizer every 6 hours  buDESOnide    Inhalation Suspension 0.5 milliGRAM(s) Inhalation every 12 hours        CARDIOVASCULAR  HR: 93 (01-04-20 @ 00:18) (79 - 134)  BP: 166/73 (01-04-20 @ 00:00) (98/53 - 166/73)  BP(mean): 105 (01-04-20 @ 00:00) (70 - 105)  ABP: --  ABP(mean): --  Wt(kg): --  CVP(cm H2O): --      Exam: regular rate and rhythm  Cardiac Rhythm: sinus  Perfusion     [x]Adequate   [ ]Inadequate  Mentation   [x]Normal       [ ]Reduced  Extremities  [x]Warm         [ ]Cool  Volume Status [ ]Hypervolemic [x]Euvolemic [ ]Hypovolemic  Meds:       GI/NUTRITION  Exam: soft, nontender, nondistended, incision C/D/I, ostomy pink and viable with output  Diet: Regular - Dysphagia 1  Meds: diphenoxylate/atropine 2 Tablet(s) Oral <User Schedule>  loperamide 16 milliGRAM(s) Oral <User Schedule>  pantoprazole  Injectable 40 milliGRAM(s) IV Push daily      GENITOURINARY  I&O's Detail    01-02 @ 07:01  -  01-03 @ 07:00  --------------------------------------------------------  IN:    fat emulsion (Fish Oil and Plant Based) 20% Infusion: 249.6 mL    Oral Fluid: 380 mL    sodium chloride 0.9%.: 180 mL    Solution: 3500 mL    Solution: 350 mL    TPN (Total Parenteral Nutrition): 1725 mL  Total IN: 6384.6 mL    OUT:    Chest Tube: 800 mL    Ileostomy: 3500 mL    Voided: 1500 mL  Total OUT: 5800 mL    Total NET: 584.6 mL      01-03 @ 07:01  -  01-04 @ 01:49  --------------------------------------------------------  IN:    fat emulsion (Fish Oil and Plant Based) 20% Infusion: 166.4 mL    Oral Fluid: 240 mL    sodium chloride 0.9%.: 140 mL    Solution: 2650 mL    TPN (Total Parenteral Nutrition): 1275 mL  Total IN: 4471.4 mL    OUT:    Chest Tube: 350 mL    Ileostomy: 2650 mL    Voided: 1550 mL  Total OUT: 4550 mL    Total NET: -78.6 mL          01-03    138  |  104  |  16  ----------------------------<  139<H>  4.1   |  26  |  0.45<L>    Ca    7.1<L>      03 Jan 2020 00:43  Phos  2.7     01-03  Mg     1.9     01-03    TPro  4.9<L>  /  Alb  2.0<L>  /  TBili  0.4  /  DBili  0.2  /  AST  11  /  ALT  11  /  AlkPhos  69  01-03    [ ] Martinez catheter, indication: N/A  Meds: fat emulsion (Fish Oil and Plant Based) 20% Infusion 20.8 mL/Hr IV Continuous <Continuous>  Parenteral Nutrition - Adult 1 Each TPN Continuous <Continuous>  sodium chloride 0.9%. 1000 milliLiter(s) IV Continuous <Continuous>        HEMATOLOGIC  Meds: enoxaparin Injectable 40 milliGRAM(s) SubCutaneous daily    [x] VTE Prophylaxis - Lovenox                        7.7    14.50 )-----------( 283      ( 03 Jan 2020 00:40 )             25.5       Transfusion     [ ] PRBC   [ ] Platelets   [ ] FFP   [ ] Cryoprecipitate      INFECTIOUS DISEASES    RECENT CULTURES:  Specimen Source: .Blood Blood  Date/Time: 12-30 @ 22:09  Culture Results:   No growth to date.  Gram Stain: --  Organism: --  Specimen Source: .Body Fluid Pleural Fluid  Date/Time: 12-30 @ 19:16  Culture Results:   No growth  Gram Stain:   No polymorphonuclear cells seen  No organisms seen  by cytocentrifuge  Organism: --    Meds:       ENDOCRINE  CAPILLARY BLOOD GLUCOSE      POCT Blood Glucose.: 134 mg/dL (03 Jan 2020 22:47)  POCT Blood Glucose.: 117 mg/dL (03 Jan 2020 17:00)  POCT Blood Glucose.: 120 mg/dL (03 Jan 2020 11:07)  POCT Blood Glucose.: 130 mg/dL (03 Jan 2020 07:59)    Meds: insulin lispro (HumaLOG) corrective regimen sliding scale   SubCutaneous three times a day before meals  insulin lispro (HumaLOG) corrective regimen sliding scale   SubCutaneous at bedtime  octreotide  Injectable 100 MICROGram(s) SubCutaneous three times a day        ACCESS DEVICES:  [x] Peripheral IV  [ ] Central Venous Line	[ ] R	[ ] L	[ ] IJ	[ ] Fem	[ ] SC	Placed:   [ ] Arterial Line		[ ] R	[ ] L	[ ] Fem	[ ] Rad	[ ] Ax	Placed:   [ ] PICC:					[ ] Mediport  [ ] Urinary Catheter, Date Placed:   [x] Necessity of urinary, arterial, and venous catheters discussed    OTHER MEDICATIONS:  chlorhexidine 2% Cloths 1 Application(s) Topical <User Schedule>      CODE STATUS:      IMAGING: SICU DAILY PROGRESS NOTE    75 y/o male with a past medical history of COPD and EtOH dependence who presented on 12/6/2019 with abdominal pain, nausea, hematemesis, and poor PO intake for ~2-3 days. In the ED, he was found to be hypotensive & tachycardic. Labs revealed an CHI and lactic acidosis. Imaging revealed a high grade SBO in the RLQ on CT scan. Patient was taken to the OR emergently for an exploratory laparotomy, lysis of adhesions, decompression of bowel via enterotomy w/ primary repair, and Abthera VAC placement. The distal 50% of the bowel appeared dusky but viable at that time. He was requiring vasopressor support and was left intubated at the end of the case so patient was admitted to SICU post-operatively. He was taken back to the OR on 12/8/2019 for re-exploration. The proximal 165 cm of small bowel appeared pink & viable but beyond that had patchy areas of ischemia. Decision was made to give the bowel more time to demarcate before resecting in order to preserve as much small bowel as possible so Abthera VAC was replaced. He returned to the OR on 12/10/2019 for re-exploration, small bowel resection of 150 cm (150 cm remaining), ileocecetomy, end ileostomy, mucous fistula, and abdominal closure. He was extubated on 12/11/2019. Hospital course has been complicated by SVT, short bowel syndrome requiring repletions w/ IV fluids, malnutrition requiring TPN, intermittent episodes of hypotension, spontaneous left pneumothorax s/p pigtail catheter (12/15-12/24), left pleural effusion s/p pigtail catheter w/ IR (12/30), dysphagia, and oral HSV lesions.    24 HOUR EVENTS:  - Calorie count initiated   - No acute events    SUBJECTIVE/ROS:  [x] A ten-point review of systems was otherwise negative except as noted.  [ ] Due to altered mental status/intubation, subjective information were not able to be obtained from the patient. History was obtained, to the extent possible, from review of the chart and collateral sources of information.    NEURO  Exam: awake, alert, oriented  Meds: ondansetron Injectable 4 milliGRAM(s) IV Push every 6 hours PRN Nausea and/or Vomiting  opium Tincture 6 milliGRAM(s) Oral <User Schedule>    [x] Adequacy of sedation and pain control has been assessed and adjusted      RESPIRATORY  RR: 31 (01-04-20 @ 00:00) (14 - 36)  SpO2: 92% (01-04-20 @ 00:18) (87% - 100%)  Wt(kg): --  Exam: unlabored, clear to auscultation bilaterally, L chest tube in place to low wall suction      Meds: albuterol/ipratropium for Nebulization 3 milliLiter(s) Nebulizer every 6 hours  buDESOnide    Inhalation Suspension 0.5 milliGRAM(s) Inhalation every 12 hours        CARDIOVASCULAR  HR: 93 (01-04-20 @ 00:18) (79 - 134)  BP: 166/73 (01-04-20 @ 00:00) (98/53 - 166/73)  BP(mean): 105 (01-04-20 @ 00:00) (70 - 105)  ABP: --  ABP(mean): --  Wt(kg): --  CVP(cm H2O): --      Exam: regular rate and rhythm  Cardiac Rhythm: sinus  Perfusion     [x]Adequate   [ ]Inadequate  Mentation   [x]Normal       [ ]Reduced  Extremities  [x]Warm         [ ]Cool  Volume Status [ ]Hypervolemic [x]Euvolemic [ ]Hypovolemic  Meds:       GI/NUTRITION  Exam: soft, nontender, nondistended, incision C/D/I, ostomy pink and viable with output  Diet: Regular - Dysphagia 1  Meds: diphenoxylate/atropine 2 Tablet(s) Oral <User Schedule>  loperamide 16 milliGRAM(s) Oral <User Schedule>  pantoprazole  Injectable 40 milliGRAM(s) IV Push daily      GENITOURINARY  I&O's Detail    01-02 @ 07:01  -  01-03 @ 07:00  --------------------------------------------------------  IN:    fat emulsion (Fish Oil and Plant Based) 20% Infusion: 249.6 mL    Oral Fluid: 380 mL    sodium chloride 0.9%.: 180 mL    Solution: 3500 mL    Solution: 350 mL    TPN (Total Parenteral Nutrition): 1725 mL  Total IN: 6384.6 mL    OUT:    Chest Tube: 800 mL    Ileostomy: 3500 mL    Voided: 1500 mL  Total OUT: 5800 mL    Total NET: 584.6 mL      01-03 @ 07:01  -  01-04 @ 01:49  --------------------------------------------------------  IN:    fat emulsion (Fish Oil and Plant Based) 20% Infusion: 166.4 mL    Oral Fluid: 240 mL    sodium chloride 0.9%.: 140 mL    Solution: 2650 mL    TPN (Total Parenteral Nutrition): 1275 mL  Total IN: 4471.4 mL    OUT:    Chest Tube: 350 mL    Ileostomy: 2650 mL    Voided: 1550 mL  Total OUT: 4550 mL    Total NET: -78.6 mL          01-03    138  |  104  |  16  ----------------------------<  139<H>  4.1   |  26  |  0.45<L>    Ca    7.1<L>      03 Jan 2020 00:43  Phos  2.7     01-03  Mg     1.9     01-03    TPro  4.9<L>  /  Alb  2.0<L>  /  TBili  0.4  /  DBili  0.2  /  AST  11  /  ALT  11  /  AlkPhos  69  01-03    [ ] Martinez catheter, indication: N/A  Meds: fat emulsion (Fish Oil and Plant Based) 20% Infusion 20.8 mL/Hr IV Continuous <Continuous>  Parenteral Nutrition - Adult 1 Each TPN Continuous <Continuous>  sodium chloride 0.9%. 1000 milliLiter(s) IV Continuous <Continuous>        HEMATOLOGIC  Meds: enoxaparin Injectable 40 milliGRAM(s) SubCutaneous daily    [x] VTE Prophylaxis - Lovenox                        7.7    14.50 )-----------( 283      ( 03 Jan 2020 00:40 )             25.5       Transfusion     [ ] PRBC   [ ] Platelets   [ ] FFP   [ ] Cryoprecipitate      INFECTIOUS DISEASES    RECENT CULTURES:  Specimen Source: .Blood Blood  Date/Time: 12-30 @ 22:09  Culture Results:   No growth to date.  Gram Stain: --  Organism: --  Specimen Source: .Body Fluid Pleural Fluid  Date/Time: 12-30 @ 19:16  Culture Results:   No growth  Gram Stain:   No polymorphonuclear cells seen  No organisms seen  by cytocentrifuge  Organism: --    Meds:       ENDOCRINE  CAPILLARY BLOOD GLUCOSE      POCT Blood Glucose.: 134 mg/dL (03 Jan 2020 22:47)  POCT Blood Glucose.: 117 mg/dL (03 Jan 2020 17:00)  POCT Blood Glucose.: 120 mg/dL (03 Jan 2020 11:07)  POCT Blood Glucose.: 130 mg/dL (03 Jan 2020 07:59)    Meds: insulin lispro (HumaLOG) corrective regimen sliding scale   SubCutaneous three times a day before meals  insulin lispro (HumaLOG) corrective regimen sliding scale   SubCutaneous at bedtime  octreotide  Injectable 100 MICROGram(s) SubCutaneous three times a day        ACCESS DEVICES:  [x] Peripheral IV  [ ] Central Venous Line	[ ] R	[ ] L	[ ] IJ	[ ] Fem	[ ] SC	Placed:   [ ] Arterial Line		[ ] R	[ ] L	[ ] Fem	[ ] Rad	[ ] Ax	Placed:   [ ] PICC:					[ ] Mediport  [ ] Urinary Catheter, Date Placed:   [x] Necessity of urinary, arterial, and venous catheters discussed    OTHER MEDICATIONS:  chlorhexidine 2% Cloths 1 Application(s) Topical <User Schedule>      CODE STATUS:      IMAGING:

## 2020-01-05 LAB
ALBUMIN SERPL ELPH-MCNC: 2.2 G/DL — LOW (ref 3.3–5)
ALP SERPL-CCNC: 72 U/L — SIGNIFICANT CHANGE UP (ref 40–120)
ALT FLD-CCNC: 12 U/L — SIGNIFICANT CHANGE UP (ref 10–45)
ANION GAP SERPL CALC-SCNC: 7 MMOL/L — SIGNIFICANT CHANGE UP (ref 5–17)
AST SERPL-CCNC: 14 U/L — SIGNIFICANT CHANGE UP (ref 10–40)
BILIRUB DIRECT SERPL-MCNC: 0.3 MG/DL — HIGH (ref 0–0.2)
BILIRUB INDIRECT FLD-MCNC: 0.2 MG/DL — SIGNIFICANT CHANGE UP (ref 0.2–1)
BILIRUB SERPL-MCNC: 0.5 MG/DL — SIGNIFICANT CHANGE UP (ref 0.2–1.2)
BUN SERPL-MCNC: 14 MG/DL — SIGNIFICANT CHANGE UP (ref 7–23)
CALCIUM SERPL-MCNC: 7.5 MG/DL — LOW (ref 8.4–10.5)
CHLORIDE SERPL-SCNC: 101 MMOL/L — SIGNIFICANT CHANGE UP (ref 96–108)
CO2 SERPL-SCNC: 29 MMOL/L — SIGNIFICANT CHANGE UP (ref 22–31)
CREAT SERPL-MCNC: 0.41 MG/DL — LOW (ref 0.5–1.3)
GLUCOSE BLDC GLUCOMTR-MCNC: 120 MG/DL — HIGH (ref 70–99)
GLUCOSE BLDC GLUCOMTR-MCNC: 122 MG/DL — HIGH (ref 70–99)
GLUCOSE BLDC GLUCOMTR-MCNC: 128 MG/DL — HIGH (ref 70–99)
GLUCOSE BLDC GLUCOMTR-MCNC: 131 MG/DL — HIGH (ref 70–99)
GLUCOSE SERPL-MCNC: 142 MG/DL — HIGH (ref 70–99)
HCT VFR BLD CALC: 25.1 % — LOW (ref 39–50)
HGB BLD-MCNC: 7.7 G/DL — LOW (ref 13–17)
MAGNESIUM SERPL-MCNC: 1.8 MG/DL — SIGNIFICANT CHANGE UP (ref 1.6–2.6)
MCHC RBC-ENTMCNC: 29.6 PG — SIGNIFICANT CHANGE UP (ref 27–34)
MCHC RBC-ENTMCNC: 30.7 GM/DL — LOW (ref 32–36)
MCV RBC AUTO: 96.5 FL — SIGNIFICANT CHANGE UP (ref 80–100)
NRBC # BLD: 0 /100 WBCS — SIGNIFICANT CHANGE UP (ref 0–0)
PHOSPHATE SERPL-MCNC: 2.7 MG/DL — SIGNIFICANT CHANGE UP (ref 2.5–4.5)
PLATELET # BLD AUTO: 251 K/UL — SIGNIFICANT CHANGE UP (ref 150–400)
POTASSIUM SERPL-MCNC: 3.8 MMOL/L — SIGNIFICANT CHANGE UP (ref 3.5–5.3)
POTASSIUM SERPL-SCNC: 3.8 MMOL/L — SIGNIFICANT CHANGE UP (ref 3.5–5.3)
PROT SERPL-MCNC: 5.1 G/DL — LOW (ref 6–8.3)
RBC # BLD: 2.6 M/UL — LOW (ref 4.2–5.8)
RBC # FLD: 15.3 % — HIGH (ref 10.3–14.5)
SODIUM SERPL-SCNC: 137 MMOL/L — SIGNIFICANT CHANGE UP (ref 135–145)
WBC # BLD: 13.37 K/UL — HIGH (ref 3.8–10.5)
WBC # FLD AUTO: 13.37 K/UL — HIGH (ref 3.8–10.5)

## 2020-01-05 PROCEDURE — 99232 SBSQ HOSP IP/OBS MODERATE 35: CPT

## 2020-01-05 PROCEDURE — 71045 X-RAY EXAM CHEST 1 VIEW: CPT | Mod: 26

## 2020-01-05 RX ORDER — POTASSIUM PHOSPHATE, MONOBASIC POTASSIUM PHOSPHATE, DIBASIC 236; 224 MG/ML; MG/ML
15 INJECTION, SOLUTION INTRAVENOUS ONCE
Refills: 0 | Status: COMPLETED | OUTPATIENT
Start: 2020-01-05 | End: 2020-01-05

## 2020-01-05 RX ORDER — POTASSIUM CHLORIDE 20 MEQ
20 PACKET (EA) ORAL ONCE
Refills: 0 | Status: COMPLETED | OUTPATIENT
Start: 2020-01-05 | End: 2020-01-05

## 2020-01-05 RX ORDER — I.V. FAT EMULSION 20 G/100ML
20.8 EMULSION INTRAVENOUS
Qty: 50 | Refills: 0 | Status: DISCONTINUED | OUTPATIENT
Start: 2020-01-05 | End: 2020-01-06

## 2020-01-05 RX ORDER — SODIUM CHLORIDE 9 MG/ML
1000 INJECTION INTRAMUSCULAR; INTRAVENOUS; SUBCUTANEOUS
Refills: 0 | Status: DISCONTINUED | OUTPATIENT
Start: 2020-01-05 | End: 2020-01-10

## 2020-01-05 RX ORDER — MORPHINE 10 MG/ML
6 SOLUTION ORAL
Refills: 0 | Status: DISCONTINUED | OUTPATIENT
Start: 2020-01-05 | End: 2020-01-10

## 2020-01-05 RX ORDER — ELECTROLYTE SOLUTION,INJ
1 VIAL (ML) INTRAVENOUS
Refills: 0 | Status: DISCONTINUED | OUTPATIENT
Start: 2020-01-05 | End: 2020-01-05

## 2020-01-05 RX ORDER — MAGNESIUM SULFATE 500 MG/ML
2 VIAL (ML) INJECTION ONCE
Refills: 0 | Status: COMPLETED | OUTPATIENT
Start: 2020-01-05 | End: 2020-01-05

## 2020-01-05 RX ORDER — DIPHENOXYLATE HCL/ATROPINE 2.5-.025MG
2 TABLET ORAL
Refills: 0 | Status: DISCONTINUED | OUTPATIENT
Start: 2020-01-05 | End: 2020-01-10

## 2020-01-05 RX ADMIN — PANTOPRAZOLE SODIUM 40 MILLIGRAM(S): 20 TABLET, DELAYED RELEASE ORAL at 11:35

## 2020-01-05 RX ADMIN — Medication 16 MILLIGRAM(S): at 12:57

## 2020-01-05 RX ADMIN — MORPHINE 6 MILLIGRAM(S): 10 SOLUTION ORAL at 08:01

## 2020-01-05 RX ADMIN — Medication 20 MILLIEQUIVALENT(S): at 02:33

## 2020-01-05 RX ADMIN — Medication 0.5 MILLIGRAM(S): at 17:44

## 2020-01-05 RX ADMIN — Medication 1 EACH: at 22:02

## 2020-01-05 RX ADMIN — Medication 2 TABLET(S): at 22:03

## 2020-01-05 RX ADMIN — Medication 16 MILLIGRAM(S): at 22:03

## 2020-01-05 RX ADMIN — MORPHINE 6 MILLIGRAM(S): 10 SOLUTION ORAL at 22:03

## 2020-01-05 RX ADMIN — ENOXAPARIN SODIUM 40 MILLIGRAM(S): 100 INJECTION SUBCUTANEOUS at 11:35

## 2020-01-05 RX ADMIN — OCTREOTIDE ACETATE 100 MICROGRAM(S): 200 INJECTION, SOLUTION INTRAVENOUS; SUBCUTANEOUS at 22:03

## 2020-01-05 RX ADMIN — Medication 3 MILLILITER(S): at 11:16

## 2020-01-05 RX ADMIN — I.V. FAT EMULSION 20.8 ML/HR: 20 EMULSION INTRAVENOUS at 16:30

## 2020-01-05 RX ADMIN — Medication 16 MILLIGRAM(S): at 08:01

## 2020-01-05 RX ADMIN — Medication 2 TABLET(S): at 17:17

## 2020-01-05 RX ADMIN — Medication 50 GRAM(S): at 02:34

## 2020-01-05 RX ADMIN — CHLORHEXIDINE GLUCONATE 1 APPLICATION(S): 213 SOLUTION TOPICAL at 06:21

## 2020-01-05 RX ADMIN — MORPHINE 6 MILLIGRAM(S): 10 SOLUTION ORAL at 12:57

## 2020-01-05 RX ADMIN — Medication 16 MILLIGRAM(S): at 17:18

## 2020-01-05 RX ADMIN — Medication 3 MILLILITER(S): at 17:44

## 2020-01-05 RX ADMIN — OCTREOTIDE ACETATE 100 MICROGRAM(S): 200 INJECTION, SOLUTION INTRAVENOUS; SUBCUTANEOUS at 06:21

## 2020-01-05 RX ADMIN — OCTREOTIDE ACETATE 100 MICROGRAM(S): 200 INJECTION, SOLUTION INTRAVENOUS; SUBCUTANEOUS at 14:10

## 2020-01-05 RX ADMIN — SODIUM CHLORIDE 10 MILLILITER(S): 9 INJECTION INTRAMUSCULAR; INTRAVENOUS; SUBCUTANEOUS at 06:21

## 2020-01-05 RX ADMIN — Medication 2 TABLET(S): at 08:01

## 2020-01-05 RX ADMIN — MORPHINE 6 MILLIGRAM(S): 10 SOLUTION ORAL at 17:17

## 2020-01-05 RX ADMIN — POTASSIUM PHOSPHATE, MONOBASIC POTASSIUM PHOSPHATE, DIBASIC 62.5 MILLIMOLE(S): 236; 224 INJECTION, SOLUTION INTRAVENOUS at 02:33

## 2020-01-05 RX ADMIN — Medication 2 TABLET(S): at 12:57

## 2020-01-05 RX ADMIN — Medication 1 EACH: at 16:30

## 2020-01-05 NOTE — PROGRESS NOTE ADULT - ATTENDING COMMENTS
Patient seen and examined on 1/5/20 AM SICU rounds  Still with high ileostomy outputs despite antiperistaltics  High ileostomy output will likely require reversal of ileostomy earlier then previously thought  Care discussed with Dr. GISELE Siegel

## 2020-01-05 NOTE — PROGRESS NOTE ADULT - SUBJECTIVE AND OBJECTIVE BOX
HISTORY:  75 y/o male with a past medical history of COPD and EtOH dependence who presented on 12/6/2019 with abdominal pain, nausea, hematemesis, and poor PO intake for ~2-3 days. In the ED, he was found to be hypotensive & tachycardic. Labs revealed an CHI and lactic acidosis. Imaging revealed a high grade SBO in the RLQ on CT scan. Patient was taken to the OR emergently for an exploratory laparotomy, lysis of adhesions, decompression of bowel via enterotomy w/ primary repair, and Abthera VAC placement. The distal 50% of the bowel appeared dusky but viable at that time. He was requiring vasopressor support and was left intubated at the end of the case so patient was admitted to SICU post-operatively. He was taken back to the OR on 12/8/2019 for re-exploration. The proximal 165 cm of small bowel appeared pink & viable but beyond that had patchy areas of ischemia. Decision was made to give the bowel more time to demarcate before resecting in order to preserve as much small bowel as possible so Abthera VAC was replaced. He returned to the OR on 12/10/2019 for re-exploration, small bowel resection of 150 cm (150 cm remaining), ileocecetomy, end ileostomy, mucous fistula, and abdominal closure. He was extubated on 12/11/2019. Hospital course has been complicated by SVT, short bowel syndrome requiring repletions w/ IV fluids, malnutrition requiring TPN, intermittent episodes of hypotension, spontaneous left pneumothorax s/p pigtail catheter (12/15-12/24), left pleural effusion s/p pigtail catheter w/ IR (12/30), dysphagia, and oral HSV lesions.    24 HOUR EVENTS:  - Changed ostomy output repletion frequency from q4hrs to q6hrs  - Continues to have high ileostomy output HISTORY:  75 y/o male with a past medical history of COPD and EtOH dependence who presented on 12/6/2019 with abdominal pain, nausea, hematemesis, and poor PO intake for ~2-3 days. In the ED, he was found to be hypotensive & tachycardic. Labs revealed an CHI and lactic acidosis. Imaging revealed a high grade SBO in the RLQ on CT scan. Patient was taken to the OR emergently for an exploratory laparotomy, lysis of adhesions, decompression of bowel via enterotomy w/ primary repair, and Abthera VAC placement. The distal 50% of the bowel appeared dusky but viable at that time. He was requiring vasopressor support and was left intubated at the end of the case so patient was admitted to SICU post-operatively. He was taken back to the OR on 12/8/2019 for re-exploration. The proximal 165 cm of small bowel appeared pink & viable but beyond that had patchy areas of ischemia. Decision was made to give the bowel more time to demarcate before resecting in order to preserve as much small bowel as possible so Abthera VAC was replaced. He returned to the OR on 12/10/2019 for re-exploration, small bowel resection of 150 cm (150 cm remaining), ileocecetomy, end ileostomy, mucous fistula, and abdominal closure. He was extubated on 12/11/2019. Hospital course has been complicated by SVT, short bowel syndrome requiring repletions w/ IV fluids, malnutrition requiring TPN, intermittent episodes of hypotension, spontaneous left pneumothorax s/p pigtail catheter (12/15-12/24), left pleural effusion s/p pigtail catheter w/ IR (12/30), dysphagia, and oral HSV lesions.    24 HOUR EVENTS:  - Changed ostomy output repletion frequency from q4hrs to q6hrs  - Continues to have high ileostomy output    SUBJECTIVE/ROS:  [x] A ten-point review of systems was otherwise negative except as noted.  [ ] Due to altered mental status/intubation, subjective information were not able to be obtained from the patient. History was obtained, to the extent possible, from review of the chart and collateral sources of information.    NEURO  Exam: awake, alert, oriented x4, no acute distress, no focal deficits  Meds: none  [x] Adequacy of sedation and pain control has been assessed and adjusted    RESPIRATORY  RR: 20 (01-05-20 @ 06:00) (16 - 36)  SpO2: 95% (01-05-20 @ 06:00) (86% - 100%)  Exam: clear to auscultation bilaterally  Mechanical Ventilation: no  [N/A] Extubation Readiness Assessed  Meds:  - albuterol/ipratropium for Nebulization 3 milliLiter(s) Nebulizer every 6 hours  - buDESOnide    Inhalation Suspension 0.5 milliGRAM(s) Inhalation every 12 hours    CARDIOVASCULAR  HR: 86 (01-05-20 @ 06:00) (83 - 102)  BP: 134/66 (01-05-20 @ 06:00) (111/55 - 166/79)  BP(mean): 93 (01-05-20 @ 06:00) (76 - 114)  Exam: regular rate and rhythm, S1S2  Cardiac Rhythm: sinus  Perfusion    [x]Adequate    [ ]Inadequate  Mentation   [x]Normal       [ ]Reduced  Extremities  [x]Warm         [ ]Cool  Volume Status [ ]Hypervolemic [x]Euvolemic [ ]Hypovolemic  Meds: none    GI/NUTRITION  Exam: soft, nontender, nondistended, surgical incision healing well, ileostomy pink & viable w/ bilious output  Diet: dysphagia 1 pureed with nectar consistency fluid  Meds:  - diphenoxylate/atropine 2 Tablet(s) Oral <User Schedule>  - fat emulsion (Fish Oil and Plant Based) 20% Infusion 20.8 mL/Hr IV Continuous <Continuous>  - loperamide 16 milliGRAM(s) Oral <User Schedule>  - octreotide  Injectable 100 MICROGram(s) SubCutaneous three times a day  - ondansetron Injectable 4 milliGRAM(s) IV Push every 6 hours PRN Nausea and/or Vomiting  - opium Tincture 6 milliGRAM(s) Oral <User Schedule>  - pantoprazole  Injectable 40 milliGRAM(s) IV Push daily  - Parenteral Nutrition - Adult 1 Each TPN Continuous <Continuous>    GENITOURINARY  I&O's Detail  Total NET: -150.4 mL      01-04 @ 07:01  -  01-05 @ 06:56  --------------------------------------------------------  IN:    fat emulsion (Fish Oil and Plant Based) 20% Infusion: 239.2 mL    Oral Fluid: 1570 mL    sodium chloride 0.9%: 20 mL    sodium chloride 0.9%.: 220 mL    Solution: 350 mL    Solution: 250 mL    Solution: 2225 mL    TPN (Total Parenteral Nutrition): 1800 mL  Total IN: 6674.2 mL    OUT:    Chest Tube: 700 mL    Ileostomy: 2225 mL    Voided: 2850 mL  Total OUT: 5775 mL    Total NET: 899.2 mL    137  |  101  |  14  ----------------------------<  142<H>  3.8   |  29  |  0.41<L>    Ca    7.5<L>      05 Jan 2020 00:43  Phos  2.7  Mg     1.8  TPro  5.1<L>  /  Alb  2.2<L>  /  TBili  0.5  /  DBili  0.3<H>  /  AST  14  /  ALT  12  /  AlkPhos  72    [ ] Martinez catheter, indication: N/A  Meds: sodium chloride 0.9% for 1:1 ileostomy output repletions    HEMATOLOGIC  Meds: enoxaparin Injectable 40 milliGRAM(s) SubCutaneous daily  [x] VTE Prophylaxis                        7.7    13.37 )-----------( 251      ( 05 Jan 2020 00:43 )             25.1     INFECTIOUS DISEASES  T(C): 37.2 (01-05-20 @ 03:00), Max: 37.3 (01-04-20 @ 23:00)  WBC Count: 13.37 K/uL (01-05 @ 00:43)  Recent Cultures:  - Specimen Source: .Blood Blood, 12-30 @ 22:09  Results: No growth at 5 days.  Gram Stain: --  Organism: --  - Specimen Source: .Body Fluid Pleural Fluid, 12-30 @ 19:16  Results: No growth at 5 days  Gram Stain: No polymorphonuclear cells seen. No organisms seen by cytocentrifuge  Organism: --  Meds: none    ENDOCRINE  Capillary Blood Glucose:  - 134 mg/dL (04 Jan 2020 22:02)  - 144 mg/dL (04 Jan 2020 16:11)  - 116 mg/dL (04 Jan 2020 11:35)  Meds:  - insulin lispro (HumaLOG) corrective regimen sliding scale   SubCutaneous three times a day before meals  - insulin lispro (HumaLOG) corrective regimen sliding scale   SubCutaneous at bedtime    ACCESS DEVICES:  [x] Peripheral IV  [ ] Central Venous Line	[ ] R	[ ] L	[ ] IJ	[ ] Fem	[ ] SC	Placed:   [ ] Arterial Line		[ ] R	[ ] L	[ ] Fem	[ ] Rad	[ ] Ax	Placed:   [x] PICC:	RUE placed on 12/14				[ ] Mediport  [ ] Urinary Catheter, Date Placed:   [x] Necessity of urinary, arterial, and venous catheters discussed    OTHER MEDICATIONS: chlorhexidine 2% Cloths 1 Application(s) Topical <User Schedule>    CODE STATUS: Full code    IMAGING:

## 2020-01-05 NOTE — PROGRESS NOTE ADULT - ASSESSMENT
Assessment	  73 y/o M presenting with septic shock and high grade SBO s/p exploratory laparotomy, lysis of adhesions, decompression of bowel via enterotomy w/ primary repair, and Abthera VAC placement on 12/6; s/p take down of Abthera, washout and re-application of the Abthera vac on 12/8. 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 s/p L chest tube placement by IR 12/30 for pleural effusion.     Recommendations:  - c/w dysphagia diet per speech and swallow   - monitor chest tube output - f/u CTSx for possible pleurodesis for ongoing output   - c/w abx for wound infection  - cont tpn, monitor ostomy output  - no surgical intervention at this time for high ostomy output - too early for reconnection. Continue repletions.  - continue excellent supportive care per SICU    RED SURGERY  [id number]

## 2020-01-05 NOTE — PROGRESS NOTE ADULT - SUBJECTIVE AND OBJECTIVE BOX
GENERAL SURGERY PROGRESS NOTE    SUBJECTIVE  Patient seen and examined. No acute events overnight. Reports tolerating diet without nausea, vomiting, ostomy, voiding without issues. Denies fever, chills, SOB, chest pain.         OBJECTIVE    PHYSICAL EXAM  General: Appears well, NAD  CHEST: breathing comfortably  CV: appears well perfused  Abdomen: soft, nontender, nondistended, no rebound or guarding, midline incision dressing c/d/i, ileostomy pink viable, L CT SS  Extremities: Grossly symmetric    T(C): 37.2 (01-05-20 @ 07:00), Max: 37.3 (01-04-20 @ 23:00)  HR: 95 (01-05-20 @ 08:00) (83 - 102)  BP: 172/100 (01-05-20 @ 08:00) (111/55 - 172/100)  RR: 30 (01-05-20 @ 08:00) (17 - 36)  SpO2: 95% (01-05-20 @ 08:00) (86% - 100%)    01-04-20 @ 07:01  -  01-05-20 @ 07:00  --------------------------------------------------------  IN: 6674.2 mL / OUT: 6025 mL / NET: 649.2 mL    01-05-20 @ 07:01  -  01-05-20 @ 08:27  --------------------------------------------------------  IN: 290 mL / OUT: 450 mL / NET: -160 mL        MEDICATIONS  albuterol/ipratropium for Nebulization 3 milliLiter(s) Nebulizer every 6 hours  buDESOnide    Inhalation Suspension 0.5 milliGRAM(s) Inhalation every 12 hours  chlorhexidine 2% Cloths 1 Application(s) Topical <User Schedule>  diphenoxylate/atropine 2 Tablet(s) Oral <User Schedule>  enoxaparin Injectable 40 milliGRAM(s) SubCutaneous daily  fat emulsion (Fish Oil and Plant Based) 20% Infusion 20.8 mL/Hr IV Continuous <Continuous>  insulin lispro (HumaLOG) corrective regimen sliding scale   SubCutaneous three times a day before meals  insulin lispro (HumaLOG) corrective regimen sliding scale   SubCutaneous at bedtime  loperamide 16 milliGRAM(s) Oral <User Schedule>  octreotide  Injectable 100 MICROGram(s) SubCutaneous three times a day  ondansetron Injectable 4 milliGRAM(s) IV Push every 6 hours PRN  opium Tincture 6 milliGRAM(s) Oral <User Schedule>  pantoprazole  Injectable 40 milliGRAM(s) IV Push daily  Parenteral Nutrition - Adult 1 Each TPN Continuous <Continuous>  sodium chloride 0.9%. 1000 milliLiter(s) IV Continuous <Continuous>      LABS                        7.7    13.37 )-----------( 251      ( 05 Jan 2020 00:43 )             25.1     01-05    137  |  101  |  14  ----------------------------<  142<H>  3.8   |  29  |  0.41<L>    Ca    7.5<L>      05 Jan 2020 00:43  Phos  2.7     01-05  Mg     1.8     01-05    TPro  5.1<L>  /  Alb  2.2<L>  /  TBili  0.5  /  DBili  0.3<H>  /  AST  14  /  ALT  12  /  AlkPhos  72  01-05          RADIOLOGY & ADDITIONAL STUDIES

## 2020-01-05 NOTE — PROGRESS NOTE ADULT - SUBJECTIVE AND OBJECTIVE BOX
24 HOUR EVENTS:  - Changed ostomy output repletion frequency from q4hrs to q6hrs  - Continues to have high ileostomy output    [facility] NUTRITION SUPPORT--  Attending/ PA FOLLOW UP NOTE      24 hour events/subjective: Denies Palpitations, chest pain, shortness of breath. Denies nausea nor vomiting nor abdominal pain.        PAST HISTORY  --------------------------------------------------------------------------------  No significant changes to PMH, PSH, FHx, SHx, unless otherwise noted    ALLERGIES & MEDICATIONS  --------------------------------------------------------------------------------  Allergies    IV Contrast (Hives)    Intolerances      Standing Inpatient Medications  albuterol/ipratropium for Nebulization 3 milliLiter(s) Nebulizer every 6 hours  buDESOnide    Inhalation Suspension 0.5 milliGRAM(s) Inhalation every 12 hours  chlorhexidine 2% Cloths 1 Application(s) Topical <User Schedule>  diphenoxylate/atropine 2 Tablet(s) Oral <User Schedule>  enoxaparin Injectable 40 milliGRAM(s) SubCutaneous daily  fat emulsion (Fish Oil and Plant Based) 20% Infusion 20.8 mL/Hr IV Continuous <Continuous>  insulin lispro (HumaLOG) corrective regimen sliding scale   SubCutaneous three times a day before meals  insulin lispro (HumaLOG) corrective regimen sliding scale   SubCutaneous at bedtime  loperamide 16 milliGRAM(s) Oral <User Schedule>  octreotide  Injectable 100 MICROGram(s) SubCutaneous three times a day  opium Tincture 6 milliGRAM(s) Oral <User Schedule>  pantoprazole  Injectable 40 milliGRAM(s) IV Push daily  Parenteral Nutrition - Adult 1 Each TPN Continuous <Continuous>  sodium chloride 0.9%. 1000 milliLiter(s) IV Continuous <Continuous>    PRN Inpatient Medications  ondansetron Injectable 4 milliGRAM(s) IV Push every 6 hours PRN      REVIEW OF SYSTEMS  --------------------------------------------------------------------------------  Gen: as per HPI  Skin: No rashes  Head/Eyes/Ears/Mouth: No headache;No sore throat  Respiratory: No dyspnea, cough,   CV: No chest pain, PND, orthopnea  GI: as per HPI  : No increased frequency, dysuria, hematuria, nocturia  MSK: No joint pain/swelling; no back pain; no edema  Neuro: No dizziness/lightheadedness, weakness, seizures, numbness, tingling  Psych: No significant nervousness, anxiety, stress, depression    All other systems were reviewed and are negative, except as noted.      LABS/STUDIES  --------------------------------------------------------------------------------              7.7    13.37 >-----------<  251      [01-05-20 @ 00:43]              25.1     137  |  101  |  14  ----------------------------<  142      [01-05-20 @ 00:43]  3.8   |  29  |  0.41        Ca     7.5     [01-05-20 @ 00:43]      Mg     1.8     [01-05-20 @ 00:43]      Phos  2.7     [01-05-20 @ 00:43]    TPro  5.1  /  Alb  2.2  /  TBili  0.5  /  DBili  0.3  /  AST  14  /  ALT  12  /  AlkPhos  72  [01-05-20 @ 00:43]            Glucose, Serum: 142 mg/dL (01-05-20 @ 00:43)    PrealbuminPrealbumin, Serum: 16 mg/dL (01-01-20 @ 02:48)  Prealbumin, Serum: 15 mg/dL (12-31-19 @ 03:11)  Prealbumin, Serum: 16 mg/dL (12-30-19 @ 07:53)  Prealbumin, Serum: 13 mg/dL (12-25-19 @ 04:24)  Prealbumin, Serum: 14 mg/dL (12-24-19 @ 02:35)      01-04-20 @ 07:01  -  01-05-20 @ 07:00  --------------------------------------------------------  IN: 6674.2 mL / OUT: 6025 mL / NET: 649.2 mL    01-05-20 @ 07:01  -  01-05-20 @ 09:23  --------------------------------------------------------  IN: 290 mL / OUT: 450 mL / NET: -160 mL        VITALS/PHYSICAL EXAM  --------------------------------------------------------------------------------  T(C): 37.2 (01-05-20 @ 07:00), Max: 37.3 (01-04-20 @ 23:00)  HR: 95 (01-05-20 @ 08:00) (83 - 102)  BP: 172/100 (01-05-20 @ 08:00) (111/55 - 172/100)  RR: 30 (01-05-20 @ 08:00) (17 - 36)  SpO2: 95% (01-05-20 @ 08:00) (86% - 100%)  Wt(kg): --    Physical Exam:  	Gen: NAD, well-appearing  	HEENT: PERRL, midline trach          Chest: non labored breathing, equal chest expansion b/l  	Abd: +BS, soft, nontender/nondistended  	UE: Warm, FROM, intact strength; no edema; no asterixis  	LE: Warm, FROM, intact strength; no edema  	Neuro: No focal deficits, intact gait  	Psych: Normal affect and mood  	Skin: Warm, without rashes 24 HOUR EVENTS:  - Changed ostomy output repletion frequency from q4hrs to q6hrs  - Continues to have high ileostomy output    [facility] NUTRITION SUPPORT--  Attending/ PA FOLLOW UP NOTE      24 hour events/subjective: Denies Palpitations, chest pain, shortness of breath. Denies nausea nor vomiting nor abdominal pain.        PAST HISTORY  --------------------------------------------------------------------------------  No significant changes to PMH, PSH, FHx, SHx, unless otherwise noted    ALLERGIES & MEDICATIONS  --------------------------------------------------------------------------------  Allergies    IV Contrast (Hives)    Intolerances      Standing Inpatient Medications  albuterol/ipratropium for Nebulization 3 milliLiter(s) Nebulizer every 6 hours  buDESOnide    Inhalation Suspension 0.5 milliGRAM(s) Inhalation every 12 hours  chlorhexidine 2% Cloths 1 Application(s) Topical <User Schedule>  diphenoxylate/atropine 2 Tablet(s) Oral <User Schedule>  enoxaparin Injectable 40 milliGRAM(s) SubCutaneous daily  fat emulsion (Fish Oil and Plant Based) 20% Infusion 20.8 mL/Hr IV Continuous <Continuous>  insulin lispro (HumaLOG) corrective regimen sliding scale   SubCutaneous three times a day before meals  insulin lispro (HumaLOG) corrective regimen sliding scale   SubCutaneous at bedtime  loperamide 16 milliGRAM(s) Oral <User Schedule>  octreotide  Injectable 100 MICROGram(s) SubCutaneous three times a day  opium Tincture 6 milliGRAM(s) Oral <User Schedule>  pantoprazole  Injectable 40 milliGRAM(s) IV Push daily  Parenteral Nutrition - Adult 1 Each TPN Continuous <Continuous>  sodium chloride 0.9%. 1000 milliLiter(s) IV Continuous <Continuous>    PRN Inpatient Medications  ondansetron Injectable 4 milliGRAM(s) IV Push every 6 hours PRN      REVIEW OF SYSTEMS  --------------------------------------------------------------------------------  Gen: as per HPI  Skin: No rashes  Head/Eyes/Ears/Mouth: No headache;No sore throat  Respiratory: No dyspnea, cough,   CV: No chest pain, PND, orthopnea  GI: as per HPI  : No increased frequency, dysuria, hematuria, nocturia  MSK: No joint pain/swelling; no back pain; no edema  Neuro: No dizziness/lightheadedness, weakness, seizures, numbness, tingling  Psych: No significant nervousness, anxiety, stress, depression    All other systems were reviewed and are negative, except as noted.      LABS/STUDIES  --------------------------------------------------------------------------------              7.7    13.37 >-----------<  251      [01-05-20 @ 00:43]              25.1     137  |  101  |  14  ----------------------------<  142      [01-05-20 @ 00:43]  3.8   |  29  |  0.41        Ca     7.5     [01-05-20 @ 00:43]      Mg     1.8     [01-05-20 @ 00:43]      Phos  2.7     [01-05-20 @ 00:43]    TPro  5.1  /  Alb  2.2  /  TBili  0.5  /  DBili  0.3  /  AST  14  /  ALT  12  /  AlkPhos  72  [01-05-20 @ 00:43]            Glucose, Serum: 142 mg/dL (01-05-20 @ 00:43)    PrealbuminPrealbumin, Serum: 16 mg/dL (01-01-20 @ 02:48)  Prealbumin, Serum: 15 mg/dL (12-31-19 @ 03:11)  Prealbumin, Serum: 16 mg/dL (12-30-19 @ 07:53)  Prealbumin, Serum: 13 mg/dL (12-25-19 @ 04:24)  Prealbumin, Serum: 14 mg/dL (12-24-19 @ 02:35)      01-04-20 @ 07:01  -  01-05-20 @ 07:00  --------------------------------------------------------  IN: 6674.2 mL / OUT: 6025 mL / NET: 649.2 mL    01-05-20 @ 07:01  -  01-05-20 @ 09:23  --------------------------------------------------------  IN: 290 mL / OUT: 450 mL / NET: -160 mL        VITALS/PHYSICAL EXAM  --------------------------------------------------------------------------------  T(C): 37.2 (01-05-20 @ 07:00), Max: 37.3 (01-04-20 @ 23:00)  HR: 95 (01-05-20 @ 08:00) (83 - 102)  BP: 172/100 (01-05-20 @ 08:00) (111/55 - 172/100)  RR: 30 (01-05-20 @ 08:00) (17 - 36)  SpO2: 95% (01-05-20 @ 08:00) (86% - 100%)  Wt(kg): --    Physical Exam:    Constitutional: NAD, well-appearing  HEENT: PERRL,  Neck: trach midline  Chest: non labored breathing, equal chest expansion b/l. left sided chest tube in place with serosanguineous drainage   Abd: soft, nontender, nondistended, ostomy pink with stool in bag    Ext: moving all 4 limbs spontaneous, WWP no edema  .  .  .  . [facility] NUTRITION SUPPORT--  Attending/ PA FOLLOW UP NOTE      24 hour events/subjective: Continues on TPN without issues -denies palpitations, chest pain, shortness of breath. Denies nausea nor vomiting nor abdominal pain, reports tolerating all meals.      24 HOUR EVENTS:  - Changed ostomy output repletion frequency from q4hrs to q6hrs  - Continues to have high ileostomy output   PAST HISTORY  --------------------------------------------------------------------------------  No significant changes to PMH, PSH, FHx, SHx, unless otherwise noted    ALLERGIES & MEDICATIONS  --------------------------------------------------------------------------------  Allergies    IV Contrast (Hives)    Intolerances      Standing Inpatient Medications  albuterol/ipratropium for Nebulization 3 milliLiter(s) Nebulizer every 6 hours  buDESOnide    Inhalation Suspension 0.5 milliGRAM(s) Inhalation every 12 hours  chlorhexidine 2% Cloths 1 Application(s) Topical <User Schedule>  diphenoxylate/atropine 2 Tablet(s) Oral <User Schedule>  enoxaparin Injectable 40 milliGRAM(s) SubCutaneous daily  fat emulsion (Fish Oil and Plant Based) 20% Infusion 20.8 mL/Hr IV Continuous <Continuous>  insulin lispro (HumaLOG) corrective regimen sliding scale   SubCutaneous three times a day before meals  insulin lispro (HumaLOG) corrective regimen sliding scale   SubCutaneous at bedtime  loperamide 16 milliGRAM(s) Oral <User Schedule>  octreotide  Injectable 100 MICROGram(s) SubCutaneous three times a day  opium Tincture 6 milliGRAM(s) Oral <User Schedule>  pantoprazole  Injectable 40 milliGRAM(s) IV Push daily  Parenteral Nutrition - Adult 1 Each TPN Continuous <Continuous>  sodium chloride 0.9%. 1000 milliLiter(s) IV Continuous <Continuous>    PRN Inpatient Medications  ondansetron Injectable 4 milliGRAM(s) IV Push every 6 hours PRN      REVIEW OF SYSTEMS  --------------------------------------------------------------------------------  Gen: as per HPI  Skin: No rashes  Head/Eyes/Ears/Mouth: No headache;No sore throat  Respiratory: No dyspnea, cough,   CV: No chest pain, PND, orthopnea  GI: as per HPI  : No increased frequency, dysuria, hematuria, nocturia  MSK: No joint pain/swelling; no back pain; no edema  Neuro: No dizziness/lightheadedness, weakness, seizures, numbness, tingling  Psych: No significant nervousness, anxiety, stress, depression    All other systems were reviewed and are negative, except as noted.      LABS/STUDIES  --------------------------------------------------------------------------------              7.7    13.37 >-----------<  251      [01-05-20 @ 00:43]              25.1     137  |  101  |  14  ----------------------------<  142      [01-05-20 @ 00:43]  3.8   |  29  |  0.41        Ca     7.5     [01-05-20 @ 00:43]      Mg     1.8     [01-05-20 @ 00:43]      Phos  2.7     [01-05-20 @ 00:43]    TPro  5.1  /  Alb  2.2  /  TBili  0.5  /  DBili  0.3  /  AST  14  /  ALT  12  /  AlkPhos  72  [01-05-20 @ 00:43]            Glucose, Serum: 142 mg/dL (01-05-20 @ 00:43)    PrealbuminPrealbumin, Serum: 16 mg/dL (01-01-20 @ 02:48)  Prealbumin, Serum: 15 mg/dL (12-31-19 @ 03:11)  Prealbumin, Serum: 16 mg/dL (12-30-19 @ 07:53)  Prealbumin, Serum: 13 mg/dL (12-25-19 @ 04:24)  Prealbumin, Serum: 14 mg/dL (12-24-19 @ 02:35)      01-04-20 @ 07:01  -  01-05-20 @ 07:00  --------------------------------------------------------  IN: 6674.2 mL / OUT: 6025 mL / NET: 649.2 mL    01-05-20 @ 07:01  -  01-05-20 @ 09:23  --------------------------------------------------------  IN: 290 mL / OUT: 450 mL / NET: -160 mL        VITALS/PHYSICAL EXAM  --------------------------------------------------------------------------------  T(C): 37.2 (01-05-20 @ 07:00), Max: 37.3 (01-04-20 @ 23:00)  HR: 95 (01-05-20 @ 08:00) (83 - 102)  BP: 172/100 (01-05-20 @ 08:00) (111/55 - 172/100)  RR: 30 (01-05-20 @ 08:00) (17 - 36)  SpO2: 95% (01-05-20 @ 08:00) (86% - 100%)  Wt(kg): --    Physical Exam:    Constitutional: NAD, well-appearing  HEENT: PERRL,  Neck: trach midline  Chest: non labored breathing, equal chest expansion b/l. left sided chest tube in place with serosanguineous drainage   Abd: soft, nontender, nondistended, ostomy pink and viable with stool mustard yellow liquid thin  in bag    Ext: FAROM WWP +1 edema on L UE upper foerarm. RUE +1-2 edema PICC CDI no erythema  b/l Lower extrem +1 edema b/l  .  .  .  .

## 2020-01-05 NOTE — PROGRESS NOTE ADULT - ASSESSMENT
75 y/o M presenting with septic shock and high grade SBO s/p exploratory laparotomy, lysis of adhesions, decompression of bowel via enterotomy w/ primary repair, and Abthera VAC placement on 12/6; s/p take down of Abthera, washout and re-application of the Abthera vac on 12/8. Now s/p SBR and end ileostomy on 12/10 acute respiratory distress now improving, Pneumothorax, hyperglycemia, delirium. Now still with persistent pneumothorax when chest pigtail clamped.   12/23 VSS on room air recommending IR drainage of left chest  12/24 No evidence of air leak to left chest tube. Tube clamped. Repeat chest cxr in 4 hours. Anticipate chest tube removal if lung remains expanded. Drainage of loculated left effusion discussed with IR. IR to reconsult for drainage once initial tube removed. Discussed plan with Dr Mayes and IR Fellow  12/24  On NC ,    VSSchest xray sm lt pl eff, left chest tube dc'd.  12/26    2l NC   co  sob,  lt side diminshed  12/27 VSS, CXR with unchanged loculated left pleural effusion- IR consulted for pigtail placement, states effusion can be drained via thoracentesis, pigtail placement not indicated at this time - Dr. Mayes to speak with IR attending.   12/30 Patient with persistent PTX  and left pleural effusion now for IR drainage with Dr Elkins.   12/31 HD stable, L PTC w/ high output, serosanguinous drainage, 990 ml overnight/ 2200 in 24 hrs. Tolerating 2L NC supplemental O2, Cyto pending, continue monitor drainage output.   1/1: LPTC still with High output-400/24h. Continue with pigtail cath drain.  1/ 2     lt pigtail draining  on lws  1/3 HD stable, left pigtail catheter continues to drain. Incentive spirometer encouraged, OOB and mobility, continue pulmonary toileting. Maintain chest tube to suction and monitor output.   1/4: Chest tube milked to prevent clogging of tube, Continues to drain with high out put.  1/5 remains with high out put 170/700. Continue drainage

## 2020-01-05 NOTE — PROGRESS NOTE ADULT - ASSESSMENT
73 y/o male presenting with high grade SBO s/p exploratory laparotomy, lysis of adhesions, decompression of bowel via enterotomy w/ primary repair, & Abthera VAC placement (12/06/2019); RTOR for re-exploration w/ Abthera VAC replacement (12/08/2019) to allow for demarcation of ischemic small bowel; RTOR for re-exploration, small bowel resection of 150 cm (150 cm remaining), ileocecetomy, end ileostomy, mucous fistula, and abdominal closure (12/10/2019); hospital course complicated by SVT, short bowel syndrome requiring repletions w/ IV fluids, malnutrition requiring TPN, intermittent episodes of hypotension, spontaneous left pneumothorax s/p pigtail catheter (12/15/2019-12/24/2019), left pleural effusion s/p pigtail catheter w/ IR (12/30/2019), dysphagia, and oral HSV lesions    PLAN:    Neuro: no acute issues  - Monitor mental status  - Pain control as needed with acetaminophen    Resp: COPD, spontaneous left pneumothorax s/p pigtail catheter, left pleural effusion s/p pigtail catheter w/ IR (12/30)  - Monitor pulse oximeter  - Out of bed to chair, ambulate as tolerated, and incentive spirometry to prevent atelectasis  - Spontaneous left pneumothorax s/p pigtail catheter from 12/15/2019-12/24/2019; left pleural effusion s/p pigtail catheter w/ IR placed on 12/30/2019, will monitor output  - Pulmicort and Duoneb for COPD  - Will need outpatient follow-up with a pulmonologist for his COPD as patient does not currently have one    CV: SVT  - Monitor vital signs  - Was started on metoprolol for episode of SVT but it was discontinued due to intermittent episodes of hypotension, HR currently in the 80-90s so will not restart for now    GI: s/p exploratory laparotomy, G[place]ia, decompression of bowel via enterotomy w/ primary repair, & Abthera VAC placement (12/06/2019); re-exploration w/ Abthera VAC replacement (12/08/2019); re-exploration, small bowel resection of 150 cm (150 cm remaining), ileocecetomy, end ileostomy, mucous fistula, and abdominal closure (12/10/2019); short bowel syndrome, malnutrition, dysphagia  - Dysphagia 1 pureed w/ nectar consistency fluid with TPN, currently undergoing calorie count to determine if TPN can be weaned  - Failed FEES with thin liquids on 1/2, will need modified barium swallow prior to discharge so that patient can possibly be discharged with diet upgraded to thin liquids  - Monitor ileostomy output, 1:1 repletions of ostomy output with normal saline  - Continue Lomotil, tincture of opium, loperamide, and octreotide for short bowel syndrome  - Protonix to decrease gastric secretions    Renal: no acute issues  - Monitor I&Os  - Monitor electrolytes and replete as necessary    Heme: anemia likely secondary to malnutrition / malabsorption  - Monitor CBC and coags  - Will send off anemia labs  - Lovenox for VTE prophylaxis    ID: oral HSV lesions (s/p acyclovir 12/23/2019-01/02/2020)  - Monitor for clinical evidence of active infection    Endo: no acute issues  - Monitor glucose before meals & at bedtime while on TPN, currently no insulin in TPN bag and patient has not required SSI coverage    Disposition:  - Full code  - Will remain in SICU    Janelle Bah PA-C     [id number]

## 2020-01-05 NOTE — PROGRESS NOTE ADULT - ASSESSMENT
74 year old male w/ PMH of COPD and EtOH dependence with PSH/o appy, prostate surgery, & spine surgery  septic shock and high grade SBO s/p exploratory laparotomy, lysis of adhesions, decompression of bowel via enterotomy w/ primary repair, and Abthera VAC placement on 12/6; s/p take down of Abthera, washout and re-application of the Abthera vac on 12/8. Now s/p SBR and end ileostomy on 12/10.   TPN consulted to assist w/ management of pt's nutrition in pt w/ prolonged hospital course now tolerating diet but has high Ileostomy output     - Continue TPN at full strength in pt w/ severe Protein-Calorie Malnutrition      - Tolerating Dysphagia 1 Pureed-Nectar Consistency Fluid   - HypoNa  improving & will continue to monitor-maintaining K levels will help improve Na  - Elevated K -improving, will keep KCl of 40 mEq KCl in TPN          possibly due to Octreotide  - HypoPhos resolving, continue NaPhos of 45mMol   - Strict Intake and Output-  high ileostomy output - greatly improved, continue to monitor. Pt on increased lomotil, imodium, tincture of opium, & Octreotide   - f/u fecal fat testing, 72hr fecal studies to see what pt is absorbing to assess ongoing needs in pt with concerns for Short Gut Syndrome in setting of high ostomy output  - Hyperglycemia - improving - Insulin removed from TPN. Fingersticks & ISS coverage - to be ordered by primary team  - Ancef course for cellulitis of incision - continue to monitor improvement  - IR placed pigtail Lt chest effusion- still high drainage, continue to monitor as per SICU/ IR/ CTS  - PICC w/dedicated port for only TPN - maintenance as per protocol  - Weights three times a week  - Monitor BMP, Mg, Ionized Ca, Phosphorus daily  - Continue to monitor Triglycerides and Pre-albumin weekly  - Continue as per SICU / Surgery, will follow with you, D/w primary team      TPN team, pager [phone]  D/w Ana M Siegel

## 2020-01-05 NOTE — PROGRESS NOTE ADULT - SUBJECTIVE AND OBJECTIVE BOX
Subjective: Patient seen and examined. No new events except as noted.   Remains in ICU   BP much improved   no cp or sob     REVIEW OF SYSTEMS:    CONSTITUTIONAL: +weakness, fevers or chills  EYES/ENT: No visual changes;  No vertigo or throat pain   NECK: No pain or stiffness  RESPIRATORY: No cough, wheezing, hemoptysis; No shortness of breath  CARDIOVASCULAR: No chest pain or palpitations  GASTROINTESTINAL: No abdominal or epigastric pain. No nausea, vomiting, or hematemesis; No diarrhea or constipation. No melena or hematochezia.  GENITOURINARY: No dysuria, frequency or hematuria  NEUROLOGICAL: No numbness or weakness  SKIN: No itching, burning, rashes, or lesions   All other review of systems is negative unless indicated above.    MEDICATIONS:  MEDICATIONS  (STANDING):  albuterol/ipratropium for Nebulization 3 milliLiter(s) Nebulizer every 6 hours  buDESOnide    Inhalation Suspension 0.5 milliGRAM(s) Inhalation every 12 hours  chlorhexidine 2% Cloths 1 Application(s) Topical <User Schedule>  diphenoxylate/atropine 2 Tablet(s) Oral <User Schedule>  enoxaparin Injectable 40 milliGRAM(s) SubCutaneous daily  fat emulsion (Fish Oil and Plant Based) 20% Infusion 20.8 mL/Hr (20.8 mL/Hr) IV Continuous <Continuous>  insulin lispro (HumaLOG) corrective regimen sliding scale   SubCutaneous three times a day before meals  insulin lispro (HumaLOG) corrective regimen sliding scale   SubCutaneous at bedtime  loperamide 16 milliGRAM(s) Oral <User Schedule>  octreotide  Injectable 100 MICROGram(s) SubCutaneous three times a day  opium Tincture 6 milliGRAM(s) Oral <User Schedule>  pantoprazole  Injectable 40 milliGRAM(s) IV Push daily  Parenteral Nutrition - Adult 1 Each (75 mL/Hr) TPN Continuous <Continuous>  Parenteral Nutrition - Adult 1 Each (75 mL/Hr) TPN Continuous <Continuous>  sodium chloride 0.9%. 1000 milliLiter(s) (10 mL/Hr) IV Continuous <Continuous>      PHYSICAL EXAM:  T(C): 37.1 (01-05-20 @ 11:00), Max: 37.3 (01-04-20 @ 23:00)  HR: 91 (01-05-20 @ 12:00) (83 - 102)  BP: 167/105 (01-05-20 @ 13:00) (111/55 - 172/100)  RR: 28 (01-05-20 @ 13:00) (17 - 36)  SpO2: 94% (01-05-20 @ 13:00) (81% - 100%)  Wt(kg): --  I&O's Summary    04 Jan 2020 07:01  -  05 Jan 2020 07:00  --------------------------------------------------------  IN: 6674.2 mL / OUT: 6025 mL / NET: 649.2 mL    05 Jan 2020 07:01  -  05 Jan 2020 13:11  --------------------------------------------------------  IN: 745 mL / OUT: 1550 mL / NET: -805 mL                Appearance: NAD   HEENT:   Dry oral mucosa, crusted lips   Lymphatic: No lymphadenopathy   Cardiovascular: Normal S1 S2, No JVD, No murmurs , Peripheral pulses palpable 2+ bilaterally  Respiratory: Decreased bs   Gastrointestinal:  Soft, NT, ND. Ostomy pink with output. Midline staples in place, midline incision site is c/d/i   Skin: No rashes, No ecchymoses, No cyanosis, warm to touch  Musculoskeletal: Decreased  range of motion and strength  Psychiatry:  mildly sedated   Ext: No edema +PICC line   +rosas   +rectal tube         LABS:    CARDIAC MARKERS:                                7.7    13.37 )-----------( 251      ( 05 Jan 2020 00:43 )             25.1     01-05    137  |  101  |  14  ----------------------------<  142<H>  3.8   |  29  |  0.41<L>    Ca    7.5<L>      05 Jan 2020 00:43  Phos  2.7     01-05  Mg     1.8     01-05    TPro  5.1<L>  /  Alb  2.2<L>  /  TBili  0.5  /  DBili  0.3<H>  /  AST  14  /  ALT  12  /  AlkPhos  72  01-05    proBNP:   Lipid Profile:   HgA1c:   TSH:             TELEMETRY: 	SR    ECG:  	  RADIOLOGY:   < from: Xray Chest 1 View- PORTABLE-Routine (01.05.20 @ 07:24) >    EXAM:  XR CHEST PORTABLE ROUTINE 1V                            PROCEDURE DATE:  01/05/2020            INTERPRETATION:  CLINICAL INFORMATION: Left-sided chest tube placement.    EXAM: AP chest radiograph.    COMPARISON: Chest radiograph from 1/4/2020.    FINDINGS AND   IMPRESSION:  Left-sided pigtail catheter overlies the left lower hemithorax. Right-sided PICC line with tip in SVC.  The heart size is unremarkable. Aortic knob calcifications.  Unchanged bilateral pleural effusions with adjacent atelectasis and patchy opacity in the right lower lung. No pneumothorax.  The visualized osseous structures demonstrate no acute pathology.                ANNETTA ASIF M.D., RADIOLOGY RESIDENT  This document has been electronically signed.  CALLY PANDEY M.D., ATTENDING RADIOLOGIST  This document has been electronically signed. Jan 5 2020  8:52AM                < end of copied text >    DIAGNOSTIC TESTING:  [ ] Echocardiogram:  [ ]  Catheterization:  [ ] Stress Test:    OTHER:

## 2020-01-05 NOTE — PROGRESS NOTE ADULT - SUBJECTIVE AND OBJECTIVE BOX
INTERVAL HPI/OVERNIGHT EVENTS: Pt seen and examined. States he is feeling alright. Tolerating diet without N/V. Denies abdominal pain.     MEDICATIONS  (STANDING):  albuterol/ipratropium for Nebulization 3 milliLiter(s) Nebulizer every 6 hours  buDESOnide    Inhalation Suspension 0.5 milliGRAM(s) Inhalation every 12 hours  chlorhexidine 2% Cloths 1 Application(s) Topical <User Schedule>  diphenoxylate/atropine 2 Tablet(s) Oral <User Schedule>  enoxaparin Injectable 40 milliGRAM(s) SubCutaneous daily  fat emulsion (Fish Oil and Plant Based) 20% Infusion 20.8 mL/Hr (20.8 mL/Hr) IV Continuous <Continuous>  insulin lispro (HumaLOG) corrective regimen sliding scale   SubCutaneous three times a day before meals  insulin lispro (HumaLOG) corrective regimen sliding scale   SubCutaneous at bedtime  loperamide 16 milliGRAM(s) Oral <User Schedule>  octreotide  Injectable 100 MICROGram(s) SubCutaneous three times a day  opium Tincture 6 milliGRAM(s) Oral <User Schedule>  pantoprazole  Injectable 40 milliGRAM(s) IV Push daily  Parenteral Nutrition - Adult 1 Each (75 mL/Hr) TPN Continuous <Continuous>  Parenteral Nutrition - Adult 1 Each (75 mL/Hr) TPN Continuous <Continuous>  sodium chloride 0.9%. 1000 milliLiter(s) (10 mL/Hr) IV Continuous <Continuous>    MEDICATIONS  (PRN):  ondansetron Injectable 4 milliGRAM(s) IV Push every 6 hours PRN Nausea and/or Vomiting      Vital Signs Last 24 Hrs  T(C): 37.1 (05 Jan 2020 11:00), Max: 37.3 (04 Jan 2020 23:00)  T(F): 98.8 (05 Jan 2020 11:00), Max: 99.1 (04 Jan 2020 23:00)  HR: 91 (05 Jan 2020 12:00) (83 - 102)  BP: 167/105 (05 Jan 2020 13:00) (111/55 - 172/100)  BP(mean): 121 (05 Jan 2020 13:00) (76 - 128)  RR: 28 (05 Jan 2020 13:00) (17 - 36)  SpO2: 94% (05 Jan 2020 13:00) (81% - 100%)    PHYSICAL EXAM:      Constitutional: NAD, resting comfortably in hospital bed    Respiratory: breathing comfortably on supplemental O2 via NC    Gastrointestinal: Abd soft, minimally distended, end ileostomy pink and viable with high liquid output. Dressing c/d/i.            I&O's Detail    04 Jan 2020 07:01  -  05 Jan 2020 07:00  --------------------------------------------------------  IN:    fat emulsion (Fish Oil and Plant Based) 20% Infusion: 239.2 mL    Oral Fluid: 1570 mL    sodium chloride 0.9%: 20 mL    sodium chloride 0.9%: 220 mL    Solution: 350 mL    Solution: 250 mL    Solution: 2225 mL    TPN (Total Parenteral Nutrition): 1800 mL  Total IN: 6674.2 mL    OUT:    Chest Tube: 700 mL    Ileostomy: 2225 mL    Voided: 3100 mL  Total OUT: 6025 mL    Total NET: 649.2 mL      05 Jan 2020 07:01  -  05 Jan 2020 14:01  --------------------------------------------------------  IN:    Oral Fluid: 120 mL    sodium chloride 0.9%: 40 mL    sodium chloride 0.9%.: 10 mL    Solution: 200 mL    TPN (Total Parenteral Nutrition): 375 mL  Total IN: 745 mL    OUT:    Ileostomy: 400 mL    Voided: 1150 mL  Total OUT: 1550 mL    Total NET: -805 mL          LABS:                        7.7    13.37 )-----------( 251      ( 05 Jan 2020 00:43 )             25.1     01-05    137  |  101  |  14  ----------------------------<  142<H>  3.8   |  29  |  0.41<L>    Ca    7.5<L>      05 Jan 2020 00:43  Phos  2.7     01-05  Mg     1.8     01-05    TPro  5.1<L>  /  Alb  2.2<L>  /  TBili  0.5  /  DBili  0.3<H>  /  AST  14  /  ALT  12  /  AlkPhos  72  01-05          RADIOLOGY & ADDITIONAL STUDIES:

## 2020-01-05 NOTE — PROGRESS NOTE ADULT - PROBLEM SELECTOR PLAN 1
sp  IR placed lt pigtail  keep lws  daily chest xray while pl tube in place  continue monitor chest tube output  incentive spx  pulmonary toileting  oob and mobilize  will follow

## 2020-01-05 NOTE — PROGRESS NOTE ADULT - SUBJECTIVE AND OBJECTIVE BOX
Subjective: Pt states" " Denies any CP, SOB, palpitations. No acute events overnight.    Vital Signs:  Vital Signs Last 24 Hrs  T(C): 37.1 (01-05-20 @ 11:00), Max: 37.3 (01-04-20 @ 23:00)  T(F): 98.8 (01-05-20 @ 11:00), Max: 99.1 (01-04-20 @ 23:00)  HR: 96 (01-05-20 @ 15:00) (83 - 102)  BP: 153/71 (01-05-20 @ 15:00) (112/57 - 192/80)  RR: 28 (01-05-20 @ 15:00) (17 - 36)  SpO2: 86% (01-05-20 @ 15:00) (81% - 100%) on (O2)        Relevant labs, radiology and Medications reviewed                        7.7    13.37 )-----------( 251      ( 05 Jan 2020 00:43 )             25.1     01-05    137  |  101  |  14  ----------------------------<  142<H>  3.8   |  29  |  0.41<L>    Ca    7.5<L>      05 Jan 2020 00:43  Phos  2.7     01-05  Mg     1.8     01-05    TPro  5.1<L>  /  Alb  2.2<L>  /  TBili  0.5  /  DBili  0.3<H>  /  AST  14  /  ALT  12  /  AlkPhos  72  01-05      MEDICATIONS  (STANDING):  albuterol/ipratropium for Nebulization 3 milliLiter(s) Nebulizer every 6 hours  buDESOnide    Inhalation Suspension 0.5 milliGRAM(s) Inhalation every 12 hours  chlorhexidine 2% Cloths 1 Application(s) Topical <User Schedule>  diphenoxylate/atropine 2 Tablet(s) Oral <User Schedule>  enoxaparin Injectable 40 milliGRAM(s) SubCutaneous daily  fat emulsion (Fish Oil and Plant Based) 20% Infusion 20.8 mL/Hr (20.8 mL/Hr) IV Continuous <Continuous>  insulin lispro (HumaLOG) corrective regimen sliding scale   SubCutaneous three times a day before meals  insulin lispro (HumaLOG) corrective regimen sliding scale   SubCutaneous at bedtime  loperamide 16 milliGRAM(s) Oral <User Schedule>  octreotide  Injectable 100 MICROGram(s) SubCutaneous three times a day  opium Tincture 6 milliGRAM(s) Oral <User Schedule>  pantoprazole  Injectable 40 milliGRAM(s) IV Push daily  Parenteral Nutrition - Adult 1 Each (75 mL/Hr) TPN Continuous <Continuous>  Parenteral Nutrition - Adult 1 Each (75 mL/Hr) TPN Continuous <Continuous>  sodium chloride 0.9%. 1000 milliLiter(s) (10 mL/Hr) IV Continuous <Continuous>    MEDICATIONS  (PRN):  ondansetron Injectable 4 milliGRAM(s) IV Push every 6 hours PRN Nausea and/or Vomiting      I&O's Summary    04 Jan 2020 07:01  -  05 Jan 2020 07:00  --------------------------------------------------------  IN: 6674.2 mL / OUT: 6025 mL / NET: 649.2 mL    05 Jan 2020 07:01  -  05 Jan 2020 15:30  --------------------------------------------------------  IN: 1085 mL / OUT: 1900 mL / NET: -815 mL        IMAGING    CXR:    CT Chest:    PAST MEDICAL & SURGICAL HISTORY:  COPD with hypoxia  ETOHism  ETOH abuse  History of lumbosacral spine surgery  History of prostate surgery  History of appendectomy       Physical Exam:  Neurology: A&Ox3, nonfocal, WILEY x 4, NAD  Respiratory: B/L BS CTA, diminished at bases, No wheezing, rales, rhonchi  CV: RRR, S1S2

## 2020-01-05 NOTE — PROGRESS NOTE ADULT - ASSESSMENT
74M POD 26 s/p ileocolic resection, creation of end ileostomy and mucus fistula for ischemic SBO c/b high stoma output and persistent PTX requiring left CT    - Dysphagia 1 diet  - TPN per nutrition  - c/w Lomotil, opium tincture, octreotide, and imodium for SBS  - possible pleurodesis per CTSx  - excellent care per SICU  - Pt d/w Dr. Siegel

## 2020-01-06 LAB
ALBUMIN SERPL ELPH-MCNC: 2.1 G/DL — LOW (ref 3.3–5)
ALP SERPL-CCNC: 74 U/L — SIGNIFICANT CHANGE UP (ref 40–120)
ALT FLD-CCNC: 15 U/L — SIGNIFICANT CHANGE UP (ref 10–45)
ANION GAP SERPL CALC-SCNC: 10 MMOL/L — SIGNIFICANT CHANGE UP (ref 5–17)
AST SERPL-CCNC: 12 U/L — SIGNIFICANT CHANGE UP (ref 10–40)
BILIRUB DIRECT SERPL-MCNC: 0.3 MG/DL — HIGH (ref 0–0.2)
BILIRUB INDIRECT FLD-MCNC: 0.3 MG/DL — SIGNIFICANT CHANGE UP (ref 0.2–1)
BILIRUB SERPL-MCNC: 0.6 MG/DL — SIGNIFICANT CHANGE UP (ref 0.2–1.2)
BUN SERPL-MCNC: 14 MG/DL — SIGNIFICANT CHANGE UP (ref 7–23)
CALCIUM SERPL-MCNC: 7.5 MG/DL — LOW (ref 8.4–10.5)
CHLORIDE SERPL-SCNC: 98 MMOL/L — SIGNIFICANT CHANGE UP (ref 96–108)
CO2 SERPL-SCNC: 29 MMOL/L — SIGNIFICANT CHANGE UP (ref 22–31)
CREAT SERPL-MCNC: 0.44 MG/DL — LOW (ref 0.5–1.3)
FERRITIN SERPL-MCNC: 426 NG/ML — HIGH (ref 30–400)
FOLATE SERPL-MCNC: 11.9 NG/ML — SIGNIFICANT CHANGE UP
GLUCOSE BLDC GLUCOMTR-MCNC: 106 MG/DL — HIGH (ref 70–99)
GLUCOSE BLDC GLUCOMTR-MCNC: 108 MG/DL — HIGH (ref 70–99)
GLUCOSE BLDC GLUCOMTR-MCNC: 126 MG/DL — HIGH (ref 70–99)
GLUCOSE BLDC GLUCOMTR-MCNC: 133 MG/DL — HIGH (ref 70–99)
GLUCOSE SERPL-MCNC: 120 MG/DL — HIGH (ref 70–99)
HCT VFR BLD CALC: 25.7 % — LOW (ref 39–50)
HGB BLD-MCNC: 7.8 G/DL — LOW (ref 13–17)
IRON SATN MFR SERPL: 16 % — SIGNIFICANT CHANGE UP (ref 16–55)
IRON SATN MFR SERPL: 24 UG/DL — LOW (ref 45–165)
MAGNESIUM SERPL-MCNC: 1.8 MG/DL — SIGNIFICANT CHANGE UP (ref 1.6–2.6)
MCHC RBC-ENTMCNC: 29.4 PG — SIGNIFICANT CHANGE UP (ref 27–34)
MCHC RBC-ENTMCNC: 30.4 GM/DL — LOW (ref 32–36)
MCV RBC AUTO: 97 FL — SIGNIFICANT CHANGE UP (ref 80–100)
NRBC # BLD: 0 /100 WBCS — SIGNIFICANT CHANGE UP (ref 0–0)
PHOSPHATE SERPL-MCNC: 3.2 MG/DL — SIGNIFICANT CHANGE UP (ref 2.5–4.5)
PLATELET # BLD AUTO: 253 K/UL — SIGNIFICANT CHANGE UP (ref 150–400)
POTASSIUM SERPL-MCNC: 4.3 MMOL/L — SIGNIFICANT CHANGE UP (ref 3.5–5.3)
POTASSIUM SERPL-SCNC: 4.3 MMOL/L — SIGNIFICANT CHANGE UP (ref 3.5–5.3)
PREALB SERPL-MCNC: 14 MG/DL — LOW (ref 20–40)
PROT SERPL-MCNC: 5.1 G/DL — LOW (ref 6–8.3)
RBC # BLD: 2.65 M/UL — LOW (ref 4.2–5.8)
RBC # FLD: 15.2 % — HIGH (ref 10.3–14.5)
SODIUM SERPL-SCNC: 137 MMOL/L — SIGNIFICANT CHANGE UP (ref 135–145)
TIBC SERPL-MCNC: 154 UG/DL — LOW (ref 220–430)
TRANSFERRIN SERPL-MCNC: 120 MG/DL — LOW (ref 200–360)
TRIGL SERPL-MCNC: 48 MG/DL — SIGNIFICANT CHANGE UP (ref 10–149)
UIBC SERPL-MCNC: 130 UG/DL — SIGNIFICANT CHANGE UP (ref 110–370)
VIT B12 SERPL-MCNC: 1025 PG/ML — SIGNIFICANT CHANGE UP (ref 232–1245)
WBC # BLD: 14.03 K/UL — HIGH (ref 3.8–10.5)
WBC # FLD AUTO: 14.03 K/UL — HIGH (ref 3.8–10.5)

## 2020-01-06 PROCEDURE — 99231 SBSQ HOSP IP/OBS SF/LOW 25: CPT

## 2020-01-06 PROCEDURE — 99232 SBSQ HOSP IP/OBS MODERATE 35: CPT

## 2020-01-06 PROCEDURE — 71045 X-RAY EXAM CHEST 1 VIEW: CPT | Mod: 26

## 2020-01-06 RX ORDER — I.V. FAT EMULSION 20 G/100ML
20.8 EMULSION INTRAVENOUS
Qty: 50 | Refills: 0 | Status: DISCONTINUED | OUTPATIENT
Start: 2020-01-06 | End: 2020-01-07

## 2020-01-06 RX ORDER — MAGNESIUM SULFATE 500 MG/ML
2 VIAL (ML) INJECTION ONCE
Refills: 0 | Status: COMPLETED | OUTPATIENT
Start: 2020-01-06 | End: 2020-01-06

## 2020-01-06 RX ORDER — ELECTROLYTE SOLUTION,INJ
1 VIAL (ML) INTRAVENOUS
Refills: 0 | Status: DISCONTINUED | OUTPATIENT
Start: 2020-01-06 | End: 2020-01-06

## 2020-01-06 RX ADMIN — Medication 3 MILLILITER(S): at 05:05

## 2020-01-06 RX ADMIN — Medication 0.5 MILLIGRAM(S): at 05:05

## 2020-01-06 RX ADMIN — MORPHINE 6 MILLIGRAM(S): 10 SOLUTION ORAL at 22:54

## 2020-01-06 RX ADMIN — MORPHINE 6 MILLIGRAM(S): 10 SOLUTION ORAL at 17:41

## 2020-01-06 RX ADMIN — PANTOPRAZOLE SODIUM 40 MILLIGRAM(S): 20 TABLET, DELAYED RELEASE ORAL at 12:45

## 2020-01-06 RX ADMIN — Medication 16 MILLIGRAM(S): at 22:54

## 2020-01-06 RX ADMIN — Medication 16 MILLIGRAM(S): at 17:41

## 2020-01-06 RX ADMIN — MORPHINE 6 MILLIGRAM(S): 10 SOLUTION ORAL at 12:45

## 2020-01-06 RX ADMIN — Medication 2 TABLET(S): at 22:54

## 2020-01-06 RX ADMIN — Medication 2 TABLET(S): at 12:45

## 2020-01-06 RX ADMIN — Medication 3 MILLILITER(S): at 17:05

## 2020-01-06 RX ADMIN — Medication 50 GRAM(S): at 06:16

## 2020-01-06 RX ADMIN — ENOXAPARIN SODIUM 40 MILLIGRAM(S): 100 INJECTION SUBCUTANEOUS at 12:44

## 2020-01-06 RX ADMIN — Medication 0.5 MILLIGRAM(S): at 17:05

## 2020-01-06 RX ADMIN — CHLORHEXIDINE GLUCONATE 1 APPLICATION(S): 213 SOLUTION TOPICAL at 06:16

## 2020-01-06 RX ADMIN — Medication 1 EACH: at 17:40

## 2020-01-06 RX ADMIN — OCTREOTIDE ACETATE 100 MICROGRAM(S): 200 INJECTION, SOLUTION INTRAVENOUS; SUBCUTANEOUS at 18:35

## 2020-01-06 RX ADMIN — I.V. FAT EMULSION 20.8 ML/HR: 20 EMULSION INTRAVENOUS at 17:40

## 2020-01-06 RX ADMIN — Medication 16 MILLIGRAM(S): at 12:45

## 2020-01-06 RX ADMIN — MORPHINE 6 MILLIGRAM(S): 10 SOLUTION ORAL at 08:34

## 2020-01-06 RX ADMIN — Medication 2 TABLET(S): at 17:40

## 2020-01-06 RX ADMIN — Medication 2 TABLET(S): at 08:34

## 2020-01-06 RX ADMIN — SODIUM CHLORIDE 10 MILLILITER(S): 9 INJECTION INTRAMUSCULAR; INTRAVENOUS; SUBCUTANEOUS at 08:33

## 2020-01-06 RX ADMIN — OCTREOTIDE ACETATE 100 MICROGRAM(S): 200 INJECTION, SOLUTION INTRAVENOUS; SUBCUTANEOUS at 06:17

## 2020-01-06 RX ADMIN — Medication 3 MILLILITER(S): at 11:12

## 2020-01-06 RX ADMIN — Medication 3 MILLILITER(S): at 23:57

## 2020-01-06 RX ADMIN — OCTREOTIDE ACETATE 100 MICROGRAM(S): 200 INJECTION, SOLUTION INTRAVENOUS; SUBCUTANEOUS at 22:56

## 2020-01-06 RX ADMIN — Medication 16 MILLIGRAM(S): at 08:35

## 2020-01-06 NOTE — PROGRESS NOTE ADULT - ASSESSMENT
75 y/o male presenting with high grade SBO s/p exploratory laparotomy, lysis of adhesions, decompression of bowel via enterotomy w/ primary repair, & Abthera VAC placement (12/06/2019); RTOR for re-exploration w/ Abthera VAC replacement (12/08/2019) to allow for demarcation of ischemic small bowel; RTOR for re-exploration, small bowel resection of 150 cm (150 cm remaining), ileocecetomy, end ileostomy, mucous fistula, and abdominal closure (12/10/2019); hospital course complicated by SVT, short bowel syndrome requiring repletions w/ IV fluids, malnutrition requiring TPN, intermittent episodes of hypotension, spontaneous left pneumothorax s/p pigtail catheter (12/15/2019-12/24/2019), left pleural effusion s/p pigtail catheter w/ IR (12/30/2019), dysphagia, and oral HSV lesions    PLAN:    Neuro: no acute issues  - Monitor mental status  - Pain control as needed with acetaminophen    Resp: COPD, spontaneous left pneumothorax s/p pigtail catheter, left pleural effusion s/p pigtail catheter w/ IR (12/30)  - Monitor pulse oximeter  - Out of bed to chair, ambulate as tolerated, and incentive spirometry to prevent atelectasis  - Spontaneous left pneumothorax s/p pigtail catheter from 12/15/2019-12/24/2019; left pleural effusion s/p pigtail catheter w/ IR placed on 12/30/2019, will monitor output  - Pulmicort and Duoneb for COPD  - Will need outpatient follow-up with a pulmonologist for his COPD as patient does not currently have one    CV: SVT  - Monitor vital signs  - Was started on metoprolol for episode of SVT but it was discontinued due to intermittent episodes of hypotension, HR currently in the 80-90s so will not restart for now    GI: s/p exploratory laparotomy, [place], decompression of bowel via enterotomy w/ primary repair, & Abthera VAC placement (12/06/2019); re-exploration w/ Abthera VAC replacement (12/08/2019); re-exploration, small bowel resection of 150 cm (150 cm remaining), ileocecetomy, end ileostomy, mucous fistula, and abdominal closure (12/10/2019); short bowel syndrome, malnutrition, dysphagia  - Dysphagia 1 pureed w/ nectar consistency fluid with TPN, currently undergoing calorie count to determine if TPN can be weaned  - Failed FEES with thin liquids on 1/2, will need modified barium swallow prior to discharge so that patient can possibly be discharged with diet upgraded to thin liquids  - Monitor ileostomy output, 0.5:1 repletions of ostomy output with normal saline  - Continue Lomotil, tincture of opium, loperamide, and octreotide for short bowel syndrome  - Protonix to decrease gastric secretions    Renal: no acute issues  - Monitor I&Os  - Monitor electrolytes and replete as necessary    Heme: anemia likely secondary to malnutrition / malabsorption  - Monitor CBC and coags  - Follow up anemia labs  - Lovenox for VTE prophylaxis    ID: oral HSV lesions (s/p acyclovir 12/23/2019-01/02/2020)  - Monitor for clinical evidence of active infection    Endo: no acute issues  - Monitor glucose before meals & at bedtime while on TPN, currently no insulin in TPN bag and patient has not required SSI coverage    Disposition:  - Full code  - Will remain in SICU    Janelle Bah PA-C     [id number]

## 2020-01-06 NOTE — PROGRESS NOTE ADULT - SUBJECTIVE AND OBJECTIVE BOX
[facility] NUTRITION SUPPORT / TPN -- FOLLOW UP NOTE  --------------------------------------------------------------------------------    24 hour events/subjective:    Tolerating TPN & eating dysphagia diet - eating well       Continue Octreotide, lomotil, Imodium, & tincture of opium        Strict I&Os       PPI to help decrease gastric secretions       high ostomy out put       repletion as per SICU  72hr collection for Fecal Fat & electrolytes ongoing  SOB improved w/ NC O2  Pt denies any pain/ dyspnea/ cough/ palp  no n/v   No f/c/s    Diet:  Diet, Dysphagia 1 Pureed-Nectar Consistency Fluid:   Supplement Feeding Modality:  Oral  Ensure Pudding Cans or Servings Per Day:  2       Frequency:  Two Times a day (01-06-20 @ 09:38)      Appetite: [  ]Poor [  ]Adequate [ x ]Good  Caloric intake:  [  x ]  Adequate   [   ] Inadequate    ROS: General/ GI see HPI  all other systems negative    ALLERGIES & MEDICATIONS  --------------------------------------------------------------------------------  ALLERGIES  IV Contrast (Hives)    STANDING INPATIENT MEDICATIONS    albuterol/ipratropium for Nebulization 3 milliLiter(s) Nebulizer every 6 hours  buDESOnide    Inhalation Suspension 0.5 milliGRAM(s) Inhalation every 12 hours  chlorhexidine 2% Cloths 1 Application(s) Topical <User Schedule>  diphenoxylate/atropine 2 Tablet(s) Oral <User Schedule>  enoxaparin Injectable 40 milliGRAM(s) SubCutaneous daily  fat emulsion (Fish Oil and Plant Based) 20% Infusion 20.8 mL/Hr IV Continuous <Continuous>  insulin lispro (HumaLOG) corrective regimen sliding scale   SubCutaneous three times a day before meals  insulin lispro (HumaLOG) corrective regimen sliding scale   SubCutaneous at bedtime  loperamide 16 milliGRAM(s) Oral <User Schedule>  octreotide  Injectable 100 MICROGram(s) SubCutaneous three times a day  opium Tincture 6 milliGRAM(s) Oral <User Schedule>  pantoprazole  Injectable 40 milliGRAM(s) IV Push daily  Parenteral Nutrition - Adult 1 Each TPN Continuous <Continuous>  Parenteral Nutrition - Adult 1 Each TPN Continuous <Continuous>  sodium chloride 0.9%. 1000 milliLiter(s) IV Continuous <Continuous>      PRN INPATIENT MEDICATION  ondansetron Injectable 4 milliGRAM(s) IV Push every 6 hours PRN        VITALS/PHYSICAL EXAM  --------------------------------------------------------------------------------  T(C): 36.6 (01-06-20 @ 11:00), Max: 37.5 (01-05-20 @ 19:00)  HR: 85 (01-06-20 @ 15:00) (79 - 103)  BP: 84/47 (01-06-20 @ 15:00) (84/47 - 173/78)  RR: 22 (01-06-20 @ 15:00) (18 - 34)  SpO2: 98% (01-06-20 @ 15:00) (91% - 100%)  Wt(kg): --        01-05-20 @ 07:01  -  01-06-20 @ 07:00  --------------------------------------------------------  IN: 3211.6 mL / OUT: 5350 mL / NET: -2138.4 mL    01-06-20 @ 07:01  -  01-06-20 @ 16:44  --------------------------------------------------------  IN: 920 mL / OUT: 1250 mL / NET: -330 mL    PHYSICAL EXAM  --------------------------------------------------------------------------------  	Gen: guarded but stable, A&Ox3, NC O2  	HEENT: NC/AT, PERRL, supple neck, clear oropharynx, dried ruptured blisters  	GI: (+) BS, softly distended, non tender                   midline incision dressing  c/d/i w/o drainage                   (+)ostomy pink viable- thick bilious liquid stool like material              MSK: FROM, no contractures nor deformities  	Vascular: Equally Warm, no clubbing, cyanosis,                        RUE edema w/o cord, tenderness, induration, erythema, fluctuance, drainage, nor blistering                       Rt PICC dressing c/d/I   	Neuro: No focal deficits, intact sensation, weakened strength  	Psych: Normal affect and mood  	Skin: Warm, without rashes, good turgor          LABS/ CULTURES/ RADIOLOGY:              7.8    14.03 >-----------<  253      [01-06-20 @ 02:44]              25.7     137  |  98  |  14  ----------------------------<  120      [01-06-20 @ 02:44]  4.3   |  29  |  0.44        Ca     7.5     [01-06-20 @ 02:44]      Mg     1.8     [01-06-20 @ 02:44]      Phos  3.2     [01-06-20 @ 02:44]    TPro  5.1  /  Alb  2.1  /  TBili  0.6  /  DBili  0.3  /  AST  12  /  ALT  15  /  AlkPhos  74  [01-06-20 @ 02:44]      CAPILLARY BLOOD GLUCOSE  POCT Blood Glucose.: 106 mg/dL (06 Jan 2020 12:40)  POCT Blood Glucose.: 126 mg/dL (06 Jan 2020 08:32)  POCT Blood Glucose.: 128 mg/dL (05 Jan 2020 22:01)    Prealbumin, Serum: 14 mg/dL (01-06-20 @ 07:40)  Prealbumin, Serum: 16 mg/dL (01-01-20 @ 02:48)  Prealbumin, Serum: 15 mg/dL (12-31-19 @ 03:11)  Prealbumin, Serum: 16 mg/dL (12-30-19 @ 07:53)  Prealbumin, Serum: 13 mg/dL (12-25-19 @ 04:24)    Triglycerides, Serum: 48 mg/dL (01.06.20 @ 02:44)  Triglycerides, Serum: 56 mg/dL (01.01.20 @ 00:42)  Triglycerides, Serum: 39 mg/dL (12.31.19 @ 00:56)

## 2020-01-06 NOTE — PROGRESS NOTE ADULT - ASSESSMENT
Assessment	  73 y/o M presenting with septic shock and high grade SBO s/p exploratory laparotomy, lysis of adhesions, decompression of bowel via enterotomy w/ primary repair, and Abthera VAC placement on 12/6; s/p take down of Abthera, washout and re-application of the Abthera vac on 12/8. 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 s/p L chest tube placement by IR 12/30 for pleural effusion.     Recommendations:  - c/w dysphagia diet per speech and swallow , TPN  - monitor chest tube output - f/u CTSx for possible pleurodesis for ongoing output   - c/w abx for wound infection  - cont tpn, monitor ostomy output  - no surgical intervention at this time for high ostomy output - too early for reversal of ileostomy. Continue repletions, now 0.5:1  - continue excellent supportive care per SICU    RED SURGERY  [id number]

## 2020-01-06 NOTE — CHART NOTE - NSCHARTNOTEFT_GEN_A_CORE
Nutrition Follow Up Note    Patient seen for: malnutrition/TPN follow up on 8ICU and Calorie Count results 1/3-.    Source: medical record, TPN Team rounds    Chart reviewed, events noted. "75 y/o M presenting with septic shock and high grade SBO s/p exploratory laparotomy, lysis of adhesions, decompression of bowel via enterotomy w/ primary repair, and Abthera VAC placement on ; s/p take down of Abthera, washout and re-application of the Abthera vac on . Now s/p SBR and end ileostomy on 12/10."    Nutrition Status: Severe Malnutrition. Pt NPO > 7 days S/P GI surgery x 3; 150 cm small bowel remaining. Pt extubated . TPN initiated . Pt has been tolerating DYS1NC diet since ; unable to advance per SLP eval . Pt with ongoing high ileostomy output, now downtrending, with 0.5:1 fluid repletions.    CALORIE COUNT 1/3 -  MEETING 100% OF CALORIE NEEDS AND 55-65 % OF LOWEST ESTIMATED PROTEIN NEEDS  DAY 1: 1749 calories, 64 grams protein  DAY 2: 1715 calories, 55 grams protein  DAY 3:  1058 calories, 34 grams protein (? accuracy/completeness of calories count sheet)    Diet (): DYS1NC with 3 servings [place] Consistency Mighty Shakes (200 calories, 6 Gm protein per serving). Two servings Ensure Pudding (170cal, 4Gm prot per serving) added .    Parenteral Nutrition: TPN (ordered ) 1.8L infusing at 75ml/hr (120Gm amino acids, 210Gm dextrose, 50Gm SMOF lipids) to provide 1694cal/day (31cal/Kg and 2.2Gm protein/Kg per dosing wt 54.2Kg); with 10ml MVI, 3ml MTE-5. Insulin removed from TPN.    Non-Protein Calories: 1214 gregorio/day (22 gregorio/Kg)  Dextrose Infusion Rate: 2.7 mg/Kg/min  Lipid Infusion Rate: 0.92 Gm/Kg/day; 0.08 Gm/Kg/hr     Ileostomy output: 1900ml (), 2225ml (), 3350ml (), 3500ml (1/3); Rx noted for Lomotil, Loperamide, Octreotide, opium tincture.     Daily Weight in k.9 (), Weight in k.3 (), Weight in k.8 (), Weight in k.3 (), Weight in k.7 (), Weight in k.3 ()    Drug Dosing Weight  Weight (kg): 54.2 (06 Dec 2019 22:12)  BMI (kg/m2): 17.6 (09 Dec 2019 09:00)    Pertinent Medications: MEDICATIONS  (STANDING):  albuterol/ipratropium for Nebulization 3 milliLiter(s) Nebulizer every 6 hours  buDESOnide    Inhalation Suspension 0.5 milliGRAM(s) Inhalation every 12 hours  chlorhexidine 2% Cloths 1 Application(s) Topical <User Schedule>  diphenoxylate/atropine 2 Tablet(s) Oral <User Schedule>  enoxaparin Injectable 40 milliGRAM(s) SubCutaneous daily  fat emulsion (Fish Oil and Plant Based) 20% Infusion 20.8 mL/Hr (20.8 mL/Hr) IV Continuous <Continuous>  insulin lispro (HumaLOG) corrective regimen sliding scale   SubCutaneous three times a day before meals  insulin lispro (HumaLOG) corrective regimen sliding scale   SubCutaneous at bedtime  loperamide 16 milliGRAM(s) Oral <User Schedule>  octreotide  Injectable 100 MICROGram(s) SubCutaneous three times a day  opium Tincture 6 milliGRAM(s) Oral <User Schedule>  pantoprazole  Injectable 40 milliGRAM(s) IV Push daily  Parenteral Nutrition - Adult 1 Each (75 mL/Hr) TPN Continuous <Continuous>  Parenteral Nutrition - Adult 1 Each (75 mL/Hr) TPN Continuous <Continuous>  sodium chloride 0.9%. 1000 milliLiter(s) (10 mL/Hr) IV Continuous <Continuous>    MEDICATIONS  (PRN):  ondansetron Injectable 4 milliGRAM(s) IV Push every 6 hours PRN Nausea and/or Vomiting    LABS:    @ 07:40: Prealbumin 14<L>   @ 02:44: Sodium 137, Potassium 4.3, Chloride 98, Calcium 7.5<L>, Magnesium 1.8, Phosphorus 3.2, BUN 14, Creatinine 0.44<L>, <H>, Alk Phos 74, ALT/SGPT 15, AST/SGOT 12, Total Protein 5.1<L>, Albumin 2.1<L>, Total Bilirubin 0.6, Direct Bilirubin 0.3<H>, Hemoglobin 7.8<L>, Hematocrit 25.7<L>    POCT Blood Glucose.: 106 mg/dL (2020 12:40)  POCT Blood Glucose.: 126 mg/dL (2020 08:32)  POCT Blood Glucose.: 128 mg/dL (2020 22:01)  POCT Blood Glucose.: 120 mg/dL (2020 16:35)    Skin per nursing documentation: no pressure injuries noted  Edema: 2+ generalized, 3+ right arm    Estimated Needs: based on dosing wt 54.2Kg, with consideration for TPN, malnutrition  4444-6533 gregorio/day (25-30cal/Kg)   Gm protein/day (1.8-2.2Gm/Kg)     Previous Nutrition Diagnosis: Severe malnutrition  Nutrition Diagnosis is: ongoing, being addressed with TPN, diet advancement, and oral supplements    New Nutrition Diagnosis: none     Interventions: consider reducing/discontinuing TPN; po intake meeting 75% of needs    Recommend  1) Continue DYS1NC diet  2) Continue 3 servings [place] Consistency Mighty Shakes and 2 servings Ensure Pudding   3) TPN per TPN Team/Nutrition assessment; recommend reducing TPN provision to half in setting of improved po intake    Monitoring and Evaluation:     Continue to monitor nutritional intake, tolerance to diet prescription, weights, labs, skin integrity.    RD remains available upon request and will follow up per protocol.    Sowmya Graham MS RD CDN [facility], Pager # [phone]. Nutrition Follow Up Note    Patient seen for: malnutrition/TPN follow up on 8ICU and Calorie Count results 1/3-.    Source: medical record, TPN Team rounds    Chart reviewed, events noted. "75 y/o M presenting with septic shock and high grade SBO s/p exploratory laparotomy, lysis of adhesions, decompression of bowel via enterotomy w/ primary repair, and Abthera VAC placement on ; s/p take down of Abthera, washout and re-application of the Abthera vac on . Now s/p SBR and end ileostomy on 12/10."    Nutrition Status: Severe Malnutrition. Pt NPO > 7 days S/P GI surgery x 3; 150 cm small bowel remaining. Pt extubated . TPN initiated . Pt has been tolerating DYS1NC diet since ; unable to advance per SLP eval . Pt with ongoing high ileostomy output, now downtrending, with 0.5:1 fluid repletions.    CALORIE COUNT (1/3 - ) MEETING 100% OF CALORIE NEEDS AND 55-65 % OF LOWEST ESTIMATED PROTEIN NEEDS  DAY 1: 1749 calories, 64 grams protein  DAY 2: 1715 calories, 55 grams protein  DAY 3:  1058 calories, 34 grams protein (? accuracy/completeness of calories count sheet)    Diet (): DYS1NC with 3 servings [place] Consistency Mighty Shakes (200 calories, 6 Gm protein per serving). Two servings Ensure Pudding (170cal, 4Gm prot per serving) added .    Parenteral Nutrition: TPN (ordered ) 1.8L infusing at 75ml/hr (120Gm amino acids, 210Gm dextrose, 50Gm SMOF lipids) to provide 1694cal/day (31cal/Kg and 2.2Gm protein/Kg per dosing wt 54.2Kg); with 10ml MVI, 3ml MTE-5. Insulin removed from TPN.    Non-Protein Calories: 1214 gregorio/day (22 gregorio/Kg)  Dextrose Infusion Rate: 2.7 mg/Kg/min  Lipid Infusion Rate: 0.92 Gm/Kg/day; 0.08 Gm/Kg/hr     Ileostomy output: 1900ml (), 2225ml (), 3350ml (), 3500ml (1/3); Rx noted for Lomotil, Loperamide, Octreotide, opium tincture.     Daily Weight in k.9 (), Weight in k.3 (), Weight in k.8 (), Weight in k.3 (), Weight in k.7 (), Weight in k.3 ()    Drug Dosing Weight  Weight (kg): 54.2 (06 Dec 2019 22:12)  BMI (kg/m2): 17.6 (09 Dec 2019 09:00)    Pertinent Medications: MEDICATIONS  (STANDING):  albuterol/ipratropium for Nebulization 3 milliLiter(s) Nebulizer every 6 hours  buDESOnide    Inhalation Suspension 0.5 milliGRAM(s) Inhalation every 12 hours  chlorhexidine 2% Cloths 1 Application(s) Topical <User Schedule>  diphenoxylate/atropine 2 Tablet(s) Oral <User Schedule>  enoxaparin Injectable 40 milliGRAM(s) SubCutaneous daily  fat emulsion (Fish Oil and Plant Based) 20% Infusion 20.8 mL/Hr (20.8 mL/Hr) IV Continuous <Continuous>  insulin lispro (HumaLOG) corrective regimen sliding scale   SubCutaneous three times a day before meals  insulin lispro (HumaLOG) corrective regimen sliding scale   SubCutaneous at bedtime  loperamide 16 milliGRAM(s) Oral <User Schedule>  octreotide  Injectable 100 MICROGram(s) SubCutaneous three times a day  opium Tincture 6 milliGRAM(s) Oral <User Schedule>  pantoprazole  Injectable 40 milliGRAM(s) IV Push daily  Parenteral Nutrition - Adult 1 Each (75 mL/Hr) TPN Continuous <Continuous>  Parenteral Nutrition - Adult 1 Each (75 mL/Hr) TPN Continuous <Continuous>  sodium chloride 0.9%. 1000 milliLiter(s) (10 mL/Hr) IV Continuous <Continuous>    MEDICATIONS  (PRN):  ondansetron Injectable 4 milliGRAM(s) IV Push every 6 hours PRN Nausea and/or Vomiting    LABS:    @ 07:40: Prealbumin 14<L>   @ 02:44: Sodium 137, Potassium 4.3, Chloride 98, Calcium 7.5<L>, Magnesium 1.8, Phosphorus 3.2, BUN 14, Creatinine 0.44<L>, <H>, Alk Phos 74, ALT/SGPT 15, AST/SGOT 12, Total Protein 5.1<L>, Albumin 2.1<L>, Total Bilirubin 0.6, Direct Bilirubin 0.3<H>, Hemoglobin 7.8<L>, Hematocrit 25.7<L>    POCT Blood Glucose.: 106 mg/dL (2020 12:40)  POCT Blood Glucose.: 126 mg/dL (2020 08:32)  POCT Blood Glucose.: 128 mg/dL (2020 22:01)  POCT Blood Glucose.: 120 mg/dL (2020 16:35)    Skin per nursing documentation: no pressure injuries noted  Edema: 2+ generalized, 3+ right arm    Estimated Needs: based on dosing wt 54.2Kg, with consideration for TPN, malnutrition  4646-1519 gregorio/day (25-30cal/Kg)   Gm protein/day (1.8-2.2Gm/Kg)     Previous Nutrition Diagnosis: Severe malnutrition  Nutrition Diagnosis is: ongoing, being addressed with TPN, diet advancement, and oral supplements    New Nutrition Diagnosis: none     Interventions: consider reducing/discontinuing TPN; po intake meeting 75% of needs    Recommend  1) Continue DYS1NC diet  2) Continue 3 servings [place] Consistency Mighty Shakes and 2 servings Ensure Pudding   3) TPN per TPN Team/Nutrition assessment; recommend reducing TPN provision to half in setting of improved po intake    Monitoring and Evaluation:     Continue to monitor nutritional intake, tolerance to diet prescription, weights, labs, skin integrity.    RD remains available upon request and will follow up per protocol.    Sowmya Graham MS RD CDN [facility], Pager # [phone].

## 2020-01-06 NOTE — PROGRESS NOTE ADULT - SUBJECTIVE AND OBJECTIVE BOX
GENERAL SURGERY PROGRESS NOTE    SUBJECTIVE  Patient seen and examined. No acute events overnight. Reports tolerating dysphagia diet without nausea, vomiting, +/+ ostomy 1.5L/24h, /24h  voiding without issues. Denies fever, chills, SOB, chest pain.         OBJECTIVE    PHYSICAL EXAM  General: Appears well, NAD  CHEST: breathing comfortably  CV: appears well perfused  Abdomen: soft, nontender, nondistended, no rebound or guarding, midline dressing changed very mild erythema with serous drainage upper portion of incision  Extremities: Grossly symmetric    T(C): 37 (01-06-20 @ 03:00), Max: 37.5 (01-05-20 @ 19:00)  HR: 84 (01-06-20 @ 07:00) (79 - 103)  BP: 111/59 (01-06-20 @ 07:00) (111/59 - 192/80)  RR: 21 (01-06-20 @ 07:00) (18 - 32)  SpO2: 94% (01-06-20 @ 07:00) (81% - 99%)    01-05-20 @ 07:01  -  01-06-20 @ 07:00  --------------------------------------------------------  IN: 3211.6 mL / OUT: 5350 mL / NET: -2138.4 mL        MEDICATIONS  albuterol/ipratropium for Nebulization 3 milliLiter(s) Nebulizer every 6 hours  buDESOnide    Inhalation Suspension 0.5 milliGRAM(s) Inhalation every 12 hours  chlorhexidine 2% Cloths 1 Application(s) Topical <User Schedule>  diphenoxylate/atropine 2 Tablet(s) Oral <User Schedule>  enoxaparin Injectable 40 milliGRAM(s) SubCutaneous daily  fat emulsion (Fish Oil and Plant Based) 20% Infusion 20.8 mL/Hr IV Continuous <Continuous>  insulin lispro (HumaLOG) corrective regimen sliding scale   SubCutaneous three times a day before meals  insulin lispro (HumaLOG) corrective regimen sliding scale   SubCutaneous at bedtime  loperamide 16 milliGRAM(s) Oral <User Schedule>  octreotide  Injectable 100 MICROGram(s) SubCutaneous three times a day  ondansetron Injectable 4 milliGRAM(s) IV Push every 6 hours PRN  opium Tincture 6 milliGRAM(s) Oral <User Schedule>  pantoprazole  Injectable 40 milliGRAM(s) IV Push daily  Parenteral Nutrition - Adult 1 Each TPN Continuous <Continuous>  sodium chloride 0.9%. 1000 milliLiter(s) IV Continuous <Continuous>      LABS                        7.8    14.03 )-----------( 253      ( 06 Jan 2020 02:44 )             25.7     01-06    137  |  98  |  14  ----------------------------<  120<H>  4.3   |  29  |  0.44<L>    Ca    7.5<L>      06 Jan 2020 02:44  Phos  3.2     01-06  Mg     1.8     01-06    TPro  5.1<L>  /  Alb  2.1<L>  /  TBili  0.6  /  DBili  0.3<H>  /  AST  12  /  ALT  15  /  AlkPhos  74  01-06          RADIOLOGY & ADDITIONAL STUDIES

## 2020-01-06 NOTE — PROGRESS NOTE ADULT - ASSESSMENT
A/P: 74 year old male w/ PMH of COPD and EtOH dependence with PSH/o appy, prostate surgery, & spine surgery  septic shock and high grade SBO s/p exploratory laparotomy, lysis of adhesions, decompression of bowel via enterotomy w/ primary repair, and Abthera VAC placement on 12/6; s/p take down of Abthera, washout and re-application of the Abthera vac on 12/8. Now s/p SBR and end ileostomy on 12/10.   TPN consulted to assist w/ management of pt's nutrition in pt w/ prolonged hospital course now tolerating diet but has high Ileostomy output.  Pt s/p Lt Chest Pigtail for effusion.    Continue TPN at full strength in pt w/ severe Protein-Calorie Malnutrition      -  high ostomy output- pt eating more regularly- continue to monitor      - Tolerating Dysphagia 1 Pureed-Nectar Consistency Fluid w/ Protein Supplements      - Plan to continue TPN & octreotide, lomotil, tincture of opium, & imodium  Pt tolerating TPN at GOAL:  120 grams amino acids (800ml 15% a.a.)  210 grams dextrose (300ml 70% dex)  50 grams SMOFlipid (250ml 20%IVFE @ 20.8ml/hr x 12 hours)  in 1800mL volume  Micronutrients: 10ml MVI, 3ml MTE-5    HypoNa  improving & will continue to monitor-maintaining K levels will help improve Na  Elevated K -improving, will keep KCl of 40 mEq KCl in TPN          possibly due to Octreotide  HypoPhos resolving, continue NaPhos of 45mMol   Strict Intake and Output-       high ileostomy output - greatly improved, continue to monitor as pt eating more consistently        pt now on increased lomotil, imodium, tincture of opium, & Octreotide   Fecal fat testing- specimen collection- 72hr fecal studies        electrolytes & fat content to be assessed -see what pt is absorbing        to be better able to assess ongoing nutritional needs in pt with high ostomy output that is concerning             for Short Gut Syndrome   Hyperglycemia - improving - Insulin removed from TPN      Fingersticks & ISS coverage - to be ordered by primary team  RUE swelling - arm elevation, monitoring , consider DUplex  IR placed pigtail Lt chest effusion- still high drainage, continue to monitor as per SICU/ IR/ CTS  PICC w/dedicated port for only TPN - maintenance as per protocol  Weights three times a week  Monitor BMP, Mg, Ionized Ca, Phosphorus daily  Continue to monitor Triglycerides and Pre-albumin weekly  Continue as per SICU / Surgery, will follow with you, D/w primary team    Andreina Hubbard PA-C  TPN team, pager [phone]  D/w Ana M Siegel

## 2020-01-06 NOTE — PROGRESS NOTE ADULT - SUBJECTIVE AND OBJECTIVE BOX
Subjective: Patient seen and examined. No new events except as noted.   Remains in ICU   feels ok   no cp or sob       REVIEW OF SYSTEMS:    CONSTITUTIONAL: + weakness, fevers or chills  EYES/ENT: No visual changes;  No vertigo or throat pain   NECK: No pain or stiffness  RESPIRATORY: No cough, wheezing, hemoptysis; No shortness of breath  CARDIOVASCULAR: No chest pain or palpitations  GASTROINTESTINAL: No abdominal or epigastric pain. No nausea, vomiting, or hematemesis; No diarrhea or constipation. No melena or hematochezia.  GENITOURINARY: No dysuria, frequency or hematuria  NEUROLOGICAL: No numbness or weakness  SKIN: No itching, burning, rashes, or lesions   All other review of systems is negative unless indicated above.    MEDICATIONS:  MEDICATIONS  (STANDING):  albuterol/ipratropium for Nebulization 3 milliLiter(s) Nebulizer every 6 hours  buDESOnide    Inhalation Suspension 0.5 milliGRAM(s) Inhalation every 12 hours  chlorhexidine 2% Cloths 1 Application(s) Topical <User Schedule>  diphenoxylate/atropine 2 Tablet(s) Oral <User Schedule>  enoxaparin Injectable 40 milliGRAM(s) SubCutaneous daily  fat emulsion (Fish Oil and Plant Based) 20% Infusion 20.8 mL/Hr (20.8 mL/Hr) IV Continuous <Continuous>  insulin lispro (HumaLOG) corrective regimen sliding scale   SubCutaneous three times a day before meals  insulin lispro (HumaLOG) corrective regimen sliding scale   SubCutaneous at bedtime  loperamide 16 milliGRAM(s) Oral <User Schedule>  octreotide  Injectable 100 MICROGram(s) SubCutaneous three times a day  opium Tincture 6 milliGRAM(s) Oral <User Schedule>  pantoprazole  Injectable 40 milliGRAM(s) IV Push daily  Parenteral Nutrition - Adult 1 Each (75 mL/Hr) TPN Continuous <Continuous>  sodium chloride 0.9%. 1000 milliLiter(s) (10 mL/Hr) IV Continuous <Continuous>      PHYSICAL EXAM:  T(C): 36.8 (01-06-20 @ 15:00), Max: 37.5 (01-05-20 @ 23:00)  HR: 92 (01-06-20 @ 19:00) (79 - 93)  BP: 90/53 (01-06-20 @ 19:00) (84/47 - 137/64)  RR: 24 (01-06-20 @ 19:00) (18 - 39)  SpO2: 95% (01-06-20 @ 19:00) (92% - 100%)  Wt(kg): --  I&O's Summary    05 Jan 2020 07:01  -  06 Jan 2020 07:00  --------------------------------------------------------  IN: 3211.6 mL / OUT: 5350 mL / NET: -2138.4 mL    06 Jan 2020 07:01  -  06 Jan 2020 19:19  --------------------------------------------------------  IN: 2342.9 mL / OUT: 3050 mL / NET: -707.1 mL          Appearance: NAD   HEENT:   Dry oral mucosa, crusted lips   Lymphatic: No lymphadenopathy   Cardiovascular: Normal S1 S2, No JVD, No murmurs , Peripheral pulses palpable 2+ bilaterally  Respiratory: Decreased bs   Gastrointestinal:  Soft, NT, ND. Ostomy pink with output. Midline staples in place, midline incision site is c/d/i   Skin: No rashes, No ecchymoses, No cyanosis, warm to touch  Musculoskeletal: Decreased  range of motion and strength  Psychiatry:  mildly sedated   Ext: No edema +PICC line   +rosas   +rectal tube         LABS:    CARDIAC MARKERS:                                7.8    14.03 )-----------( 253      ( 06 Jan 2020 02:44 )             25.7     01-06    137  |  98  |  14  ----------------------------<  120<H>  4.3   |  29  |  0.44<L>    Ca    7.5<L>      06 Jan 2020 02:44  Phos  3.2     01-06  Mg     1.8     01-06    TPro  5.1<L>  /  Alb  2.1<L>  /  TBili  0.6  /  DBili  0.3<H>  /  AST  12  /  ALT  15  /  AlkPhos  74  01-06    proBNP:   Lipid Profile:   HgA1c:   TSH:             TELEMETRY: 	 SR  ECG:  	  RADIOLOGY:   < from: Xray Chest 1 View- PORTABLE-Routine (01.06.20 @ 06:49) >    EXAM:  XR CHEST PORTABLE ROUTINE 1V                            PROCEDURE DATE:  01/06/2020            INTERPRETATION:  A single chest x-ray was obtained on January 6, 2020.    Indication: Position of left pigtail catheter.    Impression:    The heart is normal in size. The left pigtail catheter is in place. No pneumothorax. Small right pleural effusion. Mild pulmonary vascular congestion. A PICC catheter is seen on the right and the tip is in the superior vena cava.                     TRUDI DIETZ M.D., ATTENDING RADIOLOGIST  This document has been electronically signed. Jan 6 2020  8:24AM                < end of copied text >    DIAGNOSTIC TESTING:  [ ] Echocardiogram:  [ ]  Catheterization:  [ ] Stress Test:    OTHER:

## 2020-01-06 NOTE — PROGRESS NOTE ADULT - SUBJECTIVE AND OBJECTIVE BOX
VITAL SIGNS      Vital Signs Last 24 Hrs  T(C): 36.6 (20 @ 07:00), Max: 37.5 (20 @ 19:00)  T(F): 97.9 (20 @ 07:00), Max: 99.5 (20 @ 19:00)  HR: 85 (20 @ 11:13) (79 - 103)  BP: 123/60 (20 @ 10:00) (111/59 - 192/80)  RR: 27 (20 @ 10:00) (18 - 32)  SpO2: 99% (20 @ 11:13) (86% - 100%)                   Daily     Daily Weight in k.9 (2020 01:05)      Bilirubin Direct, Serum: 0.3 mg/dL ( @ 02:44)  Bilirubin Total, Serum: 0.6 mg/dL ( @ 02:44)    CAPILLARY BLOOD GLUCOSE      POCT Blood Glucose.: 126 mg/dL (2020 08:32)  POCT Blood Glucose.: 128 mg/dL (2020 22:01)  POCT Blood Glucose.: 120 mg/dL (2020 16:35)  POCT Blood Glucose.: 122 mg/dL (2020 11:38)                          PHYSICAL EXAM  s  No SOB  No fever chills"  Neurology: alert and oriented x 3, moves all extremities with no defecits  CV :  RRR  Lungs:   CTA B/L  Abdomen: soft, nontender, nondistended, positive bowel sounds,  Extremities:       cachetic   no edema

## 2020-01-06 NOTE — PROGRESS NOTE ADULT - ASSESSMENT
73 y/o M presenting with septic shock and high grade SBO s/p exploratory laparotomy, lysis of adhesions, decompression of bowel via enterotomy w/ primary repair, and Abthera VAC placement on 12/6; s/p take down of Abthera, washout and re-application of the Abthera vac on 12/8. Now s/p SBR and end ileostomy on 12/10 acute respiratory distress now improving, Pneumothorax, hyperglycemia, delirium. Now still with persistent pneumothorax when chest pigtail clamped.   12/23 VSS on room air recommending IR drainage of left chest  12/24 No evidence of air leak to left chest tube. Tube clamped. Repeat chest cxr in 4 hours. Anticipate chest tube removal if lung remains expanded. Drainage of loculated left effusion discussed with IR. IR to reconsult for drainage once initial tube removed. Discussed plan with Dr Mayes and IR Fellow  12/24  On NC ,    VSSchest xray sm lt pl eff, left chest tube dc'd.  12/26    2l NC   co  sob,  lt side diminshed  12/27 VSS, CXR with unchanged loculated left pleural effusion- IR consulted for pigtail placement, states effusion can be drained via thoracentesis, pigtail placement not indicated at this time - Dr. Mayes to speak with IR attending.   12/30 Patient with persistent PTX  and left pleural effusion now for IR drainage with Dr Elkins.   12/31 HD stable, L PTC w/ high output, serosanguinous drainage, 990 ml overnight/ 2200 in 24 hrs. Tolerating 2L NC supplemental O2, Cyto pending, continue monitor drainage output.   1/1: LPTC still with High output-400/24h. Continue with pigtail cath drain.  1/ 2     lt pigtail draining  on lws  1/3 HD stable, left pigtail catheter continues to drain. Incentive spirometer encouraged, OOB and mobility, continue pulmonary toileting. Maintain chest tube to suction and monitor output.   1/4: Chest tube milked to prevent clogging of tube, Continues to drain with high out put.  1/5 remains with high out put 170/700. Continue drainage  1/5  persistent drainage from lt pl tube

## 2020-01-06 NOTE — PROGRESS NOTE ADULT - SUBJECTIVE AND OBJECTIVE BOX
HISTORY:  75 y/o male with a past medical history of COPD and EtOH dependence who presented on 12/6/2019 with abdominal pain, nausea, hematemesis, and poor PO intake for ~2-3 days. In the ED, he was found to be hypotensive & tachycardic. Labs revealed an CHI and lactic acidosis. Imaging revealed a high grade SBO in the RLQ on CT scan. Patient was taken to the OR emergently for an exploratory laparotomy, lysis of adhesions, decompression of bowel via enterotomy w/ primary repair, and Abthera VAC placement. The distal 50% of the bowel appeared dusky but viable at that time. He was requiring vasopressor support and was left intubated at the end of the case so patient was admitted to SICU post-operatively. He was taken back to the OR on 12/8/2019 for re-exploration. The proximal 165 cm of small bowel appeared pink & viable but beyond that had patchy areas of ischemia. Decision was made to give the bowel more time to demarcate before resecting in order to preserve as much small bowel as possible so Abthera VAC was replaced. He returned to the OR on 12/10/2019 for re-exploration, small bowel resection of 150 cm (150 cm remaining), ileocecetomy, end ileostomy, mucous fistula, and abdominal closure. He was extubated on 12/11/2019. Hospital course has been complicated by SVT, short bowel syndrome requiring repletions w/ IV fluids, malnutrition requiring TPN, intermittent episodes of hypotension, spontaneous left pneumothorax s/p pigtail catheter (12/15-12/24), left pleural effusion s/p pigtail catheter w/ IR (12/30), dysphagia, and oral HSV lesions.    24 HOUR EVENTS: Changed ostomy output repletions from 1:1 to 0.5:1 with normal saline HISTORY:  73 y/o male with a past medical history of COPD and EtOH dependence who presented on 12/6/2019 with abdominal pain, nausea, hematemesis, and poor PO intake for ~2-3 days. In the ED, he was found to be hypotensive & tachycardic. Labs revealed an CHI and lactic acidosis. Imaging revealed a high grade SBO in the RLQ on CT scan. Patient was taken to the OR emergently for an exploratory laparotomy, lysis of adhesions, decompression of bowel via enterotomy w/ primary repair, and Abthera VAC placement. The distal 50% of the bowel appeared dusky but viable at that time. He was requiring vasopressor support and was left intubated at the end of the case so patient was admitted to SICU post-operatively. He was taken back to the OR on 12/8/2019 for re-exploration. The proximal 165 cm of small bowel appeared pink & viable but beyond that had patchy areas of ischemia. Decision was made to give the bowel more time to demarcate before resecting in order to preserve as much small bowel as possible so Abthera VAC was replaced. He returned to the OR on 12/10/2019 for re-exploration, small bowel resection of 150 cm (150 cm remaining), ileocecetomy, end ileostomy, mucous fistula, and abdominal closure. He was extubated on 12/11/2019. Hospital course has been complicated by SVT, short bowel syndrome requiring repletions w/ IV fluids, malnutrition requiring TPN, intermittent episodes of hypotension, spontaneous left pneumothorax s/p pigtail catheter (12/15-12/24), left pleural effusion s/p pigtail catheter w/ IR (12/30), dysphagia, and oral HSV lesions.    24 HOUR EVENTS: Changed ostomy output repletions from 1:1 to 0.5:1 with normal saline    SUBJECTIVE/ROS:  [x] A ten-point review of systems was otherwise negative except as noted.  [ ] Due to altered mental status/intubation, subjective information were not able to be obtained from the patient. History was obtained, to the extent possible, from review of the chart and collateral sources of information.    NEURO  Exam: awake, alert, oriented x4, no acute distress, no focal deficits  Meds: none  [x] Adequacy of sedation and pain control has been assessed and adjusted    RESPIRATORY  RR: 18 (01-06-20 @ 03:00) (18 - 32)  SpO2: 97% (01-06-20 @ 03:00) (81% - 99%)  Exam: clear to auscultation bilaterally  Mechanical Ventilation: no  [N/A] Extubation Readiness Assessed  Meds:  - albuterol/ipratropium for Nebulization 3 milliLiter(s) Nebulizer every 6 hours  - buDESOnide    Inhalation Suspension 0.5 milliGRAM(s) Inhalation every 12 hours    CARDIOVASCULAR  HR: 79 (01-06-20 @ 03:00) (79 - 103)  BP: 122/59 (01-06-20 @ 03:00) (115/60 - 192/80)  BP(mean): 84 (01-06-20 @ 03:00) (81 - 128)  Exam: regular rate and rhythm, S1S2  Cardiac Rhythm: sinus  Perfusion    [x]Adequate    [ ]Inadequate  Mentation   [x]Normal       [ ]Reduced  Extremities  [x]Warm         [ ]Cool  Volume Status [ ]Hypervolemic [x]Euvolemic [ ]Hypovolemic  Meds: none    GI/NUTRITION  Exam: soft, nontender, nondistended, surgical incision healing well, ileostomy pink & viable w/ bilious output  Diet: dysphagia 1 pureed with nectar consistency fluid  Meds:  - diphenoxylate/atropine 2 Tablet(s) Oral <User Schedule>  - fat emulsion (Fish Oil and Plant Based) 20% Infusion 20.8 mL/Hr IV Continuous <Continuous>  - loperamide 16 milliGRAM(s) Oral <User Schedule>  - octreotide  Injectable 100 MICROGram(s) SubCutaneous three times a day  - ondansetron Injectable 4 milliGRAM(s) IV Push every 6 hours PRN Nausea and/or Vomiting  - opium Tincture 6 milliGRAM(s) Oral <User Schedule>  - pantoprazole  Injectable 40 milliGRAM(s) IV Push daily  - Parenteral Nutrition - Adult 1 Each TPN Continuous <Continuous>    GENITOURINARY  I&O's Detail  01-05 @ 07:01  -  01-06 @ 04:42  --------------------------------------------------------  IN:    fat emulsion (Fish Oil and Plant Based) 20% Infusion: 218.4 mL    Oral Fluid: 120 mL    sodium chloride 0.9%: 40 mL    sodium chloride 0.9%.: 160 mL    Solution: 750 mL    TPN (Total Parenteral Nutrition): 1500 mL  Total IN: 2788.4 mL    OUT:    Chest Tube: 200 mL    Ileostomy: 1500 mL    Voided: 1950 mL  Total OUT: 3650 mL    Total NET: -861.6 mL    137  |  98  |  14  ----------------------------<  120<H>  4.3   |  29  |  0.44<L>    Ca    7.5<L>      06 Jan 2020 02:44  Phos  3.2  Mg     1.8  TPro  5.1<L>  /  Alb  2.1<L>  /  TBili  0.6  /  DBili  0.3<H>  /  AST  12  /  ALT  15  /  AlkPhos  74    [ ] Martinez catheter, indication:   Meds: sodium chloride 0.9% for 0.5:1 ileostomy output repletions    HEMATOLOGIC  Meds: enoxaparin Injectable 40 milliGRAM(s) SubCutaneous daily  [x] VTE Prophylaxis                        7.8    14.03 )-----------( 253      ( 06 Jan 2020 02:44 )             25.7     INFECTIOUS DISEASES  T(C): 37 (01-06-20 @ 03:00), Max: 37.5 (01-05-20 @ 19:00)  WBC Count: 14.03 K/uL (01-06 @ 02:44)  Recent Cultures:  - Specimen Source: .Blood Blood, 12-30 @ 22:09  Results: No growth at 5 days.  Gram Stain: --  Organism: --  - Specimen Source: .Body Fluid Pleural Fluid, 12-30 @ 19:16  Results: No growth at 5 days  Gram Stain: No polymorphonuclear cells seen. No organisms seen by cytocentrifuge  Organism: --  Meds: none    ENDOCRINE  Capillary Blood Glucose:  - 128 mg/dL (05 Jan 2020 22:01)  - 120 mg/dL (05 Jan 2020 16:35)  - 122 mg/dL (05 Jan 2020 11:38)  - 131 mg/dL (05 Jan 2020 08:10)  Meds:  - insulin lispro (HumaLOG) corrective regimen sliding scale   SubCutaneous three times a day before meals  - insulin lispro (HumaLOG) corrective regimen sliding scale   SubCutaneous at bedtime    ACCESS DEVICES:  [x] Peripheral IV  [ ] Central Venous Line	[ ] R	[ ] L	[ ] IJ	[ ] Fem	[ ] SC	Placed:   [ ] Arterial Line		[ ] R	[ ] L	[ ] Fem	[ ] Rad	[ ] Ax	Placed:   [x] PICC:	RUE placed on 12/14				[ ] Mediport  [ ] Urinary Catheter, Date Placed:   [x] Necessity of urinary, arterial, and venous catheters discussed    OTHER MEDICATIONS: chlorhexidine 2% Cloths 1 Application(s) Topical <User Schedule>    CODE STATUS: Full code    IMAGING:

## 2020-01-06 NOTE — PROGRESS NOTE ADULT - ATTENDING COMMENTS
Accurate measurement of I/O daily weight, adjustment of IVF accordingly, on multiple prokinetics  Add extra protein supplement  TPN  Will likely need pleurax catheter for high CT output, pleurodesis may not work  ISS  PT

## 2020-01-06 NOTE — PROGRESS NOTE ADULT - SUBJECTIVE AND OBJECTIVE BOX
Interventional Radiology Follow- Up Note    S/P left pigtail chest tube on 12/30/19  in Interventional Radiology with Dr. James. Pt with 900cc/24hr output.       on !L NC. Denies SOB, cough, chest pain.     Vitals: T(F): 97.9 (01-06-20 @ 11:00), Max: 99.5 (01-05-20 @ 19:00)  HR: 85 (01-06-20 @ 15:00) (79 - 103)  BP: 84/47 (01-06-20 @ 15:00) (84/47 - 173/78)  RR: 22 (01-06-20 @ 15:00) (18 - 34)  SpO2: 98% (01-06-20 @ 15:00) (91% - 100%)  Wt(kg): --    LABS:                        7.8    14.03 )-----------( 253      ( 06 Jan 2020 02:44 )             25.7     01-06    137  |  98  |  14  ----------------------------<  120<H>  4.3   |  29  |  0.44<L>    Ca    7.5<L>      06 Jan 2020 02:44  Phos  3.2     01-06  Mg     1.8     01-06    TPro  5.1<L>  /  Alb  2.1<L>  /  TBili  0.6  /  DBili  0.3<H>  /  AST  12  /  ALT  15  /  AlkPhos  74  01-06      PHYSICAL EXAM:  General: Nontoxic, in NAD  Neuro:  Alert & oriented x 3  Drain site dressing c/d/i, draining serous fluid. Drained 900cc output.     Impression: 74y Male S/P left pigtail chest tube on 12/30/19.     Plan:  - Continue to monitor out put.  - Trend vitals, labs.  - Will discuss with IR attending.     Please call IR at extension [phone] with any questions, concerns, or issues regarding above.

## 2020-01-07 LAB
ALBUMIN SERPL ELPH-MCNC: 1.9 G/DL — LOW (ref 3.3–5)
ALP SERPL-CCNC: 66 U/L — SIGNIFICANT CHANGE UP (ref 40–120)
ALT FLD-CCNC: 14 U/L — SIGNIFICANT CHANGE UP (ref 10–45)
ANION GAP SERPL CALC-SCNC: 8 MMOL/L — SIGNIFICANT CHANGE UP (ref 5–17)
AST SERPL-CCNC: 11 U/L — SIGNIFICANT CHANGE UP (ref 10–40)
BILIRUB DIRECT SERPL-MCNC: 0.3 MG/DL — HIGH (ref 0–0.2)
BILIRUB INDIRECT FLD-MCNC: 0.3 MG/DL — SIGNIFICANT CHANGE UP (ref 0.2–1)
BILIRUB SERPL-MCNC: 0.6 MG/DL — SIGNIFICANT CHANGE UP (ref 0.2–1.2)
BUN SERPL-MCNC: 17 MG/DL — SIGNIFICANT CHANGE UP (ref 7–23)
CALCIUM SERPL-MCNC: 7.5 MG/DL — LOW (ref 8.4–10.5)
CHLORIDE SERPL-SCNC: 101 MMOL/L — SIGNIFICANT CHANGE UP (ref 96–108)
CO2 SERPL-SCNC: 28 MMOL/L — SIGNIFICANT CHANGE UP (ref 22–31)
CREAT SERPL-MCNC: 0.48 MG/DL — LOW (ref 0.5–1.3)
GLUCOSE BLDC GLUCOMTR-MCNC: 110 MG/DL — HIGH (ref 70–99)
GLUCOSE BLDC GLUCOMTR-MCNC: 121 MG/DL — HIGH (ref 70–99)
GLUCOSE BLDC GLUCOMTR-MCNC: 130 MG/DL — HIGH (ref 70–99)
GLUCOSE BLDC GLUCOMTR-MCNC: 151 MG/DL — HIGH (ref 70–99)
GLUCOSE SERPL-MCNC: 123 MG/DL — HIGH (ref 70–99)
HCT VFR BLD CALC: 24.1 % — LOW (ref 39–50)
HGB BLD-MCNC: 7.4 G/DL — LOW (ref 13–17)
MAGNESIUM SERPL-MCNC: 1.8 MG/DL — SIGNIFICANT CHANGE UP (ref 1.6–2.6)
MCHC RBC-ENTMCNC: 29.7 PG — SIGNIFICANT CHANGE UP (ref 27–34)
MCHC RBC-ENTMCNC: 30.7 GM/DL — LOW (ref 32–36)
MCV RBC AUTO: 96.8 FL — SIGNIFICANT CHANGE UP (ref 80–100)
NRBC # BLD: 0 /100 WBCS — SIGNIFICANT CHANGE UP (ref 0–0)
PHOSPHATE SERPL-MCNC: 3.8 MG/DL — SIGNIFICANT CHANGE UP (ref 2.5–4.5)
PLATELET # BLD AUTO: 239 K/UL — SIGNIFICANT CHANGE UP (ref 150–400)
POTASSIUM SERPL-MCNC: 4 MMOL/L — SIGNIFICANT CHANGE UP (ref 3.5–5.3)
POTASSIUM SERPL-SCNC: 4 MMOL/L — SIGNIFICANT CHANGE UP (ref 3.5–5.3)
PREALB SERPL-MCNC: 13 MG/DL — LOW (ref 20–40)
PROT SERPL-MCNC: 5 G/DL — LOW (ref 6–8.3)
RBC # BLD: 2.49 M/UL — LOW (ref 4.2–5.8)
RBC # FLD: 15.5 % — HIGH (ref 10.3–14.5)
SODIUM SERPL-SCNC: 137 MMOL/L — SIGNIFICANT CHANGE UP (ref 135–145)
TRIGL SERPL-MCNC: 50 MG/DL — SIGNIFICANT CHANGE UP (ref 10–149)
WBC # BLD: 12.41 K/UL — HIGH (ref 3.8–10.5)
WBC # FLD AUTO: 12.41 K/UL — HIGH (ref 3.8–10.5)

## 2020-01-07 PROCEDURE — 71045 X-RAY EXAM CHEST 1 VIEW: CPT | Mod: 26

## 2020-01-07 PROCEDURE — 99232 SBSQ HOSP IP/OBS MODERATE 35: CPT

## 2020-01-07 PROCEDURE — 99233 SBSQ HOSP IP/OBS HIGH 50: CPT

## 2020-01-07 RX ORDER — ELECTROLYTE SOLUTION,INJ
1 VIAL (ML) INTRAVENOUS
Refills: 0 | Status: DISCONTINUED | OUTPATIENT
Start: 2020-01-07 | End: 2020-01-07

## 2020-01-07 RX ORDER — MAGNESIUM SULFATE 500 MG/ML
2 VIAL (ML) INJECTION ONCE
Refills: 0 | Status: COMPLETED | OUTPATIENT
Start: 2020-01-07 | End: 2020-01-07

## 2020-01-07 RX ORDER — I.V. FAT EMULSION 20 G/100ML
20.8 EMULSION INTRAVENOUS
Qty: 50 | Refills: 0 | Status: DISCONTINUED | OUTPATIENT
Start: 2020-01-07 | End: 2020-01-08

## 2020-01-07 RX ADMIN — Medication 2 TABLET(S): at 23:33

## 2020-01-07 RX ADMIN — OCTREOTIDE ACETATE 100 MICROGRAM(S): 200 INJECTION, SOLUTION INTRAVENOUS; SUBCUTANEOUS at 14:03

## 2020-01-07 RX ADMIN — OCTREOTIDE ACETATE 100 MICROGRAM(S): 200 INJECTION, SOLUTION INTRAVENOUS; SUBCUTANEOUS at 05:00

## 2020-01-07 RX ADMIN — OCTREOTIDE ACETATE 100 MICROGRAM(S): 200 INJECTION, SOLUTION INTRAVENOUS; SUBCUTANEOUS at 23:34

## 2020-01-07 RX ADMIN — Medication 2 TABLET(S): at 08:39

## 2020-01-07 RX ADMIN — Medication 3 MILLILITER(S): at 19:28

## 2020-01-07 RX ADMIN — CHLORHEXIDINE GLUCONATE 1 APPLICATION(S): 213 SOLUTION TOPICAL at 05:01

## 2020-01-07 RX ADMIN — ENOXAPARIN SODIUM 40 MILLIGRAM(S): 100 INJECTION SUBCUTANEOUS at 11:20

## 2020-01-07 RX ADMIN — MORPHINE 6 MILLIGRAM(S): 10 SOLUTION ORAL at 13:23

## 2020-01-07 RX ADMIN — MORPHINE 6 MILLIGRAM(S): 10 SOLUTION ORAL at 08:38

## 2020-01-07 RX ADMIN — MORPHINE 6 MILLIGRAM(S): 10 SOLUTION ORAL at 19:33

## 2020-01-07 RX ADMIN — Medication 3 MILLILITER(S): at 23:39

## 2020-01-07 RX ADMIN — Medication 0.5 MILLIGRAM(S): at 19:28

## 2020-01-07 RX ADMIN — Medication 1 EACH: at 16:37

## 2020-01-07 RX ADMIN — Medication 16 MILLIGRAM(S): at 08:41

## 2020-01-07 RX ADMIN — PANTOPRAZOLE SODIUM 40 MILLIGRAM(S): 20 TABLET, DELAYED RELEASE ORAL at 11:20

## 2020-01-07 RX ADMIN — MORPHINE 6 MILLIGRAM(S): 10 SOLUTION ORAL at 23:49

## 2020-01-07 RX ADMIN — Medication 2 TABLET(S): at 13:23

## 2020-01-07 RX ADMIN — Medication 0.5 MILLIGRAM(S): at 05:31

## 2020-01-07 RX ADMIN — Medication 16 MILLIGRAM(S): at 19:29

## 2020-01-07 RX ADMIN — Medication 16 MILLIGRAM(S): at 13:24

## 2020-01-07 RX ADMIN — Medication 2 TABLET(S): at 19:30

## 2020-01-07 RX ADMIN — I.V. FAT EMULSION 20.8 ML/HR: 20 EMULSION INTRAVENOUS at 16:37

## 2020-01-07 RX ADMIN — Medication 16 MILLIGRAM(S): at 23:34

## 2020-01-07 RX ADMIN — Medication 3 MILLILITER(S): at 12:17

## 2020-01-07 RX ADMIN — Medication 3 MILLILITER(S): at 05:31

## 2020-01-07 RX ADMIN — Medication 50 GRAM(S): at 05:00

## 2020-01-07 NOTE — PROGRESS NOTE ADULT - SUBJECTIVE AND OBJECTIVE BOX
HISTORY:  73 y/o male with a past medical history of COPD and EtOH dependence who presented on 12/6/2019 with abdominal pain, nausea, hematemesis, and poor PO intake for ~2-3 days. In the ED, he was found to be hypotensive & tachycardic. Labs revealed an CHI and lactic acidosis. Imaging revealed a high grade SBO in the RLQ on CT scan. Patient was taken to the OR emergently for an exploratory laparotomy, lysis of adhesions, decompression of bowel via enterotomy w/ primary repair, and Abthera VAC placement. The distal 50% of the bowel appeared dusky but viable at that time. He was requiring vasopressor support and was left intubated at the end of the case so patient was admitted to SICU post-operatively. He was taken back to the OR on 12/8/2019 for re-exploration. The proximal 165 cm of small bowel appeared pink & viable but beyond that had patchy areas of ischemia. Decision was made to give the bowel more time to demarcate before resecting in order to preserve as much small bowel as possible so Abthera VAC was replaced. He returned to the OR on 12/10/2019 for re-exploration, small bowel resection of 150 cm (150 cm remaining), ileocecetomy, end ileostomy, mucous fistula, and abdominal closure. He was extubated on 12/11/2019. Hospital course has been complicated by SVT, short bowel syndrome requiring repletions w/ IV fluids, malnutrition requiring TPN, intermittent episodes of hypotension, spontaneous left pneumothorax s/p pigtail catheter (12/15-12/24), left pleural effusion s/p pigtail catheter w/ IR (12/30), dysphagia, and oral HSV lesions.    24 HOUR EVENTS:  - No active issues overnight  	    SUBJECTIVE/ROS:  [x] A ten-point review of systems was otherwise negative except as noted.  [ ] Due to altered mental status/intubation, subjective information were not able to be obtained from the patient. History was obtained, to the extent possible, from review of the chart and collateral sources of information.      NEURO    Exam: awake, alert, oriented  Meds: ondansetron Injectable 4 milliGRAM(s) IV Push every 6 hours PRN Nausea and/or Vomiting  opium Tincture 6 milliGRAM(s) Oral <User Schedule>    [x] Adequacy of sedation and pain control has been assessed and adjusted      RESPIRATORY  RR: 19 (01-07-20 @ 00:00) (18 - 39)  SpO2: 99% (01-07-20 @ 00:00) (92% - 100%)  Wt(kg): --  Exam: unlabored, clear to auscultation bilaterally      Meds: albuterol/ipratropium for Nebulization 3 milliLiter(s) Nebulizer every 6 hours  buDESOnide    Inhalation Suspension 0.5 milliGRAM(s) Inhalation every 12 hours        CARDIOVASCULAR  HR: 84 (01-07-20 @ 00:00) (79 - 93)  BP: 100/55 (01-07-20 @ 00:00) (84/47 - 137/64)  BP(mean): 72 (01-07-20 @ 00:00) (60 - 95)  ABP: --  ABP(mean): --  Wt(kg): --  CVP(cm H2O): --      Exam: regular rate and rhythm  Cardiac Rhythm: sinus  Perfusion     [x]Adequate   [ ]Inadequate  Mentation   [x]Normal       [ ]Reduced  Extremities  [x]Warm         [ ]Cool  Volume Status [ ]Hypervolemic [x]Euvolemic [ ]Hypovolemic  Meds:       GI/NUTRITION  Exam: soft, nontender, nondistended, incision C/D/I, Ostomy pink and viable with liquid output  Diet: Dysphagia 1  Meds: diphenoxylate/atropine 2 Tablet(s) Oral <User Schedule>  loperamide 16 milliGRAM(s) Oral <User Schedule>  pantoprazole  Injectable 40 milliGRAM(s) IV Push daily      GENITOURINARY  I&O's Detail    01-05 @ 07:01  -  01-06 @ 07:00  --------------------------------------------------------  IN:    fat emulsion (Fish Oil and Plant Based) 20% Infusion: 301.6 mL    Oral Fluid: 120 mL    sodium chloride 0.9%: 40 mL    sodium chloride 0.9%.: 200 mL    Solution: 750 mL    TPN (Total Parenteral Nutrition): 1800 mL  Total IN: 3211.6 mL    OUT:    Chest Tube: 900 mL    Ileostomy: 1900 mL    Voided: 2550 mL  Total OUT: 5350 mL    Total NET: -2138.4 mL      01-06 @ 07:01  -  01-07 @ 01:42  --------------------------------------------------------  IN:    fat emulsion (Fish Oil and Plant Based) 20% Infusion: 126.9 mL    Oral Fluid: 500 mL    sodium chloride 0.9%.: 160 mL    Solution: 1100 mL    TPN (Total Parenteral Nutrition): 1275 mL  Total IN: 3161.9 mL    OUT:    Chest Tube: 300 mL    Ileostomy: 1850 mL    Voided: 1350 mL  Total OUT: 3500 mL    Total NET: -338.1 mL          01-06    137  |  98  |  14  ----------------------------<  120<H>  4.3   |  29  |  0.44<L>    Ca    7.5<L>      06 Jan 2020 02:44  Phos  3.2     01-06  Mg     1.8     01-06    TPro  5.1<L>  /  Alb  2.1<L>  /  TBili  0.6  /  DBili  0.3<H>  /  AST  12  /  ALT  15  /  AlkPhos  74  01-06      Meds: fat emulsion (Fish Oil and Plant Based) 20% Infusion 20.8 mL/Hr IV Continuous <Continuous>  Parenteral Nutrition - Adult 1 Each TPN Continuous <Continuous>  sodium chloride 0.9%. 1000 milliLiter(s) IV Continuous <Continuous>        HEMATOLOGIC  Meds: enoxaparin Injectable 40 milliGRAM(s) SubCutaneous daily    [x] VTE Prophylaxis Lovenox                        7.4    12.41 )-----------( 239      ( 07 Jan 2020 01:05 )             24.1       Transfusion     [ ] PRBC   [ ] Platelets   [ ] FFP   [ ] Cryoprecipitate      INFECTIOUS DISEASES  WBC Count: 12.41 K/uL (01-07 @ 01:05)  WBC Count: 14.03 K/uL (01-06 @ 02:44)    RECENT CULTURES:    Meds:       ENDOCRINE  CAPILLARY BLOOD GLUCOSE      POCT Blood Glucose.: 133 mg/dL (06 Jan 2020 22:53)  POCT Blood Glucose.: 108 mg/dL (06 Jan 2020 17:47)  POCT Blood Glucose.: 106 mg/dL (06 Jan 2020 12:40)  POCT Blood Glucose.: 126 mg/dL (06 Jan 2020 08:32)    Meds: insulin lispro (HumaLOG) corrective regimen sliding scale   SubCutaneous three times a day before meals  insulin lispro (HumaLOG) corrective regimen sliding scale   SubCutaneous at bedtime  octreotide  Injectable 100 MICROGram(s) SubCutaneous three times a day        ACCESS DEVICES:  [x] Peripheral IV  [ ] Central Venous Line	[ ] R	[ ] L	[ ] IJ	[ ] Fem	[ ] SC	Placed:   [ ] Arterial Line		[ ] R	[ ] L	[ ] Fem	[ ] Rad	[ ] Ax	Placed:   [ ] PICC:					[ ] Mediport  [ ] Urinary Catheter, Date Placed:   [x] Necessity of urinary, arterial, and venous catheters discussed    OTHER MEDICATIONS:  chlorhexidine 2% Cloths 1 Application(s) Topical <User Schedule>      CODE STATUS:      IMAGING:

## 2020-01-07 NOTE — PROGRESS NOTE ADULT - ASSESSMENT
75 y/o male presenting with high grade SBO s/p exploratory laparotomy, lysis of adhesions, decompression of bowel via enterotomy w/ primary repair, & Abthera VAC placement (12/06/2019); RTOR for re-exploration w/ Abthera VAC replacement (12/08/2019) to allow for demarcation of ischemic small bowel; RTOR for re-exploration, small bowel resection of 150 cm (150 cm remaining), ileocecetomy, end ileostomy, mucous fistula, and abdominal closure (12/10/2019); hospital course complicated by SVT, short bowel syndrome requiring repletions w/ IV fluids, malnutrition requiring TPN, intermittent episodes of hypotension, spontaneous left pneumothorax s/p pigtail catheter (12/15/2019-12/24/2019), left pleural effusion s/p pigtail catheter w/ IR (12/30/2019), dysphagia, and oral HSV lesions    PLAN:    Neuro: no acute issues  - Monitor mental status  - Pain control as needed with acetaminophen    Resp: COPD, spontaneous left pneumothorax s/p pigtail catheter, left pleural effusion s/p pigtail catheter w/ IR (12/30)  - Monitor pulse oximeter  - Out of bed to chair, ambulate as tolerated, and incentive spirometry to prevent atelectasis  - Spontaneous left pneumothorax s/p pigtail catheter from 12/15/2019-12/24/2019; left pleural effusion s/p pigtail catheter w/ IR placed on 12/30/2019, will monitor output  - Pulmicort and Duoneb for COPD  - Will need outpatient follow-up with a pulmonologist for his COPD as patient does not currently have one    CV: SVT  - Monitor vital signs  - Was started on metoprolol for episode of SVT but it was discontinued due to intermittent episodes of hypotension, HR currently in the 80-90s so will not restart for now    GI: s/p exploratory laparotomy, [place], decompression of bowel via enterotomy w/ primary repair, & Abthera VAC placement (12/06/2019); re-exploration w/ Abthera VAC replacement (12/08/2019); re-exploration, small bowel resection of 150 cm (150 cm remaining), ileocecetomy, end ileostomy, mucous fistula, and abdominal closure (12/10/2019); short bowel syndrome, malnutrition, dysphagia  - Dysphagia 1 pureed w/ nectar consistency fluid with TPN, currently undergoing calorie count to determine if TPN can be weaned  - Failed FEES with thin liquids on 1/2, will need modified barium swallow prior to discharge so that patient can possibly be discharged with diet upgraded to thin liquids  - Monitor ileostomy output, 0.5:1 repletions of ostomy output with normal saline  - Continue Lomotil, tincture of opium, loperamide, and octreotide for short bowel syndrome  - Protonix to decrease gastric secretions    Renal: no acute issues  - Monitor I&Os  - Monitor electrolytes and replete as necessary    Heme: anemia likely secondary to malnutrition / malabsorption  - Monitor CBC and coags  - Follow up anemia labs  - Lovenox for VTE prophylaxis    ID: oral HSV lesions (s/p acyclovir 12/23/2019-01/02/2020)  - Monitor for clinical evidence of active infection    Endo: no acute issues  - Monitor glucose before meals & at bedtime while on TPN, currently no insulin in TPN bag and patient has not required SSI coverage    Disposition:  - Full code  - Will remain in SICU

## 2020-01-07 NOTE — PROGRESS NOTE ADULT - SUBJECTIVE AND OBJECTIVE BOX
GENERAL SURGERY PROGRESS NOTE    SUBJECTIVE  Patient seen and examined. No acute events overnight. Reports tolerating diet without nausea, vomiting, +/+, voiding without issues. Denies fever, chills, SOB, chest pain.         OBJECTIVE    PHYSICAL EXAM  General: Appears well, NAD  CHEST: breathing comfortably, L CT SS  CV: appears well perfused  Abdomen: soft, nontender, nondistended, no rebound or guarding, midline incision mild pink erythema improving no purulence, ileostomy +/+  Extremities: Grossly symmetric    T(C): 36.7 (01-07-20 @ 07:00), Max: 37.5 (01-06-20 @ 23:00)  HR: 86 (01-07-20 @ 08:00) (79 - 93)  BP: 108/55 (01-07-20 @ 08:00) (84/47 - 136/66)  RR: 20 (01-07-20 @ 08:00) (17 - 39)  SpO2: 100% (01-07-20 @ 08:00) (92% - 100%)    01-06-20 @ 07:01  -  01-07-20 @ 07:00  --------------------------------------------------------  IN: 4252.5 mL / OUT: 4850 mL / NET: -597.5 mL        MEDICATIONS  albuterol/ipratropium for Nebulization 3 milliLiter(s) Nebulizer every 6 hours  buDESOnide    Inhalation Suspension 0.5 milliGRAM(s) Inhalation every 12 hours  chlorhexidine 2% Cloths 1 Application(s) Topical <User Schedule>  diphenoxylate/atropine 2 Tablet(s) Oral <User Schedule>  enoxaparin Injectable 40 milliGRAM(s) SubCutaneous daily  fat emulsion (Fish Oil and Plant Based) 20% Infusion 20.8 mL/Hr IV Continuous <Continuous>  insulin lispro (HumaLOG) corrective regimen sliding scale   SubCutaneous three times a day before meals  insulin lispro (HumaLOG) corrective regimen sliding scale   SubCutaneous at bedtime  loperamide 16 milliGRAM(s) Oral <User Schedule>  octreotide  Injectable 100 MICROGram(s) SubCutaneous three times a day  ondansetron Injectable 4 milliGRAM(s) IV Push every 6 hours PRN  opium Tincture 6 milliGRAM(s) Oral <User Schedule>  pantoprazole  Injectable 40 milliGRAM(s) IV Push daily  Parenteral Nutrition - Adult 1 Each TPN Continuous <Continuous>  sodium chloride 0.9%. 1000 milliLiter(s) IV Continuous <Continuous>      LABS                        7.4    12.41 )-----------( 239      ( 07 Jan 2020 01:05 )             24.1     01-07    137  |  101  |  17  ----------------------------<  123<H>  4.0   |  28  |  0.48<L>    Ca    7.5<L>      07 Jan 2020 01:05  Phos  3.8     01-07  Mg     1.8     01-07    TPro  5.0<L>  /  Alb  1.9<L>  /  TBili  0.6  /  DBili  0.3<H>  /  AST  11  /  ALT  14  /  AlkPhos  66  01-07          RADIOLOGY & ADDITIONAL STUDIES

## 2020-01-07 NOTE — PROGRESS NOTE ADULT - ASSESSMENT
Assessment	  75 y/o M presenting with septic shock and high grade SBO s/p exploratory laparotomy, lysis of adhesions, decompression of bowel via enterotomy w/ primary repair, and Abthera VAC placement on 12/6; s/p take down of Abthera, washout and re-application of the Abthera vac on 12/8. 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 s/p L chest tube placement by IR 12/30 for pleural effusion.     Recommendations:  - c/w dysphagia diet per speech and swallow , TPN  - monitor chest tube output - f/u CTSx for possible pleurodesis for ongoing output   - c/w abx for wound infection  - cont tpn, monitor ostomy output  - no surgical intervention at this time for high ostomy output - too early for reversal of ileostomy. Continue repletions, now 0.5:1  - continue excellent supportive care per SICU    RED SURGERY  [id number]

## 2020-01-07 NOTE — PROGRESS NOTE ADULT - ASSESSMENT
A/P: 74 year old male w/ PMH of COPD and EtOH dependence with PSH/o appy, prostate surgery, & spine surgery  septic shock and high grade SBO s/p exploratory laparotomy, lysis of adhesions, decompression of bowel via enterotomy w/ primary repair, and Abthera VAC placement on 12/6; s/p take down of Abthera, washout and re-application of the Abthera vac on 12/8. Now s/p SBR and end ileostomy on 12/10.   TPN consulted to assist w/ management of pt's nutrition in pt w/ prolonged hospital course now tolerating diet but has high Ileostomy output.  Pt s/p Lt Chest Pigtail for effusion.    Continue TPN at full strength in pt w/ severe Protein-Calorie Malnutrition      -  high ostomy output- pt eating more regularly- continue to monitor      - Tolerating Dysphagia 1 Pureed-Nectar Consistency Fluid w/ Protein Supplements      - Plan to continue TPN & octreotide, lomotil, tincture of opium, & imodium  Pt tolerating TPN at GOAL:  120 grams amino acids (800ml 15% a.a.)  210 grams dextrose (300ml 70% dex)  50 grams SMOFlipid (250ml 20%IVFE @ 20.8ml/hr x 12 hours)  in 1800mL volume  Micronutrients: 10ml MVI, 3ml MTE-5    HypoNa  improving & will continue to monitor-maintaining K levels will help maintain Na  Elevated K -improving, will keep KCl of 40 mEq KCl in TPN          possibly due to Octreotide  HypoPhos resolving, continue NaPhos of 45mMol   Strict Intake and Output-       high ileostomy output - greatly improved, continue to monitor as pt eating more consistently        pt now on increased lomotil, imodium, tincture of opium, & Octreotide   Fecal fat testing- specimen collection- 72hr fecal studies- pending        electrolytes & fat content to be assessed -see what pt is absorbing        to be better able to assess ongoing nutritional needs in pt with high ostomy output that is concerning             for Short Gut Syndrome   Improved glycemic control  - Insulin removed from TPN      Fingersticks & ISS coverage - to be ordered by primary team  RUE swelling - improving w/ arm elevation, monitoring , holding off Duplex as per SICU  IR placed pigtail Lt chest effusion- still high drainage, continue to monitor as per SICU/ IR/ CTS  PICC w/dedicated port for only TPN - maintenance as per protocol  Weights three times a week  Monitor BMP, Mg, Ionized Ca, Phosphorus daily  Continue to monitor Triglycerides and Pre-albumin weekly  Continue as per SICU / Surgery, will follow with you, D/w primary team    Andreina Hubbard PA-C  TPN team, pager [phone]  D/w Ana M Siegel

## 2020-01-07 NOTE — PROGRESS NOTE ADULT - ASSESSMENT
Assessment	  75 y/o M presenting with septic shock and high grade SBO s/p exploratory laparotomy, lysis of adhesions, decompression of bowel via enterotomy w/ primary repair, and Abthera VAC placement on 12/6; s/p take down of Abthera, washout and re-application of the Abthera vac on 12/8. 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 s/p L chest tube placement by IR 12/30 for pleural effusion.     Recommendations:  - c/w dysphagia diet per speech and swallow , TPN  - monitor chest tube output - f/u CTSx and IR recommendations  - c/w abx for wound infection  - cont tpn, monitor ostomy output  - no surgical intervention at this time for high ostomy output - too early for reversal of ileostomy. Continue repletions, now 0.5:1  - continue excellent supportive care per SICU    RED SURGERY  [id number] Assessment	  75 y/o M presenting with septic shock and high grade SBO s/p exploratory laparotomy, lysis of adhesions, decompression of bowel via enterotomy w/ primary repair, and Abthera VAC placement on 12/6; s/p take down of Abthera, washout and re-application of the Abthera vac on 12/8. 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 s/p L chest tube placement by IR 12/30 for pleural effusion.     Recommendations:  - c/w dysphagia diet per speech and swallow , TPN  - monitor chest tube output - f/u CTSx re possible pleurix catheter later this week   - c/w abx for wound infection  - cont tpn, monitor ostomy output  - no surgical intervention at this time for high ostomy output - too early for reversal of ileostomy. Continue repletions, now 0.5:1  - continue excellent supportive care per SICU    RED SURGERY  [id number]

## 2020-01-07 NOTE — PROGRESS NOTE ADULT - ASSESSMENT
73 y/o M presenting with septic shock and high grade SBO s/p exploratory laparotomy, lysis of adhesions, decompression of bowel via enterotomy w/ primary repair, and Abthera VAC placement on 12/6; s/p take down of Abthera, washout and re-application of the Abthera vac on 12/8. Now s/p SBR and end ileostomy on 12/10 acute respiratory distress now improving, Pneumothorax, hyperglycemia, delirium. Now still with persistent pneumothorax when chest pigtail clamped.   12/23 VSS on room air recommending IR drainage of left chest  12/24 No evidence of air leak to left chest tube. Tube clamped. Repeat chest cxr in 4 hours. Anticipate chest tube removal if lung remains expanded. Drainage of loculated left effusion discussed with IR. IR to reconsult for drainage once initial tube removed. Discussed plan with Dr Mayes and IR Fellow  12/24  On NC ,    VSSchest xray sm lt pl eff, left chest tube dc'd.  12/26    2l NC   co  sob,  lt side diminshed  12/27 VSS, CXR with unchanged loculated left pleural effusion- IR consulted for pigtail placement, states effusion can be drained via thoracentesis, pigtail placement not indicated at this time - Dr. Mayes to speak with IR attending.   12/30 Patient with persistent PTX  and left pleural effusion now for IR drainage with Dr Elkins.   12/31 HD stable, L PTC w/ high output, serosanguinous drainage, 990 ml overnight/ 2200 in 24 hrs. Tolerating 2L NC supplemental O2, Cyto pending, continue monitor drainage output.   1/1: LPTC still with High output-400/24h. Continue with pigtail cath drain.  1/ 2     lt pigtail draining  on lws  1/3 HD stable, left pigtail catheter continues to drain. Incentive spirometer encouraged, OOB and mobility, continue pulmonary toileting. Maintain chest tube to suction and monitor output.   1/4: Chest tube milked to prevent clogging of tube, Continues to drain with high out put.  1/5 remains with high out put 170/700. Continue drainage  1/6  persistent drainage from lt pl tube  1/7 Left pigtail 150/450. Daily CXR. Planning for pleurex cath placement on Friday 1/10 w/ Dr. Mayes

## 2020-01-07 NOTE — CHART NOTE - NSCHARTNOTEFT_GEN_A_CORE
GENERAL SURGERY FLOOR TRANSFER NOTE    74y Male transferred to floor from SICU    SUBJECTIVE:   Patient transferred from SICU. In stable condition. Currently on 2L NC.   CT in place.       OBJECTIVE:    T(C): 36.7 (01-07-20 @ 11:00), Max: 37.5 (01-06-20 @ 23:00)  HR: 82 (01-07-20 @ 16:00) (81 - 93)  BP: 109/59 (01-07-20 @ 15:00) (84/49 - 155/68)  RR: 20 (01-07-20 @ 16:00) (17 - 39)  SpO2: 100% (01-07-20 @ 16:00) (88% - 100%)  Wt(kg): --      I&O's Summary    06 Jan 2020 07:01  -  07 Jan 2020 07:00  --------------------------------------------------------  IN: 4252.5 mL / OUT: 4850 mL / NET: -597.5 mL    07 Jan 2020 07:01  -  07 Jan 2020 17:34  --------------------------------------------------------  IN: 1065 mL / OUT: 2875 mL / NET: -1810 mL                              7.4    12.41 )-----------( 239      ( 07 Jan 2020 01:05 )             24.1       01-07    137  |  101  |  17  ----------------------------<  123<H>  4.0   |  28  |  0.48<L>    Ca    7.5<L>      07 Jan 2020 01:05  Phos  3.8     01-07  Mg     1.8     01-07    TPro  5.0<L>  /  Alb  1.9<L>  /  TBili  0.6  /  DBili  0.3<H>  /  AST  11  /  ALT  14  /  AlkPhos  66  01-07      MEDICATIONS  (STANDING):  albuterol/ipratropium for Nebulization 3 milliLiter(s) Nebulizer every 6 hours  buDESOnide    Inhalation Suspension 0.5 milliGRAM(s) Inhalation every 12 hours  chlorhexidine 2% Cloths 1 Application(s) Topical <User Schedule>  diphenoxylate/atropine 2 Tablet(s) Oral <User Schedule>  enoxaparin Injectable 40 milliGRAM(s) SubCutaneous daily  fat emulsion (Fish Oil and Plant Based) 20% Infusion 20.8 mL/Hr (20.8 mL/Hr) IV Continuous <Continuous>  insulin lispro (HumaLOG) corrective regimen sliding scale   SubCutaneous three times a day before meals  insulin lispro (HumaLOG) corrective regimen sliding scale   SubCutaneous at bedtime  loperamide 16 milliGRAM(s) Oral <User Schedule>  octreotide  Injectable 100 MICROGram(s) SubCutaneous three times a day  opium Tincture 6 milliGRAM(s) Oral <User Schedule>  pantoprazole  Injectable 40 milliGRAM(s) IV Push daily  Parenteral Nutrition - Adult 1 Each (75 mL/Hr) TPN Continuous <Continuous>  Parenteral Nutrition - Adult 1 Each (75 mL/Hr) TPN Continuous <Continuous>  sodium chloride 0.9%. 1000 milliLiter(s) (10 mL/Hr) IV Continuous <Continuous>    MEDICATIONS  (PRN):  ondansetron Injectable 4 milliGRAM(s) IV Push every 6 hours PRN Nausea and/or Vomiting    PHYSICAL EXAM  General: Appears well, NAD  CHEST: breathing comfortably, L CT SS  CV: appears well perfused  Abdomen: soft, nontender, nondistended, no rebound or guarding, midline incision mild pink erythema improving no purulence, ileostomy +/+  Extremities: Grossly symmetric      Plan   - pain control   - continue dysphasia diet   - CT procedure later this week   - OOB as tolerated   - monitor high chest tube and ileostomy op

## 2020-01-07 NOTE — PROGRESS NOTE ADULT - SUBJECTIVE AND OBJECTIVE BOX
[facility] NUTRITION SUPPORT / TPN -- FOLLOW UP NOTE  --------------------------------------------------------------------------------    24 hour events/subjective:    Tolerating TPN & eating dysphagia diet - eating well       Continue Octreotide, lomotil, Imodium, & tincture of opium        Strict I&Os       PPI to help decrease gastric secretions       high ostomy out put       repletion as per SICU  72hr collection for Fecal Fat & electrolytes sent  SOB improved w/ NC O2  Pt denies any pain/ dyspnea/ cough/ palp  no n/v   No f/c/s        Diet:  Diet, Dysphagia 1 Pureed-Nectar Consistency Fluid:   Supplement Feeding Modality:  Oral  Ensure Pudding Cans or Servings Per Day:  2       Frequency:  Two Times a day (01-06-20 @ 09:38)      Appetite: [x  ]Poor [  ]Adequate [  ]Good  Caloric intake:  [   ]  Adequate   [  x ] Inadequate    ROS: General/ GI see HPI  all other systems negative    ALLERGIES & MEDICATIONS  --------------------------------------------------------------------------------  ALLERGIES  IV Contrast (Hives)      STANDING INPATIENT MEDICATIONS    albuterol/ipratropium for Nebulization 3 milliLiter(s) Nebulizer every 6 hours  buDESOnide    Inhalation Suspension 0.5 milliGRAM(s) Inhalation every 12 hours  chlorhexidine 2% Cloths 1 Application(s) Topical <User Schedule>  diphenoxylate/atropine 2 Tablet(s) Oral <User Schedule>  enoxaparin Injectable 40 milliGRAM(s) SubCutaneous daily  fat emulsion (Fish Oil and Plant Based) 20% Infusion 20.8 mL/Hr IV Continuous <Continuous>  insulin lispro (HumaLOG) corrective regimen sliding scale   SubCutaneous three times a day before meals  insulin lispro (HumaLOG) corrective regimen sliding scale   SubCutaneous at bedtime  loperamide 16 milliGRAM(s) Oral <User Schedule>  octreotide  Injectable 100 MICROGram(s) SubCutaneous three times a day  opium Tincture 6 milliGRAM(s) Oral <User Schedule>  pantoprazole  Injectable 40 milliGRAM(s) IV Push daily  Parenteral Nutrition - Adult 1 Each TPN Continuous <Continuous>  sodium chloride 0.9%. 1000 milliLiter(s) IV Continuous <Continuous>      PRN INPATIENT MEDICATION  ondansetron Injectable 4 milliGRAM(s) IV Push every 6 hours PRN        VITALS/PHYSICAL EXAM  --------------------------------------------------------------------------------  T(C): 36.7 (01-07-20 @ 07:00), Max: 37.5 (01-06-20 @ 23:00)  HR: 86 (01-07-20 @ 08:00) (79 - 93)  BP: 108/55 (01-07-20 @ 08:00) (84/47 - 136/66)  RR: 20 (01-07-20 @ 08:00) (17 - 39)  SpO2: 100% (01-07-20 @ 08:00) (92% - 100%)  Wt(kg): --        01-06-20 @ 07:01  -  01-07-20 @ 07:00  --------------------------------------------------------  IN: 4252.5 mL / OUT: 4850 mL / NET: -597.5 mL    PHYSICAL EXAM  --------------------------------------------------------------------------------  	Gen: guarded but stable, A&Ox3, NC O2  	HEENT: NC/AT, PERRL, supple neck, clear oropharynx, dried ruptured blisters  	GI: (+) BS, softly distended, non tender                   midline incision with small superficial areas of dehiscence granulating moist w/o drainage, remainder of incision c/d/i also w/o drainage                   (+)ostomy pink viable- thick light brown liquid stool like material              MSK: FROM, no contractures nor deformities  	Vascular: Equally Warm, no clubbing, cyanosis,                        RUE edema greatly improved- almost resolved- w/o cord, tenderness, induration, erythema, fluctuance, drainage, nor blistering                       Rt PICC dressing c/d/I   	Neuro: No focal deficits, intact sensation, weakened strength  	Psych: Normal affect and mood  	Skin: Warm, without rashes, good turgor          LABS/ CULTURES/ RADIOLOGY:              7.4    12.41 >-----------<  239      [01-07-20 @ 01:05]              24.1     137  |  101  |  17  ----------------------------<  123      [01-07-20 @ 01:05]  4.0   |  28  |  0.48        Ca     7.5     [01-07-20 @ 01:05]      Mg     1.8     [01-07-20 @ 01:05]      Phos  3.8     [01-07-20 @ 01:05]    TPro  5.0  /  Alb  1.9  /  TBili  0.6  /  DBili  0.3  /  AST  11  /  ALT  14  /  AlkPhos  66  [01-07-20 @ 01:05]    CAPILLARY BLOOD GLUCOSE  POCT Blood Glucose.: 121 mg/dL (07 Jan 2020 08:43)  POCT Blood Glucose.: 133 mg/dL (06 Jan 2020 22:53)  POCT Blood Glucose.: 108 mg/dL (06 Jan 2020 17:47)  POCT Blood Glucose.: 106 mg/dL (06 Jan 2020 12:40)    Prealbumin, Serum: 13 mg/dL (01-07-20 @ 02:35)  Prealbumin, Serum: 14 mg/dL (01-06-20 @ 07:40)  Prealbumin, Serum: 16 mg/dL (01-01-20 @ 02:48)  Prealbumin, Serum: 15 mg/dL (12-31-19 @ 03:11)  Prealbumin, Serum: 16 mg/dL (12-30-19 @ 07:53)    Triglycerides, Serum: 50 mg/dL (01.07.20 @ 01:05)  Triglycerides, Serum: 48 mg/dL (01.06.20 @ 02:44)  Triglycerides, Serum: 56 mg/dL (01.01.20 @ 00:42)  Triglycerides, Serum: 39 mg/dL (12.31.19 @ 00:56) [facility] NUTRITION SUPPORT / TPN -- FOLLOW UP NOTE  --------------------------------------------------------------------------------    24 hour events/subjective:    Tolerating TPN & eating dysphagia diet - eating well       Continue Octreotide, lomotil, Imodium, & tincture of opium        Strict I&Os       PPI to help decrease gastric secretions       high ostomy out put       repletion as per SICU  72hr collection for Fecal Fat & electrolytes sent  SOB improved w/ NC O2  Pt denies any pain/ dyspnea/ cough/ palp  no n/v   No f/c/s        Diet:  Diet, Dysphagia 1 Pureed-Nectar Consistency Fluid:   Supplement Feeding Modality:  Oral  Ensure Pudding Cans or Servings Per Day:  2       Frequency:  Two Times a day (01-06-20 @ 09:38)      Appetite: [x  ]Poor [  ]Adequate [  ]Good  Caloric intake:  [   ]  Adequate   [  x ] Inadequate    ROS: General/ GI see HPI  all other systems negative    ALLERGIES & MEDICATIONS  --------------------------------------------------------------------------------  ALLERGIES  IV Contrast (Hives)      STANDING INPATIENT MEDICATIONS    albuterol/ipratropium for Nebulization 3 milliLiter(s) Nebulizer every 6 hours  buDESOnide    Inhalation Suspension 0.5 milliGRAM(s) Inhalation every 12 hours  chlorhexidine 2% Cloths 1 Application(s) Topical <User Schedule>  diphenoxylate/atropine 2 Tablet(s) Oral <User Schedule>  enoxaparin Injectable 40 milliGRAM(s) SubCutaneous daily  fat emulsion (Fish Oil and Plant Based) 20% Infusion 20.8 mL/Hr IV Continuous <Continuous>  insulin lispro (HumaLOG) corrective regimen sliding scale   SubCutaneous three times a day before meals  insulin lispro (HumaLOG) corrective regimen sliding scale   SubCutaneous at bedtime  loperamide 16 milliGRAM(s) Oral <User Schedule>  octreotide  Injectable 100 MICROGram(s) SubCutaneous three times a day  opium Tincture 6 milliGRAM(s) Oral <User Schedule>  pantoprazole  Injectable 40 milliGRAM(s) IV Push daily  Parenteral Nutrition - Adult 1 Each TPN Continuous <Continuous>  sodium chloride 0.9%. 1000 milliLiter(s) IV Continuous <Continuous>      PRN INPATIENT MEDICATION  ondansetron Injectable 4 milliGRAM(s) IV Push every 6 hours PRN        VITALS/PHYSICAL EXAM  --------------------------------------------------------------------------------  T(C): 36.7 (01-07-20 @ 07:00), Max: 37.5 (01-06-20 @ 23:00)  HR: 86 (01-07-20 @ 08:00) (79 - 93)  BP: 108/55 (01-07-20 @ 08:00) (84/47 - 136/66)  RR: 20 (01-07-20 @ 08:00) (17 - 39)  SpO2: 100% (01-07-20 @ 08:00) (92% - 100%)  Wt(kg): --        01-06-20 @ 07:01  -  01-07-20 @ 07:00  --------------------------------------------------------  IN: 4252.5 mL / OUT: 4850 mL / NET: -597.5 mL    PHYSICAL EXAM  --------------------------------------------------------------------------------  	Gen: guarded but stable, A&Ox3, NC O2  	HEENT: NC/AT, PERRL, supple neck, clear oropharynx, dried ruptured blisters  	GI: (+) BS, softly distended, non tender                   midline incision with small superficial areas of dehiscence granulating moist w/o drainage,                        remainder of incision c/d/i also w/o drainage                   (+)ostomy pink viable- thick light brown liquid stool like material              MSK: FROM, no contractures nor deformities  	Vascular: Equally Warm, no clubbing, cyanosis,                        RUE edema greatly improved- almost resolved- w/o cord, tenderness, induration, erythema,                              fluctuance, drainage, nor blistering                       Rt PICC dressing c/d/I   	Neuro: No focal deficits, intact sensation, weakened strength  	Psych: Normal affect and mood  	Skin: Warm, without rashes, good turgor    LABS/ CULTURES/ RADIOLOGY:              7.4    12.41 >-----------<  239      [01-07-20 @ 01:05]              24.1     137  |  101  |  17  ----------------------------<  123      [01-07-20 @ 01:05]  4.0   |  28  |  0.48        Ca     7.5     [01-07-20 @ 01:05]      Mg     1.8     [01-07-20 @ 01:05]      Phos  3.8     [01-07-20 @ 01:05]    TPro  5.0  /  Alb  1.9  /  TBili  0.6  /  DBili  0.3  /  AST  11  /  ALT  14  /  AlkPhos  66  [01-07-20 @ 01:05]    CAPILLARY BLOOD GLUCOSE  POCT Blood Glucose.: 121 mg/dL (07 Jan 2020 08:43)  POCT Blood Glucose.: 133 mg/dL (06 Jan 2020 22:53)  POCT Blood Glucose.: 108 mg/dL (06 Jan 2020 17:47)  POCT Blood Glucose.: 106 mg/dL (06 Jan 2020 12:40)    Prealbumin, Serum: 13 mg/dL (01-07-20 @ 02:35)  Prealbumin, Serum: 14 mg/dL (01-06-20 @ 07:40)  Prealbumin, Serum: 16 mg/dL (01-01-20 @ 02:48)  Prealbumin, Serum: 15 mg/dL (12-31-19 @ 03:11)  Prealbumin, Serum: 16 mg/dL (12-30-19 @ 07:53)    Triglycerides, Serum: 50 mg/dL (01.07.20 @ 01:05)  Triglycerides, Serum: 48 mg/dL (01.06.20 @ 02:44)  Triglycerides, Serum: 56 mg/dL (01.01.20 @ 00:42)  Triglycerides, Serum: 39 mg/dL (12.31.19 @ 00:56)

## 2020-01-07 NOTE — PROGRESS NOTE ADULT - ATTENDING COMMENTS
Continue to have high output from ileostomy, CT with good urine output  Dr Chi contacted to assess for pleurax catheter  Pt on max dose of multiple antiprokinetic agents  PT/mobilization

## 2020-01-07 NOTE — PROGRESS NOTE ADULT - SUBJECTIVE AND OBJECTIVE BOX
Surgery Progress Note  Patient is a 74y old  Male who presents with a chief complaint of abd. pain (07 Jan 2020 01:42)      SUBJECTIVE: Patient seen and examined at bedside with surgical team    24 HOUR EVENTS:  - No active issues overnight    Vital Signs Last 24 Hrs  T(C): 36.7 (07 Jan 2020 07:00), Max: 37.5 (06 Jan 2020 23:00)  T(F): 98 (07 Jan 2020 07:00), Max: 99.5 (06 Jan 2020 23:00)  HR: 86 (07 Jan 2020 08:00) (79 - 93)  BP: 108/55 (07 Jan 2020 08:00) (84/47 - 136/66)  BP(mean): 78 (07 Jan 2020 08:00) (60 - 95)  RR: 20 (07 Jan 2020 08:00) (17 - 39)  SpO2: 100% (07 Jan 2020 08:00) (92% - 100%)    Physical Exam  Constitutional: NAD  Respiratory: breathing comfortably on RA  Abd: soft, NT, ND, incision site is well approximated with decreasing erythema    Ext: moving all 4 ext spontaneously     I&O's Detail    06 Jan 2020 07:01  -  07 Jan 2020 07:00  --------------------------------------------------------  IN:    fat emulsion (Fish Oil and Plant Based) 20% Infusion: 272.5 mL    Oral Fluid: 650 mL    sodium chloride 0.9%.: 230 mL    Solution: 1250 mL    Solution: 50 mL    TPN (Total Parenteral Nutrition): 1800 mL  Total IN: 4252.5 mL    OUT:    Chest Tube: 450 mL    Ileostomy: 2650 mL    Voided: 1750 mL  Total OUT: 4850 mL    Total NET: -597.5 mL      MEDICATIONS  (STANDING):  albuterol/ipratropium for Nebulization 3 milliLiter(s) Nebulizer every 6 hours  buDESOnide    Inhalation Suspension 0.5 milliGRAM(s) Inhalation every 12 hours  chlorhexidine 2% Cloths 1 Application(s) Topical <User Schedule>  diphenoxylate/atropine 2 Tablet(s) Oral <User Schedule>  enoxaparin Injectable 40 milliGRAM(s) SubCutaneous daily  fat emulsion (Fish Oil and Plant Based) 20% Infusion 20.8 mL/Hr (20.8 mL/Hr) IV Continuous <Continuous>  insulin lispro (HumaLOG) corrective regimen sliding scale   SubCutaneous three times a day before meals  insulin lispro (HumaLOG) corrective regimen sliding scale   SubCutaneous at bedtime  loperamide 16 milliGRAM(s) Oral <User Schedule>  octreotide  Injectable 100 MICROGram(s) SubCutaneous three times a day  opium Tincture 6 milliGRAM(s) Oral <User Schedule>  pantoprazole  Injectable 40 milliGRAM(s) IV Push daily  Parenteral Nutrition - Adult 1 Each (75 mL/Hr) TPN Continuous <Continuous>  sodium chloride 0.9%. 1000 milliLiter(s) (10 mL/Hr) IV Continuous <Continuous>    MEDICATIONS  (PRN):  ondansetron Injectable 4 milliGRAM(s) IV Push every 6 hours PRN Nausea and/or Vomiting      LABS:                        7.4    12.41 )-----------( 239      ( 07 Jan 2020 01:05 )             24.1     01-07    137  |  101  |  17  ----------------------------<  123<H>  4.0   |  28  |  0.48<L>    Ca    7.5<L>      07 Jan 2020 01:05  Phos  3.8     01-07  Mg     1.8     01-07    TPro  5.0<L>  /  Alb  1.9<L>  /  TBili  0.6  /  DBili  0.3<H>  /  AST  11  /  ALT  14  /  AlkPhos  66  01-07      LIVER FUNCTIONS - ( 07 Jan 2020 01:05 )  Alb: 1.9 g/dL / Pro: 5.0 g/dL / ALK PHOS: 66 U/L / ALT: 14 U/L / AST: 11 U/L / GGT: x

## 2020-01-08 PROBLEM — J44.9 CHRONIC OBSTRUCTIVE PULMONARY DISEASE, UNSPECIFIED: Chronic | Status: ACTIVE | Noted: 2019-12-06

## 2020-01-08 LAB
ALBUMIN SERPL ELPH-MCNC: 2.2 G/DL — LOW (ref 3.3–5)
ALP SERPL-CCNC: 73 U/L — SIGNIFICANT CHANGE UP (ref 40–120)
ALT FLD-CCNC: 15 U/L — SIGNIFICANT CHANGE UP (ref 10–45)
ANION GAP SERPL CALC-SCNC: 14 MMOL/L — SIGNIFICANT CHANGE UP (ref 5–17)
AST SERPL-CCNC: 11 U/L — SIGNIFICANT CHANGE UP (ref 10–40)
BILIRUB DIRECT SERPL-MCNC: 0.3 MG/DL — HIGH (ref 0–0.2)
BILIRUB INDIRECT FLD-MCNC: 0.3 MG/DL — SIGNIFICANT CHANGE UP (ref 0.2–1)
BILIRUB SERPL-MCNC: 0.6 MG/DL — SIGNIFICANT CHANGE UP (ref 0.2–1.2)
BUN SERPL-MCNC: 16 MG/DL — SIGNIFICANT CHANGE UP (ref 7–23)
CALCIUM SERPL-MCNC: 7.6 MG/DL — LOW (ref 8.4–10.5)
CHLORIDE SERPL-SCNC: 97 MMOL/L — SIGNIFICANT CHANGE UP (ref 96–108)
CO2 SERPL-SCNC: 28 MMOL/L — SIGNIFICANT CHANGE UP (ref 22–31)
CREAT SERPL-MCNC: 0.45 MG/DL — LOW (ref 0.5–1.3)
GLUCOSE BLDC GLUCOMTR-MCNC: 113 MG/DL — HIGH (ref 70–99)
GLUCOSE BLDC GLUCOMTR-MCNC: 114 MG/DL — HIGH (ref 70–99)
GLUCOSE BLDC GLUCOMTR-MCNC: 116 MG/DL — HIGH (ref 70–99)
GLUCOSE BLDC GLUCOMTR-MCNC: 119 MG/DL — HIGH (ref 70–99)
GLUCOSE SERPL-MCNC: 118 MG/DL — HIGH (ref 70–99)
HCT VFR BLD CALC: 24.2 % — LOW (ref 39–50)
HGB BLD-MCNC: 7.7 G/DL — LOW (ref 13–17)
MAGNESIUM SERPL-MCNC: 1.8 MG/DL — SIGNIFICANT CHANGE UP (ref 1.6–2.6)
MCHC RBC-ENTMCNC: 30.7 PG — SIGNIFICANT CHANGE UP (ref 27–34)
MCHC RBC-ENTMCNC: 31.8 GM/DL — LOW (ref 32–36)
MCV RBC AUTO: 96.4 FL — SIGNIFICANT CHANGE UP (ref 80–100)
PHOSPHATE SERPL-MCNC: 3.7 MG/DL — SIGNIFICANT CHANGE UP (ref 2.5–4.5)
PLATELET # BLD AUTO: 291 K/UL — SIGNIFICANT CHANGE UP (ref 150–400)
POTASSIUM SERPL-MCNC: 4.1 MMOL/L — SIGNIFICANT CHANGE UP (ref 3.5–5.3)
POTASSIUM SERPL-SCNC: 4.1 MMOL/L — SIGNIFICANT CHANGE UP (ref 3.5–5.3)
PROT SERPL-MCNC: 5.4 G/DL — LOW (ref 6–8.3)
RBC # BLD: 2.51 M/UL — LOW (ref 4.2–5.8)
RBC # FLD: 15.3 % — HIGH (ref 10.3–14.5)
SODIUM SERPL-SCNC: 139 MMOL/L — SIGNIFICANT CHANGE UP (ref 135–145)
WBC # BLD: 12.31 K/UL — HIGH (ref 3.8–10.5)
WBC # FLD AUTO: 12.31 K/UL — HIGH (ref 3.8–10.5)

## 2020-01-08 PROCEDURE — 99232 SBSQ HOSP IP/OBS MODERATE 35: CPT

## 2020-01-08 PROCEDURE — 99233 SBSQ HOSP IP/OBS HIGH 50: CPT

## 2020-01-08 RX ORDER — HYDROCORTISONE 1 %
1 OINTMENT (GRAM) TOPICAL DAILY
Refills: 0 | Status: DISCONTINUED | OUTPATIENT
Start: 2020-01-08 | End: 2020-01-10

## 2020-01-08 RX ORDER — ENOXAPARIN SODIUM 100 MG/ML
40 INJECTION SUBCUTANEOUS ONCE
Refills: 0 | Status: COMPLETED | OUTPATIENT
Start: 2020-01-08 | End: 2020-01-08

## 2020-01-08 RX ORDER — MAGNESIUM SULFATE 500 MG/ML
2 VIAL (ML) INJECTION ONCE
Refills: 0 | Status: COMPLETED | OUTPATIENT
Start: 2020-01-08 | End: 2020-01-08

## 2020-01-08 RX ORDER — ELECTROLYTE SOLUTION,INJ
1 VIAL (ML) INTRAVENOUS
Refills: 0 | Status: DISCONTINUED | OUTPATIENT
Start: 2020-01-08 | End: 2020-01-08

## 2020-01-08 RX ORDER — I.V. FAT EMULSION 20 G/100ML
20.8 EMULSION INTRAVENOUS
Qty: 50 | Refills: 0 | Status: DISCONTINUED | OUTPATIENT
Start: 2020-01-08 | End: 2020-01-09

## 2020-01-08 RX ADMIN — Medication 1 EACH: at 17:58

## 2020-01-08 RX ADMIN — CHLORHEXIDINE GLUCONATE 1 APPLICATION(S): 213 SOLUTION TOPICAL at 08:36

## 2020-01-08 RX ADMIN — Medication 0.5 MILLIGRAM(S): at 18:06

## 2020-01-08 RX ADMIN — Medication 3 MILLILITER(S): at 11:44

## 2020-01-08 RX ADMIN — Medication 16 MILLIGRAM(S): at 12:12

## 2020-01-08 RX ADMIN — I.V. FAT EMULSION 20.8 ML/HR: 20 EMULSION INTRAVENOUS at 17:57

## 2020-01-08 RX ADMIN — OCTREOTIDE ACETATE 100 MICROGRAM(S): 200 INJECTION, SOLUTION INTRAVENOUS; SUBCUTANEOUS at 23:25

## 2020-01-08 RX ADMIN — MORPHINE 6 MILLIGRAM(S): 10 SOLUTION ORAL at 23:26

## 2020-01-08 RX ADMIN — ENOXAPARIN SODIUM 40 MILLIGRAM(S): 100 INJECTION SUBCUTANEOUS at 11:47

## 2020-01-08 RX ADMIN — SODIUM CHLORIDE 10 MILLILITER(S): 9 INJECTION INTRAMUSCULAR; INTRAVENOUS; SUBCUTANEOUS at 11:48

## 2020-01-08 RX ADMIN — Medication 3 MILLILITER(S): at 17:39

## 2020-01-08 RX ADMIN — Medication 2 TABLET(S): at 17:55

## 2020-01-08 RX ADMIN — SODIUM CHLORIDE 10 MILLILITER(S): 9 INJECTION INTRAMUSCULAR; INTRAVENOUS; SUBCUTANEOUS at 05:34

## 2020-01-08 RX ADMIN — MORPHINE 6 MILLIGRAM(S): 10 SOLUTION ORAL at 08:25

## 2020-01-08 RX ADMIN — Medication 2 TABLET(S): at 08:25

## 2020-01-08 RX ADMIN — Medication 16 MILLIGRAM(S): at 23:26

## 2020-01-08 RX ADMIN — OCTREOTIDE ACETATE 100 MICROGRAM(S): 200 INJECTION, SOLUTION INTRAVENOUS; SUBCUTANEOUS at 05:33

## 2020-01-08 RX ADMIN — PANTOPRAZOLE SODIUM 40 MILLIGRAM(S): 20 TABLET, DELAYED RELEASE ORAL at 11:47

## 2020-01-08 RX ADMIN — Medication 2 TABLET(S): at 12:11

## 2020-01-08 RX ADMIN — OCTREOTIDE ACETATE 100 MICROGRAM(S): 200 INJECTION, SOLUTION INTRAVENOUS; SUBCUTANEOUS at 13:14

## 2020-01-08 RX ADMIN — MORPHINE 6 MILLIGRAM(S): 10 SOLUTION ORAL at 17:55

## 2020-01-08 RX ADMIN — Medication 16 MILLIGRAM(S): at 17:45

## 2020-01-08 RX ADMIN — Medication 0.5 MILLIGRAM(S): at 05:33

## 2020-01-08 RX ADMIN — MORPHINE 6 MILLIGRAM(S): 10 SOLUTION ORAL at 12:11

## 2020-01-08 RX ADMIN — Medication 3 MILLILITER(S): at 23:26

## 2020-01-08 RX ADMIN — Medication 2 TABLET(S): at 23:26

## 2020-01-08 RX ADMIN — Medication 3 MILLILITER(S): at 05:33

## 2020-01-08 RX ADMIN — Medication 50 GRAM(S): at 08:25

## 2020-01-08 RX ADMIN — Medication 16 MILLIGRAM(S): at 08:34

## 2020-01-08 NOTE — PROGRESS NOTE ADULT - SUBJECTIVE AND OBJECTIVE BOX
VITAL SIGNS    Vital Signs Last 24 Hrs  T(C): 36.9 (01-08-20 @ 09:29), Max: 37.2 (01-08-20 @ 05:13)  T(F): 98.4 (01-08-20 @ 09:29), Max: 98.9 (01-08-20 @ 05:13)  HR: 95 (01-08-20 @ 09:29) (82 - 95)  BP: 119/78 (01-08-20 @ 09:29) (84/49 - 137/74)  RR: 19 (01-08-20 @ 09:29) (19 - 30)  SpO2: 95% (01-08-20 @ 09:29) (94% - 100%)                   Daily     Daily       Bilirubin Direct, Serum: 0.3 mg/dL (01-08 @ 06:53)  Bilirubin Total, Serum: 0.6 mg/dL (01-08 @ 06:53)    CAPILLARY BLOOD GLUCOSE      POCT Blood Glucose.: 119 mg/dL (08 Jan 2020 08:55)  POCT Blood Glucose.: 151 mg/dL (07 Jan 2020 21:58)  POCT Blood Glucose.: 130 mg/dL (07 Jan 2020 15:28)          Drains:                  L Pleural  [  ]  Drainage:                                      PHYSICAL EXAM  s  "  No SOB  No Chest Pain"  Neurology: alert and oriented x 3, moves all extremities with no defecits  CV :  RRR  Lungs:  lt lung diminshed   Abdomen: soft, nontender, nondistended, positive bowel sounds  Extremities: no edema   no calf tenderness

## 2020-01-08 NOTE — PROGRESS NOTE ADULT - SUBJECTIVE AND OBJECTIVE BOX
Subjective: Patient seen and examined. No new events except as noted.   Moved out of ICU   feels ok     REVIEW OF SYSTEMS:    CONSTITUTIONAL: + weakness, fevers or chills  EYES/ENT: No visual changes;  No vertigo or throat pain   NECK: No pain or stiffness  RESPIRATORY: No cough, wheezing, hemoptysis; No shortness of breath  CARDIOVASCULAR: No chest pain or palpitations  GASTROINTESTINAL: No abdominal or epigastric pain. No nausea, vomiting, or hematemesis; No diarrhea or constipation. No melena or hematochezia.  GENITOURINARY: No dysuria, frequency or hematuria  NEUROLOGICAL: No numbness or weakness  SKIN: No itching, burning, rashes, or lesions   All other review of systems is negative unless indicated above.    MEDICATIONS:  MEDICATIONS  (STANDING):  albuterol/ipratropium for Nebulization 3 milliLiter(s) Nebulizer every 6 hours  buDESOnide    Inhalation Suspension 0.5 milliGRAM(s) Inhalation every 12 hours  chlorhexidine 2% Cloths 1 Application(s) Topical <User Schedule>  diphenoxylate/atropine 2 Tablet(s) Oral <User Schedule>  fat emulsion (Fish Oil and Plant Based) 20% Infusion 20.8 mL/Hr (20.8 mL/Hr) IV Continuous <Continuous>  insulin lispro (HumaLOG) corrective regimen sliding scale   SubCutaneous three times a day before meals  insulin lispro (HumaLOG) corrective regimen sliding scale   SubCutaneous at bedtime  loperamide 16 milliGRAM(s) Oral <User Schedule>  octreotide  Injectable 100 MICROGram(s) SubCutaneous three times a day  opium Tincture 6 milliGRAM(s) Oral <User Schedule>  pantoprazole  Injectable 40 milliGRAM(s) IV Push daily  Parenteral Nutrition - Adult 1 Each (75 mL/Hr) TPN Continuous <Continuous>  sodium chloride 0.9%. 1000 milliLiter(s) (10 mL/Hr) IV Continuous <Continuous>      PHYSICAL EXAM:  T(C): 37.1 (01-08-20 @ 16:57), Max: 37.2 (01-08-20 @ 05:13)  HR: 89 (01-08-20 @ 16:57) (82 - 95)  BP: 134/75 (01-08-20 @ 16:57) (114/70 - 134/75)  RR: 18 (01-08-20 @ 16:57) (18 - 20)  SpO2: 99% (01-08-20 @ 16:57) (94% - 99%)  Wt(kg): --  I&O's Summary    07 Jan 2020 07:01  -  08 Jan 2020 07:00  --------------------------------------------------------  IN: 3641.2 mL / OUT: 4700 mL / NET: -1058.8 mL    08 Jan 2020 07:01  -  08 Jan 2020 20:36  --------------------------------------------------------  IN: 4051.6 mL / OUT: 3882 mL / NET: 169.6 mL        Appearance: NAD   HEENT:   Dry oral mucosa, crusted lips   Lymphatic: No lymphadenopathy   Cardiovascular: Normal S1 S2, No JVD, No murmurs , Peripheral pulses palpable 2+ bilaterally  Respiratory: Decreased bs   Gastrointestinal:  Soft, NT, ND. Ostomy pink with output. Midline staples in place, midline incision site is c/d/i   Skin: No rashes, No ecchymoses, No cyanosis, warm to touch  Musculoskeletal: Decreased  range of motion and strength  Psychiatry:  mildly sedated   Ext: No edema +PICC line   +rosas         LABS:    CARDIAC MARKERS:                                7.7    12.31 )-----------( 291      ( 08 Jan 2020 09:04 )             24.2     01-08    139  |  97  |  16  ----------------------------<  118<H>  4.1   |  28  |  0.45<L>    Ca    7.6<L>      08 Jan 2020 06:53  Phos  3.7     01-08  Mg     1.8     01-08    TPro  5.4<L>  /  Alb  2.2<L>  /  TBili  0.6  /  DBili  0.3<H>  /  AST  11  /  ALT  15  /  AlkPhos  73  01-08    proBNP:   Lipid Profile:   HgA1c:   TSH:             TELEMETRY: 	    ECG:  	  RADIOLOGY:   DIAGNOSTIC TESTING:  [ ] Echocardiogram:  [ ]  Catheterization:  [ ] Stress Test:    OTHER:

## 2020-01-08 NOTE — PROGRESS NOTE ADULT - SUBJECTIVE AND OBJECTIVE BOX
S: Pt seen and examined. Reports rash on back for few days, denies feeling itchy.  Reports associated tingling.  Relieved with creams. Reports tolerating diet without issues.  Denies N/V/ abd pain.        MEDICATIONS  (STANDING):  albuterol/ipratropium for Nebulization 3 milliLiter(s) Nebulizer every 6 hours  buDESOnide    Inhalation Suspension 0.5 milliGRAM(s) Inhalation every 12 hours  chlorhexidine 2% Cloths 1 Application(s) Topical <User Schedule>  diphenoxylate/atropine 2 Tablet(s) Oral <User Schedule>  enoxaparin Injectable 40 milliGRAM(s) SubCutaneous daily  fat emulsion (Fish Oil and Plant Based) 20% Infusion 20.8 mL/Hr (20.8 mL/Hr) IV Continuous <Continuous>  insulin lispro (HumaLOG) corrective regimen sliding scale   SubCutaneous three times a day before meals  insulin lispro (HumaLOG) corrective regimen sliding scale   SubCutaneous at bedtime  loperamide 16 milliGRAM(s) Oral <User Schedule>  magnesium sulfate  IVPB 2 Gram(s) IV Intermittent once  octreotide  Injectable 100 MICROGram(s) SubCutaneous three times a day  opium Tincture 6 milliGRAM(s) Oral <User Schedule>  pantoprazole  Injectable 40 milliGRAM(s) IV Push daily  Parenteral Nutrition - Adult 1 Each (75 mL/Hr) TPN Continuous <Continuous>  sodium chloride 0.9%. 1000 milliLiter(s) (10 mL/Hr) IV Continuous <Continuous>    MEDICATIONS  (PRN):  ondansetron Injectable 4 milliGRAM(s) IV Push every 6 hours PRN Nausea and/or Vomiting      LABS:                        7.4    12.41 )-----------( 239      ( 07 Jan 2020 01:05 )             24.1     01-08    139  |  97  |  16  ----------------------------<  118<H>  4.1   |  28  |  0.45<L>    Ca    7.6<L>      08 Jan 2020 06:53  Phos  3.7     01-08  Mg     1.8     01-08    TPro  5.4<L>  /  Alb  2.2<L>  /  TBili  0.6  /  DBili  0.3<H>  /  AST  11  /  ALT  15  /  AlkPhos  73  01-08            Vital Signs Last 24 Hrs  T(C): 37.2 (08 Jan 2020 05:13), Max: 37.2 (08 Jan 2020 05:13)  T(F): 98.9 (08 Jan 2020 05:13), Max: 98.9 (08 Jan 2020 05:13)  HR: 82 (08 Jan 2020 05:13) (81 - 94)  BP: 122/68 (08 Jan 2020 05:13) (84/49 - 155/68)  BP(mean): 81 (07 Jan 2020 15:00) (61 - 87)  RR: 20 (08 Jan 2020 05:13) (20 - 30)  SpO2: 94% (08 Jan 2020 05:13) (88% - 100%)      I&O's Summary    07 Jan 2020 07:01  -  08 Jan 2020 07:00  --------------------------------------------------------  IN: 2491.6 mL / OUT: 4700 mL / NET: -2208.4 mL      I&O's Detail    07 Jan 2020 07:01  -  08 Jan 2020 07:00  --------------------------------------------------------  IN:    fat emulsion (Fish Oil and Plant Based) 20% Infusion: 41.6 mL    Oral Fluid: 360 mL    sodium chloride 0.9%.: 90 mL    Solution: 1175 mL    TPN (Total Parenteral Nutrition): 825 mL  Total IN: 2491.6 mL    OUT:    Chest Tube: 800 mL    Ileostomy: 2350 mL    Voided: 1550 mL  Total OUT: 4700 mL    Total NET: -2208.4 mL          PHYSICAL EXAM  General: Appears well, NAD  CHEST: breathing comfortably, L CT SS  CV: appears well perfused  Abdomen: soft, nontender, nondistended, no rebound or guarding, midline incision mild pink erythema improving no purulence, ileostomy +/+  Extremities: Grossly symmetric    .  .

## 2020-01-08 NOTE — PROGRESS NOTE ADULT - ASSESSMENT
75 y/o M presenting with septic shock and high grade SBO s/p exploratory laparotomy, lysis of adhesions, decompression of bowel via enterotomy w/ primary repair, and Abthera VAC placement on 12/6; s/p take down of Abthera, washout and re-application of the Abthera vac on 12/8. Now s/p SBR and end ileostomy on 12/10 acute respiratory distress now improving, Pneumothorax, hyperglycemia, delirium. Now still with persistent pneumothorax when chest pigtail clamped.   12/23 VSS on room air recommending IR drainage of left chest  12/24 No evidence of air leak to left chest tube. Tube clamped. Repeat chest cxr in 4 hours. Anticipate chest tube removal if lung remains expanded. Drainage of loculated left effusion discussed with IR. IR to reconsult for drainage once initial tube removed. Discussed plan with Dr Mayes and IR Fellow  12/24  On NC ,    VSSchest xray sm lt pl eff, left chest tube dc'd.  12/26    2l NC   co  sob,  lt side diminshed  12/27 VSS, CXR with unchanged loculated left pleural effusion- IR consulted for pigtail placement, states effusion can be drained via thoracentesis, pigtail placement not indicated at this time - Dr. Mayes to speak with IR attending.   12/30 Patient with persistent PTX  and left pleural effusion now for IR drainage with Dr Elkins.   12/31 HD stable, L PTC w/ high output, serosanguinous drainage, 990 ml overnight/ 2200 in 24 hrs. Tolerating 2L NC supplemental O2, Cyto pending, continue monitor drainage output.   1/1: LPTC still with High output-400/24h. Continue with pigtail cath drain.  1/ 2     lt pigtail draining  on lws  1/3 HD stable, left pigtail catheter continues to drain. Incentive spirometer encouraged, OOB and mobility, continue pulmonary toileting. Maintain chest tube to suction and monitor output.   1/4: Chest tube milked to prevent clogging of tube, Continues to drain with high out put.  1/5 remains with high out put 170/700. Continue drainage  1/6  persistent drainage from lt pl tube  1/7 Left pigtail 150/450. Daily CXR. Planning for pleurex cath placement on Friday 1/10 w/ Dr. Mayes  1/8        Lt pleural tube   persistent draining

## 2020-01-08 NOTE — PROGRESS NOTE ADULT - PROBLEM SELECTOR PLAN 1
sp  IR placed lt pigtail  keep lws  daily chest xray while pl tube in place  continue monitor chest tube output  last lovenox dose thursday am   for procedure,   will clamp tube on thursday  Planning for pleurex cath placement on Friday 1/10 w/ Dr. Mayes

## 2020-01-08 NOTE — PROGRESS NOTE ADULT - SUBJECTIVE AND OBJECTIVE BOX
[facility] NUTRITION SUPPORT / TPN -- FOLLOW UP NOTE  --------------------------------------------------------------------------------    24 hour events/subjective:  Pt transferred to med/ surg floor  Pt 'depressed' that he's still in hospital- offered counseling  Tolerating TPN & eating dysphagia diet - eating well       Continue Octreotide, lomotil, Imodium, & tincture of opium         high ostomy out put        PPI to help decrease gastric secretions      repletion as per SICU  72hr collection for Fecal Fat & electrolytes pending  OR Friday w/ Thoracic Surgery for Lt chest effusion  SOB improved w/ NC O2  Pt denies any pain/ dyspnea/ cough/ palp  no n/v   No f/c/s        Diet:  Diet, Dysphagia 1 Pureed-Nectar Consistency Fluid:   Supplement Feeding Modality:  Oral  Ensure Pudding Cans or Servings Per Day:  2       Frequency:  Two Times a day (01-06-20 @ 09:38)  Diet, NPO after Midnight:      NPO Start Date: 08-Jan-2020,   NPO Start Time: 23:59 (01-08-20 @ 09:25)      Appetite: [  ]Poor [  ]Adequate [  ]Good  Caloric intake:  [   ]  Adequate   [   ] Inadequate    ROS: General/ GI see HPI  all other systems negative  pt unable to offer    ALLERGIES & MEDICATIONS  --------------------------------------------------------------------------------  ALLERGIES  Allergies    IV Contrast (Hives)    Intolerances        INTOLERANCES    STANDING INPATIENT MEDICATIONS    albuterol/ipratropium for Nebulization 3 milliLiter(s) Nebulizer every 6 hours  buDESOnide    Inhalation Suspension 0.5 milliGRAM(s) Inhalation every 12 hours  chlorhexidine 2% Cloths 1 Application(s) Topical <User Schedule>  diphenoxylate/atropine 2 Tablet(s) Oral <User Schedule>  fat emulsion (Fish Oil and Plant Based) 20% Infusion 20.8 mL/Hr IV Continuous <Continuous>  insulin lispro (HumaLOG) corrective regimen sliding scale   SubCutaneous three times a day before meals  insulin lispro (HumaLOG) corrective regimen sliding scale   SubCutaneous at bedtime  loperamide 16 milliGRAM(s) Oral <User Schedule>  octreotide  Injectable 100 MICROGram(s) SubCutaneous three times a day  opium Tincture 6 milliGRAM(s) Oral <User Schedule>  pantoprazole  Injectable 40 milliGRAM(s) IV Push daily  Parenteral Nutrition - Adult 1 Each TPN Continuous <Continuous>  Parenteral Nutrition - Adult 1 Each TPN Continuous <Continuous>  sodium chloride 0.9%. 1000 milliLiter(s) IV Continuous <Continuous>      PRN INPATIENT MEDICATION  ondansetron Injectable 4 milliGRAM(s) IV Push every 6 hours PRN        VITALS/PHYSICAL EXAM  --------------------------------------------------------------------------------  T(C): 36.9 (01-08-20 @ 13:20), Max: 37.2 (01-08-20 @ 05:13)  HR: 93 (01-08-20 @ 13:20) (82 - 95)  BP: 114/70 (01-08-20 @ 13:20) (84/49 - 137/74)  RR: 19 (01-08-20 @ 13:20) (19 - 28)  SpO2: 94% (01-08-20 @ 13:20) (94% - 100%)  Wt(kg): --        01-07-20 @ 07:01  -  01-08-20 @ 07:00  --------------------------------------------------------  IN: 3641.2 mL / OUT: 4700 mL / NET: -1058.8 mL    01-08-20 @ 07:01  -  01-08-20 @ 13:29  --------------------------------------------------------  IN: 1815 mL / OUT: 1332 mL / NET: 483 mL            LABS/ CULTURES/ RADIOLOGY:              7.7    12.31 >-----------<  291      [01-08-20 @ 09:04]              24.2     139  |  97  |  16  ----------------------------<  118      [01-08-20 @ 06:53]  4.1   |  28  |  0.45        Ca     7.6     [01-08-20 @ 06:53]      Mg     1.8     [01-08-20 @ 06:53]      Phos  3.7     [01-08-20 @ 06:53]    TPro  5.4  /  Alb  2.2  /  TBili  0.6  /  DBili  0.3  /  AST  11  /  ALT  15  /  AlkPhos  73  [01-08-20 @ 06:53]    CAPILLARY BLOOD GLUCOSE  POCT Blood Glucose.: 116 mg/dL (08 Jan 2020 13:17)  POCT Blood Glucose.: 119 mg/dL (08 Jan 2020 08:55)  POCT Blood Glucose.: 151 mg/dL (07 Jan 2020 21:58)  POCT Blood Glucose.: 130 mg/dL (07 Jan 2020 15:28)    Prealbumin, Serum: 13 mg/dL (01-07-20 @ 02:35)  Prealbumin, Serum: 14 mg/dL (01-06-20 @ 07:40)  Prealbumin, Serum: 16 mg/dL (01-01-20 @ 02:48)  Prealbumin, Serum: 15 mg/dL (12-31-19 @ 03:11)  Prealbumin, Serum: 16 mg/dL (12-30-19 @ 07:53)      Triglycerides, Serum: 50 mg/dL (01.07.20 @ 01:05)  Triglycerides, Serum: 48 mg/dL (01.06.20 @ 02:44)  Triglycerides, Serum: 56 mg/dL (01.01.20 @ 00:42)  Triglycerides, Serum: 39 mg/dL (12.31.19 @ 00:56) [facility] NUTRITION SUPPORT / TPN -- FOLLOW UP NOTE  --------------------------------------------------------------------------------    24 hour events/subjective:  Pt transferred to med/ surg floor  Pt 'depressed' that he's still in hospital- offered counseling  Tolerating TPN & eating dysphagia diet - eating well       Continue Octreotide, lomotil, Imodium, & tincture of opium         high ostomy out put        PPI to help decrease gastric secretions      repletion as per SICU  72hr collection for Fecal Fat & electrolytes pending  OR Friday w/ Thoracic Surgery for Lt chest effusion  SOB improved w/ NC O2  Pt denies any pain/ dyspnea/ cough/ palp  no n/v   No f/c/s        Diet:  Diet, Dysphagia 1 Pureed-Nectar Consistency Fluid:   Supplement Feeding Modality:  Oral  Ensure Pudding Cans or Servings Per Day:  2       Frequency:  Two Times a day (01-06-20 @ 09:38)  Diet, NPO after Midnight:      NPO Start Date: 08-Jan-2020,   NPO Start Time: 23:59 (01-08-20 @ 09:25)      Appetite: [  ]Poor [  ]Adequate [  ]Good  Caloric intake:  [   ]  Adequate   [   ] Inadequate    ROS: General/ GI see HPI  all other systems negative  pt unable to offer    ALLERGIES & MEDICATIONS  --------------------------------------------------------------------------------  ALLERGIES  Allergies    IV Contrast (Hives)    Intolerances        INTOLERANCES    STANDING INPATIENT MEDICATIONS    albuterol/ipratropium for Nebulization 3 milliLiter(s) Nebulizer every 6 hours  buDESOnide    Inhalation Suspension 0.5 milliGRAM(s) Inhalation every 12 hours  chlorhexidine 2% Cloths 1 Application(s) Topical <User Schedule>  diphenoxylate/atropine 2 Tablet(s) Oral <User Schedule>  fat emulsion (Fish Oil and Plant Based) 20% Infusion 20.8 mL/Hr IV Continuous <Continuous>  insulin lispro (HumaLOG) corrective regimen sliding scale   SubCutaneous three times a day before meals  insulin lispro (HumaLOG) corrective regimen sliding scale   SubCutaneous at bedtime  loperamide 16 milliGRAM(s) Oral <User Schedule>  octreotide  Injectable 100 MICROGram(s) SubCutaneous three times a day  opium Tincture 6 milliGRAM(s) Oral <User Schedule>  pantoprazole  Injectable 40 milliGRAM(s) IV Push daily  Parenteral Nutrition - Adult 1 Each TPN Continuous <Continuous>  Parenteral Nutrition - Adult 1 Each TPN Continuous <Continuous>  sodium chloride 0.9%. 1000 milliLiter(s) IV Continuous <Continuous>      PRN INPATIENT MEDICATION  ondansetron Injectable 4 milliGRAM(s) IV Push every 6 hours PRN        VITALS/PHYSICAL EXAM  --------------------------------------------------------------------------------  T(C): 36.9 (01-08-20 @ 13:20), Max: 37.2 (01-08-20 @ 05:13)  HR: 93 (01-08-20 @ 13:20) (82 - 95)  BP: 114/70 (01-08-20 @ 13:20) (84/49 - 137/74)  RR: 19 (01-08-20 @ 13:20) (19 - 28)  SpO2: 94% (01-08-20 @ 13:20) (94% - 100%)  Wt(kg): --        01-07-20 @ 07:01  -  01-08-20 @ 07:00  --------------------------------------------------------  IN: 3641.2 mL / OUT: 4700 mL / NET: -1058.8 mL    01-08-20 @ 07:01  -  01-08-20 @ 13:29  --------------------------------------------------------  IN: 1815 mL / OUT: 1332 mL / NET: 483 mL      PHYSICAL EXAM  --------------------------------------------------------------------------------  	Gen: guarded but stable, A&Ox3, NC O2  	HEENT: NC/AT, PERRL, supple neck, clear oropharynx, dried ruptured blisters  	GI: (+) BS, softly distended, non tender                   midline incision with small superficial areas of dehiscence granulating moist                       w/o drainage, remainder of incision c/d/i also w/o drainage                   (+)ostomy pink viable- thick light brown liquid stool like material              MSK: FROM, no contractures nor deformities  	Vascular: Equally Warm, no clubbing, cyanosis,                        RUE edema greatly improved- almost resolved- w/o cord, tenderness, induration,                            erythema, fluctuance, drainage, nor blistering                       Rt PICC dressing c/d/I   	Neuro: No focal deficits, intact sensation, weakened strength  	Psych: Normal affect and mood  	Skin: Warm, without rashes, good turgor      LABS/ CULTURES/ RADIOLOGY:              7.7    12.31 >-----------<  291      [01-08-20 @ 09:04]              24.2     139  |  97  |  16  ----------------------------<  118      [01-08-20 @ 06:53]  4.1   |  28  |  0.45        Ca     7.6     [01-08-20 @ 06:53]      Mg     1.8     [01-08-20 @ 06:53]      Phos  3.7     [01-08-20 @ 06:53]    TPro  5.4  /  Alb  2.2  /  TBili  0.6  /  DBili  0.3  /  AST  11  /  ALT  15  /  AlkPhos  73  [01-08-20 @ 06:53]    CAPILLARY BLOOD GLUCOSE  POCT Blood Glucose.: 116 mg/dL (08 Jan 2020 13:17)  POCT Blood Glucose.: 119 mg/dL (08 Jan 2020 08:55)  POCT Blood Glucose.: 151 mg/dL (07 Jan 2020 21:58)  POCT Blood Glucose.: 130 mg/dL (07 Jan 2020 15:28)    Prealbumin, Serum: 13 mg/dL (01-07-20 @ 02:35)  Prealbumin, Serum: 14 mg/dL (01-06-20 @ 07:40)  Prealbumin, Serum: 16 mg/dL (01-01-20 @ 02:48)  Prealbumin, Serum: 15 mg/dL (12-31-19 @ 03:11)  Prealbumin, Serum: 16 mg/dL (12-30-19 @ 07:53)      Triglycerides, Serum: 50 mg/dL (01.07.20 @ 01:05)  Triglycerides, Serum: 48 mg/dL (01.06.20 @ 02:44)  Triglycerides, Serum: 56 mg/dL (01.01.20 @ 00:42)  Triglycerides, Serum: 39 mg/dL (12.31.19 @ 00:56) [facility] NUTRITION SUPPORT / TPN -- FOLLOW UP NOTE  --------------------------------------------------------------------------------    24 hour events/subjective:  Pt transferred to med/ surg floor  Pt 'depressed' that he's still in hospital- offered counseling  Tolerating TPN & eating dysphagia diet - eating well       Continue Octreotide, lomotil, Imodium, & tincture of opium         high ostomy out put        PPI to help decrease gastric secretions      repletion as per SICU  72hr collection for Fecal Fat & electrolytes pending  OR Friday w/ Thoracic Surgery for Lt chest effusion  SOB improved w/ NC O2  Pt denies any pain/ dyspnea/ cough/ palp  no n/v   No f/c/s        Diet:  Diet, Dysphagia 1 Pureed-Nectar Consistency Fluid:   Supplement Feeding Modality:  Oral  Ensure Pudding Cans or Servings Per Day:  2       Frequency:  Two Times a day (01-06-20 @ 09:38)  Diet, NPO after Midnight:      NPO Start Date: 08-Jan-2020,   NPO Start Time: 23:59 (01-08-20 @ 09:25)      Appetite: [  ]Poor [x  ]Adequate [  ]Good  Caloric intake:  [  x ]  Adequate   [   ] Inadequate    ROS: General/ GI see HPI  all other systems negative      ALLERGIES & MEDICATIONS  --------------------------------------------------------------------------------  ALLERGIES  IV Contrast (Hives)    STANDING INPATIENT MEDICATIONS    albuterol/ipratropium for Nebulization 3 milliLiter(s) Nebulizer every 6 hours  buDESOnide    Inhalation Suspension 0.5 milliGRAM(s) Inhalation every 12 hours  chlorhexidine 2% Cloths 1 Application(s) Topical <User Schedule>  diphenoxylate/atropine 2 Tablet(s) Oral <User Schedule>  fat emulsion (Fish Oil and Plant Based) 20% Infusion 20.8 mL/Hr IV Continuous <Continuous>  insulin lispro (HumaLOG) corrective regimen sliding scale   SubCutaneous three times a day before meals  insulin lispro (HumaLOG) corrective regimen sliding scale   SubCutaneous at bedtime  loperamide 16 milliGRAM(s) Oral <User Schedule>  octreotide  Injectable 100 MICROGram(s) SubCutaneous three times a day  opium Tincture 6 milliGRAM(s) Oral <User Schedule>  pantoprazole  Injectable 40 milliGRAM(s) IV Push daily  Parenteral Nutrition - Adult 1 Each TPN Continuous <Continuous>  Parenteral Nutrition - Adult 1 Each TPN Continuous <Continuous>  sodium chloride 0.9%. 1000 milliLiter(s) IV Continuous <Continuous>      PRN INPATIENT MEDICATION  ondansetron Injectable 4 milliGRAM(s) IV Push every 6 hours PRN        VITALS/PHYSICAL EXAM  --------------------------------------------------------------------------------  T(C): 36.9 (01-08-20 @ 13:20), Max: 37.2 (01-08-20 @ 05:13)  HR: 93 (01-08-20 @ 13:20) (82 - 95)  BP: 114/70 (01-08-20 @ 13:20) (84/49 - 137/74)  RR: 19 (01-08-20 @ 13:20) (19 - 28)  SpO2: 94% (01-08-20 @ 13:20) (94% - 100%)  Wt(kg): --        01-07-20 @ 07:01  -  01-08-20 @ 07:00  --------------------------------------------------------  IN: 3641.2 mL / OUT: 4700 mL / NET: -1058.8 mL    01-08-20 @ 07:01  -  01-08-20 @ 13:29  --------------------------------------------------------  IN: 1815 mL / OUT: 1332 mL / NET: 483 mL      PHYSICAL EXAM  --------------------------------------------------------------------------------  	Gen: guarded but stable, A&Ox3, NC O2  	HEENT: NC/AT, PERRL, supple neck, clear oropharynx, dried ruptured blisters  	GI: (+) BS, softly distended, non tender                   midline incision with small superficial areas of dehiscence granulating moist                       w/o drainage, remainder of incision c/d/i also w/o drainage                   (+)ostomy pink viable- thick light brown liquid stool like material              MSK: FROM, no contractures nor deformities  	Vascular: Equally Warm, no clubbing, cyanosis,                        RUE edema greatly improved- almost resolved- w/o cord, tenderness, induration,                            erythema, fluctuance, drainage, nor blistering                       Rt PICC dressing c/d/I   	Neuro: No focal deficits, intact sensation, weakened strength  	Psych: Normal affect and mood  	Skin: Warm, without rashes, good turgor      LABS/ CULTURES/ RADIOLOGY:              7.7    12.31 >-----------<  291      [01-08-20 @ 09:04]              24.2     139  |  97  |  16  ----------------------------<  118      [01-08-20 @ 06:53]  4.1   |  28  |  0.45        Ca     7.6     [01-08-20 @ 06:53]      Mg     1.8     [01-08-20 @ 06:53]      Phos  3.7     [01-08-20 @ 06:53]    TPro  5.4  /  Alb  2.2  /  TBili  0.6  /  DBili  0.3  /  AST  11  /  ALT  15  /  AlkPhos  73  [01-08-20 @ 06:53]    CAPILLARY BLOOD GLUCOSE  POCT Blood Glucose.: 116 mg/dL (08 Jan 2020 13:17)  POCT Blood Glucose.: 119 mg/dL (08 Jan 2020 08:55)  POCT Blood Glucose.: 151 mg/dL (07 Jan 2020 21:58)  POCT Blood Glucose.: 130 mg/dL (07 Jan 2020 15:28)    Prealbumin, Serum: 13 mg/dL (01-07-20 @ 02:35)  Prealbumin, Serum: 14 mg/dL (01-06-20 @ 07:40)  Prealbumin, Serum: 16 mg/dL (01-01-20 @ 02:48)  Prealbumin, Serum: 15 mg/dL (12-31-19 @ 03:11)  Prealbumin, Serum: 16 mg/dL (12-30-19 @ 07:53)      Triglycerides, Serum: 50 mg/dL (01.07.20 @ 01:05)  Triglycerides, Serum: 48 mg/dL (01.06.20 @ 02:44)  Triglycerides, Serum: 56 mg/dL (01.01.20 @ 00:42)  Triglycerides, Serum: 39 mg/dL (12.31.19 @ 00:56) [facility] NUTRITION SUPPORT / TPN -- FOLLOW UP NOTE  --------------------------------------------------------------------------------    24 hour events/subjective:  Pt transferred to med/ surg floor  Pt 'depressed' that he's still in hospital- offered counseling  Tolerating TPN & eating dysphagia diet - eating well       Continue Octreotide, lomotil, Imodium, & tincture of opium         high ostomy out put        PPI to help decrease gastric secretions        repletion for high out pt as per surgery  72hr collection for Fecal Fat & electrolytes pending  OR Friday w/ Thoracic Surgery for Lt chest effusion  SOB improved w/ NC O2  Pt denies any pain/ dyspnea/ cough/ palp  no n/v   No f/c/s        Diet:  Diet, Dysphagia 1 Pureed-Nectar Consistency Fluid:   Supplement Feeding Modality:  Oral  Ensure Pudding Cans or Servings Per Day:  2       Frequency:  Two Times a day (01-06-20 @ 09:38)  Diet, NPO after Midnight:      NPO Start Date: 08-Jan-2020,   NPO Start Time: 23:59 (01-08-20 @ 09:25)      Appetite: [  ]Poor [x  ]Adequate [  ]Good  Caloric intake:  [  x ]  Adequate   [   ] Inadequate    ROS: General/ GI see HPI  all other systems negative      ALLERGIES & MEDICATIONS  --------------------------------------------------------------------------------  ALLERGIES  IV Contrast (Hives)    STANDING INPATIENT MEDICATIONS    albuterol/ipratropium for Nebulization 3 milliLiter(s) Nebulizer every 6 hours  buDESOnide    Inhalation Suspension 0.5 milliGRAM(s) Inhalation every 12 hours  chlorhexidine 2% Cloths 1 Application(s) Topical <User Schedule>  diphenoxylate/atropine 2 Tablet(s) Oral <User Schedule>  fat emulsion (Fish Oil and Plant Based) 20% Infusion 20.8 mL/Hr IV Continuous <Continuous>  insulin lispro (HumaLOG) corrective regimen sliding scale   SubCutaneous three times a day before meals  insulin lispro (HumaLOG) corrective regimen sliding scale   SubCutaneous at bedtime  loperamide 16 milliGRAM(s) Oral <User Schedule>  octreotide  Injectable 100 MICROGram(s) SubCutaneous three times a day  opium Tincture 6 milliGRAM(s) Oral <User Schedule>  pantoprazole  Injectable 40 milliGRAM(s) IV Push daily  Parenteral Nutrition - Adult 1 Each TPN Continuous <Continuous>  Parenteral Nutrition - Adult 1 Each TPN Continuous <Continuous>  sodium chloride 0.9%. 1000 milliLiter(s) IV Continuous <Continuous>      PRN INPATIENT MEDICATION  ondansetron Injectable 4 milliGRAM(s) IV Push every 6 hours PRN        VITALS/PHYSICAL EXAM  --------------------------------------------------------------------------------  T(C): 36.9 (01-08-20 @ 13:20), Max: 37.2 (01-08-20 @ 05:13)  HR: 93 (01-08-20 @ 13:20) (82 - 95)  BP: 114/70 (01-08-20 @ 13:20) (84/49 - 137/74)  RR: 19 (01-08-20 @ 13:20) (19 - 28)  SpO2: 94% (01-08-20 @ 13:20) (94% - 100%)  Wt(kg): --        01-07-20 @ 07:01  -  01-08-20 @ 07:00  --------------------------------------------------------  IN: 3641.2 mL / OUT: 4700 mL / NET: -1058.8 mL    01-08-20 @ 07:01  -  01-08-20 @ 13:29  --------------------------------------------------------  IN: 1815 mL / OUT: 1332 mL / NET: 483 mL      PHYSICAL EXAM  --------------------------------------------------------------------------------  	Gen: guarded but stable, A&Ox3, NC O2  	HEENT: NC/AT, PERRL, supple neck, clear oropharynx, dried ruptured blisters  	GI: (+) BS, softly distended, non tender                   midline incision with small superficial areas of dehiscence granulating moist                       w/o drainage, remainder of incision c/d/i also w/o drainage                   (+)ostomy pink viable- thick light brown liquid stool like material              MSK: FROM, no contractures nor deformities  	Vascular: Equally Warm, no clubbing, cyanosis,                        RUE edema greatly improved- almost resolved- w/o cord, tenderness, induration,                            erythema, fluctuance, drainage, nor blistering                       Rt PICC dressing c/d/I   	Neuro: No focal deficits, intact sensation, weakened strength  	Psych: Normal affect and mood  	Skin: Warm, without rashes, good turgor      LABS/ CULTURES/ RADIOLOGY:              7.7    12.31 >-----------<  291      [01-08-20 @ 09:04]              24.2     139  |  97  |  16  ----------------------------<  118      [01-08-20 @ 06:53]  4.1   |  28  |  0.45        Ca     7.6     [01-08-20 @ 06:53]      Mg     1.8     [01-08-20 @ 06:53]      Phos  3.7     [01-08-20 @ 06:53]    TPro  5.4  /  Alb  2.2  /  TBili  0.6  /  DBili  0.3  /  AST  11  /  ALT  15  /  AlkPhos  73  [01-08-20 @ 06:53]    CAPILLARY BLOOD GLUCOSE  POCT Blood Glucose.: 116 mg/dL (08 Jan 2020 13:17)  POCT Blood Glucose.: 119 mg/dL (08 Jan 2020 08:55)  POCT Blood Glucose.: 151 mg/dL (07 Jan 2020 21:58)  POCT Blood Glucose.: 130 mg/dL (07 Jan 2020 15:28)    Prealbumin, Serum: 13 mg/dL (01-07-20 @ 02:35)  Prealbumin, Serum: 14 mg/dL (01-06-20 @ 07:40)  Prealbumin, Serum: 16 mg/dL (01-01-20 @ 02:48)  Prealbumin, Serum: 15 mg/dL (12-31-19 @ 03:11)  Prealbumin, Serum: 16 mg/dL (12-30-19 @ 07:53)      Triglycerides, Serum: 50 mg/dL (01.07.20 @ 01:05)  Triglycerides, Serum: 48 mg/dL (01.06.20 @ 02:44)  Triglycerides, Serum: 56 mg/dL (01.01.20 @ 00:42)  Triglycerides, Serum: 39 mg/dL (12.31.19 @ 00:56)

## 2020-01-08 NOTE — PROGRESS NOTE ADULT - ASSESSMENT
A/P: 74 year old male w/ PMH of COPD and EtOH dependence with PSH/o appy, prostate surgery, & spine surgery  septic shock and high grade SBO s/p exploratory laparotomy, lysis of adhesions, decompression of bowel via enterotomy w/ primary repair, and Abthera VAC placement on 12/6; s/p take down of Abthera, washout and re-application of the Abthera vac on 12/8. Now s/p SBR and end ileostomy on 12/10.   TPN consulted to assist w/ management of pt's nutrition in pt w/ prolonged hospital course now tolerating diet but has high Ileostomy output.  Pt s/p Lt Chest Pigtail for effusion.    Continue TPN at full strength in pt w/ severe Protein-Calorie Malnutrition      - high ostomy output- pt eating more regularly- continue to monitor      - Tolerating Dysphagia 1 Pureed-Nectar Consistency Fluid w/ Protein Supplements      - Strict Intake and Output- Continue  TPN & octreotide, lomotil, tincture of opium, & imodium  Pt tolerating TPN at GOAL:  120 grams amino acids  210 grams dextrose   50 grams SMOFlipid   in 1800mL volume  Micronutrients: 10ml MVI, 3ml MTE-5    HypoNa  improving & will continue to monitor-maintaining K levels will help maintain Na  Elevated K  improving, will maintain KCl of 40 mEq KCl in TPN          possibly due to Octreotide  HypoPhos resolving, continue NaPhos of 45mMol   Fecal fat testing- specimen collection- 72hr fecal studies- pending        electrolytes & fat content to be assessed -see what pt is absorbing        to be better able to assess ongoing nutritional needs in pt with high ostomy output that is concerning             for Short Gut Syndrome   Improved glycemic control  - Insulin removed from TPN      Fingersticks & ISS coverage - to be ordered by primary team  RUE swelling - continue to improve arm elevation, monitoring-holding off Duplex as per SICU  OR Friday w/ Thoracic for  Lt chest effusion- still high drainage  Offered counseling for depressed feelings - will think about it  PICC w/dedicated port for only TPN - maintenance as per protocol  Weights three times a week  Monitor BMP, Mg, Ionized Ca, Phosphorus daily  Continue to monitor Triglycerides and Pre-albumin weekly  Continue as per Surgery, will follow with you, D/w primary team    Andreina Hubbard PA-C  TPN team, pager [phone]  D/w Ana M Siegel

## 2020-01-08 NOTE — PROGRESS NOTE ADULT - ASSESSMENT
73 y/o M presenting with septic shock and high grade SBO s/p exploratory laparotomy, lysis of adhesions, decompression of bowel via enterotomy w/ primary repair, and Abthera VAC placement on 12/6; s/p take down of Abthera, washout and re-application of the Abthera vac on 12/8. 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 s/p L chest tube placement by IR 12/30 for pleural effusion.     Plan   - pain control   - continue dysphagia diet   - pleural effusions : plan for  Friday for pleurex  - OOB as tolerated   - monitor high chest tube and ileostomy op.  - 1:1 repletions  - cont TPN  - back rash, cont creams. Poss derm consult if not improved    [id number] 73 y/o M presenting with septic shock and high grade SBO s/p exploratory laparotomy, lysis of adhesions, decompression of bowel via enterotomy w/ primary repair, and Abthera VAC placement on 12/6; s/p take down of Abthera, washout and re-application of the Abthera vac on 12/8. 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 s/p L chest tube placement by IR 12/30 for pleural effusion.     Plan   - pain control   - continue dysphagia diet   - pleural effusions : plan for  Friday 1/10 for pleurex  - OOB as tolerated with PT  - monitor high chest tube and ileostomy output.  - 1:1 repletions  - cont TPN  - back rash, cont creams. Poss derm consult if not improved    [id number]

## 2020-01-09 ENCOUNTER — TRANSCRIPTION ENCOUNTER (OUTPATIENT)
Age: 75
End: 2020-01-09

## 2020-01-09 LAB
ALBUMIN SERPL ELPH-MCNC: 2.3 G/DL — LOW (ref 3.3–5)
ALP SERPL-CCNC: 79 U/L — SIGNIFICANT CHANGE UP (ref 40–120)
ALT FLD-CCNC: 15 U/L — SIGNIFICANT CHANGE UP (ref 10–45)
ANION GAP SERPL CALC-SCNC: 7 MMOL/L — SIGNIFICANT CHANGE UP (ref 5–17)
AST SERPL-CCNC: 14 U/L — SIGNIFICANT CHANGE UP (ref 10–40)
BILIRUB SERPL-MCNC: 0.6 MG/DL — SIGNIFICANT CHANGE UP (ref 0.2–1.2)
BLD GP AB SCN SERPL QL: NEGATIVE — SIGNIFICANT CHANGE UP
BUN SERPL-MCNC: 16 MG/DL — SIGNIFICANT CHANGE UP (ref 7–23)
CA-I BLD-SCNC: 1.12 MMOL/L — SIGNIFICANT CHANGE UP (ref 1.12–1.3)
CALCIUM SERPL-MCNC: 7.8 MG/DL — LOW (ref 8.4–10.5)
CHLORIDE SERPL-SCNC: 101 MMOL/L — SIGNIFICANT CHANGE UP (ref 96–108)
CO2 SERPL-SCNC: 29 MMOL/L — SIGNIFICANT CHANGE UP (ref 22–31)
CREAT SERPL-MCNC: 0.44 MG/DL — LOW (ref 0.5–1.3)
GLUCOSE BLDC GLUCOMTR-MCNC: 109 MG/DL — HIGH (ref 70–99)
GLUCOSE BLDC GLUCOMTR-MCNC: 115 MG/DL — HIGH (ref 70–99)
GLUCOSE BLDC GLUCOMTR-MCNC: 133 MG/DL — HIGH (ref 70–99)
GLUCOSE BLDC GLUCOMTR-MCNC: 134 MG/DL — HIGH (ref 70–99)
GLUCOSE SERPL-MCNC: 109 MG/DL — HIGH (ref 70–99)
HCT VFR BLD CALC: 25.9 % — LOW (ref 39–50)
HGB BLD-MCNC: 8 G/DL — LOW (ref 13–17)
MAGNESIUM SERPL-MCNC: 1.8 MG/DL — SIGNIFICANT CHANGE UP (ref 1.6–2.6)
MCHC RBC-ENTMCNC: 30 PG — SIGNIFICANT CHANGE UP (ref 27–34)
MCHC RBC-ENTMCNC: 30.9 GM/DL — LOW (ref 32–36)
MCV RBC AUTO: 97 FL — SIGNIFICANT CHANGE UP (ref 80–100)
PHOSPHATE SERPL-MCNC: 3.4 MG/DL — SIGNIFICANT CHANGE UP (ref 2.5–4.5)
PLATELET # BLD AUTO: 286 K/UL — SIGNIFICANT CHANGE UP (ref 150–400)
POTASSIUM SERPL-MCNC: 4.1 MMOL/L — SIGNIFICANT CHANGE UP (ref 3.5–5.3)
POTASSIUM SERPL-SCNC: 4.1 MMOL/L — SIGNIFICANT CHANGE UP (ref 3.5–5.3)
PROT SERPL-MCNC: 5.7 G/DL — LOW (ref 6–8.3)
RBC # BLD: 2.67 M/UL — LOW (ref 4.2–5.8)
RBC # FLD: 15.2 % — HIGH (ref 10.3–14.5)
RH IG SCN BLD-IMP: POSITIVE — SIGNIFICANT CHANGE UP
SODIUM SERPL-SCNC: 137 MMOL/L — SIGNIFICANT CHANGE UP (ref 135–145)
WBC # BLD: 10.79 K/UL — HIGH (ref 3.8–10.5)
WBC # FLD AUTO: 10.79 K/UL — HIGH (ref 3.8–10.5)

## 2020-01-09 PROCEDURE — 99232 SBSQ HOSP IP/OBS MODERATE 35: CPT

## 2020-01-09 PROCEDURE — 71045 X-RAY EXAM CHEST 1 VIEW: CPT | Mod: 26

## 2020-01-09 PROCEDURE — 99231 SBSQ HOSP IP/OBS SF/LOW 25: CPT

## 2020-01-09 RX ORDER — MAGNESIUM SULFATE 500 MG/ML
2 VIAL (ML) INJECTION ONCE
Refills: 0 | Status: COMPLETED | OUTPATIENT
Start: 2020-01-09 | End: 2020-01-09

## 2020-01-09 RX ORDER — ELECTROLYTE SOLUTION,INJ
1 VIAL (ML) INTRAVENOUS
Refills: 0 | Status: DISCONTINUED | OUTPATIENT
Start: 2020-01-09 | End: 2020-01-09

## 2020-01-09 RX ORDER — CHOLESTYRAMINE 4 G/9G
4 POWDER, FOR SUSPENSION ORAL
Refills: 0 | Status: DISCONTINUED | OUTPATIENT
Start: 2020-01-09 | End: 2020-01-10

## 2020-01-09 RX ORDER — I.V. FAT EMULSION 20 G/100ML
20.8 EMULSION INTRAVENOUS
Qty: 50 | Refills: 0 | Status: DISCONTINUED | OUTPATIENT
Start: 2020-01-09 | End: 2020-01-10

## 2020-01-09 RX ADMIN — MORPHINE 6 MILLIGRAM(S): 10 SOLUTION ORAL at 23:24

## 2020-01-09 RX ADMIN — Medication 1 EACH: at 18:05

## 2020-01-09 RX ADMIN — CHOLESTYRAMINE 4 GRAM(S): 4 POWDER, FOR SUSPENSION ORAL at 13:14

## 2020-01-09 RX ADMIN — Medication 3 MILLILITER(S): at 05:14

## 2020-01-09 RX ADMIN — CHLORHEXIDINE GLUCONATE 1 APPLICATION(S): 213 SOLUTION TOPICAL at 17:57

## 2020-01-09 RX ADMIN — OCTREOTIDE ACETATE 100 MICROGRAM(S): 200 INJECTION, SOLUTION INTRAVENOUS; SUBCUTANEOUS at 13:24

## 2020-01-09 RX ADMIN — Medication 0.5 MILLIGRAM(S): at 17:58

## 2020-01-09 RX ADMIN — MORPHINE 6 MILLIGRAM(S): 10 SOLUTION ORAL at 08:59

## 2020-01-09 RX ADMIN — I.V. FAT EMULSION 20.8 ML/HR: 20 EMULSION INTRAVENOUS at 18:05

## 2020-01-09 RX ADMIN — Medication 16 MILLIGRAM(S): at 23:24

## 2020-01-09 RX ADMIN — Medication 2 TABLET(S): at 23:24

## 2020-01-09 RX ADMIN — Medication 2 TABLET(S): at 13:21

## 2020-01-09 RX ADMIN — Medication 50 GRAM(S): at 13:21

## 2020-01-09 RX ADMIN — OCTREOTIDE ACETATE 100 MICROGRAM(S): 200 INJECTION, SOLUTION INTRAVENOUS; SUBCUTANEOUS at 05:14

## 2020-01-09 RX ADMIN — Medication 16 MILLIGRAM(S): at 17:59

## 2020-01-09 RX ADMIN — MORPHINE 6 MILLIGRAM(S): 10 SOLUTION ORAL at 13:28

## 2020-01-09 RX ADMIN — Medication 16 MILLIGRAM(S): at 09:00

## 2020-01-09 RX ADMIN — CHOLESTYRAMINE 4 GRAM(S): 4 POWDER, FOR SUSPENSION ORAL at 23:29

## 2020-01-09 RX ADMIN — PANTOPRAZOLE SODIUM 40 MILLIGRAM(S): 20 TABLET, DELAYED RELEASE ORAL at 13:14

## 2020-01-09 RX ADMIN — Medication 3 MILLILITER(S): at 17:58

## 2020-01-09 RX ADMIN — Medication 3 MILLILITER(S): at 23:24

## 2020-01-09 RX ADMIN — Medication 2 TABLET(S): at 18:04

## 2020-01-09 RX ADMIN — Medication 16 MILLIGRAM(S): at 13:13

## 2020-01-09 RX ADMIN — OCTREOTIDE ACETATE 100 MICROGRAM(S): 200 INJECTION, SOLUTION INTRAVENOUS; SUBCUTANEOUS at 23:24

## 2020-01-09 RX ADMIN — MORPHINE 6 MILLIGRAM(S): 10 SOLUTION ORAL at 18:04

## 2020-01-09 RX ADMIN — Medication 0.5 MILLIGRAM(S): at 05:14

## 2020-01-09 RX ADMIN — Medication 2 TABLET(S): at 08:59

## 2020-01-09 RX ADMIN — Medication 3 MILLILITER(S): at 13:14

## 2020-01-09 NOTE — PROGRESS NOTE ADULT - PROBLEM SELECTOR PLAN 1
sp  IR placed lt pigtail  keep lws - clamped on 1/9   daily chest xray while pl tube in place  continue monitor chest tube output  last lovenox dose thursday am   for procedure,   will clamp tube on thursday  Planning for pleurex cath placement on Friday 1/10 w/ Dr. Mayes

## 2020-01-09 NOTE — PROGRESS NOTE ADULT - ASSESSMENT
A/P: 74 year old male w/ PMH of COPD and EtOH dependence with PSH/o appy, prostate surgery, & spine surgery  septic shock and high grade SBO s/p exploratory laparotomy, lysis of adhesions, decompression of bowel via enterotomy w/ primary repair, and Abthera VAC placement on 12/6; s/p take down of Abthera, washout and re-application of the Abthera vac on 12/8. Now s/p SBR and end ileostomy on 12/10.   TPN consulted to assist w/ management of pt's nutrition in pt w/ prolonged hospital course now tolerating diet but has high Ileostomy output.  Pt s/p Lt Chest Pigtail for effusion.    Continue TPN at full strength in pt w/ severe Protein-Calorie Malnutrition      - high ostomy output- pt eating more regularly- continue to monitor            Surgery monitoring and repleting w/ IVF prn      - Tolerating Dysphagia 1 Pureed-Nectar Consistency Fluid w/ Protein Supplements      - Strict Intake and Output- Continue  TPN & octreotide, lomotil, tincture of opium, & imodium  Pt tolerating TPN at GOAL:  120 grams amino acids  210 grams dextrose   50 grams SMOF /lipid   in 1800mL volume  Micronutrients: 10ml MVI, 3ml MTE-5    HypoNa  improving & will continue to monitor-maintaining K levels will help maintain Na  Elevated K  improving, will maintain KCl of 40 mEq KCl in TPN          possibly due to Octreotide  HypoPhos resolving, continue NaPhos of 45mMol   Fecal fat testing- specimen collection- 72hr fecal studies- pending        electrolytes & fat content to be assessed -see what pt is absorbing        to be better able to assess ongoing nutritional needs in pt with high ostomy output that is concerning             for Short Gut Syndrome   Improved glycemic control  - Insulin removed from TPN      Fingersticks & ISS coverage - to be ordered by primary team  RUE swelling - improved w/ arm elevation, monitoring  OR Friday w/ Thoracic for  Lt chest effusion- still high drainage  Offered counseling for depressed feelings - will think about it  PICC w/dedicated port for only TPN - maintenance as per protocol  Weights three times a week  Monitor BMP, Mg, Ionized Ca, Phosphorus daily  Continue to monitor Triglycerides and Pre-albumin weekly  Continue as per Surgery, will follow with you, D/w primary team    Andreina Hubbard PA-C  TPN team, pager [phone]  D/w Ana M Siegel A/P: 74 year old male w/ PMH of COPD and EtOH dependence with PSH/o appy, prostate surgery, & spine surgery  septic shock and high grade SBO s/p exploratory laparotomy, lysis of adhesions, decompression of bowel via enterotomy w/ primary repair, and Abthera VAC placement on 12/6; s/p take down of Abthera, washout and re-application of the Abthera vac on 12/8. Now s/p SBR and end ileostomy on 12/10.   TPN consulted to assist w/ management of pt's nutrition in pt w/ prolonged hospital course now tolerating diet but has high Ileostomy output.  Pt s/p Lt Chest Pigtail for effusion.    Continue TPN at full strength in pt w/ severe Protein-Calorie Malnutrition      - high ostomy output- pt eating more regularly- continue to monitor            Surgery monitoring and repleting w/ IVF prn      - Tolerating Dysphagia 1 Pureed-Nectar Consistency Fluid w/ Protein Supplements      - Strict Intake and Output- Continue  TPN & octreotide, lomotil, tincture of opium, & imodium  Pt tolerating TPN at GOAL:  120 grams amino acids  210 grams dextrose   50 grams SMOF /lipid   in 1800mL volume  Micronutrients: 10ml MVI, 3ml MTE-5    HypoNa  improving & will continue to monitor-maintaining K levels will help maintain Na  Elevated K  improving, will maintain KCl of 40 mEq KCl in TPN          possibly due to Octreotide  HypoPhos resolving, continue NaPhos of 45mMol   Fecal fat testing- specimen collection- 72hr fecal studies- pending        electrolytes & fat content to be assessed -see what pt is absorbing        to be better able to assess ongoing nutritional needs in pt with high ostomy output that is concerning             for Short Gut Syndrome   Improved glycemic control  - Insulin removed from TPN      Fingersticks & ISS coverage - to be ordered by primary team  RUE swelling - improved w/ arm elevation, monitoring  OR Friday w/ Thoracic for Lt chest effusion- still high drainage from pigtail cath  Offered counseling for depressed feelings - will think about it  PICC w/dedicated port for only TPN - maintenance as per protocol  Weights three times a week  Monitor BMP, Mg, Ionized Ca, Phosphorus daily  Continue to monitor Triglycerides and Pre-albumin weekly  Continue as per Surgery, will follow with you, D/w primary team    Andreina Hubbard PA-C  TPN team, pager [phone]  D/w Ana M Siegel

## 2020-01-09 NOTE — PROGRESS NOTE ADULT - ASSESSMENT
75 y/o M presenting with septic shock and high grade SBO s/p exploratory laparotomy, lysis of adhesions, decompression of bowel via enterotomy w/ primary repair, and Abthera VAC placement on 12/6; s/p take down of Abthera, washout and re-application of the Abthera vac on 12/8. Now s/p SBR and end ileostomy on 12/10 acute respiratory distress now improving, Pneumothorax, hyperglycemia, delirium. Now still with persistent pneumothorax when chest pigtail clamped.   12/23 VSS on room air recommending IR drainage of left chest  12/24 No evidence of air leak to left chest tube. Tube clamped. Repeat chest cxr in 4 hours. Anticipate chest tube removal if lung remains expanded. Drainage of loculated left effusion discussed with IR. IR to reconsult for drainage once initial tube removed. Discussed plan with Dr Mayes and IR Fellow  12/24  On NC ,    VSSchest xray sm lt pl eff, left chest tube dc'd.  12/26    2l NC   co  sob,  lt side diminshed  12/27 VSS, CXR with unchanged loculated left pleural effusion- IR consulted for pigtail placement, states effusion can be drained via thoracentesis, pigtail placement not indicated at this time - Dr. Mayes to speak with IR attending.   12/30 Patient with persistent PTX  and left pleural effusion now for IR drainage with Dr Elkins.   12/31 HD stable, L PTC w/ high output, serosanguinous drainage, 990 ml overnight/ 2200 in 24 hrs. Tolerating 2L NC supplemental O2, Cyto pending, continue monitor drainage output.   1/1: LPTC still with High output-400/24h. Continue with pigtail cath drain.  1/ 2     lt pigtail draining  on lws  1/3 HD stable, left pigtail catheter continues to drain. Incentive spirometer encouraged, OOB and mobility, continue pulmonary toileting. Maintain chest tube to suction and monitor output.   1/4: Chest tube milked to prevent clogging of tube, Continues to drain with high out put.  1/5 remains with high out put 170/700. Continue drainage  1/6  persistent drainage from lt pl tube  1/7 Left pigtail 150/450. Daily CXR. Planning for pleurex cath placement on Friday 1/10 w/ Dr. Mayes  1/8        Lt pleural tube   persistent draining    1/9 preop for pleurX placement tomorrow. CT clamped. NPO after MN, type and cross x2.

## 2020-01-09 NOTE — PROGRESS NOTE ADULT - SUBJECTIVE AND OBJECTIVE BOX
[facility] AM Progress Note    STATUS POST:  Insertion, chest tube, left pleural cavity  Colectomy, partial, with mucous fistula and colostomy or ileostomy creation  End ileostomy  Abdominal washout  Lysis of intestinal adhesions  Exploratory laparotomy      S: Pt seen and examined. Reports rash on back for few days, denies feeling itchy.  Reports associated tingling.  Relieved with creams. Reports tolerating diet without issues.  Denies N/V/ abd pain.    Vital Signs Last 24 Hrs  T(C): 37 (09 Jan 2020 10:20), Max: 37.2 (09 Jan 2020 00:56)  T(F): 98.6 (09 Jan 2020 10:20), Max: 98.9 (09 Jan 2020 00:56)  HR: 87 (09 Jan 2020 10:20) (87 - 94)  BP: 157/79 (09 Jan 2020 10:20) (95/56 - 157/79)  RR: 18 (09 Jan 2020 10:20) (18 - 19)  SpO2: 94% (09 Jan 2020 10:20) (94% - 99%)    I&O's Summary    08 Jan 2020 07:01  -  09 Jan 2020 07:00  --------------------------------------------------------  IN: 5985 mL / OUT: 6122 mL / NET: -137 mL    09 Jan 2020 07:01  -  09 Jan 2020 12:21  --------------------------------------------------------  IN: 240 mL / OUT: 950 mL / NET: -710 mL         Physical Exam  General: Appears well, NAD  CHEST: breathing comfortably, L CT SS  CV: appears well perfused  Abdomen: soft, nontender, nondistended, no rebound or guarding, midline incision mild pink erythema improving no purulence, ileostomy +/+  Extremities: Grossly symmetric      LABS:                        8.0    10.79 )-----------( 286      ( 09 Jan 2020 09:36 )             25.9     01-09    137  |  101  |  16  ----------------------------<  109<H>  4.1   |  29  |  0.44<L>    Ca    7.8<L>      09 Jan 2020 05:56  Phos  3.4     01-09  Mg     1.8     01-09    TPro  5.7<L>  /  Alb  2.3<L>  /  TBili  0.6  /  DBili  x   /  AST  14  /  ALT  15  /  AlkPhos  79  01-09          RADIOLOGY & ADDITIONAL STUDIES:

## 2020-01-09 NOTE — PROGRESS NOTE ADULT - ASSESSMENT
73 y/o M presenting with septic shock and high grade SBO s/p exploratory laparotomy, lysis of adhesions, decompression of bowel via enterotomy w/ primary repair, and Abthera VAC placement on 12/6; s/p take down of Abthera, washout and re-application of the Abthera vac on 12/8. 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 s/p L chest tube placement by IR 12/30 for pleural effusion.      Plan   - pain control   - continue dysphagia diet   - pleural effusions : plan for Friday 1/10 for pleurex  - OOB as tolerated with PT  - monitor high chest tube and ileostomy output.  - cont 1:1 repletions  - cont TPN  - back rash, cont creams.       C Copper PA-C  [facility]    [id number]

## 2020-01-09 NOTE — PROGRESS NOTE ADULT - SUBJECTIVE AND OBJECTIVE BOX
Subjective: Patient seen and examined. No new events except as noted.   Feels ok     REVIEW OF SYSTEMS:    CONSTITUTIONAL: No weakness, fevers or chills  EYES/ENT: No visual changes;  No vertigo or throat pain   NECK: No pain or stiffness  RESPIRATORY: No cough, wheezing, hemoptysis; No shortness of breath  CARDIOVASCULAR: No chest pain or palpitations  GASTROINTESTINAL: No abdominal or epigastric pain. No nausea, vomiting, or hematemesis; No diarrhea or constipation. No melena or hematochezia.  GENITOURINARY: No dysuria, frequency or hematuria  NEUROLOGICAL: No numbness or weakness  SKIN: No itching, burning, rashes, or lesions   All other review of systems is negative unless indicated above.    MEDICATIONS:  MEDICATIONS  (STANDING):  albuterol/ipratropium for Nebulization 3 milliLiter(s) Nebulizer every 6 hours  buDESOnide    Inhalation Suspension 0.5 milliGRAM(s) Inhalation every 12 hours  chlorhexidine 2% Cloths 1 Application(s) Topical <User Schedule>  cholestyramine Powder (Sugar-Free) 4 Gram(s) Oral two times a day  diphenoxylate/atropine 2 Tablet(s) Oral <User Schedule>  fat emulsion (Fish Oil and Plant Based) 20% Infusion 20.8 mL/Hr (20.8 mL/Hr) IV Continuous <Continuous>  insulin lispro (HumaLOG) corrective regimen sliding scale   SubCutaneous three times a day before meals  insulin lispro (HumaLOG) corrective regimen sliding scale   SubCutaneous at bedtime  loperamide 16 milliGRAM(s) Oral <User Schedule>  octreotide  Injectable 100 MICROGram(s) SubCutaneous three times a day  opium Tincture 6 milliGRAM(s) Oral <User Schedule>  pantoprazole  Injectable 40 milliGRAM(s) IV Push daily  Parenteral Nutrition - Adult 1 Each (75 mL/Hr) TPN Continuous <Continuous>  Parenteral Nutrition - Adult 1 Each (75 mL/Hr) TPN Continuous <Continuous>  sodium chloride 0.9%. 1000 milliLiter(s) (10 mL/Hr) IV Continuous <Continuous>      PHYSICAL EXAM:  T(C): 37 (01-09-20 @ 10:20), Max: 37.2 (01-09-20 @ 00:56)  HR: 87 (01-09-20 @ 10:20) (87 - 94)  BP: 157/79 (01-09-20 @ 10:20) (95/56 - 157/79)  RR: 18 (01-09-20 @ 10:20) (18 - 19)  SpO2: 94% (01-09-20 @ 10:20) (94% - 99%)  Wt(kg): --  I&O's Summary    08 Jan 2020 07:01  -  09 Jan 2020 07:00  --------------------------------------------------------  IN: 5985 mL / OUT: 6122 mL / NET: -137 mL    09 Jan 2020 07:01  -  09 Jan 2020 12:01  --------------------------------------------------------  IN: 240 mL / OUT: 950 mL / NET: -710 mL            Appearance: NAD   HEENT:   Dry oral mucosa, crusted lips   Lymphatic: No lymphadenopathy   Cardiovascular: Normal S1 S2, No JVD, No murmurs , Peripheral pulses palpable 2+ bilaterally  Respiratory: Decreased bs   Gastrointestinal:  Soft, NT, ND. Ostomy pink with output. Midline staples in place, midline incision site is c/d/i   Skin: No rashes, No ecchymoses, No cyanosis, warm to touch  Musculoskeletal: Decreased  range of motion and strength  Psychiatry:  mildly sedated   Ext: No edema +PICC line   +rosas     LABS:    CARDIAC MARKERS:                                8.0    10.79 )-----------( 286      ( 09 Jan 2020 09:36 )             25.9     01-09    137  |  101  |  16  ----------------------------<  109<H>  4.1   |  29  |  0.44<L>    Ca    7.8<L>      09 Jan 2020 05:56  Phos  3.4     01-09  Mg     1.8     01-09    TPro  5.7<L>  /  Alb  2.3<L>  /  TBili  0.6  /  DBili  x   /  AST  14  /  ALT  15  /  AlkPhos  79  01-09    proBNP:   Lipid Profile:   HgA1c:   TSH:             TELEMETRY: 	    ECG:  	  RADIOLOGY:   DIAGNOSTIC TESTING:  [ ] Echocardiogram:  [ ]  Catheterization:  [ ] Stress Test:    OTHER:

## 2020-01-09 NOTE — PROGRESS NOTE ADULT - SUBJECTIVE AND OBJECTIVE BOX
[facility] NUTRITION SUPPORT / TPN -- FOLLOW UP NOTE  --------------------------------------------------------------------------------    24 hour events/subjective:  Pt transferred to med/ surg floor  Pt 'depressed' that he's still in hospital- offered counseling  Tolerating TPN & eating dysphagia diet - eating well       Continue Octreotide, lomotil, Imodium, & tincture of opium         high ostomy out put        PPI to help decrease gastric secretions      repletion as per SICU  72hr collection for Fecal Fat & electrolytes pending  OR Friday w/ Thoracic Surgery for Lt chest effusion  SOB improved w/ NC O2  Pt denies any pain/ dyspnea/ cough/ palp  no n/v   No f/c/s          Diet:  Diet, Dysphagia 1 Pureed-Nectar Consistency Fluid:   Supplement Feeding Modality:  Oral  Ensure Pudding Cans or Servings Per Day:  2       Frequency:  Two Times a day (01-06-20 @ 09:38)  Diet, NPO after Midnight:      NPO Start Date: 09-Jan-2020,   NPO Start Time: 23:59 (01-09-20 @ 07:24)      Appetite: [  ]Poor [x  ]Adequate [  ]Good  Caloric intake:  [  x ]  Adequate   [   ] Inadequate    ROS: General/ GI see HPI  all other systems negative      ALLERGIES & MEDICATIONS  --------------------------------------------------------------------------------  ALLERGIES  IV Contrast (Hives)    STANDING INPATIENT MEDICATIONS    albuterol/ipratropium for Nebulization 3 milliLiter(s) Nebulizer every 6 hours  buDESOnide    Inhalation Suspension 0.5 milliGRAM(s) Inhalation every 12 hours  chlorhexidine 2% Cloths 1 Application(s) Topical <User Schedule>  cholestyramine Powder (Sugar-Free) 4 Gram(s) Oral two times a day  diphenoxylate/atropine 2 Tablet(s) Oral <User Schedule>  fat emulsion (Fish Oil and Plant Based) 20% Infusion 20.8 mL/Hr IV Continuous <Continuous>  insulin lispro (HumaLOG) corrective regimen sliding scale   SubCutaneous three times a day before meals  insulin lispro (HumaLOG) corrective regimen sliding scale   SubCutaneous at bedtime  loperamide 16 milliGRAM(s) Oral <User Schedule>  magnesium sulfate  IVPB 2 Gram(s) IV Intermittent once  octreotide  Injectable 100 MICROGram(s) SubCutaneous three times a day  opium Tincture 6 milliGRAM(s) Oral <User Schedule>  pantoprazole  Injectable 40 milliGRAM(s) IV Push daily  Parenteral Nutrition - Adult 1 Each TPN Continuous <Continuous>  Parenteral Nutrition - Adult 1 Each TPN Continuous <Continuous>  sodium chloride 0.9%. 1000 milliLiter(s) IV Continuous <Continuous>      PRN INPATIENT MEDICATION  hydrocortisone 1% Cream 1 Application(s) Topical daily PRN  ondansetron Injectable 4 milliGRAM(s) IV Push every 6 hours PRN        VITALS/PHYSICAL EXAM  --------------------------------------------------------------------------------  T(C): 37 (01-09-20 @ 10:20), Max: 37.2 (01-09-20 @ 00:56)  HR: 87 (01-09-20 @ 10:20) (87 - 94)  BP: 157/79 (01-09-20 @ 10:20) (95/56 - 157/79)  RR: 18 (01-09-20 @ 10:20) (18 - 19)  SpO2: 94% (01-09-20 @ 10:20) (94% - 99%)  Wt(kg): --        01-08-20 @ 07:01  -  01-09-20 @ 07:00  --------------------------------------------------------  IN: 5985 mL / OUT: 6122 mL / NET: -137 mL    01-09-20 @ 07:01  -  01-09-20 @ 13:02  --------------------------------------------------------  IN: 240 mL / OUT: 950 mL / NET: -710 mL      PHYSICAL EXAM  --------------------------------------------------------------------------------  	Gen: guarded but stable, A&Ox3, NC O2  	HEENT: NC/AT, PERRL, supple neck, clear oropharynx, dried ruptured blisters  	GI: (+) BS, softly distended, non tender                   midline incision with small superficial areas of dehiscence granulating moist w/o drainage,                        remainder of incision c/d/i also w/o drainage                   (+)ostomy pink viable- thick light brown liquid stool like material              MSK: FROM, no contractures nor deformities  	Vascular: Equally Warm, no clubbing, cyanosis,                        RUE edema greatly improved- almost resolved- w/o cord, tenderness, induration, erythema,                              fluctuance, drainage, nor blistering                       Rt PICC dressing c/d/I   	Neuro: No focal deficits, intact sensation, weakened strength  	Psych: Normal affect and mood  	Skin: Warm, without rashes, good turgor      LABS/ CULTURES/ RADIOLOGY:              8.0    10.79 >-----------<  286      [01-09-20 @ 09:36]              25.9     137  |  101  |  16  ----------------------------<  109      [01-09-20 @ 05:56]  4.1   |  29  |  0.44        Ca     7.8     [01-09-20 @ 05:56]      iCa    1.12     [01-09 @ 06:04]      Mg     1.8     [01-09-20 @ 05:56]      Phos  3.4     [01-09-20 @ 05:56]    TPro  5.7  /  Alb  2.3  /  TBili  0.6  /  DBili  x   /  AST  14  /  ALT  15  /  AlkPhos  79  [01-09-20 @ 05:56]    CAPILLARY BLOOD GLUCOSE  POCT Blood Glucose.: 115 mg/dL (09 Jan 2020 12:45)  POCT Blood Glucose.: 109 mg/dL (09 Jan 2020 09:04)  POCT Blood Glucose.: 113 mg/dL (08 Jan 2020 21:41)  POCT Blood Glucose.: 114 mg/dL (08 Jan 2020 18:08)  POCT Blood Glucose.: 116 mg/dL (08 Jan 2020 13:17)    Prealbumin, Serum: 13 mg/dL (01-07-20 @ 02:35)  Prealbumin, Serum: 14 mg/dL (01-06-20 @ 07:40)  Prealbumin, Serum: 16 mg/dL (01-01-20 @ 02:48)  Prealbumin, Serum: 15 mg/dL (12-31-19 @ 03:11)  Prealbumin, Serum: 16 mg/dL (12-30-19 @ 07:53)        ASSESSMENT/PLAN:  75y Male      1. Protein calorie malnutrition being optimized with TPN:  AA  [ ] gm. CHO [ ] gm. Lipids [ ] gm.  2.  Hyperglycemia managed with: [ ] units of regular insulin    3. Check fluid balance daily.  Strict I/O  [ ] [ ]   4.  Daily BMP, Ionized Calcium, Magnesium and Phosphorous   5.  Triglycerides x 3 days at initiation of TPN then monthly [ ]     TPN pager [phone], spectra [id number]  d/w Dr. Chacko and Dr. MELISSA Siegel [facility] NUTRITION SUPPORT / TPN -- FOLLOW UP NOTE  --------------------------------------------------------------------------------    24 hour events/subjective:  **Pt for OR tomorrow.   Please DO NOT stop TPN.  If TPN needs to be stopped or runs out- Please start D10 at the same rate. A new bag of TPN will be ordered for as per protocol.**     Tolerating TPN & eating dysphagia diet - eating well       Continue Octreotide, lomotil, Imodium, & tincture of opium         high ostomy out put        PPI to help decrease gastric secretions       repletion as per surgery  72hr collection for Fecal Fat & electrolytes pending  OR Friday w/ Thoracic Surgery for Lt chest effusion  SOB improved w/ NC O2  Pt denies any pain/ dyspnea/ cough/ palp  no n/v   No f/c/s      Diet:  Diet, Dysphagia 1 Pureed-Nectar Consistency Fluid:   Supplement Feeding Modality:  Oral  Ensure Pudding Cans or Servings Per Day:  2       Frequency:  Two Times a day (01-06-20 @ 09:38)  Diet, NPO after Midnight:      NPO Start Date: 09-Jan-2020,   NPO Start Time: 23:59 (01-09-20 @ 07:24)      Appetite: [  ]Poor [  ]Adequate [x  ]Good  Caloric intake:  [  x ]  Adequate   [   ] Inadequate    ROS: General/ GI see HPI  all other systems negative      ALLERGIES & MEDICATIONS  --------------------------------------------------------------------------------  ALLERGIES  IV Contrast (Hives)    STANDING INPATIENT MEDICATIONS    albuterol/ipratropium for Nebulization 3 milliLiter(s) Nebulizer every 6 hours  buDESOnide    Inhalation Suspension 0.5 milliGRAM(s) Inhalation every 12 hours  chlorhexidine 2% Cloths 1 Application(s) Topical <User Schedule>  cholestyramine Powder (Sugar-Free) 4 Gram(s) Oral two times a day  diphenoxylate/atropine 2 Tablet(s) Oral <User Schedule>  fat emulsion (Fish Oil and Plant Based) 20% Infusion 20.8 mL/Hr IV Continuous <Continuous>  insulin lispro (HumaLOG) corrective regimen sliding scale   SubCutaneous three times a day before meals  insulin lispro (HumaLOG) corrective regimen sliding scale   SubCutaneous at bedtime  loperamide 16 milliGRAM(s) Oral <User Schedule>  magnesium sulfate  IVPB 2 Gram(s) IV Intermittent once  octreotide  Injectable 100 MICROGram(s) SubCutaneous three times a day  opium Tincture 6 milliGRAM(s) Oral <User Schedule>  pantoprazole  Injectable 40 milliGRAM(s) IV Push daily  Parenteral Nutrition - Adult 1 Each TPN Continuous <Continuous>  Parenteral Nutrition - Adult 1 Each TPN Continuous <Continuous>  sodium chloride 0.9%. 1000 milliLiter(s) IV Continuous <Continuous>      PRN INPATIENT MEDICATION  hydrocortisone 1% Cream 1 Application(s) Topical daily PRN  ondansetron Injectable 4 milliGRAM(s) IV Push every 6 hours PRN        VITALS/PHYSICAL EXAM  --------------------------------------------------------------------------------  T(C): 37 (01-09-20 @ 10:20), Max: 37.2 (01-09-20 @ 00:56)  HR: 87 (01-09-20 @ 10:20) (87 - 94)  BP: 157/79 (01-09-20 @ 10:20) (95/56 - 157/79)  RR: 18 (01-09-20 @ 10:20) (18 - 19)  SpO2: 94% (01-09-20 @ 10:20) (94% - 99%)  Wt(kg): --        01-08-20 @ 07:01  -  01-09-20 @ 07:00  --------------------------------------------------------  IN: 5985 mL / OUT: 6122 mL / NET: -137 mL    01-09-20 @ 07:01  -  01-09-20 @ 13:02  --------------------------------------------------------  IN: 240 mL / OUT: 950 mL / NET: -710 mL      PHYSICAL EXAM  --------------------------------------------------------------------------------  	Gen: guarded but stable, A&Ox3, NC O2  	HEENT: NC/AT, PERRL, supple neck, clear oropharynx, dried ruptured blisters  	GI: (+) BS, softly distended, non tender                   midline incision c/d/i w/o drainage, pale pink blanchable improving perincisional                   (+)ostomy pink viable- thick brown liquid stool like material              MSK: FROM, no contractures nor deformities  	Vascular: Equally Warm, no clubbing, cyanosis,                        RUE edema greatly improved- almost resolved- w/o cord, tenderness, induration, erythema,                              fluctuance, drainage, nor blistering                       Rt PICC dressing c/d/I   	Neuro: No focal deficits, intact sensation, weakened strength  	Psych: Normal affect and mood  	Skin: Warm, without rashes, good turgor      LABS/ CULTURES/ RADIOLOGY:              8.0    10.79 >-----------<  286      [01-09-20 @ 09:36]              25.9     137  |  101  |  16  ----------------------------<  109      [01-09-20 @ 05:56]  4.1   |  29  |  0.44        Ca     7.8     [01-09-20 @ 05:56]      iCa    1.12     [01-09 @ 06:04]      Mg     1.8     [01-09-20 @ 05:56]      Phos  3.4     [01-09-20 @ 05:56]    TPro  5.7  /  Alb  2.3  /  TBili  0.6  /  DBili  x   /  AST  14  /  ALT  15  /  AlkPhos  79  [01-09-20 @ 05:56]    CAPILLARY BLOOD GLUCOSE  POCT Blood Glucose.: 115 mg/dL (09 Jan 2020 12:45)  POCT Blood Glucose.: 109 mg/dL (09 Jan 2020 09:04)  POCT Blood Glucose.: 113 mg/dL (08 Jan 2020 21:41)  POCT Blood Glucose.: 114 mg/dL (08 Jan 2020 18:08)  POCT Blood Glucose.: 116 mg/dL (08 Jan 2020 13:17)    Prealbumin, Serum: 13 mg/dL (01-07-20 @ 02:35)  Prealbumin, Serum: 14 mg/dL (01-06-20 @ 07:40)  Prealbumin, Serum: 16 mg/dL (01-01-20 @ 02:48)  Prealbumin, Serum: 15 mg/dL (12-31-19 @ 03:11)  Prealbumin, Serum: 16 mg/dL (12-30-19 @ 07:53)        ASSESSMENT/PLAN:  75y Male      1. Protein calorie malnutrition being optimized with TPN:  AA  [ ] gm. CHO [ ] gm. Lipids [ ] gm.  2.  Hyperglycemia managed with: [ ] units of regular insulin    3. Check fluid balance daily.  Strict I/O  [ ] [ ]   4.  Daily BMP, Ionized Calcium, Magnesium and Phosphorous   5.  Triglycerides x 3 days at initiation of TPN then monthly [ ]     TPN pager [phone], spectra [id number]  d/w Dr. Chacko and Dr. MELISSA Siegel [facility] NUTRITION SUPPORT / TPN -- FOLLOW UP NOTE  --------------------------------------------------------------------------------    24 hour events/subjective:  **Pt for OR tomorrow.   Please DO NOT stop TPN.  If TPN needs to be stopped or runs out- Please start D10 at the same rate. A new bag of TPN will be ordered for as per protocol.**     Tolerating TPN & eating dysphagia diet - eating well       Continue Octreotide, lomotil, Imodium, & tincture of opium         high ostomy out put        PPI to help decrease gastric secretions        Surgery repleting for high ostomy output prn  72hr collection for Fecal Fat & electrolytes pending  OR Friday w/ Thoracic Surgery for Lt chest effusion  SOB improved w/ NC O2  Pt denies any pain/ dyspnea/ cough/ palp  no n/v   No f/c/s      Diet:  Diet, Dysphagia 1 Pureed-Nectar Consistency Fluid:   Supplement Feeding Modality:  Oral  Ensure Pudding Cans or Servings Per Day:  2       Frequency:  Two Times a day (01-06-20 @ 09:38)  Diet, NPO after Midnight:      NPO Start Date: 09-Jan-2020,   NPO Start Time: 23:59 (01-09-20 @ 07:24)      Appetite: [  ]Poor [  ]Adequate [x  ]Good  Caloric intake:  [  x ]  Adequate   [   ] Inadequate    ROS: General/ GI see HPI  all other systems negative      ALLERGIES & MEDICATIONS  --------------------------------------------------------------------------------  ALLERGIES  IV Contrast (Hives)    STANDING INPATIENT MEDICATIONS    albuterol/ipratropium for Nebulization 3 milliLiter(s) Nebulizer every 6 hours  buDESOnide    Inhalation Suspension 0.5 milliGRAM(s) Inhalation every 12 hours  chlorhexidine 2% Cloths 1 Application(s) Topical <User Schedule>  cholestyramine Powder (Sugar-Free) 4 Gram(s) Oral two times a day  diphenoxylate/atropine 2 Tablet(s) Oral <User Schedule>  fat emulsion (Fish Oil and Plant Based) 20% Infusion 20.8 mL/Hr IV Continuous <Continuous>  insulin lispro (HumaLOG) corrective regimen sliding scale   SubCutaneous three times a day before meals  insulin lispro (HumaLOG) corrective regimen sliding scale   SubCutaneous at bedtime  loperamide 16 milliGRAM(s) Oral <User Schedule>  magnesium sulfate  IVPB 2 Gram(s) IV Intermittent once  octreotide  Injectable 100 MICROGram(s) SubCutaneous three times a day  opium Tincture 6 milliGRAM(s) Oral <User Schedule>  pantoprazole  Injectable 40 milliGRAM(s) IV Push daily  Parenteral Nutrition - Adult 1 Each TPN Continuous <Continuous>  Parenteral Nutrition - Adult 1 Each TPN Continuous <Continuous>  sodium chloride 0.9%. 1000 milliLiter(s) IV Continuous <Continuous>      PRN INPATIENT MEDICATION  hydrocortisone 1% Cream 1 Application(s) Topical daily PRN  ondansetron Injectable 4 milliGRAM(s) IV Push every 6 hours PRN        VITALS/PHYSICAL EXAM  --------------------------------------------------------------------------------  T(C): 37 (01-09-20 @ 10:20), Max: 37.2 (01-09-20 @ 00:56)  HR: 87 (01-09-20 @ 10:20) (87 - 94)  BP: 157/79 (01-09-20 @ 10:20) (95/56 - 157/79)  RR: 18 (01-09-20 @ 10:20) (18 - 19)  SpO2: 94% (01-09-20 @ 10:20) (94% - 99%)  Wt(kg): --        01-08-20 @ 07:01  -  01-09-20 @ 07:00  --------------------------------------------------------  IN: 5985 mL / OUT: 6122 mL / NET: -137 mL    01-09-20 @ 07:01  -  01-09-20 @ 13:02  --------------------------------------------------------  IN: 240 mL / OUT: 950 mL / NET: -710 mL      PHYSICAL EXAM  --------------------------------------------------------------------------------  	Gen: guarded but stable, A&Ox3, NC O2  	HEENT: NC/AT, PERRL, supple neck, clear oropharynx, dried ruptured blisters  	GI: (+) BS, softly distended, non tender                   midline incision c/d/i w/o drainage, pale pink blanchable improving perincisional                   (+)ostomy pink viable- thick brown liquid stool like material              MSK: FROM, no contractures nor deformities  	Vascular: Equally Warm, no clubbing, cyanosis,                        RUE edema resolved- w/o cord, tenderness, induration, erythema,                              fluctuance, drainage, nor blistering                       Rt PICC dressing c/d/I   	Neuro: No focal deficits, intact sensation, weakened strength  	Psych: Normal affect and mood  	Skin: Warm, without rashes, good turgor      LABS/ CULTURES/ RADIOLOGY:              8.0    10.79 >-----------<  286      [01-09-20 @ 09:36]              25.9     137  |  101  |  16  ----------------------------<  109      [01-09-20 @ 05:56]  4.1   |  29  |  0.44        Ca     7.8     [01-09-20 @ 05:56]      iCa    1.12     [01-09 @ 06:04]      Mg     1.8     [01-09-20 @ 05:56]      Phos  3.4     [01-09-20 @ 05:56]    TPro  5.7  /  Alb  2.3  /  TBili  0.6  /  DBili  x   /  AST  14  /  ALT  15  /  AlkPhos  79  [01-09-20 @ 05:56]    CAPILLARY BLOOD GLUCOSE  POCT Blood Glucose.: 115 mg/dL (09 Jan 2020 12:45)  POCT Blood Glucose.: 109 mg/dL (09 Jan 2020 09:04)  POCT Blood Glucose.: 113 mg/dL (08 Jan 2020 21:41)  POCT Blood Glucose.: 114 mg/dL (08 Jan 2020 18:08)  POCT Blood Glucose.: 116 mg/dL (08 Jan 2020 13:17)    Prealbumin, Serum: 13 mg/dL (01-07-20 @ 02:35)  Prealbumin, Serum: 14 mg/dL (01-06-20 @ 07:40)  Prealbumin, Serum: 16 mg/dL (01-01-20 @ 02:48)  Prealbumin, Serum: 15 mg/dL (12-31-19 @ 03:11)  Prealbumin, Serum: 16 mg/dL (12-30-19 @ 07:53)        ASSESSMENT/PLAN:  75y Male      1. Protein calorie malnutrition being optimized with TPN:  AA  [ ] gm. CHO [ ] gm. Lipids [ ] gm.  2.  Hyperglycemia managed with: [ ] units of regular insulin    3. Check fluid balance daily.  Strict I/O  [ ] [ ]   4.  Daily BMP, Ionized Calcium, Magnesium and Phosphorous   5.  Triglycerides x 3 days at initiation of TPN then monthly [ ]     TPN pager [phone], spectra [id number]  d/w Dr. Chacko and Dr. MELISSA Siegel [facility] NUTRITION SUPPORT / TPN -- FOLLOW UP NOTE  --------------------------------------------------------------------------------    24 hour events/subjective:  **Pt for OR tomorrow.   Please DO NOT stop TPN.  If TPN needs to be stopped or runs out- Please start D10 at the same rate. A new bag of TPN will be ordered for as per protocol.**     Tolerating TPN & eating dysphagia diet - eating well       Continue Octreotide, lomotil, Imodium, & tincture of opium         high ostomy out put        PPI to help decrease gastric secretions        Surgery repleting for high ostomy output prn  72hr collection for Fecal Fat & electrolytes pending  OR Friday w/ Thoracic Surgery for Lt chest effusion  SOB improved w/ NC O2  Pt denies any pain/ dyspnea/ cough/ palp  no n/v   No f/c/s      Diet:  Diet, Dysphagia 1 Pureed-Nectar Consistency Fluid:   Supplement Feeding Modality:  Oral  Ensure Pudding Cans or Servings Per Day:  2       Frequency:  Two Times a day (01-06-20 @ 09:38)  Diet, NPO after Midnight:      NPO Start Date: 09-Jan-2020,   NPO Start Time: 23:59 (01-09-20 @ 07:24)      Appetite: [  ]Poor [  ]Adequate [x  ]Good  Caloric intake:  [  x ]  Adequate   [   ] Inadequate    ROS: General/ GI see HPI  all other systems negative      ALLERGIES & MEDICATIONS  --------------------------------------------------------------------------------  ALLERGIES  IV Contrast (Hives)    STANDING INPATIENT MEDICATIONS    albuterol/ipratropium for Nebulization 3 milliLiter(s) Nebulizer every 6 hours  buDESOnide    Inhalation Suspension 0.5 milliGRAM(s) Inhalation every 12 hours  chlorhexidine 2% Cloths 1 Application(s) Topical <User Schedule>  cholestyramine Powder (Sugar-Free) 4 Gram(s) Oral two times a day  diphenoxylate/atropine 2 Tablet(s) Oral <User Schedule>  fat emulsion (Fish Oil and Plant Based) 20% Infusion 20.8 mL/Hr IV Continuous <Continuous>  insulin lispro (HumaLOG) corrective regimen sliding scale   SubCutaneous three times a day before meals  insulin lispro (HumaLOG) corrective regimen sliding scale   SubCutaneous at bedtime  loperamide 16 milliGRAM(s) Oral <User Schedule>  magnesium sulfate  IVPB 2 Gram(s) IV Intermittent once  octreotide  Injectable 100 MICROGram(s) SubCutaneous three times a day  opium Tincture 6 milliGRAM(s) Oral <User Schedule>  pantoprazole  Injectable 40 milliGRAM(s) IV Push daily  Parenteral Nutrition - Adult 1 Each TPN Continuous <Continuous>  Parenteral Nutrition - Adult 1 Each TPN Continuous <Continuous>  sodium chloride 0.9%. 1000 milliLiter(s) IV Continuous <Continuous>      PRN INPATIENT MEDICATION  hydrocortisone 1% Cream 1 Application(s) Topical daily PRN  ondansetron Injectable 4 milliGRAM(s) IV Push every 6 hours PRN        VITALS/PHYSICAL EXAM  --------------------------------------------------------------------------------  T(C): 37 (01-09-20 @ 10:20), Max: 37.2 (01-09-20 @ 00:56)  HR: 87 (01-09-20 @ 10:20) (87 - 94)  BP: 157/79 (01-09-20 @ 10:20) (95/56 - 157/79)  RR: 18 (01-09-20 @ 10:20) (18 - 19)  SpO2: 94% (01-09-20 @ 10:20) (94% - 99%)  Wt(kg): --        01-08-20 @ 07:01  -  01-09-20 @ 07:00  --------------------------------------------------------  IN: 5985 mL / OUT: 6122 mL / NET: -137 mL    01-09-20 @ 07:01  -  01-09-20 @ 13:02  --------------------------------------------------------  IN: 240 mL / OUT: 950 mL / NET: -710 mL      PHYSICAL EXAM  --------------------------------------------------------------------------------  	Gen: guarded but stable, A&Ox3, NC O2  	HEENT: NC/AT, PERRL, supple neck, clear oropharynx, dried ruptured blisters  	GI: (+) BS, softly distended, non tender                   midline incision c/d/i w/o drainage, pale pink blanchable improving perincisional                   (+)ostomy pink viable- thick brown liquid stool like material              MSK: FROM, no contractures nor deformities  	Vascular: Equally Warm, no clubbing, cyanosis,                        RUE edema resolved- w/o cord, tenderness, induration, erythema,                              fluctuance, drainage, nor blistering                       Rt PICC dressing c/d/I   	Neuro: No focal deficits, intact sensation, weakened strength  	Psych: Normal affect and mood  	Skin: Warm, without rashes, good turgor      LABS/ CULTURES/ RADIOLOGY:              8.0    10.79 >-----------<  286      [01-09-20 @ 09:36]              25.9     137  |  101  |  16  ----------------------------<  109      [01-09-20 @ 05:56]  4.1   |  29  |  0.44        Ca     7.8     [01-09-20 @ 05:56]      iCa    1.12     [01-09 @ 06:04]      Mg     1.8     [01-09-20 @ 05:56]      Phos  3.4     [01-09-20 @ 05:56]    TPro  5.7  /  Alb  2.3  /  TBili  0.6  /  DBili  x   /  AST  14  /  ALT  15  /  AlkPhos  79  [01-09-20 @ 05:56]    CAPILLARY BLOOD GLUCOSE  POCT Blood Glucose.: 115 mg/dL (09 Jan 2020 12:45)  POCT Blood Glucose.: 109 mg/dL (09 Jan 2020 09:04)  POCT Blood Glucose.: 113 mg/dL (08 Jan 2020 21:41)  POCT Blood Glucose.: 114 mg/dL (08 Jan 2020 18:08)  POCT Blood Glucose.: 116 mg/dL (08 Jan 2020 13:17)    Prealbumin, Serum: 13 mg/dL (01-07-20 @ 02:35)  Prealbumin, Serum: 14 mg/dL (01-06-20 @ 07:40)  Prealbumin, Serum: 16 mg/dL (01-01-20 @ 02:48)  Prealbumin, Serum: 15 mg/dL (12-31-19 @ 03:11)  Prealbumin, Serum: 16 mg/dL (12-30-19 @ 07:53)    Triglycerides, Serum: 50 mg/dL (01.07.20 @ 01:05)  Triglycerides, Serum: 48 mg/dL (01.06.20 @ 02:44)  Triglycerides, Serum: 56 mg/dL (01.01.20 @ 00:42)

## 2020-01-09 NOTE — PROGRESS NOTE ADULT - SUBJECTIVE AND OBJECTIVE BOX
Patient is a 75y old  Male who presents with a chief complaint of abd. pain (09 Jan 2020 12:21)      SUBJECTIVE: "I feel okay this morning  "    Vital Signs Last 24 Hrs  T(C): 37 (01-09-20 @ 10:20), Max: 37.2 (01-09-20 @ 00:56)  T(F): 98.6 (01-09-20 @ 10:20), Max: 98.9 (01-09-20 @ 00:56)  HR: 87 (01-09-20 @ 10:20) (87 - 94)  BP: 157/79 (01-09-20 @ 10:20) (95/56 - 157/79)  RR: 18 (01-09-20 @ 10:20) (18 - 19)  SpO2: 94% (01-09-20 @ 10:20) (94% - 99%)                01-08-20 @ 07:01  -  01-09-20 @ 07:00  --------------------------------------------------------  IN: 5985 mL / OUT: 6122 mL / NET: -137 mL    01-09-20 @ 07:01  -  01-09-20 @ 13:03  --------------------------------------------------------  IN: 240 mL / OUT: 950 mL / NET: -710 mL        Daily     Daily                           8.0    10.79 )-----------( 286      ( 09 Jan 2020 09:36 )             25.9     01-09    137  |  101  |  16  ----------------------------<  109<H>  4.1   |  29  |  0.44<L>    Ca    7.8<L>      09 Jan 2020 05:56  Phos  3.4     01-09  Mg     1.8     01-09    TPro  5.7<L>  /  Alb  2.3<L>  /  TBili  0.6  /  DBili  x   /  AST  14  /  ALT  15  /  AlkPhos  79  01-09          PHYSICAL EXAM  Neurology: A&Ox3, NAD, no gross deficits  CV : RRR+S1S2  Lungs: Respirations non-labored, B/L BS diminished at bases  Abdomen: Soft, NT/ND, +BSx4Q, ileostomy   Extremities: B/L LE edema, negative calf tenderness, +PP    R PICC       CHEST TUBES:  L pigtail chest tube - clamped , for OR tomorrw for pleurx placement  no air leak prior to clamping            MEDICATIONS  albuterol/ipratropium for Nebulization 3 milliLiter(s) Nebulizer every 6 hours  buDESOnide    Inhalation Suspension 0.5 milliGRAM(s) Inhalation every 12 hours  chlorhexidine 2% Cloths 1 Application(s) Topical <User Schedule>  cholestyramine Powder (Sugar-Free) 4 Gram(s) Oral two times a day  diphenoxylate/atropine 2 Tablet(s) Oral <User Schedule>  fat emulsion (Fish Oil and Plant Based) 20% Infusion 20.8 mL/Hr IV Continuous <Continuous>  hydrocortisone 1% Cream 1 Application(s) Topical daily PRN  insulin lispro (HumaLOG) corrective regimen sliding scale   SubCutaneous three times a day before meals  insulin lispro (HumaLOG) corrective regimen sliding scale   SubCutaneous at bedtime  loperamide 16 milliGRAM(s) Oral <User Schedule>  magnesium sulfate  IVPB 2 Gram(s) IV Intermittent once  octreotide  Injectable 100 MICROGram(s) SubCutaneous three times a day  ondansetron Injectable 4 milliGRAM(s) IV Push every 6 hours PRN  opium Tincture 6 milliGRAM(s) Oral <User Schedule>  pantoprazole  Injectable 40 milliGRAM(s) IV Push daily  Parenteral Nutrition - Adult 1 Each TPN Continuous <Continuous>  Parenteral Nutrition - Adult 1 Each TPN Continuous <Continuous>  sodium chloride 0.9%. 1000 milliLiter(s) IV Continuous <Continuous>

## 2020-01-10 ENCOUNTER — APPOINTMENT (OUTPATIENT)
Dept: THORACIC SURGERY | Facility: HOSPITAL | Age: 75
End: 2020-01-10

## 2020-01-10 LAB
ALBUMIN SERPL ELPH-MCNC: 2.3 G/DL — LOW (ref 3.3–5)
ALP SERPL-CCNC: 74 U/L — SIGNIFICANT CHANGE UP (ref 40–120)
ALT FLD-CCNC: 13 U/L — SIGNIFICANT CHANGE UP (ref 10–45)
ANION GAP SERPL CALC-SCNC: 8 MMOL/L — SIGNIFICANT CHANGE UP (ref 5–17)
APTT BLD: 52.6 SEC — HIGH (ref 27.5–36.3)
AST SERPL-CCNC: 15 U/L — SIGNIFICANT CHANGE UP (ref 10–40)
BILIRUB SERPL-MCNC: 0.6 MG/DL — SIGNIFICANT CHANGE UP (ref 0.2–1.2)
BLD GP AB SCN SERPL QL: NEGATIVE — SIGNIFICANT CHANGE UP
BUN SERPL-MCNC: 17 MG/DL — SIGNIFICANT CHANGE UP (ref 7–23)
CA-I BLD-SCNC: 1.15 MMOL/L — SIGNIFICANT CHANGE UP (ref 1.12–1.3)
CALCIUM SERPL-MCNC: 8 MG/DL — LOW (ref 8.4–10.5)
CHLORIDE SERPL-SCNC: 102 MMOL/L — SIGNIFICANT CHANGE UP (ref 96–108)
CO2 SERPL-SCNC: 31 MMOL/L — SIGNIFICANT CHANGE UP (ref 22–31)
CREAT SERPL-MCNC: 0.54 MG/DL — SIGNIFICANT CHANGE UP (ref 0.5–1.3)
FAT 72H STL-MCNT: 40 G/24 H — HIGH
FAT STL QL: 72 H — SIGNIFICANT CHANGE UP
FAT STL QN: SIGNIFICANT CHANGE UP
FAT STL QN: SIGNIFICANT CHANGE UP
GLUCOSE BLDC GLUCOMTR-MCNC: 111 MG/DL — HIGH (ref 70–99)
GLUCOSE BLDC GLUCOMTR-MCNC: 139 MG/DL — HIGH (ref 70–99)
GLUCOSE SERPL-MCNC: 102 MG/DL — HIGH (ref 70–99)
HCT VFR BLD CALC: 23.5 % — LOW (ref 39–50)
HGB BLD-MCNC: 7.2 G/DL — LOW (ref 13–17)
INR BLD: 1.18 RATIO — HIGH (ref 0.88–1.16)
MAGNESIUM SERPL-MCNC: 1.9 MG/DL — SIGNIFICANT CHANGE UP (ref 1.6–2.6)
MCHC RBC-ENTMCNC: 29.6 PG — SIGNIFICANT CHANGE UP (ref 27–34)
MCHC RBC-ENTMCNC: 30.6 GM/DL — LOW (ref 32–36)
MCV RBC AUTO: 96.7 FL — SIGNIFICANT CHANGE UP (ref 80–100)
PHOSPHATE SERPL-MCNC: 4 MG/DL — SIGNIFICANT CHANGE UP (ref 2.5–4.5)
PLATELET # BLD AUTO: 262 K/UL — SIGNIFICANT CHANGE UP (ref 150–400)
POTASSIUM SERPL-MCNC: 3.9 MMOL/L — SIGNIFICANT CHANGE UP (ref 3.5–5.3)
POTASSIUM SERPL-SCNC: 3.9 MMOL/L — SIGNIFICANT CHANGE UP (ref 3.5–5.3)
PROT SERPL-MCNC: 5.6 G/DL — LOW (ref 6–8.3)
PROTHROM AB SERPL-ACNC: 13.4 SEC — HIGH (ref 10–13.1)
RBC # BLD: 2.43 M/UL — LOW (ref 4.2–5.8)
RBC # FLD: 15.1 % — HIGH (ref 10.3–14.5)
RH IG SCN BLD-IMP: POSITIVE — SIGNIFICANT CHANGE UP
SODIUM SERPL-SCNC: 141 MMOL/L — SIGNIFICANT CHANGE UP (ref 135–145)
SPECIMEN WT STL QN: 7775 G — SIGNIFICANT CHANGE UP
WBC # BLD: 10.76 K/UL — HIGH (ref 3.8–10.5)
WBC # FLD AUTO: 10.76 K/UL — HIGH (ref 3.8–10.5)

## 2020-01-10 PROCEDURE — 32550 INSERT PLEURAL CATH: CPT

## 2020-01-10 PROCEDURE — 99232 SBSQ HOSP IP/OBS MODERATE 35: CPT

## 2020-01-10 PROCEDURE — 99221 1ST HOSP IP/OBS SF/LOW 40: CPT | Mod: GC

## 2020-01-10 PROCEDURE — 71045 X-RAY EXAM CHEST 1 VIEW: CPT | Mod: 26

## 2020-01-10 PROCEDURE — 71045 X-RAY EXAM CHEST 1 VIEW: CPT | Mod: 26,77

## 2020-01-10 PROCEDURE — 32601 THORACOSCOPY DIAGNOSTIC: CPT

## 2020-01-10 RX ORDER — LOPERAMIDE HCL 2 MG
16 TABLET ORAL
Refills: 0 | Status: DISCONTINUED | OUTPATIENT
Start: 2020-01-10 | End: 2020-02-04

## 2020-01-10 RX ORDER — ONDANSETRON 8 MG/1
4 TABLET, FILM COATED ORAL ONCE
Refills: 0 | Status: DISCONTINUED | OUTPATIENT
Start: 2020-01-10 | End: 2020-01-10

## 2020-01-10 RX ORDER — ACETAMINOPHEN 500 MG
975 TABLET ORAL EVERY 6 HOURS
Refills: 0 | Status: DISCONTINUED | OUTPATIENT
Start: 2020-01-10 | End: 2020-01-21

## 2020-01-10 RX ORDER — PANTOPRAZOLE SODIUM 20 MG/1
40 TABLET, DELAYED RELEASE ORAL DAILY
Refills: 0 | Status: DISCONTINUED | OUTPATIENT
Start: 2020-01-10 | End: 2020-01-11

## 2020-01-10 RX ORDER — IPRATROPIUM/ALBUTEROL SULFATE 18-103MCG
3 AEROSOL WITH ADAPTER (GRAM) INHALATION EVERY 6 HOURS
Refills: 0 | Status: DISCONTINUED | OUTPATIENT
Start: 2020-01-10 | End: 2020-02-27

## 2020-01-10 RX ORDER — INSULIN LISPRO 100/ML
VIAL (ML) SUBCUTANEOUS
Refills: 0 | Status: DISCONTINUED | OUTPATIENT
Start: 2020-01-10 | End: 2020-01-15

## 2020-01-10 RX ORDER — HYDROCORTISONE 1 %
1 OINTMENT (GRAM) TOPICAL DAILY
Refills: 0 | Status: DISCONTINUED | OUTPATIENT
Start: 2020-01-10 | End: 2020-01-11

## 2020-01-10 RX ORDER — INSULIN LISPRO 100/ML
VIAL (ML) SUBCUTANEOUS EVERY 6 HOURS
Refills: 0 | Status: DISCONTINUED | OUTPATIENT
Start: 2020-01-10 | End: 2020-01-10

## 2020-01-10 RX ORDER — BUDESONIDE, MICRONIZED 100 %
0.5 POWDER (GRAM) MISCELLANEOUS EVERY 12 HOURS
Refills: 0 | Status: DISCONTINUED | OUTPATIENT
Start: 2020-01-10 | End: 2020-02-14

## 2020-01-10 RX ORDER — OCTREOTIDE ACETATE 200 UG/ML
100 INJECTION, SOLUTION INTRAVENOUS; SUBCUTANEOUS THREE TIMES A DAY
Refills: 0 | Status: DISCONTINUED | OUTPATIENT
Start: 2020-01-10 | End: 2020-02-05

## 2020-01-10 RX ORDER — MORPHINE 10 MG/ML
6 SOLUTION ORAL
Refills: 0 | Status: DISCONTINUED | OUTPATIENT
Start: 2020-01-10 | End: 2020-01-14

## 2020-01-10 RX ORDER — ONDANSETRON 8 MG/1
4 TABLET, FILM COATED ORAL EVERY 6 HOURS
Refills: 0 | Status: DISCONTINUED | OUTPATIENT
Start: 2020-01-10 | End: 2020-02-05

## 2020-01-10 RX ORDER — INSULIN LISPRO 100/ML
VIAL (ML) SUBCUTANEOUS AT BEDTIME
Refills: 0 | Status: DISCONTINUED | OUTPATIENT
Start: 2020-01-10 | End: 2020-01-15

## 2020-01-10 RX ORDER — SODIUM CHLORIDE 9 MG/ML
1000 INJECTION INTRAMUSCULAR; INTRAVENOUS; SUBCUTANEOUS
Refills: 0 | Status: DISCONTINUED | OUTPATIENT
Start: 2020-01-10 | End: 2020-01-18

## 2020-01-10 RX ORDER — CHOLESTYRAMINE 4 G/9G
4 POWDER, FOR SUSPENSION ORAL
Refills: 0 | Status: DISCONTINUED | OUTPATIENT
Start: 2020-01-10 | End: 2020-01-15

## 2020-01-10 RX ORDER — HYDROCORTISONE 1 %
1 OINTMENT (GRAM) TOPICAL
Refills: 0 | Status: DISCONTINUED | OUTPATIENT
Start: 2020-01-10 | End: 2020-01-10

## 2020-01-10 RX ORDER — CHLORHEXIDINE GLUCONATE 213 G/1000ML
1 SOLUTION TOPICAL
Refills: 0 | Status: DISCONTINUED | OUTPATIENT
Start: 2020-01-10 | End: 2020-02-26

## 2020-01-10 RX ORDER — DIPHENOXYLATE HCL/ATROPINE 2.5-.025MG
2 TABLET ORAL
Refills: 0 | Status: DISCONTINUED | OUTPATIENT
Start: 2020-01-10 | End: 2020-01-14

## 2020-01-10 RX ORDER — ENOXAPARIN SODIUM 100 MG/ML
40 INJECTION SUBCUTANEOUS DAILY
Refills: 0 | Status: DISCONTINUED | OUTPATIENT
Start: 2020-01-10 | End: 2020-02-08

## 2020-01-10 RX ORDER — FENTANYL CITRATE 50 UG/ML
25 INJECTION INTRAVENOUS
Refills: 0 | Status: DISCONTINUED | OUTPATIENT
Start: 2020-01-10 | End: 2020-01-10

## 2020-01-10 RX ORDER — ELECTROLYTE SOLUTION,INJ
1 VIAL (ML) INTRAVENOUS
Refills: 0 | Status: DISCONTINUED | OUTPATIENT
Start: 2020-01-10 | End: 2020-01-10

## 2020-01-10 RX ORDER — SODIUM CHLORIDE 9 MG/ML
1000 INJECTION INTRAMUSCULAR; INTRAVENOUS; SUBCUTANEOUS ONCE
Refills: 0 | Status: COMPLETED | OUTPATIENT
Start: 2020-01-10 | End: 2020-01-10

## 2020-01-10 RX ORDER — I.V. FAT EMULSION 20 G/100ML
20.8 EMULSION INTRAVENOUS
Qty: 50 | Refills: 0 | Status: DISCONTINUED | OUTPATIENT
Start: 2020-01-10 | End: 2020-01-11

## 2020-01-10 RX ADMIN — MORPHINE 6 MILLIGRAM(S): 10 SOLUTION ORAL at 17:17

## 2020-01-10 RX ADMIN — Medication 975 MILLIGRAM(S): at 15:53

## 2020-01-10 RX ADMIN — Medication 2 TABLET(S): at 17:17

## 2020-01-10 RX ADMIN — Medication 2 TABLET(S): at 23:26

## 2020-01-10 RX ADMIN — I.V. FAT EMULSION 20.8 ML/HR: 20 EMULSION INTRAVENOUS at 17:21

## 2020-01-10 RX ADMIN — Medication 16 MILLIGRAM(S): at 08:03

## 2020-01-10 RX ADMIN — Medication 0.5 MILLIGRAM(S): at 17:20

## 2020-01-10 RX ADMIN — Medication 1 APPLICATION(S): at 17:34

## 2020-01-10 RX ADMIN — OCTREOTIDE ACETATE 100 MICROGRAM(S): 200 INJECTION, SOLUTION INTRAVENOUS; SUBCUTANEOUS at 06:28

## 2020-01-10 RX ADMIN — ENOXAPARIN SODIUM 40 MILLIGRAM(S): 100 INJECTION SUBCUTANEOUS at 15:34

## 2020-01-10 RX ADMIN — Medication 975 MILLIGRAM(S): at 23:27

## 2020-01-10 RX ADMIN — Medication 975 MILLIGRAM(S): at 23:57

## 2020-01-10 RX ADMIN — Medication 16 MILLIGRAM(S): at 23:26

## 2020-01-10 RX ADMIN — Medication 3 MILLILITER(S): at 17:11

## 2020-01-10 RX ADMIN — OCTREOTIDE ACETATE 100 MICROGRAM(S): 200 INJECTION, SOLUTION INTRAVENOUS; SUBCUTANEOUS at 21:53

## 2020-01-10 RX ADMIN — CHOLESTYRAMINE 4 GRAM(S): 4 POWDER, FOR SUSPENSION ORAL at 21:53

## 2020-01-10 RX ADMIN — Medication 1 EACH: at 17:20

## 2020-01-10 RX ADMIN — Medication 3 MILLILITER(S): at 23:27

## 2020-01-10 RX ADMIN — CHLORHEXIDINE GLUCONATE 1 APPLICATION(S): 213 SOLUTION TOPICAL at 15:30

## 2020-01-10 RX ADMIN — Medication 0.5 MILLIGRAM(S): at 06:28

## 2020-01-10 RX ADMIN — Medication 975 MILLIGRAM(S): at 16:15

## 2020-01-10 RX ADMIN — Medication 16 MILLIGRAM(S): at 17:12

## 2020-01-10 RX ADMIN — MORPHINE 6 MILLIGRAM(S): 10 SOLUTION ORAL at 23:26

## 2020-01-10 RX ADMIN — Medication 2 TABLET(S): at 08:02

## 2020-01-10 RX ADMIN — PANTOPRAZOLE SODIUM 40 MILLIGRAM(S): 20 TABLET, DELAYED RELEASE ORAL at 15:31

## 2020-01-10 RX ADMIN — Medication 3 MILLILITER(S): at 06:28

## 2020-01-10 RX ADMIN — SODIUM CHLORIDE 10 MILLILITER(S): 9 INJECTION INTRAMUSCULAR; INTRAVENOUS; SUBCUTANEOUS at 17:11

## 2020-01-10 RX ADMIN — SODIUM CHLORIDE 1000 MILLILITER(S): 9 INJECTION INTRAMUSCULAR; INTRAVENOUS; SUBCUTANEOUS at 06:27

## 2020-01-10 NOTE — PROGRESS NOTE ADULT - SUBJECTIVE AND OBJECTIVE BOX
Subjective: Patient seen and examined. No new events except as noted.   s/p VATS, with chest tube insertion for drainage of pleural effusion     REVIEW OF SYSTEMS:    CONSTITUTIONAL:+ weakness, fevers or chills  EYES/ENT: No visual changes;  No vertigo or throat pain   NECK: No pain or stiffness  RESPIRATORY: No cough, wheezing, hemoptysis; No shortness of breath  CARDIOVASCULAR: No chest pain or palpitations  GASTROINTESTINAL: No abdominal or epigastric pain. No nausea, vomiting, or hematemesis; No diarrhea or constipation. No melena or hematochezia.  GENITOURINARY: No dysuria, frequency or hematuria  NEUROLOGICAL: No numbness or weakness  SKIN: No itching, burning, rashes, or lesions   All other review of systems is negative unless indicated above.    MEDICATIONS:  MEDICATIONS  (STANDING):  acetaminophen   Tablet .. 975 milliGRAM(s) Oral every 6 hours  albuterol/ipratropium for Nebulization. 3 milliLiter(s) Nebulizer every 6 hours  buDESOnide    Inhalation Suspension 0.5 milliGRAM(s) Inhalation every 12 hours  chlorhexidine 2% Cloths 1 Application(s) Topical <User Schedule>  cholestyramine Powder (Sugar-Free) 4 Gram(s) Oral two times a day  diphenoxylate/atropine 2 Tablet(s) Oral <User Schedule>  enoxaparin Injectable 40 milliGRAM(s) SubCutaneous daily  fat emulsion (Fish Oil and Plant Based) 20% Infusion 20.8 mL/Hr (20.8 mL/Hr) IV Continuous <Continuous>  hydrocortisone 1% Ointment 1 Application(s) Topical daily  insulin lispro (HumaLOG) corrective regimen sliding scale   SubCutaneous three times a day before meals  insulin lispro (HumaLOG) corrective regimen sliding scale   SubCutaneous at bedtime  loperamide 16 milliGRAM(s) Oral <User Schedule>  octreotide  Injectable 100 MICROGram(s) SubCutaneous three times a day  opium Tincture 6 milliGRAM(s) Oral <User Schedule>  pantoprazole  Injectable 40 milliGRAM(s) IV Push daily  Parenteral Nutrition - Adult 1 Each (75 mL/Hr) TPN Continuous <Continuous>  Parenteral Nutrition - Adult 1 Each (75 mL/Hr) TPN Continuous <Continuous>  sodium chloride 0.9%. 1000 milliLiter(s) (10 mL/Hr) IV Continuous <Continuous>      PHYSICAL EXAM:  T(C): 36.7 (01-10-20 @ 13:00), Max: 37.3 (01-09-20 @ 20:58)  HR: 72 (01-10-20 @ 13:00) (72 - 90)  BP: 128/59 (01-10-20 @ 13:00) (109/58 - 134/76)  RR: 15 (01-10-20 @ 13:00) (13 - 18)  SpO2: 99% (01-10-20 @ 13:00) (95% - 100%)  Wt(kg): --  I&O's Summary    09 Jan 2020 07:01  -  10 Isma 2020 07:00  --------------------------------------------------------  IN: 5015 mL / OUT: 5175 mL / NET: -160 mL    10 Isma 2020 07:01  -  10 Isma 2020 13:29  --------------------------------------------------------  IN: 150 mL / OUT: 850 mL / NET: -700 mL            Appearance: NAD   HEENT:   Dry oral mucosa, crusted lips   Lymphatic: No lymphadenopathy   Cardiovascular: Normal S1 S2, No JVD, No murmurs , Peripheral pulses palpable 2+ bilaterally  Respiratory: Decreased bs   Gastrointestinal:  Soft, NT, ND. Ostomy pink with output. Midline staples in place, midline incision site is c/d/i   Skin: No rashes, No ecchymoses, No cyanosis, warm to touch  Musculoskeletal: Decreased  range of motion and strength  Psychiatry:  mildly sedated   Ext: No edema +PICC line   +rosas         LABS:    CARDIAC MARKERS:                                7.2    10.76 )-----------( 262      ( 10 Isma 2020 09:53 )             23.5     01-10    141  |  102  |  17  ----------------------------<  102<H>  3.9   |  31  |  0.54    Ca    8.0<L>      10 Isma 2020 07:14  Phos  4.0     01-10  Mg     1.9     01-10    TPro  5.6<L>  /  Alb  2.3<L>  /  TBili  0.6  /  DBili  x   /  AST  15  /  ALT  13  /  AlkPhos  74  01-10    proBNP:   Lipid Profile:   HgA1c:   TSH:             TELEMETRY: 	    ECG:  	  RADIOLOGY: < from: Xray Chest 1 View- PORTABLE-Routine (01.10.20 @ 08:47) >    EXAM:  XR CHEST PORTABLE ROUTINE 1V                            PROCEDURE DATE:  01/10/2020            INTERPRETATION:  XR CHEST    TECHNIQUE: One view of the chest, AP view, was obtained.    INDICATION: History of pleural effusion.      COMPARISON:Radiograph 1/9/2020.      FINDINGS:      Lines and Tubes: Right PICC in the SVC. Left pigtail catheter at the left lung base.      Lungs: Patchy left lower lung atelectasis. Patchy right lower lung opacities likely due to atelectasis.      Pleura: Nopneumothorax. Right pleural effusion is unchanged.      Heart and Mediastinum: The heart is normal in size.      Skeletal: Unremarkable.        IMPRESSION:    1.  Right pleural effusion is unchanged.  2.  No pneumothorax.                      DUNG BARNES M.D., ATTENDING RADIOLOGIST  This document has been electronically signed. Isma 10 2020 11:24AM                < end of copied text >    DIAGNOSTIC TESTING:  [ ] Echocardiogram:  [ ]  Catheterization:  [ ] Stress Test:    OTHER:

## 2020-01-10 NOTE — PROGRESS NOTE ADULT - SUBJECTIVE AND OBJECTIVE BOX
[facility] NUTRITION SUPPORT / TPN -- FOLLOW UP NOTE  --------------------------------------------------------------------------------    24 hour events/subjective:      Pt seen prior to surgery & post op-     RN & surgical team aware that pt is on TPN & it was continued in the OR  Pt was made NPO for surgery- pt just came out of OR & diet has been resumed  Pt has been tolerating TPN & eating dysphagia diet without issues       Continue Octreotide, lomotil, Imodium, & tincture of opium        high ostomy output       PPI to help decrease gastric secretions       Surgery repleting for high ostomy output prn  72hr collection for Fecal Fat & electrolytes still pending  SOB improved w/ NC O2  mentally feeling better - feeling like he's making some progress  Pt denies any pain/ dyspnea/ cough/ palp  no n/v   No f/c/s    Diet:  Diet, Dysphagia 1 Pureed-Nectar Consistency Fluid:   Supplement Feeding Modality:  Oral  Ensure Pudding Cans or Servings Per Day:  2       Frequency:  Two Times a day (01-10-20 @ 12:30)      Appetite: [ x ]Poor [  ]Adequate [  ]Good  Caloric intake:  [ x  ]  Adequate   [   ] Inadequate    ROS: General/ GI see HPI  all other systems negative      ALLERGIES & MEDICATIONS  --------------------------------------------------------------------------------  ALLERGIES  IV Contrast (Hives)    STANDING INPATIENT MEDICATIONS    buDESOnide    Inhalation Suspension 0.5 milliGRAM(s) Inhalation every 12 hours  chlorhexidine 2% Cloths 1 Application(s) Topical <User Schedule>  cholestyramine Powder (Sugar-Free) 4 Gram(s) Oral two times a day  diphenoxylate/atropine 2 Tablet(s) Oral <User Schedule>  fat emulsion (Fish Oil and Plant Based) 20% Infusion 20.8 mL/Hr IV Continuous <Continuous>  insulin lispro (HumaLOG) corrective regimen sliding scale   SubCutaneous every 6 hours  loperamide 16 milliGRAM(s) Oral <User Schedule>  octreotide  Injectable 100 MICROGram(s) SubCutaneous three times a day  opium Tincture 6 milliGRAM(s) Oral <User Schedule>  pantoprazole  Injectable 40 milliGRAM(s) IV Push daily  Parenteral Nutrition - Adult 1 Each TPN Continuous <Continuous>  Parenteral Nutrition - Adult 1 Each TPN Continuous <Continuous>  sodium chloride 0.9%. 1000 milliLiter(s) IV Continuous <Continuous>      PRN INPATIENT MEDICATION  fentaNYL    Injectable 25 MICROGram(s) IV Push every 10 minutes PRN  ondansetron Injectable 4 milliGRAM(s) IV Push once PRN  ondansetron Injectable 4 milliGRAM(s) IV Push every 6 hours PRN        VITALS/PHYSICAL EXAM  --------------------------------------------------------------------------------  T(C): 37.3 (01-10-20 @ 10:03), Max: 37.3 (01-09-20 @ 20:58)  HR: 87 (01-10-20 @ 10:03) (83 - 90)  BP: 112/62 (01-10-20 @ 10:03) (112/62 - 154/76)  RR: 18 (01-10-20 @ 10:03) (18 - 18)  SpO2: 96% (01-10-20 @ 10:03) (95% - 98%)  Wt(kg): --        01-09-20 @ 07:01  -  01-10-20 @ 07:00  --------------------------------------------------------  IN: 5015 mL / OUT: 5175 mL / NET: -160 mL    01-10-20 @ 07:01  -  01-10-20 @ 12:35  --------------------------------------------------------  IN: 0 mL / OUT: 550 mL / NET: -550 mL    PHYSICAL EXAM  --------------------------------------------------------------------------------  	Gen: guarded but stable, A&Ox3, NC O2  	HEENT: NC/AT, PERRL, supple neck, clear oropharynx, dried ruptured blisters              Chest: decreased basilar BS, Lt chest  	GI: (+) BS, softly distended, non tender                   midline incision c/d/i w/o drainage, pale pink blanchable improving perincisional                   (+)ostomy pink viable- thick brown liquid stool like material              MSK: FROM, no contractures nor deformities  	Vascular: Equally Warm, no clubbing, cyanosis, nor edema                       Rt PICC dressing c/d/I   	Neuro: No focal deficits, intact sensation, weakened strength  	Psych: Normal affect and mood  	Skin: Warm, without rashes, good turgor        LABS/ CULTURES/ RADIOLOGY:              7.2    10.76 >-----------<  262      [01-10-20 @ 09:53]              23.5     141  |  102  |  17  ----------------------------<  102      [01-10-20 @ 07:14]  3.9   |  31  |  0.54        Ca     8.0     [01-10-20 @ 07:14]      iCa    1.15     [01-10 @ 07:32]      Mg     1.9     [01-10-20 @ 07:14]      Phos  4.0     [01-10-20 @ 07:14]    TPro  5.6  /  Alb  2.3  /  TBili  0.6  /  DBili  x   /  AST  15  /  ALT  13  /  AlkPhos  74  [01-10-20 @ 07:14]    PT/INR: PT 13.4 , INR 1.18       [01-10-20 @ 08:20]  PTT: 52.6       [01-10-20 @ 08:20]      CAPILLARY BLOOD GLUCOSE  POCT Blood Glucose.: 134 mg/dL (09 Jan 2020 22:27)  POCT Blood Glucose.: 133 mg/dL (09 Jan 2020 18:13)  POCT Blood Glucose.: 115 mg/dL (09 Jan 2020 12:45)      Prealbumin, Serum: 13 mg/dL (01-07-20 @ 02:35)  Prealbumin, Serum: 14 mg/dL (01-06-20 @ 07:40)  Prealbumin, Serum: 16 mg/dL (01-01-20 @ 02:48)  Prealbumin, Serum: 15 mg/dL (12-31-19 @ 03:11)  Prealbumin, Serum: 16 mg/dL (12-30-19 @ 07:53)    Triglycerides, Serum: 50 mg/dL (01.07.20 @ 01:05)  Triglycerides, Serum: 48 mg/dL (01.06.20 @ 02:44)  Triglycerides, Serum: 56 mg/dL (01.01.20 @ 00:42) [facility] NUTRITION SUPPORT / TPN -- FOLLOW UP NOTE  --------------------------------------------------------------------------------    24 hour events/subjective:      Pt seen prior to surgery & post op-     RN & surgical team aware that pt is on TPN & it was continued in the OR  Pt was made NPO for surgery- pt just came out of OR & diet has been resumed  Pt has been tolerating TPN & eating dysphagia diet without issues       Continue Octreotide, lomotil, Imodium, & tincture of opium        high ostomy output       PPI to help decrease gastric secretions       Surgery repleting for high ostomy output prn  72hr collection for Fecal Fat & electrolytes still pending  SOB improved w/ NC O2  mentally feeling better - feeling like he's making some progress  Pt denies any pain/ dyspnea/ cough/ palp  no n/v   No f/c/s    Diet:  Diet, Dysphagia 1 Pureed-Nectar Consistency Fluid:   Supplement Feeding Modality:  Oral  Ensure Pudding Cans or Servings Per Day:  2       Frequency:  Two Times a day (01-10-20 @ 12:30)      Appetite: [ x ]Poor [  ]Adequate [  ]Good  Caloric intake:  [ x  ]  Adequate   [   ] Inadequate    ROS: General/ GI see HPI  all other systems negative      ALLERGIES & MEDICATIONS  --------------------------------------------------------------------------------  ALLERGIES  IV Contrast (Hives)    STANDING INPATIENT MEDICATIONS    buDESOnide    Inhalation Suspension 0.5 milliGRAM(s) Inhalation every 12 hours  chlorhexidine 2% Cloths 1 Application(s) Topical <User Schedule>  cholestyramine Powder (Sugar-Free) 4 Gram(s) Oral two times a day  diphenoxylate/atropine 2 Tablet(s) Oral <User Schedule>  fat emulsion (Fish Oil and Plant Based) 20% Infusion 20.8 mL/Hr IV Continuous <Continuous>  insulin lispro (HumaLOG) corrective regimen sliding scale   SubCutaneous every 6 hours  loperamide 16 milliGRAM(s) Oral <User Schedule>  octreotide  Injectable 100 MICROGram(s) SubCutaneous three times a day  opium Tincture 6 milliGRAM(s) Oral <User Schedule>  pantoprazole  Injectable 40 milliGRAM(s) IV Push daily  Parenteral Nutrition - Adult 1 Each TPN Continuous <Continuous>  Parenteral Nutrition - Adult 1 Each TPN Continuous <Continuous>  sodium chloride 0.9%. 1000 milliLiter(s) IV Continuous <Continuous>      PRN INPATIENT MEDICATION  fentaNYL    Injectable 25 MICROGram(s) IV Push every 10 minutes PRN  ondansetron Injectable 4 milliGRAM(s) IV Push once PRN  ondansetron Injectable 4 milliGRAM(s) IV Push every 6 hours PRN        VITALS/PHYSICAL EXAM  --------------------------------------------------------------------------------  T(C): 37.3 (01-10-20 @ 10:03), Max: 37.3 (01-09-20 @ 20:58)  HR: 87 (01-10-20 @ 10:03) (83 - 90)  BP: 112/62 (01-10-20 @ 10:03) (112/62 - 154/76)  RR: 18 (01-10-20 @ 10:03) (18 - 18)  SpO2: 96% (01-10-20 @ 10:03) (95% - 98%)  Wt(kg): --        01-09-20 @ 07:01  -  01-10-20 @ 07:00  --------------------------------------------------------  IN: 5015 mL / OUT: 5175 mL / NET: -160 mL    01-10-20 @ 07:01  -  01-10-20 @ 12:35  --------------------------------------------------------  IN: 0 mL / OUT: 550 mL / NET: -550 mL    PHYSICAL EXAM  --------------------------------------------------------------------------------  	Gen: guarded but stable, A&Ox3, NC O2  	HEENT: NC/AT, PERRL, supple neck, clear oropharynx, dried ruptured blisters              Chest: decreased basilar BS, Lt chest pleureX Cath to suction, dressing c/d/i  	GI: (+) BS, softly distended, non tender                   midline incision c/d/i w/o drainage, pale pink blanchable improving perincisional                   (+)ostomy pink viable- thick brown liquid stool like material              MSK: FROM, no contractures nor deformities  	Vascular: Equally Warm, no clubbing, cyanosis, nor edema                       Rt PICC dressing c/d/I   	Neuro: No focal deficits, intact sensation, weakened strength  	Psych: Normal affect and mood  	Skin: Warm, without rashes, good turgor        LABS/ CULTURES/ RADIOLOGY:              7.2    10.76 >-----------<  262      [01-10-20 @ 09:53]              23.5     141  |  102  |  17  ----------------------------<  102      [01-10-20 @ 07:14]  3.9   |  31  |  0.54        Ca     8.0     [01-10-20 @ 07:14]      iCa    1.15     [01-10 @ 07:32]      Mg     1.9     [01-10-20 @ 07:14]      Phos  4.0     [01-10-20 @ 07:14]    TPro  5.6  /  Alb  2.3  /  TBili  0.6  /  DBili  x   /  AST  15  /  ALT  13  /  AlkPhos  74  [01-10-20 @ 07:14]    PT/INR: PT 13.4 , INR 1.18       [01-10-20 @ 08:20]  PTT: 52.6       [01-10-20 @ 08:20]      CAPILLARY BLOOD GLUCOSE  POCT Blood Glucose.: 134 mg/dL (09 Jan 2020 22:27)  POCT Blood Glucose.: 133 mg/dL (09 Jan 2020 18:13)  POCT Blood Glucose.: 115 mg/dL (09 Jan 2020 12:45)      Prealbumin, Serum: 13 mg/dL (01-07-20 @ 02:35)  Prealbumin, Serum: 14 mg/dL (01-06-20 @ 07:40)  Prealbumin, Serum: 16 mg/dL (01-01-20 @ 02:48)  Prealbumin, Serum: 15 mg/dL (12-31-19 @ 03:11)  Prealbumin, Serum: 16 mg/dL (12-30-19 @ 07:53)    Triglycerides, Serum: 50 mg/dL (01.07.20 @ 01:05)  Triglycerides, Serum: 48 mg/dL (01.06.20 @ 02:44)  Triglycerides, Serum: 56 mg/dL (01.01.20 @ 00:42)

## 2020-01-10 NOTE — CONSULT NOTE ADULT - SUBJECTIVE AND OBJECTIVE BOX
HPI:  Mr. Wicho Fuller is a 74 year old man PMH COPD, EtOH abuse who is admitted with SBO s/p SBR and ileostomy c/b pneumothorax and pulmonary effusion s/p pleurx placement. Dermatology is consulted for rash on back that was noticed 3 days ago. Occasionally itchy. Tried HC 1% without improvement.       PAST MEDICAL & SURGICAL HISTORY:  COPD with hypoxia  ETOHism  ETOH abuse  History of lumbosacral spine surgery  History of prostate surgery  History of appendectomy      Review of Systems:  Constitutional: denies fevers, chills  HEENT: odynophagia or dysphagia  Cardiovascular: denies palpitations  Respiratory: denies SOB, wheezing  Gastrointestinal: denies N/V/D, abdominal pain  : denies dysuria  MSK: denies weakness, joint pain  Skin: denies new rashes or masses unless otherwise specified in hpi    MEDICATIONS  (STANDING):  acetaminophen   Tablet .. 975 milliGRAM(s) Oral every 6 hours  albuterol/ipratropium for Nebulization. 3 milliLiter(s) Nebulizer every 6 hours  buDESOnide    Inhalation Suspension 0.5 milliGRAM(s) Inhalation every 12 hours  chlorhexidine 2% Cloths 1 Application(s) Topical <User Schedule>  cholestyramine Powder (Sugar-Free) 4 Gram(s) Oral two times a day  diphenoxylate/atropine 2 Tablet(s) Oral <User Schedule>  enoxaparin Injectable 40 milliGRAM(s) SubCutaneous daily  fat emulsion (Fish Oil and Plant Based) 20% Infusion 20.8 mL/Hr (20.8 mL/Hr) IV Continuous <Continuous>  hydrocortisone 1% Ointment 1 Application(s) Topical daily  insulin lispro (HumaLOG) corrective regimen sliding scale   SubCutaneous three times a day before meals  insulin lispro (HumaLOG) corrective regimen sliding scale   SubCutaneous at bedtime  loperamide 16 milliGRAM(s) Oral <User Schedule>  octreotide  Injectable 100 MICROGram(s) SubCutaneous three times a day  opium Tincture 6 milliGRAM(s) Oral <User Schedule>  pantoprazole  Injectable 40 milliGRAM(s) IV Push daily  Parenteral Nutrition - Adult 1 Each (75 mL/Hr) TPN Continuous <Continuous>  Parenteral Nutrition - Adult 1 Each (75 mL/Hr) TPN Continuous <Continuous>  sodium chloride 0.9%. 1000 milliLiter(s) (10 mL/Hr) IV Continuous <Continuous>    MEDICATIONS  (PRN):  fentaNYL    Injectable 25 MICROGram(s) IV Push every 10 minutes PRN Moderate Pain (4 - 6)  ondansetron Injectable 4 milliGRAM(s) IV Push once PRN Nausea and/or Vomiting  ondansetron Injectable 4 milliGRAM(s) IV Push every 6 hours PRN Nausea and/or Vomiting      Allergies    IV Contrast (Hives)    Intolerances        SOCIAL HISTORY: hx of EtOH abuse    FAMILY HISTORY: non-contributory      Vital Signs Last 24 Hrs  T(C): 36.7 (10 Isma 2020 13:00), Max: 37.3 (09 Jan 2020 20:58)  T(F): 98.1 (10 Isma 2020 13:00), Max: 99.2 (10 Isma 2020 09:03)  HR: 68 (10 Isma 2020 14:30) (68 - 90)  BP: 137/67 (10 Isma 2020 14:30) (109/58 - 137/67)  BP(mean): 93 (10 Isma 2020 14:30) (80 - 93)  RR: 16 (10 Isma 2020 14:30) (13 - 18)  SpO2: 100% (10 Isma 2020 14:30) (95% - 100%)    Physical Exam:  The patient was alert and oriented X 3, well nourished, and in no apparent distress. Oropharynx showed no ulcerations. There was no visible lymphadenopathy. Conjunctiva were non-injected. There was no clubbing or edema of extremities.    The scalp, hair, face, eyebrows, lips, oropharynx , neck, chest, back, buttocks, extremities X 4, hands, feet, nails were examined. There was no hyperhidrosis or bromhidrosis.     The following lesions are noted:   erythema   LABS:                        7.2    10.76 )-----------( 262      ( 10 Isma 2020 09:53 )             23.5     01-10    141  |  102  |  17  ----------------------------<  102<H>  3.9   |  31  |  0.54    Ca    8.0<L>      10 Isma 2020 07:14  Phos  4.0     01-10  Mg     1.9     01-10    TPro  5.6<L>  /  Alb  2.3<L>  /  TBili  0.6  /  DBili  x   /  AST  15  /  ALT  13  /  AlkPhos  74  01-10    PT/INR - ( 10 Isma 2020 08:20 )   PT: 13.4 sec;   INR: 1.18 ratio         PTT - ( 10 Isma 2020 08:20 )  PTT:52.6 sec      RADIOLOGY & ADDITIONAL STUDIES: no relevant studies HPI:  Mr. Wicho Fuller is a 74 year old man PMH COPD, EtOH abuse who is admitted with SBO s/p SBR and ileostomy c/b pneumothorax and pulmonary effusion s/p pleurx placement. Dermatology is consulted for rash on back that was noticed 3 days ago. Occasionally itchy. Tried HC 1% without improvement.       PAST MEDICAL & SURGICAL HISTORY:  COPD with hypoxia  ETOHism  ETOH abuse  History of lumbosacral spine surgery  History of prostate surgery  History of appendectomy      Review of Systems:  Constitutional: denies fevers, chills  HEENT: odynophagia or dysphagia  Cardiovascular: denies palpitations  Respiratory: denies SOB, wheezing  Gastrointestinal: denies N/V/D, abdominal pain  : denies dysuria  MSK: denies weakness, joint pain  Skin: denies new rashes or masses unless otherwise specified in hpi    MEDICATIONS  (STANDING):  acetaminophen   Tablet .. 975 milliGRAM(s) Oral every 6 hours  albuterol/ipratropium for Nebulization. 3 milliLiter(s) Nebulizer every 6 hours  buDESOnide    Inhalation Suspension 0.5 milliGRAM(s) Inhalation every 12 hours  chlorhexidine 2% Cloths 1 Application(s) Topical <User Schedule>  cholestyramine Powder (Sugar-Free) 4 Gram(s) Oral two times a day  diphenoxylate/atropine 2 Tablet(s) Oral <User Schedule>  enoxaparin Injectable 40 milliGRAM(s) SubCutaneous daily  fat emulsion (Fish Oil and Plant Based) 20% Infusion 20.8 mL/Hr (20.8 mL/Hr) IV Continuous <Continuous>  hydrocortisone 1% Ointment 1 Application(s) Topical daily  insulin lispro (HumaLOG) corrective regimen sliding scale   SubCutaneous three times a day before meals  insulin lispro (HumaLOG) corrective regimen sliding scale   SubCutaneous at bedtime  loperamide 16 milliGRAM(s) Oral <User Schedule>  octreotide  Injectable 100 MICROGram(s) SubCutaneous three times a day  opium Tincture 6 milliGRAM(s) Oral <User Schedule>  pantoprazole  Injectable 40 milliGRAM(s) IV Push daily  Parenteral Nutrition - Adult 1 Each (75 mL/Hr) TPN Continuous <Continuous>  Parenteral Nutrition - Adult 1 Each (75 mL/Hr) TPN Continuous <Continuous>  sodium chloride 0.9%. 1000 milliLiter(s) (10 mL/Hr) IV Continuous <Continuous>    MEDICATIONS  (PRN):  fentaNYL    Injectable 25 MICROGram(s) IV Push every 10 minutes PRN Moderate Pain (4 - 6)  ondansetron Injectable 4 milliGRAM(s) IV Push once PRN Nausea and/or Vomiting  ondansetron Injectable 4 milliGRAM(s) IV Push every 6 hours PRN Nausea and/or Vomiting      Allergies    IV Contrast (Hives)    Intolerances        SOCIAL HISTORY: hx of EtOH abuse    FAMILY HISTORY: non-contributory      Vital Signs Last 24 Hrs  T(C): 36.7 (10 Isma 2020 13:00), Max: 37.3 (09 Jan 2020 20:58)  T(F): 98.1 (10 Isma 2020 13:00), Max: 99.2 (10 Isma 2020 09:03)  HR: 68 (10 Isma 2020 14:30) (68 - 90)  BP: 137/67 (10 Isma 2020 14:30) (109/58 - 137/67)  BP(mean): 93 (10 Isma 2020 14:30) (80 - 93)  RR: 16 (10 Isma 2020 14:30) (13 - 18)  SpO2: 100% (10 Isma 2020 14:30) (95% - 100%)    Physical Exam:  The patient was alert and oriented X 3, well nourished, and in no apparent distress. Oropharynx showed no ulcerations. There was no visible lymphadenopathy. Conjunctiva were non-injected. There was no clubbing or edema of extremities.    The scalp, hair, face, eyebrows, lips, oropharynx , neck, chest, back, buttocks, extremities X 4, hands, feet, nails were examined. There was no hyperhidrosis or bromhidrosis.     The following lesions are noted:   erythematous papules on upper and mid back    LABS:                        7.2    10.76 )-----------( 262      ( 10 Isma 2020 09:53 )             23.5     01-10    141  |  102  |  17  ----------------------------<  102<H>  3.9   |  31  |  0.54    Ca    8.0<L>      10 Isma 2020 07:14  Phos  4.0     01-10  Mg     1.9     01-10    TPro  5.6<L>  /  Alb  2.3<L>  /  TBili  0.6  /  DBili  x   /  AST  15  /  ALT  13  /  AlkPhos  74  01-10    PT/INR - ( 10 Isma 2020 08:20 )   PT: 13.4 sec;   INR: 1.18 ratio         PTT - ( 10 Isma 2020 08:20 )  PTT:52.6 sec      RADIOLOGY & ADDITIONAL STUDIES: no relevant studies

## 2020-01-10 NOTE — CONSULT NOTE ADULT - ASSESSMENT
74 year old man with rash on back likely secondary to miliaria rubra due to sweat trapping on back in the setting of prolonged hospitalization. Huber disease is also the differential diagnosis, although it is less likely given clinical history and lack of involvement on chest.    #Miliaria rubra  - Will likely self-resolve in several days  - Keep area cool and dry  - Discontinue topical hydrocortisone as occlusive creams/ointments may worsen rash  - If no improvement in several days or rash becomes symptomatic, ok to apply triamcinolone 0.1% cream BID to affected area PRN for itch

## 2020-01-10 NOTE — CHART NOTE - NSCHARTNOTEFT_GEN_A_CORE
STATUS POST:  VATS, with chest tube insertion for drainage of pleural effusion    POST OPERATIVE DAY #: 0    SUBJECTIVE: Pt seen and examined at bedside. Doing well. Pt c/o of discomfort on deep inspiration. Pt just received tylenol for pain.  Otherwise tolerating a diet. Pt states no change in respiratory status.   Denies nausea/emesis, chest pain or palpitations.    Vital Signs Last 24 Hrs  T(C): 36.8 (10 Isma 2020 16:55), Max: 37.3 (09 Jan 2020 20:58)  T(F): 98.3 (10 Isma 2020 16:55), Max: 99.2 (10 Isma 2020 09:03)  HR: 91 (10 Isma 2020 16:55) (68 - 96)  BP: 132/75 (10 Isma 2020 16:55) (109/58 - 153/73)  BP(mean): 93 (10 Isma 2020 14:30) (80 - 93)  RR: 18 (10 Isma 2020 16:55) (13 - 20)  SpO2: 95% (10 Isma 2020 16:55) (95% - 100%)  I&O's Summary    09 Jan 2020 07:01  -  10 Isma 2020 07:00  --------------------------------------------------------  IN: 5015 mL / OUT: 5175 mL / NET: -160 mL    10 Isma 2020 07:01  -  10 Isma 2020 17:26  --------------------------------------------------------  IN: 400 mL / OUT: 1545 mL / NET: -1145 mL      I&O's Detail    09 Jan 2020 07:01  -  10 Isma 2020 07:00  --------------------------------------------------------  IN:    fat emulsion (Fish Oil and Plant Based) 20% Infusion: 250 mL    Oral Fluid: 820 mL    sodium chloride 0.9%: 120 mL    Sodium Chloride 0.9% IV Bolus: 1000 mL    Solution: 50 mL    Solution: 975 mL    TPN (Total Parenteral Nutrition): 1800 mL  Total IN: 5015 mL    OUT:    Chest Tube: 150 mL    Ileostomy: 3275 mL    Voided: 1750 mL  Total OUT: 5175 mL    Total NET: -160 mL      10 Isma 2020 07:01  -  10 Isma 2020 17:26  --------------------------------------------------------  IN:    Oral Fluid: 100 mL    TPN (Total Parenteral Nutrition): 300 mL  Total IN: 400 mL    OUT:    Chest Tube: 150 mL    Chest Tube: 420 mL    Ileostomy: 425 mL    Voided: 550 mL  Total OUT: 1545 mL    Total NET: -1145 mL          MEDICATIONS  (STANDING):  acetaminophen   Tablet .. 975 milliGRAM(s) Oral every 6 hours  albuterol/ipratropium for Nebulization. 3 milliLiter(s) Nebulizer every 6 hours  buDESOnide    Inhalation Suspension 0.5 milliGRAM(s) Inhalation every 12 hours  chlorhexidine 2% Cloths 1 Application(s) Topical <User Schedule>  cholestyramine Powder (Sugar-Free) 4 Gram(s) Oral two times a day  diphenoxylate/atropine 2 Tablet(s) Oral <User Schedule>  enoxaparin Injectable 40 milliGRAM(s) SubCutaneous daily  fat emulsion (Fish Oil and Plant Based) 20% Infusion 20.8 mL/Hr (20.8 mL/Hr) IV Continuous <Continuous>  hydrocortisone 1% Ointment 1 Application(s) Topical daily  insulin lispro (HumaLOG) corrective regimen sliding scale   SubCutaneous three times a day before meals  insulin lispro (HumaLOG) corrective regimen sliding scale   SubCutaneous at bedtime  loperamide 16 milliGRAM(s) Oral <User Schedule>  octreotide  Injectable 100 MICROGram(s) SubCutaneous three times a day  opium Tincture 6 milliGRAM(s) Oral <User Schedule>  pantoprazole  Injectable 40 milliGRAM(s) IV Push daily  Parenteral Nutrition - Adult 1 Each (75 mL/Hr) TPN Continuous <Continuous>  Parenteral Nutrition - Adult 1 Each (75 mL/Hr) TPN Continuous <Continuous>  sodium chloride 0.9%. 1000 milliLiter(s) (10 mL/Hr) IV Continuous <Continuous>    MEDICATIONS  (PRN):  ondansetron Injectable 4 milliGRAM(s) IV Push every 6 hours PRN Nausea and/or Vomiting      LABS:                        7.2    10.76 )-----------( 262      ( 10 Isma 2020 09:53 )             23.5     01-10    141  |  102  |  17  ----------------------------<  102<H>  3.9   |  31  |  0.54    Ca    8.0<L>      10 Isma 2020 07:14  Phos  4.0     01-10  Mg     1.9     01-10    TPro  5.6<L>  /  Alb  2.3<L>  /  TBili  0.6  /  DBili  x   /  AST  15  /  ALT  13  /  AlkPhos  74  01-10    PT/INR - ( 10 Isma 2020 08:20 )   PT: 13.4 sec;   INR: 1.18 ratio         PTT - ( 10 Isma 2020 08:20 )  PTT:52.6 sec      PHYSICAL EXAM:  Gen: NAD, A&O x3  CHEST: breathing comfortably, L chest tube sero sang to suction, dressing c/d/i   Abdomen: Soft, non distended, non tender, ostomy +,+    A/P: 75y Male     73 y/o M presenting with septic shock and high grade SBO s/p exploratory laparotomy, lysis of adhesions, decompression of bowel via enterotomy w/ primary repair, and Abthera VAC placement on 12/6; s/p take down of Abthera, washout and re-application of the Abthera vac on 12/8. 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 s/p L chest tube placement by IR 12/30 for pleural effusion now s/p VATS, with chest tube insertion for drainage of pleural effusion    Plan   - pain control   - continue dysphagia diet   - MBS at 8:30 am tmrw  - F/U am CXR  - OOB as tolerated with PT  - monitor high chest tube and ileostomy output.  - cont 1:1 repletions  - cont TPN  - F/U Derm regarding back rash, cont creams. STATUS POST:  VATS, with chest tube insertion for drainage of pleural effusion    POST OPERATIVE DAY #: 0    SUBJECTIVE: Pt seen and examined at bedside. Doing well. Pt c/o of discomfort on deep inspiration. Pt just received tylenol for pain.  Otherwise tolerating a diet. Pt states no change in respiratory status.   Denies nausea/emesis, chest pain or palpitations.    Vital Signs Last 24 Hrs  T(C): 36.8 (10 Isma 2020 16:55), Max: 37.3 (09 Jan 2020 20:58)  T(F): 98.3 (10 Isma 2020 16:55), Max: 99.2 (10 Isma 2020 09:03)  HR: 91 (10 Isma 2020 16:55) (68 - 96)  BP: 132/75 (10 Isma 2020 16:55) (109/58 - 153/73)  BP(mean): 93 (10 Isma 2020 14:30) (80 - 93)  RR: 18 (10 Isma 2020 16:55) (13 - 20)  SpO2: 95% (10 Isma 2020 16:55) (95% - 100%)  I&O's Summary    09 Jan 2020 07:01  -  10 Isma 2020 07:00  --------------------------------------------------------  IN: 5015 mL / OUT: 5175 mL / NET: -160 mL    10 Isma 2020 07:01  -  10 Isma 2020 17:26  --------------------------------------------------------  IN: 400 mL / OUT: 1545 mL / NET: -1145 mL      I&O's Detail    09 Jan 2020 07:01  -  10 Isma 2020 07:00  --------------------------------------------------------  IN:    fat emulsion (Fish Oil and Plant Based) 20% Infusion: 250 mL    Oral Fluid: 820 mL    sodium chloride 0.9%: 120 mL    Sodium Chloride 0.9% IV Bolus: 1000 mL    Solution: 50 mL    Solution: 975 mL    TPN (Total Parenteral Nutrition): 1800 mL  Total IN: 5015 mL    OUT:    Chest Tube: 150 mL    Ileostomy: 3275 mL    Voided: 1750 mL  Total OUT: 5175 mL    Total NET: -160 mL      10 Isma 2020 07:01  -  10 Isma 2020 17:26  --------------------------------------------------------  IN:    Oral Fluid: 100 mL    TPN (Total Parenteral Nutrition): 300 mL  Total IN: 400 mL    OUT:    Chest Tube: 150 mL    Chest Tube: 420 mL    Ileostomy: 425 mL    Voided: 550 mL  Total OUT: 1545 mL    Total NET: -1145 mL          MEDICATIONS  (STANDING):  acetaminophen   Tablet .. 975 milliGRAM(s) Oral every 6 hours  albuterol/ipratropium for Nebulization. 3 milliLiter(s) Nebulizer every 6 hours  buDESOnide    Inhalation Suspension 0.5 milliGRAM(s) Inhalation every 12 hours  chlorhexidine 2% Cloths 1 Application(s) Topical <User Schedule>  cholestyramine Powder (Sugar-Free) 4 Gram(s) Oral two times a day  diphenoxylate/atropine 2 Tablet(s) Oral <User Schedule>  enoxaparin Injectable 40 milliGRAM(s) SubCutaneous daily  fat emulsion (Fish Oil and Plant Based) 20% Infusion 20.8 mL/Hr (20.8 mL/Hr) IV Continuous <Continuous>  hydrocortisone 1% Ointment 1 Application(s) Topical daily  insulin lispro (HumaLOG) corrective regimen sliding scale   SubCutaneous three times a day before meals  insulin lispro (HumaLOG) corrective regimen sliding scale   SubCutaneous at bedtime  loperamide 16 milliGRAM(s) Oral <User Schedule>  octreotide  Injectable 100 MICROGram(s) SubCutaneous three times a day  opium Tincture 6 milliGRAM(s) Oral <User Schedule>  pantoprazole  Injectable 40 milliGRAM(s) IV Push daily  Parenteral Nutrition - Adult 1 Each (75 mL/Hr) TPN Continuous <Continuous>  Parenteral Nutrition - Adult 1 Each (75 mL/Hr) TPN Continuous <Continuous>  sodium chloride 0.9%. 1000 milliLiter(s) (10 mL/Hr) IV Continuous <Continuous>    MEDICATIONS  (PRN):  ondansetron Injectable 4 milliGRAM(s) IV Push every 6 hours PRN Nausea and/or Vomiting      LABS:                        7.2    10.76 )-----------( 262      ( 10 Isma 2020 09:53 )             23.5     01-10    141  |  102  |  17  ----------------------------<  102<H>  3.9   |  31  |  0.54    Ca    8.0<L>      10 Isma 2020 07:14  Phos  4.0     01-10  Mg     1.9     01-10    TPro  5.6<L>  /  Alb  2.3<L>  /  TBili  0.6  /  DBili  x   /  AST  15  /  ALT  13  /  AlkPhos  74  01-10    PT/INR - ( 10 Isma 2020 08:20 )   PT: 13.4 sec;   INR: 1.18 ratio         PTT - ( 10 Isma 2020 08:20 )  PTT:52.6 sec      PHYSICAL EXAM:  Gen: NAD, A&O x3  CHEST: breathing comfortably, L chest tube sero sang to suction, dressing c/d/i   Abdomen: Soft, non distended, non tender, ostomy +,+    A/P: 75y Male     73 y/o M presenting with septic shock and high grade SBO s/p exploratory laparotomy, lysis of adhesions, decompression of bowel via enterotomy w/ primary repair, and Abthera VAC placement on 12/6; s/p take down of Abthera, washout and re-application of the Abthera vac on 12/8. 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 s/p L chest tube placement by IR 12/30 for pleural effusion now s/p VATS, with chest tube insertion for drainage of pleural effusion    Plan   - pain control   - continue dysphagia diet   - MBS at 8:30 am tmrw  - F/U am CXR  - OOB as tolerated with PT  - monitor high chest tube and ileostomy output.  - cont 1:1 repletions  - cont TPN  - F/U Derm consult regarding back rash, cont creams.

## 2020-01-10 NOTE — PROGRESS NOTE ADULT - ASSESSMENT
A/P: 74 year old male w/ PMH of COPD and EtOH dependence with PSH/o appy, prostate surgery, & spine surgery  septic shock and high grade SBO s/p exploratory laparotomy, lysis of adhesions, decompression of bowel via enterotomy w/ primary repair, and Abthera VAC placement on 12/6; s/p take down of Abthera, washout and re-application of the Abthera vac on 12/8. Now s/p SBR and end ileostomy on 12/10.   TPN consulted to assist w/ management of pt's nutrition in pt w/ prolonged hospital course now tolerating diet but has high Ileostomy output.  Pt s/p Lt Chest Pigtail for effusion in IR with persistent high output    Pt is s/p Video assisted thorascopic placement of Left Pleurx catheter  **Pt went to OR & now in PACU.  Please DO NOT stop TPN.  If TPN needs to be stopped or runs out- Please start D10 at the same rate. A new bag of TPN was ordered as per protocol.**   Continue TPN at full strength in pt w/ severe Protein-Calorie Malnutrition      - high ostomy output- pt eating more regularly- continue to monitor            Surgery monitoring and repleting w/ IVF prn      - Tolerating Dysphagia 1 Pureed-Nectar Consistency Fluid w/ Protein Supplements      - Strict Intake and Output- Continue  TPN & octreotide, lomotil, tincture of opium, & imodium  Pt tolerating TPN at GOAL:  120 grams amino acids  210 grams dextrose   50 grams SMOF /lipid   in 1800mL volume  Micronutrients: 10ml MVI, 3ml MTE-5    HypoNa  improving & will continue to monitor-maintaining K levels will help maintain Na  Elevated K  improving, will maintain KCl of 40 mEq KCl in TPN          possibly due to Octreotide  HypoPhos resolving, continue NaPhos of 45mMol   Fecal fat testing- specimen collection- 72hr fecal studies- pending        electrolytes & fat content to be assessed -see what pt is absorbing        to be better able to assess ongoing nutritional needs in pt with high ostomy output that is concerning             for Short Gut Syndrome   Improved glycemic control  - Insulin removed from TPN      Fingersticks & ISS coverage - to be ordered by primary team  RUE swelling -resolved  PICC w/dedicated port for only TPN - maintenance as per protocol  Weights three times a week  Monitor BMP, Mg, Ionized Ca, Phosphorus daily  Continue to monitor Triglycerides and Pre-albumin weekly  Continue as per Surgery, will follow with you, D/w primary team    Andreina Hubbard PA-C  TPN team, pager [phone]  D/w Ana M Siegel A/P: 74 year old male w/ PMH of COPD and EtOH dependence with PSH/o appy, prostate surgery, & spine surgery  septic shock and high grade SBO s/p exploratory laparotomy, lysis of adhesions, decompression of bowel via enterotomy w/ primary repair, and Abthera VAC placement on 12/6; s/p take down of Abthera, washout and re-application of the Abthera vac on 12/8. Now s/p SBR and end ileostomy on 12/10.   TPN consulted to assist w/ management of pt's nutrition in pt w/ prolonged hospital course now tolerating diet but has high Ileostomy output.  Pt s/p Lt Chest Pigtail for effusion in IR with persistent high output    Pt is s/p Video assisted thorascopic placement of Left PleurX catheter -post op care as per Surgery/ Thoracic  **Pt went to OR & now in PACU.  Please DO NOT stop TPN.  If TPN needs to be stopped or runs out- Please start D10 at the same rate. A new bag of TPN was ordered as per protocol.**   Continue TPN at full strength in pt w/ severe Protein-Calorie Malnutrition      - high ostomy output- pt eating more regularly- continue to monitor            Surgery monitoring and repleting w/ IVF prn      - Tolerating Dysphagia 1 Pureed-Nectar Consistency Fluid w/ Protein Supplements      - Strict Intake and Output- Continue  TPN & octreotide, lomotil, tincture of opium, & imodium  Pt tolerating TPN at GOAL:  120 grams amino acids  210 grams dextrose   50 grams SMOF /lipid   in 1800mL volume  Micronutrients: 10ml MVI, 3ml MTE-5    HypoNa  improving & will continue to monitor-maintaining K levels will help maintain Na  Elevated K  improving, will maintain KCl of 40 mEq KCl in TPN          possibly due to Octreotide  HypoPhos resolving, continue NaPhos of 45mMol   Fecal fat testing- specimen collection- 72hr fecal studies- pending        electrolytes & fat content to be assessed -see what pt is absorbing        to be better able to assess ongoing nutritional needs in pt with high ostomy output that is concerning             for Short Gut Syndrome   Improved glycemic control  - Insulin removed from TPN      Fingersticks & ISS coverage - to be ordered by primary team  RUE swelling -resolved  PICC w/dedicated port for only TPN - maintenance as per protocol  Weights three times a week  Monitor BMP, Mg, Ionized Ca, Phosphorus daily  Continue to monitor Triglycerides and Pre-albumin weekly  Continue as per Surgery, will follow with you, D/w primary team    Andreina Hubbard PA-C  TPN team, pager [phone]  D/w Ana M Siegel

## 2020-01-11 LAB
ALBUMIN SERPL ELPH-MCNC: 2 G/DL — LOW (ref 3.3–5)
ALP SERPL-CCNC: 70 U/L — SIGNIFICANT CHANGE UP (ref 40–120)
ALT FLD-CCNC: 13 U/L — SIGNIFICANT CHANGE UP (ref 10–45)
ANION GAP SERPL CALC-SCNC: 4 MMOL/L — LOW (ref 5–17)
AST SERPL-CCNC: 15 U/L — SIGNIFICANT CHANGE UP (ref 10–40)
BASOPHILS # BLD AUTO: 0.01 K/UL — SIGNIFICANT CHANGE UP (ref 0–0.2)
BASOPHILS NFR BLD AUTO: 0.1 % — SIGNIFICANT CHANGE UP (ref 0–2)
BILIRUB SERPL-MCNC: 0.6 MG/DL — SIGNIFICANT CHANGE UP (ref 0.2–1.2)
BUN SERPL-MCNC: 22 MG/DL — SIGNIFICANT CHANGE UP (ref 7–23)
CA-I BLD-SCNC: 1.09 MMOL/L — LOW (ref 1.12–1.3)
CALCIUM SERPL-MCNC: 7.6 MG/DL — LOW (ref 8.4–10.5)
CHLORIDE SERPL-SCNC: 99 MMOL/L — SIGNIFICANT CHANGE UP (ref 96–108)
CO2 SERPL-SCNC: 30 MMOL/L — SIGNIFICANT CHANGE UP (ref 22–31)
CREAT SERPL-MCNC: 0.5 MG/DL — SIGNIFICANT CHANGE UP (ref 0.5–1.3)
EOSINOPHIL # BLD AUTO: 0.46 K/UL — SIGNIFICANT CHANGE UP (ref 0–0.5)
EOSINOPHIL NFR BLD AUTO: 3.6 % — SIGNIFICANT CHANGE UP (ref 0–6)
GLUCOSE BLDC GLUCOMTR-MCNC: 115 MG/DL — HIGH (ref 70–99)
GLUCOSE BLDC GLUCOMTR-MCNC: 132 MG/DL — HIGH (ref 70–99)
GLUCOSE BLDC GLUCOMTR-MCNC: 138 MG/DL — HIGH (ref 70–99)
GLUCOSE BLDC GLUCOMTR-MCNC: 139 MG/DL — HIGH (ref 70–99)
GLUCOSE SERPL-MCNC: 114 MG/DL — HIGH (ref 70–99)
HCT VFR BLD CALC: 23.4 % — LOW (ref 39–50)
HGB BLD-MCNC: 7.1 G/DL — LOW (ref 13–17)
IMM GRANULOCYTES NFR BLD AUTO: 0.6 % — SIGNIFICANT CHANGE UP (ref 0–1.5)
LYMPHOCYTES # BLD AUTO: 1.42 K/UL — SIGNIFICANT CHANGE UP (ref 1–3.3)
LYMPHOCYTES # BLD AUTO: 11.2 % — LOW (ref 13–44)
MAGNESIUM SERPL-MCNC: 1.8 MG/DL — SIGNIFICANT CHANGE UP (ref 1.6–2.6)
MCHC RBC-ENTMCNC: 30 PG — SIGNIFICANT CHANGE UP (ref 27–34)
MCHC RBC-ENTMCNC: 30.3 GM/DL — LOW (ref 32–36)
MCV RBC AUTO: 98.7 FL — SIGNIFICANT CHANGE UP (ref 80–100)
MONOCYTES # BLD AUTO: 1.03 K/UL — HIGH (ref 0–0.9)
MONOCYTES NFR BLD AUTO: 8.1 % — SIGNIFICANT CHANGE UP (ref 2–14)
NEUTROPHILS # BLD AUTO: 9.69 K/UL — HIGH (ref 1.8–7.4)
NEUTROPHILS NFR BLD AUTO: 76.4 % — SIGNIFICANT CHANGE UP (ref 43–77)
PHOSPHATE SERPL-MCNC: 3.9 MG/DL — SIGNIFICANT CHANGE UP (ref 2.5–4.5)
PLATELET # BLD AUTO: 291 K/UL — SIGNIFICANT CHANGE UP (ref 150–400)
POTASSIUM SERPL-MCNC: 3.8 MMOL/L — SIGNIFICANT CHANGE UP (ref 3.5–5.3)
POTASSIUM SERPL-SCNC: 3.8 MMOL/L — SIGNIFICANT CHANGE UP (ref 3.5–5.3)
PROT SERPL-MCNC: 5.4 G/DL — LOW (ref 6–8.3)
RBC # BLD: 2.37 M/UL — LOW (ref 4.2–5.8)
RBC # FLD: 15.4 % — HIGH (ref 10.3–14.5)
SODIUM SERPL-SCNC: 133 MMOL/L — LOW (ref 135–145)
WBC # BLD: 12.69 K/UL — HIGH (ref 3.8–10.5)
WBC # FLD AUTO: 12.69 K/UL — HIGH (ref 3.8–10.5)

## 2020-01-11 PROCEDURE — 99232 SBSQ HOSP IP/OBS MODERATE 35: CPT

## 2020-01-11 PROCEDURE — 71045 X-RAY EXAM CHEST 1 VIEW: CPT | Mod: 26

## 2020-01-11 PROCEDURE — 74230 X-RAY XM SWLNG FUNCJ C+: CPT | Mod: 26

## 2020-01-11 PROCEDURE — 99231 SBSQ HOSP IP/OBS SF/LOW 25: CPT

## 2020-01-11 RX ORDER — I.V. FAT EMULSION 20 G/100ML
20.8 EMULSION INTRAVENOUS
Qty: 50 | Refills: 0 | Status: DISCONTINUED | OUTPATIENT
Start: 2020-01-11 | End: 2020-01-12

## 2020-01-11 RX ORDER — PANTOPRAZOLE SODIUM 20 MG/1
40 TABLET, DELAYED RELEASE ORAL
Refills: 0 | Status: DISCONTINUED | OUTPATIENT
Start: 2020-01-11 | End: 2020-02-04

## 2020-01-11 RX ORDER — ELECTROLYTE SOLUTION,INJ
1 VIAL (ML) INTRAVENOUS
Refills: 0 | Status: DISCONTINUED | OUTPATIENT
Start: 2020-01-11 | End: 2020-01-11

## 2020-01-11 RX ADMIN — CHOLESTYRAMINE 4 GRAM(S): 4 POWDER, FOR SUSPENSION ORAL at 08:12

## 2020-01-11 RX ADMIN — OCTREOTIDE ACETATE 100 MICROGRAM(S): 200 INJECTION, SOLUTION INTRAVENOUS; SUBCUTANEOUS at 13:12

## 2020-01-11 RX ADMIN — Medication 975 MILLIGRAM(S): at 13:12

## 2020-01-11 RX ADMIN — Medication 975 MILLIGRAM(S): at 18:48

## 2020-01-11 RX ADMIN — I.V. FAT EMULSION 20.8 ML/HR: 20 EMULSION INTRAVENOUS at 18:12

## 2020-01-11 RX ADMIN — Medication 975 MILLIGRAM(S): at 12:58

## 2020-01-11 RX ADMIN — Medication 1 APPLICATION(S): at 12:57

## 2020-01-11 RX ADMIN — Medication 0.5 MILLIGRAM(S): at 18:27

## 2020-01-11 RX ADMIN — Medication 3 MILLILITER(S): at 23:45

## 2020-01-11 RX ADMIN — Medication 0.5 MILLIGRAM(S): at 05:17

## 2020-01-11 RX ADMIN — CHOLESTYRAMINE 4 GRAM(S): 4 POWDER, FOR SUSPENSION ORAL at 21:45

## 2020-01-11 RX ADMIN — Medication 1 EACH: at 18:13

## 2020-01-11 RX ADMIN — CHLORHEXIDINE GLUCONATE 1 APPLICATION(S): 213 SOLUTION TOPICAL at 14:37

## 2020-01-11 RX ADMIN — Medication 2 TABLET(S): at 13:08

## 2020-01-11 RX ADMIN — Medication 975 MILLIGRAM(S): at 18:28

## 2020-01-11 RX ADMIN — ENOXAPARIN SODIUM 40 MILLIGRAM(S): 100 INJECTION SUBCUTANEOUS at 12:57

## 2020-01-11 RX ADMIN — OCTREOTIDE ACETATE 100 MICROGRAM(S): 200 INJECTION, SOLUTION INTRAVENOUS; SUBCUTANEOUS at 05:17

## 2020-01-11 RX ADMIN — Medication 16 MILLIGRAM(S): at 08:12

## 2020-01-11 RX ADMIN — MORPHINE 6 MILLIGRAM(S): 10 SOLUTION ORAL at 18:34

## 2020-01-11 RX ADMIN — Medication 3 MILLILITER(S): at 12:58

## 2020-01-11 RX ADMIN — Medication 975 MILLIGRAM(S): at 05:47

## 2020-01-11 RX ADMIN — Medication 2 TABLET(S): at 23:45

## 2020-01-11 RX ADMIN — MORPHINE 6 MILLIGRAM(S): 10 SOLUTION ORAL at 08:11

## 2020-01-11 RX ADMIN — Medication 16 MILLIGRAM(S): at 12:58

## 2020-01-11 RX ADMIN — MORPHINE 6 MILLIGRAM(S): 10 SOLUTION ORAL at 13:08

## 2020-01-11 RX ADMIN — Medication 3 MILLILITER(S): at 05:17

## 2020-01-11 RX ADMIN — Medication 975 MILLIGRAM(S): at 23:47

## 2020-01-11 RX ADMIN — MORPHINE 6 MILLIGRAM(S): 10 SOLUTION ORAL at 23:45

## 2020-01-11 RX ADMIN — Medication 3 MILLILITER(S): at 18:27

## 2020-01-11 RX ADMIN — PANTOPRAZOLE SODIUM 40 MILLIGRAM(S): 20 TABLET, DELAYED RELEASE ORAL at 12:57

## 2020-01-11 RX ADMIN — Medication 16 MILLIGRAM(S): at 18:28

## 2020-01-11 RX ADMIN — Medication 2 TABLET(S): at 08:11

## 2020-01-11 RX ADMIN — Medication 16 MILLIGRAM(S): at 23:46

## 2020-01-11 RX ADMIN — Medication 2 TABLET(S): at 18:34

## 2020-01-11 RX ADMIN — OCTREOTIDE ACETATE 100 MICROGRAM(S): 200 INJECTION, SOLUTION INTRAVENOUS; SUBCUTANEOUS at 21:45

## 2020-01-11 RX ADMIN — SODIUM CHLORIDE 10 MILLILITER(S): 9 INJECTION INTRAMUSCULAR; INTRAVENOUS; SUBCUTANEOUS at 18:13

## 2020-01-11 RX ADMIN — Medication 975 MILLIGRAM(S): at 05:17

## 2020-01-11 NOTE — SWALLOW VFSS/MBS ASSESSMENT ADULT - INTACT
Limited visualization due to reduced amount of contrast on single small bolus. However, Pt with episode of Pt c/o material "going down the wrong way" prior to swallow, followed by cough. Therefore, further trials of hard solids discontinued.

## 2020-01-11 NOTE — PROGRESS NOTE ADULT - ASSESSMENT
73 y/o M presenting with septic shock and high grade SBO s/p exploratory laparotomy, lysis of adhesions, decompression of bowel via enterotomy w/ primary repair, and Abthera VAC placement on 12/6; s/p take down of Abthera, washout and re-application of the Abthera vac on 12/8. Now s/p SBR and end ileostomy on 12/10 acute respiratory distress now improving, Pneumothorax, hyperglycemia, delirium. Now still with persistent pneumothorax when chest pigtail clamped.   12/23 VSS on room air recommending IR drainage of left chest  12/24 No evidence of air leak to left chest tube. Tube clamped. Repeat chest cxr in 4 hours. Anticipate chest tube removal if lung remains expanded. Drainage of loculated left effusion discussed with IR. IR to reconsult for drainage once initial tube removed. Discussed plan with Dr Mayes and IR Fellow  12/24  On NC ,    VSSchest xray sm lt pl eff, left chest tube dc'd.  12/26    2l NC   co  sob,  lt side diminshed  12/27 VSS, CXR with unchanged loculated left pleural effusion- IR consulted for pigtail placement, states effusion can be drained via thoracentesis, pigtail placement not indicated at this time - Dr. Mayes to speak with IR attending.   12/30 Patient with persistent PTX  and left pleural effusion now for IR drainage with Dr Elkins.   12/31 HD stable, L PTC w/ high output, serosanguinous drainage, 990 ml overnight/ 2200 in 24 hrs. Tolerating 2L NC supplemental O2, Cyto pending, continue monitor drainage output.   1/1: LPTC still with High output-400/24h. Continue with pigtail cath drain.  1/ 2     lt pigtail draining  on lws  1/3 HD stable, left pigtail catheter continues to drain. Incentive spirometer encouraged, OOB and mobility, continue pulmonary toileting. Maintain chest tube to suction and monitor output.   1/4: Chest tube milked to prevent clogging of tube, Continues to drain with high out put.  1/5 remains with high out put 170/700. Continue drainage  1/6  persistent drainage from lt pl tube  1/7 Left pigtail 150/450. Daily CXR. Planning for pleurex cath placement on Friday 1/10 w/ Dr. Mayes  1/8        Lt pleural tube   persistent draining    1/9 preop for pleurX placement tomorrow. CT clamped. NPO after MN, type and cross x2.   1/10 s/p VATS L pleurx cath placement   1/11 HD stable, no resp distress, maintain L pleurex to suction overnight, CXR in AM.

## 2020-01-11 NOTE — SWALLOW VFSS/MBS ASSESSMENT ADULT - LARYNGEAL PENETRATION DURING THE SWALLOW - SILENT
Trace/Mild/over laryngeal surface of arytenoids, deeper with consecutive cup sips, appeared to be spontaneously retrieved during swallow or with subsequent swallows deep, appears to be retrieved with repeat swallows/Trace

## 2020-01-11 NOTE — PROGRESS NOTE ADULT - PROBLEM SELECTOR PLAN 1
sp  IR placed lt pigtail  s/p VATS, L pleurex cath placement 1/10  will take of to suction and water seal when appropriate  will cap pleurX when appropriate  daily chest xray while chest tube in place  STAT CXR if resp distress or condition changes  continue monitor chest tube output, strict Is and Os  continue pulm toilet  pls mobilize pt OOB to bedside chair

## 2020-01-11 NOTE — PROGRESS NOTE ADULT - ASSESSMENT
74 y/o M presenting with septic shock and high grade SBO s/p exploratory laparotomy, lysis of adhesions, decompression of bowel via enterotomy w/ primary repair, and Abthera VAC placement on 12/6; s/p take down of Abthera, washout and re-application of the Abthera vac on 12/8. 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 s/p L chest tube placement by IR 12/30 for pleural effusion now s/p VATS, with chest tube insertion for drainage of pleural effusion    Plan   - pain control   - continue dysphagia diet   - MBS this AM, f/u results  - F/U am CXR  - OOB as tolerated with PT  - monitor high chest tube and ileostomy output.  - cont 1:1 repletions  - cont TPN  - F/U Derm consult regarding back rash 1/10: no creams needed, "If no improvement in several days or rash becomes symptomatic, ok to apply triamcinolone 0.1% cream BID to affected area PRN for itch"  - per CTS, keep CT to [facility]    RED SURGERY  [id number] 76 y/o M presenting with septic shock and high grade SBO s/p exploratory laparotomy, lysis of adhesions, decompression of bowel via enterotomy w/ primary repair, and Abthera VAC placement on 12/6; s/p take down of Abthera, washout and re-application of the Abthera vac on 12/8. 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 s/p L chest tube placement by IR 12/30 for pleural effusion now s/p VATS, with chest tube insertion for drainage of pleural effusion    Plan   - pain control   - continue dysphagia diet   - MBS 1/11, per s/s: Mechanical soft diet with thin liquids via SMALL SINGLE SIPS  MD/team Please enter the following as notes to RN: 1) Full supervision with meals, 2) Crush meds or provide via alternate source, as needed, 3) Small single bites and sips at slow rate, 4) Encourage successive swallows for oral and pharyngeal clearance, 5) Alternate food and liquids to aid in oral and pharyngeal clearance, 6) Aspiration precautions. Monitor for s/s aspiration/laryngeal penetration. If noted:  D/C p.o. intake, provide non-oral nutrition/hydration/meds  - F/U am CXR  - OOB as tolerated with PT  - monitor high chest tube and ileostomy output.  - cont 1:1 repletions  - cont TPN  - F/U Derm consult regarding back rash 1/10: no creams needed, "If no improvement in several days or rash becomes symptomatic, ok to apply triamcinolone 0.1% cream BID to affected area PRN for itch"  - per CTS, keep CT to [facility]    RED SURGERY  [id number]

## 2020-01-11 NOTE — PROGRESS NOTE ADULT - ASSESSMENT
74 year old male w/ PMH of COPD and EtOH dependence with PSH/o appy, prostate surgery, & spine surgery  septic shock and high grade SBO s/p exploratory laparotomy, lysis of adhesions, decompression of bowel via enterotomy w/ primary repair, and Abthera VAC placement on 12/6; s/p take down of Abthera, washout and re-application of the Abthera vac on 12/8. Now s/p SBR and end ileostomy on 12/10.   TPN consulted to assist w/ management of pt's nutrition in pt w/ prolonged hospital course now tolerating diet but has high Ileostomy output.  Pt s/p Lt Chest Pigtail for effusion in IR with persistent high output. Pt is s/p Video assisted thorascopic placement of Left PleurX catheter 1/10.    -Continue TPN at full strength in pt w/ severe Protein-Calorie Malnutrition in setting of high ostomy output. Tolerating Dysphagia 1 Pureed-Nectar Consistency Fluid w/ Protein Supplements  - Strict Intake and Output-  Surgery monitoring and repleting 1:1  w/ IVF. Continue  TPN & octreotide, lomotil, tincture of opium, & imodium  - HypoNa  improving & will continue to monitor-maintaining K levels will help maintain Na  - HyperK  improving, will maintain KCl of 40 mEq KCl in TPN  - HypoPhos resolving, continue NaPhos of 45mMol   - Short Gut Syndrome Fecal fat testing- 72hr fecal studies- pending, electrolytes & fat content to be assessed -see what pt is absorbing  - Hyperglycemia - Insulin removed from TPN. Fingersticks & ISS coverage.  - PICC w/dedicated port for only TPN - maintenance as per protocol  - Weights three times a week  - Monitor BMP, Mg, Ionized Ca, Phosphorus daily  - Continue to monitor Triglycerides and Pre-albumin weekly  - Continue as per Surgery, will follow with you, D/w primary team      TPN team, pager [phone]  D/w Ana M Siegel 74 year old male w/ PMH of COPD and EtOH dependence with PSH/o appy, prostate surgery, & spine surgery  septic shock and high grade SBO s/p exploratory laparotomy, lysis of adhesions, decompression of bowel via enterotomy w/ primary repair, and Abthera VAC placement on 12/6; s/p take down of Abthera, washout and re-application of the Abthera vac on 12/8. Now s/p SBR and end ileostomy on 12/10.   TPN consulted to assist w/ management of pt's nutrition in pt w/ prolonged hospital course now tolerating diet but has high Ileostomy output.  Pt s/p Lt Chest Pigtail for effusion in IR with persistent high output. Pt is s/p Video assisted thorascopic placement of Left PleurX catheter 1/10.    -Continue TPN at full strength in pt w/ severe Protein-Calorie Malnutrition in setting of high ostomy output. Tolerating Dysphagia 1 Pureed-Nectar Consistency Fluid w/ Protein Supplements  - Strict Intake and Output-  Surgery monitoring and repleting 1:1  w/ IVF. Continue  TPN & octreotide, lomotil, tincture of opium, & imodium  - f/u Barium Swallow study results  - h/o HypoNa  improved, continue to monitor  - h/o HypoK and  HyperK  improving, will maintain KCl of 40 mEq KCl in TPN  - h/o HypoPhos resolving, continue NaPhos of 45mMol   - Short Gut Syndrome Fecal fat testing- 72hr fecal studies- pending, electrolytes & fat content to be assessed -see what pt is absorbing  - Hyperglycemia - continue Fingersticks & ISS coverage.  - PICC w/dedicated port for only TPN - maintenance as per protocol  - Weights three times a week  - Monitor BMP, Mg, Ionized Ca, Phosphorus daily- due 1/13  - Continue to monitor Triglycerides and Pre-albumin weekly- due 1/13  - Continue as per Surgery, will follow with you, D/w primary team      TPN team, pager [phone]  D/w Ana M Siegel 74 year old male w/ PMH of COPD and EtOH dependence with PSH/o appy, prostate surgery, & spine surgery  septic shock and high grade SBO s/p exploratory laparotomy, lysis of adhesions, decompression of bowel via enterotomy w/ primary repair, and Abthera VAC placement on 12/6; s/p take down of Abthera, washout and re-application of the Abthera vac on 12/8. Now s/p SBR and end ileostomy on 12/10.   TPN consulted to assist w/ management of pt's nutrition in pt w/ prolonged hospital course now tolerating diet but has high Ileostomy output.  Pt s/p Lt Chest Pigtail for effusion in IR with persistent high output. Pt is s/p Video assisted thorascopic placement of Left PleurX catheter 1/10.    -Continue TPN at full strength in pt w/ severe Protein-Calorie Malnutrition in setting of high ostomy output. Tolerating Dysphagia 1 Pureed-Nectar Consistency Fluid w/ Protein Supplements  - Strict Intake and Output-  Surgery monitoring and repleting 1:1  w/ IVF. Continue  TPN & octreotide, lomotil, tincture of opium, & imodium  - f/u Barium Swallow study results  - h/o HypoNa  improved, continue to monitor  - h/o HypoK and  HyperK  improving, will maintain KCl of 40 mEq KCl in TPN  - h/o HypoPhos resolving, continue NaPhos of 45mMol   - Short Gut Syndrome Fecal fat testing- 72hr fecal studies- pending, electrolytes & fat content to be assessed -see what pt is absorbing  - Hyperglycemia - continue Fingersticks & ISS coverage.  - PICC w/dedicated port for only TPN - maintenance as per protocol  - Weights three times a week  - Monitor BMP, Mg, Ionized Ca, Phosphorus daily.  - Continue to monitor Triglycerides and Pre-albumin weekly- due 1/13  - Continue as per Surgery, will follow with you, D/w primary team      TPN team, pager [phone]  D/w Ana M Siegel

## 2020-01-11 NOTE — SWALLOW VFSS/MBS ASSESSMENT ADULT - UNSUCCESSFUL STRATEGIES TRIALED DURING PROCEDURE
Penetration/Aspiration Scale score of 3; chin tuck appeared to exacerbate depth of laryngeal penetration and prevented spontaneous clearance/chin tuck

## 2020-01-11 NOTE — SWALLOW VFSS/MBS ASSESSMENT ADULT - NS SWALLOW VFSS REC ASPIR MON
pneumonia/cough/gurgly voice/throat clearing/fever/change of breathing pattern/upper respiratory infection/Monitor for s/s aspiration/laryngeal penetration. If noted:  D/C p.o. intake, provide non-oral nutrition/hydration/meds, and contact this service @ [phone]

## 2020-01-11 NOTE — SWALLOW VFSS/MBS ASSESSMENT ADULT - COMMENTS
Hosp Course cont'd:  Pt seen for bedside swallow evaluation on 12/21 with recommendations for dysphagia 1 diet and nectar thickened liquids.  FEES was also recommended at that time to further assess swallow mechanism, but was deferred multiple times due to pt’s NPO status for tentative procedures.  12/24: started on acyclovir for concern for herpetic rash around mouth; pigtail removed. 12/27: CXR with unchanged loculated left pleural effusion -> s/p left chest tube insertion on 12/30/19 in Interventional Radiology. 12/30: pt hypotensive 79/41 -> bolus given.

## 2020-01-11 NOTE — PROGRESS NOTE ADULT - SUBJECTIVE AND OBJECTIVE BOX
Subjective: Patient seen and examined. No new events except as noted.     REVIEW OF SYSTEMS:    CONSTITUTIONAL:+ weakness, fevers or chills  EYES/ENT: No visual changes;  No vertigo or throat pain   NECK: No pain or stiffness  RESPIRATORY: No cough, wheezing, hemoptysis; No shortness of breath  CARDIOVASCULAR: No chest pain or palpitations  GASTROINTESTINAL: No abdominal or epigastric pain. No nausea, vomiting, or hematemesis; No diarrhea or constipation. No melena or hematochezia.  GENITOURINARY: No dysuria, frequency or hematuria  NEUROLOGICAL: No numbness or weakness  SKIN: No itching, burning, rashes, or lesions   All other review of systems is negative unless indicated above.    MEDICATIONS:  MEDICATIONS  (STANDING):  acetaminophen   Tablet .. 975 milliGRAM(s) Oral every 6 hours  albuterol/ipratropium for Nebulization. 3 milliLiter(s) Nebulizer every 6 hours  buDESOnide    Inhalation Suspension 0.5 milliGRAM(s) Inhalation every 12 hours  chlorhexidine 2% Cloths 1 Application(s) Topical <User Schedule>  cholestyramine Powder (Sugar-Free) 4 Gram(s) Oral two times a day  diphenoxylate/atropine 2 Tablet(s) Oral <User Schedule>  enoxaparin Injectable 40 milliGRAM(s) SubCutaneous daily  fat emulsion (Fish Oil and Plant Based) 20% Infusion 20.8 mL/Hr (20.8 mL/Hr) IV Continuous <Continuous>  insulin lispro (HumaLOG) corrective regimen sliding scale   SubCutaneous three times a day before meals  insulin lispro (HumaLOG) corrective regimen sliding scale   SubCutaneous at bedtime  loperamide 16 milliGRAM(s) Oral <User Schedule>  octreotide  Injectable 100 MICROGram(s) SubCutaneous three times a day  opium Tincture 6 milliGRAM(s) Oral <User Schedule>  pantoprazole    Tablet 40 milliGRAM(s) Oral before breakfast  Parenteral Nutrition - Adult 1 Each (75 mL/Hr) TPN Continuous <Continuous>  Parenteral Nutrition - Adult 1 Each (75 mL/Hr) TPN Continuous <Continuous>  sodium chloride 0.9%. 1000 milliLiter(s) (10 mL/Hr) IV Continuous <Continuous>      PHYSICAL EXAM:  T(C): 36.8 (01-11-20 @ 16:51), Max: 37.4 (01-10-20 @ 21:39)  HR: 81 (01-11-20 @ 16:51) (81 - 93)  BP: 105/60 (01-11-20 @ 16:51) (93/57 - 116/65)  RR: 18 (01-11-20 @ 16:51) (18 - 18)  SpO2: 97% (01-11-20 @ 16:51) (93% - 97%)  Wt(kg): --  I&O's Summary    10 Isma 2020 07:01  -  11 Jan 2020 07:00  --------------------------------------------------------  IN: 3576.6 mL / OUT: 4095 mL / NET: -518.4 mL    11 Jan 2020 07:01  -  11 Jan 2020 19:57  --------------------------------------------------------  IN: 2966.6 mL / OUT: 2600 mL / NET: 366.6 mL            Appearance: NAD   HEENT:   Dry oral mucosa, crusted lips   Lymphatic: No lymphadenopathy   Cardiovascular: Normal S1 S2, No JVD, No murmurs , Peripheral pulses palpable 2+ bilaterally  Respiratory: Decreased bs   Gastrointestinal:  Soft, NT, ND. Ostomy pink with output. Midline staples in place, midline incision site is c/d/i   Skin: No rashes, No ecchymoses, No cyanosis, warm to touch  Musculoskeletal: Decreased  range of motion and strength  Psychiatry:  mildly sedated   Ext: No edema +PICC line   +rosas     LABS:    CARDIAC MARKERS:                                7.1    12.69 )-----------( 291      ( 11 Jan 2020 10:37 )             23.4     01-11    133<L>  |  99  |  22  ----------------------------<  114<H>  3.8   |  30  |  0.50    Ca    7.6<L>      11 Jan 2020 07:15  Phos  3.9     01-11  Mg     1.8     01-11    TPro  5.4<L>  /  Alb  2.0<L>  /  TBili  0.6  /  DBili  x   /  AST  15  /  ALT  13  /  AlkPhos  70  01-11    proBNP:   Lipid Profile:   HgA1c:   TSH:             TELEMETRY: 	    ECG:  	  RADIOLOGY:   DIAGNOSTIC TESTING:  [ ] Echocardiogram:  [ ]  Catheterization:  [ ] Stress Test:    OTHER:

## 2020-01-11 NOTE — SWALLOW VFSS/MBS ASSESSMENT ADULT - RECOMMENDED FEEDING/EATING TECHNIQUES
maintain upright posture during/after eating for 30 mins/oral hygiene/position upright (90 degrees)/alternate food with liquid/small sips/bites

## 2020-01-11 NOTE — PROGRESS NOTE ADULT - SUBJECTIVE AND OBJECTIVE BOX
Video assisted thorascopic placement of Left Pleurx catheter.    [facility] NUTRITION SUPPORT--  Attending/ PA FOLLOW UP NOTE      24 hour events/subjective: Denies Palpitations, chest pain, shortness of breath. Denies nausea nor vomiting nor abdominal pain.        PAST HISTORY  --------------------------------------------------------------------------------  No significant changes to PMH, PSH, FHx, SHx, unless otherwise noted    ALLERGIES & MEDICATIONS  --------------------------------------------------------------------------------  Allergies    IV Contrast (Hives)    Intolerances      Standing Inpatient Medications  acetaminophen   Tablet .. 975 milliGRAM(s) Oral every 6 hours  albuterol/ipratropium for Nebulization. 3 milliLiter(s) Nebulizer every 6 hours  buDESOnide    Inhalation Suspension 0.5 milliGRAM(s) Inhalation every 12 hours  chlorhexidine 2% Cloths 1 Application(s) Topical <User Schedule>  cholestyramine Powder (Sugar-Free) 4 Gram(s) Oral two times a day  diphenoxylate/atropine 2 Tablet(s) Oral <User Schedule>  enoxaparin Injectable 40 milliGRAM(s) SubCutaneous daily  hydrocortisone 1% Ointment 1 Application(s) Topical daily  insulin lispro (HumaLOG) corrective regimen sliding scale   SubCutaneous three times a day before meals  insulin lispro (HumaLOG) corrective regimen sliding scale   SubCutaneous at bedtime  loperamide 16 milliGRAM(s) Oral <User Schedule>  octreotide  Injectable 100 MICROGram(s) SubCutaneous three times a day  opium Tincture 6 milliGRAM(s) Oral <User Schedule>  pantoprazole  Injectable 40 milliGRAM(s) IV Push daily  Parenteral Nutrition - Adult 1 Each TPN Continuous <Continuous>  sodium chloride 0.9%. 1000 milliLiter(s) IV Continuous <Continuous>    PRN Inpatient Medications  ondansetron Injectable 4 milliGRAM(s) IV Push every 6 hours PRN      REVIEW OF SYSTEMS  --------------------------------------------------------------------------------  Gen: as per HPI  Skin: No rashes  Head/Eyes/Ears/Mouth: No headache;No sore throat  Respiratory: No dyspnea, cough,   CV: No chest pain, PND, orthopnea  GI: as per HPI  : No increased frequency, dysuria, hematuria, nocturia  MSK: No joint pain/swelling; no back pain; no edema  Neuro: No dizziness/lightheadedness, weakness, seizures, numbness, tingling  Psych: No significant nervousness, anxiety, stress, depression    All other systems were reviewed and are negative, except as noted.      LABS/STUDIES  --------------------------------------------------------------------------------              7.2    10.76 >-----------<  262      [01-10-20 @ 09:53]              23.5     141  |  102  |  17  ----------------------------<  102      [01-10-20 @ 07:14]  3.9   |  31  |  0.54        Ca     8.0     [01-10-20 @ 07:14]      iCa    1.09     [01-11 @ 07:48]      Mg     1.9     [01-10-20 @ 07:14]      Phos  4.0     [01-10-20 @ 07:14]    TPro  5.6  /  Alb  2.3  /  TBili  0.6  /  DBili  x   /  AST  15  /  ALT  13  /  AlkPhos  74  [01-10-20 @ 07:14]    PT/INR: PT 13.4 , INR 1.18       [01-10-20 @ 08:20]  PTT: 52.6       [01-10-20 @ 08:20]          PrealbuminPrealbumin, Serum: 13 mg/dL (01-07-20 @ 02:35)  Prealbumin, Serum: 14 mg/dL (01-06-20 @ 07:40)  Prealbumin, Serum: 16 mg/dL (01-01-20 @ 02:48)  Prealbumin, Serum: 15 mg/dL (12-31-19 @ 03:11)  Prealbumin, Serum: 16 mg/dL (12-30-19 @ 07:53)    Triglycerides      01-10-20 @ 07:01  -  01-11-20 @ 07:00  --------------------------------------------------------  IN: 3576.6 mL / OUT: 4095 mL / NET: -518.4 mL        VITALS/PHYSICAL EXAM  --------------------------------------------------------------------------------  T(C): 37.1 (01-11-20 @ 04:56), Max: 37.4 (01-10-20 @ 21:39)  HR: 88 (01-11-20 @ 04:56) (68 - 96)  BP: 116/65 (01-11-20 @ 04:56) (93/57 - 153/73)  RR: 18 (01-11-20 @ 04:56) (13 - 20)  SpO2: 93% (01-11-20 @ 04:56) (93% - 100%)  Wt(kg): --    Physical Exam:  	Gen: NAD, well-appearing  	HEENT: PERRL, midline trach          Chest: non labored breathing, equal chest expansion b/l  	Abd: +BS, soft, nontender/nondistended  	UE: Warm, FROM, intact strength; no edema; no asterixis  	LE: Warm, FROM, intact strength; no edema  	Neuro: No focal deficits, intact gait  	Psych: Normal affect and mood  	Skin: Warm, without rashes [facility] NUTRITION SUPPORT--  Attending/ PA FOLLOW UP NOTE      24 hour events/subjective: No acute events O/N. Pt on TPN since 12/14, and tolerating without issues- denies palpitations, chest pain, shortness of breath. Denies nausea nor vomiting nor abdominal pain. Calorie count initiated. Most recently, left pleural effusion s/p Video assisted thorascopic placement of Left Pleurx catheter yesterday.        PAST HISTORY  --------------------------------------------------------------------------------  No significant changes to PMH, PSH, FHx, SHx, unless otherwise noted    ALLERGIES & MEDICATIONS  --------------------------------------------------------------------------------  Allergies    IV Contrast (Hives)    Intolerances      Standing Inpatient Medications  acetaminophen   Tablet .. 975 milliGRAM(s) Oral every 6 hours  albuterol/ipratropium for Nebulization. 3 milliLiter(s) Nebulizer every 6 hours  buDESOnide    Inhalation Suspension 0.5 milliGRAM(s) Inhalation every 12 hours  chlorhexidine 2% Cloths 1 Application(s) Topical <User Schedule>  cholestyramine Powder (Sugar-Free) 4 Gram(s) Oral two times a day  diphenoxylate/atropine 2 Tablet(s) Oral <User Schedule>  enoxaparin Injectable 40 milliGRAM(s) SubCutaneous daily  hydrocortisone 1% Ointment 1 Application(s) Topical daily  insulin lispro (HumaLOG) corrective regimen sliding scale   SubCutaneous three times a day before meals  insulin lispro (HumaLOG) corrective regimen sliding scale   SubCutaneous at bedtime  loperamide 16 milliGRAM(s) Oral <User Schedule>  octreotide  Injectable 100 MICROGram(s) SubCutaneous three times a day  opium Tincture 6 milliGRAM(s) Oral <User Schedule>  pantoprazole  Injectable 40 milliGRAM(s) IV Push daily  Parenteral Nutrition - Adult 1 Each TPN Continuous <Continuous>  sodium chloride 0.9%. 1000 milliLiter(s) IV Continuous <Continuous>    PRN Inpatient Medications  ondansetron Injectable 4 milliGRAM(s) IV Push every 6 hours PRN      REVIEW OF SYSTEMS  --------------------------------------------------------------------------------  Gen: as per HPI  Skin: No rashes  Head/Eyes/Ears/Mouth: No headache;No sore throat  Respiratory: No dyspnea, cough,   CV: No chest pain, PND, orthopnea  GI: as per HPI  : No increased frequency, dysuria, hematuria, nocturia  MSK: No joint pain/swelling; no back pain; no edema  Neuro: No dizziness/lightheadedness, weakness, seizures, numbness, tingling  Psych: No significant nervousness, anxiety, stress, depression    All other systems were reviewed and are negative, except as noted.      LABS/STUDIES  --------------------------------------------------------------------------------              7.2    10.76 >-----------<  262      [01-10-20 @ 09:53]              23.5     141  |  102  |  17  ----------------------------<  102      [01-10-20 @ 07:14]  3.9   |  31  |  0.54        Ca     8.0     [01-10-20 @ 07:14]      iCa    1.09     [01-11 @ 07:48]      Mg     1.9     [01-10-20 @ 07:14]      Phos  4.0     [01-10-20 @ 07:14]    TPro  5.6  /  Alb  2.3  /  TBili  0.6  /  DBili  x   /  AST  15  /  ALT  13  /  AlkPhos  74  [01-10-20 @ 07:14]    PT/INR: PT 13.4 , INR 1.18       [01-10-20 @ 08:20]  PTT: 52.6       [01-10-20 @ 08:20]          PrealbuminPrealbumin, Serum: 13 mg/dL (01-07-20 @ 02:35)  Prealbumin, Serum: 14 mg/dL (01-06-20 @ 07:40)  Prealbumin, Serum: 16 mg/dL (01-01-20 @ 02:48)  Prealbumin, Serum: 15 mg/dL (12-31-19 @ 03:11)  Prealbumin, Serum: 16 mg/dL (12-30-19 @ 07:53)    Triglycerides      01-10-20 @ 07:01  -  01-11-20 @ 07:00  --------------------------------------------------------  IN: 3576.6 mL / OUT: 4095 mL / NET: -518.4 mL        VITALS/PHYSICAL EXAM  --------------------------------------------------------------------------------  T(C): 37.1 (01-11-20 @ 04:56), Max: 37.4 (01-10-20 @ 21:39)  HR: 88 (01-11-20 @ 04:56) (68 - 96)  BP: 116/65 (01-11-20 @ 04:56) (93/57 - 153/73)  RR: 18 (01-11-20 @ 04:56) (13 - 20)  SpO2: 93% (01-11-20 @ 04:56) (93% - 100%)  Wt(kg): --    Physical Exam:  	Gen: NAD, well-appearing  	HEENT: PERRL, midline trach          Chest: non labored breathing, equal chest expansion b/l  	Abd: +BS, soft, nontender/nondistended  	UE: Warm, FROM, intact strength; no edema; no asterixis  	LE: Warm, FROM, intact strength; no edema  	Neuro: No focal deficits, intact gait  	Psych: Normal affect and mood  	Skin: Warm, without rashes [facility] NUTRITION SUPPORT--  Attending/ PA FOLLOW UP NOTE      24 hour events/subjective: No acute events O/N. Pt on TPN since 12/14, and tolerating without issues- denies palpitations, chest pain, shortness of breath. Denies nausea nor vomiting nor abdominal pain. Calorie count initiated. Most recently, left pleural effusion s/p Video assisted thorascopic placement of Left Pleurx catheter yesterday.        PAST HISTORY  --------------------------------------------------------------------------------  No significant changes to PMH, PSH, FHx, SHx, unless otherwise noted    ALLERGIES & MEDICATIONS  --------------------------------------------------------------------------------  Allergies    IV Contrast (Hives)    Intolerances      Standing Inpatient Medications  acetaminophen   Tablet .. 975 milliGRAM(s) Oral every 6 hours  albuterol/ipratropium for Nebulization. 3 milliLiter(s) Nebulizer every 6 hours  buDESOnide    Inhalation Suspension 0.5 milliGRAM(s) Inhalation every 12 hours  chlorhexidine 2% Cloths 1 Application(s) Topical <User Schedule>  cholestyramine Powder (Sugar-Free) 4 Gram(s) Oral two times a day  diphenoxylate/atropine 2 Tablet(s) Oral <User Schedule>  enoxaparin Injectable 40 milliGRAM(s) SubCutaneous daily  hydrocortisone 1% Ointment 1 Application(s) Topical daily  insulin lispro (HumaLOG) corrective regimen sliding scale   SubCutaneous three times a day before meals  insulin lispro (HumaLOG) corrective regimen sliding scale   SubCutaneous at bedtime  loperamide 16 milliGRAM(s) Oral <User Schedule>  octreotide  Injectable 100 MICROGram(s) SubCutaneous three times a day  opium Tincture 6 milliGRAM(s) Oral <User Schedule>  pantoprazole  Injectable 40 milliGRAM(s) IV Push daily  Parenteral Nutrition - Adult 1 Each TPN Continuous <Continuous>  sodium chloride 0.9%. 1000 milliLiter(s) IV Continuous <Continuous>    PRN Inpatient Medications  ondansetron Injectable 4 milliGRAM(s) IV Push every 6 hours PRN      REVIEW OF SYSTEMS  --------------------------------------------------------------------------------  Gen: as per HPI  Skin: No rashes  Head/Eyes/Ears/Mouth: No headache;No sore throat  Respiratory: No dyspnea, cough,   CV: No chest pain, PND, orthopnea  GI: as per HPI  : No increased frequency, dysuria, hematuria, nocturia  MSK: No joint pain/swelling; no back pain; no edema  Neuro: No dizziness/lightheadedness, weakness, seizures, numbness, tingling  Psych: No significant nervousness, anxiety, stress, depression    All other systems were reviewed and are negative, except as noted.      LABS/STUDIES  --------------------------------------------------------------------------------              7.2    10.76 >-----------<  262      [01-10-20 @ 09:53]              23.5     141  |  102  |  17  ----------------------------<  102      [01-10-20 @ 07:14]  3.9   |  31  |  0.54        Ca     8.0     [01-10-20 @ 07:14]      iCa    1.09     [01-11 @ 07:48]      Mg     1.9     [01-10-20 @ 07:14]      Phos  4.0     [01-10-20 @ 07:14]    TPro  5.6  /  Alb  2.3  /  TBili  0.6  /  DBili  x   /  AST  15  /  ALT  13  /  AlkPhos  74  [01-10-20 @ 07:14]    PT/INR: PT 13.4 , INR 1.18       [01-10-20 @ 08:20]  PTT: 52.6       [01-10-20 @ 08:20]          PrealbuminPrealbumin, Serum: 13 mg/dL (01-07-20 @ 02:35)  Prealbumin, Serum: 14 mg/dL (01-06-20 @ 07:40)  Prealbumin, Serum: 16 mg/dL (01-01-20 @ 02:48)  Prealbumin, Serum: 15 mg/dL (12-31-19 @ 03:11)  Prealbumin, Serum: 16 mg/dL (12-30-19 @ 07:53)    Triglycerides      01-10-20 @ 07:01  -  01-11-20 @ 07:00  --------------------------------------------------------  IN: 3576.6 mL / OUT: 4095 mL / NET: -518.4 mL        VITALS/PHYSICAL EXAM  --------------------------------------------------------------------------------  T(C): 37.1 (01-11-20 @ 04:56), Max: 37.4 (01-10-20 @ 21:39)  HR: 88 (01-11-20 @ 04:56) (68 - 96)  BP: 116/65 (01-11-20 @ 04:56) (93/57 - 153/73)  RR: 18 (01-11-20 @ 04:56) (13 - 20)  SpO2: 93% (01-11-20 @ 04:56) (93% - 100%)  Wt(kg): --    Physical Exam:    Constitutional: NAD, well-appearing  HEENT: PERRL,  Neck: trach midline  Chest: non labored breathing, equal chest expansion b/l. left sided chest tube in place with serosanguineous drainage   Abd: soft, nontender, nondistended, ostomy pink and viable with stool mustard yellow liquid thin  in bag    Ext: FAROM WWP +1 edema on L UE upper foerarm. RUE +1-2 edema PICC CDI no erythema  b/l Lower extrem +1 edema b/l [facility] NUTRITION SUPPORT--  Attending/ PA FOLLOW UP NOTE      24 hour events/subjective: Pt just returned from Barium swallow study. Pt on TPN since 12/14, and tolerating without issues- denies palpitations, chest pain, shortness of breath. Denies nausea nor vomiting nor abdominal pain. Calorie count initiated, reports eating scrambled eggs and yogurt, eating 1/2 to 3/4 of his meal trays.    Most recently, left pleural effusion s/p Video assisted thorascopic placement of Left Pleurx catheter yesterday- denies CP/SOB/dyspnea/palpitations. Denies F/C/NS.      PAST HISTORY  --------------------------------------------------------------------------------  No significant changes to PMH, PSH, FHx, SHx, unless otherwise noted    ALLERGIES & MEDICATIONS  --------------------------------------------------------------------------------  Allergies    IV Contrast (Hives)    Intolerances      Standing Inpatient Medications  acetaminophen   Tablet .. 975 milliGRAM(s) Oral every 6 hours  albuterol/ipratropium for Nebulization. 3 milliLiter(s) Nebulizer every 6 hours  buDESOnide    Inhalation Suspension 0.5 milliGRAM(s) Inhalation every 12 hours  chlorhexidine 2% Cloths 1 Application(s) Topical <User Schedule>  cholestyramine Powder (Sugar-Free) 4 Gram(s) Oral two times a day  diphenoxylate/atropine 2 Tablet(s) Oral <User Schedule>  enoxaparin Injectable 40 milliGRAM(s) SubCutaneous daily  hydrocortisone 1% Ointment 1 Application(s) Topical daily  insulin lispro (HumaLOG) corrective regimen sliding scale   SubCutaneous three times a day before meals  insulin lispro (HumaLOG) corrective regimen sliding scale   SubCutaneous at bedtime  loperamide 16 milliGRAM(s) Oral <User Schedule>  octreotide  Injectable 100 MICROGram(s) SubCutaneous three times a day  opium Tincture 6 milliGRAM(s) Oral <User Schedule>  pantoprazole  Injectable 40 milliGRAM(s) IV Push daily  Parenteral Nutrition - Adult 1 Each TPN Continuous <Continuous>  sodium chloride 0.9%. 1000 milliLiter(s) IV Continuous <Continuous>    PRN Inpatient Medications  ondansetron Injectable 4 milliGRAM(s) IV Push every 6 hours PRN      REVIEW OF SYSTEMS  --------------------------------------------------------------------------------  Gen: as per HPI  Skin: No rashes  Head/Eyes/Ears/Mouth: No headache;No sore throat  Respiratory: No dyspnea, cough,   CV: No chest pain, PND, orthopnea  GI: as per HPI  : No increased frequency, dysuria, hematuria, nocturia  MSK: No joint pain/swelling; no back pain; no edema  Neuro: No dizziness/lightheadedness, weakness, seizures, numbness, tingling  Psych: No significant nervousness, anxiety, stress, depression    All other systems were reviewed and are negative, except as noted.      LABS/STUDIES  --------------------------------------------------------------------------------              7.2    10.76 >-----------<  262      [01-10-20 @ 09:53]              23.5     141  |  102  |  17  ----------------------------<  102      [01-10-20 @ 07:14]  3.9   |  31  |  0.54        Ca     8.0     [01-10-20 @ 07:14]      iCa    1.09     [01-11 @ 07:48]      Mg     1.9     [01-10-20 @ 07:14]      Phos  4.0     [01-10-20 @ 07:14]    TPro  5.6  /  Alb  2.3  /  TBili  0.6  /  DBili  x   /  AST  15  /  ALT  13  /  AlkPhos  74  [01-10-20 @ 07:14]    PT/INR: PT 13.4 , INR 1.18       [01-10-20 @ 08:20]  PTT: 52.6       [01-10-20 @ 08:20]          PrealbuminPrealbumin, Serum: 13 mg/dL (01-07-20 @ 02:35)  Prealbumin, Serum: 14 mg/dL (01-06-20 @ 07:40)  Prealbumin, Serum: 16 mg/dL (01-01-20 @ 02:48)  Prealbumin, Serum: 15 mg/dL (12-31-19 @ 03:11)  Prealbumin, Serum: 16 mg/dL (12-30-19 @ 07:53)    Triglycerides      01-10-20 @ 07:01  -  01-11-20 @ 07:00  --------------------------------------------------------  IN: 3576.6 mL / OUT: 4095 mL / NET: -518.4 mL        VITALS/PHYSICAL EXAM  --------------------------------------------------------------------------------  T(C): 37.1 (01-11-20 @ 04:56), Max: 37.4 (01-10-20 @ 21:39)  HR: 88 (01-11-20 @ 04:56) (68 - 96)  BP: 116/65 (01-11-20 @ 04:56) (93/57 - 153/73)  RR: 18 (01-11-20 @ 04:56) (13 - 20)  SpO2: 93% (01-11-20 @ 04:56) (93% - 100%)  Wt(kg): --    Physical Exam:    Constitutional: NAD, well-appearing  HEENT: PERRL,  Neck: trach midline  Chest: non labored breathing, equal chest expansion b/l. left sided pleurex catheter with serosanguineous drainage   Abd: soft, nontender, nondistended, ostomy pink and viable with stool mustard yellow liquid thin  in bag    Ext: FAROM WWP b/l UE no edema RUE PICC CDI no erythema  b/l Lower extrem +1 edema b/l

## 2020-01-11 NOTE — SWALLOW VFSS/MBS ASSESSMENT ADULT - SLP GENERAL OBSERVATIONS
Pt encountered in radiology, secure in [facility]ER[facility] chair. Pt awake and alert, verbally responsive and appropriate, following directions for the purposes of the exam.

## 2020-01-11 NOTE — PROGRESS NOTE ADULT - SUBJECTIVE AND OBJECTIVE BOX
Patient is a 75y old  Male who presents with a chief complaint of abd. pain (11 Jan 2020 08:02)      SUBJECTIVE: " I am breathing better this morning "    Vital Signs Last 24 Hrs  T(C): 37.1 (01-11-20 @ 09:47), Max: 37.4 (01-10-20 @ 21:39)  T(F): 98.8 (01-11-20 @ 09:47), Max: 99.3 (01-10-20 @ 21:39)  HR: 82 (01-11-20 @ 09:47) (68 - 96)  BP: 115/67 (01-11-20 @ 09:47) (93/57 - 153/73)  RR: 18 (01-11-20 @ 09:47) (14 - 20)  SpO2: 94% (01-11-20 @ 09:47) (93% - 100%)                01-10-20 @ 07:01  -  01-11-20 @ 07:00  --------------------------------------------------------  IN: 3576.6 mL / OUT: 4095 mL / NET: -518.4 mL        Daily     Daily                           7.1    12.69 )-----------( 291      ( 11 Jan 2020 10:37 )             23.4     01-11    133<L>  |  99  |  22  ----------------------------<  114<H>  3.8   |  30  |  0.50    Ca    7.6<L>      11 Jan 2020 07:15  Phos  3.9     01-11  Mg     1.8     01-11    TPro  5.4<L>  /  Alb  2.0<L>  /  TBili  0.6  /  DBili  x   /  AST  15  /  ALT  13  /  AlkPhos  70  01-11            PHYSICAL EXAM  Neurology: A&Ox3, NAD, no gross deficits  CV : RRR+S1S2  Lungs: Respirations non-labored, B/L BS diminished at bases  Abdomen: Soft, NT/ND, +BSx4Q, ileostomy   Extremities: B/L LE edema, negative calf tenderness, +PP    R PICC       CHEST TUBES:  L pleurex catheter to drain - to low wall suction   dark sanguinous drainage 150 ml overnight 400 in 24 hrs  no air leak            MEDICATIONS  acetaminophen   Tablet .. 975 milliGRAM(s) Oral every 6 hours  albuterol/ipratropium for Nebulization. 3 milliLiter(s) Nebulizer every 6 hours  buDESOnide    Inhalation Suspension 0.5 milliGRAM(s) Inhalation every 12 hours  chlorhexidine 2% Cloths 1 Application(s) Topical <User Schedule>  cholestyramine Powder (Sugar-Free) 4 Gram(s) Oral two times a day  diphenoxylate/atropine 2 Tablet(s) Oral <User Schedule>  enoxaparin Injectable 40 milliGRAM(s) SubCutaneous daily  fat emulsion (Fish Oil and Plant Based) 20% Infusion 20.8 mL/Hr IV Continuous <Continuous>  hydrocortisone 1% Ointment 1 Application(s) Topical daily  insulin lispro (HumaLOG) corrective regimen sliding scale   SubCutaneous three times a day before meals  insulin lispro (HumaLOG) corrective regimen sliding scale   SubCutaneous at bedtime  loperamide 16 milliGRAM(s) Oral <User Schedule>  octreotide  Injectable 100 MICROGram(s) SubCutaneous three times a day  ondansetron Injectable 4 milliGRAM(s) IV Push every 6 hours PRN  opium Tincture 6 milliGRAM(s) Oral <User Schedule>  pantoprazole  Injectable 40 milliGRAM(s) IV Push daily  Parenteral Nutrition - Adult 1 Each TPN Continuous <Continuous>  Parenteral Nutrition - Adult 1 Each TPN Continuous <Continuous>  sodium chloride 0.9%. 1000 milliLiter(s) IV Continuous <Continuous>

## 2020-01-11 NOTE — SWALLOW VFSS/MBS ASSESSMENT ADULT - ORAL PHASE
trace to mild linguapalatal residue that clears with reswallows/Delayed oral transit time trace to min linguapalatal residue post swallow that clears with reswallow/Delayed oral transit time uncontrolled spillover to the valleculae prior to swallow trigger; trace to mild linguapalatal residue that clears with reswallow mild oral residue that clears with reswallows and/or liquid wash/Delayed oral transit time mild oral residue that appears to clear with repeat swallows and/or liquid wash/Delayed oral transit time mild oral residue/Delayed oral transit time

## 2020-01-11 NOTE — SWALLOW VFSS/MBS ASSESSMENT ADULT - RECOMMENDED CONSISTENCY
Mechanical soft diet with thin liquids via SMALL SINGLE SIPS  MD/team Please enter the following as notes to RN: 1) Full supervision with meals, 2) Crush meds or provide via alternate source, as needed, 3) Small single bites and sips at slow rate, 4) Encourage successive swallows for oral and pharyngeal clearance, 5) Alternate food and liquids to aid in oral and pharyngeal clearance, 6) Aspiration precautions. Monitor for s/s aspiration/laryngeal penetration. If noted:  D/C p.o. intake, provide non-oral nutrition/hydration/meds

## 2020-01-11 NOTE — SWALLOW VFSS/MBS ASSESSMENT ADULT - RESIDUE IN VALLECULAE
Moderate/Mild Moderate Moderate/Mild/flows to pyriform sinuses post swallow Yes Mild/Trace Severe/Moderate pharyngeal residue appears to clear with repeat swallows and/or liquid wash

## 2020-01-11 NOTE — SWALLOW VFSS/MBS ASSESSMENT ADULT - RESIDUE OF POSTERIOR PHARYNGEAL WALL
Trace Trace/Mild suspected stranding between superior laryngeal surface of epiglottis and posterior pharyngeal wall/Trace Severe/Moderate

## 2020-01-11 NOTE — PROGRESS NOTE ADULT - SUBJECTIVE AND OBJECTIVE BOX
GENERAL SURGERY PROGRESS NOTE    SUBJECTIVE  Patient seen and examined. No acute events overnight. Reports tolerating diet without nausea, vomiting, +/+ ostomy, voiding without issues  Per CTS, keep pleurex catheter to LCWS, reassess 1/12 AM  Denies fever, chills, SOB, chest pain.     notes breathing easier today compared to yesterday    OBJECTIVE    PHYSICAL EXAM  General: Appears well, NAD  CHEST: breathing comfortably, on O2  CV: appears well perfused  Abdomen: soft, nontender, nondistended, no rebound or guarding, ostomy +/+, midline incision healing with no induration or fluctuance  Extremities: Grossly symmetric    T(C): 37.1 (01-11-20 @ 09:47), Max: 37.4 (01-10-20 @ 21:39)  HR: 82 (01-11-20 @ 09:47) (68 - 96)  BP: 115/67 (01-11-20 @ 09:47) (93/57 - 153/73)  RR: 18 (01-11-20 @ 09:47) (14 - 20)  SpO2: 94% (01-11-20 @ 09:47) (93% - 100%)    01-10-20 @ 07:01  -  01-11-20 @ 07:00  --------------------------------------------------------  IN: 3576.6 mL / OUT: 4095 mL / NET: -518.4 mL    01-11-20 @ 07:01 - 01-11-20 @ 13:16  --------------------------------------------------------  IN: 825 mL / OUT: 700 mL / NET: 125 mL        MEDICATIONS  acetaminophen   Tablet .. 975 milliGRAM(s) Oral every 6 hours  albuterol/ipratropium for Nebulization. 3 milliLiter(s) Nebulizer every 6 hours  buDESOnide    Inhalation Suspension 0.5 milliGRAM(s) Inhalation every 12 hours  chlorhexidine 2% Cloths 1 Application(s) Topical <User Schedule>  cholestyramine Powder (Sugar-Free) 4 Gram(s) Oral two times a day  diphenoxylate/atropine 2 Tablet(s) Oral <User Schedule>  enoxaparin Injectable 40 milliGRAM(s) SubCutaneous daily  fat emulsion (Fish Oil and Plant Based) 20% Infusion 20.8 mL/Hr IV Continuous <Continuous>  hydrocortisone 1% Ointment 1 Application(s) Topical daily  insulin lispro (HumaLOG) corrective regimen sliding scale   SubCutaneous three times a day before meals  insulin lispro (HumaLOG) corrective regimen sliding scale   SubCutaneous at bedtime  loperamide 16 milliGRAM(s) Oral <User Schedule>  octreotide  Injectable 100 MICROGram(s) SubCutaneous three times a day  ondansetron Injectable 4 milliGRAM(s) IV Push every 6 hours PRN  opium Tincture 6 milliGRAM(s) Oral <User Schedule>  pantoprazole  Injectable 40 milliGRAM(s) IV Push daily  Parenteral Nutrition - Adult 1 Each TPN Continuous <Continuous>  Parenteral Nutrition - Adult 1 Each TPN Continuous <Continuous>  sodium chloride 0.9%. 1000 milliLiter(s) IV Continuous <Continuous>      LABS                        7.1    12.69 )-----------( 291      ( 11 Jan 2020 10:37 )             23.4     01-11    133<L>  |  99  |  22  ----------------------------<  114<H>  3.8   |  30  |  0.50    Ca    7.6<L>      11 Jan 2020 07:15  Phos  3.9     01-11  Mg     1.8     01-11    TPro  5.4<L>  /  Alb  2.0<L>  /  TBili  0.6  /  DBili  x   /  AST  15  /  ALT  13  /  AlkPhos  70  01-11    PT/INR - ( 10 Isma 2020 08:20 )   PT: 13.4 sec;   INR: 1.18 ratio         PTT - ( 10 Isma 2020 08:20 )  PTT:52.6 sec      RADIOLOGY & ADDITIONAL STUDIES

## 2020-01-11 NOTE — SWALLOW VFSS/MBS ASSESSMENT ADULT - DIAGNOSTIC IMPRESSIONS
Pt presents with an oropharyngeal dysphagia notable for prolonged oral management for soft chewables, uncontrolled A-P spillover of thin liquids, and pharyngeal retention post swallow. Pt with single episode of laryngeal penetration with nectar-thick liquids with trace laryngeal residue noted after consecutive drinks when used as a liquid wash post chewables. No laryngeal penetration visualized with single sips of nectar-thick liquids. And no laryngeal penetration/aspiration noted across other trials. Pt with occasional baseline cough noted in the absence of visualized laryngeal penetration or aspiration, significantly less frequent than at time of last exam. Respiratory rate and effort also appear significantly improved from that noted at time of last exam. Esophageal phase of swallow notable for trace to min residue superior to ? small cricopharyngeal bar, suggest GI to further assess, if clinically indicated.    Disorders: reduced lingual strength/ROM/Rate of motion, reduced tongue to palate contact, reduced BOT to posterior pharyngeal wall contact, reduced hyo-laryngeal elevation/excursion, reduced laryngeal closure, reduced pharyngeal contraction and stripping, reduced supraglottic sensation.

## 2020-01-12 LAB
ALBUMIN SERPL ELPH-MCNC: 2.1 G/DL — LOW (ref 3.3–5)
ALP SERPL-CCNC: 64 U/L — SIGNIFICANT CHANGE UP (ref 40–120)
ALT FLD-CCNC: 13 U/L — SIGNIFICANT CHANGE UP (ref 10–45)
ANION GAP SERPL CALC-SCNC: 7 MMOL/L — SIGNIFICANT CHANGE UP (ref 5–17)
AST SERPL-CCNC: 12 U/L — SIGNIFICANT CHANGE UP (ref 10–40)
BASOPHILS # BLD AUTO: 0 K/UL — SIGNIFICANT CHANGE UP (ref 0–0.2)
BASOPHILS NFR BLD AUTO: 0 % — SIGNIFICANT CHANGE UP (ref 0–2)
BILIRUB SERPL-MCNC: 0.6 MG/DL — SIGNIFICANT CHANGE UP (ref 0.2–1.2)
BLD GP AB SCN SERPL QL: NEGATIVE — SIGNIFICANT CHANGE UP
BUN SERPL-MCNC: 18 MG/DL — SIGNIFICANT CHANGE UP (ref 7–23)
CA-I BLD-SCNC: 1.15 MMOL/L — SIGNIFICANT CHANGE UP (ref 1.12–1.3)
CALCIUM SERPL-MCNC: 7.6 MG/DL — LOW (ref 8.4–10.5)
CHLORIDE SERPL-SCNC: 101 MMOL/L — SIGNIFICANT CHANGE UP (ref 96–108)
CO2 SERPL-SCNC: 30 MMOL/L — SIGNIFICANT CHANGE UP (ref 22–31)
CREAT SERPL-MCNC: 0.48 MG/DL — LOW (ref 0.5–1.3)
EOSINOPHIL # BLD AUTO: 0.73 K/UL — HIGH (ref 0–0.5)
EOSINOPHIL NFR BLD AUTO: 7.1 % — HIGH (ref 0–6)
GLUCOSE BLDC GLUCOMTR-MCNC: 130 MG/DL — HIGH (ref 70–99)
GLUCOSE BLDC GLUCOMTR-MCNC: 140 MG/DL — HIGH (ref 70–99)
GLUCOSE BLDC GLUCOMTR-MCNC: 141 MG/DL — HIGH (ref 70–99)
GLUCOSE BLDC GLUCOMTR-MCNC: 143 MG/DL — HIGH (ref 70–99)
GLUCOSE SERPL-MCNC: 130 MG/DL — HIGH (ref 70–99)
HCT VFR BLD CALC: 20.6 % — CRITICAL LOW (ref 39–50)
HCT VFR BLD CALC: 23 % — LOW (ref 39–50)
HCT VFR BLD CALC: 26 % — LOW (ref 39–50)
HGB BLD-MCNC: 6.5 G/DL — CRITICAL LOW (ref 13–17)
HGB BLD-MCNC: 7 G/DL — CRITICAL LOW (ref 13–17)
HGB BLD-MCNC: 8.2 G/DL — LOW (ref 13–17)
IMM GRANULOCYTES NFR BLD AUTO: 0.7 % — SIGNIFICANT CHANGE UP (ref 0–1.5)
LYMPHOCYTES # BLD AUTO: 1.13 K/UL — SIGNIFICANT CHANGE UP (ref 1–3.3)
LYMPHOCYTES # BLD AUTO: 11 % — LOW (ref 13–44)
MAGNESIUM SERPL-MCNC: 1.7 MG/DL — SIGNIFICANT CHANGE UP (ref 1.6–2.6)
MCHC RBC-ENTMCNC: 29.7 PG — SIGNIFICANT CHANGE UP (ref 27–34)
MCHC RBC-ENTMCNC: 30.3 PG — SIGNIFICANT CHANGE UP (ref 27–34)
MCHC RBC-ENTMCNC: 30.4 GM/DL — LOW (ref 32–36)
MCHC RBC-ENTMCNC: 30.7 PG — SIGNIFICANT CHANGE UP (ref 27–34)
MCHC RBC-ENTMCNC: 31.5 GM/DL — LOW (ref 32–36)
MCHC RBC-ENTMCNC: 31.6 GM/DL — LOW (ref 32–36)
MCV RBC AUTO: 95.9 FL — SIGNIFICANT CHANGE UP (ref 80–100)
MCV RBC AUTO: 97.2 FL — SIGNIFICANT CHANGE UP (ref 80–100)
MCV RBC AUTO: 97.5 FL — SIGNIFICANT CHANGE UP (ref 80–100)
MONOCYTES # BLD AUTO: 0.88 K/UL — SIGNIFICANT CHANGE UP (ref 0–0.9)
MONOCYTES NFR BLD AUTO: 8.6 % — SIGNIFICANT CHANGE UP (ref 2–14)
NEUTROPHILS # BLD AUTO: 7.44 K/UL — HIGH (ref 1.8–7.4)
NEUTROPHILS NFR BLD AUTO: 72.6 % — SIGNIFICANT CHANGE UP (ref 43–77)
NRBC # BLD: 0 /100 WBCS — SIGNIFICANT CHANGE UP (ref 0–0)
NRBC # BLD: 0 /100 WBCS — SIGNIFICANT CHANGE UP (ref 0–0)
PHOSPHATE SERPL-MCNC: 3.4 MG/DL — SIGNIFICANT CHANGE UP (ref 2.5–4.5)
PLATELET # BLD AUTO: 256 K/UL — SIGNIFICANT CHANGE UP (ref 150–400)
PLATELET # BLD AUTO: 285 K/UL — SIGNIFICANT CHANGE UP (ref 150–400)
PLATELET # BLD AUTO: 294 K/UL — SIGNIFICANT CHANGE UP (ref 150–400)
POTASSIUM SERPL-MCNC: 3.6 MMOL/L — SIGNIFICANT CHANGE UP (ref 3.5–5.3)
POTASSIUM SERPL-SCNC: 3.6 MMOL/L — SIGNIFICANT CHANGE UP (ref 3.5–5.3)
PROT SERPL-MCNC: 5.3 G/DL — LOW (ref 6–8.3)
RBC # BLD: 2.12 M/UL — LOW (ref 4.2–5.8)
RBC # BLD: 2.36 M/UL — LOW (ref 4.2–5.8)
RBC # BLD: 2.71 M/UL — LOW (ref 4.2–5.8)
RBC # FLD: 15.4 % — HIGH (ref 10.3–14.5)
RH IG SCN BLD-IMP: POSITIVE — SIGNIFICANT CHANGE UP
SODIUM SERPL-SCNC: 138 MMOL/L — SIGNIFICANT CHANGE UP (ref 135–145)
WBC # BLD: 10.25 K/UL — SIGNIFICANT CHANGE UP (ref 3.8–10.5)
WBC # BLD: 13.87 K/UL — HIGH (ref 3.8–10.5)
WBC # BLD: 15.75 K/UL — HIGH (ref 3.8–10.5)
WBC # FLD AUTO: 10.25 K/UL — SIGNIFICANT CHANGE UP (ref 3.8–10.5)
WBC # FLD AUTO: 13.87 K/UL — HIGH (ref 3.8–10.5)
WBC # FLD AUTO: 15.75 K/UL — HIGH (ref 3.8–10.5)

## 2020-01-12 PROCEDURE — 99232 SBSQ HOSP IP/OBS MODERATE 35: CPT

## 2020-01-12 PROCEDURE — 71045 X-RAY EXAM CHEST 1 VIEW: CPT | Mod: 26

## 2020-01-12 RX ORDER — ELECTROLYTE SOLUTION,INJ
1 VIAL (ML) INTRAVENOUS
Refills: 0 | Status: DISCONTINUED | OUTPATIENT
Start: 2020-01-12 | End: 2020-01-13

## 2020-01-12 RX ORDER — I.V. FAT EMULSION 20 G/100ML
20.8 EMULSION INTRAVENOUS
Qty: 50 | Refills: 0 | Status: DISCONTINUED | OUTPATIENT
Start: 2020-01-12 | End: 2020-01-13

## 2020-01-12 RX ADMIN — Medication 975 MILLIGRAM(S): at 06:37

## 2020-01-12 RX ADMIN — ENOXAPARIN SODIUM 40 MILLIGRAM(S): 100 INJECTION SUBCUTANEOUS at 12:42

## 2020-01-12 RX ADMIN — OCTREOTIDE ACETATE 100 MICROGRAM(S): 200 INJECTION, SOLUTION INTRAVENOUS; SUBCUTANEOUS at 06:06

## 2020-01-12 RX ADMIN — Medication 3 MILLILITER(S): at 06:07

## 2020-01-12 RX ADMIN — Medication 3 MILLILITER(S): at 12:52

## 2020-01-12 RX ADMIN — Medication 0.5 MILLIGRAM(S): at 06:06

## 2020-01-12 RX ADMIN — OCTREOTIDE ACETATE 100 MICROGRAM(S): 200 INJECTION, SOLUTION INTRAVENOUS; SUBCUTANEOUS at 16:07

## 2020-01-12 RX ADMIN — Medication 975 MILLIGRAM(S): at 23:11

## 2020-01-12 RX ADMIN — Medication 0.5 MILLIGRAM(S): at 18:54

## 2020-01-12 RX ADMIN — Medication 975 MILLIGRAM(S): at 19:28

## 2020-01-12 RX ADMIN — Medication 2 TABLET(S): at 12:52

## 2020-01-12 RX ADMIN — CHOLESTYRAMINE 4 GRAM(S): 4 POWDER, FOR SUSPENSION ORAL at 22:38

## 2020-01-12 RX ADMIN — Medication 975 MILLIGRAM(S): at 00:17

## 2020-01-12 RX ADMIN — I.V. FAT EMULSION 20.8 ML/HR: 20 EMULSION INTRAVENOUS at 17:21

## 2020-01-12 RX ADMIN — Medication 3 MILLILITER(S): at 18:54

## 2020-01-12 RX ADMIN — Medication 975 MILLIGRAM(S): at 06:07

## 2020-01-12 RX ADMIN — Medication 1 EACH: at 17:21

## 2020-01-12 RX ADMIN — Medication 16 MILLIGRAM(S): at 18:57

## 2020-01-12 RX ADMIN — CHOLESTYRAMINE 4 GRAM(S): 4 POWDER, FOR SUSPENSION ORAL at 12:42

## 2020-01-12 RX ADMIN — MORPHINE 6 MILLIGRAM(S): 10 SOLUTION ORAL at 17:45

## 2020-01-12 RX ADMIN — PANTOPRAZOLE SODIUM 40 MILLIGRAM(S): 20 TABLET, DELAYED RELEASE ORAL at 06:07

## 2020-01-12 RX ADMIN — Medication 975 MILLIGRAM(S): at 13:10

## 2020-01-12 RX ADMIN — MORPHINE 6 MILLIGRAM(S): 10 SOLUTION ORAL at 12:41

## 2020-01-12 RX ADMIN — Medication 2 TABLET(S): at 17:46

## 2020-01-12 RX ADMIN — MORPHINE 6 MILLIGRAM(S): 10 SOLUTION ORAL at 23:12

## 2020-01-12 RX ADMIN — Medication 975 MILLIGRAM(S): at 18:58

## 2020-01-12 RX ADMIN — Medication 2 TABLET(S): at 23:12

## 2020-01-12 RX ADMIN — Medication 975 MILLIGRAM(S): at 12:43

## 2020-01-12 RX ADMIN — OCTREOTIDE ACETATE 100 MICROGRAM(S): 200 INJECTION, SOLUTION INTRAVENOUS; SUBCUTANEOUS at 23:12

## 2020-01-12 RX ADMIN — Medication 3 MILLILITER(S): at 23:12

## 2020-01-12 RX ADMIN — Medication 16 MILLIGRAM(S): at 23:12

## 2020-01-12 RX ADMIN — Medication 16 MILLIGRAM(S): at 12:44

## 2020-01-12 NOTE — PROGRESS NOTE ADULT - ASSESSMENT
75 y/o M presenting with septic shock and high grade SBO s/p exploratory laparotomy, lysis of adhesions, decompression of bowel via enterotomy w/ primary repair, and Abthera VAC placement on 12/6; s/p take down of Abthera, washout and re-application of the Abthera vac on 12/8. Now s/p SBR and end ileostomy on 12/10 acute respiratory distress now improving, Pneumothorax, hyperglycemia, delirium. Now still with persistent pneumothorax when chest pigtail clamped.   12/23 VSS on room air recommending IR drainage of left chest  12/24 No evidence of air leak to left chest tube. Tube clamped. Repeat chest cxr in 4 hours. Anticipate chest tube removal if lung remains expanded. Drainage of loculated left effusion discussed with IR. IR to reconsult for drainage once initial tube removed. Discussed plan with Dr Mayes and IR Fellow  12/24  On NC ,    VSSchest xray sm lt pl eff, left chest tube dc'd.  12/26    2l NC   co  sob,  lt side diminshed  12/27 VSS, CXR with unchanged loculated left pleural effusion- IR consulted for pigtail placement, states effusion can be drained via thoracentesis, pigtail placement not indicated at this time - Dr. Mayes to speak with IR attending.   12/30 Patient with persistent PTX  and left pleural effusion now for IR drainage with Dr Elkins.   12/31 HD stable, L PTC w/ high output, serosanguinous drainage, 990 ml overnight/ 2200 in 24 hrs. Tolerating 2L NC supplemental O2, Cyto pending, continue monitor drainage output.   1/1: LPTC still with High output-400/24h. Continue with pigtail cath drain.  1/ 2     lt pigtail draining  on lws  1/3 HD stable, left pigtail catheter continues to drain. Incentive spirometer encouraged, OOB and mobility, continue pulmonary toileting. Maintain chest tube to suction and monitor output.   1/4: Chest tube milked to prevent clogging of tube, Continues to drain with high out put.  1/5 remains with high out put 170/700. Continue drainage  1/6  persistent drainage from lt pl tube  1/7 Left pigtail 150/450. Daily CXR. Planning for pleurex cath placement on Friday 1/10 w/ Dr. Mayes  1/8        Lt pleural tube   persistent draining    1/9 preop for pleurX placement tomorrow. CT clamped. NPO after MN, type and cross x2.   1/10 s/p VATS L pleurx cath placement   1/11 HD stable, no resp distress, maintain L pleurex to suction overnight, CXR in AM.   1/12 pleurex waterseal

## 2020-01-12 NOTE — PROGRESS NOTE ADULT - SUBJECTIVE AND OBJECTIVE BOX
[facility] NUTRITION SUPPORT--  Attending/ PA FOLLOW UP NOTE      24 hour events/subjective: Denies Palpitations, chest pain, shortness of breath. Denies nausea nor vomiting nor abdominal pain.        PAST HISTORY  --------------------------------------------------------------------------------  No significant changes to PMH, PSH, FHx, SHx, unless otherwise noted    ALLERGIES & MEDICATIONS  --------------------------------------------------------------------------------  Allergies    IV Contrast (Hives)    Intolerances      Standing Inpatient Medications  acetaminophen   Tablet .. 975 milliGRAM(s) Oral every 6 hours  albuterol/ipratropium for Nebulization. 3 milliLiter(s) Nebulizer every 6 hours  buDESOnide    Inhalation Suspension 0.5 milliGRAM(s) Inhalation every 12 hours  chlorhexidine 2% Cloths 1 Application(s) Topical <User Schedule>  cholestyramine Powder (Sugar-Free) 4 Gram(s) Oral two times a day  diphenoxylate/atropine 2 Tablet(s) Oral <User Schedule>  enoxaparin Injectable 40 milliGRAM(s) SubCutaneous daily  fat emulsion (Fish Oil and Plant Based) 20% Infusion 20.8 mL/Hr IV Continuous <Continuous>  insulin lispro (HumaLOG) corrective regimen sliding scale   SubCutaneous three times a day before meals  insulin lispro (HumaLOG) corrective regimen sliding scale   SubCutaneous at bedtime  loperamide 16 milliGRAM(s) Oral <User Schedule>  octreotide  Injectable 100 MICROGram(s) SubCutaneous three times a day  opium Tincture 6 milliGRAM(s) Oral <User Schedule>  pantoprazole    Tablet 40 milliGRAM(s) Oral before breakfast  Parenteral Nutrition - Adult 1 Each TPN Continuous <Continuous>  sodium chloride 0.9%. 1000 milliLiter(s) IV Continuous <Continuous>    PRN Inpatient Medications  ondansetron Injectable 4 milliGRAM(s) IV Push every 6 hours PRN      REVIEW OF SYSTEMS  --------------------------------------------------------------------------------  Gen: as per HPI  Skin: No rashes  Head/Eyes/Ears/Mouth: No headache;No sore throat  Respiratory: No dyspnea, cough,   CV: No chest pain, PND, orthopnea  GI: as per HPI  : No increased frequency, dysuria, hematuria, nocturia  MSK: No joint pain/swelling; no back pain; no edema  Neuro: No dizziness/lightheadedness, weakness, seizures, numbness, tingling  Psych: No significant nervousness, anxiety, stress, depression    All other systems were reviewed and are negative, except as noted.      LABS/STUDIES  --------------------------------------------------------------------------------              7.1    12.69 >-----------<  291      [01-11-20 @ 10:37]              23.4     138  |  101  |  18  ----------------------------<  130      [01-12-20 @ 06:49]  3.6   |  30  |  0.48        Ca     7.6     [01-12-20 @ 06:49]      iCa    1.15     [01-12 @ 07:19]      Mg     1.7     [01-12-20 @ 06:49]      Phos  3.4     [01-12-20 @ 06:49]    TPro  5.3  /  Alb  2.1  /  TBili  0.6  /  DBili  x   /  AST  12  /  ALT  13  /  AlkPhos  64  [01-12-20 @ 06:49]            Glucose, Serum: 130 mg/dL (01-12-20 @ 06:49)    PrealbuminPrealbumin, Serum: 13 mg/dL (01-07-20 @ 02:35)  Prealbumin, Serum: 14 mg/dL (01-06-20 @ 07:40)  Prealbumin, Serum: 16 mg/dL (01-01-20 @ 02:48)  Prealbumin, Serum: 15 mg/dL (12-31-19 @ 03:11)  Prealbumin, Serum: 16 mg/dL (12-30-19 @ 07:53)    Triglycerides      01-11-20 @ 07:01  -  01-12-20 @ 07:00  --------------------------------------------------------  IN: 5494.6 mL / OUT: 3900 mL / NET: 1594.6 mL        VITALS/PHYSICAL EXAM  --------------------------------------------------------------------------------  T(C): 36.9 (01-12-20 @ 06:36), Max: 37.2 (01-12-20 @ 01:33)  HR: 84 (01-12-20 @ 06:36) (81 - 89)  BP: 106/64 (01-12-20 @ 06:36) (105/60 - 115/67)  RR: 18 (01-12-20 @ 06:36) (18 - 18)  SpO2: 95% (01-12-20 @ 06:36) (94% - 97%)  Wt(kg): --      Physical Exam:    Constitutional: NAD, well-appearing  HEENT: PERRL,  Neck: trach midline  Chest: non labored breathing, equal chest expansion b/l. left sided pleurex catheter with serosanguineous drainage   Abd: soft, nontender, nondistended, ostomy pink and viable with stool mustard yellow liquid thin  in bag    Ext: FAROM WWP b/l UE no edema RUE PICC CDI no erythema  b/l Lower extrem +1 edema b/l      .  .  . [facility] NUTRITION SUPPORT--  Attending/ PA FOLLOW UP NOTE      24 hour events/subjective: Pt on TPN since 12/14 and denies palpitations, chest pain, shortness of breath. Denies nausea nor vomiting nor abdominal pain. Barium swallow performed yesterday. Reports tolerating his soft mechanical diet and eating 1/2 to 3/4 of his meal trays.  conites t have signicant ostomy output.  Denies any pain at Pleurex catheter is in his Left upper chest area SOI.         PAST HISTORY  --------------------------------------------------------------------------------  No significant changes to PMH, PSH, FHx, SHx, unless otherwise noted    ALLERGIES & MEDICATIONS  --------------------------------------------------------------------------------  Allergies    IV Contrast (Hives)    Intolerances      Standing Inpatient Medications  acetaminophen   Tablet .. 975 milliGRAM(s) Oral every 6 hours  albuterol/ipratropium for Nebulization. 3 milliLiter(s) Nebulizer every 6 hours  buDESOnide    Inhalation Suspension 0.5 milliGRAM(s) Inhalation every 12 hours  chlorhexidine 2% Cloths 1 Application(s) Topical <User Schedule>  cholestyramine Powder (Sugar-Free) 4 Gram(s) Oral two times a day  diphenoxylate/atropine 2 Tablet(s) Oral <User Schedule>  enoxaparin Injectable 40 milliGRAM(s) SubCutaneous daily  fat emulsion (Fish Oil and Plant Based) 20% Infusion 20.8 mL/Hr IV Continuous <Continuous>  insulin lispro (HumaLOG) corrective regimen sliding scale   SubCutaneous three times a day before meals  insulin lispro (HumaLOG) corrective regimen sliding scale   SubCutaneous at bedtime  loperamide 16 milliGRAM(s) Oral <User Schedule>  octreotide  Injectable 100 MICROGram(s) SubCutaneous three times a day  opium Tincture 6 milliGRAM(s) Oral <User Schedule>  pantoprazole    Tablet 40 milliGRAM(s) Oral before breakfast  Parenteral Nutrition - Adult 1 Each TPN Continuous <Continuous>  sodium chloride 0.9%. 1000 milliLiter(s) IV Continuous <Continuous>    PRN Inpatient Medications  ondansetron Injectable 4 milliGRAM(s) IV Push every 6 hours PRN      REVIEW OF SYSTEMS  --------------------------------------------------------------------------------  Gen: as per HPI  Skin: No rashes  Head/Eyes/Ears/Mouth: No headache;No sore throat  Respiratory: No dyspnea, cough,   CV: No chest pain, PND, orthopnea  GI: as per HPI  : No increased frequency, dysuria, hematuria, nocturia  MSK: No joint pain/swelling; no back pain; no edema  Neuro: No dizziness/lightheadedness, weakness, seizures, numbness, tingling  Psych: No significant nervousness, anxiety, stress, depression    All other systems were reviewed and are negative, except as noted.      LABS/STUDIES  --------------------------------------------------------------------------------              7.1    12.69 >-----------<  291      [01-11-20 @ 10:37]              23.4     138  |  101  |  18  ----------------------------<  130      [01-12-20 @ 06:49]  3.6   |  30  |  0.48        Ca     7.6     [01-12-20 @ 06:49]      iCa    1.15     [01-12 @ 07:19]      Mg     1.7     [01-12-20 @ 06:49]      Phos  3.4     [01-12-20 @ 06:49]    TPro  5.3  /  Alb  2.1  /  TBili  0.6  /  DBili  x   /  AST  12  /  ALT  13  /  AlkPhos  64  [01-12-20 @ 06:49]            Glucose, Serum: 130 mg/dL (01-12-20 @ 06:49)    PrealbuminPrealbumin, Serum: 13 mg/dL (01-07-20 @ 02:35)  Prealbumin, Serum: 14 mg/dL (01-06-20 @ 07:40)  Prealbumin, Serum: 16 mg/dL (01-01-20 @ 02:48)  Prealbumin, Serum: 15 mg/dL (12-31-19 @ 03:11)  Prealbumin, Serum: 16 mg/dL (12-30-19 @ 07:53)    Triglycerides      01-11-20 @ 07:01  -  01-12-20 @ 07:00  --------------------------------------------------------  IN: 5494.6 mL / OUT: 3900 mL / NET: 1594.6 mL        VITALS/PHYSICAL EXAM  --------------------------------------------------------------------------------  T(C): 36.9 (01-12-20 @ 06:36), Max: 37.2 (01-12-20 @ 01:33)  HR: 84 (01-12-20 @ 06:36) (81 - 89)  BP: 106/64 (01-12-20 @ 06:36) (105/60 - 115/67)  RR: 18 (01-12-20 @ 06:36) (18 - 18)  SpO2: 95% (01-12-20 @ 06:36) (94% - 97%)  Wt(kg): --      Physical Exam:    Constitutional: NAD, well-appearing  HEENT: PERRL,  Neck: trach midline  Chest: non labored breathing, equal chest expansion b/l. Left sided pleurex catheter with serosanguinous drainage   Abd: soft, nontender, nondistended, ostomy pink and viable with stool mustard yellow liquid thin  in bag    Ext: FAROM WWP b/l UE no edema RUE PICC CDI no erythema  b/l Lower extrem +1 edema b/l      .  .  .

## 2020-01-12 NOTE — PROGRESS NOTE ADULT - ASSESSMENT
74 year old male w/ PMH of COPD and EtOH dependence with PSH/o appy, prostate surgery, & spine surgery  septic shock and high grade SBO s/p exploratory laparotomy, lysis of adhesions, decompression of bowel via enterotomy w/ primary repair, and Abthera VAC placement on 12/6; s/p take down of Abthera, washout and re-application of the Abthera vac on 12/8. Now s/p SBR and end ileostomy on 12/10.   TPN consulted to assist w/ management of pt's nutrition in pt w/ prolonged hospital course now tolerating diet but has high Ileostomy output.  Pt s/p Lt Chest Pigtail for effusion in IR with persistent high output. Pt is s/p Video assisted thorascopic placement of Left PleurX catheter 1/10.    -Continue TPN at full strength for Severe Protein-Calorie Malnutrition in setting of high ostomy output. Pt tolerating Dysphagia 1 Pureed-Nectar Consistency Fluid w/ Protein Supplements  - Strict Intake and Output-  Surgery monitoring and repleting 1:1  w/ IVF. Continue  TPN & octreotide, lomotil, tincture of opium, & imodium  - f/u Barium Swallow study results  - h/o HypoNa  improved, continue to monitor  - h/o HypoK and  HyperK  improving, will maintain KCl of 40 mEq KCl in TPN  - h/o HypoPhos improved, continue NaPhos of 45mMol   - Short Gut Syndrome Fecal fat testing- 72hr fecal studies- pending, electrolytes & fat content to be assessed -see what pt is absorbing  - Hyperglycemia - continue Fingersticks & ISS coverage.  - PICC w/dedicated port for only TPN - maintenance as per protocol  - Weights three times a week  - Monitor BMP, Mg, Ionized Ca, Phosphorus daily.  - Continue to monitor Triglycerides and Pre-albumin weekly- due 1/13  - Continue as per Surgery, will follow with you, D/w primary team      TPN team, pager [phone]  D/w Ana M Siegel 74 year old male w/ PMH of COPD and EtOH dependence with PSH/o appy, prostate surgery, & spine surgery  septic shock and high grade SBO s/p exploratory laparotomy, lysis of adhesions, decompression of bowel via enterotomy w/ primary repair, and Abthera VAC placement on 12/6; s/p take down of Abthera, washout and re-application of the Abthera vac on 12/8. Now s/p SBR and end ileostomy on 12/10.   TPN consulted to assist w/ management of pt's nutrition in pt w/ prolonged hospital course now tolerating diet but has high Ileostomy output.  Pt s/p Lt Chest Pigtail for effusion in IR with persistent high output. Pt is s/p Video assisted thorascopic placement of Left PleurX catheter 1/10.    -Continue TPN at full strength for Severe Protein-Calorie Malnutrition in setting of high ostomy output. Pt tolerating Dysphagia 1 Pureed-Nectar Consistency Fluid w/ Protein Supplements. Consider compression for discharge and dspo planning.  - Strict Intake and Output-  Surgery monitoring and repleting 1:1  w/ IVF. Continue  TPN & octreotide, lomotil, tincture of opium, & imodium  - f/u Barium Swallow study results  - h/o HypoNa  improved, continue to monitor  - h/o HypoK and  HyperK  improving, will maintain KCl of 40 mEq KCl in TPN  - h/o HypoPhos improved, continue NaPhos of 45mMol   - Short Gut Syndrome Fecal fat testing- 72hr fecal studies- pending, electrolytes & fat content to be assessed -see what pt is absorbing  - Hyperglycemia - continue Fingersticks & ISS coverage.  - PICC w/dedicated port for only TPN - maintenance as per protocol  - Weights three times a week  - Monitor BMP, Mg, Ionized Ca, Phosphorus daily.  - Continue to monitor Triglycerides and Pre-albumin weekly- due 1/13  - Continue as per Surgery, will follow with you, D/w primary team      TPN team, pager [phone]  D/w Ana M Chacko and MU Siegel 74 year old male w/ PMH of COPD and EtOH dependence with PSH/o appy, prostate surgery, & spine surgery  septic shock and high grade SBO s/p exploratory laparotomy, lysis of adhesions, decompression of bowel via enterotomy w/ primary repair, and Abthera VAC placement on 12/6; s/p take down of Abthera, washout and re-application of the Abthera vac on 12/8. Now s/p SBR and end ileostomy on 12/10.   TPN consulted to assist w/ management of pt's nutrition in pt w/ prolonged hospital course now tolerating diet but has high Ileostomy output.  Pt s/p Lt Chest Pigtail for effusion in IR with persistent high output. Pt is s/p Video assisted thorascopic placement of Left PleurX catheter 1/10.    -Continue TPN at full strength for Severe Protein-Calorie Malnutrition in setting of high ostomy output. Pt tolerating Dysphagia 1 Pureed-Nectar Consistency Fluid w/ Protein Supplements. Consider compression for discharge and dspo planning. If dc'd without TPN can possibly give bicitrate and NaCl Tabs  - Strict Intake and Output-  Surgery monitoring and repleting 1:1  w/ IVF. Continue  TPN & octreotide, lomotil, tincture of opium, & imodium  - f/u Barium Swallow study results  - h/o HypoNa  improved, continue to monitor  - h/o HypoK and  HyperK  improving, will maintain KCl of 40 mEq KCl in TPN  - h/o HypoPhos improved, continue NaPhos of 45mMol   - Short Gut Syndrome Fecal fat testing- 72hr fecal studies- pending, electrolytes & fat content to be assessed -see what pt is absorbing  - Hyperglycemia - continue Fingersticks & ISS coverage.  - PICC w/dedicated port for only TPN - maintenance as per protocol  - Weights three times a week  - Monitor BMP, Mg, Ionized Ca, Phosphorus daily.  - Continue to monitor Triglycerides and Pre-albumin weekly- due 1/13  - Continue as per Surgery, will follow with you, D/w primary team      TPN team, pager [phone]  D/w Ana M Chacko and MU Siegel

## 2020-01-12 NOTE — PROGRESS NOTE ADULT - SUBJECTIVE AND OBJECTIVE BOX
Subjective:  "I am ok today, I have a hard time getting comfortable sometimes"  Sitting upright in bed this am      V/S  T(C): 36.7 (01-12-20 @ 12:29), Max: 37.2 (01-12-20 @ 01:33)  HR: 89 (01-12-20 @ 12:29) (81 - 89)  BP: 123/61 (01-12-20 @ 12:29) (103/66 - 123/61)  RR: 18 (01-12-20 @ 12:29) (18 - 18)  SpO2: 98% (01-12-20 @ 12:29) (95% - 98%)    MEDICATIONS  (STANDING):  acetaminophen   Tablet .. 975 milliGRAM(s) Oral every 6 hours  albuterol/ipratropium for Nebulization. 3 milliLiter(s) Nebulizer every 6 hours  buDESOnide    Inhalation Suspension 0.5 milliGRAM(s) Inhalation every 12 hours  chlorhexidine 2% Cloths 1 Application(s) Topical <User Schedule>  cholestyramine Powder (Sugar-Free) 4 Gram(s) Oral two times a day  diphenoxylate/atropine 2 Tablet(s) Oral <User Schedule>  enoxaparin Injectable 40 milliGRAM(s) SubCutaneous daily  fat emulsion (Fish Oil and Plant Based) 20% Infusion 20.8 mL/Hr (20.8 mL/Hr) IV Continuous <Continuous>  insulin lispro (HumaLOG) corrective regimen sliding scale   SubCutaneous three times a day before meals  insulin lispro (HumaLOG) corrective regimen sliding scale   SubCutaneous at bedtime  loperamide 16 milliGRAM(s) Oral <User Schedule>  octreotide  Injectable 100 MICROGram(s) SubCutaneous three times a day  opium Tincture 6 milliGRAM(s) Oral <User Schedule>  pantoprazole    Tablet 40 milliGRAM(s) Oral before breakfast  Parenteral Nutrition - Adult 1 Each (75 mL/Hr) TPN Continuous <Continuous>  Parenteral Nutrition - Adult 1 Each (75 mL/Hr) TPN Continuous <Continuous>  sodium chloride 0.9%. 1000 milliLiter(s) (10 mL/Hr) IV Continuous <Continuous>      01-12    138  |  101  |  18  ----------------------------<  130<H>  3.6   |  30  |  0.48<L>    Ca    7.6<L>      12 Jan 2020 06:49  Phos  3.4     01-12  Mg     1.7     01-12    TPro  5.3<L>  /  Alb  2.1<L>  /  TBili  0.6  /  DBili  x   /  AST  12  /  ALT  13  /  AlkPhos  64  01-12                               7.0    13.87 )-----------( 294      ( 12 Jan 2020 11:23 )             23.0                 CAPILLARY BLOOD GLUCOSE      POCT Blood Glucose.: 143 mg/dL (12 Jan 2020 12:09)  POCT Blood Glucose.: 139 mg/dL (11 Jan 2020 22:38)  POCT Blood Glucose.: 115 mg/dL (11 Jan 2020 18:18)  POCT Blood Glucose.: 138 mg/dL (11 Jan 2020 14:38)           CXR:< from: Xray Chest 1 View- PORTABLE-Routine (01.12.20 @ 07:00) >  Right PICC line is unchanged. Left pleural catheters are partially imaged. Small pleural effusions likely with associated areas of atelectasis. No clear evidence of a pneumothorax given left superimposed subcutaneous emphysema.    < end of copied text >        Physical Exam:    Neurology: A&Ox3, NAD,   CV : RRR+S1S2  Lungs: Respirations non-labored, B/L BS diminished at bases Supplemental O2 in use  Ant subclavicular crepitus noted, Good air expansion,   Abdomen: Soft, NT/ND, +BSx4Q, ileostomy   Extremities: B/L LE edema, negative calf tenderness, +PP    R PICC       CHEST TUBES:  L pleurex catheter to drain - to low wall suction > placed waterseal this am as per Dr Lang  dark sanguinous drainage 50 ml overnight   no air leak          PAST MEDICAL & SURGICAL HISTORY:  COPD with hypoxia  ETOHism  ETOH abuse  History of lumbosacral spine surgery  History of prostate surgery  History of appendectomy

## 2020-01-12 NOTE — PROGRESS NOTE ADULT - ASSESSMENT
76 y/o M presenting with septic shock and high grade SBO s/p exploratory laparotomy, lysis of adhesions, decompression of bowel via enterotomy w/ primary repair, and Abthera VAC placement on 12/6; s/p take down of Abthera, washout and re-application of the Abthera vac on 12/8. 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 s/p L chest tube placement by IR 12/30 for pleural effusion now s/p VATS, with chest tube insertion for drainage of pleural effusion    Plan   - pain control   - continue dysphagia diet   - MBS 1/11, per s/s: Mechanical soft diet with thin liquids via SMALL SINGLE SIPS  MD/team Please enter the following as notes to RN: 1) Full supervision with meals, 2) Crush meds or provide via alternate source, as needed, 3) Small single bites and sips at slow rate, 4) Encourage successive swallows for oral and pharyngeal clearance, 5) Alternate food and liquids to aid in oral and pharyngeal clearance, 6) Aspiration precautions. Monitor for s/s aspiration/laryngeal penetration. If noted:  D/C p.o. intake, provide non-oral nutrition/hydration/meds  - F/U am CXR  - OOB as tolerated with PT  - monitor high chest tube and ileostomy output.  - cont 0.5:1 repletions  - cont TPN  - F/U Derm consult regarding back rash 1/10: no creams needed, "If no improvement in several days or rash becomes symptomatic, ok to apply triamcinolone 0.1% cream BID to affected area PRN for itch"  - per CTS, CT found on water seal this AM 1/12    RED SURGERY  [id number]

## 2020-01-12 NOTE — PROGRESS NOTE ADULT - PROBLEM SELECTOR PLAN 1
sp  IR placed lt pigtail  s/p VATS, L pleurex cath placement 1/10  placed to waterseal  will cap pleurX when appropriate  daily chest xray while chest tube in place  STAT CXR if resp distress or condition changes  continue monitor chest tube output, strict Is and Os  continue pulm toilet  OOB to bedside chair

## 2020-01-12 NOTE — PROGRESS NOTE ADULT - PROBLEM SELECTOR PLAN 1
s/p ex lap, MIRYAM, closure of enterotomy, abthera vac placement  s/p washout  s/p PICC line placement . On TPN    Stable  Advance diet as tolerated

## 2020-01-12 NOTE — PROGRESS NOTE ADULT - SUBJECTIVE AND OBJECTIVE BOX
GENERAL SURGERY PROGRESS NOTE    SUBJECTIVE  Patient seen and examined. No acute events overnight. Reports tolerating diet without nausea, vomiting, 2675cc ileostomy output/24hours   voiding without issues, has been out of bed. Denies fever, chills, SOB, chest pain.         OBJECTIVE    PHYSICAL EXAM  General: Appears well, NAD  CHEST: breathing comfortably  CV: appears well perfused  Abdomen: soft, nontender, nondistended, no rebound or guarding  Extremities: Grossly symmetric    T(C): 36.9 (01-12-20 @ 06:36), Max: 37.2 (01-12-20 @ 01:33)  HR: 84 (01-12-20 @ 06:36) (81 - 89)  BP: 106/64 (01-12-20 @ 06:36) (105/60 - 115/66)  RR: 18 (01-12-20 @ 06:36) (18 - 18)  SpO2: 95% (01-12-20 @ 06:36) (95% - 97%)    01-11-20 @ 07:01  -  01-12-20 @ 07:00  --------------------------------------------------------  IN: 5494.6 mL / OUT: 3900 mL / NET: 1594.6 mL    01-12-20 @ 07:01  -  01-12-20 @ 10:57  --------------------------------------------------------  IN: 0 mL / OUT: 175 mL / NET: -175 mL        MEDICATIONS  acetaminophen   Tablet .. 975 milliGRAM(s) Oral every 6 hours  albuterol/ipratropium for Nebulization. 3 milliLiter(s) Nebulizer every 6 hours  buDESOnide    Inhalation Suspension 0.5 milliGRAM(s) Inhalation every 12 hours  chlorhexidine 2% Cloths 1 Application(s) Topical <User Schedule>  cholestyramine Powder (Sugar-Free) 4 Gram(s) Oral two times a day  diphenoxylate/atropine 2 Tablet(s) Oral <User Schedule>  enoxaparin Injectable 40 milliGRAM(s) SubCutaneous daily  insulin lispro (HumaLOG) corrective regimen sliding scale   SubCutaneous three times a day before meals  insulin lispro (HumaLOG) corrective regimen sliding scale   SubCutaneous at bedtime  loperamide 16 milliGRAM(s) Oral <User Schedule>  octreotide  Injectable 100 MICROGram(s) SubCutaneous three times a day  ondansetron Injectable 4 milliGRAM(s) IV Push every 6 hours PRN  opium Tincture 6 milliGRAM(s) Oral <User Schedule>  pantoprazole    Tablet 40 milliGRAM(s) Oral before breakfast  Parenteral Nutrition - Adult 1 Each TPN Continuous <Continuous>  sodium chloride 0.9%. 1000 milliLiter(s) IV Continuous <Continuous>      LABS                        6.5    10.25 )-----------( 256      ( 12 Jan 2020 09:37 )             20.6     01-12    138  |  101  |  18  ----------------------------<  130<H>  3.6   |  30  |  0.48<L>    Ca    7.6<L>      12 Jan 2020 06:49  Phos  3.4     01-12  Mg     1.7     01-12    TPro  5.3<L>  /  Alb  2.1<L>  /  TBili  0.6  /  DBili  x   /  AST  12  /  ALT  13  /  AlkPhos  64  01-12          RADIOLOGY & ADDITIONAL STUDIES GENERAL SURGERY PROGRESS NOTE    SUBJECTIVE  Patient seen and examined. No acute events overnight. Reports tolerating diet without nausea, vomiting, 2675cc ileostomy output/24hours   voiding without issues, has been out of bed. Denies fever, chills, SOB, chest pain.     notes itchiness at L pleurex site, no rash noted around dressing  pleaurex to waterseal    OBJECTIVE    PHYSICAL EXAM  General: Appears well, NAD  CHEST: breathing comfortably, L chest pleurex SS on waterseal  CV: appears well perfused  Abdomen: soft, nontender, nondistended, no rebound or guarding, midline incision c/d/i, ostomy +/+  Extremities: Grossly symmetric    T(C): 36.9 (01-12-20 @ 06:36), Max: 37.2 (01-12-20 @ 01:33)  HR: 84 (01-12-20 @ 06:36) (81 - 89)  BP: 106/64 (01-12-20 @ 06:36) (105/60 - 115/66)  RR: 18 (01-12-20 @ 06:36) (18 - 18)  SpO2: 95% (01-12-20 @ 06:36) (95% - 97%)    01-11-20 @ 07:01  -  01-12-20 @ 07:00  --------------------------------------------------------  IN: 5494.6 mL / OUT: 3900 mL / NET: 1594.6 mL    01-12-20 @ 07:01  -  01-12-20 @ 10:57  --------------------------------------------------------  IN: 0 mL / OUT: 175 mL / NET: -175 mL        MEDICATIONS  acetaminophen   Tablet .. 975 milliGRAM(s) Oral every 6 hours  albuterol/ipratropium for Nebulization. 3 milliLiter(s) Nebulizer every 6 hours  buDESOnide    Inhalation Suspension 0.5 milliGRAM(s) Inhalation every 12 hours  chlorhexidine 2% Cloths 1 Application(s) Topical <User Schedule>  cholestyramine Powder (Sugar-Free) 4 Gram(s) Oral two times a day  diphenoxylate/atropine 2 Tablet(s) Oral <User Schedule>  enoxaparin Injectable 40 milliGRAM(s) SubCutaneous daily  insulin lispro (HumaLOG) corrective regimen sliding scale   SubCutaneous three times a day before meals  insulin lispro (HumaLOG) corrective regimen sliding scale   SubCutaneous at bedtime  loperamide 16 milliGRAM(s) Oral <User Schedule>  octreotide  Injectable 100 MICROGram(s) SubCutaneous three times a day  ondansetron Injectable 4 milliGRAM(s) IV Push every 6 hours PRN  opium Tincture 6 milliGRAM(s) Oral <User Schedule>  pantoprazole    Tablet 40 milliGRAM(s) Oral before breakfast  Parenteral Nutrition - Adult 1 Each TPN Continuous <Continuous>  sodium chloride 0.9%. 1000 milliLiter(s) IV Continuous <Continuous>      LABS                        6.5    10.25 )-----------( 256      ( 12 Jan 2020 09:37 )             20.6     01-12    138  |  101  |  18  ----------------------------<  130<H>  3.6   |  30  |  0.48<L>    Ca    7.6<L>      12 Jan 2020 06:49  Phos  3.4     01-12  Mg     1.7     01-12    TPro  5.3<L>  /  Alb  2.1<L>  /  TBili  0.6  /  DBili  x   /  AST  12  /  ALT  13  /  AlkPhos  64  01-12          RADIOLOGY & ADDITIONAL STUDIES

## 2020-01-12 NOTE — PROGRESS NOTE ADULT - SUBJECTIVE AND OBJECTIVE BOX
Subjective: Patient seen and examined. No new events except as noted.   Eating food   feels well       REVIEW OF SYSTEMS:    CONSTITUTIONAL: + weakness, fevers or chills  EYES/ENT: No visual changes;  No vertigo or throat pain   NECK: No pain or stiffness  RESPIRATORY: No cough, wheezing, hemoptysis; No shortness of breath  CARDIOVASCULAR: No chest pain or palpitations  GASTROINTESTINAL: No abdominal or epigastric pain. No nausea, vomiting, or hematemesis; No diarrhea or constipation. No melena or hematochezia.  GENITOURINARY: No dysuria, frequency or hematuria  NEUROLOGICAL: No numbness or weakness  SKIN: No itching, burning, rashes, or lesions   All other review of systems is negative unless indicated above.    MEDICATIONS:  MEDICATIONS  (STANDING):  acetaminophen   Tablet .. 975 milliGRAM(s) Oral every 6 hours  albuterol/ipratropium for Nebulization. 3 milliLiter(s) Nebulizer every 6 hours  buDESOnide    Inhalation Suspension 0.5 milliGRAM(s) Inhalation every 12 hours  chlorhexidine 2% Cloths 1 Application(s) Topical <User Schedule>  cholestyramine Powder (Sugar-Free) 4 Gram(s) Oral two times a day  diphenoxylate/atropine 2 Tablet(s) Oral <User Schedule>  enoxaparin Injectable 40 milliGRAM(s) SubCutaneous daily  fat emulsion (Fish Oil and Plant Based) 20% Infusion 20.8 mL/Hr (20.8 mL/Hr) IV Continuous <Continuous>  insulin lispro (HumaLOG) corrective regimen sliding scale   SubCutaneous three times a day before meals  insulin lispro (HumaLOG) corrective regimen sliding scale   SubCutaneous at bedtime  loperamide 16 milliGRAM(s) Oral <User Schedule>  octreotide  Injectable 100 MICROGram(s) SubCutaneous three times a day  opium Tincture 6 milliGRAM(s) Oral <User Schedule>  pantoprazole    Tablet 40 milliGRAM(s) Oral before breakfast  Parenteral Nutrition - Adult 1 Each (75 mL/Hr) TPN Continuous <Continuous>  Parenteral Nutrition - Adult 1 Each (75 mL/Hr) TPN Continuous <Continuous>  sodium chloride 0.9%. 1000 milliLiter(s) (10 mL/Hr) IV Continuous <Continuous>      PHYSICAL EXAM:  T(C): 36.9 (01-12-20 @ 06:36), Max: 37.2 (01-12-20 @ 01:33)  HR: 84 (01-12-20 @ 06:36) (81 - 89)  BP: 106/64 (01-12-20 @ 06:36) (105/60 - 115/66)  RR: 18 (01-12-20 @ 06:36) (18 - 18)  SpO2: 95% (01-12-20 @ 06:36) (95% - 97%)  Wt(kg): --  I&O's Summary    11 Jan 2020 07:01  -  12 Jan 2020 07:00  --------------------------------------------------------  IN: 5494.6 mL / OUT: 3900 mL / NET: 1594.6 mL    12 Jan 2020 07:01  -  12 Jan 2020 11:24  --------------------------------------------------------  IN: 0 mL / OUT: 175 mL / NET: -175 mL            Appearance: NAD   HEENT:   Dry oral mucosa, crusted lips   Lymphatic: No lymphadenopathy   Cardiovascular: Normal S1 S2, No JVD, No murmurs , Peripheral pulses palpable 2+ bilaterally  Respiratory: Decreased bs   Gastrointestinal:  Soft, NT, ND. Ostomy pink with output. Midline staples in place, midline incision site is c/d/i   Skin: No rashes, No ecchymoses, No cyanosis, warm to touch  Musculoskeletal: Decreased  range of motion and strength  Psychiatry:  mildly sedated   Ext: No edema +PICC line   +rosas         LABS:    CARDIAC MARKERS:                                6.5    10.25 )-----------( 256      ( 12 Jan 2020 09:37 )             20.6     01-12    138  |  101  |  18  ----------------------------<  130<H>  3.6   |  30  |  0.48<L>    Ca    7.6<L>      12 Jan 2020 06:49  Phos  3.4     01-12  Mg     1.7     01-12    TPro  5.3<L>  /  Alb  2.1<L>  /  TBili  0.6  /  DBili  x   /  AST  12  /  ALT  13  /  AlkPhos  64  01-12    proBNP:   Lipid Profile:   HgA1c:   TSH:             TELEMETRY: 	    ECG:  	  RADIOLOGY:   DIAGNOSTIC TESTING:  [ ] Echocardiogram:  [ ]  Catheterization:  [ ] Stress Test:    OTHER:

## 2020-01-13 LAB
ALBUMIN SERPL ELPH-MCNC: 2.3 G/DL — LOW (ref 3.3–5)
ALBUMIN SERPL ELPH-MCNC: 2.5 G/DL — LOW (ref 3.3–5)
ALP SERPL-CCNC: 67 U/L — SIGNIFICANT CHANGE UP (ref 40–120)
ALP SERPL-CCNC: 77 U/L — SIGNIFICANT CHANGE UP (ref 40–120)
ALT FLD-CCNC: 10 U/L — SIGNIFICANT CHANGE UP (ref 10–45)
ALT FLD-CCNC: 15 U/L — SIGNIFICANT CHANGE UP (ref 10–45)
ANION GAP SERPL CALC-SCNC: 11 MMOL/L — SIGNIFICANT CHANGE UP (ref 5–17)
ANION GAP SERPL CALC-SCNC: 9 MMOL/L — SIGNIFICANT CHANGE UP (ref 5–17)
APPEARANCE UR: ABNORMAL
APTT BLD: 48.6 SEC — HIGH (ref 27.5–36.3)
AST SERPL-CCNC: 15 U/L — SIGNIFICANT CHANGE UP (ref 10–40)
AST SERPL-CCNC: 17 U/L — SIGNIFICANT CHANGE UP (ref 10–40)
BACTERIA # UR AUTO: ABNORMAL
BILIRUB SERPL-MCNC: 0.7 MG/DL — SIGNIFICANT CHANGE UP (ref 0.2–1.2)
BILIRUB SERPL-MCNC: 0.8 MG/DL — SIGNIFICANT CHANGE UP (ref 0.2–1.2)
BILIRUB UR-MCNC: NEGATIVE — SIGNIFICANT CHANGE UP
BUN SERPL-MCNC: 15 MG/DL — SIGNIFICANT CHANGE UP (ref 7–23)
BUN SERPL-MCNC: 18 MG/DL — SIGNIFICANT CHANGE UP (ref 7–23)
CALCIUM SERPL-MCNC: 8.1 MG/DL — LOW (ref 8.4–10.5)
CALCIUM SERPL-MCNC: 8.2 MG/DL — LOW (ref 8.4–10.5)
CHLORIDE SERPL-SCNC: 100 MMOL/L — SIGNIFICANT CHANGE UP (ref 96–108)
CHLORIDE SERPL-SCNC: 101 MMOL/L — SIGNIFICANT CHANGE UP (ref 96–108)
CO2 SERPL-SCNC: 27 MMOL/L — SIGNIFICANT CHANGE UP (ref 22–31)
CO2 SERPL-SCNC: 32 MMOL/L — HIGH (ref 22–31)
COLOR SPEC: SIGNIFICANT CHANGE UP
CREAT SERPL-MCNC: 0.44 MG/DL — LOW (ref 0.5–1.3)
CREAT SERPL-MCNC: 0.51 MG/DL — SIGNIFICANT CHANGE UP (ref 0.5–1.3)
DIFF PNL FLD: ABNORMAL
EPI CELLS # UR: 0 /HPF — SIGNIFICANT CHANGE UP
GAS PNL BLDA: SIGNIFICANT CHANGE UP
GAS PNL BLDA: SIGNIFICANT CHANGE UP
GLUCOSE BLDC GLUCOMTR-MCNC: 119 MG/DL — HIGH (ref 70–99)
GLUCOSE BLDC GLUCOMTR-MCNC: 147 MG/DL — HIGH (ref 70–99)
GLUCOSE BLDC GLUCOMTR-MCNC: 162 MG/DL — HIGH (ref 70–99)
GLUCOSE BLDC GLUCOMTR-MCNC: 201 MG/DL — HIGH (ref 70–99)
GLUCOSE BLDC GLUCOMTR-MCNC: 91 MG/DL — SIGNIFICANT CHANGE UP (ref 70–99)
GLUCOSE SERPL-MCNC: 168 MG/DL — HIGH (ref 70–99)
GLUCOSE SERPL-MCNC: 229 MG/DL — HIGH (ref 70–99)
GLUCOSE UR QL: NEGATIVE — SIGNIFICANT CHANGE UP
HCT VFR BLD CALC: 23.7 % — LOW (ref 39–50)
HCT VFR BLD CALC: 27.8 % — LOW (ref 39–50)
HGB BLD-MCNC: 7.4 G/DL — LOW (ref 13–17)
HGB BLD-MCNC: 8.8 G/DL — LOW (ref 13–17)
HYALINE CASTS # UR AUTO: 0 /LPF — SIGNIFICANT CHANGE UP (ref 0–2)
INR BLD: 1.22 RATIO — HIGH (ref 0.88–1.16)
KETONES UR-MCNC: NEGATIVE — SIGNIFICANT CHANGE UP
LEUKOCYTE ESTERASE UR-ACNC: NEGATIVE — SIGNIFICANT CHANGE UP
MAGNESIUM SERPL-MCNC: 1.7 MG/DL — SIGNIFICANT CHANGE UP (ref 1.6–2.6)
MAGNESIUM SERPL-MCNC: 1.8 MG/DL — SIGNIFICANT CHANGE UP (ref 1.6–2.6)
MCHC RBC-ENTMCNC: 30.5 PG — SIGNIFICANT CHANGE UP (ref 27–34)
MCHC RBC-ENTMCNC: 30.9 PG — SIGNIFICANT CHANGE UP (ref 27–34)
MCHC RBC-ENTMCNC: 31.2 GM/DL — LOW (ref 32–36)
MCHC RBC-ENTMCNC: 31.7 GM/DL — LOW (ref 32–36)
MCV RBC AUTO: 97.5 FL — SIGNIFICANT CHANGE UP (ref 80–100)
MCV RBC AUTO: 97.5 FL — SIGNIFICANT CHANGE UP (ref 80–100)
NITRITE UR-MCNC: NEGATIVE — SIGNIFICANT CHANGE UP
NRBC # BLD: 0 /100 WBCS — SIGNIFICANT CHANGE UP (ref 0–0)
NRBC # BLD: 0 /100 WBCS — SIGNIFICANT CHANGE UP (ref 0–0)
PH UR: 5.5 — SIGNIFICANT CHANGE UP (ref 5–8)
PHOSPHATE SERPL-MCNC: 3.6 MG/DL — SIGNIFICANT CHANGE UP (ref 2.5–4.5)
PHOSPHATE SERPL-MCNC: 3.6 MG/DL — SIGNIFICANT CHANGE UP (ref 2.5–4.5)
PLATELET # BLD AUTO: 277 K/UL — SIGNIFICANT CHANGE UP (ref 150–400)
PLATELET # BLD AUTO: 388 K/UL — SIGNIFICANT CHANGE UP (ref 150–400)
POTASSIUM SERPL-MCNC: 3.9 MMOL/L — SIGNIFICANT CHANGE UP (ref 3.5–5.3)
POTASSIUM SERPL-MCNC: 4.1 MMOL/L — SIGNIFICANT CHANGE UP (ref 3.5–5.3)
POTASSIUM SERPL-SCNC: 3.9 MMOL/L — SIGNIFICANT CHANGE UP (ref 3.5–5.3)
POTASSIUM SERPL-SCNC: 4.1 MMOL/L — SIGNIFICANT CHANGE UP (ref 3.5–5.3)
PROCALCITONIN SERPL-MCNC: 0.07 NG/ML — SIGNIFICANT CHANGE UP (ref 0.02–0.1)
PROT SERPL-MCNC: 6 G/DL — SIGNIFICANT CHANGE UP (ref 6–8.3)
PROT SERPL-MCNC: 6.5 G/DL — SIGNIFICANT CHANGE UP (ref 6–8.3)
PROT UR-MCNC: ABNORMAL
PROTHROM AB SERPL-ACNC: 14.1 SEC — HIGH (ref 10–12.9)
RBC # BLD: 2.43 M/UL — LOW (ref 4.2–5.8)
RBC # BLD: 2.85 M/UL — LOW (ref 4.2–5.8)
RBC # FLD: 15.3 % — HIGH (ref 10.3–14.5)
RBC # FLD: 15.3 % — HIGH (ref 10.3–14.5)
RBC CASTS # UR COMP ASSIST: >50 /HPF — SIGNIFICANT CHANGE UP (ref 0–4)
SODIUM SERPL-SCNC: 138 MMOL/L — SIGNIFICANT CHANGE UP (ref 135–145)
SODIUM SERPL-SCNC: 142 MMOL/L — SIGNIFICANT CHANGE UP (ref 135–145)
SP GR SPEC: 1.02 — SIGNIFICANT CHANGE UP (ref 1.01–1.02)
UROBILINOGEN FLD QL: NEGATIVE — SIGNIFICANT CHANGE UP
WBC # BLD: 12.81 K/UL — HIGH (ref 3.8–10.5)
WBC # BLD: 21.96 K/UL — HIGH (ref 3.8–10.5)
WBC # FLD AUTO: 12.81 K/UL — HIGH (ref 3.8–10.5)
WBC # FLD AUTO: 21.96 K/UL — HIGH (ref 3.8–10.5)
WBC UR QL: 6 /HPF — HIGH (ref 0–5)

## 2020-01-13 PROCEDURE — 99233 SBSQ HOSP IP/OBS HIGH 50: CPT

## 2020-01-13 PROCEDURE — 71045 X-RAY EXAM CHEST 1 VIEW: CPT | Mod: 26,77

## 2020-01-13 PROCEDURE — 99231 SBSQ HOSP IP/OBS SF/LOW 25: CPT

## 2020-01-13 PROCEDURE — 99232 SBSQ HOSP IP/OBS MODERATE 35: CPT

## 2020-01-13 PROCEDURE — 71045 X-RAY EXAM CHEST 1 VIEW: CPT | Mod: 26

## 2020-01-13 RX ORDER — VANCOMYCIN HCL 1 G
1000 VIAL (EA) INTRAVENOUS ONCE
Refills: 0 | Status: COMPLETED | OUTPATIENT
Start: 2020-01-13 | End: 2020-01-13

## 2020-01-13 RX ORDER — I.V. FAT EMULSION 20 G/100ML
20.8 EMULSION INTRAVENOUS
Qty: 50 | Refills: 0 | Status: DISCONTINUED | OUTPATIENT
Start: 2020-01-13 | End: 2020-01-14

## 2020-01-13 RX ORDER — IPRATROPIUM/ALBUTEROL SULFATE 18-103MCG
3 AEROSOL WITH ADAPTER (GRAM) INHALATION ONCE
Refills: 0 | Status: COMPLETED | OUTPATIENT
Start: 2020-01-13 | End: 2020-01-13

## 2020-01-13 RX ORDER — ELECTROLYTE SOLUTION,INJ
1 VIAL (ML) INTRAVENOUS
Refills: 0 | Status: DISCONTINUED | OUTPATIENT
Start: 2020-01-13 | End: 2020-01-13

## 2020-01-13 RX ORDER — PIPERACILLIN AND TAZOBACTAM 4; .5 G/20ML; G/20ML
3.38 INJECTION, POWDER, LYOPHILIZED, FOR SOLUTION INTRAVENOUS ONCE
Refills: 0 | Status: COMPLETED | OUTPATIENT
Start: 2020-01-13 | End: 2020-01-13

## 2020-01-13 RX ORDER — PIPERACILLIN AND TAZOBACTAM 4; .5 G/20ML; G/20ML
3.38 INJECTION, POWDER, LYOPHILIZED, FOR SOLUTION INTRAVENOUS EVERY 8 HOURS
Refills: 0 | Status: DISCONTINUED | OUTPATIENT
Start: 2020-01-13 | End: 2020-01-18

## 2020-01-13 RX ORDER — LABETALOL HCL 100 MG
10 TABLET ORAL ONCE
Refills: 0 | Status: DISCONTINUED | OUTPATIENT
Start: 2020-01-13 | End: 2020-01-13

## 2020-01-13 RX ADMIN — Medication 1 EACH: at 08:44

## 2020-01-13 RX ADMIN — Medication 2 TABLET(S): at 11:56

## 2020-01-13 RX ADMIN — I.V. FAT EMULSION 20.8 ML/HR: 20 EMULSION INTRAVENOUS at 08:44

## 2020-01-13 RX ADMIN — CHLORHEXIDINE GLUCONATE 1 APPLICATION(S): 213 SOLUTION TOPICAL at 06:09

## 2020-01-13 RX ADMIN — MORPHINE 6 MILLIGRAM(S): 10 SOLUTION ORAL at 22:46

## 2020-01-13 RX ADMIN — Medication 975 MILLIGRAM(S): at 23:01

## 2020-01-13 RX ADMIN — OCTREOTIDE ACETATE 100 MICROGRAM(S): 200 INJECTION, SOLUTION INTRAVENOUS; SUBCUTANEOUS at 22:47

## 2020-01-13 RX ADMIN — PIPERACILLIN AND TAZOBACTAM 25 GRAM(S): 4; .5 INJECTION, POWDER, LYOPHILIZED, FOR SOLUTION INTRAVENOUS at 06:09

## 2020-01-13 RX ADMIN — CHOLESTYRAMINE 4 GRAM(S): 4 POWDER, FOR SUSPENSION ORAL at 22:01

## 2020-01-13 RX ADMIN — I.V. FAT EMULSION 20.8 ML/HR: 20 EMULSION INTRAVENOUS at 17:31

## 2020-01-13 RX ADMIN — Medication 975 MILLIGRAM(S): at 12:12

## 2020-01-13 RX ADMIN — OCTREOTIDE ACETATE 100 MICROGRAM(S): 200 INJECTION, SOLUTION INTRAVENOUS; SUBCUTANEOUS at 08:45

## 2020-01-13 RX ADMIN — PIPERACILLIN AND TAZOBACTAM 25 GRAM(S): 4; .5 INJECTION, POWDER, LYOPHILIZED, FOR SOLUTION INTRAVENOUS at 21:59

## 2020-01-13 RX ADMIN — Medication 16 MILLIGRAM(S): at 22:45

## 2020-01-13 RX ADMIN — PIPERACILLIN AND TAZOBACTAM 200 GRAM(S): 4; .5 INJECTION, POWDER, LYOPHILIZED, FOR SOLUTION INTRAVENOUS at 03:39

## 2020-01-13 RX ADMIN — Medication 16 MILLIGRAM(S): at 17:31

## 2020-01-13 RX ADMIN — SODIUM CHLORIDE 10 MILLILITER(S): 9 INJECTION INTRAMUSCULAR; INTRAVENOUS; SUBCUTANEOUS at 17:28

## 2020-01-13 RX ADMIN — Medication 3 MILLILITER(S): at 17:25

## 2020-01-13 RX ADMIN — Medication 2 TABLET(S): at 22:47

## 2020-01-13 RX ADMIN — Medication 975 MILLIGRAM(S): at 17:28

## 2020-01-13 RX ADMIN — Medication 3 MILLILITER(S): at 06:48

## 2020-01-13 RX ADMIN — PIPERACILLIN AND TAZOBACTAM 25 GRAM(S): 4; .5 INJECTION, POWDER, LYOPHILIZED, FOR SOLUTION INTRAVENOUS at 13:14

## 2020-01-13 RX ADMIN — Medication 1 EACH: at 17:31

## 2020-01-13 RX ADMIN — Medication 3 MILLILITER(S): at 12:20

## 2020-01-13 RX ADMIN — CHOLESTYRAMINE 4 GRAM(S): 4 POWDER, FOR SUSPENSION ORAL at 11:58

## 2020-01-13 RX ADMIN — Medication 16 MILLIGRAM(S): at 11:57

## 2020-01-13 RX ADMIN — Medication 250 MILLIGRAM(S): at 11:55

## 2020-01-13 RX ADMIN — OCTREOTIDE ACETATE 100 MICROGRAM(S): 200 INJECTION, SOLUTION INTRAVENOUS; SUBCUTANEOUS at 13:14

## 2020-01-13 RX ADMIN — Medication 975 MILLIGRAM(S): at 00:00

## 2020-01-13 RX ADMIN — MORPHINE 6 MILLIGRAM(S): 10 SOLUTION ORAL at 11:56

## 2020-01-13 RX ADMIN — MORPHINE 6 MILLIGRAM(S): 10 SOLUTION ORAL at 17:29

## 2020-01-13 RX ADMIN — Medication 2 TABLET(S): at 17:28

## 2020-01-13 RX ADMIN — ENOXAPARIN SODIUM 40 MILLIGRAM(S): 100 INJECTION SUBCUTANEOUS at 12:13

## 2020-01-13 RX ADMIN — Medication 0.5 MILLIGRAM(S): at 06:48

## 2020-01-13 RX ADMIN — Medication 1: at 12:25

## 2020-01-13 RX ADMIN — Medication 975 MILLIGRAM(S): at 17:43

## 2020-01-13 RX ADMIN — Medication 975 MILLIGRAM(S): at 12:42

## 2020-01-13 RX ADMIN — Medication 0.5 MILLIGRAM(S): at 17:25

## 2020-01-13 NOTE — RAPID RESPONSE TEAM SUMMARY - NSOTHERINTERVENTIONSRRT_GEN_ALL_CORE
Surgical team called and the patient will be transferred once bed becomes available   -Probable reoccurrence of Pneumothorax  now resolved once Chest Tube placed back to suction   - Possible new left upper lobe consolidation possible PNA may f/u with all cx/ procalcitonin - consider RVP   - Possible flash Pulm Edema secondary to HTN   Please follow up with repeat chest x ray in 4 -6 hrs (earlier if decompensation)   SICU for follow up and management Surgical team called and the patient will be transferred once bed becomes available   -Probable reoccurrence of Pneumothorax  now resolved once Chest Tube placed back to suction   - Possible new left upper lobe consolidation possible PNA may f/u with all cx/ procalcitonin - consider RVP   - Possible flash Pulm Edema secondary to HTN   - Possible COPD exacerbation - dounebs / bipap PRN   Please follow up with repeat chest x ray in 4 -6 hrs (earlier if decompensation) and Blood gas   SICU for follow up and management

## 2020-01-13 NOTE — PROGRESS NOTE ADULT - SUBJECTIVE AND OBJECTIVE BOX
Subjective: Patient seen and examined. Events noted   Brought back to ICU for respiratory distress   no cp or sob       REVIEW OF SYSTEMS:    CONSTITUTIONAL: +weakness, fevers or chills  EYES/ENT: No visual changes;  No vertigo or throat pain   NECK: No pain or stiffness  RESPIRATORY: No cough, wheezing, hemoptysis; + shortness of breath  CARDIOVASCULAR: No chest pain or palpitations  GASTROINTESTINAL: No abdominal or epigastric pain. No nausea, vomiting, or hematemesis; No diarrhea or constipation. No melena or hematochezia.  GENITOURINARY: No dysuria, frequency or hematuria  NEUROLOGICAL: No numbness or weakness  SKIN: No itching, burning, rashes, or lesions   All other review of systems is negative unless indicated above.    MEDICATIONS:  MEDICATIONS  (STANDING):  acetaminophen   Tablet .. 975 milliGRAM(s) Oral every 6 hours  albuterol/ipratropium for Nebulization. 3 milliLiter(s) Nebulizer every 6 hours  buDESOnide    Inhalation Suspension 0.5 milliGRAM(s) Inhalation every 12 hours  chlorhexidine 2% Cloths 1 Application(s) Topical <User Schedule>  cholestyramine Powder (Sugar-Free) 4 Gram(s) Oral two times a day  diphenoxylate/atropine 2 Tablet(s) Oral <User Schedule>  enoxaparin Injectable 40 milliGRAM(s) SubCutaneous daily  fat emulsion (Fish Oil and Plant Based) 20% Infusion 20.8 mL/Hr (20.8 mL/Hr) IV Continuous <Continuous>  insulin lispro (HumaLOG) corrective regimen sliding scale   SubCutaneous three times a day before meals  insulin lispro (HumaLOG) corrective regimen sliding scale   SubCutaneous at bedtime  loperamide 16 milliGRAM(s) Oral <User Schedule>  octreotide  Injectable 100 MICROGram(s) SubCutaneous three times a day  opium Tincture 6 milliGRAM(s) Oral <User Schedule>  pantoprazole    Tablet 40 milliGRAM(s) Oral before breakfast  Parenteral Nutrition - Adult 1 Each (75 mL/Hr) TPN Continuous <Continuous>  Parenteral Nutrition - Adult 1 Each (50 mL/Hr) TPN Continuous <Continuous>  piperacillin/tazobactam IVPB.. 3.375 Gram(s) IV Intermittent every 8 hours  sodium chloride 0.9%. 1000 milliLiter(s) (10 mL/Hr) IV Continuous <Continuous>      PHYSICAL EXAM:  T(C): 37.5 (01-13-20 @ 15:00), Max: 37.5 (01-13-20 @ 15:00)  HR: 84 (01-13-20 @ 20:00) (63 - 94)  BP: 99/54 (01-13-20 @ 20:00) (88/55 - 142/72)  RR: 18 (01-13-20 @ 20:00) (18 - 31)  SpO2: 95% (01-13-20 @ 20:00) (94% - 100%)  Wt(kg): --  I&O's Summary    12 Jan 2020 07:01  -  13 Jan 2020 07:00  --------------------------------------------------------  IN: 2335 mL / OUT: 2135 mL / NET: 200 mL    13 Jan 2020 07:01  -  13 Jan 2020 20:46  --------------------------------------------------------  IN: 1333.2 mL / OUT: 1160 mL / NET: 173.2 mL          Appearance: NAD   HEENT:   Dry oral mucosa, crusted lips   Lymphatic: No lymphadenopathy   Cardiovascular: Normal S1 S2, No JVD, No murmurs , Peripheral pulses palpable 2+ bilaterally  Respiratory: Decreased bs R sided + pigtail   Gastrointestinal:  Soft, NT, ND. Ostomy pink with output. Midline staples in place, midline incision site is c/d/i   Skin: No rashes, No ecchymoses, No cyanosis, warm to touch  Musculoskeletal: Decreased  range of motion and strength  Psychiatry:  mildly sedated   Ext: No edema +PICC line   +rosas     LABS:    CARDIAC MARKERS:                                7.4    12.81 )-----------( 277      ( 13 Jan 2020 12:27 )             23.7     01-13    142  |  101  |  18  ----------------------------<  168<H>  3.9   |  32<H>  |  0.51    Ca    8.2<L>      13 Jan 2020 12:27  Phos  3.6     01-13  Mg     1.7     01-13    TPro  6.0  /  Alb  2.3<L>  /  TBili  0.7  /  DBili  x   /  AST  15  /  ALT  10  /  AlkPhos  67  01-13    proBNP:   Lipid Profile:   HgA1c:   TSH:             TELEMETRY: 	SR    ECG:  	  RADIOLOGY: < from: Xray Chest 1 View- PORTABLE-Routine (01.13.20 @ 09:47) >    EXAM:  XR CHEST PORTABLE ROUTINE 1V                            PROCEDURE DATE:  01/13/2020            INTERPRETATION:  CLINICAL INFORMATION: Shortness of breath. Pleurx placement.    TECHNIQUE: Frontal radiograph of the chest.    COMPARISON: Chest radiograph 1/13/2020 at 2:38 AM.    FINDINGS:    LINES/TUBES: Interval placement of right-sided chest tube. Left pleural catheter is unchanged. Right-sided PICC line terminates in the SVC.    LUNGS: Interval decrease in left lung opacities. Right lower lung opacities.     PLEURA: Small bilateral pleural effusions. Loculated left pleural effusion. No definite pneumothorax.    HEART AND MEDIASTINUM: Heart size is within normal limits.    SKELETON/SOFT TISSUES: Left chest wall subcutaneous emphysema,slightly decreased.      IMPRESSION:     Interval placement of right-sided chest tube.  Bilateral lower lung patchy opacities and small bilateral pleural effusions likely representing pulmonary edema.                PATO MOHAMUD M.D., RADIOLOGY RESIDENT  This document has been electronically signed.  TRUDI DIETZ M.D., ATTENDING RADIOLOGIST  This document has been electronically signed. Jan 13 2020 12:24PM                < end of copied text >    DIAGNOSTIC TESTING:  [ ] Echocardiogram:  [ ]  Catheterization:  [ ] Stress Test:    OTHER:

## 2020-01-13 NOTE — PROGRESS NOTE ADULT - SUBJECTIVE AND OBJECTIVE BOX
SICU READMISSION NOTE     74y Male presented with abdominal pain, nausea, hematemesis, and poor PO intake for ~2-3 days. Labs significant for an CHI w/ Cr 2.74 and lactate of 9.4. He was also notably tachycardic to the 110s and hypotensive w/ SBP in the 70s. He was given 2 units of PRBCs and 2 L of LR in the ED due to concern for upper GI bleeding. Imaging revealed high grade SBO in the RLQ. Patient was taken to the OR emergently for an exploratory laparotomy, lysis of adhesions, decompression of bowel via enterotomy w/ primary repair, and Abthera VAC placement. Of note, the distal 50% of the bowel appeared dusky but viable. He required vasopressor support with phenylephrine and vasopressin infusions. He received 3000 mL of crystalloid w/ EBL of 10 mL and UOP of 25 mL. Patient was left intubated at the end of the case so SICU was consulted for hemodynamic monitoring. Taken back to the OR on 12/8 and underwent take down of Abthera, washout and re-application of the Abthera vac. Went back to OR on 12/10 night for colectomy. Post-op course complicated by short gut syndrome with high ileostomy output as well as left pleural effusion requiring IR guided pigtail catheter placement 12/30. Patient then had VATS on left side for PleurX catheter placement. 01/10.     SICU reconsulted for RRT on the floor for respiratory distress and oxygen desaturation. Patient placed on BiPAP to good effect and left pleurx catheter placed back to suction.         SUBJECTIVE/ROS:  [ ] A ten-point review of systems was otherwise negative except as noted.  [ ] Due to altered mental status/intubation, subjective information were not able to be obtained from the patient. History was obtained, to the extent possible, from review of the chart and collateral sources of information.      NEURO  Exam: awake, alert, oriented  Meds: acetaminophen   Tablet .. 975 milliGRAM(s) Oral every 6 hours  ondansetron Injectable 4 milliGRAM(s) IV Push every 6 hours PRN Nausea and/or Vomiting  opium Tincture 6 milliGRAM(s) Oral <User Schedule>    [x] Adequacy of sedation and pain control has been assessed and adjusted      RESPIRATORY  RR: 18 (01-13-20 @ 01:13) (18 - 18)  SpO2: 99% (01-13-20 @ 03:15) (94% - 100%)  Exam: unlabored, clear to auscultation bilaterally  Mechanical Ventilation: none  ABG - ( 13 Jan 2020 02:58 )  pH: 7.30  /  pCO2: 66    /  pO2: 228   / HCO3: 32    / Base Excess: 4.5   /  SaO2: 100     Lactate: x      [N/A] Extubation Readiness Assessed  Meds: albuterol/ipratropium for Nebulization 3 milliLiter(s) Nebulizer once  albuterol/ipratropium for Nebulization. 3 milliLiter(s) Nebulizer every 6 hours  buDESOnide    Inhalation Suspension 0.5 milliGRAM(s) Inhalation every 12 hours        CARDIOVASCULAR  HR: 86 (01-13-20 @ 03:15) (78 - 91)  BP: 119/65 (01-13-20 @ 01:13) (103/66 - 142/72)  Exam: regular rate and rhythm  Cardiac Rhythm: sinus  Perfusion     [x]Adequate   [ ]Inadequate  Mentation   [x]Normal       [ ]Reduced  Extremities  [x]Warm         [ ]Cool  Volume Status [ ]Hypervolemic [x]Euvolemic [ ]Hypovolemic  Meds: labetalol Injectable 10 milliGRAM(s) IV Push once        GI/NUTRITION  Exam: soft, nontender, nondistended  Diet: mechanical soft   Meds: diphenoxylate/atropine 2 Tablet(s) Oral <User Schedule>  loperamide 16 milliGRAM(s) Oral <User Schedule>  pantoprazole    Tablet 40 milliGRAM(s) Oral before breakfast      GENITOURINARY  I&O's Detail    01-11 @ 07:01  -  01-12 @ 07:00  --------------------------------------------------------  IN:    fat emulsion (Fish Oil and Plant Based) 20% Infusion: 249.6 mL    Oral Fluid: 530 mL    sodium chloride 0.9%.: 240 mL    Solution: 2675 mL    TPN (Total Parenteral Nutrition): 1800 mL  Total IN: 5494.6 mL    OUT:    Chest Tube: 50 mL    Ileostomy: 2675 mL    Voided: 1175 mL  Total OUT: 3900 mL    Total NET: 1594.6 mL      01-12 @ 07:01  -  01-13 @ 04:14  --------------------------------------------------------  IN:    Oral Fluid: 340 mL    Packed Red Blood Cells: 400 mL    sodium chloride 0.9%.: 120 mL    Solution: 500 mL    TPN (Total Parenteral Nutrition): 900 mL  Total IN: 2260 mL    OUT:    Chest Tube: 10 mL    Ileostomy: 950 mL    Voided: 1125 mL  Total OUT: 2085 mL    Total NET: 175 mL          01-13    138  |  100  |  15  ----------------------------<  229<H>  4.1   |  27  |  0.44<L>    Ca    8.1<L>      13 Jan 2020 02:53  Phos  3.6     01-13  Mg     1.8     01-13    TPro  6.5  /  Alb  2.5<L>  /  TBili  0.8  /  DBili  x   /  AST  17  /  ALT  15  /  AlkPhos  77  01-13    [ ] Martinez catheter, indication: N/A  Meds: fat emulsion (Fish Oil and Plant Based) 20% Infusion 20.8 mL/Hr IV Continuous <Continuous>  Parenteral Nutrition - Adult 1 Each TPN Continuous <Continuous>  sodium chloride 0.9%. 1000 milliLiter(s) IV Continuous <Continuous>        HEMATOLOGIC  Meds: enoxaparin Injectable 40 milliGRAM(s) SubCutaneous daily    [x] VTE Prophylaxis                        8.8    21.96 )-----------( 388      ( 13 Jan 2020 02:53 )             27.8     PT/INR - ( 13 Jan 2020 03:06 )   PT: 14.1 sec;   INR: 1.22 ratio         PTT - ( 13 Jan 2020 03:06 )  PTT:48.6 sec  Transfusion     [ ] PRBC   [ ] Platelets   [ ] FFP   [ ] Cryoprecipitate      INFECTIOUS DISEASES  WBC Count: 21.96 K/uL (01-13 @ 02:53)  WBC Count: 15.75 K/uL (01-12 @ 17:53)  WBC Count: 13.87 K/uL (01-12 @ 11:23)  WBC Count: 10.25 K/uL (01-12 @ 09:37)    RECENT CULTURES:    Meds: piperacillin/tazobactam IVPB.. 3.375 Gram(s) IV Intermittent every 8 hours        ENDOCRINE  CAPILLARY BLOOD GLUCOSE      POCT Blood Glucose.: 201 mg/dL (13 Jan 2020 02:31)  POCT Blood Glucose.: 140 mg/dL (12 Jan 2020 22:43)  POCT Blood Glucose.: 130 mg/dL (12 Jan 2020 18:53)  POCT Blood Glucose.: 141 mg/dL (12 Jan 2020 13:37)  POCT Blood Glucose.: 143 mg/dL (12 Jan 2020 12:09)    Meds: cholestyramine Powder (Sugar-Free) 4 Gram(s) Oral two times a day  insulin lispro (HumaLOG) corrective regimen sliding scale   SubCutaneous three times a day before meals  insulin lispro (HumaLOG) corrective regimen sliding scale   SubCutaneous at bedtime  octreotide  Injectable 100 MICROGram(s) SubCutaneous three times a day        ACCESS DEVICES:  [x] Peripheral IV  [ ] Central Venous Line	[ ] R	[ ] L	[ ] IJ	[ ] Fem	[ ] SC	Placed:   [ ] Arterial Line		[ ] R	[ ] L	[ ] Fem	[ ] Rad	[ ] Ax	Placed:   [ ] PICC:					[ ] Mediport  [ ] Urinary Catheter, Date Placed:   [x] Necessity of urinary, arterial, and venous catheters discussed    OTHER MEDICATIONS:  chlorhexidine 2% Cloths 1 Application(s) Topical <User Schedule>      CODE STATUS: full code

## 2020-01-13 NOTE — PROGRESS NOTE ADULT - SUBJECTIVE AND OBJECTIVE BOX
VITAL SIGN    Vital Signs Last 24 Hrs  T(C): 37.2 (01-13-20 @ 12:00), Max: 37.2 (01-13-20 @ 12:00)  T(F): 99 (01-13-20 @ 12:00), Max: 99 (01-13-20 @ 12:00)  HR: 81 (01-13-20 @ 12:00) (63 - 91)  BP: 112/57 (01-13-20 @ 12:00) (88/55 - 142/72)  RR: 28 (01-13-20 @ 12:00) (18 - 31)  SpO2: 96% (01-13-20 @ 12:00) (94% - 100%)                   Daily     Daily       Bilirubin Total, Serum: 0.8 mg/dL (01-13 @ 02:53)    CAPILLARY BLOOD GLUCOSE      POCT Blood Glucose.: 91 mg/dL (13 Jan 2020 08:42)  POCT Blood Glucose.: 201 mg/dL (13 Jan 2020 02:31)  POCT Blood Glucose.: 140 mg/dL (12 Jan 2020 22:43)  POCT Blood Glucose.: 130 mg/dL (12 Jan 2020 18:53)  POCT Blood Glucose.: 141 mg/dL (12 Jan 2020 13:37)          Drains:                   L Plx  to pleurvac  lws                       PHYSICAL EXAM    Neurology: alert and oriented x 3, moves all extremities with no defecits  CV :  RRR  lt plx to pleurvac  Lungs:   CTA B/L  Extremities:     bl le edema

## 2020-01-13 NOTE — PROGRESS NOTE ADULT - SUBJECTIVE AND OBJECTIVE BOX
[facility] NUTRITION SUPPORT / TPN -- FOLLOW UP NOTE  --------------------------------------------------------------------------------    24 hour events/subjective:  Pt now in SICU after  RRT called for resp distress hypoxemia.  S/p lt VATS and pleurax catheter placement on Friday,  Pt responding to Lt  CT placed to suction & Lasix & BiPAP- breathing more easily  CXR b/l effusion post op haziness throughout lt lung field  Decreased Ileostomy output- but  pt was NPO            replacement with .5 cc per cc output with NS            continue lomotil, imodium, octreotide, tincture of opium  TPN  Leukocytosis, Vanco/ Zosyn- recent cultures neg  Pt tolerating  diet- eating well, w/o n/v  Pt denies any pain/ cough/ palp  No f/c/s        Diet:  Diet, Mechanical Soft:   Supplement Feeding Modality:  Oral  Ensure Enlive Cans or Servings Per Day:  3       Frequency:  Three Times a day (01-11-20 @ 13:32)      Appetite: [  ]Poor [  ]Adequate [ x ]Good  Caloric intake:  [ x  ]  Adequate   [   ] Inadequate    ROS: General/ GI see HPI  all other systems negative      ALLERGIES & MEDICATIONS  --------------------------------------------------------------------------------  ALLERGIES  IV Contrast (Hives)    STANDING INPATIENT MEDICATIONS    acetaminophen   Tablet .. 975 milliGRAM(s) Oral every 6 hours  albuterol/ipratropium for Nebulization. 3 milliLiter(s) Nebulizer every 6 hours  buDESOnide    Inhalation Suspension 0.5 milliGRAM(s) Inhalation every 12 hours  chlorhexidine 2% Cloths 1 Application(s) Topical <User Schedule>  cholestyramine Powder (Sugar-Free) 4 Gram(s) Oral two times a day  diphenoxylate/atropine 2 Tablet(s) Oral <User Schedule>  enoxaparin Injectable 40 milliGRAM(s) SubCutaneous daily  fat emulsion (Fish Oil and Plant Based) 20% Infusion 20.8 mL/Hr IV Continuous <Continuous>  insulin lispro (HumaLOG) corrective regimen sliding scale   SubCutaneous three times a day before meals  insulin lispro (HumaLOG) corrective regimen sliding scale   SubCutaneous at bedtime  loperamide 16 milliGRAM(s) Oral <User Schedule>  octreotide  Injectable 100 MICROGram(s) SubCutaneous three times a day  opium Tincture 6 milliGRAM(s) Oral <User Schedule>  pantoprazole    Tablet 40 milliGRAM(s) Oral before breakfast  Parenteral Nutrition - Adult 1 Each TPN Continuous <Continuous>  Parenteral Nutrition - Adult 1 Each TPN Continuous <Continuous>  piperacillin/tazobactam IVPB.. 3.375 Gram(s) IV Intermittent every 8 hours  sodium chloride 0.9%. 1000 milliLiter(s) IV Continuous <Continuous>      PRN INPATIENT MEDICATION  ondansetron Injectable 4 milliGRAM(s) IV Push every 6 hours PRN        VITALS/PHYSICAL EXAM  --------------------------------------------------------------------------------  T(C): 37.5 (01-13-20 @ 15:00), Max: 37.5 (01-13-20 @ 15:00)  HR: 94 (01-13-20 @ 15:00) (63 - 94)  BP: 110/56 (01-13-20 @ 15:00) (88/55 - 142/72)  RR: 27 (01-13-20 @ 15:00) (18 - 31)  SpO2: 95% (01-13-20 @ 15:00) (94% - 100%)  Wt(kg): --        01-12-20 @ 07:01  -  01-13-20 @ 07:00  --------------------------------------------------------  IN: 2335 mL / OUT: 2135 mL / NET: 200 mL    01-13-20 @ 07:01  -  01-13-20 @ 16:33  --------------------------------------------------------  IN: 895.8 mL / OUT: 600 mL / NET: 295.8 mL    PHYSICAL EXAM  --------------------------------------------------------------------------------  	Gen: guarded but stable, A&Ox3, BiPAP  	HEENT: NC/AT, PERRL, supple neck, clear oropharynx              Chest: decreased basilar BS, Lt chest pleureX Cath to wall suction, dressing c/d/i  	GI: (+) BS, softly distended, non tender                   midline incision c/d/i w/o drainage                   (+)ostomy pink viable- thick light brown liquid stool like material              MSK: FROM x4, no contractures nor deformities  	Vascular: Equally Warm, (+)BLE edema, no edema BUE,  no clubbing, cyanosis,                        Rt PICC dressing c/d/I w/o swelling or s/sx infection   	Neuro: No focal deficits, intact sensation, weakened strength  	Psych: Normal affect and mood            LABS/ CULTURES/ RADIOLOGY:              7.4    12.81 >-----------<  277      [01-13-20 @ 12:27]              23.7     142  |  101  |  18  ----------------------------<  168      [01-13-20 @ 12:27]  3.9   |  32  |  0.51        Ca     8.2     [01-13-20 @ 12:27]      iCa    1.15     [01-12 @ 07:19]      Mg     1.7     [01-13-20 @ 12:27]      Phos  3.6     [01-13-20 @ 12:27]    TPro  6.0  /  Alb  2.3  /  TBili  0.7  /  DBili  x   /  AST  15  /  ALT  10  /  AlkPhos  67  [01-13-20 @ 12:27]    PT/INR: PT 14.1 , INR 1.22       [01-13-20 @ 03:06]  PTT: 48.6       [01-13-20 @ 03:06]      Blood Gas Arterial - Calcium, Ionized: 1.14 mmoL/L (01-13-20 @ 12:25)  Blood Gas Arterial - Calcium, Ionized: 1.14 mmoL/L (01-13-20 @ 02:58)      Blood Gas Arterial, Lactate: 0.6 mmol/L (01.13.20 @ 12:25)    Blood Gas Profile - Arterial (01.13.20 @ 12:25)    pH, Arterial: 7.40    pCO2, Arterial: 57 mmHg    pO2, Arterial: 71 mmHg    HCO3, Arterial: 35 mmoL/L    Base Excess, Arterial: 9.5 mmol/L    Oxygen Saturation, Arterial: 94 %    Total CO2, Arterial: 37 mmoL/L    FIO2, Arterial: 40        Glucose, Serum: 168 mg/dL (01-13-20 @ 12:27)  Glucose, Serum: 229 mg/dL (01-13-20 @ 02:53)      CAPILLARY BLOOD GLUCOSE  POCT Blood Glucose.: 162 mg/dL (13 Jan 2020 12:17)  POCT Blood Glucose.: 91 mg/dL (13 Jan 2020 08:42)  POCT Blood Glucose.: 201 mg/dL (13 Jan 2020 02:31)  POCT Blood Glucose.: 140 mg/dL (12 Jan 2020 22:43)  POCT Blood Glucose.: 130 mg/dL (12 Jan 2020 18:53)      Prealbumin, Serum: 13 mg/dL (01-07-20 @ 02:35)  Prealbumin, Serum: 14 mg/dL (01-06-20 @ 07:40)  Prealbumin, Serum: 16 mg/dL (01-01-20 @ 02:48)  Prealbumin, Serum: 15 mg/dL (12-31-19 @ 03:11)     Triglycerides, Serum: 50 mg/dL (01.07.20 @ 01:05)  Triglycerides, Serum: 48 mg/dL (01.06.20 @ 02:44)  Triglycerides, Serum: 56 mg/dL (01.01.20 @ 00:42)

## 2020-01-13 NOTE — PROGRESS NOTE ADULT - ASSESSMENT
73 y/o M presenting with septic shock and high grade SBO s/p exploratory laparotomy, lysis of adhesions, decompression of bowel via enterotomy w/ primary repair, and Abthera VAC placement on 12/6; s/p take down of Abthera, washout and re-application of the Abthera vac on 12/8. Now s/p SBR and end ileostomy on 12/10 acute respiratory distress now improving, Pneumothorax, hyperglycemia, delirium. Now still with persistent pneumothorax when chest pigtail clamped.   12/23 VSS on room air recommending IR drainage of left chest  12/24 No evidence of air leak to left chest tube. Tube clamped. Repeat chest cxr in 4 hours. Anticipate chest tube removal if lung remains expanded. Drainage of loculated left effusion discussed with IR. IR to reconsult for drainage once initial tube removed. Discussed plan with Dr Mayes and IR Fellow  12/24  On NC ,    VSSchest xray sm lt pl eff, left chest tube dc'd.  12/26    2l NC   co  sob,  lt side diminshed  12/27 VSS, CXR with unchanged loculated left pleural effusion- IR consulted for pigtail placement, states effusion can be drained via thoracentesis, pigtail placement not indicated at this time - Dr. Mayes to speak with IR attending.   12/30 Patient with persistent PTX  and left pleural effusion now for IR drainage with Dr Elkins.   12/31 HD stable, L PTC w/ high output, serosanguinous drainage, 990 ml overnight/ 2200 in 24 hrs. Tolerating 2L NC supplemental O2, Cyto pending, continue monitor drainage output.   1/1: LPTC still with High output-400/24h. Continue with pigtail cath drain.  1/ 2     lt pigtail draining  on lws  1/3 HD stable, left pigtail catheter continues to drain. Incentive spirometer encouraged, OOB and mobility, continue pulmonary toileting. Maintain chest tube to suction and monitor output.   1/4: Chest tube milked to prevent clogging of tube, Continues to drain with high out put.  1/5 remains with high out put 170/700. Continue drainage  1/6  persistent drainage from lt pl tube  1/7 Left pigtail 150/450. Daily CXR. Planning for pleurex cath placement on Friday 1/10 w/ Dr. Mayes  1/8        Lt pleural tube   persistent draining    1/9 preop for pleurX placement tomorrow. CT clamped. NPO after MN, type and cross x2.   1/10 s/p VATS L pleurx cath placement   1/11 HD stable, no resp distress, maintain L pleurex to suction overnight, CXR in AM.   1/12 pleurex waterseal  1/13   RRT  called for respiratory distress   plx to pleurvac   back to lws   min drainage  no a/l

## 2020-01-13 NOTE — PROGRESS NOTE ADULT - ASSESSMENT
75 y/o male presenting with high grade SBO s/p exploratory laparotomy, lysis of adhesions, decompression of bowel via enterotomy w/ primary repair, & Abthera VAC placement (12/06/2019); RTOR for re-exploration w/ Abthera VAC replacement (12/08/2019) to allow for demarcation of ischemic small bowel; RTOR for re-exploration, small bowel resection of 150 cm (150 cm remaining), ileocecetomy, end ileostomy, mucous fistula, and abdominal closure (12/10/2019); hospital course complicated by SVT, short bowel syndrome requiring repletions w/ IV fluids, malnutrition requiring TPN, intermittent episodes of hypotension, spontaneous left pneumothorax s/p pigtail catheter (12/15/2019-12/24/2019), left pleural effusion s/p pigtail catheter w/ IR (12/30/2019), dysphagia, and oral HSV lesions    PLAN:    Neuro: no acute issues  - Monitor mental status  - Pain control as needed with acetaminophen    Resp: COPD, spontaneous left pneumothorax s/p pigtail catheter, left pleural effusion s/p pigtail catheter w/ IR (12/30). RRT for hypoxic respiratory distress  - COntinue BiPAP and wean as tolerated, will keep Pleurx cathter to suction for now  - S/p 20mg. of IV lasix on the floor during RRT.   - Monitor pulse oximeter  - Out of bed to chair, ambulate as tolerated, and incentive spirometry to prevent atelectasis  - Spontaneous left pneumothorax s/p pigtail catheter from 12/15/2019-12/24/2019; left pleural effusion s/p pigtail catheter w/ IR placed on 12/30/2019, will monitor output  - Pulmicort and Duoneb for COPD  - Will need outpatient follow-up with a pulmonologist for his COPD as patient does not currently have one    CV: SVT  - Monitor vital signs    GI: s/p exploratory laparotomy, [place], decompression of bowel via enterotomy w/ primary repair, & Abthera VAC placement (12/06/2019); re-exploration w/ Abthera VAC replacement (12/08/2019); re-exploration, small bowel resection of 150 cm (150 cm remaining), ileocecetomy, end ileostomy, mucous fistula, and abdominal closure (12/10/2019); short bowel syndrome, malnutrition, dysphagia  - Dysphagia 1 pureed w/ nectar consistency fluid with TPN, currently undergoing calorie count to determine if TPN can be weanedsaline  - Continue Lomotil, tincture of opium, loperamide, and octreotide for short bowel syndrome  - Protonix to decrease gastric secretions    Renal: no acute issues  - Monitor I&Os  - Monitor electrolytes and replete as necessary    Heme: anemia likely secondary to malnutrition / malabsorption  - Monitor CBC and coags  - Follow up anemia labs  - Lovenox for VTE prophylaxis    ID: oral HSV lesions (s/p acyclovir 12/23/2019-01/02/2020)  - Monitor for clinical evidence of active infection    Endo: no acute issues  - Monitor glucose before meals & at bedtime while on TPN    Disposition:  - Full code  - Will remain in SICU    - Buster Bishop PA-C

## 2020-01-13 NOTE — PROGRESS NOTE ADULT - PROBLEM SELECTOR PLAN 1
s/p ex lap, MIRYAM, closure of enterotomy, abthera vac placement  s/p washout  s/p PICC line placement . On TPN

## 2020-01-13 NOTE — CHART NOTE - NSCHARTNOTEFT_GEN_A_CORE
Nutrition Follow Up Note  Patient seen for: TPN follow up    Source: TPN rounds, comprehensive chart review    Chart reviewed, events noted. "75 y/o M presenting with septic shock and high grade SBO s/p exploratory laparotomy, lysis of adhesions, decompression of bowel via enterotomy w/ primary repair, and Abthera VAC placement on 12/6; s/p take down of Abthera, washout and re-application of the Abthera vac on 12/8. Now s/p SBR and end ileostomy on 12/10." Pt s/p RRT on 1/13 for respiratory distress; continues on BiPAP as tolerated. Pleurx catheter remains in place for suction. To note, pt s/p MBS on 1/11 with recommendations for pt to continue on mechanical soft diet with thin liquids. Pt s/p calorie count 1/3-1/5; pt had been meeting 100% of calorie needs and 55-65% of lowest estimated protein needs.     Diet: Mechanical Soft, Ensure Enlive three times daily.     Parenteral Nutrition: Full-strength TPN (ordered, presumed to start evening of 1/13): 1.2L infusing at 50ml/hr (120Gm amino acids; 210Gm dextrose; 50Gm SMOF lipids). To provide 1694cal/day (31cal/Kg and 2.2Gm protein/Kg per dosing wt 54.2Kg); with 10ml MVI, 3ml MTE-5. Insulin removed from TPN. Per discussion during TPN rounds, 80mEqs of NaCl to be removed from presumed TPN bag.     Non-Protein Calories: 1214 gregorio/day (22 gregorio/Kg)  Dextrose Infusion Rate: 2.7 mg/Kg/min  Lipid Infusion Rate: 0.92 Gm/Kg/day; 0.08 Gm/Kg/hr     Ileostomy output (per nursing flow sheet):   (1/13/20): 150ml  (1/12/20): 1400ml  (1/11/20): 3050ml     Daily   % Weight Change    Pertinent Medications: MEDICATIONS  (STANDING):  acetaminophen   Tablet .. 975 milliGRAM(s) Oral every 6 hours  albuterol/ipratropium for Nebulization. 3 milliLiter(s) Nebulizer every 6 hours  buDESOnide    Inhalation Suspension 0.5 milliGRAM(s) Inhalation every 12 hours  chlorhexidine 2% Cloths 1 Application(s) Topical <User Schedule>  cholestyramine Powder (Sugar-Free) 4 Gram(s) Oral two times a day  diphenoxylate/atropine 2 Tablet(s) Oral <User Schedule>  enoxaparin Injectable 40 milliGRAM(s) SubCutaneous daily  fat emulsion (Fish Oil and Plant Based) 20% Infusion 20.8 mL/Hr (20.8 mL/Hr) IV Continuous <Continuous>  insulin lispro (HumaLOG) corrective regimen sliding scale   SubCutaneous three times a day before meals  insulin lispro (HumaLOG) corrective regimen sliding scale   SubCutaneous at bedtime  loperamide 16 milliGRAM(s) Oral <User Schedule>  octreotide  Injectable 100 MICROGram(s) SubCutaneous three times a day  opium Tincture 6 milliGRAM(s) Oral <User Schedule>  pantoprazole    Tablet 40 milliGRAM(s) Oral before breakfast  Parenteral Nutrition - Adult 1 Each (75 mL/Hr) TPN Continuous <Continuous>  Parenteral Nutrition - Adult 1 Each (50 mL/Hr) TPN Continuous <Continuous>  piperacillin/tazobactam IVPB.. 3.375 Gram(s) IV Intermittent every 8 hours  sodium chloride 0.9%. 1000 milliLiter(s) (10 mL/Hr) IV Continuous <Continuous>    MEDICATIONS  (PRN):  ondansetron Injectable 4 milliGRAM(s) IV Push every 6 hours PRN Nausea and/or Vomiting    Pertinent Labs: 01-13 @ 12:27: Na 142, BUN 18, Cr 0.51, <H>, K+ 3.9, Phos 3.6, Mg 1.7, Alk Phos 67, ALT/SGPT 10, AST/SGOT 15, HbA1c --  01-13 @ 02:53: Na 138, BUN 15, Cr 0.44<L>, <H>, K+ 4.1, Phos 3.6, Mg 1.8, Alk Phos 77, ALT/SGPT 15, AST/SGOT 17, HbA1c --    Finger Sticks:  POCT Blood Glucose.: 162 mg/dL (01-13 @ 12:17)  POCT Blood Glucose.: 91 mg/dL (01-13 @ 08:42)  POCT Blood Glucose.: 201 mg/dL (01-13 @ 02:31)  POCT Blood Glucose.: 140 mg/dL (01-12 @ 22:43)  POCT Blood Glucose.: 130 mg/dL (01-12 @ 18:53)      Skin per nursing documentation: surgical incision midline abdomen  Edema: 1+ left ankle;  right ankle;  left foot;  right foot    Estimated Needs:   [x] no change since previous assessment  [ ] recalculated:     Previous Nutrition Diagnosis: Severe malnutrition  Nutrition Diagnosis is: ongoing, being addressed with TPN, diet advancement (as able), and oral supplements (as able)    Interventions:     Recommend  1) Defer diet/consistency to medical team, SLP.   2) TPN as per TPN team, nutrition assessment.   3) As able, continue Mighty Shakes twice daily, Ensure Pudding twice daily.     Monitoring and Evaluation:     Continue to monitor Nutritional intake, Tolerance to diet prescription, weights, labs, skin integrity    RD remains available upon request and will follow up per protocol

## 2020-01-13 NOTE — PROGRESS NOTE ADULT - ASSESSMENT
A/P: 74 year old male w/ PMH of COPD and EtOH dependence with PSH/o appy, prostate surgery, & spine surgery  septic shock and high grade SBO s/p exploratory laparotomy, lysis of adhesions, decompression of bowel via enterotomy w/ primary repair, and Abthera VAC placement on 12/6; s/p take down of Abthera, washout and re-application of the Abthera vac on 12/8. Now s/p SBR and end ileostomy on 12/10.   TPN consulted to assist w/ management of pt's nutrition in pt w/ prolonged hospital course now tolerating diet but has high Ileostomy output.  Pt s/p Lt Chest Pigtail for effusion in IR with persistent high output on 1/10/2020 pt had Video assisted thorascopic placement of Left PleurX catheter      RRT called on pt experiencing resp distress  Pt found to have Respiratory Acidosis w/compensatory Metabolic Alkalosis- improving  Leukocytosis- Vanco/ Zosyn, possible PNA with close monitoring for renal impairment   TPN at full strength in pt w/ severe Protein-Calorie Malnutrition      - high ostomy output- pt eating more regularly- decreasing output -             ICU repleting w/ 0.5:1 IVF q6h      - Diet advanced to Soft Mechanical w/ Protein Supplements, MBS results noted      - Strict Intake and Output- Continue TPN & octreotide, lomotil, tincture of opium, & imodium             Decreased TPN volume to 1200mL    TPN at GOAL:  120 grams amino acids  210 grams dextrose   50 grams SMOF /lipid   Micronutrients: 10ml MVI, 3ml MTE-5    BLE Edema- Decrease TPN volume & total Sodium in TPN (NaCl decreased from 80mEq to 0mEq  HypoNa  & Elevated K  improving, will maintain KCl of 40 mEq KCl in TPN          possibly due to Octreotide  HypoPhos resolving, continue NaPhos of 45mMol   Fecal fat testing- specimen collection- 72hr fecal studies- pending        electrolytes & fat content to be assessed -see what pt is absorbing        to be better able to assess ongoing nutritional needs in pt with high ostomy output that is concerning             for Short Gut Syndrome   Hyperglycemia- will continue to monitor- improved while pt NPO      Fingersticks & ISS coverage - to be ordered by primary team  RUE swelling -resolved  PICC w/dedicated port for only TPN - maintenance as per protocol  Weights three times a week  Monitor BMP, Mg, Ionized Ca, Phosphorus daily  Continue to monitor Triglycerides and Pre-albumin weekly  Continue as per Surgery, will follow with you, D/w primary team    Andreina Hubbard PA-C  TPN team, pager [phone]  D/w Ana M Siegel

## 2020-01-13 NOTE — PROGRESS NOTE ADULT - ASSESSMENT
74 y/o M presenting with septic shock and high grade SBO s/p exploratory laparotomy, lysis of adhesions, decompression of bowel via enterotomy w/ primary repair, and Abthera VAC placement on 12/6; s/p take down of Abthera, washout and re-application of the Abthera vac on 12/8. 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 s/p L chest tube placement by IR 12/30 for pleural effusion now s/p VATS, with chest tube insertion for drainage of pleural effusion. RRT called 1/13 AM for hypoxia, patient transferred back to SICU.    Recommendations  - pain control   - continue dysphagia diet when patient is stable off BIPAP  - MBS 1/11, per s/s: Mechanical soft diet with thin liquids via SMALL SINGLE SIPS  MD/team Please enter the following as notes to RN: 1) Full supervision with meals, 2) Crush meds or provide via alternate source, as needed, 3) Small single bites and sips at slow rate, 4) Encourage successive swallows for oral and pharyngeal clearance, 5) Alternate food and liquids to aid in oral and pharyngeal clearance, 6) Aspiration precautions. Monitor for s/s aspiration/laryngeal penetration. If noted:  D/C p.o. intake, provide non-oral nutrition/hydration/meds  -monitor ostomy output, c/w imodium/lomotil/opium/cholestyramine  - F/U am CXR  - OOB as tolerated with PT  -care per SICU team  - cont TPN  - F/U Derm consult regarding back rash 1/10: no creams needed, "If no improvement in several days or rash becomes symptomatic, ok to apply triamcinolone 0.1% cream BID to affected area PRN for itch"  - f/u CTS recs    RED SURGERY  [id number]

## 2020-01-13 NOTE — RAPID RESPONSE TEAM SUMMARY - NSADDTLFINDINGSRRT_GEN_ALL_CORE
(+) hypoxia spo2 60's on 100NRB sbp 200's hrt rate 100's RR labored.   Patient diaphoretic but remained alert and responsive   No breath sounds to left chest wall
HTN to 200/75, resp distress O2 sat in 80's, afebrile. CT placed back on LCWS, O2 sat improved to mid 90s. Labetalol 5mg IV given, SBP improved to 120-130s. whiteout L chest on CXR. Placed on BIPAP.

## 2020-01-13 NOTE — PROGRESS NOTE ADULT - ATTENDING COMMENTS
Pt evaluated  Known to me from recent prolong stay in ICU  S/p lt VATS and pleurax catheter placement on Friday, S/P RRT on floor for resp distress hypoxemia. CT placed to suction received Lasix  CXR b/l effusion post op haziness throughout lt lung field  Plan: placed on BiPAP with improvement in hypoxemia resp distress, pt tolerating well  Antimotility agents, ileostomy output decreased to 900 cc/24 hrs, replacement with .5 cc per cc output with NS, overall fluid balance 200cc +  TPN  Zosyn, monitor worsening leucocytosis, recent cultures neg  On pharmacologic DVT prophylaxis  Mobilization

## 2020-01-13 NOTE — RAPID RESPONSE TEAM SUMMARY - NSMEDICATIONSRRT_GEN_ALL_CORE
Duo nebs x 2   Left chest tube placed back to suction   Lasix 40 x 1 (possible flash pulm edema)  Labetalol 5 mg x 1
5mg IV labetalol  20mg IV lasix

## 2020-01-13 NOTE — PROGRESS NOTE ADULT - PROBLEM SELECTOR PLAN 1
s/p VATS, L pleurex cath placemen  placed   back to lws    need daily chest xrays while attached to lws  will cap pleurX  when tolerating water seal   continue monitor plx output

## 2020-01-13 NOTE — PROGRESS NOTE ADULT - SUBJECTIVE AND OBJECTIVE BOX
GENERAL SURGERY PROGRESS NOTE    SUBJECTIVE  24 hour events:  pleurex cath placed on water seal 1/12 AM from [facility], CXR unchanged from previous  during the day, vital signs remained stable on o2 nasal cannula  ~2:30AM patient woke up suddenly with difficulty breathing, RRT called for hypoxia  CXR new L opacities likely edema  CT placed backed on LCWS, patient's o2 sat improved to 90s  placed on bipap  labs sent  5mg labetalol IV given, improved BP from 200/75 to -130s  20mg IV lasix given  duonebs x 2  transferred to SICU    this AM on rounds, on bipap, wants to eat, notes feeling better than earlier this morning    OBJECTIVE    PHYSICAL EXAM  General: Appears well, NAD  CHEST: BIPAP 12/7 on 40%, pleurex to [facility]  CV: appears well perfused  Abdomen: soft, nontender, nondistended, midline incision c/d/i, ostomy +/+  Extremities: Grossly symmetric    T(C): 37 (01-13-20 @ 05:40), Max: 37.1 (01-12-20 @ 21:07)  HR: 66 (01-13-20 @ 06:51) (63 - 91)  BP: 88/55 (01-13-20 @ 06:00) (88/55 - 142/72)  RR: 31 (01-13-20 @ 06:00) (18 - 31)  SpO2: 100% (01-13-20 @ 06:51) (94% - 100%)    01-12-20 @ 07:01  -  01-13-20 @ 07:00  --------------------------------------------------------  IN: 2335 mL / OUT: 2135 mL / NET: 200 mL        MEDICATIONS  acetaminophen   Tablet .. 975 milliGRAM(s) Oral every 6 hours  albuterol/ipratropium for Nebulization. 3 milliLiter(s) Nebulizer every 6 hours  buDESOnide    Inhalation Suspension 0.5 milliGRAM(s) Inhalation every 12 hours  chlorhexidine 2% Cloths 1 Application(s) Topical <User Schedule>  cholestyramine Powder (Sugar-Free) 4 Gram(s) Oral two times a day  diphenoxylate/atropine 2 Tablet(s) Oral <User Schedule>  enoxaparin Injectable 40 milliGRAM(s) SubCutaneous daily  fat emulsion (Fish Oil and Plant Based) 20% Infusion 20.8 mL/Hr IV Continuous <Continuous>  insulin lispro (HumaLOG) corrective regimen sliding scale   SubCutaneous three times a day before meals  insulin lispro (HumaLOG) corrective regimen sliding scale   SubCutaneous at bedtime  labetalol Injectable 10 milliGRAM(s) IV Push once  loperamide 16 milliGRAM(s) Oral <User Schedule>  octreotide  Injectable 100 MICROGram(s) SubCutaneous three times a day  ondansetron Injectable 4 milliGRAM(s) IV Push every 6 hours PRN  opium Tincture 6 milliGRAM(s) Oral <User Schedule>  pantoprazole    Tablet 40 milliGRAM(s) Oral before breakfast  Parenteral Nutrition - Adult 1 Each TPN Continuous <Continuous>  piperacillin/tazobactam IVPB.. 3.375 Gram(s) IV Intermittent every 8 hours  sodium chloride 0.9%. 1000 milliLiter(s) IV Continuous <Continuous>      LABS                        8.8    21.96 )-----------( 388      ( 13 Jan 2020 02:53 )             27.8     01-13    138  |  100  |  15  ----------------------------<  229<H>  4.1   |  27  |  0.44<L>    Ca    8.1<L>      13 Jan 2020 02:53  Phos  3.6     01-13  Mg     1.8     01-13    TPro  6.5  /  Alb  2.5<L>  /  TBili  0.8  /  DBili  x   /  AST  17  /  ALT  15  /  AlkPhos  77  01-13    PT/INR - ( 13 Jan 2020 03:06 )   PT: 14.1 sec;   INR: 1.22 ratio         PTT - ( 13 Jan 2020 03:06 )  PTT:48.6 sec      RADIOLOGY & ADDITIONAL STUDIES GENERAL SURGERY PROGRESS NOTE    SUBJECTIVE  24 hour events:  pleurex cath placed on water seal 1/12 AM from [facility], CXR unchanged from previous  during the day, vital signs remained stable on o2 nasal cannula  s/p PRBC x 1 for Hgb 6.5, 7.0 on repeat CBC during the day  ~2:30AM patient woke up suddenly with difficulty breathing, RRT called for hypoxia  CXR new L opacities likely edema  CT placed backed on LCWS, patient's o2 sat improved to 90s  placed on bipap  labs sent  5mg labetalol IV given, improved BP from 200/75 to -130s  20mg IV lasix given  duonebs x 2  transferred to SICU    this AM on rounds, on bipap, wants to eat, notes feeling better than earlier this morning    OBJECTIVE    PHYSICAL EXAM  General: Appears well, NAD  CHEST: BIPAP 12/7 on 40%, pleurex to LCWS  CV: appears well perfused  Abdomen: soft, nontender, nondistended, midline incision c/d/i, ostomy +/+  Extremities: Grossly symmetric    T(C): 37 (01-13-20 @ 05:40), Max: 37.1 (01-12-20 @ 21:07)  HR: 66 (01-13-20 @ 06:51) (63 - 91)  BP: 88/55 (01-13-20 @ 06:00) (88/55 - 142/72)  RR: 31 (01-13-20 @ 06:00) (18 - 31)  SpO2: 100% (01-13-20 @ 06:51) (94% - 100%)    01-12-20 @ 07:01  -  01-13-20 @ 07:00  --------------------------------------------------------  IN: 2335 mL / OUT: 2135 mL / NET: 200 mL        MEDICATIONS  acetaminophen   Tablet .. 975 milliGRAM(s) Oral every 6 hours  albuterol/ipratropium for Nebulization. 3 milliLiter(s) Nebulizer every 6 hours  buDESOnide    Inhalation Suspension 0.5 milliGRAM(s) Inhalation every 12 hours  chlorhexidine 2% Cloths 1 Application(s) Topical <User Schedule>  cholestyramine Powder (Sugar-Free) 4 Gram(s) Oral two times a day  diphenoxylate/atropine 2 Tablet(s) Oral <User Schedule>  enoxaparin Injectable 40 milliGRAM(s) SubCutaneous daily  fat emulsion (Fish Oil and Plant Based) 20% Infusion 20.8 mL/Hr IV Continuous <Continuous>  insulin lispro (HumaLOG) corrective regimen sliding scale   SubCutaneous three times a day before meals  insulin lispro (HumaLOG) corrective regimen sliding scale   SubCutaneous at bedtime  labetalol Injectable 10 milliGRAM(s) IV Push once  loperamide 16 milliGRAM(s) Oral <User Schedule>  octreotide  Injectable 100 MICROGram(s) SubCutaneous three times a day  ondansetron Injectable 4 milliGRAM(s) IV Push every 6 hours PRN  opium Tincture 6 milliGRAM(s) Oral <User Schedule>  pantoprazole    Tablet 40 milliGRAM(s) Oral before breakfast  Parenteral Nutrition - Adult 1 Each TPN Continuous <Continuous>  piperacillin/tazobactam IVPB.. 3.375 Gram(s) IV Intermittent every 8 hours  sodium chloride 0.9%. 1000 milliLiter(s) IV Continuous <Continuous>      LABS                        8.8    21.96 )-----------( 388      ( 13 Jan 2020 02:53 )             27.8     01-13    138  |  100  |  15  ----------------------------<  229<H>  4.1   |  27  |  0.44<L>    Ca    8.1<L>      13 Jan 2020 02:53  Phos  3.6     01-13  Mg     1.8     01-13    TPro  6.5  /  Alb  2.5<L>  /  TBili  0.8  /  DBili  x   /  AST  17  /  ALT  15  /  AlkPhos  77  01-13    PT/INR - ( 13 Jan 2020 03:06 )   PT: 14.1 sec;   INR: 1.22 ratio         PTT - ( 13 Jan 2020 03:06 )  PTT:48.6 sec      RADIOLOGY & ADDITIONAL STUDIES

## 2020-01-14 ENCOUNTER — TRANSCRIPTION ENCOUNTER (OUTPATIENT)
Age: 75
End: 2020-01-14

## 2020-01-14 LAB
ALBUMIN SERPL ELPH-MCNC: 2.1 G/DL — LOW (ref 3.3–5)
ALP SERPL-CCNC: 58 U/L — SIGNIFICANT CHANGE UP (ref 40–120)
ALT FLD-CCNC: 12 U/L — SIGNIFICANT CHANGE UP (ref 10–45)
ANION GAP SERPL CALC-SCNC: 6 MMOL/L — SIGNIFICANT CHANGE UP (ref 5–17)
AST SERPL-CCNC: 14 U/L — SIGNIFICANT CHANGE UP (ref 10–40)
BILIRUB DIRECT SERPL-MCNC: 0.3 MG/DL — HIGH (ref 0–0.2)
BILIRUB INDIRECT FLD-MCNC: 0.3 MG/DL — SIGNIFICANT CHANGE UP (ref 0.2–1)
BILIRUB SERPL-MCNC: 0.6 MG/DL — SIGNIFICANT CHANGE UP (ref 0.2–1.2)
BUN SERPL-MCNC: 21 MG/DL — SIGNIFICANT CHANGE UP (ref 7–23)
CALCIUM SERPL-MCNC: 7.8 MG/DL — LOW (ref 8.4–10.5)
CHLORIDE SERPL-SCNC: 101 MMOL/L — SIGNIFICANT CHANGE UP (ref 96–108)
CO2 SERPL-SCNC: 33 MMOL/L — HIGH (ref 22–31)
CREAT SERPL-MCNC: 0.53 MG/DL — SIGNIFICANT CHANGE UP (ref 0.5–1.3)
FUNGITELL: 241 PG/ML — HIGH
GAS PNL BLDA: SIGNIFICANT CHANGE UP
GLUCOSE BLDC GLUCOMTR-MCNC: 134 MG/DL — HIGH (ref 70–99)
GLUCOSE BLDC GLUCOMTR-MCNC: 151 MG/DL — HIGH (ref 70–99)
GLUCOSE BLDC GLUCOMTR-MCNC: 155 MG/DL — HIGH (ref 70–99)
GLUCOSE BLDC GLUCOMTR-MCNC: 169 MG/DL — HIGH (ref 70–99)
GLUCOSE SERPL-MCNC: 156 MG/DL — HIGH (ref 70–99)
HCT VFR BLD CALC: 20.2 % — CRITICAL LOW (ref 39–50)
HCT VFR BLD CALC: 20.4 % — CRITICAL LOW (ref 39–50)
HCT VFR BLD CALC: 26.2 % — LOW (ref 39–50)
HGB BLD-MCNC: 6.2 G/DL — CRITICAL LOW (ref 13–17)
HGB BLD-MCNC: 6.5 G/DL — CRITICAL LOW (ref 13–17)
HGB BLD-MCNC: 8.1 G/DL — LOW (ref 13–17)
MAGNESIUM SERPL-MCNC: 1.7 MG/DL — SIGNIFICANT CHANGE UP (ref 1.6–2.6)
MCHC RBC-ENTMCNC: 29.9 PG — SIGNIFICANT CHANGE UP (ref 27–34)
MCHC RBC-ENTMCNC: 30 PG — SIGNIFICANT CHANGE UP (ref 27–34)
MCHC RBC-ENTMCNC: 30.4 GM/DL — LOW (ref 32–36)
MCHC RBC-ENTMCNC: 30.9 GM/DL — LOW (ref 32–36)
MCHC RBC-ENTMCNC: 31.4 PG — SIGNIFICANT CHANGE UP (ref 27–34)
MCHC RBC-ENTMCNC: 32.2 GM/DL — SIGNIFICANT CHANGE UP (ref 32–36)
MCV RBC AUTO: 96.7 FL — SIGNIFICANT CHANGE UP (ref 80–100)
MCV RBC AUTO: 97.6 FL — SIGNIFICANT CHANGE UP (ref 80–100)
MCV RBC AUTO: 98.6 FL — SIGNIFICANT CHANGE UP (ref 80–100)
NRBC # BLD: 0 /100 WBCS — SIGNIFICANT CHANGE UP (ref 0–0)
OB PNL STL: NEGATIVE — SIGNIFICANT CHANGE UP
PHOSPHATE SERPL-MCNC: 3.7 MG/DL — SIGNIFICANT CHANGE UP (ref 2.5–4.5)
PLATELET # BLD AUTO: 244 K/UL — SIGNIFICANT CHANGE UP (ref 150–400)
PLATELET # BLD AUTO: 260 K/UL — SIGNIFICANT CHANGE UP (ref 150–400)
PLATELET # BLD AUTO: 292 K/UL — SIGNIFICANT CHANGE UP (ref 150–400)
POTASSIUM SERPL-MCNC: 3.8 MMOL/L — SIGNIFICANT CHANGE UP (ref 3.5–5.3)
POTASSIUM SERPL-SCNC: 3.8 MMOL/L — SIGNIFICANT CHANGE UP (ref 3.5–5.3)
PREALB SERPL-MCNC: 13 MG/DL — LOW (ref 20–40)
PROT SERPL-MCNC: 5.4 G/DL — LOW (ref 6–8.3)
RBC # BLD: 2.07 M/UL — LOW (ref 4.2–5.8)
RBC # BLD: 2.07 M/UL — LOW (ref 4.2–5.8)
RBC # BLD: 2.71 M/UL — LOW (ref 4.2–5.8)
RBC # FLD: 15.1 % — HIGH (ref 10.3–14.5)
RBC # FLD: 15.2 % — HIGH (ref 10.3–14.5)
RBC # FLD: 15.6 % — HIGH (ref 10.3–14.5)
SODIUM SERPL-SCNC: 140 MMOL/L — SIGNIFICANT CHANGE UP (ref 135–145)
TRIGL SERPL-MCNC: 60 MG/DL — SIGNIFICANT CHANGE UP (ref 10–149)
WBC # BLD: 12.74 K/UL — HIGH (ref 3.8–10.5)
WBC # BLD: 13.62 K/UL — HIGH (ref 3.8–10.5)
WBC # BLD: 14.19 K/UL — HIGH (ref 3.8–10.5)
WBC # FLD AUTO: 12.74 K/UL — HIGH (ref 3.8–10.5)
WBC # FLD AUTO: 13.62 K/UL — HIGH (ref 3.8–10.5)
WBC # FLD AUTO: 14.19 K/UL — HIGH (ref 3.8–10.5)

## 2020-01-14 PROCEDURE — 99232 SBSQ HOSP IP/OBS MODERATE 35: CPT

## 2020-01-14 PROCEDURE — 99233 SBSQ HOSP IP/OBS HIGH 50: CPT

## 2020-01-14 PROCEDURE — 71045 X-RAY EXAM CHEST 1 VIEW: CPT | Mod: 26

## 2020-01-14 PROCEDURE — 93010 ELECTROCARDIOGRAM REPORT: CPT

## 2020-01-14 PROCEDURE — 99232 SBSQ HOSP IP/OBS MODERATE 35: CPT | Mod: 25

## 2020-01-14 PROCEDURE — 99291 CRITICAL CARE FIRST HOUR: CPT

## 2020-01-14 PROCEDURE — 99223 1ST HOSP IP/OBS HIGH 75: CPT

## 2020-01-14 RX ORDER — MORPHINE 10 MG/ML
6 SOLUTION ORAL
Refills: 0 | Status: DISCONTINUED | OUTPATIENT
Start: 2020-01-14 | End: 2020-01-18

## 2020-01-14 RX ORDER — FUROSEMIDE 40 MG
20 TABLET ORAL ONCE
Refills: 0 | Status: COMPLETED | OUTPATIENT
Start: 2020-01-14 | End: 2020-01-14

## 2020-01-14 RX ORDER — DIPHENOXYLATE HCL/ATROPINE 2.5-.025MG
2 TABLET ORAL
Refills: 0 | Status: DISCONTINUED | OUTPATIENT
Start: 2020-01-14 | End: 2020-01-18

## 2020-01-14 RX ORDER — I.V. FAT EMULSION 20 G/100ML
12.5 EMULSION INTRAVENOUS
Qty: 30 | Refills: 0 | Status: DISCONTINUED | OUTPATIENT
Start: 2020-01-14 | End: 2020-01-15

## 2020-01-14 RX ORDER — ELECTROLYTE SOLUTION,INJ
1 VIAL (ML) INTRAVENOUS
Refills: 0 | Status: DISCONTINUED | OUTPATIENT
Start: 2020-01-14 | End: 2020-01-15

## 2020-01-14 RX ADMIN — SODIUM CHLORIDE 10 MILLILITER(S): 9 INJECTION INTRAMUSCULAR; INTRAVENOUS; SUBCUTANEOUS at 13:27

## 2020-01-14 RX ADMIN — ENOXAPARIN SODIUM 40 MILLIGRAM(S): 100 INJECTION SUBCUTANEOUS at 13:26

## 2020-01-14 RX ADMIN — Medication 975 MILLIGRAM(S): at 06:00

## 2020-01-14 RX ADMIN — Medication 2 TABLET(S): at 17:56

## 2020-01-14 RX ADMIN — Medication 0.5 MILLIGRAM(S): at 05:46

## 2020-01-14 RX ADMIN — OCTREOTIDE ACETATE 100 MICROGRAM(S): 200 INJECTION, SOLUTION INTRAVENOUS; SUBCUTANEOUS at 13:25

## 2020-01-14 RX ADMIN — CHOLESTYRAMINE 4 GRAM(S): 4 POWDER, FOR SUSPENSION ORAL at 23:25

## 2020-01-14 RX ADMIN — PANTOPRAZOLE SODIUM 40 MILLIGRAM(S): 20 TABLET, DELAYED RELEASE ORAL at 08:10

## 2020-01-14 RX ADMIN — Medication 1: at 17:54

## 2020-01-14 RX ADMIN — Medication 16 MILLIGRAM(S): at 23:26

## 2020-01-14 RX ADMIN — Medication 2 TABLET(S): at 23:32

## 2020-01-14 RX ADMIN — PIPERACILLIN AND TAZOBACTAM 25 GRAM(S): 4; .5 INJECTION, POWDER, LYOPHILIZED, FOR SOLUTION INTRAVENOUS at 23:25

## 2020-01-14 RX ADMIN — PIPERACILLIN AND TAZOBACTAM 25 GRAM(S): 4; .5 INJECTION, POWDER, LYOPHILIZED, FOR SOLUTION INTRAVENOUS at 13:25

## 2020-01-14 RX ADMIN — Medication 3 MILLILITER(S): at 05:46

## 2020-01-14 RX ADMIN — Medication 3 MILLILITER(S): at 23:03

## 2020-01-14 RX ADMIN — Medication 20 MILLIGRAM(S): at 13:25

## 2020-01-14 RX ADMIN — Medication 0.5 MILLIGRAM(S): at 18:12

## 2020-01-14 RX ADMIN — Medication 16 MILLIGRAM(S): at 13:26

## 2020-01-14 RX ADMIN — Medication 975 MILLIGRAM(S): at 13:27

## 2020-01-14 RX ADMIN — Medication 975 MILLIGRAM(S): at 23:57

## 2020-01-14 RX ADMIN — Medication 16 MILLIGRAM(S): at 17:57

## 2020-01-14 RX ADMIN — MORPHINE 6 MILLIGRAM(S): 10 SOLUTION ORAL at 13:25

## 2020-01-14 RX ADMIN — SODIUM CHLORIDE 10 MILLILITER(S): 9 INJECTION INTRAMUSCULAR; INTRAVENOUS; SUBCUTANEOUS at 00:00

## 2020-01-14 RX ADMIN — I.V. FAT EMULSION 12.5 ML/HR: 20 EMULSION INTRAVENOUS at 17:57

## 2020-01-14 RX ADMIN — MORPHINE 6 MILLIGRAM(S): 10 SOLUTION ORAL at 17:56

## 2020-01-14 RX ADMIN — Medication 975 MILLIGRAM(S): at 18:30

## 2020-01-14 RX ADMIN — PIPERACILLIN AND TAZOBACTAM 25 GRAM(S): 4; .5 INJECTION, POWDER, LYOPHILIZED, FOR SOLUTION INTRAVENOUS at 05:40

## 2020-01-14 RX ADMIN — OCTREOTIDE ACETATE 100 MICROGRAM(S): 200 INJECTION, SOLUTION INTRAVENOUS; SUBCUTANEOUS at 05:49

## 2020-01-14 RX ADMIN — Medication 16 MILLIGRAM(S): at 08:11

## 2020-01-14 RX ADMIN — Medication 975 MILLIGRAM(S): at 17:56

## 2020-01-14 RX ADMIN — Medication 975 MILLIGRAM(S): at 05:39

## 2020-01-14 RX ADMIN — Medication 975 MILLIGRAM(S): at 23:27

## 2020-01-14 RX ADMIN — CHOLESTYRAMINE 4 GRAM(S): 4 POWDER, FOR SUSPENSION ORAL at 08:11

## 2020-01-14 RX ADMIN — Medication 3 MILLILITER(S): at 11:32

## 2020-01-14 RX ADMIN — Medication 1 EACH: at 17:57

## 2020-01-14 RX ADMIN — Medication 975 MILLIGRAM(S): at 14:00

## 2020-01-14 RX ADMIN — MORPHINE 6 MILLIGRAM(S): 10 SOLUTION ORAL at 23:25

## 2020-01-14 RX ADMIN — OCTREOTIDE ACETATE 100 MICROGRAM(S): 200 INJECTION, SOLUTION INTRAVENOUS; SUBCUTANEOUS at 23:26

## 2020-01-14 RX ADMIN — Medication 1: at 08:11

## 2020-01-14 RX ADMIN — Medication 3 MILLILITER(S): at 18:12

## 2020-01-14 RX ADMIN — Medication 975 MILLIGRAM(S): at 00:00

## 2020-01-14 RX ADMIN — MORPHINE 6 MILLIGRAM(S): 10 SOLUTION ORAL at 08:10

## 2020-01-14 RX ADMIN — Medication 2 TABLET(S): at 13:37

## 2020-01-14 RX ADMIN — Medication 32 MILLIGRAM(S): at 23:24

## 2020-01-14 RX ADMIN — Medication 2 TABLET(S): at 08:10

## 2020-01-14 NOTE — PROGRESS NOTE ADULT - SUBJECTIVE AND OBJECTIVE BOX
Patient seen and examined. Overnight patient on BIPAP, Hct dropped to 20.4 today, getting 1 PRBC. patient feels improved, denies any n/v/d/f/c      OVERNIGHT EVENTS:    10-point review of systems completed and negative except as noted above.      OBJECTIVE    MEDICATIONS  acetaminophen   Tablet .. 975 milliGRAM(s) Oral every 6 hours  albuterol/ipratropium for Nebulization. 3 milliLiter(s) Nebulizer every 6 hours  buDESOnide    Inhalation Suspension 0.5 milliGRAM(s) Inhalation every 12 hours  chlorhexidine 2% Cloths 1 Application(s) Topical <User Schedule>  cholestyramine Powder (Sugar-Free) 4 Gram(s) Oral two times a day  diphenoxylate/atropine 2 Tablet(s) Oral <User Schedule>  enoxaparin Injectable 40 milliGRAM(s) SubCutaneous daily  fat emulsion (Fish Oil and Plant Based) 20% Infusion 20.8 mL/Hr IV Continuous <Continuous>  furosemide   Injectable 20 milliGRAM(s) IV Push once  insulin lispro (HumaLOG) corrective regimen sliding scale   SubCutaneous three times a day before meals  insulin lispro (HumaLOG) corrective regimen sliding scale   SubCutaneous at bedtime  loperamide 16 milliGRAM(s) Oral <User Schedule>  octreotide  Injectable 100 MICROGram(s) SubCutaneous three times a day  ondansetron Injectable 4 milliGRAM(s) IV Push every 6 hours PRN  opium Tincture 6 milliGRAM(s) Oral <User Schedule>  pantoprazole    Tablet 40 milliGRAM(s) Oral before breakfast  Parenteral Nutrition - Adult 1 Each TPN Continuous <Continuous>  piperacillin/tazobactam IVPB.. 3.375 Gram(s) IV Intermittent every 8 hours  sodium chloride 0.9%. 1000 milliLiter(s) IV Continuous <Continuous>      PHYSICAL EXAM  T(C): 36.7 (20 @ 07:00), Max: 37.5 (20 @ 15:00)  HR: 100 (20 @ 09:44) (70 - 100)  BP: 116/56 (20 @ 08:00) (89/54 - 140/63)  RR: 14 (20 @ 08:00) (14 - 29)  SpO2: 97% (20 @ 09:44) (91% - 100%)    20 @ 07:  -  20 @ 07:00  --------------------------------------------------------  IN: 2964.6 mL / OUT: 2040 mL / NET: 924.6 mL    20 @ 07:20 @ 11:29  --------------------------------------------------------  IN: 150 mL / OUT: 100 mL / NET: 50 mL        General: Appears well, NAD  Neuro: AAOx3  CHEST: Clear to auscultation bilaterally  CV: Regular rate and rhythm  Abdomen: soft, nontender, nondistended, no rebound or guarding, chest tube in place, draining to wall suction  Extremities: Grossly symmetric    LABS                        6.2    12.74 )-----------( 244      ( 2020 04:17 )             20.4     01-14    140  |  101  |  21  ----------------------------<  156<H>  3.8   |  33<H>  |  0.53    Ca    7.8<L>      2020 03:31  Phos  3.7       Mg     1.7         TPro  5.4<L>  /  Alb  2.1<L>  /  TBili  0.6  /  DBili  0.3<H>  /  AST  14  /  ALT  12  /  AlkPhos  58  -14    PT/INR - ( 2020 03:06 )   PT: 14.1 sec;   INR: 1.22 ratio         PTT - ( 2020 03:06 )  PTT:48.6 sec  Urinalysis Basic - ( 2020 12:41 )    Color: brown / Appearance: Turbid / S.016 / pH: x  Gluc: x / Ketone: Negative  / Bili: Negative / Urobili: Negative   Blood: x / Protein: 30 mg/dL / Nitrite: Negative   Leuk Esterase: Negative / RBC: >50 /hpf / WBC 6 /HPF   Sq Epi: x / Non Sq Epi: 0 /hpf / Bacteria: Few        RADIOLOGY & ADDITIONAL STUDIES  < from: Xray Chest 1 View- PORTABLE-Urgent (20 @ 04:11) >  Impression:    The heart is normal in size. Left pleural effusion. Left lower lobe pneumonia and/or atelectasis. Small right pleural effusion cannot be ruled out. A PICC line is seen on the right and the tip is in superior vena cava. No pneumothorax. Calcified aortic knob.        < end of copied text >

## 2020-01-14 NOTE — PROGRESS NOTE ADULT - PROBLEM SELECTOR PLAN 1
s/p 1/10 Left VATS pleurx catheter placement  Maintain left pleurx to LWS  Strict I & O's- monitor drainage from pleurx  Daily CXR  will cap pleurX when tolerating water seal   Continue management as per primary team

## 2020-01-14 NOTE — PROGRESS NOTE ADULT - ASSESSMENT
74 y/o M presenting with septic shock and high grade SBO s/p exploratory laparotomy, lysis of adhesions, decompression of bowel via enterotomy w/ primary repair, and Abthera VAC placement on 12/6; s/p take down of Abthera, washout and re-application of the Abthera vac on 12/8. 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 s/p L chest tube placement by IR 12/30 for pleural effusion now s/p VATS, with chest tube insertion for drainage of pleural effusion. RRT called 1/13 AM for hypoxia, patient transferred back to SICU.    Recommendations  - pain control   - continue dysphagia diet when patient is stable off BIPAP  - F/U am CXR  - OOB as tolerated with PT  - care per SICU team  - cont TPN  - F/U Derm consult regarding back rash 1/10: no creams needed, "If no improvement in several days or rash becomes symptomatic, ok to apply triamcinolone 0.1% cream BID to affected area PRN for itch"  - f/u CTS recs  - Appreciate excellent SICU care    RED SURGERY  [id number] 76 y/o M presenting with septic shock and high grade SBO s/p exploratory laparotomy, lysis of adhesions, decompression of bowel via enterotomy w/ primary repair, and Abthera VAC placement on 12/6; s/p take down of Abthera, washout and re-application of the Abthera vac on 12/8. 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 s/p L chest tube placement by IR 12/30 for pleural effusion now s/p VATS, with chest tube insertion for drainage of pleural effusion. RRT called 1/13 AM for hypoxia, patient transferred back to SICU.    Recommendations  - pain control   - continue dysphagia diet when patient is stable off BIPAP, monitor ostomy output  - F/U am CXR  - OOB as tolerated with PT  - care per SICU team  - cont TPN  - F/U Derm consult regarding back rash 1/10: no creams needed, "If no improvement in several days or rash becomes symptomatic, ok to apply triamcinolone 0.1% cream BID to affected area PRN for itch"  - f/u CTS recs  - Appreciate excellent SICU care    RED SURGERY  [id number]

## 2020-01-14 NOTE — PROGRESS NOTE ADULT - ATTENDING COMMENTS
Pt evaluated  Known to me from recent prolong stay in ICU  S/p lt VATS and pleurax catheter placement on Friday, S/P RRT on floor for resp distress hypoxemia. CT placed to suction received Lasix  CXR -bilateral pulmonary edema   Currently on 3 liters NC  -Bipap if worsens  Antimotility agents, ileostomy output decreased to 1000 cc/24 hrs, replacement with .5 cc per cc output with NS  TPN  Zosyn, monitor worsening leucocytosis, recent cultures neg  On pharmacologic DVT prophylaxis  Mobilization

## 2020-01-14 NOTE — PROGRESS NOTE ADULT - ASSESSMENT
75 y/o M presenting with septic shock and high grade SBO s/p exploratory laparotomy, lysis of adhesions, decompression of bowel via enterotomy w/ primary repair, and Abthera VAC placement on 12/6; s/p take down of Abthera, washout and re-application of the Abthera vac on 12/8. Now s/p SBR and end ileostomy on 12/10 acute respiratory distress now improving, Pneumothorax, hyperglycemia, delirium. Now still with persistent pneumothorax when chest pigtail clamped.   12/23 VSS on room air recommending IR drainage of left chest  12/24 No evidence of air leak to left chest tube. Tube clamped. Repeat chest cxr in 4 hours. Anticipate chest tube removal if lung remains expanded. Drainage of loculated left effusion discussed with IR. IR to reconsult for drainage once initial tube removed. Discussed plan with Dr Mayes and IR Fellow  12/24  On NC ,    VSSchest xray sm lt pl eff, left chest tube dc'd.  12/26    2l NC   co  sob,  lt side diminshed  12/27 VSS, CXR with unchanged loculated left pleural effusion- IR consulted for pigtail placement, states effusion can be drained via thoracentesis, pigtail placement not indicated at this time - Dr. Mayes to speak with IR attending.   12/30 Patient with persistent PTX  and left pleural effusion now for IR drainage with Dr Elkins.   12/31 HD stable, L PTC w/ high output, serosanguinous drainage, 990 ml overnight/ 2200 in 24 hrs. Tolerating 2L NC supplemental O2, Cyto pending, continue monitor drainage output.   1/1: LPTC still with High output-400/24h. Continue with pigtail cath drain.  1/ 2     lt pigtail draining  on lws  1/3 HD stable, left pigtail catheter continues to drain. Incentive spirometer encouraged, OOB and mobility, continue pulmonary toileting. Maintain chest tube to suction and monitor output.   1/4: Chest tube milked to prevent clogging of tube, Continues to drain with high out put.  1/5 remains with high out put 170/700. Continue drainage  1/6  persistent drainage from lt pl tube  1/7 Left pigtail 150/450. Daily CXR. Planning for pleurex cath placement on Friday 1/10 w/ Dr. Mayes  1/8        Lt pleural tube   persistent draining    1/9 preop for pleurX placement tomorrow. CT clamped. NPO after MN, type and cross x2.   1/10 s/p VATS L pleurx cath placement   1/11 HD stable, no resp distress, maintain L pleurex to suction overnight, CXR in AM.   1/12 pleurex waterseal  1/13   RRT  called for respiratory distress   plx to pleurvac   back to lws   min drainage  no a/l  1/14 VSS, Left pleurx catheter minimal drainage 10ml/24h, CXR: small left pleural effusion with left pleurx catheter in place.

## 2020-01-14 NOTE — PROGRESS NOTE ADULT - SUBJECTIVE AND OBJECTIVE BOX
SICU DAILY PROGRESS NOTE    74y Male presented with abdominal pain, nausea, hematemesis, and poor PO intake for ~2-3 days. Labs significant for an CHI w/ Cr 2.74 and lactate of 9.4. He was also notably tachycardic to the 110s and hypotensive w/ SBP in the 70s. He was given 2 units of PRBCs and 2 L of LR in the ED due to concern for upper GI bleeding. Imaging revealed high grade SBO in the RLQ. Patient was taken to the OR emergently for an exploratory laparotomy, lysis of adhesions, decompression of bowel via enterotomy w/ primary repair, and Abthera VAC placement. Of note, the distal 50% of the bowel appeared dusky but viable. He required vasopressor support with phenylephrine and vasopressin infusions. He received 3000 mL of crystalloid w/ EBL of 10 mL and UOP of 25 mL. Patient was left intubated at the end of the case so SICU was consulted for hemodynamic monitoring. Taken back to the OR on 12/8 and underwent take down of Abthera, washout and re-application of the Abthera vac. Went back to OR on 12/10 night for colectomy. Post-op course complicated by short gut syndrome with high ileostomy output as well as left pleural effusion requiring IR guided pigtail catheter placement 12/30. Patient then had VATS on left side for PleurX catheter placement. 01/10.     SICU reconsulted for RRT on the floor for respiratory distress and oxygen desaturation. Patient placed on BiPAP to good effect and left pleurx catheter placed back to suction.       24 HOUR EVENTS:  - discontinued BiPAP  - pleurex put back to suction  - gave a dose of Vancomycin empirically  - H/h dropped 6/20, 1u PRBC given overnight    SUBJECTIVE/ROS:  [x] A ten-point review of systems was otherwise negative except as noted.  [ ] Due to altered mental status/intubation, subjective information were not able to be obtained from the patient. History was obtained, to the extent possible, from review of the chart and collateral sources of information.      NEURO  Exam: awake, alert, oriented  Meds: acetaminophen   Tablet .. 975 milliGRAM(s) Oral every 6 hours  ondansetron Injectable 4 milliGRAM(s) IV Push every 6 hours PRN Nausea and/or Vomiting  opium Tincture 6 milliGRAM(s) Oral <User Schedule>    [x] Adequacy of sedation and pain control has been assessed and adjusted      RESPIRATORY  RR: 26 (01-14-20 @ 04:00) (16 - 31)  SpO2: 100% (01-14-20 @ 04:32) (91% - 100%)  Wt(kg): --  Exam: unlabored, clear to auscultation bilaterally  Mechanical Ventilation:   ABG - ( 14 Jan 2020 03:27 )  pH: 7.39  /  pCO2: 59    /  pO2: 128   / HCO3: 35    / Base Excess: 9.4   /  SaO2: 100     Lactate: x        [N/A] Extubation Readiness Assessed  Meds: albuterol/ipratropium for Nebulization. 3 milliLiter(s) Nebulizer every 6 hours  buDESOnide    Inhalation Suspension 0.5 milliGRAM(s) Inhalation every 12 hours      CARDIOVASCULAR  HR: 71 (01-14-20 @ 04:32) (63 - 96)  BP: 89/54 (01-14-20 @ 04:00) (88/55 - 140/63)  BP(mean): 69 (01-14-20 @ 04:00) (67 - 91)    Exam: regular rate and rhythm  Cardiac Rhythm: sinus  Perfusion     [x]Adequate   [ ]Inadequate  Mentation   [x]Normal       [ ]Reduced  Extremities  [x]Warm         [ ]Cool  Volume Status [ ]Hypervolemic [x]Euvolemic [ ]Hypovolemic  Meds:     GI/NUTRITION  Exam: soft, nontender, nondistended, incision C/D/I  Diet: mechanical soft  Meds: diphenoxylate/atropine 2 Tablet(s) Oral <User Schedule>  loperamide 16 milliGRAM(s) Oral <User Schedule>  pantoprazole    Tablet 40 milliGRAM(s) Oral before breakfast      GENITOURINARY  I&O's Detail    01-12 @ 07:01  -  01-13 @ 07:00  --------------------------------------------------------  IN:    Oral Fluid: 340 mL    Packed Red Blood Cells: 400 mL    sodium chloride 0.9%.: 120 mL    Solution: 75 mL    Solution: 500 mL    TPN (Total Parenteral Nutrition): 900 mL  Total IN: 2335 mL    OUT:    Chest Tube: 60 mL    Ileostomy: 950 mL    Voided: 1125 mL  Total OUT: 2135 mL    Total NET: 200 mL      01-13 @ 07:01  -  01-14 @ 05:05  --------------------------------------------------------  IN:    fat emulsion (Fish Oil and Plant Based) 20% Infusion: 20.8 mL    fat emulsion (Fish Oil and Plant Based) 20% Infusion: 228.8 mL    Solution: 405 mL    Solution: 475 mL    TPN (Total Parenteral Nutrition): 1150 mL  Total IN: 2279.6 mL    OUT:    Chest Tube: 10 mL    Ileostomy: 930 mL    Voided: 885 mL  Total OUT: 1825 mL    Total NET: 454.6 mL          01-14    140  |  101  |  21  ----------------------------<  156<H>  3.8   |  33<H>  |  0.53    Ca    7.8<L>      14 Jan 2020 03:31  Phos  3.7     01-14  Mg     1.7     01-14    TPro  5.4<L>  /  Alb  2.1<L>  /  TBili  0.6  /  DBili  0.3<H>  /  AST  14  /  ALT  12  /  AlkPhos  58  01-14    [ ] Martinez catheter, indication: N/A  Meds: fat emulsion (Fish Oil and Plant Based) 20% Infusion 20.8 mL/Hr IV Continuous <Continuous>  Parenteral Nutrition - Adult 1 Each TPN Continuous <Continuous>  sodium chloride 0.9%. 1000 milliLiter(s) IV Continuous <Continuous>        HEMATOLOGIC  Meds: enoxaparin Injectable 40 milliGRAM(s) SubCutaneous daily    [x] VTE Prophylaxis                        6.2    12.74 )-----------( 244      ( 14 Jan 2020 04:17 )             20.4     PT/INR - ( 13 Jan 2020 03:06 )   PT: 14.1 sec;   INR: 1.22 ratio         PTT - ( 13 Jan 2020 03:06 )  PTT:48.6 sec  Transfusion     [ ] PRBC   [ ] Platelets   [ ] FFP   [ ] Cryoprecipitate      INFECTIOUS DISEASES  WBC Count: 12.74 K/uL (01-14 @ 04:17)  WBC Count: 13.62 K/uL (01-14 @ 03:31)  WBC Count: 12.81 K/uL (01-13 @ 12:27)    RECENT CULTURES:    Meds: piperacillin/tazobactam IVPB.. 3.375 Gram(s) IV Intermittent every 8 hours        ENDOCRINE  CAPILLARY BLOOD GLUCOSE      POCT Blood Glucose.: 147 mg/dL (13 Jan 2020 21:50)  POCT Blood Glucose.: 119 mg/dL (13 Jan 2020 17:26)  POCT Blood Glucose.: 162 mg/dL (13 Jan 2020 12:17)  POCT Blood Glucose.: 91 mg/dL (13 Jan 2020 08:42)    Meds: cholestyramine Powder (Sugar-Free) 4 Gram(s) Oral two times a day  insulin lispro (HumaLOG) corrective regimen sliding scale   SubCutaneous three times a day before meals  insulin lispro (HumaLOG) corrective regimen sliding scale   SubCutaneous at bedtime  octreotide  Injectable 100 MICROGram(s) SubCutaneous three times a day        ACCESS DEVICES:  [x] Peripheral IV  [ ] Central Venous Line	[ ] R	[ ] L	[ ] IJ	[ ] Fem	[ ] SC	Placed:   [ ] Arterial Line		[ ] R	[ ] L	[ ] Fem	[ ] Rad	[ ] Ax	Placed:   [ ] PICC:					[ ] Mediport  [ ] Urinary Catheter, Date Placed:   [x] Necessity of urinary, arterial, and venous catheters discussed    OTHER MEDICATIONS:  chlorhexidine 2% Cloths 1 Application(s) Topical <User Schedule>      CODE STATUS: full code

## 2020-01-14 NOTE — PROGRESS NOTE ADULT - SUBJECTIVE AND OBJECTIVE BOX
Subjective: Patient seen and examined. No new events except as noted.   remains in ICU   SOB last night   Now feels ok       REVIEW OF SYSTEMS:    CONSTITUTIONAL:+ weakness, fevers or chills  EYES/ENT: No visual changes;  No vertigo or throat pain   NECK: No pain or stiffness  RESPIRATORY: No cough, wheezing, hemoptysis; +shortness of breath  CARDIOVASCULAR: No chest pain or palpitations  GASTROINTESTINAL: No abdominal or epigastric pain. No nausea, vomiting, or hematemesis; No diarrhea or constipation. No melena or hematochezia.  GENITOURINARY: No dysuria, frequency or hematuria  NEUROLOGICAL: No numbness or weakness  SKIN: No itching, burning, rashes, or lesions   All other review of systems is negative unless indicated above.    MEDICATIONS:  MEDICATIONS  (STANDING):  acetaminophen   Tablet .. 975 milliGRAM(s) Oral every 6 hours  albuterol/ipratropium for Nebulization. 3 milliLiter(s) Nebulizer every 6 hours  buDESOnide    Inhalation Suspension 0.5 milliGRAM(s) Inhalation every 12 hours  chlorhexidine 2% Cloths 1 Application(s) Topical <User Schedule>  cholestyramine Powder (Sugar-Free) 4 Gram(s) Oral two times a day  diphenoxylate/atropine 2 Tablet(s) Oral <User Schedule>  enoxaparin Injectable 40 milliGRAM(s) SubCutaneous daily  fat emulsion (Fish Oil and Plant Based) 20% Infusion 20.8 mL/Hr (20.8 mL/Hr) IV Continuous <Continuous>  furosemide   Injectable 20 milliGRAM(s) IV Push once  insulin lispro (HumaLOG) corrective regimen sliding scale   SubCutaneous three times a day before meals  insulin lispro (HumaLOG) corrective regimen sliding scale   SubCutaneous at bedtime  loperamide 16 milliGRAM(s) Oral <User Schedule>  octreotide  Injectable 100 MICROGram(s) SubCutaneous three times a day  opium Tincture 6 milliGRAM(s) Oral <User Schedule>  pantoprazole    Tablet 40 milliGRAM(s) Oral before breakfast  Parenteral Nutrition - Adult 1 Each (50 mL/Hr) TPN Continuous <Continuous>  piperacillin/tazobactam IVPB.. 3.375 Gram(s) IV Intermittent every 8 hours  sodium chloride 0.9%. 1000 milliLiter(s) (10 mL/Hr) IV Continuous <Continuous>      PHYSICAL EXAM:  T(C): 36.7 (01-14-20 @ 07:00), Max: 37.5 (01-13-20 @ 15:00)  HR: 100 (01-14-20 @ 09:44) (70 - 100)  BP: 116/56 (01-14-20 @ 08:00) (89/54 - 140/63)  RR: 14 (01-14-20 @ 08:00) (14 - 29)  SpO2: 97% (01-14-20 @ 09:44) (91% - 100%)  Wt(kg): --  I&O's Summary    13 Jan 2020 07:01  -  14 Jan 2020 07:00  --------------------------------------------------------  IN: 2964.6 mL / OUT: 2040 mL / NET: 924.6 mL    14 Jan 2020 07:01  -  14 Jan 2020 11:21  --------------------------------------------------------  IN: 150 mL / OUT: 100 mL / NET: 50 mL            Appearance: NAD   HEENT:   Dry oral mucosa, crusted lips   Lymphatic: No lymphadenopathy   Cardiovascular: Normal S1 S2, No JVD, No murmurs , Peripheral pulses palpable 2+ bilaterally  Respiratory: Decreased bs R sided + pigtail   Gastrointestinal:  Soft, NT, ND. Ostomy pink with output. Midline staples in place, midline incision site is c/d/i   Skin: No rashes, No ecchymoses, No cyanosis, warm to touch  Musculoskeletal: Decreased  range of motion and strength  Psychiatry:  mildly sedated   Ext: No edema +PICC line   +rosas         LABS:    CARDIAC MARKERS:                                6.2    12.74 )-----------( 244      ( 14 Jan 2020 04:17 )             20.4     01-14    140  |  101  |  21  ----------------------------<  156<H>  3.8   |  33<H>  |  0.53    Ca    7.8<L>      14 Jan 2020 03:31  Phos  3.7     01-14  Mg     1.7     01-14    TPro  5.4<L>  /  Alb  2.1<L>  /  TBili  0.6  /  DBili  0.3<H>  /  AST  14  /  ALT  12  /  AlkPhos  58  01-14    proBNP:   Lipid Profile:   HgA1c:   TSH:     0          TELEMETRY: 	SR    ECG:  	  RADIOLOGY: < from: Xray Chest 1 View- PORTABLE-Urgent (01.14.20 @ 04:11) >    EXAM:  XR CHEST PORTABLE URGENT 1V                            PROCEDURE DATE:  01/14/2020            INTERPRETATION:  A single chest x-ray was obtained on July 14, 2020.    Indication: Shortness of breath.    Impression:    The heart is normal in size. Left pleural effusion. Left lower lobe pneumonia and/or atelectasis. Small right pleural effusion cannot be ruled out. A PICC line is seen on the right and the tip is in superior vena cava. No pneumothorax. Calcified aortic knob.                    TRUDI DIETZ M.D., ATTENDING RADIOLOGIST  This document has been electronically signed. Jan 14 2020  8:37AM                < end of copied text >    DIAGNOSTIC TESTING:  [ ] Echocardiogram:  [ ]  Catheterization:  [ ] Stress Test:    OTHER:

## 2020-01-14 NOTE — CONSULT NOTE ADULT - SUBJECTIVE AND OBJECTIVE BOX
Bee Woodard - [phone]    Patient is a 75y old  Male who presents with a chief complaint of abd. pain (2020 13:14)    HPI:  74M hx COPD and ETOH abuse presented 19 with 2 day hx abdominal pain with nausea/vomiting.  Reportedly felt like gas pain.  No fever. Some SOB.  Found to have SBO, CHI, lactate of 9.4, hypotension and tachycardia.  OR  for ex-lap, SANDRA and decompression enterotomy with primary repair - dusky area of bowel noted.  RTOR  - washout, areas of ischemia of concern.  RTOR 12/10 - partial colectomy, mucous fistula, end ileostomy.  - extubated.   Post-op course complicated by short gut syndrome, delirium, pneumothorax (12/15- ptx s/p CT), episodes of hypotension. New CT placed  by IR.  1/10/2020 - VATS wit pleurex catheter insertion for drainage of pleural effusion.  Seen by derm 1/10 for rash on back - felt to be miliaria rubra due to sweat trapping on the back.  20 - RRT - hypoxia.  Hypotension, leukocytosis, no fever - patient started back on zosyn    ID asked to help management     prior hospital charts reviewed [  ]  primary team notes reviewed [ x ]  other consultant notes reviewed [ x ]    PAST MEDICAL & SURGICAL HISTORY:  COPD with hypoxia  ETOH abuse  History of lumbosacral spine surgery  History of prostate surgery  History of appendectomy    Allergies  IV Contrast (Hives)    ANTIMICROBIALS:  acyclovir   Oral Tab/Cap (-)  cefTRIAXone   IVPB (12/6 x1)  piperacillin/tazobactam IVPB (-12/15)  ceFAZolin   IVPB (-)  vancomycin  IVPB (1/13 x1)    active  piperacillin/tazobactam IVPB.. 3.375 every 8 hours (-)    MEDICATIONS  (STANDING):  acetaminophen   Tablet .. 975 every 6 hours  albuterol/ipratropium for Nebulization. 3 every 6 hours  buDESOnide    Inhalation Suspension 0.5 every 12 hours  cholestyramine Powder (Sugar-Free) 4 two times a day  diphenoxylate/atropine 2 <User Schedule>  enoxaparin Injectable 40 daily  insulin lispro (HumaLOG) corrective regimen sliding scale  three times a day before meals  insulin lispro (HumaLOG) corrective regimen sliding scale  at bedtime  loperamide 16 <User Schedule>  octreotide  Injectable 100 three times a day  opium Tincture 6 <User Schedule>  pantoprazole    Tablet 40 before breakfast    SOCIAL HISTORY:   hx tobacco, hx etoh    FAMILY HISTORY:    REVIEW OF SYSTEMS  [  ] ROS unobtainable because:    [  ] All other systems negative except as noted below:	    Constitutional:  [ ] fever [ ] chills  [ ] weight loss  [ ] weakness  Skin:  [ ] rash [ ] phlebitis	  Eyes: [ ] icterus [ ] pain  [ ] discharge	  ENMT: [ ] sore throat  [ ] thrush [ ] ulcers [ ] exudates  Respiratory: [ ] dyspnea [ ] hemoptysis [ ] cough [ ] sputum	  Cardiovascular:  [ ] chest pain [ ] palpitations [ ] edema	  Gastrointestinal:  [ ] nausea [ ] vomiting [ ] diarrhea [ ] constipation [ ] pain	  Genitourinary:  [ ] dysuria [ ] frequency [ ] hematuria [ ] discharge [ ] flank pain  [ ] incontinence  Musculoskeletal:  [ ] myalgias [ ] arthralgias [ ] arthritis  [ ] back pain  Neurological:  [ ] headache [ ] seizures  [ ] confusion/altered mental status  Psychiatric:  [ ] anxiety [ ] depression	  Hematology/Lymphatics:  [ ] lymphadenopathy  Endocrine:  [ ] adrenal [ ] thyroid  Allergic/Immunologic:	 [ ] transplant [ ] seasonal    Vital Signs Last 24 Hrs  T(F): 98.1 (20 @ 15:00), Max: 99.5 (20 @ 15:00)  Vital Signs Last 24 Hrs  HR: 90 (20 @ 15:00) (70 - 104)  BP: 106/58 (20 @ 15:00) (89/54 - 174/72)  RR: 23 (20 @ 15:00)  SpO2: 94% (20 @ 15:00) (91% - 100%)  Wt(kg): --    PHYSICAL EXAM:      WBC Count: 14.19 (20 @ 12:43)  WBC Count: 12.74 (20 @ 04:17)  WBC Count: 13.62 (20 @ 03:31)  WBC Count: 12.81 (20 @ 12:27)  WBC Count: 21.96 (20 @ 02:53)  WBC Count: 15.75 (20 @ 17:53)  WBC Count: 13.87 (20 @ 11:23)  WBC Count: 10.25 (20 @ 09:37)  WBC Count: 12.69 (20 @ 10:37)  WBC Count: 10.76 (01-10-20 @ 09:53)  WBC Count: 10.79 (20 @ 09:36)  WBC Count: 12.31 (20 @ 09:04)                        8.1    14.19 )-----------( 292      ( 2020 12:43 )             26.2     140  |  101  |  21  ----------------------------<  156<H>  3.8   |  33<H>  |  0.53    Ca    7.8<L>      2020 03:31  Phos  3.7       Mg     1.7         TPro  5.4<L>  /  Alb  2.1<L>  /  TBili  0.6  /  DBili  0.3<H>  /  AST  14  /  ALT  12  /  AlkPhos  58      Urinalysis Basic - ( 2020 12:41 )  Color: brown / Appearance: Turbid / S.016 / pH: x  Gluc: x / Ketone: Negative  / Bili: Negative / Urobili: Negative   Blood: x / Protein: 30 mg/dL / Nitrite: Negative   Leuk Esterase: Negative / RBC: >50 /hpf / WBC 6 /HPF   Sq Epi: x / Non Sq Epi: 0 /hpf / Bacteria: Few    Cytopathology - Non Gyn Report (19 @ 18:26)    Specimen(s) Submitted  PLEURAL FLUID  Final Diagnosis  PLEURAL FLUID  NEGATIVE FOR MALIGNANT CELLS.    Surgical Pathology Report (12.10.19 @ 21:40)    Surgical Final Report  Final Diagnosis  Small bowel with ileocolic resection:  - Small bowel with diffuse ischemic enteritis, transmural active inflammation, abscess formation and focal necrosis  - Ischemic enteritis extending to proximal small bowel resection margin  - Distal colonic resection margin, viable    MICROBIOLOGY:  Culture - Fungal, Blood (collected 2020 18:15)  Source: .Blood Blood  Preliminary Report (2020 08:34):    Testing in progress    Culture - Blood (collected 2020 06:13)  Source: .Blood Blood-Peripheral  Preliminary Report (2020 07:02):    No growth to date.    Culture - Acid Fast (19 @ 03:58)    AFB Specimen Processing: Concentration    Acid-Fast Smear: Negative: Performed At:  [facility] [address] [id number]  Paresh Roche MD Ph:[phone]    Culture - Blood (19 @ 22:09)    Specimen Source: .Blood Blood-Peripheral    Culture Results:   No growth at 5 days.    Culture - Fungal, Body Fluid (19 @ 19:16)    Specimen Source: .Body Fluid Pleural Fluid    Culture Results:   No growth    Culture - Body Fluid with Gram Stain (19 @ 19:16)    Gram Stain:   No polymorphonuclear cells seen  No organisms seen  by cytocentrifuge    Specimen Source: .Body Fluid Pleural Fluid    Culture Results:   No growth at 5 days    Culture - Blood (19 @ 22:20)    Specimen Source: .Blood Blood    Culture Results:   No growth at 5 days.    Culture - Blood (19 @ 22:20)    Specimen Source: .Blood Blood    Culture Results:   No growth at 5 days.    RADIOLOGY:  imaging below personally reviewed and agree with findings    Xray Chest 1 View- PORTABLE-Urgent (20 @ 04:11) >  Impression:  The heart is normal in size. Left pleural effusion. Left lower lobe pneumonia and/or atelectasis. Small right pleural effusion cannot be ruled out. A PICC line is seen on the right and the tip is in superior vena cava. No pneumothorax. Calcified aortic knob.    CT Abdomen and Pelvis w/ IV Cont (19 @ 06:05) >  IMPRESSION:   Small midline anterior abdominal wall fluid collection may represent postoperative seroma, hematoma, or less likely abscess.  Interval resolution of the left pneumothorax.  Bilateral pleural effusions, left greater than right, slightly increased in size compared to the prior study.  Unchanged right lower lobe tree-in-bud/nodular opacities with secretions in the bilateral lower lobe bronchi. Findings may represent pneumonia versus distal mucoid impacted airways.    CT Chest No Cont (19 @ 19:39) >  IMPRESSION:  Left-sided chest tube in place with trace left apical pneumothorax.  Patchy airspace opacities in the right lower lobe, differential favors distal mucoid impacted airways versus pneumonia.  Moderate left and small right pleural effusions with mild subsegmental atelectasis of the right lower lobe.    CT Abdomen and Pelvis No Cont (19 @ 16:48) >  IMPRESSION:   High-grade small bowel obstruction with transition point in the right lower quadrant. Bee Woodard - [phone]    Patient is a 75y old  Male who presents with a chief complaint of abd. pain (2020 13:14)    HPI:  74M hx COPD and ETOH abuse presented 19 with 2 day hx abdominal pain with nausea/vomiting.  Reportedly felt like gas pain.  No fever. Some SOB.  Found to have SBO, CHI, lactate of 9.4, hypotension and tachycardia.  OR  for ex-lap, SANDRA and decompression enterotomy with primary repair - dusky area of bowel noted.  RTOR  - washout, areas of ischemia of concern.  RTOR 12/10 - partial colectomy, mucous fistula, end ileostomy.  - extubated.   Post-op course complicated by short gut syndrome, delirium, pneumothorax (12/15- ptx s/p CT), episodes of hypotension. New CT placed  by IR.  1/10/2020 - VATS wit pleurex catheter insertion for drainage of pleural effusion.  Seen by derm 1/10 for rash on back - felt to be miliaria rubra due to sweat trapping on the back.  20 - RRT - hypoxia.  Hypotension, leukocytosis, no fever - patient started back on zosyn, given PRBC for acute drop hemoglobin.  Still very SOB with distended abdomen.  On TPN via R PICC.  No CP, no dysuria.  Loose BM from ileostomy.  Coughing, however, this is chronic and per patient, without change.    ID asked to help management     prior hospital charts reviewed [  ]  primary team notes reviewed [ x ]  other consultant notes reviewed [ x ]    PAST MEDICAL & SURGICAL HISTORY:  COPD with hypoxia  ETOH abuse  History of lumbosacral spine surgery  History of prostate surgery  History of appendectomy    Allergies  IV Contrast (Hives)    ANTIMICROBIALS:  acyclovir   Oral Tab/Cap (-)  cefTRIAXone   IVPB (12/6 x1)  piperacillin/tazobactam IVPB (-12/15)  ceFAZolin   IVPB (-)  vancomycin  IVPB (1/13 x1)    active  piperacillin/tazobactam IVPB.. 3.375 every 8 hours (-)    MEDICATIONS  (STANDING):  acetaminophen   Tablet .. 975 every 6 hours  albuterol/ipratropium for Nebulization. 3 every 6 hours  buDESOnide    Inhalation Suspension 0.5 every 12 hours  cholestyramine Powder (Sugar-Free) 4 two times a day  diphenoxylate/atropine 2 <User Schedule>  enoxaparin Injectable 40 daily  insulin lispro (HumaLOG) corrective regimen sliding scale  three times a day before meals  insulin lispro (HumaLOG) corrective regimen sliding scale  at bedtime  loperamide 16 <User Schedule>  octreotide  Injectable 100 three times a day  opium Tincture 6 <User Schedule>  pantoprazole    Tablet 40 before breakfast    SOCIAL HISTORY:   hx tobacco, hx etoh    FAMILY HISTORY:  etoh    REVIEW OF SYSTEMS  [  ] ROS unobtainable because:    [ x ] All other systems negative except as noted below:	    Constitutional:  [ ] fever [ ] chills  [x ] weight loss  [x ] weakness  Skin:  [ ] rash [ ] phlebitis	  Eyes: [ ] icterus [ ] pain  [ ] discharge	  ENMT: [ ] sore throat  [ ] thrush [ ] ulcers [ ] exudates  Respiratory: [ x] dyspnea [ ] hemoptysis [ x] cough [ ] sputum	  Cardiovascular:  [ ] chest pain [ ] palpitations [ ] edema	  Gastrointestinal:  [ ] nausea [ ] vomiting [x ] diarrhea [ ] constipation [ ] pain  [  ] odynophagia	  Genitourinary:  [ ] dysuria [ ] frequency [ ] hematuria [ ] discharge [ ] flank pain  [ ] incontinence  Musculoskeletal:  [ ] myalgias [ ] arthralgias [ ] arthritis  [ ] back pain  Neurological:  [ ] headache [ ] seizures  [ ] confusion/altered mental status  Psychiatric:  [ ] anxiety [ ] depression	  Hematology/Lymphatics:  [ ] lymphadenopathy  Endocrine:  [ ] adrenal [ ] thyroid  Allergic/Immunologic:	 [ ] transplant [ ] seasonal    Vital Signs Last 24 Hrs  T(F): 98.1 (20 @ 15:00), Max: 99.5 (20 @ 15:00)  Vital Signs Last 24 Hrs  HR: 90 (20 @ 15:00) (70 - 104)  BP: 106/58 (20 @ 15:00) (89/54 - 174/72)  RR: 23 (20 @ 15:00)  SpO2: 94% (20 @ 15:00) (91% - 100%)  Wt(kg): --    PHYSICAL EXAM:  Constitutional: cachectic, dyspneic, non-toxic  HEAD/EYES:  anicteric, no conjunctival injection  ENT:  supple, no sores, no thrush, dry mucous membranes  Cardiovascular:   normal S1, S2, no murmur, (+) edema  Respiratory:  rhonchi b/l, decreased BS L, pleurex with little output - old blood in tubing  GI:  distended, tympanic, ileostomy with liquid stool, non-tender; incision healing - no cellulitis  :  no rosas  Musculoskeletal:  no synovitis  Neurologic: awake and alert, no focal findings  Skin:  no rash, no erythema, no phlebitis - R PICC c/d/i  Psychiatric:  awake, alert, appropriate mood    WBC Count: 14.19 (20 @ 12:43)  WBC Count: 12.74 (20 @ 04:17)  WBC Count: 13.62 (20 @ 03:31)  WBC Count: 12.81 (20 @ 12:27)  WBC Count: 21.96 (20 @ 02:53)  WBC Count: 15.75 (20 @ 17:53)  WBC Count: 13.87 (20 @ 11:23)  WBC Count: 10.25 (20 @ 09:37)  WBC Count: 12.69 (20 @ 10:37)  WBC Count: 10.76 (01-10-20 @ 09:53)  WBC Count: 10.79 (20 @ 09:36)  WBC Count: 12.31 (20 @ 09:04)                        8.1    14.19 )-----------( 292      ( 2020 12:43 )             26.2     140  |  101  |  21  ----------------------------<  156<H>  3.8   |  33<H>  |  0.53    Ca    7.8<L>      2020 03:31  Phos  3.7       Mg     1.7         TPro  5.4<L>  /  Alb  2.1<L>  /  TBili  0.6  /  DBili  0.3<H>  /  AST  14  /  ALT  12  /  AlkPhos  58      Urinalysis Basic - ( 2020 12:41 )  Color: brown / Appearance: Turbid / S.016 / pH: x  Gluc: x / Ketone: Negative  / Bili: Negative / Urobili: Negative   Blood: x / Protein: 30 mg/dL / Nitrite: Negative   Leuk Esterase: Negative / RBC: >50 /hpf / WBC 6 /HPF   Sq Epi: x / Non Sq Epi: 0 /hpf / Bacteria: Few    Cytopathology - Non Gyn Report (19 @ 18:26)    Specimen(s) Submitted  PLEURAL FLUID  Final Diagnosis  PLEURAL FLUID  NEGATIVE FOR MALIGNANT CELLS.    Surgical Pathology Report (12.10.19 @ 21:40)    Surgical Final Report  Final Diagnosis  Small bowel with ileocolic resection:  - Small bowel with diffuse ischemic enteritis, transmural active inflammation, abscess formation and focal necrosis  - Ischemic enteritis extending to proximal small bowel resection margin  - Distal colonic resection margin, viable    MICROBIOLOGY:  Fungitell (20 @ 11:42)    Fungitell: 241:     Culture - Fungal, Blood (collected 2020 18:15)  Source: .Blood Blood  Preliminary Report (2020 08:34):    Testing in progress    Culture - Blood (collected 2020 06:13)  Source: .Blood Blood-Peripheral  Preliminary Report (2020 07:02):    No growth to date.    Culture - Acid Fast (19 @ 03:58)    AFB Specimen Processing: Concentration    Acid-Fast Smear: Negative: Performed At:  [facility] [id number]  Paresh Roche MD Ph:[phone]    Culture - Blood (19 @ 22:09)    Specimen Source: .Blood Blood-Peripheral    Culture Results:   No growth at 5 days.    Culture - Fungal, Body Fluid (19 @ 19:16)    Specimen Source: .Body Fluid Pleural Fluid    Culture Results:   No growth    Culture - Body Fluid with Gram Stain (19 @ 19:16)    Gram Stain:   No polymorphonuclear cells seen  No organisms seen  by cytocentrifuge    Specimen Source: .Body Fluid Pleural Fluid    Culture Results:   No growth at 5 days    Culture - Blood (19 @ 22:20)    Specimen Source: .Blood Blood    Culture Results:   No growth at 5 days.    Culture - Blood (19 @ 22:20)    Specimen Source: .Blood Blood    Culture Results:   No growth at 5 days.    RADIOLOGY:  imaging below personally reviewed and agree with findings    Xray Chest 1 View- PORTABLE-Urgent (20 @ 04:11) >  Impression:  The heart is normal in size. Left pleural effusion. Left lower lobe pneumonia and/or atelectasis. Small right pleural effusion cannot be ruled out. A PICC line is seen on the right and the tip is in superior vena cava. No pneumothorax. Calcified aortic knob.    Xray Chest 1 View- PORTABLE-Routine (20 @ 09:47) >  IMPRESSION:  Interval placement of right-sided chest tube.  Bilateral lower lung patchy opacities and small bilateral pleural effusions likely representing pulmonary edema.     Xray Chest 1 View- PORTABLE-Routine (20 @ 07:00) >  IMPRESSION: Right PICC line is unchanged. Left pleural catheters are partially imaged. Small pleural effusions likely with associated areas of atelectasis. No clear evidence of a pneumothorax given left superimposed subcutaneous emphysema.    CT Abdomen and Pelvis w/ IV Cont (19 @ 06:05) >  IMPRESSION:   Small midline anterior abdominal wall fluid collection may represent postoperative seroma, hematoma, or less likely abscess.  Interval resolution of the left pneumothorax.  Bilateral pleural effusions, left greater than right, slightly increased in size compared to the prior study.  Unchanged right lower lobe tree-in-bud/nodular opacities with secretions in the bilateral lower lobe bronchi. Findings may represent pneumonia versus distal mucoid impacted airways.    CT Chest No Cont (19 @ 19:39) >  IMPRESSION:  Left-sided chest tube in place with trace left apical pneumothorax.  Patchy airspace opacities in the right lower lobe, differential favors distal mucoid impacted airways versus pneumonia.  Moderate left and small right pleural effusions with mild subsegmental atelectasis of the right lower lobe.    CT Abdomen and Pelvis No Cont (19 @ 16:48) >  IMPRESSION:   High-grade small bowel obstruction with transition point in the right lower quadrant.

## 2020-01-14 NOTE — PROGRESS NOTE ADULT - SUBJECTIVE AND OBJECTIVE BOX
Dr. Carroll (Attending Physician)  Acute episode of profound bradycardia and respiratory distress with increased wob placed on bipap with improvement in resp status and HR from 40s to 100s. Bedside ultrasound revealed large left pleural effusion. Thoracic called and we were unable to drain his left pleural effusion.  If respiratory status worsens will place new chest tube. Planning to CT chest will CT abdomen at same time.  Increased abd. distension changed from bipap to cpap, good ostomy output, likely gastric distension for bipap.    This patient was critically ill with one or more vital organ systems at a high probability of imminent or life threatening deterioration. Complex decision making was required to assess and treat single or multiple vital organ system failure and/or prevent further life threatening deterioration of the patient’s condition.  All recent labwork and imaging was reviewed.  Total Critical Care Time 35 min

## 2020-01-14 NOTE — PROGRESS NOTE ADULT - ASSESSMENT
--trend cbc  --maintain bipap   --CT chest wo  --NPO after MN    thoracic [id number]  d/w Dr. Mayes 75 y/o M presenting with septic shock and high grade SBO s/p exploratory laparotomy, lysis of adhesions, decompression of bowel via enterotomy w/ primary repair, and Abthera VAC placement on 12/6; s/p take down of Abthera, washout and re-application of the Abthera vac on 12/8. Now s/p SBR and end ileostomy on 12/10 acute respiratory distress with persistent  Pneumothorax s/p pigtail and loculated effusion with persistent drainage   now s/p pleurex on 1/10.   Post-op course complicated by intermittent episodes of respiratory distress requiring pleurex returned to wall suction and now BiPap support.  Called for evaluation of pleurex s/p acute episode of bradycardia and respiratory distress requiring Bipap. Bedside ultrasound revealed large left pleural effusion.  Thoracic contacted to troubleshoot pleurex.  Minimal drainage noted, scant amount of dark old blood noted with serosang in tubing.  Stopcock attached and aspiration attempted with syringe, scant amount serosang drng.  Tube returned to pleur-evac, Dr. Mayes contacted and updated on anemia requiring 2 U PRBC earlier along with increasing left pleural effusion.  At this time, patient stable on Bipap 12/7 40% with RR 16-20.   SpO2 95%.    Will continue to observe overnight, if respiratory status worsens associated with anemia and hemothorax suspect will consider chest tube.     Plan:  --trend cbc  --maintain bipap   --CT chest wo in AM   --NPO after MN for possible pleurex replacement    thoracic [id number]  d/w Dr. Mayes

## 2020-01-14 NOTE — CONSULT NOTE ADULT - ASSESSMENT
75M active smoker with COPD (not on home O2), active EtOH abuse admitted 12/6 with SBO.  Hospital course complicated by CHI, delirium, short gut syndrome, pneumothorax, episodes of hypotension, hypoxia, rash, anemia and transfusion requirements.      12/6/19 - OR for ex-lap, SANDRA and decompression enterotomy with primary repair - dusky area of bowel noted  12/8/19 - RTOR for washout, areas of ischemia of concern  12/10/19 - RTOR for partial colectomy, mucous fistula, end ileostomy  12/11/19 - extubated  12/15/19 - pneumothorax s/p L CT  12/30/19 - CT by IR  1/10/2020 - VATS with pleurex catheter insertion for drainage of pleural effusion.  1/10/2020 - rash - derm - miliaria rubra due to sweat trapping on the back  1/13/20/20 - RRT - hypoxia, hypotension, leukocytosis, no fever, drop in H/H    After RRT, cultures sent, zosyn started, CXR with increased LLL opacity.  Given PRBC.  Elevated fungitell noted.  Significance is not clear.  Though he is on TPN, fungal blood cultures are negative.  Not really at risk for PJP though HIV status is not known.  He is not really at risk for invasive aspergillosis.    Fungitell  - send HIV testing  - check LDH  - if both are (+) or elevated, can evaluate for PJP  - f/u fungal blood cultures  - galactomannen    Leukocytosis  - possible underlying pneumonia  - zosyn okay for now  - f/u all cultures    Pneumonia  - f/u sputum cultures    I have discussed plan of care as detailed above with sicu attending and housestaff

## 2020-01-14 NOTE — PROGRESS NOTE ADULT - SUBJECTIVE AND OBJECTIVE BOX
[facility] NUTRITION SUPPORT / TPN -- FOLLOW UP NOTE  --------------------------------------------------------------------------------    24 hour events/subjective:  Pt remains in SICU- sat'ing well on NC O2  Pt w/ low h/h-->transfused 1U pRBC with appropriate response  S/p lt VATS and pleurax catheter placement on Friday,        improvement w/ Lt CT placed to suction & Lasix- breathing more easily  Pt continues w/decreased Ileostomy output- on regular/ soft mechanical diet            replacement with 0.5 cc per cc output with NS            continue lomotil, imodium, octreotide, tincture of opium  Tolerating TPN  Leukocytosis, Zosyn- recent cultures remain neg  Pt tolerating  diet- eating well, w/o n/v       nervous to drink clears- discussed options  Pt denies any pain/ cough/ palp/ sob/ dyspnea  No f/c/s      Diet:  Diet, Dysphagia 1 Pureed-Nectar Consistency Fluid:   Supplement Feeding Modality:  Oral  Ensure Pudding Cans or Servings Per Day:  2       Frequency:  Two Times a day (01-14-20 @ 11:35)      Appetite: [  ]Poor [  ]Adequate [x  ]Good  Caloric intake:  [ x  ]  Adequate   [   ] Inadequate    ROS: General/ GI see HPI  all other systems negative      ALLERGIES & MEDICATIONS  --------------------------------------------------------------------------------  ALLERGIES  IV Contrast (Hives)    STANDING INPATIENT MEDICATIONS    acetaminophen   Tablet .. 975 milliGRAM(s) Oral every 6 hours  albuterol/ipratropium for Nebulization. 3 milliLiter(s) Nebulizer every 6 hours  buDESOnide    Inhalation Suspension 0.5 milliGRAM(s) Inhalation every 12 hours  chlorhexidine 2% Cloths 1 Application(s) Topical <User Schedule>  cholestyramine Powder (Sugar-Free) 4 Gram(s) Oral two times a day  diphenoxylate/atropine 2 Tablet(s) Oral <User Schedule>  enoxaparin Injectable 40 milliGRAM(s) SubCutaneous daily  fat emulsion (Fish Oil and Plant Based) 20% Infusion 12.5 mL/Hr IV Continuous <Continuous>  furosemide   Injectable 20 milliGRAM(s) IV Push once  insulin lispro (HumaLOG) corrective regimen sliding scale   SubCutaneous three times a day before meals  insulin lispro (HumaLOG) corrective regimen sliding scale   SubCutaneous at bedtime  loperamide 16 milliGRAM(s) Oral <User Schedule>  octreotide  Injectable 100 MICROGram(s) SubCutaneous three times a day  opium Tincture 6 milliGRAM(s) Oral <User Schedule>  pantoprazole    Tablet 40 milliGRAM(s) Oral before breakfast  Parenteral Nutrition - Adult 1 Each TPN Continuous <Continuous>  Parenteral Nutrition - Adult 1 Each TPN Continuous <Continuous>  piperacillin/tazobactam IVPB.. 3.375 Gram(s) IV Intermittent every 8 hours  sodium chloride 0.9%. 1000 milliLiter(s) IV Continuous <Continuous>      PRN INPATIENT MEDICATION  ondansetron Injectable 4 milliGRAM(s) IV Push every 6 hours PRN        VITALS/PHYSICAL EXAM  --------------------------------------------------------------------------------  T(C): 36.7 (01-14-20 @ 11:00), Max: 37.5 (01-13-20 @ 15:00)  HR: 87 (01-14-20 @ 11:34) (70 - 104)  BP: 116/59 (01-14-20 @ 11:00) (89/54 - 174/72)  RR: 24 (01-14-20 @ 11:00) (14 - 29)  SpO2: 100% (01-14-20 @ 11:34) (91% - 100%)  Wt(kg): --        01-13-20 @ 07:01  -  01-14-20 @ 07:00  --------------------------------------------------------  IN: 2964.6 mL / OUT: 2040 mL / NET: 924.6 mL    01-14-20 @ 07:01  -  01-14-20 @ 13:16  --------------------------------------------------------  IN: 300 mL / OUT: 300 mL / NET: 0 mL    PHYSICAL EXAM  --------------------------------------------------------------------------------  	Gen: guarded but stable, A&Ox3, NC O2  	HEENT: NC/AT, PERRL, supple neck, clear oropharynx              Chest:  CTA, Lt chest pleureX Cath to wall suction, dressing c/d/i  	GI: (+) BS, softly distended, non tender                   midline incision c/d/i w/o drainage                   (+)ostomy pink viable- thick light brown liquid stool like material              MSK: FROM x4, no contractures nor deformities  	Vascular: Equally Warm, (+)BLE edema, no edema BUE,  no clubbing, cyanosis,                        Rt PICC dressing c/d/I w/o swelling or s/sx infection   	Neuro: No focal deficits, intact sensation, weakened strength  	Psych: Normal affect and mood          LABS/ CULTURES/ RADIOLOGY:              8.1    14.19 >-----------<  292      [01-14-20 @ 12:43]              26.2     140  |  101  |  21  ----------------------------<  156      [01-14-20 @ 03:31]  3.8   |  33  |  0.53        Ca     7.8     [01-14-20 @ 03:31]      Mg     1.7     [01-14-20 @ 03:31]      Phos  3.7     [01-14-20 @ 03:31]    TPro  5.4  /  Alb  2.1  /  TBili  0.6  /  DBili  0.3  /  AST  14  /  ALT  12  /  AlkPhos  58  [01-14-20 @ 03:31]    PT/INR: PT 14.1 , INR 1.22       [01-13-20 @ 03:06]  PTT: 48.6       [01-13-20 @ 03:06]    Blood Gas Arterial - Calcium, Ionized: 1.13 mmoL/L (01-14-20 @ 03:27)  Blood Gas Arterial - Calcium, Ionized: 1.14 mmoL/L (01-13-20 @ 12:25)    Blood Gas Arterial, Lactate: 0.5 mmol/L (01.14.20 @ 03:27)    Blood Gas Profile - Arterial (01.14.20 @ 03:27)    pH, Arterial: 7.39    pCO2, Arterial: 59 mmHg    pO2, Arterial: 128 mmHg    HCO3, Arterial: 35 mmoL/L    Base Excess, Arterial: 9.4 mmol/L    Oxygen Saturation, Arterial: 100 %    Total CO2, Arterial: 37 mmoL/L    FIO2, Arterial: 40    Fecal Fat 72 Hour (01.04.20 @ 21:42)    Fecal Fat 72 Hour: 40 g/24 h  : Total sample weight was changed to reflect actual  weight received by laboratory.  This test was developed and its analytical  performance characteristics have been determined  by [facility]. It has not been cleared or approved by  FDA. This assay has been validated pursuant to the  CLIA regulations and is used for clinical  purposes.    Fecal Fat Collection Duration: 72 h    Feces Total Weight: 7775 g        CAPILLARY BLOOD GLUCOSE  POCT Blood Glucose.: 155 mg/dL (14 Jan 2020 08:07)  POCT Blood Glucose.: 147 mg/dL (13 Jan 2020 21:50)  POCT Blood Glucose.: 119 mg/dL (13 Jan 2020 17:26)    Prealbumin, Serum: 13 mg/dL (01-14-20 @ 07:29)  Prealbumin, Serum: 13 mg/dL (01-07-20 @ 02:35)  Prealbumin, Serum: 14 mg/dL (01-06-20 @ 07:40)  Prealbumin, Serum: 16 mg/dL (01-01-20 @ 02:48)  Prealbumin, Serum: 15 mg/dL (12-31-19 @ 03:11)    Triglycerides, Serum: 60 mg/dL (01.14.20 @ 03:31)  Triglycerides, Serum: 50 mg/dL (01.07.20 @ 01:05)  Triglycerides, Serum: 48 mg/dL (01.06.20 @ 02:44)  Triglycerides, Serum: 56 mg/dL (01.01.20 @ 00:42)      Culture - Fungal, Blood (01.13.20 @ 18:15)    Specimen Source: .Blood Blood    Culture Results:   Testing in progress    Culture - Blood (01.13.20 @ 06:13)    Specimen Source: .Blood Blood-Peripheral    Culture Results:   No growth to date.      < from: Xray Chest 1 View- PORTABLE-Urgent (01.14.20 @ 04:11) >  Indication: Shortness of breath.    Impression:    The heart is normal in size. Left pleural effusion. Left lower lobe pneumonia and/or atelectasis. Small right pleural effusion cannot be ruled out. A PICC line is seen on the right and the tip is in superior vena cava. No pneumothorax. Calcified aortic knob.    < end of copied text >

## 2020-01-14 NOTE — PROGRESS NOTE ADULT - ASSESSMENT
A/P: 74 year old male w/ PMH of COPD and EtOH dependence with PSH/o appy, prostate surgery, & spine surgery  septic shock and high grade SBO s/p exploratory laparotomy, lysis of adhesions, decompression of bowel via enterotomy w/ primary repair, and Abthera VAC placement on 12/6; s/p take down of Abthera, washout and re-application of the Abthera vac on 12/8. Now s/p SBR and end ileostomy on 12/10.   TPN consulted to assist w/ management of pt's nutrition in pt w/ prolonged hospital course now tolerating diet but has high Ileostomy output.  Pt s/p Lt Chest Pigtail for effusion in IR with persistent high output on 1/10/2020 pt had Video assisted thorascopic placement of Left PleurX catheter    Pt remains in SICU -off BiPAP, recieved 1U pRBC  Improving Respiratory Acidosis w/compensatory Metabolic Alkalosis  Pt w/ improving severe Protein-Calorie Malnutrition      - Decrease  AA to 60g, Dextrose to 140g, and SMOF 30g in 1200mL vol in TPN      - Fecal Fat results noted- elevated fat levels in stool      - Improving ostomy output- pt eating more regularly- decreasing output -             ICU repleting w/ 0.5:1 IVF q6h      - Diet advanced to Soft Mechanical w/ Protein Supplements            follow up with speech tx - pt 'nervous' to drink thin liquids      - Strict Intake and Output- Continue TPN & octreotide, lomotil, tincture of opium, & imodium             Decreased TPN volume to 1200mL    TPN at GOAL:  120 grams amino acids  210 grams dextrose   50 grams SMOF /lipid   Micronutrients: 10ml MVI, 3ml MTE-5    BLE Edema- Decrease TPN volume & total Sodium in TPN           NaCl decreased to 0mEq and NaAce decreased from 60mEq to 0mEq  Keep KCl of 40 mEq KCl in TPN- pt being given lasix  HypoPhos resolving, continue NaPhos of 45mMol   Leukocytosis- Zosyn, for possible PNA         holding Vanco with close monitoring for renal impairment   Fecal fat testing- results noted         pt with decreased absorption of fat  Hyperglycemia- will continue to monitor-       Fingersticks & ISS coverage - to be ordered by primary team  PICC w/dedicated port for only TPN - maintenance as per protocol  Weights three times a week  Monitor BMP, Mg, Ionized Ca, Phosphorus daily  Continue to monitor Triglycerides and Pre-albumin weekly  Continue as per Surgery, will follow with you, D/w primary team    Andreina Hubbard PA-C  TPN team, pager [phone]  D/w An a[facility] Siegel

## 2020-01-14 NOTE — PROGRESS NOTE ADULT - SUBJECTIVE AND OBJECTIVE BOX
Patient is a 75y old  Male who presents with a chief complaint of abd. pain (2020 05:05)      Vital Signs Last 24 Hrs  T(C): 36.7 (20 @ 07:00), Max: 37.5 (20 @ 15:00)  T(F): 98.1 (20 @ 07:00), Max: 99.5 (20 @ 15:00)  HR: 100 (20 09:44) (70 - 100)  BP: 116/56 (20 @ 08:00) (89/54 - 140/63)  RR: 14 (20 @ 08:00) (14 - 29)  SpO2: 97% (20 @ 09:44) (91% - 100%)              20 @ 07:01  -  20 @ 07:00  --------------------------------------------------------  IN: 2964.6 mL / OUT: 2040 mL / NET: 924.6 mL        Daily Weight in k.6 (2020 01:24)                          6.2    12.74 )-----------( 244      ( 2020 04:17 )             20.4     140  |  101  |  21  ----------------------------<  156<H>  3.8   |  33<H>  |  0.53        PHYSICAL EXAM  Neurology: A&Ox3, NAD  CV : RRR+S1S2  Lungs: Respirations non-labored, B/L BS CTA  +left pleurx catheter to pleurovac with serosanguinous drainage  Abdomen: Soft, NT/ND, +BSx4Q  Extremities: B/L LE warm, no edema, +PP             MEDICATIONS  acetaminophen   Tablet .. 975 milliGRAM(s) Oral every 6 hours  albuterol/ipratropium for Nebulization. 3 milliLiter(s) Nebulizer every 6 hours  buDESOnide    Inhalation Suspension 0.5 milliGRAM(s) Inhalation every 12 hours  chlorhexidine 2% Cloths 1 Application(s) Topical <User Schedule>  cholestyramine Powder (Sugar-Free) 4 Gram(s) Oral two times a day  diphenoxylate/atropine 2 Tablet(s) Oral <User Schedule>  enoxaparin Injectable 40 milliGRAM(s) SubCutaneous daily  fat emulsion (Fish Oil and Plant Based) 20% Infusion 20.8 mL/Hr IV Continuous <Continuous>  furosemide   Injectable 20 milliGRAM(s) IV Push once  insulin lispro (HumaLOG) corrective regimen sliding scale   SubCutaneous three times a day before meals  insulin lispro (HumaLOG) corrective regimen sliding scale   SubCutaneous at bedtime  loperamide 16 milliGRAM(s) Oral <User Schedule>  octreotide  Injectable 100 MICROGram(s) SubCutaneous three times a day  ondansetron Injectable 4 milliGRAM(s) IV Push every 6 hours PRN  opium Tincture 6 milliGRAM(s) Oral <User Schedule>  pantoprazole    Tablet 40 milliGRAM(s) Oral before breakfast  Parenteral Nutrition - Adult 1 Each TPN Continuous <Continuous>  piperacillin/tazobactam IVPB.. 3.375 Gram(s) IV Intermittent every 8 hours  sodium chloride 0.9%. 1000 milliLiter(s) IV Continuous <Continuous> Patient is a 75y old  Male who presents with a chief complaint of abd. pain (2020 05:05)      Vital Signs Last 24 Hrs  T(C): 36.7 (20 @ 07:00), Max: 37.5 (20 @ 15:00)  T(F): 98.1 (20 @ 07:00), Max: 99.5 (20 @ 15:00)  HR: 100 (20 09:44) (70 - 100)  BP: 116/56 (20 @ 08:00) (89/54 - 140/63)  RR: 14 (20 @ 08:00) (14 - 29)  SpO2: 97% (20 @ 09:44) (91% - 100%)              20 @ 07:01  -  20 @ 07:00  --------------------------------------------------------  IN: 2964.6 mL / OUT: 2040 mL / NET: 924.6 mL        Daily Weight in k.6 (2020 01:24)                          6.2    12.74 )-----------( 244      ( 2020 04:17 )             20.4     140  |  101  |  21  ----------------------------<  156<H>  3.8   |  33<H>  |  0.53        PHYSICAL EXAM  Neurology: A&Ox3, NAD  CV : RRR+S1S2  Lungs: Respirations non-labored, B/L BS CTA  +left pleurx catheter to pleurovac with bloody drainage  Abdomen: Soft, NT/ND, +BSx4Q  Extremities: B/L LE warm, no edema, +PP             MEDICATIONS  acetaminophen   Tablet .. 975 milliGRAM(s) Oral every 6 hours  albuterol/ipratropium for Nebulization. 3 milliLiter(s) Nebulizer every 6 hours  buDESOnide    Inhalation Suspension 0.5 milliGRAM(s) Inhalation every 12 hours  chlorhexidine 2% Cloths 1 Application(s) Topical <User Schedule>  cholestyramine Powder (Sugar-Free) 4 Gram(s) Oral two times a day  diphenoxylate/atropine 2 Tablet(s) Oral <User Schedule>  enoxaparin Injectable 40 milliGRAM(s) SubCutaneous daily  fat emulsion (Fish Oil and Plant Based) 20% Infusion 20.8 mL/Hr IV Continuous <Continuous>  furosemide   Injectable 20 milliGRAM(s) IV Push once  insulin lispro (HumaLOG) corrective regimen sliding scale   SubCutaneous three times a day before meals  insulin lispro (HumaLOG) corrective regimen sliding scale   SubCutaneous at bedtime  loperamide 16 milliGRAM(s) Oral <User Schedule>  octreotide  Injectable 100 MICROGram(s) SubCutaneous three times a day  ondansetron Injectable 4 milliGRAM(s) IV Push every 6 hours PRN  opium Tincture 6 milliGRAM(s) Oral <User Schedule>  pantoprazole    Tablet 40 milliGRAM(s) Oral before breakfast  Parenteral Nutrition - Adult 1 Each TPN Continuous <Continuous>  piperacillin/tazobactam IVPB.. 3.375 Gram(s) IV Intermittent every 8 hours  sodium chloride 0.9%. 1000 milliLiter(s) IV Continuous <Continuous>

## 2020-01-14 NOTE — PROGRESS NOTE ADULT - ASSESSMENT
75 y/o male presenting with high grade SBO s/p exploratory laparotomy, lysis of adhesions, decompression of bowel via enterotomy w/ primary repair, & Abthera VAC placement (12/06/2019); RTOR for re-exploration w/ Abthera VAC replacement (12/08/2019) to allow for demarcation of ischemic small bowel; RTOR for re-exploration, small bowel resection of 150 cm (150 cm remaining), ileocecetomy, end ileostomy, mucous fistula, and abdominal closure (12/10/2019); hospital course complicated by SVT, short bowel syndrome requiring repletions w/ IV fluids, malnutrition requiring TPN, intermittent episodes of hypotension, spontaneous left pneumothorax s/p pigtail catheter (12/15/2019-12/24/2019), left pleural effusion s/p pigtail catheter w/ IR (12/30/2019), dysphagia, and oral HSV lesions    Neuro: no acute issues  - Monitor mental status  - Pain control as needed with acetaminophen    Resp: COPD, spontaneous left pneumothorax s/p pigtail catheter, left pleural effusion s/p pigtail catheter w/ IR (12/30). RRT for hypoxic respiratory distress  - Weaned off of BiPAP  - Cont Pleurx cathter to suction  - Spontaneous left pneumothorax s/p pigtail catheter from 12/15/2019-12/24/2019; left pleural effusion s/p pigtail catheter w/ IR placed on 12/30/2019, will monitor output  - OOB to chair, encourage IS  - Pulmicort and Duoneb for COPD  - Will need outpatient follow-up with a pulmonologist for his COPD as patient does not currently have one    CV: SVT  - Monitor vital signs    GI: s/p exploratory laparotomy, [place], decompression of bowel via enterotomy w/ primary repair, & Abthera VAC placement (12/06/2019); re-exploration w/ Abthera VAC replacement (12/08/2019); re-exploration, small bowel resection of 150 cm (150 cm remaining), ileocecetomy, end ileostomy, mucous fistula, and abdominal closure (12/10/2019); short bowel syndrome, malnutrition, dysphagia  - Dysphagia 1 pureed w/ nectar consistency fluid with TPN, currently undergoing calorie count to determine if TPN can be weanedsaline  - Continue Lomotil, tincture of opium, loperamide, and octreotide for short bowel syndrome  - Protonix to decrease gastric secretions    Renal: no acute issues  - Monitor I&Os  - Monitor electrolytes and replete as necessary    Heme: anemia likely secondary to malnutrition / malabsorption  - Monitor CBC and coags  - Follow up anemia labs  - Lovenox for VTE prophylaxis    ID: oral HSV lesions (s/p acyclovir 12/23/2019-01/02/2020)  - Monitor for clinical evidence of active infection    Endo: no acute issues  - Monitor glucose before meals & at bedtime while on TPN    Disposition:  - Full code  - Will remain in SICU

## 2020-01-14 NOTE — PROGRESS NOTE ADULT - SUBJECTIVE AND OBJECTIVE BOX
cc: Called for worsening respiratory status 20 sec bradycardia      HPI    Subjective:     CC:    VITAL SIGNS  T(F): 99  HR: 93  BP: 120/61  RR: 26  SpO2: 100%    01-13-20 @ 07:01  - 01-14-20 @ 07:00  --------------------------------------------------------  OUT: 1035 mL / NET: -1035 mL    01-14-20 @ 07:01  -  01-14-20 @ 22:10  --------------------------------------------------------  OUT: 1150 mL / NET: -1150 mL      LAB                        8.1    14.19 )-----------( 292      ( 14 Jan 2020 12:43 )             26.2   140  |  101  |  21  ----------------------------<  156<H>  3.8   |  33<H>  |  0.53    CAPILLARY BLOOD GLUCOSE    DIAGNOSTICS    MEDICATIONS  acetaminophen   Tablet .. 975 milliGRAM(s) Oral every 6 hours  albuterol/ipratropium for Nebulization. 3 milliLiter(s) Nebulizer every 6 hours  buDESOnide    Inhalation Suspension 0.5 milliGRAM(s) Inhalation every 12 hours  chlorhexidine 2% Cloths 1 Application(s) Topical <User Schedule>  cholestyramine Powder (Sugar-Free) 4 Gram(s) Oral two times a day  diphenoxylate/atropine 2 Tablet(s) Oral <User Schedule>  enoxaparin Injectable 40 milliGRAM(s) SubCutaneous daily  fat emulsion (Fish Oil and Plant Based) 20% Infusion 12.5 mL/Hr IV Continuous <Continuous>  insulin lispro (HumaLOG) corrective regimen sliding scale   SubCutaneous three times a day before meals  insulin lispro (HumaLOG) corrective regimen sliding scale   SubCutaneous at bedtime  loperamide 16 milliGRAM(s) Oral <User Schedule>  octreotide  Injectable 100 MICROGram(s) SubCutaneous three times a day  opium Tincture 6 milliGRAM(s) Oral <User Schedule>  pantoprazole    Tablet 40 milliGRAM(s) Oral before breakfast  Parenteral Nutrition - Adult 1 Each TPN Continuous <Continuous>  Parenteral Nutrition - Adult 1 Each TPN Continuous <Continuous>  piperacillin/tazobactam IVPB.. 3.375 Gram(s) IV Intermittent every 8 hours  sodium chloride 0.9%. 1000 milliLiter(s) IV Continuous <Continuous>      PHYSICAL EXAM  GEN: No apparent distress, examined in   PSYCH: Appropriate, communicative, A+Ox3  NEURO: Non-focal,  A+Ox 3  CARDIAC: S1 S2 RRR without rub or murmur  Edema +1/4  Pacing wires:  BIPAP  PULMONARY: left pleurex  Abdomen: Soft flat non-tender, non-distended bowel sounds active x 4 LBM:   : clear yellow urine  Skin:  warm dry intact cc: Called for worsening respiratory status requiring BiPap    HPI: 73 y/o M presenting with septic shock and high grade SBO s/p exploratory laparotomy, lysis of adhesions, decompression of bowel via enterotomy w/ primary repair, and Abthera VAC placement on 12/6; s/p take down of Abthera, washout and re-application of the Abthera vac on 12/8. Now s/p SBR and end ileostomy on 12/10 acute respiratory distress with persistent  Pneumothorax s/p pigtail and loculated effusion with persistent drainage   now s/p pleurex on 1/10.   Post-op course complicated by RRT for resp distress, pleurex placed to suction on 1/11, then to waterseal on 1/12.  On 1/13, RRT  called for respiratory distress   plx to pleurvac   back to lws   min drainage  no a/l.  Called this evening at 2200 for an episode of bradycardia and respiratory distress that stabilized with BiPap support. Pleurex with 15 cc/24 hours drainage.  SICU concerned for worsening pulmonary status, anemia requiring 2 U PRBC and increase in left pleural effusion.   Pleurex to pleur-evac, no air leak, sq air appreciated left anterior chest, serosang drainage in pleurex cath, patient comfortable on BiPap 12/7 40% FiO2    VITAL SIGNS  T(F): 99  HR: 93  Tele: atrial fib  BP: 120/61  RR: 26  SpO2: 100%    01-13-20 @ 07:01  -  01-14-20 @ 07:00  --------------------------------------------------------  OUT: 1035 mL / NET: -1035 mL    01-14-20 @ 07:01  -  01-14-20 @ 22:10  --------------------------------------------------------  OUT: 1150 mL / NET: -1150 mL      LAB                        8.1    14.19 )-----------( 292      ( 14 Jan 2020 12:43 )             26.2   140  |  101  |  21  ----------------------------<  156<H>  3.8   |  33<H>  |  0.53    CAPILLARY BLOOD GLUCOSE    DIAGNOSTICS    MEDICATIONS  acetaminophen   Tablet .. 975 milliGRAM(s) Oral every 6 hours  albuterol/ipratropium for Nebulization. 3 milliLiter(s) Nebulizer every 6 hours  buDESOnide    Inhalation Suspension 0.5 milliGRAM(s) Inhalation every 12 hours  chlorhexidine 2% Cloths 1 Application(s) Topical <User Schedule>  cholestyramine Powder (Sugar-Free) 4 Gram(s) Oral two times a day  diphenoxylate/atropine 2 Tablet(s) Oral <User Schedule>  enoxaparin Injectable 40 milliGRAM(s) SubCutaneous daily  fat emulsion (Fish Oil and Plant Based) 20% Infusion 12.5 mL/Hr IV Continuous <Continuous>  insulin lispro (HumaLOG) corrective regimen sliding scale   SubCutaneous three times a day before meals  insulin lispro (HumaLOG) corrective regimen sliding scale   SubCutaneous at bedtime  loperamide 16 milliGRAM(s) Oral <User Schedule>  octreotide  Injectable 100 MICROGram(s) SubCutaneous three times a day  opium Tincture 6 milliGRAM(s) Oral <User Schedule>  pantoprazole    Tablet 40 milliGRAM(s) Oral before breakfast  Parenteral Nutrition - Adult 1 Each TPN Continuous <Continuous>  Parenteral Nutrition - Adult 1 Each TPN Continuous <Continuous>  piperacillin/tazobactam IVPB.. 3.375 Gram(s) IV Intermittent every 8 hours  sodium chloride 0.9%. 1000 milliLiter(s) IV Continuous <Continuous>      PHYSICAL EXAM  GEN: No apparent distress, examined in bed  PSYCH: Appropriate, communicative, A+Ox3  NEURO: Non-focal   CARDIAC: S1 S2 RRR without rub or murmur  Edema +1/4  Pacing wires:  BIPAP decreased left base  PULMONARY: left pleurex to pleurevac LWS/neg air leak,   Abdomen: Soft flat non-tender, non-distended bowel sounds active x 4   : clear yellow urine  Skin:  warm dry intact

## 2020-01-15 ENCOUNTER — RESULT REVIEW (OUTPATIENT)
Age: 75
End: 2020-01-15

## 2020-01-15 LAB
ALBUMIN SERPL ELPH-MCNC: 2.4 G/DL — LOW (ref 3.3–5)
ALBUMIN SERPL ELPH-MCNC: 2.4 G/DL — LOW (ref 3.3–5)
ALP SERPL-CCNC: 61 U/L — SIGNIFICANT CHANGE UP (ref 40–120)
ALP SERPL-CCNC: 63 U/L — SIGNIFICANT CHANGE UP (ref 40–120)
ALT FLD-CCNC: 13 U/L — SIGNIFICANT CHANGE UP (ref 10–45)
ALT FLD-CCNC: 15 U/L — SIGNIFICANT CHANGE UP (ref 10–45)
ANION GAP SERPL CALC-SCNC: 13 MMOL/L — SIGNIFICANT CHANGE UP (ref 5–17)
ANION GAP SERPL CALC-SCNC: 13 MMOL/L — SIGNIFICANT CHANGE UP (ref 5–17)
ANION GAP SERPL CALC-SCNC: 7 MMOL/L — SIGNIFICANT CHANGE UP (ref 5–17)
AST SERPL-CCNC: 14 U/L — SIGNIFICANT CHANGE UP (ref 10–40)
AST SERPL-CCNC: 14 U/L — SIGNIFICANT CHANGE UP (ref 10–40)
BASE EXCESS BLDA CALC-SCNC: 11.3 MMOL/L — HIGH (ref -2–2)
BILIRUB DIRECT SERPL-MCNC: 0.3 MG/DL — HIGH (ref 0–0.2)
BILIRUB DIRECT SERPL-MCNC: 0.6 MG/DL — HIGH (ref 0–0.2)
BILIRUB INDIRECT FLD-MCNC: 0.4 MG/DL — SIGNIFICANT CHANGE UP (ref 0.2–1)
BILIRUB INDIRECT FLD-MCNC: 0.4 MG/DL — SIGNIFICANT CHANGE UP (ref 0.2–1)
BILIRUB SERPL-MCNC: 0.7 MG/DL — SIGNIFICANT CHANGE UP (ref 0.2–1.2)
BILIRUB SERPL-MCNC: 1 MG/DL — SIGNIFICANT CHANGE UP (ref 0.2–1.2)
BLD GP AB SCN SERPL QL: NEGATIVE — SIGNIFICANT CHANGE UP
BUN SERPL-MCNC: 18 MG/DL — SIGNIFICANT CHANGE UP (ref 7–23)
BUN SERPL-MCNC: 20 MG/DL — SIGNIFICANT CHANGE UP (ref 7–23)
BUN SERPL-MCNC: 20 MG/DL — SIGNIFICANT CHANGE UP (ref 7–23)
CALCIUM SERPL-MCNC: 7.4 MG/DL — LOW (ref 8.4–10.5)
CALCIUM SERPL-MCNC: 7.9 MG/DL — LOW (ref 8.4–10.5)
CALCIUM SERPL-MCNC: 8.3 MG/DL — LOW (ref 8.4–10.5)
CHLORIDE SERPL-SCNC: 100 MMOL/L — SIGNIFICANT CHANGE UP (ref 96–108)
CHLORIDE SERPL-SCNC: 95 MMOL/L — LOW (ref 96–108)
CHLORIDE SERPL-SCNC: 96 MMOL/L — SIGNIFICANT CHANGE UP (ref 96–108)
CK MB CFR SERPL CALC: 7.8 NG/ML — HIGH (ref 0–6.7)
CK SERPL-CCNC: 49 U/L — SIGNIFICANT CHANGE UP (ref 30–200)
CO2 BLDA-SCNC: 41 MMOL/L — HIGH (ref 22–30)
CO2 SERPL-SCNC: 30 MMOL/L — SIGNIFICANT CHANGE UP (ref 22–31)
CO2 SERPL-SCNC: 33 MMOL/L — HIGH (ref 22–31)
CO2 SERPL-SCNC: 37 MMOL/L — HIGH (ref 22–31)
CREAT SERPL-MCNC: 0.42 MG/DL — LOW (ref 0.5–1.3)
CREAT SERPL-MCNC: 0.42 MG/DL — LOW (ref 0.5–1.3)
CREAT SERPL-MCNC: 0.5 MG/DL — SIGNIFICANT CHANGE UP (ref 0.5–1.3)
GAS PNL BLDA: SIGNIFICANT CHANGE UP
GLUCOSE BLDC GLUCOMTR-MCNC: 108 MG/DL — HIGH (ref 70–99)
GLUCOSE BLDC GLUCOMTR-MCNC: 172 MG/DL — HIGH (ref 70–99)
GLUCOSE BLDC GLUCOMTR-MCNC: 173 MG/DL — HIGH (ref 70–99)
GLUCOSE BLDC GLUCOMTR-MCNC: 189 MG/DL — HIGH (ref 70–99)
GLUCOSE SERPL-MCNC: 140 MG/DL — HIGH (ref 70–99)
GLUCOSE SERPL-MCNC: 151 MG/DL — HIGH (ref 70–99)
GLUCOSE SERPL-MCNC: 173 MG/DL — HIGH (ref 70–99)
HCO3 BLDA-SCNC: 38 MMOL/L — HIGH (ref 21–29)
HCT VFR BLD CALC: 23.6 % — LOW (ref 39–50)
HCT VFR BLD CALC: 23.8 % — LOW (ref 39–50)
HCT VFR BLD CALC: 27.7 % — LOW (ref 39–50)
HGB BLD-MCNC: 7.4 G/DL — LOW (ref 13–17)
HGB BLD-MCNC: 7.4 G/DL — LOW (ref 13–17)
HGB BLD-MCNC: 9 G/DL — LOW (ref 13–17)
HIV 1+2 AB+HIV1 P24 AG SERPL QL IA: SIGNIFICANT CHANGE UP
HOROWITZ INDEX BLDA+IHG-RTO: 100 — SIGNIFICANT CHANGE UP
LDH SERPL L TO P-CCNC: 176 U/L — SIGNIFICANT CHANGE UP (ref 50–242)
MAGNESIUM SERPL-MCNC: 1.7 MG/DL — SIGNIFICANT CHANGE UP (ref 1.6–2.6)
MAGNESIUM SERPL-MCNC: 2 MG/DL — SIGNIFICANT CHANGE UP (ref 1.6–2.6)
MAGNESIUM SERPL-MCNC: 2.3 MG/DL — SIGNIFICANT CHANGE UP (ref 1.6–2.6)
MCHC RBC-ENTMCNC: 30.3 PG — SIGNIFICANT CHANGE UP (ref 27–34)
MCHC RBC-ENTMCNC: 30.3 PG — SIGNIFICANT CHANGE UP (ref 27–34)
MCHC RBC-ENTMCNC: 31 PG — SIGNIFICANT CHANGE UP (ref 27–34)
MCHC RBC-ENTMCNC: 31.1 GM/DL — LOW (ref 32–36)
MCHC RBC-ENTMCNC: 31.4 GM/DL — LOW (ref 32–36)
MCHC RBC-ENTMCNC: 32.5 GM/DL — SIGNIFICANT CHANGE UP (ref 32–36)
MCV RBC AUTO: 95.5 FL — SIGNIFICANT CHANGE UP (ref 80–100)
MCV RBC AUTO: 96.7 FL — SIGNIFICANT CHANGE UP (ref 80–100)
MCV RBC AUTO: 97.5 FL — SIGNIFICANT CHANGE UP (ref 80–100)
NRBC # BLD: 0 /100 WBCS — SIGNIFICANT CHANGE UP (ref 0–0)
PCO2 BLDA: 74 MMHG — CRITICAL HIGH (ref 32–46)
PH BLDA: 7.33 — LOW (ref 7.35–7.45)
PHOSPHATE SERPL-MCNC: 2.7 MG/DL — SIGNIFICANT CHANGE UP (ref 2.5–4.5)
PHOSPHATE SERPL-MCNC: 3.5 MG/DL — SIGNIFICANT CHANGE UP (ref 2.5–4.5)
PHOSPHATE SERPL-MCNC: 3.8 MG/DL — SIGNIFICANT CHANGE UP (ref 2.5–4.5)
PLATELET # BLD AUTO: 247 K/UL — SIGNIFICANT CHANGE UP (ref 150–400)
PLATELET # BLD AUTO: 271 K/UL — SIGNIFICANT CHANGE UP (ref 150–400)
PLATELET # BLD AUTO: 306 K/UL — SIGNIFICANT CHANGE UP (ref 150–400)
PO2 BLDA: 68 MMHG — LOW (ref 74–108)
POTASSIUM SERPL-MCNC: 3.8 MMOL/L — SIGNIFICANT CHANGE UP (ref 3.5–5.3)
POTASSIUM SERPL-MCNC: 4.2 MMOL/L — SIGNIFICANT CHANGE UP (ref 3.5–5.3)
POTASSIUM SERPL-MCNC: 4.4 MMOL/L — SIGNIFICANT CHANGE UP (ref 3.5–5.3)
POTASSIUM SERPL-SCNC: 3.8 MMOL/L — SIGNIFICANT CHANGE UP (ref 3.5–5.3)
POTASSIUM SERPL-SCNC: 4.2 MMOL/L — SIGNIFICANT CHANGE UP (ref 3.5–5.3)
POTASSIUM SERPL-SCNC: 4.4 MMOL/L — SIGNIFICANT CHANGE UP (ref 3.5–5.3)
PREALB SERPL-MCNC: 15 MG/DL — LOW (ref 20–40)
PROT SERPL-MCNC: 5.6 G/DL — LOW (ref 6–8.3)
PROT SERPL-MCNC: 5.9 G/DL — LOW (ref 6–8.3)
RBC # BLD: 2.44 M/UL — LOW (ref 4.2–5.8)
RBC # BLD: 2.44 M/UL — LOW (ref 4.2–5.8)
RBC # BLD: 2.9 M/UL — LOW (ref 4.2–5.8)
RBC # FLD: 14.3 % — SIGNIFICANT CHANGE UP (ref 10.3–14.5)
RBC # FLD: 14.9 % — HIGH (ref 10.3–14.5)
RBC # FLD: 15.2 % — HIGH (ref 10.3–14.5)
RH IG SCN BLD-IMP: POSITIVE — SIGNIFICANT CHANGE UP
SAO2 % BLDA: 94 % — SIGNIFICANT CHANGE UP (ref 92–96)
SODIUM SERPL-SCNC: 140 MMOL/L — SIGNIFICANT CHANGE UP (ref 135–145)
SODIUM SERPL-SCNC: 141 MMOL/L — SIGNIFICANT CHANGE UP (ref 135–145)
SODIUM SERPL-SCNC: 143 MMOL/L — SIGNIFICANT CHANGE UP (ref 135–145)
TRIGL SERPL-MCNC: 51 MG/DL — SIGNIFICANT CHANGE UP (ref 10–149)
TROPONIN T, HIGH SENSITIVITY RESULT: 32 NG/L — SIGNIFICANT CHANGE UP (ref 0–51)
TROPONIN T, HIGH SENSITIVITY RESULT: 35 NG/L — SIGNIFICANT CHANGE UP (ref 0–51)
WBC # BLD: 13.6 K/UL — HIGH (ref 3.8–10.5)
WBC # BLD: 13.68 K/UL — HIGH (ref 3.8–10.5)
WBC # BLD: 15.44 K/UL — HIGH (ref 3.8–10.5)
WBC # FLD AUTO: 13.6 K/UL — HIGH (ref 3.8–10.5)
WBC # FLD AUTO: 13.68 K/UL — HIGH (ref 3.8–10.5)
WBC # FLD AUTO: 15.44 K/UL — HIGH (ref 3.8–10.5)

## 2020-01-15 PROCEDURE — 71045 X-RAY EXAM CHEST 1 VIEW: CPT | Mod: 26,77

## 2020-01-15 PROCEDURE — 99291 CRITICAL CARE FIRST HOUR: CPT

## 2020-01-15 PROCEDURE — 99024 POSTOP FOLLOW-UP VISIT: CPT

## 2020-01-15 PROCEDURE — 93010 ELECTROCARDIOGRAM REPORT: CPT

## 2020-01-15 PROCEDURE — 93010 ELECTROCARDIOGRAM REPORT: CPT | Mod: 77

## 2020-01-15 PROCEDURE — 31624 DX BRONCHOSCOPE/LAVAGE: CPT

## 2020-01-15 PROCEDURE — 32552 REMOVE LUNG CATHETER: CPT

## 2020-01-15 PROCEDURE — 99233 SBSQ HOSP IP/OBS HIGH 50: CPT

## 2020-01-15 PROCEDURE — 32653 THORACOSCOPY REMOV FB/FIBRIN: CPT

## 2020-01-15 PROCEDURE — 71045 X-RAY EXAM CHEST 1 VIEW: CPT | Mod: 26

## 2020-01-15 PROCEDURE — 99232 SBSQ HOSP IP/OBS MODERATE 35: CPT | Mod: 25

## 2020-01-15 PROCEDURE — 74177 CT ABD & PELVIS W/CONTRAST: CPT | Mod: 26

## 2020-01-15 PROCEDURE — 32651 THORACOSCOPY REMOVE CORTEX: CPT

## 2020-01-15 PROCEDURE — 36620 INSERTION CATHETER ARTERY: CPT

## 2020-01-15 PROCEDURE — 88305 TISSUE EXAM BY PATHOLOGIST: CPT | Mod: 26

## 2020-01-15 PROCEDURE — 71260 CT THORAX DX C+: CPT | Mod: 26

## 2020-01-15 RX ORDER — MAGNESIUM SULFATE 500 MG/ML
2 VIAL (ML) INJECTION ONCE
Refills: 0 | Status: COMPLETED | OUTPATIENT
Start: 2020-01-15 | End: 2020-01-15

## 2020-01-15 RX ORDER — FENTANYL CITRATE 50 UG/ML
0.5 INJECTION INTRAVENOUS
Qty: 5000 | Refills: 0 | Status: DISCONTINUED | OUTPATIENT
Start: 2020-01-15 | End: 2020-01-16

## 2020-01-15 RX ORDER — VASOPRESSIN 20 [USP'U]/ML
0.03 INJECTION INTRAVENOUS
Qty: 50 | Refills: 0 | Status: DISCONTINUED | OUTPATIENT
Start: 2020-01-15 | End: 2020-01-15

## 2020-01-15 RX ORDER — SODIUM CHLORIDE 9 MG/ML
1000 INJECTION INTRAMUSCULAR; INTRAVENOUS; SUBCUTANEOUS
Refills: 0 | Status: DISCONTINUED | OUTPATIENT
Start: 2020-01-15 | End: 2020-01-17

## 2020-01-15 RX ORDER — NOREPINEPHRINE BITARTRATE/D5W 8 MG/250ML
0.5 PLASTIC BAG, INJECTION (ML) INTRAVENOUS
Qty: 8 | Refills: 0 | Status: DISCONTINUED | OUTPATIENT
Start: 2020-01-15 | End: 2020-01-17

## 2020-01-15 RX ORDER — HYDROMORPHONE HYDROCHLORIDE 2 MG/ML
0.5 INJECTION INTRAMUSCULAR; INTRAVENOUS; SUBCUTANEOUS ONCE
Refills: 0 | Status: DISCONTINUED | OUTPATIENT
Start: 2020-01-15 | End: 2020-01-15

## 2020-01-15 RX ORDER — PROPOFOL 10 MG/ML
20 INJECTION, EMULSION INTRAVENOUS
Qty: 500 | Refills: 0 | Status: DISCONTINUED | OUTPATIENT
Start: 2020-01-15 | End: 2020-01-16

## 2020-01-15 RX ORDER — DIPHENHYDRAMINE HCL 50 MG
50 CAPSULE ORAL ONCE
Refills: 0 | Status: COMPLETED | OUTPATIENT
Start: 2020-01-15 | End: 2020-01-15

## 2020-01-15 RX ORDER — PROPOFOL 10 MG/ML
20 INJECTION, EMULSION INTRAVENOUS
Qty: 1000 | Refills: 0 | Status: DISCONTINUED | OUTPATIENT
Start: 2020-01-15 | End: 2020-01-15

## 2020-01-15 RX ORDER — ELECTROLYTE SOLUTION,INJ
1 VIAL (ML) INTRAVENOUS
Refills: 0 | Status: DISCONTINUED | OUTPATIENT
Start: 2020-01-15 | End: 2020-01-15

## 2020-01-15 RX ORDER — INSULIN LISPRO 100/ML
VIAL (ML) SUBCUTANEOUS EVERY 6 HOURS
Refills: 0 | Status: DISCONTINUED | OUTPATIENT
Start: 2020-01-15 | End: 2020-01-19

## 2020-01-15 RX ORDER — POTASSIUM CHLORIDE 20 MEQ
20 PACKET (EA) ORAL ONCE
Refills: 0 | Status: COMPLETED | OUTPATIENT
Start: 2020-01-15 | End: 2020-01-15

## 2020-01-15 RX ORDER — FENTANYL CITRATE 50 UG/ML
50 INJECTION INTRAVENOUS ONCE
Refills: 0 | Status: DISCONTINUED | OUTPATIENT
Start: 2020-01-15 | End: 2020-01-15

## 2020-01-15 RX ORDER — MIDAZOLAM HYDROCHLORIDE 1 MG/ML
2 INJECTION, SOLUTION INTRAMUSCULAR; INTRAVENOUS ONCE
Refills: 0 | Status: DISCONTINUED | OUTPATIENT
Start: 2020-01-15 | End: 2020-01-15

## 2020-01-15 RX ORDER — I.V. FAT EMULSION 20 G/100ML
12.5 EMULSION INTRAVENOUS
Qty: 30 | Refills: 0 | Status: DISCONTINUED | OUTPATIENT
Start: 2020-01-15 | End: 2020-01-15

## 2020-01-15 RX ORDER — CHLORHEXIDINE GLUCONATE 213 G/1000ML
15 SOLUTION TOPICAL EVERY 12 HOURS
Refills: 0 | Status: DISCONTINUED | OUTPATIENT
Start: 2020-01-15 | End: 2020-01-16

## 2020-01-15 RX ADMIN — CHLORHEXIDINE GLUCONATE 1 APPLICATION(S): 213 SOLUTION TOPICAL at 05:36

## 2020-01-15 RX ADMIN — HYDROMORPHONE HYDROCHLORIDE 0.5 MILLIGRAM(S): 2 INJECTION INTRAMUSCULAR; INTRAVENOUS; SUBCUTANEOUS at 15:04

## 2020-01-15 RX ADMIN — Medication 0.5 MILLIGRAM(S): at 05:05

## 2020-01-15 RX ADMIN — Medication 3 MILLILITER(S): at 11:23

## 2020-01-15 RX ADMIN — Medication 3 MILLILITER(S): at 05:05

## 2020-01-15 RX ADMIN — Medication 1: at 07:30

## 2020-01-15 RX ADMIN — PIPERACILLIN AND TAZOBACTAM 25 GRAM(S): 4; .5 INJECTION, POWDER, LYOPHILIZED, FOR SOLUTION INTRAVENOUS at 05:35

## 2020-01-15 RX ADMIN — FENTANYL CITRATE 50 MICROGRAM(S): 50 INJECTION INTRAVENOUS at 16:00

## 2020-01-15 RX ADMIN — Medication 50.62 MICROGRAM(S)/KG/MIN: at 22:44

## 2020-01-15 RX ADMIN — FENTANYL CITRATE 1.35 MICROGRAM(S)/KG/HR: 50 INJECTION INTRAVENOUS at 22:45

## 2020-01-15 RX ADMIN — OCTREOTIDE ACETATE 100 MICROGRAM(S): 200 INJECTION, SOLUTION INTRAVENOUS; SUBCUTANEOUS at 22:44

## 2020-01-15 RX ADMIN — HYDROMORPHONE HYDROCHLORIDE 0.5 MILLIGRAM(S): 2 INJECTION INTRAMUSCULAR; INTRAVENOUS; SUBCUTANEOUS at 15:25

## 2020-01-15 RX ADMIN — Medication 32 MILLIGRAM(S): at 10:14

## 2020-01-15 RX ADMIN — Medication 1: at 12:20

## 2020-01-15 RX ADMIN — PIPERACILLIN AND TAZOBACTAM 25 GRAM(S): 4; .5 INJECTION, POWDER, LYOPHILIZED, FOR SOLUTION INTRAVENOUS at 22:44

## 2020-01-15 RX ADMIN — Medication 50 GRAM(S): at 05:35

## 2020-01-15 RX ADMIN — OCTREOTIDE ACETATE 100 MICROGRAM(S): 200 INJECTION, SOLUTION INTRAVENOUS; SUBCUTANEOUS at 05:36

## 2020-01-15 RX ADMIN — Medication 50 MILLIEQUIVALENT(S): at 05:35

## 2020-01-15 RX ADMIN — FENTANYL CITRATE 50 MICROGRAM(S): 50 INJECTION INTRAVENOUS at 15:45

## 2020-01-15 RX ADMIN — Medication 50 MILLIGRAM(S): at 11:02

## 2020-01-15 NOTE — PROGRESS NOTE ADULT - ASSESSMENT
75M active smoker with COPD (not on home O2), active EtOH abuse admitted 12/6 with SBO.  Hospital course complicated by CHI, delirium, short gut syndrome, pneumothorax, episodes of hypotension, hypoxia, rash, anemia and transfusion requirements.      12/6/19 - OR for ex-lap, SANDRA and decompression enterotomy with primary repair - dusky area of bowel noted  12/8/19 - RTOR for washout, areas of ischemia of concern  12/10/19 - RTOR for partial colectomy, mucous fistula, end ileostomy  12/11/19 - extubated  12/15/19 - pneumothorax s/p L CT  12/30/19 - CT by IR  1/10/2020 - VATS with pleurex catheter insertion for drainage of pleural effusion.  1/10/2020 - rash - derm - miliaria rubra due to sweat trapping on the back  1/13/20/20 - RRT - hypoxia, hypotension, leukocytosis, no fever, drop in H/H    After RRT, cultures sent, zosyn started, CXR with increased LLL opacity.  Given PRBC.  Elevated fungitell noted.  Significance is not clear.  Though he is on TPN, fungal blood cultures are negative.  Not really at risk for PJP though HIV status is not known.  He is not really at risk for invasive aspergillosis.    Fungitell  - f/u HIV testing  - check LDH  - if both are (+) or elevated, can evaluate for PJP  - f/u fungal blood cultures  - f/u galactomannen    Leukocytosis  - possible underlying pneumonia  - zosyn okay for now  - f/u all cultures  - trend cbc    Pneumonia  - f/u sputum cultures

## 2020-01-15 NOTE — PROGRESS NOTE ADULT - SUBJECTIVE AND OBJECTIVE BOX
[facility] NUTRITION SUPPORT / TPN -- FOLLOW UP NOTE  --------------------------------------------------------------------------------    24 hour events/subjective:    Pt remains in SICU- sat'ing well on NC O2  Pt w/ low h/h-->transfused 1U pRBC with appropriate response  S/p lt VATS and pleurax catheter placement on Friday,        improvement w/ Lt CT placed to suction & Lasix- breathing more easily  Pt continues w/decreased Ileostomy output- on regular/ soft mechanical diet            replacement with 0.5 cc per cc output with NS            continue lomotil, imodium, octreotide, tincture of opium  Tolerating TPN  Leukocytosis, Zosyn- recent cultures remain neg  Pt tolerating  diet- eating well, w/o n/v       nervous to drink clears- discussed options  Pt denies any pain/ cough/ palp/ sob/ dyspnea  No f/c/s      Diet:  Diet, Mechanical Soft:   Supplement Feeding Modality:  Oral  Ensure Enlive Cans or Servings Per Day:  3       Frequency:  Three Times a day (01-14-20 @ 15:45)  Diet, NPO after Midnight:      NPO Start Date: 14-Jan-2020,   NPO Start Time: 23:59 (01-14-20 @ 22:21)      Appetite: [  ]Poor [ x ]Adequate [  ]Good  Caloric intake:  [ x  ]  Adequate   [   ] Inadequate    ROS: General/ GI see HPI  all other systems negative      ALLERGIES & MEDICATIONS  --------------------------------------------------------------------------------  ALLERGIES  IV Contrast (Hives)        STANDING INPATIENT MEDICATIONS    acetaminophen   Tablet .. 975 milliGRAM(s) Oral every 6 hours  albuterol/ipratropium for Nebulization. 3 milliLiter(s) Nebulizer every 6 hours  buDESOnide    Inhalation Suspension 0.5 milliGRAM(s) Inhalation every 12 hours  chlorhexidine 2% Cloths 1 Application(s) Topical <User Schedule>  diphenoxylate/atropine 2 Tablet(s) Oral <User Schedule>  enoxaparin Injectable 40 milliGRAM(s) SubCutaneous daily  fat emulsion (Fish Oil and Plant Based) 20% Infusion 12.5 mL/Hr IV Continuous <Continuous>  insulin lispro (HumaLOG) corrective regimen sliding scale   SubCutaneous three times a day before meals  insulin lispro (HumaLOG) corrective regimen sliding scale   SubCutaneous at bedtime  loperamide 16 milliGRAM(s) Oral <User Schedule>  octreotide  Injectable 100 MICROGram(s) SubCutaneous three times a day  opium Tincture 6 milliGRAM(s) Oral <User Schedule>  pantoprazole    Tablet 40 milliGRAM(s) Oral before breakfast  Parenteral Nutrition - Adult 1 Each TPN Continuous <Continuous>  Parenteral Nutrition - Adult 1 Each TPN Continuous <Continuous>  piperacillin/tazobactam IVPB.. 3.375 Gram(s) IV Intermittent every 8 hours  sodium chloride 0.9%. 1000 milliLiter(s) IV Continuous <Continuous>      PRN INPATIENT MEDICATION  ondansetron Injectable 4 milliGRAM(s) IV Push every 6 hours PRN        VITALS/PHYSICAL EXAM  --------------------------------------------------------------------------------  T(C): 36.2 (01-15-20 @ 11:00), Max: 37.4 (01-15-20 @ 03:00)  HR: 84 (01-15-20 @ 13:42) (31 - 142)  BP: 130/60 (01-15-20 @ 13:33) (94/51 - 186/92)  RR: 23 (01-15-20 @ 13:33) (14 - 41)  SpO2: 99% (01-15-20 @ 13:42) (83% - 100%)  Wt(kg): --        01-14-20 @ 07:01  -  01-15-20 @ 07:00  --------------------------------------------------------  IN: 2237.5 mL / OUT: 2075 mL / NET: 162.5 mL    01-15-20 @ 07:01  -  01-15-20 @ 13:50  --------------------------------------------------------  IN: 435 mL / OUT: 20 mL / NET: 415 mL    PHYSICAL EXAM  --------------------------------------------------------------------------------  	Gen: guarded but stable, A&Ox3, NC O2  	HEENT: NC/AT, PERRL, supple neck, clear oropharynx              Chest:  CTA, Lt chest pleureX Cath to wall suction, dressing c/d/i  	GI: (+) BS, softly distended, non tender                   midline incision c/d/i w/o drainage                   (+)ostomy pink viable- thick light brown liquid stool like material              MSK: FROM x4, no contractures nor deformities  	Vascular: Equally Warm, (+)BLE edema, no edema BUE,  no clubbing, cyanosis,                        Rt PICC dressing c/d/I w/o swelling or s/sx infection   	Neuro: No focal deficits, intact sensation, weakened strength  	Psych: Normal affect and mood        LABS/ CULTURES/ RADIOLOGY:              7.4    13.60 >-----------<  271      [01-15-20 @ 01:44]              23.6     141  |  95  |  18  ----------------------------<  173      [01-15-20 @ 01:44]  3.8   |  33  |  0.50        Ca     7.9     [01-15-20 @ 01:44]      Mg     1.7     [01-15-20 @ 01:44]      Phos  3.8     [01-15-20 @ 01:44]    TPro  5.9  /  Alb  2.4  /  TBili  0.7  /  DBili  0.3  /  AST  14  /  ALT  13  /  AlkPhos  61  [01-15-20 @ 01:44]      CK 49      [01-15-20 @ 10:07]        [01-15-20 @ 01:44]    Blood Gas Arterial - Calcium, Ionized: 1.13 mmoL/L (01-14-20 @ 03:27)    CAPILLARY BLOOD GLUCOSE  POCT Blood Glucose.: 173 mg/dL (15 Isma 2020 12:19)  POCT Blood Glucose.: 172 mg/dL (15 Isma 2020 10:32)  POCT Blood Glucose.: 189 mg/dL (15 Isma 2020 07:28)  POCT Blood Glucose.: 151 mg/dL (14 Jan 2020 22:55)  POCT Blood Glucose.: 169 mg/dL (14 Jan 2020 17:49)    Prealbumin, Serum: 15 mg/dL (01-15-20 @ 09:01)  Prealbumin, Serum: 13 mg/dL (01-14-20 @ 07:29)  Prealbumin, Serum: 13 mg/dL (01-07-20 @ 02:35)  Prealbumin, Serum: 14 mg/dL (01-06-20 @ 07:40)  Prealbumin, Serum: 16 mg/dL (01-01-20 @ 02:48)        ASSESSMENT/PLAN:  75y Male      1. Protein calorie malnutrition being optimized with TPN:  AA  [ ] gm. CHO [ ] gm. Lipids [ ] gm.  2.  Hyperglycemia managed with: [ ] units of regular insulin    3. Check fluid balance daily.  Strict I/O  [ ] [ ]   4.  Daily BMP, Ionized Calcium, Magnesium and Phosphorous   5.  Triglycerides x 3 days at initiation of TPN then monthly [ ]     TPN pager [phone], spectra [phone]  d/w Dr. Chacko and Dr. MELISSA Siegel [facility] NUTRITION SUPPORT / TPN -- FOLLOW UP NOTE  --------------------------------------------------------------------------------    24 hour events/subjective:    Pt remains in SICU-          overnight pt was bradycardic w/ respiratory distressed that improved w/ BiPAP/ CPAP   S/p lt VATS and pleurax catheter placement on 1/10/2020         CT placed to LWS & Lasix- breathing more easily  Pt continues w/decreased Ileostomy output- had been on regular/ soft mechanical diet            replacement with 0.5 cc per cc output with NS            continue lomotil, imodium, octreotide, tincture of opium  Tolerating reduced TPN  Leukocytosis, Zosyn- recent cultures remain neg  Pt ad been tolerating  diet- currently NPO  No  n/v, ostomy functioning  Pt denies any pain/ cough/ palp/ sob/ dyspnea  No f/c/s      Diet:  Diet, Mechanical Soft:   Supplement Feeding Modality:  Oral  Ensure Enlive Cans or Servings Per Day:  3       Frequency:  Three Times a day (01-14-20 @ 15:45)  Diet, NPO after Midnight:      NPO Start Date: 14-Jan-2020,   NPO Start Time: 23:59 (01-14-20 @ 22:21)      Appetite: [  ]Poor [ x ]Adequate [  ]Good  Caloric intake:  [ x  ]  Adequate   [   ] Inadequate    ROS: General/ GI see HPI  all other systems negative      ALLERGIES & MEDICATIONS  --------------------------------------------------------------------------------  ALLERGIES  IV Contrast (Hives)        STANDING INPATIENT MEDICATIONS    acetaminophen   Tablet .. 975 milliGRAM(s) Oral every 6 hours  albuterol/ipratropium for Nebulization. 3 milliLiter(s) Nebulizer every 6 hours  buDESOnide    Inhalation Suspension 0.5 milliGRAM(s) Inhalation every 12 hours  chlorhexidine 2% Cloths 1 Application(s) Topical <User Schedule>  diphenoxylate/atropine 2 Tablet(s) Oral <User Schedule>  enoxaparin Injectable 40 milliGRAM(s) SubCutaneous daily  fat emulsion (Fish Oil and Plant Based) 20% Infusion 12.5 mL/Hr IV Continuous <Continuous>  insulin lispro (HumaLOG) corrective regimen sliding scale   SubCutaneous three times a day before meals  insulin lispro (HumaLOG) corrective regimen sliding scale   SubCutaneous at bedtime  loperamide 16 milliGRAM(s) Oral <User Schedule>  octreotide  Injectable 100 MICROGram(s) SubCutaneous three times a day  opium Tincture 6 milliGRAM(s) Oral <User Schedule>  pantoprazole    Tablet 40 milliGRAM(s) Oral before breakfast  Parenteral Nutrition - Adult 1 Each TPN Continuous <Continuous>  Parenteral Nutrition - Adult 1 Each TPN Continuous <Continuous>  piperacillin/tazobactam IVPB.. 3.375 Gram(s) IV Intermittent every 8 hours  sodium chloride 0.9%. 1000 milliLiter(s) IV Continuous <Continuous>      PRN INPATIENT MEDICATION  ondansetron Injectable 4 milliGRAM(s) IV Push every 6 hours PRN        VITALS/PHYSICAL EXAM  --------------------------------------------------------------------------------  T(C): 36.2 (01-15-20 @ 11:00), Max: 37.4 (01-15-20 @ 03:00)  HR: 84 (01-15-20 @ 13:42) (31 - 142)  BP: 130/60 (01-15-20 @ 13:33) (94/51 - 186/92)  RR: 23 (01-15-20 @ 13:33) (14 - 41)  SpO2: 99% (01-15-20 @ 13:42) (83% - 100%)  Wt(kg): --        01-14-20 @ 07:01  -  01-15-20 @ 07:00  --------------------------------------------------------  IN: 2237.5 mL / OUT: 2075 mL / NET: 162.5 mL    01-15-20 @ 07:01  -  01-15-20 @ 13:50  --------------------------------------------------------  IN: 435 mL / OUT: 20 mL / NET: 415 mL    PHYSICAL EXAM  --------------------------------------------------------------------------------  	Gen: guarded but stable, A&Ox3, NC O2  	HEENT: NC/AT, PERRL, supple neck, clear oropharynx              Chest:  CTA, Lt chest pleureX Cath to wall suction, dressing c/d/i  	GI: (+) BS, softly distended, non tender                   midline incision c/d/i w/o drainage                   (+)ostomy pink viable- thick light brown liquid stool like material              MSK: FROM x4, no contractures nor deformities  	Vascular: Equally Warm, (+)BLE edema, no edema BUE,  no clubbing, cyanosis,                        Rt PICC dressing c/d/I w/o swelling or s/sx infection   	Neuro: No focal deficits, intact sensation, weakened strength  	Psych: Normal affect and mood        LABS/ CULTURES/ RADIOLOGY:              7.4    13.60 >-----------<  271      [01-15-20 @ 01:44]              23.6     141  |  95  |  18  ----------------------------<  173      [01-15-20 @ 01:44]  3.8   |  33  |  0.50        Ca     7.9     [01-15-20 @ 01:44]      Mg     1.7     [01-15-20 @ 01:44]      Phos  3.8     [01-15-20 @ 01:44]    TPro  5.9  /  Alb  2.4  /  TBili  0.7  /  DBili  0.3  /  AST  14  /  ALT  13  /  AlkPhos  61  [01-15-20 @ 01:44]      CK 49      [01-15-20 @ 10:07]        [01-15-20 @ 01:44]    Blood Gas Arterial - Calcium, Ionized: 1.13 mmoL/L (01-14-20 @ 03:27)    CAPILLARY BLOOD GLUCOSE  POCT Blood Glucose.: 173 mg/dL (15 Isma 2020 12:19)  POCT Blood Glucose.: 172 mg/dL (15 Isma 2020 10:32)  POCT Blood Glucose.: 189 mg/dL (15 Isma 2020 07:28)  POCT Blood Glucose.: 151 mg/dL (14 Jan 2020 22:55)  POCT Blood Glucose.: 169 mg/dL (14 Jan 2020 17:49)    Prealbumin, Serum: 15 mg/dL (01-15-20 @ 09:01)  Prealbumin, Serum: 13 mg/dL (01-14-20 @ 07:29)  Prealbumin, Serum: 13 mg/dL (01-07-20 @ 02:35)  Prealbumin, Serum: 14 mg/dL (01-06-20 @ 07:40)  Prealbumin, Serum: 16 mg/dL (01-01-20 @ 02:48)      Triglycerides, Serum: 51 mg/dL (01.15.20 @ 01:44)  Triglycerides, Serum: 60 mg/dL (01.14.20 @ 03:31)  Triglycerides, Serum: 50 mg/dL (01.07.20 @ 01:05)  Triglycerides, Serum: 48 mg/dL (01.06.20 @ 02:44)  Triglycerides, Serum: 56 mg/dL (01.01.20 @ 00:42)    Culture - Fungal, Blood (01.13.20 @ 18:15)    Specimen Source: .Blood Blood    Culture Results:   Testing in progress    Culture - Blood (01.13.20 @ 06:13)    Specimen Source: .Blood Blood-Peripheral    Culture Results:   No growth to date.    < from: Xray Chest 1 View- PORTABLE-Routine (01.15.20 @ 07:01) >  mpression:    The heartis normal in size. Left pleural effusion which remain unchanged when compared to previous study done January 14, 2020. Left lower lobe pneumonia and/or atelectasis. No pneumothorax. The right lung appears to be clear. A PICC line is seen on the rightand the tip is in the superior vena cava. No pneumothorax.      < end of copied text >

## 2020-01-15 NOTE — PROGRESS NOTE ADULT - ASSESSMENT
A/P: 74 year old male w/ PMH of COPD and EtOH dependence with PSH/o appy, prostate surgery, & spine surgery  septic shock and high grade SBO s/p exploratory laparotomy, lysis of adhesions, decompression of bowel via enterotomy w/ primary repair, and Abthera VAC placement on 12/6; s/p take down of Abthera, washout and re-application of the Abthera vac on 12/8. Now s/p SBR and end ileostomy on 12/10.   TPN consulted to assist w/ management of pt's nutrition in pt w/ prolonged hospital course now tolerating diet but has high Ileostomy output.  Pt s/p Lt Chest Pigtail for effusion in IR with persistent high output on 1/10/2020 pt had Video assisted thorascopic placement of Left PleurX catheter    Pt remains in SICU -off BiPAP, recieved 1U pRBC  Improving Respiratory Acidosis w/compensatory Metabolic Alkalosis  Pt w/ improving severe Protein-Calorie Malnutrition      - Decrease  AA to 60g, Dextrose to 140g, and SMOF 30g in 1200mL vol in TPN      - Fecal Fat results noted- elevated fat levels in stool      - Improving ostomy output- pt eating more regularly- decreasing output -             ICU repleting w/ 0.5:1 IVF q6h      - Diet advanced to Soft Mechanical w/ Protein Supplements            follow up with speech tx - pt 'nervous' to drink thin liquids      - Strict Intake and Output- Continue TPN & octreotide, lomotil, tincture of opium, & imodium             Decreased TPN volume to 1200mL    TPN at GOAL:  120 grams amino acids  210 grams dextrose   50 grams SMOF /lipid   Micronutrients: 10ml MVI, 3ml MTE-5    BLE Edema- Decrease TPN volume & total Sodium in TPN           NaCl decreased to 0mEq and NaAce decreased from 60mEq to 0mEq  Keep KCl of 40 mEq KCl in TPN- pt being given lasix  HypoPhos resolving, continue NaPhos of 45mMol   Leukocytosis- Zosyn, for possible PNA         holding Vanco with close monitoring for renal impairment   Fecal fat testing- results noted         pt with decreased absorption of fat  Hyperglycemia- will continue to monitor-       Fingersticks & ISS coverage - to be ordered by primary team  PICC w/dedicated port for only TPN - maintenance as per protocol  Weights three times a week  Monitor BMP, Mg, Ionized Ca, Phosphorus daily  Continue to monitor Triglycerides and Pre-albumin weekly  Continue as per Surgery, will follow with you, D/w primary team    Andreina Hubbard PA-C  TPN team, pager [phone]  D/w Ana M Siegel A/P: 74 year old male w/ PMH of COPD and EtOH dependence with PSH/o appy, prostate surgery, & spine surgery  septic shock and high grade SBO s/p exploratory laparotomy, lysis of adhesions, decompression of bowel via enterotomy w/ primary repair, and Abthera VAC placement on 12/6; s/p take down of Abthera, washout and re-application of the Abthera vac on 12/8. Now s/p SBR and end ileostomy on 12/10.   TPN consulted to assist w/ management of pt's nutrition in pt w/ prolonged hospital course now tolerating diet but has high Ileostomy output.  Pt s/p Lt Chest Pigtail for effusion in IR with persistent high output on 1/10/2020 pt had Video assisted thorascopic placement of Left PleurX catheter    Pt remains in SICU -respiratory distress w/ tammy cardia overnight - improved with BiPAP/CPAP  Improving Respiratory Acidosis w/compensatory Metabolic Alkalosis  Pt w/ improving severe Protein-Calorie Malnutrition      - Tolerating decreased TPN AA to 60g, Dextrose to 140g, and SMOF 30g in 1200mL vol in TPN      - Fecal Fat results noted- elevated fat levels in stool      - Improving ostomy output- decreased today as pt NPO            ICU repleting w/ 0.5:1 IVF q6h      - Diet advanced to Soft Mechanical w/ Protein Supplements            follow up with speech tx - pt 'nervous' to drink thin liquids      - Strict Intake and Output- Continue TPN & octreotide, lomotil, tincture of opium, & imodium             Decreased TPN volume to 1200mL    Full Strength TPN at GOAL:  120 grams amino acids  210 grams dextrose   50 grams SMOF /lipid   Micronutrients: 10ml MVI, 3ml MTE-5    Lt Pleural Effusion- s/p Lt Pleurx Cath on 1/10-            concern that tube is clogged- possible Hemothorax in light of decreased H/H            CHest CT today- to assess- possible need for OR evacuation / new Chest tube  BLE Edema- Decrease TPN volume & total Sodium in TPN           Improving w/ NaCl & NaAce removed  Continue KCl of 40 mEq KCl in TPN  HypoPhos resolving, continue NaPhos of 45mMol   Leukocytosis- improving on Zosyn, holding Vanco with close monitoring for renal impairment   Fecal fat testing- results noted         pt with decreased absorption of fat  Hyperglycemia-possibly from stress of overnight events will continue to monitor-       Fingersticks & ISS coverage - to be ordered by primary team  PICC w/dedicated port for only TPN - maintenance as per protocol  Weights three times a week  Monitor CMP, Mg, Ionized Ca, Phosphorus daily-  Continue to monitor Triglycerides and Pre-albumin weekly  Continue as per Surgery, will follow with you, D/w primary team    Andreina Hubbard PA-C  TPN team, pager [phone]  D/w Ana M Siegel

## 2020-01-15 NOTE — PROGRESS NOTE ADULT - SUBJECTIVE AND OBJECTIVE BOX
Patient is a 75y old  Male who presents with a chief complaint of abd. pain (15 Isma 2020 09:39)      Vital Signs Last 24 Hrs  T(C): 36.1 (01-15-20 @ 07:00), Max: 37.4 (01-15-20 @ 03:00)  T(F): 97 (01-15-20 @ 07:00), Max: 99.3 (01-15-20 @ 03:00)  HR: 81 (01-15-20 @ 08:21) (49 - 142)  BP: 117/58 (01-15-20 @ 08:00) (94/51 - 163/73)  RR: 26 (01-15-20 @ 08:00) (14 - 33)  SpO2: 100% (01-15-20 @ 08:21) (83% - 100%)            20 @ 07:01  -  01-15-20 @ 07:00  --------------------------------------------------------  IN: 2237.5 mL / OUT: 2075 mL / NET: 162.5 mL        Daily Weight in k.9 (15 Isma 2020 01:57)                          7.4    13.60 )-----------( 271      ( 15 Isma 2020 01:44 )             23.6     141  |  95<L>  |  18  ----------------------------<  173<H>  3.8   |  33<H>  |  0.50        PHYSICAL EXAM  Neurology: A&Ox3, NAD  CV : RRR+S1S2  Lungs: Respirations non-labored, B/L BS CTA  Abdomen: Soft, NT/ND, +BSx4Q  Extremities: B/L LE warm, no edema, +PP           MEDICATIONS  acetaminophen   Tablet .. 975 milliGRAM(s) Oral every 6 hours  albuterol/ipratropium for Nebulization. 3 milliLiter(s) Nebulizer every 6 hours  buDESOnide    Inhalation Suspension 0.5 milliGRAM(s) Inhalation every 12 hours  chlorhexidine 2% Cloths 1 Application(s) Topical <User Schedule>  diphenhydrAMINE   Injectable 50 milliGRAM(s) IV Push once  diphenoxylate/atropine 2 Tablet(s) Oral <User Schedule>  enoxaparin Injectable 40 milliGRAM(s) SubCutaneous daily  fat emulsion (Fish Oil and Plant Based) 20% Infusion 12.5 mL/Hr IV Continuous <Continuous>  insulin lispro (HumaLOG) corrective regimen sliding scale   SubCutaneous three times a day before meals  insulin lispro (HumaLOG) corrective regimen sliding scale   SubCutaneous at bedtime  loperamide 16 milliGRAM(s) Oral <User Schedule>  octreotide  Injectable 100 MICROGram(s) SubCutaneous three times a day  ondansetron Injectable 4 milliGRAM(s) IV Push every 6 hours PRN  opium Tincture 6 milliGRAM(s) Oral <User Schedule>  pantoprazole    Tablet 40 milliGRAM(s) Oral before breakfast  Parenteral Nutrition - Adult 1 Each TPN Continuous <Continuous>  piperacillin/tazobactam IVPB.. 3.375 Gram(s) IV Intermittent every 8 hours  sodium chloride 0.9%. 1000 milliLiter(s) IV Continuous <Continuous>

## 2020-01-15 NOTE — PROVIDER CONTACT NOTE (OTHER) - SITUATION
Pt c/o of SOB & bradycardic to 34 intermittently. Pt abdomen distended. Pt urine color dark brown. Pt pleurx catheter has no drainage.

## 2020-01-15 NOTE — PROCEDURE NOTE - NSINDICATIONS_GEN_A_CORE
critical patient/arterial puncture to obtain ABG's/monitoring purposes
monitoring purposes/arterial puncture to obtain ABG's/blood sampling
pneumothorax

## 2020-01-15 NOTE — PROGRESS NOTE ADULT - SUBJECTIVE AND OBJECTIVE BOX
Patient is a 75y old  Male who presents with a chief complaint of abd. pain (2020 13:14)    f/u leukocytosis and +fungitell    Interval History/ROS:  no fever today.  on bipap.  sob a little better.  chronic cough.  liquid stool from osteomy.  no n/v/d.  no abdominal pain.  no dysuria.  Remainder of ROS otherwise negative.    PAST MEDICAL & SURGICAL HISTORY:  COPD with hypoxia  ETOH abuse  History of lumbosacral spine surgery  History of prostate surgery  History of appendectomy    Allergies  IV Contrast (Hives)    ANTIMICROBIALS:  acyclovir   Oral Tab/Cap (-)  cefTRIAXone   IVPB (12/6 x1)  piperacillin/tazobactam IVPB (-12/15)  ceFAZolin   IVPB (-)  vancomycin  IVPB (1/13 x1)    active  piperacillin/tazobactam IVPB.. 3.375 every 8 hours (-)    MEDICATIONS  (STANDING):  acetaminophen   Tablet .. 975 every 6 hours  albuterol/ipratropium for Nebulization. 3 every 6 hours  buDESOnide    Inhalation Suspension 0.5 every 12 hours  cholestyramine Powder (Sugar-Free) 4 two times a day  diphenhydrAMINE   Injectable 50 once  diphenoxylate/atropine 2 <User Schedule>  enoxaparin Injectable 40 daily  insulin lispro (HumaLOG) corrective regimen sliding scale  three times a day before meals  insulin lispro (HumaLOG) corrective regimen sliding scale  at bedtime  loperamide 16 <User Schedule>  methylPREDNISolone sodium succinate Injectable 32 once  octreotide  Injectable 100 three times a day  opium Tincture 6 <User Schedule>  pantoprazole    Tablet 40 before breakfast    Vital Signs Last 24 Hrs  T(F): 97 (01-15-20 @ 07:00), Max: 99.3 (01-15-20 @ 03:00)  HR: 81 (01-15-20 @ 08:00)  BP: 117/58 (01-15-20 @ 08:00)  RR: 26 (01-15-20 @ 08:00)  SpO2: 100% (01-15-20 @ 08:00) (83% - 100%)    PHYSICAL EXAM:  Constitutional: cachectic, less dyspneic, non-toxic  HEAD/EYES:  anicteric, no conjunctival injection  ENT:  supple, bipap  Cardiovascular:   normal S1, S2, no murmur, (+) edema  Respiratory:  rhonchi b/l, decreased BS L, pleurex with little output  GI:  distended, tympanic, ileostomy with liquid stool, non-tender; incision healing - no cellulitis  :  no rosas  Musculoskeletal:  no synovitis  Neurologic: awake and alert, no focal findings  Skin:  no rash, no erythema, no phlebitis - R PICC c/d/i  Psychiatric:  awake, alert, appropriate mood                        7.4    13.60 )-----------( 271      ( 15 Isma 2020 01:44 )             23.6 01-15    141  |  95  |  18  ----------------------------<  173  3.8   |  33  |  0.50  Ca    7.9      15 Isma 2020 01:44Phos  3.8     01-15Mg     1.7     01-15  TPro  5.9  /  Alb  2.4  /  TBili  0.7  /  DBili  0.3  /  AST  14  /  ALT  13  /  AlkPhos  61  01-15           WBC Count: 13.60 (01-15-20 @ 01:44)  WBC Count: 14.19 (20 @ 12:43)  WBC Count: 12.74 (20 @ 04:17)  WBC Count: 13.62 (20 @ 03:31)  WBC Count: 12.81 (20 @ 12:27)  WBC Count: 21.96 (20 @ 02:53)  WBC Count: 15.75 (20 @ 17:53)  WBC Count: 13.87 (20 @ 11:23)  WBC Count: 10.25 (20 @ 09:37)  WBC Count: 12.69 (20 @ 10:37)  WBC Count: 10.76 (01-10-20 @ 09:53)  WBC Count: 10.79 (20 @ 09:36)    Urinalysis Basic - ( 2020 12:41 )  Color: brown / Appearance: Turbid / S.016 / pH: x  Gluc: x / Ketone: Negative  / Bili: Negative / Urobili: Negative   Blood: x / Protein: 30 mg/dL / Nitrite: Negative   Leuk Esterase: Negative / RBC: >50 /hpf / WBC 6 /HPF   Sq Epi: x / Non Sq Epi: 0 /hpf / Bacteria: Few    Cytopathology - Non Gyn Report (19 @ 18:26)    Specimen(s) Submitted  PLEURAL FLUID  Final Diagnosis  PLEURAL FLUID  NEGATIVE FOR MALIGNANT CELLS.    Surgical Pathology Report (12.10.19 @ 21:40)    Surgical Final Report  Final Diagnosis  Small bowel with ileocolic resection:  - Small bowel with diffuse ischemic enteritis, transmural active inflammation, abscess formation and focal necrosis  - Ischemic enteritis extending to proximal small bowel resection margin  - Distal colonic resection margin, viable    MICROBIOLOGY:  fungal blood culture, HIV test, galactomannen - all pending    Fungitell (20 @ 11:42)    Fungitell: 241:     Culture - Fungal, Blood (collected 2020 18:15)  Source: .Blood Blood  Preliminary Report (2020 08:34):    Testing in progress    Culture - Blood (collected 2020 06:13)  Source: .Blood Blood-Peripheral  Preliminary Report (2020 07:02):    No growth to date.    Culture - Acid Fast (19 @ 03:58)    AFB Specimen Processing: Concentration    Acid-Fast Smear: Negative: Performed At: [address] [id number]  Paresh Roche MD Ph:[phone]    Culture - Blood (19 @ 22:09)    Specimen Source: .Blood Blood-Peripheral    Culture Results:   No growth at 5 days.    Culture - Fungal, Body Fluid (19 @ 19:16)    Specimen Source: .Body Fluid Pleural Fluid    Culture Results:   No growth    Culture - Body Fluid with Gram Stain (19 @ 19:16)    Gram Stain:   No polymorphonuclear cells seen  No organisms seen  by cytocentrifuge    Specimen Source: .Body Fluid Pleural Fluid    Culture Results:   No growth at 5 days    Culture - Blood (19 @ 22:20)    Specimen Source: .Blood Blood    Culture Results:   No growth at 5 days.    Culture - Blood (19 @ 22:20)    Specimen Source: .Blood Blood    Culture Results:   No growth at 5 days.    RADIOLOGY:  imaging below personally reviewed and agree with findings    Xray Chest 1 View- PORTABLE-Urgent (01.14.20 @ 04:11) >  Impression:  The heart is normal in size. Left pleural effusion. Left lower lobe pneumonia and/or atelectasis. Small right pleural effusion cannot be ruled out. A PICC line is seen on the right and the tip is in superior vena cava. No pneumothorax. Calcified aortic knob.    Xray Chest 1 View- PORTABLE-Routine (20 @ 09:47) >  IMPRESSION:  Interval placement of right-sided chest tube.  Bilateral lower lung patchy opacities and small bilateral pleural effusions likely representing pulmonary edema.     Xray Chest 1 View- PORTABLE-Routine (20 @ 07:00) >  IMPRESSION: Right PICC line is unchanged. Left pleural catheters are partially imaged. Small pleural effusions likely with associated areas of atelectasis. No clear evidence of a pneumothorax given left superimposed subcutaneous emphysema.    CT Abdomen and Pelvis w/ IV Cont (19 @ 06:05) >  IMPRESSION:   Small midline anterior abdominal wall fluid collection may represent postoperative seroma, hematoma, or less likely abscess.  Interval resolution of the left pneumothorax.  Bilateral pleural effusions, left greater than right, slightly increased in size compared to the prior study.  Unchanged right lower lobe tree-in-bud/nodular opacities with secretions in the bilateral lower lobe bronchi. Findings may represent pneumonia versus distal mucoid impacted airways.    CT Chest No Cont (19 @ 19:39) >  IMPRESSION:  Left-sided chest tube in place with trace left apical pneumothorax.  Patchy airspace opacities in the right lower lobe, differential favors distal mucoid impacted airways versus pneumonia.  Moderate left and small right pleural effusions with mild subsegmental atelectasis of the right lower lobe.    CT Abdomen and Pelvis No Cont (19 @ 16:48) >  IMPRESSION:   High-grade small bowel obstruction with transition point in the right lower quadrant.

## 2020-01-15 NOTE — PROGRESS NOTE ADULT - ASSESSMENT
74 y/o M presenting with septic shock and high grade SBO s/p exploratory laparotomy, lysis of adhesions, decompression of bowel via enterotomy w/ primary repair, and Abthera VAC placement on 12/6; s/p take down of Abthera, washout and re-application of the Abthera vac on 12/8. 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 s/p L chest tube placement by IR 12/30 for pleural effusion now s/p VATS, with chest tube insertion for drainage of pleural effusion. RRT called 1/13 AM for hypoxia, patient transferred back to SICU.  Patient continued SOB, chest drain possible obstruction expanding     Recommendations  - pain control   - continue dysphagia diet when patient is stable off BIPAP, monitor ostomy output  - F/U CT  - F/U CT surgery  - OOB as tolerated with PT  - care per SICU team  - cont TPN  - F/U Derm consult regarding back rash 1/10: no creams needed, "If no improvement in several days or rash becomes symptomatic, ok to apply triamcinolone 0.1% cream BID to affected area PRN for itch"  - f/u CTS recs  - Appreciate excellent SICU care    RED SURGERY  [id number]

## 2020-01-15 NOTE — PROGRESS NOTE ADULT - ASSESSMENT
75 y/o male presenting with high grade SBO s/p exploratory laparotomy, lysis of adhesions, decompression of bowel via enterotomy w/ primary repair, & Abthera VAC placement (12/06/2019); RTOR for re-exploration w/ Abthera VAC replacement (12/08/2019) to allow for demarcation of ischemic small bowel; RTOR for re-exploration, small bowel resection of 150 cm (150 cm remaining), ileocecetomy, end ileostomy, mucous fistula, and abdominal closure (12/10/2019); hospital course complicated by SVT, short bowel syndrome requiring repletions w/ IV fluids, malnutrition requiring TPN, intermittent episodes of hypotension, spontaneous left pneumothorax s/p pigtail catheter (12/15/2019-12/24/2019), left pleural effusion s/p pigtail catheter w/ IR (12/30/2019), dysphagia, and oral HSV lesions, placement of Pleurx on 1/10, now re-admitted to SICU with acute hypoxic respiratory failure.     Neuro: no acute issues  - Pain control as needed with acetaminophen    Resp: COPD, spontaneous left pneumothorax s/p pigtail catheter, left pleural effusion s/p Pleurx 1/10.  RRT for hypoxic respiratory distress  - CT chest pending at 12AM, premedication benadryl, methylprednisolone ordered.   - Wean CPAP.   - Cont Pleurx cathter to suction  - Continue diuresis as tolerated.   - Pulmicort and Duoneb for COPD  - Will need outpatient follow-up with a pulmonologist for his COPD as patient does not currently have one    CV: Bradycardia, SVT.   - Monitor vital signs    GI: s/p exploratory laparotomy, Grecia, decompression of bowel via enterotomy w/ primary repair, & Abthera VAC placement (12/06/2019); re-exploration w/ Abthera VAC replacement (12/08/2019); re-exploration, small bowel resection of 150 cm (150 cm remaining), ileocecetomy, end ileostomy, mucous fistula, and abdominal closure (12/10/2019); short bowel syndrome, malnutrition, dysphagia  - Dysphagia 1 pureed w/ nectar consistency fluid with TPN, currently undergoing calorie count to determine if TPN can be weanedsaline  - Continue Lomotil, tincture of opium, loperamide, and octreotide for short bowel syndrome  - Protonix to decrease gastric secretions    Renal: no acute issues  - Monitor I&Os  - Monitor electrolytes and replete as necessary    Heme: anemia likely secondary to malnutrition / malabsorption  - Monitor CBC and coags  - Follow up anemia labs  - Lovenox for VTE prophylaxis    ID: oral HSV lesions (s/p acyclovir 12/23/2019-01/02/2020)  - Monitor for clinical evidence of active infection    Endo: no acute issues  - Monitor glucose before meals & at bedtime while on TPN    Disposition:  - Full code  - Will remain in SICU 75 y/o male presenting with high grade SBO s/p exploratory laparotomy, lysis of adhesions, decompression of bowel via enterotomy w/ primary repair, & Abthera VAC placement (12/06/2019); RTOR for re-exploration w/ Abthera VAC replacement (12/08/2019) to allow for demarcation of ischemic small bowel; RTOR for re-exploration, small bowel resection of 150 cm (150 cm remaining), ileocecetomy, end ileostomy, mucous fistula, and abdominal closure (12/10/2019); hospital course complicated by SVT, short bowel syndrome requiring repletions w/ IV fluids, malnutrition requiring TPN, intermittent episodes of hypotension, spontaneous left pneumothorax s/p pigtail catheter (12/15/2019-12/24/2019), left pleural effusion s/p pigtail catheter w/ IR (12/30/2019), dysphagia, and oral HSV lesions, placement of Pleurx on 1/10, now re-admitted to SICU with acute hypoxic respiratory failure.     Neuro: no acute issues  - Pain control as needed with acetaminophen    Resp: COPD, spontaneous left pneumothorax s/p pigtail catheter, left pleural effusion s/p Pleurx 1/10.  RRT for hypoxic respiratory distress  - CT chest pending at 12AM, premedication benadryl, methylprednisolone ordered.   - Wean CPAP.   - Cont Pleurx cathter to suction  - Continue diuresis as tolerated.   - Pulmicort and Duoneb for COPD  - Will need outpatient follow-up with a pulmonologist for his COPD as patient does not currently have one    CV: Bradycardia, SVT.   - Monitor vital signs    GI: s/p exploratory laparotomy, Grecia, decompression of bowel via enterotomy w/ primary repair, & Abthera VAC placement (12/06/2019); re-exploration w/ Abthera VAC replacement (12/08/2019); re-exploration, small bowel resection of 150 cm (150 cm remaining), ileocecetomy, end ileostomy, mucous fistula, and abdominal closure (12/10/2019); short bowel syndrome, malnutrition, dysphagia  - Dysphagia 1 pureed w/ nectar consistency fluid with TPN, currently undergoing calorie count to determine if TPN can be weanedsaline  - Continue Lomotil, tincture of opium, loperamide, and octreotide for short bowel syndrome  - Protonix to decrease gastric secretions    Renal: no acute issues  - Monitor I&Os  - Monitor electrolytes and replete as necessary    Heme: anemia likely secondary to malnutrition / malabsorption  - Monitor CBC and coags  - Follow up anemia labs  - Lovenox for VTE prophylaxis    ID: oral HSV lesions (s/p acyclovir 12/23/2019-01/02/2020)  - Fungitell elevated: HIV testing, LDH pending.     Endo: no acute issues  - Monitor glucose before meals & at bedtime while on TPN    Disposition:  - Full code  - Will remain in SICU

## 2020-01-15 NOTE — PROGRESS NOTE ADULT - SUBJECTIVE AND OBJECTIVE BOX
SURGICAL INTENSIVE CARE UNIT DAILY PROGRESS NOTE    24 HOUR EVENTS:   - Dosed 20mg IV lasix.   - Patient with acute Hemoglobin and hematocrit drop from 7.4/23.7 to 6.2/20.4, received 1u pRBC with response to 8.1/26.2.  - In evening and overnight, had multiple episodes of bradycardia to 30s, while symptomatic and SOB, requiring temporary BiPAP/CPAP.   - Concern for developing hemothorax, Thoracic re-eval appreciated, plan for CT today with IV contrast allergy pre-medication, possible Operating Theatre planning pending CT results.     HPI:  74y Male presented with abdominal pain, nausea, hematemesis, and poor PO intake for ~2-3 days. Labs significant for an CHI w/ Cr 2.74 and lactate of 9.4. He was also notably tachycardic to the 110s and hypotensive w/ SBP in the 70s. He was given 2 units of PRBCs and 2 L of LR in the ED due to concern for upper GI bleeding. Imaging revealed high grade SBO in the RLQ. Patient was taken to the OR emergently for an exploratory laparotomy, lysis of adhesions, decompression of bowel via enterotomy w/ primary repair, and Abthera VAC placement. Of note, the distal 50% of the bowel appeared dusky but viable. He required vasopressor support with phenylephrine and vasopressin infusions. He received 3000 mL of crystalloid w/ EBL of 10 mL and UOP of 25 mL. Patient was left intubated at the end of the case so SICU was consulted for hemodynamic monitoring. Taken back to the OR on 12/8 and underwent take down of Abthera, washout and re-application of the Abthera vac. Went back to OR on 12/10 night for colectomy. Post-op course complicated by short gut syndrome with high ileostomy output as well as left pleural effusion requiring IR guided pigtail catheter placement 12/30. Patient then had VATS on left side for PleurX catheter placement. 01/10.     SICU reconsulted for RRT on the floor for respiratory distress and oxygen desaturation. Patient placed on BiPAP to good effect and left pleurx catheter placed back to suction.       NEURO: AOx3    RESPIRATORY  RR: 19 (01-15-20 @ 02:00) (14 - 33)  SpO2: 100% (01-15-20 @ 02:00) (83% - 100%)  ABG - ( 14 Jan 2020 03:27 )  pH: 7.39  /  pCO2: 59    /  pO2: 128   / HCO3: 35    / Base Excess: 9.4   /  SaO2: 100     Lactate: x        CARDIOVASCULAR  HR: 78 (01-15-20 @ 02:00) (49 - 142)  BP: 94/51 (01-15-20 @ 02:00) (94/51 - 174/72)  BP(mean): 67 (01-15-20 @ 02:00) (67 - 105)    GI/NUTRITION: Softly distended, ileostomy viable with tan green output.     GENITOURINARY  I&O's Detail    01-13 @ 07:01  -  01-14 @ 07:00  --------------------------------------------------------  IN:    fat emulsion (Fish Oil and Plant Based) 20% Infusion: 20.8 mL    fat emulsion (Fish Oil and Plant Based) 20% Infusion: 228.8 mL    Packed Red Blood Cells: 350 mL    Solution: 525 mL    Solution: 540 mL    TPN (Total Parenteral Nutrition): 1300 mL  Total IN: 2964.6 mL    OUT:    Chest Tube: 10 mL    Ileostomy: 1005 mL    Voided: 1025 mL  Total OUT: 2040 mL    Total NET: 924.6 mL      01-14 @ 07:01  -  01-15 @ 04:00  --------------------------------------------------------  IN:    fat emulsion (Fish Oil and Plant Based) 20% Infusion: 112.5 mL    Solution: 250 mL    Solution: 412.5 mL    TPN (Total Parenteral Nutrition): 1000 mL  Total IN: 1775 mL    OUT:    Ileostomy: 825 mL    Voided: 1150 mL  Total OUT: 1975 mL    Total NET: -200 mL          01-15    141  |  95<L>  |  18  ----------------------------<  173<H>  3.8   |  33<H>  |  0.50    Ca    7.9<L>      15 Isma 2020 01:44  Phos  3.8     01-15  Mg     1.7     01-15    TPro  5.9<L>  /  Alb  2.4<L>  /  TBili  0.7  /  DBili  0.3<H>  /  AST  14  /  ALT  13  /  AlkPhos  61  01-15      HEMATOLOGIC                        7.4    13.60 )-----------( 271      ( 15 Isma 2020 01:44 )             23.6         INFECTIOUS DISEASES  RECENT CULTURES:  Specimen Source: .Blood Blood  Date/Time: 01-13 @ 18:15  Culture Results:   Testing in progress  Gram Stain: --  Organism: --  Specimen Source: .Blood Blood-Peripheral  Date/Time: 01-13 @ 16:31  Culture Results:   No growth to date.  Gram Stain: --  Organism: --  Specimen Source: .Blood Blood-Peripheral  Date/Time: 01-13 @ 06:13  Culture Results:   No growth to date.  Gram Stain: --  Organism: --      ENDOCRINE  CAPILLARY BLOOD GLUCOSE      POCT Blood Glucose.: 151 mg/dL (14 Jan 2020 22:55)  POCT Blood Glucose.: 169 mg/dL (14 Jan 2020 17:49)  POCT Blood Glucose.: 134 mg/dL (14 Jan 2020 13:21)  POCT Blood Glucose.: 155 mg/dL (14 Jan 2020 08:07)

## 2020-01-15 NOTE — AIRWAY PLACEMENT NOTE ADULT - AIRWAY COMMENTS:
Pt intubated in OR
Intubation done by MD in SICU at the bedside .  Patient placed on the ventilator as per MD order

## 2020-01-15 NOTE — PROGRESS NOTE ADULT - ATTENDING COMMENTS
Patient seen and examined today  Episodes of bradycadia x 4 to the 30-40s  Given IVF and ACLS   Started on pressors as patient was hypotensive    Electively intubated as he was complaining of dyspnea and difficulty breathing with the bipap.  Anesthesia called and successfully intubated    Chest Ct scan with large left sided pleural efrusion. I believe this is causing his hypoxia and in turn causes episodes of bradycardia.   Dr. Mayes called and taken to the OR for evacuation fo hemothorax.  CC time: 40

## 2020-01-15 NOTE — PROGRESS NOTE ADULT - ASSESSMENT
73 y/o M presenting with septic shock and high grade SBO s/p exploratory laparotomy, lysis of adhesions, decompression of bowel via enterotomy w/ primary repair, and Abthera VAC placement on 12/6; s/p take down of Abthera, washout and re-application of the Abthera vac on 12/8. Now s/p SBR and end ileostomy on 12/10 acute respiratory distress now improving, Pneumothorax, hyperglycemia, delirium. Now still with persistent pneumothorax when chest pigtail clamped.   12/23 VSS on room air recommending IR drainage of left chest  12/24 No evidence of air leak to left chest tube. Tube clamped. Repeat chest cxr in 4 hours. Anticipate chest tube removal if lung remains expanded. Drainage of loculated left effusion discussed with IR. IR to reconsult for drainage once initial tube removed. Discussed plan with Dr Mayes and IR Fellow  12/24  On NC ,    VSSchest xray sm lt pl eff, left chest tube dc'd.  12/26    2l NC   co  sob,  lt side diminshed  12/27 VSS, CXR with unchanged loculated left pleural effusion- IR consulted for pigtail placement, states effusion can be drained via thoracentesis, pigtail placement not indicated at this time - Dr. Mayes to speak with IR attending.   12/30 Patient with persistent PTX  and left pleural effusion now for IR drainage with Dr Elkins.   12/31 HD stable, L PTC w/ high output, serosanguinous drainage, 990 ml overnight/ 2200 in 24 hrs. Tolerating 2L NC supplemental O2, Cyto pending, continue monitor drainage output.   1/1: LPTC still with High output-400/24h. Continue with pigtail cath drain.  1/ 2     lt pigtail draining  on lws  1/3 HD stable, left pigtail catheter continues to drain. Incentive spirometer encouraged, OOB and mobility, continue pulmonary toileting. Maintain chest tube to suction and monitor output.   1/4: Chest tube milked to prevent clogging of tube, Continues to drain with high out put.  1/5 remains with high out put 170/700. Continue drainage  1/6  persistent drainage from lt pl tube  1/7 Left pigtail 150/450. Daily CXR. Planning for pleurex cath placement on Friday 1/10 w/ Dr. Mayes  1/8        Lt pleural tube   persistent draining    1/9 preop for pleurX placement tomorrow. CT clamped. NPO after MN, type and cross x2.   1/10 s/p VATS L pleurx cath placement   1/11 HD stable, no resp distress, maintain L pleurex to suction overnight, CXR in AM.   1/12 pleurex waterseal  1/13   RRT  called for respiratory distress   plx to pleurvac   back to lws   min drainage  no a/l  1/14 VSS, Left pleurx catheter minimal drainage 10ml/24h, CXR: small left pleural effusion with left pleurx catheter in place.     1/15 Overnight episode of bradycardia and respiratory distress that stabilized with BiPap support, concerned for anemia requiring 2 U PRBC and increase in left pleural effusion, minimal drainage in pleurx.

## 2020-01-15 NOTE — PROGRESS NOTE ADULT - PROBLEM SELECTOR PLAN 1
s/p 1/10 Left VATS pleurx catheter placement  Maintain left pleurx to LWS  Strict I & O's- monitor drainage from pleurx  Daily CXR  Please keep pt NPO for possible hemothorax evacuation in OR with Dr. Mayes pending CT chest with IV contrast  Continue management as per primary team s/p 1/10 Left VATS pleurx catheter placement  Maintain left pleurx to LWS  Strict I & O's- monitor drainage from pleurx  Daily CXR  Please keep pt NPO for possible hemothorax evacuation in OR with Dr. Mayes pending CT chest  Continue management as per primary team

## 2020-01-15 NOTE — PROCEDURE NOTE - NSPROCDETAILS_GEN_ALL_CORE
positive blood return obtained via catheter/hemostasis with direct pressure, dressing applied/Seldinger technique/ultrasound guidance/location identified, draped/prepped, sterile technique used, needle inserted/introduced/connected to a pressurized flush line/all materials/supplies accounted for at end of procedure/sutured in place
sutured in place/ultrasound guidance
sterile dressing applied/supine position/secured in place/Seldinger technique/dressing applied/percutaneous

## 2020-01-15 NOTE — PROGRESS NOTE ADULT - SUBJECTIVE AND OBJECTIVE BOX
SUBJECTIVE  Patient seen and examined. Pt has worsening SOB, was put on BIPAP. chest tube has minimal outputs, expanding fluid in thorax, possible hemothorax. acute Hb drop to 6.2 from 7.4 responded to 1 PRBC.  Pt had episodes of tammy to 30s.. Scheduled for CT today, may need to have another tube placed by CT.  t, pain controlled, denies f/c/n/v/d/, +flatus/BM    OVERNIGHT EVENTS:    10-point review of systems completed and negative except as noted above.      OBJECTIVE    MEDICATIONS  acetaminophen   Tablet .. 975 milliGRAM(s) Oral every 6 hours  albuterol/ipratropium for Nebulization. 3 milliLiter(s) Nebulizer every 6 hours  buDESOnide    Inhalation Suspension 0.5 milliGRAM(s) Inhalation every 12 hours  chlorhexidine 2% Cloths 1 Application(s) Topical <User Schedule>  cholestyramine Powder (Sugar-Free) 4 Gram(s) Oral two times a day  diphenhydrAMINE   Injectable 50 milliGRAM(s) IV Push once  diphenoxylate/atropine 2 Tablet(s) Oral <User Schedule>  enoxaparin Injectable 40 milliGRAM(s) SubCutaneous daily  fat emulsion (Fish Oil and Plant Based) 20% Infusion 12.5 mL/Hr IV Continuous <Continuous>  insulin lispro (HumaLOG) corrective regimen sliding scale   SubCutaneous three times a day before meals  insulin lispro (HumaLOG) corrective regimen sliding scale   SubCutaneous at bedtime  loperamide 16 milliGRAM(s) Oral <User Schedule>  methylPREDNISolone sodium succinate Injectable 32 milliGRAM(s) IV Push once  octreotide  Injectable 100 MICROGram(s) SubCutaneous three times a day  ondansetron Injectable 4 milliGRAM(s) IV Push every 6 hours PRN  opium Tincture 6 milliGRAM(s) Oral <User Schedule>  pantoprazole    Tablet 40 milliGRAM(s) Oral before breakfast  Parenteral Nutrition - Adult 1 Each TPN Continuous <Continuous>  piperacillin/tazobactam IVPB.. 3.375 Gram(s) IV Intermittent every 8 hours  sodium chloride 0.9%. 1000 milliLiter(s) IV Continuous <Continuous>      PHYSICAL EXAM  T(C): 36.1 (01-15-20 @ 07:00), Max: 37.4 (01-15-20 @ 03:00)  HR: 81 (01-15-20 @ 08:00) (49 - 142)  BP: 117/58 (01-15-20 @ 08:00) (94/51 - 174/72)  RR: 26 (01-15-20 @ 08:00) (14 - 33)  SpO2: 100% (01-15-20 @ 08:00) (83% - 100%)    20 @ 07:  -  01-15-20 @ 07:00  --------------------------------------------------------  IN: 2237.5 mL / OUT: 2075 mL / NET: 162.5 mL    01-15-20 @ 07:  -  01-15-20 @ 09:39  --------------------------------------------------------  IN: 75 mL / OUT: 0 mL / NET: 75 mL        General: Appears well, NAD  Neuro: AAOx3  CHEST: Clear to auscultation bilaterally  CV: Regular rate and rhythm  Abdomen: soft, nontender, nondistended, no rebound or guarding  Extremities: Grossly symmetric    LABS                        7.4    13.60 )-----------( 271      ( 15 Isma 2020 01:44 )             23.6     -15    141  |  95<L>  |  18  ----------------------------<  173<H>  3.8   |  33<H>  |  0.50    Ca    7.9<L>      15 Isma 2020 01:44  Phos  3.8     01-15  Mg     1.7     01-15    TPro  5.9<L>  /  Alb  2.4<L>  /  TBili  0.7  /  DBili  0.3<H>  /  AST  14  /  ALT  13  /  AlkPhos  61  01-15      Urinalysis Basic - ( 2020 12:41 )    Color: brown / Appearance: Turbid / S.016 / pH: x  Gluc: x / Ketone: Negative  / Bili: Negative / Urobili: Negative   Blood: x / Protein: 30 mg/dL / Nitrite: Negative   Leuk Esterase: Negative / RBC: >50 /hpf / WBC 6 /HPF   Sq Epi: x / Non Sq Epi: 0 /hpf / Bacteria: Few        RADIOLOGY & ADDITIONAL STUDIES

## 2020-01-15 NOTE — PROGRESS NOTE ADULT - SUBJECTIVE AND OBJECTIVE BOX
Cardiovascular Disease Progress Note  Covering for Dr. Rosas    Overnight events: No acute events overnight.    Otherwise review of systems negative    Objective Findings:  T(C): 37.4 (01-15-20 @ 03:00), Max: 37.4 (01-15-20 @ 03:00)  HR: 80 (01-15-20 @ 06:00) (49 - 142)  BP: 104/61 (01-15-20 @ 06:00) (94/51 - 174/72)  RR: 18 (01-15-20 @ 06:00) (14 - 33)  SpO2: 98% (01-15-20 @ 06:00) (83% - 100%)  Wt(kg): --  Daily     Daily Weight in k.9 (15 Isma 2020 01:57)      Physical Exam:  Gen: NAD; Patient resting comfortably  HEENT: EOMI, Normocephalic/ atraumatic  CV: RRR, normal S1 + S2, no m/r/g  Lungs:  Normal respiratory effort; clear to auscultation bilaterally  Abd: soft, non-tender; bowel sounds present  Ext: No edema; warm and well perfused    Telemetry:    Laboratory Data:                        7.4    13.60 )-----------( 271      ( 15 Isma 2020 01:44 )             23.6     -15    141  |  95<L>  |  18  ----------------------------<  173<H>  3.8   |  33<H>  |  0.50    Ca    7.9<L>      15 Isma 2020 01:44  Phos  3.8     01-15  Mg     1.7     01-15    TPro  5.9<L>  /  Alb  2.4<L>  /  TBili  0.7  /  DBili  0.3<H>  /  AST  14  /  ALT  13  /  AlkPhos  61  -15              Inpatient Medications:  MEDICATIONS  (STANDING):  acetaminophen   Tablet .. 975 milliGRAM(s) Oral every 6 hours  albuterol/ipratropium for Nebulization. 3 milliLiter(s) Nebulizer every 6 hours  buDESOnide    Inhalation Suspension 0.5 milliGRAM(s) Inhalation every 12 hours  chlorhexidine 2% Cloths 1 Application(s) Topical <User Schedule>  cholestyramine Powder (Sugar-Free) 4 Gram(s) Oral two times a day  diphenhydrAMINE   Injectable 50 milliGRAM(s) IV Push once  diphenoxylate/atropine 2 Tablet(s) Oral <User Schedule>  enoxaparin Injectable 40 milliGRAM(s) SubCutaneous daily  fat emulsion (Fish Oil and Plant Based) 20% Infusion 12.5 mL/Hr (12.5 mL/Hr) IV Continuous <Continuous>  insulin lispro (HumaLOG) corrective regimen sliding scale   SubCutaneous three times a day before meals  insulin lispro (HumaLOG) corrective regimen sliding scale   SubCutaneous at bedtime  loperamide 16 milliGRAM(s) Oral <User Schedule>  methylPREDNISolone sodium succinate Injectable 32 milliGRAM(s) IV Push once  octreotide  Injectable 100 MICROGram(s) SubCutaneous three times a day  opium Tincture 6 milliGRAM(s) Oral <User Schedule>  pantoprazole    Tablet 40 milliGRAM(s) Oral before breakfast  Parenteral Nutrition - Adult 1 Each (50 mL/Hr) TPN Continuous <Continuous>  piperacillin/tazobactam IVPB.. 3.375 Gram(s) IV Intermittent every 8 hours  sodium chloride 0.9%. 1000 milliLiter(s) (10 mL/Hr) IV Continuous <Continuous>      Assessment: 74M POD2 s/p ex lap, MIRYAM, closure of enterotomy, abthera vac placement     Problem/Plan - 1:  ·  Problem: Small bowel obstruction.  Plan: s/p ex lap, MIRYAM, closure of enterotomy, abthera vac placement  s/p washout  s/p PICC line placement . On TPN.      Problem/Plan - 2:  ·  Problem: Tachy-tammy syndrome    Plan: Sinus bradycardia noted this morning.   Continue off AV thomas blockers. Monitor on telemetry.     Problem/Plan - 3:  ·  Problem: Pneumothorax.  Plan: stable  s/p VATS and Pleurx catheter to suction.      Problem/Plan - 4:  ·  Problem: Rash.  Plan: Resolved.       Rest of care as per SICU.     Over 25 minutes spent on total encounter; more than 50% of the visit was spent counseling and/or coordinating care by the attending physician.      Juan Carlos Mascorro MD [facility]  Cardiovascular Disease  [phone] Cardiovascular Disease Progress Note  Covering for Dr. Rosas    Overnight events: No acute events overnight.  Patient breathing comfortably on BiPaP.   Otherwise review of systems negative    Objective Findings:  T(C): 37.4 (01-15-20 @ 03:00), Max: 37.4 (01-15-20 @ 03:00)  HR: 80 (01-15-20 @ 06:00) (49 - 142)  BP: 104/61 (01-15-20 @ 06:00) (94/51 - 174/72)  RR: 18 (01-15-20 @ 06:00) (14 - 33)  SpO2: 98% (01-15-20 @ 06:00) (83% - 100%)  Wt(kg): --  Daily     Daily Weight in k.9 (15 Isma 2020 01:57)      Physical Exam:  Gen: NAD; Patient resting comfortably  HEENT: EOMI, Normocephalic/ atraumatic  CV: RRR, normal S1 + S2, no m/r/g  Lungs:  Normal respiratory effort; clear to auscultation bilaterally  Abd: soft, non-tender; bowel sounds present  Ext: No edema; warm and well perfused    Telemetry: Sinus bradycardia    Laboratory Data:                        7.4    13.60 )-----------( 271      ( 15 Isma 2020 01:44 )             23.6     01-15    141  |  95<L>  |  18  ----------------------------<  173<H>  3.8   |  33<H>  |  0.50    Ca    7.9<L>      15 Isma 2020 01:44  Phos  3.8     01-15  Mg     1.7     01-15    TPro  5.9<L>  /  Alb  2.4<L>  /  TBili  0.7  /  DBili  0.3<H>  /  AST  14  /  ALT  13  /  AlkPhos  61  01-15              Inpatient Medications:  MEDICATIONS  (STANDING):  acetaminophen   Tablet .. 975 milliGRAM(s) Oral every 6 hours  albuterol/ipratropium for Nebulization. 3 milliLiter(s) Nebulizer every 6 hours  buDESOnide    Inhalation Suspension 0.5 milliGRAM(s) Inhalation every 12 hours  chlorhexidine 2% Cloths 1 Application(s) Topical <User Schedule>  cholestyramine Powder (Sugar-Free) 4 Gram(s) Oral two times a day  diphenhydrAMINE   Injectable 50 milliGRAM(s) IV Push once  diphenoxylate/atropine 2 Tablet(s) Oral <User Schedule>  enoxaparin Injectable 40 milliGRAM(s) SubCutaneous daily  fat emulsion (Fish Oil and Plant Based) 20% Infusion 12.5 mL/Hr (12.5 mL/Hr) IV Continuous <Continuous>  insulin lispro (HumaLOG) corrective regimen sliding scale   SubCutaneous three times a day before meals  insulin lispro (HumaLOG) corrective regimen sliding scale   SubCutaneous at bedtime  loperamide 16 milliGRAM(s) Oral <User Schedule>  methylPREDNISolone sodium succinate Injectable 32 milliGRAM(s) IV Push once  octreotide  Injectable 100 MICROGram(s) SubCutaneous three times a day  opium Tincture 6 milliGRAM(s) Oral <User Schedule>  pantoprazole    Tablet 40 milliGRAM(s) Oral before breakfast  Parenteral Nutrition - Adult 1 Each (50 mL/Hr) TPN Continuous <Continuous>  piperacillin/tazobactam IVPB.. 3.375 Gram(s) IV Intermittent every 8 hours  sodium chloride 0.9%. 1000 milliLiter(s) (10 mL/Hr) IV Continuous <Continuous>      Assessment: 74M POD2 s/p ex lap, MIRYAM, closure of enterotomy, abthera vac placement     Problem/Plan - 1:  ·  Problem: Small bowel obstruction.  Plan: s/p ex lap, MIRYAM, closure of enterotomy, abthera vac placement  s/p washout  s/p PICC line placement . On TPN.      Problem/Plan - 2:  ·  Problem: Tachy-tammy syndrome    Plan: Sinus bradycardia noted this morning.   Continue off AV thomas blockers. Monitor on telemetry.     Problem/Plan - 3:  ·  Problem: Pneumothorax.  Plan: stable  s/p VATS and Pleurx catheter to suction.      Problem/Plan - 4:  ·  Problem: Rash.  Plan: Resolved.       Rest of care as per SICU.     Over 25 minutes spent on total encounter; more than 50% of the visit was spent counseling and/or coordinating care by the attending physician.      Juan Carlos Mascorro MD [facility]  Cardiovascular Disease  [phone]

## 2020-01-15 NOTE — BRIEF OPERATIVE NOTE - BRIEF OP NOTE DRAINS
19Fr JOVANNA intraabdominal, travels across lower quadrants and ends in right upper quadrant.
28Fr CT at apex  24Fr CT at base

## 2020-01-16 LAB
ALBUMIN SERPL ELPH-MCNC: 2.4 G/DL — LOW (ref 3.3–5)
ALP SERPL-CCNC: 62 U/L — SIGNIFICANT CHANGE UP (ref 40–120)
ALT FLD-CCNC: 13 U/L — SIGNIFICANT CHANGE UP (ref 10–45)
ANION GAP SERPL CALC-SCNC: 11 MMOL/L — SIGNIFICANT CHANGE UP (ref 5–17)
APTT BLD: 42.9 SEC — HIGH (ref 27.5–36.3)
AST SERPL-CCNC: 12 U/L — SIGNIFICANT CHANGE UP (ref 10–40)
BILIRUB DIRECT SERPL-MCNC: 0.5 MG/DL — HIGH (ref 0–0.2)
BILIRUB INDIRECT FLD-MCNC: 0.5 MG/DL — SIGNIFICANT CHANGE UP (ref 0.2–1)
BILIRUB SERPL-MCNC: 1 MG/DL — SIGNIFICANT CHANGE UP (ref 0.2–1.2)
BUN SERPL-MCNC: 23 MG/DL — SIGNIFICANT CHANGE UP (ref 7–23)
CALCIUM SERPL-MCNC: 7.6 MG/DL — LOW (ref 8.4–10.5)
CHLORIDE SERPL-SCNC: 101 MMOL/L — SIGNIFICANT CHANGE UP (ref 96–108)
CO2 SERPL-SCNC: 30 MMOL/L — SIGNIFICANT CHANGE UP (ref 22–31)
CREAT SERPL-MCNC: 0.48 MG/DL — LOW (ref 0.5–1.3)
GALACTOMANNAN AG SERPL-ACNC: <0.5 INDEX — SIGNIFICANT CHANGE UP
GAS PNL BLDA: SIGNIFICANT CHANGE UP
GLUCOSE BLDC GLUCOMTR-MCNC: 113 MG/DL — HIGH (ref 70–99)
GLUCOSE BLDC GLUCOMTR-MCNC: 115 MG/DL — HIGH (ref 70–99)
GLUCOSE BLDC GLUCOMTR-MCNC: 116 MG/DL — HIGH (ref 70–99)
GLUCOSE SERPL-MCNC: 113 MG/DL — HIGH (ref 70–99)
GRAM STN FLD: SIGNIFICANT CHANGE UP
HCT VFR BLD CALC: 23.5 % — LOW (ref 39–50)
HCT VFR BLD CALC: 25.9 % — LOW (ref 39–50)
HGB BLD-MCNC: 7.5 G/DL — LOW (ref 13–17)
HGB BLD-MCNC: 8.6 G/DL — LOW (ref 13–17)
INR BLD: 1.19 RATIO — HIGH (ref 0.88–1.16)
MAGNESIUM SERPL-MCNC: 2 MG/DL — SIGNIFICANT CHANGE UP (ref 1.6–2.6)
MCHC RBC-ENTMCNC: 30.9 PG — SIGNIFICANT CHANGE UP (ref 27–34)
MCHC RBC-ENTMCNC: 31.4 PG — SIGNIFICANT CHANGE UP (ref 27–34)
MCHC RBC-ENTMCNC: 31.9 GM/DL — LOW (ref 32–36)
MCHC RBC-ENTMCNC: 33.2 GM/DL — SIGNIFICANT CHANGE UP (ref 32–36)
MCV RBC AUTO: 94.5 FL — SIGNIFICANT CHANGE UP (ref 80–100)
MCV RBC AUTO: 96.7 FL — SIGNIFICANT CHANGE UP (ref 80–100)
NRBC # BLD: 0 /100 WBCS — SIGNIFICANT CHANGE UP (ref 0–0)
NRBC # BLD: 0 /100 WBCS — SIGNIFICANT CHANGE UP (ref 0–0)
PHOSPHATE SERPL-MCNC: 2.3 MG/DL — LOW (ref 2.5–4.5)
PLATELET # BLD AUTO: 238 K/UL — SIGNIFICANT CHANGE UP (ref 150–400)
PLATELET # BLD AUTO: 309 K/UL — SIGNIFICANT CHANGE UP (ref 150–400)
POTASSIUM SERPL-MCNC: 4 MMOL/L — SIGNIFICANT CHANGE UP (ref 3.5–5.3)
POTASSIUM SERPL-SCNC: 4 MMOL/L — SIGNIFICANT CHANGE UP (ref 3.5–5.3)
PROT SERPL-MCNC: 5.5 G/DL — LOW (ref 6–8.3)
PROTHROM AB SERPL-ACNC: 13.6 SEC — HIGH (ref 10–12.9)
RBC # BLD: 2.43 M/UL — LOW (ref 4.2–5.8)
RBC # BLD: 2.74 M/UL — LOW (ref 4.2–5.8)
RBC # FLD: 14.4 % — SIGNIFICANT CHANGE UP (ref 10.3–14.5)
RBC # FLD: 14.6 % — HIGH (ref 10.3–14.5)
SODIUM SERPL-SCNC: 142 MMOL/L — SIGNIFICANT CHANGE UP (ref 135–145)
SPECIMEN SOURCE: SIGNIFICANT CHANGE UP
WBC # BLD: 15.05 K/UL — HIGH (ref 3.8–10.5)
WBC # BLD: 25.73 K/UL — HIGH (ref 3.8–10.5)
WBC # FLD AUTO: 15.05 K/UL — HIGH (ref 3.8–10.5)
WBC # FLD AUTO: 25.73 K/UL — HIGH (ref 3.8–10.5)

## 2020-01-16 PROCEDURE — 99232 SBSQ HOSP IP/OBS MODERATE 35: CPT

## 2020-01-16 PROCEDURE — 93306 TTE W/DOPPLER COMPLETE: CPT | Mod: 26

## 2020-01-16 PROCEDURE — 99291 CRITICAL CARE FIRST HOUR: CPT

## 2020-01-16 PROCEDURE — 71045 X-RAY EXAM CHEST 1 VIEW: CPT | Mod: 26

## 2020-01-16 PROCEDURE — 93971 EXTREMITY STUDY: CPT | Mod: 26

## 2020-01-16 RX ORDER — CALCIUM GLUCONATE 100 MG/ML
2 VIAL (ML) INTRAVENOUS ONCE
Refills: 0 | Status: COMPLETED | OUTPATIENT
Start: 2020-01-16 | End: 2020-01-16

## 2020-01-16 RX ORDER — VANCOMYCIN HCL 1 G
750 VIAL (EA) INTRAVENOUS EVERY 12 HOURS
Refills: 0 | Status: DISCONTINUED | OUTPATIENT
Start: 2020-01-16 | End: 2020-01-18

## 2020-01-16 RX ORDER — SODIUM CHLORIDE 9 MG/ML
500 INJECTION, SOLUTION INTRAVENOUS ONCE
Refills: 0 | Status: COMPLETED | OUTPATIENT
Start: 2020-01-16 | End: 2020-01-16

## 2020-01-16 RX ADMIN — Medication 3 MILLILITER(S): at 00:04

## 2020-01-16 RX ADMIN — Medication 16 MILLIGRAM(S): at 18:18

## 2020-01-16 RX ADMIN — SODIUM CHLORIDE 3000 MILLILITER(S): 9 INJECTION, SOLUTION INTRAVENOUS at 02:00

## 2020-01-16 RX ADMIN — PIPERACILLIN AND TAZOBACTAM 25 GRAM(S): 4; .5 INJECTION, POWDER, LYOPHILIZED, FOR SOLUTION INTRAVENOUS at 15:33

## 2020-01-16 RX ADMIN — PROPOFOL 6.5 MICROGRAM(S)/KG/MIN: 10 INJECTION, EMULSION INTRAVENOUS at 07:50

## 2020-01-16 RX ADMIN — Medication 975 MILLIGRAM(S): at 23:02

## 2020-01-16 RX ADMIN — Medication 975 MILLIGRAM(S): at 18:17

## 2020-01-16 RX ADMIN — Medication 200 GRAM(S): at 18:18

## 2020-01-16 RX ADMIN — MORPHINE 6 MILLIGRAM(S): 10 SOLUTION ORAL at 18:21

## 2020-01-16 RX ADMIN — Medication 3 MILLILITER(S): at 11:12

## 2020-01-16 RX ADMIN — Medication 3 MILLILITER(S): at 05:15

## 2020-01-16 RX ADMIN — FENTANYL CITRATE 1.35 MICROGRAM(S)/KG/HR: 50 INJECTION INTRAVENOUS at 07:37

## 2020-01-16 RX ADMIN — PIPERACILLIN AND TAZOBACTAM 25 GRAM(S): 4; .5 INJECTION, POWDER, LYOPHILIZED, FOR SOLUTION INTRAVENOUS at 05:04

## 2020-01-16 RX ADMIN — OCTREOTIDE ACETATE 100 MICROGRAM(S): 200 INJECTION, SOLUTION INTRAVENOUS; SUBCUTANEOUS at 22:50

## 2020-01-16 RX ADMIN — Medication 0.5 MILLIGRAM(S): at 17:08

## 2020-01-16 RX ADMIN — PROPOFOL 6.5 MICROGRAM(S)/KG/MIN: 10 INJECTION, EMULSION INTRAVENOUS at 07:37

## 2020-01-16 RX ADMIN — OCTREOTIDE ACETATE 100 MICROGRAM(S): 200 INJECTION, SOLUTION INTRAVENOUS; SUBCUTANEOUS at 14:26

## 2020-01-16 RX ADMIN — Medication 16 MILLIGRAM(S): at 22:58

## 2020-01-16 RX ADMIN — ENOXAPARIN SODIUM 40 MILLIGRAM(S): 100 INJECTION SUBCUTANEOUS at 13:27

## 2020-01-16 RX ADMIN — Medication 0.5 MILLIGRAM(S): at 05:15

## 2020-01-16 RX ADMIN — Medication 2 TABLET(S): at 18:18

## 2020-01-16 RX ADMIN — Medication 200 GRAM(S): at 02:49

## 2020-01-16 RX ADMIN — Medication 250 MILLIGRAM(S): at 14:26

## 2020-01-16 RX ADMIN — PIPERACILLIN AND TAZOBACTAM 25 GRAM(S): 4; .5 INJECTION, POWDER, LYOPHILIZED, FOR SOLUTION INTRAVENOUS at 22:50

## 2020-01-16 RX ADMIN — MORPHINE 6 MILLIGRAM(S): 10 SOLUTION ORAL at 23:02

## 2020-01-16 RX ADMIN — Medication 62.5 MILLIMOLE(S): at 07:50

## 2020-01-16 RX ADMIN — SODIUM CHLORIDE 75 MILLILITER(S): 9 INJECTION INTRAMUSCULAR; INTRAVENOUS; SUBCUTANEOUS at 07:38

## 2020-01-16 RX ADMIN — CHLORHEXIDINE GLUCONATE 1 APPLICATION(S): 213 SOLUTION TOPICAL at 05:04

## 2020-01-16 RX ADMIN — Medication 2 TABLET(S): at 22:57

## 2020-01-16 RX ADMIN — Medication 50.62 MICROGRAM(S)/KG/MIN: at 07:37

## 2020-01-16 RX ADMIN — CHLORHEXIDINE GLUCONATE 15 MILLILITER(S): 213 SOLUTION TOPICAL at 05:05

## 2020-01-16 RX ADMIN — Medication 3 MILLILITER(S): at 17:08

## 2020-01-16 RX ADMIN — OCTREOTIDE ACETATE 100 MICROGRAM(S): 200 INJECTION, SOLUTION INTRAVENOUS; SUBCUTANEOUS at 05:05

## 2020-01-16 NOTE — PROGRESS NOTE ADULT - ASSESSMENT
74 y/o M presenting with septic shock and high grade SBO s/p exploratory laparotomy, lysis of adhesions, decompression of bowel via enterotomy w/ primary repair, and Abthera VAC placement on 12/6; s/p take down of Abthera, washout and re-application of the Abthera vac on 12/8. 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 s/p L chest tube placement by IR 12/30 for pleural effusion now s/p VATS, with chest tube insertion for drainage of pleural effusion. RRT called 1/13 AM for hypoxia, patient transferred back to SICU.  Patient continued SOB, chest drain possible obstruction expanding. Patent taken back to OR emergently 1/15 for clot evac and VATS.     Recommendations  - pain control   - wean intubation  - care per SICU team  - wean pressors  - f/u CTS recs  - Appreciate excellent SICU care    [facility]  [id number]

## 2020-01-16 NOTE — PROGRESS NOTE ADULT - ASSESSMENT
75 y/o M presenting with septic shock and high grade SBO s/p exploratory laparotomy, lysis of adhesions, decompression of bowel via enterotomy w/ primary repair, and Abthera VAC placement on 12/6; s/p take down of Abthera, washout and re-application of the Abthera vac on 12/8. Now s/p SBR and end ileostomy on 12/10 acute respiratory distress now improving, Pneumothorax, hyperglycemia, delirium. Now still with persistent pneumothorax when chest pigtail clamped.   12/23 VSS on room air recommending IR drainage of left chest  12/24 No evidence of air leak to left chest tube. Tube clamped. Repeat chest cxr in 4 hours. Anticipate chest tube removal if lung remains expanded. Drainage of loculated left effusion discussed with IR. IR to reconsult for drainage once initial tube removed. Discussed plan with Dr Mayes and IR Fellow  12/24  On NC ,    VSSchest xray sm lt pl eff, left chest tube dc'd.  12/26    2l NC   co  sob,  lt side diminshed  12/27 VSS, CXR with unchanged loculated left pleural effusion- IR consulted for pigtail placement, states effusion can be drained via thoracentesis, pigtail placement not indicated at this time - Dr. Mayes to speak with IR attending.   12/30 Patient with persistent PTX  and left pleural effusion now for IR drainage with Dr Elkins.   12/31 HD stable, L PTC w/ high output, serosanguinous drainage, 990 ml overnight/ 2200 in 24 hrs. Tolerating 2L NC supplemental O2, Cyto pending, continue monitor drainage output.   1/1: LPTC still with High output-400/24h. Continue with pigtail cath drain.  1/ 2     lt pigtail draining  on lws  1/3 HD stable, left pigtail catheter continues to drain. Incentive spirometer encouraged, OOB and mobility, continue pulmonary toileting. Maintain chest tube to suction and monitor output.   1/4: Chest tube milked to prevent clogging of tube, Continues to drain with high out put.  1/5 remains with high out put 170/700. Continue drainage  1/6  persistent drainage from lt pl tube  1/7 Left pigtail 150/450. Daily CXR. Planning for pleurex cath placement on Friday 1/10 w/ Dr. Mayes  1/8        Lt pleural tube   persistent draining    1/9 preop for pleurX placement tomorrow. CT clamped. NPO after MN, type and cross x2.   1/10 s/p VATS L pleurx cath placement   1/11 HD stable, no resp distress, maintain L pleurex to suction overnight, CXR in AM.   1/12 pleurex waterseal  1/13   RRT  called for respiratory distress   plx to pleurvac   back to lws   min drainage  no a/l  1/14 VSS, Left pleurx catheter minimal drainage 10ml/24h, CXR: small left pleural effusion with left pleurx catheter in place.     1/15 Overnight episode of bradycardia and respiratory distress that stabilized with BiPap support, concerned for anemia requiring 2 U PRBC and increase in left pleural effusion, minimal drainage in pleurx.  1/15 s/p bronch, Left VATS. Evacuation of 2L blood from L chest, pleurX removal, chest ubes x2  1/16 HD stable, minimal vasopressors, CXR stable, currently SBT, CT x2 remains to suction, draining.

## 2020-01-16 NOTE — PROGRESS NOTE ADULT - PROBLEM SELECTOR PLAN 1
s/p 1/10 Left VATS pleurx catheter placement  s/p 1/15 L VATS evacuation L hemothorax - intubated  Maintain left CT to wall suction   Strict I & O's- monitor drainage from chest tubes hourly  Daily CXR  Continue management as per primary team

## 2020-01-16 NOTE — PROGRESS NOTE ADULT - SUBJECTIVE AND OBJECTIVE BOX
Patient is a 75y old  Male who presents with a chief complaint of abd. pain (2020 13:14)    f/u leukocytosis and +fungitell    Interval History/ROS:  events noted; RTOR to evacuate 2L bloody fluid. pleurex removed and CT x2 placed.  Intubated.   Remainder of ROS difficult due to sedation, intubation.  patient seen earlier this morning    PAST MEDICAL & SURGICAL HISTORY:  COPD with hypoxia  ETOH abuse  History of lumbosacral spine surgery  History of prostate surgery  History of appendectomy    Allergies  IV Contrast (Hives)    ANTIMICROBIALS:  acyclovir   Oral Tab/Cap (-)  cefTRIAXone   IVPB (12/6 x1)  piperacillin/tazobactam IVPB (-12/15)  ceFAZolin   IVPB (-)  vancomycin  IVPB (1/13 x1)    active  piperacillin/tazobactam IVPB.. 3.375 every 8 hours (-)  vancomycin  IVPB 750 every 12 hours (-)    MEDICATIONS  (STANDING):  acetaminophen   Tablet .. 975 every 6 hours  albuterol/ipratropium for Nebulization. 3 every 6 hours  buDESOnide    Inhalation Suspension 0.5 every 12 hours  diphenoxylate/atropine 2 <User Schedule>  enoxaparin Injectable 40 daily  insulin lispro (HumaLOG) corrective regimen sliding scale  every 6 hours  loperamide 16 <User Schedule>  norepinephrine Infusion 0.5 <Continuous>  octreotide  Injectable 100 three times a day  opium Tincture 6 <User Schedule>  pantoprazole    Tablet 40 before breakfast    Vital Signs Last 24 Hrs  T(F): 99 (20 @ 11:00), Max: 99.5 (01-15-20 @ 23:00)  HR: 77 (20 @ 12:00)  BP: 115/55 (01-15-20 @ 20:00)  RR: 16 (20 @ 12:00)  SpO2: 100% (20 @ 12:00) (70% - 100%)    PHYSICAL EXAM:  Constitutional: cachectic, intubated  HEAD/EYES:  anicteric, no conjunctival injection  ENT:  supple, orally intubated  Cardiovascular:   normal S1, S2, no murmur, (+) edema  Respiratory:  coarse b/l breath sounds; left CT x2  GI:  distended, tympanic, ileostomy with liquid stool, non-tender; incision healing - no cellulitis  :  rosas placed, dark, clear urine  Musculoskeletal:  no synovitis  Neurologic: awake and alert, no focal findings  Skin:  no rash, no erythema, no phlebitis - R PICC c/d/i  Psychiatric:  awake, alert, appropriate mood                          7.5    15.05 )-----------( 238      ( 2020 12:47 )             23.5     142  |  101  |  23  ----------------------------<  113  4.0   |  30  |  0.48  Ca    7.6      2020 00:14Phos  2.3     Mg     2.0       TPro  5.5  /  Alb  2.4  /  TBili  1.0  /  DBili  0.5  /  AST  12  /  ALT  13  /  AlkPhos  62      WBC Count: 15.05 (20 @ 12:47)  WBC Count: 25.73 (20 @ 00:14)  WBC Count: 15.44 (01-15-20 @ 19:48)  WBC Count: 13.68 (01-15-20 @ 14:34)  WBC Count: 13.60 (01-15-20 @ 01:44)  WBC Count: 14.19 (20 @ 12:43)  WBC Count: 12.74 (20 @ 04:17)  WBC Count: 13.62 (20 @ 03:31)  WBC Count: 12.81 (20 @ 12:27)  WBC Count: 21.96 (20 @ 02:53)  WBC Count: 15.75 (20 @ 17:53)  WBC Count: 13.87 (20 @ 11:23)  WBC Count: 10.25 (20 @ 09:37)  WBC Count: 12.69 (20 @ 10:37)  WBC Count: 10.76 (01-10-20 @ 09:53)           Urinalysis Basic - ( 2020 12:41 )  Color: brown / Appearance: Turbid / S.016 / pH: x  Gluc: x / Ketone: Negative  / Bili: Negative / Urobili: Negative   Blood: x / Protein: 30 mg/dL / Nitrite: Negative   Leuk Esterase: Negative / RBC: >50 /hpf / WBC 6 /HPF   Sq Epi: x / Non Sq Epi: 0 /hpf / Bacteria: Few    Cytopathology - Non Gyn Report (19 @ 18:26)    Specimen(s) Submitted  PLEURAL FLUID  Final Diagnosis  PLEURAL FLUID  NEGATIVE FOR MALIGNANT CELLS.    Surgical Pathology Report (12.10.19 @ 21:40)    Surgical Final Report  Final Diagnosis  Small bowel with ileocolic resection:  - Small bowel with diffuse ischemic enteritis, transmural active inflammation, abscess formation and focal necrosis  - Ischemic enteritis extending to proximal small bowel resection margin  - Distal colonic resection margin, viable    MICROBIOLOGY:  Culture - Fungal, Blood (01.15.20 @ 10:09)    Specimen Source: .Blood Blood-Venous    Culture Results:   Testing in progress    Culture - Fungal, Blood (01.15.20 @ 10:09)    Specimen Source: .Blood Blood-Peripheral    Culture Results:   Testing in progress    HIV-1/2 Combo Result: Nonreact: (01.15.20 @ 09:03)    Galactomannen - pending    Fungitell (20 @ 11:42)    Fungitell: 241:     Culture - Fungal, Blood (collected 2020 18:15)  Source: .Blood Blood  Preliminary Report (2020 08:34):    Testing in progress    Culture - Blood (collected 2020 06:13)  Source: .Blood Blood-Peripheral  Preliminary Report (2020 07:02):    No growth to date.    Culture - Acid Fast (19 @ 03:58)    AFB Specimen Processing: Concentration    Acid-Fast Smear: Negative: Performed At:  [facility] [id number]  Paresh Roche MD Ph:[phone]    Culture - Blood (19 @ 22:09)    Specimen Source: .Blood Blood-Peripheral    Culture Results:   No growth at 5 days.    Culture - Fungal, Body Fluid (19 @ 19:16)    Specimen Source: .Body Fluid Pleural Fluid    Culture Results:   No growth    Culture - Body Fluid with Gram Stain (19 @ 19:16)    Gram Stain:   No polymorphonuclear cells seen  No organisms seen  by cytocentrifuge    Specimen Source: .Body Fluid Pleural Fluid    Culture Results:   No growth at 5 days    Culture - Blood (19 @ 22:20)    Specimen Source: .Blood Blood    Culture Results:   No growth at 5 days.    Culture - Blood (19 @ 22:20)    Specimen Source: .Blood Blood    Culture Results:   No growth at 5 days.    RADIOLOGY:  CT Abdomen and Pelvis w/ IV Cont (01.15.20 @ 13:25) >  There is complete atelectasis of the left lower lobe and partial atelectasis of the left upper lobe. Partial compressive atelectasis of the right lower lobe. Emphysema. Nodular opacities in the right lower lobe, unchanged.  PLEURA: There is a left chest tubes. There is a large loculated left pleural effusion with areas of high attenuation, increased in size. Trace left pneumothorax. There is a small to moderate right pleural effusion. IMPRESSION:  Loculated large left pleural effusion with areas of hyperattenuation likely secondary to hematoma. Interval increase in size of the loculated left pleural effusion since 2019. Left sided chest tube terminating in the pleural space. Trace left pneumothorax.  Extensive left subcutaneous emphysema. Hematoma is also noted along the left lateral chest wall.  Interval resolution of the midline anterior abdominal wall fluid collection.  Markedly distended urinary bladder with mild bilateral hydronephrosis.  A small droplet of gas in the right anterior abdomen adjacent   imaging above personally reviewed and agree with findings    Xray Chest 1 View- PORTABLE-Urgent (20 @ 04:11) >  Impression:  The heart is normal in size. Left pleural effusion. Left lower lobe pneumonia and/or atelectasis. Small right pleural effusion cannot be ruled out. A PICC line is seen on the right and the tip is in superior vena cava. No pneumothorax. Calcified aortic knob.    Xray Chest 1 View- PORTABLE-Routine (20 @ 09:47) >  IMPRESSION:  Interval placement of right-sided chest tube.  Bilateral lower lung patchy opacities and small bilateral pleural effusions likely representing pulmonary edema.     Xray Chest 1 View- PORTABLE-Routine (20 @ 07:00) >  IMPRESSION: Right PICC line is unchanged. Left pleural catheters are partially imaged. Small pleural effusions likely with associated areas of atelectasis. No clear evidence of a pneumothorax given left superimposed subcutaneous emphysema.    CT Abdomen and Pelvis w/ IV Cont (19 @ 06:05) >  IMPRESSION:   Small midline anterior abdominal wall fluid collection may represent postoperative seroma, hematoma, or less likely abscess.  Interval resolution of the left pneumothorax.  Bilateral pleural effusions, left greater than right, slightly increased in size compared to the prior study.  Unchanged right lower lobe tree-in-bud/nodular opacities with secretions in the bilateral lower lobe bronchi. Findings may represent pneumonia versus distal mucoid impacted airways.    CT Chest No Cont (19 @ 19:39) >  IMPRESSION:  Left-sided chest tube in place with trace left apical pneumothorax.  Patchy airspace opacities in the right lower lobe, differential favors distal mucoid impacted airways versus pneumonia.  Moderate left and small right pleural effusions with mild subsegmental atelectasis of the right lower lobe.    CT Abdomen and Pelvis No Cont (19 @ 16:48) >  IMPRESSION:   High-grade small bowel obstruction with transition point in the right lower quadrant.

## 2020-01-16 NOTE — PROGRESS NOTE ADULT - SUBJECTIVE AND OBJECTIVE BOX
HISTORY  75y Male presented with abdominal pain, nausea, hematemesis, and poor PO intake for ~2-3 days. Labs significant for an CHI w/ Cr 2.74 and lactate of 9.4. He was also notably tachycardic to the 110s and hypotensive w/ SBP in the 70s. He was given 2 units of PRBCs and 2 L of LR in the ED due to concern for upper GI bleeding. Imaging revealed high grade SBO in the RLQ. Patient was taken to the OR emergently for an exploratory laparotomy, lysis of adhesions, decompression of bowel via enterotomy w/ primary repair, and Abthera VAC placement. Of note, the distal 50% of the bowel appeared dusky but viable. He required vasopressor support with phenylephrine and vasopressin infusions. He received 3000 mL of crystalloid w/ EBL of 10 mL and UOP of 25 mL. Patient was left intubated at the end of the case so SICU was consulted for hemodynamic monitoring. Taken back to the OR on 12/8 and underwent take down of Abthera, washout and re-application of the Abthera vac. Went back to OR on 12/10 night for colectomy. Post-op course complicated by short gut syndrome with high ileostomy output as well as left pleural effusion requiring IR guided pigtail catheter placement 12/30. Patient then had VATS on left side for PleurX catheter placement. 01/10.       24 HOUR EVENTS:  - Intubated for increasing respiratory distress  - Taken to OR emergently for VATs. 2 L of blood evacuated. 2 units of PRBC received in OR, 800 IVF, 1200 urine output. 2 chest tube left in place.  - Remains on levophed for BP support  - 500ml fluid challenge administered overnight for oliguria.     SUBJECTIVE/ROS:  [ ] A ten-point review of systems was otherwise negative except as noted.  [ ] Due to altered mental status/intubation, subjective information were not able to be obtained from the patient. History was obtained, to the extent possible, from review of the chart and collateral sources of information.      NEURO  RASS; -1   Meds: acetaminophen   Tablet .. 975 milliGRAM(s) Oral every 6 hours  fentaNYL   Infusion 0.5 MICROgram(s)/kG/Hr IV Continuous <Continuous>  ondansetron Injectable 4 milliGRAM(s) IV Push every 6 hours PRN Nausea and/or Vomiting  opium Tincture 6 milliGRAM(s) Oral <User Schedule>  propofol Infusion 20 MICROgram(s)/kG/Min IV Continuous <Continuous>    [x] Adequacy of sedation and pain control has been assessed and adjusted      RESPIRATORY  RR: 22 (01-16-20 @ 02:00) (8 - 41)  SpO2: 100% (01-16-20 @ 02:00) (70% - 100%)  Exam: unlabored, clear to auscultation bilaterally  Mechanical Ventilation: Mode: AC/ CMV (Assist Control/ Continuous Mandatory Ventilation), RR (machine): 22, RR (patient): 22, TV (machine): 450, FiO2: 40, PEEP: 8, ITime: 0.9, MAP: 14, PIP: 27  ABG - ( 15 Isma 2020 23:58 )  pH: 7.47  /  pCO2: 48    /  pO2: 148   / HCO3: 34    / Base Excess: 9.4   /  SaO2: 100     Lactate: x      [N/A] Extubation Readiness Assessed  Meds: albuterol/ipratropium for Nebulization. 3 milliLiter(s) Nebulizer every 6 hours  buDESOnide    Inhalation Suspension 0.5 milliGRAM(s) Inhalation every 12 hours        CARDIOVASCULAR  HR: 77 (01-16-20 @ 02:00) (0 - 115)  BP: 115/55 (01-15-20 @ 20:00) (30/12 - 186/92)  BP(mean): 78 (01-15-20 @ 20:00) (17 - 130)  ABP: 117/46 (01-16-20 @ 02:00) (-13/-13 - 164/50)  ABP(mean): 72 (01-16-20 @ 02:00) (-13 - 109)  Exam: regular rate and rhythm  Cardiac Rhythm: sinus  Perfusion     [x]Adequate   [ ]Inadequate  Mentation   [x]Normal       [ ]Reduced  Extremities  [x]Warm         [ ]Cool  Volume Status [ ]Hypervolemic [x]Euvolemic [ ]Hypovolemic  Meds: norepinephrine Infusion 0.5 MICROgram(s)/kG/Min IV Continuous <Continuous>        GI/NUTRITION  Exam: soft, nontender, nondistended  Diet: NPO   Meds: diphenoxylate/atropine 2 Tablet(s) Oral <User Schedule>  loperamide 16 milliGRAM(s) Oral <User Schedule>  pantoprazole    Tablet 40 milliGRAM(s) Oral before breakfast      GENITOURINARY  I&O's Detail    01-14 @ 07:01  -  01-15 @ 07:00  --------------------------------------------------------  IN:    fat emulsion (Fish Oil and Plant Based) 20% Infusion: 150 mL    Solution: 275 mL    Solution: 150 mL    Solution: 462.5 mL    TPN (Total Parenteral Nutrition): 1200 mL  Total IN: 2237.5 mL    OUT:    Ileostomy: 925 mL    Voided: 1150 mL  Total OUT: 2075 mL    Total NET: 162.5 mL      01-15 @ 07:01 - 01-16 @ 02:17  --------------------------------------------------------  IN:    fentaNYL  Infusion: 9.8 mL    norepinephrine Infusion: 92.2 mL    propofol Infusion: 95.2 mL    sodium chloride 0.9%.: 825 mL    sodium chloride 0.9%.: 50 mL    Solution: 10 mL    Solution: 175 mL    TPN (Total Parenteral Nutrition): 350 mL  Total IN: 1607.2 mL    OUT:    Chest Tube: 95 mL    Chest Tube: 25 mL    Ileostomy: 120 mL    Indwelling Catheter - Urethral: 215 mL    Voided: 500 mL  Total OUT: 955 mL    Total NET: 652.2 mL        Weight (kg): 54 (01-15 @ 15:12)  01-16    142  |  101  |  23  ----------------------------<  113<H>  4.0   |  30  |  0.48<L>    Ca    7.6<L>      16 Jan 2020 00:14  Phos  2.3     01-16  Mg     2.0     01-16    TPro  5.5<L>  /  Alb  2.4<L>  /  TBili  1.0  /  DBili  0.5<H>  /  AST  12  /  ALT  13  /  AlkPhos  62  01-16    [x] Martinez catheter, indication: urine output monitoring   Meds: calcium gluconate IVPB 2 Gram(s) IV Intermittent once  sodium chloride 0.9%. 1000 milliLiter(s) IV Continuous <Continuous>  sodium chloride 0.9%. 1000 milliLiter(s) IV Continuous <Continuous>        HEMATOLOGIC  Meds: enoxaparin Injectable 40 milliGRAM(s) SubCutaneous daily    [x] VTE Prophylaxis                        8.6    25.73 )-----------( 309      ( 16 Jan 2020 00:14 )             25.9     PT/INR - ( 16 Jan 2020 00:14 )   PT: 13.6 sec;   INR: 1.19 ratio         PTT - ( 16 Jan 2020 00:14 )  PTT:42.9 sec  Transfusion     [ ] PRBC   [ ] Platelets   [ ] FFP   [ ] Cryoprecipitate      INFECTIOUS DISEASES  WBC Count: 25.73 K/uL (01-16 @ 00:14)  WBC Count: 15.44 K/uL (01-15 @ 19:48)  WBC Count: 13.68 K/uL (01-15 @ 14:34)    RECENT CULTURES:  Specimen Source: .Blood Blood  Date/Time: 01-13 @ 18:15  Culture Results:   Testing in progress  Gram Stain: --  Organism: --  Specimen Source: .Blood Blood-Peripheral  Date/Time: 01-13 @ 16:31  Culture Results:   No growth to date.  Gram Stain: --  Organism: --  Specimen Source: .Blood Blood-Peripheral  Date/Time: 01-13 @ 06:13  Culture Results:   No growth to date.  Gram Stain: --  Organism: --    Meds: piperacillin/tazobactam IVPB.. 3.375 Gram(s) IV Intermittent every 8 hours        ENDOCRINE  CAPILLARY BLOOD GLUCOSE      POCT Blood Glucose.: 108 mg/dL (15 Isma 2020 23:55)  POCT Blood Glucose.: 173 mg/dL (15 Isma 2020 12:19)  POCT Blood Glucose.: 172 mg/dL (15 Isma 2020 10:32)  POCT Blood Glucose.: 189 mg/dL (15 Isma 2020 07:28)    Meds: insulin lispro (HumaLOG) corrective regimen sliding scale   SubCutaneous every 6 hours  octreotide  Injectable 100 MICROGram(s) SubCutaneous three times a day        ACCESS DEVICES:  [x] Peripheral IV  [ ] Central Venous Line	[ ] R	[ ] L	[ ] IJ	[ ] Fem	[ ] SC	Placed:   [ ] Arterial Line		[ ] R	[ ] L	[ ] Fem	[ ] Rad	[ ] Ax	Placed:   [ ] PICC:					[ ] Mediport  [ ] Urinary Catheter, Date Placed:   [x] Necessity of urinary, arterial, and venous catheters discussed    OTHER MEDICATIONS:  chlorhexidine 0.12% Liquid 15 milliLiter(s) Oral Mucosa every 12 hours  chlorhexidine 2% Cloths 1 Application(s) Topical <User Schedule>      CODE STATUS: full code      IMAGING: < from: CT Abdomen and Pelvis w/ IV Cont (01.15.20 @ 13:25) >  IMPRESSION:     Loculated large left pleural effusion with areas of hyperattenuation likely secondary to hematoma. Interval increase in size of the loculated left pleural effusion since 12/30/2019. Left sided chest tube terminating in the pleural space. Trace left pneumothorax.     Extensive left subcutaneous emphysema. Hematoma is also noted along the left lateral chest wall.    Interval resolution of the midline anterior abdominal wall fluid collection.    Markedly distended urinary bladder with mild bilateral hydronephrosis.    A small droplet of gas in the right anterior abdomen adjacent to the ileostomy may be extraluminal. Repeat CT with oral contrast may be helpful for further evaluation.    < end of copied text >

## 2020-01-16 NOTE — PROGRESS NOTE ADULT - SUBJECTIVE AND OBJECTIVE BOX
11    SUBJECTIVE  Overnight pt intubated for resp distress, taken to OR for VATs, 2L blood evacuated and received 2uPRBC intraop. 2 chest tubes left in place. this morning on 0.1 levo, intubated sedated, aware, responds to commands.     OVERNIGHT EVENTS:    10-point review of systems completed and negative except as noted above.      OBJECTIVE    MEDICATIONS  acetaminophen   Tablet .. 975 milliGRAM(s) Oral every 6 hours  albuterol/ipratropium for Nebulization. 3 milliLiter(s) Nebulizer every 6 hours  buDESOnide    Inhalation Suspension 0.5 milliGRAM(s) Inhalation every 12 hours  chlorhexidine 0.12% Liquid 15 milliLiter(s) Oral Mucosa every 12 hours  chlorhexidine 2% Cloths 1 Application(s) Topical <User Schedule>  diphenoxylate/atropine 2 Tablet(s) Oral <User Schedule>  enoxaparin Injectable 40 milliGRAM(s) SubCutaneous daily  insulin lispro (HumaLOG) corrective regimen sliding scale   SubCutaneous every 6 hours  loperamide 16 milliGRAM(s) Oral <User Schedule>  norepinephrine Infusion 0.5 MICROgram(s)/kG/Min IV Continuous <Continuous>  octreotide  Injectable 100 MICROGram(s) SubCutaneous three times a day  ondansetron Injectable 4 milliGRAM(s) IV Push every 6 hours PRN  opium Tincture 6 milliGRAM(s) Oral <User Schedule>  pantoprazole    Tablet 40 milliGRAM(s) Oral before breakfast  piperacillin/tazobactam IVPB.. 3.375 Gram(s) IV Intermittent every 8 hours  sodium chloride 0.9%. 1000 milliLiter(s) IV Continuous <Continuous>  sodium chloride 0.9%. 1000 milliLiter(s) IV Continuous <Continuous>  vancomycin  IVPB 750 milliGRAM(s) IV Intermittent every 12 hours      PHYSICAL EXAM  T(C): 37.2 (01-16-20 @ 11:00), Max: 37.5 (01-15-20 @ 23:00)  HR: 73 (01-16-20 @ 11:51) (0 - 115)  BP: 115/55 (01-15-20 @ 20:00) (30/12 - 186/92)  RR: 19 (01-16-20 @ 11:45) (8 - 29)  SpO2: 100% (01-16-20 @ 11:51) (70% - 100%)    01-15-20 @ 07:01  -  01-16-20 @ 07:00  --------------------------------------------------------  IN: 2373.7 mL / OUT: 1365 mL / NET: 1008.7 mL    01-16-20 @ 07:01 - 01-16-20 @ 11:59  --------------------------------------------------------  IN: 564.8 mL / OUT: 135 mL / NET: 429.8 mL        General: Appears well, NAD  Neuro: AAOx3  CHEST: 2 Chest tubes draining to wall suction. apical tube with air leak.   CV: Regular rate and rhythm  Abdomen: soft, nontender, nondistended, no rebound or guarding  Extremities: Grossly symmetric    LABS                        8.6    25.73 )-----------( 309      ( 16 Jan 2020 00:14 )             25.9     01-16    142  |  101  |  23  ----------------------------<  113<H>  4.0   |  30  |  0.48<L>    Ca    7.6<L>      16 Jan 2020 00:14  Phos  2.3     01-16  Mg     2.0     01-16    TPro  5.5<L>  /  Alb  2.4<L>  /  TBili  1.0  /  DBili  0.5<H>  /  AST  12  /  ALT  13  /  AlkPhos  62  01-16    PT/INR - ( 16 Jan 2020 00:14 )   PT: 13.6 sec;   INR: 1.19 ratio         PTT - ( 16 Jan 2020 00:14 )  PTT:42.9 sec      RADIOLOGY & ADDITIONAL STUDIES

## 2020-01-16 NOTE — PROGRESS NOTE ADULT - SUBJECTIVE AND OBJECTIVE BOX
Patient is a 75y old  Male who presents with a chief complaint of abd. pain (2020 09:46)      SUBJECTIVE: intubated on mech vent, cpap, SBT in progress - on minimal vasopressor     Vital Signs Last 24 Hrs  T(C): 37.4 (20 @ 03:00), Max: 37.5 (01-15-20 @ 23:00)  T(F): 99.3 (20 @ 03:00), Max: 99.5 (01-15-20 @ 23:00)  HR: 70 (20 @ 10:15) (0 - 115)  BP: 115/55 (01-15-20 @ 20:00) (30/12 - 186/92)  RR: 18 (20 @ 10:15) (8 - 38)  SpO2: 100% (20 @ 10:15) (70% - 100%)          Mode: AC/ CMV (Assist Control/ Continuous Mandatory Ventilation), RR (machine): 18, TV (machine): 450, FiO2: 40, PEEP: 5, ITime: 0.9, MAP: 9, PIP: 17      01-15-20 @ 07:01  -  20 @ 07:00  --------------------------------------------------------  IN: 2373.7 mL / OUT: 1365 mL / NET: 1008.7 mL    20 @ 07:01  -  20 @ 10:16  --------------------------------------------------------  IN: 421.2 mL / OUT: 105 mL / NET: 316.2 mL        Daily Height in cm: 175 (15 Isma 2020 15:12)    Daily Weight in k.3 (2020 00:01)                          8.6    25.73 )-----------( 309      ( 2020 00:14 )             25.9     01-16    142  |  101  |  23  ----------------------------<  113<H>  4.0   |  30  |  0.48<L>    Ca    7.6<L>      2020 00:14  Phos  2.3       Mg     2.0         TPro  5.5<L>  /  Alb  2.4<L>  /  TBili  1.0  /  DBili  0.5<H>  /  AST  12  /  ALT  13  /  AlkPhos  62  -          PHYSICAL EXAM  Neurology: A&Ox3, NAD, no gross deficits  CV : RRR+S1S2  Lungs: Respirations non-labored, B/L BS + mech vent on cpap  Abdomen: Soft, NT/ND, +BSx4Q + ileostomy  Extremities: +1  B/L LE edema, negative calf tenderness, +PP     L fem art line  RUE PICC        CHEST TUBES:  Left Apical  CT     [  ]LWS  [ x ]H2O seal + airleak 150 ml drainage overnight  Left Basal CT   [  ]LWS  [ x ]H2O seal no airleak  100 ml drainage overnight         Mode: AC/ CMV (Assist Control/ Continuous Mandatory Ventilation)  RR (machine): 18  TV (machine): 450  FiO2: 40  PEEP: 5  ITime: 0.9  MAP: 9  PIP: 17    Currently on CPAP 10 AM         MEDICATIONS  acetaminophen   Tablet .. 975 milliGRAM(s) Oral every 6 hours  albuterol/ipratropium for Nebulization. 3 milliLiter(s) Nebulizer every 6 hours  buDESOnide    Inhalation Suspension 0.5 milliGRAM(s) Inhalation every 12 hours  chlorhexidine 0.12% Liquid 15 milliLiter(s) Oral Mucosa every 12 hours  chlorhexidine 2% Cloths 1 Application(s) Topical <User Schedule>  diphenoxylate/atropine 2 Tablet(s) Oral <User Schedule>  enoxaparin Injectable 40 milliGRAM(s) SubCutaneous daily  insulin lispro (HumaLOG) corrective regimen sliding scale   SubCutaneous every 6 hours  loperamide 16 milliGRAM(s) Oral <User Schedule>  norepinephrine Infusion 0.5 MICROgram(s)/kG/Min IV Continuous <Continuous>  octreotide  Injectable 100 MICROGram(s) SubCutaneous three times a day  ondansetron Injectable 4 milliGRAM(s) IV Push every 6 hours PRN  opium Tincture 6 milliGRAM(s) Oral <User Schedule>  pantoprazole    Tablet 40 milliGRAM(s) Oral before breakfast  piperacillin/tazobactam IVPB.. 3.375 Gram(s) IV Intermittent every 8 hours  propofol Infusion 20 MICROgram(s)/kG/Min IV Continuous <Continuous>  sodium chloride 0.9%. 1000 milliLiter(s) IV Continuous <Continuous>  sodium chloride 0.9%. 1000 milliLiter(s) IV Continuous <Continuous>

## 2020-01-16 NOTE — PROGRESS NOTE ADULT - ATTENDING COMMENTS
Patient seen and examined today  s/p vats with 2.5 liters of hematoma evacuated pOD #1    Acute respiratory failure s/p trauma- continue mechanical ventilation at this time.  Oxygenation and ventilation appropriate  CXR reviewed- clear today, chest tube in place 250 and 250 cc overnight   Underwent spontaneous breathing trial and patient meets criteria   RSBI 37  Pco2 55 on gas for SBT- will repeat SBT  Continue oxygen supplementation with goal SpO2> 92%    Hypotensive on levophed 0.06   -will perform CC US an wean as tolerated    leukocytosis- will monitor     Acute blood loss anemia - hct 27--> 25  -will monitor CBC     Hyperglycemia - continue ISS  -goal BG < 180    Ileostomy reduced to 260 cc   -will follow     Plan for another SBT and see if meets criteria for extubation.

## 2020-01-16 NOTE — PROGRESS NOTE ADULT - ASSESSMENT
75 y/o male presenting with high grade SBO s/p exploratory laparotomy, lysis of adhesions, decompression of bowel via enterotomy w/ primary repair, & Abthera VAC placement (12/06/2019); RTOR for re-exploration w/ Abthera VAC replacement (12/08/2019) to allow for demarcation of ischemic small bowel; RTOR for re-exploration, small bowel resection of 150 cm (150 cm remaining), ileocecetomy, end ileostomy, mucous fistula, and abdominal closure (12/10/2019); hospital course complicated by SVT, short bowel syndrome requiring repletions w/ IV fluids, malnutrition requiring TPN, intermittent episodes of hypotension, spontaneous left pneumothorax s/p pigtail catheter (12/15/2019-12/24/2019), left pleural effusion s/p pigtail catheter w/ IR (12/30/2019), dysphagia, and oral HSV lesions, placement of Pleurx on 1/10, now re-admitted to SICU with acute hypoxic respiratory failure.     Neuro: no acute issues  - Pain control as needed with acetaminophen    Resp: COPD, spontaneous left pneumothorax s/p pigtail catheter, left pleural effusion s/p Pleurx 1/10.  Intubated for respiratory distress s/p VATS 01/15 with 2 Liter of blood evacuation   - Cont chest tubes to suction   - Pulmicort and Duoneb for COPD  - Will need outpatient follow-up with a pulmonologist for his COPD as patient does not currently have one    CV: Bradycardia, SVT.   - Monitor vital signs    GI: s/p exploratory laparotomy, [place]ia, decompression of bowel via enterotomy w/ primary repair, & Abthera VAC placement (12/06/2019); re-exploration w/ Abthera VAC replacement (12/08/2019); re-exploration, small bowel resection of 150 cm (150 cm remaining), ileocecetomy, end ileostomy, mucous fistula, and abdominal closure (12/10/2019); short bowel syndrome, malnutrition, dysphagia  - NPO while intubated   - Continue Lomotil, tincture of opium, loperamide, and octreotide for short bowel syndrome  - Protonix to decrease gastric secretions  - Continue 0.5/1 ostomy repletions    Renal: no acute issues  - Monitor I&Os  - Monitor electrolytes and replete as necessary    Heme: anemia likely secondary to malnutrition / malabsorption  - Monitor CBC and coags  - Q12 CBCs   - Follow up anemia labs  - Lovenox for VTE prophylaxis    ID: oral HSV lesions (s/p acyclovir 12/23/2019-01/02/2020)  - Fungitell elevated: HIV testing, LDH pending.     Endo: no acute issues  - Monitor glucose before meals & at bedtime while on TPN    Disposition:  - Full code  - Will remain in SICU

## 2020-01-16 NOTE — PROGRESS NOTE ADULT - ASSESSMENT
A/P: 74 year old male w/ PMH of COPD and EtOH dependence with PSH/o appy, prostate surgery, & spine surgery  septic shock and high grade SBO s/p exploratory laparotomy, lysis of adhesions, decompression of bowel via enterotomy w/ primary repair, and Abthera VAC placement on 12/6; s/p take down of Abthera, washout and re-application of the Abthera vac on 12/8. Now s/p SBR and end ileostomy on 12/10.   TPN consulted to assist w/ management of pt's nutrition in pt w/ prolonged hospital course now tolerating diet but has high Ileostomy output.  Pt s/p Lt Chest Pigtail for effusion in IR with persistent high output on 1/10/2020 pt had Video assisted thorascopic placement of Left PleurX catheter    Pt remains in SICU - after Lt chest Evacuation of 2L blood w/ placement of CT x2  Pt w/ improving severe Protein-Calorie Malnutrition- Fecal fat testin suggestive of decreased absorption of fat        - Pt currently NPO and intubated         Plan to extubate pt if tolerated SBT with subsequent resumption of diet         - Pt's diet had been advanced to Soft Mechanical w/ Protein Supplements      - PICC no longer available for TPN      - Will monitor off TPN-             VItal 1.2 PO BID & Promote PO BID shakes to pt's diet w/ suspected fat malabsorption.           MCT 15mL in setting of poor absorption of LCT.       - Consider testing levels of fat soluable vitamins         Pt's PT/ INR is within acceptable levels suggesting Vit K absorption  Strict Intake and Output - Improving ostomy output- decreased today as pt NPO            ICU repleting w/ 0.5:1 IVF q6h            Continue to monitor while on octreotide, lomotil, tincture of opium, & imodium  BLE Edema- will continue to monitor off TPN           Consider Lasix for diuresis   While on TPN pt was treated for HypoNa, hyperK, & HypoPhos which improved- continue to monitor             replete as per SICU   Leukocytosis- improving on Zosyn, holding Vanco with close monitoring for renal impairment   Hyperglycemia-improving      Fingersticks & ISS coverage - as per SICU  RUE PICC - maintenance as per protocol  RUE edema- continue elevation, consider Duplex  To continue monitoring of nutrition - Weights three times a week; Daily CMP, Mg, Ionized Ca, Phosphorus, and Weekly Triglycerides and Pre-albumin  Continue as per Surgery, will follow with you, D/w primary team    Andreina Hubbard PA-C  TPN team, pager [phone]  D/w Dr Chacko A/P: 74 year old male w/ PMH of COPD and EtOH dependence with PSH/o appy, prostate surgery, & spine surgery  septic shock and high grade SBO s/p exploratory laparotomy, lysis of adhesions, decompression of bowel via enterotomy w/ primary repair, and Abthera VAC placement on 12/6; s/p take down of Abthera, washout and re-application of the Abthera vac on 12/8. Now s/p SBR and end ileostomy on 12/10.   TPN consulted to assist w/ management of pt's nutrition in pt w/ prolonged hospital course now tolerating diet but has high Ileostomy output.  Pt s/p Lt Chest Pigtail for effusion in IR with persistent high output on 1/10/2020 pt had Video assisted thorascopic placement of Left PleurX catheter    Pt remains in SICU - after Lt chest Evacuation of 2L blood w/ placement of CT x2  Pt w/ improving severe Protein-Calorie Malnutrition- Fecal fat testing suggestive of decreased absorption of fat      - Pt currently NPO          Plan to extubate pt if tolerated SBT with subsequent resumption of diet         - Pt's diet had been advanced to Soft Mechanical w/ Protein Supplements      - PICC no longer available for TPN      - Will monitor off TPN-             VItal 1.2 PO BID & Promote PO BID shakes to pt's diet w/ suspected fat malabsorption.           MCT 15mL in setting of poor absorption of LCT.       - Consider testing levels of fat soluable vitamins         Pt's PT/ INR is within acceptable levels suggesting Vit K absorption  Strict Intake and Output - Improving ostomy output- decreased today as pt NPO            ICU repleting w/ 0.5:1 IVF q6h            Continue to monitor while on octreotide, lomotil, tincture of opium, & imodium  BLE Edema- will continue to monitor off TPN           Consider Lasix for diuresis   While on TPN pt was treated for HypoNa, hyperK, & HypoPhos which improved- continue to monitor             replete as per SICU   Leukocytosis- improving on Zosyn, holding Vanco with close monitoring for renal impairment   Hyperglycemia-improving      Fingersticks & ISS coverage - as per SICU  RUE PICC - maintenance as per protocol  RUE edema- continue elevation, consider Duplex  To continue monitoring of nutrition - Weights three times a week; Daily CMP, Mg, Ionized Ca, Phosphorus, and Weekly Triglycerides and Pre-albumin  Continue as per SICU/Surgery, will follow with you, D/w primary team    Andreina Hubbard PA-C  TPN team, pager [phone]  D/w Dr Chacko

## 2020-01-16 NOTE — PROGRESS NOTE ADULT - SUBJECTIVE AND OBJECTIVE BOX
[facility] NUTRITION SUPPORT / TPN -- FOLLOW UP NOTE  --------------------------------------------------------------------------------    24 hour events/subjective:    Pt was taken to OR yesterday for Left VATS. Evacuation of 2L blood from L chest w/ placement of CT x2  Pt remains in SICU-          overnight pt was bradycardic w/ respiratory distress and was kept intubated and pt improved         currently sedation has been stopped in preparation if SBT and possible extubation  Pt continues w/decreased Ileostomy output- pt has been NPO            replacement with 0.5 cc per cc output with NS            continue lomotil, imodium, octreotide, tincture of opium  TPN was held   Leukocytosis, Zosyn- recent cultures remain neg  No  n/v, ostomy w/ reduced output  Pt denies any pain/ cough/ palp/ sob/ dyspnea  No f/c/s    Diet:  Diet, NPO (01-15-20 @ 18:47)      Appetite: [x  ]Poor [  ]Adequate [  ]Good  Caloric intake:  [  ]  Adequate   [ x  ] Inadequate    ROS: General/ GI see HPI  all other systems negative      ALLERGIES & MEDICATIONS  --------------------------------------------------------------------------------  ALLERGIES  IV Contrast (Hives)    STANDING INPATIENT MEDICATIONS    acetaminophen   Tablet .. 975 milliGRAM(s) Oral every 6 hours  albuterol/ipratropium for Nebulization. 3 milliLiter(s) Nebulizer every 6 hours  buDESOnide    Inhalation Suspension 0.5 milliGRAM(s) Inhalation every 12 hours  chlorhexidine 0.12% Liquid 15 milliLiter(s) Oral Mucosa every 12 hours  chlorhexidine 2% Cloths 1 Application(s) Topical <User Schedule>  diphenoxylate/atropine 2 Tablet(s) Oral <User Schedule>  enoxaparin Injectable 40 milliGRAM(s) SubCutaneous daily  insulin lispro (HumaLOG) corrective regimen sliding scale   SubCutaneous every 6 hours  loperamide 16 milliGRAM(s) Oral <User Schedule>  norepinephrine Infusion 0.5 MICROgram(s)/kG/Min IV Continuous <Continuous>  octreotide  Injectable 100 MICROGram(s) SubCutaneous three times a day  opium Tincture 6 milliGRAM(s) Oral <User Schedule>  pantoprazole    Tablet 40 milliGRAM(s) Oral before breakfast  piperacillin/tazobactam IVPB.. 3.375 Gram(s) IV Intermittent every 8 hours  propofol Infusion 20 MICROgram(s)/kG/Min IV Continuous <Continuous>  sodium chloride 0.9%. 1000 milliLiter(s) IV Continuous <Continuous>  sodium chloride 0.9%. 1000 milliLiter(s) IV Continuous <Continuous>      PRN INPATIENT MEDICATION  ondansetron Injectable 4 milliGRAM(s) IV Push every 6 hours PRN        VITALS/PHYSICAL EXAM  --------------------------------------------------------------------------------  T(C): 37.4 (01-16-20 @ 03:00), Max: 37.5 (01-15-20 @ 23:00)  HR: 80 (01-16-20 @ 09:30) (0 - 115)  BP: 115/55 (01-15-20 @ 20:00) (30/12 - 186/92)  RR: 18 (01-16-20 @ 09:30) (8 - 38)  SpO2: 100% (01-16-20 @ 09:30) (70% - 100%)  Wt(kg): --  Height (cm): 175 (01-15-20 @ 15:12)  Weight (kg): 54 (01-15-20 @ 15:12)  BMI (kg/m2): 17.6 (01-15-20 @ 15:12)  BSA (m2): 1.66 (01-15-20 @ 15:12)      01-15-20 @ 07:01  -  01-16-20 @ 07:00  --------------------------------------------------------  IN: 2373.7 mL / OUT: 1365 mL / NET: 1008.7 mL    01-16-20 @ 07:01  -  01-16-20 @ 09:46  --------------------------------------------------------  IN: 215.1 mL / OUT: 75 mL / NET: 140.1 mL      PHYSICAL EXAM  --------------------------------------------------------------------------------  	Gen: guarded but stable, Alert, Intubated, norepi gtt  	HEENT: NC/AT, PERRL, supple neck, trachea midline              Chest:  CTA, Lt chest w/ CT x2 to wall suction (apex w/ serosanguinous drainage, base w/more sanguinous than serous drainage  	GI: (+) BS, softly distended, non tender                   midline incision c/d/i w/o drainage                   (+)ostomy pink viable- bilious drainage              MSK: FROM x4, no contractures nor deformities  	Vascular: Equally Warm, (+)BLE edema,  no clubbing, cyanosis,                        RUE swelling w/o cord, mass, erythema, drainage, nor tenderness, no LUE swelling -BUE w/o clubbing cyanosis                        Rt PICC dressing c/d/I w/o swelling or s/sx infection   	Neuro: No focal deficits, intact sensation, weakened strength  	Psych: Normal affect and mood      LABS/ CULTURES/ RADIOLOGY:              8.6    25.73 >-----------<  309      [01-16-20 @ 00:14]              25.9     142  |  101  |  23  ----------------------------<  113      [01-16-20 @ 00:14]  4.0   |  30  |  0.48        Ca     7.6     [01-16-20 @ 00:14]      Mg     2.0     [01-16-20 @ 00:14]      Phos  2.3     [01-16-20 @ 00:14]    TPro  5.5  /  Alb  2.4  /  TBili  1.0  /  DBili  0.5  /  AST  12  /  ALT  13  /  AlkPhos  62  [01-16-20 @ 00:14]    PT/INR: PT 13.6 , INR 1.19       [01-16-20 @ 00:14]  PTT: 42.9       [01-16-20 @ 00:14]    CK 49      [01-15-20 @ 10:07]        [01-15-20 @ 01:44]    Blood Gas Arterial - Calcium, Ionized: 1.13 mmoL/L (01-16-20 @ 09:12)  Blood Gas Arterial - Calcium, Ionized: 1.19 mmoL/L (01-16-20 @ 04:07)    CAPILLARY BLOOD GLUCOSE  POCT Blood Glucose.: 115 mg/dL (16 Jan 2020 06:23)  POCT Blood Glucose.: 108 mg/dL (15 Isma 2020 23:55)  POCT Blood Glucose.: 173 mg/dL (15 Isma 2020 12:19)  POCT Blood Glucose.: 172 mg/dL (15 Isma 2020 10:32)    Prealbumin, Serum: 15 mg/dL (01-15-20 @ 09:01)  Prealbumin, Serum: 13 mg/dL (01-14-20 @ 07:29)  Prealbumin, Serum: 13 mg/dL (01-07-20 @ 02:35)  Prealbumin, Serum: 14 mg/dL (01-06-20 @ 07:40)  Prealbumin, Serum: 16 mg/dL (01-01-20 @ 02:48)    Triglycerides, Serum: 51 mg/dL (01.15.20 @ 01:44)  Triglycerides, Serum: 60 mg/dL (01.14.20 @ 03:31)  Triglycerides, Serum: 50 mg/dL (01.07.20 @ 01:05)  Triglycerides, Serum: 48 mg/dL (01.06.20 @ 02:44)  Triglycerides, Serum: 56 mg/dL (01.01.20 @ 00:42)    Culture - Fungal, Blood (01.15.20 @ 10:09)    Specimen Source: .Blood Blood-Venous    Culture Results:   Testing in progress [facility] NUTRITION SUPPORT / TPN -- FOLLOW UP NOTE  --------------------------------------------------------------------------------    24 hour events/subjective:    Pt was taken to OR yesterday for Left VATS. Evacuation of 2L blood from Lt chest w/ placement of CT x2  Pt remains in SICU-          overnight pt was bradycardic w/ respiratory distress and was kept intubated and pt improved         currently sedation has been stopped in preparation if SBT and possible extubation  Pt continues w/decreased Ileostomy output- pt has been NPO            replacement with 0.5 cc per cc output with NS            continue lomotil, imodium, octreotide, tincture of opium  TPN was held as PICC was needed last evening during recitative efforts   Leukocytosis, Zosyn- recent cultures remain neg  No  n/v, ostomy w/ reduced output  Pt denies any pain/ cough/ palp/ sob/ dyspnea  No f/c/s    Diet:  Diet, NPO (01-15-20 @ 18:47)      Appetite: [x  ]Poor [  ]Adequate [  ]Good  Caloric intake:  [  ]  Adequate   [ x  ] Inadequate    ROS: General/ GI see HPI  all other systems negative      ALLERGIES & MEDICATIONS  --------------------------------------------------------------------------------  ALLERGIES  IV Contrast (Hives)    STANDING INPATIENT MEDICATIONS    acetaminophen   Tablet .. 975 milliGRAM(s) Oral every 6 hours  albuterol/ipratropium for Nebulization. 3 milliLiter(s) Nebulizer every 6 hours  buDESOnide    Inhalation Suspension 0.5 milliGRAM(s) Inhalation every 12 hours  chlorhexidine 0.12% Liquid 15 milliLiter(s) Oral Mucosa every 12 hours  chlorhexidine 2% Cloths 1 Application(s) Topical <User Schedule>  diphenoxylate/atropine 2 Tablet(s) Oral <User Schedule>  enoxaparin Injectable 40 milliGRAM(s) SubCutaneous daily  insulin lispro (HumaLOG) corrective regimen sliding scale   SubCutaneous every 6 hours  loperamide 16 milliGRAM(s) Oral <User Schedule>  norepinephrine Infusion 0.5 MICROgram(s)/kG/Min IV Continuous <Continuous>  octreotide  Injectable 100 MICROGram(s) SubCutaneous three times a day  opium Tincture 6 milliGRAM(s) Oral <User Schedule>  pantoprazole    Tablet 40 milliGRAM(s) Oral before breakfast  piperacillin/tazobactam IVPB.. 3.375 Gram(s) IV Intermittent every 8 hours  propofol Infusion 20 MICROgram(s)/kG/Min IV Continuous <Continuous>  sodium chloride 0.9%. 1000 milliLiter(s) IV Continuous <Continuous>  sodium chloride 0.9%. 1000 milliLiter(s) IV Continuous <Continuous>      PRN INPATIENT MEDICATION  ondansetron Injectable 4 milliGRAM(s) IV Push every 6 hours PRN        VITALS/PHYSICAL EXAM  --------------------------------------------------------------------------------  T(C): 37.4 (01-16-20 @ 03:00), Max: 37.5 (01-15-20 @ 23:00)  HR: 80 (01-16-20 @ 09:30) (0 - 115)  BP: 115/55 (01-15-20 @ 20:00) (30/12 - 186/92)  RR: 18 (01-16-20 @ 09:30) (8 - 38)  SpO2: 100% (01-16-20 @ 09:30) (70% - 100%)  Wt(kg): --  Height (cm): 175 (01-15-20 @ 15:12)  Weight (kg): 54 (01-15-20 @ 15:12)  BMI (kg/m2): 17.6 (01-15-20 @ 15:12)  BSA (m2): 1.66 (01-15-20 @ 15:12)      01-15-20 @ 07:01  -  01-16-20 @ 07:00  --------------------------------------------------------  IN: 2373.7 mL / OUT: 1365 mL / NET: 1008.7 mL    01-16-20 @ 07:01  -  01-16-20 @ 09:46  --------------------------------------------------------  IN: 215.1 mL / OUT: 75 mL / NET: 140.1 mL      PHYSICAL EXAM  --------------------------------------------------------------------------------  	Gen: guarded but stable, Alert, Intubated, norepi gtt  	HEENT: NC/AT, PERRL, supple neck, trachea midline              Chest:  CTA, Lt chest w/ CT x2 to wall suction (apex w/ serosanguinous drainage,                       base w/more sanguinous than serous drainage  	GI: (+) BS, softly distended, non tender                   midline incision c/d/i w/o drainage                   (+)ostomy pink viable- bilious drainage              MSK: FROM x4, no contractures nor deformities  	Vascular: Equally Warm, (+)BLE edema,  no clubbing, cyanosis,                        RUE swelling w/o cord, mass, erythema, drainage, nor tenderness, no LUE swelling -                             BUE w/o clubbing cyanosis                        Rt PICC dressing c/d/I w/o swelling or s/sx infection   	Neuro: No focal deficits, intact sensation, weakened strength  	Psych: Normal affect and mood      LABS/ CULTURES/ RADIOLOGY:              8.6    25.73 >-----------<  309      [01-16-20 @ 00:14]              25.9     142  |  101  |  23  ----------------------------<  113      [01-16-20 @ 00:14]  4.0   |  30  |  0.48        Ca     7.6     [01-16-20 @ 00:14]      Mg     2.0     [01-16-20 @ 00:14]      Phos  2.3     [01-16-20 @ 00:14]    TPro  5.5  /  Alb  2.4  /  TBili  1.0  /  DBili  0.5  /  AST  12  /  ALT  13  /  AlkPhos  62  [01-16-20 @ 00:14]    PT/INR: PT 13.6 , INR 1.19       [01-16-20 @ 00:14]  PTT: 42.9       [01-16-20 @ 00:14]    CK 49      [01-15-20 @ 10:07]        [01-15-20 @ 01:44]    Blood Gas Arterial - Calcium, Ionized: 1.13 mmoL/L (01-16-20 @ 09:12)  Blood Gas Arterial - Calcium, Ionized: 1.19 mmoL/L (01-16-20 @ 04:07)    CAPILLARY BLOOD GLUCOSE  POCT Blood Glucose.: 115 mg/dL (16 Jan 2020 06:23)  POCT Blood Glucose.: 108 mg/dL (15 Isma 2020 23:55)  POCT Blood Glucose.: 173 mg/dL (15 Isma 2020 12:19)  POCT Blood Glucose.: 172 mg/dL (15 Isma 2020 10:32)    Prealbumin, Serum: 15 mg/dL (01-15-20 @ 09:01)  Prealbumin, Serum: 13 mg/dL (01-14-20 @ 07:29)  Prealbumin, Serum: 13 mg/dL (01-07-20 @ 02:35)  Prealbumin, Serum: 14 mg/dL (01-06-20 @ 07:40)  Prealbumin, Serum: 16 mg/dL (01-01-20 @ 02:48)    Triglycerides, Serum: 51 mg/dL (01.15.20 @ 01:44)  Triglycerides, Serum: 60 mg/dL (01.14.20 @ 03:31)  Triglycerides, Serum: 50 mg/dL (01.07.20 @ 01:05)  Triglycerides, Serum: 48 mg/dL (01.06.20 @ 02:44)  Triglycerides, Serum: 56 mg/dL (01.01.20 @ 00:42)    Culture - Fungal, Blood (01.15.20 @ 10:09)    Specimen Source: .Blood Blood-Venous    Culture Results:   Testing in progress [facility] NUTRITION SUPPORT / TPN -- FOLLOW UP NOTE  --------------------------------------------------------------------------------    24 hour events/subjective:    Pt was taken to OR yesterday for Left VATS. Evacuation of 2L blood from Lt chest w/ placement of CT x2  Pt remains in SICU-          overnight pt was bradycardic w/ respiratory distress and was kept intubated and pt improved         currently sedation has been stopped in preparation if SBT and possible extubation  Pt continues w/decreased Ileostomy output- pt has been NPO            replacement with 0.5 cc per cc output with NS            continue lomotil, imodium, octreotide, tincture of opium  TPN was held as PICC was needed last evening during resuscitative efforts & not restarted  Leukocytosis, Zosyn- recent cultures remain neg  No  n/v, ostomy w/ reduced output  Pt denies any pain/ cough/ palp/ sob/ dyspnea  No f/c/s    Diet:  Diet, NPO (01-15-20 @ 18:47)      Appetite: [x  ]Poor [  ]Adequate [  ]Good  Caloric intake:  [  ]  Adequate   [ x  ] Inadequate    ROS: General/ GI see HPI  all other systems negative      ALLERGIES & MEDICATIONS  --------------------------------------------------------------------------------  ALLERGIES  IV Contrast (Hives)    STANDING INPATIENT MEDICATIONS    acetaminophen   Tablet .. 975 milliGRAM(s) Oral every 6 hours  albuterol/ipratropium for Nebulization. 3 milliLiter(s) Nebulizer every 6 hours  buDESOnide    Inhalation Suspension 0.5 milliGRAM(s) Inhalation every 12 hours  chlorhexidine 0.12% Liquid 15 milliLiter(s) Oral Mucosa every 12 hours  chlorhexidine 2% Cloths 1 Application(s) Topical <User Schedule>  diphenoxylate/atropine 2 Tablet(s) Oral <User Schedule>  enoxaparin Injectable 40 milliGRAM(s) SubCutaneous daily  insulin lispro (HumaLOG) corrective regimen sliding scale   SubCutaneous every 6 hours  loperamide 16 milliGRAM(s) Oral <User Schedule>  norepinephrine Infusion 0.5 MICROgram(s)/kG/Min IV Continuous <Continuous>  octreotide  Injectable 100 MICROGram(s) SubCutaneous three times a day  opium Tincture 6 milliGRAM(s) Oral <User Schedule>  pantoprazole    Tablet 40 milliGRAM(s) Oral before breakfast  piperacillin/tazobactam IVPB.. 3.375 Gram(s) IV Intermittent every 8 hours  propofol Infusion 20 MICROgram(s)/kG/Min IV Continuous <Continuous>  sodium chloride 0.9%. 1000 milliLiter(s) IV Continuous <Continuous>  sodium chloride 0.9%. 1000 milliLiter(s) IV Continuous <Continuous>      PRN INPATIENT MEDICATION  ondansetron Injectable 4 milliGRAM(s) IV Push every 6 hours PRN        VITALS/PHYSICAL EXAM  --------------------------------------------------------------------------------  T(C): 37.4 (01-16-20 @ 03:00), Max: 37.5 (01-15-20 @ 23:00)  HR: 80 (01-16-20 @ 09:30) (0 - 115)  BP: 115/55 (01-15-20 @ 20:00) (30/12 - 186/92)  RR: 18 (01-16-20 @ 09:30) (8 - 38)  SpO2: 100% (01-16-20 @ 09:30) (70% - 100%)  Wt(kg): --  Height (cm): 175 (01-15-20 @ 15:12)  Weight (kg): 54 (01-15-20 @ 15:12)  BMI (kg/m2): 17.6 (01-15-20 @ 15:12)  BSA (m2): 1.66 (01-15-20 @ 15:12)      01-15-20 @ 07:01  -  01-16-20 @ 07:00  --------------------------------------------------------  IN: 2373.7 mL / OUT: 1365 mL / NET: 1008.7 mL    01-16-20 @ 07:01  -  01-16-20 @ 09:46  --------------------------------------------------------  IN: 215.1 mL / OUT: 75 mL / NET: 140.1 mL      PHYSICAL EXAM  --------------------------------------------------------------------------------  	Gen: guarded but stable, Alert, Intubated, norepi gtt  	HEENT: NC/AT, PERRL, supple neck, trachea midline              Chest:  CTA, Lt chest w/ CT x2 to wall suction (apex w/ serosanguinous drainage,                       base w/more sanguinous than serous drainage)  	GI: (+) BS, softly distended, non tender                   midline incision c/d/i w/o drainage                   (+)ostomy pink viable- bilious drainage              MSK: FROM x4, no contractures nor deformities  	Vascular: Equally Warm, (+)BLE edema,  no clubbing, cyanosis,                        RUE swelling w/o cord, mass, erythema, drainage, nor tenderness, no LUE swelling -                             BUE w/o clubbing cyanosis                        Rt PICC dressing c/d/I w/o swelling or s/sx infection   	Neuro: No focal deficits, intact sensation, weakened strength  	Psych: Normal affect and mood      LABS/ CULTURES/ RADIOLOGY:              8.6    25.73 >-----------<  309      [01-16-20 @ 00:14]              25.9     142  |  101  |  23  ----------------------------<  113      [01-16-20 @ 00:14]  4.0   |  30  |  0.48        Ca     7.6     [01-16-20 @ 00:14]      Mg     2.0     [01-16-20 @ 00:14]      Phos  2.3     [01-16-20 @ 00:14]    TPro  5.5  /  Alb  2.4  /  TBili  1.0  /  DBili  0.5  /  AST  12  /  ALT  13  /  AlkPhos  62  [01-16-20 @ 00:14]    PT/INR: PT 13.6 , INR 1.19       [01-16-20 @ 00:14]  PTT: 42.9       [01-16-20 @ 00:14]    CK 49      [01-15-20 @ 10:07]        [01-15-20 @ 01:44]    Blood Gas Arterial - Calcium, Ionized: 1.13 mmoL/L (01-16-20 @ 09:12)  Blood Gas Arterial - Calcium, Ionized: 1.19 mmoL/L (01-16-20 @ 04:07)    CAPILLARY BLOOD GLUCOSE  POCT Blood Glucose.: 115 mg/dL (16 Jan 2020 06:23)  POCT Blood Glucose.: 108 mg/dL (15 Isma 2020 23:55)  POCT Blood Glucose.: 173 mg/dL (15 Isma 2020 12:19)  POCT Blood Glucose.: 172 mg/dL (15 Isma 2020 10:32)    Prealbumin, Serum: 15 mg/dL (01-15-20 @ 09:01)  Prealbumin, Serum: 13 mg/dL (01-14-20 @ 07:29)  Prealbumin, Serum: 13 mg/dL (01-07-20 @ 02:35)  Prealbumin, Serum: 14 mg/dL (01-06-20 @ 07:40)  Prealbumin, Serum: 16 mg/dL (01-01-20 @ 02:48)    Triglycerides, Serum: 51 mg/dL (01.15.20 @ 01:44)  Triglycerides, Serum: 60 mg/dL (01.14.20 @ 03:31)  Triglycerides, Serum: 50 mg/dL (01.07.20 @ 01:05)  Triglycerides, Serum: 48 mg/dL (01.06.20 @ 02:44)  Triglycerides, Serum: 56 mg/dL (01.01.20 @ 00:42)    Culture - Fungal, Blood (01.15.20 @ 10:09)    Specimen Source: .Blood Blood-Venous    Culture Results:   Testing in progress

## 2020-01-16 NOTE — AIRWAY REMOVAL NOTE  ADULT & PEDS - ARTIFICAL AIRWAY REMOVAL COMMENTS
Written order for extubation verified. The patient was identified by full name and birth date compared to the identification band. Present during the procedure was GONZALO Olmstead

## 2020-01-16 NOTE — PROGRESS NOTE ADULT - ASSESSMENT
75M active smoker with COPD (not on home O2), active EtOH abuse admitted 12/6 with SBO.  Hospital course complicated by CHI, delirium, short gut syndrome, pneumothorax, episodes of hypotension, hypoxia, rash, anemia and transfusion requirements.      12/6/19 - OR for ex-lap, SANDRA and decompression enterotomy with primary repair - dusky area of bowel noted  12/8/19 - RTOR for washout, areas of ischemia of concern  12/10/19 - RTOR for partial colectomy, mucous fistula, end ileostomy  12/11/19 - extubated  12/15/19 - pneumothorax s/p L CT  12/30/19 - CT by IR  1/10/2020 - VATS with pleurex catheter insertion for drainage of pleural effusion.  1/10/2020 - rash - derm - miliaria rubra due to sweat trapping on the back  1/13/20/20 - RRT - hypoxia, hypotension, leukocytosis, no fever, drop in H/H  1/15/2020 - RTOR for VATS to evacuate hematoma    After RRT, cultures sent, zosyn started, CXR with increased LLL opacity.  Given PRBC.  Elevated fungitell noted.  Significance is not clear.  Though he is on TPN, fungal blood cultures are negative.  Not really at risk for PJP though HIV status is not known.  He is not really at risk for invasive aspergillosis.    Overall, leukocytosis, elevated fungitell, pneumonia    Fungitell  - f/u fungal blood cultures  - f/u galactomannen  - hold on antifungals    Leukocytosis  - in setting of underlying pneumonia and post-op  - zosyn okay for now - would like to limit to 7 days (today is D#4)  - f/u all cultures  - trend cbc    Pneumonia  - f/u sputum cultures

## 2020-01-16 NOTE — PROGRESS NOTE ADULT - SUBJECTIVE AND OBJECTIVE BOX
Cardiovascular Disease Progress Note  Covering for Dr. Rosas.    Overnight events: Mr. Hernandez is intubated and sedated.   Otherwise review of systems negative    Objective Findings:  T(C): 37.4 (20 @ 03:00), Max: 37.5 (01-15-20 @ 23:00)  HR: 73 (20 @ 06:30) (0 - 115)  BP: 115/55 (01-15-20 @ 20:00) (30/12 - 186/92)  RR: 20 (20 @ 06:30) (8 - 41)  SpO2: 100% (20 @ 06:30) (70% - 100%)  Wt(kg): --  Daily Height in cm: 175 (15 Isma 2020 15:12)    Daily Weight in k.3 (2020 00:01)      Physical Exam:  Gen: NAD; Patient resting comfortably  HEENT: PERRL, Normocephalic/ atraumatic  CV: RRR, normal S1 + S2, no m/r/g  Lungs:  Normal respiratory effort; mechanical ventilation via ETT  Abd: soft, non-tender; bowel sounds present  Ext: No edema; warm and well perfused    Telemetry: Sinus; no ectopy    Laboratory Data:                        8.6    25.73 )-----------( 309      ( 2020 00:14 )             25.9     -    142  |  101  |  23  ----------------------------<  113<H>  4.0   |  30  |  0.48<L>    Ca    7.6<L>      2020 00:14  Phos  2.3       Mg     2.0         TPro  5.5<L>  /  Alb  2.4<L>  /  TBili  1.0  /  DBili  0.5<H>  /  AST  12  /  ALT  13  /  AlkPhos  62  -    PT/INR - ( 2020 00:14 )   PT: 13.6 sec;   INR: 1.19 ratio         PTT - ( 2020 00:14 )  PTT:42.9 sec  CARDIAC MARKERS ( 15 Isma 2020 10:07 )  x     / x     / 49 U/L / x     / 7.8 ng/mL          Inpatient Medications:  MEDICATIONS  (STANDING):  acetaminophen   Tablet .. 975 milliGRAM(s) Oral every 6 hours  albuterol/ipratropium for Nebulization. 3 milliLiter(s) Nebulizer every 6 hours  buDESOnide    Inhalation Suspension 0.5 milliGRAM(s) Inhalation every 12 hours  chlorhexidine 0.12% Liquid 15 milliLiter(s) Oral Mucosa every 12 hours  chlorhexidine 2% Cloths 1 Application(s) Topical <User Schedule>  diphenoxylate/atropine 2 Tablet(s) Oral <User Schedule>  enoxaparin Injectable 40 milliGRAM(s) SubCutaneous daily  fentaNYL   Infusion 0.5 MICROgram(s)/kG/Hr (1.35 mL/Hr) IV Continuous <Continuous>  insulin lispro (HumaLOG) corrective regimen sliding scale   SubCutaneous every 6 hours  loperamide 16 milliGRAM(s) Oral <User Schedule>  norepinephrine Infusion 0.5 MICROgram(s)/kG/Min (50.625 mL/Hr) IV Continuous <Continuous>  octreotide  Injectable 100 MICROGram(s) SubCutaneous three times a day  opium Tincture 6 milliGRAM(s) Oral <User Schedule>  pantoprazole    Tablet 40 milliGRAM(s) Oral before breakfast  piperacillin/tazobactam IVPB.. 3.375 Gram(s) IV Intermittent every 8 hours  propofol Infusion 20 MICROgram(s)/kG/Min (6.504 mL/Hr) IV Continuous <Continuous>  sodium chloride 0.9%. 1000 milliLiter(s) (10 mL/Hr) IV Continuous <Continuous>  sodium chloride 0.9%. 1000 milliLiter(s) (75 mL/Hr) IV Continuous <Continuous>      Assessment: 74M  s/p ex lap, MIRYAM, closure of enterotomy, abthera vac placement     Problem/Plan - 1:  ·  Problem: Small bowel obstruction.  Plan: s/p ex lap, MIRYAM, closure of enterotomy, abthera vac placement  s/p washout  s/p PICC line placement . On TPN.      Problem/Plan - 2:  ·  Problem: Bradycardia.    Plan: No further episodes in the last 24 hours after review of telemetry strips.   Continue off AV thomas blockers. Monitor on telemetry.     Problem/Plan - 3:  ·  Problem: Pneumothorax.  Plan: stable  s/p VATS on 1/15.       Rest of care as per SICU.       Over 25 minutes spent on total encounter; more than 50% of the visit was spent counseling and/or coordinating care by the attending physician.      Juan Carlos Mascorro MD [facility]  Cardiovascular Disease  [phone]

## 2020-01-17 LAB
ALBUMIN SERPL ELPH-MCNC: 2.1 G/DL — LOW (ref 3.3–5)
ALP SERPL-CCNC: 52 U/L — SIGNIFICANT CHANGE UP (ref 40–120)
ALT FLD-CCNC: 15 U/L — SIGNIFICANT CHANGE UP (ref 10–45)
ANION GAP SERPL CALC-SCNC: 6 MMOL/L — SIGNIFICANT CHANGE UP (ref 5–17)
APTT BLD: 47.1 SEC — HIGH (ref 27.5–36.3)
AST SERPL-CCNC: 15 U/L — SIGNIFICANT CHANGE UP (ref 10–40)
BILIRUB DIRECT SERPL-MCNC: 0.5 MG/DL — HIGH (ref 0–0.2)
BILIRUB INDIRECT FLD-MCNC: 0.5 MG/DL — SIGNIFICANT CHANGE UP (ref 0.2–1)
BILIRUB SERPL-MCNC: 1 MG/DL — SIGNIFICANT CHANGE UP (ref 0.2–1.2)
BUN SERPL-MCNC: 17 MG/DL — SIGNIFICANT CHANGE UP (ref 7–23)
CALCIUM SERPL-MCNC: 7.8 MG/DL — LOW (ref 8.4–10.5)
CHLORIDE SERPL-SCNC: 103 MMOL/L — SIGNIFICANT CHANGE UP (ref 96–108)
CO2 SERPL-SCNC: 33 MMOL/L — HIGH (ref 22–31)
CREAT SERPL-MCNC: 0.54 MG/DL — SIGNIFICANT CHANGE UP (ref 0.5–1.3)
GAS PNL BLDA: SIGNIFICANT CHANGE UP
GAS PNL BLDA: SIGNIFICANT CHANGE UP
GLUCOSE BLDC GLUCOMTR-MCNC: 104 MG/DL — HIGH (ref 70–99)
GLUCOSE BLDC GLUCOMTR-MCNC: 124 MG/DL — HIGH (ref 70–99)
GLUCOSE BLDC GLUCOMTR-MCNC: 164 MG/DL — HIGH (ref 70–99)
GLUCOSE BLDC GLUCOMTR-MCNC: 91 MG/DL — SIGNIFICANT CHANGE UP (ref 70–99)
GLUCOSE SERPL-MCNC: 113 MG/DL — HIGH (ref 70–99)
HCT VFR BLD CALC: 23 % — LOW (ref 39–50)
HCT VFR BLD CALC: 26.7 % — LOW (ref 39–50)
HGB BLD-MCNC: 7.4 G/DL — LOW (ref 13–17)
HGB BLD-MCNC: 8.5 G/DL — LOW (ref 13–17)
INR BLD: 1.29 RATIO — HIGH (ref 0.88–1.16)
MAGNESIUM SERPL-MCNC: 1.7 MG/DL — SIGNIFICANT CHANGE UP (ref 1.6–2.6)
MCHC RBC-ENTMCNC: 31.3 PG — SIGNIFICANT CHANGE UP (ref 27–34)
MCHC RBC-ENTMCNC: 31.5 PG — SIGNIFICANT CHANGE UP (ref 27–34)
MCHC RBC-ENTMCNC: 31.8 GM/DL — LOW (ref 32–36)
MCHC RBC-ENTMCNC: 32.2 GM/DL — SIGNIFICANT CHANGE UP (ref 32–36)
MCV RBC AUTO: 97.9 FL — SIGNIFICANT CHANGE UP (ref 80–100)
MCV RBC AUTO: 98.2 FL — SIGNIFICANT CHANGE UP (ref 80–100)
NRBC # BLD: 0 /100 WBCS — SIGNIFICANT CHANGE UP (ref 0–0)
NRBC # BLD: 0 /100 WBCS — SIGNIFICANT CHANGE UP (ref 0–0)
PHOSPHATE SERPL-MCNC: 2.7 MG/DL — SIGNIFICANT CHANGE UP (ref 2.5–4.5)
PLATELET # BLD AUTO: 217 K/UL — SIGNIFICANT CHANGE UP (ref 150–400)
PLATELET # BLD AUTO: 286 K/UL — SIGNIFICANT CHANGE UP (ref 150–400)
POTASSIUM SERPL-MCNC: 3.6 MMOL/L — SIGNIFICANT CHANGE UP (ref 3.5–5.3)
POTASSIUM SERPL-SCNC: 3.6 MMOL/L — SIGNIFICANT CHANGE UP (ref 3.5–5.3)
PROT SERPL-MCNC: 4.9 G/DL — LOW (ref 6–8.3)
PROTHROM AB SERPL-ACNC: 15 SEC — HIGH (ref 10–12.9)
RBC # BLD: 2.35 M/UL — LOW (ref 4.2–5.8)
RBC # BLD: 2.72 M/UL — LOW (ref 4.2–5.8)
RBC # FLD: 14.6 % — HIGH (ref 10.3–14.5)
RBC # FLD: 14.6 % — HIGH (ref 10.3–14.5)
SODIUM SERPL-SCNC: 142 MMOL/L — SIGNIFICANT CHANGE UP (ref 135–145)
WBC # BLD: 12.35 K/UL — HIGH (ref 3.8–10.5)
WBC # BLD: 12.93 K/UL — HIGH (ref 3.8–10.5)
WBC # FLD AUTO: 12.35 K/UL — HIGH (ref 3.8–10.5)
WBC # FLD AUTO: 12.93 K/UL — HIGH (ref 3.8–10.5)

## 2020-01-17 PROCEDURE — 99232 SBSQ HOSP IP/OBS MODERATE 35: CPT

## 2020-01-17 PROCEDURE — 99233 SBSQ HOSP IP/OBS HIGH 50: CPT

## 2020-01-17 PROCEDURE — 71045 X-RAY EXAM CHEST 1 VIEW: CPT | Mod: 26

## 2020-01-17 RX ORDER — POTASSIUM CHLORIDE 20 MEQ
10 PACKET (EA) ORAL
Refills: 0 | Status: COMPLETED | OUTPATIENT
Start: 2020-01-17 | End: 2020-01-17

## 2020-01-17 RX ORDER — SODIUM CHLORIDE 9 MG/ML
1000 INJECTION INTRAMUSCULAR; INTRAVENOUS; SUBCUTANEOUS
Refills: 0 | Status: DISCONTINUED | OUTPATIENT
Start: 2020-01-17 | End: 2020-01-18

## 2020-01-17 RX ORDER — POTASSIUM PHOSPHATE, MONOBASIC POTASSIUM PHOSPHATE, DIBASIC 236; 224 MG/ML; MG/ML
15 INJECTION, SOLUTION INTRAVENOUS ONCE
Refills: 0 | Status: COMPLETED | OUTPATIENT
Start: 2020-01-17 | End: 2020-01-17

## 2020-01-17 RX ORDER — MAGNESIUM SULFATE 500 MG/ML
2 VIAL (ML) INJECTION ONCE
Refills: 0 | Status: COMPLETED | OUTPATIENT
Start: 2020-01-17 | End: 2020-01-17

## 2020-01-17 RX ORDER — FUROSEMIDE 40 MG
20 TABLET ORAL ONCE
Refills: 0 | Status: COMPLETED | OUTPATIENT
Start: 2020-01-17 | End: 2020-01-17

## 2020-01-17 RX ADMIN — POTASSIUM PHOSPHATE, MONOBASIC POTASSIUM PHOSPHATE, DIBASIC 62.5 MILLIMOLE(S): 236; 224 INJECTION, SOLUTION INTRAVENOUS at 07:06

## 2020-01-17 RX ADMIN — Medication 3 MILLILITER(S): at 17:56

## 2020-01-17 RX ADMIN — Medication 3 MILLILITER(S): at 01:11

## 2020-01-17 RX ADMIN — SODIUM CHLORIDE 30 MILLILITER(S): 9 INJECTION INTRAMUSCULAR; INTRAVENOUS; SUBCUTANEOUS at 11:05

## 2020-01-17 RX ADMIN — Medication 50 GRAM(S): at 04:03

## 2020-01-17 RX ADMIN — PIPERACILLIN AND TAZOBACTAM 25 GRAM(S): 4; .5 INJECTION, POWDER, LYOPHILIZED, FOR SOLUTION INTRAVENOUS at 23:06

## 2020-01-17 RX ADMIN — ENOXAPARIN SODIUM 40 MILLIGRAM(S): 100 INJECTION SUBCUTANEOUS at 11:06

## 2020-01-17 RX ADMIN — Medication 16 MILLIGRAM(S): at 17:07

## 2020-01-17 RX ADMIN — Medication 1: at 17:07

## 2020-01-17 RX ADMIN — Medication 975 MILLIGRAM(S): at 23:05

## 2020-01-17 RX ADMIN — Medication 975 MILLIGRAM(S): at 11:06

## 2020-01-17 RX ADMIN — Medication 975 MILLIGRAM(S): at 05:30

## 2020-01-17 RX ADMIN — Medication 100 MILLIEQUIVALENT(S): at 03:31

## 2020-01-17 RX ADMIN — Medication 16 MILLIGRAM(S): at 23:07

## 2020-01-17 RX ADMIN — Medication 975 MILLIGRAM(S): at 17:06

## 2020-01-17 RX ADMIN — Medication 20 MILLIGRAM(S): at 11:06

## 2020-01-17 RX ADMIN — Medication 100 MILLIEQUIVALENT(S): at 02:45

## 2020-01-17 RX ADMIN — MORPHINE 6 MILLIGRAM(S): 10 SOLUTION ORAL at 07:46

## 2020-01-17 RX ADMIN — OCTREOTIDE ACETATE 100 MICROGRAM(S): 200 INJECTION, SOLUTION INTRAVENOUS; SUBCUTANEOUS at 23:05

## 2020-01-17 RX ADMIN — Medication 2 TABLET(S): at 17:06

## 2020-01-17 RX ADMIN — Medication 3 MILLILITER(S): at 11:54

## 2020-01-17 RX ADMIN — MORPHINE 6 MILLIGRAM(S): 10 SOLUTION ORAL at 13:08

## 2020-01-17 RX ADMIN — SODIUM CHLORIDE 10 MILLILITER(S): 9 INJECTION INTRAMUSCULAR; INTRAVENOUS; SUBCUTANEOUS at 11:06

## 2020-01-17 RX ADMIN — Medication 250 MILLIGRAM(S): at 02:08

## 2020-01-17 RX ADMIN — Medication 975 MILLIGRAM(S): at 23:08

## 2020-01-17 RX ADMIN — OCTREOTIDE ACETATE 100 MICROGRAM(S): 200 INJECTION, SOLUTION INTRAVENOUS; SUBCUTANEOUS at 05:30

## 2020-01-17 RX ADMIN — Medication 2 TABLET(S): at 23:05

## 2020-01-17 RX ADMIN — Medication 2 TABLET(S): at 13:08

## 2020-01-17 RX ADMIN — Medication 0.5 MILLIGRAM(S): at 17:56

## 2020-01-17 RX ADMIN — PIPERACILLIN AND TAZOBACTAM 25 GRAM(S): 4; .5 INJECTION, POWDER, LYOPHILIZED, FOR SOLUTION INTRAVENOUS at 13:08

## 2020-01-17 RX ADMIN — CHLORHEXIDINE GLUCONATE 1 APPLICATION(S): 213 SOLUTION TOPICAL at 05:30

## 2020-01-17 RX ADMIN — MORPHINE 6 MILLIGRAM(S): 10 SOLUTION ORAL at 23:10

## 2020-01-17 RX ADMIN — PIPERACILLIN AND TAZOBACTAM 25 GRAM(S): 4; .5 INJECTION, POWDER, LYOPHILIZED, FOR SOLUTION INTRAVENOUS at 05:30

## 2020-01-17 RX ADMIN — Medication 250 MILLIGRAM(S): at 13:08

## 2020-01-17 RX ADMIN — PANTOPRAZOLE SODIUM 40 MILLIGRAM(S): 20 TABLET, DELAYED RELEASE ORAL at 07:45

## 2020-01-17 RX ADMIN — MORPHINE 6 MILLIGRAM(S): 10 SOLUTION ORAL at 17:06

## 2020-01-17 RX ADMIN — Medication 3 MILLILITER(S): at 23:53

## 2020-01-17 RX ADMIN — Medication 16 MILLIGRAM(S): at 07:47

## 2020-01-17 RX ADMIN — Medication 2 TABLET(S): at 07:46

## 2020-01-17 RX ADMIN — Medication 100 MILLIEQUIVALENT(S): at 02:08

## 2020-01-17 RX ADMIN — Medication 975 MILLIGRAM(S): at 11:36

## 2020-01-17 RX ADMIN — Medication 0.5 MILLIGRAM(S): at 06:04

## 2020-01-17 RX ADMIN — Medication 16 MILLIGRAM(S): at 13:09

## 2020-01-17 RX ADMIN — Medication 975 MILLIGRAM(S): at 17:36

## 2020-01-17 RX ADMIN — OCTREOTIDE ACETATE 100 MICROGRAM(S): 200 INJECTION, SOLUTION INTRAVENOUS; SUBCUTANEOUS at 14:54

## 2020-01-17 RX ADMIN — Medication 3 MILLILITER(S): at 06:05

## 2020-01-17 NOTE — PROGRESS NOTE ADULT - ASSESSMENT
A/P: 75 year old male w/ PMH of COPD and EtOH dependence with PSH/o appy, prostate surgery, & spine surgery  septic shock and high grade SBO s/p exploratory laparotomy, lysis of adhesions, decompression of bowel via enterotomy w/ primary repair, and Abthera VAC placement on 12/6; s/p take down of Abthera, washout and re-application of the Abthera vac on 12/8. Now s/p SBR and end ileostomy on 12/10.   TPN consulted to assist w/ management of pt's nutrition in pt w/ prolonged hospital course now tolerating diet but has high Ileostomy output.  Pt s/p Lt Chest Pigtail for effusion in IR with persistent high output on 1/10/2020 pt had Video assisted thorascopic placement of Left PleurX catheter    Pt remains in SICU - after Lt chest Evacuation of 2L blood w/ placement of CT x2 on 1/15/2020  Pt w/ improving severe Protein-Calorie Malnutrition- Diet Advance to Soft Mechanical               - Will monitor off TPN               - Calorie Count initiated   Estimated Needs: based on pt's dosing wt 54.2Kg, 3690-3405 gregorio/day (25-30cal/Kg); 81-108g                        protein/day (1.5-2Gm/Kg)  Fecal fat testing suggestive of decreased absorption of fat           VItal 1.2 PO BID           Promote PO BID            MCT 15mL in setting of poor absorption of LCT.  High Ostomy Output and Chest Tube output w/ suspected increased protein losses           Prosource PO BID supplements  Consider testing levels of fat soluble vitamins          Vit D & PTH, Vit E, and Vit A          PT/ INR is within acceptable levels suggesting Vit K absorption  Strict Intake and Output -             Increased ostomy output- diet adanced -replete as per ICU (NS IVF 0.5:1 IVF q6h)            Continue to monitor while on octreotide, lomotil, tincture of opium, & imodium  BLE Edema- will continue to monitor off TPN           Lasix given x 1  Low K, Mg, Phos-  repleted as per SICU -continue to monitor   Leukocytosis- improving - sputum cx w/ Gram positive cocci in pairs & Gram Negative Rods       on Zosyn- 3more days       Vanco added - Vanco T ordered for tomorrow am       close monitoring for renal impairment  Hyperglycemia-improving      Fingersticks & ISS coverage - as per SICU  RUE PICC - maintenance as per protocol  RUE edema- improving, continue elevation, Duplex negative  To continue monitoring of nutrition - Weights three times a week; Daily CMP, Mg, Ionized Ca, Phosphorus, and Weekly Triglycerides and Pre-albumin  Continue as per SICU/Surgery, will follow with you, D/w primary team    Andreina Hubbard PA-C  TPN team, pager [phone]  D/w Dr Chacko A/P: 75 year old male w/ PMH of COPD and EtOH dependence with PSH/o appy, prostate surgery, & spine surgery  septic shock and high grade SBO s/p exploratory laparotomy, lysis of adhesions, decompression of bowel via enterotomy w/ primary repair, and Abthera VAC placement on 12/6; s/p take down of Abthera, washout and re-application of the Abthera vac on 12/8. Now s/p SBR and end ileostomy on 12/10.   TPN consulted to assist w/ management of pt's nutrition in pt w/ prolonged hospital course now tolerating diet but has high Ileostomy output.  Pt s/p Lt Chest Pigtail for effusion in IR with persistent high output on 1/10/2020 pt had Video assisted thorascopic placement of Left PleurX catheter    Pt remains in SICU - after Lt chest Evacuation of 2L blood w/ placement of CT x2 on 1/15/2020  Pt w/ improving severe Protein-Calorie Malnutrition- Diet advanced to Soft Mechanical               - Will monitor off TPN               - Calorie Count initiated   Estimated Needs: based on pt's dosing wt 54.2Kg, 9818-2679 gregorio/day (25-30cal/Kg); 81-108g                        protein/day (1.5-2Gm/Kg)  Fecal fat testing suggestive of decreased absorption of fat           VItal 1.2 PO BID           Promote PO BID            MCT 15mL in setting of poor absorption of LCT.  High Ostomy Output and Chest Tube output w/ suspected increased protein losses           Prosource PO BID supplements  Consider testing levels of fat soluble vitamins          Vit D & PTH, Vit E, and Vit A          PT/ INR is within acceptable levels suggesting Vit K absorption  Strict Intake and Output -             Increased ostomy output- diet adanced -replete as per ICU (NS IVF 0.5:1 IVF q6h)            Continue to monitor while on octreotide, lomotil, tincture of opium, & imodium  BLE Edema- will continue to monitor off TPN           Lasix given x 1  Low K, Mg, Phos-  repleted as per SICU -continue to monitor   Leukocytosis- improving - sputum cx w/ Gram positive cocci in pairs & Gram Negative Rods       on Zosyn- 3more days       Vanco added - Vanco T ordered for tomorrow am       close monitoring for renal impairment  Hyperglycemia-improving      Fingersticks & ISS coverage - as per SICU  RUE PICC - maintenance as per protocol  RUE edema- improving, continue elevation, Duplex negative  To continue monitoring of nutrition - Weights three times a week; Daily CMP, Mg, Ionized Ca, Phosphorus, and Weekly Triglycerides and Pre-albumin  Continue as per SICU/Surgery, will follow with you, D/w primary team    Andreina Hubbard PA-C  TPN team, pager [phone]  D/w Dr Chacko A/P: 75 year old male w/ PMH of COPD and EtOH dependence with PSH/o appy, prostate surgery, & spine surgery  septic shock and high grade SBO s/p exploratory laparotomy, lysis of adhesions, decompression of bowel via enterotomy w/ primary repair, and Abthera VAC placement on 12/6; s/p take down of Abthera, washout and re-application of the Abthera vac on 12/8. Now s/p SBR and end ileostomy on 12/10.   TPN consulted to assist w/ management of pt's nutrition in pt w/ prolonged hospital course now tolerating diet but has high Ileostomy output.  Pt s/p Lt Chest Pigtail for effusion in IR with persistent high output on 1/10/2020 pt had Video assisted thorascopic placement of Left PleurX catheter    Pt remains in SICU - after Lt chest Evacuation of 2L blood w/ placement of CT x2 on 1/15/2020  Pt w/ improving severe Protein-Calorie Malnutrition- Diet advanced to Soft Mechanical               - Will monitor off TPN               - Calorie Count initiated   Estimated Needs: based on pt's dosing wt 54.2Kg, 3693-1452 gregorio/day (25-30cal/Kg); 81-108g                        protein/day (1.5-2Gm/Kg)  Fecal fat testing suggestive of decreased absorption of fat - consider adding these supplements           VItal 1.2 PO BID           Promote PO BID            MCT 15mL PO (in setting of poor absorption of LCT.)  High Ostomy Output and Chest Tube output w/ suspected increased protein losses           Prosource PO BID protien supplements  Consider testing levels of fat soluble vitamins          Vit D & PTH, Vit E, and Vit A          PT/ INR is within acceptable levels suggesting Vit K absorption  Strict Intake and Output -             Increased ostomy output- diet advanced -replete as per ICU (NS IVF 0.5:1 IVF q6h)            Continue to monitor while on octreotide, lomotil, tincture of opium, & imodium  BLE Edema- will continue to monitor off TPN           Lasix given x 1  Low K, Mg, Phos-  repleted as per SICU -continue to monitor   Leukocytosis- improving - sputum cx w/ Gram positive cocci in pairs & Gram Negative Rods       on Zosyn- 3more days       Vanco added - Vanco T ordered for tomorrow am       close monitoring for renal impairment  Hyperglycemia-improving      Fingersticks & ISS coverage - as per SICU  RUE PICC - maintenance as per protocol  RUE edema- improving, continue elevation, Duplex negative  To continue monitoring of nutrition - Weights three times a week; Daily CMP, Mg, Ionized Ca, Phosphorus,        and Weekly Triglycerides and Pre-albumin  Continue as per SICU/Surgery, will follow with you, D/w primary team    Andreina Hubbard PA-C  TPN team, pager [phone]  D/w Dr Chacko

## 2020-01-17 NOTE — PROGRESS NOTE ADULT - SUBJECTIVE AND OBJECTIVE BOX
HISTORY  75y Male abdominal pain, nausea, hematemesis, and poor PO intake for ~2-3 days. Labs significant for an CHI w/ Cr 2.74 and lactate of 9.4. He was also notably tachycardic to the 110s and hypotensive w/ SBP in the 70s. He was given 2 units of PRBCs and 2 L of LR in the ED due to concern for upper GI bleeding. Imaging revealed high grade SBO in the RLQ. Patient was taken to the OR emergently for an exploratory laparotomy, lysis of adhesions, decompression of bowel via enterotomy w/ primary repair, and Abthera VAC placement. Of note, the distal 50% of the bowel appeared dusky but viable. He required vasopressor support with phenylephrine and vasopressin infusions. He received 3000 mL of crystalloid w/ EBL of 10 mL and UOP of 25 mL. Patient was left intubated at the end of the case so SICU was consulted for hemodynamic monitoring. Taken back to the OR on 12/8 and underwent take down of Abthera, washout and re-application of the Abthera vac. Went back to OR on 12/10 night for colectomy. Post-op course complicated by short gut syndrome with high ileostomy output as well as left pleural effusion requiring IR guided pigtail catheter placement 12/30. Patient then had VATS on left side for PleurX catheter placement. 01/10.         24 HOUR EVENTS:  - Extubated   - Levophed weaned off  - Vancomycin added for gram positive cocci in BCx.     SUBJECTIVE/ROS:  [ ] A ten-point review of systems was otherwise negative except as noted.  [ ] Due to altered mental status/intubation, subjective information were not able to be obtained from the patient. History was obtained, to the extent possible, from review of the chart and collateral sources of information.      NEURO  Exam: awake, alert, oriented  Meds: acetaminophen   Tablet .. 975 milliGRAM(s) Oral every 6 hours  ondansetron Injectable 4 milliGRAM(s) IV Push every 6 hours PRN Nausea and/or Vomiting  opium Tincture 6 milliGRAM(s) Oral <User Schedule>    [x] Adequacy of sedation and pain control has been assessed and adjusted      RESPIRATORY  RR: 23 (01-17-20 @ 00:00) (15 - 35)  SpO2: 99% (01-17-20 @ 00:00) (92% - 100%)  Exam: unlabored, clear to auscultation bilaterally  Mechanical Ventilation: Mode: 40% Aerosol, RR (patient): 22  ABG - ( 17 Jan 2020 00:07 )  pH: 7.38  /  pCO2: 61    /  pO2: 95    / HCO3: 35    / Base Excess: 9.3   /  SaO2: 98      Lactate: x      [N/A] Extubation Readiness Assessed  Meds: albuterol/ipratropium for Nebulization. 3 milliLiter(s) Nebulizer every 6 hours  buDESOnide    Inhalation Suspension 0.5 milliGRAM(s) Inhalation every 12 hours        CARDIOVASCULAR  HR: 85 (01-17-20 @ 00:00) (61 - 98)  BP: 116/56 (01-16-20 @ 20:00) (116/56 - 116/56)  BP(mean): 81 (01-16-20 @ 20:00) (81 - 81)  ABP: 112/43 (01-17-20 @ 00:00) (99/44 - 134/49)  ABP(mean): 71 (01-17-20 @ 00:00) (65 - 86)  Exam: regular rate and rhythm  Cardiac Rhythm: sinus  Perfusion     [x]Adequate   [ ]Inadequate  Mentation   [x]Normal       [ ]Reduced  Extremities  [x]Warm         [ ]Cool  Volume Status [ ]Hypervolemic [x]Euvolemic [ ]Hypovolemic  Meds: norepinephrine Infusion 0.5 MICROgram(s)/kG/Min IV Continuous <Continuous>        GI/NUTRITION  Exam: soft, nontender, nondistended  DIET: Dsyphagia diet   Meds: diphenoxylate/atropine 2 Tablet(s) Oral <User Schedule>  loperamide 16 milliGRAM(s) Oral <User Schedule>  pantoprazole    Tablet 40 milliGRAM(s) Oral before breakfast      GENITOURINARY  I&O's Detail    01-15 @ 07:01  -  01-16 @ 07:00  --------------------------------------------------------  IN:    fentaNYL  Infusion: 16.8 mL    norepinephrine Infusion: 175.2 mL    propofol Infusion: 151.7 mL    sodium chloride 0.9%.: 1200 mL    sodium chloride 0.9%.: 120 mL    Solution: 250 mL    Solution: 110 mL    TPN (Total Parenteral Nutrition): 350 mL  Total IN: 2373.7 mL    OUT:    Chest Tube: 100 mL    Chest Tube: 150 mL    Ileostomy: 260 mL    Indwelling Catheter - Urethral: 355 mL    Voided: 500 mL  Total OUT: 1365 mL    Total NET: 1008.7 mL      01-16 @ 07:01  -  01-17 @ 00:34  --------------------------------------------------------  IN:    fentaNYL  Infusion: 1.4 mL    norepinephrine Infusion: 57.9 mL    Oral Fluid: 850 mL    propofol Infusion: 19.4 mL    sodium chloride 0.9%.: 1275 mL    Solution: 200 mL    Solution: 700 mL    Solution: 537.5 mL  Total IN: 3641.2 mL    OUT:    Chest Tube: 100 mL    Chest Tube: 40 mL    Ileostomy: 1400 mL    Indwelling Catheter - Urethral: 675 mL  Total OUT: 2215 mL    Total NET: 1426.2 mL          01-16    142  |  101  |  23  ----------------------------<  113<H>  4.0   |  30  |  0.48<L>    Ca    7.6<L>      16 Jan 2020 00:14  Phos  2.3     01-16  Mg     2.0     01-16    TPro  5.5<L>  /  Alb  2.4<L>  /  TBili  1.0  /  DBili  0.5<H>  /  AST  12  /  ALT  13  /  AlkPhos  62  01-16    [x] Martinez catheter, indication: urine output monitoring in critically ill   Meds: sodium chloride 0.9%. 1000 milliLiter(s) IV Continuous <Continuous>  sodium chloride 0.9%. 1000 milliLiter(s) IV Continuous <Continuous>        HEMATOLOGIC  Meds: enoxaparin Injectable 40 milliGRAM(s) SubCutaneous daily    [x] VTE Prophylaxis                        7.4    12.93 )-----------( 217      ( 17 Jan 2020 00:12 )             23.0     PT/INR - ( 17 Jan 2020 00:12 )   PT: 15.0 sec;   INR: 1.29 ratio         PTT - ( 17 Jan 2020 00:12 )  PTT:47.1 sec  Transfusion     [ ] PRBC   [ ] Platelets   [ ] FFP   [ ] Cryoprecipitate      INFECTIOUS DISEASES  WBC Count: 12.93 K/uL (01-17 @ 00:12)  WBC Count: 15.05 K/uL (01-16 @ 12:47)    RECENT CULTURES:  Specimen Source: Bronch Wash Combicath  Date/Time: 01-16 @ 06:43  Culture Results: --  Gram Stain:   Moderate polymorphonuclear leukocytes per low power field  Few Squamous epithelial cells per low power field  Rare Gram positive cocci in pairs  Rare Gram Negative Rods  Organism: --  Specimen Source: .Blood Blood-Arterial  Date/Time: 01-15 @ 18:04  Culture Results:   No growth to date.  Gram Stain: --  Organism: --  Specimen Source: .Blood Blood-Peripheral  Date/Time: 01-15 @ 10:09  Culture Results:   Testing in progress  Gram Stain: --  Organism: --  Specimen Source: .Blood Blood  Date/Time: 01-13 @ 18:15  Culture Results:   Testing in progress  Gram Stain: --  Organism: --  Specimen Source: .Blood Blood-Peripheral  Date/Time: 01-13 @ 16:31  Culture Results:   No growth to date.  Gram Stain: --  Organism: --  Specimen Source: .Blood Blood-Peripheral  Date/Time: 01-13 @ 06:13  Culture Results:   No growth to date.  Gram Stain: --  Organism: --    Meds: piperacillin/tazobactam IVPB.. 3.375 Gram(s) IV Intermittent every 8 hours  vancomycin  IVPB 750 milliGRAM(s) IV Intermittent every 12 hours        ENDOCRINE  CAPILLARY BLOOD GLUCOSE      POCT Blood Glucose.: 116 mg/dL (16 Jan 2020 18:56)  POCT Blood Glucose.: 113 mg/dL (16 Jan 2020 11:22)  POCT Blood Glucose.: 115 mg/dL (16 Jan 2020 06:23)    Meds: insulin lispro (HumaLOG) corrective regimen sliding scale   SubCutaneous every 6 hours  octreotide  Injectable 100 MICROGram(s) SubCutaneous three times a day        ACCESS DEVICES:  [x] Peripheral IV  [ ] Central Venous Line	[ ] R	[ ] L	[ ] IJ	[ ] Fem	[ ] SC	Placed:   [ ] Arterial Line		[ ] R	[ ] L	[ ] Fem	[ ] Rad	[ ] Ax	Placed:   [ ] PICC:					[ ] Mediport  [ ] Urinary Catheter, Date Placed:   [x] Necessity of urinary, arterial, and venous catheters discussed    OTHER MEDICATIONS:  chlorhexidine 2% Cloths 1 Application(s) Topical <User Schedule>      CODE STATUS: full code       IMAGING: < from: CT Abdomen and Pelvis w/ IV Cont (01.15.20 @ 13:25) >  IMPRESSION:     Loculated large left pleural effusion with areas of hyperattenuation likely secondary to hematoma. Interval increase in size of the loculated left pleural effusion since 12/30/2019. Left sided chest tube terminating in the pleural space. Trace left pneumothorax.     Extensive left subcutaneous emphysema. Hematoma is also noted along the left lateral chest wall.    Interval resolution of the midline anterior abdominal wall fluid collection.    Markedly distended urinary bladder with mild bilateral hydronephrosis.    A small droplet of gas in the right anterior abdomen adjacent to the ileostomy may be extraluminal. Repeat CT with oral contrast may be helpful for further evaluation.    < end of copied text >

## 2020-01-17 NOTE — PROGRESS NOTE ADULT - PROBLEM SELECTOR PLAN 1
s/p 1/10 Left VATS pleurx catheter placement  s/p 1/15 L VATS evacuation L hemothorax - intubated  1/17 chest tubes to water seal   Strict I & O's- monitor drainage from chest tubes hourly  Daily CXR  Continue management as per primary team

## 2020-01-17 NOTE — CHART NOTE - NSCHARTNOTEFT_GEN_A_CORE
Brief Note. Calorie Count ordered 1/17 - 1/19; communicated with RN and 8ICU team.    Chart reviewed; events noted.     Diet: Mechanical Soft with 3 servings Ensure Enlive (350cal, 20Gm protein per 8oz serving)     Per fecal fat test, pt with suspected fat malabsorption. Recommend add supplements with high ratio of MCT to LCT, including Promote, cans of Vital1.2 (for ORAL intake), and 15ml MCT oil.    Pt with suspected protein losses in fluid output. Recommend increase protein supplementation with 2 servings Prosource (60cal, 15Gm protein).    MEDS/LABS reviewed.    Estimated Needs: based on dosing wt 54.2Kg, with consideration for malnutrition/malabsorption  2194-2426 gregorio/day (25-30cal/Kg)   Gm protein/day (1.5-2Gm/Kg)    Recommend  1) Continue Mechanical Soft diet (per 1/11 SLP evaluation). Monitor results of Calorie Count.  2) Recommend the following supplements, in setting of malabsorption:    Ensure Enlive x 2  Promote x 2   Vital1.2 cans x 2  MCT oil 15ml  Prosource x 2    RD remains available upon request and will follow up per protocol.    Sowmya Graham, MS RD CDN [facility], Pager # [phone].

## 2020-01-17 NOTE — PROGRESS NOTE ADULT - SUBJECTIVE AND OBJECTIVE BOX
Patient is a 75y old  Male who presents with a chief complaint of abd. pain (17 Jan 2020 13:41)      SUBJECTIVE: "I feel ok  "    Vital Signs Last 24 Hrs  T(C): 36.9 (01-17-20 @ 19:00), Max: 37.5 (01-17-20 @ 03:00)  T(F): 98.4 (01-17-20 @ 19:00), Max: 99.5 (01-17-20 @ 03:00)  HR: 123 (01-17-20 @ 21:00) (66 - 123)  BP: 114/57 (01-17-20 @ 07:00) (114/57 - 114/57)  RR: 29 (01-17-20 @ 21:00) (16 - 29)  SpO2: 100% (01-17-20 @ 21:00) (93% - 100%)                01-16-20 @ 07:01  -  01-17-20 @ 07:00  --------------------------------------------------------  IN: 5063.7 mL / OUT: 3025 mL / NET: 2038.7 mL    01-17-20 @ 07:01  -  01-17-20 @ 22:06  --------------------------------------------------------  IN: 2387.5 mL / OUT: 3720 mL / NET: -1332.5 mL        Daily     Daily                           8.5    12.35 )-----------( 286      ( 17 Jan 2020 12:15 )             26.7     01-17    142  |  103  |  17  ----------------------------<  113<H>  3.6   |  33<H>  |  0.54    Ca    7.8<L>      17 Jan 2020 00:12  Phos  2.7     01-17  Mg     1.7     01-17    TPro  4.9<L>  /  Alb  2.1<L>  /  TBili  1.0  /  DBili  0.5<H>  /  AST  15  /  ALT  15  /  AlkPhos  52  01-17            PHYSICAL EXAM  Neurology: A&Ox3, NAD, no gross deficits  CV : RRR+S1S2  Lungs: Respirations non-labored, B/L BS + mech vent on cpap  Abdomen: Soft, NT/ND, +BSx4Q + ileostomy  Extremities: +1  B/L LE edema, negative calf tenderness, +PP     L fem art line  RUE PICC        CHEST TUBES:  Left Apical  CT     [  ]LWS  [ x ]H2O seal + airleak 240 ml drainage  Left Basal CT   [  ]LWS  [ x ]H2O seal no airleak  100 ml drainage          MEDICATIONS  acetaminophen   Tablet .. 975 milliGRAM(s) Oral every 6 hours  albuterol/ipratropium for Nebulization. 3 milliLiter(s) Nebulizer every 6 hours  buDESOnide    Inhalation Suspension 0.5 milliGRAM(s) Inhalation every 12 hours  chlorhexidine 2% Cloths 1 Application(s) Topical <User Schedule>  diphenoxylate/atropine 2 Tablet(s) Oral <User Schedule>  enoxaparin Injectable 40 milliGRAM(s) SubCutaneous daily  insulin lispro (HumaLOG) corrective regimen sliding scale   SubCutaneous every 6 hours  loperamide 16 milliGRAM(s) Oral <User Schedule>  octreotide  Injectable 100 MICROGram(s) SubCutaneous three times a day  ondansetron Injectable 4 milliGRAM(s) IV Push every 6 hours PRN  opium Tincture 6 milliGRAM(s) Oral <User Schedule>  pantoprazole    Tablet 40 milliGRAM(s) Oral before breakfast  piperacillin/tazobactam IVPB.. 3.375 Gram(s) IV Intermittent every 8 hours  sodium chloride 0.9%. 1000 milliLiter(s) IV Continuous <Continuous>  sodium chloride 0.9%. 1000 milliLiter(s) IV Continuous <Continuous>  vancomycin  IVPB 750 milliGRAM(s) IV Intermittent every 12 hours

## 2020-01-17 NOTE — PROGRESS NOTE ADULT - SUBJECTIVE AND OBJECTIVE BOX
Patient is a 75y old  Male who presents with a chief complaint of abd. pain (2020 13:14)    f/u leukocytosis and +fungitell    Interval History/ROS:  extubated.  CT x2.  SOB better this morning.  Rest of ROS otherwise negative.    PAST MEDICAL & SURGICAL HISTORY:  COPD with hypoxia  ETOH abuse  History of lumbosacral spine surgery  History of prostate surgery  History of appendectomy    Allergies  IV Contrast (Hives)    ANTIMICROBIALS:  acyclovir   Oral Tab/Cap (-)  cefTRIAXone   IVPB (12/6 x1)  piperacillin/tazobactam IVPB (-12/15)  ceFAZolin   IVPB (-)  vancomycin  IVPB (1/13 x1)    active  piperacillin/tazobactam IVPB.. 3.375 every 8 hours (-)  vancomycin  IVPB 750 every 12 hours (-)    MEDICATIONS  (STANDING):  acetaminophen   Tablet .. 975 every 6 hours  albuterol/ipratropium for Nebulization. 3 every 6 hours  buDESOnide    Inhalation Suspension 0.5 every 12 hours  diphenoxylate/atropine 2 <User Schedule>  enoxaparin Injectable 40 daily  insulin lispro (HumaLOG) corrective regimen sliding scale  every 6 hours  loperamide 16 <User Schedule>  octreotide  Injectable 100 three times a day  opium Tincture 6 <User Schedule>  pantoprazole    Tablet 40 before breakfast    Vital Signs Last 24 Hrs  T(F): 97.7 (20 @ 11:00), Max: 99.7 (20 @ 15:00)  HR: 76 (20 @ 13:00)  BP: 114/57 (20 @ 07:00)  RR: 20 (20 @ 13:00)  SpO2: 95% (20 @ 13:00) (92% - 100%)    PHYSICAL EXAM:  Constitutional: cachectic, in chair  HEAD/EYES:  anicteric, no conjunctival injection  ENT:  supple, orally intubated  Cardiovascular:   normal S1, S2, no murmur, (+) edema  Respiratory:  coarse b/l breath sounds; left CT x2  GI:  distended, tympanic, ileostomy with liquid stool, non-tender; incision healing - no cellulitis  Musculoskeletal:  no synovitis  Neurologic: awake and alert, no focal findings  Skin:  no rash, no erythema, no phlebitis - R PICC c/d/i  Psychiatric:  awake, alert, appropriate mood                                 8.5    12.35 )-----------( 286      ( 2020 12:15 )             26.7     142  |  103  |  17  ----------------------------<  113  3.6   |  33  |  0.54  Ca    7.8      2020 00:12Phos  2.7     Mg     1.7       TPro  4.9  /  Alb  2.1  /  TBili  1.0  /  DBili  0.5  /  AST  15  /  ALT  15  /  AlkPhos  52      WBC Count: 12.35 (20 @ 12:15)  WBC Count: 12.93 (20 @ 00:12)  WBC Count: 15.05 (20 @ 12:47)  WBC Count: 25.73 (20 @ 00:14)    Urinalysis Basic - ( 2020 12:41 )  Color: brown / Appearance: Turbid / S.016 / pH: x  Gluc: x / Ketone: Negative  / Bili: Negative / Urobili: Negative   Blood: x / Protein: 30 mg/dL / Nitrite: Negative   Leuk Esterase: Negative / RBC: >50 /hpf / WBC 6 /HPF   Sq Epi: x / Non Sq Epi: 0 /hpf / Bacteria: Few    Cytopathology - Non Gyn Report (19 @ 18:26)    Specimen(s) Submitted  PLEURAL FLUID  Final Diagnosis  PLEURAL FLUID  NEGATIVE FOR MALIGNANT CELLS.    Surgical Pathology Report (12.10.19 @ 21:40)    Surgical Final Report  Final Diagnosis  Small bowel with ileocolic resection:  - Small bowel with diffuse ischemic enteritis, transmural active inflammation, abscess formation and focal necrosis  - Ischemic enteritis extending to proximal small bowel resection margin  - Distal colonic resection margin, viable    MICROBIOLOGY:    Culture - Bronchial (20 @ 06:43)    Gram Stain:   Moderate polymorphonuclear leukocytes per low power field  Few Squamous epithelial cells per low power field  Rare Gram positive cocci in pairs  Rare Gram Negative Rods    Specimen Source: Bronch Wash Combicath    Culture Results:   No growth to date.    Culture - Fungal, Blood (01.15.20 @ 10:09)    Specimen Source: .Blood Blood-Venous    Culture Results:   Testing in progress    Culture - Fungal, Blood (01.15.20 @ 10:09)    Specimen Source: .Blood Blood-Peripheral    Culture Results:   Testing in progress    HIV-1/2 Combo Result: Nonreact: (01.15.20 @ 09:03)    Aspergillus Galactomannan Antigen (01.15.20 @ 10:06)    Aspergillus Galactomannan Antigen: <0.500:     Fungitell (20 @ 11:42)    Fungitell: 241:     Culture - Fungal, Blood (collected 2020 18:15)  Source: .Blood Blood  Preliminary Report (2020 08:34):    Testing in progress    Culture - Blood (collected 2020 06:13)  Source: .Blood Blood-Peripheral  Preliminary Report (2020 07:02):    No growth to date.    Culture - Acid Fast (19 @ 03:58)    AFB Specimen Processing: Concentration    Acid-Fast Smear: Negative: Performed At: [address] [id number]  Paresh Roche MD Ph:[phone]    Culture - Blood (19 @ 22:09)    Specimen Source: .Blood Blood-Peripheral    Culture Results:   No growth at 5 days.    Culture - Fungal, Body Fluid (19 @ 19:16)    Specimen Source: .Body Fluid Pleural Fluid    Culture Results:   No growth    Culture - Body Fluid with Gram Stain (19 @ 19:16)    Gram Stain:   No polymorphonuclear cells seen  No organisms seen  by cytocentrifuge    Specimen Source: .Body Fluid Pleural Fluid    Culture Results:   No growth at 5 days    Culture - Blood (19 @ 22:20)    Specimen Source: .Blood Blood    Culture Results:   No growth at 5 days.    Culture - Blood (19 @ 22:20)    Specimen Source: .Blood Blood    Culture Results:   No growth at 5 days.    RADIOLOGY:  Xray Chest 1 View- PORTABLE-Routine (20 @ 07:07) >  IMPRESSION:  Interval removal of the endotracheal tube. Bibasilar opacities.  imaging above personally reviewed and agree with findings    CT Abdomen and Pelvis w/ IV Cont (01.15.20 @ 13:25) >  There is complete atelectasis of the left lower lobe and partial atelectasis of the left upper lobe. Partial compressive atelectasis of the right lower lobe. Emphysema. Nodular opacities in the right lower lobe, unchanged.  PLEURA: There is a left chest tubes. There is a large loculated left pleural effusion with areas of high attenuation, increased in size. Trace left pneumothorax. There is a small to moderate right pleural effusion. IMPRESSION:  Loculated large left pleural effusion with areas of hyperattenuation likely secondary to hematoma. Interval increase in size of the loculated left pleural effusion since 2019. Left sided chest tube terminating in the pleural space. Trace left pneumothorax.  Extensive left subcutaneous emphysema. Hematoma is also noted along the left lateral chest wall.  Interval resolution of the midline anterior abdominal wall fluid collection.  Markedly distended urinary bladder with mild bilateral hydronephrosis.  A small droplet of gas in the right anterior abdomen adjacent       Xray Chest 1 View- PORTABLE-Urgent (20 @ 04:11) >  Impression:  The heart is normal in size. Left pleural effusion. Left lower lobe pneumonia and/or atelectasis. Small right pleural effusion cannot be ruled out. A PICC line is seen on the right and the tip is in superior vena cava. No pneumothorax. Calcified aortic knob.    Xray Chest 1 View- PORTABLE-Routine (20 @ 09:47) >  IMPRESSION:  Interval placement of right-sided chest tube.  Bilateral lower lung patchy opacities and small bilateral pleural effusions likely representing pulmonary edema.     Xray Chest 1 View- PORTABLE-Routine (20 @ 07:00) >  IMPRESSION: Right PICC line is unchanged. Left pleural catheters are partially imaged. Small pleural effusions likely with associated areas of atelectasis. No clear evidence of a pneumothorax given left superimposed subcutaneous emphysema.    CT Abdomen and Pelvis w/ IV Cont (19 @ 06:05) >  IMPRESSION:   Small midline anterior abdominal wall fluid collection may represent postoperative seroma, hematoma, or less likely abscess.  Interval resolution of the left pneumothorax.  Bilateral pleural effusions, left greater than right, slightly increased in size compared to the prior study.  Unchanged right lower lobe tree-in-bud/nodular opacities with secretions in the bilateral lower lobe bronchi. Findings may represent pneumonia versus distal mucoid impacted airways.    CT Chest No Cont (19 @ 19:39) >  IMPRESSION:  Left-sided chest tube in place with trace left apical pneumothorax.  Patchy airspace opacities in the right lower lobe, differential favors distal mucoid impacted airways versus pneumonia.  Moderate left and small right pleural effusions with mild subsegmental atelectasis of the right lower lobe.    CT Abdomen and Pelvis No Cont (19 @ 16:48) >  IMPRESSION:   High-grade small bowel obstruction with transition point in the right lower quadrant.

## 2020-01-17 NOTE — PROGRESS NOTE ADULT - ASSESSMENT
76 y/o M presenting with septic shock and high grade SBO s/p exploratory laparotomy, lysis of adhesions, decompression of bowel via enterotomy w/ primary repair, and Abthera VAC placement on 12/6; s/p take down of Abthera, washout and re-application of the Abthera vac on 12/8. 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 s/p L chest tube placement by IR 12/30 for pleural effusion now s/p VATS, with chest tube insertion for drainage of pleural effusion. RRT called 1/13 AM for hypoxia, patient transferred back to SICU.  Patient continued SOB, chest drain possible obstruction expanding. Patent taken back to OR emergently 1/15 for clot evac and VATS.     PLAN:  - pain control   - f/u CTS recs  - Appreciate excellent SICU care    [facility]  [id number]

## 2020-01-17 NOTE — PROGRESS NOTE ADULT - ASSESSMENT
73 y/o M presenting with septic shock and high grade SBO s/p exploratory laparotomy, lysis of adhesions, decompression of bowel via enterotomy w/ primary repair, and Abthera VAC placement on 12/6; s/p take down of Abthera, washout and re-application of the Abthera vac on 12/8. Now s/p SBR and end ileostomy on 12/10 acute respiratory distress now improving, Pneumothorax, hyperglycemia, delirium. Now still with persistent pneumothorax when chest pigtail clamped.   12/23 VSS on room air recommending IR drainage of left chest  12/24 No evidence of air leak to left chest tube. Tube clamped. Repeat chest cxr in 4 hours. Anticipate chest tube removal if lung remains expanded. Drainage of loculated left effusion discussed with IR. IR to reconsult for drainage once initial tube removed. Discussed plan with Dr Mayes and IR Fellow  12/24  On NC ,    VSSchest xray sm lt pl eff, left chest tube dc'd.  12/26    2l NC   co  sob,  lt side diminshed  12/27 VSS, CXR with unchanged loculated left pleural effusion- IR consulted for pigtail placement, states effusion can be drained via thoracentesis, pigtail placement not indicated at this time - Dr. Mayes to speak with IR attending.   12/30 Patient with persistent PTX  and left pleural effusion now for IR drainage with Dr Elkins.   12/31 HD stable, L PTC w/ high output, serosanguinous drainage, 990 ml overnight/ 2200 in 24 hrs. Tolerating 2L NC supplemental O2, Cyto pending, continue monitor drainage output.   1/1: LPTC still with High output-400/24h. Continue with pigtail cath drain.  1/ 2     lt pigtail draining  on lws  1/3 HD stable, left pigtail catheter continues to drain. Incentive spirometer encouraged, OOB and mobility, continue pulmonary toileting. Maintain chest tube to suction and monitor output.   1/4: Chest tube milked to prevent clogging of tube, Continues to drain with high out put.  1/5 remains with high out put 170/700. Continue drainage  1/6  persistent drainage from lt pl tube  1/7 Left pigtail 150/450. Daily CXR. Planning for pleurex cath placement on Friday 1/10 w/ Dr. Mayes  1/8        Lt pleural tube   persistent draining    1/9 preop for pleurX placement tomorrow. CT clamped. NPO after MN, type and cross x2.   1/10 s/p VATS L pleurx cath placement   1/11 HD stable, no resp distress, maintain L pleurex to suction overnight, CXR in AM.   1/12 pleurex waterseal  1/13   RRT  called for respiratory distress   plx to pleurvac   back to lws   min drainage  no a/l  1/14 VSS, Left pleurx catheter minimal drainage 10ml/24h, CXR: small left pleural effusion with left pleurx catheter in place.     1/15 Overnight episode of bradycardia and respiratory distress that stabilized with BiPap support, concerned for anemia requiring 2 U PRBC and increase in left pleural effusion, minimal drainage in pleurx.  1/15 s/p bronch, Left VATS. Evacuation of 2L blood from L chest, pleurX removal, chest ubes x2  1/16 HD stable, minimal vasopressors, CXR stable, currently SBT, CT x2 remains to suction, draining.   1/17 HD stable, CXR stable, will water seal chest tubes today, repeat CXR in AM. Cont to monitor drainage.

## 2020-01-17 NOTE — PROGRESS NOTE ADULT - SUBJECTIVE AND OBJECTIVE BOX
[facility] NUTRITION SUPPORT / TPN -- FOLLOW UP NOTE  --------------------------------------------------------------------------------    24 hour events/subjective:          Diet:  Diet, Dysphagia 1 Pureed-Nectar Consistency Fluid:   Supplement Feeding Modality:  Oral  Ensure Pudding Cans or Servings Per Day:  2       Frequency:  Two Times a day (01-16-20 @ 18:02)      Appetite: [  ]Poor [ x ]Adequate [  ]Good  Caloric intake:  [   ]  Adequate   [ x  ] Inadequate    ROS: General/ GI see HPI  all other systems negative      ALLERGIES & MEDICATIONS  --------------------------------------------------------------------------------  ALLERGIES  IV Contrast (Hives)    STANDING INPATIENT MEDICATIONS    acetaminophen   Tablet .. 975 milliGRAM(s) Oral every 6 hours  albuterol/ipratropium for Nebulization. 3 milliLiter(s) Nebulizer every 6 hours  buDESOnide    Inhalation Suspension 0.5 milliGRAM(s) Inhalation every 12 hours  chlorhexidine 2% Cloths 1 Application(s) Topical <User Schedule>  diphenoxylate/atropine 2 Tablet(s) Oral <User Schedule>  enoxaparin Injectable 40 milliGRAM(s) SubCutaneous daily  insulin lispro (HumaLOG) corrective regimen sliding scale   SubCutaneous every 6 hours  loperamide 16 milliGRAM(s) Oral <User Schedule>  octreotide  Injectable 100 MICROGram(s) SubCutaneous three times a day  opium Tincture 6 milliGRAM(s) Oral <User Schedule>  pantoprazole    Tablet 40 milliGRAM(s) Oral before breakfast  piperacillin/tazobactam IVPB.. 3.375 Gram(s) IV Intermittent every 8 hours  sodium chloride 0.9%. 1000 milliLiter(s) IV Continuous <Continuous>  sodium chloride 0.9%. 1000 milliLiter(s) IV Continuous <Continuous>  vancomycin  IVPB 750 milliGRAM(s) IV Intermittent every 12 hours      PRN INPATIENT MEDICATION  ondansetron Injectable 4 milliGRAM(s) IV Push every 6 hours PRN        VITALS/PHYSICAL EXAM  --------------------------------------------------------------------------------  T(C): 36.8 (01-17-20 @ 07:00), Max: 37.6 (01-16-20 @ 15:00)  HR: 84 (01-17-20 @ 09:00) (67 - 98)  BP: 114/57 (01-17-20 @ 07:00) (114/57 - 116/56)  RR: 22 (01-17-20 @ 09:00) (15 - 35)  SpO2: 97% (01-17-20 @ 09:00) (92% - 100%)  Wt(kg): --  Height (cm): 175 (01-15-20 @ 15:12)  Weight (kg): 54 (01-15-20 @ 15:12)  BMI (kg/m2): 17.6 (01-15-20 @ 15:12)  BSA (m2): 1.66 (01-15-20 @ 15:12)      01-16-20 @ 07:01  -  01-17-20 @ 07:00  --------------------------------------------------------  IN: 5063.7 mL / OUT: 2735 mL / NET: 2328.7 mL    01-17-20 @ 07:01  -  01-17-20 @ 09:25  --------------------------------------------------------  IN: 420 mL / OUT: 60 mL / NET: 360 mL            LABS/ CULTURES/ RADIOLOGY:              7.4    12.93 >-----------<  217      [01-17-20 @ 00:12]              23.0     142  |  103  |  17  ----------------------------<  113      [01-17-20 @ 00:12]  3.6   |  33  |  0.54        Ca     7.8     [01-17-20 @ 00:12]      Mg     1.7     [01-17-20 @ 00:12]      Phos  2.7     [01-17-20 @ 00:12]    TPro  4.9  /  Alb  2.1  /  TBili  1.0  /  DBili  0.5  /  AST  15  /  ALT  15  /  AlkPhos  52  [01-17-20 @ 00:12]    PT/INR: PT 15.0 , INR 1.29       [01-17-20 @ 00:12]  PTT: 47.1       [01-17-20 @ 00:12]    CK 49      [01-15-20 @ 10:07]    Blood Gas Arterial - Calcium, Ionized: 1.14 mmoL/L (01-17-20 @ 00:07)  Blood Gas Arterial - Calcium, Ionized: 1.09 mmoL/L (01-16-20 @ 15:34)    Blood Gas Arterial, Lactate: 0.6 mmol/L (01.17.20 @ 00:07)  Blood Gas Arterial, Lactate: 0.8 mmol/L (01.16.20 @ 12:42)  Blood Gas Arterial, Lactate: 1.4 mmol/L (01.16.20 @ 04:07)    Blood Gas Profile - Arterial (01.17.20 @ 00:07)    pH, Arterial: 7.38    pCO2, Arterial: 61 mmHg    pO2, Arterial: 95 mmHg    HCO3, Arterial: 35 mmol/L    Base Excess, Arterial: 9.3 mmol/L    Oxygen Saturation, Arterial: 98 %    Total CO2, Arterial: 37 mmoL/L    FIO2, Arterial: 28    Blood Gas Source Arterial: Arterial      CAPILLARY BLOOD GLUCOSE  POCT Blood Glucose.: 91 mg/dL (17 Jan 2020 05:27)  POCT Blood Glucose.: 116 mg/dL (16 Jan 2020 18:56)  POCT Blood Glucose.: 113 mg/dL (16 Jan 2020 11:22)    Prealbumin, Serum: 15 mg/dL (01-15-20 @ 09:01)  Prealbumin, Serum: 13 mg/dL (01-14-20 @ 07:29)  Prealbumin, Serum: 13 mg/dL (01-07-20 @ 02:35)  Prealbumin, Serum: 14 mg/dL (01-06-20 @ 07:40)  Prealbumin, Serum: 16 mg/dL (01-01-20 @ 02:48)      Triglycerides, Serum: 51 mg/dL (01.15.20 @ 01:44)  Triglycerides, Serum: 60 mg/dL (01.14.20 @ 03:31)  Triglycerides, Serum: 50 mg/dL (01.07.20 @ 01:05)  Triglycerides, Serum: 48 mg/dL (01.06.20 @ 02:44)  Triglycerides, Serum: 56 mg/dL (01.01.20 @ 00:42)      Culture - Bronchial (01.16.20 @ 06:43)    Gram Stain:   Moderate polymorphonuclear leukocytes per low power field  Few Squamous epithelial cells per low power field  Rare Gram positive cocci in pairs  Rare Gram Negative Rods    Specimen Source: Bronch Wash Combicath    Culture Results:   No growth to date.    Culture - Blood (01.16.20 @ 02:02)    Specimen Source: .Blood Blood    Culture Results:   No growth to date.    Culture - Fungal, Blood (01.15.20 @ 10:09)    Specimen Source: .Blood Blood-Venous    Culture Results:   Testing in progress    < from: [facility] Duplex Upper Ext Vein Scan, Right (01.16.20 @ 12:41) >  FINDINGS:    The right internal jugular, subclavian, axillary and brachial veins are patent and compressible where applicable.  The basilic and cephalic veins (superficial veins) are patent and without thrombus.     Doppler examination shows normal spontaneous and phasic flow.    A PICC line is visualized within the right basilic/axillary/subclavian veins    Edema is appreciated.    IMPRESSION:     No evidence of right upper extremity deep venous thrombosis.    PICC line appreciated.      < from: Xray Chest 1 View- PORTABLE-Routine (01.16.20 @ 07:09) >    FINDINGS/  IMPRESSION:   The study is limited with partial collimation of both costophrenic angles.  ET tube, right PICC line and left chest tube remain in appropriate position.  The cardiomediastinal silhouette is within normal limits.  No significant bilateral pleural effusions.  Persistent bilateral lower lung airspace opacities.  No pneumothorax. [facility] NUTRITION SUPPORT / TPN -- FOLLOW UP NOTE  --------------------------------------------------------------------------------    24 hour events/subjective:  Pt s/p Left VATS. Evacuation of 2L blood from Lt chest w/ placement of CT x2 on 1/15/2020  Pt remains in SICU-   OOB to chair- pt's personal goal is to sit there until 1pm/ after lunch              reminded to use incentive spirometer  Pt successfully extubated - doing well on NC O2             resting comfortably now             SOB w/ dyspnea/ cough when moving OOB to chair         Ileostomy output- increased and more liquid            replacement with 0.5 cc per cc output with NS            continue lomotil, imodium, octreotide, tincture of opium  Continue to monitor off TPN  Leukocytosis, concern for PNA- added Vanco, only 3more days of Zosyn, recent cultures remain neg  No  n/v, ostomy w/ reduced output  Pt denies any pain/ cough/ palp/ sob/ dyspnea  No f/c/s          Diet:  Diet, Dysphagia 1 Pureed-Nectar Consistency Fluid:   Supplement Feeding Modality:  Oral  Ensure Pudding Cans or Servings Per Day:  2       Frequency:  Two Times a day (01-16-20 @ 18:02)      Appetite: [  ]Poor [ x ]Adequate [  ]Good  Caloric intake:  [   ]  Adequate   [ x  ] Inadequate    ROS: General/ GI see HPI  all other systems negative      ALLERGIES & MEDICATIONS  --------------------------------------------------------------------------------  ALLERGIES  IV Contrast (Hives)    STANDING INPATIENT MEDICATIONS    acetaminophen   Tablet .. 975 milliGRAM(s) Oral every 6 hours  albuterol/ipratropium for Nebulization. 3 milliLiter(s) Nebulizer every 6 hours  buDESOnide    Inhalation Suspension 0.5 milliGRAM(s) Inhalation every 12 hours  chlorhexidine 2% Cloths 1 Application(s) Topical <User Schedule>  diphenoxylate/atropine 2 Tablet(s) Oral <User Schedule>  enoxaparin Injectable 40 milliGRAM(s) SubCutaneous daily  insulin lispro (HumaLOG) corrective regimen sliding scale   SubCutaneous every 6 hours  loperamide 16 milliGRAM(s) Oral <User Schedule>  octreotide  Injectable 100 MICROGram(s) SubCutaneous three times a day  opium Tincture 6 milliGRAM(s) Oral <User Schedule>  pantoprazole    Tablet 40 milliGRAM(s) Oral before breakfast  piperacillin/tazobactam IVPB.. 3.375 Gram(s) IV Intermittent every 8 hours  sodium chloride 0.9%. 1000 milliLiter(s) IV Continuous <Continuous>  sodium chloride 0.9%. 1000 milliLiter(s) IV Continuous <Continuous>  vancomycin  IVPB 750 milliGRAM(s) IV Intermittent every 12 hours      PRN INPATIENT MEDICATION  ondansetron Injectable 4 milliGRAM(s) IV Push every 6 hours PRN        VITALS/PHYSICAL EXAM  --------------------------------------------------------------------------------  T(C): 36.8 (01-17-20 @ 07:00), Max: 37.6 (01-16-20 @ 15:00)  HR: 84 (01-17-20 @ 09:00) (67 - 98)  BP: 114/57 (01-17-20 @ 07:00) (114/57 - 116/56)  RR: 22 (01-17-20 @ 09:00) (15 - 35)  SpO2: 97% (01-17-20 @ 09:00) (92% - 100%)  Wt(kg): --  Height (cm): 175 (01-15-20 @ 15:12)  Weight (kg): 54 (01-15-20 @ 15:12)  BMI (kg/m2): 17.6 (01-15-20 @ 15:12)  BSA (m2): 1.66 (01-15-20 @ 15:12)      01-16-20 @ 07:01  -  01-17-20 @ 07:00  --------------------------------------------------------  IN: 5063.7 mL / OUT: 2735 mL / NET: 2328.7 mL    01-17-20 @ 07:01  -  01-17-20 @ 09:25  --------------------------------------------------------  IN: 420 mL / OUT: 60 mL / NET: 360 mL      PHYSICAL EXAM  --------------------------------------------------------------------------------  	Gen: guarded but stable, A&Ox3, NC O2  	HEENT: NC/AT, PERRL, supple neck, trachea midline, mucosa moist              Chest:  decreased BS Lt base; Lt chest w/ CT x2 to wall suction (apex w/ serosanguinous drainage,                       base w/more sanguinous than serous drainage)  	GI: (+) BS, softly distended, non tender                   midline incision c/d/i w/o drainage                   (+)ostomy pink viable- bilious drainage              MSK: FROM x4, no contractures nor deformities  	Vascular: Equally Warm, (+)BLE edema,  no clubbing, cyanosis,                        RUE w/ reduced swelling no cord, mass, erythema, drainage, nor tenderness                       No LUE swelling -                             BUE w/o clubbing & cyanosis                        Rt PICC dressing c/d/I w/o s/sx infection   	Neuro: No focal deficits, intact sensation, weakened strength  	Psych: Normal affect and mood      LABS/ CULTURES/ RADIOLOGY:              7.4    12.93 >-----------<  217      [01-17-20 @ 00:12]              23.0     142  |  103  |  17  ----------------------------<  113      [01-17-20 @ 00:12]  3.6   |  33  |  0.54        Ca     7.8     [01-17-20 @ 00:12]      Mg     1.7     [01-17-20 @ 00:12]      Phos  2.7     [01-17-20 @ 00:12]    TPro  4.9  /  Alb  2.1  /  TBili  1.0  /  DBili  0.5  /  AST  15  /  ALT  15  /  AlkPhos  52  [01-17-20 @ 00:12]    PT/INR: PT 15.0 , INR 1.29       [01-17-20 @ 00:12]  PTT: 47.1       [01-17-20 @ 00:12]    CK 49      [01-15-20 @ 10:07]    Blood Gas Arterial - Calcium, Ionized: 1.14 mmoL/L (01-17-20 @ 00:07)  Blood Gas Arterial - Calcium, Ionized: 1.09 mmoL/L (01-16-20 @ 15:34)    Blood Gas Arterial, Lactate: 0.6 mmol/L (01.17.20 @ 00:07)  Blood Gas Arterial, Lactate: 0.8 mmol/L (01.16.20 @ 12:42)  Blood Gas Arterial, Lactate: 1.4 mmol/L (01.16.20 @ 04:07)    Blood Gas Profile - Arterial (01.17.20 @ 00:07)    pH, Arterial: 7.38    pCO2, Arterial: 61 mmHg    pO2, Arterial: 95 mmHg    HCO3, Arterial: 35 mmol/L    Base Excess, Arterial: 9.3 mmol/L    Oxygen Saturation, Arterial: 98 %    Total CO2, Arterial: 37 mmoL/L    FIO2, Arterial: 28    Blood Gas Source Arterial: Arterial      CAPILLARY BLOOD GLUCOSE  POCT Blood Glucose.: 91 mg/dL (17 Jan 2020 05:27)  POCT Blood Glucose.: 116 mg/dL (16 Jan 2020 18:56)  POCT Blood Glucose.: 113 mg/dL (16 Jan 2020 11:22)    Prealbumin, Serum: 15 mg/dL (01-15-20 @ 09:01)  Prealbumin, Serum: 13 mg/dL (01-14-20 @ 07:29)  Prealbumin, Serum: 13 mg/dL (01-07-20 @ 02:35)  Prealbumin, Serum: 14 mg/dL (01-06-20 @ 07:40)  Prealbumin, Serum: 16 mg/dL (01-01-20 @ 02:48)      Triglycerides, Serum: 51 mg/dL (01.15.20 @ 01:44)  Triglycerides, Serum: 60 mg/dL (01.14.20 @ 03:31)  Triglycerides, Serum: 50 mg/dL (01.07.20 @ 01:05)  Triglycerides, Serum: 48 mg/dL (01.06.20 @ 02:44)  Triglycerides, Serum: 56 mg/dL (01.01.20 @ 00:42)      Culture - Bronchial (01.16.20 @ 06:43)    Gram Stain:   Moderate polymorphonuclear leukocytes per low power field  Few Squamous epithelial cells per low power field  Rare Gram positive cocci in pairs  Rare Gram Negative Rods    Specimen Source: Bronch Wash Combicath    Culture Results:   No growth to date.    Culture - Blood (01.16.20 @ 02:02)    Specimen Source: .Blood Blood    Culture Results:   No growth to date.    Culture - Fungal, Blood (01.15.20 @ 10:09)    Specimen Source: .Blood Blood-Venous    Culture Results:   Testing in progress    < from: [facility] Duplex Upper Ext Vein Scan, Right (01.16.20 @ 12:41) >  FINDINGS:    The right internal jugular, subclavian, axillary and brachial veins are patent and compressible where applicable.  The basilic and cephalic veins (superficial veins) are patent and without thrombus.     Doppler examination shows normal spontaneous and phasic flow.    A PICC line is visualized within the right basilic/axillary/subclavian veins    Edema is appreciated.    IMPRESSION:     No evidence of right upper extremity deep venous thrombosis.    PICC line appreciated.      < from: Xray Chest 1 View- PORTABLE-Routine (01.16.20 @ 07:09) >    FINDINGS/  IMPRESSION:   The study is limited with partial collimation of both costophrenic angles.  ET tube, right PICC line and left chest tube remain in appropriate position.  The cardiomediastinal silhouette is within normal limits.  No significant bilateral pleural effusions.  Persistent bilateral lower lung airspace opacities.  No pneumothorax. [facility] NUTRITION SUPPORT / TPN -- FOLLOW UP NOTE  --------------------------------------------------------------------------------    24 hour events/subjective:  Pt s/p Left VATS. Evacuation of 2L blood from Lt chest w/ placement of CT x2 on 1/15/2020  Pt remains in SICU-   OOB to chair- pt's personal goal is to sit there until 1pm/ after lunch              reminded to use incentive spirometer  Pt successfully extubated - doing well on NC O2             resting comfortably now             SOB w/ dyspnea/ cough when moving OOB to chair         Ileostomy output- increased and more liquid            replacement with 0.5 cc per cc output with NS            continue lomotil, imodium, octreotide, tincture of opium  Continue to monitor off TPN  Leukocytosis, concern for PNA- added Vanco, only 3more days of Zosyn, recent cultures remain neg  No  n/v, ostomy w/ reduced output  Pt denies any abdominal pain or urinary complaints  No f/c/s      Diet:  Diet, Dysphagia 1 Pureed-Nectar Consistency Fluid:   Supplement Feeding Modality:  Oral  Ensure Pudding Cans or Servings Per Day:  2       Frequency:  Two Times a day (01-16-20 @ 18:02)      Appetite: [  ]Poor [ x ]Adequate [  ]Good  Caloric intake:  [   ]  Adequate   [ x  ] Inadequate    ROS: General/ GI see HPI  all other systems negative      ALLERGIES & MEDICATIONS  --------------------------------------------------------------------------------  ALLERGIES  IV Contrast (Hives)    STANDING INPATIENT MEDICATIONS    acetaminophen   Tablet .. 975 milliGRAM(s) Oral every 6 hours  albuterol/ipratropium for Nebulization. 3 milliLiter(s) Nebulizer every 6 hours  buDESOnide    Inhalation Suspension 0.5 milliGRAM(s) Inhalation every 12 hours  chlorhexidine 2% Cloths 1 Application(s) Topical <User Schedule>  diphenoxylate/atropine 2 Tablet(s) Oral <User Schedule>  enoxaparin Injectable 40 milliGRAM(s) SubCutaneous daily  insulin lispro (HumaLOG) corrective regimen sliding scale   SubCutaneous every 6 hours  loperamide 16 milliGRAM(s) Oral <User Schedule>  octreotide  Injectable 100 MICROGram(s) SubCutaneous three times a day  opium Tincture 6 milliGRAM(s) Oral <User Schedule>  pantoprazole    Tablet 40 milliGRAM(s) Oral before breakfast  piperacillin/tazobactam IVPB.. 3.375 Gram(s) IV Intermittent every 8 hours  sodium chloride 0.9%. 1000 milliLiter(s) IV Continuous <Continuous>  sodium chloride 0.9%. 1000 milliLiter(s) IV Continuous <Continuous>  vancomycin  IVPB 750 milliGRAM(s) IV Intermittent every 12 hours      PRN INPATIENT MEDICATION  ondansetron Injectable 4 milliGRAM(s) IV Push every 6 hours PRN        VITALS/PHYSICAL EXAM  --------------------------------------------------------------------------------  T(C): 36.8 (01-17-20 @ 07:00), Max: 37.6 (01-16-20 @ 15:00)  HR: 84 (01-17-20 @ 09:00) (67 - 98)  BP: 114/57 (01-17-20 @ 07:00) (114/57 - 116/56)  RR: 22 (01-17-20 @ 09:00) (15 - 35)  SpO2: 97% (01-17-20 @ 09:00) (92% - 100%)  Wt(kg): --  Height (cm): 175 (01-15-20 @ 15:12)  Weight (kg): 54 (01-15-20 @ 15:12)  BMI (kg/m2): 17.6 (01-15-20 @ 15:12)  BSA (m2): 1.66 (01-15-20 @ 15:12)      01-16-20 @ 07:01  -  01-17-20 @ 07:00  --------------------------------------------------------  IN: 5063.7 mL / OUT: 2735 mL / NET: 2328.7 mL    01-17-20 @ 07:01  -  01-17-20 @ 09:25  --------------------------------------------------------  IN: 420 mL / OUT: 60 mL / NET: 360 mL      PHYSICAL EXAM  --------------------------------------------------------------------------------  	Gen: guarded but stable, A&Ox3, NC O2  	HEENT: NC/AT, PERRL, supple neck, trachea midline, mucosa moist              Chest:  decreased BS Lt base; Lt chest w/ CT x2 to wall suction (apex w/ serosanguinous drainage,                       base w/more sanguinous than serous drainage)  	GI: (+) BS, softly distended, non tender                   midline incision c/d/i w/o drainage                   (+)ostomy pink viable- bilious drainage              MSK: FROM x4, no contractures nor deformities  	Vascular: Equally Warm, (+)BLE edema,  no clubbing, cyanosis,                        RUE w/ reduced swelling no cord, mass, erythema, drainage, nor tenderness                       No LUE swelling -                             BUE w/o clubbing & cyanosis                        Rt PICC dressing c/d/I w/o s/sx infection   	Neuro: No focal deficits, intact sensation, weakened strength  	Psych: Normal affect and mood      LABS/ CULTURES/ RADIOLOGY:              7.4    12.93 >-----------<  217      [01-17-20 @ 00:12]              23.0     142  |  103  |  17  ----------------------------<  113      [01-17-20 @ 00:12]  3.6   |  33  |  0.54        Ca     7.8     [01-17-20 @ 00:12]      Mg     1.7     [01-17-20 @ 00:12]      Phos  2.7     [01-17-20 @ 00:12]    TPro  4.9  /  Alb  2.1  /  TBili  1.0  /  DBili  0.5  /  AST  15  /  ALT  15  /  AlkPhos  52  [01-17-20 @ 00:12]    PT/INR: PT 15.0 , INR 1.29       [01-17-20 @ 00:12]  PTT: 47.1       [01-17-20 @ 00:12]    CK 49      [01-15-20 @ 10:07]    Blood Gas Arterial - Calcium, Ionized: 1.14 mmoL/L (01-17-20 @ 00:07)  Blood Gas Arterial - Calcium, Ionized: 1.09 mmoL/L (01-16-20 @ 15:34)    Blood Gas Arterial, Lactate: 0.6 mmol/L (01.17.20 @ 00:07)  Blood Gas Arterial, Lactate: 0.8 mmol/L (01.16.20 @ 12:42)  Blood Gas Arterial, Lactate: 1.4 mmol/L (01.16.20 @ 04:07)    Blood Gas Profile - Arterial (01.17.20 @ 00:07)    pH, Arterial: 7.38    pCO2, Arterial: 61 mmHg    pO2, Arterial: 95 mmHg    HCO3, Arterial: 35 mmol/L    Base Excess, Arterial: 9.3 mmol/L    Oxygen Saturation, Arterial: 98 %    Total CO2, Arterial: 37 mmoL/L    FIO2, Arterial: 28    Blood Gas Source Arterial: Arterial      CAPILLARY BLOOD GLUCOSE  POCT Blood Glucose.: 91 mg/dL (17 Jan 2020 05:27)  POCT Blood Glucose.: 116 mg/dL (16 Jan 2020 18:56)  POCT Blood Glucose.: 113 mg/dL (16 Jan 2020 11:22)    Prealbumin, Serum: 15 mg/dL (01-15-20 @ 09:01)  Prealbumin, Serum: 13 mg/dL (01-14-20 @ 07:29)  Prealbumin, Serum: 13 mg/dL (01-07-20 @ 02:35)  Prealbumin, Serum: 14 mg/dL (01-06-20 @ 07:40)  Prealbumin, Serum: 16 mg/dL (01-01-20 @ 02:48)      Triglycerides, Serum: 51 mg/dL (01.15.20 @ 01:44)  Triglycerides, Serum: 60 mg/dL (01.14.20 @ 03:31)  Triglycerides, Serum: 50 mg/dL (01.07.20 @ 01:05)  Triglycerides, Serum: 48 mg/dL (01.06.20 @ 02:44)  Triglycerides, Serum: 56 mg/dL (01.01.20 @ 00:42)      Culture - Bronchial (01.16.20 @ 06:43)    Gram Stain:   Moderate polymorphonuclear leukocytes per low power field  Few Squamous epithelial cells per low power field  Rare Gram positive cocci in pairs  Rare Gram Negative Rods    Specimen Source: Bronch Wash Combicath    Culture Results:   No growth to date.    Culture - Blood (01.16.20 @ 02:02)    Specimen Source: .Blood Blood    Culture Results:   No growth to date.    Culture - Fungal, Blood (01.15.20 @ 10:09)    Specimen Source: .Blood Blood-Venous    Culture Results:   Testing in progress    < from: [facility] Duplex Upper Ext Vein Scan, Right (01.16.20 @ 12:41) >  FINDINGS:    The right internal jugular, subclavian, axillary and brachial veins are patent and compressible where applicable.  The basilic and cephalic veins (superficial veins) are patent and without thrombus.     Doppler examination shows normal spontaneous and phasic flow.    A PICC line is visualized within the right basilic/axillary/subclavian veins    Edema is appreciated.    IMPRESSION:     No evidence of right upper extremity deep venous thrombosis.    PICC line appreciated.      < from: Xray Chest 1 View- PORTABLE-Routine (01.16.20 @ 07:09) >    FINDINGS/  IMPRESSION:   The study is limited with partial collimation of both costophrenic angles.  ET tube, right PICC line and left chest tube remain in appropriate position.  The cardiomediastinal silhouette is within normal limits.  No significant bilateral pleural effusions.  Persistent bilateral lower lung airspace opacities.  No pneumothorax.

## 2020-01-17 NOTE — PROGRESS NOTE ADULT - SUBJECTIVE AND OBJECTIVE BOX
Surgery Post-Operative Note    SUBJECTIVE/24hr EVENTS:  - Extubated   - Levophed weaned off  - Vancomycin added for gram positive cocci in BCx.   - Patient seen and examined at bedside   - Patient states feeling ok  - Denies chest pain, SOB, N/V  --------------------------------------------------------------------------------------------------  OBJECTIVE:   Physical Exam:  General: AAOx3, NAD, lying comfortably in bed  HEENT: NC/AT  Respiratory: nonlabored breathing  Cardiovascular: RRR, normal S1 and S2, no murmurs or gallops  Abdomen: non-distended, soft, non-tender  Extremities: WWP  Drain: left chest tubes x2 with no air leaks  --------------------------------------------------------------------------------------------------  V/S:  Vital Signs Last 24 Hrs  T(C): 36.8 (17 Jan 2020 07:00), Max: 37.6 (16 Jan 2020 15:00)  T(F): 98.2 (17 Jan 2020 07:00), Max: 99.7 (16 Jan 2020 15:00)  HR: 84 (17 Jan 2020 09:00) (67 - 98)  BP: 114/57 (17 Jan 2020 07:00) (114/57 - 116/56)  BP(mean): 82 (17 Jan 2020 07:00) (81 - 82)  RR: 22 (17 Jan 2020 09:00) (15 - 35)  SpO2: 97% (17 Jan 2020 09:00) (92% - 100%)  Mode: 40% Aerosol    --------------------------------------------------------------------------------------------------  I/Os:    16 Jan 2020 07:01  -  17 Jan 2020 07:00  --------------------------------------------------------  IN:    fentaNYL  Infusion: 1.4 mL    norepinephrine Infusion: 57.9 mL    Oral Fluid: 850 mL    propofol Infusion: 19.4 mL    sodium chloride 0.9%.: 1800 mL    Solution: 900 mL    Solution: 835 mL    Solution: 600 mL  Total IN: 5063.7 mL    OUT:    Chest Tube: 100 mL    Chest Tube: 40 mL    Ileostomy: 1670 mL    Indwelling Catheter - Urethral: 925 mL  Total OUT: 2735 mL    Total NET: 2328.7 mL      17 Jan 2020 07:01  -  17 Jan 2020 09:43  --------------------------------------------------------  IN:    Oral Fluid: 120 mL    sodium chloride 0.9%.: 150 mL    Solution: 125 mL    Solution: 25 mL  Total IN: 420 mL    OUT:    Indwelling Catheter - Urethral: 60 mL  Total OUT: 60 mL    Total NET: 360 mL        --------------------------------------------------------------------------------------------------  LABS:                        7.4    12.93 )-----------( 217      ( 17 Jan 2020 00:12 )             23.0     17 Jan 2020 00:12    142    |  103    |  17     ----------------------------<  113    3.6     |  33     |  0.54     Ca    7.8        17 Jan 2020 00:12  Phos  2.7       17 Jan 2020 00:12  Mg     1.7       17 Jan 2020 00:12    TPro  4.9    /  Alb  2.1    /  TBili  1.0    /  DBili  0.5    /  AST  15     /  ALT  15     /  AlkPhos  52     17 Jan 2020 00:12    PT/INR - ( 17 Jan 2020 00:12 )   PT: 15.0 sec;   INR: 1.29 ratio         PTT - ( 17 Jan 2020 00:12 )  PTT:47.1 sec  CAPILLARY BLOOD GLUCOSE      POCT Blood Glucose.: 91 mg/dL (17 Jan 2020 05:27)  POCT Blood Glucose.: 116 mg/dL (16 Jan 2020 18:56)  POCT Blood Glucose.: 113 mg/dL (16 Jan 2020 11:22)    CARDIAC MARKERS ( 15 Isma 2020 10:07 )  x     / x     / 49 U/L / x     / 7.8 ng/mL      LIVER FUNCTIONS - ( 17 Jan 2020 00:12 )  Alb: 2.1 g/dL / Pro: 4.9 g/dL / ALK PHOS: 52 U/L / ALT: 15 U/L / AST: 15 U/L / GGT: x             Culture - Bronchial (collected 16 Jan 2020 06:43)  Source: Bronch Wash Combicath  Gram Stain (16 Jan 2020 13:56):    Moderate polymorphonuclear leukocytes per low power field    Few Squamous epithelial cells per low power field    Rare Gram positive cocci in pairs    Rare Gram Negative Rods  Preliminary Report (17 Jan 2020 08:55):    No growth to date.    Culture - Blood (collected 16 Jan 2020 02:02)  Source: .Blood Blood  Preliminary Report (17 Jan 2020 03:01):    No growth to date.    Culture - Blood (collected 15 Isma 2020 18:04)  Source: .Blood Blood-Arterial  Preliminary Report (16 Jan 2020 19:02):    No growth to date.    Culture - Fungal, Blood (collected 15 Isma 2020 10:09)  Source: .Blood Blood-Venous  Preliminary Report (16 Jan 2020 06:52):    Testing in progress    Culture - Fungal, Blood (collected 15 Isma 2020 10:09)  Source: .Blood Blood-Peripheral  Preliminary Report (16 Jan 2020 06:52):    Testing in progress        --------------------------------------------------------------------------------------------------  MEDICATIONS  (STANDING):  acetaminophen   Tablet .. 975 milliGRAM(s) Oral every 6 hours  albuterol/ipratropium for Nebulization. 3 milliLiter(s) Nebulizer every 6 hours  buDESOnide    Inhalation Suspension 0.5 milliGRAM(s) Inhalation every 12 hours  chlorhexidine 2% Cloths 1 Application(s) Topical <User Schedule>  diphenoxylate/atropine 2 Tablet(s) Oral <User Schedule>  enoxaparin Injectable 40 milliGRAM(s) SubCutaneous daily  insulin lispro (HumaLOG) corrective regimen sliding scale   SubCutaneous every 6 hours  loperamide 16 milliGRAM(s) Oral <User Schedule>  octreotide  Injectable 100 MICROGram(s) SubCutaneous three times a day  opium Tincture 6 milliGRAM(s) Oral <User Schedule>  pantoprazole    Tablet 40 milliGRAM(s) Oral before breakfast  piperacillin/tazobactam IVPB.. 3.375 Gram(s) IV Intermittent every 8 hours  sodium chloride 0.9%. 1000 milliLiter(s) (10 mL/Hr) IV Continuous <Continuous>  sodium chloride 0.9%. 1000 milliLiter(s) (75 mL/Hr) IV Continuous <Continuous>  vancomycin  IVPB 750 milliGRAM(s) IV Intermittent every 12 hours    MEDICATIONS  (PRN):  ondansetron Injectable 4 milliGRAM(s) IV Push every 6 hours PRN Nausea and/or Vomiting    --------------------------------------------------------------------------------------------------

## 2020-01-17 NOTE — PROGRESS NOTE ADULT - ATTENDING COMMENTS
Patient seen and examined today  s/p vats with 2.5 liters of hematoma evacuated pOD #2    Acute respiratory insufficiency - extubated yesterday, improved respiratory status but continues to necessitate 4 LO2 NC supplementation   Continue oxygen supplementation with goal SpO2> 92%  CXR with increased haziness on the left; possible pulm edema   CT remain to drain, now serosanguinous drainage  - will give lasix 20 mg for IV diuresis    leukocytosis- improving  -continue antiiotics as concern for pNA; will continue for 7 days     Hyperglycemia - continue ISS  -goal BG < 180    Ileostomy - 1600 cc   -will follow     Will continue to monitor respiratory status and CT output closely

## 2020-01-17 NOTE — PROGRESS NOTE ADULT - SUBJECTIVE AND OBJECTIVE BOX
Cardiovascular Disease Progress Note    Overnight events: No acute events overnight. Mr. Hernandez is sitting in the chair. He denies chest pain or SOB.   Otherwise review of systems negative    Objective Findings:  T(C): 37.5 (01-17-20 @ 03:00), Max: 37.6 (01-16-20 @ 15:00)  HR: 80 (01-17-20 @ 06:06) (64 - 98)  BP: 116/56 (01-16-20 @ 20:00) (116/56 - 116/56)  RR: 18 (01-17-20 @ 06:00) (15 - 35)  SpO2: 99% (01-17-20 @ 06:06) (92% - 100%)  Wt(kg): --  Daily     Daily       Physical Exam:  Gen: NAD; Patient resting comfortably  HEENT: EOMI, Normocephalic/ atraumatic  CV: RRR, normal S1 + S2, no m/r/g  Lungs:  Normal respiratory effort; clear to auscultation bilaterally  Abd: soft, non-tender; bowel sounds present  Ext: No edema; warm and well perfused    Telemetry: Sinus    Laboratory Data:                        7.4    12.93 )-----------( 217      ( 17 Jan 2020 00:12 )             23.0     01-17    142  |  103  |  17  ----------------------------<  113<H>  3.6   |  33<H>  |  0.54    Ca    7.8<L>      17 Jan 2020 00:12  Phos  2.7     01-17  Mg     1.7     01-17    TPro  4.9<L>  /  Alb  2.1<L>  /  TBili  1.0  /  DBili  0.5<H>  /  AST  15  /  ALT  15  /  AlkPhos  52  01-17    PT/INR - ( 17 Jan 2020 00:12 )   PT: 15.0 sec;   INR: 1.29 ratio         PTT - ( 17 Jan 2020 00:12 )  PTT:47.1 sec  CARDIAC MARKERS ( 15 Isma 2020 10:07 )  x     / x     / 49 U/L / x     / 7.8 ng/mL          Inpatient Medications:  MEDICATIONS  (STANDING):  acetaminophen   Tablet .. 975 milliGRAM(s) Oral every 6 hours  albuterol/ipratropium for Nebulization. 3 milliLiter(s) Nebulizer every 6 hours  buDESOnide    Inhalation Suspension 0.5 milliGRAM(s) Inhalation every 12 hours  chlorhexidine 2% Cloths 1 Application(s) Topical <User Schedule>  diphenoxylate/atropine 2 Tablet(s) Oral <User Schedule>  enoxaparin Injectable 40 milliGRAM(s) SubCutaneous daily  insulin lispro (HumaLOG) corrective regimen sliding scale   SubCutaneous every 6 hours  loperamide 16 milliGRAM(s) Oral <User Schedule>  norepinephrine Infusion 0.5 MICROgram(s)/kG/Min (50.625 mL/Hr) IV Continuous <Continuous>  octreotide  Injectable 100 MICROGram(s) SubCutaneous three times a day  opium Tincture 6 milliGRAM(s) Oral <User Schedule>  pantoprazole    Tablet 40 milliGRAM(s) Oral before breakfast  piperacillin/tazobactam IVPB.. 3.375 Gram(s) IV Intermittent every 8 hours  sodium chloride 0.9%. 1000 milliLiter(s) (10 mL/Hr) IV Continuous <Continuous>  sodium chloride 0.9%. 1000 milliLiter(s) (75 mL/Hr) IV Continuous <Continuous>  vancomycin  IVPB 750 milliGRAM(s) IV Intermittent every 12 hours      Assessment: 74M  s/p ex lap, MIRYAM, closure of enterotomy, abthera vac placement     Problem/Plan - 1:  ·  Problem: Small bowel obstruction.  Plan: s/p ex lap, MIRYAM, closure of enterotomy, abthera vac placement  s/p washout  s/p PICC line placement . On TPN.      Problem/Plan - 2:  ·  Problem: Bradycardia.    Plan: No further episodes of junctional rhythm in the last 24 hours after review of telemetry strips.   Continue off AV thomas blockers. Monitor on telemetry.     Problem/Plan - 3:  ·  Problem: Pneumothorax.  Plan: stable  s/p VATS on 1/15.       Rest of care as per SICU.       Over 25 minutes spent on total encounter; more than 50% of the visit was spent counseling and/or coordinating care by the attending physician.      Juan Carlos Mascorro MD [facility]  Cardiovascular Disease  [phone]

## 2020-01-17 NOTE — PROGRESS NOTE ADULT - ASSESSMENT
73 y/o male presenting with high grade SBO s/p exploratory laparotomy, lysis of adhesions, decompression of bowel via enterotomy w/ primary repair, & Abthera VAC placement (12/06/2019); RTOR for re-exploration w/ Abthera VAC replacement (12/08/2019) to allow for demarcation of ischemic small bowel; RTOR for re-exploration, small bowel resection of 150 cm (150 cm remaining), ileocecetomy, end ileostomy, mucous fistula, and abdominal closure (12/10/2019); hospital course complicated by SVT, short bowel syndrome requiring repletions w/ IV fluids, malnutrition requiring TPN, intermittent episodes of hypotension, spontaneous left pneumothorax s/p pigtail catheter (12/15/2019-12/24/2019), left pleural effusion s/p pigtail catheter w/ IR (12/30/2019), dysphagia, and oral HSV lesions, placement of Pleurx on 1/10, now re-admitted to SICU with acute hypoxic respiratory failure.     Neuro: no acute issues  - Pain control as needed with acetaminophen    Resp: COPD, spontaneous left pneumothorax s/p pigtail catheter, left pleural effusion s/p Pleurx 1/10.  Intubated for respiratory distress s/p VATS 01/15 with 2 Liter of blood evacuation   - Cont chest tubes to suction   - Pulmicort and Duoneb for COPD  - Will need outpatient follow-up with a pulmonologist for his COPD as patient does not currently have one    CV: Bradycardia, SVT.   - Monitor vital signs    GI: s/p exploratory laparotomy, Grecia, decompression of bowel via enterotomy w/ primary repair, & Abthera VAC placement (12/06/2019); re-exploration w/ Abthera VAC replacement (12/08/2019); re-exploration, small bowel resection of 150 cm (150 cm remaining), ileocecetomy, end ileostomy, mucous fistula, and abdominal closure (12/10/2019); short bowel syndrome, malnutrition, dysphagia  - Dsyphagia diet   - Continue Lomotil, tincture of opium, loperamide, and octreotide for short bowel syndrome  - Protonix to decrease gastric secretions  - Continue 0.5/1 ostomy repletions    Renal: no acute issues  - Monitor I&Os  - Monitor electrolytes and replete as necessary    Heme: anemia likely secondary to malnutrition / malabsorption  - Monitor CBC and coags  - Q12 CBCs   - Follow up anemia labs  - Lovenox for VTE prophylaxis    ID: oral HSV lesions (s/p acyclovir 12/23/2019-01/02/2020)  - Fungitell elevated: HIV testing, LDH pending.     Endo: no acute issues  - Monitor glucose before meals & at bedtime while on TPN    Disposition:  - Full code  - Will remain in SICU    - Buster Bishop PA-C

## 2020-01-17 NOTE — PROGRESS NOTE ADULT - ASSESSMENT
75M active smoker with COPD (not on home O2), active EtOH abuse admitted 12/6 with SBO.  Hospital course complicated by CHI, delirium, short gut syndrome, pneumothorax, episodes of hypotension, hypoxia, rash, anemia and transfusion requirements.      12/6/19 - OR for ex-lap, SANDRA and decompression enterotomy with primary repair - dusky area of bowel noted  12/8/19 - RTOR for washout, areas of ischemia of concern  12/10/19 - RTOR for partial colectomy, mucous fistula, end ileostomy  12/11/19 - extubated  12/15/19 - pneumothorax s/p L CT  12/30/19 - CT by IR  1/10/2020 - VATS with pleurex catheter insertion for drainage of pleural effusion.  1/10/2020 - rash - derm - miliaria rubra due to sweat trapping on the back  1/13/20/20 - RRT - hypoxia, hypotension, leukocytosis, no fever, drop in H/H  1/15/2020 - RTOR for VATS to evacuate hematoma    After RRT, cultures sent, zosyn started, CXR with increased LLL opacity.  Given PRBC.  Elevated fungitell noted.  Significance is not clear.  Galactomannen is negative.  On TPN, fungal blood cultures are negative.      Overall, leukocytosis, elevated fungitell, pneumonia    Fungitell  - f/u fungal blood cultures  - hold on antifungals    Leukocytosis  - in setting of underlying pneumonia and post-op  - zosyn okay for now - would like to limit to 7 days (today is D#5)  - f/u all cultures  - trend cbc  - vancomycin D#2 short course as there is no hx mrsa    Pneumonia  - f/u sputum cultures  - for now remains on vancomycin and zosyn    I have discussed plan of care as detailed above with sicu att    Please call the ID service [phone] with questions or concerns over the weekend

## 2020-01-18 LAB
24R-OH-CALCIDIOL SERPL-MCNC: 17 NG/ML — LOW (ref 30–80)
ALBUMIN SERPL ELPH-MCNC: 2 G/DL — LOW (ref 3.3–5)
ALP SERPL-CCNC: 52 U/L — SIGNIFICANT CHANGE UP (ref 40–120)
ALT FLD-CCNC: 13 U/L — SIGNIFICANT CHANGE UP (ref 10–45)
ANION GAP SERPL CALC-SCNC: 10 MMOL/L — SIGNIFICANT CHANGE UP (ref 5–17)
APTT BLD: 44.4 SEC — HIGH (ref 27.5–36.3)
AST SERPL-CCNC: 13 U/L — SIGNIFICANT CHANGE UP (ref 10–40)
BILIRUB DIRECT SERPL-MCNC: 0.4 MG/DL — HIGH (ref 0–0.2)
BILIRUB INDIRECT FLD-MCNC: 0.3 MG/DL — SIGNIFICANT CHANGE UP (ref 0.2–1)
BILIRUB SERPL-MCNC: 0.7 MG/DL — SIGNIFICANT CHANGE UP (ref 0.2–1.2)
BUN SERPL-MCNC: 12 MG/DL — SIGNIFICANT CHANGE UP (ref 7–23)
CALCIUM SERPL-MCNC: 7 MG/DL — LOW (ref 8.4–10.5)
CALCIUM SERPL-MCNC: 7.1 MG/DL — LOW (ref 8.4–10.5)
CHLORIDE SERPL-SCNC: 101 MMOL/L — SIGNIFICANT CHANGE UP (ref 96–108)
CO2 SERPL-SCNC: 30 MMOL/L — SIGNIFICANT CHANGE UP (ref 22–31)
CREAT SERPL-MCNC: 0.49 MG/DL — LOW (ref 0.5–1.3)
CULTURE RESULTS: SIGNIFICANT CHANGE UP
CULTURE RESULTS: SIGNIFICANT CHANGE UP
GLUCOSE BLDC GLUCOMTR-MCNC: 122 MG/DL — HIGH (ref 70–99)
GLUCOSE BLDC GLUCOMTR-MCNC: 132 MG/DL — HIGH (ref 70–99)
GLUCOSE BLDC GLUCOMTR-MCNC: 164 MG/DL — HIGH (ref 70–99)
GLUCOSE BLDC GLUCOMTR-MCNC: 93 MG/DL — SIGNIFICANT CHANGE UP (ref 70–99)
GLUCOSE SERPL-MCNC: 112 MG/DL — HIGH (ref 70–99)
HCT VFR BLD CALC: 23.5 % — LOW (ref 39–50)
HCT VFR BLD CALC: 25 % — LOW (ref 39–50)
HGB BLD-MCNC: 7.4 G/DL — LOW (ref 13–17)
HGB BLD-MCNC: 7.6 G/DL — LOW (ref 13–17)
INR BLD: 1.28 RATIO — HIGH (ref 0.88–1.16)
MAGNESIUM SERPL-MCNC: 1.8 MG/DL — SIGNIFICANT CHANGE UP (ref 1.6–2.6)
MCHC RBC-ENTMCNC: 30.4 GM/DL — LOW (ref 32–36)
MCHC RBC-ENTMCNC: 30.5 PG — SIGNIFICANT CHANGE UP (ref 27–34)
MCHC RBC-ENTMCNC: 31.1 PG — SIGNIFICANT CHANGE UP (ref 27–34)
MCHC RBC-ENTMCNC: 31.5 GM/DL — LOW (ref 32–36)
MCV RBC AUTO: 100.4 FL — HIGH (ref 80–100)
MCV RBC AUTO: 98.7 FL — SIGNIFICANT CHANGE UP (ref 80–100)
NRBC # BLD: 0 /100 WBCS — SIGNIFICANT CHANGE UP (ref 0–0)
NRBC # BLD: 0 /100 WBCS — SIGNIFICANT CHANGE UP (ref 0–0)
PHOSPHATE SERPL-MCNC: 2.6 MG/DL — SIGNIFICANT CHANGE UP (ref 2.5–4.5)
PLATELET # BLD AUTO: 235 K/UL — SIGNIFICANT CHANGE UP (ref 150–400)
PLATELET # BLD AUTO: 262 K/UL — SIGNIFICANT CHANGE UP (ref 150–400)
POTASSIUM SERPL-MCNC: 3.5 MMOL/L — SIGNIFICANT CHANGE UP (ref 3.5–5.3)
POTASSIUM SERPL-SCNC: 3.5 MMOL/L — SIGNIFICANT CHANGE UP (ref 3.5–5.3)
PROT SERPL-MCNC: 4.9 G/DL — LOW (ref 6–8.3)
PROTHROM AB SERPL-ACNC: 14.7 SEC — HIGH (ref 10–12.9)
PTH-INTACT FLD-MCNC: 39 PG/ML — SIGNIFICANT CHANGE UP (ref 15–65)
RBC # BLD: 2.38 M/UL — LOW (ref 4.2–5.8)
RBC # BLD: 2.49 M/UL — LOW (ref 4.2–5.8)
RBC # FLD: 14.3 % — SIGNIFICANT CHANGE UP (ref 10.3–14.5)
RBC # FLD: 14.5 % — SIGNIFICANT CHANGE UP (ref 10.3–14.5)
SODIUM SERPL-SCNC: 141 MMOL/L — SIGNIFICANT CHANGE UP (ref 135–145)
SPECIMEN SOURCE: SIGNIFICANT CHANGE UP
SPECIMEN SOURCE: SIGNIFICANT CHANGE UP
VANCOMYCIN TROUGH SERPL-MCNC: 6.8 UG/ML — LOW (ref 10–20)
VIT D25+D1,25 OH+D1,25 PNL SERPL-MCNC: 32.5 PG/ML — SIGNIFICANT CHANGE UP (ref 19.9–79.3)
WBC # BLD: 9.48 K/UL — SIGNIFICANT CHANGE UP (ref 3.8–10.5)
WBC # BLD: 9.93 K/UL — SIGNIFICANT CHANGE UP (ref 3.8–10.5)
WBC # FLD AUTO: 9.48 K/UL — SIGNIFICANT CHANGE UP (ref 3.8–10.5)
WBC # FLD AUTO: 9.93 K/UL — SIGNIFICANT CHANGE UP (ref 3.8–10.5)

## 2020-01-18 PROCEDURE — 99024 POSTOP FOLLOW-UP VISIT: CPT

## 2020-01-18 PROCEDURE — 99233 SBSQ HOSP IP/OBS HIGH 50: CPT

## 2020-01-18 PROCEDURE — 99232 SBSQ HOSP IP/OBS MODERATE 35: CPT

## 2020-01-18 PROCEDURE — 71045 X-RAY EXAM CHEST 1 VIEW: CPT | Mod: 26

## 2020-01-18 RX ORDER — VANCOMYCIN HCL 1 G
1250 VIAL (EA) INTRAVENOUS EVERY 12 HOURS
Refills: 0 | Status: DISCONTINUED | OUTPATIENT
Start: 2020-01-18 | End: 2020-01-18

## 2020-01-18 RX ORDER — DIPHENOXYLATE HCL/ATROPINE 2.5-.025MG
2 TABLET ORAL
Refills: 0 | Status: DISCONTINUED | OUTPATIENT
Start: 2020-01-18 | End: 2020-01-24

## 2020-01-18 RX ORDER — FUROSEMIDE 40 MG
20 TABLET ORAL ONCE
Refills: 0 | Status: DISCONTINUED | OUTPATIENT
Start: 2020-01-18 | End: 2020-01-18

## 2020-01-18 RX ORDER — MAGNESIUM SULFATE 500 MG/ML
2 VIAL (ML) INJECTION ONCE
Refills: 0 | Status: COMPLETED | OUTPATIENT
Start: 2020-01-18 | End: 2020-01-18

## 2020-01-18 RX ORDER — POTASSIUM CHLORIDE 20 MEQ
20 PACKET (EA) ORAL
Refills: 0 | Status: COMPLETED | OUTPATIENT
Start: 2020-01-18 | End: 2020-01-18

## 2020-01-18 RX ORDER — PIPERACILLIN AND TAZOBACTAM 4; .5 G/20ML; G/20ML
4.5 INJECTION, POWDER, LYOPHILIZED, FOR SOLUTION INTRAVENOUS EVERY 8 HOURS
Refills: 0 | Status: DISCONTINUED | OUTPATIENT
Start: 2020-01-18 | End: 2020-01-19

## 2020-01-18 RX ORDER — CALCIUM GLUCONATE 100 MG/ML
2 VIAL (ML) INTRAVENOUS ONCE
Refills: 0 | Status: COMPLETED | OUTPATIENT
Start: 2020-01-18 | End: 2020-01-18

## 2020-01-18 RX ORDER — PSYLLIUM SEED (WITH DEXTROSE)
1 POWDER (GRAM) ORAL THREE TIMES A DAY
Refills: 0 | Status: DISCONTINUED | OUTPATIENT
Start: 2020-01-18 | End: 2020-02-04

## 2020-01-18 RX ORDER — MORPHINE 10 MG/ML
6 SOLUTION ORAL
Refills: 0 | Status: DISCONTINUED | OUTPATIENT
Start: 2020-01-18 | End: 2020-01-24

## 2020-01-18 RX ORDER — SODIUM CHLORIDE 9 MG/ML
1000 INJECTION, SOLUTION INTRAVENOUS
Refills: 0 | Status: DISCONTINUED | OUTPATIENT
Start: 2020-01-18 | End: 2020-01-19

## 2020-01-18 RX ADMIN — Medication 16 MILLIGRAM(S): at 17:50

## 2020-01-18 RX ADMIN — Medication 0.5 MILLIGRAM(S): at 18:46

## 2020-01-18 RX ADMIN — Medication 2 TABLET(S): at 07:49

## 2020-01-18 RX ADMIN — Medication 975 MILLIGRAM(S): at 05:48

## 2020-01-18 RX ADMIN — Medication 200 GRAM(S): at 03:23

## 2020-01-18 RX ADMIN — Medication 2 TABLET(S): at 12:38

## 2020-01-18 RX ADMIN — OCTREOTIDE ACETATE 100 MICROGRAM(S): 200 INJECTION, SOLUTION INTRAVENOUS; SUBCUTANEOUS at 05:47

## 2020-01-18 RX ADMIN — Medication 1 PACKET(S): at 14:04

## 2020-01-18 RX ADMIN — Medication 166.67 MILLIGRAM(S): at 02:52

## 2020-01-18 RX ADMIN — Medication 975 MILLIGRAM(S): at 17:49

## 2020-01-18 RX ADMIN — MORPHINE 6 MILLIGRAM(S): 10 SOLUTION ORAL at 12:39

## 2020-01-18 RX ADMIN — Medication 20 MILLIEQUIVALENT(S): at 10:18

## 2020-01-18 RX ADMIN — Medication 2 TABLET(S): at 23:11

## 2020-01-18 RX ADMIN — PIPERACILLIN AND TAZOBACTAM 25 GRAM(S): 4; .5 INJECTION, POWDER, LYOPHILIZED, FOR SOLUTION INTRAVENOUS at 23:11

## 2020-01-18 RX ADMIN — OCTREOTIDE ACETATE 100 MICROGRAM(S): 200 INJECTION, SOLUTION INTRAVENOUS; SUBCUTANEOUS at 14:04

## 2020-01-18 RX ADMIN — ENOXAPARIN SODIUM 40 MILLIGRAM(S): 100 INJECTION SUBCUTANEOUS at 12:38

## 2020-01-18 RX ADMIN — Medication 3 MILLILITER(S): at 18:46

## 2020-01-18 RX ADMIN — Medication 250 MILLIMOLE(S): at 03:23

## 2020-01-18 RX ADMIN — Medication 50 GRAM(S): at 03:50

## 2020-01-18 RX ADMIN — Medication 20 MILLIEQUIVALENT(S): at 07:50

## 2020-01-18 RX ADMIN — PANTOPRAZOLE SODIUM 40 MILLIGRAM(S): 20 TABLET, DELAYED RELEASE ORAL at 07:50

## 2020-01-18 RX ADMIN — Medication 975 MILLIGRAM(S): at 13:09

## 2020-01-18 RX ADMIN — MORPHINE 6 MILLIGRAM(S): 10 SOLUTION ORAL at 23:11

## 2020-01-18 RX ADMIN — MORPHINE 6 MILLIGRAM(S): 10 SOLUTION ORAL at 17:49

## 2020-01-18 RX ADMIN — Medication 0.5 MILLIGRAM(S): at 05:55

## 2020-01-18 RX ADMIN — Medication 16 MILLIGRAM(S): at 07:49

## 2020-01-18 RX ADMIN — Medication 16 MILLIGRAM(S): at 12:40

## 2020-01-18 RX ADMIN — Medication 1 PACKET(S): at 23:11

## 2020-01-18 RX ADMIN — Medication 975 MILLIGRAM(S): at 23:11

## 2020-01-18 RX ADMIN — PIPERACILLIN AND TAZOBACTAM 25 GRAM(S): 4; .5 INJECTION, POWDER, LYOPHILIZED, FOR SOLUTION INTRAVENOUS at 14:03

## 2020-01-18 RX ADMIN — Medication 3 MILLILITER(S): at 05:55

## 2020-01-18 RX ADMIN — MORPHINE 6 MILLIGRAM(S): 10 SOLUTION ORAL at 07:50

## 2020-01-18 RX ADMIN — Medication 16 MILLIGRAM(S): at 23:12

## 2020-01-18 RX ADMIN — Medication 975 MILLIGRAM(S): at 12:39

## 2020-01-18 RX ADMIN — Medication 975 MILLIGRAM(S): at 05:47

## 2020-01-18 RX ADMIN — Medication 2 TABLET(S): at 17:48

## 2020-01-18 RX ADMIN — Medication 1: at 17:49

## 2020-01-18 RX ADMIN — Medication 3 MILLILITER(S): at 12:49

## 2020-01-18 RX ADMIN — Medication 975 MILLIGRAM(S): at 23:12

## 2020-01-18 RX ADMIN — PIPERACILLIN AND TAZOBACTAM 25 GRAM(S): 4; .5 INJECTION, POWDER, LYOPHILIZED, FOR SOLUTION INTRAVENOUS at 05:47

## 2020-01-18 RX ADMIN — Medication 20 MILLIEQUIVALENT(S): at 05:47

## 2020-01-18 RX ADMIN — CHLORHEXIDINE GLUCONATE 1 APPLICATION(S): 213 SOLUTION TOPICAL at 05:47

## 2020-01-18 RX ADMIN — OCTREOTIDE ACETATE 100 MICROGRAM(S): 200 INJECTION, SOLUTION INTRAVENOUS; SUBCUTANEOUS at 23:10

## 2020-01-18 RX ADMIN — SODIUM CHLORIDE 10 MILLILITER(S): 9 INJECTION, SOLUTION INTRAVENOUS at 07:56

## 2020-01-18 NOTE — PROGRESS NOTE ADULT - SUBJECTIVE AND OBJECTIVE BOX
SURGICAL INTENSIVE CARE UNIT DAILY PROGRESS NOTE    24 HOUR EVENTS:   - Diuresed 20mg IV lasix.   - Chest tubes placed to waterseal per Thoracic.   - Martinez out, TOV.   - Combicath with GPC, GNR.   - Vanc dosing increased.     HPI:  75y Male abdominal pain, nausea, hematemesis, and poor PO intake for ~2-3 days. Labs significant for an CHI w/ Cr 2.74 and lactate of 9.4. He was also notably tachycardic to the 110s and hypotensive w/ SBP in the 70s. He was given 2 units of PRBCs and 2 L of LR in the ED due to concern for upper GI bleeding. Imaging revealed high grade SBO in the RLQ. Patient was taken to the OR emergently for an exploratory laparotomy, lysis of adhesions, decompression of bowel via enterotomy w/ primary repair, and Abthera VAC placement. Of note, the distal 50% of the bowel appeared dusky but viable. He required vasopressor support with phenylephrine and vasopressin infusions. He received 3000 mL of crystalloid w/ EBL of 10 mL and UOP of 25 mL. Patient was left intubated at the end of the case so SICU was consulted for hemodynamic monitoring. Taken back to the OR on 12/8 and underwent take down of Abthera, washout and re-application of the Abthera vac. Went back to OR on 12/10 night for colectomy. Post-op course complicated by short gut syndrome with high ileostomy output as well as left pleural effusion requiring IR guided pigtail catheter placement 12/30. Patient then had VATS on left side for PleurX catheter placement. 01/10.     NEURO: AOx3.     RESPIRATORY  RR: 19 (01-18-20 @ 02:15) (16 - 29)  SpO2: 100% (01-18-20 @ 02:15) (93% - 100%)  ABG - ( 17 Jan 2020 12:12 )  pH: 7.35  /  pCO2: 61    /  pO2: 147   / HCO3: 33    / Base Excess: 7.0   /  SaO2: 100       CARDIOVASCULAR  HR: 64 (01-18-20 @ 02:15) (64 - 123)  BP: 95/51 (01-18-20 @ 02:15) (95/51 - 114/57)  BP(mean): 71 (01-18-20 @ 02:15) (71 - 82)  ABP: 90/41 (01-18-20 @ 02:15) (90/41 - 130/54)  ABP(mean): 61 (01-18-20 @ 02:15) (61 - 86)    GI/NUTRITION  Soft, ostomy functioning.     GENITOURINARY  I&O's Detail    01-16 @ 07:01  -  01-17 @ 07:00  --------------------------------------------------------  IN:    fentaNYL  Infusion: 1.4 mL    norepinephrine Infusion: 57.9 mL    Oral Fluid: 850 mL    propofol Infusion: 19.4 mL    sodium chloride 0.9%: 1800 mL    Solution: 900 mL    Solution: 835 mL    Solution: 600 mL  Total IN: 5063.7 mL    OUT:    Chest Tube: 250 mL    Chest Tube: 180 mL    Ileostomy: 1670 mL    Indwelling Catheter - Urethral: 925 mL  Total OUT: 3025 mL    Total NET: 2038.7 mL      01-17 @ 07:01 - 01-18 @ 02:45  --------------------------------------------------------  IN:    Oral Fluid: 370 mL    sodium chloride 0.9%: 225 mL    sodium chloride 0.9%.: 480 mL    sodium chloride 0.9%.: 350 mL    Solution: 225 mL    Solution: 437.5 mL    Solution: 900 mL  Total IN: 2987.5 mL    OUT:    Chest Tube: 120 mL    Chest Tube: 240 mL    Ileostomy: 2500 mL    Indwelling Catheter - Urethral: 1685 mL  Total OUT: 4545 mL    Total NET: -1557.5 mL          01-18    141  |  101  |  12  ----------------------------<  112<H>  3.5   |  30  |  0.49<L>    Ca    7.0<L>      18 Jan 2020 01:13  Phos  2.6     01-18  Mg     1.8     01-18    TPro  4.9<L>  /  Alb  2.0<L>  /  TBili  0.7  /  DBili  0.4<H>  /  AST  13  /  ALT  13  /  AlkPhos  52  01-18      HEMATOLOGIC                        7.4    9.93  )-----------( 235      ( 18 Jan 2020 01:13 )             23.5     PT/INR - ( 18 Jan 2020 01:13 )   PT: 14.7 sec;   INR: 1.28 ratio         PTT - ( 18 Jan 2020 01:13 )  PTT:44.4 sec    INFECTIOUS DISEASES  RECENT CULTURES:  Specimen Source: Bronch Wash Combicath  Date/Time: 01-16 @ 06:43  Culture Results:   No growth to date.  Gram Stain:   Moderate polymorphonuclear leukocytes per low power field  Few Squamous epithelial cells per low power field  Rare Gram positive cocci in pairs  Rare Gram Negative Rods  Organism: --  Specimen Source: .Blood Blood  Date/Time: 01-16 @ 02:02  Culture Results:   No growth to date.  Gram Stain: --  Organism: --  Specimen Source: .Blood Blood-Arterial  Date/Time: 01-15 @ 18:04  Culture Results:   No growth to date.  Gram Stain: --  Organism: --  Specimen Source: .Blood Blood-Peripheral  Date/Time: 01-15 @ 10:09  Culture Results:   Testing in progress  Gram Stain: --  Organism: --  Specimen Source: .Blood Blood  Date/Time: 01-13 @ 18:15  Culture Results:   Testing in progress  Gram Stain: --  Organism: --  Specimen Source: .Blood Blood-Peripheral  Date/Time: 01-13 @ 16:31  Culture Results:   No growth to date.  Gram Stain: --  Organism: --  Specimen Source: .Blood Blood-Peripheral  Date/Time: 01-13 @ 06:13  Culture Results:   No growth to date.  Gram Stain: --  Organism: --      ENDOCRINE  CAPILLARY BLOOD GLUCOSE      POCT Blood Glucose.: 104 mg/dL (17 Jan 2020 23:04)  POCT Blood Glucose.: 164 mg/dL (17 Jan 2020 17:04)  POCT Blood Glucose.: 124 mg/dL (17 Jan 2020 11:05)  POCT Blood Glucose.: 91 mg/dL (17 Jan 2020 05:27)

## 2020-01-18 NOTE — PROGRESS NOTE ADULT - ASSESSMENT
75 y/o M presenting with septic shock and high grade SBO s/p exploratory laparotomy, lysis of adhesions, decompression of bowel via enterotomy w/ primary repair, and Abthera VAC placement on 12/6; s/p take down of Abthera, washout and re-application of the Abthera vac on 12/8. Now s/p SBR and end ileostomy on 12/10 acute respiratory distress now improving, Pneumothorax, hyperglycemia, delirium. Now still with persistent pneumothorax when chest pigtail clamped.   12/23 VSS on room air recommending IR drainage of left chest  12/24 No evidence of air leak to left chest tube. Tube clamped. Repeat chest cxr in 4 hours. Anticipate chest tube removal if lung remains expanded. Drainage of loculated left effusion discussed with IR. IR to reconsult for drainage once initial tube removed. Discussed plan with Dr Mayes and IR Fellow  12/24  On NC ,    VSSchest xray sm lt pl eff, left chest tube dc'd.  12/26    2l NC   co  sob,  lt side diminshed  12/27 VSS, CXR with unchanged loculated left pleural effusion- IR consulted for pigtail placement, states effusion can be drained via thoracentesis, pigtail placement not indicated at this time - Dr. Mayes to speak with IR attending.   12/30 Patient with persistent PTX  and left pleural effusion now for IR drainage with Dr Elkins.   12/31 HD stable, L PTC w/ high output, serosanguinous drainage, 990 ml overnight/ 2200 in 24 hrs. Tolerating 2L NC supplemental O2, Cyto pending, continue monitor drainage output.   1/1: LPTC still with High output-400/24h. Continue with pigtail cath drain.  1/ 2     lt pigtail draining  on lws  1/3 HD stable, left pigtail catheter continues to drain. Incentive spirometer encouraged, OOB and mobility, continue pulmonary toileting. Maintain chest tube to suction and monitor output.   1/4: Chest tube milked to prevent clogging of tube, Continues to drain with high out put.  1/5 remains with high out put 170/700. Continue drainage  1/6  persistent drainage from lt pl tube  1/7 Left pigtail 150/450. Daily CXR. Planning for pleurex cath placement on Friday 1/10 w/ Dr. Mayes  1/8        Lt pleural tube   persistent draining    1/9 preop for pleurX placement tomorrow. CT clamped. NPO after MN, type and cross x2.   1/10 s/p VATS L pleurx cath placement   1/11 HD stable, no resp distress, maintain L pleurex to suction overnight, CXR in AM.   1/12 pleurex waterseal  1/13   RRT  called for respiratory distress   plx to pleurvac   back to lws   min drainage  no a/l  1/14 VSS, Left pleurx catheter minimal drainage 10ml/24h, CXR: small left pleural effusion with left pleurx catheter in place.     1/15 Overnight episode of bradycardia and respiratory distress that stabilized with BiPap support, concerned for anemia requiring 2 U PRBC and increase in left pleural effusion, minimal drainage in pleurx.  1/15 s/p bronch, Left VATS. Evacuation of 2L blood from L chest, pleurX removal, chest ubes x2  1/16 HD stable, minimal vasopressors, CXR stable, currently SBT, CT x2 remains to suction, draining.   1/17 HD stable, CXR stable, will water seal chest tubes today, repeat CXR in AM. Cont to monitor drainage.   1/18: L apical chest tube with small AL. CXr appear stable tiny left apical PTX seen on cxr. Continue chest tube to water seal for now as d/w Dr. Mayes today.

## 2020-01-18 NOTE — PROGRESS NOTE ADULT - ASSESSMENT
74 y/o M presenting with septic shock and high grade SBO s/p exploratory laparotomy, lysis of adhesions, decompression of bowel via enterotomy w/ primary repair, and Abthera VAC placement on 12/6; s/p take down of Abthera, washout and re-application of the Abthera vac on 12/8. 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 s/p L chest tube placement by IR 12/30 for pleural effusion now s/p VATS, with chest tube insertion for drainage of pleural effusion. RRT called 1/13 AM for hypoxia, patient transferred back to SICU.  Patient continued SOB, chest drain possible obstruction expanding. Patent taken back to OR emergently 1/15 for clot evac and VATS.    -continue regular diet with supplements  -monitor ostomy output, anti motility agents per SICU and primary team      TPN pager [phone], spectra [phone]  discussed with Dr. Chacko and Dr. MELISSA Siegel 76 y/o M presenting with septic shock and high grade SBO s/p exploratory laparotomy, lysis of adhesions, decompression of bowel via enterotomy w/ primary repair, and Abthera VAC placement on 12/6; s/p take down of Abthera, washout and re-application of the Abthera vac on 12/8. 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 s/p L chest tube placement by IR 12/30 for pleural effusion now s/p VATS, with chest tube insertion for drainage of pleural effusion. RRT called 1/13 AM for hypoxia, patient transferred back to SICU.  Patient continued SOB, chest drain possible obstruction expanding. Patent taken back to OR emergently 1/15 for clot evac and VATS.    -continue regular diet with supplements  -monitor ostomy output, anti motility agents per SICU and primary team      TPN pager [phone], spectra [phone]  discussed with Dr. Chacko

## 2020-01-18 NOTE — PROGRESS NOTE ADULT - SUBJECTIVE AND OBJECTIVE BOX
Subjective: Pt states" " Denies any CP, SOB, palpitations. No acute events overnight.    Vital Signs:  Vital Signs Last 24 Hrs  T(C): 36.8 (01-18-20 @ 07:00), Max: 37 (01-17-20 @ 23:00)  T(F): 98.2 (01-18-20 @ 07:00), Max: 98.6 (01-17-20 @ 23:00)  HR: 79 (01-18-20 @ 07:00) (62 - 123)  BP: 95/51 (01-18-20 @ 02:15) (95/51 - 95/51)  RR: 15 (01-18-20 @ 07:00) (10 - 29)  SpO2: 98% (01-18-20 @ 07:00) (92% - 100%) on (O2)        Relevant labs, radiology and Medications reviewed                        7.4    9.93  )-----------( 235      ( 18 Jan 2020 01:13 )             23.5     01-18    141  |  101  |  12  ----------------------------<  112<H>  3.5   |  30  |  0.49<L>    Ca    7.0<L>      18 Jan 2020 01:13  Phos  2.6     01-18  Mg     1.8     01-18    TPro  4.9<L>  /  Alb  2.0<L>  /  TBili  0.7  /  DBili  0.4<H>  /  AST  13  /  ALT  13  /  AlkPhos  52  01-18    PT/INR - ( 18 Jan 2020 01:13 )   PT: 14.7 sec;   INR: 1.28 ratio         PTT - ( 18 Jan 2020 01:13 )  PTT:44.4 sec  MEDICATIONS  (STANDING):  acetaminophen   Tablet .. 975 milliGRAM(s) Oral every 6 hours  albuterol/ipratropium for Nebulization. 3 milliLiter(s) Nebulizer every 6 hours  buDESOnide    Inhalation Suspension 0.5 milliGRAM(s) Inhalation every 12 hours  chlorhexidine 2% Cloths 1 Application(s) Topical <User Schedule>  diphenoxylate/atropine 2 Tablet(s) Oral <User Schedule>  enoxaparin Injectable 40 milliGRAM(s) SubCutaneous daily  insulin lispro (HumaLOG) corrective regimen sliding scale   SubCutaneous every 6 hours  lactated ringers. 1000 milliLiter(s) (10 mL/Hr) IV Continuous <Continuous>  loperamide 16 milliGRAM(s) Oral <User Schedule>  octreotide  Injectable 100 MICROGram(s) SubCutaneous three times a day  opium Tincture 6 milliGRAM(s) Oral <User Schedule>  pantoprazole    Tablet 40 milliGRAM(s) Oral before breakfast  piperacillin/tazobactam IVPB.. 4.5 Gram(s) IV Intermittent every 8 hours  potassium chloride    Tablet ER 20 milliEquivalent(s) Oral every 2 hours  psyllium Powder 1 Packet(s) Oral three times a day    MEDICATIONS  (PRN):  ondansetron Injectable 4 milliGRAM(s) IV Push every 6 hours PRN Nausea and/or Vomiting      I&O's Summary    17 Jan 2020 07:01  -  18 Jan 2020 07:00  --------------------------------------------------------  IN: 3737.5 mL / OUT: 4770 mL / NET: -1032.5 mL        IMAGING reviewed    CXR:    CT Chest:    PAST MEDICAL & SURGICAL HISTORY:  COPD with hypoxia  ETOHism  ETOH abuse  History of lumbosacral spine surgery  History of prostate surgery  History of appendectomy       Physical Exam:  Neurology: A&Ox3, nonfocal, WILEY x 4, NAD  Respiratory: B/L BS CTA, diminished at bases, No wheezing, rales, rhonchi  CV: RRR, S1S2

## 2020-01-18 NOTE — PROGRESS NOTE ADULT - PROBLEM SELECTOR PLAN 1
s/p 1/10 Left VATS pleurx catheter placement  s/p 1/15 L VATS evacuation L hemothorax - intubated  Continue chest tubes to water seal   Strict I & O's- monitor drainage from chest tubes  Daily CXR  Continue management as per primary team

## 2020-01-18 NOTE — PROGRESS NOTE ADULT - ASSESSMENT
76 y/o M presenting with septic shock and high grade SBO s/p exploratory laparotomy, lysis of adhesions, decompression of bowel via enterotomy w/ primary repair, and Abthera VAC placement on 12/6; s/p take down of Abthera, washout and re-application of the Abthera vac on 12/8. 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 s/p L chest tube placement by IR 12/30 for pleural effusion now s/p VATS, with chest tube insertion for drainage of pleural effusion. RRT called 1/13 AM for hypoxia, patient transferred back to SICU.  Patient continued SOB, chest drain possible obstruction expanding. Patent taken back to OR emergently 1/15 for clot evac and VATS.    - Appreciate continued SICU care  - Diet as tolerated  - Continue antimotility drugs  - F/U CT surgery

## 2020-01-18 NOTE — PROGRESS NOTE ADULT - ATTENDING COMMENTS
Patient seen and examined today  s/p vats with 2.5 liters of hematoma evacuated pOD #3    Acute respiratory insufficiency -continues to necessitate 4 LO2 NC supplementation   Continue oxygen supplementation with goal SpO2> 92%  CXR with mild pulmonary edema    CT remain to drain, now serosanguinous drainage    leukocytosis- improving  -will stop the vancomycin     High ileostomy output, will hold repletions today as well as hold the lasix and see what his fluid status is with a repeat CXR and other parameters  Hyperglycemia - continue ISS  -goal BG < 180    Will continue to monitor respiratory status and CT output closely

## 2020-01-18 NOTE — PROGRESS NOTE ADULT - ASSESSMENT
73 y/o male presenting with high grade SBO s/p exploratory laparotomy, lysis of adhesions, decompression of bowel via enterotomy w/ primary repair, & Abthera VAC placement (12/06/2019); RTOR for re-exploration w/ Abthera VAC replacement (12/08/2019) to allow for demarcation of ischemic small bowel; RTOR for re-exploration, small bowel resection of 150 cm (150 cm remaining), ileocecetomy, end ileostomy, mucous fistula, and abdominal closure (12/10/2019); hospital course complicated by SVT, short bowel syndrome requiring repletions w/ IV fluids, malnutrition requiring TPN, intermittent episodes of hypotension, spontaneous left pneumothorax s/p pigtail catheter (12/15/2019-12/24/2019), left pleural effusion s/p pigtail catheter w/ IR (12/30/2019), dysphagia, and oral HSV lesions, placement of Pleurx on 1/10, now re-admitted to SICU with acute hypoxic respiratory failure.     Neuro: no acute issues  - Pain control as needed with acetaminophen    Resp: COPD, spontaneous left pneumothorax s/p pigtail catheter, left pleural effusion s/p Pleurx 1/10.  Intubated for respiratory distress s/p VATS 01/15 with 2 Liter of blood evacuation   - Cont chest tubes to waterseal, Follow up AM CXR.   - Pulmicort and Duoneb for COPD  - Will need outpatient follow-up with a pulmonologist for his COPD as patient does not currently have one    CV: Bradycardia, SVT.   - Monitor vital signs    GI: s/p exploratory laparotomy, Grecia, decompression of bowel via enterotomy w/ primary repair, & Abthera VAC placement (12/06/2019); re-exploration w/ Abthera VAC replacement (12/08/2019); re-exploration, small bowel resection of 150 cm (150 cm remaining), ileocecetomy, end ileostomy, mucous fistula, and abdominal closure (12/10/2019); short bowel syndrome, malnutrition, dysphagia  - Dsyphagia diet   - Continue Lomotil, tincture of opium, loperamide, and octreotide for short bowel syndrome  - Protonix to decrease gastric secretions  - Continue 0.5/1 ostomy repletions    Renal: no acute issues  - Monitor I&Os  - Monitor electrolytes and replete as necessary    Heme: anemia likely secondary to malnutrition / malabsorption  - Monitor CBC and coags  - Q12 CBCs   - Follow up anemia labs  - Lovenox for VTE prophylaxis    ID: oral HSV lesions (s/p acyclovir 12/23/2019-01/02/2020)  - Fungitell elevated: HIV testing, LDH pending.   - Combicath 1/16 with rare GPC and GNR.  - Blood cx 1/16 NGTD.     Endo: no acute issues  - Monitor glucose before meals & at bedtime while on TPN    Disposition:  - Will remain in SICU

## 2020-01-18 NOTE — PROGRESS NOTE ADULT - SUBJECTIVE AND OBJECTIVE BOX
Mr. Hernandez is comfortable in bed  He denies any nausea, vomiting, fever or chills  Denies chest pain  States had some shortness of breath    ICU Vital Signs Last 24 Hrs  T(C): 36.8 (18 Jan 2020 07:00), Max: 37 (17 Jan 2020 23:00)  T(F): 98.2 (18 Jan 2020 07:00), Max: 98.6 (17 Jan 2020 23:00)  HR: 79 (18 Jan 2020 07:00) (62 - 123)  BP: 95/51 (18 Jan 2020 02:15) (95/51 - 95/51)  BP(mean): 71 (18 Jan 2020 02:15) (71 - 71)  ABP: 117/53 (18 Jan 2020 07:00) (90/41 - 130/54)  ABP(mean): 80 (18 Jan 2020 07:00) (61 - 86)  RR: 15 (18 Jan 2020 07:00) (10 - 29)  SpO2: 98% (18 Jan 2020 07:00) (92% - 100%)      On exam: awake, alert and oriented  Two left sided chest tubes in place, serosanguinous output  Abd is soft, not tender and not distended  No overlying erythema over surgical scar  Ileostomy is pink, viable, and with 2550mL of output over the last 24 hours.

## 2020-01-18 NOTE — PROGRESS NOTE ADULT - SUBJECTIVE AND OBJECTIVE BOX
[facility] NUTRITION SUPPORT / TPN -- FOLLOW UP NOTE  --------------------------------------------------------------------------------    24 hour events/subjective:  CT x 2 placed to water seal.  Rosas removed, voiding.  Denies N/V.  Reports eating breakfast and will try to consume supplements between meals.    Diet:  Diet, Mechanical Soft:   No Carb Prosource (1pkg = 15gms Protein)     Qty per Day:  2  MCT Oil (HNT Only)     Qty per Day:  15mL  Supplement Feeding Modality:  Oral  Ensure Enlive Cans or Servings Per Day:  2       Frequency:  Daily  Promote Cans or Servings Per Day:  2       Frequency:  Daily (01-17-20 @ 12:44)      PAST HISTORY  --------------------------------------------------------------------------------  No significant changes to PMH, PSH, FHx, SHx, unless otherwise noted    ALLERGIES & MEDICATIONS  --------------------------------------------------------------------------------  Allergies    IV Contrast (Hives)    Intolerances      Standing Inpatient Medications  acetaminophen   Tablet .. 975 milliGRAM(s) Oral every 6 hours  albuterol/ipratropium for Nebulization. 3 milliLiter(s) Nebulizer every 6 hours  buDESOnide    Inhalation Suspension 0.5 milliGRAM(s) Inhalation every 12 hours  chlorhexidine 2% Cloths 1 Application(s) Topical <User Schedule>  diphenoxylate/atropine 2 Tablet(s) Oral <User Schedule>  enoxaparin Injectable 40 milliGRAM(s) SubCutaneous daily  insulin lispro (HumaLOG) corrective regimen sliding scale   SubCutaneous every 6 hours  lactated ringers. 1000 milliLiter(s) IV Continuous <Continuous>  loperamide 16 milliGRAM(s) Oral <User Schedule>  octreotide  Injectable 100 MICROGram(s) SubCutaneous three times a day  opium Tincture 6 milliGRAM(s) Oral <User Schedule>  pantoprazole    Tablet 40 milliGRAM(s) Oral before breakfast  piperacillin/tazobactam IVPB.. 4.5 Gram(s) IV Intermittent every 8 hours  potassium chloride    Tablet ER 20 milliEquivalent(s) Oral every 2 hours  psyllium Powder 1 Packet(s) Oral three times a day    PRN Inpatient Medications  ondansetron Injectable 4 milliGRAM(s) IV Push every 6 hours PRN      VITALS/PHYSICAL EXAM  --------------------------------------------------------------------------------  T(C): 36.8 (01-18-20 @ 07:00), Max: 37 (01-17-20 @ 23:00)  HR: 87 (01-18-20 @ 09:00) (62 - 123)  BP: 124/58 (01-18-20 @ 07:00) (95/51 - 124/58)  RR: 24 (01-18-20 @ 09:00) (10 - 39)  SpO2: 98% (01-18-20 @ 09:00) (92% - 100%)  Wt(kg): --        01-17-20 @ 07:01  -  01-18-20 @ 07:00  --------------------------------------------------------  IN: 3737.5 mL / OUT: 4770 mL / NET: -1032.5 mL    01-18-20 @ 07:01 - 01-18-20 @ 10:16  --------------------------------------------------------  IN: 70 mL / OUT: 0 mL / NET: 70 mL      Physical Exam:    General: AAOx3, NAD, lying comfortably in bed eating breakfast  HEENT: NC/AT  Respiratory: nonlabored breathing  Cardiovascular: RRR, normal S1 and S2, no murmurs or gallops  Abdomen: non-distended, soft, non-tender.  ostomy pink and viable with liquid stool, connected to rosas bag  Extremities: WWP  Drain: left chest tubes x2 to water seal    LABS/STUDIES  --------------------------------------------------------------------------------              7.4    9.93  >-----------<  235      [01-18-20 @ 01:13]              23.5     141  |  101  |  12  ----------------------------<  112      [01-18-20 @ 01:13]  3.5   |  30  |  0.49        Ca     7.0     [01-18-20 @ 01:13]      Mg     1.8     [01-18-20 @ 01:13]      Phos  2.6     [01-18-20 @ 01:13]    TPro  4.9  /  Alb  2.0  /  TBili  0.7  /  DBili  0.4  /  AST  13  /  ALT  13  /  AlkPhos  52  [01-18-20 @ 01:13]    PT/INR: PT 14.7 , INR 1.28       [01-18-20 @ 01:13]  PTT: 44.4       [01-18-20 @ 01:13]      Ca ionizedBlood Gas Arterial - Calcium, Ionized: 1.06 mmoL/L (01-17-20 @ 12:12)  Blood Gas Arterial - Calcium, Ionized: 1.14 mmoL/L (01-17-20 @ 00:07)    Creatinine Trend:  POC glucoseGlucose, Serum: 112 mg/dL (01-18-20 @ 01:13)  CAPILLARY BLOOD GLUCOSE      POCT Blood Glucose.: 132 mg/dL (18 Jan 2020 05:54)  POCT Blood Glucose.: 104 mg/dL (17 Jan 2020 23:04)  POCT Blood Glucose.: 164 mg/dL (17 Jan 2020 17:04)  POCT Blood Glucose.: 124 mg/dL (17 Jan 2020 11:05)    PrealbuminPrealbumin, Serum: 15 mg/dL (01-15-20 @ 09:01)  Prealbumin, Serum: 13 mg/dL (01-14-20 @ 07:29)  Prealbumin, Serum: 13 mg/dL (01-07-20 @ 02:35)  Prealbumin, Serum: 14 mg/dL (01-06-20 @ 07:40)  Prealbumin, Serum: 16 mg/dL (01-01-20 @ 02:48)    Triglycerides

## 2020-01-19 LAB
-  AMIKACIN: SIGNIFICANT CHANGE UP
-  AZTREONAM: SIGNIFICANT CHANGE UP
-  CEFEPIME: SIGNIFICANT CHANGE UP
-  CEFTAZIDIME: SIGNIFICANT CHANGE UP
-  CIPROFLOXACIN: SIGNIFICANT CHANGE UP
-  GENTAMICIN: SIGNIFICANT CHANGE UP
-  IMIPENEM: SIGNIFICANT CHANGE UP
-  LEVOFLOXACIN: SIGNIFICANT CHANGE UP
-  MEROPENEM: SIGNIFICANT CHANGE UP
-  PIPERACILLIN/TAZOBACTAM: SIGNIFICANT CHANGE UP
-  TOBRAMYCIN: SIGNIFICANT CHANGE UP
ALBUMIN SERPL ELPH-MCNC: 2 G/DL — LOW (ref 3.3–5)
ALP SERPL-CCNC: 53 U/L — SIGNIFICANT CHANGE UP (ref 40–120)
ALT FLD-CCNC: 14 U/L — SIGNIFICANT CHANGE UP (ref 10–45)
ANION GAP SERPL CALC-SCNC: 8 MMOL/L — SIGNIFICANT CHANGE UP (ref 5–17)
AST SERPL-CCNC: 10 U/L — SIGNIFICANT CHANGE UP (ref 10–40)
BILIRUB DIRECT SERPL-MCNC: 0.4 MG/DL — HIGH (ref 0–0.2)
BILIRUB INDIRECT FLD-MCNC: 0.3 MG/DL — SIGNIFICANT CHANGE UP (ref 0.2–1)
BILIRUB SERPL-MCNC: 0.7 MG/DL — SIGNIFICANT CHANGE UP (ref 0.2–1.2)
BUN SERPL-MCNC: 9 MG/DL — SIGNIFICANT CHANGE UP (ref 7–23)
CALCIUM SERPL-MCNC: 7.4 MG/DL — LOW (ref 8.4–10.5)
CHLORIDE SERPL-SCNC: 99 MMOL/L — SIGNIFICANT CHANGE UP (ref 96–108)
CO2 SERPL-SCNC: 33 MMOL/L — HIGH (ref 22–31)
CREAT SERPL-MCNC: 0.46 MG/DL — LOW (ref 0.5–1.3)
CULTURE RESULTS: SIGNIFICANT CHANGE UP
GLUCOSE BLDC GLUCOMTR-MCNC: 138 MG/DL — HIGH (ref 70–99)
GLUCOSE SERPL-MCNC: 124 MG/DL — HIGH (ref 70–99)
HCT VFR BLD CALC: 24.1 % — LOW (ref 39–50)
HGB BLD-MCNC: 7.3 G/DL — LOW (ref 13–17)
MAGNESIUM SERPL-MCNC: 1.7 MG/DL — SIGNIFICANT CHANGE UP (ref 1.6–2.6)
MCHC RBC-ENTMCNC: 30.2 PG — SIGNIFICANT CHANGE UP (ref 27–34)
MCHC RBC-ENTMCNC: 30.3 GM/DL — LOW (ref 32–36)
MCV RBC AUTO: 99.6 FL — SIGNIFICANT CHANGE UP (ref 80–100)
METHOD TYPE: SIGNIFICANT CHANGE UP
NRBC # BLD: 0 /100 WBCS — SIGNIFICANT CHANGE UP (ref 0–0)
ORGANISM # SPEC MICROSCOPIC CNT: SIGNIFICANT CHANGE UP
ORGANISM # SPEC MICROSCOPIC CNT: SIGNIFICANT CHANGE UP
PHOSPHATE SERPL-MCNC: 2.2 MG/DL — LOW (ref 2.5–4.5)
PLATELET # BLD AUTO: 253 K/UL — SIGNIFICANT CHANGE UP (ref 150–400)
POTASSIUM SERPL-MCNC: 4.1 MMOL/L — SIGNIFICANT CHANGE UP (ref 3.5–5.3)
POTASSIUM SERPL-SCNC: 4.1 MMOL/L — SIGNIFICANT CHANGE UP (ref 3.5–5.3)
PROT SERPL-MCNC: 4.9 G/DL — LOW (ref 6–8.3)
RBC # BLD: 2.42 M/UL — LOW (ref 4.2–5.8)
RBC # FLD: 13.9 % — SIGNIFICANT CHANGE UP (ref 10.3–14.5)
SODIUM SERPL-SCNC: 140 MMOL/L — SIGNIFICANT CHANGE UP (ref 135–145)
SPECIMEN SOURCE: SIGNIFICANT CHANGE UP
WBC # BLD: 9.79 K/UL — SIGNIFICANT CHANGE UP (ref 3.8–10.5)
WBC # FLD AUTO: 9.79 K/UL — SIGNIFICANT CHANGE UP (ref 3.8–10.5)

## 2020-01-19 PROCEDURE — 71045 X-RAY EXAM CHEST 1 VIEW: CPT | Mod: 26

## 2020-01-19 PROCEDURE — 99024 POSTOP FOLLOW-UP VISIT: CPT

## 2020-01-19 PROCEDURE — 99233 SBSQ HOSP IP/OBS HIGH 50: CPT

## 2020-01-19 RX ORDER — ERGOCALCIFEROL 1.25 MG/1
50000 CAPSULE ORAL
Refills: 0 | Status: COMPLETED | OUTPATIENT
Start: 2020-01-19 | End: 2020-02-23

## 2020-01-19 RX ORDER — MEROPENEM 1 G/30ML
1000 INJECTION INTRAVENOUS EVERY 8 HOURS
Refills: 0 | Status: COMPLETED | OUTPATIENT
Start: 2020-01-19 | End: 2020-01-26

## 2020-01-19 RX ORDER — MAGNESIUM SULFATE 500 MG/ML
2 VIAL (ML) INJECTION ONCE
Refills: 0 | Status: COMPLETED | OUTPATIENT
Start: 2020-01-19 | End: 2020-01-19

## 2020-01-19 RX ORDER — SODIUM CHLORIDE 9 MG/ML
1000 INJECTION INTRAMUSCULAR; INTRAVENOUS; SUBCUTANEOUS
Refills: 0 | Status: DISCONTINUED | OUTPATIENT
Start: 2020-01-19 | End: 2020-01-24

## 2020-01-19 RX ORDER — TAMSULOSIN HYDROCHLORIDE 0.4 MG/1
0.4 CAPSULE ORAL DAILY
Refills: 0 | Status: DISCONTINUED | OUTPATIENT
Start: 2020-01-19 | End: 2020-02-05

## 2020-01-19 RX ADMIN — Medication 2 TABLET(S): at 12:11

## 2020-01-19 RX ADMIN — Medication 975 MILLIGRAM(S): at 05:37

## 2020-01-19 RX ADMIN — OCTREOTIDE ACETATE 100 MICROGRAM(S): 200 INJECTION, SOLUTION INTRAVENOUS; SUBCUTANEOUS at 14:26

## 2020-01-19 RX ADMIN — MEROPENEM 100 MILLIGRAM(S): 1 INJECTION INTRAVENOUS at 21:54

## 2020-01-19 RX ADMIN — Medication 0.5 MILLIGRAM(S): at 06:18

## 2020-01-19 RX ADMIN — PIPERACILLIN AND TAZOBACTAM 25 GRAM(S): 4; .5 INJECTION, POWDER, LYOPHILIZED, FOR SOLUTION INTRAVENOUS at 05:38

## 2020-01-19 RX ADMIN — TAMSULOSIN HYDROCHLORIDE 0.4 MILLIGRAM(S): 0.4 CAPSULE ORAL at 14:26

## 2020-01-19 RX ADMIN — Medication 50 GRAM(S): at 02:20

## 2020-01-19 RX ADMIN — Medication 1 PACKET(S): at 05:38

## 2020-01-19 RX ADMIN — Medication 3 MILLILITER(S): at 11:54

## 2020-01-19 RX ADMIN — OCTREOTIDE ACETATE 100 MICROGRAM(S): 200 INJECTION, SOLUTION INTRAVENOUS; SUBCUTANEOUS at 05:38

## 2020-01-19 RX ADMIN — Medication 3 MILLILITER(S): at 01:24

## 2020-01-19 RX ADMIN — Medication 975 MILLIGRAM(S): at 05:38

## 2020-01-19 RX ADMIN — Medication 16 MILLIGRAM(S): at 10:43

## 2020-01-19 RX ADMIN — Medication 975 MILLIGRAM(S): at 12:24

## 2020-01-19 RX ADMIN — MORPHINE 6 MILLIGRAM(S): 10 SOLUTION ORAL at 08:36

## 2020-01-19 RX ADMIN — Medication 0.5 MILLIGRAM(S): at 22:44

## 2020-01-19 RX ADMIN — MORPHINE 6 MILLIGRAM(S): 10 SOLUTION ORAL at 17:52

## 2020-01-19 RX ADMIN — Medication 975 MILLIGRAM(S): at 12:11

## 2020-01-19 RX ADMIN — Medication 2 TABLET(S): at 17:56

## 2020-01-19 RX ADMIN — OCTREOTIDE ACETATE 100 MICROGRAM(S): 200 INJECTION, SOLUTION INTRAVENOUS; SUBCUTANEOUS at 21:58

## 2020-01-19 RX ADMIN — Medication 16 MILLIGRAM(S): at 22:00

## 2020-01-19 RX ADMIN — Medication 2 TABLET(S): at 21:57

## 2020-01-19 RX ADMIN — Medication 250 MILLIMOLE(S): at 03:25

## 2020-01-19 RX ADMIN — Medication 3 MILLILITER(S): at 06:18

## 2020-01-19 RX ADMIN — Medication 16 MILLIGRAM(S): at 17:51

## 2020-01-19 RX ADMIN — CHLORHEXIDINE GLUCONATE 1 APPLICATION(S): 213 SOLUTION TOPICAL at 05:38

## 2020-01-19 RX ADMIN — Medication 16 MILLIGRAM(S): at 14:28

## 2020-01-19 RX ADMIN — ENOXAPARIN SODIUM 40 MILLIGRAM(S): 100 INJECTION SUBCUTANEOUS at 12:10

## 2020-01-19 RX ADMIN — Medication 3 MILLILITER(S): at 22:44

## 2020-01-19 RX ADMIN — MEROPENEM 100 MILLIGRAM(S): 1 INJECTION INTRAVENOUS at 14:26

## 2020-01-19 RX ADMIN — MORPHINE 6 MILLIGRAM(S): 10 SOLUTION ORAL at 21:57

## 2020-01-19 RX ADMIN — MORPHINE 6 MILLIGRAM(S): 10 SOLUTION ORAL at 12:10

## 2020-01-19 RX ADMIN — Medication 975 MILLIGRAM(S): at 21:58

## 2020-01-19 RX ADMIN — Medication 1 PACKET(S): at 21:59

## 2020-01-19 RX ADMIN — Medication 975 MILLIGRAM(S): at 17:50

## 2020-01-19 RX ADMIN — PANTOPRAZOLE SODIUM 40 MILLIGRAM(S): 20 TABLET, DELAYED RELEASE ORAL at 08:36

## 2020-01-19 RX ADMIN — Medication 1 PACKET(S): at 14:27

## 2020-01-19 RX ADMIN — Medication 250 MILLIMOLE(S): at 02:20

## 2020-01-19 RX ADMIN — Medication 975 MILLIGRAM(S): at 22:28

## 2020-01-19 RX ADMIN — ERGOCALCIFEROL 50000 UNIT(S): 1.25 CAPSULE ORAL at 14:26

## 2020-01-19 RX ADMIN — Medication 2 TABLET(S): at 08:36

## 2020-01-19 NOTE — PROGRESS NOTE ADULT - ATTENDING COMMENTS
Patient seen and examined today  s/p vats with 2.5 liters of hematoma evacuated pOD #4     Acute respiratory insufficiency -continues to necessitate 2LO2 NC supplementation   Continue oxygen supplementation with goal SpO2> 92%  CXR with mild pulmonary edema stable    CT remain to drain, now serosanguinous drainage with high output     leukocytosis- improving  High ileostomy output- continue lomotil, loperamide, tincture of opium, octreotide and metamucil  -will replace 1 cc: 0.25 cc of ileostomy output     Hyperglycemia - continue ISS  -goal BG < 180    Will continue to monitor respiratory status and CT output closely Patient seen and examined today  s/p vats with 2.5 liters of hematoma evacuated pOD #4     Acute respiratory insufficiency -continues to necessitate 2LO2 NC supplementation   Continue oxygen supplementation with goal SpO2> 92%  CXR with mild pulmonary edema stable    CT remain to drain, now serosanguinous drainage with high output   pseudomonas PNA- resistant to zosyn, will change to meropenem     leukocytosis- improving  High ileostomy output- continue lomotil, loperamide, tincture of opium, octreotide and metamucil  -will replace 1 cc: 0.25 cc of ileostomy output     Hyperglycemia - continue ISS  -goal BG < 180    Will continue to monitor respiratory status and CT output closely

## 2020-01-19 NOTE — PROGRESS NOTE ADULT - ASSESSMENT
73 y/o male presenting with high grade SBO s/p exploratory laparotomy, lysis of adhesions, decompression of bowel via enterotomy w/ primary repair, & Abthera VAC placement (12/06/2019); RTOR for re-exploration w/ Abthera VAC replacement (12/08/2019) to allow for demarcation of ischemic small bowel; RTOR for re-exploration, small bowel resection of 150 cm (150 cm remaining), ileocecetomy, end ileostomy, mucous fistula, and abdominal closure (12/10/2019); hospital course complicated by SVT, short bowel syndrome requiring repletions w/ IV fluids, malnutrition requiring TPN, intermittent episodes of hypotension, spontaneous left pneumothorax s/p pigtail catheter (12/15/2019-12/24/2019), left pleural effusion s/p pigtail catheter w/ IR (12/30/2019), dysphagia, and oral HSV lesions, placement of Pleurx on 1/10, now re-admitted to SICU with acute hypoxic respiratory failure.     Neuro: no acute issues  - Pain control as needed with acetaminophen    Resp: COPD, spontaneous left pneumothorax s/p pigtail catheter, left pleural effusion s/p Pleurx 1/10.  Intubated for respiratory distress s/p VATS 01/15 with 2 Liter of blood evacuation   - Cont chest tubes to waterseal, Follow up AM CXR.   - Pulmicort and Duoneb for COPD  - Will need outpatient follow-up with a pulmonologist for his COPD as patient does not currently have one  - Follow up CXR    CV: Bradycardia, SVT.  - No interval dysrhythmias   - Monitor vital signs    GI: s/p exploratory laparotomy, Grecia, decompression of bowel via enterotomy w/ primary repair, & Abthera VAC placement (12/06/2019); re-exploration w/ ABthera VAC replacement (12/08/2019); re-exploration, small bowel resection of 150 cm (150 cm remaining), ileocecectomy end ileostomy, mucous fistula, and abdominal closure (12/10/2019); short bowel syndrome, malnutrition, dysphagia  - Dysphagia diet   - Continue Lomotil, tincture of opium, loperamide, and octreotide for short bowel syndrome  - Protonix to decrease gastric secretions  - Continue 0.5/1 ostomy repletions (held yesterday)    Renal: no acute issues  - Monitor I&Os  - Monitor electrolytes and replete as necessary  - Possible diuresis today    Heme: anemia likely secondary to malnutrition / malabsorption  - Monitor CBC and coags  - Q12 CBCs   - Follow up anemia labs  - Lovenox for VTE prophylaxis    ID: oral HSV lesions (s/p acyclovir 12/23/2019-01/02/2020)  - Fungitell elevated: HIV testing, LDH pending.   - Combicath 1/16 with rare GPC and GNR (Pseudomonas aeruginosa)  - Blood cx 1/16 NGTD.   - Zosyn 4.5 mg Q8H     Endo: no acute issues  - Monitor glucose before meals & at bedtime while on TPN  - Insulin sliding scale.    Disposition:  - Will remain in SICU

## 2020-01-19 NOTE — PROGRESS NOTE ADULT - SUBJECTIVE AND OBJECTIVE BOX
SURGICAL INTENSIVE CARE UNIT DAILY PROGRESS NOTE    24 HOUR EVENTS:   - High ileostomy output refractory to adjuvant metamucil   - Diuresis and fluid repletions held yesterday  - Vancomycin discontinued  - Zosyn dose increased (3.375 --> 4.5 g) in the setting of Psuedomonas aeruginosa in bronchial washings.      HPI:  75 y/o with PMhx of COPD and ETOH abuse presenting with abdominal pain x2 days. Reports that pain felt more like gas pain, and had similar episodes in the past. Felt nauseous and couldn't vomit. Patient forced himself to vomit had hematemesis. Unable to tolerate PO in last day. Reports unable to pass gas or have BM in 3 days. Patient reports that he does not follow up with a PMD regularly, and can't recall last time he saw doctor. Reports he had colonoscopy recently with no significant findings. Last drink was 3 years ago. Reports some difficulty breathing. Denies fevers, chills, chest pain, or urinary changes. (06 Dec 2019 18:27)      NEURO  A&O x4    RESPIRATORY  RR: 11 (01-19-20 @ 04:00) (9 - 39)  SpO2: 100% (01-19-20 @ 04:00) (95% - 100%)  Wt(kg): --  Mechanical Ventilation:   ABG - ( 17 Jan 2020 12:12 )  pH: 7.35  /  pCO2: 61    /  pO2: 147   / HCO3: 33    / Base Excess: 7.0   /  SaO2: 100     Lactate: x        CARDIOVASCULAR  HR: 66 (01-19-20 @ 04:00) (62 - 88)  BP: 87/54 (01-19-20 @ 03:00) (87/54 - 124/58)  BP(mean): 66 (01-19-20 @ 03:00) (66 - 83)  ABP: 101/46 (01-19-20 @ 04:00) (90/39 - 162/67)  ABP(mean): 68 (01-19-20 @ 04:00) (60 - 109)  Wt(kg): --  CVP(cm H2O): --      GI/NUTRITION  Diet: Mechanical soft    GENITOURINARY  I&O's Detail    01-17 @ 07:01  -  01-18 @ 07:00  --------------------------------------------------------  IN:    lactated ringers.: 50 mL    Oral Fluid: 370 mL    sodium chloride 0.9%: 350 mL    sodium chloride 0.9%: 480 mL    sodium chloride 0.9%: 225 mL    Solution: 537.5 mL    Solution: 1200 mL    Solution: 525 mL  Total IN: 3737.5 mL    OUT:    Chest Tube: 290 mL    Chest Tube: 150 mL    Ileostomy: 2550 mL    Indwelling Catheter - Urethral: 1780 mL  Total OUT: 4770 mL    Total NET: -1032.5 mL      01-18 @ 07:01  -  01-19 @ 05:37  --------------------------------------------------------  IN:    lactated ringers.: 140 mL    Oral Fluid: 740 mL    Solution: 50 mL    Solution: 725 mL  Total IN: 1655 mL    OUT:    Chest Tube: 600 mL    Chest Tube: 100 mL    Ileostomy: 2300 mL    Intermittent Catheterization - Urethral: 700 mL    Voided: 50 mL  Total OUT: 3750 mL  Total NET: -2095 mL      01-19    140  |  99  |  9   ----------------------------<  124<H>  4.1   |  33<H>  |  0.46<L>    Ca    7.4<L>      19 Jan 2020 01:02  Phos  2.2     01-19  Mg     1.7     01-19    TPro  4.9<L>  /  Alb  2.0<L>  /  TBili  0.7  /  DBili  0.4<H>  /  AST  10  /  ALT  14  /  AlkPhos  53  01-19      HEMATOLOGIC                        7.3    9.79  )-----------( 253      ( 19 Jan 2020 01:02 )             24.1     PT/INR - ( 18 Jan 2020 01:13 )   PT: 14.7 sec;   INR: 1.28 ratio    PTT - ( 18 Jan 2020 01:13 )  PTT:44.4 sec    INFECTIOUS DISEASES  RECENT CULTURES:  Specimen Source: Bronch Wash Combicath  Date/Time: 01-16 @ 06:43  Culture Results:   Rare Pseudomonas aeruginosa Susceptibility to follow.  Gram Stain:   Moderate polymorphonuclear leukocytes per low power field  Few Squamous epithelial cells per low power field  Rare Gram positive cocci in pairs  Rare Gram Negative Rods  Organism: --  Specimen Source: .Blood Blood  Date/Time: 01-16 @ 02:02  Culture Results:   No growth to date.  Gram Stain: --  Organism: --  Specimen Source: .Blood Blood-Arterial  Date/Time: 01-15 @ 18:04  Culture Results:   No growth to date.  Gram Stain: --  Organism: --  Specimen Source: .Blood Blood-Peripheral  Date/Time: 01-15 @ 10:09  Culture Results:   Testing in progress  Gram Stain: --  Organism: --      ENDOCRINE  CAPILLARY BLOOD GLUCOSE      POCT Blood Glucose.: 138 mg/dL (19 Jan 2020 05:33)  POCT Blood Glucose.: 93 mg/dL (18 Jan 2020 23:06)  POCT Blood Glucose.: 164 mg/dL (18 Jan 2020 17:45)  POCT Blood Glucose.: 122 mg/dL (18 Jan 2020 12:48)  POCT Blood Glucose.: 132 mg/dL (18 Jan 2020 05:54)      IMAGING:  < from: Xray Chest 1 View- PORTABLE-Routine (01.18.20 @ 06:53) >  IMPRESSION:   Interval placement of a left basilar chest tube.  Small right pleural effusion.  < end of copied text >

## 2020-01-19 NOTE — PROGRESS NOTE ADULT - SUBJECTIVE AND OBJECTIVE BOX
Subjective: Pt states" " Denies any CP, SOB, palpitations. No acute events overnight.    Vital Signs:  Vital Signs Last 24 Hrs  T(C): 36.7 (01-19-20 @ 08:00), Max: 37.5 (01-19-20 @ 03:00)  T(F): 98.1 (01-19-20 @ 08:00), Max: 99.5 (01-19-20 @ 03:00)  HR: 84 (01-19-20 @ 08:00) (59 - 88)  BP: 87/54 (01-19-20 @ 03:00) (87/54 - 87/54)  RR: 28 (01-19-20 @ 08:00) (9 - 35)  SpO2: 97% (01-19-20 @ 08:00) (95% - 100%) on (O2)        Relevant labs, radiology and Medications reviewed                        7.3    9.79  )-----------( 253      ( 19 Jan 2020 01:02 )             24.1     01-19    140  |  99  |  9   ----------------------------<  124<H>  4.1   |  33<H>  |  0.46<L>    Ca    7.4<L>      19 Jan 2020 01:02  Phos  2.2     01-19  Mg     1.7     01-19    TPro  4.9<L>  /  Alb  2.0<L>  /  TBili  0.7  /  DBili  0.4<H>  /  AST  10  /  ALT  14  /  AlkPhos  53  01-19    PT/INR - ( 18 Jan 2020 01:13 )   PT: 14.7 sec;   INR: 1.28 ratio         PTT - ( 18 Jan 2020 01:13 )  PTT:44.4 sec  MEDICATIONS  (STANDING):  acetaminophen   Tablet .. 975 milliGRAM(s) Oral every 6 hours  albuterol/ipratropium for Nebulization. 3 milliLiter(s) Nebulizer every 6 hours  buDESOnide    Inhalation Suspension 0.5 milliGRAM(s) Inhalation every 12 hours  chlorhexidine 2% Cloths 1 Application(s) Topical <User Schedule>  diphenoxylate/atropine 2 Tablet(s) Oral <User Schedule>  enoxaparin Injectable 40 milliGRAM(s) SubCutaneous daily  loperamide 16 milliGRAM(s) Oral <User Schedule>  octreotide  Injectable 100 MICROGram(s) SubCutaneous three times a day  opium Tincture 6 milliGRAM(s) Oral <User Schedule>  pantoprazole    Tablet 40 milliGRAM(s) Oral before breakfast  piperacillin/tazobactam IVPB.. 4.5 Gram(s) IV Intermittent every 8 hours  psyllium Powder 1 Packet(s) Oral three times a day    MEDICATIONS  (PRN):  ondansetron Injectable 4 milliGRAM(s) IV Push every 6 hours PRN Nausea and/or Vomiting      I&O's Summary    18 Jan 2020 07:01  -  19 Jan 2020 07:00  --------------------------------------------------------  IN: 1655 mL / OUT: 4350 mL / NET: -2695 mL        IMAGING: Reviewed    CXR:    CT Chest:    PAST MEDICAL & SURGICAL HISTORY:  COPD with hypoxia  ETOHism  ETOH abuse  History of lumbosacral spine surgery  History of prostate surgery  History of appendectomy       Physical Exam:  Neurology: A&Ox3, nonfocal, WILEY x 4, NAD  Respiratory: B/L BS CTA, diminished at bases, No wheezing, rales, rhonchi  CV: RRR, S1S2

## 2020-01-19 NOTE — PROGRESS NOTE ADULT - ATTENDING COMMENTS
Patient seen and examined  He states he feels well  Denies any nausea, vomiting, fever or chills  States breathing feels better  Tolerating PO intake    Vital Signs Last 24 Hrs  T(C): 36.7 (19 Jan 2020 08:00), Max: 37.5 (19 Jan 2020 03:00)  T(F): 98.1 (19 Jan 2020 08:00), Max: 99.5 (19 Jan 2020 03:00)  HR: 77 (19 Jan 2020 10:00) (59 - 88)  BP: 87/54 (19 Jan 2020 03:00) (87/54 - 87/54)  BP(mean): 66 (19 Jan 2020 03:00) (66 - 66)  RR: 25 (19 Jan 2020 10:00) (9 - 35)  SpO2: 96% (19 Jan 2020 10:00) (95% - 100%)    I&O's Detail    18 Jan 2020 07:01  -  19 Jan 2020 07:00  --------------------------------------------------------  IN:    lactated ringers.: 140 mL    Oral Fluid: 740 mL    Solution: 50 mL    Solution: 725 mL  Total IN: 1655 mL    OUT:    Chest Tube: 750 mL    Chest Tube: 150 mL    Ileostomy: 2700 mL    Intermittent Catheterization - Urethral: 700 mL    Voided: 50 mL  Total OUT: 4350 mL    Total NET: -2695 mL      19 Jan 2020 07:01  -  19 Jan 2020 10:10  --------------------------------------------------------  IN:  Total IN: 0 mL    OUT:    Chest Tube: 70 mL    Chest Tube: 250 mL    Voided: 25 mL  Total OUT: 345 mL    Total NET: -345 mL          On exam: awake, alert and oriented  Supplemental O2 via NC  Left sided chest tubes serosanguinous   Abd is soft, not tender and not distended  Ileostomy is pink, viable and functioning 2700mL over the last 24 hours    - Appreciate continued SICU care  - Follow up thoracic surgery  - Would consider TPN as unclear of how well he is absorbing PO intake

## 2020-01-19 NOTE — PROGRESS NOTE ADULT - ASSESSMENT
76 y/o M presenting with septic shock and high grade SBO s/p exploratory laparotomy, lysis of adhesions, decompression of bowel via enterotomy w/ primary repair, and Abthera VAC placement on 12/6; s/p take down of Abthera, washout and re-application of the Abthera vac on 12/8. 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 s/p L chest tube placement by IR 12/30 for pleural effusion now s/p VATS, with chest tube insertion for drainage of pleural effusion. RRT called 1/13 AM for hypoxia, patient transferred back to SICU.  Patient continued SOB, chest drain possible obstruction expanding. Patent taken back to OR emergently 1/15 for clot evac and VATS.     PLAN:  - pain control   - f/u CTS recs  - Appreciate excellent SICU care    [facility]  [id number]

## 2020-01-19 NOTE — PROGRESS NOTE ADULT - SUBJECTIVE AND OBJECTIVE BOX
Surgery Post-Operative Note    SUBJECTIVE/24hr EVENTS:  - High ileostomy output refractory to adjuvant metamucil   - Diuresis and fluid repletions held yesterday  - Vancomycin discontinued  - Zosyn dose increased (3.375 --> 4.5 g) in the setting of Psuedomonas aeruginosa in bronchial washings.  - Patient seen and examined at bedside   - Patient states feeling ok  - Denies chest pain, SOB, N/V  --------------------------------------------------------------------------------------------------  OBJECTIVE:   Physical Exam:  General: AAOx3, NAD, lying comfortably in bed  HEENT: NC/AT  Respiratory: nonlabored breathing  Cardiovascular: RRR, normal S1 and S2, no murmurs or gallops  Abdomen: non-distended, soft, non-tender  Extremities: WWP  Drain: left apical chest tube with air leak (750ml/24hr)  --------------------------------------------------------------------------------------------------  V/S:  Vital Signs Last 24 Hrs  T(C): 36.7 (19 Jan 2020 08:00), Max: 37.5 (19 Jan 2020 03:00)  T(F): 98.1 (19 Jan 2020 08:00), Max: 99.5 (19 Jan 2020 03:00)  HR: 84 (19 Jan 2020 08:00) (59 - 88)  BP: 87/54 (19 Jan 2020 03:00) (87/54 - 87/54)  BP(mean): 66 (19 Jan 2020 03:00) (66 - 66)  RR: 28 (19 Jan 2020 08:00) (9 - 35)  SpO2: 97% (19 Jan 2020 08:00) (95% - 100%)    --------------------------------------------------------------------------------------------------  I/Os:    18 Jan 2020 07:01  -  19 Jan 2020 07:00  --------------------------------------------------------  IN:    lactated ringers.: 140 mL    Oral Fluid: 740 mL    Solution: 50 mL    Solution: 725 mL  Total IN: 1655 mL    OUT:    Chest Tube: 150 mL    Chest Tube: 750 mL    Ileostomy: 2700 mL    Intermittent Catheterization - Urethral: 700 mL    Voided: 50 mL  Total OUT: 4350 mL    Total NET: -2695 mL        --------------------------------------------------------------------------------------------------  LABS:                        7.3    9.79  )-----------( 253      ( 19 Jan 2020 01:02 )             24.1     19 Jan 2020 01:02    140    |  99     |  9      ----------------------------<  124    4.1     |  33     |  0.46     Ca    7.4        19 Jan 2020 01:02  Phos  2.2       19 Jan 2020 01:02  Mg     1.7       19 Jan 2020 01:02    TPro  4.9    /  Alb  2.0    /  TBili  0.7    /  DBili  0.4    /  AST  10     /  ALT  14     /  AlkPhos  53     19 Jan 2020 01:02    PT/INR - ( 18 Jan 2020 01:13 )   PT: 14.7 sec;   INR: 1.28 ratio         PTT - ( 18 Jan 2020 01:13 )  PTT:44.4 sec  CAPILLARY BLOOD GLUCOSE      POCT Blood Glucose.: 138 mg/dL (19 Jan 2020 05:33)  POCT Blood Glucose.: 93 mg/dL (18 Jan 2020 23:06)  POCT Blood Glucose.: 164 mg/dL (18 Jan 2020 17:45)  POCT Blood Glucose.: 122 mg/dL (18 Jan 2020 12:48)        LIVER FUNCTIONS - ( 19 Jan 2020 01:02 )  Alb: 2.0 g/dL / Pro: 4.9 g/dL / ALK PHOS: 53 U/L / ALT: 14 U/L / AST: 10 U/L / GGT: x               --------------------------------------------------------------------------------------------------  MEDICATIONS  (STANDING):  acetaminophen   Tablet .. 975 milliGRAM(s) Oral every 6 hours  albuterol/ipratropium for Nebulization. 3 milliLiter(s) Nebulizer every 6 hours  buDESOnide    Inhalation Suspension 0.5 milliGRAM(s) Inhalation every 12 hours  chlorhexidine 2% Cloths 1 Application(s) Topical <User Schedule>  diphenoxylate/atropine 2 Tablet(s) Oral <User Schedule>  enoxaparin Injectable 40 milliGRAM(s) SubCutaneous daily  loperamide 16 milliGRAM(s) Oral <User Schedule>  octreotide  Injectable 100 MICROGram(s) SubCutaneous three times a day  opium Tincture 6 milliGRAM(s) Oral <User Schedule>  pantoprazole    Tablet 40 milliGRAM(s) Oral before breakfast  piperacillin/tazobactam IVPB.. 4.5 Gram(s) IV Intermittent every 8 hours  psyllium Powder 1 Packet(s) Oral three times a day    MEDICATIONS  (PRN):  ondansetron Injectable 4 milliGRAM(s) IV Push every 6 hours PRN Nausea and/or Vomiting

## 2020-01-19 NOTE — PROGRESS NOTE ADULT - ASSESSMENT
75 y/o M presenting with septic shock and high grade SBO s/p exploratory laparotomy, lysis of adhesions, decompression of bowel via enterotomy w/ primary repair, and Abthera VAC placement on 12/6; s/p take down of Abthera, washout and re-application of the Abthera vac on 12/8. Now s/p SBR and end ileostomy on 12/10 acute respiratory distress now improving, Pneumothorax, hyperglycemia, delirium. Now still with persistent pneumothorax when chest pigtail clamped.   12/23 VSS on room air recommending IR drainage of left chest  12/24 No evidence of air leak to left chest tube. Tube clamped. Repeat chest cxr in 4 hours. Anticipate chest tube removal if lung remains expanded. Drainage of loculated left effusion discussed with IR. IR to reconsult for drainage once initial tube removed. Discussed plan with Dr Maeys and IR Fellow  12/24  On NC ,    VSSchest xray sm lt pl eff, left chest tube dc'd.  12/26    2l NC   co  sob,  lt side diminshed  12/27 VSS, CXR with unchanged loculated left pleural effusion- IR consulted for pigtail placement, states effusion can be drained via thoracentesis, pigtail placement not indicated at this time - Dr. Mayes to speak with IR attending.   12/30 Patient with persistent PTX  and left pleural effusion now for IR drainage with Dr Elkins.   12/31 HD stable, L PTC w/ high output, serosanguinous drainage, 990 ml overnight/ 2200 in 24 hrs. Tolerating 2L NC supplemental O2, Cyto pending, continue monitor drainage output.   1/1: LPTC still with High output-400/24h. Continue with pigtail cath drain.  1/ 2     lt pigtail draining  on lws  1/3 HD stable, left pigtail catheter continues to drain. Incentive spirometer encouraged, OOB and mobility, continue pulmonary toileting. Maintain chest tube to suction and monitor output.   1/4: Chest tube milked to prevent clogging of tube, Continues to drain with high out put.  1/5 remains with high out put 170/700. Continue drainage  1/6  persistent drainage from lt pl tube  1/7 Left pigtail 150/450. Daily CXR. Planning for pleurex cath placement on Friday 1/10 w/ Dr. Mayes  1/8        Lt pleural tube   persistent draining    1/9 preop for pleurX placement tomorrow. CT clamped. NPO after MN, type and cross x2.   1/10 s/p VATS L pleurx cath placement   1/11 HD stable, no resp distress, maintain L pleurex to suction overnight, CXR in AM.   1/12 pleurex waterseal  1/13   RRT  called for respiratory distress   plx to pleurvac   back to lws   min drainage  no a/l  1/14 VSS, Left pleurx catheter minimal drainage 10ml/24h, CXR: small left pleural effusion with left pleurx catheter in place.     1/15 Overnight episode of bradycardia and respiratory distress that stabilized with BiPap support, concerned for anemia requiring 2 U PRBC and increase in left pleural effusion, minimal drainage in pleurx.  1/15 s/p bronch, Left VATS. Evacuation of 2L blood from L chest, pleurX removal, chest ubes x2  1/16 HD stable, minimal vasopressors, CXR stable, currently SBT, CT x2 remains to suction, draining.   1/17 HD stable, CXR stable, will water seal chest tubes today, repeat CXR in AM. Cont to monitor drainage.   1/18: L apical chest tube with small AL. CXr appear stable tiny left apical PTX seen on cxr. Continue chest tube to water seal for now as d/w Dr. Mayes today.   1/19 Remains with high out put from left apical. Will continue both chest tubes for today as d/w Dr. Mayes/Luis

## 2020-01-19 NOTE — PROGRESS NOTE ADULT - SUBJECTIVE AND OBJECTIVE BOX
Cardiovascular Disease Progress Note    Overnight events: No acute events overnight. Mr. Hernandez is breathing comfortably on room air. He denies pain.   Otherwise review of systems negative    Objective Findings:  T(C): 36.7 (20 @ 08:00), Max: 37.5 (20 @ 03:00)  HR: 84 (20 @ 08:00) (59 - 88)  BP: 87/54 (20 @ 03:00) (87/54 - 87/54)  RR: 28 (20 @ 08:00) (9 - 35)  SpO2: 97% (20 @ 08:00) (95% - 100%)  Wt(kg): --  Daily     Daily Weight in k.1 (2020 00:17)      Physical Exam:  Gen: NAD; Patient resting comfortably  HEENT: EOMI, Normocephalic/ atraumatic  CV: RRR, normal S1 + S2, no m/r/g  Lungs:  Normal respiratory effort; clear to auscultation bilaterally  Abd: soft, non-tender; bowel sounds present  Ext: No edema; warm and well perfused    Telemetry: Sinus    Laboratory Data:                        7.3    9.79  )-----------( 253      ( 2020 01:02 )             24.1         140  |  99  |  9   ----------------------------<  124<H>  4.1   |  33<H>  |  0.46<L>    Ca    7.4<L>      2020 01:02  Phos  2.2       Mg     1.7         TPro  4.9<L>  /  Alb  2.0<L>  /  TBili  0.7  /  DBili  0.4<H>  /  AST  10  /  ALT  14  /  AlkPhos  53      PT/INR - ( 2020 01:13 )   PT: 14.7 sec;   INR: 1.28 ratio         PTT - ( 2020 01:13 )  PTT:44.4 sec          Inpatient Medications:  MEDICATIONS  (STANDING):  acetaminophen   Tablet .. 975 milliGRAM(s) Oral every 6 hours  albuterol/ipratropium for Nebulization. 3 milliLiter(s) Nebulizer every 6 hours  buDESOnide    Inhalation Suspension 0.5 milliGRAM(s) Inhalation every 12 hours  chlorhexidine 2% Cloths 1 Application(s) Topical <User Schedule>  diphenoxylate/atropine 2 Tablet(s) Oral <User Schedule>  enoxaparin Injectable 40 milliGRAM(s) SubCutaneous daily  loperamide 16 milliGRAM(s) Oral <User Schedule>  octreotide  Injectable 100 MICROGram(s) SubCutaneous three times a day  opium Tincture 6 milliGRAM(s) Oral <User Schedule>  pantoprazole    Tablet 40 milliGRAM(s) Oral before breakfast  piperacillin/tazobactam IVPB.. 4.5 Gram(s) IV Intermittent every 8 hours  psyllium Powder 1 Packet(s) Oral three times a day      Assessment: 74M  s/p ex lap, MIRYAM, closure of enterotomy, abthera vac placement     Problem/Plan - 1:  ·  Problem: Small bowel obstruction.  Plan: s/p ex lap, MIRYAM, closure of enterotomy, abthera vac placement  s/p washout  s/p PICC line placement . On TPN.      Problem/Plan - 2:  ·  Problem: Bradycardia.    Plan: No further episodes of junctional rhythm after review of telemetry strips.   Continue off AV thomas blockers.      Problem/Plan - 3:  ·  Problem: Pneumothorax.  Plan: stable  s/p VATS on 1/15.       Rest of care as per SICU.     Over 25 minutes spent on total encounter; more than 50% of the visit was spent counseling and/or coordinating care by the attending physician.      Juan Carlos Mascorro MD [facility]  Cardiovascular Disease  [phone]

## 2020-01-20 LAB
A-TOCOPHEROL VIT E SERPL-MCNC: 9.2 MG/L — SIGNIFICANT CHANGE UP (ref 9–29)
ALBUMIN SERPL ELPH-MCNC: 2 G/DL — LOW (ref 3.3–5)
ALP SERPL-CCNC: 56 U/L — SIGNIFICANT CHANGE UP (ref 40–120)
ALT FLD-CCNC: 13 U/L — SIGNIFICANT CHANGE UP (ref 10–45)
ANION GAP SERPL CALC-SCNC: 5 MMOL/L — SIGNIFICANT CHANGE UP (ref 5–17)
AST SERPL-CCNC: 9 U/L — LOW (ref 10–40)
BETA+GAMMA TOCOPHEROL SERPL-MCNC: 0.2 MG/L — LOW (ref 0.5–4.9)
BILIRUB DIRECT SERPL-MCNC: 0.2 MG/DL — SIGNIFICANT CHANGE UP (ref 0–0.2)
BILIRUB INDIRECT FLD-MCNC: 0.3 MG/DL — SIGNIFICANT CHANGE UP (ref 0.2–1)
BILIRUB SERPL-MCNC: 0.5 MG/DL — SIGNIFICANT CHANGE UP (ref 0.2–1.2)
BUN SERPL-MCNC: 8 MG/DL — SIGNIFICANT CHANGE UP (ref 7–23)
CALCIUM SERPL-MCNC: 7.4 MG/DL — LOW (ref 8.4–10.5)
CHLORIDE SERPL-SCNC: 98 MMOL/L — SIGNIFICANT CHANGE UP (ref 96–108)
CO2 SERPL-SCNC: 33 MMOL/L — HIGH (ref 22–31)
CREAT SERPL-MCNC: 0.5 MG/DL — SIGNIFICANT CHANGE UP (ref 0.5–1.3)
CULTURE RESULTS: SIGNIFICANT CHANGE UP
FERRITIN SERPL-MCNC: 511 NG/ML — HIGH (ref 30–400)
FOLATE SERPL-MCNC: 15 NG/ML — SIGNIFICANT CHANGE UP
GLUCOSE SERPL-MCNC: 133 MG/DL — HIGH (ref 70–99)
HCT VFR BLD CALC: 22.8 % — LOW (ref 39–50)
HGB BLD-MCNC: 7.2 G/DL — LOW (ref 13–17)
IRON SATN MFR SERPL: 19 % — SIGNIFICANT CHANGE UP (ref 16–55)
IRON SATN MFR SERPL: 26 UG/DL — LOW (ref 45–165)
MAGNESIUM SERPL-MCNC: 1.7 MG/DL — SIGNIFICANT CHANGE UP (ref 1.6–2.6)
MCHC RBC-ENTMCNC: 31.2 PG — SIGNIFICANT CHANGE UP (ref 27–34)
MCHC RBC-ENTMCNC: 31.6 GM/DL — LOW (ref 32–36)
MCV RBC AUTO: 98.7 FL — SIGNIFICANT CHANGE UP (ref 80–100)
NRBC # BLD: 0 /100 WBCS — SIGNIFICANT CHANGE UP (ref 0–0)
PHOSPHATE SERPL-MCNC: 2.2 MG/DL — LOW (ref 2.5–4.5)
PLATELET # BLD AUTO: 264 K/UL — SIGNIFICANT CHANGE UP (ref 150–400)
POTASSIUM SERPL-MCNC: 4.1 MMOL/L — SIGNIFICANT CHANGE UP (ref 3.5–5.3)
POTASSIUM SERPL-SCNC: 4.1 MMOL/L — SIGNIFICANT CHANGE UP (ref 3.5–5.3)
PROT SERPL-MCNC: 4.9 G/DL — LOW (ref 6–8.3)
RBC # BLD: 2.31 M/UL — LOW (ref 4.2–5.8)
RBC # FLD: 13.8 % — SIGNIFICANT CHANGE UP (ref 10.3–14.5)
SODIUM SERPL-SCNC: 136 MMOL/L — SIGNIFICANT CHANGE UP (ref 135–145)
SPECIMEN SOURCE: SIGNIFICANT CHANGE UP
TIBC SERPL-MCNC: 135 UG/DL — LOW (ref 220–430)
UIBC SERPL-MCNC: 109 UG/DL — LOW (ref 110–370)
VIT B12 SERPL-MCNC: 1092 PG/ML — SIGNIFICANT CHANGE UP (ref 232–1245)
WBC # BLD: 8.55 K/UL — SIGNIFICANT CHANGE UP (ref 3.8–10.5)
WBC # FLD AUTO: 8.55 K/UL — SIGNIFICANT CHANGE UP (ref 3.8–10.5)

## 2020-01-20 PROCEDURE — 99233 SBSQ HOSP IP/OBS HIGH 50: CPT | Mod: GC

## 2020-01-20 PROCEDURE — 99232 SBSQ HOSP IP/OBS MODERATE 35: CPT

## 2020-01-20 PROCEDURE — 99024 POSTOP FOLLOW-UP VISIT: CPT

## 2020-01-20 PROCEDURE — 71045 X-RAY EXAM CHEST 1 VIEW: CPT | Mod: 26

## 2020-01-20 RX ORDER — MAGNESIUM SULFATE 500 MG/ML
2 VIAL (ML) INJECTION ONCE
Refills: 0 | Status: COMPLETED | OUTPATIENT
Start: 2020-01-20 | End: 2020-01-20

## 2020-01-20 RX ADMIN — OCTREOTIDE ACETATE 100 MICROGRAM(S): 200 INJECTION, SOLUTION INTRAVENOUS; SUBCUTANEOUS at 23:51

## 2020-01-20 RX ADMIN — Medication 975 MILLIGRAM(S): at 18:55

## 2020-01-20 RX ADMIN — Medication 16 MILLIGRAM(S): at 18:22

## 2020-01-20 RX ADMIN — Medication 2 TABLET(S): at 08:45

## 2020-01-20 RX ADMIN — Medication 975 MILLIGRAM(S): at 13:51

## 2020-01-20 RX ADMIN — CHLORHEXIDINE GLUCONATE 1 APPLICATION(S): 213 SOLUTION TOPICAL at 05:12

## 2020-01-20 RX ADMIN — TAMSULOSIN HYDROCHLORIDE 0.4 MILLIGRAM(S): 0.4 CAPSULE ORAL at 14:00

## 2020-01-20 RX ADMIN — ENOXAPARIN SODIUM 40 MILLIGRAM(S): 100 INJECTION SUBCUTANEOUS at 13:50

## 2020-01-20 RX ADMIN — Medication 2 TABLET(S): at 18:21

## 2020-01-20 RX ADMIN — Medication 975 MILLIGRAM(S): at 14:30

## 2020-01-20 RX ADMIN — Medication 975 MILLIGRAM(S): at 05:12

## 2020-01-20 RX ADMIN — OCTREOTIDE ACETATE 100 MICROGRAM(S): 200 INJECTION, SOLUTION INTRAVENOUS; SUBCUTANEOUS at 05:12

## 2020-01-20 RX ADMIN — Medication 250 MILLIMOLE(S): at 05:11

## 2020-01-20 RX ADMIN — Medication 975 MILLIGRAM(S): at 18:20

## 2020-01-20 RX ADMIN — Medication 1 PACKET(S): at 23:27

## 2020-01-20 RX ADMIN — MEROPENEM 100 MILLIGRAM(S): 1 INJECTION INTRAVENOUS at 13:51

## 2020-01-20 RX ADMIN — SODIUM CHLORIDE 5 MILLILITER(S): 9 INJECTION INTRAMUSCULAR; INTRAVENOUS; SUBCUTANEOUS at 18:22

## 2020-01-20 RX ADMIN — Medication 16 MILLIGRAM(S): at 08:46

## 2020-01-20 RX ADMIN — Medication 3 MILLILITER(S): at 17:38

## 2020-01-20 RX ADMIN — Medication 250 MILLIMOLE(S): at 06:16

## 2020-01-20 RX ADMIN — MORPHINE 6 MILLIGRAM(S): 10 SOLUTION ORAL at 13:53

## 2020-01-20 RX ADMIN — Medication 975 MILLIGRAM(S): at 05:42

## 2020-01-20 RX ADMIN — Medication 3 MILLILITER(S): at 11:09

## 2020-01-20 RX ADMIN — Medication 2 TABLET(S): at 13:50

## 2020-01-20 RX ADMIN — Medication 975 MILLIGRAM(S): at 23:27

## 2020-01-20 RX ADMIN — Medication 1 PACKET(S): at 14:01

## 2020-01-20 RX ADMIN — MEROPENEM 100 MILLIGRAM(S): 1 INJECTION INTRAVENOUS at 23:27

## 2020-01-20 RX ADMIN — Medication 3 MILLILITER(S): at 05:29

## 2020-01-20 RX ADMIN — PANTOPRAZOLE SODIUM 40 MILLIGRAM(S): 20 TABLET, DELAYED RELEASE ORAL at 08:45

## 2020-01-20 RX ADMIN — MORPHINE 6 MILLIGRAM(S): 10 SOLUTION ORAL at 18:19

## 2020-01-20 RX ADMIN — Medication 0.5 MILLIGRAM(S): at 05:29

## 2020-01-20 RX ADMIN — Medication 16 MILLIGRAM(S): at 13:53

## 2020-01-20 RX ADMIN — MORPHINE 6 MILLIGRAM(S): 10 SOLUTION ORAL at 08:45

## 2020-01-20 RX ADMIN — Medication 1 PACKET(S): at 05:12

## 2020-01-20 RX ADMIN — Medication 0.5 MILLIGRAM(S): at 17:37

## 2020-01-20 RX ADMIN — SODIUM CHLORIDE 5 MILLILITER(S): 9 INJECTION INTRAMUSCULAR; INTRAVENOUS; SUBCUTANEOUS at 06:16

## 2020-01-20 RX ADMIN — MEROPENEM 100 MILLIGRAM(S): 1 INJECTION INTRAVENOUS at 05:11

## 2020-01-20 RX ADMIN — Medication 975 MILLIGRAM(S): at 23:57

## 2020-01-20 RX ADMIN — OCTREOTIDE ACETATE 100 MICROGRAM(S): 200 INJECTION, SOLUTION INTRAVENOUS; SUBCUTANEOUS at 13:53

## 2020-01-20 RX ADMIN — MORPHINE 6 MILLIGRAM(S): 10 SOLUTION ORAL at 23:26

## 2020-01-20 RX ADMIN — Medication 16 MILLIGRAM(S): at 23:29

## 2020-01-20 RX ADMIN — Medication 2 TABLET(S): at 23:29

## 2020-01-20 RX ADMIN — Medication 50 GRAM(S): at 04:00

## 2020-01-20 NOTE — PROGRESS NOTE ADULT - SUBJECTIVE AND OBJECTIVE BOX
SICU DAILY PROGRESS NOTE    75y Male abdominal pain, nausea, hematemesis, and poor PO intake for ~2-3 days. Labs significant for an CHI w/ Cr 2.74 and lactate of 9.4. He was also notably tachycardic to the 110s and hypotensive w/ SBP in the 70s. He was given 2 units of PRBCs and 2 L of LR in the ED due to concern for upper GI bleeding. Imaging revealed high grade SBO in the RLQ. Patient was taken to the OR emergently for an exploratory laparotomy, lysis of adhesions, decompression of bowel via enterotomy w/ primary repair, and Abthera VAC placement. Of note, the distal 50% of the bowel appeared dusky but viable. He required vasopressor support with phenylephrine and vasopressin infusions. He received 3000 mL of crystalloid w/ EBL of 10 mL and UOP of 25 mL. Patient was left intubated at the end of the case so SICU was consulted for hemodynamic monitoring. Taken back to the OR on 12/8 and underwent take down of Abthera, washout and re-application of the Abthera vac. Went back to OR on 12/10 night for colectomy. Post-op course complicated by short gut syndrome with high ileostomy output as well as left pleural effusion requiring IR guided pigtail catheter placement 12/30. Patient then had VATS on left side for PleurX catheter placement. 01/10.       24 HOUR EVENTS:  - started on 1:1/4 repletions for high ileostomy output  - combicath resulted in pseudomonas resistant to Zosyn, so Abx switched to meropenem  - rosas catheter discontinued, failed trial of void, catheter reinserted  - ileostomy and pleurex output remains high    SUBJECTIVE/ROS:  [x] A ten-point review of systems was otherwise negative except as noted.  [ ] Due to altered mental status/intubation, subjective information were not able to be obtained from the patient. History was obtained, to the extent possible, from review of the chart and collateral sources of information.      NEURO  Exam: awake, alert, oriented  Meds: acetaminophen   Tablet .. 975 milliGRAM(s) Oral every 6 hours  ondansetron Injectable 4 milliGRAM(s) IV Push every 6 hours PRN Nausea and/or Vomiting  opium Tincture 6 milliGRAM(s) Oral <User Schedule>    [x] Adequacy of sedation and pain control has been assessed and adjusted    RESPIRATORY  RR: 15 (01-20-20 @ 00:00) (9 - 35)  SpO2: 99% (01-20-20 @ 00:17) (94% - 100%)  Wt(kg): --  Exam: unlabored, clear to auscultation bilaterally  Mechanical Ventilation:     [N/A] Extubation Readiness Assessed  Meds: albuterol/ipratropium for Nebulization. 3 milliLiter(s) Nebulizer every 6 hours  buDESOnide    Inhalation Suspension 0.5 milliGRAM(s) Inhalation every 12 hours    CARDIOVASCULAR  HR: 73 (01-20-20 @ 00:17) (59 - 87)  BP: 129/60 (01-19-20 @ 19:00) (87/54 - 129/60)  BP(mean): 87 (01-19-20 @ 19:00) (66 - 87)  ABP: 122/52 (01-20-20 @ 00:00) (86/39 - 150/65)  ABP(mean): 79 (01-20-20 @ 00:00) (57 - 101)    Exam: regular rate and rhythm  Cardiac Rhythm: sinus  Perfusion     [x]Adequate   [ ]Inadequate  Mentation   [x]Normal       [ ]Reduced  Extremities  [x]Warm         [ ]Cool  Volume Status [ ]Hypervolemic [x]Euvolemic [ ]Hypovolemic  Meds: tamsulosin 0.4 milliGRAM(s) Oral daily      GI/NUTRITION  Exam: soft, nontender, nondistended, incision C/D/I  Diet: mech soft  Meds: diphenoxylate/atropine 2 Tablet(s) Oral <User Schedule>  loperamide 16 milliGRAM(s) Oral <User Schedule>  pantoprazole    Tablet 40 milliGRAM(s) Oral before breakfast  psyllium Powder 1 Packet(s) Oral three times a day      GENITOURINARY  I&O's Detail    01-18 @ 07:01  -  01-19 @ 07:00  --------------------------------------------------------  IN:    lactated ringers.: 140 mL    Oral Fluid: 740 mL    Solution: 50 mL    Solution: 725 mL  Total IN: 1655 mL    OUT:    Chest Tube: 750 mL    Chest Tube: 150 mL    Ileostomy: 2700 mL    Intermittent Catheterization - Urethral: 700 mL    Voided: 50 mL  Total OUT: 4350 mL    Total NET: -2695 mL      01-19 @ 07:01  -  01-20 @ 00:56  --------------------------------------------------------  IN:    Oral Fluid: 600 mL    sodium chloride 0.9%.: 360 mL    Solution: 50 mL    Solution: 50 mL  Total IN: 1060 mL    OUT:    Chest Tube: 850 mL    Chest Tube: 150 mL    Ileostomy: 1700 mL    Indwelling Catheter - Urethral: 420 mL    Intermittent Catheterization - Urethral: 850 mL    Voided: 325 mL  Total OUT: 4295 mL    Total NET: -3235 mL          01-19    140  |  99  |  9   ----------------------------<  124<H>  4.1   |  33<H>  |  0.46<L>    Ca    7.4<L>      19 Jan 2020 01:02  Phos  2.2     01-19  Mg     1.7     01-19    TPro  4.9<L>  /  Alb  2.0<L>  /  TBili  0.7  /  DBili  0.4<H>  /  AST  10  /  ALT  14  /  AlkPhos  53  01-19    [x] Rosas catheter, indication: retention  Meds: ergocalciferol 99576 Unit(s) Oral every week  sodium chloride 0.9%. 1000 milliLiter(s) IV Continuous <Continuous>    HEMATOLOGIC  Meds: enoxaparin Injectable 40 milliGRAM(s) SubCutaneous daily    [x] VTE Prophylaxis                        7.2    8.55  )-----------( 264      ( 20 Jan 2020 00:41 )             22.8     PT/INR - ( 18 Jan 2020 01:13 )   PT: 14.7 sec;   INR: 1.28 ratio         PTT - ( 18 Jan 2020 01:13 )  PTT:44.4 sec  Transfusion     [ ] PRBC   [ ] Platelets   [ ] FFP   [ ] Cryoprecipitate      INFECTIOUS DISEASES  WBC Count: 8.55 K/uL (01-20 @ 00:41)  WBC Count: 9.79 K/uL (01-19 @ 01:02)    RECENT CULTURES:  Specimen Source: Bronch Wash Combicath  Date/Time: 01-16 @ 06:43  Culture Results:   Rare Pseudomonas aeruginosa  Gram Stain:   Moderate polymorphonuclear leukocytes per low power field  Few Squamous epithelial cells per low power field  Rare Gram positive cocci in pairs  Rare Gram Negative Rods  Organism: Pseudomonas aeruginosa  Specimen Source: .Blood Blood  Date/Time: 01-16 @ 02:02  Culture Results:   No growth to date.  Gram Stain: --  Organism: --  Specimen Source: .Blood Blood-Arterial  Date/Time: 01-15 @ 18:04  Culture Results:   No growth to date.  Gram Stain: --  Organism: --  Specimen Source: .Blood Blood-Peripheral  Date/Time: 01-15 @ 10:09  Culture Results:   Testing in progress    Meds: meropenem  IVPB 1000 milliGRAM(s) IV Intermittent every 8 hours    POCT Blood Glucose.: 138 mg/dL (19 Jan 2020 05:33)    Meds: octreotide  Injectable 100 MICROGram(s) SubCutaneous three times a day    ACCESS DEVICES:  [x] Peripheral IV  [ ] Central Venous Line	[ ] R	[ ] L	[ ] IJ	[ ] Fem	[ ] SC	Placed:   [ ] Arterial Line		[ ] R	[ ] L	[ ] Fem	[ ] Rad	[ ] Ax	Placed:   [ ] PICC:					[ ] Mediport  [ ] Urinary Catheter, Date Placed:   [x] Necessity of urinary, arterial, and venous catheters discussed    OTHER MEDICATIONS:  chlorhexidine 2% Cloths 1 Application(s) Topical <User Schedule>      CODE STATUS: full SICU DAILY PROGRESS NOTE    24 HOUR EVENTS:  - started on 1:1/4 repletions for high ileostomy output  - combicath resulted in pseudomonas resistant to Zosyn, so Abx switched to meropenem  - rosas catheter discontinued, failed trial of void, catheter reinserted  - ileostomy and pleurex output remains high    HISTORY:  74 y/o male with a past medical history of COPD and EtOH dependence who presented on 12/6/2019 with abdominal pain, nausea, hematemesis, and poor PO intake for ~2-3 days. In the ED, he was found to be hypotensive & tachycardic. Labs revealed an CHI and lactic acidosis. Imaging revealed a high grade SBO in the RLQ on CT scan. Hospital course is as follows:  12/06 - s/p exploratory laparotomy, lysis of adhesions, decompression of bowel via enterotomy w/ primary repair, and Abthera VAC placement as the distal 50% of the bowel appeared dusky but was still viable, admitted to SICU post-operatively as he was on vasopressor support and was left intubated  12/08 - s/p re-exploration, proximal 165 cm of small bowel appeared pink & viable but beyond that had patchy areas of ischemia so decision was made to give the bowel more time to demarcate before resecting in order to preserve as much small bowel as possible so Abthera VAC was replaced  12/09 - s/p re-exploration, small bowel resection of 150 cm (150 cm remaining), ileocecetomy, end ileostomy, mucous fistula, and abdominal closure  12/11 - extubated to BiPAP, noted to be in SVT that was rate controlled w/ metoprolol  12/14 - started on TPN  12/15 - noted to have spontaneous left pneumothorax s/p left pigtail catheter placement, noted to be in SVT again so amiodarone started, high ileostomy output noted so concern for short bowel syndrome  12/16 - amiodarone discontinued, started on beta blocker with metoprolol  12/18 - started Lomotil and ileostomy repletions with IV fluids  12/19 - started Imodium, left pigtail catheter was placed to water seal but pneumothorax noted on follow-up CXR so placed back to suction  12/20 - started tincture of opium, concern for aspiration so changed diet from regular to dysphagia 1 pureed with nectar-thickened liquids  12/22 - left pigtail catheter placed to water seal with no pneumothorax noted on follow-up CXR, CT chest with moderate left pleural effusion not being drained by pigtail catheter  12/23 - 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  12/24 - left pigtail catheter discontinued  12/28 - started Ancef for erythematous midline wound  12/29 - beta blocker discontinued for intermittent episodes of hypotension  12/30 - left pigtail catheter placement by IR with drainage of serous transudative fluid  01/02 - FEES demonstrating penetration with thin liquids so patient kept on dysphagia 1 pureed with nectar-thickened liquids  01/07 - Transferred to floors  01/10 - s/p left VATS w/ PleurX catheter placement  01/11 - evaluated by speech & swallow, advanced diet to mechanical soft with thin liquids  01/12 - PleurX catheter placed to water seal with on pneumothorax on follow-up CXR  01/13 - RRT for hypoxemia, placed on BiPAP, PleurX catheter placed back to suction, started vancomycin & Zosyn for possible HCAP  01/14 - noted to have minimal output from PleurX catheter, lung ultrasound with large left pleural effusion concerning for hemothorax, multiple episodes of bradycardia secondary to hypoxemia, remains on BiPAP  01/15 - worsening respiratory distress requiring intubation, hypotensive requiring vasopressor support, CT chest with large left hemothorax w/ trace pneumothorax & extensive subcutaneous emphysema so taken to OR emergently for left VATS, evacuation fo 2 L of clot, and placement of two left chest tubes  01/16 - extubated, weaned off vasopressor support  01/17 - two left chest tubes placed to water seal with no pneumothorax noted on follow-up CXR    SUBJECTIVE/ROS:  [x] A ten-point review of systems was otherwise negative except as noted.  [ ] Due to altered mental status/intubation, subjective information were not able to be obtained from the patient. History was obtained, to the extent possible, from review of the chart and collateral sources of information.      NEURO  Exam: awake, alert, oriented  Meds: acetaminophen   Tablet .. 975 milliGRAM(s) Oral every 6 hours  ondansetron Injectable 4 milliGRAM(s) IV Push every 6 hours PRN Nausea and/or Vomiting  opium Tincture 6 milliGRAM(s) Oral <User Schedule>    [x] Adequacy of sedation and pain control has been assessed and adjusted    RESPIRATORY  RR: 15 (01-20-20 @ 00:00) (9 - 35)  SpO2: 99% (01-20-20 @ 00:17) (94% - 100%)  Wt(kg): --  Exam: unlabored, clear to auscultation bilaterally  Mechanical Ventilation:     [N/A] Extubation Readiness Assessed  Meds: albuterol/ipratropium for Nebulization. 3 milliLiter(s) Nebulizer every 6 hours  buDESOnide    Inhalation Suspension 0.5 milliGRAM(s) Inhalation every 12 hours    CARDIOVASCULAR  HR: 73 (01-20-20 @ 00:17) (59 - 87)  BP: 129/60 (01-19-20 @ 19:00) (87/54 - 129/60)  BP(mean): 87 (01-19-20 @ 19:00) (66 - 87)  ABP: 122/52 (01-20-20 @ 00:00) (86/39 - 150/65)  ABP(mean): 79 (01-20-20 @ 00:00) (57 - 101)    Exam: regular rate and rhythm  Cardiac Rhythm: sinus  Perfusion     [x]Adequate   [ ]Inadequate  Mentation   [x]Normal       [ ]Reduced  Extremities  [x]Warm         [ ]Cool  Volume Status [ ]Hypervolemic [x]Euvolemic [ ]Hypovolemic  Meds: tamsulosin 0.4 milliGRAM(s) Oral daily      GI/NUTRITION  Exam: soft, nontender, nondistended, incision C/D/I  Diet: mech soft  Meds: diphenoxylate/atropine 2 Tablet(s) Oral <User Schedule>  loperamide 16 milliGRAM(s) Oral <User Schedule>  pantoprazole    Tablet 40 milliGRAM(s) Oral before breakfast  psyllium Powder 1 Packet(s) Oral three times a day      GENITOURINARY  I&O's Detail    01-18 @ 07:01  -  01-19 @ 07:00  --------------------------------------------------------  IN:    lactated ringers.: 140 mL    Oral Fluid: 740 mL    Solution: 50 mL    Solution: 725 mL  Total IN: 1655 mL    OUT:    Chest Tube: 750 mL    Chest Tube: 150 mL    Ileostomy: 2700 mL    Intermittent Catheterization - Urethral: 700 mL    Voided: 50 mL  Total OUT: 4350 mL    Total NET: -2695 mL      01-19 @ 07:01  -  01-20 @ 00:56  --------------------------------------------------------  IN:    Oral Fluid: 600 mL    sodium chloride 0.9%.: 360 mL    Solution: 50 mL    Solution: 50 mL  Total IN: 1060 mL    OUT:    Chest Tube: 850 mL    Chest Tube: 150 mL    Ileostomy: 1700 mL    Indwelling Catheter - Urethral: 420 mL    Intermittent Catheterization - Urethral: 850 mL    Voided: 325 mL  Total OUT: 4295 mL    Total NET: -3235 mL          01-19    140  |  99  |  9   ----------------------------<  124<H>  4.1   |  33<H>  |  0.46<L>    Ca    7.4<L>      19 Jan 2020 01:02  Phos  2.2     01-19  Mg     1.7     01-19    TPro  4.9<L>  /  Alb  2.0<L>  /  TBili  0.7  /  DBili  0.4<H>  /  AST  10  /  ALT  14  /  AlkPhos  53  01-19    [x] Rosas catheter, indication: retention  Meds: ergocalciferol 47008 Unit(s) Oral every week  sodium chloride 0.9%. 1000 milliLiter(s) IV Continuous <Continuous>    HEMATOLOGIC  Meds: enoxaparin Injectable 40 milliGRAM(s) SubCutaneous daily    [x] VTE Prophylaxis                        7.2    8.55  )-----------( 264      ( 20 Jan 2020 00:41 )             22.8     PT/INR - ( 18 Jan 2020 01:13 )   PT: 14.7 sec;   INR: 1.28 ratio         PTT - ( 18 Jan 2020 01:13 )  PTT:44.4 sec  Transfusion     [ ] PRBC   [ ] Platelets   [ ] FFP   [ ] Cryoprecipitate      INFECTIOUS DISEASES  WBC Count: 8.55 K/uL (01-20 @ 00:41)  WBC Count: 9.79 K/uL (01-19 @ 01:02)    RECENT CULTURES:  Specimen Source: Bronch Wash Combicath  Date/Time: 01-16 @ 06:43  Culture Results:   Rare Pseudomonas aeruginosa  Gram Stain:   Moderate polymorphonuclear leukocytes per low power field  Few Squamous epithelial cells per low power field  Rare Gram positive cocci in pairs  Rare Gram Negative Rods  Organism: Pseudomonas aeruginosa  Specimen Source: .Blood Blood  Date/Time: 01-16 @ 02:02  Culture Results:   No growth to date.  Gram Stain: --  Organism: --  Specimen Source: .Blood Blood-Arterial  Date/Time: 01-15 @ 18:04  Culture Results:   No growth to date.  Gram Stain: --  Organism: --  Specimen Source: .Blood Blood-Peripheral  Date/Time: 01-15 @ 10:09  Culture Results:   Testing in progress    Meds: meropenem  IVPB 1000 milliGRAM(s) IV Intermittent every 8 hours    POCT Blood Glucose.: 138 mg/dL (19 Jan 2020 05:33)    Meds: octreotide  Injectable 100 MICROGram(s) SubCutaneous three times a day    ACCESS DEVICES:  [x] Peripheral IV  [ ] Central Venous Line	[ ] R	[ ] L	[ ] IJ	[ ] Fem	[ ] SC	Placed:   [ ] Arterial Line		[ ] R	[ ] L	[ ] Fem	[ ] Rad	[ ] Ax	Placed:   [ ] PICC:					[ ] Mediport  [ ] Urinary Catheter, Date Placed:   [x] Necessity of urinary, arterial, and venous catheters discussed    OTHER MEDICATIONS:  chlorhexidine 2% Cloths 1 Application(s) Topical <User Schedule>      CODE STATUS: full

## 2020-01-20 NOTE — PROGRESS NOTE ADULT - SUBJECTIVE AND OBJECTIVE BOX
He is comfortable  Required BiPAP for a short time overnight  Currently denies any nausea, vomiting, fever or chills  States his breathing feels the same as yesterday  Martinez replaced    Vital Signs Last 24 Hrs  T(C): 37 (20 Jan 2020 03:00), Max: 37.4 (19 Jan 2020 23:00)  T(F): 98.6 (20 Jan 2020 03:00), Max: 99.3 (19 Jan 2020 23:00)  HR: 76 (20 Jan 2020 03:00) (62 - 87)  BP: 129/60 (19 Jan 2020 19:00) (129/60 - 129/60)  BP(mean): 87 (19 Jan 2020 19:00) (87 - 87)  RR: 17 (20 Jan 2020 03:00) (12 - 35)  SpO2: 100% (20 Jan 2020 03:00) (94% - 100%)    I&O's Detail    18 Jan 2020 07:01  -  19 Jan 2020 07:00  --------------------------------------------------------  IN:    lactated ringers.: 140 mL    Oral Fluid: 740 mL    Solution: 50 mL    Solution: 725 mL  Total IN: 1655 mL    OUT:    Chest Tube: 750 mL    Chest Tube: 150 mL    Ileostomy: 2700 mL    Intermittent Catheterization - Urethral: 700 mL    Voided: 50 mL  Total OUT: 4350 mL    Total NET: -2695 mL      19 Jan 2020 07:01  -  20 Jan 2020 05:02  --------------------------------------------------------  IN:    Oral Fluid: 600 mL    sodium chloride 0.9%.: 375 mL    Solution: 50 mL    Solution: 50 mL  Total IN: 1075 mL    OUT:    Chest Tube: 170 mL    Chest Tube: 950 mL    Ileostomy: 1700 mL    Indwelling Catheter - Urethral: 510 mL    Intermittent Catheterization - Urethral: 850 mL    Voided: 325 mL  Total OUT: 4505 mL    Total NET: -3430 mL        On exam: awake, alert and oriented  Supplemental O2 via NC  Left sided chest tubes with serosanguinous output  Abd is soft, not tender and not distended  Ileostomy is pink, viable and with 1700mL of recorded output

## 2020-01-20 NOTE — PROGRESS NOTE ADULT - ASSESSMENT
75M active smoker with COPD (not on home O2), active EtOH abuse admitted 12/6 with SBO.  Hospital course complicated by CHI, delirium, short gut syndrome, pneumothorax, episodes of hypotension, hypoxia, rash, anemia and transfusion requirements.      12/6/19 - OR for ex-lap, SANDRA and decompression enterotomy with primary repair - dusky area of bowel noted  12/8/19 - RTOR for washout, areas of ischemia of concern  12/10/19 - RTOR for partial colectomy, mucous fistula, end ileostomy  12/11/19 - extubated  12/15/19 - pneumothorax s/p L CT  12/30/19 - CT by IR  1/10/2020 - VATS with pleurex catheter insertion for drainage of pleural effusion.  1/10/2020 - rash - derm - miliaria rubra due to sweat trapping on the back  1/13/20/20 - RRT - hypoxia, hypotension, leukocytosis, no fever, drop in H/H  1/15/2020 - RTOR for VATS to evacuate hematoma        Overall, leukocytosis, elevated fungitell, pneumonia, pseudomonas infection.     Fungitell  - f/u fungal blood cultures  - hold on antifungals    Leukocytosis: resolved  - in setting of underlying pneumonia     pseudomonas Pneumonia  -switched to chad based on sensitivities.   -clinically improved.

## 2020-01-20 NOTE — PROGRESS NOTE ADULT - SUBJECTIVE AND OBJECTIVE BOX
SUBJECTIVE    OVERNIGHT EVENTS:  24 HOUR EVENTS:  Patient seen and examined.. Patient feels well,   pain controlled, denies f/c/n/v/d/sob, +flatus/BM    - started on 1:1/4 repletions for high ileostomy output  - combicath resulted in pseudomonas resistant to Zosyn, so Abx switched to meropenem  - rosas catheter discontinued, failed trial of void, catheter reinserted  - ileostomy and pleurex output remains high    10-point review of systems completed and negative except as noted above.      OBJECTIVE    MEDICATIONS  acetaminophen   Tablet .. 975 milliGRAM(s) Oral every 6 hours  albuterol/ipratropium for Nebulization. 3 milliLiter(s) Nebulizer every 6 hours  buDESOnide    Inhalation Suspension 0.5 milliGRAM(s) Inhalation every 12 hours  chlorhexidine 2% Cloths 1 Application(s) Topical <User Schedule>  diphenoxylate/atropine 2 Tablet(s) Oral <User Schedule>  enoxaparin Injectable 40 milliGRAM(s) SubCutaneous daily  ergocalciferol 33315 Unit(s) Oral every week  loperamide 16 milliGRAM(s) Oral <User Schedule>  meropenem  IVPB 1000 milliGRAM(s) IV Intermittent every 8 hours  octreotide  Injectable 100 MICROGram(s) SubCutaneous three times a day  ondansetron Injectable 4 milliGRAM(s) IV Push every 6 hours PRN  opium Tincture 6 milliGRAM(s) Oral <User Schedule>  pantoprazole    Tablet 40 milliGRAM(s) Oral before breakfast  psyllium Powder 1 Packet(s) Oral three times a day  sodium chloride 0.9%. 1000 milliLiter(s) IV Continuous <Continuous>  tamsulosin 0.4 milliGRAM(s) Oral daily      PHYSICAL EXAM  T(C): 37 (01-20-20 @ 03:00), Max: 37.4 (01-19-20 @ 23:00)  HR: 77 (01-20-20 @ 07:00) (62 - 94)  BP: 129/60 (01-19-20 @ 19:00) (129/60 - 129/60)  RR: 18 (01-20-20 @ 07:00) (12 - 28)  SpO2: 100% (01-20-20 @ 07:00) (94% - 100%)    01-19-20 @ 07:01  -  01-20-20 @ 07:00  --------------------------------------------------------  IN: 1878 mL / OUT: 5450 mL / NET: -3572 mL        General: Appears well, NAD  Neuro: AAOx3  CHEST: Clear to auscultation bilaterally, chest tubes in place to water suction  CV: Regular rate and rhythm  Abdomen: soft, nontender, nondistended, no rebound or guarding, ostomy output high, urine clear yellow,   Extremities: Grossly symmetric    LABS                        7.2    8.55  )-----------( 264      ( 20 Jan 2020 00:41 )             22.8     01-20    136  |  98  |  8   ----------------------------<  133<H>  4.1   |  33<H>  |  0.50    Ca    7.4<L>      20 Jan 2020 00:41  Phos  2.2     01-20  Mg     1.7     01-20    TPro  4.9<L>  /  Alb  2.0<L>  /  TBili  0.5  /  DBili  0.2  /  AST  9<L>  /  ALT  13  /  AlkPhos  56  01-20          RADIOLOGY & ADDITIONAL STUDIES

## 2020-01-20 NOTE — PROGRESS NOTE ADULT - ASSESSMENT
76 y/o M presenting with septic shock and high grade SBO s/p exploratory laparotomy, lysis of adhesions, decompression of bowel via enterotomy w/ primary repair, and Abthera VAC placement on 12/6; s/p take down of Abthera, washout and re-application of the Abthera vac on 12/8. 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 s/p L chest tube placement by IR 12/30 for pleural effusion now s/p VATS, with chest tube insertion for drainage of pleural effusion. RRT called 1/13 AM for hypoxia, patient transferred back to SICU.  Patient continued SOB, chest drain possible obstruction expanding. Patent taken back to OR emergently 1/15 for clot evac and VATS, TPN stopped due to contaminated port    - TPN port of PICC was contaminated 1/15 when fluid resuscitated.  Per SICU, plan is to change PICC today with dedicated port for TPN  - calorie count to be completed today.  Continue diet with supplements  - monitor ostomy output, anti motility agents per SICU and primary team  - Fe studies show anemia of chronic disease          TPN pager [phone], spectra [phone]  discussed with Dr. Chacko and Dr. MELISSA Siegel 76 y/o M presenting with septic shock and high grade SBO s/p exploratory laparotomy, lysis of adhesions, decompression of bowel via enterotomy w/ primary repair, and Abthera VAC placement on 12/6; s/p take down of Abthera, washout and re-application of the Abthera vac on 12/8. 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 s/p L chest tube placement by IR 12/30 for pleural effusion now s/p VATS, with chest tube insertion for drainage of pleural effusion. RRT called 1/13 AM for hypoxia, patient transferred back to SICU.  Patient continued SOB, chest drain possible obstruction expanding. Patent taken back to OR emergently 1/15 for clot evac and VATS, TPN stopped due to contaminated port    - TPN port of PICC was contaminated 1/15 when fluid resuscitated.  Pt will need PICC replaced prior to restarting TPN.  - calorie count to be completed today.  Continue diet with supplements  - monitor ostomy output, anti motility agents per SICU and primary team  - Fe studies show anemia of chronic disease      TPN pager [phone], spectra [phone]  discussed with Dr. Chacko and Dr. MELISSA Siegel 76 y/o M presenting with septic shock and high grade SBO s/p exploratory laparotomy, lysis of adhesions, decompression of bowel via enterotomy w/ primary repair, and Abthera VAC placement on 12/6; s/p take down of Abthera, washout and re-application of the Abthera vac on 12/8. 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 s/p L chest tube placement by IR 12/30 for pleural effusion now s/p VATS, with chest tube insertion for drainage of pleural effusion. RRT called 1/13 AM for hypoxia, patient transferred back to SICU.  Patient continued SOB, chest drain possible obstruction expanding. Patent taken back to OR emergently 1/15 for clot evac and VATS, TPN stopped due to contaminated port    - TPN port of PICC was contaminated 1/15 when fluid resuscitated.  Pt will need PICC replaced prior to restarting TPN.  - calorie count to be completed today.  Continue diet with supplements  - Check albumin/prealbumin in AM to help assess nutritional status while off TPN  - monitor ostomy output, anti motility agents per SICU and primary team  - care per SICU      TPN pager [phone], spectra [phone]  discussed with Dr. Chacko and Dr. MELISSA Siegel

## 2020-01-20 NOTE — PROGRESS NOTE ADULT - ASSESSMENT
76 y/o M presenting with septic shock and high grade SBO s/p exploratory laparotomy, lysis of adhesions, decompression of bowel via enterotomy w/ primary repair, and Abthera VAC placement on 12/6; s/p take down of Abthera, washout and re-application of the Abthera vac on 12/8. 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 s/p L chest tube placement by IR 12/30 for pleural effusion now s/p VATS, with chest tube insertion for drainage of pleural effusion. RRT called 1/13 AM for hypoxia, patient transferred back to SICU.  Patient continued SOB, chest drain possible obstruction expanding. Patent taken back to OR emergently 1/15 for clot evac and VATS.     - Appreciate Continued SICU care  - Diet as tolerated  - Continue Lomotil, loperamide, tincture of opium, octreotide  - Follow up CT surgery regarding chest tubes  - F/U TPN

## 2020-01-20 NOTE — PROGRESS NOTE ADULT - ASSESSMENT
75 y/o male presenting with high grade SBO s/p exploratory laparotomy, lysis of adhesions, decompression of bowel via enterotomy w/ primary repair, & Abthera VAC placement (12/06/2019); RTOR for re-exploration w/ Abthera VAC replacement (12/08/2019) to allow for demarcation of ischemic small bowel; RTOR for re-exploration, small bowel resection of 150 cm (150 cm remaining), ileocecetomy, end ileostomy, mucous fistula, and abdominal closure (12/10/2019); hospital course complicated by SVT, short bowel syndrome requiring repletions w/ IV fluids, malnutrition requiring TPN, intermittent episodes of hypotension, spontaneous left pneumothorax s/p pigtail catheter (12/15/2019-12/24/2019), left pleural effusion s/p pigtail catheter w/ IR (12/30/2019), dysphagia, and oral HSV lesions, placement of Pleurx on 1/10, now re-admitted to SICU with acute hypoxic respiratory failure.     Neuro: no acute issues  - Pain control as needed with acetaminophen    Resp: COPD, spontaneous left pneumothorax s/p pigtail catheter, left pleural effusion s/p Pleurx 1/10.  Intubated for respiratory distress s/p VATS 01/15 with 2 Liter of blood evacuation   - Cont chest tubes to waterseal, Follow up AM CXR.   - Pulmicort and Duoneb for COPD  - Will need outpatient follow-up with a pulmonologist for his COPD as patient does not currently have one  - Follow up CXR    CV: Bradycardia, SVT.  - No interval dysrhythmias   - Monitor vital signs    GI: s/p exploratory laparotomy, [place], decompression of bowel via enterotomy w/ primary repair, & Abthera VAC placement (12/06/2019); re-exploration w/ ABthera VAC replacement (12/08/2019); re-exploration, small bowel resection of 150 cm (150 cm remaining), ileocecectomy end ileostomy, mucous fistula, and abdominal closure (12/10/2019); short bowel syndrome, malnutrition, dysphagia  - Dysphagia diet   - Continue Lomotil, tincture of opium, loperamide, and octreotide for short bowel syndrome  - Protonix to decrease gastric secretions  - Continue 0.25/1 ostomy repletions    Renal: no acute issues  - Monitor I&Os  - Monitor electrolytes and replete as necessary  - rosas catheter in place    Heme: anemia likely secondary to malnutrition / malabsorption  - Monitor CBC and coags  - Q12 CBCs   - Follow up anemia labs  - Lovenox for VTE prophylaxis    ID: oral HSV lesions (s/p acyclovir 12/23/2019-01/02/2020)  - Fungitell elevated: HIV testing, LDH pending.   - Combicath 1/16 with pseudomonas resistant to Zosyn  - Blood cx 1/16 NGTD.   - Meropenem    Endo: no acute issues  - Monitor glucose before meals & at bedtime while on TPN  - Insulin sliding scale.    Disposition:  - Will remain in SICU

## 2020-01-20 NOTE — PROGRESS NOTE ADULT - SUBJECTIVE AND OBJECTIVE BOX
Cardiovascular Disease Progress Note    Overnight events: No acute events overnight. Mr. Hernandez is breathing comfortably on nasal cannula.  Otherwise review of systems negative    Objective Findings:  T(C): 37 (20 @ 08:00), Max: 37.4 (20 @ 23:00)  HR: 84 (20 @ 10:00) (62 - 94)  BP: 129/60 (20 @ 19:00) (129/60 - 129/60)  RR: 22 (20 @ 10:00) (12 - 37)  SpO2: 99% (20 @ 10:00) (91% - 100%)  Wt(kg): --  Daily     Daily Weight in k.9 (2020 03:04)      Physical Exam:  Gen: NAD; Patient resting comfortably  HEENT: EOMI, Normocephalic/ atraumatic  CV: RRR, normal S1 + S2, no m/r/g  Lungs:  Normal respiratory effort; clear to auscultation bilaterally  Abd: soft, non-tender; bowel sounds present  Ext: No edema; warm and well perfused    Telemetry: Sinus    Laboratory Data:                        7.2    8.55  )-----------( 264      ( 2020 00:41 )             22.8         136  |  98  |  8   ----------------------------<  133<H>  4.1   |  33<H>  |  0.50    Ca    7.4<L>      2020 00:41  Phos  2.2       Mg     1.7         TPro  4.9<L>  /  Alb  2.0<L>  /  TBili  0.5  /  DBili  0.2  /  AST  9<L>  /  ALT  13  /  AlkPhos  56                Inpatient Medications:  MEDICATIONS  (STANDING):  acetaminophen   Tablet .. 975 milliGRAM(s) Oral every 6 hours  albuterol/ipratropium for Nebulization. 3 milliLiter(s) Nebulizer every 6 hours  buDESOnide    Inhalation Suspension 0.5 milliGRAM(s) Inhalation every 12 hours  chlorhexidine 2% Cloths 1 Application(s) Topical <User Schedule>  diphenoxylate/atropine 2 Tablet(s) Oral <User Schedule>  enoxaparin Injectable 40 milliGRAM(s) SubCutaneous daily  ergocalciferol 62723 Unit(s) Oral every week  loperamide 16 milliGRAM(s) Oral <User Schedule>  meropenem  IVPB 1000 milliGRAM(s) IV Intermittent every 8 hours  octreotide  Injectable 100 MICROGram(s) SubCutaneous three times a day  opium Tincture 6 milliGRAM(s) Oral <User Schedule>  pantoprazole    Tablet 40 milliGRAM(s) Oral before breakfast  psyllium Powder 1 Packet(s) Oral three times a day  sodium chloride 0.9%. 1000 milliLiter(s) (5 mL/Hr) IV Continuous <Continuous>  tamsulosin 0.4 milliGRAM(s) Oral daily      Assessment: 3M  s/p ex lap, MIRYAM, closure of enterotomy, abthera vac placement     Problem/Plan - 1:  ·  Problem: Small bowel obstruction.  Plan: s/p ex lap, MIRYAM, closure of enterotomy, abthera vac placement  s/p washout  s/p PICC line placement . On TPN.      Problem/Plan - 2:  ·  Problem: Bradycardia.    Plan: No further episodes of junctional rhythm after review of telemetry strips.   Continue off AV thomas blockers.      Problem/Plan - 3:  ·  Problem: Pneumothorax.  Plan: stable  s/p VATS on 1/15.       Rest of care as per SICU.     Over 25 minutes spent on total encounter; more than 50% of the visit was spent counseling and/or coordinating care by the attending physician.      Juan Carlos Mascorro MD [facility]  Cardiovascular Disease  [phone]

## 2020-01-20 NOTE — PROGRESS NOTE ADULT - ATTENDING COMMENTS
Dr. Carroll (Attending Physician)  Acute resp insufficiency with left hemo/ptx with chest tube in place now to waterseal, serous output from chest tube >1 liter yesterday will keep in place  High output ileostomy - 0.25 to 1 repletions, TPN stopped on calorie count, needs replacement PICC  Negative >3 liters yesterday Dr. Carroll (Attending Physician)  Acute resp insufficiency with left hemo/ptx with chest tube in place now to waterseal, serous output from chest tube >1 liter yesterday will keep in place, copd on pulmicort and duonebs, aggressive chest PT for pna  High output ileostomy - 0.25 to 1 repletions, TPN stopped on calorie count, needs replacement PICC, maximized on motility regimen  Negative >3 liters yesterday  Pseudomonas pna resistant to zosyn on meropenem

## 2020-01-20 NOTE — PROGRESS NOTE ADULT - ASSESSMENT
73 y/o M presenting with septic shock and high grade SBO s/p exploratory laparotomy, lysis of adhesions, decompression of bowel via enterotomy w/ primary repair, and Abthera VAC placement on 12/6; s/p take down of Abthera, washout and re-application of the Abthera vac on 12/8. Now s/p SBR and end ileostomy on 12/10 acute respiratory distress now improving, Pneumothorax, hyperglycemia, delirium. Now still with persistent pneumothorax when chest pigtail clamped.   12/23 VSS on room air recommending IR drainage of left chest  12/24 No evidence of air leak to left chest tube. Tube clamped. Repeat chest cxr in 4 hours. Anticipate chest tube removal if lung remains expanded. Drainage of loculated left effusion discussed with IR. IR to reconsult for drainage once initial tube removed. Discussed plan with Dr Mayes and IR Fellow  12/24  On NC ,    VSSchest xray sm lt pl eff, left chest tube dc'd.  12/26    2l NC   co  sob,  lt side diminshed  12/27 VSS, CXR with unchanged loculated left pleural effusion- IR consulted for pigtail placement, states effusion can be drained via thoracentesis, pigtail placement not indicated at this time - Dr. Mayes to speak with IR attending.   12/30 Patient with persistent PTX  and left pleural effusion now for IR drainage with Dr Elkins.   12/31 HD stable, L PTC w/ high output, serosanguinous drainage, 990 ml overnight/ 2200 in 24 hrs. Tolerating 2L NC supplemental O2, Cyto pending, continue monitor drainage output.   1/1: LPTC still with High output-400/24h. Continue with pigtail cath drain.  1/ 2     lt pigtail draining  on lws  1/3 HD stable, left pigtail catheter continues to drain. Incentive spirometer encouraged, OOB and mobility, continue pulmonary toileting. Maintain chest tube to suction and monitor output.   1/4: Chest tube milked to prevent clogging of tube, Continues to drain with high out put.  1/5 remains with high out put 170/700. Continue drainage  1/6  persistent drainage from lt pl tube  1/7 Left pigtail 150/450. Daily CXR. Planning for pleurex cath placement on Friday 1/10 w/ Dr. Mayes  1/8        Lt pleural tube   persistent draining    1/9 preop for pleurX placement tomorrow. CT clamped. NPO after MN, type and cross x2.   1/10 s/p VATS L pleurx cath placement   1/11 HD stable, no resp distress, maintain L pleurex to suction overnight, CXR in AM.   1/12 pleurex waterseal  1/13   RRT  called for respiratory distress   plx to pleurvac   back to lws   min drainage  no a/l  1/14 VSS, Left pleurx catheter minimal drainage 10ml/24h, CXR: small left pleural effusion with left pleurx catheter in place.     1/15 Overnight episode of bradycardia and respiratory distress that stabilized with BiPap support, concerned for anemia requiring 2 U PRBC and increase in left pleural effusion, minimal drainage in pleurx.  1/15 s/p bronch, Left VATS. Evacuation of 2L blood from L chest, pleurX removal, chest ubes x2  1/16 HD stable, minimal vasopressors, CXR stable, currently SBT, CT x2 remains to suction, draining.   1/17 HD stable, CXR stable, will water seal chest tubes today, repeat CXR in AM. Cont to monitor drainage.   1/18: L apical chest tube with small AL. CXr appear stable tiny left apical PTX seen on cxr. Continue chest tube to water seal for now as d/w Dr. Mayes today.   1/19 Remains with high out put from left apical. Will continue both chest tubes for today as d/w Dr. Mayes/Luis  1/20 Still with high output drainage remains serosanguenos. Continue drains for today

## 2020-01-20 NOTE — PROGRESS NOTE ADULT - SUBJECTIVE AND OBJECTIVE BOX
Subjective: Pt states" " Denies any CP, SOB, palpitations. No acute events overnight.    Vital Signs:  Vital Signs Last 24 Hrs  T(C): 37 (01-20-20 @ 08:00), Max: 37.4 (01-19-20 @ 23:00)  T(F): 98.6 (01-20-20 @ 08:00), Max: 99.3 (01-19-20 @ 23:00)  HR: 72 (01-20-20 @ 11:10) (62 - 94)  BP: 129/60 (01-19-20 @ 19:00) (129/60 - 129/60)  RR: 22 (01-20-20 @ 10:00) (12 - 37)  SpO2: 99% (01-20-20 @ 11:10) (91% - 100%) on (O2)        Relevant labs, radiology and Medications reviewed                        7.2    8.55  )-----------( 264      ( 20 Jan 2020 00:41 )             22.8     01-20    136  |  98  |  8   ----------------------------<  133<H>  4.1   |  33<H>  |  0.50    Ca    7.4<L>      20 Jan 2020 00:41  Phos  2.2     01-20  Mg     1.7     01-20    TPro  4.9<L>  /  Alb  2.0<L>  /  TBili  0.5  /  DBili  0.2  /  AST  9<L>  /  ALT  13  /  AlkPhos  56  01-20      MEDICATIONS  (STANDING):  acetaminophen   Tablet .. 975 milliGRAM(s) Oral every 6 hours  albuterol/ipratropium for Nebulization. 3 milliLiter(s) Nebulizer every 6 hours  buDESOnide    Inhalation Suspension 0.5 milliGRAM(s) Inhalation every 12 hours  chlorhexidine 2% Cloths 1 Application(s) Topical <User Schedule>  diphenoxylate/atropine 2 Tablet(s) Oral <User Schedule>  enoxaparin Injectable 40 milliGRAM(s) SubCutaneous daily  ergocalciferol 85423 Unit(s) Oral every week  loperamide 16 milliGRAM(s) Oral <User Schedule>  meropenem  IVPB 1000 milliGRAM(s) IV Intermittent every 8 hours  octreotide  Injectable 100 MICROGram(s) SubCutaneous three times a day  opium Tincture 6 milliGRAM(s) Oral <User Schedule>  pantoprazole    Tablet 40 milliGRAM(s) Oral before breakfast  psyllium Powder 1 Packet(s) Oral three times a day  sodium chloride 0.9%. 1000 milliLiter(s) (5 mL/Hr) IV Continuous <Continuous>  tamsulosin 0.4 milliGRAM(s) Oral daily    MEDICATIONS  (PRN):  ondansetron Injectable 4 milliGRAM(s) IV Push every 6 hours PRN Nausea and/or Vomiting      I&O's Summary    19 Jan 2020 07:01  -  20 Jan 2020 07:00  --------------------------------------------------------  IN: 1878 mL / OUT: 5450 mL / NET: -3572 mL    20 Jan 2020 07:01  -  20 Jan 2020 12:27  --------------------------------------------------------  IN: 255 mL / OUT: 435 mL / NET: -180 mL        IMAGING    CXR:    CT Chest:    PAST MEDICAL & SURGICAL HISTORY:  COPD with hypoxia  ETOHism  ETOH abuse  History of lumbosacral spine surgery  History of prostate surgery  History of appendectomy       Physical Exam:  Neurology: A&Ox3, nonfocal, WILEY x 4, NAD  Respiratory: B/L BS CTA, diminished at bases, No wheezing, rales, rhonchi  CV: RRR, S1S2

## 2020-01-20 NOTE — PROGRESS NOTE ADULT - ASSESSMENT
74 y/o M presenting with septic shock and high grade SBO s/p exploratory laparotomy, lysis of adhesions, decompression of bowel via enterotomy w/ primary repair, and Abthera VAC placement on 12/6; s/p take down of Abthera, washout and re-application of the Abthera vac on 12/8. 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 s/p L chest tube placement by IR 12/30 for pleural effusion now s/p VATS, with chest tube insertion for drainage of pleural effusion. RRT called 1/13 AM for hypoxia, patient transferred back to SICU.  Patient continued SOB, chest drain possible obstruction expanding. Patent taken back to OR emergently 1/15 for clot evac and VATS.     - Appreciate Continued SICU care  - Diet as tolerated  - Continue Lomotil, loperamide, tincture of opium, octreotide  - Follow up CT surgery regarding chest tubes  - TPN as with high ileostomy output, despite taking PO intake, absorption is questionable.

## 2020-01-20 NOTE — PROGRESS NOTE ADULT - SUBJECTIVE AND OBJECTIVE BOX
[facility] NUTRITION SUPPORT / TPN -- FOLLOW UP NOTE  --------------------------------------------------------------------------------    24 hour events/subjective:  rosas replaced for failed TOV.  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  Started on 1:1/4 repletions for ileostomy output.  Remains on PO diet with supplements, feels he is eating well.  ?absorption of diet, team would like to restart TPN.  Denies N/V.  Denies SOB/CP.      Diet:  Diet, Mechanical Soft:   No Carb Prosource (1pkg = 15gms Protein)     Qty per Day:  2  MCT Oil (HNT Only)     Qty per Day:  15mL  Supplement Feeding Modality:  Oral  Ensure Enlive Cans or Servings Per Day:  2       Frequency:  Daily  Promote Cans or Servings Per Day:  2       Frequency:  Daily (01-17-20 @ 12:44)      PAST HISTORY  --------------------------------------------------------------------------------  No significant changes to PMH, PSH, FHx, SHx, unless otherwise noted    ALLERGIES & MEDICATIONS  --------------------------------------------------------------------------------  Allergies    IV Contrast (Hives)    Intolerances      Standing Inpatient Medications  acetaminophen   Tablet .. 975 milliGRAM(s) Oral every 6 hours  albuterol/ipratropium for Nebulization. 3 milliLiter(s) Nebulizer every 6 hours  buDESOnide    Inhalation Suspension 0.5 milliGRAM(s) Inhalation every 12 hours  chlorhexidine 2% Cloths 1 Application(s) Topical <User Schedule>  diphenoxylate/atropine 2 Tablet(s) Oral <User Schedule>  enoxaparin Injectable 40 milliGRAM(s) SubCutaneous daily  ergocalciferol 59659 Unit(s) Oral every week  loperamide 16 milliGRAM(s) Oral <User Schedule>  meropenem  IVPB 1000 milliGRAM(s) IV Intermittent every 8 hours  octreotide  Injectable 100 MICROGram(s) SubCutaneous three times a day  opium Tincture 6 milliGRAM(s) Oral <User Schedule>  pantoprazole    Tablet 40 milliGRAM(s) Oral before breakfast  psyllium Powder 1 Packet(s) Oral three times a day  sodium chloride 0.9%. 1000 milliLiter(s) IV Continuous <Continuous>  tamsulosin 0.4 milliGRAM(s) Oral daily    PRN Inpatient Medications  ondansetron Injectable 4 milliGRAM(s) IV Push every 6 hours PRN      VITALS/PHYSICAL EXAM  --------------------------------------------------------------------------------  T(C): 37 (01-20-20 @ 08:00), Max: 37.4 (01-19-20 @ 23:00)  HR: 85 (01-20-20 @ 09:00) (62 - 94)  BP: 129/60 (01-19-20 @ 19:00) (129/60 - 129/60)  RR: 37 (01-20-20 @ 09:00) (12 - 37)  SpO2: 99% (01-20-20 @ 09:00) (91% - 100%)  Wt(kg): --        01-19-20 @ 07:01  -  01-20-20 @ 07:00  --------------------------------------------------------  IN: 1878 mL / OUT: 5450 mL / NET: -3572 mL    01-20-20 @ 07:01  -  01-20-20 @ 09:32  --------------------------------------------------------  IN: 10 mL / OUT: 275 mL / NET: -265 mL       Physical Exam:    General: AAOx3, NAD, lying comfortably in bed eating breakfast  HEENT: NC/AT  Respiratory: nonlabored breathing  Cardiovascular: RRR, normal S1 and S2, no murmurs or gallops  Abdomen: non-distended, soft, non-tender.  ostomy pink and viable with liquid stool, connected to rosas bag  Extremities: WWP  Drain: left chest tubes x2 to water seal  PICC site C/D/I        LABS/STUDIES  --------------------------------------------------------------------------------              7.2    8.55  >-----------<  264      [01-20-20 @ 00:41]              22.8     136  |  98  |  8   ----------------------------<  133      [01-20-20 @ 00:41]  4.1   |  33  |  0.50        Ca     7.4     [01-20-20 @ 00:41]      Mg     1.7     [01-20-20 @ 00:41]      Phos  2.2     [01-20-20 @ 00:41]    TPro  4.9  /  Alb  2.0  /  TBili  0.5  /  DBili  0.2  /  AST  9   /  ALT  13  /  AlkPhos  56  [01-20-20 @ 00:41]          Ca ionized  Creatinine Trend:  POC glucoseGlucose, Serum: 133 mg/dL (01-20-20 @ 00:41)  CAPILLARY BLOOD GLUCOSE        PrealbuminPrealbumin, Serum: 15 mg/dL (01-15-20 @ 09:01)  Prealbumin, Serum: 13 mg/dL (01-14-20 @ 07:29)  Prealbumin, Serum: 13 mg/dL (01-07-20 @ 02:35)  Prealbumin, Serum: 14 mg/dL (01-06-20 @ 07:40)  Prealbumin, Serum: 16 mg/dL (01-01-20 @ 02:48)    Triglycerides [facility] NUTRITION SUPPORT / TPN -- FOLLOW UP NOTE  --------------------------------------------------------------------------------    24 hour events/subjective:  rosas replaced for failed TOV.  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  Started on 1:1/4 repletions for ileostomy output.  Remains on PO diet with supplements, feels he is eating well.  ?absorption of diet.  Denies N/V.  Denies SOB/CP.      Diet:  Diet, Mechanical Soft:   No Carb Prosource (1pkg = 15gms Protein)     Qty per Day:  2  MCT Oil (HNT Only)     Qty per Day:  15mL  Supplement Feeding Modality:  Oral  Ensure Enlive Cans or Servings Per Day:  2       Frequency:  Daily  Promote Cans or Servings Per Day:  2       Frequency:  Daily (01-17-20 @ 12:44)      PAST HISTORY  --------------------------------------------------------------------------------  No significant changes to PMH, PSH, FHx, SHx, unless otherwise noted    ALLERGIES & MEDICATIONS  --------------------------------------------------------------------------------  Allergies    IV Contrast (Hives)    Intolerances      Standing Inpatient Medications  acetaminophen   Tablet .. 975 milliGRAM(s) Oral every 6 hours  albuterol/ipratropium for Nebulization. 3 milliLiter(s) Nebulizer every 6 hours  buDESOnide    Inhalation Suspension 0.5 milliGRAM(s) Inhalation every 12 hours  chlorhexidine 2% Cloths 1 Application(s) Topical <User Schedule>  diphenoxylate/atropine 2 Tablet(s) Oral <User Schedule>  enoxaparin Injectable 40 milliGRAM(s) SubCutaneous daily  ergocalciferol 62451 Unit(s) Oral every week  loperamide 16 milliGRAM(s) Oral <User Schedule>  meropenem  IVPB 1000 milliGRAM(s) IV Intermittent every 8 hours  octreotide  Injectable 100 MICROGram(s) SubCutaneous three times a day  opium Tincture 6 milliGRAM(s) Oral <User Schedule>  pantoprazole    Tablet 40 milliGRAM(s) Oral before breakfast  psyllium Powder 1 Packet(s) Oral three times a day  sodium chloride 0.9%. 1000 milliLiter(s) IV Continuous <Continuous>  tamsulosin 0.4 milliGRAM(s) Oral daily    PRN Inpatient Medications  ondansetron Injectable 4 milliGRAM(s) IV Push every 6 hours PRN      VITALS/PHYSICAL EXAM  --------------------------------------------------------------------------------  T(C): 37 (01-20-20 @ 08:00), Max: 37.4 (01-19-20 @ 23:00)  HR: 85 (01-20-20 @ 09:00) (62 - 94)  BP: 129/60 (01-19-20 @ 19:00) (129/60 - 129/60)  RR: 37 (01-20-20 @ 09:00) (12 - 37)  SpO2: 99% (01-20-20 @ 09:00) (91% - 100%)  Wt(kg): --        01-19-20 @ 07:01  -  01-20-20 @ 07:00  --------------------------------------------------------  IN: 1878 mL / OUT: 5450 mL / NET: -3572 mL    01-20-20 @ 07:01  -  01-20-20 @ 09:32  --------------------------------------------------------  IN: 10 mL / OUT: 275 mL / NET: -265 mL       Physical Exam:    General: AAOx3, NAD, lying comfortably in bed eating breakfast  HEENT: NC/AT  Respiratory: nonlabored breathing  Cardiovascular: RRR, normal S1 and S2, no murmurs or gallops  Abdomen: non-distended, soft, non-tender.  ostomy pink and viable with liquid stool, connected to rosas bag  Extremities: WWP  Drain: left chest tubes x2 to water seal  PICC site C/D/I        LABS/STUDIES  --------------------------------------------------------------------------------              7.2    8.55  >-----------<  264      [01-20-20 @ 00:41]              22.8     136  |  98  |  8   ----------------------------<  133      [01-20-20 @ 00:41]  4.1   |  33  |  0.50        Ca     7.4     [01-20-20 @ 00:41]      Mg     1.7     [01-20-20 @ 00:41]      Phos  2.2     [01-20-20 @ 00:41]    TPro  4.9  /  Alb  2.0  /  TBili  0.5  /  DBili  0.2  /  AST  9   /  ALT  13  /  AlkPhos  56  [01-20-20 @ 00:41]          Ca ionized  Creatinine Trend:  POC glucoseGlucose, Serum: 133 mg/dL (01-20-20 @ 00:41)  CAPILLARY BLOOD GLUCOSE        PrealbuminPrealbumin, Serum: 15 mg/dL (01-15-20 @ 09:01)  Prealbumin, Serum: 13 mg/dL (01-14-20 @ 07:29)  Prealbumin, Serum: 13 mg/dL (01-07-20 @ 02:35)  Prealbumin, Serum: 14 mg/dL (01-06-20 @ 07:40)  Prealbumin, Serum: 16 mg/dL (01-01-20 @ 02:48)    Triglycerides

## 2020-01-21 LAB
ALBUMIN SERPL ELPH-MCNC: 2.1 G/DL — LOW (ref 3.3–5)
ALP SERPL-CCNC: 58 U/L — SIGNIFICANT CHANGE UP (ref 40–120)
ALT FLD-CCNC: 12 U/L — SIGNIFICANT CHANGE UP (ref 10–45)
ANION GAP SERPL CALC-SCNC: 5 MMOL/L — SIGNIFICANT CHANGE UP (ref 5–17)
ANION GAP SERPL CALC-SCNC: 9 MMOL/L — SIGNIFICANT CHANGE UP (ref 5–17)
AST SERPL-CCNC: 12 U/L — SIGNIFICANT CHANGE UP (ref 10–40)
BILIRUB DIRECT SERPL-MCNC: 0.2 MG/DL — SIGNIFICANT CHANGE UP (ref 0–0.2)
BILIRUB INDIRECT FLD-MCNC: 0.3 MG/DL — SIGNIFICANT CHANGE UP (ref 0.2–1)
BILIRUB SERPL-MCNC: 0.5 MG/DL — SIGNIFICANT CHANGE UP (ref 0.2–1.2)
BUN SERPL-MCNC: 6 MG/DL — LOW (ref 7–23)
BUN SERPL-MCNC: 8 MG/DL — SIGNIFICANT CHANGE UP (ref 7–23)
CALCIUM SERPL-MCNC: 7.4 MG/DL — LOW (ref 8.4–10.5)
CALCIUM SERPL-MCNC: 7.6 MG/DL — LOW (ref 8.4–10.5)
CHLORIDE SERPL-SCNC: 97 MMOL/L — SIGNIFICANT CHANGE UP (ref 96–108)
CHLORIDE SERPL-SCNC: 98 MMOL/L — SIGNIFICANT CHANGE UP (ref 96–108)
CO2 SERPL-SCNC: 33 MMOL/L — HIGH (ref 22–31)
CO2 SERPL-SCNC: 36 MMOL/L — HIGH (ref 22–31)
CREAT SERPL-MCNC: 0.4 MG/DL — LOW (ref 0.5–1.3)
CREAT SERPL-MCNC: 0.47 MG/DL — LOW (ref 0.5–1.3)
CRP SERPL-MCNC: 2.52 MG/DL — HIGH (ref 0–0.4)
CULTURE RESULTS: SIGNIFICANT CHANGE UP
GLUCOSE BLDC GLUCOMTR-MCNC: 195 MG/DL — HIGH (ref 70–99)
GLUCOSE SERPL-MCNC: 118 MG/DL — HIGH (ref 70–99)
GLUCOSE SERPL-MCNC: 122 MG/DL — HIGH (ref 70–99)
HCT VFR BLD CALC: 24.7 % — LOW (ref 39–50)
HGB BLD-MCNC: 7.7 G/DL — LOW (ref 13–17)
MAGNESIUM SERPL-MCNC: 1.9 MG/DL — SIGNIFICANT CHANGE UP (ref 1.6–2.6)
MAGNESIUM SERPL-MCNC: 2.1 MG/DL — SIGNIFICANT CHANGE UP (ref 1.6–2.6)
MCHC RBC-ENTMCNC: 30.8 PG — SIGNIFICANT CHANGE UP (ref 27–34)
MCHC RBC-ENTMCNC: 31.2 GM/DL — LOW (ref 32–36)
MCV RBC AUTO: 98.8 FL — SIGNIFICANT CHANGE UP (ref 80–100)
NRBC # BLD: 0 /100 WBCS — SIGNIFICANT CHANGE UP (ref 0–0)
PHOSPHATE SERPL-MCNC: 1.7 MG/DL — LOW (ref 2.5–4.5)
PHOSPHATE SERPL-MCNC: 2.2 MG/DL — LOW (ref 2.5–4.5)
PLATELET # BLD AUTO: 298 K/UL — SIGNIFICANT CHANGE UP (ref 150–400)
POTASSIUM SERPL-MCNC: 4.1 MMOL/L — SIGNIFICANT CHANGE UP (ref 3.5–5.3)
POTASSIUM SERPL-MCNC: 4.2 MMOL/L — SIGNIFICANT CHANGE UP (ref 3.5–5.3)
POTASSIUM SERPL-SCNC: 4.1 MMOL/L — SIGNIFICANT CHANGE UP (ref 3.5–5.3)
POTASSIUM SERPL-SCNC: 4.2 MMOL/L — SIGNIFICANT CHANGE UP (ref 3.5–5.3)
PREALB SERPL-MCNC: 13 MG/DL — LOW (ref 20–40)
PROT SERPL-MCNC: 5 G/DL — LOW (ref 6–8.3)
RBC # BLD: 2.5 M/UL — LOW (ref 4.2–5.8)
RBC # FLD: 13.7 % — SIGNIFICANT CHANGE UP (ref 10.3–14.5)
SODIUM SERPL-SCNC: 139 MMOL/L — SIGNIFICANT CHANGE UP (ref 135–145)
SODIUM SERPL-SCNC: 139 MMOL/L — SIGNIFICANT CHANGE UP (ref 135–145)
SPECIMEN SOURCE: SIGNIFICANT CHANGE UP
SURGICAL PATHOLOGY STUDY: SIGNIFICANT CHANGE UP
TRIGL SERPL-MCNC: 76 MG/DL — SIGNIFICANT CHANGE UP (ref 10–149)
WBC # BLD: 9.65 K/UL — SIGNIFICANT CHANGE UP (ref 3.8–10.5)
WBC # FLD AUTO: 9.65 K/UL — SIGNIFICANT CHANGE UP (ref 3.8–10.5)

## 2020-01-21 PROCEDURE — 99232 SBSQ HOSP IP/OBS MODERATE 35: CPT

## 2020-01-21 PROCEDURE — 99233 SBSQ HOSP IP/OBS HIGH 50: CPT | Mod: GC

## 2020-01-21 PROCEDURE — 99024 POSTOP FOLLOW-UP VISIT: CPT

## 2020-01-21 PROCEDURE — 71045 X-RAY EXAM CHEST 1 VIEW: CPT | Mod: 26

## 2020-01-21 PROCEDURE — 36584 COMPL RPLCMT PICC RS&I: CPT | Mod: 53

## 2020-01-21 RX ORDER — ACETAMINOPHEN 500 MG
975 TABLET ORAL EVERY 6 HOURS
Refills: 0 | Status: DISCONTINUED | OUTPATIENT
Start: 2020-01-21 | End: 2020-02-04

## 2020-01-21 RX ORDER — MAGNESIUM SULFATE 500 MG/ML
2 VIAL (ML) INJECTION ONCE
Refills: 0 | Status: COMPLETED | OUTPATIENT
Start: 2020-01-21 | End: 2020-01-21

## 2020-01-21 RX ORDER — INSULIN LISPRO 100/ML
VIAL (ML) SUBCUTANEOUS EVERY 6 HOURS
Refills: 0 | Status: DISCONTINUED | OUTPATIENT
Start: 2020-01-21 | End: 2020-01-27

## 2020-01-21 RX ORDER — ACETYLCYSTEINE 200 MG/ML
4 VIAL (ML) MISCELLANEOUS THREE TIMES A DAY
Refills: 0 | Status: COMPLETED | OUTPATIENT
Start: 2020-01-21 | End: 2020-01-23

## 2020-01-21 RX ORDER — ELECTROLYTE SOLUTION,INJ
1 VIAL (ML) INTRAVENOUS
Refills: 0 | Status: DISCONTINUED | OUTPATIENT
Start: 2020-01-21 | End: 2020-01-21

## 2020-01-21 RX ORDER — I.V. FAT EMULSION 20 G/100ML
12.5 EMULSION INTRAVENOUS
Qty: 30 | Refills: 0 | Status: DISCONTINUED | OUTPATIENT
Start: 2020-01-21 | End: 2020-01-22

## 2020-01-21 RX ADMIN — MEROPENEM 100 MILLIGRAM(S): 1 INJECTION INTRAVENOUS at 22:13

## 2020-01-21 RX ADMIN — Medication 1 PACKET(S): at 08:55

## 2020-01-21 RX ADMIN — Medication 4 MILLILITER(S): at 15:44

## 2020-01-21 RX ADMIN — Medication 1 PACKET(S): at 22:12

## 2020-01-21 RX ADMIN — CHLORHEXIDINE GLUCONATE 1 APPLICATION(S): 213 SOLUTION TOPICAL at 05:05

## 2020-01-21 RX ADMIN — Medication 2 TABLET(S): at 08:55

## 2020-01-21 RX ADMIN — Medication 975 MILLIGRAM(S): at 18:07

## 2020-01-21 RX ADMIN — Medication 4 MILLILITER(S): at 05:10

## 2020-01-21 RX ADMIN — TAMSULOSIN HYDROCHLORIDE 0.4 MILLIGRAM(S): 0.4 CAPSULE ORAL at 14:52

## 2020-01-21 RX ADMIN — Medication 3 MILLILITER(S): at 23:10

## 2020-01-21 RX ADMIN — MEROPENEM 100 MILLIGRAM(S): 1 INJECTION INTRAVENOUS at 15:22

## 2020-01-21 RX ADMIN — Medication 0.5 MILLIGRAM(S): at 18:50

## 2020-01-21 RX ADMIN — Medication 0.5 MILLIGRAM(S): at 05:09

## 2020-01-21 RX ADMIN — MORPHINE 6 MILLIGRAM(S): 10 SOLUTION ORAL at 22:13

## 2020-01-21 RX ADMIN — Medication 16 MILLIGRAM(S): at 14:51

## 2020-01-21 RX ADMIN — SODIUM CHLORIDE 5 MILLILITER(S): 9 INJECTION INTRAMUSCULAR; INTRAVENOUS; SUBCUTANEOUS at 17:35

## 2020-01-21 RX ADMIN — Medication 2 TABLET(S): at 17:57

## 2020-01-21 RX ADMIN — Medication 975 MILLIGRAM(S): at 18:47

## 2020-01-21 RX ADMIN — Medication 2 TABLET(S): at 15:00

## 2020-01-21 RX ADMIN — I.V. FAT EMULSION 12.5 ML/HR: 20 EMULSION INTRAVENOUS at 17:36

## 2020-01-21 RX ADMIN — Medication 3 MILLILITER(S): at 00:17

## 2020-01-21 RX ADMIN — OCTREOTIDE ACETATE 100 MICROGRAM(S): 200 INJECTION, SOLUTION INTRAVENOUS; SUBCUTANEOUS at 22:56

## 2020-01-21 RX ADMIN — PANTOPRAZOLE SODIUM 40 MILLIGRAM(S): 20 TABLET, DELAYED RELEASE ORAL at 08:55

## 2020-01-21 RX ADMIN — Medication 2 TABLET(S): at 22:13

## 2020-01-21 RX ADMIN — OCTREOTIDE ACETATE 100 MICROGRAM(S): 200 INJECTION, SOLUTION INTRAVENOUS; SUBCUTANEOUS at 05:04

## 2020-01-21 RX ADMIN — Medication 50 GRAM(S): at 02:00

## 2020-01-21 RX ADMIN — Medication 250 MILLIMOLE(S): at 04:42

## 2020-01-21 RX ADMIN — Medication 250 MILLIMOLE(S): at 02:14

## 2020-01-21 RX ADMIN — Medication 16 MILLIGRAM(S): at 08:56

## 2020-01-21 RX ADMIN — Medication 16 MILLIGRAM(S): at 22:14

## 2020-01-21 RX ADMIN — MORPHINE 6 MILLIGRAM(S): 10 SOLUTION ORAL at 15:01

## 2020-01-21 RX ADMIN — MORPHINE 6 MILLIGRAM(S): 10 SOLUTION ORAL at 08:55

## 2020-01-21 RX ADMIN — Medication 3 MILLILITER(S): at 15:45

## 2020-01-21 RX ADMIN — OCTREOTIDE ACETATE 100 MICROGRAM(S): 200 INJECTION, SOLUTION INTRAVENOUS; SUBCUTANEOUS at 14:53

## 2020-01-21 RX ADMIN — MEROPENEM 100 MILLIGRAM(S): 1 INJECTION INTRAVENOUS at 05:04

## 2020-01-21 RX ADMIN — SODIUM CHLORIDE 5 MILLILITER(S): 9 INJECTION INTRAMUSCULAR; INTRAVENOUS; SUBCUTANEOUS at 06:57

## 2020-01-21 RX ADMIN — Medication 250 MILLIMOLE(S): at 18:35

## 2020-01-21 RX ADMIN — Medication 4 MILLILITER(S): at 23:08

## 2020-01-21 RX ADMIN — Medication 250 MILLIMOLE(S): at 03:29

## 2020-01-21 RX ADMIN — ENOXAPARIN SODIUM 40 MILLIGRAM(S): 100 INJECTION SUBCUTANEOUS at 14:52

## 2020-01-21 RX ADMIN — Medication 1 EACH: at 17:36

## 2020-01-21 RX ADMIN — Medication 1 PACKET(S): at 14:52

## 2020-01-21 RX ADMIN — Medication 16 MILLIGRAM(S): at 17:58

## 2020-01-21 RX ADMIN — Medication 3 MILLILITER(S): at 05:09

## 2020-01-21 RX ADMIN — MORPHINE 6 MILLIGRAM(S): 10 SOLUTION ORAL at 17:58

## 2020-01-21 NOTE — PROGRESS NOTE ADULT - SUBJECTIVE AND OBJECTIVE BOX
VITAL SIGNS    Vital Signs Last 24 Hrs  T(C): 36.9 (20 @ 15:00), Max: 37.5 (20 @ 23:00)  T(F): 98.4 (20 @ 15:00), Max: 99.5 (20 @ 23:00)  HR: 93 (20 @ 15:55) (68 - 102)  BP: 154/72 (20 @ 15:00) (90/54 - 154/72)  RR: 25 (20 @ 15:00) (12 - 35)  SpO2: 100% (20 @ 15:55) (94% - 100%)                   Daily     Daily Weight in k.2 (2020 00:00)      Bilirubin Direct, Serum: 0.2 mg/dL ( @ 00:26)  Bilirubin Total, Serum: 0.5 mg/dL ( 00:26)    CAPILLARY BLOOD GLUCOSE              Drains:              L Pleural    ct x 2  h20                    PHYSICAL EXAM  s   No chest pain  no son  no palpitations"  Neurology: alert and oriented x 3, moves all extremities with no defecits  CV :  RRR  Lungs:   lt side diminshed throughout  Abdomen: soft, nontender, nondistended, positive bowel sounds  Extremities:     cachetic

## 2020-01-21 NOTE — PROGRESS NOTE ADULT - SUBJECTIVE AND OBJECTIVE BOX
Patient is a 75y old  Male who presents with a chief complaint of abd. pain (2020 13:14)    f/u leukocytosis and +fungitell    Interval History/ROS:  coughing.  no fever.  sputum with zosyn-R PA.  CT x2 with small amount bleeding and both are to gravity.  phlegm but not really coming up.  Rest of ROS otherwise negative.    PAST MEDICAL & SURGICAL HISTORY:  COPD with hypoxia  ETOH abuse  History of lumbosacral spine surgery  History of prostate surgery  History of appendectomy    Allergies  IV Contrast (Hives)    ANTIMICROBIALS:  acyclovir   Oral Tab/Cap (-)  cefTRIAXone   IVPB (12/6 x1)  piperacillin/tazobactam IVPB (-12/15; -)  ceFAZolin   IVPB (-)  vancomycin  IVPB (1/13 x1; -    active  meropenem  IVPB 1000 every 8 hours (-)    MEDICATIONS  (STANDING):  acetylcysteine 20%  Inhalation 4 three times a day  albuterol/ipratropium for Nebulization. 3 every 6 hours  buDESOnide    Inhalation Suspension 0.5 every 12 hours  diphenoxylate/atropine 2 <User Schedule>  enoxaparin Injectable 40 daily  loperamide 16 <User Schedule>  octreotide  Injectable 100 three times a day  opium Tincture 6 <User Schedule>  pantoprazole    Tablet 40 before breakfast  psyllium Powder 1 three times a day  tamsulosin 0.4 daily    Vital Signs Last 24 Hrs  T(F): 99.1 (20 @ 11:00), Max: 99.5 (20 @ 23:00)  HR: 87 (20 @ 11:30)  BP: 127/59 (20 @ 11:30)  RR: 28 (20 @ 11:30)  SpO2: 94% (20 @ 11:30) (92% - 100%)    PHYSICAL EXAM:  Constitutional: cachectic, in chair no distress  HEAD/EYES:  anicteric, no conjunctival injection  ENT:  supple, extubated; dry mouth  Cardiovascular:   normal S1, S2, no murmur, (+) edema b/l legs  Respiratory:  coarse b/l breath sounds; left CT x2 to gravity  GI:  distended, ileostomy with liquid stool, non-tender  : rosas placed  Musculoskeletal:  no synovitis  Neurologic: awake and alert, no focal findings  Skin:  no rash, no erythema, no phlebitis  Psychiatric:  awake, alert, appropriate mood                                     7.7    9.65  )-----------( 298      ( 2020 00:26 )             24.7     139  |  98  |  8   ----------------------------<  122  4.2   |  36  |  0.47  Ca    7.4      2020 00:26Phos  1.7     Mg     1.9       TPro  5.0  /  Alb  2.1  /  TBili  0.5  /  DBili  0.2  /  AST  12  /  ALT  12  /  AlkPhos  58      WBC Count: 9.65 (20 @ 00:26)  WBC Count: 8.55 (20 @ 00:41)  WBC Count: 9.79 (20 @ 01:02)  WBC Count: 9.48 (20 @ 12:46)  WBC Count: 9.93 (20 @ 01:13)  WBC Count: 12.35 (20 @ 12:15)  WBC Count: 12.93 (20 @ 00:12)  WBC Count: 15.05 (20 @ 12:47)  WBC Count: 25.73 (20 @ 00:14)    Urinalysis Basic - ( 2020 12:41 )  Color: brown / Appearance: Turbid / S.016 / pH: x  Gluc: x / Ketone: Negative  / Bili: Negative / Urobili: Negative   Blood: x / Protein: 30 mg/dL / Nitrite: Negative   Leuk Esterase: Negative / RBC: >50 /hpf / WBC 6 /HPF   Sq Epi: x / Non Sq Epi: 0 /hpf / Bacteria: Few    Cytopathology - Non Gyn Report (19 @ 18:26)    Specimen(s) Submitted  PLEURAL FLUID  Final Diagnosis  PLEURAL FLUID  NEGATIVE FOR MALIGNANT CELLS.    Surgical Pathology Report (12.10.19 @ 21:40)    Surgical Final Report  Final Diagnosis  Small bowel with ileocolic resection:  - Small bowel with diffuse ischemic enteritis, transmural active inflammation, abscess formation and focal necrosis  - Ischemic enteritis extending to proximal small bowel resection margin  - Distal colonic resection margin, viable    MICROBIOLOGY:    Culture - Bronchial (20 @ 06:43)    Gram Stain:   Moderate polymorphonuclear leukocytes per low power field  Few Squamous epithelial cells per low power field  Rare Gram positive cocci in pairs  Rare Gram Negative Rods    -  Gentamicin: I 8    -  Levofloxacin: S <=2    -  Meropenem: S 2    -  Piperacillin/Tazobactam: R >64    -  Tobramycin: S <=2    -  Amikacin: I 32    -  Cefepime: R >16    -  Ceftazidime: R >16    -  Aztreonam: R >16    -  Ciprofloxacin: S <=1    -  Imipenem: I 4    Specimen Source: Bronch Wash Combicath    Culture Results:   Rare Pseudomonas aeruginosa    Culture - Fungal, Blood (01.15.20 @ 10:09)    Specimen Source: .Blood Blood-Venous    Culture Results:   Testing in progress    Culture - Fungal, Blood (01.15.20 @ 10:09)    Specimen Source: .Blood Blood-Peripheral    Culture Results:   Testing in progress    HIV-1/2 Combo Result: Nonreact: (01.15.20 @ 09:03)    Aspergillus Galactomannan Antigen (01.15.20 @ 10:06)    Aspergillus Galactomannan Antigen: <0.500:     Fungitell (20 @ 11:42)    Fungitell: 241:     Culture - Fungal, Blood (collected 2020 18:15)  Source: .Blood Blood  Preliminary Report (2020 08:34):    Testing in progress    Culture - Blood (collected 2020 06:13)  Source: .Blood Blood-Peripheral  Preliminary Report (2020 07:02):    No growth to date.    Culture - Acid Fast (19 @ 03:58)    AFB Specimen Processing: Concentration    Acid-Fast Smear: Negative: Performed At: [address] [id number]  Paresh Roche MD Ph:[phone]    Culture - Blood (19 @ 22:09)    Specimen Source: .Blood Blood-Peripheral    Culture Results:   No growth at 5 days.    Culture - Fungal, Body Fluid (19 @ 19:16)    Specimen Source: .Body Fluid Pleural Fluid    Culture Results:   No growth    Culture - Body Fluid with Gram Stain (19 @ 19:16)    Gram Stain:   No polymorphonuclear cells seen  No organisms seen  by cytocentrifuge    Specimen Source: .Body Fluid Pleural Fluid    Culture Results:   No growth at 5 days    Culture - Blood (19 @ 22:20)    Specimen Source: .Blood Blood    Culture Results:   No growth at 5 days.    Culture - Blood (19 @ 22:20)    Specimen Source: .Blood Blood    Culture Results:   No growth at 5 days.    RADIOLOGY:  Xray Chest 1 View- PORTABLE-Urgent (20 @ 01:33) >  Impression:  The heart is normal in size. Right pleural effusion. The left costophrenic angle is not included in this study. A left chest tube is in place. No pneumothorax. A PICC line is seen on the right and the tip is in the superior vena cava. Bibasilar pneumonia and/or atelectasis cannot be ruled out entirely. -  improved c/w   above radiology personally reviewed and agree with finding    Xray Chest 1 View- PORTABLE-Routine (20 @ 07:07) >  IMPRESSION:  Interval removal of the endotracheal tube. Bibasilar opacities.  imaging above personally reviewed and agree with findings    CT Abdomen and Pelvis w/ IV Cont (01.15.20 @ 13:25) >  There is complete atelectasis of the left lower lobe and partial atelectasis of the left upper lobe. Partial compressive atelectasis of the right lower lobe. Emphysema. Nodular opacities in the right lower lobe, unchanged.  PLEURA: There is a left chest tubes. There is a large loculated left pleural effusion with areas of high attenuation, increased in size. Trace left pneumothorax. There is a small to moderate right pleural effusion. IMPRESSION:  Loculated large left pleural effusion with areas of hyperattenuation likely secondary to hematoma. Interval increase in size of the loculated left pleural effusion since 2019. Left sided chest tube terminating in the pleural space. Trace left pneumothorax.  Extensive left subcutaneous emphysema. Hematoma is also noted along the left lateral chest wall.  Interval resolution of the midline anterior abdominal wall fluid collection.  Markedly distended urinary bladder with mild bilateral hydronephrosis.  A small droplet of gas in the right anterior abdomen adjacent     Xray Chest 1 View- PORTABLE-Urgent (20 @ 04:11) >  Impression:  The heart is normal in size. Left pleural effusion. Left lower lobe pneumonia and/or atelectasis. Small right pleural effusion cannot be ruled out. A PICC line is seen on the right and the tip is in superior vena cava. No pneumothorax. Calcified aortic knob.    Xray Chest 1 View- PORTABLE-Routine (20 @ 09:47) >  IMPRESSION:  Interval placement of right-sided chest tube.  Bilateral lower lung patchy opacities and small bilateral pleural effusions likely representing pulmonary edema.     Xray Chest 1 View- PORTABLE-Routine (20 @ 07:00) >  IMPRESSION: Right PICC line is unchanged. Left pleural catheters are partially imaged. Small pleural effusions likely with associated areas of atelectasis. No clear evidence of a pneumothorax given left superimposed subcutaneous emphysema.    CT Abdomen and Pelvis w/ IV Cont (19 @ 06:05) >  IMPRESSION:   Small midline anterior abdominal wall fluid collection may represent postoperative seroma, hematoma, or less likely abscess.  Interval resolution of the left pneumothorax.  Bilateral pleural effusions, left greater than right, slightly increased in size compared to the prior study.  Unchanged right lower lobe tree-in-bud/nodular opacities with secretions in the bilateral lower lobe bronchi. Findings may represent pneumonia versus distal mucoid impacted airways.    CT Chest No Cont (19 @ 19:39) >  IMPRESSION:  Left-sided chest tube in place with trace left apical pneumothorax.  Patchy airspace opacities in the right lower lobe, differential favors distal mucoid impacted airways versus pneumonia.  Moderate left and small right pleural effusions with mild subsegmental atelectasis of the right lower lobe.    CT Abdomen and Pelvis No Cont (19 @ 16:48) >  IMPRESSION:   High-grade small bowel obstruction with transition point in the right lower quadrant.

## 2020-01-21 NOTE — PROGRESS NOTE ADULT - PROBLEM SELECTOR PLAN 1
s/p 1/10 Left VATS pleurx catheter placement  since removed  s/p 1/15 L VATS evacuation L hemothorax -  Continue chest tubes to water seal   Strict I & O's- monitor drainage from chest tubes   will consider  possible bedside pleuradesis when drainage   decreases  Daily CXR  with ct's  in place  Continue management as per primary team

## 2020-01-21 NOTE — PROGRESS NOTE ADULT - SUBJECTIVE AND OBJECTIVE BOX
SURGICAL INTENSIVE CARE UNIT DAILY PROGRESS NOTE    24 HOUR EVENTS:   - R femoral a-line discontinued.   - No acute events overnight.     HPI:  76 y/o male with a past medical history of COPD and EtOH dependence who presented on 12/6/2019 with abdominal pain, nausea, hematemesis, and poor PO intake for ~2-3 days. In the ED, he was found to be hypotensive & tachycardic. Labs revealed an CHI and lactic acidosis. Imaging revealed a high grade SBO in the RLQ on CT scan. Hospital course is as follows:  12/06 - s/p exploratory laparotomy, lysis of adhesions, decompression of bowel via enterotomy w/ primary repair, and Abthera VAC placement as the distal 50% of the bowel appeared dusky but was still viable, admitted to SICU post-operatively as he was on vasopressor support and was left intubated  12/08 - s/p re-exploration, proximal 165 cm of small bowel appeared pink & viable but beyond that had patchy areas of ischemia so decision was made to give the bowel more time to demarcate before resecting in order to preserve as much small bowel as possible so Abthera VAC was replaced  12/09 - s/p re-exploration, small bowel resection of 150 cm (150 cm remaining), ileocecetomy, end ileostomy, mucous fistula, and abdominal closure  12/11 - extubated to BiPAP, noted to be in SVT that was rate controlled w/ metoprolol  12/14 - started on TPN  12/15 - noted to have spontaneous left pneumothorax s/p left pigtail catheter placement, noted to be in SVT again so amiodarone started, high ileostomy output noted so concern for short bowel syndrome  12/16 - amiodarone discontinued, started on beta blocker with metoprolol  12/18 - started Lomotil and ileostomy repletions with IV fluids  12/19 - started Imodium, left pigtail catheter was placed to water seal but pneumothorax noted on follow-up CXR so placed back to suction  12/20 - started tincture of opium, concern for aspiration so changed diet from regular to dysphagia 1 pureed with nectar-thickened liquids  12/22 - left pigtail catheter placed to water seal with no pneumothorax noted on follow-up CXR, CT chest with moderate left pleural effusion not being drained by pigtail catheter  12/23 - 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  12/24 - left pigtail catheter discontinued  12/28 - started Ancef for erythematous midline wound  12/29 - beta blocker discontinued for intermittent episodes of hypotension  12/30 - left pigtail catheter placement by IR with drainage of serous transudative fluid  01/02 - FEES demonstrating penetration with thin liquids so patient kept on dysphagia 1 pureed with nectar-thickened liquids  01/07 - Transferred to floors  01/10 - s/p left VATS w/ PleurX catheter placement  01/11 - evaluated by speech & swallow, advanced diet to mechanical soft with thin liquids  01/12 - PleurX catheter placed to water seal with on pneumothorax on follow-up CXR  01/13 - RRT for hypoxemia, placed on BiPAP, PleurX catheter placed back to suction, started vancomycin & Zosyn for possible HCAP  01/14 - noted to have minimal output from PleurX catheter, lung ultrasound with large left pleural effusion concerning for hemothorax, multiple episodes of bradycardia secondary to hypoxemia, remains on BiPAP  01/15 - worsening respiratory distress requiring intubation, hypotensive requiring vasopressor support, CT chest with large left hemothorax w/ trace pneumothorax & extensive subcutaneous emphysema so taken to OR emergently for left VATS, evacuation fo 2 L of clot, and placement of two left chest tubes  01/16 - extubated, weaned off vasopressor support  01/17 - two left chest tubes placed to water seal with no pneumothorax noted on follow-up CXR    NEURO: AOx3    RESPIRATORY  RR: 26 (01-21-20 @ 00:00) (12 - 37)  SpO2: 99% (01-21-20 @ 00:18) (91% - 100%)    CARDIOVASCULAR  HR: 90 (01-21-20 @ 00:18) (62 - 97)  BP: 131/74 (01-21-20 @ 00:00) (117/56 - 150/77)  BP(mean): 97 (01-21-20 @ 00:00) (80 - 105)  ABP: 149/60 (01-20-20 @ 19:00) (105/46 - 161/65)  ABP(mean): 96 (01-20-20 @ 19:00) (69 - 108)    GI/NUTRITION  Soft, nontender, nondistended. Ileostomy pink, viable.     GENITOURINARY  I&O's Detail    01-19 @ 07:01 - 01-20 @ 07:00  --------------------------------------------------------  IN:    Oral Fluid: 600 mL    sodium chloride 0.9%.: 578 mL    Solution: 600 mL    Solution: 100 mL  Total IN: 1878 mL    OUT:    Chest Tube: 1020 mL    Chest Tube: 220 mL    Ileostomy: 2100 mL    Indwelling Catheter - Urethral: 935 mL    Intermittent Catheterization - Urethral: 850 mL    Voided: 325 mL  Total OUT: 5450 mL    Total NET: -3572 mL      01-20 @ 07:01 - 01-21 @ 00:30  --------------------------------------------------------  IN:    Oral Fluid: 960 mL    sodium chloride 0.9%.: 600 mL    Solution: 100 mL  Total IN: 1660 mL    OUT:    Chest Tube: 30 mL    Chest Tube: 270 mL    Ileostomy: 2150 mL    Indwelling Catheter - Urethral: 1610 mL  Total OUT: 4060 mL    Total NET: -2400 mL          01-20    136  |  98  |  8   ----------------------------<  133<H>  4.1   |  33<H>  |  0.50    Ca    7.4<L>      20 Jan 2020 00:41  Phos  2.2     01-20  Mg     1.7     01-20    TPro  4.9<L>  /  Alb  2.0<L>  /  TBili  0.5  /  DBili  0.2  /  AST  9<L>  /  ALT  13  /  AlkPhos  56  01-20      HEMATOLOGIC                        7.2    8.55  )-----------( 264      ( 20 Jan 2020 00:41 )             22.8         INFECTIOUS DISEASES  RECENT CULTURES:  Specimen Source: Bronch Wash Combicath  Date/Time: 01-16 @ 06:43  Culture Results:   Rare Pseudomonas aeruginosa  Gram Stain:   Moderate polymorphonuclear leukocytes per low power field  Few Squamous epithelial cells per low power field  Rare Gram positive cocci in pairs  Rare Gram Negative Rods  Organism: Pseudomonas aeruginosa  Specimen Source: .Blood Blood  Date/Time: 01-16 @ 02:02  Culture Results:   No growth to date.  Gram Stain: --  Organism: --      IMAGING:  < from: Xray Chest 1 View- PORTABLE-Routine (01.20.20 @ 07:04) >  IMPRESSION:  Right PICC and left pleural chest tube are unchanged. No pneumothorax.    Persistent small right pleural effusion. Left CP angle excluded from view.    Unchanged left basilar fine interstitial prominence. Clear remaining visualized lungs.    Stable cardiac and mediastinal silhouettes.    < end of copied text >

## 2020-01-21 NOTE — PROGRESS NOTE ADULT - ATTENDING COMMENTS
Patient seen and examined  Doing well  Ileostomy still high output  Chest tubes still high output    Appreciate continued SICU care  Plan for PICC + TPN  Follow up thoracic surgery regarding chest tubes

## 2020-01-21 NOTE — PROGRESS NOTE ADULT - SUBJECTIVE AND OBJECTIVE BOX
Cardiovascular Disease Progress Note    Overnight events: No acute events overnight. Mr. Hernandez denies chest pain or SOB.   Otherwise review of systems negative    Objective Findings:  T(C): 37.2 (20 @ 03:00), Max: 37.5 (20 @ 23:00)  HR: 85 (20 @ 06:00) (68 - 102)  BP: 127/59 (20 @ 06:00) (90/54 - 150/77)  RR: 15 (20 @ 06:00) (12 - 37)  SpO2: 96% (20 @ 06:00) (91% - 100%)  Wt(kg): --  Daily     Daily Weight in k.2 (2020 00:00)      Physical Exam:  Gen: NAD; Patient resting comfortably  HEENT: EOMI, Normocephalic/ atraumatic  CV: RRR, normal S1 + S2, no m/r/g  Lungs:  Normal respiratory effort; clear to auscultation bilaterally  Abd: soft, non-tender; bowel sounds present  Ext: No edema; warm and well perfused    Telemetry: Sinus    Laboratory Data:                        7.7    9.65  )-----------( 298      ( 2020 00:26 )             24.7         139  |  98  |  8   ----------------------------<  122<H>  4.2   |  36<H>  |  0.47<L>    Ca    7.4<L>      2020 00:26  Phos  1.7       Mg     1.9         TPro  5.0<L>  /  Alb  2.1<L>  /  TBili  0.5  /  DBili  0.2  /  AST  12  /  ALT  12  /  AlkPhos  58                Inpatient Medications:  MEDICATIONS  (STANDING):  acetaminophen   Tablet .. 975 milliGRAM(s) Oral every 6 hours  acetylcysteine 20%  Inhalation 4 milliLiter(s) Inhalation three times a day  albuterol/ipratropium for Nebulization. 3 milliLiter(s) Nebulizer every 6 hours  buDESOnide    Inhalation Suspension 0.5 milliGRAM(s) Inhalation every 12 hours  chlorhexidine 2% Cloths 1 Application(s) Topical <User Schedule>  diphenoxylate/atropine 2 Tablet(s) Oral <User Schedule>  enoxaparin Injectable 40 milliGRAM(s) SubCutaneous daily  ergocalciferol 56267 Unit(s) Oral every week  loperamide 16 milliGRAM(s) Oral <User Schedule>  meropenem  IVPB 1000 milliGRAM(s) IV Intermittent every 8 hours  octreotide  Injectable 100 MICROGram(s) SubCutaneous three times a day  opium Tincture 6 milliGRAM(s) Oral <User Schedule>  pantoprazole    Tablet 40 milliGRAM(s) Oral before breakfast  psyllium Powder 1 Packet(s) Oral three times a day  sodium chloride 0.9%. 1000 milliLiter(s) (5 mL/Hr) IV Continuous <Continuous>  tamsulosin 0.4 milliGRAM(s) Oral daily      Assessment: 75 year old M  s/p ex lap, MIRYAM, closure of enterotomy, abthera vac placement     Problem/Plan - 1:  ·  Problem: Small bowel obstruction.  Plan: s/p ex lap, MIRYAM, closure of enterotomy, abthera vac placement  s/p washout  s/p PICC line placement . On TPN.      Problem/Plan - 2:  ·  Problem: Bradycardia.    Plan: No further episodes of junctional rhythm after review of telemetry strips.   Continue off AV thomas blockers.      Problem/Plan - 3:  ·  Problem: Pneumothorax.  Plan: stable  s/p VATS on 1/15.       Rest of care as per SICU.     Over 25 minutes spent on total encounter; more than 50% of the visit was spent counseling and/or coordinating care by the attending physician.      Juan Carlos Mascorro MD [facility]  Cardiovascular Disease  [phone]

## 2020-01-21 NOTE — CHART NOTE - NSCHARTNOTEFT_GEN_A_CORE
Nutrition Follow Up Note. New CALORIE COUNT reordered  - .    Patient seen for: malnutrition/TPN Team follow up.    Source: patient, medical record, 8ICU team, TPN Team rounds    Chart reviewed, events noted.     Nutrition Status: Malnutrition. Pt with 150 cm small bowel remaining after GI surgery x 3. TPN initiated . Pt has been tolerating  po diet since . TPN discontinued as of 1/15. Per 8ICU team, pt is planned for revision in early February. RN reports high ileostomy output immediately after eating. Plan to resume TPN at half goal on  (pending PICC replacement).    Pt with ongoing high ileostomy output, with fluid repletions. Rx for Lomotil, opium tincture, metamucil powder, and octreotide noted.    Fecal fat results indicate some degree of fat malabsorption. Attempts at providing high protein po diet, including supplements with favorable MCT:LCT ratio has shown limited success.     Diet: Mechanical Soft    Supplements:  Ensure Enlive (350cal, 20Gm protein per 8oz serving) x 2  Promote x 2  Prosource (60cal, 15Gm protein per serving) x 2  MCT oil 15ml     Parenteral Nutrition: Half-dose TPN (ordered ) 1200ml to infuse at 50ml/hr (60Gm amino acids, 140Gm dextrose, 30Gm SMOF lipids) to provide 1016cal/day (18cal/Kg and 1.1Gm protein/Kg per dosing wt 54.2Kg); with 10ml MVI, 3ml MTE-5.     PO Intake: Calorie Count in progress. Pt reports he enjoys Ensure but dislikes Promote and only occasionally takes Prosource. Pt has not yet tried MCT oil.    Ileostomy output: 2100ml (), 2350ml ()    Daily Weight in k.2 (-), Weight in k.9 (), Weight in k.1 (), Weight in k.6 (), Weight in k.3 (-), Weight in k.9 (-15); weight change likely reflects fluid shifts    Drug Dosing Weight  Weight (kg): 54 (15 Isma 2020 15:12)  BMI (kg/m2): 17.6 (15 Isma 2020 15:12)    Pertinent Medications: MEDICATIONS  (STANDING):  acetylcysteine 20%  Inhalation 4 milliLiter(s) Inhalation three times a day  albuterol/ipratropium for Nebulization. 3 milliLiter(s) Nebulizer every 6 hours  buDESOnide    Inhalation Suspension 0.5 milliGRAM(s) Inhalation every 12 hours  chlorhexidine 2% Cloths 1 Application(s) Topical <User Schedule>  diphenoxylate/atropine 2 Tablet(s) Oral <User Schedule>  enoxaparin Injectable 40 milliGRAM(s) SubCutaneous daily  ergocalciferol 12569 Unit(s) Oral every week  fat emulsion (Fish Oil and Plant Based) 20% Infusion 12.5 mL/Hr (12.5 mL/Hr) IV Continuous <Continuous>  loperamide 16 milliGRAM(s) Oral <User Schedule>  meropenem  IVPB 1000 milliGRAM(s) IV Intermittent every 8 hours  octreotide  Injectable 100 MICROGram(s) SubCutaneous three times a day  opium Tincture 6 milliGRAM(s) Oral <User Schedule>  pantoprazole    Tablet 40 milliGRAM(s) Oral before breakfast  Parenteral Nutrition - Adult 1 Each (50 mL/Hr) TPN Continuous <Continuous>  psyllium Powder 1 Packet(s) Oral three times a day  sodium chloride 0.9%. 1000 milliLiter(s) (5 mL/Hr) IV Continuous <Continuous>  tamsulosin 0.4 milliGRAM(s) Oral daily    MEDICATIONS  (PRN):  acetaminophen   Tablet .. 975 milliGRAM(s) Oral every 6 hours PRN Mild Pain (1 - 3)  ondansetron Injectable 4 milliGRAM(s) IV Push every 6 hours PRN Nausea and/or Vomiting    LABS:    @ 02:00: Prealbumin 13<L>   @ 00:26: Sodium 139, Potassium 4.2, Chloride 98, Calcium 7.4<L>, Magnesium 1.9, Phosphorus 1.7<L>, BUN 8, Creatinine 0.47<L>, <H>, Alk Phos 58, ALT/SGPT 12, AST/SGOT 12, Total Protein 5.0<L>, Albumin 2.1<L>, Total Bilirubin 0.5, Direct Bilirubin 0.2, Hemoglobin 7.7<L>, Hematocrit 24.7<L>    Estimated Needs: based on dosing wt 54.2Kg, with consideration for TPN, malnutrition  7291-6899 gregorio/day (25-30cal/Kg)   Gm protein/day (1.8-2.2Gm/Kg)     Previous Nutrition Diagnosis: Severe malnutrition  Nutrition Diagnosis is: ongoing, being addressed po diet, supplements, and plan to resume TPN as feasible    New Nutrition Diagnosis: none     Interventions: Resume TPN per 8ICU team; monitor results of Calorie Count    Recommend  1) TPN per TPN Team/Nutrition assessement; plan to resume  as feasible  2) Continue Mechanical Soft diet; monitor Calorie Count  3) Continue 2 servings Ensure Enlive (350cal, 20Gm protein per 8oz serving)   4) Continue 2 servings Prosource (60cal, 15Gm protein per serving)   5) Trial MCT oil 15ml/day (pt has not yet tried supplement)    Monitoring and Evaluation:     Continue to monitor nutrition provision and tolerance, weights, labs, skin integrity.    RD remains available upon request and will follow up per protocol.    Sowmya Graham, MS SPARKS CDN [facility] Pager # [phone].

## 2020-01-21 NOTE — PROGRESS NOTE ADULT - ASSESSMENT
73 y/o male presenting with high grade SBO s/p exploratory laparotomy, lysis of adhesions, decompression of bowel via enterotomy w/ primary repair, & Abthera VAC placement (12/06/2019); RTOR for re-exploration w/ Abthera VAC replacement (12/08/2019) to allow for demarcation of ischemic small bowel; RTOR for re-exploration, small bowel resection of 150 cm (150 cm remaining), ileocecetomy, end ileostomy, mucous fistula, and abdominal closure (12/10/2019); hospital course complicated by SVT, short bowel syndrome requiring repletions w/ IV fluids, malnutrition requiring TPN, intermittent episodes of hypotension, spontaneous left pneumothorax s/p pigtail catheter (12/15/2019-12/24/2019), left pleural effusion s/p pigtail catheter w/ IR (12/30/2019), dysphagia, and oral HSV lesions, placement of Pleurx on 1/10, now re-admitted to SICU with acute hypoxic respiratory failure.     Neuro: no acute issues  - Pain control as needed with acetaminophen    Resp: COPD, spontaneous left pneumothorax s/p pigtail catheter, left pleural effusion s/p Pleurx 1/10.  Intubated for respiratory distress s/p VATS 01/15 with 2 Liter of blood evacuation   - Cont chest tubes to waterseal, Follow up AM CXR.   - Pulmicort and Duoneb for COPD  - Will need outpatient follow-up with a pulmonologist for his COPD as patient does not currently have one  - Follow up CXR    CV: Bradycardia, SVT.  - No interval dysrhythmias   - Monitor vital signs    GI: s/p exploratory laparotomy, Grecia, decompression of bowel via enterotomy w/ primary repair, & Abthera VAC placement (12/06/2019); re-exploration w/ ABthera VAC replacement (12/08/2019); re-exploration, small bowel resection of 150 cm (150 cm remaining), ileocecectomy end ileostomy, mucous fistula, and abdominal closure (12/10/2019); short bowel syndrome, malnutrition, dysphagia  - Dysphagia diet   - Continue Lomotil, tincture of opium, loperamide, and octreotide for short bowel syndrome  - Protonix to decrease gastric secretions  - Continue 0.25/1 ostomy repletions  - Follow up nutrition labs.     Renal: no acute issues  - Monitor I&Os  - Monitor electrolytes and replete as necessary  - rosas catheter in place    Heme: anemia likely secondary to malnutrition / malabsorption  - Monitor CBC and coags  - Q12 CBCs   - Follow up anemia labs  - Lovenox for VTE prophylaxis    ID: oral HSV lesions (s/p acyclovir 12/23/2019-01/02/2020)  - Combicath 1/16 with pseudomonas resistant to Zosyn, started on meropenem.   - Blood cx 1/16 NGTD.     Endo: no acute issues    Disposition:  - SICU

## 2020-01-21 NOTE — PROGRESS NOTE ADULT - ATTENDING COMMENTS
Dr. Carroll (Attending Physician)  Acute resp insufficiency - COPD, with left pleural effusion still with significant chest tube output, right pleural effusion vs. pna improved slightly, thick secretions started mucomyst  high output ostomy with 2350 output for 24 hours on maximal therapy  .25:1 mL repletions, good UO  meropenem for pseudomonas  New PICC from IR restart TPN for high output ostomy, prealbumin decreased to 13 will restart tpn

## 2020-01-21 NOTE — PROGRESS NOTE ADULT - SUBJECTIVE AND OBJECTIVE BOX
[facility] NUTRITION SUPPORT / TPN -- FOLLOW UP NOTE  --------------------------------------------------------------------------------    24 hour events/subjective:  Surgery plans to reverse Ileostomy in early February-pt w/ high output ostomy  Plan for new PICC from IR restart TPN for high output ostomy  Concern for malabsorption - trial of increasing PO protein/ fats to compensate for losses is <1wk       Limited success with PO nutritional supplements - pt likes Ensure, +/- Prosource, dislikes Promote and             hasn't tried MCT oil.         Fecal Fat test noted with elevated quantity       prealbumin/ albumin decreasing   Ileostomy output- increased  ostomy output with return to diet              2350 output for 24 hours             replacement with 0.25 cc per cc output with NS            continue lomotil, imodium, octreotide, tincture of opium at max doses  Persistent Lt pleural effusion & COPD-  still with significant chest tube output,             right pleural effusion vs. pna improved slightly, thick secretions started mucomyst            currently no cough/ cp/palp/ sob/ dyspnea  Leukocytosis- Meropenem as per ID  No  n/v  Pt denies any abdominal pain or urinary complaints  No f/c/s      Diet:  Diet, Mechanical Soft:   No Carb Prosource (1pkg = 15gms Protein)     Qty per Day:  2  MCT Oil (HNT Only)     Qty per Day:  15mL  Supplement Feeding Modality:  Oral  Ensure Enlive Cans or Servings Per Day:  2       Frequency:  Daily  Promote Cans or Servings Per Day:  2       Frequency:  Daily (01-17-20 @ 12:44)      Appetite: [  ]Poor [x  ]Adequate [  ]Good  Caloric intake:  [   ]  Adequate   [  x ] Inadequate    ROS: General/ GI see HPI  all other systems negative       ALLERGIES & MEDICATIONS  --------------------------------------------------------------------------------  ALLERGIES  IV Contrast (Hives)    STANDING INPATIENT MEDICATIONS    acetylcysteine 20%  Inhalation 4 milliLiter(s) Inhalation three times a day  albuterol/ipratropium for Nebulization. 3 milliLiter(s) Nebulizer every 6 hours  buDESOnide    Inhalation Suspension 0.5 milliGRAM(s) Inhalation every 12 hours  chlorhexidine 2% Cloths 1 Application(s) Topical <User Schedule>  diphenoxylate/atropine 2 Tablet(s) Oral <User Schedule>  enoxaparin Injectable 40 milliGRAM(s) SubCutaneous daily  ergocalciferol 37401 Unit(s) Oral every week  fat emulsion (Fish Oil and Plant Based) 20% Infusion 12.5 mL/Hr IV Continuous <Continuous>  loperamide 16 milliGRAM(s) Oral <User Schedule>  meropenem  IVPB 1000 milliGRAM(s) IV Intermittent every 8 hours  octreotide  Injectable 100 MICROGram(s) SubCutaneous three times a day  opium Tincture 6 milliGRAM(s) Oral <User Schedule>  pantoprazole    Tablet 40 milliGRAM(s) Oral before breakfast  Parenteral Nutrition - Adult 1 Each TPN Continuous <Continuous>  psyllium Powder 1 Packet(s) Oral three times a day  sodium chloride 0.9%. 1000 milliLiter(s) IV Continuous <Continuous>  sodium phosphate IVPB 15 milliMole(s) IV Intermittent once  tamsulosin 0.4 milliGRAM(s) Oral daily      PRN INPATIENT MEDICATION  acetaminophen   Tablet .. 975 milliGRAM(s) Oral every 6 hours PRN  ondansetron Injectable 4 milliGRAM(s) IV Push every 6 hours PRN        VITALS/PHYSICAL EXAM  --------------------------------------------------------------------------------  T(C): 36.9 (01-21-20 @ 15:00), Max: 37.5 (01-20-20 @ 23:00)  HR: 99 (01-21-20 @ 16:00) (68 - 102)  BP: 136/64 (01-21-20 @ 16:00) (90/54 - 154/72)  RR: 22 (01-21-20 @ 16:00) (12 - 35)  SpO2: 100% (01-21-20 @ 16:00) (94% - 100%)  Wt(kg): --        01-20-20 @ 07:01  -  01-21-20 @ 07:00  --------------------------------------------------------  IN: 3030 mL / OUT: 5220 mL / NET: -2190 mL    01-21-20 @ 07:01  -  01-21-20 @ 16:35  --------------------------------------------------------  IN: 780 mL / OUT: 1080 mL / NET: -300 mL    PHYSICAL EXAM  --------------------------------------------------------------------------------  	Gen: guarded but stable, A&Ox3, NC O2  	HEENT: NC/AT, PERRL, supple neck, trachea midline, mucosa moist              Chest:  decreased BS Lt base; Lt chest w/ CT x2 to wall suction (apex w/ serosanguinous drainage,                     base w/more sanguinous than serous drainage)  	GI: (+) BS, softly distended, non tender                    midline incision c/d/i w/o drainage                   (+)ostomy pink viable- thick liquidy stool              MSK: FROM x4, no contractures nor deformities  	Vascular: Equally Warm, (+)BLE mild edema,  no clubbing, cyanosis,                       BUE w/o edema, clubbing & cyanosis                       RUE PICC w/o sx infection   	Neuro: No focal deficits, intact sensation, weakened strength BUE<BLE  	Psych: Normal affect and mood        LABS/ CULTURES/ RADIOLOGY:              7.7    9.65  >-----------<  298      [01-21-20 @ 00:26]              24.7     139  |  97  |  6   ----------------------------<  118      [01-21-20 @ 15:20]  4.1   |  33  |  0.40        Ca     7.6     [01-21-20 @ 15:20]      Mg     2.1     [01-21-20 @ 15:20]      Phos  2.2     [01-21-20 @ 15:20]    TPro  5.0  /  Alb  2.1  /  TBili  0.5  /  DBili  0.2  /  AST  12  /  ALT  12  /  AlkPhos  58  [01-21-20 @ 00:26]      Prealbumin, Serum: 13 mg/dL (01-21-20 @ 02:00)  Prealbumin, Serum: 15 mg/dL (01-15-20 @ 09:01)  Prealbumin, Serum: 13 mg/dL (01-14-20 @ 07:29)  Prealbumin, Serum: 13 mg/dL (01-07-20 @ 02:35)  Prealbumin, Serum: 14 mg/dL (01-06-20 @ 07:40)    Triglycerides, Serum: 76 mg/dL (01.21.20 @ 00:26)  Triglycerides, Serum: 51 mg/dL (01.15.20 @ 01:44)  Triglycerides, Serum: 60 mg/dL (01.14.20 @ 03:31)  Triglycerides, Serum: 50 mg/dL (01.07.20 @ 01:05)  Triglycerides, Serum: 48 mg/dL (01.06.20 @ 02:44)    Vitamin B12, Serum: 1092 pg/mL (01.20.20 @ 08:32)      Vitamin D, 1, 25-Dihydroxy: 32.5 pg/mL (01.18.20 @ 04:19)  Vitamin D, 25-Hydroxy: 17.0ng/mL (01.18.20 @ 04:19)  30.0-80.0 ng/mL Optimal levels (Reference Range)  >80.0 ng/mL Toxicity possible  20.0-29.0 ng/mL Insufficiency  10.0-19.0 ng/mL Mild to Moderate Deficiency  <10.0 ng/mL Severe Deficiency  Optimal levels for 25-Hydroxy vitamin D are 30.0 ng/mL and above based  upon the Endocrine Society guidelines 2011.  However, there is a lack of  consensus on this and the [place] recommends 20.0 ng/mL and  above as optimal levels.  Vitamin D results may vary depending on the  method of analysis.  The Roche parveen e801 electrochemiluminescent  immunoassay method measures both D2 and D3.     Intact PTH: 39 pg/mL (01.18.20 @ 04:19)    PTH METHOD:   Roche Guide for Interpretation of PTH and Calcium Results                           Calcium             PTH                           MG/DL               PG/ML  Normal                   8.4-10.5            15-65  Primary  Hyperparathyroidism      >10.5               >50  Non-PTH Hypercalcemia    >10.5               0-20  Hypoparathyroidism       <8.4                0-20  Pseudohypoparathyroid    <8.4                >50  This is intended as a guide only. Factors such as sunlight exposure,  Vitamin D status and renal function should be evaluated along with  clinical presentation.    Vitamin A- pending    Vitamin E  Alpha Tocopherol, Serum 9.2 mg/L- normal   (01.18.20 @ 04:36)    beta-gamma-Tocopherol Measurement: 0.2 mg/L- low   This test was developed and its performance characteristics  determined by [employer]. It has not been cleared or  approved by the Food and Drug Administration.  Reference intervals for alpha and gamma-tocopherol  determined from National Health and Nutrition Examination  Survey, 6263-9907.   Individuals with alpha-tocopherol levels  less than 5.0 mg/L are considered vitamin E deficient.  Performed At:  [facility] [id number]  Paresh Roche MD Ph:[phone]    < from: Xray Chest 1 View- PORTABLE-Urgent (01.21.20 @ 01:33) >  Impression:    The heart is normal in size. Right pleural effusion. The left costophrenic angle is not included in this study. A left chest tube is in place. No pneumothorax. A PICC line is seen on the right and the tip is in the superior vena cava. Bibasilar pneumonia and/or atelectasis cannot be ruled out entirely.    < end of copied text >

## 2020-01-21 NOTE — PROGRESS NOTE ADULT - ASSESSMENT
75M active smoker with COPD (not on home O2), active EtOH abuse admitted 12/6 with SBO.  Hospital course complicated by CHI, delirium, short gut syndrome, pneumothorax, episodes of hypotension, hypoxia, rash, anemia and transfusion requirements.      12/6/19 - OR for ex-lap, SANDRA and decompression enterotomy with primary repair - dusky area of bowel noted  12/8/19 - RTOR for washout, areas of ischemia of concern  12/10/19 - RTOR for partial colectomy, mucous fistula, end ileostomy  12/11/19 - extubated  12/15/19 - pneumothorax s/p L CT  12/30/19 - CT by IR  1/10/2020 - VATS with pleurex catheter insertion for drainage of pleural effusion.  1/10/2020 - rash - derm - miliaria rubra due to sweat trapping on the back  1/13/20/20 - RRT - hypoxia, hypotension, leukocytosis, no fever, drop in H/H  1/15/2020 - RTOR for VATS to evacuate hematoma    After RRT, cultures sent, zosyn started, CXR with increased LLL opacity.  Given PRBC.  Elevated fungitell noted.  Significance is not clear.  Galactomannen is negative.  On TPN, fungal blood cultures are negative.      Overall, leukocytosis, elevated fungitell, pneumonia with resistant pseudomonas    Fungitell  - f/u fungal blood cultures  - hold on antifungals    Leukocytosis  - improved on meropenem (D#3 of 7)  - trend cbc    Pneumonia  - agree with meropenem - through 1/26    I have discussed plan of care as detailed above with sicu resident

## 2020-01-21 NOTE — PROGRESS NOTE ADULT - ASSESSMENT
A/P: 75 year old male w/ PMH of COPD and EtOH dependence with PSH/o appy, prostate surgery, & spine surgery  septic shock and high grade SBO s/p exploratory laparotomy, lysis of adhesions, decompression of bowel via enterotomy w/ primary repair, and Abthera VAC placement on 12/6; s/p take down of Abthera, washout and re-application of the Abthera vac on 12/8. Now s/p SBR and end ileostomy on 12/10.   TPN consulted to assist w/ management of pt's nutrition in pt w/ prolonged hospital course now tolerating diet but has high Ileostomy output.  Pt s/p Lt Chest Pigtail for effusion in IR with persistent high output on 1/10/2020 pt had VATs & placement of Left PleurX catheter. Pt op pt developed hemothorax and Respiratory Distress.  Pt is s/p Lt chest Evacuation of 2L blood w/ placement of CT x2 on 1/15/2020 with persistent Lt Pleural Effusion.    Pt remains in SICU w/ Persistent Lt Pleural Effusion, Rt Pleural Effusion vs PNA, and High Ostomy Output  Plan to Reverse Ileostomy in early February  Pt w/ improving severe Protein-Calorie Malnutrition- TPN to restart today        Restart TPN at reduced strength today.              Amino Acids 60g, Dextrose 140g, and Lipids 30g in 1200mL with 3mL MTE & 10mL MVI        TPN stopped when PICC was used for resuscitative efforts on 1/15        Plan for new PICC by IR today         Pt tolerating Soft Mechanical w/ Ensure & Prosource supplements, consider adding MTC oil 15mL                limited Trial to see if pt can meet needs w/ PO supplements               Questionable if pt can receive enough and appropriate calories               Calorie Count in progress  Fecal fat testing suggestive of decreased absorption of fat           Supplements added to compensate for losses  Vit D levels low- consider Ergocalciferol supplements  Vit A pending  Vit K INR/ PT near normal suggestive that it is being absorbed  VIt E Alpha Tocopherol- normal & beta-gamma-Tocopherol - low   High Ostomy Output and Chest Tube output w/ suspected increased protein losses  Strict Intake and Output -             Increased ostomy output- diet advanced -replete as per ICU (NS IVF 0.25:1 IVF q6h)            Continue to monitor while on octreotide, lomotil, tincture of opium, & imodium  BLE Edema- will continue to monitor - decreased Na & volume in TPN given  HypoPhos- NaPhos 45mMol in TPN & will continue to monitor   Leukocytosis- improving - Meropenum as per ID  Hyperglycemia-improving      Fingersticks & ISS coverage - as per SICU  To continue monitoring of nutrition - Weights three times a week; Daily CMP, Mg, Ionized Ca, Phosphorus,        and Weekly Triglycerides and Pre-albumin  Continue as per SICU/Surgery, will follow with you, D/w primary team    Andreina Hubbard PA-C  TPN team, pager [phone]  D/w Ana M GARCES

## 2020-01-21 NOTE — PROCEDURE NOTE - NSINFORMCONSENT_GEN_A_CORE
Benefits, risks, and possible complications of procedure explained to patient/caregiver who verbalized understanding and gave written consent.
This was an emergent procedure.
This was an emergent procedure.
Benefits, risks, and possible complications of procedure explained to patient/caregiver who verbalized understanding and gave written consent.

## 2020-01-21 NOTE — PROGRESS NOTE ADULT - ASSESSMENT
73 y/o M presenting with septic shock and high grade SBO s/p exploratory laparotomy, lysis of adhesions, decompression of bowel via enterotomy w/ primary repair, and Abthera VAC placement on 12/6; s/p take down of Abthera, washout and re-application of the Abthera vac on 12/8. Now s/p SBR and end ileostomy on 12/10 acute respiratory distress now improving, Pneumothorax, hyperglycemia, delirium. Now still with persistent pneumothorax when chest pigtail clamped.   12/23 VSS on room air recommending IR drainage of left chest  12/24 No evidence of air leak to left chest tube. Tube clamped. Repeat chest cxr in 4 hours. Anticipate chest tube removal if lung remains expanded. Drainage of loculated left effusion discussed with IR. IR to reconsult for drainage once initial tube removed. Discussed plan with Dr Mayes and IR Fellow  12/24  On NC ,    VSSchest xray sm lt pl eff, left chest tube dc'd.  12/26    2l NC   co  sob,  lt side diminshed  12/27 VSS, CXR with unchanged loculated left pleural effusion- IR consulted for pigtail placement, states effusion can be drained via thoracentesis, pigtail placement not indicated at this time - Dr. Mayes to speak with IR attending.   12/30 Patient with persistent PTX  and left pleural effusion now for IR drainage with Dr Elkins.   12/31 HD stable, L PTC w/ high output, serosanguinous drainage, 990 ml overnight/ 2200 in 24 hrs. Tolerating 2L NC supplemental O2, Cyto pending, continue monitor drainage output.   1/1: LPTC still with High output-400/24h. Continue with pigtail cath drain.  1/ 2     lt pigtail draining  on lws  1/3 HD stable, left pigtail catheter continues to drain. Incentive spirometer encouraged, OOB and mobility, continue pulmonary toileting. Maintain chest tube to suction and monitor output.   1/4: Chest tube milked to prevent clogging of tube, Continues to drain with high out put.  1/5 remains with high out put 170/700. Continue drainage  1/6  persistent drainage from lt pl tube  1/7 Left pigtail 150/450. Daily CXR. Planning for pleurex cath placement on Friday 1/10 w/ Dr. Mayes  1/8        Lt pleural tube   persistent draining    1/9 preop for pleurX placement tomorrow. CT clamped. NPO after MN, type and cross x2.   1/10 s/p VATS L pleurx cath placement   1/11 HD stable, no resp distress, maintain L pleurex to suction overnight, CXR in AM.   1/12 pleurex waterseal  1/13   RRT  called for respiratory distress   plx to pleurvac   back to lws   min drainage  no a/l  1/14 VSS, Left pleurx catheter minimal drainage 10ml/24h, CXR: small left pleural effusion with left pleurx catheter in place.     1/15 Overnight episode of bradycardia and respiratory distress that stabilized with BiPap support, concerned for anemia requiring 2 U PRBC and increase in left pleural effusion, minimal drainage in pleurx.  1/15 s/p bronch, Left VATS. Evacuation of 2L blood from L chest, pleurX removal, chest ubes x2  1/16 HD stable, minimal vasopressors, CXR stable, currently SBT, CT x2 remains to suction, draining.   1/17 HD stable, CXR stable, will water seal chest tubes today, repeat CXR in AM. Cont to monitor drainage.   1/18: L apical chest tube with small AL. CXr appear stable tiny left apical PTX seen on cxr. Continue chest tube to water seal for now as d/w Dr. Mayes today.   1/19 Remains with high out put from left apical. Will continue both chest tubes for today as d/w Dr. Mayes/Luis  1/20 Still with high output drainage remains serosanguenos. Continue drains for today  1/21  vss ,    2 chest tubes continue to drain

## 2020-01-21 NOTE — PROGRESS NOTE ADULT - ASSESSMENT
76 y/o M presenting with septic shock and high grade SBO s/p exploratory laparotomy, lysis of adhesions, decompression of bowel via enterotomy w/ primary repair, and Abthera VAC placement on 12/6; s/p take down of Abthera, washout and re-application of the Abthera vac on 12/8. 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 s/p L chest tube placement by IR 12/30 for pleural effusion now s/p VATS, with chest tube insertion for drainage of pleural effusion. RRT called 1/13 AM for hypoxia, patient transferred back to SICU.  Patient continued SOB, chest drain possible obstruction expanding. Patent taken back to OR emergently 1/15 for clot evac and VATS.     - Appreciate Continued SICU care  - Diet as tolerated  - Continue Lomotil, loperamide, tincture of opium, octreotide  - Follow up CT surgery regarding chest tubes  - F/U

## 2020-01-21 NOTE — PROGRESS NOTE ADULT - SUBJECTIVE AND OBJECTIVE BOX
24 HOUR EVENTS:   - R femoral a-line discontinued.   - No acute events overnight.     Patient seen and examined. No overnight events. Patient feels well.  pain controlled, denies f/c/n/v/d.  high ostomy outputs    SUBJECTIVE    OVERNIGHT EVENTS:    10-point review of systems completed and negative except as noted above.      OBJECTIVE    MEDICATIONS  acetaminophen   Tablet .. 975 milliGRAM(s) Oral every 6 hours PRN  acetylcysteine 20%  Inhalation 4 milliLiter(s) Inhalation three times a day  albuterol/ipratropium for Nebulization. 3 milliLiter(s) Nebulizer every 6 hours  buDESOnide    Inhalation Suspension 0.5 milliGRAM(s) Inhalation every 12 hours  chlorhexidine 2% Cloths 1 Application(s) Topical <User Schedule>  diphenoxylate/atropine 2 Tablet(s) Oral <User Schedule>  enoxaparin Injectable 40 milliGRAM(s) SubCutaneous daily  ergocalciferol 87175 Unit(s) Oral every week  loperamide 16 milliGRAM(s) Oral <User Schedule>  meropenem  IVPB 1000 milliGRAM(s) IV Intermittent every 8 hours  octreotide  Injectable 100 MICROGram(s) SubCutaneous three times a day  ondansetron Injectable 4 milliGRAM(s) IV Push every 6 hours PRN  opium Tincture 6 milliGRAM(s) Oral <User Schedule>  pantoprazole    Tablet 40 milliGRAM(s) Oral before breakfast  psyllium Powder 1 Packet(s) Oral three times a day  sodium chloride 0.9%. 1000 milliLiter(s) IV Continuous <Continuous>  tamsulosin 0.4 milliGRAM(s) Oral daily      PHYSICAL EXAM  T(C): 36.9 (01-21-20 @ 07:00), Max: 37.5 (01-20-20 @ 23:00)  HR: 88 (01-21-20 @ 09:12) (68 - 102)  BP: 91/50 (01-21-20 @ 08:00) (90/54 - 150/77)  RR: 12 (01-21-20 @ 08:00) (12 - 35)  SpO2: 100% (01-21-20 @ 09:12) (92% - 100%)    01-20-20 @ 07:01  -  01-21-20 @ 07:00  --------------------------------------------------------  IN: 3030 mL / OUT: 5220 mL / NET: -2190 mL    01-21-20 @ 07:01  - 01-21-20 @ 09:58  --------------------------------------------------------  IN: 0 mL / OUT: 280 mL / NET: -280 mL        General: Appears well, NAD  Neuro: AAOx3  CHEST: Clear to auscultation bilaterally, chest tubes in place  CV: Regular rate and rhythm  Abdomen: soft, nontender, nondistended, no rebound or guarding, ostomy intact, pink, draining green semisolid  Extremities: Grossly symmetric    LABS                        7.7    9.65  )-----------( 298      ( 21 Jan 2020 00:26 )             24.7     01-21    139  |  98  |  8   ----------------------------<  122<H>  4.2   |  36<H>  |  0.47<L>    Ca    7.4<L>      21 Jan 2020 00:26  Phos  1.7     01-21  Mg     1.9     01-21    TPro  5.0<L>  /  Alb  2.1<L>  /  TBili  0.5  /  DBili  0.2  /  AST  12  /  ALT  12  /  AlkPhos  58  01-21          RADIOLOGY & ADDITIONAL STUDIES

## 2020-01-22 LAB
ALBUMIN SERPL ELPH-MCNC: 2.3 G/DL — LOW (ref 3.3–5)
ALP SERPL-CCNC: 66 U/L — SIGNIFICANT CHANGE UP (ref 40–120)
ALT FLD-CCNC: 14 U/L — SIGNIFICANT CHANGE UP (ref 10–45)
ANION GAP SERPL CALC-SCNC: 4 MMOL/L — LOW (ref 5–17)
ANION GAP SERPL CALC-SCNC: 7 MMOL/L — SIGNIFICANT CHANGE UP (ref 5–17)
AST SERPL-CCNC: 12 U/L — SIGNIFICANT CHANGE UP (ref 10–40)
BILIRUB DIRECT SERPL-MCNC: 0.2 MG/DL — SIGNIFICANT CHANGE UP (ref 0–0.2)
BILIRUB INDIRECT FLD-MCNC: 0.2 MG/DL — SIGNIFICANT CHANGE UP (ref 0.2–1)
BILIRUB SERPL-MCNC: 0.4 MG/DL — SIGNIFICANT CHANGE UP (ref 0.2–1.2)
BUN SERPL-MCNC: 6 MG/DL — LOW (ref 7–23)
BUN SERPL-MCNC: 7 MG/DL — SIGNIFICANT CHANGE UP (ref 7–23)
CALCIUM SERPL-MCNC: 7.5 MG/DL — LOW (ref 8.4–10.5)
CALCIUM SERPL-MCNC: 7.9 MG/DL — LOW (ref 8.4–10.5)
CHLORIDE SERPL-SCNC: 101 MMOL/L — SIGNIFICANT CHANGE UP (ref 96–108)
CHLORIDE SERPL-SCNC: 97 MMOL/L — SIGNIFICANT CHANGE UP (ref 96–108)
CO2 SERPL-SCNC: 34 MMOL/L — HIGH (ref 22–31)
CO2 SERPL-SCNC: 37 MMOL/L — HIGH (ref 22–31)
CREAT ?TM UR-MCNC: 13 MG/DL — SIGNIFICANT CHANGE UP
CREAT SERPL-MCNC: 0.44 MG/DL — LOW (ref 0.5–1.3)
CREAT SERPL-MCNC: 0.47 MG/DL — LOW (ref 0.5–1.3)
GLUCOSE BLDC GLUCOMTR-MCNC: 108 MG/DL — HIGH (ref 70–99)
GLUCOSE BLDC GLUCOMTR-MCNC: 129 MG/DL — HIGH (ref 70–99)
GLUCOSE BLDC GLUCOMTR-MCNC: 136 MG/DL — HIGH (ref 70–99)
GLUCOSE BLDC GLUCOMTR-MCNC: 142 MG/DL — HIGH (ref 70–99)
GLUCOSE SERPL-MCNC: 105 MG/DL — HIGH (ref 70–99)
GLUCOSE SERPL-MCNC: 139 MG/DL — HIGH (ref 70–99)
HCT VFR BLD CALC: 26.1 % — LOW (ref 39–50)
HGB BLD-MCNC: 7.9 G/DL — LOW (ref 13–17)
MAGNESIUM SERPL-MCNC: 1.9 MG/DL — SIGNIFICANT CHANGE UP (ref 1.6–2.6)
MAGNESIUM SERPL-MCNC: 2 MG/DL — SIGNIFICANT CHANGE UP (ref 1.6–2.6)
MCHC RBC-ENTMCNC: 30.3 GM/DL — LOW (ref 32–36)
MCHC RBC-ENTMCNC: 30.5 PG — SIGNIFICANT CHANGE UP (ref 27–34)
MCV RBC AUTO: 100.8 FL — HIGH (ref 80–100)
NRBC # BLD: 0 /100 WBCS — SIGNIFICANT CHANGE UP (ref 0–0)
OSMOLALITY SERPL: 289 MOSMOL/KG — SIGNIFICANT CHANGE UP (ref 280–301)
OSMOLALITY UR: 252 MOS/KG — LOW (ref 300–900)
PHOSPHATE SERPL-MCNC: 2 MG/DL — LOW (ref 2.5–4.5)
PHOSPHATE SERPL-MCNC: 3 MG/DL — SIGNIFICANT CHANGE UP (ref 2.5–4.5)
PLATELET # BLD AUTO: 308 K/UL — SIGNIFICANT CHANGE UP (ref 150–400)
POTASSIUM SERPL-MCNC: 4.2 MMOL/L — SIGNIFICANT CHANGE UP (ref 3.5–5.3)
POTASSIUM SERPL-MCNC: 4.4 MMOL/L — SIGNIFICANT CHANGE UP (ref 3.5–5.3)
POTASSIUM SERPL-SCNC: 4.2 MMOL/L — SIGNIFICANT CHANGE UP (ref 3.5–5.3)
POTASSIUM SERPL-SCNC: 4.4 MMOL/L — SIGNIFICANT CHANGE UP (ref 3.5–5.3)
PROT SERPL-MCNC: 5.4 G/DL — LOW (ref 6–8.3)
RBC # BLD: 2.59 M/UL — LOW (ref 4.2–5.8)
RBC # FLD: 14.1 % — SIGNIFICANT CHANGE UP (ref 10.3–14.5)
SODIUM SERPL-SCNC: 138 MMOL/L — SIGNIFICANT CHANGE UP (ref 135–145)
SODIUM SERPL-SCNC: 142 MMOL/L — SIGNIFICANT CHANGE UP (ref 135–145)
SODIUM UR-SCNC: 87 MMOL/L — SIGNIFICANT CHANGE UP
WBC # BLD: 7.68 K/UL — SIGNIFICANT CHANGE UP (ref 3.8–10.5)
WBC # FLD AUTO: 7.68 K/UL — SIGNIFICANT CHANGE UP (ref 3.8–10.5)

## 2020-01-22 PROCEDURE — 99232 SBSQ HOSP IP/OBS MODERATE 35: CPT

## 2020-01-22 PROCEDURE — 71045 X-RAY EXAM CHEST 1 VIEW: CPT | Mod: 26,77

## 2020-01-22 PROCEDURE — 99024 POSTOP FOLLOW-UP VISIT: CPT

## 2020-01-22 PROCEDURE — 99233 SBSQ HOSP IP/OBS HIGH 50: CPT | Mod: GC

## 2020-01-22 PROCEDURE — 71045 X-RAY EXAM CHEST 1 VIEW: CPT | Mod: 26

## 2020-01-22 RX ORDER — I.V. FAT EMULSION 20 G/100ML
12.5 EMULSION INTRAVENOUS
Qty: 30 | Refills: 0 | Status: DISCONTINUED | OUTPATIENT
Start: 2020-01-22 | End: 2020-01-23

## 2020-01-22 RX ORDER — ELECTROLYTE SOLUTION,INJ
1 VIAL (ML) INTRAVENOUS
Refills: 0 | Status: DISCONTINUED | OUTPATIENT
Start: 2020-01-22 | End: 2020-01-22

## 2020-01-22 RX ADMIN — Medication 3 MILLILITER(S): at 05:03

## 2020-01-22 RX ADMIN — Medication 4 MILLILITER(S): at 05:03

## 2020-01-22 RX ADMIN — I.V. FAT EMULSION 12.5 ML/HR: 20 EMULSION INTRAVENOUS at 17:22

## 2020-01-22 RX ADMIN — Medication 250 MILLIMOLE(S): at 18:11

## 2020-01-22 RX ADMIN — Medication 1 PACKET(S): at 05:19

## 2020-01-22 RX ADMIN — ENOXAPARIN SODIUM 40 MILLIGRAM(S): 100 INJECTION SUBCUTANEOUS at 13:10

## 2020-01-22 RX ADMIN — Medication 1 PACKET(S): at 13:10

## 2020-01-22 RX ADMIN — MORPHINE 6 MILLIGRAM(S): 10 SOLUTION ORAL at 09:39

## 2020-01-22 RX ADMIN — Medication 16 MILLIGRAM(S): at 23:10

## 2020-01-22 RX ADMIN — Medication 0.5 MILLIGRAM(S): at 17:20

## 2020-01-22 RX ADMIN — Medication 3 MILLILITER(S): at 17:20

## 2020-01-22 RX ADMIN — Medication 3 MILLILITER(S): at 12:21

## 2020-01-22 RX ADMIN — MEROPENEM 100 MILLIGRAM(S): 1 INJECTION INTRAVENOUS at 13:10

## 2020-01-22 RX ADMIN — Medication 1 PACKET(S): at 23:09

## 2020-01-22 RX ADMIN — Medication 2 TABLET(S): at 14:07

## 2020-01-22 RX ADMIN — Medication 16 MILLIGRAM(S): at 14:07

## 2020-01-22 RX ADMIN — CHLORHEXIDINE GLUCONATE 1 APPLICATION(S): 213 SOLUTION TOPICAL at 05:19

## 2020-01-22 RX ADMIN — OCTREOTIDE ACETATE 100 MICROGRAM(S): 200 INJECTION, SOLUTION INTRAVENOUS; SUBCUTANEOUS at 05:19

## 2020-01-22 RX ADMIN — Medication 0.5 MILLIGRAM(S): at 05:03

## 2020-01-22 RX ADMIN — OCTREOTIDE ACETATE 100 MICROGRAM(S): 200 INJECTION, SOLUTION INTRAVENOUS; SUBCUTANEOUS at 23:11

## 2020-01-22 RX ADMIN — MORPHINE 6 MILLIGRAM(S): 10 SOLUTION ORAL at 23:20

## 2020-01-22 RX ADMIN — MORPHINE 6 MILLIGRAM(S): 10 SOLUTION ORAL at 18:10

## 2020-01-22 RX ADMIN — MORPHINE 6 MILLIGRAM(S): 10 SOLUTION ORAL at 14:07

## 2020-01-22 RX ADMIN — Medication 1 EACH: at 17:21

## 2020-01-22 RX ADMIN — PANTOPRAZOLE SODIUM 40 MILLIGRAM(S): 20 TABLET, DELAYED RELEASE ORAL at 09:29

## 2020-01-22 RX ADMIN — Medication 16 MILLIGRAM(S): at 09:30

## 2020-01-22 RX ADMIN — Medication 2 TABLET(S): at 18:10

## 2020-01-22 RX ADMIN — Medication 2 TABLET(S): at 23:10

## 2020-01-22 RX ADMIN — TAMSULOSIN HYDROCHLORIDE 0.4 MILLIGRAM(S): 0.4 CAPSULE ORAL at 13:10

## 2020-01-22 RX ADMIN — Medication 975 MILLIGRAM(S): at 06:24

## 2020-01-22 RX ADMIN — Medication 975 MILLIGRAM(S): at 06:54

## 2020-01-22 RX ADMIN — Medication 16 MILLIGRAM(S): at 18:11

## 2020-01-22 RX ADMIN — OCTREOTIDE ACETATE 100 MICROGRAM(S): 200 INJECTION, SOLUTION INTRAVENOUS; SUBCUTANEOUS at 13:11

## 2020-01-22 RX ADMIN — Medication 2 TABLET(S): at 09:28

## 2020-01-22 RX ADMIN — MEROPENEM 100 MILLIGRAM(S): 1 INJECTION INTRAVENOUS at 05:20

## 2020-01-22 RX ADMIN — MEROPENEM 100 MILLIGRAM(S): 1 INJECTION INTRAVENOUS at 23:10

## 2020-01-22 RX ADMIN — Medication 4 MILLILITER(S): at 12:20

## 2020-01-22 NOTE — PROGRESS NOTE ADULT - ASSESSMENT
75M active smoker with COPD (not on home O2), active EtOH abuse admitted 12/6 with SBO.  Hospital course complicated by CHI, delirium, short gut syndrome, pneumothorax, episodes of hypotension, hypoxia, rash, anemia and transfusion requirements.      12/6/19 - OR for ex-lap, SANDRA and decompression enterotomy with primary repair - dusky area of bowel noted  12/8/19 - RTOR for washout, areas of ischemia of concern  12/10/19 - RTOR for partial colectomy, mucous fistula, end ileostomy  12/11/19 - extubated  12/15/19 - pneumothorax s/p L CT  12/30/19 - CT by IR  1/10/2020 - VATS with pleurex catheter insertion for drainage of pleural effusion.  1/10/2020 - rash - derm - miliaria rubra due to sweat trapping on the back  1/13/20/20 - RRT - hypoxia, hypotension, leukocytosis, no fever, drop in H/H  1/15/2020 - RTOR for VATS to evacuate hematoma    After RRT, cultures sent, zosyn started, CXR with increased LLL opacity.  Given PRBC.  Elevated fungitell noted.  Significance is not clear.  Galactomannen is negative.  On TPN, fungal blood cultures are negative.      Overall, leukocytosis, elevated fungitell, pneumonia with resistant pseudomonas    Fungitell  - f/u fungal blood cultures  - hold on antifungals    Leukocytosis  - improved on meropenem (D#4 of 7)  - trend cbc    Pneumonia  - agree with meropenem - through 1/26    Please call Infectious Diseases if there is a change in status.  Thank you.  [phone].

## 2020-01-22 NOTE — PROGRESS NOTE ADULT - ASSESSMENT
A/P: 75 year old male w/ PMH of COPD and EtOH dependence with PSH/o appy, prostate surgery, & spine surgery  septic shock and high grade SBO s/p exploratory laparotomy, lysis of adhesions, decompression of bowel via enterotomy w/ primary repair, and Abthera VAC placement on 12/6; s/p take down of Abthera, washout and re-application of the Abthera vac on 12/8. Now s/p SBR and end ileostomy on 12/10.   TPN consulted to assist w/ management of pt's nutrition in pt w/ prolonged hospital course now tolerating diet but has high Ileostomy output.  Pt s/p Lt Chest Pigtail for effusion in IR with persistent high output on 1/10/2020 pt had VATs & placement of Left PleurX catheter. Pt op pt developed hemothorax and Respiratory Distress.  Pt is s/p Lt chest Evacuation of 2L blood w/ placement of CT x2 on 1/15/2020 with persistent Lt Pleural Effusion.    Pt remains in SICU w/ Persistent Lt Pleural Effusion, Rt Pleural Effusion vs PNA, and High Ostomy Output  Plan to Reverse Ileostomy in early February  Pt w/ improving severe Protein-Calorie Malnutrition-         TPN restarted at 1/2 Goal- pt eating sufficiently to make up approximately 1/2 of nutritional goals            Amino Acids 60g, Dextrose 140g, and Lipids 30g in 1200mL with 3mL MTE & 10mL MVI        Pt tolerating Soft Mechanical w/ Ensure & Prosource supplements, consider adding MTC oil 15mL              Calorie Count to finish             High Ostomy Output and Chest Tube output w/ suspected increased protein losses  Fecal fat testing suggestive of decreased absorption of fat           Supplements added to compensate for losses  Vit D levels low- consider Ergocalciferol supplements  Vit A pending  Vit K INR/ PT near normal suggestive that it is being absorbed  VIt E Alpha Tocopherol- normal & beta-gamma-Tocopherol - low   Strict Intake and Output -             Increased ostomy output- diet advanced -replete as per ICU (NS IVF 0.25:1 IVF q6h)            Continue to monitor while on octreotide, lomotil, tincture of opium, & imodium  BLE Edema- will continue to monitor - decreased Na & volume in TPN given  HypoPhos- improving w/ NaPhos 45mMol in TPN & will continue to monitor   Leukocytosis- improving - Meropenum as per ID  Hyperglycemia-improving      Fingersticks & ISS coverage - as per SICU  To continue monitoring of nutrition - Weights three times a week; Daily CMP, Mg, Ionized Ca, Phosphorus,        and Weekly Triglycerides and Pre-albumin  Continue as per SICU/Surgery, will follow with you, D/w primary team    Andreina Hubbard PA-C  TPN team, pager [phone]  D/w Ana M Chacko & MU GARCES A/P: 75 year old male w/ PMH of COPD and EtOH dependence with PSH/o appy, prostate surgery, & spine surgery  septic shock and high grade SBO s/p exploratory laparotomy, lysis of adhesions, decompression of bowel via enterotomy w/ primary repair, and Abthera VAC placement on 12/6; s/p take down of Abthera, washout and re-application of the Abthera vac on 12/8. Now s/p SBR and end ileostomy on 12/10.   TPN consulted to assist w/ management of pt's nutrition in pt w/ prolonged hospital course now tolerating diet but has high Ileostomy output.  Pt s/p Lt Chest Pigtail for effusion in IR with persistent high output on 1/10/2020 pt had VATs & placement of Left PleurX catheter. Pt op pt developed hemothorax and Respiratory Distress.  Pt is s/p Lt chest Evacuation of 2L blood w/ placement of CT x2 on 1/15/2020 with persistent Lt Pleural Effusion.    Pt remains in SICU w/ Persistent Lt Pleural Effusion, Rt Pleural Effusion vs PNA, and High Ostomy Output  Plan to Reverse Ileostomy in early February  Pt w/ improving severe Protein-Calorie Malnutrition-         TPN restarted at 1/2 Goal- pt eating sufficiently to make up approximately 1/2 of nutritional goals            Amino Acids 60g, Dextrose 140g, and Lipids 30g in 1200mL with 3mL MTE & 10mL MVI        Pt tolerating Soft Mechanical w/ Ensure & Prosource supplements, consider adding MTC oil 15mL              Calorie Count to finish             High Ostomy Output and Chest Tube output w/ suspected increased protein losses  Fecal fat testing suggestive of decreased absorption of fat           Supplements added to compensate for losses  Vit D levels low- consider Ergocalciferol supplements  Vit A pending  Vit K INR/ PT near normal suggestive that it is being absorbed  VIt E Alpha Tocopherol- normal & beta-gamma-Tocopherol - low   Strict Intake and Output -             Increased ostomy output- diet advanced -replete as per ICU (NS IVF 0.25:1 IVF q6h)            Continue to monitor while on octreotide, lomotil, tincture of opium, & imodium  BLE Edema- will continue to monitor - decreased Na & volume in TPN given  HypoPhos- improving w/ NaPhos 45mMol in TPN & will continue to monitor   Leukocytosis- improving - Meropenum as per ID  Hyperglycemia-improving      Fingersticks & ISS coverage - as per SICU  To continue monitoring of nutrition - Weights three times a week; Daily CMP, Mg, Ionized Ca, Phosphorus,        and Weekly Triglycerides and Pre-albumin  Continue as per SICU/Surgery, will follow with you, D/w primary team    Andreina Hubbard PA-C  TPN team, pager [phone]  D/w Dr MU Siegel

## 2020-01-22 NOTE — PROGRESS NOTE ADULT - ASSESSMENT
75 y/o M presenting with septic shock and high grade SBO s/p exploratory laparotomy, lysis of adhesions, decompression of bowel via enterotomy w/ primary repair, and Abthera VAC placement on 12/6; s/p take down of Abthera, washout and re-application of the Abthera vac on 12/8. Now s/p SBR and end ileostomy on 12/10 acute respiratory distress now improving, Pneumothorax, hyperglycemia, delirium. Now still with persistent pneumothorax when chest pigtail clamped.   12/23 VSS on room air recommending IR drainage of left chest  12/24 No evidence of air leak to left chest tube. Tube clamped. Repeat chest cxr in 4 hours. Anticipate chest tube removal if lung remains expanded. Drainage of loculated left effusion discussed with IR. IR to reconsult for drainage once initial tube removed. Discussed plan with Dr Mayes and IR Fellow  12/24  On NC ,    VSSchest xray sm lt pl eff, left chest tube dc'd.  12/26    2l NC   co  sob,  lt side diminshed  12/27 VSS, CXR with unchanged loculated left pleural effusion- IR consulted for pigtail placement, states effusion can be drained via thoracentesis, pigtail placement not indicated at this time - Dr. Mayes to speak with IR attending.   12/30 Patient with persistent PTX  and left pleural effusion now for IR drainage with Dr Elkins.   12/31 HD stable, L PTC w/ high output, serosanguinous drainage, 990 ml overnight/ 2200 in 24 hrs. Tolerating 2L NC supplemental O2, Cyto pending, continue monitor drainage output.   1/1: LPTC still with High output-400/24h. Continue with pigtail cath drain.  1/ 2     lt pigtail draining  on lws  1/3 HD stable, left pigtail catheter continues to drain. Incentive spirometer encouraged, OOB and mobility, continue pulmonary toileting. Maintain chest tube to suction and monitor output.   1/4: Chest tube milked to prevent clogging of tube, Continues to drain with high out put.  1/5 remains with high out put 170/700. Continue drainage  1/6  persistent drainage from lt pl tube  1/7 Left pigtail 150/450. Daily CXR. Planning for pleurex cath placement on Friday 1/10 w/ Dr. Mayes  1/8        Lt pleural tube   persistent draining    1/9 preop for pleurX placement tomorrow. CT clamped. NPO after MN, type and cross x2.   1/10 s/p VATS L pleurx cath placement   1/11 HD stable, no resp distress, maintain L pleurex to suction overnight, CXR in AM.   1/12 pleurex waterseal  1/13   RRT  called for respiratory distress   plx to pleurvac   back to lws   min drainage  no a/l  1/14 VSS, Left pleurx catheter minimal drainage 10ml/24h, CXR: small left pleural effusion with left pleurx catheter in place.     1/15 Overnight episode of bradycardia and respiratory distress that stabilized with BiPap support, concerned for anemia requiring 2 U PRBC and increase in left pleural effusion, minimal drainage in pleurx.  1/15 s/p bronch, Left VATS. Evacuation of 2L blood from L chest, pleurX removal, chest ubes x2  1/16 HD stable, minimal vasopressors, CXR stable, currently SBT, CT x2 remains to suction, draining.   1/17 HD stable, CXR stable, will water seal chest tubes today, repeat CXR in AM. Cont to monitor drainage.   1/18: L apical chest tube with small AL. CXr appear stable tiny left apical PTX seen on cxr. Continue chest tube to water seal for now as d/w Dr. Mayes today.   1/19 Remains with high out put from left apical. Will continue both chest tubes for today as d/w Dr. Mayes/Luis  1/20 Still with high output drainage remains serosanguenos. Continue drains for today  1/21  vss ,    2 chest tubes continue to drain 73 y/o M presenting with septic shock and high grade SBO s/p exploratory laparotomy, lysis of adhesions, decompression of bowel via enterotomy w/ primary repair, and Abthera VAC placement on 12/6; s/p take down of Abthera, washout and re-application of the Abthera vac on 12/8. Now s/p SBR and end ileostomy on 12/10 acute respiratory distress now improving, Pneumothorax, hyperglycemia, delirium. Now still with persistent pneumothorax when chest pigtail clamped.   12/23 VSS on room air recommending IR drainage of left chest  12/24 No evidence of air leak to left chest tube. Tube clamped. Repeat chest cxr in 4 hours. Anticipate chest tube removal if lung remains expanded. Drainage of loculated left effusion discussed with IR. IR to reconsult for drainage once initial tube removed. Discussed plan with Dr Mayes and IR Fellow  12/24  On NC ,    VSSchest xray sm lt pl eff, left chest tube dc'd.  12/26    2l NC   co  sob,  lt side diminshed  12/27 VSS, CXR with unchanged loculated left pleural effusion- IR consulted for pigtail placement, states effusion can be drained via thoracentesis, pigtail placement not indicated at this time - Dr. Mayes to speak with IR attending.   12/30 Patient with persistent PTX  and left pleural effusion now for IR drainage with Dr Elkins.   12/31 HD stable, L PTC w/ high output, serosanguinous drainage, 990 ml overnight/ 2200 in 24 hrs. Tolerating 2L NC supplemental O2, Cyto pending, continue monitor drainage output.   1/1: LPTC still with High output-400/24h. Continue with pigtail cath drain.  1/ 2     lt pigtail draining  on lws  1/3 HD stable, left pigtail catheter continues to drain. Incentive spirometer encouraged, OOB and mobility, continue pulmonary toileting. Maintain chest tube to suction and monitor output.   1/4: Chest tube milked to prevent clogging of tube, Continues to drain with high out put.  1/5 remains with high out put 170/700. Continue drainage  1/6  persistent drainage from lt pl tube  1/7 Left pigtail 150/450. Daily CXR. Planning for pleurex cath placement on Friday 1/10 w/ Dr. Mayes  1/8        Lt pleural tube   persistent draining    1/9 preop for pleurX placement tomorrow. CT clamped. NPO after MN, type and cross x2.   1/10 s/p VATS L pleurx cath placement   1/11 HD stable, no resp distress, maintain L pleurex to suction overnight, CXR in AM.   1/12 pleurex waterseal  1/13   RRT  called for respiratory distress   plx to pleurvac   back to lws   min drainage  no a/l  1/14 VSS, Left pleurx catheter minimal drainage 10ml/24h, CXR: small left pleural effusion with left pleurx catheter in place.     1/15 Overnight episode of bradycardia and respiratory distress that stabilized with BiPap support, concerned for anemia requiring 2 U PRBC and increase in left pleural effusion, minimal drainage in pleurx.  1/15 s/p bronch, Left VATS. Evacuation of 2L blood from L chest, pleurX removal, chest ubes x2  1/16 HD stable, minimal vasopressors, CXR stable, currently SBT, CT x2 remains to suction, draining.   1/17 HD stable, CXR stable, will water seal chest tubes today, repeat CXR in AM. Cont to monitor drainage.   1/18: L apical chest tube with small AL. CXr appear stable tiny left apical PTX seen on cxr. Continue chest tube to water seal for now as d/w Dr. Mayes today.   1/19 Remains with high out put from left apical. Will continue both chest tubes for today as d/w Dr. Mayes/Luis  1/20 Still with high output drainage remains serosanguenos. Continue drains for today  1/21  vss ,    2 chest tubes continue to drain  1/22 Left chest tube output: apex 270ml/24h and base 150ml/24h. CXR B/L small pleural effusions with bibasilar pna. 75 y/o M presenting with septic shock and high grade SBO s/p exploratory laparotomy, lysis of adhesions, decompression of bowel via enterotomy w/ primary repair, and Abthera VAC placement on 12/6; s/p take down of Abthera, washout and re-application of the Abthera vac on 12/8. Now s/p SBR and end ileostomy on 12/10 acute respiratory distress now improving, Pneumothorax, hyperglycemia, delirium. Now still with persistent pneumothorax when chest pigtail clamped.   12/23 VSS on room air recommending IR drainage of left chest  12/24 No evidence of air leak to left chest tube. Tube clamped. Repeat chest cxr in 4 hours. Anticipate chest tube removal if lung remains expanded. Drainage of loculated left effusion discussed with IR. IR to reconsult for drainage once initial tube removed. Discussed plan with Dr Mayes and IR Fellow  12/24  On NC ,    VSSchest xray sm lt pl eff, left chest tube dc'd.  12/26    2l NC   co  sob,  lt side diminshed  12/27 VSS, CXR with unchanged loculated left pleural effusion- IR consulted for pigtail placement, states effusion can be drained via thoracentesis, pigtail placement not indicated at this time - Dr. Mayes to speak with IR attending.   12/30 Patient with persistent PTX  and left pleural effusion now for IR drainage with Dr Elkins.   12/31 HD stable, L PTC w/ high output, serosanguinous drainage, 990 ml overnight/ 2200 in 24 hrs. Tolerating 2L NC supplemental O2, Cyto pending, continue monitor drainage output.   1/1: LPTC still with High output-400/24h. Continue with pigtail cath drain.  1/ 2     lt pigtail draining  on lws  1/3 HD stable, left pigtail catheter continues to drain. Incentive spirometer encouraged, OOB and mobility, continue pulmonary toileting. Maintain chest tube to suction and monitor output.   1/4: Chest tube milked to prevent clogging of tube, Continues to drain with high out put.  1/5 remains with high out put 170/700. Continue drainage  1/6  persistent drainage from lt pl tube  1/7 Left pigtail 150/450. Daily CXR. Planning for pleurex cath placement on Friday 1/10 w/ Dr. Mayes  1/8        Lt pleural tube   persistent draining    1/9 preop for pleurX placement tomorrow. CT clamped. NPO after MN, type and cross x2.   1/10 s/p VATS L pleurx cath placement   1/11 HD stable, no resp distress, maintain L pleurex to suction overnight, CXR in AM.   1/12 pleurex waterseal  1/13   RRT  called for respiratory distress   plx to pleurvac   back to lws   min drainage  no a/l  1/14 VSS, Left pleurx catheter minimal drainage 10ml/24h, CXR: small left pleural effusion with left pleurx catheter in place.     1/15 Overnight episode of bradycardia and respiratory distress that stabilized with BiPap support, concerned for anemia requiring 2 U PRBC and increase in left pleural effusion, minimal drainage in pleurx.  1/15 s/p bronch, Left VATS. Evacuation of 2L blood from L chest, pleurX removal, chest ubes x2  1/16 HD stable, minimal vasopressors, CXR stable, currently SBT, CT x2 remains to suction, draining.   1/17 HD stable, CXR stable, will water seal chest tubes today, repeat CXR in AM. Cont to monitor drainage.   1/18: L apical chest tube with small AL. CXr appear stable tiny left apical PTX seen on cxr. Continue chest tube to water seal for now as d/w Dr. Mayes today.   1/19 Remains with high out put from left apical. Will continue both chest tubes for today as d/w Dr. Mayes/Luis  1/20 Still with high output drainage remains serosanguenos. Continue drains for today  1/21  vss ,    2 chest tubes continue to drain  1/22 Left chest tube output: apex 270ml/24h and base 150ml/24h. CXR B/L small pleural effusions with bibasilar pna. Plan to d/c left base chest tube today as per Dr. Murray.

## 2020-01-22 NOTE — PROGRESS NOTE ADULT - ATTENDING COMMENTS
Patient seen and examined  One chest tube removed today  Ileostomy continues to have high output  Continue anti-motility medications  Diet as tolerated  TPN

## 2020-01-22 NOTE — PROGRESS NOTE ADULT - SUBJECTIVE AND OBJECTIVE BOX
Patient is a 75y old  Male who presents with a chief complaint of abd. pain (2020 13:14)    f/u leukocytosis and +fungitell    Interval History/ROS:  less cough.  no fever.  sputum with zosyn-R PA.  CT x2 with small amount bleeding and both are to gravity. no n/v.  Rest of ROS otherwise negative.    PAST MEDICAL & SURGICAL HISTORY:  COPD with hypoxia  ETOH abuse  History of lumbosacral spine surgery  History of prostate surgery  History of appendectomy    Allergies  IV Contrast (Hives)    ANTIMICROBIALS:  acyclovir   Oral Tab/Cap (-)  cefTRIAXone   IVPB (12/6 x1)  piperacillin/tazobactam IVPB (-12/15; -)  ceFAZolin   IVPB (-)  vancomycin  IVPB (1/13 x1; -    active  meropenem  IVPB 1000 every 8 hours (-)    MEDICATIONS  (STANDING):  acetylcysteine 20%  Inhalation 4 three times a day  albuterol/ipratropium for Nebulization. 3 every 6 hours  buDESOnide    Inhalation Suspension 0.5 every 12 hours  diphenoxylate/atropine 2 <User Schedule>  enoxaparin Injectable 40 daily  insulin lispro (HumaLOG) corrective regimen sliding scale  every 6 hours  loperamide 16 <User Schedule>  octreotide  Injectable 100 three times a day  opium Tincture 6 <User Schedule>  pantoprazole    Tablet 40 before breakfast  psyllium Powder 1 three times a day  tamsulosin 0.4 daily    Vital Signs Last 24 Hrs  T(F): 98.8 (20 @ 07:00), Max: 98.8 (20 @ 07:00)  HR: 83 (20 @ 10:00)  BP: 144/67 (20 @ 10:00)  RR: 22 (20 @ 10:00)  SpO2: 96% (20 @ 10:00) (89% - 100%)    PHYSICAL EXAM:  Constitutional: cachectic, sitting up in bed  HEAD/EYES:  anicteric, no conjunctival injection  ENT:  supple, extubated; dry mouth  Cardiovascular:   normal S1, S2, no murmur, (+) edema b/l legs  Respiratory:  coarse b/l breath sounds; left CT x2 to gravity  GI:  less distended, ileostomy with liquid stool, non-tender  : rosas  Musculoskeletal:  no synovitis  Neurologic: awake and alert, no focal findings  Skin:  no rash, no erythema, no phlebitis  Psychiatric:  awake, alert, appropriate mood                                  7.9    7.68  )-----------( 308      ( 2020 00:19 )             26.1 01-    138  |  97  |  7   ----------------------------<  139  4.4   |  34  |  0.47  Ca    7.5      2020 00:19Phos  3.0     -Mg     2.0       TPro  5.4  /  Alb  2.3  /  TBili  0.4  /  DBili  0.2  /  AST  12  /  ALT  14  /  AlkPhos  66      Urinalysis Basic - ( 2020 12:41 )  Color: brown / Appearance: Turbid / S.016 / pH: x  Gluc: x / Ketone: Negative  / Bili: Negative / Urobili: Negative   Blood: x / Protein: 30 mg/dL / Nitrite: Negative   Leuk Esterase: Negative / RBC: >50 /hpf / WBC 6 /HPF   Sq Epi: x / Non Sq Epi: 0 /hpf / Bacteria: Few    Cytopathology - Non Gyn Report (19 @ 18:26)    Specimen(s) Submitted  PLEURAL FLUID  Final Diagnosis  PLEURAL FLUID  NEGATIVE FOR MALIGNANT CELLS.    Surgical Pathology Report (12.10.19 @ 21:40)    Surgical Final Report  Final Diagnosis  Small bowel with ileocolic resection:  - Small bowel with diffuse ischemic enteritis, transmural active inflammation, abscess formation and focal necrosis  - Ischemic enteritis extending to proximal small bowel resection margin  - Distal colonic resection margin, viable    MICROBIOLOGY:  Culture - Bronchial (20 @ 06:43)    Gram Stain:   Moderate polymorphonuclear leukocytes per low power field  Few Squamous epithelial cells per low power field  Rare Gram positive cocci in pairs  Rare Gram Negative Rods    -  Gentamicin: I 8    -  Levofloxacin: S <=2    -  Meropenem: S 2    -  Piperacillin/Tazobactam: R >64    -  Tobramycin: S <=2    -  Amikacin: I 32    -  Cefepime: R >16    -  Ceftazidime: R >16    -  Aztreonam: R >16    -  Ciprofloxacin: S <=1    -  Imipenem: I 4    Specimen Source: Bronch Wash Combicath    Culture Results:   Rare Pseudomonas aeruginosa    Culture - Fungal, Blood (01.15.20 @ 10:09)    Specimen Source: .Blood Blood-Venous    Culture Results:   Testing in progress    Culture - Fungal, Blood (01.15.20 @ 10:09)    Specimen Source: .Blood Blood-Peripheral    Culture Results:   Testing in progress    HIV-1/2 Combo Result: Nonreact: (01.15.20 @ 09:03)    Aspergillus Galactomannan Antigen (01.15.20 @ 10:06)    Aspergillus Galactomannan Antigen: <0.500:     Fungitell (20 @ 11:42)    Fungitell: 241:     Culture - Fungal, Blood (collected 2020 18:15)  Source: .Blood Blood  Preliminary Report (2020 08:34):    Testing in progress    Culture - Blood (collected 2020 06:13)  Source: .Blood Blood-Peripheral  Preliminary Report (2020 07:02):    No growth to date.    Culture - Acid Fast (19 @ 03:58)    AFB Specimen Processing: Concentration    Acid-Fast Smear: Negative: Performed At: [address] [id number]  Paresh Roche MD Ph:[phone]    Culture - Blood (19 @ 22:09)    Specimen Source: .Blood Blood-Peripheral    Culture Results:   No growth at 5 days.    Culture - Fungal, Body Fluid (19 @ 19:16)    Specimen Source: .Body Fluid Pleural Fluid    Culture Results:   No growth    Culture - Body Fluid with Gram Stain (19 @ 19:16)    Gram Stain:   No polymorphonuclear cells seen  No organisms seen  by cytocentrifuge    Specimen Source: .Body Fluid Pleural Fluid    Culture Results:   No growth at 5 days    Culture - Blood (19 @ 22:20)    Specimen Source: .Blood Blood    Culture Results:   No growth at 5 days.    Culture - Blood (19 @ 22:20)    Specimen Source: .Blood Blood    Culture Results:   No growth at 5 days.    RADIOLOGY:  Xray Chest 1 View- PORTABLE-Urgent (20 @ 01:33) >  Impression:  The heart is normal in size. Right pleural effusion. The left costophrenic angle is not included in this study. A left chest tube is in place. No pneumothorax. A PICC line is seen on the right and the tip is in the superior vena cava. Bibasilar pneumonia and/or atelectasis cannot be ruled out entirely. -  improved c/w   above radiology personally reviewed and agree with finding    Xray Chest 1 View- PORTABLE-Routine (20 @ 07:07) >  IMPRESSION:  Interval removal of the endotracheal tube. Bibasilar opacities.  imaging above personally reviewed and agree with findings    CT Abdomen and Pelvis w/ IV Cont (01.15.20 @ 13:25) >  There is complete atelectasis of the left lower lobe and partial atelectasis of the left upper lobe. Partial compressive atelectasis of the right lower lobe. Emphysema. Nodular opacities in the right lower lobe, unchanged.  PLEURA: There is a left chest tubes. There is a large loculated left pleural effusion with areas of high attenuation, increased in size. Trace left pneumothorax. There is a small to moderate right pleural effusion. IMPRESSION:  Loculated large left pleural effusion with areas of hyperattenuation likely secondary to hematoma. Interval increase in size of the loculated left pleural effusion since 2019. Left sided chest tube terminating in the pleural space. Trace left pneumothorax.  Extensive left subcutaneous emphysema. Hematoma is also noted along the left lateral chest wall.  Interval resolution of the midline anterior abdominal wall fluid collection.  Markedly distended urinary bladder with mild bilateral hydronephrosis.  A small droplet of gas in the right anterior abdomen adjacent     Xray Chest 1 View- PORTABLE-Urgent (20 @ 04:11) >  Impression:  The heart is normal in size. Left pleural effusion. Left lower lobe pneumonia and/or atelectasis. Small right pleural effusion cannot be ruled out. A PICC line is seen on the right and the tip is in superior vena cava. No pneumothorax. Calcified aortic knob.    Xray Chest 1 View- PORTABLE-Routine (20 @ 09:47) >  IMPRESSION:  Interval placement of right-sided chest tube.  Bilateral lower lung patchy opacities and small bilateral pleural effusions likely representing pulmonary edema.     Xray Chest 1 View- PORTABLE-Routine (20 @ 07:00) >  IMPRESSION: Right PICC line is unchanged. Left pleural catheters are partially imaged. Small pleural effusions likely with associated areas of atelectasis. No clear evidence of a pneumothorax given left superimposed subcutaneous emphysema.    CT Abdomen and Pelvis w/ IV Cont (19 @ 06:05) >  IMPRESSION:   Small midline anterior abdominal wall fluid collection may represent postoperative seroma, hematoma, or less likely abscess.  Interval resolution of the left pneumothorax.  Bilateral pleural effusions, left greater than right, slightly increased in size compared to the prior study.  Unchanged right lower lobe tree-in-bud/nodular opacities with secretions in the bilateral lower lobe bronchi. Findings may represent pneumonia versus distal mucoid impacted airways.    CT Chest No Cont (19 @ 19:39) >  IMPRESSION:  Left-sided chest tube in place with trace left apical pneumothorax.  Patchy airspace opacities in the right lower lobe, differential favors distal mucoid impacted airways versus pneumonia.  Moderate left and small right pleural effusions with mild subsegmental atelectasis of the right lower lobe.    CT Abdomen and Pelvis No Cont (19 @ 16:48) >  IMPRESSION:   High-grade small bowel obstruction with transition point in the right lower quadrant.

## 2020-01-22 NOTE — PROGRESS NOTE ADULT - SUBJECTIVE AND OBJECTIVE BOX
HISTORY  75y Male    24 HOUR EVENTS:    SUBJECTIVE/ROS:  [ ] A ten-point review of systems was otherwise negative except as noted.  [ ] Due to altered mental status/intubation, subjective information were not able to be obtained from the patient. History was obtained, to the extent possible, from review of the chart and collateral sources of information.      NEURO  RASS:     GCS:     [facility] ICU:  Exam: awake, alert, oriented  Meds: acetaminophen   Tablet .. 975 milliGRAM(s) Oral every 6 hours PRN Mild Pain (1 - 3)  ondansetron Injectable 4 milliGRAM(s) IV Push every 6 hours PRN Nausea and/or Vomiting  opium Tincture 6 milliGRAM(s) Oral <User Schedule>    [x] Adequacy of sedation and pain control has been assessed and adjusted      RESPIRATORY  RR: 13 (01-22-20 @ 02:00) (12 - 31)  SpO2: 100% (01-22-20 @ 02:00) (89% - 100%)  Wt(kg): --  Exam: unlabored, clear to auscultation bilaterally  Mechanical Ventilation:     [N/A] Extubation Readiness Assessed  Meds: acetylcysteine 20%  Inhalation 4 milliLiter(s) Inhalation three times a day  albuterol/ipratropium for Nebulization. 3 milliLiter(s) Nebulizer every 6 hours  buDESOnide    Inhalation Suspension 0.5 milliGRAM(s) Inhalation every 12 hours        CARDIOVASCULAR  HR: 73 (01-22-20 @ 02:00) (68 - 99)  BP: 110/54 (01-22-20 @ 02:00) (88/55 - 154/72)  BP(mean): 76 (01-22-20 @ 02:00) (65 - 104)  ABP: --  ABP(mean): --  Wt(kg): --  CVP(cm H2O): --      Exam: regular rate and rhythm  Cardiac Rhythm: sinus  Perfusion     [x]Adequate   [ ]Inadequate  Mentation   [x]Normal       [ ]Reduced  Extremities  [x]Warm         [ ]Cool  Volume Status [ ]Hypervolemic [x]Euvolemic [ ]Hypovolemic  Meds: tamsulosin 0.4 milliGRAM(s) Oral daily        GI/NUTRITION  Exam: soft, nontender, nondistended, incision C/D/I  Diet:  Meds: diphenoxylate/atropine 2 Tablet(s) Oral <User Schedule>  loperamide 16 milliGRAM(s) Oral <User Schedule>  pantoprazole    Tablet 40 milliGRAM(s) Oral before breakfast  psyllium Powder 1 Packet(s) Oral three times a day      GENITOURINARY  I&O's Detail    01-20 @ 07:01 - 01-21 @ 07:00  --------------------------------------------------------  IN:    Oral Fluid: 1440 mL    sodium chloride 0.9%.: 640 mL    Solution: 50 mL    Solution: 150 mL    Solution: 750 mL  Total IN: 3030 mL    OUT:    Chest Tube: 430 mL    Chest Tube: 180 mL    Ileostomy: 2350 mL    Indwelling Catheter - Urethral: 2260 mL  Total OUT: 5220 mL    Total NET: -2190 mL      01-21 @ 07:01 - 01-22 @ 02:40  --------------------------------------------------------  IN:    fat emulsion (Fish Oil and Plant Based) 20% Infusion: 112.5 mL    Oral Fluid: 780 mL    Solution: 250 mL    Solution: 50 mL    Solution: 150 mL    TPN (Total Parenteral Nutrition): 450 mL  Total IN: 1792.5 mL    OUT:    Chest Tube: 70 mL    Chest Tube: 170 mL    Ileostomy: 600 mL    Indwelling Catheter - Urethral: 2290 mL  Total OUT: 3130 mL    Total NET: -1337.5 mL          01-22    138  |  97  |  7   ----------------------------<  139<H>  4.4   |  34<H>  |  0.47<L>    Ca    7.5<L>      22 Jan 2020 00:19  Phos  3.0     01-22  Mg     2.0     01-22    TPro  5.4<L>  /  Alb  2.3<L>  /  TBili  0.4  /  DBili  0.2  /  AST  12  /  ALT  14  /  AlkPhos  66  01-22    [ ] Martinez catheter, indication: N/A  Meds: ergocalciferol 94935 Unit(s) Oral every week  fat emulsion (Fish Oil and Plant Based) 20% Infusion 12.5 mL/Hr IV Continuous <Continuous>  Parenteral Nutrition - Adult 1 Each TPN Continuous <Continuous>  sodium chloride 0.9%. 1000 milliLiter(s) IV Continuous <Continuous>        HEMATOLOGIC  Meds: enoxaparin Injectable 40 milliGRAM(s) SubCutaneous daily    [x] VTE Prophylaxis                        7.9    7.68  )-----------( 308      ( 22 Jan 2020 00:19 )             26.1       Transfusion     [ ] PRBC   [ ] Platelets   [ ] FFP   [ ] Cryoprecipitate      INFECTIOUS DISEASES  WBC Count: 7.68 K/uL (01-22 @ 00:19)    RECENT CULTURES:    Meds: meropenem  IVPB 1000 milliGRAM(s) IV Intermittent every 8 hours        ENDOCRINE  CAPILLARY BLOOD GLUCOSE      POCT Blood Glucose.: 129 mg/dL (21 Jan 2020 23:56)  POCT Blood Glucose.: 195 mg/dL (21 Jan 2020 20:45)    Meds: insulin lispro (HumaLOG) corrective regimen sliding scale   SubCutaneous every 6 hours  octreotide  Injectable 100 MICROGram(s) SubCutaneous three times a day        ACCESS DEVICES:  [ ] Peripheral IV  [ ] Central Venous Line	[ ] R	[ ] L	[ ] IJ	[ ] Fem	[ ] SC	Placed:   [ ] Arterial Line		[ ] R	[ ] L	[ ] Fem	[ ] Rad	[ ] Ax	Placed:   [ ] PICC:					[ ] Mediport  [ ] Urinary Catheter, Date Placed:   [x] Necessity of urinary, arterial, and venous catheters discussed    OTHER MEDICATIONS:  chlorhexidine 2% Cloths 1 Application(s) Topical <User Schedule>      CODE STATUS:      IMAGING: HISTORY  75y Male y/o male with a past medical history of COPD and EtOH dependence who presented on 12/6/2019 with abdominal pain, nausea, hematemesis, and poor PO intake for ~2-3 days. In the ED, he was found to be hypotensive & tachycardic. Labs revealed an CHI and lactic acidosis. Imaging revealed a high grade SBO in the RLQ on CT scan. Hospital course is as follows:  12/06 - s/p exploratory laparotomy, lysis of adhesions, decompression of bowel via enterotomy w/ primary repair, and Abthera VAC placement as the distal 50% of the bowel appeared dusky but was still viable, admitted to SICU post-operatively as he was on vasopressor support and was left intubated  12/08 - s/p re-exploration, proximal 165 cm of small bowel appeared pink & viable but beyond that had patchy areas of ischemia so decision was made to give the bowel more time to demarcate before resecting in order to preserve as much small bowel as possible so Abthera VAC was replaced  12/09 - s/p re-exploration, small bowel resection of 150 cm (150 cm remaining), ileocecetomy, end ileostomy, mucous fistula, and abdominal closure  12/11 - extubated to BiPAP, noted to be in SVT that was rate controlled w/ metoprolol  12/14 - started on TPN  12/15 - noted to have spontaneous left pneumothorax s/p left pigtail catheter placement, noted to be in SVT again so amiodarone started, high ileostomy output noted so concern for short bowel syndrome  12/16 - amiodarone discontinued, started on beta blocker with metoprolol  12/18 - started Lomotil and ileostomy repletions with IV fluids  12/19 - started Imodium, left pigtail catheter was placed to water seal but pneumothorax noted on follow-up CXR so placed back to suction  12/20 - started tincture of opium, concern for aspiration so changed diet from regular to dysphagia 1 pureed with nectar-thickened liquids  12/22 - left pigtail catheter placed to water seal with no pneumothorax noted on follow-up CXR, CT chest with moderate left pleural effusion not being drained by pigtail catheter  12/23 - 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  12/24 - left pigtail catheter discontinued  12/28 - started Ancef for erythematous midline wound  12/29 - beta blocker discontinued for intermittent episodes of hypotension  12/30 - left pigtail catheter placement by IR with drainage of serous transudative fluid  01/02 - FEES demonstrating penetration with thin liquids so patient kept on dysphagia 1 pureed with nectar-thickened liquids  01/07 - Transferred to floors  01/10 - s/p left VATS w/ PleurX catheter placement  01/11 - evaluated by speech & swallow, advanced diet to mechanical soft with thin liquids  01/12 - PleurX catheter placed to water seal with on pneumothorax on follow-up CXR  01/13 - RRT for hypoxemia, placed on BiPAP, PleurX catheter placed back to suction, started vancomycin & Zosyn for possible HCAP  01/14 - noted to have minimal output from PleurX catheter, lung ultrasound with large left pleural effusion concerning for hemothorax, multiple episodes of bradycardia secondary to hypoxemia, remains on BiPAP  01/15 - worsening respiratory distress requiring intubation, hypotensive requiring vasopressor support, CT chest with large left hemothorax w/ trace pneumothorax & extensive subcutaneous emphysema so taken to OR emergently for left VATS, evacuation fo 2 L of clot, and placement of two left chest tubes  01/16 - extubated, weaned off vasopressor support  01/17 - two left chest tubes placed to water seal with no pneumothorax noted on follow-up CXR        24 HOUR EVENTS:  - Chest tubes remain on water seal.   - Ileostomy output remains high     SUBJECTIVE/ROS:  [ ] A ten-point review of systems was otherwise negative except as noted.  [ ] Due to altered mental status/intubation, subjective information were not able to be obtained from the patient. History was obtained, to the extent possible, from review of the chart and collateral sources of information.      NEURO  Exam: awake, alert, oriented  Meds: acetaminophen   Tablet .. 975 milliGRAM(s) Oral every 6 hours PRN Mild Pain (1 - 3)  ondansetron Injectable 4 milliGRAM(s) IV Push every 6 hours PRN Nausea and/or Vomiting  opium Tincture 6 milliGRAM(s) Oral <User Schedule>    [x] Adequacy of sedation and pain control has been assessed and adjusted      RESPIRATORY  RR: 13 (01-22-20 @ 02:00) (12 - 31)  SpO2: 100% (01-22-20 @ 02:00) (89% - 100%)  Exam: unlabored, clear to auscultation bilaterally  Mechanical Ventilation: none   [N/A] Extubation Readiness Assessed  Meds: acetylcysteine 20%  Inhalation 4 milliLiter(s) Inhalation three times a day  albuterol/ipratropium for Nebulization. 3 milliLiter(s) Nebulizer every 6 hours  buDESOnide    Inhalation Suspension 0.5 milliGRAM(s) Inhalation every 12 hours        CARDIOVASCULAR  HR: 73 (01-22-20 @ 02:00) (68 - 99)  BP: 110/54 (01-22-20 @ 02:00) (88/55 - 154/72)  BP(mean): 76 (01-22-20 @ 02:00) (65 - 104)  Exam: regular rate and rhythm  Cardiac Rhythm: sinus  Perfusion     [x]Adequate   [ ]Inadequate  Mentation   [x]Normal       [ ]Reduced  Extremities  [x]Warm         [ ]Cool  Volume Status [ ]Hypervolemic [x]Euvolemic [ ]Hypovolemic  Meds: tamsulosin 0.4 milliGRAM(s) Oral daily        GI/NUTRITION  Exam: soft, nontender, nondistended,  Diet: mechanical diet   Meds: diphenoxylate/atropine 2 Tablet(s) Oral <User Schedule>  loperamide 16 milliGRAM(s) Oral <User Schedule>  pantoprazole    Tablet 40 milliGRAM(s) Oral before breakfast  psyllium Powder 1 Packet(s) Oral three times a day      GENITOURINARY  I&O's Detail    01-20 @ 07:01  -  01-21 @ 07:00  --------------------------------------------------------  IN:    Oral Fluid: 1440 mL    sodium chloride 0.9%.: 640 mL    Solution: 50 mL    Solution: 150 mL    Solution: 750 mL  Total IN: 3030 mL    OUT:    Chest Tube: 430 mL    Chest Tube: 180 mL    Ileostomy: 2350 mL    Indwelling Catheter - Urethral: 2260 mL  Total OUT: 5220 mL    Total NET: -2190 mL      01-21 @ 07:01  -  01-22 @ 02:40  --------------------------------------------------------  IN:    fat emulsion (Fish Oil and Plant Based) 20% Infusion: 112.5 mL    Oral Fluid: 780 mL    Solution: 250 mL    Solution: 50 mL    Solution: 150 mL    TPN (Total Parenteral Nutrition): 450 mL  Total IN: 1792.5 mL    OUT:    Chest Tube: 70 mL    Chest Tube: 170 mL    Ileostomy: 600 mL    Indwelling Catheter - Urethral: 2290 mL  Total OUT: 3130 mL    Total NET: -1337.5 mL          01-22    138  |  97  |  7   ----------------------------<  139<H>  4.4   |  34<H>  |  0.47<L>    Ca    7.5<L>      22 Jan 2020 00:19  Phos  3.0     01-22  Mg     2.0     01-22    TPro  5.4<L>  /  Alb  2.3<L>  /  TBili  0.4  /  DBili  0.2  /  AST  12  /  ALT  14  /  AlkPhos  66  01-22    [x] Martinez catheter, indication: urinary retention   Meds: ergocalciferol 79967 Unit(s) Oral every week  fat emulsion (Fish Oil and Plant Based) 20% Infusion 12.5 mL/Hr IV Continuous <Continuous>  Parenteral Nutrition - Adult 1 Each TPN Continuous <Continuous>  sodium chloride 0.9%. 1000 milliLiter(s) IV Continuous <Continuous>        HEMATOLOGIC  Meds: enoxaparin Injectable 40 milliGRAM(s) SubCutaneous daily    [x] VTE Prophylaxis                        7.9    7.68  )-----------( 308      ( 22 Jan 2020 00:19 )             26.1       Transfusion     [ ] PRBC   [ ] Platelets   [ ] FFP   [ ] Cryoprecipitate      INFECTIOUS DISEASES  WBC Count: 7.68 K/uL (01-22 @ 00:19)    RECENT CULTURES:    Meds: meropenem  IVPB 1000 milliGRAM(s) IV Intermittent every 8 hours        ENDOCRINE  CAPILLARY BLOOD GLUCOSE      POCT Blood Glucose.: 129 mg/dL (21 Jan 2020 23:56)  POCT Blood Glucose.: 195 mg/dL (21 Jan 2020 20:45)    Meds: insulin lispro (HumaLOG) corrective regimen sliding scale   SubCutaneous every 6 hours  octreotide  Injectable 100 MICROGram(s) SubCutaneous three times a day        ACCESS DEVICES:  [x] Peripheral IV  [ ] Central Venous Line	[ ] R	[ ] L	[ ] IJ	[ ] Fem	[ ] SC	Placed:   [ ] Arterial Line		[ ] R	[ ] L	[ ] Fem	[ ] Rad	[ ] Ax	Placed:   [ ] PICC:					[ ] Mediport  [ ] Urinary Catheter, Date Placed:   [x] Necessity of urinary, arterial, and venous catheters discussed    OTHER MEDICATIONS:  chlorhexidine 2% Cloths 1 Application(s) Topical <User Schedule>      CODE STATUS: full code       IMAGING: < from: CT Abdomen and Pelvis w/ IV Cont (01.15.20 @ 13:25) >  FINDINGS:    CHEST:     LUNGS AND LARGE AIRWAYS: There is complete atelectasis of the left lower lobe and partial atelectasis of the left upper lobe. Partial compressive atelectasis of the right lower lobe. Emphysema. Nodular opacities in the right lower lobe, unchanged.  PLEURA: There is a left chest tubes. There is a large loculated left pleural effusion with areas of high attenuation, increased in size. Trace left pneumothorax. There is a small to moderate right pleural effusion.  VESSELS: Atherosclerotic changes of the aorta and coronary arteries. Right subclavian approach PICC tip in the SVC.  HEART: Heart size is normal. No pericardial effusion.  MEDIASTINUM AND MIKI: No lymphadenopathy.  CHEST WALL AND LOWER NECK: Extensive subcutaneous emphysema along the left chest wall. In addition, high attenuation is noted in the left chest wall adjacent to the pleural catheter measuring approximately 3.4 x 2.1 cm (3:146), suspicious for hematoma.    ABDOMEN AND PELVIS:    LIVER: Subcentimeter hypodense focus in the right hepatic lobe, too small to characterize, but unchanged.  BILE DUCTS: Normal caliber.  GALLBLADDER: Within normal limits.  SPLEEN: Within normal limits.  PANCREAS: Within normal limits.  ADRENALS: Within normal limits.  KIDNEYS/URETERS:Mild hydronephrosis bilaterally. Bilateral nonobstructing calculi.    BLADDER: Markedly distended urinary bladder.  REPRODUCTIVE ORGANS: Prostatectomy.    BOWEL: Right lower quadrant ileostomy. No bowel obstruction. Colonic diverticulosis.  PERITONEUM: Trace ascites. Multiple nonspecific peritoneal calcifications, unchanged. A small droplet of gas in the right anterior abdomen adjacent to the ileostomy (3:208 and 5:43) may be extraluminal.  VESSELS: Atherosclerotic changes.  RETROPERITONEUM/LYMPHNODES: No lymphadenopathy.    ABDOMINAL WALL: Diffuse anasarca. Postsurgical changes of the anterior abdominal wall. The previous reported fluid midline abdominal wall collection has resolved.   BONES: Degenerative changes in the spine.    IMPRESSION:     Loculated large left pleural effusion with areas of hyperattenuation likely secondary to hematoma. Interval increase in size of the loculated left pleural effusion since 12/30/2019. Left sided chest tube terminating in the pleural space. Trace left pneumothorax.     Extensive left subcutaneous emphysema. Hematoma is also noted along the left lateral chest wall.    Interval resolution of the midline anterior abdominal wall fluid collection.    Markedly distended urinary bladder with mild bilateral hydronephrosis.    A small droplet of gas in the right anterior abdomen adjacent to the ileostomy may be extraluminal. Repeat CT with oral contrast may be helpful for further evaluation.      < end of copied text > HISTORY  75y Male y/o male with a past medical history of COPD and EtOH dependence who presented on 12/6/2019 with abdominal pain, nausea, hematemesis, and poor PO intake for ~2-3 days. In the ED, he was found to be hypotensive & tachycardic. Labs revealed an CHI and lactic acidosis. Imaging revealed a high grade SBO in the RLQ on CT scan. Hospital course is as follows:  12/06 - s/p exploratory laparotomy, lysis of adhesions, decompression of bowel via enterotomy w/ primary repair, and Abthera VAC placement as the distal 50% of the bowel appeared dusky but was still viable, admitted to SICU post-operatively as he was on vasopressor support and was left intubated  12/08 - s/p re-exploration, proximal 165 cm of small bowel appeared pink & viable but beyond that had patchy areas of ischemia so decision was made to give the bowel more time to demarcate before resecting in order to preserve as much small bowel as possible so Abthera VAC was replaced  12/09 - s/p re-exploration, small bowel resection of 150 cm (150 cm remaining), ileocecetomy, end ileostomy, mucous fistula, and abdominal closure  12/11 - extubated to BiPAP, noted to be in SVT that was rate controlled w/ metoprolol  12/14 - started on TPN  12/15 - noted to have spontaneous left pneumothorax s/p left pigtail catheter placement, noted to be in SVT again so amiodarone started, high ileostomy output noted so concern for short bowel syndrome  12/16 - amiodarone discontinued, started on beta blocker with metoprolol  12/18 - started Lomotil and ileostomy repletions with IV fluids  12/19 - started Imodium, left pigtail catheter was placed to water seal but pneumothorax noted on follow-up CXR so placed back to suction  12/20 - started tincture of opium, concern for aspiration so changed diet from regular to dysphagia 1 pureed with nectar-thickened liquids  12/22 - left pigtail catheter placed to water seal with no pneumothorax noted on follow-up CXR, CT chest with moderate left pleural effusion not being drained by pigtail catheter  12/23 - 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  12/24 - left pigtail catheter discontinued  12/28 - started Ancef for erythematous midline wound  12/29 - beta blocker discontinued for intermittent episodes of hypotension  12/30 - left pigtail catheter placement by IR with drainage of serous transudative fluid  01/02 - FEES demonstrating penetration with thin liquids so patient kept on dysphagia 1 pureed with nectar-thickened liquids  01/07 - Transferred to floors  01/10 - s/p left VATS w/ PleurX catheter placement  01/11 - evaluated by speech & swallow, advanced diet to mechanical soft with thin liquids  01/12 - PleurX catheter placed to water seal with on pneumothorax on follow-up CXR  01/13 - RRT for hypoxemia, placed on BiPAP, PleurX catheter placed back to suction, started vancomycin & Zosyn for possible HCAP  01/14 - noted to have minimal output from PleurX catheter, lung ultrasound with large left pleural effusion concerning for hemothorax, multiple episodes of bradycardia secondary to hypoxemia, remains on BiPAP  01/15 - worsening respiratory distress requiring intubation, hypotensive requiring vasopressor support, CT chest with large left hemothorax w/ trace pneumothorax & extensive subcutaneous emphysema so taken to OR emergently for left VATS, evacuation fo 2 L of clot, and placement of two left chest tubes  01/16 - extubated, weaned off vasopressor support  01/17 - two left chest tubes placed to water seal with no pneumothorax noted on follow-up CXR        24 HOUR EVENTS:  - Chest tubes remain on water seal.   - Ileostomy output remains high  - PICC exchanged during the day, TPN started in the evening    SUBJECTIVE/ROS:  [ ] A ten-point review of systems was otherwise negative except as noted.  [ ] Due to altered mental status/intubation, subjective information were not able to be obtained from the patient. History was obtained, to the extent possible, from review of the chart and collateral sources of information.      NEURO  Exam: awake, alert, oriented  Meds: acetaminophen   Tablet .. 975 milliGRAM(s) Oral every 6 hours PRN Mild Pain (1 - 3)  ondansetron Injectable 4 milliGRAM(s) IV Push every 6 hours PRN Nausea and/or Vomiting  opium Tincture 6 milliGRAM(s) Oral <User Schedule>    [x] Adequacy of sedation and pain control has been assessed and adjusted      RESPIRATORY  RR: 13 (01-22-20 @ 02:00) (12 - 31)  SpO2: 100% (01-22-20 @ 02:00) (89% - 100%)  Exam: unlabored, clear to auscultation bilaterally  Mechanical Ventilation: none   [N/A] Extubation Readiness Assessed  Meds: acetylcysteine 20%  Inhalation 4 milliLiter(s) Inhalation three times a day  albuterol/ipratropium for Nebulization. 3 milliLiter(s) Nebulizer every 6 hours  buDESOnide    Inhalation Suspension 0.5 milliGRAM(s) Inhalation every 12 hours        CARDIOVASCULAR  HR: 73 (01-22-20 @ 02:00) (68 - 99)  BP: 110/54 (01-22-20 @ 02:00) (88/55 - 154/72)  BP(mean): 76 (01-22-20 @ 02:00) (65 - 104)  Exam: regular rate and rhythm  Cardiac Rhythm: sinus  Perfusion     [x]Adequate   [ ]Inadequate  Mentation   [x]Normal       [ ]Reduced  Extremities  [x]Warm         [ ]Cool  Volume Status [ ]Hypervolemic [x]Euvolemic [ ]Hypovolemic  Meds: tamsulosin 0.4 milliGRAM(s) Oral daily        GI/NUTRITION  Exam: soft, nontender, nondistended,  Diet: mechanical diet   Meds: diphenoxylate/atropine 2 Tablet(s) Oral <User Schedule>  loperamide 16 milliGRAM(s) Oral <User Schedule>  pantoprazole    Tablet 40 milliGRAM(s) Oral before breakfast  psyllium Powder 1 Packet(s) Oral three times a day      GENITOURINARY  I&O's Detail    01-20 @ 07:01  -  01-21 @ 07:00  --------------------------------------------------------  IN:    Oral Fluid: 1440 mL    sodium chloride 0.9%.: 640 mL    Solution: 50 mL    Solution: 150 mL    Solution: 750 mL  Total IN: 3030 mL    OUT:    Chest Tube: 430 mL    Chest Tube: 180 mL    Ileostomy: 2350 mL    Indwelling Catheter - Urethral: 2260 mL  Total OUT: 5220 mL    Total NET: -2190 mL      01-21 @ 07:01  -  01-22 @ 02:40  --------------------------------------------------------  IN:    fat emulsion (Fish Oil and Plant Based) 20% Infusion: 112.5 mL    Oral Fluid: 780 mL    Solution: 250 mL    Solution: 50 mL    Solution: 150 mL    TPN (Total Parenteral Nutrition): 450 mL  Total IN: 1792.5 mL    OUT:    Chest Tube: 70 mL    Chest Tube: 170 mL    Ileostomy: 600 mL    Indwelling Catheter - Urethral: 2290 mL  Total OUT: 3130 mL    Total NET: -1337.5 mL          01-22    138  |  97  |  7   ----------------------------<  139<H>  4.4   |  34<H>  |  0.47<L>    Ca    7.5<L>      22 Jan 2020 00:19  Phos  3.0     01-22  Mg     2.0     01-22    TPro  5.4<L>  /  Alb  2.3<L>  /  TBili  0.4  /  DBili  0.2  /  AST  12  /  ALT  14  /  AlkPhos  66  01-22    [x] Martinez catheter, indication: urinary retention   Meds: ergocalciferol 86667 Unit(s) Oral every week  fat emulsion (Fish Oil and Plant Based) 20% Infusion 12.5 mL/Hr IV Continuous <Continuous>  Parenteral Nutrition - Adult 1 Each TPN Continuous <Continuous>  sodium chloride 0.9%. 1000 milliLiter(s) IV Continuous <Continuous>        HEMATOLOGIC  Meds: enoxaparin Injectable 40 milliGRAM(s) SubCutaneous daily    [x] VTE Prophylaxis                        7.9    7.68  )-----------( 308      ( 22 Jan 2020 00:19 )             26.1       Transfusion     [ ] PRBC   [ ] Platelets   [ ] FFP   [ ] Cryoprecipitate      INFECTIOUS DISEASES  WBC Count: 7.68 K/uL (01-22 @ 00:19)    RECENT CULTURES:    Meds: meropenem  IVPB 1000 milliGRAM(s) IV Intermittent every 8 hours        ENDOCRINE  CAPILLARY BLOOD GLUCOSE      POCT Blood Glucose.: 129 mg/dL (21 Jan 2020 23:56)  POCT Blood Glucose.: 195 mg/dL (21 Jan 2020 20:45)    Meds: insulin lispro (HumaLOG) corrective regimen sliding scale   SubCutaneous every 6 hours  octreotide  Injectable 100 MICROGram(s) SubCutaneous three times a day        ACCESS DEVICES:  [x] Peripheral IV  [ ] Central Venous Line	[ ] R	[ ] L	[ ] IJ	[ ] Fem	[ ] SC	Placed:   [ ] Arterial Line		[ ] R	[ ] L	[ ] Fem	[ ] Rad	[ ] Ax	Placed:   [ ] PICC:					[ ] Mediport  [ ] Urinary Catheter, Date Placed:   [x] Necessity of urinary, arterial, and venous catheters discussed    OTHER MEDICATIONS:  chlorhexidine 2% Cloths 1 Application(s) Topical <User Schedule>      CODE STATUS: full code       IMAGING: < from: CT Abdomen and Pelvis w/ IV Cont (01.15.20 @ 13:25) >  FINDINGS:    CHEST:     LUNGS AND LARGE AIRWAYS: There is complete atelectasis of the left lower lobe and partial atelectasis of the left upper lobe. Partial compressive atelectasis of the right lower lobe. Emphysema. Nodular opacities in the right lower lobe, unchanged.  PLEURA: There is a left chest tubes. There is a large loculated left pleural effusion with areas of high attenuation, increased in size. Trace left pneumothorax. There is a small to moderate right pleural effusion.  VESSELS: Atherosclerotic changes of the aorta and coronary arteries. Right subclavian approach PICC tip in the SVC.  HEART: Heart size is normal. No pericardial effusion.  MEDIASTINUM AND MIKI: No lymphadenopathy.  CHEST WALL AND LOWER NECK: Extensive subcutaneous emphysema along the left chest wall. In addition, high attenuation is noted in the left chest wall adjacent to the pleural catheter measuring approximately 3.4 x 2.1 cm (3:146), suspicious for hematoma.    ABDOMEN AND PELVIS:    LIVER: Subcentimeter hypodense focus in the right hepatic lobe, too small to characterize, but unchanged.  BILE DUCTS: Normal caliber.  GALLBLADDER: Within normal limits.  SPLEEN: Within normal limits.  PANCREAS: Within normal limits.  ADRENALS: Within normal limits.  KIDNEYS/URETERS:Mild hydronephrosis bilaterally. Bilateral nonobstructing calculi.    BLADDER: Markedly distended urinary bladder.  REPRODUCTIVE ORGANS: Prostatectomy.    BOWEL: Right lower quadrant ileostomy. No bowel obstruction. Colonic diverticulosis.  PERITONEUM: Trace ascites. Multiple nonspecific peritoneal calcifications, unchanged. A small droplet of gas in the right anterior abdomen adjacent to the ileostomy (3:208 and 5:43) may be extraluminal.  VESSELS: Atherosclerotic changes.  RETROPERITONEUM/LYMPHNODES: No lymphadenopathy.    ABDOMINAL WALL: Diffuse anasarca. Postsurgical changes of the anterior abdominal wall. The previous reported fluid midline abdominal wall collection has resolved.   BONES: Degenerative changes in the spine.    IMPRESSION:     Loculated large left pleural effusion with areas of hyperattenuation likely secondary to hematoma. Interval increase in size of the loculated left pleural effusion since 12/30/2019. Left sided chest tube terminating in the pleural space. Trace left pneumothorax.     Extensive left subcutaneous emphysema. Hematoma is also noted along the left lateral chest wall.    Interval resolution of the midline anterior abdominal wall fluid collection.    Markedly distended urinary bladder with mild bilateral hydronephrosis.    A small droplet of gas in the right anterior abdomen adjacent to the ileostomy may be extraluminal. Repeat CT with oral contrast may be helpful for further evaluation.      < end of copied text >

## 2020-01-22 NOTE — PROGRESS NOTE ADULT - SUBJECTIVE AND OBJECTIVE BOX
Surgery Progress Note    SUBJECTIVE/24 HOUR EVENTS:  - Chest tubes remain on water seal.   - Ileostomy output remains high  - PICC exchanged during the day, TPN started in the evening  - Patient seen and examined at bedside   - Patient states feeling ok  - Denies abdominal pain, Chest pain, SOB, N/V  --------------------------------------------------------------------------------------------------  OBJECTIVE:   Physical Exam:  General: AAOx3, NAD, lying comfortably in bed  HEENT: NC/AT  Respiratory: nonlabored breathing  Cardiovascular: RRR, normal S1 and S2, no murmurs or gallops  Abdomen: non-distended, soft, non-tender  Extremities: WWP, no edema  CT: b/l ss fluid out put, no leaks  Ostomy: pink and viable, semiformed stool and air in bag.  --------------------------------------------------------------------------------------------------  V/S:  Vital Signs Last 24 Hrs  T(C): 37 (22 Jan 2020 03:00), Max: 37.3 (21 Jan 2020 11:00)  T(F): 98.6 (22 Jan 2020 03:00), Max: 99.1 (21 Jan 2020 11:00)  HR: 76 (22 Jan 2020 07:00) (67 - 101)  BP: 111/56 (22 Jan 2020 07:00) (88/55 - 160/76)  BP(mean): 78 (22 Jan 2020 07:00) (65 - 109)  RR: 16 (22 Jan 2020 07:00) (12 - 31)  SpO2: 93% (22 Jan 2020 07:00) (89% - 100%)    --------------------------------------------------------------------------------------------------  I/Os:    21 Jan 2020 07:01  -  22 Jan 2020 07:00  --------------------------------------------------------  IN:    fat emulsion (Fish Oil and Plant Based) 20% Infusion: 150 mL    Oral Fluid: 1030 mL    sodium chloride 0.9%.: 300 mL    Solution: 150 mL    Solution: 250 mL    Solution: 50 mL    TPN (Total Parenteral Nutrition): 700 mL  Total IN: 2630 mL    OUT:    Chest Tube: 270 mL    Chest Tube: 150 mL    Ileostomy: 1800 mL    Indwelling Catheter - Urethral: 2990 mL  Total OUT: 5210 mL    Total NET: -2580 mL        --------------------------------------------------------------------------------------------------  LABS:                        7.9    7.68  )-----------( 308      ( 22 Jan 2020 00:19 )             26.1     22 Jan 2020 00:19    138    |  97     |  7      ----------------------------<  139    4.4     |  34     |  0.47     Ca    7.5        22 Jan 2020 00:19  Phos  3.0       22 Jan 2020 00:19  Mg     2.0       22 Jan 2020 00:19    TPro  5.4    /  Alb  2.3    /  TBili  0.4    /  DBili  0.2    /  AST  12     /  ALT  14     /  AlkPhos  66     22 Jan 2020 00:19      CAPILLARY BLOOD GLUCOSE      POCT Blood Glucose.: 142 mg/dL (22 Jan 2020 05:17)  POCT Blood Glucose.: 129 mg/dL (21 Jan 2020 23:56)  POCT Blood Glucose.: 195 mg/dL (21 Jan 2020 20:45)        LIVER FUNCTIONS - ( 22 Jan 2020 00:19 )  Alb: 2.3 g/dL / Pro: 5.4 g/dL / ALK PHOS: 66 U/L / ALT: 14 U/L / AST: 12 U/L / GGT: x               --------------------------------------------------------------------------------------------------  MEDICATIONS  (STANDING):  acetylcysteine 20%  Inhalation 4 milliLiter(s) Inhalation three times a day  albuterol/ipratropium for Nebulization. 3 milliLiter(s) Nebulizer every 6 hours  buDESOnide    Inhalation Suspension 0.5 milliGRAM(s) Inhalation every 12 hours  chlorhexidine 2% Cloths 1 Application(s) Topical <User Schedule>  diphenoxylate/atropine 2 Tablet(s) Oral <User Schedule>  enoxaparin Injectable 40 milliGRAM(s) SubCutaneous daily  ergocalciferol 27607 Unit(s) Oral every week  fat emulsion (Fish Oil and Plant Based) 20% Infusion 12.5 mL/Hr (12.5 mL/Hr) IV Continuous <Continuous>  insulin lispro (HumaLOG) corrective regimen sliding scale   SubCutaneous every 6 hours  loperamide 16 milliGRAM(s) Oral <User Schedule>  meropenem  IVPB 1000 milliGRAM(s) IV Intermittent every 8 hours  octreotide  Injectable 100 MICROGram(s) SubCutaneous three times a day  opium Tincture 6 milliGRAM(s) Oral <User Schedule>  pantoprazole    Tablet 40 milliGRAM(s) Oral before breakfast  Parenteral Nutrition - Adult 1 Each (50 mL/Hr) TPN Continuous <Continuous>  psyllium Powder 1 Packet(s) Oral three times a day  sodium chloride 0.9%. 1000 milliLiter(s) (5 mL/Hr) IV Continuous <Continuous>  tamsulosin 0.4 milliGRAM(s) Oral daily    MEDICATIONS  (PRN):  acetaminophen   Tablet .. 975 milliGRAM(s) Oral every 6 hours PRN Mild Pain (1 - 3)  ondansetron Injectable 4 milliGRAM(s) IV Push every 6 hours PRN Nausea and/or Vomiting    --------------------------------------------------------------------------------------------------

## 2020-01-22 NOTE — PROGRESS NOTE ADULT - SUBJECTIVE AND OBJECTIVE BOX
[facility] NUTRITION SUPPORT / TPN -- FOLLOW UP NOTE  --------------------------------------------------------------------------------    24 hour events/subjective:          Diet:  Diet, Mechanical Soft:   No Carb Prosource (1pkg = 15gms Protein)     Qty per Day:  2  MCT Oil (HNT Only)     Qty per Day:  15mL  Supplement Feeding Modality:  Oral  Ensure Enlive Cans or Servings Per Day:  2       Frequency:  Daily  Promote Cans or Servings Per Day:  2       Frequency:  Daily (01-17-20 @ 12:44)      Appetite: [  ]Poor [  ]Adequate [  ]Good  Caloric intake:  [   ]  Adequate   [   ] Inadequate    ROS: General/ GI see HPI  all other systems negative      ALLERGIES & MEDICATIONS  --------------------------------------------------------------------------------  ALLERGIES  IV Contrast (Hives)      STANDING INPATIENT MEDICATIONS    acetylcysteine 20%  Inhalation 4 milliLiter(s) Inhalation three times a day  albuterol/ipratropium for Nebulization. 3 milliLiter(s) Nebulizer every 6 hours  buDESOnide    Inhalation Suspension 0.5 milliGRAM(s) Inhalation every 12 hours  chlorhexidine 2% Cloths 1 Application(s) Topical <User Schedule>  diphenoxylate/atropine 2 Tablet(s) Oral <User Schedule>  enoxaparin Injectable 40 milliGRAM(s) SubCutaneous daily  ergocalciferol 31944 Unit(s) Oral every week  fat emulsion (Fish Oil and Plant Based) 20% Infusion 12.5 mL/Hr IV Continuous <Continuous>  insulin lispro (HumaLOG) corrective regimen sliding scale   SubCutaneous every 6 hours  loperamide 16 milliGRAM(s) Oral <User Schedule>  meropenem  IVPB 1000 milliGRAM(s) IV Intermittent every 8 hours  octreotide  Injectable 100 MICROGram(s) SubCutaneous three times a day  opium Tincture 6 milliGRAM(s) Oral <User Schedule>  pantoprazole    Tablet 40 milliGRAM(s) Oral before breakfast  Parenteral Nutrition - Adult 1 Each TPN Continuous <Continuous>  psyllium Powder 1 Packet(s) Oral three times a day  sodium chloride 0.9%. 1000 milliLiter(s) IV Continuous <Continuous>  tamsulosin 0.4 milliGRAM(s) Oral daily      PRN INPATIENT MEDICATION  acetaminophen   Tablet .. 975 milliGRAM(s) Oral every 6 hours PRN  ondansetron Injectable 4 milliGRAM(s) IV Push every 6 hours PRN        VITALS/PHYSICAL EXAM  --------------------------------------------------------------------------------  T(C): 37 (01-22-20 @ 03:00), Max: 37.3 (01-21-20 @ 11:00)  HR: 71 (01-22-20 @ 08:32) (67 - 101)  BP: 111/56 (01-22-20 @ 07:00) (88/55 - 160/76)  RR: 16 (01-22-20 @ 07:00) (12 - 31)  SpO2: 100% (01-22-20 @ 08:32) (89% - 100%)  Wt(kg): --        01-21-20 @ 07:01  -  01-22-20 @ 07:00  --------------------------------------------------------  IN: 2630 mL / OUT: 5210 mL / NET: -2580 mL            LABS/ CULTURES/ RADIOLOGY:              7.9    7.68  >-----------<  308      [01-22-20 @ 00:19]              26.1     138  |  97  |  7   ----------------------------<  139      [01-22-20 @ 00:19]  4.4   |  34  |  0.47        Ca     7.5     [01-22-20 @ 00:19]      Mg     2.0     [01-22-20 @ 00:19]      Phos  3.0     [01-22-20 @ 00:19]    TPro  5.4  /  Alb  2.3  /  TBili  0.4  /  DBili  0.2  /  AST  12  /  ALT  14  /  AlkPhos  66  [01-22-20 @ 00:19]      CAPILLARY BLOOD GLUCOSE  POCT Blood Glucose.: 142 mg/dL (22 Jan 2020 05:17)  POCT Blood Glucose.: 129 mg/dL (21 Jan 2020 23:56)  POCT Blood Glucose.: 195 mg/dL (21 Jan 2020 20:45)    Prealbumin, Serum: 13 mg/dL (01-21-20 @ 02:00)  Prealbumin, Serum: 15 mg/dL (01-15-20 @ 09:01)  Prealbumin, Serum: 13 mg/dL (01-14-20 @ 07:29)  Prealbumin, Serum: 13 mg/dL (01-07-20 @ 02:35)  Prealbumin, Serum: 14 mg/dL (01-06-20 @ 07:40)    Triglycerides, Serum: 76 mg/dL (01.21.20 @ 00:26) [facility] NUTRITION SUPPORT / TPN -- FOLLOW UP NOTE  --------------------------------------------------------------------------------    24 hour events/subjective:  New PICC placed in IR   TPN restarted for high output ostomy  Eating well- but insufficient calories to make up for losses and malabsorption  Ileostomy output- increased  ostomy output with return to diet             1800 output for 24 hours             replacement with 0.25 cc per cc output with NS            continue lomotil, imodium, octreotide, tincture of opium at max doses  Persistent Lt pleural effusion & COPD-  still with significant chest tube output,             right pleural effusion vs. pna improved slightly, thick secretions started Mucomyst             Coughing this am- better since then                       Currently no cough/ cp/palp/ sob/ dyspnea  Leukocytosis- Meropenem as per ID  No  n/v  Pt denies any abdominal pain or urinary complaints  No f/c/s      Diet:  Diet, Mechanical Soft:   No Carb Prosource (1pkg = 15gms Protein)     Qty per Day:  2  MCT Oil (HNT Only)     Qty per Day:  15mL  Supplement Feeding Modality:  Oral  Ensure Enlive Cans or Servings Per Day:  2       Frequency:  Daily  Promote Cans or Servings Per Day:  2       Frequency:  Daily (01-17-20 @ 12:44)      Appetite: [  ]Poor [  x]Adequate [  ]Good  Caloric intake:  [  x ]  Adequate   [   ] Inadequate    ROS: General/ GI see HPI  all other systems negative      ALLERGIES & MEDICATIONS  --------------------------------------------------------------------------------  ALLERGIES  IV Contrast (Hives)      STANDING INPATIENT MEDICATIONS    acetylcysteine 20%  Inhalation 4 milliLiter(s) Inhalation three times a day  albuterol/ipratropium for Nebulization. 3 milliLiter(s) Nebulizer every 6 hours  buDESOnide    Inhalation Suspension 0.5 milliGRAM(s) Inhalation every 12 hours  chlorhexidine 2% Cloths 1 Application(s) Topical <User Schedule>  diphenoxylate/atropine 2 Tablet(s) Oral <User Schedule>  enoxaparin Injectable 40 milliGRAM(s) SubCutaneous daily  ergocalciferol 60316 Unit(s) Oral every week  fat emulsion (Fish Oil and Plant Based) 20% Infusion 12.5 mL/Hr IV Continuous <Continuous>  insulin lispro (HumaLOG) corrective regimen sliding scale   SubCutaneous every 6 hours  loperamide 16 milliGRAM(s) Oral <User Schedule>  meropenem  IVPB 1000 milliGRAM(s) IV Intermittent every 8 hours  octreotide  Injectable 100 MICROGram(s) SubCutaneous three times a day  opium Tincture 6 milliGRAM(s) Oral <User Schedule>  pantoprazole    Tablet 40 milliGRAM(s) Oral before breakfast  Parenteral Nutrition - Adult 1 Each TPN Continuous <Continuous>  psyllium Powder 1 Packet(s) Oral three times a day  sodium chloride 0.9%. 1000 milliLiter(s) IV Continuous <Continuous>  tamsulosin 0.4 milliGRAM(s) Oral daily      PRN INPATIENT MEDICATION  acetaminophen   Tablet .. 975 milliGRAM(s) Oral every 6 hours PRN  ondansetron Injectable 4 milliGRAM(s) IV Push every 6 hours PRN        VITALS/PHYSICAL EXAM  --------------------------------------------------------------------------------  T(C): 37 (01-22-20 @ 03:00), Max: 37.3 (01-21-20 @ 11:00)  HR: 71 (01-22-20 @ 08:32) (67 - 101)  BP: 111/56 (01-22-20 @ 07:00) (88/55 - 160/76)  RR: 16 (01-22-20 @ 07:00) (12 - 31)  SpO2: 100% (01-22-20 @ 08:32) (89% - 100%)  Wt(kg): --        01-21-20 @ 07:01  -  01-22-20 @ 07:00  --------------------------------------------------------  IN: 2630 mL / OUT: 5210 mL / NET: -2580 mL    --------------------------------------------------------------------------------  	Gen: guarded but stable, A&Ox3, NC O2  	HEENT: NC/AT, PERRL, supple neck, trachea midline, mucosa moist              Chest:  decreased BS Lt base; Lt chest w/ CT x2 to wall suction (apex w/ serosanguinous                   drainage, base w/more sanguinous than serous drainage)  	GI: (+) BS, softly distended, non tender                    midline incision c/d/i w/o drainage                   (+)ostomy pink viable- thick liquidy stool              MSK: FROM x4, no contractures nor deformities  	Vascular: Equally Warm, (+)BLE mild edema,  no clubbing, cyanosis,                       BUE w/o edema, clubbing & cyanosis                       RUE PICC w/o sx infection   	Neuro: No focal deficits, intact sensation, weakened strength BUE<BLE  	Psych: Normal affect and mood        LABS/ CULTURES/ RADIOLOGY:              7.9    7.68  >-----------<  308      [01-22-20 @ 00:19]              26.1     138  |  97  |  7   ----------------------------<  139      [01-22-20 @ 00:19]  4.4   |  34  |  0.47        Ca     7.5     [01-22-20 @ 00:19]      Mg     2.0     [01-22-20 @ 00:19]      Phos  3.0     [01-22-20 @ 00:19]    TPro  5.4  /  Alb  2.3  /  TBili  0.4  /  DBili  0.2  /  AST  12  /  ALT  14  /  AlkPhos  66  [01-22-20 @ 00:19]      CAPILLARY BLOOD GLUCOSE  POCT Blood Glucose.: 142 mg/dL (22 Jan 2020 05:17)  POCT Blood Glucose.: 129 mg/dL (21 Jan 2020 23:56)  POCT Blood Glucose.: 195 mg/dL (21 Jan 2020 20:45)    Prealbumin, Serum: 13 mg/dL (01-21-20 @ 02:00)  Prealbumin, Serum: 15 mg/dL (01-15-20 @ 09:01)  Prealbumin, Serum: 13 mg/dL (01-14-20 @ 07:29)  Prealbumin, Serum: 13 mg/dL (01-07-20 @ 02:35)  Prealbumin, Serum: 14 mg/dL (01-06-20 @ 07:40)    Triglycerides, Serum: 76 mg/dL (01.21.20 @ 00:26)  Triglycerides, Serum: 51 mg/dL (01.15.20 @ 01:44)  Triglycerides, Serum: 60 mg/dL (01.14.20 @ 03:31)      < from: Xray Chest 1 View- PORTABLE-Routine (01.22.20 @ 06:47) >  Indication:    Left chest tube position.    Impression:    The heart isnormal in size. Bilateral small pleural effusion. Bibasilar pneumonia. A left chest tube is in place. No pneumothorax. A PICC line is seen on the right and tip is in the superior vena cava. No change in the appearance the chest when compared to previous study January 21, 2020.      < end of copied text >

## 2020-01-22 NOTE — PROGRESS NOTE ADULT - SUBJECTIVE AND OBJECTIVE BOX
Patient is a 75y old  Male who presents with a chief complaint of abd. pain (2020 09:00)      Vital Signs Last 24 Hrs  T(C): 37 (20 @ 03:00), Max: 37.3 (20 @ 11:00)  T(F): 98.6 (20 @ 03:00), Max: 99.1 (20 @ 11:00)  HR: 71 (20 08:32) (67 - 101)  BP: 111/56 (20 @ 07:00) (88/55 - 160/76)  RR: 16 (20 @ 07:00) (12 - 31)  SpO2: 100% (20 @ 08:32) (89% - 100%)            20 07:01  -  20 @ 07:00  --------------------------------------------------------  IN: 2630 mL / OUT: 5210 mL / NET: -2580 mL      Daily Weight in k.1 (2020 05:00)                          7.9    7.68  )-----------( 308      ( 2020 00:19 )             26.1     138  |  97  |  7   ----------------------------<  139<H>  4.4   |  34<H>  |  0.47<L>      PHYSICAL EXAM  Neurology: A&Ox3, NAD  CV : RRR+S1S2  Lungs: Respirations non-labored, B/L BS clear, diminished at bases  +Left pleural chest tubes x2 to water seal with serosanguinous drainage, no air leak  Abdomen: Soft, NT/ND, +BSx4Q  Extremities: B/L LE warm, no edema, +PP                 MEDICATIONS  acetaminophen   Tablet .. 975 milliGRAM(s) Oral every 6 hours PRN  acetylcysteine 20%  Inhalation 4 milliLiter(s) Inhalation three times a day  albuterol/ipratropium for Nebulization. 3 milliLiter(s) Nebulizer every 6 hours  buDESOnide    Inhalation Suspension 0.5 milliGRAM(s) Inhalation every 12 hours  chlorhexidine 2% Cloths 1 Application(s) Topical <User Schedule>  diphenoxylate/atropine 2 Tablet(s) Oral <User Schedule>  enoxaparin Injectable 40 milliGRAM(s) SubCutaneous daily  ergocalciferol 42556 Unit(s) Oral every week  fat emulsion (Fish Oil and Plant Based) 20% Infusion 12.5 mL/Hr IV Continuous <Continuous>  insulin lispro (HumaLOG) corrective regimen sliding scale   SubCutaneous every 6 hours  loperamide 16 milliGRAM(s) Oral <User Schedule>  meropenem  IVPB 1000 milliGRAM(s) IV Intermittent every 8 hours  octreotide  Injectable 100 MICROGram(s) SubCutaneous three times a day  ondansetron Injectable 4 milliGRAM(s) IV Push every 6 hours PRN  opium Tincture 6 milliGRAM(s) Oral <User Schedule>  pantoprazole    Tablet 40 milliGRAM(s) Oral before breakfast  Parenteral Nutrition - Adult 1 Each TPN Continuous <Continuous>  psyllium Powder 1 Packet(s) Oral three times a day  sodium chloride 0.9%. 1000 milliLiter(s) IV Continuous <Continuous>  tamsulosin 0.4 milliGRAM(s) Oral daily

## 2020-01-22 NOTE — PROGRESS NOTE ADULT - ASSESSMENT
76 y/o M presenting with septic shock and high grade SBO s/p exploratory laparotomy, lysis of adhesions, decompression of bowel via enterotomy w/ primary repair, and Abthera VAC placement on 12/6; s/p take down of Abthera, washout and re-application of the Abthera vac on 12/8. 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 s/p L chest tube placement by IR 12/30 for pleural effusion now s/p VATS, with chest tube insertion for drainage of pleural effusion. RRT called 1/13 AM for hypoxia, patient transferred back to SICU.  Patient continued SOB, chest drain possible obstruction expanding. Patent taken back to OR emergently 1/15 for clot evac and VATS.     PLAN:  - Diet as tolerated  - Continue Lomotil, loperamide, tincture of opium, octreotide  - Follow up CT surgery regarding chest tubes  - Appreciate Continued SICU care    [facility]  [id number]

## 2020-01-22 NOTE — PROGRESS NOTE ADULT - PROBLEM SELECTOR PLAN 1
s/p 1/10 Left VATS pleurx catheter placement  since removed  s/p 1/15 L VATS evacuation L hemothorax -  Continue chest tubes to water seal   Strict I & O's- monitor drainage from chest tubes   will consider  possible bedside pleuradesis when drainage   decreases  Daily CXR  with ct's  in place  Continue management as per primary team s/p 1/10 Left VATS pleurx catheter placement  since removed  s/p 1/15 L VATS evacuation of L hemothorax   Continue left pleural chest tubes x2 to water seal   Strict I & O's- monitor drainage from chest tubes     Possible bedside pleurodesis when drainage decreases  Daily CXR   Increase activity as tolerated  Continue management as per primary team s/p 1/10 Left VATS pleurx catheter placement since removed   s/p 1/15 L VATS evacuation of L hemothorax   Continue left pleural chest tubes x2 to water seal   Strict I & O's- monitor drainage from chest tubes     Possible bedside pleurodesis when drainage decreases  Daily CXR   Increase activity as tolerated  Continue management as per primary team s/p 1/10 Left VATS pleurx catheter placement since removed   s/p 1/15 L VATS evacuation of L hemothorax   Will d/c left base pleural chest - f/u CXR  Continue left apical pleural chest tube to water seal   Strict I & O's- monitor drainage from chest tube  Daily CXR   Increase activity as tolerated, OOB to chair  Continue management as per primary team

## 2020-01-23 LAB
ALBUMIN SERPL ELPH-MCNC: 2.3 G/DL — LOW (ref 3.3–5)
ALP SERPL-CCNC: 65 U/L — SIGNIFICANT CHANGE UP (ref 40–120)
ALT FLD-CCNC: 13 U/L — SIGNIFICANT CHANGE UP (ref 10–45)
ANION GAP SERPL CALC-SCNC: 7 MMOL/L — SIGNIFICANT CHANGE UP (ref 5–17)
AST SERPL-CCNC: 11 U/L — SIGNIFICANT CHANGE UP (ref 10–40)
BILIRUB DIRECT SERPL-MCNC: 0.1 MG/DL — SIGNIFICANT CHANGE UP (ref 0–0.2)
BILIRUB INDIRECT FLD-MCNC: 0.3 MG/DL — SIGNIFICANT CHANGE UP (ref 0.2–1)
BILIRUB SERPL-MCNC: 0.4 MG/DL — SIGNIFICANT CHANGE UP (ref 0.2–1.2)
BUN SERPL-MCNC: 12 MG/DL — SIGNIFICANT CHANGE UP (ref 7–23)
CALCIUM SERPL-MCNC: 7.7 MG/DL — LOW (ref 8.4–10.5)
CHLORIDE SERPL-SCNC: 94 MMOL/L — LOW (ref 96–108)
CO2 SERPL-SCNC: 36 MMOL/L — HIGH (ref 22–31)
CREAT SERPL-MCNC: 0.46 MG/DL — LOW (ref 0.5–1.3)
GLUCOSE BLDC GLUCOMTR-MCNC: 135 MG/DL — HIGH (ref 70–99)
GLUCOSE BLDC GLUCOMTR-MCNC: 137 MG/DL — HIGH (ref 70–99)
GLUCOSE BLDC GLUCOMTR-MCNC: 152 MG/DL — HIGH (ref 70–99)
GLUCOSE BLDC GLUCOMTR-MCNC: 188 MG/DL — HIGH (ref 70–99)
GLUCOSE SERPL-MCNC: 161 MG/DL — HIGH (ref 70–99)
HCT VFR BLD CALC: 24.6 % — LOW (ref 39–50)
HGB BLD-MCNC: 7.7 G/DL — LOW (ref 13–17)
MAGNESIUM SERPL-MCNC: 1.7 MG/DL — SIGNIFICANT CHANGE UP (ref 1.6–2.6)
MCHC RBC-ENTMCNC: 31 PG — SIGNIFICANT CHANGE UP (ref 27–34)
MCHC RBC-ENTMCNC: 31.3 GM/DL — LOW (ref 32–36)
MCV RBC AUTO: 99.2 FL — SIGNIFICANT CHANGE UP (ref 80–100)
NRBC # BLD: 0 /100 WBCS — SIGNIFICANT CHANGE UP (ref 0–0)
PHOSPHATE SERPL-MCNC: 2.3 MG/DL — LOW (ref 2.5–4.5)
PLATELET # BLD AUTO: 328 K/UL — SIGNIFICANT CHANGE UP (ref 150–400)
POTASSIUM SERPL-MCNC: 4.4 MMOL/L — SIGNIFICANT CHANGE UP (ref 3.5–5.3)
POTASSIUM SERPL-SCNC: 4.4 MMOL/L — SIGNIFICANT CHANGE UP (ref 3.5–5.3)
PROT SERPL-MCNC: 5.3 G/DL — LOW (ref 6–8.3)
RBC # BLD: 2.48 M/UL — LOW (ref 4.2–5.8)
RBC # FLD: 14.4 % — SIGNIFICANT CHANGE UP (ref 10.3–14.5)
SODIUM SERPL-SCNC: 137 MMOL/L — SIGNIFICANT CHANGE UP (ref 135–145)
VIT A SERPL-MCNC: 19 UG/DL — LOW (ref 22–69.5)
WBC # BLD: 8.26 K/UL — SIGNIFICANT CHANGE UP (ref 3.8–10.5)
WBC # FLD AUTO: 8.26 K/UL — SIGNIFICANT CHANGE UP (ref 3.8–10.5)

## 2020-01-23 PROCEDURE — 99232 SBSQ HOSP IP/OBS MODERATE 35: CPT

## 2020-01-23 PROCEDURE — 99024 POSTOP FOLLOW-UP VISIT: CPT

## 2020-01-23 PROCEDURE — 71045 X-RAY EXAM CHEST 1 VIEW: CPT | Mod: 26

## 2020-01-23 RX ORDER — ELECTROLYTE SOLUTION,INJ
1 VIAL (ML) INTRAVENOUS
Refills: 0 | Status: DISCONTINUED | OUTPATIENT
Start: 2020-01-23 | End: 2020-01-23

## 2020-01-23 RX ORDER — MAGNESIUM SULFATE 500 MG/ML
2 VIAL (ML) INJECTION ONCE
Refills: 0 | Status: COMPLETED | OUTPATIENT
Start: 2020-01-23 | End: 2020-01-23

## 2020-01-23 RX ORDER — I.V. FAT EMULSION 20 G/100ML
12.5 EMULSION INTRAVENOUS
Qty: 30 | Refills: 0 | Status: DISCONTINUED | OUTPATIENT
Start: 2020-01-23 | End: 2020-01-24

## 2020-01-23 RX ADMIN — SODIUM CHLORIDE 5 MILLILITER(S): 9 INJECTION INTRAMUSCULAR; INTRAVENOUS; SUBCUTANEOUS at 18:07

## 2020-01-23 RX ADMIN — Medication 1 PACKET(S): at 23:09

## 2020-01-23 RX ADMIN — Medication 50 GRAM(S): at 01:48

## 2020-01-23 RX ADMIN — Medication 3 MILLILITER(S): at 17:02

## 2020-01-23 RX ADMIN — PANTOPRAZOLE SODIUM 40 MILLIGRAM(S): 20 TABLET, DELAYED RELEASE ORAL at 07:36

## 2020-01-23 RX ADMIN — MORPHINE 6 MILLIGRAM(S): 10 SOLUTION ORAL at 17:13

## 2020-01-23 RX ADMIN — Medication 62.5 MILLIMOLE(S): at 01:48

## 2020-01-23 RX ADMIN — Medication 2: at 17:34

## 2020-01-23 RX ADMIN — Medication 1 PACKET(S): at 16:25

## 2020-01-23 RX ADMIN — Medication 16 MILLIGRAM(S): at 23:08

## 2020-01-23 RX ADMIN — Medication 2 TABLET(S): at 17:13

## 2020-01-23 RX ADMIN — Medication 3 MILLILITER(S): at 11:35

## 2020-01-23 RX ADMIN — MORPHINE 6 MILLIGRAM(S): 10 SOLUTION ORAL at 23:09

## 2020-01-23 RX ADMIN — Medication 2: at 05:34

## 2020-01-23 RX ADMIN — Medication 0.5 MILLIGRAM(S): at 17:03

## 2020-01-23 RX ADMIN — Medication 3 MILLILITER(S): at 05:29

## 2020-01-23 RX ADMIN — MEROPENEM 100 MILLIGRAM(S): 1 INJECTION INTRAVENOUS at 15:32

## 2020-01-23 RX ADMIN — Medication 3 MILLILITER(S): at 00:36

## 2020-01-23 RX ADMIN — ENOXAPARIN SODIUM 40 MILLIGRAM(S): 100 INJECTION SUBCUTANEOUS at 12:38

## 2020-01-23 RX ADMIN — Medication 4 MILLILITER(S): at 00:34

## 2020-01-23 RX ADMIN — Medication 1 EACH: at 16:35

## 2020-01-23 RX ADMIN — OCTREOTIDE ACETATE 100 MICROGRAM(S): 200 INJECTION, SOLUTION INTRAVENOUS; SUBCUTANEOUS at 23:08

## 2020-01-23 RX ADMIN — Medication 16 MILLIGRAM(S): at 16:24

## 2020-01-23 RX ADMIN — Medication 1 PACKET(S): at 05:34

## 2020-01-23 RX ADMIN — OCTREOTIDE ACETATE 100 MICROGRAM(S): 200 INJECTION, SOLUTION INTRAVENOUS; SUBCUTANEOUS at 05:34

## 2020-01-23 RX ADMIN — MORPHINE 6 MILLIGRAM(S): 10 SOLUTION ORAL at 07:36

## 2020-01-23 RX ADMIN — OCTREOTIDE ACETATE 100 MICROGRAM(S): 200 INJECTION, SOLUTION INTRAVENOUS; SUBCUTANEOUS at 17:07

## 2020-01-23 RX ADMIN — Medication 2 TABLET(S): at 23:09

## 2020-01-23 RX ADMIN — Medication 16 MILLIGRAM(S): at 07:37

## 2020-01-23 RX ADMIN — Medication 16 MILLIGRAM(S): at 17:09

## 2020-01-23 RX ADMIN — Medication 2 TABLET(S): at 12:38

## 2020-01-23 RX ADMIN — MORPHINE 6 MILLIGRAM(S): 10 SOLUTION ORAL at 12:39

## 2020-01-23 RX ADMIN — TAMSULOSIN HYDROCHLORIDE 0.4 MILLIGRAM(S): 0.4 CAPSULE ORAL at 12:38

## 2020-01-23 RX ADMIN — I.V. FAT EMULSION 12.5 ML/HR: 20 EMULSION INTRAVENOUS at 17:07

## 2020-01-23 RX ADMIN — Medication 2 TABLET(S): at 07:37

## 2020-01-23 RX ADMIN — MEROPENEM 100 MILLIGRAM(S): 1 INJECTION INTRAVENOUS at 06:30

## 2020-01-23 RX ADMIN — Medication 0.5 MILLIGRAM(S): at 05:30

## 2020-01-23 RX ADMIN — MEROPENEM 100 MILLIGRAM(S): 1 INJECTION INTRAVENOUS at 23:08

## 2020-01-23 RX ADMIN — CHLORHEXIDINE GLUCONATE 1 APPLICATION(S): 213 SOLUTION TOPICAL at 05:33

## 2020-01-23 NOTE — PROGRESS NOTE ADULT - SUBJECTIVE AND OBJECTIVE BOX
[facility] NUTRITION SUPPORT / TPN -- FOLLOW UP NOTE  --------------------------------------------------------------------------------    24 hour events/subjective:          Diet:  Diet, Mechanical Soft:   No Carb Prosource (1pkg = 15gms Protein)     Qty per Day:  2  MCT Oil (HNT Only)     Qty per Day:  15mL  Supplement Feeding Modality:  Oral  Ensure Enlive Cans or Servings Per Day:  2       Frequency:  Daily  Promote Cans or Servings Per Day:  2       Frequency:  Daily (01-17-20 @ 12:44)      Appetite: [  ]Poor [  ]Adequate [  ]Good  Caloric intake:  [   ]  Adequate   [   ] Inadequate    ROS: General/ GI see HPI  all other systems negative  pt unable to offer    ALLERGIES & MEDICATIONS  --------------------------------------------------------------------------------  ALLERGIES  Allergies    IV Contrast (Hives)    Intolerances        INTOLERANCES    STANDING INPATIENT MEDICATIONS    albuterol/ipratropium for Nebulization. 3 milliLiter(s) Nebulizer every 6 hours  buDESOnide    Inhalation Suspension 0.5 milliGRAM(s) Inhalation every 12 hours  chlorhexidine 2% Cloths 1 Application(s) Topical <User Schedule>  diphenoxylate/atropine 2 Tablet(s) Oral <User Schedule>  enoxaparin Injectable 40 milliGRAM(s) SubCutaneous daily  ergocalciferol 66006 Unit(s) Oral every week  fat emulsion (Fish Oil and Plant Based) 20% Infusion 12.5 mL/Hr IV Continuous <Continuous>  insulin lispro (HumaLOG) corrective regimen sliding scale   SubCutaneous every 6 hours  loperamide 16 milliGRAM(s) Oral <User Schedule>  meropenem  IVPB 1000 milliGRAM(s) IV Intermittent every 8 hours  octreotide  Injectable 100 MICROGram(s) SubCutaneous three times a day  opium Tincture 6 milliGRAM(s) Oral <User Schedule>  pantoprazole    Tablet 40 milliGRAM(s) Oral before breakfast  Parenteral Nutrition - Adult 1 Each TPN Continuous <Continuous>  Parenteral Nutrition - Adult 1 Each TPN Continuous <Continuous>  psyllium Powder 1 Packet(s) Oral three times a day  sodium chloride 0.9%. 1000 milliLiter(s) IV Continuous <Continuous>  tamsulosin 0.4 milliGRAM(s) Oral daily      PRN INPATIENT MEDICATION  acetaminophen   Tablet .. 975 milliGRAM(s) Oral every 6 hours PRN  ondansetron Injectable 4 milliGRAM(s) IV Push every 6 hours PRN        VITALS/PHYSICAL EXAM  --------------------------------------------------------------------------------  T(C): 36.6 (01-23-20 @ 12:00), Max: 37.6 (01-22-20 @ 19:00)  HR: 100 (01-23-20 @ 12:12) (76 - 106)  BP: 161/70 (01-23-20 @ 12:12) (90/52 - 161/70)  RR: 30 (01-23-20 @ 12:12) (14 - 42)  SpO2: 90% (01-23-20 @ 12:12) (90% - 100%)  Wt(kg): --        01-22-20 @ 07:01  -  01-23-20 @ 07:00  --------------------------------------------------------  IN: 3310 mL / OUT: 6580 mL / NET: -3270 mL    01-23-20 @ 07:01  -  01-23-20 @ 12:35  --------------------------------------------------------  IN: 300 mL / OUT: 1225 mL / NET: -925 mL    PHYSICAL EXAM:  --------------------------------------------------------------------------------  	Gen: guarded but stable, A&Ox3, NC O2  	HEENT: NC/AT, PERRL, supple neck, trachea midline, mucosa moist              Chest:  decreased BS Lt base; Lt chest w/ CT x1 to waterseal  	GI: (+) BS, softly distended, non tender                    midline incision c/d/i w/o drainage                   (+)ostomy pink viable- thick liquidy stool              MSK: FROM x4, no contractures nor deformities  	Vascular: Equally Warm, (+)BLE mild edema,  no clubbing, cyanosis,                       BUE w/o edema, clubbing & cyanosis                       RUE PICC w/o sx infection   	Neuro: No focal deficits, intact sensation, weakened strength BLE>BUE  	Psych: Normal affect and mood      LABS/ CULTURES/ RADIOLOGY:              7.7    8.26  >-----------<  328      [01-23-20 @ 00:33]              24.6     137  |  94  |  12  ----------------------------<  161      [01-23-20 @ 00:33]  4.4   |  36  |  0.46        Ca     7.7     [01-23-20 @ 00:33]      Mg     1.7     [01-23-20 @ 00:33]      Phos  2.3     [01-23-20 @ 00:33]    TPro  5.3  /  Alb  2.3  /  TBili  0.4  /  DBili  0.1  /  AST  11  /  ALT  13  /  AlkPhos  65  [01-23-20 @ 00:33]    Serum Osmolality 289      [01-22-20 @ 11:53]    CAPILLARY BLOOD GLUCOSE  POCT Blood Glucose.: 152 mg/dL (23 Jan 2020 05:31)  POCT Blood Glucose.: 136 mg/dL (22 Jan 2020 23:05)  POCT Blood Glucose.: 108 mg/dL (22 Jan 2020 17:44)    Prealbumin, Serum: 13 mg/dL (01-21-20 @ 02:00)  Prealbumin, Serum: 15 mg/dL (01-15-20 @ 09:01)  Prealbumin, Serum: 13 mg/dL (01-14-20 @ 07:29)  Prealbumin, Serum: 13 mg/dL (01-07-20 @ 02:35)  Prealbumin, Serum: 14 mg/dL (01-06-20 @ 07:40)    Triglycerides, Serum: 76 mg/dL (01.21.20 @ 00:26)  Triglycerides, Serum: 51 mg/dL (01.15.20 @ 01:44)  Triglycerides, Serum: 60 mg/dL (01.14.20 @ 03:31)    Vitamin A, Serum (01.19.20 @ 19:29)    Vitamin A, Serum: 19.0ug/dL  Reference intervals for vitamin [facility] determined [facility]  [facility]. Individuals with vitamin A less than 20  ug/dL are considered vitamin A deficient and those with  serum concentrations less than 10 ug/dL are considered  severely deficient.  This test was developed and its performance characteristics  determined by [facility]. It has not been cleared or  approved by the Food and Drug Administration.  Performed At: [address] [id number]  Paresh Roche MD Ph:[phone]     Vitamin E:  Alpha Tocopherol, Serum  9.2mg/L (01.18.20 @ 04:36)    beta-gamma-Tocopherol Measurement: 0.2mg/L  This test was developed and its performance characteristics  determined by [facility]. It has not been cleared or  approved by the Food and Drug Administration.  Reference intervals for alpha and gamma-tocopherol  determined from National Health and Nutrition Examination  Survey, 2775-3183. Individuals with alpha-tocopherol levels  less than 5.0 mg/L are considered vitamin E deficient.  Performed At: [address] [id number]  Paresh Roche MD Ph:[phone]    Vitamin D, 25-Hydroxy: 17.0 ng/mL (01.18.20 @ 04:19)  VITD Interpretive Data Result:  30.0-80.0 ng/mL Optimal levels (Reference Range)  >80.0 ng/mL Toxicity possible  20.0-29.0 ng/mL Insufficiency  10.0-19.0 ng/mL Mild to Moderate Deficiency  <10.0 ng/mL Severe Deficiency  Optimal levels for 25-Hydroxy vitamin D are 30.0 ng/mL and above based  upon the Endocrine Society guidelines 2011.  However, there is a lack of  consensus on this and the [place] of Medicine recommends 20.0 ng/mL and  above as optimal levels.  Vitamin D results may vary depending on the  method of analysis.  The Roche parveen e801 electrochemiluminescent  immunoassay method measures both D2 and D3.           < from: Xray Chest 1 View- PORTABLE-Routine (01.23.20 @ 06:43) >  FINDINGS:    1/22/2020 at 9:15 PM radiograph(s):    LINES/TUBES: There's been interval removal of the left basilar chest tube. Left apical chest tube is in situ. Right upper extremity PICC line terminates in the SVC.    LUNGS: Prominent bibasilar linear markings likely represent atelectasis. Lung parenchymal appearance is not significantly changed from recent prior chest radiographs.    PLEURA: Small right pleural effusion. No pneumothorax.    HEART AND MEDIASTINUM: Heart size is within normal limits.      1/23/2020 at 6:37 AM radiograph(s):    No significant interval change.      IMPRESSION:     Left basilar chest tube removal. No pneumothorax.    < end of copied text > [facility] NUTRITION SUPPORT / TPN -- FOLLOW UP NOTE  --------------------------------------------------------------------------------    24 hour events/subjective:  Pt feeling good today- One CT was removed  Ate breakfast- w/o issues today  yesterday, didn't feel so well after coughing fit so decreased appetite  No cough/ cp/ palp/dyspnea/ sob  No f/c/s  no N/V  High ostomy output w/ 2600mL over 24 hours             replacement with 0.25 cc per cc output with NS            continue lomotil, imodium, octreotide, tincture of opium at max doses  Improving Leukocytosis on meropenum       Diet:  Diet, Mechanical Soft:   No Carb Prosource (1pkg = 15gms Protein)     Qty per Day:  2  MCT Oil (HNT Only)     Qty per Day:  15mL  Supplement Feeding Modality:  Oral  Ensure Enlive Cans or Servings Per Day:  2       Frequency:  Daily  Promote Cans or Servings Per Day:  2       Frequency:  Daily (01-17-20 @ 12:44)      Appetite: [  ]Poor [  ]Adequate [  x ]Good  Caloric intake:  [ x  ]  Adequate   [   ] Inadequate    ROS: General/ GI see HPI  all other systems negative    ALLERGIES & MEDICATIONS  --------------------------------------------------------------------------------  ALLERGIES  IV Contrast (Hives)      STANDING INPATIENT MEDICATIONS    albuterol/ipratropium for Nebulization. 3 milliLiter(s) Nebulizer every 6 hours  buDESOnide    Inhalation Suspension 0.5 milliGRAM(s) Inhalation every 12 hours  chlorhexidine 2% Cloths 1 Application(s) Topical <User Schedule>  diphenoxylate/atropine 2 Tablet(s) Oral <User Schedule>  enoxaparin Injectable 40 milliGRAM(s) SubCutaneous daily  ergocalciferol 01120 Unit(s) Oral every week  fat emulsion (Fish Oil and Plant Based) 20% Infusion 12.5 mL/Hr IV Continuous <Continuous>  insulin lispro (HumaLOG) corrective regimen sliding scale   SubCutaneous every 6 hours  loperamide 16 milliGRAM(s) Oral <User Schedule>  meropenem  IVPB 1000 milliGRAM(s) IV Intermittent every 8 hours  octreotide  Injectable 100 MICROGram(s) SubCutaneous three times a day  opium Tincture 6 milliGRAM(s) Oral <User Schedule>  pantoprazole    Tablet 40 milliGRAM(s) Oral before breakfast  Parenteral Nutrition - Adult 1 Each TPN Continuous <Continuous>  Parenteral Nutrition - Adult 1 Each TPN Continuous <Continuous>  psyllium Powder 1 Packet(s) Oral three times a day  sodium chloride 0.9%. 1000 milliLiter(s) IV Continuous <Continuous>  tamsulosin 0.4 milliGRAM(s) Oral daily      PRN INPATIENT MEDICATION  acetaminophen   Tablet .. 975 milliGRAM(s) Oral every 6 hours PRN  ondansetron Injectable 4 milliGRAM(s) IV Push every 6 hours PRN        VITALS/PHYSICAL EXAM  --------------------------------------------------------------------------------  T(C): 36.6 (01-23-20 @ 12:00), Max: 37.6 (01-22-20 @ 19:00)  HR: 100 (01-23-20 @ 12:12) (76 - 106)  BP: 161/70 (01-23-20 @ 12:12) (90/52 - 161/70)  RR: 30 (01-23-20 @ 12:12) (14 - 42)  SpO2: 90% (01-23-20 @ 12:12) (90% - 100%)  Wt(kg): --        01-22-20 @ 07:01  -  01-23-20 @ 07:00  --------------------------------------------------------  IN: 3310 mL / OUT: 6580 mL / NET: -3270 mL    01-23-20 @ 07:01  -  01-23-20 @ 12:35  --------------------------------------------------------  IN: 300 mL / OUT: 1225 mL / NET: -925 mL    PHYSICAL EXAM:  --------------------------------------------------------------------------------  	Gen: guarded but stable, A&Ox3, NC O2  	HEENT: NC/AT, PERRL, supple neck, trachea midline, mucosa moist              Chest:  decreased BS Lt base; Lt chest w/ CT x1 to water seal  	GI: (+) BS, softly distended, non tender                    midline incision c/d/i w/o drainage                   (+)ostomy pink viable- thick liquidy stool              MSK: FROM x4, no contractures nor deformities  	Vascular: Equally Warm, (+)BLE mild edema,  no clubbing, cyanosis,                       BUE w/o edema, clubbing & cyanosis                       RUE PICC w/o sx infection   	Neuro: No focal deficits, intact sensation, weakened strength BLE>BUE  	Psych: Normal affect and mood      LABS/ CULTURES/ RADIOLOGY:              7.7    8.26  >-----------<  328      [01-23-20 @ 00:33]              24.6     137  |  94  |  12  ----------------------------<  161      [01-23-20 @ 00:33]  4.4   |  36  |  0.46        Ca     7.7     [01-23-20 @ 00:33]      Mg     1.7     [01-23-20 @ 00:33]      Phos  2.3     [01-23-20 @ 00:33]    TPro  5.3  /  Alb  2.3  /  TBili  0.4  /  DBili  0.1  /  AST  11  /  ALT  13  /  AlkPhos  65  [01-23-20 @ 00:33]    Serum Osmolality 289      [01-22-20 @ 11:53]    CAPILLARY BLOOD GLUCOSE  POCT Blood Glucose.: 152 mg/dL (23 Jan 2020 05:31)  POCT Blood Glucose.: 136 mg/dL (22 Jan 2020 23:05)  POCT Blood Glucose.: 108 mg/dL (22 Jan 2020 17:44)    Prealbumin, Serum: 13 mg/dL (01-21-20 @ 02:00)  Prealbumin, Serum: 15 mg/dL (01-15-20 @ 09:01)  Prealbumin, Serum: 13 mg/dL (01-14-20 @ 07:29)  Prealbumin, Serum: 13 mg/dL (01-07-20 @ 02:35)  Prealbumin, Serum: 14 mg/dL (01-06-20 @ 07:40)    Triglycerides, Serum: 76 mg/dL (01.21.20 @ 00:26)  Triglycerides, Serum: 51 mg/dL (01.15.20 @ 01:44)  Triglycerides, Serum: 60 mg/dL (01.14.20 @ 03:31)    Vitamin A, Serum (01.19.20 @ 19:29)    Vitamin A, Serum: 19.0ug/dL  Reference intervals for vitamin A determined from [facility]  internal studies. Individuals with vitamin A less than 20  ug/dL are considered vitamin A deficient and those with  serum concentrations less than 10 ug/dL are considered  severely deficient.  This test was developed and its performance characteristics  determined by [employer]. It has not been cleared or  approved by the Food and Drug Administration.  Performed At: [address] [id number]  Paresh Roche MD Ph:[phone]     Vitamin E:  Alpha Tocopherol, Serum  9.2mg/L (01.18.20 @ 04:36)    beta-gamma-Tocopherol Measurement: 0.2mg/L  This test was developed and its performance characteristics  determined by [employer]. It has not been cleared or  approved by the Food and Drug Administration.  Reference intervals for alpha and gamma-tocopherol  determined from National Health and Nutrition Examination  Survey, 3436-9580. Individuals with alpha-tocopherol levels  less than 5.0 mg/L are considered vitamin E deficient.  Performed At:  [facility] [id number]  Paresh Roche MD Ph:[phone]    Vitamin D, 25-Hydroxy: 17.0 ng/mL (01.18.20 @ 04:19)  VITD Interpretive Data Result:  30.0-80.0 ng/mL Optimal levels (Reference Range)  >80.0 ng/mL Toxicity possible  20.0-29.0 ng/mL Insufficiency  10.0-19.0 ng/mL Mild to Moderate Deficiency  <10.0 ng/mL Severe Deficiency  Optimal levels for 25-Hydroxy vitamin D are 30.0 ng/mL and above based  upon the Endocrine Society guidelines 2011.  However, there is a lack of  consensus on this and the [place] of Medicine recommends 20.0 ng/mL and  above as optimal levels.  Vitamin D results may vary depending on the  method of analysis.  The Roche parveen e801 electrochemiluminescent  immunoassay method measures both D2 and D3.           < from: Xray Chest 1 View- PORTABLE-Routine (01.23.20 @ 06:43) >  FINDINGS:    1/22/2020 at 9:15 PM radiograph(s):    LINES/TUBES: There's been interval removal of the left basilar chest tube. Left apical chest tube is in situ. Right upper extremity PICC line terminates in the SVC.    LUNGS: Prominent bibasilar linear markings likely represent atelectasis. Lung parenchymal appearance is not significantly changed from recent prior chest radiographs.    PLEURA: Small right pleural effusion. No pneumothorax.    HEART AND MEDIASTINUM: Heart size is within normal limits.      1/23/2020 at 6:37 AM radiograph(s):    No significant interval change.      IMPRESSION:     Left basilar chest tube removal. No pneumothorax.    < end of copied text >

## 2020-01-23 NOTE — PROGRESS NOTE ADULT - ASSESSMENT
A/P: 75 year old male w/ PMH of COPD and EtOH dependence with PSH/o appy, prostate surgery, & spine surgery  septic shock and high grade SBO s/p exploratory laparotomy, lysis of adhesions, decompression of bowel via enterotomy w/ primary repair, and Abthera VAC placement on 12/6; s/p take down of Abthera, washout and re-application of the Abthera vac on 12/8. Now s/p SBR and end ileostomy on 12/10.   TPN consulted to assist w/ management of pt's nutrition in pt w/ prolonged hospital course now tolerating diet but has high Ileostomy output.  Pt s/p Lt Chest Pigtail for effusion in IR with persistent high output on 1/10/2020 pt had VATs & placement of Left PleurX catheter. Pt op pt developed hemothorax and Respiratory Distress.  Pt is s/p Lt chest Evacuation of 2L blood w/ placement of CT x2 on 1/15/2020 with persistent Lt Pleural Effusion.    Pt remains in SICU w/ Persistent Lt Pleural Effusion, Rt Pleural Effusion vs PNA, and High Ostomy Output         Thoracic removed CT this am  Plan to Reverse Ileostomy in early February (tentative for 8wks post original  surgery)  Pt w/ improving severe Protein-Calorie Malnutrition-         TPN restarted at 1/2 Goal- pt eating sufficiently to make up approximately 1/2 of nutritional goals            Amino Acids 60g, Dextrose 140g, and Lipids 30g in 1200mL with 3mL MTE & 10mL MVI        Pt tolerating Soft Mechanical w/ Ensure & Prosource supplements, consider adding MTC oil 15mL              Calorie Count to finish             High Ostomy Output and Chest Tube output w/ suspected increased protein losses  Fecal fat testing suggestive of decreased absorption of fat           Supplements added to compensate for losses  Vit D levels low- Ergocalciferol supplements started  Vit A low- consider Vit A  Vit K INR/ PT near normal suggestive that it is being absorbed  VIt E Alpha Tocopherol- normal & beta-gamma-Tocopherol - low - consider VIt E  Strict Intake and Output -             Increased ostomy output- diet advanced -replete as per ICU (NS IVF 0.25:1 IVF q6h)            Continue to monitor while on octreotide, lomotil, tincture of opium, & imodium  BLE Edema- will continue to monitor - decreased Na & volume in TPN given  HypoPhos- improving w/ NaPhos 45mMol in TPN & will continue to monitor   Leukocytosis- improving - Meropenum as per ID  Hyperglycemia-improving      Fingersticks & ISS coverage - as per SICU  To continue monitoring of nutrition - Weights three times a week; Daily CMP, Mg, Ionized Ca, Phosphorus,        and Weekly Triglycerides and Pre-albumin  Continue as per SICU/Surgery, will follow with you, D/w primary team    Andreina Hubbard PA-C  TPN team, pager [phone]  D/w Dr Chacko

## 2020-01-23 NOTE — PROGRESS NOTE ADULT - ASSESSMENT
75 y/o M presenting with septic shock and high grade SBO s/p exploratory laparotomy, lysis of adhesions, decompression of bowel via enterotomy w/ primary repair, and Abthera VAC placement on 12/6; s/p take down of Abthera, washout and re-application of the Abthera vac on 12/8. Now s/p SBR and end ileostomy on 12/10 acute respiratory distress now improving, Pneumothorax, hyperglycemia, delirium. Now still with persistent pneumothorax when chest pigtail clamped.   12/23 VSS on room air recommending IR drainage of left chest  12/24 No evidence of air leak to left chest tube. Tube clamped. Repeat chest cxr in 4 hours. Anticipate chest tube removal if lung remains expanded. Drainage of loculated left effusion discussed with IR. IR to reconsult for drainage once initial tube removed. Discussed plan with Dr Mayes and IR Fellow  12/24  On NC ,    VSSchest xray sm lt pl eff, left chest tube dc'd.  12/26    2l NC   co  sob,  lt side diminshed  12/27 VSS, CXR with unchanged loculated left pleural effusion- IR consulted for pigtail placement, states effusion can be drained via thoracentesis, pigtail placement not indicated at this time - Dr. Mayes to speak with IR attending.   12/30 Patient with persistent PTX  and left pleural effusion now for IR drainage with Dr Elkins.   12/31 HD stable, L PTC w/ high output, serosanguinous drainage, 990 ml overnight/ 2200 in 24 hrs. Tolerating 2L NC supplemental O2, Cyto pending, continue monitor drainage output.   1/1: LPTC still with High output-400/24h. Continue with pigtail cath drain.  1/ 2     lt pigtail draining  on lws  1/3 HD stable, left pigtail catheter continues to drain. Incentive spirometer encouraged, OOB and mobility, continue pulmonary toileting. Maintain chest tube to suction and monitor output.   1/4: Chest tube milked to prevent clogging of tube, Continues to drain with high out put.  1/5 remains with high out put 170/700. Continue drainage  1/6  persistent drainage from lt pl tube  1/7 Left pigtail 150/450. Daily CXR. Planning for pleurex cath placement on Friday 1/10 w/ Dr. Mayes  1/8        Lt pleural tube   persistent draining    1/9 preop for pleurX placement tomorrow. CT clamped. NPO after MN, type and cross x2.   1/10 s/p VATS L pleurx cath placement   1/11 HD stable, no resp distress, maintain L pleurex to suction overnight, CXR in AM.   1/12 pleurex waterseal  1/13   RRT  called for respiratory distress   plx to pleurvac   back to lws   min drainage  no a/l  1/14 VSS, Left pleurx catheter minimal drainage 10ml/24h, CXR: small left pleural effusion with left pleurx catheter in place.     1/15 Overnight episode of bradycardia and respiratory distress that stabilized with BiPap support, concerned for anemia requiring 2 U PRBC and increase in left pleural effusion, minimal drainage in pleurx.  1/15 s/p bronch, Left VATS. Evacuation of 2L blood from L chest, pleurX removal, chest ubes x2  1/16 HD stable, minimal vasopressors, CXR stable, currently SBT, CT x2 remains to suction, draining.   1/17 HD stable, CXR stable, will water seal chest tubes today, repeat CXR in AM. Cont to monitor drainage.   1/18: L apical chest tube with small AL. CXr appear stable tiny left apical PTX seen on cxr. Continue chest tube to water seal for now as d/w Dr. Mayes today.   1/19 Remains with high out put from left apical. Will continue both chest tubes for today as d/w Dr. Mayes/Luis  1/20 Still with high output drainage remains serosanguenos. Continue drains for today  1/21  vss ,    2 chest tubes continue to drain  1/22 Left chest tube output: apex 270ml/24h and base 150ml/24h. CXR B/L small pleural effusions with bibasilar pna. Plan to d/c left base chest tube today as per Dr. Murray.  1/23 L angled/base tube removed last pm--CXR stble.  Left CT 80/24 hrs--no air leak.   Possible removed remaining CT in am

## 2020-01-23 NOTE — PROGRESS NOTE ADULT - SUBJECTIVE AND OBJECTIVE BOX
HISTORY  75y Male past medical history of COPD and EtOH dependence who presented on 12/6/2019 with abdominal pain, nausea, hematemesis, and poor PO intake for ~2-3 days. In the ED, he was found to be hypotensive & tachycardic. Labs revealed an CHI and lactic acidosis. Imaging revealed a high grade SBO in the RLQ on CT scan. Hospital course is as follows:  12/06 - s/p exploratory laparotomy, lysis of adhesions, decompression of bowel via enterotomy w/ primary repair, and Abthera VAC placement as the distal 50% of the bowel appeared dusky but was still viable, admitted to SICU post-operatively as he was on vasopressor support and was left intubated  12/08 - s/p re-exploration, proximal 165 cm of small bowel appeared pink & viable but beyond that had patchy areas of ischemia so decision was made to give the bowel more time to demarcate before resecting in order to preserve as much small bowel as possible so Abthera VAC was replaced  12/09 - s/p re-exploration, small bowel resection of 150 cm (150 cm remaining), ileocecetomy, end ileostomy, mucous fistula, and abdominal closure  12/11 - extubated to BiPAP, noted to be in SVT that was rate controlled w/ metoprolol  12/14 - started on TPN  12/15 - noted to have spontaneous left pneumothorax s/p left pigtail catheter placement, noted to be in SVT again so amiodarone started, high ileostomy output noted so concern for short bowel syndrome  12/16 - amiodarone discontinued, started on beta blocker with metoprolol  12/18 - started Lomotil and ileostomy repletions with IV fluids  12/19 - started Imodium, left pigtail catheter was placed to water seal but pneumothorax noted on follow-up CXR so placed back to suction  12/20 - started tincture of opium, concern for aspiration so changed diet from regular to dysphagia 1 pureed with nectar-thickened liquids  12/22 - left pigtail catheter placed to water seal with no pneumothorax noted on follow-up CXR, CT chest with moderate left pleural effusion not being drained by pigtail catheter  12/23 - 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  12/24 - left pigtail catheter discontinued  12/28 - started Ancef for erythematous midline wound  12/29 - beta blocker discontinued for intermittent episodes of hypotension  12/30 - left pigtail catheter placement by IR with drainage of serous transudative fluid  01/02 - FEES demonstrating penetration with thin liquids so patient kept on dysphagia 1 pureed with nectar-thickened liquids  01/07 - Transferred to floors  01/10 - s/p left VATS w/ PleurX catheter placement  01/11 - evaluated by speech & swallow, advanced diet to mechanical soft with thin liquids  01/12 - PleurX catheter placed to water seal with on pneumothorax on follow-up CXR  01/13 - RRT for hypoxemia, placed on BiPAP, PleurX catheter placed back to suction, started vancomycin & Zosyn for possible HCAP  01/14 - noted to have minimal output from PleurX catheter, lung ultrasound with large left pleural effusion concerning for hemothorax, multiple episodes of bradycardia secondary to hypoxemia, remains on BiPAP  01/15 - worsening respiratory distress requiring intubation, hypotensive requiring vasopressor support, CT chest with large left hemothorax w/ trace pneumothorax & extensive subcutaneous emphysema so taken to OR emergently for left VATS, evacuation fo 2 L of clot, and placement of two left chest tubes  01/16 - extubated, weaned off vasopressor support  01/17 - two left chest tubes placed to water seal with no pneumothorax noted on follow-up CXR            24 HOUR EVENTS:    SUBJECTIVE/ROS:  [ ] A ten-point review of systems was otherwise negative except as noted.  [ ] Due to altered mental status/intubation, subjective information were not able to be obtained from the patient. History was obtained, to the extent possible, from review of the chart and collateral sources of information.      NEURO  RASS:     GCS:     [facility] ICU:  Exam: awake, alert, oriented  Meds: acetaminophen   Tablet .. 975 milliGRAM(s) Oral every 6 hours PRN Mild Pain (1 - 3)  ondansetron Injectable 4 milliGRAM(s) IV Push every 6 hours PRN Nausea and/or Vomiting  opium Tincture 6 milliGRAM(s) Oral <User Schedule>    [x] Adequacy of sedation and pain control has been assessed and adjusted      RESPIRATORY  RR: 22 (01-23-20 @ 02:00) (12 - 38)  SpO2: 95% (01-23-20 @ 02:00) (92% - 100%)  Wt(kg): --  Exam: unlabored, clear to auscultation bilaterally  Mechanical Ventilation:     [N/A] Extubation Readiness Assessed  Meds: albuterol/ipratropium for Nebulization. 3 milliLiter(s) Nebulizer every 6 hours  buDESOnide    Inhalation Suspension 0.5 milliGRAM(s) Inhalation every 12 hours        CARDIOVASCULAR  HR: 85 (01-23-20 @ 02:00) (67 - 106)  BP: 113/57 (01-23-20 @ 02:00) (101/51 - 160/76)  BP(mean): 81 (01-23-20 @ 02:00) (73 - 113)  ABP: --  ABP(mean): --  Wt(kg): --  CVP(cm H2O): --      Exam: regular rate and rhythm  Cardiac Rhythm: sinus  Perfusion     [x]Adequate   [ ]Inadequate  Mentation   [x]Normal       [ ]Reduced  Extremities  [x]Warm         [ ]Cool  Volume Status [ ]Hypervolemic [x]Euvolemic [ ]Hypovolemic  Meds: tamsulosin 0.4 milliGRAM(s) Oral daily        GI/NUTRITION  Exam: soft, nontender, nondistended, incision C/D/I  Diet:  Meds: diphenoxylate/atropine 2 Tablet(s) Oral <User Schedule>  loperamide 16 milliGRAM(s) Oral <User Schedule>  pantoprazole    Tablet 40 milliGRAM(s) Oral before breakfast  psyllium Powder 1 Packet(s) Oral three times a day      GENITOURINARY  I&O's Detail    01-21 @ 07:01  -  01-22 @ 07:00  --------------------------------------------------------  IN:    fat emulsion (Fish Oil and Plant Based) 20% Infusion: 150 mL    Oral Fluid: 1030 mL    sodium chloride 0.9%.: 450 mL    Solution: 250 mL    Solution: 50 mL    TPN (Total Parenteral Nutrition): 700 mL  Total IN: 2630 mL    OUT:    Chest Tube: 270 mL    Chest Tube: 150 mL    Ileostomy: 1800 mL    Indwelling Catheter - Urethral: 2990 mL  Total OUT: 5210 mL    Total NET: -2580 mL      01-22 @ 07:01  -  01-23 @ 02:26  --------------------------------------------------------  IN:    fat emulsion (Fish Oil and Plant Based) 20% Infusion: 112.5 mL    Oral Fluid: 660 mL    sodium chloride 0.9%.: 300 mL    Solution: 350 mL    TPN (Total Parenteral Nutrition): 950 mL  Total IN: 2372.5 mL    OUT:    Chest Tube: 50 mL    Chest Tube: 25 mL    Ileostomy: 1200 mL    Indwelling Catheter - Urethral: 3075 mL  Total OUT: 4350 mL    Total NET: -1977.5 mL          01-23    137  |  94<L>  |  12  ----------------------------<  161<H>  4.4   |  36<H>  |  0.46<L>    Ca    7.7<L>      23 Jan 2020 00:33  Phos  2.3     01-23  Mg     1.7     01-23    TPro  5.3<L>  /  Alb  2.3<L>  /  TBili  0.4  /  DBili  0.1  /  AST  11  /  ALT  13  /  AlkPhos  65  01-23    [ ] Martinez catheter, indication: N/A  Meds: ergocalciferol 29639 Unit(s) Oral every week  fat emulsion (Fish Oil and Plant Based) 20% Infusion 12.5 mL/Hr IV Continuous <Continuous>  Parenteral Nutrition - Adult 1 Each TPN Continuous <Continuous>  sodium chloride 0.9%. 1000 milliLiter(s) IV Continuous <Continuous>        HEMATOLOGIC  Meds: enoxaparin Injectable 40 milliGRAM(s) SubCutaneous daily    [x] VTE Prophylaxis                        7.7    8.26  )-----------( 328      ( 23 Jan 2020 00:33 )             24.6       Transfusion     [ ] PRBC   [ ] Platelets   [ ] FFP   [ ] Cryoprecipitate      INFECTIOUS DISEASES  WBC Count: 8.26 K/uL (01-23 @ 00:33)    RECENT CULTURES:    Meds: meropenem  IVPB 1000 milliGRAM(s) IV Intermittent every 8 hours        ENDOCRINE  CAPILLARY BLOOD GLUCOSE      POCT Blood Glucose.: 136 mg/dL (22 Jan 2020 23:05)  POCT Blood Glucose.: 108 mg/dL (22 Jan 2020 17:44)  POCT Blood Glucose.: 142 mg/dL (22 Jan 2020 05:17)    Meds: insulin lispro (HumaLOG) corrective regimen sliding scale   SubCutaneous every 6 hours  octreotide  Injectable 100 MICROGram(s) SubCutaneous three times a day        ACCESS DEVICES:  [ ] Peripheral IV  [ ] Central Venous Line	[ ] R	[ ] L	[ ] IJ	[ ] Fem	[ ] SC	Placed:   [ ] Arterial Line		[ ] R	[ ] L	[ ] Fem	[ ] Rad	[ ] Ax	Placed:   [ ] PICC:					[ ] Mediport  [ ] Urinary Catheter, Date Placed:   [x] Necessity of urinary, arterial, and venous catheters discussed    OTHER MEDICATIONS:  chlorhexidine 2% Cloths 1 Application(s) Topical <User Schedule>      CODE STATUS:      IMAGING: HISTORY  75y Male past medical history of COPD and EtOH dependence who presented on 12/6/2019 with abdominal pain, nausea, hematemesis, and poor PO intake for ~2-3 days. In the ED, he was found to be hypotensive & tachycardic. Labs revealed an CHI and lactic acidosis. Imaging revealed a high grade SBO in the RLQ on CT scan. Hospital course is as follows:  12/06 - s/p exploratory laparotomy, lysis of adhesions, decompression of bowel via enterotomy w/ primary repair, and Abthera VAC placement as the distal 50% of the bowel appeared dusky but was still viable, admitted to SICU post-operatively as he was on vasopressor support and was left intubated  12/08 - s/p re-exploration, proximal 165 cm of small bowel appeared pink & viable but beyond that had patchy areas of ischemia so decision was made to give the bowel more time to demarcate before resecting in order to preserve as much small bowel as possible so Abthera VAC was replaced  12/09 - s/p re-exploration, small bowel resection of 150 cm (150 cm remaining), ileocecetomy, end ileostomy, mucous fistula, and abdominal closure  12/11 - extubated to BiPAP, noted to be in SVT that was rate controlled w/ metoprolol  12/14 - started on TPN  12/15 - noted to have spontaneous left pneumothorax s/p left pigtail catheter placement, noted to be in SVT again so amiodarone started, high ileostomy output noted so concern for short bowel syndrome  12/16 - amiodarone discontinued, started on beta blocker with metoprolol  12/18 - started Lomotil and ileostomy repletions with IV fluids  12/19 - started Imodium, left pigtail catheter was placed to water seal but pneumothorax noted on follow-up CXR so placed back to suction  12/20 - started tincture of opium, concern for aspiration so changed diet from regular to dysphagia 1 pureed with nectar-thickened liquids  12/22 - left pigtail catheter placed to water seal with no pneumothorax noted on follow-up CXR, CT chest with moderate left pleural effusion not being drained by pigtail catheter  12/23 - 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  12/24 - left pigtail catheter discontinued  12/28 - started Ancef for erythematous midline wound  12/29 - beta blocker discontinued for intermittent episodes of hypotension  12/30 - left pigtail catheter placement by IR with drainage of serous transudative fluid  01/02 - FEES demonstrating penetration with thin liquids so patient kept on dysphagia 1 pureed with nectar-thickened liquids  01/07 - Transferred to floors  01/10 - s/p left VATS w/ PleurX catheter placement  01/11 - evaluated by speech & swallow, advanced diet to mechanical soft with thin liquids  01/12 - PleurX catheter placed to water seal with on pneumothorax on follow-up CXR  01/13 - RRT for hypoxemia, placed on BiPAP, PleurX catheter placed back to suction, started vancomycin & Zosyn for possible HCAP  01/14 - noted to have minimal output from PleurX catheter, lung ultrasound with large left pleural effusion concerning for hemothorax, multiple episodes of bradycardia secondary to hypoxemia, remains on BiPAP  01/15 - worsening respiratory distress requiring intubation, hypotensive requiring vasopressor support, CT chest with large left hemothorax w/ trace pneumothorax & extensive subcutaneous emphysema so taken to OR emergently for left VATS, evacuation fo 2 L of clot, and placement of two left chest tubes  01/16 - extubated, weaned off vasopressor support  01/17 - two left chest tubes placed to water seal with no pneumothorax noted on follow-up CXR        24 HOUR EVENTS:  - inferior chest tube discontinued by CT surg, CXR negative for new findings  - high UOP >3L, urinelytes sent, normal      SUBJECTIVE/ROS:  [x] A ten-point review of systems was otherwise negative except as noted.  [ ] Due to altered mental status/intubation, subjective information were not able to be obtained from the patient. History was obtained, to the extent possible, from review of the chart and collateral sources of information.      NEURO  Exam: awake, alert, oriented  Meds: acetaminophen   Tablet .. 975 milliGRAM(s) Oral every 6 hours PRN Mild Pain (1 - 3)  ondansetron Injectable 4 milliGRAM(s) IV Push every 6 hours PRN Nausea and/or Vomiting  opium Tincture 6 milliGRAM(s) Oral <User Schedule>    [x] Adequacy of sedation and pain control has been assessed and adjusted    RESPIRATORY  RR: 22 (01-23-20 @ 02:00) (12 - 38)  SpO2: 95% (01-23-20 @ 02:00) (92% - 100%)  Wt(kg): --  Exam: unlabored, clear to auscultation bilaterally  Mechanical Ventilation:     [N/A] Extubation Readiness Assessed  Meds: albuterol/ipratropium for Nebulization. 3 milliLiter(s) Nebulizer every 6 hours  buDESOnide    Inhalation Suspension 0.5 milliGRAM(s) Inhalation every 12 hours      CARDIOVASCULAR  HR: 85 (01-23-20 @ 02:00) (67 - 106)  BP: 113/57 (01-23-20 @ 02:00) (101/51 - 160/76)  BP(mean): 81 (01-23-20 @ 02:00) (73 - 113)    Exam: regular rate and rhythm  Cardiac Rhythm: sinus  Perfusion     [x]Adequate   [ ]Inadequate  Mentation   [x]Normal       [ ]Reduced  Extremities  [x]Warm         [ ]Cool  Volume Status [ ]Hypervolemic [x]Euvolemic [ ]Hypovolemic  Meds: tamsulosin 0.4 milliGRAM(s) Oral daily      GI/NUTRITION  Exam: soft, nontender, nondistended, incision C/D/I  Diet: regular  Meds: diphenoxylate/atropine 2 Tablet(s) Oral <User Schedule>  loperamide 16 milliGRAM(s) Oral <User Schedule>  pantoprazole    Tablet 40 milliGRAM(s) Oral before breakfast  psyllium Powder 1 Packet(s) Oral three times a day      GENITOURINARY  I&O's Detail    01-21 @ 07:01 - 01-22 @ 07:00  --------------------------------------------------------  IN:    fat emulsion (Fish Oil and Plant Based) 20% Infusion: 150 mL    Oral Fluid: 1030 mL    sodium chloride 0.9%.: 450 mL    Solution: 250 mL    Solution: 50 mL    TPN (Total Parenteral Nutrition): 700 mL  Total IN: 2630 mL    OUT:    Chest Tube: 270 mL    Chest Tube: 150 mL    Ileostomy: 1800 mL    Indwelling Catheter - Urethral: 2990 mL  Total OUT: 5210 mL    Total NET: -2580 mL      01-22 @ 07:01  -  01-23 @ 02:26  --------------------------------------------------------  IN:    fat emulsion (Fish Oil and Plant Based) 20% Infusion: 112.5 mL    Oral Fluid: 660 mL    sodium chloride 0.9%.: 300 mL    Solution: 350 mL    TPN (Total Parenteral Nutrition): 950 mL  Total IN: 2372.5 mL    OUT:    Chest Tube: 50 mL    Chest Tube: 25 mL    Ileostomy: 1200 mL    Indwelling Catheter - Urethral: 3075 mL  Total OUT: 4350 mL    Total NET: -1977.5 mL      01-23    137  |  94<L>  |  12  ----------------------------<  161<H>  4.4   |  36<H>  |  0.46<L>    Ca    7.7<L>      23 Jan 2020 00:33  Phos  2.3     01-23  Mg     1.7     01-23    TPro  5.3<L>  /  Alb  2.3<L>  /  TBili  0.4  /  DBili  0.1  /  AST  11  /  ALT  13  /  AlkPhos  65  01-23    [ ] Martinez catheter, indication: N/A  Meds: ergocalciferol 21189 Unit(s) Oral every week  fat emulsion (Fish Oil and Plant Based) 20% Infusion 12.5 mL/Hr IV Continuous <Continuous>  Parenteral Nutrition - Adult 1 Each TPN Continuous <Continuous>  sodium chloride 0.9%. 1000 milliLiter(s) IV Continuous <Continuous>        HEMATOLOGIC  Meds: enoxaparin Injectable 40 milliGRAM(s) SubCutaneous daily    [x] VTE Prophylaxis                        7.7    8.26  )-----------( 328      ( 23 Jan 2020 00:33 )             24.6       Transfusion     [ ] PRBC   [ ] Platelets   [ ] FFP   [ ] Cryoprecipitate      INFECTIOUS DISEASES  WBC Count: 8.26 K/uL (01-23 @ 00:33)    RECENT CULTURES:    Meds: meropenem  IVPB 1000 milliGRAM(s) IV Intermittent every 8 hours    POCT Blood Glucose.: 136 mg/dL (22 Jan 2020 23:05)  POCT Blood Glucose.: 108 mg/dL (22 Jan 2020 17:44)  POCT Blood Glucose.: 142 mg/dL (22 Jan 2020 05:17)    Meds: insulin lispro (HumaLOG) corrective regimen sliding scale   SubCutaneous every 6 hours  octreotide  Injectable 100 MICROGram(s) SubCutaneous three times a day    ACCESS DEVICES:  [x] Peripheral IV  [ ] Central Venous Line	[ ] R	[ ] L	[ ] IJ	[ ] Fem	[ ] SC	Placed:   [ ] Arterial Line		[ ] R	[ ] L	[ ] Fem	[ ] Rad	[ ] Ax	Placed:   [x] PICC:					[ ] Mediport  [ ] Urinary Catheter, Date Placed:   [x] Necessity of urinary, arterial, and venous catheters discussed    OTHER MEDICATIONS:  chlorhexidine 2% Cloths 1 Application(s) Topical <User Schedule>      CODE STATUS: full

## 2020-01-23 NOTE — PROGRESS NOTE ADULT - ASSESSMENT
76 y/o M presenting with septic shock and high grade SBO s/p exploratory laparotomy, lysis of adhesions, decompression of bowel via enterotomy w/ primary repair, and Abthera VAC placement on 12/6; s/p take down of Abthera, washout and re-application of the Abthera vac on 12/8. 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 s/p L chest tube placement by IR 12/30 for pleural effusion now s/p VATS, with chest tube insertion for drainage of pleural effusion. RRT called 1/13 AM for hypoxia, patient transferred back to SICU.  Patient continued SOB, chest drain possible obstruction expanding. Patent taken back to OR emergently 1/15 for clot evac and VATS.     PLAN:  - Diet as tolerated  - Continue Lomotil, loperamide, tincture of opium, octreotide  - Follow up CT surgery regarding chest tubes  - Appreciate Continued SICU care    [facility]  [id number]

## 2020-01-23 NOTE — PROGRESS NOTE ADULT - ATTENDING COMMENTS
Patient seen and examined  States he didn't have much of an appetite today  Denies nausea or vomiting    On exam; supplemental O2 via NC  One chest tube in place  Abd is soft, not tender and not distended  Ileostomy is pink, viable and with liquid output    - Appreciate continued SICU care  - Diet as tolerated  - Anti-motility agents  - TPN

## 2020-01-23 NOTE — PROGRESS NOTE ADULT - SUBJECTIVE AND OBJECTIVE BOX
Subjective:  feels better      Vital Signs:  Vital Signs Last 24 Hrs  T(C): 36.6 (01-23-20 @ 12:00), Max: 37.6 (01-22-20 @ 19:00)  T(F): 97.9 (01-23-20 @ 12:00), Max: 99.7 (01-22-20 @ 19:00)  HR: 100 (01-23-20 @ 12:12) (76 - 106)  BP: 161/70 (01-23-20 @ 12:12) (90/52 - 161/70)  RR: 30 (01-23-20 @ 12:12) (14 - 42)  SpO2: 90% (01-23-20 @ 12:12) (90% - 100%) on (O2)                          7.7    8.26  )-----------( 328      ( 23 Jan 2020 00:33 )             24.6     01-23    137  |  94<L>  |  12  ----------------------------<  161<H>  4.4   |  36<H>  |  0.46<L>    Ca    7.7<L>      23 Jan 2020 00:33  Phos  2.3     01-23  Mg     1.7     01-23    TPro  5.3<L>  /  Alb  2.3<L>  /  TBili  0.4  /  DBili  0.1  /  AST  11  /  ALT  13  /  AlkPhos  65  01-23      MEDICATIONS  (STANDING):  albuterol/ipratropium for Nebulization. 3 milliLiter(s) Nebulizer every 6 hours  buDESOnide    Inhalation Suspension 0.5 milliGRAM(s) Inhalation every 12 hours  chlorhexidine 2% Cloths 1 Application(s) Topical <User Schedule>  diphenoxylate/atropine 2 Tablet(s) Oral <User Schedule>  enoxaparin Injectable 40 milliGRAM(s) SubCutaneous daily  ergocalciferol 37417 Unit(s) Oral every week  fat emulsion (Fish Oil and Plant Based) 20% Infusion 12.5 mL/Hr (12.5 mL/Hr) IV Continuous <Continuous>  insulin lispro (HumaLOG) corrective regimen sliding scale   SubCutaneous every 6 hours  loperamide 16 milliGRAM(s) Oral <User Schedule>  meropenem  IVPB 1000 milliGRAM(s) IV Intermittent every 8 hours  octreotide  Injectable 100 MICROGram(s) SubCutaneous three times a day  opium Tincture 6 milliGRAM(s) Oral <User Schedule>  pantoprazole    Tablet 40 milliGRAM(s) Oral before breakfast  Parenteral Nutrition - Adult 1 Each (50 mL/Hr) TPN Continuous <Continuous>  Parenteral Nutrition - Adult 1 Each (50 mL/Hr) TPN Continuous <Continuous>  psyllium Powder 1 Packet(s) Oral three times a day  sodium chloride 0.9%. 1000 milliLiter(s) (5 mL/Hr) IV Continuous <Continuous>  tamsulosin 0.4 milliGRAM(s) Oral daily          Physical exam    lungs clear   Left CT 80cc/24hrs  no air leak.  dressing CDI      22 Jan 2020 07:01  -  23 Jan 2020 07:00  --------------------------------------------------------  IN: 3310 mL / OUT: 6580 mL / NET: -3270 mL    23 Jan 2020 07:01  -  23 Jan 2020 12:26  --------------------------------------------------------  IN: 200 mL / OUT: 775 mL / NET: -575 mL

## 2020-01-23 NOTE — PROGRESS NOTE ADULT - SUBJECTIVE AND OBJECTIVE BOX
Subjective: Patient seen and examined. No new events except as noted.   remains in ICU   feels ok   No cp or sob       REVIEW OF SYSTEMS:    CONSTITUTIONAL: +weakness, fevers or chills  EYES/ENT: No visual changes;  No vertigo or throat pain   NECK: No pain or stiffness  RESPIRATORY: No cough, wheezing, hemoptysis; No shortness of breath  CARDIOVASCULAR: No chest pain or palpitations  GASTROINTESTINAL: No abdominal or epigastric pain. No nausea, vomiting, or hematemesis; No diarrhea or constipation. No melena or hematochezia.  GENITOURINARY: No dysuria, frequency or hematuria  NEUROLOGICAL: No numbness or weakness  SKIN: No itching, burning, rashes, or lesions   All other review of systems is negative unless indicated above.    MEDICATIONS:  MEDICATIONS  (STANDING):  albuterol/ipratropium for Nebulization. 3 milliLiter(s) Nebulizer every 6 hours  buDESOnide    Inhalation Suspension 0.5 milliGRAM(s) Inhalation every 12 hours  chlorhexidine 2% Cloths 1 Application(s) Topical <User Schedule>  diphenoxylate/atropine 2 Tablet(s) Oral <User Schedule>  enoxaparin Injectable 40 milliGRAM(s) SubCutaneous daily  ergocalciferol 78466 Unit(s) Oral every week  fat emulsion (Fish Oil and Plant Based) 20% Infusion 12.5 mL/Hr (12.5 mL/Hr) IV Continuous <Continuous>  insulin lispro (HumaLOG) corrective regimen sliding scale   SubCutaneous every 6 hours  loperamide 16 milliGRAM(s) Oral <User Schedule>  meropenem  IVPB 1000 milliGRAM(s) IV Intermittent every 8 hours  octreotide  Injectable 100 MICROGram(s) SubCutaneous three times a day  opium Tincture 6 milliGRAM(s) Oral <User Schedule>  pantoprazole    Tablet 40 milliGRAM(s) Oral before breakfast  Parenteral Nutrition - Adult 1 Each (50 mL/Hr) TPN Continuous <Continuous>  Parenteral Nutrition - Adult 1 Each (50 mL/Hr) TPN Continuous <Continuous>  psyllium Powder 1 Packet(s) Oral three times a day  sodium chloride 0.9%. 1000 milliLiter(s) (5 mL/Hr) IV Continuous <Continuous>  tamsulosin 0.4 milliGRAM(s) Oral daily      PHYSICAL EXAM:  T(C): 36.5 (01-23-20 @ 08:00), Max: 37.6 (01-22-20 @ 19:00)  HR: 100 (01-23-20 @ 12:12) (76 - 106)  BP: 161/70 (01-23-20 @ 12:12) (90/52 - 161/70)  RR: 30 (01-23-20 @ 12:12) (14 - 38)  SpO2: 90% (01-23-20 @ 12:12) (90% - 100%)  Wt(kg): --  I&O's Summary    22 Jan 2020 07:01  -  23 Jan 2020 07:00  --------------------------------------------------------  IN: 3310 mL / OUT: 6580 mL / NET: -3270 mL    23 Jan 2020 07:01  -  23 Jan 2020 12:22  --------------------------------------------------------  IN: 200 mL / OUT: 775 mL / NET: -575 mL          Appearance: NAD   HEENT:   Dry oral mucosa, crusted lips   Lymphatic: No lymphadenopathy   Cardiovascular: Normal S1 S2, No JVD, No murmurs , Peripheral pulses palpable 2+ bilaterally  Respiratory: Decreased bs R sided + pigtail   Gastrointestinal:  Soft, NT, ND. Ostomy pink with output. Midline staples in place, midline incision site is c/d/i   Skin: No rashes, No ecchymoses, No cyanosis, warm to touch  Musculoskeletal: Decreased  range of motion and strength  Psychiatry:  mildly sedated   Ext: No edema +PICC line   +rosas       LABS:    CARDIAC MARKERS:                                7.7    8.26  )-----------( 328      ( 23 Jan 2020 00:33 )             24.6     01-23    137  |  94<L>  |  12  ----------------------------<  161<H>  4.4   |  36<H>  |  0.46<L>    Ca    7.7<L>      23 Jan 2020 00:33  Phos  2.3     01-23  Mg     1.7     01-23    TPro  5.3<L>  /  Alb  2.3<L>  /  TBili  0.4  /  DBili  0.1  /  AST  11  /  ALT  13  /  AlkPhos  65  01-23    proBNP:   Lipid Profile:   HgA1c:   TSH:             TELEMETRY: 	SR    ECG:  	  RADIOLOGY:   < from: Xray Chest 1 View- PORTABLE-Routine (01.23.20 @ 06:43) >    EXAM:  XR CHEST PORTABLE ROUTINE 1V                          EXAM:  XR CHEST PORTABLE ROUTINE 1V                            PROCEDURE DATE:  01/22/2020            INTERPRETATION:  CLINICAL INFORMATION: Status post removal of left basilar chest tube. Status post LVATS.    TECHNIQUE: Frontal radiograph of the chest from 1/22/2020 at 9:15 PM and 1/23/2020 at 6:37 AM.    COMPARISON: Chest radiograph 1/22/2020 at 6:45 AM.    FINDINGS:    1/22/2020 at 9:15 PM radiograph(s):    LINES/TUBES: There's been interval removal of the left basilar chest tube. Left apical chest tube is in situ. Right upper extremity PICC line terminates in the SVC.    LUNGS: Prominent bibasilar linear markings likely represent atelectasis. Lung parenchymal appearance is not significantly changed from recent prior chest radiographs.    PLEURA: Small right pleural effusion. No pneumothorax.    HEART AND MEDIASTINUM: Heart size is within normal limits.      1/23/2020 at 6:37 AM radiograph(s):    No significant interval change.      IMPRESSION:     Left basilar chest tube removal. No pneumothorax.                PATO MOHAMUD M.D., RADIOLOGY RESIDENT  This document has been electronically signed.  ANNETTA FERREIRA M.D., ATTENDING RADIOLOGIST  This document has been electronically signed. Jan 23 2020 10:36AM                < end of copied text >    DIAGNOSTIC TESTING:  [ ] Echocardiogram:  [ ]  Catheterization:  [ ] Stress Test:    OTHER:

## 2020-01-23 NOTE — PROGRESS NOTE ADULT - ASSESSMENT
75 y/o male presenting with high grade SBO s/p exploratory laparotomy, lysis of adhesions, decompression of bowel via enterotomy w/ primary repair, & Abthera VAC placement (12/06/2019); RTOR for re-exploration w/ Abthera VAC replacement (12/08/2019) to allow for demarcation of ischemic small bowel; RTOR for re-exploration, small bowel resection of 150 cm (150 cm remaining), ileocecetomy, end ileostomy, mucous fistula, and abdominal closure (12/10/2019); hospital course complicated by SVT, short bowel syndrome requiring repletions w/ IV fluids, malnutrition requiring TPN, intermittent episodes of hypotension, spontaneous left pneumothorax s/p pigtail catheter (12/15/2019-12/24/2019), left pleural effusion s/p pigtail catheter w/ IR (12/30/2019), dysphagia, and oral HSV lesions, placement of Pleurx on 1/10, now re-admitted to SICU with acute hypoxic respiratory failure.     Neuro: no acute issues  - Pain control as needed with acetaminophen    Resp: COPD, spontaneous left pneumothorax s/p pigtail catheter, left pleural effusion s/p Pleurx 1/10.  Intubated for respiratory distress s/p VATS 01/15 with 2 Liter of blood evacuation   - Cont chest tube x 1 to waterseal  - Pulmicort and Duoneb for COPD  - Will need outpatient follow-up with a pulmonologist for his COPD as patient does not currently have one  - Follow up CXR    CV: Bradycardia, SVT.  - No interval dysrhythmias   - Monitor vital signs    GI: s/p exploratory laparotomy, [place]ia, decompression of bowel via enterotomy w/ primary repair, & Abthera VAC placement (12/06/2019); re-exploration w/ ABthera VAC replacement (12/08/2019); re-exploration, small bowel resection of 150 cm (150 cm remaining), ileocecectomy end ileostomy, mucous fistula, and abdominal closure (12/10/2019); short bowel syndrome, malnutrition, dysphagia  - Dysphagia diet   - Monitor ostomy output 1.8L past 24hr  - Continue Lomotil, tincture of opium, loperamide, and octreotide for short bowel syndrome  - Protonix to decrease gastric secretions  - Continue 0.25/1 ostomy repletions    Renal: no acute issues  - Monitor I&Os  - Monitor electrolytes and replete as necessary  - rosas catheter in place    Heme: anemia likely secondary to malnutrition / malabsorption  - Monitor CBC and coags  - Q12 CBCs   - Lovenox for VTE prophylaxis    ID: oral HSV lesions (s/p acyclovir 12/23/2019-01/02/2020)  - Combicath 1/16 with pseudomonas resistant to Zosyn  - Blood cx 1/16 NGTD.   - Meropenem    Endo: no acute issues  - Monitor glucose before meals & at bedtime while on TPN  - Insulin sliding scale.    Disposition:  - Will remain in SICU

## 2020-01-23 NOTE — PROGRESS NOTE ADULT - SUBJECTIVE AND OBJECTIVE BOX
Surgery Progress Note    SUBJECTIVE/24 HOUR EVENTS:  - inferior chest tube discontinued by CT surg, CXR negative for new findings  - high UOP >3L, urine electrolytes sent, normal  - Patient seen and examined at bedside   - Patient states feeling ok  - Denies abdominal pain, Chest pain, SOB, N/V  --------------------------------------------------------------------------------------------------  OBJECTIVE:   Physical Exam:  General: AAOx3, NAD, lying comfortably in bed  HEENT: NC/AT  Respiratory: nonlabored breathing  Cardiovascular: RRR, normal S1 and S2, no murmurs or gallops  Abdomen: non-distended, soft, non-tender  Extremities: WWP, no edema  CT Apical: SS fluid output, no leak  Ostomy: pink and viable, semiformed stool and air in bag.  --------------------------------------------------------------------------------------------------  V/S:  Vital Signs Last 24 Hrs  T(C): 36.5 (23 Jan 2020 08:00), Max: 37.6 (22 Jan 2020 19:00)  T(F): 97.7 (23 Jan 2020 08:00), Max: 99.7 (22 Jan 2020 19:00)  HR: 83 (23 Jan 2020 08:33) (76 - 106)  BP: 115/58 (23 Jan 2020 08:00) (90/52 - 156/70)  BP(mean): 81 (23 Jan 2020 08:00) (65 - 113)  RR: 20 (23 Jan 2020 08:00) (14 - 38)  SpO2: 97% (23 Jan 2020 08:33) (92% - 100%)    --------------------------------------------------------------------------------------------------  I/Os:    22 Jan 2020 07:01  -  23 Jan 2020 07:00  --------------------------------------------------------  IN:    fat emulsion (Fish Oil and Plant Based) 20% Infusion: 150 mL    Oral Fluid: 660 mL    sodium chloride 0.9%.: 650 mL    Solution: 250 mL    Solution: 400 mL    TPN (Total Parenteral Nutrition): 1200 mL  Total IN: 3310 mL    OUT:    Chest Tube: 50 mL    Chest Tube: 80 mL    Ileostomy: 2600 mL    Indwelling Catheter - Urethral: 3850 mL  Total OUT: 6580 mL    Total NET: -3270 mL      23 Jan 2020 07:01  -  23 Jan 2020 09:35  --------------------------------------------------------  IN:    TPN (Total Parenteral Nutrition): 100 mL  Total IN: 100 mL    OUT:    Indwelling Catheter - Urethral: 225 mL  Total OUT: 225 mL    Total NET: -125 mL        --------------------------------------------------------------------------------------------------  LABS:                        7.7    8.26  )-----------( 328      ( 23 Jan 2020 00:33 )             24.6     23 Jan 2020 00:33    137    |  94     |  12     ----------------------------<  161    4.4     |  36     |  0.46     Ca    7.7        23 Jan 2020 00:33  Phos  2.3       23 Jan 2020 00:33  Mg     1.7       23 Jan 2020 00:33    TPro  5.3    /  Alb  2.3    /  TBili  0.4    /  DBili  0.1    /  AST  11     /  ALT  13     /  AlkPhos  65     23 Jan 2020 00:33      CAPILLARY BLOOD GLUCOSE      POCT Blood Glucose.: 152 mg/dL (23 Jan 2020 05:31)  POCT Blood Glucose.: 136 mg/dL (22 Jan 2020 23:05)  POCT Blood Glucose.: 108 mg/dL (22 Jan 2020 17:44)        LIVER FUNCTIONS - ( 23 Jan 2020 00:33 )  Alb: 2.3 g/dL / Pro: 5.3 g/dL / ALK PHOS: 65 U/L / ALT: 13 U/L / AST: 11 U/L / GGT: x               --------------------------------------------------------------------------------------------------  MEDICATIONS  (STANDING):  albuterol/ipratropium for Nebulization. 3 milliLiter(s) Nebulizer every 6 hours  buDESOnide    Inhalation Suspension 0.5 milliGRAM(s) Inhalation every 12 hours  chlorhexidine 2% Cloths 1 Application(s) Topical <User Schedule>  diphenoxylate/atropine 2 Tablet(s) Oral <User Schedule>  enoxaparin Injectable 40 milliGRAM(s) SubCutaneous daily  ergocalciferol 91177 Unit(s) Oral every week  fat emulsion (Fish Oil and Plant Based) 20% Infusion 12.5 mL/Hr (12.5 mL/Hr) IV Continuous <Continuous>  insulin lispro (HumaLOG) corrective regimen sliding scale   SubCutaneous every 6 hours  loperamide 16 milliGRAM(s) Oral <User Schedule>  meropenem  IVPB 1000 milliGRAM(s) IV Intermittent every 8 hours  octreotide  Injectable 100 MICROGram(s) SubCutaneous three times a day  opium Tincture 6 milliGRAM(s) Oral <User Schedule>  pantoprazole    Tablet 40 milliGRAM(s) Oral before breakfast  Parenteral Nutrition - Adult 1 Each (50 mL/Hr) TPN Continuous <Continuous>  psyllium Powder 1 Packet(s) Oral three times a day  sodium chloride 0.9%. 1000 milliLiter(s) (5 mL/Hr) IV Continuous <Continuous>  tamsulosin 0.4 milliGRAM(s) Oral daily    MEDICATIONS  (PRN):  acetaminophen   Tablet .. 975 milliGRAM(s) Oral every 6 hours PRN Mild Pain (1 - 3)  ondansetron Injectable 4 milliGRAM(s) IV Push every 6 hours PRN Nausea and/or Vomiting

## 2020-01-23 NOTE — PROGRESS NOTE ADULT - PROBLEM SELECTOR PLAN 1
s/p 1/10 Left VATS pleurx catheter placement since removed   s/p 1/15 L VATS evacuation of L hemothorax    left base pleural chest  dc'd- CXR-stable  Continue left apical pleural chest tube to water seal   Strict I & O's- monitor drainage from chest tube-poss remove in am  Daily CXR   Increase activity as tolerated, OOB to chair  Continue management as per primary team

## 2020-01-24 LAB
ALBUMIN SERPL ELPH-MCNC: 2 G/DL — LOW (ref 3.3–5)
ALP SERPL-CCNC: 83 U/L — SIGNIFICANT CHANGE UP (ref 40–120)
ALT FLD-CCNC: 23 U/L — SIGNIFICANT CHANGE UP (ref 10–45)
ANION GAP SERPL CALC-SCNC: 4 MMOL/L — LOW (ref 5–17)
AST SERPL-CCNC: 21 U/L — SIGNIFICANT CHANGE UP (ref 10–40)
BILIRUB DIRECT SERPL-MCNC: 0.1 MG/DL — SIGNIFICANT CHANGE UP (ref 0–0.2)
BILIRUB INDIRECT FLD-MCNC: 0.2 MG/DL — SIGNIFICANT CHANGE UP (ref 0.2–1)
BILIRUB SERPL-MCNC: 0.3 MG/DL — SIGNIFICANT CHANGE UP (ref 0.2–1.2)
BUN SERPL-MCNC: 12 MG/DL — SIGNIFICANT CHANGE UP (ref 7–23)
CA-I BLD-SCNC: 1.09 MMOL/L — LOW (ref 1.12–1.3)
CALCIUM SERPL-MCNC: 7.6 MG/DL — LOW (ref 8.4–10.5)
CHLORIDE SERPL-SCNC: 98 MMOL/L — SIGNIFICANT CHANGE UP (ref 96–108)
CO2 SERPL-SCNC: 35 MMOL/L — HIGH (ref 22–31)
CREAT SERPL-MCNC: 0.52 MG/DL — SIGNIFICANT CHANGE UP (ref 0.5–1.3)
GLUCOSE BLDC GLUCOMTR-MCNC: 133 MG/DL — HIGH (ref 70–99)
GLUCOSE BLDC GLUCOMTR-MCNC: 139 MG/DL — HIGH (ref 70–99)
GLUCOSE BLDC GLUCOMTR-MCNC: 158 MG/DL — HIGH (ref 70–99)
GLUCOSE SERPL-MCNC: 145 MG/DL — HIGH (ref 70–99)
HCT VFR BLD CALC: 22.9 % — LOW (ref 39–50)
HGB BLD-MCNC: 7.2 G/DL — LOW (ref 13–17)
MAGNESIUM SERPL-MCNC: 1.9 MG/DL — SIGNIFICANT CHANGE UP (ref 1.6–2.6)
MCHC RBC-ENTMCNC: 31 PG — SIGNIFICANT CHANGE UP (ref 27–34)
MCHC RBC-ENTMCNC: 31.4 GM/DL — LOW (ref 32–36)
MCV RBC AUTO: 98.7 FL — SIGNIFICANT CHANGE UP (ref 80–100)
NRBC # BLD: 0 /100 WBCS — SIGNIFICANT CHANGE UP (ref 0–0)
OSMOLALITY UR: 581 MOS/KG — SIGNIFICANT CHANGE UP (ref 300–900)
PHOSPHATE SERPL-MCNC: 2.3 MG/DL — LOW (ref 2.5–4.5)
PLATELET # BLD AUTO: 293 K/UL — SIGNIFICANT CHANGE UP (ref 150–400)
POTASSIUM SERPL-MCNC: 4.4 MMOL/L — SIGNIFICANT CHANGE UP (ref 3.5–5.3)
POTASSIUM SERPL-SCNC: 4.4 MMOL/L — SIGNIFICANT CHANGE UP (ref 3.5–5.3)
PREALB SERPL-MCNC: 16 MG/DL — LOW (ref 20–40)
PROT SERPL-MCNC: 5.1 G/DL — LOW (ref 6–8.3)
RAPID RVP RESULT: SIGNIFICANT CHANGE UP
RBC # BLD: 2.32 M/UL — LOW (ref 4.2–5.8)
RBC # FLD: 14.7 % — HIGH (ref 10.3–14.5)
SODIUM SERPL-SCNC: 137 MMOL/L — SIGNIFICANT CHANGE UP (ref 135–145)
SODIUM UR-SCNC: 142 MMOL/L — SIGNIFICANT CHANGE UP
TRIGL SERPL-MCNC: 78 MG/DL — SIGNIFICANT CHANGE UP (ref 10–149)
WBC # BLD: 7.78 K/UL — SIGNIFICANT CHANGE UP (ref 3.8–10.5)
WBC # FLD AUTO: 7.78 K/UL — SIGNIFICANT CHANGE UP (ref 3.8–10.5)

## 2020-01-24 PROCEDURE — 99232 SBSQ HOSP IP/OBS MODERATE 35: CPT

## 2020-01-24 PROCEDURE — 99233 SBSQ HOSP IP/OBS HIGH 50: CPT | Mod: GC

## 2020-01-24 PROCEDURE — 71045 X-RAY EXAM CHEST 1 VIEW: CPT | Mod: 26

## 2020-01-24 PROCEDURE — 99024 POSTOP FOLLOW-UP VISIT: CPT

## 2020-01-24 RX ORDER — DESMOPRESSIN ACETATE 0.1 MG/1
2 TABLET ORAL ONCE
Refills: 0 | Status: COMPLETED | OUTPATIENT
Start: 2020-01-24 | End: 2020-01-24

## 2020-01-24 RX ORDER — MAGNESIUM SULFATE 500 MG/ML
2 VIAL (ML) INJECTION ONCE
Refills: 0 | Status: COMPLETED | OUTPATIENT
Start: 2020-01-24 | End: 2020-01-24

## 2020-01-24 RX ORDER — SODIUM CHLORIDE 9 MG/ML
3 INJECTION INTRAMUSCULAR; INTRAVENOUS; SUBCUTANEOUS EVERY 8 HOURS
Refills: 0 | Status: DISCONTINUED | OUTPATIENT
Start: 2020-01-24 | End: 2020-01-25

## 2020-01-24 RX ORDER — I.V. FAT EMULSION 20 G/100ML
12.5 EMULSION INTRAVENOUS
Qty: 30 | Refills: 0 | Status: DISCONTINUED | OUTPATIENT
Start: 2020-01-24 | End: 2020-01-25

## 2020-01-24 RX ORDER — MORPHINE 10 MG/ML
6 SOLUTION ORAL
Refills: 0 | Status: DISCONTINUED | OUTPATIENT
Start: 2020-01-24 | End: 2020-01-31

## 2020-01-24 RX ORDER — DESMOPRESSIN ACETATE 0.1 MG/1
2 TABLET ORAL ONCE
Refills: 0 | Status: DISCONTINUED | OUTPATIENT
Start: 2020-01-24 | End: 2020-01-24

## 2020-01-24 RX ORDER — ELECTROLYTE SOLUTION,INJ
1 VIAL (ML) INTRAVENOUS
Refills: 0 | Status: DISCONTINUED | OUTPATIENT
Start: 2020-01-24 | End: 2020-01-24

## 2020-01-24 RX ORDER — DIPHENOXYLATE HCL/ATROPINE 2.5-.025MG
2 TABLET ORAL
Refills: 0 | Status: DISCONTINUED | OUTPATIENT
Start: 2020-01-24 | End: 2020-01-31

## 2020-01-24 RX ORDER — CALCIUM GLUCONATE 100 MG/ML
2 VIAL (ML) INTRAVENOUS ONCE
Refills: 0 | Status: COMPLETED | OUTPATIENT
Start: 2020-01-24 | End: 2020-01-24

## 2020-01-24 RX ADMIN — Medication 3 MILLILITER(S): at 11:12

## 2020-01-24 RX ADMIN — ENOXAPARIN SODIUM 40 MILLIGRAM(S): 100 INJECTION SUBCUTANEOUS at 11:10

## 2020-01-24 RX ADMIN — Medication 2: at 11:10

## 2020-01-24 RX ADMIN — MORPHINE 6 MILLIGRAM(S): 10 SOLUTION ORAL at 22:17

## 2020-01-24 RX ADMIN — Medication 50 GRAM(S): at 01:02

## 2020-01-24 RX ADMIN — I.V. FAT EMULSION 12.5 ML/HR: 20 EMULSION INTRAVENOUS at 17:01

## 2020-01-24 RX ADMIN — Medication 1 PACKET(S): at 06:14

## 2020-01-24 RX ADMIN — SODIUM CHLORIDE 3 GRAM(S): 9 INJECTION INTRAMUSCULAR; INTRAVENOUS; SUBCUTANEOUS at 21:11

## 2020-01-24 RX ADMIN — MORPHINE 6 MILLIGRAM(S): 10 SOLUTION ORAL at 08:54

## 2020-01-24 RX ADMIN — Medication 2 TABLET(S): at 08:33

## 2020-01-24 RX ADMIN — MEROPENEM 100 MILLIGRAM(S): 1 INJECTION INTRAVENOUS at 13:03

## 2020-01-24 RX ADMIN — DESMOPRESSIN ACETATE 2 MICROGRAM(S): 0.1 TABLET ORAL at 11:09

## 2020-01-24 RX ADMIN — Medication 62.5 MILLIMOLE(S): at 03:14

## 2020-01-24 RX ADMIN — MEROPENEM 100 MILLIGRAM(S): 1 INJECTION INTRAVENOUS at 06:14

## 2020-01-24 RX ADMIN — Medication 16 MILLIGRAM(S): at 22:18

## 2020-01-24 RX ADMIN — Medication 2 TABLET(S): at 13:04

## 2020-01-24 RX ADMIN — MORPHINE 6 MILLIGRAM(S): 10 SOLUTION ORAL at 17:00

## 2020-01-24 RX ADMIN — Medication 2 TABLET(S): at 22:17

## 2020-01-24 RX ADMIN — Medication 3 MILLILITER(S): at 05:48

## 2020-01-24 RX ADMIN — CHLORHEXIDINE GLUCONATE 1 APPLICATION(S): 213 SOLUTION TOPICAL at 06:14

## 2020-01-24 RX ADMIN — Medication 3 MILLILITER(S): at 17:35

## 2020-01-24 RX ADMIN — OCTREOTIDE ACETATE 100 MICROGRAM(S): 200 INJECTION, SOLUTION INTRAVENOUS; SUBCUTANEOUS at 13:03

## 2020-01-24 RX ADMIN — Medication 1 PACKET(S): at 21:08

## 2020-01-24 RX ADMIN — MORPHINE 6 MILLIGRAM(S): 10 SOLUTION ORAL at 13:04

## 2020-01-24 RX ADMIN — TAMSULOSIN HYDROCHLORIDE 0.4 MILLIGRAM(S): 0.4 CAPSULE ORAL at 11:10

## 2020-01-24 RX ADMIN — Medication 200 GRAM(S): at 08:32

## 2020-01-24 RX ADMIN — Medication 16 MILLIGRAM(S): at 13:03

## 2020-01-24 RX ADMIN — OCTREOTIDE ACETATE 100 MICROGRAM(S): 200 INJECTION, SOLUTION INTRAVENOUS; SUBCUTANEOUS at 21:08

## 2020-01-24 RX ADMIN — Medication 1 PACKET(S): at 13:43

## 2020-01-24 RX ADMIN — Medication 16 MILLIGRAM(S): at 08:33

## 2020-01-24 RX ADMIN — Medication 2 TABLET(S): at 17:01

## 2020-01-24 RX ADMIN — Medication 1 EACH: at 17:01

## 2020-01-24 RX ADMIN — SODIUM CHLORIDE 3 GRAM(S): 9 INJECTION INTRAMUSCULAR; INTRAVENOUS; SUBCUTANEOUS at 13:04

## 2020-01-24 RX ADMIN — OCTREOTIDE ACETATE 100 MICROGRAM(S): 200 INJECTION, SOLUTION INTRAVENOUS; SUBCUTANEOUS at 06:14

## 2020-01-24 RX ADMIN — PANTOPRAZOLE SODIUM 40 MILLIGRAM(S): 20 TABLET, DELAYED RELEASE ORAL at 08:34

## 2020-01-24 RX ADMIN — MEROPENEM 100 MILLIGRAM(S): 1 INJECTION INTRAVENOUS at 21:07

## 2020-01-24 RX ADMIN — Medication 0.5 MILLIGRAM(S): at 17:35

## 2020-01-24 RX ADMIN — Medication 0.5 MILLIGRAM(S): at 05:47

## 2020-01-24 RX ADMIN — Medication 16 MILLIGRAM(S): at 17:00

## 2020-01-24 NOTE — PROGRESS NOTE ADULT - SUBJECTIVE AND OBJECTIVE BOX
HISTORY  75y Male past medical history of COPD and EtOH dependence who presented on 12/6/2019 with abdominal pain, nausea, hematemesis, and poor PO intake for ~2-3 days. In the ED, he was found to be hypotensive & tachycardic. Labs revealed an CHI and lactic acidosis. Imaging revealed a high grade SBO in the RLQ on CT scan. Hospital course is as follows:  12/06 - s/p exploratory laparotomy, lysis of adhesions, decompression of bowel via enterotomy w/ primary repair, and Abthera VAC placement as the distal 50% of the bowel appeared dusky but was still viable, admitted to SICU post-operatively as he was on vasopressor support and was left intubated  12/08 - s/p re-exploration, proximal 165 cm of small bowel appeared pink & viable but beyond that had patchy areas of ischemia so decision was made to give the bowel more time to demarcate before resecting in order to preserve as much small bowel as possible so Abthera VAC was replaced  12/09 - s/p re-exploration, small bowel resection of 150 cm (150 cm remaining), ileocecetomy, end ileostomy, mucous fistula, and abdominal closure  12/11 - extubated to BiPAP, noted to be in SVT that was rate controlled w/ metoprolol  12/14 - started on TPN  12/15 - noted to have spontaneous left pneumothorax s/p left pigtail catheter placement, noted to be in SVT again so amiodarone started, high ileostomy output noted so concern for short bowel syndrome  12/16 - amiodarone discontinued, started on beta blocker with metoprolol  12/18 - started Lomotil and ileostomy repletions with IV fluids  12/19 - started Imodium, left pigtail catheter was placed to water seal but pneumothorax noted on follow-up CXR so placed back to suction  12/20 - started tincture of opium, concern for aspiration so changed diet from regular to dysphagia 1 pureed with nectar-thickened liquids  12/22 - left pigtail catheter placed to water seal with no pneumothorax noted on follow-up CXR, CT chest with moderate left pleural effusion not being drained by pigtail catheter  12/23 - 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  12/24 - left pigtail catheter discontinued  12/28 - started Ancef for erythematous midline wound  12/29 - beta blocker discontinued for intermittent episodes of hypotension  12/30 - left pigtail catheter placement by IR with drainage of serous transudative fluid  01/02 - FEES demonstrating penetration with thin liquids so patient kept on dysphagia 1 pureed with nectar-thickened liquids  01/07 - Transferred to floors  01/10 - s/p left VATS w/ PleurX catheter placement  01/11 - evaluated by speech & swallow, advanced diet to mechanical soft with thin liquids  01/12 - PleurX catheter placed to water seal with on pneumothorax on follow-up CXR  01/13 - RRT for hypoxemia, placed on BiPAP, PleurX catheter placed back to suction, started vancomycin & Zosyn for possible HCAP  01/14 - noted to have minimal output from PleurX catheter, lung ultrasound with large left pleural effusion concerning for hemothorax, multiple episodes of bradycardia secondary to hypoxemia, remains on BiPAP  01/15 - worsening respiratory distress requiring intubation, hypotensive requiring vasopressor support, CT chest with large left hemothorax w/ trace pneumothorax & extensive subcutaneous emphysema so taken to OR emergently for left VATS, evacuation fo 2 L of clot, and placement of two left chest tubes  01/16 - extubated, weaned off vasopressor support  01/17 - two left chest tubes placed to water seal with no pneumothorax noted on follow-up CXR      24 HOUR EVENTS:   - RVP sent due to lethargy to rule out the flu.   - Urine output remains high   - No overnight events      SUBJECTIVE/ROS:  [ ] A ten-point review of systems was otherwise negative except as noted.  [ ] Due to altered mental status/intubation, subjective information were not able to be obtained from the patient. History was obtained, to the extent possible, from review of the chart and collateral sources of information.      NEURO  Exam: awake, alert, oriented  Meds: acetaminophen   Tablet .. 975 milliGRAM(s) Oral every 6 hours PRN Mild Pain (1 - 3)  ondansetron Injectable 4 milliGRAM(s) IV Push every 6 hours PRN Nausea and/or Vomiting  opium Tincture 6 milliGRAM(s) Oral <User Schedule>    [x] Adequacy of sedation and pain control has been assessed and adjusted      RESPIRATORY  RR: 27 (01-24-20 @ 01:00) (15 - 42)  SpO2: 100% (01-24-20 @ 01:00) (90% - 100%)  Exam: unlabored, clear to auscultation bilaterally  Mechanical Ventilation: none  [N/A] Extubation Readiness Assessed  Meds: albuterol/ipratropium for Nebulization. 3 milliLiter(s) Nebulizer every 6 hours  buDESOnide    Inhalation Suspension 0.5 milliGRAM(s) Inhalation every 12 hours        CARDIOVASCULAR  HR: 98 (01-24-20 @ 01:00) (77 - 109)  BP: 130/61 (01-24-20 @ 01:00) (90/52 - 166/75)  BP(mean): 88 (01-24-20 @ 01:00) (65 - 108)  Exam: regular rate and rhythm  Cardiac Rhythm: sinus  Perfusion     [x]Adequate   [ ]Inadequate  Mentation   [x]Normal       [ ]Reduced  Extremities  [x]Warm         [ ]Cool  Volume Status [ ]Hypervolemic [x]Euvolemic [ ]Hypovolemic  Meds: tamsulosin 0.4 milliGRAM(s) Oral daily        GI/NUTRITION  Exam: soft, nontender, nondistended  Diet: mechanical soft   Meds: diphenoxylate/atropine 2 Tablet(s) Oral <User Schedule>  loperamide 16 milliGRAM(s) Oral <User Schedule>  pantoprazole    Tablet 40 milliGRAM(s) Oral before breakfast  psyllium Powder 1 Packet(s) Oral three times a day      GENITOURINARY  I&O's Detail    01-22 @ 07:01  -  01-23 @ 07:00  --------------------------------------------------------  IN:    fat emulsion (Fish Oil and Plant Based) 20% Infusion: 150 mL    Oral Fluid: 660 mL    sodium chloride 0.9%.: 650 mL    Solution: 250 mL    Solution: 400 mL    TPN (Total Parenteral Nutrition): 1200 mL  Total IN: 3310 mL    OUT:    Chest Tube: 50 mL    Chest Tube: 80 mL    Ileostomy: 2600 mL    Indwelling Catheter - Urethral: 3850 mL  Total OUT: 6580 mL    Total NET: -3270 mL      01-23 @ 07:01  -  01-24 @ 01:22  --------------------------------------------------------  IN:    fat emulsion (Fish Oil and Plant Based) 20% Infusion: 112.5 mL    Oral Fluid: 787 mL    sodium chloride 0.9%.: 325 mL    Solution: 150 mL    TPN (Total Parenteral Nutrition): 900 mL  Total IN: 2274.5 mL    OUT:    Chest Tube: 60 mL    Ileostomy: 1300 mL    Indwelling Catheter - Urethral: 3335 mL  Total OUT: 4695 mL    Total NET: -2420.5 mL          01-24    137  |  98  |  12  ----------------------------<  145<H>  4.4   |  35<H>  |  0.52    Ca    7.6<L>      24 Jan 2020 00:18  Phos  2.3     01-24  Mg     1.9     01-24    TPro  5.1<L>  /  Alb  2.0<L>  /  TBili  0.3  /  DBili  0.1  /  AST  21  /  ALT  23  /  AlkPhos  83  01-24    [x] Martinez catheter, indication: urinary retention   Meds: ergocalciferol 23552 Unit(s) Oral every week  fat emulsion (Fish Oil and Plant Based) 20% Infusion 12.5 mL/Hr IV Continuous <Continuous>  Parenteral Nutrition - Adult 1 Each TPN Continuous <Continuous>  sodium chloride 0.9%. 1000 milliLiter(s) IV Continuous <Continuous>        HEMATOLOGIC  Meds: enoxaparin Injectable 40 milliGRAM(s) SubCutaneous daily    [x] VTE Prophylaxis                        7.2    7.78  )-----------( 293      ( 24 Jan 2020 00:18 )             22.9       Transfusion     [ ] PRBC   [ ] Platelets   [ ] FFP   [ ] Cryoprecipitate      INFECTIOUS DISEASES  WBC Count: 7.78 K/uL (01-24 @ 00:18)    RECENT CULTURES: none    Meds: meropenem  IVPB 1000 milliGRAM(s) IV Intermittent every 8 hours        ENDOCRINE  CAPILLARY BLOOD GLUCOSE      POCT Blood Glucose.: 135 mg/dL (23 Jan 2020 23:04)  POCT Blood Glucose.: 188 mg/dL (23 Jan 2020 17:15)  POCT Blood Glucose.: 137 mg/dL (23 Jan 2020 12:30)  POCT Blood Glucose.: 152 mg/dL (23 Jan 2020 05:31)    Meds: insulin lispro (HumaLOG) corrective regimen sliding scale   SubCutaneous every 6 hours  octreotide  Injectable 100 MICROGram(s) SubCutaneous three times a day        ACCESS DEVICES:  [x] Peripheral IV  [ ] Central Venous Line	[ ] R	[ ] L	[ ] IJ	[ ] Fem	[ ] SC	Placed:   [ ] Arterial Line		[ ] R	[ ] L	[ ] Fem	[ ] Rad	[ ] Ax	Placed:   [ ] PICC:					[ ] Mediport  [ ] Urinary Catheter, Date Placed:   [x] Necessity of urinary, arterial, and venous catheters discussed    OTHER MEDICATIONS:  chlorhexidine 2% Cloths 1 Application(s) Topical <User Schedule>      CODE STATUS: full code       IMAGING: < from: CT Abdomen and Pelvis w/ IV Cont (01.15.20 @ 13:25) >  IMPRESSION:     Loculated large left pleural effusion with areas of hyperattenuation likely secondary to hematoma. Interval increase in size of the loculated left pleural effusion since 12/30/2019. Left sided chest tube terminating in the pleural space. Trace left pneumothorax.     Extensive left subcutaneous emphysema. Hematoma is also noted along the left lateral chest wall.    Interval resolution of the midline anterior abdominal wall fluid collection.    Markedly distended urinary bladder with mild bilateral hydronephrosis.    A small droplet of gas in the right anterior abdomen adjacent to the ileostomy may be extraluminal. Repeat CT with oral contrast may be helpful for further evaluation.      < end of copied text >

## 2020-01-24 NOTE — PROGRESS NOTE ADULT - ASSESSMENT
73 y/o male presenting with high grade SBO s/p exploratory laparotomy, lysis of adhesions, decompression of bowel via enterotomy w/ primary repair, & Abthera VAC placement (12/06/2019); RTOR for re-exploration w/ Abthera VAC replacement (12/08/2019) to allow for demarcation of ischemic small bowel; RTOR for re-exploration, small bowel resection of 150 cm (150 cm remaining), ileocecetomy, end ileostomy, mucous fistula, and abdominal closure (12/10/2019); hospital course complicated by SVT, short bowel syndrome requiring repletions w/ IV fluids, malnutrition requiring TPN, intermittent episodes of hypotension, spontaneous left pneumothorax s/p pigtail catheter (12/15/2019-12/24/2019), left pleural effusion s/p pigtail catheter w/ IR (12/30/2019), dysphagia, and oral HSV lesions, placement of Pleurx on 1/10, now re-admitted to SICU with acute hypoxic respiratory failure s/p repeat VATS with 2 liter hemothorax removed. Respiratory failure resolved, now stable in SICU.    Neuro: no acute issues  - Pain control as needed with acetaminophen    Resp: COPD, spontaneous left pneumothorax s/p pigtail catheter, left pleural effusion s/p Pleurx 1/10.  Intubated for respiratory distress s/p VATS 01/15 with 2 Liter of blood evacuation   - Cont chest tube x 1 to waterseal  - Pulmicort and Duoneb for COPD  - Will need outpatient follow-up with a pulmonologist for his COPD as patient does not currently have one  - Follow up CXR    CV: Bradycardia, SVT.  - No interval dysrhythmias   - Monitor vital signs    GI: s/p exploratory laparotomy, [place], decompression of bowel via enterotomy w/ primary repair, & Abthera VAC placement (12/06/2019); re-exploration w/ ABthera VAC replacement (12/08/2019); re-exploration, small bowel resection of 150 cm (150 cm remaining), ileocecectomy end ileostomy, mucous fistula, and abdominal closure (12/10/2019); short bowel syndrome, malnutrition, dysphagia  - Dysphagia diet   - Monitor ostomy output 1.8L past 24hr  - Continue Lomotil, tincture of opium, loperamide, and octreotide for short bowel syndrome  - Protonix to decrease gastric secretions  - Continue 0.25/1 ostomy repletions    Renal: no acute issues  - Monitor I&Os  - Monitor electrolytes and replete as necessary  - rosas catheter in place    Heme: anemia likely secondary to malnutrition / malabsorption  - Monitor CBC and coags  - Q12 CBCs   - Lovenox for VTE prophylaxis    ID: oral HSV lesions (s/p acyclovir 12/23/2019-01/02/2020)  - Combicath 1/16 with pseudomonas resistant to Zosyn  - Blood cx 1/16 NGTD.   - Meropenem    Endo: no acute issues  - Monitor glucose before meals & at bedtime while on TPN  - Insulin sliding scale.    Disposition:  - Will remain in SICU

## 2020-01-24 NOTE — PROGRESS NOTE ADULT - ATTENDING COMMENTS
Dr. Carroll (Attending Physician)  COPD on duonebs and pulmicort  Hemothorax 1 chest tube remains, only 110 out  Polyuria of unclear etiology nephrogenic vs central DI, will give dose of ddavp today  TPN for malnourishment, high output ostomy not absorbing well on maximal antimotility  Pseudomonas pneumonia meropenem to stop 1/26

## 2020-01-24 NOTE — PROGRESS NOTE ADULT - ASSESSMENT
A/P: 75 year old male w/ PMH of COPD and EtOH dependence with PSH/o appy, prostate surgery, & spine surgery  septic shock and high grade SBO s/p exploratory laparotomy, lysis of adhesions, decompression of bowel via enterotomy w/ primary repair, and Abthera VAC placement on 12/6; s/p take down of Abthera, washout and re-application of the Abthera vac on 12/8. Now s/p SBR and end ileostomy on 12/10.   TPN consulted to assist w/ management of pt's nutrition in pt w/ prolonged hospital course now tolerating diet but has high Ileostomy output.  Pt s/p Lt Chest Pigtail for effusion in IR with persistent high output on 1/10/2020 pt had VATs & placement of Left PleurX catheter. Pt op pt developed hemothorax and Respiratory Distress.  Pt is s/p Lt chest Evacuation of 2L blood w/ placement of CT x2 on 1/15/2020 with persistent Lt Pleural Effusion.    Pt remains in SICU w/ Persistent Lt Pleural Effusion, Rt Pleural Effusion vs PNA, and High Ostomy Output        Pt remains with one Lt CT to water seal  Plan to Reverse Ileostomy in February (tentative for 8wks post original  surgery)  Pt w/ improving severe Protein-Calorie Malnutrition-         TPN restarted at 1/2 Goal- pt eating sufficiently to make up approximately 1/2 of nutritional goals            Amino Acids 60g, Dextrose 140g, and Lipids 30g in 1200mL with 3mL MTE & 10mL MVI        Pt tolerating Soft Mechanical w/ Ensure & Prosource supplements, consider adding MTC oil 15mL              Calorie Count to finish             High Ostomy Output and Chest Tube output w/ suspected increased protein losses  HypoCa- increased Ca in TPN to 12mEq- continue to monitor  HypoPhos- improving w/ NaPhos 45mMol in TPN & will continue to monitor   Fecal fat testing suggestive of decreased absorption of fat           Supplements added to compensate for losses    Vit D levels low- Ergocalciferol supplements started    Vit A low- consider Vit A    Vit K INR/ PT near normal suggestive that it is being absorbed    VIt E Alpha Tocopherol- normal & beta-gamma-Tocopherol - low - consider VIt E  High Urine Output- low probability of DI as Na normal, pt has also been diuresed and fluid restricted          Consider NaCl tabs 3g TID PO   Strict Intake and Output -             Increased ostomy output- diet advanced -replete as per ICU (NS IVF 0.25:1 IVF q6h)            Continue to monitor while on octreotide, lomotil, tincture of opium, & imodium  BLE Edema- will continue to monitor - decreased Na & volume in TPN given  Leukocytosis- improving - Meropenum as per ID  Hyperglycemia-improving      Fingersticks & ISS coverage - as per SICU  To continue monitoring of nutrition - Weights three times a week; Daily CMP, Mg, Ionized Ca, Phosphorus,        and Weekly Triglycerides and Pre-albumin  Continue as per SICU/Surgery, will follow with you, D/w primary team    Andreina Hubbard PA-C  TPN team, pager [phone]  D/w Dr Chacko A/P: 75 year old male w/ PMH of COPD and EtOH dependence with PSH/o appy, prostate surgery, & spine surgery  septic shock and high grade SBO s/p exploratory laparotomy, lysis of adhesions, decompression of bowel via enterotomy w/ primary repair, and Abthera VAC placement on 12/6; s/p take down of Abthera, washout and re-application of the Abthera vac on 12/8. Now s/p SBR and end ileostomy on 12/10.   TPN consulted to assist w/ management of pt's nutrition in pt w/ prolonged hospital course now tolerating diet but has high Ileostomy output.  Pt s/p Lt Chest Pigtail for effusion in IR with persistent high output on 1/10/2020 pt had VATs & placement of Left PleurX catheter. Pt op pt developed hemothorax and Respiratory Distress.  Pt is s/p Lt chest Evacuation of 2L blood w/ placement of CT x2 on 1/15/2020 with persistent Lt Pleural Effusion.    Pt remains in SICU w/ Persistent Lt Pleural Effusion, Rt Pleural Effusion vs PNA, and High Ostomy Output        Pt remains with one Lt CT to water seal  Plan to Reverse Ileostomy in February (tentative for 8wks post original  surgery)  Pt w/ improving severe Protein-Calorie Malnutrition-         TPN restarted at 1/2 Goal- pt eating sufficiently to make up approximately 1/2 of nutritional goals            Amino Acids 60g, Dextrose 140g, and Lipids 30g in 1200mL with 3mL MTE & 10mL MVI        Pt tolerating Soft Mechanical w/ Ensure & Prosource supplements, consider adding MTC oil 15mL              Calorie Count to finish             High Ostomy Output and Chest Tube output w/ suspected increased protein losses  HypoCa- increased Ca in TPN to 12mEq- continue to monitor  HypoPhos- improving w/ NaPhos 45mMol in TPN & will continue to monitor   Fecal fat testing suggestive of decreased absorption of fat           Supplements added to compensate for losses    Vit D levels low- Ergocalciferol supplements started    Vit A low- consider Vit A    Vit K INR/ PT near normal suggestive that it is being absorbed    VIt E Alpha Tocopherol- normal & beta-gamma-Tocopherol - low - consider VIt E  High Urine Output- low probability of DI as Na normal, pt has also been diuresed and fluid restricted          Consider NaCl tabs 3g TID PO & stop ostomy fluid repletion  Strict Intake and Output -             Increased ostomy output-            Continue to monitor while on octreotide, lomotil, tincture of opium, & imodium  BLE Edema- will continue to monitor - decreased Na & volume in TPN given  Leukocytosis- improving - Meropenum as per ID  Hyperglycemia-improving      Fingersticks & ISS coverage - as per SICU  To continue monitoring of nutrition - Weights three times a week; Daily CMP, Mg, Ionized Ca, Phosphorus,        and Weekly Triglycerides and Pre-albumin  Continue as per SICU/Surgery, will follow with you, D/w primary team    Andreina Hubbard PA-C  TPN team, pager [phone]  D/w Dr Chacko

## 2020-01-24 NOTE — PROGRESS NOTE ADULT - SUBJECTIVE AND OBJECTIVE BOX
[facility] NUTRITION SUPPORT / TPN -- FOLLOW UP NOTE  --------------------------------------------------------------------------------    24 hour events/subjective:  Pt feeling good today- eating breakfast  Ate better yesterday  No cough/ cp/ palp/dyspnea/ sob  No f/c/s  no N/V  Concern for DI- DDAVP trial- pt with increasing urine output  High ostomy output w/ 2500mL over 24 hours             replacement with 0.25 cc per cc output with NS            continue lomotil, imodium, octreotide, tincture of opium at max doses  Improving Leukocytosis on meropenum       Diet:  Diet, Mechanical Soft:   No Carb Prosource (1pkg = 15gms Protein)     Qty per Day:  2  MCT Oil (HNT Only)     Qty per Day:  15mL  Supplement Feeding Modality:  Oral  Ensure Enlive Cans or Servings Per Day:  2       Frequency:  Daily  Promote Cans or Servings Per Day:  2       Frequency:  Daily (01-17-20 @ 12:44)      Appetite: [  ]Poor [x  ]Adequate [  ]Good  Caloric intake:  [  x ]  Adequate   [   ] Inadequate    ROS: General/ GI see HPI  all other systems negative      ALLERGIES & MEDICATIONS  --------------------------------------------------------------------------------  ALLERGIES  IV Contrast (Hives)    STANDING INPATIENT MEDICATIONS    albuterol/ipratropium for Nebulization. 3 milliLiter(s) Nebulizer every 6 hours  buDESOnide    Inhalation Suspension 0.5 milliGRAM(s) Inhalation every 12 hours  chlorhexidine 2% Cloths 1 Application(s) Topical <User Schedule>  desmopressin Injectable 2 MICROGram(s) IV Push once  diphenoxylate/atropine 2 Tablet(s) Oral <User Schedule>  enoxaparin Injectable 40 milliGRAM(s) SubCutaneous daily  ergocalciferol 97396 Unit(s) Oral every week  fat emulsion (Fish Oil and Plant Based) 20% Infusion 12.5 mL/Hr IV Continuous <Continuous>  insulin lispro (HumaLOG) corrective regimen sliding scale   SubCutaneous every 6 hours  loperamide 16 milliGRAM(s) Oral <User Schedule>  meropenem  IVPB 1000 milliGRAM(s) IV Intermittent every 8 hours  octreotide  Injectable 100 MICROGram(s) SubCutaneous three times a day  opium Tincture 6 milliGRAM(s) Oral <User Schedule>  pantoprazole    Tablet 40 milliGRAM(s) Oral before breakfast  Parenteral Nutrition - Adult 1 Each TPN Continuous <Continuous>  psyllium Powder 1 Packet(s) Oral three times a day  sodium chloride 0.9%. 1000 milliLiter(s) IV Continuous <Continuous>  tamsulosin 0.4 milliGRAM(s) Oral daily      PRN INPATIENT MEDICATION  acetaminophen   Tablet .. 975 milliGRAM(s) Oral every 6 hours PRN  ondansetron Injectable 4 milliGRAM(s) IV Push every 6 hours PRN        VITALS/PHYSICAL EXAM  --------------------------------------------------------------------------------  T(C): 36.9 (01-24-20 @ 07:00), Max: 37.7 (01-23-20 @ 23:00)  HR: 93 (01-24-20 @ 09:00) (75 - 109)  BP: 169/79 (01-24-20 @ 09:00) (95/50 - 169/79)  RR: 32 (01-24-20 @ 09:00) (3 - 42)  SpO2: 90% (01-24-20 @ 09:00) (90% - 100%)  Wt(kg): --        01-23-20 @ 07:01  -  01-24-20 @ 07:00  --------------------------------------------------------  IN: 3424.5 mL / OUT: 6795 mL / NET: -3370.5 mL    01-24-20 @ 07:01  -  01-24-20 @ 10:10  --------------------------------------------------------  IN: 680 mL / OUT: 550 mL / NET: 130 mL      PHYSICAL EXAM:  --------------------------------------------------------------------------------  	Gen: guarded but stable, A&Ox3, NC O2  	HEENT: NC/AT, PERRL, supple neck, trachea midline, mucosa moist              Chest:  decreased BS Lt base; Lt chest w/ CT x1 to water seal  	GI: (+) BS, softly distended, non tender                    midline incision c/d/i w/o drainage                   (+)ostomy pink viable- thick liquidy stool              MSK: FROM x4, no contractures nor deformities  	Vascular: Equally Warm, (+)BLE mild edema,  no clubbing, cyanosis,                       BUE w/o edema, clubbing & cyanosis                       RUE PICC w/o sx infection   	Neuro: No focal deficits, intact sensation, weakened strength BLE>BUE  	Psych: Normal affect and mood      LABS/ CULTURES/ RADIOLOGY:              7.2    7.78  >-----------<  293      [01-24-20 @ 00:18]              22.9     137  |  98  |  12  ----------------------------<  145      [01-24-20 @ 00:18]  4.4   |  35  |  0.52        Ca     7.6     [01-24-20 @ 00:18]      iCa    1.09     [01-24 @ 00:29]      Mg     1.9     [01-24-20 @ 00:18]      Phos  2.3     [01-24-20 @ 00:18]    TPro  5.1  /  Alb  2.0  /  TBili  0.3  /  DBili  0.1  /  AST  21  /  ALT  23  /  AlkPhos  83  [01-24-20 @ 00:18]    Serum Osmolality 289      [01-22-20 @ 11:53]      CAPILLARY BLOOD GLUCOSE  POCT Blood Glucose.: 133 mg/dL (24 Jan 2020 06:12)  POCT Blood Glucose.: 135 mg/dL (23 Jan 2020 23:04)  POCT Blood Glucose.: 188 mg/dL (23 Jan 2020 17:15)  POCT Blood Glucose.: 137 mg/dL (23 Jan 2020 12:30)    Prealbumin, Serum: 16 mg/dL (01-24-20 @ 02:53)  Prealbumin, Serum: 13 mg/dL (01-21-20 @ 02:00)  Prealbumin, Serum: 15 mg/dL (01-15-20 @ 09:01)  Prealbumin, Serum: 13 mg/dL (01-14-20 @ 07:29)  Prealbumin, Serum: 13 mg/dL (01-07-20 @ 02:35)      Triglycerides, Serum: 78 mg/dL (01.24.20 @ 00:18)  Triglycerides, Serum: 76 mg/dL (01.21.20 @ 00:26)  Triglycerides, Serum: 51 mg/dL (01.15.20 @ 01:44)  Triglycerides, Serum: 60 mg/dL (01.14.20 @ 03:31) [facility] NUTRITION SUPPORT / TPN -- FOLLOW UP NOTE  --------------------------------------------------------------------------------    24 hour events/subjective:  Pt feeling good today- eating breakfast  Ate better yesterday  No cough/ cp/ palp/dyspnea/ sob  No f/c/s  no N/V  Concern for DI- DDAVP trial- pt with increasing urine output, but Na is normal            pt lost about 20lbs- some 2/2 to diuresis            24hr Urine noted   High ostomy output w/ 2500mL over 24 hours             replacement with 0.25 cc per cc output with NS            continue lomotil, imodium, octreotide, tincture of opium at max doses  Improving Leukocytosis on meropenum       Diet:  Diet, Mechanical Soft:   No Carb Prosource (1pkg = 15gms Protein)     Qty per Day:  2  MCT Oil (HNT Only)     Qty per Day:  15mL  Supplement Feeding Modality:  Oral  Ensure Enlive Cans or Servings Per Day:  2       Frequency:  Daily  Promote Cans or Servings Per Day:  2       Frequency:  Daily (01-17-20 @ 12:44)      Appetite: [  ]Poor [x  ]Adequate [  ]Good  Caloric intake:  [  x ]  Adequate   [   ] Inadequate    ROS: General/ GI see HPI  all other systems negative      ALLERGIES & MEDICATIONS  --------------------------------------------------------------------------------  ALLERGIES  IV Contrast (Hives)    STANDING INPATIENT MEDICATIONS    albuterol/ipratropium for Nebulization. 3 milliLiter(s) Nebulizer every 6 hours  buDESOnide    Inhalation Suspension 0.5 milliGRAM(s) Inhalation every 12 hours  chlorhexidine 2% Cloths 1 Application(s) Topical <User Schedule>  desmopressin Injectable 2 MICROGram(s) IV Push once  diphenoxylate/atropine 2 Tablet(s) Oral <User Schedule>  enoxaparin Injectable 40 milliGRAM(s) SubCutaneous daily  ergocalciferol 17544 Unit(s) Oral every week  fat emulsion (Fish Oil and Plant Based) 20% Infusion 12.5 mL/Hr IV Continuous <Continuous>  insulin lispro (HumaLOG) corrective regimen sliding scale   SubCutaneous every 6 hours  loperamide 16 milliGRAM(s) Oral <User Schedule>  meropenem  IVPB 1000 milliGRAM(s) IV Intermittent every 8 hours  octreotide  Injectable 100 MICROGram(s) SubCutaneous three times a day  opium Tincture 6 milliGRAM(s) Oral <User Schedule>  pantoprazole    Tablet 40 milliGRAM(s) Oral before breakfast  Parenteral Nutrition - Adult 1 Each TPN Continuous <Continuous>  psyllium Powder 1 Packet(s) Oral three times a day  sodium chloride 0.9%. 1000 milliLiter(s) IV Continuous <Continuous>  tamsulosin 0.4 milliGRAM(s) Oral daily      PRN INPATIENT MEDICATION  acetaminophen   Tablet .. 975 milliGRAM(s) Oral every 6 hours PRN  ondansetron Injectable 4 milliGRAM(s) IV Push every 6 hours PRN        VITALS/PHYSICAL EXAM  --------------------------------------------------------------------------------  T(C): 36.9 (01-24-20 @ 07:00), Max: 37.7 (01-23-20 @ 23:00)  HR: 93 (01-24-20 @ 09:00) (75 - 109)  BP: 169/79 (01-24-20 @ 09:00) (95/50 - 169/79)  RR: 32 (01-24-20 @ 09:00) (3 - 42)  SpO2: 90% (01-24-20 @ 09:00) (90% - 100%)  Wt(kg): --        01-23-20 @ 07:01  -  01-24-20 @ 07:00  --------------------------------------------------------  IN: 3424.5 mL / OUT: 6795 mL / NET: -3370.5 mL    01-24-20 @ 07:01 - 01-24-20 @ 10:10  --------------------------------------------------------  IN: 680 mL / OUT: 550 mL / NET: 130 mL      PHYSICAL EXAM:  --------------------------------------------------------------------------------  	Gen: guarded but stable, A&Ox3, NC O2  	HEENT: NC/AT, PERRL, supple neck, trachea midline, mucosa moist              Chest:  decreased BS Lt base; Lt chest w/ CT x1 to water seal  	GI: (+) BS, softly distended, non tender                    midline incision c/d/i w/o drainage                   (+)ostomy pink viable- thick liquidy stool              MSK: FROM x4, no contractures nor deformities  	Vascular: Equally Warm, (+)BLE mild edema,  no clubbing, cyanosis,                       BUE w/o edema, clubbing & cyanosis                       RUE PICC w/o sx infection   	Neuro: No focal deficits, intact sensation, weakened strength BLE>BUE  	Psych: Normal affect and mood      LABS/ CULTURES/ RADIOLOGY:              7.2    7.78  >-----------<  293      [01-24-20 @ 00:18]              22.9     137  |  98  |  12  ----------------------------<  145      [01-24-20 @ 00:18]  4.4   |  35  |  0.52        Ca     7.6     [01-24-20 @ 00:18]      iCa    1.09     [01-24 @ 00:29]      Mg     1.9     [01-24-20 @ 00:18]      Phos  2.3     [01-24-20 @ 00:18]    TPro  5.1  /  Alb  2.0  /  TBili  0.3  /  DBili  0.1  /  AST  21  /  ALT  23  /  AlkPhos  83  [01-24-20 @ 00:18]    Serum Osmolality 289      [01-22-20 @ 11:53]      CAPILLARY BLOOD GLUCOSE  POCT Blood Glucose.: 133 mg/dL (24 Jan 2020 06:12)  POCT Blood Glucose.: 135 mg/dL (23 Jan 2020 23:04)  POCT Blood Glucose.: 188 mg/dL (23 Jan 2020 17:15)  POCT Blood Glucose.: 137 mg/dL (23 Jan 2020 12:30)    Prealbumin, Serum: 16 mg/dL (01-24-20 @ 02:53)  Prealbumin, Serum: 13 mg/dL (01-21-20 @ 02:00)  Prealbumin, Serum: 15 mg/dL (01-15-20 @ 09:01)  Prealbumin, Serum: 13 mg/dL (01-14-20 @ 07:29)  Prealbumin, Serum: 13 mg/dL (01-07-20 @ 02:35)      Triglycerides, Serum: 78 mg/dL (01.24.20 @ 00:18)  Triglycerides, Serum: 76 mg/dL (01.21.20 @ 00:26)  Triglycerides, Serum: 51 mg/dL (01.15.20 @ 01:44)  Triglycerides, Serum: 60 mg/dL (01.14.20 @ 03:31)

## 2020-01-24 NOTE — PROGRESS NOTE ADULT - PROBLEM SELECTOR PLAN 1
s/p 1/10 Left VATS pleurx catheter placement since removed   s/p 1/15 L VATS evacuation of L hemothorax    left base pleural chest  dc'd- CXR-stable  Continue left apical pleural chest tube to water seal   Strict I & O's- monitor drainage from chest tube-poss remove in am  Daily CXR   Increase activity as tolerated, OOB to chair  Continue management as per primary team s/p 1/10 Left VATS pleurx catheter placement since removed   s/p 1/15 L VATS evacuation of L hemothorax    left base pleural chest  dc'd 1/22- CXR-stable  Continue left apical pleural chest tube to water seal   Strict I & O's- monitor drainage from chest tube  Daily CXR   Increase activity as tolerated, OOB to chair  Continue management as per primary team

## 2020-01-24 NOTE — PROGRESS NOTE ADULT - ASSESSMENT
75 y/o M presenting with septic shock and high grade SBO s/p exploratory laparotomy, lysis of adhesions, decompression of bowel via enterotomy w/ primary repair, and Abthera VAC placement on 12/6; s/p take down of Abthera, washout and re-application of the Abthera vac on 12/8. Now s/p SBR and end ileostomy on 12/10 acute respiratory distress now improving, Pneumothorax, hyperglycemia, delirium. Now still with persistent pneumothorax when chest pigtail clamped.   12/23 VSS on room air recommending IR drainage of left chest  12/24 No evidence of air leak to left chest tube. Tube clamped. Repeat chest cxr in 4 hours. Anticipate chest tube removal if lung remains expanded. Drainage of loculated left effusion discussed with IR. IR to reconsult for drainage once initial tube removed. Discussed plan with Dr Mayes and IR Fellow  12/24  On NC ,    VSSchest xray sm lt pl eff, left chest tube dc'd.  12/26    2l NC   co  sob,  lt side diminshed  12/27 VSS, CXR with unchanged loculated left pleural effusion- IR consulted for pigtail placement, states effusion can be drained via thoracentesis, pigtail placement not indicated at this time - Dr. Mayes to speak with IR attending.   12/30 Patient with persistent PTX  and left pleural effusion now for IR drainage with Dr Elkins.   12/31 HD stable, L PTC w/ high output, serosanguinous drainage, 990 ml overnight/ 2200 in 24 hrs. Tolerating 2L NC supplemental O2, Cyto pending, continue monitor drainage output.   1/1: LPTC still with High output-400/24h. Continue with pigtail cath drain.  1/ 2     lt pigtail draining  on lws  1/3 HD stable, left pigtail catheter continues to drain. Incentive spirometer encouraged, OOB and mobility, continue pulmonary toileting. Maintain chest tube to suction and monitor output.   1/4: Chest tube milked to prevent clogging of tube, Continues to drain with high out put.  1/5 remains with high out put 170/700. Continue drainage  1/6  persistent drainage from lt pl tube  1/7 Left pigtail 150/450. Daily CXR. Planning for pleurex cath placement on Friday 1/10 w/ Dr. Mayes  1/8        Lt pleural tube   persistent draining    1/9 preop for pleurX placement tomorrow. CT clamped. NPO after MN, type and cross x2.   1/10 s/p VATS L pleurx cath placement   1/11 HD stable, no resp distress, maintain L pleurex to suction overnight, CXR in AM.   1/12 pleurex waterseal  1/13   RRT  called for respiratory distress   plx to pleurvac   back to lws   min drainage  no a/l  1/14 VSS, Left pleurx catheter minimal drainage 10ml/24h, CXR: small left pleural effusion with left pleurx catheter in place.     1/15 Overnight episode of bradycardia and respiratory distress that stabilized with BiPap support, concerned for anemia requiring 2 U PRBC and increase in left pleural effusion, minimal drainage in pleurx.  1/15 s/p bronch, Left VATS. Evacuation of 2L blood from L chest, pleurX removal, chest ubes x2  1/16 HD stable, minimal vasopressors, CXR stable, currently SBT, CT x2 remains to suction, draining.   1/17 HD stable, CXR stable, will water seal chest tubes today, repeat CXR in AM. Cont to monitor drainage.   1/18: L apical chest tube with small AL. CXr appear stable tiny left apical PTX seen on cxr. Continue chest tube to water seal for now as d/w Dr. Mayes today.   1/19 Remains with high out put from left apical. Will continue both chest tubes for today as d/w Dr. Mayes/Luis  1/20 Still with high output drainage remains serosanguenos. Continue drains for today  1/21  vss ,    2 chest tubes continue to drain  1/22 Left chest tube output: apex 270ml/24h and base 150ml/24h. CXR B/L small pleural effusions with bibasilar pna. Plan to d/c left base chest tube today as per Dr. Murray.  1/23 L angled/base tube removed last pm--CXR stble.  Left CT 80/24 hrs--no air leak.   Possible removed remaining CT in am 75 y/o M presenting with septic shock and high grade SBO s/p exploratory laparotomy, lysis of adhesions, decompression of bowel via enterotomy w/ primary repair, and Abthera VAC placement on 12/6; s/p take down of Abthera, washout and re-application of the Abthera vac on 12/8. Now s/p SBR and end ileostomy on 12/10 acute respiratory distress now improving, Pneumothorax, hyperglycemia, delirium. Now still with persistent pneumothorax when chest pigtail clamped.   12/23 VSS on room air recommending IR drainage of left chest  12/24 No evidence of air leak to left chest tube. Tube clamped. Repeat chest cxr in 4 hours. Anticipate chest tube removal if lung remains expanded. Drainage of loculated left effusion discussed with IR. IR to reconsult for drainage once initial tube removed. Discussed plan with Dr Mayes and IR Fellow  12/24  On NC ,    VSSchest xray sm lt pl eff, left chest tube dc'd.  12/26    2l NC   co  sob,  lt side diminshed  12/27 VSS, CXR with unchanged loculated left pleural effusion- IR consulted for pigtail placement, states effusion can be drained via thoracentesis, pigtail placement not indicated at this time - Dr. Mayes to speak with IR attending.   12/30 Patient with persistent PTX  and left pleural effusion now for IR drainage with Dr Elkins.   12/31 HD stable, L PTC w/ high output, serosanguinous drainage, 990 ml overnight/ 2200 in 24 hrs. Tolerating 2L NC supplemental O2, Cyto pending, continue monitor drainage output.   1/1: LPTC still with High output-400/24h. Continue with pigtail cath drain.  1/ 2     lt pigtail draining  on lws  1/3 HD stable, left pigtail catheter continues to drain. Incentive spirometer encouraged, OOB and mobility, continue pulmonary toileting. Maintain chest tube to suction and monitor output.   1/4: Chest tube milked to prevent clogging of tube, Continues to drain with high out put.  1/5 remains with high out put 170/700. Continue drainage  1/6  persistent drainage from lt pl tube  1/7 Left pigtail 150/450. Daily CXR. Planning for pleurex cath placement on Friday 1/10 w/ Dr. Mayes  1/8        Lt pleural tube   persistent draining    1/9 preop for pleurX placement tomorrow. CT clamped. NPO after MN, type and cross x2.   1/10 s/p VATS L pleurx cath placement   1/11 HD stable, no resp distress, maintain L pleurex to suction overnight, CXR in AM.   1/12 pleurex waterseal  1/13   RRT  called for respiratory distress   plx to pleurvac   back to lws   min drainage  no a/l  1/14 VSS, Left pleurx catheter minimal drainage 10ml/24h, CXR: small left pleural effusion with left pleurx catheter in place.     1/15 Overnight episode of bradycardia and respiratory distress that stabilized with BiPap support, concerned for anemia requiring 2 U PRBC and increase in left pleural effusion, minimal drainage in pleurx.  1/15 s/p bronch, Left VATS. Evacuation of 2L blood from L chest, pleurX removal, chest ubes x2  1/16 HD stable, minimal vasopressors, CXR stable, currently SBT, CT x2 remains to suction, draining.   1/17 HD stable, CXR stable, will water seal chest tubes today, repeat CXR in AM. Cont to monitor drainage.   1/18: L apical chest tube with small AL. CXr appear stable tiny left apical PTX seen on cxr. Continue chest tube to water seal for now as d/w Dr. Mayes today.   1/19 Remains with high out put from left apical. Will continue both chest tubes for today as d/w Dr. Mayes/Luis  1/20 Still with high output drainage remains serosanguenos. Continue drains for today  1/21  vss ,    2 chest tubes continue to drain  1/22 Left chest tube output: apex 270ml/24h and base 150ml/24h. CXR B/L small pleural effusions with bibasilar pna. Plan to d/c left base chest tube today as per Dr. Murray.  1/23 L angled/base tube removed last pm--CXR stble.  Left CT 80/24 hrs--no air leak.   Possible removed remaining CT in am  1/24 VSS, Left chest tube output 110ml/24h another 30ml since 7am. CXR no PTX, small right pleural effusion, Lt apical chest tube in place.

## 2020-01-24 NOTE — PROGRESS NOTE ADULT - ATTENDING COMMENTS
Patient seen and examined  Continues to have high ileostomy output  One chest tube remains in place  Ileostomy is pink, viable and functioning    Appreciate continued SICU care  Continue TPN  Meropenem ordered until 1/26

## 2020-01-24 NOTE — PROGRESS NOTE ADULT - SUBJECTIVE AND OBJECTIVE BOX
GENERAL SURGERY PROGRESS NOTE    24 HOUR EVENTS:   - RVP sent due to lethargy to rule out the flu.   - Urine output remains high   - No overnight events  Patient seen and examined. No overnight events. Patient feels well, tolerating diet, on TPN, oob/ambi, pain controlled, denies f/c/n/v/d/sob, +flatus/BM       SUBJECTIVE    OVERNIGHT EVENTS:    10-point review of systems completed and negative except as noted above.      OBJECTIVE    MEDICATIONS  acetaminophen   Tablet .. 975 milliGRAM(s) Oral every 6 hours PRN  albuterol/ipratropium for Nebulization. 3 milliLiter(s) Nebulizer every 6 hours  buDESOnide    Inhalation Suspension 0.5 milliGRAM(s) Inhalation every 12 hours  chlorhexidine 2% Cloths 1 Application(s) Topical <User Schedule>  desmopressin Injectable 2 MICROGram(s) IV Push once  diphenoxylate/atropine 2 Tablet(s) Oral <User Schedule>  enoxaparin Injectable 40 milliGRAM(s) SubCutaneous daily  ergocalciferol 92267 Unit(s) Oral every week  fat emulsion (Fish Oil and Plant Based) 20% Infusion 12.5 mL/Hr IV Continuous <Continuous>  insulin lispro (HumaLOG) corrective regimen sliding scale   SubCutaneous every 6 hours  loperamide 16 milliGRAM(s) Oral <User Schedule>  meropenem  IVPB 1000 milliGRAM(s) IV Intermittent every 8 hours  octreotide  Injectable 100 MICROGram(s) SubCutaneous three times a day  ondansetron Injectable 4 milliGRAM(s) IV Push every 6 hours PRN  opium Tincture 6 milliGRAM(s) Oral <User Schedule>  pantoprazole    Tablet 40 milliGRAM(s) Oral before breakfast  Parenteral Nutrition - Adult 1 Each TPN Continuous <Continuous>  psyllium Powder 1 Packet(s) Oral three times a day  sodium chloride 0.9%. 1000 milliLiter(s) IV Continuous <Continuous>  tamsulosin 0.4 milliGRAM(s) Oral daily      PHYSICAL EXAM  T(C): 36.9 (01-24-20 @ 07:00), Max: 37.7 (01-23-20 @ 23:00)  HR: 90 (01-24-20 @ 10:00) (75 - 109)  BP: 158/72 (01-24-20 @ 10:00) (95/50 - 169/79)  RR: 26 (01-24-20 @ 10:00) (3 - 42)  SpO2: 92% (01-24-20 @ 10:00) (90% - 100%)    01-23-20 @ 07:01  -  01-24-20 @ 07:00  --------------------------------------------------------  IN: 3424.5 mL / OUT: 6795 mL / NET: -3370.5 mL    01-24-20 @ 07:01  -  01-24-20 @ 10:21  --------------------------------------------------------  IN: 730 mL / OUT: 900 mL / NET: -170 mL        General: Appears well, NAD  Neuro: AAOx3  CHEST: Clear to auscultation bilaterally, drain in place to water seal  CV: Regular rate and rhythm  Abdomen: soft, nontender, nondistended, no rebound or guarding, ostomy pink, intact, draining semisolid  Extremities: Grossly symmetric  : rosas in place, clear yellow    LABS                        7.2    7.78  )-----------( 293      ( 24 Jan 2020 00:18 )             22.9     01-24    137  |  98  |  12  ----------------------------<  145<H>  4.4   |  35<H>  |  0.52    Ca    7.6<L>      24 Jan 2020 00:18  Phos  2.3     01-24  Mg     1.9     01-24    TPro  5.1<L>  /  Alb  2.0<L>  /  TBili  0.3  /  DBili  0.1  /  AST  21  /  ALT  23  /  AlkPhos  83  01-24          RADIOLOGY & ADDITIONAL STUDIES

## 2020-01-24 NOTE — PROGRESS NOTE ADULT - ASSESSMENT
76 y/o M presenting with septic shock and high grade SBO s/p exploratory laparotomy, lysis of adhesions, decompression of bowel via enterotomy w/ primary repair, and Abthera VAC placement on 12/6; s/p take down of Abthera, washout and re-application of the Abthera vac on 12/8. 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 s/p L chest tube placement by IR 12/30 for pleural effusion now s/p VATS, with chest tube insertion for drainage of pleural effusion. RRT called 1/13 AM for hypoxia, patient transferred back to SICU.  Patient continued SOB, chest drain possible obstruction expanding. Patent taken back to OR emergently 1/15 for clot evac and VATS.     PLAN:  - Diet as tolerated  - OOb to chair  - Continue Lomotil, loperamide, tincture of opium, octreotide  - improving on meropenem, day 5 of 7  - Appreciate Continued SICU care    [facility]  [id number]

## 2020-01-24 NOTE — PROGRESS NOTE ADULT - SUBJECTIVE AND OBJECTIVE BOX
Subjective: Patient seen and examined. No new events except as noted.   Remains in ICU   resting comfortably   no cp or sob   24 HOUR EVENTS:   - RVP sent due to lethargy to rule out the flu.   - Urine output remains high   - No overnight events      REVIEW OF SYSTEMS:    CONSTITUTIONAL:+ weakness, fevers or chills  EYES/ENT: No visual changes;  No vertigo or throat pain   NECK: No pain or stiffness  RESPIRATORY: No cough, wheezing, hemoptysis; No shortness of breath  CARDIOVASCULAR: No chest pain or palpitations  GASTROINTESTINAL: No abdominal or epigastric pain. No nausea, vomiting, or hematemesis; No diarrhea or constipation. No melena or hematochezia.  GENITOURINARY: No dysuria, frequency or hematuria  NEUROLOGICAL: No numbness or weakness  SKIN: No itching, burning, rashes, or lesions   All other review of systems is negative unless indicated above.    MEDICATIONS:  MEDICATIONS  (STANDING):  albuterol/ipratropium for Nebulization. 3 milliLiter(s) Nebulizer every 6 hours  buDESOnide    Inhalation Suspension 0.5 milliGRAM(s) Inhalation every 12 hours  chlorhexidine 2% Cloths 1 Application(s) Topical <User Schedule>  diphenoxylate/atropine 2 Tablet(s) Oral <User Schedule>  enoxaparin Injectable 40 milliGRAM(s) SubCutaneous daily  ergocalciferol 92661 Unit(s) Oral every week  fat emulsion (Fish Oil and Plant Based) 20% Infusion 12.5 mL/Hr (12.5 mL/Hr) IV Continuous <Continuous>  insulin lispro (HumaLOG) corrective regimen sliding scale   SubCutaneous every 6 hours  loperamide 16 milliGRAM(s) Oral <User Schedule>  meropenem  IVPB 1000 milliGRAM(s) IV Intermittent every 8 hours  octreotide  Injectable 100 MICROGram(s) SubCutaneous three times a day  opium Tincture 6 milliGRAM(s) Oral <User Schedule>  pantoprazole    Tablet 40 milliGRAM(s) Oral before breakfast  Parenteral Nutrition - Adult 1 Each (50 mL/Hr) TPN Continuous <Continuous>  Parenteral Nutrition - Adult 1 Each (50 mL/Hr) TPN Continuous <Continuous>  psyllium Powder 1 Packet(s) Oral three times a day  sodium chloride 3 Gram(s) Oral every 8 hours  tamsulosin 0.4 milliGRAM(s) Oral daily      PHYSICAL EXAM:  T(C): 37.4 (01-24-20 @ 11:00), Max: 37.7 (01-23-20 @ 23:00)  HR: 90 (01-24-20 @ 11:34) (75 - 109)  BP: 127/58 (01-24-20 @ 11:00) (95/50 - 169/79)  RR: 20 (01-24-20 @ 11:00) (3 - 42)  SpO2: 100% (01-24-20 @ 11:34) (90% - 100%)  Wt(kg): --  I&O's Summary    23 Jan 2020 07:01  -  24 Jan 2020 07:00  --------------------------------------------------------  IN: 3424.5 mL / OUT: 6795 mL / NET: -3370.5 mL    24 Jan 2020 07:01  -  24 Jan 2020 11:39  --------------------------------------------------------  IN: 1020 mL / OUT: 1050 mL / NET: -30 mL              Appearance: NAD   HEENT:   Dry oral mucosa, crusted lips   Lymphatic: No lymphadenopathy   Cardiovascular: Normal S1 S2, No JVD, No murmurs , Peripheral pulses palpable 2+ bilaterally  Respiratory: Decreased bs R sided + pigtail   Gastrointestinal:  Soft, NT, ND. Ostomy pink with output. Midline staples in place, midline incision site is c/d/i   Skin: No rashes, No ecchymoses, No cyanosis, warm to touch  Musculoskeletal: Decreased  range of motion and strength  Psychiatry:  mildly sedated   Ext: No edema +PICC line   +rosas         LABS:    CARDIAC MARKERS:                                7.2    7.78  )-----------( 293      ( 24 Jan 2020 00:18 )             22.9     01-24    137  |  98  |  12  ----------------------------<  145<H>  4.4   |  35<H>  |  0.52    Ca    7.6<L>      24 Jan 2020 00:18  Phos  2.3     01-24  Mg     1.9     01-24    TPro  5.1<L>  /  Alb  2.0<L>  /  TBili  0.3  /  DBili  0.1  /  AST  21  /  ALT  23  /  AlkPhos  83  01-24    proBNP:   Lipid Profile:   HgA1c:   TSH:       Rapid Respiratory Viral Panel (01.23.20 @ 22:00)    Rapid RVP Result: NotDetec: This Respiratory Panel uses polymerase chain reaction (PCR) to detect for  adenovirus; coronavirus (HKU1, NL63, 229E, OC43); human metapneumovirus  (hMPV); human enterovirus/rhinovirus (Entero/RV); influenza A; influenza  A/H1; influenza A/H3; influenza A/H1-2009; influenza B; parainfluenza  viruses 1, 2, 3, 4; respiratory syncytial virus; Mycoplasma pneumoniae;  and Chlamydophila pneumoniae.          TELEMETRY: 	SR    ECG:  	  RADIOLOGY:   DIAGNOSTIC TESTING:  [ ] Echocardiogram:  [ ]  Catheterization:  [ ] Stress Test:    OTHER:

## 2020-01-24 NOTE — PROGRESS NOTE ADULT - SUBJECTIVE AND OBJECTIVE BOX
Patient is a 75y old  Male who presents with a chief complaint of abd. pain (2020 01:21)      Vital Signs Last 24 Hrs  T(C): 36.9 (20 @ 07:00), Max: 37.7 (20 @ 23:00)  T(F): 98.4 (20 @ 07:00), Max: 99.9 (20 @ 23:00)  HR: 93 (20 09:00) (75 - 109)  BP: 169/79 (20 @ 09:00) (95/50 - 169/79)  RR: 32 (20 @ 09:00) (3 - 42)  SpO2: 90% (20 @ 09:00) (90% - 100%)            20 07:  -  20 @ 07:00  --------------------------------------------------------  IN: 3424.5 mL / OUT: 6795 mL / NET: -3370.5 mL        Daily Weight in k.6 (2020 01:07)                          7.2    7.78  )-----------( 293      ( 2020 00:18 )             22.9     137  |  98  |  12  ----------------------------<  145<H>  4.4   |  35<H>  |  0.52      PHYSICAL EXAM  Neurology: A&Ox3, NAD  CV : RRR+S1S2  Lungs: Respirations non-labored, B/L BS clear, diminished at bases  +Left pleural chest tubes x2 to water seal with serosanguinous drainage, no air leak  Abdomen: Soft, NT/ND, +BSx4Q  Extremities: B/L LE warm, no edema, +PP           MEDICATIONS  acetaminophen   Tablet .. 975 milliGRAM(s) Oral every 6 hours PRN  albuterol/ipratropium for Nebulization. 3 milliLiter(s) Nebulizer every 6 hours  buDESOnide    Inhalation Suspension 0.5 milliGRAM(s) Inhalation every 12 hours  chlorhexidine 2% Cloths 1 Application(s) Topical <User Schedule>  diphenoxylate/atropine 2 Tablet(s) Oral <User Schedule>  enoxaparin Injectable 40 milliGRAM(s) SubCutaneous daily  ergocalciferol 84585 Unit(s) Oral every week  fat emulsion (Fish Oil and Plant Based) 20% Infusion 12.5 mL/Hr IV Continuous <Continuous>  insulin lispro (HumaLOG) corrective regimen sliding scale   SubCutaneous every 6 hours  loperamide 16 milliGRAM(s) Oral <User Schedule>  meropenem  IVPB 1000 milliGRAM(s) IV Intermittent every 8 hours  octreotide  Injectable 100 MICROGram(s) SubCutaneous three times a day  ondansetron Injectable 4 milliGRAM(s) IV Push every 6 hours PRN  opium Tincture 6 milliGRAM(s) Oral <User Schedule>  pantoprazole    Tablet 40 milliGRAM(s) Oral before breakfast  Parenteral Nutrition - Adult 1 Each TPN Continuous <Continuous>  psyllium Powder 1 Packet(s) Oral three times a day  sodium chloride 0.9%. 1000 milliLiter(s) IV Continuous <Continuous>  tamsulosin 0.4 milliGRAM(s) Oral daily Patient is a 75y old  Male who presents with a chief complaint of abd. pain (2020 01:21)      Vital Signs Last 24 Hrs  T(C): 36.9 (20 @ 07:00), Max: 37.7 (20 @ 23:00)  T(F): 98.4 (20 @ 07:00), Max: 99.9 (20 @ 23:00)  HR: 93 (20 09:00) (75 - 109)  BP: 169/79 (20 @ 09:00) (95/50 - 169/79)  RR: 32 (20 @ 09:00) (3 - 42)  SpO2: 90% (20 @ 09:00) (90% - 100%)            20 07:  -  20 @ 07:00  --------------------------------------------------------  IN: 3424.5 mL / OUT: 6795 mL / NET: -3370.5 mL        Daily Weight in k.6 (2020 01:07)                          7.2    7.78  )-----------( 293      ( 2020 00:18 )             22.9     137  |  98  |  12  ----------------------------<  145<H>  4.4   |  35<H>  |  0.52      PHYSICAL EXAM  Neurology: A&Ox3, NAD  CV : RRR+S1S2  Lungs: Respirations non-labored, B/L BS clear, diminished at bases  +Left pleural chest tube to water seal with serosanguinous drainage, no air leak  Abdomen: Soft, NT/ND, +BSx4Q  Extremities: B/L LE warm, no edema, +PP           MEDICATIONS  acetaminophen   Tablet .. 975 milliGRAM(s) Oral every 6 hours PRN  albuterol/ipratropium for Nebulization. 3 milliLiter(s) Nebulizer every 6 hours  buDESOnide    Inhalation Suspension 0.5 milliGRAM(s) Inhalation every 12 hours  chlorhexidine 2% Cloths 1 Application(s) Topical <User Schedule>  diphenoxylate/atropine 2 Tablet(s) Oral <User Schedule>  enoxaparin Injectable 40 milliGRAM(s) SubCutaneous daily  ergocalciferol 65023 Unit(s) Oral every week  fat emulsion (Fish Oil and Plant Based) 20% Infusion 12.5 mL/Hr IV Continuous <Continuous>  insulin lispro (HumaLOG) corrective regimen sliding scale   SubCutaneous every 6 hours  loperamide 16 milliGRAM(s) Oral <User Schedule>  meropenem  IVPB 1000 milliGRAM(s) IV Intermittent every 8 hours  octreotide  Injectable 100 MICROGram(s) SubCutaneous three times a day  ondansetron Injectable 4 milliGRAM(s) IV Push every 6 hours PRN  opium Tincture 6 milliGRAM(s) Oral <User Schedule>  pantoprazole    Tablet 40 milliGRAM(s) Oral before breakfast  Parenteral Nutrition - Adult 1 Each TPN Continuous <Continuous>  psyllium Powder 1 Packet(s) Oral three times a day  sodium chloride 0.9%. 1000 milliLiter(s) IV Continuous <Continuous>  tamsulosin 0.4 milliGRAM(s) Oral daily

## 2020-01-24 NOTE — CHART NOTE - NSCHARTNOTEFT_GEN_A_CORE
Nutrition Follow Up Note.    Patient seen for: malnutrition/TPN Team follow up.    Source: patient, medical record, 8ICU team, TPN Team rounds    Chart reviewed, events noted.     Nutrition Status: Malnutrition. Pt with 150 cm small bowel remaining after GI surgery x 3. TPN initiated . Pt has been tolerating  po diet since . TPN discontinued as of 1/15; resumed at half goal . Per 8ICU team, pt is planned for revision in early February.     Pt with ongoing high ileostomy output, with fluid repletions (0.25/1cc NS q12hrs). Fecal fat results indicate some degree of fat malabsorption. Rx for Lomotil, Imodium, opium tincture, metamucil powder, and octreotide noted.    Diet: Mechanical Soft    Supplements:  Ensure Enlive (350cal, 20Gm protein per 8oz serving) x 2  Promote x 2  Prosource (60cal, 15Gm protein per serving) x 2  MCT oil 15ml     Parenteral Nutrition: Half-dose TPN (ordered ) 1200ml infusing at 50ml/hr (60Gm amino acids, 140Gm dextrose, 30Gm SMOF lipids) to provide 1016cal/day (18cal/Kg and 1.1Gm protein/Kg per dosing wt 54.2Kg); with 10ml MVI, 3ml MTE-5.     PO Intake: Pt reports improved po intake. Pt dislikes Promote (high MCT:LCT ratio) and Prosource (protein supplement), but is tolerating Ensure Enlive (with questionable absorption).    Ileostomy output: 2600ml (), 2500ml ()    Daily Weight in k.6 (), Weight in k.2 (), Weight in k.1 (), Weight in k.2 (), Weight in k.9 (), Weight in k.1 (), Weight in k.6 ()    Drug Dosing Weight  Weight (kg): 54 (15 Isma 2020 15:12)  BMI (kg/m2): 17.6 (15 Isma 2020 15:12)    Pertinent Medications: MEDICATIONS  (STANDING):  albuterol/ipratropium for Nebulization. 3 milliLiter(s) Nebulizer every 6 hours  buDESOnide    Inhalation Suspension 0.5 milliGRAM(s) Inhalation every 12 hours  chlorhexidine 2% Cloths 1 Application(s) Topical <User Schedule>  diphenoxylate/atropine 2 Tablet(s) Oral <User Schedule>  enoxaparin Injectable 40 milliGRAM(s) SubCutaneous daily  ergocalciferol 10653 Unit(s) Oral every week  fat emulsion (Fish Oil and Plant Based) 20% Infusion 12.5 mL/Hr (12.5 mL/Hr) IV Continuous <Continuous>  insulin lispro (HumaLOG) corrective regimen sliding scale   SubCutaneous every 6 hours  loperamide 16 milliGRAM(s) Oral <User Schedule>  meropenem  IVPB 1000 milliGRAM(s) IV Intermittent every 8 hours  octreotide  Injectable 100 MICROGram(s) SubCutaneous three times a day  opium Tincture 6 milliGRAM(s) Oral <User Schedule>  pantoprazole    Tablet 40 milliGRAM(s) Oral before breakfast  Parenteral Nutrition - Adult 1 Each (50 mL/Hr) TPN Continuous <Continuous>  Parenteral Nutrition - Adult 1 Each (50 mL/Hr) TPN Continuous <Continuous>  psyllium Powder 1 Packet(s) Oral three times a day  sodium chloride 3 Gram(s) Oral every 8 hours  tamsulosin 0.4 milliGRAM(s) Oral daily    MEDICATIONS  (PRN):  acetaminophen   Tablet .. 975 milliGRAM(s) Oral every 6 hours PRN Mild Pain (1 - 3)  ondansetron Injectable 4 milliGRAM(s) IV Push every 6 hours PRN Nausea and/or Vomiting      LABS:    @ 02:53: Prealbumin 16<L>   @ 00:18: Sodium 137, Potassium 4.4, Chloride 98, Calcium 7.6<L>, Magnesium 1.9, Phosphorus 2.3<L>, BUN 12, Creatinine 0.52, <H>, Alk Phos 83, ALT/SGPT 23, AST/SGOT 21, Total Protein 5.1<L>, Albumin 2.0<L>, Total Bilirubin 0.3, Direct Bilirubin 0.1, Hemoglobin 7.2<L>, Hematocrit 22.9<L>    POCT Blood Glucose.: 158 mg/dL (2020 11:04)  POCT Blood Glucose.: 133 mg/dL (2020 06:12)  POCT Blood Glucose.: 135 mg/dL (2020 23:04)  POCT Blood Glucose.: 188 mg/dL (2020 17:15)  POCT Blood Glucose.: 137 mg/dL (2020 12:30)    Triglycerides, Serum: 78 mg/dL (20 @ 00:18)  Triglycerides, Serum: 76 mg/dL (20 @ 00:26)  Triglycerides, Serum: 51 mg/dL (01-15-20 @ 01:44)  Triglycerides, Serum: 60 mg/dL (20 @ 03:31)  Triglycerides, Serum: 50 mg/dL (20 @ 01:05)  Triglycerides, Serum: 48 mg/dL (20 @ 02:44)  Triglycerides, Serum: 56 mg/dL (20 @ 00:42)    Skin per nursing documentation: no pressure injuries documented  Edema: 1+ left/right foot, ankle    Estimated Needs: based on dosing wt 54.2Kg, with consideration for TPN, malnutrition  7228-8929 gregorio/day (25-30cal/Kg)   Gm protein/day (1.8-2.2Gm/Kg)     Previous Nutrition Diagnosis: Severe malnutrition  Nutrition Diagnosis is: ongoing, being addressed po diet, supplements, and half dose TPN    New Nutrition Diagnosis: none     Interventions: Continue TPN at half goal; monitor po intake    Recommend  1) TPN per TPN Team/Nutrition assessment; continue at half goal until surgery in February  2) Continue Mechanical Soft diet; monitor po intake  3) Continue 2 servings Ensure Enlive (350cal, 20Gm protein per 8oz serving)   4) Reduce Prosource (60cal, 15Gm protein) to one serving daily  5) Discontinue MCT oil  and Promote supplements; communicated with PA    Monitoring and Evaluation:     Continue to monitor nutrition provision and tolerance, weights, labs, skin integrity.    RD remains available upon request and will follow up per protocol.    Sowmya Graham MS RD CDN [facility], Pager # [phone]. Nutrition Follow Up Note.    Patient seen for: malnutrition/TPN Team follow up.    Source: patient, medical record, 8ICU team, TPN Team rounds    Chart reviewed, events noted.     Nutrition Status: Malnutrition. Pt with 150 cm small bowel remaining after GI surgery x 3. TPN initiated . Pt has been tolerating  po diet since . TPN discontinued as of 1/15; resumed at half goal . Per 8ICU team, pt is planned for revision in early February.     Pt with ongoing high ileostomy output, with fluid repletions (0.25/1cc NS q12hrs). Fecal fat results indicate some degree of fat malabsorption. Rx for Lomotil, Imodium, opium tincture, metamucil powder, and octreotide noted.    Diet: Mechanical Soft    Supplements:  Ensure Enlive (350cal, 20Gm protein per 8oz serving) x 2  Promote x 2  Prosource (60cal, 15Gm protein per serving) x 2  MCT oil 15ml     Parenteral Nutrition: Half-dose TPN (ordered ) 1200ml infusing at 50ml/hr (60Gm amino acids, 140Gm dextrose, 30Gm SMOF lipids) to provide 1016cal/day (18cal/Kg and 1.1Gm protein/Kg per dosing wt 54.2Kg); with 10ml MVI, 3ml MTE-5.     PO Intake: Pt reports improved po intake. Pt dislikes Promote (high MCT:LCT ratio) and Prosource (protein supplement), but is tolerating Ensure Enlive (with questionable absorption). Food preferences taken. Pt is amenable to trying Magic Cup supplement (290 calories, 9 Gm protein).     Ileostomy output: 2600ml (), 2500ml ()    Daily Weight in k.6 (), Weight in k.2 (), Weight in k.1 (), Weight in k.2 (), Weight in k.9 (), Weight in k.1 (-), Weight in k.6 ()    Drug Dosing Weight  Weight (kg): 54 (15 Isma 2020 15:12)  BMI (kg/m2): 17.6 (15 Isma 2020 15:12)    Pertinent Medications: MEDICATIONS  (STANDING):  albuterol/ipratropium for Nebulization. 3 milliLiter(s) Nebulizer every 6 hours  buDESOnide    Inhalation Suspension 0.5 milliGRAM(s) Inhalation every 12 hours  chlorhexidine 2% Cloths 1 Application(s) Topical <User Schedule>  diphenoxylate/atropine 2 Tablet(s) Oral <User Schedule>  enoxaparin Injectable 40 milliGRAM(s) SubCutaneous daily  ergocalciferol 81073 Unit(s) Oral every week  fat emulsion (Fish Oil and Plant Based) 20% Infusion 12.5 mL/Hr (12.5 mL/Hr) IV Continuous <Continuous>  insulin lispro (HumaLOG) corrective regimen sliding scale   SubCutaneous every 6 hours  loperamide 16 milliGRAM(s) Oral <User Schedule>  meropenem  IVPB 1000 milliGRAM(s) IV Intermittent every 8 hours  octreotide  Injectable 100 MICROGram(s) SubCutaneous three times a day  opium Tincture 6 milliGRAM(s) Oral <User Schedule>  pantoprazole    Tablet 40 milliGRAM(s) Oral before breakfast  Parenteral Nutrition - Adult 1 Each (50 mL/Hr) TPN Continuous <Continuous>  Parenteral Nutrition - Adult 1 Each (50 mL/Hr) TPN Continuous <Continuous>  psyllium Powder 1 Packet(s) Oral three times a day  sodium chloride 3 Gram(s) Oral every 8 hours  tamsulosin 0.4 milliGRAM(s) Oral daily    MEDICATIONS  (PRN):  acetaminophen   Tablet .. 975 milliGRAM(s) Oral every 6 hours PRN Mild Pain (1 - 3)  ondansetron Injectable 4 milliGRAM(s) IV Push every 6 hours PRN Nausea and/or Vomiting      LABS:    @ 02:53: Prealbumin 16<L>   @ 00:18: Sodium 137, Potassium 4.4, Chloride 98, Calcium 7.6<L>, Magnesium 1.9, Phosphorus 2.3<L>, BUN 12, Creatinine 0.52, <H>, Alk Phos 83, ALT/SGPT 23, AST/SGOT 21, Total Protein 5.1<L>, Albumin 2.0<L>, Total Bilirubin 0.3, Direct Bilirubin 0.1, Hemoglobin 7.2<L>, Hematocrit 22.9<L>    POCT Blood Glucose.: 158 mg/dL (2020 11:04)  POCT Blood Glucose.: 133 mg/dL (2020 06:12)  POCT Blood Glucose.: 135 mg/dL (2020 23:04)  POCT Blood Glucose.: 188 mg/dL (2020 17:15)  POCT Blood Glucose.: 137 mg/dL (2020 12:30)    Triglycerides, Serum: 78 mg/dL (20 @ 00:18)  Triglycerides, Serum: 76 mg/dL (20 @ 00:26)  Triglycerides, Serum: 51 mg/dL (01-15-20 @ 01:44)  Triglycerides, Serum: 60 mg/dL (20 @ 03:31)  Triglycerides, Serum: 50 mg/dL (20 @ 01:05)  Triglycerides, Serum: 48 mg/dL (20 @ 02:44)  Triglycerides, Serum: 56 mg/dL (20 @ 00:42)    Skin per nursing documentation: no pressure injuries documented  Edema: 1+ left/right foot, ankle    Estimated Needs: based on dosing wt 54.2Kg, with consideration for TPN, malnutrition  4727-4661 gregorio/day (25-30cal/Kg)   Gm protein/day (1.8-2.2Gm/Kg)     Previous Nutrition Diagnosis: Severe malnutrition  Nutrition Diagnosis is: ongoing, being addressed po diet, supplements, and half dose TPN    New Nutrition Diagnosis: none     Interventions: Continue TPN at half goal; monitor po intake    Recommend  1) TPN per TPN Team/Nutrition assessment; continue at half goal until surgery in February  2) Continue Mechanical Soft diet. Food preferences taken; Magic Cup supplement (290 calories, 9 Gm protein) added to meal trays.  3) Continue 2 servings Ensure Enlive (350cal, 20Gm protein per 8oz serving)   4) Reduce Prosource (60cal, 15Gm protein) to one serving daily  5) Discontinue MCT oil  and Promote supplements; communicated with PA    Monitoring and Evaluation:     Continue to monitor nutrition provision and tolerance, weights, labs, skin integrity.    RD remains available upon request and will follow up per protocol.    Sowmya Graham, MS RD CDN [facility], Pager # [phone].

## 2020-01-25 LAB
ALBUMIN SERPL ELPH-MCNC: 2.3 G/DL — LOW (ref 3.3–5)
ALP SERPL-CCNC: 83 U/L — SIGNIFICANT CHANGE UP (ref 40–120)
ALT FLD-CCNC: 47 U/L — HIGH (ref 10–45)
ANION GAP SERPL CALC-SCNC: 10 MMOL/L — SIGNIFICANT CHANGE UP (ref 5–17)
AST SERPL-CCNC: 42 U/L — HIGH (ref 10–40)
BILIRUB DIRECT SERPL-MCNC: 0.1 MG/DL — SIGNIFICANT CHANGE UP (ref 0–0.2)
BILIRUB INDIRECT FLD-MCNC: 0.2 MG/DL — SIGNIFICANT CHANGE UP (ref 0.2–1)
BILIRUB SERPL-MCNC: 0.3 MG/DL — SIGNIFICANT CHANGE UP (ref 0.2–1.2)
BUN SERPL-MCNC: 17 MG/DL — SIGNIFICANT CHANGE UP (ref 7–23)
CA-I BLD-SCNC: 1.13 MMOL/L — SIGNIFICANT CHANGE UP (ref 1.12–1.3)
CALCIUM SERPL-MCNC: 8 MG/DL — LOW (ref 8.4–10.5)
CHLORIDE SERPL-SCNC: 97 MMOL/L — SIGNIFICANT CHANGE UP (ref 96–108)
CO2 SERPL-SCNC: 32 MMOL/L — HIGH (ref 22–31)
CREAT SERPL-MCNC: 0.41 MG/DL — LOW (ref 0.5–1.3)
GLUCOSE BLDC GLUCOMTR-MCNC: 106 MG/DL — HIGH (ref 70–99)
GLUCOSE BLDC GLUCOMTR-MCNC: 116 MG/DL — HIGH (ref 70–99)
GLUCOSE BLDC GLUCOMTR-MCNC: 120 MG/DL — HIGH (ref 70–99)
GLUCOSE BLDC GLUCOMTR-MCNC: 144 MG/DL — HIGH (ref 70–99)
GLUCOSE SERPL-MCNC: 108 MG/DL — HIGH (ref 70–99)
HCT VFR BLD CALC: 24.1 % — LOW (ref 39–50)
HGB BLD-MCNC: 7.5 G/DL — LOW (ref 13–17)
MAGNESIUM SERPL-MCNC: 1.9 MG/DL — SIGNIFICANT CHANGE UP (ref 1.6–2.6)
MCHC RBC-ENTMCNC: 31 PG — SIGNIFICANT CHANGE UP (ref 27–34)
MCHC RBC-ENTMCNC: 31.1 GM/DL — LOW (ref 32–36)
MCV RBC AUTO: 99.6 FL — SIGNIFICANT CHANGE UP (ref 80–100)
NRBC # BLD: 0 /100 WBCS — SIGNIFICANT CHANGE UP (ref 0–0)
PHOSPHATE SERPL-MCNC: 2.7 MG/DL — SIGNIFICANT CHANGE UP (ref 2.5–4.5)
PLATELET # BLD AUTO: 307 K/UL — SIGNIFICANT CHANGE UP (ref 150–400)
POTASSIUM SERPL-MCNC: 4.6 MMOL/L — SIGNIFICANT CHANGE UP (ref 3.5–5.3)
POTASSIUM SERPL-SCNC: 4.6 MMOL/L — SIGNIFICANT CHANGE UP (ref 3.5–5.3)
PROT SERPL-MCNC: 5.6 G/DL — LOW (ref 6–8.3)
RBC # BLD: 2.42 M/UL — LOW (ref 4.2–5.8)
RBC # FLD: 14.7 % — HIGH (ref 10.3–14.5)
SODIUM SERPL-SCNC: 139 MMOL/L — SIGNIFICANT CHANGE UP (ref 135–145)
WBC # BLD: 7.42 K/UL — SIGNIFICANT CHANGE UP (ref 3.8–10.5)
WBC # FLD AUTO: 7.42 K/UL — SIGNIFICANT CHANGE UP (ref 3.8–10.5)

## 2020-01-25 PROCEDURE — 99232 SBSQ HOSP IP/OBS MODERATE 35: CPT

## 2020-01-25 PROCEDURE — 99233 SBSQ HOSP IP/OBS HIGH 50: CPT | Mod: GC

## 2020-01-25 PROCEDURE — 71045 X-RAY EXAM CHEST 1 VIEW: CPT | Mod: 26

## 2020-01-25 PROCEDURE — 71045 X-RAY EXAM CHEST 1 VIEW: CPT | Mod: 26,77

## 2020-01-25 PROCEDURE — 99024 POSTOP FOLLOW-UP VISIT: CPT

## 2020-01-25 RX ORDER — SODIUM CHLORIDE 9 MG/ML
500 INJECTION, SOLUTION INTRAVENOUS ONCE
Refills: 0 | Status: COMPLETED | OUTPATIENT
Start: 2020-01-25 | End: 2020-01-25

## 2020-01-25 RX ORDER — MAGNESIUM SULFATE 500 MG/ML
2 VIAL (ML) INJECTION ONCE
Refills: 0 | Status: COMPLETED | OUTPATIENT
Start: 2020-01-25 | End: 2020-01-25

## 2020-01-25 RX ORDER — DESMOPRESSIN ACETATE 0.1 MG/1
2 TABLET ORAL ONCE
Refills: 0 | Status: COMPLETED | OUTPATIENT
Start: 2020-01-25 | End: 2020-01-25

## 2020-01-25 RX ORDER — ELECTROLYTE SOLUTION,INJ
1 VIAL (ML) INTRAVENOUS
Refills: 0 | Status: DISCONTINUED | OUTPATIENT
Start: 2020-01-25 | End: 2020-01-25

## 2020-01-25 RX ORDER — I.V. FAT EMULSION 20 G/100ML
12.5 EMULSION INTRAVENOUS
Qty: 30 | Refills: 0 | Status: DISCONTINUED | OUTPATIENT
Start: 2020-01-25 | End: 2020-01-26

## 2020-01-25 RX ADMIN — Medication 3 MILLILITER(S): at 18:25

## 2020-01-25 RX ADMIN — Medication 0.5 MILLIGRAM(S): at 07:14

## 2020-01-25 RX ADMIN — Medication 2 TABLET(S): at 12:55

## 2020-01-25 RX ADMIN — Medication 1 EACH: at 17:42

## 2020-01-25 RX ADMIN — MEROPENEM 100 MILLIGRAM(S): 1 INJECTION INTRAVENOUS at 22:15

## 2020-01-25 RX ADMIN — Medication 1 PACKET(S): at 05:33

## 2020-01-25 RX ADMIN — Medication 2 TABLET(S): at 18:27

## 2020-01-25 RX ADMIN — OCTREOTIDE ACETATE 100 MICROGRAM(S): 200 INJECTION, SOLUTION INTRAVENOUS; SUBCUTANEOUS at 22:16

## 2020-01-25 RX ADMIN — PANTOPRAZOLE SODIUM 40 MILLIGRAM(S): 20 TABLET, DELAYED RELEASE ORAL at 08:07

## 2020-01-25 RX ADMIN — MORPHINE 6 MILLIGRAM(S): 10 SOLUTION ORAL at 12:54

## 2020-01-25 RX ADMIN — MEROPENEM 100 MILLIGRAM(S): 1 INJECTION INTRAVENOUS at 14:00

## 2020-01-25 RX ADMIN — MEROPENEM 100 MILLIGRAM(S): 1 INJECTION INTRAVENOUS at 05:31

## 2020-01-25 RX ADMIN — Medication 2 TABLET(S): at 08:10

## 2020-01-25 RX ADMIN — DESMOPRESSIN ACETATE 2 MICROGRAM(S): 0.1 TABLET ORAL at 15:06

## 2020-01-25 RX ADMIN — SODIUM CHLORIDE 3 GRAM(S): 9 INJECTION INTRAMUSCULAR; INTRAVENOUS; SUBCUTANEOUS at 05:32

## 2020-01-25 RX ADMIN — ENOXAPARIN SODIUM 40 MILLIGRAM(S): 100 INJECTION SUBCUTANEOUS at 12:49

## 2020-01-25 RX ADMIN — Medication 16 MILLIGRAM(S): at 08:10

## 2020-01-25 RX ADMIN — DESMOPRESSIN ACETATE 2 MICROGRAM(S): 0.1 TABLET ORAL at 22:15

## 2020-01-25 RX ADMIN — Medication 0.5 MILLIGRAM(S): at 18:26

## 2020-01-25 RX ADMIN — OCTREOTIDE ACETATE 100 MICROGRAM(S): 200 INJECTION, SOLUTION INTRAVENOUS; SUBCUTANEOUS at 05:33

## 2020-01-25 RX ADMIN — I.V. FAT EMULSION 12.5 ML/HR: 20 EMULSION INTRAVENOUS at 17:42

## 2020-01-25 RX ADMIN — SODIUM CHLORIDE 1000 MILLILITER(S): 9 INJECTION, SOLUTION INTRAVENOUS at 21:16

## 2020-01-25 RX ADMIN — Medication 3 MILLILITER(S): at 11:35

## 2020-01-25 RX ADMIN — CHLORHEXIDINE GLUCONATE 1 APPLICATION(S): 213 SOLUTION TOPICAL at 05:31

## 2020-01-25 RX ADMIN — SODIUM CHLORIDE 1000 MILLILITER(S): 9 INJECTION, SOLUTION INTRAVENOUS at 14:36

## 2020-01-25 RX ADMIN — MORPHINE 6 MILLIGRAM(S): 10 SOLUTION ORAL at 08:07

## 2020-01-25 RX ADMIN — OCTREOTIDE ACETATE 100 MICROGRAM(S): 200 INJECTION, SOLUTION INTRAVENOUS; SUBCUTANEOUS at 14:00

## 2020-01-25 RX ADMIN — Medication 16 MILLIGRAM(S): at 22:52

## 2020-01-25 RX ADMIN — MORPHINE 6 MILLIGRAM(S): 10 SOLUTION ORAL at 22:55

## 2020-01-25 RX ADMIN — Medication 1 PACKET(S): at 22:15

## 2020-01-25 RX ADMIN — Medication 1 PACKET(S): at 13:43

## 2020-01-25 RX ADMIN — TAMSULOSIN HYDROCHLORIDE 0.4 MILLIGRAM(S): 0.4 CAPSULE ORAL at 12:49

## 2020-01-25 RX ADMIN — Medication 3 MILLILITER(S): at 07:14

## 2020-01-25 RX ADMIN — Medication 2 TABLET(S): at 22:51

## 2020-01-25 RX ADMIN — Medication 50 GRAM(S): at 05:31

## 2020-01-25 RX ADMIN — MORPHINE 6 MILLIGRAM(S): 10 SOLUTION ORAL at 17:39

## 2020-01-25 RX ADMIN — Medication 16 MILLIGRAM(S): at 18:27

## 2020-01-25 RX ADMIN — Medication 16 MILLIGRAM(S): at 12:56

## 2020-01-25 NOTE — PROGRESS NOTE ADULT - SUBJECTIVE AND OBJECTIVE BOX
Patient is a 75y old  Male who presents with a chief complaint of abd. pain (2020 08:55)      Vital Signs Last 24 Hrs  T(C): 36.7 (20 @ 11:00), Max: 37.6 (20 @ 15:00)  T(F): 98.1 (20 @ 11:00), Max: 99.7 (20 @ 15:00)  HR: 78 (20 11:37) (77 - 102)  BP: 99/55 (20 @ 11:00) (91/55 - 157/72)  RR: 23 (20 @ 11:00) (14 - 38)  SpO2: 100% (20 @ 11:37) (95% - 100%)            20 @ 07:01  -  20 @ 07:00  --------------------------------------------------------  IN: 3520 mL / OUT: 4475 mL / NET: -955 mL        Daily Weight in k.5 (2020 00:22)                          7.5    7.42  )-----------( 307      ( 2020 03:51 )             24.1       139  |  97  |  17  ----------------------------<  108<H>  4.6   |  32<H>  |  0.41<L>          PHYSICAL EXAM  Neurology: A&Ox3, NAD  CV : RRR+S1S2  Lungs: Respirations non-labored, B/L BS clear, diminished at bases  +Left pleural chest tube to water seal with serosanguinous drainage, no air leak  Abdomen: Soft, NT/ND, +[id number]  Extremities: B/L LE warm, no edema, +PP           MEDICATIONS  acetaminophen   Tablet .. 975 milliGRAM(s) Oral every 6 hours PRN  albuterol/ipratropium for Nebulization. 3 milliLiter(s) Nebulizer every 6 hours  buDESOnide    Inhalation Suspension 0.5 milliGRAM(s) Inhalation every 12 hours  chlorhexidine 2% Cloths 1 Application(s) Topical <User Schedule>  diphenoxylate/atropine 2 Tablet(s) Oral <User Schedule>  enoxaparin Injectable 40 milliGRAM(s) SubCutaneous daily  ergocalciferol 08308 Unit(s) Oral every week  fat emulsion (Fish Oil and Plant Based) 20% Infusion 12.5 mL/Hr IV Continuous <Continuous>  insulin lispro (HumaLOG) corrective regimen sliding scale   SubCutaneous every 6 hours  loperamide 16 milliGRAM(s) Oral <User Schedule>  meropenem  IVPB 1000 milliGRAM(s) IV Intermittent every 8 hours  octreotide  Injectable 100 MICROGram(s) SubCutaneous three times a day  ondansetron Injectable 4 milliGRAM(s) IV Push every 6 hours PRN  opium Tincture 6 milliGRAM(s) Oral <User Schedule>  pantoprazole    Tablet 40 milliGRAM(s) Oral before breakfast  Parenteral Nutrition - Adult 1 Each TPN Continuous <Continuous>  Parenteral Nutrition - Adult 1 Each TPN Continuous <Continuous>  psyllium Powder 1 Packet(s) Oral three times a day  sodium chloride 3 Gram(s) Oral every 8 hours  tamsulosin 0.4 milliGRAM(s) Oral daily

## 2020-01-25 NOTE — PROGRESS NOTE ADULT - SUBJECTIVE AND OBJECTIVE BOX
[facility] NUTRITION SUPPORT--  Attending/ PA FOLLOW UP NOTE      24 hour events/subjective: Denies Palpitations, chest pain, shortness of breath. Denies nausea nor vomiting nor abdominal pain.    24 HOUR EVENTS:  - 2mcg of DDAVP administered for polyuria to good effect.   - Nephrology consulted for polyuria; recommended discontinuing ostomy repletions which were then D/C.     PAST HISTORY  --------------------------------------------------------------------------------  No significant changes to PMH, PSH, FHx, SHx, unless otherwise noted    ALLERGIES & MEDICATIONS  --------------------------------------------------------------------------------  Allergies    IV Contrast (Hives)    Intolerances      Standing Inpatient Medications  albuterol/ipratropium for Nebulization. 3 milliLiter(s) Nebulizer every 6 hours  buDESOnide    Inhalation Suspension 0.5 milliGRAM(s) Inhalation every 12 hours  chlorhexidine 2% Cloths 1 Application(s) Topical <User Schedule>  diphenoxylate/atropine 2 Tablet(s) Oral <User Schedule>  enoxaparin Injectable 40 milliGRAM(s) SubCutaneous daily  ergocalciferol 05421 Unit(s) Oral every week  fat emulsion (Fish Oil and Plant Based) 20% Infusion 12.5 mL/Hr IV Continuous <Continuous>  insulin lispro (HumaLOG) corrective regimen sliding scale   SubCutaneous every 6 hours  loperamide 16 milliGRAM(s) Oral <User Schedule>  meropenem  IVPB 1000 milliGRAM(s) IV Intermittent every 8 hours  octreotide  Injectable 100 MICROGram(s) SubCutaneous three times a day  opium Tincture 6 milliGRAM(s) Oral <User Schedule>  pantoprazole    Tablet 40 milliGRAM(s) Oral before breakfast  Parenteral Nutrition - Adult 1 Each TPN Continuous <Continuous>  psyllium Powder 1 Packet(s) Oral three times a day  sodium chloride 3 Gram(s) Oral every 8 hours  tamsulosin 0.4 milliGRAM(s) Oral daily    PRN Inpatient Medications  acetaminophen   Tablet .. 975 milliGRAM(s) Oral every 6 hours PRN  ondansetron Injectable 4 milliGRAM(s) IV Push every 6 hours PRN      REVIEW OF SYSTEMS  --------------------------------------------------------------------------------  Gen: as per HPI  Skin: No rashes  Head/Eyes/Ears/Mouth: No headache;No sore throat  Respiratory: No dyspnea, cough,   CV: No chest pain, PND, orthopnea  GI: as per HPI  : No increased frequency, dysuria, hematuria, nocturia  MSK: No joint pain/swelling; no back pain; no edema  Neuro: No dizziness/lightheadedness, weakness, seizures, numbness, tingling  Psych: No significant nervousness, anxiety, stress, depression    All other systems were reviewed and are negative, except as noted.      LABS/STUDIES  --------------------------------------------------------------------------------              7.5    7.42  >-----------<  307      [01-25-20 @ 03:51]              24.1     139  |  97  |  17  ----------------------------<  108      [01-25-20 @ 03:51]  4.6   |  32  |  0.41        Ca     8.0     [01-25-20 @ 03:51]      iCa    1.13     [01-25 @ 05:02]      Mg     1.9     [01-25-20 @ 03:51]      Phos  2.7     [01-25-20 @ 03:51]    TPro  5.6  /  Alb  2.3  /  TBili  0.3  /  DBili  0.1  /  AST  42  /  ALT  47  /  AlkPhos  83  [01-25-20 @ 03:51]            Glucose, Serum: 108 mg/dL (01-25-20 @ 03:51)    PrealbuminPrealbumin, Serum: 16 mg/dL (01-24-20 @ 02:53)  Prealbumin, Serum: 13 mg/dL (01-21-20 @ 02:00)  Prealbumin, Serum: 15 mg/dL (01-15-20 @ 09:01)  Prealbumin, Serum: 13 mg/dL (01-14-20 @ 07:29)  Prealbumin, Serum: 13 mg/dL (01-07-20 @ 02:35)    Triglycerides      01-24-20 @ 07:01  -  01-25-20 @ 07:00  --------------------------------------------------------  IN: 3520 mL / OUT: 4475 mL / NET: -955 mL    01-25-20 @ 07:01  -  01-25-20 @ 08:32  --------------------------------------------------------  IN: 150 mL / OUT: 130 mL / NET: 20 mL        VITALS/PHYSICAL EXAM  --------------------------------------------------------------------------------  T(C): 36.6 (01-25-20 @ 07:00), Max: 37.6 (01-24-20 @ 15:00)  HR: 83 (01-25-20 @ 08:00) (76 - 102)  BP: 102/55 (01-25-20 @ 08:00) (91/55 - 169/79)  RR: 21 (01-25-20 @ 08:00) (14 - 38)  SpO2: 99% (01-25-20 @ 08:00) (90% - 100%)  Wt(kg): --    Physical Exam:  --------------------------------------------------------------------------------  	Gen: WD NAD, A&Ox3, NC O2  	HEENT: NC/AT, PERR              neck, trachea midline,              Chest:  decreased BS Lt base; Lt chest w/ CT x1 to water seal  	Abd: softly distended, non tender, midline incision c/d/i w/o drainage                   (+)ostomy pink viable- mustard like stool              MSK: FROM x4, Equally  BUE w/o edema, clubbing & cyanosis, Warm, (+)BLE mild edema, no clubbing, cyanosis,                  RUE PICC w/o sx infection   	Neuro: No focal deficits, intact sensation  	Psych: Normal affect and mood [facility] NUTRITION SUPPORT--  Attending/ PA FOLLOW UP NOTE      24 hour events/subjective: Tolerating TPN without issues denies palpitations, chest pain, shortness of breath. Denies nausea nor vomiting nor abdominal pain. Tolerating soft mechanical diet with high ostomy output    24 HOUR EVENTS:  - 2mcg of DDAVP administered for polyuria to good effect.   - Nephrology consulted for polyuria; recommended discontinuing ostomy repletions which were then D/C.     PAST HISTORY  --------------------------------------------------------------------------------  No significant changes to PMH, PSH, FHx, SHx, unless otherwise noted    ALLERGIES & MEDICATIONS  --------------------------------------------------------------------------------  Allergies    IV Contrast (Hives)    Intolerances      Standing Inpatient Medications  albuterol/ipratropium for Nebulization. 3 milliLiter(s) Nebulizer every 6 hours  buDESOnide    Inhalation Suspension 0.5 milliGRAM(s) Inhalation every 12 hours  chlorhexidine 2% Cloths 1 Application(s) Topical <User Schedule>  diphenoxylate/atropine 2 Tablet(s) Oral <User Schedule>  enoxaparin Injectable 40 milliGRAM(s) SubCutaneous daily  ergocalciferol 55309 Unit(s) Oral every week  fat emulsion (Fish Oil and Plant Based) 20% Infusion 12.5 mL/Hr IV Continuous <Continuous>  insulin lispro (HumaLOG) corrective regimen sliding scale   SubCutaneous every 6 hours  loperamide 16 milliGRAM(s) Oral <User Schedule>  meropenem  IVPB 1000 milliGRAM(s) IV Intermittent every 8 hours  octreotide  Injectable 100 MICROGram(s) SubCutaneous three times a day  opium Tincture 6 milliGRAM(s) Oral <User Schedule>  pantoprazole    Tablet 40 milliGRAM(s) Oral before breakfast  Parenteral Nutrition - Adult 1 Each TPN Continuous <Continuous>  psyllium Powder 1 Packet(s) Oral three times a day  sodium chloride 3 Gram(s) Oral every 8 hours  tamsulosin 0.4 milliGRAM(s) Oral daily    PRN Inpatient Medications  acetaminophen   Tablet .. 975 milliGRAM(s) Oral every 6 hours PRN  ondansetron Injectable 4 milliGRAM(s) IV Push every 6 hours PRN      REVIEW OF SYSTEMS  --------------------------------------------------------------------------------  Gen: as per HPI  Skin: No rashes  Head/Eyes/Ears/Mouth: No headache;No sore throat  Respiratory: No dyspnea, cough,   CV: No chest pain, PND, orthopnea  GI: as per HPI  : No increased frequency, dysuria, hematuria, nocturia  MSK: No joint pain/swelling; no back pain; no edema  Neuro: No dizziness/lightheadedness, weakness, seizures, numbness, tingling  Psych: No significant nervousness, anxiety, stress, depression    All other systems were reviewed and are negative, except as noted.      LABS/STUDIES  --------------------------------------------------------------------------------              7.5    7.42  >-----------<  307      [01-25-20 @ 03:51]              24.1     139  |  97  |  17  ----------------------------<  108      [01-25-20 @ 03:51]  4.6   |  32  |  0.41        Ca     8.0     [01-25-20 @ 03:51]      iCa    1.13     [01-25 @ 05:02]      Mg     1.9     [01-25-20 @ 03:51]      Phos  2.7     [01-25-20 @ 03:51]    TPro  5.6  /  Alb  2.3  /  TBili  0.3  /  DBili  0.1  /  AST  42  /  ALT  47  /  AlkPhos  83  [01-25-20 @ 03:51]            Glucose, Serum: 108 mg/dL (01-25-20 @ 03:51)    PrealbuminPrealbumin, Serum: 16 mg/dL (01-24-20 @ 02:53)  Prealbumin, Serum: 13 mg/dL (01-21-20 @ 02:00)  Prealbumin, Serum: 15 mg/dL (01-15-20 @ 09:01)  Prealbumin, Serum: 13 mg/dL (01-14-20 @ 07:29)  Prealbumin, Serum: 13 mg/dL (01-07-20 @ 02:35)    Triglycerides      01-24-20 @ 07:01  -  01-25-20 @ 07:00  --------------------------------------------------------  IN: 3520 mL / OUT: 4475 mL / NET: -955 mL    01-25-20 @ 07:01  -  01-25-20 @ 08:32  --------------------------------------------------------  IN: 150 mL / OUT: 130 mL / NET: 20 mL        VITALS/PHYSICAL EXAM  --------------------------------------------------------------------------------  T(C): 36.6 (01-25-20 @ 07:00), Max: 37.6 (01-24-20 @ 15:00)  HR: 83 (01-25-20 @ 08:00) (76 - 102)  BP: 102/55 (01-25-20 @ 08:00) (91/55 - 169/79)  RR: 21 (01-25-20 @ 08:00) (14 - 38)  SpO2: 99% (01-25-20 @ 08:00) (90% - 100%)  Wt(kg): --    Physical Exam:  --------------------------------------------------------------------------------  	Gen: WD NAD, A&Ox3, NC O2  	HEENT: NC/AT, PERR              neck, trachea midline,              Chest:  decreased BS Lt base; Lt chest w/ CT x1 to water seal  	Abd: softly distended, non tender, midline incision c/d/i w/o drainage                   (+)ostomy pink viable- mustard like stool              MSK: FROM x4, Equally  BUE w/o edema, clubbing & cyanosis, Warm, (+)BLE mild edema, no clubbing, cyanosis,                  RUE PICC w/o sx infection   	Neuro: No focal deficits, intact sensation  	Psych: Normal affect and mood [facility] NUTRITION SUPPORT--  Attending/ PA FOLLOW UP NOTE      24 hour events/subjective: Tolerating TPN without issues denies palpitations, chest pain, shortness of breath. Denies nausea nor vomiting nor abdominal pain. Tolerating soft mechanical diet with high ostomy output. Reports having some muffins this AM without issues. Had difficulty sleeping overnight bc of mucous constantly behind his throat. Denies F/C/NS.      24 HOUR EVENTS:  - 2mcg of DDAVP administered for polyuria to good effect.   - Nephrology consulted for polyuria; recommended discontinuing ostomy repletions which were then D/C.     PAST HISTORY  --------------------------------------------------------------------------------  No significant changes to PMH, PSH, FHx, SHx, unless otherwise noted    ALLERGIES & MEDICATIONS  --------------------------------------------------------------------------------  Allergies    IV Contrast (Hives)    Intolerances      Standing Inpatient Medications  albuterol/ipratropium for Nebulization. 3 milliLiter(s) Nebulizer every 6 hours  buDESOnide    Inhalation Suspension 0.5 milliGRAM(s) Inhalation every 12 hours  chlorhexidine 2% Cloths 1 Application(s) Topical <User Schedule>  diphenoxylate/atropine 2 Tablet(s) Oral <User Schedule>  enoxaparin Injectable 40 milliGRAM(s) SubCutaneous daily  ergocalciferol 12247 Unit(s) Oral every week  fat emulsion (Fish Oil and Plant Based) 20% Infusion 12.5 mL/Hr IV Continuous <Continuous>  insulin lispro (HumaLOG) corrective regimen sliding scale   SubCutaneous every 6 hours  loperamide 16 milliGRAM(s) Oral <User Schedule>  meropenem  IVPB 1000 milliGRAM(s) IV Intermittent every 8 hours  octreotide  Injectable 100 MICROGram(s) SubCutaneous three times a day  opium Tincture 6 milliGRAM(s) Oral <User Schedule>  pantoprazole    Tablet 40 milliGRAM(s) Oral before breakfast  Parenteral Nutrition - Adult 1 Each TPN Continuous <Continuous>  psyllium Powder 1 Packet(s) Oral three times a day  sodium chloride 3 Gram(s) Oral every 8 hours  tamsulosin 0.4 milliGRAM(s) Oral daily    PRN Inpatient Medications  acetaminophen   Tablet .. 975 milliGRAM(s) Oral every 6 hours PRN  ondansetron Injectable 4 milliGRAM(s) IV Push every 6 hours PRN      REVIEW OF SYSTEMS  --------------------------------------------------------------------------------  Gen: as per HPI  Skin: No rashes  Head/Eyes/Ears/Mouth: No headache;No sore throat  Respiratory: No dyspnea, cough,   CV: No chest pain, PND, orthopnea  GI: as per HPI  : No increased frequency, dysuria, hematuria, nocturia  MSK: No joint pain/swelling; no back pain; no edema  Neuro: No dizziness/lightheadedness, weakness, seizures, numbness, tingling  Psych: No significant nervousness, anxiety, stress, depression    All other systems were reviewed and are negative, except as noted.      LABS/STUDIES  --------------------------------------------------------------------------------              7.5    7.42  >-----------<  307      [01-25-20 @ 03:51]              24.1     139  |  97  |  17  ----------------------------<  108      [01-25-20 @ 03:51]  4.6   |  32  |  0.41        Ca     8.0     [01-25-20 @ 03:51]      iCa    1.13     [01-25 @ 05:02]      Mg     1.9     [01-25-20 @ 03:51]      Phos  2.7     [01-25-20 @ 03:51]    TPro  5.6  /  Alb  2.3  /  TBili  0.3  /  DBili  0.1  /  AST  42  /  ALT  47  /  AlkPhos  83  [01-25-20 @ 03:51]            Glucose, Serum: 108 mg/dL (01-25-20 @ 03:51)    PrealbuminPrealbumin, Serum: 16 mg/dL (01-24-20 @ 02:53)  Prealbumin, Serum: 13 mg/dL (01-21-20 @ 02:00)  Prealbumin, Serum: 15 mg/dL (01-15-20 @ 09:01)  Prealbumin, Serum: 13 mg/dL (01-14-20 @ 07:29)  Prealbumin, Serum: 13 mg/dL (01-07-20 @ 02:35)    Triglycerides      01-24-20 @ 07:01  -  01-25-20 @ 07:00  --------------------------------------------------------  IN: 3520 mL / OUT: 4475 mL / NET: -955 mL    01-25-20 @ 07:01  -  01-25-20 @ 08:32  --------------------------------------------------------  IN: 150 mL / OUT: 130 mL / NET: 20 mL        VITALS/PHYSICAL EXAM  --------------------------------------------------------------------------------  T(C): 36.6 (01-25-20 @ 07:00), Max: 37.6 (01-24-20 @ 15:00)  HR: 83 (01-25-20 @ 08:00) (76 - 102)  BP: 102/55 (01-25-20 @ 08:00) (91/55 - 169/79)  RR: 21 (01-25-20 @ 08:00) (14 - 38)  SpO2: 99% (01-25-20 @ 08:00) (90% - 100%)  Wt(kg): --    Physical Exam:  --------------------------------------------------------------------------------  	Gen: WD NAD, A&Ox3, NC O2  	HEENT: NC/AT, PERR              neck, trachea midline,              Chest:  decreased BS Lt base; Lt chest w/ CT x1 to water seal  	Abd: softly distended, non tender, midline incision c/d/i w/o drainage                   (+)ostomy pink viable- mustard like stool              MSK: FROM x4, Equally  BUE w/o edema, clubbing & cyanosis, Warm, (+)BLE mild edema, no clubbing, cyanosis,                  RUE PICC w/o sx infection   	Neuro: No focal deficits, intact sensation  	Psych: Normal affect and mood [facility] NUTRITION SUPPORT--  Attending/ PA FOLLOW UP NOTE      24 hour events/subjective: Tolerating TPN without issues denies palpitations, chest pain, shortness of breath. Denies nausea nor vomiting nor abdominal pain. Tolerating soft mechanical diet with high ostomy output. Reports having some muffins this AM without issues. Had difficulty sleeping overnight bc of mucous constantly behind his throat. Denies F/C/NS.      24 HOUR EVENTS:  - 2mcg of DDAVP administered for polyuria to good effect.   - Nephrology consulted for polyuria; recommended discontinuing ostomy repletions which were then D/C.     PAST HISTORY  --------------------------------------------------------------------------------  No significant changes to PMH, PSH, FHx, SHx, unless otherwise noted    ALLERGIES & MEDICATIONS  --------------------------------------------------------------------------------  Allergies    IV Contrast (Hives)    Intolerances      Standing Inpatient Medications  albuterol/ipratropium for Nebulization. 3 milliLiter(s) Nebulizer every 6 hours  buDESOnide    Inhalation Suspension 0.5 milliGRAM(s) Inhalation every 12 hours  chlorhexidine 2% Cloths 1 Application(s) Topical <User Schedule>  diphenoxylate/atropine 2 Tablet(s) Oral <User Schedule>  enoxaparin Injectable 40 milliGRAM(s) SubCutaneous daily  ergocalciferol 35486 Unit(s) Oral every week  fat emulsion (Fish Oil and Plant Based) 20% Infusion 12.5 mL/Hr IV Continuous <Continuous>  insulin lispro (HumaLOG) corrective regimen sliding scale   SubCutaneous every 6 hours  loperamide 16 milliGRAM(s) Oral <User Schedule>  meropenem  IVPB 1000 milliGRAM(s) IV Intermittent every 8 hours  octreotide  Injectable 100 MICROGram(s) SubCutaneous three times a day  opium Tincture 6 milliGRAM(s) Oral <User Schedule>  pantoprazole    Tablet 40 milliGRAM(s) Oral before breakfast  Parenteral Nutrition - Adult 1 Each TPN Continuous <Continuous>  psyllium Powder 1 Packet(s) Oral three times a day  sodium chloride 3 Gram(s) Oral every 8 hours  tamsulosin 0.4 milliGRAM(s) Oral daily    PRN Inpatient Medications  acetaminophen   Tablet .. 975 milliGRAM(s) Oral every 6 hours PRN  ondansetron Injectable 4 milliGRAM(s) IV Push every 6 hours PRN      REVIEW OF SYSTEMS  --------------------------------------------------------------------------------  Gen: as per HPI  Skin: No rashes  Head/Eyes/Ears/Mouth: No headache;No sore throat  Respiratory: No dyspnea, cough,   CV: No chest pain, PND, orthopnea  GI: as per HPI  : No increased frequency, dysuria, hematuria, nocturia  MSK: No joint pain/swelling; no back pain; no edema  Neuro: No dizziness/lightheadedness, weakness, seizures, numbness, tingling  Psych: No significant nervousness, anxiety, stress, depression    All other systems were reviewed and are negative, except as noted.      LABS/STUDIES  --------------------------------------------------------------------------------              7.5    7.42  >-----------<  307      [01-25-20 @ 03:51]              24.1     139  |  97  |  17  ----------------------------<  108      [01-25-20 @ 03:51]  4.6   |  32  |  0.41        Ca     8.0     [01-25-20 @ 03:51]      iCa    1.13     [01-25 @ 05:02]      Mg     1.9     [01-25-20 @ 03:51]      Phos  2.7     [01-25-20 @ 03:51]    TPro  5.6  /  Alb  2.3  /  TBili  0.3  /  DBili  0.1  /  AST  42  /  ALT  47  /  AlkPhos  83  [01-25-20 @ 03:51]            Glucose, Serum: 108 mg/dL (01-25-20 @ 03:51)    PrealbuminPrealbumin, Serum: 16 mg/dL (01-24-20 @ 02:53)  Prealbumin, Serum: 13 mg/dL (01-21-20 @ 02:00)  Prealbumin, Serum: 15 mg/dL (01-15-20 @ 09:01)  Prealbumin, Serum: 13 mg/dL (01-14-20 @ 07:29)  Prealbumin, Serum: 13 mg/dL (01-07-20 @ 02:35)    Triglycerides      01-24-20 @ 07:01  -  01-25-20 @ 07:00  --------------------------------------------------------  IN: 3520 mL / OUT: 4475 mL / NET: -955 mL    01-25-20 @ 07:01  -  01-25-20 @ 08:32  --------------------------------------------------------  IN: 150 mL / OUT: 130 mL / NET: 20 mL        VITALS/PHYSICAL EXAM  --------------------------------------------------------------------------------  T(C): 36.6 (01-25-20 @ 07:00), Max: 37.6 (01-24-20 @ 15:00)  HR: 83 (01-25-20 @ 08:00) (76 - 102)  BP: 102/55 (01-25-20 @ 08:00) (91/55 - 169/79)  RR: 21 (01-25-20 @ 08:00) (14 - 38)  SpO2: 99% (01-25-20 @ 08:00) (90% - 100%)  Wt(kg): --    Physical Exam:  --------------------------------------------------------------------------------  	Gen: WD NAD, A&Ox3, NC O2  	HEENT: NC/AT, PERR              neck, trachea midline,              Chest:  decreased BS Lt base; Lt chest w/ CT x1 to water seal  	Abd: softly distended, non tender, midline incision c/d/i w/o drainage                   (+)ostomy pink viable- mustard like stool              MSK: FROM x4, Equally  BUE w/o edema, clubbing & cyanosis, Warm, (+)BLE mild edema, no clubbing, cyanosis,                  RUE PICC w/o sx infection   	Neuro: No focal deficits, intact sensation  	Psych: Normal affect and mood    ..  .  .

## 2020-01-25 NOTE — PROGRESS NOTE ADULT - SUBJECTIVE AND OBJECTIVE BOX
Subjective: Patient seen and examined. No new events except as noted.   remains in ICU   resting comfortably       REVIEW OF SYSTEMS:    CONSTITUTIONAL: + weakness, fevers or chills  EYES/ENT: No visual changes;  No vertigo or throat pain   NECK: No pain or stiffness  RESPIRATORY: No cough, wheezing, hemoptysis; No shortness of breath  CARDIOVASCULAR: No chest pain or palpitations  GASTROINTESTINAL: No abdominal or epigastric pain. No nausea, vomiting, or hematemesis; No diarrhea or constipation. No melena or hematochezia.  GENITOURINARY: No dysuria, frequency or hematuria  NEUROLOGICAL: No numbness or weakness  SKIN: No itching, burning, rashes, or lesions   All other review of systems is negative unless indicated above.    MEDICATIONS:  MEDICATIONS  (STANDING):  albuterol/ipratropium for Nebulization. 3 milliLiter(s) Nebulizer every 6 hours  buDESOnide    Inhalation Suspension 0.5 milliGRAM(s) Inhalation every 12 hours  chlorhexidine 2% Cloths 1 Application(s) Topical <User Schedule>  desmopressin Injectable 2 MICROGram(s) SubCutaneous once  diphenoxylate/atropine 2 Tablet(s) Oral <User Schedule>  enoxaparin Injectable 40 milliGRAM(s) SubCutaneous daily  ergocalciferol 90898 Unit(s) Oral every week  fat emulsion (Fish Oil and Plant Based) 20% Infusion 12.5 mL/Hr (12.5 mL/Hr) IV Continuous <Continuous>  insulin lispro (HumaLOG) corrective regimen sliding scale   SubCutaneous every 6 hours  loperamide 16 milliGRAM(s) Oral <User Schedule>  meropenem  IVPB 1000 milliGRAM(s) IV Intermittent every 8 hours  octreotide  Injectable 100 MICROGram(s) SubCutaneous three times a day  opium Tincture 6 milliGRAM(s) Oral <User Schedule>  pantoprazole    Tablet 40 milliGRAM(s) Oral before breakfast  Parenteral Nutrition - Adult 1 Each (50 mL/Hr) TPN Continuous <Continuous>  psyllium Powder 1 Packet(s) Oral three times a day  tamsulosin 0.4 milliGRAM(s) Oral daily      PHYSICAL EXAM:  T(C): 36.4 (01-25-20 @ 19:00), Max: 36.9 (01-24-20 @ 23:00)  HR: 88 (01-25-20 @ 20:00) (77 - 103)  BP: 83/48 (01-25-20 @ 20:00) (72/44 - 157/72)  RR: 23 (01-25-20 @ 20:00) (14 - 39)  SpO2: 95% (01-25-20 @ 20:00) (95% - 100%)  Wt(kg): --  I&O's Summary    24 Jan 2020 07:01  -  25 Jan 2020 07:00  --------------------------------------------------------  IN: 3520 mL / OUT: 4475 mL / NET: -955 mL    25 Jan 2020 07:01  -  25 Jan 2020 20:40  --------------------------------------------------------  IN: 1737.5 mL / OUT: 1815 mL / NET: -77.5 mL          Appearance: NAD   HEENT:   Dry oral mucosa, crusted lips   Lymphatic: No lymphadenopathy   Cardiovascular: Normal S1 S2, No JVD, No murmurs , Peripheral pulses palpable 2+ bilaterally  Respiratory: Decreased bs R sided + pigtail   Gastrointestinal:  Soft, NT, ND. Ostomy pink with output. Midline staples in place, midline incision site is c/d/i   Skin: No rashes, No ecchymoses, No cyanosis, warm to touch  Musculoskeletal: Decreased  range of motion and strength  Psychiatry:  mildly sedated   Ext: No edema +PICC line   +rosas     LABS:    CARDIAC MARKERS:                                7.5    7.42  )-----------( 307      ( 25 Jan 2020 03:51 )             24.1     01-25    139  |  97  |  17  ----------------------------<  108<H>  4.6   |  32<H>  |  0.41<L>    Ca    8.0<L>      25 Jan 2020 03:51  Phos  2.7     01-25  Mg     1.9     01-25    TPro  5.6<L>  /  Alb  2.3<L>  /  TBili  0.3  /  DBili  0.1  /  AST  42<H>  /  ALT  47<H>  /  AlkPhos  83  01-25    proBNP:   Lipid Profile:   HgA1c:   TSH:             TELEMETRY: 	SR    ECG:  	  RADIOLOGY:   DIAGNOSTIC TESTING:  [ ] Echocardiogram:  [ ]  Catheterization:  [ ] Stress Test:    OTHER:

## 2020-01-25 NOTE — PROGRESS NOTE ADULT - PROBLEM SELECTOR PLAN 1
s/p 1/10 Left VATS pleurx catheter placement since removed   s/p 1/15 L VATS evacuation of L hemothorax    left base pleural chest dc'd 1/22- CXR-stable  Left apical chest tube removed today, F/U CXR  Increase activity as tolerated, OOB to chair  Cough and deep breathe, Incentive Spirometry Q1h, Chest PT.   Thoracic surgery to sign off, reconsult as needed.  Please have patient follow up with Dr. Mayes in 1-2 weeks after discharge at Thoracic Surgery office at [facility]. Please call [phone] to schedule an appointment.  Continue management as per primary team

## 2020-01-25 NOTE — PROGRESS NOTE ADULT - ATTENDING COMMENTS
Dr. Carroll (Attending Physician)  Advising oob to chair ambulation as possible  left chest tube removed by thoracic this am  high output ostomy 1.8 liters out, malnourished on 1/2 dose tpn and feeds but likely not absorbing  polyuria - responded to dose of ddavp, unclear why he would have central di will check tsh, t4, cortisol and c/w ddavp 2 mcg bid  meropenem for pseudomonas pna last dose tomorrow

## 2020-01-25 NOTE — PROGRESS NOTE ADULT - ASSESSMENT
76 y/o M presenting with septic shock and high grade SBO s/p exploratory laparotomy, lysis of adhesions, decompression of bowel via enterotomy w/ primary repair, and Abthera VAC placement on 12/6; s/p take down of Abthera, washout and re-application of the Abthera vac on 12/8. 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 s/p L chest tube placement by IR 12/30 for pleural effusion now s/p VATS, with chest tube insertion for drainage of pleural effusion. RRT called 1/13 AM for hypoxia, patient transferred back to SICU.  Patient continued SOB, chest drain possible obstruction expanding. Patent taken back to OR emergently 1/15 for clot evac and VATS.     PLAN:  - Diet as tolerated  - OOB to chair  - Continue Lomotil, loperamide, tincture of opium, octreotide  - improving on meropenem, day 6 of 7  - Appreciate Nephrology recs  - Appreciate continued SICU care    [facility]  [id number]

## 2020-01-25 NOTE — PROGRESS NOTE ADULT - ASSESSMENT
73 y/o M presenting with septic shock and high grade SBO s/p exploratory laparotomy, lysis of adhesions, decompression of bowel via enterotomy w/ primary repair, and Abthera VAC placement on 12/6; s/p take down of Abthera, washout and re-application of the Abthera vac on 12/8. Now s/p SBR and end ileostomy on 12/10 acute respiratory distress now improving, Pneumothorax, hyperglycemia, delirium. Now still with persistent pneumothorax when chest pigtail clamped.   12/23 VSS on room air recommending IR drainage of left chest  12/24 No evidence of air leak to left chest tube. Tube clamped. Repeat chest cxr in 4 hours. Anticipate chest tube removal if lung remains expanded. Drainage of loculated left effusion discussed with IR. IR to reconsult for drainage once initial tube removed. Discussed plan with Dr Mayes and IR Fellow  12/24  On NC ,    VSSchest xray sm lt pl eff, left chest tube dc'd.  12/26    2l NC   co  sob,  lt side diminshed  12/27 VSS, CXR with unchanged loculated left pleural effusion- IR consulted for pigtail placement, states effusion can be drained via thoracentesis, pigtail placement not indicated at this time - Dr. Mayes to speak with IR attending.   12/30 Patient with persistent PTX  and left pleural effusion now for IR drainage with Dr Elkins.   12/31 HD stable, L PTC w/ high output, serosanguinous drainage, 990 ml overnight/ 2200 in 24 hrs. Tolerating 2L NC supplemental O2, Cyto pending, continue monitor drainage output.   1/1: LPTC still with High output-400/24h. Continue with pigtail cath drain.  1/ 2     lt pigtail draining  on lws  1/3 HD stable, left pigtail catheter continues to drain. Incentive spirometer encouraged, OOB and mobility, continue pulmonary toileting. Maintain chest tube to suction and monitor output.   1/4: Chest tube milked to prevent clogging of tube, Continues to drain with high out put.  1/5 remains with high out put 170/700. Continue drainage  1/6  persistent drainage from lt pl tube  1/7 Left pigtail 150/450. Daily CXR. Planning for pleurex cath placement on Friday 1/10 w/ Dr. Mayes  1/8        Lt pleural tube   persistent draining    1/9 preop for pleurX placement tomorrow. CT clamped. NPO after MN, type and cross x2.   1/10 s/p VATS L pleurx cath placement   1/11 HD stable, no resp distress, maintain L pleurex to suction overnight, CXR in AM.   1/12 pleurex waterseal  1/13   RRT  called for respiratory distress   plx to pleurvac   back to lws   min drainage  no a/l  1/14 VSS, Left pleurx catheter minimal drainage 10ml/24h, CXR: small left pleural effusion with left pleurx catheter in place.     1/15 Overnight episode of bradycardia and respiratory distress that stabilized with BiPap support, concerned for anemia requiring 2 U PRBC and increase in left pleural effusion, minimal drainage in pleurx.  1/15 s/p bronch, Left VATS. Evacuation of 2L blood from L chest, pleurX removal, chest ubes x2  1/16 HD stable, minimal vasopressors, CXR stable, currently SBT, CT x2 remains to suction, draining.   1/17 HD stable, CXR stable, will water seal chest tubes today, repeat CXR in AM. Cont to monitor drainage.   1/18: L apical chest tube with small AL. CXr appear stable tiny left apical PTX seen on cxr. Continue chest tube to water seal for now as d/w Dr. Mayes today.   1/19 Remains with high out put from left apical. Will continue both chest tubes for today as d/w Dr. Mayes/Lusi  1/20 Still with high output drainage remains serosanguenos. Continue drains for today  1/21  vss ,    2 chest tubes continue to drain  1/22 Left chest tube output: apex 270ml/24h and base 150ml/24h. CXR B/L small pleural effusions with bibasilar pna. Plan to d/c left base chest tube today as per Dr. Murray.  1/23 L angled/base tube removed last pm--CXR stble.  Left CT 80/24 hrs--no air leak.   Possible removed remaining CT in am  1/24 VSS, Left chest tube output 110ml/24h another 30ml since 7am. CXR no PTX, small right pleural effusion, Lt apical chest tube in place.  1/25 VSS, left apical chest tube output 150ml/24h, 10ml overnight - plan for removal this AM. F/U CXR. Thoracic surgery to sign off, please reconsult as needed.

## 2020-01-25 NOTE — PROGRESS NOTE ADULT - SUBJECTIVE AND OBJECTIVE BOX
GENERAL SURGERY PROGRESS NOTE    SUBJECTIVE/24 HOUR EVENTS:   - 2mcg of DDAVP administered for polyuria to good effect.   - Nephrology consulted for polyuria; recommended discontinuing ostomy repletions which were then D/C.   - Patient seen and examined. No overnight events.   - Patient feels well, tolerating diet, on TPN, oob/ambi, pain controlled, denies f/c/n/v/d/sob,  - Patient states having trouble sleeping because of coughing     PHYSICAL EXAM:  General: Appears well, NAD  Neuro: AAOx3  CHEST: Clear to auscultation bilaterally, drain in place to water seal  CV: Regular rate and rhythm  Abdomen: soft, nontender, nondistended, no rebound or guarding, ostomy pink, intact, draining semisolid  Extremities: Grossly symmetric  : rosas in place, clear yellow    V/S:  Vital Signs Last 24 Hrs  T(C): 36.6 (25 Jan 2020 07:00), Max: 37.6 (24 Jan 2020 15:00)  T(F): 97.9 (25 Jan 2020 07:00), Max: 99.7 (24 Jan 2020 15:00)  HR: 83 (25 Jan 2020 08:00) (76 - 102)  BP: 102/55 (25 Jan 2020 08:00) (91/55 - 169/79)  BP(mean): 72 (25 Jan 2020 08:00) (67 - 114)  RR: 21 (25 Jan 2020 08:00) (14 - 38)  SpO2: 99% (25 Jan 2020 08:00) (90% - 100%)    --------------------------------------------------------------------------------------------------  I/Os:    24 Jan 2020 07:01  -  25 Jan 2020 07:00  --------------------------------------------------------  IN:    fat emulsion (Fish Oil and Plant Based) 20% Infusion: 150 mL    Oral Fluid: 1920 mL    Solution: 100 mL    Solution: 150 mL    TPN (Total Parenteral Nutrition): 1200 mL  Total IN: 3520 mL    OUT:    Chest Tube: 150 mL    Ileostomy: 1800 mL    Indwelling Catheter - Urethral: 2525 mL  Total OUT: 4475 mL    Total NET: -955 mL      25 Jan 2020 07:01  -  25 Jan 2020 08:56  --------------------------------------------------------  IN:    Oral Fluid: 100 mL    TPN (Total Parenteral Nutrition): 50 mL  Total IN: 150 mL    OUT:    Indwelling Catheter - Urethral: 130 mL  Total OUT: 130 mL    Total NET: 20 mL        --------------------------------------------------------------------------------------------------  LABS:                        7.5    7.42  )-----------( 307      ( 25 Jan 2020 03:51 )             24.1     25 Jan 2020 03:51    139    |  97     |  17     ----------------------------<  108    4.6     |  32     |  0.41     Ca    8.0        25 Jan 2020 03:51  Phos  2.7       25 Jan 2020 03:51  Mg     1.9       25 Jan 2020 03:51    TPro  5.6    /  Alb  2.3    /  TBili  0.3    /  DBili  0.1    /  AST  42     /  ALT  47     /  AlkPhos  83     25 Jan 2020 03:51      CAPILLARY BLOOD GLUCOSE      POCT Blood Glucose.: 116 mg/dL (25 Jan 2020 05:36)  POCT Blood Glucose.: 144 mg/dL (25 Jan 2020 00:16)  POCT Blood Glucose.: 139 mg/dL (24 Jan 2020 16:59)  POCT Blood Glucose.: 158 mg/dL (24 Jan 2020 11:04)        LIVER FUNCTIONS - ( 25 Jan 2020 03:51 )  Alb: 2.3 g/dL / Pro: 5.6 g/dL / ALK PHOS: 83 U/L / ALT: 47 U/L / AST: 42 U/L / GGT: x               --------------------------------------------------------------------------------------------------  MEDICATIONS  (STANDING):  albuterol/ipratropium for Nebulization. 3 milliLiter(s) Nebulizer every 6 hours  buDESOnide    Inhalation Suspension 0.5 milliGRAM(s) Inhalation every 12 hours  chlorhexidine 2% Cloths 1 Application(s) Topical <User Schedule>  diphenoxylate/atropine 2 Tablet(s) Oral <User Schedule>  enoxaparin Injectable 40 milliGRAM(s) SubCutaneous daily  ergocalciferol 61904 Unit(s) Oral every week  fat emulsion (Fish Oil and Plant Based) 20% Infusion 12.5 mL/Hr (12.5 mL/Hr) IV Continuous <Continuous>  insulin lispro (HumaLOG) corrective regimen sliding scale   SubCutaneous every 6 hours  loperamide 16 milliGRAM(s) Oral <User Schedule>  meropenem  IVPB 1000 milliGRAM(s) IV Intermittent every 8 hours  octreotide  Injectable 100 MICROGram(s) SubCutaneous three times a day  opium Tincture 6 milliGRAM(s) Oral <User Schedule>  pantoprazole    Tablet 40 milliGRAM(s) Oral before breakfast  Parenteral Nutrition - Adult 1 Each (50 mL/Hr) TPN Continuous <Continuous>  psyllium Powder 1 Packet(s) Oral three times a day  sodium chloride 3 Gram(s) Oral every 8 hours  tamsulosin 0.4 milliGRAM(s) Oral daily    MEDICATIONS  (PRN):  acetaminophen   Tablet .. 975 milliGRAM(s) Oral every 6 hours PRN Mild Pain (1 - 3)  ondansetron Injectable 4 milliGRAM(s) IV Push every 6 hours PRN Nausea and/or Vomiting

## 2020-01-25 NOTE — PROGRESS NOTE ADULT - SUBJECTIVE AND OBJECTIVE BOX
HISTORY  75y Male past medical history of COPD and EtOH dependence who presented on 12/6/2019 with abdominal pain, nausea, hematemesis, and poor PO intake for ~2-3 days. In the ED, he was found to be hypotensive & tachycardic. Labs revealed an CHI and lactic acidosis. Imaging revealed a high grade SBO in the RLQ on CT scan. Hospital course is as follows:  12/06 - s/p exploratory laparotomy, lysis of adhesions, decompression of bowel via enterotomy w/ primary repair, and Abthera VAC placement as the distal 50% of the bowel appeared dusky but was still viable, admitted to SICU post-operatively as he was on vasopressor support and was left intubated  12/08 - s/p re-exploration, proximal 165 cm of small bowel appeared pink & viable but beyond that had patchy areas of ischemia so decision was made to give the bowel more time to demarcate before resecting in order to preserve as much small bowel as possible so Abthera VAC was replaced  12/09 - s/p re-exploration, small bowel resection of 150 cm (150 cm remaining), ileocecetomy, end ileostomy, mucous fistula, and abdominal closure  12/11 - extubated to BiPAP, noted to be in SVT that was rate controlled w/ metoprolol  12/14 - started on TPN  12/15 - noted to have spontaneous left pneumothorax s/p left pigtail catheter placement, noted to be in SVT again so amiodarone started, high ileostomy output noted so concern for short bowel syndrome  12/16 - amiodarone discontinued, started on beta blocker with metoprolol  12/18 - started Lomotil and ileostomy repletions with IV fluids  12/19 - started Imodium, left pigtail catheter was placed to water seal but pneumothorax noted on follow-up CXR so placed back to suction  12/20 - started tincture of opium, concern for aspiration so changed diet from regular to dysphagia 1 pureed with nectar-thickened liquids  12/22 - left pigtail catheter placed to water seal with no pneumothorax noted on follow-up CXR, CT chest with moderate left pleural effusion not being drained by pigtail catheter  12/23 - 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  12/24 - left pigtail catheter discontinued  12/28 - started Ancef for erythematous midline wound  12/29 - beta blocker discontinued for intermittent episodes of hypotension  12/30 - left pigtail catheter placement by IR with drainage of serous transudative fluid  01/02 - FEES demonstrating penetration with thin liquids so patient kept on dysphagia 1 pureed with nectar-thickened liquids  01/07 - Transferred to floors  01/10 - s/p left VATS w/ PleurX catheter placement  01/11 - evaluated by speech & swallow, advanced diet to mechanical soft with thin liquids  01/12 - PleurX catheter placed to water seal with on pneumothorax on follow-up CXR  01/13 - RRT for hypoxemia, placed on BiPAP, PleurX catheter placed back to suction, started vancomycin & Zosyn for possible HCAP  01/14 - noted to have minimal output from PleurX catheter, lung ultrasound with large left pleural effusion concerning for hemothorax, multiple episodes of bradycardia secondary to hypoxemia, remains on BiPAP  01/15 - worsening respiratory distress requiring intubation, hypotensive requiring vasopressor support, CT chest with large left hemothorax w/ trace pneumothorax & extensive subcutaneous emphysema so taken to OR emergently for left VATS, evacuation fo 2 L of clot, and placement of two left chest tubes  01/16 - extubated, weaned off vasopressor support  01/17 - two left chest tubes placed to water seal with no pneumothorax noted on follow-up CXR    24 HOUR EVENTS:  - 2mcg of DDAVP administered for polyuria to good effect.   - Nephrology consulted for polyuria; recommended discontinuing ostomy repletions which were then D/C.     SUBJECTIVE/ROS:  [ ] A ten-point review of systems was otherwise negative except as noted.  [ ] Due to altered mental status/intubation, subjective information were not able to be obtained from the patient. History was obtained, to the extent possible, from review of the chart and collateral sources of information.      NEURO  Exam: awake, alert, oriented  Meds: acetaminophen   Tablet .. 975 milliGRAM(s) Oral every 6 hours PRN Mild Pain (1 - 3)  ondansetron Injectable 4 milliGRAM(s) IV Push every 6 hours PRN Nausea and/or Vomiting  opium Tincture 6 milliGRAM(s) Oral <User Schedule>    [x] Adequacy of sedation and pain control has been assessed and adjusted      RESPIRATORY  RR: 24 (01-25-20 @ 04:00) (14 - 38)  SpO2: 95% (01-25-20 @ 04:00) (90% - 100%)  Exam: unlabored, clear to auscultation bilaterally  Mechanical Ventilation: none  [N/A] Extubation Readiness Assessed  Meds: albuterol/ipratropium for Nebulization. 3 milliLiter(s) Nebulizer every 6 hours  buDESOnide    Inhalation Suspension 0.5 milliGRAM(s) Inhalation every 12 hours        CARDIOVASCULAR  HR: 102 (01-25-20 @ 04:00) (75 - 102)  BP: 155/71 (01-25-20 @ 04:00) (91/55 - 169/79)  BP(mean): 102 (01-25-20 @ 04:00) (67 - 114)  Exam: regular rate and rhythm  Cardiac Rhythm: sinus  Perfusion     [x]Adequate   [ ]Inadequate  Mentation   [x]Normal       [ ]Reduced  Extremities  [x]Warm         [ ]Cool  Volume Status [ ]Hypervolemic [x]Euvolemic [ ]Hypovolemic  Meds: tamsulosin 0.4 milliGRAM(s) Oral daily        GI/NUTRITION  Exam: soft, nontender, nondistended  Diet: mechanical soft diet   Meds: diphenoxylate/atropine 2 Tablet(s) Oral <User Schedule>  loperamide 16 milliGRAM(s) Oral <User Schedule>  pantoprazole    Tablet 40 milliGRAM(s) Oral before breakfast  psyllium Powder 1 Packet(s) Oral three times a day      GENITOURINARY  I&O's Detail    01-23 @ 07:01 - 01-24 @ 07:00  --------------------------------------------------------  IN:    fat emulsion (Fish Oil and Plant Based) 20% Infusion: 162.5 mL    Oral Fluid: 1037 mL    sodium chloride 0.9%: 625 mL    Solution: 400 mL    TPN (Total Parenteral Nutrition): 1200 mL  Total IN: 3424.5 mL    OUT:    Chest Tube: 110 mL    Ileostomy: 2500 mL    Indwelling Catheter - Urethral: 4185 mL  Total OUT: 6795 mL    Total NET: -3370.5 mL      01-24 @ 07:01  -  01-25 @ 05:00  --------------------------------------------------------  IN:    fat emulsion (Fish Oil and Plant Based) 20% Infusion: 150 mL    Oral Fluid: 1920 mL    Solution: 100 mL    Solution: 100 mL    TPN (Total Parenteral Nutrition): 1050 mL  Total IN: 3320 mL    OUT:    Chest Tube: 140 mL    Ileostomy: 800 mL    Indwelling Catheter - Urethral: 2225 mL  Total OUT: 3165 mL    Total NET: 155 mL          01-25    139  |  97  |  17  ----------------------------<  108<H>  4.6   |  32<H>  |  0.41<L>    Ca    8.0<L>      25 Jan 2020 03:51  Phos  2.7     01-25  Mg     1.9     01-25    TPro  5.6<L>  /  Alb  2.3<L>  /  TBili  0.3  /  DBili  0.1  /  AST  42<H>  /  ALT  47<H>  /  AlkPhos  83  01-25    [x] Martinez catheter, indication: urinary retention   Meds: ergocalciferol 46280 Unit(s) Oral every week  fat emulsion (Fish Oil and Plant Based) 20% Infusion 12.5 mL/Hr IV Continuous <Continuous>  magnesium sulfate  IVPB 2 Gram(s) IV Intermittent once  Parenteral Nutrition - Adult 1 Each TPN Continuous <Continuous>  sodium chloride 3 Gram(s) Oral every 8 hours        HEMATOLOGIC  Meds: enoxaparin Injectable 40 milliGRAM(s) SubCutaneous daily    [x] VTE Prophylaxis                        7.5    7.42  )-----------( 307      ( 25 Jan 2020 03:51 )             24.1       Transfusion     [ ] PRBC   [ ] Platelets   [ ] FFP   [ ] Cryoprecipitate      INFECTIOUS DISEASES  WBC Count: 7.42 K/uL (01-25 @ 03:51)    RECENT CULTURES:    Meds: meropenem  IVPB 1000 milliGRAM(s) IV Intermittent every 8 hours        ENDOCRINE  CAPILLARY BLOOD GLUCOSE      POCT Blood Glucose.: 144 mg/dL (25 Jan 2020 00:16)  POCT Blood Glucose.: 139 mg/dL (24 Jan 2020 16:59)  POCT Blood Glucose.: 158 mg/dL (24 Jan 2020 11:04)  POCT Blood Glucose.: 133 mg/dL (24 Jan 2020 06:12)    Meds: insulin lispro (HumaLOG) corrective regimen sliding scale   SubCutaneous every 6 hours  octreotide  Injectable 100 MICROGram(s) SubCutaneous three times a day        ACCESS DEVICES:  [x] Peripheral IV  [ ] Central Venous Line	[ ] R	[ ] L	[ ] IJ	[ ] Fem	[ ] SC	Placed:   [ ] Arterial Line		[ ] R	[ ] L	[ ] Fem	[ ] Rad	[ ] Ax	Placed:   [ ] PICC:					[ ] Mediport  [ ] Urinary Catheter, Date Placed:   [x] Necessity of urinary, arterial, and venous catheters discussed    OTHER MEDICATIONS:  chlorhexidine 2% Cloths 1 Application(s) Topical <User Schedule>      CODE STATUS: full code       IMAGING: < from: CT Abdomen and Pelvis w/ IV Cont (01.15.20 @ 13:25) >  ABDOMEN AND PELVIS:    LIVER: Subcentimeter hypodense focus in the right hepatic lobe, too small to characterize, but unchanged.  BILE DUCTS: Normal caliber.  GALLBLADDER: Within normal limits.  SPLEEN: Within normal limits.  PANCREAS: Within normal limits.  ADRENALS: Within normal limits.  KIDNEYS/URETERS:Mild hydronephrosis bilaterally. Bilateral nonobstructing calculi.    BLADDER: Markedly distended urinary bladder.  REPRODUCTIVE ORGANS: Prostatectomy.    BOWEL: Right lower quadrant ileostomy. No bowel obstruction. Colonic diverticulosis.  PERITONEUM: Trace ascites. Multiple nonspecific peritoneal calcifications, unchanged. A small droplet of gas in the right anterior abdomen adjacent to the ileostomy (3:208 and 5:43) may be extraluminal.  VESSELS: Atherosclerotic changes.  RETROPERITONEUM/LYMPHNODES: No lymphadenopathy.    ABDOMINAL WALL: Diffuse anasarca. Postsurgical changes of the anterior abdominal wall. The previous reported fluid midline abdominal wall collection has resolved.   BONES: Degenerative changes in the spine.    IMPRESSION:     Loculated large left pleural effusion with areas of hyperattenuation likely secondary to hematoma. Interval increase in size of the loculated left pleural effusion since 12/30/2019. Left sided chest tube terminating in the pleural space. Trace left pneumothorax.     Extensive left subcutaneous emphysema. Hematoma is also noted along the left lateral chest wall.    Interval resolution of the midline anterior abdominal wall fluid collection.    Markedly distended urinary bladder with mild bilateral hydronephrosis.    A small droplet of gas in the right anterior abdomen adjacent to the ileostomy may be extraluminal. Repeat CT with oral contrast may be helpful for further evaluation.    < end of copied text >

## 2020-01-25 NOTE — PROGRESS NOTE ADULT - ASSESSMENT
75 year old male w/ PMH of COPD and EtOH dependence with PSH/o appy, prostate surgery, & spine surgery  septic shock and high grade SBO s/p exploratory laparotomy, lysis of adhesions, decompression of bowel via enterotomy w/ primary repair, and Abthera VAC placement on 12/6; s/p take down of Abthera, washout and re-application of the Abthera vac on 12/8. Now s/p SBR and end ileostomy on 12/10.   Pt s/p Lt Chest Pigtail for effusion in IR with persistent high output on 1/10/2020 pt had VATs & placement of Left PleurX catheter. Pt op pt developed hemothorax and Respiratory Distress.  Pt is s/p Lt chest Evacuation of 2L blood w/ placement of CT x2 on 1/15/2020 with persistent Lt Pleural Effusion. Pt remains in SICU w/ Persistent Lt Pleural Effusion, Rt Pleural Effusion vs PNA. Surgery plan to Reverse Ileostomy in February (8wks from original  surgery). TPN consulted to assist w/ management of pt's nutrition in pt w/ prolonged hospital course now tolerating diet but has high Ileostomy output.     - Cont TPN restarted at 1/2 Goal: AA 60g, CHO 140g, and Lipids 30g in 1200m  - Pt tolerating Soft Mechanical w/ Ensure & Prosource supplements, consider adding MTC oil 15mL              Calorie Count to finish             High Ostomy Output and Chest Tube output w/ suspected increased protein losses  - HypoCa- increased Ca in TPN to 12mEq- continue to monitor  - HypoPhos- improving w/ NaPhos 45mMol in TPN & will continue to monitor   - Decreased absorption of fat suggestive form fecal fat testing results  - Vit D levels low- Ergocalciferol supplements started  - Vit A low- consider Vit A  - Vit K INR/ PT near normal suggestive that it is being absorbed  - Vit E Alpha Tocopherol- normal & beta-gamma-Tocopherol - low - consider VIt E  High Urine Output- low probability of DI as Na normal, pt has also been diuresed and fluid restricted          Consider NaCl tabs 3g TID PO & stop ostomy fluid repletion  - Strict Intake and Output - replete as per SICU  - BLE Edema- will continue to monitor - decreased Na & volume in TPN given  - Leukocytosis- improving - Meropenum as per ID  - Hyperglycemia-improving, Fingersticks & ISS coverage - as per SICU  - Weights three times a week;  - Daily CMP, Mg, Ionized Ca, Phosphorus,    and Weekly Triglycerides and Pre-albumin  - Continue as per SICU/Surgery, will follow with you, D/w primary team    TPN team, pager [phone]  D/w Dr Chacko 75 year old male w/ PMH of COPD and EtOH dependence with PSH/o appy, prostate surgery, & spine surgery  septic shock and high grade SBO s/p exploratory laparotomy, lysis of adhesions, decompression of bowel via enterotomy w/ primary repair, and Abthera VAC placement on 12/6; s/p take down of Abthera, washout and re-application of the Abthera vac on 12/8. Now s/p SBR and end ileostomy on 12/10.   Pt s/p Lt Chest Pigtail for effusion in IR with persistent high output on 1/10/2020 pt had VATs & placement of Left PleurX catheter. Pt op pt developed hemothorax and Respiratory Distress.  Pt is s/p Lt chest Evacuation of 2L blood w/ placement of CT x2 on 1/15/2020 with persistent Lt Pleural Effusion. Pt remains in SICU w/ Persistent Lt Pleural Effusion, Rt Pleural Effusion vs PNA. Surgery plan to Reverse Ileostomy in February (8wks from original  surgery). TPN consulted to assist w/ management of pt's nutrition in pt w/ prolonged hospital course now tolerating diet but has high Ileostomy output.     - Cont TPN at 1/2 Goal: AA 60g, CHO 140g, and Lipids 30g in 1200ml  - Pt tolerating Soft Mechanical w/ Ensure & Prosource supplements, consider adding MTC oil 15mL   - High Ostomy Output and Chest Tube output w/ suspected increased protein losses  - HypoCa- increased Ca in TPN to 12mEq- continue to monitor  - HypoPhos- improving w/ NaPhos 45mMol in TPN & will continue to monitor   - Decreased absorption of fat suggestive form fecal fat testing results  - Vit D levels low- Ergocalciferol supplements started  - Vit A low- consider Vit A  - Vit K INR/ PT near normal suggestive that it is being absorbed  - Vit E Alpha Tocopherol- normal & beta-gamma-Tocopherol - low - consider VIt E  High Urine Output- low probability of DI as Na normal, pt has also been diuresed and fluid restricted          Consider NaCl tabs 3g TID PO & stop ostomy fluid repletion  - Strict Intake and Output - replete as per SICU  - BLE Edema- will continue to monitor - decreased Na & volume in TPN given  - Leukocytosis- improving - Meropenum as per ID  - Hyperglycemia-improving, Fingersticks & ISS coverage - as per SICU  - Weights three times a week;  - Daily CMP, Mg, Ionized Ca, Phosphorus,    and Weekly Triglycerides and Pre-albumin  - Continue as per SICU/Surgery, will follow with you, D/w primary team    TPN team, pager [phone]  D/w Dr Chacko 75 year old male w/ PMH of COPD and EtOH dependence with PSH/o appy, prostate surgery, & spine surgery  septic shock and high grade SBO s/p exploratory laparotomy, lysis of adhesions, decompression of bowel via enterotomy w/ primary repair, and Abthera VAC placement on 12/6; s/p take down of Abthera, washout and re-application of the Abthera vac on 12/8. Now s/p SBR and end ileostomy on 12/10.   Pt s/p Lt Chest Pigtail for effusion in IR with persistent high output on 1/10/2020 pt had VATs & placement of Left PleurX catheter. Pt op pt developed hemothorax and Respiratory Distress.  Pt is s/p Lt chest Evacuation of 2L blood w/ placement of CT x2 on 1/15/2020 with persistent Lt Pleural Effusion. Pt remains in SICU w/ Persistent Lt Pleural Effusion, Rt Pleural Effusion vs PNA. Surgery plan to Reverse Ileostomy in February (8wks from original  surgery). TPN consulted to assist w/ management of pt's nutrition in pt w/ prolonged hospital course now tolerating diet but has high Ileostomy output.     - Cont TPN at 1/2 Goal: AA 60g, CHO 140g, and Lipids 30g in 1200ml  - Pt tolerating Soft Mechanical w/ Ensure & Prosource supplements, consider adding MTC oil 15mL   - High Ostomy Output and Chest Tube output w/ suspected increased protein losses  - HypoCa- increased Ca in TPN to 12mEq sonce 1/24 continue to monitor, may decrease tomorrow if CA improved.  - HypoPhos- improving w/ NaPhos 45mMol in TPN & will continue to monitor   - Decreased absorption of fat suggestive form fecal fat testing results  - Vit D levels low- Ergocalciferol supplements started  - Vit A low- consider Vit A  - Vit K INR/ PT near normal suggestive that it is being absorbed  - Vit E Alpha Tocopherol- normal & beta-gamma-Tocopherol - low - consider VIt E  High Urine Output- low probability of DI as Na normal, pt has also been diuresed and fluid restricted          Consider NaCl tabs 3g TID PO, stop ostomy fluid repletion DC'd yesterday.  - Strict Intake and Output - replete as per SICU  - BLE Edema- will continue to monitor - decreased Na & volume in TPN given  - Leukocytosis- improving - Meropenum as per ID  - Hyperglycemia-improving, Fingersticks & ISS coverage - as per SICU  - Weights three times a week;  - Daily CMP, Mg, Ionized Ca, Phosphorus,    and Weekly Triglycerides and Pre-albumin  - Continue as per SICU/Surgery, will follow with you, D/w primary team    TPN team, pager [phone]  D/w Dr Chacko 75 year old male w/ PMH of COPD and EtOH dependence with PSH/o appy, prostate surgery, & spine surgery  septic shock and high grade SBO s/p exploratory laparotomy, lysis of adhesions, decompression of bowel via enterotomy w/ primary repair, and Abthera VAC placement on 12/6; s/p take down of Abthera, washout and re-application of the Abthera vac on 12/8. Now s/p SBR and end ileostomy on 12/10.   Pt s/p Lt Chest Pigtail for effusion in IR with persistent high output on 1/10/2020 pt had VATs & placement of Left PleurX catheter. Pt op pt developed hemothorax and Respiratory Distress.  Pt is s/p Lt chest Evacuation of 2L blood w/ placement of CT x2 on 1/15/2020 with persistent Lt Pleural Effusion. Pt remains in SICU w/ Persistent Lt Pleural Effusion, Rt Pleural Effusion vs PNA. Surgery plan to Reverse Ileostomy in February (8wks from original  surgery). TPN consulted to assist w/ management of pt's nutrition in pt w/ prolonged hospital course now tolerating diet but has high Ileostomy output.     - Cont TPN at 1/2 Goal: AA 60g, CHO 140g, and Lipids 30g in 1200ml  - Pt tolerating Soft Mechanical w/ Ensure & Prosource supplements, consider adding MTC oil 15mL   - HypoCa- increased Ca in TPN to 12mEq sonce 1/24 continue to monitor, may decrease tomorrow if CA improved.  - HypoPhos- improving w/ NaPhos 45mMol in TPN & will continue to monitor   - Decreased absorption of fat suggestive from fecal fat testing results  - Vit D levels low- Ergocalciferol supplements started  - Vit A low- consider Vit A  - Vit K INR/ PT near normal suggestive that it is being absorbed  - Vit E Alpha Tocopherol- normal & beta-gamma-Tocopherol - low - consider VIt E  - Strict Intake and Output - High Urine and Ostomy Output- NaCl tabs and Ostomy repletions DC'd.   - BLE Edema- pt fluid restricted with minimal amount of [Na] in TPN.  - Leukocytosis- improving - Meropenum as per ID  - Hyperglycemia-improving, Fingersticks & ISS coverage - as per SICU  - Weights three times a week;  - Daily CMP, Mg, Ionized Ca, Phosphorus,    and Weekly Triglycerides and Pre-albumin  - Continue as per SICU/Surgery, will follow with you, D/w primary team    TPN team, pager [phone]  D/w Dr Chacko and Dr. MU Siegel 75 year old male w/ PMH of COPD and EtOH dependence with PSH/o appy, prostate surgery, & spine surgery  septic shock and high grade SBO s/p exploratory laparotomy, lysis of adhesions, decompression of bowel via enterotomy w/ primary repair, and Abthera VAC placement on 12/6; s/p take down of Abthera, washout and re-application of the Abthera vac on 12/8. Now s/p SBR and end ileostomy on 12/10.   Pt s/p Lt Chest Pigtail for effusion in IR with persistent high output on 1/10/2020 pt had VATs & placement of Left PleurX catheter. Pt op pt developed hemothorax and Respiratory Distress.  Pt is s/p Lt chest Evacuation of 2L blood w/ placement of CT x2 on 1/15/2020 with persistent Lt Pleural Effusion. Pt remains in SICU w/ Persistent Lt Pleural Effusion, Rt Pleural Effusion vs PNA. Surgery plan to Reverse Ileostomy in February (8wks from original  surgery). TPN consulted to assist w/ management of pt's nutrition in pt w/ prolonged hospital course now tolerating diet but has high Ileostomy output.     - Cont TPN at 1/2 Goal: AA 60g, CHO 140g, and Lipids 30g in 1200ml  - Pt tolerating Soft Mechanical w/ Ensure & Prosource supplements, consider adding MTC oil 15mL   - h/o HypoCa- increased Ca in TPN to 12mEq since 1/24 continue to monitor, may decrease tomorrow if Ca improved.  - h/o HypoPhos- improving w/ NaPhos 45mMol in TPN & will continue to monitor   - differential dx include Short gut w/u: Decreased absorption of fat suggestive from fecal fat testing results.  - Supplements: start Vit A and Vit E, based on low serum results 1/19  - Strict Intake and Output - Increased protein losses: possibly due to High Ostomy Output and Chest Tube output (2nd CT removed yesterday). Renal consulted for High Urine Output- low probability of DI as Na normal, pt has also been diuresed and fluid restricted;  Salt tabs DC'd, and ostomy fluid repletion DC'd. Pt s/p DDAVP.  - BLE Edema- will continue to monitor - decreased Na & volume in TPN given  - h/o Leukocytosis- improving - Meropenum as per ID  - h/o Hyperglycemia-improving, Fingersticks & ISS coverage - as per SICU  - Weights three times a week;  - Daily CMP, Mg, Ionized Ca, Phosphorus,    and Weekly Triglycerides and Pre-albumin  - Continue as per SICU/Surgery, will follow with you, D/w primary team    TPN team, pager [phone]  D/w Dr Chacko and Dr. MU Siegel

## 2020-01-25 NOTE — PROGRESS NOTE ADULT - ASSESSMENT
73 y/o male presenting with high grade SBO s/p exploratory laparotomy, lysis of adhesions, decompression of bowel via enterotomy w/ primary repair, & Abthera VAC placement (12/06/2019); RTOR for re-exploration w/ Abthera VAC replacement (12/08/2019) to allow for demarcation of ischemic small bowel; RTOR for re-exploration, small bowel resection of 150 cm (150 cm remaining), ileocecetomy, end ileostomy, mucous fistula, and abdominal closure (12/10/2019); hospital course complicated by SVT, short bowel syndrome requiring repletions w/ IV fluids, malnutrition requiring TPN, intermittent episodes of hypotension, spontaneous left pneumothorax s/p pigtail catheter (12/15/2019-12/24/2019), left pleural effusion s/p pigtail catheter w/ IR (12/30/2019), dysphagia, and oral HSV lesions, placement of Pleurx on 1/10, now re-admitted to SICU with acute hypoxic respiratory failure s/p repeat VATS with 2 liter hemothorax removed. Respiratory failure resolved, now stable in SICU.    Neuro: no acute issues  - Pain control as needed with acetaminophen    Resp: COPD, spontaneous left pneumothorax s/p pigtail catheter, left pleural effusion s/p Pleurx 1/10.  Intubated for respiratory distress s/p VATS 01/15 with 2 Liter of blood evacuation   - Cont chest tube x 1 to waterseal  - Pulmicort and Duoneb for COPD  - Will need outpatient follow-up with a pulmonologist for his COPD as patient does not currently have one  - Follow up CXR    CV: Bradycardia, SVT.  - No interval dysrhythmias   - Monitor vital signs    GI: s/p exploratory laparotomy, [place]recia, decompression of bowel via enterotomy w/ primary repair, & Abthera VAC placement (12/06/2019); re-exploration w/ ABthera VAC replacement (12/08/2019); re-exploration, small bowel resection of 150 cm (150 cm remaining), ileocecectomy end ileostomy, mucous fistula, and abdominal closure (12/10/2019); short bowel syndrome, malnutrition, dysphagia  - Dysphagia diet   - Monitor ostomy output 1.8L past 24hr  - Continue Lomotil, tincture of opium, loperamide, and octreotide for short bowel syndrome  - Protonix to decrease gastric secretions      Renal: no acute issues  - Monitor I&Os  - Monitor electrolytes and replete as necessary  - rosas catheter in place    Heme: anemia likely secondary to malnutrition / malabsorption  - Monitor CBC and coags  - Q12 CBCs   - Lovenox for VTE prophylaxis    ID: oral HSV lesions (s/p acyclovir 12/23/2019-01/02/2020)  - Combicath 1/16 with pseudomonas resistant to Zosyn  - Blood cx 1/16 NGTD.   - Meropenem    Endo: no acute issues  - Monitor glucose before meals & at bedtime while on TPN  - Insulin sliding scale.    Disposition:  - Will remain in SICU 75 y/o male presenting with high grade SBO s/p exploratory laparotomy, lysis of adhesions, decompression of bowel via enterotomy w/ primary repair, & Abthera VAC placement (12/06/2019); RTOR for re-exploration w/ Abthera VAC replacement (12/08/2019) to allow for demarcation of ischemic small bowel; RTOR for re-exploration, small bowel resection of 150 cm (150 cm remaining), ileocecetomy, end ileostomy, mucous fistula, and abdominal closure (12/10/2019); hospital course complicated by SVT, short bowel syndrome requiring repletions w/ IV fluids, malnutrition requiring TPN, intermittent episodes of hypotension, spontaneous left pneumothorax s/p pigtail catheter (12/15/2019-12/24/2019), left pleural effusion s/p pigtail catheter w/ IR (12/30/2019), dysphagia, and oral HSV lesions, placement of Pleurx on 1/10, now re-admitted to SICU with acute hypoxic respiratory failure s/p repeat VATS with 2 liter hemothorax removed. Respiratory failure resolved, now stable in SICU.    Neuro: no acute issues  - Pain control as needed with acetaminophen    Resp: COPD, spontaneous left pneumothorax s/p pigtail catheter, left pleural effusion s/p Pleurx 1/10.  Intubated for respiratory distress s/p VATS 01/15 with 2 Liter of blood evacuation   - Cont chest tube x 1 to waterseal  - Pulmicort and Duoneb for COPD  - Will need outpatient follow-up with a pulmonologist for his COPD as patient does not currently have one  - Follow up CXR    CV: Bradycardia, SVT.  - No interval dysrhythmias   - Monitor vital signs    GI: s/p exploratory laparotomy, Grecia, decompression of bowel via enterotomy w/ primary repair, & Abthera VAC placement (12/06/2019); re-exploration w/ ABthera VAC replacement (12/08/2019); re-exploration, small bowel resection of 150 cm (150 cm remaining), ileocecectomy end ileostomy, mucous fistula, and abdominal closure (12/10/2019); short bowel syndrome, malnutrition, dysphagia  - Dysphagia diet + TPN  - Monitor ostomy output  - Continue Lomotil, tincture of opium, loperamide, and octreotide for short bowel syndrome  - Protonix to decrease gastric secretions      Renal: no acute issues  - s/p DDAVP x 2 per Nephrology  - Monitor I&Os  - Monitor electrolytes and replete as necessary  - rosas catheter in place    Heme: anemia likely secondary to malnutrition / malabsorption  - Monitor CBC and coags  - Q12 CBCs   - Lovenox for VTE prophylaxis    ID: oral HSV lesions (s/p acyclovir 12/23/2019-01/02/2020)  - Combicath 1/16 with pseudomonas resistant to Zosyn  - Blood cx 1/16 NGTD.   - Meropenem    Endo: no acute issues  - Monitor glucose before meals & at bedtime while on TPN  - Insulin sliding scale.    Disposition:  - Will remain in SICU

## 2020-01-26 DIAGNOSIS — I95.9 HYPOTENSION, UNSPECIFIED: ICD-10-CM

## 2020-01-26 LAB
ALBUMIN SERPL ELPH-MCNC: 2.1 G/DL — LOW (ref 3.3–5)
ALP SERPL-CCNC: 72 U/L — SIGNIFICANT CHANGE UP (ref 40–120)
ALT FLD-CCNC: 40 U/L — SIGNIFICANT CHANGE UP (ref 10–45)
ANION GAP SERPL CALC-SCNC: 9 MMOL/L — SIGNIFICANT CHANGE UP (ref 5–17)
APPEARANCE UR: ABNORMAL
AST SERPL-CCNC: 31 U/L — SIGNIFICANT CHANGE UP (ref 10–40)
BILIRUB DIRECT SERPL-MCNC: <0.1 MG/DL — SIGNIFICANT CHANGE UP (ref 0–0.2)
BILIRUB INDIRECT FLD-MCNC: >0.1 MG/DL — LOW (ref 0.2–1)
BILIRUB SERPL-MCNC: 0.2 MG/DL — SIGNIFICANT CHANGE UP (ref 0.2–1.2)
BILIRUB UR-MCNC: NEGATIVE — SIGNIFICANT CHANGE UP
BLD GP AB SCN SERPL QL: NEGATIVE — SIGNIFICANT CHANGE UP
BUN SERPL-MCNC: 23 MG/DL — SIGNIFICANT CHANGE UP (ref 7–23)
CALCIUM SERPL-MCNC: 7.8 MG/DL — LOW (ref 8.4–10.5)
CHLORIDE SERPL-SCNC: 98 MMOL/L — SIGNIFICANT CHANGE UP (ref 96–108)
CK MB CFR SERPL CALC: 4.2 NG/ML — SIGNIFICANT CHANGE UP (ref 0–6.7)
CK SERPL-CCNC: 31 U/L — SIGNIFICANT CHANGE UP (ref 30–200)
CO2 SERPL-SCNC: 31 MMOL/L — SIGNIFICANT CHANGE UP (ref 22–31)
COLOR SPEC: ABNORMAL
CORTIS AM PEAK SERPL-MCNC: 5.9 UG/DL — LOW (ref 6–18.4)
CREAT SERPL-MCNC: 0.44 MG/DL — LOW (ref 0.5–1.3)
DIFF PNL FLD: ABNORMAL
GAS PNL BLDV: SIGNIFICANT CHANGE UP
GLUCOSE BLDC GLUCOMTR-MCNC: 107 MG/DL — HIGH (ref 70–99)
GLUCOSE BLDC GLUCOMTR-MCNC: 120 MG/DL — HIGH (ref 70–99)
GLUCOSE BLDC GLUCOMTR-MCNC: 134 MG/DL — HIGH (ref 70–99)
GLUCOSE BLDC GLUCOMTR-MCNC: 134 MG/DL — HIGH (ref 70–99)
GLUCOSE BLDC GLUCOMTR-MCNC: 148 MG/DL — HIGH (ref 70–99)
GLUCOSE SERPL-MCNC: 148 MG/DL — HIGH (ref 70–99)
GLUCOSE UR QL: NEGATIVE — SIGNIFICANT CHANGE UP
HCT VFR BLD CALC: 21.4 % — LOW (ref 39–50)
HCT VFR BLD CALC: 23.4 % — LOW (ref 39–50)
HGB BLD-MCNC: 6.7 G/DL — CRITICAL LOW (ref 13–17)
HGB BLD-MCNC: 7.4 G/DL — LOW (ref 13–17)
KETONES UR-MCNC: SIGNIFICANT CHANGE UP
LEUKOCYTE ESTERASE UR-ACNC: NEGATIVE — SIGNIFICANT CHANGE UP
MAGNESIUM SERPL-MCNC: 2.2 MG/DL — SIGNIFICANT CHANGE UP (ref 1.6–2.6)
MCHC RBC-ENTMCNC: 30.8 PG — SIGNIFICANT CHANGE UP (ref 27–34)
MCHC RBC-ENTMCNC: 31.3 GM/DL — LOW (ref 32–36)
MCHC RBC-ENTMCNC: 31.3 PG — SIGNIFICANT CHANGE UP (ref 27–34)
MCHC RBC-ENTMCNC: 31.6 GM/DL — LOW (ref 32–36)
MCV RBC AUTO: 100 FL — SIGNIFICANT CHANGE UP (ref 80–100)
MCV RBC AUTO: 97.5 FL — SIGNIFICANT CHANGE UP (ref 80–100)
NITRITE UR-MCNC: NEGATIVE — SIGNIFICANT CHANGE UP
NRBC # BLD: 0 /100 WBCS — SIGNIFICANT CHANGE UP (ref 0–0)
NRBC # BLD: 0 /100 WBCS — SIGNIFICANT CHANGE UP (ref 0–0)
PH UR: 6 — SIGNIFICANT CHANGE UP (ref 5–8)
PHOSPHATE SERPL-MCNC: 3.4 MG/DL — SIGNIFICANT CHANGE UP (ref 2.5–4.5)
PLATELET # BLD AUTO: 258 K/UL — SIGNIFICANT CHANGE UP (ref 150–400)
PLATELET # BLD AUTO: 262 K/UL — SIGNIFICANT CHANGE UP (ref 150–400)
POTASSIUM SERPL-MCNC: 4.9 MMOL/L — SIGNIFICANT CHANGE UP (ref 3.5–5.3)
POTASSIUM SERPL-SCNC: 4.9 MMOL/L — SIGNIFICANT CHANGE UP (ref 3.5–5.3)
PROCALCITONIN SERPL-MCNC: 0.1 NG/ML — SIGNIFICANT CHANGE UP (ref 0.02–0.1)
PROT SERPL-MCNC: 5.3 G/DL — LOW (ref 6–8.3)
PROT UR-MCNC: ABNORMAL
RBC # BLD: 2.14 M/UL — LOW (ref 4.2–5.8)
RBC # BLD: 2.4 M/UL — LOW (ref 4.2–5.8)
RBC # FLD: 14.6 % — HIGH (ref 10.3–14.5)
RBC # FLD: 16 % — HIGH (ref 10.3–14.5)
RH IG SCN BLD-IMP: POSITIVE — SIGNIFICANT CHANGE UP
SODIUM SERPL-SCNC: 138 MMOL/L — SIGNIFICANT CHANGE UP (ref 135–145)
SP GR SPEC: 1.03 — HIGH (ref 1.01–1.02)
T4 FREE SERPL-MCNC: 1.2 NG/DL — SIGNIFICANT CHANGE UP (ref 0.9–1.8)
TROPONIN T, HIGH SENSITIVITY RESULT: 52 NG/L — HIGH (ref 0–51)
TROPONIN T, HIGH SENSITIVITY RESULT: 58 NG/L — HIGH (ref 0–51)
TROPONIN T, HIGH SENSITIVITY RESULT: 62 NG/L — HIGH (ref 0–51)
TROPONIN T, HIGH SENSITIVITY RESULT: 66 NG/L — HIGH (ref 0–51)
TSH SERPL-MCNC: 1.82 UIU/ML — SIGNIFICANT CHANGE UP (ref 0.27–4.2)
UROBILINOGEN FLD QL: NEGATIVE — SIGNIFICANT CHANGE UP
WBC # BLD: 7.42 K/UL — SIGNIFICANT CHANGE UP (ref 3.8–10.5)
WBC # BLD: 8.37 K/UL — SIGNIFICANT CHANGE UP (ref 3.8–10.5)
WBC # FLD AUTO: 7.42 K/UL — SIGNIFICANT CHANGE UP (ref 3.8–10.5)
WBC # FLD AUTO: 8.37 K/UL — SIGNIFICANT CHANGE UP (ref 3.8–10.5)

## 2020-01-26 PROCEDURE — 99233 SBSQ HOSP IP/OBS HIGH 50: CPT | Mod: GC

## 2020-01-26 PROCEDURE — 99232 SBSQ HOSP IP/OBS MODERATE 35: CPT

## 2020-01-26 PROCEDURE — 93306 TTE W/DOPPLER COMPLETE: CPT | Mod: 26

## 2020-01-26 PROCEDURE — 71045 X-RAY EXAM CHEST 1 VIEW: CPT | Mod: 26

## 2020-01-26 RX ORDER — COSYNTROPIN 0.25 MG/ML
0.25 INJECTION, SOLUTION INTRAVENOUS ONCE
Refills: 0 | Status: COMPLETED | OUTPATIENT
Start: 2020-01-26 | End: 2020-01-26

## 2020-01-26 RX ORDER — I.V. FAT EMULSION 20 G/100ML
12.5 EMULSION INTRAVENOUS
Qty: 30 | Refills: 0 | Status: DISCONTINUED | OUTPATIENT
Start: 2020-01-26 | End: 2020-01-27

## 2020-01-26 RX ORDER — ELECTROLYTE SOLUTION,INJ
1 VIAL (ML) INTRAVENOUS
Refills: 0 | Status: DISCONTINUED | OUTPATIENT
Start: 2020-01-26 | End: 2020-01-26

## 2020-01-26 RX ADMIN — Medication 1 EACH: at 18:21

## 2020-01-26 RX ADMIN — Medication 1 PACKET(S): at 05:31

## 2020-01-26 RX ADMIN — Medication 2 TABLET(S): at 12:21

## 2020-01-26 RX ADMIN — Medication 2 TABLET(S): at 07:54

## 2020-01-26 RX ADMIN — MORPHINE 6 MILLIGRAM(S): 10 SOLUTION ORAL at 18:19

## 2020-01-26 RX ADMIN — Medication 3 MILLILITER(S): at 12:00

## 2020-01-26 RX ADMIN — Medication 1 PACKET(S): at 14:29

## 2020-01-26 RX ADMIN — Medication 16 MILLIGRAM(S): at 07:55

## 2020-01-26 RX ADMIN — COSYNTROPIN 0.25 MILLIGRAM(S): 0.25 INJECTION, SOLUTION INTRAVENOUS at 21:00

## 2020-01-26 RX ADMIN — Medication 3 MILLILITER(S): at 18:50

## 2020-01-26 RX ADMIN — Medication 2 TABLET(S): at 18:20

## 2020-01-26 RX ADMIN — MORPHINE 6 MILLIGRAM(S): 10 SOLUTION ORAL at 12:21

## 2020-01-26 RX ADMIN — MORPHINE 6 MILLIGRAM(S): 10 SOLUTION ORAL at 23:04

## 2020-01-26 RX ADMIN — Medication 0.5 MILLIGRAM(S): at 18:50

## 2020-01-26 RX ADMIN — Medication 16 MILLIGRAM(S): at 18:20

## 2020-01-26 RX ADMIN — PANTOPRAZOLE SODIUM 40 MILLIGRAM(S): 20 TABLET, DELAYED RELEASE ORAL at 07:54

## 2020-01-26 RX ADMIN — Medication 16 MILLIGRAM(S): at 23:04

## 2020-01-26 RX ADMIN — I.V. FAT EMULSION 12.5 ML/HR: 20 EMULSION INTRAVENOUS at 18:21

## 2020-01-26 RX ADMIN — Medication 16 MILLIGRAM(S): at 12:22

## 2020-01-26 RX ADMIN — MEROPENEM 100 MILLIGRAM(S): 1 INJECTION INTRAVENOUS at 05:31

## 2020-01-26 RX ADMIN — Medication 2 TABLET(S): at 23:03

## 2020-01-26 RX ADMIN — Medication 3 MILLILITER(S): at 00:21

## 2020-01-26 RX ADMIN — ENOXAPARIN SODIUM 40 MILLIGRAM(S): 100 INJECTION SUBCUTANEOUS at 12:17

## 2020-01-26 RX ADMIN — Medication 1 PACKET(S): at 23:04

## 2020-01-26 RX ADMIN — CHLORHEXIDINE GLUCONATE 1 APPLICATION(S): 213 SOLUTION TOPICAL at 05:31

## 2020-01-26 RX ADMIN — TAMSULOSIN HYDROCHLORIDE 0.4 MILLIGRAM(S): 0.4 CAPSULE ORAL at 12:18

## 2020-01-26 RX ADMIN — OCTREOTIDE ACETATE 100 MICROGRAM(S): 200 INJECTION, SOLUTION INTRAVENOUS; SUBCUTANEOUS at 23:03

## 2020-01-26 RX ADMIN — ERGOCALCIFEROL 50000 UNIT(S): 1.25 CAPSULE ORAL at 14:29

## 2020-01-26 RX ADMIN — MORPHINE 6 MILLIGRAM(S): 10 SOLUTION ORAL at 07:54

## 2020-01-26 RX ADMIN — OCTREOTIDE ACETATE 100 MICROGRAM(S): 200 INJECTION, SOLUTION INTRAVENOUS; SUBCUTANEOUS at 14:28

## 2020-01-26 RX ADMIN — OCTREOTIDE ACETATE 100 MICROGRAM(S): 200 INJECTION, SOLUTION INTRAVENOUS; SUBCUTANEOUS at 05:31

## 2020-01-26 NOTE — PROGRESS NOTE ADULT - SUBJECTIVE AND OBJECTIVE BOX
GENERAL SURGERY PROGRESS NOTE    SUBJECTIVE/24 HOUR EVENTS:   - DDAVP 2mcg x 2 given, w/ appropriate response  - 2nd chest tube discontinued by CT surgery team  - 500cc LR bolus x2 given for persistent hypotension to SBP of 70's  - discontinued salt tab  - bcx sent overnight, concern for beginning of sepsis  - drop in H/H, transfused 1u PRBC  - Patient seen and examined.   - Patient feels well, tolerating diet, on TPN, oob/ambi, pain controlled, denies f/c/n/v/d/sob,      PHYSICAL EXAM:  General: Appears well, NAD  Neuro: AAOx3  CHEST: Clear to auscultation bilaterally,  CV: Regular rate and rhythm  Abdomen: soft, nontender, nondistended, no rebound or guarding, ostomy pink, intact, draining semisolid  Extremities: Grossly symmetric  : rosas in place, clear yellow    V/S:  Vital Signs Last 24 Hrs  T(C): 36.9 (2020 03:00), Max: 36.9 (2020 03:00)  T(F): 98.5 (2020 03:00), Max: 98.5 (2020 03:00)  HR: 86 (2020 06:00) (70 - 103)  BP: 112/59 (2020 06:00) (72/44 - 125/60)  BP(mean): 82 (2020 06:00) (53 - 86)  RR: 23 (2020 06:00) (13 - 39)  SpO2: 97% (2020 06:00) (76% - 100%)    --------------------------------------------------------------------------------------------------  I/Os:    2020 07:01  -  2020 07:00  --------------------------------------------------------  IN:    fat emulsion (Fish Oil and Plant Based) 20% Infusion: 150 mL    Oral Fluid: 1920 mL    Solution: 100 mL    Solution: 150 mL    TPN (Total Parenteral Nutrition): 1200 mL  Total IN: 3520 mL    OUT:    Chest Tube: 150 mL    Ileostomy: 1800 mL    Indwelling Catheter - Urethral: 2525 mL  Total OUT: 4475 mL    Total NET: -955 mL      2020 07:01  -  2020 06:20  --------------------------------------------------------  IN:    fat emulsion (Fish Oil and Plant Based) 20% Infusion: 162.5 mL    Lactated Ringers IV Bolus: 1000 mL    Oral Fluid: 810 mL    Packed Red Blood Cells: 350 mL    Solution: 50 mL    TPN (Total Parenteral Nutrition): 1150 mL  Total IN: 3522.5 mL    OUT:    Ileostomy: 1470 mL    Indwelling Catheter - Urethral: 1715 mL  Total OUT: 3185 mL    Total NET: 337.5 mL        --------------------------------------------------------------------------------------------------  LABS:                        7.4    7.42  )-----------( 258      ( 2020 05:32 )             23.4     2020 01:35    138    |  98     |  23     ----------------------------<  148    4.9     |  31     |  0.44     Ca    7.8        2020 01:35  Phos  3.4       2020 01:35  Mg     2.2       2020 01:35    TPro  5.3    /  Alb  2.1    /  TBili  0.2    /  DBili  <0.1   /  AST  31     /  ALT  40     /  AlkPhos  72     2020 01:35      CAPILLARY BLOOD GLUCOSE      POCT Blood Glucose.: 120 mg/dL (2020 06:04)  POCT Blood Glucose.: 148 mg/dL (2020 01:11)  POCT Blood Glucose.: 120 mg/dL (2020 17:49)  POCT Blood Glucose.: 106 mg/dL (2020 12:52)    CARDIAC MARKERS ( 2020 02:08 )  x     / x     / 31 U/L / x     / 4.2 ng/mL      LIVER FUNCTIONS - ( 2020 01:35 )  Alb: 2.1 g/dL / Pro: 5.3 g/dL / ALK PHOS: 72 U/L / ALT: 40 U/L / AST: 31 U/L / GGT: x             Urinalysis Basic - ( 2020 00:05 )    Color: Light Orange / Appearance: Slightly Turbid / S.030 / pH: x  Gluc: x / Ketone: Trace  / Bili: Negative / Urobili: Negative   Blood: x / Protein: 30 mg/dL / Nitrite: Negative   Leuk Esterase: Negative / RBC: >50 /hpf / WBC 2 /HPF   Sq Epi: x / Non Sq Epi: 0 /hpf / Bacteria: Negative      --------------------------------------------------------------------------------------------------  MEDICATIONS  (STANDING):  albuterol/ipratropium for Nebulization. 3 milliLiter(s) Nebulizer every 6 hours  buDESOnide    Inhalation Suspension 0.5 milliGRAM(s) Inhalation every 12 hours  chlorhexidine 2% Cloths 1 Application(s) Topical <User Schedule>  diphenoxylate/atropine 2 Tablet(s) Oral <User Schedule>  enoxaparin Injectable 40 milliGRAM(s) SubCutaneous daily  ergocalciferol 39433 Unit(s) Oral every week  fat emulsion (Fish Oil and Plant Based) 20% Infusion 12.5 mL/Hr (12.5 mL/Hr) IV Continuous <Continuous>  insulin lispro (HumaLOG) corrective regimen sliding scale   SubCutaneous every 6 hours  loperamide 16 milliGRAM(s) Oral <User Schedule>  octreotide  Injectable 100 MICROGram(s) SubCutaneous three times a day  opium Tincture 6 milliGRAM(s) Oral <User Schedule>  pantoprazole    Tablet 40 milliGRAM(s) Oral before breakfast  Parenteral Nutrition - Adult 1 Each (50 mL/Hr) TPN Continuous <Continuous>  psyllium Powder 1 Packet(s) Oral three times a day  tamsulosin 0.4 milliGRAM(s) Oral daily    MEDICATIONS  (PRN):  acetaminophen   Tablet .. 975 milliGRAM(s) Oral every 6 hours PRN Mild Pain (1 - 3)  ondansetron Injectable 4 milliGRAM(s) IV Push every 6 hours PRN Nausea and/or Vomiting

## 2020-01-26 NOTE — PROGRESS NOTE ADULT - SUBJECTIVE AND OBJECTIVE BOX
SICU DAILY PROGRESS NOTE    75y Male past medical history of COPD and EtOH dependence who presented on 12/6/2019 with abdominal pain, nausea, hematemesis, and poor PO intake for ~2-3 days. In the ED, he was found to be hypotensive & tachycardic. Labs revealed an CHI and lactic acidosis. Imaging revealed a high grade SBO in the RLQ on CT scan. Hospital course is as follows:  12/06 - s/p exploratory laparotomy, lysis of adhesions, decompression of bowel via enterotomy w/ primary repair, and Abthera VAC placement as the distal 50% of the bowel appeared dusky but was still viable, admitted to SICU post-operatively as he was on vasopressor support and was left intubated  12/08 - s/p re-exploration, proximal 165 cm of small bowel appeared pink & viable but beyond that had patchy areas of ischemia so decision was made to give the bowel more time to demarcate before resecting in order to preserve as much small bowel as possible so Abthera VAC was replaced  12/09 - s/p re-exploration, small bowel resection of 150 cm (150 cm remaining), ileocecetomy, end ileostomy, mucous fistula, and abdominal closure  12/11 - extubated to BiPAP, noted to be in SVT that was rate controlled w/ metoprolol  12/14 - started on TPN  12/15 - noted to have spontaneous left pneumothorax s/p left pigtail catheter placement, noted to be in SVT again so amiodarone started, high ileostomy output noted so concern for short bowel syndrome  12/16 - amiodarone discontinued, started on beta blocker with metoprolol  12/18 - started Lomotil and ileostomy repletions with IV fluids  12/19 - started Imodium, left pigtail catheter was placed to water seal but pneumothorax noted on follow-up CXR so placed back to suction  12/20 - started tincture of opium, concern for aspiration so changed diet from regular to dysphagia 1 pureed with nectar-thickened liquids  12/22 - left pigtail catheter placed to water seal with no pneumothorax noted on follow-up CXR, CT chest with moderate left pleural effusion not being drained by pigtail catheter  12/23 - 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  12/24 - left pigtail catheter discontinued  12/28 - started Ancef for erythematous midline wound  12/29 - beta blocker discontinued for intermittent episodes of hypotension  12/30 - left pigtail catheter placement by IR with drainage of serous transudative fluid  01/02 - FEES demonstrating penetration with thin liquids so patient kept on dysphagia 1 pureed with nectar-thickened liquids  01/07 - Transferred to floors  01/10 - s/p left VATS w/ PleurX catheter placement  01/11 - evaluated by speech & swallow, advanced diet to mechanical soft with thin liquids  01/12 - PleurX catheter placed to water seal with on pneumothorax on follow-up CXR  01/13 - RRT for hypoxemia, placed on BiPAP, PleurX catheter placed back to suction, started vancomycin & Zosyn for possible HCAP  01/14 - noted to have minimal output from PleurX catheter, lung ultrasound with large left pleural effusion concerning for hemothorax, multiple episodes of bradycardia secondary to hypoxemia, remains on BiPAP  01/15 - worsening respiratory distress requiring intubation, hypotensive requiring vasopressor support, CT chest with large left hemothorax w/ trace pneumothorax & extensive subcutaneous emphysema so taken to OR emergently for left VATS, evacuation fo 2 L of clot, and placement of two left chest tubes  01/16 - extubated, weaned off vasopressor support  01/17 - two left chest tubes placed to water seal with no pneumothorax noted on follow-up CXR    24 HOUR EVENTS:  - DDAVP 2mcg x 2 given, w/ appropriate response  - 2nd chest tube discontinued by CT surgery team  - 500cc LR bolus x2 given for persistent hypotension to SBP of 70's  - discontinued salt tab  - bcx sent overnight, concern for beginning of sepsis    SUBJECTIVE/ROS:  [x] A ten-point review of systems was otherwise negative except as noted.  [ ] Due to altered mental status/intubation, subjective information were not able to be obtained from the patient. History was obtained, to the extent possible, from review of the chart and collateral sources of information.      NEURO  Exam: awake, alert, oriented  Meds: acetaminophen   Tablet .. 975 milliGRAM(s) Oral every 6 hours PRN Mild Pain (1 - 3)  ondansetron Injectable 4 milliGRAM(s) IV Push every 6 hours PRN Nausea and/or Vomiting  opium Tincture 6 milliGRAM(s) Oral <User Schedule>    [x] Adequacy of sedation and pain control has been assessed and adjusted      RESPIRATORY  RR: 23 (01-26-20 @ 00:00) (14 - 39)  SpO2: 98% (01-26-20 @ 00:24) (76% - 100%)  Wt(kg): --  Exam: unlabored, clear to auscultation bilaterally  Mechanical Ventilation:     [N/A] Extubation Readiness Assessed  Meds: albuterol/ipratropium for Nebulization. 3 milliLiter(s) Nebulizer every 6 hours  buDESOnide    Inhalation Suspension 0.5 milliGRAM(s) Inhalation every 12 hours        CARDIOVASCULAR  HR: 82 (01-26-20 @ 00:24) (77 - 103)  BP: 84/54 (01-26-20 @ 00:00) (72/44 - 157/72)  BP(mean): 64 (01-26-20 @ 00:00) (53 - 103)  ABP: --  ABP(mean): --  Wt(kg): --  CVP(cm H2O): --      Exam: regular rate and rhythm  Cardiac Rhythm: sinus  Perfusion     [x]Adequate   [ ]Inadequate  Mentation   [x]Normal       [ ]Reduced  Extremities  [x]Warm         [ ]Cool  Volume Status [ ]Hypervolemic [x]Euvolemic [ ]Hypovolemic  Meds: tamsulosin 0.4 milliGRAM(s) Oral daily        GI/NUTRITION  Exam: soft, nontender, nondistended, incision C/D/I  Diet: mechanical soft  Meds: diphenoxylate/atropine 2 Tablet(s) Oral <User Schedule>  loperamide 16 milliGRAM(s) Oral <User Schedule>  pantoprazole    Tablet 40 milliGRAM(s) Oral before breakfast  psyllium Powder 1 Packet(s) Oral three times a day      GENITOURINARY  I&O's Detail    01-24 @ 07:01  -  01-25 @ 07:00  --------------------------------------------------------  IN:    fat emulsion (Fish Oil and Plant Based) 20% Infusion: 150 mL    Oral Fluid: 1920 mL    Solution: 100 mL    Solution: 150 mL    TPN (Total Parenteral Nutrition): 1200 mL  Total IN: 3520 mL    OUT:    Chest Tube: 150 mL    Ileostomy: 1800 mL    Indwelling Catheter - Urethral: 2525 mL  Total OUT: 4475 mL    Total NET: -955 mL      01-25 @ 07:01  -  01-26 @ 00:55  --------------------------------------------------------  IN:    fat emulsion (Fish Oil and Plant Based) 20% Infusion: 75 mL    Lactated Ringers IV Bolus: 1000 mL    Oral Fluid: 750 mL    Solution: 50 mL    TPN (Total Parenteral Nutrition): 800 mL  Total IN: 2675 mL    OUT:    Ileostomy: 1350 mL    Indwelling Catheter - Urethral: 1535 mL  Total OUT: 2885 mL    Total NET: -210 mL          01-25    139  |  97  |  17  ----------------------------<  108<H>  4.6   |  32<H>  |  0.41<L>    Ca    8.0<L>      25 Jan 2020 03:51  Phos  2.7     01-25  Mg     1.9     01-25    TPro  5.6<L>  /  Alb  2.3<L>  /  TBili  0.3  /  DBili  0.1  /  AST  42<H>  /  ALT  47<H>  /  AlkPhos  83  01-25    [ ] Martinez catheter, indication: N/A  Meds: ergocalciferol 52885 Unit(s) Oral every week  fat emulsion (Fish Oil and Plant Based) 20% Infusion 12.5 mL/Hr IV Continuous <Continuous>  Parenteral Nutrition - Adult 1 Each TPN Continuous <Continuous>        HEMATOLOGIC  Meds: enoxaparin Injectable 40 milliGRAM(s) SubCutaneous daily    [x] VTE Prophylaxis                        7.5    7.42  )-----------( 307      ( 25 Jan 2020 03:51 )             24.1       Transfusion     [ ] PRBC   [ ] Platelets   [ ] FFP   [ ] Cryoprecipitate      INFECTIOUS DISEASES  WBC Count: 7.42 K/uL (01-25 @ 03:51)    RECENT CULTURES:    Meds: meropenem  IVPB 1000 milliGRAM(s) IV Intermittent every 8 hours    POCT Blood Glucose.: 120 mg/dL (25 Jan 2020 17:49)  POCT Blood Glucose.: 106 mg/dL (25 Jan 2020 12:52)  POCT Blood Glucose.: 116 mg/dL (25 Jan 2020 05:36)    Meds: insulin lispro (HumaLOG) corrective regimen sliding scale   SubCutaneous every 6 hours  octreotide  Injectable 100 MICROGram(s) SubCutaneous three times a day        ACCESS DEVICES:  [x] Peripheral IV  [ ] Central Venous Line	[ ] R	[ ] L	[ ] IJ	[ ] Fem	[ ] SC	Placed:   [ ] Arterial Line		[ ] R	[ ] L	[ ] Fem	[ ] Rad	[ ] Ax	Placed:   [ ] PICC:					[ ] Mediport  [ ] Urinary Catheter, Date Placed:   [x] Necessity of urinary, arterial, and venous catheters discussed    OTHER MEDICATIONS:  chlorhexidine 2% Cloths 1 Application(s) Topical <User Schedule>      CODE STATUS: full SICU DAILY PROGRESS NOTE    75y Male past medical history of COPD and EtOH dependence who presented on 12/6/2019 with abdominal pain, nausea, hematemesis, and poor PO intake for ~2-3 days. In the ED, he was found to be hypotensive & tachycardic. Labs revealed an CHI and lactic acidosis. Imaging revealed a high grade SBO in the RLQ on CT scan. Hospital course is as follows:  12/06 - s/p exploratory laparotomy, lysis of adhesions, decompression of bowel via enterotomy w/ primary repair, and Abthera VAC placement as the distal 50% of the bowel appeared dusky but was still viable, admitted to SICU post-operatively as he was on vasopressor support and was left intubated  12/08 - s/p re-exploration, proximal 165 cm of small bowel appeared pink & viable but beyond that had patchy areas of ischemia so decision was made to give the bowel more time to demarcate before resecting in order to preserve as much small bowel as possible so Abthera VAC was replaced  12/09 - s/p re-exploration, small bowel resection of 150 cm (150 cm remaining), ileocecetomy, end ileostomy, mucous fistula, and abdominal closure  12/11 - extubated to BiPAP, noted to be in SVT that was rate controlled w/ metoprolol  12/14 - started on TPN  12/15 - noted to have spontaneous left pneumothorax s/p left pigtail catheter placement, noted to be in SVT again so amiodarone started, high ileostomy output noted so concern for short bowel syndrome  12/16 - amiodarone discontinued, started on beta blocker with metoprolol  12/18 - started Lomotil and ileostomy repletions with IV fluids  12/19 - started Imodium, left pigtail catheter was placed to water seal but pneumothorax noted on follow-up CXR so placed back to suction  12/20 - started tincture of opium, concern for aspiration so changed diet from regular to dysphagia 1 pureed with nectar-thickened liquids  12/22 - left pigtail catheter placed to water seal with no pneumothorax noted on follow-up CXR, CT chest with moderate left pleural effusion not being drained by pigtail catheter  12/23 - 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  12/24 - left pigtail catheter discontinued  12/28 - started Ancef for erythematous midline wound  12/29 - beta blocker discontinued for intermittent episodes of hypotension  12/30 - left pigtail catheter placement by IR with drainage of serous transudative fluid  01/02 - FEES demonstrating penetration with thin liquids so patient kept on dysphagia 1 pureed with nectar-thickened liquids  01/07 - Transferred to floors  01/10 - s/p left VATS w/ PleurX catheter placement  01/11 - evaluated by speech & swallow, advanced diet to mechanical soft with thin liquids  01/12 - PleurX catheter placed to water seal with on pneumothorax on follow-up CXR  01/13 - RRT for hypoxemia, placed on BiPAP, PleurX catheter placed back to suction, started vancomycin & Zosyn for possible HCAP  01/14 - noted to have minimal output from PleurX catheter, lung ultrasound with large left pleural effusion concerning for hemothorax, multiple episodes of bradycardia secondary to hypoxemia, remains on BiPAP  01/15 - worsening respiratory distress requiring intubation, hypotensive requiring vasopressor support, CT chest with large left hemothorax w/ trace pneumothorax & extensive subcutaneous emphysema so taken to OR emergently for left VATS, evacuation fo 2 L of clot, and placement of two left chest tubes  01/16 - extubated, weaned off vasopressor support  01/17 - two left chest tubes placed to water seal with no pneumothorax noted on follow-up CXR    24 HOUR EVENTS:  - DDAVP 2mcg x 2 given, w/ appropriate response  - 2nd chest tube discontinued by CT surgery team  - 500cc LR bolus x2 given for persistent hypotension to SBP of 70's  - discontinued salt tab  - bcx sent overnight, concern for beginning of sepsis  - drop in H/h, transfused 1u PRBC    SUBJECTIVE/ROS:  [x] A ten-point review of systems was otherwise negative except as noted.  [ ] Due to altered mental status/intubation, subjective information were not able to be obtained from the patient. History was obtained, to the extent possible, from review of the chart and collateral sources of information.      NEURO  Exam: awake, alert, oriented  Meds: acetaminophen   Tablet .. 975 milliGRAM(s) Oral every 6 hours PRN Mild Pain (1 - 3)  ondansetron Injectable 4 milliGRAM(s) IV Push every 6 hours PRN Nausea and/or Vomiting  opium Tincture 6 milliGRAM(s) Oral <User Schedule>    [x] Adequacy of sedation and pain control has been assessed and adjusted      RESPIRATORY  RR: 23 (01-26-20 @ 00:00) (14 - 39)  SpO2: 98% (01-26-20 @ 00:24) (76% - 100%)  Wt(kg): --  Exam: unlabored, clear to auscultation bilaterally  Mechanical Ventilation:     [N/A] Extubation Readiness Assessed  Meds: albuterol/ipratropium for Nebulization. 3 milliLiter(s) Nebulizer every 6 hours  buDESOnide    Inhalation Suspension 0.5 milliGRAM(s) Inhalation every 12 hours        CARDIOVASCULAR  HR: 82 (01-26-20 @ 00:24) (77 - 103)  BP: 84/54 (01-26-20 @ 00:00) (72/44 - 157/72)  BP(mean): 64 (01-26-20 @ 00:00) (53 - 103)  ABP: --  ABP(mean): --  Wt(kg): --  CVP(cm H2O): --      Exam: regular rate and rhythm  Cardiac Rhythm: sinus  Perfusion     [x]Adequate   [ ]Inadequate  Mentation   [x]Normal       [ ]Reduced  Extremities  [x]Warm         [ ]Cool  Volume Status [ ]Hypervolemic [x]Euvolemic [ ]Hypovolemic  Meds: tamsulosin 0.4 milliGRAM(s) Oral daily        GI/NUTRITION  Exam: soft, nontender, nondistended, incision C/D/I  Diet: mechanical soft  Meds: diphenoxylate/atropine 2 Tablet(s) Oral <User Schedule>  loperamide 16 milliGRAM(s) Oral <User Schedule>  pantoprazole    Tablet 40 milliGRAM(s) Oral before breakfast  psyllium Powder 1 Packet(s) Oral three times a day      GENITOURINARY  I&O's Detail    01-24 @ 07:01  -  01-25 @ 07:00  --------------------------------------------------------  IN:    fat emulsion (Fish Oil and Plant Based) 20% Infusion: 150 mL    Oral Fluid: 1920 mL    Solution: 100 mL    Solution: 150 mL    TPN (Total Parenteral Nutrition): 1200 mL  Total IN: 3520 mL    OUT:    Chest Tube: 150 mL    Ileostomy: 1800 mL    Indwelling Catheter - Urethral: 2525 mL  Total OUT: 4475 mL    Total NET: -955 mL      01-25 @ 07:01  -  01-26 @ 00:55  --------------------------------------------------------  IN:    fat emulsion (Fish Oil and Plant Based) 20% Infusion: 75 mL    Lactated Ringers IV Bolus: 1000 mL    Oral Fluid: 750 mL    Solution: 50 mL    TPN (Total Parenteral Nutrition): 800 mL  Total IN: 2675 mL    OUT:    Ileostomy: 1350 mL    Indwelling Catheter - Urethral: 1535 mL  Total OUT: 2885 mL    Total NET: -210 mL          01-25    139  |  97  |  17  ----------------------------<  108<H>  4.6   |  32<H>  |  0.41<L>    Ca    8.0<L>      25 Jan 2020 03:51  Phos  2.7     01-25  Mg     1.9     01-25    TPro  5.6<L>  /  Alb  2.3<L>  /  TBili  0.3  /  DBili  0.1  /  AST  42<H>  /  ALT  47<H>  /  AlkPhos  83  01-25    [ ] Martinez catheter, indication: N/A  Meds: ergocalciferol 04567 Unit(s) Oral every week  fat emulsion (Fish Oil and Plant Based) 20% Infusion 12.5 mL/Hr IV Continuous <Continuous>  Parenteral Nutrition - Adult 1 Each TPN Continuous <Continuous>        HEMATOLOGIC  Meds: enoxaparin Injectable 40 milliGRAM(s) SubCutaneous daily    [x] VTE Prophylaxis                        7.5    7.42  )-----------( 307      ( 25 Jan 2020 03:51 )             24.1       Transfusion     [ ] PRBC   [ ] Platelets   [ ] FFP   [ ] Cryoprecipitate      INFECTIOUS DISEASES  WBC Count: 7.42 K/uL (01-25 @ 03:51)    RECENT CULTURES:    Meds: meropenem  IVPB 1000 milliGRAM(s) IV Intermittent every 8 hours    POCT Blood Glucose.: 120 mg/dL (25 Jan 2020 17:49)  POCT Blood Glucose.: 106 mg/dL (25 Jan 2020 12:52)  POCT Blood Glucose.: 116 mg/dL (25 Jan 2020 05:36)    Meds: insulin lispro (HumaLOG) corrective regimen sliding scale   SubCutaneous every 6 hours  octreotide  Injectable 100 MICROGram(s) SubCutaneous three times a day        ACCESS DEVICES:  [x] Peripheral IV  [ ] Central Venous Line	[ ] R	[ ] L	[ ] IJ	[ ] Fem	[ ] SC	Placed:   [ ] Arterial Line		[ ] R	[ ] L	[ ] Fem	[ ] Rad	[ ] Ax	Placed:   [ ] PICC:					[ ] Mediport  [ ] Urinary Catheter, Date Placed:   [x] Necessity of urinary, arterial, and venous catheters discussed    OTHER MEDICATIONS:  chlorhexidine 2% Cloths 1 Application(s) Topical <User Schedule>      CODE STATUS: full

## 2020-01-26 NOTE — PROGRESS NOTE ADULT - ASSESSMENT
75 y/o male presenting with high grade SBO s/p exploratory laparotomy, lysis of adhesions, decompression of bowel via enterotomy w/ primary repair, & Abthera VAC placement (12/06/2019); RTOR for re-exploration w/ Abthera VAC replacement (12/08/2019) to allow for demarcation of ischemic small bowel; RTOR for re-exploration, small bowel resection of 150 cm (150 cm remaining), ileocecetomy, end ileostomy, mucous fistula, and abdominal closure (12/10/2019); hospital course complicated by SVT, short bowel syndrome requiring repletions w/ IV fluids, malnutrition requiring TPN, intermittent episodes of hypotension, spontaneous left pneumothorax s/p pigtail catheter (12/15/2019-12/24/2019), left pleural effusion s/p pigtail catheter w/ IR (12/30/2019), dysphagia, and oral HSV lesions, placement of Pleurx on 1/10, now re-admitted to SICU with acute hypoxic respiratory failure s/p repeat VATS with 2 liter hemothorax removed. Respiratory failure resolved, now stable in SICU.    Neuro: no acute issues  - Pain control as needed with acetaminophen    Resp: COPD, spontaneous left pneumothorax s/p pigtail catheter, left pleural effusion s/p Pleurx 1/10.  Intubated for respiratory distress s/p VATS 01/15 with 2 Liter of blood evacuation   - CT tube discontinued  - Pulmicort and Duoneb for COPD  - Will need outpatient follow-up with a pulmonologist for his COPD as patient does not currently have one  - daily CXR    CV: Bradycardia, SVT.  - No interval dysrhythmias   - persistent intermittent hypotension, s/p 500cc LR bolus x2    GI: s/p exploratory laparotomy, Grecia, decompression of bowel via enterotomy w/ primary repair, & Abthera VAC placement (12/06/2019); re-exploration w/ ABthera VAC replacement (12/08/2019); re-exploration, small bowel resection of 150 cm (150 cm remaining), ileocecectomy end ileostomy, mucous fistula, and abdominal closure (12/10/2019); short bowel syndrome, malnutrition, dysphagia  - Dysphagia diet + TPN  - Monitor ostomy output  - Continue Lomotil, tincture of opium, loperamide, and octreotide for short bowel syndrome  - Protonix to decrease gastric secretions      Renal: no acute issues  - s/p DDAVP x 2 yesterday  - Monitor I&Os  - Monitor electrolytes and replete as necessary  - salt tabs discontinued  - rosas catheter in place    Heme: anemia likely secondary to malnutrition / malabsorption  - Monitor CBC and coags  - Q12 CBCs   - Lovenox for VTE prophylaxis    ID: oral HSV lesions (s/p acyclovir 12/23/2019-01/02/2020)  - Combicath 1/16 with pseudomonas resistant to Zosyn  - bcx 1/16 NGTD  - f/u bcx 1/25  - Meropenem    Endo: no acute issues  - Monitor glucose before meals & at bedtime while on TPN  - Insulin sliding scale.    Disposition:  - Will remain in SICU 73 y/o male presenting with high grade SBO s/p exploratory laparotomy, lysis of adhesions, decompression of bowel via enterotomy w/ primary repair, & Abthera VAC placement (12/06/2019); RTOR for re-exploration w/ Abthera VAC replacement (12/08/2019) to allow for demarcation of ischemic small bowel; RTOR for re-exploration, small bowel resection of 150 cm (150 cm remaining), ileocecetomy, end ileostomy, mucous fistula, and abdominal closure (12/10/2019); hospital course complicated by SVT, short bowel syndrome requiring repletions w/ IV fluids, malnutrition requiring TPN, intermittent episodes of hypotension, spontaneous left pneumothorax s/p pigtail catheter (12/15/2019-12/24/2019), left pleural effusion s/p pigtail catheter w/ IR (12/30/2019), dysphagia, and oral HSV lesions, placement of Pleurx on 1/10, now re-admitted to SICU with acute hypoxic respiratory failure s/p repeat VATS with 2 liter hemothorax removed. Respiratory failure resolved, now stable in SICU.    Neuro: no acute issues  - Pain control as needed with acetaminophen    Resp: COPD, spontaneous left pneumothorax s/p pigtail catheter, left pleural effusion s/p Pleurx 1/10.  Intubated for respiratory distress s/p VATS 01/15 with 2 Liter of blood evacuation   - CT tube discontinued  - Pulmicort and Duoneb for COPD  - Will need outpatient follow-up with a pulmonologist for his COPD as patient does not currently have one  - daily CXR    CV: Bradycardia, SVT.  - No interval dysrhythmias   - persistent intermittent hypotension, s/p 500cc LR bolus x2    GI: s/p exploratory laparotomy, Grecia, decompression of bowel via enterotomy w/ primary repair, & Abthera VAC placement (12/06/2019); re-exploration w/ ABthera VAC replacement (12/08/2019); re-exploration, small bowel resection of 150 cm (150 cm remaining), ileocecectomy end ileostomy, mucous fistula, and abdominal closure (12/10/2019); short bowel syndrome, malnutrition, dysphagia  - Dysphagia diet + TPN  - Monitor ostomy output  - Continue Lomotil, tincture of opium, loperamide, and octreotide for short bowel syndrome  - Protonix to decrease gastric secretions      Renal: no acute issues  - s/p DDAVP x 2 yesterday  - Monitor I&Os  - Monitor electrolytes and replete as necessary  - salt tabs discontinued  - rosas catheter in place    Heme: anemia likely secondary to malnutrition / malabsorption  - Monitor CBC and coags, s/p 1u PRBC overnight for drop in H/h  - Q12 CBCs   - Lovenox for VTE prophylaxis    ID: oral HSV lesions (s/p acyclovir 12/23/2019-01/02/2020)  - Combicath 1/16 with pseudomonas resistant to Zosyn  - bcx 1/16 NGTD  - f/u bcx 1/25      Endo: no acute issues  - Monitor glucose before meals & at bedtime while on TPN  - Insulin sliding scale.    Disposition:  - Will remain in SICU

## 2020-01-26 NOTE — PROGRESS NOTE ADULT - SUBJECTIVE AND OBJECTIVE BOX
Subjective: Patient seen and examined. No new events except as noted.   remains in ICU   hypotensive overnight   no cp or sob     REVIEW OF SYSTEMS:    CONSTITUTIONAL:+ weakness, fevers or chills  EYES/ENT: No visual changes;  No vertigo or throat pain   NECK: No pain or stiffness  RESPIRATORY: No cough, wheezing, hemoptysis; No shortness of breath  CARDIOVASCULAR: No chest pain or palpitations  GASTROINTESTINAL: No abdominal or epigastric pain. No nausea, vomiting, or hematemesis; No diarrhea or constipation. No melena or hematochezia.  GENITOURINARY: No dysuria, frequency or hematuria  NEUROLOGICAL: No numbness or weakness  SKIN: No itching, burning, rashes, or lesions   All other review of systems is negative unless indicated above.    MEDICATIONS:  MEDICATIONS  (STANDING):  albuterol/ipratropium for Nebulization. 3 milliLiter(s) Nebulizer every 6 hours  buDESOnide    Inhalation Suspension 0.5 milliGRAM(s) Inhalation every 12 hours  chlorhexidine 2% Cloths 1 Application(s) Topical <User Schedule>  diphenoxylate/atropine 2 Tablet(s) Oral <User Schedule>  enoxaparin Injectable 40 milliGRAM(s) SubCutaneous daily  ergocalciferol 74334 Unit(s) Oral every week  fat emulsion (Fish Oil and Plant Based) 20% Infusion 12.5 mL/Hr (12.5 mL/Hr) IV Continuous <Continuous>  insulin lispro (HumaLOG) corrective regimen sliding scale   SubCutaneous every 6 hours  loperamide 16 milliGRAM(s) Oral <User Schedule>  octreotide  Injectable 100 MICROGram(s) SubCutaneous three times a day  opium Tincture 6 milliGRAM(s) Oral <User Schedule>  pantoprazole    Tablet 40 milliGRAM(s) Oral before breakfast  Parenteral Nutrition - Adult 1 Each (50 mL/Hr) TPN Continuous <Continuous>  Parenteral Nutrition - Adult 1 Each (50 mL/Hr) TPN Continuous <Continuous>  psyllium Powder 1 Packet(s) Oral three times a day  tamsulosin 0.4 milliGRAM(s) Oral daily      PHYSICAL EXAM:  T(C): 37 (01-26-20 @ 07:00), Max: 37 (01-26-20 @ 07:00)  HR: 78 (01-26-20 @ 10:00) (70 - 103)  BP: 95/51 (01-26-20 @ 10:00) (72/44 - 112/59)  RR: 17 (01-26-20 @ 10:00) (13 - 39)  SpO2: 99% (01-26-20 @ 10:00) (76% - 100%)  Wt(kg): --  I&O's Summary    25 Jan 2020 07:01  -  26 Jan 2020 07:00  --------------------------------------------------------  IN: 3635 mL / OUT: 3215 mL / NET: 420 mL    26 Jan 2020 07:01  -  26 Jan 2020 11:20  --------------------------------------------------------  IN: 150 mL / OUT: 65 mL / NET: 85 mL              Appearance: NAD   HEENT:   Dry oral mucosa, crusted lips   Lymphatic: No lymphadenopathy   Cardiovascular: Normal S1 S2, No JVD, No murmurs , Peripheral pulses palpable 2+ bilaterally  Respiratory: Decreased bs   Gastrointestinal:  Soft, NT, ND. Ostomy pink with output. Midline staples in place, midline incision site is c/d/i   Skin: No rashes, No ecchymoses, No cyanosis, warm to touch  Musculoskeletal: Decreased  range of motion and strength  Psychiatry:  mildly sedated   Ext: No edema +PICC line   +rosas     LABS:    CARDIAC MARKERS:  CARDIAC MARKERS ( 26 Jan 2020 02:08 )  x     / x     / 31 U/L / x     / 4.2 ng/mL                                7.4    7.42  )-----------( 258      ( 26 Jan 2020 05:32 )             23.4     01-26    138  |  98  |  23  ----------------------------<  148<H>  4.9   |  31  |  0.44<L>    Ca    7.8<L>      26 Jan 2020 01:35  Phos  3.4     01-26  Mg     2.2     01-26    TPro  5.3<L>  /  Alb  2.1<L>  /  TBili  0.2  /  DBili  <0.1  /  AST  31  /  ALT  40  /  AlkPhos  72  01-26    proBNP:   Lipid Profile:   HgA1c:   TSH:               TELEMETRY: 	  SR  ECG:  	  RADIOLOGY: < from: Xray Chest 1 View- PORTABLE-Routine (01.25.20 @ 14:06) >    EXAM:  XR CHEST PORTABLE ROUTINE 1V                            PROCEDURE DATE:  01/25/2020            INTERPRETATION:  CLINICAL INFORMATION: Status post removal of left chest tube.    TECHNIQUE: Frontal radiograph of the chest.    COMPARISON: Chestx-ray 1/25/2020 at 0721.    FINDINGS:  Right PICC tip is in the superior vena cava. Interval removal of left chest tube.  Right base atelectasis and pleural effusion. Left lung is clear. No pneumothorax.  The heart is normal in size. Aortic knob calcifications noted.  The visualized osseous structures are unremarkable.     IMPRESSION:  Interval removal of left chest tube without pneumothorax.  Redemonstrated right pleural effusion and basilar atelectasis.                CYNTHIA LR M.D., RADIOLOGY RESIDENT  This document has been electronically signed.  JOSE FORD M.D., ATTENDING RADIOLOGIST  This document has been electronically signed. Jan 26 2020 10:19AM                < end of copied text >    DIAGNOSTIC TESTING:  [ ] Echocardiogram:  [ ]  Catheterization:  [ ] Stress Test:    OTHER:

## 2020-01-26 NOTE — PROVIDER CONTACT NOTE (OTHER) - BACKGROUND
Pt admit for GI hemorrhage, SBO s/p exlap, pneumothorax s/p VATS & 2 pleurX w/ PMH of COPD, ETOH abuse.

## 2020-01-26 NOTE — PROGRESS NOTE ADULT - ASSESSMENT
74 y/o M presenting with septic shock and high grade SBO s/p exploratory laparotomy, lysis of adhesions, decompression of bowel via enterotomy w/ primary repair, and Abthera VAC placement on 12/6; s/p take down of Abthera, washout and re-application of the Abthera vac on 12/8. 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 s/p L chest tube placement by IR 12/30 for pleural effusion now s/p VATS, with chest tube insertion for drainage of pleural effusion. RRT called 1/13 AM for hypoxia, patient transferred back to SICU.  Patient continued SOB, chest drain possible obstruction expanding. Patent taken back to OR emergently 1/15 for clot evac and VATS.     PLAN:  - F/u H/H and vital signs  - f/u blood cultures  - Diet as tolerated  - OOB to chair  - Continue Lomotil, loperamide, tincture of opium, octreotide  - improving on meropenem, day 7 of 7  - Appreciate Nephrology recs  - Appreciate continued SICU care    [facility]  [id number]

## 2020-01-26 NOTE — PROGRESS NOTE ADULT - ASSESSMENT
75M with ischemic bowel 2/2 SBO s/p ex-lap, SBR and end ileostomy/mucus fistula and PTX/pleural effusions s/p VATS with CT placement. Recovering in SICU.  H/H drop last night requiring 1U PRBC    - diet as tolerated  - TPN per nutrition  - monitor H/H, tx as needed  - continue antimotility bowel regimen for high stoma output  - Last day Abx (1/26/2020)  - excellent care per SICU  - Pt d/w Dr. Pa

## 2020-01-26 NOTE — PROGRESS NOTE ADULT - ATTENDING COMMENTS
Dr. Carroll (Attending Physician)  Mental status improved today  Acute resp insufficiency - BiPAP available at night  Hypotensive overnight responded to IV fluids, likely hypovolemic as patient had high output trops slightly increased will trend  ostomy output 1470 for past 24 hours  polyuria responded to ddavp and improved  repeat blood cx yesterday but procalcitonin negative  am cortisol level low will cosyntropin stim, tsh and t4 pending, adh level pending

## 2020-01-26 NOTE — PROGRESS NOTE ADULT - SUBJECTIVE AND OBJECTIVE BOX
[facility] NUTRITION SUPPORT--  Attending/ PA FOLLOW UP NOTE      24 hour events/subjective: Denies Palpitations, chest pain, shortness of breath. Denies nausea nor vomiting nor abdominal pain.    24 HOUR EVENTS:  - DDAVP 2mcg x 2 given, w/ appropriate response  - 2nd chest tube discontinued by CT surgery team  - 500cc LR bolus x2 given for persistent hypotension to SBP of 70's  - discontinued salt tab  - bcx sent overnight, concern for beginning of sepsis  - drop in H/h, transfused 1u PRBC    PAST HISTORY  --------------------------------------------------------------------------------  No significant changes to PMH, PSH, FHx, SHx, unless otherwise noted    ALLERGIES & MEDICATIONS  --------------------------------------------------------------------------------  Allergies    IV Contrast (Hives)    Intolerances      Standing Inpatient Medications  albuterol/ipratropium for Nebulization. 3 milliLiter(s) Nebulizer every 6 hours  buDESOnide    Inhalation Suspension 0.5 milliGRAM(s) Inhalation every 12 hours  chlorhexidine 2% Cloths 1 Application(s) Topical <User Schedule>  diphenoxylate/atropine 2 Tablet(s) Oral <User Schedule>  enoxaparin Injectable 40 milliGRAM(s) SubCutaneous daily  ergocalciferol 20094 Unit(s) Oral every week  fat emulsion (Fish Oil and Plant Based) 20% Infusion 12.5 mL/Hr IV Continuous <Continuous>  insulin lispro (HumaLOG) corrective regimen sliding scale   SubCutaneous every 6 hours  loperamide 16 milliGRAM(s) Oral <User Schedule>  octreotide  Injectable 100 MICROGram(s) SubCutaneous three times a day  opium Tincture 6 milliGRAM(s) Oral <User Schedule>  pantoprazole    Tablet 40 milliGRAM(s) Oral before breakfast  Parenteral Nutrition - Adult 1 Each TPN Continuous <Continuous>  psyllium Powder 1 Packet(s) Oral three times a day  tamsulosin 0.4 milliGRAM(s) Oral daily    PRN Inpatient Medications  acetaminophen   Tablet .. 975 milliGRAM(s) Oral every 6 hours PRN  ondansetron Injectable 4 milliGRAM(s) IV Push every 6 hours PRN      REVIEW OF SYSTEMS  --------------------------------------------------------------------------------  Gen: as per HPI  Skin: No rashes  Head/Eyes/Ears/Mouth: No headache;No sore throat  Respiratory: No dyspnea, cough,   CV: No chest pain, PND, orthopnea  GI: as per HPI  : No increased frequency, dysuria, hematuria, nocturia  MSK: No joint pain/swelling; no back pain; no edema  Neuro: No dizziness/lightheadedness, weakness, seizures, numbness, tingling  Psych: No significant nervousness, anxiety, stress, depression    All other systems were reviewed and are negative, except as noted.      LABS/STUDIES  --------------------------------------------------------------------------------              7.4    7.42  >-----------<  258      [01-26-20 @ 05:32]              23.4     138  |  98  |  23  ----------------------------<  148      [01-26-20 @ 01:35]  4.9   |  31  |  0.44        Ca     7.8     [01-26-20 @ 01:35]      iCa    1.13     [01-25 @ 05:02]      Mg     2.2     [01-26-20 @ 01:35]      Phos  3.4     [01-26-20 @ 01:35]    TPro  5.3  /  Alb  2.1  /  TBili  0.2  /  DBili  <0.1  /  AST  31  /  ALT  40  /  AlkPhos  72  [01-26-20 @ 01:35]        CK 31      [01-26-20 @ 02:08]    Blood Gas Calcium, Ionized - Venous: 1.14 mmoL/L (01-26-20 @ 01:33)    Glucose, Serum: 148 mg/dL (01-26-20 @ 01:35)  Prealbumin, Serum: 16 mg/dL (01-24-20 @ 02:53)  Prealbumin, Serum: 13 mg/dL (01-21-20 @ 02:00)  Prealbumin, Serum: 15 mg/dL (01-15-20 @ 09:01)  Prealbumin, Serum: 13 mg/dL (01-14-20 @ 07:29)  Prealbumin, Serum: 13 mg/dL (01-07-20 @ 02:35)    Triglycerides      01-25-20 @ 07:01  -  01-26-20 @ 07:00  --------------------------------------------------------  IN: 3635 mL / OUT: 3215 mL / NET: 420 mL        VITALS/PHYSICAL EXAM  --------------------------------------------------------------------------------  T(C): 36.9 (01-26-20 @ 03:00), Max: 36.9 (01-26-20 @ 03:00)  HR: 81 (01-26-20 @ 07:00) (70 - 103)  BP: 90/52 (01-26-20 @ 07:00) (72/44 - 112/59)  RR: 32 (01-26-20 @ 07:00) (13 - 39)  SpO2: 98% (01-26-20 @ 07:00) (76% - 100%)  Wt(kg): --    Physical Exam:  --------------------------------------------------------------------------------  	Gen: WD NAD, A&Ox3, NC O2  	HEENT: NC/AT, PERR              neck, trachea midline,              Chest:  decreased BS Lt base; Lt chest w/ CT x1 to water seal  	Abd: softly distended, non tender, midline incision c/d/i w/o drainage                   (+)ostomy pink viable- mustard like stool              MSK: FROM x4, Equally  BUE w/o edema, clubbing & cyanosis, Warm, (+)BLE mild edema, no clubbing, cyanosis,                  RUE PICC w/o sx infection   	Neuro: No focal deficits, intact sensation  	Psych: Normal affect and mood  .  .  .  . [facility] NUTRITION SUPPORT--  Attending/ PA FOLLOW UP NOTE      24 hour events/subjective: Pt tolerating TPN at 1/2 dose, and denies palpitations, chest pain, shortness of breath. Denies nausea nor vomiting nor abdominal pain. Reports eating eggs and cereal this AM, and chicken for dinner and a little of the burger for lunch.    24 HOUR EVENTS:  - DDAVP 2mcg x 2 given, w/ appropriate response  - 2nd chest tube discontinued by CT surgery team  - 500cc LR bolus x2 given for persistent hypotension to SBP of 70's  - discontinued salt tab  - bcx sent overnight, concern for beginning of sepsis  - drop in H/h, transfused 1u PRBC    PAST HISTORY  --------------------------------------------------------------------------------  No significant changes to PMH, PSH, FHx, SHx, unless otherwise noted    ALLERGIES & MEDICATIONS  --------------------------------------------------------------------------------  Allergies    IV Contrast (Hives)    Intolerances      Standing Inpatient Medications  albuterol/ipratropium for Nebulization. 3 milliLiter(s) Nebulizer every 6 hours  buDESOnide    Inhalation Suspension 0.5 milliGRAM(s) Inhalation every 12 hours  chlorhexidine 2% Cloths 1 Application(s) Topical <User Schedule>  diphenoxylate/atropine 2 Tablet(s) Oral <User Schedule>  enoxaparin Injectable 40 milliGRAM(s) SubCutaneous daily  ergocalciferol 17916 Unit(s) Oral every week  fat emulsion (Fish Oil and Plant Based) 20% Infusion 12.5 mL/Hr IV Continuous <Continuous>  insulin lispro (HumaLOG) corrective regimen sliding scale   SubCutaneous every 6 hours  loperamide 16 milliGRAM(s) Oral <User Schedule>  octreotide  Injectable 100 MICROGram(s) SubCutaneous three times a day  opium Tincture 6 milliGRAM(s) Oral <User Schedule>  pantoprazole    Tablet 40 milliGRAM(s) Oral before breakfast  Parenteral Nutrition - Adult 1 Each TPN Continuous <Continuous>  psyllium Powder 1 Packet(s) Oral three times a day  tamsulosin 0.4 milliGRAM(s) Oral daily    PRN Inpatient Medications  acetaminophen   Tablet .. 975 milliGRAM(s) Oral every 6 hours PRN  ondansetron Injectable 4 milliGRAM(s) IV Push every 6 hours PRN      REVIEW OF SYSTEMS  --------------------------------------------------------------------------------  Gen: as per HPI  Skin: No rashes  Head/Eyes/Ears/Mouth: No headache;No sore throat  Respiratory: No dyspnea, cough,   CV: No chest pain, PND, orthopnea  GI: as per HPI  : No increased frequency, dysuria, hematuria, nocturia  MSK: No joint pain/swelling; no back pain; no edema  Neuro: No dizziness/lightheadedness, weakness, seizures, numbness, tingling  Psych: No significant nervousness, anxiety, stress, depression    All other systems were reviewed and are negative, except as noted.      LABS/STUDIES  --------------------------------------------------------------------------------              7.4    7.42  >-----------<  258      [01-26-20 @ 05:32]              23.4     138  |  98  |  23  ----------------------------<  148      [01-26-20 @ 01:35]  4.9   |  31  |  0.44        Ca     7.8     [01-26-20 @ 01:35]      iCa    1.13     [01-25 @ 05:02]      Mg     2.2     [01-26-20 @ 01:35]      Phos  3.4     [01-26-20 @ 01:35]    TPro  5.3  /  Alb  2.1  /  TBili  0.2  /  DBili  <0.1  /  AST  31  /  ALT  40  /  AlkPhos  72  [01-26-20 @ 01:35]        CK 31      [01-26-20 @ 02:08]    Blood Gas Calcium, Ionized - Venous: 1.14 mmoL/L (01-26-20 @ 01:33)    Glucose, Serum: 148 mg/dL (01-26-20 @ 01:35)  Prealbumin, Serum: 16 mg/dL (01-24-20 @ 02:53)  Prealbumin, Serum: 13 mg/dL (01-21-20 @ 02:00)  Prealbumin, Serum: 15 mg/dL (01-15-20 @ 09:01)  Prealbumin, Serum: 13 mg/dL (01-14-20 @ 07:29)  Prealbumin, Serum: 13 mg/dL (01-07-20 @ 02:35)    Triglycerides      01-25-20 @ 07:01  -  01-26-20 @ 07:00  --------------------------------------------------------  IN: 3635 mL / OUT: 3215 mL / NET: 420 mL        VITALS/PHYSICAL EXAM  --------------------------------------------------------------------------------  T(C): 36.9 (01-26-20 @ 03:00), Max: 36.9 (01-26-20 @ 03:00)  HR: 81 (01-26-20 @ 07:00) (70 - 103)  BP: 90/52 (01-26-20 @ 07:00) (72/44 - 112/59)  RR: 32 (01-26-20 @ 07:00) (13 - 39)  SpO2: 98% (01-26-20 @ 07:00) (76% - 100%)  Wt(kg): --    Physical Exam:  --------------------------------------------------------------------------------  	Gen: WD NAD, A&Ox3, NC O2  	HEENT: NC/AT, PERR              neck, trachea midline,              Chest:  decreased BS Lt base; Lt chest w/ CT x1 to water seal  	Abd: softly distended, non tender, midline incision c/d/i w/o drainage                   (+)ostomy pink viable- mustard like stool              MSK: FROM x4, Equally  BUE w/o edema, clubbing & cyanosis, Warm, (+)BLE mild edema, no clubbing, cyanosis,                  RUE PICC w/o sx infection   	Neuro: No focal deficits, intact sensation  	Psych: Normal affect and mood  .  .  .  . [facility] NUTRITION SUPPORT--  Attending/ PA FOLLOW UP NOTE      24 hour events/subjective: Pt tolerating TPN at 1/2 dose, and denies palpitations, chest pain, shortness of breath. Denies nausea nor vomiting nor abdominal pain. Reports eating eggs and cereal this AM, had minced chicken for dinner and a little of the burger for lunch.    24 HOUR EVENTS:  - DDAVP 2mcg x 2 given, w/ appropriate response  - 2nd chest tube discontinued by CT surgery team  - 500cc LR bolus x2 given for persistent hypotension to SBP of 70's  - discontinued salt tab  - bcx sent overnight, concern for beginning of sepsis  - drop in H/h, transfused 1u PRBC    PAST HISTORY  --------------------------------------------------------------------------------  No significant changes to PMH, PSH, FHx, SHx, unless otherwise noted    ALLERGIES & MEDICATIONS  --------------------------------------------------------------------------------  Allergies    IV Contrast (Hives)    Intolerances      Standing Inpatient Medications  albuterol/ipratropium for Nebulization. 3 milliLiter(s) Nebulizer every 6 hours  buDESOnide    Inhalation Suspension 0.5 milliGRAM(s) Inhalation every 12 hours  chlorhexidine 2% Cloths 1 Application(s) Topical <User Schedule>  diphenoxylate/atropine 2 Tablet(s) Oral <User Schedule>  enoxaparin Injectable 40 milliGRAM(s) SubCutaneous daily  ergocalciferol 50972 Unit(s) Oral every week  fat emulsion (Fish Oil and Plant Based) 20% Infusion 12.5 mL/Hr IV Continuous <Continuous>  insulin lispro (HumaLOG) corrective regimen sliding scale   SubCutaneous every 6 hours  loperamide 16 milliGRAM(s) Oral <User Schedule>  octreotide  Injectable 100 MICROGram(s) SubCutaneous three times a day  opium Tincture 6 milliGRAM(s) Oral <User Schedule>  pantoprazole    Tablet 40 milliGRAM(s) Oral before breakfast  Parenteral Nutrition - Adult 1 Each TPN Continuous <Continuous>  psyllium Powder 1 Packet(s) Oral three times a day  tamsulosin 0.4 milliGRAM(s) Oral daily    PRN Inpatient Medications  acetaminophen   Tablet .. 975 milliGRAM(s) Oral every 6 hours PRN  ondansetron Injectable 4 milliGRAM(s) IV Push every 6 hours PRN      REVIEW OF SYSTEMS  --------------------------------------------------------------------------------  Gen: as per HPI  Skin: No rashes  Head/Eyes/Ears/Mouth: No headache;No sore throat  Respiratory: No dyspnea, cough,   CV: No chest pain, PND, orthopnea  GI: as per HPI  : No increased frequency, dysuria, hematuria, nocturia  MSK: No joint pain/swelling; no back pain; no edema  Neuro: No dizziness/lightheadedness, weakness, seizures, numbness, tingling  Psych: No significant nervousness, anxiety, stress, depression    All other systems were reviewed and are negative, except as noted.      LABS/STUDIES  --------------------------------------------------------------------------------              7.4    7.42  >-----------<  258      [01-26-20 @ 05:32]              23.4     138  |  98  |  23  ----------------------------<  148      [01-26-20 @ 01:35]  4.9   |  31  |  0.44        Ca     7.8     [01-26-20 @ 01:35]      iCa    1.13     [01-25 @ 05:02]      Mg     2.2     [01-26-20 @ 01:35]      Phos  3.4     [01-26-20 @ 01:35]    TPro  5.3  /  Alb  2.1  /  TBili  0.2  /  DBili  <0.1  /  AST  31  /  ALT  40  /  AlkPhos  72  [01-26-20 @ 01:35]        CK 31      [01-26-20 @ 02:08]    Blood Gas Calcium, Ionized - Venous: 1.14 mmoL/L (01-26-20 @ 01:33)    Glucose, Serum: 148 mg/dL (01-26-20 @ 01:35)  Prealbumin, Serum: 16 mg/dL (01-24-20 @ 02:53)  Prealbumin, Serum: 13 mg/dL (01-21-20 @ 02:00)  Prealbumin, Serum: 15 mg/dL (01-15-20 @ 09:01)  Prealbumin, Serum: 13 mg/dL (01-14-20 @ 07:29)  Prealbumin, Serum: 13 mg/dL (01-07-20 @ 02:35)    Triglycerides      01-25-20 @ 07:01  -  01-26-20 @ 07:00  --------------------------------------------------------  IN: 3635 mL / OUT: 3215 mL / NET: 420 mL        VITALS/PHYSICAL EXAM  --------------------------------------------------------------------------------  T(C): 36.9 (01-26-20 @ 03:00), Max: 36.9 (01-26-20 @ 03:00)  HR: 81 (01-26-20 @ 07:00) (70 - 103)  BP: 90/52 (01-26-20 @ 07:00) (72/44 - 112/59)  RR: 32 (01-26-20 @ 07:00) (13 - 39)  SpO2: 98% (01-26-20 @ 07:00) (76% - 100%)  Wt(kg): --    Physical Exam:  --------------------------------------------------------------------------------  	Gen: WD NAD, A&Ox3, appears fatigued  	HEENT: NC/AT, PERRL              Neck: trachea midline,              Chest:  decreased BS Lt base; Lt chest w/ CT x1 to water seal  	Abd: softly distended, non tender, midline incision c/d/i w/o drainage                   (+)ostomy pink viable- mustard like stool              MSK: FROM x4, Equally  BUE w/o edema, clubbing & cyanosis, Warm, (+)BLE mild edema +!, no clubbing, cyanosis,                  RUE PICC w/o sx infection   	Neuro: No focal deficits, intact sensation  	Psych: Normal affect and mood  .  .  .  .

## 2020-01-27 LAB
ALBUMIN SERPL ELPH-MCNC: 2.3 G/DL — LOW (ref 3.3–5)
ALP SERPL-CCNC: 74 U/L — SIGNIFICANT CHANGE UP (ref 40–120)
ALT FLD-CCNC: 29 U/L — SIGNIFICANT CHANGE UP (ref 10–45)
ANION GAP SERPL CALC-SCNC: 8 MMOL/L — SIGNIFICANT CHANGE UP (ref 5–17)
AST SERPL-CCNC: 21 U/L — SIGNIFICANT CHANGE UP (ref 10–40)
BILIRUB DIRECT SERPL-MCNC: 0.2 MG/DL — SIGNIFICANT CHANGE UP (ref 0–0.2)
BILIRUB INDIRECT FLD-MCNC: 0.1 MG/DL — LOW (ref 0.2–1)
BILIRUB SERPL-MCNC: 0.3 MG/DL — SIGNIFICANT CHANGE UP (ref 0.2–1.2)
BUN SERPL-MCNC: 21 MG/DL — SIGNIFICANT CHANGE UP (ref 7–23)
CA-I BLD-SCNC: 1.1 MMOL/L — LOW (ref 1.12–1.3)
CALCIUM SERPL-MCNC: 7.9 MG/DL — LOW (ref 8.4–10.5)
CHLORIDE SERPL-SCNC: 93 MMOL/L — LOW (ref 96–108)
CO2 SERPL-SCNC: 32 MMOL/L — HIGH (ref 22–31)
CREAT SERPL-MCNC: 0.37 MG/DL — LOW (ref 0.5–1.3)
GAS PNL BLDV: SIGNIFICANT CHANGE UP
GLUCOSE BLDC GLUCOMTR-MCNC: 123 MG/DL — HIGH (ref 70–99)
GLUCOSE SERPL-MCNC: 155 MG/DL — HIGH (ref 70–99)
HCT VFR BLD CALC: 25.5 % — LOW (ref 39–50)
HGB BLD-MCNC: 8 G/DL — LOW (ref 13–17)
MAGNESIUM SERPL-MCNC: 1.9 MG/DL — SIGNIFICANT CHANGE UP (ref 1.6–2.6)
MCHC RBC-ENTMCNC: 30.8 PG — SIGNIFICANT CHANGE UP (ref 27–34)
MCHC RBC-ENTMCNC: 31.4 GM/DL — LOW (ref 32–36)
MCV RBC AUTO: 98.1 FL — SIGNIFICANT CHANGE UP (ref 80–100)
NRBC # BLD: 0 /100 WBCS — SIGNIFICANT CHANGE UP (ref 0–0)
PHOSPHATE SERPL-MCNC: 3 MG/DL — SIGNIFICANT CHANGE UP (ref 2.5–4.5)
PLATELET # BLD AUTO: 275 K/UL — SIGNIFICANT CHANGE UP (ref 150–400)
POTASSIUM SERPL-MCNC: 4.9 MMOL/L — SIGNIFICANT CHANGE UP (ref 3.5–5.3)
POTASSIUM SERPL-SCNC: 4.9 MMOL/L — SIGNIFICANT CHANGE UP (ref 3.5–5.3)
PREALB SERPL-MCNC: 19 MG/DL — LOW (ref 20–40)
PROT SERPL-MCNC: 5.7 G/DL — LOW (ref 6–8.3)
RBC # BLD: 2.6 M/UL — LOW (ref 4.2–5.8)
RBC # FLD: 15.3 % — HIGH (ref 10.3–14.5)
SODIUM SERPL-SCNC: 133 MMOL/L — LOW (ref 135–145)
TRIGL SERPL-MCNC: 83 MG/DL — SIGNIFICANT CHANGE UP (ref 10–149)
TROPONIN T, HIGH SENSITIVITY RESULT: 46 NG/L — SIGNIFICANT CHANGE UP (ref 0–51)
TROPONIN T, HIGH SENSITIVITY RESULT: 48 NG/L — SIGNIFICANT CHANGE UP (ref 0–51)
WBC # BLD: 8.92 K/UL — SIGNIFICANT CHANGE UP (ref 3.8–10.5)
WBC # FLD AUTO: 8.92 K/UL — SIGNIFICANT CHANGE UP (ref 3.8–10.5)

## 2020-01-27 PROCEDURE — 99233 SBSQ HOSP IP/OBS HIGH 50: CPT

## 2020-01-27 PROCEDURE — 99232 SBSQ HOSP IP/OBS MODERATE 35: CPT

## 2020-01-27 PROCEDURE — 71045 X-RAY EXAM CHEST 1 VIEW: CPT | Mod: 26

## 2020-01-27 RX ORDER — COSYNTROPIN 0.25 MG/ML
0.25 INJECTION, SOLUTION INTRAVENOUS ONCE
Refills: 0 | Status: COMPLETED | OUTPATIENT
Start: 2020-01-27 | End: 2020-01-27

## 2020-01-27 RX ORDER — ELECTROLYTE SOLUTION,INJ
1 VIAL (ML) INTRAVENOUS
Refills: 0 | Status: DISCONTINUED | OUTPATIENT
Start: 2020-01-27 | End: 2020-01-27

## 2020-01-27 RX ORDER — CALCIUM GLUCONATE 100 MG/ML
2 VIAL (ML) INTRAVENOUS ONCE
Refills: 0 | Status: COMPLETED | OUTPATIENT
Start: 2020-01-27 | End: 2020-01-27

## 2020-01-27 RX ORDER — I.V. FAT EMULSION 20 G/100ML
12.5 EMULSION INTRAVENOUS
Qty: 30 | Refills: 0 | Status: DISCONTINUED | OUTPATIENT
Start: 2020-01-27 | End: 2020-01-28

## 2020-01-27 RX ORDER — MAGNESIUM SULFATE 500 MG/ML
2 VIAL (ML) INJECTION ONCE
Refills: 0 | Status: COMPLETED | OUTPATIENT
Start: 2020-01-27 | End: 2020-01-27

## 2020-01-27 RX ORDER — DIPHENHYDRAMINE HCL/ZINC ACET 2 %-0.1 %
1 CREAM (GRAM) TOPICAL
Refills: 0 | Status: DISCONTINUED | OUTPATIENT
Start: 2020-01-27 | End: 2020-02-04

## 2020-01-27 RX ORDER — ELECTROLYTE SOLUTION,INJ
1 VIAL (ML) INTRAVENOUS
Refills: 0 | Status: DISCONTINUED | OUTPATIENT
Start: 2020-01-27 | End: 2020-01-28

## 2020-01-27 RX ORDER — I.V. FAT EMULSION 20 G/100ML
12.5 EMULSION INTRAVENOUS
Qty: 30 | Refills: 0 | Status: DISCONTINUED | OUTPATIENT
Start: 2020-01-27 | End: 2020-01-27

## 2020-01-27 RX ADMIN — Medication 1 PACKET(S): at 14:03

## 2020-01-27 RX ADMIN — MORPHINE 6 MILLIGRAM(S): 10 SOLUTION ORAL at 18:08

## 2020-01-27 RX ADMIN — CHLORHEXIDINE GLUCONATE 1 APPLICATION(S): 213 SOLUTION TOPICAL at 06:15

## 2020-01-27 RX ADMIN — MORPHINE 6 MILLIGRAM(S): 10 SOLUTION ORAL at 14:04

## 2020-01-27 RX ADMIN — Medication 2 TABLET(S): at 09:34

## 2020-01-27 RX ADMIN — OCTREOTIDE ACETATE 100 MICROGRAM(S): 200 INJECTION, SOLUTION INTRAVENOUS; SUBCUTANEOUS at 22:05

## 2020-01-27 RX ADMIN — Medication 200 GRAM(S): at 01:45

## 2020-01-27 RX ADMIN — Medication 3 MILLILITER(S): at 00:21

## 2020-01-27 RX ADMIN — Medication 16 MILLIGRAM(S): at 18:08

## 2020-01-27 RX ADMIN — Medication 0.5 MILLIGRAM(S): at 05:35

## 2020-01-27 RX ADMIN — OCTREOTIDE ACETATE 100 MICROGRAM(S): 200 INJECTION, SOLUTION INTRAVENOUS; SUBCUTANEOUS at 14:03

## 2020-01-27 RX ADMIN — TAMSULOSIN HYDROCHLORIDE 0.4 MILLIGRAM(S): 0.4 CAPSULE ORAL at 14:03

## 2020-01-27 RX ADMIN — Medication 1 PACKET(S): at 06:16

## 2020-01-27 RX ADMIN — Medication 16 MILLIGRAM(S): at 22:06

## 2020-01-27 RX ADMIN — I.V. FAT EMULSION 12.5 ML/HR: 20 EMULSION INTRAVENOUS at 16:52

## 2020-01-27 RX ADMIN — Medication 50 GRAM(S): at 01:46

## 2020-01-27 RX ADMIN — Medication 2 TABLET(S): at 14:04

## 2020-01-27 RX ADMIN — COSYNTROPIN 0.25 MILLIGRAM(S): 0.25 INJECTION, SOLUTION INTRAVENOUS at 18:10

## 2020-01-27 RX ADMIN — OCTREOTIDE ACETATE 100 MICROGRAM(S): 200 INJECTION, SOLUTION INTRAVENOUS; SUBCUTANEOUS at 06:16

## 2020-01-27 RX ADMIN — Medication 0.5 MILLIGRAM(S): at 17:13

## 2020-01-27 RX ADMIN — Medication 16 MILLIGRAM(S): at 09:34

## 2020-01-27 RX ADMIN — Medication 3 MILLILITER(S): at 05:34

## 2020-01-27 RX ADMIN — Medication 3 MILLILITER(S): at 17:14

## 2020-01-27 RX ADMIN — Medication 3 MILLILITER(S): at 23:25

## 2020-01-27 RX ADMIN — Medication 2 TABLET(S): at 18:08

## 2020-01-27 RX ADMIN — Medication 2 TABLET(S): at 22:05

## 2020-01-27 RX ADMIN — Medication 3 MILLILITER(S): at 11:07

## 2020-01-27 RX ADMIN — Medication 1 EACH: at 16:52

## 2020-01-27 RX ADMIN — ENOXAPARIN SODIUM 40 MILLIGRAM(S): 100 INJECTION SUBCUTANEOUS at 14:03

## 2020-01-27 RX ADMIN — Medication 1 PACKET(S): at 22:06

## 2020-01-27 RX ADMIN — MORPHINE 6 MILLIGRAM(S): 10 SOLUTION ORAL at 22:05

## 2020-01-27 RX ADMIN — MORPHINE 6 MILLIGRAM(S): 10 SOLUTION ORAL at 09:34

## 2020-01-27 RX ADMIN — PANTOPRAZOLE SODIUM 40 MILLIGRAM(S): 20 TABLET, DELAYED RELEASE ORAL at 09:34

## 2020-01-27 RX ADMIN — Medication 16 MILLIGRAM(S): at 14:04

## 2020-01-27 NOTE — PROGRESS NOTE ADULT - SUBJECTIVE AND OBJECTIVE BOX
HISTORY  75y Male     24 HOUR EVENTS:    SUBJECTIVE/ROS:  [ ] A ten-point review of systems was otherwise negative except as noted.  [ ] Due to altered mental status/intubation, subjective information were not able to be obtained from the patient. History was obtained, to the extent possible, from review of the chart and collateral sources of information.      NEURO  RASS:     GCS:     [facility] ICU:  Exam: awake, alert, oriented  Meds: acetaminophen   Tablet .. 975 milliGRAM(s) Oral every 6 hours PRN Mild Pain (1 - 3)  ondansetron Injectable 4 milliGRAM(s) IV Push every 6 hours PRN Nausea and/or Vomiting  opium Tincture 6 milliGRAM(s) Oral <User Schedule>    [x] Adequacy of sedation and pain control has been assessed and adjusted      RESPIRATORY  RR: 20 (01-27-20 @ 04:00) (13 - 37)  SpO2: 98% (01-27-20 @ 04:00) (95% - 100%)  Wt(kg): --  Exam: unlabored, clear to auscultation bilaterally  Mechanical Ventilation:     [N/A] Extubation Readiness Assessed  Meds: albuterol/ipratropium for Nebulization. 3 milliLiter(s) Nebulizer every 6 hours  buDESOnide    Inhalation Suspension 0.5 milliGRAM(s) Inhalation every 12 hours        CARDIOVASCULAR  HR: 70 (01-27-20 @ 04:00) (68 - 96)  BP: 103/57 (01-27-20 @ 04:00) (79/47 - 134/60)  BP(mean): 78 (01-27-20 @ 04:00) (58 - 89)  ABP: --  ABP(mean): --  Wt(kg): --  CVP(cm H2O): --  VBG - ( 26 Jan 2020 01:33 )  pH: 7.36  /  pCO2: 71    /  pO2: 42    / HCO3: 39    / Base Excess: 12.5  /  SaO2: 75     Lactate: 0.7                Exam: regular rate and rhythm  Cardiac Rhythm: sinus  Perfusion     [x]Adequate   [ ]Inadequate  Mentation   [x]Normal       [ ]Reduced  Extremities  [x]Warm         [ ]Cool  Volume Status [ ]Hypervolemic [x]Euvolemic [ ]Hypovolemic  Meds: tamsulosin 0.4 milliGRAM(s) Oral daily        GI/NUTRITION  Exam: soft, nontender, nondistended, incision C/D/I  Diet:  Meds: diphenoxylate/atropine 2 Tablet(s) Oral <User Schedule>  loperamide 16 milliGRAM(s) Oral <User Schedule>  pantoprazole    Tablet 40 milliGRAM(s) Oral before breakfast  psyllium Powder 1 Packet(s) Oral three times a day      GENITOURINARY  I&O's Detail    01-25 @ 07:01 - 01-26 @ 07:00  --------------------------------------------------------  IN:    fat emulsion (Fish Oil and Plant Based) 20% Infusion: 175 mL    Lactated Ringers IV Bolus: 1000 mL    Oral Fluid: 810 mL    Packed Red Blood Cells: 350 mL    Solution: 100 mL    TPN (Total Parenteral Nutrition): 1200 mL  Total IN: 3635 mL    OUT:    Ileostomy: 1470 mL    Indwelling Catheter - Urethral: 1745 mL  Total OUT: 3215 mL    Total NET: 420 mL      01-26 @ 07:01 - 01-27 @ 04:24  --------------------------------------------------------  IN:    fat emulsion (Fish Oil and Plant Based) 20% Infusion: 112.5 mL    Oral Fluid: 850 mL    Solution: 325 mL    TPN (Total Parenteral Nutrition): 1000 mL  Total IN: 2287.5 mL    OUT:    Ileostomy: 500 mL    Indwelling Catheter - Urethral: 65 mL    Voided: 300 mL  Total OUT: 865 mL    Total NET: 1422.5 mL          01-27    133<L>  |  93<L>  |  21  ----------------------------<  155<H>  4.9   |  32<H>  |  0.37<L>    Ca    7.9<L>      27 Jan 2020 00:14  Phos  3.0     01-27  Mg     1.9     01-27    TPro  5.7<L>  /  Alb  2.3<L>  /  TBili  0.3  /  DBili  0.2  /  AST  21  /  ALT  29  /  AlkPhos  74  01-27    [ ] Martinez catheter, indication: N/A  Meds: ergocalciferol 55415 Unit(s) Oral every week  fat emulsion (Fish Oil and Plant Based) 20% Infusion 12.5 mL/Hr IV Continuous <Continuous>  Parenteral Nutrition - Adult 1 Each TPN Continuous <Continuous>        HEMATOLOGIC  Meds: enoxaparin Injectable 40 milliGRAM(s) SubCutaneous daily    [x] VTE Prophylaxis                        8.0    8.92  )-----------( 275      ( 27 Jan 2020 00:14 )             25.5       Transfusion     [ ] PRBC   [ ] Platelets   [ ] FFP   [ ] Cryoprecipitate      INFECTIOUS DISEASES  WBC Count: 8.92 K/uL (01-27 @ 00:14)  WBC Count: 7.42 K/uL (01-26 @ 05:32)    RECENT CULTURES:  Specimen Source: .Blood Blood  Date/Time: 01-26 @ 03:52  Culture Results:   No growth to date.  Gram Stain: --  Organism: --    Meds:       ENDOCRINE  CAPILLARY BLOOD GLUCOSE      POCT Blood Glucose.: 134 mg/dL (26 Jan 2020 23:01)  POCT Blood Glucose.: 134 mg/dL (26 Jan 2020 18:03)  POCT Blood Glucose.: 107 mg/dL (26 Jan 2020 12:16)  POCT Blood Glucose.: 120 mg/dL (26 Jan 2020 06:04)    Meds: insulin lispro (HumaLOG) corrective regimen sliding scale   SubCutaneous every 6 hours  octreotide  Injectable 100 MICROGram(s) SubCutaneous three times a day        ACCESS DEVICES:  [ ] Peripheral IV  [ ] Central Venous Line	[ ] R	[ ] L	[ ] IJ	[ ] Fem	[ ] SC	Placed:   [ ] Arterial Line		[ ] R	[ ] L	[ ] Fem	[ ] Rad	[ ] Ax	Placed:   [ ] PICC:					[ ] Mediport  [ ] Urinary Catheter, Date Placed:   [x] Necessity of urinary, arterial, and venous catheters discussed    OTHER MEDICATIONS:  chlorhexidine 2% Cloths 1 Application(s) Topical <User Schedule>      CODE STATUS:      IMAGING: HISTORY  75y Male past medical history of COPD and EtOH dependence who presented on 12/6/2019 with abdominal pain, nausea, hematemesis, and poor PO intake for ~2-3 days. In the ED, he was found to be hypotensive & tachycardic. Labs revealed an CHI and lactic acidosis. Imaging revealed a high grade SBO in the RLQ on CT scan. Hospital course is as follows:  12/06 - s/p exploratory laparotomy, lysis of adhesions, decompression of bowel via enterotomy w/ primary repair, and Abthera VAC placement as the distal 50% of the bowel appeared dusky but was still viable, admitted to SICU post-operatively as he was on vasopressor support and was left intubated  12/08 - s/p re-exploration, proximal 165 cm of small bowel appeared pink & viable but beyond that had patchy areas of ischemia so decision was made to give the bowel more time to demarcate before resecting in order to preserve as much small bowel as possible so Abthera VAC was replaced  12/09 - s/p re-exploration, small bowel resection of 150 cm (150 cm remaining), ileocecetomy, end ileostomy, mucous fistula, and abdominal closure  12/11 - extubated to BiPAP, noted to be in SVT that was rate controlled w/ metoprolol  12/14 - started on TPN  12/15 - noted to have spontaneous left pneumothorax s/p left pigtail catheter placement, noted to be in SVT again so amiodarone started, high ileostomy output noted so concern for short bowel syndrome  12/16 - amiodarone discontinued, started on beta blocker with metoprolol  12/18 - started Lomotil and ileostomy repletions with IV fluids  12/19 - started Imodium, left pigtail catheter was placed to water seal but pneumothorax noted on follow-up CXR so placed back to suction  12/20 - started tincture of opium, concern for aspiration so changed diet from regular to dysphagia 1 pureed with nectar-thickened liquids  12/22 - left pigtail catheter placed to water seal with no pneumothorax noted on follow-up CXR, CT chest with moderate left pleural effusion not being drained by pigtail catheter  12/23 - 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  12/24 - left pigtail catheter discontinued  12/28 - started Ancef for erythematous midline wound  12/29 - beta blocker discontinued for intermittent episodes of hypotension  12/30 - left pigtail catheter placement by IR with drainage of serous transudative fluid  01/02 - FEES demonstrating penetration with thin liquids so patient kept on dysphagia 1 pureed with nectar-thickened liquids  01/07 - Transferred to floors  01/10 - s/p left VATS w/ PleurX catheter placement  01/11 - evaluated by speech & swallow, advanced diet to mechanical soft with thin liquids  01/12 - PleurX catheter placed to water seal with on pneumothorax on follow-up CXR  01/13 - RRT for hypoxemia, placed on BiPAP, PleurX catheter placed back to suction, started vancomycin & Zosyn for possible HCAP  01/14 - noted to have minimal output from PleurX catheter, lung ultrasound with large left pleural effusion concerning for hemothorax, multiple episodes of bradycardia secondary to hypoxemia, remains on BiPAP  01/15 - worsening respiratory distress requiring intubation, hypotensive requiring vasopressor support, CT chest with large left hemothorax w/ trace pneumothorax & extensive subcutaneous emphysema so taken to OR emergently for left VATS, evacuation fo 2 L of clot, and placement of two left chest tubes  01/16 - extubated, weaned off vasopressor support  01/17 - two left chest tubes placed to water seal with no pneumothorax noted on follow-up CXR        24 HOUR EVENTS:  - Martinez discontinued  - Cosyntropin test administered   - Placed on nocturnal BiPAP     SUBJECTIVE/ROS:  [ ] A ten-point review of systems was otherwise negative except as noted.  [ ] Due to altered mental status/intubation, subjective information were not able to be obtained from the patient. History was obtained, to the extent possible, from review of the chart and collateral sources of information.      NEURO  Exam: awake, alert, oriented  Meds: acetaminophen   Tablet .. 975 milliGRAM(s) Oral every 6 hours PRN Mild Pain (1 - 3)  ondansetron Injectable 4 milliGRAM(s) IV Push every 6 hours PRN Nausea and/or Vomiting  opium Tincture 6 milliGRAM(s) Oral <User Schedule>    [x] Adequacy of sedation and pain control has been assessed and adjusted      RESPIRATORY  RR: 20 (01-27-20 @ 04:00) (13 - 37)  SpO2: 98% (01-27-20 @ 04:00) (95% - 100%)  Exam: unlabored, clear to auscultation bilaterally  Mechanical Ventilation: none  [N/A] Extubation Readiness Assessed  Meds: albuterol/ipratropium for Nebulization. 3 milliLiter(s) Nebulizer every 6 hours  buDESOnide    Inhalation Suspension 0.5 milliGRAM(s) Inhalation every 12 hours        CARDIOVASCULAR  HR: 70 (01-27-20 @ 04:00) (68 - 96)  BP: 103/57 (01-27-20 @ 04:00) (79/47 - 134/60)  BP(mean): 78 (01-27-20 @ 04:00) (58 - 89)  VBG - ( 26 Jan 2020 01:33 )  pH: 7.36  /  pCO2: 71    /  pO2: 42    / HCO3: 39    / Base Excess: 12.5  /  SaO2: 75     Lactate: 0.7    Exam: regular rate and rhythm  Cardiac Rhythm: sinus  Perfusion     [x]Adequate   [ ]Inadequate  Mentation   [x]Normal       [ ]Reduced  Extremities  [x]Warm         [ ]Cool  Volume Status [ ]Hypervolemic [x]Euvolemic [ ]Hypovolemic  Meds: tamsulosin 0.4 milliGRAM(s) Oral daily        GI/NUTRITION  Exam: soft, nontender, nondistended  Diet: mechanical soft diet  Meds: diphenoxylate/atropine 2 Tablet(s) Oral <User Schedule>  loperamide 16 milliGRAM(s) Oral <User Schedule>  pantoprazole    Tablet 40 milliGRAM(s) Oral before breakfast  psyllium Powder 1 Packet(s) Oral three times a day      GENITOURINARY  I&O's Detail    01-25 @ 07:01 - 01-26 @ 07:00  --------------------------------------------------------  IN:    fat emulsion (Fish Oil and Plant Based) 20% Infusion: 175 mL    Lactated Ringers IV Bolus: 1000 mL    Oral Fluid: 810 mL    Packed Red Blood Cells: 350 mL    Solution: 100 mL    TPN (Total Parenteral Nutrition): 1200 mL  Total IN: 3635 mL    OUT:    Ileostomy: 1470 mL    Indwelling Catheter - Urethral: 1745 mL  Total OUT: 3215 mL    Total NET: 420 mL      01-26 @ 07:01  -  01-27 @ 04:24  --------------------------------------------------------  IN:    fat emulsion (Fish Oil and Plant Based) 20% Infusion: 112.5 mL    Oral Fluid: 850 mL    Solution: 325 mL    TPN (Total Parenteral Nutrition): 1000 mL  Total IN: 2287.5 mL    OUT:    Ileostomy: 500 mL    Indwelling Catheter - Urethral: 65 mL    Voided: 300 mL  Total OUT: 865 mL    Total NET: 1422.5 mL          01-27    133<L>  |  93<L>  |  21  ----------------------------<  155<H>  4.9   |  32<H>  |  0.37<L>    Ca    7.9<L>      27 Jan 2020 00:14  Phos  3.0     01-27  Mg     1.9     01-27    TPro  5.7<L>  /  Alb  2.3<L>  /  TBili  0.3  /  DBili  0.2  /  AST  21  /  ALT  29  /  AlkPhos  74  01-27    [ ] Martinez catheter, indication: N/A  Meds: ergocalciferol 58382 Unit(s) Oral every week  fat emulsion (Fish Oil and Plant Based) 20% Infusion 12.5 mL/Hr IV Continuous <Continuous>  Parenteral Nutrition - Adult 1 Each TPN Continuous <Continuous>        HEMATOLOGIC  Meds: enoxaparin Injectable 40 milliGRAM(s) SubCutaneous daily    [x] VTE Prophylaxis                        8.0    8.92  )-----------( 275      ( 27 Jan 2020 00:14 )             25.5       Transfusion     [ ] PRBC   [ ] Platelets   [ ] FFP   [ ] Cryoprecipitate      INFECTIOUS DISEASES  WBC Count: 8.92 K/uL (01-27 @ 00:14)  WBC Count: 7.42 K/uL (01-26 @ 05:32)    RECENT CULTURES:  Specimen Source: .Blood Blood  Date/Time: 01-26 @ 03:52  Culture Results:   No growth to date.  Gram Stain: --  Organism: --    Meds:       ENDOCRINE  CAPILLARY BLOOD GLUCOSE      POCT Blood Glucose.: 134 mg/dL (26 Jan 2020 23:01)  POCT Blood Glucose.: 134 mg/dL (26 Jan 2020 18:03)  POCT Blood Glucose.: 107 mg/dL (26 Jan 2020 12:16)  POCT Blood Glucose.: 120 mg/dL (26 Jan 2020 06:04)    Meds: insulin lispro (HumaLOG) corrective regimen sliding scale   SubCutaneous every 6 hours  octreotide  Injectable 100 MICROGram(s) SubCutaneous three times a day        ACCESS DEVICES:  [x] Peripheral IV  [ ] Central Venous Line	[ ] R	[ ] L	[ ] IJ	[ ] Fem	[ ] SC	Placed:   [ ] Arterial Line		[ ] R	[ ] L	[ ] Fem	[ ] Rad	[ ] Ax	Placed:   [ ] PICC:					[ ] Mediport  [ ] Urinary Catheter, Date Placed:   [x] Necessity of urinary, arterial, and venous catheters discussed    OTHER MEDICATIONS:  chlorhexidine 2% Cloths 1 Application(s) Topical <User Schedule>      CODE STATUS: full code       IMAGING: < from: CT Abdomen and Pelvis w/ IV Cont (01.15.20 @ 13:25) >  FINDINGS:    CHEST:     LUNGS AND LARGE AIRWAYS: There is complete atelectasis of the left lower lobe and partial atelectasis of the left upper lobe. Partial compressive atelectasis of the right lower lobe. Emphysema. Nodular opacities in the right lower lobe, unchanged.  PLEURA: There is a left chest tubes. There is a large loculated left pleural effusion with areas of high attenuation, increased in size. Trace left pneumothorax. There is a small to moderate right pleural effusion.  VESSELS: Atherosclerotic changes of the aorta and coronary arteries. Right subclavian approach PICC tip in the SVC.  HEART: Heart size is normal. No pericardial effusion.  MEDIASTINUM AND MIKI: No lymphadenopathy.  CHEST WALL AND LOWER NECK: Extensive subcutaneous emphysema along the left chest wall. In addition, high attenuation is noted in the left chest wall adjacent to the pleural catheter measuring approximately 3.4 x 2.1 cm (3:146), suspicious for hematoma.    ABDOMEN AND PELVIS:    LIVER: Subcentimeter hypodense focus in the right hepatic lobe, too small to characterize, but unchanged.  BILE DUCTS: Normal caliber.  GALLBLADDER: Within normal limits.  SPLEEN: Within normal limits.  PANCREAS: Within normal limits.  ADRENALS: Within normal limits.  KIDNEYS/URETERS:Mild hydronephrosis bilaterally. Bilateral nonobstructing calculi.    BLADDER: Markedly distended urinary bladder.  REPRODUCTIVE ORGANS: Prostatectomy.    BOWEL: Right lower quadrant ileostomy. No bowel obstruction. Colonic diverticulosis.  PERITONEUM: Trace ascites. Multiple nonspecific peritoneal calcifications, unchanged. A small droplet of gas in the right anterior abdomen adjacent to the ileostomy (3:208 and 5:43) may be extraluminal.  VESSELS: Atherosclerotic changes.  RETROPERITONEUM/LYMPHNODES: No lymphadenopathy.    ABDOMINAL WALL: Diffuse anasarca. Postsurgical changes of the anterior abdominal wall. The previous reported fluid midline abdominal wall collection has resolved.   BONES: Degenerative changes in the spine.    IMPRESSION:     Loculated large left pleural effusion with areas of hyperattenuation likely secondary to hematoma. Interval increase in size of the loculated left pleural effusion since 12/30/2019. Left sided chest tube terminating in the pleural space. Trace left pneumothorax.     Extensive left subcutaneous emphysema. Hematoma is also noted along the left lateral chest wall.    Interval resolution of the midline anterior abdominal wall fluid collection.    Markedly distended urinary bladder with mild bilateral hydronephrosis.    A small droplet of gas in the right anterior abdomen adjacent to the ileostomy may be extraluminal. Repeat CT with oral contrast may be helpful for further evaluation.      < end of copied text > HISTORY  75y Male past medical history of COPD and EtOH dependence who presented on 12/6/2019 with abdominal pain, nausea, hematemesis, and poor PO intake for ~2-3 days. In the ED, he was found to be hypotensive & tachycardic. Labs revealed an CHI and lactic acidosis. Imaging revealed a high grade SBO in the RLQ on CT scan. Hospital course is as follows:  12/06 - s/p exploratory laparotomy, lysis of adhesions, decompression of bowel via enterotomy w/ primary repair, and Abthera VAC placement as the distal 50% of the bowel appeared dusky but was still viable, admitted to SICU post-operatively as he was on vasopressor support and was left intubated  12/08 - s/p re-exploration, proximal 165 cm of small bowel appeared pink & viable but beyond that had patchy areas of ischemia so decision was made to give the bowel more time to demarcate before resecting in order to preserve as much small bowel as possible so Abthera VAC was replaced  12/09 - s/p re-exploration, small bowel resection of 150 cm (150 cm remaining), ileocecetomy, end ileostomy, mucous fistula, and abdominal closure  12/11 - extubated to BiPAP, noted to be in SVT that was rate controlled w/ metoprolol  12/14 - started on TPN  12/15 - noted to have spontaneous left pneumothorax s/p left pigtail catheter placement, noted to be in SVT again so amiodarone started, high ileostomy output noted so concern for short bowel syndrome  12/16 - amiodarone discontinued, started on beta blocker with metoprolol  12/18 - started Lomotil and ileostomy repletions with IV fluids  12/19 - started Imodium, left pigtail catheter was placed to water seal but pneumothorax noted on follow-up CXR so placed back to suction  12/20 - started tincture of opium, concern for aspiration so changed diet from regular to dysphagia 1 pureed with nectar-thickened liquids  12/22 - left pigtail catheter placed to water seal with no pneumothorax noted on follow-up CXR, CT chest with moderate left pleural effusion not being drained by pigtail catheter  12/23 - 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  12/24 - left pigtail catheter discontinued  12/28 - started Ancef for erythematous midline wound  12/29 - beta blocker discontinued for intermittent episodes of hypotension  12/30 - left pigtail catheter placement by IR with drainage of serous transudative fluid  01/02 - FEES demonstrating penetration with thin liquids so patient kept on dysphagia 1 pureed with nectar-thickened liquids  01/07 - Transferred to floors  01/10 - s/p left VATS w/ PleurX catheter placement  01/11 - evaluated by speech & swallow, advanced diet to mechanical soft with thin liquids  01/12 - PleurX catheter placed to water seal with on pneumothorax on follow-up CXR  01/13 - RRT for hypoxemia, placed on BiPAP, PleurX catheter placed back to suction, started vancomycin & Zosyn for possible HCAP  01/14 - noted to have minimal output from PleurX catheter, lung ultrasound with large left pleural effusion concerning for hemothorax, multiple episodes of bradycardia secondary to hypoxemia, remains on BiPAP  01/15 - worsening respiratory distress requiring intubation, hypotensive requiring vasopressor support, CT chest with large left hemothorax w/ trace pneumothorax & extensive subcutaneous emphysema so taken to OR emergently for left VATS, evacuation fo 2 L of clot, and placement of two left chest tubes  01/16 - extubated, weaned off vasopressor support  01/17 - two left chest tubes placed to water seal with no pneumothorax noted on follow-up CXR      24 HOUR EVENTS:  - Martinez discontinued  - Cosyntropin test administered   - Placed on nocturnal BiPAP     SUBJECTIVE/ROS:  [x ] A ten-point review of systems was otherwise negative except as noted.  [ ] Due to altered mental status/intubation, subjective information were not able to be obtained from the patient. History was obtained, to the extent possible, from review of the chart and collateral sources of information.      NEURO  Exam: awake, alert, oriented  Meds: acetaminophen   Tablet .. 975 milliGRAM(s) Oral every 6 hours PRN Mild Pain (1 - 3)  ondansetron Injectable 4 milliGRAM(s) IV Push every 6 hours PRN Nausea and/or Vomiting  opium Tincture 6 milliGRAM(s) Oral <User Schedule>    [x] Adequacy of sedation and pain control has been assessed and adjusted      RESPIRATORY  RR: 20 (01-27-20 @ 04:00) (13 - 37)  SpO2: 98% (01-27-20 @ 04:00) (95% - 100%)  Exam: unlabored, clear to auscultation bilaterally  [N/A] Extubation Readiness Assessed  Meds: albuterol/ipratropium for Nebulization. 3 milliLiter(s) Nebulizer every 6 hours  buDESOnide    Inhalation Suspension 0.5 milliGRAM(s) Inhalation every 12 hours        CARDIOVASCULAR  HR: 70 (01-27-20 @ 04:00) (68 - 96)  BP: 103/57 (01-27-20 @ 04:00) (79/47 - 134/60)  BP(mean): 78 (01-27-20 @ 04:00) (58 - 89)  VBG - ( 26 Jan 2020 01:33 )  pH: 7.36  /  pCO2: 71    /  pO2: 42    / HCO3: 39    / Base Excess: 12.5  /  SaO2: 75     Lactate: 0.7    Exam: regular rate and rhythm  Cardiac Rhythm: sinus  Perfusion     [x]Adequate   [ ]Inadequate  Mentation   [x]Normal       [ ]Reduced  Extremities  [x]Warm         [ ]Cool  Volume Status [ ]Hypervolemic [x]Euvolemic [ ]Hypovolemic  Meds: tamsulosin 0.4 milliGRAM(s) Oral daily        GI/NUTRITION  Exam: soft, nontender, nondistended  Diet: mechanical soft diet with TPN @ 50 ml/hr  Meds: diphenoxylate/atropine 2 Tablet(s) Oral <User Schedule>  loperamide 16 milliGRAM(s) Oral <User Schedule>  pantoprazole    Tablet 40 milliGRAM(s) Oral before breakfast  psyllium Powder 1 Packet(s) Oral three times a day      GENITOURINARY  I&O's Detail    01-25 @ 07:01  -  01-26 @ 07:00  --------------------------------------------------------  IN:    fat emulsion (Fish Oil and Plant Based) 20% Infusion: 175 mL    Lactated Ringers IV Bolus: 1000 mL    Oral Fluid: 810 mL    Packed Red Blood Cells: 350 mL    Solution: 100 mL    TPN (Total Parenteral Nutrition): 1200 mL  Total IN: 3635 mL    OUT:    Ileostomy: 1470 mL    Indwelling Catheter - Urethral: 1745 mL  Total OUT: 3215 mL    Total NET: 420 mL      01-26 @ 07:01  -  01-27 @ 04:24  --------------------------------------------------------  IN:    fat emulsion (Fish Oil and Plant Based) 20% Infusion: 112.5 mL    Oral Fluid: 850 mL    Solution: 325 mL    TPN (Total Parenteral Nutrition): 1000 mL  Total IN: 2287.5 mL    OUT:    Ileostomy: 500 mL    Indwelling Catheter - Urethral: 65 mL    Voided: 300 mL  Total OUT: 865 mL    Total NET: 1422.5 mL          01-27    133<L>  |  93<L>  |  21  ----------------------------<  155<H>  4.9   |  32<H>  |  0.37<L>    Ca    7.9<L>      27 Jan 2020 00:14  Phos  3.0     01-27  Mg     1.9     01-27    TPro  5.7<L>  /  Alb  2.3<L>  /  TBili  0.3  /  DBili  0.2  /  AST  21  /  ALT  29  /  AlkPhos  74  01-27    [ ] Martinez catheter, indication: N/A  Meds: ergocalciferol 31524 Unit(s) Oral every week  fat emulsion (Fish Oil and Plant Based) 20% Infusion 12.5 mL/Hr IV Continuous <Continuous>  Parenteral Nutrition - Adult 1 Each TPN Continuous <Continuous>        HEMATOLOGIC  Meds: enoxaparin Injectable 40 milliGRAM(s) SubCutaneous daily    [x] VTE Prophylaxis                        8.0    8.92  )-----------( 275      ( 27 Jan 2020 00:14 )             25.5       Transfusion     [ ] PRBC   [ ] Platelets   [ ] FFP   [ ] Cryoprecipitate      INFECTIOUS DISEASES  WBC Count: 8.92 K/uL (01-27 @ 00:14)  WBC Count: 7.42 K/uL (01-26 @ 05:32)    RECENT CULTURES:  Specimen Source: .Blood Blood  Date/Time: 01-26 @ 03:52  Culture Results:   No growth to date.    Meds: x      ENDOCRINE  CAPILLARY BLOOD GLUCOSE  POCT Blood Glucose.: 134 mg/dL (26 Jan 2020 23:01)  POCT Blood Glucose.: 134 mg/dL (26 Jan 2020 18:03)  POCT Blood Glucose.: 107 mg/dL (26 Jan 2020 12:16)  POCT Blood Glucose.: 120 mg/dL (26 Jan 2020 06:04)    Meds: insulin lispro (HumaLOG) corrective regimen sliding scale   SubCutaneous every 6 hours  octreotide  Injectable 100 MICROGram(s) SubCutaneous three times a day        ACCESS DEVICES:  [x] Peripheral IV  [ ] Central Venous Line	[ ] R	[ ] L	[ ] IJ	[ ] Fem	[ ] SC	Placed:   [ ] Arterial Line		[ ] R	[ ] L	[ ] Fem	[ ] Rad	[ ] Ax	Placed:   [ ] PICC:					[ ] Mediport  [ ] Urinary Catheter, Date Placed:   [x] Necessity of urinary, arterial, and venous catheters discussed    OTHER MEDICATIONS:  chlorhexidine 2% Cloths 1 Application(s) Topical <User Schedule>      CODE STATUS: full code       IMAGING: < from: CT Abdomen and Pelvis w/ IV Cont (01.15.20 @ 13:25) >  FINDINGS:    CHEST:     LUNGS AND LARGE AIRWAYS: There is complete atelectasis of the left lower lobe and partial atelectasis of the left upper lobe. Partial compressive atelectasis of the right lower lobe. Emphysema. Nodular opacities in the right lower lobe, unchanged.  PLEURA: There is a left chest tubes. There is a large loculated left pleural effusion with areas of high attenuation, increased in size. Trace left pneumothorax. There is a small to moderate right pleural effusion.  VESSELS: Atherosclerotic changes of the aorta and coronary arteries. Right subclavian approach PICC tip in the SVC.  HEART: Heart size is normal. No pericardial effusion.  MEDIASTINUM AND MIKI: No lymphadenopathy.  CHEST WALL AND LOWER NECK: Extensive subcutaneous emphysema along the left chest wall. In addition, high attenuation is noted in the left chest wall adjacent to the pleural catheter measuring approximately 3.4 x 2.1 cm (3:146), suspicious for hematoma.    ABDOMEN AND PELVIS:    LIVER: Subcentimeter hypodense focus in the right hepatic lobe, too small to characterize, but unchanged.  BILE DUCTS: Normal caliber.  GALLBLADDER: Within normal limits.  SPLEEN: Within normal limits.  PANCREAS: Within normal limits.  ADRENALS: Within normal limits.  KIDNEYS/URETERS:Mild hydronephrosis bilaterally. Bilateral nonobstructing calculi.    BLADDER: Markedly distended urinary bladder.  REPRODUCTIVE ORGANS: Prostatectomy.    BOWEL: Right lower quadrant ileostomy. No bowel obstruction. Colonic diverticulosis.  PERITONEUM: Trace ascites. Multiple nonspecific peritoneal calcifications, unchanged. A small droplet of gas in the right anterior abdomen adjacent to the ileostomy (3:208 and 5:43) may be extraluminal.  VESSELS: Atherosclerotic changes.  RETROPERITONEUM/LYMPHNODES: No lymphadenopathy.    ABDOMINAL WALL: Diffuse anasarca. Postsurgical changes of the anterior abdominal wall. The previous reported fluid midline abdominal wall collection has resolved.   BONES: Degenerative changes in the spine.    IMPRESSION:     Loculated large left pleural effusion with areas of hyperattenuation likely secondary to hematoma. Interval increase in size of the loculated left pleural effusion since 12/30/2019. Left sided chest tube terminating in the pleural space. Trace left pneumothorax.     Extensive left subcutaneous emphysema. Hematoma is also noted along the left lateral chest wall.    Interval resolution of the midline anterior abdominal wall fluid collection.    Markedly distended urinary bladder with mild bilateral hydronephrosis.    A small droplet of gas in the right anterior abdomen adjacent to the ileostomy may be extraluminal. Repeat CT with oral contrast may be helpful for further evaluation.      < end of copied text >

## 2020-01-27 NOTE — PROGRESS NOTE ADULT - SUBJECTIVE AND OBJECTIVE BOX
Subjective: Patient seen and examined. No new events except as noted.   remains in ICU   feels ok     REVIEW OF SYSTEMS:    CONSTITUTIONAL:+ weakness, fevers or chills  EYES/ENT: No visual changes;  No vertigo or throat pain   NECK: No pain or stiffness  RESPIRATORY: No cough, wheezing, hemoptysis; No shortness of breath  CARDIOVASCULAR: No chest pain or palpitations  GASTROINTESTINAL: No abdominal or epigastric pain. No nausea, vomiting, or hematemesis; No diarrhea or constipation. No melena or hematochezia.  GENITOURINARY: No dysuria, frequency or hematuria  NEUROLOGICAL: No numbness or weakness  SKIN: No itching, burning, rashes, or lesions   All other review of systems is negative unless indicated above.    MEDICATIONS:  MEDICATIONS  (STANDING):  albuterol/ipratropium for Nebulization. 3 milliLiter(s) Nebulizer every 6 hours  buDESOnide    Inhalation Suspension 0.5 milliGRAM(s) Inhalation every 12 hours  chlorhexidine 2% Cloths 1 Application(s) Topical <User Schedule>  diphenoxylate/atropine 2 Tablet(s) Oral <User Schedule>  enoxaparin Injectable 40 milliGRAM(s) SubCutaneous daily  ergocalciferol 47439 Unit(s) Oral every week  fat emulsion (Fish Oil and Plant Based) 20% Infusion 12.5 mL/Hr (12.5 mL/Hr) IV Continuous <Continuous>  loperamide 16 milliGRAM(s) Oral <User Schedule>  octreotide  Injectable 100 MICROGram(s) SubCutaneous three times a day  opium Tincture 6 milliGRAM(s) Oral <User Schedule>  pantoprazole    Tablet 40 milliGRAM(s) Oral before breakfast  Parenteral Nutrition - Adult 1 Each (50 mL/Hr) TPN Continuous <Continuous>  psyllium Powder 1 Packet(s) Oral three times a day  tamsulosin 0.4 milliGRAM(s) Oral daily      PHYSICAL EXAM:  T(C): 37.4 (01-27-20 @ 19:00), Max: 37.4 (01-27-20 @ 07:00)  HR: 76 (01-27-20 @ 20:17) (68 - 92)  BP: 121/58 (01-27-20 @ 20:00) (91/51 - 133/65)  RR: 15 (01-27-20 @ 20:00) (14 - 31)  SpO2: 100% (01-27-20 @ 20:17) (90% - 100%)  Wt(kg): --  I&O's Summary    26 Jan 2020 07:01  -  27 Jan 2020 07:00  --------------------------------------------------------  IN: 2412.5 mL / OUT: 2365 mL / NET: 47.5 mL    27 Jan 2020 07:01  -  27 Jan 2020 21:01  --------------------------------------------------------  IN: 1620 mL / OUT: 2750 mL / NET: -1130 mL          Appearance: NAD  HEENT:   Normal oral mucosa, PERRL, EOMI	  Lymphatic: No lymphadenopathy , no edema  Cardiovascular: Normal S1 S2, No JVD, No murmurs , Peripheral pulses palpable 2+ bilaterally  Respiratory: Lungs clear to auscultation, normal effort 	  Gastrointestinal:  Soft, Non-tender, + Colostomy   Skin: No rashes, No ecchymoses, No cyanosis, warm to touch  Musculoskeletal: Normal range of motion, normal strength  Psychiatry:  Mood & affect appropriate  Ext: No edema      LABS:    CARDIAC MARKERS:  CARDIAC MARKERS ( 26 Jan 2020 02:08 )  x     / x     / 31 U/L / x     / 4.2 ng/mL                                8.0    8.92  )-----------( 275      ( 27 Jan 2020 00:14 )             25.5     01-27    133<L>  |  93<L>  |  21  ----------------------------<  155<H>  4.9   |  32<H>  |  0.37<L>    Ca    7.9<L>      27 Jan 2020 00:14  Phos  3.0     01-27  Mg     1.9     01-27    TPro  5.7<L>  /  Alb  2.3<L>  /  TBili  0.3  /  DBili  0.2  /  AST  21  /  ALT  29  /  AlkPhos  74  01-27    proBNP:   Lipid Profile:   HgA1c:   TSH:     2          TELEMETRY: 	  SR  ECG:  	  RADIOLOGY:   DIAGNOSTIC TESTING:  [ ] Echocardiogram:  [ ]  Catheterization:  [ ] Stress Test:    OTHER:

## 2020-01-27 NOTE — PROGRESS NOTE ADULT - ASSESSMENT
73 y/o male presenting with high grade SBO s/p exploratory laparotomy, lysis of adhesions, decompression of bowel via enterotomy w/ primary repair, & Abthera VAC placement (12/06/2019); RTOR for re-exploration w/ Abthera VAC replacement (12/08/2019) to allow for demarcation of ischemic small bowel; RTOR for re-exploration, small bowel resection of 150 cm (150 cm remaining), ileocecetomy, end ileostomy, mucous fistula, and abdominal closure (12/10/2019); hospital course complicated by SVT, short bowel syndrome requiring repletions w/ IV fluids, malnutrition requiring TPN, intermittent episodes of hypotension, spontaneous left pneumothorax s/p pigtail catheter (12/15/2019-12/24/2019), left pleural effusion s/p pigtail catheter w/ IR (12/30/2019), dysphagia, and oral HSV lesions, placement of Pleurx on 1/10, now re-admitted to SICU with acute hypoxic respiratory failure s/p repeat VATS with 2 liter hemothorax removed. Respiratory failure resolved, now stable in SICU.    Neuro: no acute issues  - Pain control as needed with acetaminophen    Resp: COPD, spontaneous left pneumothorax s/p pigtail catheter, left pleural effusion s/p Pleurx 1/10.  Intubated for respiratory distress s/p VATS 01/15 with 2 Liter of blood evacuation   - CT tube discontinued  - Pulmicort and Duoneb for COPD  - Will need outpatient follow-up with a pulmonologist for his COPD as patient does not currently have one  - daily CXR    CV: Bradycardia, SVT.  - No interval dysrhythmias     GI: s/p exploratory laparotomy, Grecia, decompression of bowel via enterotomy w/ primary repair, & Abthera VAC placement (12/06/2019); re-exploration w/ ABthera VAC replacement (12/08/2019); re-exploration, small bowel resection of 150 cm (150 cm remaining), ileocecectomy end ileostomy, mucous fistula, and abdominal closure (12/10/2019); short bowel syndrome, malnutrition, dysphagia  - Dysphagia diet + TPN  - Monitor ostomy output  - Continue Lomotil, tincture of opium, loperamide, and octreotide for short bowel syndrome  - Protonix to decrease gastric secretions      Renal: no acute issues  - Monitor I&Os  - Monitor electrolytes and replete as necessary  - salt tabs discontinued    Heme: anemia likely secondary to malnutrition / malabsorption  - Monitor CBC and coags, s/p 1u PRBC overnight for drop in H/h  - Q12 CBCs   - Lovenox for VTE prophylaxis    ID: oral HSV lesions (s/p acyclovir 12/23/2019-01/02/2020)  - Combicath 1/16 with pseudomonas resistant to Zosyn  - bcx 1/16 NGTD  - f/u bcx 1/25      Endo: no acute issues  - Monitor glucose before meals & at bedtime while on TPN  - Insulin sliding scale.    Disposition:  - Will remain in SICU 73 y/o male presenting with high grade SBO s/p exploratory laparotomy, lysis of adhesions, decompression of bowel via enterotomy w/ primary repair, & Abthera VAC placement (12/06/2019); RTOR for re-exploration w/ Abthera VAC replacement (12/08/2019) to allow for demarcation of ischemic small bowel; RTOR for re-exploration, small bowel resection of 150 cm (150 cm remaining), ileocecetomy, end ileostomy, mucous fistula, and abdominal closure (12/10/2019); hospital course complicated by SVT, short bowel syndrome requiring repletions w/ IV fluids, malnutrition requiring TPN, intermittent episodes of hypotension, spontaneous left pneumothorax s/p pigtail catheter (12/15/2019-12/24/2019), left pleural effusion s/p pigtail catheter w/ IR (12/30/2019), dysphagia, and oral HSV lesions, placement of Pleurx on 1/10, now re-admitted to SICU with acute hypoxic respiratory failure s/p repeat VATS with 2 liter hemothorax removed. Respiratory failure resolved, now stable in SICU.    Neuro: no acute issues  - Pain control as needed with acetaminophen    Resp: COPD, spontaneous left pneumothorax s/p pigtail catheter, left pleural effusion s/p Pleurx 1/10.  Intubated for respiratory distress s/p VATS 01/15 with 2 Liter of blood evacuation s/p removal of chest tubes   - Pulmicort and Duoneb for COPD  - Will need outpatient follow-up with a pulmonologist for his COPD as patient does not currently have one  - Daily CXR    CV: Bradycardia, SVT.  - No interval dysrhythmias     GI: s/p exploratory laparotomy, Grecia, decompression of bowel via enterotomy w/ primary repair, & Abthera VAC placement (12/06/2019); re-exploration w/ ABthera VAC replacement (12/08/2019); re-exploration, small bowel resection of 150 cm (150 cm remaining), ileocecectomy end ileostomy, mucous fistula, and abdominal closure (12/10/2019); short bowel syndrome, malnutrition, dysphagia  - Mechanical soft diet + TPN  - Monitor ostomy output  - Continue Lomotil, tincture of opium, loperamide, metamucil, and octreotide for short bowel syndrome  - Protonix to decrease gastric secretions    Renal: 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    Disposition: Will remain in SICU.

## 2020-01-27 NOTE — PROGRESS NOTE ADULT - ASSESSMENT
A/P: 75 year old male w/ PMH of COPD and EtOH dependence with PSH/o appy, prostate surgery, & spine surgery  septic shock and high grade SBO s/p exploratory laparotomy, lysis of adhesions, decompression of bowel via enterotomy w/ primary repair, and Abthera VAC placement on 12/6; s/p take down of Abthera, washout and re-application of the Abthera vac on 12/8. Now s/p SBR and end ileostomy on 12/10.   TPN consulted to assist w/ management of pt's nutrition in pt w/ prolonged hospital course now tolerating diet but has high Ileostomy output.  Pt s/p Lt Chest Pigtail for effusion in IR with persistent high output on 1/10/2020 pt had VATs & placement of Left PleurX catheter. Pt op pt developed hemothorax and Respiratory Distress.  Pt is s/p Lt chest Evacuation of 2L blood w/ placement of CT x2 on 1/15/2020 for Lt Pleural Effusion that's resolving and doing well after CT removed and Improving Rt PNA vs Pleural Effusion.     Pt remains in SICU w/improving High Ostomy Output, and improving Hypotension w/o evidence of Sepsis.   Plan to Reverse Ileostomy in February, possibly next week  Pt w/ improving severe Protein-Calorie Malnutrition-could be a combination of  Short Gut Syndrome and Malabsorption        TPN doing well at 1/2 Goal- pt eating sufficiently to make up approximately 1/2 of nutritional goals            Amino Acids 60g, Dextrose 140g, and Lipids 30g in 1200mL with 3mL MTE & 10mL MVI        Pt tolerating Soft Mechanical w/ Ensure & Prosource supplements             Improving Ostomy Output and improving effusion w/ chest tubes removed - decreasing nutritional losses  HypoCa- increased Ca in TPN to 14mEq- continue to monitor         Improved Ca levels can additionally assist w/ improving BP  HypoPhos- improving w/ NaPhos 45mMol in TPN & will continue to monitor   Fecal fat testing suggestive of decreased absorption of fat - TPN restarted w/ MVI          Supplements added to compensate for losses    Vit D levels low- Ergocalciferol supplements started    Vit A low- consider Vit A    Vit K INR/ PT near normal suggestive that it is being absorbed    VIt E Alpha Tocopherol- normal & beta-gamma-Tocopherol - low - consider VIt E  High Urine Output- low probability of DI as Na normal, pt has also been diuresed and fluid restricted          Consider NaCl tabs 3g TID PO & stop ostomy fluid repletion  Strict Intake and Output -             Increased ostomy output-            Continue to monitor while on octreotide, lomotil, tincture of opium, & imodium  BLE Edema- will continue to monitor - decreased Na & volume in TPN given  Leukocytosis- improving - Meropenum as per ID  Hyperglycemia-improving      Fingersticks & ISS coverage - as per SICU  To continue monitoring of nutrition - Weights three times a week; Daily CMP, Mg, Ionized Ca, Phosphorus,        and Weekly Triglycerides and Pre-albumin  Continue as per SICU/Surgery, will follow with you, D/w primary team    Andreina Hubbard PA-C  TPN team, pager [phone]  D/w Dr Chacko A/P: 75 year old male w/ PMH of COPD and EtOH dependence with PSH/o appy, prostate surgery, & spine surgery  septic shock and high grade SBO s/p exploratory laparotomy, lysis of adhesions, decompression of bowel via enterotomy w/ primary repair, and Abthera VAC placement on 12/6; s/p take down of Abthera, washout and re-application of the Abthera vac on 12/8. Now s/p SBR and end ileostomy on 12/10.   TPN consulted to assist w/ management of pt's nutrition in pt w/ prolonged hospital course now tolerating diet but has high Ileostomy output.  Pt s/p Lt Chest Pigtail for effusion in IR with persistent high output on 1/10/2020 pt had VATs & placement of Left PleurX catheter. Pt op pt developed hemothorax and Respiratory Distress.  Pt is s/p Lt chest Evacuation of 2L blood w/ placement of CT x2 on 1/15/2020 for Lt Pleural Effusion that's resolving and doing well after CT removed and Improving Rt PNA vs Pleural Effusion.     Pt remains in SICU w/improving High Ostomy Output, and improving Hypotension w/o evidence of Sepsis.   Plan to Reverse Ileostomy in February, possibly next week  Pt w/ improving severe Protein-Calorie Malnutrition-could be a combination of  Short Gut Syndrome and Malabsorption        TPN doing well at 1/2 Goal- pt eating sufficiently to make up approximately 1/2 of nutritional goals            Amino Acids 60g, Dextrose 140g, and Lipids 30g in 1200mL with 3mL MTE & 10mL MVI        Pt tolerating Soft Mechanical w/ Ensure & Prosource supplements             Improving Ostomy Output and improving effusion w/ chest tubes removed - decreasing nutritional losses  HypoCa- increased Ca in TPN to 14mEq- continue to monitor         Improved Ca levels can additionally assist w/ improving BP- pt w/ hypotension over the weekend  HypoPhos- improving w/ NaPhos 45mMol in TPN & will continue to monitor   Fecal fat testing suggestive of decreased absorption of fat - TPN w/ MVI to compensate for low Vit E & A         TPN/ PO Supplements added to make up for losses         Vit D levels low- Ergocalciferol supplements started    Vit K INR/ PT near normal suggestive that it is being absorbed  Decreased Urine Output- low probability of DI as Na normal,  DDAVP x2 =decreased urine output        NaCl tabs & ostomy fluid repletion stopped after appropriate response  Leukocytosis- improving - observed off abx        hypotension resolved- responded to fluid bolus        blood cx ntd   Strict Intake and Output -            Improving ostomy output           Continue to monitor while on octreotide, lomotil, tincture of opium, & imodium  BLE Edema- improving - decreased Na & volume in TPN given, diuresis   Hyperglycemia-improving      Fingersticks & ISS coverage - as per SICU  To continue monitoring of nutrition - Weights three times a week; Daily CMP, Mg, Ionized Ca, Phosphorus,        and Weekly Triglycerides and Pre-albumin  Continue as per SICU/Surgery, will follow with you, D/w primary team    Andreina Hubbard PA-C  TPN team, pager [phone]  D/w Dr Chacko A/P: 75 year old male w/ PMH of COPD and EtOH dependence with PSH/o appy, prostate surgery, & spine surgery  septic shock and high grade SBO s/p exploratory laparotomy, lysis of adhesions, decompression of bowel via enterotomy w/ primary repair, and Abthera VAC placement on 12/6; s/p take down of Abthera, washout and re-application of the Abthera vac on 12/8. Now s/p SBR and end ileostomy on 12/10.   TPN consulted to assist w/ management of pt's nutrition in pt w/ prolonged hospital course now tolerating diet but has high Ileostomy output.  Pt s/p Lt Chest Pigtail for effusion in IR with persistent high output on 1/10/2020 pt had VATs & placement of Left PleurX catheter. Pt op pt developed hemothorax and Respiratory Distress.  Pt is s/p Lt chest Evacuation of 2L blood w/ placement of CT x2 on 1/15/2020 for Lt Pleural Effusion that's resolving and doing well after CT removed and Improving Rt PNA vs Pleural Effusion.     Pt remains in SICU w/improving High Ostomy Output, and improving Hypotension w/o evidence of Sepsis.   Plan to Reverse Ileostomy in February, possibly next week  Pt w/ improving severe Protein-Calorie Malnutrition-could be a combination of  Short Gut Syndrome and Malabsorption        TPN doing well at 1/2 Goal- pt eating sufficiently to make up approximately 1/2 of nutritional goals            Amino Acids 60g, Dextrose 140g, and Lipids 30g in 1200mL with 3mL MTE & 10mL MVI        Pt tolerating Soft Mechanical w/ Ensure & Prosource supplements             Improving Ostomy Output and improving effusion w/ chest tubes removed - decreasing nutritional losses  HypoCa- increased Ca in TPN to 14mEq- continue to monitor         Improved Ca levels can additionally assist w/ improving BP- pt w/ hypotension over the weekend  HypoPhos- improving w/ NaPhos 45mMol in TPN & will continue to monitor   Fecal fat testing suggestive of decreased absorption of fat - TPN w/ MVI to compensate for low Vit E & A         TPN/ PO Supplements added to make up for losses         Vit D levels low- Ergocalciferol supplements started    Vit K INR/ PT near normal suggestive that it is being absorbed  Decreased Urine Output- low probability of DI as Na normal,  DDAVP x2 =decreased urine output        NaCl tabs & ostomy fluid repletion stopped after appropriate response  Leukocytosis- improving - observed off abx        hypotension resolved- responded to fluid bolus        blood cx ntd   Strict Intake and Output -            Improving ostomy output           Continue to monitor while on octreotide, lomotil, tincture of opium, & imodium  BLE Edema- improving - decreased Na & volume in TPN given, diuresis   Hyperglycemia-improving      Fingersticks & ISS coverage - as per SICU  To continue monitoring of nutrition - Weights three times a week; Daily CMP, Mg, Ionized Ca, Phosphorus,        and Weekly Triglycerides and Pre-albumin  Continue as per SICU/Surgery, will follow with you, D/w primary team    Andreina Hubbard PA-C  TPN team, pager [phone]  D/w Ana M Chacko & MU Siegel A/P: 75 year old male w/ PMH of COPD and EtOH dependence with PSH/o appy, prostate surgery, & spine surgery  septic shock and high grade SBO s/p exploratory laparotomy, lysis of adhesions, decompression of bowel via enterotomy w/ primary repair, and Abthera VAC placement on 12/6; s/p take down of Abthera, washout and re-application of the Abthera vac on 12/8. Now s/p SBR and end ileostomy on 12/10.   TPN consulted to assist w/ management of pt's nutrition in pt w/ prolonged hospital course now tolerating diet but has high Ileostomy output.  Pt s/p Lt Chest Pigtail for effusion in IR with persistent high output on 1/10/2020 pt had VATs & placement of Left PleurX catheter. Pt op pt developed hemothorax and Respiratory Distress.  Pt is s/p Lt chest Evacuation of 2L blood w/ placement of CT x2 on 1/15/2020 for Lt Pleural Effusion that's resolving and doing well after CT removed and Improving Rt PNA vs Pleural Effusion.     Pt remains in SICU w/improving High Ostomy Output, and improving Hypotension w/o evidence of Sepsis.   Plan to Reverse Ileostomy in February, possibly next week  Pt w/ improving severe Protein-Calorie Malnutrition-could be a combination of  Short Gut Syndrome and Malabsorption        TPN doing well at 1/2 Goal- pt eating sufficiently to make up approximately 1/2 of nutritional goals            Amino Acids 60g, Dextrose 140g, and Lipids 30g in 1200mL with 3mL MTE & 10mL MVI        Pt tolerating Soft Mechanical w/ Ensure & Prosource supplements             Improving Ostomy Output and improving effusion w/ chest tubes removed - decreasing nutritional losses  ACTH Stim test initiated  HypoCa- increased Ca in TPN to 14mEq- continue to monitor         Improved Ca levels can additionally assist w/ improving BP- pt w/ hypotension over the weekend  HypoPhos- improving w/ NaPhos 45mMol in TPN & will continue to monitor   Fecal fat testing suggestive of decreased absorption of fat - TPN w/ MVI to compensate for low Vit E & A         TPN/ PO Supplements added to make up for losses         Vit D levels low- Ergocalciferol supplements started    Vit K INR/ PT near normal suggestive that it is being absorbed  Decreased Urine Output- low probability of DI as Na normal,  DDAVP x2 =decreased urine output        NaCl tabs & ostomy fluid repletion stopped after appropriate response  Leukocytosis- improving - observed off abx        hypotension resolved- responded to fluid bolus        blood cx ntd   Strict Intake and Output -            Improving ostomy output           Continue to monitor while on octreotide, lomotil, tincture of opium, & imodium  BLE Edema- improving - decreased Na & volume in TPN given, diuresis   Hyperglycemia-improving      Fingersticks & ISS coverage - as per SICU  To continue monitoring of nutrition - Weights three times a week; Daily CMP, Mg, Ionized Ca, Phosphorus,        and Weekly Triglycerides and Pre-albumin  Continue as per SICU/Surgery, will follow with you, D/w primary team    Andreina Hubbard PA-C  TPN team, pager [phone]  D/w Ana M Chacko & MU Siegel

## 2020-01-27 NOTE — PROGRESS NOTE ADULT - ASSESSMENT
74 y/o M presenting with septic shock and high grade SBO s/p exploratory laparotomy, lysis of adhesions, decompression of bowel via enterotomy w/ primary repair, and Abthera VAC placement on 12/6; s/p take down of Abthera, washout and re-application of the Abthera vac on 12/8. 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 s/p L chest tube placement by IR 12/30 for pleural effusion now s/p VATS, with chest tube insertion for drainage of pleural effusion. RRT called 1/13 AM for hypoxia, patient transferred back to SICU.  Patient continued SOB, chest drain possible obstruction expanding. Patent taken back to OR emergently 1/15 for clot evac and VATS.     PLAN:  - f/u blood cultures  - Diet as tolerated  - OOB to chair  - Continue Lomotil, loperamide, tincture of opium, octreotide  - Appreciate Nephrology recs  - Appreciate continued SICU care    [facility]  [id number]

## 2020-01-27 NOTE — PROGRESS NOTE ADULT - SUBJECTIVE AND OBJECTIVE BOX
[facility] NUTRITION SUPPORT / TPN -- FOLLOW UP NOTE  --------------------------------------------------------------------------------    24 hour events/subjective:  Pt feeling good today- just starting breakfast-      'slept in' - no CXR this am  pt had 2nd CT removed over the weekend  No cough/ cp/ palp/dyspnea/ sob  No f/c/s  no N/V  Improving ostomy output w/ 1700mL over 24 hours             continue lomotil, imodium, octreotide, tincture of opium at max doses      Diet:  Diet, Mechanical Soft:   No Carb Prosource (1pkg = 15gms Protein)     Qty per Day:  1  Supplement Feeding Modality:  Oral  Ensure Enlive Cans or Servings Per Day:  2       Frequency:  Daily (01-24-20 @ 11:32)      Appetite: [  ]Poor [  ]Adequate [ x ]Good  Caloric intake:  [ x  ]  Adequate   [   ] Inadequate    ROS: General/ GI see HPI  all other systems negative      ALLERGIES & MEDICATIONS  --------------------------------------------------------------------------------  ALLERGIES  IV Contrast (Hives)    STANDING INPATIENT MEDICATIONS    albuterol/ipratropium for Nebulization. 3 milliLiter(s) Nebulizer every 6 hours  buDESOnide    Inhalation Suspension 0.5 milliGRAM(s) Inhalation every 12 hours  chlorhexidine 2% Cloths 1 Application(s) Topical <User Schedule>  diphenoxylate/atropine 2 Tablet(s) Oral <User Schedule>  enoxaparin Injectable 40 milliGRAM(s) SubCutaneous daily  ergocalciferol 60071 Unit(s) Oral every week  fat emulsion (Fish Oil and Plant Based) 20% Infusion 12.5 mL/Hr IV Continuous <Continuous>  loperamide 16 milliGRAM(s) Oral <User Schedule>  octreotide  Injectable 100 MICROGram(s) SubCutaneous three times a day  opium Tincture 6 milliGRAM(s) Oral <User Schedule>  pantoprazole    Tablet 40 milliGRAM(s) Oral before breakfast  Parenteral Nutrition - Adult 1 Each TPN Continuous <Continuous>  psyllium Powder 1 Packet(s) Oral three times a day  tamsulosin 0.4 milliGRAM(s) Oral daily      PRN INPATIENT MEDICATION  acetaminophen   Tablet .. 975 milliGRAM(s) Oral every 6 hours PRN  ondansetron Injectable 4 milliGRAM(s) IV Push every 6 hours PRN        VITALS/PHYSICAL EXAM  --------------------------------------------------------------------------------  T(C): 37.3 (01-26-20 @ 23:00), Max: 37.5 (01-26-20 @ 15:00)  HR: 77 (01-27-20 @ 08:38) (68 - 96)  BP: 93/55 (01-27-20 @ 08:00) (91/51 - 134/60)  RR: 17 (01-27-20 @ 08:38) (14 - 37)  SpO2: 95% (01-27-20 @ 08:38) (94% - 100%)  Wt(kg): --        01-26-20 @ 07:01  -  01-27-20 @ 07:00  --------------------------------------------------------  IN: 2412.5 mL / OUT: 2365 mL / NET: 47.5 mL    PHYSICAL EXAM:  --------------------------------------------------------------------------------  	Gen: guarded but stable, A&Ox3, NC O2  	HEENT: NC/AT, PERRL, supple neck, trachea midline, mucosa moist              Chest: CTA; Lt chest w/ dressing c/d/i  	GI: (+) BS, softly distended, non tender                    midline incision c/d/i w/o drainage                   (+)ostomy pink viable- thick liquidy stool              MSK: FROM x4, no contractures nor deformities  	Vascular: Equally Warm, (+)BLE mild edema improving,  no clubbing, cyanosis,                       BUE w/o edema, clubbing & cyanosis                       RUE PICC w/o sx infection   	Neuro: No focal deficits, intact sensation, weakened strength BLE>BUE  	Psych: Normal affect and mood        LABS/ CULTURES/ RADIOLOGY:              8.0    8.92  >-----------<  275      [01-27-20 @ 00:14]              25.5     133  |  93  |  21  ----------------------------<  155      [01-27-20 @ 00:14]  4.9   |  32  |  0.37        Ca     7.9     [01-27-20 @ 00:14]      iCa    1.10     [01-27 @ 00:32]      Mg     1.9     [01-27-20 @ 00:14]      Phos  3.0     [01-27-20 @ 00:14]    TPro  5.7  /  Alb  2.3  /  TBili  0.3  /  DBili  0.2  /  AST  21  /  ALT  29  /  AlkPhos  74  [01-27-20 @ 00:14]    CK 31      [01-26-20 @ 02:08]    Blood Gas Calcium, Ionized - Venous: 1.22 mmoL/L (01-27-20 @ 05:22)  Blood Gas Calcium, Ionized - Venous: 1.14 mmoL/L (01-26-20 @ 01:33)    CAPILLARY BLOOD GLUCOSE  POCT Blood Glucose.: 123 mg/dL (27 Jan 2020 05:17)  POCT Blood Glucose.: 134 mg/dL (26 Jan 2020 23:01)  POCT Blood Glucose.: 134 mg/dL (26 Jan 2020 18:03)  POCT Blood Glucose.: 107 mg/dL (26 Jan 2020 12:16)    Prealbumin, Serum: 19 mg/dL (01-27-20 @ 04:52)  Prealbumin, Serum: 16 mg/dL (01-24-20 @ 02:53)  Prealbumin, Serum: 13 mg/dL (01-21-20 @ 02:00)  Prealbumin, Serum: 15 mg/dL (01-15-20 @ 09:01)  Prealbumin, Serum: 13 mg/dL (01-14-20 @ 07:29)    Triglycerides, Serum: 83 mg/dL (01.27.20 @ 00:14)  Triglycerides, Serum: 78 mg/dL (01.24.20 @ 00:18)  Triglycerides, Serum: 76 mg/dL (01.21.20 @ 00:26)  Triglycerides, Serum: 51 mg/dL (01.15.20 @ 01:44)  Triglycerides, Serum: 60 mg/dL (01.14.20 @ 03:31)      Free Thyroxine, Serum: 1.2 ng/dL (01.26.20 @ 03:57)    Thyroid Stimulating Hormone, Serum: 1.82 uIU/mL (01.26.20 @ 03:57)    Cortisol AM, Serum: 5.9 ug/dL (01.26.20 @ 03:57)    Culture - Blood (01.26.20 @ 03:52)    Specimen Source: .Blood Blood    Culture Results:   No growth to date.    < from: Xray Chest 1 View- PORTABLE-Routine (01.26.20 @ 06:56) >  FINDINGS:  Right PICC tip is in the SVC.  The lungs are clear. Bilateral pleural effusions..  The heart is normal in size. Aortic knob calcifications.  The visualized osseous structures are unremarkable.     IMPRESSION:  Small bilateral pleural effusions. [facility] NUTRITION SUPPORT / TPN -- FOLLOW UP NOTE  --------------------------------------------------------------------------------    24 hour events/subjective:  Pt feeling good today- just starting breakfast-      'slept in' - no CXR this am  pt had 2nd CT removed over the weekend  Low BP = improving, concern for sepsis     responded to IVF bolus       observed off abx     cx sent - ntd     no leukocytosis     troponins noted     acth stim test  Diuresis- DDAVP x2 given- good response        decreased urine output        pt feels less swollen        NaCl tabs stopped  c/o gas pain  No cough/ cp/ palp/dyspnea/ sob  No f/c/s  no N/V  Improving ostomy output w/ 1700mL over 24 hours             continue lomotil, imodium, octreotide, tincture of opium at max doses      Diet:  Diet, Mechanical Soft:   No Carb Prosource (1pkg = 15gms Protein)     Qty per Day:  1  Supplement Feeding Modality:  Oral  Ensure Enlive Cans or Servings Per Day:  2       Frequency:  Daily (01-24-20 @ 11:32)      Appetite: [  ]Poor [  ]Adequate [ x ]Good  Caloric intake:  [ x  ]  Adequate   [   ] Inadequate    ROS: General/ GI see HPI  all other systems negative      ALLERGIES & MEDICATIONS  --------------------------------------------------------------------------------  ALLERGIES  IV Contrast (Hives)    STANDING INPATIENT MEDICATIONS    albuterol/ipratropium for Nebulization. 3 milliLiter(s) Nebulizer every 6 hours  buDESOnide    Inhalation Suspension 0.5 milliGRAM(s) Inhalation every 12 hours  chlorhexidine 2% Cloths 1 Application(s) Topical <User Schedule>  diphenoxylate/atropine 2 Tablet(s) Oral <User Schedule>  enoxaparin Injectable 40 milliGRAM(s) SubCutaneous daily  ergocalciferol 82629 Unit(s) Oral every week  fat emulsion (Fish Oil and Plant Based) 20% Infusion 12.5 mL/Hr IV Continuous <Continuous>  loperamide 16 milliGRAM(s) Oral <User Schedule>  octreotide  Injectable 100 MICROGram(s) SubCutaneous three times a day  opium Tincture 6 milliGRAM(s) Oral <User Schedule>  pantoprazole    Tablet 40 milliGRAM(s) Oral before breakfast  Parenteral Nutrition - Adult 1 Each TPN Continuous <Continuous>  psyllium Powder 1 Packet(s) Oral three times a day  tamsulosin 0.4 milliGRAM(s) Oral daily      PRN INPATIENT MEDICATION  acetaminophen   Tablet .. 975 milliGRAM(s) Oral every 6 hours PRN  ondansetron Injectable 4 milliGRAM(s) IV Push every 6 hours PRN        VITALS/PHYSICAL EXAM  --------------------------------------------------------------------------------  T(C): 37.3 (01-26-20 @ 23:00), Max: 37.5 (01-26-20 @ 15:00)  HR: 77 (01-27-20 @ 08:38) (68 - 96)  BP: 93/55 (01-27-20 @ 08:00) (91/51 - 134/60)  RR: 17 (01-27-20 @ 08:38) (14 - 37)  SpO2: 95% (01-27-20 @ 08:38) (94% - 100%)  Wt(kg): --        01-26-20 @ 07:01  -  01-27-20 @ 07:00  --------------------------------------------------------  IN: 2412.5 mL / OUT: 2365 mL / NET: 47.5 mL    PHYSICAL EXAM:  --------------------------------------------------------------------------------  	Gen: guarded but stable, A&Ox3, NC O2  	HEENT: NC/AT, PERRL, supple neck, trachea midline, mucosa moist              Chest: CTA; Lt chest w/ dressing c/d/i  	GI: (+) BS, softly distended, non tender                    midline incision c/d/i w/o drainage                   (+)ostomy pink viable- thick loose BM              MSK: FROM x4, no contractures nor deformities  	Vascular: Equally Warm, (+)BLE mild edema improving,  no clubbing, cyanosis,                       BUE w/o edema, clubbing & cyanosis                       RUE PICC w/o sx infection   	Neuro: No focal deficits, intact sensation, weakened strength BLE>BUE  	Psych: Normal affect and mood        LABS/ CULTURES/ RADIOLOGY:              8.0    8.92  >-----------<  275      [01-27-20 @ 00:14]              25.5     133  |  93  |  21  ----------------------------<  155      [01-27-20 @ 00:14]  4.9   |  32  |  0.37        Ca     7.9     [01-27-20 @ 00:14]      iCa    1.10     [01-27 @ 00:32]      Mg     1.9     [01-27-20 @ 00:14]      Phos  3.0     [01-27-20 @ 00:14]    TPro  5.7  /  Alb  2.3  /  TBili  0.3  /  DBili  0.2  /  AST  21  /  ALT  29  /  AlkPhos  74  [01-27-20 @ 00:14]    CK 31      [01-26-20 @ 02:08]    Blood Gas Calcium, Ionized - Venous: 1.22 mmoL/L (01-27-20 @ 05:22)  Blood Gas Calcium, Ionized - Venous: 1.14 mmoL/L (01-26-20 @ 01:33)    CAPILLARY BLOOD GLUCOSE  POCT Blood Glucose.: 123 mg/dL (27 Jan 2020 05:17)  POCT Blood Glucose.: 134 mg/dL (26 Jan 2020 23:01)  POCT Blood Glucose.: 134 mg/dL (26 Jan 2020 18:03)  POCT Blood Glucose.: 107 mg/dL (26 Jan 2020 12:16)    Prealbumin, Serum: 19 mg/dL (01-27-20 @ 04:52)  Prealbumin, Serum: 16 mg/dL (01-24-20 @ 02:53)  Prealbumin, Serum: 13 mg/dL (01-21-20 @ 02:00)  Prealbumin, Serum: 15 mg/dL (01-15-20 @ 09:01)  Prealbumin, Serum: 13 mg/dL (01-14-20 @ 07:29)    Triglycerides, Serum: 83 mg/dL (01.27.20 @ 00:14)  Triglycerides, Serum: 78 mg/dL (01.24.20 @ 00:18)  Triglycerides, Serum: 76 mg/dL (01.21.20 @ 00:26)  Triglycerides, Serum: 51 mg/dL (01.15.20 @ 01:44)  Triglycerides, Serum: 60 mg/dL (01.14.20 @ 03:31)      Free Thyroxine, Serum: 1.2 ng/dL (01.26.20 @ 03:57)    Thyroid Stimulating Hormone, Serum: 1.82 uIU/mL (01.26.20 @ 03:57)    Cortisol AM, Serum: 5.9 ug/dL (01.26.20 @ 03:57)    Culture - Blood (01.26.20 @ 03:52)    Specimen Source: .Blood Blood    Culture Results:   No growth to date.    < from: Xray Chest 1 View- PORTABLE-Routine (01.26.20 @ 06:56) >  FINDINGS:  Right PICC tip is in the SVC.  The lungs are clear. Bilateral pleural effusions..  The heart is normal in size. Aortic knob calcifications.  The visualized osseous structures are unremarkable.     IMPRESSION:  Small bilateral pleural effusions.

## 2020-01-27 NOTE — PROGRESS NOTE ADULT - ATTENDING COMMENTS
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

## 2020-01-27 NOTE — PROGRESS NOTE ADULT - ATTENDING COMMENTS
Awake and alert  Saturating well, off chest tube, CXR no ptx or significant effusion  PO/TPN on high dose multiple anti prokinetic meds for high ileostomy output  Polyuria decreased to DDAVP, serum Na 133, will hold off DDAVP  Continue to monitor I/o, IVF bolus to keep his fluid balance even as needed  PT/mobilization

## 2020-01-28 LAB
ALBUMIN SERPL ELPH-MCNC: 2.6 G/DL — LOW (ref 3.3–5)
ALP SERPL-CCNC: 80 U/L — SIGNIFICANT CHANGE UP (ref 40–120)
ALT FLD-CCNC: 28 U/L — SIGNIFICANT CHANGE UP (ref 10–45)
ANION GAP SERPL CALC-SCNC: 11 MMOL/L — SIGNIFICANT CHANGE UP (ref 5–17)
AST SERPL-CCNC: 21 U/L — SIGNIFICANT CHANGE UP (ref 10–40)
BILIRUB DIRECT SERPL-MCNC: 0.2 MG/DL — SIGNIFICANT CHANGE UP (ref 0–0.2)
BILIRUB INDIRECT FLD-MCNC: 0.3 MG/DL — SIGNIFICANT CHANGE UP (ref 0.2–1)
BILIRUB SERPL-MCNC: 0.5 MG/DL — SIGNIFICANT CHANGE UP (ref 0.2–1.2)
BUN SERPL-MCNC: 19 MG/DL — SIGNIFICANT CHANGE UP (ref 7–23)
CALCIUM SERPL-MCNC: 8.5 MG/DL — SIGNIFICANT CHANGE UP (ref 8.4–10.5)
CHLORIDE SERPL-SCNC: 95 MMOL/L — LOW (ref 96–108)
CO2 SERPL-SCNC: 31 MMOL/L — SIGNIFICANT CHANGE UP (ref 22–31)
CREAT SERPL-MCNC: 0.41 MG/DL — LOW (ref 0.5–1.3)
GAS PNL BLDV: SIGNIFICANT CHANGE UP
GLUCOSE SERPL-MCNC: 144 MG/DL — HIGH (ref 70–99)
HCT VFR BLD CALC: 25.9 % — LOW (ref 39–50)
HGB BLD-MCNC: 8.1 G/DL — LOW (ref 13–17)
MAGNESIUM SERPL-MCNC: 1.8 MG/DL — SIGNIFICANT CHANGE UP (ref 1.6–2.6)
MCHC RBC-ENTMCNC: 30.3 PG — SIGNIFICANT CHANGE UP (ref 27–34)
MCHC RBC-ENTMCNC: 31.3 GM/DL — LOW (ref 32–36)
MCV RBC AUTO: 97 FL — SIGNIFICANT CHANGE UP (ref 80–100)
NRBC # BLD: 0 /100 WBCS — SIGNIFICANT CHANGE UP (ref 0–0)
PHOSPHATE SERPL-MCNC: 3.3 MG/DL — SIGNIFICANT CHANGE UP (ref 2.5–4.5)
PLATELET # BLD AUTO: 282 K/UL — SIGNIFICANT CHANGE UP (ref 150–400)
POTASSIUM SERPL-MCNC: 4.6 MMOL/L — SIGNIFICANT CHANGE UP (ref 3.5–5.3)
POTASSIUM SERPL-SCNC: 4.6 MMOL/L — SIGNIFICANT CHANGE UP (ref 3.5–5.3)
PROT SERPL-MCNC: 6.3 G/DL — SIGNIFICANT CHANGE UP (ref 6–8.3)
RBC # BLD: 2.67 M/UL — LOW (ref 4.2–5.8)
RBC # FLD: 14.6 % — HIGH (ref 10.3–14.5)
SODIUM SERPL-SCNC: 137 MMOL/L — SIGNIFICANT CHANGE UP (ref 135–145)
WBC # BLD: 8.59 K/UL — SIGNIFICANT CHANGE UP (ref 3.8–10.5)
WBC # FLD AUTO: 8.59 K/UL — SIGNIFICANT CHANGE UP (ref 3.8–10.5)

## 2020-01-28 PROCEDURE — 99233 SBSQ HOSP IP/OBS HIGH 50: CPT

## 2020-01-28 PROCEDURE — 71045 X-RAY EXAM CHEST 1 VIEW: CPT | Mod: 26

## 2020-01-28 RX ORDER — ELECTROLYTE SOLUTION,INJ
1 VIAL (ML) INTRAVENOUS
Refills: 0 | Status: DISCONTINUED | OUTPATIENT
Start: 2020-01-28 | End: 2020-01-28

## 2020-01-28 RX ORDER — DESMOPRESSIN ACETATE 0.1 MG/1
2 TABLET ORAL ONCE
Refills: 0 | Status: COMPLETED | OUTPATIENT
Start: 2020-01-28 | End: 2020-01-28

## 2020-01-28 RX ORDER — MAGNESIUM SULFATE 500 MG/ML
2 VIAL (ML) INJECTION ONCE
Refills: 0 | Status: COMPLETED | OUTPATIENT
Start: 2020-01-28 | End: 2020-01-28

## 2020-01-28 RX ORDER — SODIUM CHLORIDE 9 MG/ML
1000 INJECTION, SOLUTION INTRAVENOUS ONCE
Refills: 0 | Status: COMPLETED | OUTPATIENT
Start: 2020-01-28 | End: 2020-01-28

## 2020-01-28 RX ORDER — I.V. FAT EMULSION 20 G/100ML
12.5 EMULSION INTRAVENOUS
Qty: 30 | Refills: 0 | Status: DISCONTINUED | OUTPATIENT
Start: 2020-01-28 | End: 2020-01-29

## 2020-01-28 RX ADMIN — SODIUM CHLORIDE 4000 MILLILITER(S): 9 INJECTION, SOLUTION INTRAVENOUS at 10:07

## 2020-01-28 RX ADMIN — Medication 1 PACKET(S): at 22:45

## 2020-01-28 RX ADMIN — Medication 2 TABLET(S): at 17:49

## 2020-01-28 RX ADMIN — Medication 50 GRAM(S): at 02:12

## 2020-01-28 RX ADMIN — Medication 2 TABLET(S): at 13:56

## 2020-01-28 RX ADMIN — MORPHINE 6 MILLIGRAM(S): 10 SOLUTION ORAL at 13:59

## 2020-01-28 RX ADMIN — TAMSULOSIN HYDROCHLORIDE 0.4 MILLIGRAM(S): 0.4 CAPSULE ORAL at 11:12

## 2020-01-28 RX ADMIN — Medication 3 MILLILITER(S): at 11:19

## 2020-01-28 RX ADMIN — OCTREOTIDE ACETATE 100 MICROGRAM(S): 200 INJECTION, SOLUTION INTRAVENOUS; SUBCUTANEOUS at 05:00

## 2020-01-28 RX ADMIN — Medication 16 MILLIGRAM(S): at 22:46

## 2020-01-28 RX ADMIN — Medication 1 PACKET(S): at 08:32

## 2020-01-28 RX ADMIN — Medication 1 EACH: at 08:33

## 2020-01-28 RX ADMIN — Medication 1 APPLICATION(S): at 22:47

## 2020-01-28 RX ADMIN — Medication 1 EACH: at 17:44

## 2020-01-28 RX ADMIN — Medication 16 MILLIGRAM(S): at 13:56

## 2020-01-28 RX ADMIN — Medication 2 TABLET(S): at 08:32

## 2020-01-28 RX ADMIN — DESMOPRESSIN ACETATE 2 MICROGRAM(S): 0.1 TABLET ORAL at 11:12

## 2020-01-28 RX ADMIN — Medication 2 TABLET(S): at 22:46

## 2020-01-28 RX ADMIN — I.V. FAT EMULSION 12.5 ML/HR: 20 EMULSION INTRAVENOUS at 17:44

## 2020-01-28 RX ADMIN — Medication 0.5 MILLIGRAM(S): at 17:15

## 2020-01-28 RX ADMIN — OCTREOTIDE ACETATE 100 MICROGRAM(S): 200 INJECTION, SOLUTION INTRAVENOUS; SUBCUTANEOUS at 14:39

## 2020-01-28 RX ADMIN — PANTOPRAZOLE SODIUM 40 MILLIGRAM(S): 20 TABLET, DELAYED RELEASE ORAL at 08:32

## 2020-01-28 RX ADMIN — MORPHINE 6 MILLIGRAM(S): 10 SOLUTION ORAL at 17:49

## 2020-01-28 RX ADMIN — Medication 3 MILLILITER(S): at 17:16

## 2020-01-28 RX ADMIN — Medication 3 MILLILITER(S): at 05:59

## 2020-01-28 RX ADMIN — CHLORHEXIDINE GLUCONATE 1 APPLICATION(S): 213 SOLUTION TOPICAL at 05:00

## 2020-01-28 RX ADMIN — ENOXAPARIN SODIUM 40 MILLIGRAM(S): 100 INJECTION SUBCUTANEOUS at 11:13

## 2020-01-28 RX ADMIN — Medication 1 PACKET(S): at 13:57

## 2020-01-28 RX ADMIN — Medication 16 MILLIGRAM(S): at 08:35

## 2020-01-28 RX ADMIN — MORPHINE 6 MILLIGRAM(S): 10 SOLUTION ORAL at 22:46

## 2020-01-28 RX ADMIN — OCTREOTIDE ACETATE 100 MICROGRAM(S): 200 INJECTION, SOLUTION INTRAVENOUS; SUBCUTANEOUS at 22:46

## 2020-01-28 RX ADMIN — MORPHINE 6 MILLIGRAM(S): 10 SOLUTION ORAL at 08:32

## 2020-01-28 RX ADMIN — Medication 0.5 MILLIGRAM(S): at 05:59

## 2020-01-28 RX ADMIN — Medication 16 MILLIGRAM(S): at 17:48

## 2020-01-28 NOTE — PROGRESS NOTE ADULT - SUBJECTIVE AND OBJECTIVE BOX
[facility] NUTRITION SUPPORT / TPN -- FOLLOW UP NOTE  --------------------------------------------------------------------------------    24 hour events/subjective:  Pt feeling good today-enjoyed breakfast this am-   Low BP = improving-   Concerned about sepsis-  observed off abx     cx sent - ntd     no leukocytosis     troponins noted     acth stim test pending   increased Urine Output=  DDAVP ordered- good response        pt feels less swollen  c/o gas pain  No cough/ cp/ palp/dyspnea/ sob  No f/c/s  no N/V  Increased ostomy output w/ 2000mL over 24 hours             continue lomotil, imodium, octreotide, tincture of opium at max doses      Diet:  Diet, Mechanical Soft:   No Carb Prosource (1pkg = 15gms Protein)     Qty per Day:  1  Supplement Feeding Modality:  Oral  Ensure Enlive Cans or Servings Per Day:  2       Frequency:  Daily (01-24-20 @ 11:32)      Appetite: [  ]Poor [  ]Adequate [ x ]Good  Caloric intake:  [ x  ]  Adequate   [   ] Inadequate    ROS: General/ GI see HPI  all other systems negative      ALLERGIES & MEDICATIONS  --------------------------------------------------------------------------------  ALLERGIES  IV Contrast (Hives)        STANDING INPATIENT MEDICATIONS    albuterol/ipratropium for Nebulization. 3 milliLiter(s) Nebulizer every 6 hours  buDESOnide    Inhalation Suspension 0.5 milliGRAM(s) Inhalation every 12 hours  chlorhexidine 2% Cloths 1 Application(s) Topical <User Schedule>  desmopressin Injectable 2 MICROGram(s) SubCutaneous once  diphenoxylate/atropine 2 Tablet(s) Oral <User Schedule>  enoxaparin Injectable 40 milliGRAM(s) SubCutaneous daily  ergocalciferol 15123 Unit(s) Oral every week  fat emulsion (Fish Oil and Plant Based) 20% Infusion 12.5 mL/Hr IV Continuous <Continuous>  loperamide 16 milliGRAM(s) Oral <User Schedule>  octreotide  Injectable 100 MICROGram(s) SubCutaneous three times a day  opium Tincture 6 milliGRAM(s) Oral <User Schedule>  pantoprazole    Tablet 40 milliGRAM(s) Oral before breakfast  Parenteral Nutrition - Adult 1 Each TPN Continuous <Continuous>  Parenteral Nutrition - Adult 1 Each TPN Continuous <Continuous>  psyllium Powder 1 Packet(s) Oral three times a day  tamsulosin 0.4 milliGRAM(s) Oral daily      PRN INPATIENT MEDICATION  acetaminophen   Tablet .. 975 milliGRAM(s) Oral every 6 hours PRN  diphenhydramine 2%/zinc acetate 0.1% Cream 1 Application(s) Topical two times a day PRN  ondansetron Injectable 4 milliGRAM(s) IV Push every 6 hours PRN        VITALS/PHYSICAL EXAM  --------------------------------------------------------------------------------  T(C): 37.4 (01-28-20 @ 07:00), Max: 37.4 (01-27-20 @ 19:00)  HR: 76 (01-28-20 @ 10:00) (67 - 92)  BP: 147/68 (01-28-20 @ 10:00) (108/57 - 147/68)  RR: 24 (01-28-20 @ 10:00) (13 - 31)  SpO2: 99% (01-28-20 @ 10:00) (90% - 100%)  Wt(kg): --        01-27-20 @ 07:01  -  01-28-20 @ 07:00  --------------------------------------------------------  IN: 2812.5 mL / OUT: 4200 mL / NET: -1387.5 mL    01-28-20 @ 07:01  -  01-28-20 @ 11:10  --------------------------------------------------------  IN: 1650 mL / OUT: 550 mL / NET: 1100 mL    PHYSICAL EXAM:  --------------------------------------------------------------------------------  	Gen: guarded but stable, A&Ox3, NC O2  	HEENT: NC/AT, PERRL, supple neck, trachea midline, mucosa moist              Chest: CTA; Lt chest w/ dressing c/d/i  	GI: (+) BS, softly distended, non tender                    midline incision c/d/i w/o drainage                   (+)ostomy pink viable- thick loose BM              MSK: FROM x4, no contractures nor deformities  	Vascular: Equally Warm, (+)BLE mild edema improving,  no clubbing, cyanosis,                       BUE w/o edema, clubbing & cyanosis                       RUE PICC w/o sx infection   	Neuro: No focal deficits, intact sensation, weakened strength BLE>BUE  	Psych: Normal affect and mood        LABS/ CULTURES/ RADIOLOGY:              8.1    8.59  >-----------<  282      [01-28-20 @ 00:26]              25.9     137  |  95  |  19  ----------------------------<  144      [01-28-20 @ 00:26]  4.6   |  31  |  0.41        Ca     8.5     [01-28-20 @ 00:26]      iCa    1.10     [01-27 @ 00:32]      Mg     1.8     [01-28-20 @ 00:26]      Phos  3.3     [01-28-20 @ 00:26]    TPro  6.3  /  Alb  2.6  /  TBili  0.5  /  DBili  0.2  /  AST  21  /  ALT  28  /  AlkPhos  80  [01-28-20 @ 00:26]    Blood Gas Calcium, Ionized - Venous: 1.19 mmoL/L (01-28-20 @ 00:26)  Blood Gas Calcium, Ionized - Venous: 1.22 mmoL/L (01-27-20 @ 05:22)    Prealbumin, Serum: 19 mg/dL (01-27-20 @ 04:52)  Prealbumin, Serum: 16 mg/dL (01-24-20 @ 02:53)  Prealbumin, Serum: 13 mg/dL (01-21-20 @ 02:00)  Prealbumin, Serum: 15 mg/dL (01-15-20 @ 09:01)  Prealbumin, Serum: 13 mg/dL (01-14-20 @ 07:29)    Triglycerides, Serum: 83 mg/dL (01.27.20 @ 00:14)  Triglycerides, Serum: 78 mg/dL (01.24.20 @ 00:18)  Triglycerides, Serum: 76 mg/dL (01.21.20 @ 00:26)  Triglycerides, Serum: 51 mg/dL (01.15.20 @ 01:44)  Triglycerides, Serum: 60 mg/dL (01.14.20 @ 03:31)

## 2020-01-28 NOTE — PROGRESS NOTE ADULT - ATTENDING COMMENTS
Awake and alert, comfortable  Saturating well, off chest tube, CXR no ptx or significant effusion  PO/TPN on high dose multiple anti prokinetic meds for high ileostomy output  Worsening of polyuria again with 2.5 L negative fluid balance, , hyponatremia improved, will give on small dose of DDAVP again, one L fluid bolus to prevent dehydration  Increase volume in TPN  ACTH stimulation test pending  PT/mobilization

## 2020-01-28 NOTE — PROGRESS NOTE ADULT - ATTENDING COMMENTS
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

## 2020-01-28 NOTE — PROGRESS NOTE ADULT - SUBJECTIVE AND OBJECTIVE BOX
HISTORY  75y Male past medical history of COPD and EtOH dependence who presented on 12/6/2019 with abdominal pain, nausea, hematemesis, and poor PO intake for ~2-3 days. In the ED, he was found to be hypotensive & tachycardic. Labs revealed an CHI and lactic acidosis. Imaging revealed a high grade SBO in the RLQ on CT scan. Hospital course is as follows:  12/06 - s/p exploratory laparotomy, lysis of adhesions, decompression of bowel via enterotomy w/ primary repair, and Abthera VAC placement as the distal 50% of the bowel appeared dusky but was still viable, admitted to SICU post-operatively as he was on vasopressor support and was left intubated  12/08 - s/p re-exploration, proximal 165 cm of small bowel appeared pink & viable but beyond that had patchy areas of ischemia so decision was made to give the bowel more time to demarcate before resecting in order to preserve as much small bowel as possible so Abthera VAC was replaced  12/09 - s/p re-exploration, small bowel resection of 150 cm (150 cm remaining), ileocecetomy, end ileostomy, mucous fistula, and abdominal closure  12/11 - extubated to BiPAP, noted to be in SVT that was rate controlled w/ metoprolol  12/14 - started on TPN  12/15 - noted to have spontaneous left pneumothorax s/p left pigtail catheter placement, noted to be in SVT again so amiodarone started, high ileostomy output noted so concern for short bowel syndrome  12/16 - amiodarone discontinued, started on beta blocker with metoprolol  12/18 - started Lomotil and ileostomy repletions with IV fluids  12/19 - started Imodium, left pigtail catheter was placed to water seal but pneumothorax noted on follow-up CXR so placed back to suction  12/20 - started tincture of opium, concern for aspiration so changed diet from regular to dysphagia 1 pureed with nectar-thickened liquids  12/22 - left pigtail catheter placed to water seal with no pneumothorax noted on follow-up CXR, CT chest with moderate left pleural effusion not being drained by pigtail catheter  12/23 - 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  12/24 - left pigtail catheter discontinued  12/28 - started Ancef for erythematous midline wound  12/29 - beta blocker discontinued for intermittent episodes of hypotension  12/30 - left pigtail catheter placement by IR with drainage of serous transudative fluid  01/02 - FEES demonstrating penetration with thin liquids so patient kept on dysphagia 1 pureed with nectar-thickened liquids  01/07 - Transferred to floors  01/10 - s/p left VATS w/ PleurX catheter placement  01/11 - evaluated by speech & swallow, advanced diet to mechanical soft with thin liquids  01/12 - PleurX catheter placed to water seal with on pneumothorax on follow-up CXR  01/13 - RRT for hypoxemia, placed on BiPAP, PleurX catheter placed back to suction, started vancomycin & Zosyn for possible HCAP  01/14 - noted to have minimal output from PleurX catheter, lung ultrasound with large left pleural effusion concerning for hemothorax, multiple episodes of bradycardia secondary to hypoxemia, remains on BiPAP  01/15 - worsening respiratory distress requiring intubation, hypotensive requiring vasopressor support, CT chest with large left hemothorax w/ trace pneumothorax & extensive subcutaneous emphysema so taken to OR emergently for left VATS, evacuation fo 2 L of clot, and placement of two left chest tubes  01/16 - extubated, weaned off vasopressor support  01/17 - two left chest tubes placed to water seal with no pneumothorax noted on follow-up CXR          24 HOUR EVENTS:  - No acute events overnight. BP remains stable. Ileostomy output stable    SUBJECTIVE/ROS:  [ ] A ten-point review of systems was otherwise negative except as noted.  [ ] Due to altered mental status/intubation, subjective information were not able to be obtained from the patient. History was obtained, to the extent possible, from review of the chart and collateral sources of information.      NEURO  Exam: awake, alert, oriented  Meds: acetaminophen   Tablet .. 975 milliGRAM(s) Oral every 6 hours PRN Mild Pain (1 - 3)  ondansetron Injectable 4 milliGRAM(s) IV Push every 6 hours PRN Nausea and/or Vomiting  opium Tincture 6 milliGRAM(s) Oral <User Schedule>    [x] Adequacy of sedation and pain control has been assessed and adjusted      RESPIRATORY  RR: 13 (01-28-20 @ 02:00) (13 - 31)  SpO2: 99% (01-28-20 @ 02:00) (90% - 100%)  Exam: unlabored, clear to auscultation bilaterally  Mechanical Ventilation: none  [N/A] Extubation Readiness Assessed  Meds: albuterol/ipratropium for Nebulization. 3 milliLiter(s) Nebulizer every 6 hours  buDESOnide    Inhalation Suspension 0.5 milliGRAM(s) Inhalation every 12 hours        CARDIOVASCULAR  HR: 69 (01-28-20 @ 02:00) (68 - 92)  BP: 112/57 (01-28-20 @ 02:00) (93/55 - 139/65)  BP(mean): 81 (01-28-20 @ 02:00) (69 - 100)  VBG - ( 28 Jan 2020 00:26 )  pH: 7.39  /  pCO2: 61    /  pO2: 46    / HCO3: 36    / Base Excess: 10.0  /  SaO2: 78     Lactate: 0.9    Exam: regular rate and rhythm  Cardiac Rhythm: sinus  Perfusion     [x]Adequate   [ ]Inadequate  Mentation   [x]Normal       [ ]Reduced  Extremities  [x]Warm         [ ]Cool  Volume Status [ ]Hypervolemic [x]Euvolemic [ ]Hypovolemic  Meds: tamsulosin 0.4 milliGRAM(s) Oral daily        GI/NUTRITION  Exam: soft, nontender, nondistended, incision C/D/I  Diet: soft diet   Meds: diphenoxylate/atropine 2 Tablet(s) Oral <User Schedule>  loperamide 16 milliGRAM(s) Oral <User Schedule>  pantoprazole    Tablet 40 milliGRAM(s) Oral before breakfast  psyllium Powder 1 Packet(s) Oral three times a day      GENITOURINARY  I&O's Detail    01-26 @ 07:01 - 01-27 @ 07:00  --------------------------------------------------------  IN:    fat emulsion (Fish Oil and Plant Based) 20% Infusion: 137.5 mL    Oral Fluid: 850 mL    Solution: 325 mL    TPN (Total Parenteral Nutrition): 1100 mL  Total IN: 2412.5 mL    OUT:    Ileostomy: 1700 mL    Indwelling Catheter - Urethral: 65 mL    Voided: 600 mL  Total OUT: 2365 mL    Total NET: 47.5 mL      01-27 @ 07:01  -  01-28 @ 03:07  --------------------------------------------------------  IN:    fat emulsion (Fish Oil and Plant Based) 20% Infusion: 125 mL    Oral Fluid: 1400 mL    Solution: 50 mL    TPN (Total Parenteral Nutrition): 950 mL  Total IN: 2525 mL    OUT:    Ileostomy: 1850 mL    Voided: 1550 mL  Total OUT: 3400 mL    Total NET: -875 mL          01-28    137  |  95<L>  |  19  ----------------------------<  144<H>  4.6   |  31  |  0.41<L>    Ca    8.5      28 Jan 2020 00:26  Phos  3.3     01-28  Mg     1.8     01-28    TPro  6.3  /  Alb  2.6<L>  /  TBili  0.5  /  DBili  0.2  /  AST  21  /  ALT  28  /  AlkPhos  80  01-28    [ ] Martinez catheter, indication: N/A  Meds: ergocalciferol 29607 Unit(s) Oral every week  fat emulsion (Fish Oil and Plant Based) 20% Infusion 12.5 mL/Hr IV Continuous <Continuous>  Parenteral Nutrition - Adult 1 Each TPN Continuous <Continuous>        HEMATOLOGIC  Meds: enoxaparin Injectable 40 milliGRAM(s) SubCutaneous daily    [x] VTE Prophylaxis                        8.1    8.59  )-----------( 282      ( 28 Jan 2020 00:26 )             25.9       Transfusion     [ ] PRBC   [ ] Platelets   [ ] FFP   [ ] Cryoprecipitate      INFECTIOUS DISEASES  WBC Count: 8.59 K/uL (01-28 @ 00:26)    RECENT CULTURES:  Specimen Source: .Blood Blood  Date/Time: 01-26 @ 03:52  Culture Results:   No growth to date.  Gram Stain: --  Organism: --    Meds:       ENDOCRINE  CAPILLARY BLOOD GLUCOSE      POCT Blood Glucose.: 123 mg/dL (27 Jan 2020 05:17)    Meds: octreotide  Injectable 100 MICROGram(s) SubCutaneous three times a day        ACCESS DEVICES:  [x] Peripheral IV  [ ] Central Venous Line	[ ] R	[ ] L	[ ] IJ	[ ] Fem	[ ] SC	Placed:   [ ] Arterial Line		[ ] R	[ ] L	[ ] Fem	[ ] Rad	[ ] Ax	Placed:   [ ] PICC:					[ ] Mediport  [ ] Urinary Catheter, Date Placed:   [x] Necessity of urinary, arterial, and venous catheters discussed    OTHER MEDICATIONS:  chlorhexidine 2% Cloths 1 Application(s) Topical <User Schedule>  diphenhydramine 2%/zinc acetate 0.1% Cream 1 Application(s) Topical two times a day PRN      CODE STATUS: full code       IMAGING: < from: CT Abdomen and Pelvis w/ IV Cont (01.15.20 @ 13:25) >  IMPRESSION:     Loculated large left pleural effusion with areas of hyperattenuation likely secondary to hematoma. Interval increase in size of the loculated left pleural effusion since 12/30/2019. Left sided chest tube terminating in the pleural space. Trace left pneumothorax.     Extensive left subcutaneous emphysema. Hematoma is also noted along the left lateral chest wall.    Interval resolution of the midline anterior abdominal wall fluid collection.    Markedly distended urinary bladder with mild bilateral hydronephrosis.    A small droplet of gas in the right anterior abdomen adjacent to the ileostomy may be extraluminal. Repeat CT with oral contrast may be helpful for further evaluation.    < end of copied text >

## 2020-01-28 NOTE — PROGRESS NOTE ADULT - ASSESSMENT
A/P: 75 year old male w/ PMH of COPD and EtOH dependence with PSH/o appy, prostate surgery, & spine surgery  septic shock and high grade SBO s/p exploratory laparotomy, lysis of adhesions, decompression of bowel via enterotomy w/ primary repair, and Abthera VAC placement on 12/6; s/p take down of Abthera, washout and re-application of the Abthera vac on 12/8. Now s/p SBR and end ileostomy on 12/10.   TPN consulted to assist w/ management of pt's nutrition in pt w/ prolonged hospital course now tolerating diet but has high Ileostomy output.  Pt s/p Lt Chest Pigtail for effusion in IR with persistent high output on 1/10/2020 pt had VATs & placement of Left PleurX catheter. Pt op pt developed hemothorax and Respiratory Distress.  Pt is s/p Lt chest Evacuation of 2L blood w/ placement of CT x2 on 1/15/2020 for Lt Pleural Effusion that's resolving and doing well after CT removed and Improving Rt PNA vs Pleural Effusion.     Pt remains in SICU w/improving High Ostomy Output, and improving Hypotension w/o evidence of Sepsis.   Plan to Reverse Ileostomy in February, possibly next week  Pt w/ improving severe Protein-Calorie Malnutrition-        Short Gut Syndrome w/Malabsorption        TPN doing well at 1/2 Goal- pt eating sufficiently to make up approximately 1/2 of nutritional goals            Amino Acids 60g, Dextrose 140g, and Lipids 30g in 1700mL with 3mL MTE & 10mL MVI        Pt tolerating Soft Mechanical w/ Ensure & Prosource supplements           Ostomy Output and effusion improving - decreasing nutritional losses    -Concern for adrenal insufficiency (1/26 AM cortisol = 5.9)        ACTH Stim test pending  - HypoCa- increased Ca in TPN to 14mEq- continue to monitor        Improved Ca levels helpful for BP & muscle contraction  -HypoPhos- improving w/ NaPhos 45mMol in TPN & will continue to monitor   -Fecal fat testing suggestive of decreased absorption of fat -         TPN w/ MVI to compensate for low Vit E & A        Vit D levels low- Ergocalciferol supplements started        Vit K INR/ PT near normal suggestive that it is being absorbed  - Edema/ Fluid overload greatly improved w/ recent h/o HypoTN responding to IVF           Increased total vol TPN to 1700 from 1200mL           Added NaCl 40mEq to TPN  -Increased Urine Output- DDAVP ordered as pt had good response previously         Na wnl- low probability of DI  -Strict Intake and Output -            Improving ostomy output           Continue to monitor while on octreotide, lomotil, tincture of opium, & imodium  -Leukocytosis- resolved- observing off abx         blood cx ntd   Hyperglycemia-improving      Fingersticks & ISS coverage - as per SICU  To continue monitoring of nutrition - Weights three times a week; Daily CMP, Mg, Ionized Ca, Phosphorus,        and Weekly Triglycerides and Pre-albumin  Continue as per SICU/Surgery, will follow with you, D/w primary team    Andreina Hubbard PA-C  TPN team, pager [phone]  D/w Ana M Chacko & MU Siegel A/P: 75 year old male w/ PMH of COPD and EtOH dependence with PSH/o appy, prostate surgery, & spine surgery  septic shock and high grade SBO s/p exploratory laparotomy, lysis of adhesions, decompression of bowel via enterotomy w/ primary repair, and Abthera VAC placement on 12/6; s/p take down of Abthera, washout and re-application of the Abthera vac on 12/8. Now s/p SBR and end ileostomy on 12/10.   TPN consulted to assist w/ management of pt's nutrition in pt w/ prolonged hospital course now tolerating diet but has high Ileostomy output.  Pt s/p Lt Chest Pigtail for effusion in IR with persistent high output on 1/10/2020 pt had VATs & placement of Left PleurX catheter. Pt op pt developed hemothorax and Respiratory Distress.  Pt is s/p Lt chest Evacuation of 2L blood w/ placement of CT x2 on 1/15/2020 for Lt Pleural Effusion that's resolving and doing well after CT removed and Improving Rt PNA vs Pleural Effusion.     Pt remains in SICU w/improving High Ostomy Output, and improving Hypotension w/o evidence of Sepsis.   Plan to Reverse Ileostomy in February, possibly next week  Pt w/ improving severe Protein-Calorie Malnutrition-        Short Gut Syndrome w/Malabsorption        TPN doing well at 1/2 Goal- pt only able to eat approximately 1/2 of nutritional goals            Amino Acids 60g, Dextrose 140g, and Lipids 30g in 1600mL with 3mL MTE & 10mL MVI        Pt tolerating Soft Mechanical w/ Ensure & Prosource supplements           Ostomy Output and effusion improving - decreasing nutritional losses    -Concern for adrenal insufficiency (1/26 AM cortisol = 5.9)        ACTH Stim test pending  - HypoCa- increased Ca in TPN to 14mEq- continue to monitor        Improved Ca levels helpful for BP & muscle contraction  -HypoPhos- improving w/ NaPhos 45mMol in TPN & will continue to monitor   -Fecal fat testing suggestive of decreased absorption of fat -         TPN w/ MVI to compensate for low Vit E & A        Vit D levels low- Ergocalciferol supplements started        Vit K INR/ PT near normal suggestive that it is being absorbed  - Edema/ Fluid overload greatly improved w/ recent h/o HypoTN responding to IVF           Increased total vol TPN to 1600 from 1200mL           Added NaCl 40mEq to TPN  -Increased Urine Output- DDAVP ordered as pt had good response previously         Na wnl- low probability of DI  -Strict Intake and Output -            Improving ostomy output           Continue to monitor while on octreotide, lomotil, tincture of opium, & imodium  -Leukocytosis- resolved- observing off abx         blood cx ntd   Hyperglycemia-improving      Fingersticks & ISS coverage - as per SICU  To continue monitoring of nutrition - Weights three times a week; Daily CMP, Mg, Ionized Ca, Phosphorus,        and Weekly Triglycerides and Pre-albumin  Continue as per SICU/Surgery, will follow with you, D/w primary team    Andreina Hubbard PA-C  TPN team, pager [phone]  D/w Ana M Chacko & MU Siegel A/P: 75 year old male w/ PMH of COPD and EtOH dependence with PSH/o appy, prostate surgery, & spine surgery  septic shock and high grade SBO s/p exploratory laparotomy, lysis of adhesions, decompression of bowel via enterotomy w/ primary repair, and Abthera VAC placement on 12/6; s/p take down of Abthera, washout and re-application of the Abthera vac on 12/8. Now s/p SBR and end ileostomy on 12/10.   TPN consulted to assist w/ management of pt's nutrition in pt w/ prolonged hospital course now tolerating diet but has high Ileostomy output.  Pt s/p Lt Chest Pigtail for effusion in IR with persistent high output on 1/10/2020 pt had VATs & placement of Left PleurX catheter. Pt op pt developed hemothorax and Respiratory Distress.  Pt is s/p Lt chest Evacuation of 2L blood w/ placement of CT x2 on 1/15/2020 for Lt Pleural Effusion that's resolving and doing well after CT removed and Improving Rt PNA vs Pleural Effusion.     Pt remains in SICU w/improving High Ostomy Output, and improving Hypotension w/o evidence of Sepsis.   Plan to Reverse Ileostomy in February, possibly next week  Pt w/ improving severe Protein-Calorie Malnutrition-        Short Gut Syndrome w/Malabsorption        TPN doing well at 1/2 Goal- pt only able to eat approximately 1/2 of nutritional goals            Amino Acids 60g, Dextrose 140g, and Lipids 30g in 1600mL with 3mL MTE & 10mL MVI        Pt tolerating Soft Mechanical w/ Ensure & Prosource supplements           Ostomy Output and effusion improving - decreasing nutritional losses    -Concern for adrenal insufficiency (1/26 AM cortisol = 5.9)        ACTH Stim test pending  - HypoCa- increased Ca in TPN to 14mEq- continue to monitor        Improved Ca levels helpful for BP & muscle contraction  -HypoPhos- improving w/ NaPhos 45mMol in TPN & will continue to monitor   -Fecal fat testing suggestive of decreased absorption of fat -         TPN w/ MVI to compensate for low Vit E & A        Vit D levels low- Ergocalciferol supplements started        Vit K INR/ PT near normal suggestive that it is being absor  -Edema greatly improved and Fluid overload resolved           w/ recent h/o HypoTN responded well to IVF           Increased total vol TPN to 1600 from 1200mL           Added NaCl 40mEq to TPN  -Increased Urine Output- DDAVP ordered as pt had good response previously         Na wnl- low probability of DI  -Strict Intake and Output -            Improving ostomy output           Continue to monitor while on octreotide, lomotil, tincture of opium, & imodium  -Leukocytosis- resolved- observing off abx         blood cx ntd   Hyperglycemia-improving      Fingersticks & ISS coverage - as per SICU  To continue monitoring of nutrition - Weights three times a week; Daily CMP, Mg, Ionized Ca, Phosphorus,        and Weekly Triglycerides and Pre-albumin  Continue as per SICU/Surgery, will follow with you, D/w primary team    Andreina Hubbard PA-C  TPN team, pager [phone]  D/w Ana M Chacko & MU Siegel

## 2020-01-28 NOTE — PROGRESS NOTE ADULT - ASSESSMENT
75 y/o male presenting with high grade SBO s/p exploratory laparotomy, lysis of adhesions, decompression of bowel via enterotomy w/ primary repair, & Abthera VAC placement (12/06/2019); RTOR for re-exploration w/ Abthera VAC replacement (12/08/2019) to allow for demarcation of ischemic small bowel; RTOR for re-exploration, small bowel resection of 150 cm (150 cm remaining), ileocecetomy, end ileostomy, mucous fistula, and abdominal closure (12/10/2019); hospital course complicated by SVT, short bowel syndrome requiring repletions w/ IV fluids, malnutrition requiring TPN, intermittent episodes of hypotension, spontaneous left pneumothorax s/p pigtail catheter (12/15/2019-12/24/2019), left pleural effusion s/p pigtail catheter w/ IR (12/30/2019), dysphagia, and oral HSV lesions, placement of Pleurx on 1/10, now re-admitted to SICU with acute hypoxic respiratory failure s/p repeat VATS with 2 liter hemothorax removed. Respiratory failure resolved, now stable in SICU.    Neuro: no acute issues  - Pain control as needed with acetaminophen    Resp: COPD, spontaneous left pneumothorax s/p pigtail catheter, left pleural effusion s/p Pleurx 1/10.  Intubated for respiratory distress s/p VATS 01/15 with 2 Liter of blood evacuation s/p removal of chest tubes   - Pulmicort and Duoneb for COPD  - Will need outpatient follow-up with a pulmonologist for his COPD as patient does not currently have one  - Daily CXR    CV: Bradycardia, SVT.  - No interval dysrhythmias     GI: s/p exploratory laparotomy, Grecia, decompression of bowel via enterotomy w/ primary repair, & Abthera VAC placement (12/06/2019); re-exploration w/ ABthera VAC replacement (12/08/2019); re-exploration, small bowel resection of 150 cm (150 cm remaining), ileocecectomy end ileostomy, mucous fistula, and abdominal closure (12/10/2019); short bowel syndrome, malnutrition, dysphagia  - Mechanical soft diet + TPN  - Monitor ostomy output  - Continue Lomotil, tincture of opium, loperamide, metamucil, and octreotide for short bowel syndrome  - Protonix to decrease gastric secretions    Renal: 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    Disposition: Will remain in SICU. 75 y/o male presenting with high grade SBO s/p exploratory laparotomy, lysis of adhesions, decompression of bowel via enterotomy w/ primary repair, & Abthera VAC placement (12/06/2019); RTOR for re-exploration w/ Abthera VAC replacement (12/08/2019) to allow for demarcation of ischemic small bowel; RTOR for re-exploration, small bowel resection of 150 cm (150 cm remaining), ileocecetomy, end ileostomy, mucous fistula, and abdominal closure (12/10/2019); hospital course complicated by SVT, short bowel syndrome requiring repletions w/ IV fluids, malnutrition requiring TPN, intermittent episodes of hypotension, spontaneous left pneumothorax s/p pigtail catheter (12/15/2019-12/24/2019), left pleural effusion s/p pigtail catheter w/ IR (12/30/2019), dysphagia, and oral HSV lesions, placement of Pleurx on 1/10, now re-admitted to SICU with acute hypoxic respiratory failure s/p repeat VATS with 2 liter hemothorax removed. Respiratory failure resolved, now stable in SICU.    Neuro: no acute issues  - Pain control as needed with acetaminophen    Resp: COPD, spontaneous left pneumothorax s/p pigtail catheter, left pleural effusion s/p Pleurx 1/10.  Intubated for respiratory distress s/p VATS 01/15 with 2 Liter of blood evacuation s/p removal of chest tubes   - Pulmicort and Duoneb for COPD  - Will need outpatient follow-up with a pulmonologist for his COPD as patient does not currently have one  - Daily CXR    CV: Bradycardia, SVT.  - No interval dysrhythmias     GI: s/p exploratory laparotomy, Grecia, decompression of bowel via enterotomy w/ primary repair, & Abthera VAC placement (12/06/2019); re-exploration w/ ABthera VAC replacement (12/08/2019); re-exploration, small bowel resection of 150 cm (150 cm remaining), ileocecectomy end ileostomy, mucous fistula, and abdominal closure (12/10/2019); short bowel syndrome, malnutrition, dysphagia  - Mechanical soft diet + TPN  - Monitor ostomy output  - Continue Lomotil, tincture of opium, loperamide, metamucil, and octreotide for short bowel syndrome  - Protonix to decrease gastric secretions    Renal: polyuria 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 s/p DDAVP with decrease in urine output (last dose 1/25)  - Monitor I&Os and daily weights   - Monitor electrolytes and replete as necessary  - Flomax for urinary retention  -2MCG DDAVP SubQ administered   -I L Plasma-lyte given   -F/u Cosyntropin test     Heme: anemia likely secondary to malnutrition / malabsorption  - Monitor CBC and coags   - Lovenox for VTE prophylaxis    ID: oral HSV lesions (s/p acyclovir 12/23/2019-01/02/2020); completed course of meropenem for Zosyn-resistant Pseudomonas PNA   - Monitor for fever, culture if febrile    Endo: no acute issues  - Monitor glucose on BMPs   - Has not required any insulin coverage, will discontinue insulin sliding scale.    Disposition: Will remain in SICU.

## 2020-01-28 NOTE — PROGRESS NOTE ADULT - SUBJECTIVE AND OBJECTIVE BOX
Subjective: Patient seen and examined. No new events except as noted.   remains in ICU   No cp or sob     REVIEW OF SYSTEMS:    CONSTITUTIONAL:+ weakness, fevers or chills  EYES/ENT: No visual changes;  No vertigo or throat pain   NECK: No pain or stiffness  RESPIRATORY: No cough, wheezing, hemoptysis; No shortness of breath  CARDIOVASCULAR: No chest pain or palpitations  GASTROINTESTINAL: No abdominal or epigastric pain. No nausea, vomiting, or hematemesis; No diarrhea or constipation. No melena or hematochezia.  GENITOURINARY: No dysuria, frequency or hematuria  NEUROLOGICAL: No numbness or weakness  SKIN: No itching, burning, rashes, or lesions   All other review of systems is negative unless indicated above.    MEDICATIONS:  MEDICATIONS  (STANDING):  albuterol/ipratropium for Nebulization. 3 milliLiter(s) Nebulizer every 6 hours  buDESOnide    Inhalation Suspension 0.5 milliGRAM(s) Inhalation every 12 hours  chlorhexidine 2% Cloths 1 Application(s) Topical <User Schedule>  diphenoxylate/atropine 2 Tablet(s) Oral <User Schedule>  enoxaparin Injectable 40 milliGRAM(s) SubCutaneous daily  ergocalciferol 78246 Unit(s) Oral every week  fat emulsion (Fish Oil and Plant Based) 20% Infusion 12.5 mL/Hr (12.5 mL/Hr) IV Continuous <Continuous>  loperamide 16 milliGRAM(s) Oral <User Schedule>  octreotide  Injectable 100 MICROGram(s) SubCutaneous three times a day  opium Tincture 6 milliGRAM(s) Oral <User Schedule>  pantoprazole    Tablet 40 milliGRAM(s) Oral before breakfast  Parenteral Nutrition - Adult 1 Each (71 mL/Hr) TPN Continuous <Continuous>  Parenteral Nutrition - Adult 1 Each (50 mL/Hr) TPN Continuous <Continuous>  psyllium Powder 1 Packet(s) Oral three times a day  tamsulosin 0.4 milliGRAM(s) Oral daily      PHYSICAL EXAM:  T(C): 37 (01-28-20 @ 11:00), Max: 37.4 (01-27-20 @ 19:00)  HR: 79 (01-28-20 @ 11:21) (67 - 92)  BP: 151/69 (01-28-20 @ 11:00) (108/57 - 151/69)  RR: 24 (01-28-20 @ 11:00) (13 - 31)  SpO2: 95% (01-28-20 @ 11:21) (90% - 100%)  Wt(kg): --  I&O's Summary    27 Jan 2020 07:01  -  28 Jan 2020 07:00  --------------------------------------------------------  IN: 2812.5 mL / OUT: 4200 mL / NET: -1387.5 mL    28 Jan 2020 07:01  -  28 Jan 2020 11:37  --------------------------------------------------------  IN: 1850 mL / OUT: 1050 mL / NET: 800 mL          Appearance: NAD	  HEENT:   Normal oral mucosa, PERRL, EOMI	  Lymphatic: No lymphadenopathy , no edema  Cardiovascular: Normal S1 S2, No JVD, No murmurs , Peripheral pulses palpable 2+ bilaterally  Respiratory: Lungs clear to auscultation, normal effort 	  Gastrointestinal:  Soft, Non-tender, + Ileostomy   Skin: No rashes, No ecchymoses, No cyanosis, warm to touch  Musculoskeletal: Normal range of motion, normal strength  Psychiatry:  Mood & affect appropriate  Ext: No edema  +rosas     LABS:    CARDIAC MARKERS:  CARDIAC MARKERS ( 26 Jan 2020 02:08 )  x     / x     / 31 U/L / x     / 4.2 ng/mL                                8.1    8.59  )-----------( 282      ( 28 Jan 2020 00:26 )             25.9     01-28    137  |  95<L>  |  19  ----------------------------<  144<H>  4.6   |  31  |  0.41<L>    Ca    8.5      28 Jan 2020 00:26  Phos  3.3     01-28  Mg     1.8     01-28    TPro  6.3  /  Alb  2.6<L>  /  TBili  0.5  /  DBili  0.2  /  AST  21  /  ALT  28  /  AlkPhos  80  01-28    proBNP:   Lipid Profile:   HgA1c:   TSH:               TELEMETRY: 	SR    ECG:  	  RADIOLOGY: < from: Xray Chest 1 View- PORTABLE-Routine (01.27.20 @ 06:50) >    EXAM:  XR CHEST PORTABLE ROUTINE 1V                            PROCEDURE DATE:  01/27/2020            INTERPRETATION:  A single chest x-ray was obtained on January 27, 2020.    Indication: Status post VATS.    Impression:    The heart is normal in size. Bibasilar pneumonia cannot be ruled out entirely. A PICC line is seen on the right and the tip is in the superior vena cava. No pneumothorax. Calcified aortic knob.                    TRUDI DIETZ M.D., ATTENDING RADIOLOGIST  This document has been electronically signed. Jan 27 2020 10:51AM                < end of copied text >    DIAGNOSTIC TESTING:  [ ] Echocardiogram:  [ ]  Catheterization:  [ ] Stress Test:    OTHER:

## 2020-01-28 NOTE — PROGRESS NOTE ADULT - SUBJECTIVE AND OBJECTIVE BOX
GENERAL SURGERY PROGRESS NOTE    SUBJECTIVE/24 HOUR EVENTS:   - No acute events overnight. BP remains stable. Ileostomy output stable  - Patient seen and examined at bedside  - Patient feels well, tolerating diet, on TPN, oob/ambi, pain controlled, denies f/c/n/v/d/sob,      PHYSICAL EXAM:  General: Appears well, NAD  Neuro: AAOx3  CHEST: Clear to auscultation bilaterally,  CV: Regular rate and rhythm  Abdomen: soft, nontender, nondistended, no rebound or guarding, ostomy pink, intact, draining semisolid  Extremities: Grossly symmetric    V/S:  Vital Signs Last 24 Hrs  T(C): 37.4 (28 Jan 2020 07:00), Max: 37.4 (27 Jan 2020 19:00)  T(F): 99.3 (28 Jan 2020 07:00), Max: 99.3 (27 Jan 2020 19:00)  HR: 76 (28 Jan 2020 10:00) (67 - 92)  BP: 147/68 (28 Jan 2020 10:00) (108/57 - 147/68)  BP(mean): 98 (28 Jan 2020 10:00) (79 - 100)  RR: 24 (28 Jan 2020 10:00) (13 - 31)  SpO2: 99% (28 Jan 2020 10:00) (90% - 100%)    --------------------------------------------------------------------------------------------------  I/Os:    27 Jan 2020 07:01  -  28 Jan 2020 07:00  --------------------------------------------------------  IN:    fat emulsion (Fish Oil and Plant Based) 20% Infusion: 162.5 mL    Oral Fluid: 1400 mL    Solution: 50 mL    TPN (Total Parenteral Nutrition): 1200 mL  Total IN: 2812.5 mL    OUT:    Ileostomy: 2000 mL    Voided: 2200 mL  Total OUT: 4200 mL    Total NET: -1387.5 mL      28 Jan 2020 07:01  -  28 Jan 2020 10:07  --------------------------------------------------------  IN:    multiple electrolytes Injection Type 1 Bolus: 1000 mL    Oral Fluid: 500 mL    TPN (Total Parenteral Nutrition): 150 mL  Total IN: 1650 mL    OUT:    Voided: 550 mL  Total OUT: 550 mL    Total NET: 1100 mL        --------------------------------------------------------------------------------------------------  LABS:                        8.1    8.59  )-----------( 282      ( 28 Jan 2020 00:26 )             25.9     28 Jan 2020 00:26    137    |  95     |  19     ----------------------------<  144    4.6     |  31     |  0.41     Ca    8.5        28 Jan 2020 00:26  Phos  3.3       28 Jan 2020 00:26  Mg     1.8       28 Jan 2020 00:26    TPro  6.3    /  Alb  2.6    /  TBili  0.5    /  DBili  0.2    /  AST  21     /  ALT  28     /  AlkPhos  80     28 Jan 2020 00:26      CAPILLARY BLOOD GLUCOSE            LIVER FUNCTIONS - ( 28 Jan 2020 00:26 )  Alb: 2.6 g/dL / Pro: 6.3 g/dL / ALK PHOS: 80 U/L / ALT: 28 U/L / AST: 21 U/L / GGT: x             Culture - Blood (collected 26 Jan 2020 03:52)  Source: .Blood Blood  Preliminary Report (27 Jan 2020 04:01):    No growth to date.    Culture - Blood (collected 26 Jan 2020 03:52)  Source: .Blood Blood  Preliminary Report (27 Jan 2020 04:01):    No growth to date.        --------------------------------------------------------------------------------------------------  MEDICATIONS  (STANDING):  albuterol/ipratropium for Nebulization. 3 milliLiter(s) Nebulizer every 6 hours  buDESOnide    Inhalation Suspension 0.5 milliGRAM(s) Inhalation every 12 hours  chlorhexidine 2% Cloths 1 Application(s) Topical <User Schedule>  desmopressin Injectable 2 MICROGram(s) SubCutaneous once  diphenoxylate/atropine 2 Tablet(s) Oral <User Schedule>  enoxaparin Injectable 40 milliGRAM(s) SubCutaneous daily  ergocalciferol 15278 Unit(s) Oral every week  fat emulsion (Fish Oil and Plant Based) 20% Infusion 12.5 mL/Hr (12.5 mL/Hr) IV Continuous <Continuous>  loperamide 16 milliGRAM(s) Oral <User Schedule>  multiple electrolytes Injection Type 1 Bolus 1000 milliLiter(s) IV Bolus once  octreotide  Injectable 100 MICROGram(s) SubCutaneous three times a day  opium Tincture 6 milliGRAM(s) Oral <User Schedule>  pantoprazole    Tablet 40 milliGRAM(s) Oral before breakfast  Parenteral Nutrition - Adult 1 Each (50 mL/Hr) TPN Continuous <Continuous>  psyllium Powder 1 Packet(s) Oral three times a day  tamsulosin 0.4 milliGRAM(s) Oral daily    MEDICATIONS  (PRN):  acetaminophen   Tablet .. 975 milliGRAM(s) Oral every 6 hours PRN Mild Pain (1 - 3)  diphenhydramine 2%/zinc acetate 0.1% Cream 1 Application(s) Topical two times a day PRN Itching  ondansetron Injectable 4 milliGRAM(s) IV Push every 6 hours PRN Nausea and/or Vomiting

## 2020-01-28 NOTE — PROGRESS NOTE ADULT - ASSESSMENT
74 y/o M presenting with septic shock and high grade SBO s/p exploratory laparotomy, lysis of adhesions, decompression of bowel via enterotomy w/ primary repair, and Abthera VAC placement on 12/6; s/p take down of Abthera, washout and re-application of the Abthera vac on 12/8. 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 s/p L chest tube placement by IR 12/30 for pleural effusion now s/p VATS, with chest tube insertion for drainage of pleural effusion. RRT called 1/13 AM for hypoxia, patient transferred back to SICU.  Patient continued SOB, chest drain possible obstruction expanding. Patent taken back to OR emergently 1/15 for clot evac and VATS.     PLAN:  - f/u blood cultures  - Continue diet as tolerated  - Monitor Ileostomy output  - OOB to chair  - Continue Lomotil, loperamide, tincture of opium, octreotide  - Appreciate continued SICU care    [facility]  [id number]

## 2020-01-28 NOTE — CHART NOTE - NSCHARTNOTEFT_GEN_A_CORE
Nutrition Follow Up Note.    Patient seen for: malnutrition/TPN Team follow up.    Source: patient, medical record, TPN Team rounds    Chart reviewed, events noted.     Nutrition Status: Malnutrition. Pt with 150 cm small bowel remaining after GI surgery x 3. TPN initiated . Pt has been tolerating  po diet since . TPN discontinued as of 1/15; resumed at half goal . Per 8ICU team, pt is planned for revision in early February.     Per 8ICU team request, total volume of TPN to be increased to 1700ml. Sodium chloride increased to 40mEq in TPN.    Pt with ongoing high ileostomy output, but improvement noted. Fecal fat results indicate some degree of fat malabsorption. Rx for Lomotil, Imodium, opium tincture, metamucil powder, and octreotide noted.    Diet: Mechanical Soft; 2 servings Ensure Enlive; 1 serving Prosource    Parenteral Nutrition: Half-dose TPN (ordered ) 1200ml infusing at 50ml/hr (60Gm amino acids, 140Gm dextrose, 30Gm SMOF lipids) to provide 1016cal/day (18cal/Kg and 1.1Gm protein/Kg per dosing wt 54.2Kg); with 10ml MVI, 3ml MTE-5.     PO Intake: Pt reports improved po intake. Pt is drinking Ensure Enlive, and enjoys Magic Cup supplements, but needs encouragement to take 1 Prosource daily.    Ileostomy output: 1700ml (), 2000ml ()    Daily Weight in k.5 (), Weight in k.5 (), Weight in k.2 (), Weight in k.5 (), Weight in k.6 (), Weight in k.2 (), Weight in k.1 ()    Drug Dosing Weight  Weight (kg): 54 (15 Isma 2020 15:12)  BMI (kg/m2): 17.6 (15 Isma 2020 15:12)    Pertinent Medications: MEDICATIONS  (STANDING):  albuterol/ipratropium for Nebulization. 3 milliLiter(s) Nebulizer every 6 hours  buDESOnide    Inhalation Suspension 0.5 milliGRAM(s) Inhalation every 12 hours  chlorhexidine 2% Cloths 1 Application(s) Topical <User Schedule>  diphenoxylate/atropine 2 Tablet(s) Oral <User Schedule>  enoxaparin Injectable 40 milliGRAM(s) SubCutaneous daily  ergocalciferol 22738 Unit(s) Oral every week  fat emulsion (Fish Oil and Plant Based) 20% Infusion 12.5 mL/Hr (12.5 mL/Hr) IV Continuous <Continuous>  loperamide 16 milliGRAM(s) Oral <User Schedule>  octreotide  Injectable 100 MICROGram(s) SubCutaneous three times a day  opium Tincture 6 milliGRAM(s) Oral <User Schedule>  pantoprazole    Tablet 40 milliGRAM(s) Oral before breakfast  Parenteral Nutrition - Adult 1 Each (71 mL/Hr) TPN Continuous <Continuous>  Parenteral Nutrition - Adult 1 Each (50 mL/Hr) TPN Continuous <Continuous>  psyllium Powder 1 Packet(s) Oral three times a day  tamsulosin 0.4 milliGRAM(s) Oral daily    MEDICATIONS  (PRN):  acetaminophen   Tablet .. 975 milliGRAM(s) Oral every 6 hours PRN Mild Pain (1 - 3)  diphenhydramine 2%/zinc acetate 0.1% Cream 1 Application(s) Topical two times a day PRN Itching  ondansetron Injectable 4 milliGRAM(s) IV Push every 6 hours PRN Nausea and/or Vomiting    LABS:    @ 00:26: Sodium 137, Potassium 4.6, Chloride 95<L>, Calcium 8.5, Magnesium 1.8, Phosphorus 3.3, BUN 19, Creatinine 0.41<L>, <H>, Alk Phos 80, ALT/SGPT 28, AST/SGOT 21, HbA1c --, Total Protein 6.3, Albumin 2.6<L>, Total Bilirubin 0.5, Direct Bilirubin 0.2, Hemoglobin 8.1<L>, Hematocrit 25.9<L>    Skin per nursing documentation: no pressure injuries documented  Edema (): 2+ left/right foot, ankle    Estimated Needs: based on dosing wt 54.2Kg, with consideration for TPN, malnutrition  [id number] gregorio/day (25-30cal/Kg)   Gm protein/day (1.8-2.2Gm/Kg)     Previous Nutrition Diagnosis: Severe malnutrition  Nutrition Diagnosis is: ongoing, being addressed po diet, supplements, and half dose TPN    New Nutrition Diagnosis: none     Interventions: Continue TPN at half goal; monitor po intake    Recommend  1) TPN per TPN Team/Nutrition assessment; continue at half goal until surgery in February. Total volume increased .  2) Continue Mechanical Soft diet. Food preferences taken; Magic Cup supplement (290 calories, 9 Gm protein) bid added to meal trays.  3) Continue 2 servings Ensure Enlive (350cal, 20Gm protein per 8oz serving)   4) Continue Prosource (60cal, 15Gm protein), 1 serving daily    Monitoring and Evaluation:     Continue to monitor nutrition provision and tolerance, weights, labs, skin integrity.    RD remains available upon request and will follow up per protocol.    Sowmya Graham MS RD CDN [facility], Pager # [phone].

## 2020-01-29 LAB
ALBUMIN SERPL ELPH-MCNC: 2.6 G/DL — LOW (ref 3.3–5)
ALP SERPL-CCNC: 86 U/L — SIGNIFICANT CHANGE UP (ref 40–120)
ALT FLD-CCNC: 42 U/L — SIGNIFICANT CHANGE UP (ref 10–45)
ANION GAP SERPL CALC-SCNC: 7 MMOL/L — SIGNIFICANT CHANGE UP (ref 5–17)
AST SERPL-CCNC: 30 U/L — SIGNIFICANT CHANGE UP (ref 10–40)
BILIRUB DIRECT SERPL-MCNC: 0.3 MG/DL — HIGH (ref 0–0.2)
BILIRUB INDIRECT FLD-MCNC: 0.2 MG/DL — SIGNIFICANT CHANGE UP (ref 0.2–1)
BILIRUB SERPL-MCNC: 0.5 MG/DL — SIGNIFICANT CHANGE UP (ref 0.2–1.2)
BUN SERPL-MCNC: 21 MG/DL — SIGNIFICANT CHANGE UP (ref 7–23)
CA-I BLD-SCNC: 1.12 MMOL/L — SIGNIFICANT CHANGE UP (ref 1.12–1.3)
CALCIUM SERPL-MCNC: 8.2 MG/DL — LOW (ref 8.4–10.5)
CHLORIDE SERPL-SCNC: 95 MMOL/L — LOW (ref 96–108)
CO2 SERPL-SCNC: 31 MMOL/L — SIGNIFICANT CHANGE UP (ref 22–31)
CREAT SERPL-MCNC: 0.41 MG/DL — LOW (ref 0.5–1.3)
CULTURE RESULTS: SIGNIFICANT CHANGE UP
GAS PNL BLDV: SIGNIFICANT CHANGE UP
GLUCOSE SERPL-MCNC: 109 MG/DL — HIGH (ref 70–99)
HCT VFR BLD CALC: 26.1 % — LOW (ref 39–50)
HGB BLD-MCNC: 8.3 G/DL — LOW (ref 13–17)
MAGNESIUM SERPL-MCNC: 1.8 MG/DL — SIGNIFICANT CHANGE UP (ref 1.6–2.6)
MCHC RBC-ENTMCNC: 30.9 PG — SIGNIFICANT CHANGE UP (ref 27–34)
MCHC RBC-ENTMCNC: 31.8 GM/DL — LOW (ref 32–36)
MCV RBC AUTO: 97 FL — SIGNIFICANT CHANGE UP (ref 80–100)
NRBC # BLD: 0 /100 WBCS — SIGNIFICANT CHANGE UP (ref 0–0)
PHOSPHATE SERPL-MCNC: 3 MG/DL — SIGNIFICANT CHANGE UP (ref 2.5–4.5)
PLATELET # BLD AUTO: 287 K/UL — SIGNIFICANT CHANGE UP (ref 150–400)
POTASSIUM SERPL-MCNC: 4.4 MMOL/L — SIGNIFICANT CHANGE UP (ref 3.5–5.3)
POTASSIUM SERPL-SCNC: 4.4 MMOL/L — SIGNIFICANT CHANGE UP (ref 3.5–5.3)
PROT SERPL-MCNC: 6.2 G/DL — SIGNIFICANT CHANGE UP (ref 6–8.3)
RBC # BLD: 2.69 M/UL — LOW (ref 4.2–5.8)
RBC # FLD: 14.5 % — SIGNIFICANT CHANGE UP (ref 10.3–14.5)
SODIUM SERPL-SCNC: 133 MMOL/L — LOW (ref 135–145)
SPECIMEN SOURCE: SIGNIFICANT CHANGE UP
WBC # BLD: 8.61 K/UL — SIGNIFICANT CHANGE UP (ref 3.8–10.5)
WBC # FLD AUTO: 8.61 K/UL — SIGNIFICANT CHANGE UP (ref 3.8–10.5)

## 2020-01-29 PROCEDURE — 99233 SBSQ HOSP IP/OBS HIGH 50: CPT

## 2020-01-29 PROCEDURE — 99232 SBSQ HOSP IP/OBS MODERATE 35: CPT

## 2020-01-29 RX ORDER — SODIUM,POTASSIUM PHOSPHATES 278-250MG
2 POWDER IN PACKET (EA) ORAL ONCE
Refills: 0 | Status: COMPLETED | OUTPATIENT
Start: 2020-01-29 | End: 2020-01-29

## 2020-01-29 RX ORDER — ELECTROLYTE SOLUTION,INJ
1 VIAL (ML) INTRAVENOUS
Refills: 0 | Status: DISCONTINUED | OUTPATIENT
Start: 2020-01-29 | End: 2020-01-30

## 2020-01-29 RX ORDER — I.V. FAT EMULSION 20 G/100ML
12.5 EMULSION INTRAVENOUS
Qty: 30 | Refills: 0 | Status: DISCONTINUED | OUTPATIENT
Start: 2020-01-29 | End: 2020-01-30

## 2020-01-29 RX ORDER — MAGNESIUM SULFATE 500 MG/ML
2 VIAL (ML) INJECTION ONCE
Refills: 0 | Status: COMPLETED | OUTPATIENT
Start: 2020-01-29 | End: 2020-01-29

## 2020-01-29 RX ADMIN — Medication 0.5 MILLIGRAM(S): at 05:46

## 2020-01-29 RX ADMIN — OCTREOTIDE ACETATE 100 MICROGRAM(S): 200 INJECTION, SOLUTION INTRAVENOUS; SUBCUTANEOUS at 21:48

## 2020-01-29 RX ADMIN — Medication 1 PACKET(S): at 14:54

## 2020-01-29 RX ADMIN — Medication 2 TABLET(S): at 22:16

## 2020-01-29 RX ADMIN — PANTOPRAZOLE SODIUM 40 MILLIGRAM(S): 20 TABLET, DELAYED RELEASE ORAL at 07:59

## 2020-01-29 RX ADMIN — Medication 3 MILLILITER(S): at 17:30

## 2020-01-29 RX ADMIN — Medication 16 MILLIGRAM(S): at 17:21

## 2020-01-29 RX ADMIN — I.V. FAT EMULSION 12.5 ML/HR: 20 EMULSION INTRAVENOUS at 17:01

## 2020-01-29 RX ADMIN — Medication 1 PACKET(S): at 21:49

## 2020-01-29 RX ADMIN — Medication 1 PACKET(S): at 08:51

## 2020-01-29 RX ADMIN — ENOXAPARIN SODIUM 40 MILLIGRAM(S): 100 INJECTION SUBCUTANEOUS at 12:04

## 2020-01-29 RX ADMIN — OCTREOTIDE ACETATE 100 MICROGRAM(S): 200 INJECTION, SOLUTION INTRAVENOUS; SUBCUTANEOUS at 07:07

## 2020-01-29 RX ADMIN — OCTREOTIDE ACETATE 100 MICROGRAM(S): 200 INJECTION, SOLUTION INTRAVENOUS; SUBCUTANEOUS at 14:06

## 2020-01-29 RX ADMIN — Medication 16 MILLIGRAM(S): at 08:32

## 2020-01-29 RX ADMIN — Medication 2 TABLET(S): at 07:58

## 2020-01-29 RX ADMIN — MORPHINE 6 MILLIGRAM(S): 10 SOLUTION ORAL at 17:26

## 2020-01-29 RX ADMIN — MORPHINE 6 MILLIGRAM(S): 10 SOLUTION ORAL at 12:11

## 2020-01-29 RX ADMIN — Medication 3 MILLILITER(S): at 05:46

## 2020-01-29 RX ADMIN — Medication 2 TABLET(S): at 12:06

## 2020-01-29 RX ADMIN — Medication 3 MILLILITER(S): at 00:16

## 2020-01-29 RX ADMIN — Medication 16 MILLIGRAM(S): at 12:08

## 2020-01-29 RX ADMIN — Medication 16 MILLIGRAM(S): at 22:17

## 2020-01-29 RX ADMIN — Medication 1 EACH: at 16:57

## 2020-01-29 RX ADMIN — Medication 2 PACKET(S): at 07:58

## 2020-01-29 RX ADMIN — Medication 0.5 MILLIGRAM(S): at 17:30

## 2020-01-29 RX ADMIN — TAMSULOSIN HYDROCHLORIDE 0.4 MILLIGRAM(S): 0.4 CAPSULE ORAL at 12:05

## 2020-01-29 RX ADMIN — Medication 3 MILLILITER(S): at 11:56

## 2020-01-29 RX ADMIN — MORPHINE 6 MILLIGRAM(S): 10 SOLUTION ORAL at 08:00

## 2020-01-29 RX ADMIN — CHLORHEXIDINE GLUCONATE 1 APPLICATION(S): 213 SOLUTION TOPICAL at 07:07

## 2020-01-29 RX ADMIN — Medication 50 GRAM(S): at 02:26

## 2020-01-29 RX ADMIN — MORPHINE 6 MILLIGRAM(S): 10 SOLUTION ORAL at 22:16

## 2020-01-29 RX ADMIN — Medication 2 TABLET(S): at 17:27

## 2020-01-29 NOTE — PROGRESS NOTE ADULT - ASSESSMENT
75 y/o male presenting with high grade SBO s/p exploratory laparotomy, lysis of adhesions, decompression of bowel via enterotomy w/ primary repair, & Abthera VAC placement (12/06/2019); RTOR for re-exploration w/ Abthera VAC replacement (12/08/2019) to allow for demarcation of ischemic small bowel; RTOR for re-exploration, small bowel resection of 150 cm (150 cm remaining), ileocecetomy, end ileostomy, mucous fistula, and abdominal closure (12/10/2019); hospital course complicated by SVT, short bowel syndrome requiring repletions w/ IV fluids, malnutrition requiring TPN, intermittent episodes of hypotension, spontaneous left pneumothorax s/p pigtail catheter (12/15/2019-12/24/2019), left pleural effusion s/p pigtail catheter w/ IR (12/30/2019), dysphagia, and oral HSV lesions, placement of Pleurx on 1/10, now re-admitted to SICU with acute hypoxic respiratory failure s/p repeat VATS with 2 liter hemothorax removed. Respiratory failure resolved, now stable in SICU.    Neuro: no acute issues  - Pain control as needed with acetaminophen    Resp: COPD, spontaneous left pneumothorax s/p pigtail catheter, left pleural effusion s/p Pleurx 1/10.  Intubated for respiratory distress s/p VATS 01/15 with 2 Liter of blood evacuation s/p removal of chest tubes   - Pulmicort and Duoneb for COPD  - Will need outpatient follow-up with a pulmonologist for his COPD as patient does not currently have one  - Daily CXR    CV: Bradycardia, SVT.  - No interval dysrhythmias     GI: s/p exploratory laparotomy, Grecia, decompression of bowel via enterotomy w/ primary repair, & Abthera VAC placement (12/06/2019); re-exploration w/ ABthera VAC replacement (12/08/2019); re-exploration, small bowel resection of 150 cm (150 cm remaining), ileocecectomy end ileostomy, mucous fistula, and abdominal closure (12/10/2019); short bowel syndrome, malnutrition, dysphagia  - Mechanical soft diet + TPN  - Monitor ostomy output  - Continue Lomotil, tincture of opium, loperamide, metamucil, and octreotide for short bowel syndrome  - Protonix to decrease gastric secretions    Renal: polyuria 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 s/p DDAVP with decrease in urine output (last dose 1/25)  - Monitor I&Os and daily weights   - Monitor electrolytes and replete as necessary  - Flomax for urinary retention  - 2MCG DDAVP SubQ administered   - I L Plasma-lyte given in the AM  - F/u Cosyntropin test     Heme: anemia likely secondary to malnutrition / malabsorption  - Monitor CBC and coags   - Lovenox for VTE prophylaxis    ID: oral HSV lesions (s/p acyclovir 12/23/2019-01/02/2020); completed course of meropenem for Zosyn-resistant Pseudomonas PNA   - Monitor for fever, culture if febrile    Endo: no acute issues  - Monitor glucose on BMPs   - Has not required any insulin coverage, will discontinue insulin sliding scale.    Disposition: Will remain in SICU. 73 y/o male presenting with high grade SBO s/p exploratory laparotomy, lysis of adhesions, decompression of bowel via enterotomy w/ primary repair, & Abthera VAC placement (12/06/2019); RTOR for re-exploration w/ Abthera VAC replacement (12/08/2019) to allow for demarcation of ischemic small bowel; RTOR for re-exploration, small bowel resection of 150 cm (150 cm remaining), ileocecetomy, end ileostomy, mucous fistula, and abdominal closure (12/10/2019); hospital course complicated by SVT, short bowel syndrome requiring repletions w/ IV fluids, malnutrition requiring TPN, intermittent episodes of hypotension, spontaneous left pneumothorax s/p pigtail catheter (12/15/2019-12/24/2019), left pleural effusion s/p pigtail catheter w/ IR (12/30/2019), dysphagia, and oral HSV lesions, placement of Pleurx on 1/10, now re-admitted to SICU with acute hypoxic respiratory failure s/p repeat VATS with 2 liter hemothorax removed. Respiratory failure resolved, now stable in SICU.    Neuro: no acute issues  - Pain control as needed with acetaminophen    Resp: COPD, spontaneous left pneumothorax s/p pigtail catheter, left pleural effusion s/p Pleurx 1/10.  Intubated for respiratory distress s/p VATS 01/15 with 2 Liter of blood evacuation s/p removal of chest tubes   - Pulmicort and Duoneb for COPD  - Will need outpatient follow-up with a pulmonologist for his COPD as patient does not currently have one  - Daily CXR    CV: Bradycardia, SVT.  - No interval dysrhythmias     GI: s/p exploratory laparotomy, Grecia, decompression of bowel via enterotomy w/ primary repair, & Abthera VAC placement (12/06/2019); re-exploration w/ ABthera VAC replacement (12/08/2019); re-exploration, small bowel resection of 150 cm (150 cm remaining), ileocecectomy end ileostomy, mucous fistula, and abdominal closure (12/10/2019); short bowel syndrome, malnutrition, dysphagia  - Mechanical soft diet + TPN  - Monitor ostomy output  - Continue Lomotil, tincture of opium, loperamide, metamucil, and octreotide for short bowel syndrome  - Protonix to decrease gastric secretions    Renal: polyuria 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 s/p DDAVP with decrease in urine output (last dose 1/28)  - Monitor I&Os and daily weights   - Monitor electrolytes and replete as necessary  - Flomax for urinary retention  - 2MCG DDAVP SubQ administered on 1/28 - follow up urine output      Heme: anemia likely secondary to malnutrition / malabsorption  - Monitor CBC and coags   - Lovenox for VTE prophylaxis    ID: oral HSV lesions (s/p acyclovir 12/23/2019-01/02/2020); completed course of meropenem for Zosyn-resistant Pseudomonas PNA   - Monitor for fever, culture if febrile    Endo: no acute issues  - Monitor glucose on BMPs   - F/u Cosyntropin test results      Disposition: Will remain in SICU.

## 2020-01-29 NOTE — PROGRESS NOTE ADULT - ATTENDING COMMENTS
Awake and alert, comfortable  Saturating well, off chest tube, CXR no ptx or significant effusion  PO/TPN volume increased to match for high volume output. On high dose multiple anti prokinetic meds for high ileostomy output  ACTH stimulation test result pending  PT/mobilization

## 2020-01-29 NOTE — PROGRESS NOTE ADULT - SUBJECTIVE AND OBJECTIVE BOX
GENERAL SURGERY PROGRESS NOTE    SUBJECTIVE/24 HOUR EVENTS:   - 1L plasmalyte given in the AM for repletions  - DDAVP dosed  - No acute events overnight. BP remains stable.  - Patient seen and examined at bedside  - Patient feels well, tolerating diet, on TPN, oob/ambi, pain controlled, denies f/c/n/v/d/sob    PHYSICAL EXAM:  General: Appears well, NAD  Neuro: AAOx3  CHEST: Clear to auscultation bilaterally,  CV: Regular rate and rhythm  Abdomen: soft, nontender, nondistended, no rebound or guarding, ostomy pink, intact, draining semisolid  Extremities: Grossly symmetric    V/S:  Vital Signs Last 24 Hrs  T(C): 37.2 (29 Jan 2020 05:00), Max: 37.5 (28 Jan 2020 19:00)  T(F): 99 (29 Jan 2020 05:00), Max: 99.5 (28 Jan 2020 19:00)  HR: 79 (29 Jan 2020 07:00) (67 - 99)  BP: 96/53 (29 Jan 2020 07:00) (96/53 - 155/73)  BP(mean): 69 (29 Jan 2020 07:00) (69 - 104)  RR: 31 (29 Jan 2020 07:00) (18 - 31)  SpO2: 97% (29 Jan 2020 07:00) (94% - 100%)    --------------------------------------------------------------------------------------------------  I/Os:    28 Jan 2020 07:01  -  29 Jan 2020 07:00  --------------------------------------------------------  IN:    fat emulsion (Fish Oil and Plant Based) 20% Infusion: 187.5 mL    multiple electrolytes Injection Type 1 Bolus: 1000 mL    Oral Fluid: 2190 mL    Solution: 50 mL    TPN (Total Parenteral Nutrition): 1455 mL  Total IN: 4882.5 mL    OUT:    Ileostomy: 2900 mL    Voided: 1800 mL  Total OUT: 4700 mL    Total NET: 182.5 mL        --------------------------------------------------------------------------------------------------  LABS:                        8.3    8.61  )-----------( 287      ( 29 Jan 2020 00:52 )             26.1     29 Jan 2020 00:52    133    |  95     |  21     ----------------------------<  109    4.4     |  31     |  0.41     Ca    8.2        29 Jan 2020 00:52  Phos  3.0       29 Jan 2020 00:52  Mg     1.8       29 Jan 2020 00:52    TPro  6.2    /  Alb  2.6    /  TBili  0.5    /  DBili  0.3    /  AST  30     /  ALT  42     /  AlkPhos  86     29 Jan 2020 00:52      CAPILLARY BLOOD GLUCOSE            LIVER FUNCTIONS - ( 29 Jan 2020 00:52 )  Alb: 2.6 g/dL / Pro: 6.2 g/dL / ALK PHOS: 86 U/L / ALT: 42 U/L / AST: 30 U/L / GGT: x               --------------------------------------------------------------------------------------------------  MEDICATIONS  (STANDING):  albuterol/ipratropium for Nebulization. 3 milliLiter(s) Nebulizer every 6 hours  buDESOnide    Inhalation Suspension 0.5 milliGRAM(s) Inhalation every 12 hours  chlorhexidine 2% Cloths 1 Application(s) Topical <User Schedule>  diphenoxylate/atropine 2 Tablet(s) Oral <User Schedule>  enoxaparin Injectable 40 milliGRAM(s) SubCutaneous daily  ergocalciferol 47202 Unit(s) Oral every week  fat emulsion (Fish Oil and Plant Based) 20% Infusion 12.5 mL/Hr (12.5 mL/Hr) IV Continuous <Continuous>  loperamide 16 milliGRAM(s) Oral <User Schedule>  octreotide  Injectable 100 MICROGram(s) SubCutaneous three times a day  opium Tincture 6 milliGRAM(s) Oral <User Schedule>  pantoprazole    Tablet 40 milliGRAM(s) Oral before breakfast  Parenteral Nutrition - Adult 1 Each (67 mL/Hr) TPN Continuous <Continuous>  potassium phosphate / sodium phosphate powder 2 Packet(s) Oral once  psyllium Powder 1 Packet(s) Oral three times a day  tamsulosin 0.4 milliGRAM(s) Oral daily    MEDICATIONS  (PRN):  acetaminophen   Tablet .. 975 milliGRAM(s) Oral every 6 hours PRN Mild Pain (1 - 3)  diphenhydramine 2%/zinc acetate 0.1% Cream 1 Application(s) Topical two times a day PRN Itching  ondansetron Injectable 4 milliGRAM(s) IV Push every 6 hours PRN Nausea and/or Vomiting

## 2020-01-29 NOTE — PROGRESS NOTE ADULT - ATTENDING COMMENTS
seen and examined 01-29-20 @ 0930    tolerating regular diet w/o nausea or vomiting    soft / NT / ND  ileostomy healthy  incision healed  2+bilateral pedal edema to ankles    12/6 - 12/10/2019 - staged ileocolic resection (included 165 cm of small bowel) with end ileostomy and mucous fistula for strangulated SBO      severe protein-calorie malnutrition secondary to short bowel syndrome  -continue TPN at half goal    chronic respiratory acidosis with mild metabolic alkalosis  fluid overload resolved  -continue NaAc 0 mEq  -continue NaCl 40 mEq    hyponatremia secondary to DDAVP (given for polyuria)  possibly mild adrenal insufficiency (1/26 AM cortisol = 5.9)  -f/u ACTH stimulation test results    hypocalcemia  -continue CaGluc 14 mEq    hypophosphatemia  -increase NaPhos 45 -> 60 mmol

## 2020-01-29 NOTE — PROGRESS NOTE ADULT - SUBJECTIVE AND OBJECTIVE BOX
[facility] NUTRITION SUPPORT / TPN -- FOLLOW UP NOTE  --------------------------------------------------------------------------------    24 hour events/subjective:  Pt w/o c/o N/V     tolerating diet  Tired today- didn't sleep well     was OOB ambulating yesterday  Concerned about sepsis-  observed off abx     cx sent - ntd     no leukocytosis     troponins noted     acth stim test pending   increased Urine Output=  DDAVP given- good response        pt feels less swollen  c/o gas pain  No cough/ cp/ palp/dyspnea/ sob  No f/c/s  no N/V  Increased ostomy output w/ [id number] over 24 hours             continue lomotil, imodium, octreotide, tincture of opium at max doses        Diet:  Diet, Mechanical Soft:   No Carb Prosource (1pkg = 15gms Protein)     Qty per Day:  1  Supplement Feeding Modality:  Oral  Ensure Enlive Cans or Servings Per Day:  2       Frequency:  Daily (01-24-20 @ 11:32)      Appetite: [  ]Poor [  ]Adequate [x  ]Good  Caloric intake:  [ x  ]  Adequate   [   ] Inadequate    ROS: General/ GI see HPI  all other systems negative      ALLERGIES & MEDICATIONS  --------------------------------------------------------------------------------  ALLERGIES  IV Contrast (Hives)    STANDING INPATIENT MEDICATIONS    albuterol/ipratropium for Nebulization. 3 milliLiter(s) Nebulizer every 6 hours  buDESOnide    Inhalation Suspension 0.5 milliGRAM(s) Inhalation every 12 hours  chlorhexidine 2% Cloths 1 Application(s) Topical <User Schedule>  diphenoxylate/atropine 2 Tablet(s) Oral <User Schedule>  enoxaparin Injectable 40 milliGRAM(s) SubCutaneous daily  ergocalciferol 47829 Unit(s) Oral every week  fat emulsion (Fish Oil and Plant Based) 20% Infusion 12.5 mL/Hr IV Continuous <Continuous>  loperamide 16 milliGRAM(s) Oral <User Schedule>  octreotide  Injectable 100 MICROGram(s) SubCutaneous three times a day  opium Tincture 6 milliGRAM(s) Oral <User Schedule>  pantoprazole    Tablet 40 milliGRAM(s) Oral before breakfast  Parenteral Nutrition - Adult 1 Each TPN Continuous <Continuous>  psyllium Powder 1 Packet(s) Oral three times a day  tamsulosin 0.4 milliGRAM(s) Oral daily      PRN INPATIENT MEDICATION  acetaminophen   Tablet .. 975 milliGRAM(s) Oral every 6 hours PRN  diphenhydramine 2%/zinc acetate 0.1% Cream 1 Application(s) Topical two times a day PRN  ondansetron Injectable 4 milliGRAM(s) IV Push every 6 hours PRN        VITALS/PHYSICAL EXAM  --------------------------------------------------------------------------------  T(C): 37.2 (01-29-20 @ 05:00), Max: 37.5 (01-28-20 @ 19:00)  HR: 87 (01-29-20 @ 09:00) (70 - 99)  BP: 127/61 (01-29-20 @ 09:00) (96/53 - 155/73)  RR: 25 (01-29-20 @ 09:00) (18 - 31)  SpO2: 94% (01-29-20 @ 09:00) (94% - 100%)  Wt(kg): --        01-28-20 @ 07:01  -  01-29-20 @ 07:00  --------------------------------------------------------  IN: 4882.5 mL / OUT: 4700 mL / NET: 182.5 mL    01-29-20 @ 07:01  -  01-29-20 @ 10:03  --------------------------------------------------------  IN: 67 mL / OUT: 200 mL / NET: -133 mL    PHYSICAL EXAM:  --------------------------------------------------------------------------------  	Gen: guarded but stable, A&Ox3, NC O2  	HEENT: NC/AT, PERRL, supple neck, trachea midline, mucosa moist              Chest: CTA; Lt chest w/ dressing c/d/i  	GI: (+) BS, softly distended, non tender                    midline incision c/d/i w/o drainage                   (+)ostomy pink viable- thick loose BM              MSK: FROM x4, no contractures nor deformities  	Vascular: Equally Warm, (+)BLE mild edema improving,  no clubbing, cyanosis,                       BUE w/o edema, clubbing & cyanosis                       RUE PICC w/o sx infection   	Neuro: No focal deficits, intact sensation, weakened strength BLE>BUE  	Psych: Normal affect and mood        LABS/ CULTURES/ RADIOLOGY:              8.3    8.61  >-----------<  287      [01-29-20 @ 00:52]              26.1     133  |  95  |  21  ----------------------------<  109      [01-29-20 @ 00:52]  4.4   |  31  |  0.41        Ca     8.2     [01-29-20 @ 00:52]      iCa    1.12     [01-29 @ 00:52]      Mg     1.8     [01-29-20 @ 00:52]      Phos  3.0     [01-29-20 @ 00:52]    TPro  6.2  /  Alb  2.6  /  TBili  0.5  /  DBili  0.3  /  AST  30  /  ALT  42  /  AlkPhos  86  [01-29-20 @ 00:52]      Blood Gas Calcium, Ionized - Venous: 1.18 mmoL/L (01-29-20 @ 01:05)  Blood Gas Calcium, Ionized - Venous: 1.19 mmoL/L (01-28-20 @ 00:26)      Prealbumin, Serum: 19 mg/dL (01-27-20 @ 04:52)  Prealbumin, Serum: 16 mg/dL (01-24-20 @ 02:53)  Prealbumin, Serum: 13 mg/dL (01-21-20 @ 02:00)  Prealbumin, Serum: 15 mg/dL (01-15-20 @ 09:01)  Prealbumin, Serum: 13 mg/dL (01-14-20 @ 07:29)      Triglycerides, Serum: 83 mg/dL (01.27.20 @ 00:14)  Triglycerides, Serum: 78 mg/dL (01.24.20 @ 00:18)  Triglycerides, Serum: 76 mg/dL (01.21.20 @ 00:26)  Triglycerides, Serum: 51 mg/dL (01.15.20 @ 01:44)  Triglycerides, Serum: 60 mg/dL (01.14.20 @ 03:31) [facility] NUTRITION SUPPORT / TPN -- FOLLOW UP NOTE  --------------------------------------------------------------------------------    24 hour events/subjective:  Pt w/o c/o N/V     tolerating diet  Tired today- didn't sleep well     was OOB ambulating yesterday  Pt w/o c/o pain     pt feels less swollen  No leukocytosis  Acth stim test pending   increased Urine Output=  DDAVP given- good response        Urine Output 1800mL  No cough/ cp/ palp/dyspnea/ sob  No f/c/s  no N/V  Increased ostomy output w/ [id number] over 24 hours       continue lomotil, imodium, octreotide, tincture of opium at max doses        Diet:  Diet, Mechanical Soft:   No Carb Prosource (1pkg = 15gms Protein)     Qty per Day:  1  Supplement Feeding Modality:  Oral  Ensure Enlive Cans or Servings Per Day:  2       Frequency:  Daily (01-24-20 @ 11:32)      Appetite: [  ]Poor [  ]Adequate [x  ]Good  Caloric intake:  [ x  ]  Adequate   [   ] Inadequate    ROS: General/ GI see HPI  all other systems negative      ALLERGIES & MEDICATIONS  --------------------------------------------------------------------------------  ALLERGIES  IV Contrast (Hives)    STANDING INPATIENT MEDICATIONS    albuterol/ipratropium for Nebulization. 3 milliLiter(s) Nebulizer every 6 hours  buDESOnide    Inhalation Suspension 0.5 milliGRAM(s) Inhalation every 12 hours  chlorhexidine 2% Cloths 1 Application(s) Topical <User Schedule>  diphenoxylate/atropine 2 Tablet(s) Oral <User Schedule>  enoxaparin Injectable 40 milliGRAM(s) SubCutaneous daily  ergocalciferol 01113 Unit(s) Oral every week  fat emulsion (Fish Oil and Plant Based) 20% Infusion 12.5 mL/Hr IV Continuous <Continuous>  loperamide 16 milliGRAM(s) Oral <User Schedule>  octreotide  Injectable 100 MICROGram(s) SubCutaneous three times a day  opium Tincture 6 milliGRAM(s) Oral <User Schedule>  pantoprazole    Tablet 40 milliGRAM(s) Oral before breakfast  Parenteral Nutrition - Adult 1 Each TPN Continuous <Continuous>  psyllium Powder 1 Packet(s) Oral three times a day  tamsulosin 0.4 milliGRAM(s) Oral daily      PRN INPATIENT MEDICATION  acetaminophen   Tablet .. 975 milliGRAM(s) Oral every 6 hours PRN  diphenhydramine 2%/zinc acetate 0.1% Cream 1 Application(s) Topical two times a day PRN  ondansetron Injectable 4 milliGRAM(s) IV Push every 6 hours PRN        VITALS/PHYSICAL EXAM  --------------------------------------------------------------------------------  T(C): 37.2 (01-29-20 @ 05:00), Max: 37.5 (01-28-20 @ 19:00)  HR: 87 (01-29-20 @ 09:00) (70 - 99)  BP: 127/61 (01-29-20 @ 09:00) (96/53 - 155/73)  RR: 25 (01-29-20 @ 09:00) (18 - 31)  SpO2: 94% (01-29-20 @ 09:00) (94% - 100%)  Wt(kg): --        01-28-20 @ 07:01  -  01-29-20 @ 07:00  --------------------------------------------------------  IN: 4882.5 mL / OUT: 4700 mL / NET: 182.5 mL    01-29-20 @ 07:01  -  01-29-20 @ 10:03  --------------------------------------------------------  IN: 67 mL / OUT: 200 mL / NET: -133 mL    PHYSICAL EXAM:  --------------------------------------------------------------------------------  	Gen: guarded but stable, A&Ox3, NC O2  	HEENT: NC/AT, PERRL, supple neck, trachea midline, mucosa moist              Chest: CTA; Lt chest w/ dressing c/d/i  	GI: (+) BS, softly distended, non tender                    midline incision c/d/i w/o drainage                   (+)ostomy pink viable- thick loose BM              MSK: FROM x4, no contractures nor deformities  	Vascular: Equally Warm, (+)BLE mild edema improving,  no clubbing, cyanosis,                       BUE w/o edema, clubbing & cyanosis                       RUE PICC w/o sx infection   	Neuro: No focal deficits, intact sensation, weakened strength BLE>BUE  	Psych: Normal affect and mood        LABS/ CULTURES/ RADIOLOGY:              8.3    8.61  >-----------<  287      [01-29-20 @ 00:52]              26.1     133  |  95  |  21  ----------------------------<  109      [01-29-20 @ 00:52]  4.4   |  31  |  0.41        Ca     8.2     [01-29-20 @ 00:52]      iCa    1.12     [01-29 @ 00:52]      Mg     1.8     [01-29-20 @ 00:52]      Phos  3.0     [01-29-20 @ 00:52]    TPro  6.2  /  Alb  2.6  /  TBili  0.5  /  DBili  0.3  /  AST  30  /  ALT  42  /  AlkPhos  86  [01-29-20 @ 00:52]      Blood Gas Calcium, Ionized - Venous: 1.18 mmoL/L (01-29-20 @ 01:05)  Blood Gas Calcium, Ionized - Venous: 1.19 mmoL/L (01-28-20 @ 00:26)      Prealbumin, Serum: 19 mg/dL (01-27-20 @ 04:52)  Prealbumin, Serum: 16 mg/dL (01-24-20 @ 02:53)  Prealbumin, Serum: 13 mg/dL (01-21-20 @ 02:00)  Prealbumin, Serum: 15 mg/dL (01-15-20 @ 09:01)  Prealbumin, Serum: 13 mg/dL (01-14-20 @ 07:29)      Triglycerides, Serum: 83 mg/dL (01.27.20 @ 00:14)  Triglycerides, Serum: 78 mg/dL (01.24.20 @ 00:18)  Triglycerides, Serum: 76 mg/dL (01.21.20 @ 00:26)  Triglycerides, Serum: 51 mg/dL (01.15.20 @ 01:44)  Triglycerides, Serum: 60 mg/dL (01.14.20 @ 03:31)

## 2020-01-29 NOTE — PROGRESS NOTE ADULT - ASSESSMENT
76 y/o M presenting with septic shock and high grade SBO s/p exploratory laparotomy, lysis of adhesions, decompression of bowel via enterotomy w/ primary repair, and Abthera VAC placement on 12/6; s/p take down of Abthera, washout and re-application of the Abthera vac on 12/8. 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 s/p L chest tube placement by IR 12/30 for pleural effusion now s/p VATS, with chest tube insertion for drainage of pleural effusion. RRT called 1/13 AM for hypoxia, patient transferred back to SICU.  Patient continued SOB, chest drain possible obstruction expanding. Patent taken back to OR emergently 1/15 for clot evac and VATS.     PLAN:  - Continue diet as tolerated  - Monitor Ileostomy output  - OOB to chair  - Continue Lomotil, loperamide, tincture of opium, octreotide  - Appreciate continued SICU care    [facility]  [id number]

## 2020-01-29 NOTE — PROGRESS NOTE ADULT - SUBJECTIVE AND OBJECTIVE BOX
Subjective: Patient seen and examined. No new events except as noted.   Remains in ICU   24 HOUR EVENTS:  - 1L plasmalyte given in the AM for repletions  - DDAVP dosed  REVIEW OF SYSTEMS:    CONSTITUTIONAL: +weakness, fevers or chills  EYES/ENT: No visual changes;  No vertigo or throat pain   NECK: No pain or stiffness  RESPIRATORY: No cough, wheezing, hemoptysis; No shortness of breath  CARDIOVASCULAR: No chest pain or palpitations  GASTROINTESTINAL: No abdominal or epigastric pain. No nausea, vomiting, or hematemesis; No diarrhea or constipation. No melena or hematochezia.  GENITOURINARY: No dysuria, frequency or hematuria  NEUROLOGICAL: No numbness or weakness  SKIN: No itching, burning, rashes, or lesions   All other review of systems is negative unless indicated above.    MEDICATIONS:  MEDICATIONS  (STANDING):  albuterol/ipratropium for Nebulization. 3 milliLiter(s) Nebulizer every 6 hours  buDESOnide    Inhalation Suspension 0.5 milliGRAM(s) Inhalation every 12 hours  chlorhexidine 2% Cloths 1 Application(s) Topical <User Schedule>  diphenoxylate/atropine 2 Tablet(s) Oral <User Schedule>  enoxaparin Injectable 40 milliGRAM(s) SubCutaneous daily  ergocalciferol 32824 Unit(s) Oral every week  fat emulsion (Fish Oil and Plant Based) 20% Infusion 12.5 mL/Hr (12.5 mL/Hr) IV Continuous <Continuous>  loperamide 16 milliGRAM(s) Oral <User Schedule>  octreotide  Injectable 100 MICROGram(s) SubCutaneous three times a day  opium Tincture 6 milliGRAM(s) Oral <User Schedule>  pantoprazole    Tablet 40 milliGRAM(s) Oral before breakfast  Parenteral Nutrition - Adult 1 Each (67 mL/Hr) TPN Continuous <Continuous>  psyllium Powder 1 Packet(s) Oral three times a day  tamsulosin 0.4 milliGRAM(s) Oral daily      PHYSICAL EXAM:  T(C): 36.7 (01-29-20 @ 19:00), Max: 37.2 (01-28-20 @ 23:00)  HR: 87 (01-29-20 @ 19:00) (70 - 95)  BP: 109/55 (01-29-20 @ 19:00) (91/52 - 155/73)  RR: 26 (01-29-20 @ 19:00) (18 - 41)  SpO2: 98% (01-29-20 @ 19:00) (93% - 100%)  Wt(kg): --  I&O's Summary    28 Jan 2020 07:01  -  29 Jan 2020 07:00  --------------------------------------------------------  IN: 4882.5 mL / OUT: 4700 mL / NET: 182.5 mL    29 Jan 2020 07:01  -  29 Jan 2020 20:09  --------------------------------------------------------  IN: 1601.5 mL / OUT: 1460 mL / NET: 141.5 mL          Appearance: NAD	  HEENT:   Normal oral mucosa, PERRL, EOMI	  Lymphatic: No lymphadenopathy , no edema  Cardiovascular: Normal S1 S2, No JVD, No murmurs , Peripheral pulses palpable 2+ bilaterally  Respiratory: Lungs clear to auscultation, normal effort 	  Gastrointestinal:  Soft, Non-tender, + Ileostomy   Skin: No rashes, No ecchymoses, No cyanosis, warm to touch  Musculoskeletal: Normal range of motion, normal strength  Psychiatry:  Mood & affect appropriate  Ext: No edema  +rosas     LABS:    CARDIAC MARKERS:                                8.3    8.61  )-----------( 287      ( 29 Jan 2020 00:52 )             26.1     01-29    133<L>  |  95<L>  |  21  ----------------------------<  109<H>  4.4   |  31  |  0.41<L>    Ca    8.2<L>      29 Jan 2020 00:52  Phos  3.0     01-29  Mg     1.8     01-29    TPro  6.2  /  Alb  2.6<L>  /  TBili  0.5  /  DBili  0.3<H>  /  AST  30  /  ALT  42  /  AlkPhos  86  01-29    proBNP:   Lipid Profile:   HgA1c:   TSH:             TELEMETRY: 	SR    ECG:  	  RADIOLOGY:   DIAGNOSTIC TESTING:  [ ] Echocardiogram:  [ ]  Catheterization:  [ ] Stress Test:    OTHER:

## 2020-01-29 NOTE — PROGRESS NOTE ADULT - SUBJECTIVE AND OBJECTIVE BOX
SICU DAILY PROGRESS NOTE    75y Male past medical history of COPD and EtOH dependence who presented on 12/6/2019 with abdominal pain, nausea, hematemesis, and poor PO intake for ~2-3 days. In the ED, he was found to be hypotensive & tachycardic. Labs revealed an CHI and lactic acidosis. Imaging revealed a high grade SBO in the RLQ on CT scan. Hospital course is as follows:  12/06 - s/p exploratory laparotomy, lysis of adhesions, decompression of bowel via enterotomy w/ primary repair, and Abthera VAC placement as the distal 50% of the bowel appeared dusky but was still viable, admitted to SICU post-operatively as he was on vasopressor support and was left intubated  12/08 - s/p re-exploration, proximal 165 cm of small bowel appeared pink & viable but beyond that had patchy areas of ischemia so decision was made to give the bowel more time to demarcate before resecting in order to preserve as much small bowel as possible so Abthera VAC was replaced  12/09 - s/p re-exploration, small bowel resection of 150 cm (150 cm remaining), ileocecetomy, end ileostomy, mucous fistula, and abdominal closure  12/11 - extubated to BiPAP, noted to be in SVT that was rate controlled w/ metoprolol  12/14 - started on TPN  12/15 - noted to have spontaneous left pneumothorax s/p left pigtail catheter placement, noted to be in SVT again so amiodarone started, high ileostomy output noted so concern for short bowel syndrome  12/16 - amiodarone discontinued, started on beta blocker with metoprolol  12/18 - started Lomotil and ileostomy repletions with IV fluids  12/19 - started Imodium, left pigtail catheter was placed to water seal but pneumothorax noted on follow-up CXR so placed back to suction  12/20 - started tincture of opium, concern for aspiration so changed diet from regular to dysphagia 1 pureed with nectar-thickened liquids  12/22 - left pigtail catheter placed to water seal with no pneumothorax noted on follow-up CXR, CT chest with moderate left pleural effusion not being drained by pigtail catheter  12/23 - 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  12/24 - left pigtail catheter discontinued  12/28 - started Ancef for erythematous midline wound  12/29 - beta blocker discontinued for intermittent episodes of hypotension  12/30 - left pigtail catheter placement by IR with drainage of serous transudative fluid  01/02 - FEES demonstrating penetration with thin liquids so patient kept on dysphagia 1 pureed with nectar-thickened liquids  01/07 - Transferred to floors  01/10 - s/p left VATS w/ PleurX catheter placement  01/11 - evaluated by speech & swallow, advanced diet to mechanical soft with thin liquids  01/12 - PleurX catheter placed to water seal with on pneumothorax on follow-up CXR  01/13 - RRT for hypoxemia, placed on BiPAP, PleurX catheter placed back to suction, started vancomycin & Zosyn for possible HCAP  01/14 - noted to have minimal output from PleurX catheter, lung ultrasound with large left pleural effusion concerning for hemothorax, multiple episodes of bradycardia secondary to hypoxemia, remains on BiPAP  01/15 - worsening respiratory distress requiring intubation, hypotensive requiring vasopressor support, CT chest with large left hemothorax w/ trace pneumothorax & extensive subcutaneous emphysema so taken to OR emergently for left VATS, evacuation fo 2 L of clot, and placement of two left chest tubes  01/16 - extubated, weaned off vasopressor support  01/17 - two left chest tubes placed to water seal with no pneumothorax noted on follow-up CXR    24 HOUR EVENTS:  - 1L plasmalyte given in the AM for repletions  - DDAVP dosed      SUBJECTIVE/ROS:  [x] A ten-point review of systems was otherwise negative except as noted.  [ ] Due to altered mental status/intubation, subjective information were not able to be obtained from the patient. History was obtained, to the extent possible, from review of the chart and collateral sources of information.      NEURO  Exam: awake, alert, oriented  Meds: acetaminophen   Tablet .. 975 milliGRAM(s) Oral every 6 hours PRN Mild Pain (1 - 3)  ondansetron Injectable 4 milliGRAM(s) IV Push every 6 hours PRN Nausea and/or Vomiting  opium Tincture 6 milliGRAM(s) Oral <User Schedule>    [x] Adequacy of sedation and pain control has been assessed and adjusted      RESPIRATORY  RR: 22 (01-29-20 @ 02:00) (13 - 27)  SpO2: 97% (01-29-20 @ 02:00) (93% - 100%)  Wt(kg): --  Exam: unlabored, clear to auscultation bilaterally  Mechanical Ventilation:     [N/A] Extubation Readiness Assessed  Meds: albuterol/ipratropium for Nebulization. 3 milliLiter(s) Nebulizer every 6 hours  buDESOnide    Inhalation Suspension 0.5 milliGRAM(s) Inhalation every 12 hours        CARDIOVASCULAR  HR: 80 (01-29-20 @ 02:00) (67 - 99)  BP: 121/60 (01-29-20 @ 02:00) (98/54 - 155/73)  BP(mean): 87 (01-29-20 @ 02:00) (69 - 104)  ABP: --  ABP(mean): --  Wt(kg): --  CVP(cm H2O): --  VBG - ( 29 Jan 2020 01:05 )  pH: 7.37  /  pCO2: 65    /  pO2: 45    / HCO3: 37    / Base Excess: 10.4  /  SaO2: 78     Lactate: 0.6                Exam: regular rate and rhythm  Cardiac Rhythm: sinus  Perfusion     [x]Adequate   [ ]Inadequate  Mentation   [x]Normal       [ ]Reduced  Extremities  [x]Warm         [ ]Cool  Volume Status [ ]Hypervolemic [x]Euvolemic [ ]Hypovolemic  Meds: tamsulosin 0.4 milliGRAM(s) Oral daily        GI/NUTRITION  Exam: soft, nontender, nondistended, incision C/D/I  Diet: regular  Meds: diphenoxylate/atropine 2 Tablet(s) Oral <User Schedule>  loperamide 16 milliGRAM(s) Oral <User Schedule>  pantoprazole    Tablet 40 milliGRAM(s) Oral before breakfast  psyllium Powder 1 Packet(s) Oral three times a day      GENITOURINARY  I&O's Detail    01-27 @ 07:01  -  01-28 @ 07:00  --------------------------------------------------------  IN:    fat emulsion (Fish Oil and Plant Based) 20% Infusion: 162.5 mL    Oral Fluid: 1400 mL    Solution: 50 mL    TPN (Total Parenteral Nutrition): 1200 mL  Total IN: 2812.5 mL    OUT:    Ileostomy: 2000 mL    Voided: 2200 mL  Total OUT: 4200 mL    Total NET: -1387.5 mL      01-28 @ 07:01  -  01-29 @ 03:23  --------------------------------------------------------  IN:    fat emulsion (Fish Oil and Plant Based) 20% Infusion: 137.5 mL    multiple electrolytes Injection Type 1 Bolus: 1000 mL    Oral Fluid: 1710 mL    TPN (Total Parenteral Nutrition): 1187 mL  Total IN: 4034.5 mL    OUT:    Ileostomy: 2600 mL    Voided: 1500 mL  Total OUT: 4100 mL    Total NET: -65.5 mL          01-29    133<L>  |  95<L>  |  21  ----------------------------<  109<H>  4.4   |  31  |  0.41<L>    Ca    8.2<L>      29 Jan 2020 00:52  Phos  3.0     01-29  Mg     1.8     01-29    TPro  6.2  /  Alb  2.6<L>  /  TBili  0.5  /  DBili  0.3<H>  /  AST  30  /  ALT  42  /  AlkPhos  86  01-29    [ ] Martinez catheter, indication: N/A  Meds: ergocalciferol 51575 Unit(s) Oral every week  fat emulsion (Fish Oil and Plant Based) 20% Infusion 12.5 mL/Hr IV Continuous <Continuous>  Parenteral Nutrition - Adult 1 Each TPN Continuous <Continuous>  potassium phosphate / sodium phosphate powder 2 Packet(s) Oral once        HEMATOLOGIC  Meds: enoxaparin Injectable 40 milliGRAM(s) SubCutaneous daily    [x] VTE Prophylaxis                        8.3    8.61  )-----------( 287      ( 29 Jan 2020 00:52 )             26.1       Transfusion     [ ] PRBC   [ ] Platelets   [ ] FFP   [ ] Cryoprecipitate      INFECTIOUS DISEASES  WBC Count: 8.61 K/uL (01-29 @ 00:52)    RECENT CULTURES:  Specimen Source: .Blood Blood  Date/Time: 01-26 @ 03:52  Culture Results:   No growth to date.  Gram Stain: --  Organism: --    Meds:       ENDOCRINE  CAPILLARY BLOOD GLUCOSE        Meds: octreotide  Injectable 100 MICROGram(s) SubCutaneous three times a day        ACCESS DEVICES:  [x] Peripheral IV  [ ] Central Venous Line	[ ] R	[ ] L	[ ] IJ	[ ] Fem	[ ] SC	Placed:   [ ] Arterial Line		[ ] R	[ ] L	[ ] Fem	[ ] Rad	[ ] Ax	Placed:   [ ] PICC:					[ ] Mediport  [ ] Urinary Catheter, Date Placed:   [x] Necessity of urinary, arterial, and venous catheters discussed    OTHER MEDICATIONS:  chlorhexidine 2% Cloths 1 Application(s) Topical <User Schedule>  diphenhydramine 2%/zinc acetate 0.1% Cream 1 Application(s) Topical two times a day PRN      CODE STATUS: full

## 2020-01-29 NOTE — PROGRESS NOTE ADULT - ASSESSMENT
A/P: 75 year old male w/ PMH of COPD and EtOH dependence with PSH/o appy, prostate surgery, & spine surgery  septic shock and high grade SBO s/p exploratory laparotomy, lysis of adhesions, decompression of bowel via enterotomy w/ primary repair, and Abthera VAC placement on 12/6; s/p take down of Abthera, washout and re-application of the Abthera vac on 12/8. Now s/p SBR and end ileostomy on 12/10.   TPN consulted to assist w/ management of pt's nutrition in pt w/ prolonged hospital course now tolerating diet but has high Ileostomy output.  Pt s/p Lt Chest Pigtail for effusion in IR with persistent high output on 1/10/2020 pt had VATs & placement of Left PleurX catheter. Pt op pt developed hemothorax and Respiratory Distress.  Pt is s/p Lt chest Evacuation of 2L blood w/ placement of CT x2 on 1/15/2020 for Lt Pleural Effusion that's resolving and doing well after CT removed and Improving Rt PNA vs Pleural Effusion.     Pt remains in SICU w/ High Ostomy Output, and improving Hypotension w/o evidence of Sepsis.   Plan to Reverse Ileostomy in February, possibly next week  Pt w/ improving severe Protein-Calorie Malnutrition-        Short Gut Syndrome w/Malabsorption        TPN doing well at 1/2 Goal- pt only able to eat approximately 1/2 of nutritional goals            Amino Acids 60g, Dextrose 140g, and Lipids 30g in 1600mL with 3mL MTE & 10mL MVI        Pt tolerating Soft Mechanical w/ Ensure & Prosource supplements             -Concern for adrenal insufficiency (1/26 AM cortisol = 5.9)        ACTH Stim test pending  - HypoCa- increased Ca in TPN to 14mEq- continue to monitor        Improved Ca levels helpful for BP & muscle contraction  -HypoPhos- Phos level decreasing -increase NaPhos to 60mMol from 45mMol in TPN & will continue to monitor   -Fecal fat testing suggestive of decreased absorption of fat -         TPN w/ MVI to compensate for low Vit E & A        Vit D levels low- Ergocalciferol supplements started        Vit K INR/ PT near normal suggestive that it is being absor  -Edema greatly improved and Fluid overload resolved          w/ recent h/o HypoTN responded well to IVF           Increased total vol TPN to 1600 from 1200mL           Increased osm with NaPhos 60mMol and NaCl 40mEq to TPN  -Increased Urine Output- DDAVP given yesterday w/ good response          Na wnl- low probability of DI  -Strict Intake and Output -            Increased ostomy output- increased TPN volume, continue to monitor           Continue to monitor while on octreotide, lomotil, tincture of opium, & imodium  -Leukocytosis- resolved- observing off abx  -Hyperglycemia-improving      Fingersticks & ISS coverage - as per SICU  -To continue monitoring of nutrition - Weights three times a week; Daily CMP, Mg, Ionized Ca, Phosphorus,        and Weekly Triglycerides and Pre-albumin  Continue as per SICU/Surgery, will follow with you, D/w primary team    Andreina Hubbard PA-C  TPN team, pager [phone]  D/w Ana M Chacko & MU Siegel

## 2020-01-30 LAB
ALBUMIN SERPL ELPH-MCNC: 2.7 G/DL — LOW (ref 3.3–5)
ALP SERPL-CCNC: 94 U/L — SIGNIFICANT CHANGE UP (ref 40–120)
ALT FLD-CCNC: 37 U/L — SIGNIFICANT CHANGE UP (ref 10–45)
ANION GAP SERPL CALC-SCNC: 7 MMOL/L — SIGNIFICANT CHANGE UP (ref 5–17)
AST SERPL-CCNC: 22 U/L — SIGNIFICANT CHANGE UP (ref 10–40)
BILIRUB DIRECT SERPL-MCNC: 0.3 MG/DL — HIGH (ref 0–0.2)
BILIRUB INDIRECT FLD-MCNC: 0.4 MG/DL — SIGNIFICANT CHANGE UP (ref 0.2–1)
BILIRUB SERPL-MCNC: 0.7 MG/DL — SIGNIFICANT CHANGE UP (ref 0.2–1.2)
BUN SERPL-MCNC: 19 MG/DL — SIGNIFICANT CHANGE UP (ref 7–23)
CA-I BLD-SCNC: 1.16 MMOL/L — SIGNIFICANT CHANGE UP (ref 1.12–1.3)
CALCIUM SERPL-MCNC: 8.4 MG/DL — SIGNIFICANT CHANGE UP (ref 8.4–10.5)
CHLORIDE SERPL-SCNC: 96 MMOL/L — SIGNIFICANT CHANGE UP (ref 96–108)
CO2 SERPL-SCNC: 32 MMOL/L — HIGH (ref 22–31)
CREAT SERPL-MCNC: 0.45 MG/DL — LOW (ref 0.5–1.3)
GLUCOSE SERPL-MCNC: 110 MG/DL — HIGH (ref 70–99)
HCT VFR BLD CALC: 26.7 % — LOW (ref 39–50)
HGB BLD-MCNC: 8.2 G/DL — LOW (ref 13–17)
MAGNESIUM SERPL-MCNC: 1.8 MG/DL — SIGNIFICANT CHANGE UP (ref 1.6–2.6)
MCHC RBC-ENTMCNC: 30.1 PG — SIGNIFICANT CHANGE UP (ref 27–34)
MCHC RBC-ENTMCNC: 30.7 GM/DL — LOW (ref 32–36)
MCV RBC AUTO: 98.2 FL — SIGNIFICANT CHANGE UP (ref 80–100)
NRBC # BLD: 0 /100 WBCS — SIGNIFICANT CHANGE UP (ref 0–0)
PHOSPHATE SERPL-MCNC: 3.7 MG/DL — SIGNIFICANT CHANGE UP (ref 2.5–4.5)
PLATELET # BLD AUTO: 307 K/UL — SIGNIFICANT CHANGE UP (ref 150–400)
POTASSIUM SERPL-MCNC: 4.5 MMOL/L — SIGNIFICANT CHANGE UP (ref 3.5–5.3)
POTASSIUM SERPL-SCNC: 4.5 MMOL/L — SIGNIFICANT CHANGE UP (ref 3.5–5.3)
PROT SERPL-MCNC: 6.6 G/DL — SIGNIFICANT CHANGE UP (ref 6–8.3)
RBC # BLD: 2.72 M/UL — LOW (ref 4.2–5.8)
RBC # FLD: 14.2 % — SIGNIFICANT CHANGE UP (ref 10.3–14.5)
SODIUM SERPL-SCNC: 135 MMOL/L — SIGNIFICANT CHANGE UP (ref 135–145)
WBC # BLD: 10.31 K/UL — SIGNIFICANT CHANGE UP (ref 3.8–10.5)
WBC # FLD AUTO: 10.31 K/UL — SIGNIFICANT CHANGE UP (ref 3.8–10.5)

## 2020-01-30 PROCEDURE — 99233 SBSQ HOSP IP/OBS HIGH 50: CPT

## 2020-01-30 PROCEDURE — 99232 SBSQ HOSP IP/OBS MODERATE 35: CPT

## 2020-01-30 RX ORDER — I.V. FAT EMULSION 20 G/100ML
12.5 EMULSION INTRAVENOUS
Qty: 30 | Refills: 0 | Status: DISCONTINUED | OUTPATIENT
Start: 2020-01-30 | End: 2020-01-31

## 2020-01-30 RX ORDER — SODIUM,POTASSIUM PHOSPHATES 278-250MG
2 POWDER IN PACKET (EA) ORAL ONCE
Refills: 0 | Status: COMPLETED | OUTPATIENT
Start: 2020-01-30 | End: 2020-01-30

## 2020-01-30 RX ORDER — SODIUM CHLORIDE 9 MG/ML
1000 INJECTION INTRAMUSCULAR; INTRAVENOUS; SUBCUTANEOUS ONCE
Refills: 0 | Status: COMPLETED | OUTPATIENT
Start: 2020-01-30 | End: 2020-01-30

## 2020-01-30 RX ORDER — MAGNESIUM SULFATE 500 MG/ML
2 VIAL (ML) INJECTION ONCE
Refills: 0 | Status: COMPLETED | OUTPATIENT
Start: 2020-01-30 | End: 2020-01-30

## 2020-01-30 RX ORDER — ELECTROLYTE SOLUTION,INJ
1 VIAL (ML) INTRAVENOUS
Refills: 0 | Status: DISCONTINUED | OUTPATIENT
Start: 2020-01-30 | End: 2020-01-30

## 2020-01-30 RX ADMIN — CHLORHEXIDINE GLUCONATE 1 APPLICATION(S): 213 SOLUTION TOPICAL at 06:48

## 2020-01-30 RX ADMIN — Medication 2 TABLET(S): at 23:06

## 2020-01-30 RX ADMIN — Medication 3 MILLILITER(S): at 17:27

## 2020-01-30 RX ADMIN — Medication 0.5 MILLIGRAM(S): at 17:28

## 2020-01-30 RX ADMIN — MORPHINE 6 MILLIGRAM(S): 10 SOLUTION ORAL at 12:57

## 2020-01-30 RX ADMIN — Medication 1 PACKET(S): at 13:03

## 2020-01-30 RX ADMIN — OCTREOTIDE ACETATE 100 MICROGRAM(S): 200 INJECTION, SOLUTION INTRAVENOUS; SUBCUTANEOUS at 23:11

## 2020-01-30 RX ADMIN — Medication 2 PACKET(S): at 06:48

## 2020-01-30 RX ADMIN — MORPHINE 6 MILLIGRAM(S): 10 SOLUTION ORAL at 23:07

## 2020-01-30 RX ADMIN — Medication 3 MILLILITER(S): at 00:22

## 2020-01-30 RX ADMIN — Medication 975 MILLIGRAM(S): at 23:08

## 2020-01-30 RX ADMIN — MORPHINE 6 MILLIGRAM(S): 10 SOLUTION ORAL at 19:32

## 2020-01-30 RX ADMIN — Medication 2 TABLET(S): at 17:21

## 2020-01-30 RX ADMIN — Medication 16 MILLIGRAM(S): at 08:16

## 2020-01-30 RX ADMIN — Medication 2 TABLET(S): at 13:00

## 2020-01-30 RX ADMIN — MORPHINE 6 MILLIGRAM(S): 10 SOLUTION ORAL at 08:15

## 2020-01-30 RX ADMIN — Medication 1 EACH: at 08:15

## 2020-01-30 RX ADMIN — Medication 16 MILLIGRAM(S): at 23:07

## 2020-01-30 RX ADMIN — PANTOPRAZOLE SODIUM 40 MILLIGRAM(S): 20 TABLET, DELAYED RELEASE ORAL at 08:15

## 2020-01-30 RX ADMIN — Medication 16 MILLIGRAM(S): at 17:22

## 2020-01-30 RX ADMIN — Medication 0.5 MILLIGRAM(S): at 06:38

## 2020-01-30 RX ADMIN — OCTREOTIDE ACETATE 100 MICROGRAM(S): 200 INJECTION, SOLUTION INTRAVENOUS; SUBCUTANEOUS at 13:03

## 2020-01-30 RX ADMIN — Medication 1 PACKET(S): at 06:47

## 2020-01-30 RX ADMIN — Medication 1 PACKET(S): at 23:08

## 2020-01-30 RX ADMIN — SODIUM CHLORIDE 6000 MILLILITER(S): 9 INJECTION INTRAMUSCULAR; INTRAVENOUS; SUBCUTANEOUS at 09:19

## 2020-01-30 RX ADMIN — Medication 16 MILLIGRAM(S): at 12:58

## 2020-01-30 RX ADMIN — I.V. FAT EMULSION 12.5 ML/HR: 20 EMULSION INTRAVENOUS at 17:18

## 2020-01-30 RX ADMIN — Medication 3 MILLILITER(S): at 23:38

## 2020-01-30 RX ADMIN — ENOXAPARIN SODIUM 40 MILLIGRAM(S): 100 INJECTION SUBCUTANEOUS at 11:40

## 2020-01-30 RX ADMIN — Medication 3 MILLILITER(S): at 11:47

## 2020-01-30 RX ADMIN — TAMSULOSIN HYDROCHLORIDE 0.4 MILLIGRAM(S): 0.4 CAPSULE ORAL at 11:40

## 2020-01-30 RX ADMIN — OCTREOTIDE ACETATE 100 MICROGRAM(S): 200 INJECTION, SOLUTION INTRAVENOUS; SUBCUTANEOUS at 06:47

## 2020-01-30 RX ADMIN — Medication 1 EACH: at 17:18

## 2020-01-30 RX ADMIN — Medication 2 TABLET(S): at 08:15

## 2020-01-30 RX ADMIN — Medication 50 GRAM(S): at 03:00

## 2020-01-30 RX ADMIN — Medication 3 MILLILITER(S): at 06:38

## 2020-01-30 NOTE — PROGRESS NOTE ADULT - ATTENDING COMMENTS
Awake and alert, comfortable  Saturating well on 2L, on his COPD meds  PO/TPN volume increased to match for high volume output. On high dose multiple anti prokinetic meds for high ileostomy output  One L bolus to match output  ACTH stimulation test result pending  PT/mobilization

## 2020-01-30 NOTE — PROGRESS NOTE ADULT - ASSESSMENT
75 y/o male presenting with high grade SBO s/p exploratory laparotomy, lysis of adhesions, decompression of bowel via enterotomy w/ primary repair, & Abthera VAC placement (12/06/2019); RTOR for re-exploration w/ Abthera VAC replacement (12/08/2019) to allow for demarcation of ischemic small bowel; RTOR for re-exploration, small bowel resection of 150 cm (150 cm remaining), ileocecetomy, end ileostomy, mucous fistula, and abdominal closure (12/10/2019); hospital course complicated by SVT, short bowel syndrome requiring repletions w/ IV fluids, malnutrition requiring TPN, intermittent episodes of hypotension, spontaneous left pneumothorax s/p pigtail catheter (12/15/2019-12/24/2019), left pleural effusion s/p pigtail catheter w/ IR (12/30/2019), dysphagia, and oral HSV lesions, placement of Pleurx on 1/10, now re-admitted to SICU with acute hypoxic respiratory failure s/p repeat VATS with 2 liter hemothorax removed. Respiratory failure resolved, now stable in SICU.    Neuro: no acute issues  - Pain control as needed with acetaminophen    Resp: COPD, spontaneous left pneumothorax s/p pigtail catheter, left pleural effusion s/p Pleurx 1/10.  Intubated for respiratory distress s/p VATS 01/15 with 2 Liter of blood evacuation s/p removal of chest tubes   - Pulmicort and Duoneb for COPD  - Will need outpatient follow-up with a pulmonologist for his COPD as patient does not currently have one    CV:   - No interval dysrhythmias     GI: s/p exploratory laparotomy, Grecia, decompression of bowel via enterotomy w/ primary repair, & Abthera VAC placement (12/06/2019); re-exploration w/ ABthera VAC replacement (12/08/2019); re-exploration, small bowel resection of 150 cm (150 cm remaining), ileocecectomy end ileostomy, mucous fistula, and abdominal closure (12/10/2019); short bowel syndrome, malnutrition, dysphagia  - Mechanical soft diet + TPN  - Monitor ostomy output  - Continue Lomotil, tincture of opium, loperamide, metamucil, and octreotide for short bowel syndrome  - Protonix to decrease gastric secretions  - Continue Vitamin D supplementation    Renal: polyuria 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 s/p DDAVP with decrease in urine output (last dose 1/28)  - Monitor I&Os and daily weights   - Monitor electrolytes and replete as necessary  - Flomax for urinary retention  - Daily assessment for DDAVP based on urine output  - Follow up Cosyntropin test, takes 3-5 days sent on 1/28    Heme: anemia likely secondary to malnutrition / malabsorption  - Monitor CBC and coags   - Lovenox for VTE prophylaxis    ID: oral HSV lesions (s/p acyclovir 12/23/2019-01/02/2020); completed course of meropenem for Zosyn-resistant Pseudomonas PNA   - Monitor for fever, culture if febrile    Endo: no acute issues  - Monitor glucose on BMPs   - F/u Cosyntropin test results      Disposition: Will remain in SICU. 75 y/o male presenting with high grade SBO s/p exploratory laparotomy, lysis of adhesions, decompression of bowel via enterotomy w/ primary repair, & Abthera VAC placement (12/06/2019); RTOR for re-exploration w/ Abthera VAC replacement (12/08/2019) to allow for demarcation of ischemic small bowel; RTOR for re-exploration, small bowel resection of 150 cm (150 cm remaining), ileocecetomy, end ileostomy, mucous fistula, and abdominal closure (12/10/2019); hospital course complicated by SVT, short bowel syndrome requiring repletions w/ IV fluids, malnutrition requiring TPN, intermittent episodes of hypotension, spontaneous left pneumothorax s/p pigtail catheter (12/15/2019-12/24/2019), left pleural effusion s/p pigtail catheter w/ IR (12/30/2019), dysphagia, and oral HSV lesions, placement of Pleurx on 1/10, now re-admitted to SICU with acute hypoxic respiratory failure s/p repeat VATS with 2 liter hemothorax removed. Respiratory failure resolved, now stable in SICU.    Neuro: no acute issues  - Pain control as needed with acetaminophen    Resp: COPD, spontaneous left pneumothorax s/p pigtail catheter, left pleural effusion s/p Pleurx 1/10.  Intubated for respiratory distress s/p VATS 01/15 with 2 Liter of blood evacuation s/p removal of chest tubes   - Pulmicort and Duoneb for COPD  - Will need outpatient follow-up with a pulmonologist for his COPD as patient does not currently have one    CV: no acute issues  - Monitor hemodynamics, I&Os    GI: s/p exploratory laparotomy, Grecia, decompression of bowel via enterotomy w/ primary repair, & Abthera VAC placement (12/06/2019); re-exploration w/ ABthera VAC replacement (12/08/2019); re-exploration, small bowel resection of 150 cm (150 cm remaining), ileocecectomy end ileostomy, mucous fistula, and abdominal closure (12/10/2019); short bowel syndrome, malnutrition, dysphagia  - Mechanical soft diet + TPN  - Monitor ostomy output  - Continue Lomotil, tincture of opium, loperamide, metamucil, and octreotide for short bowel syndrome  - Protonix to decrease gastric secretions  - Continue Vitamin D supplementation    Renal: polyuria 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 s/p DDAVP with decrease in urine output (last dose 1/28); possible adrenal insufficiency  - Monitor I&Os and daily weights   - Monitor electrolytes and replete as necessary  - Flomax for urinary retention  - Daily assessment for DDAVP based on urine output  - Follow up Cosyntropin test, takes 3-5 days sent on 1/28    Heme: anemia likely secondary to malnutrition / malabsorption  - Monitor CBC and coags   - Lovenox for VTE prophylaxis    ID: oral HSV lesions (s/p acyclovir 12/23/2019-01/02/2020); completed course of meropenem for Zosyn-resistant Pseudomonas PNA   - Monitor for fever, culture if febrile    Endo: no acute issues  - Monitor glucose on BMPs   - F/u Cosyntropin test results      Disposition: Will remain in SICU. 73 y/o male presenting with high grade SBO s/p exploratory laparotomy, lysis of adhesions, decompression of bowel via enterotomy w/ primary repair, & Abthera VAC placement (12/06/2019); RTOR for re-exploration w/ Abthera VAC replacement (12/08/2019) to allow for demarcation of ischemic small bowel; RTOR for re-exploration, small bowel resection of 150 cm (150 cm remaining), ileocecetomy, end ileostomy, mucous fistula, and abdominal closure (12/10/2019); hospital course complicated by SVT, short bowel syndrome requiring repletions w/ IV fluids, malnutrition requiring TPN, intermittent episodes of hypotension, spontaneous left pneumothorax s/p pigtail catheter (12/15/2019-12/24/2019), left pleural effusion s/p pigtail catheter w/ IR (12/30/2019), dysphagia, and oral HSV lesions, placement of Pleurx on 1/10, now re-admitted to SICU with acute hypoxic respiratory failure s/p repeat VATS with 2 liter hemothorax removed. Respiratory failure resolved, now stable in SICU.    Neuro: no acute issues  - Pain control as needed with acetaminophen    Resp: COPD, spontaneous left pneumothorax s/p pigtail catheter, left pleural effusion s/p Pleurx 1/10.  Intubated for respiratory distress s/p VATS 01/15 with 2 Liter of blood evacuation s/p removal of chest tubes   - Pulmicort and Duoneb for COPD  - Will need outpatient follow-up with a pulmonologist for his COPD as patient does not currently have one    CV: no acute issues  - Monitor hemodynamics, I&Os    GI: s/p exploratory laparotomy, Grecia, decompression of bowel via enterotomy w/ primary repair, & Abthera VAC placement (12/06/2019); re-exploration w/ ABthera VAC replacement (12/08/2019); re-exploration, small bowel resection of 150 cm (150 cm remaining), ileocecectomy end ileostomy, mucous fistula, and abdominal closure (12/10/2019); short bowel syndrome, malnutrition, dysphagia  - Mechanical soft diet + TPN  - Monitor ostomy output  - Continue Lomotil, tincture of opium, loperamide, metamucil, and octreotide for short bowel syndrome  - Protonix to decrease gastric secretions  - Continue Vitamin D supplementation    Renal: polyuria 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 s/p DDAVP with decrease in urine output (last dose 1/28)  - Monitor I&Os and daily weights   - Monitor electrolytes and replete as necessary  - Flomax for urinary retention  - Daily assessment for DDAVP based on urine output      Heme: anemia likely secondary to malnutrition / malabsorption  - Monitor CBC and coags   - Lovenox for VTE prophylaxis    ID: oral HSV lesions (s/p acyclovir 12/23/2019-01/02/2020); completed course of meropenem for Zosyn-resistant Pseudomonas PNA   - Monitor for fever, culture if febrile    Endo: possible adrenal insufficiency  - Monitor glucose on BMPs   - Follow up Cosyntropin test, takes 3-5 days sent on 1/28      Disposition: Will remain in SICU. 75 y/o male presenting with high grade SBO s/p exploratory laparotomy, lysis of adhesions, decompression of bowel via enterotomy w/ primary repair, & Abthera VAC placement (12/06/2019); RTOR for re-exploration w/ Abthera VAC replacement (12/08/2019) to allow for demarcation of ischemic small bowel; RTOR for re-exploration, small bowel resection of 150 cm (150 cm remaining), ileocecetomy, end ileostomy, mucous fistula, and abdominal closure (12/10/2019); hospital course complicated by SVT, short bowel syndrome requiring repletions w/ IV fluids, malnutrition requiring TPN, intermittent episodes of hypotension, spontaneous left pneumothorax s/p pigtail catheter (12/15/2019-12/24/2019), left pleural effusion s/p pigtail catheter w/ IR (12/30/2019), dysphagia, and oral HSV lesions, placement of Pleurx on 1/10, now re-admitted to SICU with acute hypoxic respiratory failure s/p repeat VATS with 2 liter hemothorax removed. Respiratory failure resolved, now stable in SICU.    Neuro: no acute issues  - Pain control as needed with acetaminophen    Resp: COPD, spontaneous left pneumothorax s/p pigtail catheter, left pleural effusion s/p Pleurx 1/10.  Intubated for respiratory distress s/p VATS 01/15 with 2 Liter of blood evacuation s/p removal of chest tubes   - Pulmicort and Duoneb for COPD  - Will need outpatient follow-up with a pulmonologist for his COPD as patient does not currently have one  -F/u repeat CXR tomorrow AM (1/31)     CV: no acute issues  - Monitor hemodynamics, I&Os    GI: s/p exploratory laparotomy, Grecia, decompression of bowel via enterotomy w/ primary repair, & Abthera VAC placement (12/06/2019); re-exploration w/ ABthera VAC replacement (12/08/2019); re-exploration, small bowel resection of 150 cm (150 cm remaining), ileocecectomy end ileostomy, mucous fistula, and abdominal closure (12/10/2019); short bowel syndrome, malnutrition, dysphagia  - Mechanical soft diet + TPN  - Monitor ostomy output  - Continue Lomotil, tincture of opium, loperamide, metamucil, and octreotide for short bowel syndrome  - Protonix to decrease gastric secretions  - Continue Vitamin D supplementation    Renal: polyuria 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 s/p DDAVP with decrease in urine output (last dose 1/28)  - Monitor I&Os and daily weights   - Monitor electrolytes and replete as necessary  - Flomax for urinary retention  - Daily assessment for DDAVP based on urine output  -I L bolus of NS given this AM (net negative)     Heme: anemia likely secondary to malnutrition / malabsorption  - Monitor CBC and coags   - Lovenox for VTE prophylaxis    ID: oral HSV lesions (s/p acyclovir 12/23/2019-01/02/2020); completed course of meropenem for Zosyn-resistant Pseudomonas PNA   - Monitor for fever, culture if febrile    Endo: possible adrenal insufficiency  - Monitor glucose on BMPs   - Follow up Cosyntropin test, takes 3-5 days sent on 1/28      Disposition: Will remain in SICU.

## 2020-01-30 NOTE — PROGRESS NOTE ADULT - ASSESSMENT
A/P: 75 year old male w/ PMH of COPD and EtOH dependence with PSH/o appy, prostate surgery, & spine surgery  septic shock and high grade SBO s/p exploratory laparotomy, lysis of adhesions, decompression of bowel via enterotomy w/ primary repair, and Abthera VAC placement on 12/6; s/p take down of Abthera, washout and re-application of the Abthera vac on 12/8. Now s/p SBR and end ileostomy on 12/10.   TPN consulted to assist w/ management of pt's nutrition in pt w/ prolonged hospital course now tolerating diet but has high Ileostomy output.  Pt s/p Lt Chest Pigtail for effusion in IR with persistent high output on 1/10/2020 pt had VATs & placement of Left PleurX catheter. Pt op pt developed hemothorax and Respiratory Distress.  Pt is s/p Lt chest Evacuation of 2L blood w/ placement of CT x2 on 1/15/2020 for Lt Pleural Effusion that's resolving and doing well after CT removed and Improving Rt PNA vs Pleural Effusion.     Pt remains in SICU w/ High Ostomy & Urine Output,   Plan to Reverse Ileostomy in February, possibly next week  Pt w/ improving severe Protein-Calorie Malnutrition-        Short Gut Syndrome w/Malabsorption        TPN doing well at 1/2 Goal- pt only able to eat approximately 1/2 of nutritional goals            Amino Acids 60g, Dextrose 140g, and Lipids 30g in 1600mL with 3mL MTE & 10mL MVI        Pt tolerating Soft Mechanical w/ Ensure & Prosource supplements             -Concern for adrenal insufficiency (1/26 AM cortisol = 5.9)        ACTH Stim test pending  - HypoCa- improving w/ increased Ca in TPN to 14mEq- continue to monitor        Improved Ca levels helpful for BP & muscle contraction  -HypoPhos- improving w/-increased NaPhos & will continue to monitor   -Fecal fat testing suggestive of decreased absorption of fat -         TPN w/ MVI to compensate for low Vit E & A        Vit D levels low- Ergocalciferol supplements started        Vit K INR/ PT near normal suggestive that it is being absor  -Edema greatly improved and Fluid overload resolved          w/ recent h/o HypoTN responded well to IVF           Tolerating Increased total vol TPN of 1600mL           Increased osm with NaPhos 60mMol and NaCl 40mEq to TPN  -Increased Urine Output- will continue to monitor  -Strict Intake and Output -            Increased ostomy output- increased TPN volume, continue to monitor           Continue to monitor while on octreotide, lomotil, tincture of opium, & imodium  -Leukocytosis- resolved- observing off abx  -Hyperglycemia-improving      Fingersticks & ISS coverage - as per SICU  -To continue monitoring of nutrition - Weights three times a week; Daily CMP, Mg, Ionized Ca, Phosphorus,        and Weekly Triglycerides and Pre-albumin  Continue as per SICU/Surgery, will follow with you, D/w primary team    Andreina Hubbard PA-C  TPN team, pager [phone]  D/w Ana M Chacko & MU Siegel

## 2020-01-30 NOTE — PROGRESS NOTE ADULT - SUBJECTIVE AND OBJECTIVE BOX
HISTORY  75y Male past medical history of COPD and EtOH dependence who presented on 12/6/2019 with abdominal pain, nausea, hematemesis, and poor PO intake for ~2-3 days. In the ED, he was found to be hypotensive & tachycardic. Labs revealed an CHI and lactic acidosis. Imaging revealed a high grade SBO in the RLQ on CT scan. Hospital course is as follows:  12/06 - s/p exploratory laparotomy, lysis of adhesions, decompression of bowel via enterotomy w/ primary repair, and Abthera VAC placement as the distal 50% of the bowel appeared dusky but was still viable, admitted to SICU post-operatively as he was on vasopressor support and was left intubated  12/08 - s/p re-exploration, proximal 165 cm of small bowel appeared pink & viable but beyond that had patchy areas of ischemia so decision was made to give the bowel more time to demarcate before resecting in order to preserve as much small bowel as possible so Abthera VAC was replaced  12/09 - s/p re-exploration, small bowel resection of 150 cm (150 cm remaining), ileocecetomy, end ileostomy, mucous fistula, and abdominal closure  12/11 - extubated to BiPAP, noted to be in SVT that was rate controlled w/ metoprolol  12/14 - started on TPN  12/15 - noted to have spontaneous left pneumothorax s/p left pigtail catheter placement, noted to be in SVT again so amiodarone started, high ileostomy output noted so concern for short bowel syndrome  12/16 - amiodarone discontinued, started on beta blocker with metoprolol  12/18 - started Lomotil and ileostomy repletions with IV fluids  12/19 - started Imodium, left pigtail catheter was placed to water seal but pneumothorax noted on follow-up CXR so placed back to suction  12/20 - started tincture of opium, concern for aspiration so changed diet from regular to dysphagia 1 pureed with nectar-thickened liquids  12/22 - left pigtail catheter placed to water seal with no pneumothorax noted on follow-up CXR, CT chest with moderate left pleural effusion not being drained by pigtail catheter  12/23 - 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  12/24 - left pigtail catheter discontinued  12/28 - started Ancef for erythematous midline wound  12/29 - beta blocker discontinued for intermittent episodes of hypotension  12/30 - left pigtail catheter placement by IR with drainage of serous transudative fluid  01/02 - FEES demonstrating penetration with thin liquids so patient kept on dysphagia 1 pureed with nectar-thickened liquids  01/07 - Transferred to floors  01/10 - s/p left VATS w/ PleurX catheter placement  01/11 - evaluated by speech & swallow, advanced diet to mechanical soft with thin liquids  01/12 - PleurX catheter placed to water seal with on pneumothorax on follow-up CXR  01/13 - RRT for hypoxemia, placed on BiPAP, PleurX catheter placed back to suction, started vancomycin & Zosyn for possible HCAP  01/14 - noted to have minimal output from PleurX catheter, lung ultrasound with large left pleural effusion concerning for hemothorax, multiple episodes of bradycardia secondary to hypoxemia, remains on BiPAP  01/15 - worsening respiratory distress requiring intubation, hypotensive requiring vasopressor support, CT chest with large left hemothorax w/ trace pneumothorax & extensive subcutaneous emphysema so taken to OR emergently for left VATS, evacuation fo 2 L of clot, and placement of two left chest tubes  01/16 - extubated, weaned off vasopressor support  01/17 - two left chest tubes placed to water seal with no pneumothorax noted on follow-up CXR    24 HOUR EVENTS:  -Ostomy output decreased, will continue to monitor.  -Awaiting operative plan from primary surgeon.  -Discontinued BiPAP, no further needs or acute respiratory issues.    SUBJECTIVE/ROS:  [x] A ten-point review of systems was otherwise negative except as noted.  [ ] Due to altered mental status/intubation, subjective information were not able to be obtained from the patient. History was obtained, to the extent possible, from review of the chart and collateral sources of information.      NEURO  Exam: A and O x3, WILEY, FC  Meds: acetaminophen   Tablet .. 975 milliGRAM(s) Oral every 6 hours PRN Mild Pain (1 - 3)  ondansetron Injectable 4 milliGRAM(s) IV Push every 6 hours PRN Nausea and/or Vomiting      [x] Adequacy of sedation and pain control has been assessed and adjusted      RESPIRATORY  RR: 38 (01-29-20 @ 23:00) (18 - 41)  SpO2: 98% (01-30-20 @ 00:22) (93% - 100%)  Wt(kg): --  Exam: unlabored, clear to auscultation bilaterally  Mechanical Ventilation:  N/A    [ ] Extubation Readiness Assessed  N/A  Meds: albuterol/ipratropium for Nebulization. 3 milliLiter(s) Nebulizer every 6 hours  buDESOnide    Inhalation Suspension 0.5 milliGRAM(s) Inhalation every 12 hours        CARDIOVASCULAR  HR: 78 (01-30-20 @ 00:22) (70 - 93)  BP: 129/59 (01-29-20 @ 23:00) (91/52 - 145/78)  BP(mean): 85 (01-29-20 @ 23:00) (66 - 98)  ABP: --  ABP(mean): --  Wt(kg): --  CVP(cm H2O): --  VBG - ( 29 Jan 2020 01:05 )  pH: 7.37  /  pCO2: 65    /  pO2: 45    / HCO3: 37    / Base Excess: 10.4  /  SaO2: 78     Lactate: 0.6      Exam: Regular rate and rhythm, normal s1/s2  Cardiac Rhythm: NSR  Perfusion     [x]Adequate   [ ]Inadequate  Mentation   [x]Normal       [ ]Reduced  Extremities  [x]Warm         [ ]Cool  Volume Status [ ]Hypervolemic [x]Euvolemic [ ]Hypovolemic    GI/NUTRITION  Exam: Soft, NT, ND. Ostomy pink and functioning with liquid stool  Diet: Mechanical soft with TPN  Meds: diphenoxylate/atropine 2 Tablet(s) Oral <User Schedule>  loperamide 16 milliGRAM(s) Oral <User Schedule>  pantoprazole    Tablet 40 milliGRAM(s) Oral before breakfast  psyllium Powder 1 Packet(s) Oral three times a day  opium Tincture 6 milliGRAM(s) Oral <User Schedule>  ergocalciferol 74430 Unit(s) Oral every week  fat emulsion (Fish Oil and Plant Based) 20% Infusion 12.5 mL/Hr IV Continuous <Continuous>  Parenteral Nutrition - Adult 1 Each TPN Continuous <Continuous>   octreotide  Injectable 100 MICROGram(s) SubCutaneous three times a day    GENITOURINARY  I&O's Detail    01-28 @ 07:01  -  01-29 @ 07:00  --------------------------------------------------------  IN:    fat emulsion (Fish Oil and Plant Based) 20% Infusion: 187.5 mL    multiple electrolytes Injection Type 1 Bolus: 1000 mL    Oral Fluid: 2190 mL    Solution: 50 mL    TPN (Total Parenteral Nutrition): 1455 mL  Total IN: 4882.5 mL    OUT:    Ileostomy: 2900 mL    Voided: 1800 mL  Total OUT: 4700 mL    Total NET: 182.5 mL      01-29 @ 07:01 - 01-30 @ 00:25  --------------------------------------------------------  IN:    fat emulsion (Fish Oil and Plant Based) 20% Infusion: 87.5 mL    Oral Fluid: 760 mL    TPN (Total Parenteral Nutrition): 1072 mL  Total IN: 1919.5 mL    OUT:    Ileostomy: 1450 mL    Voided: 1310 mL  Total OUT: 2760 mL    Total NET: -840.5 mL          01-29    133<L>  |  95<L>  |  21  ----------------------------<  109<H>  4.4   |  31  |  0.41<L>    Ca    8.2<L>      29 Jan 2020 00:52  Phos  3.0     01-29  Mg     1.8     01-29    TPro  6.2  /  Alb  2.6<L>  /  TBili  0.5  /  DBili  0.3<H>  /  AST  30  /  ALT  42  /  AlkPhos  86  01-29    [ ] Martinez catheter, indication: N/A  Meds: tamsulosin 0.4 milliGRAM(s) Oral daily    HEMATOLOGIC  Meds: enoxaparin Injectable 40 milliGRAM(s) SubCutaneous daily    [x] VTE Prophylaxis                        8.3    8.61  )-----------( 287      ( 29 Jan 2020 00:52 )             26.1       INFECTIOUS DISEASES  T(C): 36.6 (01-29-20 @ 23:00), Max: 37.2 (01-29-20 @ 05:00)  Wt(kg): --  WBC Count: 8.61 K/uL (01-29 @ 00:52)    Recent Cultures:  Specimen Source: .Blood Blood, 01-26 @ 03:52; Results   No growth to date.; Gram Stain: --; Organism: --      ACCESS DEVICES:  [ ] Peripheral IV  [ ] Central Venous Line	[ ] R	[ ] L	[ ] IJ	[ ] Fem	[ ] SC	Placed:   [ ] Arterial Line		[ ] R	[ ] L	[ ] Fem	[ ] Rad	[ ] Ax	Placed:   [x] PICC:	 1/22 RUE				[ ] Mediport  [ ] Urinary Catheter, Date Placed:   [ ] Necessity of urinary, arterial, and venous catheters discussed    OTHER MEDICATIONS:  chlorhexidine 2% Cloths 1 Application(s) Topical <User Schedule>  diphenhydramine 2%/zinc acetate 0.1% Cream 1 Application(s) Topical two times a day PRN

## 2020-01-30 NOTE — PROGRESS NOTE ADULT - SUBJECTIVE AND OBJECTIVE BOX
Subjective: Patient seen and examined. No new events except as noted.   remains in ICU   no cp or sob   24 HOUR EVENTS:   -Ostomy output decreased, will continue to monitor.  -Awaiting operative plan from primary surgeon.  -Discontinued BiPAP, no further needs or acute respiratory issues.  - Patient seen and examined at bedside  - Patient feels well, tolerating diet, on TPN, oob/ambi, pain controlled, denies f/c/n/v/d/sob      REVIEW OF SYSTEMS:    CONSTITUTIONAL: +weakness, fevers or chills  EYES/ENT: No visual changes;  No vertigo or throat pain   NECK: No pain or stiffness  RESPIRATORY: No cough, wheezing, hemoptysis; No shortness of breath  CARDIOVASCULAR: No chest pain or palpitations  GASTROINTESTINAL: No abdominal or epigastric pain. No nausea, vomiting, or hematemesis; No diarrhea or constipation. No melena or hematochezia.  GENITOURINARY: No dysuria, frequency or hematuria  NEUROLOGICAL: No numbness or weakness  SKIN: No itching, burning, rashes, or lesions   All other review of systems is negative unless indicated above.    MEDICATIONS:  MEDICATIONS  (STANDING):  albuterol/ipratropium for Nebulization. 3 milliLiter(s) Nebulizer every 6 hours  buDESOnide    Inhalation Suspension 0.5 milliGRAM(s) Inhalation every 12 hours  chlorhexidine 2% Cloths 1 Application(s) Topical <User Schedule>  diphenoxylate/atropine 2 Tablet(s) Oral <User Schedule>  enoxaparin Injectable 40 milliGRAM(s) SubCutaneous daily  ergocalciferol 93476 Unit(s) Oral every week  fat emulsion (Fish Oil and Plant Based) 20% Infusion 12.5 mL/Hr (12.5 mL/Hr) IV Continuous <Continuous>  loperamide 16 milliGRAM(s) Oral <User Schedule>  octreotide  Injectable 100 MICROGram(s) SubCutaneous three times a day  opium Tincture 6 milliGRAM(s) Oral <User Schedule>  pantoprazole    Tablet 40 milliGRAM(s) Oral before breakfast  Parenteral Nutrition - Adult 1 Each (67 mL/Hr) TPN Continuous <Continuous>  Parenteral Nutrition - Adult 1 Each (67 mL/Hr) TPN Continuous <Continuous>  psyllium Powder 1 Packet(s) Oral three times a day  tamsulosin 0.4 milliGRAM(s) Oral daily      PHYSICAL EXAM:  T(C): 37.4 (01-30-20 @ 11:00), Max: 37.6 (01-30-20 @ 07:00)  HR: 95 (01-30-20 @ 11:49) (73 - 96)  BP: 107/59 (01-30-20 @ 11:00) (91/52 - 145/78)  RR: 23 (01-30-20 @ 11:00) (18 - 39)  SpO2: 100% (01-30-20 @ 11:49) (92% - 100%)  Wt(kg): --  I&O's Summary    29 Jan 2020 07:01  -  30 Jan 2020 07:00  --------------------------------------------------------  IN: 2501 mL / OUT: 4610 mL / NET: -2109 mL    30 Jan 2020 07:01  -  30 Jan 2020 11:59  --------------------------------------------------------  IN: 1388 mL / OUT: 1500 mL / NET: -112 mL            Appearance: NAD	  HEENT:   Normal oral mucosa, PERRL, EOMI	  Lymphatic: No lymphadenopathy , no edema  Cardiovascular: Normal S1 S2, No JVD, No murmurs , Peripheral pulses palpable 2+ bilaterally  Respiratory: Lungs clear to auscultation, normal effort 	  Gastrointestinal:  Soft, Non-tender, + Ileostomy   Skin: No rashes, No ecchymoses, No cyanosis, warm to touch  Musculoskeletal: Normal range of motion, normal strength  Psychiatry:  Mood & affect appropriate  Ext: No edema  +rosas       LABS:    CARDIAC MARKERS:                                8.2    10.31 )-----------( 307      ( 30 Jan 2020 00:27 )             26.7     01-30    135  |  96  |  19  ----------------------------<  110<H>  4.5   |  32<H>  |  0.45<L>    Ca    8.4      30 Jan 2020 00:27  Phos  3.7     01-30  Mg     1.8     01-30    TPro  6.6  /  Alb  2.7<L>  /  TBili  0.7  /  DBili  0.3<H>  /  AST  22  /  ALT  37  /  AlkPhos  94  01-30    proBNP:   Lipid Profile:   HgA1c:   TSH:             TELEMETRY: 	  SR  ECG:  	  RADIOLOGY: < from: Xray Chest 1 View- PORTABLE-Routine (01.28.20 @ 07:06) >    EXAM:  XR CHEST PORTABLE ROUTINE 1V                            PROCEDURE DATE:  01/28/2020            INTERPRETATION:  CLINICAL INFORMATION: Status post left VATS.    EXAM: Frontal radiograph of the chest.    COMPARISON: Chest x-ray 1/27/2020.    FINDINGS:    Lines and Tubes: Right PICC with tip in SVC.    Lungs: Bibasilar opacities, unchanged.    Pleura: Partially imaged hemidiaphragms.    Mediastinum: Heart size is normal.    Skeletal: Unremarkable.      IMPRESSION:    Bibasilar opacities, unchanged.                YOLETTE TAVERA M.D., RADIOLOGY RESIDENT  This document has been electronically signed.  CALLY TEMPLE M.D., ATTENDING RADIOLOGIST  This document has been electronically signed. Jan 28 2020  4:13PM                < end of copied text >    DIAGNOSTIC TESTING:  [ ] Echocardiogram:  [ ]  Catheterization:  [ ] Stress Test:    OTHER:

## 2020-01-30 NOTE — PROGRESS NOTE ADULT - SUBJECTIVE AND OBJECTIVE BOX
[facility] NUTRITION SUPPORT / TPN -- FOLLOW UP NOTE  --------------------------------------------------------------------------------    24 hour events/subjective:  Pt w/o c/o N/V     tolerating diet  Tired again this am-  slept well but was OOB ambulating yesterday pm  Pt w/o c/o pain     pt feels less swollen  No leukocytosis  Acth stim test pending   increased Urine Output=  DDAVP given- good response        Urine Output 1800mL  No cough/ cp/ palp/dyspnea/ sob  No f/c/s  no N/V  Increased ostomy output w/ 2500mL over 24 hours       continue lomotil, imodium, octreotide, tincture of opium at max doses        Diet:  Diet, Mechanical Soft:   No Carb Prosource (1pkg = 15gms Protein)     Qty per Day:  1  Supplement Feeding Modality:  Oral  Ensure Enlive Cans or Servings Per Day:  2       Frequency:  Daily (01-24-20 @ 11:32)      Appetite: [  ]Poor [  ]Adequate [ x ]Good  Caloric intake:  [ x  ]  Adequate   [   ] Inadequate    ROS: General/ GI see HPI  all other systems negative      ALLERGIES & MEDICATIONS  --------------------------------------------------------------------------------  ALLERGIES  IV Contrast (Hives)      STANDING INPATIENT MEDICATIONS    albuterol/ipratropium for Nebulization. 3 milliLiter(s) Nebulizer every 6 hours  buDESOnide    Inhalation Suspension 0.5 milliGRAM(s) Inhalation every 12 hours  chlorhexidine 2% Cloths 1 Application(s) Topical <User Schedule>  diphenoxylate/atropine 2 Tablet(s) Oral <User Schedule>  enoxaparin Injectable 40 milliGRAM(s) SubCutaneous daily  ergocalciferol 17451 Unit(s) Oral every week  fat emulsion (Fish Oil and Plant Based) 20% Infusion 12.5 mL/Hr IV Continuous <Continuous>  loperamide 16 milliGRAM(s) Oral <User Schedule>  octreotide  Injectable 100 MICROGram(s) SubCutaneous three times a day  opium Tincture 6 milliGRAM(s) Oral <User Schedule>  pantoprazole    Tablet 40 milliGRAM(s) Oral before breakfast  Parenteral Nutrition - Adult 1 Each TPN Continuous <Continuous>  psyllium Powder 1 Packet(s) Oral three times a day  tamsulosin 0.4 milliGRAM(s) Oral daily      PRN INPATIENT MEDICATION  acetaminophen   Tablet .. 975 milliGRAM(s) Oral every 6 hours PRN  diphenhydramine 2%/zinc acetate 0.1% Cream 1 Application(s) Topical two times a day PRN  ondansetron Injectable 4 milliGRAM(s) IV Push every 6 hours PRN        VITALS/PHYSICAL EXAM  --------------------------------------------------------------------------------  T(C): 37.6 (01-30-20 @ 07:00), Max: 37.6 (01-30-20 @ 07:00)  HR: 90 (01-30-20 @ 10:00) (73 - 93)  BP: 115/57 (01-30-20 @ 10:00) (91/52 - 145/78)  RR: 24 (01-30-20 @ 10:00) (18 - 41)  SpO2: 100% (01-30-20 @ 10:00) (92% - 100%)  Wt(kg): --        01-29-20 @ 07:01  -  01-30-20 @ 07:00  --------------------------------------------------------  IN: 2501 mL / OUT: 4610 mL / NET: -2109 mL    01-30-20 @ 07:01  -  01-30-20 @ 10:32  --------------------------------------------------------  IN: 1201 mL / OUT: 0 mL / NET: 1201 mL      PHYSICAL EXAM:  --------------------------------------------------------------------------------  	Gen: guarded but stable, A&Ox3, NC O2  	HEENT: NC/AT, PERRL, supple neck, trachea midline, mucosa moist              Chest: CTA; Lt chest w/ dressing c/d/i  	GI: (+) BS, softly distended, non tender                    midline incision c/d/i w/o drainage                   (+)ostomy pink viable- thick loose BM green/ tan colored              MSK: FROM x4, no contractures nor deformities  	Vascular: Equally Warm, (+)BLE mild edema improving,  no clubbing, cyanosis,                       BUE w/o edema, clubbing & cyanosis                       RUE PICC w/o sx infection   	Neuro: No focal deficits, intact sensation, weakened strength BLE>BUE  	Psych: Normal affect and mood        LABS/ CULTURES/ RADIOLOGY:              8.2    10.31 >-----------<  307      [01-30-20 @ 00:27]              26.7     135  |  96  |  19  ----------------------------<  110      [01-30-20 @ 00:27]  4.5   |  32  |  0.45        Ca     8.4     [01-30-20 @ 00:27]      iCa    1.16     [01-30 @ 00:28]      Mg     1.8     [01-30-20 @ 00:27]      Phos  3.7     [01-30-20 @ 00:27]    TPro  6.6  /  Alb  2.7  /  TBili  0.7  /  DBili  0.3  /  AST  22  /  ALT  37  /  AlkPhos  94  [01-30-20 @ 00:27]      Blood Gas Calcium, Ionized - Venous: 1.18 mmoL/L (01-29-20 @ 01:05)      Prealbumin, Serum: 19 mg/dL (01-27-20 @ 04:52)  Prealbumin, Serum: 16 mg/dL (01-24-20 @ 02:53)  Prealbumin, Serum: 13 mg/dL (01-21-20 @ 02:00)  Prealbumin, Serum: 15 mg/dL (01-15-20 @ 09:01)  Prealbumin, Serum: 13 mg/dL (01-14-20 @ 07:29)        ASSESSMENT/PLAN:  75y Male      1. Protein calorie malnutrition being optimized with TPN:  AA  [ ] gm. CHO [ ] gm. Lipids [ ] gm.  2.  Hyperglycemia managed with: [ ] units of regular insulin    3. Check fluid balance daily.  Strict I/O  [ ] [ ]   4.  Daily BMP, Ionized Calcium, Magnesium and Phosphorous   5.  Triglycerides x 3 days at initiation of TPN then monthly [ ]     TPN pager [phone], spectra [phone]  d/w Dr. Chacko and Dr. MELISSA Siegel [facility] NUTRITION SUPPORT / TPN -- FOLLOW UP NOTE  --------------------------------------------------------------------------------    24 hour events/subjective:  Pt w/o c/o N/V     tolerating diet w/o pain  Tired again this am-  slept well, but was OOB ambulating yesterday pm  Pt w/o c/o pain     pt feels less swollen  No leukocytosis  Acth stim test pending   Increased ostomy output w/ 2500mL over 24 hours       continue lomotil, imodium, octreotide, tincture of opium at max doses  increased Urine Output        Urine Output 2110mL  No cough/ cp/ palp/dyspnea/ sob  No f/c/s  no N/V        Diet:  Diet, Mechanical Soft:   No Carb Prosource (1pkg = 15gms Protein)     Qty per Day:  1  Supplement Feeding Modality:  Oral  Ensure Enlive Cans or Servings Per Day:  2       Frequency:  Daily (01-24-20 @ 11:32)      Appetite: [  ]Poor [  ]Adequate [ x ]Good  Caloric intake:  [ x  ]  Adequate   [   ] Inadequate    ROS: General/ GI see HPI  all other systems negative      ALLERGIES & MEDICATIONS  --------------------------------------------------------------------------------  ALLERGIES  IV Contrast (Hives)      STANDING INPATIENT MEDICATIONS    albuterol/ipratropium for Nebulization. 3 milliLiter(s) Nebulizer every 6 hours  buDESOnide    Inhalation Suspension 0.5 milliGRAM(s) Inhalation every 12 hours  chlorhexidine 2% Cloths 1 Application(s) Topical <User Schedule>  diphenoxylate/atropine 2 Tablet(s) Oral <User Schedule>  enoxaparin Injectable 40 milliGRAM(s) SubCutaneous daily  ergocalciferol 39898 Unit(s) Oral every week  fat emulsion (Fish Oil and Plant Based) 20% Infusion 12.5 mL/Hr IV Continuous <Continuous>  loperamide 16 milliGRAM(s) Oral <User Schedule>  octreotide  Injectable 100 MICROGram(s) SubCutaneous three times a day  opium Tincture 6 milliGRAM(s) Oral <User Schedule>  pantoprazole    Tablet 40 milliGRAM(s) Oral before breakfast  Parenteral Nutrition - Adult 1 Each TPN Continuous <Continuous>  psyllium Powder 1 Packet(s) Oral three times a day  tamsulosin 0.4 milliGRAM(s) Oral daily      PRN INPATIENT MEDICATION  acetaminophen   Tablet .. 975 milliGRAM(s) Oral every 6 hours PRN  diphenhydramine 2%/zinc acetate 0.1% Cream 1 Application(s) Topical two times a day PRN  ondansetron Injectable 4 milliGRAM(s) IV Push every 6 hours PRN        VITALS/PHYSICAL EXAM  --------------------------------------------------------------------------------  T(C): 37.6 (01-30-20 @ 07:00), Max: 37.6 (01-30-20 @ 07:00)  HR: 90 (01-30-20 @ 10:00) (73 - 93)  BP: 115/57 (01-30-20 @ 10:00) (91/52 - 145/78)  RR: 24 (01-30-20 @ 10:00) (18 - 41)  SpO2: 100% (01-30-20 @ 10:00) (92% - 100%)  Wt(kg): --        01-29-20 @ 07:01  -  01-30-20 @ 07:00  --------------------------------------------------------  IN: 2501 mL / OUT: 4610 mL / NET: -2109 mL    01-30-20 @ 07:01  -  01-30-20 @ 10:32  --------------------------------------------------------  IN: 1201 mL / OUT: 0 mL / NET: 1201 mL      PHYSICAL EXAM:  --------------------------------------------------------------------------------  	Gen: guarded but stable, A&Ox3, NC O2  	HEENT: NC/AT, PERRL, supple neck, trachea midline, mucosa moist              Chest: CTA; Lt chest w/ dressing c/d/i  	GI: (+) BS, softly distended, non tender                    midline incision c/d/i w/o drainage                   (+)ostomy pink viable- thick loose BM green/ tan colored              MSK: FROM x4, no contractures nor deformities  	Vascular: Equally Warm, (+)BLE mild edema improving,  no clubbing, cyanosis,                       BUE w/o edema, clubbing & cyanosis                       RUE PICC w/o sx infection   	Neuro: No focal deficits, intact sensation, weakened strength BLE>BUE  	Psych: Normal affect and mood        LABS/ CULTURES/ RADIOLOGY:              8.2    10.31 >-----------<  307      [01-30-20 @ 00:27]              26.7     135  |  96  |  19  ----------------------------<  110      [01-30-20 @ 00:27]  4.5   |  32  |  0.45        Ca     8.4     [01-30-20 @ 00:27]      iCa    1.16     [01-30 @ 00:28]      Mg     1.8     [01-30-20 @ 00:27]      Phos  3.7     [01-30-20 @ 00:27]    TPro  6.6  /  Alb  2.7  /  TBili  0.7  /  DBili  0.3  /  AST  22  /  ALT  37  /  AlkPhos  94  [01-30-20 @ 00:27]      Blood Gas Calcium, Ionized - Venous: 1.18 mmoL/L (01-29-20 @ 01:05)      Prealbumin, Serum: 19 mg/dL (01-27-20 @ 04:52)  Prealbumin, Serum: 16 mg/dL (01-24-20 @ 02:53)  Prealbumin, Serum: 13 mg/dL (01-21-20 @ 02:00)  Prealbumin, Serum: 15 mg/dL (01-15-20 @ 09:01)  Prealbumin, Serum: 13 mg/dL (01-14-20 @ 07:29)        ASSESSMENT/PLAN:  75y Male      1. Protein calorie malnutrition being optimized with TPN:  AA  [ ] gm. CHO [ ] gm. Lipids [ ] gm.  2.  Hyperglycemia managed with: [ ] units of regular insulin    3. Check fluid balance daily.  Strict I/O  [ ] [ ]   4.  Daily BMP, Ionized Calcium, Magnesium and Phosphorous   5.  Triglycerides x 3 days at initiation of TPN then monthly [ ]     TPN pager [phone], spectra [phone]  d/w Dr. Chacko and Dr. MELISSA Siegel [facility] NUTRITION SUPPORT / TPN -- FOLLOW UP NOTE  --------------------------------------------------------------------------------    24 hour events/subjective:  Pt w/o c/o N/V     tolerating diet w/o pain  Tired again this am-  slept well, but was OOB ambulating yesterday pm  Pt w/o c/o pain     pt feels less swollen  No leukocytosis  Acth stim test pending   Increased ostomy output w/ 2500mL over 24 hours       continue lomotil, imodium, octreotide, tincture of opium at max doses  increased Urine Output        Urine Output 2110mL  No cough/ cp/ palp/dyspnea/ sob  No f/c/s  no N/V        Diet:  Diet, Mechanical Soft:   No Carb Prosource (1pkg = 15gms Protein)     Qty per Day:  1  Supplement Feeding Modality:  Oral  Ensure Enlive Cans or Servings Per Day:  2       Frequency:  Daily (01-24-20 @ 11:32)      Appetite: [  ]Poor [  ]Adequate [ x ]Good  Caloric intake:  [ x  ]  Adequate   [   ] Inadequate    ROS: General/ GI see HPI  all other systems negative      ALLERGIES & MEDICATIONS  --------------------------------------------------------------------------------  ALLERGIES  IV Contrast (Hives)      STANDING INPATIENT MEDICATIONS    albuterol/ipratropium for Nebulization. 3 milliLiter(s) Nebulizer every 6 hours  buDESOnide    Inhalation Suspension 0.5 milliGRAM(s) Inhalation every 12 hours  chlorhexidine 2% Cloths 1 Application(s) Topical <User Schedule>  diphenoxylate/atropine 2 Tablet(s) Oral <User Schedule>  enoxaparin Injectable 40 milliGRAM(s) SubCutaneous daily  ergocalciferol 05541 Unit(s) Oral every week  fat emulsion (Fish Oil and Plant Based) 20% Infusion 12.5 mL/Hr IV Continuous <Continuous>  loperamide 16 milliGRAM(s) Oral <User Schedule>  octreotide  Injectable 100 MICROGram(s) SubCutaneous three times a day  opium Tincture 6 milliGRAM(s) Oral <User Schedule>  pantoprazole    Tablet 40 milliGRAM(s) Oral before breakfast  Parenteral Nutrition - Adult 1 Each TPN Continuous <Continuous>  psyllium Powder 1 Packet(s) Oral three times a day  tamsulosin 0.4 milliGRAM(s) Oral daily      PRN INPATIENT MEDICATION  acetaminophen   Tablet .. 975 milliGRAM(s) Oral every 6 hours PRN  diphenhydramine 2%/zinc acetate 0.1% Cream 1 Application(s) Topical two times a day PRN  ondansetron Injectable 4 milliGRAM(s) IV Push every 6 hours PRN        VITALS/PHYSICAL EXAM  --------------------------------------------------------------------------------  T(C): 37.6 (01-30-20 @ 07:00), Max: 37.6 (01-30-20 @ 07:00)  HR: 90 (01-30-20 @ 10:00) (73 - 93)  BP: 115/57 (01-30-20 @ 10:00) (91/52 - 145/78)  RR: 24 (01-30-20 @ 10:00) (18 - 41)  SpO2: 100% (01-30-20 @ 10:00) (92% - 100%)  Wt(kg): --        01-29-20 @ 07:01  -  01-30-20 @ 07:00  --------------------------------------------------------  IN: 2501 mL / OUT: 4610 mL / NET: -2109 mL    01-30-20 @ 07:01  -  01-30-20 @ 10:32  --------------------------------------------------------  IN: 1201 mL / OUT: 0 mL / NET: 1201 mL      PHYSICAL EXAM:  --------------------------------------------------------------------------------  	Gen: guarded but stable, A&Ox3, NC O2  	HEENT: NC/AT, PERRL, supple neck, trachea midline, mucosa moist              Chest: CTA; Lt chest w/ dressing c/d/i  	GI: (+) BS, softly distended, non tender                    midline incision c/d/i w/o drainage                   (+)ostomy pink viable- thick loose BM green/ tan colored              MSK: FROM x4, no contractures nor deformities  	Vascular: Equally Warm, (+)BLE mild edema improving,  no clubbing, cyanosis,                       BUE w/o edema, clubbing & cyanosis                       RUE PICC w/o sx infection   	Neuro: No focal deficits, intact sensation, weakened strength BLE>BUE  	Psych: Normal affect and mood        LABS/ CULTURES/ RADIOLOGY:              8.2    10.31 >-----------<  307      [01-30-20 @ 00:27]              26.7     135  |  96  |  19  ----------------------------<  110      [01-30-20 @ 00:27]  4.5   |  32  |  0.45        Ca     8.4     [01-30-20 @ 00:27]      iCa    1.16     [01-30 @ 00:28]      Mg     1.8     [01-30-20 @ 00:27]      Phos  3.7     [01-30-20 @ 00:27]    TPro  6.6  /  Alb  2.7  /  TBili  0.7  /  DBili  0.3  /  AST  22  /  ALT  37  /  AlkPhos  94  [01-30-20 @ 00:27]      Blood Gas Calcium, Ionized - Venous: 1.18 mmoL/L (01-29-20 @ 01:05)      Prealbumin, Serum: 19 mg/dL (01-27-20 @ 04:52)  Prealbumin, Serum: 16 mg/dL (01-24-20 @ 02:53)  Prealbumin, Serum: 13 mg/dL (01-21-20 @ 02:00)  Prealbumin, Serum: 15 mg/dL (01-15-20 @ 09:01)  Prealbumin, Serum: 13 mg/dL (01-14-20 @ 07:29)

## 2020-01-30 NOTE — PROGRESS NOTE ADULT - SUBJECTIVE AND OBJECTIVE BOX
V/S:  Vital Signs Last 24 Hrs  T(C): 37.6 (30 Jan 2020 07:00), Max: 37.6 (30 Jan 2020 07:00)  T(F): 99.7 (30 Jan 2020 07:00), Max: 99.7 (30 Jan 2020 07:00)  HR: 90 (30 Jan 2020 10:00) (73 - 93)  BP: 115/57 (30 Jan 2020 10:00) (91/52 - 145/78)  BP(mean): 82 (30 Jan 2020 10:00) (66 - 98)  RR: 24 (30 Jan 2020 10:00) (18 - 41)  SpO2: 100% (30 Jan 2020 10:00) (92% - 100%)    --------------------------------------------------------------------------------------------------  I/Os:    29 Jan 2020 07:01  -  30 Jan 2020 07:00  --------------------------------------------------------  IN:    fat emulsion (Fish Oil and Plant Based) 20% Infusion: 150 mL    Oral Fluid: 760 mL    Solution: 50 mL    TPN (Total Parenteral Nutrition): 1541 mL  Total IN: 2501 mL    OUT:    Ileostomy: 2500 mL    Voided: 2110 mL  Total OUT: 4610 mL    Total NET: -2109 mL      30 Jan 2020 07:01  -  30 Jan 2020 10:13  --------------------------------------------------------  IN:    Sodium Chloride 0.9% IV Bolus: 1000 mL    TPN (Total Parenteral Nutrition): 201 mL  Total IN: 1201 mL    OUT:  Total OUT: 0 mL    Total NET: 1201 mL        --------------------------------------------------------------------------------------------------  LABS:                        8.2    10.31 )-----------( 307      ( 30 Jan 2020 00:27 )             26.7     30 Jan 2020 00:27    135    |  96     |  19     ----------------------------<  110    4.5     |  32     |  0.45     Ca    8.4        30 Jan 2020 00:27  Phos  3.7       30 Jan 2020 00:27  Mg     1.8       30 Jan 2020 00:27    TPro  6.6    /  Alb  2.7    /  TBili  0.7    /  DBili  0.3    /  AST  22     /  ALT  37     /  AlkPhos  94     30 Jan 2020 00:27      CAPILLARY BLOOD GLUCOSE            LIVER FUNCTIONS - ( 30 Jan 2020 00:27 )  Alb: 2.7 g/dL / Pro: 6.6 g/dL / ALK PHOS: 94 U/L / ALT: 37 U/L / AST: 22 U/L / GGT: x               --------------------------------------------------------------------------------------------------  MEDICATIONS  (STANDING):  albuterol/ipratropium for Nebulization. 3 milliLiter(s) Nebulizer every 6 hours  buDESOnide    Inhalation Suspension 0.5 milliGRAM(s) Inhalation every 12 hours  chlorhexidine 2% Cloths 1 Application(s) Topical <User Schedule>  diphenoxylate/atropine 2 Tablet(s) Oral <User Schedule>  enoxaparin Injectable 40 milliGRAM(s) SubCutaneous daily  ergocalciferol 53260 Unit(s) Oral every week  fat emulsion (Fish Oil and Plant Based) 20% Infusion 12.5 mL/Hr (12.5 mL/Hr) IV Continuous <Continuous>  loperamide 16 milliGRAM(s) Oral <User Schedule>  octreotide  Injectable 100 MICROGram(s) SubCutaneous three times a day  opium Tincture 6 milliGRAM(s) Oral <User Schedule>  pantoprazole    Tablet 40 milliGRAM(s) Oral before breakfast  Parenteral Nutrition - Adult 1 Each (67 mL/Hr) TPN Continuous <Continuous>  psyllium Powder 1 Packet(s) Oral three times a day  tamsulosin 0.4 milliGRAM(s) Oral daily    MEDICATIONS  (PRN):  acetaminophen   Tablet .. 975 milliGRAM(s) Oral every 6 hours PRN Mild Pain (1 - 3)  diphenhydramine 2%/zinc acetate 0.1% Cream 1 Application(s) Topical two times a day PRN Itching  ondansetron Injectable 4 milliGRAM(s) IV Push every 6 hours PRN Nausea and/or Vomiting  GENERAL SURGERY PROGRESS NOTE    SUBJECTIVE/24 HOUR EVENTS:   -Ostomy output decreased, will continue to monitor.  -Awaiting operative plan from primary surgeon.  -Discontinued BiPAP, no further needs or acute respiratory issues.  - Patient seen and examined at bedside  - Patient feels well, tolerating diet, on TPN, oob/ambi, pain controlled, denies f/c/n/v/d/sob    PHYSICAL EXAM:  General: Appears well, NAD  Neuro: AAOx3  CHEST: Clear to auscultation bilaterally,  CV: Regular rate and rhythm  Abdomen: soft, nontender, nondistended, no rebound or guarding, ostomy pink, intact, draining semisolid  Extremities: Grossly symmetric GENERAL SURGERY PROGRESS NOTE    SUBJECTIVE/24 HOUR EVENTS:   -Ostomy output decreased, will continue to monitor.  -Awaiting operative plan from primary surgeon.  -Discontinued BiPAP, no further needs or acute respiratory issues.  - Patient seen and examined at bedside  - Patient feels well, tolerating diet, on TPN, oob/ambi, pain controlled, denies f/c/n/v/d/sob    PHYSICAL EXAM:  General: Appears well, NAD  Neuro: AAOx3  CHEST: Clear to auscultation bilaterally,  CV: Regular rate and rhythm  Abdomen: soft, nontender, nondistended, no rebound or guarding, ostomy pink, intact, draining semisolid  Extremities: Grossly symmetric    V/S:  Vital Signs Last 24 Hrs  T(C): 37.6 (30 Jan 2020 07:00), Max: 37.6 (30 Jan 2020 07:00)  T(F): 99.7 (30 Jan 2020 07:00), Max: 99.7 (30 Jan 2020 07:00)  HR: 90 (30 Jan 2020 10:00) (73 - 93)  BP: 115/57 (30 Jan 2020 10:00) (91/52 - 145/78)  BP(mean): 82 (30 Jan 2020 10:00) (66 - 98)  RR: 24 (30 Jan 2020 10:00) (18 - 41)  SpO2: 100% (30 Jan 2020 10:00) (92% - 100%)    --------------------------------------------------------------------------------------------------  I/Os:    29 Jan 2020 07:01  -  30 Jan 2020 07:00  --------------------------------------------------------  IN:    fat emulsion (Fish Oil and Plant Based) 20% Infusion: 150 mL    Oral Fluid: 760 mL    Solution: 50 mL    TPN (Total Parenteral Nutrition): 1541 mL  Total IN: 2501 mL    OUT:    Ileostomy: 2500 mL    Voided: 2110 mL  Total OUT: 4610 mL    Total NET: -2109 mL      30 Jan 2020 07:01  -  30 Jan 2020 10:15  --------------------------------------------------------  IN:    Sodium Chloride 0.9% IV Bolus: 1000 mL    TPN (Total Parenteral Nutrition): 201 mL  Total IN: 1201 mL    OUT:  Total OUT: 0 mL    Total NET: 1201 mL        --------------------------------------------------------------------------------------------------  LABS:                        8.2    10.31 )-----------( 307      ( 30 Jan 2020 00:27 )             26.7     30 Jan 2020 00:27    135    |  96     |  19     ----------------------------<  110    4.5     |  32     |  0.45     Ca    8.4        30 Jan 2020 00:27  Phos  3.7       30 Jan 2020 00:27  Mg     1.8       30 Jan 2020 00:27    TPro  6.6    /  Alb  2.7    /  TBili  0.7    /  DBili  0.3    /  AST  22     /  ALT  37     /  AlkPhos  94     30 Jan 2020 00:27      CAPILLARY BLOOD GLUCOSE            LIVER FUNCTIONS - ( 30 Jan 2020 00:27 )  Alb: 2.7 g/dL / Pro: 6.6 g/dL / ALK PHOS: 94 U/L / ALT: 37 U/L / AST: 22 U/L / GGT: x               --------------------------------------------------------------------------------------------------  MEDICATIONS  (STANDING):  albuterol/ipratropium for Nebulization. 3 milliLiter(s) Nebulizer every 6 hours  buDESOnide    Inhalation Suspension 0.5 milliGRAM(s) Inhalation every 12 hours  chlorhexidine 2% Cloths 1 Application(s) Topical <User Schedule>  diphenoxylate/atropine 2 Tablet(s) Oral <User Schedule>  enoxaparin Injectable 40 milliGRAM(s) SubCutaneous daily  ergocalciferol 12098 Unit(s) Oral every week  fat emulsion (Fish Oil and Plant Based) 20% Infusion 12.5 mL/Hr (12.5 mL/Hr) IV Continuous <Continuous>  loperamide 16 milliGRAM(s) Oral <User Schedule>  octreotide  Injectable 100 MICROGram(s) SubCutaneous three times a day  opium Tincture 6 milliGRAM(s) Oral <User Schedule>  pantoprazole    Tablet 40 milliGRAM(s) Oral before breakfast  Parenteral Nutrition - Adult 1 Each (67 mL/Hr) TPN Continuous <Continuous>  psyllium Powder 1 Packet(s) Oral three times a day  tamsulosin 0.4 milliGRAM(s) Oral daily    MEDICATIONS  (PRN):  acetaminophen   Tablet .. 975 milliGRAM(s) Oral every 6 hours PRN Mild Pain (1 - 3)  diphenhydramine 2%/zinc acetate 0.1% Cream 1 Application(s) Topical two times a day PRN Itching  ondansetron Injectable 4 milliGRAM(s) IV Push every 6 hours PRN Nausea and/or Vomiting

## 2020-01-31 LAB
ACTH SER-ACNC: 17.5 PG/ML — SIGNIFICANT CHANGE UP (ref 7.2–63.3)
ALBUMIN SERPL ELPH-MCNC: 2.5 G/DL — LOW (ref 3.3–5)
ALP SERPL-CCNC: 85 U/L — SIGNIFICANT CHANGE UP (ref 40–120)
ALT FLD-CCNC: 27 U/L — SIGNIFICANT CHANGE UP (ref 10–45)
ANION GAP SERPL CALC-SCNC: 10 MMOL/L — SIGNIFICANT CHANGE UP (ref 5–17)
AST SERPL-CCNC: 12 U/L — SIGNIFICANT CHANGE UP (ref 10–40)
BILIRUB DIRECT SERPL-MCNC: 0.4 MG/DL — HIGH (ref 0–0.2)
BILIRUB INDIRECT FLD-MCNC: 0.4 MG/DL — SIGNIFICANT CHANGE UP (ref 0.2–1)
BILIRUB SERPL-MCNC: 0.8 MG/DL — SIGNIFICANT CHANGE UP (ref 0.2–1.2)
BUN SERPL-MCNC: 17 MG/DL — SIGNIFICANT CHANGE UP (ref 7–23)
CA-I BLD-SCNC: 1.13 MMOL/L — SIGNIFICANT CHANGE UP (ref 1.12–1.3)
CALCIUM SERPL-MCNC: 8.3 MG/DL — LOW (ref 8.4–10.5)
CHLORIDE SERPL-SCNC: 96 MMOL/L — SIGNIFICANT CHANGE UP (ref 96–108)
CO2 SERPL-SCNC: 29 MMOL/L — SIGNIFICANT CHANGE UP (ref 22–31)
CORTICOSTEROID BINDING GLOBULIN RESULT: 1.7 MG/DL — SIGNIFICANT CHANGE UP
CORTIS F/TOTAL MFR SERPL: 17 % — SIGNIFICANT CHANGE UP
CORTIS SERPL-MCNC: 12 UG/DL — SIGNIFICANT CHANGE UP
CORTISOL, FREE RESULT: 2.1 UG/DL — HIGH
CREAT SERPL-MCNC: 0.42 MG/DL — LOW (ref 0.5–1.3)
CULTURE RESULTS: SIGNIFICANT CHANGE UP
CULTURE RESULTS: SIGNIFICANT CHANGE UP
GLUCOSE SERPL-MCNC: 106 MG/DL — HIGH (ref 70–99)
HCT VFR BLD CALC: 23.6 % — LOW (ref 39–50)
HGB BLD-MCNC: 7.6 G/DL — LOW (ref 13–17)
MAGNESIUM SERPL-MCNC: 2 MG/DL — SIGNIFICANT CHANGE UP (ref 1.6–2.6)
MCHC RBC-ENTMCNC: 30.8 PG — SIGNIFICANT CHANGE UP (ref 27–34)
MCHC RBC-ENTMCNC: 32.2 GM/DL — SIGNIFICANT CHANGE UP (ref 32–36)
MCV RBC AUTO: 95.5 FL — SIGNIFICANT CHANGE UP (ref 80–100)
NRBC # BLD: 0 /100 WBCS — SIGNIFICANT CHANGE UP (ref 0–0)
PHOSPHATE SERPL-MCNC: 4.2 MG/DL — SIGNIFICANT CHANGE UP (ref 2.5–4.5)
PLATELET # BLD AUTO: 269 K/UL — SIGNIFICANT CHANGE UP (ref 150–400)
POTASSIUM SERPL-MCNC: 4 MMOL/L — SIGNIFICANT CHANGE UP (ref 3.5–5.3)
POTASSIUM SERPL-SCNC: 4 MMOL/L — SIGNIFICANT CHANGE UP (ref 3.5–5.3)
PREALB SERPL-MCNC: 18 MG/DL — LOW (ref 20–40)
PROT SERPL-MCNC: 6.1 G/DL — SIGNIFICANT CHANGE UP (ref 6–8.3)
RBC # BLD: 2.47 M/UL — LOW (ref 4.2–5.8)
RBC # FLD: 13.9 % — SIGNIFICANT CHANGE UP (ref 10.3–14.5)
SODIUM SERPL-SCNC: 135 MMOL/L — SIGNIFICANT CHANGE UP (ref 135–145)
SPECIMEN SOURCE: SIGNIFICANT CHANGE UP
SPECIMEN SOURCE: SIGNIFICANT CHANGE UP
TRIGL SERPL-MCNC: 51 MG/DL — SIGNIFICANT CHANGE UP (ref 10–149)
WBC # BLD: 10.86 K/UL — HIGH (ref 3.8–10.5)
WBC # FLD AUTO: 10.86 K/UL — HIGH (ref 3.8–10.5)

## 2020-01-31 PROCEDURE — 99232 SBSQ HOSP IP/OBS MODERATE 35: CPT

## 2020-01-31 PROCEDURE — 99233 SBSQ HOSP IP/OBS HIGH 50: CPT

## 2020-01-31 PROCEDURE — 73610 X-RAY EXAM OF ANKLE: CPT | Mod: 26,LT

## 2020-01-31 PROCEDURE — 71045 X-RAY EXAM CHEST 1 VIEW: CPT | Mod: 26

## 2020-01-31 RX ORDER — ELECTROLYTE SOLUTION,INJ
1 VIAL (ML) INTRAVENOUS
Refills: 0 | Status: DISCONTINUED | OUTPATIENT
Start: 2020-01-31 | End: 2020-01-31

## 2020-01-31 RX ORDER — SODIUM CHLORIDE 9 MG/ML
1000 INJECTION, SOLUTION INTRAVENOUS ONCE
Refills: 0 | Status: COMPLETED | OUTPATIENT
Start: 2020-01-31 | End: 2020-01-31

## 2020-01-31 RX ORDER — I.V. FAT EMULSION 20 G/100ML
20.8 EMULSION INTRAVENOUS
Qty: 50 | Refills: 0 | Status: DISCONTINUED | OUTPATIENT
Start: 2020-01-31 | End: 2020-01-31

## 2020-01-31 RX ORDER — I.V. FAT EMULSION 20 G/100ML
20.8 EMULSION INTRAVENOUS
Qty: 50 | Refills: 0 | Status: DISCONTINUED | OUTPATIENT
Start: 2020-01-31 | End: 2020-02-01

## 2020-01-31 RX ORDER — DESMOPRESSIN ACETATE 0.1 MG/1
4 TABLET ORAL ONCE
Refills: 0 | Status: COMPLETED | OUTPATIENT
Start: 2020-01-31 | End: 2020-01-31

## 2020-01-31 RX ADMIN — Medication 2 TABLET(S): at 17:46

## 2020-01-31 RX ADMIN — Medication 975 MILLIGRAM(S): at 00:15

## 2020-01-31 RX ADMIN — Medication 1 PACKET(S): at 15:24

## 2020-01-31 RX ADMIN — Medication 1 PACKET(S): at 06:28

## 2020-01-31 RX ADMIN — PANTOPRAZOLE SODIUM 40 MILLIGRAM(S): 20 TABLET, DELAYED RELEASE ORAL at 08:31

## 2020-01-31 RX ADMIN — DESMOPRESSIN ACETATE 4 MICROGRAM(S): 0.1 TABLET ORAL at 11:35

## 2020-01-31 RX ADMIN — MORPHINE 6 MILLIGRAM(S): 10 SOLUTION ORAL at 08:31

## 2020-01-31 RX ADMIN — Medication 2 TABLET(S): at 22:16

## 2020-01-31 RX ADMIN — Medication 975 MILLIGRAM(S): at 06:27

## 2020-01-31 RX ADMIN — MORPHINE 6 MILLIGRAM(S): 10 SOLUTION ORAL at 22:18

## 2020-01-31 RX ADMIN — TAMSULOSIN HYDROCHLORIDE 0.4 MILLIGRAM(S): 0.4 CAPSULE ORAL at 11:58

## 2020-01-31 RX ADMIN — Medication 2 TABLET(S): at 12:02

## 2020-01-31 RX ADMIN — CHLORHEXIDINE GLUCONATE 1 APPLICATION(S): 213 SOLUTION TOPICAL at 06:28

## 2020-01-31 RX ADMIN — Medication 16 MILLIGRAM(S): at 08:33

## 2020-01-31 RX ADMIN — Medication 0.5 MILLIGRAM(S): at 18:54

## 2020-01-31 RX ADMIN — Medication 16 MILLIGRAM(S): at 12:02

## 2020-01-31 RX ADMIN — Medication 16 MILLIGRAM(S): at 17:47

## 2020-01-31 RX ADMIN — Medication 975 MILLIGRAM(S): at 06:28

## 2020-01-31 RX ADMIN — Medication 3 MILLILITER(S): at 18:55

## 2020-01-31 RX ADMIN — Medication 3 MILLILITER(S): at 05:34

## 2020-01-31 RX ADMIN — Medication 975 MILLIGRAM(S): at 22:24

## 2020-01-31 RX ADMIN — SODIUM CHLORIDE 1000 MILLILITER(S): 9 INJECTION, SOLUTION INTRAVENOUS at 06:27

## 2020-01-31 RX ADMIN — Medication 16 MILLIGRAM(S): at 22:17

## 2020-01-31 RX ADMIN — I.V. FAT EMULSION 20.8 ML/HR: 20 EMULSION INTRAVENOUS at 16:38

## 2020-01-31 RX ADMIN — MORPHINE 6 MILLIGRAM(S): 10 SOLUTION ORAL at 17:46

## 2020-01-31 RX ADMIN — Medication 0.5 MILLIGRAM(S): at 05:34

## 2020-01-31 RX ADMIN — ENOXAPARIN SODIUM 40 MILLIGRAM(S): 100 INJECTION SUBCUTANEOUS at 11:57

## 2020-01-31 RX ADMIN — Medication 2 TABLET(S): at 08:31

## 2020-01-31 RX ADMIN — OCTREOTIDE ACETATE 100 MICROGRAM(S): 200 INJECTION, SOLUTION INTRAVENOUS; SUBCUTANEOUS at 16:38

## 2020-01-31 RX ADMIN — Medication 975 MILLIGRAM(S): at 22:54

## 2020-01-31 RX ADMIN — MORPHINE 6 MILLIGRAM(S): 10 SOLUTION ORAL at 12:02

## 2020-01-31 RX ADMIN — OCTREOTIDE ACETATE 100 MICROGRAM(S): 200 INJECTION, SOLUTION INTRAVENOUS; SUBCUTANEOUS at 11:34

## 2020-01-31 RX ADMIN — Medication 1 EACH: at 16:38

## 2020-01-31 RX ADMIN — Medication 1 PACKET(S): at 22:17

## 2020-01-31 NOTE — CHART NOTE - NSCHARTNOTEFT_GEN_A_CORE
Brief Note. TPN Team.    Chart reviewed, events noted.     Nutrition Status: Malnutrition. Pt with 150 cm small bowel remaining after GI surgery x 3. TPN initiated 12/14. Pt has been tolerating  po diet since 12/25. TPN discontinued as of 1/15; resumed at half goal 1/21. Per 8ICU team, pt is planned for revision in early February.    Plan to increase TPN back to full goal to optimize nutrition prior to OR. Total volume increased to 2L; NaCl increased to 80mEq.    Parenteral Nutrition (as of 1/31): 2L to infuse at 83ml/hr (120Gm amino acids, 210Gm dextrose, 50Gm SMOF lipids); to provide: 1694cal (31Kcal/Kg and 2.2Gm protein/Kg dosing wt 54.2Kg); with 10ml MVI and 3ml MTE-5.     Non-Protein Calories: 1214kcal/day (22cal/Kg)  Dextrose Infusion Rate: 2.7 mg/Kg/min  Lipid Infusion Rate: 0.92 Gm/Kg/day; 0.08 Gm/Kg/hr    Diet : Mechanical Soft. 2 servings Ensure Enlive. 1 serving Prosource.     Ileostomy output: 2500ml    LABS:   01-31 @ 07:40:  Prealbumin 18<L>,   01-31 @ 04:10: Sodium 135, Potassium 4.0, Chloride 96, Calcium 8.3<L>, Magnesium 2.0, Phosphorus 4.2, BUN 17, Creatinine 0.42<L>, <H>, Alk Phos 85, ALT/SGPT 27, AST/SGOT 12, Total Protein 6.1, Albumin 2.5<L>, Total Bilirubin 0.8, Direct Bilirubin 0.4<H>, Hemoglobin 7.6<L>, Hematocrit 23.6<L>       Interventions: continue po diet as above; TPN increased to goal for pre-op nutrition optimization    RD remains available upon request and will follow up per protocol.    Sowmya Graham, MS RD CDN [facility], Pager # [phone]

## 2020-01-31 NOTE — PROGRESS NOTE ADULT - SUBJECTIVE AND OBJECTIVE BOX
[facility] NUTRITION SUPPORT / TPN -- FOLLOW UP NOTE  --------------------------------------------------------------------------------    24 hour events/subjective:          Diet:  Diet, Mechanical Soft:   No Carb Prosource (1pkg = 15gms Protein)     Qty per Day:  1  Supplement Feeding Modality:  Oral  Ensure Enlive Cans or Servings Per Day:  2       Frequency:  Daily (01-24-20 @ 11:32)      Appetite: [  ]Poor [  ]Adequate [  ]Good  Caloric intake:  [   ]  Adequate   [   ] Inadequate    ROS: General/ GI see HPI  all other systems negative  pt unable to offer    ALLERGIES & MEDICATIONS  --------------------------------------------------------------------------------  ALLERGIES  Allergies    IV Contrast (Hives)    Intolerances        INTOLERANCES    STANDING INPATIENT MEDICATIONS    albuterol/ipratropium for Nebulization. 3 milliLiter(s) Nebulizer every 6 hours  buDESOnide    Inhalation Suspension 0.5 milliGRAM(s) Inhalation every 12 hours  chlorhexidine 2% Cloths 1 Application(s) Topical <User Schedule>  diphenoxylate/atropine 2 Tablet(s) Oral <User Schedule>  enoxaparin Injectable 40 milliGRAM(s) SubCutaneous daily  ergocalciferol 97871 Unit(s) Oral every week  fat emulsion (Fish Oil and Plant Based) 20% Infusion 12.5 mL/Hr IV Continuous <Continuous>  loperamide 16 milliGRAM(s) Oral <User Schedule>  octreotide  Injectable 100 MICROGram(s) SubCutaneous three times a day  opium Tincture 6 milliGRAM(s) Oral <User Schedule>  pantoprazole    Tablet 40 milliGRAM(s) Oral before breakfast  Parenteral Nutrition - Adult 1 Each TPN Continuous <Continuous>  psyllium Powder 1 Packet(s) Oral three times a day  tamsulosin 0.4 milliGRAM(s) Oral daily      PRN INPATIENT MEDICATION  acetaminophen   Tablet .. 975 milliGRAM(s) Oral every 6 hours PRN  diphenhydramine 2%/zinc acetate 0.1% Cream 1 Application(s) Topical two times a day PRN  ondansetron Injectable 4 milliGRAM(s) IV Push every 6 hours PRN        VITALS/PHYSICAL EXAM  --------------------------------------------------------------------------------  T(C): 36.9 (01-31-20 @ 07:00), Max: 37.4 (01-30-20 @ 11:00)  HR: 73 (01-31-20 @ 08:00) (73 - 108)  BP: 105/56 (01-31-20 @ 08:00) (82/52 - 136/80)  RR: 33 (01-31-20 @ 08:00) (18 - 37)  SpO2: 99% (01-31-20 @ 08:00) (91% - 100%)  Wt(kg): --        01-30-20 @ 07:01  -  01-31-20 @ 07:00  --------------------------------------------------------  IN: 2811 mL / OUT: 4950 mL / NET: -2139 mL    01-31-20 @ 07:01  -  01-31-20 @ 08:50  --------------------------------------------------------  IN: 67 mL / OUT: 0 mL / NET: 67 mL            LABS/ CULTURES/ RADIOLOGY:              7.6    10.86 >-----------<  269      [01-31-20 @ 04:10]              23.6     135  |  96  |  17  ----------------------------<  106      [01-31-20 @ 04:10]  4.0   |  29  |  0.42        Ca     8.3     [01-31-20 @ 04:10]      iCa    1.13     [01-31 @ 04:15]      Mg     2.0     [01-31-20 @ 04:10]      Phos  4.2     [01-31-20 @ 04:10]    TPro  6.1  /  Alb  2.5  /  TBili  0.8  /  DBili  0.4  /  AST  12  /  ALT  27  /  AlkPhos  85  [01-31-20 @ 04:10]        Prealbumin, Serum: 18 mg/dL (01-31-20 @ 07:40)  Prealbumin, Serum: 19 mg/dL (01-27-20 @ 04:52)  Prealbumin, Serum: 16 mg/dL (01-24-20 @ 02:53)  Prealbumin, Serum: 13 mg/dL (01-21-20 @ 02:00)  Prealbumin, Serum: 15 mg/dL (01-15-20 @ 09:01)      Triglycerides, Serum: 51 mg/dL (01.31.20 @ 04:10) [facility] NUTRITION SUPPORT / TPN -- FOLLOW UP NOTE  --------------------------------------------------------------------------------    24 hour events/subjective:  No c/o N/V     tolerating diet w/o pain  Feeling good this am-  slept well, but was OOB ambulating yesterday pm  Pt c/o pain Lt ankle/ foot pain- worse when walking, a little swelling, no funny sensations  Overall pt feels less swollen  No leukocytosis  Acth stim test pending   Continued high ostomy output w/ 2500mL over 24 hours       continue lomotil, imodium, octreotide, tincture of opium at max doses  increased Urine Output        Urine Output 2450mL  No cough/ cp/ palp/dyspnea/ sob  No f/c/s      Diet:  Diet, Mechanical Soft:   No Carb Prosource (1pkg = 15gms Protein)     Qty per Day:  1  Supplement Feeding Modality:  Oral  Ensure Enlive Cans or Servings Per Day:  2       Frequency:  Daily (01-24-20 @ 11:32)      Appetite: [  ]Poor [  ]Adequate [ x ]Good  Caloric intake:  [ x  ]  Adequate   [   ] Inadequate    ROS: General/ GI see HPI  all other systems negative      ALLERGIES & MEDICATIONS  --------------------------------------------------------------------------------  ALLERGIES  IV Contrast (Hives)    STANDING INPATIENT MEDICATIONS    albuterol/ipratropium for Nebulization. 3 milliLiter(s) Nebulizer every 6 hours  buDESOnide    Inhalation Suspension 0.5 milliGRAM(s) Inhalation every 12 hours  chlorhexidine 2% Cloths 1 Application(s) Topical <User Schedule>  diphenoxylate/atropine 2 Tablet(s) Oral <User Schedule>  enoxaparin Injectable 40 milliGRAM(s) SubCutaneous daily  ergocalciferol 17852 Unit(s) Oral every week  fat emulsion (Fish Oil and Plant Based) 20% Infusion 12.5 mL/Hr IV Continuous <Continuous>  loperamide 16 milliGRAM(s) Oral <User Schedule>  octreotide  Injectable 100 MICROGram(s) SubCutaneous three times a day  opium Tincture 6 milliGRAM(s) Oral <User Schedule>  pantoprazole    Tablet 40 milliGRAM(s) Oral before breakfast  Parenteral Nutrition - Adult 1 Each TPN Continuous <Continuous>  psyllium Powder 1 Packet(s) Oral three times a day  tamsulosin 0.4 milliGRAM(s) Oral daily      PRN INPATIENT MEDICATION  acetaminophen   Tablet .. 975 milliGRAM(s) Oral every 6 hours PRN  diphenhydramine 2%/zinc acetate 0.1% Cream 1 Application(s) Topical two times a day PRN  ondansetron Injectable 4 milliGRAM(s) IV Push every 6 hours PRN        VITALS/PHYSICAL EXAM  --------------------------------------------------------------------------------  T(C): 36.9 (01-31-20 @ 07:00), Max: 37.4 (01-30-20 @ 11:00)  HR: 73 (01-31-20 @ 08:00) (73 - 108)  BP: 105/56 (01-31-20 @ 08:00) (82/52 - 136/80)  RR: 33 (01-31-20 @ 08:00) (18 - 37)  SpO2: 99% (01-31-20 @ 08:00) (91% - 100%)  Wt(kg): --        01-30-20 @ 07:01  -  01-31-20 @ 07:00  --------------------------------------------------------  IN: 2811 mL / OUT: 4950 mL / NET: -2139 mL    01-31-20 @ 07:01  -  01-31-20 @ 08:50  --------------------------------------------------------  IN: 67 mL / OUT: 0 mL / NET: 67 mL      PHYSICAL EXAM:  --------------------------------------------------------------------------------  	Gen: guarded but stable, A&Ox3, NC O2  	HEENT: NC/AT, PERRL, supple neck, trachea midline, mucosa moist              Chest: CTA; Lt chest w/ dressing c/d/i  	GI: (+) BS, softly distended, non tender                    midline dressing c/d/i w/o drainage                   (+)ostomy pink viable- thick loose BM green/ tan colored              MSK: FROM x4, no contractures nor deformities  	Vascular: Equally Warm, (+)BLE mild edema improving,  no clubbing, cyanosis,                       BUE w/o edema, clubbing & cyanosis                       RUE PICC w/o sx infection   	Neuro: No focal deficits, intact sensation, weakened strength BLE>BUE  	Psych: Normal affect and mood        LABS/ CULTURES/ RADIOLOGY:              7.6    10.86 >-----------<  269      [01-31-20 @ 04:10]              23.6     135  |  96  |  17  ----------------------------<  106      [01-31-20 @ 04:10]  4.0   |  29  |  0.42        Ca     8.3     [01-31-20 @ 04:10]      iCa    1.13     [01-31 @ 04:15]      Mg     2.0     [01-31-20 @ 04:10]      Phos  4.2     [01-31-20 @ 04:10]    TPro  6.1  /  Alb  2.5  /  TBili  0.8  /  DBili  0.4  /  AST  12  /  ALT  27  /  AlkPhos  85  [01-31-20 @ 04:10]        Prealbumin, Serum: 18 mg/dL (01-31-20 @ 07:40)  Prealbumin, Serum: 19 mg/dL (01-27-20 @ 04:52)  Prealbumin, Serum: 16 mg/dL (01-24-20 @ 02:53)  Prealbumin, Serum: 13 mg/dL (01-21-20 @ 02:00)  Prealbumin, Serum: 15 mg/dL (01-15-20 @ 09:01)      Triglycerides, Serum: 51 mg/dL (01.31.20 @ 04:10)  Triglycerides, Serum: 83 mg/dL (01.27.20 @ 00:14)  Triglycerides, Serum: 78 mg/dL (01.24.20 @ 00:18)  Triglycerides, Serum: 76 mg/dL (01.21.20 @ 00:26)  Triglycerides, Serum: 51 mg/dL (01.15.20 @ 01:44)  Triglycerides, Serum: 60 mg/dL (01.14.20 @ 03:31)

## 2020-01-31 NOTE — PROGRESS NOTE ADULT - ATTENDING COMMENTS
pt seen and examined.  agree with above.  cont supportive care per SICU  d/w pt and SICU in detail   tentatively on OR schedule for Tues 2/4 for xlap, closure of end ileostomy/MF  appreciate cards f/u   ?pt benefit from Tx of prbc prior to OR for h/h 7.6/23.6

## 2020-01-31 NOTE — PROGRESS NOTE ADULT - SUBJECTIVE AND OBJECTIVE BOX
HISTORY  75y Male past medical history of COPD and EtOH dependence who presented on 12/6/2019 with abdominal pain, nausea, hematemesis, and poor PO intake for ~2-3 days. In the ED, he was found to be hypotensive & tachycardic. Labs revealed an CHI and lactic acidosis. Imaging revealed a high grade SBO in the RLQ on CT scan. Hospital course is as follows:  12/06 - s/p exploratory laparotomy, lysis of adhesions, decompression of bowel via enterotomy w/ primary repair, and Abthera VAC placement as the distal 50% of the bowel appeared dusky but was still viable, admitted to SICU post-operatively as he was on vasopressor support and was left intubated  12/08 - s/p re-exploration, proximal 165 cm of small bowel appeared pink & viable but beyond that had patchy areas of ischemia so decision was made to give the bowel more time to demarcate before resecting in order to preserve as much small bowel as possible so Abthera VAC was replaced  12/09 - s/p re-exploration, small bowel resection of 150 cm (150 cm remaining), ileocecetomy, end ileostomy, mucous fistula, and abdominal closure  12/11 - extubated to BiPAP, noted to be in SVT that was rate controlled w/ metoprolol  12/14 - started on TPN  12/15 - noted to have spontaneous left pneumothorax s/p left pigtail catheter placement, noted to be in SVT again so amiodarone started, high ileostomy output noted so concern for short bowel syndrome  12/16 - amiodarone discontinued, started on beta blocker with metoprolol  12/18 - started Lomotil and ileostomy repletions with IV fluids  12/19 - started Imodium, left pigtail catheter was placed to water seal but pneumothorax noted on follow-up CXR so placed back to suction  12/20 - started tincture of opium, concern for aspiration so changed diet from regular to dysphagia 1 pureed with nectar-thickened liquids  12/22 - left pigtail catheter placed to water seal with no pneumothorax noted on follow-up CXR, CT chest with moderate left pleural effusion not being drained by pigtail catheter  12/23 - 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  12/24 - left pigtail catheter discontinued  12/28 - started Ancef for erythematous midline wound  12/29 - beta blocker discontinued for intermittent episodes of hypotension  12/30 - left pigtail catheter placement by IR with drainage of serous transudative fluid  01/02 - FEES demonstrating penetration with thin liquids so patient kept on dysphagia 1 pureed with nectar-thickened liquids  01/07 - Transferred to floors  01/10 - s/p left VATS w/ PleurX catheter placement  01/11 - evaluated by speech & swallow, advanced diet to mechanical soft with thin liquids  01/12 - PleurX catheter placed to water seal with on pneumothorax on follow-up CXR  01/13 - RRT for hypoxemia, placed on BiPAP, PleurX catheter placed back to suction, started vancomycin & Zosyn for possible HCAP  01/14 - noted to have minimal output from PleurX catheter, lung ultrasound with large left pleural effusion concerning for hemothorax, multiple episodes of bradycardia secondary to hypoxemia, remains on BiPAP  01/15 - worsening respiratory distress requiring intubation, hypotensive requiring vasopressor support, CT chest with large left hemothorax w/ trace pneumothorax & extensive subcutaneous emphysema so taken to OR emergently for left VATS, evacuation fo 2 L of clot, and placement of two left chest tubes  01/16 - extubated, weaned off vasopressor support  01/17 - two left chest tubes placed to water seal with no pneumothorax noted on follow-up CXR      24 HOUR EVENTS:  - 1 L bolus given  - NAEO    SUBJECTIVE/ROS:  [x] A ten-point review of systems was otherwise negative except as noted.  [ ] Due to altered mental status/intubation, subjective information were not able to be obtained from the patient. History was obtained, to the extent possible, from review of the chart and collateral sources of information.      NEURO  Exam: AOx3  Meds: acetaminophen   Tablet .. 975 milliGRAM(s) Oral every 6 hours PRN Mild Pain (1 - 3)  ondansetron Injectable 4 milliGRAM(s) IV Push every 6 hours PRN Nausea and/or Vomiting  opium Tincture 6 milliGRAM(s) Oral <User Schedule>    [x] Adequacy of sedation and pain control has been assessed and adjusted      RESPIRATORY  RR: 28 (01-30-20 @ 22:00) (19 - 33)  SpO2: 97% (01-30-20 @ 23:39) (91% - 100%)  Wt(kg): --  Exam: unlabored, clear to auscultation bilaterally  Mechanical Ventilation:     [ ] Extubation Readiness Assessed  Meds: albuterol/ipratropium for Nebulization. 3 milliLiter(s) Nebulizer every 6 hours  buDESOnide    Inhalation Suspension 0.5 milliGRAM(s) Inhalation every 12 hours        CARDIOVASCULAR  HR: 93 (01-30-20 @ 23:39) (76 - 108)  BP: 101/56 (01-30-20 @ 22:00) (91/51 - 136/80)  BP(mean): 74 (01-30-20 @ 22:00) (64 - 109)  ABP: --  ABP(mean): --  Wt(kg): --  CVP(cm H2O): --  VBG - ( 29 Jan 2020 01:05 )  pH: 7.37  /  pCO2: 65    /  pO2: 45    / HCO3: 37    / Base Excess: 10.4  /  SaO2: 78     Lactate: 0.6      Exam: RRR  Cardiac Rhythm: NSR  Perfusion     [ x]Adequate   [ ]Inadequate  Mentation   [ x]Normal       [ ]Reduced  Extremities  [ x]Warm         [ ]Cool  Volume Status [ ]Hypervolemic [ x]Euvolemic [ ]Hypovolemic  Meds: tamsulosin 0.4 milliGRAM(s) Oral daily        GI/NUTRITION  Exam:  Diet:  Meds: diphenoxylate/atropine 2 Tablet(s) Oral <User Schedule>  loperamide 16 milliGRAM(s) Oral <User Schedule>  pantoprazole    Tablet 40 milliGRAM(s) Oral before breakfast  psyllium Powder 1 Packet(s) Oral three times a day      GENITOURINARY  I&O's Detail    01-29 @ 07:01  -  01-30 @ 07:00  --------------------------------------------------------  IN:    fat emulsion (Fish Oil and Plant Based) 20% Infusion: 150 mL    Oral Fluid: 760 mL    Solution: 50 mL    TPN (Total Parenteral Nutrition): 1541 mL  Total IN: 2501 mL    OUT:    Ileostomy: 2500 mL    Voided: 2110 mL  Total OUT: 4610 mL    Total NET: -2109 mL      01-30 @ 07:01  -  01-31 @ 00:57  --------------------------------------------------------  IN:    fat emulsion (Fish Oil and Plant Based) 20% Infusion: 25 mL    Oral Fluid: 120 mL    Sodium Chloride 0.9% IV Bolus: 1000 mL    TPN (Total Parenteral Nutrition): 737 mL  Total IN: 1882 mL    OUT:    Ileostomy: 1300 mL    Voided: 1950 mL  Total OUT: 3250 mL    Total NET: -1368 mL          01-30    135  |  96  |  19  ----------------------------<  110<H>  4.5   |  32<H>  |  0.45<L>    Ca    8.4      30 Jan 2020 00:27  Phos  3.7     01-30  Mg     1.8     01-30    TPro  6.6  /  Alb  2.7<L>  /  TBili  0.7  /  DBili  0.3<H>  /  AST  22  /  ALT  37  /  AlkPhos  94  01-30    [ ] Martinez catheter, indication: N/A  Meds: ergocalciferol 02635 Unit(s) Oral every week  fat emulsion (Fish Oil and Plant Based) 20% Infusion 12.5 mL/Hr IV Continuous <Continuous>  Parenteral Nutrition - Adult 1 Each TPN Continuous <Continuous>        HEMATOLOGIC  Meds: enoxaparin Injectable 40 milliGRAM(s) SubCutaneous daily    [x] VTE Prophylaxis                        8.2    10.31 )-----------( 307      ( 30 Jan 2020 00:27 )             26.7       Transfusion     [ ] PRBC   [ ] Platelets   [ ] FFP   [ ] Cryoprecipitate      INFECTIOUS DISEASES  T(C): 37 (01-30-20 @ 15:00), Max: 37.6 (01-30-20 @ 07:00)  Wt(kg): --    Recent Cultures:  Specimen Source: .Blood Blood, 01-26 @ 03:52; Results   No growth to date.; Gram Stain: --; Organism: --    Meds:       ENDOCRINE  Capillary Blood Glucose    Meds: octreotide  Injectable 100 MICROGram(s) SubCutaneous three times a day      OTHER MEDICATIONS:  chlorhexidine 2% Cloths 1 Application(s) Topical <User Schedule>  diphenhydramine 2%/zinc acetate 0.1% Cream 1 Application(s) Topical two times a day PRN      CODE STATUS:     IMAGING:

## 2020-01-31 NOTE — PROGRESS NOTE ADULT - SUBJECTIVE AND OBJECTIVE BOX
Subjective: Patient seen and examined. No new events except as noted.   remains in ICU   feels ok   no cp or sob   s/p 1 liter bolus   has been hypotensive in the 90s       REVIEW OF SYSTEMS:    CONSTITUTIONAL:+ weakness, fevers or chills  EYES/ENT: No visual changes;  No vertigo or throat pain   NECK: No pain or stiffness  RESPIRATORY: No cough, wheezing, hemoptysis; No shortness of breath  CARDIOVASCULAR: No chest pain or palpitations  GASTROINTESTINAL: No abdominal or epigastric pain. No nausea, vomiting, or hematemesis; No diarrhea or constipation. No melena or hematochezia.  GENITOURINARY: No dysuria, frequency or hematuria  NEUROLOGICAL: No numbness or weakness  SKIN: No itching, burning, rashes, or lesions   All other review of systems is negative unless indicated above.    MEDICATIONS:  MEDICATIONS  (STANDING):  albuterol/ipratropium for Nebulization. 3 milliLiter(s) Nebulizer every 6 hours  buDESOnide    Inhalation Suspension 0.5 milliGRAM(s) Inhalation every 12 hours  chlorhexidine 2% Cloths 1 Application(s) Topical <User Schedule>  diphenoxylate/atropine 2 Tablet(s) Oral <User Schedule>  enoxaparin Injectable 40 milliGRAM(s) SubCutaneous daily  ergocalciferol 87342 Unit(s) Oral every week  fat emulsion (Fish Oil and Plant Based) 20% Infusion 20.8 mL/Hr (20.8 mL/Hr) IV Continuous <Continuous>  loperamide 16 milliGRAM(s) Oral <User Schedule>  octreotide  Injectable 100 MICROGram(s) SubCutaneous three times a day  opium Tincture 6 milliGRAM(s) Oral <User Schedule>  pantoprazole    Tablet 40 milliGRAM(s) Oral before breakfast  Parenteral Nutrition - Adult 1 Each (67 mL/Hr) TPN Continuous <Continuous>  Parenteral Nutrition - Adult 1 Each (83 mL/Hr) TPN Continuous <Continuous>  psyllium Powder 1 Packet(s) Oral three times a day  tamsulosin 0.4 milliGRAM(s) Oral daily      PHYSICAL EXAM:  T(C): 37 (01-31-20 @ 11:00), Max: 37 (01-30-20 @ 15:00)  HR: 78 (01-31-20 @ 11:00) (73 - 108)  BP: 111/56 (01-31-20 @ 11:00) (82/52 - 134/97)  RR: 19 (01-31-20 @ 11:00) (18 - 37)  SpO2: 97% (01-31-20 @ 11:00) (91% - 100%)  Wt(kg): --  I&O's Summary    30 Jan 2020 07:01  -  31 Jan 2020 07:00  --------------------------------------------------------  IN: 2811 mL / OUT: 4950 mL / NET: -2139 mL    31 Jan 2020 07:01  -  31 Jan 2020 12:19  --------------------------------------------------------  IN: 268 mL / OUT: 400 mL / NET: -132 mL          Appearance: Normal	  HEENT:   Normal oral mucosa, PERRL, EOMI	  Lymphatic: No lymphadenopathy , no edema  Cardiovascular: Normal S1 S2, No JVD, No murmurs , Peripheral pulses palpable 2+ bilaterally  Respiratory: Lungs clear to auscultation, normal effort 	  Gastrointestinal:  Soft, Non-tender, + Ileostomy   Skin: No rashes, No ecchymoses, No cyanosis, warm to touch  Musculoskeletal: Normal range of motion, normal strength  Psychiatry:  Mood & affect appropriate  Ext: No edema  +rosas and Ileostomy bag     LABS:    CARDIAC MARKERS:                                7.6    10.86 )-----------( 269      ( 31 Jan 2020 04:10 )             23.6     01-31    135  |  96  |  17  ----------------------------<  106<H>  4.0   |  29  |  0.42<L>    Ca    8.3<L>      31 Jan 2020 04:10  Phos  4.2     01-31  Mg     2.0     01-31    TPro  6.1  /  Alb  2.5<L>  /  TBili  0.8  /  DBili  0.4<H>  /  AST  12  /  ALT  27  /  AlkPhos  85  01-31    proBNP:   Lipid Profile:   HgA1c:   TSH:             TELEMETRY: 	    ECG:  	  RADIOLOGY:   DIAGNOSTIC TESTING:  [ ] Echocardiogram:  [ ]  Catheterization:  [ ] Stress Test:    OTHER:

## 2020-01-31 NOTE — PROGRESS NOTE ADULT - ASSESSMENT
A/P: 75 year old male w/ PMH of COPD and EtOH dependence with PSH/o appy, prostate surgery, & spine surgery  septic shock and high grade SBO s/p exploratory laparotomy, lysis of adhesions, decompression of bowel via enterotomy w/ primary repair, and Abthera VAC placement on 12/6; s/p take down of Abthera, washout and re-application of the Abthera vac on 12/8. Now s/p SBR and end ileostomy on 12/10.   TPN consulted to assist w/ management of pt's nutrition in pt w/ prolonged hospital course now tolerating diet but has high Ileostomy output.  Pt s/p Lt Chest Pigtail for effusion in IR with persistent high output on 1/10/2020 pt had VATs & placement of Left PleurX catheter. Pt op pt developed hemothorax and Respiratory Distress.  Pt is s/p Lt chest Evacuation of 2L blood w/ placement of CT x2 on 1/15/2020 for Lt Pleural Effusion that's resolving and doing well after CT removed and resolved Rt PNA vs Pleural Effusion.     Pt remains in SICU w/ High Ostomy & Urine Output,   Pt w/ improving severe Protein-Calorie Malnutrition-        Short Gut Syndrome w/Malabsorption- increasing back to GOAL TPN so as to Nutritionally optimize patient for potential OR              questioning absorption with stagnant Prealbumin and low TG               Pt unable to match nutritional GOALs w/ oral nutrition        TPN GOAL:    Amino Acids 120g, Dextrose 210g, and Lipids 50g in 1600mL with 3mL MTE & 10mL MVI        Pt tolerating Soft Mechanical w/ Ensure & Prosource supplements             -Concern for adrenal insufficiency (1/26 AM cortisol = 5.9)        ACTH Stim test pending        Consider ACTH level to have to compare  - HypoCa- improving w/ increased Ca in TPN to 14mEq- continue to monitor        Improved Ca levels helpful for BP & muscle contraction  -HypoPhos- improving w/-increased NaPhos & will continue to monitor   -Fecal fat testing suggestive of decreased absorption of fat -         TPN w/ MVI to compensate for low Vit E & A        Vit D levels low- being supplemented w/Ergocalciferol         Vit K INR/ PT near normal suggestive that it is being absorbed   -Edema greatly improved and Fluid overload resolved          w/ recent h/o HypoTN responded well to IVF           Tolerating Increased total vol TPN of 1600mL           Increased Osm with Increased AA & Dextrose, keep NaPhos, and NaCl same in TPN  -Increased Urine Output- will continue to monitor  -Strict Intake and Output -            Increased ostomy output- increased TPN volume, continue to monitor           Continue to monitor while on octreotide, lomotil, tincture of opium, & imodium  -Leukocytosis- resolved- observing off abx  -Hyperglycemia-improving      Fingersticks & ISS coverage - as per SICU  -To continue monitoring of nutrition - Weights three times a week; Daily CMP, Mg, Ionized Ca, Phosphorus,        and Weekly Triglycerides and Pre-albumin  Continue as per SICU/Surgery, will follow with you, D/w primary team    Andreina Hubbard PA-C  TPN team, pager [phone]  D/w Ana M Chacko & MU Siegel A/P: 75 year old male w/ PMH of COPD and EtOH dependence with PSH/o appy, prostate surgery, & spine surgery  septic shock and high grade SBO s/p exploratory laparotomy, lysis of adhesions, decompression of bowel via enterotomy w/ primary repair, and Abthera VAC placement on 12/6; s/p take down of Abthera, washout and re-application of the Abthera vac on 12/8. Now s/p SBR and end ileostomy on 12/10.   TPN consulted to assist w/ management of pt's nutrition in pt w/ prolonged hospital course now tolerating diet but has high Ileostomy output.  Pt s/p Lt Chest Pigtail for effusion in IR with persistent high output on 1/10/2020 pt had VATs & placement of Left PleurX catheter. Pt op pt developed hemothorax and Respiratory Distress.  Pt is s/p Lt chest Evacuation of 2L blood w/ placement of CT x2 on 1/15/2020 for Lt Pleural Effusion that's resolving and doing well after CT removed and resolved Rt PNA vs Pleural Effusion.     Pt remains in SICU w/ High Ostomy & Urine Output,   Pt w/ improving severe Protein-Calorie Malnutrition-        Short Gut Syndrome w/Malabsorption- increasing back to GOAL TPN so as to Nutritionally optimize patient for potential OR              questioning absorption with stagnant Prealbumin and low TG               Pt unable to match nutritional GOALs w/ oral nutrition        TPN GOAL:    Amino Acids 120g, Dextrose 210g, and Lipids 50g in 2000mL with 3mL MTE & 10mL MVI        Pt tolerating Soft Mechanical w/ Ensure & Prosource supplements             -Concern for adrenal insufficiency (1/26 AM cortisol = 5.9)        ACTH Stim test pending        Consider ACTH level to have to compare  - HypoCa- improving w/ increased Ca in TPN to 14mEq- continue to monitor        Improved Ca levels helpful for BP & muscle contraction  -HypoPhos- improving w/-increased NaPhos & will continue to monitor   -Fecal fat testing suggestive of decreased absorption of fat -         TPN w/ MVI to compensate for low Vit E & A        Vit D levels low- being supplemented w/Ergocalciferol         Vit K INR/ PT near normal suggestive that it is being absorbed   -Edema greatly improved and Fluid overload resolved          w/ recent h/o HypoTN responded well to IVF           Increased total vol TPN 2000mL as pt still requiring IVF repletion          Increased Osm with Increased AA & Dextrose & Na               increase NaCl from 40-80mEQ and keep NaPhos the same in TPN  -Increased Urine Output- will continue to monitor  -Strict Intake and Output -            Continue to monitor while on octreotide, lomotil, tincture of opium, & imodium  -Leukocytosis- resolved- observing off abx  -Hyperglycemia-improving      Fingersticks & ISS coverage - as per SICU  -To continue monitoring of nutrition - Weights three times a week; Daily CMP, Mg, Ionized Ca, Phosphorus,        and Weekly Triglycerides and Pre-albumin  Continue as per SICU/Surgery, will follow with you, D/w primary team    Andreina Hubbard PA-C  TPN team, pager [phone]  D/w Ana M Chacko & MU Siegel A/P: 75 year old male w/ PMH of COPD and EtOH dependence with PSH/o appy, prostate surgery, & spine surgery  septic shock and high grade SBO s/p exploratory laparotomy, lysis of adhesions, decompression of bowel via enterotomy w/ primary repair, and Abthera VAC placement on 12/6; s/p take down of Abthera, washout and re-application of the Abthera vac on 12/8. Now s/p SBR and end ileostomy on 12/10.   TPN consulted to assist w/ management of pt's nutrition in pt w/ prolonged hospital course now tolerating diet but has high Ileostomy output.  Pt s/p Lt Chest Pigtail for effusion in IR with persistent high output on 1/10/2020 pt had VATs & placement of Left PleurX catheter. Pt op pt developed hemothorax and Respiratory Distress.  Pt is s/p Lt chest Evacuation of 2L blood w/ placement of CT x2 on 1/15/2020 for Lt Pleural Effusion that's resolving and doing well after CT removed and resolved Rt PNA vs Pleural Effusion.     Pt remains in SICU w/ High Ostomy & Urine Output,   Pt w/ improving severe Protein-Calorie Malnutrition-        Short Gut Syndrome w/Malabsorption- increasing back to GOAL TPN so as to Nutritionally optimize patient for potential OR              questioning absorption with stagnant Prealbumin and low TG               Pt unable to match nutritional GOALs w/ oral nutrition        TPN GOAL:    Amino Acids 120g, Dextrose 210g, and Lipids 50g in 2000mL with 3mL MTE & 10mL MVI        Pt tolerating Soft Mechanical w/ Ensure & Prosource supplements             -Concern for adrenal insufficiency (1/26 AM cortisol = 5.9)        ACTH Stim test pending        Consider ACTH level to have to compare  - HypoCa- improving w/ increased Ca in TPN to 14mEq- continue to monitor        Improved Ca levels helpful for BP & muscle contraction  -HypoPhos- improving w/-increased NaPhos & will continue to monitor   -Fecal fat testing suggestive of decreased absorption of fat -         TPN w/ MVI to compensate for low Vit E & A        Vit D levels low- being supplemented w/Ergocalciferol         Vit K INR/ PT near normal suggestive that it is being absorbed   -Edema greatly improved and Fluid overload resolved          w/ recent h/o HypoTN responded well to IVF           Increased total vol TPN 2000mL as pt still requiring IVF repletion          Increased Osm with Increased AA & Dextrose & Na.  Pt also given increased Lipids               increase NaCl from 40-80mEQ and keep NaPhos the same in TPN  -Increased Urine Output- will continue to monitor  -Strict Intake and Output -            Continue to monitor while on octreotide, lomotil, tincture of opium, & imodium  -Leukocytosis- resolved- observing off abx  -Hyperglycemia-improving      Fingersticks & ISS coverage - as per SICU  -To continue monitoring of nutrition - Weights three times a week; Daily CMP, Mg, Ionized Ca, Phosphorus,        and Weekly Triglycerides and Pre-albumin  Continue as per SICU/Surgery, will follow with you, D/w primary team    Andreina Hubbard PA-C  TPN team, pager [phone]  D/w Ana M Chacko & MU Siegel

## 2020-01-31 NOTE — PROGRESS NOTE ADULT - SUBJECTIVE AND OBJECTIVE BOX
GENERAL SURGERY PROGRESS NOTE    SUBJECTIVE/24 HOUR EVENTS:   - 1 L bolus given  - NAEO  - Patient seen and examined at bedside  - Patient feels well, tolerating diet, on TPN, oob/ambi, pain controlled, denies f/c/n/v/d/sob    PHYSICAL EXAM:  General: Appears well, NAD  Neuro: AAOx3  CHEST: Clear to auscultation bilaterally,  CV: Regular rate and rhythm  Abdomen: soft, nontender, nondistended, no rebound or guarding, ostomy pink, intact, draining semisolid  Extremities: Grossly symmetric    V/S:  Vital Signs Last 24 Hrs  T(C): 36.9 (31 Jan 2020 07:00), Max: 37.4 (30 Jan 2020 11:00)  T(F): 98.4 (31 Jan 2020 07:00), Max: 99.3 (30 Jan 2020 11:00)  HR: 87 (31 Jan 2020 09:00) (73 - 108)  BP: 108/85 (31 Jan 2020 09:00) (82/52 - 134/97)  BP(mean): 93 (31 Jan 2020 09:00) (62 - 109)  RR: 23 (31 Jan 2020 09:00) (18 - 37)  SpO2: 99% (31 Jan 2020 09:00) (91% - 100%)    --------------------------------------------------------------------------------------------------  I/Os:    30 Jan 2020 07:01  -  31 Jan 2020 07:00  --------------------------------------------------------  IN:    fat emulsion (Fish Oil and Plant Based) 20% Infusion: 150 mL    Oral Fluid: 120 mL    Sodium Chloride 0.9% IV Bolus: 1000 mL    TPN (Total Parenteral Nutrition): 1541 mL  Total IN: 2811 mL    OUT:    Ileostomy: 2500 mL    Voided: 2450 mL  Total OUT: 4950 mL    Total NET: -2139 mL      31 Jan 2020 07:01  -  31 Jan 2020 09:43  --------------------------------------------------------  IN:    TPN (Total Parenteral Nutrition): 134 mL  Total IN: 134 mL    OUT:  Total OUT: 0 mL    Total NET: 134 mL        --------------------------------------------------------------------------------------------------  LABS:                        7.6    10.86 )-----------( 269      ( 31 Jan 2020 04:10 )             23.6     31 Jan 2020 04:10    135    |  96     |  17     ----------------------------<  106    4.0     |  29     |  0.42     Ca    8.3        31 Jan 2020 04:10  Phos  4.2       31 Jan 2020 04:10  Mg     2.0       31 Jan 2020 04:10    TPro  6.1    /  Alb  2.5    /  TBili  0.8    /  DBili  0.4    /  AST  12     /  ALT  27     /  AlkPhos  85     31 Jan 2020 04:10      CAPILLARY BLOOD GLUCOSE            LIVER FUNCTIONS - ( 31 Jan 2020 04:10 )  Alb: 2.5 g/dL / Pro: 6.1 g/dL / ALK PHOS: 85 U/L / ALT: 27 U/L / AST: 12 U/L / GGT: x               --------------------------------------------------------------------------------------------------  MEDICATIONS  (STANDING):  albuterol/ipratropium for Nebulization. 3 milliLiter(s) Nebulizer every 6 hours  buDESOnide    Inhalation Suspension 0.5 milliGRAM(s) Inhalation every 12 hours  chlorhexidine 2% Cloths 1 Application(s) Topical <User Schedule>  diphenoxylate/atropine 2 Tablet(s) Oral <User Schedule>  enoxaparin Injectable 40 milliGRAM(s) SubCutaneous daily  ergocalciferol 73104 Unit(s) Oral every week  fat emulsion (Fish Oil and Plant Based) 20% Infusion 12.5 mL/Hr (12.5 mL/Hr) IV Continuous <Continuous>  loperamide 16 milliGRAM(s) Oral <User Schedule>  octreotide  Injectable 100 MICROGram(s) SubCutaneous three times a day  opium Tincture 6 milliGRAM(s) Oral <User Schedule>  pantoprazole    Tablet 40 milliGRAM(s) Oral before breakfast  Parenteral Nutrition - Adult 1 Each (67 mL/Hr) TPN Continuous <Continuous>  psyllium Powder 1 Packet(s) Oral three times a day  tamsulosin 0.4 milliGRAM(s) Oral daily    MEDICATIONS  (PRN):  acetaminophen   Tablet .. 975 milliGRAM(s) Oral every 6 hours PRN Mild Pain (1 - 3)  diphenhydramine 2%/zinc acetate 0.1% Cream 1 Application(s) Topical two times a day PRN Itching  ondansetron Injectable 4 milliGRAM(s) IV Push every 6 hours PRN Nausea and/or Vomiting

## 2020-01-31 NOTE — PROGRESS NOTE ADULT - ASSESSMENT
74 y/o M presenting with septic shock and high grade SBO s/p exploratory laparotomy, lysis of adhesions, decompression of bowel via enterotomy w/ primary repair, and Abthera VAC placement on 12/6; s/p take down of Abthera, washout and re-application of the Abthera vac on 12/8. 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 s/p L chest tube placement by IR 12/30 for pleural effusion now s/p VATS, with chest tube insertion for drainage of pleural effusion. RRT called 1/13 AM for hypoxia, patient transferred back to SICU.  Patient continued SOB, chest drain possible obstruction expanding. Patent taken back to OR emergently 1/15 for clot evac and VATS.     PLAN:  - Continue diet as tolerated  - Monitor Ileostomy output  - OOB to chair  - Continue Lomotil, loperamide, tincture of opium, octreotide  - Appreciate continued SICU care    [facility]  [id number]

## 2020-01-31 NOTE — PROGRESS NOTE ADULT - ATTENDING COMMENTS
Awake and alert, comfortable  Saturating well on 2L, on his COPD meds  PO/Full strength TPN with extra volume recommended for persistent high output, received another bolus to match I/O  One dose DDAVP  ACTH stimulation test suggest adrenal insufficiency, ACTH level pending  PT/mobilization

## 2020-01-31 NOTE — PROGRESS NOTE ADULT - ASSESSMENT
75 y/o male presenting with high grade SBO s/p exploratory laparotomy, lysis of adhesions, decompression of bowel via enterotomy w/ primary repair, & Abthera VAC placement (12/06/2019); RTOR for re-exploration w/ Abthera VAC replacement (12/08/2019) to allow for demarcation of ischemic small bowel; RTOR for re-exploration, small bowel resection of 150 cm (150 cm remaining), ileocecetomy, end ileostomy, mucous fistula, and abdominal closure (12/10/2019); hospital course complicated by SVT, short bowel syndrome requiring repletions w/ IV fluids, malnutrition requiring TPN, intermittent episodes of hypotension, spontaneous left pneumothorax s/p pigtail catheter (12/15/2019-12/24/2019), left pleural effusion s/p pigtail catheter w/ IR (12/30/2019), dysphagia, and oral HSV lesions, placement of Pleurx on 1/10, now re-admitted to SICU with acute hypoxic respiratory failure s/p repeat VATS with 2 liter hemothorax removed. Respiratory failure resolved, now stable in SICU.    Neuro: no acute issues  - Pain control as needed with acetaminophen    Resp: COPD, spontaneous left pneumothorax s/p pigtail catheter, left pleural effusion s/p Pleurx 1/10.  Intubated for respiratory distress s/p VATS 01/15 with 2 Liter of blood evacuation s/p removal of chest tubes   - Pulmicort and Duoneb for COPD  - Will need outpatient follow-up with a pulmonologist for his COPD as patient does not currently have one  -F/u repeat CXR tomorrow AM (1/31)     CV: no acute issues  - Monitor hemodynamics, I&Os    GI: s/p exploratory laparotomy, [place]ia, decompression of bowel via enterotomy w/ primary repair, & Abthera VAC placement (12/06/2019); re-exploration w/ ABthera VAC replacement (12/08/2019); re-exploration, small bowel resection of 150 cm (150 cm remaining), ileocecectomy end ileostomy, mucous fistula, and abdominal closure (12/10/2019); short bowel syndrome, malnutrition, dysphagia  - Mechanical soft diet + TPN  - Monitor ostomy output  - Continue Lomotil, tincture of opium, loperamide, metamucil, and octreotide for short bowel syndrome  - Protonix to decrease gastric secretions  - Continue Vitamin D supplementation    Renal: polyuria 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 s/p DDAVP with decrease in urine output (last dose 1/28)  - Monitor I&Os and daily weights   - Monitor electrolytes and replete as necessary  - Flomax for urinary retention  - Daily assessment for DDAVP based on urine output      Heme: anemia likely secondary to malnutrition / malabsorption  - Monitor CBC and coags   - Lovenox for VTE prophylaxis    ID: oral HSV lesions (s/p acyclovir 12/23/2019-01/02/2020); completed course of meropenem for Zosyn-resistant Pseudomonas PNA   - Monitor for fever, culture if febrile    Endo: possible adrenal insufficiency  - Monitor glucose on BMPs   - Follow up Cosyntropin test, takes 3-5 days sent on 1/28      Disposition: Will remain in SICU.

## 2020-02-01 LAB
ALBUMIN SERPL ELPH-MCNC: 2.6 G/DL — LOW (ref 3.3–5)
ALP SERPL-CCNC: 84 U/L — SIGNIFICANT CHANGE UP (ref 40–120)
ALT FLD-CCNC: 20 U/L — SIGNIFICANT CHANGE UP (ref 10–45)
ANION GAP SERPL CALC-SCNC: 9 MMOL/L — SIGNIFICANT CHANGE UP (ref 5–17)
AST SERPL-CCNC: 15 U/L — SIGNIFICANT CHANGE UP (ref 10–40)
BILIRUB DIRECT SERPL-MCNC: 0.4 MG/DL — HIGH (ref 0–0.2)
BILIRUB INDIRECT FLD-MCNC: 0.4 MG/DL — SIGNIFICANT CHANGE UP (ref 0.2–1)
BILIRUB SERPL-MCNC: 0.8 MG/DL — SIGNIFICANT CHANGE UP (ref 0.2–1.2)
BUN SERPL-MCNC: 26 MG/DL — HIGH (ref 7–23)
CA-I BLD-SCNC: 1.16 MMOL/L — SIGNIFICANT CHANGE UP (ref 1.12–1.3)
CALCIUM SERPL-MCNC: 8.6 MG/DL — SIGNIFICANT CHANGE UP (ref 8.4–10.5)
CHLORIDE SERPL-SCNC: 97 MMOL/L — SIGNIFICANT CHANGE UP (ref 96–108)
CO2 SERPL-SCNC: 30 MMOL/L — SIGNIFICANT CHANGE UP (ref 22–31)
CREAT SERPL-MCNC: 0.45 MG/DL — LOW (ref 0.5–1.3)
GLUCOSE SERPL-MCNC: 131 MG/DL — HIGH (ref 70–99)
HCT VFR BLD CALC: 23 % — LOW (ref 39–50)
HGB BLD-MCNC: 7.3 G/DL — LOW (ref 13–17)
MAGNESIUM SERPL-MCNC: 1.8 MG/DL — SIGNIFICANT CHANGE UP (ref 1.6–2.6)
MCHC RBC-ENTMCNC: 30.7 PG — SIGNIFICANT CHANGE UP (ref 27–34)
MCHC RBC-ENTMCNC: 31.7 GM/DL — LOW (ref 32–36)
MCV RBC AUTO: 96.6 FL — SIGNIFICANT CHANGE UP (ref 80–100)
NRBC # BLD: 0 /100 WBCS — SIGNIFICANT CHANGE UP (ref 0–0)
PHOSPHATE SERPL-MCNC: 3.8 MG/DL — SIGNIFICANT CHANGE UP (ref 2.5–4.5)
PLATELET # BLD AUTO: 281 K/UL — SIGNIFICANT CHANGE UP (ref 150–400)
POTASSIUM SERPL-MCNC: 4.6 MMOL/L — SIGNIFICANT CHANGE UP (ref 3.5–5.3)
POTASSIUM SERPL-SCNC: 4.6 MMOL/L — SIGNIFICANT CHANGE UP (ref 3.5–5.3)
PROT SERPL-MCNC: 6.6 G/DL — SIGNIFICANT CHANGE UP (ref 6–8.3)
RBC # BLD: 2.38 M/UL — LOW (ref 4.2–5.8)
RBC # FLD: 13.8 % — SIGNIFICANT CHANGE UP (ref 10.3–14.5)
SODIUM SERPL-SCNC: 136 MMOL/L — SIGNIFICANT CHANGE UP (ref 135–145)
WBC # BLD: 10.4 K/UL — SIGNIFICANT CHANGE UP (ref 3.8–10.5)
WBC # FLD AUTO: 10.4 K/UL — SIGNIFICANT CHANGE UP (ref 3.8–10.5)

## 2020-02-01 PROCEDURE — 99232 SBSQ HOSP IP/OBS MODERATE 35: CPT

## 2020-02-01 RX ORDER — DIPHENOXYLATE HCL/ATROPINE 2.5-.025MG
2 TABLET ORAL
Refills: 0 | Status: DISCONTINUED | OUTPATIENT
Start: 2020-02-01 | End: 2020-02-04

## 2020-02-01 RX ORDER — MORPHINE 10 MG/ML
6 SOLUTION ORAL
Refills: 0 | Status: DISCONTINUED | OUTPATIENT
Start: 2020-02-01 | End: 2020-02-04

## 2020-02-01 RX ORDER — I.V. FAT EMULSION 20 G/100ML
20.8 EMULSION INTRAVENOUS
Qty: 50 | Refills: 0 | Status: DISCONTINUED | OUTPATIENT
Start: 2020-02-01 | End: 2020-02-02

## 2020-02-01 RX ORDER — MAGNESIUM SULFATE 500 MG/ML
2 VIAL (ML) INJECTION ONCE
Refills: 0 | Status: COMPLETED | OUTPATIENT
Start: 2020-02-01 | End: 2020-02-01

## 2020-02-01 RX ORDER — NYSTATIN/TRIAMCINOLONE ACET
1 OINTMENT (GRAM) TOPICAL
Refills: 0 | Status: DISCONTINUED | OUTPATIENT
Start: 2020-02-01 | End: 2020-02-20

## 2020-02-01 RX ORDER — ELECTROLYTE SOLUTION,INJ
1 VIAL (ML) INTRAVENOUS
Refills: 0 | Status: DISCONTINUED | OUTPATIENT
Start: 2020-02-01 | End: 2020-02-01

## 2020-02-01 RX ADMIN — Medication 3 MILLILITER(S): at 05:50

## 2020-02-01 RX ADMIN — Medication 2 TABLET(S): at 07:33

## 2020-02-01 RX ADMIN — TAMSULOSIN HYDROCHLORIDE 0.4 MILLIGRAM(S): 0.4 CAPSULE ORAL at 11:17

## 2020-02-01 RX ADMIN — Medication 16 MILLIGRAM(S): at 17:09

## 2020-02-01 RX ADMIN — Medication 1 PACKET(S): at 14:00

## 2020-02-01 RX ADMIN — Medication 0.5 MILLIGRAM(S): at 18:10

## 2020-02-01 RX ADMIN — Medication 2 TABLET(S): at 23:13

## 2020-02-01 RX ADMIN — Medication 1 PACKET(S): at 06:04

## 2020-02-01 RX ADMIN — OCTREOTIDE ACETATE 100 MICROGRAM(S): 200 INJECTION, SOLUTION INTRAVENOUS; SUBCUTANEOUS at 23:11

## 2020-02-01 RX ADMIN — Medication 1 APPLICATION(S): at 23:11

## 2020-02-01 RX ADMIN — OCTREOTIDE ACETATE 100 MICROGRAM(S): 200 INJECTION, SOLUTION INTRAVENOUS; SUBCUTANEOUS at 14:00

## 2020-02-01 RX ADMIN — Medication 50 GRAM(S): at 04:00

## 2020-02-01 RX ADMIN — CHLORHEXIDINE GLUCONATE 1 APPLICATION(S): 213 SOLUTION TOPICAL at 06:03

## 2020-02-01 RX ADMIN — Medication 16 MILLIGRAM(S): at 12:33

## 2020-02-01 RX ADMIN — Medication 3 MILLILITER(S): at 18:09

## 2020-02-01 RX ADMIN — OCTREOTIDE ACETATE 100 MICROGRAM(S): 200 INJECTION, SOLUTION INTRAVENOUS; SUBCUTANEOUS at 00:46

## 2020-02-01 RX ADMIN — Medication 1 PACKET(S): at 23:12

## 2020-02-01 RX ADMIN — ENOXAPARIN SODIUM 40 MILLIGRAM(S): 100 INJECTION SUBCUTANEOUS at 11:17

## 2020-02-01 RX ADMIN — PANTOPRAZOLE SODIUM 40 MILLIGRAM(S): 20 TABLET, DELAYED RELEASE ORAL at 07:33

## 2020-02-01 RX ADMIN — Medication 3 MILLILITER(S): at 01:25

## 2020-02-01 RX ADMIN — Medication 1 EACH: at 16:53

## 2020-02-01 RX ADMIN — Medication 2 TABLET(S): at 12:32

## 2020-02-01 RX ADMIN — I.V. FAT EMULSION 20.8 ML/HR: 20 EMULSION INTRAVENOUS at 16:52

## 2020-02-01 RX ADMIN — Medication 3 MILLILITER(S): at 13:23

## 2020-02-01 RX ADMIN — Medication 2 TABLET(S): at 17:09

## 2020-02-01 RX ADMIN — Medication 16 MILLIGRAM(S): at 07:34

## 2020-02-01 RX ADMIN — MORPHINE 6 MILLIGRAM(S): 10 SOLUTION ORAL at 12:32

## 2020-02-01 RX ADMIN — OCTREOTIDE ACETATE 100 MICROGRAM(S): 200 INJECTION, SOLUTION INTRAVENOUS; SUBCUTANEOUS at 06:03

## 2020-02-01 RX ADMIN — Medication 0.5 MILLIGRAM(S): at 05:50

## 2020-02-01 RX ADMIN — MORPHINE 6 MILLIGRAM(S): 10 SOLUTION ORAL at 07:33

## 2020-02-01 RX ADMIN — MORPHINE 6 MILLIGRAM(S): 10 SOLUTION ORAL at 17:09

## 2020-02-01 RX ADMIN — Medication 16 MILLIGRAM(S): at 23:12

## 2020-02-01 NOTE — PROGRESS NOTE ADULT - PROBLEM SELECTOR PLAN 1
s/p ex lap, MIRYAM, closure of enterotomy, abthera vac placement  s/p washout  Plan for RTOR tuesday. Acceptable cardiac risk to proceed   s/p PICC line placement . On TPN

## 2020-02-01 NOTE — PROGRESS NOTE ADULT - ASSESSMENT
75 y/o male presenting with high grade SBO s/p exploratory laparotomy, lysis of adhesions, decompression of bowel via enterotomy w/ primary repair, & Abthera VAC placement (12/06/2019); RTOR for re-exploration w/ Abthera VAC replacement (12/08/2019) to allow for demarcation of ischemic small bowel; RTOR for re-exploration, small bowel resection of 150 cm (150 cm remaining), ileocecetomy, end ileostomy, mucous fistula, and abdominal closure (12/10/2019); hospital course complicated by SVT, short bowel syndrome requiring repletions w/ IV fluids, malnutrition requiring TPN, intermittent episodes of hypotension, spontaneous left pneumothorax s/p pigtail catheter (12/15/2019-12/24/2019), left pleural effusion s/p pigtail catheter w/ IR (12/30/2019), dysphagia, and oral HSV lesions, placement of Pleurx on 1/10, now re-admitted to SICU with acute hypoxic respiratory failure s/p repeat VATS with 2 liter hemothorax removed. Respiratory failure resolved, now stable in SICU.    Neuro: no acute issues  - Pain control as needed with acetaminophen    Resp: COPD, spontaneous left pneumothorax s/p pigtail catheter, left pleural effusion s/p Pleurx 1/10.  Intubated for respiratory distress s/p VATS 01/15 with 2 Liter of blood evacuation s/p removal of chest tubes   - Pulmicort and Duoneb for COPD  - Will need outpatient follow-up with a pulmonologist for his COPD as patient does not currently have one  -F/u repeat AM CXR    CV: no acute issues  - Monitor hemodynamics, I&Os    GI: s/p exploratory laparotomy, Grecia, decompression of bowel via enterotomy w/ primary repair, & Abthera VAC placement (12/06/2019); re-exploration w/ ABthera VAC replacement (12/08/2019); re-exploration, small bowel resection of 150 cm (150 cm remaining), ileocecectomy end ileostomy, mucous fistula, and abdominal closure (12/10/2019); short bowel syndrome, malnutrition, dysphagia  - Mechanical soft diet + TPN  - Monitor ostomy output 1.5L past 24hrs  - Continue Lomotil, tincture of opium, loperamide, metamucil, and octreotide for short bowel syndrome  - Protonix to decrease gastric secretions  - Continue Vitamin D supplementation    Renal: polyuria 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 s/p DDAVP with decrease in urine output (last dose 1/28)  - Monitor I&Os and daily weights, 1L bolus given for repletions  - Flomax for urinary retention  - DDAVP 4mcg given w/ appropriate response      Heme: anemia likely secondary to malnutrition / malabsorption  - Monitor CBC and coags   - Lovenox for VTE prophylaxis    ID: oral HSV lesions (s/p acyclovir 12/23/2019-01/02/2020); completed course of meropenem for Zosyn-resistant Pseudomonas PNA   - Monitor for fever, culture if febrile    Endo: possible adrenal insufficiency  - Monitor glucose on BMPs   - Cosyntropin test indicative of adrenal insufficiency

## 2020-02-01 NOTE — PROGRESS NOTE ADULT - SUBJECTIVE AND OBJECTIVE BOX
GENERAL SURGERY PROGRESS NOTE    SUBJECTIVE/24 HOUR EVENTS:   - 1L bolus given for repletion  - 4 mcg DDAVP given  - TPN volume increased  - complained of L ankle pain, xray obtained showing soft tissue swelling  - Patient seen and examined at bedside  - Patient feels well, tolerating diet, on TPN, oob/ambi, pain controlled, denies f/c/n/v/d/sob    PHYSICAL EXAM:  General: Appears well, NAD  Neuro: AAOx3  CHEST: Clear to auscultation bilaterally,  CV: Regular rate and rhythm  Abdomen: soft, nontender, nondistended, no rebound or guarding, ostomy pink, intact, draining semisolid  Extremities: Grossly symmetric    V/S:  Vital Signs Last 24 Hrs  T(C): 36.7 (01 Feb 2020 07:00), Max: 37.1 (31 Jan 2020 15:00)  T(F): 98 (01 Feb 2020 07:00), Max: 98.8 (31 Jan 2020 15:00)  HR: 89 (01 Feb 2020 09:00) (71 - 101)  BP: 120/56 (01 Feb 2020 09:00) (88/53 - 148/71)  BP(mean): 80 (01 Feb 2020 09:00) (64 - 102)  RR: 28 (01 Feb 2020 09:00) (18 - 48)  SpO2: 100% (01 Feb 2020 09:00) (96% - 100%)    --------------------------------------------------------------------------------------------------  I/Os:    31 Jan 2020 07:01  -  01 Feb 2020 07:00  --------------------------------------------------------  IN:    fat emulsion (Fish Oil and Plant Based) 20% Infusion: 270.4 mL    Oral Fluid: 360 mL    Solution: 50 mL    TPN (Total Parenteral Nutrition): 1765 mL  Total IN: 2445.4 mL    OUT:    Ileostomy: 2300 mL    Voided: 1400 mL  Total OUT: 3700 mL    Total NET: -1254.6 mL        --------------------------------------------------------------------------------------------------  LABS:                        7.3    10.40 )-----------( 281      ( 01 Feb 2020 01:43 )             23.0     01 Feb 2020 01:43    136    |  97     |  26     ----------------------------<  131    4.6     |  30     |  0.45     Ca    8.6        01 Feb 2020 01:43  Phos  3.8       01 Feb 2020 01:43  Mg     1.8       01 Feb 2020 01:43    TPro  6.6    /  Alb  2.6    /  TBili  0.8    /  DBili  0.4    /  AST  15     /  ALT  20     /  AlkPhos  84     01 Feb 2020 01:43      CAPILLARY BLOOD GLUCOSE            LIVER FUNCTIONS - ( 01 Feb 2020 01:43 )  Alb: 2.6 g/dL / Pro: 6.6 g/dL / ALK PHOS: 84 U/L / ALT: 20 U/L / AST: 15 U/L / GGT: x               --------------------------------------------------------------------------------------------------  MEDICATIONS  (STANDING):  albuterol/ipratropium for Nebulization. 3 milliLiter(s) Nebulizer every 6 hours  buDESOnide    Inhalation Suspension 0.5 milliGRAM(s) Inhalation every 12 hours  chlorhexidine 2% Cloths 1 Application(s) Topical <User Schedule>  diphenoxylate/atropine 2 Tablet(s) Oral <User Schedule>  enoxaparin Injectable 40 milliGRAM(s) SubCutaneous daily  ergocalciferol 13577 Unit(s) Oral every week  fat emulsion (Fish Oil and Plant Based) 20% Infusion 20.8 mL/Hr (20.8 mL/Hr) IV Continuous <Continuous>  loperamide 16 milliGRAM(s) Oral <User Schedule>  octreotide  Injectable 100 MICROGram(s) SubCutaneous three times a day  opium Tincture 6 milliGRAM(s) Oral <User Schedule>  pantoprazole    Tablet 40 milliGRAM(s) Oral before breakfast  Parenteral Nutrition - Adult 1 Each (83 mL/Hr) TPN Continuous <Continuous>  psyllium Powder 1 Packet(s) Oral three times a day  tamsulosin 0.4 milliGRAM(s) Oral daily    MEDICATIONS  (PRN):  acetaminophen   Tablet .. 975 milliGRAM(s) Oral every 6 hours PRN Mild Pain (1 - 3)  diphenhydramine 2%/zinc acetate 0.1% Cream 1 Application(s) Topical two times a day PRN Itching  ondansetron Injectable 4 milliGRAM(s) IV Push every 6 hours PRN Nausea and/or Vomiting

## 2020-02-01 NOTE — PROVIDER CONTACT NOTE (OTHER) - ACTION/TREATMENT ORDERED:
SICU team made aware
500ml bolus of NS
Do EKG & CXR, pt assessed. Chest tube to be flushed
Get patient back in bed, recheck VS. No intervention at this time. Continue to monitor. Pt pressure improved upon returning to bed.
Give 500ml LR bolus. Continue to monitor.
Increased frequency of metoprolol to q4 hours. Continue to monitor.
No intervention at this time. Continue to monitor.
Please see RRT Sheet
Pt assessed by LARA Bah & MD Carroll. Pt placed on EPAP overnight. Pt pleurx catheter tubing changed by thoracic team. Continue to monitor pt& notify provider for further changes.
Put patient on 6L NC and wait for chest x-ray before further intervention as patient has COPD. Ok to maintain Spo2 greater than 88%.
STAT x-ray. Leave pt on NC as long as SpO2 >92%
Send AM labs as ordered. After result of low Hb/Hct - send type and screen and give 1 unit PRBC. Pending type and screen result.
no interventions ordered at this time

## 2020-02-01 NOTE — PROGRESS NOTE ADULT - ASSESSMENT
74 y/o M presenting with septic shock and high grade SBO s/p exploratory laparotomy, lysis of adhesions, decompression of bowel via enterotomy w/ primary repair, and Abthera VAC placement on 12/6; s/p take down of Abthera, washout and re-application of the Abthera vac on 12/8. 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 s/p L chest tube placement by IR 12/30 for pleural effusion now s/p VATS, with chest tube insertion for drainage of pleural effusion. RRT called 1/13 AM for hypoxia, patient transferred back to SICU.  Patient continued SOB, chest drain possible obstruction expanding. Patent taken back to OR emergently 1/15 for clot evac and VATS.     PLAN:  - OR on Tuesday for ileostomy reversal  - Continue diet as tolerated  - Monitor Ileostomy output  - OOB to chair  - Continue Lomotil, loperamide, tincture of opium, octreotide  - Appreciate continued SICU care    [facility]  [id number]

## 2020-02-01 NOTE — PROVIDER CONTACT NOTE (OTHER) - RECOMMENDATIONS
Providers notified, and keeping area dry at this time
Aerosol mask?
Assess pt
Assess pt at the bedside, do chest Xray
Come assess at bedside. STAT x-ray to assess lungs and determine if NGT is still in place
Get patient back in bed, recheck VS and consider another 500mL bolus. Continue to monitor.
Give 500ml IVF bolus. Continue to monitor.
Give another 500mL LR bolus. Continue to monitor.
Metoprolol?
RRT called
Send AM labs, consider blood transfusion
fluid bolus
fluids?

## 2020-02-01 NOTE — PROGRESS NOTE ADULT - SUBJECTIVE AND OBJECTIVE BOX
SICU DAILY PROGRESS NOTE    75y Male past medical history of COPD and EtOH dependence who presented on 12/6/2019 with abdominal pain, nausea, hematemesis, and poor PO intake for ~2-3 days. In the ED, he was found to be hypotensive & tachycardic. Labs revealed an CHI and lactic acidosis. Imaging revealed a high grade SBO in the RLQ on CT scan. Hospital course is as follows:  12/06 - s/p exploratory laparotomy, lysis of adhesions, decompression of bowel via enterotomy w/ primary repair, and Abthera VAC placement as the distal 50% of the bowel appeared dusky but was still viable, admitted to SICU post-operatively as he was on vasopressor support and was left intubated  12/08 - s/p re-exploration, proximal 165 cm of small bowel appeared pink & viable but beyond that had patchy areas of ischemia so decision was made to give the bowel more time to demarcate before resecting in order to preserve as much small bowel as possible so Abthera VAC was replaced  12/09 - s/p re-exploration, small bowel resection of 150 cm (150 cm remaining), ileocecetomy, end ileostomy, mucous fistula, and abdominal closure  12/11 - extubated to BiPAP, noted to be in SVT that was rate controlled w/ metoprolol  12/14 - started on TPN  12/15 - noted to have spontaneous left pneumothorax s/p left pigtail catheter placement, noted to be in SVT again so amiodarone started, high ileostomy output noted so concern for short bowel syndrome  12/16 - amiodarone discontinued, started on beta blocker with metoprolol  12/18 - started Lomotil and ileostomy repletions with IV fluids  12/19 - started Imodium, left pigtail catheter was placed to water seal but pneumothorax noted on follow-up CXR so placed back to suction  12/20 - started tincture of opium, concern for aspiration so changed diet from regular to dysphagia 1 pureed with nectar-thickened liquids  12/22 - left pigtail catheter placed to water seal with no pneumothorax noted on follow-up CXR, CT chest with moderate left pleural effusion not being drained by pigtail catheter  12/23 - 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  12/24 - left pigtail catheter discontinued  12/28 - started Ancef for erythematous midline wound  12/29 - beta blocker discontinued for intermittent episodes of hypotension  12/30 - left pigtail catheter placement by IR with drainage of serous transudative fluid  01/02 - FEES demonstrating penetration with thin liquids so patient kept on dysphagia 1 pureed with nectar-thickened liquids  01/07 - Transferred to floors  01/10 - s/p left VATS w/ PleurX catheter placement  01/11 - evaluated by speech & swallow, advanced diet to mechanical soft with thin liquids  01/12 - PleurX catheter placed to water seal with on pneumothorax on follow-up CXR  01/13 - RRT for hypoxemia, placed on BiPAP, PleurX catheter placed back to suction, started vancomycin & Zosyn for possible HCAP  01/14 - noted to have minimal output from PleurX catheter, lung ultrasound with large left pleural effusion concerning for hemothorax, multiple episodes of bradycardia secondary to hypoxemia, remains on BiPAP  01/15 - worsening respiratory distress requiring intubation, hypotensive requiring vasopressor support, CT chest with large left hemothorax w/ trace pneumothorax & extensive subcutaneous emphysema so taken to OR emergently for left VATS, evacuation fo 2 L of clot, and placement of two left chest tubes  01/16 - extubated, weaned off vasopressor support  01/17 - two left chest tubes placed to water seal with no pneumothorax noted on follow-up CXR      24 HOUR EVENTS:  - 1L bolus given for repletion  - 4 mcg DDAVP given  - TPN volume increased  - complained of L ankle pain, xray obtained showing soft tissue swelling  - discussed w/ Dr. Siegel, OR planning for next Tuesday    SUBJECTIVE/ROS:  [x] A ten-point review of systems was otherwise negative except as noted.  [ ] Due to altered mental status/intubation, subjective information were not able to be obtained from the patient. History was obtained, to the extent possible, from review of the chart and collateral sources of information.      NEURO  Exam: awake, alert, oriented  Meds: acetaminophen   Tablet .. 975 milliGRAM(s) Oral every 6 hours PRN Mild Pain (1 - 3)  ondansetron Injectable 4 milliGRAM(s) IV Push every 6 hours PRN Nausea and/or Vomiting    [x] Adequacy of sedation and pain control has been assessed and adjusted      RESPIRATORY  RR: 26 (01-31-20 @ 23:00) (18 - 37)  SpO2: 100% (01-31-20 @ 23:00) (95% - 100%)  Exam: unlabored, clear to auscultation bilaterally    Meds: albuterol/ipratropium for Nebulization. 3 milliLiter(s) Nebulizer every 6 hours  buDESOnide    Inhalation Suspension 0.5 milliGRAM(s) Inhalation every 12 hours    CARDOVASCULAR  HR: 87 (01-31-20 @ 23:00) (73 - 101)  BP: 104/59 (01-31-20 @ 23:00) (82/52 - 148/71)  BP(mean): 78 (01-31-20 @ 23:00) (62 - 102)  ABP: --  ABP(mean): --  Wt(kg): --  CVP(cm H2O): --      Exam: regular rate and rhythm  Cardiac Rhythm: sinus  Perfusion     [x]Adequate   [ ]Inadequate  Mentation   [x]Normal       [ ]Reduced  Extremities  [x]Warm         [ ]Cool  Volume Status [ ]Hypervolemic [x]Euvolemic [ ]Hypovolemic  Meds: tamsulosin 0.4 milliGRAM(s) Oral daily      GI/NUTRITION  Exam: soft, nontender, nondistended, incision C/D/I  Diet: mechanical soft  Meds: loperamide 16 milliGRAM(s) Oral <User Schedule>  pantoprazole    Tablet 40 milliGRAM(s) Oral before breakfast  psyllium Powder 1 Packet(s) Oral three times a day      GENITOURINARY  I&O's Detail    01-30 @ 07:01  -  01-31 @ 07:00  --------------------------------------------------------  IN:    fat emulsion (Fish Oil and Plant Based) 20% Infusion: 150 mL    Oral Fluid: 120 mL    Sodium Chloride 0.9% IV Bolus: 1000 mL    TPN (Total Parenteral Nutrition): 1541 mL  Total IN: 2811 mL    OUT:    Ileostomy: 2500 mL    Voided: 2450 mL  Total OUT: 4950 mL    Total NET: -2139 mL      01-31 @ 07:01  -  02-01 @ 00:40  --------------------------------------------------------  IN:    fat emulsion (Fish Oil and Plant Based) 20% Infusion: 145.6 mL    Oral Fluid: 360 mL    TPN (Total Parenteral Nutrition): 1184 mL  Total IN: 1689.6 mL    OUT:    Ileostomy: 1500 mL    Voided: 1000 mL  Total OUT: 2500 mL    Total NET: -810.4 mL          01-31    135  |  96  |  17  ----------------------------<  106<H>  4.0   |  29  |  0.42<L>    Ca    8.3<L>      31 Jan 2020 04:10  Phos  4.2     01-31  Mg     2.0     01-31    TPro  6.1  /  Alb  2.5<L>  /  TBili  0.8  /  DBili  0.4<H>  /  AST  12  /  ALT  27  /  AlkPhos  85  01-31    [ ] Martinez catheter, indication: N/A  Meds: ergocalciferol 95346 Unit(s) Oral every week  fat emulsion (Fish Oil and Plant Based) 20% Infusion 20.8 mL/Hr IV Continuous <Continuous>  Parenteral Nutrition - Adult 1 Each TPN Continuous <Continuous>        HEMATOLOGIC  Meds: enoxaparin Injectable 40 milliGRAM(s) SubCutaneous daily    [x] VTE Prophylaxis                        7.6    10.86 )-----------( 269      ( 31 Jan 2020 04:10 )             23.6       Transfusion     [ ] PRBC   [ ] Platelets   [ ] FFP   [ ] Cryoprecipitate      INFECTIOUS DISEASES  WBC Count: 10.86 K/uL (01-31 @ 04:10)    Meds: octreotide  Injectable 100 MICROGram(s) SubCutaneous three times a day    ACCESS DEVICES:  [x] Peripheral IV  [ ] Central Venous Line	[ ] R	[ ] L	[ ] IJ	[ ] Fem	[ ] SC	Placed:   [ ] Arterial Line		[ ] R	[ ] L	[ ] Fem	[ ] Rad	[ ] Ax	Placed:   [ ] PICC:					[ ] Mediport  [ ] Urinary Catheter, Date Placed:   [x] Necessity of urinary, arterial, and venous catheters discussed    OTHER MEDICATIONS:  chlorhexidine 2% Cloths 1 Application(s) Topical <User Schedule>  diphenhydramine 2%/zinc acetate 0.1% Cream 1 Application(s) Topical two times a day PRN      CODE STATUS: full

## 2020-02-01 NOTE — PROGRESS NOTE ADULT - SUBJECTIVE AND OBJECTIVE BOX
Subjective: Patient seen and examined. No new events except as noted.   Remains in ICU   no cp or sob     REVIEW OF SYSTEMS:    CONSTITUTIONAL: + weakness, fevers or chills  EYES/ENT: No visual changes;  No vertigo or throat pain   NECK: No pain or stiffness  RESPIRATORY: No cough, wheezing, hemoptysis; No shortness of breath  CARDIOVASCULAR: No chest pain or palpitations  GASTROINTESTINAL: No abdominal or epigastric pain. No nausea, vomiting, or hematemesis; No diarrhea or constipation. No melena or hematochezia.  GENITOURINARY: No dysuria, frequency or hematuria  NEUROLOGICAL: No numbness or weakness  SKIN: No itching, burning, rashes, or lesions   All other review of systems is negative unless indicated above.    MEDICATIONS:  MEDICATIONS  (STANDING):  albuterol/ipratropium for Nebulization. 3 milliLiter(s) Nebulizer every 6 hours  buDESOnide    Inhalation Suspension 0.5 milliGRAM(s) Inhalation every 12 hours  chlorhexidine 2% Cloths 1 Application(s) Topical <User Schedule>  diphenoxylate/atropine 2 Tablet(s) Oral <User Schedule>  enoxaparin Injectable 40 milliGRAM(s) SubCutaneous daily  ergocalciferol 58110 Unit(s) Oral every week  fat emulsion (Fish Oil and Plant Based) 20% Infusion 20.8 mL/Hr (20.8 mL/Hr) IV Continuous <Continuous>  loperamide 16 milliGRAM(s) Oral <User Schedule>  nystatin/triamcinolone Cream 1 Application(s) Topical two times a day  octreotide  Injectable 100 MICROGram(s) SubCutaneous three times a day  opium Tincture 6 milliGRAM(s) Oral <User Schedule>  pantoprazole    Tablet 40 milliGRAM(s) Oral before breakfast  Parenteral Nutrition - Adult 1 Each (83 mL/Hr) TPN Continuous <Continuous>  Parenteral Nutrition - Adult 1 Each (83 mL/Hr) TPN Continuous <Continuous>  psyllium Powder 1 Packet(s) Oral three times a day  tamsulosin 0.4 milliGRAM(s) Oral daily      PHYSICAL EXAM:  HR: 87 (01-31-20 @ 23:00) (73 - 101)  BP: 104/59 (01-31-20 @ 23:00) (82/52 - 148/71)  BP(mean): 78 (01-31-20 @ 23:00) (62 - 102)  ABP: --  ABP(mean): --  Wt(kg): --  CVP(cm H2O): --          Appearance: NAD	  HEENT:   Normal oral mucosa, PERRL, EOMI	  Lymphatic: No lymphadenopathy , no edema  Cardiovascular: Normal S1 S2, No JVD, No murmurs , Peripheral pulses palpable 2+ bilaterally  Respiratory: Lungs clear to auscultation, normal effort 	  Gastrointestinal:  Soft, Non-tender, +Ileostomy  Skin: No rashes, No ecchymoses, No cyanosis, warm to touch  Musculoskeletal: Normal range of motion, normal strength  Psychiatry:  Mood & affect appropriate  Ext: No edema      LABS:    CARDIAC MARKERS:        01-30 @ 07:01  -  01-31 @ 07:00  --------------------------------------------------------  IN:    fat emulsion (Fish Oil and Plant Based) 20% Infusion: 150 mL    Oral Fluid: 120 mL    Sodium Chloride 0.9% IV Bolus: 1000 mL    TPN (Total Parenteral Nutrition): 1541 mL  Total IN: 2811 mL    OUT:    Ileostomy: 2500 mL    Voided: 2450 mL  Total OUT: 4950 mL    Total NET: -2139 mL      01-31 @ 07:01  -  02-01 @ 00:40  --------------------------------------------------------  IN:    fat emulsion (Fish Oil and Plant Based) 20% Infusion: 145.6 mL    Oral Fluid: 360 mL    TPN (Total Parenteral Nutrition): 1184 mL  Total IN: 1689.6 mL    OUT:    Ileostomy: 1500 mL    Voided: 1000 mL  Total OUT: 2500 mL    Total NET: -810.4 mL          01-31    135  |  96  |  17  ----------------------------<  106<H>  4.0   |  29  |  0.42<L>    Ca    8.3<L>      31 Jan 2020 04:10  Phos  4.2     01-31  Mg     2.0     01-31    TPro  6.1  /  Alb  2.5<L>  /  TBili  0.8  /  DBili  0.4<H>  /  AST  12  /  ALT  27  /  AlkPhos  85  01-31    [ ] Martinez catheter, indication: N/A  Meds: ergocalciferol 34073 Unit(s) Oral every week  fat emulsion (Fish Oil and Plant Based) 20% Infusion 20.8 mL/Hr IV Continuous <Continuous>  Parenteral Nutrition - Adult 1 Each TPN Continuous <Continuous>        HEMATOLOGIC  Meds: enoxaparin Injectable 40 milliGRAM(s) SubCutaneous daily    [x] VTE Prophylaxis                        7.6    10.86 )-----------( 269      ( 31 Jan 2020 04:10 )             23.6       Transfusion     [ ] PRBC   [ ] Platelets   [ ] FFP   [ ] Cryoprecipitate      INFECTIOUS DISEASES  WBC Count: 10.86 K/uL (01-31 @ 04:10)            TELEMETRY: 	SR    ECG:  	  RADIOLOGY:  < from: Xray Ankle Complete 3 Views, Left (01.31.20 @ 17:09) >    EXAM:  ANKLE LEFT (MINIMUM 3 VIEWS)                            PROCEDURE DATE:  01/31/2020            INTERPRETATION:  EXAMINATION: 3 views of left ankle    CLINICAL INFORMATION: Left ankle pain    IMPRESSION:     No acute fracture or dislocation. Preserved joint spaces. Soft tissue swelling.                    KARI SALAZAR M.D., ATTENDING RADIOLOGIST  This document has been electronically signed. Jan 31 2020  9:28PM                < end of copied text >    DIAGNOSTIC TESTING:  [ ] Echocardiogram:  [ ]  Catheterization:  [ ] Stress Test:    OTHER:

## 2020-02-01 NOTE — PROGRESS NOTE ADULT - ASSESSMENT
A/P:  76 y/o M presenting with septic shock and high grade SBO s/p exploratory laparotomy, lysis of adhesions, decompression of bowel via enterotomy w/ primary repair, and Abthera VAC placement on 12/6; s/p take down of Abthera, washout and re-application of the Abthera vac on 12/8. 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 s/p L chest tube placement by IR 12/30 for pleural effusion now s/p VATS, with chest tube insertion for drainage of pleural effusion. RRT called 1/13 AM for hypoxia, patient transferred back to SICU.  Patient continued SOB, chest drain possible obstruction expanding. Patent taken back to OR emergently 1/15 for clot evac and VATS.   -plan for OR Tuesday 2/4  -continue PO diet and full dose TPN        TPN pager [phone], spectra [phone]  discussed with Dr. Chacko and Dr. MELISSA Siegel A/P:  76 y/o M presenting with septic shock and high grade SBO s/p exploratory laparotomy, lysis of adhesions, decompression of bowel via enterotomy w/ primary repair, and Abthera VAC placement on 12/6; s/p take down of Abthera, washout and re-application of the Abthera vac on 12/8. 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 s/p L chest tube placement by IR 12/30 for pleural effusion now s/p VATS, with chest tube insertion for drainage of pleural effusion. RRT called 1/13 AM for hypoxia, patient transferred back to SICU.  Patient continued SOB, chest drain possible obstruction expanding. Patent taken back to OR emergently 1/15 for clot evac and VATS.  CT's removed, awaiting reversal of end ileostomy, scheduled for this week  -plan for OR Tuesday 2/4  -continue PO diet and full dose TPN  - TPN:  210g dextrose, 120g AA, 50g lipids in 2000cc total volume  - hx hyponatremia, hypocalcemia, hypokalemia  - electrolytes as follow:  80 meq NaCl, 60 mmol NaPhos, 40 meq KCl, 14 meq CaGluconate, 12 meq MgSO4  - please obtain ACTH stim test and cortisol level SIMULTANEOUSLY at 8AM  - consider stress dose steroids perioperatively  -daily BMP, Mg, Phos  - strict I's/O's  -continue SICU care    TPN pager [phone], spectra [phone]  discussed with Dr. Chacko and Dr. MELISSA Siegel

## 2020-02-01 NOTE — PROVIDER CONTACT NOTE (OTHER) - ASSESSMENT
Pt noted a red raised rash to inner right groin travelling under scrotum, denies purities or drainage to site.
Patient desaturating to 88% on 4L NC. Patient tachypneic.
Patient sustaining tachycardia to 140s. Resting comfortably in bed. Normotensive. Denies chest pain, dizziness.
Pt A&Ox4, lying in bed, on 3L NC O2, denies any pain, running TPN & lipids, w/ VS as charted.
Pt A&Ox4, sitting up in chair, on 3L NC O2, denies any pain, running TPN & lipids, w/ VS as charted.
Pt c/o of SOB & bradycardic to 34 intermittently. Pt abdomen distended. Pt urine color dark brown.  Pt pleurx catheter has no drainage.
Pt diaphoretic using accessory muscles gasping for breath. O2 sats 50-60s Bp 200/94 all other VSS
Pt was tammy 38 & SOB and then tachycardiac 120s and SOB. Pt was alert and able to talk, but tachypneic
decrease in patient's UO, cc/hr under recommended 0.5-1.0cc/kg/hr guideline
pt hypotensive 79/41 pt has no complaints
pt threw up bilious fluid while coughing, BiPAP removed and pt placed on 6LNC SpO2 98%

## 2020-02-01 NOTE — PROGRESS NOTE ADULT - ATTENDING COMMENTS
Patient seen and examined  Doing well  Tolerating PO intake    Abd is soft, not tender and not distended  Ileostomy is pink, viable and with high output    - Appreciate continued SICU care  - Diet as tolerated  - TPN  - Antimotility agents  - OR planning for Tuesday

## 2020-02-01 NOTE — PROVIDER CONTACT NOTE (OTHER) - NAME OF MD/NP/PA/DO NOTIFIED:
Care Coordinator Note  Arrangments made for patient to have an oncolgist in [place].  Information has been faxed to [facility] Phone [phone]  Fax [phone]  Pt will call me once she has a contact number in [place].       Patient verbalized back understanding of the above information discussed.   Face to face time spent with patient5.  Danica CHAUDHARY RN, OCN  Care Coordinator   Gynecologic Cancer   Office [phone]  Pager [phone] #[phone]  
Argentina BERMUDEZ
Janelle BERMUDEZ
Krissy BERMUDEZ
LARA Bah
LARA Bah. MD Moore
LARA Marquez
LARA Marquez
Rapid response team called

## 2020-02-01 NOTE — PROGRESS NOTE ADULT - ATTENDING COMMENTS
Awake and alert, comfortable  Saturating well on 2L, on his COPD meds  PO/Full strength TPN   urine output responds to  DDAVP, will hold up today as I/O matching  ACTH stimulation test suggest adrenal insufficiency, will obtain ACTH and cortisol level again, avoid systemic steroid during perioperative period because of risk of healing complications  PT/mobilization

## 2020-02-01 NOTE — PROGRESS NOTE ADULT - SUBJECTIVE AND OBJECTIVE BOX
[facility] NUTRITION SUPPORT / TPN -- FOLLOW UP NOTE  --------------------------------------------------------------------------------    24 hour events/subjective:  No acute overnight events.  TPN volume increased yesterday.  4 mcg DDAVP given.  Complained of L ankle pain, xray obtained showing soft tissue swelling.  Tolerating PO diet/supplements without nausea or vomiting.      Diet:  Diet, Mechanical Soft:   No Carb Prosource (1pkg = 15gms Protein)     Qty per Day:  1  Supplement Feeding Modality:  Oral  Ensure Enlive Cans or Servings Per Day:  2       Frequency:  Daily (01-24-20 @ 11:32)      PAST HISTORY  --------------------------------------------------------------------------------  No significant changes to PMH, PSH, FHx, SHx, unless otherwise noted    ALLERGIES & MEDICATIONS  --------------------------------------------------------------------------------  Allergies    IV Contrast (Hives)    Intolerances      Standing Inpatient Medications  albuterol/ipratropium for Nebulization. 3 milliLiter(s) Nebulizer every 6 hours  buDESOnide    Inhalation Suspension 0.5 milliGRAM(s) Inhalation every 12 hours  chlorhexidine 2% Cloths 1 Application(s) Topical <User Schedule>  diphenoxylate/atropine 2 Tablet(s) Oral <User Schedule>  enoxaparin Injectable 40 milliGRAM(s) SubCutaneous daily  ergocalciferol 61257 Unit(s) Oral every week  fat emulsion (Fish Oil and Plant Based) 20% Infusion 20.8 mL/Hr IV Continuous <Continuous>  loperamide 16 milliGRAM(s) Oral <User Schedule>  octreotide  Injectable 100 MICROGram(s) SubCutaneous three times a day  opium Tincture 6 milliGRAM(s) Oral <User Schedule>  pantoprazole    Tablet 40 milliGRAM(s) Oral before breakfast  Parenteral Nutrition - Adult 1 Each TPN Continuous <Continuous>  psyllium Powder 1 Packet(s) Oral three times a day  tamsulosin 0.4 milliGRAM(s) Oral daily    PRN Inpatient Medications  acetaminophen   Tablet .. 975 milliGRAM(s) Oral every 6 hours PRN  diphenhydramine 2%/zinc acetate 0.1% Cream 1 Application(s) Topical two times a day PRN  ondansetron Injectable 4 milliGRAM(s) IV Push every 6 hours PRN      VITALS/PHYSICAL EXAM  --------------------------------------------------------------------------------  T(C): 36.7 (02-01-20 @ 07:00), Max: 37.1 (01-31-20 @ 15:00)  HR: 89 (02-01-20 @ 09:00) (71 - 101)  BP: 120/56 (02-01-20 @ 09:00) (88/53 - 148/71)  RR: 28 (02-01-20 @ 09:00) (18 - 48)  SpO2: 100% (02-01-20 @ 09:00) (96% - 100%)  Wt(kg): --        01-31-20 @ 07:01  -  02-01-20 @ 07:00  --------------------------------------------------------  IN: 2445.4 mL / OUT: 3700 mL / NET: -1254.6 mL       PHYSICAL EXAM:  General: NAD, sitting in bed eating breakfast  Neuro: AAOx3  CHEST: CTAB  CV: Regular rate and rhythm  Abdomen: soft, nontender, nondistended, no rebound or guarding, ostomy pink, intact, draining semisolid  Extremities: Grossly symmetric  PICC site C/D/I      LABS/STUDIES  --------------------------------------------------------------------------------              7.3    10.40 >-----------<  281      [02-01-20 @ 01:43]              23.0     136  |  97  |  26  ----------------------------<  131      [02-01-20 @ 01:43]  4.6   |  30  |  0.45        Ca     8.6     [02-01-20 @ 01:43]      iCa    1.16     [02-01 @ 01:30]      Mg     1.8     [02-01-20 @ 01:43]      Phos  3.8     [02-01-20 @ 01:43]    TPro  6.6  /  Alb  2.6  /  TBili  0.8  /  DBili  0.4  /  AST  15  /  ALT  20  /  AlkPhos  84  [02-01-20 @ 01:43]          Ca ionized  Creatinine Trend:  POC glucoseGlucose, Serum: 131 mg/dL (02-01-20 @ 01:43)  CAPILLARY BLOOD GLUCOSE        PrealbuminPrealbumin, Serum: 18 mg/dL (01-31-20 @ 07:40)  Prealbumin, Serum: 19 mg/dL (01-27-20 @ 04:52)  Prealbumin, Serum: 16 mg/dL (01-24-20 @ 02:53)  Prealbumin, Serum: 13 mg/dL (01-21-20 @ 02:00)  Prealbumin, Serum: 15 mg/dL (01-15-20 @ 09:01)    Triglycerides

## 2020-02-02 LAB
ALBUMIN SERPL ELPH-MCNC: 2.6 G/DL — LOW (ref 3.3–5)
ALP SERPL-CCNC: 99 U/L — SIGNIFICANT CHANGE UP (ref 40–120)
ALT FLD-CCNC: 22 U/L — SIGNIFICANT CHANGE UP (ref 10–45)
ANION GAP SERPL CALC-SCNC: 9 MMOL/L — SIGNIFICANT CHANGE UP (ref 5–17)
AST SERPL-CCNC: 17 U/L — SIGNIFICANT CHANGE UP (ref 10–40)
BILIRUB DIRECT SERPL-MCNC: 0.4 MG/DL — HIGH (ref 0–0.2)
BILIRUB INDIRECT FLD-MCNC: 0.3 MG/DL — SIGNIFICANT CHANGE UP (ref 0.2–1)
BILIRUB SERPL-MCNC: 0.7 MG/DL — SIGNIFICANT CHANGE UP (ref 0.2–1.2)
BUN SERPL-MCNC: 24 MG/DL — HIGH (ref 7–23)
CA-I BLD-SCNC: 1.16 MMOL/L — SIGNIFICANT CHANGE UP (ref 1.12–1.3)
CALCIUM SERPL-MCNC: 8.8 MG/DL — SIGNIFICANT CHANGE UP (ref 8.4–10.5)
CHLORIDE SERPL-SCNC: 94 MMOL/L — LOW (ref 96–108)
CO2 SERPL-SCNC: 30 MMOL/L — SIGNIFICANT CHANGE UP (ref 22–31)
CORTICOSTEROID BINDING GLOBULIN RESULT: 1.9 MG/DL — SIGNIFICANT CHANGE UP
CORTICOSTEROID BINDING GLOBULIN RESULT: 2.2 MG/DL — SIGNIFICANT CHANGE UP
CORTIS F/TOTAL MFR SERPL: 47 % — SIGNIFICANT CHANGE UP
CORTIS F/TOTAL MFR SERPL: 49 % — SIGNIFICANT CHANGE UP
CORTIS SERPL-MCNC: 25 UG/DL — HIGH
CORTIS SERPL-MCNC: 28 UG/DL — HIGH
CORTISOL, FREE RESULT: 12 UG/DL — HIGH
CORTISOL, FREE RESULT: 13 UG/DL — HIGH
CREAT SERPL-MCNC: 0.52 MG/DL — SIGNIFICANT CHANGE UP (ref 0.5–1.3)
GLUCOSE SERPL-MCNC: 103 MG/DL — HIGH (ref 70–99)
HCT VFR BLD CALC: 24.2 % — LOW (ref 39–50)
HCT VFR BLD CALC: 32.1 % — LOW (ref 39–50)
HGB BLD-MCNC: 10.1 G/DL — LOW (ref 13–17)
HGB BLD-MCNC: 7.7 G/DL — LOW (ref 13–17)
MAGNESIUM SERPL-MCNC: 1.7 MG/DL — SIGNIFICANT CHANGE UP (ref 1.6–2.6)
MCHC RBC-ENTMCNC: 30 PG — SIGNIFICANT CHANGE UP (ref 27–34)
MCHC RBC-ENTMCNC: 30.3 PG — SIGNIFICANT CHANGE UP (ref 27–34)
MCHC RBC-ENTMCNC: 31.5 GM/DL — LOW (ref 32–36)
MCHC RBC-ENTMCNC: 31.8 GM/DL — LOW (ref 32–36)
MCV RBC AUTO: 95.3 FL — SIGNIFICANT CHANGE UP (ref 80–100)
MCV RBC AUTO: 95.3 FL — SIGNIFICANT CHANGE UP (ref 80–100)
NRBC # BLD: 0 /100 WBCS — SIGNIFICANT CHANGE UP (ref 0–0)
NRBC # BLD: 0 /100 WBCS — SIGNIFICANT CHANGE UP (ref 0–0)
PHOSPHATE SERPL-MCNC: 4 MG/DL — SIGNIFICANT CHANGE UP (ref 2.5–4.5)
PLATELET # BLD AUTO: 275 K/UL — SIGNIFICANT CHANGE UP (ref 150–400)
PLATELET # BLD AUTO: 315 K/UL — SIGNIFICANT CHANGE UP (ref 150–400)
POTASSIUM SERPL-MCNC: 4.4 MMOL/L — SIGNIFICANT CHANGE UP (ref 3.5–5.3)
POTASSIUM SERPL-SCNC: 4.4 MMOL/L — SIGNIFICANT CHANGE UP (ref 3.5–5.3)
PREALB SERPL-MCNC: 18 MG/DL — LOW (ref 20–40)
PROT SERPL-MCNC: 6.9 G/DL — SIGNIFICANT CHANGE UP (ref 6–8.3)
RBC # BLD: 2.54 M/UL — LOW (ref 4.2–5.8)
RBC # BLD: 3.37 M/UL — LOW (ref 4.2–5.8)
RBC # FLD: 13.5 % — SIGNIFICANT CHANGE UP (ref 10.3–14.5)
RBC # FLD: 13.6 % — SIGNIFICANT CHANGE UP (ref 10.3–14.5)
SODIUM SERPL-SCNC: 133 MMOL/L — LOW (ref 135–145)
TRIGL SERPL-MCNC: 61 MG/DL — SIGNIFICANT CHANGE UP (ref 10–149)
WBC # BLD: 6.7 K/UL — SIGNIFICANT CHANGE UP (ref 3.8–10.5)
WBC # BLD: 8.4 K/UL — SIGNIFICANT CHANGE UP (ref 3.8–10.5)
WBC # FLD AUTO: 6.7 K/UL — SIGNIFICANT CHANGE UP (ref 3.8–10.5)
WBC # FLD AUTO: 8.4 K/UL — SIGNIFICANT CHANGE UP (ref 3.8–10.5)

## 2020-02-02 PROCEDURE — 99232 SBSQ HOSP IP/OBS MODERATE 35: CPT

## 2020-02-02 RX ORDER — MAGNESIUM SULFATE 500 MG/ML
2 VIAL (ML) INJECTION ONCE
Refills: 0 | Status: COMPLETED | OUTPATIENT
Start: 2020-02-02 | End: 2020-02-02

## 2020-02-02 RX ORDER — I.V. FAT EMULSION 20 G/100ML
20.8 EMULSION INTRAVENOUS
Qty: 50 | Refills: 0 | Status: DISCONTINUED | OUTPATIENT
Start: 2020-02-02 | End: 2020-02-03

## 2020-02-02 RX ORDER — SODIUM CHLORIDE 9 MG/ML
1000 INJECTION INTRAMUSCULAR; INTRAVENOUS; SUBCUTANEOUS ONCE
Refills: 0 | Status: COMPLETED | OUTPATIENT
Start: 2020-02-02 | End: 2020-02-02

## 2020-02-02 RX ORDER — ELECTROLYTE SOLUTION,INJ
1 VIAL (ML) INTRAVENOUS
Refills: 0 | Status: DISCONTINUED | OUTPATIENT
Start: 2020-02-02 | End: 2020-02-02

## 2020-02-02 RX ADMIN — ENOXAPARIN SODIUM 40 MILLIGRAM(S): 100 INJECTION SUBCUTANEOUS at 11:36

## 2020-02-02 RX ADMIN — MORPHINE 6 MILLIGRAM(S): 10 SOLUTION ORAL at 12:34

## 2020-02-02 RX ADMIN — SODIUM CHLORIDE 4000 MILLILITER(S): 9 INJECTION INTRAMUSCULAR; INTRAVENOUS; SUBCUTANEOUS at 10:02

## 2020-02-02 RX ADMIN — MORPHINE 6 MILLIGRAM(S): 10 SOLUTION ORAL at 00:40

## 2020-02-02 RX ADMIN — PANTOPRAZOLE SODIUM 40 MILLIGRAM(S): 20 TABLET, DELAYED RELEASE ORAL at 08:31

## 2020-02-02 RX ADMIN — TAMSULOSIN HYDROCHLORIDE 0.4 MILLIGRAM(S): 0.4 CAPSULE ORAL at 11:36

## 2020-02-02 RX ADMIN — Medication 0.5 MILLIGRAM(S): at 18:16

## 2020-02-02 RX ADMIN — Medication 1 APPLICATION(S): at 06:08

## 2020-02-02 RX ADMIN — ERGOCALCIFEROL 50000 UNIT(S): 1.25 CAPSULE ORAL at 11:36

## 2020-02-02 RX ADMIN — Medication 1 EACH: at 17:13

## 2020-02-02 RX ADMIN — Medication 3 MILLILITER(S): at 11:30

## 2020-02-02 RX ADMIN — Medication 2 TABLET(S): at 22:34

## 2020-02-02 RX ADMIN — Medication 3 MILLILITER(S): at 23:51

## 2020-02-02 RX ADMIN — Medication 1 APPLICATION(S): at 17:11

## 2020-02-02 RX ADMIN — OCTREOTIDE ACETATE 100 MICROGRAM(S): 200 INJECTION, SOLUTION INTRAVENOUS; SUBCUTANEOUS at 14:26

## 2020-02-02 RX ADMIN — Medication 16 MILLIGRAM(S): at 22:35

## 2020-02-02 RX ADMIN — MORPHINE 6 MILLIGRAM(S): 10 SOLUTION ORAL at 22:35

## 2020-02-02 RX ADMIN — Medication 0.5 MILLIGRAM(S): at 05:56

## 2020-02-02 RX ADMIN — CHLORHEXIDINE GLUCONATE 1 APPLICATION(S): 213 SOLUTION TOPICAL at 06:09

## 2020-02-02 RX ADMIN — OCTREOTIDE ACETATE 100 MICROGRAM(S): 200 INJECTION, SOLUTION INTRAVENOUS; SUBCUTANEOUS at 06:09

## 2020-02-02 RX ADMIN — Medication 2 TABLET(S): at 08:30

## 2020-02-02 RX ADMIN — Medication 1 PACKET(S): at 22:34

## 2020-02-02 RX ADMIN — MORPHINE 6 MILLIGRAM(S): 10 SOLUTION ORAL at 17:11

## 2020-02-02 RX ADMIN — Medication 16 MILLIGRAM(S): at 17:12

## 2020-02-02 RX ADMIN — Medication 16 MILLIGRAM(S): at 08:32

## 2020-02-02 RX ADMIN — Medication 1 PACKET(S): at 14:26

## 2020-02-02 RX ADMIN — Medication 50 GRAM(S): at 08:35

## 2020-02-02 RX ADMIN — Medication 3 MILLILITER(S): at 00:58

## 2020-02-02 RX ADMIN — Medication 16 MILLIGRAM(S): at 14:24

## 2020-02-02 RX ADMIN — I.V. FAT EMULSION 20.8 ML/HR: 20 EMULSION INTRAVENOUS at 17:12

## 2020-02-02 RX ADMIN — I.V. FAT EMULSION 20.8 ML/HR: 20 EMULSION INTRAVENOUS at 19:36

## 2020-02-02 RX ADMIN — Medication 2 TABLET(S): at 12:33

## 2020-02-02 RX ADMIN — MORPHINE 6 MILLIGRAM(S): 10 SOLUTION ORAL at 08:31

## 2020-02-02 RX ADMIN — Medication 1 PACKET(S): at 06:09

## 2020-02-02 RX ADMIN — OCTREOTIDE ACETATE 100 MICROGRAM(S): 200 INJECTION, SOLUTION INTRAVENOUS; SUBCUTANEOUS at 22:35

## 2020-02-02 RX ADMIN — Medication 2 TABLET(S): at 17:12

## 2020-02-02 RX ADMIN — Medication 3 MILLILITER(S): at 05:56

## 2020-02-02 RX ADMIN — Medication 1 EACH: at 19:36

## 2020-02-02 RX ADMIN — Medication 3 MILLILITER(S): at 18:17

## 2020-02-02 NOTE — PROGRESS NOTE ADULT - SUBJECTIVE AND OBJECTIVE BOX
Mr. Hernandez is comfortable in bed  Denies any nausea, vomiting, fever or chills  Tolerating PO intake   States his breathing was good overnight    ICU Vital Signs Last 24 Hrs  T(C): 37.2 (02 Feb 2020 03:00), Max: 37.6 (01 Feb 2020 11:00)  T(F): 99 (02 Feb 2020 03:00), Max: 99.7 (01 Feb 2020 11:00)  HR: 81 (02 Feb 2020 06:00) (72 - 100)  BP: 98/57 (02 Feb 2020 06:00) (92/55 - 144/62)  BP(mean): 74 (02 Feb 2020 06:00) (71 - 94)  ABP: --  ABP(mean): --  RR: 20 (02 Feb 2020 06:00) (20 - 37)  SpO2: 100% (02 Feb 2020 06:00) (96% - 100%)      I&O's Detail    01 Feb 2020 07:01  -  02 Feb 2020 07:00  --------------------------------------------------------  IN:    fat emulsion (Fish Oil and Plant Based) 20% Infusion: 270.4 mL    TPN (Total Parenteral Nutrition): 1909 mL  Total IN: 2179.4 mL    OUT:    Ileostomy: 1800 mL    Voided: 2025 mL  Total OUT: 3825 mL    Total NET: -1645.6 mL      On exam: awake, alert and oriented  Breathing comfortably on supplemental O2 via NC  Abd is soft, not tender and not distended  Ileostomy is pink, viable, and with high output

## 2020-02-02 NOTE — PROGRESS NOTE ADULT - SUBJECTIVE AND OBJECTIVE BOX
Subjective: Patient seen and examined. No new events except as noted.   Remains in ICU   feels well   plan for OR Tuesday       REVIEW OF SYSTEMS:    CONSTITUTIONAL:+ weakness, fevers or chills  EYES/ENT: No visual changes;  No vertigo or throat pain   NECK: No pain or stiffness  RESPIRATORY: No cough, wheezing, hemoptysis; No shortness of breath  CARDIOVASCULAR: No chest pain or palpitations  GASTROINTESTINAL: No abdominal or epigastric pain. No nausea, vomiting, or hematemesis; No diarrhea or constipation. No melena or hematochezia.  GENITOURINARY: No dysuria, frequency or hematuria  NEUROLOGICAL: No numbness or weakness  SKIN: No itching, burning, rashes, or lesions   All other review of systems is negative unless indicated above.    MEDICATIONS:  MEDICATIONS  (STANDING):  albuterol/ipratropium for Nebulization. 3 milliLiter(s) Nebulizer every 6 hours  buDESOnide    Inhalation Suspension 0.5 milliGRAM(s) Inhalation every 12 hours  chlorhexidine 2% Cloths 1 Application(s) Topical <User Schedule>  diphenoxylate/atropine 2 Tablet(s) Oral <User Schedule>  enoxaparin Injectable 40 milliGRAM(s) SubCutaneous daily  ergocalciferol 89547 Unit(s) Oral every week  fat emulsion (Fish Oil and Plant Based) 20% Infusion 20.8 mL/Hr (20.8 mL/Hr) IV Continuous <Continuous>  loperamide 16 milliGRAM(s) Oral <User Schedule>  nystatin/triamcinolone Cream 1 Application(s) Topical two times a day  octreotide  Injectable 100 MICROGram(s) SubCutaneous three times a day  opium Tincture 6 milliGRAM(s) Oral <User Schedule>  pantoprazole    Tablet 40 milliGRAM(s) Oral before breakfast  Parenteral Nutrition - Adult 1 Each (83 mL/Hr) TPN Continuous <Continuous>  Parenteral Nutrition - Adult 1 Each (83 mL/Hr) TPN Continuous <Continuous>  psyllium Powder 1 Packet(s) Oral three times a day  tamsulosin 0.4 milliGRAM(s) Oral daily      PHYSICAL EXAM:  T(C): 37.5 (02-02-20 @ 07:00), Max: 37.6 (02-01-20 @ 11:00)  HR: 84 (02-02-20 @ 09:00) (72 - 100)  BP: 104/55 (02-02-20 @ 09:00) (86/52 - 144/62)  RR: 25 (02-02-20 @ 09:00) (20 - 37)  SpO2: 98% (02-02-20 @ 09:00) (96% - 100%)  Wt(kg): --  I&O's Summary    01 Feb 2020 07:01  -  02 Feb 2020 07:00  --------------------------------------------------------  IN: 2262.4 mL / OUT: 3825 mL / NET: -1562.6 mL    02 Feb 2020 07:01  -  02 Feb 2020 10:58  --------------------------------------------------------  IN: 1299 mL / OUT: 500 mL / NET: 799 mL          Appearance: NAD  HEENT:   Normal oral mucosa, PERRL, EOMI	  Lymphatic: No lymphadenopathy , no edema  Cardiovascular: Normal S1 S2, No JVD, No murmurs , Peripheral pulses palpable 2+ bilaterally  Respiratory: Lungs clear to auscultation, normal effort 	  Gastrointestinal:  Soft, Non-tender, +Ileostomy   Skin: No rashes, No ecchymoses, No cyanosis, warm to touch  Musculoskeletal: Normal range of motion, normal strength  Psychiatry:  Mood & affect appropriate  Ext: No edema      LABS:    CARDIAC MARKERS:                                7.7    8.40  )-----------( 315      ( 02 Feb 2020 09:47 )             24.2     02-02    133<L>  |  94<L>  |  24<H>  ----------------------------<  103<H>  4.4   |  30  |  0.52    Ca    8.8      02 Feb 2020 01:41  Phos  4.0     02-02  Mg     1.7     02-02    TPro  6.9  /  Alb  2.6<L>  /  TBili  0.7  /  DBili  0.4<H>  /  AST  17  /  ALT  22  /  AlkPhos  99  02-02    proBNP:   Lipid Profile:   HgA1c:   TSH:             TELEMETRY: 	SR    ECG:  	  RADIOLOGY:   DIAGNOSTIC TESTING:  [ ] Echocardiogram:  [ ]  Catheterization:  [ ] Stress Test:    OTHER:

## 2020-02-02 NOTE — PROGRESS NOTE ADULT - SUBJECTIVE AND OBJECTIVE BOX
[facility] NUTRITION SUPPORT / TPN -- FOLLOW UP NOTE  --------------------------------------------------------------------------------    24 hour events/subjective:  No acute overnight events.  c/o L ankle pain that is now in brace.  Denies N/V.  Tolerating diet w supplements, remains on full dose TPN while awaiting surgery 2/4.    Diet:  Diet, Mechanical Soft:   No Carb Prosource (1pkg = 15gms Protein)     Qty per Day:  1  Supplement Feeding Modality:  Oral  Ensure Enlive Cans or Servings Per Day:  2       Frequency:  Daily (01-24-20 @ 11:32)      PAST HISTORY  --------------------------------------------------------------------------------  No significant changes to PMH, PSH, FHx, SHx, unless otherwise noted    ALLERGIES & MEDICATIONS  --------------------------------------------------------------------------------  Allergies    IV Contrast (Hives)    Intolerances      Standing Inpatient Medications  albuterol/ipratropium for Nebulization. 3 milliLiter(s) Nebulizer every 6 hours  buDESOnide    Inhalation Suspension 0.5 milliGRAM(s) Inhalation every 12 hours  chlorhexidine 2% Cloths 1 Application(s) Topical <User Schedule>  diphenoxylate/atropine 2 Tablet(s) Oral <User Schedule>  enoxaparin Injectable 40 milliGRAM(s) SubCutaneous daily  ergocalciferol 10521 Unit(s) Oral every week  fat emulsion (Fish Oil and Plant Based) 20% Infusion 20.8 mL/Hr IV Continuous <Continuous>  loperamide 16 milliGRAM(s) Oral <User Schedule>  nystatin/triamcinolone Cream 1 Application(s) Topical two times a day  octreotide  Injectable 100 MICROGram(s) SubCutaneous three times a day  opium Tincture 6 milliGRAM(s) Oral <User Schedule>  pantoprazole    Tablet 40 milliGRAM(s) Oral before breakfast  Parenteral Nutrition - Adult 1 Each TPN Continuous <Continuous>  psyllium Powder 1 Packet(s) Oral three times a day  tamsulosin 0.4 milliGRAM(s) Oral daily    PRN Inpatient Medications  acetaminophen   Tablet .. 975 milliGRAM(s) Oral every 6 hours PRN  diphenhydramine 2%/zinc acetate 0.1% Cream 1 Application(s) Topical two times a day PRN  ondansetron Injectable 4 milliGRAM(s) IV Push every 6 hours PRN      VITALS/PHYSICAL EXAM  --------------------------------------------------------------------------------  T(C): 37.5 (02-02-20 @ 07:00), Max: 37.6 (02-01-20 @ 11:00)  HR: 79 (02-02-20 @ 08:00) (72 - 100)  BP: 97/50 (02-02-20 @ 08:00) (86/52 - 144/62)  RR: 24 (02-02-20 @ 08:00) (20 - 37)  SpO2: 99% (02-02-20 @ 08:00) (96% - 100%)  Wt(kg): --        02-01-20 @ 07:01  -  02-02-20 @ 07:00  --------------------------------------------------------  IN: 2262.4 mL / OUT: 3825 mL / NET: -1562.6 mL    02-02-20 @ 07:01  -  02-02-20 @ 09:05  --------------------------------------------------------  IN: 133 mL / OUT: 200 mL / NET: -67 mL      Physical Exam:     General: NAD, sitting comfortably in bed eating breakfast  Neuro: AAOx3  CHEST: CTAB  CV: Regular rate and rhythm  Abdomen: soft, nontender, nondistended, no rebound or guarding, ostomy pink and viable with gas and liquid stool  Extremities: Grossly symmetric  PICC site C/D/I  	        LABS/STUDIES  --------------------------------------------------------------------------------              10.1   6.70  >-----------<  275      [02-02-20 @ 01:41]              32.1     133  |  94  |  24  ----------------------------<  103      [02-02-20 @ 01:41]  4.4   |  30  |  0.52        Ca     8.8     [02-02-20 @ 01:41]      iCa    1.16     [02-02 @ 01:43]      Mg     1.7     [02-02-20 @ 01:41]      Phos  4.0     [02-02-20 @ 01:41]    TPro  6.9  /  Alb  2.6  /  TBili  0.7  /  DBili  0.4  /  AST  17  /  ALT  22  /  AlkPhos  99  [02-02-20 @ 01:41]          Ca ionized  Creatinine Trend:  POC glucoseGlucose, Serum: 103 mg/dL (02-02-20 @ 01:41)  CAPILLARY BLOOD GLUCOSE        PrealbuminPrealbumin, Serum: 18 mg/dL (01-31-20 @ 07:40)  Prealbumin, Serum: 19 mg/dL (01-27-20 @ 04:52)  Prealbumin, Serum: 16 mg/dL (01-24-20 @ 02:53)  Prealbumin, Serum: 13 mg/dL (01-21-20 @ 02:00)  Prealbumin, Serum: 15 mg/dL (01-15-20 @ 09:01)    Triglycerides

## 2020-02-02 NOTE — PROGRESS NOTE ADULT - ATTENDING COMMENTS
Awake and alert, comfortable  Saturating well on 2L, on his COPD meds  PO/Full strength TPN   Another one L bolus to match his I/O  ACTH stimulation test suggest no adrenal insufficiency  PT/mobilization  Possible reversal of ileostomy on Tuesday

## 2020-02-02 NOTE — PROGRESS NOTE ADULT - SUBJECTIVE AND OBJECTIVE BOX
SURGICAL INTENSIVE CARE UNIT DAILY PROGRESS NOTE    24 HOUR EVENTS:   - Nystatin cream for perineal rash.   - No acute events overnight.     HPI:  75y Male past medical history of COPD and EtOH dependence who presented on 12/6/2019 with abdominal pain, nausea, hematemesis, and poor PO intake for ~2-3 days. In the ED, he was found to be hypotensive & tachycardic. Labs revealed an CHI and lactic acidosis. Imaging revealed a high grade SBO in the RLQ on CT scan. Hospital course is as follows:  12/06 - s/p exploratory laparotomy, lysis of adhesions, decompression of bowel via enterotomy w/ primary repair, and Abthera VAC placement as the distal 50% of the bowel appeared dusky but was still viable, admitted to SICU post-operatively as he was on vasopressor support and was left intubated  12/08 - s/p re-exploration, proximal 165 cm of small bowel appeared pink & viable but beyond that had patchy areas of ischemia so decision was made to give the bowel more time to demarcate before resecting in order to preserve as much small bowel as possible so Abthera VAC was replaced  12/09 - s/p re-exploration, small bowel resection of 150 cm (150 cm remaining), ileocecetomy, end ileostomy, mucous fistula, and abdominal closure  12/11 - extubated to BiPAP, noted to be in SVT that was rate controlled w/ metoprolol  12/14 - started on TPN  12/15 - noted to have spontaneous left pneumothorax s/p left pigtail catheter placement, noted to be in SVT again so amiodarone started, high ileostomy output noted so concern for short bowel syndrome  12/16 - amiodarone discontinued, started on beta blocker with metoprolol  12/18 - started Lomotil and ileostomy repletions with IV fluids  12/19 - started Imodium, left pigtail catheter was placed to water seal but pneumothorax noted on follow-up CXR so placed back to suction  12/20 - started tincture of opium, concern for aspiration so changed diet from regular to dysphagia 1 pureed with nectar-thickened liquids  12/22 - left pigtail catheter placed to water seal with no pneumothorax noted on follow-up CXR, CT chest with moderate left pleural effusion not being drained by pigtail catheter  12/23 - 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  12/24 - left pigtail catheter discontinued  12/28 - started Ancef for erythematous midline wound  12/29 - beta blocker discontinued for intermittent episodes of hypotension  12/30 - left pigtail catheter placement by IR with drainage of serous transudative fluid  01/02 - FEES demonstrating penetration with thin liquids so patient kept on dysphagia 1 pureed with nectar-thickened liquids  01/07 - Transferred to floors  01/10 - s/p left VATS w/ PleurX catheter placement  01/11 - evaluated by speech & swallow, advanced diet to mechanical soft with thin liquids  01/12 - PleurX catheter placed to water seal with on pneumothorax on follow-up CXR  01/13 - RRT for hypoxemia, placed on BiPAP, PleurX catheter placed back to suction, started vancomycin & Zosyn for possible HCAP  01/14 - noted to have minimal output from PleurX catheter, lung ultrasound with large left pleural effusion concerning for hemothorax, multiple episodes of bradycardia secondary to hypoxemia, remains on BiPAP  01/15 - worsening respiratory distress requiring intubation, hypotensive requiring vasopressor support, CT chest with large left hemothorax w/ trace pneumothorax & extensive subcutaneous emphysema so taken to OR emergently for left VATS, evacuation fo 2 L of clot, and placement of two left chest tubes  01/16 - extubated, weaned off vasopressor support  01/17 - two left chest tubes placed to water seal with no pneumothorax noted on follow-up CXR  02/01- Remains ileostomy reversal planning, chest tubes discontinued.     NEURO: AOX3.     RESPIRATORY  RR: 33 (02-02-20 @ 01:00) (20 - 48)  SpO2: 100% (02-02-20 @ 01:00) (96% - 100%)    CARDIOVASCULAR  HR: 85 (02-02-20 @ 01:00) (71 - 98)  BP: 117/56 (02-02-20 @ 01:00) (88/53 - 144/62)  BP(mean): 80 (02-02-20 @ 01:00) (64 - 94)    GI/NUTRITION  Ileostomy viable.     GENITOURINARY  I&O's Detail    01-31 @ 07:01  -  02-01 @ 07:00  --------------------------------------------------------  IN:    fat emulsion (Fish Oil and Plant Based) 20% Infusion: 270.4 mL    Oral Fluid: 360 mL    Solution: 50 mL    TPN (Total Parenteral Nutrition): 1848 mL  Total IN: 2528.4 mL    OUT:    Ileostomy: 2300 mL    Voided: 1400 mL  Total OUT: 3700 mL    Total NET: -1171.6 mL      02-01 @ 07:01  -  02-02 @ 02:09  --------------------------------------------------------  IN:    fat emulsion (Fish Oil and Plant Based) 20% Infusion: 187.2 mL    TPN (Total Parenteral Nutrition): 1494 mL  Total IN: 1681.2 mL    OUT:    Ileostomy: 1200 mL    Voided: 800 mL  Total OUT: 2000 mL    Total NET: -318.8 mL          02-01    136  |  97  |  26<H>  ----------------------------<  131<H>  4.6   |  30  |  0.45<L>    Ca    8.6      01 Feb 2020 01:43  Phos  3.8     02-01  Mg     1.8     02-01    TPro  6.6  /  Alb  2.6<L>  /  TBili  0.8  /  DBili  0.4<H>  /  AST  15  /  ALT  20  /  AlkPhos  84  02-01      HEMATOLOGIC                        10.1   6.70  )-----------( 275      ( 02 Feb 2020 01:41 )             32.1

## 2020-02-02 NOTE — PROGRESS NOTE ADULT - ASSESSMENT
73 y/o male presenting with high grade SBO s/p exploratory laparotomy, lysis of adhesions, decompression of bowel via enterotomy w/ primary repair, & Abthera VAC placement (12/06/2019); RTOR for re-exploration w/ Abthera VAC replacement (12/08/2019) to allow for demarcation of ischemic small bowel; RTOR for re-exploration, small bowel resection of 150 cm (150 cm remaining), ileocecetomy, end ileostomy, mucous fistula, and abdominal closure (12/10/2019); hospital course complicated by SVT, short bowel syndrome requiring repletions w/ IV fluids, malnutrition requiring TPN, intermittent episodes of hypotension, spontaneous left pneumothorax s/p pigtail catheter (12/15/2019-12/24/2019), left pleural effusion s/p pigtail catheter w/ IR (12/30/2019), dysphagia, and oral HSV lesions, placement of Pleurx on 1/10, now re-admitted to SICU with acute hypoxic respiratory failure s/p repeat VATS with 2 liter hemothorax removed. Respiratory failure resolved, now stable in SICU.    Neuro: no acute issues  - Pain control as needed with acetaminophen Pro Re Samantha.     Resp: COPD, spontaneous left pneumothorax s/p pigtail catheter, left pleural effusion s/p Pleurx 1/10.  Intubated for respiratory distress s/p VATS 01/15 with 2 Liter of blood evacuation s/p removal of chest tubes   - Pulmicort and Duoneb for COPD  - Will need outpatient follow-up with a pulmonologist for his COPD as patient does not currently have one  -F/u repeat AM CXR    CV: no acute issues  - Monitor hemodynamics, I&Os    GI: s/p exploratory laparotomy, [place], decompression of bowel via enterotomy w/ primary repair, & Abthera VAC placement (12/06/2019); re-exploration w/ ABthera VAC replacement (12/08/2019); re-exploration, small bowel resection of 150 cm (150 cm remaining), ileocecectomy end ileostomy, mucous fistula, and abdominal closure (12/10/2019); short bowel syndrome, malnutrition, dysphagia  - Mechanical soft diet + TPN  - Monitor ostomy output 1.5L past 24hrs  - Continue Lomotil, tincture of opium, loperamide, metamucil, and octreotide for short bowel syndrome  - Protonix to decrease gastric secretions  - Continue Vitamin D supplementation    Renal: polyuria 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     Heme: anemia likely secondary to malnutrition / malabsorption  - Monitor CBC and coags   - Lovenox for VTE prophylaxis    ID: oral HSV lesions (s/p acyclovir 12/23/2019-01/02/2020); completed course of meropenem for Zosyn-resistant Pseudomonas PNA   - Monitor for fever, culture if febrile    Endo: possible adrenal insufficiency  - Monitor glucose on BMPs   - Cosyntropin test indicative of adrenal insufficiency    Dispo: Surgical Intensive Care Unit.

## 2020-02-02 NOTE — PROGRESS NOTE ADULT - ASSESSMENT
75 y/o male presenting with high grade SBO s/p exploratory laparotomy, lysis of adhesions, decompression of bowel via enterotomy w/ primary repair, & Abthera VAC placement (12/06/2019); RTOR for re-exploration w/ Abthera VAC replacement (12/08/2019) to allow for demarcation of ischemic small bowel; RTOR for re-exploration, small bowel resection of 150 cm (150 cm remaining), ileocecetomy, end ileostomy, mucous fistula, and abdominal closure (12/10/2019); hospital course complicated by SVT, short bowel syndrome requiring repletions w/ IV fluids, malnutrition requiring TPN, intermittent episodes of hypotension, spontaneous left pneumothorax s/p pigtail catheter (12/15/2019-12/24/2019), left pleural effusion s/p pigtail catheter w/ IR (12/30/2019), dysphagia, and oral HSV lesions, placement of Pleurx on 1/10, now re-admitted to SICU with acute hypoxic respiratory failure s/p repeat VATS with 2 liter hemothorax removed. Respiratory failure resolved, now stable in SICU.      - Appreciate continued SICU care  - OR planning for Tuesday

## 2020-02-02 NOTE — PROGRESS NOTE ADULT - ASSESSMENT
A/P:  76 y/o M presenting with septic shock and high grade SBO s/p exploratory laparotomy, lysis of adhesions, decompression of bowel via enterotomy w/ primary repair, and Abthera VAC placement on 12/6; s/p take down of Abthera, washout and re-application of the Abthera vac on 12/8. 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 s/p L chest tube placement by IR 12/30 for pleural effusion now s/p VATS, with chest tube insertion for drainage of pleural effusion. RRT called 1/13 AM for hypoxia, patient transferred back to SICU.  Patient continued SOB, chest drain possible obstruction expanding. Patent taken back to OR emergently 1/15 for clot evac and VATS.  CT's removed, awaiting reversal of end ileostomy, scheduled for this week  -plan for OR Tuesday 2/4  -continue PO diet and full dose TPN  - TPN:  210g dextrose, 120g AA, 50g lipids in 2000cc total volume  - hx hyponatremia, hypocalcemia, hypokalemia  - electrolytes as follow:  80 meq NaCl, 60 mmol NaPhos, 40 meq KCl, 14 meq CaGluconate, 12 meq MgSO4  - consider stress dose steroids  -daily BMP, Mg, Phos  - strict I's/O's  -continue SICU care    TPN pager [phone], spectra [phone]  discussed with Dr. MELISSA Siegel A/P:  76 y/o M presenting with septic shock and high grade SBO s/p exploratory laparotomy, lysis of adhesions, decompression of bowel via enterotomy w/ primary repair, and Abthera VAC placement on 12/6; s/p take down of Abthera, washout and re-application of the Abthera vac on 12/8. 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 s/p L chest tube placement by IR 12/30 for pleural effusion now s/p VATS, with chest tube insertion for drainage of pleural effusion. RRT called 1/13 AM for hypoxia, patient transferred back to SICU.  Patient continued SOB, chest drain possible obstruction expanding. Patent taken back to OR emergently 1/15 for clot evac and VATS.  CT's removed, awaiting reversal of end ileostomy, scheduled for this week  -plan for OR Tuesday 2/4  -continue PO diet and full dose TPN  - TPN:  210g dextrose, 120g AA, 50g lipids in 2000cc total volume  - hx hyponatremia, hypocalcemia, hypokalemia  - electrolytes as follow:  80 meq NaCl, 60 mmol NaPhos, 40 meq KCl, 14 meq CaGluconate, 12 meq MgSO4  - f/u ACTH/cortisol from this AM  - consider stress dose steroids  -daily BMP, Mg, Phos  - strict I's/O's  -continue SICU care    TPN pager [phone], spectra [phone]  discussed with Dr. MELISSA Siegel A/P:  76 y/o M presenting with septic shock and high grade SBO s/p exploratory laparotomy, lysis of adhesions, decompression of bowel via enterotomy w/ primary repair, and Abthera VAC placement on 12/6; s/p take down of Abthera, washout and re-application of the Abthera vac on 12/8. 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 s/p L chest tube placement by IR 12/30 for pleural effusion now s/p VATS, with chest tube insertion for drainage of pleural effusion. RRT called 1/13 AM for hypoxia, patient transferred back to SICU.  Patient continued SOB, chest drain possible obstruction expanding. Patent taken back to OR emergently 1/15 for clot evac and VATS.  CT's removed, awaiting reversal of end ileostomy, scheduled for this week  -plan for OR Tuesday 2/4  -continue PO diet and full dose TPN  - TPN:  210g dextrose, 120g AA, 50g lipids in 2000cc total volume  - hx hyponatremia, hypocalcemia, hypokalemia  - electrolytes as follow:  80 meq NaCl, 60 mmol NaPhos, 40 meq KCl, 14 meq CaGluconate  - hypomagnesemia - MgSO4 increased to 14 meq  - f/u ACTH/cortisol from this AM  - consider stress dose steroids  -daily BMP, Mg, Phos  - strict I's/O's  -continue SICU care    TPN pager [phone], spectra [phone]  discussed with Dr. MELISSA Siegel

## 2020-02-03 ENCOUNTER — TRANSCRIPTION ENCOUNTER (OUTPATIENT)
Age: 75
End: 2020-02-03

## 2020-02-03 LAB
ACTH SER-ACNC: 24 PG/ML — SIGNIFICANT CHANGE UP (ref 7.2–63.3)
ALBUMIN SERPL ELPH-MCNC: 2.6 G/DL — LOW (ref 3.3–5)
ALBUMIN SERPL ELPH-MCNC: 2.8 G/DL — LOW (ref 3.3–5)
ALP SERPL-CCNC: 119 U/L — SIGNIFICANT CHANGE UP (ref 40–120)
ALP SERPL-CCNC: 124 U/L — HIGH (ref 40–120)
ALT FLD-CCNC: 29 U/L — SIGNIFICANT CHANGE UP (ref 10–45)
ALT FLD-CCNC: 30 U/L — SIGNIFICANT CHANGE UP (ref 10–45)
ANION GAP SERPL CALC-SCNC: 11 MMOL/L — SIGNIFICANT CHANGE UP (ref 5–17)
ANION GAP SERPL CALC-SCNC: 9 MMOL/L — SIGNIFICANT CHANGE UP (ref 5–17)
APTT BLD: 43.1 SEC — HIGH (ref 27.5–36.3)
AST SERPL-CCNC: 20 U/L — SIGNIFICANT CHANGE UP (ref 10–40)
AST SERPL-CCNC: 22 U/L — SIGNIFICANT CHANGE UP (ref 10–40)
BILIRUB DIRECT SERPL-MCNC: 0.3 MG/DL — HIGH (ref 0–0.2)
BILIRUB DIRECT SERPL-MCNC: 0.4 MG/DL — HIGH (ref 0–0.2)
BILIRUB INDIRECT FLD-MCNC: 0.2 MG/DL — SIGNIFICANT CHANGE UP (ref 0.2–1)
BILIRUB INDIRECT FLD-MCNC: 0.4 MG/DL — SIGNIFICANT CHANGE UP (ref 0.2–1)
BILIRUB SERPL-MCNC: 0.6 MG/DL — SIGNIFICANT CHANGE UP (ref 0.2–1.2)
BILIRUB SERPL-MCNC: 0.7 MG/DL — SIGNIFICANT CHANGE UP (ref 0.2–1.2)
BLD GP AB SCN SERPL QL: NEGATIVE — SIGNIFICANT CHANGE UP
BUN SERPL-MCNC: 27 MG/DL — HIGH (ref 7–23)
BUN SERPL-MCNC: 28 MG/DL — HIGH (ref 7–23)
CA-I BLD-SCNC: 1.12 MMOL/L — SIGNIFICANT CHANGE UP (ref 1.12–1.3)
CALCIUM SERPL-MCNC: 8.4 MG/DL — SIGNIFICANT CHANGE UP (ref 8.4–10.5)
CALCIUM SERPL-MCNC: 8.5 MG/DL — SIGNIFICANT CHANGE UP (ref 8.4–10.5)
CHLORIDE SERPL-SCNC: 100 MMOL/L — SIGNIFICANT CHANGE UP (ref 96–108)
CHLORIDE SERPL-SCNC: 99 MMOL/L — SIGNIFICANT CHANGE UP (ref 96–108)
CO2 SERPL-SCNC: 26 MMOL/L — SIGNIFICANT CHANGE UP (ref 22–31)
CO2 SERPL-SCNC: 28 MMOL/L — SIGNIFICANT CHANGE UP (ref 22–31)
CREAT SERPL-MCNC: 0.42 MG/DL — LOW (ref 0.5–1.3)
CREAT SERPL-MCNC: 0.48 MG/DL — LOW (ref 0.5–1.3)
GLUCOSE SERPL-MCNC: 117 MG/DL — HIGH (ref 70–99)
GLUCOSE SERPL-MCNC: 123 MG/DL — HIGH (ref 70–99)
HCT VFR BLD CALC: 24.7 % — LOW (ref 39–50)
HCT VFR BLD CALC: 25.1 % — LOW (ref 39–50)
HGB BLD-MCNC: 7.7 G/DL — LOW (ref 13–17)
HGB BLD-MCNC: 7.7 G/DL — LOW (ref 13–17)
INR BLD: 1.14 RATIO — SIGNIFICANT CHANGE UP (ref 0.88–1.16)
MAGNESIUM SERPL-MCNC: 1.9 MG/DL — SIGNIFICANT CHANGE UP (ref 1.6–2.6)
MAGNESIUM SERPL-MCNC: 1.9 MG/DL — SIGNIFICANT CHANGE UP (ref 1.6–2.6)
MCHC RBC-ENTMCNC: 29.5 PG — SIGNIFICANT CHANGE UP (ref 27–34)
MCHC RBC-ENTMCNC: 30.2 PG — SIGNIFICANT CHANGE UP (ref 27–34)
MCHC RBC-ENTMCNC: 30.7 GM/DL — LOW (ref 32–36)
MCHC RBC-ENTMCNC: 31.2 GM/DL — LOW (ref 32–36)
MCV RBC AUTO: 96.2 FL — SIGNIFICANT CHANGE UP (ref 80–100)
MCV RBC AUTO: 96.9 FL — SIGNIFICANT CHANGE UP (ref 80–100)
NRBC # BLD: 0 /100 WBCS — SIGNIFICANT CHANGE UP (ref 0–0)
NRBC # BLD: 0 /100 WBCS — SIGNIFICANT CHANGE UP (ref 0–0)
PHOSPHATE SERPL-MCNC: 3.8 MG/DL — SIGNIFICANT CHANGE UP (ref 2.5–4.5)
PHOSPHATE SERPL-MCNC: 3.8 MG/DL — SIGNIFICANT CHANGE UP (ref 2.5–4.5)
PLATELET # BLD AUTO: 312 K/UL — SIGNIFICANT CHANGE UP (ref 150–400)
PLATELET # BLD AUTO: 319 K/UL — SIGNIFICANT CHANGE UP (ref 150–400)
POTASSIUM SERPL-MCNC: 4.5 MMOL/L — SIGNIFICANT CHANGE UP (ref 3.5–5.3)
POTASSIUM SERPL-MCNC: 4.7 MMOL/L — SIGNIFICANT CHANGE UP (ref 3.5–5.3)
POTASSIUM SERPL-SCNC: 4.5 MMOL/L — SIGNIFICANT CHANGE UP (ref 3.5–5.3)
POTASSIUM SERPL-SCNC: 4.7 MMOL/L — SIGNIFICANT CHANGE UP (ref 3.5–5.3)
PREALB SERPL-MCNC: 19 MG/DL — LOW (ref 20–40)
PROT SERPL-MCNC: 6.5 G/DL — SIGNIFICANT CHANGE UP (ref 6–8.3)
PROT SERPL-MCNC: 7 G/DL — SIGNIFICANT CHANGE UP (ref 6–8.3)
PROTHROM AB SERPL-ACNC: 13 SEC — HIGH (ref 10–12.9)
RBC # BLD: 2.55 M/UL — LOW (ref 4.2–5.8)
RBC # BLD: 2.61 M/UL — LOW (ref 4.2–5.8)
RBC # FLD: 13.4 % — SIGNIFICANT CHANGE UP (ref 10.3–14.5)
RBC # FLD: 13.6 % — SIGNIFICANT CHANGE UP (ref 10.3–14.5)
RH IG SCN BLD-IMP: POSITIVE — SIGNIFICANT CHANGE UP
SODIUM SERPL-SCNC: 136 MMOL/L — SIGNIFICANT CHANGE UP (ref 135–145)
SODIUM SERPL-SCNC: 137 MMOL/L — SIGNIFICANT CHANGE UP (ref 135–145)
TRIGL SERPL-MCNC: 53 MG/DL — SIGNIFICANT CHANGE UP (ref 10–149)
TRIGL SERPL-MCNC: 59 MG/DL — SIGNIFICANT CHANGE UP (ref 10–149)
WBC # BLD: 8.7 K/UL — SIGNIFICANT CHANGE UP (ref 3.8–10.5)
WBC # BLD: 9.78 K/UL — SIGNIFICANT CHANGE UP (ref 3.8–10.5)
WBC # FLD AUTO: 8.7 K/UL — SIGNIFICANT CHANGE UP (ref 3.8–10.5)
WBC # FLD AUTO: 9.78 K/UL — SIGNIFICANT CHANGE UP (ref 3.8–10.5)

## 2020-02-03 PROCEDURE — 99233 SBSQ HOSP IP/OBS HIGH 50: CPT

## 2020-02-03 PROCEDURE — 99232 SBSQ HOSP IP/OBS MODERATE 35: CPT

## 2020-02-03 RX ORDER — MAGNESIUM SULFATE 500 MG/ML
2 VIAL (ML) INJECTION ONCE
Refills: 0 | Status: COMPLETED | OUTPATIENT
Start: 2020-02-03 | End: 2020-02-03

## 2020-02-03 RX ORDER — ELECTROLYTE SOLUTION,INJ
1 VIAL (ML) INTRAVENOUS
Refills: 0 | Status: DISCONTINUED | OUTPATIENT
Start: 2020-02-03 | End: 2020-02-03

## 2020-02-03 RX ORDER — I.V. FAT EMULSION 20 G/100ML
20.8 EMULSION INTRAVENOUS
Qty: 50 | Refills: 0 | Status: DISCONTINUED | OUTPATIENT
Start: 2020-02-03 | End: 2020-02-04

## 2020-02-03 RX ADMIN — OCTREOTIDE ACETATE 100 MICROGRAM(S): 200 INJECTION, SOLUTION INTRAVENOUS; SUBCUTANEOUS at 14:18

## 2020-02-03 RX ADMIN — Medication 16 MILLIGRAM(S): at 13:12

## 2020-02-03 RX ADMIN — ENOXAPARIN SODIUM 40 MILLIGRAM(S): 100 INJECTION SUBCUTANEOUS at 13:11

## 2020-02-03 RX ADMIN — OCTREOTIDE ACETATE 100 MICROGRAM(S): 200 INJECTION, SOLUTION INTRAVENOUS; SUBCUTANEOUS at 21:31

## 2020-02-03 RX ADMIN — Medication 2 TABLET(S): at 13:11

## 2020-02-03 RX ADMIN — Medication 3 MILLILITER(S): at 11:38

## 2020-02-03 RX ADMIN — Medication 2 TABLET(S): at 08:15

## 2020-02-03 RX ADMIN — Medication 16 MILLIGRAM(S): at 17:38

## 2020-02-03 RX ADMIN — Medication 50 GRAM(S): at 22:51

## 2020-02-03 RX ADMIN — Medication 3 MILLILITER(S): at 17:58

## 2020-02-03 RX ADMIN — Medication 1 APPLICATION(S): at 17:37

## 2020-02-03 RX ADMIN — Medication 3 MILLILITER(S): at 05:03

## 2020-02-03 RX ADMIN — PANTOPRAZOLE SODIUM 40 MILLIGRAM(S): 20 TABLET, DELAYED RELEASE ORAL at 08:15

## 2020-02-03 RX ADMIN — TAMSULOSIN HYDROCHLORIDE 0.4 MILLIGRAM(S): 0.4 CAPSULE ORAL at 13:11

## 2020-02-03 RX ADMIN — MORPHINE 6 MILLIGRAM(S): 10 SOLUTION ORAL at 13:12

## 2020-02-03 RX ADMIN — Medication 2 TABLET(S): at 17:36

## 2020-02-03 RX ADMIN — Medication 0.5 MILLIGRAM(S): at 17:57

## 2020-02-03 RX ADMIN — MORPHINE 6 MILLIGRAM(S): 10 SOLUTION ORAL at 08:16

## 2020-02-03 RX ADMIN — Medication 16 MILLIGRAM(S): at 08:16

## 2020-02-03 RX ADMIN — OCTREOTIDE ACETATE 100 MICROGRAM(S): 200 INJECTION, SOLUTION INTRAVENOUS; SUBCUTANEOUS at 06:23

## 2020-02-03 RX ADMIN — Medication 1 EACH: at 17:36

## 2020-02-03 RX ADMIN — Medication 1 PACKET(S): at 06:23

## 2020-02-03 RX ADMIN — Medication 1 PACKET(S): at 13:12

## 2020-02-03 RX ADMIN — I.V. FAT EMULSION 20.8 ML/HR: 20 EMULSION INTRAVENOUS at 17:36

## 2020-02-03 RX ADMIN — Medication 2 TABLET(S): at 22:03

## 2020-02-03 RX ADMIN — Medication 1 APPLICATION(S): at 06:24

## 2020-02-03 RX ADMIN — MORPHINE 6 MILLIGRAM(S): 10 SOLUTION ORAL at 17:37

## 2020-02-03 RX ADMIN — Medication 50 GRAM(S): at 06:25

## 2020-02-03 RX ADMIN — Medication 0.5 MILLIGRAM(S): at 05:03

## 2020-02-03 RX ADMIN — CHLORHEXIDINE GLUCONATE 1 APPLICATION(S): 213 SOLUTION TOPICAL at 06:23

## 2020-02-03 RX ADMIN — Medication 16 MILLIGRAM(S): at 22:03

## 2020-02-03 RX ADMIN — Medication 1 PACKET(S): at 21:31

## 2020-02-03 RX ADMIN — MORPHINE 6 MILLIGRAM(S): 10 SOLUTION ORAL at 22:03

## 2020-02-03 NOTE — PROGRESS NOTE ADULT - SUBJECTIVE AND OBJECTIVE BOX
GENERAL SURGERY PROGRESS NOTE    SUBJECTIVE/24 HOUR EVENTS:   - Received one liter bolus for negative fluid balance  - Patient seen and examined at bedside  - Patient feels well, tolerating diet, on TPN, oob/ambi, pain controlled, denies f/c/n/v/d/sob    OBJECTIVE  PHYSICAL EXAM:  General: Appears well, NAD  Neuro: AAOx3  CHEST: Clear to auscultation bilaterally,  CV: Regular rate and rhythm  Abdomen: soft, nontender, nondistended, no rebound or guarding, ostomy pink, intact, draining semisolid  Extremities: Grossly symmetric    V/S:  Vital Signs Last 24 Hrs  T(C): 37.3 (03 Feb 2020 03:00), Max: 37.6 (02 Feb 2020 11:00)  T(F): 99.1 (03 Feb 2020 03:00), Max: 99.7 (02 Feb 2020 11:00)  HR: 84 (03 Feb 2020 07:00) (75 - 96)  BP: 84/52 (03 Feb 2020 07:00) (84/52 - 147/76)  BP(mean): 63 (03 Feb 2020 07:00) (63 - 100)  RR: 27 (03 Feb 2020 07:00) (18 - 33)  SpO2: 95% (03 Feb 2020 07:00) (95% - 100%)    --------------------------------------------------------------------------------------------------  I/Os:    02 Feb 2020 07:01  -  03 Feb 2020 07:00  --------------------------------------------------------  IN:    fat emulsion (Fish Oil and Plant Based) 20% Infusion: 270.4 mL    Oral Fluid: 840 mL    Solution: 1100 mL    TPN (Total Parenteral Nutrition): 1992 mL  Total IN: 4202.4 mL    OUT:    Ileostomy: 2125 mL    Voided: 2895 mL  Total OUT: 5020 mL    Total NET: -817.6 mL        --------------------------------------------------------------------------------------------------  LABS:                        7.7    8.70  )-----------( 312      ( 03 Feb 2020 00:24 )             24.7     03 Feb 2020 00:24    136    |  99     |  27     ----------------------------<  123    4.5     |  26     |  0.42     Ca    8.4        03 Feb 2020 00:24  Phos  3.8       03 Feb 2020 00:24  Mg     1.9       03 Feb 2020 00:24    TPro  6.5    /  Alb  2.6    /  TBili  0.6    /  DBili  0.4    /  AST  20     /  ALT  29     /  AlkPhos  119    03 Feb 2020 00:24      CAPILLARY BLOOD GLUCOSE            LIVER FUNCTIONS - ( 03 Feb 2020 00:24 )  Alb: 2.6 g/dL / Pro: 6.5 g/dL / ALK PHOS: 119 U/L / ALT: 29 U/L / AST: 20 U/L / GGT: x               --------------------------------------------------------------------------------------------------  MEDICATIONS  (STANDING):  albuterol/ipratropium for Nebulization. 3 milliLiter(s) Nebulizer every 6 hours  buDESOnide    Inhalation Suspension 0.5 milliGRAM(s) Inhalation every 12 hours  chlorhexidine 2% Cloths 1 Application(s) Topical <User Schedule>  diphenoxylate/atropine 2 Tablet(s) Oral <User Schedule>  enoxaparin Injectable 40 milliGRAM(s) SubCutaneous daily  ergocalciferol 73562 Unit(s) Oral every week  fat emulsion (Fish Oil and Plant Based) 20% Infusion 20.8 mL/Hr (20.8 mL/Hr) IV Continuous <Continuous>  loperamide 16 milliGRAM(s) Oral <User Schedule>  nystatin/triamcinolone Cream 1 Application(s) Topical two times a day  octreotide  Injectable 100 MICROGram(s) SubCutaneous three times a day  opium Tincture 6 milliGRAM(s) Oral <User Schedule>  pantoprazole    Tablet 40 milliGRAM(s) Oral before breakfast  Parenteral Nutrition - Adult 1 Each (83 mL/Hr) TPN Continuous <Continuous>  psyllium Powder 1 Packet(s) Oral three times a day  tamsulosin 0.4 milliGRAM(s) Oral daily    MEDICATIONS  (PRN):  acetaminophen   Tablet .. 975 milliGRAM(s) Oral every 6 hours PRN Mild Pain (1 - 3)  diphenhydramine 2%/zinc acetate 0.1% Cream 1 Application(s) Topical two times a day PRN Itching  ondansetron Injectable 4 milliGRAM(s) IV Push every 6 hours PRN Nausea and/or Vomiting

## 2020-02-03 NOTE — PROGRESS NOTE ADULT - ATTENDING COMMENTS
pt seen and examined.  agree with note above.  plan for xlap reversal of end ileostomy/MF tomorrow Tuesday 2/4  d/w pt R/B/A in detail and pt agrees to proceed with surgery.  appreciate excellent SICU care and nutrition and cards f/u.

## 2020-02-03 NOTE — PROGRESS NOTE ADULT - ASSESSMENT
73 y/o male presenting with high grade SBO s/p exploratory laparotomy, lysis of adhesions, decompression of bowel via enterotomy w/ primary repair, & Abthera VAC placement (12/06/2019); RTOR for re-exploration w/ Abthera VAC replacement (12/08/2019) to allow for demarcation of ischemic small bowel; RTOR for re-exploration, small bowel resection of 150 cm (150 cm remaining), ileocecetomy, end ileostomy, mucous fistula, and abdominal closure (12/10/2019); hospital course complicated by SVT, short bowel syndrome requiring repletions w/ IV fluids, malnutrition requiring TPN, intermittent episodes of hypotension, spontaneous left pneumothorax s/p pigtail catheter (12/15/2019-12/24/2019), left pleural effusion s/p pigtail catheter w/ IR (12/30/2019), dysphagia, and oral HSV lesions, placement of Pleurx on 1/10, now re-admitted to SICU with acute hypoxic respiratory failure s/p repeat VATS with 2 liter hemothorax removed. Respiratory failure resolved, now stable in SICU.    Neuro: no acute issues  - Pain control as needed with acetaminophen Pro Re Samantha.     Resp: COPD, spontaneous left pneumothorax s/p pigtail catheter, left pleural effusion s/p Pleurx 1/10.  Intubated for respiratory distress s/p VATS 01/15 with 2 Liter of blood evacuation s/p removal of chest tubes   - Pulmicort and Duoneb for COPD  - Will need outpatient follow-up with a pulmonologist for his COPD as patient does not currently have one  -F/u repeat AM CXR    CV: no acute issues  - Monitor hemodynamics, I&Os    GI: s/p exploratory laparotomy, Grceia, decompression of bowel via enterotomy w/ primary repair, & Abthera VAC placement (12/06/2019); re-exploration w/ ABthera VAC replacement (12/08/2019); re-exploration, small bowel resection of 150 cm (150 cm remaining), ileocecectomy end ileostomy, mucous fistula, and abdominal closure (12/10/2019); short bowel syndrome, malnutrition, dysphagia  - Mechanical soft diet + TPN  - Monitor ostomy output 1.5L past 24hrs  - Continue Lomotil, tincture of opium, loperamide, metamucil, and octreotide for short bowel syndrome  - Protonix to decrease gastric secretions  - Continue Vitamin D supplementation    Renal: polyuria 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     Heme: anemia likely secondary to malnutrition / malabsorption  - Monitor CBC and coags   - Lovenox for VTE prophylaxis    ID: oral HSV lesions (s/p acyclovir 12/23/2019-01/02/2020); completed course of meropenem for Zosyn-resistant Pseudomonas PNA   - Monitor for fever, culture if febrile    Endo: possible adrenal insufficiency  - Monitor glucose on BMPs   - Cosyntropin test indicative of adrenal insufficiency    Dispo: Surgical Intensive Care Unit.

## 2020-02-03 NOTE — PROGRESS NOTE ADULT - SUBJECTIVE AND OBJECTIVE BOX
HISTORY  75y Male past medical history of COPD and EtOH dependence who presented on 12/6/2019 with abdominal pain, nausea, hematemesis, and poor PO intake for ~2-3 days. In the ED, he was found to be hypotensive & tachycardic. Labs revealed an CHI and lactic acidosis. Imaging revealed a high grade SBO in the RLQ on CT scan. Hospital course is as follows:  12/06 - s/p exploratory laparotomy, lysis of adhesions, decompression of bowel via enterotomy w/ primary repair, and Abthera VAC placement as the distal 50% of the bowel appeared dusky but was still viable, admitted to SICU post-operatively as he was on vasopressor support and was left intubated  12/08 - s/p re-exploration, proximal 165 cm of small bowel appeared pink & viable but beyond that had patchy areas of ischemia so decision was made to give the bowel more time to demarcate before resecting in order to preserve as much small bowel as possible so Abthera VAC was replaced  12/09 - s/p re-exploration, small bowel resection of 150 cm (150 cm remaining), ileocecetomy, end ileostomy, mucous fistula, and abdominal closure  12/11 - extubated to BiPAP, noted to be in SVT that was rate controlled w/ metoprolol  12/14 - started on TPN  12/15 - noted to have spontaneous left pneumothorax s/p left pigtail catheter placement, noted to be in SVT again so amiodarone started, high ileostomy output noted so concern for short bowel syndrome  12/16 - amiodarone discontinued, started on beta blocker with metoprolol  12/18 - started Lomotil and ileostomy repletions with IV fluids  12/19 - started Imodium, left pigtail catheter was placed to water seal but pneumothorax noted on follow-up CXR so placed back to suction  12/20 - started tincture of opium, concern for aspiration so changed diet from regular to dysphagia 1 pureed with nectar-thickened liquids  12/22 - left pigtail catheter placed to water seal with no pneumothorax noted on follow-up CXR, CT chest with moderate left pleural effusion not being drained by pigtail catheter  12/23 - 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  12/24 - left pigtail catheter discontinued  12/28 - started Ancef for erythematous midline wound  12/29 - beta blocker discontinued for intermittent episodes of hypotension  12/30 - left pigtail catheter placement by IR with drainage of serous transudative fluid  01/02 - FEES demonstrating penetration with thin liquids so patient kept on dysphagia 1 pureed with nectar-thickened liquids  01/07 - Transferred to floors  01/10 - s/p left VATS w/ PleurX catheter placement  01/11 - evaluated by speech & swallow, advanced diet to mechanical soft with thin liquids  01/12 - PleurX catheter placed to water seal with on pneumothorax on follow-up CXR  01/13 - RRT for hypoxemia, placed on BiPAP, PleurX catheter placed back to suction, started vancomycin & Zosyn for possible HCAP  01/14 - noted to have minimal output from PleurX catheter, lung ultrasound with large left pleural effusion concerning for hemothorax, multiple episodes of bradycardia secondary to hypoxemia, remains on BiPAP  01/15 - worsening respiratory distress requiring intubation, hypotensive requiring vasopressor support, CT chest with large left hemothorax w/ trace pneumothorax & extensive subcutaneous emphysema so taken to OR emergently for left VATS, evacuation fo 2 L of clot, and placement of two left chest tubes  01/16 - extubated, weaned off vasopressor support  01/17 - two left chest tubes placed to water seal with no pneumothorax noted on follow-up CXR  02/01- Remains ileostomy reversal planning, chest tubes discontinued.         24 HOUR EVENTS:   - Received one liter bolus for negative fluid balance    SUBJECTIVE/ROS:  [ ] A ten-point review of systems was otherwise negative except as noted.  [ ] Due to altered mental status/intubation, subjective information were not able to be obtained from the patient. History was obtained, to the extent possible, from review of the chart and collateral sources of information.      NEURO  Exam: awake, alert, oriented  Meds: acetaminophen   Tablet .. 975 milliGRAM(s) Oral every 6 hours PRN Mild Pain (1 - 3)  ondansetron Injectable 4 milliGRAM(s) IV Push every 6 hours PRN Nausea and/or Vomiting  opium Tincture 6 milliGRAM(s) Oral <User Schedule>    [x] Adequacy of sedation and pain control has been assessed and adjusted      RESPIRATORY  RR: 27 (02-03-20 @ 00:00) (18 - 28)  SpO2: 100% (02-03-20 @ 00:00) (96% - 100%)  Exam: unlabored, clear to auscultation bilaterally  Mechanical Ventilation: none  [N/A] Extubation Readiness Assessed  Meds: albuterol/ipratropium for Nebulization. 3 milliLiter(s) Nebulizer every 6 hours  buDESOnide    Inhalation Suspension 0.5 milliGRAM(s) Inhalation every 12 hours        CARDIOVASCULAR  HR: 83 (02-03-20 @ 00:00) (75 - 100)  BP: 108/56 (02-03-20 @ 00:00) (86/52 - 147/76)  BP(mean): 78 (02-03-20 @ 00:00) (64 - 100)  Exam: regular rate and rhythm  Cardiac Rhythm: sinus  Perfusion     [x]Adequate   [ ]Inadequate  Mentation   [x]Normal       [ ]Reduced  Extremities  [x]Warm         [ ]Cool  Volume Status [ ]Hypervolemic [x]Euvolemic [ ]Hypovolemic  Meds: tamsulosin 0.4 milliGRAM(s) Oral daily        GI/NUTRITION  Exam: soft, nontender, nondistended  Diet: mechanical soft   Meds: diphenoxylate/atropine 2 Tablet(s) Oral <User Schedule>  loperamide 16 milliGRAM(s) Oral <User Schedule>  pantoprazole    Tablet 40 milliGRAM(s) Oral before breakfast  psyllium Powder 1 Packet(s) Oral three times a day      GENITOURINARY  I&O's Detail    02-01 @ 07:01  -  02-02 @ 07:00  --------------------------------------------------------  IN:    fat emulsion (Fish Oil and Plant Based) 20% Infusion: 270.4 mL    TPN (Total Parenteral Nutrition): 1992 mL  Total IN: 2262.4 mL    OUT:    Ileostomy: 1800 mL    Voided: 2025 mL  Total OUT: 3825 mL    Total NET: -1562.6 mL      02-02 @ 07:01  -  02-03 @ 01:13  --------------------------------------------------------  IN:    fat emulsion (Fish Oil and Plant Based) 20% Infusion: 145.6 mL    Oral Fluid: 500 mL    Solution: 1050 mL    TPN (Total Parenteral Nutrition): 1411 mL  Total IN: 3106.6 mL    OUT:    Ileostomy: 1665 mL    Voided: 2395 mL  Total OUT: 4060 mL    Total NET: -953.4 mL          02-03    136  |  99  |  27<H>  ----------------------------<  123<H>  4.5   |  26  |  0.42<L>    Ca    8.4      03 Feb 2020 00:24  Phos  3.8     02-03  Mg     1.9     02-03    TPro  6.5  /  Alb  2.6<L>  /  TBili  0.6  /  DBili  0.4<H>  /  AST  20  /  ALT  29  /  AlkPhos  119  02-03    [ ] Martinez catheter, indication: N/A  Meds: ergocalciferol 31262 Unit(s) Oral every week  fat emulsion (Fish Oil and Plant Based) 20% Infusion 20.8 mL/Hr IV Continuous <Continuous>  Parenteral Nutrition - Adult 1 Each TPN Continuous <Continuous>        HEMATOLOGIC  Meds: enoxaparin Injectable 40 milliGRAM(s) SubCutaneous daily    [x] VTE Prophylaxis                        7.7    8.70  )-----------( 312      ( 03 Feb 2020 00:24 )             24.7       Transfusion     [ ] PRBC   [ ] Platelets   [ ] FFP   [ ] Cryoprecipitate      INFECTIOUS DISEASES  WBC Count: 8.70 K/uL (02-03 @ 00:24)  WBC Count: 8.40 K/uL (02-02 @ 09:47)  WBC Count: 6.70 K/uL (02-02 @ 01:41)    RECENT CULTURES: none    Meds: none      ENDOCRINE  CAPILLARY BLOOD GLUCOSE        Meds: octreotide  Injectable 100 MICROGram(s) SubCutaneous three times a day        ACCESS DEVICES:  [x] Peripheral IV  [ ] Central Venous Line	[ ] R	[ ] L	[ ] IJ	[ ] Fem	[ ] SC	Placed:   [ ] Arterial Line		[ ] R	[ ] L	[ ] Fem	[ ] Rad	[ ] Ax	Placed:   [ ] PICC:					[ ] Mediport  [ ] Urinary Catheter, Date Placed:   [x] Necessity of urinary, arterial, and venous catheters discussed    OTHER MEDICATIONS:  chlorhexidine 2% Cloths 1 Application(s) Topical <User Schedule>  diphenhydramine 2%/zinc acetate 0.1% Cream 1 Application(s) Topical two times a day PRN  nystatin/triamcinolone Cream 1 Application(s) Topical two times a day      CODE STATUS: full code      IMAGING: < from: CT Abdomen and Pelvis w/ IV Cont (01.15.20 @ 13:25) >  FINDINGS:    CHEST:     LUNGS AND LARGE AIRWAYS: There is complete atelectasis of the left lower lobe and partial atelectasis of the left upper lobe. Partial compressive atelectasis of the right lower lobe. Emphysema. Nodular opacities in the right lower lobe, unchanged.  PLEURA: There is a left chest tubes. There is a large loculated left pleural effusion with areas of high attenuation, increased in size. Trace left pneumothorax. There is a small to moderate right pleural effusion.  VESSELS: Atherosclerotic changes of the aorta and coronary arteries. Right subclavian approach PICC tip in the SVC.  HEART: Heart size is normal. No pericardial effusion.  MEDIASTINUM AND MIKI: No lymphadenopathy.  CHEST WALL AND LOWER NECK: Extensive subcutaneous emphysema along the left chest wall. In addition, high attenuation is noted in the left chest wall adjacent to the pleural catheter measuring approximately 3.4 x 2.1 cm (3:146), suspicious for hematoma.    ABDOMEN AND PELVIS:    LIVER: Subcentimeter hypodense focus in the right hepatic lobe, too small to characterize, but unchanged.  BILE DUCTS: Normal caliber.  GALLBLADDER: Within normal limits.  SPLEEN: Within normal limits.  PANCREAS: Within normal limits.  ADRENALS: Within normal limits.  KIDNEYS/URETERS:Mild hydronephrosis bilaterally. Bilateral nonobstructing calculi.    BLADDER: Markedly distended urinary bladder.  REPRODUCTIVE ORGANS: Prostatectomy.    BOWEL: Right lower quadrant ileostomy. No bowel obstruction. Colonic diverticulosis.  PERITONEUM: Trace ascites. Multiple nonspecific peritoneal calcifications, unchanged. A small droplet of gas in the right anterior abdomen adjacent to the ileostomy (3:208 and 5:43) may be extraluminal.  VESSELS: Atherosclerotic changes.  RETROPERITONEUM/LYMPHNODES: No lymphadenopathy.    ABDOMINAL WALL: Diffuse anasarca. Postsurgical changes of the anterior abdominal wall. The previous reported fluid midline abdominal wall collection has resolved.   BONES: Degenerative changes in the spine.    IMPRESSION:     Loculated large left pleural effusion with areas of hyperattenuation likely secondary to hematoma. Interval increase in size of the loculated left pleural effusion since 12/30/2019. Left sided chest tube terminating in the pleural space. Trace left pneumothorax.     Extensive left subcutaneous emphysema. Hematoma is also noted along the left lateral chest wall.    Interval resolution of the midline anterior abdominal wall fluid collection.    Markedly distended urinary bladder with mild bilateral hydronephrosis.    A small droplet of gas in the right anterior abdomen adjacent to the ileostomy may be extraluminal. Repeat CT with oral contrast may be helpful for further evaluation.    < end of copied text >

## 2020-02-03 NOTE — PROGRESS NOTE ADULT - SUBJECTIVE AND OBJECTIVE BOX
[facility] NUTRITION SUPPORT / TPN -- FOLLOW UP NOTE  --------------------------------------------------------------------------------    24 hour events/subjective:  Pt for OR tomorrow & in good mood  Tolerating diet w/o pain-      no n/v  Less Lt ankle pain/ swelling  No leukocytosis  Continued high ostomy output w/ 2895mL over 24 hours       continue lomotil, imodium, octreotide, tincture of opium at max doses  increased Urine Output        Urine Output 2125mL  No cough/ cp/ palp/dyspnea/ sob  No f/c/s      Diet:  Diet, Mechanical Soft:   No Carb Prosource (1pkg = 15gms Protein)     Qty per Day:  1  Supplement Feeding Modality:  Oral  Ensure Enlive Cans or Servings Per Day:  2       Frequency:  Daily (01-24-20 @ 11:32)  Diet, NPO after Midnight:      NPO Start Date: 03-Feb-2020,   NPO Start Time: 23:59 (02-03-20 @ 06:41)      Appetite: [  ]Poor [  ]Adequate [ x ]Good  Caloric intake:  [x   ]  Adequate   [   ] Inadequate    ROS: General/ GI see HPI  all other systems negative      ALLERGIES & MEDICATIONS  --------------------------------------------------------------------------------  ALLERGIES  IV Contrast (Hives)      STANDING INPATIENT MEDICATIONS    albuterol/ipratropium for Nebulization. 3 milliLiter(s) Nebulizer every 6 hours  buDESOnide    Inhalation Suspension 0.5 milliGRAM(s) Inhalation every 12 hours  chlorhexidine 2% Cloths 1 Application(s) Topical <User Schedule>  diphenoxylate/atropine 2 Tablet(s) Oral <User Schedule>  enoxaparin Injectable 40 milliGRAM(s) SubCutaneous daily  ergocalciferol 53826 Unit(s) Oral every week  fat emulsion (Fish Oil and Plant Based) 20% Infusion 20.8 mL/Hr IV Continuous <Continuous>  loperamide 16 milliGRAM(s) Oral <User Schedule>  nystatin/triamcinolone Cream 1 Application(s) Topical two times a day  octreotide  Injectable 100 MICROGram(s) SubCutaneous three times a day  opium Tincture 6 milliGRAM(s) Oral <User Schedule>  pantoprazole    Tablet 40 milliGRAM(s) Oral before breakfast  Parenteral Nutrition - Adult 1 Each TPN Continuous <Continuous>  psyllium Powder 1 Packet(s) Oral three times a day  tamsulosin 0.4 milliGRAM(s) Oral daily      PRN INPATIENT MEDICATION  acetaminophen   Tablet .. 975 milliGRAM(s) Oral every 6 hours PRN  diphenhydramine 2%/zinc acetate 0.1% Cream 1 Application(s) Topical two times a day PRN  ondansetron Injectable 4 milliGRAM(s) IV Push every 6 hours PRN        VITALS/PHYSICAL EXAM  --------------------------------------------------------------------------------  T(C): 37.1 (02-03-20 @ 08:00), Max: 37.6 (02-02-20 @ 11:00)  HR: 86 (02-03-20 @ 09:00) (75 - 96)  BP: 119/56 (02-03-20 @ 09:00) (84/52 - 147/76)  RR: 24 (02-03-20 @ 09:00) (18 - 33)  SpO2: 99% (02-03-20 @ 09:00) (95% - 100%)  Wt(kg): --        02-02-20 @ 07:01  -  02-03-20 @ 07:00  --------------------------------------------------------  IN: 4202.4 mL / OUT: 5020 mL / NET: -817.6 mL    02-03-20 @ 07:01  -  02-03-20 @ 09:56  --------------------------------------------------------  IN: 486 mL / OUT: 50 mL / NET: 436 mL      PHYSICAL EXAM:  --------------------------------------------------------------------------------  	Gen: guarded but stable, A&Ox3, NC O2  	HEENT: NC/AT, PERRL, supple neck, trachea midline, mucosa moist              GI: (+) BS, softly distended, non tender                    midline dressing c/d/i w/o drainage                   (+)ostomy pink viable- thick loose BM green/ tan colored              MSK: FROM x4, no contractures nor deformities  	Vascular: Equally Warm,  no edema,  no clubbing, cyanosis,                      RUE PICC w/o sx infection   	Neuro: No focal deficits, intact sensation, weakened strength BLE>BUE  	Psych: Normal affect and mood              skin: moist with good turgor        LABS/ CULTURES/ RADIOLOGY:              7.7    8.70  >-----------<  312      [02-03-20 @ 00:24]              24.7     136  |  99  |  27  ----------------------------<  123      [02-03-20 @ 00:24]  4.5   |  26  |  0.42        Ca     8.4     [02-03-20 @ 00:24]      iCa    1.12     [02-03 @ 00:40]      Mg     1.9     [02-03-20 @ 00:24]      Phos  3.8     [02-03-20 @ 00:24]    TPro  6.5  /  Alb  2.6  /  TBili  0.6  /  DBili  0.4  /  AST  20  /  ALT  29  /  AlkPhos  119  [02-03-20 @ 00:24]      Prealbumin, Serum: 19 mg/dL (02-03-20 @ 07:18)  Prealbumin, Serum: 18 mg/dL (02-02-20 @ 09:48)  Prealbumin, Serum: 18 mg/dL (01-31-20 @ 07:40)  Prealbumin, Serum: 19 mg/dL (01-27-20 @ 04:52)  Prealbumin, Serum: 16 mg/dL (01-24-20 @ 02:53)      Triglycerides, Serum: 59 mg/dL (02.03.20 @ 00:24)  Triglycerides, Serum: 61 mg/dL (02.02.20 @ 01:41)  Triglycerides, Serum: 51 mg/dL (01.31.20 @ 04:10)  Triglycerides, Serum: 83 mg/dL (01.27.20 @ 00:14)  Triglycerides, Serum: 50 mg/dL (01.07.20 @ 01:05) [facility] NUTRITION SUPPORT / TPN -- FOLLOW UP NOTE  --------------------------------------------------------------------------------    24 hour events/subjective:  Pt for OR tomorrow & in good mood  Tolerating diet w/o pain-      no n/v  Less Lt ankle pain/ swelling w/ brace  No leukocytosis  Continued high ostomy output w/ 2895mL over 24 hours       continue lomotil, imodium, octreotide, tincture of opium at max doses  increased Urine Output        Urine Output 2125mL  No cough/ cp/ palp/dyspnea/ sob  No f/c/s      Diet:  Diet, Mechanical Soft:   No Carb Prosource (1pkg = 15gms Protein)     Qty per Day:  1  Supplement Feeding Modality:  Oral  Ensure Enlive Cans or Servings Per Day:  2       Frequency:  Daily (01-24-20 @ 11:32)  Diet, NPO after Midnight:      NPO Start Date: 03-Feb-2020,   NPO Start Time: 23:59 (02-03-20 @ 06:41)      Appetite: [  ]Poor [  ]Adequate [ x ]Good  Caloric intake:  [x   ]  Adequate   [   ] Inadequate    ROS: General/ GI see HPI  all other systems negative      ALLERGIES & MEDICATIONS  --------------------------------------------------------------------------------  ALLERGIES  IV Contrast (Hives)      STANDING INPATIENT MEDICATIONS    albuterol/ipratropium for Nebulization. 3 milliLiter(s) Nebulizer every 6 hours  buDESOnide    Inhalation Suspension 0.5 milliGRAM(s) Inhalation every 12 hours  chlorhexidine 2% Cloths 1 Application(s) Topical <User Schedule>  diphenoxylate/atropine 2 Tablet(s) Oral <User Schedule>  enoxaparin Injectable 40 milliGRAM(s) SubCutaneous daily  ergocalciferol 87351 Unit(s) Oral every week  fat emulsion (Fish Oil and Plant Based) 20% Infusion 20.8 mL/Hr IV Continuous <Continuous>  loperamide 16 milliGRAM(s) Oral <User Schedule>  nystatin/triamcinolone Cream 1 Application(s) Topical two times a day  octreotide  Injectable 100 MICROGram(s) SubCutaneous three times a day  opium Tincture 6 milliGRAM(s) Oral <User Schedule>  pantoprazole    Tablet 40 milliGRAM(s) Oral before breakfast  Parenteral Nutrition - Adult 1 Each TPN Continuous <Continuous>  psyllium Powder 1 Packet(s) Oral three times a day  tamsulosin 0.4 milliGRAM(s) Oral daily      PRN INPATIENT MEDICATION  acetaminophen   Tablet .. 975 milliGRAM(s) Oral every 6 hours PRN  diphenhydramine 2%/zinc acetate 0.1% Cream 1 Application(s) Topical two times a day PRN  ondansetron Injectable 4 milliGRAM(s) IV Push every 6 hours PRN        VITALS/PHYSICAL EXAM  --------------------------------------------------------------------------------  T(C): 37.1 (02-03-20 @ 08:00), Max: 37.6 (02-02-20 @ 11:00)  HR: 86 (02-03-20 @ 09:00) (75 - 96)  BP: 119/56 (02-03-20 @ 09:00) (84/52 - 147/76)  RR: 24 (02-03-20 @ 09:00) (18 - 33)  SpO2: 99% (02-03-20 @ 09:00) (95% - 100%)  Wt(kg): --        02-02-20 @ 07:01  -  02-03-20 @ 07:00  --------------------------------------------------------  IN: 4202.4 mL / OUT: 5020 mL / NET: -817.6 mL    02-03-20 @ 07:01  -  02-03-20 @ 09:56  --------------------------------------------------------  IN: 486 mL / OUT: 50 mL / NET: 436 mL      PHYSICAL EXAM:  --------------------------------------------------------------------------------  	Gen: guarded but stable, A&Ox3, NC O2  	HEENT: NC/AT, PERRL, supple neck, trachea midline, mucosa moist              GI: (+) BS, softly distended, non tender                    midline dressing c/d/i w/o drainage                   (+)ostomy pink viable- thick loose BM green/ tan colored              MSK: FROM x4, no contractures, LLE w/ brace  	Vascular: Equally Warm,  no edema,  no clubbing, cyanosis,                      RUE PICC w/o sx infection   	Neuro: No focal deficits, intact sensation, weakened strength BLE>BUE  	Psych: Normal affect and mood              skin: moist with good turgor        LABS/ CULTURES/ RADIOLOGY:              7.7    8.70  >-----------<  312      [02-03-20 @ 00:24]              24.7     136  |  99  |  27  ----------------------------<  123      [02-03-20 @ 00:24]  4.5   |  26  |  0.42        Ca     8.4     [02-03-20 @ 00:24]      iCa    1.12     [02-03 @ 00:40]      Mg     1.9     [02-03-20 @ 00:24]      Phos  3.8     [02-03-20 @ 00:24]    TPro  6.5  /  Alb  2.6  /  TBili  0.6  /  DBili  0.4  /  AST  20  /  ALT  29  /  AlkPhos  119  [02-03-20 @ 00:24]      Prealbumin, Serum: 19 mg/dL (02-03-20 @ 07:18)  Prealbumin, Serum: 18 mg/dL (02-02-20 @ 09:48)  Prealbumin, Serum: 18 mg/dL (01-31-20 @ 07:40)  Prealbumin, Serum: 19 mg/dL (01-27-20 @ 04:52)  Prealbumin, Serum: 16 mg/dL (01-24-20 @ 02:53)      Triglycerides, Serum: 59 mg/dL (02.03.20 @ 00:24)  Triglycerides, Serum: 61 mg/dL (02.02.20 @ 01:41)  Triglycerides, Serum: 51 mg/dL (01.31.20 @ 04:10)  Triglycerides, Serum: 83 mg/dL (01.27.20 @ 00:14)  Triglycerides, Serum: 50 mg/dL (01.07.20 @ 01:05)

## 2020-02-03 NOTE — PROGRESS NOTE ADULT - ATTENDING COMMENTS
Pt seen and examined. Chart reviewed. Resident note confirmed. Pt is a 75 year old male witha  medical history signficant for CAD/COPD/SIADH who presented to [facility] with abdominal pain. He underwent right colectomy for diverticulitis and second look laparotomy for small intestinal ischemia. Pt underwent subsequent small intestinal resection. He had prolonged resp failure and required vasopressor support. He developed a spont ptx/pleural effusion, requiring chest tube and subsequent VATS. He developed high ileostomy output and has been on maximal medical therapy. Pt is now 60 days post resection and return to OR for reversal of ileostomy is planned. No acute events overnight.      PMH/PSH/MEDS/ALL/SH/FH/ROS:  Unchanged from H&P  Vitals/PE/Labs/radiographs:  Reviewed      A/p    Neuro:	No active issues  	Continue pain control    CVS:	CAD  	S/p hypovolemia/hypotension  	Continue cardiac monitoring    Pulm:	COPD  	Atelectasis  	s/p ptx/effusion/VATS  	Continue ISP    GI:	s/p right colon rsn and ileal rsn  	Malnutrition  	Continue TPN  	For reversal of ostomy in AM    :	s/p CHI  	Monitor I's and O's  	  Heme:	Acute blood loss anema  	Monitor H/h    ID:	S/p diverticulitis  	S/p sepsis  	Cx for fever    Endo:	Continue glycemic control

## 2020-02-03 NOTE — PROGRESS NOTE ADULT - SUBJECTIVE AND OBJECTIVE BOX
Subjective: Patient seen and examined. No new events except as noted.   remains in ICU   - Received one liter bolus for negative fluid balance  REVIEW OF SYSTEMS:    CONSTITUTIONAL: + weakness, fevers or chills  EYES/ENT: No visual changes;  No vertigo or throat pain   NECK: No pain or stiffness  RESPIRATORY: No cough, wheezing, hemoptysis; No shortness of breath  CARDIOVASCULAR: No chest pain or palpitations  GASTROINTESTINAL: No abdominal or epigastric pain. No nausea, vomiting, or hematemesis; No diarrhea or constipation. No melena or hematochezia.  GENITOURINARY: No dysuria, frequency or hematuria  NEUROLOGICAL: No numbness or weakness  SKIN: No itching, burning, rashes, or lesions   All other review of systems is negative unless indicated above.    MEDICATIONS:  MEDICATIONS  (STANDING):  albuterol/ipratropium for Nebulization. 3 milliLiter(s) Nebulizer every 6 hours  buDESOnide    Inhalation Suspension 0.5 milliGRAM(s) Inhalation every 12 hours  chlorhexidine 2% Cloths 1 Application(s) Topical <User Schedule>  diphenoxylate/atropine 2 Tablet(s) Oral <User Schedule>  enoxaparin Injectable 40 milliGRAM(s) SubCutaneous daily  ergocalciferol 26736 Unit(s) Oral every week  fat emulsion (Fish Oil and Plant Based) 20% Infusion 20.8 mL/Hr (20.8 mL/Hr) IV Continuous <Continuous>  loperamide 16 milliGRAM(s) Oral <User Schedule>  nystatin/triamcinolone Cream 1 Application(s) Topical two times a day  octreotide  Injectable 100 MICROGram(s) SubCutaneous three times a day  opium Tincture 6 milliGRAM(s) Oral <User Schedule>  pantoprazole    Tablet 40 milliGRAM(s) Oral before breakfast  Parenteral Nutrition - Adult 1 Each (83 mL/Hr) TPN Continuous <Continuous>  psyllium Powder 1 Packet(s) Oral three times a day  tamsulosin 0.4 milliGRAM(s) Oral daily      PHYSICAL EXAM:  T(C): 36.9 (02-03-20 @ 19:00), Max: 37.3 (02-03-20 @ 03:00)  HR: 76 (02-03-20 @ 21:00) (74 - 96)  BP: 95/50 (02-03-20 @ 21:00) (84/52 - 125/59)  RR: 25 (02-03-20 @ 21:00) (18 - 35)  SpO2: 100% (02-03-20 @ 21:00) (93% - 100%)  Wt(kg): --  I&O's Summary    02 Feb 2020 07:01  -  03 Feb 2020 07:00  --------------------------------------------------------  IN: 4202.4 mL / OUT: 5020 mL / NET: -817.6 mL    03 Feb 2020 07:01  -  03 Feb 2020 21:15  --------------------------------------------------------  IN: 2215.2 mL / OUT: 2450 mL / NET: -234.8 mL            Appearance: NAD  HEENT:   Normal oral mucosa, PERRL, EOMI	  Lymphatic: No lymphadenopathy , no edema  Cardiovascular: Normal S1 S2, No JVD, No murmurs , Peripheral pulses palpable 2+ bilaterally  Respiratory: Lungs clear to auscultation, normal effort 	  Gastrointestinal:  Soft, Non-tender, +Ileostomy   Skin: No rashes, No ecchymoses, No cyanosis, warm to touch  Musculoskeletal: Normal range of motion, normal strength  Psychiatry:  Mood & affect appropriate  Ext: No edema        LABS:    CARDIAC MARKERS:                                7.7    8.70  )-----------( 312      ( 03 Feb 2020 00:24 )             24.7     02-03    136  |  99  |  27<H>  ----------------------------<  123<H>  4.5   |  26  |  0.42<L>    Ca    8.4      03 Feb 2020 00:24  Phos  3.8     02-03  Mg     1.9     02-03    TPro  6.5  /  Alb  2.6<L>  /  TBili  0.6  /  DBili  0.4<H>  /  AST  20  /  ALT  29  /  AlkPhos  119  02-03    proBNP:   Lipid Profile:   HgA1c:   TSH:             TELEMETRY: SR	    ECG:  	  RADIOLOGY:   DIAGNOSTIC TESTING:  [ ] Echocardiogram:  [ ]  Catheterization:  [ ] Stress Test:    OTHER:

## 2020-02-03 NOTE — PROGRESS NOTE ADULT - ASSESSMENT
74 y/o M presenting with septic shock and high grade SBO s/p exploratory laparotomy, lysis of adhesions, decompression of bowel via enterotomy w/ primary repair, and Abthera VAC placement on 12/6; s/p take down of Abthera, washout and re-application of the Abthera vac on 12/8. 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 s/p L chest tube placement by IR 12/30 for pleural effusion now s/p VATS, with chest tube insertion for drainage of pleural effusion. RRT called 1/13 AM for hypoxia, patient transferred back to SICU.  Patient continued SOB, chest drain possible obstruction expanding. Patent taken back to OR emergently 1/15 for clot evac and VATS.     PLAN:  - OR on Tuesday for ileostomy reversal  - Continue diet as tolerated  - Monitor Ileostomy output  - OOB to chair  - Continue Lomotil, loperamide, tincture of opium, octreotide  - Appreciate continued SICU care    [facility]  [id number] 74 y/o M presenting with septic shock and high grade SBO s/p exploratory laparotomy, lysis of adhesions, decompression of bowel via enterotomy w/ primary repair, and Abthera VAC placement on 12/6; s/p take down of Abthera, washout and re-application of the Abthera vac on 12/8. 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 s/p L chest tube placement by IR 12/30 for pleural effusion now s/p VATS, with chest tube insertion for drainage of pleural effusion. RRT called 1/13 AM for hypoxia, patient transferred back to SICU.  Patient continued SOB, chest drain possible obstruction expanding. Patent taken back to OR emergently 1/15 for clot evac and VATS.     PLAN:  - OR on Tuesday for ileostomy reversal  - Continue diet as tolerated, npo p MN  - Monitor Ileostomy output  - OOB to chair  - Continue Lomotil, loperamide, tincture of opium, octreotide  - Appreciate continued SICU care  - appreciate cards/nutrition input  - d/w pt at bedside in detail     [facility]  [id number]

## 2020-02-03 NOTE — CHART NOTE - NSCHARTNOTEFT_GEN_A_CORE
Nutrition Follow Up Note.    Patient seen for: malnutrition/TPN Team follow up.    Source: patient, medical record, TPN Team rounds    Chart reviewed, events noted. 24-hour events: Pt received 1L bolus for negative fluid balance in setting of high ileostomy output and urine output. Plan for ileostomy reversal .    Nutrition Status: Malnutrition. Pt with 150 cm small bowel remaining after GI surgery x 3. TPN initiated . Pt has been tolerating  po diet since . TPN discontinued as of 1/15; resumed at half goal . TPN increased to full dose on  to optimize nutrition prior to ileostomy reversal on .     Pt with ongoing high ileostomy output, but improvement noted. Fecal fat results indicate some degree of fat malabsorption. Rx for Lomotil, Imodium, opium tincture, metamucil powder, and octreotide noted. Pt noted with ongoing high urine output. 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 Both Calcium gluconate and MgSo4 now increased to 14mEq.    Diet: Mechanical Soft; 2 servings Ensure Enlive; 1 serving Prosource. NPO @ midnight 2/3.    Parenteral Nutrition: (): 2L infusing at 83ml/hr (120Gm amino acids, 210Gm dextrose, 50Gm SMOF lipids); to provide: 1694cal (31Kcal/Kg and 2.2Gm protein/Kg dosing wt 54.2Kg); with 10ml MVI and 3ml MTE-5.     Non-Protein Calories: 1214kcal/day (22cal/Kg)  Dextrose Infusion Rate: 2.7 mg/Kg/min  Lipid Infusion Rate: 0.92 Gm/Kg/day; 0.08 Gm/Kg/hr    PO Intake: Pt reports good po intake and tolerance. Pt reports menu fatigue; food preferences taken for high calorie-protein snack. Pt encouraged to take 1 Prosource daily.    Ileostomy output: 1800ml (), 2125ml (2/3)    Urine output x 24-hours: 2895ml    Daily Weight in k.5 (02-03), Weight in k.1 (), Weight in k.2 (), Weight in k.5 (), Weight in k.9 (), Weight in k.9 (), Weight in k.5 ()    Drug Dosing Weight  Weight (kg): 54 (15 Isma 2020 15:12)  BMI (kg/m2): 17.6 (15 Isma 2020 15:12)    Pertinent Medications: MEDICATIONS  (STANDING):  albuterol/ipratropium for Nebulization. 3 milliLiter(s) Nebulizer every 6 hours  buDESOnide    Inhalation Suspension 0.5 milliGRAM(s) Inhalation every 12 hours  chlorhexidine 2% Cloths 1 Application(s) Topical <User Schedule>  diphenoxylate/atropine 2 Tablet(s) Oral <User Schedule>  enoxaparin Injectable 40 milliGRAM(s) SubCutaneous daily  ergocalciferol 11745 Unit(s) Oral every week  fat emulsion (Fish Oil and Plant Based) 20% Infusion 20.8 mL/Hr (20.8 mL/Hr) IV Continuous <Continuous>  loperamide 16 milliGRAM(s) Oral <User Schedule>  nystatin/triamcinolone Cream 1 Application(s) Topical two times a day  octreotide  Injectable 100 MICROGram(s) SubCutaneous three times a day  opium Tincture 6 milliGRAM(s) Oral <User Schedule>  pantoprazole    Tablet 40 milliGRAM(s) Oral before breakfast  Parenteral Nutrition - Adult 1 Each (83 mL/Hr) TPN Continuous <Continuous>  psyllium Powder 1 Packet(s) Oral three times a day  tamsulosin 0.4 milliGRAM(s) Oral daily    MEDICATIONS  (PRN):  acetaminophen   Tablet .. 975 milliGRAM(s) Oral every 6 hours PRN Mild Pain (1 - 3)  diphenhydramine 2%/zinc acetate 0.1% Cream 1 Application(s) Topical two times a day PRN Itching  ondansetron Injectable 4 milliGRAM(s) IV Push every 6 hours PRN Nausea and/or Vomiting    LABS:    @ 07:18: Prealbumin 19<L>   @ 00:24: Sodium 136, Potassium 4.5, Chloride 99, Calcium 8.4, Magnesium 1.9, Phosphorus 3.8, BUN 27<H>, Creatinine 0.42<L>, <H>, Alk Phos 119, ALT/SGPT 29, AST/SGOT 20, Total Protein 6.5, Albumin 2.6<L>, Total Bilirubin 0.6, Direct Bilirubin 0.4<H>, Hemoglobin 7.7<L>, Hematocrit 24.7<L>   @ 09:48:  Prealbumin 18<L>,     Triglycerides, Serum: 59 mg/dL (20 @ 00:24)  Triglycerides, Serum: 61 mg/dL (20 @ 01:41)  Triglycerides, Serum: 51 mg/dL (20 @ 04:10)  Triglycerides, Serum: 83 mg/dL (20 @ 00:14)  Triglycerides, Serum: 78 mg/dL (20 @ 00:18)  Triglycerides, Serum: 76 mg/dL (20 @ 00:26)  Triglycerides, Serum: 51 mg/dL (01-15-20 @ 01:44)  Triglycerides, Serum: 60 mg/dL (20 @ 03:31)    Skin per nursing documentation: no pressure injuries documented  Edema: none noted    Estimated Needs: based on dosing wt 54.2Kg, with consideration for TPN, malnutrition  7191-0269 gregorio/day (25-30cal/Kg)   Gm protein/day (1.8-2.2Gm/Kg)     Previous Nutrition Diagnosis: Severe malnutrition  Nutrition Diagnosis is: ongoing, being addressed po diet, supplements, and TPN at full goal    New Nutrition Diagnosis: none     Interventions: Continue TPN at full goal; monitor po intake    Recommend  1) TPN per TPN Team/Nutrition assessment; continue at full goal in anticipation of ileostomy reversal 2/4  2) Continue Mechanical Soft diet. Food preferences taken for high calorie-protein snacks  3) Continue 2 servings Ensure Enlive (350cal, 20Gm protein per 8oz serving)   4) Continue Prosource (60cal, 15Gm protein), 1 serving daily  5) Monitor GI function and diet advancement post-op    Monitoring and Evaluation:     Continue to monitor nutrition provision and tolerance, weights, labs, skin integrity.    RD remains available upon request and will follow up per protocol.    Sowmya Graham, MS RD CDN [facility], Pager # [phone]. Nutrition Follow Up Note.    Patient seen for: malnutrition/TPN Team follow up.    Source: patient, medical record, TPN Team rounds    Chart reviewed, events noted. 24-hour events: Pt received 1L bolus for negative fluid balance in setting of high ileostomy output and urine output. Plan for ileostomy reversal .    Nutrition Status: Malnutrition. Pt with 150 cm small bowel remaining after GI surgery x 3. TPN initiated . Pt has been tolerating  po diet since . TPN discontinued as of 1/15; resumed at half goal . TPN increased to full dose on  to optimize nutrition prior to ileostomy reversal on .     Pt with ongoing high ileostomy output, but improvement noted. Fecal fat results indicate some degree of fat malabsorption. Rx for Lomotil, Imodium, opium tincture, metamucil powder, and octreotide noted. Pt noted with ongoing high urine output. 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 Plan to continue 60 mmol NaPhos, and 40 mEq KCL. Calcium gluconate previously increased to 14mEq; ionized Calcium now 1.12 <WNL>. MgSo4 increased yesterday to 14mEq; magnesium now 1.9 <WNL>.    Diet: Mechanical Soft; 2 servings Ensure Enlive; 1 serving Prosource. NPO @ midnight 2/3.    Parenteral Nutrition: (): 2L infusing at 83ml/hr (120Gm amino acids, 210Gm dextrose, 50Gm SMOF lipids); to provide: 1694cal (31Kcal/Kg and 2.2Gm protein/Kg dosing wt 54.2Kg); with 10ml MVI and 3ml MTE-5.     Non-Protein Calories: 1214kcal/day (22cal/Kg)  Dextrose Infusion Rate: 2.7 mg/Kg/min  Lipid Infusion Rate: 0.92 Gm/Kg/day; 0.08 Gm/Kg/hr    PO Intake: Pt reports good po intake and tolerance. Pt reports menu fatigue; food preferences taken for high calorie-protein snack. Pt encouraged to take 1 Prosource daily.    Ileostomy output: 1800ml (2/2), 2125ml (2/3)    Urine output x 24-hours: 2895ml    Daily Weight in k.5 (-), Weight in k.1 (-), Weight in k.2 (-), Weight in k.5 (), Weight in k.9 (), Weight in k.9 (), Weight in k.5 ()    Drug Dosing Weight  Weight (kg): 54 (15 Isma 2020 15:12)  BMI (kg/m2): 17.6 (15 Isma 2020 15:12)    Pertinent Medications: MEDICATIONS  (STANDING):  albuterol/ipratropium for Nebulization. 3 milliLiter(s) Nebulizer every 6 hours  buDESOnide    Inhalation Suspension 0.5 milliGRAM(s) Inhalation every 12 hours  chlorhexidine 2% Cloths 1 Application(s) Topical <User Schedule>  diphenoxylate/atropine 2 Tablet(s) Oral <User Schedule>  enoxaparin Injectable 40 milliGRAM(s) SubCutaneous daily  ergocalciferol 26171 Unit(s) Oral every week  fat emulsion (Fish Oil and Plant Based) 20% Infusion 20.8 mL/Hr (20.8 mL/Hr) IV Continuous <Continuous>  loperamide 16 milliGRAM(s) Oral <User Schedule>  nystatin/triamcinolone Cream 1 Application(s) Topical two times a day  octreotide  Injectable 100 MICROGram(s) SubCutaneous three times a day  opium Tincture 6 milliGRAM(s) Oral <User Schedule>  pantoprazole    Tablet 40 milliGRAM(s) Oral before breakfast  Parenteral Nutrition - Adult 1 Each (83 mL/Hr) TPN Continuous <Continuous>  psyllium Powder 1 Packet(s) Oral three times a day  tamsulosin 0.4 milliGRAM(s) Oral daily    MEDICATIONS  (PRN):  acetaminophen   Tablet .. 975 milliGRAM(s) Oral every 6 hours PRN Mild Pain (1 - 3)  diphenhydramine 2%/zinc acetate 0.1% Cream 1 Application(s) Topical two times a day PRN Itching  ondansetron Injectable 4 milliGRAM(s) IV Push every 6 hours PRN Nausea and/or Vomiting    LABS:    @ 07:18: Prealbumin 19<L>   @ 00:24: Sodium 136, Potassium 4.5, Chloride 99, Calcium 8.4, Magnesium 1.9, Phosphorus 3.8, BUN 27<H>, Creatinine 0.42<L>, <H>, Alk Phos 119, ALT/SGPT 29, AST/SGOT 20, Total Protein 6.5, Albumin 2.6<L>, Total Bilirubin 0.6, Direct Bilirubin 0.4<H>, Hemoglobin 7.7<L>, Hematocrit 24.7<L>   @ 09:48:  Prealbumin 18<L>,     Triglycerides, Serum: 59 mg/dL (20 @ 00:24)  Triglycerides, Serum: 61 mg/dL (20 @ 01:41)  Triglycerides, Serum: 51 mg/dL (20 @ 04:10)  Triglycerides, Serum: 83 mg/dL (20 @ 00:14)  Triglycerides, Serum: 78 mg/dL (20 @ 00:18)  Triglycerides, Serum: 76 mg/dL (20 @ 00:26)  Triglycerides, Serum: 51 mg/dL (01-15-20 @ 01:44)  Triglycerides, Serum: 60 mg/dL (20 @ 03:31)    Skin per nursing documentation: no pressure injuries documented  Edema: none noted    Estimated Needs: based on dosing wt 54.2Kg, with consideration for TPN, malnutrition  5436-4040 gregorio/day (25-30cal/Kg)   Gm protein/day (1.8-2.2Gm/Kg)     Previous Nutrition Diagnosis: Severe malnutrition  Nutrition Diagnosis is: ongoing, being addressed po diet, supplements, and TPN at full goal    New Nutrition Diagnosis: none     Interventions: Continue TPN at full goal; monitor po intake    Recommend  1) TPN per TPN Team/Nutrition assessment; continue at full goal in anticipation of ileostomy reversal 2/4  2) Continue Mechanical Soft diet. Food preferences taken for high calorie-protein snacks  3) Continue 2 servings Ensure Enlive (350cal, 20Gm protein per 8oz serving)   4) Continue Prosource (60cal, 15Gm protein), 1 serving daily  5) Monitor GI function and diet advancement post-op    Monitoring and Evaluation:     Continue to monitor nutrition provision and tolerance, weights, labs, skin integrity.    RD remains available upon request and will follow up per protocol.    Sowmya Graham MS RD CDN [facility], Pager # [phone].

## 2020-02-03 NOTE — PROGRESS NOTE ADULT - ASSESSMENT
A/P: 75 year old male w/ PMH of COPD and EtOH dependence with PSH/o appy, prostate surgery, & spine surgery  septic shock and high grade SBO s/p exploratory laparotomy, lysis of adhesions, decompression of bowel via enterotomy w/ primary repair, and Abthera VAC placement on 12/6; s/p take down of Abthera, washout and re-application of the Abthera vac on 12/8. Now s/p SBR and end ileostomy on 12/10.   TPN consulted to assist w/ management of pt's nutrition in pt w/ prolonged hospital course now tolerating diet but has high Ileostomy output.  Pt s/p Lt Chest Pigtail for effusion in IR with persistent high output on 1/10/2020 pt had VATs & placement of Left PleurX catheter. Pt op pt developed hemothorax and Respiratory Distress.  Pt is s/p Lt chest Evacuation of 2L blood w/ placement of CT x2 on 1/15/2020 for Lt Pleural Effusion that's resolving and doing well after CT removed and resolved Rt PNA vs Pleural Effusion.     Plan for OR tomorrow Ileostomy reversal  **Pt for OR.  Please DO NOT stop TPN.  If TPN needs to be stopped or runs out- Please start D10 at the same rate. A new bag of TPN will be ordered tomorrow as per protocol.**   Pt remains in SICU w/ High Ostomy & Urine Output,   Pt w/ improving severe Protein-Calorie Malnutrition-         Short Gut Syndrome w/Malabsorption- increasing back to GOAL TPN to Nutritionally optimize             patient going to the OR        Pt unable to match nutritional GOALs w/ oral nutrition  (stagnant Prealbumin and low TG)  TPN GOAL:    Amino Acids 120g, Dextrose 210g, and Lipids 50g in 2000mL with 3mL MTE & 10mL MVI        Pt tolerating Soft Mechanical w/ Ensure.  Pt inconsistently taking Prosource supplements             -Concern for adrenal insufficiency - am Cortisol &  ACTH Stim test reviewed        Consider close monitoring vs Stress dose Steroids  -HypoCa- improving w/ increased Ca in TPN to 14mEq- continue to monitor        Improved Ca levels helpful for BP & muscle contraction  -HypoMg- improving w/ increased Mg to 14mEq  -HypoPhos- improving w/-increased NaPhos & will continue to monitor   -Fecal fat testing suggestive of decreased absorption of fat -         TPN w/ MVI to compensate for low Vit E & A        Vit D levels low- being supplemented w/Ergocalciferol         Vit K INR/ PT near normal suggestive that it is being absorbed   -Edema greatly improved and Fluid overload resolved -          tolerating increased TPN volume & osm          Pt required IL bolus yesterday  -Increased Urine Output- will continue to monitor  -Strict Intake and Output -            Continue to monitor while on octreotide, lomotil, tincture of opium, & imodium  -Hyperglycemia-improving      Fingersticks & ISS coverage - as per SICU  -Weights three times a week  -Daily CMP, Mg, Ionized Ca, Phosphorus,        and Weekly Triglycerides and Pre-albumin  Continue as per SICU/Surgery, will follow with you, D/w primary team    Andreina Hubbard PA-C  TPN team, pager [phone]  D/w Ana M Chacko & MU Siegel

## 2020-02-04 ENCOUNTER — RESULT REVIEW (OUTPATIENT)
Age: 75
End: 2020-02-04

## 2020-02-04 LAB
ALBUMIN SERPL ELPH-MCNC: 2.7 G/DL — LOW (ref 3.3–5)
ALBUMIN SERPL ELPH-MCNC: 3 G/DL — LOW (ref 3.3–5)
ALP SERPL-CCNC: 114 U/L — SIGNIFICANT CHANGE UP (ref 40–120)
ALP SERPL-CCNC: 97 U/L — SIGNIFICANT CHANGE UP (ref 40–120)
ALT FLD-CCNC: 35 U/L — SIGNIFICANT CHANGE UP (ref 10–45)
ALT FLD-CCNC: 40 U/L — SIGNIFICANT CHANGE UP (ref 10–45)
ANION GAP SERPL CALC-SCNC: 8 MMOL/L — SIGNIFICANT CHANGE UP (ref 5–17)
ANION GAP SERPL CALC-SCNC: 9 MMOL/L — SIGNIFICANT CHANGE UP (ref 5–17)
AST SERPL-CCNC: 31 U/L — SIGNIFICANT CHANGE UP (ref 10–40)
AST SERPL-CCNC: 34 U/L — SIGNIFICANT CHANGE UP (ref 10–40)
BASE EXCESS BLDV CALC-SCNC: -8.5 MMOL/L — LOW (ref -2–2)
BASE EXCESS BLDV CALC-SCNC: 0.2 MMOL/L — SIGNIFICANT CHANGE UP (ref -2–2)
BASE EXCESS BLDV CALC-SCNC: 1 MMOL/L — SIGNIFICANT CHANGE UP (ref -2–2)
BASE EXCESS BLDV CALC-SCNC: 3.8 MMOL/L — HIGH (ref -2–2)
BASOPHILS # BLD AUTO: 0.02 K/UL — SIGNIFICANT CHANGE UP (ref 0–0.2)
BASOPHILS NFR BLD AUTO: 0.1 % — SIGNIFICANT CHANGE UP (ref 0–2)
BILIRUB DIRECT SERPL-MCNC: 0.5 MG/DL — HIGH (ref 0–0.2)
BILIRUB DIRECT SERPL-MCNC: 0.8 MG/DL — HIGH (ref 0–0.2)
BILIRUB INDIRECT FLD-MCNC: 0.5 MG/DL — SIGNIFICANT CHANGE UP (ref 0.2–1)
BILIRUB INDIRECT FLD-MCNC: 0.6 MG/DL — SIGNIFICANT CHANGE UP (ref 0.2–1)
BILIRUB SERPL-MCNC: 1 MG/DL — SIGNIFICANT CHANGE UP (ref 0.2–1.2)
BILIRUB SERPL-MCNC: 1.4 MG/DL — HIGH (ref 0.2–1.2)
BLOOD GAS VENOUS - CREATININE: SIGNIFICANT CHANGE UP MG/DL (ref 0.5–1.3)
BLOOD GAS VENOUS - CREATININE: SIGNIFICANT CHANGE UP MG/DL (ref 0.5–1.3)
BUN SERPL-MCNC: 28 MG/DL — HIGH (ref 7–23)
BUN SERPL-MCNC: 28 MG/DL — HIGH (ref 7–23)
CA-I BLD-SCNC: 1.17 MMOL/L — SIGNIFICANT CHANGE UP (ref 1.12–1.3)
CA-I SERPL-SCNC: 0.96 MMOL/L — LOW (ref 1.12–1.3)
CA-I SERPL-SCNC: 1.24 MMOL/L — SIGNIFICANT CHANGE UP (ref 1.12–1.3)
CA-I SERPL-SCNC: 1.28 MMOL/L — SIGNIFICANT CHANGE UP (ref 1.12–1.3)
CALCIUM SERPL-MCNC: 10.1 MG/DL — SIGNIFICANT CHANGE UP (ref 8.4–10.5)
CALCIUM SERPL-MCNC: 8.6 MG/DL — SIGNIFICANT CHANGE UP (ref 8.4–10.5)
CHLORIDE BLDV-SCNC: SIGNIFICANT CHANGE UP MMOL/L (ref 96–108)
CHLORIDE SERPL-SCNC: 96 MMOL/L — SIGNIFICANT CHANGE UP (ref 96–108)
CHLORIDE SERPL-SCNC: 98 MMOL/L — SIGNIFICANT CHANGE UP (ref 96–108)
CO2 BLDV-SCNC: 32 MMOL/L — HIGH (ref 22–30)
CO2 SERPL-SCNC: 28 MMOL/L — SIGNIFICANT CHANGE UP (ref 22–31)
CO2 SERPL-SCNC: 30 MMOL/L — SIGNIFICANT CHANGE UP (ref 22–31)
CORTICOSTEROID BINDING GLOBULIN RESULT: 1.8 MG/DL — SIGNIFICANT CHANGE UP
CORTIS F/TOTAL MFR SERPL: 66 % — SIGNIFICANT CHANGE UP
CORTIS SERPL-MCNC: 36 UG/DL — HIGH
CORTISOL, FREE RESULT: 24 UG/DL — HIGH
CREAT SERPL-MCNC: 0.45 MG/DL — LOW (ref 0.5–1.3)
CREAT SERPL-MCNC: 0.49 MG/DL — LOW (ref 0.5–1.3)
EOSINOPHIL # BLD AUTO: 0.03 K/UL — SIGNIFICANT CHANGE UP (ref 0–0.5)
EOSINOPHIL NFR BLD AUTO: 0.2 % — SIGNIFICANT CHANGE UP (ref 0–6)
GAS PNL BLDV: 121 MMOL/L — LOW (ref 135–145)
GAS PNL BLDV: 124 MMOL/L — LOW (ref 135–145)
GAS PNL BLDV: 133 MMOL/L — LOW (ref 135–145)
GAS PNL BLDV: SIGNIFICANT CHANGE UP
GLUCOSE BLDV-MCNC: 170 MG/DL — HIGH (ref 70–99)
GLUCOSE BLDV-MCNC: 193 MG/DL — HIGH (ref 70–99)
GLUCOSE BLDV-MCNC: >900 MG/DL — CRITICAL HIGH (ref 70–99)
GLUCOSE SERPL-MCNC: 117 MG/DL — HIGH (ref 70–99)
GLUCOSE SERPL-MCNC: 209 MG/DL — HIGH (ref 70–99)
HCO3 BLDV-SCNC: 22 MMOL/L — SIGNIFICANT CHANGE UP (ref 21–29)
HCO3 BLDV-SCNC: 25 MMOL/L — SIGNIFICANT CHANGE UP (ref 21–29)
HCO3 BLDV-SCNC: 26 MMOL/L — SIGNIFICANT CHANGE UP (ref 21–29)
HCO3 BLDV-SCNC: 30 MMOL/L — HIGH (ref 21–29)
HCT VFR BLD CALC: 24.2 % — LOW (ref 39–50)
HCT VFR BLD CALC: 30.3 % — LOW (ref 39–50)
HCT VFR BLDA CALC: 23 % — LOW (ref 39–50)
HGB BLD CALC-MCNC: 7.3 G/DL — LOW (ref 13–17)
HGB BLD CALC-MCNC: 7.4 G/DL — LOW (ref 13–17)
HGB BLD CALC-MCNC: 7.4 G/DL — LOW (ref 13–17)
HGB BLD-MCNC: 7.9 G/DL — LOW (ref 13–17)
HGB BLD-MCNC: 9.5 G/DL — LOW (ref 13–17)
HOROWITZ INDEX BLDV+IHG-RTO: 0 — SIGNIFICANT CHANGE UP
HOROWITZ INDEX BLDV+IHG-RTO: 50 — SIGNIFICANT CHANGE UP
IMM GRANULOCYTES NFR BLD AUTO: 0.6 % — SIGNIFICANT CHANGE UP (ref 0–1.5)
LACTATE BLDV-MCNC: 1 MMOL/L — SIGNIFICANT CHANGE UP (ref 0.7–2)
LACTATE BLDV-MCNC: 1.1 MMOL/L — SIGNIFICANT CHANGE UP (ref 0.7–2)
LACTATE BLDV-MCNC: 1.3 MMOL/L — SIGNIFICANT CHANGE UP (ref 0.7–2)
LYMPHOCYTES # BLD AUTO: 0.74 K/UL — LOW (ref 1–3.3)
LYMPHOCYTES # BLD AUTO: 4.7 % — LOW (ref 13–44)
MAGNESIUM SERPL-MCNC: 1.8 MG/DL — SIGNIFICANT CHANGE UP (ref 1.6–2.6)
MAGNESIUM SERPL-MCNC: 2.1 MG/DL — SIGNIFICANT CHANGE UP (ref 1.6–2.6)
MCHC RBC-ENTMCNC: 29.5 PG — SIGNIFICANT CHANGE UP (ref 27–34)
MCHC RBC-ENTMCNC: 30 PG — SIGNIFICANT CHANGE UP (ref 27–34)
MCHC RBC-ENTMCNC: 31.4 GM/DL — LOW (ref 32–36)
MCHC RBC-ENTMCNC: 32.6 GM/DL — SIGNIFICANT CHANGE UP (ref 32–36)
MCV RBC AUTO: 92 FL — SIGNIFICANT CHANGE UP (ref 80–100)
MCV RBC AUTO: 94.1 FL — SIGNIFICANT CHANGE UP (ref 80–100)
MONOCYTES # BLD AUTO: 0.71 K/UL — SIGNIFICANT CHANGE UP (ref 0–0.9)
MONOCYTES NFR BLD AUTO: 4.5 % — SIGNIFICANT CHANGE UP (ref 2–14)
NEUTROPHILS # BLD AUTO: 14.06 K/UL — HIGH (ref 1.8–7.4)
NEUTROPHILS NFR BLD AUTO: 89.9 % — HIGH (ref 43–77)
NRBC # BLD: 0 /100 WBCS — SIGNIFICANT CHANGE UP (ref 0–0)
NRBC # BLD: 0 /100 WBCS — SIGNIFICANT CHANGE UP (ref 0–0)
PCO2 BLDV: 41 MMHG — SIGNIFICANT CHANGE UP (ref 35–50)
PCO2 BLDV: 47 MMHG — SIGNIFICANT CHANGE UP (ref 35–50)
PCO2 BLDV: 60 MMHG — HIGH (ref 35–50)
PCO2 BLDV: 82 MMHG — HIGH (ref 35–50)
PH BLDV: 7.05 — CRITICAL LOW (ref 7.35–7.45)
PH BLDV: 7.32 — LOW (ref 7.35–7.45)
PH BLDV: 7.36 — SIGNIFICANT CHANGE UP (ref 7.35–7.45)
PH BLDV: 7.39 — SIGNIFICANT CHANGE UP (ref 7.35–7.45)
PHOSPHATE SERPL-MCNC: 4 MG/DL — SIGNIFICANT CHANGE UP (ref 2.5–4.5)
PHOSPHATE SERPL-MCNC: 4.7 MG/DL — HIGH (ref 2.5–4.5)
PLATELET # BLD AUTO: 307 K/UL — SIGNIFICANT CHANGE UP (ref 150–400)
PLATELET # BLD AUTO: 329 K/UL — SIGNIFICANT CHANGE UP (ref 150–400)
PO2 BLDV: 155 MMHG — HIGH (ref 25–45)
PO2 BLDV: 51 MMHG — HIGH (ref 25–45)
PO2 BLDV: 58 MMHG — HIGH (ref 25–45)
PO2 BLDV: 84 MMHG — HIGH (ref 25–45)
POTASSIUM BLDV-SCNC: 4.1 MMOL/L — SIGNIFICANT CHANGE UP (ref 3.5–5)
POTASSIUM BLDV-SCNC: 4.3 MMOL/L — SIGNIFICANT CHANGE UP (ref 3.5–5)
POTASSIUM BLDV-SCNC: 6.5 MMOL/L — CRITICAL HIGH (ref 3.5–5)
POTASSIUM SERPL-MCNC: 4.4 MMOL/L — SIGNIFICANT CHANGE UP (ref 3.5–5.3)
POTASSIUM SERPL-MCNC: 4.6 MMOL/L — SIGNIFICANT CHANGE UP (ref 3.5–5.3)
POTASSIUM SERPL-SCNC: 4.4 MMOL/L — SIGNIFICANT CHANGE UP (ref 3.5–5.3)
POTASSIUM SERPL-SCNC: 4.6 MMOL/L — SIGNIFICANT CHANGE UP (ref 3.5–5.3)
PREALB SERPL-MCNC: 21 MG/DL — SIGNIFICANT CHANGE UP (ref 20–40)
PROT SERPL-MCNC: 6.1 G/DL — SIGNIFICANT CHANGE UP (ref 6–8.3)
PROT SERPL-MCNC: 6.8 G/DL — SIGNIFICANT CHANGE UP (ref 6–8.3)
RBC # BLD: 2.63 M/UL — LOW (ref 4.2–5.8)
RBC # BLD: 3.22 M/UL — LOW (ref 4.2–5.8)
RBC # FLD: 13.9 % — SIGNIFICANT CHANGE UP (ref 10.3–14.5)
RBC # FLD: 14.1 % — SIGNIFICANT CHANGE UP (ref 10.3–14.5)
SAO2 % BLDV: 69 % — SIGNIFICANT CHANGE UP (ref 67–88)
SAO2 % BLDV: 86 % — SIGNIFICANT CHANGE UP (ref 67–88)
SAO2 % BLDV: 96 % — HIGH (ref 67–88)
SAO2 % BLDV: 99 % — HIGH (ref 67–88)
SODIUM SERPL-SCNC: 133 MMOL/L — LOW (ref 135–145)
SODIUM SERPL-SCNC: 136 MMOL/L — SIGNIFICANT CHANGE UP (ref 135–145)
WBC # BLD: 14.95 K/UL — HIGH (ref 3.8–10.5)
WBC # BLD: 18.35 K/UL — HIGH (ref 3.8–10.5)
WBC # FLD AUTO: 14.95 K/UL — HIGH (ref 3.8–10.5)
WBC # FLD AUTO: 18.35 K/UL — HIGH (ref 3.8–10.5)

## 2020-02-04 PROCEDURE — 88304 TISSUE EXAM BY PATHOLOGIST: CPT | Mod: 26

## 2020-02-04 PROCEDURE — 71045 X-RAY EXAM CHEST 1 VIEW: CPT | Mod: 26

## 2020-02-04 PROCEDURE — 99233 SBSQ HOSP IP/OBS HIGH 50: CPT

## 2020-02-04 PROCEDURE — 99291 CRITICAL CARE FIRST HOUR: CPT

## 2020-02-04 RX ORDER — ELECTROLYTE SOLUTION,INJ
1 VIAL (ML) INTRAVENOUS
Refills: 0 | Status: DISCONTINUED | OUTPATIENT
Start: 2020-02-04 | End: 2020-02-04

## 2020-02-04 RX ORDER — I.V. FAT EMULSION 20 G/100ML
20.8 EMULSION INTRAVENOUS
Qty: 50 | Refills: 0 | Status: DISCONTINUED | OUTPATIENT
Start: 2020-02-04 | End: 2020-02-05

## 2020-02-04 RX ORDER — SIMETHICONE 80 MG/1
80 TABLET, CHEWABLE ORAL ONCE
Refills: 0 | Status: COMPLETED | OUTPATIENT
Start: 2020-02-04 | End: 2020-02-04

## 2020-02-04 RX ORDER — ACETAMINOPHEN 500 MG
1000 TABLET ORAL EVERY 6 HOURS
Refills: 0 | Status: COMPLETED | OUTPATIENT
Start: 2020-02-04 | End: 2020-02-05

## 2020-02-04 RX ORDER — HYDROMORPHONE HYDROCHLORIDE 2 MG/ML
0.5 INJECTION INTRAMUSCULAR; INTRAVENOUS; SUBCUTANEOUS
Refills: 0 | Status: DISCONTINUED | OUTPATIENT
Start: 2020-02-04 | End: 2020-02-11

## 2020-02-04 RX ORDER — PANTOPRAZOLE SODIUM 20 MG/1
40 TABLET, DELAYED RELEASE ORAL DAILY
Refills: 0 | Status: DISCONTINUED | OUTPATIENT
Start: 2020-02-04 | End: 2020-02-07

## 2020-02-04 RX ORDER — BENZOCAINE AND MENTHOL 5; 1 G/100ML; G/100ML
1 LIQUID ORAL EVERY 4 HOURS
Refills: 0 | Status: DISCONTINUED | OUTPATIENT
Start: 2020-02-04 | End: 2020-02-18

## 2020-02-04 RX ORDER — MAGNESIUM SULFATE 500 MG/ML
2 VIAL (ML) INJECTION ONCE
Refills: 0 | Status: COMPLETED | OUTPATIENT
Start: 2020-02-04 | End: 2020-02-04

## 2020-02-04 RX ORDER — SODIUM CHLORIDE 9 MG/ML
1000 INJECTION, SOLUTION INTRAVENOUS
Refills: 0 | Status: DISCONTINUED | OUTPATIENT
Start: 2020-02-04 | End: 2020-02-05

## 2020-02-04 RX ADMIN — Medication 50 GRAM(S): at 23:07

## 2020-02-04 RX ADMIN — Medication 1 EACH: at 17:21

## 2020-02-04 RX ADMIN — Medication 0.5 MILLIGRAM(S): at 17:00

## 2020-02-04 RX ADMIN — Medication 400 MILLIGRAM(S): at 18:00

## 2020-02-04 RX ADMIN — OCTREOTIDE ACETATE 100 MICROGRAM(S): 200 INJECTION, SOLUTION INTRAVENOUS; SUBCUTANEOUS at 21:25

## 2020-02-04 RX ADMIN — Medication 1 APPLICATION(S): at 05:07

## 2020-02-04 RX ADMIN — HYDROMORPHONE HYDROCHLORIDE 0.5 MILLIGRAM(S): 2 INJECTION INTRAMUSCULAR; INTRAVENOUS; SUBCUTANEOUS at 19:05

## 2020-02-04 RX ADMIN — BENZOCAINE AND MENTHOL 1 LOZENGE: 5; 1 LIQUID ORAL at 21:52

## 2020-02-04 RX ADMIN — Medication 1000 MILLIGRAM(S): at 18:23

## 2020-02-04 RX ADMIN — I.V. FAT EMULSION 20.8 ML/HR: 20 EMULSION INTRAVENOUS at 17:21

## 2020-02-04 RX ADMIN — Medication 0.5 MILLIGRAM(S): at 05:37

## 2020-02-04 RX ADMIN — Medication 400 MILLIGRAM(S): at 23:07

## 2020-02-04 RX ADMIN — Medication 1 APPLICATION(S): at 17:22

## 2020-02-04 RX ADMIN — HYDROMORPHONE HYDROCHLORIDE 0.5 MILLIGRAM(S): 2 INJECTION INTRAMUSCULAR; INTRAVENOUS; SUBCUTANEOUS at 14:24

## 2020-02-04 RX ADMIN — Medication 1000 MILLIGRAM(S): at 23:30

## 2020-02-04 RX ADMIN — Medication 3 MILLILITER(S): at 17:00

## 2020-02-04 RX ADMIN — Medication 3 MILLILITER(S): at 05:37

## 2020-02-04 RX ADMIN — CHLORHEXIDINE GLUCONATE 1 APPLICATION(S): 213 SOLUTION TOPICAL at 05:07

## 2020-02-04 RX ADMIN — OCTREOTIDE ACETATE 100 MICROGRAM(S): 200 INJECTION, SOLUTION INTRAVENOUS; SUBCUTANEOUS at 05:06

## 2020-02-04 RX ADMIN — HYDROMORPHONE HYDROCHLORIDE 0.5 MILLIGRAM(S): 2 INJECTION INTRAMUSCULAR; INTRAVENOUS; SUBCUTANEOUS at 19:20

## 2020-02-04 RX ADMIN — SODIUM CHLORIDE 50 MILLILITER(S): 9 INJECTION, SOLUTION INTRAVENOUS at 14:27

## 2020-02-04 RX ADMIN — SIMETHICONE 80 MILLIGRAM(S): 80 TABLET, CHEWABLE ORAL at 21:42

## 2020-02-04 RX ADMIN — HYDROMORPHONE HYDROCHLORIDE 0.5 MILLIGRAM(S): 2 INJECTION INTRAMUSCULAR; INTRAVENOUS; SUBCUTANEOUS at 14:45

## 2020-02-04 RX ADMIN — Medication 3 MILLILITER(S): at 00:12

## 2020-02-04 RX ADMIN — PANTOPRAZOLE SODIUM 40 MILLIGRAM(S): 20 TABLET, DELAYED RELEASE ORAL at 17:21

## 2020-02-04 NOTE — PROGRESS NOTE ADULT - PROBLEM SELECTOR PLAN 1
s/p ex lap, MIRYAM, closure of enterotomy, abthera vac placement  s/p washout  Plan for RTOR Today. Acceptable cardiac risk to proceed   s/p PICC line placement . On TPN

## 2020-02-04 NOTE — PRE-ANESTHESIA EVALUATION ADULT - NSANTHOSAYNRD_GEN_A_CORE
No. NEHA screening performed.  STOP BANG Legend: 0-2 = LOW Risk; 3-4 = INTERMEDIATE Risk; 5-8 = HIGH Risk

## 2020-02-04 NOTE — PRE-ANESTHESIA EVALUATION ADULT - MALLAMPATI CLASS
Class II - visualization of the soft palate, fauces, and uvula
Class II - visualization of the soft palate, fauces, and uvula
Class I (easy) - visualization of the soft palate, fauces, uvula, and both anterior and posterior pillars

## 2020-02-04 NOTE — PROGRESS NOTE ADULT - ASSESSMENT
74 y/o M presenting with septic shock and high grade SBO s/p exploratory laparotomy, lysis of adhesions, decompression of bowel via enterotomy w/ primary repair, and Abthera VAC placement on 12/6; s/p take down of Abthera, washout and re-application of the Abthera vac on 12/8. 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 s/p L chest tube placement by IR 12/30 for pleural effusion now s/p VATS, with chest tube insertion for drainage of pleural effusion. RRT called 1/13 AM for hypoxia, patient transferred back to SICU.  Patient continued SOB, chest drain possible obstruction expanding. Patent taken back to OR emergently 1/15 for clot evac and VATS.     PLAN:  - OR today for ostomy reversal  - Monitor Ileostomy output  - OOB to chair  - Continue Lomotil, loperamide, tincture of opium, octreotide  - Appreciate continued SICU care  - appreciate cards/nutrition input  - d/w pt at bedside in detail     [facility]  [id number]

## 2020-02-04 NOTE — PROGRESS NOTE ADULT - SUBJECTIVE AND OBJECTIVE BOX
GENERAL SURGERY PROGRESS NOTE    SUBJECTIVE/24 HOUR EVENTS:   - Kept NPO after midnight for scheduled Ileostomy reversal 2/4  - Received one liter bolus for negative fluid balance  - Patient seen and examined at bedside  - Patient feels well, tolerating diet, on TPN, oob/ambi, pain controlled, denies f/c/n/v/d/sob    OBJECTIVE  PHYSICAL EXAM:  General: Appears well, NAD  Neuro: AAOx3  CHEST: Clear to auscultation bilaterally,  CV: Regular rate and rhythm  Abdomen: soft, nontender, nondistended, no rebound or guarding, ostomy pink, intact, draining semisolid  Extremities: Grossly symmetric    V/S:  Vital Signs Last 24 Hrs  T(C): 37 (04 Feb 2020 08:35), Max: 37.1 (03 Feb 2020 23:00)  T(F): 98.6 (04 Feb 2020 07:00), Max: 98.8 (03 Feb 2020 23:00)  HR: 85 (04 Feb 2020 08:35) (71 - 92)  BP: 114/57 (04 Feb 2020 08:35) (84/53 - 114/57)  BP(mean): 82 (04 Feb 2020 08:35) (64 - 82)  RR: 21 (04 Feb 2020 08:35) (18 - 36)  SpO2: 97% (04 Feb 2020 08:35) (93% - 100%)    --------------------------------------------------------------------------------------------------  I/Os:    03 Feb 2020 07:01  -  04 Feb 2020 07:00  --------------------------------------------------------  IN:    fat emulsion (Fish Oil and Plant Based) 20% Infusion: 270.4 mL    Oral Fluid: 970 mL    Solution: 50 mL    TPN (Total Parenteral Nutrition): 1992 mL  Total IN: 3282.4 mL    OUT:    Ileostomy: 2000 mL    Incontinent per Condom Catheter: 100 mL    Voided: 1100 mL  Total OUT: 3200 mL    Total NET: 82.4 mL      04 Feb 2020 07:01  -  04 Feb 2020 10:08  --------------------------------------------------------  IN:    TPN (Total Parenteral Nutrition): 83 mL  Total IN: 83 mL    OUT:    Ileostomy: 60 mL    Voided: 100 mL  Total OUT: 160 mL    Total NET: -77 mL        --------------------------------------------------------------------------------------------------  LABS:                        7.7    9.78  )-----------( 319      ( 03 Feb 2020 21:38 )             25.1     03 Feb 2020 21:38    137    |  100    |  28     ----------------------------<  117    4.7     |  28     |  0.48     Ca    8.5        03 Feb 2020 21:38  Phos  3.8       03 Feb 2020 21:38  Mg     1.9       03 Feb 2020 21:38    TPro  7.0    /  Alb  2.8    /  TBili  0.7    /  DBili  0.3    /  AST  22     /  ALT  30     /  AlkPhos  124    03 Feb 2020 21:38    PT/INR - ( 03 Feb 2020 13:58 )   PT: 13.0 sec;   INR: 1.14 ratio         PTT - ( 03 Feb 2020 13:58 )  PTT:43.1 sec      LIVER FUNCTIONS - ( 03 Feb 2020 21:38 )  Alb: 2.8 g/dL / Pro: 7.0 g/dL / ALK PHOS: 124 U/L / ALT: 30 U/L / AST: 22 U/L / GGT: x           --------------------------------------------------------------------------------------------------  MEDICATIONS  (STANDING):  albuterol/ipratropium for Nebulization. 3 milliLiter(s) Nebulizer every 6 hours  buDESOnide    Inhalation Suspension 0.5 milliGRAM(s) Inhalation every 12 hours  chlorhexidine 2% Cloths 1 Application(s) Topical <User Schedule>  diphenoxylate/atropine 2 Tablet(s) Oral <User Schedule>  enoxaparin Injectable 40 milliGRAM(s) SubCutaneous daily  ergocalciferol 24748 Unit(s) Oral every week  fat emulsion (Fish Oil and Plant Based) 20% Infusion 20.8 mL/Hr (20.8 mL/Hr) IV Continuous <Continuous>  loperamide 16 milliGRAM(s) Oral <User Schedule>  nystatin/triamcinolone Cream 1 Application(s) Topical two times a day  octreotide  Injectable 100 MICROGram(s) SubCutaneous three times a day  opium Tincture 6 milliGRAM(s) Oral <User Schedule>  pantoprazole    Tablet 40 milliGRAM(s) Oral before breakfast  Parenteral Nutrition - Adult 1 Each (83 mL/Hr) TPN Continuous <Continuous>  psyllium Powder 1 Packet(s) Oral three times a day  tamsulosin 0.4 milliGRAM(s) Oral daily    MEDICATIONS  (PRN):  acetaminophen   Tablet .. 975 milliGRAM(s) Oral every 6 hours PRN Mild Pain (1 - 3)  diphenhydramine 2%/zinc acetate 0.1% Cream 1 Application(s) Topical two times a day PRN Itching  ondansetron Injectable 4 milliGRAM(s) IV Push every 6 hours PRN Nausea and/or Vomiting

## 2020-02-04 NOTE — BRIEF OPERATIVE NOTE - ANTIBIOTIC PROTOCOL
Followed protocol
Followed protocol/zosyn

## 2020-02-04 NOTE — BRIEF OPERATIVE NOTE - NSICDXBRIEFPROCEDURE_GEN_ALL_CORE_FT
PROCEDURES:  VATS, with chest tube insertion for drainage of pleural effusion 10-Isma-2020 12:39:38 Left Bobo Worrell
PROCEDURES:  Abdominal washout 08-Dec-2019 18:16:27  Jared Preciado  Exploratory laparotomy 06-Dec-2019 22:11:24  Parker Mason
PROCEDURES:  Colectomy, partial, with mucous fistula and colostomy or ileostomy creation 10-Dec-2019 23:29:48  Jared Preciado  End ileostomy 10-Dec-2019 23:29:16  Jared Preciado  Abdominal washout 08-Dec-2019 18:16:27  Jared Preciado  Exploratory laparotomy 06-Dec-2019 22:11:24  Parker Mason
PROCEDURES:  Lysis of intestinal adhesions 06-Dec-2019 22:11:32  Parker Mason  Exploratory laparotomy 06-Dec-2019 22:11:24  Parker Mason
PROCEDURES:  Closure, end ileostomy 04-Feb-2020 13:54:58  Joey Sampson  Lysis of intestinal adhesions 06-Dec-2019 22:11:32  Parker Mason  Exploratory laparotomy 06-Dec-2019 22:11:24  Parker Mason
PROCEDURES:  Evacuation, hematoma, lung, using VATS 15-Isma-2020 19:42:27  Manuel Vang

## 2020-02-04 NOTE — BRIEF OPERATIVE NOTE - NSICDXBRIEFPOSTOP_GEN_ALL_CORE_FT
POST-OP DIAGNOSIS:  Pleural effusion 10-Isma-2020 12:40:01  Bobo Worrell
POST-OP DIAGNOSIS:  Small bowel obstruction 06-Dec-2019 22:12:04  Parker Mason
POST-OP DIAGNOSIS:  Acidosis 06-Dec-2019 22:12:10  Parker Mason  Small bowel obstruction 06-Dec-2019 22:12:04  Parker Mason
POST-OP DIAGNOSIS:  Small bowel obstruction 06-Dec-2019 22:12:04  Parker Mason
POST-OP DIAGNOSIS:  High output ileostomy 04-Feb-2020 13:55:25  Joey Sampson S

## 2020-02-04 NOTE — BRIEF OPERATIVE NOTE - NSICDXBRIEFPREOP_GEN_ALL_CORE_FT
PRE-OP DIAGNOSIS:  Pleural effusion 10-Isma-2020 12:39:52  Bobo Worrell
PRE-OP DIAGNOSIS:  Bowel obstruction 08-Dec-2019 18:18:01  Jared Preciado  Open wound of abdomen 08-Dec-2019 18:17:19  Jared Preciado
PRE-OP DIAGNOSIS:  Acidosis 06-Dec-2019 22:11:57  Parker Mason  Small bowel obstruction 06-Dec-2019 22:11:44  Parker Mason
PRE-OP DIAGNOSIS:  Bowel obstruction 08-Dec-2019 18:18:01  Jared Preciado  Open wound of abdomen 08-Dec-2019 18:17:19  Jared Preciado
PRE-OP DIAGNOSIS:  High output ileostomy 04-Feb-2020 13:55:17  Joey Sampson S

## 2020-02-04 NOTE — CHART NOTE - NSCHARTNOTEFT_GEN_A_CORE
Dermatology Chart Note    HPI:    FOR REFERENCE, DELETE: [[[HPI:  73 y/o with PMhx of COPD and ETOH abuse presenting with abdominal pain x2 days. Reports that pain felt more like gas pain, and had similar episodes in the past. Felt nauseous and couldn't vomit. Patient forced himself to vomit had hematemesis. Unable to tolerate PO in last day. Reports unable to pass gas or have BM in 3 days. Patient reports that he does not follow up with a PMD regularly, and can't recall last time he saw doctor. Reports he had colonoscopy recently with no significant findings. Last drink was 3 years ago. Reports some difficulty breathing. Denies fevers, chills, chest pain, or urinary changes. (06 Dec 2019 18:27)  ]]]]    PHYSICAL EXAM:  Vital Signs Last 24 Hrs  T(C): 36.9 (04 Feb 2020 15:00), Max: 37.1 (03 Feb 2020 23:00)  T(F): 98.4 (04 Feb 2020 15:00), Max: 98.8 (03 Feb 2020 23:00)  HR: 88 (04 Feb 2020 15:45) (71 - 92)  BP: 154/75 (04 Feb 2020 15:30) (88/51 - 178/72)  BP(mean): 108 (04 Feb 2020 15:30) (64 - 108)  RR: 18 (04 Feb 2020 15:45) (18 - 36)  SpO2: 100% (04 Feb 2020 15:45) (97% - 100%)    Skin exam notable for:  The patient was alert and oriented X 3, well nourished, and in no apparent distress. Oropharynx showed no ulcerations. There was no visible lymphadenopathy. Conjunctiva were non-injected. There was no clubbing or edema of extremities.    The scalp, hair, face, eyebrows, lips, oropharynx , neck, chest, back, buttocks, extremities X 4, hands, feet, nails were examined. There was no hyperhidrosis or bromhidrosis.     The following lesions are noted:     ASSESSMENT/PLAN:      Discussed with primary team.  Discussed with attending,  . Formal rounds to be conducted on . Will update assessment and plan that time.    Oleg Nunez MD  PGY2, Dermatology Dermatology Chart Note    HPI:    73 y/o male presenting with high grade SBO s/p exploratory laparotomy, lysis of adhesions, decompression of bowel via enterotomy w/ primary repair, & Abthera VAC placement (12/06/2019); RTOR for re-exploration w/ Abthera VAC replacement (12/08/2019) to allow for demarcation of ischemic small bowel; RTOR for re-exploration, small bowel resection of 150 cm (150 cm remaining), ileocecetomy, end ileostomy, mucous fistula, and abdominal closure (12/10/2019); hospital course complicated by SVT, short bowel syndrome requiring repletions w/ IV fluids, malnutrition requiring TPN, intermittent episodes of hypotension, spontaneous left pneumothorax s/p pigtail catheter (12/15/2019-12/24/2019), left pleural effusion s/p pigtail catheter w/ IR (12/30/2019), dysphagia, and oral HSV lesions, placement of Pleurx on 1/10, now re-admitted to SICU with acute hypoxic respiratory failure s/p repeat VATS with 2 liter hemothorax removed. Respiratory failure resolved, now stable in SICU.    Dermatology is consulted for rash on back x several weeks. Itchy. Team has been applying Benadryl cream without much improvement.     PHYSICAL EXAM:  Vital Signs Last 24 Hrs  T(C): 36.9 (04 Feb 2020 15:00), Max: 37.1 (03 Feb 2020 23:00)  T(F): 98.4 (04 Feb 2020 15:00), Max: 98.8 (03 Feb 2020 23:00)  HR: 88 (04 Feb 2020 15:45) (71 - 92)  BP: 154/75 (04 Feb 2020 15:30) (88/51 - 178/72)  BP(mean): 108 (04 Feb 2020 15:30) (64 - 108)  RR: 18 (04 Feb 2020 15:45) (18 - 36)  SpO2: 100% (04 Feb 2020 15:45) (97% - 100%)    Skin exam notable for:  The patient was alert and oriented X 3, well nourished, and in no apparent distress. Oropharynx showed no ulcerations. There was no visible lymphadenopathy. Conjunctiva were non-injected. There was no clubbing or edema of extremities.    The scalp, hair, face, eyebrows, lips, oropharynx , neck, chest, back, buttocks, extremities X 4, hands, feet, nails were examined. There was no hyperhidrosis or bromhidrosis.     The following lesions are noted:     PE: Red eczematous papules and plaques on back, few on chest    ASSESSMENT/PLAN:    1) Irritant contact dermatitis 2/2 sweat  -Long hospitalization/stay in bed can contribute to this condition  -Stop benadryl cream  -start triamcinolone 0.1% ointment BID to affected areas.    Discussed with primary team.  Discussed with attending, Dr. Villalobos. Formal rounds to be conducted on 2/5. Will update assessment and plan that time.    Oleg Nunez MD  PGY2, Dermatology

## 2020-02-04 NOTE — CHART NOTE - NSCHARTNOTEFT_GEN_A_CORE
POST-OPERATIVE NOTE    Subjective:  Patient is s/p ileostomy reversal. Patient denies any n/v, chest pain, or SOB. Pain is currently well controlled. Recovering appropriately.    Objective:  Physical Exam:  General: NAD, resting comfortably in bed  Pulmonary: Nonlabored breathing, no respiratory distress  Cardiovascular: RRR  Abdominal: soft, mildly distended, appropriately tender around incisions, aquacel midline dressing with mild strike through in the mid/inferior portion, left wound dressing c/d/i  Extremities: WWP  : rosas in place draining clear yellow urine      Vital Signs Last 24 Hrs  T(C): 37.1 (04 Feb 2020 19:00), Max: 37.1 (03 Feb 2020 23:00)  T(F): 98.8 (04 Feb 2020 19:00), Max: 98.8 (03 Feb 2020 23:00)  HR: 90 (04 Feb 2020 21:00) (71 - 96)  BP: 95/53 (04 Feb 2020 21:00) (88/51 - 178/72)  BP(mean): 70 (04 Feb 2020 21:00) (64 - 110)  RR: 20 (04 Feb 2020 21:00) (16 - 36)  SpO2: 99% (04 Feb 2020 21:00) (97% - 100%)  I&O's Detail    03 Feb 2020 07:01  -  04 Feb 2020 07:00  --------------------------------------------------------  IN:    fat emulsion (Fish Oil and Plant Based) 20% Infusion: 270.4 mL    Oral Fluid: 970 mL    Solution: 50 mL    TPN (Total Parenteral Nutrition): 1992 mL  Total IN: 3282.4 mL    OUT:    Ileostomy: 2000 mL    Incontinent per Condom Catheter: 100 mL    Voided: 1100 mL  Total OUT: 3200 mL    Total NET: 82.4 mL      04 Feb 2020 07:01  -  04 Feb 2020 21:24  --------------------------------------------------------  IN:    fat emulsion (Fish Oil and Plant Based) 20% Infusion: 83.2 mL    lactated ringers.: 350 mL    Solution: 100 mL    TPN (Total Parenteral Nutrition): 1162 mL  Total IN: 1695.2 mL    OUT:    Ileostomy: 60 mL    Indwelling Catheter - Urethral: 665 mL    Voided: 100 mL  Total OUT: 825 mL    Total NET: 870.2 mL        enoxaparin Injectable 40  tamsulosin 0.4    PAST MEDICAL & SURGICAL HISTORY:  COPD with hypoxia  ETOHism  ETOH abuse  History of lumbosacral spine surgery  History of prostate surgery  History of appendectomy          LABS:                        9.5    14.95 )-----------( 329      ( 04 Feb 2020 14:24 )             30.3     02-04    133<L>  |  96  |  28<H>  ----------------------------<  209<H>  4.6   |  28  |  0.49<L>    Ca    10.1      04 Feb 2020 14:24  Phos  4.7     02-04  Mg     2.1     02-04    TPro  6.8  /  Alb  3.0<L>  /  TBili  1.0  /  DBili  0.5<H>  /  AST  34  /  ALT  40  /  AlkPhos  114  02-04    PT/INR - ( 03 Feb 2020 13:58 )   PT: 13.0 sec;   INR: 1.14 ratio         PTT - ( 03 Feb 2020 13:58 )  PTT:43.1 sec  CAPILLARY BLOOD GLUCOSE          Radiology and Additional Studies:    Assessment:  The patient is a 75y Male who is now several hours post-op from a ileostomy reversal.     Plan:  - Pain control as needed  - lovenox for DVT ppx  - OOB and ambulating as tolerated  - F/u AM labs    [facility]  [id number]

## 2020-02-04 NOTE — BRIEF OPERATIVE NOTE - OPERATION/FINDINGS
Video assisted thorascopic placement of Left Pleurx catheter.
Proximal 165cm of bowel viable/pink. Patchy areas of ischemia on antimesenteric zone of distal small bowel beyond this 165cm, overall much better appearing than at last laparotomy. However, given ongoing process of demarcation, and the length of small bowel that would need to be resected this time, opted to give more time for demarcation to see if can save more small bowel. Colon appeared viable. previously repaired decompressive enterotomy examined, well appearing and closed.  Abthera replaced.
exploratory laparotomy for high grade SBO with acidosis and concern for abdominal compartment syndrome, lysis of adhesions, decompression of small bowel with closure of enterotomy, distal half of small bowel dusky without signs of paul ischemia or necrosis, placement of abthera vac
extensive adhesions lysed.  R sided end ileostomy and mucous fistula taken down.  Small and large bowel mobilized.  Stapled anastomosis performed.
Resected about 150cm of small bowel, 150cm of small bowel remaining, all pink/healthy/peristalsing. Noted patchy areas of ischemia on cecum, so proximal ascending colon resected. End ileostomy and mucous fistula matured through one aperture.
Preoperative bronchoscopy with suctioning of thick mucous.   Left VATS. Evacuation of 2L blood from L chest.  Lung expanded at end of case.

## 2020-02-04 NOTE — PROGRESS NOTE ADULT - ASSESSMENT
73 y/o male presenting with high grade SBO s/p exploratory laparotomy, lysis of adhesions, decompression of bowel via enterotomy w/ primary repair, & Abthera VAC placement (12/06/2019); RTOR for re-exploration w/ Abthera VAC replacement (12/08/2019) to allow for demarcation of ischemic small bowel; RTOR for re-exploration, small bowel resection of 150 cm (150 cm remaining), ileocecetomy, end ileostomy, mucous fistula, and abdominal closure (12/10/2019); hospital course complicated by SVT, short bowel syndrome requiring repletions w/ IV fluids, malnutrition requiring TPN, intermittent episodes of hypotension, spontaneous left pneumothorax s/p pigtail catheter (12/15/2019-12/24/2019), left pleural effusion s/p pigtail catheter w/ IR (12/30/2019), dysphagia, and oral HSV lesions, placement of Pleurx on 1/10, now re-admitted to SICU with acute hypoxic respiratory failure s/p repeat VATS with 2 liter hemothorax removed. Respiratory failure resolved, now stable in SICU.    Neuro: no acute issues  - Pain control as needed with acetaminophen Pro Re Samantha.     Resp: COPD, spontaneous left pneumothorax s/p pigtail catheter, left pleural effusion s/p Pleurx 1/10.  Intubated for respiratory distress s/p VATS 01/15 with 2 Liter of blood evacuation s/p removal of chest tubes   - Pulmicort and Duoneb for COPD  - Will need outpatient follow-up with a pulmonologist for his COPD as patient does not currently have one  -F/u repeat AM CXR    CV: no acute issues  - Monitor hemodynamics, I&Os    GI: s/p exploratory laparotomy, [place], decompression of bowel via enterotomy w/ primary repair, & Abthera VAC placement (12/06/2019); re-exploration w/ ABthera VAC replacement (12/08/2019); re-exploration, small bowel resection of 150 cm (150 cm remaining), ileocecectomy end ileostomy, mucous fistula, and abdominal closure (12/10/2019); short bowel syndrome, malnutrition, dysphagia  - Mechanical soft diet + TPN  - Monitor ostomy output 1.5L past 24hrs  - Continue Lomotil, tincture of opium, loperamide, metamucil, and octreotide for short bowel syndrome  - Protonix to decrease gastric secretions  - Continue Vitamin D supplementation  -NPO after midnight for ileostomy reversal scheduled (2/4)     Renal: polyuria 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     Heme: anemia likely secondary to malnutrition / malabsorption  - Monitor CBC and coags   - Lovenox for VTE prophylaxis  -AM T&S prior to ileostomy reversal     ID: oral HSV lesions (s/p acyclovir 12/23/2019-01/02/2020); completed course of meropenem for Zosyn-resistant Pseudomonas PNA   - Monitor for fever, culture if febrile  -Derm to assess patient for rash     Endo: possible adrenal insufficiency  - Monitor glucose on BMPs   - Cosyntropin test indicative of adrenal insufficiency    Dispo: Surgical Intensive Care Unit.

## 2020-02-04 NOTE — PROGRESS NOTE ADULT - ATTENDING COMMENTS
Pt seen and examined. Chart reviewed. Resident note confirmed. Pt is a 75 year old male witha  medical history signficant for CAD/COPD/SIADH who presented to [facility] with abdominal pain. He underwent right colectomy for diverticulitis and second look laparotomy for small intestinal ischemia. Pt underwent subsequent small intestinal resection. He had prolonged resp failure and required vasopressor support. He developed a spont ptx/pleural effusion, requiring chest tube and subsequent VATS. He developed high ileostomy output and has been on maximal medical therapy. Pt is now 60 days post resection and returned to OR for reversal of ileostomy. No intraop events overnight. EBL 100ml.      PMH/PSH/MEDS/ALL/SH/FH/ROS:  Unchanged from H&P  Vitals/PE/Labs/radiographs:  Reviewed      A/p    Neuro:	Post op pain  	Continue pain control    CVS:	CAD  	S/p hypovolemia/hypotension  	Continue cardiac monitoring    Pulm:	COPD  	Atelectasis  	s/p ptx/effusion/VATS  	Continue ISP    GI:	S/p reversal of ileostomy  	s/p right colon rsn and ileal rsn  	Malnutrition  	Continue TPN  	Await return of bowel fcn    :	s/p CHI  	Monitor I's and O's  	  Heme:	Acute blood loss anema  	Monitor H/h    ID:	S/p diverticulitis  	S/p sepsis  	Cx for fever    Endo:	Continue glycemic control .

## 2020-02-04 NOTE — PROGRESS NOTE ADULT - SUBJECTIVE AND OBJECTIVE BOX
[facility] NUTRITION SUPPORT / TPN -- FOLLOW UP NOTE  --------------------------------------------------------------------------------    24 hour events/subjective:  Pt seen & examined prior to going to OR today    **Pt for OR.  Please DO NOT stop TPN.  If TPN needs to be stopped or runs out- Please start D10 at the same rate. A new bag of TPN will be ordered today as per protocol.**     Pt currently NPO for surgery  Pt had been tolerating diet w/o pain, n/v  No n/v  Continued high ostomy output w/ 2000mL over 24 hours       continue lomotil, imodium, octreotide, tincture of opium at max doses  Urine Output 1100mL  No cough/ cp/ palp/dyspnea/ sob  No f/c/s      Diet:  Diet, Mechanical Soft:   No Carb Prosource (1pkg = 15gms Protein)     Qty per Day:  1  Supplement Feeding Modality:  Oral  Ensure Enlive Cans or Servings Per Day:  2       Frequency:  Daily (01-24-20 @ 11:32)  Diet, NPO after Midnight:      NPO Start Date: 03-Feb-2020,   NPO Start Time: 23:59 (02-03-20 @ 06:41)      Appetite: [  ]Poor [ x ]Adequate [  ]Good  Caloric intake:  [  x ]  Adequate   [   ] Inadequate    ROS: General/ GI see HPI  all other systems negative      ALLERGIES & MEDICATIONS  --------------------------------------------------------------------------------  ALLERGIES  IV Contrast (Hives)    STANDING INPATIENT MEDICATIONS    albuterol/ipratropium for Nebulization. 3 milliLiter(s) Nebulizer every 6 hours  buDESOnide    Inhalation Suspension 0.5 milliGRAM(s) Inhalation every 12 hours  chlorhexidine 2% Cloths 1 Application(s) Topical <User Schedule>  diphenoxylate/atropine 2 Tablet(s) Oral <User Schedule>  enoxaparin Injectable 40 milliGRAM(s) SubCutaneous daily  ergocalciferol 97190 Unit(s) Oral every week  fat emulsion (Fish Oil and Plant Based) 20% Infusion 20.8 mL/Hr IV Continuous <Continuous>  loperamide 16 milliGRAM(s) Oral <User Schedule>  nystatin/triamcinolone Cream 1 Application(s) Topical two times a day  octreotide  Injectable 100 MICROGram(s) SubCutaneous three times a day  opium Tincture 6 milliGRAM(s) Oral <User Schedule>  pantoprazole    Tablet 40 milliGRAM(s) Oral before breakfast  Parenteral Nutrition - Adult 1 Each TPN Continuous <Continuous>  Parenteral Nutrition - Adult 1 Each TPN Continuous <Continuous>  psyllium Powder 1 Packet(s) Oral three times a day  tamsulosin 0.4 milliGRAM(s) Oral daily      PRN INPATIENT MEDICATION  acetaminophen   Tablet .. 975 milliGRAM(s) Oral every 6 hours PRN  diphenhydramine 2%/zinc acetate 0.1% Cream 1 Application(s) Topical two times a day PRN  ondansetron Injectable 4 milliGRAM(s) IV Push every 6 hours PRN        VITALS/PHYSICAL EXAM  --------------------------------------------------------------------------------  T(C): 37 (02-04-20 @ 08:35), Max: 37.1 (02-03-20 @ 23:00)  HR: 85 (02-04-20 @ 08:35) (71 - 92)  BP: 114/57 (02-04-20 @ 08:35) (84/53 - 114/57)  RR: 21 (02-04-20 @ 08:35) (18 - 36)  SpO2: 97% (02-04-20 @ 08:35) (93% - 100%)  Wt(kg): --  Height (cm): 175 (02-04-20 @ 08:35)  Weight (kg): 54 (02-04-20 @ 08:35)  BMI (kg/m2): 17.6 (02-04-20 @ 08:35)  BSA (m2): 1.66 (02-04-20 @ 08:35)      02-03-20 @ 07:01  -  02-04-20 @ 07:00  --------------------------------------------------------  IN: 3282.4 mL / OUT: 3200 mL / NET: 82.4 mL    02-04-20 @ 07:01  -  02-04-20 @ 11:47  --------------------------------------------------------  IN: 166 mL / OUT: 160 mL / NET: 6 mL      PHYSICAL EXAM:  --------------------------------------------------------------------------------  	Gen: guarded but stable, A&Ox3, NC O2  	HEENT: NC/AT, PERRL, supple neck, trachea midline, mucosa moist              GI: (+) BS, softly distended, non tender                    midline dressing c/d/i w/o drainage                   (+)ostomy pink viable- thick loose BM green/ tan colored              MSK: FROM x4, no contractures  	Vascular: Equally Warm,  no edema,  no clubbing, cyanosis,                      RUE PICC w/o sx infection   	Neuro: No focal deficits, intact sensation, weakened strength BLE>BUE  	Psych: Normal affect and mood              skin: moist with good turgor      LABS/ CULTURES/ RADIOLOGY:              7.7    9.78  >-----------<  319      [02-03-20 @ 21:38]              25.1     137  |  100  |  28  ----------------------------<  117      [02-03-20 @ 21:38]  4.7   |  28  |  0.48        Ca     8.5     [02-03-20 @ 21:38]      iCa    1.12     [02-03 @ 00:40]      Mg     1.9     [02-03-20 @ 21:38]      Phos  3.8     [02-03-20 @ 21:38]    TPro  7.0  /  Alb  2.8  /  TBili  0.7  /  DBili  0.3  /  AST  22  /  ALT  30  /  AlkPhos  124  [02-03-20 @ 21:38]    PT/INR: PT 13.0 , INR 1.14       [02-03-20 @ 13:58]  PTT: 43.1       [02-03-20 @ 13:58]      CAPILLARY BLOOD GLUCOSE  Prealbumin, Serum: 21 mg/dL (02-03-20 @ 23:50)  Prealbumin, Serum: 19 mg/dL (02-03-20 @ 07:18)  Prealbumin, Serum: 18 mg/dL (02-02-20 @ 09:48)  Prealbumin, Serum: 18 mg/dL (01-31-20 @ 07:40)  Prealbumin, Serum: 19 mg/dL (01-27-20 @ 04:52)    Triglycerides, Serum: 59 mg/dL (02.03.20 @ 00:24)  Triglycerides, Serum: 61 mg/dL (02.02.20 @ 01:41)  Triglycerides, Serum: 51 mg/dL (01.31.20 @ 04:10)  Triglycerides, Serum: 83 mg/dL (01.27.20 @ 00:14)  Triglycerides, Serum: 50 mg/dL (01.07.20 @ 01:05)

## 2020-02-04 NOTE — PROGRESS NOTE ADULT - SUBJECTIVE AND OBJECTIVE BOX
HISTORY  75y Male past medical history of COPD and EtOH dependence who presented on 12/6/2019 with abdominal pain, nausea, hematemesis, and poor PO intake for ~2-3 days. In the ED, he was found to be hypotensive & tachycardic. Labs revealed an CHI and lactic acidosis. Imaging revealed a high grade SBO in the RLQ on CT scan. Hospital course is as follows:  12/06 - s/p exploratory laparotomy, lysis of adhesions, decompression of bowel via enterotomy w/ primary repair, and Abthera VAC placement as the distal 50% of the bowel appeared dusky but was still viable, admitted to SICU post-operatively as he was on vasopressor support and was left intubated  12/08 - s/p re-exploration, proximal 165 cm of small bowel appeared pink & viable but beyond that had patchy areas of ischemia so decision was made to give the bowel more time to demarcate before resecting in order to preserve as much small bowel as possible so Abthera VAC was replaced  12/09 - s/p re-exploration, small bowel resection of 150 cm (150 cm remaining), ileocecetomy, end ileostomy, mucous fistula, and abdominal closure  12/11 - extubated to BiPAP, noted to be in SVT that was rate controlled w/ metoprolol  12/14 - started on TPN  12/15 - noted to have spontaneous left pneumothorax s/p left pigtail catheter placement, noted to be in SVT again so amiodarone started, high ileostomy output noted so concern for short bowel syndrome  12/16 - amiodarone discontinued, started on beta blocker with metoprolol  12/18 - started Lomotil and ileostomy repletions with IV fluids  12/19 - started Imodium, left pigtail catheter was placed to water seal but pneumothorax noted on follow-up CXR so placed back to suction  12/20 - started tincture of opium, concern for aspiration so changed diet from regular to dysphagia 1 pureed with nectar-thickened liquids  12/22 - left pigtail catheter placed to water seal with no pneumothorax noted on follow-up CXR, CT chest with moderate left pleural effusion not being drained by pigtail catheter  12/23 - 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  12/24 - left pigtail catheter discontinued  12/28 - started Ancef for erythematous midline wound  12/29 - beta blocker discontinued for intermittent episodes of hypotension  12/30 - left pigtail catheter placement by IR with drainage of serous transudative fluid  01/02 - FEES demonstrating penetration with thin liquids so patient kept on dysphagia 1 pureed with nectar-thickened liquids  01/07 - Transferred to floors  01/10 - s/p left VATS w/ PleurX catheter placement  01/11 - evaluated by speech & swallow, advanced diet to mechanical soft with thin liquids  01/12 - PleurX catheter placed to water seal with on pneumothorax on follow-up CXR  01/13 - RRT for hypoxemia, placed on BiPAP, PleurX catheter placed back to suction, started vancomycin & Zosyn for possible HCAP  01/14 - noted to have minimal output from PleurX catheter, lung ultrasound with large left pleural effusion concerning for hemothorax, multiple episodes of bradycardia secondary to hypoxemia, remains on BiPAP  01/15 - worsening respiratory distress requiring intubation, hypotensive requiring vasopressor support, CT chest with large left hemothorax w/ trace pneumothorax & extensive subcutaneous emphysema so taken to OR emergently for left VATS, evacuation fo 2 L of clot, and placement of two left chest tubes  01/16 - extubated, weaned off vasopressor support  01/17 - two left chest tubes placed to water seal with no pneumothorax noted on follow-up CXR  02/01- Remains ileostomy reversal planning, chest tubes discontinued.       24 HOUR EVENTS:  --Kept NPO after midnight for scheduled Ileostomy reversal 2/4    SUBJECTIVE/ROS:  [x] A ten-point review of systems was otherwise negative except as noted.  [ ] Due to altered mental status/intubation, subjective information were not able to be obtained from the patient. History was obtained, to the extent possible, from review of the chart and collateral sources of information.      NEURO  Exam: awake, alert, oriented  Meds: acetaminophen   Tablet .. 975 milliGRAM(s) Oral every 6 hours PRN Mild Pain (1 - 3)  ondansetron Injectable 4 milliGRAM(s) IV Push every 6 hours PRN Nausea and/or Vomiting  opium Tincture 6 milliGRAM(s) Oral <User Schedule>  [x] Adequacy of sedation and pain control has been assessed and adjusted      RESPIRATORY  RR: 21 (02-03-20 @ 23:00) (18 - 35)  SpO2: 98% (02-03-20 @ 23:00) (93% - 100%)  Wt(kg): --54kg  Exam: unlabored, clear to auscultation bilaterally    Meds: albuterol/ipratropium for Nebulization. 3 milliLiter(s) Nebulizer every 6 hours  buDESOnide    Inhalation Suspension 0.5 milliGRAM(s) Inhalation every 12 hours      CARDIOVASCULAR  HR: 82 (02-03-20 @ 23:00) (74 - 96)  BP: 100/56 (02-03-20 @ 23:00) (84/52 - 119/56)  BP(mean): 75 (02-03-20 @ 23:00) (63 - 81)  ABP: --  ABP(mean): --  Wt(kg): --  CVP(cm H2O): --      Exam: regular rate and rhythm  Cardiac Rhythm: sinus  Perfusion     [x]Adequate   [ ]Inadequate  Mentation   [x]Normal       [ ]Reduced  Extremities  [x]Warm         [ ]Cool  Volume Status [ ]Hypervolemic [x]Euvolemic [ ]Hypovolemic  Meds: tamsulosin 0.4 milliGRAM(s) Oral daily      GI/NUTRITION  Exam: soft, nontender, nondistended, incision C/D/I  Diet: NPO after midnight   Meds: diphenoxylate/atropine 2 Tablet(s) Oral <User Schedule>  loperamide 16 milliGRAM(s) Oral <User Schedule>  pantoprazole    Tablet 40 milliGRAM(s) Oral before breakfast  psyllium Powder 1 Packet(s) Oral three times a day      GENITOURINARY  I&O's Detail    02-02 @ 07:01  -  02-03 @ 07:00  --------------------------------------------------------  IN:    fat emulsion (Fish Oil and Plant Based) 20% Infusion: 270.4 mL    Oral Fluid: 840 mL    Solution: 1100 mL    TPN (Total Parenteral Nutrition): 1992 mL  Total IN: 4202.4 mL    OUT:    Ileostomy: 2125 mL    Voided: 2895 mL  Total OUT: 5020 mL    Total NET: -817.6 mL      02-03 @ 07:01  -  02-04 @ 00:05  --------------------------------------------------------  IN:    fat emulsion (Fish Oil and Plant Based) 20% Infusion: 124.8 mL    Oral Fluid: 970 mL    Solution: 50 mL    TPN (Total Parenteral Nutrition): 1328 mL  Total IN: 2472.8 mL    OUT:    Ileostomy: 1800 mL    Voided: 900 mL  Total OUT: 2700 mL    Total NET: -227.2 mL        02-03    137  |  100  |  28<H>  ----------------------------<  117<H>  4.7   |  28  |  0.48<L>    Ca    8.5      03 Feb 2020 21:38  Phos  3.8     02-03  Mg     1.9     02-03    TPro  7.0  /  Alb  2.8<L>  /  TBili  0.7  /  DBili  0.3<H>  /  AST  22  /  ALT  30  /  AlkPhos  124<H>  02-03    [ ] Martinez catheter, indication: N/A  Meds: ergocalciferol 86798 Unit(s) Oral every week  fat emulsion (Fish Oil and Plant Based) 20% Infusion 20.8 mL/Hr IV Continuous <Continuous>  Parenteral Nutrition - Adult 1 Each TPN Continuous <Continuous>        HEMATOLOGIC  Meds: enoxaparin Injectable 40 milliGRAM(s) SubCutaneous daily    [x] VTE Prophylaxis                        7.7    9.78  )-----------( 319      ( 03 Feb 2020 21:38 )             25.1     PT/INR - ( 03 Feb 2020 13:58 )   PT: 13.0 sec;   INR: 1.14 ratio         PTT - ( 03 Feb 2020 13:58 )  PTT:43.1 sec  Transfusion     [ ] PRBC   [ ] Platelets   [ ] FFP   [ ] Cryoprecipitate      INFECTIOUS DISEASES  WBC Count: 9.78 K/uL (02-03 @ 21:38)  WBC Count: 8.70 K/uL (02-03 @ 00:24)    RECENT CULTURES:    Meds:       ENDOCRINE  CAPILLARY BLOOD GLUCOSE        Meds: octreotide  Injectable 100 MICROGram(s) SubCutaneous three times a day        ACCESS DEVICES:  [ ] Peripheral IV  [ ] Central Venous Line	[ ] R	[ ] L	[ ] IJ	[ ] Fem	[ ] SC	Placed:   [ ] Arterial Line		[ ] R	[ ] L	[ ] Fem	[ ] Rad	[ ] Ax	Placed:   [ ] PICC:					[ ] Mediport  [ ] Urinary Catheter, Date Placed:   [x] Necessity of urinary, arterial, and venous catheters discussed    OTHER MEDICATIONS:  chlorhexidine 2% Cloths 1 Application(s) Topical <User Schedule>  diphenhydramine 2%/zinc acetate 0.1% Cream 1 Application(s) Topical two times a day PRN  nystatin/triamcinolone Cream 1 Application(s) Topical two times a day      CODE STATUS: full code     IMAGING:   EXAM:  CT ABDOMEN AND PELVIS IC                          EXAM:  CT CHEST IC                            PROCEDURE DATE:  01/15/2020        INTERPRETATION:  CLINICAL INFORMATION: Leukocytosis. Hemothorax.    COMPARISON: CT chest abdomen pelvis 12/30/2019    FINDINGS:    CHEST:     LUNGS AND LARGE AIRWAYS: There is complete atelectasis of the left lower lobe and partial atelectasis of the left upper lobe. Partial compressive atelectasis of the right lower lobe. Emphysema. Nodular opacities in the right lower lobe, unchanged.  PLEURA: There is a left chest tubes. There is a large loculated left pleural effusion with areas of high attenuation, increased in size. Trace left pneumothorax. There is a small to moderate right pleural effusion.  VESSELS: Atherosclerotic changes of the aorta and coronary arteries. Right subclavian approach PICC tip in the SVC.  HEART: Heart size is normal. No pericardial effusion.  MEDIASTINUM AND MIKI: No lymphadenopathy.  CHEST WALL AND LOWER NECK: Extensive subcutaneous emphysema along the left chest wall. In addition, high attenuation is noted in the left chest wall adjacent to the pleural catheter measuring approximately 3.4 x 2.1 cm (3:146), suspicious for hematoma.    ABDOMEN AND PELVIS:    LIVER: Subcentimeter hypodense focus in the right hepatic lobe, too small to characterize, but unchanged.  BILE DUCTS: Normal caliber.  GALLBLADDER: Within normal limits.  SPLEEN: Within normal limits.  PANCREAS: Within normal limits.  ADRENALS: Within normal limits.  KIDNEYS/URETERS:Mild hydronephrosis bilaterally. Bilateral nonobstructing calculi.    BLADDER: Markedly distended urinary bladder.  REPRODUCTIVE ORGANS: Prostatectomy.    BOWEL: Right lower quadrant ileostomy. No bowel obstruction. Colonic diverticulosis.  PERITONEUM: Trace ascites. Multiple nonspecific peritoneal calcifications, unchanged. A small droplet of gas in the right anterior abdomen adjacent to the ileostomy (3:208 and 5:43) may be extraluminal.  VESSELS: Atherosclerotic changes.  RETROPERITONEUM/LYMPHNODES: No lymphadenopathy.    ABDOMINAL WALL: Diffuse anasarca. Postsurgical changes of the anterior abdominal wall. The previous reported fluid midline abdominal wall collection has resolved.   BONES: Degenerative changes in the spine.    IMPRESSION:     Loculated large left pleural effusion with areas of hyperattenuation likely secondary to hematoma. Interval increase in size of the loculated left pleural effusion since 12/30/2019. Left sided chest tube terminating in the pleural space. Trace left pneumothorax.     Extensive left subcutaneous emphysema. Hematoma is also noted along the left lateral chest wall.    Interval resolution of the midline anterior abdominal wall fluid collection.    Markedly distended urinary bladder with mild bilateral hydronephrosis.    A small droplet of gas in the right anterior abdomen adjacent to the ileostomy may be extraluminal. Repeat CT with oral contrast may be helpful for further evaluation.     < end of copied text >

## 2020-02-04 NOTE — PRE-ANESTHESIA EVALUATION ADULT - NSANTHPMHFT_GEN_ALL_CORE
s/p bowel resection  s/p VATS for effusions, collections  intubated today for resp distress
73 y/o M presenting with septic shock and high grade SBO s/p exploratory laparotomy, lysis of adhesions, decompression of bowel via enterotomy w/ primary repair, and Abthera VAC placement on 12/6;  s/p SBR and end ileostomy/mucus fistula on 12/10 c/b acute respiratory distress s/p L chest tube placement by IR 12/30 for pleural effusion s/p PICC line on TPN.
75 y/o male presenting with high grade SBO s/p exploratory laparotomy, lysis of adhesions, decompression of bowel via enterotomy w/ primary repair, & Abthera VAC placement (12/06/2019); RTOR for re-exploration w/ Abthera VAC replacement (12/08/2019) to allow for demarcation of ischemic small bowel; RTOR for re-exploration, small bowel resection of 150 cm (150 cm remaining), ileocecetomy, end ileostomy, mucous fistula, and abdominal closure (12/10/2019); hospital course complicated by SVT, short bowel syndrome requiring repletions w/ IV fluids, malnutrition requiring TPN, intermittent episodes of hypotension, spontaneous left pneumothorax s/p pigtail catheter (12/15/2019-12/24/2019), left pleural effusion s/p pigtail catheter w/ IR (12/30/2019), dysphagia, and oral HSV lesions, placement of Pleurx on 1/10, now re-admitted to SICU with acute hypoxic respiratory failure s/p repeat VATS with 2 liter hemothorax removed. Respiratory failure resolved, now stable in SICU.

## 2020-02-04 NOTE — PROGRESS NOTE ADULT - SUBJECTIVE AND OBJECTIVE BOX
Subjective: Patient seen and examined. No new events except as noted.   remains in ICU   - Kept NPO after midnight for scheduled Ileostomy reversal 2/4  - Received one liter bolus for negative fluid balance  - Patient seen and examined at bedside  - Patient feels well, tolerating diet, on TPN, oob/ambi, pain controlled, denies f/c/n/v/d/sob      REVIEW OF SYSTEMS:    CONSTITUTIONAL:+ weakness, fevers or chills  EYES/ENT: No visual changes;  No vertigo or throat pain   NECK: No pain or stiffness  RESPIRATORY: No cough, wheezing, hemoptysis; No shortness of breath  CARDIOVASCULAR: No chest pain or palpitations  GASTROINTESTINAL: No abdominal or epigastric pain. No nausea, vomiting, or hematemesis; No diarrhea or constipation. No melena or hematochezia.  GENITOURINARY: No dysuria, frequency or hematuria  NEUROLOGICAL: No numbness or weakness  SKIN: No itching, burning, rashes, or lesions   All other review of systems is negative unless indicated above.    MEDICATIONS:  MEDICATIONS  (STANDING):  albuterol/ipratropium for Nebulization. 3 milliLiter(s) Nebulizer every 6 hours  buDESOnide    Inhalation Suspension 0.5 milliGRAM(s) Inhalation every 12 hours  chlorhexidine 2% Cloths 1 Application(s) Topical <User Schedule>  diphenoxylate/atropine 2 Tablet(s) Oral <User Schedule>  enoxaparin Injectable 40 milliGRAM(s) SubCutaneous daily  ergocalciferol 43428 Unit(s) Oral every week  fat emulsion (Fish Oil and Plant Based) 20% Infusion 20.8 mL/Hr (20.8 mL/Hr) IV Continuous <Continuous>  loperamide 16 milliGRAM(s) Oral <User Schedule>  nystatin/triamcinolone Cream 1 Application(s) Topical two times a day  octreotide  Injectable 100 MICROGram(s) SubCutaneous three times a day  opium Tincture 6 milliGRAM(s) Oral <User Schedule>  pantoprazole    Tablet 40 milliGRAM(s) Oral before breakfast  Parenteral Nutrition - Adult 1 Each (83 mL/Hr) TPN Continuous <Continuous>  Parenteral Nutrition - Adult 1 Each (83 mL/Hr) TPN Continuous <Continuous>  psyllium Powder 1 Packet(s) Oral three times a day  tamsulosin 0.4 milliGRAM(s) Oral daily      PHYSICAL EXAM:  T(C): 37 (02-04-20 @ 08:35), Max: 37.1 (02-03-20 @ 23:00)  HR: 85 (02-04-20 @ 08:35) (71 - 92)  BP: 114/57 (02-04-20 @ 08:35) (84/53 - 114/57)  RR: 21 (02-04-20 @ 08:35) (18 - 36)  SpO2: 97% (02-04-20 @ 08:35) (93% - 100%)  Wt(kg): --  I&O's Summary    03 Feb 2020 07:01  -  04 Feb 2020 07:00  --------------------------------------------------------  IN: 3282.4 mL / OUT: 3200 mL / NET: 82.4 mL    04 Feb 2020 07:01  -  04 Feb 2020 12:30  --------------------------------------------------------  IN: 166 mL / OUT: 160 mL / NET: 6 mL      Height (cm): 175 (02-04 @ 08:35)  Weight (kg): 54 (02-04 @ 08:35)  BMI (kg/m2): 17.6 (02-04 @ 08:35)  BSA (m2): 1.66 (02-04 @ 08:35)    Appearance: Normal	  HEENT:   Normal oral mucosa, PERRL, EOMI	  Lymphatic: No lymphadenopathy , no edema  Cardiovascular: Normal S1 S2, No JVD, No murmurs , Peripheral pulses palpable 2+ bilaterally  Respiratory: Lungs clear to auscultation, normal effort 	  Gastrointestinal:  Soft, Non-tender, + Ileostomy  Skin: No rashes, No ecchymoses, No cyanosis, warm to touch  Musculoskeletal: Normal range of motion, normal strength  Psychiatry:  Mood & affect appropriate  Ext: No edema      LABS:    CARDIAC MARKERS:                                7.7    9.78  )-----------( 319      ( 03 Feb 2020 21:38 )             25.1     02-03    137  |  100  |  28<H>  ----------------------------<  117<H>  4.7   |  28  |  0.48<L>    Ca    8.5      03 Feb 2020 21:38  Phos  3.8     02-03  Mg     1.9     02-03    TPro  7.0  /  Alb  2.8<L>  /  TBili  0.7  /  DBili  0.3<H>  /  AST  22  /  ALT  30  /  AlkPhos  124<H>  02-03    proBNP:   Lipid Profile:   HgA1c:   TSH:             TELEMETRY: 	SR    ECG:  	  RADIOLOGY:   DIAGNOSTIC TESTING:  [ ] Echocardiogram:  [ ]  Catheterization:  [ ] Stress Test:    OTHER:

## 2020-02-04 NOTE — PROGRESS NOTE ADULT - ASSESSMENT
A/P: 75 year old male w/ PMH of COPD and EtOH dependence with PSH/o appy, prostate surgery, & spine surgery  septic shock and high grade SBO s/p exploratory laparotomy, lysis of adhesions, decompression of bowel via enterotomy w/ primary repair, and Abthera VAC placement on 12/6; s/p take down of Abthera, washout and re-application of the Abthera vac on 12/8. Now s/p SBR and end ileostomy on 12/10.   TPN consulted to assist w/ management of pt's nutrition in pt w/ prolonged hospital course now tolerating diet but has high Ileostomy output.  Pt s/p Lt Chest Pigtail for effusion in IR with persistent high output on 1/10/2020 pt had VATs & placement of Left PleurX catheter. Pt op pt developed hemothorax and Respiratory Distress.  Pt is s/p Lt chest Evacuation of 2L blood w/ placement of CT x2 on 1/15/2020 for Lt Pleural Effusion that's resolving and doing well after CT removed and resolved Rt PNA vs Pleural Effusion.     Pt NPO for OR today for Ileostomy reversal  **Pt for OR.  Please DO NOT stop TPN.  If TPN needs to be stopped or runs out- Please start D10 at the same rate. A new bag of TPN will be ordered for today as per protocol.**     Pt w/ improving severe Protein-Calorie Malnutrition-         Short Gut Syndrome w/Malabsorption- increasing back to GOAL TPN to Nutritionally optimize             patient going to the OR- increased Preablbumin & Albumin noted  TPN GOAL:    Amino Acids 120g, Dextrose 210g, and Lipids 50g in 2000mL with 3mL MTE & 10mL MVI                     -Concern for adrenal insufficiency - am Cortisol &  ACTH Stim test reviewed        Consider close monitoring vs Stress dose Steroids  -HypoCa- improving w/ increased Ca in TPN to 14mEq- continue to monitor        Improved Ca levels helpful for BP & muscle contraction  -HypoMg- improving w/ increased Mg to 14mEq  -HypoPhos- improving w/-increased NaPhos & will continue to monitor   -Fecal fat testing suggestive of decreased absorption of fat -         TPN w/ MVI to compensate for low Vit E & A        Vit D levels low- being supplemented w/Ergocalciferol         Vit K INR/ PT near normal suggestive that it is being absorbed   -Edema greatly improved and Fluid overload resolved -          tolerating increased TPN volume & osm          Pt required IL bolus yesterday          will continue to monitor closely post op  -Urine Output- 1100mL- decreased- will continue to monitor  -Strict Intake and Output -            Continue to monitor while on octreotide, lomotil, tincture of opium, & imodium  -Hyperglycemia-improving      Fingersticks & ISS coverage - as per SICU  -Weights three times a week  -Daily CMP, Mg, Ionized Ca, Phosphorus,        and Weekly Triglycerides and Pre-albumin  Continue as per SICU/Surgery, will follow with you, D/w primary team    Andreina Hubbard PA-C  TPN team, pager [phone]  D/w Ana M Chacko & MU Siegel

## 2020-02-05 LAB
HCT VFR BLD CALC: 24.3 % — LOW (ref 39–50)
HGB BLD-MCNC: 8 G/DL — LOW (ref 13–17)
MCHC RBC-ENTMCNC: 30.2 PG — SIGNIFICANT CHANGE UP (ref 27–34)
MCHC RBC-ENTMCNC: 32.9 GM/DL — SIGNIFICANT CHANGE UP (ref 32–36)
MCV RBC AUTO: 91.7 FL — SIGNIFICANT CHANGE UP (ref 80–100)
NRBC # BLD: 0 /100 WBCS — SIGNIFICANT CHANGE UP (ref 0–0)
PLATELET # BLD AUTO: 296 K/UL — SIGNIFICANT CHANGE UP (ref 150–400)
RBC # BLD: 2.65 M/UL — LOW (ref 4.2–5.8)
RBC # FLD: 14 % — SIGNIFICANT CHANGE UP (ref 10.3–14.5)
WBC # BLD: 13.52 K/UL — HIGH (ref 3.8–10.5)
WBC # FLD AUTO: 13.52 K/UL — HIGH (ref 3.8–10.5)

## 2020-02-05 PROCEDURE — 99233 SBSQ HOSP IP/OBS HIGH 50: CPT

## 2020-02-05 PROCEDURE — 93970 EXTREMITY STUDY: CPT | Mod: 26

## 2020-02-05 PROCEDURE — 99232 SBSQ HOSP IP/OBS MODERATE 35: CPT

## 2020-02-05 PROCEDURE — 99222 1ST HOSP IP/OBS MODERATE 55: CPT

## 2020-02-05 RX ORDER — ACETAMINOPHEN 500 MG
1000 TABLET ORAL EVERY 6 HOURS
Refills: 0 | Status: COMPLETED | OUTPATIENT
Start: 2020-02-05 | End: 2020-02-06

## 2020-02-05 RX ORDER — ELECTROLYTE SOLUTION,INJ
1 VIAL (ML) INTRAVENOUS
Refills: 0 | Status: DISCONTINUED | OUTPATIENT
Start: 2020-02-05 | End: 2020-02-05

## 2020-02-05 RX ORDER — DOXAZOSIN MESYLATE 4 MG
2 TABLET ORAL AT BEDTIME
Refills: 0 | Status: DISCONTINUED | OUTPATIENT
Start: 2020-02-05 | End: 2020-02-06

## 2020-02-05 RX ORDER — I.V. FAT EMULSION 20 G/100ML
20.8 EMULSION INTRAVENOUS
Qty: 50 | Refills: 0 | Status: DISCONTINUED | OUTPATIENT
Start: 2020-02-05 | End: 2020-02-06

## 2020-02-05 RX ADMIN — Medication 3 MILLILITER(S): at 11:28

## 2020-02-05 RX ADMIN — Medication 1 APPLICATION(S): at 05:03

## 2020-02-05 RX ADMIN — Medication 1000 MILLIGRAM(S): at 06:04

## 2020-02-05 RX ADMIN — Medication 400 MILLIGRAM(S): at 05:03

## 2020-02-05 RX ADMIN — Medication 3 MILLILITER(S): at 05:24

## 2020-02-05 RX ADMIN — Medication 1000 MILLIGRAM(S): at 18:02

## 2020-02-05 RX ADMIN — CHLORHEXIDINE GLUCONATE 1 APPLICATION(S): 213 SOLUTION TOPICAL at 05:04

## 2020-02-05 RX ADMIN — Medication 0.5 MILLIGRAM(S): at 17:12

## 2020-02-05 RX ADMIN — Medication 400 MILLIGRAM(S): at 17:32

## 2020-02-05 RX ADMIN — HYDROMORPHONE HYDROCHLORIDE 0.5 MILLIGRAM(S): 2 INJECTION INTRAMUSCULAR; INTRAVENOUS; SUBCUTANEOUS at 14:48

## 2020-02-05 RX ADMIN — Medication 1 EACH: at 17:19

## 2020-02-05 RX ADMIN — Medication 1000 MILLIGRAM(S): at 11:30

## 2020-02-05 RX ADMIN — OCTREOTIDE ACETATE 100 MICROGRAM(S): 200 INJECTION, SOLUTION INTRAVENOUS; SUBCUTANEOUS at 05:03

## 2020-02-05 RX ADMIN — ENOXAPARIN SODIUM 40 MILLIGRAM(S): 100 INJECTION SUBCUTANEOUS at 12:25

## 2020-02-05 RX ADMIN — PANTOPRAZOLE SODIUM 40 MILLIGRAM(S): 20 TABLET, DELAYED RELEASE ORAL at 12:25

## 2020-02-05 RX ADMIN — Medication 400 MILLIGRAM(S): at 23:06

## 2020-02-05 RX ADMIN — HYDROMORPHONE HYDROCHLORIDE 0.5 MILLIGRAM(S): 2 INJECTION INTRAMUSCULAR; INTRAVENOUS; SUBCUTANEOUS at 07:35

## 2020-02-05 RX ADMIN — Medication 400 MILLIGRAM(S): at 11:00

## 2020-02-05 RX ADMIN — Medication 1 APPLICATION(S): at 17:23

## 2020-02-05 RX ADMIN — Medication 0.5 MILLIGRAM(S): at 05:24

## 2020-02-05 RX ADMIN — HYDROMORPHONE HYDROCHLORIDE 0.5 MILLIGRAM(S): 2 INJECTION INTRAMUSCULAR; INTRAVENOUS; SUBCUTANEOUS at 14:33

## 2020-02-05 RX ADMIN — Medication 3 MILLILITER(S): at 17:12

## 2020-02-05 RX ADMIN — Medication 2 MILLIGRAM(S): at 23:18

## 2020-02-05 RX ADMIN — I.V. FAT EMULSION 20.8 ML/HR: 20 EMULSION INTRAVENOUS at 17:19

## 2020-02-05 RX ADMIN — HYDROMORPHONE HYDROCHLORIDE 0.5 MILLIGRAM(S): 2 INJECTION INTRAMUSCULAR; INTRAVENOUS; SUBCUTANEOUS at 07:19

## 2020-02-05 NOTE — CONSULT NOTE ADULT - ASSESSMENT
1) Irritant contact dermatitis 2/2 sweat  -Long hospitalization/stay in bed can contribute to this condition  -Stop benadryl cream  - C/w triamcinolone 0.1% ointment BID to affected areas.  - Dermatology to sign off on this patient. Please re-consult for further questions or concerns.

## 2020-02-05 NOTE — PROGRESS NOTE ADULT - ASSESSMENT
A/P: 75 year old male w/ PMH of COPD and EtOH dependence with PSH/o appy, prostate surgery, & spine surgery  septic shock and high grade SBO s/p exploratory laparotomy, lysis of adhesions, decompression of bowel via enterotomy w/ primary repair, and Abthera VAC placement on 12/6; s/p take down of Abthera, washout and re-application of the Abthera vac on 12/8. Now s/p SBR and end ileostomy on 12/10.   TPN consulted to assist w/ management of pt's nutrition in pt w/ prolonged hospital course now tolerating diet but has high Ileostomy output.  Pt s/p Lt Chest Pigtail for effusion in IR with persistent high output on 1/10/2020 pt had VATs & placement of Left PleurX catheter. Pt op pt developed hemothorax and Respiratory Distress.  Pt is s/p Lt chest Evacuation of 2L blood w/ placement of CT x2 on 1/15/2020 for Lt Pleural Effusion that's resolving and doing well after CT removed and resolved Rt PNA vs Pleural Effusion.   POD#1 s/p Ex Lap, MIRYAM, & Closure of End Ileostomy    Pt w/ improving severe Protein-Calorie Malnutrition-       Will continue to monitor for improvement of Short Gut Syndrome w/Malabsorption- post op  TPN at GOAL:    Amino Acids 120g, Dextrose 210g, and Lipids 50g in 2000mL with 3mL MTE & 10mL MVI                     -Concern for adrenal insufficiency - am Cortisol &  ACTH Stim test reviewed        Hemodynamically stable- continue close monitoring as per SICU  -HypoCa- improving w/ increased Ca in TPN to 14mEq- continue to monitor        Improved Ca levels helpful for BP & muscle contraction  -HypoMg- improving w/ increased Mg to 14mEq  -HypoPhos- improving w/-increased NaPhos & will continue to monitor   -Fecal fat testing suggestive of decreased absorption of fat -         TPN w/ MVI to compensate for low Vit E & A        Vit D levels low- being supplemented w/Ergocalciferol         Vit K INR/ PT near normal suggestive that it is being absorbed   -Edema greatly improved and Fluid overload resolved -continue to monitor          tolerating increased TPN volume & osm  -Strict Intake and Output -           Urine Output- 1450mL- will continue to monitor          Stopped octreotide, lomotil, tincture of opium, & imodium while awaiting return of GI function  -Good glycemic control-  Fingersticks & ISS coverage - as per SICU  -Weights three times a week  -Daily CMP, Mg, Ionized Ca, Phosphorus,        and Weekly Triglycerides and Pre-albumin  Continue as per SICU/Surgery, will follow with you, D/w primary team    Andreina Hubbard PA-C  TPN team, pager [phone]  D/w Ana M Chacko & MU Siegel A/P: 75 year old male w/ PMH of COPD and EtOH dependence with PSH/o appy, prostate surgery, & spine surgery  septic shock and high grade SBO s/p exploratory laparotomy, lysis of adhesions, decompression of bowel via enterotomy w/ primary repair, and Abthera VAC placement on 12/6; s/p take down of Abthera, washout and re-application of the Abthera vac on 12/8. Now s/p SBR and end ileostomy on 12/10.   TPN consulted to assist w/ management of pt's nutrition in pt w/ prolonged hospital course now tolerating diet but has high Ileostomy output.  Pt s/p Lt Chest Pigtail for effusion in IR with persistent high output on 1/10/2020 pt had VATs & placement of Left PleurX catheter. Pt op pt developed hemothorax and Respiratory Distress.  Pt is s/p Lt chest Evacuation of 2L blood w/ placement of CT x2 on 1/15/2020 for Lt Pleural Effusion that's resolving and doing well after CT removed and resolved Rt PNA vs Pleural Effusion.   POD#1 s/p Ex Lap, MIRYAM, & Closure of End Ileostomy    Pt w/ improving severe Protein-Calorie Malnutrition-       Will continue to monitor for improvement of Short Gut Syndrome w/Malabsorption- post op  TPN at GOAL:    Amino Acids 120g, Dextrose 210g, and Lipids 50g in 2000mL with 3mL MTE & 10mL MVI                     -Concern for adrenal insufficiency - am Cortisol &  ACTH Stim test reviewed        Hemodynamically stable- continue close monitoring as per SICU  -HypoCa- improving w/ increased Ca in TPN to 14mEq- continue to monitor        Improved Ca levels helpful for BP & muscle contraction  -HypoMg- improving w/ increased Mg to 14mEq  -HypoPhos- improving w/-increased NaPhos & will continue to monitor   -Fecal fat testing suggestive of decreased absorption of fat -         TPN w/ MVI to compensate for low Vit E & A        Vit D levels low- being supplemented w/Ergocalciferol         Vit K INR/ PT near normal suggestive that it is being absorbed   -Edema resolved and Fluid overload resolved -continue to monitor          tolerating increased TPN volume & osm  -Strict Intake and Output -           Urine Output- 1450mL- will continue to monitor          Stopped octreotide, lomotil, tincture of opium, & imodium while awaiting return of GI function  -Good glycemic control-  Fingersticks & ISS coverage - as per SICU  -Weights three times a week  -Daily CMP, Mg, Ionized Ca, Phosphorus,        and Weekly Triglycerides and Pre-albumin  Continue as per SICU/Surgery, will follow with you, D/w primary team    Andreina Hubbard PA-C  TPN team, pager [phone]  D/w Ana M Chacko & MU Siegel

## 2020-02-05 NOTE — PROGRESS NOTE ADULT - PROBLEM SELECTOR PLAN 1
s/p ex lap, MIRYAM, closure of enterotomy, abthera vac placement  s/p washout  s/p Ileostomy reversal   NGT in place   pain control     s/p PICC line placement . On TPN

## 2020-02-05 NOTE — PROGRESS NOTE ADULT - ASSESSMENT
75 y/o male presenting with high grade SBO s/p exploratory laparotomy, lysis of adhesions, decompression of bowel via enterotomy w/ primary repair, & Abthera VAC placement (12/06/2019); RTOR for re-exploration w/ Abthera VAC replacement (12/08/2019) to allow for demarcation of ischemic small bowel; RTOR for re-exploration, small bowel resection of 150 cm (150 cm remaining), ileocecetomy, end ileostomy, mucous fistula, and abdominal closure (12/10/2019); hospital course complicated by SVT, short bowel syndrome requiring repletions w/ IV fluids, malnutrition requiring TPN, intermittent episodes of hypotension, spontaneous left pneumothorax s/p pigtail catheter (12/15/2019-12/24/2019), left pleural effusion s/p pigtail catheter w/ IR (12/30/2019), dysphagia, and oral HSV lesions, placement of Pleurx on 1/10, now re-admitted to SICU with acute hypoxic respiratory failure s/p repeat VATS with 2 liter hemothorax removed. Respiratory failure resolved, now stable in SICU.    Neuro: no acute issues  - Pain control as needed with acetaminophen and dilauded Pro Re Samantha.     Resp: COPD, spontaneous left pneumothorax s/p pigtail catheter, left pleural effusion s/p Pleurx 1/10.  Intubated for respiratory distress s/p VATS 01/15 with 2 Liter of blood evacuation s/p removal of chest tubes   - Pulmicort and Duoneb for COPD  - Will need outpatient follow-up with a pulmonologist for his COPD as patient does not currently have one  -F/u repeat AM CXR    CV: no acute issues  - Monitor hemodynamics, I&Os    GI: s/p exploratory laparotomy, Grecia, decompression of bowel via enterotomy w/ primary repair, & Abthera VAC placement (12/06/2019); re-exploration w/ ABthera VAC replacement (12/08/2019); re-exploration, small bowel resection of 150 cm (150 cm remaining), ileocecectomy end ileostomy, mucous fistula, and abdominal closure (12/10/2019); short bowel syndrome, malnutrition, dysphagia  - Monitor ostomy output 1.5L past 24hrs  - Continue Lomotil, tincture of opium, loperamide, metamucil, and octreotide for short bowel syndrome  - Protonix to decrease gastric secretions  - Continue Vitamin D supplementation  -NPO + TPN    Renal: polyuria 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     Heme: anemia likely secondary to malnutrition / malabsorption  - Monitor CBC and coags   - Lovenox for VTE prophylaxis  -AM T&S prior to ileostomy reversal     ID: oral HSV lesions (s/p acyclovir 12/23/2019-01/02/2020); completed course of meropenem for Zosyn-resistant Pseudomonas PNA   - Monitor for fever, culture if febrile  -Appreciate Derm recs w/ triamcinolone 0.1% cream     Endo: possible adrenal insufficiency  - Monitor glucose on BMPs   - Cosyntropin test indicative of adrenal insufficiency    Dispo: Surgical Intensive Care Unit. 75 y/o male presenting with high grade SBO s/p exploratory laparotomy, lysis of adhesions, decompression of bowel via enterotomy w/ primary repair, & Abthera VAC placement (12/06/2019); RTOR for re-exploration w/ Abthera VAC replacement (12/08/2019) to allow for demarcation of ischemic small bowel; RTOR for re-exploration, small bowel resection of 150 cm (150 cm remaining), ileocecetomy, end ileostomy, mucous fistula, and abdominal closure (12/10/2019); hospital course complicated by SVT, short bowel syndrome requiring repletions w/ IV fluids, malnutrition requiring TPN, intermittent episodes of hypotension, spontaneous left pneumothorax s/p pigtail catheter (12/15/2019-12/24/2019), left pleural effusion s/p pigtail catheter w/ IR (12/30/2019), dysphagia, and oral HSV lesions, placement of Pleurx on 1/10, now re-admitted to SICU with acute hypoxic respiratory failure s/p repeat VATS with 2 liter hemothorax removed. Respiratory failure resolved, now stable in SICU.    Neuro: no acute issues  - Pain control as needed with acetaminophen and dilauded Pro Re Samantha.     Resp: COPD, spontaneous left pneumothorax s/p pigtail catheter, left pleural effusion s/p Pleurx 1/10.  Intubated for respiratory distress s/p VATS 01/15 with 2 Liter of blood evacuation s/p removal of chest tubes   - Pulmicort and Duoneb for COPD  - Will need outpatient follow-up with a pulmonologist for his COPD as patient does not currently have one  -F/u repeat AM CXR    CV: no acute issues  - Monitor hemodynamics, I&Os    GI: s/p exploratory laparotomy, Grecia, decompression of bowel via enterotomy w/ primary repair, & Abthera VAC placement (12/06/2019); re-exploration w/ ABthera VAC replacement (12/08/2019); re-exploration, small bowel resection of 150 cm (150 cm remaining), ileocecectomy end ileostomy, mucous fistula, and abdominal closure (12/10/2019); short bowel syndrome, malnutrition, dysphagia  - To continue Nasogastric Tube, Nil Per Os, Intravenous Fluids.   - Likely discontinue octreotide injections in AM, will discuss with primary team.   - Continue TPN.     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  - Likely d/c rosas in AM.   - Flomax for urinary retention  - DDAVP dosed based on UO.     Heme: anemia likely secondary to malnutrition / malabsorption  - Monitor CBC and coags   - Lovenox for VTE prophylaxis    ID: oral HSV lesions (s/p acyclovir 12/23/2019-01/02/2020); completed course of meropenem for Zosyn-resistant Pseudomonas PNA   - Monitor for fever, culture if febrile  - Appreciate Derm recs w/ triamcinolone 0.1% cream     Endo: possible adrenal insufficiency  - Monitor glucose on BMPs   - Cosyntropin test indicative of adrenal insufficiency.     Dispo: Surgical Intensive Care Unit.

## 2020-02-05 NOTE — PROGRESS NOTE ADULT - ATTENDING COMMENTS
Pt seen and examined. Chart reviewed. Resident note confirmed. Pt is a 75 year old male with a  medical history signficant for CAD/COPD/SIADH who presented to [facility] with abdominal pain. He underwent right colectomy for diverticulitis and second look laparotomy for small intestinal ischemia. Pt underwent subsequent small intestinal resection. He had prolonged resp failure and required vasopressor support. He developed a spont ptx/pleural effusion, requiring chest tube and subsequent VATS. He developed high ileostomy output and has been on maximal medical therapy. Pt is now 60 days post resection and returned to OR for reversal of ileostomy. No  events overnight.      PMH/PSH/MEDS/ALL/SH/FH/ROS:  Unchanged from H&P  Vitals/PE/Labs/radiographs:  Reviewed      A/p    Neuro:	Post op pain  	Continue pain control    CVS:	CAD  	S/p hypovolemia/hypotension  	Continue cardiac monitoring    Pulm:	COPD  	Atelectasis  	s/p ptx/effusion/VATS  	Continue ISP    GI:	S/p reversal of ileostomy  	s/p right colon rsn and ileal rsn  	Mild elevation of TB  	Malnutrition  	Continue TPN  	Await return of bowel fcn    :	s/p CHI  	Monitor I's and O's  	  Heme:	Acute blood loss anema  	Monitor H/h    ID:	S/p diverticulitis  	S/p sepsis  	Cx for fever    Endo:	Continue glycemic control.

## 2020-02-05 NOTE — CONSULT NOTE ADULT - SUBJECTIVE AND OBJECTIVE BOX
HPI:  75 y/o with PMhx of COPD and ETOH abuse presenting with abdominal pain x2 days. Reports that pain felt more like gas pain, and had similar episodes in the past. Felt nauseous and couldn't vomit. Patient forced himself to vomit had hematemesis. Unable to tolerate PO in last day. Reports unable to pass gas or have BM in 3 days. Patient reports that he does not follow up with a PMD regularly, and can't recall last time he saw doctor. Reports he had colonoscopy recently with no significant findings. Last drink was 3 years ago. Reports some difficulty breathing. Denies fevers, chills, chest pain, or urinary changes. (06 Dec 2019 18:27)      PAST MEDICAL & SURGICAL HISTORY:  COPD with hypoxia  ETOHism  ETOH abuse  History of lumbosacral spine surgery  History of prostate surgery  History of appendectomy      Review of Systems:  Constitutional: denies fevers, chills  HEENT: odynophagia or dysphagia  Cardiovascular: denies palpitations  Respiratory: denies SOB, wheezing  Gastrointestinal: denies N/V/D, abdominal pain  : denies dysuria  MSK: denies weakness, joint pain  Skin: denies new rashes or masses unless otherwise specified in hpi    MEDICATIONS  (STANDING):  albuterol/ipratropium for Nebulization. 3 milliLiter(s) Nebulizer every 6 hours  buDESOnide    Inhalation Suspension 0.5 milliGRAM(s) Inhalation every 12 hours  chlorhexidine 2% Cloths 1 Application(s) Topical <User Schedule>  doxazosin 2 milliGRAM(s) Oral at bedtime  enoxaparin Injectable 40 milliGRAM(s) SubCutaneous daily  ergocalciferol 03160 Unit(s) Oral every week  fat emulsion (Fish Oil and Plant Based) 20% Infusion 20.8 mL/Hr (20.8 mL/Hr) IV Continuous <Continuous>  nystatin/triamcinolone Cream 1 Application(s) Topical two times a day  pantoprazole  Injectable 40 milliGRAM(s) IV Push daily  Parenteral Nutrition - Adult 1 Each (83 mL/Hr) TPN Continuous <Continuous>  Parenteral Nutrition - Adult 1 Each (83 mL/Hr) TPN Continuous <Continuous>  triamcinolone 0.1% Ointment 1 Application(s) Topical two times a day    MEDICATIONS  (PRN):  benzocaine 15 mG/menthol 3.6 mG (Sugar-Free) Lozenge 1 Lozenge Oral every 4 hours PRN Sore Throat  HYDROmorphone  Injectable 0.5 milliGRAM(s) IV Push every 3 hours PRN Severe Pain (7 - 10)      Allergies    IV Contrast (Hives)    Intolerances        SOCIAL HISTORY: denies drug use    FAMILY HISTORY:      Vital Signs Last 24 Hrs  T(C): 37.2 (05 Feb 2020 15:00), Max: 37.3 (05 Feb 2020 03:00)  T(F): 99 (05 Feb 2020 15:00), Max: 99.1 (05 Feb 2020 03:00)  HR: 86 (05 Feb 2020 16:00) (81 - 96)  BP: 119/57 (05 Feb 2020 16:00) (95/53 - 159/77)  BP(mean): 83 (05 Feb 2020 16:00) (69 - 110)  RR: 21 (05 Feb 2020 16:00) (14 - 24)  SpO2: 96% (05 Feb 2020 16:00) (96% - 100%)    Physical Exam:  The patient was alert and oriented X 3, well nourished, and in no apparent distress. Oropharynx showed no ulcerations. There was no visible lymphadenopathy. Conjunctiva were non-injected. There was no clubbing or edema of extremities.    The scalp, hair, face, eyebrows, lips, oropharynx , neck, chest, back, buttocks, extremities X 4, hands, feet, nails were examined. There was no hyperhidrosis or bromhidrosis.     The following lesions are noted:     LABS:                        8.0    13.52 )-----------( 296      ( 05 Feb 2020 03:33 )             24.3     02-04    136  |  98  |  28<H>  ----------------------------<  117<H>  4.4   |  30  |  0.45<L>    Ca    8.6      04 Feb 2020 21:35  Phos  4.0     02-04  Mg     1.8     02-04    TPro  6.1  /  Alb  2.7<L>  /  TBili  1.4<H>  /  DBili  0.8<H>  /  AST  31  /  ALT  35  /  AlkPhos  97  02-04          RADIOLOGY & ADDITIONAL STUDIES: no relevant studies HPI:    75 y/o male presenting with high grade SBO s/p exploratory laparotomy, lysis of adhesions, decompression of bowel via enterotomy w/ primary repair, & Abthera VAC placement (12/06/2019); RTOR for re-exploration w/ Abthera VAC replacement (12/08/2019) to allow for demarcation of ischemic small bowel; RTOR for re-exploration, small bowel resection of 150 cm (150 cm remaining), ileocecetomy, end ileostomy, mucous fistula, and abdominal closure (12/10/2019); hospital course complicated by SVT, short bowel syndrome requiring repletions w/ IV fluids, malnutrition requiring TPN, intermittent episodes of hypotension, spontaneous left pneumothorax s/p pigtail catheter (12/15/2019-12/24/2019), left pleural effusion s/p pigtail catheter w/ IR (12/30/2019), dysphagia, and oral HSV lesions, placement of Pleurx on 1/10, now re-admitted to SICU with acute hypoxic respiratory failure s/p repeat VATS with 2 liter hemothorax removed. Respiratory failure resolved, now stable in SICU.    Dermatology is consulted for rash on back x several weeks. Itchy. Team has been applying Benadryl cream without much improvement.     PAST MEDICAL & SURGICAL HISTORY:  COPD with hypoxia  ETOHism  ETOH abuse  History of lumbosacral spine surgery  History of prostate surgery  History of appendectomy      Review of Systems:  Constitutional: denies fevers, chills  HEENT: odynophagia or dysphagia  Cardiovascular: denies palpitations  Respiratory: denies SOB, wheezing  Gastrointestinal: denies N/V/D, abdominal pain  : denies dysuria  MSK: denies weakness, joint pain  Skin: denies new rashes or masses unless otherwise specified in hpi    MEDICATIONS  (STANDING):  albuterol/ipratropium for Nebulization. 3 milliLiter(s) Nebulizer every 6 hours  buDESOnide    Inhalation Suspension 0.5 milliGRAM(s) Inhalation every 12 hours  chlorhexidine 2% Cloths 1 Application(s) Topical <User Schedule>  doxazosin 2 milliGRAM(s) Oral at bedtime  enoxaparin Injectable 40 milliGRAM(s) SubCutaneous daily  ergocalciferol 25462 Unit(s) Oral every week  fat emulsion (Fish Oil and Plant Based) 20% Infusion 20.8 mL/Hr (20.8 mL/Hr) IV Continuous <Continuous>  nystatin/triamcinolone Cream 1 Application(s) Topical two times a day  pantoprazole  Injectable 40 milliGRAM(s) IV Push daily  Parenteral Nutrition - Adult 1 Each (83 mL/Hr) TPN Continuous <Continuous>  Parenteral Nutrition - Adult 1 Each (83 mL/Hr) TPN Continuous <Continuous>  triamcinolone 0.1% Ointment 1 Application(s) Topical two times a day    MEDICATIONS  (PRN):  benzocaine 15 mG/menthol 3.6 mG (Sugar-Free) Lozenge 1 Lozenge Oral every 4 hours PRN Sore Throat  HYDROmorphone  Injectable 0.5 milliGRAM(s) IV Push every 3 hours PRN Severe Pain (7 - 10)      Allergies    IV Contrast (Hives)    Intolerances        SOCIAL HISTORY: denies drug use    FAMILY HISTORY:      Vital Signs Last 24 Hrs  T(C): 37.2 (05 Feb 2020 15:00), Max: 37.3 (05 Feb 2020 03:00)  T(F): 99 (05 Feb 2020 15:00), Max: 99.1 (05 Feb 2020 03:00)  HR: 86 (05 Feb 2020 16:00) (81 - 96)  BP: 119/57 (05 Feb 2020 16:00) (95/53 - 159/77)  BP(mean): 83 (05 Feb 2020 16:00) (69 - 110)  RR: 21 (05 Feb 2020 16:00) (14 - 24)  SpO2: 96% (05 Feb 2020 16:00) (96% - 100%)    Physical Exam:  The patient was alert and oriented X 3, well nourished, and in no apparent distress. Oropharynx showed no ulcerations. There was no visible lymphadenopathy. Conjunctiva were non-injected. There was no clubbing or edema of extremities.    The scalp, hair, face, eyebrows, lips, oropharynx , neck, chest, back, buttocks, extremities X 4, hands, feet, nails were examined. There was no hyperhidrosis or bromhidrosis.     The following lesions are noted:     PE: Red eczematous papules and plaques on back, few on chest    LABS:                        8.0    13.52 )-----------( 296      ( 05 Feb 2020 03:33 )             24.3     02-04    136  |  98  |  28<H>  ----------------------------<  117<H>  4.4   |  30  |  0.45<L>    Ca    8.6      04 Feb 2020 21:35  Phos  4.0     02-04  Mg     1.8     02-04    TPro  6.1  /  Alb  2.7<L>  /  TBili  1.4<H>  /  DBili  0.8<H>  /  AST  31  /  ALT  35  /  AlkPhos  97  02-04          RADIOLOGY & ADDITIONAL STUDIES: no relevant studies

## 2020-02-05 NOTE — PROGRESS NOTE ADULT - SUBJECTIVE AND OBJECTIVE BOX
GENERAL SURGERY PROGRESS NOTE    SUBJECTIVE/24 HOUR EVENTS:   - Taken to the Operating Theatre, where he underwent ileostomy reversal.   - Hemoglobin and hematocrit downtrending   - Patient seen and examined at bedside  - Patient feels well, tolerating diet, on TPN, oob/ambi, pain controlled, denies f/c/n/v/d/sob  - NGT in place    OBJECTIVE  PHYSICAL EXAM:  General: Appears well, NAD  Neuro: AAOx3  CHEST: Clear to auscultation bilaterally,  CV: Regular rate and rhythm  Abdomen: soft, nontender, nondistended, no rebound or guarding, dressing c/d/i  Extremities: Grossly symmetric    V/S:  Vital Signs Last 24 Hrs  T(C): 37.1 (05 Feb 2020 07:00), Max: 37.3 (05 Feb 2020 03:00)  T(F): 98.8 (05 Feb 2020 07:00), Max: 99.1 (05 Feb 2020 03:00)  HR: 87 (05 Feb 2020 07:00) (83 - 96)  BP: 107/54 (05 Feb 2020 07:00) (95/53 - 178/72)  BP(mean): 77 (05 Feb 2020 07:00) (69 - 110)  RR: 17 (05 Feb 2020 07:00) (16 - 23)  SpO2: 98% (05 Feb 2020 07:00) (96% - 100%)    --------------------------------------------------------------------------------------------------  I/Os:    04 Feb 2020 07:01  -  05 Feb 2020 07:00  --------------------------------------------------------  IN:    fat emulsion (Fish Oil and Plant Based) 20% Infusion: 270.4 mL    lactated ringers.: 750 mL    Solution: 50 mL    Solution: 300 mL    TPN (Total Parenteral Nutrition): 1992 mL  Total IN: 3362.4 mL    OUT:    Ileostomy: 60 mL    Indwelling Catheter - Urethral: 1450 mL    Nasoenteral Tube: 200 mL    Voided: 100 mL  Total OUT: 1810 mL    Total NET: 1552.4 mL      05 Feb 2020 07:01  -  05 Feb 2020 07:59  --------------------------------------------------------  IN:    TPN (Total Parenteral Nutrition): 83 mL  Total IN: 83 mL    OUT:  Total OUT: 0 mL    Total NET: 83 mL        --------------------------------------------------------------------------------------------------  LABS:                        8.0    13.52 )-----------( 296      ( 05 Feb 2020 03:33 )             24.3     04 Feb 2020 21:35    136    |  98     |  28     ----------------------------<  117    4.4     |  30     |  0.45     Ca    8.6        04 Feb 2020 21:35  Phos  4.0       04 Feb 2020 21:35  Mg     1.8       04 Feb 2020 21:35    TPro  6.1    /  Alb  2.7    /  TBili  1.4    /  DBili  0.8    /  AST  31     /  ALT  35     /  AlkPhos  97     04 Feb 2020 21:35    PT/INR - ( 03 Feb 2020 13:58 )   PT: 13.0 sec;   INR: 1.14 ratio         PTT - ( 03 Feb 2020 13:58 )  PTT:43.1 sec  CAPILLARY BLOOD GLUCOSE            LIVER FUNCTIONS - ( 04 Feb 2020 21:35 )  Alb: 2.7 g/dL / Pro: 6.1 g/dL / ALK PHOS: 97 U/L / ALT: 35 U/L / AST: 31 U/L / GGT: x               --------------------------------------------------------------------------------------------------  MEDICATIONS  (STANDING):  acetaminophen  IVPB .. 1000 milliGRAM(s) IV Intermittent every 6 hours  albuterol/ipratropium for Nebulization. 3 milliLiter(s) Nebulizer every 6 hours  buDESOnide    Inhalation Suspension 0.5 milliGRAM(s) Inhalation every 12 hours  chlorhexidine 2% Cloths 1 Application(s) Topical <User Schedule>  enoxaparin Injectable 40 milliGRAM(s) SubCutaneous daily  ergocalciferol 23517 Unit(s) Oral every week  fat emulsion (Fish Oil and Plant Based) 20% Infusion 20.8 mL/Hr (20.8 mL/Hr) IV Continuous <Continuous>  nystatin/triamcinolone Cream 1 Application(s) Topical two times a day  octreotide  Injectable 100 MICROGram(s) SubCutaneous three times a day  pantoprazole  Injectable 40 milliGRAM(s) IV Push daily  Parenteral Nutrition - Adult 1 Each (83 mL/Hr) TPN Continuous <Continuous>  tamsulosin 0.4 milliGRAM(s) Oral daily  triamcinolone 0.1% Ointment 1 Application(s) Topical two times a day    MEDICATIONS  (PRN):  benzocaine 15 mG/menthol 3.6 mG (Sugar-Free) Lozenge 1 Lozenge Oral every 4 hours PRN Sore Throat  HYDROmorphone  Injectable 0.5 milliGRAM(s) IV Push every 3 hours PRN Severe Pain (7 - 10)  ondansetron Injectable 4 milliGRAM(s) IV Push every 6 hours PRN Nausea and/or Vomiting GENERAL SURGERY PROGRESS NOTE    SUBJECTIVE/24 HOUR EVENTS:   - Taken to the Operating Theatre, where he underwent ileostomy reversal.   - Hemoglobin and hematocrit downtrending   - Patient seen and examined at bedside  - Patient feels well, on TPN, oob/ambi, pain controlled, denies f/c/n/v/d/sob  - NGT in place    OBJECTIVE  PHYSICAL EXAM:  General: Appears well, NAD  Neuro: AAOx3  CHEST: Clear to auscultation bilaterally,  CV: Regular rate and rhythm  Abdomen: soft, nontender, nondistended, no rebound or guarding, dressing c/d/i  Extremities: Grossly symmetric    V/S:  Vital Signs Last 24 Hrs  T(C): 37.1 (05 Feb 2020 07:00), Max: 37.3 (05 Feb 2020 03:00)  T(F): 98.8 (05 Feb 2020 07:00), Max: 99.1 (05 Feb 2020 03:00)  HR: 87 (05 Feb 2020 07:00) (83 - 96)  BP: 107/54 (05 Feb 2020 07:00) (95/53 - 178/72)  BP(mean): 77 (05 Feb 2020 07:00) (69 - 110)  RR: 17 (05 Feb 2020 07:00) (16 - 23)  SpO2: 98% (05 Feb 2020 07:00) (96% - 100%)    --------------------------------------------------------------------------------------------------  I/Os:    04 Feb 2020 07:01  -  05 Feb 2020 07:00  --------------------------------------------------------  IN:    fat emulsion (Fish Oil and Plant Based) 20% Infusion: 270.4 mL    lactated ringers.: 750 mL    Solution: 50 mL    Solution: 300 mL    TPN (Total Parenteral Nutrition): 1992 mL  Total IN: 3362.4 mL    OUT:    Ileostomy: 60 mL    Indwelling Catheter - Urethral: 1450 mL    Nasoenteral Tube: 200 mL    Voided: 100 mL  Total OUT: 1810 mL    Total NET: 1552.4 mL      05 Feb 2020 07:01  -  05 Feb 2020 07:59  --------------------------------------------------------  IN:    TPN (Total Parenteral Nutrition): 83 mL  Total IN: 83 mL    OUT:  Total OUT: 0 mL    Total NET: 83 mL        --------------------------------------------------------------------------------------------------  LABS:                        8.0    13.52 )-----------( 296      ( 05 Feb 2020 03:33 )             24.3     04 Feb 2020 21:35    136    |  98     |  28     ----------------------------<  117    4.4     |  30     |  0.45     Ca    8.6        04 Feb 2020 21:35  Phos  4.0       04 Feb 2020 21:35  Mg     1.8       04 Feb 2020 21:35    TPro  6.1    /  Alb  2.7    /  TBili  1.4    /  DBili  0.8    /  AST  31     /  ALT  35     /  AlkPhos  97     04 Feb 2020 21:35    PT/INR - ( 03 Feb 2020 13:58 )   PT: 13.0 sec;   INR: 1.14 ratio         PTT - ( 03 Feb 2020 13:58 )  PTT:43.1 sec  CAPILLARY BLOOD GLUCOSE            LIVER FUNCTIONS - ( 04 Feb 2020 21:35 )  Alb: 2.7 g/dL / Pro: 6.1 g/dL / ALK PHOS: 97 U/L / ALT: 35 U/L / AST: 31 U/L / GGT: x               --------------------------------------------------------------------------------------------------  MEDICATIONS  (STANDING):  acetaminophen  IVPB .. 1000 milliGRAM(s) IV Intermittent every 6 hours  albuterol/ipratropium for Nebulization. 3 milliLiter(s) Nebulizer every 6 hours  buDESOnide    Inhalation Suspension 0.5 milliGRAM(s) Inhalation every 12 hours  chlorhexidine 2% Cloths 1 Application(s) Topical <User Schedule>  enoxaparin Injectable 40 milliGRAM(s) SubCutaneous daily  ergocalciferol 38936 Unit(s) Oral every week  fat emulsion (Fish Oil and Plant Based) 20% Infusion 20.8 mL/Hr (20.8 mL/Hr) IV Continuous <Continuous>  nystatin/triamcinolone Cream 1 Application(s) Topical two times a day  octreotide  Injectable 100 MICROGram(s) SubCutaneous three times a day  pantoprazole  Injectable 40 milliGRAM(s) IV Push daily  Parenteral Nutrition - Adult 1 Each (83 mL/Hr) TPN Continuous <Continuous>  tamsulosin 0.4 milliGRAM(s) Oral daily  triamcinolone 0.1% Ointment 1 Application(s) Topical two times a day    MEDICATIONS  (PRN):  benzocaine 15 mG/menthol 3.6 mG (Sugar-Free) Lozenge 1 Lozenge Oral every 4 hours PRN Sore Throat  HYDROmorphone  Injectable 0.5 milliGRAM(s) IV Push every 3 hours PRN Severe Pain (7 - 10)  ondansetron Injectable 4 milliGRAM(s) IV Push every 6 hours PRN Nausea and/or Vomiting

## 2020-02-05 NOTE — PROGRESS NOTE ADULT - ASSESSMENT
74 y/o M presenting with septic shock and high grade SBO s/p exploratory laparotomy, lysis of adhesions, decompression of bowel via enterotomy w/ primary repair, and Abthera VAC placement on 12/6; s/p take down of Abthera, washout and re-application of the Abthera vac on 12/8. 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 s/p L chest tube placement by IR 12/30 for pleural effusion now s/p VATS, with chest tube insertion for drainage of pleural effusion. RRT called 1/13 AM for hypoxia, patient transferred back to SICU.  Patient continued SOB, chest drain possible obstruction expanding. Patent taken back to OR emergently 1/15 for clot evac and VATS. Patient is now s/p Ileostomy reversal 2/4/20.    PLAN:  - JANELLE  - RICHARD LCWS  - OOB to chair  - Appreciate continued SICU care    [facility]  [id number] 74 y/o M presenting with septic shock and high grade SBO s/p exploratory laparotomy, lysis of adhesions, decompression of bowel via enterotomy w/ primary repair, and Abthera VAC placement on 12/6; s/p take down of Abthera, washout and re-application of the Abthera vac on 12/8. 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 s/p L chest tube placement by IR 12/30 for pleural effusion now s/p VATS, with chest tube insertion for drainage of pleural effusion. RRT called 1/13 AM for hypoxia, patient transferred back to SICU.  Patient continued SOB, chest drain possible obstruction expanding. Patent taken back to OR emergently 1/15 for clot evac and VATS. Patient is now s/p Ileostomy reversal 2/4/20.    PLAN:  - f/u labs  - cont npo, ivf, tpn, NGT LCWS  - OOB to chair  - await return of GI fxn   - Appreciate continued SICU care    [facility]  [id number]

## 2020-02-05 NOTE — PROGRESS NOTE ADULT - SUBJECTIVE AND OBJECTIVE BOX
HISTORY  75y Male    24 HOUR EVENTS:  -Ileostomy reversal performed     SUBJECTIVE/ROS:  [x] A ten-point review of systems was otherwise negative except as noted.  [ ] Due to altered mental status/intubation, subjective information were not able to be obtained from the patient. History was obtained, to the extent possible, from review of the chart and collateral sources of information.      NEURO  Exam: awake, alert, oriented  Meds: acetaminophen  IVPB .. 1000 milliGRAM(s) IV Intermittent every 6 hours  HYDROmorphone  Injectable 0.5 milliGRAM(s) IV Push every 3 hours PRN Severe Pain (7 - 10)  ondansetron Injectable 4 milliGRAM(s) IV Push every 6 hours PRN Nausea and/or Vomiting    [x] Adequacy of sedation and pain control has been assessed and adjusted      RESPIRATORY  RR: 23 (02-05-20 @ 03:00) (16 - 36)  SpO2: 96% (02-05-20 @ 03:00) (96% - 100%)  Wt(kg): --  Exam: unlabored, clear to auscultation bilaterally    Meds: albuterol/ipratropium for Nebulization. 3 milliLiter(s) Nebulizer every 6 hours  buDESOnide    Inhalation Suspension 0.5 milliGRAM(s) Inhalation every 12 hours        CARDIOVASCULAR  HR: 87 (02-05-20 @ 03:00) (71 - 96)  BP: 100/58 (02-05-20 @ 03:00) (89/54 - 178/72)  BP(mean): 76 (02-05-20 @ 03:00) (64 - 110)  ABP: --  ABP(mean): --  Wt(kg): --  CVP(cm H2O): --  VBG - ( 04 Feb 2020 14:21 )  pH: 7.32  /  pCO2: 60    /  pO2: 58    / HCO3: 30    / Base Excess: 3.8   /  SaO2: 86         Exam: regular rate and rhythm  Cardiac Rhythm: sinus  Perfusion     [x]Adequate   [ ]Inadequate  Mentation   [x]Normal       [ ]Reduced  Extremities  [x]Warm         [ ]Cool  Volume Status [ ]Hypervolemic [x]Euvolemic [ ]Hypovolemic  Meds: tamsulosin 0.4 milliGRAM(s) Oral daily      GI/NUTRITION  Exam: soft, nontender, nondistended, incision C/D/I  Diet: NPO w/ TPN   Meds: pantoprazole  Injectable 40 milliGRAM(s) IV Push daily      GENITOURINARY  I&O's Detail    02-03 @ 07:01  -  02-04 @ 07:00  --------------------------------------------------------  IN:    fat emulsion (Fish Oil and Plant Based) 20% Infusion: 270.4 mL    Oral Fluid: 970 mL    Solution: 50 mL    TPN (Total Parenteral Nutrition): 1992 mL  Total IN: 3282.4 mL    OUT:    Ileostomy: 2000 mL    Incontinent per Condom Catheter: 100 mL    Voided: 1100 mL  Total OUT: 3200 mL    Total NET: 82.4 mL      02-04 @ 07:01  -  02-05 @ 03:14  --------------------------------------------------------  IN:    fat emulsion (Fish Oil and Plant Based) 20% Infusion: 208 mL    lactated ringers.: 600 mL    Solution: 200 mL    Solution: 50 mL    TPN (Total Parenteral Nutrition): 1660 mL  Total IN: 2718 mL    OUT:    Ileostomy: 60 mL    Indwelling Catheter - Urethral: 1165 mL    Voided: 100 mL  Total OUT: 1325 mL    Total NET: 1393 mL      Weight (kg): 54 (02-04 @ 08:35)  02-04    136  |  98  |  28<H>  ----------------------------<  117<H>  4.4   |  30  |  0.45<L>    Ca    8.6      04 Feb 2020 21:35  Phos  4.0     02-04  Mg     1.8     02-04    TPro  6.1  /  Alb  2.7<L>  /  TBili  1.4<H>  /  DBili  0.8<H>  /  AST  31  /  ALT  35  /  AlkPhos  97  02-04    [ ] Martinez catheter, indication: N/A  Meds: ergocalciferol 07132 Unit(s) Oral every week  fat emulsion (Fish Oil and Plant Based) 20% Infusion 20.8 mL/Hr IV Continuous <Continuous>  lactated ringers. 1000 milliLiter(s) IV Continuous <Continuous>  Parenteral Nutrition - Adult 1 Each TPN Continuous <Continuous>        HEMATOLOGIC  Meds: enoxaparin Injectable 40 milliGRAM(s) SubCutaneous daily    [x] VTE Prophylaxis                        7.9    18.35 )-----------( 307      ( 04 Feb 2020 21:35 )             24.2     PT/INR - ( 03 Feb 2020 13:58 )   PT: 13.0 sec;   INR: 1.14 ratio         PTT - ( 03 Feb 2020 13:58 )  PTT:43.1 sec  Transfusion     [ ] PRBC   [ ] Platelets   [ ] FFP   [ ] Cryoprecipitate      INFECTIOUS DISEASES  WBC Count: 18.35 K/uL (02-04 @ 21:35)  WBC Count: 14.95 K/uL (02-04 @ 14:24)    RECENT CULTURES:    Meds:       ENDOCRINE  CAPILLARY BLOOD GLUCOSE      Meds: octreotide  Injectable 100 MICROGram(s) SubCutaneous three times a day        ACCESS DEVICES:  [ ] Peripheral IV  [ ] Central Venous Line	[ ] R	[ ] L	[ ] IJ	[ ] Fem	[ ] SC	Placed:   [ ] Arterial Line		[ ] R	[ ] L	[ ] Fem	[ ] Rad	[ ] Ax	Placed:   [ ] PICC:					[ ] Mediport  [ ] Urinary Catheter, Date Placed:   [x] Necessity of urinary, arterial, and venous catheters discussed    OTHER MEDICATIONS:  benzocaine 15 mG/menthol 3.6 mG (Sugar-Free) Lozenge 1 Lozenge Oral every 4 hours PRN  chlorhexidine 2% Cloths 1 Application(s) Topical <User Schedule>  nystatin/triamcinolone Cream 1 Application(s) Topical two times a day  triamcinolone 0.1% Ointment 1 Application(s) Topical two times a day      CODE STATUS: Full code < from: Xray Chest 1 View- PORTABLE-Urgent (02.04.20 @ 15:14) >    IMAGING:  EXAM:  XR CHEST PORTABLE URGENT 1V                            PROCEDURE DATE:  02/04/2020            INTERPRETATION:  A single chest x-ray was obtained on February 4, 2020.    Indication: NG tube placement.    Impression:    The heart is normal in size. Left lower lobe pneumonia and/or atelectasis. The right lung is clear. Endotracheal tube was removed. NG tube is in the stomach. A PICC line is seen on the right and the tip is in the superior vena cava. No pneumothorax. Calcified aortic knob.    < end of copied text > HISTORY  75y Male past medical history of COPD and EtOH dependence who presented on 12/6/2019 with abdominal pain, nausea, hematemesis, and poor PO intake for ~2-3 days. In the ED, he was found to be hypotensive & tachycardic. Labs revealed an CHI and lactic acidosis. Imaging revealed a high grade SBO in the RLQ on CT scan. Hospital course is as follows:  12/06 - s/p exploratory laparotomy, lysis of adhesions, decompression of bowel via enterotomy w/ primary repair, and Abthera VAC placement as the distal 50% of the bowel appeared dusky but was still viable, admitted to SICU post-operatively as he was on vasopressor support and was left intubated  12/08 - s/p re-exploration, proximal 165 cm of small bowel appeared pink & viable but beyond that had patchy areas of ischemia so decision was made to give the bowel more time to demarcate before resecting in order to preserve as much small bowel as possible so Abthera VAC was replaced  12/09 - s/p re-exploration, small bowel resection of 150 cm (150 cm remaining), ileocecetomy, end ileostomy, mucous fistula, and abdominal closure  12/11 - extubated to BiPAP, noted to be in SVT that was rate controlled w/ metoprolol  12/14 - started on TPN  12/15 - noted to have spontaneous left pneumothorax s/p left pigtail catheter placement, noted to be in SVT again so amiodarone started, high ileostomy output noted so concern for short bowel syndrome  12/16 - amiodarone discontinued, started on beta blocker with metoprolol  12/18 - started Lomotil and ileostomy repletions with IV fluids  12/19 - started Imodium, left pigtail catheter was placed to water seal but pneumothorax noted on follow-up CXR so placed back to suction  12/20 - started tincture of opium, concern for aspiration so changed diet from regular to dysphagia 1 pureed with nectar-thickened liquids  12/22 - left pigtail catheter placed to water seal with no pneumothorax noted on follow-up CXR, CT chest with moderate left pleural effusion not being drained by pigtail catheter  12/23 - 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  12/24 - left pigtail catheter discontinued  12/28 - started Ancef for erythematous midline wound  12/29 - beta blocker discontinued for intermittent episodes of hypotension  12/30 - left pigtail catheter placement by IR with drainage of serous transudative fluid  01/02 - FEES demonstrating penetration with thin liquids so patient kept on dysphagia 1 pureed with nectar-thickened liquids  01/07 - Transferred to floors  01/10 - s/p left VATS w/ PleurX catheter placement  01/11 - evaluated by speech & swallow, advanced diet to mechanical soft with thin liquids  01/12 - PleurX catheter placed to water seal with on pneumothorax on follow-up CXR  01/13 - RRT for hypoxemia, placed on BiPAP, PleurX catheter placed back to suction, started vancomycin & Zosyn for possible HCAP  01/14 - noted to have minimal output from PleurX catheter, lung ultrasound with large left pleural effusion concerning for hemothorax, multiple episodes of bradycardia secondary to hypoxemia, remains on BiPAP  01/15 - worsening respiratory distress requiring intubation, hypotensive requiring vasopressor support, CT chest with large left hemothorax w/ trace pneumothorax & extensive subcutaneous emphysema so taken to OR emergently for left VATS, evacuation fo 2 L of clot, and placement of two left chest tubes  01/16 - extubated, weaned off vasopressor support  01/17 - two left chest tubes placed to water seal with no pneumothorax noted on follow-up CXR  02/01- Remains ileostomy reversal planning, chest tubes discontinued.     24 HOUR EVENTS:  -Ileostomy reversal performed     SUBJECTIVE/ROS:  [x] A ten-point review of systems was otherwise negative except as noted.  [ ] Due to altered mental status/intubation, subjective information were not able to be obtained from the patient. History was obtained, to the extent possible, from review of the chart and collateral sources of information.      NEURO  Exam: awake, alert, oriented  Meds: acetaminophen  IVPB .. 1000 milliGRAM(s) IV Intermittent every 6 hours  HYDROmorphone  Injectable 0.5 milliGRAM(s) IV Push every 3 hours PRN Severe Pain (7 - 10)  ondansetron Injectable 4 milliGRAM(s) IV Push every 6 hours PRN Nausea and/or Vomiting    [x] Adequacy of sedation and pain control has been assessed and adjusted      RESPIRATORY  RR: 23 (02-05-20 @ 03:00) (16 - 36)  SpO2: 96% (02-05-20 @ 03:00) (96% - 100%)  Wt(kg): --  Exam: unlabored, clear to auscultation bilaterally    Meds: albuterol/ipratropium for Nebulization. 3 milliLiter(s) Nebulizer every 6 hours  buDESOnide    Inhalation Suspension 0.5 milliGRAM(s) Inhalation every 12 hours        CARDIOVASCULAR  HR: 87 (02-05-20 @ 03:00) (71 - 96)  BP: 100/58 (02-05-20 @ 03:00) (89/54 - 178/72)  BP(mean): 76 (02-05-20 @ 03:00) (64 - 110)  ABP: --  ABP(mean): --  Wt(kg): --  CVP(cm H2O): --  VBG - ( 04 Feb 2020 14:21 )  pH: 7.32  /  pCO2: 60    /  pO2: 58    / HCO3: 30    / Base Excess: 3.8   /  SaO2: 86         Exam: regular rate and rhythm  Cardiac Rhythm: sinus  Perfusion     [x]Adequate   [ ]Inadequate  Mentation   [x]Normal       [ ]Reduced  Extremities  [x]Warm         [ ]Cool  Volume Status [ ]Hypervolemic [x]Euvolemic [ ]Hypovolemic  Meds: tamsulosin 0.4 milliGRAM(s) Oral daily      GI/NUTRITION  Exam: soft, nontender, nondistended, incision C/D/I  Diet: NPO w/ TPN   Meds: pantoprazole  Injectable 40 milliGRAM(s) IV Push daily      GENITOURINARY  I&O's Detail    02-03 @ 07:01  -  02-04 @ 07:00  --------------------------------------------------------  IN:    fat emulsion (Fish Oil and Plant Based) 20% Infusion: 270.4 mL    Oral Fluid: 970 mL    Solution: 50 mL    TPN (Total Parenteral Nutrition): 1992 mL  Total IN: 3282.4 mL    OUT:    Ileostomy: 2000 mL    Incontinent per Condom Catheter: 100 mL    Voided: 1100 mL  Total OUT: 3200 mL    Total NET: 82.4 mL      02-04 @ 07:01  -  02-05 @ 03:14  --------------------------------------------------------  IN:    fat emulsion (Fish Oil and Plant Based) 20% Infusion: 208 mL    lactated ringers.: 600 mL    Solution: 200 mL    Solution: 50 mL    TPN (Total Parenteral Nutrition): 1660 mL  Total IN: 2718 mL    OUT:    Ileostomy: 60 mL    Indwelling Catheter - Urethral: 1165 mL    Voided: 100 mL  Total OUT: 1325 mL    Total NET: 1393 mL      Weight (kg): 54 (02-04 @ 08:35)  02-04    136  |  98  |  28<H>  ----------------------------<  117<H>  4.4   |  30  |  0.45<L>    Ca    8.6      04 Feb 2020 21:35  Phos  4.0     02-04  Mg     1.8     02-04    TPro  6.1  /  Alb  2.7<L>  /  TBili  1.4<H>  /  DBili  0.8<H>  /  AST  31  /  ALT  35  /  AlkPhos  97  02-04    [ ] Martinez catheter, indication: N/A  Meds: ergocalciferol 39202 Unit(s) Oral every week  fat emulsion (Fish Oil and Plant Based) 20% Infusion 20.8 mL/Hr IV Continuous <Continuous>  lactated ringers. 1000 milliLiter(s) IV Continuous <Continuous>  Parenteral Nutrition - Adult 1 Each TPN Continuous <Continuous>        HEMATOLOGIC  Meds: enoxaparin Injectable 40 milliGRAM(s) SubCutaneous daily    [x] VTE Prophylaxis                        7.9    18.35 )-----------( 307      ( 04 Feb 2020 21:35 )             24.2     PT/INR - ( 03 Feb 2020 13:58 )   PT: 13.0 sec;   INR: 1.14 ratio         PTT - ( 03 Feb 2020 13:58 )  PTT:43.1 sec  Transfusion     [ ] PRBC   [ ] Platelets   [ ] FFP   [ ] Cryoprecipitate      INFECTIOUS DISEASES  WBC Count: 18.35 K/uL (02-04 @ 21:35)  WBC Count: 14.95 K/uL (02-04 @ 14:24)    RECENT CULTURES:    Meds:       ENDOCRINE  CAPILLARY BLOOD GLUCOSE      Meds: octreotide  Injectable 100 MICROGram(s) SubCutaneous three times a day        ACCESS DEVICES:  [ ] Peripheral IV  [ ] Central Venous Line	[ ] R	[ ] L	[ ] IJ	[ ] Fem	[ ] SC	Placed:   [ ] Arterial Line		[ ] R	[ ] L	[ ] Fem	[ ] Rad	[ ] Ax	Placed:   [ ] PICC:					[ ] Mediport  [ ] Urinary Catheter, Date Placed:   [x] Necessity of urinary, arterial, and venous catheters discussed    OTHER MEDICATIONS:  benzocaine 15 mG/menthol 3.6 mG (Sugar-Free) Lozenge 1 Lozenge Oral every 4 hours PRN  chlorhexidine 2% Cloths 1 Application(s) Topical <User Schedule>  nystatin/triamcinolone Cream 1 Application(s) Topical two times a day  triamcinolone 0.1% Ointment 1 Application(s) Topical two times a day      CODE STATUS: Full code < from: Xray Chest 1 View- PORTABLE-Urgent (02.04.20 @ 15:14) >    IMAGING:  EXAM:  XR CHEST PORTABLE URGENT 1V                            PROCEDURE DATE:  02/04/2020            INTERPRETATION:  A single chest x-ray was obtained on February 4, 2020.    Indication: NG tube placement.    Impression:    The heart is normal in size. Left lower lobe pneumonia and/or atelectasis. The right lung is clear. Endotracheal tube was removed. NG tube is in the stomach. A PICC line is seen on the right and the tip is in the superior vena cava. No pneumothorax. Calcified aortic knob.    < end of copied text > HISTORY  75y Male past medical history of COPD and EtOH dependence who presented on 12/6/2019 with abdominal pain, nausea, hematemesis, and poor PO intake for ~2-3 days. In the ED, he was found to be hypotensive & tachycardic. Labs revealed an CHI and lactic acidosis. Imaging revealed a high grade SBO in the RLQ on CT scan. Hospital course is as follows:  12/06 - s/p exploratory laparotomy, lysis of adhesions, decompression of bowel via enterotomy w/ primary repair, and Abthera VAC placement as the distal 50% of the bowel appeared dusky but was still viable, admitted to SICU post-operatively as he was on vasopressor support and was left intubated  12/08 - s/p re-exploration, proximal 165 cm of small bowel appeared pink & viable but beyond that had patchy areas of ischemia so decision was made to give the bowel more time to demarcate before resecting in order to preserve as much small bowel as possible so Abthera VAC was replaced  12/09 - s/p re-exploration, small bowel resection of 150 cm (150 cm remaining), ileocecetomy, end ileostomy, mucous fistula, and abdominal closure  12/11 - extubated to BiPAP, noted to be in SVT that was rate controlled w/ metoprolol  12/14 - started on TPN  12/15 - noted to have spontaneous left pneumothorax s/p left pigtail catheter placement, noted to be in SVT again so amiodarone started, high ileostomy output noted so concern for short bowel syndrome  12/16 - amiodarone discontinued, started on beta blocker with metoprolol  12/18 - started Lomotil and ileostomy repletions with IV fluids  12/19 - started Imodium, left pigtail catheter was placed to water seal but pneumothorax noted on follow-up CXR so placed back to suction  12/20 - started tincture of opium, concern for aspiration so changed diet from regular to dysphagia 1 pureed with nectar-thickened liquids  12/22 - left pigtail catheter placed to water seal with no pneumothorax noted on follow-up CXR, CT chest with moderate left pleural effusion not being drained by pigtail catheter  12/23 - 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  12/24 - left pigtail catheter discontinued  12/28 - started Ancef for erythematous midline wound  12/29 - beta blocker discontinued for intermittent episodes of hypotension  12/30 - left pigtail catheter placement by IR with drainage of serous transudative fluid  01/02 - FEES demonstrating penetration with thin liquids so patient kept on dysphagia 1 pureed with nectar-thickened liquids  01/07 - Transferred to floors  01/10 - s/p left VATS w/ PleurX catheter placement  01/11 - evaluated by speech & swallow, advanced diet to mechanical soft with thin liquids  01/12 - PleurX catheter placed to water seal with on pneumothorax on follow-up CXR  01/13 - RRT for hypoxemia, placed on BiPAP, PleurX catheter placed back to suction, started vancomycin & Zosyn for possible HCAP  01/14 - noted to have minimal output from PleurX catheter, lung ultrasound with large left pleural effusion concerning for hemothorax, multiple episodes of bradycardia secondary to hypoxemia, remains on BiPAP  01/15 - worsening respiratory distress requiring intubation, hypotensive requiring vasopressor support, CT chest with large left hemothorax w/ trace pneumothorax & extensive subcutaneous emphysema so taken to OR emergently for left VATS, evacuation fo 2 L of clot, and placement of two left chest tubes  01/16 - extubated, weaned off vasopressor support  01/17 - two left chest tubes placed to water seal with no pneumothorax noted on follow-up CXR  02/01- Remains ileostomy reversal planning, chest tubes discontinued.     24 HOUR EVENTS:  -s/p Ileostomy reversal    SUBJECTIVE/ROS:  [x] A ten-point review of systems was otherwise negative except as noted.  [ ] Due to altered mental status/intubation, subjective information were not able to be obtained from the patient. History was obtained, to the extent possible, from review of the chart and collateral sources of information.      NEURO  Exam: awake, alert, oriented  Meds: acetaminophen  IVPB .. 1000 milliGRAM(s) IV Intermittent every 6 hours  HYDROmorphone  Injectable 0.5 milliGRAM(s) IV Push every 3 hours PRN Severe Pain (7 - 10)  ondansetron Injectable 4 milliGRAM(s) IV Push every 6 hours PRN Nausea and/or Vomiting    [x] Adequacy of sedation and pain control has been assessed and adjusted      RESPIRATORY  RR: 23 (02-05-20 @ 03:00) (16 - 36)  SpO2: 96% (02-05-20 @ 03:00) (96% - 100%)  Wt(kg): --  Exam: unlabored, clear to auscultation bilaterally    Meds: albuterol/ipratropium for Nebulization. 3 milliLiter(s) Nebulizer every 6 hours  buDESOnide    Inhalation Suspension 0.5 milliGRAM(s) Inhalation every 12 hours        CARDIOVASCULAR  HR: 87 (02-05-20 @ 03:00) (71 - 96)  BP: 100/58 (02-05-20 @ 03:00) (89/54 - 178/72)  BP(mean): 76 (02-05-20 @ 03:00) (64 - 110)  ABP: --  ABP(mean): --  Wt(kg): --  CVP(cm H2O): --  VBG - ( 04 Feb 2020 14:21 )  pH: 7.32  /  pCO2: 60    /  pO2: 58    / HCO3: 30    / Base Excess: 3.8   /  SaO2: 86         Exam: regular rate and rhythm  Cardiac Rhythm: sinus  Perfusion     [x]Adequate   [ ]Inadequate  Mentation   [x]Normal       [ ]Reduced  Extremities  [x]Warm         [ ]Cool  Volume Status [ ]Hypervolemic [x]Euvolemic [ ]Hypovolemic  Meds: tamsulosin 0.4 milliGRAM(s) Oral daily      GI/NUTRITION  Exam: soft, nontender, nondistended, incision C/D/I  Diet: NPO w/ TPN   Meds: pantoprazole  Injectable 40 milliGRAM(s) IV Push daily      GENITOURINARY  I&O's Detail    02-03 @ 07:01  -  02-04 @ 07:00  --------------------------------------------------------  IN:    fat emulsion (Fish Oil and Plant Based) 20% Infusion: 270.4 mL    Oral Fluid: 970 mL    Solution: 50 mL    TPN (Total Parenteral Nutrition): 1992 mL  Total IN: 3282.4 mL    OUT:    Ileostomy: 2000 mL    Incontinent per Condom Catheter: 100 mL    Voided: 1100 mL  Total OUT: 3200 mL    Total NET: 82.4 mL      02-04 @ 07:01  -  02-05 @ 03:14  --------------------------------------------------------  IN:    fat emulsion (Fish Oil and Plant Based) 20% Infusion: 208 mL    lactated ringers.: 600 mL    Solution: 200 mL    Solution: 50 mL    TPN (Total Parenteral Nutrition): 1660 mL  Total IN: 2718 mL    OUT:    Ileostomy: 60 mL    Indwelling Catheter - Urethral: 1165 mL    Voided: 100 mL  Total OUT: 1325 mL    Total NET: 1393 mL      Weight (kg): 54 (02-04 @ 08:35)  02-04    136  |  98  |  28<H>  ----------------------------<  117<H>  4.4   |  30  |  0.45<L>    Ca    8.6      04 Feb 2020 21:35  Phos  4.0     02-04  Mg     1.8     02-04    TPro  6.1  /  Alb  2.7<L>  /  TBili  1.4<H>  /  DBili  0.8<H>  /  AST  31  /  ALT  35  /  AlkPhos  97  02-04    [ ] Martinez catheter, indication: N/A  Meds: ergocalciferol 43618 Unit(s) Oral every week  fat emulsion (Fish Oil and Plant Based) 20% Infusion 20.8 mL/Hr IV Continuous <Continuous>  lactated ringers. 1000 milliLiter(s) IV Continuous <Continuous>  Parenteral Nutrition - Adult 1 Each TPN Continuous <Continuous>        HEMATOLOGIC  Meds: enoxaparin Injectable 40 milliGRAM(s) SubCutaneous daily    [x] VTE Prophylaxis                        7.9    18.35 )-----------( 307      ( 04 Feb 2020 21:35 )             24.2     PT/INR - ( 03 Feb 2020 13:58 )   PT: 13.0 sec;   INR: 1.14 ratio         PTT - ( 03 Feb 2020 13:58 )  PTT:43.1 sec  Transfusion     [ ] PRBC   [ ] Platelets   [ ] FFP   [ ] Cryoprecipitate      INFECTIOUS DISEASES  WBC Count: 18.35 K/uL (02-04 @ 21:35)  WBC Count: 14.95 K/uL (02-04 @ 14:24)    RECENT CULTURES:    Meds:       ENDOCRINE  CAPILLARY BLOOD GLUCOSE      Meds: octreotide  Injectable 100 MICROGram(s) SubCutaneous three times a day        ACCESS DEVICES:  [ ] Peripheral IV  [ ] Central Venous Line	[ ] R	[ ] L	[ ] IJ	[ ] Fem	[ ] SC	Placed:   [ ] Arterial Line		[ ] R	[ ] L	[ ] Fem	[ ] Rad	[ ] Ax	Placed:   [ ] PICC:					[ ] Mediport  [ ] Urinary Catheter, Date Placed:   [x] Necessity of urinary, arterial, and venous catheters discussed    OTHER MEDICATIONS:  benzocaine 15 mG/menthol 3.6 mG (Sugar-Free) Lozenge 1 Lozenge Oral every 4 hours PRN  chlorhexidine 2% Cloths 1 Application(s) Topical <User Schedule>  nystatin/triamcinolone Cream 1 Application(s) Topical two times a day  triamcinolone 0.1% Ointment 1 Application(s) Topical two times a day      CODE STATUS: Full code < from: Xray Chest 1 View- PORTABLE-Urgent (02.04.20 @ 15:14) >    IMAGING:  EXAM:  XR CHEST PORTABLE URGENT 1V                            PROCEDURE DATE:  02/04/2020            INTERPRETATION:  A single chest x-ray was obtained on February 4, 2020.    Indication: NG tube placement.    Impression:    The heart is normal in size. Left lower lobe pneumonia and/or atelectasis. The right lung is clear. Endotracheal tube was removed. NG tube is in the stomach. A PICC line is seen on the right and the tip is in the superior vena cava. No pneumothorax. Calcified aortic knob.    < end of copied text > 24 HOUR EVENTS:  - Taken to the Operating Theatre, where he underwent ileostomy reversal.   - Hemoglobin and hematocrit downtrending.     HISTORY  75y Male past medical history of COPD and EtOH dependence who presented on 12/6/2019 with abdominal pain, nausea, hematemesis, and poor PO intake for ~2-3 days. In the ED, he was found to be hypotensive & tachycardic. Labs revealed an CHI and lactic acidosis. Imaging revealed a high grade SBO in the RLQ on CT scan. Hospital course is as follows:  12/06 - s/p exploratory laparotomy, lysis of adhesions, decompression of bowel via enterotomy w/ primary repair, and Abthera VAC placement as the distal 50% of the bowel appeared dusky but was still viable, admitted to SICU post-operatively as he was on vasopressor support and was left intubated  12/08 - s/p re-exploration, proximal 165 cm of small bowel appeared pink & viable but beyond that had patchy areas of ischemia so decision was made to give the bowel more time to demarcate before resecting in order to preserve as much small bowel as possible so Abthera VAC was replaced  12/09 - s/p re-exploration, small bowel resection of 150 cm (150 cm remaining), ileocecetomy, end ileostomy, mucous fistula, and abdominal closure  12/11 - extubated to BiPAP, noted to be in SVT that was rate controlled w/ metoprolol  12/14 - started on TPN  12/15 - noted to have spontaneous left pneumothorax s/p left pigtail catheter placement, noted to be in SVT again so amiodarone started, high ileostomy output noted so concern for short bowel syndrome  12/16 - amiodarone discontinued, started on beta blocker with metoprolol  12/18 - started Lomotil and ileostomy repletions with IV fluids  12/19 - started Imodium, left pigtail catheter was placed to water seal but pneumothorax noted on follow-up CXR so placed back to suction  12/20 - started tincture of opium, concern for aspiration so changed diet from regular to dysphagia 1 pureed with nectar-thickened liquids  12/22 - left pigtail catheter placed to water seal with no pneumothorax noted on follow-up CXR, CT chest with moderate left pleural effusion not being drained by pigtail catheter  12/23 - 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  12/24 - left pigtail catheter discontinued  12/28 - started Ancef for erythematous midline wound  12/29 - beta blocker discontinued for intermittent episodes of hypotension  12/30 - left pigtail catheter placement by IR with drainage of serous transudative fluid  01/02 - FEES demonstrating penetration with thin liquids so patient kept on dysphagia 1 pureed with nectar-thickened liquids  01/07 - Transferred to floors  01/10 - s/p left VATS w/ PleurX catheter placement  01/11 - evaluated by speech & swallow, advanced diet to mechanical soft with thin liquids  01/12 - PleurX catheter placed to water seal with on pneumothorax on follow-up CXR  01/13 - RRT for hypoxemia, placed on BiPAP, PleurX catheter placed back to suction, started vancomycin & Zosyn for possible HCAP  01/14 - noted to have minimal output from PleurX catheter, lung ultrasound with large left pleural effusion concerning for hemothorax, multiple episodes of bradycardia secondary to hypoxemia, remains on BiPAP  01/15 - worsening respiratory distress requiring intubation, hypotensive requiring vasopressor support, CT chest with large left hemothorax w/ trace pneumothorax & extensive subcutaneous emphysema so taken to OR emergently for left VATS, evacuation fo 2 L of clot, and placement of two left chest tubes  01/16 - extubated, weaned off vasopressor support  01/17 - two left chest tubes placed to water seal with no pneumothorax noted on follow-up CXR  02/01- Remains ileostomy reversal planning, chest tubes discontinued.   02/04- Ileostomy reversal.       SUBJECTIVE/ROS:  [x] A ten-point review of systems was otherwise negative except as noted.  [ ] Due to altered mental status/intubation, subjective information were not able to be obtained from the patient. History was obtained, to the extent possible, from review of the chart and collateral sources of information.      NEURO  Exam: awake, alert, oriented  Meds: acetaminophen  IVPB .. 1000 milliGRAM(s) IV Intermittent every 6 hours  HYDROmorphone  Injectable 0.5 milliGRAM(s) IV Push every 3 hours PRN Severe Pain (7 - 10)  ondansetron Injectable 4 milliGRAM(s) IV Push every 6 hours PRN Nausea and/or Vomiting    [x] Adequacy of sedation and pain control has been assessed and adjusted      RESPIRATORY  RR: 23 (02-05-20 @ 03:00) (16 - 36)  SpO2: 96% (02-05-20 @ 03:00) (96% - 100%)  Exam: unlabored, clear to auscultation bilaterally    Meds: albuterol/ipratropium for Nebulization. 3 milliLiter(s) Nebulizer every 6 hours  buDESOnide    Inhalation Suspension 0.5 milliGRAM(s) Inhalation every 12 hours        CARDIOVASCULAR  HR: 87 (02-05-20 @ 03:00) (71 - 96)  BP: 100/58 (02-05-20 @ 03:00) (89/54 - 178/72)  BP(mean): 76 (02-05-20 @ 03:00) (64 - 110)  VBG - ( 04 Feb 2020 14:21 )  pH: 7.32  /  pCO2: 60    /  pO2: 58    / HCO3: 30    / Base Excess: 3.8   /  SaO2: 86         Exam: regular rate and rhythm  Cardiac Rhythm: sinus  Perfusion     [x]Adequate   [ ]Inadequate  Mentation   [x]Normal       [ ]Reduced  Extremities  [x]Warm         [ ]Cool  Volume Status [ ]Hypervolemic [x]Euvolemic [ ]Hypovolemic  Meds: tamsulosin 0.4 milliGRAM(s) Oral daily      GI/NUTRITION  Exam: soft, nontender, nondistended, incision C/D/I  Diet: NPO w/ TPN   Meds: pantoprazole  Injectable 40 milliGRAM(s) IV Push daily      GENITOURINARY  I&O's Detail    02-03 @ 07:01  -  02-04 @ 07:00  --------------------------------------------------------  IN:    fat emulsion (Fish Oil and Plant Based) 20% Infusion: 270.4 mL    Oral Fluid: 970 mL    Solution: 50 mL    TPN (Total Parenteral Nutrition): 1992 mL  Total IN: 3282.4 mL    OUT:    Ileostomy: 2000 mL    Incontinent per Condom Catheter: 100 mL    Voided: 1100 mL  Total OUT: 3200 mL    Total NET: 82.4 mL      02-04 @ 07:01  -  02-05 @ 03:14  --------------------------------------------------------  IN:    fat emulsion (Fish Oil and Plant Based) 20% Infusion: 208 mL    lactated ringers.: 600 mL    Solution: 200 mL    Solution: 50 mL    TPN (Total Parenteral Nutrition): 1660 mL  Total IN: 2718 mL    OUT:    Ileostomy: 60 mL    Indwelling Catheter - Urethral: 1165 mL    Voided: 100 mL  Total OUT: 1325 mL    Total NET: 1393 mL      Weight (kg): 54 (02-04 @ 08:35)  02-04    136  |  98  |  28<H>  ----------------------------<  117<H>  4.4   |  30  |  0.45<L>    Ca    8.6      04 Feb 2020 21:35  Phos  4.0     02-04  Mg     1.8     02-04    TPro  6.1  /  Alb  2.7<L>  /  TBili  1.4<H>  /  DBili  0.8<H>  /  AST  31  /  ALT  35  /  AlkPhos  97  02-04    [ ] Martinez catheter, indication: N/A  Meds: ergocalciferol 48838 Unit(s) Oral every week  fat emulsion (Fish Oil and Plant Based) 20% Infusion 20.8 mL/Hr IV Continuous <Continuous>  lactated ringers. 1000 milliLiter(s) IV Continuous <Continuous>  Parenteral Nutrition - Adult 1 Each TPN Continuous <Continuous>        HEMATOLOGIC  Meds: enoxaparin Injectable 40 milliGRAM(s) SubCutaneous daily    [x] VTE Prophylaxis                        7.9    18.35 )-----------( 307      ( 04 Feb 2020 21:35 )             24.2     PT/INR - ( 03 Feb 2020 13:58 )   PT: 13.0 sec;   INR: 1.14 ratio         PTT - ( 03 Feb 2020 13:58 )  PTT:43.1 sec  Transfusion     [ ] PRBC   [ ] Platelets   [ ] FFP   [ ] Cryoprecipitate      INFECTIOUS DISEASES  WBC Count: 18.35 K/uL (02-04 @ 21:35)  WBC Count: 14.95 K/uL (02-04 @ 14:24)    RECENT CULTURES:    Meds:       ENDOCRINE  CAPILLARY BLOOD GLUCOSE      Meds: octreotide  Injectable 100 MICROGram(s) SubCutaneous three times a day        ACCESS DEVICES:  [ ] Peripheral IV  [ ] Central Venous Line	[ ] R	[ ] L	[ ] IJ	[ ] Fem	[ ] SC	Placed:   [ ] Arterial Line		[ ] R	[ ] L	[ ] Fem	[ ] Rad	[ ] Ax	Placed:   [ ] PICC:					[ ] Mediport  [ ] Urinary Catheter, Date Placed:   [x] Necessity of urinary, arterial, and venous catheters discussed    OTHER MEDICATIONS:  benzocaine 15 mG/menthol 3.6 mG (Sugar-Free) Lozenge 1 Lozenge Oral every 4 hours PRN  chlorhexidine 2% Cloths 1 Application(s) Topical <User Schedule>  nystatin/triamcinolone Cream 1 Application(s) Topical two times a day  triamcinolone 0.1% Ointment 1 Application(s) Topical two times a day      CODE STATUS: Full code < from: Xray Chest 1 View- PORTABLE-Urgent (02.04.20 @ 15:14) >    IMAGING:  EXAM:  XR CHEST PORTABLE URGENT 1V                            PROCEDURE DATE:  02/04/2020            INTERPRETATION:  A single chest x-ray was obtained on February 4, 2020.    Indication: NG tube placement.    Impression:    The heart is normal in size. Left lower lobe pneumonia and/or atelectasis. The right lung is clear. Endotracheal tube was removed. NG tube is in the stomach. A PICC line is seen on the right and the tip is in the superior vena cava. No pneumothorax. Calcified aortic knob.    < end of copied text >

## 2020-02-05 NOTE — PROGRESS NOTE ADULT - SUBJECTIVE AND OBJECTIVE BOX
Subjective: Patient seen and examined. No new events except as noted.   remains in ICU   s/p Ileostomy reversal   Abd pain   no cp or sob       REVIEW OF SYSTEMS:    CONSTITUTIONAL: + weakness, fevers or chills  EYES/ENT: No visual changes;  No vertigo or throat pain   NECK: No pain or stiffness  RESPIRATORY: No cough, wheezing, hemoptysis; No shortness of breath  CARDIOVASCULAR: No chest pain or palpitations  GASTROINTESTINAL: No abdominal or epigastric pain. No nausea, vomiting, or hematemesis; No diarrhea or constipation. No melena or hematochezia.  GENITOURINARY: No dysuria, frequency or hematuria  NEUROLOGICAL: No numbness or weakness  SKIN: No itching, burning, rashes, or lesions   All other review of systems is negative unless indicated above.    MEDICATIONS:  MEDICATIONS  (STANDING):  acetaminophen  IVPB .. 1000 milliGRAM(s) IV Intermittent every 6 hours  albuterol/ipratropium for Nebulization. 3 milliLiter(s) Nebulizer every 6 hours  buDESOnide    Inhalation Suspension 0.5 milliGRAM(s) Inhalation every 12 hours  chlorhexidine 2% Cloths 1 Application(s) Topical <User Schedule>  enoxaparin Injectable 40 milliGRAM(s) SubCutaneous daily  ergocalciferol 57456 Unit(s) Oral every week  fat emulsion (Fish Oil and Plant Based) 20% Infusion 20.8 mL/Hr (20.8 mL/Hr) IV Continuous <Continuous>  nystatin/triamcinolone Cream 1 Application(s) Topical two times a day  octreotide  Injectable 100 MICROGram(s) SubCutaneous three times a day  pantoprazole  Injectable 40 milliGRAM(s) IV Push daily  Parenteral Nutrition - Adult 1 Each (83 mL/Hr) TPN Continuous <Continuous>  tamsulosin 0.4 milliGRAM(s) Oral daily  triamcinolone 0.1% Ointment 1 Application(s) Topical two times a day      PHYSICAL EXAM:  T(C): 37.1 (02-05-20 @ 07:00), Max: 37.3 (02-05-20 @ 03:00)  HR: 94 (02-05-20 @ 09:00) (83 - 96)  BP: 106/70 (02-05-20 @ 09:00) (95/53 - 178/72)  RR: 22 (02-05-20 @ 09:00) (16 - 23)  SpO2: 96% (02-05-20 @ 09:00) (96% - 100%)  Wt(kg): --  I&O's Summary    04 Feb 2020 07:01  -  05 Feb 2020 07:00  --------------------------------------------------------  IN: 3362.4 mL / OUT: 1810 mL / NET: 1552.4 mL    05 Feb 2020 07:01  -  05 Feb 2020 09:53  --------------------------------------------------------  IN: 166 mL / OUT: 185 mL / NET: -19 mL          Appearance: NAD	  HEENT:   Dry  oral mucosa, PERRL, EOMI	  Lymphatic: No lymphadenopathy +NGT   Cardiovascular: Normal S1 S2, No JVD, No murmurs , Peripheral pulses palpable 2+ bilaterally  Respiratory: decreased bs   Gastrointestinal:  Soft, +tender, + BS	  Skin: No rashes, No ecchymoses, No cyanosis, warm to touch  Musculoskeletal: Decreased range of motion and  strength, LUE edema from elbow joint to shoulder   Psychiatry:  Mood & affect appropriate  Ext: no edema    LABS:    CARDIAC MARKERS:                                8.0    13.52 )-----------( 296      ( 05 Feb 2020 03:33 )             24.3     02-04    136  |  98  |  28<H>  ----------------------------<  117<H>  4.4   |  30  |  0.45<L>    Ca    8.6      04 Feb 2020 21:35  Phos  4.0     02-04  Mg     1.8     02-04    TPro  6.1  /  Alb  2.7<L>  /  TBili  1.4<H>  /  DBili  0.8<H>  /  AST  31  /  ALT  35  /  AlkPhos  97  02-04    proBNP:   Lipid Profile:   HgA1c:   TSH:             TELEMETRY: SR,ST	    ECG:  	  RADIOLOGY:   DIAGNOSTIC TESTING:  [ ] Echocardiogram:  [ ]  Catheterization:  [ ] Stress Test:    OTHER:

## 2020-02-06 DIAGNOSIS — J43.9 EMPHYSEMA, UNSPECIFIED: ICD-10-CM

## 2020-02-06 DIAGNOSIS — J94.2 HEMOTHORAX: ICD-10-CM

## 2020-02-06 LAB
ALBUMIN SERPL ELPH-MCNC: 2.4 G/DL — LOW (ref 3.3–5)
ALP SERPL-CCNC: 75 U/L — SIGNIFICANT CHANGE UP (ref 40–120)
ALT FLD-CCNC: 26 U/L — SIGNIFICANT CHANGE UP (ref 10–45)
ANION GAP SERPL CALC-SCNC: 10 MMOL/L — SIGNIFICANT CHANGE UP (ref 5–17)
APPEARANCE UR: CLEAR — SIGNIFICANT CHANGE UP
AST SERPL-CCNC: 21 U/L — SIGNIFICANT CHANGE UP (ref 10–40)
BILIRUB DIRECT SERPL-MCNC: 0.6 MG/DL — HIGH (ref 0–0.2)
BILIRUB INDIRECT FLD-MCNC: 0.5 MG/DL — SIGNIFICANT CHANGE UP (ref 0.2–1)
BILIRUB SERPL-MCNC: 1.1 MG/DL — SIGNIFICANT CHANGE UP (ref 0.2–1.2)
BILIRUB UR-MCNC: NEGATIVE — SIGNIFICANT CHANGE UP
BLD GP AB SCN SERPL QL: NEGATIVE — SIGNIFICANT CHANGE UP
BUN SERPL-MCNC: 26 MG/DL — HIGH (ref 7–23)
CA-I BLD-SCNC: 1.14 MMOL/L — SIGNIFICANT CHANGE UP (ref 1.12–1.3)
CALCIUM SERPL-MCNC: 8 MG/DL — LOW (ref 8.4–10.5)
CHLORIDE SERPL-SCNC: 100 MMOL/L — SIGNIFICANT CHANGE UP (ref 96–108)
CO2 SERPL-SCNC: 27 MMOL/L — SIGNIFICANT CHANGE UP (ref 22–31)
COLOR SPEC: YELLOW — SIGNIFICANT CHANGE UP
CREAT SERPL-MCNC: 0.43 MG/DL — LOW (ref 0.5–1.3)
DIFF PNL FLD: NEGATIVE — SIGNIFICANT CHANGE UP
GLUCOSE SERPL-MCNC: 116 MG/DL — HIGH (ref 70–99)
GLUCOSE UR QL: NEGATIVE — SIGNIFICANT CHANGE UP
HCT VFR BLD CALC: 21.3 % — LOW (ref 39–50)
HCT VFR BLD CALC: 24.4 % — LOW (ref 39–50)
HGB BLD-MCNC: 6.7 G/DL — CRITICAL LOW (ref 13–17)
HGB BLD-MCNC: 7.8 G/DL — LOW (ref 13–17)
KETONES UR-MCNC: NEGATIVE — SIGNIFICANT CHANGE UP
LEUKOCYTE ESTERASE UR-ACNC: NEGATIVE — SIGNIFICANT CHANGE UP
MAGNESIUM SERPL-MCNC: 1.8 MG/DL — SIGNIFICANT CHANGE UP (ref 1.6–2.6)
MCHC RBC-ENTMCNC: 29.4 PG — SIGNIFICANT CHANGE UP (ref 27–34)
MCHC RBC-ENTMCNC: 29.5 PG — SIGNIFICANT CHANGE UP (ref 27–34)
MCHC RBC-ENTMCNC: 31.5 GM/DL — LOW (ref 32–36)
MCHC RBC-ENTMCNC: 32 GM/DL — SIGNIFICANT CHANGE UP (ref 32–36)
MCV RBC AUTO: 92.1 FL — SIGNIFICANT CHANGE UP (ref 80–100)
MCV RBC AUTO: 93.8 FL — SIGNIFICANT CHANGE UP (ref 80–100)
NITRITE UR-MCNC: NEGATIVE — SIGNIFICANT CHANGE UP
NRBC # BLD: 0 /100 WBCS — SIGNIFICANT CHANGE UP (ref 0–0)
NRBC # BLD: 0 /100 WBCS — SIGNIFICANT CHANGE UP (ref 0–0)
PH UR: 6 — SIGNIFICANT CHANGE UP (ref 5–8)
PHOSPHATE SERPL-MCNC: 2.9 MG/DL — SIGNIFICANT CHANGE UP (ref 2.5–4.5)
PLATELET # BLD AUTO: 278 K/UL — SIGNIFICANT CHANGE UP (ref 150–400)
PLATELET # BLD AUTO: 287 K/UL — SIGNIFICANT CHANGE UP (ref 150–400)
POTASSIUM SERPL-MCNC: 3.7 MMOL/L — SIGNIFICANT CHANGE UP (ref 3.5–5.3)
POTASSIUM SERPL-SCNC: 3.7 MMOL/L — SIGNIFICANT CHANGE UP (ref 3.5–5.3)
PROT SERPL-MCNC: 5.6 G/DL — LOW (ref 6–8.3)
PROT UR-MCNC: ABNORMAL
RBC # BLD: 2.27 M/UL — LOW (ref 4.2–5.8)
RBC # BLD: 2.65 M/UL — LOW (ref 4.2–5.8)
RBC # FLD: 14.3 % — SIGNIFICANT CHANGE UP (ref 10.3–14.5)
RBC # FLD: 14.4 % — SIGNIFICANT CHANGE UP (ref 10.3–14.5)
RH IG SCN BLD-IMP: POSITIVE — SIGNIFICANT CHANGE UP
SODIUM SERPL-SCNC: 137 MMOL/L — SIGNIFICANT CHANGE UP (ref 135–145)
SP GR SPEC: 1.03 — HIGH (ref 1.01–1.02)
UROBILINOGEN FLD QL: ABNORMAL
WBC # BLD: 13.55 K/UL — HIGH (ref 3.8–10.5)
WBC # BLD: 13.58 K/UL — HIGH (ref 3.8–10.5)
WBC # FLD AUTO: 13.55 K/UL — HIGH (ref 3.8–10.5)
WBC # FLD AUTO: 13.58 K/UL — HIGH (ref 3.8–10.5)

## 2020-02-06 PROCEDURE — 71045 X-RAY EXAM CHEST 1 VIEW: CPT | Mod: 26

## 2020-02-06 PROCEDURE — 99233 SBSQ HOSP IP/OBS HIGH 50: CPT

## 2020-02-06 PROCEDURE — 99232 SBSQ HOSP IP/OBS MODERATE 35: CPT

## 2020-02-06 RX ORDER — TAMSULOSIN HYDROCHLORIDE 0.4 MG/1
0.4 CAPSULE ORAL AT BEDTIME
Refills: 0 | Status: DISCONTINUED | OUTPATIENT
Start: 2020-02-07 | End: 2020-02-27

## 2020-02-06 RX ORDER — ELECTROLYTE SOLUTION,INJ
1 VIAL (ML) INTRAVENOUS
Refills: 0 | Status: DISCONTINUED | OUTPATIENT
Start: 2020-02-06 | End: 2020-02-06

## 2020-02-06 RX ORDER — LIDOCAINE HCL 20 MG/ML
5 VIAL (ML) INJECTION ONCE
Refills: 0 | Status: COMPLETED | OUTPATIENT
Start: 2020-02-06 | End: 2020-02-06

## 2020-02-06 RX ORDER — TAMSULOSIN HYDROCHLORIDE 0.4 MG/1
0.4 CAPSULE ORAL ONCE
Refills: 0 | Status: COMPLETED | OUTPATIENT
Start: 2020-02-06 | End: 2020-02-06

## 2020-02-06 RX ORDER — POTASSIUM CHLORIDE 20 MEQ
20 PACKET (EA) ORAL
Refills: 0 | Status: COMPLETED | OUTPATIENT
Start: 2020-02-06 | End: 2020-02-06

## 2020-02-06 RX ORDER — MAGNESIUM SULFATE 500 MG/ML
2 VIAL (ML) INJECTION ONCE
Refills: 0 | Status: COMPLETED | OUTPATIENT
Start: 2020-02-06 | End: 2020-02-06

## 2020-02-06 RX ORDER — I.V. FAT EMULSION 20 G/100ML
20.8 EMULSION INTRAVENOUS
Qty: 50 | Refills: 0 | Status: DISCONTINUED | OUTPATIENT
Start: 2020-02-06 | End: 2020-02-07

## 2020-02-06 RX ORDER — ACETAMINOPHEN 500 MG
1000 TABLET ORAL EVERY 6 HOURS
Refills: 0 | Status: COMPLETED | OUTPATIENT
Start: 2020-02-06 | End: 2020-02-07

## 2020-02-06 RX ADMIN — Medication 1000 MILLIGRAM(S): at 13:55

## 2020-02-06 RX ADMIN — PANTOPRAZOLE SODIUM 40 MILLIGRAM(S): 20 TABLET, DELAYED RELEASE ORAL at 11:39

## 2020-02-06 RX ADMIN — Medication 1000 MILLIGRAM(S): at 19:02

## 2020-02-06 RX ADMIN — I.V. FAT EMULSION 20.8 ML/HR: 20 EMULSION INTRAVENOUS at 17:13

## 2020-02-06 RX ADMIN — CHLORHEXIDINE GLUCONATE 1 APPLICATION(S): 213 SOLUTION TOPICAL at 05:42

## 2020-02-06 RX ADMIN — Medication 1 APPLICATION(S): at 05:41

## 2020-02-06 RX ADMIN — Medication 1 EACH: at 17:13

## 2020-02-06 RX ADMIN — Medication 400 MILLIGRAM(S): at 18:32

## 2020-02-06 RX ADMIN — Medication 3 MILLILITER(S): at 11:06

## 2020-02-06 RX ADMIN — HYDROMORPHONE HYDROCHLORIDE 0.5 MILLIGRAM(S): 2 INJECTION INTRAMUSCULAR; INTRAVENOUS; SUBCUTANEOUS at 23:39

## 2020-02-06 RX ADMIN — Medication 3 MILLILITER(S): at 17:25

## 2020-02-06 RX ADMIN — Medication 50 MILLIEQUIVALENT(S): at 05:40

## 2020-02-06 RX ADMIN — Medication 400 MILLIGRAM(S): at 05:41

## 2020-02-06 RX ADMIN — Medication 0.5 MILLIGRAM(S): at 17:25

## 2020-02-06 RX ADMIN — HYDROMORPHONE HYDROCHLORIDE 0.5 MILLIGRAM(S): 2 INJECTION INTRAMUSCULAR; INTRAVENOUS; SUBCUTANEOUS at 23:24

## 2020-02-06 RX ADMIN — ENOXAPARIN SODIUM 40 MILLIGRAM(S): 100 INJECTION SUBCUTANEOUS at 11:39

## 2020-02-06 RX ADMIN — Medication 5 MILLILITER(S): at 11:22

## 2020-02-06 RX ADMIN — Medication 50 GRAM(S): at 08:27

## 2020-02-06 RX ADMIN — Medication 50 MILLIEQUIVALENT(S): at 07:23

## 2020-02-06 RX ADMIN — HYDROMORPHONE HYDROCHLORIDE 0.5 MILLIGRAM(S): 2 INJECTION INTRAMUSCULAR; INTRAVENOUS; SUBCUTANEOUS at 13:40

## 2020-02-06 RX ADMIN — Medication 1 APPLICATION(S): at 16:23

## 2020-02-06 RX ADMIN — Medication 3 MILLILITER(S): at 00:54

## 2020-02-06 RX ADMIN — HYDROMORPHONE HYDROCHLORIDE 0.5 MILLIGRAM(S): 2 INJECTION INTRAMUSCULAR; INTRAVENOUS; SUBCUTANEOUS at 08:50

## 2020-02-06 RX ADMIN — Medication 400 MILLIGRAM(S): at 11:39

## 2020-02-06 RX ADMIN — Medication 0.5 MILLIGRAM(S): at 06:12

## 2020-02-06 RX ADMIN — TAMSULOSIN HYDROCHLORIDE 0.4 MILLIGRAM(S): 0.4 CAPSULE ORAL at 18:01

## 2020-02-06 RX ADMIN — HYDROMORPHONE HYDROCHLORIDE 0.5 MILLIGRAM(S): 2 INJECTION INTRAMUSCULAR; INTRAVENOUS; SUBCUTANEOUS at 09:05

## 2020-02-06 RX ADMIN — Medication 1 APPLICATION(S): at 17:13

## 2020-02-06 RX ADMIN — HYDROMORPHONE HYDROCHLORIDE 0.5 MILLIGRAM(S): 2 INJECTION INTRAMUSCULAR; INTRAVENOUS; SUBCUTANEOUS at 13:55

## 2020-02-06 RX ADMIN — Medication 3 MILLILITER(S): at 06:12

## 2020-02-06 NOTE — PROGRESS NOTE ADULT - SUBJECTIVE AND OBJECTIVE BOX
GENERAL SURGERY PROGRESS NOTE    SUBJECTIVE/24 HOUR EVENTS:   - Discontinued octreotide  - IV locked  - Unable to give Flomax through NG tube so changed to doxazosin  - Martinez discontinued and passed trial of void  - Bilateral LE Duplex negative for DVTs  - Dermatology consult obtained for persistent back rash determined to be irritated contact dermatitis so started triamcinolone 0.1% ointment BID on affected areas  - Patient seen and examined at bedside  - Patient feels well, on TPN, oob/ambi, pain controlled, denies f/c/n/v/d/sob  - NGT in place    OBJECTIVE  PHYSICAL EXAM:  General: Appears well, NAD  Neuro: AAOx3  CHEST: Clear to auscultation bilaterally,  CV: Regular rate and rhythm  Abdomen: soft, nontender, nondistended, no rebound or guarding, dressing c/d/i  Extremities: Grossly symmetric    V/S:  Vital Signs Last 24 Hrs  T(C): 37.2 (06 Feb 2020 11:00), Max: 37.2 (05 Feb 2020 15:00)  T(F): 99 (06 Feb 2020 11:00), Max: 99 (05 Feb 2020 15:00)  HR: 91 (06 Feb 2020 11:07) (86 - 104)  BP: 116/59 (06 Feb 2020 11:00) (97/55 - 137/62)  BP(mean): 82 (06 Feb 2020 11:00) (72 - 89)  RR: 19 (06 Feb 2020 11:00) (14 - 29)  SpO2: 95% (06 Feb 2020 11:07) (91% - 98%)    --------------------------------------------------------------------------------------------------  I/Os:    05 Feb 2020 07:01  -  06 Feb 2020 07:00  --------------------------------------------------------  IN:    fat emulsion (Fish Oil and Plant Based) 20% Infusion: 270.4 mL    Packed Red Blood Cells: 350 mL    Solution: 200 mL    Solution: 400 mL    TPN (Total Parenteral Nutrition): 1992 mL  Total IN: 3212.4 mL    OUT:    Indwelling Catheter - Urethral: 595 mL    Voided: 700 mL  Total OUT: 1295 mL    Total NET: 1917.4 mL      06 Feb 2020 07:01  -  06 Feb 2020 11:48  --------------------------------------------------------  IN:    Solution: 50 mL    TPN (Total Parenteral Nutrition): 332 mL  Total IN: 382 mL    OUT:    Intermittent Catheterization - Urethral: 700 mL    Voided: 250 mL  Total OUT: 950 mL    Total NET: -568 mL        --------------------------------------------------------------------------------------------------  LABS:                        7.8    13.55 )-----------( 287      ( 06 Feb 2020 08:55 )             24.4     06 Feb 2020 03:58    137    |  100    |  26     ----------------------------<  116    3.7     |  27     |  0.43     Ca    8.0        06 Feb 2020 03:58  Phos  2.9       06 Feb 2020 03:58  Mg     1.8       06 Feb 2020 03:58    TPro  5.6    /  Alb  2.4    /  TBili  1.1    /  DBili  0.6    /  AST  21     /  ALT  26     /  AlkPhos  75     06 Feb 2020 03:58      CAPILLARY BLOOD GLUCOSE            LIVER FUNCTIONS - ( 06 Feb 2020 03:58 )  Alb: 2.4 g/dL / Pro: 5.6 g/dL / ALK PHOS: 75 U/L / ALT: 26 U/L / AST: 21 U/L / GGT: x               --------------------------------------------------------------------------------------------------  MEDICATIONS  (STANDING):  albuterol/ipratropium for Nebulization. 3 milliLiter(s) Nebulizer every 6 hours  buDESOnide    Inhalation Suspension 0.5 milliGRAM(s) Inhalation every 12 hours  chlorhexidine 2% Cloths 1 Application(s) Topical <User Schedule>  doxazosin 2 milliGRAM(s) Oral at bedtime  enoxaparin Injectable 40 milliGRAM(s) SubCutaneous daily  ergocalciferol 36241 Unit(s) Oral every week  fat emulsion (Fish Oil and Plant Based) 20% Infusion 20.8 mL/Hr (20.8 mL/Hr) IV Continuous <Continuous>  nystatin/triamcinolone Cream 1 Application(s) Topical two times a day  pantoprazole  Injectable 40 milliGRAM(s) IV Push daily  Parenteral Nutrition - Adult 1 Each (83 mL/Hr) TPN Continuous <Continuous>  Parenteral Nutrition - Adult 1 Each (83 mL/Hr) TPN Continuous <Continuous>  triamcinolone 0.1% Ointment 1 Application(s) Topical two times a day    MEDICATIONS  (PRN):  benzocaine 15 mG/menthol 3.6 mG (Sugar-Free) Lozenge 1 Lozenge Oral every 4 hours PRN Sore Throat  HYDROmorphone  Injectable 0.5 milliGRAM(s) IV Push every 3 hours PRN Severe Pain (7 - 10)

## 2020-02-06 NOTE — CHART NOTE - NSCHARTNOTEFT_GEN_A_CORE
Nutrition Follow Up Note.    Patient seen for: malnutrition/TPN Team follow up    Source: medical record, TPN Team rounds [Pt interview pending]    Chart reviewed, events noted. Pt now S/P ileostomy reversal (2020). NGT to low wall suction.    Nutrition Status: Severe Malnutrition. Pt with 150 cm small bowel remaining after GI surgery x 3. TPN initiated . Pt was tolerating  po diet since . TPN discontinued as of 1/15; resumed at half goal . TPN increased to full dose on  to optimize nutrition prior to ileostomy reversal on . Pt now NPO with NGT to suction on POD #2; TPN infusing at full goal. No GI function post-op thus far.    TPN Adjustments: Volume increased to 2L on  to compensate for high fluid output, and NaCl increased to 80mEq. Plan to continue 60 mmol NaPhos, and 40 mEq KCL. Calcium gluconate previously increased to 14mEq; ionized Calcium now 1.14 <WNL>. MgSO4 previously increased to 14mEq; magnesium now 1.8 <WNL>; 2 grams magnesium sulfate IVPB ordered .    Diet: NPO    Parenteral Nutrition: (): 2L infusing at 83ml/hr (120Gm amino acids, 210Gm dextrose, 50Gm SMOF lipids); to provide: 1694cal (31Kcal/Kg and 2.2Gm protein/Kg dosing wt 54.2Kg); with 10ml MVI and 3ml MTE-5.     Non-Protein Calories: 1214kcal/day (22cal/Kg)  Dextrose Infusion Rate: 2.7 mg/Kg/min  Lipid Infusion Rate: 0.92 Gm/Kg/day; 0.08 Gm/Kg/hr    Last BM: none, no flatus, +belching. Rx for Lomotil, Imodium, opium tincture, metamucil powder, and octreotide discontinued.     Urine output x 24-hours: 1295ml    Daily Weight in k.5 (-), Weight in k.5 (), Weight in k.8 (), Weight in k.5 (), Weight in k.1 (-), Weight in k.2 (), Weight in k.5 ()    Drug Dosing Weight  Weight (kg): 54 (2020 08:35)  BMI (kg/m2): 17.6 (2020 08:35)    Pertinent Medications: MEDICATIONS  (STANDING):  acetaminophen  IVPB .. 1000 milliGRAM(s) IV Intermittent every 6 hours  albuterol/ipratropium for Nebulization. 3 milliLiter(s) Nebulizer every 6 hours  buDESOnide    Inhalation Suspension 0.5 milliGRAM(s) Inhalation every 12 hours  chlorhexidine 2% Cloths 1 Application(s) Topical <User Schedule>  doxazosin 2 milliGRAM(s) Oral at bedtime  enoxaparin Injectable 40 milliGRAM(s) SubCutaneous daily  ergocalciferol 58637 Unit(s) Oral every week  fat emulsion (Fish Oil and Plant Based) 20% Infusion 20.8 mL/Hr (20.8 mL/Hr) IV Continuous <Continuous>  magnesium sulfate  IVPB 2 Gram(s) IV Intermittent once  nystatin/triamcinolone Cream 1 Application(s) Topical two times a day  pantoprazole  Injectable 40 milliGRAM(s) IV Push daily  Parenteral Nutrition - Adult 1 Each (83 mL/Hr) TPN Continuous <Continuous>  triamcinolone 0.1% Ointment 1 Application(s) Topical two times a day    MEDICATIONS  (PRN):  benzocaine 15 mG/menthol 3.6 mG (Sugar-Free) Lozenge 1 Lozenge Oral every 4 hours PRN Sore Throat  HYDROmorphone  Injectable 0.5 milliGRAM(s) IV Push every 3 hours PRN Severe Pain (7 - 10)      LABS:    @ 03:58: Sodium 137, Potassium 3.7, Chloride 100, Calcium 8.0<L>, Magnesium 1.8, Phosphorus 2.9, BUN 26<H>, Creatinine 0.43<L>, <H>, Alk Phos 75, ALT/SGPT 26, AST/SGOT 21, HbA1c --, Total Protein 5.6<L>, Albumin 2.4<L>, Total Bilirubin 1.1, Direct Bilirubin 0.6<H>, Hemoglobin 6.7<LL>, Hematocrit 21.3<L>    Triglycerides, Serum: 53 mg/dL (20 @ 21:38)  Triglycerides, Serum: 59 mg/dL (20 @ 00:24)  Triglycerides, Serum: 61 mg/dL (20 @ 01:41)  Triglycerides, Serum: 51 mg/dL (20 @ 04:10)  Triglycerides, Serum: 83 mg/dL (20 @ 00:14)  Triglycerides, Serum: 78 mg/dL (20 @ 00:18)  Triglycerides, Serum: 76 mg/dL (20 @ 00:26)  Triglycerides, Serum: 51 mg/dL (01-15-20 @ 01:44)  Triglycerides, Serum: 60 mg/dL (20 @ 03:31)    Skin per nursing documentation: no pressure injuries documented  Edema: none noted    Estimated Needs: based on dosing wt 54.2Kg, with consideration for TPN, malnutrition  5101-5945 gregorio/day (25-30cal/Kg)   Gm protein/day (1.8-2.2Gm/Kg)     Previous Nutrition Diagnosis: Severe malnutrition  Nutrition Diagnosis is: ongoing, being addressed with TPN at full goal post-op    New Nutrition Diagnosis: none     Interventions: Continue TPN at full goal; monitor GI function    Recommend  1) TPN per TPN Team/Nutrition assessment; continue at full goal post-op  2) Monitor GI function; advance to clear liquids as tolerated  3) When diet is advanced, add 3 servings apple Ensure Clear (240cal, 8Gm protein per serving)  4) When diet is advanced, add 3 servings chocolate Ensure Enlive (350cal, 20Gm protein per 8oz serving)   5) When diet is advanced, add 1 Prosource (60cal, 15Gm protein)    Monitoring and Evaluation:     Continue to monitor nutrition provision and tolerance, weights, labs, skin integrity.    RD remains available upon request and will follow up per protocol.    Sowmya Graham, MS RD CDN [facility], Pager # [phone]. Nutrition Follow Up Note.    Patient seen for: malnutrition/TPN Team follow up    Source: medical record, TPN Team rounds [Pt interview pending]    Chart reviewed, events noted. Pt now S/P ileostomy reversal (2020). NGT to low wall suction.    Nutrition Status: Severe Malnutrition. Pt with 150 cm small bowel remaining after GI surgery x 3. TPN initiated . Pt was tolerating  po diet since . TPN discontinued as of 1/15; resumed at half goal . TPN increased to full dose on  to optimize nutrition prior to ileostomy reversal on . Pt now NPO with NGT to suction on POD #2; TPN infusing at full goal. No GI function post-op thus far.    TPN Adjustments: Volume increased to 2L on  to compensate for high fluid output, and NaCl increased to 80mEq. Plan to continue 60 mmol NaPhos, and 40 mEq KCL. Calcium gluconate previously increased to 14mEq; ionized Calcium now 1.14 <WNL>. MgSO4 previously increased to 14mEq; magnesium now 1.8 <WNL>; 2 grams magnesium sulfate IVPB ordered .    Diet: NPO    Parenteral Nutrition: (): 2L infusing at 83ml/hr (120Gm amino acids, 210Gm dextrose, 50Gm SMOF lipids); to provide: 1694cal (31Kcal/Kg and 2.2Gm protein/Kg dosing wt 54.2Kg); with 10ml MVI and 3ml MTE-5.     Non-Protein Calories: 1214kcal/day (22cal/Kg)  Dextrose Infusion Rate: 2.7 mg/Kg/min  Lipid Infusion Rate: 0.92 Gm/Kg/day; 0.08 Gm/Kg/hr    Last BM: none, no flatus, +belching. Rx for Lomotil, Imodium, opium tincture, metamucil powder, and octreotide discontinued.     NGT output x 24 hours: 200ml (), 0ml ()    Urine output x 24-hours: 1295ml    Daily Weight in k.5 (-), Weight in k.5 (), Weight in k.8 (), Weight in k.5 (), Weight in k.1 (-), Weight in k.2 (), Weight in k.5 ()    Drug Dosing Weight  Weight (kg): 54 (2020 08:35)  BMI (kg/m2): 17.6 (2020 08:35)    Pertinent Medications: MEDICATIONS  (STANDING):  acetaminophen  IVPB .. 1000 milliGRAM(s) IV Intermittent every 6 hours  albuterol/ipratropium for Nebulization. 3 milliLiter(s) Nebulizer every 6 hours  buDESOnide    Inhalation Suspension 0.5 milliGRAM(s) Inhalation every 12 hours  chlorhexidine 2% Cloths 1 Application(s) Topical <User Schedule>  doxazosin 2 milliGRAM(s) Oral at bedtime  enoxaparin Injectable 40 milliGRAM(s) SubCutaneous daily  ergocalciferol 59626 Unit(s) Oral every week  fat emulsion (Fish Oil and Plant Based) 20% Infusion 20.8 mL/Hr (20.8 mL/Hr) IV Continuous <Continuous>  magnesium sulfate  IVPB 2 Gram(s) IV Intermittent once  nystatin/triamcinolone Cream 1 Application(s) Topical two times a day  pantoprazole  Injectable 40 milliGRAM(s) IV Push daily  Parenteral Nutrition - Adult 1 Each (83 mL/Hr) TPN Continuous <Continuous>  triamcinolone 0.1% Ointment 1 Application(s) Topical two times a day    MEDICATIONS  (PRN):  benzocaine 15 mG/menthol 3.6 mG (Sugar-Free) Lozenge 1 Lozenge Oral every 4 hours PRN Sore Throat  HYDROmorphone  Injectable 0.5 milliGRAM(s) IV Push every 3 hours PRN Severe Pain (7 - 10)    LABS:    @ 03:58: Sodium 137, Potassium 3.7, Chloride 100, Calcium 8.0<L>, Magnesium 1.8, Phosphorus 2.9, BUN 26<H>, Creatinine 0.43<L>, <H>, Alk Phos 75, ALT/SGPT 26, AST/SGOT 21, HbA1c --, Total Protein 5.6<L>, Albumin 2.4<L>, Total Bilirubin 1.1, Direct Bilirubin 0.6<H>, Hemoglobin 6.7<LL>, Hematocrit 21.3<L>    Triglycerides, Serum: 53 mg/dL (20 @ 21:38)  Triglycerides, Serum: 59 mg/dL (20 @ 00:24)  Triglycerides, Serum: 61 mg/dL (20 @ 01:41)  Triglycerides, Serum: 51 mg/dL (20 @ 04:10)  Triglycerides, Serum: 83 mg/dL (20 @ 00:14)  Triglycerides, Serum: 78 mg/dL (20 @ 00:18)  Triglycerides, Serum: 76 mg/dL (20 @ 00:26)  Triglycerides, Serum: 51 mg/dL (01-15-20 @ 01:44)  Triglycerides, Serum: 60 mg/dL (20 @ 03:31)    Skin per nursing documentation: no pressure injuries documented  Edema: none noted    Estimated Needs: based on dosing wt 54.2Kg, with consideration for TPN, malnutrition  5647-9916 gregorio/day (25-30cal/Kg)   Gm protein/day (1.8-2.2Gm/Kg)     Previous Nutrition Diagnosis: Severe malnutrition  Nutrition Diagnosis is: ongoing, being addressed with TPN at full goal post-op    New Nutrition Diagnosis: none     Interventions: Continue TPN at full goal; monitor GI function    Recommend  1) TPN per TPN Team/Nutrition assessment; continue at full goal post-op  2) Monitor GI function; advance to clear liquids as tolerated  3) When diet is advanced, add 3 servings apple Ensure Clear (240cal, 8Gm protein per serving)  4) When diet is advanced, add 3 servings chocolate Ensure Enlive (350cal, 20Gm protein per 8oz serving)   5) When diet is advanced, add 1 Prosource (60cal, 15Gm protein)    Monitoring and Evaluation:     Continue to monitor nutrition provision and tolerance, weights, labs, skin integrity.    RD remains available upon request and will follow up per protocol.    Sowmya Graham MS RD CDN [facility], Pager # [phone]. Nutrition Follow Up Note.    Patient seen for: malnutrition/TPN Team follow up    Source: patient, medical record, TPN Team rounds     Chart reviewed, events noted. Pt now S/P ileostomy reversal (2020). NGT to low wall suction.    Nutrition Status: Severe Malnutrition. Pt with 150 cm small bowel remaining after GI surgery x 3. TPN initiated . Pt was tolerating  po diet since . TPN discontinued as of 1/15; resumed at half goal . TPN increased to full dose on  to optimize nutrition prior to ileostomy reversal on . Pt now NPO with NGT to suction on POD #2; clamping trial in progress. TPN infusing at full goal. No GI function post-op thus far.    TPN Adjustments:   Volume previously increased to 2L on  to compensate for high fluid output, and NaCl increased to 80mEq. Plan to continue 60 mmol NaPhos.    Pt required potassium supplementation; plan to increase KCL in TPN today from 40 mEq to 80mEq.   Calcium gluconate previously increased to 14mEq; ionized Calcium now 1.14 <WNL>.   MgSO4 previously increased to 14mEq.  Pt required magnesium sulfate repletion; plan to increase MgSo4 in TPN today to 16mEq.    Diet: NPO    Parenteral Nutrition: (): 2L infusing at 83ml/hr (120Gm amino acids, 210Gm dextrose, 50Gm SMOF lipids); to provide: 1694cal (31Kcal/Kg and 2.2Gm protein/Kg dosing wt 54.2Kg); with 10ml MVI and 3ml MTE-5.     Non-Protein Calories: 1214kcal/day (22cal/Kg)  Dextrose Infusion Rate: 2.7 mg/Kg/min  Lipid Infusion Rate: 0.92 Gm/Kg/day; 0.08 Gm/Kg/hr    Last BM: none, no flatus, +belching. Rx for Lomotil, Imodium, opium tincture, metamucil powder, and octreotide discontinued.     NGT output x 24 hours: 200ml (), 0ml ()    Urine output x 24-hours: 1295ml    Daily Weight in k.5 (-), Weight in k.5 (-), Weight in k.8 (-), Weight in k.5 (-), Weight in k.1 (-), Weight in k.2 (), Weight in k.5 ()    Drug Dosing Weight  Weight (kg): 54 (2020 08:35)  BMI (kg/m2): 17.6 (2020 08:35)    Pertinent Medications: MEDICATIONS  (STANDING):  acetaminophen  IVPB .. 1000 milliGRAM(s) IV Intermittent every 6 hours  albuterol/ipratropium for Nebulization. 3 milliLiter(s) Nebulizer every 6 hours  buDESOnide    Inhalation Suspension 0.5 milliGRAM(s) Inhalation every 12 hours  chlorhexidine 2% Cloths 1 Application(s) Topical <User Schedule>  doxazosin 2 milliGRAM(s) Oral at bedtime  enoxaparin Injectable 40 milliGRAM(s) SubCutaneous daily  ergocalciferol 18590 Unit(s) Oral every week  fat emulsion (Fish Oil and Plant Based) 20% Infusion 20.8 mL/Hr (20.8 mL/Hr) IV Continuous <Continuous>  magnesium sulfate  IVPB 2 Gram(s) IV Intermittent once  nystatin/triamcinolone Cream 1 Application(s) Topical two times a day  pantoprazole  Injectable 40 milliGRAM(s) IV Push daily  Parenteral Nutrition - Adult 1 Each (83 mL/Hr) TPN Continuous <Continuous>  triamcinolone 0.1% Ointment 1 Application(s) Topical two times a day    MEDICATIONS  (PRN):  benzocaine 15 mG/menthol 3.6 mG (Sugar-Free) Lozenge 1 Lozenge Oral every 4 hours PRN Sore Throat  HYDROmorphone  Injectable 0.5 milliGRAM(s) IV Push every 3 hours PRN Severe Pain (7 - 10)    LABS:    @ 03:58: Sodium 137, Potassium 3.7, Chloride 100, Calcium 8.0<L>, Magnesium 1.8, Phosphorus 2.9, BUN 26<H>, Creatinine 0.43<L>, <H>, Alk Phos 75, ALT/SGPT 26, AST/SGOT 21, HbA1c --, Total Protein 5.6<L>, Albumin 2.4<L>, Total Bilirubin 1.1, Direct Bilirubin 0.6<H>, Hemoglobin 6.7<LL>, Hematocrit 21.3<L>    Triglycerides, Serum: 53 mg/dL (20 @ 21:38)  Triglycerides, Serum: 59 mg/dL (20 @ 00:24)  Triglycerides, Serum: 61 mg/dL (20 @ 01:41)  Triglycerides, Serum: 51 mg/dL (20 @ 04:10)  Triglycerides, Serum: 83 mg/dL (20 @ 00:14)  Triglycerides, Serum: 78 mg/dL (20 @ 00:18)  Triglycerides, Serum: 76 mg/dL (20 @ 00:26)  Triglycerides, Serum: 51 mg/dL (01-15-20 @ 01:44)  Triglycerides, Serum: 60 mg/dL (20 @ 03:31)    Skin per nursing documentation: no pressure injuries documented  Edema: none noted    Estimated Needs: based on dosing wt 54.2Kg, with consideration for TPN, malnutrition  2619-9031 gregorio/day (25-30cal/Kg)   Gm protein/day (1.8-2.2Gm/Kg)     Previous Nutrition Diagnosis: Severe malnutrition  Nutrition Diagnosis is: ongoing, being addressed with TPN at full goal post-op    New Nutrition Diagnosis: none     Interventions: Continue TPN at full goal; monitor GI function    Recommend  1) TPN per TPN Team/Nutrition assessment; continue at full goal post-op  2) Monitor GI function; advance to clear liquids as tolerated  3) When diet is advanced, add 3 servings apple Ensure Clear (240cal, 8Gm protein per serving)  4) When diet is advanced, add 3 servings chocolate Ensure Enlive (350cal, 20Gm protein per 8oz serving)   5) When diet is advanced, add 1 Prosource (60cal, 15Gm protein)  6) When diet is advanced, resume chocolate Mighty Shake (200 calories, 6 Gm protein) supplements tid    Monitoring and Evaluation:     Continue to monitor nutrition provision and tolerance, weights, labs, skin integrity.    RD remains available upon request and will follow up per protocol.    Sowmya Graham, MS RD CDN [facility], [place], Pager # [phone].

## 2020-02-06 NOTE — PROGRESS NOTE ADULT - SUBJECTIVE AND OBJECTIVE BOX
24 HOUR EVENTS:  - Discontinued octreotide  - IV locked  - Unable to give Flomax through NG tube so changed to doxazosin  - Martinez discontinued and passed trial of void  - Bilateral LE Duplex negative for DVTs  - Dermatology consult obtained for persistent back rash determined to be irritated contact dermatitis so started triamcinolone 0.1% ointment BID on affected areas    HISTORY:  76 y/o male with a past medical history of COPD and EtOH dependence who presented on 12/6/2019 with abdominal pain, nausea, hematemesis, and poor PO intake for ~2-3 days. In the ED, he was found to be hypotensive & tachycardic. Labs revealed an CHI and lactic acidosis. Imaging revealed a high grade SBO in the RLQ on CT scan. Hospital course is as follows:  12/06 - s/p exploratory laparotomy, lysis of adhesions, decompression of bowel via enterotomy w/ primary repair, and Abthera VAC placement as the distal 50% of the bowel appeared dusky but was still viable, admitted to SICU post-operatively as he was on vasopressor support and was left intubated  12/08 - s/p re-exploration, proximal 165 cm of small bowel appeared pink & viable but beyond that had patchy areas of ischemia so decision was made to give the bowel more time to demarcate before resecting in order to preserve as much small bowel as possible so Abthera VAC was replaced  12/09 - s/p re-exploration, small bowel resection of 150 cm (150 cm remaining), ileocecetomy, end ileostomy, mucous fistula, and abdominal closure  12/11 - extubated to BiPAP, noted to be in SVT that was rate controlled w/ metoprolol  12/14 - started on TPN  12/15 - noted to have spontaneous left pneumothorax s/p left pigtail catheter placement, noted to be in SVT again so amiodarone started, high ileostomy output noted so concern for short bowel syndrome  12/16 - amiodarone discontinued, started on beta blocker with metoprolol  12/18 - started Lomotil and ileostomy repletions with IV fluids  12/19 - started Imodium, left pigtail catheter was placed to water seal but pneumothorax noted on follow-up CXR so placed back to suction  12/20 - started tincture of opium, concern for aspiration so changed diet from regular to dysphagia 1 pureed with nectar-thickened liquids  12/22 - left pigtail catheter placed to water seal with no pneumothorax noted on follow-up CXR, CT chest with moderate left pleural effusion not being drained by pigtail catheter  12/23 - 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  12/24 - left pigtail catheter discontinued  12/28 - started Ancef for erythematous midline wound  12/29 - beta blocker discontinued for intermittent episodes of hypotension  12/30 - left pigtail catheter placement by IR with drainage of serous transudative fluid  01/02 - FEES demonstrating penetration with thin liquids so patient kept on dysphagia 1 pureed with nectar-thickened liquids  01/07 - transferred to floors  01/10 - s/p left VATS w/ PleurX catheter placement  01/11 - evaluated by speech & swallow, advanced diet to mechanical soft with thin liquids  01/12 - PleurX catheter placed to water seal with on pneumothorax on follow-up CXR  01/13 - RRT for hypoxemia, placed on BiPAP, PleurX catheter placed back to suction, started vancomycin & Zosyn for possible HCAP  01/14 - noted to have minimal output from PleurX catheter, lung ultrasound with large left pleural effusion concerning for hemothorax, multiple episodes of bradycardia secondary to hypoxemia, remains on BiPAP  01/15 - worsening respiratory distress requiring intubation, hypotensive requiring vasopressor support, CT chest with large left hemothorax w/ trace pneumothorax & extensive subcutaneous emphysema so taken to OR emergently for left VATS, evacuation fo 2 L of clot, and placement of two left chest tubes  01/16 - extubated, weaned off vasopressor support  01/17 - two left chest tubes placed to water seal with no pneumothorax noted on follow-up CXR  01/22 - inferior chest tube discontinued with no pneumothorax noted on follow-up CXR, polyuria noted  01/24 - given DDAVP for polyuria w/ good response  01/25 - superior chest tube discontinued with no pneumothorax noted on follow-up CXR  02/04 - s/p exploratory laparotomy, lysis of adhesions, and ileostomy reversal 24 HOUR EVENTS:  - Discontinued octreotide  - IV locked  - Unable to give Flomax through NG tube so changed to doxazosin  - Martinez discontinued and passed trial of void  - Bilateral LE Duplex negative for DVTs  - Dermatology consult obtained for persistent back rash determined to be irritated contact dermatitis so started triamcinolone 0.1% ointment BID on affected areas    HISTORY:  74 y/o male with a past medical history of COPD and EtOH dependence who presented on 12/6/2019 with abdominal pain, nausea, hematemesis, and poor PO intake for ~2-3 days. In the ED, he was found to be hypotensive & tachycardic. Labs revealed an CHI and lactic acidosis. Imaging revealed a high grade SBO in the RLQ on CT scan. Hospital course is as follows:  12/06 - s/p exploratory laparotomy, lysis of adhesions, decompression of bowel via enterotomy w/ primary repair, and Abthera VAC placement as the distal 50% of the bowel appeared dusky but was still viable, admitted to SICU post-operatively as he was on vasopressor support and was left intubated  12/08 - s/p re-exploration, proximal 165 cm of small bowel appeared pink & viable but beyond that had patchy areas of ischemia so decision was made to give the bowel more time to demarcate before resecting in order to preserve as much small bowel as possible so Abthera VAC was replaced  12/09 - s/p re-exploration, small bowel resection of 150 cm (150 cm remaining), ileocecetomy, end ileostomy, mucous fistula, and abdominal closure  12/11 - extubated to BiPAP, noted to be in SVT that was rate controlled w/ metoprolol  12/14 - started on TPN  12/15 - noted to have spontaneous left pneumothorax s/p left pigtail catheter placement, noted to be in SVT again so amiodarone started, high ileostomy output noted so concern for short bowel syndrome  12/16 - amiodarone discontinued, started on beta blocker with metoprolol  12/18 - started Lomotil and ileostomy repletions with IV fluids  12/19 - started Imodium, left pigtail catheter was placed to water seal but pneumothorax noted on follow-up CXR so placed back to suction  12/20 - started tincture of opium, concern for aspiration so changed diet from regular to dysphagia 1 pureed with nectar-thickened liquids  12/22 - left pigtail catheter placed to water seal with no pneumothorax noted on follow-up CXR, CT chest with moderate left pleural effusion not being drained by pigtail catheter  12/23 - 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  12/24 - left pigtail catheter discontinued  12/28 - started Ancef for erythematous midline wound  12/29 - beta blocker discontinued for intermittent episodes of hypotension  12/30 - left pigtail catheter placement by IR with drainage of serous transudative fluid  01/02 - FEES demonstrating penetration with thin liquids so patient kept on dysphagia 1 pureed with nectar-thickened liquids  01/07 - transferred to floors  01/10 - s/p left VATS w/ PleurX catheter placement  01/11 - evaluated by speech & swallow, advanced diet to mechanical soft with thin liquids  01/12 - PleurX catheter placed to water seal with on pneumothorax on follow-up CXR  01/13 - RRT for hypoxemia, placed on BiPAP, PleurX catheter placed back to suction, started vancomycin & Zosyn for possible HCAP  01/14 - noted to have minimal output from PleurX catheter, lung ultrasound with large left pleural effusion concerning for hemothorax, multiple episodes of bradycardia secondary to hypoxemia, remains on BiPAP  01/15 - worsening respiratory distress requiring intubation, hypotensive requiring vasopressor support, CT chest with large left hemothorax w/ trace pneumothorax & extensive subcutaneous emphysema so taken to OR emergently for left VATS, evacuation fo 2 L of clot, and placement of two left chest tubes  01/16 - extubated, weaned off vasopressor support  01/17 - two left chest tubes placed to water seal with no pneumothorax noted on follow-up CXR  01/22 - inferior chest tube discontinued with no pneumothorax noted on follow-up CXR, polyuria noted  01/24 - given DDAVP for polyuria w/ good response  01/25 - superior chest tube discontinued with no pneumothorax noted on follow-up CXR  02/04 - s/p exploratory laparotomy, lysis of adhesions, and ileostomy reversal    SUBJECTIVE/ROS:  [x] A ten-point review of systems was otherwise negative except as noted.  [ ] Due to altered mental status/intubation, subjective information were not able to be obtained from the patient. History was obtained, to the extent possible, from review of the chart and collateral sources of information.    NEURO  Exam: awake, alert, oriented x4, no acute distress, no acute focal deficits  Meds:  - acetaminophen  IVPB .. 1000 milliGRAM(s) IV Intermittent every 6 hours  - HYDROmorphone  Injectable 0.5 milliGRAM(s) IV Push every 3 hours PRN Severe Pain (7 - 10)  [x] Adequacy of sedation and pain control has been assessed and adjusted    RESPIRATORY  RR: 22 (02-06-20 @ 05:00) (14 - 29)  SpO2: 95% (02-06-20 @ 05:00) (94% - 100%)  Exam: clear to auscultation bilaterally  Mechanical Ventilation: no  [N/A] Extubation Readiness Assessed  Meds:  - albuterol/ipratropium for Nebulization. 3 milliLiter(s) Nebulizer every 6 hours  - buDESOnide    Inhalation Suspension 0.5 milliGRAM(s) Inhalation every 12 hours    CARDIOVASCULAR  HR: 97 (02-06-20 @ 05:00) (81 - 104)  BP: 110/56 (02-06-20 @ 05:00) (99/55 - 137/62)  BP(mean): 80 (02-06-20 @ 05:00) (72 - 89)  Exam: regular rate and rhythm, S1S2  Cardiac Rhythm: sinus  Perfusion    [x]Adequate    [ ]Inadequate  Mentation   [x]Normal       [ ]Reduced  Extremities  [x]Warm         [ ]Cool  Volume Status [ ]Hypervolemic [x]Euvolemic [ ]Hypovolemic  Meds: none    GI/NUTRITION  Exam: soft, nondistended, sawyer-incisional tenderness, incision dressing C/D/I  Diet:  Meds: pantoprazole  Injectable 40 milliGRAM(s) IV Push daily      GENITOURINARY  I&O's Detail    02-04 @ 07:01  -  02-05 @ 07:00  --------------------------------------------------------  IN:    fat emulsion (Fish Oil and Plant Based) 20% Infusion: 270.4 mL    lactated ringers.: 750 mL    Solution: 50 mL    Solution: 300 mL    TPN (Total Parenteral Nutrition): 1992 mL  Total IN: 3362.4 mL    OUT:    Ileostomy: 60 mL    Indwelling Catheter - Urethral: 1450 mL    Nasoenteral Tube: 200 mL    Voided: 100 mL  Total OUT: 1810 mL    Total NET: 1552.4 mL      02-05 @ 07:01  -  02-06 @ 05:37  --------------------------------------------------------  IN:    fat emulsion (Fish Oil and Plant Based) 20% Infusion: 270.4 mL    Solution: 200 mL    Solution: 100 mL    TPN (Total Parenteral Nutrition): 1826 mL  Total IN: 2396.4 mL    OUT:    Indwelling Catheter - Urethral: 595 mL    Voided: 400 mL  Total OUT: 995 mL    Total NET: 1401.4 mL          02-06    137  |  100  |  26<H>  ----------------------------<  116<H>  3.7   |  27  |  0.43<L>    Ca    8.0<L>      06 Feb 2020 03:58  Phos  2.9     02-06  Mg     1.8     02-06    TPro  5.6<L>  /  Alb  2.4<L>  /  TBili  1.1  /  DBili  0.6<H>  /  AST  21  /  ALT  26  /  AlkPhos  75  02-06    [ ] Martinez catheter, indication:   Meds: ergocalciferol 61688 Unit(s) Oral every week  fat emulsion (Fish Oil and Plant Based) 20% Infusion 20.8 mL/Hr IV Continuous <Continuous>  magnesium sulfate  IVPB 2 Gram(s) IV Intermittent once  Parenteral Nutrition - Adult 1 Each TPN Continuous <Continuous>  potassium chloride  20 mEq/100 mL IVPB 20 milliEquivalent(s) IV Intermittent every 2 hours  doxazosin 2 milliGRAM(s) Oral at bedtime      HEMATOLOGIC  Meds: enoxaparin Injectable 40 milliGRAM(s) SubCutaneous daily    [x] VTE Prophylaxis                        6.7    13.58 )-----------( 278      ( 06 Feb 2020 03:58 )             21.3       Transfusion     [ ] PRBC   [ ] Platelets   [ ] FFP   [ ] Cryoprecipitate      INFECTIOUS DISEASES  T(C): 36.7 (02-06-20 @ 03:00), Max: 37.3 (02-05-20 @ 11:00)  Wt(kg): --  WBC Count: 13.58 K/uL (02-06 @ 03:58)    Recent Cultures:    Meds:       ENDOCRINE  Capillary Blood Glucose    Meds:       ACCESS DEVICES:  [ ] Peripheral IV  [ ] Central Venous Line	[ ] R	[ ] L	[ ] IJ	[ ] Fem	[ ] SC	Placed:   [ ] Arterial Line		[ ] R	[ ] L	[ ] Fem	[ ] Rad	[ ] Ax	Placed:   [ ] PICC:					[ ] Mediport  [ ] Urinary Catheter, Date Placed:   [ ] Necessity of urinary, arterial, and venous catheters discussed    OTHER MEDICATIONS:  benzocaine 15 mG/menthol 3.6 mG (Sugar-Free) Lozenge 1 Lozenge Oral every 4 hours PRN  chlorhexidine 2% Cloths 1 Application(s) Topical <User Schedule>  nystatin/triamcinolone Cream 1 Application(s) Topical two times a day  triamcinolone 0.1% Ointment 1 Application(s) Topical two times a day      CODE STATUS:     IMAGING: 24 HOUR EVENTS:  - Discontinued octreotide  - IV locked  - Unable to give Flomax through NG tube so changed to doxazosin  - Martinez discontinued and passed trial of void  - Bilateral LE Duplex negative for DVTs  - Dermatology consult obtained for persistent back rash determined to be irritated contact dermatitis so started triamcinolone 0.1% ointment BID on affected areas    HISTORY:  74 y/o male with a past medical history of COPD and EtOH dependence who presented on 12/6/2019 with abdominal pain, nausea, hematemesis, and poor PO intake for ~2-3 days. In the ED, he was found to be hypotensive & tachycardic. Labs revealed an CHI and lactic acidosis. Imaging revealed a high grade SBO in the RLQ on CT scan. Hospital course is as follows:  12/06 - s/p exploratory laparotomy, lysis of adhesions, decompression of bowel via enterotomy w/ primary repair, and Abthera VAC placement as the distal 50% of the bowel appeared dusky but was still viable, admitted to SICU post-operatively as he was on vasopressor support and was left intubated  12/08 - s/p re-exploration, proximal 165 cm of small bowel appeared pink & viable but beyond that had patchy areas of ischemia so decision was made to give the bowel more time to demarcate before resecting in order to preserve as much small bowel as possible so Abthera VAC was replaced  12/09 - s/p re-exploration, small bowel resection of 150 cm (150 cm remaining), ileocecetomy, end ileostomy, mucous fistula, and abdominal closure  12/11 - extubated to BiPAP, noted to be in SVT that was rate controlled w/ metoprolol  12/14 - started on TPN  12/15 - noted to have spontaneous left pneumothorax s/p left pigtail catheter placement, noted to be in SVT again so amiodarone started, high ileostomy output noted so concern for short bowel syndrome  12/16 - amiodarone discontinued, started on beta blocker with metoprolol  12/18 - started Lomotil and ileostomy repletions with IV fluids  12/19 - started Imodium, left pigtail catheter was placed to water seal but pneumothorax noted on follow-up CXR so placed back to suction  12/20 - started tincture of opium, concern for aspiration so changed diet from regular to dysphagia 1 pureed with nectar-thickened liquids  12/22 - left pigtail catheter placed to water seal with no pneumothorax noted on follow-up CXR, CT chest with moderate left pleural effusion not being drained by pigtail catheter  12/23 - 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  12/24 - left pigtail catheter discontinued  12/28 - started Ancef for erythematous midline wound  12/29 - beta blocker discontinued for intermittent episodes of hypotension  12/30 - left pigtail catheter placement by IR with drainage of serous transudative fluid  01/02 - FEES demonstrating penetration with thin liquids so patient kept on dysphagia 1 pureed with nectar-thickened liquids  01/07 - transferred to floors  01/10 - s/p left VATS w/ PleurX catheter placement  01/11 - evaluated by speech & swallow, advanced diet to mechanical soft with thin liquids  01/12 - PleurX catheter placed to water seal with on pneumothorax on follow-up CXR  01/13 - RRT for hypoxemia, placed on BiPAP, PleurX catheter placed back to suction, started vancomycin & Zosyn for possible HCAP  01/14 - noted to have minimal output from PleurX catheter, lung ultrasound with large left pleural effusion concerning for hemothorax, multiple episodes of bradycardia secondary to hypoxemia, remains on BiPAP  01/15 - worsening respiratory distress requiring intubation, hypotensive requiring vasopressor support, CT chest with large left hemothorax w/ trace pneumothorax & extensive subcutaneous emphysema so taken to OR emergently for left VATS, evacuation fo 2 L of clot, and placement of two left chest tubes  01/16 - extubated, weaned off vasopressor support  01/17 - two left chest tubes placed to water seal with no pneumothorax noted on follow-up CXR  01/22 - inferior chest tube discontinued with no pneumothorax noted on follow-up CXR, polyuria noted  01/24 - given DDAVP for polyuria w/ good response  01/25 - superior chest tube discontinued with no pneumothorax noted on follow-up CXR  02/04 - s/p exploratory laparotomy, lysis of adhesions, and ileostomy reversal    SUBJECTIVE/ROS:  [x] A ten-point review of systems was otherwise negative except as noted.  [ ] Due to altered mental status/intubation, subjective information were not able to be obtained from the patient. History was obtained, to the extent possible, from review of the chart and collateral sources of information.    NEURO  Exam: awake, alert, oriented x4, no acute distress, no acute focal deficits  Meds:  - acetaminophen  IVPB .. 1000 milliGRAM(s) IV Intermittent every 6 hours  - HYDROmorphone  Injectable 0.5 milliGRAM(s) IV Push every 3 hours PRN Severe Pain (7 - 10)  [x] Adequacy of sedation and pain control has been assessed and adjusted    RESPIRATORY  RR: 22 (02-06-20 @ 05:00) (14 - 29)  SpO2: 95% (02-06-20 @ 05:00) (94% - 100%)  Exam: clear to auscultation bilaterally  Mechanical Ventilation: no  [N/A] Extubation Readiness Assessed  Meds:  - albuterol/ipratropium for Nebulization. 3 milliLiter(s) Nebulizer every 6 hours  - buDESOnide    Inhalation Suspension 0.5 milliGRAM(s) Inhalation every 12 hours    CARDIOVASCULAR  HR: 97 (02-06-20 @ 05:00) (81 - 104)  BP: 110/56 (02-06-20 @ 05:00) (99/55 - 137/62)  BP(mean): 80 (02-06-20 @ 05:00) (72 - 89)  Exam: regular rate and rhythm, S1S2  Cardiac Rhythm: sinus  Perfusion    [x]Adequate    [ ]Inadequate  Mentation   [x]Normal       [ ]Reduced  Extremities  [x]Warm         [ ]Cool  Volume Status [ ]Hypervolemic [x]Euvolemic [ ]Hypovolemic  Meds: none    GI/NUTRITION  Exam: soft, nondistended, sawyer-incisional tenderness, incision dressing C/D/I  Diet: NPO with NG tube to continuous low wall suction  Meds:  - fat emulsion (Fish Oil and Plant Based) 20% Infusion 20.8 mL/Hr IV Continuous <Continuous>  - pantoprazole  Injectable 40 milliGRAM(s) IV Push daily  - Parenteral Nutrition - Adult 1 Each TPN Continuous <Continuous>    GENITOURINARY  I&O's Detail  02-05 @ 07:01  -  02-06 @ 05:37  --------------------------------------------------------  IN:    fat emulsion (Fish Oil and Plant Based) 20% Infusion: 270.4 mL    Solution: 200 mL    Solution: 100 mL    TPN (Total Parenteral Nutrition): 1826 mL  Total IN: 2396.4 mL    OUT:    Indwelling Catheter - Urethral: 595 mL    Voided: 400 mL  Total OUT: 995 mL    Total NET: 1401.4 mL    137  |  100  |  26<H>  ----------------------------<  116<H>  3.7   |  27  |  0.43<L>    Ca    8.0<L>      06 Feb 2020 03:58  Phos  2.9  Mg     1.8  TPro  5.6<L>  /  Alb  2.4<L>  /  TBili  1.1  /  DBili  0.6<H>  /  AST  21  /  ALT  26  /  AlkPhos  75  02-06    [ ] Martinez catheter, indication:   Meds: doxazosin 2 milliGRAM(s) Oral at bedtime    HEMATOLOGIC  Meds: enoxaparin Injectable 40 milliGRAM(s) SubCutaneous daily  [x] VTE Prophylaxis                        6.7    13.58 )-----------( 278      ( 06 Feb 2020 03:58 )             21.3     INFECTIOUS DISEASES  T(C): 36.7 (02-06-20 @ 03:00), Max: 37.3 (02-05-20 @ 11:00)  WBC Count: 13.58 K/uL (02-06 @ 03:58)  Recent Cultures: none  Meds: none    ENDOCRINE  Capillary Blood Glucose: none  Meds: none    ACCESS DEVICES:  [x] Peripheral IV  [ ] Central Venous Line	[ ] R	[ ] L	[ ] IJ	[ ] Fem	[ ] SC	Placed:   [ ] Arterial Line		[ ] R	[ ] L	[ ] Fem	[ ] Rad	[ ] Ax	Placed:   [x] PICC:	RUE placed on 1/21			[ ] Mediport  [ ] Urinary Catheter, Date Placed:   [x] Necessity of urinary, arterial, and venous catheters discussed    OTHER MEDICATIONS:  benzocaine 15 mG/menthol 3.6 mG (Sugar-Free) Lozenge 1 Lozenge Oral every 4 hours PRN  chlorhexidine 2% Cloths 1 Application(s) Topical <User Schedule>  ergocalciferol 18899 Unit(s) Oral every week  nystatin/triamcinolone Cream 1 Application(s) Topical two times a day  triamcinolone 0.1% Ointment 1 Application(s) Topical two times a day    CODE STATUS: Full code    IMAGING:

## 2020-02-06 NOTE — PROGRESS NOTE ADULT - ASSESSMENT
76 y/o M presenting with septic shock and high grade SBO s/p exploratory laparotomy, lysis of adhesions, decompression of bowel via enterotomy w/ primary repair, and Abthera VAC placement on 12/6; s/p take down of Abthera, washout and re-application of the Abthera vac on 12/8. 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 s/p L chest tube placement by IR 12/30 for pleural effusion now s/p VATS, with chest tube insertion for drainage of pleural effusion. RRT called 1/13 AM for hypoxia, patient transferred back to SICU.  Patient continued SOB, chest drain possible obstruction expanding. Patent taken back to OR emergently 1/15 for clot evac and VATS. Patient is now s/p Ileostomy reversal 2/4/20.    PLAN:  - Clamp trial  - f/u labs  - cont npo, ivf, tpn, NGT LCWS  - OOB to chair  - await return of GI fxn   - Appreciate continued SICU care    [facility]  [id number]

## 2020-02-06 NOTE — PROGRESS NOTE ADULT - SUBJECTIVE AND OBJECTIVE BOX
[facility] NUTRITION SUPPORT / TPN -- FOLLOW UP NOTE  --------------------------------------------------------------------------------    24 hour events/subjective:  Pt currently NPO/NGT POD#2  Pain appropriate - relief w/ pain Rx     pt didn't sleep well last pm  No n/v  No cough/ cp/ palp/dyspnea/ sob  No f/c/s    Diet:  Diet, NPO (02-04-20 @ 14:10)      Appetite: [x  ]Poor [  ]Adequate [  ]Good  Caloric intake:  [ x  ]  Adequate   [   ] Inadequate    ROS: General/ GI see HPI  all other systems negative      ALLERGIES & MEDICATIONS  --------------------------------------------------------------------------------  ALLERGIES  IV Contrast (Hives)    STANDING INPATIENT MEDICATIONS    acetaminophen  IVPB .. 1000 milliGRAM(s) IV Intermittent every 6 hours  albuterol/ipratropium for Nebulization. 3 milliLiter(s) Nebulizer every 6 hours  buDESOnide    Inhalation Suspension 0.5 milliGRAM(s) Inhalation every 12 hours  chlorhexidine 2% Cloths 1 Application(s) Topical <User Schedule>  doxazosin 2 milliGRAM(s) Oral at bedtime  enoxaparin Injectable 40 milliGRAM(s) SubCutaneous daily  ergocalciferol 45439 Unit(s) Oral every week  fat emulsion (Fish Oil and Plant Based) 20% Infusion 20.8 mL/Hr IV Continuous <Continuous>  nystatin/triamcinolone Cream 1 Application(s) Topical two times a day  pantoprazole  Injectable 40 milliGRAM(s) IV Push daily  Parenteral Nutrition - Adult 1 Each TPN Continuous <Continuous>  triamcinolone 0.1% Ointment 1 Application(s) Topical two times a day      PRN INPATIENT MEDICATION  benzocaine 15 mG/menthol 3.6 mG (Sugar-Free) Lozenge 1 Lozenge Oral every 4 hours PRN  HYDROmorphone  Injectable 0.5 milliGRAM(s) IV Push every 3 hours PRN        VITALS/PHYSICAL EXAM  --------------------------------------------------------------------------------  T(C): 36.7 (02-06-20 @ 03:00), Max: 37.3 (02-05-20 @ 11:00)  HR: 102 (02-06-20 @ 10:00) (81 - 104)  BP: 109/57 (02-06-20 @ 10:00) (97/55 - 137/62)  RR: 19 (02-06-20 @ 10:00) (14 - 29)  SpO2: 96% (02-06-20 @ 10:00) (94% - 100%)  Wt(kg): --        02-05-20 @ 07:01  -  02-06-20 @ 07:00  --------------------------------------------------------  IN: 3212.4 mL / OUT: 1295 mL / NET: 1917.4 mL    02-06-20 @ 07:01  -  02-06-20 @ 10:16  --------------------------------------------------------  IN: 299 mL / OUT: 250 mL / NET: 49 mL    PHYSICAL EXAM:  --------------------------------------------------------------------------------  	Gen: guarded but stable, A&Ox3, NC O2, NGT  	HEENT: NC/AT, PERRL, supple neck, trachea midline, mucosa moist              GI: (+) BS, softly distended, appropriately tender                    midline dressing c/d/i                        Rt sided  ostomy dressing c/d/i              MSK: FROM x4, no contractures  	Vascular: Equally Warm,  no edema,  no clubbing, cyanosis,                      RUE PICC w/o sx infection   	Neuro: No focal deficits, intact sensation, weakened strength BLE>BUE  	Psych: Normal affect and mood              skin: moist, erythematous pinpoint rash chest/ back, groin        LABS/ CULTURES/ RADIOLOGY:              7.8    13.55 >-----------<  287      [02-06-20 @ 08:55]              24.4     137  |  100  |  26  ----------------------------<  116      [02-06-20 @ 03:58]  3.7   |  27  |  0.43        Ca     8.0     [02-06-20 @ 03:58]      iCa    1.14     [02-06 @ 04:07]      Mg     1.8     [02-06-20 @ 03:58]      Phos  2.9     [02-06-20 @ 03:58]    TPro  5.6  /  Alb  2.4  /  TBili  1.1  /  DBili  0.6  /  AST  21  /  ALT  26  /  AlkPhos  75  [02-06-20 @ 03:58]      Blood Gas Calcium, Ionized - Venous: 1.28 mmoL/L (02-04-20 @ 13:20)  Blood Gas Calcium, Ionized - Venous: 0.96 mmoL/L (02-04-20 @ 12:50)  Blood Gas Calcium, Ionized - Venous: 1.24 mmoL/L (02-04-20 @ 12:28)    Prealbumin, Serum: 21 mg/dL (02-03-20 @ 23:50)  Prealbumin, Serum: 19 mg/dL (02-03-20 @ 07:18)  Prealbumin, Serum: 18 mg/dL (02-02-20 @ 09:48)  Prealbumin, Serum: 18 mg/dL (01-31-20 @ 07:40)  Prealbumin, Serum: 19 mg/dL (01-27-20 @ 04:52) [facility] NUTRITION SUPPORT / TPN -- FOLLOW UP NOTE  --------------------------------------------------------------------------------    24 hour events/subjective:  Pt currently NPO/NGT POD#2     clamping trial today w/ possible removal of NGT  Pain appropriate - relief w/ pain Rx     pt didn't sleep well last pm  Martinez removed- Urinary retention- sicu to follow  No n/v  No cough/ cp/ palp/dyspnea/ sob  No f/c/s    Diet:  Diet, NPO (02-04-20 @ 14:10)      Appetite: [x  ]Poor [  ]Adequate [  ]Good  Caloric intake:  [ x  ]  Adequate   [   ] Inadequate    ROS: General/ GI see HPI  all other systems negative      ALLERGIES & MEDICATIONS  --------------------------------------------------------------------------------  ALLERGIES  IV Contrast (Hives)    STANDING INPATIENT MEDICATIONS    acetaminophen  IVPB .. 1000 milliGRAM(s) IV Intermittent every 6 hours  albuterol/ipratropium for Nebulization. 3 milliLiter(s) Nebulizer every 6 hours  buDESOnide    Inhalation Suspension 0.5 milliGRAM(s) Inhalation every 12 hours  chlorhexidine 2% Cloths 1 Application(s) Topical <User Schedule>  doxazosin 2 milliGRAM(s) Oral at bedtime  enoxaparin Injectable 40 milliGRAM(s) SubCutaneous daily  ergocalciferol 37007 Unit(s) Oral every week  fat emulsion (Fish Oil and Plant Based) 20% Infusion 20.8 mL/Hr IV Continuous <Continuous>  nystatin/triamcinolone Cream 1 Application(s) Topical two times a day  pantoprazole  Injectable 40 milliGRAM(s) IV Push daily  Parenteral Nutrition - Adult 1 Each TPN Continuous <Continuous>  triamcinolone 0.1% Ointment 1 Application(s) Topical two times a day      PRN INPATIENT MEDICATION  benzocaine 15 mG/menthol 3.6 mG (Sugar-Free) Lozenge 1 Lozenge Oral every 4 hours PRN  HYDROmorphone  Injectable 0.5 milliGRAM(s) IV Push every 3 hours PRN        VITALS/PHYSICAL EXAM  --------------------------------------------------------------------------------  T(C): 36.7 (02-06-20 @ 03:00), Max: 37.3 (02-05-20 @ 11:00)  HR: 102 (02-06-20 @ 10:00) (81 - 104)  BP: 109/57 (02-06-20 @ 10:00) (97/55 - 137/62)  RR: 19 (02-06-20 @ 10:00) (14 - 29)  SpO2: 96% (02-06-20 @ 10:00) (94% - 100%)  Wt(kg): --        02-05-20 @ 07:01  -  02-06-20 @ 07:00  --------------------------------------------------------  IN: 3212.4 mL / OUT: 1295 mL / NET: 1917.4 mL    02-06-20 @ 07:01  -  02-06-20 @ 10:16  --------------------------------------------------------  IN: 299 mL / OUT: 250 mL / NET: 49 mL    PHYSICAL EXAM:  --------------------------------------------------------------------------------  	Gen: guarded but stable, A&Ox3, NC O2, NGT  	HEENT: NC/AT, PERRL, supple neck, trachea midline, mucosa moist              GI: (+) BS, softly distended, appropriately tender                    midline dressing c/d/i                        Rt sided  ostomy dressing c/d/i              MSK: FROM x4, no contractures  	Vascular: Equally Warm,  no edema,  no clubbing, cyanosis,                      RUE PICC w/o sx infection   	Neuro: No focal deficits, intact sensation, weakened strength BLE>BUE  	Psych: Normal affect and mood              skin: moist, erythematous pinpoint rash chest/ back, groin        LABS/ CULTURES/ RADIOLOGY:              7.8    13.55 >-----------<  287      [02-06-20 @ 08:55]              24.4     137  |  100  |  26  ----------------------------<  116      [02-06-20 @ 03:58]  3.7   |  27  |  0.43        Ca     8.0     [02-06-20 @ 03:58]      iCa    1.14     [02-06 @ 04:07]      Mg     1.8     [02-06-20 @ 03:58]      Phos  2.9     [02-06-20 @ 03:58]    TPro  5.6  /  Alb  2.4  /  TBili  1.1  /  DBili  0.6  /  AST  21  /  ALT  26  /  AlkPhos  75  [02-06-20 @ 03:58]      Blood Gas Calcium, Ionized - Venous: 1.28 mmoL/L (02-04-20 @ 13:20)  Blood Gas Calcium, Ionized - Venous: 0.96 mmoL/L (02-04-20 @ 12:50)  Blood Gas Calcium, Ionized - Venous: 1.24 mmoL/L (02-04-20 @ 12:28)    Prealbumin, Serum: 21 mg/dL (02-03-20 @ 23:50)  Prealbumin, Serum: 19 mg/dL (02-03-20 @ 07:18)  Prealbumin, Serum: 18 mg/dL (02-02-20 @ 09:48)  Prealbumin, Serum: 18 mg/dL (01-31-20 @ 07:40)  Prealbumin, Serum: 19 mg/dL (01-27-20 @ 04:52)

## 2020-02-06 NOTE — PROGRESS NOTE ADULT - ASSESSMENT
73 y/o male presenting with high grade SBO s/p exploratory laparotomy, lysis of adhesions, decompression of bowel via enterotomy w/ primary repair, & Abthera VAC placement (12/06/2019); RTOR for re-exploration w/ Abthera VAC replacement (12/08/2019) to allow for demarcation of ischemic small bowel; RTOR for re-exploration, small bowel resection of 150 cm (150 cm remaining), ileocecetomy, end ileostomy, mucous fistula, and abdominal closure (12/10/2019); hospital course complicated by SVT, short bowel syndrome requiring repletions w/ IV fluids, malnutrition requiring TPN, intermittent episodes of hypotension, spontaneous left pneumothorax s/p pigtail catheter (12/15/2019-12/24/2019), left pleural effusion s/p pigtail catheter w/ IR (12/30/2019) & subsequent placement of Pleur-X catheter (1/10/2020) c/b left hemothorax s/p left VATS, dysphagia, and oral HSV lesions; now s/p ileostomy reversal    PLAN:    Neuro: acute post-op pain  - Monitor mental status  - Pain control as needed with IV acetaminophen and Dilaudid    Resp: COPD, spontaneous left pneumothorax s/p pigtail catheter, left pleural effusion s/p Pleur-X catheter c/b hemothorax s/p left VATS  - Monitor pulse oximeter  - Pulmicort and Duoneb for COPD  - Will need outpatient follow-up with a pulmonologist for his COPD as patient does not currently have one    CV: SVT  - Monitor vital signs    GI: s/p exploratory laparotomy, Grecia, decompression of bowel via enterotomy w/ primary repair, & Abthera VAC placement (12/06/2019); re-exploration w/ ABthera VAC replacement (12/08/2019); s/p re-exploration, small bowel resection of 150 cm (150 cm remaining), ileocecectomy end ileostomy, mucous fistula, & abdominal closure (12/10/2019); s/p ileostomy reversal (2/4/2020); short bowel syndrome, malnutrition, dysphagia  - NPO with NG tube to continuous low wall suction, NG tube management and diet as per primary team  - Protonix to decrease gastric secretions    Renal: urinary retention  - Monitor I&Os  - Monitor electrolytes and replete as necessary  - Doxazosin for urinary retention    Heme: anemia likely secondary to malnutrition / malabsorption  - Monitor CBC and coags  - Lovenox for VTE prophylaxis    ID: oral HSV lesions (s/p acyclovir 12/23/2019-01/02/2020), Pseudomonas PNA (s/p meropenem 1/19/2020-1/26/2020)  - Monitor for clinical evidence of active infection    Endo: no acute issues  - Monitor glucose q6hrs while on TPN    Disposition:  - Full code  - Stable for transfer to floors    Janelle Bah PA-C    [id number] 75 y/o male presenting with high grade SBO s/p exploratory laparotomy, lysis of adhesions, decompression of bowel via enterotomy w/ primary repair, & Abthera VAC placement (12/06/2019); RTOR for re-exploration w/ Abthera VAC replacement (12/08/2019) to allow for demarcation of ischemic small bowel; RTOR for re-exploration, small bowel resection of 150 cm (150 cm remaining), ileocecetomy, end ileostomy, mucous fistula, and abdominal closure (12/10/2019); hospital course complicated by SVT, short bowel syndrome requiring repletions w/ IV fluids, malnutrition requiring TPN, intermittent episodes of hypotension, spontaneous left pneumothorax s/p pigtail catheter (12/15/2019-12/24/2019), left pleural effusion s/p pigtail catheter w/ IR (12/30/2019) & subsequent placement of Pleur-X catheter (1/10/2020) c/b left hemothorax s/p left VATS, dysphagia, and oral HSV lesions; now s/p ileostomy reversal    PLAN:    Neuro: acute post-op pain  - Monitor mental status  - Pain control as needed with IV acetaminophen and Dilaudid    Resp: COPD, spontaneous left pneumothorax s/p pigtail catheter, left pleural effusion s/p Pleur-X catheter c/b hemothorax s/p left VATS  - Monitor pulse oximeter  - Pulmicort and Duoneb for COPD  - Will need outpatient follow-up with a pulmonologist for his COPD as patient does not currently have one    CV: SVT  - Monitor vital signs    GI: s/p exploratory laparotomy, Grecia, decompression of bowel via enterotomy w/ primary repair, & Abthera VAC placement (12/06/2019); re-exploration w/ ABthera VAC replacement (12/08/2019); s/p re-exploration, small bowel resection of 150 cm (150 cm remaining), ileocecectomy end ileostomy, mucous fistula, & abdominal closure (12/10/2019); s/p ileostomy reversal (2/4/2020); short bowel syndrome, malnutrition, dysphagia  - NPO with NG tube to continuous low wall suction, NG tube management and diet as per primary team  - Protonix to decrease gastric secretions    Renal: urinary retention, polyuria (resolved)  - Monitor I&Os  - Monitor electrolytes and replete as necessary  - Doxazosin for urinary retention    Heme: anemia likely secondary to malnutrition / malabsorption  - Monitor CBC and coags  - Lovenox for VTE prophylaxis    ID: oral HSV lesions (s/p acyclovir 12/23/2019-01/02/2020), Pseudomonas PNA (s/p meropenem 1/19/2020-1/26/2020)  - Monitor for clinical evidence of active infection    Endo: no acute issues  - Monitor glucose q6hrs while on TPN    Disposition:  - Full code  - Stable for transfer to floors    Janelle Bah PA-C    [id number]

## 2020-02-06 NOTE — CONSULT NOTE ADULT - ASSESSMENT
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 Complicated hospital course including spontaneous L PTX (12/15) likely 2nd bleb rupture with CT placement. Eventual L VATS with Pleurex placement (1/10) then re-op L VATS with partial decortication (1/15) and evacuation of hemothorax. Then s/p ex-lap, MIRYAM and ileostomy reversal 2/4. Called to eval for COPD and future mgmt.

## 2020-02-06 NOTE — PROGRESS NOTE ADULT - ATTENDING COMMENTS
Patient seen and examined and agree with above.   POD# 2 s/p reversal of ileostomy with side to side ileocolic anastamosis  Received 1 unit PRBC this morning for Hct 21 with appropriate response to Hct 23.   Respiratory insufficiency - on nasal cannula 2L/min with SpO2 95%  COPD- continue pulmicort at this time   Hemodynamically appropriate   Gi - continue TPN at this time   -NGT tube clamp trial this morning  Leukocytosis improving  Urinary retention - post void 286 at 8 am   -will scan now and if 250 or more straight cath

## 2020-02-06 NOTE — PROGRESS NOTE ADULT - ASSESSMENT
A/P: 75 year old male w/ PMH of COPD and EtOH dependence with PSH/o appy, prostate surgery, & spine surgery  septic shock and high grade SBO s/p exploratory laparotomy, lysis of adhesions, decompression of bowel via enterotomy w/ primary repair, and Abthera VAC placement on 12/6; s/p take down of Abthera, washout and re-application of the Abthera vac on 12/8. Now s/p SBR and end ileostomy on 12/10.   TPN consulted to assist w/ management of pt's nutrition in pt w/ prolonged hospital course now tolerating diet but has high Ileostomy output.  Pt s/p Lt Chest Pigtail for effusion in IR with persistent high output on 1/10/2020 pt had VATs & placement of Left PleurX catheter. Pt op pt developed hemothorax and Respiratory Distress.  Pt is s/p Lt chest Evacuation of 2L blood w/ placement of CT x2 on 1/15/2020 for Lt Pleural Effusion that's resolving and doing well after CT removed and resolved Rt PNA vs Pleural Effusion.   POD#2 s/p Ex Lap, MIRYAM, & Closure of End Ileostomy    Pt w/ improving severe Protein-Calorie Malnutrition-       Will continue to monitor for improvement of Short Gut Syndrome w/Malabsorption- post op  TPN at GOAL:    Amino Acids 120g, Dextrose 210g, and Lipids 50g in 2000mL with 3mL MTE & 10mL MVI          -HypoCa- improving w/ increased Ca in TPN to 14mEq- continue to monitor        Improved Ca levels helpful for BP & muscle contraction  -LowK - increase from 40mEq to 80mEq in TPN        maintaining K & Mg will help with GI motility   -LowMg- will increased Mg to 16mEq  -HypoPhos- improving w/-increased NaPhos & will continue to monitor   -Fecal fat testing suggestive of decreased absorption of fat -         TPN w/ MVI to compensate for low Vit E & A        Vit D levels low- being supplemented w/Ergocalciferol         Vit K INR/ PT near normal suggestive that it is being absorbed   -Edema resolved and Fluid overload resolved -continue to monitor          tolerating increased TPN volume & osm  -Strict Intake and Output - decreased urine output with condom cath- w/ elevated Post Void Residual                 possible urinary retention after removing rosas- follow up as per SICU          Stopped octreotide, lomotil, tincture of opium, & imodium while awaiting return of GI function  -Good glycemic control-  Fingersticks & ISS coverage - as per SICU  -Weights three times a week  -Daily CMP, Mg, Ionized Ca, Phosphorus,        and Weekly Triglycerides and Pre-albumin  Continue as per SICU/Surgery, will follow with you, D/w primary team    Andreina Hubbard PA-C  TPN team, pager [phone]  D/w Ana M Chacko & MU Siegel

## 2020-02-06 NOTE — PROGRESS NOTE ADULT - SUBJECTIVE AND OBJECTIVE BOX
Subjective: Patient seen and examined. No new events except as noted.   remains in ICU   24 HOUR EVENTS:  - Discontinued octreotide  - IV locked  - Unable to give Flomax through NG tube so changed to doxazosin  - Martinez discontinued and passed trial of void  - Bilateral LE Duplex negative for DVTs  - Dermatology consult obtained for persistent back rash determined to be irritated contact dermatitis so started triamcinolone 0.1% ointment BID on affected areas      REVIEW OF SYSTEMS:    CONSTITUTIONAL: No weakness, fevers or chills  EYES/ENT: No visual changes;  No vertigo or throat pain   NECK: No pain or stiffness  RESPIRATORY: No cough, wheezing, hemoptysis; No shortness of breath  CARDIOVASCULAR: No chest pain or palpitations  GASTROINTESTINAL: No abdominal or epigastric pain. No nausea, vomiting, or hematemesis; No diarrhea or constipation. No melena or hematochezia.  GENITOURINARY: No dysuria, frequency or hematuria  NEUROLOGICAL: No numbness or weakness  SKIN: No itching, burning, rashes, or lesions   All other review of systems is negative unless indicated above.    MEDICATIONS:  MEDICATIONS  (STANDING):  albuterol/ipratropium for Nebulization. 3 milliLiter(s) Nebulizer every 6 hours  buDESOnide    Inhalation Suspension 0.5 milliGRAM(s) Inhalation every 12 hours  chlorhexidine 2% Cloths 1 Application(s) Topical <User Schedule>  doxazosin 2 milliGRAM(s) Oral at bedtime  enoxaparin Injectable 40 milliGRAM(s) SubCutaneous daily  ergocalciferol 88787 Unit(s) Oral every week  fat emulsion (Fish Oil and Plant Based) 20% Infusion 20.8 mL/Hr (20.8 mL/Hr) IV Continuous <Continuous>  nystatin/triamcinolone Cream 1 Application(s) Topical two times a day  pantoprazole  Injectable 40 milliGRAM(s) IV Push daily  Parenteral Nutrition - Adult 1 Each (83 mL/Hr) TPN Continuous <Continuous>  Parenteral Nutrition - Adult 1 Each (83 mL/Hr) TPN Continuous <Continuous>  triamcinolone 0.1% Ointment 1 Application(s) Topical two times a day      PHYSICAL EXAM:  T(C): 37.2 (02-06-20 @ 11:00), Max: 37.2 (02-05-20 @ 15:00)  HR: 90 (02-06-20 @ 12:00) (86 - 104)  BP: 103/56 (02-06-20 @ 12:00) (97/55 - 126/60)  RR: 22 (02-06-20 @ 12:00) (17 - 29)  SpO2: 92% (02-06-20 @ 12:00) (91% - 98%)  Wt(kg): --  I&O's Summary    05 Feb 2020 07:01  -  06 Feb 2020 07:00  --------------------------------------------------------  IN: 3212.4 mL / OUT: 1295 mL / NET: 1917.4 mL    06 Feb 2020 07:01  -  06 Feb 2020 12:18  --------------------------------------------------------  IN: 648 mL / OUT: 950 mL / NET: -302 mL            Appearance: NAD	  HEENT:   Dry  oral mucosa, PERRL, EOMI	  Lymphatic: No lymphadenopathy +NGT   Cardiovascular: Normal S1 S2, No JVD, No murmurs , Peripheral pulses palpable 2+ bilaterally  Respiratory: decreased bs   Gastrointestinal:  Soft, +tender, + BS	  Skin:+ rashes, No ecchymoses, No cyanosis, warm to touch  Musculoskeletal: Decreased range of motion and  strength, LUE edema from elbow joint to shoulder   Psychiatry:  Mood & affect appropriate  Ext: no edema      LABS:    CARDIAC MARKERS:                                7.8    13.55 )-----------( 287      ( 06 Feb 2020 08:55 )             24.4     02-06    137  |  100  |  26<H>  ----------------------------<  116<H>  3.7   |  27  |  0.43<L>    Ca    8.0<L>      06 Feb 2020 03:58  Phos  2.9     02-06  Mg     1.8     02-06    TPro  5.6<L>  /  Alb  2.4<L>  /  TBili  1.1  /  DBili  0.6<H>  /  AST  21  /  ALT  26  /  AlkPhos  75  02-06    proBNP:   Lipid Profile:   HgA1c:   TSH:             TELEMETRY: SR	    ECG:  	  RADIOLOGY:  < from: Xray Chest 1 View- PORTABLE-Urgent (02.06.20 @ 11:05) >    EXAM:  XR CHEST PORTABLE URGENT 1V                            PROCEDURE DATE:  02/06/2020            INTERPRETATION:  CLINICAL INFORMATION: Enteric tube adjustment    EXAM: Chest X-ray, AP View    COMPARISON: Chest x-ray 2/4/2020    FINDINGS:  An enteric tube courses below the diaphragm, the tip is in the stomach. Right-sided PICC line with the tip in the SVC.    Left costophrenic angle is not completely included on this study. Visualized portions of the lungs are clear.    Heart size cannot beaccurately assessed in this projection.    No acute osseous findings.      IMPRESSION:     Enteric tube with the tip in stomach. Visualized portions are clear.                MERY LYNN M.D., RADIOLOGY RESIDENT  This document has been electronicallysigned.  CALLY TEMPLE M.D., ATTENDING RADIOLOGIST  This document has been electronically signed. Feb 6 2020 12:06PM                < end of copied text >    DIAGNOSTIC TESTING:  [ ] Echocardiogram:  [ ]  Catheterization:  [ ] Stress Test:    OTHER:

## 2020-02-06 NOTE — CONSULT NOTE ADULT - SUBJECTIVE AND OBJECTIVE BOX
PULMONARY CONSULT    HPI: 76 y/o male with a past medical history of COPD and EtOH dependence who presented on 12/6/2019 with abdominal pain, nausea, hematemesis, and poor PO intake for ~2-3 days. Imaging revealed a high grade SBO in the RLQ on CT scan. Hospital course is as follows:  12/06 - s/p exploratory laparotomy, lysis of adhesions, decompression of bowel via enterotomy w/ primary repair, and Abthera VAC placement as the distal 50% of the bowel appeared dusky but was still viable, admitted to SICU post-operatively as he was on vasopressor support and was left intubated  12/08 - s/p re-exploration, proximal 165 cm of small bowel appeared pink & viable but beyond that had patchy areas of ischemia so decision was made to give the bowel more time to demarcate before resecting in order to preserve as much small bowel as possible so Abthera VAC was replaced  12/09 - s/p re-exploration, small bowel resection of 150 cm (150 cm remaining), ileocecetomy, end ileostomy, mucous fistula, and abdominal closure  12/11 - extubated to BiPAP, noted to be in SVT that was rate controlled w/ metoprolol  12/14 - started on TPN  12/15 - noted to have spontaneous left pneumothorax s/p left pigtail catheter placement, noted to be in SVT again so amiodarone started, high ileostomy output noted so concern for short bowel syndrome  12/16 - amiodarone discontinued, started on beta blocker with metoprolol  12/18 - started Lomotil and ileostomy repletions with IV fluids  12/19 - started Imodium, left pigtail catheter was placed to water seal but pneumothorax noted on follow-up CXR so placed back to suction  12/20 - started tincture of opium, concern for aspiration so changed diet from regular to dysphagia 1 pureed with nectar-thickened liquids  12/22 - left pigtail catheter placed to water seal with no pneumothorax noted on follow-up CXR, CT chest with moderate left pleural effusion not being drained by pigtail catheter  12/23 - 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  12/24 - left pigtail catheter discontinued  12/28 - started Ancef for erythematous midline wound  12/29 - beta blocker discontinued for intermittent episodes of hypotension  12/30 - left pigtail catheter placement by IR with drainage of serous transudative fluid  01/02 - FEES demonstrating penetration with thin liquids so patient kept on dysphagia 1 pureed with nectar-thickened liquids  01/07 - transferred to floors  01/10 - s/p left VATS w/ PleurX catheter placement  01/11 - evaluated by speech & swallow, advanced diet to mechanical soft with thin liquids  01/12 - PleurX catheter placed to water seal with on pneumothorax on follow-up CXR  01/13 - RRT for hypoxemia, placed on BiPAP, PleurX catheter placed back to suction, started vancomycin & Zosyn for possible HCAP  01/14 - noted to have minimal output from PleurX catheter, lung ultrasound with large left pleural effusion concerning for hemothorax, multiple episodes of bradycardia secondary to hypoxemia, remains on BiPAP  01/15 - worsening respiratory distress requiring intubation, hypotensive requiring vasopressor support, CT chest with large left hemothorax w/ trace pneumothorax & extensive subcutaneous emphysema so taken to OR emergently for left VATS, evacuation fo 2 L of clot, and placement of two left chest tubes  01/16 - extubated, weaned off vasopressor support  01/17 - two left chest tubes placed to water seal with no pneumothorax noted on follow-up CXR  01/22 - inferior chest tube discontinued with no pneumothorax noted on follow-up CXR, polyuria noted  01/24 - given DDAVP for polyuria w/ good response  01/25 - superior chest tube discontinued with no pneumothorax noted on follow-up CXR  02/04 - s/p exploratory laparotomy, lysis of adhesions, and ileostomy reversal        PAST MEDICAL & SURGICAL HISTORY:  COPD with hypoxia  ETOHism  ETOH abuse  History of lumbosacral spine surgery  History of prostate surgery  History of appendectomy    Allergies    IV Contrast (Hives)    Intolerances      FAMILY HISTORY: Non-contributory     Social history: Former smoker     Review of Systems:  CONSTITUTIONAL: No fever, chills, or fatigue  EYES: No eye pain, visual disturbances, or discharge  ENMT:  No difficulty hearing, tinnitus, vertigo; No sinus or throat pain  NECK: No pain or stiffness  RESPIRATORY: Per above  CARDIOVASCULAR: No chest pain, palpitations, dizziness, or leg swelling  GASTROINTESTINAL: No abdominal or epigastric pain. No nausea, vomiting, or hematemesis; No diarrhea or constipation. No melena or hematochezia.  GENITOURINARY: No dysuria, frequency, hematuria, or incontinence  NEUROLOGICAL: No headaches, memory loss, loss of strength, numbness, or tremors  SKIN: No itching, burning, rashes, or lesions   MUSCULOSKELETAL: No joint pain or swelling; No muscle, back, or extremity pain  PSYCHIATRIC: No depression, anxiety, mood swings, or difficulty sleeping      Medications:  MEDICATIONS  (STANDING):  albuterol/ipratropium for Nebulization. 3 milliLiter(s) Nebulizer every 6 hours  buDESOnide    Inhalation Suspension 0.5 milliGRAM(s) Inhalation every 12 hours  chlorhexidine 2% Cloths 1 Application(s) Topical <User Schedule>  doxazosin 2 milliGRAM(s) Oral at bedtime  enoxaparin Injectable 40 milliGRAM(s) SubCutaneous daily  ergocalciferol 58694 Unit(s) Oral every week  fat emulsion (Fish Oil and Plant Based) 20% Infusion 20.8 mL/Hr (20.8 mL/Hr) IV Continuous <Continuous>  nystatin/triamcinolone Cream 1 Application(s) Topical two times a day  pantoprazole  Injectable 40 milliGRAM(s) IV Push daily  Parenteral Nutrition - Adult 1 Each (83 mL/Hr) TPN Continuous <Continuous>  Parenteral Nutrition - Adult 1 Each (83 mL/Hr) TPN Continuous <Continuous>  triamcinolone 0.1% Ointment 1 Application(s) Topical two times a day    MEDICATIONS  (PRN):  benzocaine 15 mG/menthol 3.6 mG (Sugar-Free) Lozenge 1 Lozenge Oral every 4 hours PRN Sore Throat  HYDROmorphone  Injectable 0.5 milliGRAM(s) IV Push every 3 hours PRN Severe Pain (7 - 10)            Vital Signs Last 24 Hrs  T(C): 37.1 (06 Feb 2020 15:00), Max: 37.2 (06 Feb 2020 07:00)  T(F): 98.8 (06 Feb 2020 15:00), Max: 99 (06 Feb 2020 07:00)  HR: 87 (06 Feb 2020 15:00) (86 - 104)  BP: 99/55 (06 Feb 2020 15:00) (97/55 - 130/59)  BP(mean): 72 (06 Feb 2020 15:00) (72 - 86)  RR: 20 (06 Feb 2020 15:00) (16 - 32)  SpO2: 94% (06 Feb 2020 15:00) (91% - 98%) on RA            02-05 @ 07:01  -  02-06 @ 07:00  --------------------------------------------------------  IN: 3212.4 mL / OUT: 1370 mL / NET: 1842.4 mL          LABS:                        7.8    13.55 )-----------( 287      ( 06 Feb 2020 08:55 )             24.4     02-06    137  |  100  |  26<H>  ----------------------------<  116<H>  3.7   |  27  |  0.43<L>    Ca    8.0<L>      06 Feb 2020 03:58  Phos  2.9     02-06  Mg     1.8     02-06    TPro  5.6<L>  /  Alb  2.4<L>  /  TBili  1.1  /  DBili  0.6<H>  /  AST  21  /  ALT  26  /  AlkPhos  75  02-06            Physical Examination:    General: No acute distress.      HEENT: Pupils equal, reactive to light.  Symmetric.    PULM: Decreased BS bilaterally     CVS: S1, S2    ABD: s/p surgery, no masses    EXT: No edema, nontender    SKIN: Warm and well perfused, no rashes noted.    NEURO: Alert, oriented, interactive, nonfocal    RADIOLOGY REVIEWED  CXR: LLL atelectasis, grossly clear    CT chest: < from: CT Chest w/ IV Cont (01.15.20 @ 13:25) >  CHEST:     LUNGS AND LARGE AIRWAYS: There is complete atelectasis of the left lower lobe and partial atelectasis of the left upper lobe. Partial compressive atelectasis of the right lower lobe. Emphysema. Nodular opacities in the right lower lobe, unchanged.  PLEURA: There is a left chest tubes. There is a large loculated left pleural effusion with areas of high attenuation, increased in size. Trace left pneumothorax. There is a small to moderate right pleural effusion.  VESSELS: Atherosclerotic changes of the aorta and coronary arteries. Right subclavian approach PICC tip in the SVC.  HEART: Heart size is normal. No pericardial effusion.  MEDIASTINUM AND MIKI: No lymphadenopathy.  CHEST WALL AND LOWER NECK: Extensive subcutaneous emphysema along the left chest wall. In addition, high attenuation is noted in the left chest wall adjacent to the pleural catheter measuring approximately 3.4 x 2.1 cm (3:146), suspicious for hematoma.    < end of copied text >

## 2020-02-07 DIAGNOSIS — R05 COUGH: ICD-10-CM

## 2020-02-07 LAB
ALBUMIN SERPL ELPH-MCNC: 2.5 G/DL — LOW (ref 3.3–5)
ALP SERPL-CCNC: 81 U/L — SIGNIFICANT CHANGE UP (ref 40–120)
ALT FLD-CCNC: 24 U/L — SIGNIFICANT CHANGE UP (ref 10–45)
ANION GAP SERPL CALC-SCNC: 11 MMOL/L — SIGNIFICANT CHANGE UP (ref 5–17)
AST SERPL-CCNC: 17 U/L — SIGNIFICANT CHANGE UP (ref 10–40)
BILIRUB DIRECT SERPL-MCNC: 0.7 MG/DL — HIGH (ref 0–0.2)
BILIRUB INDIRECT FLD-MCNC: 0.7 MG/DL — SIGNIFICANT CHANGE UP (ref 0.2–1)
BILIRUB SERPL-MCNC: 1.4 MG/DL — HIGH (ref 0.2–1.2)
BUN SERPL-MCNC: 23 MG/DL — SIGNIFICANT CHANGE UP (ref 7–23)
CA-I BLD-SCNC: 1.12 MMOL/L — SIGNIFICANT CHANGE UP (ref 1.12–1.3)
CALCIUM SERPL-MCNC: 8 MG/DL — LOW (ref 8.4–10.5)
CHLORIDE SERPL-SCNC: 100 MMOL/L — SIGNIFICANT CHANGE UP (ref 96–108)
CO2 SERPL-SCNC: 26 MMOL/L — SIGNIFICANT CHANGE UP (ref 22–31)
CREAT SERPL-MCNC: 0.35 MG/DL — LOW (ref 0.5–1.3)
GLUCOSE SERPL-MCNC: 113 MG/DL — HIGH (ref 70–99)
HCT VFR BLD CALC: 23.3 % — LOW (ref 39–50)
HCT VFR BLD CALC: 23.5 % — LOW (ref 39–50)
HGB BLD-MCNC: 7.3 G/DL — LOW (ref 13–17)
HGB BLD-MCNC: 7.6 G/DL — LOW (ref 13–17)
MAGNESIUM SERPL-MCNC: 1.9 MG/DL — SIGNIFICANT CHANGE UP (ref 1.6–2.6)
MCHC RBC-ENTMCNC: 29.2 PG — SIGNIFICANT CHANGE UP (ref 27–34)
MCHC RBC-ENTMCNC: 29.5 PG — SIGNIFICANT CHANGE UP (ref 27–34)
MCHC RBC-ENTMCNC: 31.3 GM/DL — LOW (ref 32–36)
MCHC RBC-ENTMCNC: 32.3 GM/DL — SIGNIFICANT CHANGE UP (ref 32–36)
MCV RBC AUTO: 91.1 FL — SIGNIFICANT CHANGE UP (ref 80–100)
MCV RBC AUTO: 93.2 FL — SIGNIFICANT CHANGE UP (ref 80–100)
NRBC # BLD: 0 /100 WBCS — SIGNIFICANT CHANGE UP (ref 0–0)
NRBC # BLD: 0 /100 WBCS — SIGNIFICANT CHANGE UP (ref 0–0)
PHOSPHATE SERPL-MCNC: 3.1 MG/DL — SIGNIFICANT CHANGE UP (ref 2.5–4.5)
PLATELET # BLD AUTO: 265 K/UL — SIGNIFICANT CHANGE UP (ref 150–400)
PLATELET # BLD AUTO: 268 K/UL — SIGNIFICANT CHANGE UP (ref 150–400)
POTASSIUM SERPL-MCNC: 4 MMOL/L — SIGNIFICANT CHANGE UP (ref 3.5–5.3)
POTASSIUM SERPL-SCNC: 4 MMOL/L — SIGNIFICANT CHANGE UP (ref 3.5–5.3)
PROT SERPL-MCNC: 5.9 G/DL — LOW (ref 6–8.3)
RBC # BLD: 2.5 M/UL — LOW (ref 4.2–5.8)
RBC # BLD: 2.58 M/UL — LOW (ref 4.2–5.8)
RBC # FLD: 14.6 % — HIGH (ref 10.3–14.5)
RBC # FLD: 14.6 % — HIGH (ref 10.3–14.5)
SODIUM SERPL-SCNC: 137 MMOL/L — SIGNIFICANT CHANGE UP (ref 135–145)
WBC # BLD: 12.2 K/UL — HIGH (ref 3.8–10.5)
WBC # BLD: 13.49 K/UL — HIGH (ref 3.8–10.5)
WBC # FLD AUTO: 12.2 K/UL — HIGH (ref 3.8–10.5)
WBC # FLD AUTO: 13.49 K/UL — HIGH (ref 3.8–10.5)

## 2020-02-07 PROCEDURE — 99233 SBSQ HOSP IP/OBS HIGH 50: CPT | Mod: GC

## 2020-02-07 PROCEDURE — 99233 SBSQ HOSP IP/OBS HIGH 50: CPT

## 2020-02-07 RX ORDER — ACETYLCYSTEINE 200 MG/ML
4 VIAL (ML) MISCELLANEOUS THREE TIMES A DAY
Refills: 0 | Status: DISCONTINUED | OUTPATIENT
Start: 2020-02-07 | End: 2020-02-07

## 2020-02-07 RX ORDER — MAGNESIUM SULFATE 500 MG/ML
2 VIAL (ML) INJECTION ONCE
Refills: 0 | Status: COMPLETED | OUTPATIENT
Start: 2020-02-07 | End: 2020-02-07

## 2020-02-07 RX ORDER — I.V. FAT EMULSION 20 G/100ML
20.8 EMULSION INTRAVENOUS
Qty: 50 | Refills: 0 | Status: DISCONTINUED | OUTPATIENT
Start: 2020-02-07 | End: 2020-02-08

## 2020-02-07 RX ORDER — ACETAMINOPHEN 500 MG
1000 TABLET ORAL EVERY 6 HOURS
Refills: 0 | Status: COMPLETED | OUTPATIENT
Start: 2020-02-07 | End: 2020-02-08

## 2020-02-07 RX ORDER — ELECTROLYTE SOLUTION,INJ
1 VIAL (ML) INTRAVENOUS
Refills: 0 | Status: DISCONTINUED | OUTPATIENT
Start: 2020-02-07 | End: 2020-02-07

## 2020-02-07 RX ORDER — PANTOPRAZOLE SODIUM 20 MG/1
40 TABLET, DELAYED RELEASE ORAL EVERY 12 HOURS
Refills: 0 | Status: DISCONTINUED | OUTPATIENT
Start: 2020-02-07 | End: 2020-02-08

## 2020-02-07 RX ADMIN — Medication 600 MILLIGRAM(S): at 18:41

## 2020-02-07 RX ADMIN — Medication 3 MILLILITER(S): at 11:36

## 2020-02-07 RX ADMIN — Medication 1 APPLICATION(S): at 18:42

## 2020-02-07 RX ADMIN — Medication 1 APPLICATION(S): at 06:14

## 2020-02-07 RX ADMIN — TAMSULOSIN HYDROCHLORIDE 0.4 MILLIGRAM(S): 0.4 CAPSULE ORAL at 21:39

## 2020-02-07 RX ADMIN — Medication 50 GRAM(S): at 02:59

## 2020-02-07 RX ADMIN — Medication 3 MILLILITER(S): at 00:22

## 2020-02-07 RX ADMIN — Medication 4 MILLILITER(S): at 11:36

## 2020-02-07 RX ADMIN — Medication 400 MILLIGRAM(S): at 20:13

## 2020-02-07 RX ADMIN — Medication 1 APPLICATION(S): at 18:43

## 2020-02-07 RX ADMIN — PANTOPRAZOLE SODIUM 40 MILLIGRAM(S): 20 TABLET, DELAYED RELEASE ORAL at 18:42

## 2020-02-07 RX ADMIN — Medication 0.5 MILLIGRAM(S): at 06:39

## 2020-02-07 RX ADMIN — Medication 3 MILLILITER(S): at 17:17

## 2020-02-07 RX ADMIN — CHLORHEXIDINE GLUCONATE 1 APPLICATION(S): 213 SOLUTION TOPICAL at 06:16

## 2020-02-07 RX ADMIN — PANTOPRAZOLE SODIUM 40 MILLIGRAM(S): 20 TABLET, DELAYED RELEASE ORAL at 11:47

## 2020-02-07 RX ADMIN — Medication 400 MILLIGRAM(S): at 11:48

## 2020-02-07 RX ADMIN — Medication 0.5 MILLIGRAM(S): at 17:17

## 2020-02-07 RX ADMIN — Medication 3 MILLILITER(S): at 06:39

## 2020-02-07 RX ADMIN — Medication 1000 MILLIGRAM(S): at 13:27

## 2020-02-07 RX ADMIN — Medication 400 MILLIGRAM(S): at 00:11

## 2020-02-07 RX ADMIN — HYDROMORPHONE HYDROCHLORIDE 0.5 MILLIGRAM(S): 2 INJECTION INTRAMUSCULAR; INTRAVENOUS; SUBCUTANEOUS at 13:27

## 2020-02-07 RX ADMIN — HYDROMORPHONE HYDROCHLORIDE 0.5 MILLIGRAM(S): 2 INJECTION INTRAMUSCULAR; INTRAVENOUS; SUBCUTANEOUS at 11:47

## 2020-02-07 RX ADMIN — Medication 400 MILLIGRAM(S): at 06:15

## 2020-02-07 RX ADMIN — ENOXAPARIN SODIUM 40 MILLIGRAM(S): 100 INJECTION SUBCUTANEOUS at 11:48

## 2020-02-07 NOTE — PROGRESS NOTE ADULT - ASSESSMENT
A/P: 75 year old male w/ PMH of COPD and EtOH dependence with PSH/o appy, prostate surgery, & spine surgery  septic shock and high grade SBO s/p exploratory laparotomy, lysis of adhesions, decompression of bowel via enterotomy w/ primary repair, and Abthera VAC placement on 12/6; s/p take down of Abthera, washout and re-application of the Abthera vac on 12/8. Now s/p SBR and end ileostomy on 12/10.   TPN consulted to assist w/ management of pt's nutrition in pt w/ prolonged hospital course now tolerating diet but has high Ileostomy output.  Pt s/p Lt Chest Pigtail for effusion in IR with persistent high output on 1/10/2020 pt had VATs & placement of Left PleurX catheter. Pt op pt developed hemothorax and Respiratory Distress.  Pt is s/p Lt chest Evacuation of 2L blood w/ placement of CT x2 on 1/15/2020 for Lt Pleural Effusion that's resolving and doing well after CT removed and resolved Rt PNA vs Pleural Effusion.   Pt s/p Ex Lap, MIRYAM, & Closure of End Ileostomy on 2/4/2020.    Pt's diet advanced to CLD this am  Pt w/ improving severe Protein-Calorie Malnutrition-       Will continue to monitor for improvement of Short Gut Syndrome w/Malabsorption- post op  TPN at GOAL:    Amino Acids 120g, Dextrose 210g, and Lipids 50g in 2000mL with 3mL MTE & 10mL MVI          -HypoCa- improving w/ increased Ca in TPN to 14mEq- continue to monitor        Improved Ca levels helpful for BP & muscle contraction  -LowK - improving, will continue w/ 80mEq KCl in TPN        maintaining K & Mg will help with GI motility   -LowMg- will increased Mg to 16mEq  -HypoPhos- improving w/-increased NaPhos & will continue to monitor   -Fecal fat testing suggestive of decreased absorption of fat -        will monitor for improved absorption with improving GI function        TPN w/ MVI to compensate for low Vit E & A        Vit D levels low- being supplemented w/Ergocalciferol         Vit K INR/ PT near normal suggestive that it is being absorbed   -Edema resolved and Fluid overload resolved -continue to monitor          tolerating increased TPN volume & osm  -Strict Intake and Output -          Urinary retention after removing rosas- follow up as per SICU- urine cx sent         Stopped octreotide, lomotil, tincture of opium, & imodium - will continue to defer w/ return of GI             function to surgery team  -Good glycemic control-  Fingersticks & ISS coverage - as per SICU  -Weights three times a week  -Daily CMP, Mg, Ionized Ca, Phosphorus,        and Weekly Triglycerides and Pre-albumin  Continue as per SICU/Surgery, will follow with you, D/w primary team    Andreina Hubbard PA-C  TPN team, pager [phone]  D/w Ana M Chacko & MU Siegel

## 2020-02-07 NOTE — PROGRESS NOTE ADULT - SUBJECTIVE AND OBJECTIVE BOX
Subjective: Patient seen and examined. No new events except as noted.   remains in ICU   sleepy and weak   no cp or sob    24 HOUR EVENTS:  - NGT discontinued, sips/chips started  - passed gas and had loose BM overnight  - Overnight, UA returned w/ "occasional bacteria"  - Ucx     REVIEW OF SYSTEMS:    CONSTITUTIONAL: + weakness, fevers or chills  EYES/ENT: No visual changes;  No vertigo or throat pain   NECK: No pain or stiffness  RESPIRATORY: No cough, wheezing, hemoptysis; No shortness of breath  CARDIOVASCULAR: No chest pain or palpitations  GASTROINTESTINAL: No abdominal or epigastric pain. No nausea, vomiting, or hematemesis; No diarrhea or constipation. No melena or hematochezia.  GENITOURINARY: No dysuria, frequency or hematuria  NEUROLOGICAL: No numbness or weakness  SKIN: No itching, burning, rashes, or lesions   All other review of systems is negative unless indicated above.    MEDICATIONS:  MEDICATIONS  (STANDING):  acetylcysteine 20%  Inhalation 4 milliLiter(s) Inhalation three times a day  albuterol/ipratropium for Nebulization. 3 milliLiter(s) Nebulizer every 6 hours  buDESOnide    Inhalation Suspension 0.5 milliGRAM(s) Inhalation every 12 hours  chlorhexidine 2% Cloths 1 Application(s) Topical <User Schedule>  enoxaparin Injectable 40 milliGRAM(s) SubCutaneous daily  ergocalciferol 69184 Unit(s) Oral every week  fat emulsion (Fish Oil and Plant Based) 20% Infusion 20.8 mL/Hr (20.8 mL/Hr) IV Continuous <Continuous>  nystatin/triamcinolone Cream 1 Application(s) Topical two times a day  pantoprazole  Injectable 40 milliGRAM(s) IV Push daily  Parenteral Nutrition - Adult 1 Each (83 mL/Hr) TPN Continuous <Continuous>  Parenteral Nutrition - Adult 1 Each (83 mL/Hr) TPN Continuous <Continuous>  tamsulosin 0.4 milliGRAM(s) Oral at bedtime  triamcinolone 0.1% Ointment 1 Application(s) Topical two times a day      PHYSICAL EXAM:  T(C): 36.6 (02-07-20 @ 07:00), Max: 37.1 (02-06-20 @ 15:00)  HR: 95 (02-07-20 @ 11:39) (86 - 108)  BP: 141/71 (02-07-20 @ 11:00) (99/55 - 152/66)  RR: 28 (02-07-20 @ 11:00) (16 - 32)  SpO2: 99% (02-07-20 @ 11:39) (92% - 100%)  Wt(kg): --  I&O's Summary    06 Feb 2020 07:01  -  07 Feb 2020 07:00  --------------------------------------------------------  IN: 2462.4 mL / OUT: 2000 mL / NET: 462.4 mL    07 Feb 2020 07:01  -  07 Feb 2020 11:52  --------------------------------------------------------  IN: 249 mL / OUT: 350 mL / NET: -101 mL            Appearance: NAD	  HEENT:   Dry  oral mucosa, PERRL, EOMI	  Lymphatic: No lymphadenopathy +NGT   Cardiovascular: Normal S1 S2, No JVD, No murmurs , Peripheral pulses palpable 2+ bilaterally  Respiratory: decreased bs   Gastrointestinal:  Soft, +tender, + BS	  Skin:+ rashes, No ecchymoses, No cyanosis, warm to touch  Musculoskeletal: Decreased range of motion and  strength, LUE edema from elbow joint to shoulder   Psychiatry:  Mood & affect appropriate  Ext: no edema      LABS:    CARDIAC MARKERS:                                7.6    13.49 )-----------( 265      ( 07 Feb 2020 00:15 )             23.5     02-07    137  |  100  |  23  ----------------------------<  113<H>  4.0   |  26  |  0.35<L>    Ca    8.0<L>      07 Feb 2020 00:15  Phos  3.1     02-07  Mg     1.9     02-07    TPro  5.9<L>  /  Alb  2.5<L>  /  TBili  1.4<H>  /  DBili  0.7<H>  /  AST  17  /  ALT  24  /  AlkPhos  81  02-07    proBNP:   Lipid Profile:   HgA1c:   TSH:               TELEMETRY: 	SR    ECG:  	  RADIOLOGY:   DIAGNOSTIC TESTING:  [ ] Echocardiogram:  [ ]  Catheterization:  [ ] Stress Test:    OTHER:

## 2020-02-07 NOTE — PROGRESS NOTE ADULT - SUBJECTIVE AND OBJECTIVE BOX
SICU DAILY PROGRESS NOTE    76 y/o male with a past medical history of COPD and EtOH dependence who presented on 12/6/2019 with abdominal pain, nausea, hematemesis, and poor PO intake for ~2-3 days. In the ED, he was found to be hypotensive & tachycardic. Labs revealed an CHI and lactic acidosis. Imaging revealed a high grade SBO in the RLQ on CT scan. Hospital course is as follows:  12/06 - s/p exploratory laparotomy, lysis of adhesions, decompression of bowel via enterotomy w/ primary repair, and Abthera VAC placement as the distal 50% of the bowel appeared dusky but was still viable, admitted to SICU post-operatively as he was on vasopressor support and was left intubated  12/08 - s/p re-exploration, proximal 165 cm of small bowel appeared pink & viable but beyond that had patchy areas of ischemia so decision was made to give the bowel more time to demarcate before resecting in order to preserve as much small bowel as possible so Abthera VAC was replaced  12/09 - s/p re-exploration, small bowel resection of 150 cm (150 cm remaining), ileocecetomy, end ileostomy, mucous fistula, and abdominal closure  12/11 - extubated to BiPAP, noted to be in SVT that was rate controlled w/ metoprolol  12/14 - started on TPN  12/15 - noted to have spontaneous left pneumothorax s/p left pigtail catheter placement, noted to be in SVT again so amiodarone started, high ileostomy output noted so concern for short bowel syndrome  12/16 - amiodarone discontinued, started on beta blocker with metoprolol  12/18 - started Lomotil and ileostomy repletions with IV fluids  12/19 - started Imodium, left pigtail catheter was placed to water seal but pneumothorax noted on follow-up CXR so placed back to suction  12/20 - started tincture of opium, concern for aspiration so changed diet from regular to dysphagia 1 pureed with nectar-thickened liquids  12/22 - left pigtail catheter placed to water seal with no pneumothorax noted on follow-up CXR, CT chest with moderate left pleural effusion not being drained by pigtail catheter  12/23 - 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  12/24 - left pigtail catheter discontinued  12/28 - started Ancef for erythematous midline wound  12/29 - beta blocker discontinued for intermittent episodes of hypotension  12/30 - left pigtail catheter placement by IR with drainage of serous transudative fluid  01/02 - FEES demonstrating penetration with thin liquids so patient kept on dysphagia 1 pureed with nectar-thickened liquids  01/07 - transferred to floors  01/10 - s/p left VATS w/ PleurX catheter placement  01/11 - evaluated by speech & swallow, advanced diet to mechanical soft with thin liquids  01/12 - PleurX catheter placed to water seal with on pneumothorax on follow-up CXR  01/13 - RRT for hypoxemia, placed on BiPAP, PleurX catheter placed back to suction, started vancomycin & Zosyn for possible HCAP  01/14 - noted to have minimal output from PleurX catheter, lung ultrasound with large left pleural effusion concerning for hemothorax, multiple episodes of bradycardia secondary to hypoxemia, remains on BiPAP  01/15 - worsening respiratory distress requiring intubation, hypotensive requiring vasopressor support, CT chest with large left hemothorax w/ trace pneumothorax & extensive subcutaneous emphysema so taken to OR emergently for left VATS, evacuation fo 2 L of clot, and placement of two left chest tubes  01/16 - extubated, weaned off vasopressor support  01/17 - two left chest tubes placed to water seal with no pneumothorax noted on follow-up CXR  01/22 - inferior chest tube discontinued with no pneumothorax noted on follow-up CXR, polyuria noted  01/24 - given DDAVP for polyuria w/ good response  01/25 - superior chest tube discontinued with no pneumothorax noted on follow-up CXR  02/04 - s/p exploratory laparotomy, lysis of adhesions, and ileostomy reversal      24 HOUR EVENTS:  - NGT discontinued, sips/chips started  - No GI fxn yet  - Overnight, UA returned w/ "occasional bacteria"  - Ucx sent    SUBJECTIVE/ROS:  [x] A ten-point review of systems was otherwise negative except as noted.  [ ] Due to altered mental status/intubation, subjective information were not able to be obtained from the patient. History was obtained, to the extent possible, from review of the chart and collateral sources of information.      NEURO  Exam: awake, alert, oriented  Meds: acetaminophen  IVPB .. 1000 milliGRAM(s) IV Intermittent every 6 hours  HYDROmorphone  Injectable 0.5 milliGRAM(s) IV Push every 3 hours PRN Severe Pain (7 - 10)    [x] Adequacy of sedation and pain control has been assessed and adjusted      RESPIRATORY  RR: 20 (02-07-20 @ 01:00) (16 - 32)  SpO2: 93% (02-07-20 @ 01:00) (91% - 98%)  Wt(kg): --  Exam: unlabored, clear to auscultation bilaterally    [N/A] Extubation Readiness Assessed  Meds: albuterol/ipratropium for Nebulization. 3 milliLiter(s) Nebulizer every 6 hours  buDESOnide    Inhalation Suspension 0.5 milliGRAM(s) Inhalation every 12 hours    CARDIOVASCULAR  HR: 99 (02-07-20 @ 01:00) (86 - 108)  BP: 127/58 (02-07-20 @ 01:00) (97/55 - 152/66)  BP(mean): 84 (02-07-20 @ 01:00) (72 - 113)  ABP: --  ABP(mean): --  Wt(kg): --  CVP(cm H2O): --      Exam: regular rate and rhythm  Cardiac Rhythm: sinus  Perfusion     [x]Adequate   [ ]Inadequate  Mentation   [x]Normal       [ ]Reduced  Extremities  [x]Warm         [ ]Cool  Volume Status [ ]Hypervolemic [x]Euvolemic [ ]Hypovolemic  Meds: tamsulosin 0.4 milliGRAM(s) Oral at bedtime      GI/NUTRITION  Exam: soft, nontender, nondistended, incision C/D/I  Diet: sips/chips  Meds: pantoprazole  Injectable 40 milliGRAM(s) IV Push daily      GENITOURINARY  I&O's Detail    02-05 @ 07:01  -  02-06 @ 07:00  --------------------------------------------------------  IN:    fat emulsion (Fish Oil and Plant Based) 20% Infusion: 270.4 mL    Packed Red Blood Cells: 350 mL    Solution: 400 mL    Solution: 200 mL    TPN (Total Parenteral Nutrition): 1992 mL  Total IN: 3212.4 mL    OUT:    Indwelling Catheter - Urethral: 595 mL    Nasoenteral Tube: 75 mL    Voided: 700 mL  Total OUT: 1370 mL    Total NET: 1842.4 mL      02-06 @ 07:01  -  02-07 @ 01:36  --------------------------------------------------------  IN:    fat emulsion (Fish Oil and Plant Based) 20% Infusion: 166.4 mL    Solution: 150 mL    TPN (Total Parenteral Nutrition): 1411 mL  Total IN: 1727.4 mL    OUT:    Intermittent Catheterization - Urethral: 700 mL    Voided: 850 mL  Total OUT: 1550 mL    Total NET: 177.4 mL          02-07    137  |  100  |  23  ----------------------------<  113<H>  4.0   |  26  |  0.35<L>    Ca    8.0<L>      07 Feb 2020 00:15  Phos  3.1     02-07  Mg     1.9     02-07    TPro  5.9<L>  /  Alb  2.5<L>  /  TBili  1.4<H>  /  DBili  0.7<H>  /  AST  17  /  ALT  24  /  AlkPhos  81  02-07    [ ] Martinez catheter, indication: N/A  Meds: ergocalciferol 10770 Unit(s) Oral every week  fat emulsion (Fish Oil and Plant Based) 20% Infusion 20.8 mL/Hr IV Continuous <Continuous>  Parenteral Nutrition - Adult 1 Each TPN Continuous <Continuous>        HEMATOLOGIC  Meds: enoxaparin Injectable 40 milliGRAM(s) SubCutaneous daily    [x] VTE Prophylaxis                        7.6    13.49 )-----------( 265      ( 07 Feb 2020 00:15 )             23.5       Transfusion     [ ] PRBC   [ ] Platelets   [ ] FFP   [ ] Cryoprecipitate      INFECTIOUS DISEASES  WBC Count: 13.49 K/uL (02-07 @ 00:15)  WBC Count: 13.55 K/uL (02-06 @ 08:55)  WBC Count: 13.58 K/uL (02-06 @ 03:58)    ACCESS DEVICES:  [x] Peripheral IV  [ ] Central Venous Line	[ ] R	[ ] L	[ ] IJ	[ ] Fem	[ ] SC	Placed:   [ ] Arterial Line		[ ] R	[ ] L	[ ] Fem	[ ] Rad	[ ] Ax	Placed:   [ ] PICC:					[ ] Mediport  [ ] Urinary Catheter, Date Placed:   [x] Necessity of urinary, arterial, and venous catheters discussed    OTHER MEDICATIONS:  benzocaine 15 mG/menthol 3.6 mG (Sugar-Free) Lozenge 1 Lozenge Oral every 4 hours PRN  chlorhexidine 2% Cloths 1 Application(s) Topical <User Schedule>  nystatin/triamcinolone Cream 1 Application(s) Topical two times a day  triamcinolone 0.1% Ointment 1 Application(s) Topical two times a day      CODE STATUS: full SICU DAILY PROGRESS NOTE    74 y/o male with a past medical history of COPD and EtOH dependence who presented on 12/6/2019 with abdominal pain, nausea, hematemesis, and poor PO intake for ~2-3 days. In the ED, he was found to be hypotensive & tachycardic. Labs revealed an CHI and lactic acidosis. Imaging revealed a high grade SBO in the RLQ on CT scan. Hospital course is as follows:  12/06 - s/p exploratory laparotomy, lysis of adhesions, decompression of bowel via enterotomy w/ primary repair, and Abthera VAC placement as the distal 50% of the bowel appeared dusky but was still viable, admitted to SICU post-operatively as he was on vasopressor support and was left intubated  12/08 - s/p re-exploration, proximal 165 cm of small bowel appeared pink & viable but beyond that had patchy areas of ischemia so decision was made to give the bowel more time to demarcate before resecting in order to preserve as much small bowel as possible so Abthera VAC was replaced  12/09 - s/p re-exploration, small bowel resection of 150 cm (150 cm remaining), ileocecetomy, end ileostomy, mucous fistula, and abdominal closure  12/11 - extubated to BiPAP, noted to be in SVT that was rate controlled w/ metoprolol  12/14 - started on TPN  12/15 - noted to have spontaneous left pneumothorax s/p left pigtail catheter placement, noted to be in SVT again so amiodarone started, high ileostomy output noted so concern for short bowel syndrome  12/16 - amiodarone discontinued, started on beta blocker with metoprolol  12/18 - started Lomotil and ileostomy repletions with IV fluids  12/19 - started Imodium, left pigtail catheter was placed to water seal but pneumothorax noted on follow-up CXR so placed back to suction  12/20 - started tincture of opium, concern for aspiration so changed diet from regular to dysphagia 1 pureed with nectar-thickened liquids  12/22 - left pigtail catheter placed to water seal with no pneumothorax noted on follow-up CXR, CT chest with moderate left pleural effusion not being drained by pigtail catheter  12/23 - 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  12/24 - left pigtail catheter discontinued  12/28 - started Ancef for erythematous midline wound  12/29 - beta blocker discontinued for intermittent episodes of hypotension  12/30 - left pigtail catheter placement by IR with drainage of serous transudative fluid  01/02 - FEES demonstrating penetration with thin liquids so patient kept on dysphagia 1 pureed with nectar-thickened liquids  01/07 - transferred to floors  01/10 - s/p left VATS w/ PleurX catheter placement  01/11 - evaluated by speech & swallow, advanced diet to mechanical soft with thin liquids  01/12 - PleurX catheter placed to water seal with on pneumothorax on follow-up CXR  01/13 - RRT for hypoxemia, placed on BiPAP, PleurX catheter placed back to suction, started vancomycin & Zosyn for possible HCAP  01/14 - noted to have minimal output from PleurX catheter, lung ultrasound with large left pleural effusion concerning for hemothorax, multiple episodes of bradycardia secondary to hypoxemia, remains on BiPAP  01/15 - worsening respiratory distress requiring intubation, hypotensive requiring vasopressor support, CT chest with large left hemothorax w/ trace pneumothorax & extensive subcutaneous emphysema so taken to OR emergently for left VATS, evacuation fo 2 L of clot, and placement of two left chest tubes  01/16 - extubated, weaned off vasopressor support  01/17 - two left chest tubes placed to water seal with no pneumothorax noted on follow-up CXR  01/22 - inferior chest tube discontinued with no pneumothorax noted on follow-up CXR, polyuria noted  01/24 - given DDAVP for polyuria w/ good response  01/25 - superior chest tube discontinued with no pneumothorax noted on follow-up CXR  02/04 - s/p exploratory laparotomy, lysis of adhesions, and ileostomy reversal      24 HOUR EVENTS:  - NGT discontinued, sips/chips started  - passed gas and had loose BM overnight  - Overnight, UA returned w/ "occasional bacteria"  - Ucx sent    SUBJECTIVE/ROS:  [x] A ten-point review of systems was otherwise negative except as noted.  [ ] Due to altered mental status/intubation, subjective information were not able to be obtained from the patient. History was obtained, to the extent possible, from review of the chart and collateral sources of information.      NEURO  Exam: awake, alert, oriented  Meds: acetaminophen  IVPB .. 1000 milliGRAM(s) IV Intermittent every 6 hours  HYDROmorphone  Injectable 0.5 milliGRAM(s) IV Push every 3 hours PRN Severe Pain (7 - 10)    [x] Adequacy of sedation and pain control has been assessed and adjusted      RESPIRATORY  RR: 20 (02-07-20 @ 01:00) (16 - 32)  SpO2: 93% (02-07-20 @ 01:00) (91% - 98%)  Wt(kg): --  Exam: unlabored, clear to auscultation bilaterally    [N/A] Extubation Readiness Assessed  Meds: albuterol/ipratropium for Nebulization. 3 milliLiter(s) Nebulizer every 6 hours  buDESOnide    Inhalation Suspension 0.5 milliGRAM(s) Inhalation every 12 hours    CARDIOVASCULAR  HR: 99 (02-07-20 @ 01:00) (86 - 108)  BP: 127/58 (02-07-20 @ 01:00) (97/55 - 152/66)  BP(mean): 84 (02-07-20 @ 01:00) (72 - 113)  ABP: --  ABP(mean): --  Wt(kg): --  CVP(cm H2O): --      Exam: regular rate and rhythm  Cardiac Rhythm: sinus  Perfusion     [x]Adequate   [ ]Inadequate  Mentation   [x]Normal       [ ]Reduced  Extremities  [x]Warm         [ ]Cool  Volume Status [ ]Hypervolemic [x]Euvolemic [ ]Hypovolemic  Meds: tamsulosin 0.4 milliGRAM(s) Oral at bedtime      GI/NUTRITION  Exam: soft, nontender, nondistended, incision C/D/I  Diet: sips/chips  Meds: pantoprazole  Injectable 40 milliGRAM(s) IV Push daily      GENITOURINARY  I&O's Detail    02-05 @ 07:01  -  02-06 @ 07:00  --------------------------------------------------------  IN:    fat emulsion (Fish Oil and Plant Based) 20% Infusion: 270.4 mL    Packed Red Blood Cells: 350 mL    Solution: 400 mL    Solution: 200 mL    TPN (Total Parenteral Nutrition): 1992 mL  Total IN: 3212.4 mL    OUT:    Indwelling Catheter - Urethral: 595 mL    Nasoenteral Tube: 75 mL    Voided: 700 mL  Total OUT: 1370 mL    Total NET: 1842.4 mL      02-06 @ 07:01  -  02-07 @ 01:36  --------------------------------------------------------  IN:    fat emulsion (Fish Oil and Plant Based) 20% Infusion: 166.4 mL    Solution: 150 mL    TPN (Total Parenteral Nutrition): 1411 mL  Total IN: 1727.4 mL    OUT:    Intermittent Catheterization - Urethral: 700 mL    Voided: 850 mL  Total OUT: 1550 mL    Total NET: 177.4 mL          02-07    137  |  100  |  23  ----------------------------<  113<H>  4.0   |  26  |  0.35<L>    Ca    8.0<L>      07 Feb 2020 00:15  Phos  3.1     02-07  Mg     1.9     02-07    TPro  5.9<L>  /  Alb  2.5<L>  /  TBili  1.4<H>  /  DBili  0.7<H>  /  AST  17  /  ALT  24  /  AlkPhos  81  02-07    [ ] Martinez catheter, indication: N/A  Meds: ergocalciferol 47311 Unit(s) Oral every week  fat emulsion (Fish Oil and Plant Based) 20% Infusion 20.8 mL/Hr IV Continuous <Continuous>  Parenteral Nutrition - Adult 1 Each TPN Continuous <Continuous>        HEMATOLOGIC  Meds: enoxaparin Injectable 40 milliGRAM(s) SubCutaneous daily    [x] VTE Prophylaxis                        7.6    13.49 )-----------( 265      ( 07 Feb 2020 00:15 )             23.5       Transfusion     [ ] PRBC   [ ] Platelets   [ ] FFP   [ ] Cryoprecipitate      INFECTIOUS DISEASES  WBC Count: 13.49 K/uL (02-07 @ 00:15)  WBC Count: 13.55 K/uL (02-06 @ 08:55)  WBC Count: 13.58 K/uL (02-06 @ 03:58)    ACCESS DEVICES:  [x] Peripheral IV  [ ] Central Venous Line	[ ] R	[ ] L	[ ] IJ	[ ] Fem	[ ] SC	Placed:   [ ] Arterial Line		[ ] R	[ ] L	[ ] Fem	[ ] Rad	[ ] Ax	Placed:   [ ] PICC:					[ ] Mediport  [ ] Urinary Catheter, Date Placed:   [x] Necessity of urinary, arterial, and venous catheters discussed    OTHER MEDICATIONS:  benzocaine 15 mG/menthol 3.6 mG (Sugar-Free) Lozenge 1 Lozenge Oral every 4 hours PRN  chlorhexidine 2% Cloths 1 Application(s) Topical <User Schedule>  nystatin/triamcinolone Cream 1 Application(s) Topical two times a day  triamcinolone 0.1% Ointment 1 Application(s) Topical two times a day      CODE STATUS: full

## 2020-02-07 NOTE — PROGRESS NOTE ADULT - SUBJECTIVE AND OBJECTIVE BOX
Follow-up Pulm Progress Note    c/o RLQ incisional pain with coughing   95% on 2L NC    Medications:  MEDICATIONS  (STANDING):  albuterol/ipratropium for Nebulization. 3 milliLiter(s) Nebulizer every 6 hours  buDESOnide    Inhalation Suspension 0.5 milliGRAM(s) Inhalation every 12 hours  chlorhexidine 2% Cloths 1 Application(s) Topical <User Schedule>  enoxaparin Injectable 40 milliGRAM(s) SubCutaneous daily  ergocalciferol 08597 Unit(s) Oral every week  fat emulsion (Fish Oil and Plant Based) 20% Infusion 20.8 mL/Hr (20.8 mL/Hr) IV Continuous <Continuous>  guaiFENesin  milliGRAM(s) Oral every 12 hours  nystatin/triamcinolone Cream 1 Application(s) Topical two times a day  pantoprazole  Injectable 40 milliGRAM(s) IV Push daily  Parenteral Nutrition - Adult 1 Each (83 mL/Hr) TPN Continuous <Continuous>  Parenteral Nutrition - Adult 1 Each (83 mL/Hr) TPN Continuous <Continuous>  tamsulosin 0.4 milliGRAM(s) Oral at bedtime  triamcinolone 0.1% Ointment 1 Application(s) Topical two times a day    MEDICATIONS  (PRN):  benzocaine 15 mG/menthol 3.6 mG (Sugar-Free) Lozenge 1 Lozenge Oral every 4 hours PRN Sore Throat  HYDROmorphone  Injectable 0.5 milliGRAM(s) IV Push every 3 hours PRN Severe Pain (7 - 10)          Vital Signs Last 24 Hrs  T(C): 36.6 (2020 07:00), Max: 37.1 (2020 15:00)  T(F): 97.9 (2020 07:00), Max: 98.8 (2020 15:00)  HR: 95 (2020 13:00) (86 - 108)  BP: 115/56 (2020 13:00) (99/55 - 152/66)  BP(mean): 80 (2020 13:00) (72 - 113)  RR: 24 (2020 13:00) (18 - 32)  SpO2: 97% (2020 13:00) (92% - 100%) on 2L NC          02-06 @ 07:01  -   @ 07:00  --------------------------------------------------------  IN: 2462.4 mL / OUT: 2000 mL / NET: 462.4 mL          LABS:                        7.6    13.49 )-----------( 265      ( 2020 00:15 )             23.5     02-    137  |  100  |  23  ----------------------------<  113<H>  4.0   |  26  |  0.35<L>    Ca    8.0<L>      2020 00:15  Phos  3.1       Mg     1.9         TPro  5.9<L>  /  Alb  2.5<L>  /  TBili  1.4<H>  /  DBili  0.7<H>  /  AST  17  /  ALT  24  /  AlkPhos  81  02-07          CAPILLARY BLOOD GLUCOSE          Urinalysis Basic - ( 2020 19:08 )    Color: Yellow / Appearance: Clear / S.034 / pH: x  Gluc: x / Ketone: Negative  / Bili: Negative / Urobili: 3 mg/dL   Blood: x / Protein: Trace / Nitrite: Negative   Leuk Esterase: Negative / RBC: 0 /hpf / WBC 0 /HPF   Sq Epi: x / Non Sq Epi: 2 /hpf / Bacteria: Occasional            Physical Examination:  PULM: Diminished BS throughout   CVS: S1, S2 heard    RADIOLOGY REVIEWED  CXR: L basilar atelectasis  L costophrenic angle off film   Grossly clear

## 2020-02-07 NOTE — PROGRESS NOTE ADULT - ATTENDING COMMENTS
pt seen and examined.  agree with above  pod 3 s/p xlap, extensive MIRYAM, closure of end ileostomy/MF  now with melena  cont npo, ivf, tpn  f/u serial H/H, tx prn  cont ppi  may need GI f/u for EGD +/- cscope if concern for anastomotic bleed  cont supportive care per SICU  appreciate med/pulm/cv f/u   pt remains in guarded condition  d/w SICU team in detail.

## 2020-02-07 NOTE — PROGRESS NOTE ADULT - PROBLEM SELECTOR PLAN 1
s/p ex lap, MIRYAM, closure of enterotomy, abthera vac placement  s/p washout  s/p Ileostomy reversal   NGT removed. ICE chips. Advance as tolerated   pain control   s/p PICC line placement . On TPN

## 2020-02-07 NOTE — PROGRESS NOTE ADULT - ASSESSMENT
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 Complicated hospital course including spontaneous L PTX (12/15) likely 2nd bleb rupture with CT placement. Eventual L VATS with Pleurex placement (1/10) then re-op L VATS with partial decortication (1/15) and evacuation of hemothorax. Then s/p ex-lap, MIRYAM and ileostomy reversal 2/4. Called to eval for COPD and future mgmt.

## 2020-02-07 NOTE — PROGRESS NOTE ADULT - ATTENDING COMMENTS
Patient seen and examined and agree with above.   POD# 3 s/p reversal of ileostomy with side to side ileocolic anastamosis  Had multiple bowel movements throughout the night.   Respiratory insufficiency - on nasal cannula 2L/min with SpO2 97%  COPD- continue pulmicort at this time   Hemodynamically appropriate   Gi - continue TPN at this time and diet advanced   Leukocytosis - stable   Urinary retention -had to be straight cathed several times, urine culture sent Patient seen and examined and agree with above.   POD# 3 s/p reversal of ileostomy with side to side ileocolic anastamosis  Had multiple bowel movements throughout the night. Will continue to monitor closely. Likely due to initial postoperative bowel function.   Respiratory insufficiency - on nasal cannula 2L/min with SpO2 97%  COPD- continue pulmicort at this time   Hemodynamically appropriate   Gi - continue TPN at this time and diet advanced   Leukocytosis - stable   Urinary retention -had to be straight cathed several times, urine culture sent

## 2020-02-07 NOTE — PROGRESS NOTE ADULT - ASSESSMENT
74 y/o M presenting with septic shock and high grade SBO s/p exploratory laparotomy, lysis of adhesions, decompression of bowel via enterotomy w/ primary repair, and Abthera VAC placement on 12/6; s/p take down of Abthera, washout and re-application of the Abthera vac on 12/8. 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 s/p L chest tube placement by IR 12/30 for pleural effusion now s/p VATS, with chest tube insertion for drainage of pleural effusion. RRT called 1/13 AM for hypoxia, patient transferred back to SICU.  Patient continued SOB, chest drain possible obstruction expanding. Patent taken back to OR emergently 1/15 for clot evac and VATS. Patient is now s/p Ileostomy reversal 2/4/20.    PLAN:  - f/u labs  - cont npo, ivf, tpn  - OOB to chair  - await return of GI fxn   - Appreciate continued SICU care    [facility]  [id number]

## 2020-02-07 NOTE — PROGRESS NOTE ADULT - SUBJECTIVE AND OBJECTIVE BOX
[facility] NUTRITION SUPPORT / TPN -- FOLLOW UP NOTE  --------------------------------------------------------------------------------    24 hour events/subjective:    Pt POD#3 slowly feeling better  (+)Multiple loose BM with gas staring early this am  Diet advanced to clears-     has only tried & tolerated sips of water at time of this exam  Pain appropriate - relief w/ pain Rx  Continued Urinary retention- requiring straight cath- Cx sent  No n/v  No cough/ cp/ palp/dyspnea/ sob  No f/c/s      Diet:  Diet, Clear Liquid:   Supplement Feeding Modality:  Oral  Ensure Clear Cans or Servings Per Day:  2       Frequency:  Three Times a day (02-07-20 @ 08:46)      Appetite: [ x ]Poor [  ]Adequate [  ]Good  Caloric intake:  [ x  ]  Adequate   [   ] Inadequate    ROS: General/ GI see HPI  all other systems negative      ALLERGIES & MEDICATIONS  --------------------------------------------------------------------------------  ALLERGIES  IV Contrast (Hives)     STANDING INPATIENT MEDICATIONS    acetaminophen  IVPB .. 1000 milliGRAM(s) IV Intermittent every 6 hours  acetylcysteine 20%  Inhalation 4 milliLiter(s) Inhalation three times a day  albuterol/ipratropium for Nebulization. 3 milliLiter(s) Nebulizer every 6 hours  buDESOnide    Inhalation Suspension 0.5 milliGRAM(s) Inhalation every 12 hours  chlorhexidine 2% Cloths 1 Application(s) Topical <User Schedule>  enoxaparin Injectable 40 milliGRAM(s) SubCutaneous daily  ergocalciferol 46367 Unit(s) Oral every week  fat emulsion (Fish Oil and Plant Based) 20% Infusion 20.8 mL/Hr IV Continuous <Continuous>  nystatin/triamcinolone Cream 1 Application(s) Topical two times a day  pantoprazole  Injectable 40 milliGRAM(s) IV Push daily  Parenteral Nutrition - Adult 1 Each TPN Continuous <Continuous>  Parenteral Nutrition - Adult 1 Each TPN Continuous <Continuous>  tamsulosin 0.4 milliGRAM(s) Oral at bedtime  triamcinolone 0.1% Ointment 1 Application(s) Topical two times a day      PRN INPATIENT MEDICATION  benzocaine 15 mG/menthol 3.6 mG (Sugar-Free) Lozenge 1 Lozenge Oral every 4 hours PRN  HYDROmorphone  Injectable 0.5 milliGRAM(s) IV Push every 3 hours PRN        VITALS/PHYSICAL EXAM  --------------------------------------------------------------------------------  T(C): 36.6 (02-07-20 @ 07:00), Max: 37.1 (02-06-20 @ 15:00)  HR: 97 (02-07-20 @ 11:00) (86 - 108)  BP: 141/71 (02-07-20 @ 11:00) (99/55 - 152/66)  RR: 28 (02-07-20 @ 11:00) (16 - 32)  SpO2: 97% (02-07-20 @ 11:00) (92% - 100%)  Wt(kg): --        02-06-20 @ 07:01  -  02-07-20 @ 07:00  --------------------------------------------------------  IN: 2462.4 mL / OUT: 2000 mL / NET: 462.4 mL    02-07-20 @ 07:01  -  02-07-20 @ 11:39  --------------------------------------------------------  IN: 249 mL / OUT: 350 mL / NET: -101 mL    PHYSICAL EXAM:  --------------------------------------------------------------------------------  	Gen: guarded but stable, A&Ox3, NC O2, NGT  	HEENT: NC/AT, PERRL, supple neck, trachea midline, mucosa moist              GI: (+) BS, softly distended, appropriately tender                    midline dressing w/ OR dressing with  stable dry drainage                    Rt sided  ostomy site pink moist granular              MSK: FROM x4, no contractures  	Vascular: Equally Warm,  no edema,  no clubbing, cyanosis,                      RUE PICC w/o sx infection   	Neuro: No focal deficits, intact sensation, weakened strength BLE>BUE  	Psych: Normal affect and mood              skin: moist, erythematous pinpoint rash chest/ back, groin          LABS/ CULTURES/ RADIOLOGY:              7.6    13.49 >-----------<  265      [02-07-20 @ 00:15]              23.5     137  |  100  |  23  ----------------------------<  113      [02-07-20 @ 00:15]  4.0   |  26  |  0.35        Ca     8.0     [02-07-20 @ 00:15]      iCa    1.12     [02-07 @ 00:13]      Mg     1.9     [02-07-20 @ 00:15]      Phos  3.1     [02-07-20 @ 00:15]    TPro  5.9  /  Alb  2.5  /  TBili  1.4  /  DBili  0.7  /  AST  17  /  ALT  24  /  AlkPhos  81  [02-07-20 @ 00:15]      Prealbumin, Serum: 21 mg/dL (02-03-20 @ 23:50)  Prealbumin, Serum: 19 mg/dL (02-03-20 @ 07:18)  Prealbumin, Serum: 18 mg/dL (02-02-20 @ 09:48)  Prealbumin, Serum: 18 mg/dL (01-31-20 @ 07:40)  Prealbumin, Serum: 19 mg/dL (01-27-20 @ 04:52)      Triglycerides, Serum: 59 mg/dL (02.03.20 @ 00:24)  Triglycerides, Serum: 61 mg/dL (02.02.20 @ 01:41)  Triglycerides, Serum: 51 mg/dL (01.31.20 @ 04:10)  Triglycerides, Serum: 83 mg/dL (01.27.20 @ 00:14)  Triglycerides, Serum: 50 mg/dL (01.07.20 @ 01:05)      Urinalysis (02.06.20 @ 19:08)    pH Urine: 6.0    Glucose Qualitative, Urine: Negative    Blood, Urine: Negative    Color: Yellow    Urine Appearance: Clear    Bilirubin: Negative    Ketone - Urine: Negative    Specific Gravity: 1.034    Protein, Urine: Trace    Urobilinogen: 3 mg/dL    Nitrite: Negative    Leukocyte Esterase Concentration: Negative

## 2020-02-07 NOTE — PROGRESS NOTE ADULT - ASSESSMENT
75 y/o male presenting with high grade SBO s/p exploratory laparotomy, lysis of adhesions, decompression of bowel via enterotomy w/ primary repair, & Abthera VAC placement (12/06/2019); RTOR for re-exploration w/ Abthera VAC replacement (12/08/2019) to allow for demarcation of ischemic small bowel; RTOR for re-exploration, small bowel resection of 150 cm (150 cm remaining), ileocecetomy, end ileostomy, mucous fistula, and abdominal closure (12/10/2019); hospital course complicated by SVT, short bowel syndrome requiring repletions w/ IV fluids, malnutrition requiring TPN, intermittent episodes of hypotension, spontaneous left pneumothorax s/p pigtail catheter (12/15/2019-12/24/2019), left pleural effusion s/p pigtail catheter w/ IR (12/30/2019) & subsequent placement of Pleur-X catheter (1/10/2020) c/b left hemothorax s/p left VATS, dysphagia, and oral HSV lesions; now s/p ileostomy reversal    PLAN:    Neuro: acute post-op pain  - Monitor mental status  - Pain control as needed with IV acetaminophen and Dilaudid    Resp: COPD, spontaneous left pneumothorax s/p pigtail catheter, left pleural effusion s/p Pleur-X catheter c/b hemothorax s/p left VATS  - Monitor pulse oximeter  - Pulmicort and Duoneb for COPD  - To follow w/ pulmonologist Dr. Curry after discharge    CV: SVT  - Monitor vital signs    GI: s/p exploratory laparotomy, [place], decompression of bowel via enterotomy w/ primary repair, & Abthera VAC placement (12/06/2019); re-exploration w/ ABthera VAC replacement (12/08/2019); s/p re-exploration, small bowel resection of 150 cm (150 cm remaining), ileocecectomy end ileostomy, mucous fistula, & abdominal closure (12/10/2019); s/p ileostomy reversal (2/4/2020); short bowel syndrome, malnutrition, dysphagia  - NGT discontinued, on sips/chips  - No GI function yet  - Protonix to decrease gastric secretions    Renal: urinary retention, polyuria (resolved)  - Monitor I&Os  - Monitor electrolytes and replete as necessary  - intermittent retention, but voiding independently  - Doxazosin for urinary retention      Heme: anemia likely secondary to malnutrition / malabsorption  - Monitor CBC and coags  - Lovenox for VTE prophylaxis    ID: oral HSV lesions (s/p acyclovir 12/23/2019-01/02/2020), Pseudomonas PNA (s/p meropenem 1/19/2020-1/26/2020)  - Monitor for clinical evidence of active infection    Endo: no acute issues  - Monitor glucose q6hrs while on TPN    Disposition:  - Full code  - Stable for transfer to floors 75 y/o male presenting with high grade SBO s/p exploratory laparotomy, lysis of adhesions, decompression of bowel via enterotomy w/ primary repair, & Abthera VAC placement (12/06/2019); RTOR for re-exploration w/ Abthera VAC replacement (12/08/2019) to allow for demarcation of ischemic small bowel; RTOR for re-exploration, small bowel resection of 150 cm (150 cm remaining), ileocecetomy, end ileostomy, mucous fistula, and abdominal closure (12/10/2019); hospital course complicated by SVT, short bowel syndrome requiring repletions w/ IV fluids, malnutrition requiring TPN, intermittent episodes of hypotension, spontaneous left pneumothorax s/p pigtail catheter (12/15/2019-12/24/2019), left pleural effusion s/p pigtail catheter w/ IR (12/30/2019) & subsequent placement of Pleur-X catheter (1/10/2020) c/b left hemothorax s/p left VATS, dysphagia, and oral HSV lesions; now s/p ileostomy reversal    PLAN:    Neuro: acute post-op pain  - Monitor mental status  - Pain control as needed with IV acetaminophen and Dilaudid    Resp: COPD, spontaneous left pneumothorax s/p pigtail catheter, left pleural effusion s/p Pleur-X catheter c/b hemothorax s/p left VATS  - Monitor pulse oximeter  - Pulmicort and Duoneb for COPD  - To follow w/ pulmonologist Dr. Curry after discharge    CV: SVT  - Monitor vital signs    GI: s/p exploratory laparotomy, [place], decompression of bowel via enterotomy w/ primary repair, & Abthera VAC placement (12/06/2019); re-exploration w/ ABthera VAC replacement (12/08/2019); s/p re-exploration, small bowel resection of 150 cm (150 cm remaining), ileocecectomy end ileostomy, mucous fistula, & abdominal closure (12/10/2019); s/p ileostomy reversal (2/4/2020); short bowel syndrome, malnutrition, dysphagia  - NGT discontinued, on sips/chips  - passed gas and had loose BM overnight  - Protonix to decrease gastric secretions    Renal: urinary retention, polyuria (resolved)  - Monitor I&Os  - Monitor electrolytes and replete as necessary  - intermittent retention, but voiding independently  - Doxazosin for urinary retention      Heme: anemia likely secondary to malnutrition / malabsorption  - Monitor CBC and coags  - Lovenox for VTE prophylaxis    ID: oral HSV lesions (s/p acyclovir 12/23/2019-01/02/2020), Pseudomonas PNA (s/p meropenem 1/19/2020-1/26/2020)  - Monitor for clinical evidence of active infection    Endo: no acute issues  - Monitor glucose q6hrs while on TPN    Disposition:  - Full code  - Stable for transfer to floors

## 2020-02-07 NOTE — PROGRESS NOTE ADULT - SUBJECTIVE AND OBJECTIVE BOX
GENERAL SURGERY PROGRESS NOTE    SUBJECTIVE/24 HOUR EVENTS:   - NGT discontinued, sips/chips started  - passed gas and had loose BM overnight  - Overnight, UA returned w/ "occasional bacteria"  - Ucx sent  - Patient seen and examined at bedside  - Patient feels well, on TPN, oob/ambi, pain controlled, denies f/c/n/v/d/sob    OBJECTIVE  PHYSICAL EXAM:  General: Appears well, NAD  Neuro: AAOx3  CHEST: Clear to auscultation bilaterally,  CV: Regular rate and rhythm  Abdomen: soft, nontender, nondistended, no rebound or guarding, dressing c/d/i  Extremities: Grossly symmetric    V/S:  Vital Signs Last 24 Hrs  T(C): 36.6 (2020 07:00), Max: 37.1 (2020 15:00)  T(F): 97.9 (2020 07:00), Max: 98.8 (2020 15:00)  HR: 92 (2020 10:00) (86 - 108)  BP: 143/68 (2020 10:00) (99/55 - 152/66)  BP(mean): 98 (2020 10:00) (72 - 113)  RR: 23 (2020 10:00) (16 - 32)  SpO2: 97% (2020 10:00) (92% - 100%)    --------------------------------------------------------------------------------------------------  I/Os:    2020 07:01  -  2020 07:00  --------------------------------------------------------  IN:    fat emulsion (Fish Oil and Plant Based) 20% Infusion: 270.4 mL    Solution: 150 mL    Solution: 50 mL    TPN (Total Parenteral Nutrition): 1992 mL  Total IN: 2462.4 mL    OUT:    Intermittent Catheterization - Urethral: 700 mL    Voided: 1300 mL  Total OUT: 2000 mL    Total NET: 462.4 mL      2020 07:01  -  2020 11:01  --------------------------------------------------------  IN:    TPN (Total Parenteral Nutrition): 249 mL  Total IN: 249 mL    OUT:    Voided: 350 mL  Total OUT: 350 mL    Total NET: -101 mL        --------------------------------------------------------------------------------------------------  LABS:                        7.6    13.49 )-----------( 265      ( 2020 00:15 )             23.5     2020 00:15    137    |  100    |  23     ----------------------------<  113    4.0     |  26     |  0.35     Ca    8.0        2020 00:15  Phos  3.1       2020 00:15  Mg     1.9       2020 00:15    TPro  5.9    /  Alb  2.5    /  TBili  1.4    /  DBili  0.7    /  AST  17     /  ALT  24     /  AlkPhos  81     2020 00:15      CAPILLARY BLOOD GLUCOSE            LIVER FUNCTIONS - ( 2020 00:15 )  Alb: 2.5 g/dL / Pro: 5.9 g/dL / ALK PHOS: 81 U/L / ALT: 24 U/L / AST: 17 U/L / GGT: x             Urinalysis Basic - ( 2020 19:08 )    Color: Yellow / Appearance: Clear / S.034 / pH: x  Gluc: x / Ketone: Negative  / Bili: Negative / Urobili: 3 mg/dL   Blood: x / Protein: Trace / Nitrite: Negative   Leuk Esterase: Negative / RBC: 0 /hpf / WBC 0 /HPF   Sq Epi: x / Non Sq Epi: 2 /hpf / Bacteria: Occasional      --------------------------------------------------------------------------------------------------  MEDICATIONS  (STANDING):  acetaminophen  IVPB .. 1000 milliGRAM(s) IV Intermittent every 6 hours  acetylcysteine 20%  Inhalation 4 milliLiter(s) Inhalation three times a day  albuterol/ipratropium for Nebulization. 3 milliLiter(s) Nebulizer every 6 hours  buDESOnide    Inhalation Suspension 0.5 milliGRAM(s) Inhalation every 12 hours  chlorhexidine 2% Cloths 1 Application(s) Topical <User Schedule>  enoxaparin Injectable 40 milliGRAM(s) SubCutaneous daily  ergocalciferol 65255 Unit(s) Oral every week  fat emulsion (Fish Oil and Plant Based) 20% Infusion 20.8 mL/Hr (20.8 mL/Hr) IV Continuous <Continuous>  nystatin/triamcinolone Cream 1 Application(s) Topical two times a day  pantoprazole  Injectable 40 milliGRAM(s) IV Push daily  Parenteral Nutrition - Adult 1 Each (83 mL/Hr) TPN Continuous <Continuous>  tamsulosin 0.4 milliGRAM(s) Oral at bedtime  triamcinolone 0.1% Ointment 1 Application(s) Topical two times a day    MEDICATIONS  (PRN):  benzocaine 15 mG/menthol 3.6 mG (Sugar-Free) Lozenge 1 Lozenge Oral every 4 hours PRN Sore Throat  HYDROmorphone  Injectable 0.5 milliGRAM(s) IV Push every 3 hours PRN Severe Pain (7 - 10)

## 2020-02-08 DIAGNOSIS — K92.1 MELENA: ICD-10-CM

## 2020-02-08 DIAGNOSIS — K92.2 GASTROINTESTINAL HEMORRHAGE, UNSPECIFIED: ICD-10-CM

## 2020-02-08 LAB
ALBUMIN SERPL ELPH-MCNC: 2.5 G/DL — LOW (ref 3.3–5)
ALBUMIN SERPL ELPH-MCNC: 2.6 G/DL — LOW (ref 3.3–5)
ALP SERPL-CCNC: 102 U/L — SIGNIFICANT CHANGE UP (ref 40–120)
ALP SERPL-CCNC: 95 U/L — SIGNIFICANT CHANGE UP (ref 40–120)
ALT FLD-CCNC: 25 U/L — SIGNIFICANT CHANGE UP (ref 10–45)
ALT FLD-CCNC: 34 U/L — SIGNIFICANT CHANGE UP (ref 10–45)
ANION GAP SERPL CALC-SCNC: 11 MMOL/L — SIGNIFICANT CHANGE UP (ref 5–17)
ANION GAP SERPL CALC-SCNC: 9 MMOL/L — SIGNIFICANT CHANGE UP (ref 5–17)
APTT BLD: 38.2 SEC — HIGH (ref 27.5–36.3)
AST SERPL-CCNC: 17 U/L — SIGNIFICANT CHANGE UP (ref 10–40)
AST SERPL-CCNC: 26 U/L — SIGNIFICANT CHANGE UP (ref 10–40)
BILIRUB DIRECT SERPL-MCNC: 0.3 MG/DL — HIGH (ref 0–0.2)
BILIRUB DIRECT SERPL-MCNC: 0.4 MG/DL — HIGH (ref 0–0.2)
BILIRUB INDIRECT FLD-MCNC: 0.3 MG/DL — SIGNIFICANT CHANGE UP (ref 0.2–1)
BILIRUB INDIRECT FLD-MCNC: 0.4 MG/DL — SIGNIFICANT CHANGE UP (ref 0.2–1)
BILIRUB SERPL-MCNC: 0.6 MG/DL — SIGNIFICANT CHANGE UP (ref 0.2–1.2)
BILIRUB SERPL-MCNC: 0.8 MG/DL — SIGNIFICANT CHANGE UP (ref 0.2–1.2)
BUN SERPL-MCNC: 16 MG/DL — SIGNIFICANT CHANGE UP (ref 7–23)
BUN SERPL-MCNC: 19 MG/DL — SIGNIFICANT CHANGE UP (ref 7–23)
CA-I BLD-SCNC: 1.13 MMOL/L — SIGNIFICANT CHANGE UP (ref 1.12–1.3)
CA-I BLD-SCNC: 1.15 MMOL/L — SIGNIFICANT CHANGE UP (ref 1.12–1.3)
CALCIUM SERPL-MCNC: 8 MG/DL — LOW (ref 8.4–10.5)
CALCIUM SERPL-MCNC: 8.2 MG/DL — LOW (ref 8.4–10.5)
CHLORIDE SERPL-SCNC: 102 MMOL/L — SIGNIFICANT CHANGE UP (ref 96–108)
CHLORIDE SERPL-SCNC: 102 MMOL/L — SIGNIFICANT CHANGE UP (ref 96–108)
CO2 SERPL-SCNC: 23 MMOL/L — SIGNIFICANT CHANGE UP (ref 22–31)
CO2 SERPL-SCNC: 23 MMOL/L — SIGNIFICANT CHANGE UP (ref 22–31)
CORTICOSTEROID BINDING GLOBULIN RESULT: 2.1 MG/DL — SIGNIFICANT CHANGE UP
CORTIS F/TOTAL MFR SERPL: 50 % — SIGNIFICANT CHANGE UP
CORTIS SERPL-MCNC: 28 UG/DL — HIGH
CORTISOL, FREE RESULT: 14 UG/DL — HIGH
CREAT SERPL-MCNC: 0.32 MG/DL — LOW (ref 0.5–1.3)
CREAT SERPL-MCNC: 0.33 MG/DL — LOW (ref 0.5–1.3)
CULTURE RESULTS: NO GROWTH — SIGNIFICANT CHANGE UP
GLUCOSE SERPL-MCNC: 112 MG/DL — HIGH (ref 70–99)
GLUCOSE SERPL-MCNC: 124 MG/DL — HIGH (ref 70–99)
HCT VFR BLD CALC: 21 % — CRITICAL LOW (ref 39–50)
HCT VFR BLD CALC: 22.1 % — LOW (ref 39–50)
HCT VFR BLD CALC: 23.7 % — LOW (ref 39–50)
HCT VFR BLD CALC: 25.8 % — LOW (ref 39–50)
HCT VFR BLD CALC: 25.9 % — LOW (ref 39–50)
HGB BLD-MCNC: 6.6 G/DL — CRITICAL LOW (ref 13–17)
HGB BLD-MCNC: 6.7 G/DL — CRITICAL LOW (ref 13–17)
HGB BLD-MCNC: 7.2 G/DL — LOW (ref 13–17)
HGB BLD-MCNC: 8.1 G/DL — LOW (ref 13–17)
HGB BLD-MCNC: 8.2 G/DL — LOW (ref 13–17)
INR BLD: 1.13 RATIO — SIGNIFICANT CHANGE UP (ref 0.88–1.16)
MAGNESIUM SERPL-MCNC: 1.8 MG/DL — SIGNIFICANT CHANGE UP (ref 1.6–2.6)
MAGNESIUM SERPL-MCNC: 1.9 MG/DL — SIGNIFICANT CHANGE UP (ref 1.6–2.6)
MCHC RBC-ENTMCNC: 28.3 GM/DL — LOW (ref 32–36)
MCHC RBC-ENTMCNC: 29.5 PG — SIGNIFICANT CHANGE UP (ref 27–34)
MCHC RBC-ENTMCNC: 30 PG — SIGNIFICANT CHANGE UP (ref 27–34)
MCHC RBC-ENTMCNC: 31.4 GM/DL — LOW (ref 32–36)
MCHC RBC-ENTMCNC: 31.4 GM/DL — LOW (ref 32–36)
MCHC RBC-ENTMCNC: 31.7 GM/DL — LOW (ref 32–36)
MCHC RBC-ENTMCNC: 32.6 GM/DL — SIGNIFICANT CHANGE UP (ref 32–36)
MCV RBC AUTO: 104.4 FL — HIGH (ref 80–100)
MCV RBC AUTO: 92.1 FL — SIGNIFICANT CHANGE UP (ref 80–100)
MCV RBC AUTO: 93.2 FL — SIGNIFICANT CHANGE UP (ref 80–100)
MCV RBC AUTO: 93.8 FL — SIGNIFICANT CHANGE UP (ref 80–100)
MCV RBC AUTO: 93.8 FL — SIGNIFICANT CHANGE UP (ref 80–100)
NRBC # BLD: 0 /100 WBCS — SIGNIFICANT CHANGE UP (ref 0–0)
PHOSPHATE SERPL-MCNC: 3.3 MG/DL — SIGNIFICANT CHANGE UP (ref 2.5–4.5)
PHOSPHATE SERPL-MCNC: 3.3 MG/DL — SIGNIFICANT CHANGE UP (ref 2.5–4.5)
PLATELET # BLD AUTO: 239 K/UL — SIGNIFICANT CHANGE UP (ref 150–400)
PLATELET # BLD AUTO: 280 K/UL — SIGNIFICANT CHANGE UP (ref 150–400)
PLATELET # BLD AUTO: 283 K/UL — SIGNIFICANT CHANGE UP (ref 150–400)
PLATELET # BLD AUTO: 293 K/UL — SIGNIFICANT CHANGE UP (ref 150–400)
PLATELET # BLD AUTO: 300 K/UL — SIGNIFICANT CHANGE UP (ref 150–400)
POTASSIUM SERPL-MCNC: 3.9 MMOL/L — SIGNIFICANT CHANGE UP (ref 3.5–5.3)
POTASSIUM SERPL-MCNC: 4.1 MMOL/L — SIGNIFICANT CHANGE UP (ref 3.5–5.3)
POTASSIUM SERPL-SCNC: 3.9 MMOL/L — SIGNIFICANT CHANGE UP (ref 3.5–5.3)
POTASSIUM SERPL-SCNC: 4.1 MMOL/L — SIGNIFICANT CHANGE UP (ref 3.5–5.3)
PROT SERPL-MCNC: 6.3 G/DL — SIGNIFICANT CHANGE UP (ref 6–8.3)
PROT SERPL-MCNC: 6.4 G/DL — SIGNIFICANT CHANGE UP (ref 6–8.3)
PROTHROM AB SERPL-ACNC: 13 SEC — HIGH (ref 10–12.9)
RBC # BLD: 2.24 M/UL — LOW (ref 4.2–5.8)
RBC # BLD: 2.27 M/UL — LOW (ref 4.2–5.8)
RBC # BLD: 2.4 M/UL — LOW (ref 4.2–5.8)
RBC # BLD: 2.75 M/UL — LOW (ref 4.2–5.8)
RBC # BLD: 2.78 M/UL — LOW (ref 4.2–5.8)
RBC # FLD: 14.3 % — SIGNIFICANT CHANGE UP (ref 10.3–14.5)
RBC # FLD: 14.3 % — SIGNIFICANT CHANGE UP (ref 10.3–14.5)
RBC # FLD: 14.6 % — HIGH (ref 10.3–14.5)
RBC # FLD: 14.6 % — HIGH (ref 10.3–14.5)
RBC # FLD: 15.2 % — HIGH (ref 10.3–14.5)
SODIUM SERPL-SCNC: 134 MMOL/L — LOW (ref 135–145)
SODIUM SERPL-SCNC: 136 MMOL/L — SIGNIFICANT CHANGE UP (ref 135–145)
SPECIMEN SOURCE: SIGNIFICANT CHANGE UP
WBC # BLD: 10.1 K/UL — SIGNIFICANT CHANGE UP (ref 3.8–10.5)
WBC # BLD: 12.85 K/UL — HIGH (ref 3.8–10.5)
WBC # BLD: 7.26 K/UL — SIGNIFICANT CHANGE UP (ref 3.8–10.5)
WBC # BLD: 8.6 K/UL — SIGNIFICANT CHANGE UP (ref 3.8–10.5)
WBC # BLD: 8.8 K/UL — SIGNIFICANT CHANGE UP (ref 3.8–10.5)
WBC # FLD AUTO: 10.1 K/UL — SIGNIFICANT CHANGE UP (ref 3.8–10.5)
WBC # FLD AUTO: 12.85 K/UL — HIGH (ref 3.8–10.5)
WBC # FLD AUTO: 7.26 K/UL — SIGNIFICANT CHANGE UP (ref 3.8–10.5)
WBC # FLD AUTO: 8.6 K/UL — SIGNIFICANT CHANGE UP (ref 3.8–10.5)
WBC # FLD AUTO: 8.8 K/UL — SIGNIFICANT CHANGE UP (ref 3.8–10.5)

## 2020-02-08 PROCEDURE — 99233 SBSQ HOSP IP/OBS HIGH 50: CPT

## 2020-02-08 PROCEDURE — 99232 SBSQ HOSP IP/OBS MODERATE 35: CPT | Mod: GC

## 2020-02-08 RX ORDER — PANTOPRAZOLE SODIUM 20 MG/1
80 TABLET, DELAYED RELEASE ORAL ONCE
Refills: 0 | Status: COMPLETED | OUTPATIENT
Start: 2020-02-08 | End: 2020-02-08

## 2020-02-08 RX ORDER — ELECTROLYTE SOLUTION,INJ
1 VIAL (ML) INTRAVENOUS
Refills: 0 | Status: DISCONTINUED | OUTPATIENT
Start: 2020-02-08 | End: 2020-02-08

## 2020-02-08 RX ORDER — MAGNESIUM SULFATE 500 MG/ML
2 VIAL (ML) INJECTION ONCE
Refills: 0 | Status: COMPLETED | OUTPATIENT
Start: 2020-02-08 | End: 2020-02-08

## 2020-02-08 RX ORDER — POTASSIUM CHLORIDE 20 MEQ
20 PACKET (EA) ORAL ONCE
Refills: 0 | Status: COMPLETED | OUTPATIENT
Start: 2020-02-08 | End: 2020-02-08

## 2020-02-08 RX ORDER — PANTOPRAZOLE SODIUM 20 MG/1
8 TABLET, DELAYED RELEASE ORAL
Qty: 80 | Refills: 0 | Status: DISCONTINUED | OUTPATIENT
Start: 2020-02-08 | End: 2020-02-12

## 2020-02-08 RX ORDER — I.V. FAT EMULSION 20 G/100ML
20.8 EMULSION INTRAVENOUS
Qty: 50 | Refills: 0 | Status: DISCONTINUED | OUTPATIENT
Start: 2020-02-08 | End: 2020-02-09

## 2020-02-08 RX ADMIN — PANTOPRAZOLE SODIUM 40 MILLIGRAM(S): 20 TABLET, DELAYED RELEASE ORAL at 17:45

## 2020-02-08 RX ADMIN — PANTOPRAZOLE SODIUM 40 MILLIGRAM(S): 20 TABLET, DELAYED RELEASE ORAL at 06:14

## 2020-02-08 RX ADMIN — Medication 1 APPLICATION(S): at 17:48

## 2020-02-08 RX ADMIN — Medication 0.5 MILLIGRAM(S): at 05:32

## 2020-02-08 RX ADMIN — Medication 400 MILLIGRAM(S): at 08:33

## 2020-02-08 RX ADMIN — Medication 3 MILLILITER(S): at 05:32

## 2020-02-08 RX ADMIN — Medication 50 GRAM(S): at 03:13

## 2020-02-08 RX ADMIN — CHLORHEXIDINE GLUCONATE 1 APPLICATION(S): 213 SOLUTION TOPICAL at 06:14

## 2020-02-08 RX ADMIN — Medication 400 MILLIGRAM(S): at 17:48

## 2020-02-08 RX ADMIN — Medication 1000 MILLIGRAM(S): at 18:21

## 2020-02-08 RX ADMIN — HYDROMORPHONE HYDROCHLORIDE 0.5 MILLIGRAM(S): 2 INJECTION INTRAMUSCULAR; INTRAVENOUS; SUBCUTANEOUS at 04:29

## 2020-02-08 RX ADMIN — Medication 1000 MILLIGRAM(S): at 16:12

## 2020-02-08 RX ADMIN — Medication 1 APPLICATION(S): at 06:14

## 2020-02-08 RX ADMIN — Medication 400 MILLIGRAM(S): at 03:05

## 2020-02-08 RX ADMIN — HYDROMORPHONE HYDROCHLORIDE 0.5 MILLIGRAM(S): 2 INJECTION INTRAMUSCULAR; INTRAVENOUS; SUBCUTANEOUS at 18:21

## 2020-02-08 RX ADMIN — PANTOPRAZOLE SODIUM 80 MILLIGRAM(S): 20 TABLET, DELAYED RELEASE ORAL at 21:02

## 2020-02-08 RX ADMIN — Medication 600 MILLIGRAM(S): at 17:46

## 2020-02-08 RX ADMIN — Medication 600 MILLIGRAM(S): at 06:15

## 2020-02-08 RX ADMIN — PANTOPRAZOLE SODIUM 10 MG/HR: 20 TABLET, DELAYED RELEASE ORAL at 20:51

## 2020-02-08 RX ADMIN — Medication 1 APPLICATION(S): at 17:46

## 2020-02-08 RX ADMIN — TAMSULOSIN HYDROCHLORIDE 0.4 MILLIGRAM(S): 0.4 CAPSULE ORAL at 21:02

## 2020-02-08 RX ADMIN — Medication 20 MILLIEQUIVALENT(S): at 03:13

## 2020-02-08 RX ADMIN — HYDROMORPHONE HYDROCHLORIDE 0.5 MILLIGRAM(S): 2 INJECTION INTRAMUSCULAR; INTRAVENOUS; SUBCUTANEOUS at 17:45

## 2020-02-08 RX ADMIN — Medication 3 MILLILITER(S): at 00:51

## 2020-02-08 NOTE — PROGRESS NOTE ADULT - ASSESSMENT
76 y/o M presenting with septic shock and high grade SBO s/p exploratory laparotomy, lysis of adhesions, decompression of bowel via enterotomy w/ primary repair, and Abthera VAC placement on 12/6; s/p take down of Abthera, washout and re-application of the Abthera vac on 12/8. 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 s/p L chest tube placement by IR 12/30 for pleural effusion now s/p VATS, with chest tube insertion for drainage of pleural effusion. RRT called 1/13 AM for hypoxia, patient transferred back to SICU.  Patient continued SOB, chest drain possible obstruction expanding. Patent taken back to OR emergently 1/15 for clot evac and VATS. Patient is now s/p Ileostomy reversal 2/4/20.    PLAN:  - transfuse PRBC as necessary  - Re-engage GI for melena  - Protonix b.i.d.  - advance diet as tolerated  - continue TPN, will calorie count once on regular diet  - OOB    [facility]  [id number]

## 2020-02-08 NOTE — PROGRESS NOTE ADULT - ATTENDING COMMENTS
Patient seen and examined and agree with above.   POD# 4 s/p reversal of ileostomy with side to side ileocolic anastamosis  Continues to have multiple bowel movements some with melena  H/H stable   Hemodynamically stable   Respiratory insufficiency - on nasal cannula 2L/min with SpO2 97%  COPD- continue pulmicort at this time   Hemodynamically appropriate   Gi - diet advanced

## 2020-02-08 NOTE — PROGRESS NOTE ADULT - SUBJECTIVE AND OBJECTIVE BOX
HISTORY  75y Male  COPD and EtOH dependence who presented on 12/6/2019 with abdominal pain, nausea, hematemesis, and poor PO intake for ~2-3 days. In the ED, he was found to be hypotensive & tachycardic. Labs revealed an CHI and lactic acidosis. Imaging revealed a high grade SBO in the RLQ on CT scan. Hospital course is as follows:  12/06 - s/p exploratory laparotomy, lysis of adhesions, decompression of bowel via enterotomy w/ primary repair, and Abthera VAC placement as the distal 50% of the bowel appeared dusky but was still viable, admitted to SICU post-operatively as he was on vasopressor support and was left intubated  12/08 - s/p re-exploration, proximal 165 cm of small bowel appeared pink & viable but beyond that had patchy areas of ischemia so decision was made to give the bowel more time to demarcate before resecting in order to preserve as much small bowel as possible so Abthera VAC was replaced  12/09 - s/p re-exploration, small bowel resection of 150 cm (150 cm remaining), ileocecetomy, end ileostomy, mucous fistula, and abdominal closure  12/11 - extubated to BiPAP, noted to be in SVT that was rate controlled w/ metoprolol  12/14 - started on TPN  12/15 - noted to have spontaneous left pneumothorax s/p left pigtail catheter placement, noted to be in SVT again so amiodarone started, high ileostomy output noted so concern for short bowel syndrome  12/16 - amiodarone discontinued, started on beta blocker with metoprolol  12/18 - started Lomotil and ileostomy repletions with IV fluids  12/19 - started Imodium, left pigtail catheter was placed to water seal but pneumothorax noted on follow-up CXR so placed back to suction  12/20 - started tincture of opium, concern for aspiration so changed diet from regular to dysphagia 1 pureed with nectar-thickened liquids  12/22 - left pigtail catheter placed to water seal with no pneumothorax noted on follow-up CXR, CT chest with moderate left pleural effusion not being drained by pigtail catheter  12/23 - 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  12/24 - left pigtail catheter discontinued  12/28 - started Ancef for erythematous midline wound  12/29 - beta blocker discontinued for intermittent episodes of hypotension  12/30 - left pigtail catheter placement by IR with drainage of serous transudative fluid  01/02 - FEES demonstrating penetration with thin liquids so patient kept on dysphagia 1 pureed with nectar-thickened liquids  01/07 - transferred to floors  01/10 - s/p left VATS w/ PleurX catheter placement  01/11 - evaluated by speech & swallow, advanced diet to mechanical soft with thin liquids  01/12 - PleurX catheter placed to water seal with on pneumothorax on follow-up CXR  01/13 - RRT for hypoxemia, placed on BiPAP, PleurX catheter placed back to suction, started vancomycin & Zosyn for possible HCAP  01/14 - noted to have minimal output from PleurX catheter, lung ultrasound with large left pleural effusion concerning for hemothorax, multiple episodes of bradycardia secondary to hypoxemia, remains on BiPAP  01/15 - worsening respiratory distress requiring intubation, hypotensive requiring vasopressor support, CT chest with large left hemothorax w/ trace pneumothorax & extensive subcutaneous emphysema so taken to OR emergently for left VATS, evacuation fo 2 L of clot, and placement of two left chest tubes  01/16 - extubated, weaned off vasopressor support  01/17 - two left chest tubes placed to water seal with no pneumothorax noted on follow-up CXR  01/22 - inferior chest tube discontinued with no pneumothorax noted on follow-up CXR, polyuria noted  01/24 - given DDAVP for polyuria w/ good response  01/25 - superior chest tube discontinued with no pneumothorax noted on follow-up CXR  02/04 - s/p exploratory laparotomy, lysis of adhesions, and ileostomy reversal        24 HOUR EVENTS:  - Continues to have melena however Hct remains stable.  - No overnight events    SUBJECTIVE/ROS:  [ ] A ten-point review of systems was otherwise negative except as noted.  [ ] Due to altered mental status/intubation, subjective information were not able to be obtained from the patient. History was obtained, to the extent possible, from review of the chart and collateral sources of information.      NEURO  Exam: awake, alert, oriented  Meds: acetaminophen  IVPB .. 1000 milliGRAM(s) IV Intermittent every 6 hours  HYDROmorphone  Injectable 0.5 milliGRAM(s) IV Push every 3 hours PRN Severe Pain (7 - 10)    [x] Adequacy of sedation and pain control has been assessed and adjusted      RESPIRATORY  RR: 32 (02-07-20 @ 23:00) (20 - 36)  SpO2: 97% (02-07-20 @ 23:00) (91% - 100%)  Exam: unlabored, clear to auscultation bilaterally  Mechanical Ventilation: none  [N/A] Extubation Readiness Assessed  Meds: albuterol/ipratropium for Nebulization. 3 milliLiter(s) Nebulizer every 6 hours  buDESOnide    Inhalation Suspension 0.5 milliGRAM(s) Inhalation every 12 hours  guaiFENesin  milliGRAM(s) Oral every 12 hours        CARDIOVASCULAR  HR: 99 (02-07-20 @ 23:00) (88 - 105)  BP: 153/94 (02-07-20 @ 23:00) (112/55 - 166/82)  BP(mean): 118 (02-07-20 @ 23:00) (79 - 118)  Exam: regular rate and rhythm  Cardiac Rhythm: sinus  Perfusion     [x]Adequate   [ ]Inadequate  Mentation   [x]Normal       [ ]Reduced  Extremities  [x]Warm         [ ]Cool  Volume Status [ ]Hypervolemic [x]Euvolemic [ ]Hypovolemic  Meds: tamsulosin 0.4 milliGRAM(s) Oral at bedtime        GI/NUTRITION  Exam: soft, nontender, nondistended  Diet: CLD   Meds: pantoprazole  Injectable 40 milliGRAM(s) IV Push every 12 hours      GENITOURINARY  I&O's Detail    02-06 @ 07:01  -  02-07 @ 07:00  --------------------------------------------------------  IN:    fat emulsion (Fish Oil and Plant Based) 20% Infusion: 270.4 mL    Solution: 150 mL    Solution: 50 mL    TPN (Total Parenteral Nutrition): 1992 mL  Total IN: 2462.4 mL    OUT:    Intermittent Catheterization - Urethral: 700 mL    Voided: 1300 mL  Total OUT: 2000 mL    Total NET: 462.4 mL      02-07 @ 07:01  -  02-08 @ 00:25  --------------------------------------------------------  IN:    fat emulsion (Fish Oil and Plant Based) 20% Infusion: 145.6 mL    Solution: 100 mL    TPN (Total Parenteral Nutrition): 1328 mL  Total IN: 1573.6 mL    OUT:    Voided: 1000 mL  Total OUT: 1000 mL    Total NET: 573.6 mL          02-07    137  |  100  |  23  ----------------------------<  113<H>  4.0   |  26  |  0.35<L>    Ca    8.0<L>      07 Feb 2020 00:15  Phos  3.1     02-07  Mg     1.9     02-07    TPro  5.9<L>  /  Alb  2.5<L>  /  TBili  1.4<H>  /  DBili  0.7<H>  /  AST  17  /  ALT  24  /  AlkPhos  81  02-07    [ ] Martinez catheter, indication: N/A  Meds: ergocalciferol 08243 Unit(s) Oral every week  fat emulsion (Fish Oil and Plant Based) 20% Infusion 20.8 mL/Hr IV Continuous <Continuous>  Parenteral Nutrition - Adult 1 Each TPN Continuous <Continuous>        HEMATOLOGIC  Meds: enoxaparin Injectable 40 milliGRAM(s) SubCutaneous daily    [x] VTE Prophylaxis                        7.3    12.20 )-----------( 268      ( 07 Feb 2020 15:40 )             23.3       Transfusion     [ ] PRBC   [ ] Platelets   [ ] FFP   [ ] Cryoprecipitate      INFECTIOUS DISEASES  WBC Count: 12.20 K/uL (02-07 @ 15:40)    RECENT CULTURES: none    Meds: none      ENDOCRINE  CAPILLARY BLOOD GLUCOSE        Meds: none      ACCESS DEVICES:  [x] Peripheral IV  [ ] Central Venous Line	[ ] R	[ ] L	[ ] IJ	[ ] Fem	[ ] SC	Placed:   [ ] Arterial Line		[ ] R	[ ] L	[ ] Fem	[ ] Rad	[ ] Ax	Placed:   [ ] PICC:					[ ] Mediport  [ ] Urinary Catheter, Date Placed:   [x] Necessity of urinary, arterial, and venous catheters discussed    OTHER MEDICATIONS:  benzocaine 15 mG/menthol 3.6 mG (Sugar-Free) Lozenge 1 Lozenge Oral every 4 hours PRN  chlorhexidine 2% Cloths 1 Application(s) Topical <User Schedule>  nystatin/triamcinolone Cream 1 Application(s) Topical two times a day  triamcinolone 0.1% Ointment 1 Application(s) Topical two times a day      CODE STATUS: full code       IMAGING: < from: CT Abdomen and Pelvis w/ IV Cont (01.15.20 @ 13:25) >  IMPRESSION:     Loculated large left pleural effusion with areas of hyperattenuation likely secondary to hematoma. Interval increase in size of the loculated left pleural effusion since 12/30/2019. Left sided chest tube terminating in the pleural space. Trace left pneumothorax.     Extensive left subcutaneous emphysema. Hematoma is also noted along the left lateral chest wall.    Interval resolution of the midline anterior abdominal wall fluid collection.    Markedly distended urinary bladder with mild bilateral hydronephrosis.    A small droplet of gas in the right anterior abdomen adjacent to the ileostomy may be extraluminal. Repeat CT with oral contrast may be helpful for further evaluation.      < end of copied text >

## 2020-02-08 NOTE — PROGRESS NOTE ADULT - SUBJECTIVE AND OBJECTIVE BOX
GENERAL SURGERY DAILY PROGRESS NOTE:     Subjective:  Did not sleep well, but feels okay. Taking CLD without issue.  Had melena overnight times multiple. Denies lightheadedness, dizziness.             Objective:    MEDICATIONS  (STANDING):  acetaminophen  IVPB .. 1000 milliGRAM(s) IV Intermittent every 6 hours  albuterol/ipratropium for Nebulization. 3 milliLiter(s) Nebulizer every 6 hours  buDESOnide    Inhalation Suspension 0.5 milliGRAM(s) Inhalation every 12 hours  chlorhexidine 2% Cloths 1 Application(s) Topical <User Schedule>  enoxaparin Injectable 40 milliGRAM(s) SubCutaneous daily  ergocalciferol 57780 Unit(s) Oral every week  guaiFENesin  milliGRAM(s) Oral every 12 hours  nystatin/triamcinolone Cream 1 Application(s) Topical two times a day  pantoprazole  Injectable 40 milliGRAM(s) IV Push every 12 hours  Parenteral Nutrition - Adult 1 Each (83 mL/Hr) TPN Continuous <Continuous>  tamsulosin 0.4 milliGRAM(s) Oral at bedtime  triamcinolone 0.1% Ointment 1 Application(s) Topical two times a day    MEDICATIONS  (PRN):  benzocaine 15 mG/menthol 3.6 mG (Sugar-Free) Lozenge 1 Lozenge Oral every 4 hours PRN Sore Throat  HYDROmorphone  Injectable 0.5 milliGRAM(s) IV Push every 3 hours PRN Severe Pain (7 - 10)      Vital Signs Last 24 Hrs  T(C): 37.3 (2020 07:00), Max: 37.3 (2020 07:00)  T(F): 99.1 (2020 07:00), Max: 99.1 (2020 07:00)  HR: 92 (2020 09:00) (87 - 105)  BP: 130/60 (2020 09:00) (112/59 - 167/75)  BP(mean): 86 (2020 09:00) (79 - 118)  RR: 21 (2020 09:00) (20 - 36)  SpO2: 100% (2020 09:00) (91% - 100%)    I&O's Detail    2020 07:01  -  2020 07:00  --------------------------------------------------------  IN:    fat emulsion (Fish Oil and Plant Based) 20% Infusion: 270.3 mL    Solution: 250 mL    TPN (Total Parenteral Nutrition): 1909 mL  Total IN: 2429.3 mL    OUT:    Voided: 1400 mL  Total OUT: 1400 mL    Total NET: 1029.3 mL      2020 07:01  -  2020 10:14  --------------------------------------------------------  IN:    TPN (Total Parenteral Nutrition): 249 mL  Total IN: 249 mL    OUT:    Voided: 150 mL  Total OUT: 150 mL    Total NET: 99 mL        NAD, awake and alert  Affect appropriate  No gross neurologic deficits  No rashes  No jaundice or scleral icterus  Respirations nonlabored  CV regular pulse  Abdomen soft, appropriately tender, nondistended  No guarding or rebound tenderness  Incision with mild oozing, otherwise intact  Extremities warm     LABS:                        6.6    8.60  )-----------( 293      ( 2020 08:36 )             21.0     02-08    136  |  102  |  19  ----------------------------<  112<H>  3.9   |  23  |  0.33<L>    Ca    8.2<L>      2020 00:40  Phos  3.3     02-08  Mg     1.9     02-08    TPro  6.4  /  Alb  2.5<L>  /  TBili  0.8  /  DBili  0.4<H>  /  AST  17  /  ALT  25  /  AlkPhos  95  02-08    PT/INR - ( 2020 08:36 )   PT: 13.0 sec;   INR: 1.13 ratio         PTT - ( 2020 08:36 )  PTT:38.2 sec  Urinalysis Basic - ( 2020 19:08 )    Color: Yellow / Appearance: Clear / S.034 / pH: x  Gluc: x / Ketone: Negative  / Bili: Negative / Urobili: 3 mg/dL   Blood: x / Protein: Trace / Nitrite: Negative   Leuk Esterase: Negative / RBC: 0 /hpf / WBC 0 /HPF   Sq Epi: x / Non Sq Epi: 2 /hpf / Bacteria: Occasional        RADIOLOGY & ADDITIONAL STUDIES:

## 2020-02-08 NOTE — PROGRESS NOTE ADULT - ASSESSMENT
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 Complicated hospital course including spontaneous L PTX (12/15) likely 2nd bleb rupture with CT placement. Eventual L VATS with Pleurex placement (1/10) then re-op L VATS with partial decortication (1/15) and evacuation of hemothorax. Then s/p ex-lap, MIRYAM and ileostomy reversal 2/4. Now with GIB

## 2020-02-08 NOTE — PROGRESS NOTE ADULT - SUBJECTIVE AND OBJECTIVE BOX
Follow-up Pulm Progress Note    No new respiratory events overnight.  Denies SOB/CP.   Cough and pain improving  Now with GIB   98% on 1-2L NC    Medications:  MEDICATIONS  (STANDING):  acetaminophen  IVPB .. 1000 milliGRAM(s) IV Intermittent every 6 hours  albuterol/ipratropium for Nebulization. 3 milliLiter(s) Nebulizer every 6 hours  buDESOnide    Inhalation Suspension 0.5 milliGRAM(s) Inhalation every 12 hours  chlorhexidine 2% Cloths 1 Application(s) Topical <User Schedule>  ergocalciferol 13708 Unit(s) Oral every week  fat emulsion (Fish Oil and Plant Based) 20% Infusion 20.8 mL/Hr (20.8 mL/Hr) IV Continuous <Continuous>  guaiFENesin  milliGRAM(s) Oral every 12 hours  nystatin/triamcinolone Cream 1 Application(s) Topical two times a day  pantoprazole  Injectable 40 milliGRAM(s) IV Push every 12 hours  Parenteral Nutrition - Adult 1 Each (83 mL/Hr) TPN Continuous <Continuous>  Parenteral Nutrition - Adult 1 Each (83 mL/Hr) TPN Continuous <Continuous>  tamsulosin 0.4 milliGRAM(s) Oral at bedtime  triamcinolone 0.1% Ointment 1 Application(s) Topical two times a day    MEDICATIONS  (PRN):  benzocaine 15 mG/menthol 3.6 mG (Sugar-Free) Lozenge 1 Lozenge Oral every 4 hours PRN Sore Throat  HYDROmorphone  Injectable 0.5 milliGRAM(s) IV Push every 3 hours PRN Severe Pain (7 - 10)          Vital Signs Last 24 Hrs  T(C): 37.3 (2020 07:00), Max: 37.3 (2020 07:00)  T(F): 99.1 (2020 07:00), Max: 99.1 (2020 07:00)  HR: 101 (2020 10:00) (87 - 105)  BP: 130/62 (2020 10:00) (112/59 - 167/75)  BP(mean): 89 (2020 10:00) (79 - 118)  RR: 29 (2020 10:00) (20 - 36)  SpO2: 95% (2020 10:00) (91% - 100%) on 2L CN           @ 07:01  -   @ 07:00  --------------------------------------------------------  IN: 2429.3 mL / OUT: 1400 mL / NET: 1029.3 mL          LABS:                        6.6    8.60  )-----------( 293      ( 2020 08:36 )             21.0     02-08    136  |  102  |  19  ----------------------------<  112<H>  3.9   |  23  |  0.33<L>    Ca    8.2<L>      2020 00:40  Phos  3.3     02-08  Mg     1.9     -08    TPro  6.4  /  Alb  2.5<L>  /  TBili  0.8  /  DBili  0.4<H>  /  AST  17  /  ALT  25  /  AlkPhos  95  02-08          CAPILLARY BLOOD GLUCOSE        PT/INR - ( 2020 08:36 )   PT: 13.0 sec;   INR: 1.13 ratio         PTT - ( 2020 08:36 )  PTT:38.2 sec  Urinalysis Basic - ( 2020 19:08 )    Color: Yellow / Appearance: Clear / S.034 / pH: x  Gluc: x / Ketone: Negative  / Bili: Negative / Urobili: 3 mg/dL   Blood: x / Protein: Trace / Nitrite: Negative   Leuk Esterase: Negative / RBC: 0 /hpf / WBC 0 /HPF   Sq Epi: x / Non Sq Epi: 2 /hpf / Bacteria: Occasional                      CULTURES: (if applicable)  Culture Results:   No growth ( @ 09:40)    Most recent blood culture --  @ 09:40   -- -- .Urine Clean Catch (Midstream)  @ 09:40        Physical Examination:  PULM: Diminished throughout   CVS: S1, S2 heard    RADIOLOGY REVIEWED  CXR: L costophrenic angle off film  L basilar atelectasis

## 2020-02-08 NOTE — PROGRESS NOTE ADULT - SUBJECTIVE AND OBJECTIVE BOX
[facility] NUTRITION SUPPORT--  Attending/ PA FOLLOW UP NOTE      24 hour events/subjective: Denies Palpitations, chest pain, shortness of breath. Denies nausea nor vomiting nor abdominal pain.    24 HOUR EVENTS:  - Continues to have melena however Hct remains stable.  - No overnight events      PAST HISTORY  --------------------------------------------------------------------------------  No significant changes to PMH, PSH, FHx, SHx, unless otherwise noted    ALLERGIES & MEDICATIONS  --------------------------------------------------------------------------------  Allergies    IV Contrast (Hives)    Intolerances      Standing Inpatient Medications  acetaminophen  IVPB .. 1000 milliGRAM(s) IV Intermittent every 6 hours  albuterol/ipratropium for Nebulization. 3 milliLiter(s) Nebulizer every 6 hours  buDESOnide    Inhalation Suspension 0.5 milliGRAM(s) Inhalation every 12 hours  chlorhexidine 2% Cloths 1 Application(s) Topical <User Schedule>  enoxaparin Injectable 40 milliGRAM(s) SubCutaneous daily  ergocalciferol 73666 Unit(s) Oral every week  fat emulsion (Fish Oil and Plant Based) 20% Infusion 20.8 mL/Hr IV Continuous <Continuous>  guaiFENesin  milliGRAM(s) Oral every 12 hours  nystatin/triamcinolone Cream 1 Application(s) Topical two times a day  pantoprazole  Injectable 40 milliGRAM(s) IV Push every 12 hours  Parenteral Nutrition - Adult 1 Each TPN Continuous <Continuous>  tamsulosin 0.4 milliGRAM(s) Oral at bedtime  triamcinolone 0.1% Ointment 1 Application(s) Topical two times a day    PRN Inpatient Medications  benzocaine 15 mG/menthol 3.6 mG (Sugar-Free) Lozenge 1 Lozenge Oral every 4 hours PRN  HYDROmorphone  Injectable 0.5 milliGRAM(s) IV Push every 3 hours PRN      REVIEW OF SYSTEMS  --------------------------------------------------------------------------------  Gen: as per HPI  Skin: No rashes  Head/Eyes/Ears/Mouth: No headache;No sore throat  Respiratory: No dyspnea, cough,   CV: No chest pain, PND, orthopnea  GI: as per HPI  : No increased frequency, dysuria, hematuria, nocturia  MSK: No joint pain/swelling; no back pain; no edema  Neuro: No dizziness/lightheadedness, weakness, seizures, numbness, tingling  Psych: No significant nervousness, anxiety, stress, depression    All other systems were reviewed and are negative, except as noted.      LABS/STUDIES  --------------------------------------------------------------------------------              7.2    12.85 >-----------<  300      [02-08-20 @ 00:40]              22.1     136  |  102  |  19  ----------------------------<  112      [02-08-20 @ 00:40]  3.9   |  23  |  0.33        Ca     8.2     [02-08-20 @ 00:40]      iCa    1.15     [02-08 @ 01:07]      Mg     1.9     [02-08-20 @ 00:40]      Phos  3.3     [02-08-20 @ 00:40]    TPro  6.4  /  Alb  2.5  /  TBili  0.8  /  DBili  0.4  /  AST  17  /  ALT  25  /  AlkPhos  95  [02-08-20 @ 00:40]    PT/INR: PT 13.0 , INR 1.13       [02-08-20 @ 08:36]  PTT: 38.2       [02-08-20 @ 08:36]        Glucose, Serum: 112 mg/dL (02-08-20 @ 00:40)    PrealbuminPrealbumin, Serum: 21 mg/dL (02-03-20 @ 23:50)  Prealbumin, Serum: 19 mg/dL (02-03-20 @ 07:18)  Prealbumin, Serum: 18 mg/dL (02-02-20 @ 09:48)  Prealbumin, Serum: 18 mg/dL (01-31-20 @ 07:40)  Prealbumin, Serum: 19 mg/dL (01-27-20 @ 04:52)    Triglycerides      02-07-20 @ 07:01  -  02-08-20 @ 07:00  --------------------------------------------------------  IN: 2429.3 mL / OUT: 1400 mL / NET: 1029.3 mL    02-08-20 @ 07:01  -  02-08-20 @ 09:34  --------------------------------------------------------  IN: 249 mL / OUT: 150 mL / NET: 99 mL        VITALS/PHYSICAL EXAM  --------------------------------------------------------------------------------  T(C): 37.3 (02-08-20 @ 07:00), Max: 37.3 (02-08-20 @ 07:00)  HR: 92 (02-08-20 @ 09:00) (87 - 105)  BP: 130/60 (02-08-20 @ 09:00) (112/59 - 167/75)  RR: 21 (02-08-20 @ 09:00) (20 - 36)  SpO2: 100% (02-08-20 @ 09:00) (91% - 100%)  Wt(kg): --    Physical Exam:  	Gen: , A&Ox3, NC O2,  	HEENT: NC/AT, PERRL,              Neck, trachea midline, no noted JVD             Chest" non labored breating             Abd: softly distended, appropriately tender. midline dressing CDI               MSK/ Vascular: Equally Warm,  no edema,  no clubbing, cyanosis,                      RUE PICC w/o sx infection   	Neuro: No focal deficits, intact sensation, weakened strength BLE>BUE  	Psych: Normal affect and mood              Skin: Warm improving, erythematous pinpoint rash chest/ back, groin      .  .  .  . [facility] NUTRITION SUPPORT--  Attending/ PA FOLLOW UP NOTE      24 hour events/subjective: Tolerating TPN at goal  without issues: Denies palpitations, chest pain, shortness of breath. Denies nausea nor vomiting nor abdominal pain. Reports diarrhea anf d flatus, reports tolerating clears.  GI called for episodes melena. Pt to be transfused later today for drop in H/H. Denies F/C/NS.      PAST HISTORY  --------------------------------------------------------------------------------  No significant changes to PMH, PSH, FHx, SHx, unless otherwise noted    ALLERGIES & MEDICATIONS  --------------------------------------------------------------------------------  Allergies    IV Contrast (Hives)    Intolerances      Standing Inpatient Medications  acetaminophen  IVPB .. 1000 milliGRAM(s) IV Intermittent every 6 hours  albuterol/ipratropium for Nebulization. 3 milliLiter(s) Nebulizer every 6 hours  buDESOnide    Inhalation Suspension 0.5 milliGRAM(s) Inhalation every 12 hours  chlorhexidine 2% Cloths 1 Application(s) Topical <User Schedule>  enoxaparin Injectable 40 milliGRAM(s) SubCutaneous daily  ergocalciferol 51711 Unit(s) Oral every week  fat emulsion (Fish Oil and Plant Based) 20% Infusion 20.8 mL/Hr IV Continuous <Continuous>  guaiFENesin  milliGRAM(s) Oral every 12 hours  nystatin/triamcinolone Cream 1 Application(s) Topical two times a day  pantoprazole  Injectable 40 milliGRAM(s) IV Push every 12 hours  Parenteral Nutrition - Adult 1 Each TPN Continuous <Continuous>  tamsulosin 0.4 milliGRAM(s) Oral at bedtime  triamcinolone 0.1% Ointment 1 Application(s) Topical two times a day    PRN Inpatient Medications  benzocaine 15 mG/menthol 3.6 mG (Sugar-Free) Lozenge 1 Lozenge Oral every 4 hours PRN  HYDROmorphone  Injectable 0.5 milliGRAM(s) IV Push every 3 hours PRN      REVIEW OF SYSTEMS  --------------------------------------------------------------------------------  Gen: as per HPI  Skin: No rashes  Head/Eyes/Ears/Mouth: No headache;No sore throat  Respiratory: No dyspnea, cough,   CV: No chest pain, PND, orthopnea  GI: as per HPI  : No increased frequency, dysuria, hematuria, nocturia  MSK: No joint pain/swelling; no back pain; no edema  Neuro: No dizziness/lightheadedness, weakness, seizures, numbness, tingling  Psych: No significant nervousness, anxiety, stress, depression    All other systems were reviewed and are negative, except as noted.      LABS/STUDIES  --------------------------------------------------------------------------------              7.2    12.85 >-----------<  300      [02-08-20 @ 00:40]              22.1     136  |  102  |  19  ----------------------------<  112      [02-08-20 @ 00:40]  3.9   |  23  |  0.33        Ca     8.2     [02-08-20 @ 00:40]      iCa    1.15     [02-08 @ 01:07]      Mg     1.9     [02-08-20 @ 00:40]      Phos  3.3     [02-08-20 @ 00:40]    TPro  6.4  /  Alb  2.5  /  TBili  0.8  /  DBili  0.4  /  AST  17  /  ALT  25  /  AlkPhos  95  [02-08-20 @ 00:40]    PT/INR: PT 13.0 , INR 1.13       [02-08-20 @ 08:36]  PTT: 38.2       [02-08-20 @ 08:36]        Glucose, Serum: 112 mg/dL (02-08-20 @ 00:40)    PrealbuminPrealbumin, Serum: 21 mg/dL (02-03-20 @ 23:50)  Prealbumin, Serum: 19 mg/dL (02-03-20 @ 07:18)  Prealbumin, Serum: 18 mg/dL (02-02-20 @ 09:48)  Prealbumin, Serum: 18 mg/dL (01-31-20 @ 07:40)  Prealbumin, Serum: 19 mg/dL (01-27-20 @ 04:52)    Triglycerides      02-07-20 @ 07:01  -  02-08-20 @ 07:00  --------------------------------------------------------  IN: 2429.3 mL / OUT: 1400 mL / NET: 1029.3 mL    02-08-20 @ 07:01  -  02-08-20 @ 09:34  --------------------------------------------------------  IN: 249 mL / OUT: 150 mL / NET: 99 mL        VITALS/PHYSICAL EXAM  --------------------------------------------------------------------------------  T(C): 37.3 (02-08-20 @ 07:00), Max: 37.3 (02-08-20 @ 07:00)  HR: 92 (02-08-20 @ 09:00) (87 - 105)  BP: 130/60 (02-08-20 @ 09:00) (112/59 - 167/75)  RR: 21 (02-08-20 @ 09:00) (20 - 36)  SpO2: 100% (02-08-20 @ 09:00) (91% - 100%)  Wt(kg): --    Physical Exam:  	Gen: , A&Ox3, NC O2,  	HEENT: NC/AT, PERRL,              Neck, trachea midline, no noted JVD             Chest" non labored breating             Abd: softly distended, appropriately tender. midline dressing CDI               MSK/ Vascular: Equally Warm,  no edema,  no clubbing, cyanosis,                      RUE PICC w/o sx infection   	Neuro: No focal deficits, intact sensation, weakened strength BLE>BUE  	Psych: Normal affect and mood              Skin: Warm improving, erythematous pinpoint rash chest/ back, groin      .  .  .  .

## 2020-02-08 NOTE — CONSULT NOTE ADULT - PROVIDER SPECIALTY LIST ADULT
Dermatology
Gastroenterology
Infectious Disease
SICU
Dermatology
Gastroenterology
Nutrition Support
Thoracic Surgery
Cardiology
Pulmonology

## 2020-02-08 NOTE — PROGRESS NOTE ADULT - ASSESSMENT
75 y/o male presenting with high grade SBO s/p exploratory laparotomy, lysis of adhesions, decompression of bowel via enterotomy w/ primary repair, & Abthera VAC placement (12/06/2019); RTOR for re-exploration w/ Abthera VAC replacement (12/08/2019) to allow for demarcation of ischemic small bowel; RTOR for re-exploration, small bowel resection of 150 cm (150 cm remaining), ileocecetomy, end ileostomy, mucous fistula, and abdominal closure (12/10/2019); hospital course complicated by SVT, short bowel syndrome requiring repletions w/ IV fluids, malnutrition requiring TPN, intermittent episodes of hypotension, spontaneous left pneumothorax s/p pigtail catheter (12/15/2019-12/24/2019), left pleural effusion s/p pigtail catheter w/ IR (12/30/2019) & subsequent placement of Pleur-X catheter (1/10/2020) c/b left hemothorax s/p left VATS, dysphagia, and oral HSV lesions; now s/p ileostomy reversal    PLAN:    Neuro: acute post-op pain  - Monitor mental status  - Pain control as needed with IV acetaminophen and Dilaudid    Resp: COPD, spontaneous left pneumothorax s/p pigtail catheter, left pleural effusion s/p Pleur-X catheter c/b hemothorax s/p left VATS  - Monitor pulse oximeter  - Pulmicort and Duoneb for COPD  - To follow w/ pulmonologist Dr. Curry after discharge    CV: SVT  - Monitor vital signs    GI: s/p exploratory laparotomy, [place], decompression of bowel via enterotomy w/ primary repair, & Abthera VAC placement (12/06/2019); re-exploration w/ ABthera VAC replacement (12/08/2019); s/p re-exploration, small bowel resection of 150 cm (150 cm remaining), ileocecectomy end ileostomy, mucous fistula, & abdominal closure (12/10/2019); s/p ileostomy reversal (2/4/2020); short bowel syndrome, malnutrition, dysphagia  - NGT discontinued, on sips/chips  - passed gas and had loose BM overnight  - Protonix BID to decrease gastric secretions    Renal: urinary retention, polyuria (resolved)  - Monitor I&Os  - Monitor electrolytes and replete as necessary  - intermittent retention, but voiding independently  - Doxazosin for urinary retention      Heme: anemia likely secondary to malnutrition / malabsorption  - Monitor CBC and coags  - Lovenox for VTE prophylaxis    ID: oral HSV lesions (s/p acyclovir 12/23/2019-01/02/2020), Pseudomonas PNA (s/p meropenem 1/19/2020-1/26/2020)  - Monitor for clinical evidence of active infection    Endo: no acute issues  - Monitor glucose q6hrs while on TPN    Disposition:  - Full code  - Stable for transfer to floors

## 2020-02-08 NOTE — PROGRESS NOTE ADULT - SUBJECTIVE AND OBJECTIVE BOX
Subjective: Patient seen and examined. No new events except as noted.   remains in iCU   melena however Hct remains stable  REVIEW OF SYSTEMS:    CONSTITUTIONAL:+ weakness, fevers or chills  EYES/ENT: No visual changes;  No vertigo or throat pain   NECK: No pain or stiffness  RESPIRATORY: No cough, wheezing, hemoptysis; No shortness of breath  CARDIOVASCULAR: No chest pain or palpitations  GASTROINTESTINAL: No abdominal or epigastric pain. No nausea, vomiting, or hematemesis; No diarrhea or constipation. +melena or hematochezia.  GENITOURINARY: No dysuria, frequency or hematuria  NEUROLOGICAL: No numbness or weakness  SKIN: No itching, burning, rashes, or lesions   All other review of systems is negative unless indicated above.    MEDICATIONS:  MEDICATIONS  (STANDING):  albuterol/ipratropium for Nebulization. 3 milliLiter(s) Nebulizer every 6 hours  buDESOnide    Inhalation Suspension 0.5 milliGRAM(s) Inhalation every 12 hours  chlorhexidine 2% Cloths 1 Application(s) Topical <User Schedule>  ergocalciferol 86767 Unit(s) Oral every week  fat emulsion (Fish Oil and Plant Based) 20% Infusion 20.8 mL/Hr (20.8 mL/Hr) IV Continuous <Continuous>  guaiFENesin  milliGRAM(s) Oral every 12 hours  nystatin/triamcinolone Cream 1 Application(s) Topical two times a day  pantoprazole  Injectable 80 milliGRAM(s) IV Push once  pantoprazole Infusion 8 mG/Hr (10 mL/Hr) IV Continuous <Continuous>  Parenteral Nutrition - Adult 1 Each (83 mL/Hr) TPN Continuous <Continuous>  tamsulosin 0.4 milliGRAM(s) Oral at bedtime  triamcinolone 0.1% Ointment 1 Application(s) Topical two times a day      PHYSICAL EXAM:  T(C): 37.1 (02-08-20 @ 20:00), Max: 37.3 (02-08-20 @ 07:00)  HR: 104 (02-08-20 @ 20:00) (87 - 106)  BP: 153/81 (02-08-20 @ 20:00) (112/59 - 167/79)  RR: 30 (02-08-20 @ 20:00) (20 - 36)  SpO2: 93% (02-08-20 @ 20:00) (91% - 100%)  Wt(kg): --  I&O's Summary    07 Feb 2020 07:01  -  08 Feb 2020 07:00  --------------------------------------------------------  IN: 2429.3 mL / OUT: 1400 mL / NET: 1029.3 mL    08 Feb 2020 07:01  -  08 Feb 2020 20:47  --------------------------------------------------------  IN: 1358.4 mL / OUT: 500 mL / NET: 858.4 mL          Appearance: NAD	  HEENT:   Dry  oral mucosa, PERRL, EOMI	  Lymphatic: No lymphadenopathy +NGT   Cardiovascular: Normal S1 S2, No JVD, No murmurs , Peripheral pulses palpable 2+ bilaterally  Respiratory: decreased bs   Gastrointestinal:  Soft, +tender, + BS	  Skin:+ rashes, No ecchymoses, No cyanosis, warm to touch  Musculoskeletal: Decreased range of motion and  strength, LUE edema from elbow joint to shoulder   Psychiatry:  Mood & affect appropriate  Ext: no edema    LABS:    CARDIAC MARKERS:                                8.1    8.80  )-----------( 280      ( 08 Feb 2020 15:15 )             25.8     02-08    136  |  102  |  19  ----------------------------<  112<H>  3.9   |  23  |  0.33<L>    Ca    8.2<L>      08 Feb 2020 00:40  Phos  3.3     02-08  Mg     1.9     02-08    TPro  6.4  /  Alb  2.5<L>  /  TBili  0.8  /  DBili  0.4<H>  /  AST  17  /  ALT  25  /  AlkPhos  95  02-08    proBNP:   Lipid Profile:   HgA1c:   TSH:             TELEMETRY: 	SR    ECG:  	  RADIOLOGY:    DIAGNOSTIC TESTING:  [ ] Echocardiogram:  [ ]  Catheterization:  [ ] Stress Test:    OTHER:

## 2020-02-08 NOTE — CONSULT NOTE ADULT - REASON FOR ADMISSION
GI bleeding
abd. pain

## 2020-02-08 NOTE — PROGRESS NOTE ADULT - ASSESSMENT
75 year old male w/ PMH of COPD and EtOH dependence with PSH/o appy, prostate surgery, & spine surgery  septic shock and high grade SBO s/p exploratory laparotomy, lysis of adhesions, decompression of bowel via enterotomy w/ primary repair, and Abthera VAC placement on 12/6; s/p take down of Abthera, washout and re-application of the Abthera vac on 12/8. Now s/p SBR and end ileostomy on 12/10.   TPN consulted to assist w/ management of pt's nutrition in pt w/ prolonged hospital course now tolerating diet but has high Ileostomy output.  Pt s/p Lt Chest Pigtail for effusion in IR with persistent high output on 1/10/2020 pt had VATs & placement of Left PleurX catheter. Pt op pt developed hemothorax and Respiratory Distress.  Pt is s/p Lt chest Evacuation of 2L blood w/ placement of CT x2 on 1/15/2020 for Lt Pleural Effusion that's resolving and doing well after CT removed and resolved Rt PNA vs Pleural Effusion.   Pt s/p Ex Lap, MIRYAM, & Closure of End Ileostomy on 2/4/2020. Pt w/ improving severe Protein-Calorie Malnutrition, Short Gut Syndrome w/Malabsorption.    -HypoCa- improving w/ increased Ca in TPN to 14mEq- continue to monitor        Improved Ca levels helpful for BP & muscle contraction  -HypoK - improving, will continue w/ 80mEq KCl in TPN        maintaining K & Mg will help with GI motility   -HypoMg- will increased Mg to 16mEq  -HypoPhos- improving w/-increased NaPhos & will continue to monitor   -Fecal fat testing suggestive of decreased absorption of fat -  -Strict Intake and Output.-continue to monitor. h/o  Edema and Fluid overload.   - Urinary retention after removing rosas- follow up as per SICU- urine cx sent  - Stopped octreotide, lomotil, tincture of opium, & imodium - will continue to defer w/ return of GIF  -Good glycemic control-  Fingersticks & ISS coverage - as per SICU  -Weights three times a week  -Daily CMP, Mg, Ionized Ca, Phosphorus,        and Weekly Triglycerides and Pre-albumin  Continue as per SICU/Surgery, will follow with you, D/w primary team    TPN team, pager [phone]  D/w Ana M Chacko & MU Siegel 75 year old male w/ PMH of COPD and EtOH dependence with PSH/o appy, prostate surgery, & spine surgery  septic shock and high grade SBO s/p exploratory laparotomy, lysis of adhesions, decompression of bowel via enterotomy w/ primary repair, and Abthera VAC placement on 12/6; s/p take down of Abthera, washout and re-application of the Abthera vac on 12/8. Now s/p SBR and end ileostomy on 12/10.   TPN consulted to assist w/ management of pt's nutrition in pt w/ prolonged hospital course now tolerating diet but has high Ileostomy output.  Pt s/p Lt Chest Pigtail for effusion in IR with persistent high output on 1/10/2020 pt had VATs & placement of Left PleurX catheter. Pt op pt developed hemothorax and Respiratory Distress.  Pt is s/p Lt chest Evacuation of 2L blood w/ placement of CT x2 on 1/15/2020 for Lt Pleural Effusion that's resolving and doing well after CT removed and resolved Rt PNA vs Pleural Effusion.   Pt s/p Ex Lap, MIRYAM, & Closure of End Ileostomy on 2/4/2020. Pt w/ improving severe Protein-Calorie Malnutrition, Short Gut Syndrome w/Malabsorption.    - cont TPN at goal, pt tolerating clears.  - f/u GI for melena.  -HypoCa- improving w/ increased Ca in TPN to 14mEq- continue to monitor        Improved Ca levels helpful for BP & muscle contraction  -HypoK - improving, will continue w/ 80mEq KCl in TPN        maintaining K & Mg will help with GI motility   -HypoMg- will increased Mg to 16mEq  -HypoPhos- improving w/-increased NaPhos & will continue to monitor   -Fecal fat testing suggestive of decreased absorption of fat -  -Strict Intake and Output.-continue to monitor. h/o  Edema and Fluid overload.   - Urinary retention after removing rosas- follow up as per SICU- urine cx sent  - Stopped octreotide, lomotil, tincture of opium, & imodium - will continue to defer w/ return of GIF  -Good glycemic control-  Fingersticks & ISS coverage - as per SICU  -Weights three times a week  -Daily CMP, Mg, Ionized Ca, Phosphorus,        and Weekly Triglycerides and Pre-albumin  - Continue as per SICU/Surgery, will follow with you, D/w primary team    TPN team, pager [phone]  D/w Ana M Chacko & MU Siegel

## 2020-02-08 NOTE — CONSULT NOTE ADULT - ASSESSMENT
Impression:  # GI Bleed: Hgb slight downtrend and requiring transfusion. Vitals stable, patient somewhat hypertensive and BUN/Cr are stable which is reassuring for likely slow bleeding. Unclear if bleeding from upper GI source Ddx: PUD, erosive gastritis, esophagitis, varices, dieulafoy lesion, angioectasia, stomach cancer vs bleed from surgical sites. No recent EGD/colonoscopy  # SBO high grade s/p multiple surgeries  # Shock: resolved    Recommendation:  - start pantoprazole with bolus 80mg IV followed by infusion at 8mg/hr  - trend CBC, CMP, INR  - 2 large bore IVs; active type and screen  - transfuse Hgb > 7, Platelets > 50  - at this time no indication for need for urgent endoscopy given stable vitals and normal BUN/cr with relatively stable hgb  - continue with supportive care and transfusions  - patient currently on clears making EGD difficult without airway protection  - can place NPO at midnight tonight and pending clinical course will consider bedside EGD in AM  - continue with clear liquid diet for now  - supportive care as per primary team    Salo Florez  [phone]  Please call/page on call fellow on weekends and weekdays after 5pm Impression:  # GI Bleed: Hgb slight downtrend and requiring transfusion. Vitals stable, patient somewhat hypertensive and BUN/Cr are stable which is reassuring for likely slow bleeding. Unclear if bleeding from upper GI source Ddx: PUD, erosive gastritis, esophagitis, varices, dieulafoy lesion, angioectasia, stomach cancer vs bleed from surgical sites. No recent EGD/colonoscopy  # SBO high grade s/p multiple surgeries  # Shock: resolved    Recommendation:  - start pantoprazole with bolus 80mg IV followed by infusion at 8mg/hr  - trend CBC, CMP, INR  - 2 large bore IVs; active type and screen  - transfuse Hgb > 7, Platelets > 50  - at this time no indication for need for urgent endoscopy given stable vitals and normal BUN/cr with relatively stable hgb  - continue with supportive care and transfusions  - patient currently on clears making EGD difficult without airway protection  - can place NPO at midnight tonight and pending clinical course will consider bedside EGD in AM; earlier if clinical status changes and emergent endoscopy is warranted  - continue with clear liquid diet for now  - supportive care as per primary team    Salo Florez  [phone]  [id number]  Please call/page on call fellow on weekends and weekdays after 5pm

## 2020-02-08 NOTE — CONSULT NOTE ADULT - SUBJECTIVE AND OBJECTIVE BOX
Chief Complaint:  Patient is a 75y old  Male who presents with a chief complaint of abd. pain (2020 11:13)      HPI: Pt is a 74 y/o M presenting in 2019 with septic shock and high grade SBO s/p exploratory laparotomy, lysis of adhesions, decompression of bowel via enterotomy w/ primary repair, and Abthera VAC placement on ; s/p take down of Abthera, washout and re-application of the Abthera vac on . 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 Patient is s/p L chest tube placement by IR  for pleural effusion now s/p VATS, with chest tube insertion for drainage of pleural effusion. RRT called  AM for hypoxia, patient transferred back to SICU.  Patient continued SOB, chest drain possible obstruction expanding. Patent taken back to OR emergently 1/15 for clot evac and VATS. Patient respiratory status improved. Patient is now s/p Ileostomy reversal 20. GI consulted for GI bleeding. Pt reportedly having ongoing melena, maroon stools for 2-3 days. Patient otherwise tolerating po. Denies signifiant worsening abdominal pain. Denies n/v. No fever or chills.      Allergies:  IV Contrast (Hives)      Home Medications:    Hospital Medications:  acetaminophen  IVPB .. 1000 milliGRAM(s) IV Intermittent every 6 hours  albuterol/ipratropium for Nebulization. 3 milliLiter(s) Nebulizer every 6 hours  benzocaine 15 mG/menthol 3.6 mG (Sugar-Free) Lozenge 1 Lozenge Oral every 4 hours PRN  buDESOnide    Inhalation Suspension 0.5 milliGRAM(s) Inhalation every 12 hours  chlorhexidine 2% Cloths 1 Application(s) Topical <User Schedule>  ergocalciferol 57551 Unit(s) Oral every week  fat emulsion (Fish Oil and Plant Based) 20% Infusion 20.8 mL/Hr IV Continuous <Continuous>  guaiFENesin  milliGRAM(s) Oral every 12 hours  HYDROmorphone  Injectable 0.5 milliGRAM(s) IV Push every 3 hours PRN  nystatin/triamcinolone Cream 1 Application(s) Topical two times a day  pantoprazole  Injectable 40 milliGRAM(s) IV Push every 12 hours  Parenteral Nutrition - Adult 1 Each TPN Continuous <Continuous>  Parenteral Nutrition - Adult 1 Each TPN Continuous <Continuous>  tamsulosin 0.4 milliGRAM(s) Oral at bedtime  triamcinolone 0.1% Ointment 1 Application(s) Topical two times a day      PMHX/PSHX:  COPD with hypoxia  ETOHism  ETOH abuse  History of lumbosacral spine surgery  History of prostate surgery  History of appendectomy  S/P appendectomy      Family history:      Social History:     ROS:     General:  No wt loss, fevers, chills, night sweats, fatigue,   Eyes:  Good vision, no reported pain  ENT:  No sore throat, pain, runny nose, dysphagia  CV:  No pain, palpitations, hypo/hypertension  Resp:  No dyspnea, cough, tachypnea, wheezing  GI:  See HPI  :  No pain, bleeding, incontinence, nocturia  Muscle:  No pain, weakness  Neuro:  No weakness, tingling, memory problems  Psych:  No fatigue, insomnia, mood problems, depression  Endocrine:  No polyuria, polydipsia, cold/heat intolerance  Heme:  No petechiae, ecchymosis, easy bruisability  Skin:  No rash, edema      PHYSICAL EXAM:     Vital Signs:  Vital Signs Last 24 Hrs  T(C): 37.3 (2020 11:00), Max: 37.3 (2020 07:00)  T(F): 99.1 (2020 11:00), Max: 99.1 (2020 07:00)  HR: 94 (2020 13:00) (87 - 105)  BP: 167/79 (2020 13:00) (112/59 - 167/79)  BP(mean): 114 (2020 13:00) (79 - 118)  RR: 29 (2020 13:00) (20 - 36)  SpO2: 97% (2020 13:00) (91% - 100%)  Daily     Daily Weight in k.8 (2020 02:00)    GENERAL:  frail, NAD  HEENT:  NC/AT,  conjunctivae clear and pink  CHEST:  Full & symmetric excursion, no increased effort  HEART:  Regular rhythm, no JVD  ABDOMEN:  Soft, non-tender, non-distended, surgical scars noted, normoactive bowel sounds  RECTAL: Maroon/dark stool  EXTREMITIES:  no cyanosis, clubbing or edema  SKIN:  No rash/erythema/ecchymoses/petechiae/wounds/abscess/warm/dry  NEURO:  Alert, oriented, nonfocal    LABS:                        6.6    8.60  )-----------( 293      ( 2020 08:36 )             21.0     02-08    136  |  102  |  19  ----------------------------<  112<H>  3.9   |  23  |  0.33<L>    Ca    8.2<L>      2020 00:40  Phos  3.3     02-08  Mg     1.9     02-08    TPro  6.4  /  Alb  2.5<L>  /  TBili  0.8  /  DBili  0.4<H>  /  AST  17  /  ALT  25  /  AlkPhos  95  02-08    LIVER FUNCTIONS - ( 2020 00:40 )  Alb: 2.5 g/dL / Pro: 6.4 g/dL / ALK PHOS: 95 U/L / ALT: 25 U/L / AST: 17 U/L / GGT: x           PT/INR - ( 2020 08:36 )   PT: 13.0 sec;   INR: 1.13 ratio         PTT - ( 2020 08:36 )  PTT:38.2 sec  Urinalysis Basic - ( 2020 19:08 )    Color: Yellow / Appearance: Clear / S.034 / pH: x  Gluc: x / Ketone: Negative  / Bili: Negative / Urobili: 3 mg/dL   Blood: x / Protein: Trace / Nitrite: Negative   Leuk Esterase: Negative / RBC: 0 /hpf / WBC 0 /HPF   Sq Epi: x / Non Sq Epi: 2 /hpf / Bacteria: Occasional          Imaging:

## 2020-02-09 LAB
ANION GAP SERPL CALC-SCNC: 9 MMOL/L — SIGNIFICANT CHANGE UP (ref 5–17)
BUN SERPL-MCNC: 15 MG/DL — SIGNIFICANT CHANGE UP (ref 7–23)
CALCIUM SERPL-MCNC: 8.2 MG/DL — LOW (ref 8.4–10.5)
CHLORIDE SERPL-SCNC: 102 MMOL/L — SIGNIFICANT CHANGE UP (ref 96–108)
CO2 SERPL-SCNC: 23 MMOL/L — SIGNIFICANT CHANGE UP (ref 22–31)
CREAT SERPL-MCNC: 0.34 MG/DL — LOW (ref 0.5–1.3)
GLUCOSE SERPL-MCNC: 107 MG/DL — HIGH (ref 70–99)
HCT VFR BLD CALC: 24.1 % — LOW (ref 39–50)
HCT VFR BLD CALC: 24.4 % — LOW (ref 39–50)
HCT VFR BLD CALC: 25.3 % — LOW (ref 39–50)
HGB BLD-MCNC: 7.6 G/DL — LOW (ref 13–17)
HGB BLD-MCNC: 7.9 G/DL — LOW (ref 13–17)
HGB BLD-MCNC: 8 G/DL — LOW (ref 13–17)
MAGNESIUM SERPL-MCNC: 1.7 MG/DL — SIGNIFICANT CHANGE UP (ref 1.6–2.6)
MCHC RBC-ENTMCNC: 29.3 PG — SIGNIFICANT CHANGE UP (ref 27–34)
MCHC RBC-ENTMCNC: 29.6 PG — SIGNIFICANT CHANGE UP (ref 27–34)
MCHC RBC-ENTMCNC: 30 PG — SIGNIFICANT CHANGE UP (ref 27–34)
MCHC RBC-ENTMCNC: 31.5 GM/DL — LOW (ref 32–36)
MCHC RBC-ENTMCNC: 31.6 GM/DL — LOW (ref 32–36)
MCHC RBC-ENTMCNC: 32.4 GM/DL — SIGNIFICANT CHANGE UP (ref 32–36)
MCV RBC AUTO: 92.7 FL — SIGNIFICANT CHANGE UP (ref 80–100)
MCV RBC AUTO: 92.8 FL — SIGNIFICANT CHANGE UP (ref 80–100)
MCV RBC AUTO: 93.8 FL — SIGNIFICANT CHANGE UP (ref 80–100)
NRBC # BLD: 0 /100 WBCS — SIGNIFICANT CHANGE UP (ref 0–0)
PHOSPHATE SERPL-MCNC: 3.5 MG/DL — SIGNIFICANT CHANGE UP (ref 2.5–4.5)
PLATELET # BLD AUTO: 275 K/UL — SIGNIFICANT CHANGE UP (ref 150–400)
PLATELET # BLD AUTO: 279 K/UL — SIGNIFICANT CHANGE UP (ref 150–400)
PLATELET # BLD AUTO: 286 K/UL — SIGNIFICANT CHANGE UP (ref 150–400)
POTASSIUM SERPL-MCNC: 4.1 MMOL/L — SIGNIFICANT CHANGE UP (ref 3.5–5.3)
POTASSIUM SERPL-SCNC: 4.1 MMOL/L — SIGNIFICANT CHANGE UP (ref 3.5–5.3)
RBC # BLD: 2.57 M/UL — LOW (ref 4.2–5.8)
RBC # BLD: 2.63 M/UL — LOW (ref 4.2–5.8)
RBC # BLD: 2.73 M/UL — LOW (ref 4.2–5.8)
RBC # FLD: 14.2 % — SIGNIFICANT CHANGE UP (ref 10.3–14.5)
RBC # FLD: 14.2 % — SIGNIFICANT CHANGE UP (ref 10.3–14.5)
RBC # FLD: 14.3 % — SIGNIFICANT CHANGE UP (ref 10.3–14.5)
SODIUM SERPL-SCNC: 134 MMOL/L — LOW (ref 135–145)
WBC # BLD: 8.99 K/UL — SIGNIFICANT CHANGE UP (ref 3.8–10.5)
WBC # BLD: 9.55 K/UL — SIGNIFICANT CHANGE UP (ref 3.8–10.5)
WBC # BLD: 9.73 K/UL — SIGNIFICANT CHANGE UP (ref 3.8–10.5)
WBC # FLD AUTO: 8.99 K/UL — SIGNIFICANT CHANGE UP (ref 3.8–10.5)
WBC # FLD AUTO: 9.55 K/UL — SIGNIFICANT CHANGE UP (ref 3.8–10.5)
WBC # FLD AUTO: 9.73 K/UL — SIGNIFICANT CHANGE UP (ref 3.8–10.5)

## 2020-02-09 PROCEDURE — 99233 SBSQ HOSP IP/OBS HIGH 50: CPT

## 2020-02-09 PROCEDURE — 99232 SBSQ HOSP IP/OBS MODERATE 35: CPT | Mod: GC

## 2020-02-09 RX ORDER — MAGNESIUM SULFATE 500 MG/ML
2 VIAL (ML) INJECTION ONCE
Refills: 0 | Status: COMPLETED | OUTPATIENT
Start: 2020-02-09 | End: 2020-02-09

## 2020-02-09 RX ORDER — ACETAMINOPHEN 500 MG
975 TABLET ORAL ONCE
Refills: 0 | Status: COMPLETED | OUTPATIENT
Start: 2020-02-09 | End: 2020-02-09

## 2020-02-09 RX ORDER — I.V. FAT EMULSION 20 G/100ML
20.8 EMULSION INTRAVENOUS
Qty: 50 | Refills: 0 | Status: DISCONTINUED | OUTPATIENT
Start: 2020-02-09 | End: 2020-02-10

## 2020-02-09 RX ORDER — ELECTROLYTE SOLUTION,INJ
1 VIAL (ML) INTRAVENOUS
Refills: 0 | Status: DISCONTINUED | OUTPATIENT
Start: 2020-02-09 | End: 2020-02-09

## 2020-02-09 RX ADMIN — Medication 1 APPLICATION(S): at 05:27

## 2020-02-09 RX ADMIN — Medication 1 APPLICATION(S): at 18:48

## 2020-02-09 RX ADMIN — PANTOPRAZOLE SODIUM 10 MG/HR: 20 TABLET, DELAYED RELEASE ORAL at 20:18

## 2020-02-09 RX ADMIN — Medication 600 MILLIGRAM(S): at 05:27

## 2020-02-09 RX ADMIN — HYDROMORPHONE HYDROCHLORIDE 0.5 MILLIGRAM(S): 2 INJECTION INTRAMUSCULAR; INTRAVENOUS; SUBCUTANEOUS at 05:37

## 2020-02-09 RX ADMIN — HYDROMORPHONE HYDROCHLORIDE 0.5 MILLIGRAM(S): 2 INJECTION INTRAMUSCULAR; INTRAVENOUS; SUBCUTANEOUS at 01:27

## 2020-02-09 RX ADMIN — TAMSULOSIN HYDROCHLORIDE 0.4 MILLIGRAM(S): 0.4 CAPSULE ORAL at 21:57

## 2020-02-09 RX ADMIN — Medication 600 MILLIGRAM(S): at 18:47

## 2020-02-09 RX ADMIN — Medication 0.5 MILLIGRAM(S): at 05:05

## 2020-02-09 RX ADMIN — Medication 975 MILLIGRAM(S): at 20:18

## 2020-02-09 RX ADMIN — Medication 3 MILLILITER(S): at 18:29

## 2020-02-09 RX ADMIN — Medication 0.5 MILLIGRAM(S): at 18:30

## 2020-02-09 RX ADMIN — HYDROMORPHONE HYDROCHLORIDE 0.5 MILLIGRAM(S): 2 INJECTION INTRAMUSCULAR; INTRAVENOUS; SUBCUTANEOUS at 06:43

## 2020-02-09 RX ADMIN — Medication 975 MILLIGRAM(S): at 20:48

## 2020-02-09 RX ADMIN — CHLORHEXIDINE GLUCONATE 1 APPLICATION(S): 213 SOLUTION TOPICAL at 05:28

## 2020-02-09 RX ADMIN — ERGOCALCIFEROL 50000 UNIT(S): 1.25 CAPSULE ORAL at 18:47

## 2020-02-09 RX ADMIN — Medication 50 GRAM(S): at 06:56

## 2020-02-09 RX ADMIN — Medication 3 MILLILITER(S): at 11:24

## 2020-02-09 RX ADMIN — HYDROMORPHONE HYDROCHLORIDE 0.5 MILLIGRAM(S): 2 INJECTION INTRAMUSCULAR; INTRAVENOUS; SUBCUTANEOUS at 00:39

## 2020-02-09 NOTE — PROGRESS NOTE ADULT - SUBJECTIVE AND OBJECTIVE BOX
24 HOUR EVENTS:  - Received one unit PRBC in the am with good response.   - Lovenox for VTE prophylaxis held   - Hct stable overnight   - Protonix gtt started as per GI; possible EGD today     [facility] NUTRITION SUPPORT--  Attending/ PA FOLLOW UP NOTE      24 hour events/subjective: Denies Palpitations, chest pain, shortness of breath. Denies nausea nor vomiting nor abdominal pain.        PAST HISTORY  --------------------------------------------------------------------------------  No significant changes to PMH, PSH, FHx, SHx, unless otherwise noted    ALLERGIES & MEDICATIONS  --------------------------------------------------------------------------------  Allergies    IV Contrast (Hives)    Intolerances      Standing Inpatient Medications  albuterol/ipratropium for Nebulization. 3 milliLiter(s) Nebulizer every 6 hours  buDESOnide    Inhalation Suspension 0.5 milliGRAM(s) Inhalation every 12 hours  chlorhexidine 2% Cloths 1 Application(s) Topical <User Schedule>  ergocalciferol 73356 Unit(s) Oral every week  fat emulsion (Fish Oil and Plant Based) 20% Infusion 20.8 mL/Hr IV Continuous <Continuous>  guaiFENesin  milliGRAM(s) Oral every 12 hours  nystatin/triamcinolone Cream 1 Application(s) Topical two times a day  pantoprazole Infusion 8 mG/Hr IV Continuous <Continuous>  Parenteral Nutrition - Adult 1 Each TPN Continuous <Continuous>  tamsulosin 0.4 milliGRAM(s) Oral at bedtime  triamcinolone 0.1% Ointment 1 Application(s) Topical two times a day    PRN Inpatient Medications  benzocaine 15 mG/menthol 3.6 mG (Sugar-Free) Lozenge 1 Lozenge Oral every 4 hours PRN  HYDROmorphone  Injectable 0.5 milliGRAM(s) IV Push every 3 hours PRN      REVIEW OF SYSTEMS  --------------------------------------------------------------------------------  Gen: as per HPI  Skin: No rashes  Head/Eyes/Ears/Mouth: No headache;No sore throat  Respiratory: No dyspnea, cough,   CV: No chest pain, PND, orthopnea  GI: as per HPI  : No increased frequency, dysuria, hematuria, nocturia  MSK: No joint pain/swelling; no back pain; no edema  Neuro: No dizziness/lightheadedness, weakness, seizures, numbness, tingling  Psych: No significant nervousness, anxiety, stress, depression    All other systems were reviewed and are negative, except as noted.      LABS/STUDIES  --------------------------------------------------------------------------------              8.0    9.55  >-----------<  275      [02-09-20 @ 05:42]              25.3     134  |  102  |  15  ----------------------------<  107      [02-09-20 @ 05:42]  4.1   |  23  |  0.34        Ca     8.2     [02-09-20 @ 05:42]      iCa    1.13     [02-08 @ 21:26]      Mg     1.7     [02-09-20 @ 05:42]      Phos  3.5     [02-09-20 @ 05:42]    TPro  6.3  /  Alb  2.6  /  TBili  0.6  /  DBili  0.3  /  AST  26  /  ALT  34  /  AlkPhos  102  [02-08-20 @ 21:29]    PT/INR: PT 13.0 , INR 1.13       [02-08-20 @ 08:36]  PTT: 38.2       [02-08-20 @ 08:36]        Glucose, Serum: 107 mg/dL (02-09-20 @ 05:42)  Glucose, Serum: 124 mg/dL (02-08-20 @ 21:29)    PrealbuminPrealbumin, Serum: 21 mg/dL (02-03-20 @ 23:50)  Prealbumin, Serum: 19 mg/dL (02-03-20 @ 07:18)  Prealbumin, Serum: 18 mg/dL (02-02-20 @ 09:48)  Prealbumin, Serum: 18 mg/dL (01-31-20 @ 07:40)  Prealbumin, Serum: 19 mg/dL (01-27-20 @ 04:52)    Triglycerides      02-08-20 @ 07:01  -  02-09-20 @ 07:00  --------------------------------------------------------  IN: 2852.4 mL / OUT: 1800 mL / NET: 1052.4 mL    02-09-20 @ 07:01  -  02-09-20 @ 08:53  --------------------------------------------------------  IN: 93 mL / OUT: 0 mL / NET: 93 mL        VITALS/PHYSICAL EXAM  --------------------------------------------------------------------------------  T(C): 37.1 (02-09-20 @ 07:00), Max: 37.5 (02-08-20 @ 23:00)  HR: 103 (02-09-20 @ 07:00) (89 - 112)  BP: 108/58 (02-09-20 @ 07:00) (108/58 - 167/79)  RR: 28 (02-09-20 @ 07:00) (18 - 33)  SpO2: 98% (02-09-20 @ 07:00) (91% - 100%)  Wt(kg): --      Physical Exam:  	Gen: , A&Ox3, NC O2,  	HEENT: NC/AT, PERRL,              Neck, trachea midline, no noted JVD             Chest" non labored breathing             Abd: softly distended, appropriately tender. midline dressing CDI               MSK/ Vascular: FAROM UE and LE. Equally WWP,  trace edema LLE > RLE                      RUE PICC w/o sx infection   	Neuro: No focal deficits  	Psych: Normal affect and mood              Skin: Warm improving, erythematous pinpoint rash chest/ back, groin      .  .  . [facility] NUTRITION SUPPORT--  Attending/ PA FOLLOW UP NOTE      24 hour events/subjective: Reports tolerating clears.  He continues on TPN @ goal, denies palpitations, chest pain, shortness of breath. Denies nausea nor vomiting nor abdominal pain. Pt reports BMs have improved since last night, was going every1.5 hours yesterday til 10pm, and then no BMs til early this AM, and none since. He expressed desire to hold off on and GI scope today, and would like to just have Blood work repeated, and monitor his BMs for now.  Team is aware.    24 HOUR EVENTS:  - Received one unit PRBC in the am with good response.   - Lovenox for VTE prophylaxis held   - Hct stable overnight   - Protonix gtt started as per GI;  holding off on EGD for today, given decreased frequency of bloody BMS, and stable serial H/H.      PAST HISTORY  --------------------------------------------------------------------------------  No significant changes to PMH, PSH, FHx, SHx, unless otherwise noted    ALLERGIES & MEDICATIONS  --------------------------------------------------------------------------------  Allergies    IV Contrast (Hives)    Intolerances      Standing Inpatient Medications  albuterol/ipratropium for Nebulization. 3 milliLiter(s) Nebulizer every 6 hours  buDESOnide    Inhalation Suspension 0.5 milliGRAM(s) Inhalation every 12 hours  chlorhexidine 2% Cloths 1 Application(s) Topical <User Schedule>  ergocalciferol 27085 Unit(s) Oral every week  fat emulsion (Fish Oil and Plant Based) 20% Infusion 20.8 mL/Hr IV Continuous <Continuous>  guaiFENesin  milliGRAM(s) Oral every 12 hours  nystatin/triamcinolone Cream 1 Application(s) Topical two times a day  pantoprazole Infusion 8 mG/Hr IV Continuous <Continuous>  Parenteral Nutrition - Adult 1 Each TPN Continuous <Continuous>  tamsulosin 0.4 milliGRAM(s) Oral at bedtime  triamcinolone 0.1% Ointment 1 Application(s) Topical two times a day    PRN Inpatient Medications  benzocaine 15 mG/menthol 3.6 mG (Sugar-Free) Lozenge 1 Lozenge Oral every 4 hours PRN  HYDROmorphone  Injectable 0.5 milliGRAM(s) IV Push every 3 hours PRN      REVIEW OF SYSTEMS  --------------------------------------------------------------------------------  Gen: as per HPI  Skin: No rashes  Head/Eyes/Ears/Mouth: No headache;No sore throat  Respiratory: No dyspnea, cough,   CV: No chest pain, PND, orthopnea  GI: as per HPI  : No increased frequency, dysuria, hematuria, nocturia  MSK: No joint pain/swelling; no back pain; no edema  Neuro: No dizziness/lightheadedness, weakness, seizures, numbness, tingling  Psych: No significant nervousness, anxiety, stress, depression    All other systems were reviewed and are negative, except as noted.      LABS/STUDIES  --------------------------------------------------------------------------------              8.0    9.55  >-----------<  275      [02-09-20 @ 05:42]              25.3     134  |  102  |  15  ----------------------------<  107      [02-09-20 @ 05:42]  4.1   |  23  |  0.34        Ca     8.2     [02-09-20 @ 05:42]      iCa    1.13     [02-08 @ 21:26]      Mg     1.7     [02-09-20 @ 05:42]      Phos  3.5     [02-09-20 @ 05:42]    TPro  6.3  /  Alb  2.6  /  TBili  0.6  /  DBili  0.3  /  AST  26  /  ALT  34  /  AlkPhos  102  [02-08-20 @ 21:29]    PT/INR: PT 13.0 , INR 1.13       [02-08-20 @ 08:36]  PTT: 38.2       [02-08-20 @ 08:36]        Glucose, Serum: 107 mg/dL (02-09-20 @ 05:42)  Glucose, Serum: 124 mg/dL (02-08-20 @ 21:29)    Prealbumin, Serum: 21 mg/dL (02-03-20 @ 23:50)  Prealbumin, Serum: 19 mg/dL (02-03-20 @ 07:18)  Prealbumin, Serum: 18 mg/dL (02-02-20 @ 09:48)  Prealbumin, Serum: 18 mg/dL (01-31-20 @ 07:40)  Prealbumin, Serum: 19 mg/dL (01-27-20 @ 04:52)    Triglycerides      02-08-20 @ 07:01  -  02-09-20 @ 07:00  --------------------------------------------------------  IN: 2852.4 mL / OUT: 1800 mL / NET: 1052.4 mL    02-09-20 @ 07:01  -  02-09-20 @ 08:53  --------------------------------------------------------  IN: 93 mL / OUT: 0 mL / NET: 93 mL        VITALS/PHYSICAL EXAM  --------------------------------------------------------------------------------  T(C): 37.1 (02-09-20 @ 07:00), Max: 37.5 (02-08-20 @ 23:00)  HR: 103 (02-09-20 @ 07:00) (89 - 112)  BP: 108/58 (02-09-20 @ 07:00) (108/58 - 167/79)  RR: 28 (02-09-20 @ 07:00) (18 - 33)  SpO2: 98% (02-09-20 @ 07:00) (91% - 100%)  Wt(kg): --      Physical Exam:    	Gen: , A&Ox3, NC O2, sitting up  	HEENT: NC/AT, PERRL,              Neck, trachea midline, no noted JVD             Chest: non labored breathing             Abd: softly distended, appropriately tender. midline dressing with Abd pads CDI, some strikethrough at RLQ of dressing serosang, where original ostomy site was.              MSK/ Vascular: FAROM UE and LE. Equally WWP,  trace edema LLE > RLE                      RUE PICC w/o sx infection   	Neuro: No focal deficits  	Psych: Normal affect and mood              Skin: Warm improving, erythematous pinpoint rash chest/ back, groin      .  .  .

## 2020-02-09 NOTE — PROGRESS NOTE ADULT - ATTENDING COMMENTS
GI consulted given evidence of GIB.   Stool maroon in appearance.   Given HD stability, normal BUN, and recent surgery, higher suspicion for LGIB rather than UGIB. Concern for possible bleeding at recent anastomotic site.   Discussed with patient - he would like to defer endoscopic evaluation for now given relatively stable Hgb today. States that if ongoing bleeding, he would likely be amenable.   Given concern for possible bleeding at anastomosis, however, will need to clarify how deep into ileum this site is as may not be in reach with conventional colonoscopy.

## 2020-02-09 NOTE — PROGRESS NOTE ADULT - SUBJECTIVE AND OBJECTIVE BOX
Subjective: Patient seen and examined. No new events except as noted.   remains in ICU  24 HOUR EVENTS:  - Received one unit PRBC in the am with good response.   - Lovenox for VTE prophylaxis held   - Hct stable overnight   - Protonix gtt started as per GI; possible EGD today   REVIEW OF SYSTEMS:    CONSTITUTIONAL:+ weakness, fevers or chills  EYES/ENT: No visual changes;  No vertigo or throat pain   NECK: No pain or stiffness  RESPIRATORY: No cough, wheezing, hemoptysis; No shortness of breath  CARDIOVASCULAR: No chest pain or palpitations  GASTROINTESTINAL: No abdominal or epigastric pain. No nausea, vomiting, or hematemesis; No diarrhea or constipation.+melena or hematochezia.  GENITOURINARY: No dysuria, frequency or hematuria  NEUROLOGICAL: No numbness or weakness  SKIN: No itching, burning, rashes, or lesions   All other review of systems is negative unless indicated above.    MEDICATIONS:  MEDICATIONS  (STANDING):  albuterol/ipratropium for Nebulization. 3 milliLiter(s) Nebulizer every 6 hours  buDESOnide    Inhalation Suspension 0.5 milliGRAM(s) Inhalation every 12 hours  chlorhexidine 2% Cloths 1 Application(s) Topical <User Schedule>  ergocalciferol 95047 Unit(s) Oral every week  guaiFENesin  milliGRAM(s) Oral every 12 hours  nystatin/triamcinolone Cream 1 Application(s) Topical two times a day  pantoprazole Infusion 8 mG/Hr (10 mL/Hr) IV Continuous <Continuous>  Parenteral Nutrition - Adult 1 Each (83 mL/Hr) TPN Continuous <Continuous>  tamsulosin 0.4 milliGRAM(s) Oral at bedtime  triamcinolone 0.1% Ointment 1 Application(s) Topical two times a day      PHYSICAL EXAM:  T(C): 37.1 (02-09-20 @ 07:00), Max: 37.5 (02-08-20 @ 23:00)  HR: 96 (02-09-20 @ 10:00) (87 - 112)  BP: 170/77 (02-09-20 @ 10:00) (105/57 - 170/77)  RR: 30 (02-09-20 @ 10:00) (18 - 33)  SpO2: 98% (02-09-20 @ 10:00) (91% - 99%)  Wt(kg): --  I&O's Summary    08 Feb 2020 07:01  -  09 Feb 2020 07:00  --------------------------------------------------------  IN: 2852.4 mL / OUT: 1800 mL / NET: 1052.4 mL    09 Feb 2020 07:01  -  09 Feb 2020 11:08  --------------------------------------------------------  IN: 279 mL / OUT: 200 mL / NET: 79 mL            Appearance: NAD	  HEENT:   Dry  oral mucosa, PERRL, EOMI	  Lymphatic: No lymphadenopathy  Cardiovascular: Normal S1 S2, No JVD, No murmurs , Peripheral pulses palpable 2+ bilaterally  Respiratory: decreased bs   Gastrointestinal:  Soft, +tender, + BS	  Skin:+ rashes, No ecchymoses, No cyanosis, warm to touch  Musculoskeletal: Decreased range of motion and  strength, LUE edema from elbow joint to shoulder   Psychiatry:  Mood & affect appropriate  Ext: no edema    LABS:    CARDIAC MARKERS:                                7.9    8.99  )-----------( 279      ( 09 Feb 2020 10:24 )             24.4     02-09    134<L>  |  102  |  15  ----------------------------<  107<H>  4.1   |  23  |  0.34<L>    Ca    8.2<L>      09 Feb 2020 05:42  Phos  3.5     02-09  Mg     1.7     02-09    TPro  6.3  /  Alb  2.6<L>  /  TBili  0.6  /  DBili  0.3<H>  /  AST  26  /  ALT  34  /  AlkPhos  102  02-08    proBNP:   Lipid Profile:   HgA1c:   TSH:     0          TELEMETRY: 	  SR  ECG:  	  RADIOLOGY:    DIAGNOSTIC TESTING:  [ ] Echocardiogram:  [ ]  Catheterization:  [ ] Stress Test:    OTHER:

## 2020-02-09 NOTE — PROGRESS NOTE ADULT - ASSESSMENT
76 y/o M presenting with septic shock and high grade SBO s/p exploratory laparotomy, lysis of adhesions, decompression of bowel via enterotomy w/ primary repair, and Abthera VAC placement on 12/6; s/p take down of Abthera, washout and re-application of the Abthera vac on 12/8. 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 s/p L chest tube placement by IR 12/30 for pleural effusion now s/p VATS, with chest tube insertion for drainage of pleural effusion. RRT called 1/13 AM for hypoxia, patient transferred back to SICU.  Patient continued SOB, chest drain possible obstruction expanding. Patent taken back to OR emergently 1/15 for clot evac and VATS. Patient is now s/p Ileostomy reversal 2/4/20.    PLAN:  - F/U H/H  - Monitor BMs  - advance diet as tolerated  - continue TPN, will calorie count once on regular diet  - OOB    [facility]  [id number] 76 y/o M presenting with septic shock and high grade SBO s/p exploratory laparotomy, lysis of adhesions, decompression of bowel via enterotomy w/ primary repair, and Abthera VAC placement on 12/6; s/p take down of Abthera, washout and re-application of the Abthera vac on 12/8. 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 s/p L chest tube placement by IR 12/30 for pleural effusion now s/p VATS, with chest tube insertion for drainage of pleural effusion. RRT called 1/13 AM for hypoxia, patient transferred back to SICU.  Patient continued SOB, chest drain possible obstruction expanding. Patent taken back to OR emergently 1/15 for clot evac and VATS. Patient is now s/p Ileostomy reversal 2/4/20.    PLAN:  - F/U H/H  - Follow GI recommendations regarding possible scope  - Monitor BMs  - advance diet as tolerated  - continue TPN, will calorie count once on regular diet  - OOB    [facility]  [id number]

## 2020-02-09 NOTE — PROGRESS NOTE ADULT - ASSESSMENT
75 year old male w/ PMH of COPD and EtOH dependence with PSH/o appy, prostate surgery, & spine surgery  septic shock and high grade SBO s/p exploratory laparotomy, lysis of adhesions, decompression of bowel via enterotomy w/ primary repair, and Abthera VAC placement on 12/6; s/p take down of Abthera, washout and re-application of the Abthera vac on 12/8. Now s/p SBR and end ileostomy on 12/10.   TPN consulted to assist w/ management of pt's nutrition in pt w/ prolonged hospital course now tolerating diet but has high Ileostomy output.  Pt s/p Lt Chest Pigtail for effusion in IR with persistent high output on 1/10/2020 pt had VATs & placement of Left PleurX catheter. Pt op pt developed hemothorax and Respiratory Distress.  Pt is s/p Lt chest Evacuation of 2L blood w/ placement of CT x2 on 1/15/2020 for Lt Pleural Effusion that's resolving and doing well after CT removed and resolved Rt PNA vs Pleural Effusion.   Pt s/p Ex Lap, MIRYAM, & Closure of End Ileostomy on 2/4/2020. Pt w/ improving severe Protein-Calorie Malnutrition, Short Gut Syndrome w/Malabsorption.    - cont TPN at goal, pt tolerating clears.  - f/u GI for melena.  - HypoCa- continue to monitor: Ca in TPN to 14mEq   - HypoK -  continue to monitor: 80mEq KCl in TPN        maintaining K & Mg will help with GI motility   - HypoMg- continue to monitor.  Mg at 16mEq  - HypoPhos- continue to monitor NaPhos 60mMol  - Fecal fat testing suggestive of decreased absorption of fat -  - Strict Intake and Output.-continue to monitor. h/o  Edema and Fluid overload.  Weights three times a week  - Urinary retention after removing rosas- follow up as per SICU- urine cx sent  - Stopped octreotide, lomotil, tincture of opium, & imodium - will continue to defer w/ return of GIF  - Good glycemic control-  Fingersticks & ISS coverage - as per SICU  - Daily CMP, Mg, Ionized Ca, Phosphorus, and Weekly Triglycerides and Pre-albumin  - Continue as per SICU/Surgery, will follow with you.    TPN team, pager [phone]  D/w Ana M Chacko & MU Siegel 75 year old male w/ PMH of COPD and EtOH dependence with PSH/o appy, prostate surgery, & spine surgery  septic shock and high grade SBO s/p exploratory laparotomy, lysis of adhesions, decompression of bowel via enterotomy w/ primary repair, and Abthera VAC placement on 12/6; s/p take down of Abthera, washout and re-application of the Abthera vac on 12/8. Now s/p SBR and end ileostomy on 12/10.   TPN consulted to assist w/ management of pt's nutrition in pt w/ prolonged hospital course now tolerating diet but has high Ileostomy output.  Pt s/p Lt Chest Pigtail for effusion in IR with persistent high output on 1/10/2020 pt had VATs & placement of Left PleurX catheter. Pt op pt developed hemothorax and Respiratory Distress.  Pt is s/p Lt chest Evacuation of 2L blood w/ placement of CT x2 on 1/15/2020 for Lt Pleural Effusion that's resolving and doing well after CT removed and resolved Rt PNA vs Pleural Effusion.   Pt s/p Ex Lap, MIRYAM, & Closure of End Ileostomy on 2/4/2020. Pt w/ improving severe Protein-Calorie Malnutrition, Short Gut Syndrome w/Malabsorption.    - cont TPN at goal, pt tolerating clears.  - f/u GI recs. Pt started PPI, and holding off on any scope today. Plan to repeat serial h/h and monitor BMs for now.  - HypoCa- continue to monitor: Ca in TPN to 14mEq   - HypoK-  continue to monitor: 80mEq KCl in TPN.  - HypoMg- continue to monitor.  Mg at 16mEq  - HypoPhos- continue to monitor NaPhos 60mMol  - Fecal fat testing suggestive of decreased absorption of fat -  - Strict Intake and Output.-continue to monitor. h/o  Edema and Fluid overload.  Weights three times a week  - Urinary retention after removing rosas- follow up as per SICU- urine cx sent  - Good glycemic control-  Fingersticks & ISS coverage - as per SICU  - Daily CMP, Mg, Ionized Ca, Phosphorus, and Weekly Triglycerides and Pre-albumin  - Continue as per SICU/Surgery, will follow with you.    TPN team, pager [phone]  D/w Ana M Chacko & MU Siegel 75 year old male w/ PMH of COPD and EtOH dependence with PSH/o appy, prostate surgery, & spine surgery  septic shock and high grade SBO s/p exploratory laparotomy, lysis of adhesions, decompression of bowel via enterotomy w/ primary repair, and Abthera VAC placement on 12/6; s/p take down of Abthera, washout and re-application of the Abthera vac on 12/8. Now s/p SBR and end ileostomy on 12/10.   TPN consulted to assist w/ management of pt's nutrition in pt w/ prolonged hospital course now tolerating diet but has high Ileostomy output.  Pt s/p Lt Chest Pigtail for effusion in IR with persistent high output on 1/10/2020 pt had VATs & placement of Left PleurX catheter. Pt op pt developed hemothorax and Respiratory Distress.  Pt is s/p Lt chest Evacuation of 2L blood w/ placement of CT x2 on 1/15/2020 for Lt Pleural Effusion that's resolving and doing well after CT removed and resolved Rt PNA vs Pleural Effusion.   Pt s/p Ex Lap, MIRYAM, & Closure of End Ileostomy on 2/4/2020. Pt w/ improving severe Protein-Calorie Malnutrition, Short Gut Syndrome w/Malabsorption.    - cont TPN at goal, pt tolerating clears.  - melena:  f/u GI recs. Pt started PPI, and holding off on any scope today. Plan to repeat serial h/h and monitor BMs for now.  - HypoCa- continue to monitor: Ca in TPN to 14mEq   - HypoK-  continue to monitor: 80mEq KCl in TPN.  - HypoMg- continue to monitor.  Mg at 16mEq  - HypoPhos- continue to monitor NaPhos 60mMol  - Fecal fat testing suggestive of decreased absorption of fat -  - Strict Intake and Output.-continue to monitor. h/o  Edema and Fluid overload.  Weights three times a week  - Urinary retention after removing rosas- follow up as per SICU- urine cx sent  - Good glycemic control-  Fingersticks & ISS coverage - as per SICU  - Daily CMP, Mg, Ionized Ca, Phosphorus, and Weekly Triglycerides and Pre-albumin  - Continue as per SICU/Surgery, will follow with you.    TPN team, pager [phone]  D/w Ana M Chacko & MU Siegel

## 2020-02-09 NOTE — PROGRESS NOTE ADULT - SUBJECTIVE AND OBJECTIVE BOX
Mr. Hernandez is comfortable in bed  He states during the day he was having bowel movements every 1-2 hours, however overnight, he had a stretch of approximately 10 hours before he went again  Passing flatus  Melena per SICU team, patient unsure  Transfused one unit yesterday during the day    ICU Vital Signs Last 24 Hrs  T(C): 37.1 (09 Feb 2020 07:00), Max: 37.5 (08 Feb 2020 23:00)  T(F): 98.8 (09 Feb 2020 07:00), Max: 99.5 (08 Feb 2020 23:00)  HR: 90 (09 Feb 2020 08:00) (89 - 112)  BP: 105/57 (09 Feb 2020 08:00) (105/57 - 167/79)  BP(mean): 75 (09 Feb 2020 08:00) (75 - 120)  ABP: --  ABP(mean): --  RR: 18 (09 Feb 2020 08:00) (18 - 33)  SpO2: 99% (09 Feb 2020 08:00) (91% - 99%)      I&O's Detail    08 Feb 2020 07:01  -  09 Feb 2020 07:00  --------------------------------------------------------  IN:    fat emulsion (Fish Oil and Plant Based) 20% Infusion: 270.4 mL    Oral Fluid: 120 mL    Packed Red Blood Cells: 300 mL    pantoprazole Infusion: 120 mL    Solution: 50 mL    TPN (Total Parenteral Nutrition): 1992 mL  Total IN: 2852.4 mL    OUT:    Voided: 1800 mL  Total OUT: 1800 mL    Total NET: 1052.4 mL      09 Feb 2020 07:01  -  09 Feb 2020 09:36  --------------------------------------------------------  IN:    pantoprazole Infusion: 10 mL    TPN (Total Parenteral Nutrition): 83 mL  Total IN: 93 mL    OUT:  Total OUT: 0 mL    Total NET: 93 mL          On exam: awake, alert and oriented  Supplemental O2 via NC  TPN running  Abd is soft, mild distension, appropriately tender  Incision is intact, some serosanguinous drainage at the inferior aspect of staple line

## 2020-02-09 NOTE — PROGRESS NOTE ADULT - SUBJECTIVE AND OBJECTIVE BOX
Chief Complaint:  Patient is a 75y old  Male who presents with a chief complaint of abd. pain (2020 11:08)      Interval Events: Pt with 2 episode this AM of maroon/dark red stool. Denies abdominal pain, n/v.    Allergies:  IV Contrast (Hives)      Hospital Medications:  albuterol/ipratropium for Nebulization. 3 milliLiter(s) Nebulizer every 6 hours  benzocaine 15 mG/menthol 3.6 mG (Sugar-Free) Lozenge 1 Lozenge Oral every 4 hours PRN  buDESOnide    Inhalation Suspension 0.5 milliGRAM(s) Inhalation every 12 hours  chlorhexidine 2% Cloths 1 Application(s) Topical <User Schedule>  ergocalciferol 60441 Unit(s) Oral every week  fat emulsion (Fish Oil and Plant Based) 20% Infusion 20.8 mL/Hr IV Continuous <Continuous>  guaiFENesin  milliGRAM(s) Oral every 12 hours  HYDROmorphone  Injectable 0.5 milliGRAM(s) IV Push every 3 hours PRN  nystatin/triamcinolone Cream 1 Application(s) Topical two times a day  pantoprazole Infusion 8 mG/Hr IV Continuous <Continuous>  Parenteral Nutrition - Adult 1 Each TPN Continuous <Continuous>  Parenteral Nutrition - Adult 1 Each TPN Continuous <Continuous>  tamsulosin 0.4 milliGRAM(s) Oral at bedtime  triamcinolone 0.1% Ointment 1 Application(s) Topical two times a day      PMHX/PSHX:  COPD with hypoxia  ETOHism  ETOH abuse  History of lumbosacral spine surgery  History of prostate surgery  History of appendectomy  S/P appendectomy      Family history:      ROS:     General:  No wt loss, fevers, chills, night sweats, fatigue,   Eyes:  Good vision, no reported pain  ENT:  No sore throat, pain, runny nose, dysphagia  CV:  No pain, palpitations, hypo/hypertension  Resp:  No dyspnea, cough, tachypnea, wheezing  GI:  See HPI  :  No pain, bleeding, incontinence, nocturia  Muscle:  No pain, weakness  Neuro:  No weakness, tingling, memory problems  Psych:  No fatigue, insomnia, mood problems, depression  Endocrine:  No polyuria, polydipsia, cold/heat intolerance  Heme:  No petechiae, ecchymosis, easy bruisability  Skin:  No rash, edema      PHYSICAL EXAM:     Vital Signs:  Vital Signs Last 24 Hrs  T(C): 37.2 (2020 11:00), Max: 37.5 (2020 23:00)  T(F): 99 (2020 11:00), Max: 99.5 (2020 23:00)  HR: 90 (2020 11:00) (87 - 112)  BP: 140/67 (2020 11:00) (105/57 - 170/77)  BP(mean): 96 (2020 11:) (75 - 120)  RR: 23 (2020 11:00) (18 - 33)  SpO2: 100% (:) (91% - 100%)  Daily     Daily Weight in k.8 (2020 00:00)    GENERAL:  frail, NAD  HEENT:  NC/AT,  conjunctivae clear and pink  CHEST:  Full & symmetric excursion, no increased effort  HEART:  Regular rhythm, no JVD  ABDOMEN:  Soft, non-tender, non-distended, surgical scars noted, normoactive bowel sounds  EXTREMITIES:  no cyanosis, clubbing or edema  SKIN:  No rash/erythema/ecchymoses/petechiae/wounds/abscess/warm/dry  NEURO:  Alert, oriented, nonfocal    LABS:                        7.9    8.99  )-----------( 279      ( 2020 10:24 )             24.4     02-09    134<L>  |  102  |  15  ----------------------------<  107<H>  4.1   |  23  |  0.34<L>    Ca    8.2<L>      2020 05:42  Phos  3.5     02-09  Mg     1.7     02-09    TPro  6.3  /  Alb  2.6<L>  /  TBili  0.6  /  DBili  0.3<H>  /  AST  26  /  ALT  34  /  AlkPhos  102  02-08    LIVER FUNCTIONS - ( 2020 21:29 )  Alb: 2.6 g/dL / Pro: 6.3 g/dL / ALK PHOS: 102 U/L / ALT: 34 U/L / AST: 26 U/L / GGT: x           PT/INR - ( 2020 08:36 )   PT: 13.0 sec;   INR: 1.13 ratio         PTT - ( 2020 08:36 )  PTT:38.2 sec        Imaging:

## 2020-02-09 NOTE — PROGRESS NOTE ADULT - ATTENDING COMMENTS
Patient seen and examined and agree with above.   POD# 5 s/p reversal of ileostomy with side to side ileocolic anastamosis  Continues to have multiple bowel movements some with melena  Patient refused endoscopy by GI today.     H/H stable   Hct 25-->25-->24  Hemodynamically stable   Respiratory insufficiency - on nasal cannula 2L/min with SpO2 97-98%  COPD- continue pulmicort at this time   Hemodynamically appropriate   Gi - diet advanced

## 2020-02-09 NOTE — PROGRESS NOTE ADULT - ASSESSMENT
Impression:  # Maroon Stool: Likely source if bleeding from surgical site. Patient BUN normal, hgb relatively stable, no melena but rather dark red stool. D/w patient at length performing EGD and patient states he feels well and prefers not to have endoscopes unless on emergent basis. Pt also believes bleeding is from surgical site and feels EGD not warranted. Unclear if surgical site can be reached by endoscopy. hemodynamically stable now and hgb relatively stable, slight downtrend today Pt also on clear diet now and tolerating well  # SBO high grade s/p multiple surgeries  # Shock: resolved    Recommendation:  - continue with IV PPI  - CLD for now  - will discuss with advanced GI if endoscopic procedure can reach surgical site  - if acutely decompensates, hemodynamically unstable and dropping counts, will consider EGD to rule out upper GI source, although their is low suspicion given clinical data and recent surgery  - defer to surgery need for repeat surgery to assess surgical site  - trend CBC, CMP, INR  - 2 large bore IVs; active type and screen  - transfuse Hgb > 7, Platelets > 50  - supportive care as per primary team

## 2020-02-09 NOTE — PROGRESS NOTE ADULT - SUBJECTIVE AND OBJECTIVE BOX
HISTORY  75y Male  COPD and EtOH dependence who presented on 12/6/2019 with abdominal pain, nausea, hematemesis, and poor PO intake for ~2-3 days. In the ED, he was found to be hypotensive & tachycardic. Labs revealed an CHI and lactic acidosis. Imaging revealed a high grade SBO in the RLQ on CT scan. Hospital course is as follows:  12/06 - s/p exploratory laparotomy, lysis of adhesions, decompression of bowel via enterotomy w/ primary repair, and Abthera VAC placement as the distal 50% of the bowel appeared dusky but was still viable, admitted to SICU post-operatively as he was on vasopressor support and was left intubated  12/08 - s/p re-exploration, proximal 165 cm of small bowel appeared pink & viable but beyond that had patchy areas of ischemia so decision was made to give the bowel more time to demarcate before resecting in order to preserve as much small bowel as possible so Abthera VAC was replaced  12/09 - s/p re-exploration, small bowel resection of 150 cm (150 cm remaining), ileocecetomy, end ileostomy, mucous fistula, and abdominal closure  12/11 - extubated to BiPAP, noted to be in SVT that was rate controlled w/ metoprolol  12/14 - started on TPN  12/15 - noted to have spontaneous left pneumothorax s/p left pigtail catheter placement, noted to be in SVT again so amiodarone started, high ileostomy output noted so concern for short bowel syndrome  12/16 - amiodarone discontinued, started on beta blocker with metoprolol  12/18 - started Lomotil and ileostomy repletions with IV fluids  12/19 - started Imodium, left pigtail catheter was placed to water seal but pneumothorax noted on follow-up CXR so placed back to suction  12/20 - started tincture of opium, concern for aspiration so changed diet from regular to dysphagia 1 pureed with nectar-thickened liquids  12/22 - left pigtail catheter placed to water seal with no pneumothorax noted on follow-up CXR, CT chest with moderate left pleural effusion not being drained by pigtail catheter  12/23 - 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  12/24 - left pigtail catheter discontinued  12/28 - started Ancef for erythematous midline wound  12/29 - beta blocker discontinued for intermittent episodes of hypotension  12/30 - left pigtail catheter placement by IR with drainage of serous transudative fluid  01/02 - FEES demonstrating penetration with thin liquids so patient kept on dysphagia 1 pureed with nectar-thickened liquids  01/07 - transferred to floors  01/10 - s/p left VATS w/ PleurX catheter placement  01/11 - evaluated by speech & swallow, advanced diet to mechanical soft with thin liquids  01/12 - PleurX catheter placed to water seal with on pneumothorax on follow-up CXR  01/13 - RRT for hypoxemia, placed on BiPAP, PleurX catheter placed back to suction, started vancomycin & Zosyn for possible HCAP  01/14 - noted to have minimal output from PleurX catheter, lung ultrasound with large left pleural effusion concerning for hemothorax, multiple episodes of bradycardia secondary to hypoxemia, remains on BiPAP  01/15 - worsening respiratory distress requiring intubation, hypotensive requiring vasopressor support, CT chest with large left hemothorax w/ trace pneumothorax & extensive subcutaneous emphysema so taken to OR emergently for left VATS, evacuation fo 2 L of clot, and placement of two left chest tubes  01/16 - extubated, weaned off vasopressor support  01/17 - two left chest tubes placed to water seal with no pneumothorax noted on follow-up CXR  01/22 - inferior chest tube discontinued with no pneumothorax noted on follow-up CXR, polyuria noted  01/24 - given DDAVP for polyuria w/ good response  01/25 - superior chest tube discontinued with no pneumothorax noted on follow-up CXR  02/04 - s/p exploratory laparotomy, lysis of adhesions, and ileostomy reversal      24 HOUR EVENTS:  - Received one unit PRBC in the am with good response.   - Lovenox for VTE prophylaxis held   - Hct stable overnight   - Protonix gtt started as per GI; possible EGD today     SUBJECTIVE/ROS:  [ ] A ten-point review of systems was otherwise negative except as noted.  [ ] Due to altered mental status/intubation, subjective information were not able to be obtained from the patient. History was obtained, to the extent possible, from review of the chart and collateral sources of information.      NEURO  Exam: awake, alert, oriented  Meds: HYDROmorphone  Injectable 0.5 milliGRAM(s) IV Push every 3 hours PRN Severe Pain (7 - 10)    [x] Adequacy of sedation and pain control has been assessed and adjusted      RESPIRATORY  RR: 21 (02-09-20 @ 01:00) (20 - 33)  SpO2: 92% (02-09-20 @ 01:00) (91% - 100%)  Exam: unlabored, clear to auscultation bilaterally  Mechanical Ventilation: none  [N/A] Extubation Readiness Assessed  Meds: albuterol/ipratropium for Nebulization. 3 milliLiter(s) Nebulizer every 6 hours  buDESOnide    Inhalation Suspension 0.5 milliGRAM(s) Inhalation every 12 hours  guaiFENesin  milliGRAM(s) Oral every 12 hours        CARDIOVASCULAR  HR: 105 (02-09-20 @ 01:00) (89 - 106)  BP: 145/80 (02-09-20 @ 01:00) (112/59 - 167/79)  BP(mean): 106 (02-09-20 @ 01:00) (79 - 120)  Exam: regular rate and rhythm  Cardiac Rhythm: sinus  Perfusion     [x]Adequate   [ ]Inadequate  Mentation   [x]Normal       [ ]Reduced  Extremities  [x]Warm         [ ]Cool  Volume Status [ ]Hypervolemic [x]Euvolemic [ ]Hypovolemic  Meds: tamsulosin 0.4 milliGRAM(s) Oral at bedtime        GI/NUTRITION  Exam: soft, nontender, nondistended, incision C/D/I  Diet:   Meds: pantoprazole Infusion 8 mG/Hr IV Continuous <Continuous>      GENITOURINARY  I&O's Detail    02-07 @ 07:01  -  02-08 @ 07:00  --------------------------------------------------------  IN:    fat emulsion (Fish Oil and Plant Based) 20% Infusion: 270.3 mL    Solution: 250 mL    TPN (Total Parenteral Nutrition): 1909 mL  Total IN: 2429.3 mL    OUT:    Voided: 1400 mL  Total OUT: 1400 mL    Total NET: 1029.3 mL      02-08 @ 07:01  -  02-09 @ 01:31  --------------------------------------------------------  IN:    fat emulsion (Fish Oil and Plant Based) 20% Infusion: 187.2 mL    Oral Fluid: 120 mL    Packed Red Blood Cells: 300 mL    pantoprazole Infusion: 60 mL    TPN (Total Parenteral Nutrition): 1494 mL  Total IN: 2161.2 mL    OUT:    Voided: 1200 mL  Total OUT: 1200 mL    Total NET: 961.2 mL          02-08    134<L>  |  102  |  16  ----------------------------<  124<H>  4.1   |  23  |  0.32<L>    Ca    8.0<L>      08 Feb 2020 21:29  Phos  3.3     02-08  Mg     1.8     02-08    TPro  6.3  /  Alb  2.6<L>  /  TBili  0.6  /  DBili  0.3<H>  /  AST  26  /  ALT  34  /  AlkPhos  102  02-08    [ ] Martinez catheter, indication: N/A  Meds: ergocalciferol 98762 Unit(s) Oral every week  fat emulsion (Fish Oil and Plant Based) 20% Infusion 20.8 mL/Hr IV Continuous <Continuous>  Parenteral Nutrition - Adult 1 Each TPN Continuous <Continuous>        HEMATOLOGIC  Meds: none  [x] VTE Prophylaxis                        8.2    10.10 )-----------( 283      ( 08 Feb 2020 21:29 )             25.9     PT/INR - ( 08 Feb 2020 08:36 )   PT: 13.0 sec;   INR: 1.13 ratio         PTT - ( 08 Feb 2020 08:36 )  PTT:38.2 sec  Transfusion     [ ] PRBC   [ ] Platelets   [ ] FFP   [ ] Cryoprecipitate      INFECTIOUS DISEASES  WBC Count: 10.10 K/uL (02-08 @ 21:29)  WBC Count: 8.80 K/uL (02-08 @ 15:15)  WBC Count: 7.26 K/uL (02-08 @ 14:10)  WBC Count: 8.60 K/uL (02-08 @ 08:36)    RECENT CULTURES:  Specimen Source: .Urine Clean Catch (Midstream)  Date/Time: 02-07 @ 09:40  Culture Results:   No growth  Gram Stain: --  Organism: --    Meds: none    ENDOCRINE  CAPILLARY BLOOD GLUCOSE        Meds: none      ACCESS DEVICES:  [x] Peripheral IV  [ ] Central Venous Line	[ ] R	[ ] L	[ ] IJ	[ ] Fem	[ ] SC	Placed:   [ ] Arterial Line		[ ] R	[ ] L	[ ] Fem	[ ] Rad	[ ] Ax	Placed:   [ ] PICC:					[ ] Mediport  [ ] Urinary Catheter, Date Placed:   [x] Necessity of urinary, arterial, and venous catheters discussed    OTHER MEDICATIONS:  benzocaine 15 mG/menthol 3.6 mG (Sugar-Free) Lozenge 1 Lozenge Oral every 4 hours PRN  chlorhexidine 2% Cloths 1 Application(s) Topical <User Schedule>  nystatin/triamcinolone Cream 1 Application(s) Topical two times a day  triamcinolone 0.1% Ointment 1 Application(s) Topical two times a day      CODE STATUS: full code       IMAGING: < from: CT Abdomen and Pelvis w/ IV Cont (01.15.20 @ 13:25) >  CHEST:     LUNGS AND LARGE AIRWAYS: There is complete atelectasis of the left lower lobe and partial atelectasis of the left upper lobe. Partial compressive atelectasis of the right lower lobe. Emphysema. Nodular opacities in the right lower lobe, unchanged.  PLEURA: There is a left chest tubes. There is a large loculated left pleural effusion with areas of high attenuation, increased in size. Trace left pneumothorax. There is a small to moderate right pleural effusion.  VESSELS: Atherosclerotic changes of the aorta and coronary arteries. Right subclavian approach PICC tip in the SVC.  HEART: Heart size is normal. No pericardial effusion.  MEDIASTINUM AND MIKI: No lymphadenopathy.  CHEST WALL AND LOWER NECK: Extensive subcutaneous emphysema along the left chest wall. In addition, high attenuation is noted in the left chest wall adjacent to the pleural catheter measuring approximately 3.4 x 2.1 cm (3:146), suspicious for hematoma.    ABDOMEN AND PELVIS:    LIVER: Subcentimeter hypodense focus in the right hepatic lobe, too small to characterize, but unchanged.  BILE DUCTS: Normal caliber.  GALLBLADDER: Within normal limits.  SPLEEN: Within normal limits.  PANCREAS: Within normal limits.  ADRENALS: Within normal limits.  KIDNEYS/URETERS:Mild hydronephrosis bilaterally. Bilateral nonobstructing calculi.    BLADDER: Markedly distended urinary bladder.  REPRODUCTIVE ORGANS: Prostatectomy.    BOWEL: Right lower quadrant ileostomy. No bowel obstruction. Colonic diverticulosis.  PERITONEUM: Trace ascites. Multiple nonspecific peritoneal calcifications, unchanged. A small droplet of gas in the right anterior abdomen adjacent to the ileostomy (3:208 and 5:43) may be extraluminal.  VESSELS: Atherosclerotic changes.  RETROPERITONEUM/LYMPHNODES: No lymphadenopathy.    ABDOMINAL WALL: Diffuse anasarca. Postsurgical changes of the anterior abdominal wall. The previous reported fluid midline abdominal wall collection has resolved.   BONES: Degenerative changes in the spine.    IMPRESSION:     Loculated large left pleural effusion with areas of hyperattenuation likely secondary to hematoma. Interval increase in size of the loculated left pleural effusion since 12/30/2019. Left sided chest tube terminating in the pleural space. Trace left pneumothorax.     Extensive left subcutaneous emphysema. Hematoma is also noted along the left lateral chest wall.    Interval resolution of the midline anterior abdominal wall fluid collection.    Markedly distended urinary bladder with mild bilateral hydronephrosis.    A small droplet of gas in the right anterior abdomen adjacent to the ileostomy may be extraluminal. Repeat CT with oral contrast may be helpful for further evaluation.      < end of copied text >

## 2020-02-09 NOTE — PROGRESS NOTE ADULT - SUBJECTIVE AND OBJECTIVE BOX
Surgery Progress Note    SUBJECTIVE:  - Patient seen and examined at bedside   - Patient states feeling well, other than having multiple BMs  - Denies abdominal pain, N/V, Chest pain, SOB  - Tolerating CLD and voiding spontaneously  --------------------------------------------------------------------------------------------------  OBJECTIVE:   Physical Exam:  General: AAOx3, NAD, lying comfortably in bed  HEENT: NC/AT  Respiratory: nonlabored breathing  Cardiovascular: RRR, normal S1 and S2, no murmurs or gallops  Abdomen: non-distended, soft, appropriately tender, abdominal dressings soaked with SS fluid.  Extremities: WWP, no edema  --------------------------------------------------------------------------------------------------  V/S:  Vital Signs Last 24 Hrs  T(C): 37.1 (09 Feb 2020 07:00), Max: 37.5 (08 Feb 2020 23:00)  T(F): 98.8 (09 Feb 2020 07:00), Max: 99.5 (08 Feb 2020 23:00)  HR: 90 (09 Feb 2020 08:00) (89 - 112)  BP: 105/57 (09 Feb 2020 08:00) (105/57 - 167/79)  BP(mean): 75 (09 Feb 2020 08:00) (75 - 120)  RR: 18 (09 Feb 2020 08:00) (18 - 33)  SpO2: 99% (09 Feb 2020 08:00) (91% - 99%)    --------------------------------------------------------------------------------------------------  I/Os:    08 Feb 2020 07:01  -  09 Feb 2020 07:00  --------------------------------------------------------  IN:    fat emulsion (Fish Oil and Plant Based) 20% Infusion: 270.4 mL    Oral Fluid: 120 mL    Packed Red Blood Cells: 300 mL    pantoprazole Infusion: 120 mL    Solution: 50 mL    TPN (Total Parenteral Nutrition): 1992 mL  Total IN: 2852.4 mL    OUT:    Voided: 1800 mL  Total OUT: 1800 mL    Total NET: 1052.4 mL      09 Feb 2020 07:01  -  09 Feb 2020 09:48  --------------------------------------------------------  IN:    pantoprazole Infusion: 10 mL    TPN (Total Parenteral Nutrition): 83 mL  Total IN: 93 mL    OUT:  Total OUT: 0 mL    Total NET: 93 mL        --------------------------------------------------------------------------------------------------  LABS:                        8.0    9.55  )-----------( 275      ( 09 Feb 2020 05:42 )             25.3     09 Feb 2020 05:42    134    |  102    |  15     ----------------------------<  107    4.1     |  23     |  0.34     Ca    8.2        09 Feb 2020 05:42  Phos  3.5       09 Feb 2020 05:42  Mg     1.7       09 Feb 2020 05:42    TPro  6.3    /  Alb  2.6    /  TBili  0.6    /  DBili  0.3    /  AST  26     /  ALT  34     /  AlkPhos  102    08 Feb 2020 21:29    PT/INR - ( 08 Feb 2020 08:36 )   PT: 13.0 sec;   INR: 1.13 ratio         PTT - ( 08 Feb 2020 08:36 )  PTT:38.2 sec  CAPILLARY BLOOD GLUCOSE            LIVER FUNCTIONS - ( 08 Feb 2020 21:29 )  Alb: 2.6 g/dL / Pro: 6.3 g/dL / ALK PHOS: 102 U/L / ALT: 34 U/L / AST: 26 U/L / GGT: x             Culture - Urine (collected 07 Feb 2020 09:40)  Source: .Urine Clean Catch (Midstream)  Final Report (08 Feb 2020 08:39):    No growth        --------------------------------------------------------------------------------------------------  MEDICATIONS  (STANDING):  albuterol/ipratropium for Nebulization. 3 milliLiter(s) Nebulizer every 6 hours  buDESOnide    Inhalation Suspension 0.5 milliGRAM(s) Inhalation every 12 hours  chlorhexidine 2% Cloths 1 Application(s) Topical <User Schedule>  ergocalciferol 02978 Unit(s) Oral every week  guaiFENesin  milliGRAM(s) Oral every 12 hours  nystatin/triamcinolone Cream 1 Application(s) Topical two times a day  pantoprazole Infusion 8 mG/Hr (10 mL/Hr) IV Continuous <Continuous>  Parenteral Nutrition - Adult 1 Each (83 mL/Hr) TPN Continuous <Continuous>  tamsulosin 0.4 milliGRAM(s) Oral at bedtime  triamcinolone 0.1% Ointment 1 Application(s) Topical two times a day    MEDICATIONS  (PRN):  benzocaine 15 mG/menthol 3.6 mG (Sugar-Free) Lozenge 1 Lozenge Oral every 4 hours PRN Sore Throat  HYDROmorphone  Injectable 0.5 milliGRAM(s) IV Push every 3 hours PRN Severe Pain (7 - 10)    -------------------------------------------------------------------------------------------------- Surgery Progress Note    SUBJECTIVE/24 HOUR EVENTS:  - Received one unit PRBC in the am with good response.   - Lovenox for VTE prophylaxis held   - Hct stable overnight   - Protonix gtt started as per GI; possible EGD today   - Patient seen and examined at bedside   - Patient states feeling well, other than having multiple BMs  - Denies abdominal pain, N/V, Chest pain, SOB  - Tolerating CLD and voiding spontaneously  --------------------------------------------------------------------------------------------------  OBJECTIVE:   Physical Exam:  General: AAOx3, NAD, lying comfortably in bed  HEENT: NC/AT  Respiratory: nonlabored breathing  Cardiovascular: RRR, normal S1 and S2, no murmurs or gallops  Abdomen: non-distended, soft, appropriately tender, abdominal dressings soaked with SS fluid.  Extremities: WWP, no edema  --------------------------------------------------------------------------------------------------  V/S:  Vital Signs Last 24 Hrs  T(C): 37.1 (09 Feb 2020 07:00), Max: 37.5 (08 Feb 2020 23:00)  T(F): 98.8 (09 Feb 2020 07:00), Max: 99.5 (08 Feb 2020 23:00)  HR: 90 (09 Feb 2020 08:00) (89 - 112)  BP: 105/57 (09 Feb 2020 08:00) (105/57 - 167/79)  BP(mean): 75 (09 Feb 2020 08:00) (75 - 120)  RR: 18 (09 Feb 2020 08:00) (18 - 33)  SpO2: 99% (09 Feb 2020 08:00) (91% - 99%)    --------------------------------------------------------------------------------------------------  I/Os:    08 Feb 2020 07:01  -  09 Feb 2020 07:00  --------------------------------------------------------  IN:    fat emulsion (Fish Oil and Plant Based) 20% Infusion: 270.4 mL    Oral Fluid: 120 mL    Packed Red Blood Cells: 300 mL    pantoprazole Infusion: 120 mL    Solution: 50 mL    TPN (Total Parenteral Nutrition): 1992 mL  Total IN: 2852.4 mL    OUT:    Voided: 1800 mL  Total OUT: 1800 mL    Total NET: 1052.4 mL      09 Feb 2020 07:01  -  09 Feb 2020 09:48  --------------------------------------------------------  IN:    pantoprazole Infusion: 10 mL    TPN (Total Parenteral Nutrition): 83 mL  Total IN: 93 mL    OUT:  Total OUT: 0 mL    Total NET: 93 mL        --------------------------------------------------------------------------------------------------  LABS:                        8.0    9.55  )-----------( 275      ( 09 Feb 2020 05:42 )             25.3     09 Feb 2020 05:42    134    |  102    |  15     ----------------------------<  107    4.1     |  23     |  0.34     Ca    8.2        09 Feb 2020 05:42  Phos  3.5       09 Feb 2020 05:42  Mg     1.7       09 Feb 2020 05:42    TPro  6.3    /  Alb  2.6    /  TBili  0.6    /  DBili  0.3    /  AST  26     /  ALT  34     /  AlkPhos  102    08 Feb 2020 21:29    PT/INR - ( 08 Feb 2020 08:36 )   PT: 13.0 sec;   INR: 1.13 ratio         PTT - ( 08 Feb 2020 08:36 )  PTT:38.2 sec  CAPILLARY BLOOD GLUCOSE            LIVER FUNCTIONS - ( 08 Feb 2020 21:29 )  Alb: 2.6 g/dL / Pro: 6.3 g/dL / ALK PHOS: 102 U/L / ALT: 34 U/L / AST: 26 U/L / GGT: x             Culture - Urine (collected 07 Feb 2020 09:40)  Source: .Urine Clean Catch (Midstream)  Final Report (08 Feb 2020 08:39):    No growth        --------------------------------------------------------------------------------------------------  MEDICATIONS  (STANDING):  albuterol/ipratropium for Nebulization. 3 milliLiter(s) Nebulizer every 6 hours  buDESOnide    Inhalation Suspension 0.5 milliGRAM(s) Inhalation every 12 hours  chlorhexidine 2% Cloths 1 Application(s) Topical <User Schedule>  ergocalciferol 33707 Unit(s) Oral every week  guaiFENesin  milliGRAM(s) Oral every 12 hours  nystatin/triamcinolone Cream 1 Application(s) Topical two times a day  pantoprazole Infusion 8 mG/Hr (10 mL/Hr) IV Continuous <Continuous>  Parenteral Nutrition - Adult 1 Each (83 mL/Hr) TPN Continuous <Continuous>  tamsulosin 0.4 milliGRAM(s) Oral at bedtime  triamcinolone 0.1% Ointment 1 Application(s) Topical two times a day    MEDICATIONS  (PRN):  benzocaine 15 mG/menthol 3.6 mG (Sugar-Free) Lozenge 1 Lozenge Oral every 4 hours PRN Sore Throat  HYDROmorphone  Injectable 0.5 milliGRAM(s) IV Push every 3 hours PRN Severe Pain (7 - 10)    --------------------------------------------------------------------------------------------------

## 2020-02-09 NOTE — PROGRESS NOTE ADULT - ASSESSMENT
76 y/o M presenting with septic shock and high grade SBO s/p exploratory laparotomy, lysis of adhesions, decompression of bowel via enterotomy w/ primary repair, and Abthera VAC placement on 12/6; s/p take down of Abthera, washout and re-application of the Abthera vac on 12/8. 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 s/p L chest tube placement by IR 12/30 for pleural effusion now s/p VATS, with chest tube insertion for drainage of pleural effusion. RRT called 1/13 AM for hypoxia, patient transferred back to SICU.  Patient continued SOB, chest drain possible obstruction expanding. Patent taken back to OR emergently 1/15 for clot evac and VATS. Patient is now s/p Ileostomy reversal 2/4/20.    1. Appreciate continued SICU care  2. Continue protonix, F/U GI for possible endoscopy  3. Continue TPN

## 2020-02-09 NOTE — PROGRESS NOTE ADULT - ASSESSMENT
73 y/o male presenting with high grade SBO s/p exploratory laparotomy, lysis of adhesions, decompression of bowel via enterotomy w/ primary repair, & Abthera VAC placement (12/06/2019); RTOR for re-exploration w/ Abthera VAC replacement (12/08/2019) to allow for demarcation of ischemic small bowel; RTOR for re-exploration, small bowel resection of 150 cm (150 cm remaining), ileocecetomy, end ileostomy, mucous fistula, and abdominal closure (12/10/2019); hospital course complicated by SVT, short bowel syndrome requiring repletions w/ IV fluids, malnutrition requiring TPN, intermittent episodes of hypotension, spontaneous left pneumothorax s/p pigtail catheter (12/15/2019-12/24/2019), left pleural effusion s/p pigtail catheter w/ IR (12/30/2019) & subsequent placement of Pleur-X catheter (1/10/2020) c/b left hemothorax s/p left VATS, dysphagia, and oral HSV lesions; now s/p ileostomy reversal    PLAN:    Neuro: acute post-op pain  - Monitor mental status  - Pain control as needed with IV acetaminophen and Dilaudid    Resp: COPD, spontaneous left pneumothorax s/p pigtail catheter, left pleural effusion s/p Pleur-X catheter c/b hemothorax s/p left VATS  - Monitor pulse oximeter  - Pulmicort and Duoneb for COPD  - To follow w/ pulmonologist Dr. Curry after discharge    CV: SVT  - Monitor vital signs    GI: s/p exploratory laparotomy, Grecia, decompression of bowel via enterotomy w/ primary repair, & Abthera VAC placement (12/06/2019); re-exploration w/ ABthera VAC replacement (12/08/2019); s/p re-exploration, small bowel resection of 150 cm (150 cm remaining), ileocecectomy end ileostomy, mucous fistula, & abdominal closure (12/10/2019); s/p ileostomy reversal (2/4/2020); short bowel syndrome, malnutrition, dysphagia  - NGT discontinued, on sips/chips  - Protonix gtt  - Possible EGD today   - Monitor for melena     Renal: urinary retention, polyuria (resolved)  - Monitor I&Os  - Monitor electrolytes and replete as necessary  - intermittent retention, but voiding independently  - Doxazosin for urinary retention      Heme: anemia likely secondary to malnutrition / malabsorption  - Monitor CBC and coags  - Holding VTE prophylaxis in setting of melena and anemia requiring transfusion     ID: oral HSV lesions (s/p acyclovir 12/23/2019-01/02/2020), Pseudomonas PNA (s/p meropenem 1/19/2020-1/26/2020)  - Monitor for clinical evidence of active infection    Endo: no acute issues  - Monitor glucose q6hrs while on TPN    Disposition:  - Full code

## 2020-02-10 LAB
ALBUMIN SERPL ELPH-MCNC: 2.6 G/DL — LOW (ref 3.3–5)
ALP SERPL-CCNC: 103 U/L — SIGNIFICANT CHANGE UP (ref 40–120)
ALT FLD-CCNC: 50 U/L — HIGH (ref 10–45)
ANION GAP SERPL CALC-SCNC: 10 MMOL/L — SIGNIFICANT CHANGE UP (ref 5–17)
AST SERPL-CCNC: 34 U/L — SIGNIFICANT CHANGE UP (ref 10–40)
BILIRUB DIRECT SERPL-MCNC: 0.2 MG/DL — SIGNIFICANT CHANGE UP (ref 0–0.2)
BILIRUB INDIRECT FLD-MCNC: 0.3 MG/DL — SIGNIFICANT CHANGE UP (ref 0.2–1)
BILIRUB SERPL-MCNC: 0.5 MG/DL — SIGNIFICANT CHANGE UP (ref 0.2–1.2)
BLD GP AB SCN SERPL QL: NEGATIVE — SIGNIFICANT CHANGE UP
BUN SERPL-MCNC: 16 MG/DL — SIGNIFICANT CHANGE UP (ref 7–23)
CA-I BLD-SCNC: 1.19 MMOL/L — SIGNIFICANT CHANGE UP (ref 1.12–1.3)
CALCIUM SERPL-MCNC: 8.1 MG/DL — LOW (ref 8.4–10.5)
CHLORIDE SERPL-SCNC: 104 MMOL/L — SIGNIFICANT CHANGE UP (ref 96–108)
CO2 SERPL-SCNC: 25 MMOL/L — SIGNIFICANT CHANGE UP (ref 22–31)
CORTICOSTEROID BINDING GLOBULIN RESULT: 2.3 MG/DL — SIGNIFICANT CHANGE UP
CORTIS F/TOTAL MFR SERPL: 6.2 % — SIGNIFICANT CHANGE UP
CORTIS SERPL-MCNC: 11 UG/DL — SIGNIFICANT CHANGE UP
CORTISOL, FREE RESULT: 0.68 UG/DL — SIGNIFICANT CHANGE UP
CREAT SERPL-MCNC: 0.38 MG/DL — LOW (ref 0.5–1.3)
GLUCOSE SERPL-MCNC: 107 MG/DL — HIGH (ref 70–99)
HCT VFR BLD CALC: 21.6 % — LOW (ref 39–50)
HCT VFR BLD CALC: 23.3 % — LOW (ref 39–50)
HCT VFR BLD CALC: 23.6 % — LOW (ref 39–50)
HCT VFR BLD CALC: 24.2 % — LOW (ref 39–50)
HGB BLD-MCNC: 7 G/DL — CRITICAL LOW (ref 13–17)
HGB BLD-MCNC: 7.4 G/DL — LOW (ref 13–17)
HGB BLD-MCNC: 7.5 G/DL — LOW (ref 13–17)
HGB BLD-MCNC: 7.6 G/DL — LOW (ref 13–17)
MAGNESIUM SERPL-MCNC: 1.9 MG/DL — SIGNIFICANT CHANGE UP (ref 1.6–2.6)
MCHC RBC-ENTMCNC: 28.8 PG — SIGNIFICANT CHANGE UP (ref 27–34)
MCHC RBC-ENTMCNC: 29.5 PG — SIGNIFICANT CHANGE UP (ref 27–34)
MCHC RBC-ENTMCNC: 30.2 PG — SIGNIFICANT CHANGE UP (ref 27–34)
MCHC RBC-ENTMCNC: 30.2 PG — SIGNIFICANT CHANGE UP (ref 27–34)
MCHC RBC-ENTMCNC: 31 GM/DL — LOW (ref 32–36)
MCHC RBC-ENTMCNC: 31.4 GM/DL — LOW (ref 32–36)
MCHC RBC-ENTMCNC: 32.4 GM/DL — SIGNIFICANT CHANGE UP (ref 32–36)
MCHC RBC-ENTMCNC: 32.6 GM/DL — SIGNIFICANT CHANGE UP (ref 32–36)
MCV RBC AUTO: 92.5 FL — SIGNIFICANT CHANGE UP (ref 80–100)
MCV RBC AUTO: 93.1 FL — SIGNIFICANT CHANGE UP (ref 80–100)
MCV RBC AUTO: 93.1 FL — SIGNIFICANT CHANGE UP (ref 80–100)
MCV RBC AUTO: 94 FL — SIGNIFICANT CHANGE UP (ref 80–100)
NRBC # BLD: 0 /100 WBCS — SIGNIFICANT CHANGE UP (ref 0–0)
PHOSPHATE SERPL-MCNC: 3.7 MG/DL — SIGNIFICANT CHANGE UP (ref 2.5–4.5)
PLATELET # BLD AUTO: 284 K/UL — SIGNIFICANT CHANGE UP (ref 150–400)
PLATELET # BLD AUTO: 289 K/UL — SIGNIFICANT CHANGE UP (ref 150–400)
PLATELET # BLD AUTO: 291 K/UL — SIGNIFICANT CHANGE UP (ref 150–400)
PLATELET # BLD AUTO: 317 K/UL — SIGNIFICANT CHANGE UP (ref 150–400)
POTASSIUM SERPL-MCNC: 4.1 MMOL/L — SIGNIFICANT CHANGE UP (ref 3.5–5.3)
POTASSIUM SERPL-SCNC: 4.1 MMOL/L — SIGNIFICANT CHANGE UP (ref 3.5–5.3)
PREALB SERPL-MCNC: 17 MG/DL — LOW (ref 20–40)
PROT SERPL-MCNC: 6.1 G/DL — SIGNIFICANT CHANGE UP (ref 6–8.3)
RBC # BLD: 2.32 M/UL — LOW (ref 4.2–5.8)
RBC # BLD: 2.51 M/UL — LOW (ref 4.2–5.8)
RBC # BLD: 2.52 M/UL — LOW (ref 4.2–5.8)
RBC # BLD: 2.6 M/UL — LOW (ref 4.2–5.8)
RBC # FLD: 14.2 % — SIGNIFICANT CHANGE UP (ref 10.3–14.5)
RBC # FLD: 14.3 % — SIGNIFICANT CHANGE UP (ref 10.3–14.5)
RBC # FLD: 14.4 % — SIGNIFICANT CHANGE UP (ref 10.3–14.5)
RBC # FLD: 14.5 % — SIGNIFICANT CHANGE UP (ref 10.3–14.5)
RH IG SCN BLD-IMP: POSITIVE — SIGNIFICANT CHANGE UP
SODIUM SERPL-SCNC: 139 MMOL/L — SIGNIFICANT CHANGE UP (ref 135–145)
SURGICAL PATHOLOGY STUDY: SIGNIFICANT CHANGE UP
TRIGL SERPL-MCNC: 59 MG/DL — SIGNIFICANT CHANGE UP (ref 10–149)
WBC # BLD: 11.72 K/UL — HIGH (ref 3.8–10.5)
WBC # BLD: 9.17 K/UL — SIGNIFICANT CHANGE UP (ref 3.8–10.5)
WBC # BLD: 9.27 K/UL — SIGNIFICANT CHANGE UP (ref 3.8–10.5)
WBC # BLD: 9.53 K/UL — SIGNIFICANT CHANGE UP (ref 3.8–10.5)
WBC # FLD AUTO: 11.72 K/UL — HIGH (ref 3.8–10.5)
WBC # FLD AUTO: 9.17 K/UL — SIGNIFICANT CHANGE UP (ref 3.8–10.5)
WBC # FLD AUTO: 9.27 K/UL — SIGNIFICANT CHANGE UP (ref 3.8–10.5)
WBC # FLD AUTO: 9.53 K/UL — SIGNIFICANT CHANGE UP (ref 3.8–10.5)

## 2020-02-10 PROCEDURE — 99233 SBSQ HOSP IP/OBS HIGH 50: CPT

## 2020-02-10 PROCEDURE — 99232 SBSQ HOSP IP/OBS MODERATE 35: CPT

## 2020-02-10 PROCEDURE — 99232 SBSQ HOSP IP/OBS MODERATE 35: CPT | Mod: GC

## 2020-02-10 RX ORDER — ELECTROLYTE SOLUTION,INJ
1 VIAL (ML) INTRAVENOUS
Refills: 0 | Status: DISCONTINUED | OUTPATIENT
Start: 2020-02-10 | End: 2020-02-10

## 2020-02-10 RX ORDER — ENOXAPARIN SODIUM 100 MG/ML
40 INJECTION SUBCUTANEOUS DAILY
Refills: 0 | Status: DISCONTINUED | OUTPATIENT
Start: 2020-02-10 | End: 2020-02-10

## 2020-02-10 RX ORDER — MAGNESIUM SULFATE 500 MG/ML
2 VIAL (ML) INJECTION ONCE
Refills: 0 | Status: COMPLETED | OUTPATIENT
Start: 2020-02-10 | End: 2020-02-10

## 2020-02-10 RX ORDER — I.V. FAT EMULSION 20 G/100ML
20.8 EMULSION INTRAVENOUS
Qty: 50 | Refills: 0 | Status: DISCONTINUED | OUTPATIENT
Start: 2020-02-10 | End: 2020-02-11

## 2020-02-10 RX ORDER — ONDANSETRON 8 MG/1
4 TABLET, FILM COATED ORAL ONCE
Refills: 0 | Status: COMPLETED | OUTPATIENT
Start: 2020-02-10 | End: 2020-02-10

## 2020-02-10 RX ADMIN — Medication 3 MILLILITER(S): at 17:24

## 2020-02-10 RX ADMIN — ONDANSETRON 4 MILLIGRAM(S): 8 TABLET, FILM COATED ORAL at 18:01

## 2020-02-10 RX ADMIN — Medication 50 GRAM(S): at 02:53

## 2020-02-10 RX ADMIN — PANTOPRAZOLE SODIUM 10 MG/HR: 20 TABLET, DELAYED RELEASE ORAL at 19:21

## 2020-02-10 RX ADMIN — HYDROMORPHONE HYDROCHLORIDE 0.5 MILLIGRAM(S): 2 INJECTION INTRAMUSCULAR; INTRAVENOUS; SUBCUTANEOUS at 13:55

## 2020-02-10 RX ADMIN — Medication 1 EACH: at 15:34

## 2020-02-10 RX ADMIN — Medication 3 MILLILITER(S): at 12:15

## 2020-02-10 RX ADMIN — Medication 0.5 MILLIGRAM(S): at 05:51

## 2020-02-10 RX ADMIN — HYDROMORPHONE HYDROCHLORIDE 0.5 MILLIGRAM(S): 2 INJECTION INTRAMUSCULAR; INTRAVENOUS; SUBCUTANEOUS at 03:16

## 2020-02-10 RX ADMIN — HYDROMORPHONE HYDROCHLORIDE 0.5 MILLIGRAM(S): 2 INJECTION INTRAMUSCULAR; INTRAVENOUS; SUBCUTANEOUS at 14:10

## 2020-02-10 RX ADMIN — HYDROMORPHONE HYDROCHLORIDE 0.5 MILLIGRAM(S): 2 INJECTION INTRAMUSCULAR; INTRAVENOUS; SUBCUTANEOUS at 03:01

## 2020-02-10 RX ADMIN — Medication 600 MILLIGRAM(S): at 17:01

## 2020-02-10 RX ADMIN — CHLORHEXIDINE GLUCONATE 1 APPLICATION(S): 213 SOLUTION TOPICAL at 06:12

## 2020-02-10 RX ADMIN — PANTOPRAZOLE SODIUM 10 MG/HR: 20 TABLET, DELAYED RELEASE ORAL at 02:54

## 2020-02-10 RX ADMIN — Medication 1 APPLICATION(S): at 06:11

## 2020-02-10 RX ADMIN — Medication 1 APPLICATION(S): at 17:03

## 2020-02-10 RX ADMIN — Medication 0.5 MILLIGRAM(S): at 17:25

## 2020-02-10 RX ADMIN — Medication 1 APPLICATION(S): at 17:02

## 2020-02-10 RX ADMIN — Medication 600 MILLIGRAM(S): at 06:11

## 2020-02-10 RX ADMIN — Medication 3 MILLILITER(S): at 00:43

## 2020-02-10 RX ADMIN — HYDROMORPHONE HYDROCHLORIDE 0.5 MILLIGRAM(S): 2 INJECTION INTRAMUSCULAR; INTRAVENOUS; SUBCUTANEOUS at 19:35

## 2020-02-10 RX ADMIN — Medication 3 MILLILITER(S): at 05:51

## 2020-02-10 RX ADMIN — TAMSULOSIN HYDROCHLORIDE 0.4 MILLIGRAM(S): 0.4 CAPSULE ORAL at 22:27

## 2020-02-10 RX ADMIN — HYDROMORPHONE HYDROCHLORIDE 0.5 MILLIGRAM(S): 2 INJECTION INTRAMUSCULAR; INTRAVENOUS; SUBCUTANEOUS at 19:20

## 2020-02-10 RX ADMIN — I.V. FAT EMULSION 20.8 ML/HR: 20 EMULSION INTRAVENOUS at 15:33

## 2020-02-10 NOTE — PROGRESS NOTE ADULT - ASSESSMENT
A/P: 75 year old male w/ PMH of COPD and EtOH dependence with PSH/o appy, prostate surgery, & spine surgery  septic shock and high grade SBO s/p exploratory laparotomy, lysis of adhesions, decompression of bowel via enterotomy w/ primary repair, and Abthera VAC placement on 12/6; s/p take down of Abthera, washout and re-application of the Abthera vac on 12/8. Now s/p SBR and end ileostomy on 12/10.   TPN consulted to assist w/ management of pt's nutrition in pt w/ prolonged hospital course now tolerating diet but has high Ileostomy output.  Pt s/p Lt Chest Pigtail for effusion in IR with persistent high output on 1/10/2020 pt had VATs & placement of Left PleurX catheter. Pt op pt developed hemothorax and Respiratory Distress.  Pt is s/p Lt chest Evacuation of 2L blood w/ placement of CT x2 on 1/15/2020 for Lt Pleural Effusion that's resolving and doing well after CT removed and resolved Rt PNA vs Pleural Effusion.   Pt s/p Ex Lap, MIRYAM, & Closure of End Ileostomy on 2/4/2020.    Pt's diet advanced to LRD this am  Pt w/ improving severe Protein-Calorie Malnutrition-       Will continue to monitor for improvement of Short Gut Syndrome w/Malabsorption- post op  TPN at GOAL:  Amino Acids 120g, Dextrose 210g, and Lipids 50g in 2000mL with 3mL MTE & 10mL MVI          -GIB source unknown- serial H&H/ exams,  stable after 2U pRBC         Protonix gtt         Pt requesting to hold off additional testing (EGD) at this time  -HypoCa- improving w/ increased Ca in TPN to 14mEq- continue to monitor        Improved Ca levels helpful for BP & muscle contraction  -LowK - improving, will continue w/ 80mEq KCl in TPN        maintaining K & Mg will help with GI motility   -LowMg- will increased Mg to 16mEq  -HypoPhos- improving w/-increased NaPhos & will continue to monitor   -Fecal fat testing suggestive of decreased absorption of fat -        will monitor for improved absorption with improving GI function        TPN w/ MVI to compensate for low Vit E & A        Vit D levels low- being supplemented w/Ergocalciferol         Vit K INR/ PT near normal suggestive that it is being absorbed   -Edema resolved and Fluid overload resolved -continue to monitor        Strict Intake and Output -   -Good glycemic control-  Fingersticks & ISS coverage - as per SICU  -Weights three times a week  -Daily CMP, Mg, Ionized Ca, Phosphorus,        and Weekly Triglycerides and Pre-albumin  Continue as per SICU/Surgery, will follow with you, D/w primary team    Andreina Hubbard PA-C  TPN team, pager [phone]  D/w Ana M Chacko & MU Siegel

## 2020-02-10 NOTE — PROGRESS NOTE ADULT - ASSESSMENT
75 y/o male presenting with high grade SBO s/p exploratory laparotomy, lysis of adhesions, decompression of bowel via enterotomy w/ primary repair, & Abthera VAC placement (12/06/2019); RTOR for re-exploration w/ Abthera VAC replacement (12/08/2019) to allow for demarcation of ischemic small bowel; RTOR for re-exploration, small bowel resection of 150 cm (150 cm remaining), ileocecetomy, end ileostomy, mucous fistula, and abdominal closure (12/10/2019); hospital course complicated by SVT, short bowel syndrome requiring repletions w/ IV fluids, malnutrition requiring TPN, intermittent episodes of hypotension, spontaneous left pneumothorax s/p pigtail catheter (12/15/2019-12/24/2019), left pleural effusion s/p pigtail catheter w/ IR (12/30/2019) & subsequent placement of Pleur-X catheter (1/10/2020) c/b left hemothorax s/p left VATS, dysphagia, and oral HSV lesions; now s/p ileostomy reversal    PLAN:    Neuro: acute post-op pain  - Monitor mental status  - Pain control as needed with IV acetaminophen and Dilaudid    Resp: COPD, spontaneous left pneumothorax s/p pigtail catheter, left pleural effusion s/p Pleur-X catheter c/b hemothorax s/p left VATS  - Monitor pulse oximeter  - Pulmicort and Duoneb for COPD  - To follow w/ pulmonologist Dr. Curry after discharge    CV: SVT  - Monitor vital signs    GI: s/p exploratory laparotomy, Grecia, decompression of bowel via enterotomy w/ primary repair, & Abthera VAC placement (12/06/2019); re-exploration w/ ABthera VAC replacement (12/08/2019); s/p re-exploration, small bowel resection of 150 cm (150 cm remaining), ileocecectomy end ileostomy, mucous fistula, & abdominal closure (12/10/2019); s/p ileostomy reversal (2/4/2020); short bowel syndrome, malnutrition, dysphagia  - on Clear Liquids, tolerating well  - Protonix gtt  - per GI recs, only consider EGD if acute decompensation or hemodynamically unstable.   - Monitor for melena     Renal: urinary retention, polyuria (resolved)  - Monitor I&Os  - Monitor electrolytes and replete as necessary  - voiding independently  - Doxazosin for previous episodes of urinary retention      Heme: anemia likely secondary to malnutrition / malabsorption  - Monitor CBC and coags  - Holding VTE prophylaxis in setting of melena and anemia requiring transfusion     ID: oral HSV lesions (s/p acyclovir 12/23/2019-01/02/2020), Pseudomonas PNA (s/p meropenem 1/19/2020-1/26/2020)  - Monitor for clinical evidence of active infection    Endo: no acute issues  - Monitor glucose q6hrs while on TPN    Disposition:  - Full code

## 2020-02-10 NOTE — PROGRESS NOTE ADULT - SUBJECTIVE AND OBJECTIVE BOX
Surgery Progress Note    SUBJECTIVE/24 HOUR EVENTS:  - Lovenox for VTE prophylaxis held  - Hct downtrending since transfusion  - Patient seen and examined at bedside   - Patient states feeling well, says BM frequency has decreased and appearance is dark colored  - Denies abdominal pain, N/V, Chest pain, SOB  - Tolerating CLD and voiding spontaneously  --------------------------------------------------------------------------------------------------  OBJECTIVE:   Physical Exam:  General: AAOx3, NAD, lying comfortably in bed  HEENT: NC/AT  Respiratory: nonlabored breathing  Cardiovascular: RRR, normal S1 and S2, no murmurs or gallops  Abdomen: non-distended, soft, appropriately tender, abdominal dressings stained with SS fluid.  Extremities: WWP, no edema  --------------------------------------------------------------------------------------------------  V/S:  Vital Signs Last 24 Hrs  T(C): 37.4 (10 Feb 2020 03:00), Max: 37.7 (09 Feb 2020 23:00)  T(F): 99.3 (10 Feb 2020 03:00), Max: 99.9 (09 Feb 2020 23:00)  HR: 89 (10 Feb 2020 07:00) (80 - 103)  BP: 127/72 (10 Feb 2020 07:00) (105/57 - 170/77)  BP(mean): 95 (10 Feb 2020 07:00) (75 - 121)  RR: 24 (10 Feb 2020 07:00) (17 - 31)  SpO2: 98% (10 Feb 2020 07:00) (93% - 100%)    --------------------------------------------------------------------------------------------------  I/Os:    09 Feb 2020 07:01  -  10 Feb 2020 07:00  --------------------------------------------------------  IN:    fat emulsion (Fish Oil and Plant Based) 20% Infusion: 228.8 mL    Oral Fluid: 100 mL    pantoprazole Infusion: 210 mL    Solution: 50 mL    TPN (Total Parenteral Nutrition): 1743 mL  Total IN: 2331.8 mL    OUT:    Voided: 2025 mL  Total OUT: 2025 mL    Total NET: 306.8 mL        --------------------------------------------------------------------------------------------------  LABS:                        7.5    9.17  )-----------( 289      ( 10 Feb 2020 06:21 )             24.2     10 Feb 2020 01:03    139    |  104    |  16     ----------------------------<  107    4.1     |  25     |  0.38     Ca    8.1        10 Feb 2020 01:03  Phos  3.7       10 Feb 2020 01:03  Mg     1.9       10 Feb 2020 01:03    TPro  6.1    /  Alb  2.6    /  TBili  0.5    /  DBili  0.2    /  AST  34     /  ALT  50     /  AlkPhos  103    10 Feb 2020 01:03    PT/INR - ( 08 Feb 2020 08:36 )   PT: 13.0 sec;   INR: 1.13 ratio         PTT - ( 08 Feb 2020 08:36 )  PTT:38.2 sec  CAPILLARY BLOOD GLUCOSE            LIVER FUNCTIONS - ( 10 Feb 2020 01:03 )  Alb: 2.6 g/dL / Pro: 6.1 g/dL / ALK PHOS: 103 U/L / ALT: 50 U/L / AST: 34 U/L / GGT: x             Culture - Urine (collected 07 Feb 2020 09:40)  Source: .Urine Clean Catch (Midstream)  Final Report (08 Feb 2020 08:39):    No growth        --------------------------------------------------------------------------------------------------  MEDICATIONS  (STANDING):  albuterol/ipratropium for Nebulization. 3 milliLiter(s) Nebulizer every 6 hours  buDESOnide    Inhalation Suspension 0.5 milliGRAM(s) Inhalation every 12 hours  chlorhexidine 2% Cloths 1 Application(s) Topical <User Schedule>  ergocalciferol 33087 Unit(s) Oral every week  fat emulsion (Fish Oil and Plant Based) 20% Infusion 20.8 mL/Hr (20.8 mL/Hr) IV Continuous <Continuous>  guaiFENesin  milliGRAM(s) Oral every 12 hours  nystatin/triamcinolone Cream 1 Application(s) Topical two times a day  pantoprazole Infusion 8 mG/Hr (10 mL/Hr) IV Continuous <Continuous>  Parenteral Nutrition - Adult 1 Each (83 mL/Hr) TPN Continuous <Continuous>  tamsulosin 0.4 milliGRAM(s) Oral at bedtime  triamcinolone 0.1% Ointment 1 Application(s) Topical two times a day    MEDICATIONS  (PRN):  benzocaine 15 mG/menthol 3.6 mG (Sugar-Free) Lozenge 1 Lozenge Oral every 4 hours PRN Sore Throat  HYDROmorphone  Injectable 0.5 milliGRAM(s) IV Push every 3 hours PRN Severe Pain (7 - 10)

## 2020-02-10 NOTE — PROGRESS NOTE ADULT - SUBJECTIVE AND OBJECTIVE BOX
Chief Complaint:  Patient is a 75y old  Male who presents with a chief complaint of abd. pain (10 Feb 2020 07:26)      Interval Events: 2 BM last night, dark maroon colored. Denies abdominal pain    Allergies:  IV Contrast (Hives)      Hospital Medications:  albuterol/ipratropium for Nebulization. 3 milliLiter(s) Nebulizer every 6 hours  benzocaine 15 mG/menthol 3.6 mG (Sugar-Free) Lozenge 1 Lozenge Oral every 4 hours PRN  buDESOnide    Inhalation Suspension 0.5 milliGRAM(s) Inhalation every 12 hours  chlorhexidine 2% Cloths 1 Application(s) Topical <User Schedule>  ergocalciferol 23677 Unit(s) Oral every week  fat emulsion (Fish Oil and Plant Based) 20% Infusion 20.8 mL/Hr IV Continuous <Continuous>  guaiFENesin  milliGRAM(s) Oral every 12 hours  HYDROmorphone  Injectable 0.5 milliGRAM(s) IV Push every 3 hours PRN  nystatin/triamcinolone Cream 1 Application(s) Topical two times a day  pantoprazole Infusion 8 mG/Hr IV Continuous <Continuous>  Parenteral Nutrition - Adult 1 Each TPN Continuous <Continuous>  tamsulosin 0.4 milliGRAM(s) Oral at bedtime  triamcinolone 0.1% Ointment 1 Application(s) Topical two times a day      PMHX/PSHX:  COPD with hypoxia  ETOHism  ETOH abuse  History of lumbosacral spine surgery  History of prostate surgery  History of appendectomy  S/P appendectomy      Family history:      ROS:     General:  No wt loss, fevers, chills, night sweats, fatigue,   Eyes:  Good vision, no reported pain  ENT:  No sore throat, pain, runny nose, dysphagia  CV:  No pain, palpitations, hypo/hypertension  Resp:  No dyspnea, cough, tachypnea, wheezing  GI:  See HPI  :  No pain, bleeding, incontinence, nocturia  Muscle:  No pain, weakness  Neuro:  No weakness, tingling, memory problems  Psych:  No fatigue, insomnia, mood problems, depression  Endocrine:  No polyuria, polydipsia, cold/heat intolerance  Heme:  No petechiae, ecchymosis, easy bruisability  Skin:  No rash, edema      PHYSICAL EXAM:     Vital Signs:  Vital Signs Last 24 Hrs  T(C): 37.4 (10 Feb 2020 03:00), Max: 37.7 (2020 23:00)  T(F): 99.3 (10 Feb 2020 03:00), Max: 99.9 (2020 23:00)  HR: 89 (10 Feb 2020 07:00) (80 - 103)  BP: 127/72 (10 Feb 2020 07:00) (111/83 - 170/77)  BP(mean): 95 (10 Feb 2020 07:) (79 - 121)  RR: 24 (10 Feb 2020 07:) (17 - 31)  SpO2: 98% (10 Feb 2020 07:) (93% - 100%)  Daily     Daily Weight in k.5 (10 Feb 2020 00:35)    GENERAL:  appears comfortable, no acute distress  HEENT:  NC/AT,  conjunctivae clear, sclera -anicteric  CHEST:  no increased effort  HEART:  Regular rate and rhythm  ABDOMEN:  Soft, non-tender, non-distended,  no masses ,no hepato-splenomegaly,   EXTREMITIES:  no cyanosis, clubbing or edema  SKIN:  No rash/erythema/ecchymoses/petechiae/wounds  NEURO:  Alert, oriented    LABS:                        7.5    9.17  )-----------( 289      ( 10 Feb 2020 06:21 )             24.2     02-10    139  |  104  |  16  ----------------------------<  107<H>  4.1   |  25  |  0.38<L>    Ca    8.1<L>      10 Feb 2020 01:03  Phos  3.7     02-10  Mg     1.9     02-10    TPro  6.1  /  Alb  2.6<L>  /  TBili  0.5  /  DBili  0.2  /  AST  34  /  ALT  50<H>  /  AlkPhos  103  02-10    LIVER FUNCTIONS - ( 10 Feb 2020 01:03 )  Alb: 2.6 g/dL / Pro: 6.1 g/dL / ALK PHOS: 103 U/L / ALT: 50 U/L / AST: 34 U/L / GGT: x                   Imaging:

## 2020-02-10 NOTE — PROGRESS NOTE ADULT - SUBJECTIVE AND OBJECTIVE BOX
Follow-up Pulm Progress Note    No new respiratory events overnight.  Denies SOB/CP.   Cough improved  Still with GIB   98% on 1L NC    Medications:  MEDICATIONS  (STANDING):  albuterol/ipratropium for Nebulization. 3 milliLiter(s) Nebulizer every 6 hours  buDESOnide    Inhalation Suspension 0.5 milliGRAM(s) Inhalation every 12 hours  chlorhexidine 2% Cloths 1 Application(s) Topical <User Schedule>  enoxaparin Injectable 40 milliGRAM(s) SubCutaneous daily  ergocalciferol 29553 Unit(s) Oral every week  fat emulsion (Fish Oil and Plant Based) 20% Infusion 20.8 mL/Hr (20.8 mL/Hr) IV Continuous <Continuous>  guaiFENesin  milliGRAM(s) Oral every 12 hours  nystatin/triamcinolone Cream 1 Application(s) Topical two times a day  pantoprazole Infusion 8 mG/Hr (10 mL/Hr) IV Continuous <Continuous>  Parenteral Nutrition - Adult 1 Each (83 mL/Hr) TPN Continuous <Continuous>  Parenteral Nutrition - Adult 1 Each (83 mL/Hr) TPN Continuous <Continuous>  tamsulosin 0.4 milliGRAM(s) Oral at bedtime  triamcinolone 0.1% Ointment 1 Application(s) Topical two times a day    MEDICATIONS  (PRN):  benzocaine 15 mG/menthol 3.6 mG (Sugar-Free) Lozenge 1 Lozenge Oral every 4 hours PRN Sore Throat  HYDROmorphone  Injectable 0.5 milliGRAM(s) IV Push every 3 hours PRN Severe Pain (7 - 10)          Vital Signs Last 24 Hrs  T(C): 37 (10 Feb 2020 10:00), Max: 37.7 (09 Feb 2020 23:00)  T(F): 98.6 (10 Feb 2020 10:00), Max: 99.9 (09 Feb 2020 23:00)  HR: 85 (10 Feb 2020 12:16) (80 - 103)  BP: 141/67 (10 Feb 2020 11:00) (111/83 - 165/97)  BP(mean): 96 (10 Feb 2020 11:00) (79 - 121)  RR: 27 (10 Feb 2020 11:00) (17 - 31)  SpO2: 100% (10 Feb 2020 12:16) (93% - 100%) on 1L NC          02-09 @ 07:01  -  02-10 @ 07:00  --------------------------------------------------------  IN: 2331.8 mL / OUT: 2025 mL / NET: 306.8 mL          LABS:                        7.4    9.27  )-----------( 291      ( 10 Feb 2020 12:09 )             23.6     02-10    139  |  104  |  16  ----------------------------<  107<H>  4.1   |  25  |  0.38<L>    Ca    8.1<L>      10 Feb 2020 01:03  Phos  3.7     02-10  Mg     1.9     02-10    TPro  6.1  /  Alb  2.6<L>  /  TBili  0.5  /  DBili  0.2  /  AST  34  /  ALT  50<H>  /  AlkPhos  103  02-10          CAPILLARY BLOOD GLUCOSE                            CULTURES: (if applicable)  Culture Results:   No growth (02-07 @ 09:40)    Most recent blood culture -- 02-07 @ 09:40   -- -- .Urine Clean Catch (Midstream) 02-07 @ 09:40        Physical Examination:  PULM: Diminished throughout   CVS: S1, S2 heard    RADIOLOGY REVIEWED  CXR: L costophrenic angle off film  L basilar atelectasis

## 2020-02-10 NOTE — PROGRESS NOTE ADULT - SUBJECTIVE AND OBJECTIVE BOX
HISTORY  75y Male  COPD and EtOH dependence who presented on 12/6/2019 with abdominal pain, nausea, hematemesis, and poor PO intake for ~2-3 days. In the ED, he was found to be hypotensive & tachycardic. Labs revealed an CHI and lactic acidosis. Imaging revealed a high grade SBO in the RLQ on CT scan. Hospital course is as follows:  12/06 - s/p exploratory laparotomy, lysis of adhesions, decompression of bowel via enterotomy w/ primary repair, and Abthera VAC placement as the distal 50% of the bowel appeared dusky but was still viable, admitted to SICU post-operatively as he was on vasopressor support and was left intubated  12/08 - s/p re-exploration, proximal 165 cm of small bowel appeared pink & viable but beyond that had patchy areas of ischemia so decision was made to give the bowel more time to demarcate before resecting in order to preserve as much small bowel as possible so Abthera VAC was replaced  12/09 - s/p re-exploration, small bowel resection of 150 cm (150 cm remaining), ileocecetomy, end ileostomy, mucous fistula, and abdominal closure  12/11 - extubated to BiPAP, noted to be in SVT that was rate controlled w/ metoprolol  12/14 - started on TPN  12/15 - noted to have spontaneous left pneumothorax s/p left pigtail catheter placement, noted to be in SVT again so amiodarone started, high ileostomy output noted so concern for short bowel syndrome  12/16 - amiodarone discontinued, started on beta blocker with metoprolol  12/18 - started Lomotil and ileostomy repletions with IV fluids  12/19 - started Imodium, left pigtail catheter was placed to water seal but pneumothorax noted on follow-up CXR so placed back to suction  12/20 - started tincture of opium, concern for aspiration so changed diet from regular to dysphagia 1 pureed with nectar-thickened liquids  12/22 - left pigtail catheter placed to water seal with no pneumothorax noted on follow-up CXR, CT chest with moderate left pleural effusion not being drained by pigtail catheter  12/23 - 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  12/24 - left pigtail catheter discontinued  12/28 - started Ancef for erythematous midline wound  12/29 - beta blocker discontinued for intermittent episodes of hypotension  12/30 - left pigtail catheter placement by IR with drainage of serous transudative fluid  01/02 - FEES demonstrating penetration with thin liquids so patient kept on dysphagia 1 pureed with nectar-thickened liquids  01/07 - transferred to floors  01/10 - s/p left VATS w/ PleurX catheter placement  01/11 - evaluated by speech & swallow, advanced diet to mechanical soft with thin liquids  01/12 - PleurX catheter placed to water seal with on pneumothorax on follow-up CXR  01/13 - RRT for hypoxemia, placed on BiPAP, PleurX catheter placed back to suction, started vancomycin & Zosyn for possible HCAP  01/14 - noted to have minimal output from PleurX catheter, lung ultrasound with large left pleural effusion concerning for hemothorax, multiple episodes of bradycardia secondary to hypoxemia, remains on BiPAP  01/15 - worsening respiratory distress requiring intubation, hypotensive requiring vasopressor support, CT chest with large left hemothorax w/ trace pneumothorax & extensive subcutaneous emphysema so taken to OR emergently for left VATS, evacuation fo 2 L of clot, and placement of two left chest tubes  01/16 - extubated, weaned off vasopressor support  01/17 - two left chest tubes placed to water seal with no pneumothorax noted on follow-up CXR  01/22 - inferior chest tube discontinued with no pneumothorax noted on follow-up CXR, polyuria noted  01/24 - given DDAVP for polyuria w/ good response  01/25 - superior chest tube discontinued with no pneumothorax noted on follow-up CXR  02/04 - s/p exploratory laparotomy, lysis of adhesions, and ileostomy reversal      24 HOUR EVENTS:  - Lovenox for VTE prophylaxis held  - Hct slowly dropping since transfusion    SUBJECTIVE/ROS:  [X] A ten-point review of systems was otherwise negative except as noted.  [ ] Due to altered mental status/intubation, subjective information were not able to be obtained from the patient. History was obtained, to the extent possible, from review of the chart and collateral sources of information.      NEURO  Exam: Awake, Alert, and Oriented  Meds: HYDROmorphone  Injectable 0.5 milliGRAM(s) IV Push every 3 hours PRN Severe Pain (7 - 10)    [x] Adequacy of sedation and pain control has been assessed and adjusted      RESPIRATORY  RR: 19 (02-09-20 @ 23:00) (17 - 31)  SpO2: 98% (02-09-20 @ 23:00) (91% - 100%)  Wt(kg): --  Exam: unlabored, clear to auscultation bilaterally  Mechanical Ventilation:     [ ] Extubation Readiness Assessed  Meds: albuterol/ipratropium for Nebulization. 3 milliLiter(s) Nebulizer every 6 hours  buDESOnide    Inhalation Suspension 0.5 milliGRAM(s) Inhalation every 12 hours  guaiFENesin  milliGRAM(s) Oral every 12 hours        CARDIOVASCULAR  HR: 90 (02-09-20 @ 23:00) (81 - 112)  BP: 147/70 (02-09-20 @ 23:00) (105/57 - 170/77)  BP(mean): 100 (02-09-20 @ 23:00) (75 - 121)    Exam: regular rate and rhythm  Cardiac Rhythm: sinus  Perfusion     [X]Adequate   [ ]Inadequate  Mentation   [X]Normal       [ ]Reduced  Extremities  [X]Warm         [ ]Cool  Volume Status [ ]Hypervolemic [X]Euvolemic [ ]Hypovolemic  Meds: tamsulosin 0.4 milliGRAM(s) Oral at bedtime        GI/NUTRITION  Exam: soft, nondistended. Tender to deep palpation. Midline incision with clean dressing overtop.  Diet: Clear Thin Liquids with TPN  Meds: pantoprazole Infusion 8 mG/Hr IV Continuous <Continuous>      GENITOURINARY  I&O's Detail    02-08 @ 07:01  -  02-09 @ 07:00  --------------------------------------------------------  IN:    fat emulsion (Fish Oil and Plant Based) 20% Infusion: 270.4 mL    Oral Fluid: 120 mL    Packed Red Blood Cells: 300 mL    pantoprazole Infusion: 120 mL    Solution: 50 mL    TPN (Total Parenteral Nutrition): 1992 mL  Total IN: 2852.4 mL    OUT:    Voided: 1800 mL  Total OUT: 1800 mL    Total NET: 1052.4 mL      02-09 @ 07:01  -  02-10 @ 00:29  --------------------------------------------------------  IN:    fat emulsion (Fish Oil and Plant Based) 20% Infusion: 104 mL    Oral Fluid: 100 mL    pantoprazole Infusion: 130 mL    TPN (Total Parenteral Nutrition): 1079 mL  Total IN: 1413 mL    OUT:    Voided: 1225 mL  Total OUT: 1225 mL    Total NET: 188 mL      02-09    134<L>  |  102  |  15  ----------------------------<  107<H>  4.1   |  23  |  0.34<L>    Ca    8.2<L>      09 Feb 2020 05:42  Phos  3.5     02-09  Mg     1.7     02-09    TPro  6.3  /  Alb  2.6<L>  /  TBili  0.6  /  DBili  0.3<H>  /  AST  26  /  ALT  34  /  AlkPhos  102  02-08    [ ] Martinez catheter, indication: N/A  Meds: ergocalciferol 92199 Unit(s) Oral every week  fat emulsion (Fish Oil and Plant Based) 20% Infusion 20.8 mL/Hr IV Continuous <Continuous>  Parenteral Nutrition - Adult 1 Each TPN Continuous <Continuous>        HEMATOLOGIC  Meds:   [ ] VTE Prophylaxis                        7.6    9.73  )-----------( 286      ( 09 Feb 2020 19:05 )             24.1     PT/INR - ( 08 Feb 2020 08:36 )   PT: 13.0 sec;   INR: 1.13 ratio         PTT - ( 08 Feb 2020 08:36 )  PTT:38.2 sec  Transfusion     [ ] PRBC   [ ] Platelets   [ ] FFP   [ ] Cryoprecipitate      INFECTIOUS DISEASES  T(C): 37.1 (02-09-20 @ 19:00), Max: 37.3 (02-09-20 @ 03:00)  Wt(kg): --  WBC Count: 9.73 K/uL (02-09 @ 19:05)  WBC Count: 8.99 K/uL (02-09 @ 10:24)  WBC Count: 9.55 K/uL (02-09 @ 05:42)    Recent Cultures:  Specimen Source: .Urine Clean Catch (Midstream), 02-07 @ 09:40; Results   No growth; Gram Stain: --; Organism: --    Meds:       ENDOCRINE  Capillary Blood Glucose    Meds:       ACCESS DEVICES:  [ ] Peripheral IV  [ ] Central Venous Line	[ ] R	[ ] L	[ ] IJ	[ ] Fem	[ ] SC	Placed:   [ ] Arterial Line		[ ] R	[ ] L	[ ] Fem	[ ] Rad	[ ] Ax	Placed:   [ ] PICC:					[ ] Mediport  [ ] Urinary Catheter, Date Placed:   [ ] Necessity of urinary, arterial, and venous catheters discussed    OTHER MEDICATIONS:  benzocaine 15 mG/menthol 3.6 mG (Sugar-Free) Lozenge 1 Lozenge Oral every 4 hours PRN  chlorhexidine 2% Cloths 1 Application(s) Topical <User Schedule>  nystatin/triamcinolone Cream 1 Application(s) Topical two times a day  triamcinolone 0.1% Ointment 1 Application(s) Topical two times a day      CODE STATUS:     IMAGING:

## 2020-02-10 NOTE — PROGRESS NOTE ADULT - SUBJECTIVE AND OBJECTIVE BOX
[facility] NUTRITION SUPPORT / TPN -- FOLLOW UP NOTE  --------------------------------------------------------------------------------    24 hour events/subjective:    BM slowing down- less frequent- 3 yesterday am and one in the evening        no BM this am as of time of exam       Less watery but loose       "melanic" odor to stool, but no gross blood  Pt on Protonix gtt  Pt deferring EGD or other work up of GIB at this time  Pt given 2UpRBC yesterday,  H&H stable  (+) gas   Tolerating clears advanced to LRD this am  Pain appropriate - relief w/ pain Rx  Continued Urinary retention- requiring straight cath- Cx sent  No n/v  No cough/ cp/ palp/dyspnea/ sob  No f/c/s  Pt OOB to chair and ambulated in the afternoon        Diet:  Diet, Low Fiber:   Supplement Feeding Modality:  Oral  Ensure Clear Cans or Servings Per Day:  2       Frequency:  Three Times a day (02-10-20 @ 08:21)      Appetite: [  ]Poor [ x ]Adequate [  ]Good  Caloric intake:  [x   ]  Adequate   [   ] Inadequate    ROS: General/ GI see HPI  all other systems negative    ALLERGIES & MEDICATIONS  --------------------------------------------------------------------------------  ALLERGIES  IV Contrast (Hives)        STANDING INPATIENT MEDICATIONS    albuterol/ipratropium for Nebulization. 3 milliLiter(s) Nebulizer every 6 hours  buDESOnide    Inhalation Suspension 0.5 milliGRAM(s) Inhalation every 12 hours  chlorhexidine 2% Cloths 1 Application(s) Topical <User Schedule>  ergocalciferol 06334 Unit(s) Oral every week  fat emulsion (Fish Oil and Plant Based) 20% Infusion 20.8 mL/Hr IV Continuous <Continuous>  guaiFENesin  milliGRAM(s) Oral every 12 hours  nystatin/triamcinolone Cream 1 Application(s) Topical two times a day  pantoprazole Infusion 8 mG/Hr IV Continuous <Continuous>  Parenteral Nutrition - Adult 1 Each TPN Continuous <Continuous>  Parenteral Nutrition - Adult 1 Each TPN Continuous <Continuous>  tamsulosin 0.4 milliGRAM(s) Oral at bedtime  triamcinolone 0.1% Ointment 1 Application(s) Topical two times a day      PRN INPATIENT MEDICATION  benzocaine 15 mG/menthol 3.6 mG (Sugar-Free) Lozenge 1 Lozenge Oral every 4 hours PRN  HYDROmorphone  Injectable 0.5 milliGRAM(s) IV Push every 3 hours PRN        VITALS/PHYSICAL EXAM  --------------------------------------------------------------------------------  T(C): 37 (02-10-20 @ 10:00), Max: 37.7 (02-09-20 @ 23:00)  HR: 96 (02-10-20 @ 10:00) (80 - 103)  BP: 135/64 (02-10-20 @ 10:00) (111/83 - 165/97)  RR: 25 (02-10-20 @ 10:00) (17 - 31)  SpO2: 97% (02-10-20 @ 10:00) (93% - 100%)  Wt(kg): --        02-09-20 @ 07:01  -  02-10-20 @ 07:00  --------------------------------------------------------  IN: 2331.8 mL / OUT: 2025 mL / NET: 306.8 mL    02-10-20 @ 07:01  -  02-10-20 @ 11:28  --------------------------------------------------------  IN: 722 mL / OUT: 475 mL / NET: 247 mL    PHYSICAL EXAM:  --------------------------------------------------------------------------------  	Gen: guarded but stable, A&Ox3, NC O2  	HEENT: NC/AT, PERRL, supple neck, trachea midline, mucosa moist              GI: (+) BS, softly distended, appropriately tender                    midline dressing c/d/i                    Rt sided  ostomy site dressing c/d/i              MSK: FROM x4, no contractures  	Vascular: Equally Warm,  no edema, no clubbing, cyanosis,                      RUE PICC w/o sx infection   	Neuro: No focal deficits, intact sensation, weakened strength BLE>BUE  	Psych: Normal affect and mood              skin: moist, erythematous pinpoint rash chest/ back, groin        LABS/ CULTURES/ RADIOLOGY:              7.5    9.17  >-----------<  289      [02-10-20 @ 06:21]              24.2     139  |  104  |  16  ----------------------------<  107      [02-10-20 @ 01:03]  4.1   |  25  |  0.38        Ca     8.1     [02-10-20 @ 01:03]      iCa    1.19     [02-10 @ 01:08]      Mg     1.9     [02-10-20 @ 01:03]      Phos  3.7     [02-10-20 @ 01:03]    TPro  6.1  /  Alb  2.6  /  TBili  0.5  /  DBili  0.2  /  AST  34  /  ALT  50  /  AlkPhos  103  [02-10-20 @ 01:03]      Prealbumin, Serum: 17 mg/dL (02-10-20 @ 09:15)  Prealbumin, Serum: 21 mg/dL (02-03-20 @ 23:50)  Prealbumin, Serum: 19 mg/dL (02-03-20 @ 07:18)  Prealbumin, Serum: 18 mg/dL (02-02-20 @ 09:48)  Prealbumin, Serum: 18 mg/dL (01-31-20 @ 07:40)    Triglycerides, Serum: 59 mg/dL (02.10.20 @ 01:03)  Triglycerides, Serum: 59 mg/dL (02.03.20 @ 00:24)  Triglycerides, Serum: 61 mg/dL (02.02.20 @ 01:41)  Triglycerides, Serum: 51 mg/dL (01.31.20 @ 04:10)  Triglycerides, Serum: 83 mg/dL (01.27.20 @ 00:14)  Triglycerides, Serum: 50 mg/dL (01.07.20 @ 01:05) [facility] NUTRITION SUPPORT / TPN -- FOLLOW UP NOTE  --------------------------------------------------------------------------------    24 hour events/subjective:    BM slowing down- less frequent- 3 yesterday am and one in the evening        no BM this am as of time of exam       Less watery but loose       "melanic" odor to stool, but no gross blood  Pt on Protonix gtt  Pt deferring EGD or other work up of GIB at this time  Pt given 2UpRBC yesterday,  H&H stable  (+) gas   Tolerating clears advanced to LRD this am  Pain appropriate - relief w/ pain Rx  Urinary retention improved w/ Cardura now on Flomax  No n/v  No cough/ cp/ palp/dyspnea/ sob  No f/c/s  Pt plans to be OOB to chair and ambulate in the afternoon        Diet:  Diet, Low Fiber:   Supplement Feeding Modality:  Oral  Ensure Clear Cans or Servings Per Day:  2       Frequency:  Three Times a day (02-10-20 @ 08:21)      Appetite: [  ]Poor [ x ]Adequate [  ]Good  Caloric intake:  [x   ]  Adequate   [   ] Inadequate    ROS: General/ GI see HPI  all other systems negative    ALLERGIES & MEDICATIONS  --------------------------------------------------------------------------------  ALLERGIES  IV Contrast (Hives)        STANDING INPATIENT MEDICATIONS    albuterol/ipratropium for Nebulization. 3 milliLiter(s) Nebulizer every 6 hours  buDESOnide    Inhalation Suspension 0.5 milliGRAM(s) Inhalation every 12 hours  chlorhexidine 2% Cloths 1 Application(s) Topical <User Schedule>  ergocalciferol 69715 Unit(s) Oral every week  fat emulsion (Fish Oil and Plant Based) 20% Infusion 20.8 mL/Hr IV Continuous <Continuous>  guaiFENesin  milliGRAM(s) Oral every 12 hours  nystatin/triamcinolone Cream 1 Application(s) Topical two times a day  pantoprazole Infusion 8 mG/Hr IV Continuous <Continuous>  Parenteral Nutrition - Adult 1 Each TPN Continuous <Continuous>  Parenteral Nutrition - Adult 1 Each TPN Continuous <Continuous>  tamsulosin 0.4 milliGRAM(s) Oral at bedtime  triamcinolone 0.1% Ointment 1 Application(s) Topical two times a day      PRN INPATIENT MEDICATION  benzocaine 15 mG/menthol 3.6 mG (Sugar-Free) Lozenge 1 Lozenge Oral every 4 hours PRN  HYDROmorphone  Injectable 0.5 milliGRAM(s) IV Push every 3 hours PRN        VITALS/PHYSICAL EXAM  --------------------------------------------------------------------------------  T(C): 37 (02-10-20 @ 10:00), Max: 37.7 (02-09-20 @ 23:00)  HR: 96 (02-10-20 @ 10:00) (80 - 103)  BP: 135/64 (02-10-20 @ 10:00) (111/83 - 165/97)  RR: 25 (02-10-20 @ 10:00) (17 - 31)  SpO2: 97% (02-10-20 @ 10:00) (93% - 100%)  Wt(kg): --        02-09-20 @ 07:01  -  02-10-20 @ 07:00  --------------------------------------------------------  IN: 2331.8 mL / OUT: 2025 mL / NET: 306.8 mL    02-10-20 @ 07:01  -  02-10-20 @ 11:28  --------------------------------------------------------  IN: 722 mL / OUT: 475 mL / NET: 247 mL    PHYSICAL EXAM:  --------------------------------------------------------------------------------  	Gen: guarded but stable, A&Ox3, NC O2  	HEENT: NC/AT, PERRL, supple neck, trachea midline, mucosa moist              GI: (+) BS, softly distended, appropriately tender                    midline dressing c/d/i                    Rt sided  ostomy site dressing c/d/i              MSK: FROM x4, no contractures  	Vascular: Equally Warm,  no edema, no clubbing, cyanosis,                      RUE PICC w/o sx infection   	Neuro: No focal deficits, intact sensation, weakened strength BLE>BUE  	Psych: Normal affect and mood              skin: moist, erythematous pinpoint rash chest/ back, groin        LABS/ CULTURES/ RADIOLOGY:              7.5    9.17  >-----------<  289      [02-10-20 @ 06:21]              24.2     139  |  104  |  16  ----------------------------<  107      [02-10-20 @ 01:03]  4.1   |  25  |  0.38        Ca     8.1     [02-10-20 @ 01:03]      iCa    1.19     [02-10 @ 01:08]      Mg     1.9     [02-10-20 @ 01:03]      Phos  3.7     [02-10-20 @ 01:03]    TPro  6.1  /  Alb  2.6  /  TBili  0.5  /  DBili  0.2  /  AST  34  /  ALT  50  /  AlkPhos  103  [02-10-20 @ 01:03]      Prealbumin, Serum: 17 mg/dL (02-10-20 @ 09:15)  Prealbumin, Serum: 21 mg/dL (02-03-20 @ 23:50)  Prealbumin, Serum: 19 mg/dL (02-03-20 @ 07:18)  Prealbumin, Serum: 18 mg/dL (02-02-20 @ 09:48)  Prealbumin, Serum: 18 mg/dL (01-31-20 @ 07:40)    Triglycerides, Serum: 59 mg/dL (02.10.20 @ 01:03)  Triglycerides, Serum: 59 mg/dL (02.03.20 @ 00:24)  Triglycerides, Serum: 61 mg/dL (02.02.20 @ 01:41)  Triglycerides, Serum: 51 mg/dL (01.31.20 @ 04:10)  Triglycerides, Serum: 83 mg/dL (01.27.20 @ 00:14)  Triglycerides, Serum: 50 mg/dL (01.07.20 @ 01:05)

## 2020-02-10 NOTE — PROGRESS NOTE ADULT - ASSESSMENT
76 y/o M presenting with septic shock and high grade SBO s/p exploratory laparotomy, lysis of adhesions, decompression of bowel via enterotomy w/ primary repair, and Abthera VAC placement on 12/6; s/p take down of Abthera, washout and re-application of the Abthera vac on 12/8. 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 s/p L chest tube placement by IR 12/30 for pleural effusion now s/p VATS, with chest tube insertion for drainage of pleural effusion. RRT called 1/13 AM for hypoxia, patient transferred back to SICU.  Patient continued SOB, chest drain possible obstruction expanding. Patent taken back to OR emergently 1/15 for clot evac and VATS. Patient is now s/p Ileostomy reversal 2/4/20.    PLAN:  - F/U H/H  - Appreciate GI eval/recs: Will consider EGD if patient becomes hemodynamically unstable.   - Monitor BM frequency and appearance  - Advance diet as tolerated  - continue TPN, will calorie count once on regular diet  - OOB    Red [facility]  [id number] 74 y/o M presenting with septic shock and high grade SBO s/p exploratory laparotomy, lysis of adhesions, decompression of bowel via enterotomy w/ primary repair, and Abthera VAC placement on 12/6; s/p take down of Abthera, washout and re-application of the Abthera vac on 12/8. 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 s/p L chest tube placement by IR 12/30 for pleural effusion now s/p VATS, with chest tube insertion for drainage of pleural effusion. RRT called 1/13 AM for hypoxia, patient transferred back to SICU.  Patient continued SOB, chest drain possible obstruction expanding. Patent taken back to OR emergently 1/15 for clot evac and VATS. Patient is now s/p Ileostomy reversal 2/4/20.    PLAN:  - F/U H/H  - Advance diet to LRD  - Contact GI to eval/rec for melena  - Appreciate GI eval/recs: Will consider EGD if patient becomes hemodynamically unstable.   - Monitor BM frequency and appearance  - continue TPN, will calorie count once on regular diet  - OOB    Red Surgery  [id number]

## 2020-02-10 NOTE — PROGRESS NOTE ADULT - ASSESSMENT
Impression:  # Maroon Stool: BUN/Cr stable, hgb relatively stable. Bleeding likely resolving. Most likely source of bleeding from surgical site.D/w patient at length on 2/9 performing EGD and patient prefers not to have endoscopes unless on emergent basis. Unclear if surgical site can be reached by endoscopy. Remains hemodynamically stable now  # SBO high grade s/p multiple surgeries  # Shock: resolved    Recommendation:  - continue with IV PPI  - diet as tolerated  - can consider EGD on non urgent basis if counts down trend   - defer to surgery need to assess surgical site  - trend CBC, CMP, INR  - 2 large bore IVs; active type and screen  - transfuse Hgb > 7, Platelets > 50  - supportive care as per primary team Impression:  # Maroon Stool: BUN/Cr stable, hgb relatively stable. Bleeding likely resolving. Most likely source of bleeding from surgical site.D/w patient at length on 2/9 performing EGD and patient prefers not to have endoscopes unless on emergent basis. Unclear if surgical site can be reached by endoscopy. Remains hemodynamically stable now  # SBO high grade s/p multiple surgeries  # Shock: resolved    Recommendation:  - continue with IV PPI  - diet as tolerated  - can consider EGD on non urgent basis if counts down trend and if patient is agreeable (He wants to hold off for now)  - defer to surgery need to assess surgical site  - trend CBC, CMP, INR  - 2 large bore IVs; active type and screen  - transfuse Hgb > 7, Platelets > 50  - supportive care as per primary team

## 2020-02-10 NOTE — PROGRESS NOTE ADULT - ATTENDING COMMENTS
Patient seen and examined on SICU rounds   Doing well, no acute complaints  Vitals stable  abdominal exam benign    Still with intermittent melanotic bowel movements    hct= 24 -> 23 - > 21.6    - I have discussed the need for an EGD with the patient this AM.  He is agreeable to procedure at this time  - Care discussed with GI team by SICU service - for EGD in 2/11/20 AM  - Will continue IV PPI infusion, blood product resuscitation as tolerated

## 2020-02-10 NOTE — PROGRESS NOTE ADULT - SUBJECTIVE AND OBJECTIVE BOX
Subjective: Patient seen and examined. No new events except as noted.   remains in ICU   started on Po   EGD cancelled as melena stopped   no cp or sob     REVIEW OF SYSTEMS:    CONSTITUTIONAL:+ weakness, fevers or chills  EYES/ENT: No visual changes;  No vertigo or throat pain   NECK: No pain or stiffness  RESPIRATORY: No cough, wheezing, hemoptysis; No shortness of breath  CARDIOVASCULAR: No chest pain or palpitations  GASTROINTESTINAL: No abdominal or epigastric pain. No nausea, vomiting, or hematemesis; No diarrhea or constipation. No melena or hematochezia.  GENITOURINARY: No dysuria, frequency or hematuria  NEUROLOGICAL: No numbness or weakness  SKIN: No itching, burning, rashes, or lesions   All other review of systems is negative unless indicated above.    MEDICATIONS:  MEDICATIONS  (STANDING):  albuterol/ipratropium for Nebulization. 3 milliLiter(s) Nebulizer every 6 hours  buDESOnide    Inhalation Suspension 0.5 milliGRAM(s) Inhalation every 12 hours  chlorhexidine 2% Cloths 1 Application(s) Topical <User Schedule>  ergocalciferol 08702 Unit(s) Oral every week  fat emulsion (Fish Oil and Plant Based) 20% Infusion 20.8 mL/Hr (20.8 mL/Hr) IV Continuous <Continuous>  guaiFENesin  milliGRAM(s) Oral every 12 hours  nystatin/triamcinolone Cream 1 Application(s) Topical two times a day  pantoprazole Infusion 8 mG/Hr (10 mL/Hr) IV Continuous <Continuous>  Parenteral Nutrition - Adult 1 Each (83 mL/Hr) TPN Continuous <Continuous>  tamsulosin 0.4 milliGRAM(s) Oral at bedtime  triamcinolone 0.1% Ointment 1 Application(s) Topical two times a day      PHYSICAL EXAM:  T(C): 37.3 (02-10-20 @ 19:00), Max: 37.7 (02-09-20 @ 23:00)  HR: 99 (02-10-20 @ 19:00) (80 - 104)  BP: 142/67 (02-10-20 @ 19:00) (113/60 - 165/97)  RR: 23 (02-10-20 @ 19:00) (18 - 29)  SpO2: 94% (02-10-20 @ 19:00) (94% - 100%)  Wt(kg): --  I&O's Summary    09 Feb 2020 07:01  -  10 Feb 2020 07:00  --------------------------------------------------------  IN: 2331.8 mL / OUT: 2025 mL / NET: 306.8 mL    10 Feb 2020 07:01  -  10 Feb 2020 19:59  --------------------------------------------------------  IN: 1976.2 mL / OUT: 775 mL / NET: 1201.2 mL          Appearance: NAD  HEENT:   Normal oral mucosa, PERRL, EOMI	  Lymphatic: No lymphadenopathy , no edema  Cardiovascular: Normal S1 S2, No JVD, No murmurs , Peripheral pulses palpable 2+ bilaterally  Respiratory: Lungs clear to auscultation, normal effort 	  Gastrointestinal:  Soft, Non-tender, + BS	  Skin: No rashes, No ecchymoses, No cyanosis, warm to touch  Musculoskeletal: Normal range of motion, normal strength  Psychiatry:  Mood & affect appropriate  Ext: No edema      LABS:    CARDIAC MARKERS:                                7.4    9.27  )-----------( 291      ( 10 Feb 2020 12:09 )             23.6     02-10    139  |  104  |  16  ----------------------------<  107<H>  4.1   |  25  |  0.38<L>    Ca    8.1<L>      10 Feb 2020 01:03  Phos  3.7     02-10  Mg     1.9     02-10    TPro  6.1  /  Alb  2.6<L>  /  TBili  0.5  /  DBili  0.2  /  AST  34  /  ALT  50<H>  /  AlkPhos  103  02-10    proBNP:   Lipid Profile:   HgA1c:   TSH:             TELEMETRY: 	  SR  ECG:  	  RADIOLOGY:   DIAGNOSTIC TESTING:  [ ] Echocardiogram:  [ ]  Catheterization:  [ ] Stress Test:    OTHER:

## 2020-02-11 LAB
ALBUMIN SERPL ELPH-MCNC: 2.2 G/DL — LOW (ref 3.3–5)
ALP SERPL-CCNC: 99 U/L — SIGNIFICANT CHANGE UP (ref 40–120)
ALT FLD-CCNC: 63 U/L — HIGH (ref 10–45)
ANION GAP SERPL CALC-SCNC: 9 MMOL/L — SIGNIFICANT CHANGE UP (ref 5–17)
AST SERPL-CCNC: 37 U/L — SIGNIFICANT CHANGE UP (ref 10–40)
BILIRUB DIRECT SERPL-MCNC: 0.2 MG/DL — SIGNIFICANT CHANGE UP (ref 0–0.2)
BILIRUB INDIRECT FLD-MCNC: 0.2 MG/DL — SIGNIFICANT CHANGE UP (ref 0.2–1)
BILIRUB SERPL-MCNC: 0.4 MG/DL — SIGNIFICANT CHANGE UP (ref 0.2–1.2)
BUN SERPL-MCNC: 19 MG/DL — SIGNIFICANT CHANGE UP (ref 7–23)
CA-I BLD-SCNC: 1.16 MMOL/L — SIGNIFICANT CHANGE UP (ref 1.12–1.3)
CALCIUM SERPL-MCNC: 7.8 MG/DL — LOW (ref 8.4–10.5)
CHLORIDE SERPL-SCNC: 100 MMOL/L — SIGNIFICANT CHANGE UP (ref 96–108)
CO2 SERPL-SCNC: 23 MMOL/L — SIGNIFICANT CHANGE UP (ref 22–31)
CREAT SERPL-MCNC: 0.4 MG/DL — LOW (ref 0.5–1.3)
GAS PNL BLDV: SIGNIFICANT CHANGE UP
GLUCOSE SERPL-MCNC: 125 MG/DL — HIGH (ref 70–99)
HCT VFR BLD CALC: 21.4 % — LOW (ref 39–50)
HCT VFR BLD CALC: 23.8 % — LOW (ref 39–50)
HCT VFR BLD CALC: 24.7 % — LOW (ref 39–50)
HCT VFR BLD CALC: 24.8 % — LOW (ref 39–50)
HGB BLD-MCNC: 6.7 G/DL — CRITICAL LOW (ref 13–17)
HGB BLD-MCNC: 7.5 G/DL — LOW (ref 13–17)
HGB BLD-MCNC: 7.8 G/DL — LOW (ref 13–17)
HGB BLD-MCNC: 7.9 G/DL — LOW (ref 13–17)
MAGNESIUM SERPL-MCNC: 1.8 MG/DL — SIGNIFICANT CHANGE UP (ref 1.6–2.6)
MCHC RBC-ENTMCNC: 29.5 PG — SIGNIFICANT CHANGE UP (ref 27–34)
MCHC RBC-ENTMCNC: 29.8 PG — SIGNIFICANT CHANGE UP (ref 27–34)
MCHC RBC-ENTMCNC: 29.9 PG — SIGNIFICANT CHANGE UP (ref 27–34)
MCHC RBC-ENTMCNC: 29.9 PG — SIGNIFICANT CHANGE UP (ref 27–34)
MCHC RBC-ENTMCNC: 31.3 GM/DL — LOW (ref 32–36)
MCHC RBC-ENTMCNC: 31.5 GM/DL — LOW (ref 32–36)
MCHC RBC-ENTMCNC: 31.5 GM/DL — LOW (ref 32–36)
MCHC RBC-ENTMCNC: 32 GM/DL — SIGNIFICANT CHANGE UP (ref 32–36)
MCV RBC AUTO: 93.6 FL — SIGNIFICANT CHANGE UP (ref 80–100)
MCV RBC AUTO: 94.3 FL — SIGNIFICANT CHANGE UP (ref 80–100)
MCV RBC AUTO: 94.7 FL — SIGNIFICANT CHANGE UP (ref 80–100)
MCV RBC AUTO: 94.8 FL — SIGNIFICANT CHANGE UP (ref 80–100)
NRBC # BLD: 0 /100 WBCS — SIGNIFICANT CHANGE UP (ref 0–0)
PHOSPHATE SERPL-MCNC: 3.7 MG/DL — SIGNIFICANT CHANGE UP (ref 2.5–4.5)
PLATELET # BLD AUTO: 269 K/UL — SIGNIFICANT CHANGE UP (ref 150–400)
PLATELET # BLD AUTO: 293 K/UL — SIGNIFICANT CHANGE UP (ref 150–400)
PLATELET # BLD AUTO: 313 K/UL — SIGNIFICANT CHANGE UP (ref 150–400)
PLATELET # BLD AUTO: 315 K/UL — SIGNIFICANT CHANGE UP (ref 150–400)
POTASSIUM SERPL-MCNC: 4.3 MMOL/L — SIGNIFICANT CHANGE UP (ref 3.5–5.3)
POTASSIUM SERPL-SCNC: 4.3 MMOL/L — SIGNIFICANT CHANGE UP (ref 3.5–5.3)
PROT SERPL-MCNC: 5.5 G/DL — LOW (ref 6–8.3)
RBC # BLD: 2.27 M/UL — LOW (ref 4.2–5.8)
RBC # BLD: 2.51 M/UL — LOW (ref 4.2–5.8)
RBC # BLD: 2.62 M/UL — LOW (ref 4.2–5.8)
RBC # BLD: 2.64 M/UL — LOW (ref 4.2–5.8)
RBC # FLD: 14.5 % — SIGNIFICANT CHANGE UP (ref 10.3–14.5)
RBC # FLD: 14.5 % — SIGNIFICANT CHANGE UP (ref 10.3–14.5)
RBC # FLD: 14.6 % — HIGH (ref 10.3–14.5)
RBC # FLD: 14.6 % — HIGH (ref 10.3–14.5)
SODIUM SERPL-SCNC: 132 MMOL/L — LOW (ref 135–145)
WBC # BLD: 10.57 K/UL — HIGH (ref 3.8–10.5)
WBC # BLD: 10.58 K/UL — HIGH (ref 3.8–10.5)
WBC # BLD: 10.8 K/UL — HIGH (ref 3.8–10.5)
WBC # BLD: 9.8 K/UL — SIGNIFICANT CHANGE UP (ref 3.8–10.5)
WBC # FLD AUTO: 10.57 K/UL — HIGH (ref 3.8–10.5)
WBC # FLD AUTO: 10.58 K/UL — HIGH (ref 3.8–10.5)
WBC # FLD AUTO: 10.8 K/UL — HIGH (ref 3.8–10.5)
WBC # FLD AUTO: 9.8 K/UL — SIGNIFICANT CHANGE UP (ref 3.8–10.5)

## 2020-02-11 PROCEDURE — 99232 SBSQ HOSP IP/OBS MODERATE 35: CPT | Mod: GC

## 2020-02-11 PROCEDURE — 99233 SBSQ HOSP IP/OBS HIGH 50: CPT

## 2020-02-11 RX ORDER — I.V. FAT EMULSION 20 G/100ML
20.8 EMULSION INTRAVENOUS
Qty: 50 | Refills: 0 | Status: DISCONTINUED | OUTPATIENT
Start: 2020-02-11 | End: 2020-02-12

## 2020-02-11 RX ORDER — SOD SULF/SODIUM/NAHCO3/KCL/PEG
4000 SOLUTION, RECONSTITUTED, ORAL ORAL ONCE
Refills: 0 | Status: COMPLETED | OUTPATIENT
Start: 2020-02-11 | End: 2020-02-11

## 2020-02-11 RX ORDER — HYDROMORPHONE HYDROCHLORIDE 2 MG/ML
0.5 INJECTION INTRAMUSCULAR; INTRAVENOUS; SUBCUTANEOUS
Refills: 0 | Status: DISCONTINUED | OUTPATIENT
Start: 2020-02-11 | End: 2020-02-13

## 2020-02-11 RX ORDER — MAGNESIUM SULFATE 500 MG/ML
2 VIAL (ML) INJECTION ONCE
Refills: 0 | Status: COMPLETED | OUTPATIENT
Start: 2020-02-11 | End: 2020-02-11

## 2020-02-11 RX ORDER — ELECTROLYTE SOLUTION,INJ
1 VIAL (ML) INTRAVENOUS
Refills: 0 | Status: DISCONTINUED | OUTPATIENT
Start: 2020-02-11 | End: 2020-02-11

## 2020-02-11 RX ORDER — ONDANSETRON 8 MG/1
4 TABLET, FILM COATED ORAL ONCE
Refills: 0 | Status: COMPLETED | OUTPATIENT
Start: 2020-02-11 | End: 2020-02-11

## 2020-02-11 RX ADMIN — HYDROMORPHONE HYDROCHLORIDE 0.5 MILLIGRAM(S): 2 INJECTION INTRAMUSCULAR; INTRAVENOUS; SUBCUTANEOUS at 13:15

## 2020-02-11 RX ADMIN — HYDROMORPHONE HYDROCHLORIDE 0.5 MILLIGRAM(S): 2 INJECTION INTRAMUSCULAR; INTRAVENOUS; SUBCUTANEOUS at 13:00

## 2020-02-11 RX ADMIN — Medication 1 APPLICATION(S): at 18:00

## 2020-02-11 RX ADMIN — Medication 1 APPLICATION(S): at 06:05

## 2020-02-11 RX ADMIN — HYDROMORPHONE HYDROCHLORIDE 0.5 MILLIGRAM(S): 2 INJECTION INTRAMUSCULAR; INTRAVENOUS; SUBCUTANEOUS at 01:21

## 2020-02-11 RX ADMIN — Medication 0.5 MILLIGRAM(S): at 17:36

## 2020-02-11 RX ADMIN — Medication 600 MILLIGRAM(S): at 18:00

## 2020-02-11 RX ADMIN — HYDROMORPHONE HYDROCHLORIDE 0.5 MILLIGRAM(S): 2 INJECTION INTRAMUSCULAR; INTRAVENOUS; SUBCUTANEOUS at 01:36

## 2020-02-11 RX ADMIN — Medication 50 GRAM(S): at 03:07

## 2020-02-11 RX ADMIN — Medication 4000 MILLILITER(S): at 18:01

## 2020-02-11 RX ADMIN — ONDANSETRON 4 MILLIGRAM(S): 8 TABLET, FILM COATED ORAL at 21:38

## 2020-02-11 RX ADMIN — CHLORHEXIDINE GLUCONATE 1 APPLICATION(S): 213 SOLUTION TOPICAL at 06:04

## 2020-02-11 RX ADMIN — Medication 600 MILLIGRAM(S): at 06:05

## 2020-02-11 RX ADMIN — Medication 0.5 MILLIGRAM(S): at 06:42

## 2020-02-11 RX ADMIN — TAMSULOSIN HYDROCHLORIDE 0.4 MILLIGRAM(S): 0.4 CAPSULE ORAL at 21:14

## 2020-02-11 RX ADMIN — Medication 3 MILLILITER(S): at 17:36

## 2020-02-11 RX ADMIN — Medication 3 MILLILITER(S): at 06:43

## 2020-02-11 RX ADMIN — Medication 3 MILLILITER(S): at 11:19

## 2020-02-11 RX ADMIN — Medication 3 MILLILITER(S): at 00:14

## 2020-02-11 NOTE — PROGRESS NOTE ADULT - SUBJECTIVE AND OBJECTIVE BOX
Chief Complaint:  Patient is a 75y old  Male who presents with a chief complaint of abd. pain (2020 12:10)      Interval Events: Pt with 2 maroon BM yesterday. Denies abdominal pain    Allergies:  IV Contrast (Hives)      Hospital Medications:  albuterol/ipratropium for Nebulization. 3 milliLiter(s) Nebulizer every 6 hours  benzocaine 15 mG/menthol 3.6 mG (Sugar-Free) Lozenge 1 Lozenge Oral every 4 hours PRN  buDESOnide    Inhalation Suspension 0.5 milliGRAM(s) Inhalation every 12 hours  chlorhexidine 2% Cloths 1 Application(s) Topical <User Schedule>  ergocalciferol 49009 Unit(s) Oral every week  fat emulsion (Fish Oil and Plant Based) 20% Infusion 20.8 mL/Hr IV Continuous <Continuous>  guaiFENesin  milliGRAM(s) Oral every 12 hours  HYDROmorphone  Injectable 0.5 milliGRAM(s) IV Push every 3 hours PRN  nystatin/triamcinolone Cream 1 Application(s) Topical two times a day  pantoprazole Infusion 8 mG/Hr IV Continuous <Continuous>  Parenteral Nutrition - Adult 1 Each TPN Continuous <Continuous>  Parenteral Nutrition - Adult 1 Each TPN Continuous <Continuous>  tamsulosin 0.4 milliGRAM(s) Oral at bedtime  triamcinolone 0.1% Ointment 1 Application(s) Topical two times a day      PMHX/PSHX:  COPD with hypoxia  ETOHism  ETOH abuse  History of lumbosacral spine surgery  History of prostate surgery  History of appendectomy  S/P appendectomy      Family history:      ROS:     General:  No wt loss, fevers, chills, night sweats, fatigue,   Eyes:  Good vision, no reported pain  ENT:  No sore throat, pain, runny nose, dysphagia  CV:  No pain, palpitations, hypo/hypertension  Resp:  No dyspnea, cough, tachypnea, wheezing  GI:  See HPI  :  No pain, bleeding, incontinence, nocturia  Muscle:  No pain, weakness  Neuro:  No weakness, tingling, memory problems  Psych:  No fatigue, insomnia, mood problems, depression  Endocrine:  No polyuria, polydipsia, cold/heat intolerance  Heme:  No petechiae, ecchymosis, easy bruisability  Skin:  No rash, edema      PHYSICAL EXAM:     Vital Signs:  Vital Signs Last 24 Hrs  T(C): 37.3 (2020 13:00), Max: 37.8 (2020 03:00)  T(F): 99.1 (2020 13:00), Max: 100 (2020 03:00)  HR: 94 (2020 14:00) (81 - 106)  BP: 126/60 (2020 14:00) (93/50 - 164/70)  BP(mean): 87 (2020 14:00) (65 - 101)  RR: 22 (:00) (12 - 30)  SpO2: 94% (:00) (93% - 100%)  Daily Height in cm: 175 (2020 07:14)    Daily Weight in k.1 (2020 00:57)    GENERAL:  appears comfortable, no acute distress  HEENT:  NC/AT,  conjunctivae clear, sclera -anicteric  CHEST:  no increased effort  HEART:  Regular rate and rhythm  ABDOMEN:  Soft, non-tender, non-distended,  no masses ,no hepato-splenomegaly,   EXTREMITIES:  no cyanosis, clubbing or edema  SKIN:  No rash/erythema/ecchymoses/petechiae/wounds  NEURO:  Alert, oriented    LABS:                        7.8    10.80 )-----------( 315      ( 2020 14:09 )             24.8     02-11    132<L>  |  100  |  19  ----------------------------<  125<H>  4.3   |  23  |  0.40<L>    Ca    7.8<L>      2020 00:32  Phos  3.7     02-11  Mg     1.8     02-11    TPro  5.5<L>  /  Alb  2.2<L>  /  TBili  0.4  /  DBili  0.2  /  AST  37  /  ALT  63<H>  /  AlkPhos  99  02-11    LIVER FUNCTIONS - ( 2020 00:32 )  Alb: 2.2 g/dL / Pro: 5.5 g/dL / ALK PHOS: 99 U/L / ALT: 63 U/L / AST: 37 U/L / GGT: x                   Imaging:

## 2020-02-11 NOTE — PROGRESS NOTE ADULT - SUBJECTIVE AND OBJECTIVE BOX
Follow-up Pulm Progress Note    No new respiratory events overnight  SOB at baseline  Sats 95% RA    Medications:  MEDICATIONS  (STANDING):  albuterol/ipratropium for Nebulization. 3 milliLiter(s) Nebulizer every 6 hours  buDESOnide    Inhalation Suspension 0.5 milliGRAM(s) Inhalation every 12 hours  chlorhexidine 2% Cloths 1 Application(s) Topical <User Schedule>  ergocalciferol 71111 Unit(s) Oral every week  fat emulsion (Fish Oil and Plant Based) 20% Infusion 20.8 mL/Hr (20.8 mL/Hr) IV Continuous <Continuous>  guaiFENesin  milliGRAM(s) Oral every 12 hours  nystatin/triamcinolone Cream 1 Application(s) Topical two times a day  pantoprazole Infusion 8 mG/Hr (10 mL/Hr) IV Continuous <Continuous>  Parenteral Nutrition - Adult 1 Each (83 mL/Hr) TPN Continuous <Continuous>  Parenteral Nutrition - Adult 1 Each (83 mL/Hr) TPN Continuous <Continuous>  tamsulosin 0.4 milliGRAM(s) Oral at bedtime  triamcinolone 0.1% Ointment 1 Application(s) Topical two times a day    MEDICATIONS  (PRN):  benzocaine 15 mG/menthol 3.6 mG (Sugar-Free) Lozenge 1 Lozenge Oral every 4 hours PRN Sore Throat  HYDROmorphone  Injectable 0.5 milliGRAM(s) IV Push every 3 hours PRN Severe Pain (7 - 10)          Vital Signs Last 24 Hrs  T(C): 37.3 (11 Feb 2020 08:00), Max: 37.8 (11 Feb 2020 03:00)  T(F): 99.1 (11 Feb 2020 08:00), Max: 100 (11 Feb 2020 03:00)  HR: 91 (11 Feb 2020 11:21) (81 - 106)  BP: 149/64 (11 Feb 2020 11:00) (93/50 - 164/70)  BP(mean): 92 (11 Feb 2020 11:00) (65 - 101)  RR: 16 (11 Feb 2020 11:00) (12 - 30)  SpO2: 96% (11 Feb 2020 11:21) (93% - 100%)      VBG pH 7.37 02-11 @ 00:25    VBG pCO2 48 02-11 @ 00:25    VBG O2 sat 73 02-11 @ 00:25    VBG lactate 0.7 02-11 @ 00:25      02-10 @ 07:01  -  02-11 @ 07:00  --------------------------------------------------------  IN: 3565.5 mL / OUT: 1275 mL / NET: 2290.5 mL          LABS:                        7.9    10.58 )-----------( 293      ( 11 Feb 2020 08:27 )             24.7     02-11    132<L>  |  100  |  19  ----------------------------<  125<H>  4.3   |  23  |  0.40<L>    Ca    7.8<L>      11 Feb 2020 00:32  Phos  3.7     02-11  Mg     1.8     02-11    TPro  5.5<L>  /  Alb  2.2<L>  /  TBili  0.4  /  DBili  0.2  /  AST  37  /  ALT  63<H>  /  AlkPhos  99  02-11            CULTURES:       Culture - Urine (collected 02-07-20 @ 09:40)  Source: .Urine Clean Catch (Midstream)  Final Report (02-08-20 @ 08:39):    No growth      Physical Examination:  PULM: diminished BS throughout   CVS: RRR    RADIOLOGY REVIEWED  CXR 2/6 grossly clear, L costophrenic angle not fully visualized

## 2020-02-11 NOTE — PROGRESS NOTE ADULT - ASSESSMENT
74 y/o M presenting with septic shock and high grade SBO s/p exploratory laparotomy, lysis of adhesions, decompression of bowel via enterotomy w/ primary repair, and Abthera VAC placement on 12/6; s/p take down of Abthera, washout and re-application of the Abthera vac on 12/8. 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 s/p L chest tube placement by IR 12/30 for pleural effusion now s/p VATS, with chest tube insertion for drainage of pleural effusion. RRT called 1/13 AM for hypoxia, patient transferred back to SICU.  Patient continued SOB, chest drain possible obstruction expanding. Patent taken back to OR emergently 1/15 for clot evac and VATS. Patient is now s/p Ileostomy reversal 2/4/20.    PLAN:  - GI to do EGD today  - F/U H/H  - NPO  - Monitor BM frequency and appearance  - continue TPN, will calorie count once on regular diet  - OOB    [facility]  [id number] 74 y/o M presenting with septic shock and high grade SBO s/p exploratory laparotomy, lysis of adhesions, decompression of bowel via enterotomy w/ primary repair, and Abthera VAC placement on 12/6; s/p take down of Abthera, washout and re-application of the Abthera vac on 12/8. 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 s/p L chest tube placement by IR 12/30 for pleural effusion now s/p VATS, with chest tube insertion for drainage of pleural effusion. RRT called 1/13 AM for hypoxia, patient transferred back to SICU.  Patient continued SOB, chest drain possible obstruction expanding. Patent taken back to OR emergently 1/15 for clot evac and VATS. Patient is now s/p Ileostomy reversal 2/4/20.    PLAN:  - GI to do EGD/csope tomorrow  - F/U H/H  - NPO  - Monitor BM frequency and appearance  - continue TPN, will calorie count once on regular diet  - OOB    Red Surgery  [id number]

## 2020-02-11 NOTE — PROGRESS NOTE ADULT - SUBJECTIVE AND OBJECTIVE BOX
[facility], [place] NUTRITION SUPPORT / TPN -- FOLLOW UP NOTE  --------------------------------------------------------------------------------    24 hour events/subjective:  H/H dropped   BM slowing down- the one from this am less liquid        (+) blood in am stool  Pt on Protonix gtt  Pt for EGD  Pt given 1UpRBC overnight  Tolerating LRD but made NPO for testing  Pain appropriate - relief w/ pain Rx  Urinary retention improved w/ Cardura now on Flomax  No n/v  No cough/ cp/ palp/dyspnea/ sob  No f/c/s      Diet:  Diet, NPO:   Except Medications (02-10-20 @ 21:50)  Diet, NPO after Midnight:      NPO Start Date: 10-Feb-2020,   NPO Start Time: 23:59 (02-10-20 @ 21:50)      Appetite: [ x ]Poor [  ]Adequate [  ]Good  Caloric intake:  [   ]  Adequate   [   ] Inadequate    ROS: General/ GI see HPI  all other systems negative      ALLERGIES & MEDICATIONS  --------------------------------------------------------------------------------  ALLERGIES  IV Contrast (Hives)    STANDING INPATIENT MEDICATIONS    albuterol/ipratropium for Nebulization. 3 milliLiter(s) Nebulizer every 6 hours  buDESOnide    Inhalation Suspension 0.5 milliGRAM(s) Inhalation every 12 hours  chlorhexidine 2% Cloths 1 Application(s) Topical <User Schedule>  ergocalciferol 29261 Unit(s) Oral every week  fat emulsion (Fish Oil and Plant Based) 20% Infusion 20.8 mL/Hr IV Continuous <Continuous>  guaiFENesin  milliGRAM(s) Oral every 12 hours  nystatin/triamcinolone Cream 1 Application(s) Topical two times a day  pantoprazole Infusion 8 mG/Hr IV Continuous <Continuous>  Parenteral Nutrition - Adult 1 Each TPN Continuous <Continuous>  tamsulosin 0.4 milliGRAM(s) Oral at bedtime  triamcinolone 0.1% Ointment 1 Application(s) Topical two times a day      PRN INPATIENT MEDICATION  benzocaine 15 mG/menthol 3.6 mG (Sugar-Free) Lozenge 1 Lozenge Oral every 4 hours PRN  HYDROmorphone  Injectable 0.5 milliGRAM(s) IV Push every 3 hours PRN        VITALS/PHYSICAL EXAM  --------------------------------------------------------------------------------  T(C): 36.8 (02-11-20 @ 06:00), Max: 37.8 (02-11-20 @ 03:00)  HR: 96 (02-11-20 @ 07:14) (81 - 106)  BP: 125/62 (02-11-20 @ 07:14) (93/50 - 164/70)  RR: 27 (02-11-20 @ 07:14) (12 - 29)  SpO2: 96% (02-11-20 @ 07:14) (94% - 100%)  Wt(kg): --  Height (cm): 175 (02-11-20 @ 07:14)  Weight (kg): 54 (02-11-20 @ 07:14)  BMI (kg/m2): 17.6 (02-11-20 @ 07:14)  BSA (m2): 1.66 (02-11-20 @ 07:14)      02-10-20 @ 07:01  -  02-11-20 @ 07:00  --------------------------------------------------------  IN: 3565.5 mL / OUT: 1275 mL / NET: 2290.5 mL      PHYSICAL EXAM:  --------------------------------------------------------------------------------  	Gen: guarded but stable, A&Ox3, NC O2  	HEENT: NC/AT, PERRL, supple neck, trachea midline, mucosa moist              GI: (+) BS, softly distended, appropriately tender                    midline dressing c/d/i                    Rt sided  ostomy site dressing c/d/i              MSK: FROM x4, no contractures  	Vascular: Equally Warm,  no edema, no clubbing, cyanosis,                      RUE PICC w/o sx infection   	Neuro: No focal deficits, intact sensation, weakened strength BLE>BUE  	Psych: Normal affect and mood              skin: moist, erythematous pinpoint rash chest/ back, groin      LABS/ CULTURES/ RADIOLOGY:              7.9    10.58 >-----------<  293      [02-11-20 @ 08:27]              24.7     132  |  100  |  19  ----------------------------<  125      [02-11-20 @ 00:32]  4.3   |  23  |  0.40        Ca     7.8     [02-11-20 @ 00:32]      iCa    1.16     [02-11 @ 00:57]      Mg     1.8     [02-11-20 @ 00:32]      Phos  3.7     [02-11-20 @ 00:32]    TPro  5.5  /  Alb  2.2  /  TBili  0.4  /  DBili  0.2  /  AST  37  /  ALT  63  /  AlkPhos  99  [02-11-20 @ 00:32]      Blood Gas Calcium, Ionized - Venous: 1.16 mmoL/L (02-11-20 @ 00:25)      Prealbumin, Serum: 17 mg/dL (02-10-20 @ 09:15)  Prealbumin, Serum: 21 mg/dL (02-03-20 @ 23:50)  Prealbumin, Serum: 19 mg/dL (02-03-20 @ 07:18)  Prealbumin, Serum: 18 mg/dL (02-02-20 @ 09:48)  Prealbumin, Serum: 18 mg/dL (01-31-20 @ 07:40) [facility] NUTRITION SUPPORT / TPN -- FOLLOW UP NOTE  --------------------------------------------------------------------------------    24 hour events/subjective:  H/H dropped   BM slowing down- the one from this am less liquid        (+) blood in am stool  Pt on Protonix gtt  Pt for EGD/?colonoscopy  Pt given 1UpRBC overnight  Tolerating LRD but made NPO for testing  Pain appropriate - relief w/ pain Rx  Urinary retention improved w/ Cardura now on Flomax  No n/v  No cough/ cp/ palp/dyspnea/ sob  No f/c/s      Diet:  Diet, NPO:   Except Medications (02-10-20 @ 21:50)  Diet, NPO after Midnight:      NPO Start Date: 10-Feb-2020,   NPO Start Time: 23:59 (02-10-20 @ 21:50)      Appetite: [ x ]Poor [  ]Adequate [  ]Good  Caloric intake:  [   ]  Adequate   [   ] Inadequate    ROS: General/ GI see HPI  all other systems negative      ALLERGIES & MEDICATIONS  --------------------------------------------------------------------------------  ALLERGIES  IV Contrast (Hives)    STANDING INPATIENT MEDICATIONS    albuterol/ipratropium for Nebulization. 3 milliLiter(s) Nebulizer every 6 hours  buDESOnide    Inhalation Suspension 0.5 milliGRAM(s) Inhalation every 12 hours  chlorhexidine 2% Cloths 1 Application(s) Topical <User Schedule>  ergocalciferol 22583 Unit(s) Oral every week  fat emulsion (Fish Oil and Plant Based) 20% Infusion 20.8 mL/Hr IV Continuous <Continuous>  guaiFENesin  milliGRAM(s) Oral every 12 hours  nystatin/triamcinolone Cream 1 Application(s) Topical two times a day  pantoprazole Infusion 8 mG/Hr IV Continuous <Continuous>  Parenteral Nutrition - Adult 1 Each TPN Continuous <Continuous>  tamsulosin 0.4 milliGRAM(s) Oral at bedtime  triamcinolone 0.1% Ointment 1 Application(s) Topical two times a day      PRN INPATIENT MEDICATION  benzocaine 15 mG/menthol 3.6 mG (Sugar-Free) Lozenge 1 Lozenge Oral every 4 hours PRN  HYDROmorphone  Injectable 0.5 milliGRAM(s) IV Push every 3 hours PRN        VITALS/PHYSICAL EXAM  --------------------------------------------------------------------------------  T(C): 36.8 (02-11-20 @ 06:00), Max: 37.8 (02-11-20 @ 03:00)  HR: 96 (02-11-20 @ 07:14) (81 - 106)  BP: 125/62 (02-11-20 @ 07:14) (93/50 - 164/70)  RR: 27 (02-11-20 @ 07:14) (12 - 29)  SpO2: 96% (02-11-20 @ 07:14) (94% - 100%)  Wt(kg): --  Height (cm): 175 (02-11-20 @ 07:14)  Weight (kg): 54 (02-11-20 @ 07:14)  BMI (kg/m2): 17.6 (02-11-20 @ 07:14)  BSA (m2): 1.66 (02-11-20 @ 07:14)      02-10-20 @ 07:01  -  02-11-20 @ 07:00  --------------------------------------------------------  IN: 3565.5 mL / OUT: 1275 mL / NET: 2290.5 mL      PHYSICAL EXAM:  --------------------------------------------------------------------------------  	Gen: guarded but stable, A&Ox3, NC O2  	HEENT: NC/AT, PERRL, supple neck, trachea midline, mucosa moist              GI: (+) BS, softly distended, nontender                    midline dressing c/d/i                    Rt sided  ostomy site dressing c/d/i              MSK: FROM x4, no contractures  	Vascular: Equally Warm,  no edema, no clubbing, cyanosis,                      RUE PICC w/o sx infection   	Neuro: No focal deficits, intact sensation, weakened strength BLE>BUE  	Psych: Normal affect and mood              skin: moist, erythematous pinpoint rash chest/ back, groin      LABS/ CULTURES/ RADIOLOGY:              7.9    10.58 >-----------<  293      [02-11-20 @ 08:27]              24.7     132  |  100  |  19  ----------------------------<  125      [02-11-20 @ 00:32]  4.3   |  23  |  0.40        Ca     7.8     [02-11-20 @ 00:32]      iCa    1.16     [02-11 @ 00:57]      Mg     1.8     [02-11-20 @ 00:32]      Phos  3.7     [02-11-20 @ 00:32]    TPro  5.5  /  Alb  2.2  /  TBili  0.4  /  DBili  0.2  /  AST  37  /  ALT  63  /  AlkPhos  99  [02-11-20 @ 00:32]      Blood Gas Calcium, Ionized - Venous: 1.16 mmoL/L (02-11-20 @ 00:25)      Prealbumin, Serum: 17 mg/dL (02-10-20 @ 09:15)  Prealbumin, Serum: 21 mg/dL (02-03-20 @ 23:50)  Prealbumin, Serum: 19 mg/dL (02-03-20 @ 07:18)  Prealbumin, Serum: 18 mg/dL (02-02-20 @ 09:48)  Prealbumin, Serum: 18 mg/dL (01-31-20 @ 07:40)    Triglycerides, Serum: 59 mg/dL (02.10.20 @ 01:03)  Triglycerides, Serum: 59 mg/dL (02.03.20 @ 00:24)  Triglycerides, Serum: 61 mg/dL (02.02.20 @ 01:41)  Triglycerides, Serum: 51 mg/dL (01.31.20 @ 04:10)  Triglycerides, Serum: 83 mg/dL (01.27.20 @ 00:14)  Triglycerides, Serum: 50 mg/dL (01.07.20 @ 01:05)

## 2020-02-11 NOTE — PROGRESS NOTE ADULT - ASSESSMENT
73 y/o male presenting with high grade SBO s/p exploratory laparotomy, lysis of adhesions, decompression of bowel via enterotomy w/ primary repair, & Abthera VAC placement (12/06/2019); RTOR for re-exploration w/ Abthera VAC replacement (12/08/2019) to allow for demarcation of ischemic small bowel; RTOR for re-exploration, small bowel resection of 150 cm (150 cm remaining), ileocecetomy, end ileostomy, mucous fistula, and abdominal closure (12/10/2019); hospital course complicated by SVT, short bowel syndrome requiring repletions w/ IV fluids, malnutrition requiring TPN, intermittent episodes of hypotension, spontaneous left pneumothorax s/p pigtail catheter (12/15/2019-12/24/2019), left pleural effusion s/p pigtail catheter w/ IR (12/30/2019) & subsequent placement of Pleur-X catheter (1/10/2020) c/b left hemothorax s/p left VATS, dysphagia, and oral HSV lesions; now s/p ileostomy reversal    Neuro: acute post-op pain  - Monitor mental status  - Pain control as needed with IV acetaminophen and Dilaudid    Resp: COPD, spontaneous left pneumothorax s/p pigtail catheter, left pleural effusion s/p Pleur-X catheter c/b hemothorax s/p left VATS  - Monitor pulse oximeter  - Pulmicort and Duoneb for COPD  - To follow w/ pulmonologist Dr. Curry after discharge    CV: SVT  - Monitor vital signs    GI: s/p exploratory laparotomy, [place], decompression of bowel via enterotomy w/ primary repair, & Abthera VAC placement (12/06/2019); re-exploration w/ ABthera VAC replacement (12/08/2019); s/p re-exploration, small bowel resection of 150 cm (150 cm remaining), ileocecectomy end ileostomy, mucous fistula, & abdominal closure (12/10/2019); s/p ileostomy reversal (2/4/2020); short bowel syndrome, malnutrition, dysphagia  - tolerated LRD but made NPO in anticipation of EGD today  - Protonix gtt   - continued episodes of melena  - EGD w/ GI today    Renal: urinary retention, polyuria (resolved)  - Monitor I&Os  - Monitor electrolytes and replete as necessary  - voiding independently  - Doxazosin for previous episodes of urinary retention    Heme: anemia likely secondary to malnutrition / malabsorption  - Holding VTE prophylaxis in setting of melena and anemia requiring transfusion  - Overnight drop in H/h, 1u PRBC transfusion    ID: oral HSV lesions (s/p acyclovir 12/23/2019-01/02/2020), Pseudomonas PNA (s/p meropenem 1/19/2020-1/26/2020)  - Monitor for clinical evidence of active infection    Endo: no acute issues  - Monitor glucose q6hrs while on TPN    Disposition:  - Full code

## 2020-02-11 NOTE — PROGRESS NOTE ADULT - SUBJECTIVE AND OBJECTIVE BOX
SICU DAILY PROGRESS NOTE    75y Male  COPD and EtOH dependence who presented on 12/6/2019 with abdominal pain, nausea, hematemesis, and poor PO intake for ~2-3 days. In the ED, he was found to be hypotensive & tachycardic. Labs revealed an CHI and lactic acidosis. Imaging revealed a high grade SBO in the RLQ on CT scan. Hospital course is as follows:  12/06 - s/p exploratory laparotomy, lysis of adhesions, decompression of bowel via enterotomy w/ primary repair, and Abthera VAC placement as the distal 50% of the bowel appeared dusky but was still viable, admitted to SICU post-operatively as he was on vasopressor support and was left intubated  12/08 - s/p re-exploration, proximal 165 cm of small bowel appeared pink & viable but beyond that had patchy areas of ischemia so decision was made to give the bowel more time to demarcate before resecting in order to preserve as much small bowel as possible so Abthera VAC was replaced  12/09 - s/p re-exploration, small bowel resection of 150 cm (150 cm remaining), ileocecetomy, end ileostomy, mucous fistula, and abdominal closure  12/11 - extubated to BiPAP, noted to be in SVT that was rate controlled w/ metoprolol  12/14 - started on TPN  12/15 - noted to have spontaneous left pneumothorax s/p left pigtail catheter placement, noted to be in SVT again so amiodarone started, high ileostomy output noted so concern for short bowel syndrome  12/16 - amiodarone discontinued, started on beta blocker with metoprolol  12/18 - started Lomotil and ileostomy repletions with IV fluids  12/19 - started Imodium, left pigtail catheter was placed to water seal but pneumothorax noted on follow-up CXR so placed back to suction  12/20 - started tincture of opium, concern for aspiration so changed diet from regular to dysphagia 1 pureed with nectar-thickened liquids  12/22 - left pigtail catheter placed to water seal with no pneumothorax noted on follow-up CXR, CT chest with moderate left pleural effusion not being drained by pigtail catheter  12/23 - 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  12/24 - left pigtail catheter discontinued  12/28 - started Ancef for erythematous midline wound  12/29 - beta blocker discontinued for intermittent episodes of hypotension  12/30 - left pigtail catheter placement by IR with drainage of serous transudative fluid  01/02 - FEES demonstrating penetration with thin liquids so patient kept on dysphagia 1 pureed with nectar-thickened liquids  01/07 - transferred to floors  01/10 - s/p left VATS w/ PleurX catheter placement  01/11 - evaluated by speech & swallow, advanced diet to mechanical soft with thin liquids  01/12 - PleurX catheter placed to water seal with on pneumothorax on follow-up CXR  01/13 - RRT for hypoxemia, placed on BiPAP, PleurX catheter placed back to suction, started vancomycin & Zosyn for possible HCAP  01/14 - noted to have minimal output from PleurX catheter, lung ultrasound with large left pleural effusion concerning for hemothorax, multiple episodes of bradycardia secondary to hypoxemia, remains on BiPAP  01/15 - worsening respiratory distress requiring intubation, hypotensive requiring vasopressor support, CT chest with large left hemothorax w/ trace pneumothorax & extensive subcutaneous emphysema so taken to OR emergently for left VATS, evacuation fo 2 L of clot, and placement of two left chest tubes  01/16 - extubated, weaned off vasopressor support  01/17 - two left chest tubes placed to water seal with no pneumothorax noted on follow-up CXR  01/22 - inferior chest tube discontinued with no pneumothorax noted on follow-up CXR, polyuria noted  01/24 - given DDAVP for polyuria w/ good response  01/25 - superior chest tube discontinued with no pneumothorax noted on follow-up CXR  02/04 - s/p exploratory laparotomy, lysis of adhesions, and ileostomy reversal      24 HOUR EVENTS:  - advanced to low fiber diet, tolerating  - continued to have multiple episodes of melena  - overnight, w/ drop in H/h, 1u PRBC transfused.  - GI contacted, possible EGD tomorrow, made NPO    SUBJECTIVE/ROS:  [x] A ten-point review of systems was otherwise negative except as noted.  [ ] Due to altered mental status/intubation, subjective information were not able to be obtained from the patient. History was obtained, to the extent possible, from review of the chart and collateral sources of information.      NEURO  Exam: awake, alert, oriented  Meds: HYDROmorphone  Injectable 0.5 milliGRAM(s) IV Push every 3 hours PRN Severe Pain (7 - 10)    [x] Adequacy of sedation and pain control has been assessed and adjusted      RESPIRATORY  RR: 25 (02-11-20 @ 01:00) (16 - 29)  SpO2: 96% (02-11-20 @ 01:00) (94% - 100%)  Wt(kg): --  Exam: unlabored, clear to auscultation bilaterally  Mechanical Ventilation:     [N/A] Extubation Readiness Assessed  Meds: albuterol/ipratropium for Nebulization. 3 milliLiter(s) Nebulizer every 6 hours  buDESOnide    Inhalation Suspension 0.5 milliGRAM(s) Inhalation every 12 hours  guaiFENesin  milliGRAM(s) Oral every 12 hours        CARDIOVASCULAR  HR: 106 (02-11-20 @ 01:00) (80 - 106)  BP: 105/53 (02-11-20 @ 01:00) (94/52 - 164/70)  BP(mean): 75 (02-11-20 @ 01:00) (70 - 104)  ABP: --  ABP(mean): --  Wt(kg): --  CVP(cm H2O): --  VBG - ( 11 Feb 2020 00:25 )  pH: 7.37  /  pCO2: 48    /  pO2: 42    / HCO3: 27    / Base Excess: 1.9   /  SaO2: 73     Lactate: 0.7                Exam: regular rate and rhythm  Cardiac Rhythm: sinus  Perfusion     [x]Adequate   [ ]Inadequate  Mentation   [x]Normal       [ ]Reduced  Extremities  [x]Warm         [ ]Cool  Volume Status [ ]Hypervolemic [x]Euvolemic [ ]Hypovolemic  Meds: tamsulosin 0.4 milliGRAM(s) Oral at bedtime        GI/NUTRITION  Exam: soft, nontender, nondistended, incision C/D/I  Diet: NPO  Meds: pantoprazole Infusion 8 mG/Hr IV Continuous <Continuous>      GENITOURINARY  I&O's Detail    02-09 @ 07:01  -  02-10 @ 07:00  --------------------------------------------------------  IN:    fat emulsion (Fish Oil and Plant Based) 20% Infusion: 228.8 mL    Oral Fluid: 100 mL    pantoprazole Infusion: 210 mL    Solution: 50 mL    TPN (Total Parenteral Nutrition): 1743 mL  Total IN: 2331.8 mL    OUT:    Voided: 2025 mL  Total OUT: 2025 mL    Total NET: 306.8 mL      02-10 @ 07:01  -  02-11 @ 01:18  --------------------------------------------------------  IN:    fat emulsion (Fish Oil and Plant Based) 20% Infusion: 292.6 mL    Oral Fluid: 600 mL    pantoprazole Infusion: 190 mL    TPN (Total Parenteral Nutrition): 1577 mL  Total IN: 2659.6 mL    OUT:    Voided: 1275 mL  Total OUT: 1275 mL    Total NET: 1384.6 mL          02-11    132<L>  |  100  |  19  ----------------------------<  125<H>  4.3   |  23  |  0.40<L>    Ca    7.8<L>      11 Feb 2020 00:32  Phos  3.7     02-11  Mg     1.8     02-11    TPro  5.5<L>  /  Alb  2.2<L>  /  TBili  0.4  /  DBili  0.2  /  AST  37  /  ALT  63<H>  /  AlkPhos  99  02-11    [ ] Martinez catheter, indication: N/A  Meds: ergocalciferol 65757 Unit(s) Oral every week  fat emulsion (Fish Oil and Plant Based) 20% Infusion 20.8 mL/Hr IV Continuous <Continuous>  Parenteral Nutrition - Adult 1 Each TPN Continuous <Continuous>        HEMATOLOGIC  Meds:   [x] VTE Prophylaxis                        6.7    10.57 )-----------( 313      ( 11 Feb 2020 00:32 )             21.4       Transfusion     [ x] PRBC   [ ] Platelets   [ ] FFP   [ ] Cryoprecipitate      INFECTIOUS DISEASES  WBC Count: 10.57 K/uL (02-11 @ 00:32)  WBC Count: 11.72 K/uL (02-10 @ 20:31)  WBC Count: 9.27 K/uL (02-10 @ 12:09)  WBC Count: 9.17 K/uL (02-10 @ 06:21)    RECENT CULTURES:  Specimen Source: .Urine Clean Catch (Midstream)  Date/Time: 02-07 @ 09:40  Culture Results:   No growth  Gram Stain: --  Organism: --      ACCESS DEVICES:  [x ] Peripheral IV  [ ] Central Venous Line	[ ] R	[ ] L	[ ] IJ	[ ] Fem	[ ] SC	Placed:   [ ] Arterial Line		[ ] R	[ ] L	[ ] Fem	[ ] Rad	[ ] Ax	Placed:   [ ] PICC:					[ ] Mediport  [ ] Urinary Catheter, Date Placed:   [x] Necessity of urinary, arterial, and venous catheters discussed    OTHER MEDICATIONS:  benzocaine 15 mG/menthol 3.6 mG (Sugar-Free) Lozenge 1 Lozenge Oral every 4 hours PRN  chlorhexidine 2% Cloths 1 Application(s) Topical <User Schedule>  nystatin/triamcinolone Cream 1 Application(s) Topical two times a day  triamcinolone 0.1% Ointment 1 Application(s) Topical two times a day      CODE STATUS: full

## 2020-02-11 NOTE — PROGRESS NOTE ADULT - SUBJECTIVE AND OBJECTIVE BOX
Subjective: Patient seen and examined. No new events except as noted.   remains in ICU   - advanced to low fiber diet, tolerating  - continued to have multiple episodes of melena  - overnight, w/ drop in H/h, 1u PRBC transfused.  - For EGD today, made NPO      REVIEW OF SYSTEMS:    CONSTITUTIONAL:+ weakness, fevers or chills  EYES/ENT: No visual changes;  No vertigo or throat pain   NECK: No pain or stiffness  RESPIRATORY: No cough, wheezing, hemoptysis; No shortness of breath  CARDIOVASCULAR: No chest pain or palpitations  GASTROINTESTINAL: No abdominal or epigastric pain. No nausea, vomiting, or hematemesis; No diarrhea or constipation. No melena or hematochezia.  GENITOURINARY: No dysuria, frequency or hematuria  NEUROLOGICAL: No numbness or weakness  SKIN: No itching, burning, rashes, or lesions   All other review of systems is negative unless indicated above.    MEDICATIONS:  MEDICATIONS  (STANDING):  albuterol/ipratropium for Nebulization. 3 milliLiter(s) Nebulizer every 6 hours  buDESOnide    Inhalation Suspension 0.5 milliGRAM(s) Inhalation every 12 hours  chlorhexidine 2% Cloths 1 Application(s) Topical <User Schedule>  ergocalciferol 06360 Unit(s) Oral every week  fat emulsion (Fish Oil and Plant Based) 20% Infusion 20.8 mL/Hr (20.8 mL/Hr) IV Continuous <Continuous>  guaiFENesin  milliGRAM(s) Oral every 12 hours  nystatin/triamcinolone Cream 1 Application(s) Topical two times a day  pantoprazole Infusion 8 mG/Hr (10 mL/Hr) IV Continuous <Continuous>  Parenteral Nutrition - Adult 1 Each (83 mL/Hr) TPN Continuous <Continuous>  tamsulosin 0.4 milliGRAM(s) Oral at bedtime  triamcinolone 0.1% Ointment 1 Application(s) Topical two times a day      PHYSICAL EXAM:  T(C): 36.8 (02-11-20 @ 06:00), Max: 37.8 (02-11-20 @ 03:00)  HR: 96 (02-11-20 @ 07:14) (81 - 106)  BP: 125/62 (02-11-20 @ 07:14) (93/50 - 164/70)  RR: 27 (02-11-20 @ 07:14) (12 - 29)  SpO2: 96% (02-11-20 @ 07:14) (94% - 100%)  Wt(kg): --  I&O's Summary    10 Feb 2020 07:01  -  11 Feb 2020 07:00  --------------------------------------------------------  IN: 3565.5 mL / OUT: 1275 mL / NET: 2290.5 mL      Height (cm): 175 (02-11 @ 07:14)  Weight (kg): 54 (02-11 @ 07:14)  BMI (kg/m2): 17.6 (02-11 @ 07:14)  BSA (m2): 1.66 (02-11 @ 07:14)    Appearance: NAD  HEENT:   Dry oral mucosa, PERRL, EOMI	  Lymphatic: No lymphadenopathy , no edema  Cardiovascular: Normal S1 S2, No JVD, No murmurs , Peripheral pulses palpable 2+ bilaterally  Respiratory: Lungs clear to auscultation, normal effort 	  Gastrointestinal:  Soft, Non-tender, + BS	  Skin: No rashes, No ecchymoses, No cyanosis, warm to touch  Musculoskeletal: Normal range of motion, normal strength  Psychiatry:  Mood & affect appropriate  Ext: No edema      LABS:    CARDIAC MARKERS:                                7.9    10.58 )-----------( 293      ( 11 Feb 2020 08:27 )             24.7     02-11    132<L>  |  100  |  19  ----------------------------<  125<H>  4.3   |  23  |  0.40<L>    Ca    7.8<L>      11 Feb 2020 00:32  Phos  3.7     02-11  Mg     1.8     02-11    TPro  5.5<L>  /  Alb  2.2<L>  /  TBili  0.4  /  DBili  0.2  /  AST  37  /  ALT  63<H>  /  AlkPhos  99  02-11    proBNP:   Lipid Profile:   HgA1c:   TSH:             TELEMETRY: SR	    ECG:  	  RADIOLOGY:   DIAGNOSTIC TESTING:  [ ] Echocardiogram:  [ ]  Catheterization:  [ ] Stress Test:    OTHER:

## 2020-02-11 NOTE — CHART NOTE - NSCHARTNOTEFT_GEN_A_CORE
Nutrition Follow Up Note.    Patient seen for: malnutrition/TPN Team follow up    Source: patient, medical record, TPN Team rounds     Chart reviewed, events noted. Pt now S/P ileostomy reversal (2020); ongoing melena noted.    Nutrition Status: Severe Malnutrition. Pt with 150 cm small bowel remaining after GI surgery x 3. TPN initiated . Pt has been tolerating a po diet since . TPN remains at full goal post-op. Diet advanced to Low Fiber (2/10); pt tolerated a few bites of solid food. Pt now NPO for possible EGD in setting of ongoing melena.    TPN Adjustments: [TPN Team rounds pending]    Diet (2/10): NPO after midnight    Parenteral Nutrition: (2/10): 2L infusing at 83ml/hr (120Gm amino acids, 210Gm dextrose, 50Gm SMOF lipids); to provide: 1694cal (31Kcal/Kg and 2.2Gm protein/Kg dosing wt 54.2Kg); with 10ml MVI and 3ml MTE-5.     Non-Protein Calories: 1214 gregorio/day (22 gregorio/Kg)  Dextrose Infusion Rate: 2.7 mg/Kg/min  Lipid Infusion Rate: 0.92 Gm/Kg/day; 0.08 Gm/Kg/hr    Last BM:  (x 4), 2/10 (x 6)    Urine output x 24-hours: 1275ml    Daily Weight in k.1 (-), Weight in k.5 (02-10), Weight in k.8 (), Weight in k.8 (), Weight in k.7 (-), Weight in k.5 (-), Weight in k.5 (-); weight change likely reflects both fluid shifts and increased lean body mass with improved nutrition    Drug Dosing Weight  Weight (kg): 54 (2020 07:14)  BMI (kg/m2): 17.6 (2020 07:14)    Pertinent Medications: MEDICATIONS  (STANDING):  albuterol/ipratropium for Nebulization. 3 milliLiter(s) Nebulizer every 6 hours  buDESOnide    Inhalation Suspension 0.5 milliGRAM(s) Inhalation every 12 hours  chlorhexidine 2% Cloths 1 Application(s) Topical <User Schedule>  ergocalciferol 86970 Unit(s) Oral every week  fat emulsion (Fish Oil and Plant Based) 20% Infusion 20.8 mL/Hr (20.8 mL/Hr) IV Continuous <Continuous>  guaiFENesin  milliGRAM(s) Oral every 12 hours  nystatin/triamcinolone Cream 1 Application(s) Topical two times a day  pantoprazole Infusion 8 mG/Hr (10 mL/Hr) IV Continuous <Continuous>  Parenteral Nutrition - Adult 1 Each (83 mL/Hr) TPN Continuous <Continuous>  tamsulosin 0.4 milliGRAM(s) Oral at bedtime  triamcinolone 0.1% Ointment 1 Application(s) Topical two times a day    MEDICATIONS  (PRN):  benzocaine 15 mG/menthol 3.6 mG (Sugar-Free) Lozenge 1 Lozenge Oral every 4 hours PRN Sore Throat  HYDROmorphone  Injectable 0.5 milliGRAM(s) IV Push every 3 hours PRN Severe Pain (7 - 10)    LABS:    @ 04:09:  Hemoglobin 7.5<L>, Hematocrit 23.8<L>   @ 00:32: Sodium 132<L>, Potassium 4.3, Chloride 100, Calcium 7.8<L>, Magnesium 1.8, Phosphorus 3.7, BUN 19, Creatinine 0.40<L>, <H>, Alk Phos 99, ALT/SGPT 63<H>, AST/SGOT 37,  Total Protein 5.5<L>, Albumin 2.2<L>, Total Bilirubin 0.4, Direct Bilirubin 0.2, Hemoglobin 6.7<LL>, Hematocrit 21.4<L>    Skin per nursing documentation: no pressure injuries documented  Edema: none noted    Estimated Needs: based on dosing wt 54.2Kg, with consideration for TPN, malnutrition  9085-2404 gregorio/day (25-30cal/Kg)   Gm protein/day (1.8-2.2Gm/Kg)     Previous Nutrition Diagnosis: Severe malnutrition  Nutrition Diagnosis is: ongoing, being addressed with TPN at full goal post-op, diet advancement as medically feasible    New Nutrition Diagnosis: none     Interventions: Continue TPN at full goal; monitor GI function, ability to resume po diet    Recommend  1) TPN per TPN Team/Nutrition assessment; continue at full goal post-op  2) Monitor GI function; resume Low Fiber diet as tolerated  3) When diet is resumed, add 3 servings apple Ensure Clear (240cal, 8Gm protein per serving)  4) When diet is resumed, add 3 servings chocolate Ensure Enlive (350cal, 20Gm protein per 8oz serving)   5) When diet is resumed, add 1 Prosource (60cal, 15Gm protein)  6) When diet is resumed, add chocolate Mighty Shake supplement (200 calories, 6 Gm protein) tid    Monitoring and Evaluation:     Continue to monitor nutrition provision and tolerance, weights, labs, skin integrity.    RD remains available upon request and will follow up per protocol.    Sowmya Graham MS RD CDN [facility], Pager # [phone]. Nutrition Follow Up Note.    Patient seen for: malnutrition/TPN Team follow up    Source: patient, medical record, TPN Team rounds     Chart reviewed, events noted. Pt now S/P ileostomy reversal (2020); drop in Hct and ongoing melena noted.    Nutrition Status: Severe Malnutrition. Pt with 150 cm small bowel remaining after GI surgery x 3. TPN initiated . Pt has been tolerating a po diet since . TPN remains at full goal post-op. Diet advanced to Low Fiber (2/10); pt tolerated a few bites of solid food. Pt now NPO for EGD.    TPN Adjustments: no new changes    Diet (2/10): NPO after midnight    Parenteral Nutrition: (2/10): 2L infusing at 83ml/hr (120Gm amino acids, 210Gm dextrose, 50Gm SMOF lipids); to provide: 1694cal (31Kcal/Kg and 2.2Gm protein/Kg dosing wt 54.2Kg); with 10ml MVI and 3ml MTE-5.     Non-Protein Calories: 1214 gregorio/day (22 gregorio/Kg)  Dextrose Infusion Rate: 2.7 mg/Kg/min  Lipid Infusion Rate: 0.92 Gm/Kg/day; 0.08 Gm/Kg/hr    Last BM:  (x 4), 2/10 (x 6)    Urine output x 24-hours: 1275ml    Daily Weight in k.1 (), Weight in k.5 (02-10), Weight in k.8 (), Weight in k.8 (), Weight in k.7 (), Weight in k.5 (-), Weight in k.5 (); weight change likely reflects both fluid shifts and increased lean body mass with improved nutrition    Drug Dosing Weight  Weight (kg): 54 (2020 07:14)  BMI (kg/m2): 17.6 (2020 07:14)    Pertinent Medications: MEDICATIONS  (STANDING):  albuterol/ipratropium for Nebulization. 3 milliLiter(s) Nebulizer every 6 hours  buDESOnide    Inhalation Suspension 0.5 milliGRAM(s) Inhalation every 12 hours  chlorhexidine 2% Cloths 1 Application(s) Topical <User Schedule>  ergocalciferol 18180 Unit(s) Oral every week  fat emulsion (Fish Oil and Plant Based) 20% Infusion 20.8 mL/Hr (20.8 mL/Hr) IV Continuous <Continuous>  guaiFENesin  milliGRAM(s) Oral every 12 hours  nystatin/triamcinolone Cream 1 Application(s) Topical two times a day  pantoprazole Infusion 8 mG/Hr (10 mL/Hr) IV Continuous <Continuous>  Parenteral Nutrition - Adult 1 Each (83 mL/Hr) TPN Continuous <Continuous>  tamsulosin 0.4 milliGRAM(s) Oral at bedtime  triamcinolone 0.1% Ointment 1 Application(s) Topical two times a day    MEDICATIONS  (PRN):  benzocaine 15 mG/menthol 3.6 mG (Sugar-Free) Lozenge 1 Lozenge Oral every 4 hours PRN Sore Throat  HYDROmorphone  Injectable 0.5 milliGRAM(s) IV Push every 3 hours PRN Severe Pain (7 - 10)    LABS:    @ 04:09:  Hemoglobin 7.5<L>, Hematocrit 23.8<L>   @ 00:32: Sodium 132<L>, Potassium 4.3, Chloride 100, Calcium 7.8<L>, Magnesium 1.8, Phosphorus 3.7, BUN 19, Creatinine 0.40<L>, <H>, Alk Phos 99, ALT/SGPT 63<H>, AST/SGOT 37,  Total Protein 5.5<L>, Albumin 2.2<L>, Total Bilirubin 0.4, Direct Bilirubin 0.2, Hemoglobin 6.7<LL>, Hematocrit 21.4<L>    Skin per nursing documentation: no pressure injuries documented  Edema: none noted    Estimated Needs: based on dosing wt 54.2Kg, with consideration for TPN, malnutrition  6862-6039 gregorio/day (25-30cal/Kg)   Gm protein/day (1.8-2.2Gm/Kg)     Previous Nutrition Diagnosis: Severe malnutrition  Nutrition Diagnosis is: ongoing, being addressed with TPN at full goal post-op, diet advancement as medically feasible    New Nutrition Diagnosis: none     Interventions: Continue TPN at full goal; monitor GI function, ability to resume po diet    Recommend  1) TPN per TPN Team/Nutrition assessment; continue at full goal post-op  2) Monitor GI function; resume Low Fiber diet as tolerated  3) When diet is resumed, add 3 servings apple Ensure Clear (240cal, 8Gm protein per serving)  4) When diet is resumed, add 3 servings chocolate Ensure Enlive (350cal, 20Gm protein per 8oz serving)   5) When diet is resumed, add 1 Prosource (60cal, 15Gm protein)  6) When diet is resumed, add chocolate Mighty Shake supplement (200 calories, 6 Gm protein) tid    Monitoring and Evaluation:     Continue to monitor nutrition provision and tolerance, weights, labs, skin integrity.    RD remains available upon request and will follow up per protocol.    Sowmya Graham MS RD CDN [facility] ([facility]), Pager # [phone]. Nutrition Follow Up Note.    Patient seen for: malnutrition/TPN Team follow up    Source: patient, medical record, TPN Team rounds     Chart reviewed, events noted. Pt now S/P ileostomy reversal (2020); drop in Hct and ongoing melena noted. Plan for possible EGD/colonoscopy today.    Nutrition Status: Severe Malnutrition. Pt with 150 cm small bowel remaining after GI surgery x 3. TPN initiated . Pt has been tolerating a po diet since . TPN remains at full goal post-op. Diet advanced to Low Fiber (2/10); pt tolerated a few bites of solid food. Pt now NPO for procedure.    TPN Adjustments: no new changes    Diet (2/10): NPO after midnight    Parenteral Nutrition: (2/10): 2L infusing at 83ml/hr (120Gm amino acids, 210Gm dextrose, 50Gm SMOF lipids); to provide: 1694cal (31Kcal/Kg and 2.2Gm protein/Kg dosing wt 54.2Kg); with 10ml MVI and 3ml MTE-5.     Non-Protein Calories: 1214 gregorio/day (22 gregorio/Kg)  Dextrose Infusion Rate: 2.7 mg/Kg/min  Lipid Infusion Rate: 0.92 Gm/Kg/day; 0.08 Gm/Kg/hr    Last BM:  (x 4), 2/10 (x 6)    Urine output x 24-hours: 1275ml    Daily Weight in k.1 (), Weight in k.5 (02-10), Weight in k.8 (), Weight in k.8 (), Weight in k.7 (), Weight in k.5 (-), Weight in k.5 (-); weight change likely reflects both fluid shifts and increased lean body mass with improved nutrition    Drug Dosing Weight  Weight (kg): 54 (2020 07:14)  BMI (kg/m2): 17.6 (2020 07:14)    Pertinent Medications: MEDICATIONS  (STANDING):  albuterol/ipratropium for Nebulization. 3 milliLiter(s) Nebulizer every 6 hours  buDESOnide    Inhalation Suspension 0.5 milliGRAM(s) Inhalation every 12 hours  chlorhexidine 2% Cloths 1 Application(s) Topical <User Schedule>  ergocalciferol 88865 Unit(s) Oral every week  fat emulsion (Fish Oil and Plant Based) 20% Infusion 20.8 mL/Hr (20.8 mL/Hr) IV Continuous <Continuous>  guaiFENesin  milliGRAM(s) Oral every 12 hours  nystatin/triamcinolone Cream 1 Application(s) Topical two times a day  pantoprazole Infusion 8 mG/Hr (10 mL/Hr) IV Continuous <Continuous>  Parenteral Nutrition - Adult 1 Each (83 mL/Hr) TPN Continuous <Continuous>  tamsulosin 0.4 milliGRAM(s) Oral at bedtime  triamcinolone 0.1% Ointment 1 Application(s) Topical two times a day    MEDICATIONS  (PRN):  benzocaine 15 mG/menthol 3.6 mG (Sugar-Free) Lozenge 1 Lozenge Oral every 4 hours PRN Sore Throat  HYDROmorphone  Injectable 0.5 milliGRAM(s) IV Push every 3 hours PRN Severe Pain (7 - 10)    LABS:    @ 04:09:  Hemoglobin 7.5<L>, Hematocrit 23.8<L>   @ 00:32: Sodium 132<L>, Potassium 4.3, Chloride 100, Calcium 7.8<L>, Magnesium 1.8, Phosphorus 3.7, BUN 19, Creatinine 0.40<L>, <H>, Alk Phos 99, ALT/SGPT 63<H>, AST/SGOT 37,  Total Protein 5.5<L>, Albumin 2.2<L>, Total Bilirubin 0.4, Direct Bilirubin 0.2, Hemoglobin 6.7<LL>, Hematocrit 21.4<L>    Skin per nursing documentation: no pressure injuries documented  Edema: none noted    Estimated Needs: based on dosing wt 54.2Kg, with consideration for TPN, malnutrition  6944-9386 gregorio/day (25-30cal/Kg)   Gm protein/day (1.8-2.2Gm/Kg)     Previous Nutrition Diagnosis: Severe malnutrition  Nutrition Diagnosis is: ongoing, being addressed with TPN at full goal post-op, diet advancement as medically feasible    New Nutrition Diagnosis: none     Interventions: Continue TPN at full goal; monitor GI function, ability to resume po diet    Recommend  1) TPN per TPN Team/Nutrition assessment; continue at full goal post-op  2) Monitor GI function; resume Low Fiber diet as tolerated  3) When diet is resumed, add 3 servings apple Ensure Clear (240cal, 8Gm protein per serving)  4) When diet is resumed, add 3 servings chocolate Ensure Enlive (350cal, 20Gm protein per 8oz serving)   5) When diet is resumed, add 1 Prosource (60cal, 15Gm protein)  6) When diet is resumed, add chocolate Mighty Shake supplement (200 calories, 6 Gm protein) tid    Monitoring and Evaluation:     Continue to monitor nutrition provision and tolerance, weights, labs, skin integrity.    RD remains available upon request and will follow up per protocol.    Sowmya Graham MS RD CDN [facility], Pager # [phone].

## 2020-02-11 NOTE — PROGRESS NOTE ADULT - ASSESSMENT
Impression:  # Maroon Stool: Required 1u overnight. BUN/Cr stable, hgb now relatively stable. Suspect most likely source of bleeding from surgical site. Remains hemodynamically stable now  # SBO high grade s/p multiple surgeries  # Shock: resolved    Recommendation:  - continue with IV PPI  - CLD diet today and NPO at midnight  - plan for EGD/colonoscopy tmro  - GI fellow to order bowel prep  - trend CBC, CMP, INR  - 2 large bore IVs; active type and screen  - transfuse Hgb > 7, Platelets > 50  - supportive care as per primary team

## 2020-02-11 NOTE — PROGRESS NOTE ADULT - ASSESSMENT
A/P: 75 year old male w/ PMH of COPD and EtOH dependence with PSH/o appy, prostate surgery, & spine surgery  septic shock and high grade SBO s/p exploratory laparotomy, lysis of adhesions, decompression of bowel via enterotomy w/ primary repair, and Abthera VAC placement on 12/6; s/p take down of Abthera, washout and re-application of the Abthera vac on 12/8. Now s/p SBR and end ileostomy on 12/10.   TPN consulted to assist w/ management of pt's nutrition in pt w/ prolonged hospital course now tolerating diet but has high Ileostomy output.  Pt s/p Lt Chest Pigtail for effusion in IR with persistent high output on 1/10/2020 pt had VATs & placement of Left PleurX catheter. Pt op pt developed hemothorax and Respiratory Distress.  Pt is s/p Lt chest Evacuation of 2L blood w/ placement of CT x2 on 1/15/2020 for Lt Pleural Effusion that's resolving and doing well after CT removed and resolved Rt PNA vs Pleural Effusion.   Pt s/p Ex Lap, MIRYAM, & Closure of End Ileostomy on 2/4/2020.    Pt's diet advanced to LRD this am  Pt w/ improving severe Protein-Calorie Malnutrition-       Will continue to monitor for improvement of Short Gut Syndrome w/Malabsorption- post op  TPN at GOAL:  Amino Acids 120g, Dextrose 210g, and Lipids 50g in 2000mL with 3mL MTE & 10mL MVI          -GIB -serial H&H/ exams,  another 1U pRBC given         Protonix gtt         Pt for EGD and possible Colonoscopy by GI  -HypoCa- improving w/ increased Ca in TPN to 14mEq- continue to monitor        Improved Ca levels helpful for BP & muscle contraction  -LowK - improving, will continue w/ 80mEq KCl in TPN        maintaining K & Mg will help with GI motility   -LowMg- improving with increased Mg to 16mEq         will monitor as pt on Protonix gtt  -HypoPhos- improving w/-increased NaPhos & will continue to monitor   -Fecal fat testing suggestive of decreased absorption of fat -        will monitor for improved absorption with improving GI function        TPN w/ MVI to compensate for low Vit E & A        Vit D levels low- being supplemented w/Ergocalciferol         Vit K INR/ PT near normal suggestive that it is being absorbed   -Edema resolved and Fluid overload resolved -continue to monitor        Strict Intake and Output -   -Good glycemic control-  Fingersticks & ISS coverage - as per SICU  -Weights three times a week  -Daily CMP, Mg, Ionized Ca, Phosphorus,        and Weekly Triglycerides and Pre-albumin  Continue as per SICU/Surgery, will follow with you, D/w primary team    Andreina Hubbard PA-C  TPN team, pager [phone]  D/w Ana M Chacko & MU Siegel A/P: 75 year old male w/ PMH of COPD and EtOH dependence with PSH/o appy, prostate surgery, & spine surgery  septic shock and high grade SBO s/p exploratory laparotomy, lysis of adhesions, decompression of bowel via enterotomy w/ primary repair, and Abthera VAC placement on 12/6; s/p take down of Abthera, washout and re-application of the Abthera vac on 12/8. Now s/p SBR and end ileostomy on 12/10.   TPN consulted to assist w/ management of pt's nutrition in pt w/ prolonged hospital course now tolerating diet but has high Ileostomy output.  Pt s/p Lt Chest Pigtail for effusion in IR with persistent high output on 1/10/2020 pt had VATs & placement of Left PleurX catheter. Pt op pt developed hemothorax and Respiratory Distress.  Pt is s/p Lt chest Evacuation of 2L blood w/ placement of CT x2 on 1/15/2020 for Lt Pleural Effusion that's resolving and doing well after CT removed and resolved Rt PNA vs Pleural Effusion.   Pt s/p Ex Lap, MIRYAM, & Closure of End Ileostomy on 2/4/2020.    Pt's made NPO for testing  Pt w/ improving severe Protein-Calorie Malnutrition-       Will continue to monitor for improvement of Short Gut Syndrome w/Malabsorption- post op  TPN at GOAL:  Amino Acids 120g, Dextrose 210g, and Lipids 50g in 2000mL with 3mL MTE & 10mL MVI          -GIB -serial H&H/ exams,  another 1U pRBC given         Protonix gtt         Pt for EGD and possible Colonoscopy by GI  -HypoCa- improving w/ increased Ca in TPN to 14mEq- continue to monitor        Improved Ca levels helpful for BP & muscle contraction  -LowK - improving, will continue w/ 80mEq KCl in TPN        maintaining K & Mg will help with GI motility   -LowMg- improving with increased Mg to 16mEq         will monitor as pt on Protonix gtt  -HypoPhos- improving w/-increased NaPhos & will continue to monitor   -Fecal fat testing suggestive of decreased absorption of fat -        will monitor for improved absorption with improving GI function        TPN w/ MVI to compensate for low Vit E & A        Vit D levels low- being supplemented w/Ergocalciferol         Vit K INR/ PT near normal suggestive that it is being absorbed   -Edema resolved and Fluid overload resolved -continue to monitor        Strict Intake and Output -   -Good glycemic control-  Fingersticks & ISS coverage - as per SICU  -Weights three times a week  -Daily CMP, Mg, Ionized Ca, Phosphorus,        and Weekly Triglycerides and Pre-albumin  Continue as per SICU/Surgery, will follow with you, D/w primary team    Andreina Hubbard PA-C  TPN team, pager [phone]  D/w Ana M Chacko & MU Siegel

## 2020-02-11 NOTE — PROGRESS NOTE ADULT - PROBLEM SELECTOR PLAN 2
s/p L VATS with partial decortication  -Last CXR 2/6 grossly clear, L costophrenic angle not fully visualized

## 2020-02-11 NOTE — PROGRESS NOTE ADULT - ASSESSMENT
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 Complicated hospital course including spontaneous L PTX (12/15) likely 2nd bleb rupture with CT placement. Eventual L VATS with Pleurex placement (1/10) then re-op L VATS with partial decortication (1/15) and evacuation of hemothorax. Then s/p ex-lap, MIRYAM and ileostomy reversal 2/4. Now with GIB

## 2020-02-11 NOTE — PROGRESS NOTE ADULT - SUBJECTIVE AND OBJECTIVE BOX
Surgery Progress Note    SUBJECTIVE/24 HOUR EVENTS:  - advanced to low fiber diet, tolerating  - continued to have multiple episodes of melena  - overnight, w/ drop in H/h, 1u PRBC transfused.  - GI contacted, possible EGD today, made NPO  - Patient states feeling well, continues with dark colored stools  - Denies abdominal pain, N/V, Chest pain, SOB  - Tolerating CLD and voiding spontaneously  --------------------------------------------------------------------------------------------------  OBJECTIVE:   Physical Exam:  General: AAOx3, NAD, lying comfortably in bed  HEENT: NC/AT  Respiratory: nonlabored breathing  Cardiovascular: RRR, normal S1 and S2, no murmurs or gallops  Abdomen: non-distended, soft, appropriately tender, abdominal dressings clean and dry.  Extremities: WWP, no edema  --------------------------------------------------------------------------------------------------  V/S:  Vital Signs Last 24 Hrs  T(C): 36.8 (11 Feb 2020 06:00), Max: 37.8 (11 Feb 2020 03:00)  T(F): 98.2 (11 Feb 2020 06:00), Max: 100 (11 Feb 2020 03:00)  HR: 96 (11 Feb 2020 07:14) (81 - 106)  BP: 125/62 (11 Feb 2020 07:14) (93/50 - 164/70)  BP(mean): 87 (11 Feb 2020 07:00) (65 - 104)  RR: 27 (11 Feb 2020 07:14) (12 - 29)  SpO2: 96% (11 Feb 2020 07:14) (94% - 100%)    --------------------------------------------------------------------------------------------------  I/Os:    10 Feb 2020 07:01  -  11 Feb 2020 07:00  --------------------------------------------------------  IN:    fat emulsion (Fish Oil and Plant Based) 20% Infusion: 333.5 mL    Oral Fluid: 600 mL    Packed Red Blood Cells: 350 mL    pantoprazole Infusion: 240 mL    Solution: 50 mL    TPN (Total Parenteral Nutrition): 1992 mL  Total IN: 3565.5 mL    OUT:    Voided: 1275 mL  Total OUT: 1275 mL    Total NET: 2290.5 mL        --------------------------------------------------------------------------------------------------  LABS:                        7.9    10.58 )-----------( 293      ( 11 Feb 2020 08:27 )             24.7     11 Feb 2020 00:32    132    |  100    |  19     ----------------------------<  125    4.3     |  23     |  0.40     Ca    7.8        11 Feb 2020 00:32  Phos  3.7       11 Feb 2020 00:32  Mg     1.8       11 Feb 2020 00:32    TPro  5.5    /  Alb  2.2    /  TBili  0.4    /  DBili  0.2    /  AST  37     /  ALT  63     /  AlkPhos  99     11 Feb 2020 00:32      CAPILLARY BLOOD GLUCOSE            LIVER FUNCTIONS - ( 11 Feb 2020 00:32 )  Alb: 2.2 g/dL / Pro: 5.5 g/dL / ALK PHOS: 99 U/L / ALT: 63 U/L / AST: 37 U/L / GGT: x               --------------------------------------------------------------------------------------------------  MEDICATIONS  (STANDING):  albuterol/ipratropium for Nebulization. 3 milliLiter(s) Nebulizer every 6 hours  buDESOnide    Inhalation Suspension 0.5 milliGRAM(s) Inhalation every 12 hours  chlorhexidine 2% Cloths 1 Application(s) Topical <User Schedule>  ergocalciferol 13517 Unit(s) Oral every week  fat emulsion (Fish Oil and Plant Based) 20% Infusion 20.8 mL/Hr (20.8 mL/Hr) IV Continuous <Continuous>  guaiFENesin  milliGRAM(s) Oral every 12 hours  nystatin/triamcinolone Cream 1 Application(s) Topical two times a day  pantoprazole Infusion 8 mG/Hr (10 mL/Hr) IV Continuous <Continuous>  Parenteral Nutrition - Adult 1 Each (83 mL/Hr) TPN Continuous <Continuous>  tamsulosin 0.4 milliGRAM(s) Oral at bedtime  triamcinolone 0.1% Ointment 1 Application(s) Topical two times a day    MEDICATIONS  (PRN):  benzocaine 15 mG/menthol 3.6 mG (Sugar-Free) Lozenge 1 Lozenge Oral every 4 hours PRN Sore Throat  HYDROmorphone  Injectable 0.5 milliGRAM(s) IV Push every 3 hours PRN Severe Pain (7 - 10)

## 2020-02-11 NOTE — PROGRESS NOTE ADULT - PROBLEM SELECTOR PLAN 5
s/p repeat  PRBC transfusion   PPI gtt   For EGD today   Acceptable cardiac risk to proceed   GI following

## 2020-02-12 ENCOUNTER — RESULT REVIEW (OUTPATIENT)
Age: 75
End: 2020-02-12

## 2020-02-12 LAB
ALBUMIN SERPL ELPH-MCNC: 2.6 G/DL — LOW (ref 3.3–5)
ALP SERPL-CCNC: 110 U/L — SIGNIFICANT CHANGE UP (ref 40–120)
ALT FLD-CCNC: 82 U/L — HIGH (ref 10–45)
ANION GAP SERPL CALC-SCNC: 9 MMOL/L — SIGNIFICANT CHANGE UP (ref 5–17)
APTT BLD: 36.4 SEC — HIGH (ref 27.5–36.3)
AST SERPL-CCNC: 44 U/L — HIGH (ref 10–40)
BILIRUB DIRECT SERPL-MCNC: 0.2 MG/DL — SIGNIFICANT CHANGE UP (ref 0–0.2)
BILIRUB INDIRECT FLD-MCNC: 0.4 MG/DL — SIGNIFICANT CHANGE UP (ref 0.2–1)
BILIRUB SERPL-MCNC: 0.6 MG/DL — SIGNIFICANT CHANGE UP (ref 0.2–1.2)
BUN SERPL-MCNC: 16 MG/DL — SIGNIFICANT CHANGE UP (ref 7–23)
CA-I BLD-SCNC: 1.12 MMOL/L — SIGNIFICANT CHANGE UP (ref 1.12–1.3)
CALCIUM SERPL-MCNC: 8.2 MG/DL — LOW (ref 8.4–10.5)
CHLORIDE SERPL-SCNC: 99 MMOL/L — SIGNIFICANT CHANGE UP (ref 96–108)
CO2 SERPL-SCNC: 23 MMOL/L — SIGNIFICANT CHANGE UP (ref 22–31)
CREAT SERPL-MCNC: 0.38 MG/DL — LOW (ref 0.5–1.3)
CULTURE RESULTS: SIGNIFICANT CHANGE UP
GLUCOSE SERPL-MCNC: 107 MG/DL — HIGH (ref 70–99)
HCT VFR BLD CALC: 24.6 % — LOW (ref 39–50)
HCT VFR BLD CALC: 26 % — LOW (ref 39–50)
HCT VFR BLD CALC: 27.4 % — LOW (ref 39–50)
HGB BLD-MCNC: 7.9 G/DL — LOW (ref 13–17)
HGB BLD-MCNC: 8.2 G/DL — LOW (ref 13–17)
HGB BLD-MCNC: 8.7 G/DL — LOW (ref 13–17)
INR BLD: 1.16 RATIO — SIGNIFICANT CHANGE UP (ref 0.88–1.16)
MAGNESIUM SERPL-MCNC: 1.7 MG/DL — SIGNIFICANT CHANGE UP (ref 1.6–2.6)
MCHC RBC-ENTMCNC: 29.7 PG — SIGNIFICANT CHANGE UP (ref 27–34)
MCHC RBC-ENTMCNC: 29.7 PG — SIGNIFICANT CHANGE UP (ref 27–34)
MCHC RBC-ENTMCNC: 30.2 PG — SIGNIFICANT CHANGE UP (ref 27–34)
MCHC RBC-ENTMCNC: 31.5 GM/DL — LOW (ref 32–36)
MCHC RBC-ENTMCNC: 31.8 GM/DL — LOW (ref 32–36)
MCHC RBC-ENTMCNC: 32.1 GM/DL — SIGNIFICANT CHANGE UP (ref 32–36)
MCV RBC AUTO: 93.5 FL — SIGNIFICANT CHANGE UP (ref 80–100)
MCV RBC AUTO: 93.9 FL — SIGNIFICANT CHANGE UP (ref 80–100)
MCV RBC AUTO: 94.2 FL — SIGNIFICANT CHANGE UP (ref 80–100)
NRBC # BLD: 0 /100 WBCS — SIGNIFICANT CHANGE UP (ref 0–0)
PHOSPHATE SERPL-MCNC: 3.4 MG/DL — SIGNIFICANT CHANGE UP (ref 2.5–4.5)
PLATELET # BLD AUTO: 316 K/UL — SIGNIFICANT CHANGE UP (ref 150–400)
PLATELET # BLD AUTO: 375 K/UL — SIGNIFICANT CHANGE UP (ref 150–400)
PLATELET # BLD AUTO: 384 K/UL — SIGNIFICANT CHANGE UP (ref 150–400)
POTASSIUM SERPL-MCNC: 4.1 MMOL/L — SIGNIFICANT CHANGE UP (ref 3.5–5.3)
POTASSIUM SERPL-SCNC: 4.1 MMOL/L — SIGNIFICANT CHANGE UP (ref 3.5–5.3)
PROT SERPL-MCNC: 6.4 G/DL — SIGNIFICANT CHANGE UP (ref 6–8.3)
PROTHROM AB SERPL-ACNC: 13.3 SEC — HIGH (ref 10–12.9)
RBC # BLD: 2.62 M/UL — LOW (ref 4.2–5.8)
RBC # BLD: 2.76 M/UL — LOW (ref 4.2–5.8)
RBC # BLD: 2.93 M/UL — LOW (ref 4.2–5.8)
RBC # FLD: 14.5 % — SIGNIFICANT CHANGE UP (ref 10.3–14.5)
RBC # FLD: 14.6 % — HIGH (ref 10.3–14.5)
RBC # FLD: 14.6 % — HIGH (ref 10.3–14.5)
SODIUM SERPL-SCNC: 131 MMOL/L — LOW (ref 135–145)
SPECIMEN SOURCE: SIGNIFICANT CHANGE UP
WBC # BLD: 12.41 K/UL — HIGH (ref 3.8–10.5)
WBC # BLD: 13.35 K/UL — HIGH (ref 3.8–10.5)
WBC # BLD: 14.93 K/UL — HIGH (ref 3.8–10.5)
WBC # FLD AUTO: 12.41 K/UL — HIGH (ref 3.8–10.5)
WBC # FLD AUTO: 13.35 K/UL — HIGH (ref 3.8–10.5)
WBC # FLD AUTO: 14.93 K/UL — HIGH (ref 3.8–10.5)

## 2020-02-12 PROCEDURE — 88312 SPECIAL STAINS GROUP 1: CPT | Mod: 26

## 2020-02-12 PROCEDURE — 99233 SBSQ HOSP IP/OBS HIGH 50: CPT

## 2020-02-12 PROCEDURE — 43239 EGD BIOPSY SINGLE/MULTIPLE: CPT | Mod: GC

## 2020-02-12 PROCEDURE — 45378 DIAGNOSTIC COLONOSCOPY: CPT | Mod: GC

## 2020-02-12 PROCEDURE — 88305 TISSUE EXAM BY PATHOLOGIST: CPT | Mod: 26

## 2020-02-12 PROCEDURE — 88342 IMHCHEM/IMCYTCHM 1ST ANTB: CPT | Mod: 26

## 2020-02-12 PROCEDURE — 93010 ELECTROCARDIOGRAM REPORT: CPT

## 2020-02-12 RX ORDER — I.V. FAT EMULSION 20 G/100ML
20.8 EMULSION INTRAVENOUS
Qty: 50 | Refills: 0 | Status: DISCONTINUED | OUTPATIENT
Start: 2020-02-12 | End: 2020-02-13

## 2020-02-12 RX ORDER — MAGNESIUM SULFATE 500 MG/ML
2 VIAL (ML) INJECTION ONCE
Refills: 0 | Status: DISCONTINUED | OUTPATIENT
Start: 2020-02-12 | End: 2020-02-12

## 2020-02-12 RX ORDER — ONDANSETRON 8 MG/1
4 TABLET, FILM COATED ORAL ONCE
Refills: 0 | Status: COMPLETED | OUTPATIENT
Start: 2020-02-12 | End: 2020-02-12

## 2020-02-12 RX ORDER — ELECTROLYTE SOLUTION,INJ
1 VIAL (ML) INTRAVENOUS
Refills: 0 | Status: DISCONTINUED | OUTPATIENT
Start: 2020-02-12 | End: 2020-02-12

## 2020-02-12 RX ORDER — PANTOPRAZOLE SODIUM 20 MG/1
40 TABLET, DELAYED RELEASE ORAL EVERY 12 HOURS
Refills: 0 | Status: DISCONTINUED | OUTPATIENT
Start: 2020-02-12 | End: 2020-02-13

## 2020-02-12 RX ORDER — METOPROLOL TARTRATE 50 MG
12.5 TABLET ORAL ONCE
Refills: 0 | Status: COMPLETED | OUTPATIENT
Start: 2020-02-12 | End: 2020-02-12

## 2020-02-12 RX ORDER — METOPROLOL TARTRATE 50 MG
12.5 TABLET ORAL EVERY 12 HOURS
Refills: 0 | Status: DISCONTINUED | OUTPATIENT
Start: 2020-02-12 | End: 2020-02-27

## 2020-02-12 RX ORDER — MAGNESIUM SULFATE 500 MG/ML
2 VIAL (ML) INJECTION ONCE
Refills: 0 | Status: COMPLETED | OUTPATIENT
Start: 2020-02-12 | End: 2020-02-12

## 2020-02-12 RX ORDER — ENOXAPARIN SODIUM 100 MG/ML
40 INJECTION SUBCUTANEOUS DAILY
Refills: 0 | Status: DISCONTINUED | OUTPATIENT
Start: 2020-02-13 | End: 2020-02-27

## 2020-02-12 RX ADMIN — TAMSULOSIN HYDROCHLORIDE 0.4 MILLIGRAM(S): 0.4 CAPSULE ORAL at 21:53

## 2020-02-12 RX ADMIN — Medication 1 APPLICATION(S): at 17:21

## 2020-02-12 RX ADMIN — Medication 1 EACH: at 17:21

## 2020-02-12 RX ADMIN — I.V. FAT EMULSION 20.8 ML/HR: 20 EMULSION INTRAVENOUS at 17:21

## 2020-02-12 RX ADMIN — Medication 1 APPLICATION(S): at 17:22

## 2020-02-12 RX ADMIN — Medication 1 APPLICATION(S): at 05:16

## 2020-02-12 RX ADMIN — Medication 600 MILLIGRAM(S): at 17:22

## 2020-02-12 RX ADMIN — PANTOPRAZOLE SODIUM 40 MILLIGRAM(S): 20 TABLET, DELAYED RELEASE ORAL at 17:25

## 2020-02-12 RX ADMIN — Medication 50 GRAM(S): at 02:17

## 2020-02-12 RX ADMIN — Medication 0.5 MILLIGRAM(S): at 05:27

## 2020-02-12 RX ADMIN — ONDANSETRON 4 MILLIGRAM(S): 8 TABLET, FILM COATED ORAL at 00:41

## 2020-02-12 RX ADMIN — CHLORHEXIDINE GLUCONATE 1 APPLICATION(S): 213 SOLUTION TOPICAL at 05:17

## 2020-02-12 RX ADMIN — HYDROMORPHONE HYDROCHLORIDE 0.5 MILLIGRAM(S): 2 INJECTION INTRAMUSCULAR; INTRAVENOUS; SUBCUTANEOUS at 00:44

## 2020-02-12 RX ADMIN — Medication 3 MILLILITER(S): at 23:42

## 2020-02-12 RX ADMIN — PANTOPRAZOLE SODIUM 10 MG/HR: 20 TABLET, DELAYED RELEASE ORAL at 05:18

## 2020-02-12 RX ADMIN — Medication 3 MILLILITER(S): at 05:26

## 2020-02-12 RX ADMIN — HYDROMORPHONE HYDROCHLORIDE 0.5 MILLIGRAM(S): 2 INJECTION INTRAMUSCULAR; INTRAVENOUS; SUBCUTANEOUS at 01:00

## 2020-02-12 RX ADMIN — Medication 3 MILLILITER(S): at 11:26

## 2020-02-12 RX ADMIN — Medication 1 APPLICATION(S): at 05:17

## 2020-02-12 RX ADMIN — Medication 12.5 MILLIGRAM(S): at 20:17

## 2020-02-12 NOTE — PROGRESS NOTE ADULT - SUBJECTIVE AND OBJECTIVE BOX
Follow-up Pulm Progress Note    No new respiratory events overnight.  Denies SOB/CP.   99% on RA    Medications:  MEDICATIONS  (STANDING):  albuterol/ipratropium for Nebulization. 3 milliLiter(s) Nebulizer every 6 hours  buDESOnide    Inhalation Suspension 0.5 milliGRAM(s) Inhalation every 12 hours  chlorhexidine 2% Cloths 1 Application(s) Topical <User Schedule>  ergocalciferol 23297 Unit(s) Oral every week  fat emulsion (Fish Oil and Plant Based) 20% Infusion 20.8 mL/Hr (20.8 mL/Hr) IV Continuous <Continuous>  guaiFENesin  milliGRAM(s) Oral every 12 hours  nystatin/triamcinolone Cream 1 Application(s) Topical two times a day  pantoprazole Infusion 8 mG/Hr (10 mL/Hr) IV Continuous <Continuous>  Parenteral Nutrition - Adult 1 Each (83 mL/Hr) TPN Continuous <Continuous>  Parenteral Nutrition - Adult 1 Each (83 mL/Hr) TPN Continuous <Continuous>  tamsulosin 0.4 milliGRAM(s) Oral at bedtime  triamcinolone 0.1% Ointment 1 Application(s) Topical two times a day    MEDICATIONS  (PRN):  benzocaine 15 mG/menthol 3.6 mG (Sugar-Free) Lozenge 1 Lozenge Oral every 4 hours PRN Sore Throat  HYDROmorphone  Injectable 0.5 milliGRAM(s) IV Push every 3 hours PRN Severe Pain (7 - 10)          Vital Signs Last 24 Hrs  T(C): 37 (12 Feb 2020 11:00), Max: 37.4 (11 Feb 2020 23:00)  T(F): 98.6 (12 Feb 2020 11:00), Max: 99.3 (11 Feb 2020 23:00)  HR: 91 (12 Feb 2020 12:00) (88 - 105)  BP: 111/58 (12 Feb 2020 12:00) (111/58 - 174/81)  BP(mean): 81 (12 Feb 2020 12:00) (80 - 116)  RR: 20 (12 Feb 2020 12:00) (18 - 33)  SpO2: 94% (12 Feb 2020 12:00) (91% - 98%) on RA      VBG pH 7.37 02-11 @ 00:25    VBG pCO2 48 02-11 @ 00:25    VBG O2 sat 73 02-11 @ 00:25    VBG lactate 0.7 02-11 @ 00:25      02-11 @ 07:01  -  02-12 @ 07:00  --------------------------------------------------------  IN: 3963.2 mL / OUT: 2325 mL / NET: 1638.2 mL          LABS:                        7.9    12.41 )-----------( 316      ( 12 Feb 2020 08:30 )             24.6     02-12    131<L>  |  99  |  16  ----------------------------<  107<H>  4.1   |  23  |  0.38<L>    Ca    8.2<L>      12 Feb 2020 00:20  Phos  3.4     02-12  Mg     1.7     02-12    TPro  6.4  /  Alb  2.6<L>  /  TBili  0.6  /  DBili  0.2  /  AST  44<H>  /  ALT  82<H>  /  AlkPhos  110  02-12          CAPILLARY BLOOD GLUCOSE        PT/INR - ( 12 Feb 2020 00:20 )   PT: 13.3 sec;   INR: 1.16 ratio         PTT - ( 12 Feb 2020 00:20 )  PTT:36.4 sec                    CULTURES: (if applicable)  Culture Results:   No growth (02-07 @ 09:40)          Physical Examination:  PULM: Diminished bilaterally   CVS: S1, S2 heard    RADIOLOGY REVIEWED  CXR: L costophrenic angle off film   L atelectasis

## 2020-02-12 NOTE — PROGRESS NOTE ADULT - ATTENDING COMMENTS
Patient seen and examined and agree with above.   POD# 8 s/p reversal of ileostomy with side to side ileocolic anastamosis  No longer BM with melena  Plan for endoscopy and colonoscopy with plan for today  H/H stable   Hct 24-->27-->24  Hemodynamically stable   Respiratory state is stable with SpO2 94-95%  COPD- continue pulmicort at this time   -will add bevespi  Hemodynamically appropriate   Gi - NPO  and TPN  Will start metoprolol for tachycardia post procedure today.                                           Plan for prep for colonoscopy and scope today

## 2020-02-12 NOTE — PROGRESS NOTE ADULT - SUBJECTIVE AND OBJECTIVE BOX
Pre-Endoscopy Evaluation      Referring Physician: dr. john cazares                                  Procedure:  upper gastrointestinal endoscopy/colonoscopy    Indication for Procedure: gib    Sedation by Anesthesia [x]    PAST MEDICAL & SURGICAL HISTORY:  COPD with hypoxia  ETOH abuse  History of lumbosacral spine surgery  History of prostate surgery  History of appendectomy      PMH of Gastroparesis [ ]  Gastric Surgery [ ]  Gastric Outlet Obstruction [ ]    Allergies    IV Contrast (Hives)    Intolerances    Latex allergy: [ ] yes [X] no    Medications:MEDICATIONS  (STANDING):  albuterol/ipratropium for Nebulization. 3 milliLiter(s) Nebulizer every 6 hours  buDESOnide    Inhalation Suspension 0.5 milliGRAM(s) Inhalation every 12 hours  chlorhexidine 2% Cloths 1 Application(s) Topical <User Schedule>  ergocalciferol 58306 Unit(s) Oral every week  fat emulsion (Fish Oil and Plant Based) 20% Infusion 20.8 mL/Hr (20.8 mL/Hr) IV Continuous <Continuous>  guaiFENesin  milliGRAM(s) Oral every 12 hours  nystatin/triamcinolone Cream 1 Application(s) Topical two times a day  pantoprazole Infusion 8 mG/Hr (10 mL/Hr) IV Continuous <Continuous>  Parenteral Nutrition - Adult 1 Each (83 mL/Hr) TPN Continuous <Continuous>  Parenteral Nutrition - Adult 1 Each (83 mL/Hr) TPN Continuous <Continuous>  tamsulosin 0.4 milliGRAM(s) Oral at bedtime  triamcinolone 0.1% Ointment 1 Application(s) Topical two times a day    MEDICATIONS  (PRN):  benzocaine 15 mG/menthol 3.6 mG (Sugar-Free) Lozenge 1 Lozenge Oral every 4 hours PRN Sore Throat  HYDROmorphone  Injectable 0.5 milliGRAM(s) IV Push every 3 hours PRN Severe Pain (7 - 10)      Smoking: [ ] yes  [X] no    AICD/PPM: [ ] yes   [x] no    Pertinent lab data:                        7.9    12.41 )-----------( 316      ( 2020 08:30 )             24.6     02-12    131<L>  |  99  |  16  ----------------------------<  107<H>  4.1   |  23  |  0.38<L>    Ca    8.2<L>      2020 00:20  Phos  3.4     02-12  Mg     1.7     02-12    TPro  6.4  /  Alb  2.6<L>  /  TBili  0.6  /  DBili  0.2  /  AST  44<H>  /  ALT  82<H>  /  AlkPhos  110  02-12    PT/INR - ( 2020 00:20 )   PT: 13.3 sec;   INR: 1.16 ratio         PTT - ( 2020 00:20 )  PTT:36.4 sec          Physical Examination:  Daily     Daily Weight in k.2 (2020 01:00)  Vital Signs Last 24 Hrs  T(C): 37 (2020 11:00), Max: 37.4 (2020 23:00)  T(F): 98.6 (2020 11:00), Max: 99.3 (2020 23:00)  HR: 95 (2020 13:00) (88 - 105)  BP: 112/57 (2020 13:00) (111/58 - 174/81)  BP(mean): 80 (2020 13:00) (80 - 116)  RR: 25 (2020 13:00) (18 - 33)  SpO2: 93% (2020 13:00) (91% - 98%)        Constitutional: NAD    HEENT: PERRLA, EOMI,       Neck:  No JVD    Respiratory: CTAB/L    Cardiovascular: S1 and S2    Gastrointestinal: BS+, soft, NT/ND    Extremities: No peripheral edema    Neurological: A/O x 3    : No Martinez    Skin: No rashes    Comments:    The patient is a suitable candidate for the planned procedure unless box checked [ ]  No, explain:

## 2020-02-12 NOTE — CHART NOTE - NSCHARTNOTEFT_GEN_A_CORE
STATUS POST:  EGD and colonoscopy     POST OPERATIVE DAY #: 0    SUBJECTIVE: Pt seen at bedside, in no acute distress, denies any throat, abdominal or rectal pain. Denies chest pain, SOB, nausea, vomiting, fever and chills      Vital Signs Last 24 Hrs  T(C): 37.3 (12 Feb 2020 19:00), Max: 37.4 (11 Feb 2020 23:00)  T(F): 99.1 (12 Feb 2020 19:00), Max: 99.3 (11 Feb 2020 23:00)  HR: 102 (12 Feb 2020 20:00) (83 - 103)  BP: 158/72 (12 Feb 2020 20:00) (111/58 - 174/81)  BP(mean): 104 (12 Feb 2020 20:00) (80 - 126)  RR: 25 (12 Feb 2020 20:00) (18 - 33)  SpO2: 94% (12 Feb 2020 20:00) (91% - 99%)  I&O's Summary    11 Feb 2020 07:01  -  12 Feb 2020 07:00  --------------------------------------------------------  IN: 3963.2 mL / OUT: 2325 mL / NET: 1638.2 mL    12 Feb 2020 07:01  -  12 Feb 2020 20:43  --------------------------------------------------------  IN: 1345.2 mL / OUT: 1300 mL / NET: 45.2 mL      I&O's Detail    11 Feb 2020 07:01  -  12 Feb 2020 07:00  --------------------------------------------------------  IN:    fat emulsion (Fish Oil and Plant Based) 20% Infusion: 291.2 mL    Oral Fluid: 1440 mL    pantoprazole Infusion: 240 mL    TPN (Total Parenteral Nutrition): 1992 mL  Total IN: 3963.2 mL    OUT:    Voided: 2325 mL  Total OUT: 2325 mL    Total NET: 1638.2 mL      12 Feb 2020 07:01  -  12 Feb 2020 20:43  --------------------------------------------------------  IN:    fat emulsion (Fish Oil and Plant Based) 20% Infusion: 83.2 mL    pantoprazole Infusion: 100 mL    TPN (Total Parenteral Nutrition): 1162 mL  Total IN: 1345.2 mL    OUT:    Voided: 1300 mL  Total OUT: 1300 mL    Total NET: 45.2 mL          MEDICATIONS  (STANDING):  albuterol/ipratropium for Nebulization. 3 milliLiter(s) Nebulizer every 6 hours  buDESOnide    Inhalation Suspension 0.5 milliGRAM(s) Inhalation every 12 hours  chlorhexidine 2% Cloths 1 Application(s) Topical <User Schedule>  ergocalciferol 81631 Unit(s) Oral every week  fat emulsion (Fish Oil and Plant Based) 20% Infusion 20.8 mL/Hr (20.8 mL/Hr) IV Continuous <Continuous>  guaiFENesin  milliGRAM(s) Oral every 12 hours  metoprolol tartrate 12.5 milliGRAM(s) Oral every 12 hours  nystatin/triamcinolone Cream 1 Application(s) Topical two times a day  pantoprazole  Injectable 40 milliGRAM(s) IV Push every 12 hours  Parenteral Nutrition - Adult 1 Each (83 mL/Hr) TPN Continuous <Continuous>  tamsulosin 0.4 milliGRAM(s) Oral at bedtime  triamcinolone 0.1% Ointment 1 Application(s) Topical two times a day    MEDICATIONS  (PRN):  benzocaine 15 mG/menthol 3.6 mG (Sugar-Free) Lozenge 1 Lozenge Oral every 4 hours PRN Sore Throat  HYDROmorphone  Injectable 0.5 milliGRAM(s) IV Push every 3 hours PRN Severe Pain (7 - 10)      LABS:                        7.9    12.41 )-----------( 316      ( 12 Feb 2020 08:30 )             24.6     02-12    131<L>  |  99  |  16  ----------------------------<  107<H>  4.1   |  23  |  0.38<L>    Ca    8.2<L>      12 Feb 2020 00:20  Phos  3.4     02-12  Mg     1.7     02-12    TPro  6.4  /  Alb  2.6<L>  /  TBili  0.6  /  DBili  0.2  /  AST  44<H>  /  ALT  82<H>  /  AlkPhos  110  02-12    PT/INR - ( 12 Feb 2020 00:20 )   PT: 13.3 sec;   INR: 1.16 ratio         PTT - ( 12 Feb 2020 00:20 )  PTT:36.4 sec      RADIOLOGY & ADDITIONAL STUDIES:    Physical Exam:  General: NAD, resting comfortably  HEENT: NC/AT,   Pulmonary: normal resp effort  Cardiovascular: NSR, no murmurs  Abdominal: soft, non distended, non tender, dressing c/d/i  Neuro: no focal deficits      A/P: 75y Male s/p as above   - continue excellent sicu care

## 2020-02-12 NOTE — PROGRESS NOTE ADULT - SUBJECTIVE AND OBJECTIVE BOX
Subjective: Patient seen and examined. No new events except as noted.   remains in ICU   no melena   feels ok     REVIEW OF SYSTEMS:    CONSTITUTIONAL:+ weakness, fevers or chills  EYES/ENT: No visual changes;  No vertigo or throat pain   NECK: No pain or stiffness  RESPIRATORY: No cough, wheezing, hemoptysis; No shortness of breath  CARDIOVASCULAR: No chest pain or palpitations  GASTROINTESTINAL: No abdominal or epigastric pain. No nausea, vomiting, or hematemesis; No diarrhea or constipation. No melena or hematochezia.  GENITOURINARY: No dysuria, frequency or hematuria  NEUROLOGICAL: No numbness or weakness  SKIN: No itching, burning, rashes, or lesions   All other review of systems is negative unless indicated above.    MEDICATIONS:  MEDICATIONS  (STANDING):  albuterol/ipratropium for Nebulization. 3 milliLiter(s) Nebulizer every 6 hours  buDESOnide    Inhalation Suspension 0.5 milliGRAM(s) Inhalation every 12 hours  chlorhexidine 2% Cloths 1 Application(s) Topical <User Schedule>  ergocalciferol 05019 Unit(s) Oral every week  fat emulsion (Fish Oil and Plant Based) 20% Infusion 20.8 mL/Hr (20.8 mL/Hr) IV Continuous <Continuous>  guaiFENesin  milliGRAM(s) Oral every 12 hours  nystatin/triamcinolone Cream 1 Application(s) Topical two times a day  pantoprazole Infusion 8 mG/Hr (10 mL/Hr) IV Continuous <Continuous>  Parenteral Nutrition - Adult 1 Each (83 mL/Hr) TPN Continuous <Continuous>  tamsulosin 0.4 milliGRAM(s) Oral at bedtime  triamcinolone 0.1% Ointment 1 Application(s) Topical two times a day      PHYSICAL EXAM:  T(C): 37 (02-12-20 @ 07:00), Max: 37.4 (02-11-20 @ 23:00)  HR: 95 (02-12-20 @ 10:00) (90 - 105)  BP: 128/63 (02-12-20 @ 10:00) (117/56 - 174/81)  RR: 21 (02-12-20 @ 10:00) (18 - 33)  SpO2: 94% (02-12-20 @ 10:00) (91% - 97%)  Wt(kg): --  I&O's Summary    11 Feb 2020 07:01  -  12 Feb 2020 07:00  --------------------------------------------------------  IN: 3963.2 mL / OUT: 2325 mL / NET: 1638.2 mL    12 Feb 2020 07:01  -  12 Feb 2020 11:03  --------------------------------------------------------  IN: 372 mL / OUT: 200 mL / NET: 172 mL          Appearance: NAD  HEENT:   Normal oral mucosa, PERRL, EOMI	  Lymphatic: No lymphadenopathy , no edema  Cardiovascular: Normal S1 S2, No JVD, No murmurs , Peripheral pulses palpable 2+ bilaterally  Respiratory: Lungs clear to auscultation, normal effort 	  Gastrointestinal:  Soft, Non-tender, + BS	  Skin: No rashes, No ecchymoses, No cyanosis, warm to touch  Musculoskeletal: Normal range of motion, normal strength  Psychiatry:  Mood & affect appropriate  Ext: No edema      LABS:    CARDIAC MARKERS:                                7.9    12.41 )-----------( 316      ( 12 Feb 2020 08:30 )             24.6     02-12    131<L>  |  99  |  16  ----------------------------<  107<H>  4.1   |  23  |  0.38<L>    Ca    8.2<L>      12 Feb 2020 00:20  Phos  3.4     02-12  Mg     1.7     02-12    TPro  6.4  /  Alb  2.6<L>  /  TBili  0.6  /  DBili  0.2  /  AST  44<H>  /  ALT  82<H>  /  AlkPhos  110  02-12    proBNP:   Lipid Profile:   HgA1c:   TSH:             TELEMETRY: 	  SR, PACs  ECG:  	NSR< no acute ischemic stt changes   RADIOLOGY:   DIAGNOSTIC TESTING:  [ ] Echocardiogram:  [ ]  Catheterization:  [ ] Stress Test:    OTHER:

## 2020-02-12 NOTE — PROGRESS NOTE ADULT - SUBJECTIVE AND OBJECTIVE BOX
[facility] NUTRITION SUPPORT / TPN -- FOLLOW UP NOTE  --------------------------------------------------------------------------------    24 hour events/subjective:    H/H stable  Pt given bowel prep  BM multiple loose BM, no blood noted        Pt on Protonix gtt  Pt for EGD/?colonoscopy    Tolerating LRD but made NPO for testing  Pain appropriate - relief w/ pain Rx  Urinary retention improved w/ Cardura now on Flomax  No n/v  No cough/ cp/ palp/dyspnea/ sob  No f/c/s      Diet:  Diet, Clear Liquid (02-11-20 @ 13:22)  Diet, NPO after Midnight:      NPO Start Date: 11-Feb-2020,   NPO Start Time: 23:59 (02-11-20 @ 13:22)      Appetite: [ x ]Poor [  ]Adequate [  ]Good  Caloric intake:  [  x ]  Adequate   [   ] Inadequate    ROS: General/ GI see HPI  all other systems negative      ALLERGIES & MEDICATIONS  --------------------------------------------------------------------------------  ALLERGIES  IV Contrast (Hives)    STANDING INPATIENT MEDICATIONS    albuterol/ipratropium for Nebulization. 3 milliLiter(s) Nebulizer every 6 hours  buDESOnide    Inhalation Suspension 0.5 milliGRAM(s) Inhalation every 12 hours  chlorhexidine 2% Cloths 1 Application(s) Topical <User Schedule>  ergocalciferol 59063 Unit(s) Oral every week  fat emulsion (Fish Oil and Plant Based) 20% Infusion 20.8 mL/Hr IV Continuous <Continuous>  guaiFENesin  milliGRAM(s) Oral every 12 hours  nystatin/triamcinolone Cream 1 Application(s) Topical two times a day  pantoprazole Infusion 8 mG/Hr IV Continuous <Continuous>  Parenteral Nutrition - Adult 1 Each TPN Continuous <Continuous>  tamsulosin 0.4 milliGRAM(s) Oral at bedtime  triamcinolone 0.1% Ointment 1 Application(s) Topical two times a day      PRN INPATIENT MEDICATION  benzocaine 15 mG/menthol 3.6 mG (Sugar-Free) Lozenge 1 Lozenge Oral every 4 hours PRN  HYDROmorphone  Injectable 0.5 milliGRAM(s) IV Push every 3 hours PRN        VITALS/PHYSICAL EXAM  --------------------------------------------------------------------------------  T(C): 37 (02-12-20 @ 07:00), Max: 37.4 (02-11-20 @ 23:00)  HR: 95 (02-12-20 @ 10:00) (89 - 105)  BP: 128/63 (02-12-20 @ 10:00) (117/56 - 174/81)  RR: 21 (02-12-20 @ 10:00) (16 - 33)  SpO2: 94% (02-12-20 @ 10:00) (91% - 97%)  Wt(kg): --  Height (cm): 175 (02-11-20 @ 07:14)  Weight (kg): 54 (02-11-20 @ 07:14)  BMI (kg/m2): 17.6 (02-11-20 @ 07:14)  BSA (m2): 1.66 (02-11-20 @ 07:14)      02-11-20 @ 07:01  -  02-12-20 @ 07:00  --------------------------------------------------------  IN: 3963.2 mL / OUT: 2325 mL / NET: 1638.2 mL    02-12-20 @ 07:01  -  02-12-20 @ 10:33  --------------------------------------------------------  IN: 372 mL / OUT: 200 mL / NET: 172 mL    PHYSICAL EXAM:  --------------------------------------------------------------------------------  	Gen: guarded but stable, A&Ox3, NC O2  	HEENT: NC/AT, PERRL, supple neck, trachea midline, mucosa moist              GI: (+) BS, softly distended, nontender                    midline dressing c/d/i                    Rt sided  ostomy site dressing c/d/i              MSK: FROM x4, no contractures  	Vascular: Equally Warm,  no edema, no clubbing, cyanosis,                      RUE PICC w/o sx infection   	Neuro: No focal deficits, intact sensation, weakened strength BLE>BUE  	Psych: Normal affect and mood              skin: moist, erythematous pinpoint rash chest/ back, groin        LABS/ CULTURES/ RADIOLOGY:              7.9    12.41 >-----------<  316      [02-12-20 @ 08:30]              24.6     131  |  99  |  16  ----------------------------<  107      [02-12-20 @ 00:20]  4.1   |  23  |  0.38        Ca     8.2     [02-12-20 @ 00:20]      iCa    1.12     [02-12 @ 01:04]      Mg     1.7     [02-12-20 @ 00:20]      Phos  3.4     [02-12-20 @ 00:20]    TPro  6.4  /  Alb  2.6  /  TBili  0.6  /  DBili  0.2  /  AST  44  /  ALT  82  /  AlkPhos  110  [02-12-20 @ 00:20]    PT/INR: PT 13.3 , INR 1.16       [02-12-20 @ 00:20]  PTT: 36.4       [02-12-20 @ 00:20]      Blood Gas Calcium, Ionized - Venous: 1.16 mmoL/L (02-11-20 @ 00:25)      Glucose, Serum: 107 mg/dL (02-12-20 @ 00:20)    CAPILLARY BLOOD GLUCOSE  Prealbumin, Serum: 17 mg/dL (02-10-20 @ 09:15)  Prealbumin, Serum: 21 mg/dL (02-03-20 @ 23:50)  Prealbumin, Serum: 19 mg/dL (02-03-20 @ 07:18)  Prealbumin, Serum: 18 mg/dL (02-02-20 @ 09:48)  Prealbumin, Serum: 18 mg/dL (01-31-20 @ 07:40) [facility] NUTRITION SUPPORT / TPN -- FOLLOW UP NOTE  --------------------------------------------------------------------------------    24 hour events/subjective:  H/H stable  Pt given bowel prep  BM multiple liquidy/ loose BM, no blood noted        Pt on Protonix gtt  Pt for EGD/colonoscopy today  Tolerating was tolerating LRD - made NPO for testing  Pain appropriate - relief w/ pain Rx  Urinary retention improved w/ Cardura now on Flomax  No n/v  No cough/ cp/ palp/dyspnea/ sob  No f/c/s      Diet:  Diet, Clear Liquid (02-11-20 @ 13:22)  Diet, NPO after Midnight:      NPO Start Date: 11-Feb-2020,   NPO Start Time: 23:59 (02-11-20 @ 13:22)      Appetite: [ x ]Poor [  ]Adequate [  ]Good  Caloric intake:  [  x ]  Adequate   [   ] Inadequate    ROS: General/ GI see HPI  all other systems negative      ALLERGIES & MEDICATIONS  --------------------------------------------------------------------------------  ALLERGIES  IV Contrast (Hives)    STANDING INPATIENT MEDICATIONS    albuterol/ipratropium for Nebulization. 3 milliLiter(s) Nebulizer every 6 hours  buDESOnide    Inhalation Suspension 0.5 milliGRAM(s) Inhalation every 12 hours  chlorhexidine 2% Cloths 1 Application(s) Topical <User Schedule>  ergocalciferol 13971 Unit(s) Oral every week  fat emulsion (Fish Oil and Plant Based) 20% Infusion 20.8 mL/Hr IV Continuous <Continuous>  guaiFENesin  milliGRAM(s) Oral every 12 hours  nystatin/triamcinolone Cream 1 Application(s) Topical two times a day  pantoprazole Infusion 8 mG/Hr IV Continuous <Continuous>  Parenteral Nutrition - Adult 1 Each TPN Continuous <Continuous>  tamsulosin 0.4 milliGRAM(s) Oral at bedtime  triamcinolone 0.1% Ointment 1 Application(s) Topical two times a day      PRN INPATIENT MEDICATION  benzocaine 15 mG/menthol 3.6 mG (Sugar-Free) Lozenge 1 Lozenge Oral every 4 hours PRN  HYDROmorphone  Injectable 0.5 milliGRAM(s) IV Push every 3 hours PRN        VITALS/PHYSICAL EXAM  --------------------------------------------------------------------------------  T(C): 37 (02-12-20 @ 07:00), Max: 37.4 (02-11-20 @ 23:00)  HR: 95 (02-12-20 @ 10:00) (89 - 105)  BP: 128/63 (02-12-20 @ 10:00) (117/56 - 174/81)  RR: 21 (02-12-20 @ 10:00) (16 - 33)  SpO2: 94% (02-12-20 @ 10:00) (91% - 97%)  Wt(kg): --  Height (cm): 175 (02-11-20 @ 07:14)  Weight (kg): 54 (02-11-20 @ 07:14)  BMI (kg/m2): 17.6 (02-11-20 @ 07:14)  BSA (m2): 1.66 (02-11-20 @ 07:14)      02-11-20 @ 07:01  -  02-12-20 @ 07:00  --------------------------------------------------------  IN: 3963.2 mL / OUT: 2325 mL / NET: 1638.2 mL    02-12-20 @ 07:01 - 02-12-20 @ 10:33  --------------------------------------------------------  IN: 372 mL / OUT: 200 mL / NET: 172 mL    PHYSICAL EXAM:  --------------------------------------------------------------------------------  	Gen: guarded but stable, A&Ox3, NC O2  	HEENT: NC/AT, PERRL, supple neck, trachea midline, mucosa moist              GI: (+) BS, softly distended, nontender                    midline dressing c/d/i                    Rt sided ostomy site dressing c/d/i              MSK: FROM x4, no contractures  	Vascular: Equally Warm,  no edema, no clubbing, cyanosis,                      RUE PICC w/o sx infection   	Neuro: No focal deficits, intact sensation, weakened strength BLE>BUE  	Psych: Normal affect and mood              skin: moist, erythematous pinpoint rash chest/ back, groin        LABS/ CULTURES/ RADIOLOGY:              7.9    12.41 >-----------<  316      [02-12-20 @ 08:30]              24.6     131  |  99  |  16  ----------------------------<  107      [02-12-20 @ 00:20]  4.1   |  23  |  0.38        Ca     8.2     [02-12-20 @ 00:20]      iCa    1.12     [02-12 @ 01:04]      Mg     1.7     [02-12-20 @ 00:20]      Phos  3.4     [02-12-20 @ 00:20]    TPro  6.4  /  Alb  2.6  /  TBili  0.6  /  DBili  0.2  /  AST  44  /  ALT  82  /  AlkPhos  110  [02-12-20 @ 00:20]    PT/INR: PT 13.3 , INR 1.16       [02-12-20 @ 00:20]  PTT: 36.4       [02-12-20 @ 00:20]      Blood Gas Calcium, Ionized - Venous: 1.16 mmoL/L (02-11-20 @ 00:25)      Prealbumin, Serum: 17 mg/dL (02-10-20 @ 09:15)  Prealbumin, Serum: 21 mg/dL (02-03-20 @ 23:50)  Prealbumin, Serum: 19 mg/dL (02-03-20 @ 07:18)  Prealbumin, Serum: 18 mg/dL (02-02-20 @ 09:48)  Prealbumin, Serum: 18 mg/dL (01-31-20 @ 07:40)     Triglycerides, Serum: 59 mg/dL (02.10.20 @ 01:03)  Triglycerides, Serum: 59 mg/dL (02.03.20 @ 00:24)  Triglycerides, Serum: 61 mg/dL (02.02.20 @ 01:41)  Triglycerides, Serum: 51 mg/dL (01.31.20 @ 04:10)  Triglycerides, Serum: 83 mg/dL (01.27.20 @ 00:14)  Triglycerides, Serum: 50 mg/dL (01.07.20 @ 01:05)

## 2020-02-12 NOTE — PROGRESS NOTE ADULT - ASSESSMENT
75 y/o male presenting with high grade SBO s/p exploratory laparotomy, lysis of adhesions, decompression of bowel via enterotomy w/ primary repair, & Abthera VAC placement (12/06/2019); RTOR for re-exploration w/ Abthera VAC replacement (12/08/2019) to allow for demarcation of ischemic small bowel; RTOR for re-exploration, small bowel resection of 150 cm (150 cm remaining), ileocecetomy, end ileostomy, mucous fistula, and abdominal closure (12/10/2019); hospital course complicated by SVT, short bowel syndrome requiring repletions w/ IV fluids, malnutrition requiring TPN, intermittent episodes of hypotension, spontaneous left pneumothorax s/p pigtail catheter (12/15/2019-12/24/2019), left pleural effusion s/p pigtail catheter w/ IR (12/30/2019) & subsequent placement of Pleur-X catheter (1/10/2020) c/b left hemothorax s/p left VATS, dysphagia, and oral HSV lesions; now s/p ileostomy reversal    PLAN:    Neuro: acute post-op pain  - Monitor mental status  - Pain control as needed with IV acetaminophen and Dilaudid    Resp: COPD, spontaneous left pneumothorax s/p pigtail catheter, left pleural effusion s/p Pleur-X catheter c/b hemothorax s/p left VATS  - Monitor pulse oximeter  - Pulmicort and Duoneb for COPD  - Guaifenesin as needed for cough  - Will need outpatient follow-up with a pulmonologist for his COPD as patient does not currently have one    CV: SVT  - Monitor vital signs    GI: s/p exploratory laparotomy, Grecia, decompression of bowel via enterotomy w/ primary repair, & Abthera VAC placement (12/06/2019); re-exploration w/ ABthera VAC replacement (12/08/2019); s/p re-exploration, small bowel resection of 150 cm (150 cm remaining), ileocecectomy end ileostomy, mucous fistula, & abdominal closure (12/10/2019); s/p ileostomy reversal (2/4/2020); short bowel syndrome, malnutrition, dysphagia, melena  - CLD and TPN  - Plan for scope with GI today, took bowel prep  - Protonix infusion in the setting of GI bleed    Renal: urinary retention, polyuria (resolved)  - Monitor I&Os  - Monitor electrolytes and replete as necessary  - Flomax for urinary retention    Heme: anemia likely secondary to malnutrition / malabsorption  - Monitor CBC and coags  - Venodynes for mechanical VTE prophylaxis, holding chemical VTE prophylaxis in the setting of GI bleeding    ID: oral HSV lesions (s/p acyclovir 12/23/2019-01/02/2020), Pseudomonas PNA (s/p meropenem 1/19/2020-1/26/2020)  - Monitor for clinical evidence of active infection    Endo: no acute issues  - Monitor glucose on BMP    Disposition:  - Full code  - Will remain in SICU    Janelle Bah PA-C    [id number]

## 2020-02-12 NOTE — PROGRESS NOTE ADULT - SUBJECTIVE AND OBJECTIVE BOX
Interval events:  - Advanced to CLD  - Underwent bowel prep for scope with GI  - HCT stable from 23.8 to 24.7 to 24.8 to 27.4    S: Patient doing well, denies fevers, chills, nausea, emesis, chest pain, SOB. Patient uncomfortable due to bowel prep and multiple bowel movements overnight, nonbloody, otherwise no complaints.    O: Vital Signs  T(C): 37 (02-12 @ 07:00), Max: 37.4 (02-11 @ 23:00)  HR: 91 (02-12 @ 08:00) (87 - 105)  BP: 117/56 (02-12 @ 08:00) (117/56 - 174/81)  RR: 20 (02-12 @ 08:00) (16 - 33)  SpO2: 96% (02-12 @ 08:00) (91% - 97%)  02-11-20 @ 07:01  -  02-12-20 @ 07:00  --------------------------------------------------------  IN: 3963.2 mL / OUT: 2325 mL / NET: 1638.2 mL    02-12-20 @ 07:01  -  02-12-20 @ 09:53  --------------------------------------------------------  IN: 93 mL / OUT: 200 mL / NET: -107 mL      General: alert and oriented, NAD  Resp: airway patent, respirations unlabored  CVS: regular rate and rhythm  Abdomen: soft, nontender, nondistended, incision intact w/SS drainage  Extremities: no edema  Skin: warm, dry, appropriate color                          7.9    12.41 )-----------( 316      ( 12 Feb 2020 08:30 )             24.6   02-12    131<L>  |  99  |  16  ----------------------------<  107<H>  4.1   |  23  |  0.38<L>    Ca    8.2<L>      12 Feb 2020 00:20  Phos  3.4     02-12  Mg     1.7     02-12    TPro  6.4  /  Alb  2.6<L>  /  TBili  0.6  /  DBili  0.2  /  AST  44<H>  /  ALT  82<H>  /  AlkPhos  110  02-12

## 2020-02-12 NOTE — PROGRESS NOTE ADULT - SUBJECTIVE AND OBJECTIVE BOX
24 HOUR EVENTS:  - Advanced to CLD  - Underwent bowel prep for scope with GI  - HCT stable from 23.8 to 24.7 to 24.8 to 27.4    75y Male  COPD and EtOH dependence who presented on 12/6/2019 with abdominal pain, nausea, hematemesis, and poor PO intake for ~2-3 days. In the ED, he was found to be hypotensive & tachycardic. Labs revealed an CHI and lactic acidosis. Imaging revealed a high grade SBO in the RLQ on CT scan. Hospital course is as follows:  12/06 - s/p exploratory laparotomy, lysis of adhesions, decompression of bowel via enterotomy w/ primary repair, and Abthera VAC placement as the distal 50% of the bowel appeared dusky but was still viable, admitted to SICU post-operatively as he was on vasopressor support and was left intubated  12/08 - s/p re-exploration, proximal 165 cm of small bowel appeared pink & viable but beyond that had patchy areas of ischemia so decision was made to give the bowel more time to demarcate before resecting in order to preserve as much small bowel as possible so Abthera VAC was replaced  12/09 - s/p re-exploration, small bowel resection of 150 cm (150 cm remaining), ileocecetomy, end ileostomy, mucous fistula, and abdominal closure  12/11 - extubated to BiPAP, noted to be in SVT that was rate controlled w/ metoprolol  12/14 - started on TPN  12/15 - noted to have spontaneous left pneumothorax s/p left pigtail catheter placement, noted to be in SVT again so amiodarone started, high ileostomy output noted so concern for short bowel syndrome  12/16 - amiodarone discontinued, started on beta blocker with metoprolol  12/18 - started Lomotil and ileostomy repletions with IV fluids  12/19 - started Imodium, left pigtail catheter was placed to water seal but pneumothorax noted on follow-up CXR so placed back to suction  12/20 - started tincture of opium, concern for aspiration so changed diet from regular to dysphagia 1 pureed with nectar-thickened liquids  12/22 - left pigtail catheter placed to water seal with no pneumothorax noted on follow-up CXR, CT chest with moderate left pleural effusion not being drained by pigtail catheter  12/23 - 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  12/24 - left pigtail catheter discontinued  12/28 - started Ancef for erythematous midline wound  12/29 - beta blocker discontinued for intermittent episodes of hypotension  12/30 - left pigtail catheter placement by IR with drainage of serous transudative fluid  01/02 - FEES demonstrating penetration with thin liquids so patient kept on dysphagia 1 pureed with nectar-thickened liquids  01/07 - transferred to floors  01/10 - s/p left VATS w/ PleurX catheter placement  01/11 - evaluated by speech & swallow, advanced diet to mechanical soft with thin liquids  01/12 - PleurX catheter placed to water seal with on pneumothorax on follow-up CXR  01/13 - RRT for hypoxemia, placed on BiPAP, PleurX catheter placed back to suction, started vancomycin & Zosyn for possible HCAP  01/14 - noted to have minimal output from PleurX catheter, lung ultrasound with large left pleural effusion concerning for hemothorax, multiple episodes of bradycardia secondary to hypoxemia, remains on BiPAP  01/15 - worsening respiratory distress requiring intubation, hypotensive requiring vasopressor support, CT chest with large left hemothorax w/ trace pneumothorax & extensive subcutaneous emphysema so taken to OR emergently for left VATS, evacuation fo 2 L of clot, and placement of two left chest tubes  01/16 - extubated, weaned off vasopressor support  01/17 - two left chest tubes placed to water seal with no pneumothorax noted on follow-up CXR  01/22 - inferior chest tube discontinued with no pneumothorax noted on follow-up CXR, polyuria noted  01/24 - given DDAVP for polyuria w/ good response  01/25 - superior chest tube discontinued with no pneumothorax noted on follow-up CXR  02/04 - s/p exploratory laparotomy, lysis of adhesions, and ileostomy reversal 24 HOUR EVENTS:  - Advanced to CLD  - Underwent bowel prep for scope with GI  - HCT stable from 23.8 to 24.7 to 24.8 to 27.4    76 y/o male with a PMHx of COPD and EtOH dependence who presented on 12/6/2019 with abdominal pain, nausea, hematemesis, and poor PO intake for ~2-3 days. In the ED, he was found to be hypotensive & tachycardic. Labs revealed an CHI and lactic acidosis. Imaging revealed a high grade SBO in the RLQ on CT scan. Hospital course is as follows:  12/06 - s/p exploratory laparotomy, lysis of adhesions, decompression of bowel via enterotomy w/ primary repair, and Abthera VAC placement as the distal 50% of the bowel appeared dusky but was still viable, admitted to SICU post-operatively as he was on vasopressor support and was left intubated  12/08 - s/p re-exploration, proximal 165 cm of small bowel appeared pink & viable but beyond that had patchy areas of ischemia so decision was made to give the bowel more time to demarcate before resecting in order to preserve as much small bowel as possible so Abthera VAC was replaced  12/09 - s/p re-exploration, small bowel resection of 150 cm (150 cm remaining), ileocecetomy, end ileostomy, mucous fistula, and abdominal closure  12/11 - extubated to BiPAP, noted to be in SVT that was rate controlled w/ metoprolol  12/14 - started on TPN  12/15 - noted to have spontaneous left pneumothorax s/p left pigtail catheter placement, noted to be in SVT again so amiodarone started, high ileostomy output noted so concern for short bowel syndrome  12/16 - amiodarone discontinued, started on beta blocker with metoprolol  12/18 - started Lomotil and ileostomy repletions with IV fluids  12/19 - started Imodium, left pigtail catheter was placed to water seal but pneumothorax noted on follow-up CXR so placed back to suction  12/20 - started tincture of opium, concern for aspiration so changed diet from regular to dysphagia 1 pureed with nectar-thickened liquids  12/22 - left pigtail catheter placed to water seal with no pneumothorax noted on follow-up CXR, CT chest with moderate left pleural effusion not being drained by pigtail catheter  12/23 - 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  12/24 - left pigtail catheter discontinued  12/28 - started Ancef for erythematous midline wound  12/29 - beta blocker discontinued for intermittent episodes of hypotension  12/30 - left pigtail catheter placement by IR with drainage of serous transudative fluid  01/02 - FEES demonstrating penetration with thin liquids so patient kept on dysphagia 1 pureed with nectar-thickened liquids  01/07 - transferred to floors  01/10 - s/p left VATS w/ PleurX catheter placement  01/11 - evaluated by speech & swallow, advanced diet to mechanical soft with thin liquids  01/12 - PleurX catheter placed to water seal with on pneumothorax on follow-up CXR  01/13 - RRT for hypoxemia, placed on BiPAP, PleurX catheter placed back to suction, started vancomycin & Zosyn for possible HCAP  01/14 - noted to have minimal output from PleurX catheter, lung ultrasound with large left pleural effusion concerning for hemothorax, multiple episodes of bradycardia secondary to hypoxemia, remains on BiPAP  01/15 - worsening respiratory distress requiring intubation, hypotensive requiring vasopressor support, CT chest with large left hemothorax w/ trace pneumothorax & extensive subcutaneous emphysema so taken to OR emergently for left VATS, evacuation fo 2 L of clot, and placement of two left chest tubes  01/16 - extubated, weaned off vasopressor support  01/17 - two left chest tubes placed to water seal with no pneumothorax noted on follow-up CXR  01/22 - inferior chest tube discontinued with no pneumothorax noted on follow-up CXR, polyuria noted  01/24 - given DDAVP for polyuria w/ good response  01/25 - superior chest tube discontinued with no pneumothorax noted on follow-up CXR  02/04 - s/p exploratory laparotomy, lysis of adhesions, and ileostomy reversal  02/05 - episode of melena but was hemodynamically stable and H&H was stable, likely due to oozing from anastomosis  02/06 - NG tube d/cherie, advanced to sips  02/08 - multiple episodes of melena and H&H drop requiring blood, started Protonix infusion, GI consulted  02/09 - continues to have melena, stopped VTE prophylaxis    SUBJECTIVE/ROS:  [x] A ten-point review of systems was otherwise negative except as noted.  [ ] Due to altered mental status/intubation, subjective information were not able to be obtained from the patient. History was obtained, to the extent possible, from review of the chart and collateral sources of information.    NEURO  Exam: awake, alert, oriented x4, no acute distress, no acute focal deficits  Meds: HYDROmorphone  Injectable 0.5 milliGRAM(s) IV Push every 3 hours PRN Severe Pain (7 - 10)  [x] Adequacy of sedation and pain control has been assessed and adjusted    RESPIRATORY  RR: 30 (02-12-20 @ 06:00) (16 - 33)  SpO2: 95% (02-12-20 @ 06:00) (91% - 97%)  Exam: clear to auscultation bilaterally  Mechanical Ventilation: no  [N/A] Extubation Readiness Assessed  Meds:  - albuterol/ipratropium for Nebulization. 3 milliLiter(s) Nebulizer every 6 hours  - buDESOnide    Inhalation Suspension 0.5 milliGRAM(s) Inhalation every 12 hours  - guaiFENesin  milliGRAM(s) Oral every 12 hours    CARDIOVASCULAR  HR: 103 (02-12-20 @ 06:00) (87 - 105)  BP: 134/65 (02-12-20 @ 06:00) (117/57 - 174/81)  BP(mean): 91 (02-12-20 @ 06:00) (82 - 116)  Exam: regular rate and rhythm, S1S2  Cardiac Rhythm: sinus  Perfusion    [x]Adequate    [ ]Inadequate  Mentation   [x]Normal       [ ]Reduced  Extremities  [x]Warm         [ ]Cool  Volume Status [ ]Hypervolemic [x]Euvolemic [ ]Hypovolemic  Meds: none    GI/NUTRITION  Exam: soft, nondistended, sawyer-incisional tenderness, incision dressing C/D/I  Diet: NPO with NG tube to continuous low wall suction  Meds:   - fat emulsion (Fish Oil and Plant Based) 20% Infusion 20.8 mL/Hr IV Continuous <Continuous>  - pantoprazole Infusion 8 mG/Hr IV Continuous <Continuous>  - Parenteral Nutrition - Adult 1 Each TPN Continuous <Continuous>    GENITOURINARY  I&O's Detail    02-11 @ 07:01  -  02-12 @ 07:00  --------------------------------------------------------  IN:    fat emulsion (Fish Oil and Plant Based) 20% Infusion: 291.2 mL    Oral Fluid: 1440 mL    pantoprazole Infusion: 240 mL    TPN (Total Parenteral Nutrition): 1992 mL  Total IN: 3963.2 mL    OUT:    Voided: 2325 mL  Total OUT: 2325 mL    Total NET: 1638.2 mL    131<L>  |  99  |  16  ----------------------------<  107<H>  4.1   |  23  |  0.38<L>    Ca    8.2<L>      12 Feb 2020 00:20  Phos  3.4     02-12  Mg     1.7     02-12    TPro  6.4  /  Alb  2.6<L>  /  TBili  0.6  /  DBili  0.2  /  AST  44<H>  /  ALT  82<H>  /  AlkPhos  110  02-12    [ ] Martinez catheter, indication: N/A  Meds: tamsulosin 0.4 milliGRAM(s) Oral at bedtime    HEMATOLOGIC  Meds: none  [x] VTE Prophylaxis                        8.7    14.93 )-----------( 384      ( 12 Feb 2020 00:20 )             27.4     PT/INR - ( 12 Feb 2020 00:20 )   PT: 13.3 sec;   INR: 1.16 ratio    PTT - ( 12 Feb 2020 00:20 )  PTT:36.4 sec    INFECTIOUS DISEASES  T(C): 37 (02-12-20 @ 03:00), Max: 37.4 (02-11-20 @ 23:00)  WBC Count:  - 14.93 K/uL (02-12 @ 00:20)  - 10.80 K/uL (02-11 @ 14:09)  - 10.58 K/uL (02-11 @ 08:27)  Recent Cultures:  - Specimen Source: .Urine Clean Catch (Midstream), 02-07 @ 09:40  Results: No growth  Gram Stain: --  Organism: --  Meds: none    ENDOCRINE  Capillary Blood Glucose: none  Meds: none    ACCESS DEVICES:  [x] Peripheral IV  [ ] Central Venous Line	[ ] R	[ ] L	[ ] IJ	[ ] Fem	[ ] SC	Placed:   [ ] Arterial Line		[ ] R	[ ] L	[ ] Fem	[ ] Rad	[ ] Ax	Placed:   [x] PICC:	RUE placed on 1/21		[ ] Mediport  [ ] Urinary Catheter, Date Placed:   [x] Necessity of urinary, arterial, and venous catheters discussed    OTHER MEDICATIONS:  - benzocaine 15 mG/menthol 3.6 mG (Sugar-Free) Lozenge 1 Lozenge Oral every 4 hours PRN  - chlorhexidine 2% Cloths 1 Application(s) Topical <User Schedule>  - ergocalciferol 85132 Unit(s) Oral every week  - nystatin/triamcinolone Cream 1 Application(s) Topical two times a day  - triamcinolone 0.1% Ointment 1 Application(s) Topical two times a day    CODE STATUS: Full code    IMAGING:

## 2020-02-12 NOTE — PROGRESS NOTE ADULT - PROBLEM SELECTOR PLAN 5
s/p repeat  PRBC transfusion   PPI gtt   Likely For EGD today   Acceptable cardiac risk to proceed   GI following

## 2020-02-12 NOTE — PROGRESS NOTE ADULT - ASSESSMENT
76 y/o M presenting with septic shock and high grade SBO s/p exploratory laparotomy, lysis of adhesions, decompression of bowel via enterotomy w/ primary repair, and Abthera VAC placement on 12/6; s/p take down of Abthera, washout and re-application of the Abthera vac on 12/8. 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 s/p L chest tube placement by IR 12/30 for pleural effusion now s/p VATS, with chest tube insertion for drainage of pleural effusion. RRT called 1/13 AM for hypoxia, patient transferred back to SICU.  Patient continued SOB, chest drain possible obstruction expanding. Patent taken back to OR emergently 1/15 for clot evac and VATS. Patient is now s/p Ileostomy reversal 2/4/20. Post-op c/b bloody BM and anemia requiring blood transfusion, now improved.    PLAN:  - GI to do EGD/colonoscopy today  - F/U H/H  - NPO  - Monitor BM frequency and appearance  - continue TPN, will calorie count once on regular diet  - OOB    [facility]  [id number]

## 2020-02-12 NOTE — PROGRESS NOTE ADULT - ASSESSMENT
A/P: 75 year old male w/ PMH of COPD and EtOH dependence with PSH/o appy, prostate surgery, & spine surgery  septic shock and high grade SBO s/p exploratory laparotomy, lysis of adhesions, decompression of bowel via enterotomy w/ primary repair, and Abthera VAC placement on 12/6; s/p take down of Abthera, washout and re-application of the Abthera vac on 12/8. Now s/p SBR and end ileostomy on 12/10.   TPN consulted to assist w/ management of pt's nutrition in pt w/ prolonged hospital course now tolerating diet but has high Ileostomy output.  Pt s/p Lt Chest Pigtail for effusion in IR with persistent high output on 1/10/2020 pt had VATs & placement of Left PleurX catheter. Pt op pt developed hemothorax and Respiratory Distress.  Pt is s/p Lt chest Evacuation of 2L blood w/ placement of CT x2 on 1/15/2020 for Lt Pleural Effusion that's resolving and doing well after CT removed and resolved Rt PNA vs Pleural Effusion.   Pt s/p Ex Lap, MIRYAM, & Closure of End Ileostomy on 2/4/2020.    Pt's made NPO for EGD / Colonoscopy today by GI  Pt w/ improving severe Protein-Calorie Malnutrition-         On going monitoring post op of  Short Gut Syndrome w/Malabsorption  TPN at GOAL:  Amino Acids 120g, Dextrose 210g, and Lipids 50g in 2000mL with 3mL MTE & 10mL MVI          -GIB -stable H&H  for EGD & Colonoscopy today w/ GI         Protonix gtt  -HypoCa- improving w/ increased Ca in TPN to 14mEq- continue to monitor        Improved Ca levels helpful for BP & muscle contraction  -LowK - improving w/ 80mEq KCl in TPN, continue to monitor        maintaining K & Mg will help with GI motility   -LowMg- improving with increased Mg to 16mEq         continue to monitor as pt on Protonix gtt  -HypoPhos- improving w/-increased NaPhos & will continue to monitor   -Fecal fat testing suggestive of decreased absorption of fat -        will monitor for improved absorption with improving GI function        TPN w/ MVI to compensate for low Vit E & A        Vit D levels low- being supplemented w/Ergocalciferol         Vit K INR/ PT near normal suggestive that it is being absorbed   -Strict Intake and Output - continue to closely monitor         TPN w/ increased oral fluid intake (CLD & Bowel prep), and                 2325mL urine output & 8 loose/watery BMs  -Good glycemic control-  Fingersticks & ISS coverage - as per SICU  -Weights three times a week  -Daily CMP, Mg, Ionized Ca, Phosphorus,        and Weekly Triglycerides and Pre-albumin  Continue as per SICU/Surgery, will follow with you, D/w primary team    Andreina Hubbard PA-C  TPN team, pager [phone]  D/w Ana M Chacko & MU Siegel A/P: 75 year old male w/ PMH of COPD and EtOH dependence with PSH/o appy, prostate surgery, & spine surgery  septic shock and high grade SBO s/p exploratory laparotomy, lysis of adhesions, decompression of bowel via enterotomy w/ primary repair, and Abthera VAC placement on 12/6; s/p take down of Abthera, washout and re-application of the Abthera vac on 12/8. Now s/p SBR and end ileostomy on 12/10.   TPN initally consulted to assist w/ management of pt's nutrition in pt with SGS/malabsorption and had high Ileostomy output.  Pt s/p Lt Chest Pigtail for effusion in IR with persistent high output on 1/10/2020. Pt had VATs & placement of Left PleurX catheter. Post op pt developed hemothorax and Respiratory Distress.  Pt s/p Lt chest Evacuation of 2L blood w/ placement of CT x2 on 1/15/2020.  Lt Pleural Effusion resolved and pt doing well after CT removed.  Pt also noted with Rt PNA vs Pleural Effusion that has resolved.  Pt is s/p Ex Lap, MIRYAM, & Closure of End Ileostomy on 2/4/2020.    Pt's made NPO for EGD / Colonoscopy today by GI for GIB  Pt w/ improving severe Protein-Calorie Malnutrition-         On going monitoring post op of  Short Gut Syndrome w/Malabsorption  TPN at GOAL:  Amino Acids 120g, Dextrose 210g, and Lipids 50g in 2000mL with 3mL MTE & 10mL MVI          -GIB -stable H&H  for EGD & Colonoscopy today w/ GI         Protonix gtt  -HypoCa- improving w/ increased Ca in TPN to 14mEq- continue to monitor        Improved Ca levels helpful for BP & muscle contraction  -LowK - improving w/ 80mEq KCl in TPN, continue to monitor        maintaining K & Mg will help with GI motility   -LowMg- improving with increased Mg to 16mEq         continue to monitor as pt on Protonix gtt  -HypoPhos- improving w/-increased NaPhos & will continue to monitor   -Fecal fat testing suggestive of decreased absorption of fat - while pt had ileostomy            will monitor for improved absorption after ileostomy reversal         TPN w/ MVI to compensate for low Vit E & A        Vit D levels low- being supplemented w/Ergocalciferol         Vit K INR/ PT near normal suggestive that it is being absorbed   -Strict Intake and Output - continue to closely monitor         TPN w/ increased oral fluid intake (CLD & Bowel prep), and                 2325mL urine output & 8 loose/watery BMs  -Good glycemic control-  Fingersticks & ISS coverage - as per SICU  -Weights three times a week  -Daily CMP, Mg, Ionized Ca, Phosphorus,        and Weekly Triglycerides and Pre-albumin  Continue as per SICU/Surgery, will follow with you, D/w primary team    Andreina Hubbard PA-C  TPN team, pager [phone]  D/w Ana M Chacko & MU Siegel

## 2020-02-13 LAB
ALBUMIN SERPL ELPH-MCNC: 2.4 G/DL — LOW (ref 3.3–5)
ALP SERPL-CCNC: 89 U/L — SIGNIFICANT CHANGE UP (ref 40–120)
ALT FLD-CCNC: 55 U/L — HIGH (ref 10–45)
ANION GAP SERPL CALC-SCNC: 9 MMOL/L — SIGNIFICANT CHANGE UP (ref 5–17)
APTT BLD: 36.5 SEC — HIGH (ref 27.5–36.3)
AST SERPL-CCNC: 22 U/L — SIGNIFICANT CHANGE UP (ref 10–40)
BILIRUB DIRECT SERPL-MCNC: 0.2 MG/DL — SIGNIFICANT CHANGE UP (ref 0–0.2)
BILIRUB INDIRECT FLD-MCNC: 0.2 MG/DL — SIGNIFICANT CHANGE UP (ref 0.2–1)
BILIRUB SERPL-MCNC: 0.4 MG/DL — SIGNIFICANT CHANGE UP (ref 0.2–1.2)
BUN SERPL-MCNC: 14 MG/DL — SIGNIFICANT CHANGE UP (ref 7–23)
CA-I BLD-SCNC: 1.11 MMOL/L — LOW (ref 1.12–1.3)
CALCIUM SERPL-MCNC: 7.8 MG/DL — LOW (ref 8.4–10.5)
CHLORIDE SERPL-SCNC: 101 MMOL/L — SIGNIFICANT CHANGE UP (ref 96–108)
CO2 SERPL-SCNC: 24 MMOL/L — SIGNIFICANT CHANGE UP (ref 22–31)
CREAT SERPL-MCNC: 0.4 MG/DL — LOW (ref 0.5–1.3)
GLUCOSE SERPL-MCNC: 114 MG/DL — HIGH (ref 70–99)
HCT VFR BLD CALC: 24.8 % — LOW (ref 39–50)
HGB BLD-MCNC: 7.8 G/DL — LOW (ref 13–17)
INR BLD: 1.29 RATIO — HIGH (ref 0.88–1.16)
MAGNESIUM SERPL-MCNC: 1.8 MG/DL — SIGNIFICANT CHANGE UP (ref 1.6–2.6)
MCHC RBC-ENTMCNC: 29.7 PG — SIGNIFICANT CHANGE UP (ref 27–34)
MCHC RBC-ENTMCNC: 31.5 GM/DL — LOW (ref 32–36)
MCV RBC AUTO: 94.3 FL — SIGNIFICANT CHANGE UP (ref 80–100)
NRBC # BLD: 0 /100 WBCS — SIGNIFICANT CHANGE UP (ref 0–0)
PHOSPHATE SERPL-MCNC: 3.5 MG/DL — SIGNIFICANT CHANGE UP (ref 2.5–4.5)
PLATELET # BLD AUTO: 347 K/UL — SIGNIFICANT CHANGE UP (ref 150–400)
POTASSIUM SERPL-MCNC: 4.1 MMOL/L — SIGNIFICANT CHANGE UP (ref 3.5–5.3)
POTASSIUM SERPL-SCNC: 4.1 MMOL/L — SIGNIFICANT CHANGE UP (ref 3.5–5.3)
PROT SERPL-MCNC: 5.9 G/DL — LOW (ref 6–8.3)
PROTHROM AB SERPL-ACNC: 15 SEC — HIGH (ref 10–12.9)
RBC # BLD: 2.63 M/UL — LOW (ref 4.2–5.8)
RBC # FLD: 14.8 % — HIGH (ref 10.3–14.5)
SODIUM SERPL-SCNC: 134 MMOL/L — LOW (ref 135–145)
WBC # BLD: 13.37 K/UL — HIGH (ref 3.8–10.5)
WBC # FLD AUTO: 13.37 K/UL — HIGH (ref 3.8–10.5)

## 2020-02-13 PROCEDURE — 99232 SBSQ HOSP IP/OBS MODERATE 35: CPT

## 2020-02-13 PROCEDURE — 99233 SBSQ HOSP IP/OBS HIGH 50: CPT

## 2020-02-13 PROCEDURE — 99232 SBSQ HOSP IP/OBS MODERATE 35: CPT | Mod: GC

## 2020-02-13 RX ORDER — PANTOPRAZOLE SODIUM 20 MG/1
40 TABLET, DELAYED RELEASE ORAL DAILY
Refills: 0 | Status: DISCONTINUED | OUTPATIENT
Start: 2020-02-13 | End: 2020-02-13

## 2020-02-13 RX ORDER — MAGNESIUM SULFATE 500 MG/ML
2 VIAL (ML) INJECTION ONCE
Refills: 0 | Status: COMPLETED | OUTPATIENT
Start: 2020-02-13 | End: 2020-02-13

## 2020-02-13 RX ORDER — I.V. FAT EMULSION 20 G/100ML
20.8 EMULSION INTRAVENOUS
Qty: 50 | Refills: 0 | Status: DISCONTINUED | OUTPATIENT
Start: 2020-02-13 | End: 2020-02-14

## 2020-02-13 RX ORDER — PANTOPRAZOLE SODIUM 20 MG/1
40 TABLET, DELAYED RELEASE ORAL DAILY
Refills: 0 | Status: DISCONTINUED | OUTPATIENT
Start: 2020-02-13 | End: 2020-02-27

## 2020-02-13 RX ORDER — CALCIUM GLUCONATE 100 MG/ML
2 VIAL (ML) INTRAVENOUS ONCE
Refills: 0 | Status: COMPLETED | OUTPATIENT
Start: 2020-02-13 | End: 2020-02-13

## 2020-02-13 RX ORDER — ACETAMINOPHEN 500 MG
975 TABLET ORAL EVERY 6 HOURS
Refills: 0 | Status: DISCONTINUED | OUTPATIENT
Start: 2020-02-13 | End: 2020-02-27

## 2020-02-13 RX ORDER — ELECTROLYTE SOLUTION,INJ
1 VIAL (ML) INTRAVENOUS
Refills: 0 | Status: DISCONTINUED | OUTPATIENT
Start: 2020-02-13 | End: 2020-02-13

## 2020-02-13 RX ORDER — OXYCODONE HYDROCHLORIDE 5 MG/1
5 TABLET ORAL EVERY 4 HOURS
Refills: 0 | Status: DISCONTINUED | OUTPATIENT
Start: 2020-02-13 | End: 2020-02-20

## 2020-02-13 RX ADMIN — PANTOPRAZOLE SODIUM 40 MILLIGRAM(S): 20 TABLET, DELAYED RELEASE ORAL at 06:15

## 2020-02-13 RX ADMIN — Medication 50 GRAM(S): at 06:14

## 2020-02-13 RX ADMIN — Medication 600 MILLIGRAM(S): at 18:10

## 2020-02-13 RX ADMIN — Medication 975 MILLIGRAM(S): at 22:34

## 2020-02-13 RX ADMIN — Medication 12.5 MILLIGRAM(S): at 06:14

## 2020-02-13 RX ADMIN — Medication 0.5 MILLIGRAM(S): at 05:30

## 2020-02-13 RX ADMIN — TAMSULOSIN HYDROCHLORIDE 0.4 MILLIGRAM(S): 0.4 CAPSULE ORAL at 22:12

## 2020-02-13 RX ADMIN — OXYCODONE HYDROCHLORIDE 5 MILLIGRAM(S): 5 TABLET ORAL at 18:21

## 2020-02-13 RX ADMIN — Medication 1 APPLICATION(S): at 18:10

## 2020-02-13 RX ADMIN — HYDROMORPHONE HYDROCHLORIDE 0.5 MILLIGRAM(S): 2 INJECTION INTRAMUSCULAR; INTRAVENOUS; SUBCUTANEOUS at 00:55

## 2020-02-13 RX ADMIN — Medication 600 MILLIGRAM(S): at 06:14

## 2020-02-13 RX ADMIN — Medication 975 MILLIGRAM(S): at 23:05

## 2020-02-13 RX ADMIN — OXYCODONE HYDROCHLORIDE 5 MILLIGRAM(S): 5 TABLET ORAL at 22:33

## 2020-02-13 RX ADMIN — Medication 1 EACH: at 18:10

## 2020-02-13 RX ADMIN — CHLORHEXIDINE GLUCONATE 1 APPLICATION(S): 213 SOLUTION TOPICAL at 06:14

## 2020-02-13 RX ADMIN — I.V. FAT EMULSION 20.8 ML/HR: 20 EMULSION INTRAVENOUS at 18:10

## 2020-02-13 RX ADMIN — HYDROMORPHONE HYDROCHLORIDE 0.5 MILLIGRAM(S): 2 INJECTION INTRAMUSCULAR; INTRAVENOUS; SUBCUTANEOUS at 00:38

## 2020-02-13 RX ADMIN — Medication 1 APPLICATION(S): at 06:16

## 2020-02-13 RX ADMIN — Medication 200 GRAM(S): at 05:45

## 2020-02-13 RX ADMIN — OXYCODONE HYDROCHLORIDE 5 MILLIGRAM(S): 5 TABLET ORAL at 18:55

## 2020-02-13 RX ADMIN — OXYCODONE HYDROCHLORIDE 5 MILLIGRAM(S): 5 TABLET ORAL at 23:05

## 2020-02-13 RX ADMIN — ENOXAPARIN SODIUM 40 MILLIGRAM(S): 100 INJECTION SUBCUTANEOUS at 12:55

## 2020-02-13 RX ADMIN — Medication 3 MILLILITER(S): at 12:10

## 2020-02-13 RX ADMIN — Medication 1 APPLICATION(S): at 06:15

## 2020-02-13 RX ADMIN — Medication 3 MILLILITER(S): at 05:30

## 2020-02-13 RX ADMIN — Medication 1 APPLICATION(S): at 18:22

## 2020-02-13 RX ADMIN — Medication 12.5 MILLIGRAM(S): at 18:10

## 2020-02-13 NOTE — PROGRESS NOTE ADULT - SUBJECTIVE AND OBJECTIVE BOX
Follow-up Pulm Progress Note    No new respiratory events overnight.  Denies SOB/CP.   97% on RA    Medications:  MEDICATIONS  (STANDING):  albuterol/ipratropium for Nebulization. 3 milliLiter(s) Nebulizer every 6 hours  buDESOnide    Inhalation Suspension 0.5 milliGRAM(s) Inhalation every 12 hours  chlorhexidine 2% Cloths 1 Application(s) Topical <User Schedule>  enoxaparin Injectable 40 milliGRAM(s) SubCutaneous daily  ergocalciferol 19679 Unit(s) Oral every week  fat emulsion (Fish Oil and Plant Based) 20% Infusion 20.8 mL/Hr (20.8 mL/Hr) IV Continuous <Continuous>  guaiFENesin  milliGRAM(s) Oral every 12 hours  metoprolol tartrate 12.5 milliGRAM(s) Oral every 12 hours  nystatin/triamcinolone Cream 1 Application(s) Topical two times a day  pantoprazole   Suspension 40 milliGRAM(s) Oral daily  Parenteral Nutrition - Adult 1 Each (83 mL/Hr) TPN Continuous <Continuous>  Parenteral Nutrition - Adult 1 Each (83 mL/Hr) TPN Continuous <Continuous>  tamsulosin 0.4 milliGRAM(s) Oral at bedtime  triamcinolone 0.1% Ointment 1 Application(s) Topical two times a day    MEDICATIONS  (PRN):  acetaminophen   Tablet .. 975 milliGRAM(s) Oral every 6 hours PRN Mild Pain (1 - 3)  benzocaine 15 mG/menthol 3.6 mG (Sugar-Free) Lozenge 1 Lozenge Oral every 4 hours PRN Sore Throat  oxyCODONE    IR 5 milliGRAM(s) Oral every 4 hours PRN Moderate Pain (4 - 6)          Vital Signs Last 24 Hrs  T(C): 37 (13 Feb 2020 12:00), Max: 37.4 (13 Feb 2020 03:00)  T(F): 98.6 (13 Feb 2020 12:00), Max: 99.3 (13 Feb 2020 03:00)  HR: 96 (13 Feb 2020 14:00) (83 - 103)  BP: 144/76 (13 Feb 2020 14:00) (105/58 - 172/79)  BP(mean): 104 (13 Feb 2020 14:00) (78 - 126)  RR: 25 (13 Feb 2020 14:00) (19 - 33)  SpO2: 86% (13 Feb 2020 14:00) (82% - 99%) Isai          02-12 @ 07:01  -  02-13 @ 07:00  --------------------------------------------------------  IN: 2512.4 mL / OUT: 1950 mL / NET: 562.4 mL          LABS:                        7.8    13.37 )-----------( 347      ( 13 Feb 2020 02:20 )             24.8     02-13    134<L>  |  101  |  14  ----------------------------<  114<H>  4.1   |  24  |  0.40<L>    Ca    7.8<L>      13 Feb 2020 02:20  Phos  3.5     02-13  Mg     1.8     02-13    TPro  5.9<L>  /  Alb  2.4<L>  /  TBili  0.4  /  DBili  0.2  /  AST  22  /  ALT  55<H>  /  AlkPhos  89  02-13          CAPILLARY BLOOD GLUCOSE        PT/INR - ( 13 Feb 2020 02:20 )   PT: 15.0 sec;   INR: 1.29 ratio         PTT - ( 13 Feb 2020 02:20 )  PTT:36.5 sec                    CULTURES: (if applicable)  Culture Results:   No growth (02-07 @ 09:40)          Physical Examination:  PULM: Diminished BS bilaterally   CVS: S1, S2 heard    RADIOLOGY REVIEWED  CXR: L costophrenic angle off film   L basilar atelectasis

## 2020-02-13 NOTE — PROGRESS NOTE ADULT - ASSESSMENT
ASSESSMENT:  75 y/o male presenting with high grade SBO s/p exploratory laparotomy, lysis of adhesions, decompression of bowel via enterotomy w/ primary repair, & Abthera VAC placement (12/06/2019); RTOR for re-exploration w/ Abthera VAC replacement (12/08/2019) to allow for demarcation of ischemic small bowel; RTOR for re-exploration, small bowel resection of 150 cm (150 cm remaining), ileocecetomy, end ileostomy, mucous fistula, and abdominal closure (12/10/2019); hospital course complicated by SVT, short bowel syndrome requiring repletions w/ IV fluids, malnutrition requiring TPN, intermittent episodes of hypotension, spontaneous left pneumothorax s/p pigtail catheter (12/15/2019-12/24/2019), left pleural effusion s/p pigtail catheter w/ IR (12/30/2019) & subsequent placement of Pleur-X catheter (1/10/2020) c/b left hemothorax s/p left VATS, dysphagia, and oral HSV lesions; now s/p ileostomy reversal    PLAN:    Neuro: acute post-op pain  - Monitor mental status  - Pain control as needed with IV acetaminophen and Dilaudid    Resp: COPD, spontaneous left pneumothorax s/p pigtail catheter, left pleural effusion s/p Pleur-X catheter c/b hemothorax s/p left VATS  - Monitor pulse oximeter  - Pulmicort and Duoneb for COPD  - Guaifenesin as needed for cough  - Will need outpatient follow-up with a pulmonologist for his COPD as patient does not currently have one    CV: SVT  - Monitor vital signs    GI: s/p exploratory laparotomy, Grecia, decompression of bowel via enterotomy w/ primary repair, & Abthera VAC placement (12/06/2019); re-exploration w/ ABthera VAC replacement (12/08/2019); s/p re-exploration, small bowel resection of 150 cm (150 cm remaining), ileocecectomy end ileostomy, mucous fistula, & abdominal closure (12/10/2019); s/p ileostomy reversal (2/4/2020); short bowel syndrome, malnutrition, dysphagia, melena  - CLD and TPN  - s/p EGD and colonoscopy with GI  - Protonix BID    Renal: urinary retention, polyuria (resolved)  - Monitor I&Os  - Monitor electrolytes and replete as necessary  - Flomax for urinary retention    Heme: anemia likely secondary to malnutrition / malabsorption  - Monitor CBC and coags  - Venodynes for mechanical VTE prophylaxis, holding chemical VTE prophylaxis in the setting of GI bleeding    ID: oral HSV lesions (s/p acyclovir 12/23/2019-01/02/2020), Pseudomonas PNA (s/p meropenem 1/19/2020-1/26/2020)  - Monitor for clinical evidence of active infection    Endo: no acute issues  - Monitor glucose on BMP    Disposition:  - Full code  - Will remain in SICU        Kannan Hensley, PGY 2  [phone]

## 2020-02-13 NOTE — PROGRESS NOTE ADULT - SUBJECTIVE AND OBJECTIVE BOX
SICU Daily Progress Note  =====================================================  Interval/Overnight Events:      - Taken for EGD and colonoscopy by GI: EGD noted hiatal hernia, friable mucosa, and stomach nodules which were biopsied. Colonoscopy wnl. Friable mucosa around iliocolic anastomotic site.     HPI:  74 y/o male with a PMHx of COPD and EtOH dependence who presented on 12/6/2019 with abdominal pain, nausea, hematemesis, and poor PO intake for ~2-3 days. In the ED, he was found to be hypotensive & tachycardic. Labs revealed an CHI and lactic acidosis. Imaging revealed a high grade SBO in the RLQ on CT scan. Hospital course is as follows:  12/06 - s/p exploratory laparotomy, lysis of adhesions, decompression of bowel via enterotomy w/ primary repair, and Abthera VAC placement as the distal 50% of the bowel appeared dusky but was still viable, admitted to SICU post-operatively as he was on vasopressor support and was left intubated  12/08 - s/p re-exploration, proximal 165 cm of small bowel appeared pink & viable but beyond that had patchy areas of ischemia so decision was made to give the bowel more time to demarcate before resecting in order to preserve as much small bowel as possible so Abthera VAC was replaced  12/09 - s/p re-exploration, small bowel resection of 150 cm (150 cm remaining), ileocecetomy, end ileostomy, mucous fistula, and abdominal closure  12/11 - extubated to BiPAP, noted to be in SVT that was rate controlled w/ metoprolol  12/14 - started on TPN  12/15 - noted to have spontaneous left pneumothorax s/p left pigtail catheter placement, noted to be in SVT again so amiodarone started, high ileostomy output noted so concern for short bowel syndrome  12/16 - amiodarone discontinued, started on beta blocker with metoprolol  12/18 - started Lomotil and ileostomy repletions with IV fluids  12/19 - started Imodium, left pigtail catheter was placed to water seal but pneumothorax noted on follow-up CXR so placed back to suction  12/20 - started tincture of opium, concern for aspiration so changed diet from regular to dysphagia 1 pureed with nectar-thickened liquids  12/22 - left pigtail catheter placed to water seal with no pneumothorax noted on follow-up CXR, CT chest with moderate left pleural effusion not being drained by pigtail catheter  12/23 - 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  12/24 - left pigtail catheter discontinued  12/28 - started Ancef for erythematous midline wound  12/29 - beta blocker discontinued for intermittent episodes of hypotension  12/30 - left pigtail catheter placement by IR with drainage of serous transudative fluid  01/02 - FEES demonstrating penetration with thin liquids so patient kept on dysphagia 1 pureed with nectar-thickened liquids  01/07 - transferred to floors  01/10 - s/p left VATS w/ PleurX catheter placement  01/11 - evaluated by speech & swallow, advanced diet to mechanical soft with thin liquids  01/12 - PleurX catheter placed to water seal with on pneumothorax on follow-up CXR  01/13 - RRT for hypoxemia, placed on BiPAP, PleurX catheter placed back to suction, started vancomycin & Zosyn for possible HCAP  01/14 - noted to have minimal output from PleurX catheter, lung ultrasound with large left pleural effusion concerning for hemothorax, multiple episodes of bradycardia secondary to hypoxemia, remains on BiPAP  01/15 - worsening respiratory distress requiring intubation, hypotensive requiring vasopressor support, CT chest with large left hemothorax w/ trace pneumothorax & extensive subcutaneous emphysema so taken to OR emergently for left VATS, evacuation fo 2 L of clot, and placement of two left chest tubes  01/16 - extubated, weaned off vasopressor support  01/17 - two left chest tubes placed to water seal with no pneumothorax noted on follow-up CXR  01/22 - inferior chest tube discontinued with no pneumothorax noted on follow-up CXR, polyuria noted  01/24 - given DDAVP for polyuria w/ good response  01/25 - superior chest tube discontinued with no pneumothorax noted on follow-up CXR  02/04 - s/p exploratory laparotomy, lysis of adhesions, and ileostomy reversal  02/05 - episode of melena but was hemodynamically stable and H&H was stable, likely due to oozing from anastomosis  02/06 - NG tube d/cherie, advanced to sips  02/08 - multiple episodes of melena and H&H drop requiring blood, started Protonix infusion, GI consulted  02/09 - continues to have melena, stopped VTE prophylaxis  02/12 - EGD and colonoscopy: EGD noted hiatal hernia, friable mucosa, and stomach nodules which were biopsied. Colonoscopy wnl. Friable mucosa around iliocolic anastomotic site.     Allergies: IV Contrast (Hives)          MEDICATIONS:   --------------------------------------------------------------------------------------  Neurologic Medications  HYDROmorphone  Injectable 0.5 milliGRAM(s) IV Push every 3 hours PRN Severe Pain (7 - 10)    Respiratory Medications  albuterol/ipratropium for Nebulization. 3 milliLiter(s) Nebulizer every 6 hours  buDESOnide    Inhalation Suspension 0.5 milliGRAM(s) Inhalation every 12 hours  guaiFENesin  milliGRAM(s) Oral every 12 hours    Cardiovascular Medications  metoprolol tartrate 12.5 milliGRAM(s) Oral every 12 hours  tamsulosin 0.4 milliGRAM(s) Oral at bedtime    Gastrointestinal Medications  ergocalciferol 94151 Unit(s) Oral every week  fat emulsion (Fish Oil and Plant Based) 20% Infusion 20.8 mL/Hr IV Continuous <Continuous>  pantoprazole  Injectable 40 milliGRAM(s) IV Push every 12 hours  Parenteral Nutrition - Adult 1 Each TPN Continuous <Continuous>    Genitourinary Medications    Hematologic/Oncologic Medications  enoxaparin Injectable 40 milliGRAM(s) SubCutaneous daily    Antimicrobial/Immunologic Medications    Endocrine/Metabolic Medications    Topical/Other Medications  benzocaine 15 mG/menthol 3.6 mG (Sugar-Free) Lozenge 1 Lozenge Oral every 4 hours PRN Sore Throat  chlorhexidine 2% Cloths 1 Application(s) Topical <User Schedule>  nystatin/triamcinolone Cream 1 Application(s) Topical two times a day  triamcinolone 0.1% Ointment 1 Application(s) Topical two times a day    --------------------------------------------------------------------------------------    VITAL SIGNS, INS/OUTS (last 24 hours):  --------------------------------------------------------------------------------------  ICU Vital Signs Last 24 Hrs  T(C): 37.3 (12 Feb 2020 23:00), Max: 37.3 (12 Feb 2020 19:00)  T(F): 99.1 (12 Feb 2020 23:00), Max: 99.1 (12 Feb 2020 19:00)  HR: 97 (13 Feb 2020 01:00) (83 - 103)  BP: 136/63 (13 Feb 2020 01:00) (111/58 - 165/99)  BP(mean): 91 (13 Feb 2020 01:00) (80 - 126)  ABP: --  ABP(mean): --  RR: 23 (13 Feb 2020 01:00) (18 - 33)  SpO2: 95% (13 Feb 2020 01:00) (92% - 99%)    --------------------------------------------------------------------------------------    EXAM  NEUROLOGY  RASS:  0 	GCS:  15  Exam: Normal, NAD, alert, oriented x3, no focal deficits.     RESPIRATORY  Exam: Nonlabored, normal respiratory effort.     CARDIOVASCULAR  Exam: Normotensive, RRR    GI/NUTRITION  Exam: Abdomen soft, NT/ND, no rebound or guarding.  Current Diet:  CLD    VASCULAR  Exam: Extremities warm, well-perfused. Adequate capillary refill.     HEMATOLOGIC  [x] VTE Prophylaxis: enoxaparin Injectable 40 milliGRAM(s) SubCutaneous daily        INFECTIOUS DISEASE  Antimicrobials/Immunologic Medications:      Tubes/Lines/Drains    [x] Peripheral IV  [] Central Venous Line     	[] R	[] L	[] IJ	[] Fem	[] SC	Date Placed:   [] Arterial Line		[] R	[] L	[] Fem	[] Rad	[] Ax	Date Placed:   [X] PICC		[] Midline		[] Mediport  [] Urinary Catheter		  [x] Necessity of urinary, arterial, and venous catheters discussed    LABS  --------------------------------------------------------------------------------------  CBC (02-12 @ 20:39)                              8.2<L>                         13.35<H>  )----------------(  375        --    % Neutrophils, --    % Lymphocytes, ANC: --                                  26.0<L>              CBC (02-12 @ 08:30)                              7.9<L>                         12.41<H>  )----------------(  316        --    % Neutrophils, --    % Lymphocytes, ANC: --                                  24.6<L>                BMP (02-12 @ 01:04)             --      |  --      |  --    		Ca++ 1.12    Ca --                 ---------------------------------( --    		Mg --                 --      |  --      |  --    			Ph --      BMP (02-12 @ 00:20)             131<L>  |  99      |  16    		Ca++ --      Ca 8.2<L>             ---------------------------------( 107<H>		Mg 1.7                4.1     |  23      |  0.38<L>			Ph 3.4       LFTs (02-12 @ 00:20)      TPro 6.4 / Alb 2.6<L> / TBili 0.6 / DBili 0.2 / AST 44<H> / ALT 82<H> / AlkPhos 110    Coags (02-12 @ 00:20)  aPTT 36.4<H> / INR 1.16 / PT 13.3<H>        -> .Urine Clean Catch (Midstream) Culture (02-07 @ 09:40)     NG    NG    No growth      --------------------------------------------------------------------------------------

## 2020-02-13 NOTE — PROGRESS NOTE ADULT - SUBJECTIVE AND OBJECTIVE BOX
Chief Complaint:  Patient is a 75y old  Male who presents with a chief complaint of abd. pain (2020 01:39)      Interval Events:   EGD/Colonoscopy done yesterday  No active source of bleed found  Hb stable this AM  Vitals stable      Allergies:  IV Contrast (Hives)      Home Medications:    Hospital Medications:  albuterol/ipratropium for Nebulization. 3 milliLiter(s) Nebulizer every 6 hours  benzocaine 15 mG/menthol 3.6 mG (Sugar-Free) Lozenge 1 Lozenge Oral every 4 hours PRN  buDESOnide    Inhalation Suspension 0.5 milliGRAM(s) Inhalation every 12 hours  chlorhexidine 2% Cloths 1 Application(s) Topical <User Schedule>  enoxaparin Injectable 40 milliGRAM(s) SubCutaneous daily  ergocalciferol 80284 Unit(s) Oral every week  fat emulsion (Fish Oil and Plant Based) 20% Infusion 20.8 mL/Hr IV Continuous <Continuous>  guaiFENesin  milliGRAM(s) Oral every 12 hours  HYDROmorphone  Injectable 0.5 milliGRAM(s) IV Push every 3 hours PRN  metoprolol tartrate 12.5 milliGRAM(s) Oral every 12 hours  nystatin/triamcinolone Cream 1 Application(s) Topical two times a day  pantoprazole  Injectable 40 milliGRAM(s) IV Push every 12 hours  Parenteral Nutrition - Adult 1 Each TPN Continuous <Continuous>  tamsulosin 0.4 milliGRAM(s) Oral at bedtime  triamcinolone 0.1% Ointment 1 Application(s) Topical two times a day      PMHX/PSHX:  COPD with hypoxia  ETOHism  ETOH abuse  History of lumbosacral spine surgery  History of prostate surgery  History of appendectomy  S/P appendectomy      Family history:      ROS:     General:  No wt loss, fevers, chills, night sweats, fatigue,   Eyes:  Good vision, no reported pain  ENT:  No sore throat, pain, runny nose, dysphagia  CV:  No pain, palpitations, hypo/hypertension  Resp:  No dyspnea, cough, tachypnea, wheezing  GI:  No pain, No nausea, No vomiting, No diarrhea, No constipation, No weight loss, No fever, No pruritis, No rectal bleeding, No tarry stools, No dysphagia,  :  No pain, bleeding, incontinence, nocturia  Muscle:  No pain, weakness  Neuro:  No weakness, tingling, memory problems  Psych:  No fatigue, insomnia, mood problems, depression  Endocrine:  No polyuria, polydipsia, cold/heat intolerance  Heme:  No petechiae, ecchymosis, easy bruisability  Skin:  No rash, tattoos, scars, edema      PHYSICAL EXAM:   Vital Signs:  Vital Signs Last 24 Hrs  T(C): 37.4 (2020 03:00), Max: 37.4 (2020 03:00)  T(F): 99.3 (2020 03:00), Max: 99.3 (2020 03:00)  HR: 94 (2020 07:00) (83 - 103)  BP: 129/68 (2020 07:00) (105/58 - 165/99)  BP(mean): 93 (2020 07:00) (78 - 126)  RR: 24 (2020 07:00) (18 - 33)  SpO2: 93% (2020 07:00) (92% - 99%)  Daily     Daily Weight in k.3 (2020 00:00)    GENERAL:  frail, NAD  HEENT:  NC/AT,  conjunctivae clear and pink  CHEST:  Full & symmetric excursion, no increased effort  HEART:  Regular rhythm, no JVD  ABDOMEN:  Soft, non-tender, non-distended, surgical scars noted, normoactive bowel sounds  RECTAL: Maroon/dark stool  EXTREMITIES:  no cyanosis, clubbing or edema  SKIN:  No rash/erythema/ecchymoses/petechiae/wounds/abscess/warm/dry  NEURO:  Alert, oriented, nonfocal      LABS:                        7.8    13.37 )-----------( 347      ( 2020 02:20 )             24.8     02-13    134<L>  |  101  |  14  ----------------------------<  114<H>  4.1   |  24  |  0.40<L>    Ca    7.8<L>      2020 02:20  Phos  3.5     02-13  Mg     1.8     02-13    TPro  5.9<L>  /  Alb  2.4<L>  /  TBili  0.4  /  DBili  0.2  /  AST  22  /  ALT  55<H>  /  AlkPhos  89  02-13    LIVER FUNCTIONS - ( 2020 02:20 )  Alb: 2.4 g/dL / Pro: 5.9 g/dL / ALK PHOS: 89 U/L / ALT: 55 U/L / AST: 22 U/L / GGT: x           PT/INR - ( 2020 02:20 )   PT: 15.0 sec;   INR: 1.29 ratio         PTT - ( 2020 02:20 )  PTT:36.5 sec        Imaging: Chief Complaint:  Patient is a 75y old  Male who presents with a chief complaint of abd. pain (2020 01:39)      Interval Events:   EGD/Colonoscopy done yesterday  No active source of bleed found  Hb stable this AM  Vitals stable  No complaints this morning, denies any abdominal pain, melena or BRBpR      Allergies:  IV Contrast (Hives)      Home Medications:    Hospital Medications:  albuterol/ipratropium for Nebulization. 3 milliLiter(s) Nebulizer every 6 hours  benzocaine 15 mG/menthol 3.6 mG (Sugar-Free) Lozenge 1 Lozenge Oral every 4 hours PRN  buDESOnide    Inhalation Suspension 0.5 milliGRAM(s) Inhalation every 12 hours  chlorhexidine 2% Cloths 1 Application(s) Topical <User Schedule>  enoxaparin Injectable 40 milliGRAM(s) SubCutaneous daily  ergocalciferol 84703 Unit(s) Oral every week  fat emulsion (Fish Oil and Plant Based) 20% Infusion 20.8 mL/Hr IV Continuous <Continuous>  guaiFENesin  milliGRAM(s) Oral every 12 hours  HYDROmorphone  Injectable 0.5 milliGRAM(s) IV Push every 3 hours PRN  metoprolol tartrate 12.5 milliGRAM(s) Oral every 12 hours  nystatin/triamcinolone Cream 1 Application(s) Topical two times a day  pantoprazole  Injectable 40 milliGRAM(s) IV Push every 12 hours  Parenteral Nutrition - Adult 1 Each TPN Continuous <Continuous>  tamsulosin 0.4 milliGRAM(s) Oral at bedtime  triamcinolone 0.1% Ointment 1 Application(s) Topical two times a day      PMHX/PSHX:  COPD with hypoxia  ETOHism  ETOH abuse  History of lumbosacral spine surgery  History of prostate surgery  History of appendectomy  S/P appendectomy      Family history:      ROS:     General:  No wt loss, fevers, chills, night sweats, fatigue,   Eyes:  Good vision, no reported pain  ENT:  No sore throat, pain, runny nose, dysphagia  CV:  No pain, palpitations, hypo/hypertension  Resp:  No dyspnea, cough, tachypnea, wheezing  GI:  No pain, No nausea, No vomiting, No diarrhea, No constipation, No weight loss, No fever, No pruritis, No rectal bleeding, No tarry stools, No dysphagia,  :  No pain, bleeding, incontinence, nocturia  Muscle:  No pain, weakness  Neuro:  No weakness, tingling, memory problems  Psych:  No fatigue, insomnia, mood problems, depression  Endocrine:  No polyuria, polydipsia, cold/heat intolerance  Heme:  No petechiae, ecchymosis, easy bruisability  Skin:  No rash, tattoos, scars, edema      PHYSICAL EXAM:   Vital Signs:  Vital Signs Last 24 Hrs  T(C): 37.4 (2020 03:00), Max: 37.4 (2020 03:00)  T(F): 99.3 (2020 03:00), Max: 99.3 (2020 03:00)  HR: 94 (2020 07:00) (83 - 103)  BP: 129/68 (2020 07:00) (105/58 - 165/99)  BP(mean): 93 (2020 07:00) (78 - 126)  RR: 24 (2020 07:00) (18 - 33)  SpO2: 93% (2020 07:00) (92% - 99%)  Daily     Daily Weight in k.3 (2020 00:00)    GENERAL:  frail, NAD  HEENT:  NC/AT,  conjunctivae clear and pink  CHEST:  Full & symmetric excursion, no increased effort  HEART:  Regular rhythm, no JVD  ABDOMEN:  Soft, non-tender, non-distended, surgical scars noted, normoactive bowel sounds  RECTAL: Maroon/dark stool  EXTREMITIES:  no cyanosis, clubbing or edema  SKIN:  No rash/erythema/ecchymoses/petechiae/wounds/abscess/warm/dry  NEURO:  Alert, oriented, nonfocal      LABS:                        7.8    13.37 )-----------( 347      ( 2020 02:20 )             24.8     02-13    134<L>  |  101  |  14  ----------------------------<  114<H>  4.1   |  24  |  0.40<L>    Ca    7.8<L>      2020 02:20  Phos  3.5     02-13  Mg     1.8     02-13    TPro  5.9<L>  /  Alb  2.4<L>  /  TBili  0.4  /  DBili  0.2  /  AST  22  /  ALT  55<H>  /  AlkPhos  89      LIVER FUNCTIONS - ( 2020 02:20 )  Alb: 2.4 g/dL / Pro: 5.9 g/dL / ALK PHOS: 89 U/L / ALT: 55 U/L / AST: 22 U/L / GGT: x           PT/INR - ( 2020 02:20 )   PT: 15.0 sec;   INR: 1.29 ratio         PTT - ( 2020 02:20 )  PTT:36.5 sec        Imaging:

## 2020-02-13 NOTE — PROGRESS NOTE ADULT - SUBJECTIVE AND OBJECTIVE BOX
SUBJECTIVE: Pt seen and examined at bedside. Patient doing well, denies fevers, chills, nausea, emesis, chest pain, SOB. Pt says he hasn't had a bowel movement overnight, but bowel movement yesterday had no blood in the stool.           Vital Signs Last 24 Hrs  T(C): 37.2 (13 Feb 2020 08:00), Max: 37.4 (13 Feb 2020 03:00)  T(F): 99 (13 Feb 2020 08:00), Max: 99.3 (13 Feb 2020 03:00)  HR: 91 (13 Feb 2020 08:00) (83 - 103)  BP: 137/67 (13 Feb 2020 08:00) (105/58 - 165/99)  BP(mean): 96 (13 Feb 2020 08:00) (78 - 126)  RR: 24 (13 Feb 2020 08:00) (18 - 33)  SpO2: 94% (13 Feb 2020 08:00) (92% - 99%)  I&O's Summary    12 Feb 2020 07:01  -  13 Feb 2020 07:00  --------------------------------------------------------  IN: 2512.4 mL / OUT: 1950 mL / NET: 562.4 mL    13 Feb 2020 07:01  -  13 Feb 2020 08:40  --------------------------------------------------------  IN: 83 mL / OUT: 200 mL / NET: -117 mL      I&O's Detail    12 Feb 2020 07:01  -  13 Feb 2020 07:00  --------------------------------------------------------  IN:    fat emulsion (Fish Oil and Plant Based) 20% Infusion: 270.4 mL    pantoprazole Infusion: 100 mL    Solution: 50 mL    Solution: 100 mL    TPN (Total Parenteral Nutrition): 1992 mL  Total IN: 2512.4 mL    OUT:    Voided: 1950 mL  Total OUT: 1950 mL    Total NET: 562.4 mL      13 Feb 2020 07:01  -  13 Feb 2020 08:40  --------------------------------------------------------  IN:    TPN (Total Parenteral Nutrition): 83 mL  Total IN: 83 mL    OUT:    Voided: 200 mL  Total OUT: 200 mL    Total NET: -117 mL          Labs:                         7.8    13.37 )-----------( 347      ( 13 Feb 2020 02:20 )             24.8     02-13    134<L>  |  101  |  14  ----------------------------<  114<H>  4.1   |  24  |  0.40<L>    Ca    7.8<L>      13 Feb 2020 02:20  Phos  3.5     02-13  Mg     1.8     02-13    TPro  5.9<L>  /  Alb  2.4<L>  /  TBili  0.4  /  DBili  0.2  /  AST  22  /  ALT  55<H>  /  AlkPhos  89  02-13    PT/INR - ( 13 Feb 2020 02:20 )   PT: 15.0 sec;   INR: 1.29 ratio         PTT - ( 13 Feb 2020 02:20 )  PTT:36.5 sec      Physical Exam:  General Appearance: NAD, A&O x3  Abdomen: soft, nontender, nondistended, incision intact w/SS drainage SUBJECTIVE: Pt seen and examined at bedside. Patient doing well, denies fevers, chills, nausea, emesis, chest pain, SOB. Pt says he hasn't had a bowel movement overnight, but bowel movement yesterday had no blood in the stool.     Upper Endoscopy yesterday: Nodular mucosa in the gastric fundus and body. Biopsied. With no evidence of GI bleeding seen in the upper GI tract.  Colonoscopy yesterday: Preparation of the colon was inadequate.  Patent end-to-side ileo-colonic anastomosis, characterized by friable mucosa and an intact appearance. No active bleeding or stigmata of bleeding was seen.      Vital Signs Last 24 Hrs  T(C): 37.2 (13 Feb 2020 08:00), Max: 37.4 (13 Feb 2020 03:00)  T(F): 99 (13 Feb 2020 08:00), Max: 99.3 (13 Feb 2020 03:00)  HR: 91 (13 Feb 2020 08:00) (83 - 103)  BP: 137/67 (13 Feb 2020 08:00) (105/58 - 165/99)  BP(mean): 96 (13 Feb 2020 08:00) (78 - 126)  RR: 24 (13 Feb 2020 08:00) (18 - 33)  SpO2: 94% (13 Feb 2020 08:00) (92% - 99%)  I&O's Summary    12 Feb 2020 07:01  -  13 Feb 2020 07:00  --------------------------------------------------------  IN: 2512.4 mL / OUT: 1950 mL / NET: 562.4 mL    13 Feb 2020 07:01  -  13 Feb 2020 08:40  --------------------------------------------------------  IN: 83 mL / OUT: 200 mL / NET: -117 mL      I&O's Detail    12 Feb 2020 07:01  -  13 Feb 2020 07:00  --------------------------------------------------------  IN:    fat emulsion (Fish Oil and Plant Based) 20% Infusion: 270.4 mL    pantoprazole Infusion: 100 mL    Solution: 50 mL    Solution: 100 mL    TPN (Total Parenteral Nutrition): 1992 mL  Total IN: 2512.4 mL    OUT:    Voided: 1950 mL  Total OUT: 1950 mL    Total NET: 562.4 mL      13 Feb 2020 07:01  -  13 Feb 2020 08:40  --------------------------------------------------------  IN:    TPN (Total Parenteral Nutrition): 83 mL  Total IN: 83 mL    OUT:    Voided: 200 mL  Total OUT: 200 mL    Total NET: -117 mL          Labs:                         7.8    13.37 )-----------( 347      ( 13 Feb 2020 02:20 )             24.8     02-13    134<L>  |  101  |  14  ----------------------------<  114<H>  4.1   |  24  |  0.40<L>    Ca    7.8<L>      13 Feb 2020 02:20  Phos  3.5     02-13  Mg     1.8     02-13    TPro  5.9<L>  /  Alb  2.4<L>  /  TBili  0.4  /  DBili  0.2  /  AST  22  /  ALT  55<H>  /  AlkPhos  89  02-13    PT/INR - ( 13 Feb 2020 02:20 )   PT: 15.0 sec;   INR: 1.29 ratio         PTT - ( 13 Feb 2020 02:20 )  PTT:36.5 sec      Physical Exam:  General Appearance: NAD, A&O x3  Abdomen: soft, nontender, nondistended, incision intact w/SS drainage

## 2020-02-13 NOTE — PROGRESS NOTE ADULT - SUBJECTIVE AND OBJECTIVE BOX
[facility] NUTRITION SUPPORT / TPN -- FOLLOW UP NOTE  --------------------------------------------------------------------------------    24 hour events/subjective:  H/H stable  no BM /flatus today       Pt on Protonix gtt  Pt for EGD/colonoscopy today  Tolerating was tolerating CLD  Pain appropriate - relief w/ pain Rx  No n/v  No cough/ cp/ palp/dyspnea/ sob  No f/c/s      Diet:  Diet, Low Fiber:   Supplement Feeding Modality:  Oral  Ensure Clear Cans or Servings Per Day:  2       Frequency:  Three Times a day (02-13-20 @ 11:52)      Appetite: [  ]Poor [  ]Adequate [ x ]Good  Caloric intake:  [  x ]  Adequate   [   ] Inadequate    ROS: General/ GI see HPI  all other systems negative      ALLERGIES & MEDICATIONS  --------------------------------------------------------------------------------  ALLERGIES  IV Contrast (Hives)    STANDING INPATIENT MEDICATIONS    albuterol/ipratropium for Nebulization. 3 milliLiter(s) Nebulizer every 6 hours  buDESOnide    Inhalation Suspension 0.5 milliGRAM(s) Inhalation every 12 hours  chlorhexidine 2% Cloths 1 Application(s) Topical <User Schedule>  enoxaparin Injectable 40 milliGRAM(s) SubCutaneous daily  ergocalciferol 24441 Unit(s) Oral every week  fat emulsion (Fish Oil and Plant Based) 20% Infusion 20.8 mL/Hr IV Continuous <Continuous>  guaiFENesin  milliGRAM(s) Oral every 12 hours  metoprolol tartrate 12.5 milliGRAM(s) Oral every 12 hours  nystatin/triamcinolone Cream 1 Application(s) Topical two times a day  pantoprazole   Suspension 40 milliGRAM(s) Oral daily  Parenteral Nutrition - Adult 1 Each TPN Continuous <Continuous>  Parenteral Nutrition - Adult 1 Each TPN Continuous <Continuous>  tamsulosin 0.4 milliGRAM(s) Oral at bedtime  triamcinolone 0.1% Ointment 1 Application(s) Topical two times a day      PRN INPATIENT MEDICATION  acetaminophen   Tablet .. 975 milliGRAM(s) Oral every 6 hours PRN  benzocaine 15 mG/menthol 3.6 mG (Sugar-Free) Lozenge 1 Lozenge Oral every 4 hours PRN  oxyCODONE    IR 5 milliGRAM(s) Oral every 4 hours PRN        VITALS/PHYSICAL EXAM  --------------------------------------------------------------------------------  T(C): 37.2 (02-13-20 @ 08:00), Max: 37.4 (02-13-20 @ 03:00)  HR: 88 (02-13-20 @ 12:10) (83 - 103)  BP: 144/72 (02-13-20 @ 11:00) (105/58 - 165/99)  RR: 32 (02-13-20 @ 11:00) (20 - 33)  SpO2: 95% (02-13-20 @ 12:10) (82% - 99%)  Wt(kg): --        02-12-20 @ 07:01  -  02-13-20 @ 07:00  --------------------------------------------------------  IN: 2512.4 mL / OUT: 1950 mL / NET: 562.4 mL    02-13-20 @ 07:01  -  02-13-20 @ 12:16  --------------------------------------------------------  IN: 682 mL / OUT: 750 mL / NET: -68 mL    PHYSICAL EXAM:  --------------------------------------------------------------------------------  	Gen: guarded but stable, A&Ox3  	HEENT: NC/AT, PERRL, supple neck, trachea midline, mucosa moist              GI: (+) BS, softly distended, nontender                    midline dressing c/d/i                   Rt sided ostomy site dressing c/d/i              MSK: FROM x4, no contractures  	Vascular: Equally Warm,  no edema, no clubbing, cyanosis,                      RUE PICC w/o sx infection   	Neuro: No focal deficits, intact sensation, weakened strength BLE>BUE  	Psych: Normal affect and mood              skin: moist, erythematous pinpoint rash chest/ back, groin        LABS/ CULTURES/ RADIOLOGY:              7.8    13.37 >-----------<  347      [02-13-20 @ 02:20]              24.8     134  |  101  |  14  ----------------------------<  114      [02-13-20 @ 02:20]  4.1   |  24  |  0.40        Ca     7.8     [02-13-20 @ 02:20]      iCa    1.11     [02-13 @ 02:11]      Mg     1.8     [02-13-20 @ 02:20]      Phos  3.5     [02-13-20 @ 02:20]    TPro  5.9  /  Alb  2.4  /  TBili  0.4  /  DBili  0.2  /  AST  22  /  ALT  55  /  AlkPhos  89  [02-13-20 @ 02:20]    PT/INR: PT 15.0 , INR 1.29       [02-13-20 @ 02:20]  PTT: 36.5       [02-13-20 @ 02:20]      Prealbumin, Serum: 17 mg/dL (02-10-20 @ 09:15)  Prealbumin, Serum: 21 mg/dL (02-03-20 @ 23:50)  Prealbumin, Serum: 19 mg/dL (02-03-20 @ 07:18)  Prealbumin, Serum: 18 mg/dL (02-02-20 @ 09:48)  Prealbumin, Serum: 18 mg/dL (01-31-20 @ 07:40)    Triglycerides, Serum: 59 mg/dL (02.10.20 @ 01:03)  Triglycerides, Serum: 53 mg/dL (02.03.20 @ 21:38)  Triglycerides, Serum: 61 mg/dL (02.02.20 @ 01:41) [facility] NUTRITION SUPPORT / TPN -- FOLLOW UP NOTE  --------------------------------------------------------------------------------    24 hour events/subjective:  H/H stable  no BM /flatus today       Pt on Protonix suspension  s/p EGD/colonoscopy- findings noted  Tolerating CLD plan to advance to LRD  Pain appropriate - relief w/ pain Rx  No n/v  No cough/ cp/ palp/dyspnea/ sob  No f/c/s      Diet:  Diet, Low Fiber:   Supplement Feeding Modality:  Oral  Ensure Clear Cans or Servings Per Day:  2       Frequency:  Three Times a day (02-13-20 @ 11:52)      Appetite: [  ]Poor [  ]Adequate [ x ]Good  Caloric intake:  [  x ]  Adequate   [   ] Inadequate    ROS: General/ GI see HPI  all other systems negative      ALLERGIES & MEDICATIONS  --------------------------------------------------------------------------------  ALLERGIES  IV Contrast (Hives)    STANDING INPATIENT MEDICATIONS    albuterol/ipratropium for Nebulization. 3 milliLiter(s) Nebulizer every 6 hours  buDESOnide    Inhalation Suspension 0.5 milliGRAM(s) Inhalation every 12 hours  chlorhexidine 2% Cloths 1 Application(s) Topical <User Schedule>  enoxaparin Injectable 40 milliGRAM(s) SubCutaneous daily  ergocalciferol 78604 Unit(s) Oral every week  fat emulsion (Fish Oil and Plant Based) 20% Infusion 20.8 mL/Hr IV Continuous <Continuous>  guaiFENesin  milliGRAM(s) Oral every 12 hours  metoprolol tartrate 12.5 milliGRAM(s) Oral every 12 hours  nystatin/triamcinolone Cream 1 Application(s) Topical two times a day  pantoprazole   Suspension 40 milliGRAM(s) Oral daily  Parenteral Nutrition - Adult 1 Each TPN Continuous <Continuous>  Parenteral Nutrition - Adult 1 Each TPN Continuous <Continuous>  tamsulosin 0.4 milliGRAM(s) Oral at bedtime  triamcinolone 0.1% Ointment 1 Application(s) Topical two times a day      PRN INPATIENT MEDICATION  acetaminophen   Tablet .. 975 milliGRAM(s) Oral every 6 hours PRN  benzocaine 15 mG/menthol 3.6 mG (Sugar-Free) Lozenge 1 Lozenge Oral every 4 hours PRN  oxyCODONE    IR 5 milliGRAM(s) Oral every 4 hours PRN        VITALS/PHYSICAL EXAM  --------------------------------------------------------------------------------  T(C): 37.2 (02-13-20 @ 08:00), Max: 37.4 (02-13-20 @ 03:00)  HR: 88 (02-13-20 @ 12:10) (83 - 103)  BP: 144/72 (02-13-20 @ 11:00) (105/58 - 165/99)  RR: 32 (02-13-20 @ 11:00) (20 - 33)  SpO2: 95% (02-13-20 @ 12:10) (82% - 99%)  Wt(kg): --        02-12-20 @ 07:01  -  02-13-20 @ 07:00  --------------------------------------------------------  IN: 2512.4 mL / OUT: 1950 mL / NET: 562.4 mL    02-13-20 @ 07:01  -  02-13-20 @ 12:16  --------------------------------------------------------  IN: 682 mL / OUT: 750 mL / NET: -68 mL    PHYSICAL EXAM:  --------------------------------------------------------------------------------  	Gen: guarded but stable, A&Ox3  	HEENT: NC/AT, PERRL, supple neck, trachea midline, mucosa moist              GI: (+) BS, softly distended, nontender                    midline dressing c/d/i                   Rt sided ostomy site dressing c/d/i              MSK: FROM x4, no contractures  	Vascular: Equally Warm,  no edema, no clubbing, cyanosis,                      RUE PICC w/o sx infection   	Neuro: No focal deficits, grossly intact sensation, & strength   	Psych: Normal affect and mood              skin: moist, erythematous pinpoint rash chest/ back, groin        LABS/ CULTURES/ RADIOLOGY:              7.8    13.37 >-----------<  347      [02-13-20 @ 02:20]              24.8     134  |  101  |  14  ----------------------------<  114      [02-13-20 @ 02:20]  4.1   |  24  |  0.40        Ca     7.8     [02-13-20 @ 02:20]      iCa    1.11     [02-13 @ 02:11]      Mg     1.8     [02-13-20 @ 02:20]      Phos  3.5     [02-13-20 @ 02:20]    TPro  5.9  /  Alb  2.4  /  TBili  0.4  /  DBili  0.2  /  AST  22  /  ALT  55  /  AlkPhos  89  [02-13-20 @ 02:20]    PT/INR: PT 15.0 , INR 1.29       [02-13-20 @ 02:20]  PTT: 36.5       [02-13-20 @ 02:20]      Prealbumin, Serum: 17 mg/dL (02-10-20 @ 09:15)  Prealbumin, Serum: 21 mg/dL (02-03-20 @ 23:50)  Prealbumin, Serum: 19 mg/dL (02-03-20 @ 07:18)  Prealbumin, Serum: 18 mg/dL (02-02-20 @ 09:48)  Prealbumin, Serum: 18 mg/dL (01-31-20 @ 07:40)    Triglycerides, Serum: 59 mg/dL (02.10.20 @ 01:03)  Triglycerides, Serum: 53 mg/dL (02.03.20 @ 21:38)  Triglycerides, Serum: 61 mg/dL (02.02.20 @ 01:41)    < from: Colonoscopy (02.12.20 @ 15:31) >  Findings:       The perianal and digital rectal examinations were normal.       A moderate amount of semi-solid stool was found in the entire colon, interfering with        visualization.       The examined terminal ileum appeared normal.       There was evidence of a prior end-to-side ileo-colonic anastomosis in the ascending colon.        This was patent and was visualized after lavage and suctioning of a large amount of        semi-solid stool. The anastomosis was characterized by friable mucosa, some pallor but was        otherwise intact appearance. The anastomosis was traversed. Careful evaluation of the        anastomosis revealed no bleeding or stigmata of bleeding.       Severe diverticular disease, characterized by many small and large-mouthed diverticula were        found in the sigmoid colon.  Impression:          - Preparation of the colon was inadequate.                       - Stool in the entire examined colon.                       - The examined portion of the ileum was normal.                       - Patent end-to-side ileo-colonic anastomosis, characterized by friable                        mucosa and an intact appearance. No active bleeding or stigmata of bleeding                        was seen.                       - Diverticulosis in the sigmoid colon.                       - No specimens collected.      < from: Upper Endoscopy (02.12.20 @ 15:34) >  Findings:       The upper third of the esophagus and middle third of the esophagus were normal.       A 4 cm hiatus hernia was present.       Localized mildly erythematous mucosa (two small red spots less than 5mm in size) without        bleeding was found in the gastric body.       Diffuse nodular mucosa was found in the gastric fundus and in the gastric body. Biopsies were        taken with a cold forceps for histology.       The examined duodenum was normal.                                                                                                        Impression:          - Normal upper third of esophagus and middle third of esophagus.          - 4 cm hiatus hernia.                       - Erythematous mucosa in the gastric body.                       - Nodular mucosa in the gastric fundus and in the gastric body. Biopsied.                       - Normal examined duodenum.              - No evidence of GI bleeding seen in the upper GI tract.  Recommendation:      - Await pathology results.        < end of copied text >

## 2020-02-13 NOTE — PROGRESS NOTE ADULT - ASSESSMENT
A/P: 75 year old male w/ PMH of COPD and EtOH dependence with PSH/o appy, prostate surgery, & spine surgery  septic shock and high grade SBO s/p exploratory laparotomy, lysis of adhesions, decompression of bowel via enterotomy w/ primary repair, and Abthera VAC placement on 12/6; s/p take down of Abthera, washout and re-application of the Abthera vac on 12/8. Now s/p SBR and end ileostomy on 12/10.   TPN initally consulted to assist w/ management of pt's nutrition in pt with SGS/malabsorption and had high Ileostomy output.  Pt s/p Lt Chest Pigtail for effusion in IR with persistent high output on 1/10/2020. Pt had VATs & placement of Left PleurX catheter. Post op pt developed hemothorax and Respiratory Distress.  Pt s/p Lt chest Evacuation of 2L blood w/ placement of CT x2 on 1/15/2020.  Lt Pleural Effusion resolved and pt doing well after CT removed.  Pt also noted with Rt PNA vs Pleural Effusion that has resolved.  Pt is s/p Ex Lap, MIRYAM, & Closure of End Ileostomy on 2/4/2020.      Pt w/ improving severe Protein-Calorie Malnutrition-         On going monitoring postop after Reversal of Ileostomy in pt w/ Short Gut Syndrome w/Malabsorption s/p SBR  TPN at GOAL:  Amino Acids 120g, Dextrose 210g, and Lipids 50g in 2000mL with 3mL MTE & 10mL MVI          -GIB -stable H&H  EGD & Colonoscopy noted- bx sent         Protonix suspension  -HypoCa- increased Ca to 16mEq in TPN to 14mEq- continue to monitor        Improved Ca levels helpful for BP & muscle contraction  -LowK - improving w/ 80mEq KCl in TPN, continue to monitor        maintaining K & Mg will help with GI motility   -LowMg- improving with increased Mg to 16mEq         continue to monitor as pt on Protonix   -HypoPhos- improving w/-increased NaPhos & will continue to monitor   -Fecal fat testing suggestive of decreased absorption of fat - while pt had ileostomy            will monitor for improved absorption after ileostomy reversal         TPN w/ MVI to compensate for low Vit E & A        Vit D levels low- being supplemented w/Ergocalciferol         Vit K INR/ PT near normal suggestive that it is being absorbed   -Strict Intake and Output - continue to closely monitor         continue TPN while diet advanced.  No BM, Urine output 1950mL  -Good glycemic control-  Fingersticks & ISS coverage - as per SICU  -Weights three times a week  -Daily CMP, Mg, Ionized Ca, Phosphorus,        and Weekly Triglycerides and Pre-albumin  Continue as per SICU/Surgery, will follow with you, D/w primary team    Andreina Hubbard PA-C  TPN team, pager [phone]  D/w Ana M Chacko & MU Siegel

## 2020-02-13 NOTE — PROGRESS NOTE ADULT - ASSESSMENT
Impression:  # GI Bleed (melena with maroon colored stool): Hgb slight downtrend and requiring transfusion. Vitals stable, patient somewhat hypertensive and BUN/Cr are stable which is reassuring for likely slow bleeding. Unclear if bleeding from upper GI source  s/p EGD and colonoscopy done on 2/12 showing no obvious source of active bleed  # SBO high grade s/p multiple surgeries  # Shock: resolved    Recommendation:  - trend CBC, CMP, INR  - continue with supportive care and transfusions  - advance diet as tolerated  - supportive care as per primary team Impression:  # GI Bleed (melena with maroon colored stool): Hgb slight downtrend and requiring transfusion. s/p EGD and colonoscopy done on 2/12 showing no obvious source of active bleed  # SBO high grade s/p multiple surgeries  # Shock: resolved    Recommendation:  - trend CBC, CMP, INR  - continue with supportive care and transfusions  - advance diet as tolerated  - supportive care as per primary team Impression:  # GI Bleed (melena with maroon colored stool): Hgb slight downtrend and requiring transfusion. s/p EGD and colonoscopy done on 2/12 showing no obvious source of active bleed  # SBO high grade s/p multiple surgeries  # Shock: resolved    Recommendation:  - trend CBC, CMP, INR  - Follow-up gastric biopsy results  - continue with supportive care and transfusions  - advance diet as tolerated  - supportive care as per primary team

## 2020-02-13 NOTE — PROGRESS NOTE ADULT - ATTENDING COMMENTS
Pt seen and examined. Chart reviewed. Resident note confirmed. Pt is a 75 year old male with a  medical history signficant for CAD/COPD/SIADH who presented to [facility] with abdominal pain. He underwent right colectomy for diverticulitis and second look laparotomy for small intestinal ischemia. Pt underwent subsequent small intestinal resection. He had prolonged resp failure and required vasopressor support. He developed a spont ptx/pleural effusion, requiring chest tube and subsequent VATS. He developed high ileostomy output and has been on maximal medical therapy. Pt underwent reversal of ileostomy. His post operative period was complicated by melena. EGD and colonoscopy were negative for bleeding. H/h remain stable. No  events overnight.      PMH/PSH/MEDS/ALL/SH/FH/ROS:  Unchanged from H&P  Vitals/PE/Labs/radiographs:  Reviewed      A/p    Neuro:	Post op pain  	Continue pain control    CVS:	CAD  	S/p hypovolemia/hypotension  	Continue cardiac monitoring    Pulm:	COPD  	Atelectasis  	s/p ptx/effusion/VATS  	Continue ISP    GI:	S/p GI bleeding.  	S/p reversal of ileostomy  	s/p right colon rsn and ileal rsn  	Mild elevation of TB  	Malnutrition  	Continue TPN      :	s/p CHI  	Monitor I's and O's  	  Heme:	Acute blood loss anema  	Monitor H/h    ID:	S/p diverticulitis  	S/p sepsis  	Cx for fever    Endo:	Continue glycemic control.

## 2020-02-13 NOTE — PROGRESS NOTE ADULT - SUBJECTIVE AND OBJECTIVE BOX
Subjective: Patient seen and examined. No new events except as noted.   remains in ICU   s/p EGD/Colon  feels ok   no cp or sob       REVIEW OF SYSTEMS:    CONSTITUTIONAL: + weakness, fevers or chills  EYES/ENT: No visual changes;  No vertigo or throat pain   NECK: No pain or stiffness  RESPIRATORY: No cough, wheezing, hemoptysis; No shortness of breath  CARDIOVASCULAR: No chest pain or palpitations  GASTROINTESTINAL: No abdominal or epigastric pain. No nausea, vomiting, or hematemesis; No diarrhea or constipation. No melena or hematochezia.  GENITOURINARY: No dysuria, frequency or hematuria  NEUROLOGICAL: No numbness or weakness  SKIN: No itching, burning, rashes, or lesions   All other review of systems is negative unless indicated above.    MEDICATIONS:  MEDICATIONS  (STANDING):  albuterol/ipratropium for Nebulization. 3 milliLiter(s) Nebulizer every 6 hours  buDESOnide    Inhalation Suspension 0.5 milliGRAM(s) Inhalation every 12 hours  chlorhexidine 2% Cloths 1 Application(s) Topical <User Schedule>  enoxaparin Injectable 40 milliGRAM(s) SubCutaneous daily  ergocalciferol 78694 Unit(s) Oral every week  fat emulsion (Fish Oil and Plant Based) 20% Infusion 20.8 mL/Hr (20.8 mL/Hr) IV Continuous <Continuous>  guaiFENesin  milliGRAM(s) Oral every 12 hours  metoprolol tartrate 12.5 milliGRAM(s) Oral every 12 hours  nystatin/triamcinolone Cream 1 Application(s) Topical two times a day  pantoprazole  Injectable 40 milliGRAM(s) IV Push every 12 hours  Parenteral Nutrition - Adult 1 Each (83 mL/Hr) TPN Continuous <Continuous>  tamsulosin 0.4 milliGRAM(s) Oral at bedtime  triamcinolone 0.1% Ointment 1 Application(s) Topical two times a day      PHYSICAL EXAM:  T(C): 37.2 (02-13-20 @ 08:00), Max: 37.4 (02-13-20 @ 03:00)  HR: 88 (02-13-20 @ 10:00) (83 - 103)  BP: 156/72 (02-13-20 @ 10:00) (105/58 - 165/99)  RR: 29 (02-13-20 @ 10:00) (20 - 33)  SpO2: 88% (02-13-20 @ 10:00) (82% - 99%)  Wt(kg): --  I&O's Summary    12 Feb 2020 07:01  -  13 Feb 2020 07:00  --------------------------------------------------------  IN: 2512.4 mL / OUT: 1950 mL / NET: 562.4 mL    13 Feb 2020 07:01  -  13 Feb 2020 11:16  --------------------------------------------------------  IN: 682 mL / OUT: 550 mL / NET: 132 mL          Appearance: NAD  HEENT:   Normal oral mucosa, PERRL, EOMI	  Lymphatic: No lymphadenopathy , no edema  Cardiovascular: Normal S1 S2, No JVD, No murmurs , Peripheral pulses palpable 2+ bilaterally  Respiratory: Lungs clear to auscultation, normal effort 	  Gastrointestinal:  Soft, Non-tender, + BS	  Skin: No rashes, No ecchymoses, No cyanosis, warm to touch  Musculoskeletal: Normal range of motion, normal strength  Psychiatry:  Mood & affect appropriate  Ext: No edema      LABS:    CARDIAC MARKERS:                                7.8    13.37 )-----------( 347      ( 13 Feb 2020 02:20 )             24.8     02-13    134<L>  |  101  |  14  ----------------------------<  114<H>  4.1   |  24  |  0.40<L>    Ca    7.8<L>      13 Feb 2020 02:20  Phos  3.5     02-13  Mg     1.8     02-13    TPro  5.9<L>  /  Alb  2.4<L>  /  TBili  0.4  /  DBili  0.2  /  AST  22  /  ALT  55<H>  /  AlkPhos  89  02-13    proBNP:   Lipid Profile:   HgA1c:   TSH:             TELEMETRY: 	SR    ECG:  	  RADIOLOGY:   DIAGNOSTIC TESTING:  [ ] Echocardiogram:  [ ]  Catheterization:  [ ] Stress Test:    OTHER: 	< from: Upper Endoscopy (02.12.20 @ 15:34) >    [facility]  ____________________________________________________________________________________________________  Patient Name: Wicho Hernandez                       MRN: [id number]  Account Number: [id number]                     YOB: 1945  Room: Endoscopy Room 4                           Gender: Male  Attending MD: David Marsh MD          Procedure Date No Time: 2/12/2020  ____________________________________________________________________________________________________     Procedure:           Upper GI endoscopy  Indications:         Gastrointestinal bleeding of unknown origin  Providers:           David Marsh MD, Heidi Chandra (Fellow)  Medicines:           See the Anesthesia note for documentation of the administered medications  Complications:       No immediate complications.  ____________________________________________________________________________________________________  Procedure:           Pre-Anesthesia Assessment:                       - Prior to the procedure, a History and Physical was performed, and patient                        medications and allergies were reviewed. The patient is competent. The risks                        and benefits of the procedure and the sedation options and risks were                        discussed with the patient. All questions were answered and informed consent                        was obtained. Patient identification and proposed procedure were verified by                 the physician in the pre-procedure area. Mental Status Examination: alert and                        oriented. Airway Examination: normal oropharyngeal airway and neck mobility.                        Prophylactic Antibiotics: The patient does not require prophylactic                        antibiotics. Prior Anticoagulants: The patient has taken no previous                        anticoagulant or antiplatelet agents. ASA Grade Assessment: III - A patient                        withsevere systemic disease. After reviewing the risks and benefits, the                        patient was deemed in satisfactory condition to undergo the procedure. The                        anesthesia plan was to use monitored anesthesia care (MAC). Immediately prior                        to administration of medications, the patient was re-assessed for adequacy to                        receive sedatives. The heart rate, respiratory rate, oxygen saturations,                        blood pressure, adequacy of pulmonary ventilation, and response to care were                        monitored throughout the procedure. The physical status of the patient was                        re-assessed after the procedure.                       After obtaining informed consent, the endoscope was passed under direct                        vision. Throughout the procedure, the patient's blood pressure, pulse, and                        oxygen saturations were monitored continuously. The was introduced through                        the mouth, and advanced to the second part of duodenum. The upper GI                        endoscopy was accomplished without difficulty. The patient tolerated the                        procedure well.                                                                                      Findings:       The upper third of the esophagus and middle third of the esophagus were normal.       A 4 cm hiatus hernia was present.       Localized mildly erythematous mucosa (two small red spots less than 5mm in size) without        bleeding was found in the gastric body.       Diffuse nodular mucosa was found in the gastric fundus and in the gastric body. Biopsies were        taken with a cold forceps for histology.       The examined duodenum was normal.                                                                                                        Impression:          - Normal upper third of esophagus and middle third of esophagus.          - 4 cm hiatus hernia.                       - Erythematous mucosa in the gastric body.                       - Nodular mucosa in the gastric fundus and in the gastric body. Biopsied.                       - Normal examined duodenum.              - No evidence of GI bleeding seen in the upper GI tract.  Recommendation:      - Await pathology results.                       - Perform a colonoscopy today.                         Attending Participation:       I was present and participated during the entire procedure, including non-key portions.                                                                                                          ________________________  David Marsh MD  2/12/2020 5:11:56 PM  Number of Addenda: 0    Note Initiated On: 2/12/2020 3:34 PM    < from: Colonoscopy (02.12.20 @ 15:31) >    [facility]  ____________________________________________________________________________________________________  Patient Name: Wicho Hernandez                       MRN: [id number]  Account Number: [id number]                     YOB: 1945  Room: Endoscopy Room 4                           Gender: Male  Attending MD: David Marsh MD          Procedure Date No Time: 2/12/2020  ____________________________________________________________________________________________________     Procedure:           Colonoscopy  Indications:         Melena  Providers:           David Marsh MD  Medicines:           Monitored Anesthesia Care  Complications:       No immediate complications.  ____________________________________________________________________________________________________  Procedure:           Pre-Anesthesia Assessment:                       - Prior to the procedure, a History and Physical was performed, and patient     medications, allergies and sensitivities were reviewed. The patient's                        tolerance of previous anesthesia was reviewed.                       - The risks and benefits of the procedure and the sedation options and risks                were discussed with the patient. All questions were answered and informed                        consent was obtained.                       - Pre-procedure physical examination revealed no contraindications to                        sedation.                       - ASA Grade Assessment: III - A patient with severe systemic disease.                       - Sedation was administered by an anesthesia professional. Deep sedation was                        attained.   - The heart rate, respiratory rate, oxygen saturations, blood pressure,                        adequacy of pulmonary ventilation, and response to care were monitored                        throughout the procedure.                       - The physical status of the patient was re-assessed after the procedure.                       After I obtained informed consent, the scope was passed under direct vision.                        Throughout the procedure, the patient's blood pressure, pulse, and oxygen                        saturations were monitored continuously. The Colonoscope was introduced                        through the anus and advanced to the ileocolonic anastomosis. The colonoscopy                        was performed without difficulty. The patient tolerated the procedure well.                        The quality of the bowel preparation was inadequate.                                                                                                        Findings:       The perianal and digital rectal examinations were normal.       A moderate amount of semi-solid stool was found in the entire colon, interfering with        visualization.       The examined terminal ileum appeared normal.       There was evidence of a prior end-to-side ileo-colonic anastomosis in the ascending colon.        This was patent and was visualized after lavage and suctioning of a large amount of        semi-solid stool. The anastomosis was characterized by friable mucosa, some pallor but was        otherwise intact appearance. The anastomosis was traversed. Careful evaluation of the        anastomosis revealed no bleeding or stigmata of bleeding.       Severe diverticular disease, characterized by many small and large-mouthed diverticula were        found in the sigmoid colon.                                                                                                        Impression:          - Preparation of the colon was inadequate.                       - Stool in the entire examined colon.                       - The examined portion of the ileum was normal.                       - Patent end-to-side ileo-colonic anastomosis, characterized by friable                        mucosa and an intact appearance. No active bleeding or stigmata of bleeding                        was seen.                       - Diverticulosis in the sigmoid colon.                       - No specimens collected.  Recommendation:      - Return patient to ICU for ongoing care.        - Advance diet as tolerated.                       - Monitor blood counts.                       - Rest of care as per ICU team.                                                                                                        Attending Participation:       I personally performed the entire procedure.                                                                                                          ________________________  David Marsh MD  2/12/2020 5:19:18 PM  Number of Addenda: 0    Note Initiated On: 2/12/2020 3:31 PM    < end of copied text >

## 2020-02-13 NOTE — PROGRESS NOTE ADULT - ASSESSMENT
76 y/o M presenting with septic shock and high grade SBO s/p exploratory laparotomy, lysis of adhesions, decompression of bowel via enterotomy w/ primary repair, and Abthera VAC placement on 12/6; s/p take down of Abthera, washout and re-application of the Abthera vac on 12/8. 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 s/p L chest tube placement by IR 12/30 for pleural effusion now s/p VATS, with chest tube insertion for drainage of pleural effusion. RRT called 1/13 AM for hypoxia, patient transferred back to SICU.  Patient continued SOB, chest drain possible obstruction expanding. Patent taken back to OR emergently 1/15 for clot evac and VATS. Patient is now s/p Ileostomy reversal 2/4/20. Post-op c/b bloody BM and anemia requiring blood transfusion, now improved.    Upper Endoscopy 2/12/2020:  Erythematous mucosa in the gastric body.                        Nodular mucosa in the gastric fundus and in the gastric body. Biopsied.                      No evidence of GI bleeding seen in the upper GI tract.    Colonoscopy 2/12/2020:  Preparation of the colon was inadequate. Stool in the entire examined colon. The examined portion of the ileum was normal.                       Patent end-to-side ileo-colonic anastomosis, characterized by friable mucosa and an intact appearance. No active bleeding or stigmata of bleeding                        was seen.    PLAN:  - F/U H/H  - Recommend advancing diet  - Monitor BM frequency and appearance  - continue TPN, will calorie count once on regular diet  - OOB    [facility]  [id number] 76 y/o M presenting with septic shock and high grade SBO s/p exploratory laparotomy, lysis of adhesions, decompression of bowel via enterotomy w/ primary repair, and Abthera VAC placement on 12/6; s/p take down of Abthera, washout and re-application of the Abthera vac on 12/8. 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 s/p L chest tube placement by IR 12/30 for pleural effusion now s/p VATS, with chest tube insertion for drainage of pleural effusion. RRT called 1/13 AM for hypoxia, patient transferred back to SICU.  Patient continued SOB, chest drain possible obstruction expanding. Patent taken back to OR emergently 1/15 for clot evac and VATS. Patient is now s/p Ileostomy reversal 2/4/20. Post-op c/b bloody BM and anemia requiring blood transfusion, now improved.      PLAN:  - F/U H/H  - Recommend advancing diet  - Monitor BM frequency and appearance  - continue TPN, will calorie count once on regular diet  - OOB    [facility]  [id number] 74 y/o M presenting with septic shock and high grade SBO s/p exploratory laparotomy, lysis of adhesions, decompression of bowel via enterotomy w/ primary repair, and Abthera VAC placement on 12/6; s/p take down of Abthera, washout and re-application of the Abthera vac on 12/8. 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 s/p L chest tube placement by IR 12/30 for pleural effusion now s/p VATS, with chest tube insertion for drainage of pleural effusion. RRT called 1/13 AM for hypoxia, patient transferred back to SICU.  Patient continued SOB, chest drain possible obstruction expanding. Patent taken back to OR emergently 1/15 for clot evac and VATS. Patient is now s/p Ileostomy reversal 2/4/20. Post-op c/b bloody BM and anemia requiring blood transfusion, now improved.      PLAN:  - F/U H/H  - Recommend advancing diet  - Monitor BM frequency and appearance  - continue TPN, will calorie count once on regular diet  - OOB with PT    [facility]  [id number]

## 2020-02-14 LAB
ALBUMIN SERPL ELPH-MCNC: 2.5 G/DL — LOW (ref 3.3–5)
ALP SERPL-CCNC: 90 U/L — SIGNIFICANT CHANGE UP (ref 40–120)
ALT FLD-CCNC: 50 U/L — HIGH (ref 10–45)
ANION GAP SERPL CALC-SCNC: 10 MMOL/L — SIGNIFICANT CHANGE UP (ref 5–17)
APTT BLD: 36.2 SEC — SIGNIFICANT CHANGE UP (ref 27.5–36.3)
AST SERPL-CCNC: 22 U/L — SIGNIFICANT CHANGE UP (ref 10–40)
BILIRUB DIRECT SERPL-MCNC: 0.2 MG/DL — SIGNIFICANT CHANGE UP (ref 0–0.2)
BILIRUB INDIRECT FLD-MCNC: 0.3 MG/DL — SIGNIFICANT CHANGE UP (ref 0.2–1)
BILIRUB SERPL-MCNC: 0.5 MG/DL — SIGNIFICANT CHANGE UP (ref 0.2–1.2)
BUN SERPL-MCNC: 15 MG/DL — SIGNIFICANT CHANGE UP (ref 7–23)
CA-I BLD-SCNC: 1.12 MMOL/L — SIGNIFICANT CHANGE UP (ref 1.12–1.3)
CALCIUM SERPL-MCNC: 7.9 MG/DL — LOW (ref 8.4–10.5)
CHLORIDE SERPL-SCNC: 100 MMOL/L — SIGNIFICANT CHANGE UP (ref 96–108)
CO2 SERPL-SCNC: 23 MMOL/L — SIGNIFICANT CHANGE UP (ref 22–31)
CREAT SERPL-MCNC: 0.37 MG/DL — LOW (ref 0.5–1.3)
GLUCOSE SERPL-MCNC: 129 MG/DL — HIGH (ref 70–99)
HCT VFR BLD CALC: 26.4 % — LOW (ref 39–50)
HGB BLD-MCNC: 8.4 G/DL — LOW (ref 13–17)
INR BLD: 1.26 RATIO — HIGH (ref 0.88–1.16)
MAGNESIUM SERPL-MCNC: 1.7 MG/DL — SIGNIFICANT CHANGE UP (ref 1.6–2.6)
MCHC RBC-ENTMCNC: 30 PG — SIGNIFICANT CHANGE UP (ref 27–34)
MCHC RBC-ENTMCNC: 31.8 GM/DL — LOW (ref 32–36)
MCV RBC AUTO: 94.3 FL — SIGNIFICANT CHANGE UP (ref 80–100)
NRBC # BLD: 0 /100 WBCS — SIGNIFICANT CHANGE UP (ref 0–0)
PHOSPHATE SERPL-MCNC: 3.5 MG/DL — SIGNIFICANT CHANGE UP (ref 2.5–4.5)
PLATELET # BLD AUTO: 400 K/UL — SIGNIFICANT CHANGE UP (ref 150–400)
POTASSIUM SERPL-MCNC: 4.1 MMOL/L — SIGNIFICANT CHANGE UP (ref 3.5–5.3)
POTASSIUM SERPL-SCNC: 4.1 MMOL/L — SIGNIFICANT CHANGE UP (ref 3.5–5.3)
PROT SERPL-MCNC: 6.1 G/DL — SIGNIFICANT CHANGE UP (ref 6–8.3)
PROTHROM AB SERPL-ACNC: 14.5 SEC — HIGH (ref 10–12.9)
RBC # BLD: 2.8 M/UL — LOW (ref 4.2–5.8)
RBC # FLD: 14.4 % — SIGNIFICANT CHANGE UP (ref 10.3–14.5)
SODIUM SERPL-SCNC: 133 MMOL/L — LOW (ref 135–145)
WBC # BLD: 14.26 K/UL — HIGH (ref 3.8–10.5)
WBC # FLD AUTO: 14.26 K/UL — HIGH (ref 3.8–10.5)

## 2020-02-14 PROCEDURE — 99233 SBSQ HOSP IP/OBS HIGH 50: CPT

## 2020-02-14 PROCEDURE — 93970 EXTREMITY STUDY: CPT | Mod: 26

## 2020-02-14 RX ORDER — ELECTROLYTE SOLUTION,INJ
1 VIAL (ML) INTRAVENOUS
Refills: 0 | Status: DISCONTINUED | OUTPATIENT
Start: 2020-02-14 | End: 2020-02-14

## 2020-02-14 RX ORDER — I.V. FAT EMULSION 20 G/100ML
20.8 EMULSION INTRAVENOUS
Qty: 50 | Refills: 0 | Status: DISCONTINUED | OUTPATIENT
Start: 2020-02-14 | End: 2020-02-15

## 2020-02-14 RX ORDER — BUDESONIDE AND FORMOTEROL FUMARATE DIHYDRATE 160; 4.5 UG/1; UG/1
2 AEROSOL RESPIRATORY (INHALATION)
Refills: 0 | Status: DISCONTINUED | OUTPATIENT
Start: 2020-02-14 | End: 2020-02-27

## 2020-02-14 RX ORDER — MAGNESIUM SULFATE 500 MG/ML
2 VIAL (ML) INJECTION ONCE
Refills: 0 | Status: COMPLETED | OUTPATIENT
Start: 2020-02-14 | End: 2020-02-14

## 2020-02-14 RX ADMIN — Medication 3 MILLILITER(S): at 18:33

## 2020-02-14 RX ADMIN — I.V. FAT EMULSION 20.8 ML/HR: 20 EMULSION INTRAVENOUS at 18:49

## 2020-02-14 RX ADMIN — OXYCODONE HYDROCHLORIDE 5 MILLIGRAM(S): 5 TABLET ORAL at 23:50

## 2020-02-14 RX ADMIN — TAMSULOSIN HYDROCHLORIDE 0.4 MILLIGRAM(S): 0.4 CAPSULE ORAL at 23:52

## 2020-02-14 RX ADMIN — Medication 3 MILLILITER(S): at 23:52

## 2020-02-14 RX ADMIN — Medication 1 APPLICATION(S): at 06:37

## 2020-02-14 RX ADMIN — ENOXAPARIN SODIUM 40 MILLIGRAM(S): 100 INJECTION SUBCUTANEOUS at 12:33

## 2020-02-14 RX ADMIN — Medication 1 EACH: at 18:45

## 2020-02-14 RX ADMIN — Medication 1 APPLICATION(S): at 18:37

## 2020-02-14 RX ADMIN — Medication 600 MILLIGRAM(S): at 06:37

## 2020-02-14 RX ADMIN — CHLORHEXIDINE GLUCONATE 1 APPLICATION(S): 213 SOLUTION TOPICAL at 06:39

## 2020-02-14 RX ADMIN — Medication 3 MILLILITER(S): at 00:02

## 2020-02-14 RX ADMIN — Medication 975 MILLIGRAM(S): at 23:52

## 2020-02-14 RX ADMIN — Medication 1 APPLICATION(S): at 06:38

## 2020-02-14 RX ADMIN — BUDESONIDE AND FORMOTEROL FUMARATE DIHYDRATE 2 PUFF(S): 160; 4.5 AEROSOL RESPIRATORY (INHALATION) at 18:36

## 2020-02-14 RX ADMIN — Medication 0.5 MILLIGRAM(S): at 05:39

## 2020-02-14 RX ADMIN — Medication 12.5 MILLIGRAM(S): at 19:24

## 2020-02-14 RX ADMIN — Medication 50 GRAM(S): at 08:25

## 2020-02-14 RX ADMIN — Medication 12.5 MILLIGRAM(S): at 06:37

## 2020-02-14 RX ADMIN — PANTOPRAZOLE SODIUM 40 MILLIGRAM(S): 20 TABLET, DELAYED RELEASE ORAL at 12:34

## 2020-02-14 RX ADMIN — Medication 3 MILLILITER(S): at 12:33

## 2020-02-14 RX ADMIN — Medication 600 MILLIGRAM(S): at 19:25

## 2020-02-14 RX ADMIN — Medication 3 MILLILITER(S): at 05:39

## 2020-02-14 NOTE — CHART NOTE - NSCHARTNOTEFT_GEN_A_CORE
Nutrition Follow Up Note.    Patient seen for: malnutrition/TPN Team follow up    Source: patient, medical record, TPN Team rounds     Chart reviewed, events noted. Pt now S/P ileostomy reversal (2020). Pt S/P EGD/colonoscopy for drop in Hct and ongoing melena, now improved and diet resumed.    Nutrition Status: Severe Malnutrition. Pt with 150 cm small bowel remaining after GI surgery x 3. TPN initiated . Pt has been tolerating a po diet since . TPN remains at full goal post-op. Diet resumed after melena improved. Monitor ability to reduce/discontinue TPN as po intake meets needs.     Adjustments to TPN: no recent adjustments  Total volume: 2L  SMOF lipid (grams): 50  Amino Acids (grams): 120  Dextrose (grams): 210  Insulin (units): none; BG well managed  NaCl (mEq): increased to 80  Na acetate (mEq): reduced to zero  Na Phos (mmol): increased to 60  KCL (mEq): increased to 80; potassium WNL  Calcium gluconate (mEq): increased to 16; ionized calcium WNL  Mg sulfate (mEq): increased to 16; magnesium WNL  MTE-5 (ml): 3  MVI (ml): 10     Diet () Low Fiber; 3 servings Ensure Clear    Parenteral Nutrition: (): 2L infusing at 83ml/hr (120Gm amino acids, 210Gm dextrose, 50Gm SMOF lipids); to provide: 1694cal (31Kcal/Kg and 2.2Gm protein/Kg dosing wt 54.2Kg).    Non-Protein Calories: 1214 gregorio/day (22 gregorio/Kg)  Dextrose Infusion Rate: 2.7 mg/Kg/min  Lipid Infusion Rate: 0.92 Gm/Kg/day; 0.08 Gm/Kg/hr    Last BM:  (x5),  (x1)    Urine output x 24-hours: 2075ml    Daily Weight in k.8 (), Weight in k.3 (), Weight in k.2 (), Weight in k.1 (), Weight in k.5 (02-10), Weight in k.8 (), Weight in k.8 (-08)    Drug Dosing Weight  Weight (kg): 54 (2020 07:14)  BMI (kg/m2): 17.6 (2020 07:14)    Pertinent Medications: MEDICATIONS  (STANDING):  albuterol/ipratropium for Nebulization. 3 milliLiter(s) Nebulizer every 6 hours  buDESOnide    Inhalation Suspension 0.5 milliGRAM(s) Inhalation every 12 hours  chlorhexidine 2% Cloths 1 Application(s) Topical <User Schedule>  enoxaparin Injectable 40 milliGRAM(s) SubCutaneous daily  ergocalciferol 08178 Unit(s) Oral every week  fat emulsion (Fish Oil and Plant Based) 20% Infusion 20.8 mL/Hr (20.8 mL/Hr) IV Continuous <Continuous>  guaiFENesin  milliGRAM(s) Oral every 12 hours  metoprolol tartrate 12.5 milliGRAM(s) Oral every 12 hours  nystatin/triamcinolone Cream 1 Application(s) Topical two times a day  pantoprazole   Suspension 40 milliGRAM(s) Oral daily  Parenteral Nutrition - Adult 1 Each (83 mL/Hr) TPN Continuous <Continuous>  tamsulosin 0.4 milliGRAM(s) Oral at bedtime  triamcinolone 0.1% Ointment 1 Application(s) Topical two times a day    MEDICATIONS  (PRN):  acetaminophen   Tablet .. 975 milliGRAM(s) Oral every 6 hours PRN Mild Pain (1 - 3)  benzocaine 15 mG/menthol 3.6 mG (Sugar-Free) Lozenge 1 Lozenge Oral every 4 hours PRN Sore Throat  oxyCODONE    IR 5 milliGRAM(s) Oral every 4 hours PRN Moderate Pain (4 - 6)    LABS:    @ 00:32: Sodium 133<L>, Potassium 4.1, Chloride 100, Calcium 7.9<L>, Magnesium 1.7, Phosphorus 3.5, BUN 15, Creatinine 0.37<L>, <H>, Alk Phos 90, ALT/SGPT 50<H>, AST/SGOT 22, HbA1c --, Total Protein 6.1, Albumin 2.5<L>, Prealbumin --, Total Bilirubin 0.5, Direct Bilirubin 0.2, Hemoglobin 8.4<L>, Hematocrit 26.4<L>    Triglycerides, Serum: 59 mg/dL (02-10-20 @ 01:03)  Triglycerides, Serum: 53 mg/dL (20 @ 21:38)  Triglycerides, Serum: 59 mg/dL (20 @ 00:24)  Triglycerides, Serum: 61 mg/dL (20 @ 01:41)  Triglycerides, Serum: 51 mg/dL (20 @ 04:10)  Triglycerides, Serum: 83 mg/dL (20 @ 00:14)  Triglycerides, Serum: 78 mg/dL (20 @ 00:18)    Skin per nursing documentation: no pressure injuries documented  Edema: none noted    Estimated Needs: based on dosing wt 54.2Kg, with consideration for TPN, malnutrition  3862-1905 gregorio/day (25-30cal/Kg)   Gm protein/day (1.8-2.2Gm/Kg)     Previous Nutrition Diagnosis: Severe malnutrition  Nutrition Diagnosis is: ongoing, being addressed with TPN, and diet advancement    New Nutrition Diagnosis: none     Interventions: Add oral supplements; monitor ability to reduce/discontinue TPN    Recommend  1) TPN per TPN Team/Nutrition assessment; consider reducing to half goal  2) Continue Low Fiber diet as tolerated  3) Continue 3 servings apple Ensure Clear (240cal, 8Gm protein per serving)  4) Add 3 servings chocolate Ensure Enlive (350cal, 20Gm protein per 8oz serving); communicated with PA  5) Chocolate Mighty Shake supplement (200 calories, 6 Gm protein) added tid    Monitoring and Evaluation:     Continue to monitor nutrition provision and tolerance, weights, labs, skin integrity.    RD remains available upon request and will follow up per protocol.    Sowmya Graham, MS SPARKS CDN [facility], Pager # [phone]. Nutrition Follow Up Note.    Patient seen for: malnutrition/TPN Team follow up    Source: patient, medical record, TPN Team rounds     Chart reviewed, events noted. Pt now S/P ileostomy reversal (2020). Pt S/P EGD/colonoscopy for drop in Hct and ongoing melena, now improved and diet resumed.    Nutrition Status: Severe Malnutrition. Pt with 150 cm small bowel remaining after GI surgery x 3. TPN initiated . Pt has been tolerating a po diet since . TPN remains at full goal post-op. Diet resumed after melena improved. Monitor ability to reduce/discontinue TPN as po intake meets needs.     Adjustments to TPN: no recent adjustments  Total volume: 2L  SMOF lipid (grams): 50  Amino Acids (grams): 120  Dextrose (grams): 210  Insulin (units): none; BG well managed  NaCl (mEq): increased to 80  Na acetate (mEq): reduced to zero  Na Phos (mmol): increased to 60  KCL (mEq): increased to 80; potassium WNL  Calcium gluconate (mEq): increased to 16; ionized calcium WNL  Mg sulfate (mEq): increased to 16; magnesium WNL  MTE-5 (ml): 3  MVI (ml): 10     Diet () Low Fiber; 3 servings Ensure Clear    PO Intake: Pt consumed some turkey meatloaf and mashed potatoes for lunch yesterday, reports lack of appetite last night, but is eager for breakfast today.    Parenteral Nutrition: (): 2L infusing at 83ml/hr (120Gm amino acids, 210Gm dextrose, 50Gm SMOF lipids); to provide: 1694cal (31Kcal/Kg and 2.2Gm protein/Kg dosing wt 54.2Kg).    Non-Protein Calories: 1214 gregoiro/day (22 gregorio/Kg)  Dextrose Infusion Rate: 2.7 mg/Kg/min  Lipid Infusion Rate: 0.92 Gm/Kg/day; 0.08 Gm/Kg/hr    Last BM:  (x5),  (x1)    Urine output x 24-hours: 2075ml    Daily Weight in k.8 (), Weight in k.3 (), Weight in k.2 (), Weight in k.1 (), Weight in k.5 (02-10), Weight in k.8 (), Weight in k.8 ()    Drug Dosing Weight  Weight (kg): 54 (2020 07:14)  BMI (kg/m2): 17.6 (2020 07:14)    Pertinent Medications: MEDICATIONS  (STANDING):  albuterol/ipratropium for Nebulization. 3 milliLiter(s) Nebulizer every 6 hours  buDESOnide    Inhalation Suspension 0.5 milliGRAM(s) Inhalation every 12 hours  chlorhexidine 2% Cloths 1 Application(s) Topical <User Schedule>  enoxaparin Injectable 40 milliGRAM(s) SubCutaneous daily  ergocalciferol 43139 Unit(s) Oral every week  fat emulsion (Fish Oil and Plant Based) 20% Infusion 20.8 mL/Hr (20.8 mL/Hr) IV Continuous <Continuous>  guaiFENesin  milliGRAM(s) Oral every 12 hours  metoprolol tartrate 12.5 milliGRAM(s) Oral every 12 hours  nystatin/triamcinolone Cream 1 Application(s) Topical two times a day  pantoprazole   Suspension 40 milliGRAM(s) Oral daily  Parenteral Nutrition - Adult 1 Each (83 mL/Hr) TPN Continuous <Continuous>  tamsulosin 0.4 milliGRAM(s) Oral at bedtime  triamcinolone 0.1% Ointment 1 Application(s) Topical two times a day    MEDICATIONS  (PRN):  acetaminophen   Tablet .. 975 milliGRAM(s) Oral every 6 hours PRN Mild Pain (1 - 3)  benzocaine 15 mG/menthol 3.6 mG (Sugar-Free) Lozenge 1 Lozenge Oral every 4 hours PRN Sore Throat  oxyCODONE    IR 5 milliGRAM(s) Oral every 4 hours PRN Moderate Pain (4 - 6)    LABS:    @ 00:32: Sodium 133<L>, Potassium 4.1, Chloride 100, Calcium 7.9<L>, Magnesium 1.7, Phosphorus 3.5, BUN 15, Creatinine 0.37<L>, <H>, Alk Phos 90, ALT/SGPT 50<H>, AST/SGOT 22, HbA1c --, Total Protein 6.1, Albumin 2.5<L>, Prealbumin --, Total Bilirubin 0.5, Direct Bilirubin 0.2, Hemoglobin 8.4<L>, Hematocrit 26.4<L>    Triglycerides, Serum: 59 mg/dL (02-10-20 @ 01:03)  Triglycerides, Serum: 53 mg/dL (20 @ 21:38)  Triglycerides, Serum: 59 mg/dL (20 @ 00:24)  Triglycerides, Serum: 61 mg/dL (20 @ 01:41)  Triglycerides, Serum: 51 mg/dL (20 @ 04:10)  Triglycerides, Serum: 83 mg/dL (20 @ 00:14)  Triglycerides, Serum: 78 mg/dL (20 @ 00:18)    Skin per nursing documentation: no pressure injuries documented  Edema: none noted    Estimated Needs: based on dosing wt 54.2Kg, with consideration for TPN, malnutrition  7753-0002 gregorio/day (25-30cal/Kg)   Gm protein/day (1.8-2.2Gm/Kg)     Previous Nutrition Diagnosis: Severe malnutrition  Nutrition Diagnosis is: ongoing, being addressed with TPN, and diet advancement    New Nutrition Diagnosis: none     Interventions: Add oral supplements; monitor ability to reduce/discontinue TPN    Recommend  1) TPN per TPN Team/Nutrition assessment; consider reducing to half goal  2) Continue Low Fiber diet as tolerated  3) Continue 3 servings apple Ensure Clear (240cal, 8Gm protein per serving)  4) Add 3 servings chocolate Ensure Enlive (350cal, 20Gm protein per 8oz serving); communicated with PA  5) Chocolate Mighty Shake supplement (200 calories, 6 Gm protein) and Mighty Shake supplement (200 calories, 6 Gm protein) added tid per patient request    Monitoring and Evaluation:     Continue to monitor nutrition provision and tolerance, weights, labs, skin integrity.    RD remains available upon request and will follow up per protocol.    Sowmya Graham MS RD CDN [facility], Pager # [phone]. Nutrition Follow Up Note.    Patient seen for: malnutrition/TPN Team follow up    Source: patient, medical record, TPN Team rounds     Chart reviewed, events noted. Pt now S/P ileostomy reversal (2020). Pt S/P EGD/colonoscopy for drop in Hct and ongoing melena, now improved and diet resumed.    Nutrition Status: Severe Malnutrition. Pt with 150 cm small bowel remaining after GI surgery x 3. TPN initiated . Pt has been tolerating a po diet since . TPN remains at full goal post-op. Diet resumed after melena improved. Monitor ability to reduce TPN by half when po intake meets >50% of nutrition needs.     Adjustments to TPN: (no recent adjustments)  Total volume: 2L  SMOF lipid (grams): 50  Amino Acids (grams): 120  Dextrose (grams): 210  Insulin (units): none; BG well managed  NaCl (mEq): increased to 80  Na acetate (mEq): reduced to zero  Na Phos (mmol): increased to 60  KCL (mEq): increased to 80; potassium WNL  Calcium gluconate (mEq): increased to 16; ionized calcium WNL  Mg sulfate (mEq): increased to 16; magnesium WNL  MTE-5 (ml): 3  MVI (ml): 10     Diet () Low Fiber; 3 servings Ensure Clear    PO Intake: Pt consumed some turkey meatloaf and mashed potatoes for lunch yesterday, reports lack of appetite last night, but is eager for breakfast today.    Parenteral Nutrition: (): 2L infusing at 83ml/hr (120Gm amino acids, 210Gm dextrose, 50Gm SMOF lipids); to provide: 1694cal (31Kcal/Kg and 2.2Gm protein/Kg dosing wt 54.2Kg).    Non-Protein Calories: 1214 gregorio/day (22 gregorio/Kg)  Dextrose Infusion Rate: 2.7 mg/Kg/min  Lipid Infusion Rate: 0.92 Gm/Kg/day; 0.08 Gm/Kg/hr    Last BM:  (x5),  (x1)    Urine output x 24-hours: 2075ml    Daily Weight in k.8 (), Weight in k.3 (), Weight in k.2 (), Weight in k.1 (), Weight in k.5 (02-10), Weight in k.8 (), Weight in k.8 ()    Drug Dosing Weight  Weight (kg): 54 (2020 07:14)  BMI (kg/m2): 17.6 (2020 07:14)    Pertinent Medications: MEDICATIONS  (STANDING):  albuterol/ipratropium for Nebulization. 3 milliLiter(s) Nebulizer every 6 hours  buDESOnide    Inhalation Suspension 0.5 milliGRAM(s) Inhalation every 12 hours  chlorhexidine 2% Cloths 1 Application(s) Topical <User Schedule>  enoxaparin Injectable 40 milliGRAM(s) SubCutaneous daily  ergocalciferol 34121 Unit(s) Oral every week  fat emulsion (Fish Oil and Plant Based) 20% Infusion 20.8 mL/Hr (20.8 mL/Hr) IV Continuous <Continuous>  guaiFENesin  milliGRAM(s) Oral every 12 hours  metoprolol tartrate 12.5 milliGRAM(s) Oral every 12 hours  nystatin/triamcinolone Cream 1 Application(s) Topical two times a day  pantoprazole   Suspension 40 milliGRAM(s) Oral daily  Parenteral Nutrition - Adult 1 Each (83 mL/Hr) TPN Continuous <Continuous>  tamsulosin 0.4 milliGRAM(s) Oral at bedtime  triamcinolone 0.1% Ointment 1 Application(s) Topical two times a day    MEDICATIONS  (PRN):  acetaminophen   Tablet .. 975 milliGRAM(s) Oral every 6 hours PRN Mild Pain (1 - 3)  benzocaine 15 mG/menthol 3.6 mG (Sugar-Free) Lozenge 1 Lozenge Oral every 4 hours PRN Sore Throat  oxyCODONE    IR 5 milliGRAM(s) Oral every 4 hours PRN Moderate Pain (4 - 6)    LABS:    @ 00:32: Sodium 133<L>, Potassium 4.1, Chloride 100, Calcium 7.9<L>, Magnesium 1.7, Phosphorus 3.5, BUN 15, Creatinine 0.37<L>, <H>, Alk Phos 90, ALT/SGPT 50<H>, AST/SGOT 22, HbA1c --, Total Protein 6.1, Albumin 2.5<L>, Prealbumin --, Total Bilirubin 0.5, Direct Bilirubin 0.2, Hemoglobin 8.4<L>, Hematocrit 26.4<L>    Triglycerides, Serum: 59 mg/dL (02-10-20 @ 01:03)  Triglycerides, Serum: 53 mg/dL (20 @ 21:38)  Triglycerides, Serum: 59 mg/dL (20 @ 00:24)  Triglycerides, Serum: 61 mg/dL (20 @ 01:41)  Triglycerides, Serum: 51 mg/dL (20 @ 04:10)  Triglycerides, Serum: 83 mg/dL (20 @ 00:14)  Triglycerides, Serum: 78 mg/dL (20 @ 00:18)    Skin per nursing documentation: no pressure injuries documented  Edema: none noted    Estimated Needs: based on dosing wt 54.2Kg, with consideration for TPN, malnutrition  8320-6115 gregorio/day (25-30cal/Kg)   Gm protein/day (1.8-2.2Gm/Kg)     Previous Nutrition Diagnosis: Severe malnutrition  Nutrition Diagnosis is: ongoing, being addressed with TPN, and diet advancement    New Nutrition Diagnosis: none     Interventions: Add oral supplements; continue full dose TPN until tolerance to po diet is established    Recommend  1) TPN per TPN Team/Nutrition assessment; consider reducing to half goal when po intake meets >50% of nutrition needs  2) Continue Low Fiber diet as tolerated  3) Continue 3 servings apple Ensure Clear (240cal, 8Gm protein per serving)  4) Add 3 servings chocolate Ensure Enlive (350cal, 20Gm protein per 8oz serving); communicated with PA  5) Chocolate Mighty Shake supplement (200 calories, 6 Gm protein) and Mighty Shake supplement (200 calories, 6 Gm protein) added tid per patient request    Monitoring and Evaluation:     Continue to monitor nutrition provision and tolerance, weights, labs, skin integrity.    RD remains available upon request and will follow up per protocol.    Sowmya Graham, MS RD CDN [facility], Pager # [phone].

## 2020-02-14 NOTE — PROGRESS NOTE ADULT - ASSESSMENT
A/P: 75 year old male w/ PMH of COPD and EtOH dependence with PSH/o appy, prostate surgery, & spine surgery  septic shock and high grade SBO s/p exploratory laparotomy, lysis of adhesions, decompression of bowel via enterotomy w/ primary repair, and Abthera VAC placement on 12/6; s/p take down of Abthera, washout and re-application of the Abthera vac on 12/8. Now s/p SBR and end ileostomy on 12/10.   TPN initally consulted to assist w/ management of pt's nutrition in pt with SGS/malabsorption and had high Ileostomy output.  Pt s/p Lt Chest Pigtail for effusion in IR with persistent high output on 1/10/2020. Pt had VATs & placement of Left PleurX catheter. Post op pt developed hemothorax and Respiratory Distress.  Pt s/p Lt chest Evacuation of 2L blood w/ placement of CT x2 on 1/15/2020.  Lt Pleural Effusion resolved and pt doing well after CT removed.  Pt also noted with Rt PNA vs Pleural Effusion that has resolved.  Pt is s/p Ex Lap, MIRYAM, & Closure of End Ileostomy on 2/4/2020.      Pt w/ improving severe Protein-Calorie Malnutrition-         On going monitoring postop after Reversal of Ileostomy in pt w/ Short Gut Syndrome                  w/Malabsorption s/p SBR  TPN at GOAL:  Amino Acids 120g, Dextrose 210g, and Lipids 50g in 2000mL with 3mL MTE & 10mL MVI         Pt on LRD- appetite improving         Plan to reduce to 1/2 strength TPN when tolerating LRD        Pt transferred this am to med/surg floor     -GIB -no bloody BM &  H&H remains stable         bx pending from EGD/ Colonoscopy         Protonix suspension  -HypoCa- improving w/increased Ca to 16mEq in TPN continue to monitor        Improved Ca levels helpful for BP & muscle contraction  -LowK - improving w/ 80mEq KCl in TPN, continue to monitor        maintaining K & Mg will help with GI motility   -LowMg- improving with increased Mg to 16mEq         continue to monitor as pt on Protonix   -HypoPhos- improving w/-increased NaPhos & will continue to monitor   -Fecal fat testing suggestive of decreased absorption of fat - while pt had ileostomy            will monitor for improved absorption after ileostomy reversal -             pt with watery/loose BM post op        TPN w/ MVI to compensate for low Vit E & A        Vit D levels low- being supplemented w/Ergocalciferol         Vit K INR/ PT near normal suggestive that it is being absorbed   -Strict Intake and Output - continue to closely monitor         continue TPN while diet advanced.  watery/loose BM, Urine output 2075mL  -Good glycemic control-  Fingersticks & ISS coverage - as per SICU  -Weights three times a week  -Daily CMP, Mg, Ionized Ca, Phosphorus,        and Weekly Triglycerides and Pre-albumin  Continue as per SICU/Surgery, will follow with you, D/w primary team    Andreina Hubbard PA-C  TPN team, pager [phone]  D/w Ana M Chacko & MU Siegel A/P: 75 year old male w/ PMH of COPD and EtOH dependence with PSH/o appy, prostate surgery, & spine surgery  septic shock and high grade SBO s/p exploratory laparotomy, lysis of adhesions, decompression of bowel via enterotomy w/ primary repair, and Abthera VAC placement on 12/6; s/p take down of Abthera, washout and re-application of the Abthera vac on 12/8. Now s/p SBR and end ileostomy on 12/10.   TPN initally consulted to assist w/ management of pt's nutrition in pt with SGS/malabsorption and had high Ileostomy output.  Pt s/p Lt Chest Pigtail for effusion in IR with persistent high output on 1/10/2020. Pt had VATs & placement of Left PleurX catheter. Post op pt developed hemothorax and Respiratory Distress.  Pt s/p Lt chest Evacuation of 2L blood w/ placement of CT x2 on 1/15/2020.  Lt Pleural Effusion resolved and pt doing well after CT removed.  Pt also noted with Rt PNA vs Pleural Effusion that has resolved.  Pt is s/p Ex Lap, MIRYAM, & Closure of End Ileostomy on 2/4/2020.      Pt w/ improving severe Protein-Calorie Malnutrition-         On going monitoring postop after Reversal of Ileostomy in pt w/ Short Gut Syndrome                  w/Malabsorption s/p SBR  TPN at GOAL:  Amino Acids 120g, Dextrose 210g, and Lipids 50g in 2000mL with 3mL MTE & 10mL MVI         Pt on LRD- appetite improving         Plan to reduce to 1/2 strength TPN when tolerating LRD        Pt transferred this am to med/surg floor     -GIB -no bloody BM &  H&H remains stable         bx pending from EGD/ Colonoscopy         Protonix suspension  -HypoCa- improving w/increased Ca to 16mEq in TPN continue to monitor        Improved Ca levels helpful for BP & muscle contraction  -LowK - improving w/ 80mEq KCl in TPN, continue to monitor        maintaining K & Mg will help with GI motility   -LowMg- improving with increased Mg to 16mEq         continue to monitor as pt on Protonix   -HypoPhos- improving w/-increased NaPhos & will continue to monitor   -Fecal fat testing suggestive of decreased absorption of fat - while pt had ileostomy            will monitor for improved absorption after ileostomy reversal -             pt with watery/loose BM post op        TPN w/ MVI to compensate for low Vit E & A        Vit D levels low- being supplemented w/Ergocalciferol         Vit K INR/ PT near normal suggestive that it is being absorbed   -Strict Intake and Output - continue to closely monitor         continue TPN while diet advanced.  watery/loose BM, Urine output 2075mL  -Good glycemic control-  Fingersticks & ISS coverage - as per SICU  -Weights three times a week  -Daily CMP, Mg, Ionized Ca, Phosphorus,        and Weekly Triglycerides and Pre-albumin  Continue as per Surgery, will follow with you, D/w primary team    Andreina Hubbard PA-C  TPN team, pager [phone]  D/w Ana M Chacko & MU Siegel

## 2020-02-14 NOTE — PROGRESS NOTE ADULT - SUBJECTIVE AND OBJECTIVE BOX
[facility] NUTRITION SUPPORT / TPN -- FOLLOW UP NOTE  --------------------------------------------------------------------------------    24 hour events/subjective:  H/H stable  (+)flatus  (+)liquid BM last pm- nothing since then         no gross blood noted  Pt eating breakfast- happy to be eating regular food       (+)appetite this am       didn't have appetite/ eat much yesterday when diet was advanced  Pt on Protonix suspension QD  improving abdominal Pain - relief w/ pain Rx  LLE lower leg pain, more behind knee- when attempted to ambulate yesterday pm        overall swelling/ pain had resolved w/ rest & brace use        Duplex BLE ordered  No n/v  No cough/ cp/ palp/dyspnea/ sob  No f/c/s      Diet:  Diet, Low Fiber:   Supplement Feeding Modality:  Oral  Ensure Clear Cans or Servings Per Day:  2       Frequency:  Three Times a day  Ensure Enlive Cans or Servings Per Day:  2       Frequency:  Three Times a day (02-14-20 @ 10:07)      Appetite: [  ]Poor [  ]Adequate [x  ]Good  Caloric intake:  [ x  ]  Adequate   [   ] Inadequate    ROS: General/ GI see HPI  all other systems negative      ALLERGIES & MEDICATIONS  --------------------------------------------------------------------------------  IV Contrast (Hives)    STANDING INPATIENT MEDICATIONS    albuterol/ipratropium for Nebulization. 3 milliLiter(s) Nebulizer every 6 hours  buDESOnide    Inhalation Suspension 0.5 milliGRAM(s) Inhalation every 12 hours  chlorhexidine 2% Cloths 1 Application(s) Topical <User Schedule>  enoxaparin Injectable 40 milliGRAM(s) SubCutaneous daily  ergocalciferol 61319 Unit(s) Oral every week  fat emulsion (Fish Oil and Plant Based) 20% Infusion 20.8 mL/Hr IV Continuous <Continuous>  guaiFENesin  milliGRAM(s) Oral every 12 hours  metoprolol tartrate 12.5 milliGRAM(s) Oral every 12 hours  nystatin/triamcinolone Cream 1 Application(s) Topical two times a day  pantoprazole   Suspension 40 milliGRAM(s) Oral daily  Parenteral Nutrition - Adult 1 Each TPN Continuous <Continuous>  tamsulosin 0.4 milliGRAM(s) Oral at bedtime  triamcinolone 0.1% Ointment 1 Application(s) Topical two times a day      PRN INPATIENT MEDICATION  acetaminophen   Tablet .. 975 milliGRAM(s) Oral every 6 hours PRN  benzocaine 15 mG/menthol 3.6 mG (Sugar-Free) Lozenge 1 Lozenge Oral every 4 hours PRN  oxyCODONE    IR 5 milliGRAM(s) Oral every 4 hours PRN        VITALS/PHYSICAL EXAM  --------------------------------------------------------------------------------  T(C): 37.3 (02-14-20 @ 08:00), Max: 37.3 (02-14-20 @ 08:00)  HR: 91 (02-14-20 @ 08:00) (80 - 132)  BP: 120/62 (02-14-20 @ 08:00) (95/57 - 178/81)  RR: 27 (02-14-20 @ 08:00) (16 - 33)  SpO2: 92% (02-14-20 @ 08:00) (66% - 100%)  Wt(kg): --        02-13-20 @ 07:01  -  02-14-20 @ 07:00  --------------------------------------------------------  IN: 2830.2 mL / OUT: 2075 mL / NET: 755.2 mL    02-14-20 @ 07:01  -  02-14-20 @ 10:09  --------------------------------------------------------  IN: 83 mL / OUT: 300 mL / NET: -217 mL    PHYSICAL EXAM:  --------------------------------------------------------------------------------  	Gen: guarded but stable, A&Ox3  	HEENT: NC/AT, PERRL, supple neck, trachea midline, mucosa moist              GI: (+) BS, softly distended, nontender                    midline & Rt sided ostomy dressing c/d/i              MSK: FROM x4, no contractures nor deformaties                   LLE w/ brace   	Vascular: Equally Warm,  no edema, no clubbing, cyanosis,                      RUE PICC w/o sx infection  	Neuro: No focal deficits, grossly intact sensation, & strength   	Psych: Normal affect and mood              skin: moist, erythematous pinpoint rash chest/ back, groin        LABS/ CULTURES/ RADIOLOGY:              8.4    14.26 >-----------<  400      [02-14-20 @ 00:32]              26.4     133  |  100  |  15  ----------------------------<  129      [02-14-20 @ 00:32]  4.1   |  23  |  0.37        Ca     7.9     [02-14-20 @ 00:32]      iCa    1.12     [02-14 @ 00:40]      Mg     1.7     [02-14-20 @ 00:32]      Phos  3.5     [02-14-20 @ 00:32]    TPro  6.1  /  Alb  2.5  /  TBili  0.5  /  DBili  0.2  /  AST  22  /  ALT  50  /  AlkPhos  90  [02-14-20 @ 00:32]    PT/INR: PT 14.5 , INR 1.26       [02-14-20 @ 00:32]  PTT: 36.2       [02-14-20 @ 00:32]      Prealbumin, Serum: 17 mg/dL (02-10-20 @ 09:15)  Prealbumin, Serum: 21 mg/dL (02-03-20 @ 23:50)  Prealbumin, Serum: 19 mg/dL (02-03-20 @ 07:18)  Prealbumin, Serum: 18 mg/dL (02-02-20 @ 09:48)  Prealbumin, Serum: 18 mg/dL (01-31-20 @ 07:40)      Triglycerides, Serum: 59 mg/dL (02.10.20 @ 01:03)  Triglycerides, Serum: 53 mg/dL (02.03.20 @ 21:38)  Triglycerides, Serum: 61 mg/dL (02.02.20 @ 01:41)

## 2020-02-14 NOTE — PROGRESS NOTE ADULT - ASSESSMENT
74 y/o M presenting with septic shock and high grade SBO s/p exploratory laparotomy, lysis of adhesions, decompression of bowel via enterotomy w/ primary repair, and Abthera VAC placement on 12/6; s/p take down of Abthera, washout and re-application of the Abthera vac on 12/8. 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 s/p L chest tube placement by IR 12/30 for pleural effusion now s/p VATS, with chest tube insertion for drainage of pleural effusion. RRT called 1/13 AM for hypoxia, patient transferred back to SICU.  Patient continued SOB, chest drain possible obstruction expanding. Patent taken back to OR emergently 1/15 for clot evac and VATS. Patient is now s/p Ileostomy reversal 2/4/20. Post-op c/b bloody BM and anemia requiring blood transfusion, now improved.      PLAN:  - H/H stable at 26.4  - Tolerating diet- once adequate po intake, recommend DC TPN and add nutritional supplements (ensure etc)  - Monitor BM frequency and appearance  - OOB with PT  Possible floor transfer, appreciate SICU care

## 2020-02-14 NOTE — PROGRESS NOTE ADULT - SUBJECTIVE AND OBJECTIVE BOX
HISTORY  75y Male    24 HOUR EVENTS:  - Hydromorphone D/Bowen. Transitioned to oral acetaminophen and oxycodone  - Advanced to CLD  - Pain behind knee. Venous duplex ordered (still pending) for rule-out DVT    SUBJECTIVE/ROS:  [X] A ten-point review of systems was otherwise negative except as noted.  [ ] Due to altered mental status/intubation, subjective information were not able to be obtained from the patient. History was obtained, to the extent possible, from review of the chart and collateral sources of information.      NEURO  Exam: Awake, Alert, and Oriented. Pain well controlled.  Meds: acetaminophen   Tablet .. 975 milliGRAM(s) Oral every 6 hours PRN Mild Pain (1 - 3)  oxyCODONE    IR 5 milliGRAM(s) Oral every 4 hours PRN Moderate Pain (4 - 6)    [x] Adequacy of sedation and pain control has been assessed and adjusted      RESPIRATORY  RR: 27 (02-13-20 @ 23:00) (19 - 33)  SpO2: 99% (02-14-20 @ 00:02) (66% - 99%)    Exam: unlabored, clear to auscultation bilaterally    Meds: albuterol/ipratropium for Nebulization. 3 milliLiter(s) Nebulizer every 6 hours  buDESOnide    Inhalation Suspension 0.5 milliGRAM(s) Inhalation every 12 hours  guaiFENesin  milliGRAM(s) Oral every 12 hours        CARDIOVASCULAR  HR: 88 (02-14-20 @ 00:02) (86 - 132)  BP: 168/79 (02-13-20 @ 23:00) (95/57 - 178/81)  BP(mean): 113 (02-13-20 @ 23:00) (72 - 116)    Exam: regular rate and rhythm. No murmurs, rubs, or gallops.   Cardiac Rhythm: Sinus  Perfusion     [X]Adequate   [ ]Inadequate  Mentation   [X]Normal       [ ]Reduced  Extremities  [X]Warm         [ ]Cool  Volume Status [ ]Hypervolemic [X]Euvolemic [ ]Hypovolemic    Meds: metoprolol tartrate 12.5 milliGRAM(s) Oral every 12 hours  tamsulosin 0.4 milliGRAM(s) Oral at bedtime        GI/NUTRITION  Exam: Soft, nontender, nondistended. Clean dressing overlying midline.   Diet: CLD    Meds: pantoprazole   Suspension 40 milliGRAM(s) Oral daily      GENITOURINARY  I&O's Detail    02-12 @ 07:01 - 02-13 @ 07:00  --------------------------------------------------------  IN:    fat emulsion (Fish Oil and Plant Based) 20% Infusion: 270.4 mL    pantoprazole Infusion: 100 mL    Solution: 50 mL    Solution: 100 mL    TPN (Total Parenteral Nutrition): 1992 mL  Total IN: 2512.4 mL    OUT:    Voided: 1950 mL  Total OUT: 1950 mL    Total NET: 562.4 mL      02-13 @ 07:01 - 02-14 @ 00:05  --------------------------------------------------------  IN:    fat emulsion (Fish Oil and Plant Based) 20% Infusion: 41.6 mL    Oral Fluid: 900 mL    TPN (Total Parenteral Nutrition): 1079 mL  Total IN: 2020.6 mL    OUT:    Voided: 1375 mL  Total OUT: 1375 mL    Total NET: 645.6 mL          02-13    134<L>  |  101  |  14  ----------------------------<  114<H>  4.1   |  24  |  0.40<L>    Ca    7.8<L>      13 Feb 2020 02:20  Phos  3.5     02-13  Mg     1.8     02-13    TPro  5.9<L>  /  Alb  2.4<L>  /  TBili  0.4  /  DBili  0.2  /  AST  22  /  ALT  55<H>  /  AlkPhos  89  02-13    [ ] Martinez catheter, indication: N/A    Meds: ergocalciferol 99907 Unit(s) Oral every week  fat emulsion (Fish Oil and Plant Based) 20% Infusion 20.8 mL/Hr IV Continuous <Continuous>  Parenteral Nutrition - Adult 1 Each TPN Continuous <Continuous>        HEMATOLOGIC  Meds: enoxaparin Injectable 40 milliGRAM(s) SubCutaneous daily    [x] VTE Prophylaxis                        7.8    13.37 )-----------( 347      ( 13 Feb 2020 02:20 )             24.8     PT/INR - ( 13 Feb 2020 02:20 )   PT: 15.0 sec;   INR: 1.29 ratio         PTT - ( 13 Feb 2020 02:20 )  PTT:36.5 sec  Transfusion     [ ] PRBC   [ ] Platelets   [ ] FFP   [ ] Cryoprecipitate      INFECTIOUS DISEASES  T(C): 36.9 (02-13-20 @ 23:00), Max: 37.4 (02-13-20 @ 03:00)  WBC Count: 13.37 K/uL (02-13 @ 02:20)    Recent Cultures:  Specimen Source: .Urine Clean Catch (Midstream), 02-07 @ 09:40; Results   No growth; Gram Stain: --; Organism: --    Meds:       ENDOCRINE  Capillary Blood Glucose    Meds:       ACCESS DEVICES:  [ ] Peripheral IV  [ ] Central Venous Line	[ ] R	[ ] L	[ ] IJ	[ ] Fem	[ ] SC	Placed:   [ ] Arterial Line		[ ] R	[ ] L	[ ] Fem	[ ] Rad	[ ] Ax	Placed:   [ ] PICC:					[ ] Mediport  [ ] Urinary Catheter, Date Placed:   [ ] Necessity of urinary, arterial, and venous catheters discussed    OTHER MEDICATIONS:  benzocaine 15 mG/menthol 3.6 mG (Sugar-Free) Lozenge 1 Lozenge Oral every 4 hours PRN  chlorhexidine 2% Cloths 1 Application(s) Topical <User Schedule>  nystatin/triamcinolone Cream 1 Application(s) Topical two times a day  triamcinolone 0.1% Ointment 1 Application(s) Topical two times a day      CODE STATUS: Full Code    IMAGING:

## 2020-02-14 NOTE — PROGRESS NOTE ADULT - SUBJECTIVE AND OBJECTIVE BOX
Progress Note- Surgery      POD 10 s/p Closure, end ileostomy    SUBJECTIVE: FL+ BM- resting comfortable     MEDICATIONS  (STANDING):  albuterol/ipratropium for Nebulization. 3 milliLiter(s) Nebulizer every 6 hours  buDESOnide    Inhalation Suspension 0.5 milliGRAM(s) Inhalation every 12 hours  chlorhexidine 2% Cloths 1 Application(s) Topical <User Schedule>  enoxaparin Injectable 40 milliGRAM(s) SubCutaneous daily  ergocalciferol 83314 Unit(s) Oral every week  fat emulsion (Fish Oil and Plant Based) 20% Infusion 20.8 mL/Hr (20.8 mL/Hr) IV Continuous <Continuous>  guaiFENesin  milliGRAM(s) Oral every 12 hours  magnesium sulfate  IVPB 2 Gram(s) IV Intermittent once  metoprolol tartrate 12.5 milliGRAM(s) Oral every 12 hours  nystatin/triamcinolone Cream 1 Application(s) Topical two times a day  pantoprazole   Suspension 40 milliGRAM(s) Oral daily  Parenteral Nutrition - Adult 1 Each (83 mL/Hr) TPN Continuous <Continuous>  tamsulosin 0.4 milliGRAM(s) Oral at bedtime  triamcinolone 0.1% Ointment 1 Application(s) Topical two times a day    MEDICATIONS  (PRN):  acetaminophen   Tablet .. 975 milliGRAM(s) Oral every 6 hours PRN Mild Pain (1 - 3)  benzocaine 15 mG/menthol 3.6 mG (Sugar-Free) Lozenge 1 Lozenge Oral every 4 hours PRN Sore Throat  oxyCODONE    IR 5 milliGRAM(s) Oral every 4 hours PRN Moderate Pain (4 - 6)      Vital Signs Last 24 Hrs  T(C): 37 (14 Feb 2020 03:00), Max: 37.2 (13 Feb 2020 16:00)  T(F): 98.6 (14 Feb 2020 03:00), Max: 99 (13 Feb 2020 16:00)  HR: 94 (14 Feb 2020 07:00) (80 - 132)  BP: 102/69 (14 Feb 2020 07:00) (95/57 - 178/81)  BP(mean): 82 (14 Feb 2020 07:00) (72 - 116)  RR: 23 (14 Feb 2020 07:00) (16 - 33)  SpO2: 96% (14 Feb 2020 07:00) (66% - 100%)    PHYSICAL EXAM: VS as above     Constitutional: resting comforably in bed     Gastrointestinal: Abd wound open upper pole, weeping serous. Old ostomy site clean. Softly distended.     Genitourinary: voiding    Extremities: 1+ edema soft     I&O's Detail    13 Feb 2020 07:01  -  14 Feb 2020 07:00  --------------------------------------------------------  IN:    fat emulsion (Fish Oil and Plant Based) 20% Infusion: 187.2 mL    Oral Fluid: 900 mL    TPN (Total Parenteral Nutrition): 1743 mL  Total IN: 2830.2 mL    OUT:    Voided: 2075 mL  Total OUT: 2075 mL    Total NET: 755.2 mL          LABS:                        8.4    14.26 )-----------( 400      ( 14 Feb 2020 00:32 )             26.4     02-14    133<L>  |  100  |  15  ----------------------------<  129<H>  4.1   |  23  |  0.37<L>    Ca    7.9<L>      14 Feb 2020 00:32  Phos  3.5     02-14  Mg     1.7     02-14    TPro  6.1  /  Alb  2.5<L>  /  TBili  0.5  /  DBili  0.2  /  AST  22  /  ALT  50<H>  /  AlkPhos  90  02-14    PT/INR - ( 14 Feb 2020 00:32 )   PT: 14.5 sec;   INR: 1.26 ratio         PTT - ( 14 Feb 2020 00:32 )  PTT:36.2 sec

## 2020-02-14 NOTE — CHART NOTE - NSCHARTNOTEFT_GEN_A_CORE
Interval events: Patient transferred from SICU to floor    S: Patient doing well, passed one nonbloody bowel movement today. Patient OOB working with PT. Dressing changed, patient tolerated well.    O: Vital Signs  T(C): 36.2 (02-14 @ 11:15), Max: 37.3 (02-14 @ 08:00)  HR: 94 (02-14 @ 11:15) (80 - 132)  BP: 135/70 (02-14 @ 11:15) (95/57 - 178/81)  RR: 22 (02-14 @ 11:15) (16 - 33)  SpO2: 96% (02-14 @ 11:15) (66% - 100%)  02-13-20 @ 07:01  -  02-14-20 @ 07:00  --------------------------------------------------------  IN: 2830.2 mL / OUT: 2075 mL / NET: 755.2 mL    02-14-20 @ 07:01  -  02-14-20 @ 14:56  --------------------------------------------------------  IN: 469 mL / OUT: 900 mL / NET: -431 mL      General: alert and oriented, NAD  Resp: airway patent, respirations unlabored  CVS: regular rate and rhythm  Abdomen: soft, nontender, nondistended, dressing with moderate drainage, underlying midline incision with serosanguinous drainage from superior aspect, former ileostomy site clean with minimal serosanguinous drainage  Extremities: no edema  Skin: warm, dry, appropriate color                          8.4    14.26 )-----------( 400      ( 14 Feb 2020 00:32 )             26.4   02-14    133<L>  |  100  |  15  ----------------------------<  129<H>  4.1   |  23  |  0.37<L>    Ca    7.9<L>      14 Feb 2020 00:32  Phos  3.5     02-14  Mg     1.7     02-14    TPro  6.1  /  Alb  2.5<L>  /  TBili  0.5  /  DBili  0.2  /  AST  22  /  ALT  50<H>  /  AlkPhos  90  02-14    76 y/o M presenting with septic shock and high grade SBO s/p exploratory laparotomy, lysis of adhesions, decompression of bowel via enterotomy w/ primary repair, and Abthera VAC placement on 12/6; s/p take down of Abthera, washout and re-application of the Abthera vac on 12/8. 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 s/p L chest tube placement by IR 12/30 for pleural effusion now s/p VATS, with chest tube insertion for drainage of pleural effusion. RRT called 1/13 AM for hypoxia, patient transferred back to SICU.  Patient continued SOB, chest drain possible obstruction expanding. Patent taken back to OR emergently 1/15 for clot evac and VATS. Patient is now s/p Ileostomy reversal 2/4/20. Post-op c/b bloody BM and anemia requiring blood transfusion, improved. Now transferred from SICU to floor.      PLAN:  - H/H stable at 26.4  - Tolerating diet- once adequate po intake, recommend DC TPN and add nutritional supplements (ensure etc)  - Monitor BM frequency and appearance  - OOB with PT    Red Surgery [id number]

## 2020-02-14 NOTE — PROGRESS NOTE ADULT - SUBJECTIVE AND OBJECTIVE BOX
Follow-up Pulm Progress Note    No new respiratory events overnight.  Denies SOB/CP.   96% on RA    Medications:  MEDICATIONS  (STANDING):  albuterol/ipratropium for Nebulization. 3 milliLiter(s) Nebulizer every 6 hours  budesonide 160 MICROgram(s)/formoterol 4.5 MICROgram(s) Inhaler 2 Puff(s) Inhalation two times a day  chlorhexidine 2% Cloths 1 Application(s) Topical <User Schedule>  enoxaparin Injectable 40 milliGRAM(s) SubCutaneous daily  ergocalciferol 85292 Unit(s) Oral every week  fat emulsion (Fish Oil and Plant Based) 20% Infusion 20.8 mL/Hr (20.8 mL/Hr) IV Continuous <Continuous>  guaiFENesin  milliGRAM(s) Oral every 12 hours  metoprolol tartrate 12.5 milliGRAM(s) Oral every 12 hours  nystatin/triamcinolone Cream 1 Application(s) Topical two times a day  pantoprazole   Suspension 40 milliGRAM(s) Oral daily  Parenteral Nutrition - Adult 1 Each (83 mL/Hr) TPN Continuous <Continuous>  Parenteral Nutrition - Adult 1 Each (83 mL/Hr) TPN Continuous <Continuous>  tamsulosin 0.4 milliGRAM(s) Oral at bedtime  triamcinolone 0.1% Ointment 1 Application(s) Topical two times a day    MEDICATIONS  (PRN):  acetaminophen   Tablet .. 975 milliGRAM(s) Oral every 6 hours PRN Mild Pain (1 - 3)  benzocaine 15 mG/menthol 3.6 mG (Sugar-Free) Lozenge 1 Lozenge Oral every 4 hours PRN Sore Throat  oxyCODONE    IR 5 milliGRAM(s) Oral every 4 hours PRN Moderate Pain (4 - 6)          Vital Signs Last 24 Hrs  T(C): 36.2 (14 Feb 2020 11:15), Max: 37.3 (14 Feb 2020 08:00)  T(F): 97.2 (14 Feb 2020 11:15), Max: 99.1 (14 Feb 2020 08:00)  HR: 94 (14 Feb 2020 11:15) (80 - 132)  BP: 135/70 (14 Feb 2020 11:15) (95/57 - 178/81)  BP(mean): 93 (14 Feb 2020 10:00) (72 - 116)  RR: 22 (14 Feb 2020 11:15) (16 - 33)  SpO2: 96% (14 Feb 2020 11:15) (66% - 100%) on RA          02-13 @ 07:01  -  02-14 @ 07:00  --------------------------------------------------------  IN: 2830.2 mL / OUT: 2075 mL / NET: 755.2 mL          LABS:                        8.4    14.26 )-----------( 400      ( 14 Feb 2020 00:32 )             26.4     02-14    133<L>  |  100  |  15  ----------------------------<  129<H>  4.1   |  23  |  0.37<L>    Ca    7.9<L>      14 Feb 2020 00:32  Phos  3.5     02-14  Mg     1.7     02-14    TPro  6.1  /  Alb  2.5<L>  /  TBili  0.5  /  DBili  0.2  /  AST  22  /  ALT  50<H>  /  AlkPhos  90  02-14          CAPILLARY BLOOD GLUCOSE        PT/INR - ( 14 Feb 2020 00:32 )   PT: 14.5 sec;   INR: 1.26 ratio         PTT - ( 14 Feb 2020 00:32 )  PTT:36.2 sec            Physical Examination:  PULM: Diminished BS throughout   CVS: S1, S2 heard    RADIOLOGY REVIEWED  CXR: L costophrenic angle off film  L atelectasis

## 2020-02-14 NOTE — PROGRESS NOTE ADULT - SUBJECTIVE AND OBJECTIVE BOX
Subjective: Patient seen and examined. No new events except as noted.   Moved out of ICU     REVIEW OF SYSTEMS:    CONSTITUTIONAL:+ weakness, fevers or chills  EYES/ENT: No visual changes;  No vertigo or throat pain   NECK: No pain or stiffness  RESPIRATORY: No cough, wheezing, hemoptysis; No shortness of breath  CARDIOVASCULAR: No chest pain or palpitations  GASTROINTESTINAL: No abdominal or epigastric pain. No nausea, vomiting, or hematemesis; No diarrhea or constipation. No melena or hematochezia.  GENITOURINARY: No dysuria, frequency or hematuria  NEUROLOGICAL: No numbness or weakness  SKIN: No itching, burning, rashes, or lesions   All other review of systems is negative unless indicated above.    MEDICATIONS:  MEDICATIONS  (STANDING):  albuterol/ipratropium for Nebulization. 3 milliLiter(s) Nebulizer every 6 hours  buDESOnide    Inhalation Suspension 0.5 milliGRAM(s) Inhalation every 12 hours  chlorhexidine 2% Cloths 1 Application(s) Topical <User Schedule>  enoxaparin Injectable 40 milliGRAM(s) SubCutaneous daily  ergocalciferol 82674 Unit(s) Oral every week  fat emulsion (Fish Oil and Plant Based) 20% Infusion 20.8 mL/Hr (20.8 mL/Hr) IV Continuous <Continuous>  guaiFENesin  milliGRAM(s) Oral every 12 hours  metoprolol tartrate 12.5 milliGRAM(s) Oral every 12 hours  nystatin/triamcinolone Cream 1 Application(s) Topical two times a day  pantoprazole   Suspension 40 milliGRAM(s) Oral daily  Parenteral Nutrition - Adult 1 Each (83 mL/Hr) TPN Continuous <Continuous>  Parenteral Nutrition - Adult 1 Each (83 mL/Hr) TPN Continuous <Continuous>  tamsulosin 0.4 milliGRAM(s) Oral at bedtime  triamcinolone 0.1% Ointment 1 Application(s) Topical two times a day      PHYSICAL EXAM:  T(C): 36.2 (02-14-20 @ 11:15), Max: 37.3 (02-14-20 @ 08:00)  HR: 94 (02-14-20 @ 11:15) (80 - 132)  BP: 135/70 (02-14-20 @ 11:15) (95/57 - 178/81)  RR: 22 (02-14-20 @ 11:15) (16 - 33)  SpO2: 96% (02-14-20 @ 11:15) (66% - 100%)  Wt(kg): --  I&O's Summary    13 Feb 2020 07:01  -  14 Feb 2020 07:00  --------------------------------------------------------  IN: 2830.2 mL / OUT: 2075 mL / NET: 755.2 mL    14 Feb 2020 07:01  -  14 Feb 2020 12:33  --------------------------------------------------------  IN: 349 mL / OUT: 600 mL / NET: -251 mL          Appearance: Normal	  HEENT:   Normal oral mucosa, PERRL, EOMI	  Lymphatic: No lymphadenopathy , no edema  Cardiovascular: Normal S1 S2, No JVD, No murmurs , Peripheral pulses palpable 2+ bilaterally  Respiratory: Lungs clear to auscultation, normal effort 	  Gastrointestinal:  Soft, Non-tender, + BS	  Skin: No rashes, No ecchymoses, No cyanosis, warm to touch  Musculoskeletal: Normal range of motion, normal strength  Psychiatry:  Mood & affect appropriate  Ext: No edema      LABS:    CARDIAC MARKERS:                                8.4    14.26 )-----------( 400      ( 14 Feb 2020 00:32 )             26.4     02-14    133<L>  |  100  |  15  ----------------------------<  129<H>  4.1   |  23  |  0.37<L>    Ca    7.9<L>      14 Feb 2020 00:32  Phos  3.5     02-14  Mg     1.7     02-14    TPro  6.1  /  Alb  2.5<L>  /  TBili  0.5  /  DBili  0.2  /  AST  22  /  ALT  50<H>  /  AlkPhos  90  02-14    proBNP:   Lipid Profile:   HgA1c:   TSH:             TELEMETRY: 	    ECG:  	  RADIOLOGY:   DIAGNOSTIC TESTING:  [ ] Echocardiogram:  [ ]  Catheterization:  [ ] Stress Test:    OTHER:

## 2020-02-14 NOTE — PROGRESS NOTE ADULT - ASSESSMENT
75 y/o male presenting with high grade SBO s/p exploratory laparotomy, lysis of adhesions, decompression of bowel via enterotomy w/ primary repair, & Abthera VAC placement (12/06/2019); RTOR for re-exploration w/ Abthera VAC replacement (12/08/2019) to allow for demarcation of ischemic small bowel; RTOR for re-exploration, small bowel resection of 150 cm (150 cm remaining), ileocecetomy, end ileostomy, mucous fistula, and abdominal closure (12/10/2019); hospital course complicated by SVT, short bowel syndrome requiring repletions w/ IV fluids, malnutrition requiring TPN, intermittent episodes of hypotension, spontaneous left pneumothorax s/p pigtail catheter (12/15/2019-12/24/2019), left pleural effusion s/p pigtail catheter w/ IR (12/30/2019) & subsequent placement of Pleur-X catheter (1/10/2020) c/b left hemothorax s/p left VATS, dysphagia, and oral HSV lesions; now s/p ileostomy reversal. S/p clean EGD and colonoscopy and several days free of melena with stable crits.     PLAN:    Neuro: acute post-op pain  - Monitor mental status  - Pain control as needed with oral acetaminophen and oxycodone    Resp: COPD, spontaneous left pneumothorax s/p pigtail catheter, left pleural effusion s/p Pleur-X catheter c/b hemothorax s/p left VATS  - Monitor pulse oximeter  - Pulmicort and Duoneb for COPD  - Guaifenesin as needed for cough  - Will need outpatient follow-up with a pulmonologist for his COPD as patient does not currently have one    CV: SVT  - Monitor vital signs    GI: s/p exploratory laparotomy, [place], decompression of bowel via enterotomy w/ primary repair, & Abthera VAC placement (12/06/2019); re-exploration w/ ABthera VAC replacement (12/08/2019); s/p re-exploration, small bowel resection of 150 cm (150 cm remaining), ileocecectomy end ileostomy, mucous fistula, & abdominal closure (12/10/2019); s/p ileostomy reversal (2/4/2020); short bowel syndrome, malnutrition, dysphagia, melena  - low fiber diet and TPN. Advance as tolerated  - Contact Matty Chacko & MU Siegel (nutrition support) regarding possibility of D/Cing TPN  - Protonix suspension in the setting of GI bleed    Renal: urinary retention, polyuria (resolved)  - Monitor I&Os  - Monitor electrolytes and replete as necessary  - Flomax for urinary retention    Heme: anemia likely secondary to malnutrition / malabsorption  - Monitor CBC and coags  - Venodynes for mechanical VTE prophylaxis. Lovenox 40mg SubQ QD.  - follow up venous duplex for possibility of DVT    ID: oral HSV lesions (s/p acyclovir 12/23/2019-01/02/2020), Pseudomonas PNA (s/p meropenem 1/19/2020-1/26/2020)  - Monitor for clinical evidence of active infection    Endo: no acute issues  - Monitor glucose on BMP    Disposition:  - Full code  - Will remain in SICU

## 2020-02-15 LAB
ALBUMIN SERPL ELPH-MCNC: 2.5 G/DL — LOW (ref 3.3–5)
ALP SERPL-CCNC: 90 U/L — SIGNIFICANT CHANGE UP (ref 40–120)
ALT FLD-CCNC: 74 U/L — HIGH (ref 10–45)
ANION GAP SERPL CALC-SCNC: 12 MMOL/L — SIGNIFICANT CHANGE UP (ref 5–17)
AST SERPL-CCNC: 38 U/L — SIGNIFICANT CHANGE UP (ref 10–40)
BASOPHILS # BLD AUTO: 0.02 K/UL — SIGNIFICANT CHANGE UP (ref 0–0.2)
BASOPHILS NFR BLD AUTO: 0.1 % — SIGNIFICANT CHANGE UP (ref 0–2)
BILIRUB SERPL-MCNC: 0.4 MG/DL — SIGNIFICANT CHANGE UP (ref 0.2–1.2)
BUN SERPL-MCNC: 17 MG/DL — SIGNIFICANT CHANGE UP (ref 7–23)
CA-I BLD-SCNC: 1.17 MMOL/L — SIGNIFICANT CHANGE UP (ref 1.12–1.3)
CALCIUM SERPL-MCNC: 8.2 MG/DL — LOW (ref 8.4–10.5)
CHLORIDE SERPL-SCNC: 100 MMOL/L — SIGNIFICANT CHANGE UP (ref 96–108)
CO2 SERPL-SCNC: 22 MMOL/L — SIGNIFICANT CHANGE UP (ref 22–31)
CREAT SERPL-MCNC: 0.41 MG/DL — LOW (ref 0.5–1.3)
EOSINOPHIL # BLD AUTO: 0.39 K/UL — SIGNIFICANT CHANGE UP (ref 0–0.5)
EOSINOPHIL NFR BLD AUTO: 2.5 % — SIGNIFICANT CHANGE UP (ref 0–6)
GLUCOSE SERPL-MCNC: 94 MG/DL — SIGNIFICANT CHANGE UP (ref 70–99)
HCT VFR BLD CALC: 25.4 % — LOW (ref 39–50)
HGB BLD-MCNC: 8.1 G/DL — LOW (ref 13–17)
IMM GRANULOCYTES NFR BLD AUTO: 1.3 % — SIGNIFICANT CHANGE UP (ref 0–1.5)
LYMPHOCYTES # BLD AUTO: 1.73 K/UL — SIGNIFICANT CHANGE UP (ref 1–3.3)
LYMPHOCYTES # BLD AUTO: 11 % — LOW (ref 13–44)
MAGNESIUM SERPL-MCNC: 1.9 MG/DL — SIGNIFICANT CHANGE UP (ref 1.6–2.6)
MCHC RBC-ENTMCNC: 30 PG — SIGNIFICANT CHANGE UP (ref 27–34)
MCHC RBC-ENTMCNC: 31.9 GM/DL — LOW (ref 32–36)
MCV RBC AUTO: 94.1 FL — SIGNIFICANT CHANGE UP (ref 80–100)
MONOCYTES # BLD AUTO: 1.4 K/UL — HIGH (ref 0–0.9)
MONOCYTES NFR BLD AUTO: 8.9 % — SIGNIFICANT CHANGE UP (ref 2–14)
NEUTROPHILS # BLD AUTO: 11.94 K/UL — HIGH (ref 1.8–7.4)
NEUTROPHILS NFR BLD AUTO: 76.2 % — SIGNIFICANT CHANGE UP (ref 43–77)
NRBC # BLD: 0 /100 WBCS — SIGNIFICANT CHANGE UP (ref 0–0)
PHOSPHATE SERPL-MCNC: 4.2 MG/DL — SIGNIFICANT CHANGE UP (ref 2.5–4.5)
PLATELET # BLD AUTO: 429 K/UL — HIGH (ref 150–400)
POTASSIUM SERPL-MCNC: 4.1 MMOL/L — SIGNIFICANT CHANGE UP (ref 3.5–5.3)
POTASSIUM SERPL-SCNC: 4.1 MMOL/L — SIGNIFICANT CHANGE UP (ref 3.5–5.3)
PREALB SERPL-MCNC: 16 MG/DL — LOW (ref 20–40)
PROT SERPL-MCNC: 6.3 G/DL — SIGNIFICANT CHANGE UP (ref 6–8.3)
RBC # BLD: 2.7 M/UL — LOW (ref 4.2–5.8)
RBC # FLD: 14.5 % — SIGNIFICANT CHANGE UP (ref 10.3–14.5)
SODIUM SERPL-SCNC: 134 MMOL/L — LOW (ref 135–145)
TRIGL SERPL-MCNC: 44 MG/DL — SIGNIFICANT CHANGE UP (ref 10–149)
VASOPRESSIN SERPL-MCNC: 2.4 PG/ML — SIGNIFICANT CHANGE UP (ref 0–4.7)
WBC # BLD: 15.68 K/UL — HIGH (ref 3.8–10.5)
WBC # FLD AUTO: 15.68 K/UL — HIGH (ref 3.8–10.5)

## 2020-02-15 PROCEDURE — 99233 SBSQ HOSP IP/OBS HIGH 50: CPT

## 2020-02-15 RX ORDER — ELECTROLYTE SOLUTION,INJ
1 VIAL (ML) INTRAVENOUS
Refills: 0 | Status: DISCONTINUED | OUTPATIENT
Start: 2020-02-15 | End: 2020-02-15

## 2020-02-15 RX ORDER — MAGNESIUM SULFATE 500 MG/ML
2 VIAL (ML) INJECTION ONCE
Refills: 0 | Status: COMPLETED | OUTPATIENT
Start: 2020-02-15 | End: 2020-02-15

## 2020-02-15 RX ORDER — I.V. FAT EMULSION 20 G/100ML
20.8 EMULSION INTRAVENOUS
Qty: 50 | Refills: 0 | Status: DISCONTINUED | OUTPATIENT
Start: 2020-02-15 | End: 2020-02-16

## 2020-02-15 RX ORDER — SIMETHICONE 80 MG/1
80 TABLET, CHEWABLE ORAL ONCE
Refills: 0 | Status: COMPLETED | OUTPATIENT
Start: 2020-02-15 | End: 2020-02-15

## 2020-02-15 RX ADMIN — Medication 3 MILLILITER(S): at 11:33

## 2020-02-15 RX ADMIN — SIMETHICONE 80 MILLIGRAM(S): 80 TABLET, CHEWABLE ORAL at 21:45

## 2020-02-15 RX ADMIN — Medication 3 MILLILITER(S): at 17:06

## 2020-02-15 RX ADMIN — Medication 50 GRAM(S): at 14:17

## 2020-02-15 RX ADMIN — OXYCODONE HYDROCHLORIDE 5 MILLIGRAM(S): 5 TABLET ORAL at 00:52

## 2020-02-15 RX ADMIN — OXYCODONE HYDROCHLORIDE 5 MILLIGRAM(S): 5 TABLET ORAL at 23:30

## 2020-02-15 RX ADMIN — Medication 975 MILLIGRAM(S): at 22:59

## 2020-02-15 RX ADMIN — ENOXAPARIN SODIUM 40 MILLIGRAM(S): 100 INJECTION SUBCUTANEOUS at 11:33

## 2020-02-15 RX ADMIN — OXYCODONE HYDROCHLORIDE 5 MILLIGRAM(S): 5 TABLET ORAL at 23:00

## 2020-02-15 RX ADMIN — Medication 1 APPLICATION(S): at 17:08

## 2020-02-15 RX ADMIN — PANTOPRAZOLE SODIUM 40 MILLIGRAM(S): 20 TABLET, DELAYED RELEASE ORAL at 11:33

## 2020-02-15 RX ADMIN — Medication 975 MILLIGRAM(S): at 23:30

## 2020-02-15 RX ADMIN — Medication 12.5 MILLIGRAM(S): at 17:07

## 2020-02-15 RX ADMIN — Medication 600 MILLIGRAM(S): at 05:12

## 2020-02-15 RX ADMIN — CHLORHEXIDINE GLUCONATE 1 APPLICATION(S): 213 SOLUTION TOPICAL at 08:48

## 2020-02-15 RX ADMIN — Medication 600 MILLIGRAM(S): at 17:07

## 2020-02-15 RX ADMIN — Medication 1 EACH: at 17:09

## 2020-02-15 RX ADMIN — BUDESONIDE AND FORMOTEROL FUMARATE DIHYDRATE 2 PUFF(S): 160; 4.5 AEROSOL RESPIRATORY (INHALATION) at 05:12

## 2020-02-15 RX ADMIN — BUDESONIDE AND FORMOTEROL FUMARATE DIHYDRATE 2 PUFF(S): 160; 4.5 AEROSOL RESPIRATORY (INHALATION) at 17:07

## 2020-02-15 RX ADMIN — Medication 1 APPLICATION(S): at 05:13

## 2020-02-15 RX ADMIN — I.V. FAT EMULSION 20.8 ML/HR: 20 EMULSION INTRAVENOUS at 17:08

## 2020-02-15 RX ADMIN — Medication 975 MILLIGRAM(S): at 00:52

## 2020-02-15 RX ADMIN — Medication 3 MILLILITER(S): at 23:00

## 2020-02-15 RX ADMIN — Medication 3 MILLILITER(S): at 05:12

## 2020-02-15 RX ADMIN — TAMSULOSIN HYDROCHLORIDE 0.4 MILLIGRAM(S): 0.4 CAPSULE ORAL at 21:46

## 2020-02-15 RX ADMIN — Medication 1 APPLICATION(S): at 05:12

## 2020-02-15 RX ADMIN — Medication 1 APPLICATION(S): at 17:07

## 2020-02-15 NOTE — PROGRESS NOTE ADULT - SUBJECTIVE AND OBJECTIVE BOX
Subjective: Patient seen and examined. No new events except as noted.   resting comfortably     REVIEW OF SYSTEMS:    CONSTITUTIONAL:+ weakness, fevers or chills  EYES/ENT: No visual changes;  No vertigo or throat pain   NECK: No pain or stiffness  RESPIRATORY: No cough, wheezing, hemoptysis; No shortness of breath  CARDIOVASCULAR: No chest pain or palpitations  GASTROINTESTINAL: No abdominal or epigastric pain. No nausea, vomiting, or hematemesis; No diarrhea or constipation. No melena or hematochezia.  GENITOURINARY: No dysuria, frequency or hematuria  NEUROLOGICAL: No numbness or weakness  SKIN: No itching, burning, rashes, or lesions   All other review of systems is negative unless indicated above.    MEDICATIONS:  MEDICATIONS  (STANDING):  albuterol/ipratropium for Nebulization. 3 milliLiter(s) Nebulizer every 6 hours  budesonide 160 MICROgram(s)/formoterol 4.5 MICROgram(s) Inhaler 2 Puff(s) Inhalation two times a day  chlorhexidine 2% Cloths 1 Application(s) Topical <User Schedule>  enoxaparin Injectable 40 milliGRAM(s) SubCutaneous daily  ergocalciferol 93051 Unit(s) Oral every week  fat emulsion (Fish Oil and Plant Based) 20% Infusion 20.8 mL/Hr (20.8 mL/Hr) IV Continuous <Continuous>  guaiFENesin  milliGRAM(s) Oral every 12 hours  metoprolol tartrate 12.5 milliGRAM(s) Oral every 12 hours  nystatin/triamcinolone Cream 1 Application(s) Topical two times a day  pantoprazole   Suspension 40 milliGRAM(s) Oral daily  Parenteral Nutrition - Adult 1 Each (83 mL/Hr) TPN Continuous <Continuous>  tamsulosin 0.4 milliGRAM(s) Oral at bedtime  triamcinolone 0.1% Ointment 1 Application(s) Topical two times a day      PHYSICAL EXAM:  T(C): 37.4 (02-15-20 @ 17:03), Max: 37.4 (02-15-20 @ 17:03)  HR: 100 (02-15-20 @ 17:03) (90 - 100)  BP: 149/79 (02-15-20 @ 17:03) (104/62 - 150/69)  RR: 18 (02-15-20 @ 17:03) (18 - 18)  SpO2: 96% (02-15-20 @ 17:03) (95% - 98%)  Wt(kg): --  I&O's Summary    14 Feb 2020 07:01  -  15 Feb 2020 07:00  --------------------------------------------------------  IN: 2440.8 mL / OUT: 1650 mL / NET: 790.8 mL    15 Feb 2020 07:01  -  15 Feb 2020 20:08  --------------------------------------------------------  IN: 1506.8 mL / OUT: 1275 mL / NET: 231.8 mL          Appearance: Normal	  HEENT:   Normal oral mucosa, PERRL, EOMI	  Lymphatic: No lymphadenopathy , no edema  Cardiovascular: Normal S1 S2, No JVD, No murmurs , Peripheral pulses palpable 2+ bilaterally  Respiratory: Lungs clear to auscultation, normal effort 	  Gastrointestinal:  Soft, Non-tender, + BS	  Skin: No rashes, No ecchymoses, No cyanosis, warm to touch  Musculoskeletal: Normal range of motion, normal strength  Psychiatry:  Mood & affect appropriate  Ext: No edema      LABS:    CARDIAC MARKERS:                                8.1    15.68 )-----------( 429      ( 15 Feb 2020 07:17 )             25.4     02-15    134<L>  |  100  |  17  ----------------------------<  94  4.1   |  22  |  0.41<L>    Ca    8.2<L>      15 Feb 2020 07:17  Phos  4.2     02-15  Mg     1.9     02-15    TPro  6.3  /  Alb  2.5<L>  /  TBili  0.4  /  DBili  x   /  AST  38  /  ALT  74<H>  /  AlkPhos  90  02-15    proBNP:   Lipid Profile:   HgA1c:   TSH:             TELEMETRY: 	    ECG:  	  RADIOLOGY:   DIAGNOSTIC TESTING:  [ ] Echocardiogram:  [ ]  Catheterization:  [ ] Stress Test:    OTHER:

## 2020-02-15 NOTE — PROGRESS NOTE ADULT - ASSESSMENT
74 y/o M presenting with septic shock and high grade SBO s/p exploratory laparotomy, lysis of adhesions, decompression of bowel via enterotomy w/ primary repair, and Abthera VAC placement on 12/6; s/p take down of Abthera, washout and re-application of the Abthera vac on 12/8. 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 s/p L chest tube placement by IR 12/30 for pleural effusion now s/p VATS, with chest tube insertion for drainage of pleural effusion. RRT called 1/13 AM for hypoxia, patient transferred back to SICU.  Patient continued SOB, chest drain possible obstruction expanding. Patent taken back to OR emergently 1/15 for clot evac and VATS. Patient is now s/p Ileostomy reversal 2/4/20. Post-op c/b bloody BM and anemia requiring blood transfusion, now improved.      PLAN:  - Tolerating diet- once adequate po intake, recommend DC TPN and add nutritional supplements (ensure etc)  - BID dressing changes (minimal gauze and tape)  - Monitor BM frequency and appearance  - Pain control as needed  - OOB with PT    [facility]  [id number]

## 2020-02-15 NOTE — PROGRESS NOTE ADULT - PROBLEM SELECTOR PLAN 1
s/p ex lap, MIRYAM, closure of enterotomy, abthera vac placement  s/p washout  s/p Ileostomy reversal    Advance as tolerated   pain control   s/p PICC line placement . On TPN

## 2020-02-15 NOTE — PROGRESS NOTE ADULT - SUBJECTIVE AND OBJECTIVE BOX
[facility] NUTRITION SUPPORT--  Attending/ PA FOLLOW UP NOTE      24 hour events/subjective:Tolerating TPN at goal  without issues: Denies palpitations, chest pain, shortness of breath. Denies nausea nor vomiting nor abdominal pain. Reports diarrhea and flatus, reports tolerating clears.     Pt s/p EGD 2/12 for episodes melena, and showed:  - Normal upper third of esophagus and middle third of esophagus.  - 4 cm hiatus hernia.  - Erythematous mucosa in the gastric body.  - Nodular mucosa in the gastric fundus and in the gastric body. Biopsied.  - Normal examined duodenum.  - No evidence of GI bleeding seen in the upper GI tract.   - Await pathology results.            Colonoscopy showed:   - Preparation of the colon was inadequate.   - Stool in the entire examined colon.  - The examined portion of the ileum was normal.  - Patent end-to-side ileo-colonic anastomosis, characterized by friable mucosa and an intact appearance. No active bleeding or stigmata of bleeding was seen.  - Diverticulosis in the sigmoid colon.   - No specimens collected.                                                                                               PAST HISTORY  --------------------------------------------------------------------------------  No significant changes to PMH, PSH, FHx, SHx, unless otherwise noted    ALLERGIES & MEDICATIONS  --------------------------------------------------------------------------------  Allergies    IV Contrast (Hives)    Intolerances      Standing Inpatient Medications  albuterol/ipratropium for Nebulization. 3 milliLiter(s) Nebulizer every 6 hours  budesonide 160 MICROgram(s)/formoterol 4.5 MICROgram(s) Inhaler 2 Puff(s) Inhalation two times a day  chlorhexidine 2% Cloths 1 Application(s) Topical <User Schedule>  enoxaparin Injectable 40 milliGRAM(s) SubCutaneous daily  ergocalciferol 68494 Unit(s) Oral every week  fat emulsion (Fish Oil and Plant Based) 20% Infusion 20.8 mL/Hr IV Continuous <Continuous>  guaiFENesin  milliGRAM(s) Oral every 12 hours  metoprolol tartrate 12.5 milliGRAM(s) Oral every 12 hours  nystatin/triamcinolone Cream 1 Application(s) Topical two times a day  pantoprazole   Suspension 40 milliGRAM(s) Oral daily  Parenteral Nutrition - Adult 1 Each TPN Continuous <Continuous>  tamsulosin 0.4 milliGRAM(s) Oral at bedtime  triamcinolone 0.1% Ointment 1 Application(s) Topical two times a day    PRN Inpatient Medications  acetaminophen   Tablet .. 975 milliGRAM(s) Oral every 6 hours PRN  benzocaine 15 mG/menthol 3.6 mG (Sugar-Free) Lozenge 1 Lozenge Oral every 4 hours PRN  oxyCODONE    IR 5 milliGRAM(s) Oral every 4 hours PRN      REVIEW OF SYSTEMS  --------------------------------------------------------------------------------  Gen: as per HPI  Skin: No rashes  Head/Eyes/Ears/Mouth: No headache;No sore throat  Respiratory: No dyspnea, cough,   CV: No chest pain, PND, orthopnea  GI: as per HPI  : No increased frequency, dysuria, hematuria, nocturia  MSK: No joint pain/swelling; no back pain; no edema  Neuro: No dizziness/lightheadedness, weakness, seizures, numbness, tingling  Psych: No significant nervousness, anxiety, stress, depression    All other systems were reviewed and are negative, except as noted.      LABS/STUDIES  --------------------------------------------------------------------------------              8.1    15.68 >-----------<  429      [02-15-20 @ 07:17]              25.4     133  |  100  |  15  ----------------------------<  129      [02-14-20 @ 00:32]  4.1   |  23  |  0.37        Ca     7.9     [02-14-20 @ 00:32]      iCa    1.17     [02-15 @ 07:29]      Mg     1.7     [02-14-20 @ 00:32]      Phos  3.5     [02-14-20 @ 00:32]    TPro  6.1  /  Alb  2.5  /  TBili  0.5  /  DBili  0.2  /  AST  22  /  ALT  50  /  AlkPhos  90  [02-14-20 @ 00:32]    PT/INR: PT 14.5 , INR 1.26       [02-14-20 @ 00:32]  PTT: 36.2       [02-14-20 @ 00:32]          PrealbuminPrealbumin, Serum: 17 mg/dL (02-10-20 @ 09:15)  Prealbumin, Serum: 21 mg/dL (02-03-20 @ 23:50)  Prealbumin, Serum: 19 mg/dL (02-03-20 @ 07:18)  Prealbumin, Serum: 18 mg/dL (02-02-20 @ 09:48)  Prealbumin, Serum: 18 mg/dL (01-31-20 @ 07:40)    Triglycerides      02-14-20 @ 07:01  -  02-15-20 @ 07:00  --------------------------------------------------------  IN: 2440.8 mL / OUT: 1650 mL / NET: 790.8 mL        VITALS/PHYSICAL EXAM  --------------------------------------------------------------------------------  T(C): 36.9 (02-15-20 @ 04:55), Max: 37.3 (02-14-20 @ 08:00)  HR: 96 (02-15-20 @ 04:55) (91 - 99)  BP: 104/62 (02-15-20 @ 04:55) (104/62 - 150/69)  RR: 18 (02-15-20 @ 04:55) (18 - 27)  SpO2: 95% (02-15-20 @ 04:55) (92% - 97%)  Wt(kg): --      	Gen: WD NAD, A&Ox3  	HEENT: NC/AT, PERRL              Neck, trachea midline,               Abd: softly distended, nontender, midline & Rt sided old ostomy site dressing c/d/i              MSK/Vascular: FAROM x4, Equally Warm,  no edema, no clubbing, cyanosis,  LLE w/ brace                      RUE PICC w/o sx infection  	Neuro: No focal deficits, grossly intact sensation, & strength   	Psych: Normal affect and mood              Skin: good turgor [facility] NUTRITION SUPPORT--  Attending/ PA FOLLOW UP NOTE      24 hour events/subjective: Pt transfered to the floor last night from the 8ICU wihtout incident. Tolerating TPN at goal  without issues: Denies palpitations, chest pain, shortness of breath. Denies nausea nor vomiting nor abdominal pain. Reports BMs and flatus. Denies any bloody BMS. Reports tolerating two trays of LRD, spaghetti last night, eggs and a sausage link this AM.    EGD 2/12 for episodes melena, and showed:  - Normal upper third of esophagus and middle third of esophagus.  - 4 cm hiatus hernia.  - Erythematous mucosa in the gastric body.  - Nodular mucosa in the gastric fundus and in the gastric body. Biopsied.  - Normal examined duodenum.  - No evidence of GI bleeding seen in the upper GI tract.   - Await pathology results.            Colonoscopy 2/12 showed:   - Preparation of the colon was inadequate.   - Stool in the entire examined colon.  - The examined portion of the ileum was normal.  - Patent end-to-side ileo-colonic anastomosis, characterized by friable mucosa and an intact appearance. No active bleeding or stigmata of bleeding was seen.  - Diverticulosis in the sigmoid colon.   - No specimens collected.                                                                                               PAST HISTORY  --------------------------------------------------------------------------------  No significant changes to PMH, PSH, FHx, SHx, unless otherwise noted    ALLERGIES & MEDICATIONS  --------------------------------------------------------------------------------  Allergies    IV Contrast (Hives)    Intolerances      Standing Inpatient Medications  albuterol/ipratropium for Nebulization. 3 milliLiter(s) Nebulizer every 6 hours  budesonide 160 MICROgram(s)/formoterol 4.5 MICROgram(s) Inhaler 2 Puff(s) Inhalation two times a day  chlorhexidine 2% Cloths 1 Application(s) Topical <User Schedule>  enoxaparin Injectable 40 milliGRAM(s) SubCutaneous daily  ergocalciferol 92107 Unit(s) Oral every week  fat emulsion (Fish Oil and Plant Based) 20% Infusion 20.8 mL/Hr IV Continuous <Continuous>  guaiFENesin  milliGRAM(s) Oral every 12 hours  metoprolol tartrate 12.5 milliGRAM(s) Oral every 12 hours  nystatin/triamcinolone Cream 1 Application(s) Topical two times a day  pantoprazole   Suspension 40 milliGRAM(s) Oral daily  Parenteral Nutrition - Adult 1 Each TPN Continuous <Continuous>  tamsulosin 0.4 milliGRAM(s) Oral at bedtime  triamcinolone 0.1% Ointment 1 Application(s) Topical two times a day    PRN Inpatient Medications  acetaminophen   Tablet .. 975 milliGRAM(s) Oral every 6 hours PRN  benzocaine 15 mG/menthol 3.6 mG (Sugar-Free) Lozenge 1 Lozenge Oral every 4 hours PRN  oxyCODONE    IR 5 milliGRAM(s) Oral every 4 hours PRN      REVIEW OF SYSTEMS  --------------------------------------------------------------------------------  Gen: as per HPI  Skin: No rashes  Head/Eyes/Ears/Mouth: No headache;No sore throat  Respiratory: No dyspnea, cough,   CV: No chest pain, PND, orthopnea  GI: as per HPI  : No increased frequency, dysuria, hematuria, nocturia  MSK: No joint pain/swelling; no back pain; no edema  Neuro: No dizziness/lightheadedness, weakness, seizures, numbness, tingling  Psych: No significant nervousness, anxiety, stress, depression    All other systems were reviewed and are negative, except as noted.      LABS/STUDIES  --------------------------------------------------------------------------------              8.1    15.68 >-----------<  429      [02-15-20 @ 07:17]              25.4     133  |  100  |  15  ----------------------------<  129      [02-14-20 @ 00:32]  4.1   |  23  |  0.37        Ca     7.9     [02-14-20 @ 00:32]      iCa    1.17     [02-15 @ 07:29]      Mg     1.7     [02-14-20 @ 00:32]      Phos  3.5     [02-14-20 @ 00:32]    TPro  6.1  /  Alb  2.5  /  TBili  0.5  /  DBili  0.2  /  AST  22  /  ALT  50  /  AlkPhos  90  [02-14-20 @ 00:32]    PT/INR: PT 14.5 , INR 1.26       [02-14-20 @ 00:32]  PTT: 36.2       [02-14-20 @ 00:32]          PrealbuminPrealbumin, Serum: 17 mg/dL (02-10-20 @ 09:15)  Prealbumin, Serum: 21 mg/dL (02-03-20 @ 23:50)  Prealbumin, Serum: 19 mg/dL (02-03-20 @ 07:18)  Prealbumin, Serum: 18 mg/dL (02-02-20 @ 09:48)  Prealbumin, Serum: 18 mg/dL (01-31-20 @ 07:40)    Triglycerides      02-14-20 @ 07:01  -  02-15-20 @ 07:00  --------------------------------------------------------  IN: 2440.8 mL / OUT: 1650 mL / NET: 790.8 mL        VITALS/PHYSICAL EXAM  --------------------------------------------------------------------------------  T(C): 36.9 (02-15-20 @ 04:55), Max: 37.3 (02-14-20 @ 08:00)  HR: 96 (02-15-20 @ 04:55) (91 - 99)  BP: 104/62 (02-15-20 @ 04:55) (104/62 - 150/69)  RR: 18 (02-15-20 @ 04:55) (18 - 27)  SpO2: 95% (02-15-20 @ 04:55) (92% - 97%)  Wt(kg): --      	Gen: WD NAD, A&Ox3  	HEENT: NC/AT, PERRL              Neck, trachea midline,               Abd: softly distended, nontender, midline & Rt sided old ostomy site dressing c/d/i              MSK/Vascular: FAROM x4, Equally Warm,  no edema, no clubbing, cyanosis,  LLE w/ brace                      RUE PICC w/o sx infection  	Neuro: No focal deficits, grossly intact sensation, & strength   	Psych: Normal affect and mood              Skin: good turgor

## 2020-02-15 NOTE — PROGRESS NOTE ADULT - ASSESSMENT
75 year old male w/ PMH of COPD and EtOH dependence with PSH/o appy, prostate surgery, & spine surgery  septic shock and high grade SBO s/p exploratory laparotomy, lysis of adhesions, decompression of bowel via enterotomy w/ primary repair, and Abthera VAC placement on 12/6; s/p take down of Abthera, washout and re-application of the Abthera vac on 12/8. Now s/p SBR and end ileostomy on 12/10.   TPN initally consulted to assist w/ management of pt's nutrition in pt with SGS/malabsorption and had high Ileostomy output.  Pt s/p Lt Chest Pigtail for effusion in IR with persistent high output on 1/10/2020. Pt had VATs & placement of Left PleurX catheter. Post op pt developed hemothorax and Respiratory Distress.  Pt s/p Lt chest Evacuation of 2L blood w/ placement of CT x2 on 1/15/2020.  Lt Pleural Effusion resolved and pt doing well after CT removed.  Pt also noted with Rt PNA vs Pleural Effusion that has resolved. Pt most recently s/p Ex Lap, MIRAYM, & Closure of End Ileostomy on 2/4/2020. Pt w/ improving severe Protein-Calorie Malnutrition, Short Gut Syndrome w/Malabsorption.    - cont TPN at goal  - f/u GI for melena. no bloody BM &  H&H remains stable, bx pending from EGD/ Colonoscopy, Protonix suspension  -HypoCa- improving w/ increased Ca in TPN to 14mEq- continue to monitor  -HypoK - improving, will continue w/ 80mEq KCl in TPN  -HypoMg- will increased Mg to 16mEq  -HypoPhos- improving w/-increased NaPhos & will continue to monitor   -Fecal fat testing suggestive of decreased absorption of fat.  -Strict Intake and Output.-continue to monitor. h/o  Edema and Fluid overload.   -Good glycemic control-  Fingersticks & ISS coverage - as per SICU  -Weights three times a week  -Daily CMP, Mg, Ionized Ca, Phosphorus,        and Weekly Triglycerides and Pre-albumin  - Continue as per SICU/Surgery, will follow with you, D/w primary team    TPN team, pager [phone]  D/w Ana M Chacko & MU Siegel 75 year old male w/ PMH of COPD and EtOH dependence with PSH/o appy, prostate surgery, & spine surgery  septic shock and high grade SBO s/p exploratory laparotomy, lysis of adhesions, decompression of bowel via enterotomy w/ primary repair, and Abthera VAC placement on 12/6; s/p take down of Abthera, washout and re-application of the Abthera vac on 12/8. Now s/p SBR and end ileostomy on 12/10.   TPN initally consulted to assist w/ management of pt's nutrition in pt with SGS/malabsorption and had high Ileostomy output.  Pt s/p Lt Chest Pigtail for effusion in IR with persistent high output on 1/10/2020. Pt had VATs & placement of Left PleurX catheter. Post op pt developed hemothorax and Respiratory Distress.  Pt s/p Lt chest Evacuation of 2L blood w/ placement of CT x2 on 1/15/2020.  Lt Pleural Effusion resolved and pt doing well after CT removed.  Pt also noted with Rt PNA vs Pleural Effusion that has resolved. Pt most recently s/p Ex Lap, MIRYAM, & Closure of End Ileostomy on 2/4/2020. Pt w/ improving severe Protein-Calorie Malnutrition, Short Gut Syndrome w/Malabsorption.    - cont TPN at goal  - f/u GI for melena. no bloody BM &  H&H remains stable, bx pending from EGD/ Colonoscopy, Protonix suspension  -HypoCa- improving w/ increased Ca to 16 mEq in TPN- continue to monitor  -HypoK - improving, with continue w/ 80mEq KCl in TPN  -HypoMg- Mg at  16mEq, continue to monitor  -HypoPhos- improving w/-increased NaPhos & will continue to monitor   -Fecal fat testing suggestive of decreased absorption of fat.  -Strict Intake and Output.-continue to monitor. h/o  Edema and Fluid overload.   -Good glycemic control-  Fingersticks & ISS coverage - as per SICU  -Weights three times a week  -Daily CMP, Mg, Ionized Ca, Phosphorus,Weekly Triglycerides 2/15: 44 and Pre-albumin 2/15  pending  - Continue as per SICU/Surgery, will follow with you, D/w primary team    TPN team, pager [phone]  D/w Ana M Chacko & MU Siegel

## 2020-02-16 LAB
ALBUMIN SERPL ELPH-MCNC: 2.7 G/DL — LOW (ref 3.3–5)
ALP SERPL-CCNC: 103 U/L — SIGNIFICANT CHANGE UP (ref 40–120)
ALT FLD-CCNC: 92 U/L — HIGH (ref 10–45)
ANION GAP SERPL CALC-SCNC: 10 MMOL/L — SIGNIFICANT CHANGE UP (ref 5–17)
AST SERPL-CCNC: 44 U/L — HIGH (ref 10–40)
BASOPHILS # BLD AUTO: 0.03 K/UL — SIGNIFICANT CHANGE UP (ref 0–0.2)
BASOPHILS NFR BLD AUTO: 0.2 % — SIGNIFICANT CHANGE UP (ref 0–2)
BILIRUB SERPL-MCNC: 0.4 MG/DL — SIGNIFICANT CHANGE UP (ref 0.2–1.2)
BUN SERPL-MCNC: 18 MG/DL — SIGNIFICANT CHANGE UP (ref 7–23)
CA-I BLD-SCNC: 1.2 MMOL/L — SIGNIFICANT CHANGE UP (ref 1.12–1.3)
CALCIUM SERPL-MCNC: 8.4 MG/DL — SIGNIFICANT CHANGE UP (ref 8.4–10.5)
CHLORIDE SERPL-SCNC: 101 MMOL/L — SIGNIFICANT CHANGE UP (ref 96–108)
CO2 SERPL-SCNC: 24 MMOL/L — SIGNIFICANT CHANGE UP (ref 22–31)
CREAT SERPL-MCNC: 0.42 MG/DL — LOW (ref 0.5–1.3)
EOSINOPHIL # BLD AUTO: 0.31 K/UL — SIGNIFICANT CHANGE UP (ref 0–0.5)
EOSINOPHIL NFR BLD AUTO: 2.1 % — SIGNIFICANT CHANGE UP (ref 0–6)
GLUCOSE SERPL-MCNC: 100 MG/DL — HIGH (ref 70–99)
HCT VFR BLD CALC: 25.9 % — LOW (ref 39–50)
HGB BLD-MCNC: 8.4 G/DL — LOW (ref 13–17)
IMM GRANULOCYTES NFR BLD AUTO: 1 % — SIGNIFICANT CHANGE UP (ref 0–1.5)
LYMPHOCYTES # BLD AUTO: 1.72 K/UL — SIGNIFICANT CHANGE UP (ref 1–3.3)
LYMPHOCYTES # BLD AUTO: 11.8 % — LOW (ref 13–44)
MAGNESIUM SERPL-MCNC: 1.9 MG/DL — SIGNIFICANT CHANGE UP (ref 1.6–2.6)
MCHC RBC-ENTMCNC: 30.2 PG — SIGNIFICANT CHANGE UP (ref 27–34)
MCHC RBC-ENTMCNC: 32.4 GM/DL — SIGNIFICANT CHANGE UP (ref 32–36)
MCV RBC AUTO: 93.2 FL — SIGNIFICANT CHANGE UP (ref 80–100)
MONOCYTES # BLD AUTO: 1.32 K/UL — HIGH (ref 0–0.9)
MONOCYTES NFR BLD AUTO: 9 % — SIGNIFICANT CHANGE UP (ref 2–14)
NEUTROPHILS # BLD AUTO: 11.1 K/UL — HIGH (ref 1.8–7.4)
NEUTROPHILS NFR BLD AUTO: 75.9 % — SIGNIFICANT CHANGE UP (ref 43–77)
NRBC # BLD: 0 /100 WBCS — SIGNIFICANT CHANGE UP (ref 0–0)
PHOSPHATE SERPL-MCNC: 4.3 MG/DL — SIGNIFICANT CHANGE UP (ref 2.5–4.5)
PLATELET # BLD AUTO: 465 K/UL — HIGH (ref 150–400)
POTASSIUM SERPL-MCNC: 4.4 MMOL/L — SIGNIFICANT CHANGE UP (ref 3.5–5.3)
POTASSIUM SERPL-SCNC: 4.4 MMOL/L — SIGNIFICANT CHANGE UP (ref 3.5–5.3)
PROT SERPL-MCNC: 6.7 G/DL — SIGNIFICANT CHANGE UP (ref 6–8.3)
RBC # BLD: 2.78 M/UL — LOW (ref 4.2–5.8)
RBC # FLD: 14.4 % — SIGNIFICANT CHANGE UP (ref 10.3–14.5)
SODIUM SERPL-SCNC: 135 MMOL/L — SIGNIFICANT CHANGE UP (ref 135–145)
WBC # BLD: 14.62 K/UL — HIGH (ref 3.8–10.5)
WBC # FLD AUTO: 14.62 K/UL — HIGH (ref 3.8–10.5)

## 2020-02-16 PROCEDURE — 99233 SBSQ HOSP IP/OBS HIGH 50: CPT

## 2020-02-16 RX ORDER — ELECTROLYTE SOLUTION,INJ
1 VIAL (ML) INTRAVENOUS
Refills: 0 | Status: DISCONTINUED | OUTPATIENT
Start: 2020-02-16 | End: 2020-02-17

## 2020-02-16 RX ORDER — MAGNESIUM SULFATE 500 MG/ML
1 VIAL (ML) INJECTION ONCE
Refills: 0 | Status: COMPLETED | OUTPATIENT
Start: 2020-02-16 | End: 2020-02-16

## 2020-02-16 RX ORDER — DRONABINOL 2.5 MG
2.5 CAPSULE ORAL ONCE
Refills: 0 | Status: DISCONTINUED | OUTPATIENT
Start: 2020-02-16 | End: 2020-02-16

## 2020-02-16 RX ORDER — I.V. FAT EMULSION 20 G/100ML
20.8 EMULSION INTRAVENOUS
Qty: 50 | Refills: 0 | Status: DISCONTINUED | OUTPATIENT
Start: 2020-02-16 | End: 2020-02-17

## 2020-02-16 RX ADMIN — Medication 1 APPLICATION(S): at 18:00

## 2020-02-16 RX ADMIN — Medication 3 MILLILITER(S): at 12:34

## 2020-02-16 RX ADMIN — ERGOCALCIFEROL 50000 UNIT(S): 1.25 CAPSULE ORAL at 12:34

## 2020-02-16 RX ADMIN — I.V. FAT EMULSION 20.8 ML/HR: 20 EMULSION INTRAVENOUS at 18:33

## 2020-02-16 RX ADMIN — CHLORHEXIDINE GLUCONATE 1 APPLICATION(S): 213 SOLUTION TOPICAL at 18:03

## 2020-02-16 RX ADMIN — Medication 3 MILLILITER(S): at 18:00

## 2020-02-16 RX ADMIN — OXYCODONE HYDROCHLORIDE 5 MILLIGRAM(S): 5 TABLET ORAL at 22:25

## 2020-02-16 RX ADMIN — PANTOPRAZOLE SODIUM 40 MILLIGRAM(S): 20 TABLET, DELAYED RELEASE ORAL at 12:35

## 2020-02-16 RX ADMIN — OXYCODONE HYDROCHLORIDE 5 MILLIGRAM(S): 5 TABLET ORAL at 22:56

## 2020-02-16 RX ADMIN — TAMSULOSIN HYDROCHLORIDE 0.4 MILLIGRAM(S): 0.4 CAPSULE ORAL at 21:10

## 2020-02-16 RX ADMIN — Medication 3 MILLILITER(S): at 23:02

## 2020-02-16 RX ADMIN — Medication 2.5 MILLIGRAM(S): at 12:42

## 2020-02-16 RX ADMIN — Medication 600 MILLIGRAM(S): at 18:00

## 2020-02-16 RX ADMIN — BUDESONIDE AND FORMOTEROL FUMARATE DIHYDRATE 2 PUFF(S): 160; 4.5 AEROSOL RESPIRATORY (INHALATION) at 05:39

## 2020-02-16 RX ADMIN — Medication 1 EACH: at 18:33

## 2020-02-16 RX ADMIN — Medication 100 GRAM(S): at 12:42

## 2020-02-16 RX ADMIN — Medication 12.5 MILLIGRAM(S): at 18:00

## 2020-02-16 RX ADMIN — BUDESONIDE AND FORMOTEROL FUMARATE DIHYDRATE 2 PUFF(S): 160; 4.5 AEROSOL RESPIRATORY (INHALATION) at 18:01

## 2020-02-16 RX ADMIN — Medication 975 MILLIGRAM(S): at 22:25

## 2020-02-16 RX ADMIN — ENOXAPARIN SODIUM 40 MILLIGRAM(S): 100 INJECTION SUBCUTANEOUS at 12:35

## 2020-02-16 RX ADMIN — Medication 1 APPLICATION(S): at 05:39

## 2020-02-16 RX ADMIN — Medication 1 APPLICATION(S): at 17:59

## 2020-02-16 RX ADMIN — Medication 975 MILLIGRAM(S): at 22:56

## 2020-02-16 RX ADMIN — Medication 600 MILLIGRAM(S): at 05:38

## 2020-02-16 RX ADMIN — Medication 3 MILLILITER(S): at 05:39

## 2020-02-16 NOTE — PROGRESS NOTE ADULT - SUBJECTIVE AND OBJECTIVE BOX
Surgery Daily Progress Note    SUBJECTIVE:   - Patient seen and examined at bedside  - Patient states feeling well  - Denies abdominal pain, N/V, chest pain, SOB  --------------------------------------------------------------------------------------------------  OBJECTIVE:   Physical Exam:  General: AAOx3, NAD, lying comfortably in bed  HEENT: NC/AT  Respiratory: nonlabored breathing  Cardiovascular: RRR, normal S1 and S2, no murmurs or gallops  Abdomen: non-distended, soft, non-tender, dressings changed, stained with minimal purulent material  Extremities: WWP, no edema  --------------------------------------------------------------------------------------------------  V/S:  Vital Signs Last 24 Hrs  T(C): 37 (16 Feb 2020 09:11), Max: 37.4 (15 Feb 2020 17:03)  T(F): 98.6 (16 Feb 2020 09:11), Max: 99.4 (15 Feb 2020 17:03)  HR: 89 (16 Feb 2020 09:11) (89 - 100)  BP: 131/69 (16 Feb 2020 09:11) (107/66 - 153/80)  BP(mean): --  RR: 18 (16 Feb 2020 09:11) (18 - 18)  SpO2: 97% (16 Feb 2020 09:11) (95% - 97%)    --------------------------------------------------------------------------------------------------  I/Os:    15 Feb 2020 07:01  -  16 Feb 2020 07:00  --------------------------------------------------------  IN:    fat emulsion (Fish Oil and Plant Based) 20% Infusion: 240.4 mL    Oral Fluid: 490 mL    TPN (Total Parenteral Nutrition): 1992 mL  Total IN: 2722.4 mL    OUT:    Voided: 1825 mL  Total OUT: 1825 mL    Total NET: 897.4 mL        --------------------------------------------------------------------------------------------------  LABS:                        8.4    14.62 )-----------( 465      ( 16 Feb 2020 06:46 )             25.9     16 Feb 2020 06:47    135    |  101    |  18     ----------------------------<  100    4.4     |  24     |  0.42     Ca    8.4        16 Feb 2020 06:47  Phos  4.3       16 Feb 2020 06:47  Mg     1.9       16 Feb 2020 06:47    TPro  6.7    /  Alb  2.7    /  TBili  0.4    /  DBili  x      /  AST  44     /  ALT  92     /  AlkPhos  103    16 Feb 2020 06:47      CAPILLARY BLOOD GLUCOSE            LIVER FUNCTIONS - ( 16 Feb 2020 06:47 )  Alb: 2.7 g/dL / Pro: 6.7 g/dL / ALK PHOS: 103 U/L / ALT: 92 U/L / AST: 44 U/L / GGT: x               --------------------------------------------------------------------------------------------------  MEDICATIONS  (STANDING):  albuterol/ipratropium for Nebulization. 3 milliLiter(s) Nebulizer every 6 hours  budesonide 160 MICROgram(s)/formoterol 4.5 MICROgram(s) Inhaler 2 Puff(s) Inhalation two times a day  chlorhexidine 2% Cloths 1 Application(s) Topical <User Schedule>  enoxaparin Injectable 40 milliGRAM(s) SubCutaneous daily  ergocalciferol 61325 Unit(s) Oral every week  fat emulsion (Fish Oil and Plant Based) 20% Infusion 20.8 mL/Hr (20.8 mL/Hr) IV Continuous <Continuous>  guaiFENesin  milliGRAM(s) Oral every 12 hours  metoprolol tartrate 12.5 milliGRAM(s) Oral every 12 hours  nystatin/triamcinolone Cream 1 Application(s) Topical two times a day  pantoprazole   Suspension 40 milliGRAM(s) Oral daily  Parenteral Nutrition - Adult 1 Each (83 mL/Hr) TPN Continuous <Continuous>  Parenteral Nutrition - Adult 1 Each (83 mL/Hr) TPN Continuous <Continuous>  tamsulosin 0.4 milliGRAM(s) Oral at bedtime  triamcinolone 0.1% Ointment 1 Application(s) Topical two times a day    MEDICATIONS  (PRN):  acetaminophen   Tablet .. 975 milliGRAM(s) Oral every 6 hours PRN Mild Pain (1 - 3)  benzocaine 15 mG/menthol 3.6 mG (Sugar-Free) Lozenge 1 Lozenge Oral every 4 hours PRN Sore Throat  oxyCODONE    IR 5 milliGRAM(s) Oral every 4 hours PRN Moderate Pain (4 - 6)

## 2020-02-16 NOTE — PROGRESS NOTE ADULT - SUBJECTIVE AND OBJECTIVE BOX
Subjective: Patient seen and examined. No new events except as noted.     REVIEW OF SYSTEMS:    CONSTITUTIONAL:+ weakness, fevers or chills  EYES/ENT: No visual changes;  No vertigo or throat pain   NECK: No pain or stiffness  RESPIRATORY: No cough, wheezing, hemoptysis; No shortness of breath  CARDIOVASCULAR: No chest pain or palpitations  GASTROINTESTINAL: No abdominal or epigastric pain. No nausea, vomiting, or hematemesis; No diarrhea or constipation. No melena or hematochezia.  GENITOURINARY: No dysuria, frequency or hematuria  NEUROLOGICAL: No numbness or weakness  SKIN: No itching, burning, rashes, or lesions   All other review of systems is negative unless indicated above.    MEDICATIONS:  MEDICATIONS  (STANDING):  albuterol/ipratropium for Nebulization. 3 milliLiter(s) Nebulizer every 6 hours  budesonide 160 MICROgram(s)/formoterol 4.5 MICROgram(s) Inhaler 2 Puff(s) Inhalation two times a day  chlorhexidine 2% Cloths 1 Application(s) Topical <User Schedule>  dronabinol 2.5 milliGRAM(s) Oral once  enoxaparin Injectable 40 milliGRAM(s) SubCutaneous daily  ergocalciferol 71206 Unit(s) Oral every week  fat emulsion (Fish Oil and Plant Based) 20% Infusion 20.8 mL/Hr (20.8 mL/Hr) IV Continuous <Continuous>  guaiFENesin  milliGRAM(s) Oral every 12 hours  metoprolol tartrate 12.5 milliGRAM(s) Oral every 12 hours  nystatin/triamcinolone Cream 1 Application(s) Topical two times a day  pantoprazole   Suspension 40 milliGRAM(s) Oral daily  Parenteral Nutrition - Adult 1 Each (83 mL/Hr) TPN Continuous <Continuous>  tamsulosin 0.4 milliGRAM(s) Oral at bedtime  triamcinolone 0.1% Ointment 1 Application(s) Topical two times a day      PHYSICAL EXAM:  T(C): 37 (02-16-20 @ 09:11), Max: 37.4 (02-15-20 @ 17:03)  HR: 89 (02-16-20 @ 09:11) (89 - 100)  BP: 131/69 (02-16-20 @ 09:11) (107/66 - 153/80)  RR: 18 (02-16-20 @ 09:11) (18 - 18)  SpO2: 97% (02-16-20 @ 09:11) (95% - 97%)  Wt(kg): --  I&O's Summary    15 Feb 2020 07:01  -  16 Feb 2020 07:00  --------------------------------------------------------  IN: 2722.4 mL / OUT: 1825 mL / NET: 897.4 mL          Appearance: Normal	  HEENT:   Normal oral mucosa, PERRL, EOMI	  Lymphatic: No lymphadenopathy , no edema  Cardiovascular: Normal S1 S2, No JVD, No murmurs , Peripheral pulses palpable 2+ bilaterally  Respiratory: Lungs clear to auscultation, normal effort 	  Gastrointestinal:  Soft, Non-tender, + BS	  Skin: No rashes, No ecchymoses, No cyanosis, warm to touch  Musculoskeletal: Normal range of motion, normal strength  Psychiatry:  Mood & affect appropriate  Ext: No edema      LABS:    CARDIAC MARKERS:                                8.4    14.62 )-----------( 465      ( 16 Feb 2020 06:46 )             25.9     02-16    135  |  101  |  18  ----------------------------<  100<H>  4.4   |  24  |  0.42<L>    Ca    8.4      16 Feb 2020 06:47  Phos  4.3     02-16  Mg     1.9     02-16    TPro  6.7  /  Alb  2.7<L>  /  TBili  0.4  /  DBili  x   /  AST  44<H>  /  ALT  92<H>  /  AlkPhos  103  02-16    proBNP:   Lipid Profile:   HgA1c:   TSH:             TELEMETRY: 	    ECG:  	  RADIOLOGY:   DIAGNOSTIC TESTING:  [ ] Echocardiogram:  [ ]  Catheterization:  [ ] Stress Test:    OTHER:

## 2020-02-16 NOTE — PROGRESS NOTE ADULT - ATTENDING COMMENTS
pt seen and examined.  agree with above.  oob to ambulate with PT  monitor po intake, hopefully can wean off TPN  monitor wbc - if increased may need CT  dc planning for rehab possibly later this week.   d/w pt/wife at bedside in detail

## 2020-02-16 NOTE — PROGRESS NOTE ADULT - ASSESSMENT
75 year old male w/ PMH of COPD and EtOH dependence with PSH/o appy, prostate surgery, & spine surgery  septic shock and high grade SBO s/p exploratory laparotomy, lysis of adhesions, decompression of bowel via enterotomy w/ primary repair, and Abthera VAC placement on 12/6; s/p take down of Abthera, washout and re-application of the Abthera vac on 12/8. Now s/p SBR and end ileostomy on 12/10.   TPN initally consulted to assist w/ management of pt's nutrition in pt with SGS/malabsorption and had high Ileostomy output.  Pt s/p Lt Chest Pigtail for effusion in IR with persistent high output on 1/10/2020. Pt had VATs & placement of Left PleurX catheter. Post op pt developed hemothorax and Respiratory Distress.  Pt s/p Lt chest Evacuation of 2L blood w/ placement of CT x2 on 1/15/2020.  Lt Pleural Effusion resolved and pt doing well after CT removed.  Pt also noted with Rt PNA vs Pleural Effusion that has resolved. Pt most recently s/p Ex Lap, MIRYAM, & Closure of End Ileostomy on 2/4/2020. Pt w/ improving severe Protein-Calorie Malnutrition, Short Gut Syndrome w/Malabsorption.    - cont TPN at goal  - f/u GI for melena. no current  bloody BMs, H&H remains stable, bx pending from EGD/ Colonoscopy, Protonix suspension  -HypoCa- improving w/ increased Ca to 16 mEq in TPN- continue to monitor  -HypoK - improving, with continue w/ 80mEq KCl in TPN  -HypoMg- Mg at  16mEq, continue to monitor  -HypoPhos- improving w/-increased NaPhos & will continue to monitor   -Fecal fat testing suggestive of decreased absorption of fat.  -Strict Intake and Output.-continue to monitor. h/o  Edema and Fluid overload.   -Good glycemic control-  Fingersticks & ISS coverage - as per SICU  -Weights three times a week  -Daily CMP, Mg, Ionized Ca, Phosphorus Triglycerides 2/15 was 44 and Pre-albumin 2/15 was 16. Check Triglycerides and Prealbumin on 2/22. Of note prealbumin on 12/13 was 5  -Continue as per SICU/Surgery, will follow with you, D/w primary team    TPN team, pager [phone]  D/w Ana M Chacko & MU Siegel 75 year old male w/ PMH of COPD and EtOH dependence with PSH/o appy, prostate surgery, & spine surgery  septic shock and high grade SBO s/p exploratory laparotomy, lysis of adhesions, decompression of bowel via enterotomy w/ primary repair, and Abthera VAC placement on 12/6; s/p take down of Abthera, washout and re-application of the Abthera vac on 12/8. Now s/p SBR and end ileostomy on 12/10.   TPN initally consulted to assist w/ management of pt's nutrition in pt with SGS/malabsorption and had high Ileostomy output.  Pt s/p Lt Chest Pigtail for effusion in IR with persistent high output on 1/10/2020. Pt had VATs & placement of Left PleurX catheter. Post op pt developed hemothorax and Respiratory Distress.  Pt s/p Lt chest Evacuation of 2L blood w/ placement of CT x2 on 1/15/2020.  Lt Pleural Effusion resolved and pt doing well after CT removed.  Pt also noted with Rt PNA vs Pleural Effusion that has resolved. Pt most recently s/p Ex Lap, MIRYAM, & Closure of End Ileostomy on 2/4/2020. Pt w/ improving severe Protein-Calorie Malnutrition, Short Gut Syndrome w/Malabsorption.    - cont TPN at goal, pt tolerating some of his food trays with little appetite.    - f/u GI for melena. no current  bloody BMs, H&H remains stable, bx pending from EGD/ Colonoscopy, Protonix suspension  -HypoCa- improving w/ increased Ca to 16 mEq in TPN- continue to monitor  -HypoK - improving, with continue w/ 80mEq KCl in TPN  -HypoMg- Mg at  16mEq, continue to monitor  -HypoPhos- improving w/-increased NaPhos & will continue to monitor   -Fecal fat testing suggestive of decreased absorption of fat.  -Strict Intake and Output.-continue to monitor. h/o  Edema and Fluid overload.   -Good glycemic control-  Fingersticks & ISS coverage - as per SICU  -Weights three times a week  -Daily CMP, Mg, Ionized Ca, Phosphorus Triglycerides 2/15 was 44 and Pre-albumin 2/15 was 16. Check Triglycerides and Prealbumin on 2/22. Of note prealbumin on 12/13 was 5  -Continue as per Surgery, who may initiate an appetite stimulant.    TPN team, pager [phone]  D/w Ana M Siegel

## 2020-02-16 NOTE — PROGRESS NOTE ADULT - ASSESSMENT
76 y/o M presenting with septic shock and high grade SBO s/p exploratory laparotomy, lysis of adhesions, decompression of bowel via enterotomy w/ primary repair, and Abthera VAC placement on 12/6; s/p take down of Abthera, washout and re-application of the Abthera vac on 12/8. 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 s/p L chest tube placement by IR 12/30 for pleural effusion now s/p VATS, with chest tube insertion for drainage of pleural effusion. RRT called 1/13 AM for hypoxia, patient transferred back to SICU.  Patient continued SOB, chest drain possible obstruction expanding. Patent taken back to OR emergently 1/15 for clot evac and VATS. Patient is now s/p Ileostomy reversal 2/4/20. Post-op c/b bloody BM and anemia requiring blood transfusion, now improved.      PLAN:  - Start Marinol for appetite stimulation  - Tolerating diet- once adequate po intake, recommend DC TPN and add nutritional supplements (ensure etc)  - BID dressing changes (minimal gauze and tape)  - Monitor BM frequency and appearance  - Pain control as needed  - OOB with PT    [facility]  [id number]

## 2020-02-16 NOTE — PROGRESS NOTE ADULT - SUBJECTIVE AND OBJECTIVE BOX
S: Pt seen and examined. Tolerating TPN at goal, Denies F/C/NS.  Denies CP/SOB/dyspnea/palpitations. Denies N/V/ abd pain. Tolerating LRD.        MEDICATIONS  (STANDING):  albuterol/ipratropium for Nebulization. 3 milliLiter(s) Nebulizer every 6 hours  budesonide 160 MICROgram(s)/formoterol 4.5 MICROgram(s) Inhaler 2 Puff(s) Inhalation two times a day  chlorhexidine 2% Cloths 1 Application(s) Topical <User Schedule>  enoxaparin Injectable 40 milliGRAM(s) SubCutaneous daily  ergocalciferol 60595 Unit(s) Oral every week  fat emulsion (Fish Oil and Plant Based) 20% Infusion 20.8 mL/Hr (20.8 mL/Hr) IV Continuous <Continuous>  guaiFENesin  milliGRAM(s) Oral every 12 hours  metoprolol tartrate 12.5 milliGRAM(s) Oral every 12 hours  nystatin/triamcinolone Cream 1 Application(s) Topical two times a day  pantoprazole   Suspension 40 milliGRAM(s) Oral daily  Parenteral Nutrition - Adult 1 Each (83 mL/Hr) TPN Continuous <Continuous>  tamsulosin 0.4 milliGRAM(s) Oral at bedtime  triamcinolone 0.1% Ointment 1 Application(s) Topical two times a day    MEDICATIONS  (PRN):  acetaminophen   Tablet .. 975 milliGRAM(s) Oral every 6 hours PRN Mild Pain (1 - 3)  benzocaine 15 mG/menthol 3.6 mG (Sugar-Free) Lozenge 1 Lozenge Oral every 4 hours PRN Sore Throat  oxyCODONE    IR 5 milliGRAM(s) Oral every 4 hours PRN Moderate Pain (4 - 6)      LABS:                        8.4    14.62 )-----------( 465      ( 16 Feb 2020 06:46 )             25.9     02-16    135  |  101  |  18  ----------------------------<  100<H>  4.4   |  24  |  0.42<L>    Ca    8.4      16 Feb 2020 06:47  Phos  4.3     02-16  Mg     1.9     02-16    TPro  6.7  /  Alb  2.7<L>  /  TBili  0.4  /  DBili  x   /  AST  44<H>  /  ALT  92<H>  /  AlkPhos  103  02-16            Vital Signs Last 24 Hrs  T(C): 37.2 (16 Feb 2020 05:15), Max: 37.4 (15 Feb 2020 17:03)  T(F): 99 (16 Feb 2020 05:15), Max: 99.4 (15 Feb 2020 17:03)  HR: 99 (16 Feb 2020 05:15) (90 - 100)  BP: 107/66 (16 Feb 2020 05:15) (107/66 - 153/80)  BP(mean): --  RR: 18 (16 Feb 2020 05:15) (18 - 18)  SpO2: 95% (16 Feb 2020 05:15) (95% - 98%)      I&O's Summary    15 Feb 2020 07:01  -  16 Feb 2020 07:00  --------------------------------------------------------  IN: 2722.4 mL / OUT: 1825 mL / NET: 897.4 mL      I&O's Detail    15 Feb 2020 07:01  -  16 Feb 2020 07:00  --------------------------------------------------------  IN:    fat emulsion (Fish Oil and Plant Based) 20% Infusion: 240.4 mL    Oral Fluid: 490 mL    TPN (Total Parenteral Nutrition): 1992 mL  Total IN: 2722.4 mL    OUT:    Voided: 1825 mL  Total OUT: 1825 mL    Physical Exam:    	Gen: WD NAD, A&Ox3  	HEENT: NC/AT, PERRL              Neck, trachea midline,               Chest: non labored breathing. equal chest expansion b/l.              Abd: softly distended, nontender, midline & Rt sided old ostomy site dressing c/d/i              MSK/Vascular: FAROM x4, Equally Warm,  no edema, no clubbing, cyanosis,  LLE w/ brace                      RUE PICC w/o sx infection  	Neuro: No focal deficits, grossly intact sensation, & strength   	Psych: Normal affect and mood              Skin: good turgor      .  .  .  .  . S: Pt seen and examined. Tolerating TPN at goal, Denies F/C/NS.  Denies CP/SOB/dyspnea/palpitations. Denies N/V. Tolerating 1/3 of his trays. He had a grilled sandwich for lunch yesterday, but skipped dinner, and not feeling hungry this AM so eating just a part of his [place] toast. Reports "liquidy" stools and flatus, and an episode of "gas pain" which lasted 20min.  Pt requested simthicone, but didn't receive until pain had already passed.        MEDICATIONS  (STANDING):  albuterol/ipratropium for Nebulization. 3 milliLiter(s) Nebulizer every 6 hours  budesonide 160 MICROgram(s)/formoterol 4.5 MICROgram(s) Inhaler 2 Puff(s) Inhalation two times a day  chlorhexidine 2% Cloths 1 Application(s) Topical <User Schedule>  enoxaparin Injectable 40 milliGRAM(s) SubCutaneous daily  ergocalciferol 16489 Unit(s) Oral every week  fat emulsion (Fish Oil and Plant Based) 20% Infusion 20.8 mL/Hr (20.8 mL/Hr) IV Continuous <Continuous>  guaiFENesin  milliGRAM(s) Oral every 12 hours  metoprolol tartrate 12.5 milliGRAM(s) Oral every 12 hours  nystatin/triamcinolone Cream 1 Application(s) Topical two times a day  pantoprazole   Suspension 40 milliGRAM(s) Oral daily  Parenteral Nutrition - Adult 1 Each (83 mL/Hr) TPN Continuous <Continuous>  tamsulosin 0.4 milliGRAM(s) Oral at bedtime  triamcinolone 0.1% Ointment 1 Application(s) Topical two times a day    MEDICATIONS  (PRN):  acetaminophen   Tablet .. 975 milliGRAM(s) Oral every 6 hours PRN Mild Pain (1 - 3)  benzocaine 15 mG/menthol 3.6 mG (Sugar-Free) Lozenge 1 Lozenge Oral every 4 hours PRN Sore Throat  oxyCODONE    IR 5 milliGRAM(s) Oral every 4 hours PRN Moderate Pain (4 - 6)      LABS:                        8.4    14.62 )-----------( 465      ( 16 Feb 2020 06:46 )             25.9     02-16    135  |  101  |  18  ----------------------------<  100<H>  4.4   |  24  |  0.42<L>    Ca    8.4      16 Feb 2020 06:47  Phos  4.3     02-16  Mg     1.9     02-16    TPro  6.7  /  Alb  2.7<L>  /  TBili  0.4  /  DBili  x   /  AST  44<H>  /  ALT  92<H>  /  AlkPhos  103  02-16            Vital Signs Last 24 Hrs  T(C): 37.2 (16 Feb 2020 05:15), Max: 37.4 (15 Feb 2020 17:03)  T(F): 99 (16 Feb 2020 05:15), Max: 99.4 (15 Feb 2020 17:03)  HR: 99 (16 Feb 2020 05:15) (90 - 100)  BP: 107/66 (16 Feb 2020 05:15) (107/66 - 153/80)  BP(mean): --  RR: 18 (16 Feb 2020 05:15) (18 - 18)  SpO2: 95% (16 Feb 2020 05:15) (95% - 98%)      I&O's Summary    15 Feb 2020 07:01  -  16 Feb 2020 07:00  --------------------------------------------------------  IN: 2722.4 mL / OUT: 1825 mL / NET: 897.4 mL      I&O's Detail    15 Feb 2020 07:01  -  16 Feb 2020 07:00  --------------------------------------------------------  IN:    fat emulsion (Fish Oil and Plant Based) 20% Infusion: 240.4 mL    Oral Fluid: 490 mL    TPN (Total Parenteral Nutrition): 1992 mL  Total IN: 2722.4 mL    OUT:    Voided: 1825 mL  Total OUT: 1825 mL    Physical Exam:    	Gen: WD NAD, A&Ox3  	HEENT: NC/AT, PERRL              Neck, trachea midline,               Chest: non labored breathing. equal chest expansion b/l.              Abd: softly distended, nontender, midline & Rt sided old ostomy site dressing c/d/i              MSK/Vascular: FAROM x4, Equally Warm,  no edema, no clubbing, cyanosis,  LLE w/ brace                      RUE PICC w/o sx infection  	Neuro: No focal deficits, grossly intact sensation, & strength   	Psych: Normal affect and mood              Skin: good turgor      .  .  .  .  .

## 2020-02-17 LAB
ALBUMIN SERPL ELPH-MCNC: 2.6 G/DL — LOW (ref 3.3–5)
ALP SERPL-CCNC: 100 U/L — SIGNIFICANT CHANGE UP (ref 40–120)
ALT FLD-CCNC: 114 U/L — HIGH (ref 10–45)
ANION GAP SERPL CALC-SCNC: 8 MMOL/L — SIGNIFICANT CHANGE UP (ref 5–17)
AST SERPL-CCNC: 60 U/L — HIGH (ref 10–40)
BASOPHILS # BLD AUTO: 0.03 K/UL — SIGNIFICANT CHANGE UP (ref 0–0.2)
BASOPHILS NFR BLD AUTO: 0.2 % — SIGNIFICANT CHANGE UP (ref 0–2)
BILIRUB SERPL-MCNC: 0.3 MG/DL — SIGNIFICANT CHANGE UP (ref 0.2–1.2)
BUN SERPL-MCNC: 19 MG/DL — SIGNIFICANT CHANGE UP (ref 7–23)
CA-I BLD-SCNC: 1.2 MMOL/L — SIGNIFICANT CHANGE UP (ref 1.12–1.3)
CALCIUM SERPL-MCNC: 8.3 MG/DL — LOW (ref 8.4–10.5)
CHLORIDE SERPL-SCNC: 102 MMOL/L — SIGNIFICANT CHANGE UP (ref 96–108)
CO2 SERPL-SCNC: 24 MMOL/L — SIGNIFICANT CHANGE UP (ref 22–31)
CREAT SERPL-MCNC: 0.41 MG/DL — LOW (ref 0.5–1.3)
EOSINOPHIL # BLD AUTO: 0.35 K/UL — SIGNIFICANT CHANGE UP (ref 0–0.5)
EOSINOPHIL NFR BLD AUTO: 2.5 % — SIGNIFICANT CHANGE UP (ref 0–6)
GLUCOSE SERPL-MCNC: 95 MG/DL — SIGNIFICANT CHANGE UP (ref 70–99)
HCT VFR BLD CALC: 24.2 % — LOW (ref 39–50)
HGB BLD-MCNC: 7.8 G/DL — LOW (ref 13–17)
IMM GRANULOCYTES NFR BLD AUTO: 1.2 % — SIGNIFICANT CHANGE UP (ref 0–1.5)
LYMPHOCYTES # BLD AUTO: 1.66 K/UL — SIGNIFICANT CHANGE UP (ref 1–3.3)
LYMPHOCYTES # BLD AUTO: 11.7 % — LOW (ref 13–44)
MAGNESIUM SERPL-MCNC: 1.9 MG/DL — SIGNIFICANT CHANGE UP (ref 1.6–2.6)
MCHC RBC-ENTMCNC: 30.1 PG — SIGNIFICANT CHANGE UP (ref 27–34)
MCHC RBC-ENTMCNC: 32.2 GM/DL — SIGNIFICANT CHANGE UP (ref 32–36)
MCV RBC AUTO: 93.4 FL — SIGNIFICANT CHANGE UP (ref 80–100)
MONOCYTES # BLD AUTO: 1.24 K/UL — HIGH (ref 0–0.9)
MONOCYTES NFR BLD AUTO: 8.7 % — SIGNIFICANT CHANGE UP (ref 2–14)
NEUTROPHILS # BLD AUTO: 10.77 K/UL — HIGH (ref 1.8–7.4)
NEUTROPHILS NFR BLD AUTO: 75.7 % — SIGNIFICANT CHANGE UP (ref 43–77)
NRBC # BLD: 0 /100 WBCS — SIGNIFICANT CHANGE UP (ref 0–0)
PHOSPHATE SERPL-MCNC: 4.4 MG/DL — SIGNIFICANT CHANGE UP (ref 2.5–4.5)
PLATELET # BLD AUTO: 477 K/UL — HIGH (ref 150–400)
POTASSIUM SERPL-MCNC: 4.5 MMOL/L — SIGNIFICANT CHANGE UP (ref 3.5–5.3)
POTASSIUM SERPL-SCNC: 4.5 MMOL/L — SIGNIFICANT CHANGE UP (ref 3.5–5.3)
PROT SERPL-MCNC: 6.5 G/DL — SIGNIFICANT CHANGE UP (ref 6–8.3)
RBC # BLD: 2.59 M/UL — LOW (ref 4.2–5.8)
RBC # FLD: 14.5 % — SIGNIFICANT CHANGE UP (ref 10.3–14.5)
SODIUM SERPL-SCNC: 134 MMOL/L — LOW (ref 135–145)
WBC # BLD: 14.22 K/UL — HIGH (ref 3.8–10.5)
WBC # FLD AUTO: 14.22 K/UL — HIGH (ref 3.8–10.5)

## 2020-02-17 RX ORDER — I.V. FAT EMULSION 20 G/100ML
20.8 EMULSION INTRAVENOUS
Qty: 50 | Refills: 0 | Status: DISCONTINUED | OUTPATIENT
Start: 2020-02-17 | End: 2020-02-18

## 2020-02-17 RX ORDER — DRONABINOL 2.5 MG
5 CAPSULE ORAL DAILY
Refills: 0 | Status: DISCONTINUED | OUTPATIENT
Start: 2020-02-17 | End: 2020-02-24

## 2020-02-17 RX ORDER — ELECTROLYTE SOLUTION,INJ
1 VIAL (ML) INTRAVENOUS
Refills: 0 | Status: DISCONTINUED | OUTPATIENT
Start: 2020-02-17 | End: 2020-02-17

## 2020-02-17 RX ORDER — MAGNESIUM SULFATE 500 MG/ML
2 VIAL (ML) INJECTION ONCE
Refills: 0 | Status: COMPLETED | OUTPATIENT
Start: 2020-02-17 | End: 2020-02-17

## 2020-02-17 RX ADMIN — Medication 12.5 MILLIGRAM(S): at 05:49

## 2020-02-17 RX ADMIN — Medication 3 MILLILITER(S): at 05:50

## 2020-02-17 RX ADMIN — Medication 1 EACH: at 17:43

## 2020-02-17 RX ADMIN — OXYCODONE HYDROCHLORIDE 5 MILLIGRAM(S): 5 TABLET ORAL at 22:36

## 2020-02-17 RX ADMIN — Medication 3 MILLILITER(S): at 12:08

## 2020-02-17 RX ADMIN — TAMSULOSIN HYDROCHLORIDE 0.4 MILLIGRAM(S): 0.4 CAPSULE ORAL at 21:16

## 2020-02-17 RX ADMIN — Medication 1 EACH: at 21:18

## 2020-02-17 RX ADMIN — I.V. FAT EMULSION 20.8 ML/HR: 20 EMULSION INTRAVENOUS at 21:18

## 2020-02-17 RX ADMIN — Medication 1 APPLICATION(S): at 05:49

## 2020-02-17 RX ADMIN — Medication 5 MILLIGRAM(S): at 12:07

## 2020-02-17 RX ADMIN — Medication 975 MILLIGRAM(S): at 22:37

## 2020-02-17 RX ADMIN — CHLORHEXIDINE GLUCONATE 1 APPLICATION(S): 213 SOLUTION TOPICAL at 11:48

## 2020-02-17 RX ADMIN — Medication 1 APPLICATION(S): at 17:48

## 2020-02-17 RX ADMIN — Medication 975 MILLIGRAM(S): at 23:07

## 2020-02-17 RX ADMIN — I.V. FAT EMULSION 20.8 ML/HR: 20 EMULSION INTRAVENOUS at 05:49

## 2020-02-17 RX ADMIN — Medication 50 GRAM(S): at 08:02

## 2020-02-17 RX ADMIN — Medication 600 MILLIGRAM(S): at 19:17

## 2020-02-17 RX ADMIN — PANTOPRAZOLE SODIUM 40 MILLIGRAM(S): 20 TABLET, DELAYED RELEASE ORAL at 12:08

## 2020-02-17 RX ADMIN — ENOXAPARIN SODIUM 40 MILLIGRAM(S): 100 INJECTION SUBCUTANEOUS at 12:06

## 2020-02-17 RX ADMIN — OXYCODONE HYDROCHLORIDE 5 MILLIGRAM(S): 5 TABLET ORAL at 23:06

## 2020-02-17 RX ADMIN — BUDESONIDE AND FORMOTEROL FUMARATE DIHYDRATE 2 PUFF(S): 160; 4.5 AEROSOL RESPIRATORY (INHALATION) at 17:45

## 2020-02-17 RX ADMIN — BUDESONIDE AND FORMOTEROL FUMARATE DIHYDRATE 2 PUFF(S): 160; 4.5 AEROSOL RESPIRATORY (INHALATION) at 05:49

## 2020-02-17 RX ADMIN — Medication 1 EACH: at 05:48

## 2020-02-17 RX ADMIN — Medication 1 APPLICATION(S): at 17:49

## 2020-02-17 RX ADMIN — I.V. FAT EMULSION 20.8 ML/HR: 20 EMULSION INTRAVENOUS at 17:42

## 2020-02-17 RX ADMIN — Medication 12.5 MILLIGRAM(S): at 17:48

## 2020-02-17 RX ADMIN — Medication 600 MILLIGRAM(S): at 05:49

## 2020-02-17 RX ADMIN — Medication 3 MILLILITER(S): at 23:00

## 2020-02-17 RX ADMIN — Medication 3 MILLILITER(S): at 17:45

## 2020-02-17 NOTE — PROGRESS NOTE ADULT - ASSESSMENT
74 y/o M presenting with septic shock and high grade SBO s/p exploratory laparotomy, lysis of adhesions, decompression of bowel via enterotomy w/ primary repair, and Abthera VAC placement on 12/6; s/p take down of Abthera, washout and re-application of the Abthera vac on 12/8. 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 s/p L chest tube placement by IR 12/30 for pleural effusion now s/p VATS, with chest tube insertion for drainage of pleural effusion. RRT called 1/13 AM for hypoxia, patient transferred back to SICU.  Patient continued SOB, chest drain possible obstruction expanding. Patent taken back to OR emergently 1/15 for clot evac and VATS. Patient is now s/p Ileostomy reversal 2/4/20. Post-op c/b bloody BM and anemia requiring blood transfusion, now improved. Now tolerating regular diet.      PLAN:  - C/w Marinol for appetite stimulation  - Tolerating diet- once adequate po intake, recommend DC TPN and add nutritional supplements (ensure etc)  - BID dressing changes (minimal gauze and tape)  - Monitor BM frequency and appearance  - Pain control as needed  - OOB with PT    [facility]  [id number]

## 2020-02-17 NOTE — PROGRESS NOTE ADULT - ATTENDING COMMENTS
Patient seen and examined  Denies nausea or vomiting  Tolerating PO intake  Had some 3 bowel movements two days ago, and 2 bowel movements yesterday    Abd is soft, not tender and not distended  Scant erythema    - Diet as tolerated  - Calorie count

## 2020-02-17 NOTE — PROGRESS NOTE ADULT - SUBJECTIVE AND OBJECTIVE BOX
Interval events: Marinol added for appetite stimulant. No acute events overnight.    S: Patient doing well, tolerating regular diet. Reports he is eating most of his meals and has good appetite but feels like the amount of food he is being given is just too much. Passing flatus and BM, nonbloody. Pain well controlled. Getting OOB to chair and working with PT. No acute complaints.    O: Vital Signs  T(C): 36.9 (02-17 @ 05:40), Max: 37.4 (02-16 @ 16:47)  HR: 96 (02-17 @ 05:40) (96 - 103)  BP: 113/69 (02-17 @ 05:40) (113/69 - 130/70)  RR: 18 (02-17 @ 05:40) (18 - 18)  SpO2: 93% (02-17 @ 05:40) (93% - 98%)  02-16-20 @ 07:01  -  02-17-20 @ 07:00  --------------------------------------------------------  IN: 3572.4 mL / OUT: 1550 mL / NET: 2022.4 mL    02-17-20 @ 07:01  -  02-17-20 @ 09:20  --------------------------------------------------------  IN: 50 mL / OUT: 0 mL / NET: 50 mL      General: alert and oriented, NAD  Resp: airway patent, respirations unlabored  CVS: regular rate and rhythm  Abdomen: soft, nontender, nondistended, incision c/d/i, packing changed on AM rounds  Extremities: no edema  Skin: warm, dry, appropriate color                          7.8    14.22 )-----------( 477      ( 17 Feb 2020 06:22 )             24.2   02-17    134<L>  |  102  |  19  ----------------------------<  95  4.5   |  24  |  0.41<L>    Ca    8.3<L>      17 Feb 2020 06:22  Phos  4.4     02-17  Mg     1.9     02-17    TPro  6.5  /  Alb  2.6<L>  /  TBili  0.3  /  DBili  x   /  AST  60<H>  /  ALT  114<H>  /  AlkPhos  100  02-17

## 2020-02-17 NOTE — PROGRESS NOTE ADULT - SUBJECTIVE AND OBJECTIVE BOX
[facility] NUTRITION SUPPORT / TPN -- FOLLOW UP NOTE  --------------------------------------------------------------------------------    24 hour events/subjective:  No acute overnight events.  Marinol dose increased to 5mg daily today.  Reports appetite is starting to improve.  Denies N/V.  +flatus, +BM.  Reports he is able to tolerate small amounts of food each meal, is afraid of N/V by overeating.  Remains on full strength TPN.    Diet:  Diet, Low Fiber:   Supplement Feeding Modality:  Oral  Ensure Clear Cans or Servings Per Day:  2       Frequency:  Three Times a day  Ensure Enlive Cans or Servings Per Day:  2       Frequency:  Three Times a day (02-14-20 @ 10:07)      PAST HISTORY  --------------------------------------------------------------------------------  No significant changes to PMH, PSH, FHx, SHx, unless otherwise noted    ALLERGIES & MEDICATIONS  --------------------------------------------------------------------------------  Allergies    IV Contrast (Hives)    Intolerances      Standing Inpatient Medications  albuterol/ipratropium for Nebulization. 3 milliLiter(s) Nebulizer every 6 hours  budesonide 160 MICROgram(s)/formoterol 4.5 MICROgram(s) Inhaler 2 Puff(s) Inhalation two times a day  chlorhexidine 2% Cloths 1 Application(s) Topical <User Schedule>  dronabinol 5 milliGRAM(s) Oral daily  enoxaparin Injectable 40 milliGRAM(s) SubCutaneous daily  ergocalciferol 74479 Unit(s) Oral every week  fat emulsion (Fish Oil and Plant Based) 20% Infusion 20.8 mL/Hr IV Continuous <Continuous>  guaiFENesin  milliGRAM(s) Oral every 12 hours  metoprolol tartrate 12.5 milliGRAM(s) Oral every 12 hours  nystatin/triamcinolone Cream 1 Application(s) Topical two times a day  pantoprazole   Suspension 40 milliGRAM(s) Oral daily  Parenteral Nutrition - Adult 1 Each TPN Continuous <Continuous>  tamsulosin 0.4 milliGRAM(s) Oral at bedtime  triamcinolone 0.1% Ointment 1 Application(s) Topical two times a day    PRN Inpatient Medications  acetaminophen   Tablet .. 975 milliGRAM(s) Oral every 6 hours PRN  benzocaine 15 mG/menthol 3.6 mG (Sugar-Free) Lozenge 1 Lozenge Oral every 4 hours PRN  oxyCODONE    IR 5 milliGRAM(s) Oral every 4 hours PRN      VITALS/PHYSICAL EXAM  --------------------------------------------------------------------------------  T(C): 36.9 (02-17-20 @ 05:40), Max: 37.4 (02-16-20 @ 16:47)  HR: 96 (02-17-20 @ 05:40) (96 - 103)  BP: 113/69 (02-17-20 @ 05:40) (113/69 - 130/70)  RR: 18 (02-17-20 @ 05:40) (18 - 18)  SpO2: 93% (02-17-20 @ 05:40) (93% - 98%)  Wt(kg): --        02-16-20 @ 07:01  -  02-17-20 @ 07:00  --------------------------------------------------------  IN: 3572.4 mL / OUT: 1550 mL / NET: 2022.4 mL    02-17-20 @ 07:01  -  02-17-20 @ 09:24  --------------------------------------------------------  IN: 50 mL / OUT: 0 mL / NET: 50 mL         Physical Exam:    	Gen: NAD, A&Ox3  	HEENT: NC/AT, PERRL              Neck, trachea midline              Chest: non labored breathing. equal chest expansion b/l.              Abd: softly distended, nontender, incisions with dressings C/D/I              MSK/Vascular: FAROM x4, Equally Warm,  no edema, no clubbing, cyanosis  	Neuro: No focal deficits, grossly intact sensation, & strength   	Psych: Normal affect and mood              Skin: good turgor              RUE PICC dressing C/D/I          LABS/STUDIES  --------------------------------------------------------------------------------              7.8    14.22 >-----------<  477      [02-17-20 @ 06:22]              24.2     134  |  102  |  19  ----------------------------<  95      [02-17-20 @ 06:22]  4.5   |  24  |  0.41        Ca     8.3     [02-17-20 @ 06:22]      iCa    1.20     [02-17 @ 06:28]      Mg     1.9     [02-17-20 @ 06:22]      Phos  4.4     [02-17-20 @ 06:22]    TPro  6.5  /  Alb  2.6  /  TBili  0.3  /  DBili  x   /  AST  60  /  ALT  114  /  AlkPhos  100  [02-17-20 @ 06:22]          Ca ionized  Creatinine Trend:  POC glucoseGlucose, Serum: 95 mg/dL (02-17-20 @ 06:22)  CAPILLARY BLOOD GLUCOSE        PrealbuminPrealbumin, Serum: 16 mg/dL (02-15-20 @ 12:30)  Prealbumin, Serum: 17 mg/dL (02-10-20 @ 09:15)  Prealbumin, Serum: 21 mg/dL (02-03-20 @ 23:50)  Prealbumin, Serum: 19 mg/dL (02-03-20 @ 07:18)  Prealbumin, Serum: 18 mg/dL (02-02-20 @ 09:48)    Triglycerides

## 2020-02-17 NOTE — PROGRESS NOTE ADULT - SUBJECTIVE AND OBJECTIVE BOX
Subjective: Patient seen and examined. No new events except as noted.   resting comfortably      REVIEW OF SYSTEMS:    CONSTITUTIONAL:+ weakness, fevers or chills  EYES/ENT: No visual changes;  No vertigo or throat pain   NECK: No pain or stiffness  RESPIRATORY: No cough, wheezing, hemoptysis; No shortness of breath  CARDIOVASCULAR: No chest pain or palpitations  GASTROINTESTINAL: No abdominal or epigastric pain. No nausea, vomiting, or hematemesis; No diarrhea or constipation. No melena or hematochezia.  GENITOURINARY: No dysuria, frequency or hematuria  NEUROLOGICAL: No numbness or weakness  SKIN: No itching, burning, rashes, or lesions   All other review of systems is negative unless indicated above.    MEDICATIONS:  MEDICATIONS  (STANDING):  albuterol/ipratropium for Nebulization. 3 milliLiter(s) Nebulizer every 6 hours  budesonide 160 MICROgram(s)/formoterol 4.5 MICROgram(s) Inhaler 2 Puff(s) Inhalation two times a day  chlorhexidine 2% Cloths 1 Application(s) Topical <User Schedule>  dronabinol 5 milliGRAM(s) Oral daily  enoxaparin Injectable 40 milliGRAM(s) SubCutaneous daily  ergocalciferol 95761 Unit(s) Oral every week  fat emulsion (Fish Oil and Plant Based) 20% Infusion 20.8 mL/Hr (20.8 mL/Hr) IV Continuous <Continuous>  guaiFENesin  milliGRAM(s) Oral every 12 hours  metoprolol tartrate 12.5 milliGRAM(s) Oral every 12 hours  nystatin/triamcinolone Cream 1 Application(s) Topical two times a day  pantoprazole   Suspension 40 milliGRAM(s) Oral daily  Parenteral Nutrition - Adult 1 Each (83 mL/Hr) TPN Continuous <Continuous>  tamsulosin 0.4 milliGRAM(s) Oral at bedtime  triamcinolone 0.1% Ointment 1 Application(s) Topical two times a day      PHYSICAL EXAM:  T(C): 36.8 (02-17-20 @ 09:35), Max: 37.4 (02-16-20 @ 16:47)  HR: 92 (02-17-20 @ 09:35) (92 - 103)  BP: 122/67 (02-17-20 @ 09:35) (113/69 - 130/70)  RR: 18 (02-17-20 @ 09:35) (18 - 18)  SpO2: 95% (02-17-20 @ 09:35) (93% - 98%)  Wt(kg): --  I&O's Summary    16 Feb 2020 07:01  -  17 Feb 2020 07:00  --------------------------------------------------------  IN: 3572.4 mL / OUT: 1550 mL / NET: 2022.4 mL    17 Feb 2020 07:01  -  17 Feb 2020 10:08  --------------------------------------------------------  IN: 250 mL / OUT: 250 mL / NET: 0 mL          Appearance: NAD  HEENT:  Dry oral mucosa, PERRL, EOMI	  Lymphatic: No lymphadenopathy , no edema  Cardiovascular: Normal S1 S2, No JVD, No murmurs , Peripheral pulses palpable 2+ bilaterally  Respiratory: Lungs clear to auscultation, normal effort 	  Gastrointestinal:  Soft, Non-tender, + BS	  Skin: No rashes, No ecchymoses, No cyanosis, warm to touch  Musculoskeletal: Normal range of motion, normal strength  Psychiatry:  Mood & affect appropriate  Ext: No edema      LABS:    CARDIAC MARKERS:                                7.8    14.22 )-----------( 477      ( 17 Feb 2020 06:22 )             24.2     02-17    134<L>  |  102  |  19  ----------------------------<  95  4.5   |  24  |  0.41<L>    Ca    8.3<L>      17 Feb 2020 06:22  Phos  4.4     02-17  Mg     1.9     02-17    TPro  6.5  /  Alb  2.6<L>  /  TBili  0.3  /  DBili  x   /  AST  60<H>  /  ALT  114<H>  /  AlkPhos  100  02-17    proBNP:   Lipid Profile:   HgA1c:   TSH:             TELEMETRY: 	    ECG:  	  RADIOLOGY:   DIAGNOSTIC TESTING:  [ ] Echocardiogram:  [ ]  Catheterization:  [ ] Stress Test:    OTHER:

## 2020-02-17 NOTE — PROGRESS NOTE ADULT - ASSESSMENT
74 y/o M presenting with septic shock and high grade SBO s/p exploratory laparotomy, lysis of adhesions, decompression of bowel via enterotomy w/ primary repair, and Abthera VAC placement on 12/6; s/p take down of Abthera, washout and re-application of the Abthera vac on 12/8. 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 s/p L chest tube placement by IR 12/30 for pleural effusion now s/p VATS, with chest tube insertion for drainage of pleural effusion. RRT called 1/13 AM for hypoxia, patient transferred back to SICU.  Patient continued SOB, chest drain possible obstruction expanding. Patent taken back to OR emergently 1/15 for clot evac and VATS. Patient is now s/p Ileostomy reversal 2/4/20. Post-op c/b bloody BM and anemia requiring blood transfusion, now improved. Now tolerating regular diet with TPN.  -cont regular diet with supplements between meals  -consider calorie counts to objectively assess nutritional intake  - cont TPN until able to maintain nutritional status via solely PO intake      TPN pager [phone], spectra [phone]  discussed with Dr. Chacko and Dr. MELISSA Siegel 76 y/o M presenting with septic shock and high grade SBO s/p exploratory laparotomy, lysis of adhesions, decompression of bowel via enterotomy w/ primary repair, and Abthera VAC placement on 12/6; s/p take down of Abthera, washout and re-application of the Abthera vac on 12/8. 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 s/p L chest tube placement by IR 12/30 for pleural effusion now s/p VATS, with chest tube insertion for drainage of pleural effusion. RRT called 1/13 AM for hypoxia, patient transferred back to SICU.  Patient continued SOB, chest drain possible obstruction expanding. Patent taken back to OR emergently 1/15 for clot evac and VATS. Patient is now s/p Ileostomy reversal 2/4/20. Post-op c/b bloody BM and anemia requiring blood transfusion, now improved. Now tolerating regular diet with TPN.  -cont regular diet with supplements between meals  -consider calorie counts to objectively assess nutritional intake  - cont TPN until able to maintain nutritional status via PO intake  -cont goal TPN:  210g dectrose, 120g AA, 50g lipids in 2000 cc total volume  - LFT's uptrending. would check CPK and LDH  - leukocytosis - continue to monitor CBC  -care per primary team      TPN pager [phone], spectra [phone]  discussed with Dr. Chacko and Dr. MELISSA Siegel

## 2020-02-18 LAB
ALBUMIN SERPL ELPH-MCNC: 2.7 G/DL — LOW (ref 3.3–5)
ALP SERPL-CCNC: 105 U/L — SIGNIFICANT CHANGE UP (ref 40–120)
ALT FLD-CCNC: 163 U/L — HIGH (ref 10–45)
ANION GAP SERPL CALC-SCNC: 12 MMOL/L — SIGNIFICANT CHANGE UP (ref 5–17)
AST SERPL-CCNC: 85 U/L — HIGH (ref 10–40)
BILIRUB SERPL-MCNC: 0.4 MG/DL — SIGNIFICANT CHANGE UP (ref 0.2–1.2)
BUN SERPL-MCNC: 20 MG/DL — SIGNIFICANT CHANGE UP (ref 7–23)
CALCIUM SERPL-MCNC: 8.4 MG/DL — SIGNIFICANT CHANGE UP (ref 8.4–10.5)
CHLORIDE SERPL-SCNC: 100 MMOL/L — SIGNIFICANT CHANGE UP (ref 96–108)
CK SERPL-CCNC: 38 U/L — SIGNIFICANT CHANGE UP (ref 30–200)
CO2 SERPL-SCNC: 23 MMOL/L — SIGNIFICANT CHANGE UP (ref 22–31)
CREAT SERPL-MCNC: 0.43 MG/DL — LOW (ref 0.5–1.3)
GLUCOSE SERPL-MCNC: 97 MG/DL — SIGNIFICANT CHANGE UP (ref 70–99)
HCT VFR BLD CALC: 23.7 % — LOW (ref 39–50)
HGB BLD-MCNC: 7.7 G/DL — LOW (ref 13–17)
LDH SERPL L TO P-CCNC: 203 U/L — SIGNIFICANT CHANGE UP (ref 50–242)
MAGNESIUM SERPL-MCNC: 1.9 MG/DL — SIGNIFICANT CHANGE UP (ref 1.6–2.6)
MCHC RBC-ENTMCNC: 30.1 PG — SIGNIFICANT CHANGE UP (ref 27–34)
MCHC RBC-ENTMCNC: 32.5 GM/DL — SIGNIFICANT CHANGE UP (ref 32–36)
MCV RBC AUTO: 92.6 FL — SIGNIFICANT CHANGE UP (ref 80–100)
NRBC # BLD: 0 /100 WBCS — SIGNIFICANT CHANGE UP (ref 0–0)
PHOSPHATE SERPL-MCNC: 4.4 MG/DL — SIGNIFICANT CHANGE UP (ref 2.5–4.5)
PLATELET # BLD AUTO: 449 K/UL — HIGH (ref 150–400)
POTASSIUM SERPL-MCNC: 4.5 MMOL/L — SIGNIFICANT CHANGE UP (ref 3.5–5.3)
POTASSIUM SERPL-SCNC: 4.5 MMOL/L — SIGNIFICANT CHANGE UP (ref 3.5–5.3)
PROT SERPL-MCNC: 6.5 G/DL — SIGNIFICANT CHANGE UP (ref 6–8.3)
RBC # BLD: 2.56 M/UL — LOW (ref 4.2–5.8)
RBC # FLD: 14.4 % — SIGNIFICANT CHANGE UP (ref 10.3–14.5)
SODIUM SERPL-SCNC: 135 MMOL/L — SIGNIFICANT CHANGE UP (ref 135–145)
WBC # BLD: 14.13 K/UL — HIGH (ref 3.8–10.5)
WBC # FLD AUTO: 14.13 K/UL — HIGH (ref 3.8–10.5)

## 2020-02-18 PROCEDURE — 99233 SBSQ HOSP IP/OBS HIGH 50: CPT

## 2020-02-18 RX ORDER — I.V. FAT EMULSION 20 G/100ML
10.4 EMULSION INTRAVENOUS
Qty: 25 | Refills: 0 | Status: DISCONTINUED | OUTPATIENT
Start: 2020-02-18 | End: 2020-02-19

## 2020-02-18 RX ORDER — ELECTROLYTE SOLUTION,INJ
1 VIAL (ML) INTRAVENOUS
Refills: 0 | Status: DISCONTINUED | OUTPATIENT
Start: 2020-02-18 | End: 2020-02-19

## 2020-02-18 RX ADMIN — Medication 3 MILLILITER(S): at 23:11

## 2020-02-18 RX ADMIN — I.V. FAT EMULSION 10.4 ML/HR: 20 EMULSION INTRAVENOUS at 18:01

## 2020-02-18 RX ADMIN — Medication 3 MILLILITER(S): at 17:59

## 2020-02-18 RX ADMIN — Medication 1 APPLICATION(S): at 18:00

## 2020-02-18 RX ADMIN — Medication 1 EACH: at 18:01

## 2020-02-18 RX ADMIN — Medication 12.5 MILLIGRAM(S): at 17:59

## 2020-02-18 RX ADMIN — Medication 1 APPLICATION(S): at 06:47

## 2020-02-18 RX ADMIN — Medication 600 MILLIGRAM(S): at 17:59

## 2020-02-18 RX ADMIN — Medication 5 MILLIGRAM(S): at 12:15

## 2020-02-18 RX ADMIN — CHLORHEXIDINE GLUCONATE 1 APPLICATION(S): 213 SOLUTION TOPICAL at 09:23

## 2020-02-18 RX ADMIN — Medication 3 MILLILITER(S): at 06:46

## 2020-02-18 RX ADMIN — PANTOPRAZOLE SODIUM 40 MILLIGRAM(S): 20 TABLET, DELAYED RELEASE ORAL at 12:16

## 2020-02-18 RX ADMIN — BUDESONIDE AND FORMOTEROL FUMARATE DIHYDRATE 2 PUFF(S): 160; 4.5 AEROSOL RESPIRATORY (INHALATION) at 06:46

## 2020-02-18 RX ADMIN — Medication 975 MILLIGRAM(S): at 21:55

## 2020-02-18 RX ADMIN — Medication 3 MILLILITER(S): at 12:16

## 2020-02-18 RX ADMIN — OXYCODONE HYDROCHLORIDE 5 MILLIGRAM(S): 5 TABLET ORAL at 21:55

## 2020-02-18 RX ADMIN — OXYCODONE HYDROCHLORIDE 5 MILLIGRAM(S): 5 TABLET ORAL at 22:25

## 2020-02-18 RX ADMIN — Medication 975 MILLIGRAM(S): at 22:25

## 2020-02-18 RX ADMIN — TAMSULOSIN HYDROCHLORIDE 0.4 MILLIGRAM(S): 0.4 CAPSULE ORAL at 21:55

## 2020-02-18 RX ADMIN — Medication 600 MILLIGRAM(S): at 06:46

## 2020-02-18 RX ADMIN — ENOXAPARIN SODIUM 40 MILLIGRAM(S): 100 INJECTION SUBCUTANEOUS at 12:15

## 2020-02-18 RX ADMIN — Medication 12.5 MILLIGRAM(S): at 06:46

## 2020-02-18 RX ADMIN — BUDESONIDE AND FORMOTEROL FUMARATE DIHYDRATE 2 PUFF(S): 160; 4.5 AEROSOL RESPIRATORY (INHALATION) at 17:59

## 2020-02-18 NOTE — PROGRESS NOTE ADULT - ASSESSMENT
76 y/o M presenting with septic shock and high grade SBO s/p exploratory laparotomy, lysis of adhesions, decompression of bowel via enterotomy w/ primary repair, and Abthera VAC placement on 12/6; s/p take down of Abthera, washout and re-application of the Abthera vac on 12/8. 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 s/p L chest tube placement by IR 12/30 for pleural effusion now s/p VATS, with chest tube insertion for drainage of pleural effusion. RRT called 1/13 AM for hypoxia, patient transferred back to SICU.  Patient continued SOB, chest drain possible obstruction expanding. Patent taken back to OR emergently 1/15 for clot evac and VATS. Patient is now s/p Ileostomy reversal 2/4/20. Post-op c/b bloody BM and anemia requiring blood transfusion, now improved. Now tolerating regular diet.    PLAN:  - C/w Marinol for appetite stimulation  - Continue regular diet  - Calorie count, wean TPN  - BID dressing changes (minimal gauze and tape)  - Monitor BM frequency and appearance  - Pain control as needed  - OOB with PT    [facility]  [id number] 76 y/o M presenting with septic shock and high grade SBO s/p exploratory laparotomy, lysis of adhesions, decompression of bowel via enterotomy w/ primary repair, and Abthera VAC placement on 12/6; s/p take down of Abthera, washout and re-application of the Abthera vac on 12/8. 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 s/p L chest tube placement by IR 12/30 for pleural effusion now s/p VATS, with chest tube insertion for drainage of pleural effusion. RRT called 1/13 AM for hypoxia, patient transferred back to SICU.  Patient continued SOB, chest drain possible obstruction expanding. Patent taken back to OR emergently 1/15 for clot evac and VATS. Patient is now s/p Ileostomy reversal 2/4/20. Post-op c/b bloody BM and anemia requiring blood transfusion, now improved. Now tolerating regular diet.    PLAN:  - C/w Marinol for appetite stimulation  - Continue regular diet  - Calorie count, wean TPN  - BID dressing changes (minimal gauze and tape)  - will dc staples  - Monitor BM frequency and appearance  - Pain control as needed  - OOB with PT  - dc planning for rehab  - appreciatre cv/pulm f/u     [facility]  [id number]

## 2020-02-18 NOTE — PROGRESS NOTE ADULT - SUBJECTIVE AND OBJECTIVE BOX
Follow-up Pulm Progress Note    No new respiratory events overnight.  Denies SOB/CP.   94% on RA    Medications:  MEDICATIONS  (STANDING):  albuterol/ipratropium for Nebulization. 3 milliLiter(s) Nebulizer every 6 hours  budesonide 160 MICROgram(s)/formoterol 4.5 MICROgram(s) Inhaler 2 Puff(s) Inhalation two times a day  chlorhexidine 2% Cloths 1 Application(s) Topical <User Schedule>  dronabinol 5 milliGRAM(s) Oral daily  enoxaparin Injectable 40 milliGRAM(s) SubCutaneous daily  ergocalciferol 67804 Unit(s) Oral every week  fat emulsion (Fish Oil and Plant Based) 20% Infusion 10.4 mL/Hr (10.4 mL/Hr) IV Continuous <Continuous>  guaiFENesin  milliGRAM(s) Oral every 12 hours  metoprolol tartrate 12.5 milliGRAM(s) Oral every 12 hours  nystatin/triamcinolone Cream 1 Application(s) Topical two times a day  pantoprazole   Suspension 40 milliGRAM(s) Oral daily  Parenteral Nutrition - Adult 1 Each (83 mL/Hr) TPN Continuous <Continuous>  Parenteral Nutrition - Adult 1 Each (83 mL/Hr) TPN Continuous <Continuous>  tamsulosin 0.4 milliGRAM(s) Oral at bedtime  triamcinolone 0.1% Ointment 1 Application(s) Topical two times a day    MEDICATIONS  (PRN):  acetaminophen   Tablet .. 975 milliGRAM(s) Oral every 6 hours PRN Mild Pain (1 - 3)  benzocaine 15 mG/menthol 3.6 mG (Sugar-Free) Lozenge 1 Lozenge Oral every 4 hours PRN Sore Throat  oxyCODONE    IR 5 milliGRAM(s) Oral every 4 hours PRN Moderate Pain (4 - 6)          Vital Signs Last 24 Hrs  T(C): 36.5 (18 Feb 2020 14:04), Max: 37.4 (17 Feb 2020 17:33)  T(F): 97.7 (18 Feb 2020 14:04), Max: 99.3 (17 Feb 2020 17:33)  HR: 74 (18 Feb 2020 14:04) (74 - 103)  BP: 130/70 (18 Feb 2020 14:04) (109/69 - 145/71)  BP(mean): --  RR: 18 (18 Feb 2020 14:04) (18 - 18)  SpO2: 95% (18 Feb 2020 14:04) (95% - 96%) on RA 02-17 @ 07:01  -  02-18 @ 07:00  --------------------------------------------------------  IN: 1627.4 mL / OUT: 1550 mL / NET: 77.4 mL          LABS:                        7.7    14.13 )-----------( 449      ( 18 Feb 2020 05:49 )             23.7     02-18    135  |  100  |  20  ----------------------------<  97  4.5   |  23  |  0.43<L>    Ca    8.4      18 Feb 2020 05:49  Phos  4.4     02-18  Mg     1.9     02-18    TPro  6.5  /  Alb  2.7<L>  /  TBili  0.4  /  DBili  x   /  AST  85<H>  /  ALT  163<H>  /  AlkPhos  105  02-18      CARDIAC MARKERS ( 18 Feb 2020 05:49 )  x     / x     / 38 U/L / x     / x          CAPILLARY BLOOD GLUCOSE            Physical Examination:  PULM: Diminished BS throughout   CVS: S1, S2 heard    RADIOLOGY REVIEWED

## 2020-02-18 NOTE — PROGRESS NOTE ADULT - SUBJECTIVE AND OBJECTIVE BOX
Subjective: Patient seen and examined. No new events except as noted.     REVIEW OF SYSTEMS:    CONSTITUTIONAL: No weakness, fevers or chills  EYES/ENT: No visual changes;  No vertigo or throat pain   NECK: No pain or stiffness  RESPIRATORY: No cough, wheezing, hemoptysis; No shortness of breath  CARDIOVASCULAR: No chest pain or palpitations  GASTROINTESTINAL: No abdominal or epigastric pain. No nausea, vomiting, or hematemesis; No diarrhea or constipation. No melena or hematochezia.  GENITOURINARY: No dysuria, frequency or hematuria  NEUROLOGICAL: No numbness or weakness  SKIN: No itching, burning, rashes, or lesions   All other review of systems is negative unless indicated above.    MEDICATIONS:  MEDICATIONS  (STANDING):  albuterol/ipratropium for Nebulization. 3 milliLiter(s) Nebulizer every 6 hours  budesonide 160 MICROgram(s)/formoterol 4.5 MICROgram(s) Inhaler 2 Puff(s) Inhalation two times a day  chlorhexidine 2% Cloths 1 Application(s) Topical <User Schedule>  dronabinol 5 milliGRAM(s) Oral daily  enoxaparin Injectable 40 milliGRAM(s) SubCutaneous daily  ergocalciferol 22869 Unit(s) Oral every week  fat emulsion (Fish Oil and Plant Based) 20% Infusion 10.4 mL/Hr (10.4 mL/Hr) IV Continuous <Continuous>  guaiFENesin  milliGRAM(s) Oral every 12 hours  metoprolol tartrate 12.5 milliGRAM(s) Oral every 12 hours  nystatin/triamcinolone Cream 1 Application(s) Topical two times a day  pantoprazole   Suspension 40 milliGRAM(s) Oral daily  Parenteral Nutrition - Adult 1 Each (83 mL/Hr) TPN Continuous <Continuous>  Parenteral Nutrition - Adult 1 Each (83 mL/Hr) TPN Continuous <Continuous>  tamsulosin 0.4 milliGRAM(s) Oral at bedtime  triamcinolone 0.1% Ointment 1 Application(s) Topical two times a day      PHYSICAL EXAM:  T(C): 36.9 (02-18-20 @ 09:05), Max: 37.4 (02-17-20 @ 17:33)  HR: 94 (02-18-20 @ 09:05) (84 - 103)  BP: 118/66 (02-18-20 @ 09:05) (109/69 - 145/71)  RR: 18 (02-18-20 @ 09:05) (18 - 19)  SpO2: 95% (02-18-20 @ 09:05) (95% - 96%)  Wt(kg): --  I&O's Summary    17 Feb 2020 07:01  -  18 Feb 2020 07:00  --------------------------------------------------------  IN: 1627.4 mL / OUT: 1550 mL / NET: 77.4 mL    18 Feb 2020 07:01  -  18 Feb 2020 11:49  --------------------------------------------------------  IN: 320 mL / OUT: 550 mL / NET: -230 mL          Appearance: NAD  HEENT:   Normal oral mucosa, PERRL, EOMI	  Lymphatic: No lymphadenopathy , no edema  Cardiovascular: Normal S1 S2, No JVD, No murmurs , Peripheral pulses palpable 2+ bilaterally  Respiratory: Lungs clear to auscultation, normal effort 	  Gastrointestinal:  Soft, Non-tender, + BS	  Skin: No rashes, No ecchymoses, No cyanosis, warm to touch  Musculoskeletal: Normal range of motion, normal strength  Psychiatry:  Mood & affect appropriate  Ext: No edema      LABS:    CARDIAC MARKERS:  CARDIAC MARKERS ( 18 Feb 2020 05:49 )  x     / x     / 38 U/L / x     / x                                    7.7    14.13 )-----------( 449      ( 18 Feb 2020 05:49 )             23.7     02-18    135  |  100  |  20  ----------------------------<  97  4.5   |  23  |  0.43<L>    Ca    8.4      18 Feb 2020 05:49  Phos  4.4     02-18  Mg     1.9     02-18    TPro  6.5  /  Alb  2.7<L>  /  TBili  0.4  /  DBili  x   /  AST  85<H>  /  ALT  163<H>  /  AlkPhos  105  02-18    proBNP:   Lipid Profile:   HgA1c:   TSH:             TELEMETRY: 	    ECG:  	  RADIOLOGY:   DIAGNOSTIC TESTING:  [ ] Echocardiogram:  [ ]  Catheterization:  [ ] Stress Test:    OTHER:

## 2020-02-18 NOTE — PROGRESS NOTE ADULT - ASSESSMENT
A/P: 75 year old male w/ PMH of COPD and EtOH dependence with PSH/o appy, prostate surgery, & spine surgery  septic shock and high grade SBO s/p exploratory laparotomy, lysis of adhesions, decompression of bowel via enterotomy w/ primary repair, and Abthera VAC placement on 12/6; s/p take down of Abthera, washout and re-application of the Abthera vac on 12/8. Now s/p SBR and end ileostomy on 12/10.   TPN initally consulted to assist w/ management of pt's nutrition in pt with SGS/malabsorption and had high Ileostomy output.  Pt s/p Lt Chest Pigtail for effusion in IR with persistent high output on 1/10/2020. Pt had VATs & placement of Left PleurX catheter. Post op pt developed hemothorax and Respiratory Distress.  Pt s/p Lt chest Evacuation of 2L blood w/ placement of CT x2 on 1/15/2020.  Lt Pleural Effusion resolved and pt doing well after CT removed.  Pt also noted with Rt PNA vs Pleural Effusion that has resolved.  Pt is s/p Ex Lap, MIRYAM, & Closure of End Ileostomy on 2/4/2020.      Pt w/ improving severe Protein-Calorie Malnutrition-         On going monitoring postop after Reversal of Ileostomy in pt w/ Short Gut Syndrome                  w/Malabsorption s/p SBR  Cut TPN to 1/2 strength today, tolerating LRD            w/ elevated AST/ ALT, with nL LDH, Alk Phos, CPK-             Will Continue to monitor if elevated AST/ ALT is from TPN            Consider Hepatology consult         Amino Acids 60g, Dextrose 140g, Lipids 25g in 2000mL with 3mL MTE & 10mL MVI         Pt on LRD- appetite improving- Marinol added & calorie count ongoing         TPN  GOAL:  Amino Acids 120g, Dextrose 210g, and Lipids 50g     -GIB -no bloody BM &  H&H remains stable         bx pending from EGD/ Colonoscopy         Continuing Protonix   -HypoCa- improving w/increased Ca to 16mEq in TPN continue to monitor        Improved Ca levels helpful for BP & muscle contraction  -LowK - improving w/ 80mEq KCl in TPN, continue to monitor        maintaining K & Mg will help with GI motility   -LowMg- improving with increased Mg to 16mEq         continue to monitor as pt on Protonix   -HypoPhos- improving w/-increased NaPhos & will continue to monitor   -Fecal fat testing suggestive of decreased absorption of fat - while pt had ileostomy            will monitor for improved absorption after ileostomy reversal - Loose BM x3 yesterday        Repeat Fat Soluble Vitamin studies        TPN w/ MVI to compensate for low Vit E & A        Vit D levels low- being supplemented w/Ergocalciferol         Vit K INR/ PT near normal suggestive that it is being absorbed   -Strict Intake and Output - continue to closely monitor  -Good glycemic control-  Fingersticks & ISS coverage -   -Weights three times a week  -Daily CMP, Mg, Ionized Ca, Phosphorus,        and Weekly Triglycerides and Pre-albumin  Continue as per Surgery, will follow with you, D/w primary team    Andreina Hubbard PA-C  TPN team, pager [phone]  D/w Dr Chacko A/P: 75 year old male w/ PMH of COPD and EtOH dependence with PSH/o appy, prostate surgery, & spine surgery  septic shock and high grade SBO s/p exploratory laparotomy, lysis of adhesions, decompression of bowel via enterotomy w/ primary repair, and Abthera VAC placement on 12/6; s/p take down of Abthera, washout and re-application of the Abthera vac on 12/8. Now s/p SBR and end ileostomy on 12/10.   TPN initally consulted to assist w/ management of pt's nutrition in pt with SGS/malabsorption and had high Ileostomy output.  Pt s/p Lt Chest Pigtail for effusion in IR with persistent high output on 1/10/2020. Pt had VATs & placement of Left PleurX catheter. Post op pt developed hemothorax and Respiratory Distress.  Pt s/p Lt chest Evacuation of 2L blood w/ placement of CT x2 on 1/15/2020.  Lt Pleural Effusion resolved and pt doing well after CT removed.  Pt also noted with Rt PNA vs Pleural Effusion that has resolved.  Pt is s/p Ex Lap, MIRYAM, & Closure of End Ileostomy on 2/4/2020.      Pt w/ improving severe Protein-Calorie Malnutrition-         On going monitoring postop after Reversal of Ileostomy in pt w/ Short Gut Syndrome                  w/Malabsorption s/p SBR  Cut TPN to 1/2 strength today, tolerating LRD            w/ elevated AST/ ALT, with nL LDH, Alk Phos, CPK-             Will Continue to monitor if elevated AST/ ALT is from TPN            Consider Hepatology consult         Amino Acids 60g, Dextrose 140g, Lipids 25g in 2000mL with 3mL MTE & 10mL MVI         Pt on LRD- appetite improving- Marinol added & calorie count ongoing         TPN  GOAL:  Amino Acids 120g, Dextrose 210g, and Lipids 50g     -GIB -no bloody BM &  H&H remains stable         bx pending from EGD/ Colonoscopy         Continuing Protonix   -Leukocytosis- continue to monitor- no overt signs of infection  -HypoCa- improving w/increased Ca to 16mEq in TPN continue to monitor        Improved Ca levels helpful for BP & muscle contraction  -LowK - improving w/ 80mEq KCl in TPN, continue to monitor        maintaining K & Mg will help with GI motility   -LowMg- improving with increased Mg to 16mEq         continue to monitor as pt on Protonix   -HypoPhos- improving w/-increased NaPhos & will continue to monitor   -Fecal fat testing suggestive of decreased absorption of fat - while pt had ileostomy            will monitor for improved absorption after ileostomy reversal - Loose BM x3 yesterday        Repeat Fat Soluble Vitamin studies        TPN w/ MVI to compensate for low Vit E & A        Vit D levels low- being supplemented w/Ergocalciferol         Vit K INR/ PT near normal suggestive that it is being absorbed   -Strict Intake and Output - continue to closely monitor  -Good glycemic control-  Fingersticks & ISS coverage -   -Weights three times a week  -Daily CMP, Mg, Ionized Ca, Phosphorus,        and Weekly Triglycerides and Pre-albumin  Continue as per Surgery, will follow with you, D/w primary team    Andreina Hubbard PA-C  TPN team, pager [phone]  D/w Dr Chacko A/P: 75 year old male w/ PMH of COPD and EtOH dependence with PSH/o appy, prostate surgery, & spine surgery  septic shock and high grade SBO s/p exploratory laparotomy, lysis of adhesions, decompression of bowel via enterotomy w/ primary repair, and Abthera VAC placement on 12/6; s/p take down of Abthera, washout and re-application of the Abthera vac on 12/8. Now s/p SBR and end ileostomy on 12/10.   TPN initally consulted to assist w/ management of pt's nutrition in pt with SGS/malabsorption and had high Ileostomy output.  Pt s/p Lt Chest Pigtail for effusion in IR with persistent high output on 1/10/2020. Pt had VATs & placement of Left PleurX catheter. Post op pt developed hemothorax and Respiratory Distress.  Pt s/p Lt chest Evacuation of 2L blood w/ placement of CT x2 on 1/15/2020.  Lt Pleural Effusion resolved and pt doing well after CT removed.  Pt also noted with Rt PNA vs Pleural Effusion that has resolved.  Pt is s/p Ex Lap, MIRYAM, & Closure of End Ileostomy on 2/4/2020.      Pt w/ improving severe Protein-Calorie Malnutrition-         On going monitoring postop after Reversal of Ileostomy in pt w/ Short Gut Syndrome                  w/Malabsorption s/p SBR  Cut TPN to 1/2 strength today, tolerating LRD            w/ elevated AST/ ALT, with nL LDH, Alk Phos, CPK-             Will Continue to monitor if elevated AST/ ALT is from TPN            Consider Hepatology consult         1/2 strength TPN: Amino Acids 60g, Dextrose 140g, Lipids 25g in 2000mL with 3mL MTE & 10mL MVI         Pt on LRD- appetite improving- Marinol added & calorie count ongoing         TPN at GOAL:  Amino Acids 120g, Dextrose 210g, and Lipids 50g     -GIB -no bloody BM &  H&H remains stable         bx pending from EGD/ Colonoscopy         Continuing Protonix   -Leukocytosis- continue to monitor- no overt signs of infection  -HypoCa- improving w/increased Ca to 16mEq in TPN continue to monitor        Improved Ca levels helpful for BP & muscle contraction  -LowK - improving w/ 80mEq KCl in TPN, continue to monitor        maintaining K & Mg will help with GI motility   -LowMg- improving with increased Mg to 16mEq         continue to monitor as pt on Protonix   -HypoPhos- improving w/-increased NaPhos & will continue to monitor   -Fecal fat testing suggestive of decreased absorption of fat - while pt had ileostomy            will monitor for improved absorption after ileostomy reversal - Loose BM x3 yesterday        Repeat Fat Soluble Vitamin studies        TPN w/ MVI to compensate for low Vit E & A        Vit D levels low- being supplemented w/Ergocalciferol         Vit K INR/ PT near normal suggestive that it is being absorbed   -Strict Intake and Output - continue to closely monitor  -Good glycemic control-  Fingersticks & ISS coverage -   -Weights three times a week  -Daily CMP, Mg, Ionized Ca, Phosphorus,        and Weekly Triglycerides and Pre-albumin  Continue as per Surgery, will follow with you, D/w primary team    Andreina Hubbard PA-C  TPN team, pager [phone]  D/w Dr Chacko

## 2020-02-18 NOTE — PROGRESS NOTE ADULT - SUBJECTIVE AND OBJECTIVE BOX
S: Patient doing well, denies fevers, chills, nausea, emesis, chest pain, SOB. Continues to tolerate regular diet, reports he is eating about half of each meal as well as one supplemental Ensure per day. Continues to pass flatus and nonbloody BMs. No acute complaints.    O: Vital Signs  T(C): 36.9 (02-18 @ 09:05), Max: 37.4 (02-17 @ 17:33)  HR: 94 (02-18 @ 09:05) (91 - 103)  BP: 118/66 (02-18 @ 09:05) (109/69 - 145/71)  RR: 18 (02-18 @ 09:05) (18 - 18)  SpO2: 95% (02-18 @ 09:05) (95% - 96%)  02-17-20 @ 07:01  -  02-18-20 @ 07:00  --------------------------------------------------------  IN: 1627.4 mL / OUT: 1550 mL / NET: 77.4 mL    02-18-20 @ 07:01  -  02-18-20 @ 12:55  --------------------------------------------------------  IN: 320 mL / OUT: 750 mL / NET: -430 mL      General: alert and oriented, NAD  Resp: airway patent, respirations unlabored  CVS: regular rate and rhythm  Abdomen: soft, nontender, nondistended, incision c/d/i, packing changed on AM rounds  Extremities: no edema  Skin: warm, dry, appropriate color                          7.7    14.13 )-----------( 449      ( 18 Feb 2020 05:49 )             23.7   02-18    135  |  100  |  20  ----------------------------<  97  4.5   |  23  |  0.43<L>    Ca    8.4      18 Feb 2020 05:49  Phos  4.4     02-18  Mg     1.9     02-18    TPro  6.5  /  Alb  2.7<L>  /  TBili  0.4  /  DBili  x   /  AST  85<H>  /  ALT  163<H>  /  AlkPhos  105  02-18

## 2020-02-18 NOTE — CHART NOTE - NSCHARTNOTEFT_GEN_A_CORE
Nutrition Follow Up Note  Patient seen for: Nutrition Follow Up/TPN/Calorie Count    Source: Pt, medical record, TPN team rounds     Chart reviewed, events noted. Pt now s/p ileostomy reversal (20); s/p EGD/colonoscopy for drop in Hct and ongoing melena, now improved. Marinol dose increased to 5mg daily. Pt reports decreased appetite today (unclear etiology). Calorie count initiated to determine quantity of calories/protein ingested via PO diet.     Nutrition Status: Severe malnutrition. Pt with 150 cm small bowel remaining after GI surgery x 3. TPN initiated ; has been tolerating PO diet since . Plan for TPN to be cut to half strength this evening. Noted with elevated AST/ALT; will monitor. Consider hepatology consult. RD to analyze results of calorie count after completion of same.     Adjustments to TPN:   Total volume: 2L  SMOF lipid (grams): 25  Amino Acids (grams): 60  Dextrose (grams): 140  Insulin (units): none; BG well managed  NaCl (mEq): 80 (no change)  Na acetate (mEq): reduced to zero  Na Phos (mmol):  60 (no change)  KCL (mEq): 80 (no change); potassium WNL  Calcium gluconate (mEq): 16 (no dubois); ionized calcium WNL  Mg sulfate (mEq): 16 (no change); magnesium WNL  MTE-5 (ml): 3  MVI (ml): 10     Diet: Low Fiber, Ensure Clear twice three times daily, Ensure Enlive twice three times daily     Parenteral Nutrition: (presumed TPN -): 2L infusing at 83ml/hr (60Gm amino acids, 140Gm dextrose, 25Gm SMOF lipids); to provide: 966kcal (17.9Kcal/Kg and 1.1Gm protein/Kg dosing wt 54.2Kg).    Non-Protein Calories: 726 gregorio/day (13.4cal/Kg)  Dextrose Infusion Rate: 1.8 mg/Kg/min  Lipid Infusion Rate: 0.46 Gm/Kg/day; 0.04 Gm/Kg/hr    Last BM 2/17 x 2.     Daily Weight in k.8 (-), Weight in k.3 (-), Weight in k.2 (-12)  % Weight Change    Pertinent Medications: MEDICATIONS  (STANDING):  albuterol/ipratropium for Nebulization. 3 milliLiter(s) Nebulizer every 6 hours  budesonide 160 MICROgram(s)/formoterol 4.5 MICROgram(s) Inhaler 2 Puff(s) Inhalation two times a day  chlorhexidine 2% Cloths 1 Application(s) Topical <User Schedule>  dronabinol 5 milliGRAM(s) Oral daily  enoxaparin Injectable 40 milliGRAM(s) SubCutaneous daily  ergocalciferol 43646 Unit(s) Oral every week  fat emulsion (Fish Oil and Plant Based) 20% Infusion 10.4 mL/Hr (10.4 mL/Hr) IV Continuous <Continuous>  guaiFENesin  milliGRAM(s) Oral every 12 hours  metoprolol tartrate 12.5 milliGRAM(s) Oral every 12 hours  nystatin/triamcinolone Cream 1 Application(s) Topical two times a day  pantoprazole   Suspension 40 milliGRAM(s) Oral daily  Parenteral Nutrition - Adult 1 Each (83 mL/Hr) TPN Continuous <Continuous>  Parenteral Nutrition - Adult 1 Each (83 mL/Hr) TPN Continuous <Continuous>  tamsulosin 0.4 milliGRAM(s) Oral at bedtime  triamcinolone 0.1% Ointment 1 Application(s) Topical two times a day    MEDICATIONS  (PRN):  acetaminophen   Tablet .. 975 milliGRAM(s) Oral every 6 hours PRN Mild Pain (1 - 3)  benzocaine 15 mG/menthol 3.6 mG (Sugar-Free) Lozenge 1 Lozenge Oral every 4 hours PRN Sore Throat  oxyCODONE    IR 5 milliGRAM(s) Oral every 4 hours PRN Moderate Pain (4 - 6)    Pertinent Labs:  @ 05:49: Na 135, BUN 20, Cr 0.43<L>, BG 97, K+ 4.5, Phos 4.4, Mg 1.9, Alk Phos 105, ALT/SGPT 163<H>, AST/SGOT 85<H>, HbA1c --    Finger Sticks:      Skin per nursing documentation: surgical incision midline abdomen   Edema: none noted    Estimated Needs:   [x] no change since previous assessment  [ ] recalculated:     Previous Nutrition Diagnosis: sever protein calorie malnutrition  Nutrition Diagnosis is: ongoing, being addressed with TPN/PO diet    Interventions:     Recommend  1) TPN per TPN team/Nutrition assessment.   2) Continue Low Fiber diet as tolerated.   3) Continue Ensure Clear 2x three times daily; Ensure Enlive 2x three times daily.  4) Continue Might[employer] supplement TID.   5) RD to monitor and evaluate calorie count after completion to determine adequacy of PO diet.     Monitoring and Evaluation:     Continue to monitor Nutritional intake, Tolerance to diet prescription, weights, labs, skin integrity    RD remains available upon request and will follow up per protocol

## 2020-02-19 DIAGNOSIS — L30.9 DERMATITIS, UNSPECIFIED: ICD-10-CM

## 2020-02-19 DIAGNOSIS — R06.09 OTHER FORMS OF DYSPNEA: ICD-10-CM

## 2020-02-19 LAB
24R-OH-CALCIDIOL SERPL-MCNC: 22.5 NG/ML — LOW (ref 30–80)
ALBUMIN SERPL ELPH-MCNC: 2.7 G/DL — LOW (ref 3.3–5)
ALP SERPL-CCNC: 105 U/L — SIGNIFICANT CHANGE UP (ref 40–120)
ALT FLD-CCNC: 129 U/L — HIGH (ref 10–45)
ANION GAP SERPL CALC-SCNC: 11 MMOL/L — SIGNIFICANT CHANGE UP (ref 5–17)
AST SERPL-CCNC: 46 U/L — HIGH (ref 10–40)
BILIRUB SERPL-MCNC: 0.4 MG/DL — SIGNIFICANT CHANGE UP (ref 0.2–1.2)
BUN SERPL-MCNC: 19 MG/DL — SIGNIFICANT CHANGE UP (ref 7–23)
CA-I BLD-SCNC: 1.21 MMOL/L — SIGNIFICANT CHANGE UP (ref 1.12–1.3)
CALCIUM SERPL-MCNC: 8.8 MG/DL — SIGNIFICANT CHANGE UP (ref 8.4–10.5)
CHLORIDE SERPL-SCNC: 98 MMOL/L — SIGNIFICANT CHANGE UP (ref 96–108)
CO2 SERPL-SCNC: 25 MMOL/L — SIGNIFICANT CHANGE UP (ref 22–31)
CREAT SERPL-MCNC: 0.43 MG/DL — LOW (ref 0.5–1.3)
GLUCOSE SERPL-MCNC: 88 MG/DL — SIGNIFICANT CHANGE UP (ref 70–99)
HCT VFR BLD CALC: 24.9 % — LOW (ref 39–50)
HGB BLD-MCNC: 8 G/DL — LOW (ref 13–17)
MAGNESIUM SERPL-MCNC: 1.8 MG/DL — SIGNIFICANT CHANGE UP (ref 1.6–2.6)
MCHC RBC-ENTMCNC: 30.3 PG — SIGNIFICANT CHANGE UP (ref 27–34)
MCHC RBC-ENTMCNC: 32.1 GM/DL — SIGNIFICANT CHANGE UP (ref 32–36)
MCV RBC AUTO: 94.3 FL — SIGNIFICANT CHANGE UP (ref 80–100)
NRBC # BLD: 0 /100 WBCS — SIGNIFICANT CHANGE UP (ref 0–0)
PHOSPHATE SERPL-MCNC: 4.6 MG/DL — HIGH (ref 2.5–4.5)
PLATELET # BLD AUTO: 456 K/UL — HIGH (ref 150–400)
POTASSIUM SERPL-MCNC: 4.4 MMOL/L — SIGNIFICANT CHANGE UP (ref 3.5–5.3)
POTASSIUM SERPL-SCNC: 4.4 MMOL/L — SIGNIFICANT CHANGE UP (ref 3.5–5.3)
PROT SERPL-MCNC: 7.1 G/DL — SIGNIFICANT CHANGE UP (ref 6–8.3)
RBC # BLD: 2.64 M/UL — LOW (ref 4.2–5.8)
RBC # FLD: 14.3 % — SIGNIFICANT CHANGE UP (ref 10.3–14.5)
SODIUM SERPL-SCNC: 134 MMOL/L — LOW (ref 135–145)
WBC # BLD: 12.38 K/UL — HIGH (ref 3.8–10.5)
WBC # FLD AUTO: 12.38 K/UL — HIGH (ref 3.8–10.5)

## 2020-02-19 PROCEDURE — 71045 X-RAY EXAM CHEST 1 VIEW: CPT | Mod: 26

## 2020-02-19 PROCEDURE — 99233 SBSQ HOSP IP/OBS HIGH 50: CPT

## 2020-02-19 PROCEDURE — 99222 1ST HOSP IP/OBS MODERATE 55: CPT

## 2020-02-19 RX ORDER — DIPHENHYDRAMINE HCL 50 MG
50 CAPSULE ORAL ONCE
Refills: 0 | Status: DISCONTINUED | OUTPATIENT
Start: 2020-02-19 | End: 2020-02-19

## 2020-02-19 RX ORDER — ELECTROLYTE SOLUTION,INJ
1 VIAL (ML) INTRAVENOUS
Refills: 0 | Status: DISCONTINUED | OUTPATIENT
Start: 2020-02-19 | End: 2020-02-19

## 2020-02-19 RX ORDER — I.V. FAT EMULSION 20 G/100ML
10.4 EMULSION INTRAVENOUS
Qty: 25 | Refills: 0 | Status: DISCONTINUED | OUTPATIENT
Start: 2020-02-19 | End: 2020-02-20

## 2020-02-19 RX ORDER — MAGNESIUM SULFATE 500 MG/ML
2 VIAL (ML) INJECTION ONCE
Refills: 0 | Status: COMPLETED | OUTPATIENT
Start: 2020-02-19 | End: 2020-02-19

## 2020-02-19 RX ADMIN — Medication 3 MILLILITER(S): at 14:46

## 2020-02-19 RX ADMIN — Medication 3 MILLILITER(S): at 05:49

## 2020-02-19 RX ADMIN — Medication 3 MILLILITER(S): at 18:07

## 2020-02-19 RX ADMIN — Medication 1 EACH: at 00:07

## 2020-02-19 RX ADMIN — Medication 1 APPLICATION(S): at 18:10

## 2020-02-19 RX ADMIN — Medication 975 MILLIGRAM(S): at 23:13

## 2020-02-19 RX ADMIN — Medication 3 MILLILITER(S): at 23:14

## 2020-02-19 RX ADMIN — Medication 1 APPLICATION(S): at 05:49

## 2020-02-19 RX ADMIN — Medication 12.5 MILLIGRAM(S): at 18:08

## 2020-02-19 RX ADMIN — Medication 600 MILLIGRAM(S): at 06:32

## 2020-02-19 RX ADMIN — Medication 12.5 MILLIGRAM(S): at 06:03

## 2020-02-19 RX ADMIN — CHLORHEXIDINE GLUCONATE 1 APPLICATION(S): 213 SOLUTION TOPICAL at 14:45

## 2020-02-19 RX ADMIN — PANTOPRAZOLE SODIUM 40 MILLIGRAM(S): 20 TABLET, DELAYED RELEASE ORAL at 14:46

## 2020-02-19 RX ADMIN — ENOXAPARIN SODIUM 40 MILLIGRAM(S): 100 INJECTION SUBCUTANEOUS at 14:47

## 2020-02-19 RX ADMIN — Medication 50 GRAM(S): at 14:45

## 2020-02-19 RX ADMIN — Medication 600 MILLIGRAM(S): at 18:08

## 2020-02-19 RX ADMIN — I.V. FAT EMULSION 10.4 ML/HR: 20 EMULSION INTRAVENOUS at 18:11

## 2020-02-19 RX ADMIN — BUDESONIDE AND FORMOTEROL FUMARATE DIHYDRATE 2 PUFF(S): 160; 4.5 AEROSOL RESPIRATORY (INHALATION) at 18:07

## 2020-02-19 RX ADMIN — BUDESONIDE AND FORMOTEROL FUMARATE DIHYDRATE 2 PUFF(S): 160; 4.5 AEROSOL RESPIRATORY (INHALATION) at 05:50

## 2020-02-19 RX ADMIN — Medication 1 EACH: at 18:11

## 2020-02-19 RX ADMIN — TAMSULOSIN HYDROCHLORIDE 0.4 MILLIGRAM(S): 0.4 CAPSULE ORAL at 21:20

## 2020-02-19 RX ADMIN — OXYCODONE HYDROCHLORIDE 5 MILLIGRAM(S): 5 TABLET ORAL at 23:13

## 2020-02-19 RX ADMIN — I.V. FAT EMULSION 10.4 ML/HR: 20 EMULSION INTRAVENOUS at 00:07

## 2020-02-19 RX ADMIN — Medication 5 MILLIGRAM(S): at 14:46

## 2020-02-19 NOTE — PROGRESS NOTE ADULT - SUBJECTIVE AND OBJECTIVE BOX
INTERVAL HPI/OVERNIGHT EVENTS:  Minimal improvement in redness with triamcinolone applied twice daily x 2 weeks.   Patient notes some itchiness intermittently.     HPI:    75 y/o male presenting with high grade SBO s/p exploratory laparotomy, lysis of adhesions, decompression of bowel via enterotomy w/ primary repair, & Abthera VAC placement (12/06/2019); RTOR for re-exploration w/ Abthera VAC replacement (12/08/2019) to allow for demarcation of ischemic small bowel; RTOR for re-exploration, small bowel resection of 150 cm (150 cm remaining), ileocecetomy, end ileostomy, mucous fistula, and abdominal closure (12/10/2019); hospital course complicated by SVT, short bowel syndrome requiring repletions w/ IV fluids, malnutrition requiring TPN, intermittent episodes of hypotension, spontaneous left pneumothorax s/p pigtail catheter (12/15/2019-12/24/2019), left pleural effusion s/p pigtail catheter w/ IR (12/30/2019), dysphagia, and oral HSV lesions, placement of Pleurx on 1/10, now re-admitted to SICU with acute hypoxic respiratory failure s/p repeat VATS with 2 liter hemothorax removed. Respiratory failure resolved, now stable in SICU.    Dermatology is consulted for rash on back x several weeks. Itchy. Team has been applying Benadryl cream without much improvement.     MEDICATIONS  (STANDING):  albuterol/ipratropium for Nebulization. 3 milliLiter(s) Nebulizer every 6 hours  budesonide 160 MICROgram(s)/formoterol 4.5 MICROgram(s) Inhaler 2 Puff(s) Inhalation two times a day  chlorhexidine 2% Cloths 1 Application(s) Topical <User Schedule>  dronabinol 5 milliGRAM(s) Oral daily  enoxaparin Injectable 40 milliGRAM(s) SubCutaneous daily  ergocalciferol 15068 Unit(s) Oral every week  fat emulsion (Fish Oil and Plant Based) 20% Infusion 10.4 mL/Hr (10.4 mL/Hr) IV Continuous <Continuous>  guaiFENesin  milliGRAM(s) Oral every 12 hours  metoprolol tartrate 12.5 milliGRAM(s) Oral every 12 hours  nystatin/triamcinolone Cream 1 Application(s) Topical two times a day  pantoprazole   Suspension 40 milliGRAM(s) Oral daily  Parenteral Nutrition - Adult 1 Each (83 mL/Hr) TPN Continuous <Continuous>  tamsulosin 0.4 milliGRAM(s) Oral at bedtime  triamcinolone 0.1% Ointment 1 Application(s) Topical two times a day    MEDICATIONS  (PRN):  acetaminophen   Tablet .. 975 milliGRAM(s) Oral every 6 hours PRN Mild Pain (1 - 3)  oxyCODONE    IR 5 milliGRAM(s) Oral every 4 hours PRN Moderate Pain (4 - 6)      Allergies    IV Contrast (Hives)    Intolerances        REVIEW OF SYSTEMS      General: no fevers/chills, no NS	    Skin: see HPI  	  Ophthalmologic: no eye pain or change in vision    Genitourinary: no dysuria or hematuria    Musculoskeletal: no joint pains or weakness	    Neurological:no weakness or tingling          Vital Signs Last 24 Hrs  T(C): 37.4 (19 Feb 2020 16:43), Max: 37.4 (19 Feb 2020 16:43)  T(F): 99.3 (19 Feb 2020 16:43), Max: 99.3 (19 Feb 2020 16:43)  HR: 106 (19 Feb 2020 17:07) (75 - 106)  BP: 102/64 (19 Feb 2020 17:07) (102/64 - 121/68)  BP(mean): --  RR: 18 (19 Feb 2020 16:43) (18 - 18)  SpO2: 94% (19 Feb 2020 17:07) (94% - 99%)    PHYSICAL EXAM:   The patient was alert and oriented X 3, well nourished, and in no  apparent distress.  There was no visible lymphadenopathy.  Conjunctiva were non injected  There was no clubbing or edema of extremities.    Of note on skin exam:   Red eczematous papules and plaques on back with collarettes of scale, no vesicles or pustules noted, few on chest    LABS:                        8.0    12.38 )-----------( 456      ( 19 Feb 2020 07:44 )             24.9     02-19    134<L>  |  98  |  19  ----------------------------<  88  4.4   |  25  |  0.43<L>    Ca    8.8      19 Feb 2020 07:44  Phos  4.6     02-19  Mg     1.8     02-19    TPro  7.1  /  Alb  2.7<L>  /  TBili  0.4  /  DBili  x   /  AST  46<H>  /  ALT  129<H>  /  AlkPhos  105  02-19          RADIOLOGY & ADDITIONAL TESTS:

## 2020-02-19 NOTE — PROGRESS NOTE ADULT - SUBJECTIVE AND OBJECTIVE BOX
[facility] NUTRITION SUPPORT / TPN -- FOLLOW UP NOTE  --------------------------------------------------------------------------------    24 hour events/subjective:  (+)flatus  (+)loose BMs  Waiting for breakfast- no appetite-doesn't know why-         he's forcing himself to eat 1/2 of his meals         Marinol was started        calorie count initiated  Pt on Protonix suspension QD  improving abdominal Pain - relief w/ pain Rx  OOB ad rachel- likes to walk around the unit in the afternoon  No n/v  No cough/ cp/ palp/dyspnea/ sob  No f/c/s    Diet:  Diet, Low Fiber:   Supplement Feeding Modality:  Oral  Ensure Clear Cans or Servings Per Day:  2       Frequency:  Three Times a day  Ensure Enlive Cans or Servings Per Day:  2       Frequency:  Three Times a day (02-14-20 @ 10:07)      Appetite: [ x ]Poor [  ]Adequate [  ]Good  Caloric intake:  [  x ]  Adequate   [   ] Inadequate    ROS: General/ GI see HPI  all other systems negative      ALLERGIES & MEDICATIONS  --------------------------------------------------------------------------------  ALLERGIES  IV Contrast (Hives)    STANDING INPATIENT MEDICATIONS    albuterol/ipratropium for Nebulization. 3 milliLiter(s) Nebulizer every 6 hours  budesonide 160 MICROgram(s)/formoterol 4.5 MICROgram(s) Inhaler 2 Puff(s) Inhalation two times a day  chlorhexidine 2% Cloths 1 Application(s) Topical <User Schedule>  dronabinol 5 milliGRAM(s) Oral daily  enoxaparin Injectable 40 milliGRAM(s) SubCutaneous daily  ergocalciferol 88057 Unit(s) Oral every week  fat emulsion (Fish Oil and Plant Based) 20% Infusion 10.4 mL/Hr IV Continuous <Continuous>  guaiFENesin  milliGRAM(s) Oral every 12 hours  magnesium sulfate  IVPB 2 Gram(s) IV Intermittent once  metoprolol tartrate 12.5 milliGRAM(s) Oral every 12 hours  nystatin/triamcinolone Cream 1 Application(s) Topical two times a day  pantoprazole   Suspension 40 milliGRAM(s) Oral daily  Parenteral Nutrition - Adult 1 Each TPN Continuous <Continuous>  tamsulosin 0.4 milliGRAM(s) Oral at bedtime  triamcinolone 0.1% Ointment 1 Application(s) Topical two times a day      PRN INPATIENT MEDICATION  acetaminophen   Tablet .. 975 milliGRAM(s) Oral every 6 hours PRN  oxyCODONE    IR 5 milliGRAM(s) Oral every 4 hours PRN        VITALS/PHYSICAL EXAM  --------------------------------------------------------------------------------  T(C): 36.8 (02-19-20 @ 09:21), Max: 37.3 (02-19-20 @ 00:05)  HR: 75 (02-19-20 @ 09:21) (74 - 98)  BP: 121/68 (02-19-20 @ 09:21) (111/65 - 130/70)  RR: 18 (02-19-20 @ 09:21) (18 - 18)  SpO2: 99% (02-19-20 @ 09:21) (95% - 99%)  Wt(kg): --        02-18-20 @ 07:01  -  02-19-20 @ 07:00  --------------------------------------------------------  IN: 2947.2 mL / OUT: 2100 mL / NET: 847.2 mL    02-19-20 @ 07:01  -  02-19-20 @ 10:04  --------------------------------------------------------  IN: 240 mL / OUT: 200 mL / NET: 40 mL      PHYSICAL EXAM:  --------------------------------------------------------------------------------  	Gen: guarded but stable, A&Ox3  	HEENT: NC/AT, PERRL, supple neck, trachea midline, mucosa moist              GI: (+) BS, nondistended, nontender                    midline & Rt sided ostomy dressing c/d/i              MSK: FROM x4, no contractures nor deformaties                   LLE w/ brace   	Vascular: Equally Warm,  no edema, no clubbing, cyanosis,                      RUE PICC w/o sx infection  	Neuro: No focal deficits, grossly intact sensation, & strength   	Psych: Normal affect and mood              skin: moist, good turgor, erythematous pinpoint rash chest/ back        LABS/ CULTURES/ RADIOLOGY:              8.0    12.38 >-----------<  456      [02-19-20 @ 07:44]              24.9     134  |  98  |  19  ----------------------------<  88      [02-19-20 @ 07:44]  4.4   |  25  |  0.43        Ca     8.8     [02-19-20 @ 07:44]      iCa    1.21     [02-19 @ 07:45]      Mg     1.8     [02-19-20 @ 07:44]      Phos  4.6     [02-19-20 @ 07:44]    TPro  7.1  /  Alb  2.7  /  TBili  0.4  /  DBili  x   /  AST  46  /  ALT  129  /  AlkPhos  105  [02-19-20 @ 07:44]        CK 38      [02-18-20 @ 05:49]        [02-18-20 @ 05:49]      CAPILLARY BLOOD GLUCOSE  Prealbumin, Serum: 16 mg/dL (02-15-20 @ 12:30)  Prealbumin, Serum: 17 mg/dL (02-10-20 @ 09:15)  Prealbumin, Serum: 21 mg/dL (02-03-20 @ 23:50)  Prealbumin, Serum: 19 mg/dL (02-03-20 @ 07:18)  Prealbumin, Serum: 18 mg/dL (02-02-20 @ 09:48)       Triglycerides, Serum: 44 mg/dL (02.15.20 @ 07:17)  Triglycerides, Serum: 59 mg/dL (02.10.20 @ 01:03)  Triglycerides, Serum: 53 mg/dL (02.03.20 @ 21:38)  Triglycerides, Serum: 61 mg/dL (02.02.20 @ 01:41) [facility] NUTRITION SUPPORT / TPN -- FOLLOW UP NOTE  --------------------------------------------------------------------------------    24 hour events/subjective:  (+)flatus  (+)loose BMs  Waiting for breakfast- no appetite-doesn't know why-         he's forcing himself to eat 1/2 of his meals         Marinol was started        calorie count initiated  Pt on Protonix suspension QD  improving abdominal Pain - relief w/ pain Rx  OOB ad rachel- likes to walk around the unit in the afternoon  No n/v  No cough/ cp/ palp/dyspnea/ sob  No f/c/s    Diet:  Diet, Low Fiber:   Supplement Feeding Modality:  Oral  Ensure Clear Cans or Servings Per Day:  2       Frequency:  Three Times a day  Ensure Enlive Cans or Servings Per Day:  2       Frequency:  Three Times a day (02-14-20 @ 10:07)      Appetite: [ x ]Poor [  ]Adequate [  ]Good  Caloric intake:  [  x ]  Adequate   [   ] Inadequate    ROS: General/ GI see HPI  all other systems negative      ALLERGIES & MEDICATIONS  --------------------------------------------------------------------------------  ALLERGIES  IV Contrast (Hives)    STANDING INPATIENT MEDICATIONS    albuterol/ipratropium for Nebulization. 3 milliLiter(s) Nebulizer every 6 hours  budesonide 160 MICROgram(s)/formoterol 4.5 MICROgram(s) Inhaler 2 Puff(s) Inhalation two times a day  chlorhexidine 2% Cloths 1 Application(s) Topical <User Schedule>  dronabinol 5 milliGRAM(s) Oral daily  enoxaparin Injectable 40 milliGRAM(s) SubCutaneous daily  ergocalciferol 78856 Unit(s) Oral every week  fat emulsion (Fish Oil and Plant Based) 20% Infusion 10.4 mL/Hr IV Continuous <Continuous>  guaiFENesin  milliGRAM(s) Oral every 12 hours  magnesium sulfate  IVPB 2 Gram(s) IV Intermittent once  metoprolol tartrate 12.5 milliGRAM(s) Oral every 12 hours  nystatin/triamcinolone Cream 1 Application(s) Topical two times a day  pantoprazole   Suspension 40 milliGRAM(s) Oral daily  Parenteral Nutrition - Adult 1 Each TPN Continuous <Continuous>  tamsulosin 0.4 milliGRAM(s) Oral at bedtime  triamcinolone 0.1% Ointment 1 Application(s) Topical two times a day      PRN INPATIENT MEDICATION  acetaminophen   Tablet .. 975 milliGRAM(s) Oral every 6 hours PRN  oxyCODONE    IR 5 milliGRAM(s) Oral every 4 hours PRN        VITALS/PHYSICAL EXAM  --------------------------------------------------------------------------------  T(C): 36.8 (02-19-20 @ 09:21), Max: 37.3 (02-19-20 @ 00:05)  HR: 75 (02-19-20 @ 09:21) (74 - 98)  BP: 121/68 (02-19-20 @ 09:21) (111/65 - 130/70)  RR: 18 (02-19-20 @ 09:21) (18 - 18)  SpO2: 99% (02-19-20 @ 09:21) (95% - 99%)  Wt(kg): --        02-18-20 @ 07:01  -  02-19-20 @ 07:00  --------------------------------------------------------  IN: 2947.2 mL / OUT: 2100 mL / NET: 847.2 mL    02-19-20 @ 07:01  -  02-19-20 @ 10:04  --------------------------------------------------------  IN: 240 mL / OUT: 200 mL / NET: 40 mL      PHYSICAL EXAM:  --------------------------------------------------------------------------------  	Gen: guarded but stable, A&Ox3  	HEENT: NC/AT, PERRL, supple neck, trachea midline, mucosa moist              GI: (+) BS, nondistended, nontender                    midline & Rt sided ostomy dressing c/d/i              MSK: FROM x4, no contractures nor deformaties                   LLE w/ brace   	Vascular: Equally Warm,  no edema, no clubbing, cyanosis,                      RUE PICC w/o sx infection  	Neuro: No focal deficits, grossly intact sensation, & strength   	Psych: Normal affect and mood              skin: moist, good turgor, erythematous pinpoint rash chest/ back        LABS/ CULTURES/ RADIOLOGY:              8.0    12.38 >-----------<  456      [02-19-20 @ 07:44]              24.9     134  |  98  |  19  ----------------------------<  88      [02-19-20 @ 07:44]  4.4   |  25  |  0.43        Ca     8.8     [02-19-20 @ 07:44]      iCa    1.21     [02-19 @ 07:45]      Mg     1.8     [02-19-20 @ 07:44]      Phos  4.6     [02-19-20 @ 07:44]    TPro  7.1  /  Alb  2.7  /  TBili  0.4  /  DBili  x   /  AST  46  /  ALT  129  /  AlkPhos  105  [02-19-20 @ 07:44]        CK 38      [02-18-20 @ 05:49]        [02-18-20 @ 05:49]      CAPILLARY BLOOD GLUCOSE  Prealbumin, Serum: 16 mg/dL (02-15-20 @ 12:30)  Prealbumin, Serum: 17 mg/dL (02-10-20 @ 09:15)  Prealbumin, Serum: 21 mg/dL (02-03-20 @ 23:50)  Prealbumin, Serum: 19 mg/dL (02-03-20 @ 07:18)  Prealbumin, Serum: 18 mg/dL (02-02-20 @ 09:48)       Triglycerides, Serum: 44 mg/dL (02.15.20 @ 07:17)  Triglycerides, Serum: 59 mg/dL (02.10.20 @ 01:03)  Triglycerides, Serum: 53 mg/dL (02.03.20 @ 21:38)  Triglycerides, Serum: 61 mg/dL (02.02.20 @ 01:41)    Surgical Pathology Report (02.12.20 @ 15:52)    Surgical Pathology Report:   ACCESSION No:  [id number]    GAURAV WILLIS                         1  Surgical Final Report  Final Diagnosis  1. Gastric, biopsy:  - Gastric antral-oxyntic mucosa with chronic gastritis.  - No definite H. pylori identified (special stain).  - Negative for intestinal metaplasia.  - Addendum report for H. pylori immunohistochemistry to  follow.    Verified by: Nicho Rasheed M.D.  (Electronic Signature)  Reported on: 02/17/20 13:07 [facility], [address] [place] [address]  Phone: [phone]   Fax: [phone]  _________________________________________________________________    Clinical History  Pre-diagnosis: Melena  Post diagnosis: Nodular stomach  Rule out H. pylori    Specimen(s) Submitted  1     Gastric, biopsy    Gross Description  The specimen is received in formalin and the specimen container  is labeled: Gastric biopsy.  It consists of a five fragments of  tan soft tissue ranging from 0.2 cm to 0.4 cm in greatest  dimension fragment.  Specialstains requested. Entirely  submitted.  One cassette.    In addition to other data that may appear on the specimen  container, the label has been inspected to confirm the presence  of the patient's name and date of birth.      LARA Garcia ASCP 02/12/202

## 2020-02-19 NOTE — PROGRESS NOTE ADULT - SUBJECTIVE AND OBJECTIVE BOX
Subjective: Patient seen and examined. No new events except as noted.   resting comfortably     REVIEW OF SYSTEMS:    CONSTITUTIONAL: No weakness, fevers or chills  EYES/ENT: No visual changes;  No vertigo or throat pain   NECK: No pain or stiffness  RESPIRATORY: No cough, wheezing, hemoptysis; No shortness of breath  CARDIOVASCULAR: No chest pain or palpitations  GASTROINTESTINAL: No abdominal or epigastric pain. No nausea, vomiting, or hematemesis; No diarrhea or constipation. No melena or hematochezia.  GENITOURINARY: No dysuria, frequency or hematuria  NEUROLOGICAL: No numbness or weakness  SKIN: No itching, burning, rashes, or lesions   All other review of systems is negative unless indicated above.    MEDICATIONS:  MEDICATIONS  (STANDING):  albuterol/ipratropium for Nebulization. 3 milliLiter(s) Nebulizer every 6 hours  budesonide 160 MICROgram(s)/formoterol 4.5 MICROgram(s) Inhaler 2 Puff(s) Inhalation two times a day  chlorhexidine 2% Cloths 1 Application(s) Topical <User Schedule>  dronabinol 5 milliGRAM(s) Oral daily  enoxaparin Injectable 40 milliGRAM(s) SubCutaneous daily  ergocalciferol 79604 Unit(s) Oral every week  fat emulsion (Fish Oil and Plant Based) 20% Infusion 10.4 mL/Hr (10.4 mL/Hr) IV Continuous <Continuous>  guaiFENesin  milliGRAM(s) Oral every 12 hours  magnesium sulfate  IVPB 2 Gram(s) IV Intermittent once  metoprolol tartrate 12.5 milliGRAM(s) Oral every 12 hours  nystatin/triamcinolone Cream 1 Application(s) Topical two times a day  pantoprazole   Suspension 40 milliGRAM(s) Oral daily  Parenteral Nutrition - Adult 1 Each (83 mL/Hr) TPN Continuous <Continuous>  Parenteral Nutrition - Adult 1 Each (83 mL/Hr) TPN Continuous <Continuous>  tamsulosin 0.4 milliGRAM(s) Oral at bedtime  triamcinolone 0.1% Ointment 1 Application(s) Topical two times a day      PHYSICAL EXAM:  T(C): 36.8 (02-19-20 @ 09:21), Max: 37.3 (02-19-20 @ 00:05)  HR: 75 (02-19-20 @ 09:21) (74 - 98)  BP: 121/68 (02-19-20 @ 09:21) (111/65 - 130/70)  RR: 18 (02-19-20 @ 09:21) (18 - 18)  SpO2: 99% (02-19-20 @ 09:21) (95% - 99%)  Wt(kg): --  I&O's Summary    18 Feb 2020 07:01  -  19 Feb 2020 07:00  --------------------------------------------------------  IN: 2947.2 mL / OUT: 2100 mL / NET: 847.2 mL    19 Feb 2020 07:01  -  19 Feb 2020 11:59  --------------------------------------------------------  IN: 240 mL / OUT: 200 mL / NET: 40 mL          Appearance: Normal	  HEENT:   Normal oral mucosa, PERRL, EOMI	  Lymphatic: No lymphadenopathy , no edema  Cardiovascular: Normal S1 S2, No JVD, No murmurs , Peripheral pulses palpable 2+ bilaterally  Respiratory: Lungs clear to auscultation, normal effort 	  Gastrointestinal:  Soft, Non-tender, + BS	  Skin: No rashes, No ecchymoses, No cyanosis, warm to touch  Musculoskeletal: Normal range of motion, normal strength  Psychiatry:  Mood & affect appropriate  Ext: No edema      LABS:    CARDIAC MARKERS:  CARDIAC MARKERS ( 18 Feb 2020 05:49 )  x     / x     / 38 U/L / x     / x                                    8.0    12.38 )-----------( 456      ( 19 Feb 2020 07:44 )             24.9     02-19    134<L>  |  98  |  19  ----------------------------<  88  4.4   |  25  |  0.43<L>    Ca    8.8      19 Feb 2020 07:44  Phos  4.6     02-19  Mg     1.8     02-19    TPro  7.1  /  Alb  2.7<L>  /  TBili  0.4  /  DBili  x   /  AST  46<H>  /  ALT  129<H>  /  AlkPhos  105  02-19    proBNP:   Lipid Profile:   HgA1c:   TSH:             TELEMETRY: 	    ECG:  	  RADIOLOGY:   DIAGNOSTIC TESTING:  [ ] Echocardiogram:  [ ]  Catheterization:  [ ] Stress Test:    OTHER:

## 2020-02-19 NOTE — PROGRESS NOTE ADULT - SUBJECTIVE AND OBJECTIVE BOX
Follow-up Pulm Progress Note    Reports GUTIERREZ, states takes him about 30 min to recover  Denies SOB at rest  Sats 94% RA at rest  Denies CP    Medications:  MEDICATIONS  (STANDING):  albuterol/ipratropium for Nebulization. 3 milliLiter(s) Nebulizer every 6 hours  budesonide 160 MICROgram(s)/formoterol 4.5 MICROgram(s) Inhaler 2 Puff(s) Inhalation two times a day  chlorhexidine 2% Cloths 1 Application(s) Topical <User Schedule>  dronabinol 5 milliGRAM(s) Oral daily  enoxaparin Injectable 40 milliGRAM(s) SubCutaneous daily  ergocalciferol 29664 Unit(s) Oral every week  fat emulsion (Fish Oil and Plant Based) 20% Infusion 10.4 mL/Hr (10.4 mL/Hr) IV Continuous <Continuous>  guaiFENesin  milliGRAM(s) Oral every 12 hours  magnesium sulfate  IVPB 2 Gram(s) IV Intermittent once  metoprolol tartrate 12.5 milliGRAM(s) Oral every 12 hours  nystatin/triamcinolone Cream 1 Application(s) Topical two times a day  pantoprazole   Suspension 40 milliGRAM(s) Oral daily  Parenteral Nutrition - Adult 1 Each (83 mL/Hr) TPN Continuous <Continuous>  Parenteral Nutrition - Adult 1 Each (83 mL/Hr) TPN Continuous <Continuous>  tamsulosin 0.4 milliGRAM(s) Oral at bedtime  triamcinolone 0.1% Ointment 1 Application(s) Topical two times a day    MEDICATIONS  (PRN):  acetaminophen   Tablet .. 975 milliGRAM(s) Oral every 6 hours PRN Mild Pain (1 - 3)  oxyCODONE    IR 5 milliGRAM(s) Oral every 4 hours PRN Moderate Pain (4 - 6)          Vital Signs Last 24 Hrs  T(C): 36.9 (19 Feb 2020 12:28), Max: 37.3 (19 Feb 2020 00:05)  T(F): 98.5 (19 Feb 2020 12:28), Max: 99.2 (19 Feb 2020 00:05)  HR: 93 (19 Feb 2020 12:28) (74 - 98)  BP: 108/67 (19 Feb 2020 12:28) (108/67 - 130/70)  BP(mean): --  RR: 18 (19 Feb 2020 12:28) (18 - 18)  SpO2: 95% (19 Feb 2020 12:28) (95% - 99%)          02-18 @ 07:01  -  02-19 @ 07:00  --------------------------------------------------------  IN: 2947.2 mL / OUT: 2100 mL / NET: 847.2 mL          LABS:                        8.0    12.38 )-----------( 456      ( 19 Feb 2020 07:44 )             24.9     02-19    134<L>  |  98  |  19  ----------------------------<  88  4.4   |  25  |  0.43<L>    Ca    8.8      19 Feb 2020 07:44  Phos  4.6     02-19  Mg     1.8     02-19    TPro  7.1  /  Alb  2.7<L>  /  TBili  0.4  /  DBili  x   /  AST  46<H>  /  ALT  129<H>  /  AlkPhos  105  02-19      CARDIAC MARKERS ( 18 Feb 2020 05:49 )  x     / x     / 38 U/L / x     / x              Physical Examination:  PULM: diminished BS throughout   CVS: RRR    RADIOLOGY REVIEWED  CXR 2/5: L costophrenic angle off film  grossly clear

## 2020-02-19 NOTE — PROGRESS NOTE ADULT - ASSESSMENT
76 y/o M presenting with septic shock and high grade SBO s/p exploratory laparotomy, lysis of adhesions, decompression of bowel via enterotomy w/ primary repair, and Abthera VAC placement on 12/6; s/p take down of Abthera, washout and re-application of the Abthera vac on 12/8. 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 s/p L chest tube placement by IR 12/30 for pleural effusion now s/p VATS, with chest tube insertion for drainage of pleural effusion. RRT called 1/13 AM for hypoxia, patient transferred back to SICU.  Patient continued SOB, chest drain possible obstruction expanding. Patent taken back to OR emergently 1/15 for clot evac and VATS. Patient is now s/p Ileostomy reversal 2/4/20. Post-op c/b bloody BM and anemia requiring blood transfusion, now improved. Now tolerating regular diet.    PLAN:  - C/w Marinol for appetite stimulation  - Continue regular diet  - Calorie count, wean TPN  - BID dressing changes (minimal gauze and tape)  - Monitor BM frequency and appearance  - Pain control as needed  - OOB with PT  - Dc planning for rehab  - Appreciate cv/pulm f/u   - Dermatology consulted for new rash on back    [facility]  [id number]

## 2020-02-19 NOTE — PROGRESS NOTE ADULT - PROBLEM SELECTOR PLAN 1
likely 2nd to deconditioning  -Check CXR  -No hypoxia with ambulation, sats maintained >92%  -LE duplex neg DVT 2/14

## 2020-02-19 NOTE — PROGRESS NOTE ADULT - SUBJECTIVE AND OBJECTIVE BOX
S: Patient doing well, continues to tolerate regular diet, passing flatus and BM, ambulating with PT and OOB to chair. C/o rash on upper back which he noticed 2 days ago, states it is pruritic when touched but not painfu.    O: Vital Signs  T(C): 36.8 (02-19 @ 09:21), Max: 37.3 (02-19 @ 00:05)  HR: 75 (02-19 @ 09:21) (74 - 98)  BP: 121/68 (02-19 @ 09:21) (111/65 - 130/70)  RR: 18 (02-19 @ 09:21) (18 - 18)  SpO2: 99% (02-19 @ 09:21) (95% - 99%)  02-18-20 @ 07:01  -  02-19-20 @ 07:00  --------------------------------------------------------  IN: 2947.2 mL / OUT: 2100 mL / NET: 847.2 mL    02-19-20 @ 07:01  -  02-19-20 @ 09:30  --------------------------------------------------------  IN: 240 mL / OUT: 200 mL / NET: 40 mL      General: alert and oriented, NAD  Resp: airway patent, respirations unlabored  CVS: regular rate and rhythm  Abdomen: soft, nontender, nondistended, incision intact, staples removed at bedside, former stoma site with fibrinous exudate at base of wound, no drainage, nothing expressed from wound; superior aspect of incision with open wound, also with fibrinous exudate at base no drainage, gently packed with wet to dry gauze  Back: upper back with diffuse blanching maculopapular rash, no open wounds, no pustules, nontender  Extremities: no edema  Skin: warm, dry, appropriate color                          8.0    12.38 )-----------( 456      ( 19 Feb 2020 07:44 )             24.9   02-19    134<L>  |  98  |  19  ----------------------------<  88  4.4   |  25  |  0.43<L>    Ca    8.8      19 Feb 2020 07:44  Phos  4.6     02-19  Mg     1.8     02-19    TPro  7.1  /  Alb  2.7<L>  /  TBili  0.4  /  DBili  x   /  AST  46<H>  /  ALT  129<H>  /  AlkPhos  105  02-19

## 2020-02-19 NOTE — PROGRESS NOTE ADULT - ASSESSMENT
A/P: 75 year old male w/ PMH of COPD and EtOH dependence with PSH/o appy, prostate surgery, & spine surgery  septic shock and high grade SBO s/p exploratory laparotomy, lysis of adhesions, decompression of bowel via enterotomy w/ primary repair, and Abthera VAC placement on 12/6; s/p take down of Abthera, washout and re-application of the Abthera vac on 12/8. Now s/p SBR and end ileostomy on 12/10.   TPN initally consulted to assist w/ management of pt's nutrition in pt with SGS/malabsorption and had high Ileostomy output.  Pt s/p Lt Chest Pigtail for effusion in IR with persistent high output on 1/10/2020. Pt had VATs & placement of Left PleurX catheter. Post op pt developed hemothorax and Respiratory Distress.  Pt s/p Lt chest Evacuation of 2L blood w/ placement of CT x2 on 1/15/2020.  Lt Pleural Effusion resolved and pt doing well after CT removed.  Pt also noted with Rt PNA vs Pleural Effusion that has resolved.  Pt is s/p Ex Lap, MIRYAM, & Closure of End Ileostomy on 2/4/2020.      Pt w/ improving severe Protein-Calorie Malnutrition-         On going monitoring postop after Reversal of Ileostomy in pt w/ Short Gut Syndrome                  w/Malabsorption s/p SBR  Cut TPN to 1/2 strength today, tolerating LRD            improving AST/ ALT, with nL LDH, Alk Phos, CPK- continue to monitor                 Consider Hepatology consult         1/2 strength TPN: Amino Acids 60g, Dextrose 140g, Lipids 25g in 2000mL with 3mL MTE & 10mL MVI         Pt on LRD- poor appetite- Marinol added & calorie count ongoing         TPN at GOAL:  Amino Acids 120g, Dextrose 210g, and Lipids 50g     -GIB -no bloody BM &  H&H remains stable         EGD/ Colonoscopy- path noted         Continuing Protonix   -Leukocytosis- improving, continue to monitor- no overt signs of infection  -HypoCa- improving w/increased Ca to 16mEq in TPN continue to monitor        Improved Ca levels helpful for BP & muscle contraction  -LowK - improving w/ 80mEq KCl in TPN, continue to monitor        maintaining K & Mg will help with GI motility   -LowMg- improving with increased Mg to 16mEq         continue to monitor as pt on Protonix   -HypoPhos- improving w/-increased NaPhos & will continue to monitor   -Fecal fat testing suggestive of decreased absorption of fat - while pt had ileostomy            will monitor for improved absorption after ileostomy reversal - (+)Loose BMs        Repeat Fat Soluble Vitamin studies        TPN w/ MVI to compensate for low Vit E & A        Vit D levels low- being supplemented w/Ergocalciferol         Vit K INR/ PT near normal suggestive that it is being absorbed   -Strict Intake and Output - continue to closely monitor  -Good glycemic control-  Fingersticks & ISS coverage -   -Weights three times a week  -Daily CMP, Mg, Ionized Ca, Phosphorus,        and Weekly Triglycerides and Pre-albumin  Continue as per Surgery, will follow with you, D/w primary team    Andreina Hubbard PA-C  TPN team, pager [phone]  D/w Dr Chacko

## 2020-02-19 NOTE — PROGRESS NOTE ADULT - ASSESSMENT
Dermatitis, resolving. Previously diagnosed as Irritant contact dermatitis 2/2 sweat. Collarettes indicated possible vesicular or pustule primary lesion vs resolving dermatitis. Difficult to determine etiology currently  -given minimal improvement with triamcinolone, switch to clobetasol 0.05% ointment twice daily x 2 weeks  -Dermatology to reassess next week to evaluate for improvement.   - would not recommend biopsy  - Patient can follow up with dermatology at [address] [place] [address], [facility]. Patient should call [phone] to schedule appointment with Dr. Woodward/Griselda on Tuesday mornings in 2 weeks.     Patient staffed with Dr. Rose    Please page [phone] with questions.    Indira Woodward MD PGY-3  [facility] Dermatology  Pager: [phone]  Office: [phone]

## 2020-02-20 ENCOUNTER — TRANSCRIPTION ENCOUNTER (OUTPATIENT)
Age: 75
End: 2020-02-20

## 2020-02-20 LAB
ALBUMIN SERPL ELPH-MCNC: 2.7 G/DL — LOW (ref 3.3–5)
ALP SERPL-CCNC: 114 U/L — SIGNIFICANT CHANGE UP (ref 40–120)
ALT FLD-CCNC: 107 U/L — HIGH (ref 10–45)
ANION GAP SERPL CALC-SCNC: 10 MMOL/L — SIGNIFICANT CHANGE UP (ref 5–17)
AST SERPL-CCNC: 31 U/L — SIGNIFICANT CHANGE UP (ref 10–40)
BILIRUB SERPL-MCNC: 0.4 MG/DL — SIGNIFICANT CHANGE UP (ref 0.2–1.2)
BUN SERPL-MCNC: 18 MG/DL — SIGNIFICANT CHANGE UP (ref 7–23)
CA-I BLD-SCNC: 1.19 MMOL/L — SIGNIFICANT CHANGE UP (ref 1.12–1.3)
CALCIUM SERPL-MCNC: 8.5 MG/DL — SIGNIFICANT CHANGE UP (ref 8.4–10.5)
CHLORIDE SERPL-SCNC: 101 MMOL/L — SIGNIFICANT CHANGE UP (ref 96–108)
CO2 SERPL-SCNC: 23 MMOL/L — SIGNIFICANT CHANGE UP (ref 22–31)
CREAT SERPL-MCNC: 0.43 MG/DL — LOW (ref 0.5–1.3)
GLUCOSE SERPL-MCNC: 96 MG/DL — SIGNIFICANT CHANGE UP (ref 70–99)
HCT VFR BLD CALC: 25.6 % — LOW (ref 39–50)
HGB BLD-MCNC: 8 G/DL — LOW (ref 13–17)
MAGNESIUM SERPL-MCNC: 2 MG/DL — SIGNIFICANT CHANGE UP (ref 1.6–2.6)
MCHC RBC-ENTMCNC: 29.3 PG — SIGNIFICANT CHANGE UP (ref 27–34)
MCHC RBC-ENTMCNC: 31.3 GM/DL — LOW (ref 32–36)
MCV RBC AUTO: 93.8 FL — SIGNIFICANT CHANGE UP (ref 80–100)
NRBC # BLD: 0 /100 WBCS — SIGNIFICANT CHANGE UP (ref 0–0)
PHOSPHATE SERPL-MCNC: 4.8 MG/DL — HIGH (ref 2.5–4.5)
PLATELET # BLD AUTO: 472 K/UL — HIGH (ref 150–400)
POTASSIUM SERPL-MCNC: 4.6 MMOL/L — SIGNIFICANT CHANGE UP (ref 3.5–5.3)
POTASSIUM SERPL-SCNC: 4.6 MMOL/L — SIGNIFICANT CHANGE UP (ref 3.5–5.3)
PREALB SERPL-MCNC: 19 MG/DL — LOW (ref 20–40)
PROT SERPL-MCNC: 6.9 G/DL — SIGNIFICANT CHANGE UP (ref 6–8.3)
RBC # BLD: 2.73 M/UL — LOW (ref 4.2–5.8)
RBC # FLD: 14.3 % — SIGNIFICANT CHANGE UP (ref 10.3–14.5)
SODIUM SERPL-SCNC: 134 MMOL/L — LOW (ref 135–145)
TRIGL SERPL-MCNC: 38 MG/DL — SIGNIFICANT CHANGE UP (ref 10–149)
WBC # BLD: 12.96 K/UL — HIGH (ref 3.8–10.5)
WBC # FLD AUTO: 12.96 K/UL — HIGH (ref 3.8–10.5)

## 2020-02-20 PROCEDURE — 99233 SBSQ HOSP IP/OBS HIGH 50: CPT

## 2020-02-20 RX ORDER — I.V. FAT EMULSION 20 G/100ML
10.4 EMULSION INTRAVENOUS
Qty: 25 | Refills: 0 | Status: DISCONTINUED | OUTPATIENT
Start: 2020-02-20 | End: 2020-02-21

## 2020-02-20 RX ORDER — ELECTROLYTE SOLUTION,INJ
1 VIAL (ML) INTRAVENOUS
Refills: 0 | Status: DISCONTINUED | OUTPATIENT
Start: 2020-02-20 | End: 2020-02-20

## 2020-02-20 RX ORDER — OXYCODONE HYDROCHLORIDE 5 MG/1
5 TABLET ORAL EVERY 4 HOURS
Refills: 0 | Status: DISCONTINUED | OUTPATIENT
Start: 2020-02-20 | End: 2020-02-27

## 2020-02-20 RX ORDER — CHLORHEXIDINE GLUCONATE 213 G/1000ML
1 SOLUTION TOPICAL
Refills: 0 | Status: DISCONTINUED | OUTPATIENT
Start: 2020-02-20 | End: 2020-02-27

## 2020-02-20 RX ADMIN — Medication 3 MILLILITER(S): at 05:23

## 2020-02-20 RX ADMIN — OXYCODONE HYDROCHLORIDE 5 MILLIGRAM(S): 5 TABLET ORAL at 23:24

## 2020-02-20 RX ADMIN — Medication 3 MILLILITER(S): at 15:49

## 2020-02-20 RX ADMIN — BUDESONIDE AND FORMOTEROL FUMARATE DIHYDRATE 2 PUFF(S): 160; 4.5 AEROSOL RESPIRATORY (INHALATION) at 18:59

## 2020-02-20 RX ADMIN — Medication 600 MILLIGRAM(S): at 05:24

## 2020-02-20 RX ADMIN — TAMSULOSIN HYDROCHLORIDE 0.4 MILLIGRAM(S): 0.4 CAPSULE ORAL at 21:33

## 2020-02-20 RX ADMIN — Medication 3 MILLILITER(S): at 19:52

## 2020-02-20 RX ADMIN — ENOXAPARIN SODIUM 40 MILLIGRAM(S): 100 INJECTION SUBCUTANEOUS at 14:18

## 2020-02-20 RX ADMIN — Medication 600 MILLIGRAM(S): at 19:00

## 2020-02-20 RX ADMIN — Medication 1 APPLICATION(S): at 19:00

## 2020-02-20 RX ADMIN — Medication 1 APPLICATION(S): at 05:24

## 2020-02-20 RX ADMIN — OXYCODONE HYDROCHLORIDE 5 MILLIGRAM(S): 5 TABLET ORAL at 00:14

## 2020-02-20 RX ADMIN — BUDESONIDE AND FORMOTEROL FUMARATE DIHYDRATE 2 PUFF(S): 160; 4.5 AEROSOL RESPIRATORY (INHALATION) at 05:24

## 2020-02-20 RX ADMIN — Medication 5 MILLIGRAM(S): at 14:26

## 2020-02-20 RX ADMIN — Medication 975 MILLIGRAM(S): at 23:25

## 2020-02-20 RX ADMIN — Medication 975 MILLIGRAM(S): at 00:14

## 2020-02-20 RX ADMIN — Medication 1 EACH: at 19:08

## 2020-02-20 RX ADMIN — PANTOPRAZOLE SODIUM 40 MILLIGRAM(S): 20 TABLET, DELAYED RELEASE ORAL at 14:19

## 2020-02-20 RX ADMIN — I.V. FAT EMULSION 10.4 ML/HR: 20 EMULSION INTRAVENOUS at 19:07

## 2020-02-20 NOTE — DISCHARGE NOTE PROVIDER - NSDCCPTREATMENT_GEN_ALL_CORE_FT
PRINCIPAL PROCEDURE  Procedure: Colectomy, partial, with mucous fistula and colostomy or ileostomy creation  Findings and Treatment: High Grade  s/p Exploratory Lap, Small Bowel Resection with Return to OR for Small Bowel obstruction, Washout, and Creation of Ileostomy  Pt then s/p Reversal of Ileostomy and Lysis of Adhesions.      SECONDARY PROCEDURE  Procedure: Evacuation, hematoma, lung, using VATS  Findings and Treatment: persistant Pleural Effusion

## 2020-02-20 NOTE — PROGRESS NOTE ADULT - ASSESSMENT
A/P: 75 year old male w/ PMH of COPD and EtOH dependence with PSH/o appy, prostate surgery, & spine surgery  septic shock and high grade SBO s/p exploratory laparotomy, lysis of adhesions, decompression of bowel via enterotomy w/ primary repair, and Abthera VAC placement on 12/6; s/p take down of Abthera, washout and re-application of the Abthera vac on 12/8. Now s/p SBR and end ileostomy on 12/10.   TPN initally consulted to assist w/ management of pt's nutrition in pt with SGS/malabsorption and had high Ileostomy output.  Pt s/p Lt Chest Pigtail for effusion in IR with persistent high output on 1/10/2020. Pt had VATs & placement of Left PleurX catheter. Post op pt developed hemothorax and Respiratory Distress.  Pt s/p Lt chest Evacuation of 2L blood w/ placement of CT x2 on 1/15/2020.  Lt Pleural Effusion resolved and pt doing well after CT removed.  Pt also noted with Rt PNA vs Pleural Effusion that has resolved.  Pt is s/p Ex Lap, MIRYAM, & Closure of End Ileostomy on 2/4/2020.      Pt w/ improving severe Protein-Calorie Malnutrition- improving PreAlbumin         On going monitoring postop after Reversal of Ileostomy in pt w/ Short Gut Syndrome                  w/Malabsorption s/p SBR  Pt tolerating TPN at 1/2 strength appetite slowly improving - tolerating LRD               improving AST/ ALT, with nL LDH, Alk Phos, CPK- continue to monitor          1/2 Strength TPN: Amino Acids 60g, Dextrose 140g, Lipids 25g in 2000mL               with 3mL MTE & 10mL MVI         Pt on LRD- poor appetite- Marinol added & calorie count ongoing         (TPN at GOAL:  Amino Acids 120g, Dextrose 210g, and Lipids 50g)     -Protonix for recent h/o GIB & Gastritis noted on EGD-                no further bloody BM & H&H remains stable         EGD/ Colonoscopy- path noted  -Leukocytosis- improving, continue to monitor- no overt signs of infection  -HypoCa- improving w/increased Ca to 16mEq in TPN continue to monitor        Improved Ca levels helpful for BP & muscle contraction  -LowK - improving w/ 80mEq KCl in TPN, continue to monitor        maintaining K & Mg will help with GI motility   -LowMg- improving with increased Mg to 16mEq         continue to monitor as pt on Protonix   -HypoPhos- improving w/-increased NaPhos & will continue to monitor   -Fecal fat testing suggestive of decreased absorption of fat - while pt had ileostomy             Semisolid & loose BM        Repeat Fat Soluble Vitamin studies        TPN w/ MVI to compensate for low Vit E & A        Vit D levels low- being supplemented w/Ergocalciferol         Vit K INR/ PT near normal suggestive that it is being absorbed   -Strict Intake and Output - continue to closely monitor  -Good glycemic control-  Fingersticks & ISS coverage -   -Weights three times a week  -Daily CMP, Mg, Ionized Ca, Phosphorus,        and Weekly Triglycerides and Pre-albumin  Continue as per Surgery, will follow with you, D/w primary team    Andreina Hubbard PA-C  TPN team, pager [phone]  D/w Dr Chacko A/P: 75 year old male w/ PMH of COPD and EtOH dependence with PSH/o appy, prostate surgery, & spine surgery  septic shock and high grade SBO s/p exploratory laparotomy, lysis of adhesions, decompression of bowel via enterotomy w/ primary repair, and Abthera VAC placement on 12/6; s/p take down of Abthera, washout and re-application of the Abthera vac on 12/8. Now s/p SBR and end ileostomy on 12/10.   TPN initally consulted to assist w/ management of pt's nutrition in pt with SGS/malabsorption and had high Ileostomy output.  Pt s/p Lt Chest Pigtail for effusion in IR with persistent high output on 1/10/2020. Pt had VATs & placement of Left PleurX catheter. Post op pt developed hemothorax and Respiratory Distress.  Pt s/p Lt chest Evacuation of 2L blood w/ placement of CT x2 on 1/15/2020.  Lt Pleural Effusion resolved and pt doing well after CT removed.  Pt also noted with Rt PNA vs Pleural Effusion that has resolved.  Pt is s/p Ex Lap, MIRYAM, & Closure of End Ileostomy on 2/4/2020.      Pt w/ improving severe Protein-Calorie Malnutrition- improving PreAlbumin         On going monitoring postop after Reversal of Ileostomy in pt w/ Short Gut Syndrome                  w/Malabsorption s/p SBR  Pt tolerating TPN at 1/2 strength appetite slowly improving - tolerating LRD               improving AST/ ALT, with nL LDH, Alk Phos, CPK- continue to monitor          1/2 Strength TPN: Amino Acids 60g, Dextrose 140g, Lipids 25g in 2000mL               with 3mL MTE & 10mL MVI         Pt on LRD- poor appetite- Marinol added & calorie count ongoing         (TPN at GOAL:  Amino Acids 120g, Dextrose 210g, and Lipids 50g)     -Protonix for recent h/o GIB & Gastritis noted on EGD-                no further bloody BM & H&H remains stable         EGD/ Colonoscopy- path noted  -Leukocytosis- improving, continue to monitor- no overt signs of infection  -HypoCa- improving w/increased Ca to 16mEq in TPN continue to monitor        Improved Ca levels helpful for BP & muscle contraction  -LowK - improving w/ 80mEq KCl in TPN, continue to monitor        maintaining K & Mg will help with GI motility   -LowMg- improving with increased Mg to 16mEq         continue to monitor as pt on Protonix   -HyperPhos/ HypoNa- decrease NaPhos & will continue to monitor   -Fecal fat testing suggestive of decreased absorption of fat - while pt had ileostomy             Semisolid & loose BM        Repeat Fat Soluble Vitamin studies        TPN w/ MVI to compensate for low Vit E & A        Vit D levels low- being supplemented w/Ergocalciferol         Vit K INR/ PT near normal suggestive that it is being absorbed   -Strict Intake and Output - continue to closely monitor  -Good glycemic control-  Fingersticks & ISS coverage -   -Weights three times a week  -Daily CMP, Mg, Ionized Ca, Phosphorus,        and Weekly Triglycerides and Pre-albumin  Continue as per Surgery, will follow with you, D/w primary team    Andreina Hubbard PA-C  TPN team, pager [phone]  D/w Dr Chacko

## 2020-02-20 NOTE — DISCHARGE NOTE PROVIDER - NSDCMRMEDTOKEN_GEN_ALL_CORE_FT
acetaminophen 325 mg oral tablet: 3 tab(s) orally every 6 hours, As needed, Mild Pain (1 - 3)  budesonide-formoterol 160 mcg-4.5 mcg/inh inhalation aerosol: 2 puff(s) inhaled 2 times a day  clobetasol 0.05% topical ointment: 1 application topically 2 times a day  dronabinol 5 mg oral capsule: 1 cap(s) orally once a day  fat emulsion with fish, medium chain, olive, and soy oil 20% intravenous emulsion:  intravenous   guaiFENesin 600 mg oral tablet, extended release: 1 tab(s) orally every 12 hours  ipratropium-albuterol 0.5 mg-2.5 mg/3 mLinhalation solution: 3 milliliter(s) inhaled every 6 hours  Lypholyte II/Nutrilyte II/TPN Electrolytes intravenous solution: 1 each intravenous   metoprolol: 12.5 milligram(s) orally every 12 hours  oxyCODONE 5 mg oral tablet: 1 tab(s) orally every 4 hours, As needed, Moderate Pain (4 - 6)  pantoprazole 40 mg oral granule, delayed release: 40 milligram(s) orally once a day  Mix with apple juice for administration via gastric tubes.  Mix with applesauce or juice for oral administration  tamsulosin 0.4 mg oral capsule: 1 cap(s) orally once a day (at bedtime)  triamcinolone 0.1% topical ointment: 1 application topically 2 times a day

## 2020-02-20 NOTE — DISCHARGE NOTE PROVIDER - NSDCFUADDAPPT_GEN_ALL_CORE_FT
Patient can follow up with dermatology at [address] [place] [address], [address]. Patient should call [phone] to schedule appointment with Dr. Woodward/Griselda on Tuesday mornings. Patient can follow up with dermatology at [address] [place] [address], [address]. Patient should call [phone] to schedule appointment with Dr. Woodward/Griselda on Tuesday mornings.    Outpt PFT's. (pulmonary function tests with your pulmonologist) Patient can follow up with dermatology at [address] [place] [address], [address]. Patient should call [phone] to schedule appointment with Dr. Woodward/Griselda on Tuesday mornings.    Outpt PFT's. (pulmonary function tests with your pulmonologist)    Follow up with Primary Care Doctor in 2weeks

## 2020-02-20 NOTE — PROGRESS NOTE ADULT - SUBJECTIVE AND OBJECTIVE BOX
Surgery Progress Note    SUBJECTIVE:  - Patient seen and examined at bedside   - Patient states feeling well, having a bit more appetite  - Denies abdominal pain, N/V, chest pain, SOB  --------------------------------------------------------------------------------------------------  OBJECTIVE:   Physical Exam:  General: AAOx3, NAD, lying comfortably in bed  HEENT: NC/AT  Respiratory: nonlabored breathing  Cardiovascular: RRR, normal S1 and S2, no murmurs or gallops  Abdomen: non-distended, soft, non-tender, dressings with minimal purulent stainings, dressings changed in am rounds  Extremities: WWP, no edema  --------------------------------------------------------------------------------------------------  V/S:  Vital Signs Last 24 Hrs  T(C): 37 (20 Feb 2020 09:07), Max: 37.4 (19 Feb 2020 16:43)  T(F): 98.6 (20 Feb 2020 09:07), Max: 99.3 (19 Feb 2020 16:43)  HR: 98 (20 Feb 2020 09:07) (90 - 106)  BP: 105/59 (20 Feb 2020 09:07) (100/63 - 114/78)  BP(mean): --  RR: 18 (20 Feb 2020 09:07) (18 - 18)  SpO2: 97% (20 Feb 2020 09:07) (94% - 97%)    --------------------------------------------------------------------------------------------------  I/Os:    19 Feb 2020 07:01  -  20 Feb 2020 07:00  --------------------------------------------------------  IN:    fat emulsion (Fish Oil and Plant Based) 20% Infusion: 135.4 mL    Oral Fluid: 920 mL    Solution: 50 mL    TPN (Total Parenteral Nutrition): 996 mL  Total IN: 2101.4 mL    OUT:    Voided: 1200 mL  Total OUT: 1200 mL    Total NET: 901.4 mL      20 Feb 2020 07:01  -  20 Feb 2020 11:03  --------------------------------------------------------  IN:    Oral Fluid: 280 mL  Total IN: 280 mL    OUT:    Voided: 275 mL  Total OUT: 275 mL    Total NET: 5 mL        --------------------------------------------------------------------------------------------------  LABS:                        8.0    12.96 )-----------( 472      ( 20 Feb 2020 05:50 )             25.6     20 Feb 2020 05:50    134    |  101    |  18     ----------------------------<  96     4.6     |  23     |  0.43     Ca    8.5        20 Feb 2020 05:50  Phos  4.8       20 Feb 2020 05:50  Mg     2.0       20 Feb 2020 05:50    TPro  6.9    /  Alb  2.7    /  TBili  0.4    /  DBili  x      /  AST  31     /  ALT  107    /  AlkPhos  114    20 Feb 2020 05:50      CAPILLARY BLOOD GLUCOSE            LIVER FUNCTIONS - ( 20 Feb 2020 05:50 )  Alb: 2.7 g/dL / Pro: 6.9 g/dL / ALK PHOS: 114 U/L / ALT: 107 U/L / AST: 31 U/L / GGT: x               --------------------------------------------------------------------------------------------------  MEDICATIONS  (STANDING):  albuterol/ipratropium for Nebulization. 3 milliLiter(s) Nebulizer every 6 hours  budesonide 160 MICROgram(s)/formoterol 4.5 MICROgram(s) Inhaler 2 Puff(s) Inhalation two times a day  chlorhexidine 2% Cloths 1 Application(s) Topical <User Schedule>  clobetasol 0.05% Ointment 1 Application(s) Topical two times a day  dronabinol 5 milliGRAM(s) Oral daily  enoxaparin Injectable 40 milliGRAM(s) SubCutaneous daily  ergocalciferol 74347 Unit(s) Oral every week  fat emulsion (Fish Oil and Plant Based) 20% Infusion 10.4 mL/Hr (10.4 mL/Hr) IV Continuous <Continuous>  guaiFENesin  milliGRAM(s) Oral every 12 hours  metoprolol tartrate 12.5 milliGRAM(s) Oral every 12 hours  pantoprazole   Suspension 40 milliGRAM(s) Oral daily  Parenteral Nutrition - Adult 1 Each (83 mL/Hr) TPN Continuous <Continuous>  tamsulosin 0.4 milliGRAM(s) Oral at bedtime  triamcinolone 0.1% Ointment 1 Application(s) Topical two times a day    MEDICATIONS  (PRN):  acetaminophen   Tablet .. 975 milliGRAM(s) Oral every 6 hours PRN Mild Pain (1 - 3)  oxyCODONE    IR 5 milliGRAM(s) Oral every 4 hours PRN Moderate Pain (4 - 6)    --------------------------------------------------------------------------------------------------

## 2020-02-20 NOTE — DISCHARGE NOTE PROVIDER - CARE PROVIDER_API CALL
Irwin Siegel)  Surgery  [address] [facility], [address]  [place] [address]  Phone: [phone]  Fax: [phone]  Follow Up Time: 2 weeks Irwin Siegel)  Surgery  [address] [facility], [address]  [place] [address]  Phone: [phone]  Fax: [phone]  Follow Up Time: 2 weeks    Ever Curry)  Critical Care Medicine  [address] [facility], [address]  [place] [address]  Phone: [phone]  Fax: [phone]  Follow Up Time: 2 weeks    Darryn Levin)  Internal Medicine; Nephrology  [address] [place] [address]  Phone: [phone]  Fax: [phone]  Follow Up Time: 2 weeks Irwin Siegel)  Surgery  [address] [facility], [address]  [place] [address]  Phone: [phone]  Fax: [phone]  Follow Up Time: 2 weeks    Ever Curry)  Critical Care Medicine  [address] [facility], [address]  [place] [address]  Phone: [phone]  Fax: [phone]  Follow Up Time: 2 weeks    Darryn Levin)  Internal Medicine; Nephrology  [address] [place] [address]  Phone: [phone]  Fax: [phone]  Follow Up Time: 2 weeks    Marek Rosas ()  Cardiology; Internal Medicine  [address] [facility], [address]  [place] [address]  Phone: [phone]  Fax: [phone]  Follow Up Time: 2 weeks    Keny Marsh)  Gastroenterology; Internal Medicine  [address] [place] [address]  Phone: [phone]  Fax: [phone]  Follow Up Time: 2 weeks

## 2020-02-20 NOTE — PROGRESS NOTE ADULT - PROBLEM SELECTOR PLAN 1
likely 2nd to deconditioning  -CXR grossly clear  -No hypoxia with ambulation, sats maintained >92%  -LE duplex neg DVT 2/14  -Incentive spirometer use encouraged

## 2020-02-20 NOTE — PROGRESS NOTE ADULT - SUBJECTIVE AND OBJECTIVE BOX
[facility] NUTRITION SUPPORT / TPN -- FOLLOW UP NOTE  --------------------------------------------------------------------------------    24 hour events/subjective:  (+)flatus  (+)semiformed BM x2  Waiting for breakfast- +/- appetite better         feeling less like he's forcing himself to eat 1/2 of his meals         Marinol was started        calorie count initiated  Pt on Protonix suspension QD  improving abdominal Pain - relief w/ pain Rx  OOB ad rachel- likes to walk around the unit in the afternoon  No n/v  No cough/ cp/ palp/dyspnea/ sob  No f/c/s        Diet:  Diet, Low Fiber:   Supplement Feeding Modality:  Oral  Ensure Clear Cans or Servings Per Day:  1       Frequency:  Three Times a day  Ensure Enlive Cans or Servings Per Day:  1       Frequency:  Three Times a day (02-19-20 @ 10:36)      Appetite: [  x]Poor [  ]Adequate [  ]Good  Caloric intake:  [   x]  Adequate   [   ] Inadequate    ROS: General/ GI see HPI  all other systems negative      ALLERGIES & MEDICATIONS  --------------------------------------------------------------------------------  ALLERGIES  IV Contrast (Hives)    STANDING INPATIENT MEDICATIONS    albuterol/ipratropium for Nebulization. 3 milliLiter(s) Nebulizer every 6 hours  budesonide 160 MICROgram(s)/formoterol 4.5 MICROgram(s) Inhaler 2 Puff(s) Inhalation two times a day  chlorhexidine 2% Cloths 1 Application(s) Topical <User Schedule>  clobetasol 0.05% Ointment 1 Application(s) Topical two times a day  dronabinol 5 milliGRAM(s) Oral daily  enoxaparin Injectable 40 milliGRAM(s) SubCutaneous daily  ergocalciferol 44418 Unit(s) Oral every week  fat emulsion (Fish Oil and Plant Based) 20% Infusion 10.4 mL/Hr IV Continuous <Continuous>  guaiFENesin  milliGRAM(s) Oral every 12 hours  metoprolol tartrate 12.5 milliGRAM(s) Oral every 12 hours  pantoprazole   Suspension 40 milliGRAM(s) Oral daily  Parenteral Nutrition - Adult 1 Each TPN Continuous <Continuous>  tamsulosin 0.4 milliGRAM(s) Oral at bedtime  triamcinolone 0.1% Ointment 1 Application(s) Topical two times a day      PRN INPATIENT MEDICATION  acetaminophen   Tablet .. 975 milliGRAM(s) Oral every 6 hours PRN  oxyCODONE    IR 5 milliGRAM(s) Oral every 4 hours PRN        VITALS/PHYSICAL EXAM  --------------------------------------------------------------------------------  T(C): 37 (02-20-20 @ 09:07), Max: 37.4 (02-19-20 @ 16:43)  HR: 98 (02-20-20 @ 09:07) (90 - 106)  BP: 105/59 (02-20-20 @ 09:07) (100/63 - 114/78)  RR: 18 (02-20-20 @ 09:07) (18 - 18)  SpO2: 97% (02-20-20 @ 09:07) (94% - 97%)  Wt(kg): --        02-19-20 @ 07:01  -  02-20-20 @ 07:00  --------------------------------------------------------  IN: 2101.4 mL / OUT: 1200 mL / NET: 901.4 mL    02-20-20 @ 07:01  -  02-20-20 @ 10:29  --------------------------------------------------------  IN: 280 mL / OUT: 275 mL / NET: 5 mL    PHYSICAL EXAM:  --------------------------------------------------------------------------------  	Gen: guarded but stable, A&Ox3  	HEENT: NC/AT, PERRL, supple neck, trachea midline, mucosa moist              GI: (+) BS, nondistended, nontender                    midline & Rt sided ostomy dressing c/d/i              MSK: FROM x4, no contractures nor deformaties                   LLE w/ brace   	Vascular: Equally Warm,  no edema, no clubbing, cyanosis,                      RUE PICC w/o sx infection  	Neuro: No focal deficits, grossly intact sensation, & strength   	Psych: Normal affect and mood              skin: moist, good turgor, erythematous pinpoint rash chest/ back        LABS/ CULTURES/ RADIOLOGY:              8.0    12.96 >-----------<  472      [02-20-20 @ 05:50]              25.6     134  |  101  |  18  ----------------------------<  96      [02-20-20 @ 05:50]  4.6   |  23  |  0.43        Ca     8.5     [02-20-20 @ 05:50]      iCa    1.19     [02-20 @ 05:58]      Mg     2.0     [02-20-20 @ 05:50]      Phos  4.8     [02-20-20 @ 05:50]    TPro  6.9  /  Alb  2.7  /  TBili  0.4  /  DBili  x   /  AST  31  /  ALT  107  /  AlkPhos  114  [02-20-20 @ 05:50]    Prealbumin, Serum: 19 mg/dL (02-20-20 @ 09:06)  Prealbumin, Serum: 16 mg/dL (02-15-20 @ 12:30)  Prealbumin, Serum: 17 mg/dL (02-10-20 @ 09:15)  Prealbumin, Serum: 21 mg/dL (02-03-20 @ 23:50)  Prealbumin, Serum: 19 mg/dL (02-03-20 @ 07:18)

## 2020-02-20 NOTE — DISCHARGE NOTE PROVIDER - PROVIDER TOKENS
PROVIDER:[TOKEN:[[id number]],FOLLOWUP:[2 weeks]] PROVIDER:[TOKEN:[[id number]],FOLLOWUP:[2 weeks]],PROVIDER:[TOKEN:[[id number]],FOLLOWUP:[2 weeks]],PROVIDER:[TOKEN:[[id number]],FOLLOWUP:[2 weeks]] PROVIDER:[TOKEN:[[id number]],FOLLOWUP:[2 weeks]],PROVIDER:[TOKEN:[[id number]],FOLLOWUP:[2 weeks]],PROVIDER:[TOKEN:[[id number]],FOLLOWUP:[2 weeks]],PROVIDER:[TOKEN:[[id number]],FOLLOWUP:[2 weeks]],PROVIDER:[TOKEN:[[id number]],FOLLOWUP:[2 weeks]]

## 2020-02-20 NOTE — DISCHARGE NOTE PROVIDER - NSDCCPCAREPLAN_GEN_ALL_CORE_FT
PRINCIPAL DISCHARGE DIAGNOSIS  Diagnosis: SBO (small bowel obstruction)  Assessment and Plan of Treatment: Resolved      SECONDARY DISCHARGE DIAGNOSES  Diagnosis: Severe malnutrition  Assessment and Plan of Treatment: on going requiring TPN    Diagnosis: Large pleural effusion  Assessment and Plan of Treatment: resolved    Diagnosis: Small bowel obstruction  Assessment and Plan of Treatment: High Grade

## 2020-02-20 NOTE — PROGRESS NOTE ADULT - SUBJECTIVE AND OBJECTIVE BOX
Subjective: Patient seen and examined. No new events except as noted.     REVIEW OF SYSTEMS:    CONSTITUTIONAL: No weakness, fevers or chills  EYES/ENT: No visual changes;  No vertigo or throat pain   NECK: No pain or stiffness  RESPIRATORY: No cough, wheezing, hemoptysis; No shortness of breath  CARDIOVASCULAR: No chest pain or palpitations  GASTROINTESTINAL: No abdominal or epigastric pain. No nausea, vomiting, or hematemesis; No diarrhea or constipation. No melena or hematochezia.  GENITOURINARY: No dysuria, frequency or hematuria  NEUROLOGICAL: No numbness or weakness  SKIN: No itching, burning, rashes, or lesions   All other review of systems is negative unless indicated above.    MEDICATIONS:  MEDICATIONS  (STANDING):  albuterol/ipratropium for Nebulization. 3 milliLiter(s) Nebulizer every 6 hours  budesonide 160 MICROgram(s)/formoterol 4.5 MICROgram(s) Inhaler 2 Puff(s) Inhalation two times a day  chlorhexidine 2% Cloths 1 Application(s) Topical <User Schedule>  chlorhexidine 4% Liquid 1 Application(s) Topical <User Schedule>  clobetasol 0.05% Ointment 1 Application(s) Topical two times a day  dronabinol 5 milliGRAM(s) Oral daily  enoxaparin Injectable 40 milliGRAM(s) SubCutaneous daily  ergocalciferol 13278 Unit(s) Oral every week  fat emulsion (Fish Oil and Plant Based) 20% Infusion 10.4 mL/Hr (10.4 mL/Hr) IV Continuous <Continuous>  guaiFENesin  milliGRAM(s) Oral every 12 hours  metoprolol tartrate 12.5 milliGRAM(s) Oral every 12 hours  pantoprazole   Suspension 40 milliGRAM(s) Oral daily  Parenteral Nutrition - Adult 1 Each (83 mL/Hr) TPN Continuous <Continuous>  tamsulosin 0.4 milliGRAM(s) Oral at bedtime  triamcinolone 0.1% Ointment 1 Application(s) Topical two times a day      PHYSICAL EXAM:  T(C): 36.8 (02-20-20 @ 19:00), Max: 37.3 (02-19-20 @ 21:09)  HR: 96 (02-20-20 @ 19:00) (90 - 100)  BP: 108/61 (02-20-20 @ 19:00) (100/63 - 108/62)  RR: 20 (02-20-20 @ 19:00) (18 - 20)  SpO2: 96% (02-20-20 @ 19:00) (95% - 97%)  Wt(kg): --  I&O's Summary    19 Feb 2020 07:01  -  20 Feb 2020 07:00  --------------------------------------------------------  IN: 2101.4 mL / OUT: 1200 mL / NET: 901.4 mL    20 Feb 2020 07:01  -  20 Feb 2020 20:39  --------------------------------------------------------  IN: 1736 mL / OUT: 975 mL / NET: 761 mL          Appearance: Normal	  HEENT:   Normal oral mucosa, PERRL, EOMI	  Lymphatic: No lymphadenopathy , no edema  Cardiovascular: Normal S1 S2, No JVD, No murmurs , Peripheral pulses palpable 2+ bilaterally  Respiratory: Lungs clear to auscultation, normal effort 	  Gastrointestinal:  Soft, Non-tender, + BS	  Skin: No rashes, No ecchymoses, No cyanosis, warm to touch  Musculoskeletal: Normal range of motion, normal strength  Psychiatry:  Mood & affect appropriate  Ext: No edema      LABS:    CARDIAC MARKERS:  CARDIAC MARKERS ( 18 Feb 2020 05:49 )  x     / x     / 38 U/L / x     / x                                    8.0    12.96 )-----------( 472      ( 20 Feb 2020 05:50 )             25.6     02-20    134<L>  |  101  |  18  ----------------------------<  96  4.6   |  23  |  0.43<L>    Ca    8.5      20 Feb 2020 05:50  Phos  4.8     02-20  Mg     2.0     02-20    TPro  6.9  /  Alb  2.7<L>  /  TBili  0.4  /  DBili  x   /  AST  31  /  ALT  107<H>  /  AlkPhos  114  02-20    proBNP:   Lipid Profile:   HgA1c:   TSH:             TELEMETRY: 	    ECG:  	  RADIOLOGY:   DIAGNOSTIC TESTING:  [ ] Echocardiogram:  [ ]  Catheterization:  [ ] Stress Test:    OTHER:

## 2020-02-20 NOTE — CHART NOTE - NSCHARTNOTEFT_GEN_A_CORE
Nutrition Follow Up Note  Patient seen for: Calorie count follow up / TPN follow up    Pt seen by RD s/p completion of calorie count. Per discussion with pt this AM, reports feeling better than he had within last few days. Believes appetite is improving back towards baseline. Calorie count results (-) shown below. Pt continues to receive half strength TPN (see below). Continues on Marinol for appetite stimulation. Pt s/p EGD/colonoscopy with no obvious signs of bleeding noted.    Source: RN, comprehensive chart review, patient, TPN rounds    Diet: Low Fiber, Ensure Clear 1x three times daily, Ensure Enlive 1x three times daily     This RD spoke with pt to obtain updated dietary preferences. Noted to prefer chocolate Ensure Enlive, apple Ensure Clear. Additionally, pt reports to enjoying chocolate pudding and chocolate Mighty Shakes in which RD updated menu file to reflect preferences. Pt endorsed utilizing daily menus (in room dining on [employer]) and expressed satisfaction in quality of meals provided. Denies abdominal pain, nausea, vomiting at this time. See below for stool output.     Calorie Count Results (estimated):   Day 1: ~666kcal and 24.75g protein (questionable accuracy, as calorie count may have been incompletely filled out)  Day 2: ~1479kcal and 63.3g protein  Day 3: ~1426kcal and 52g protein    3 Day Average: 1190kcal and 46.7g protein. Based on estimated energy requirements (see below), pt consumed ~88% of low end energy needs and ~48% of low end protein needs (increased needs with consideration of TPN). To note, questionable accuracy, as calorie count may have been incompletely filled out on Day 1. Will continue to monitor.     Estimated Energy Requirements:   4259-6576 gregorio/day (25-30cal/Kg)   Gm protein/day (1.8-2.2Gm/Kg)     Stool count:   (): x 1  (): x 1  (): x 2    Pt currently receiving half strength TPN: 2000ml running at 83ml/hr. 60g AA, 140g dextrose and 25g SMOF lipids. Providincal and 60g protein. Additionally, TPN contains 80mEq of NaCl, 60mMoles of NaPhos and 80mEq of KCl, 16mEq of Ca Gluconates and 16mEq of Mg Sulfate.    Daily Weight in k.6 (02-19), Weight in k.8 (02-14)  % Weight Change    Pertinent Medications: MEDICATIONS  (STANDING):  albuterol/ipratropium for Nebulization. 3 milliLiter(s) Nebulizer every 6 hours  budesonide 160 MICROgram(s)/formoterol 4.5 MICROgram(s) Inhaler 2 Puff(s) Inhalation two times a day  chlorhexidine 2% Cloths 1 Application(s) Topical <User Schedule>  clobetasol 0.05% Ointment 1 Application(s) Topical two times a day  dronabinol 5 milliGRAM(s) Oral daily  enoxaparin Injectable 40 milliGRAM(s) SubCutaneous daily  ergocalciferol 95704 Unit(s) Oral every week  fat emulsion (Fish Oil and Plant Based) 20% Infusion 10.4 mL/Hr (10.4 mL/Hr) IV Continuous <Continuous>  guaiFENesin  milliGRAM(s) Oral every 12 hours  metoprolol tartrate 12.5 milliGRAM(s) Oral every 12 hours  pantoprazole   Suspension 40 milliGRAM(s) Oral daily  Parenteral Nutrition - Adult 1 Each (83 mL/Hr) TPN Continuous <Continuous>  tamsulosin 0.4 milliGRAM(s) Oral at bedtime  triamcinolone 0.1% Ointment 1 Application(s) Topical two times a day    MEDICATIONS  (PRN):  acetaminophen   Tablet .. 975 milliGRAM(s) Oral every 6 hours PRN Mild Pain (1 - 3)  oxyCODONE    IR 5 milliGRAM(s) Oral every 4 hours PRN Moderate Pain (4 - 6)    Pertinent Labs:  @ 05:50: Na 134<L>, BUN 18, Cr 0.43<L>, BG 96, K+ 4.6, Phos 4.8<H>, Mg 2.0, Alk Phos 114, ALT/SGPT 107<H>, AST/SGOT 31, HbA1c --    Finger Sticks:    Skin per nursing documentation: surgical incision midline abdomen,   Edema: none noted    Estimated Needs:   [x] no change since previous assessment    Estimated Energy Requirements:   6296-2814 gregorio/day (25-30cal/Kg)   Gm protein/day (1.8-2.2Gm/Kg)     Previous Nutrition Diagnosis: Severe protein calorie malnutrition  Nutrition Diagnosis is: Remains appropriate, ongoing with EN     Interventions:     Recommend  1) Continue low fiber diet with prescribed supplements (Ensure Enlive 1x three times daily, Ensure Clear 1x three times daily) as ordered. Defer consistency to medical team, SLP.   2) Monitor and encourage PO intake. [place] dietary preferences as expressed. Menu file continues to be updated to reflect personal preferences.   3) Continue Mighty Shakes 1x three times daily.   4) TPN as per TPN team, nutrition assessment.   5) Monitor GI function. Bowel regimen as per medical team.       Monitoring and Evaluation:     Continue to monitor Nutritional intake, Tolerance to diet prescription, weights, labs, skin integrity    RD remains available upon request and will follow up per protocol Nutrition Follow Up Note  Patient seen for: Calorie count follow up / TPN follow up    Pt seen by RD s/p completion of calorie count. Per discussion with pt this AM, reports feeling better than he had within last few days. Believes appetite is improving back towards baseline. Calorie count results (-) shown below. Pt continues to receive half strength TPN (see below). Continues on Marinol for appetite stimulation. Pt s/p EGD/colonoscopy with no obvious signs of bleeding noted.    Source: RN, comprehensive chart review, patient, TPN rounds    Diet: Low Fiber, Ensure Clear 1x three times daily, Ensure Enlive 1x three times daily     This RD spoke with pt to obtain updated dietary preferences. Noted to prefer chocolate Ensure Enlive, apple Ensure Clear. Additionally, pt reports to enjoying chocolate pudding and chocolate Mighty Shakes in which RD updated menu file to reflect preferences. Pt endorsed utilizing daily menus (in room dining on [employer]) and expressed satisfaction in quality of meals provided. Denies abdominal pain, nausea, vomiting at this time. See below for stool output.     Calorie Count Results (estimated):   Day 1: ~666kcal and 24.75g protein (questionable accuracy, as calorie count may have been incompletely filled out)  Day 2: ~1479kcal and 63.3g protein  Day 3: ~1426kcal and 52g protein    3 Day Average: 1190kcal and 46.7g protein. Based on estimated energy requirements (see below), pt consumed ~88% of low end energy needs and ~48% of low end protein needs (increased needs with consideration of TPN). To note, questionable accuracy, as calorie count may have been incompletely filled out on Day 1. Will continue to monitor.     Estimated Energy Requirements:   5605-3127 gregorio/day (25-30cal/Kg)   Gm protein/day (1.8-2.2Gm/Kg)     Stool count:   (): x 1  (): x 1  (): x 2    Pt currently receiving half strength TPN: 2000ml running at 83ml/hr. 60g AA, 140g dextrose and 25g SMOF lipids. Providincal and 60g protein. Additionally, TPN contains 80mEq of NaCl, 60mMoles of NaPhos and 80mEq of KCl, 16mEq of Ca Gluconates and 16mEq of Mg Sulfate. Based on review of lab results, () Phos 4.8; appears to be rising.     Daily Weight in k.6 (-19), Weight in k.8 (-14)  % Weight Change    Pertinent Medications: MEDICATIONS  (STANDING):  albuterol/ipratropium for Nebulization. 3 milliLiter(s) Nebulizer every 6 hours  budesonide 160 MICROgram(s)/formoterol 4.5 MICROgram(s) Inhaler 2 Puff(s) Inhalation two times a day  chlorhexidine 2% Cloths 1 Application(s) Topical <User Schedule>  clobetasol 0.05% Ointment 1 Application(s) Topical two times a day  dronabinol 5 milliGRAM(s) Oral daily  enoxaparin Injectable 40 milliGRAM(s) SubCutaneous daily  ergocalciferol 32645 Unit(s) Oral every week  fat emulsion (Fish Oil and Plant Based) 20% Infusion 10.4 mL/Hr (10.4 mL/Hr) IV Continuous <Continuous>  guaiFENesin  milliGRAM(s) Oral every 12 hours  metoprolol tartrate 12.5 milliGRAM(s) Oral every 12 hours  pantoprazole   Suspension 40 milliGRAM(s) Oral daily  Parenteral Nutrition - Adult 1 Each (83 mL/Hr) TPN Continuous <Continuous>  tamsulosin 0.4 milliGRAM(s) Oral at bedtime  triamcinolone 0.1% Ointment 1 Application(s) Topical two times a day    MEDICATIONS  (PRN):  acetaminophen   Tablet .. 975 milliGRAM(s) Oral every 6 hours PRN Mild Pain (1 - 3)  oxyCODONE    IR 5 milliGRAM(s) Oral every 4 hours PRN Moderate Pain (4 - 6)    Pertinent Labs:  @ 05:50: Na 134<L>, BUN 18, Cr 0.43<L>, BG 96, K+ 4.6, Phos 4.8<H>, Mg 2.0, Alk Phos 114, ALT/SGPT 107<H>, AST/SGOT 31, HbA1c --    Finger Sticks:    Skin per nursing documentation: surgical incision midline abdomen,   Edema: none noted    Estimated Needs:   [x] no change since previous assessment    Estimated Energy Requirements:   1409-4316 gregorio/day (25-30cal/Kg)   Gm protein/day (1.8-2.2Gm/Kg)     Previous Nutrition Diagnosis: Severe protein calorie malnutrition  Nutrition Diagnosis is: Remains appropriate, ongoing with EN     Interventions:     Recommend  1) Continue low fiber diet with prescribed supplements (Ensure Enlive 1x three times daily, Ensure Clear 1x three times daily) as ordered. Defer consistency to medical team, SLP.   2) Monitor and encourage PO intake. [place] dietary preferences as expressed. Menu file continues to be updated to reflect personal preferences.   3) Continue Mighty Shakes 1x three times daily.   4) TPN as per TPN team, nutrition assessment.   5) Monitor GI function. Bowel regimen as per medical team.       Monitoring and Evaluation:     Continue to monitor Nutritional intake, Tolerance to diet prescription, weights, labs, skin integrity    RD remains available upon request and will follow up per protocol

## 2020-02-20 NOTE — PROGRESS NOTE ADULT - ASSESSMENT
76 y/o M presenting with septic shock and high grade SBO s/p exploratory laparotomy, lysis of adhesions, decompression of bowel via enterotomy w/ primary repair, and Abthera VAC placement on 12/6; s/p take down of Abthera, washout and re-application of the Abthera vac on 12/8. 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 s/p L chest tube placement by IR 12/30 for pleural effusion now s/p VATS, with chest tube insertion for drainage of pleural effusion. RRT called 1/13 AM for hypoxia, patient transferred back to SICU.  Patient continued SOB, chest drain possible obstruction expanding. Patent taken back to OR emergently 1/15 for clot evac and VATS. Patient is now s/p Ileostomy reversal 2/4/20. Post-op c/b bloody BM and anemia requiring blood transfusion, now improved. Now tolerating regular diet.    PLAN:  - C/w Marinol for appetite stimulation  - Continue regular diet  - Calorie count, wean TPN  - BID dressing changes (minimal gauze and tape)  - Monitor BM frequency and appearance  - Pain control as needed  - OOB with PT  - Dc planning for rehab  - Appreciate cv/pulm f/u   - Appreciate Dermatology eval/recs: Clobetasol 0.05% BID for 2 weeks. Outpatient follow up in 2 weeks on Tuesday mornings ([phone])    [facility]  [id number]

## 2020-02-20 NOTE — PROGRESS NOTE ADULT - SUBJECTIVE AND OBJECTIVE BOX
Follow-up Pulm Progress Note    C/o unchanged GUTIERREZ  Denies SOB at rest  Sats 98% RA    Medications:  MEDICATIONS  (STANDING):  albuterol/ipratropium for Nebulization. 3 milliLiter(s) Nebulizer every 6 hours  budesonide 160 MICROgram(s)/formoterol 4.5 MICROgram(s) Inhaler 2 Puff(s) Inhalation two times a day  chlorhexidine 2% Cloths 1 Application(s) Topical <User Schedule>  clobetasol 0.05% Ointment 1 Application(s) Topical two times a day  dronabinol 5 milliGRAM(s) Oral daily  enoxaparin Injectable 40 milliGRAM(s) SubCutaneous daily  ergocalciferol 27296 Unit(s) Oral every week  fat emulsion (Fish Oil and Plant Based) 20% Infusion 10.4 mL/Hr (10.4 mL/Hr) IV Continuous <Continuous>  guaiFENesin  milliGRAM(s) Oral every 12 hours  metoprolol tartrate 12.5 milliGRAM(s) Oral every 12 hours  pantoprazole   Suspension 40 milliGRAM(s) Oral daily  Parenteral Nutrition - Adult 1 Each (83 mL/Hr) TPN Continuous <Continuous>  Parenteral Nutrition - Adult 1 Each (83 mL/Hr) TPN Continuous <Continuous>  tamsulosin 0.4 milliGRAM(s) Oral at bedtime  triamcinolone 0.1% Ointment 1 Application(s) Topical two times a day    MEDICATIONS  (PRN):  acetaminophen   Tablet .. 975 milliGRAM(s) Oral every 6 hours PRN Mild Pain (1 - 3)  oxyCODONE    IR 5 milliGRAM(s) Oral every 4 hours PRN Moderate Pain (4 - 6)          Vital Signs Last 24 Hrs  T(C): 37 (20 Feb 2020 09:07), Max: 37.4 (19 Feb 2020 16:43)  T(F): 98.6 (20 Feb 2020 09:07), Max: 99.3 (19 Feb 2020 16:43)  HR: 98 (20 Feb 2020 09:07) (90 - 106)  BP: 105/59 (20 Feb 2020 09:07) (100/63 - 114/78)  BP(mean): --  RR: 18 (20 Feb 2020 09:07) (18 - 18)  SpO2: 97% (20 Feb 2020 09:07) (94% - 97%)          02-19 @ 07:01  -  02-20 @ 07:00  --------------------------------------------------------  IN: 2101.4 mL / OUT: 1200 mL / NET: 901.4 mL          LABS:                        8.0    12.96 )-----------( 472      ( 20 Feb 2020 05:50 )             25.6     02-20    134<L>  |  101  |  18  ----------------------------<  96  4.6   |  23  |  0.43<L>    Ca    8.5      20 Feb 2020 05:50  Phos  4.8     02-20  Mg     2.0     02-20    TPro  6.9  /  Alb  2.7<L>  /  TBili  0.4  /  DBili  x   /  AST  31  /  ALT  107<H>  /  AlkPhos  114  02-20            Physical Examination:  PULM: diminished BS throughout   CVS: RRR    RADIOLOGY REVIEWED  CXR: grossly clear

## 2020-02-20 NOTE — DISCHARGE NOTE PROVIDER - CARE PROVIDERS DIRECT ADDRESSES
,[email or web address],DirectAddress_Unknown,[email or web address] ,[email or web address],DirectAddress_Unknown,DirectAddress_Unknown,DirectAddress_Unknown,[email or web address]

## 2020-02-20 NOTE — DISCHARGE NOTE PROVIDER - HOSPITAL COURSE
75 y/o with PMhx of COPD and ETOH abuse presenting to the ED on 12/6/19 with abdominal pain x2 days. Reports that pain felt more like gas pain, and had similar episodes in the past. Felt nauseous and couldn't vomit. Patient forced himself to vomit had hematemesis. Unable to tolerate PO in last day. Reports unable to pass gas or have BM in 3 days. Patient reports that he does not follow up with a PMD regularly, and can't recall last time he saw doctor. Reports he had colonoscopy recently with no significant findings. Last drink was 3 years ago. Reports some difficulty breathing. Denies fevers, chills, chest pain, or urinary changes. Pt had a CT of the abdomen which showed a high-grade small bowel obstruction with transition point in the right     lower quadrant. An NGT was placed in the ED and patient was made NPO for the OR. Pt went to the OR for a  exploratory laparotomy and lysis of adhesions. An abthera vac was placed.  SICU was consulted for hemodynamic monitoring. Pt was admitted to SICU after he was out of the OR. Pt remains intubated, was put on DVT prophylaxis, antibiotics and CIWA protocol.     ON 12/7: pressors were continued overnight, requring levo and vaso. precedex for sedation, WBC improved, hyperkalemic after repletion however improved during morning, lactate improved at 3, s/p 500cc albumin overnight. Pt was prepped for OR for tomorrow 12/8 for washout.     On 12/8: pressor requirements labile overnight, tachycardic to 120s-140s overnight. Towards end of night went down to 80s, cardiac workup sent which showed trop of 48, received total of 1.5L normal saline during day, 250cc 5% albumin overnight and 1L NS overnight, lactate cleared, most recently 2, and Cr improving. Cardiology was consulted for cardiac management.  Pt went back to the OR for an abdominal washout and still residual ischemic bowel so decided not to close, a remaining 165 cm of viable bowel, abthera vac was replaced.    On 12/9: Pt was given 2x FFP and vitamin K 10, repeat INR 1.53 and 1.11, continued Zosyn for 7 day course for bowel ischemia, 500 IVF, UOP 50, EBL 5, on alvaro and vaso, Off Alvaro since 10 PM. Pt was seen by dietician. Occupational therapy was consulted and recommended ARLENE on discharge. Pt was pre-oped for ex lap wound washout possible ostomy creation possible abthera placement possible abd closure for tomorrow 12/10.    On 12/10:  IV fluids changed to plasmalyte, running at 125. Pt went back to the OR for a partial colectomy with mucous fistula and colostomy or ileostomy creation, end ileostomy, abdominal washout and exploratory laparotomy. JOVANNA drain was placed. EBL was 50ml, given 1L crystalloid intraop, 150 cc urine output.     On 12/11: A line from right radial artery removed, A line place in left radial artery. Right IJ Triple lumen accidentally pulled out partially in OR, CXR obtained and is out 2 cm, but still functional. Not replaced 75 y/o with PMhx of COPD and ETOH abuse presenting to the ED on 12/6/19 with abdominal pain x2 days. Reports that pain felt more like gas pain, and had similar episodes in the past. Felt nauseous and couldn't vomit. Patient forced himself to vomit had hematemesis. Unable to tolerate PO in last day. Reports unable to pass gas or have BM in 3 days. Patient reports that he does not follow up with a PMD regularly, and can't recall last time he saw doctor. Reports he had colonoscopy recently with no significant findings. Last drink was 3 years ago. Reports some difficulty breathing. Denies fevers, chills, chest pain, or urinary changes. Pt had a CT of the abdomen which showed a high-grade small bowel obstruction with transition point in the right     lower quadrant. An NGT was placed in the ED and patient was made NPO for the OR. Pt went to the OR for a  exploratory laparotomy and lysis of adhesions. An abthera vac was placed.  SICU was consulted for hemodynamic monitoring. Pt was admitted to SICU after he was out of the OR. Pt remains intubated, was put on DVT prophylaxis, antibiotics and CIWA protocol.     ON 12/7: pressors were continued overnight, requring levo and vaso. precedex for sedation, WBC improved, hyperkalemic after repletion however improved during morning, lactate improved at 3, s/p 500cc albumin overnight. Pt was prepped for OR for tomorrow 12/8 for washout.     On 12/8: pressor requirements labile overnight, tachycardic to 120s-140s overnight. Towards end of night went down to 80s, cardiac workup sent which showed trop of 48, received total of 1.5L normal saline during day, 250cc 5% albumin overnight and 1L NS overnight, lactate cleared, most recently 2, and Cr improving. Cardiology was consulted for cardiac management.  Pt went back to the OR for an abdominal washout and still residual ischemic bowel so decided not to close, a remaining 165 cm of viable bowel, abthera vac was replaced.    On 12/9: Pt was given 2x FFP and vitamin K 10, repeat INR 1.53 and 1.11, continued Zosyn for 7 day course for bowel ischemia, 500 IVF, UOP 50, EBL 5, on alvaro and vaso, Off Alvaro since 10 PM. Pt was seen by dietician. Occupational therapy was consulted and recommended ARLENE on discharge. Pt was pre-oped for ex lap wound washout possible ostomy creation possible abthera placement possible abd closure for tomorrow 12/10.    On 12/10:  IV fluids changed to plasmalyte, running at 125. Pt went back to the OR for a partial colectomy with mucous fistula and colostomy or ileostomy creation, end ileostomy, abdominal washout and exploratory laparotomy. JOVANNA drain was placed. EBL was 50ml, given 1L crystalloid intraop, 150 cc urine output.     On 12/11: A line from right radial artery removed, A line place in left radial artery. Right IJ Triple lumen accidentally pulled out partially in OR, CXR obtained and is out 2 cm, but still functional. Not replaced. Pt was extubated at 4pm, Acidotic after extubation, placed on Bipap 12/5 initially without much improvement, increased to 15/5, started on Duonebs and Pulmicort, tachycardic overnight. Found to be in SVT on EKG. Controlled with lopressor push x1.     On 12/12: Dietician rec pending tolerance to clear liquids, advance to Low Fiber diet, when diet is advanced, add 3 servings Ensure Enlive (350cal, 20Gm protein per 8oz serving), If EN is warranted, initiate Vital1.2 at 10ml/hr, increase as tolerated to goal rate 50ml/hour x 24 hours to provide 1200ml formula, 1440cal/day, 90Gm protein/day, 973ml free water; meets 27cal/Kg and 1.7Gm protein/Kg per dosing wt 54.2Kg.    On 12/13:discontinued rosas, passed trial of void, diuresed w/ 20 of lasix, pt pulled out NGT in the AM, reinserted in PM w/ immediate output of about 600cc, persistently tachycardic to 100~110's, metoprolol increased from 5mg q8 to q6    On 12/14: Central line was DC'ed, now has two peripheral IVs, PICC line to be placed tomorrow for TPN , Got Diamox x1 during the day    On 12/15:  Adv to [facility]. 20 Lasix x 1. In resp distress in afternoon, given additional 20mg IV lasix, started on Bipap, given 20mg Solumedrol for possible COPD exacerbation.  ABG obtained which showed respiratory acidosis, IPAP inc from 10 to 14. Hypotensive requiring phenylephrine. CXR showed moderate left-sided PTX. L pigtail was placed. New onset A.fib, started on Alvaro. Physical therapy was consulted and recommended ARLENE    12/16: sig improvement after pigtail. off bipap for most of evening, now on rec NC. Holding diuersis. Diet advanced to clears and later regular diet in the evening. Continuing TPN overlap for another day. Elevated WBC likely reactive.     12/17: Monitor ileostomy output, could need lomotil. f/u am CXR    12/28: Hold BB.    1/13: returned to SICU after RRT. +20 lasix, pigtail placed to suction and 750mL outpu    1/20: calorie count. PICC exchange. acapella. OOB to chair.     1/21: PICC exchanged by IR. 1/2 strength TPN restarted.    1/22: L base chest tube d/c'ed.     1/24: DDAVP 2 mcg IV x1 for polyuria.    1/25; DDAVP 2mcg SQ x 2. check ADH and am cortisol level. Thoracic to D/C other chest tube. Bolused 500mL x 1 for hypotension. Overnight bolused additional 500mL, trops checked and BCx sent. H/H dropped to 6.7/21.4 and transfused 1 unit PRBC, repeat 7.4/23.4    1/26: D/C rosas. cosyntropin stim test. check TSH, Free T4. f/u blood cultures    1/27: Continue pain control w/ Tylenol, c/w budesonide, wean off duonebs, c/w bipap at night    1/28: DDAVP 2mcg x 1. +1L plasmalyte bolus    1/31: DDAVP 4mcg, f/u cosyntropin study, confirm weight, discuss w/ Dr. Plascencia increasing TPN    2/1: CXR ordered for Monday morning, monitor UOP, if increases consider DDAVP     2/2: bolused 1L NS. CBC repeated     2/4:R sided end ileostomy and mucous fistula taken down, dermatology f/u for rash     2/10: large melena, pending GI for ?EGD, npo after midnight    2/11: EGD held today, tomorrow  Colonoscopy+EGD, npo after midnight 73 y/o with PMhx of COPD and ETOH abuse presented to the ED on 12/6/19 with abdominal pain x2 days. He reported that pain felt more like gas pain, and he had similar episodes in the past.  He thought it was related to what he was eating so eliminated it from his diet. At that time he felt nauseous and couldn't vomit, he forced himself to vomit which resulted in hematemesis. The pt wasn't able to tolerate PO in day prior to admission.  He wasn't able pass gas or have BM for 3 days prior to admission. Patient denied regular follow up with a PMD.  He reported having had a colonoscopy recently with no significant findings. Last drink was 3 years ago.  He also reported difficulty breathing.  He denied fevers, chills, chest pain, or urinary changes. Pt had a CT of the abdomen which showed a high-grade small bowel obstruction with transition point in the right lower quadrant. An NGT was placed in the ED and patient was made NPO for the OR. Pt went to the OR for a  exploratory laparotomy and lysis of adhesions. An abthera vac was placed.  SICU was consulted for post op hemodynamic monitoring. Pt required transfusion of 2U pRBC.  Pt was admitted to SICU after he was out of the OR. Pt remained intubated, was continued on DVT prophylaxis, antibiotics and CIWA protocol.     ON 12/7: pressors were continued overnight, requring levo and vaso. precedex for sedation, WBC improved, hyperkalemic after repletion however improved during morning, lactate improved at 3, s/p 500cc albumin overnight. Pt was prepped for OR for tomorrow 12/8 for washout.     On 12/8: pressor requirements labile overnight, tachycardic to 120s-140s overnight. Towards end of night went down to 80s, cardiac workup sent which showed trop of 48, received total of 1.5L normal saline during day, 2U Plasma, 250cc 5% albumin overnight and 1L NS overnight, lactate cleared, most recently 2, and Cr improving. Cardiology was consulted for cardiac management.  Pt went back to the OR for an abdominal washout and still residual ischemic bowel so decided not to close, a remaining 165 cm of viable bowel, abthera vac was replaced.    On 12/9: Pt was given 2x FFP and vitamin K 10, repeat INR 1.53 and 1.11, continued Zosyn for 7 day course for bowel ischemia, 500 IVF, UOP 50, EBL 5, on alvaro and vaso, Off Alvaro since 10 PM. Pt was seen by dietician. Occupational therapy was consulted and recommended ARLENE on discharge. Pt was pre-oped for ex lap wound washout possible ostomy creation possible abthera placement possible abd closure for tomorrow 12/10.    On 12/10:  IV fluids changed to plasmalyte, running at 125. Pt went back to the OR for a partial colectomy with mucous fistula and colostomy or ileostomy creation, end ileostomy, abdominal washout and exploratory laparotomy. JOVANNA drain was placed. EBL was 50ml, given 1L crystalloid intraop, 150 cc urine output.     On 12/11: A line from right radial artery removed, A line place in left radial artery. Right IJ Triple lumen accidentally pulled out partially in OR, CXR obtained and is out 2 cm, but still functional. Not replaced. Pt was extubated at 4pm, Acidotic after extubation, placed on Bipap 12/5 initially without much improvement, increased to 15/5, started on Duonebs and Pulmicort, tachycardic overnight. Found to be in SVT on EKG. Controlled with lopressor push x1.     On 12/12: Dietician rec pending tolerance to clear liquids, advance to Low Fiber diet, when diet is advanced, add 3 servings Ensure Enlive (350cal, 20Gm protein per 8oz serving), If EN is warranted, initiate Vital1.2 at 10ml/hr, increase as tolerated to goal rate 50ml/hour x 24 hours to provide 1200ml formula, 1440cal/day, 90Gm protein/day, 973ml free water; meets 27cal/Kg and 1.7Gm protein/Kg per dosing wt 54.2Kg.    On 12/13:discontinued rosas, passed trial of void, diuresed w/ 20 of lasix, pt pulled out NGT in the AM, reinserted in PM w/ immediate output of about 600cc, persistently tachycardic to 100~110's, metoprolol increased from 5mg q8 to q6    On 12/14: Central line was DC'ed, now has two peripheral IVs, PICC line to be placed tomorrow for TPN , Got Diamox x1 during the day    On 12/15:  Adv to [facility]. 20 Lasix x 1. In resp distress in afternoon, given additional 20mg IV lasix, started on Bipap, given 20mg Solumedrol for possible COPD exacerbation.  ABG obtained which showed respiratory acidosis, IPAP inc from 10 to 14. Hypotensive requiring phenylephrine. CXR showed moderate left-sided PTX. L pigtail was placed. New onset A.fib, started on Alvaro. Physical therapy was consulted and recommended ARLENE    12/16: sig improvement after pigtail. off bipap for most of evening, now on rec NC. Holding diuersis. Diet advanced to clears and later regular diet in the evening. Continuing TPN overlap for another day. Elevated WBC likely reactive.     12/17: Monitor ileostomy output, could need lomotil. f/u am CXR    Pt with persistent high ileostomy output with IVF repletion. Suspect pt not absorbing oral nutrition well.  TPN continued to help manage fluid and nutritional loses.  Pt increased doses of lomotil, imodium, tincture of opium, octreotide, and cholestyramine.     12/28: Hold BB.    1/10: Pt with persistent Pleural effusion went to OR w/ Thoracic for VATS w/ placement of Left Pleurx catheter    1/13: returned to SICU after RRT. +20 lasix, pigtail placed to suction and 750mL output    1/12-1/14:  Pt's H&H noted to be drifting and increased work of breathing and Pigtail was noted with decreased drainage.    1/15:  Chest CT performed showing Loculated large left pleural effusion with areas of hyperattenuation likely secondary to hematoma. Interval increase in size of the loculated left pleural effusion since 12/30/2019. Left sided chest tube terminating in the pleural space. Trace left pneumothorax. Extensive left subcutaneous emphysema. Hematoma is also noted along the left lateral chest wall.  Plan was made to bring pt to OR where he underwent Lt VATS, Evacuation of hemothorax, Partial decortication.  Pt was hemodynamically unstable postop and was resuscitated in SICU.  Concern for sepsis.  TPN stopped and PICC removed.      1/20: Pt had good appetite and calorie count. PICC exchange. acapella. OOB to chair.     1/21: PICC exchanged by IR. 1/2 strength TPN restarted.    1/22: Lt base chest tube d/c'ed.     1/24: Pt had unexplained polyuria which improved w/ DDAVP 2 mcg IV x1.    1/25:  Pt w/ hypotension.  DDAVP 2mcg SQ x 2. check ADH and am cortisol level. Thoracic to D/C other chest tube. Bolused 500mL x 1 for hypotension. Overnight bolused additional 500mL, trops checked and BCx sent. H/H dropped to 6.7/21.4 and transfused 1 unit PRBC, repeat 7.4/23.4.     1/26: D/C rosas. cosyntropin stim test. check TSH, Free T4. f/u blood cultures    1/27: Continue pain control w/ Tylenol, c/w budesonide, wean off duonebs, c/w bipap at night    1/28: DDAVP 2mcg x 1. +1L plasmalyte bolus    1/31: DDAVP 4mcg, f/u cosyntropin study, confirm weight, discuss w/ Dr. Siegel to increasing TPN.      2/1: CXR ordered for Monday morning, monitor UOP, if increases consider DDAVP     2/2: bolused 1L NS. CBC repeated     2/4:Pt RTOR for Ex Lap, Rt sided end ileostomy and mucous fistula taken down, dermatology f/u for rash     2/10: large melena, pending GI for ?EGD, npo after midnight.  Pt given 1U pRBCs.  Protonix was increased.    2/11: EGD held today, tomorrow  Colonoscopy+EGD, npo after midnight, pt remained hemodynamically stable.    2/12: Pt had EGD showing Normal upper third of esophagus and middle third of esophagus, 4cm hiatus hernia, Erythematous mucosa in the gastric body, Nodular mucosa in the gastric fundus and in the gastric body. Biopsied (path showed just gastritis), Normal duodenum noted, No evidence of GI bleeding seen in the upper GI tract.  Colonoscopy had a poor prep limiting visualization.  The examined terminal ileum appeared normal.    There was evidence of a prior end-to-side ileo-colonic anastomosis in the ascending colon.  This was patent and was visualized after lavage and suctioning of a large amount of semi-solid stool. The anastomosis was characterized by friable mucosa, some pallor but was otherwise intact appearance. The anastomosis was traversed. Careful evaluation of the anastomosis revealed no bleeding or stigmata of bleeding.  Severe diverticular disease, characterized by many small and large-mouthed diverticula were found in the sigmoid colon.        Pt remained hemodynamically stable and diet was advanced.  Pt tolerating LRD diet, but appetite slowed down.  Pt was started on Marinol to help stimulate appetite.      Pt has continued to need TPN for on going severe protein-calorie malnutrition secondary to short bowel syndrome.  Pt had demonstrated poor fat absorption which he had ileostomy demonstrated by fecal fat testing and fat soluble vitamin studies.  Pt had slow improvement of nutritional indices with ongoing losses from his high output ileostomy, post operative state, persistent pleural effusions with high output.   Pt could not eat enough orally to support his nutritional needs.  After ileostomy reversal mild improvement was noted, but not enough to stop TPN.  Pt admitted nutritionally depleted.    Currently pt is tolerating TPN at 1/2 strength now cycled to 12hrs.  Pt had elevated LFTs which improved with 1/2 strength TPN and pt tolerating / eating about 1/2 to 3/4's his meals.  Pt noted with SIADH which improved with decreased TPN volume.   While on Octreotid he required less K.  Pt has also required elevated potassium in TPN and separate repletion during diuresis. Pt continues to require elevated quantities of Mg in TPN from chronic Protonix use.    .  Pt has had times where his HypoPhos was repleted with increased NaPhos in TPN,   Pt continues to require increased Ca in TPN, VIt D repletion with Ergocalciferol.  VIt E & A deficiency repleted by MVI in TPN.      Pt requiring TPN still in the near future.  Once home and resumes normal activity  & diet it might be reasonable to trial off TPN if his albumin improves and he gains weight.  He might eventually be a candidate for Gattex or require life-long TPN    Pt's pain from Ileostomy reversal w/ MIRYAM subsided where he only requires occasional oral pain medication.      Pt is hemodynamically stable, Afebrile, without leukocytosis.  Pt is tolerating LRD and improving oral intact or making high calorie/ protein choices when appetite is low.  Pt is ambulating with assist.  Pt with normal bladder and improving bowel function, but still having loose bowel movement.   Pt is accepting of transfer to subacute rehab.  Pt knows he needs to eat well balanced meals.  Pt knows he needs to follow up with SUrgery, TPN Nutrition, Primary Care, and Pulmonary doctors upon discharge home, 73 y/o with PMhx of COPD and ETOH abuse presented to the ED on 12/6/19 with abdominal pain x2 days. He reported that pain felt more like gas pain, and he had similar episodes in the past.  He thought it was related to what he was eating so eliminated it from his diet. At that time he felt nauseous and couldn't vomit, he forced himself to vomit which resulted in hematemesis. The pt wasn't able to tolerate PO in day prior to admission.  He wasn't able pass gas or have BM for 3 days prior to admission. Patient denied regular follow up with a PMD.  He reported having had a colonoscopy recently with no significant findings. Last drink was 3 years ago.  He also reported difficulty breathing.  He denied fevers, chills, chest pain, or urinary changes. Pt had a CT of the abdomen which showed a high-grade small bowel obstruction with transition point in the right lower quadrant. An NGT was placed in the ED and patient was made NPO for the OR. Pt went to the OR for a  exploratory laparotomy and lysis of adhesions. An abthera vac was placed.  SICU was consulted for post op hemodynamic monitoring. Pt required transfusion of 2U pRBC.  Pt was admitted to SICU after he was out of the OR. Pt remained intubated, was continued on DVT prophylaxis, antibiotics and CIWA protocol.     ON 12/7: pressors were continued overnight, requring levo and vaso. precedex for sedation, WBC improved, hyperkalemic after repletion however improved during morning, lactate improved at 3, s/p 500cc albumin overnight. Pt was prepped for OR for tomorrow 12/8 for washout.     On 12/8: pressor requirements labile overnight, tachycardic to 120s-140s overnight. Towards end of night went down to 80s, cardiac workup sent which showed trop of 48, received total of 1.5L normal saline during day, 2U Plasma, 250cc 5% albumin overnight and 1L NS overnight, lactate cleared, most recently 2, and Cr improving. Cardiology was consulted for cardiac management.  Pt went back to the OR for an abdominal washout and still residual ischemic bowel so decided not to close, a remaining 165 cm of viable bowel, abthera vac was replaced.    On 12/9: Pt was given 2x FFP and vitamin K 10, repeat INR 1.53 and 1.11, continued Zosyn for 7 day course for bowel ischemia, 500 IVF, UOP 50, EBL 5, on alvaro and vaso, Off Alvaro since 10 PM. Pt was seen by dietician. Occupational therapy was consulted and recommended ARLENE on discharge. Pt was pre-oped for ex lap wound washout possible ostomy creation possible abthera placement possible abd closure for tomorrow 12/10.    On 12/10:  IV fluids changed to plasmalyte, running at 125. Pt went back to the OR for a partial colectomy with mucous fistula and colostomy or ileostomy creation, end ileostomy, abdominal washout and exploratory laparotomy. JOVANNA drain was placed. EBL was 50ml, given 1L crystalloid intraop, 150 cc urine output.     On 12/11: A line from right radial artery removed, A line place in left radial artery. Right IJ Triple lumen accidentally pulled out partially in OR, CXR obtained and is out 2 cm, but still functional. Not replaced. Pt was extubated at 4pm, Acidotic after extubation, placed on Bipap 12/5 initially without much improvement, increased to 15/5, started on Duonebs and Pulmicort, tachycardic overnight. Found to be in SVT on EKG. Controlled with lopressor push x1.     On 12/12: Dietician rec pending tolerance to clear liquids, advance to Low Fiber diet, when diet is advanced, add 3 servings Ensure Enlive (350cal, 20Gm protein per 8oz serving), If EN is warranted, initiate Vital1.2 at 10ml/hr, increase as tolerated to goal rate 50ml/hour x 24 hours to provide 1200ml formula, 1440cal/day, 90Gm protein/day, 973ml free water; meets 27cal/Kg and 1.7Gm protein/Kg per dosing wt 54.2Kg.    On 12/13:discontinued rosas, passed trial of void, diuresed w/ 20 of lasix, pt pulled out NGT in the AM, reinserted in PM w/ immediate output of about 600cc, persistently tachycardic to 100~110's, metoprolol increased from 5mg q8 to q6    On 12/14: Central line was DC'ed, now has two peripheral IVs, PICC line to be placed tomorrow for TPN , Got Diamox x1 during the day    On 12/15:  Adv to [facility]. 20 Lasix x 1. In resp distress in afternoon, given additional 20mg IV lasix, started on Bipap, given 20mg Solumedrol for possible COPD exacerbation.  ABG obtained which showed respiratory acidosis, IPAP inc from 10 to 14. Hypotensive requiring phenylephrine. CXR showed moderate left-sided PTX. L pigtail was placed. New onset A.fib, started on Alvaro. Physical therapy was consulted and recommended ARLENE    12/16: sig improvement after pigtail. off bipap for most of evening, now on rec NC. Holding diuersis. Diet advanced to clears and later regular diet in the evening. Continuing TPN overlap for another day. Elevated WBC likely reactive.     12/17: Monitor ileostomy output, could need lomotil. f/u am CXR    Pt with persistent high ileostomy output with IVF repletion. Suspect pt not absorbing oral nutrition well.  TPN continued to help manage fluid and nutritional loses.  Pt increased doses of lomotil, imodium, tincture of opium, octreotide, and cholestyramine.     12/28: Hold BB.    1/10: Pt with persistent Pleural effusion went to OR w/ Thoracic for VATS w/ placement of Left Pleurx catheter    1/13: returned to SICU after RRT. +20 lasix, pigtail placed to suction and 750mL output    1/12-1/14:  Pt's H&H noted to be drifting and increased work of breathing and Pigtail was noted with decreased drainage.    1/15:  Chest CT performed showing Loculated large left pleural effusion with areas of hyperattenuation likely secondary to hematoma. Interval increase in size of the loculated left pleural effusion since 12/30/2019. Left sided chest tube terminating in the pleural space. Trace left pneumothorax. Extensive left subcutaneous emphysema. Hematoma is also noted along the left lateral chest wall.  Plan was made to bring pt to OR where he underwent Lt VATS, Evacuation of hemothorax, Partial decortication.  Pt was hemodynamically unstable postop and was resuscitated in SICU.  Concern for sepsis.  TPN stopped and PICC removed.      1/20: Pt had good appetite and calorie count. PICC exchange. acapella. OOB to chair.     1/21: PICC exchanged by IR. 1/2 strength TPN restarted.    1/22: Lt base chest tube d/c'ed.     1/24: Pt had unexplained polyuria which improved w/ DDAVP 2 mcg IV x1.    1/25:  Pt w/ hypotension.  DDAVP 2mcg SQ x 2. check ADH and am cortisol level. Thoracic to D/C other chest tube. Bolused 500mL x 1 for hypotension. Overnight bolused additional 500mL, trops checked and BCx sent. H/H dropped to 6.7/21.4 and transfused 1 unit PRBC, repeat 7.4/23.4.     1/26: D/C rosas. cosyntropin stim test. check TSH, Free T4. f/u blood cultures    1/27: Continue pain control w/ Tylenol, c/w budesonide, wean off duonebs, c/w bipap at night    1/28: DDAVP 2mcg x 1. +1L plasmalyte bolus    1/31: DDAVP 4mcg, f/u cosyntropin study, confirm weight, discuss w/ Dr. Siegel to increasing TPN.      2/1: CXR ordered for Monday morning, monitor UOP, if increases consider DDAVP     2/2: bolused 1L NS. CBC repeated     2/4:Pt RTOR for Ex Lap, Rt sided end ileostomy and mucous fistula taken down, dermatology f/u for rash     2/10: large melena, pending GI for ?EGD, npo after midnight.  Pt given 1U pRBCs.  Protonix was increased.    2/11: EGD held today, tomorrow  Colonoscopy+EGD, npo after midnight, pt remained hemodynamically stable.    2/12: Pt had EGD showing Normal upper third of esophagus and middle third of esophagus, 4cm hiatus hernia, Erythematous mucosa in the gastric body, Nodular mucosa in the gastric fundus and in the gastric body. Biopsied (path showed just gastritis), Normal duodenum noted, No evidence of GI bleeding seen in the upper GI tract.  Colonoscopy had a poor prep limiting visualization.  The examined terminal ileum appeared normal.    There was evidence of a prior end-to-side ileo-colonic anastomosis in the ascending colon.  This was patent and was visualized after lavage and suctioning of a large amount of semi-solid stool. The anastomosis was characterized by friable mucosa, some pallor but was otherwise intact appearance. The anastomosis was traversed. Careful evaluation of the anastomosis revealed no bleeding or stigmata of bleeding.  Severe diverticular disease, characterized by many small and large-mouthed diverticula were found in the sigmoid colon.        2/13-2/27: Pt remained hemodynamically stable and diet was advanced.  Pt tolerating LRD diet, but appetite slowed down.  Pt was started on Marinol to help stimulate appetite.  Pt has continued to need TPN for on going severe protein-calorie malnutrition secondary to short bowel syndrome.  Pt had demonstrated poor fat absorption which he had ileostomy demonstrated by fecal fat testing and fat soluble vitamin studies.  Pt had slow improvement of nutritional indices with ongoing losses from his high output ileostomy, post operative state, persistent pleural effusions with high output.   Pt could not eat enough orally to support his nutritional needs.  After ileostomy reversal mild improvement was noted, but not enough to stop TPN.  Currently pt is tolerating TPN at 1/2 strength now cycled to 12hrs.  Pt had elevated LFTs which improved with 1/2 strength TPN and pt tolerating / eating about 1/2 to 3/4's his meals.  Pt noted with SIADH which improved with decreased TPN volume.   While on Octreotid he required less K.  Pt has also required elevated potassium in TPN and separate repletion during diuresis. Pt continues to require elevated quantities of Mg in TPN from chronic Protonix use. Pt has had times where his HypoPhos was repleted with increased NaPhos in TPN,   Pt continues to require increased Ca in TPN, VIt D repletion with Ergocalciferol.  VIt E & A deficiency repleted by MVI in TPN.      Pt's pain from Ileostomy reversal w/ MIRYAM subsided where he only requires occasional oral pain medication.          Pt is hemodynamically stable, Afebrile, without leukocytosis.  Pt is tolerating LRD and improving oral intact or making high calorie/ protein choices when appetite is low.  Pt is ambulating with assist.  Pt with normal bladder and improving bowel function, but still having loose bowel movement.   Pt is accepting of transfer to subacute rehab.  Pt knows he needs to eat well balanced meals.  Pt knows he needs to follow up with SUrgery, TPN Nutrition, Primary Care, and Pulmonary doctors upon discharge home,

## 2020-02-20 NOTE — DISCHARGE NOTE PROVIDER - NSDCFUADDINST_GEN_ALL_CORE_FT
Dry sterile dressing can be changed daily to midline incision and old ostomy site with 4x4 guaze and tape. No packing Required. Dry sterile dressing can be changed daily to midline incision and old ostomy site with 4x4 guaze and tape. No packing Required.    PICC  as per protocol    Bathing as directed

## 2020-02-21 LAB
ALBUMIN SERPL ELPH-MCNC: 2.9 G/DL — LOW (ref 3.3–5)
ALP SERPL-CCNC: 120 U/L — SIGNIFICANT CHANGE UP (ref 40–120)
ALT FLD-CCNC: 96 U/L — HIGH (ref 10–45)
ANION GAP SERPL CALC-SCNC: 9 MMOL/L — SIGNIFICANT CHANGE UP (ref 5–17)
AST SERPL-CCNC: 30 U/L — SIGNIFICANT CHANGE UP (ref 10–40)
BILIRUB SERPL-MCNC: 0.4 MG/DL — SIGNIFICANT CHANGE UP (ref 0.2–1.2)
BUN SERPL-MCNC: 16 MG/DL — SIGNIFICANT CHANGE UP (ref 7–23)
CA-I BLD-SCNC: 1.25 MMOL/L — SIGNIFICANT CHANGE UP (ref 1.12–1.3)
CALCIUM SERPL-MCNC: 8.9 MG/DL — SIGNIFICANT CHANGE UP (ref 8.4–10.5)
CHLORIDE SERPL-SCNC: 99 MMOL/L — SIGNIFICANT CHANGE UP (ref 96–108)
CHLORIDE UR-SCNC: 103 MMOL/L — SIGNIFICANT CHANGE UP
CO2 SERPL-SCNC: 24 MMOL/L — SIGNIFICANT CHANGE UP (ref 22–31)
CREAT ?TM UR-MCNC: 32 MG/DL — SIGNIFICANT CHANGE UP
CREAT SERPL-MCNC: 0.46 MG/DL — LOW (ref 0.5–1.3)
GLUCOSE SERPL-MCNC: 100 MG/DL — HIGH (ref 70–99)
HCT VFR BLD CALC: 25.5 % — LOW (ref 39–50)
HGB BLD-MCNC: 7.9 G/DL — LOW (ref 13–17)
MAGNESIUM SERPL-MCNC: 1.9 MG/DL — SIGNIFICANT CHANGE UP (ref 1.6–2.6)
MCHC RBC-ENTMCNC: 29.2 PG — SIGNIFICANT CHANGE UP (ref 27–34)
MCHC RBC-ENTMCNC: 31 GM/DL — LOW (ref 32–36)
MCV RBC AUTO: 94.1 FL — SIGNIFICANT CHANGE UP (ref 80–100)
NRBC # BLD: 0 /100 WBCS — SIGNIFICANT CHANGE UP (ref 0–0)
PHOSPHATE SERPL-MCNC: 4.6 MG/DL — HIGH (ref 2.5–4.5)
PLATELET # BLD AUTO: 418 K/UL — HIGH (ref 150–400)
POTASSIUM SERPL-MCNC: 4.6 MMOL/L — SIGNIFICANT CHANGE UP (ref 3.5–5.3)
POTASSIUM SERPL-SCNC: 4.6 MMOL/L — SIGNIFICANT CHANGE UP (ref 3.5–5.3)
POTASSIUM UR-SCNC: 44 MMOL/L — SIGNIFICANT CHANGE UP
PROT SERPL-MCNC: 7.1 G/DL — SIGNIFICANT CHANGE UP (ref 6–8.3)
RBC # BLD: 2.71 M/UL — LOW (ref 4.2–5.8)
RBC # FLD: 14.3 % — SIGNIFICANT CHANGE UP (ref 10.3–14.5)
SODIUM SERPL-SCNC: 132 MMOL/L — LOW (ref 135–145)
SODIUM UR-SCNC: 89 MMOL/L — SIGNIFICANT CHANGE UP
WBC # BLD: 12.87 K/UL — HIGH (ref 3.8–10.5)
WBC # FLD AUTO: 12.87 K/UL — HIGH (ref 3.8–10.5)

## 2020-02-21 PROCEDURE — 99233 SBSQ HOSP IP/OBS HIGH 50: CPT

## 2020-02-21 RX ORDER — ELECTROLYTE SOLUTION,INJ
1 VIAL (ML) INTRAVENOUS
Refills: 0 | Status: DISCONTINUED | OUTPATIENT
Start: 2020-02-21 | End: 2020-02-21

## 2020-02-21 RX ORDER — I.V. FAT EMULSION 20 G/100ML
10.4 EMULSION INTRAVENOUS
Qty: 25 | Refills: 0 | Status: DISCONTINUED | OUTPATIENT
Start: 2020-02-21 | End: 2020-02-22

## 2020-02-21 RX ADMIN — Medication 3 MILLILITER(S): at 17:48

## 2020-02-21 RX ADMIN — Medication 3 MILLILITER(S): at 01:08

## 2020-02-21 RX ADMIN — OXYCODONE HYDROCHLORIDE 5 MILLIGRAM(S): 5 TABLET ORAL at 00:24

## 2020-02-21 RX ADMIN — ENOXAPARIN SODIUM 40 MILLIGRAM(S): 100 INJECTION SUBCUTANEOUS at 12:03

## 2020-02-21 RX ADMIN — PANTOPRAZOLE SODIUM 40 MILLIGRAM(S): 20 TABLET, DELAYED RELEASE ORAL at 12:02

## 2020-02-21 RX ADMIN — Medication 600 MILLIGRAM(S): at 05:53

## 2020-02-21 RX ADMIN — Medication 1 APPLICATION(S): at 18:27

## 2020-02-21 RX ADMIN — TAMSULOSIN HYDROCHLORIDE 0.4 MILLIGRAM(S): 0.4 CAPSULE ORAL at 21:34

## 2020-02-21 RX ADMIN — Medication 975 MILLIGRAM(S): at 23:30

## 2020-02-21 RX ADMIN — Medication 1 EACH: at 17:31

## 2020-02-21 RX ADMIN — Medication 5 MILLIGRAM(S): at 12:02

## 2020-02-21 RX ADMIN — OXYCODONE HYDROCHLORIDE 5 MILLIGRAM(S): 5 TABLET ORAL at 23:59

## 2020-02-21 RX ADMIN — Medication 12.5 MILLIGRAM(S): at 18:25

## 2020-02-21 RX ADMIN — Medication 1 APPLICATION(S): at 05:53

## 2020-02-21 RX ADMIN — Medication 1 APPLICATION(S): at 17:49

## 2020-02-21 RX ADMIN — CHLORHEXIDINE GLUCONATE 1 APPLICATION(S): 213 SOLUTION TOPICAL at 05:55

## 2020-02-21 RX ADMIN — Medication 975 MILLIGRAM(S): at 00:24

## 2020-02-21 RX ADMIN — BUDESONIDE AND FORMOTEROL FUMARATE DIHYDRATE 2 PUFF(S): 160; 4.5 AEROSOL RESPIRATORY (INHALATION) at 17:47

## 2020-02-21 RX ADMIN — Medication 600 MILLIGRAM(S): at 18:25

## 2020-02-21 RX ADMIN — BUDESONIDE AND FORMOTEROL FUMARATE DIHYDRATE 2 PUFF(S): 160; 4.5 AEROSOL RESPIRATORY (INHALATION) at 05:52

## 2020-02-21 RX ADMIN — OXYCODONE HYDROCHLORIDE 5 MILLIGRAM(S): 5 TABLET ORAL at 23:29

## 2020-02-21 RX ADMIN — CHLORHEXIDINE GLUCONATE 1 APPLICATION(S): 213 SOLUTION TOPICAL at 10:20

## 2020-02-21 RX ADMIN — I.V. FAT EMULSION 10.4 ML/HR: 20 EMULSION INTRAVENOUS at 17:30

## 2020-02-21 RX ADMIN — Medication 3 MILLILITER(S): at 11:33

## 2020-02-21 NOTE — PROGRESS NOTE ADULT - ASSESSMENT
A/P: 75 year old male w/ PMH of COPD and EtOH dependence with PSH/o appy, prostate surgery, & spine surgery  septic shock and high grade SBO s/p exploratory laparotomy, lysis of adhesions, decompression of bowel via enterotomy w/ primary repair, and Abthera VAC placement on 12/6; s/p take down of Abthera, washout and re-application of the Abthera vac on 12/8. Now s/p SBR and end ileostomy on 12/10.   TPN initally consulted to assist w/ management of pt's nutrition in pt with SGS/malabsorption and had high Ileostomy output.  Pt s/p Lt Chest Pigtail for effusion in IR with persistent high output on 1/10/2020. Pt had VATs & placement of Left PleurX catheter. Post op pt developed hemothorax and Respiratory Distress.  Pt s/p Lt chest Evacuation of 2L blood w/ placement of CT x2 on 1/15/2020.  Lt Pleural Effusion resolved and pt doing well after CT removed.  Pt also noted with Rt PNA vs Pleural Effusion that has resolved.  Pt is s/p Ex Lap, MIRYAM, & Closure of End Ileostomy on 2/4/2020.      Pt w/ improving severe Protein-Calorie Malnutrition- improving PreAlbumin, but only 19mg/dL        On going Malabsorption s/p Reversal of Ileostomy in pt w/ Short Gut Syndrome     Pt tolerating TPN at 1/2 strength appetite slowly improving - tolerating LRD               improving AST/ ALT, with nL LDH, Alk Phos, CPK- continue to monitor          1/2 Strength TPN: Amino Acids 60g, Dextrose 140g, Lipids 25g in 2000mL               with 3mL MTE & 10mL MVI         Pt on LRD- poor appetite- Marinol added & calorie count ongoing         (TPN at GOAL:  Amino Acids 120g, Dextrose 210g, and Lipids 50g)     -Protonix for recent h/o GIB & Gastritis noted on EGD-                no further bloody BM & H&H remains stable         EGD/ Colonoscopy- path noted  -Leukocytosis- improving, continue to monitor- no overt signs of infection  -HypoCa- improving w/increased Ca to 16mEq in TPN continue to monitor        Improved Ca levels helpful for BP & muscle contraction  -LowK - improving w/ 80mEq KCl in TPN, continue to monitor        maintaining K & Mg will help with GI motility   -LowMg- improving with increased Mg to 16mEq         continue to monitor as pt on Protonix   -HypoPhos- improving w/-increased NaPhos & will continue to monitor   -Concern for ongoing malabsrption- pt w/ Semisolid & loose BMs        Repeat Fat Soluble Vitamin studies -sent /pending        TPN w/ MVI to compensate for low Vit E & A        Vit D levels low- being supplemented w/Ergocalciferol         Vit K INR/ PT near normal suggestive that it is being absorbed   -Strict Intake and Output - continue to closely monitor  -Good glycemic control-  Fingersticks & ISS coverage -   -Weights three times a week  -Daily CMP, Mg, Ionized Ca, Phosphorus,        and Weekly Triglycerides and Pre-albumin  Continue as per Surgery, will follow with you, D/w primary team    Andreina Hubbard PA-C  TPN team, pager [phone]  D/w Dr Chacko A/P: 75 year old male w/ PMH of COPD and EtOH dependence with PSH/o appy, prostate surgery, & spine surgery  septic shock and high grade SBO s/p exploratory laparotomy, lysis of adhesions, decompression of bowel via enterotomy w/ primary repair, and Abthera VAC placement on 12/6; s/p take down of Abthera, washout and re-application of the Abthera vac on 12/8. Now s/p SBR and end ileostomy on 12/10.   TPN initally consulted to assist w/ management of pt's nutrition in pt with SGS/malabsorption and had high Ileostomy output.  Pt s/p Lt Chest Pigtail for effusion in IR with persistent high output on 1/10/2020. Pt had VATs & placement of Left PleurX catheter. Post op pt developed hemothorax and Respiratory Distress.  Pt s/p Lt chest Evacuation of 2L blood w/ placement of CT x2 on 1/15/2020.  Lt Pleural Effusion resolved and pt doing well after CT removed.  Pt also noted with Rt PNA vs Pleural Effusion that has resolved.  Pt is s/p Ex Lap, MIRYAM, & Closure of End Ileostomy on 2/4/2020.      Pt w/ improving severe Protein-Calorie Malnutrition- improving PreAlbumin, but only 19mg/dL        On going Malabsorption s/p Reversal of Ileostomy in pt w/ Short Gut Syndrome     Pt tolerating TPN at 1/2 strength appetite slowly improving - tolerating LRD               improving AST/ ALT, with nL LDH, Alk Phos, CPK- continue to monitor          1/2 Strength TPN: Amino Acids 60g, Dextrose 140g, Lipids 25g in 2000mL               with 3mL MTE & 10mL MVI         Pt on LRD- poor appetite- Marinol added & calorie count ongoing         (TPN at GOAL:  Amino Acids 120g, Dextrose 210g, and Lipids 50g)     -HypoNA- decreased NaPhos from 30-15mMol in TPN, continue to monitor        Urine 'lytes ordered        pt not edematous, making good urine  -HypoCa- improving w/increased Ca to 16mEq in TPN continue to monitor        Improved Ca levels helpful for BP & muscle contraction  -LowK - improving w/ 80mEq KCl in TPN, continue to monitor        maintaining K & Mg will help with GI motility   -LowMg- improving with increased Mg to 16mEq         continue to monitor as pt on Protonix   -HyperPhos- improving w/decreased NaPhos 30-15mMol & will continue to monitor   -Protonix for recent h/o GIB & Gastritis noted on EGD-                no further bloody BM & H&H remains stable         EGD/ Colonoscopy- path noted  -Leukocytosis- improving, continue to monitor- no overt signs of infection  -Concern for ongoing malabsrption- pt w/ Semisolid & loose BMs        Repeat Fat Soluble Vitamin studies -sent /pending        TPN w/ MVI to compensate for low Vit E & A        Vit D levels low- being supplemented w/Ergocalciferol         Vit K INR/ PT near normal suggestive that it is being absorbed   -Strict Intake and Output - continue to closely monitor  -Good glycemic control-  Fingersticks & ISS coverage -   -Weights three times a week  -Daily CMP, Mg, Ionized Ca, Phosphorus,        and Weekly Triglycerides and Pre-albumin  Continue as per Surgery, will follow with you, D/w primary team    Andreina Hubbard PA-C  TPN team, pager [phone]  D/w Dr Chacko  & MU Siegel

## 2020-02-21 NOTE — PROGRESS NOTE ADULT - ASSESSMENT
74 y/o M presenting with septic shock and high grade SBO s/p exploratory laparotomy, lysis of adhesions, decompression of bowel via enterotomy w/ primary repair, and Abthera VAC placement on 12/6; s/p take down of Abthera, washout and re-application of the Abthera vac on 12/8. 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 s/p L chest tube placement by IR 12/30 for pleural effusion now s/p VATS, with chest tube insertion for drainage of pleural effusion. RRT called 1/13 AM for hypoxia, patient transferred back to SICU.  Patient continued SOB, chest drain possible obstruction expanding. Patent taken back to OR emergently 1/15 for clot evac and VATS. Patient is now s/p Ileostomy reversal 2/4/20. Post-op c/b bloody BM and anemia requiring blood transfusion, now improved. Now tolerating regular diet.    PLAN:  - C/w Marinol for appetite stimulation  - Continue regular diet  - Wean TPN  - BID dressing changes (minimal gauze and tape)  - Monitor BM frequency and appearance  - Pain control as needed  - OOB with PT  - Dc planning for rehab  - Appreciate cv/pulm f/u   - Appreciate Dermatology eval/recs: Clobetasol 0.05% BID for 2 weeks. Outpatient follow up in 2 weeks on Tuesday mornings ([phone])    [facility]  [id number]

## 2020-02-21 NOTE — PROGRESS NOTE ADULT - SUBJECTIVE AND OBJECTIVE BOX
[facility] NUTRITION SUPPORT / TPN -- FOLLOW UP NOTE  --------------------------------------------------------------------------------    24 hour events/subjective:  (+)flatus  (+)semiformed BM   Ate cereal, small muffin, 3/4 ensure shake and 3/4 ensure clears         +/- appetite better         feeling less like he's forcing himself to eat since Marinol started        calorie count noted  Pt having issues w/ dentures, so he's choosing calorie dense liquids & soft foods       doesn't want mechanical soft or Full liquid diet  Pt on Protonix suspension QD  improving abdominal Pain  OOB ad rachel-  walks around the unit Qafternoon  No n/v  No cough/ cp/ palp/dyspnea/ sob  No f/c/s          Diet:  Diet, Low Fiber:   Supplement Feeding Modality:  Oral  Ensure Clear Cans or Servings Per Day:  1       Frequency:  Three Times a day  Ensure Enlive Cans or Servings Per Day:  1       Frequency:  Three Times a day (02-19-20 @ 10:36)      Appetite: [x  ]Poor [  ]Adequate [  ]Good  Caloric intake:  [  x ]  Adequate   [   ] Inadequate    ROS: General/ GI see HPI  all other systems negative      ALLERGIES & MEDICATIONS  --------------------------------------------------------------------------------  ALLERGIES  IV Contrast (Hives)    STANDING INPATIENT MEDICATIONS    albuterol/ipratropium for Nebulization. 3 milliLiter(s) Nebulizer every 6 hours  budesonide 160 MICROgram(s)/formoterol 4.5 MICROgram(s) Inhaler 2 Puff(s) Inhalation two times a day  chlorhexidine 2% Cloths 1 Application(s) Topical <User Schedule>  chlorhexidine 4% Liquid 1 Application(s) Topical <User Schedule>  clobetasol 0.05% Ointment 1 Application(s) Topical two times a day  dronabinol 5 milliGRAM(s) Oral daily  enoxaparin Injectable 40 milliGRAM(s) SubCutaneous daily  ergocalciferol 97706 Unit(s) Oral every week  fat emulsion (Fish Oil and Plant Based) 20% Infusion 10.4 mL/Hr IV Continuous <Continuous>  guaiFENesin  milliGRAM(s) Oral every 12 hours  metoprolol tartrate 12.5 milliGRAM(s) Oral every 12 hours  pantoprazole   Suspension 40 milliGRAM(s) Oral daily  Parenteral Nutrition - Adult 1 Each TPN Continuous <Continuous>  tamsulosin 0.4 milliGRAM(s) Oral at bedtime  triamcinolone 0.1% Ointment 1 Application(s) Topical two times a day      PRN INPATIENT MEDICATION  acetaminophen   Tablet .. 975 milliGRAM(s) Oral every 6 hours PRN  oxyCODONE    IR 5 milliGRAM(s) Oral every 4 hours PRN        VITALS/PHYSICAL EXAM  --------------------------------------------------------------------------------  T(C): 36.7 (02-21-20 @ 09:22), Max: 37.3 (02-21-20 @ 00:30)  HR: 91 (02-21-20 @ 09:22) (88 - 101)  BP: 114/69 (02-21-20 @ 09:22) (99/58 - 114/69)  RR: 18 (02-21-20 @ 09:22) (18 - 20)  SpO2: 95% (02-21-20 @ 09:22) (95% - 96%)  Wt(kg): --        02-20-20 @ 07:01  -  02-21-20 @ 07:00  --------------------------------------------------------  IN: 3037 mL / OUT: 1550 mL / NET: 1487 mL    02-21-20 @ 07:01  -  02-21-20 @ 10:10  --------------------------------------------------------  IN: 550 mL / OUT: 0 mL / NET: 550 mL    PHYSICAL EXAM:  --------------------------------------------------------------------------------  	Gen: guarded but stable, A&Ox3  	HEENT: NC/AT, PERRL, supple neck, trachea midline, mucosa moist              GI: (+) BS, nondistended, nontender                    abdominal dressing c/d/i              MSK: FROM x4, no contractures nor deformities                   LLE w/ brace   	Vascular: Equally Warm,  no edema, no clubbing, cyanosis,                      RUE PICC w/o sx infection  	Neuro: No focal deficits, grossly intact sensation, & strength   	Psych: Normal affect and mood              skin: moist, good turgor, erythematous pinpoint rash chest/ back      LABS/ CULTURES/ RADIOLOGY:              7.9    12.87 >-----------<  418      [02-21-20 @ 07:01]              25.5     132  |  99  |  16  ----------------------------<  100      [02-21-20 @ 06:59]  4.6   |  24  |  0.46        Ca     8.9     [02-21-20 @ 06:59]      iCa    1.25     [02-21 @ 07:03]      Mg     1.9     [02-21-20 @ 06:59]      Phos  4.6     [02-21-20 @ 06:59]    TPro  7.1  /  Alb  2.9  /  TBili  0.4  /  DBili  x   /  AST  30  /  ALT  96  /  AlkPhos  120  [02-21-20 @ 06:59]    Prealbumin, Serum: 19 mg/dL (02-20-20 @ 09:06)  Prealbumin, Serum: 16 mg/dL (02-15-20 @ 12:30)  Prealbumin, Serum: 17 mg/dL (02-10-20 @ 09:15)  Prealbumin, Serum: 21 mg/dL (02-03-20 @ 23:50)  Prealbumin, Serum: 19 mg/dL (02-03-20 @ 07:18)      Triglycerides, Serum: 38 mg/dL (02.20.20 @ 05:50) [facility] NUTRITION SUPPORT / TPN -- FOLLOW UP NOTE  --------------------------------------------------------------------------------    24 hour events/subjective:  (+)flatus  (+)semiformed BM   Ate cereal, small muffin, 3/4 ensure shake and 3/4 ensure clears         +/- appetite better         feeling less like he's forcing himself to eat since Marinol started        calorie count noted  Pt having issues w/ dentures, so he's choosing calorie dense liquids & soft foods       doesn't want mechanical soft or Full liquid diet  Pt on Protonix suspension QD  improving abdominal Pain  OOB ad rachel-  walks around the unit Qafternoon  No n/v  No cough/ cp/ palp/dyspnea/ sob  No f/c/s      Diet:  Diet, Low Fiber:   Supplement Feeding Modality:  Oral  Ensure Clear Cans or Servings Per Day:  1       Frequency:  Three Times a day  Ensure Enlive Cans or Servings Per Day:  1       Frequency:  Three Times a day (02-19-20 @ 10:36)      Appetite: [x  ]Poor [  ]Adequate [  ]Good  Caloric intake:  [  x ]  Adequate   [   ] Inadequate    ROS: General/ GI see HPI  all other systems negative      ALLERGIES & MEDICATIONS  --------------------------------------------------------------------------------  ALLERGIES  IV Contrast (Hives)    STANDING INPATIENT MEDICATIONS    albuterol/ipratropium for Nebulization. 3 milliLiter(s) Nebulizer every 6 hours  budesonide 160 MICROgram(s)/formoterol 4.5 MICROgram(s) Inhaler 2 Puff(s) Inhalation two times a day  chlorhexidine 2% Cloths 1 Application(s) Topical <User Schedule>  chlorhexidine 4% Liquid 1 Application(s) Topical <User Schedule>  clobetasol 0.05% Ointment 1 Application(s) Topical two times a day  dronabinol 5 milliGRAM(s) Oral daily  enoxaparin Injectable 40 milliGRAM(s) SubCutaneous daily  ergocalciferol 93108 Unit(s) Oral every week  fat emulsion (Fish Oil and Plant Based) 20% Infusion 10.4 mL/Hr IV Continuous <Continuous>  guaiFENesin  milliGRAM(s) Oral every 12 hours  metoprolol tartrate 12.5 milliGRAM(s) Oral every 12 hours  pantoprazole   Suspension 40 milliGRAM(s) Oral daily  Parenteral Nutrition - Adult 1 Each TPN Continuous <Continuous>  tamsulosin 0.4 milliGRAM(s) Oral at bedtime  triamcinolone 0.1% Ointment 1 Application(s) Topical two times a day      PRN INPATIENT MEDICATION  acetaminophen   Tablet .. 975 milliGRAM(s) Oral every 6 hours PRN  oxyCODONE    IR 5 milliGRAM(s) Oral every 4 hours PRN        VITALS/PHYSICAL EXAM  --------------------------------------------------------------------------------  T(C): 36.7 (02-21-20 @ 09:22), Max: 37.3 (02-21-20 @ 00:30)  HR: 91 (02-21-20 @ 09:22) (88 - 101)  BP: 114/69 (02-21-20 @ 09:22) (99/58 - 114/69)  RR: 18 (02-21-20 @ 09:22) (18 - 20)  SpO2: 95% (02-21-20 @ 09:22) (95% - 96%)  Wt(kg): --        02-20-20 @ 07:01  -  02-21-20 @ 07:00  --------------------------------------------------------  IN: 3037 mL / OUT: 1550 mL / NET: 1487 mL    02-21-20 @ 07:01  -  02-21-20 @ 10:10  --------------------------------------------------------  IN: 550 mL / OUT: 0 mL / NET: 550 mL    PHYSICAL EXAM:  --------------------------------------------------------------------------------  	Gen: guarded but stable, A&Ox3  	HEENT: NC/AT, PERRL, supple neck, trachea midline, mucosa moist              GI: (+) BS, nondistended, nontender                    abdominal dressing c/d/i              MSK: FROM x4, no contractures nor deformities                   LLE w/ brace   	Vascular: Equally Warm,  no edema, no clubbing, cyanosis,                      RUE PICC w/o sx infection  	Neuro: No focal deficits, grossly intact sensation, & strength   	Psych: Normal affect and mood              skin: moist, good turgor, erythematous pinpoint rash chest/ back      LABS/ CULTURES/ RADIOLOGY:              7.9    12.87 >-----------<  418      [02-21-20 @ 07:01]              25.5     132  |  99  |  16  ----------------------------<  100      [02-21-20 @ 06:59]  4.6   |  24  |  0.46        Ca     8.9     [02-21-20 @ 06:59]      iCa    1.25     [02-21 @ 07:03]      Mg     1.9     [02-21-20 @ 06:59]      Phos  4.6     [02-21-20 @ 06:59]    TPro  7.1  /  Alb  2.9  /  TBili  0.4  /  DBili  x   /  AST  30  /  ALT  96  /  AlkPhos  120  [02-21-20 @ 06:59]    Prealbumin, Serum: 19 mg/dL (02-20-20 @ 09:06)  Prealbumin, Serum: 16 mg/dL (02-15-20 @ 12:30)  Prealbumin, Serum: 17 mg/dL (02-10-20 @ 09:15)  Prealbumin, Serum: 21 mg/dL (02-03-20 @ 23:50)  Prealbumin, Serum: 19 mg/dL (02-03-20 @ 07:18)      Triglycerides, Serum: 38 mg/dL (02.20.20 @ 05:50)

## 2020-02-21 NOTE — PROGRESS NOTE ADULT - SUBJECTIVE AND OBJECTIVE BOX
Surgery Daily Progress Note    SUBJECTIVE:  - Patient seen and examined at bedside   - Patient states feeling well  - Denies abdominal pain, N/V, chest pain, SOB  --------------------------------------------------------------------------------------------------  OBJECTIVE:   Physical Exam:  General: AAOx3, NAD, lying comfortably in bed  HEENT: NC/AT  Respiratory: nonlabored breathing  Cardiovascular: RRR, normal S1 and S2, no murmurs or gallops  Abdomen: non-distended, soft, non-tender, dressings with minimal purulent stainings, dressings changed in am rounds  Extremities: WWP, no edema  --------------------------------------------------------------------------------------------------  V/S:  Vital Signs Last 24 Hrs  T(C): 36.7 (21 Feb 2020 09:22), Max: 37.3 (21 Feb 2020 00:30)  T(F): 98 (21 Feb 2020 09:22), Max: 99.2 (21 Feb 2020 00:30)  HR: 91 (21 Feb 2020 09:22) (88 - 101)  BP: 114/69 (21 Feb 2020 09:22) (99/58 - 114/69)  BP(mean): --  RR: 18 (21 Feb 2020 09:22) (18 - 20)  SpO2: 95% (21 Feb 2020 09:22) (95% - 96%)    --------------------------------------------------------------------------------------------------  I/Os:    20 Feb 2020 07:01  -  21 Feb 2020 07:00  --------------------------------------------------------  IN:    fat emulsion (Fish Oil and Plant Based) 20% Infusion: 125 mL    Oral Fluid: 920 mL    TPN (Total Parenteral Nutrition): 1992 mL  Total IN: 3037 mL    OUT:    Voided: 1550 mL  Total OUT: 1550 mL    Total NET: 1487 mL      21 Feb 2020 07:01  -  21 Feb 2020 09:59  --------------------------------------------------------  IN:    Oral Fluid: 550 mL  Total IN: 550 mL    OUT:  Total OUT: 0 mL    Total NET: 550 mL        --------------------------------------------------------------------------------------------------  LABS:                        7.9    12.87 )-----------( 418      ( 21 Feb 2020 07:01 )             25.5     21 Feb 2020 06:59    132    |  99     |  16     ----------------------------<  100    4.6     |  24     |  0.46     Ca    8.9        21 Feb 2020 06:59  Phos  4.6       21 Feb 2020 06:59  Mg     1.9       21 Feb 2020 06:59    TPro  7.1    /  Alb  2.9    /  TBili  0.4    /  DBili  x      /  AST  30     /  ALT  96     /  AlkPhos  120    21 Feb 2020 06:59      CAPILLARY BLOOD GLUCOSE            LIVER FUNCTIONS - ( 21 Feb 2020 06:59 )  Alb: 2.9 g/dL / Pro: 7.1 g/dL / ALK PHOS: 120 U/L / ALT: 96 U/L / AST: 30 U/L / GGT: x               --------------------------------------------------------------------------------------------------  MEDICATIONS  (STANDING):  albuterol/ipratropium for Nebulization. 3 milliLiter(s) Nebulizer every 6 hours  budesonide 160 MICROgram(s)/formoterol 4.5 MICROgram(s) Inhaler 2 Puff(s) Inhalation two times a day  chlorhexidine 2% Cloths 1 Application(s) Topical <User Schedule>  chlorhexidine 4% Liquid 1 Application(s) Topical <User Schedule>  clobetasol 0.05% Ointment 1 Application(s) Topical two times a day  dronabinol 5 milliGRAM(s) Oral daily  enoxaparin Injectable 40 milliGRAM(s) SubCutaneous daily  ergocalciferol 12992 Unit(s) Oral every week  fat emulsion (Fish Oil and Plant Based) 20% Infusion 10.4 mL/Hr (10.4 mL/Hr) IV Continuous <Continuous>  guaiFENesin  milliGRAM(s) Oral every 12 hours  metoprolol tartrate 12.5 milliGRAM(s) Oral every 12 hours  pantoprazole   Suspension 40 milliGRAM(s) Oral daily  Parenteral Nutrition - Adult 1 Each (83 mL/Hr) TPN Continuous <Continuous>  tamsulosin 0.4 milliGRAM(s) Oral at bedtime  triamcinolone 0.1% Ointment 1 Application(s) Topical two times a day    MEDICATIONS  (PRN):  acetaminophen   Tablet .. 975 milliGRAM(s) Oral every 6 hours PRN Mild Pain (1 - 3)  oxyCODONE    IR 5 milliGRAM(s) Oral every 4 hours PRN Moderate Pain (4 - 6)

## 2020-02-21 NOTE — PROGRESS NOTE ADULT - SUBJECTIVE AND OBJECTIVE BOX
Follow-up Pulm Progress Note    States GUTIERREZ unchanged, but was able to tolerate more activity with PT  Sats 97% RA  Denies SOB at rest  Denies CP    Medications:  MEDICATIONS  (STANDING):  albuterol/ipratropium for Nebulization. 3 milliLiter(s) Nebulizer every 6 hours  budesonide 160 MICROgram(s)/formoterol 4.5 MICROgram(s) Inhaler 2 Puff(s) Inhalation two times a day  chlorhexidine 2% Cloths 1 Application(s) Topical <User Schedule>  chlorhexidine 4% Liquid 1 Application(s) Topical <User Schedule>  clobetasol 0.05% Ointment 1 Application(s) Topical two times a day  dronabinol 5 milliGRAM(s) Oral daily  enoxaparin Injectable 40 milliGRAM(s) SubCutaneous daily  ergocalciferol 19417 Unit(s) Oral every week  fat emulsion (Fish Oil and Plant Based) 20% Infusion 10.4 mL/Hr (10.4 mL/Hr) IV Continuous <Continuous>  guaiFENesin  milliGRAM(s) Oral every 12 hours  metoprolol tartrate 12.5 milliGRAM(s) Oral every 12 hours  pantoprazole   Suspension 40 milliGRAM(s) Oral daily  Parenteral Nutrition - Adult 1 Each (83 mL/Hr) TPN Continuous <Continuous>  Parenteral Nutrition - Adult 1 Each (83 mL/Hr) TPN Continuous <Continuous>  tamsulosin 0.4 milliGRAM(s) Oral at bedtime  triamcinolone 0.1% Ointment 1 Application(s) Topical two times a day    MEDICATIONS  (PRN):  acetaminophen   Tablet .. 975 milliGRAM(s) Oral every 6 hours PRN Mild Pain (1 - 3)  oxyCODONE    IR 5 milliGRAM(s) Oral every 4 hours PRN Moderate Pain (4 - 6)          Vital Signs Last 24 Hrs  T(C): 36.7 (21 Feb 2020 09:22), Max: 37.3 (21 Feb 2020 00:30)  T(F): 98 (21 Feb 2020 09:22), Max: 99.2 (21 Feb 2020 00:30)  HR: 91 (21 Feb 2020 09:22) (88 - 101)  BP: 114/69 (21 Feb 2020 09:22) (99/58 - 114/69)  BP(mean): --  RR: 18 (21 Feb 2020 09:22) (18 - 20)  SpO2: 95% (21 Feb 2020 09:22) (95% - 96%)          02-20 @ 07:01  -  02-21 @ 07:00  --------------------------------------------------------  IN: 3037 mL / OUT: 1550 mL / NET: 1487 mL          LABS:                        7.9    12.87 )-----------( 418      ( 21 Feb 2020 07:01 )             25.5     02-21    132<L>  |  99  |  16  ----------------------------<  100<H>  4.6   |  24  |  0.46<L>    Ca    8.9      21 Feb 2020 06:59  Phos  4.6     02-21  Mg     1.9     02-21    TPro  7.1  /  Alb  2.9<L>  /  TBili  0.4  /  DBili  x   /  AST  30  /  ALT  96<H>  /  AlkPhos  120  02-21        Physical Examination:  PULM: diminished BS throughout   CVS: RRR    RADIOLOGY REVIEWED  CXR: grossly clear

## 2020-02-21 NOTE — PROGRESS NOTE ADULT - SUBJECTIVE AND OBJECTIVE BOX
Subjective: Patient seen and examined. No new events except as noted.   walking with PT with walker     REVIEW OF SYSTEMS:    CONSTITUTIONAL:  ++weakness, fevers or chills  EYES/ENT: No visual changes;  No vertigo or throat pain   NECK: No pain or stiffness  RESPIRATORY: No cough, wheezing, hemoptysis; No shortness of breath  CARDIOVASCULAR: No chest pain or palpitations  GASTROINTESTINAL: No abdominal or epigastric pain. No nausea, vomiting, or hematemesis; No diarrhea or constipation. No melena or hematochezia.  GENITOURINARY: No dysuria, frequency or hematuria  NEUROLOGICAL: No numbness or weakness  SKIN: No itching, burning, rashes, or lesions   All other review of systems is negative unless indicated above.    MEDICATIONS:  MEDICATIONS  (STANDING):  albuterol/ipratropium for Nebulization. 3 milliLiter(s) Nebulizer every 6 hours  budesonide 160 MICROgram(s)/formoterol 4.5 MICROgram(s) Inhaler 2 Puff(s) Inhalation two times a day  chlorhexidine 2% Cloths 1 Application(s) Topical <User Schedule>  chlorhexidine 4% Liquid 1 Application(s) Topical <User Schedule>  clobetasol 0.05% Ointment 1 Application(s) Topical two times a day  dronabinol 5 milliGRAM(s) Oral daily  enoxaparin Injectable 40 milliGRAM(s) SubCutaneous daily  ergocalciferol 95817 Unit(s) Oral every week  fat emulsion (Fish Oil and Plant Based) 20% Infusion 10.4 mL/Hr (10.4 mL/Hr) IV Continuous <Continuous>  guaiFENesin  milliGRAM(s) Oral every 12 hours  metoprolol tartrate 12.5 milliGRAM(s) Oral every 12 hours  pantoprazole   Suspension 40 milliGRAM(s) Oral daily  Parenteral Nutrition - Adult 1 Each (83 mL/Hr) TPN Continuous <Continuous>  Parenteral Nutrition - Adult 1 Each (83 mL/Hr) TPN Continuous <Continuous>  tamsulosin 0.4 milliGRAM(s) Oral at bedtime  triamcinolone 0.1% Ointment 1 Application(s) Topical two times a day      PHYSICAL EXAM:  T(C): 36.9 (02-21-20 @ 13:14), Max: 37.3 (02-21-20 @ 00:30)  HR: 68 (02-21-20 @ 13:14) (68 - 101)  BP: 104/62 (02-21-20 @ 13:14) (99/58 - 114/69)  RR: 18 (02-21-20 @ 13:14) (18 - 20)  SpO2: 96% (02-21-20 @ 13:14) (95% - 96%)  Wt(kg): --  I&O's Summary    20 Feb 2020 07:01  -  21 Feb 2020 07:00  --------------------------------------------------------  IN: 3037 mL / OUT: 1550 mL / NET: 1487 mL    21 Feb 2020 07:01  -  21 Feb 2020 13:29  --------------------------------------------------------  IN: 1050 mL / OUT: 750 mL / NET: 300 mL          Appearance: Normal	  HEENT:   Normal oral mucosa, PERRL, EOMI	  Lymphatic: No lymphadenopathy , no edema  Cardiovascular: Normal S1 S2, No JVD, No murmurs , Peripheral pulses palpable 2+ bilaterally  Respiratory: Lungs clear to auscultation, normal effort 	  Gastrointestinal:  Soft, Non-tender, + BS	  Skin: No rashes, No ecchymoses, No cyanosis, warm to touch  Musculoskeletal: Normal range of motion, normal strength  Psychiatry:  Mood & affect appropriate  Ext: No edema      LABS:    CARDIAC MARKERS:                                7.9    12.87 )-----------( 418      ( 21 Feb 2020 07:01 )             25.5     02-21    132<L>  |  99  |  16  ----------------------------<  100<H>  4.6   |  24  |  0.46<L>    Ca    8.9      21 Feb 2020 06:59  Phos  4.6     02-21  Mg     1.9     02-21    TPro  7.1  /  Alb  2.9<L>  /  TBili  0.4  /  DBili  x   /  AST  30  /  ALT  96<H>  /  AlkPhos  120  02-21    proBNP:   Lipid Profile:   HgA1c:   TSH:             TELEMETRY: 	    ECG:  	  RADIOLOGY:   DIAGNOSTIC TESTING:  [ ] Echocardiogram:  [ ]  Catheterization:  [ ] Stress Test:    OTHER:

## 2020-02-22 LAB
ALBUMIN SERPL ELPH-MCNC: 2.9 G/DL — LOW (ref 3.3–5)
ALP SERPL-CCNC: 128 U/L — HIGH (ref 40–120)
ALT FLD-CCNC: 94 U/L — HIGH (ref 10–45)
ANION GAP SERPL CALC-SCNC: 10 MMOL/L — SIGNIFICANT CHANGE UP (ref 5–17)
AST SERPL-CCNC: 30 U/L — SIGNIFICANT CHANGE UP (ref 10–40)
BILIRUB SERPL-MCNC: 0.3 MG/DL — SIGNIFICANT CHANGE UP (ref 0.2–1.2)
BUN SERPL-MCNC: 17 MG/DL — SIGNIFICANT CHANGE UP (ref 7–23)
CA-I BLD-SCNC: 1.27 MMOL/L — SIGNIFICANT CHANGE UP (ref 1.12–1.3)
CALCIUM SERPL-MCNC: 8.8 MG/DL — SIGNIFICANT CHANGE UP (ref 8.4–10.5)
CHLORIDE SERPL-SCNC: 97 MMOL/L — SIGNIFICANT CHANGE UP (ref 96–108)
CO2 SERPL-SCNC: 24 MMOL/L — SIGNIFICANT CHANGE UP (ref 22–31)
CREAT SERPL-MCNC: 0.43 MG/DL — LOW (ref 0.5–1.3)
GLUCOSE SERPL-MCNC: 102 MG/DL — HIGH (ref 70–99)
MAGNESIUM SERPL-MCNC: 1.8 MG/DL — SIGNIFICANT CHANGE UP (ref 1.6–2.6)
OSMOLALITY UR: 483 MOSM/KG — SIGNIFICANT CHANGE UP (ref 50–1200)
PHOSPHATE SERPL-MCNC: 4 MG/DL — SIGNIFICANT CHANGE UP (ref 2.5–4.5)
POTASSIUM SERPL-MCNC: 4.3 MMOL/L — SIGNIFICANT CHANGE UP (ref 3.5–5.3)
POTASSIUM SERPL-SCNC: 4.3 MMOL/L — SIGNIFICANT CHANGE UP (ref 3.5–5.3)
PROT SERPL-MCNC: 7.2 G/DL — SIGNIFICANT CHANGE UP (ref 6–8.3)
SODIUM SERPL-SCNC: 131 MMOL/L — LOW (ref 135–145)
UUN UR-MCNC: 536 MG/DL — SIGNIFICANT CHANGE UP

## 2020-02-22 PROCEDURE — 99233 SBSQ HOSP IP/OBS HIGH 50: CPT

## 2020-02-22 RX ORDER — I.V. FAT EMULSION 20 G/100ML
10.4 EMULSION INTRAVENOUS
Qty: 25 | Refills: 0 | Status: DISCONTINUED | OUTPATIENT
Start: 2020-02-22 | End: 2020-02-23

## 2020-02-22 RX ORDER — ELECTROLYTE SOLUTION,INJ
1 VIAL (ML) INTRAVENOUS
Refills: 0 | Status: DISCONTINUED | OUTPATIENT
Start: 2020-02-22 | End: 2020-02-22

## 2020-02-22 RX ORDER — MAGNESIUM SULFATE 500 MG/ML
2 VIAL (ML) INJECTION ONCE
Refills: 0 | Status: COMPLETED | OUTPATIENT
Start: 2020-02-22 | End: 2020-02-22

## 2020-02-22 RX ADMIN — CHLORHEXIDINE GLUCONATE 1 APPLICATION(S): 213 SOLUTION TOPICAL at 17:43

## 2020-02-22 RX ADMIN — Medication 5 MILLIGRAM(S): at 12:30

## 2020-02-22 RX ADMIN — Medication 3 MILLILITER(S): at 17:40

## 2020-02-22 RX ADMIN — Medication 3 MILLILITER(S): at 12:31

## 2020-02-22 RX ADMIN — Medication 1 APPLICATION(S): at 06:02

## 2020-02-22 RX ADMIN — Medication 3 MILLILITER(S): at 06:08

## 2020-02-22 RX ADMIN — I.V. FAT EMULSION 10.4 ML/HR: 20 EMULSION INTRAVENOUS at 18:28

## 2020-02-22 RX ADMIN — PANTOPRAZOLE SODIUM 40 MILLIGRAM(S): 20 TABLET, DELAYED RELEASE ORAL at 12:30

## 2020-02-22 RX ADMIN — Medication 1 EACH: at 18:27

## 2020-02-22 RX ADMIN — BUDESONIDE AND FORMOTEROL FUMARATE DIHYDRATE 2 PUFF(S): 160; 4.5 AEROSOL RESPIRATORY (INHALATION) at 06:03

## 2020-02-22 RX ADMIN — Medication 12.5 MILLIGRAM(S): at 17:42

## 2020-02-22 RX ADMIN — Medication 50 GRAM(S): at 12:44

## 2020-02-22 RX ADMIN — Medication 1 APPLICATION(S): at 17:41

## 2020-02-22 RX ADMIN — I.V. FAT EMULSION 10.4 ML/HR: 20 EMULSION INTRAVENOUS at 18:26

## 2020-02-22 RX ADMIN — BUDESONIDE AND FORMOTEROL FUMARATE DIHYDRATE 2 PUFF(S): 160; 4.5 AEROSOL RESPIRATORY (INHALATION) at 17:40

## 2020-02-22 RX ADMIN — Medication 600 MILLIGRAM(S): at 17:42

## 2020-02-22 RX ADMIN — ENOXAPARIN SODIUM 40 MILLIGRAM(S): 100 INJECTION SUBCUTANEOUS at 12:24

## 2020-02-22 RX ADMIN — Medication 3 MILLILITER(S): at 01:19

## 2020-02-22 RX ADMIN — Medication 1 APPLICATION(S): at 17:42

## 2020-02-22 RX ADMIN — Medication 600 MILLIGRAM(S): at 06:07

## 2020-02-22 RX ADMIN — Medication 975 MILLIGRAM(S): at 00:00

## 2020-02-22 NOTE — PROGRESS NOTE ADULT - SUBJECTIVE AND OBJECTIVE BOX
[facility] NUTRITION SUPPORT / TPN -- FOLLOW UP NOTE  --------------------------------------------------------------------------------    24 hour events/subjective:  No acute overnight events.  Tolerating small amounts of food (2 ensure clear, 1 ensure enlive yesterday).    +2 loose BM's yesterday.  Pain controlled.  Denies N/V.  Remains on half strength TPN.    Diet:  Diet, Low Fiber:   Supplement Feeding Modality:  Oral  Ensure Clear Cans or Servings Per Day:  1       Frequency:  Three Times a day  Ensure Enlive Cans or Servings Per Day:  1       Frequency:  Three Times a day (02-19-20 @ 10:36)      PAST HISTORY  --------------------------------------------------------------------------------  No significant changes to PMH, PSH, FHx, SHx, unless otherwise noted    ALLERGIES & MEDICATIONS  --------------------------------------------------------------------------------  Allergies    IV Contrast (Hives)    Intolerances      Standing Inpatient Medications  albuterol/ipratropium for Nebulization. 3 milliLiter(s) Nebulizer every 6 hours  budesonide 160 MICROgram(s)/formoterol 4.5 MICROgram(s) Inhaler 2 Puff(s) Inhalation two times a day  chlorhexidine 2% Cloths 1 Application(s) Topical <User Schedule>  chlorhexidine 4% Liquid 1 Application(s) Topical <User Schedule>  clobetasol 0.05% Ointment 1 Application(s) Topical two times a day  dronabinol 5 milliGRAM(s) Oral daily  enoxaparin Injectable 40 milliGRAM(s) SubCutaneous daily  ergocalciferol 47232 Unit(s) Oral every week  fat emulsion (Fish Oil and Plant Based) 20% Infusion 10.4 mL/Hr IV Continuous <Continuous>  guaiFENesin  milliGRAM(s) Oral every 12 hours  magnesium sulfate  IVPB 2 Gram(s) IV Intermittent once  metoprolol tartrate 12.5 milliGRAM(s) Oral every 12 hours  pantoprazole   Suspension 40 milliGRAM(s) Oral daily  Parenteral Nutrition - Adult 1 Each TPN Continuous <Continuous>  tamsulosin 0.4 milliGRAM(s) Oral at bedtime  triamcinolone 0.1% Ointment 1 Application(s) Topical two times a day    PRN Inpatient Medications  acetaminophen   Tablet .. 975 milliGRAM(s) Oral every 6 hours PRN  oxyCODONE    IR 5 milliGRAM(s) Oral every 4 hours PRN      VITALS/PHYSICAL EXAM  --------------------------------------------------------------------------------  T(C): 37.2 (02-22-20 @ 09:40), Max: 37.2 (02-21-20 @ 17:22)  HR: 91 (02-22-20 @ 09:40) (68 - 95)  BP: 125/75 (02-22-20 @ 09:40) (104/62 - 125/75)  RR: 18 (02-22-20 @ 09:40) (18 - 18)  SpO2: 99% (02-22-20 @ 09:40) (95% - 99%)  Wt(kg): --        02-21-20 @ 07:01  -  02-22-20 @ 07:00  --------------------------------------------------------  IN: 1290 mL / OUT: 2000 mL / NET: -710 mL    02-22-20 @ 07:01  -  02-22-20 @ 10:09  --------------------------------------------------------  IN: 1360.8 mL / OUT: 550 mL / NET: 810.8 mL        Physical Exam:    	Gen: NAD, A&Ox3  	HEENT: NC/AT, PERRL              Neck, trachea midline              Chest: non labored breathing. equal chest expansion b/l.              Abd: softly distended, nontender, incisions with dressings C/D/I              MSK/Vascular: FAROM x4, Equally Warm,  no edema, no clubbing, cyanosis  	Neuro: No focal deficits, grossly intact sensation, & strength   	Psych: Normal affect and mood              Skin: good turgor              RUE PICC dressing C/D/I          LABS/STUDIES  --------------------------------------------------------------------------------              7.9    12.87 >-----------<  418      [02-21-20 @ 07:01]              25.5     131  |  97  |  17  ----------------------------<  102      [02-22-20 @ 07:06]  4.3   |  24  |  0.43        Ca     8.8     [02-22-20 @ 07:06]      iCa    1.27     [02-22 @ 07:30]      Mg     1.8     [02-22-20 @ 07:06]      Phos  4.0     [02-22-20 @ 07:06]    TPro  7.2  /  Alb  2.9  /  TBili  0.3  /  DBili  x   /  AST  30  /  ALT  94  /  AlkPhos  128  [02-22-20 @ 07:06]          Ca ionized  Creatinine Trend:  POC glucoseGlucose, Serum: 102 mg/dL (02-22-20 @ 07:06)  CAPILLARY BLOOD GLUCOSE        PrealbuminPrealbumin, Serum: 19 mg/dL (02-20-20 @ 09:06)  Prealbumin, Serum: 16 mg/dL (02-15-20 @ 12:30)  Prealbumin, Serum: 17 mg/dL (02-10-20 @ 09:15)  Prealbumin, Serum: 21 mg/dL (02-03-20 @ 23:50)  Prealbumin, Serum: 19 mg/dL (02-03-20 @ 07:18)    Triglycerides

## 2020-02-22 NOTE — PROGRESS NOTE ADULT - ASSESSMENT
74 y/o M presenting with septic shock and high grade SBO s/p exploratory laparotomy, lysis of adhesions, decompression of bowel via enterotomy w/ primary repair, and Abthera VAC placement on 12/6; s/p take down of Abthera, washout and re-application of the Abthera vac on 12/8. 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 s/p L chest tube placement by IR 12/30 for pleural effusion now s/p VATS, with chest tube insertion for drainage of pleural effusion. RRT called 1/13 AM for hypoxia, patient transferred back to SICU.  Patient continued SOB, chest drain possible obstruction expanding. Patent taken back to OR emergently 1/15 for clot evac and VATS. Patient is now s/p Ileostomy reversal 2/4/20. Post-op c/b bloody BM and anemia requiring blood transfusion, now improved. Now tolerating regular diet with TPN.  -cont low fiber diet with supplements between meals  - cont TPN until able to maintain nutritional status via PO intake  -cont TPN at half goal:  140g dextrose, 60g AA, 25g lipids in 2000 cc total volume  -care per primary team      TPN pager [phone], spectra [phone]  discussed with Dr. Chacko and Dr. MELISSA Siegel 76 y/o M presenting with septic shock and high grade SBO s/p exploratory laparotomy, lysis of adhesions, decompression of bowel via enterotomy w/ primary repair, and Abthera VAC placement on 12/6; s/p take down of Abthera, washout and re-application of the Abthera vac on 12/8. 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 s/p L chest tube placement by IR 12/30 for pleural effusion now s/p VATS, with chest tube insertion for drainage of pleural effusion. RRT called 1/13 AM for hypoxia, patient transferred back to SICU.  Patient continued SOB, chest drain possible obstruction expanding. Patent taken back to OR emergently 1/15 for clot evac and VATS. Patient is now s/p Ileostomy reversal 2/4/20. Post-op c/b bloody BM and anemia requiring blood transfusion, now improved. Now tolerating regular diet with TPN.  -cont low fiber diet with supplements between meals  - cont TPN until able to maintain nutritional status via PO intake  -cont TPN at half goal:  140g dextrose, 60g AA, 25g lipids in 2000 cc total volume  -?absorption - pt may ultimately need to go to rehab with TPN  -care per primary team      TPN pager [phone], spectra [phone]  discussed with Dr. Chacko and Dr. MELISSA Siegel 74 y/o M presenting with septic shock and high grade SBO s/p exploratory laparotomy, lysis of adhesions, decompression of bowel via enterotomy w/ primary repair, and Abthera VAC placement on 12/6; s/p take down of Abthera, washout and re-application of the Abthera vac on 12/8. 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 s/p L chest tube placement by IR 12/30 for pleural effusion now s/p VATS, with chest tube insertion for drainage of pleural effusion. RRT called 1/13 AM for hypoxia, patient transferred back to SICU.  Patient continued SOB, chest drain possible obstruction expanding. Patent taken back to OR emergently 1/15 for clot evac and VATS. Patient is now s/p Ileostomy reversal 2/4/20. Post-op c/b bloody BM and anemia requiring blood transfusion, now improved. Now tolerating regular diet with TPN.  -cont low fiber diet with supplements between meals  - cont TPN until able to maintain nutritional status via PO intake  -cont TPN at half goal:  140g dextrose, 60g AA, 25g lipids in 2000 cc total volume  -hypomagnesemia - cont MgSO4 16meq and recheck in AM  -?absorption - pt may ultimately need to go to rehab with TPN  -care per primary team      TPN pager [phone], spectra [phone]  discussed with Dr. Chacko and Dr. MELISSA Siegel 76 y/o M presenting with septic shock and high grade SBO s/p exploratory laparotomy, lysis of adhesions, decompression of bowel via enterotomy w/ primary repair, and Abthera VAC placement on 12/6; s/p take down of Abthera, washout and re-application of the Abthera vac on 12/8. 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 s/p L chest tube placement by IR 12/30 for pleural effusion now s/p VATS, with chest tube insertion for drainage of pleural effusion. RRT called 1/13 AM for hypoxia, patient transferred back to SICU.  Patient continued SOB, chest drain possible obstruction expanding. Patent taken back to OR emergently 1/15 for clot evac and VATS. Patient is now s/p Ileostomy reversal 2/4/20. Post-op c/b bloody BM and anemia requiring blood transfusion, now improved. Now tolerating regular diet with TPN.  -cont low fiber diet with supplements between meals  -cont TPN at half goal:  140g dextrose, 60g AA, 25g lipids in 2000 cc total volume  -hypomagnesemia - cont MgSO4 16meq and recheck in AM  -?absorption - pt may ultimately need to go to rehab with TPN  -care per primary team      TPN pager [phone], spectra [phone]  discussed with Dr. Chacko and Dr. MELISSA Siegel

## 2020-02-22 NOTE — PROGRESS NOTE ADULT - ATTENDING COMMENTS
Patient seen and examined  Continue diet as tolerated  Wean TPN as tolerated  Continue to work with PT

## 2020-02-22 NOTE — PROGRESS NOTE ADULT - ASSESSMENT
76 y/o M presenting with septic shock and high grade SBO s/p exploratory laparotomy, lysis of adhesions, decompression of bowel via enterotomy w/ primary repair, and Abthera VAC placement on 12/6; s/p take down of Abthera, washout and re-application of the Abthera vac on 12/8. 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 s/p L chest tube placement by IR 12/30 for pleural effusion now s/p VATS, with chest tube insertion for drainage of pleural effusion. RRT called 1/13 AM for hypoxia, patient transferred back to SICU.  Patient continued SOB, chest drain possible obstruction expanding. Patent taken back to OR emergently 1/15 for clot evac and VATS. Patient is now s/p Ileostomy reversal 2/4/20. Post-op c/b bloody BM and anemia requiring blood transfusion, now improved. Now tolerating regular diet.    PLAN:  - C/w Marinol for appetite stimulation  - Continue regular diet  - Wean TPN  - BID dressing changes (minimal gauze and tape)  - Monitor BM frequency and appearance  - Pain control as needed  - OOB with PT  - Dc planning for rehab  - Appreciate cv/pulm f/u   - Appreciate Dermatology eval/recs: Clobetasol 0.05% BID for 2 weeks. Outpatient follow up in 2 weeks on Tuesday mornings ([phone])    [facility]  [id number]

## 2020-02-22 NOTE — PROGRESS NOTE ADULT - SUBJECTIVE AND OBJECTIVE BOX
Subjective: Patient seen and examined. No new events except as noted.     REVIEW OF SYSTEMS:    CONSTITUTIONAL: No weakness, fevers or chills  EYES/ENT: No visual changes;  No vertigo or throat pain   NECK: No pain or stiffness  RESPIRATORY: No cough, wheezing, hemoptysis; No shortness of breath  CARDIOVASCULAR: No chest pain or palpitations  GASTROINTESTINAL: No abdominal or epigastric pain. No nausea, vomiting, or hematemesis; No diarrhea or constipation. No melena or hematochezia.  GENITOURINARY: No dysuria, frequency or hematuria  NEUROLOGICAL: No numbness or weakness  SKIN: No itching, burning, rashes, or lesions   All other review of systems is negative unless indicated above.    MEDICATIONS:  MEDICATIONS  (STANDING):  albuterol/ipratropium for Nebulization. 3 milliLiter(s) Nebulizer every 6 hours  budesonide 160 MICROgram(s)/formoterol 4.5 MICROgram(s) Inhaler 2 Puff(s) Inhalation two times a day  chlorhexidine 2% Cloths 1 Application(s) Topical <User Schedule>  chlorhexidine 4% Liquid 1 Application(s) Topical <User Schedule>  clobetasol 0.05% Ointment 1 Application(s) Topical two times a day  dronabinol 5 milliGRAM(s) Oral daily  enoxaparin Injectable 40 milliGRAM(s) SubCutaneous daily  ergocalciferol 66652 Unit(s) Oral every week  fat emulsion (Fish Oil and Plant Based) 20% Infusion 10.4 mL/Hr (10.4 mL/Hr) IV Continuous <Continuous>  guaiFENesin  milliGRAM(s) Oral every 12 hours  metoprolol tartrate 12.5 milliGRAM(s) Oral every 12 hours  pantoprazole   Suspension 40 milliGRAM(s) Oral daily  Parenteral Nutrition - Adult 1 Each (83 mL/Hr) TPN Continuous <Continuous>  tamsulosin 0.4 milliGRAM(s) Oral at bedtime  triamcinolone 0.1% Ointment 1 Application(s) Topical two times a day      PHYSICAL EXAM:  T(C): 37.1 (02-22-20 @ 17:00), Max: 37.2 (02-22-20 @ 09:40)  HR: 90 (02-22-20 @ 17:00) (79 - 95)  BP: 121/71 (02-22-20 @ 17:00) (107/58 - 125/75)  RR: 18 (02-22-20 @ 17:00) (18 - 18)  SpO2: 96% (02-22-20 @ 17:00) (96% - 99%)  Wt(kg): --  I&O's Summary    21 Feb 2020 07:01  -  22 Feb 2020 07:00  --------------------------------------------------------  IN: 1290 mL / OUT: 2000 mL / NET: -710 mL    22 Feb 2020 07:01  -  22 Feb 2020 20:01  --------------------------------------------------------  IN: 1800.8 mL / OUT: 1200 mL / NET: 600.8 mL          Appearance: Normal	  HEENT:   Normal oral mucosa, PERRL, EOMI	  Lymphatic: No lymphadenopathy , no edema  Cardiovascular: Normal S1 S2, No JVD, No murmurs , Peripheral pulses palpable 2+ bilaterally  Respiratory: Lungs clear to auscultation, normal effort 	  Gastrointestinal:  Soft, Non-tender, + BS	  Skin: No rashes, No ecchymoses, No cyanosis, warm to touch  Musculoskeletal: Normal range of motion, normal strength  Psychiatry:  Mood & affect appropriate  Ext: No edema      LABS:    CARDIAC MARKERS:                                7.9    12.87 )-----------( 418      ( 21 Feb 2020 07:01 )             25.5     02-22    131<L>  |  97  |  17  ----------------------------<  102<H>  4.3   |  24  |  0.43<L>    Ca    8.8      22 Feb 2020 07:06  Phos  4.0     02-22  Mg     1.8     02-22    TPro  7.2  /  Alb  2.9<L>  /  TBili  0.3  /  DBili  x   /  AST  30  /  ALT  94<H>  /  AlkPhos  128<H>  02-22    proBNP:   Lipid Profile:   HgA1c:   TSH:             TELEMETRY: 	    ECG:  	  RADIOLOGY:   DIAGNOSTIC TESTING:  [ ] Echocardiogram:  [ ]  Catheterization:  [ ] Stress Test:    OTHER:

## 2020-02-22 NOTE — PROGRESS NOTE ADULT - SUBJECTIVE AND OBJECTIVE BOX
Surgery Daily Progress Note    SUBJECTIVE:  - Patient seen and examined at bedside   - Patient states feeling well  - Denies abdominal pain, N/V, chest pain, SOB  --------------------------------------------------------------------------------------------------  OBJECTIVE:   Physical Exam:  General: AAOx3, NAD, lying comfortably in bed  HEENT: NC/AT  Respiratory: nonlabored breathing  Cardiovascular: RRR, normal S1 and S2, no murmurs or gallops  Abdomen: non-distended, soft, non-tender, dressings with minimal purulent stainings, dressings changed in am rounds  Extremities: WWP, no edema  --------------------------------------------------------------------------------------------------  V/S:  Vital Signs Last 24 Hrs  T(C): 36.7 (22 Feb 2020 12:28), Max: 37.2 (21 Feb 2020 17:22)  T(F): 98 (22 Feb 2020 12:28), Max: 99 (21 Feb 2020 17:22)  HR: 91 (22 Feb 2020 12:28) (79 - 95)  BP: 115/58 (22 Feb 2020 12:28) (107/58 - 125/75)  BP(mean): --  RR: 18 (22 Feb 2020 12:28) (18 - 18)  SpO2: 96% (22 Feb 2020 12:28) (95% - 99%)    --------------------------------------------------------------------------------------------------  I/Os:    21 Feb 2020 07:01  -  22 Feb 2020 07:00  --------------------------------------------------------  IN:    Oral Fluid: 1290 mL  Total IN: 1290 mL    OUT:    Voided: 2000 mL  Total OUT: 2000 mL    Total NET: -710 mL      22 Feb 2020 07:01  -  22 Feb 2020 14:29  --------------------------------------------------------  IN:    fat emulsion (Fish Oil and Plant Based) 20% Infusion: 124.8 mL    Oral Fluid: 440 mL    TPN (Total Parenteral Nutrition): 996 mL  Total IN: 1560.8 mL    OUT:    Voided: 750 mL  Total OUT: 750 mL    Total NET: 810.8 mL        --------------------------------------------------------------------------------------------------  LABS:                        7.9    12.87 )-----------( 418      ( 21 Feb 2020 07:01 )             25.5     22 Feb 2020 07:06    131    |  97     |  17     ----------------------------<  102    4.3     |  24     |  0.43     Ca    8.8        22 Feb 2020 07:06  Phos  4.0       22 Feb 2020 07:06  Mg     1.8       22 Feb 2020 07:06    TPro  7.2    /  Alb  2.9    /  TBili  0.3    /  DBili  x      /  AST  30     /  ALT  94     /  AlkPhos  128    22 Feb 2020 07:06      CAPILLARY BLOOD GLUCOSE            LIVER FUNCTIONS - ( 22 Feb 2020 07:06 )  Alb: 2.9 g/dL / Pro: 7.2 g/dL / ALK PHOS: 128 U/L / ALT: 94 U/L / AST: 30 U/L / GGT: x               --------------------------------------------------------------------------------------------------  MEDICATIONS  (STANDING):  albuterol/ipratropium for Nebulization. 3 milliLiter(s) Nebulizer every 6 hours  budesonide 160 MICROgram(s)/formoterol 4.5 MICROgram(s) Inhaler 2 Puff(s) Inhalation two times a day  chlorhexidine 2% Cloths 1 Application(s) Topical <User Schedule>  chlorhexidine 4% Liquid 1 Application(s) Topical <User Schedule>  clobetasol 0.05% Ointment 1 Application(s) Topical two times a day  dronabinol 5 milliGRAM(s) Oral daily  enoxaparin Injectable 40 milliGRAM(s) SubCutaneous daily  ergocalciferol 75162 Unit(s) Oral every week  fat emulsion (Fish Oil and Plant Based) 20% Infusion 10.4 mL/Hr (10.4 mL/Hr) IV Continuous <Continuous>  guaiFENesin  milliGRAM(s) Oral every 12 hours  metoprolol tartrate 12.5 milliGRAM(s) Oral every 12 hours  pantoprazole   Suspension 40 milliGRAM(s) Oral daily  Parenteral Nutrition - Adult 1 Each (83 mL/Hr) TPN Continuous <Continuous>  Parenteral Nutrition - Adult 1 Each (83 mL/Hr) TPN Continuous <Continuous>  tamsulosin 0.4 milliGRAM(s) Oral at bedtime  triamcinolone 0.1% Ointment 1 Application(s) Topical two times a day    MEDICATIONS  (PRN):  acetaminophen   Tablet .. 975 milliGRAM(s) Oral every 6 hours PRN Mild Pain (1 - 3)  oxyCODONE    IR 5 milliGRAM(s) Oral every 4 hours PRN Moderate Pain (4 - 6)

## 2020-02-22 NOTE — PROGRESS NOTE ADULT - ATTENDING COMMENTS
seen and examined 01-22-20 @ 1025    tolerating regular diet w/ Ensure  no nausea or vomiting  loose stools    soft / NT / ND  incision partially closed, epigastric area remains open and granulated  no pedal edema    12/6 - 12/10/2019 - staged ileocolic resection (included 165 cm of small bowel) with end ileostomy and mucous fistula for strangulated SBO  2/4/2020 - ileostomy reversal    severe protein-calorie malnutrition secondary to short bowel syndrome  -continue TPN at half goal    mild metabolic alkalosis  -continue NaAc 0 mEq    hypocalcemia  -continue CaGluc 16 mEq    hypomagnesemia  -continue MgSulf 16 mEq seen and examined 01-22-20 @ 1025    tolerating regular diet w/ Ensure  no nausea or vomiting  loose stools    soft / NT / ND  incision partially closed, epigastric area remains open and granulated  no pedal edema    12/6 - 12/10/2019 - staged ileocolic resection (included 165 cm of small bowel) with end ileostomy and mucous fistula for strangulated SBO  2/4/2020 - ileostomy reversal    severe protein-calorie malnutrition secondary to short bowel syndrome  -continue TPN at half goal    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

## 2020-02-23 LAB
ALBUMIN SERPL ELPH-MCNC: 2.5 G/DL — LOW (ref 3.3–5)
ALP SERPL-CCNC: 112 U/L — SIGNIFICANT CHANGE UP (ref 40–120)
ALT FLD-CCNC: 75 U/L — HIGH (ref 10–45)
ANION GAP SERPL CALC-SCNC: 10 MMOL/L — SIGNIFICANT CHANGE UP (ref 5–17)
ANION GAP SERPL CALC-SCNC: 9 MMOL/L — SIGNIFICANT CHANGE UP (ref 5–17)
AST SERPL-CCNC: 23 U/L — SIGNIFICANT CHANGE UP (ref 10–40)
BILIRUB SERPL-MCNC: 0.3 MG/DL — SIGNIFICANT CHANGE UP (ref 0.2–1.2)
BUN SERPL-MCNC: 17 MG/DL — SIGNIFICANT CHANGE UP (ref 7–23)
BUN SERPL-MCNC: 18 MG/DL — SIGNIFICANT CHANGE UP (ref 7–23)
CA-I BLD-SCNC: 1.2 MMOL/L — SIGNIFICANT CHANGE UP (ref 1.12–1.3)
CA-I BLD-SCNC: 1.32 MMOL/L — HIGH (ref 1.12–1.3)
CALCIUM SERPL-MCNC: 8.9 MG/DL — SIGNIFICANT CHANGE UP (ref 8.4–10.5)
CALCIUM SERPL-MCNC: 9.5 MG/DL — SIGNIFICANT CHANGE UP (ref 8.4–10.5)
CHLORIDE SERPL-SCNC: 95 MMOL/L — LOW (ref 96–108)
CHLORIDE SERPL-SCNC: 99 MMOL/L — SIGNIFICANT CHANGE UP (ref 96–108)
CO2 SERPL-SCNC: 20 MMOL/L — LOW (ref 22–31)
CO2 SERPL-SCNC: 24 MMOL/L — SIGNIFICANT CHANGE UP (ref 22–31)
CREAT SERPL-MCNC: 0.46 MG/DL — LOW (ref 0.5–1.3)
CREAT SERPL-MCNC: 0.49 MG/DL — LOW (ref 0.5–1.3)
GLUCOSE SERPL-MCNC: 600 MG/DL — CRITICAL HIGH (ref 70–99)
GLUCOSE SERPL-MCNC: 95 MG/DL — SIGNIFICANT CHANGE UP (ref 70–99)
HCT VFR BLD CALC: 23.8 % — LOW (ref 39–50)
HCT VFR BLD CALC: 25.3 % — LOW (ref 39–50)
HGB BLD-MCNC: 7.3 G/DL — LOW (ref 13–17)
HGB BLD-MCNC: 8.2 G/DL — LOW (ref 13–17)
MAGNESIUM SERPL-MCNC: 1.9 MG/DL — SIGNIFICANT CHANGE UP (ref 1.6–2.6)
MAGNESIUM SERPL-MCNC: 2.7 MG/DL — HIGH (ref 1.6–2.6)
MCHC RBC-ENTMCNC: 29.6 PG — SIGNIFICANT CHANGE UP (ref 27–34)
MCHC RBC-ENTMCNC: 29.7 PG — SIGNIFICANT CHANGE UP (ref 27–34)
MCHC RBC-ENTMCNC: 30.7 GM/DL — LOW (ref 32–36)
MCHC RBC-ENTMCNC: 32.4 GM/DL — SIGNIFICANT CHANGE UP (ref 32–36)
MCV RBC AUTO: 91.7 FL — SIGNIFICANT CHANGE UP (ref 80–100)
MCV RBC AUTO: 96.4 FL — SIGNIFICANT CHANGE UP (ref 80–100)
NRBC # BLD: 0 /100 WBCS — SIGNIFICANT CHANGE UP (ref 0–0)
NRBC # BLD: 0 /100 WBCS — SIGNIFICANT CHANGE UP (ref 0–0)
PHOSPHATE SERPL-MCNC: 4.1 MG/DL — SIGNIFICANT CHANGE UP (ref 2.5–4.5)
PHOSPHATE SERPL-MCNC: 5.6 MG/DL — HIGH (ref 2.5–4.5)
PLATELET # BLD AUTO: 376 K/UL — SIGNIFICANT CHANGE UP (ref 150–400)
PLATELET # BLD AUTO: 400 K/UL — SIGNIFICANT CHANGE UP (ref 150–400)
POTASSIUM SERPL-MCNC: 4.4 MMOL/L — SIGNIFICANT CHANGE UP (ref 3.5–5.3)
POTASSIUM SERPL-MCNC: 8 MMOL/L — CRITICAL HIGH (ref 3.5–5.3)
POTASSIUM SERPL-SCNC: 4.4 MMOL/L — SIGNIFICANT CHANGE UP (ref 3.5–5.3)
POTASSIUM SERPL-SCNC: 8 MMOL/L — CRITICAL HIGH (ref 3.5–5.3)
PROT SERPL-MCNC: 6.5 G/DL — SIGNIFICANT CHANGE UP (ref 6–8.3)
RBC # BLD: 2.47 M/UL — LOW (ref 4.2–5.8)
RBC # BLD: 2.76 M/UL — LOW (ref 4.2–5.8)
RBC # FLD: 14 % — SIGNIFICANT CHANGE UP (ref 10.3–14.5)
RBC # FLD: 14.1 % — SIGNIFICANT CHANGE UP (ref 10.3–14.5)
SODIUM SERPL-SCNC: 124 MMOL/L — LOW (ref 135–145)
SODIUM SERPL-SCNC: 133 MMOL/L — LOW (ref 135–145)
WBC # BLD: 8.08 K/UL — SIGNIFICANT CHANGE UP (ref 3.8–10.5)
WBC # BLD: 8.4 K/UL — SIGNIFICANT CHANGE UP (ref 3.8–10.5)
WBC # FLD AUTO: 8.08 K/UL — SIGNIFICANT CHANGE UP (ref 3.8–10.5)
WBC # FLD AUTO: 8.4 K/UL — SIGNIFICANT CHANGE UP (ref 3.8–10.5)

## 2020-02-23 PROCEDURE — 99233 SBSQ HOSP IP/OBS HIGH 50: CPT

## 2020-02-23 RX ORDER — ELECTROLYTE SOLUTION,INJ
1 VIAL (ML) INTRAVENOUS
Refills: 0 | Status: DISCONTINUED | OUTPATIENT
Start: 2020-02-23 | End: 2020-02-23

## 2020-02-23 RX ORDER — I.V. FAT EMULSION 20 G/100ML
10.4 EMULSION INTRAVENOUS
Qty: 25 | Refills: 0 | Status: DISCONTINUED | OUTPATIENT
Start: 2020-02-23 | End: 2020-02-24

## 2020-02-23 RX ADMIN — BUDESONIDE AND FORMOTEROL FUMARATE DIHYDRATE 2 PUFF(S): 160; 4.5 AEROSOL RESPIRATORY (INHALATION) at 05:31

## 2020-02-23 RX ADMIN — Medication 3 MILLILITER(S): at 00:31

## 2020-02-23 RX ADMIN — Medication 1 APPLICATION(S): at 17:38

## 2020-02-23 RX ADMIN — Medication 3 MILLILITER(S): at 12:45

## 2020-02-23 RX ADMIN — Medication 600 MILLIGRAM(S): at 05:31

## 2020-02-23 RX ADMIN — OXYCODONE HYDROCHLORIDE 5 MILLIGRAM(S): 5 TABLET ORAL at 01:00

## 2020-02-23 RX ADMIN — PANTOPRAZOLE SODIUM 40 MILLIGRAM(S): 20 TABLET, DELAYED RELEASE ORAL at 12:44

## 2020-02-23 RX ADMIN — Medication 1 APPLICATION(S): at 05:31

## 2020-02-23 RX ADMIN — Medication 975 MILLIGRAM(S): at 23:06

## 2020-02-23 RX ADMIN — I.V. FAT EMULSION 10.4 ML/HR: 20 EMULSION INTRAVENOUS at 17:38

## 2020-02-23 RX ADMIN — TAMSULOSIN HYDROCHLORIDE 0.4 MILLIGRAM(S): 0.4 CAPSULE ORAL at 21:13

## 2020-02-23 RX ADMIN — ENOXAPARIN SODIUM 40 MILLIGRAM(S): 100 INJECTION SUBCUTANEOUS at 12:45

## 2020-02-23 RX ADMIN — Medication 1 APPLICATION(S): at 05:30

## 2020-02-23 RX ADMIN — Medication 975 MILLIGRAM(S): at 23:36

## 2020-02-23 RX ADMIN — Medication 5 MILLIGRAM(S): at 12:44

## 2020-02-23 RX ADMIN — BUDESONIDE AND FORMOTEROL FUMARATE DIHYDRATE 2 PUFF(S): 160; 4.5 AEROSOL RESPIRATORY (INHALATION) at 17:38

## 2020-02-23 RX ADMIN — CHLORHEXIDINE GLUCONATE 1 APPLICATION(S): 213 SOLUTION TOPICAL at 11:37

## 2020-02-23 RX ADMIN — Medication 3 MILLILITER(S): at 05:43

## 2020-02-23 RX ADMIN — OXYCODONE HYDROCHLORIDE 5 MILLIGRAM(S): 5 TABLET ORAL at 00:30

## 2020-02-23 RX ADMIN — Medication 12.5 MILLIGRAM(S): at 17:39

## 2020-02-23 RX ADMIN — Medication 975 MILLIGRAM(S): at 00:30

## 2020-02-23 RX ADMIN — Medication 600 MILLIGRAM(S): at 17:39

## 2020-02-23 RX ADMIN — OXYCODONE HYDROCHLORIDE 5 MILLIGRAM(S): 5 TABLET ORAL at 23:37

## 2020-02-23 RX ADMIN — ERGOCALCIFEROL 50000 UNIT(S): 1.25 CAPSULE ORAL at 12:44

## 2020-02-23 RX ADMIN — Medication 975 MILLIGRAM(S): at 01:00

## 2020-02-23 RX ADMIN — OXYCODONE HYDROCHLORIDE 5 MILLIGRAM(S): 5 TABLET ORAL at 23:07

## 2020-02-23 RX ADMIN — Medication 3 MILLILITER(S): at 17:38

## 2020-02-23 RX ADMIN — Medication 1 APPLICATION(S): at 15:56

## 2020-02-23 RX ADMIN — Medication 3 MILLILITER(S): at 23:08

## 2020-02-23 RX ADMIN — TAMSULOSIN HYDROCHLORIDE 0.4 MILLIGRAM(S): 0.4 CAPSULE ORAL at 00:31

## 2020-02-23 RX ADMIN — Medication 1 EACH: at 17:38

## 2020-02-23 NOTE — PROGRESS NOTE ADULT - SUBJECTIVE AND OBJECTIVE BOX
Subjective: Patient seen and examined. No new events except as noted.     REVIEW OF SYSTEMS:    CONSTITUTIONAL: No weakness, fevers or chills  EYES/ENT: No visual changes;  No vertigo or throat pain   NECK: No pain or stiffness  RESPIRATORY: No cough, wheezing, hemoptysis; No shortness of breath  CARDIOVASCULAR: No chest pain or palpitations  GASTROINTESTINAL: No abdominal or epigastric pain. No nausea, vomiting, or hematemesis; No diarrhea or constipation. No melena or hematochezia.  GENITOURINARY: No dysuria, frequency or hematuria  NEUROLOGICAL: No numbness or weakness  SKIN: No itching, burning, rashes, or lesions   All other review of systems is negative unless indicated above.    MEDICATIONS:  MEDICATIONS  (STANDING):  albuterol/ipratropium for Nebulization. 3 milliLiter(s) Nebulizer every 6 hours  budesonide 160 MICROgram(s)/formoterol 4.5 MICROgram(s) Inhaler 2 Puff(s) Inhalation two times a day  chlorhexidine 2% Cloths 1 Application(s) Topical <User Schedule>  chlorhexidine 4% Liquid 1 Application(s) Topical <User Schedule>  clobetasol 0.05% Ointment 1 Application(s) Topical two times a day  dronabinol 5 milliGRAM(s) Oral daily  enoxaparin Injectable 40 milliGRAM(s) SubCutaneous daily  ergocalciferol 32277 Unit(s) Oral every week  fat emulsion (Fish Oil and Plant Based) 20% Infusion 10.4 mL/Hr (10.4 mL/Hr) IV Continuous <Continuous>  guaiFENesin  milliGRAM(s) Oral every 12 hours  metoprolol tartrate 12.5 milliGRAM(s) Oral every 12 hours  pantoprazole   Suspension 40 milliGRAM(s) Oral daily  Parenteral Nutrition - Adult 1 Each (83 mL/Hr) TPN Continuous <Continuous>  Parenteral Nutrition - Adult 1 Each (50 mL/Hr) TPN Continuous <Continuous>  tamsulosin 0.4 milliGRAM(s) Oral at bedtime  triamcinolone 0.1% Ointment 1 Application(s) Topical two times a day      PHYSICAL EXAM:  T(C): 36.7 (02-23-20 @ 09:23), Max: 37.2 (02-23-20 @ 05:09)  HR: 91 (02-23-20 @ 09:23) (88 - 96)  BP: 107/63 (02-23-20 @ 09:23) (100/58 - 123/69)  RR: 18 (02-23-20 @ 09:23) (18 - 18)  SpO2: 96% (02-23-20 @ 09:23) (95% - 97%)  Wt(kg): --  I&O's Summary    22 Feb 2020 07:01  -  23 Feb 2020 07:00  --------------------------------------------------------  IN: 3792.8 mL / OUT: 1800 mL / NET: 1992.8 mL    23 Feb 2020 07:01  -  23 Feb 2020 11:08  --------------------------------------------------------  IN: 240 mL / OUT: 250 mL / NET: -10 mL          Appearance: Normal	  HEENT:   Normal oral mucosa, PERRL, EOMI	  Lymphatic: No lymphadenopathy , no edema  Cardiovascular: Normal S1 S2, No JVD, No murmurs , Peripheral pulses palpable 2+ bilaterally  Respiratory: Lungs clear to auscultation, normal effort 	  Gastrointestinal:  Soft, Non-tender, + BS	  Skin: No rashes, No ecchymoses, No cyanosis, warm to touch  Musculoskeletal: Normal range of motion, normal strength  Psychiatry:  Mood & affect appropriate  Ext: No edema      LABS:    CARDIAC MARKERS:                                8.2    8.40  )-----------( 400      ( 23 Feb 2020 09:18 )             25.3     02-23    133<L>  |  99  |  18  ----------------------------<  95  4.4   |  24  |  0.46<L>    Ca    8.9      23 Feb 2020 09:18  Phos  4.1     02-23  Mg     1.9     02-23    TPro  6.5  /  Alb  2.5<L>  /  TBili  0.3  /  DBili  x   /  AST  23  /  ALT  75<H>  /  AlkPhos  112  02-23    proBNP:   Lipid Profile:   HgA1c:   TSH:             TELEMETRY: 	    ECG:  	  RADIOLOGY:   DIAGNOSTIC TESTING:  [ ] Echocardiogram:  [ ]  Catheterization:  [ ] Stress Test:    OTHER:

## 2020-02-23 NOTE — PROGRESS NOTE ADULT - PROBLEM SELECTOR PLAN 1
s/p ex lap, MIRYAM, closure of enterotomy, abthera vac placement  s/p washout  s/p Ileostomy reversal    Advance as tolerated   pain control   started PO diet

## 2020-02-23 NOTE — PROGRESS NOTE ADULT - ATTENDING COMMENTS
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

## 2020-02-23 NOTE — PROVIDER CONTACT NOTE (CRITICAL VALUE NOTIFICATION) - ASSESSMENT
A&Ox4. Denies SOB. CT changed to water by MD. Ostomy to appliance + flatus + yellow stool.
alert and oriented x4

## 2020-02-23 NOTE — PROGRESS NOTE ADULT - SUBJECTIVE AND OBJECTIVE BOX
[facility] NUTRITION SUPPORT / TPN -- FOLLOW UP NOTE  --------------------------------------------------------------------------------    24 hour events/subjective:  No acute overnight events.  +2BM's yesterday.  Denies N/V.  Ate >75% of breakfast; feels breakfast is his best meal.  Remains on half dose TPN.  Denies SOB/CP/dizziness/palpitations.    Diet:  Diet, Low Fiber:   Supplement Feeding Modality:  Oral  Ensure Clear Cans or Servings Per Day:  1       Frequency:  Three Times a day  Ensure Enlive Cans or Servings Per Day:  1       Frequency:  Three Times a day (02-19-20 @ 10:36)      PAST HISTORY  --------------------------------------------------------------------------------  No significant changes to PMH, PSH, FHx, SHx, unless otherwise noted    ALLERGIES & MEDICATIONS  --------------------------------------------------------------------------------  Allergies    IV Contrast (Hives)    Intolerances      Standing Inpatient Medications  albuterol/ipratropium for Nebulization. 3 milliLiter(s) Nebulizer every 6 hours  budesonide 160 MICROgram(s)/formoterol 4.5 MICROgram(s) Inhaler 2 Puff(s) Inhalation two times a day  chlorhexidine 2% Cloths 1 Application(s) Topical <User Schedule>  chlorhexidine 4% Liquid 1 Application(s) Topical <User Schedule>  clobetasol 0.05% Ointment 1 Application(s) Topical two times a day  dronabinol 5 milliGRAM(s) Oral daily  enoxaparin Injectable 40 milliGRAM(s) SubCutaneous daily  ergocalciferol 82949 Unit(s) Oral every week  fat emulsion (Fish Oil and Plant Based) 20% Infusion 10.4 mL/Hr IV Continuous <Continuous>  guaiFENesin  milliGRAM(s) Oral every 12 hours  metoprolol tartrate 12.5 milliGRAM(s) Oral every 12 hours  pantoprazole   Suspension 40 milliGRAM(s) Oral daily  Parenteral Nutrition - Adult 1 Each TPN Continuous <Continuous>  tamsulosin 0.4 milliGRAM(s) Oral at bedtime  triamcinolone 0.1% Ointment 1 Application(s) Topical two times a day    PRN Inpatient Medications  acetaminophen   Tablet .. 975 milliGRAM(s) Oral every 6 hours PRN  oxyCODONE    IR 5 milliGRAM(s) Oral every 4 hours PRN      VITALS/PHYSICAL EXAM  --------------------------------------------------------------------------------  T(C): 36.7 (02-23-20 @ 09:23), Max: 37.2 (02-23-20 @ 05:09)  HR: 91 (02-23-20 @ 09:23) (88 - 96)  BP: 107/63 (02-23-20 @ 09:23) (100/58 - 123/69)  RR: 18 (02-23-20 @ 09:23) (18 - 18)  SpO2: 96% (02-23-20 @ 09:23) (95% - 97%)  Wt(kg): --        02-22-20 @ 07:01  -  02-23-20 @ 07:00  --------------------------------------------------------  IN: 3792.8 mL / OUT: 1800 mL / NET: 1992.8 mL    02-23-20 @ 07:01  -  02-23-20 @ 09:51  --------------------------------------------------------  IN: 240 mL / OUT: 250 mL / NET: -10 mL        Physical Exam:    	Gen: NAD, A&Ox3  	HEENT: NC/AT, PERRL              Neck, trachea midline              Chest: non labored breathing. equal chest expansion b/l.              Abd: softly distended, nontender, incisions with dressings C/D/I              MSK/Vascular: FAROM x4, Equally Warm,  no edema, no clubbing, cyanosis  	Neuro: No focal deficits, grossly intact sensation, & strength   	Psych: Normal affect and mood              Skin: good turgor              RUE PICC dressing C/D/I        LABS/STUDIES  --------------------------------------------------------------------------------              8.2    8.40  >-----------<  400      [02-23-20 @ 09:18]              25.3     133  |  99  |  18  ----------------------------<  95      [02-23-20 @ 09:18]  4.4   |  24  |  0.46        Ca     8.9     [02-23-20 @ 09:18]      iCa    1.20     [02-23 @ 09:18]      Mg     1.9     [02-23-20 @ 09:18]      Phos  4.1     [02-23-20 @ 09:18]    TPro  6.5  /  Alb  2.5  /  TBili  0.3  /  DBili  x   /  AST  23  /  ALT  75  /  AlkPhos  112  [02-23-20 @ 07:28]          Ca ionized  Creatinine Trend:  POC glucoseGlucose, Serum: 95 mg/dL (02-23-20 @ 09:18)  Glucose, Serum: 600 mg/dL (02-23-20 @ 07:28)  CAPILLARY BLOOD GLUCOSE        PrealbuminPrealbumin, Serum: 19 mg/dL (02-20-20 @ 09:06)  Prealbumin, Serum: 16 mg/dL (02-15-20 @ 12:30)  Prealbumin, Serum: 17 mg/dL (02-10-20 @ 09:15)  Prealbumin, Serum: 21 mg/dL (02-03-20 @ 23:50)  Prealbumin, Serum: 19 mg/dL (02-03-20 @ 07:18)    Triglycerides

## 2020-02-23 NOTE — PROGRESS NOTE ADULT - ASSESSMENT
74 y/o M presenting with septic shock and high grade SBO s/p exploratory laparotomy, lysis of adhesions, decompression of bowel via enterotomy w/ primary repair, and Abthera VAC placement on 12/6; s/p take down of Abthera, washout and re-application of the Abthera vac on 12/8. 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 s/p L chest tube placement by IR 12/30 for pleural effusion now s/p VATS, with chest tube insertion for drainage of pleural effusion. RRT called 1/13 AM for hypoxia, patient transferred back to SICU.  Patient continued SOB, chest drain possible obstruction expanding. Patent taken back to OR emergently 1/15 for clot evac and VATS. Patient is now s/p Ileostomy reversal 2/4/20. Post-op c/b bloody BM and anemia requiring blood transfusion, now improved. Now tolerating regular diet with TPN.  -cont low fiber diet with supplements between meals  -cont TPN at half goal:  140g dextrose, 60g AA, 25g lipids in 2000 cc total volume  -hypomagnesemia - cont MgSO4 16meq  -?absorption - pt may ultimately need to go to rehab with TPN  -care per primary team      TPN pager [phone], spectra [phone]  discussed with Dr. Chacko and Dr. MELISSA Siegel 76 y/o M presenting with septic shock and high grade SBO s/p exploratory laparotomy, lysis of adhesions, decompression of bowel via enterotomy w/ primary repair, and Abthera VAC placement on 12/6; s/p take down of Abthera, washout and re-application of the Abthera vac on 12/8. 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 s/p L chest tube placement by IR 12/30 for pleural effusion now s/p VATS, with chest tube insertion for drainage of pleural effusion. RRT called 1/13 AM for hypoxia, patient transferred back to SICU.  Patient continued SOB, chest drain possible obstruction expanding. Patent taken back to OR emergently 1/15 for clot evac and VATS. Patient is now s/p Ileostomy reversal 2/4/20. Post-op c/b bloody BM and anemia requiring blood transfusion, now improved. Now tolerating regular diet with TPN.  -cont low fiber diet with supplements between meals  -cont TPN at half goal:  140g dextrose, 60g AA, 25g lipids  - total volume decreased today from 2000cc to 1200 cc total volume  -hypomagnesemia - cont MgSO4 16meq  -?absorption - pt may ultimately need to go to rehab with TPN  -care per primary team      TPN pager [phone], spectra [phone]  discussed with Dr. Chacko and Dr. MELISSA Siegel

## 2020-02-23 NOTE — PROGRESS NOTE ADULT - SUBJECTIVE AND OBJECTIVE BOX
No acute events overnight    SUBJECTIVE:      OBJECTIVE:  Vital Signs Last 24 Hrs  T(C): 37.2 (23 Feb 2020 05:09), Max: 37.2 (22 Feb 2020 09:40)  T(F): 98.9 (23 Feb 2020 05:09), Max: 98.9 (22 Feb 2020 09:40)  HR: 88 (23 Feb 2020 05:09) (88 - 96)  BP: 100/58 (23 Feb 2020 05:09) (100/58 - 125/75)  BP(mean): --  RR: 18 (23 Feb 2020 05:09) (18 - 18)  SpO2: 96% (23 Feb 2020 05:09) (95% - 99%)      Physical Examination:  General: AAOx3, NAD, lying comfortably in bed  HEENT: NC/AT  Respiratory: nonlabored breathing  Cardiovascular: RRR, normal S1 and S2, no murmurs or gallops  Abdomen: non-distended, soft, non-tender, dressings with minimal purulent stainings, dressings changed in am rounds  Extremities: WWP, no edema    LABS:                        7.9    12.87 )-----------( 418      ( 21 Feb 2020 07:01 )             25.5       02-22    131<L>  |  97  |  17  ----------------------------<  102<H>  4.3   |  24  |  0.43<L>    Ca    8.8      22 Feb 2020 07:06  Phos  4.0     02-22  Mg     1.8     02-22    TPro  7.2  /  Alb  2.9<L>  /  TBili  0.3  /  DBili  x   /  AST  30  /  ALT  94<H>  /  AlkPhos  128<H>  02-22 No acute events overnight    SUBJECTIVE:  Patient seen and examined  Patient states feeling well, eating a little bit more than yesterday  Denies abdominal pain, N/V, chest pain, SOB    OBJECTIVE:  Vital Signs Last 24 Hrs  T(C): 37.2 (23 Feb 2020 05:09), Max: 37.2 (22 Feb 2020 09:40)  T(F): 98.9 (23 Feb 2020 05:09), Max: 98.9 (22 Feb 2020 09:40)  HR: 88 (23 Feb 2020 05:09) (88 - 96)  BP: 100/58 (23 Feb 2020 05:09) (100/58 - 125/75)  BP(mean): --  RR: 18 (23 Feb 2020 05:09) (18 - 18)  SpO2: 96% (23 Feb 2020 05:09) (95% - 99%)      Physical Examination:  General: AAOx3, NAD, lying comfortably in bed  HEENT: NC/AT  Respiratory: nonlabored breathing  Cardiovascular: RRR, normal S1 and S2, no murmurs or gallops  Abdomen: non-distended, soft, non-tender, dressings with minimal purulent stainings, dressings changed in am rounds  Extremities: WWP, no edema    LABS:                        7.9    12.87 )-----------( 418      ( 21 Feb 2020 07:01 )             25.5       02-22    131<L>  |  97  |  17  ----------------------------<  102<H>  4.3   |  24  |  0.43<L>    Ca    8.8      22 Feb 2020 07:06  Phos  4.0     02-22  Mg     1.8     02-22    TPro  7.2  /  Alb  2.9<L>  /  TBili  0.3  /  DBili  x   /  AST  30  /  ALT  94<H>  /  AlkPhos  128<H>  02-22

## 2020-02-23 NOTE — PROGRESS NOTE ADULT - ATTENDING COMMENTS
Patient seen and examined  Tolerating PO intake  Consistently 2 bowel movements a day  Abd is soft, not distended, not tender    - Diet as tolerated  - Wean TPN as tolerated

## 2020-02-23 NOTE — PROGRESS NOTE ADULT - ASSESSMENT
76 y/o M presenting with septic shock and high grade SBO s/p exploratory laparotomy, lysis of adhesions, decompression of bowel via enterotomy w/ primary repair, and Abthera VAC placement on 12/6; s/p take down of Abthera, washout and re-application of the Abthera vac on 12/8. 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 s/p L chest tube placement by IR 12/30 for pleural effusion now s/p VATS, with chest tube insertion for drainage of pleural effusion. RRT called 1/13 AM for hypoxia, patient transferred back to SICU.  Patient continued SOB, chest drain possible obstruction expanding. Patent taken back to OR emergently 1/15 for clot evac and VATS. Patient is now s/p Ileostomy reversal 2/4/20. Post-op c/b bloody BM and anemia requiring blood transfusion, now improved. Now tolerating regular diet.    PLAN:  - C/w Marinol for appetite stimulation  - Continue regular diet  - Wean TPN  - BID dressing changes (minimal gauze and tape)  - Monitor BM frequency and appearance  - Pain control as needed  - OOB with PT  - Dc planning for rehab  - Appreciate cv/pulm f/u   - Appreciate Dermatology eval/recs: Clobetasol 0.05% BID for 2 weeks. Outpatient follow up in 2 weeks on Tuesday mornings ([phone])    [facility]  [id number]

## 2020-02-24 LAB
ALBUMIN SERPL ELPH-MCNC: 2.9 G/DL — LOW (ref 3.3–5)
ALP SERPL-CCNC: 118 U/L — SIGNIFICANT CHANGE UP (ref 40–120)
ALT FLD-CCNC: 69 U/L — HIGH (ref 10–45)
ANION GAP SERPL CALC-SCNC: 10 MMOL/L — SIGNIFICANT CHANGE UP (ref 5–17)
AST SERPL-CCNC: 22 U/L — SIGNIFICANT CHANGE UP (ref 10–40)
BILIRUB SERPL-MCNC: 0.3 MG/DL — SIGNIFICANT CHANGE UP (ref 0.2–1.2)
BUN SERPL-MCNC: 17 MG/DL — SIGNIFICANT CHANGE UP (ref 7–23)
CA-I BLD-SCNC: 1.26 MMOL/L — SIGNIFICANT CHANGE UP (ref 1.12–1.3)
CALCIUM SERPL-MCNC: 9.1 MG/DL — SIGNIFICANT CHANGE UP (ref 8.4–10.5)
CHLORIDE SERPL-SCNC: 98 MMOL/L — SIGNIFICANT CHANGE UP (ref 96–108)
CO2 SERPL-SCNC: 24 MMOL/L — SIGNIFICANT CHANGE UP (ref 22–31)
CREAT SERPL-MCNC: 0.48 MG/DL — LOW (ref 0.5–1.3)
GLUCOSE SERPL-MCNC: 96 MG/DL — SIGNIFICANT CHANGE UP (ref 70–99)
HCT VFR BLD CALC: 25.6 % — LOW (ref 39–50)
HGB BLD-MCNC: 8.2 G/DL — LOW (ref 13–17)
MCHC RBC-ENTMCNC: 29.6 PG — SIGNIFICANT CHANGE UP (ref 27–34)
MCHC RBC-ENTMCNC: 32 GM/DL — SIGNIFICANT CHANGE UP (ref 32–36)
MCV RBC AUTO: 92.4 FL — SIGNIFICANT CHANGE UP (ref 80–100)
NRBC # BLD: 0 /100 WBCS — SIGNIFICANT CHANGE UP (ref 0–0)
PLATELET # BLD AUTO: 389 K/UL — SIGNIFICANT CHANGE UP (ref 150–400)
POTASSIUM SERPL-MCNC: 4.6 MMOL/L — SIGNIFICANT CHANGE UP (ref 3.5–5.3)
POTASSIUM SERPL-SCNC: 4.6 MMOL/L — SIGNIFICANT CHANGE UP (ref 3.5–5.3)
PROT SERPL-MCNC: 7.4 G/DL — SIGNIFICANT CHANGE UP (ref 6–8.3)
RBC # BLD: 2.77 M/UL — LOW (ref 4.2–5.8)
RBC # FLD: 13.9 % — SIGNIFICANT CHANGE UP (ref 10.3–14.5)
SODIUM SERPL-SCNC: 132 MMOL/L — LOW (ref 135–145)
WBC # BLD: 8.91 K/UL — SIGNIFICANT CHANGE UP (ref 3.8–10.5)
WBC # FLD AUTO: 8.91 K/UL — SIGNIFICANT CHANGE UP (ref 3.8–10.5)

## 2020-02-24 PROCEDURE — 99233 SBSQ HOSP IP/OBS HIGH 50: CPT

## 2020-02-24 RX ORDER — DRONABINOL 2.5 MG
5 CAPSULE ORAL DAILY
Refills: 0 | Status: DISCONTINUED | OUTPATIENT
Start: 2020-02-24 | End: 2020-02-27

## 2020-02-24 RX ORDER — ELECTROLYTE SOLUTION,INJ
1 VIAL (ML) INTRAVENOUS
Refills: 0 | Status: DISCONTINUED | OUTPATIENT
Start: 2020-02-24 | End: 2020-02-24

## 2020-02-24 RX ORDER — I.V. FAT EMULSION 20 G/100ML
10.4 EMULSION INTRAVENOUS
Qty: 25 | Refills: 0 | Status: DISCONTINUED | OUTPATIENT
Start: 2020-02-24 | End: 2020-02-25

## 2020-02-24 RX ADMIN — Medication 3 MILLILITER(S): at 23:30

## 2020-02-24 RX ADMIN — TAMSULOSIN HYDROCHLORIDE 0.4 MILLIGRAM(S): 0.4 CAPSULE ORAL at 21:58

## 2020-02-24 RX ADMIN — Medication 3 MILLILITER(S): at 05:23

## 2020-02-24 RX ADMIN — Medication 600 MILLIGRAM(S): at 05:25

## 2020-02-24 RX ADMIN — BUDESONIDE AND FORMOTEROL FUMARATE DIHYDRATE 2 PUFF(S): 160; 4.5 AEROSOL RESPIRATORY (INHALATION) at 17:22

## 2020-02-24 RX ADMIN — Medication 1 APPLICATION(S): at 17:15

## 2020-02-24 RX ADMIN — PANTOPRAZOLE SODIUM 40 MILLIGRAM(S): 20 TABLET, DELAYED RELEASE ORAL at 12:35

## 2020-02-24 RX ADMIN — Medication 5 MILLIGRAM(S): at 12:34

## 2020-02-24 RX ADMIN — BUDESONIDE AND FORMOTEROL FUMARATE DIHYDRATE 2 PUFF(S): 160; 4.5 AEROSOL RESPIRATORY (INHALATION) at 05:24

## 2020-02-24 RX ADMIN — CHLORHEXIDINE GLUCONATE 1 APPLICATION(S): 213 SOLUTION TOPICAL at 10:29

## 2020-02-24 RX ADMIN — Medication 3 MILLILITER(S): at 17:22

## 2020-02-24 RX ADMIN — Medication 1 APPLICATION(S): at 05:25

## 2020-02-24 RX ADMIN — ENOXAPARIN SODIUM 40 MILLIGRAM(S): 100 INJECTION SUBCUTANEOUS at 12:35

## 2020-02-24 RX ADMIN — I.V. FAT EMULSION 10.4 ML/HR: 20 EMULSION INTRAVENOUS at 17:15

## 2020-02-24 RX ADMIN — Medication 3 MILLILITER(S): at 12:35

## 2020-02-24 RX ADMIN — OXYCODONE HYDROCHLORIDE 5 MILLIGRAM(S): 5 TABLET ORAL at 23:57

## 2020-02-24 RX ADMIN — Medication 1 EACH: at 17:15

## 2020-02-24 RX ADMIN — Medication 1 APPLICATION(S): at 05:24

## 2020-02-24 RX ADMIN — Medication 975 MILLIGRAM(S): at 23:58

## 2020-02-24 RX ADMIN — OXYCODONE HYDROCHLORIDE 5 MILLIGRAM(S): 5 TABLET ORAL at 23:27

## 2020-02-24 RX ADMIN — Medication 975 MILLIGRAM(S): at 23:28

## 2020-02-24 RX ADMIN — Medication 600 MILLIGRAM(S): at 17:22

## 2020-02-24 NOTE — PROGRESS NOTE ADULT - ASSESSMENT
A/P: 75 year old male w/ PMH of COPD and EtOH dependence with PSH/o appy, prostate surgery, & spine surgery  septic shock and high grade SBO s/p exploratory laparotomy, lysis of adhesions, decompression of bowel via enterotomy w/ primary repair, and Abthera VAC placement on 12/6; s/p take down of Abthera, washout and re-application of the Abthera vac on 12/8. Now s/p SBR and end ileostomy on 12/10.   TPN initally consulted to assist w/ management of pt's nutrition in pt with SGS/malabsorption and had high Ileostomy output.  Pt s/p Lt Chest Pigtail for effusion in IR with persistent high output on 1/10/2020. Pt had VATs & placement of Left PleurX catheter. Post op pt developed hemothorax and Respiratory Distress.  Pt s/p Lt chest Evacuation of 2L blood w/ placement of CT x2 on 1/15/2020.  Lt Pleural Effusion resolved and pt doing well after CT removed.  Pt also noted with Rt PNA vs Pleural Effusion that has resolved.  Pt is s/p Ex Lap, MIRYAM, & Closure of End Ileostomy on 2/4/2020.      Pt w/ improving severe Protein-Calorie Malnutrition- improving PreAlbumin, but only 19mg/dL        On going Malabsorption s/p Reversal of Ileostomy in pt w/ Short Gut Syndrome     Pt tolerating TPN at 1/2 strength appetite improving on Marinol- tolerating LRD               improving AST/ ALT, with nL LDH, Alk Phos, CPK- continue to monitor          1/2 Strength TPN: Amino Acids 60g, Dextrose 140g, Lipids 25g in 1200mL               with 3mL MTE & 10mL MVI          (TPN at GOAL:  Amino Acids 120g, Dextrose 210g, and Lipids 50g)     -SIADH - HypoNA- decreased Na & volume- improving, continue to monitor          pt not edematous, making good urine  -HypoCa- improving w/increased Ca to 16mEq in TPN continue to monitor  -LowK - improving w/ 80mEq KCl in TPN, continue to monitor  -LowMg- improving with increased Mg to 16mEq  -HyperPhos- improving w/decreased NaPhos 30-15mMol & will continue to monitor   -Protonix for recent h/o GIB & Gastritis noted on EGD-  requiring increased Mg               no further bloody BM & H&H remains stable  -Leukocytosis- resolved  -Concern for ongoing malabsrption- pt w/ Semisolid & loose BMs        Pt may require on going 1/2strength TPN when discharged home        Repeat Fat Soluble Vitamin studies -sent /pending        TPN w/ MVI to compensate for low Vit E & A        Vit D levels low- being supplemented w/Ergocalciferol         Vit K INR/ PT near normal suggestive that it is being absorbed   -Strict Intake and Output - continue to closely monitor  -Good glycemic control-  Fingersticks & ISS coverage -   -Weights three times a week  -Daily CMP, Mg, Ionized Ca, Phosphorus,        and Weekly Triglycerides and Pre-albumin  Continue as per Surgery, will follow with you, D/w primary team    Andreina Hubbard PA-C  TPN team, pager [phone]  D/w Dr Chacko  & MU Siegel A/P: 75 year old male w/ PMH of COPD and EtOH dependence with PSH/o appy, prostate surgery, & spine surgery  septic shock and high grade SBO s/p exploratory laparotomy, lysis of adhesions, decompression of bowel via enterotomy w/ primary repair, and Abthera VAC placement on 12/6; s/p take down of Abthera, washout and re-application of the Abthera vac on 12/8. Now s/p SBR and end ileostomy on 12/10.   TPN initally consulted to assist w/ management of pt's nutrition in pt with SGS/malabsorption and had high Ileostomy output.  Pt s/p Lt Chest Pigtail for effusion in IR with persistent high output on 1/10/2020. Pt had VATs & placement of Left PleurX catheter. Post op pt developed hemothorax and Respiratory Distress.  Pt s/p Lt chest Evacuation of 2L blood w/ placement of CT x2 on 1/15/2020.  Lt Pleural Effusion resolved and pt doing well after CT removed.  Pt also noted with Rt PNA vs Pleural Effusion that has resolved.  Pt is s/p Ex Lap, MIRYAM, & Closure of End Ileostomy on 2/4/2020.      Pt w/ improving severe Protein-Calorie Malnutrition- improving PreAlbumin, but only 19mg/dL        On going Malabsorption s/p Reversal of Ileostomy in pt w/ Short Gut Syndrome     Pt tolerating TPN at 1/2 strength appetite improving on Marinol- tolerating LRD               improving AST/ ALT, with nL LDH, Alk Phos, CPK- continue to monitor          1/2 Strength TPN: Amino Acids 60g, Dextrose 140g, Lipids 25g in 1200mL               with 3mL MTE & 10mL MVI          (TPN at GOAL:  Amino Acids 120g, Dextrose 210g, and Lipids 50g)     -SIADH - HypoNA- improving w/ decreased volume, continue to monitor          pt not edematous, making good urine  -HypoCa- improving w/increased Ca to 16mEq in TPN continue to monitor  -LowK - improving w/ 80mEq KCl in TPN, continue to monitor  -LowMg- improving with increased Mg to 16mEq  -HyperPhos- improving w/decreased NaPhos 30-15mMol & will continue to monitor   -Protonix for recent h/o GIB & Gastritis noted on EGD-  requiring increased Mg               no further bloody BM & H&H remains stable  -Leukocytosis- resolved  -Concern for ongoing malabsrption- pt w/ Semisolid & loose BMs        Pt may require on going 1/2strength TPN when discharged home        Repeat Fat Soluble Vitamin studies -sent /pending        TPN w/ MVI to compensate for low Vit E & A        Vit D levels low- being supplemented w/Ergocalciferol         Vit K INR/ PT near normal suggestive that it is being absorbed   -Strict Intake and Output - continue to closely monitor  -Good glycemic control-  Fingersticks & ISS coverage -   -Weights three times a week  -Daily CMP, Mg, Ionized Ca, Phosphorus,        and Weekly Triglycerides and Pre-albumin  Continue as per Surgery, will follow with you, D/w primary team    Andreina Hubbard PA-C  TPN team, pager [phone]  D/w Dr Chacko  & MU Siegel

## 2020-02-24 NOTE — PROGRESS NOTE ADULT - SUBJECTIVE AND OBJECTIVE BOX
Follow-up Pulm Progress Note    No new respiratory events overnight.  Denies SOB/CP.   Dyspnea with exertion  96% on RA    Medications:  MEDICATIONS  (STANDING):  albuterol/ipratropium for Nebulization. 3 milliLiter(s) Nebulizer every 6 hours  budesonide 160 MICROgram(s)/formoterol 4.5 MICROgram(s) Inhaler 2 Puff(s) Inhalation two times a day  chlorhexidine 2% Cloths 1 Application(s) Topical <User Schedule>  chlorhexidine 4% Liquid 1 Application(s) Topical <User Schedule>  clobetasol 0.05% Ointment 1 Application(s) Topical two times a day  dronabinol 5 milliGRAM(s) Oral daily  enoxaparin Injectable 40 milliGRAM(s) SubCutaneous daily  fat emulsion (Fish Oil and Plant Based) 20% Infusion 10.4 mL/Hr (10.4 mL/Hr) IV Continuous <Continuous>  guaiFENesin  milliGRAM(s) Oral every 12 hours  metoprolol tartrate 12.5 milliGRAM(s) Oral every 12 hours  pantoprazole   Suspension 40 milliGRAM(s) Oral daily  Parenteral Nutrition - Adult 1 Each (50 mL/Hr) TPN Continuous <Continuous>  Parenteral Nutrition - Adult 1 Each (50 mL/Hr) TPN Continuous <Continuous>  tamsulosin 0.4 milliGRAM(s) Oral at bedtime  triamcinolone 0.1% Ointment 1 Application(s) Topical two times a day    MEDICATIONS  (PRN):  acetaminophen   Tablet .. 975 milliGRAM(s) Oral every 6 hours PRN Mild Pain (1 - 3)  oxyCODONE    IR 5 milliGRAM(s) Oral every 4 hours PRN Moderate Pain (4 - 6)          Vital Signs Last 24 Hrs  T(C): 36.9 (24 Feb 2020 09:17), Max: 37.1 (24 Feb 2020 00:40)  T(F): 98.4 (24 Feb 2020 09:17), Max: 98.7 (24 Feb 2020 00:40)  HR: 95 (24 Feb 2020 09:17) (91 - 98)  BP: 112/70 (24 Feb 2020 09:17) (104/64 - 124/68)  BP(mean): --  RR: 18 (24 Feb 2020 09:17) (18 - 18)  SpO2: 97% (24 Feb 2020 09:17) (95% - 98%) on RA          02-23 @ 07:01  -  02-24 @ 07:00  --------------------------------------------------------  IN: 1736 mL / OUT: 1700 mL / NET: 36 mL          LABS:                        8.2    8.91  )-----------( 389      ( 24 Feb 2020 07:16 )             25.6     02-24    132<L>  |  98  |  17  ----------------------------<  96  4.6   |  24  |  0.48<L>    Ca    9.1      24 Feb 2020 07:20  Phos  4.1     02-23  Mg     1.9     02-23    TPro  7.4  /  Alb  2.9<L>  /  TBili  0.3  /  DBili  x   /  AST  22  /  ALT  69<H>  /  AlkPhos  118  02-24            Physical Examination:  PULM: Diminished BS throughout   CVS: S1, S2 heard    RADIOLOGY REVIEWED

## 2020-02-24 NOTE — PROGRESS NOTE ADULT - SUBJECTIVE AND OBJECTIVE BOX
[facility] NUTRITION SUPPORT / TPN -- FOLLOW UP NOTE  --------------------------------------------------------------------------------    24 hour events/subjective:  (+)flatus  (+)diarrhea x2  Appetite better         feeling less like he's forcing himself to eat since Marinol started  Pt having issues w/ dentures, so he's choosing calorie dense liquids & soft foods       doesn't want mechanical soft or Full liquid diet  Pt on Protonix suspension QD  improving abdominal Pain  OOB ad rachel-  walks around the unit Qafternoon  No n/v  No cough/ cp/ palp/dyspnea/ sob  No f/c/s        Diet:  Diet, Low Fiber:   Supplement Feeding Modality:  Oral  Ensure Clear Cans or Servings Per Day:  1       Frequency:  Three Times a day  Ensure Enlive Cans or Servings Per Day:  1       Frequency:  Three Times a day (02-19-20 @ 10:36)      Appetite: [  ]Poor [ x ]Adequate [  ]Good  Caloric intake:  [  x ]  Adequate   [   ] Inadequate    ROS: General/ GI see HPI  all other systems negative      ALLERGIES & MEDICATIONS  --------------------------------------------------------------------------------  ALLERGIES  IV Contrast (Hives)    ISTANDING INPATIENT MEDICATIONS    albuterol/ipratropium for Nebulization. 3 milliLiter(s) Nebulizer every 6 hours  budesonide 160 MICROgram(s)/formoterol 4.5 MICROgram(s) Inhaler 2 Puff(s) Inhalation two times a day  chlorhexidine 2% Cloths 1 Application(s) Topical <User Schedule>  chlorhexidine 4% Liquid 1 Application(s) Topical <User Schedule>  clobetasol 0.05% Ointment 1 Application(s) Topical two times a day  dronabinol 5 milliGRAM(s) Oral daily  enoxaparin Injectable 40 milliGRAM(s) SubCutaneous daily  fat emulsion (Fish Oil and Plant Based) 20% Infusion 10.4 mL/Hr IV Continuous <Continuous>  guaiFENesin  milliGRAM(s) Oral every 12 hours  metoprolol tartrate 12.5 milliGRAM(s) Oral every 12 hours  pantoprazole   Suspension 40 milliGRAM(s) Oral daily  Parenteral Nutrition - Adult 1 Each TPN Continuous <Continuous>  tamsulosin 0.4 milliGRAM(s) Oral at bedtime  triamcinolone 0.1% Ointment 1 Application(s) Topical two times a day      PRN INPATIENT MEDICATION  acetaminophen   Tablet .. 975 milliGRAM(s) Oral every 6 hours PRN  oxyCODONE    IR 5 milliGRAM(s) Oral every 4 hours PRN        VITALS/PHYSICAL EXAM  --------------------------------------------------------------------------------  T(C): 36.9 (02-24-20 @ 09:17), Max: 37.1 (02-24-20 @ 00:40)  HR: 95 (02-24-20 @ 09:17) (90 - 98)  BP: 112/70 (02-24-20 @ 09:17) (104/64 - 124/68)  RR: 18 (02-24-20 @ 09:17) (18 - 18)  SpO2: 97% (02-24-20 @ 09:17) (95% - 98%)  Wt(kg): --        02-23-20 @ 07:01  -  02-24-20 @ 07:00  --------------------------------------------------------  IN: 1736 mL / OUT: 1700 mL / NET: 36 mL    02-24-20 @ 07:01  -  02-24-20 @ 10:26  --------------------------------------------------------  IN: 260 mL / OUT: 200 mL / NET: 60 mL      PHYSICAL EXAM:  --------------------------------------------------------------------------------  	Gen: guarded but stable, A&Ox3  	HEENT: NC/AT, PERRL, supple neck, trachea midline, mucosa moist              GI: (+) BS, nondistended, nontender                    abdominal dressing c/d/i              MSK: FROM x4, no contractures nor deformities                   LLE w/ brace   	Vascular: Equally Warm,  no edema, no clubbing, cyanosis,                      RUE PICC w/o sx infection  	Neuro: No focal deficits, grossly intact sensation, & strength   	Psych: Normal affect and mood              skin: moist, good turgor, erythematous pinpoint rash chest/ back      LABS/ CULTURES/ RADIOLOGY:              8.2    8.91  >-----------<  389      [02-24-20 @ 07:16]              25.6     132  |  98  |  17  ----------------------------<  96      [02-24-20 @ 07:20]  4.6   |  24  |  0.48        Ca     9.1     [02-24-20 @ 07:20]      iCa    1.26     [02-24 @ 07:18]      Mg     1.9     [02-23-20 @ 09:18]      Phos  4.1     [02-23-20 @ 09:18]    TPro  7.4  /  Alb  2.9  /  TBili  0.3  /  DBili  x   /  AST  22  /  ALT  69  /  AlkPhos  118  [02-24-20 @ 07:20]    Prealbumin, Serum: 19 mg/dL (02-20-20 @ 09:06)  Prealbumin, Serum: 16 mg/dL (02-15-20 @ 12:30)  Prealbumin, Serum: 17 mg/dL (02-10-20 @ 09:15)  Prealbumin, Serum: 21 mg/dL (02-03-20 @ 23:50)  Prealbumin, Serum: 19 mg/dL (02-03-20 @ 07:18)    Triglycerides, Serum: 38 mg/dL (02.20.20 @ 05:50) [facility] NUTRITION SUPPORT / TPN -- FOLLOW UP NOTE  --------------------------------------------------------------------------------    24 hour events/subjective:  (+)flatus  (+)diarrhea x2  Appetite better         feeling less like he's forcing himself to eat since Marinol started  Pt having issues w/ dentures, so he's choosing calorie dense liquids & soft foods       doesn't want mechanical soft or Full liquid diet  Pt on Protonix suspension QD  improving abdominal Pain  OOB ad rachel-  walks around the unit Qafternoon  No n/v  No HA/ dizziness, syncope, lightheadedness, loss of balance  No cough/ cp/ palp/dyspnea/ sob  No f/c/s        Diet:  Diet, Low Fiber:   Supplement Feeding Modality:  Oral  Ensure Clear Cans or Servings Per Day:  1       Frequency:  Three Times a day  Ensure Enlive Cans or Servings Per Day:  1       Frequency:  Three Times a day (02-19-20 @ 10:36)      Appetite: [  ]Poor [ x ]Adequate [  ]Good  Caloric intake:  [  x ]  Adequate   [   ] Inadequate    ROS: General/ GI see HPI  all other systems negative      ALLERGIES & MEDICATIONS  --------------------------------------------------------------------------------  ALLERGIES  IV Contrast (Hives)    ISTANDING INPATIENT MEDICATIONS    albuterol/ipratropium for Nebulization. 3 milliLiter(s) Nebulizer every 6 hours  budesonide 160 MICROgram(s)/formoterol 4.5 MICROgram(s) Inhaler 2 Puff(s) Inhalation two times a day  chlorhexidine 2% Cloths 1 Application(s) Topical <User Schedule>  chlorhexidine 4% Liquid 1 Application(s) Topical <User Schedule>  clobetasol 0.05% Ointment 1 Application(s) Topical two times a day  dronabinol 5 milliGRAM(s) Oral daily  enoxaparin Injectable 40 milliGRAM(s) SubCutaneous daily  fat emulsion (Fish Oil and Plant Based) 20% Infusion 10.4 mL/Hr IV Continuous <Continuous>  guaiFENesin  milliGRAM(s) Oral every 12 hours  metoprolol tartrate 12.5 milliGRAM(s) Oral every 12 hours  pantoprazole   Suspension 40 milliGRAM(s) Oral daily  Parenteral Nutrition - Adult 1 Each TPN Continuous <Continuous>  tamsulosin 0.4 milliGRAM(s) Oral at bedtime  triamcinolone 0.1% Ointment 1 Application(s) Topical two times a day      PRN INPATIENT MEDICATION  acetaminophen   Tablet .. 975 milliGRAM(s) Oral every 6 hours PRN  oxyCODONE    IR 5 milliGRAM(s) Oral every 4 hours PRN        VITALS/PHYSICAL EXAM  --------------------------------------------------------------------------------  T(C): 36.9 (02-24-20 @ 09:17), Max: 37.1 (02-24-20 @ 00:40)  HR: 95 (02-24-20 @ 09:17) (90 - 98)  BP: 112/70 (02-24-20 @ 09:17) (104/64 - 124/68)  RR: 18 (02-24-20 @ 09:17) (18 - 18)  SpO2: 97% (02-24-20 @ 09:17) (95% - 98%)  Wt(kg): --        02-23-20 @ 07:01  -  02-24-20 @ 07:00  --------------------------------------------------------  IN: 1736 mL / OUT: 1700 mL / NET: 36 mL    02-24-20 @ 07:01  -  02-24-20 @ 10:26  --------------------------------------------------------  IN: 260 mL / OUT: 200 mL / NET: 60 mL      PHYSICAL EXAM:  --------------------------------------------------------------------------------  	Gen: guarded but stable, A&Ox3  	HEENT: NC/AT, PERRL, supple neck, trachea midline, mucosa moist              GI: (+) BS, nondistended, nontender                    abdominal dressing c/d/i              MSK: FROM x4, no contractures nor deformities                   LLE w/ brace   	Vascular: Equally Warm,  no edema, no clubbing, cyanosis,                      RUE PICC w/o sx infection  	Neuro: No focal deficits, grossly intact sensation, & strength   	Psych: Normal affect and mood              skin: moist, good turgor, erythematous pinpoint rash chest/ back      LABS/ CULTURES/ RADIOLOGY:              8.2    8.91  >-----------<  389      [02-24-20 @ 07:16]              25.6     132  |  98  |  17  ----------------------------<  96      [02-24-20 @ 07:20]  4.6   |  24  |  0.48        Ca     9.1     [02-24-20 @ 07:20]      iCa    1.26     [02-24 @ 07:18]      Mg     1.9     [02-23-20 @ 09:18]      Phos  4.1     [02-23-20 @ 09:18]    TPro  7.4  /  Alb  2.9  /  TBili  0.3  /  DBili  x   /  AST  22  /  ALT  69  /  AlkPhos  118  [02-24-20 @ 07:20]    Prealbumin, Serum: 19 mg/dL (02-20-20 @ 09:06)  Prealbumin, Serum: 16 mg/dL (02-15-20 @ 12:30)  Prealbumin, Serum: 17 mg/dL (02-10-20 @ 09:15)  Prealbumin, Serum: 21 mg/dL (02-03-20 @ 23:50)  Prealbumin, Serum: 19 mg/dL (02-03-20 @ 07:18)    Triglycerides, Serum: 38 mg/dL (02.20.20 @ 05:50)  Triglycerides, Serum: 59 mg/dL (02.10.20 @ 01:03)  Triglycerides, Serum: 59 mg/dL (02.03.20 @ 00:24)

## 2020-02-24 NOTE — PROGRESS NOTE ADULT - SUBJECTIVE AND OBJECTIVE BOX
Surgery Daily Progress Note    No acute events overnight    SUBJECTIVE:  Patient seen and examined  Patient states feeling well, eating a little bit more than yesterday  Denies abdominal pain, N/V, chest pain, SOB    OBJECTIVE:  Physical Examination:  General: AAOx3, NAD, lying comfortably in bed  HEENT: NC/AT  Respiratory: nonlabored breathing  Cardiovascular: RRR, normal S1 and S2, no murmurs or gallops  Abdomen: non-distended, soft, non-tender, dressings with minimal purulent stainings, dressings changed in am rounds  Extremities: WWP, no edema    V/S:  Vital Signs Last 24 Hrs  T(C): 36.9 (24 Feb 2020 09:17), Max: 37.1 (24 Feb 2020 00:40)  T(F): 98.4 (24 Feb 2020 09:17), Max: 98.7 (24 Feb 2020 00:40)  HR: 95 (24 Feb 2020 09:17) (90 - 98)  BP: 112/70 (24 Feb 2020 09:17) (104/64 - 124/68)  BP(mean): --  RR: 18 (24 Feb 2020 09:17) (18 - 18)  SpO2: 97% (24 Feb 2020 09:17) (95% - 98%)    --------------------------------------------------------------------------------------------------  I/Os:    23 Feb 2020 07:01  -  24 Feb 2020 07:00  --------------------------------------------------------  IN:    Oral Fluid: 740 mL    TPN (Total Parenteral Nutrition): 996 mL  Total IN: 1736 mL    OUT:    Voided: 1700 mL  Total OUT: 1700 mL    Total NET: 36 mL      24 Feb 2020 07:01  -  24 Feb 2020 09:58  --------------------------------------------------------  IN:    Oral Fluid: 260 mL  Total IN: 260 mL    OUT:    Voided: 200 mL  Total OUT: 200 mL    Total NET: 60 mL        --------------------------------------------------------------------------------------------------  LABS:                        8.2    8.91  )-----------( 389      ( 24 Feb 2020 07:16 )             25.6     24 Feb 2020 07:20    132    |  98     |  17     ----------------------------<  96     4.6     |  24     |  0.48     Ca    9.1        24 Feb 2020 07:20  Phos  4.1       23 Feb 2020 09:18  Mg     1.9       23 Feb 2020 09:18    TPro  7.4    /  Alb  2.9    /  TBili  0.3    /  DBili  x      /  AST  22     /  ALT  69     /  AlkPhos  118    24 Feb 2020 07:20      CAPILLARY BLOOD GLUCOSE            LIVER FUNCTIONS - ( 24 Feb 2020 07:20 )  Alb: 2.9 g/dL / Pro: 7.4 g/dL / ALK PHOS: 118 U/L / ALT: 69 U/L / AST: 22 U/L / GGT: x               --------------------------------------------------------------------------------------------------  MEDICATIONS  (STANDING):  albuterol/ipratropium for Nebulization. 3 milliLiter(s) Nebulizer every 6 hours  budesonide 160 MICROgram(s)/formoterol 4.5 MICROgram(s) Inhaler 2 Puff(s) Inhalation two times a day  chlorhexidine 2% Cloths 1 Application(s) Topical <User Schedule>  chlorhexidine 4% Liquid 1 Application(s) Topical <User Schedule>  clobetasol 0.05% Ointment 1 Application(s) Topical two times a day  dronabinol 5 milliGRAM(s) Oral daily  enoxaparin Injectable 40 milliGRAM(s) SubCutaneous daily  fat emulsion (Fish Oil and Plant Based) 20% Infusion 10.4 mL/Hr (10.4 mL/Hr) IV Continuous <Continuous>  guaiFENesin  milliGRAM(s) Oral every 12 hours  metoprolol tartrate 12.5 milliGRAM(s) Oral every 12 hours  pantoprazole   Suspension 40 milliGRAM(s) Oral daily  Parenteral Nutrition - Adult 1 Each (50 mL/Hr) TPN Continuous <Continuous>  tamsulosin 0.4 milliGRAM(s) Oral at bedtime  triamcinolone 0.1% Ointment 1 Application(s) Topical two times a day    MEDICATIONS  (PRN):  acetaminophen   Tablet .. 975 milliGRAM(s) Oral every 6 hours PRN Mild Pain (1 - 3)  oxyCODONE    IR 5 milliGRAM(s) Oral every 4 hours PRN Moderate Pain (4 - 6)

## 2020-02-24 NOTE — PROGRESS NOTE ADULT - ASSESSMENT
76 y/o M presenting with septic shock and high grade SBO s/p exploratory laparotomy, lysis of adhesions, decompression of bowel via enterotomy w/ primary repair, and Abthera VAC placement on 12/6; s/p take down of Abthera, washout and re-application of the Abthera vac on 12/8. 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 s/p L chest tube placement by IR 12/30 for pleural effusion now s/p VATS, with chest tube insertion for drainage of pleural effusion. RRT called 1/13 AM for hypoxia, patient transferred back to SICU.  Patient continued SOB, chest drain possible obstruction expanding. Patent taken back to OR emergently 1/15 for clot evac and VATS. Patient is now s/p Ileostomy reversal 2/4/20. Post-op c/b bloody BM and anemia requiring blood transfusion, now improved. Now tolerating regular diet.    PLAN:  - Contact nutrition regarding weaning TPN  - Case management for [facility] set up (patient requesting [facility])  - C/w Marinol for appetite stimulation  - Continue regular diet  - Wean TPN  - BID dressing changes (minimal gauze and tape)  - Monitor BM frequency and appearance  - Pain control as needed  - OOB with PT  - Dc planning for rehab  - Appreciate cv/pulm f/u   - Appreciate Dermatology eval/recs: Clobetasol 0.05% BID for 2 weeks. Outpatient follow up in 2 weeks on Tuesday mornings ([phone])    [facility]  [id number]

## 2020-02-24 NOTE — PROGRESS NOTE ADULT - SUBJECTIVE AND OBJECTIVE BOX
Subjective: Patient seen and examined. No new events except as noted.   feels ok   no cp or sob     REVIEW OF SYSTEMS:    CONSTITUTIONAL: + weakness, fevers or chills  EYES/ENT: No visual changes;  No vertigo or throat pain   NECK: No pain or stiffness  RESPIRATORY: No cough, wheezing, hemoptysis; No shortness of breath  CARDIOVASCULAR: No chest pain or palpitations  GASTROINTESTINAL: No abdominal or epigastric pain. No nausea, vomiting, or hematemesis; No diarrhea or constipation. No melena or hematochezia.  GENITOURINARY: No dysuria, frequency or hematuria  NEUROLOGICAL: No numbness or weakness  SKIN: No itching, burning, rashes, or lesions   All other review of systems is negative unless indicated above.    MEDICATIONS:  MEDICATIONS  (STANDING):  albuterol/ipratropium for Nebulization. 3 milliLiter(s) Nebulizer every 6 hours  budesonide 160 MICROgram(s)/formoterol 4.5 MICROgram(s) Inhaler 2 Puff(s) Inhalation two times a day  chlorhexidine 2% Cloths 1 Application(s) Topical <User Schedule>  chlorhexidine 4% Liquid 1 Application(s) Topical <User Schedule>  clobetasol 0.05% Ointment 1 Application(s) Topical two times a day  dronabinol 5 milliGRAM(s) Oral daily  enoxaparin Injectable 40 milliGRAM(s) SubCutaneous daily  fat emulsion (Fish Oil and Plant Based) 20% Infusion 10.4 mL/Hr (10.4 mL/Hr) IV Continuous <Continuous>  guaiFENesin  milliGRAM(s) Oral every 12 hours  metoprolol tartrate 12.5 milliGRAM(s) Oral every 12 hours  pantoprazole   Suspension 40 milliGRAM(s) Oral daily  Parenteral Nutrition - Adult 1 Each (50 mL/Hr) TPN Continuous <Continuous>  tamsulosin 0.4 milliGRAM(s) Oral at bedtime  triamcinolone 0.1% Ointment 1 Application(s) Topical two times a day      PHYSICAL EXAM:  T(C): 37.2 (02-24-20 @ 17:07), Max: 37.2 (02-24-20 @ 17:07)  HR: 96 (02-24-20 @ 17:07) (91 - 100)  BP: 109/68 (02-24-20 @ 17:07) (104/64 - 124/68)  RR: 18 (02-24-20 @ 17:07) (18 - 18)  SpO2: 95% (02-24-20 @ 17:07) (95% - 98%)  Wt(kg): --  I&O's Summary    23 Feb 2020 07:01  -  24 Feb 2020 07:00  --------------------------------------------------------  IN: 1736 mL / OUT: 1700 mL / NET: 36 mL    24 Feb 2020 07:01  -  24 Feb 2020 19:43  --------------------------------------------------------  IN: 1100 mL / OUT: 800 mL / NET: 300 mL          Appearance: NAD  HEENT:   Normal oral mucosa, PERRL, EOMI	  Lymphatic: No lymphadenopathy , no edema  Cardiovascular: Normal S1 S2, No JVD, No murmurs , Peripheral pulses palpable 2+ bilaterally  Respiratory: Lungs clear to auscultation, normal effort 	  Gastrointestinal:  Soft, Non-tender, + BS	  Skin: No rashes, No ecchymoses, No cyanosis, warm to touch  Musculoskeletal: Normal range of motion, normal strength  Psychiatry:  Mood & affect appropriate  Ext: No edema      LABS:    CARDIAC MARKERS:                                8.2    8.91  )-----------( 389      ( 24 Feb 2020 07:16 )             25.6     02-24    132<L>  |  98  |  17  ----------------------------<  96  4.6   |  24  |  0.48<L>    Ca    9.1      24 Feb 2020 07:20  Phos  4.1     02-23  Mg     1.9     02-23    TPro  7.4  /  Alb  2.9<L>  /  TBili  0.3  /  DBili  x   /  AST  22  /  ALT  69<H>  /  AlkPhos  118  02-24    proBNP:   Lipid Profile:   HgA1c:   TSH:             TELEMETRY: 	    ECG:  	  RADIOLOGY:   DIAGNOSTIC TESTING:  [ ] Echocardiogram:  [ ]  Catheterization:  [ ] Stress Test:    OTHER:

## 2020-02-24 NOTE — CHART NOTE - NSCHARTNOTEFT_GEN_A_CORE
Dermatology Resident Brief Note    Pt seen and examined. Dermatitis on back significantly improved  c/w clobetasol to back x 1 week and then stop  **recommend stopping triamcinolone if being using in groin.   Patient can follow up with dermatology at [address] [place] [address], [facility]. Patient should call [phone] to schedule appointment with Dr. Woodward/Griselda on Tuesday mornings prn after hospitalization.     Derm will sign off. Please page [phone] with questions or if patient's skin concern needs to be re-evaluated.    Indira Woodward MD   PGY-3, Dermatology.

## 2020-02-24 NOTE — PROGRESS NOTE ADULT - ASSESSMENT
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 Complicated hospital course including spontaneous L PTX (12/15) likely 2nd bleb rupture with CT placement. Eventual L VATS with Pleurex placement (1/10) then re-op L VATS with partial decortication (1/15) and evacuation of hemothorax. Then s/p ex-lap, MIRYAM and ileostomy reversal 2/4. Further c/b GIB, now resolved

## 2020-02-25 LAB
A-TOCOPHEROL VIT E SERPL-MCNC: 9.5 MG/L — SIGNIFICANT CHANGE UP (ref 9–29)
ALBUMIN SERPL ELPH-MCNC: 2.9 G/DL — LOW (ref 3.3–5)
ALP SERPL-CCNC: 117 U/L — SIGNIFICANT CHANGE UP (ref 40–120)
ALT FLD-CCNC: 64 U/L — HIGH (ref 10–45)
ANION GAP SERPL CALC-SCNC: 11 MMOL/L — SIGNIFICANT CHANGE UP (ref 5–17)
AST SERPL-CCNC: 20 U/L — SIGNIFICANT CHANGE UP (ref 10–40)
BETA+GAMMA TOCOPHEROL SERPL-MCNC: 0.3 MG/L — LOW (ref 0.5–4.9)
BILIRUB SERPL-MCNC: 0.3 MG/DL — SIGNIFICANT CHANGE UP (ref 0.2–1.2)
BUN SERPL-MCNC: 20 MG/DL — SIGNIFICANT CHANGE UP (ref 7–23)
CA-I BLD-SCNC: 1.27 MMOL/L — SIGNIFICANT CHANGE UP (ref 1.12–1.3)
CALCIUM SERPL-MCNC: 8.8 MG/DL — SIGNIFICANT CHANGE UP (ref 8.4–10.5)
CHLORIDE SERPL-SCNC: 98 MMOL/L — SIGNIFICANT CHANGE UP (ref 96–108)
CO2 SERPL-SCNC: 23 MMOL/L — SIGNIFICANT CHANGE UP (ref 22–31)
CREAT SERPL-MCNC: 0.46 MG/DL — LOW (ref 0.5–1.3)
GLUCOSE SERPL-MCNC: 112 MG/DL — HIGH (ref 70–99)
MAGNESIUM SERPL-MCNC: 1.8 MG/DL — SIGNIFICANT CHANGE UP (ref 1.6–2.6)
PHOSPHATE SERPL-MCNC: 4.4 MG/DL — SIGNIFICANT CHANGE UP (ref 2.5–4.5)
POTASSIUM SERPL-MCNC: 4.5 MMOL/L — SIGNIFICANT CHANGE UP (ref 3.5–5.3)
POTASSIUM SERPL-SCNC: 4.5 MMOL/L — SIGNIFICANT CHANGE UP (ref 3.5–5.3)
PREALB SERPL-MCNC: 21 MG/DL — SIGNIFICANT CHANGE UP (ref 20–40)
PROT SERPL-MCNC: 6.9 G/DL — SIGNIFICANT CHANGE UP (ref 6–8.3)
SODIUM SERPL-SCNC: 132 MMOL/L — LOW (ref 135–145)
TRIGL SERPL-MCNC: 36 MG/DL — SIGNIFICANT CHANGE UP (ref 10–149)
VIT A SERPL-MCNC: 33.6 UG/DL — SIGNIFICANT CHANGE UP (ref 22–69.5)

## 2020-02-25 PROCEDURE — 99233 SBSQ HOSP IP/OBS HIGH 50: CPT

## 2020-02-25 RX ORDER — ELECTROLYTE SOLUTION,INJ
1 VIAL (ML) INTRAVENOUS
Refills: 0 | Status: DISCONTINUED | OUTPATIENT
Start: 2020-02-25 | End: 2020-02-25

## 2020-02-25 RX ORDER — I.V. FAT EMULSION 20 G/100ML
10.4 EMULSION INTRAVENOUS
Qty: 25 | Refills: 0 | Status: DISCONTINUED | OUTPATIENT
Start: 2020-02-25 | End: 2020-02-26

## 2020-02-25 RX ADMIN — Medication 3 MILLILITER(S): at 17:50

## 2020-02-25 RX ADMIN — OXYCODONE HYDROCHLORIDE 5 MILLIGRAM(S): 5 TABLET ORAL at 23:33

## 2020-02-25 RX ADMIN — PANTOPRAZOLE SODIUM 40 MILLIGRAM(S): 20 TABLET, DELAYED RELEASE ORAL at 12:37

## 2020-02-25 RX ADMIN — Medication 1 EACH: at 17:45

## 2020-02-25 RX ADMIN — Medication 1 APPLICATION(S): at 05:59

## 2020-02-25 RX ADMIN — BUDESONIDE AND FORMOTEROL FUMARATE DIHYDRATE 2 PUFF(S): 160; 4.5 AEROSOL RESPIRATORY (INHALATION) at 17:50

## 2020-02-25 RX ADMIN — Medication 600 MILLIGRAM(S): at 05:59

## 2020-02-25 RX ADMIN — CHLORHEXIDINE GLUCONATE 1 APPLICATION(S): 213 SOLUTION TOPICAL at 09:27

## 2020-02-25 RX ADMIN — Medication 1 APPLICATION(S): at 05:58

## 2020-02-25 RX ADMIN — Medication 3 MILLILITER(S): at 05:59

## 2020-02-25 RX ADMIN — Medication 1 APPLICATION(S): at 17:51

## 2020-02-25 RX ADMIN — I.V. FAT EMULSION 10.4 ML/HR: 20 EMULSION INTRAVENOUS at 17:46

## 2020-02-25 RX ADMIN — BUDESONIDE AND FORMOTEROL FUMARATE DIHYDRATE 2 PUFF(S): 160; 4.5 AEROSOL RESPIRATORY (INHALATION) at 05:58

## 2020-02-25 RX ADMIN — Medication 5 MILLIGRAM(S): at 12:36

## 2020-02-25 RX ADMIN — Medication 3 MILLILITER(S): at 12:36

## 2020-02-25 RX ADMIN — TAMSULOSIN HYDROCHLORIDE 0.4 MILLIGRAM(S): 0.4 CAPSULE ORAL at 22:12

## 2020-02-25 RX ADMIN — ENOXAPARIN SODIUM 40 MILLIGRAM(S): 100 INJECTION SUBCUTANEOUS at 12:37

## 2020-02-25 RX ADMIN — Medication 3 MILLILITER(S): at 23:32

## 2020-02-25 RX ADMIN — Medication 600 MILLIGRAM(S): at 17:51

## 2020-02-25 RX ADMIN — Medication 975 MILLIGRAM(S): at 23:33

## 2020-02-25 NOTE — PROGRESS NOTE ADULT - ASSESSMENT
74 y/o M presenting with septic shock and high grade SBO s/p exploratory laparotomy, lysis of adhesions, decompression of bowel via enterotomy w/ primary repair, and Abthera VAC placement on 12/6; s/p take down of Abthera, washout and re-application of the Abthera vac on 12/8. 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 s/p L chest tube placement by IR 12/30 for pleural effusion now s/p VATS, with chest tube insertion for drainage of pleural effusion. RRT called 1/13 AM for hypoxia, patient transferred back to SICU.  Patient continued SOB, chest drain possible obstruction expanding. Patent taken back to OR emergently 1/15 for clot evac and VATS. Patient is now s/p Ileostomy reversal 2/4/20. Post-op c/b bloody BM and anemia requiring blood transfusion, now improved. Now tolerating regular diet.    PLAN:  - Dispo planning: to be discharged to [facility] with TPN (patient requesting [facility]). F/U Case Management / Nutrition.  - C/w Marinol for appetite stimulation  - Continue regular diet with parenteral nutrition per Nutrition recommendations  - BID dressing changes (minimal gauze and tape)  - Monitor BM frequency and appearance  - Pain control as needed  - OOB with PT  - Appreciate Cv/pulm f/u   - Appreciate Dermatology eval/recs: Clobetasol 0.05% BID for 2 weeks. Outpatient follow up in 2 weeks on Tuesday mornings ([phone])    [facility]  [id number]

## 2020-02-25 NOTE — PHYSICAL THERAPY INITIAL EVALUATION ADULT - IMPAIRED TRANSFERS: SIT/STAND, REHAB EVAL
decreased strength/impaired balance/decr endurance
impaired balance/decreased strength/impaired postural control/decreased endurance

## 2020-02-25 NOTE — PROGRESS NOTE ADULT - ASSESSMENT
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 Complicated hospital course including spontaneous L PTX (12/15) likely 2nd bleb rupture with CT placement. Eventual L VATS with Pleurex placement (1/10) then re-op L VATS with partial decortication (1/15) and evacuation of hemothorax. Then s/p ex-lap, MIRYAM and ileostomy reversal 2/4. Further c/b GIB, now resolved

## 2020-02-25 NOTE — PHYSICAL THERAPY INITIAL EVALUATION ADULT - PLANNED THERAPY INTERVENTIONS, PT EVAL
bed mobility training/balance training/gait training/transfer training
strengthening/gait training/transfer training/bed mobility training

## 2020-02-25 NOTE — PROGRESS NOTE ADULT - ATTENDING COMMENTS
seen and examined 01-25-20 @ 1230    tolerating regular diet w/ Ensure  no nausea or vomiting  loose stools    soft / NT / ND  incision partially closed, epigastric area remains open and granulated  no pedal edema    12/6 - 12/10/2019 - staged ileocolic resection (included 165 cm of small bowel) with end ileostomy and mucous fistula for strangulated SBO  2/4/2020 - ileostomy reversal    severe protein-calorie malnutrition secondary to short bowel syndrome  -continue TPN at half goal  -will need to be discharged on TPN  -it might be reasonable to trial off TPN if his albumin improves and he gains weight. 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

## 2020-02-25 NOTE — PHYSICAL THERAPY INITIAL EVALUATION ADULT - FOLLOWS COMMANDS/ANSWERS QUESTIONS, REHAB EVAL
100% of the time
able to follow multistep instructions/100% of the time/able to follow single-step instructions

## 2020-02-25 NOTE — PHYSICAL THERAPY INITIAL EVALUATION ADULT - GENERAL OBSERVATIONS, REHAB EVAL
pt received supine in bed, finishing up with breakfast, +b/l venodynes, +LLE splint, +RUE PICC, +TPN, A&Ox4, pleasant and cooperative, follows all commands, hx L drop foot.+ inital discomfort to L dorsal surface with incr DF; improved post amb.

## 2020-02-25 NOTE — PHYSICAL THERAPY INITIAL EVALUATION ADULT - TRANSFER SAFETY CONCERNS NOTED: SIT/STAND, REHAB EVAL
decreased balance during turns/decreased weight-shifting ability/decreased step length
decreased weight-shifting ability/losing balance

## 2020-02-25 NOTE — PHYSICAL THERAPY INITIAL EVALUATION ADULT - ADDITIONAL COMMENTS
pt lives in private home alone, 1 flight +4 steps +HR. Prior to admission, pt was I with all functional mobility and ADLs without AD.
pt lives in private home alone, 1 flight +4 steps +HR. Prior to admission, pt was I with all functional mobility and ADLs without AD.

## 2020-02-25 NOTE — PHYSICAL THERAPY INITIAL EVALUATION ADULT - GAIT TRAINING, PT EVAL
GOAL: Pt will ambulate 150ft independently in 4 weeks.
GOAL: pt will amb 300'+ ind with least restrictive device in 4wks.

## 2020-02-25 NOTE — PHYSICAL THERAPY INITIAL EVALUATION ADULT - IMPAIRMENTS FOUND, PT EVAL
aerobic capacity/endurance/gait, locomotion, and balance
aerobic capacity/endurance/gait, locomotion, and balance/muscle strength

## 2020-02-25 NOTE — PROGRESS NOTE ADULT - SUBJECTIVE AND OBJECTIVE BOX
S: Patient doing well, no acute complaints. Tolerating diet with no nausea/vomiting. Continues to have well formed nonbloody bowel movements.     O: Vital Signs  T(C): 36.9 (02-25 @ 09:55), Max: 37.3 (02-24 @ 21:06)  HR: 95 (02-25 @ 09:55) (91 - 104)  BP: 99/57 (02-25 @ 09:55) (99/57 - 115/67)  RR: 17 (02-25 @ 09:55) (17 - 18)  SpO2: 93% (02-25 @ 09:55) (93% - 98%)  02-24-20 @ 07:01  -  02-25-20 @ 07:00  --------------------------------------------------------  IN: 2044 mL / OUT: 1100 mL / NET: 944 mL    02-25-20 @ 07:01  -  02-25-20 @ 12:14  --------------------------------------------------------  IN: 250 mL / OUT: 0 mL / NET: 250 mL      General: alert and oriented, NAD  Resp: airway patent, respirations unlabored  CVS: regular rate and rhythm  Abdomen: non-distended, soft, non-tender, dressings with minimal purulent stainings, dressings changed in am rounds  Extremities: no edema  Skin: warm, dry, appropriate color                          8.2    8.91  )-----------( 389      ( 24 Feb 2020 07:16 )             25.6   02-25    132<L>  |  98  |  20  ----------------------------<  112<H>  4.5   |  23  |  0.46<L>    Ca    8.8      25 Feb 2020 06:04  Phos  4.4     02-25  Mg     1.8     02-25    TPro  6.9  /  Alb  2.9<L>  /  TBili  0.3  /  DBili  x   /  AST  20  /  ALT  64<H>  /  AlkPhos  117  02-25

## 2020-02-25 NOTE — PHYSICAL THERAPY INITIAL EVALUATION ADULT - IMPAIRMENTS CONTRIBUTING TO GAIT DEVIATIONS, PT EVAL
impaired postural control/impaired balance/decreased strength/mutliple standing rest breaks 2* decr endurance
impaired balance/decreased endurance/decreased strength/impaired postural control

## 2020-02-25 NOTE — PHYSICAL THERAPY INITIAL EVALUATION ADULT - GAIT DEVIATIONS NOTED, PT EVAL
compensates for L drop foot- incr L hip flex and knee flex/decreased weight-shifting ability/decreased step length/decreased randi
increased time in double stance/decreased randi/decreased velocity of limb motion/decreased weight-shifting ability

## 2020-02-25 NOTE — PHYSICAL THERAPY INITIAL EVALUATION ADULT - PERTINENT HX OF CURRENT PROBLEM, REHAB EVAL
75 yo M p/w abdominal pain, nausea, hematemesis, and poor PO intake for ~2-3 days. Labs significant for an CHI w/ Cr 2.74 and lactate of 9.4. He was also notably tachycardic to the 110s and hypotensive w/ SBP in the 70s. S/p Ex-lap with MIRYAM, enterotomy for bowel decompression and Abthera VAC placement 12/6. RTOR on 12/8 and underwent take down of Abthera, washout and re-application of the Abthera vac. RTOR on 12/10 night for colectomy. XRay Abd 12/15: Nonobstructive bowel gas pattern.
73 yo M p/w abdominal pain, nausea, hematemesis, and poor PO intake for ~2-3 days. Labs significant for an CHI w/ Cr 2.74 and lactate of 9.4. He was also notably tachycardic to the 110s and hypotensive w/ SBP in the 70s. S/p Ex-lap with MIRYAM, enterotomy for bowel decompression and Abthera VAC placement 12/6. RTOR on 12/8 and underwent take down of Abthera, washout and re-application of the Abthera vac. RTOR on 12/10 night for colectomy. XRay Abd 12/15: Nonobstructive bowel gas pattern.

## 2020-02-25 NOTE — PROGRESS NOTE ADULT - SUBJECTIVE AND OBJECTIVE BOX
Subjective: Patient seen and examined. No new events except as noted.     REVIEW OF SYSTEMS:    CONSTITUTIONAL: No weakness, fevers or chills  EYES/ENT: No visual changes;  No vertigo or throat pain   NECK: No pain or stiffness  RESPIRATORY: No cough, wheezing, hemoptysis; No shortness of breath  CARDIOVASCULAR: No chest pain or palpitations  GASTROINTESTINAL: No abdominal or epigastric pain. No nausea, vomiting, or hematemesis; No diarrhea or constipation. No melena or hematochezia.  GENITOURINARY: No dysuria, frequency or hematuria  NEUROLOGICAL: No numbness or weakness  SKIN: No itching, burning, rashes, or lesions   All other review of systems is negative unless indicated above.    MEDICATIONS:  MEDICATIONS  (STANDING):  albuterol/ipratropium for Nebulization. 3 milliLiter(s) Nebulizer every 6 hours  budesonide 160 MICROgram(s)/formoterol 4.5 MICROgram(s) Inhaler 2 Puff(s) Inhalation two times a day  chlorhexidine 2% Cloths 1 Application(s) Topical <User Schedule>  chlorhexidine 4% Liquid 1 Application(s) Topical <User Schedule>  clobetasol 0.05% Ointment 1 Application(s) Topical two times a day  dronabinol 5 milliGRAM(s) Oral daily  enoxaparin Injectable 40 milliGRAM(s) SubCutaneous daily  fat emulsion (Fish Oil and Plant Based) 20% Infusion 10.4 mL/Hr (10.4 mL/Hr) IV Continuous <Continuous>  guaiFENesin  milliGRAM(s) Oral every 12 hours  metoprolol tartrate 12.5 milliGRAM(s) Oral every 12 hours  pantoprazole   Suspension 40 milliGRAM(s) Oral daily  Parenteral Nutrition - Adult 1 Each (50 mL/Hr) TPN Continuous <Continuous>  tamsulosin 0.4 milliGRAM(s) Oral at bedtime  triamcinolone 0.1% Ointment 1 Application(s) Topical two times a day      PHYSICAL EXAM:  T(C): 36.9 (02-25-20 @ 09:55), Max: 37.3 (02-24-20 @ 21:06)  HR: 95 (02-25-20 @ 09:55) (91 - 104)  BP: 99/57 (02-25-20 @ 09:55) (99/57 - 115/67)  RR: 17 (02-25-20 @ 09:55) (17 - 18)  SpO2: 93% (02-25-20 @ 09:55) (93% - 98%)  Wt(kg): --  I&O's Summary    24 Feb 2020 07:01  -  25 Feb 2020 07:00  --------------------------------------------------------  IN: 2044 mL / OUT: 1100 mL / NET: 944 mL    25 Feb 2020 07:01  -  25 Feb 2020 10:44  --------------------------------------------------------  IN: 250 mL / OUT: 0 mL / NET: 250 mL          Appearance: Normal	  HEENT:   Normal oral mucosa, PERRL, EOMI	  Lymphatic: No lymphadenopathy , no edema  Cardiovascular: Normal S1 S2, No JVD, No murmurs , Peripheral pulses palpable 2+ bilaterally  Respiratory: Lungs clear to auscultation, normal effort 	  Gastrointestinal:  Soft, Non-tender, + BS	  Skin: No rashes, No ecchymoses, No cyanosis, warm to touch  Musculoskeletal: Normal range of motion, normal strength  Psychiatry:  Mood & affect appropriate  Ext: No edema      LABS:    CARDIAC MARKERS:                                8.2    8.91  )-----------( 389      ( 24 Feb 2020 07:16 )             25.6     02-25    132<L>  |  98  |  20  ----------------------------<  112<H>  4.5   |  23  |  0.46<L>    Ca    8.8      25 Feb 2020 06:04  Mg     1.8     02-25    TPro  6.9  /  Alb  2.9<L>  /  TBili  0.3  /  DBili  x   /  AST  20  /  ALT  64<H>  /  AlkPhos  117  02-25    proBNP:   Lipid Profile:   HgA1c:   TSH:             TELEMETRY: 	    ECG:  	  RADIOLOGY:   DIAGNOSTIC TESTING:  [ ] Echocardiogram:  [ ]  Catheterization:  [ ] Stress Test:    OTHER:

## 2020-02-25 NOTE — PHYSICAL THERAPY INITIAL EVALUATION ADULT - TRANSFER TRAINING, PT EVAL
GOAL: Pt will perform all transfers independently in 4 weeks.
GOAL: pt will complete all transfers ind with rolling walker in 4wks.

## 2020-02-25 NOTE — PHYSICAL THERAPY INITIAL EVALUATION ADULT - BED MOBILITY TRAINING, PT EVAL
GOAL: Pt will perform all bed mobility independently in 4 weeks.
GOAL: pt will complete all bed mob ind in 4wks.

## 2020-02-25 NOTE — PROGRESS NOTE ADULT - SUBJECTIVE AND OBJECTIVE BOX
[facility] NUTRITION SUPPORT / TPN -- FOLLOW UP NOTE  --------------------------------------------------------------------------------    24 hour events/subjective:  (+)flatus  (+)diarrhea x2 unchanged  Appetite better- ate eggs and sausage  Pt having issues w/ dentures, so he's choosing calorie dense liquids & soft foods       doesn't want mechanical soft or Full liquid diet  Pt on Protonix suspension QD  no abdominal Pain  OOB ad rachel-  walks around the unit Qafternoon  No n/v  No HA/ dizziness, syncope, lightheadedness, loss of balance  No cough/ cp/ palp/dyspnea/ sob  No f/c/s      Diet:  Diet, Low Fiber:   Supplement Feeding Modality:  Oral  Ensure Clear Cans or Servings Per Day:  1       Frequency:  Three Times a day  Ensure Enlive Cans or Servings Per Day:  1       Frequency:  Three Times a day (02-19-20 @ 10:36)      Appetite: [ x ]Poor [  ]Adequate [  ]Good  Caloric intake:  [  x ]  Adequate   [   ] Inadequate    ROS: General/ GI see HPI  all other systems negative      ALLERGIES & MEDICATIONS  --------------------------------------------------------------------------------  ALLERGIES  Allergies    IV Contrast (Hives)    Intolerances        INTOLERANCES    STANDING INPATIENT MEDICATIONS    albuterol/ipratropium for Nebulization. 3 milliLiter(s) Nebulizer every 6 hours  budesonide 160 MICROgram(s)/formoterol 4.5 MICROgram(s) Inhaler 2 Puff(s) Inhalation two times a day  chlorhexidine 2% Cloths 1 Application(s) Topical <User Schedule>  chlorhexidine 4% Liquid 1 Application(s) Topical <User Schedule>  clobetasol 0.05% Ointment 1 Application(s) Topical two times a day  dronabinol 5 milliGRAM(s) Oral daily  enoxaparin Injectable 40 milliGRAM(s) SubCutaneous daily  fat emulsion (Fish Oil and Plant Based) 20% Infusion 10.4 mL/Hr IV Continuous <Continuous>  guaiFENesin  milliGRAM(s) Oral every 12 hours  metoprolol tartrate 12.5 milliGRAM(s) Oral every 12 hours  pantoprazole   Suspension 40 milliGRAM(s) Oral daily  Parenteral Nutrition - Adult 1 Each TPN Continuous <Continuous>  tamsulosin 0.4 milliGRAM(s) Oral at bedtime  triamcinolone 0.1% Ointment 1 Application(s) Topical two times a day      PRN INPATIENT MEDICATION  acetaminophen   Tablet .. 975 milliGRAM(s) Oral every 6 hours PRN  oxyCODONE    IR 5 milliGRAM(s) Oral every 4 hours PRN        VITALS/PHYSICAL EXAM  --------------------------------------------------------------------------------  T(C): 36.9 (02-25-20 @ 04:32), Max: 37.3 (02-24-20 @ 21:06)  HR: 91 (02-25-20 @ 04:32) (91 - 104)  BP: 108/64 (02-25-20 @ 04:32) (104/66 - 115/67)  RR: 18 (02-25-20 @ 04:32) (18 - 18)  SpO2: 95% (02-25-20 @ 04:32) (95% - 98%)  Wt(kg): --        02-24-20 @ 07:01  -  02-25-20 @ 07:00  --------------------------------------------------------  IN: 2044 mL / OUT: 1100 mL / NET: 944 mL    PHYSICAL EXAM:  --------------------------------------------------------------------------------  	Gen: guarded but stable, A&Ox3  	HEENT: NC/AT, PERRL, supple neck, trachea midline, mucosa moist              GI: (+) BS, nondistended, nontender                    abdominal dressing c/d/i              MSK: FROM x4, no contractures nor deformities                   LLE w/ brace   	Vascular: Equally Warm,  no edema, no clubbing, cyanosis,                      RUE PICC w/o sx infection  	Neuro: No focal deficits, grossly intact sensation, & strength   	Psych: Normal affect and mood              skin: moist, good turgor, erythematous pinpoint rash chest/ back          LABS/ CULTURES/ RADIOLOGY:              8.2    8.91  >-----------<  389      [02-24-20 @ 07:16]              25.6     132  |  98  |  20  ----------------------------<  112      [02-25-20 @ 06:04]  4.5   |  23  |  0.46        Ca     8.8     [02-25-20 @ 06:04]      iCa    1.27     [02-25 @ 06:06]      Mg     1.8     [02-25-20 @ 06:04]    TPro  6.9  /  Alb  2.9  /  TBili  0.3  /  DBili  x   /  AST  20  /  ALT  64  /  AlkPhos  117  [02-25-20 @ 06:04]    Prealbumin, Serum: 21 mg/dL (02-25-20 @ 09:24)  Prealbumin, Serum: 19 mg/dL (02-20-20 @ 09:06)  Prealbumin, Serum: 16 mg/dL (02-15-20 @ 12:30)  Prealbumin, Serum: 17 mg/dL (02-10-20 @ 09:15)  Prealbumin, Serum: 21 mg/dL (02-03-20 @ 23:50) [facility] NUTRITION SUPPORT / TPN -- FOLLOW UP NOTE  --------------------------------------------------------------------------------    24 hour events/subjective:  (+)flatus  (+)diarrhea x2 unchanged  Appetite better- ate eggs and sausage  Pt having issues w/ dentures, so he's choosing calorie dense liquids & soft foods       doesn't want mechanical soft or Full liquid diet  Pt on Protonix suspension QD  no abdominal Pain  OOB ad rachel-  walks around the unit Qafternoon  No n/v  No HA/ dizziness, syncope, lightheadedness, loss of balance  No cough/ cp/ palp/dyspnea/ sob  No f/c/s      Diet:  Diet, Low Fiber:   Supplement Feeding Modality:  Oral  Ensure Clear Cans or Servings Per Day:  1       Frequency:  Three Times a day  Ensure Enlive Cans or Servings Per Day:  1       Frequency:  Three Times a day (02-19-20 @ 10:36)      Appetite: [ x ]Poor [  ]Adequate [  ]Good  Caloric intake:  [  x ]  Adequate   [   ] Inadequate    ROS: General/ GI see HPI  all other systems negative      ALLERGIES & MEDICATIONS  --------------------------------------------------------------------------------  ALLERGIES  IV Contrast (Hives)      STANDING INPATIENT MEDICATIONS  albuterol/ipratropium for Nebulization. 3 milliLiter(s) Nebulizer every 6 hours  budesonide 160 MICROgram(s)/formoterol 4.5 MICROgram(s) Inhaler 2 Puff(s) Inhalation two times a day  chlorhexidine 2% Cloths 1 Application(s) Topical <User Schedule>  chlorhexidine 4% Liquid 1 Application(s) Topical <User Schedule>  clobetasol 0.05% Ointment 1 Application(s) Topical two times a day  dronabinol 5 milliGRAM(s) Oral daily  enoxaparin Injectable 40 milliGRAM(s) SubCutaneous daily  fat emulsion (Fish Oil and Plant Based) 20% Infusion 10.4 mL/Hr IV Continuous <Continuous>  guaiFENesin  milliGRAM(s) Oral every 12 hours  metoprolol tartrate 12.5 milliGRAM(s) Oral every 12 hours  pantoprazole   Suspension 40 milliGRAM(s) Oral daily  Parenteral Nutrition - Adult 1 Each TPN Continuous <Continuous>  tamsulosin 0.4 milliGRAM(s) Oral at bedtime  triamcinolone 0.1% Ointment 1 Application(s) Topical two times a day      PRN INPATIENT MEDICATION  acetaminophen   Tablet .. 975 milliGRAM(s) Oral every 6 hours PRN  oxyCODONE    IR 5 milliGRAM(s) Oral every 4 hours PRN        VITALS/PHYSICAL EXAM  --------------------------------------------------------------------------------  T(C): 36.9 (02-25-20 @ 04:32), Max: 37.3 (02-24-20 @ 21:06)  HR: 91 (02-25-20 @ 04:32) (91 - 104)  BP: 108/64 (02-25-20 @ 04:32) (104/66 - 115/67)  RR: 18 (02-25-20 @ 04:32) (18 - 18)  SpO2: 95% (02-25-20 @ 04:32) (95% - 98%)  Wt(kg): --        02-24-20 @ 07:01  -  02-25-20 @ 07:00  --------------------------------------------------------  IN: 2044 mL / OUT: 1100 mL / NET: 944 mL    PHYSICAL EXAM:  --------------------------------------------------------------------------------  	Gen: guarded but stable, A&Ox3  	HEENT: NC/AT, PERRL, supple neck, trachea midline, mucosa moist              GI: (+) BS, nondistended, nontender                    abdominal dressing c/d/i              MSK: FROM x4, no contractures nor deformities              Vascular: Equally Warm,  no edema, no clubbing, cyanosis,                      RUE PICC w/o sx infection  	Neuro: No focal deficits, grossly intact sensation, & strength   	Psych: Normal affect and mood              skin: moist, good turgor, erythematous pinpoint rash chest/ back          LABS/ CULTURES/ RADIOLOGY:              8.2    8.91  >-----------<  389      [02-24-20 @ 07:16]              25.6     132  |  98  |  20  ----------------------------<  112      [02-25-20 @ 06:04]  4.5   |  23  |  0.46        Ca     8.8     [02-25-20 @ 06:04]      iCa    1.27     [02-25 @ 06:06]      Mg     1.8     [02-25-20 @ 06:04]      Phos  4.4      [02.25.20 @ 06:04]        TPro  6.9  /  Alb  2.9  /  TBili  0.3  /  DBili  x   /  AST  20  /  ALT  64  /  AlkPhos  117  [02-25-20 @ 06:04]    Prealbumin, Serum: 21 mg/dL (02-25-20 @ 09:24)  Prealbumin, Serum: 19 mg/dL (02-20-20 @ 09:06)  Prealbumin, Serum: 16 mg/dL (02-15-20 @ 12:30)  Prealbumin, Serum: 17 mg/dL (02-10-20 @ 09:15)  Prealbumin, Serum: 21 mg/dL (02-03-20 @ 23:50)    Triglycerides, Serum: 36 mg/dL (02.25.20 @ 06:04)  Triglycerides, Serum: 38 mg/dL (02.20.20 @ 05:50)  Triglycerides, Serum: 59 mg/dL (02.10.20 @ 01:03)  Triglycerides, Serum: 59 mg/dL (02.03.20 @ 00:24)

## 2020-02-25 NOTE — PROGRESS NOTE ADULT - ASSESSMENT
A/P: 75 year old male w/ PMH of COPD and EtOH dependence with PSH/o appy, prostate surgery, & spine surgery  septic shock and high grade SBO s/p exploratory laparotomy, lysis of adhesions, decompression of bowel via enterotomy w/ primary repair, and Abthera VAC placement on 12/6; s/p take down of Abthera, washout and re-application of the Abthera vac on 12/8. Now s/p SBR and end ileostomy on 12/10.   TPN initally consulted to assist w/ management of pt's nutrition in pt with SGS/malabsorption and had high Ileostomy output.  Pt s/p Lt Chest Pigtail for effusion in IR with persistent high output on 1/10/2020. Pt had VATs & placement of Left PleurX catheter. Post op pt developed hemothorax and Respiratory Distress.  Pt s/p Lt chest Evacuation of 2L blood w/ placement of CT x2 on 1/15/2020.  Lt Pleural Effusion resolved and pt doing well after CT removed.  Pt also noted with Rt PNA vs Pleural Effusion that has resolved.  Pt is s/p Ex Lap, MIRYAM, & Closure of End Ileostomy on 2/4/2020.      Pt w/ improving severe Protein-Calorie Malnutrition- needing to be discharge on TPN               improving PreAlbumin, but not sufficient enough indicating on going Malabsorption               s/p Reversal of Ileostomy in pt w/ Short Gut Syndrome   If pt doesn't make sufficient progress he may require life long TPN or be a candidate for Gattex     Pt tolerating TPN at 1/2 strength appetite improving on Marinol- tolerating LRD               improving AST/ ALT, with nL LDH, Alk Phos, CPK- continue to monitor          1/2 Strength TPN: Amino Acids 60g, Dextrose 140g, Lipids 25g in 1200mL               with 3mL MTE & 10mL MVI     -SIADH - HypoNA & urine electrolytes confirmation          improving w/ decreased volume, continue to monitor          pt not edematous, making good urine  -HypoCa- improving w/increased Ca to 16mEq in TPN continue to monitor  -LowK - improving w/ 80mEq KCl in TPN, continue to monitor  -LowMg- improving with increased Mg to 16mEq  -HyperPhos- improving w/ NaPhos of 15mMol & will continue to monitor   -Protonix for recent h/o GIB & Gastritis noted on EGD-  requiring increased Mg               no further bloody BM & H&H remains stable  -Leukocytosis- resolved  -Concern for ongoing malabsrption- pt w/ Semisolid & loose BMs        Pt may require on going 1/2strength TPN when discharged home        Repeat Fat Soluble Vitamin studies -sent /pending        TPN w/ MVI to compensate for low Vit E & A        Vit D levels low- being supplemented w/Ergocalciferol         Vit K INR/ PT near normal suggestive that it is being absorbed   -Strict Intake and Output - continue to closely monitor  -Good glycemic control-  Fingersticks & ISS coverage -   -Weights three times a week  -Daily CMP, Mg, Ionized Ca, Phosphorus,        and Weekly Triglycerides and Pre-albumin  Continue as per Surgery, will follow with you, D/w primary team    Andreina Hubbard PA-C  TPN team, pager [phone]  D/w Dr Chacko  & [facility] Siegel

## 2020-02-25 NOTE — PHYSICAL THERAPY INITIAL EVALUATION ADULT - PRECAUTIONS/LIMITATIONS, REHAB EVAL
Hospital course c/b pneumothorax, hyperglycemia, delirium, s/p L CT placement 12/30; RRT 1/13 hypoxia with tx to SICU, OR 1/15 for clot evac & VATS, now s/p ileostomy reversal 2/4/20 postop c/b bloody BM and anemia, required transfusion, now resolved/fall precautions
fall precautions

## 2020-02-25 NOTE — PROGRESS NOTE ADULT - SUBJECTIVE AND OBJECTIVE BOX
Follow-up Pulm Progress Note    No new respiratory events overnight  Reports slightly less SOB when ambulating   Sats 98% RA    Medications:  MEDICATIONS  (STANDING):  albuterol/ipratropium for Nebulization. 3 milliLiter(s) Nebulizer every 6 hours  budesonide 160 MICROgram(s)/formoterol 4.5 MICROgram(s) Inhaler 2 Puff(s) Inhalation two times a day  chlorhexidine 2% Cloths 1 Application(s) Topical <User Schedule>  chlorhexidine 4% Liquid 1 Application(s) Topical <User Schedule>  clobetasol 0.05% Ointment 1 Application(s) Topical two times a day  dronabinol 5 milliGRAM(s) Oral daily  enoxaparin Injectable 40 milliGRAM(s) SubCutaneous daily  fat emulsion (Fish Oil and Plant Based) 20% Infusion 10.4 mL/Hr (10.4 mL/Hr) IV Continuous <Continuous>  guaiFENesin  milliGRAM(s) Oral every 12 hours  metoprolol tartrate 12.5 milliGRAM(s) Oral every 12 hours  pantoprazole   Suspension 40 milliGRAM(s) Oral daily  Parenteral Nutrition - Adult 1 Each TPN Continuous <Continuous>  Parenteral Nutrition - Adult 1 Each (50 mL/Hr) TPN Continuous <Continuous>  tamsulosin 0.4 milliGRAM(s) Oral at bedtime  triamcinolone 0.1% Ointment 1 Application(s) Topical two times a day    MEDICATIONS  (PRN):  acetaminophen   Tablet .. 975 milliGRAM(s) Oral every 6 hours PRN Mild Pain (1 - 3)  oxyCODONE    IR 5 milliGRAM(s) Oral every 4 hours PRN Moderate Pain (4 - 6)          Vital Signs Last 24 Hrs  T(C): 36.6 (25 Feb 2020 14:00), Max: 37.3 (24 Feb 2020 21:06)  T(F): 97.9 (25 Feb 2020 14:00), Max: 99.2 (24 Feb 2020 21:06)  HR: 105 (25 Feb 2020 14:00) (91 - 105)  BP: 111/68 (25 Feb 2020 14:00) (99/57 - 115/67)  BP(mean): --  RR: 18 (25 Feb 2020 14:00) (17 - 18)  SpO2: 95% (25 Feb 2020 14:00) (93% - 96%)          02-24 @ 07:01  -  02-25 @ 07:00  --------------------------------------------------------  IN: 2044 mL / OUT: 1100 mL / NET: 944 mL          LABS:                        8.2    8.91  )-----------( 389      ( 24 Feb 2020 07:16 )             25.6     02-25    132<L>  |  98  |  20  ----------------------------<  112<H>  4.5   |  23  |  0.46<L>    Ca    8.8      25 Feb 2020 06:04  Phos  4.4     02-25  Mg     1.8     02-25    TPro  6.9  /  Alb  2.9<L>  /  TBili  0.3  /  DBili  x   /  AST  20  /  ALT  64<H>  /  AlkPhos  117  02-25        Physical Examination:  PULM: decreased BS throughout   CVS: S1, S2 heard    RADIOLOGY REVIEWED  CXR: grossly nic

## 2020-02-25 NOTE — CHART NOTE - NSCHARTNOTEFT_GEN_A_CORE
Nutrition Follow Up Note    Patient seen for: nutrition/TPN Team follow up    Source: patient, medical record, TPN Team rounds    Chart reviewed, events noted. Pt S/P ileostomy reversal (2020).     Nutrition Status: Severe Malnutrition. Pt with 150 cm small bowel remaining after GI surgery x 3. TPN initiated . Pt has been tolerating a po diet since . TPN reduced to half goal on  in setting of adequate po intake with questionable absorption. Discharge planning in progress for rehab; TPN to be compressed to 12-hours tonight.    Adjustments to TPN: /2 dose, transitioned to 12-hour cycle on   Total volume: reduced to 1.2L   SMOF lipid (grams): reduced to 25  Amino Acids (grams): reduced to 60  Dextrose (grams): reduced to 140  Insulin (units): none; BG well managed  NaCl (mEq): increased to 80 (no change)  Na acetate (mEq): reduced to zero (no change)  Na Phos (mmol): reduced to 15  KCL (mEq): increased to 80 (no change)  Calcium gluconate (mEq): increased to 16 (no change)  Mg sulfate (mEq): increased to 16 (no change)  MTE-5 (ml): 3  MVI (ml): 10     Diet () Low Fiber; 3 servings Ensure Clear, 3 servings Ensure Enlive    PO Intake: Pt is consuming large portions of breakfast (omelette, sausage), and moderate portions of lunch and dinner. Pt is drinking 2 Ensure Clear and 2 Ensure Enlive daily.    Parenteral Nutrition: (): 1.2 L infusing at 50ml/hr (60Gm amino acids, 140Gm dextrose, 25Gm SMOF lipids); to provide: 966cal (18cal/Kg and 1.1Gm protein/Kg dosing wt 54.2Kg).    Dextrose Infusion Rate (12-hour cycle): 3.6 mg/Kg/min  Lipid Infusion Rate: 0.46 Gm/Kg/day; 0.04 Gm/Kg/hr    Last BM: 2/24 x2    Urine output x 24-hours: 1100ml    Daily Weight in k.6 (-); stable wt in-house    Drug Dosing Weight  Weight (kg): 54 (2020 07:14)  BMI (kg/m2): 17.6 (2020 07:14)    Pertinent Medications: MEDICATIONS  (STANDING):  albuterol/ipratropium for Nebulization. 3 milliLiter(s) Nebulizer every 6 hours  budesonide 160 MICROgram(s)/formoterol 4.5 MICROgram(s) Inhaler 2 Puff(s) Inhalation two times a day  chlorhexidine 2% Cloths 1 Application(s) Topical <User Schedule>  chlorhexidine 4% Liquid 1 Application(s) Topical <User Schedule>  clobetasol 0.05% Ointment 1 Application(s) Topical two times a day  dronabinol 5 milliGRAM(s) Oral daily  enoxaparin Injectable 40 milliGRAM(s) SubCutaneous daily  fat emulsion (Fish Oil and Plant Based) 20% Infusion 10.4 mL/Hr (10.4 mL/Hr) IV Continuous <Continuous>  guaiFENesin  milliGRAM(s) Oral every 12 hours  metoprolol tartrate 12.5 milliGRAM(s) Oral every 12 hours  pantoprazole   Suspension 40 milliGRAM(s) Oral daily  Parenteral Nutrition - Adult 1 Each (50 mL/Hr) TPN Continuous <Continuous>  tamsulosin 0.4 milliGRAM(s) Oral at bedtime  triamcinolone 0.1% Ointment 1 Application(s) Topical two times a day    MEDICATIONS  (PRN):  acetaminophen   Tablet .. 975 milliGRAM(s) Oral every 6 hours PRN Mild Pain (1 - 3)  oxyCODONE    IR 5 milliGRAM(s) Oral every 4 hours PRN Moderate Pain (4 - 6)    LABS:    @ 09:24: Prealbumin 21,   @ 06:04: Sodium 132<L>, Potassium 4.5, Chloride 98, Calcium 8.8, Magnesium 1.8, Phosphorus --, BUN 20, Creatinine 0.46<L>, <H>, Alk Phos 117, ALT/SGPT 64<H>, AST/SGOT 20,  Total Protein 6.9, Albumin 2.9<L>,  Total Bilirubin 0.3,     Triglycerides, Serum: 36 mg/dL (20 @ 06:04)  Triglycerides, Serum: 38 mg/dL (20 @ 05:50)  Triglycerides, Serum: 44 mg/dL (02-15-20 @ 07:17)  Triglycerides, Serum: 59 mg/dL (02-10-20 @ 01:03)  Triglycerides, Serum: 53 mg/dL (20 @ 21:38)  Triglycerides, Serum: 59 mg/dL (20 @ 00:24)  Triglycerides, Serum: 61 mg/dL (20 @ 01:41)    Skin per nursing documentation: no pressure injuries documented  Edema: none noted    Estimated Needs: based on dosing wt 54.2Kg, with consideration for TPN, malnutrition  1780-2165 gregorio/day (25-30cal/Kg)   Gm protein/day (1.8-2.2Gm/Kg)     Previous Nutrition Diagnosis: Severe malnutrition  Nutrition Diagnosis is: ongoing, being addressed with half-dose TPN, therapeutic diet, and oral supplements    New Nutrition Diagnosis: none     Interventions: nutrition care plan in progress    Recommend  1) Half-dose TPN per TPN Team/Nutrition assessment; plan to compress to 12-hour cycle on   2) Continue Low Fiber diet   3) Continue 3 servings apple Ensure Clear (240cal, 8Gm protein per serving)  4) Continue 3 servings chocolate Ensure Enlive (350cal, 20Gm protein per 8oz serving)    Monitoring and Evaluation:     Continue to monitor nutrition provision and tolerance, weights, labs, skin integrity.    RD remains available upon request and will follow up per protocol.    Sowmya Graham MS RD CDN [facility], Pager # [phone]. Nutrition Follow Up Note    Patient seen for: nutrition/TPN Team follow up    Source: patient, medical record, TPN Team rounds    Chart reviewed, events noted. Pt S/P ileostomy reversal (2020).     Nutrition Status: Severe Malnutrition. Pt with 150 cm small bowel remaining after GI surgery x 3. TPN initiated . Pt has been tolerating a po diet since . TPN reduced to half goal on  in setting of adequate po intake with questionable absorption. Discharge planning in progress for rehab; TPN to be compressed to 12-hours tonight.    Adjustments to TPN: /2 dose, transitioned to 12-hour cycle on   Total volume: reduced to 1.2L   SMOF lipid (grams): reduced to 25  Amino Acids (grams): reduced to 60  Dextrose (grams): reduced to 140  Insulin (units): none; BG well managed  NaCl (mEq): increased to 80 (no change)  Na acetate (mEq): reduced to zero (no change)  Na Phos (mmol): reduced to 15  KCL (mEq): increased to 80 (no change)  Calcium gluconate (mEq): increased to 16 (no change)  Mg sulfate (mEq): increased to 16 (no change)  MTE-5 (ml): 3  MVI (ml): 10     Diet () Low Fiber; 3 servings Ensure Clear, 3 servings Ensure Enlive    PO Intake: Pt is consuming >75% of breakfast (omelette, sausage), and >50% of lunch and dinner. Pt is drinking 2 Ensure Clear and 2 Ensure Enlive daily.    Parenteral Nutrition: (): 1.2 L infusing at 50ml/hr (60Gm amino acids, 140Gm dextrose, 25Gm SMOF lipids); to provide: 966cal (18cal/Kg and 1.1Gm protein/Kg dosing wt 54.2Kg).    Dextrose Infusion Rate (12-hour cycle): 3.6 mg/Kg/min  Lipid Infusion Rate (12-hour cycle): 0.46 Gm/Kg/day; 0.04 Gm/Kg/hr    Last BM: 2/24 x2    Urine output x 24-hours: 1100ml    Daily Weight in k.6 (-); stable wt in-house    Drug Dosing Weight  Weight (kg): 54 (2020 07:14)  BMI (kg/m2): 17.6 (2020 07:14)    Pertinent Medications: MEDICATIONS  (STANDING):  albuterol/ipratropium for Nebulization. 3 milliLiter(s) Nebulizer every 6 hours  budesonide 160 MICROgram(s)/formoterol 4.5 MICROgram(s) Inhaler 2 Puff(s) Inhalation two times a day  chlorhexidine 2% Cloths 1 Application(s) Topical <User Schedule>  chlorhexidine 4% Liquid 1 Application(s) Topical <User Schedule>  clobetasol 0.05% Ointment 1 Application(s) Topical two times a day  dronabinol 5 milliGRAM(s) Oral daily  enoxaparin Injectable 40 milliGRAM(s) SubCutaneous daily  fat emulsion (Fish Oil and Plant Based) 20% Infusion 10.4 mL/Hr (10.4 mL/Hr) IV Continuous <Continuous>  guaiFENesin  milliGRAM(s) Oral every 12 hours  metoprolol tartrate 12.5 milliGRAM(s) Oral every 12 hours  pantoprazole   Suspension 40 milliGRAM(s) Oral daily  Parenteral Nutrition - Adult 1 Each (50 mL/Hr) TPN Continuous <Continuous>  tamsulosin 0.4 milliGRAM(s) Oral at bedtime  triamcinolone 0.1% Ointment 1 Application(s) Topical two times a day    MEDICATIONS  (PRN):  acetaminophen   Tablet .. 975 milliGRAM(s) Oral every 6 hours PRN Mild Pain (1 - 3)  oxyCODONE    IR 5 milliGRAM(s) Oral every 4 hours PRN Moderate Pain (4 - 6)    LABS:    @ 09:24: Prealbumin 21,   @ 06:04: Sodium 132<L>, Potassium 4.5, Chloride 98, Calcium 8.8, Magnesium 1.8, Phosphorus --, BUN 20, Creatinine 0.46<L>, <H>, Alk Phos 117, ALT/SGPT 64<H>, AST/SGOT 20,  Total Protein 6.9, Albumin 2.9<L>,  Total Bilirubin 0.3,     Triglycerides, Serum: 36 mg/dL (20 @ 06:04)  Triglycerides, Serum: 38 mg/dL (02-20-20 @ 05:50)  Triglycerides, Serum: 44 mg/dL (02-15-20 @ 07:17)  Triglycerides, Serum: 59 mg/dL (02-10-20 @ 01:03)  Triglycerides, Serum: 53 mg/dL (20 @ 21:38)  Triglycerides, Serum: 59 mg/dL (20 @ 00:24)  Triglycerides, Serum: 61 mg/dL (20 @ 01:41)    Skin per nursing documentation: no pressure injuries documented  Edema: none noted    Estimated Needs: based on dosing wt 54.2Kg, with consideration for half-dose TPN, malnutrition  2517-9220 gregorio/day (25-30cal/Kg)  87-98 Gm protein/day (1.6-1.8Gm/Kg)     Previous Nutrition Diagnosis: Severe malnutrition  Nutrition Diagnosis is: ongoing, being addressed with half-dose TPN, therapeutic diet, and oral supplements    New Nutrition Diagnosis: none     Interventions: nutrition care plan in progress    Recommend  1) Continue half-dose TPN per TPN Team/Nutrition assessment; plan to compress to 12-hour cycle on  in anticipation of discharge  2) Continue Low Fiber diet   3) Continue 3 servings apple Ensure Clear (240cal, 8Gm protein per serving)  4) Continue 3 servings chocolate Ensure Enlive (350cal, 20Gm protein per 8oz serving)    Monitoring and Evaluation:     Continue to monitor nutrition provision and tolerance, weights, labs, skin integrity.    RD remains available upon request and will follow up per protocol.    Sowmya Graham, MS SPARKS CDN [facility], Pager # [phone].

## 2020-02-25 NOTE — PHYSICAL THERAPY INITIAL EVALUATION ADULT - IMPAIRMENTS CONTRIBUTING IMPAIRED BED MOBILITY, REHAB EVAL
impaired balance/decreased flexibility/decreased strength/impaired postural control
decreased strength

## 2020-02-25 NOTE — PHYSICAL THERAPY INITIAL EVALUATION ADULT - ACTIVE RANGE OF MOTION EXAMINATION, REHAB EVAL
no Active ROM deficits were identified
Left UE Active ROM was WFL (within functional limits)/Right UE Active ROM was WFL (within functional limits)/BUE and BLE WFL except hx L drop foot/Left LE Active ROM was WFL (within functional limits)/Right LE Active ROM was WFL (within functional limits)

## 2020-02-25 NOTE — PROGRESS NOTE ADULT - PROBLEM SELECTOR PLAN 1
likely 2nd to deconditioning  -Slowly improving   -CXR grossly clear  -No hypoxia with ambulation, sats maintained >92%  -LE duplex neg DVT 2/14  -Incentive spirometer use encouraged

## 2020-02-26 LAB
ALBUMIN SERPL ELPH-MCNC: 2.9 G/DL — LOW (ref 3.3–5)
ALP SERPL-CCNC: 110 U/L — SIGNIFICANT CHANGE UP (ref 40–120)
ALT FLD-CCNC: 50 U/L — HIGH (ref 10–45)
ANION GAP SERPL CALC-SCNC: 10 MMOL/L — SIGNIFICANT CHANGE UP (ref 5–17)
AST SERPL-CCNC: 16 U/L — SIGNIFICANT CHANGE UP (ref 10–40)
BILIRUB SERPL-MCNC: 0.3 MG/DL — SIGNIFICANT CHANGE UP (ref 0.2–1.2)
BUN SERPL-MCNC: 18 MG/DL — SIGNIFICANT CHANGE UP (ref 7–23)
CA-I BLD-SCNC: 1.25 MMOL/L — SIGNIFICANT CHANGE UP (ref 1.12–1.3)
CALCIUM SERPL-MCNC: 9 MG/DL — SIGNIFICANT CHANGE UP (ref 8.4–10.5)
CHLORIDE SERPL-SCNC: 101 MMOL/L — SIGNIFICANT CHANGE UP (ref 96–108)
CO2 SERPL-SCNC: 23 MMOL/L — SIGNIFICANT CHANGE UP (ref 22–31)
CREAT SERPL-MCNC: 0.44 MG/DL — LOW (ref 0.5–1.3)
GLUCOSE SERPL-MCNC: 107 MG/DL — HIGH (ref 70–99)
HCT VFR BLD CALC: 26.6 % — LOW (ref 39–50)
HGB BLD-MCNC: 8.2 G/DL — LOW (ref 13–17)
MAGNESIUM SERPL-MCNC: 1.8 MG/DL — SIGNIFICANT CHANGE UP (ref 1.6–2.6)
MCHC RBC-ENTMCNC: 28.6 PG — SIGNIFICANT CHANGE UP (ref 27–34)
MCHC RBC-ENTMCNC: 30.8 GM/DL — LOW (ref 32–36)
MCV RBC AUTO: 92.7 FL — SIGNIFICANT CHANGE UP (ref 80–100)
NRBC # BLD: 0 /100 WBCS — SIGNIFICANT CHANGE UP (ref 0–0)
PHOSPHATE SERPL-MCNC: 3.9 MG/DL — SIGNIFICANT CHANGE UP (ref 2.5–4.5)
PLATELET # BLD AUTO: 350 K/UL — SIGNIFICANT CHANGE UP (ref 150–400)
POTASSIUM SERPL-MCNC: 4.4 MMOL/L — SIGNIFICANT CHANGE UP (ref 3.5–5.3)
POTASSIUM SERPL-SCNC: 4.4 MMOL/L — SIGNIFICANT CHANGE UP (ref 3.5–5.3)
PROT SERPL-MCNC: 7.1 G/DL — SIGNIFICANT CHANGE UP (ref 6–8.3)
RBC # BLD: 2.87 M/UL — LOW (ref 4.2–5.8)
RBC # FLD: 13.8 % — SIGNIFICANT CHANGE UP (ref 10.3–14.5)
SODIUM SERPL-SCNC: 134 MMOL/L — LOW (ref 135–145)
WBC # BLD: 9.51 K/UL — SIGNIFICANT CHANGE UP (ref 3.8–10.5)
WBC # FLD AUTO: 9.51 K/UL — SIGNIFICANT CHANGE UP (ref 3.8–10.5)

## 2020-02-26 PROCEDURE — 99233 SBSQ HOSP IP/OBS HIGH 50: CPT

## 2020-02-26 RX ORDER — ELECTROLYTE SOLUTION,INJ
1 VIAL (ML) INTRAVENOUS
Refills: 0 | Status: DISCONTINUED | OUTPATIENT
Start: 2020-02-26 | End: 2020-02-26

## 2020-02-26 RX ORDER — I.V. FAT EMULSION 20 G/100ML
10.4 EMULSION INTRAVENOUS
Qty: 25 | Refills: 0 | Status: DISCONTINUED | OUTPATIENT
Start: 2020-02-26 | End: 2020-02-27

## 2020-02-26 RX ADMIN — ENOXAPARIN SODIUM 40 MILLIGRAM(S): 100 INJECTION SUBCUTANEOUS at 12:54

## 2020-02-26 RX ADMIN — BUDESONIDE AND FORMOTEROL FUMARATE DIHYDRATE 2 PUFF(S): 160; 4.5 AEROSOL RESPIRATORY (INHALATION) at 17:37

## 2020-02-26 RX ADMIN — TAMSULOSIN HYDROCHLORIDE 0.4 MILLIGRAM(S): 0.4 CAPSULE ORAL at 21:33

## 2020-02-26 RX ADMIN — OXYCODONE HYDROCHLORIDE 5 MILLIGRAM(S): 5 TABLET ORAL at 00:03

## 2020-02-26 RX ADMIN — PANTOPRAZOLE SODIUM 40 MILLIGRAM(S): 20 TABLET, DELAYED RELEASE ORAL at 13:00

## 2020-02-26 RX ADMIN — OXYCODONE HYDROCHLORIDE 5 MILLIGRAM(S): 5 TABLET ORAL at 23:41

## 2020-02-26 RX ADMIN — Medication 3 MILLILITER(S): at 05:46

## 2020-02-26 RX ADMIN — Medication 3 MILLILITER(S): at 23:12

## 2020-02-26 RX ADMIN — Medication 1 APPLICATION(S): at 05:43

## 2020-02-26 RX ADMIN — Medication 1 EACH: at 18:29

## 2020-02-26 RX ADMIN — I.V. FAT EMULSION 10.4 ML/HR: 20 EMULSION INTRAVENOUS at 18:29

## 2020-02-26 RX ADMIN — BUDESONIDE AND FORMOTEROL FUMARATE DIHYDRATE 2 PUFF(S): 160; 4.5 AEROSOL RESPIRATORY (INHALATION) at 05:46

## 2020-02-26 RX ADMIN — Medication 975 MILLIGRAM(S): at 23:41

## 2020-02-26 RX ADMIN — Medication 975 MILLIGRAM(S): at 23:12

## 2020-02-26 RX ADMIN — Medication 1 APPLICATION(S): at 17:37

## 2020-02-26 RX ADMIN — Medication 600 MILLIGRAM(S): at 17:36

## 2020-02-26 RX ADMIN — CHLORHEXIDINE GLUCONATE 1 APPLICATION(S): 213 SOLUTION TOPICAL at 13:03

## 2020-02-26 RX ADMIN — Medication 3 MILLILITER(S): at 17:36

## 2020-02-26 RX ADMIN — Medication 5 MILLIGRAM(S): at 13:00

## 2020-02-26 RX ADMIN — Medication 600 MILLIGRAM(S): at 05:45

## 2020-02-26 RX ADMIN — Medication 1 APPLICATION(S): at 05:45

## 2020-02-26 RX ADMIN — Medication 3 MILLILITER(S): at 12:55

## 2020-02-26 RX ADMIN — Medication 1 APPLICATION(S): at 17:36

## 2020-02-26 RX ADMIN — Medication 975 MILLIGRAM(S): at 00:03

## 2020-02-26 RX ADMIN — OXYCODONE HYDROCHLORIDE 5 MILLIGRAM(S): 5 TABLET ORAL at 23:13

## 2020-02-26 NOTE — PROGRESS NOTE ADULT - ASSESSMENT
A/P: 75 year old male w/ PMH of COPD and EtOH dependence with PSH/o appy, prostate surgery, & spine surgery  septic shock and high grade SBO s/p exploratory laparotomy, lysis of adhesions, decompression of bowel via enterotomy w/ primary repair, and Abthera VAC placement on 12/6; s/p take down of Abthera, washout and re-application of the Abthera vac on 12/8. Now s/p SBR and end ileostomy on 12/10.   TPN initally consulted to assist w/ management of pt's nutrition in pt with SGS/malabsorption and had high Ileostomy output.  Pt s/p Lt Chest Pigtail for effusion in IR with persistent high output on 1/10/2020. Pt had VATs & placement of Left PleurX catheter. Post op pt developed hemothorax and Respiratory Distress.  Pt s/p Lt chest Evacuation of 2L blood w/ placement of CT x2 on 1/15/2020.  Lt Pleural Effusion resolved and pt doing well after CT removed.  Pt also noted with Rt PNA vs Pleural Effusion that has resolved.  Pt is s/p Ex Lap, MIRYAM, & Closure of End Ileostomy on 2/4/2020.      Pt w/ improving severe Protein-Calorie Malnutrition- needing to be discharge on TPN               improving PreAlbumin, but not sufficient enough indicating on going Malabsorption               s/p Reversal of Ileostomy in pt w/ Short Gut Syndrome   If pt doesn't make sufficient progress he may require life long TPN or be a candidate for Gattex     Pt tolerating TPN at 1/2 strength appetite improving on Marinol- tolerating LRD               improving AST/ ALT, with nL LDH, Alk Phos, CPK- continue to monitor          1/2 Strength TPN: Amino Acids 60g, Dextrose 140g, Lipids 25g in 1200mL               with 3mL MTE & 10mL MVI     -SIADH - HypoNA & urine electrolytes confirmation          improving w/ decreased volume, continue to monitor          pt not edematous, making good urine  -HypoCa- improving w/increased Ca to 16mEq in TPN continue to monitor  -LowK - improving w/ 80mEq KCl in TPN, continue to monitor  -LowMg- improving with increased Mg to 16mEq  -HyperPhos- improving w/ NaPhos of 15mMol & will continue to monitor   -Protonix for recent h/o GIB & Gastritis noted on EGD- stable w/ increased Mg               no further bloody BM & H&H remains stable  -Leukocytosis- resolved  -Concern for ongoing malabsrption- pt w/ Semisolid & loose BMs & slow to improve                nutritional indices        Pt will require on going 1/2strength TPN when discharged home        Repeat Fat Soluble Vitamin studies -noted improving, but pt on TPN        TPN w/ MVI to compensate for low Vit E & A        Vit D levels improving w/Ergocalciferol         Vit K INR/ PT near normal suggestive that it is being absorbed   -Strict Intake and Output - continue to closely monitor  -Good glycemic control-  Fingersticks & ISS coverage -   -Weights three times a week  -Daily CMP, Mg, Ionized Ca, Phosphorus,        and Weekly Triglycerides and Pre-albumin  -Outpatient follow up with Dr Darryn Levin,             [phone]; [address],             [employer]; [address] block off of [address]  (Call to make appointment for 1st MONDAY after discharge from hospital - & say you're on TPN)  Continue as per Surgery, will follow with you, D/w primary team    Andreina Hubbard PA-C  TPN team, pager [phone]  D/w Dr Chacko  & MU Siegel A/P: 75 year old male w/ PMH of COPD and EtOH dependence with PSH/o appy, prostate surgery, & spine surgery  septic shock and high grade SBO s/p exploratory laparotomy, lysis of adhesions, decompression of bowel via enterotomy w/ primary repair, and Abthera VAC placement on 12/6; s/p take down of Abthera, washout and re-application of the Abthera vac on 12/8. Now s/p SBR and end ileostomy on 12/10.   TPN initally consulted to assist w/ management of pt's nutrition in pt with SGS/malabsorption and had high Ileostomy output.  Pt s/p Lt Chest Pigtail for effusion in IR with persistent high output on 1/10/2020. Pt had VATs & placement of Left PleurX catheter. Post op pt developed hemothorax and Respiratory Distress.  Pt s/p Lt chest Evacuation of 2L blood w/ placement of CT x2 on 1/15/2020.  Lt Pleural Effusion resolved and pt doing well after CT removed.  Pt also noted with Rt PNA vs Pleural Effusion that has resolved.  Pt is s/p Ex Lap, MIRYAM, & Closure of End Ileostomy on 2/4/2020.      Pt w/ improving severe Protein-Calorie Malnutrition- needing to be discharge on TPN               improving PreAlbumin, but not sufficient enough indicating on going Malabsorption               s/p Reversal of Ileostomy in pt w/ Short Gut Syndrome   If pt doesn't make sufficient progress he may require life long TPN or be a candidate for Gattex     Pt tolerating TPN at 1/2 strength appetite improving on Marinol- tolerating LRD               improving AST/ ALT, with nL LDH, Alk Phos, CPK- continue to monitor          1/2 Strength TPN: Amino Acids 60g, Dextrose 140g, Lipids 25g in 1200mL               with 3mL MTE & 10mL MVI  Pt is stable on TPN and not expected to require any changes while at rehab     -SIADH - improving w/ decreased volume, continue to monitor          pt not edematous, making good urine  -HypoCa- improving w/increased Ca to 16mEq in TPN continue to monitor  -LowK - improving w/ 80mEq KCl in TPN, continue to monitor  -LowMg- improving with increased Mg to 16mEq  -HyperPhos- improving w/ NaPhos of 15mMol & will continue to monitor   -Protonix for recent h/o GIB & Gastritis noted on EGD- stable w/ increased Mg               no further bloody BM & H&H remains stable  -Leukocytosis- resolved  -Concern for ongoing malabsrption- pt w/ Semisolid & loose BMs & slow to improve                nutritional indices        Pt will require on going 1/2strength TPN when discharged home        Repeat Fat Soluble Vitamin studies -noted improving, but pt on TPN        TPN w/ MVI to compensate for low Vit E & A        Vit D levels improving w/Ergocalciferol         Vit K INR/ PT near normal suggestive that it is being absorbed   -Strict Intake and Output - continue to closely monitor  -Good glycemic control-  Fingersticks & ISS coverage -   -Weights three times a week  -Daily CMP, Mg, Ionized Ca, Phosphorus,        and Weekly Triglycerides and Pre-albumin  -Outpatient follow up with Dr Darryn Levin,             [phone]; [address] [place] [address],             [employer] [place]; [address]  (Call to make appointment for 1st MONDAY after discharge from hospital - & say you're on TPN)  Continue as per Surgery, will follow with you, D/w primary team    Andreina Hubbard PA-C  TPN team, pager [phone]  D/w Dr Chacko  & MU Siegel

## 2020-02-26 NOTE — PROGRESS NOTE ADULT - SUBJECTIVE AND OBJECTIVE BOX
Subjective: Patient seen and examined. No new events except as noted.     REVIEW OF SYSTEMS:    CONSTITUTIONAL: No weakness, fevers or chills  EYES/ENT: No visual changes;  No vertigo or throat pain   NECK: No pain or stiffness  RESPIRATORY: No cough, wheezing, hemoptysis; No shortness of breath  CARDIOVASCULAR: No chest pain or palpitations  GASTROINTESTINAL: No abdominal or epigastric pain. No nausea, vomiting, or hematemesis; No diarrhea or constipation. No melena or hematochezia.  GENITOURINARY: No dysuria, frequency or hematuria  NEUROLOGICAL: No numbness or weakness  SKIN: No itching, burning, rashes, or lesions   All other review of systems is negative unless indicated above.    MEDICATIONS:  MEDICATIONS  (STANDING):  albuterol/ipratropium for Nebulization. 3 milliLiter(s) Nebulizer every 6 hours  budesonide 160 MICROgram(s)/formoterol 4.5 MICROgram(s) Inhaler 2 Puff(s) Inhalation two times a day  chlorhexidine 2% Cloths 1 Application(s) Topical <User Schedule>  chlorhexidine 4% Liquid 1 Application(s) Topical <User Schedule>  clobetasol 0.05% Ointment 1 Application(s) Topical two times a day  dronabinol 5 milliGRAM(s) Oral daily  enoxaparin Injectable 40 milliGRAM(s) SubCutaneous daily  fat emulsion (Fish Oil and Plant Based) 20% Infusion 10.4 mL/Hr (10.4 mL/Hr) IV Continuous <Continuous>  guaiFENesin  milliGRAM(s) Oral every 12 hours  metoprolol tartrate 12.5 milliGRAM(s) Oral every 12 hours  pantoprazole   Suspension 40 milliGRAM(s) Oral daily  Parenteral Nutrition - Adult 1 Each TPN Continuous <Continuous>  tamsulosin 0.4 milliGRAM(s) Oral at bedtime  triamcinolone 0.1% Ointment 1 Application(s) Topical two times a day      PHYSICAL EXAM:  T(C): 36.9 (02-26-20 @ 09:00), Max: 37.2 (02-25-20 @ 17:20)  HR: 89 (02-26-20 @ 09:00) (88 - 105)  BP: 106/61 (02-26-20 @ 09:00) (106/61 - 113/67)  RR: 18 (02-26-20 @ 09:00) (18 - 18)  SpO2: 96% (02-26-20 @ 09:00) (95% - 96%)  Wt(kg): --  I&O's Summary    25 Feb 2020 07:01  -  26 Feb 2020 07:00  --------------------------------------------------------  IN: 1050 mL / OUT: 875 mL / NET: 175 mL    26 Feb 2020 07:01  -  26 Feb 2020 10:36  --------------------------------------------------------  IN: 240 mL / OUT: 175 mL / NET: 65 mL          Appearance: Normal	  HEENT:   Normal oral mucosa, PERRL, EOMI	  Lymphatic: No lymphadenopathy , no edema  Cardiovascular: Normal S1 S2, No JVD, No murmurs , Peripheral pulses palpable 2+ bilaterally  Respiratory: Lungs clear to auscultation, normal effort 	  Gastrointestinal:  Soft, Non-tender, + BS	  Skin: No rashes, No ecchymoses, No cyanosis, warm to touch  Musculoskeletal: Normal range of motion, normal strength  Psychiatry:  Mood & affect appropriate  Ext: No edema      LABS:    CARDIAC MARKERS:                                8.2    9.51  )-----------( 350      ( 26 Feb 2020 07:14 )             26.6     02-26    134<L>  |  101  |  18  ----------------------------<  107<H>  4.4   |  23  |  0.44<L>    Ca    9.0      26 Feb 2020 07:14  Phos  3.9     02-26  Mg     1.8     02-26    TPro  7.1  /  Alb  2.9<L>  /  TBili  0.3  /  DBili  x   /  AST  16  /  ALT  50<H>  /  AlkPhos  110  02-26    proBNP:   Lipid Profile:   HgA1c:   TSH:             TELEMETRY: 	    ECG:  	  RADIOLOGY:   DIAGNOSTIC TESTING:  [ ] Echocardiogram:  [ ]  Catheterization:  [ ] Stress Test:    OTHER:

## 2020-02-26 NOTE — PROGRESS NOTE ADULT - ASSESSMENT
74 y/o M presenting with septic shock and high grade SBO s/p exploratory laparotomy, lysis of adhesions, decompression of bowel via enterotomy w/ primary repair, and Abthera VAC placement on 12/6; s/p take down of Abthera, washout and re-application of the Abthera vac on 12/8. 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 s/p L chest tube placement by IR 12/30 for pleural effusion now s/p VATS, with chest tube insertion for drainage of pleural effusion. RRT called 1/13 AM for hypoxia, patient transferred back to SICU.  Patient continued SOB, chest drain possible obstruction expanding. Patent taken back to OR emergently 1/15 for clot evac and VATS. Patient is now s/p Ileostomy reversal 2/4/20. Post-op c/b bloody BM and anemia requiring blood transfusion, now improved. Now tolerating regular diet.    PLAN:  - Dispo planning: to be discharged to [facility] with [facility] ([facility] F/U Case Management / Nutrition.  - C/w Marinol for appetite stimulation  - Continue regular diet with parenteral nutrition per Nutrition recommendations  - BID dressing changes (minimal gauze and tape)  - Monitor BM frequency and appearance  - Pain control as needed  - OOB with PT  - Appreciate Cv/pulm f/u   - Appreciate Dermatology eval/recs: Clobetasol 0.05% BID for 2 weeks. Outpatient follow up in 2 weeks on Tuesday mornings ([phone])    [facility]  [id number]

## 2020-02-26 NOTE — PROGRESS NOTE ADULT - SUBJECTIVE AND OBJECTIVE BOX
Surgery Daily Progress Note    SUBJECTIVE:   Patient doing well, no acute complaints.   Tolerating diet with no nausea/vomiting.   Continues to have well formed nonbloody bowel movements.     OBJECTIVE:  Physical exam:  General: alert and oriented, NAD  Resp: airway patent, respirations unlabored  CVS: regular rate and rhythm  Abdomen: non-distended, soft, non-tender, dressings with minimal purulent stainings, dressings changed in am rounds  Extremities: no edema  Skin: warm, dry, appropriate color    V/S:  Vital Signs Last 24 Hrs  T(C): 37.1 (26 Feb 2020 05:36), Max: 37.2 (25 Feb 2020 17:20)  T(F): 98.7 (26 Feb 2020 05:36), Max: 98.9 (25 Feb 2020 17:20)  HR: 88 (26 Feb 2020 05:36) (88 - 105)  BP: 110/68 (26 Feb 2020 05:36) (99/57 - 113/67)  BP(mean): --  RR: 18 (26 Feb 2020 05:36) (17 - 18)  SpO2: 95% (26 Feb 2020 05:36) (93% - 95%)    --------------------------------------------------------------------------------------------------  I/Os:    25 Feb 2020 07:01  -  26 Feb 2020 07:00  --------------------------------------------------------  IN:    Oral Fluid: 450 mL    TPN (Total Parenteral Nutrition): 600 mL  Total IN: 1050 mL    OUT:    Voided: 875 mL  Total OUT: 875 mL    Total NET: 175 mL        --------------------------------------------------------------------------------------------------  LABS:    26 Feb 2020 07:14    134    |  101    |  18     ----------------------------<  107    4.4     |  23     |  0.44     Ca    9.0        26 Feb 2020 07:14  Phos  3.9       26 Feb 2020 07:14  Mg     1.8       26 Feb 2020 07:14    TPro  7.1    /  Alb  2.9    /  TBili  0.3    /  DBili  x      /  AST  16     /  ALT  50     /  AlkPhos  110    26 Feb 2020 07:14      CAPILLARY BLOOD GLUCOSE            LIVER FUNCTIONS - ( 26 Feb 2020 07:14 )  Alb: 2.9 g/dL / Pro: 7.1 g/dL / ALK PHOS: 110 U/L / ALT: 50 U/L / AST: 16 U/L / GGT: x               --------------------------------------------------------------------------------------------------  MEDICATIONS  (STANDING):  albuterol/ipratropium for Nebulization. 3 milliLiter(s) Nebulizer every 6 hours  budesonide 160 MICROgram(s)/formoterol 4.5 MICROgram(s) Inhaler 2 Puff(s) Inhalation two times a day  chlorhexidine 2% Cloths 1 Application(s) Topical <User Schedule>  chlorhexidine 4% Liquid 1 Application(s) Topical <User Schedule>  clobetasol 0.05% Ointment 1 Application(s) Topical two times a day  dronabinol 5 milliGRAM(s) Oral daily  enoxaparin Injectable 40 milliGRAM(s) SubCutaneous daily  fat emulsion (Fish Oil and Plant Based) 20% Infusion 10.4 mL/Hr (10.4 mL/Hr) IV Continuous <Continuous>  guaiFENesin  milliGRAM(s) Oral every 12 hours  metoprolol tartrate 12.5 milliGRAM(s) Oral every 12 hours  pantoprazole   Suspension 40 milliGRAM(s) Oral daily  Parenteral Nutrition - Adult 1 Each TPN Continuous <Continuous>  tamsulosin 0.4 milliGRAM(s) Oral at bedtime  triamcinolone 0.1% Ointment 1 Application(s) Topical two times a day    MEDICATIONS  (PRN):  acetaminophen   Tablet .. 975 milliGRAM(s) Oral every 6 hours PRN Mild Pain (1 - 3)  oxyCODONE    IR 5 milliGRAM(s) Oral every 4 hours PRN Moderate Pain (4 - 6)

## 2020-02-26 NOTE — PROGRESS NOTE ADULT - I WAS PHYSICALLY PRESENT FOR THE KEY PORTIONS OF THE EVALUATION AND MANAGEMENT (E/M) SERVICE PROVIDED.  I AGREE WITH THE ABOVE HISTORY, PHYSICAL, AND PLAN WHICH I HAVE REVIEWED AND EDITED WHERE APPROPRIATE

## 2020-02-26 NOTE — PROGRESS NOTE ADULT - SUBJECTIVE AND OBJECTIVE BOX
[facility] NUTRITION SUPPORT / TPN -- FOLLOW UP NOTE  --------------------------------------------------------------------------------    24 hour events/subjective:  No n/v(+)flatus  (+)diarrhea x2 unchanged  Appetite so-so today- ate 1/2 Finnish toast and sausage  DC planning w/ TPN-       tolerating cycled 12hr TPN w/ LRD and Ensure supplements       Pt having issues w/ dentures, so he's choosing calorie dense liquids & soft foods       doesn't want mechanical soft or Full liquid diet  Pt on Protonix suspension QD  no abdominal Pain  OOB ad rachel-  walks around the unit every afternoon    No HA/ dizziness, syncope, lightheadedness, loss of balance  No cough/ cp/ palp/dyspnea/ sob  No f/c/s        Diet:  Diet, Low Fiber:   Supplement Feeding Modality:  Oral  Ensure Clear Cans or Servings Per Day:  1       Frequency:  Three Times a day  Ensure Enlive Cans or Servings Per Day:  1       Frequency:  Three Times a day (02-19-20 @ 10:36)      Appetite: [  ]Poor [ x ]Adequate [  ]Good  Caloric intake:  [  x ]  Adequate   [   ] Inadequate    ROS: General/ GI see HPI  all other systems negative      ALLERGIES & MEDICATIONS  --------------------------------------------------------------------------------  ALLERGIES  IV Contrast (Hives)    STANDING INPATIENT MEDICATIONS    albuterol/ipratropium for Nebulization. 3 milliLiter(s) Nebulizer every 6 hours  budesonide 160 MICROgram(s)/formoterol 4.5 MICROgram(s) Inhaler 2 Puff(s) Inhalation two times a day  chlorhexidine 2% Cloths 1 Application(s) Topical <User Schedule>  chlorhexidine 4% Liquid 1 Application(s) Topical <User Schedule>  clobetasol 0.05% Ointment 1 Application(s) Topical two times a day  dronabinol 5 milliGRAM(s) Oral daily  enoxaparin Injectable 40 milliGRAM(s) SubCutaneous daily  fat emulsion (Fish Oil and Plant Based) 20% Infusion 10.4 mL/Hr IV Continuous <Continuous>  guaiFENesin  milliGRAM(s) Oral every 12 hours  metoprolol tartrate 12.5 milliGRAM(s) Oral every 12 hours  pantoprazole   Suspension 40 milliGRAM(s) Oral daily  Parenteral Nutrition - Adult 1 Each TPN Continuous <Continuous>  tamsulosin 0.4 milliGRAM(s) Oral at bedtime  triamcinolone 0.1% Ointment 1 Application(s) Topical two times a day      PRN INPATIENT MEDICATION  acetaminophen   Tablet .. 975 milliGRAM(s) Oral every 6 hours PRN  oxyCODONE    IR 5 milliGRAM(s) Oral every 4 hours PRN        VITALS/PHYSICAL EXAM  --------------------------------------------------------------------------------  T(C): 37.1 (02-26-20 @ 05:36), Max: 37.2 (02-25-20 @ 17:20)  HR: 88 (02-26-20 @ 05:36) (88 - 105)  BP: 110/68 (02-26-20 @ 05:36) (106/64 - 113/67)  RR: 18 (02-26-20 @ 05:36) (18 - 18)  SpO2: 95% (02-26-20 @ 05:36) (95% - 95%)  Wt(kg): --        02-25-20 @ 07:01  -  02-26-20 @ 07:00  --------------------------------------------------------  IN: 1050 mL / OUT: 875 mL / NET: 175 mL    PHYSICAL EXAM:  --------------------------------------------------------------------------------  	Gen: guarded but stable, A&Ox3  	HEENT: NC/AT, PERRL, supple neck, trachea midline, mucosa moist              GI: (+) BS, nondistended, nontender                    abdominal dressing c/d/i              MSK: FROM x4, no contractures nor deformities              Vascular: Equally Warm,  no edema, no clubbing, cyanosis,                      RUE PICC w/o sx infection  	Neuro: No focal deficits, grossly intact sensation, & strength   	Psych: Normal affect and mood              skin: moist, good turgor, erythematous pinpoint rash chest/ back        LABS/ CULTURES/ RADIOLOGY:              8.2    9.51  >-----------<  350      [02-26-20 @ 07:14]              26.6     134  |  101  |  18  ----------------------------<  107      [02-26-20 @ 07:14]  4.4   |  23  |  0.44        Ca     9.0     [02-26-20 @ 07:14]      iCa    1.25     [02-26 @ 07:11]      Mg     1.8     [02-26-20 @ 07:14]      Phos  3.9     [02-26-20 @ 07:14]    TPro  7.1  /  Alb  2.9  /  TBili  0.3  /  DBili  x   /  AST  16  /  ALT  50  /  AlkPhos  110  [02-26-20 @ 07:14]    Prealbumin, Serum: 21 mg/dL (02-25-20 @ 09:24)  Prealbumin, Serum: 19 mg/dL (02-20-20 @ 09:06)  Prealbumin, Serum: 16 mg/dL (02-15-20 @ 12:30)  Prealbumin, Serum: 17 mg/dL (02-10-20 @ 09:15)  Prealbumin, Serum: 21 mg/dL (02-03-20 @ 23:50)    Triglycerides, Serum: 36 mg/dL (02.25.20 @ 06:04)  Triglycerides, Serum: 38 mg/dL (02.20.20 @ 05:50)  Triglycerides, Serum: 59 mg/dL (02.10.20 @ 01:03)   Triglycerides, Serum: 51 mg/dL (01.31.20 @ 04:10) [facility] NUTRITION SUPPORT / TPN -- FOLLOW UP NOTE  --------------------------------------------------------------------------------    24 hour events/subjective:  No n/v(+)flatus  (+)diarrhea x2 unchanged  Appetite so-so today- ate 1/2 [place] toast and sausage  DC planning w/ TPN-       tolerating cycled 12hr TPN w/ LRD and Ensure supplements       Pt having issues w/ dentures, so he's choosing calorie dense liquids & soft foods       doesn't want mechanical soft or Full liquid diet  Pt on Protonix suspension QD  no abdominal Pain  OOB ad rachel-  walks around the unit every afternoon    No HA/ dizziness, syncope, lightheadedness, loss of balance  No cough/ cp/ palp/dyspnea/ sob  No f/c/s        Diet:  Diet, Low Fiber:   Supplement Feeding Modality:  Oral  Ensure Clear Cans or Servings Per Day:  1       Frequency:  Three Times a day  Ensure Enlive Cans or Servings Per Day:  1       Frequency:  Three Times a day (02-19-20 @ 10:36)      Appetite: [  ]Poor [ x ]Adequate [  ]Good  Caloric intake:  [  x ]  Adequate   [   ] Inadequate    ROS: General/ GI see HPI  all other systems negative      ALLERGIES & MEDICATIONS  --------------------------------------------------------------------------------  ALLERGIES  IV Contrast (Hives)    STANDING INPATIENT MEDICATIONS    albuterol/ipratropium for Nebulization. 3 milliLiter(s) Nebulizer every 6 hours  budesonide 160 MICROgram(s)/formoterol 4.5 MICROgram(s) Inhaler 2 Puff(s) Inhalation two times a day  chlorhexidine 2% Cloths 1 Application(s) Topical <User Schedule>  chlorhexidine 4% Liquid 1 Application(s) Topical <User Schedule>  clobetasol 0.05% Ointment 1 Application(s) Topical two times a day  dronabinol 5 milliGRAM(s) Oral daily  enoxaparin Injectable 40 milliGRAM(s) SubCutaneous daily  fat emulsion (Fish Oil and Plant Based) 20% Infusion 10.4 mL/Hr IV Continuous <Continuous>  guaiFENesin  milliGRAM(s) Oral every 12 hours  metoprolol tartrate 12.5 milliGRAM(s) Oral every 12 hours  pantoprazole   Suspension 40 milliGRAM(s) Oral daily  Parenteral Nutrition - Adult 1 Each TPN Continuous <Continuous>  tamsulosin 0.4 milliGRAM(s) Oral at bedtime  triamcinolone 0.1% Ointment 1 Application(s) Topical two times a day      PRN INPATIENT MEDICATION  acetaminophen   Tablet .. 975 milliGRAM(s) Oral every 6 hours PRN  oxyCODONE    IR 5 milliGRAM(s) Oral every 4 hours PRN        VITALS/PHYSICAL EXAM  --------------------------------------------------------------------------------  T(C): 37.1 (02-26-20 @ 05:36), Max: 37.2 (02-25-20 @ 17:20)  HR: 88 (02-26-20 @ 05:36) (88 - 105)  BP: 110/68 (02-26-20 @ 05:36) (106/64 - 113/67)  RR: 18 (02-26-20 @ 05:36) (18 - 18)  SpO2: 95% (02-26-20 @ 05:36) (95% - 95%)  Wt(kg): --        02-25-20 @ 07:01  -  02-26-20 @ 07:00  --------------------------------------------------------  IN: 1050 mL / OUT: 875 mL / NET: 175 mL    PHYSICAL EXAM:  --------------------------------------------------------------------------------  	Gen: guarded but stable, A&Ox3  	HEENT: NC/AT, PERRL, supple neck, trachea midline, mucosa moist              GI: (+) BS, nondistended, nontender                    abdominal dressing c/d/i              MSK: FROM x4, no contractures nor deformities              Vascular: Equally Warm,  no edema, no clubbing, cyanosis,                      RUE PICC w/o sx infection  	Neuro: No focal deficits, grossly intact sensation, & strength   	Psych: Normal affect and mood              skin: moist, good turgor, erythematous pinpoint rash chest/ back        LABS/ CULTURES/ RADIOLOGY:              8.2    9.51  >-----------<  350      [02-26-20 @ 07:14]              26.6     134  |  101  |  18  ----------------------------<  107      [02-26-20 @ 07:14]  4.4   |  23  |  0.44        Ca     9.0     [02-26-20 @ 07:14]      iCa    1.25     [02-26 @ 07:11]      Mg     1.8     [02-26-20 @ 07:14]      Phos  3.9     [02-26-20 @ 07:14]    TPro  7.1  /  Alb  2.9  /  TBili  0.3  /  DBili  x   /  AST  16  /  ALT  50  /  AlkPhos  110  [02-26-20 @ 07:14]    Prealbumin, Serum: 21 mg/dL (02-25-20 @ 09:24)  Prealbumin, Serum: 19 mg/dL (02-20-20 @ 09:06)  Prealbumin, Serum: 16 mg/dL (02-15-20 @ 12:30)  Prealbumin, Serum: 17 mg/dL (02-10-20 @ 09:15)  Prealbumin, Serum: 21 mg/dL (02-03-20 @ 23:50)    Triglycerides, Serum: 36 mg/dL (02.25.20 @ 06:04)  Triglycerides, Serum: 38 mg/dL (02.20.20 @ 05:50)  Triglycerides, Serum: 59 mg/dL (02.10.20 @ 01:03)   Triglycerides, Serum: 51 mg/dL (01.31.20 @ 04:10)    Alpha Tocopherol, Serum  (02.19.20 @ 11:31)     Alpha Tocopherol: 9.5 mg/L    beta-gamma-Tocopherol Measurement: 0.3 mg/L    Alpha Tocopherol, Serum (01.18.20 @ 04:36)     Alpha Tocopherol: 9.2 mg/L    beta-gamma-Tocopherol Measurement: 0.2mg/L    This test was developed and its performance characteristics  determined by [facility]. It has not been cleared or  approved by the Food and Drug Administration.  Reference intervals for alpha and gamma-tocopherol  determined from National Health and Nutrition Examination  Survey, [id number]. Individuals with alpha-tocopherol levels  less than 5.0 mg/L are considered vitamin E deficient.  Performed At: [address] [id number]  Paresh Roche MD Ph:[phone] mg/L  This test was developed and its performance characteristics  determined by [facility]. It has not been cleared or  approved by the Food and Drug Administration.    Vitamin A, Serum: 33.6 ug/dL (02.19.20 @ 14:17)  Vitamin A, Serum: 19.0 ug/dL (01.19.20 @ 19:29)    Reference intervals for vitamin A determined from [facility]  internal studies. Individuals with vitamin A less than 20  ug/dL are considered vitamin A deficient and those with  serum concentrations less than 10 ug/dL are considered  severely deficient.  This test was developed and its performance characteristics  determined by [facility]. It has not been cleared or  approved by the Food and Drug Administration.  Performed At: [address] [id number]  Paresh Roche MD Ph:[phone]

## 2020-02-27 ENCOUNTER — TRANSCRIPTION ENCOUNTER (OUTPATIENT)
Age: 75
End: 2020-02-27

## 2020-02-27 VITALS
SYSTOLIC BLOOD PRESSURE: 125 MMHG | OXYGEN SATURATION: 98 % | HEART RATE: 98 BPM | DIASTOLIC BLOOD PRESSURE: 80 MMHG | TEMPERATURE: 98 F | RESPIRATION RATE: 18 BRPM

## 2020-02-27 LAB
ALBUMIN SERPL ELPH-MCNC: 3 G/DL — LOW (ref 3.3–5)
ALP SERPL-CCNC: 103 U/L — SIGNIFICANT CHANGE UP (ref 40–120)
ALT FLD-CCNC: 44 U/L — SIGNIFICANT CHANGE UP (ref 10–45)
ANION GAP SERPL CALC-SCNC: 8 MMOL/L — SIGNIFICANT CHANGE UP (ref 5–17)
AST SERPL-CCNC: 12 U/L — SIGNIFICANT CHANGE UP (ref 10–40)
BASOPHILS # BLD AUTO: 0.02 K/UL — SIGNIFICANT CHANGE UP (ref 0–0.2)
BASOPHILS NFR BLD AUTO: 0.2 % — SIGNIFICANT CHANGE UP (ref 0–2)
BILIRUB SERPL-MCNC: 0.3 MG/DL — SIGNIFICANT CHANGE UP (ref 0.2–1.2)
BUN SERPL-MCNC: 21 MG/DL — SIGNIFICANT CHANGE UP (ref 7–23)
CA-I BLD-SCNC: 1.3 MMOL/L — SIGNIFICANT CHANGE UP (ref 1.12–1.3)
CALCIUM SERPL-MCNC: 8.9 MG/DL — SIGNIFICANT CHANGE UP (ref 8.4–10.5)
CHLORIDE SERPL-SCNC: 101 MMOL/L — SIGNIFICANT CHANGE UP (ref 96–108)
CO2 SERPL-SCNC: 23 MMOL/L — SIGNIFICANT CHANGE UP (ref 22–31)
CREAT SERPL-MCNC: 0.46 MG/DL — LOW (ref 0.5–1.3)
EOSINOPHIL # BLD AUTO: 0.34 K/UL — SIGNIFICANT CHANGE UP (ref 0–0.5)
EOSINOPHIL NFR BLD AUTO: 4.1 % — SIGNIFICANT CHANGE UP (ref 0–6)
GLUCOSE SERPL-MCNC: 122 MG/DL — HIGH (ref 70–99)
HCT VFR BLD CALC: 26.6 % — LOW (ref 39–50)
HGB BLD-MCNC: 8.2 G/DL — LOW (ref 13–17)
IMM GRANULOCYTES NFR BLD AUTO: 0.8 % — SIGNIFICANT CHANGE UP (ref 0–1.5)
LYMPHOCYTES # BLD AUTO: 1.59 K/UL — SIGNIFICANT CHANGE UP (ref 1–3.3)
LYMPHOCYTES # BLD AUTO: 19 % — SIGNIFICANT CHANGE UP (ref 13–44)
MAGNESIUM SERPL-MCNC: 1.9 MG/DL — SIGNIFICANT CHANGE UP (ref 1.6–2.6)
MCHC RBC-ENTMCNC: 28.4 PG — SIGNIFICANT CHANGE UP (ref 27–34)
MCHC RBC-ENTMCNC: 30.8 GM/DL — LOW (ref 32–36)
MCV RBC AUTO: 92 FL — SIGNIFICANT CHANGE UP (ref 80–100)
MONOCYTES # BLD AUTO: 0.77 K/UL — SIGNIFICANT CHANGE UP (ref 0–0.9)
MONOCYTES NFR BLD AUTO: 9.2 % — SIGNIFICANT CHANGE UP (ref 2–14)
NEUTROPHILS # BLD AUTO: 5.56 K/UL — SIGNIFICANT CHANGE UP (ref 1.8–7.4)
NEUTROPHILS NFR BLD AUTO: 66.7 % — SIGNIFICANT CHANGE UP (ref 43–77)
NRBC # BLD: 0 /100 WBCS — SIGNIFICANT CHANGE UP (ref 0–0)
PHOSPHATE SERPL-MCNC: 4.3 MG/DL — SIGNIFICANT CHANGE UP (ref 2.5–4.5)
PLATELET # BLD AUTO: 312 K/UL — SIGNIFICANT CHANGE UP (ref 150–400)
POTASSIUM SERPL-MCNC: 5 MMOL/L — SIGNIFICANT CHANGE UP (ref 3.5–5.3)
POTASSIUM SERPL-SCNC: 5 MMOL/L — SIGNIFICANT CHANGE UP (ref 3.5–5.3)
PREALB SERPL-MCNC: 21 MG/DL — SIGNIFICANT CHANGE UP (ref 20–40)
PROT SERPL-MCNC: 7 G/DL — SIGNIFICANT CHANGE UP (ref 6–8.3)
RBC # BLD: 2.89 M/UL — LOW (ref 4.2–5.8)
RBC # FLD: 13.7 % — SIGNIFICANT CHANGE UP (ref 10.3–14.5)
SODIUM SERPL-SCNC: 132 MMOL/L — LOW (ref 135–145)
WBC # BLD: 8.35 K/UL — SIGNIFICANT CHANGE UP (ref 3.8–10.5)
WBC # FLD AUTO: 8.35 K/UL — SIGNIFICANT CHANGE UP (ref 3.8–10.5)

## 2020-02-27 PROCEDURE — C1889: CPT

## 2020-02-27 PROCEDURE — C1769: CPT

## 2020-02-27 PROCEDURE — 82607 VITAMIN B-12: CPT

## 2020-02-27 PROCEDURE — 89051 BODY FLUID CELL COUNT: CPT

## 2020-02-27 PROCEDURE — 93005 ELECTROCARDIOGRAM TRACING: CPT

## 2020-02-27 PROCEDURE — 82803 BLOOD GASES ANY COMBINATION: CPT

## 2020-02-27 PROCEDURE — 80202 ASSAY OF VANCOMYCIN: CPT

## 2020-02-27 PROCEDURE — 83970 ASSAY OF PARATHORMONE: CPT

## 2020-02-27 PROCEDURE — 87075 CULTR BACTERIA EXCEPT BLOOD: CPT

## 2020-02-27 PROCEDURE — 94002 VENT MGMT INPAT INIT DAY: CPT

## 2020-02-27 PROCEDURE — 80053 COMPREHEN METABOLIC PANEL: CPT

## 2020-02-27 PROCEDURE — 80061 LIPID PANEL: CPT

## 2020-02-27 PROCEDURE — 87070 CULTURE OTHR SPECIMN AEROBIC: CPT

## 2020-02-27 PROCEDURE — 36584 COMPL RPLCMT PICC RS&I: CPT

## 2020-02-27 PROCEDURE — 74176 CT ABD & PELVIS W/O CONTRAST: CPT

## 2020-02-27 PROCEDURE — 94799 UNLISTED PULMONARY SVC/PX: CPT

## 2020-02-27 PROCEDURE — 84443 ASSAY THYROID STIM HORMONE: CPT

## 2020-02-27 PROCEDURE — 32557 INSERT CATH PLEURA W/ IMAGE: CPT

## 2020-02-27 PROCEDURE — 84302 ASSAY OF SWEAT SODIUM: CPT

## 2020-02-27 PROCEDURE — 93308 TTE F-UP OR LMTD: CPT

## 2020-02-27 PROCEDURE — 82330 ASSAY OF CALCIUM: CPT

## 2020-02-27 PROCEDURE — 84466 ASSAY OF TRANSFERRIN: CPT

## 2020-02-27 PROCEDURE — 82024 ASSAY OF ACTH: CPT

## 2020-02-27 PROCEDURE — P9045: CPT

## 2020-02-27 PROCEDURE — 94770: CPT

## 2020-02-27 PROCEDURE — 36569 INSJ PICC 5 YR+ W/O IMAGING: CPT

## 2020-02-27 PROCEDURE — 88305 TISSUE EXAM BY PATHOLOGIST: CPT

## 2020-02-27 PROCEDURE — 85014 HEMATOCRIT: CPT

## 2020-02-27 PROCEDURE — 82947 ASSAY GLUCOSE BLOOD QUANT: CPT

## 2020-02-27 PROCEDURE — 88112 CYTOPATH CELL ENHANCE TECH: CPT

## 2020-02-27 PROCEDURE — 71260 CT THORAX DX C+: CPT

## 2020-02-27 PROCEDURE — 82553 CREATINE MB FRACTION: CPT

## 2020-02-27 PROCEDURE — 96375 TX/PRO/DX INJ NEW DRUG ADDON: CPT

## 2020-02-27 PROCEDURE — 93971 EXTREMITY STUDY: CPT

## 2020-02-27 PROCEDURE — 94640 AIRWAY INHALATION TREATMENT: CPT

## 2020-02-27 PROCEDURE — 82962 GLUCOSE BLOOD TEST: CPT

## 2020-02-27 PROCEDURE — 82945 GLUCOSE OTHER FLUID: CPT

## 2020-02-27 PROCEDURE — 87102 FUNGUS ISOLATION CULTURE: CPT

## 2020-02-27 PROCEDURE — P9016: CPT

## 2020-02-27 PROCEDURE — 97162 PT EVAL MOD COMPLEX 30 MIN: CPT

## 2020-02-27 PROCEDURE — 84295 ASSAY OF SERUM SODIUM: CPT

## 2020-02-27 PROCEDURE — 82570 ASSAY OF URINE CREATININE: CPT

## 2020-02-27 PROCEDURE — 83605 ASSAY OF LACTIC ACID: CPT

## 2020-02-27 PROCEDURE — 87040 BLOOD CULTURE FOR BACTERIA: CPT

## 2020-02-27 PROCEDURE — 96374 THER/PROPH/DIAG INJ IV PUSH: CPT

## 2020-02-27 PROCEDURE — C1894: CPT

## 2020-02-27 PROCEDURE — 87086 URINE CULTURE/COLONY COUNT: CPT

## 2020-02-27 PROCEDURE — 87389 HIV-1 AG W/HIV-1&-2 AB AG IA: CPT

## 2020-02-27 PROCEDURE — 84999 UNLISTED CHEMISTRY PROCEDURE: CPT

## 2020-02-27 PROCEDURE — C1729: CPT

## 2020-02-27 PROCEDURE — 93306 TTE W/DOPPLER COMPLETE: CPT

## 2020-02-27 PROCEDURE — 84478 ASSAY OF TRIGLYCERIDES: CPT

## 2020-02-27 PROCEDURE — 92610 EVALUATE SWALLOWING FUNCTION: CPT

## 2020-02-27 PROCEDURE — 82550 ASSAY OF CK (CPK): CPT

## 2020-02-27 PROCEDURE — 73610 X-RAY EXAM OF ANKLE: CPT

## 2020-02-27 PROCEDURE — 82533 TOTAL CORTISOL: CPT

## 2020-02-27 PROCEDURE — 86923 COMPATIBILITY TEST ELECTRIC: CPT

## 2020-02-27 PROCEDURE — 88342 IMHCHEM/IMCYTCHM 1ST ANTB: CPT

## 2020-02-27 PROCEDURE — 82705 FATS/LIPIDS FECES QUAL: CPT

## 2020-02-27 PROCEDURE — 88307 TISSUE EXAM BY PATHOLOGIST: CPT

## 2020-02-27 PROCEDURE — 82728 ASSAY OF FERRITIN: CPT

## 2020-02-27 PROCEDURE — C1751: CPT

## 2020-02-27 PROCEDURE — C8929: CPT

## 2020-02-27 PROCEDURE — 97535 SELF CARE MNGMENT TRAINING: CPT

## 2020-02-27 PROCEDURE — 97116 GAIT TRAINING THERAPY: CPT

## 2020-02-27 PROCEDURE — 99292 CRITICAL CARE ADDL 30 MIN: CPT | Mod: 25

## 2020-02-27 PROCEDURE — 87449 NOS EACH ORGANISM AG IA: CPT

## 2020-02-27 PROCEDURE — 85730 THROMBOPLASTIN TIME PARTIAL: CPT

## 2020-02-27 PROCEDURE — 87103 BLOOD FUNGUS CULTURE: CPT

## 2020-02-27 PROCEDURE — 86900 BLOOD TYPING SEROLOGIC ABO: CPT

## 2020-02-27 PROCEDURE — 74177 CT ABD & PELVIS W/CONTRAST: CPT

## 2020-02-27 PROCEDURE — 83540 ASSAY OF IRON: CPT

## 2020-02-27 PROCEDURE — 92526 ORAL FUNCTION THERAPY: CPT

## 2020-02-27 PROCEDURE — 81001 URINALYSIS AUTO W/SCOPE: CPT

## 2020-02-27 PROCEDURE — 83935 ASSAY OF URINE OSMOLALITY: CPT

## 2020-02-27 PROCEDURE — 86140 C-REACTIVE PROTEIN: CPT

## 2020-02-27 PROCEDURE — 82436 ASSAY OF URINE CHLORIDE: CPT

## 2020-02-27 PROCEDURE — 96376 TX/PRO/DX INJ SAME DRUG ADON: CPT

## 2020-02-27 PROCEDURE — 86850 RBC ANTIBODY SCREEN: CPT

## 2020-02-27 PROCEDURE — 85027 COMPLETE CBC AUTOMATED: CPT

## 2020-02-27 PROCEDURE — 88304 TISSUE EXAM BY PATHOLOGIST: CPT

## 2020-02-27 PROCEDURE — 84439 ASSAY OF FREE THYROXINE: CPT

## 2020-02-27 PROCEDURE — 94660 CPAP INITIATION&MGMT: CPT

## 2020-02-27 PROCEDURE — 74018 RADEX ABDOMEN 1 VIEW: CPT

## 2020-02-27 PROCEDURE — 84100 ASSAY OF PHOSPHORUS: CPT

## 2020-02-27 PROCEDURE — 84132 ASSAY OF SERUM POTASSIUM: CPT

## 2020-02-27 PROCEDURE — 83986 ASSAY PH BODY FLUID NOS: CPT

## 2020-02-27 PROCEDURE — 88312 SPECIAL STAINS GROUP 1: CPT

## 2020-02-27 PROCEDURE — 83615 LACTATE (LD) (LDH) ENZYME: CPT

## 2020-02-27 PROCEDURE — 80076 HEPATIC FUNCTION PANEL: CPT

## 2020-02-27 PROCEDURE — 36430 TRANSFUSION BLD/BLD COMPNT: CPT

## 2020-02-27 PROCEDURE — 83550 IRON BINDING TEST: CPT

## 2020-02-27 PROCEDURE — 80048 BASIC METABOLIC PNL TOTAL CA: CPT

## 2020-02-27 PROCEDURE — P9059: CPT

## 2020-02-27 PROCEDURE — 87186 SC STD MICRODIL/AGAR DIL: CPT

## 2020-02-27 PROCEDURE — 85610 PROTHROMBIN TIME: CPT

## 2020-02-27 PROCEDURE — 82438 ASSAY OTHER FLUID CHLORIDES: CPT

## 2020-02-27 PROCEDURE — 99233 SBSQ HOSP IP/OBS HIGH 50: CPT

## 2020-02-27 PROCEDURE — 97110 THERAPEUTIC EXERCISES: CPT

## 2020-02-27 PROCEDURE — 87633 RESP VIRUS 12-25 TARGETS: CPT

## 2020-02-27 PROCEDURE — 87581 M.PNEUMON DNA AMP PROBE: CPT

## 2020-02-27 PROCEDURE — 84300 ASSAY OF URINE SODIUM: CPT

## 2020-02-27 PROCEDURE — 87205 SMEAR GRAM STAIN: CPT

## 2020-02-27 PROCEDURE — 71045 X-RAY EXAM CHEST 1 VIEW: CPT

## 2020-02-27 PROCEDURE — 84134 ASSAY OF PREALBUMIN: CPT

## 2020-02-27 PROCEDURE — 84484 ASSAY OF TROPONIN QUANT: CPT

## 2020-02-27 PROCEDURE — 82042 OTHER SOURCE ALBUMIN QUAN EA: CPT

## 2020-02-27 PROCEDURE — 82272 OCCULT BLD FECES 1-3 TESTS: CPT

## 2020-02-27 PROCEDURE — 83930 ASSAY OF BLOOD OSMOLALITY: CPT

## 2020-02-27 PROCEDURE — 82310 ASSAY OF CALCIUM: CPT

## 2020-02-27 PROCEDURE — 84133 ASSAY OF URINE POTASSIUM: CPT

## 2020-02-27 PROCEDURE — 82710 FATS/LIPIDS FECES QUANT: CPT

## 2020-02-27 PROCEDURE — 82746 ASSAY OF FOLIC ACID SERUM: CPT

## 2020-02-27 PROCEDURE — 74230 X-RAY XM SWLNG FUNCJ C+: CPT

## 2020-02-27 PROCEDURE — 87486 CHLMYD PNEUM DNA AMP PROBE: CPT

## 2020-02-27 PROCEDURE — 87798 DETECT AGENT NOS DNA AMP: CPT

## 2020-02-27 PROCEDURE — 86901 BLOOD TYPING SEROLOGIC RH(D): CPT

## 2020-02-27 PROCEDURE — 84590 ASSAY OF VITAMIN A: CPT

## 2020-02-27 PROCEDURE — 97166 OT EVAL MOD COMPLEX 45 MIN: CPT

## 2020-02-27 PROCEDURE — 93321 DOPPLER ECHO F-UP/LMTD STD: CPT

## 2020-02-27 PROCEDURE — 82565 ASSAY OF CREATININE: CPT

## 2020-02-27 PROCEDURE — 97112 NEUROMUSCULAR REEDUCATION: CPT

## 2020-02-27 PROCEDURE — 84157 ASSAY OF PROTEIN OTHER: CPT

## 2020-02-27 PROCEDURE — 97530 THERAPEUTIC ACTIVITIES: CPT

## 2020-02-27 PROCEDURE — 92611 MOTION FLUOROSCOPY/SWALLOW: CPT

## 2020-02-27 PROCEDURE — 97760 ORTHOTIC MGMT&TRAING 1ST ENC: CPT

## 2020-02-27 PROCEDURE — 82306 VITAMIN D 25 HYDROXY: CPT

## 2020-02-27 PROCEDURE — 84145 PROCALCITONIN (PCT): CPT

## 2020-02-27 PROCEDURE — 99291 CRITICAL CARE FIRST HOUR: CPT | Mod: 25

## 2020-02-27 PROCEDURE — 71250 CT THORAX DX C-: CPT

## 2020-02-27 PROCEDURE — 93970 EXTREMITY STUDY: CPT

## 2020-02-27 PROCEDURE — 83036 HEMOGLOBIN GLYCOSYLATED A1C: CPT

## 2020-02-27 PROCEDURE — 84540 ASSAY OF URINE/UREA-N: CPT

## 2020-02-27 PROCEDURE — 83735 ASSAY OF MAGNESIUM: CPT

## 2020-02-27 PROCEDURE — 84446 ASSAY OF VITAMIN E: CPT

## 2020-02-27 PROCEDURE — 82435 ASSAY OF BLOOD CHLORIDE: CPT

## 2020-02-27 PROCEDURE — 94003 VENT MGMT INPAT SUBQ DAY: CPT

## 2020-02-27 PROCEDURE — 84588 ASSAY OF VASOPRESSIN: CPT

## 2020-02-27 PROCEDURE — 82652 VIT D 1 25-DIHYDROXY: CPT

## 2020-02-27 RX ORDER — ELECTROLYTE SOLUTION,INJ
1 VIAL (ML) INTRAVENOUS
Refills: 0 | Status: DISCONTINUED | OUTPATIENT
Start: 2020-02-27 | End: 2020-02-27

## 2020-02-27 RX ORDER — I.V. FAT EMULSION 20 G/100ML
0 EMULSION INTRAVENOUS
Qty: 0 | Refills: 0 | DISCHARGE
Start: 2020-02-27

## 2020-02-27 RX ORDER — METOPROLOL TARTRATE 50 MG
12.5 TABLET ORAL
Qty: 0 | Refills: 0 | DISCHARGE
Start: 2020-02-27

## 2020-02-27 RX ORDER — PANTOPRAZOLE SODIUM 20 MG/1
40 TABLET, DELAYED RELEASE ORAL
Qty: 0 | Refills: 0 | DISCHARGE
Start: 2020-02-27

## 2020-02-27 RX ORDER — ELECTROLYTE SOLUTION,INJ
1 VIAL (ML) INTRAVENOUS
Qty: 0 | Refills: 0 | DISCHARGE
Start: 2020-02-27

## 2020-02-27 RX ORDER — I.V. FAT EMULSION 20 G/100ML
10.4 EMULSION INTRAVENOUS
Qty: 25 | Refills: 0 | Status: DISCONTINUED | OUTPATIENT
Start: 2020-02-27 | End: 2020-02-27

## 2020-02-27 RX ORDER — DRONABINOL 2.5 MG
1 CAPSULE ORAL
Qty: 0 | Refills: 0 | DISCHARGE
Start: 2020-02-27

## 2020-02-27 RX ORDER — IPRATROPIUM/ALBUTEROL SULFATE 18-103MCG
3 AEROSOL WITH ADAPTER (GRAM) INHALATION
Qty: 0 | Refills: 0 | DISCHARGE
Start: 2020-02-27

## 2020-02-27 RX ORDER — ACETAMINOPHEN 500 MG
3 TABLET ORAL
Qty: 0 | Refills: 0 | DISCHARGE
Start: 2020-02-27

## 2020-02-27 RX ORDER — TAMSULOSIN HYDROCHLORIDE 0.4 MG/1
1 CAPSULE ORAL
Qty: 0 | Refills: 0 | DISCHARGE
Start: 2020-02-27

## 2020-02-27 RX ORDER — OXYCODONE HYDROCHLORIDE 5 MG/1
1 TABLET ORAL
Qty: 0 | Refills: 0 | DISCHARGE
Start: 2020-02-27

## 2020-02-27 RX ORDER — BUDESONIDE AND FORMOTEROL FUMARATE DIHYDRATE 160; 4.5 UG/1; UG/1
2 AEROSOL RESPIRATORY (INHALATION)
Qty: 0 | Refills: 0 | DISCHARGE
Start: 2020-02-27

## 2020-02-27 RX ADMIN — Medication 600 MILLIGRAM(S): at 05:16

## 2020-02-27 RX ADMIN — BUDESONIDE AND FORMOTEROL FUMARATE DIHYDRATE 2 PUFF(S): 160; 4.5 AEROSOL RESPIRATORY (INHALATION) at 05:17

## 2020-02-27 RX ADMIN — Medication 1 APPLICATION(S): at 05:16

## 2020-02-27 RX ADMIN — Medication 3 MILLILITER(S): at 05:17

## 2020-02-27 NOTE — PROGRESS NOTE ADULT - ASSESSMENT
74 y/o M presenting with septic shock and high grade SBO s/p exploratory laparotomy, lysis of adhesions, decompression of bowel via enterotomy w/ primary repair, and Abthera VAC placement on 12/6; s/p take down of Abthera, washout and re-application of the Abthera vac on 12/8. 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 s/p L chest tube placement by IR 12/30 for pleural effusion now s/p VATS, with chest tube insertion for drainage of pleural effusion. RRT called 1/13 AM for hypoxia, patient transferred back to SICU.  Patient continued SOB, chest drain possible obstruction expanding. Patent taken back to OR emergently 1/15 for clot evac and VATS. Patient is now s/p Ileostomy reversal 2/4/20. Post-op c/b bloody BM and anemia requiring blood transfusion, now improved. Now tolerating regular diet.    PLAN:  - Dispo planning: to be discharged to [facility] with TPN (patient requesting [facility]). F/U Case Management / Nutrition.  - C/w Marinol for appetite stimulation  - Continue regular diet with parenteral nutrition per Nutrition recommendations  - BID dressing changes (minimal gauze and tape)  - Monitor BM frequency and appearance  - Pain control as needed  - OOB with PT  - Appreciate Cv/pulm f/u   - Appreciate Dermatology eval/recs: Clobetasol 0.05% BID for 2 weeks. Outpatient follow up in 2 weeks on Tuesday mornings ([phone])    [facility]  [id number]

## 2020-02-27 NOTE — DISCHARGE NOTE NURSING/CASE MANAGEMENT/SOCIAL WORK - PATIENT PORTAL LINK FT
Normal vision: sees adequately in most situations; can see medication labels, newsprint
You can access the FollowMyHealth Patient Portal offered by [facility] by registering at the following website: [email or web address]. By joining [email or web address] FollowMyHealth portal, you will also be able to view your health information using other applications (apps) compatible with our system.

## 2020-02-27 NOTE — PROGRESS NOTE ADULT - REASON FOR ADMISSION
abd. pain
L pleurex
abd. pain
abd. pain,   sp  lt side pigtail for lt pl eff
sp  lt plx
l pl eff
lt pl eff
lt plt   and lt pleural effusion
lt ptx/ lt pl effusion
sp lt vats  drainage hemothorax   removal lt plx catheter
abd. pain

## 2020-02-27 NOTE — PROGRESS NOTE ADULT - PROBLEM SELECTOR PROBLEM 1
COPD (chronic obstructive pulmonary disease) with emphysema
Dermatitis
Dyspnea on exertion
Pleural effusion on left
Pneumothorax, unspecified type
Small bowel obstruction
Pleural effusion on left
Pneumothorax, unspecified type
Small bowel obstruction

## 2020-02-27 NOTE — DISCHARGE NOTE NURSING/CASE MANAGEMENT/SOCIAL WORK - NSDCFUADDAPPT_GEN_ALL_CORE_FT
Patient can follow up with dermatology at [address] [place] [address], [id number]. Patient should call [phone] to schedule appointment with Dr. Woodward/Griselda on Tuesday mornings.    Outpt PFT's. (pulmonary function tests with your pulmonologist)    Follow up with Primary Care Doctor in 2weeks

## 2020-02-27 NOTE — PROGRESS NOTE ADULT - ASSESSMENT
A/P: 75 year old male w/ PMH of COPD and EtOH dependence with PSH/o appy, prostate surgery, & spine surgery  septic shock and high grade SBO s/p exploratory laparotomy, lysis of adhesions, decompression of bowel via enterotomy w/ primary repair, and Abthera VAC placement on 12/6; s/p take down of Abthera, washout and re-application of the Abthera vac on 12/8. Now s/p SBR and end ileostomy on 12/10.   TPN initally consulted to assist w/ management of pt's nutrition in pt with SGS/malabsorption and had high Ileostomy output.  Pt s/p Lt Chest Pigtail for effusion in IR with persistent high output on 1/10/2020. Pt had VATs & placement of Left PleurX catheter. Post op pt developed hemothorax and Respiratory Distress.  Pt s/p Lt chest Evacuation of 2L blood w/ placement of CT x2 on 1/15/2020.  Lt Pleural Effusion resolved and pt doing well after CT removed.  Pt also noted with Rt PNA vs Pleural Effusion that has resolved.  Pt is s/p Ex Lap, MIRYAM, & Closure of End Ileostomy on 2/4/2020.      Pt w/ improving severe Protein-Calorie Malnutrition- needing to be discharge on TPN               improving PreAlbumin, but not sufficient enough indicating on going Malabsorption               s/p Reversal of Ileostomy in pt w/ Short Gut Syndrome   If pt doesn't make sufficient progress he may require life long TPN or be a candidate for Gattex     Pt tolerating TPN at 1/2 strength appetite improving on Marinol- tolerating LRD               improving AST/ ALT, with nL LDH, Alk Phos, CPK- continue to monitor          1/2 Strength TPN: Amino Acids 60g, Dextrose 140g, Lipids 25g in 1200mL               with 3mL MTE & 10mL MVI           Electrolytes:  NaCl 80mEq, NaAce 20mEq, NaPhos 15mMol, KCl 80mEq,             Mg 16mEq. CaGluc 14mEq  Pt is stable on TPN and not expected to require any changes while at rehab     -SIADH - improving w/ decreased volume, continue to monitor          pt not edematous, making good urine  -HypoNa- increase Na by adding NaAce of 20mEq  -HypeCa-decrease Ca from 16 to 14mEq in TPN continue to monitor  -HyperK- decrease from 80 to 60mEq KCl in TPN, continue to monitor  -LowMg- improving with increased Mg to 16mEq  -HyperPhos- improving w/ NaPhos of 15mMol & will continue to monitor   -Protonix for recent h/o GIB & Gastritis noted on EGD- stable w/ increased Mg               no further bloody BM & H&H remains stable  -Leukocytosis- resolved  -Concern for ongoing malabsrption- pt w/ Semisolid & loose BMs & slow to improve                nutritional indices        Pt will require on going 1/2strength TPN when discharged home        Repeat Fat Soluble Vitamin studies -noted improving, but pt on TPN        TPN w/ MVI to compensate for low Vit E & A        Vit D levels improved while on Ergocalciferol, which was stopped          Vit K INR/ PT near normal suggestive that it is being absorbed   -Strict Intake and Output - continue to closely monitor  -Good glycemic control-  Fingersticks & ISS coverage -   -Weights three times a week  -Daily CMP, Mg, Ionized Ca, Phosphorus,        and Weekly Triglycerides and Pre-albumin  -Outpatient follow up with Dr Darryn Levin,             [phone]; [address],             [place]; [address]  (Call to make appointment for 1st MONDAY after discharge from hospital - & say you're on TPN)  Continue as per Surgery, will follow with you, D/w primary team    Andreina Hubbrad PA-C  TPN team, pager [phone]  D/w Dr Chacko  & MU Siegel

## 2020-02-27 NOTE — PROGRESS NOTE ADULT - SUBJECTIVE AND OBJECTIVE BOX
[facility] NUTRITION SUPPORT / TPN -- FOLLOW UP NOTE  --------------------------------------------------------------------------------    24 hour events/subjective:    No n/v (+)flatus  (+)diarrhea unchanged, but manageable  Appetite a little better today- ate more breakfast  DC planning w/ TPN        tolerating cycled 12hr TPN w/ LRD and Ensure supplements       Pt having issues w/ dentures, so he's choosing calorie dense liquids & soft foods       doesn't want mechanical soft or Full liquid diet  Pt on Protonix suspension QD  no abdominal Pain  OOB ad rachel-  walks around the unit every afternoon    No HA/ dizziness, syncope, lightheadedness, loss of balance  No cough/ cp/ palp/dyspnea/ sob  No f/c/s      Diet:  Diet, Low Fiber:   Supplement Feeding Modality:  Oral  Ensure Clear Cans or Servings Per Day:  1       Frequency:  Three Times a day  Ensure Enlive Cans or Servings Per Day:  1       Frequency:  Three Times a day (02-19-20 @ 10:36)      Appetite: [  ]Poor [ x ]Adequate [  ]Good  Caloric intake:  [ x  ]  Adequate   [   ] Inadequate    ROS: General/ GI see HPI  all other systems negative    ALLERGIES & MEDICATIONS  --------------------------------------------------------------------------------  ALLERGIES  IV Contrast (Hives)      STANDING INPATIENT MEDICATIONS    albuterol/ipratropium for Nebulization. 3 milliLiter(s) Nebulizer every 6 hours  budesonide 160 MICROgram(s)/formoterol 4.5 MICROgram(s) Inhaler 2 Puff(s) Inhalation two times a day  chlorhexidine 4% Liquid 1 Application(s) Topical <User Schedule>  clobetasol 0.05% Ointment 1 Application(s) Topical two times a day  dronabinol 5 milliGRAM(s) Oral daily  enoxaparin Injectable 40 milliGRAM(s) SubCutaneous daily  fat emulsion (Fish Oil and Plant Based) 20% Infusion 10.4 mL/Hr IV Continuous <Continuous>  guaiFENesin  milliGRAM(s) Oral every 12 hours  metoprolol tartrate 12.5 milliGRAM(s) Oral every 12 hours  pantoprazole   Suspension 40 milliGRAM(s) Oral daily  Parenteral Nutrition - Adult 1 Each TPN Continuous <Continuous>  tamsulosin 0.4 milliGRAM(s) Oral at bedtime  triamcinolone 0.1% Ointment 1 Application(s) Topical two times a day      PRN INPATIENT MEDICATION  acetaminophen   Tablet .. 975 milliGRAM(s) Oral every 6 hours PRN  oxyCODONE    IR 5 milliGRAM(s) Oral every 4 hours PRN        VITALS/PHYSICAL EXAM  --------------------------------------------------------------------------------  T(C): 36.9 (02-27-20 @ 08:50), Max: 37.2 (02-26-20 @ 17:12)  HR: 98 (02-27-20 @ 08:50) (85 - 98)  BP: 125/80 (02-27-20 @ 08:50) (98/61 - 125/80)  RR: 18 (02-27-20 @ 08:50) (17 - 18)  SpO2: 98% (02-27-20 @ 08:50) (94% - 98%)  Wt(kg): --        02-26-20 @ 07:01  -  02-27-20 @ 07:00  --------------------------------------------------------  IN: 2561.5 mL / OUT: 1225 mL / NET: 1336.5 mL    02-27-20 @ 07:01  -  02-27-20 @ 09:59  --------------------------------------------------------  IN: 280 mL / OUT: 350 mL / NET: -70 mL    PHYSICAL EXAM:  --------------------------------------------------------------------------------  	Gen: guarded but stable, A&Ox3  	HEENT: NC/AT, PERRL, supple neck, trachea midline, mucosa moist              GI: (+) BS, nondistended, nontender                    abdominal dressing c/d/i              MSK: FROM x4, no contractures nor deformities              Vascular: Equally Warm,  no edema, no clubbing, cyanosis,                      RUE PICC w/o sx infection  	Neuro: No focal deficits, grossly intact sensation, & strength   	Psych: Normal affect and mood              skin: moist, good turgor, erythematous pinpoint rash chest/ back        LABS/ CULTURES/ RADIOLOGY:              8.2    8.35  >-----------<  312      [02-27-20 @ 04:41]              26.6     132  |  101  |  21  ----------------------------<  122      [02-27-20 @ 04:41]  5.0   |  23  |  0.46        Ca     8.9     [02-27-20 @ 04:41]      iCa    1.30     [02-27 @ 05:06]      Mg     1.9     [02-27-20 @ 04:41]      Phos  4.3     [02-27-20 @ 04:41]    TPro  7.0  /  Alb  3.0  /  TBili  0.3  /  DBili  x   /  AST  12  /  ALT  44  /  AlkPhos  103  [02-27-20 @ 04:41]      Prealbumin, Serum: 21 mg/dL (02-27-20 @ 09:01)  Prealbumin, Serum: 21 mg/dL (02-25-20 @ 09:24)  Prealbumin, Serum: 19 mg/dL (02-20-20 @ 09:06)  Prealbumin, Serum: 16 mg/dL (02-15-20 @ 12:30)  Prealbumin, Serum: 17 mg/dL (02-10-20 @ 09:15)     Triglycerides, Serum: 36 mg/dL (02.25.20 @ 06:04)  Triglycerides, Serum: 38 mg/dL (02.20.20 @ 05:50)  Triglycerides, Serum: 59 mg/dL (02.10.20 @ 01:03)   Triglycerides, Serum: 51 mg/dL (01.31.20 @ 04:10)

## 2020-02-27 NOTE — PROGRESS NOTE ADULT - MINUTES
90
35
38
25
25
30
30
35
40
55
35
40
40
45
40
45
120
35
40
30
120
50
90
50
90

## 2020-02-27 NOTE — PROGRESS NOTE ADULT - PROBLEM SELECTOR PLAN 4
Consider adding Midodrine 5 bid if need be for hypotension
-Mucinex 600mg BID
-Mucinex 600mg BID
Consider adding Midodrine 5 bid
Consider adding Midodrine 5 bid if need be for hypotension
Largest complaint is incisional RLQ pain with cough  -Cough is chronic, congested likely r/t COPD  -Mucinex 600mg BID   -Tessalon PRN for cough suppression as needed  -Pain control
Largest complaint is incisional RLQ pain with cough  -Cough is chronic, congested likely r/t COPD  -d/c Mucomyst as this can increase cough   -Mucinex 600mg BID   -Tessalon PRN for cough suppression as needed  -Pain control
Multiple surgical interventions now s/p ileostomy reversal  -TPN per surgical team  -Tolerating PO
Oral herpes   ?trial of Valtrex
Oral herpes   ?trial of Valtrex
Oral herpes   On Zovirax
Oral herpes   On Zovirax
REsolved
RLQ pain with cough is now much improved   -Mucinex 600mg BID   -Pain control
RLQ pain with cough is now much improved   -Mucinex 600mg BID   -Pain control
RLQ pain with cough is now much improved   -Mucinex 600mg BID   -Tessalon PRN for cough suppression as needed  -Pain control
RLQ pain with cough is now much improved   -Mucinex 600mg BID   -Tessalon PRN for cough suppression as needed  -Pain control
Resolved
improved Oral herpes   On Zovirax
now stable

## 2020-02-27 NOTE — DISCHARGE NOTE NURSING/CASE MANAGEMENT/SOCIAL WORK - NSDCPEWEB_GEN_ALL_CORE
[place] website --- [email or web address] Tobacco [email or web address] website --- [email or web address]

## 2020-02-27 NOTE — PROGRESS NOTE ADULT - SUBJECTIVE AND OBJECTIVE BOX
Subjective: Patient seen and examined. No new events except as noted.     REVIEW OF SYSTEMS:    CONSTITUTIONAL: + weakness, fevers or chills  EYES/ENT: No visual changes;  No vertigo or throat pain   NECK: No pain or stiffness  RESPIRATORY: No cough, wheezing, hemoptysis; No shortness of breath  CARDIOVASCULAR: No chest pain or palpitations  GASTROINTESTINAL: No abdominal or epigastric pain. No nausea, vomiting, or hematemesis; No diarrhea or constipation. No melena or hematochezia.  GENITOURINARY: No dysuria, frequency or hematuria  NEUROLOGICAL: No numbness or weakness  SKIN: No itching, burning, rashes, or lesions   All other review of systems is negative unless indicated above.    MEDICATIONS:  MEDICATIONS  (STANDING):  albuterol/ipratropium for Nebulization. 3 milliLiter(s) Nebulizer every 6 hours  budesonide 160 MICROgram(s)/formoterol 4.5 MICROgram(s) Inhaler 2 Puff(s) Inhalation two times a day  chlorhexidine 4% Liquid 1 Application(s) Topical <User Schedule>  clobetasol 0.05% Ointment 1 Application(s) Topical two times a day  dronabinol 5 milliGRAM(s) Oral daily  enoxaparin Injectable 40 milliGRAM(s) SubCutaneous daily  fat emulsion (Fish Oil and Plant Based) 20% Infusion 10.4 mL/Hr (10.4 mL/Hr) IV Continuous <Continuous>  guaiFENesin  milliGRAM(s) Oral every 12 hours  metoprolol tartrate 12.5 milliGRAM(s) Oral every 12 hours  pantoprazole   Suspension 40 milliGRAM(s) Oral daily  Parenteral Nutrition - Adult 1 Each TPN Continuous <Continuous>  Parenteral Nutrition - Adult 1 Each TPN Continuous <Continuous>  tamsulosin 0.4 milliGRAM(s) Oral at bedtime  triamcinolone 0.1% Ointment 1 Application(s) Topical two times a day      PHYSICAL EXAM:  T(C): 36.9 (02-27-20 @ 08:50), Max: 37.2 (02-26-20 @ 17:12)  HR: 98 (02-27-20 @ 08:50) (85 - 98)  BP: 125/80 (02-27-20 @ 08:50) (98/61 - 125/80)  RR: 18 (02-27-20 @ 08:50) (17 - 18)  SpO2: 98% (02-27-20 @ 08:50) (94% - 98%)  Wt(kg): --  I&O's Summary    26 Feb 2020 07:01  -  27 Feb 2020 07:00  --------------------------------------------------------  IN: 2561.5 mL / OUT: 1225 mL / NET: 1336.5 mL    27 Feb 2020 07:01  -  27 Feb 2020 12:10  --------------------------------------------------------  IN: 280 mL / OUT: 475 mL / NET: -195 mL          Appearance: Normal	  HEENT:   Normal oral mucosa, PERRL, EOMI	  Lymphatic: No lymphadenopathy , no edema  Cardiovascular: Normal S1 S2, No JVD, No murmurs , Peripheral pulses palpable 2+ bilaterally  Respiratory: Lungs clear to auscultation, normal effort 	  Gastrointestinal:  Soft, Non-tender, + BS	  Skin: No rashes, No ecchymoses, No cyanosis, warm to touch  Musculoskeletal: Normal range of motion, normal strength  Psychiatry:  Mood & affect appropriate  Ext: No edema      LABS:    CARDIAC MARKERS:                                8.2    8.35  )-----------( 312      ( 27 Feb 2020 04:41 )             26.6     02-27    132<L>  |  101  |  21  ----------------------------<  122<H>  5.0   |  23  |  0.46<L>    Ca    8.9      27 Feb 2020 04:41  Phos  4.3     02-27  Mg     1.9     02-27    TPro  7.0  /  Alb  3.0<L>  /  TBili  0.3  /  DBili  x   /  AST  12  /  ALT  44  /  AlkPhos  103  02-27    proBNP:   Lipid Profile:   HgA1c:   TSH:             TELEMETRY: 	    ECG:  	  RADIOLOGY:   DIAGNOSTIC TESTING:  [ ] Echocardiogram:  [ ]  Catheterization:  [ ] Stress Test:    OTHER:

## 2020-02-27 NOTE — PROGRESS NOTE ADULT - PROBLEM SELECTOR PLAN 3
ICU team Consulted IR   for L chest tap today as per thoracic  Acceptable cardiac risk to proceed
ICU team Consulted IR   s/p Pigtail catheter
ICU team Consulted IR for L chest tap as per thoracic - possibly on Monday
Multiple surgical interventions now s/p ileostomy reversal  -TPN per surgical team  -Tolerating PO
Multiple surgical interventions now s/p ileostomy reversal  -TPN per surgical team  -Tolerating PO
Multiple surgical interventions now s/p ileostomy reversal  -TPN per surgical team.
Multiple surgical interventions now s/p ileostomy reversal  -TPN per surgical team.   -Started on PO
s/p L VATS with partial decortication  -CXR grossly clear
s/p L VATS with partial decortication  -Last CXR 2/6 grossly clear, L costophrenic angle not fully visualized
stable  s/p Pigtail catheter
stable  s/p Pigtail catheter
stable  s/p VATS and Pleurx
stable  s/p VATS and Pleurx catheter
stable  s/p VATS and Pleurx catheter to suction
still with persistent pneumothorax when chest pigtail clamped  Awaiting Thoracic surgery and IR decision
still with persistent pneumothorax when chest pigtail clamped  Thoracic surgery consulted
still with persistent pneumothorax when chest pigtail clamped  Thoracic surgery consulted  IR consulted
still with persistent pneumothorax when chest pigtail clamped  Thoracic surgery consulted  IR consulted
still with persistent pneumothorax when chest pigtail clamped  Thoracic surgery consulted  IR to be consulted for drainage
stable  s/p VATS and Pleurx catheter to suction

## 2020-02-27 NOTE — PROGRESS NOTE ADULT - PROVIDER SPECIALTY LIST ADULT
CT Surgery
Cardiology
Colorectal Surgery
Dermatology
Gastroenterology
Infectious Disease
Intervent Radiology
Nutrition Support
Pulmonology
SICU
Surgery
Thoracic Surgery
Cardiology

## 2020-02-27 NOTE — PROGRESS NOTE ADULT - SUBJECTIVE AND OBJECTIVE BOX
Surgery Daily Progress Note    SUBJECTIVE:   Patient doing well, no acute complaints.   Tolerating diet with no nausea/vomiting.   having BMs and passing gas    OBJECTIVE:  Physical exam:  General: alert and oriented, NAD  Resp: airway patent, respirations unlabored  CVS: regular rate and rhythm  Abdomen: non-distended, soft, non-tender, dressings with minimal purulent stainings, dressings changed in am rounds  Extremities: no edema  Skin: warm, dry, appropriate color    V/S:  Vital Signs Last 24 Hrs  T(C): 36.9 (27 Feb 2020 08:50), Max: 37.2 (26 Feb 2020 17:12)  T(F): 98.4 (27 Feb 2020 08:50), Max: 98.9 (26 Feb 2020 17:12)  HR: 98 (27 Feb 2020 08:50) (85 - 98)  BP: 125/80 (27 Feb 2020 08:50) (98/61 - 125/80)  BP(mean): --  RR: 18 (27 Feb 2020 08:50) (17 - 18)  SpO2: 98% (27 Feb 2020 08:50) (94% - 98%)    --------------------------------------------------------------------------------------------------  I/Os:    26 Feb 2020 07:01  -  27 Feb 2020 07:00  --------------------------------------------------------  IN:    fat emulsion (Fish Oil and Plant Based) 20% Infusion: 136.5 mL    Oral Fluid: 1120 mL    TPN (Total Parenteral Nutrition): 1305 mL  Total IN: 2561.5 mL    OUT:    Voided: 1225 mL  Total OUT: 1225 mL    Total NET: 1336.5 mL      27 Feb 2020 07:01  -  27 Feb 2020 09:19  --------------------------------------------------------  IN:    Oral Fluid: 280 mL  Total IN: 280 mL    OUT:    Voided: 350 mL  Total OUT: 350 mL    Total NET: -70 mL        --------------------------------------------------------------------------------------------------  LABS:                        8.2    8.35  )-----------( 312      ( 27 Feb 2020 04:41 )             26.6     27 Feb 2020 04:41    132    |  101    |  21     ----------------------------<  122    5.0     |  23     |  0.46     Ca    8.9        27 Feb 2020 04:41  Phos  4.3       27 Feb 2020 04:41  Mg     1.9       27 Feb 2020 04:41    TPro  7.0    /  Alb  3.0    /  TBili  0.3    /  DBili  x      /  AST  12     /  ALT  44     /  AlkPhos  103    27 Feb 2020 04:41      CAPILLARY BLOOD GLUCOSE            LIVER FUNCTIONS - ( 27 Feb 2020 04:41 )  Alb: 3.0 g/dL / Pro: 7.0 g/dL / ALK PHOS: 103 U/L / ALT: 44 U/L / AST: 12 U/L / GGT: x               --------------------------------------------------------------------------------------------------  MEDICATIONS  (STANDING):  albuterol/ipratropium for Nebulization. 3 milliLiter(s) Nebulizer every 6 hours  budesonide 160 MICROgram(s)/formoterol 4.5 MICROgram(s) Inhaler 2 Puff(s) Inhalation two times a day  chlorhexidine 4% Liquid 1 Application(s) Topical <User Schedule>  clobetasol 0.05% Ointment 1 Application(s) Topical two times a day  dronabinol 5 milliGRAM(s) Oral daily  enoxaparin Injectable 40 milliGRAM(s) SubCutaneous daily  fat emulsion (Fish Oil and Plant Based) 20% Infusion 10.4 mL/Hr (10.4 mL/Hr) IV Continuous <Continuous>  guaiFENesin  milliGRAM(s) Oral every 12 hours  metoprolol tartrate 12.5 milliGRAM(s) Oral every 12 hours  pantoprazole   Suspension 40 milliGRAM(s) Oral daily  Parenteral Nutrition - Adult 1 Each TPN Continuous <Continuous>  tamsulosin 0.4 milliGRAM(s) Oral at bedtime  triamcinolone 0.1% Ointment 1 Application(s) Topical two times a day    MEDICATIONS  (PRN):  acetaminophen   Tablet .. 975 milliGRAM(s) Oral every 6 hours PRN Mild Pain (1 - 3)  oxyCODONE    IR 5 milliGRAM(s) Oral every 4 hours PRN Moderate Pain (4 - 6)

## 2020-02-27 NOTE — PROGRESS NOTE ADULT - PROBLEM SELECTOR PROBLEM 4
Cough
Hypotension, unspecified hypotension type
Rash
Small bowel obstruction
Hypotension, unspecified hypotension type

## 2020-02-27 NOTE — PROGRESS NOTE ADULT - PROBLEM SELECTOR PROBLEM 2
COPD (chronic obstructive pulmonary disease) with emphysema
Hemothorax on left
Tachycardia

## 2020-02-27 NOTE — DISCHARGE NOTE NURSING/CASE MANAGEMENT/SOCIAL WORK - NSDCPEEMAIL_GEN_ALL_CORE
[facility] for Tobacco Control email [email or web address]

## 2020-02-27 NOTE — PROGRESS NOTE ADULT - PROBLEM SELECTOR PROBLEM 3
Hemothorax on left
Pneumothorax
Small bowel obstruction
Pneumothorax

## 2021-02-05 NOTE — PROGRESS NOTE ADULT - SUBJECTIVE AND OBJECTIVE BOX
[facility] NUTRITION SUPPORT / TPN -- FOLLOW UP NOTE  --------------------------------------------------------------------------------    24 hour events/subjective:  (+)flatus  (+)2 liquid & 1 semi solid BM since yesterday am         no gross blood noted  Pt ate breakfast- no appetite-doesn't know why-         last few days feels like he's forcing himself to eat after a few bites        Breakfast not so bad, but didn't eat much lunch & dinner yesterday        calorie count initiated  Pt on Protonix suspension QD  improving abdominal Pain - relief w/ pain Rx  No n/v  No cough/ cp/ palp/dyspnea/ sob  No f/c/s      Diet:  Diet, Low Fiber:   Supplement Feeding Modality:  Oral  Ensure Clear Cans or Servings Per Day:  2       Frequency:  Three Times a day  Ensure Enlive Cans or Servings Per Day:  2       Frequency:  Three Times a day (02-14-20 @ 10:07)      Appetite: [ x ]Poor [  ]Adequate [  ]Good  Caloric intake:  [ x  ]  Adequate   [   ] Inadequate    ROS: General/ GI see HPI  all other systems negative      ALLERGIES & MEDICATIONS  --------------------------------------------------------------------------------  ALLERGIES  IV Contrast (Hives)      STANDING INPATIENT MEDICATIONS    albuterol/ipratropium for Nebulization. 3 milliLiter(s) Nebulizer every 6 hours  budesonide 160 MICROgram(s)/formoterol 4.5 MICROgram(s) Inhaler 2 Puff(s) Inhalation two times a day  chlorhexidine 2% Cloths 1 Application(s) Topical <User Schedule>  dronabinol 5 milliGRAM(s) Oral daily  enoxaparin Injectable 40 milliGRAM(s) SubCutaneous daily  ergocalciferol 87010 Unit(s) Oral every week  fat emulsion (Fish Oil and Plant Based) 20% Infusion 20.8 mL/Hr IV Continuous <Continuous>  guaiFENesin  milliGRAM(s) Oral every 12 hours  metoprolol tartrate 12.5 milliGRAM(s) Oral every 12 hours  nystatin/triamcinolone Cream 1 Application(s) Topical two times a day  pantoprazole   Suspension 40 milliGRAM(s) Oral daily  Parenteral Nutrition - Adult 1 Each TPN Continuous <Continuous>  tamsulosin 0.4 milliGRAM(s) Oral at bedtime  triamcinolone 0.1% Ointment 1 Application(s) Topical two times a day      PRN INPATIENT MEDICATION  acetaminophen   Tablet .. 975 milliGRAM(s) Oral every 6 hours PRN  benzocaine 15 mG/menthol 3.6 mG (Sugar-Free) Lozenge 1 Lozenge Oral every 4 hours PRN  oxyCODONE    IR 5 milliGRAM(s) Oral every 4 hours PRN        VITALS/PHYSICAL EXAM  --------------------------------------------------------------------------------  T(C): 36.9 (02-18-20 @ 09:05), Max: 37.4 (02-17-20 @ 17:33)  HR: 94 (02-18-20 @ 09:05) (84 - 103)  BP: 118/66 (02-18-20 @ 09:05) (109/69 - 145/71)  RR: 18 (02-18-20 @ 09:05) (18 - 19)  SpO2: 95% (02-18-20 @ 09:05) (95% - 96%)  Wt(kg): --        02-17-20 @ 07:01  -  02-18-20 @ 07:00  --------------------------------------------------------  IN: 1627.4 mL / OUT: 1550 mL / NET: 77.4 mL    02-18-20 @ 07:01  -  02-18-20 @ 10:00  --------------------------------------------------------  IN: 320 mL / OUT: 550 mL / NET: -230 mL      PHYSICAL EXAM:  --------------------------------------------------------------------------------  	Gen: guarded but stable, A&Ox3  	HEENT: NC/AT, PERRL, supple neck, trachea midline, mucosa moist              GI: (+) BS, softly distended, nontender                    midline & Rt sided ostomy dressing c/d/i              MSK: FROM x4, no contractures nor deformaties                   LLE w/ brace   	Vascular: Equally Warm,  no edema, no clubbing, cyanosis,                      RUE PICC w/o sx infection  	Neuro: No focal deficits, grossly intact sensation, & strength   	Psych: Normal affect and mood              skin: moist, erythematous pinpoint rash chest/ back, groin      LABS/ CULTURES/ RADIOLOGY:              7.7    14.13 >-----------<  449      [02-18-20 @ 05:49]              23.7     135  |  100  |  20  ----------------------------<  97      [02-18-20 @ 05:49]  4.5   |  23  |  0.43        Ca     8.4     [02-18-20 @ 05:49]      iCa    1.20     [02-17 @ 06:28]      Mg     1.9     [02-18-20 @ 05:49]      Phos  4.4     [02-18-20 @ 05:49]    TPro  6.5  /  Alb  2.7  /  TBili  0.4  /  DBili  x   /  AST  85  /  ALT  163  /  AlkPhos  105  [02-18-20 @ 05:49]        CK 38      [02-18-20 @ 05:49]        [02-18-20 @ 05:49]      Prealbumin, Serum: 16 mg/dL (02-15-20 @ 12:30)  Prealbumin, Serum: 17 mg/dL (02-10-20 @ 09:15)  Prealbumin, Serum: 21 mg/dL (02-03-20 @ 23:50)  Prealbumin, Serum: 19 mg/dL (02-03-20 @ 07:18)  Prealbumin, Serum: 18 mg/dL (02-02-20 @ 09:48)    Triglycerides, Serum: 44 mg/dL (02.15.20 @ 07:17) [facility] NUTRITION SUPPORT / TPN -- FOLLOW UP NOTE  --------------------------------------------------------------------------------    24 hour events/subjective:  (+)flatus  (+)2 liquid & 1 semi solid BM since yesterday am         no gross blood noted  Pt ate breakfast- no appetite-doesn't know why-         last few days feels like he's forcing himself to eat after a few bites        Breakfast not so bad, but didn't eat much lunch & dinner yesterday        calorie count initiated  Pt on Protonix suspension QD  improving abdominal Pain - relief w/ pain Rx  No n/v  No cough/ cp/ palp/dyspnea/ sob  No f/c/s      Diet:  Diet, Low Fiber:   Supplement Feeding Modality:  Oral  Ensure Clear Cans or Servings Per Day:  2       Frequency:  Three Times a day  Ensure Enlive Cans or Servings Per Day:  2       Frequency:  Three Times a day (02-14-20 @ 10:07)      Appetite: [ x ]Poor [  ]Adequate [  ]Good  Caloric intake:  [ x  ]  Adequate   [   ] Inadequate    ROS: General/ GI see HPI  all other systems negative      ALLERGIES & MEDICATIONS  --------------------------------------------------------------------------------  ALLERGIES  IV Contrast (Hives)      STANDING INPATIENT MEDICATIONS    albuterol/ipratropium for Nebulization. 3 milliLiter(s) Nebulizer every 6 hours  budesonide 160 MICROgram(s)/formoterol 4.5 MICROgram(s) Inhaler 2 Puff(s) Inhalation two times a day  chlorhexidine 2% Cloths 1 Application(s) Topical <User Schedule>  dronabinol 5 milliGRAM(s) Oral daily  enoxaparin Injectable 40 milliGRAM(s) SubCutaneous daily  ergocalciferol 21403 Unit(s) Oral every week  fat emulsion (Fish Oil and Plant Based) 20% Infusion 20.8 mL/Hr IV Continuous <Continuous>  guaiFENesin  milliGRAM(s) Oral every 12 hours  metoprolol tartrate 12.5 milliGRAM(s) Oral every 12 hours  nystatin/triamcinolone Cream 1 Application(s) Topical two times a day  pantoprazole   Suspension 40 milliGRAM(s) Oral daily  Parenteral Nutrition - Adult 1 Each TPN Continuous <Continuous>  tamsulosin 0.4 milliGRAM(s) Oral at bedtime  triamcinolone 0.1% Ointment 1 Application(s) Topical two times a day      PRN INPATIENT MEDICATION  acetaminophen   Tablet .. 975 milliGRAM(s) Oral every 6 hours PRN  benzocaine 15 mG/menthol 3.6 mG (Sugar-Free) Lozenge 1 Lozenge Oral every 4 hours PRN  oxyCODONE    IR 5 milliGRAM(s) Oral every 4 hours PRN        VITALS/PHYSICAL EXAM  --------------------------------------------------------------------------------  T(C): 36.9 (02-18-20 @ 09:05), Max: 37.4 (02-17-20 @ 17:33)  HR: 94 (02-18-20 @ 09:05) (84 - 103)  BP: 118/66 (02-18-20 @ 09:05) (109/69 - 145/71)  RR: 18 (02-18-20 @ 09:05) (18 - 19)  SpO2: 95% (02-18-20 @ 09:05) (95% - 96%)  Wt(kg): --        02-17-20 @ 07:01  -  02-18-20 @ 07:00  --------------------------------------------------------  IN: 1627.4 mL / OUT: 1550 mL / NET: 77.4 mL    02-18-20 @ 07:01  -  02-18-20 @ 10:00  --------------------------------------------------------  IN: 320 mL / OUT: 550 mL / NET: -230 mL      PHYSICAL EXAM:  --------------------------------------------------------------------------------  	Gen: guarded but stable, A&Ox3  	HEENT: NC/AT, PERRL, supple neck, trachea midline, mucosa moist              GI: (+) BS, softly distended, nontender                    midline & Rt sided ostomy dressing c/d/i              MSK: FROM x4, no contractures nor deformaties                   LLE w/ brace   	Vascular: Equally Warm,  no edema, no clubbing, cyanosis,                      RUE PICC w/o sx infection  	Neuro: No focal deficits, grossly intact sensation, & strength   	Psych: Normal affect and mood              skin: moist, erythematous pinpoint rash chest/ back, groin      LABS/ CULTURES/ RADIOLOGY:              7.7    14.13 >-----------<  449      [02-18-20 @ 05:49]              23.7     135  |  100  |  20  ----------------------------<  97      [02-18-20 @ 05:49]  4.5   |  23  |  0.43        Ca     8.4     [02-18-20 @ 05:49]      iCa    1.20     [02-17 @ 06:28]      Mg     1.9     [02-18-20 @ 05:49]      Phos  4.4     [02-18-20 @ 05:49]    TPro  6.5  /  Alb  2.7  /  TBili  0.4  /  DBili  x   /  AST  85  /  ALT  163  /  AlkPhos  105  [02-18-20 @ 05:49]        CK 38      [02-18-20 @ 05:49]        [02-18-20 @ 05:49]      Prealbumin, Serum: 16 mg/dL (02-15-20 @ 12:30)  Prealbumin, Serum: 17 mg/dL (02-10-20 @ 09:15)  Prealbumin, Serum: 21 mg/dL (02-03-20 @ 23:50)  Prealbumin, Serum: 19 mg/dL (02-03-20 @ 07:18)  Prealbumin, Serum: 18 mg/dL (02-02-20 @ 09:48)    Triglycerides, Serum: 44 mg/dL (02.15.20 @ 07:17)  Triglycerides, Serum: 59 mg/dL (02.10.20 @ 01:03)  Triglycerides, Serum: 53 mg/dL (02.03.20 @ 21:38)  Triglycerides, Serum: 61 mg/dL (02.02.20 @ 01:41) DC instructions

## 2021-03-10 NOTE — PROCEDURAL SAFETY CHECKLIST WITH OR WITHOUT SEDATION - NSREADY4TIMEOUT_GEN_ALL_CORE
[FreeTextEntry1] : Long discussion regarding all options and risks\par Bowel prep written and explained\par Scheduled for colonoscopy on 3/23/21 done

## 2021-03-17 ENCOUNTER — EMERGENCY (EMERGENCY)
Facility: HOSPITAL | Age: 76
LOS: 1 days | Discharge: HOME | End: 2021-03-17
Attending: EMERGENCY MEDICINE
Payer: MEDICARE

## 2021-03-17 VITALS
SYSTOLIC BLOOD PRESSURE: 187 MMHG | RESPIRATION RATE: 18 BRPM | DIASTOLIC BLOOD PRESSURE: 88 MMHG | OXYGEN SATURATION: 97 % | HEART RATE: 73 BPM

## 2021-03-17 VITALS
RESPIRATION RATE: 18 BRPM | SYSTOLIC BLOOD PRESSURE: 189 MMHG | OXYGEN SATURATION: 96 % | WEIGHT: 126.99 LBS | TEMPERATURE: 98 F | HEIGHT: 69 IN | HEART RATE: 72 BPM | DIASTOLIC BLOOD PRESSURE: 91 MMHG

## 2021-03-17 DIAGNOSIS — Z90.49 ACQUIRED ABSENCE OF OTHER SPECIFIED PARTS OF DIGESTIVE TRACT: Chronic | ICD-10-CM

## 2021-03-17 DIAGNOSIS — Z98.890 OTHER SPECIFIED POSTPROCEDURAL STATES: Chronic | ICD-10-CM

## 2021-03-17 PROCEDURE — 99283 EMERGENCY DEPT VISIT LOW MDM: CPT

## 2021-03-17 PROCEDURE — 99283 EMERGENCY DEPT VISIT LOW MDM: CPT | Mod: GC

## 2021-03-17 RX ORDER — LISINOPRIL 2.5 MG/1
10 TABLET ORAL ONCE
Refills: 0 | Status: COMPLETED | OUTPATIENT
Start: 2021-03-17 | End: 2021-03-17

## 2021-03-17 RX ADMIN — LISINOPRIL 10 MILLIGRAM(S): 2.5 TABLET ORAL at 23:57

## 2021-03-17 NOTE — ED PROVIDER NOTE - ASSESSMENT AND PLAN
Patient is a 76yoM here with asymptomatic high blood pressure at home. Patient is currently being titrated on medications for SBPs 160-170 outpatient. Elevated BP today likely exacerbated by self-confessed high healthcare anxiety from patient.     Will give lisinopril tonight and re-assess.

## 2021-03-17 NOTE — ED PROVIDER NOTE - OBJECTIVE STATEMENT
Patient is a 76yoM w/ Hx of COPD, EtOH abuse and hospitalization last year for SBO here with elevated BPs at home without symptoms. Per patient, he has had BPs at home over the last few months that have been "high" for him and his outpatient NP has been titrating his BP meds; he had an increase in metroprolol 25-->50mg BID three days ago and was ordered for lisinopril 10mg today, which he has not yet started. He had a BP with systolics in the 180s and began aggressively checking his BP. He confessed to feeling highly anxious, so he took a dose of his home xanax. He called his NP re: his systolics and she advised he come to the ED despite lack of symptoms.     In ED, BPs elevated, but patient confesses to feeling highly nervous. Denies chest pain, headache, vision change, abdominal pain, palpitations, n/v/d/c, diaphoresis.

## 2021-03-17 NOTE — ED PROVIDER NOTE - ATTENDING CONTRIBUTION TO CARE
pt here due to elevated BP reads at home - pt completely asymptomatic, specifically no CP, sob, dizziness, HA, focal neuro sx. no need for emergent tx. pt already on oral BP meds, was supposed to start a new one today for help w his bp. provided pt with first dose of this med tonight and he will  his script in am and f/u w pcp. pt given return precautions. see MDM above for my attestation statement

## 2021-03-17 NOTE — ED PROVIDER NOTE - PATIENT PORTAL LINK FT
You can access the FollowMyHealth Patient Portal offered by [facility] by registering at the following website: [email or web address]. By joining [facility] FollowMyHealth portal, you will also be able to view your health information using other applications (apps) compatible with our system.

## 2021-03-17 NOTE — ED PROVIDER NOTE - NSFOLLOWUPINSTRUCTIONS_ED_ALL_ED_FT
You were seen for elevated blood pressure. Please take your blood pressure medications as prescribed and contact your PCP in 1-2 days.    Return to ED if you develop symptoms of severe headache, vision changes, chest pain, or stroke.

## 2021-03-17 NOTE — ED PROVIDER NOTE - PHYSICAL EXAMINATION
PHYSICAL EXAM:   GENERAL: NAD, alert, normal body habitus  HEAD:  Atraumatic. Normocephalic.  EYES: EOMI. PERRLA. Normal conjunctiva/sclera.  ENT: Neck supple. Moist mucous membranes. No discharge or lesions.  CARDIAC: RRR. S1. S2. No murmur. No rub. No distant heart sounds.  LUNG/CHEST: CTAB. BS equal bilaterally. No wheezes. No rales. No rhonchi.  ABDOMEN: Soft. No tenderness. No distension.  Normoactive bowel sounds.  VASCULAR: +2 b/l radial or ulnar pulses. Palpable DP pulses.  EXTREMITIES: No edema. Moving all 4 limbs.  NEUROLOGY: A&Ox3. Non-focal exam. Cranial nerves intact. Normal speech.  PSYCH: Normal behavior. Appropriate affect. Appears anxious  SKIN: No jaundice, erythema, rashes/lesions      ICU Vital Signs Last 24 Hrs  T(C): 36.7 (17 Mar 2021 23:15), Max: 36.8 (17 Mar 2021 21:35)  T(F): 98 (17 Mar 2021 23:15), Max: 98.2 (17 Mar 2021 21:35)  HR: 80 (17 Mar 2021 23:15) (72 - 80)  BP: 196/97 (17 Mar 2021 23:15) (189/91 - 196/97)  RR: 16 (17 Mar 2021 23:15) (16 - 18)  SpO2: 96% (17 Mar 2021 23:15) (96% - 96%)

## 2021-03-17 NOTE — ED PROVIDER NOTE - CLINICAL SUMMARY MEDICAL DECISION MAKING FREE TEXT BOX
pt here due to elevated BP reads at home - pt completely asymptomatic, specifically no CP, sob, dizziness, HA, focal neuro sx. no need for emergent tx. pt already on oral BP meds, was supposed to start a new one today for help w his bp. provided pt with first dose of this med tonight and he will  his script in am and f/u w pcp. pt given return precautions.

## 2021-03-17 NOTE — ED PROVIDER NOTE - NS ED ROS FT
REVIEW OF SYSTEMS:    CONSTITUTIONAL: No weakness, fevers or chills  EYES: No vision changes or pain  ENT:  No vertigo or throat pain   NECK: No pain or stiffness  RESPIRATORY: No cough, wheezing, hemoptysis; No shortness of breath  CARDIOVASCULAR: No chest pain or palpitations  GASTROINTESTINAL: No abdominal or epigastric pain. No nausea, vomiting, or hematemesis; No diarrhea or constipation.   GENITOURINARY: No dysuria, frequency   NEUROLOGICAL: No numbness or weakness  SKIN: No itching, rashes  PSYCH: +Anxiety

## 2021-03-17 NOTE — ED PROVIDER NOTE - HIV OFFER
PALLIATIVE CARE SOCIAL WORK DISCHARGE NOTE    Informed patient is ready for discharge. Patient’s discharge destination is home with [facility]. Patient to be picked up by [facility] at 1500 and 1700 admission with [facility]. Initial implementation of the patient’s discharge plan has been arranged, including any devices/equipment needed for discharge. Discharge plan communicated to MD, RN, HUC, CM, SW, Receiving Facility/Agency and wife Faviola and daughter Mahi.    Kena Pedro MSW, APSW  Palliative Care   [phone]     Opt out

## 2021-09-22 NOTE — PROGRESS NOTE ADULT - PROBLEM SELECTOR PLAN 1
sp  IR placed lt pigtail  keep lws  daily chest xray while pl tube in place  continue monitor chest tube output  incentive spx  pulmonary toileting  oob and mobilize  will follow  Planning for pleurex cath placement on Friday 1/10 w/ Dr. Mayes 4

## 2021-11-18 NOTE — SWALLOW VFSS/MBS ASSESSMENT ADULT - RESIDUE IN PYRIFORM SINUSES
Mild/Trace Trace/Mild Not Assessed Complex Repair And Graft Additional Text (Will Appearing After The Standard Complex Repair Text): The complex repair was not sufficient to completely close the primary defect. The remaining additional defect was repaired with the graft mentioned below.

## 2022-05-22 NOTE — PATIENT PROFILE ADULT - LIVING ENVIRONMENT
Pt reports wart on right foot treated 24 hours ago and wound is now red and painful, concern for infection  
no

## 2022-07-29 NOTE — CHART NOTE - NSCHARTNOTEFT_GEN_A_CORE
Brief Note. Calorie Count ordered 1/3 - 1/5.    Chart reviewed, events noted. "75 y/o M presenting with septic shock and high grade SBO s/p exploratory laparotomy, lysis of adhesions, decompression of bowel via enterotomy w/ primary repair, and Abthera VAC placement on 12/6; s/p take down of Abthera, washout and re-application of the Abthera vac on 12/8. Now s/p SBR and end ileostomy on 12/10 acute respiratory distress now improving, Pneumothorax, hyperglycemia, delirium."    Pt with severe malnutrition, remains on full dose TPN. Per SLP evaluation (1/2), dietary restrictions of DYS1NC remain in place. Calorie Count ordered 1/3/20.    Diet : Dysphagia 1 Puree Nectar Consistency, with Nectar Consistency Mighty Shake (200 calories, 6 Gm protein) tid    Parenteral Nutrition: TPN (ordered 1/2/20) 1.8L infusing at 75ml/hr (120Gm amino acids, 210Gm dextrose, 50Gm SMOF lipids) to provide 1694cal/day (31cal/Kg and 2.2Gm protein/Kg per dosing wt 54.2Kg); with 10ml MVI, 3ml MTE-5.     Non-Protein Calories: 1214 gregorio/day (22 gregorio/Kg)  Dextrose Infusion Rate: 2.7 mg/Kg/min  Lipid Infusion Rate: 0.92 Gm/Kg/day; 0.08 Gm/Kg/hr      Ileostomy output: 3500ml; Rx noted for Loperamide, Octreotide, opium tincture    MEDS: reviewed    LABS:   01-03 @ 00:43: Sodium 138, Potassium 4.1, Chloride 104, Calcium 7.1<L>, Magnesium 1.9, Phosphorus 2.7, BUN 16, Creatinine 0.45<L>, <H>, Alk Phos 69, ALT/SGPT 11, AST/SGOT 11, Total Protein 4.9<L>, Albumin 2.0<L>, Total Bilirubin 0.4, Direct Bilirubin 0.2,   01-03 @ 00:40:  Hemoglobin 7.7<L>, Hematocrit 25.5<L>    POCT Blood Glucose.: 120 mg/dL (03 Jan 2020 11:07)  POCT Blood Glucose.: 130 mg/dL (03 Jan 2020 07:59)  POCT Blood Glucose.: 122 mg/dL (02 Jan 2020 22:04)  POCT Blood Glucose.: 131 mg/dL (02 Jan 2020 16:03)      Recommend  1) Continue DYS1NC diet with 3 servings Nectar Consistency Mighty Shake (200 calories, 6 Gm protein)   2) Continue TPN per TPN Team/Nutrition assessment  3) Monitor results of Calorie Count    RD remains available upon request and will follow up per protocol.    Sowmya Graham, MS RD CDN [facility], Pager # [phone] = A lot of assistance

## 2022-10-31 NOTE — BRIEF OPERATIVE NOTE - NSICDXBRIEFOPLAUNCH_GEN_ALL_CORE
Patient called requesting a refill of his zolpidem. He is completely out of medication and would like this refilled as soon as possible.     Informed patient that his prescription was filled yesterday. Patient will call pharmacy and request that they fill his prescription.     Isabel Russo RN on 10/31/2022 at 9:25 AM    
<--- Click to Launch ICDx for PreOp, PostOp and Procedure

## 2023-05-30 NOTE — BRIEF OPERATIVE NOTE - URINE OUTPUT
20
50
50
1200
Minocycline Counseling: Patient advised regarding possible photosensitivity and discoloration of the teeth, skin, lips, tongue and gums.  Patient instructed to avoid sunlight, if possible.  When exposed to sunlight, patients should wear protective clothing, sunglasses, and sunscreen.  The patient was instructed to call the office immediately if the following severe adverse effects occur:  hearing changes, easy bruising/bleeding, severe headache, or vision changes.  The patient verbalized understanding of the proper use and possible adverse effects of minocycline.  All of the patient's questions and concerns were addressed.

## 2023-08-10 NOTE — H&P ADULT - REASON FOR ADMISSION
abd. pain Tranexamic Acid Counseling:  Patient advised of the small risk of bleeding problems with tranexamic acid. They were also instructed to call if they developed any nausea, vomiting or diarrhea. All of the patient's questions and concerns were addressed.

## 2023-08-28 NOTE — ED ADULT TRIAGE NOTE - SOURCE OF INFORMATION
Subjective:      Patient ID: Bianca Amaral is a 65 y.o. male. HPI  Established patient here for a visit to manage acute and chronic medical conditions as detailed below. Type 2 diabetes mellitus without complication, without long-term current use of insulin (HCC)  This is new,   hba1c was 9.5,   Last one was 5.5. No symptoms   Counseled,  calorie and carb cts,  Check and monitor sugars  Exercise,   Weight loss,  Counseled patient regarding diet and lifestyle modification. Avoid sweets, cut carbs   Start metformin 1000 mg po bid,  Glucose monitoring supplies,   F/u in 3 months. Vitamin D deficiency  Call patient, - vit D levels are very low. Needs to start taking vit D supplements daily. 2000 units daily, this is over the counter. B12 deficiency  Suggest b12 supplements,  Will recheck,       Mixed hyperlipidemia  This has been a long standing problem, takes no meds. Monitors diet and tries to follow a low fat diet. Has  been reasonably  compliant w exercise. Lipids have been stable, The problem is controlled. Recent lipid tests were reviewed and are normal. Pertinent negatives include no chest pain, focal sensory loss, focal weakness, leg pain, myalgias or shortness of breath. Advised patient to continue the current instructions or medications. Review of Systems  ROS: No unusual headaches or allergy symptoms or blurred vision. No prolonged cough. No flushing or facial pain or chest pain,dizziness, dyspnea, palpitations, or chest pain on exertion. No syncope. No nausea or vommitting or diarrhea. No jaundice or abdominal pain, change in bowel habits, black or bloody stools. No dysuria or hematuria or frequency of urination. No myalgias or muscle pain. No numbness, weakness, or tingling. No falls, or loss of consciousness. No weight loss or back pain. No falls. No paresthesias. No joint swelling or redness. No joint pain. No recent weight loss.  No focal weakness or sensory
EMS

## 2023-10-27 NOTE — PROGRESS NOTE ADULT - ATTENDING COMMENTS
Patient seen and examined and agree with above.   POD# 7 s/p reversal of ileostomy with side to side ileocolic anastamosis  Continues to have multiple bowel movements some with melena  Received 1 unit PRBC overnight with appropriate response   Plan for endoscopy and colonoscopy with plan for tomorrow.   H/H stable   Hct 21-->23-->24  Hemodynamically stable   Respiratory state is stable with SpO2 96%  COPD- continue pulmicort at this time   Hemodynamically appropriate   Gi - NPO  and TPN                                                     Plan for prep for colonoscopy and scope tomorrow breast self-exam

## 2024-02-05 NOTE — PROGRESS NOTE ADULT - ASSESSMENT
The catheter was repositioned into the ostial SVG graft. An angiography was performed of the Diag graft. Multiple views were taken. The angiography was performed via power injection. 76 y/o M presenting with septic shock and high grade SBO s/p exploratory laparotomy, lysis of adhesions, decompression of bowel via enterotomy w/ primary repair, and Abthera VAC placement on 12/6; s/p take down of Abthera, washout and re-application of the Abthera vac on 12/8. 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 s/p L chest tube placement by IR 12/30 for pleural effusion now s/p VATS, with chest tube insertion for drainage of pleural effusion. RRT called 1/13 AM for hypoxia, patient transferred back to SICU.  Patient continued SOB, chest drain possible obstruction expanding. Patent taken back to OR emergently 1/15 for clot evac and VATS.     PLAN:  - Continue diet as tolerated  - Monitor Ileostomy output  - OOB to chair  - Continue Lomotil, loperamide, tincture of opium, octreotide  - Appreciate continued SICU care    [facility]  [id number]

## 2024-05-09 NOTE — PROGRESS NOTE ADULT - SUBJECTIVE AND OBJECTIVE BOX
GENERAL SURGERY PROGRESS NOTE    SUBJECTIVE/24 HOUR EVENTS:   - Michelle discontinued  - Cosyntropin test administered   - Placed on nocturnal BiPAP   - Patient seen and examined.   - Patient feels well, tolerating diet, on TPN, oob/ambi, pain controlled, denies f/c/n/v/d/sob,      PHYSICAL EXAM:  General: Appears well, NAD  Neuro: AAOx3  CHEST: Clear to auscultation bilaterally,  CV: Regular rate and rhythm  Abdomen: soft, nontender, nondistended, no rebound or guarding, ostomy pink, intact, draining semisolid  Extremities: Grossly symmetric    V/S:  Vital Signs Last 24 Hrs  T(C): 37.3 (2020 23:00), Max: 37.5 (2020 15:00)  T(F): 99.1 (2020 23:00), Max: 99.5 (2020 15:00)  HR: 85 (2020 06:00) (68 - 96)  BP: 129/59 (2020 06:00) (90/52 - 134/60)  BP(mean): 86 (2020 06:00) (66 - 89)  RR: 23 (2020 06:00) (14 - 37)  SpO2: 95% (2020 06:00) (95% - 100%)    --------------------------------------------------------------------------------------------------  I/Os:    2020 07:01  -  2020 07:00  --------------------------------------------------------  IN:    fat emulsion (Fish Oil and Plant Based) 20% Infusion: 175 mL    Lactated Ringers IV Bolus: 1000 mL    Oral Fluid: 810 mL    Packed Red Blood Cells: 350 mL    Solution: 100 mL    TPN (Total Parenteral Nutrition): 1200 mL  Total IN: 3635 mL    OUT:    Ileostomy: 1470 mL    Indwelling Catheter - Urethral: 1745 mL  Total OUT: 3215 mL    Total NET: 420 mL      2020 07:01  -  2020 06:36  --------------------------------------------------------  IN:    fat emulsion (Fish Oil and Plant Based) 20% Infusion: 137.5 mL    Oral Fluid: 850 mL    Solution: 325 mL    TPN (Total Parenteral Nutrition): 1100 mL  Total IN: 2412.5 mL    OUT:    Ileostomy: 1700 mL    Indwelling Catheter - Urethral: 65 mL    Voided: 600 mL  Total OUT: 2365 mL    Total NET: 47.5 mL        --------------------------------------------------------------------------------------------------  LABS:                        8.0    8.92  )-----------( 275      ( 2020 00:14 )             25.5     2020 00:14    133    |  93     |  21     ----------------------------<  155    4.9     |  32     |  0.37     Ca    7.9        2020 00:14  Phos  3.0       2020 00:14  Mg     1.9       2020 00:14    TPro  5.7    /  Alb  2.3    /  TBili  0.3    /  DBili  0.2    /  AST  21     /  ALT  29     /  AlkPhos  74     2020 00:14      CAPILLARY BLOOD GLUCOSE      POCT Blood Glucose.: 123 mg/dL (2020 05:17)  POCT Blood Glucose.: 134 mg/dL (2020 23:01)  POCT Blood Glucose.: 134 mg/dL (2020 18:03)  POCT Blood Glucose.: 107 mg/dL (2020 12:16)    CARDIAC MARKERS ( 2020 02:08 )  x     / x     / 31 U/L / x     / 4.2 ng/mL      LIVER FUNCTIONS - ( 2020 00:14 )  Alb: 2.3 g/dL / Pro: 5.7 g/dL / ALK PHOS: 74 U/L / ALT: 29 U/L / AST: 21 U/L / GGT: x             Culture - Blood (collected 2020 03:52)  Source: .Blood Blood  Preliminary Report (2020 04:01):    No growth to date.    Culture - Blood (collected 2020 03:52)  Source: .Blood Blood  Preliminary Report (2020 04:01):    No growth to date.      Urinalysis Basic - ( 2020 00:05 )    Color: Light Orange / Appearance: Slightly Turbid / S.030 / pH: x  Gluc: x / Ketone: Trace  / Bili: Negative / Urobili: Negative   Blood: x / Protein: 30 mg/dL / Nitrite: Negative   Leuk Esterase: Negative / RBC: >50 /hpf / WBC 2 /HPF   Sq Epi: x / Non Sq Epi: 0 /hpf / Bacteria: Negative      --------------------------------------------------------------------------------------------------  MEDICATIONS  (STANDING):  albuterol/ipratropium for Nebulization. 3 milliLiter(s) Nebulizer every 6 hours  buDESOnide    Inhalation Suspension 0.5 milliGRAM(s) Inhalation every 12 hours  chlorhexidine 2% Cloths 1 Application(s) Topical <User Schedule>  diphenoxylate/atropine 2 Tablet(s) Oral <User Schedule>  enoxaparin Injectable 40 milliGRAM(s) SubCutaneous daily  ergocalciferol 46124 Unit(s) Oral every week  fat emulsion (Fish Oil and Plant Based) 20% Infusion 12.5 mL/Hr (12.5 mL/Hr) IV Continuous <Continuous>  insulin lispro (HumaLOG) corrective regimen sliding scale   SubCutaneous every 6 hours  loperamide 16 milliGRAM(s) Oral <User Schedule>  octreotide  Injectable 100 MICROGram(s) SubCutaneous three times a day  opium Tincture 6 milliGRAM(s) Oral <User Schedule>  pantoprazole    Tablet 40 milliGRAM(s) Oral before breakfast  Parenteral Nutrition - Adult 1 Each (50 mL/Hr) TPN Continuous <Continuous>  psyllium Powder 1 Packet(s) Oral three times a day  tamsulosin 0.4 milliGRAM(s) Oral daily    MEDICATIONS  (PRN):  acetaminophen   Tablet .. 975 milliGRAM(s) Oral every 6 hours PRN Mild Pain (1 - 3)  ondansetron Injectable 4 milliGRAM(s) IV Push every 6 hours PRN Nausea and/or Vomiting Alert-The patient is alert, awake and responds to voice. The patient is oriented to time, place, and person. The triage nurse is able to obtain subjective information.

## 2024-06-19 NOTE — PROGRESS NOTE ADULT - PROBLEM/PLAN-2
H&P reviewed. The patient was examined and there are no changes to the H&P.        
  H&P reviewed. The patient was examined and there are no changes to the H&P.      Risk and benefits of procedure discussed with patient.  Risk include bleeding, infection, pneumothorax can occur.  Patient expressed understanding would like to proceed.  Patient is not currently on any blood thinners.      
DISPLAY PLAN FREE TEXT

## 2024-11-12 NOTE — PROVIDER CONTACT NOTE (OTHER) - DATE AND TIME:
01-Feb-2020 12:12
09-Dec-2019 09:02
12-Dec-2019 06:00
12-Dec-2019 22:00
13-Dec-2019 06:30
13-Jan-2020 02:30
14-Jan-2020 16:26
15-Isma-2020 00:00
25-Jan-2020 19:20
25-Jan-2020 21:04
26-Jan-2020 01:07
26-Jan-2020 05:04
30-Dec-2019 18:30
No